# Patient Record
Sex: FEMALE | Race: BLACK OR AFRICAN AMERICAN | NOT HISPANIC OR LATINO | Employment: OTHER | URBAN - METROPOLITAN AREA
[De-identification: names, ages, dates, MRNs, and addresses within clinical notes are randomized per-mention and may not be internally consistent; named-entity substitution may affect disease eponyms.]

---

## 2017-01-03 ENCOUNTER — TELEPHONE (OUTPATIENT)
Dept: ORTHOPEDICS | Facility: CLINIC | Age: 52
End: 2017-01-03

## 2017-01-03 NOTE — TELEPHONE ENCOUNTER
"----- Message from Sylvie Monterroso sent at 1/3/2017  9:17 AM CST -----  Contact: Patient herself  X  1st Request  _  2nd Request  _  3rd Request    Who:  Amna Chawla (mrn# [id number])    Why:  Patient called and said, "surgery area has opened more since last office visit."  Please give a call at your earliest convenience.  THANKS!    What Number to Call Back:  [phone]    When to Expect a call back: (Before the end of the day)   -- if call after 3:00 call back will be tomorrow.                          "

## 2017-01-04 ENCOUNTER — OFFICE VISIT (OUTPATIENT)
Dept: NEUROLOGY | Facility: CLINIC | Age: 52
End: 2017-01-04
Attending: PSYCHIATRY & NEUROLOGY
Payer: MEDICARE

## 2017-01-04 ENCOUNTER — OFFICE VISIT (OUTPATIENT)
Dept: ORTHOPEDICS | Facility: CLINIC | Age: 52
End: 2017-01-04
Payer: MEDICARE

## 2017-01-04 VITALS
HEART RATE: 88 BPM | WEIGHT: 266.81 LBS | DIASTOLIC BLOOD PRESSURE: 100 MMHG | HEIGHT: 64 IN | SYSTOLIC BLOOD PRESSURE: 136 MMHG | BODY MASS INDEX: 45.55 KG/M2

## 2017-01-04 VITALS
HEART RATE: 73 BPM | WEIGHT: 266 LBS | DIASTOLIC BLOOD PRESSURE: 83 MMHG | BODY MASS INDEX: 45.41 KG/M2 | SYSTOLIC BLOOD PRESSURE: 121 MMHG | HEIGHT: 64 IN

## 2017-01-04 DIAGNOSIS — Z98.890 STATUS POST SURGERY: Primary | ICD-10-CM

## 2017-01-04 DIAGNOSIS — E66.01 OBESITY, CLASS III, BMI 40-49.9 (MORBID OBESITY): ICD-10-CM

## 2017-01-04 DIAGNOSIS — E78.5 HYPERLIPIDEMIA, UNSPECIFIED HYPERLIPIDEMIA TYPE: ICD-10-CM

## 2017-01-04 DIAGNOSIS — E78.00 PURE HYPERCHOLESTEROLEMIA: ICD-10-CM

## 2017-01-04 DIAGNOSIS — F41.9 ANXIETY: ICD-10-CM

## 2017-01-04 DIAGNOSIS — G50.0 SUPRAORBITAL NEURALGIA: ICD-10-CM

## 2017-01-04 DIAGNOSIS — G44.86 CERVICOGENIC HEADACHE: ICD-10-CM

## 2017-01-04 DIAGNOSIS — R41.89 COGNITIVE DEFICITS: ICD-10-CM

## 2017-01-04 DIAGNOSIS — M54.81 BILATERAL OCCIPITAL NEURALGIA: ICD-10-CM

## 2017-01-04 DIAGNOSIS — G93.1 ANOXIC BRAIN INJURY: ICD-10-CM

## 2017-01-04 DIAGNOSIS — G43.711 CHRONIC MIGRAINE WITHOUT AURA, WITH INTRACTABLE MIGRAINE, SO STATED, WITH STATUS MIGRAINOSUS: Primary | ICD-10-CM

## 2017-01-04 PROCEDURE — 99999 PR PBB SHADOW E&M-EST. PATIENT-LVL IV: CPT | Mod: PBBFAC,,, | Performed by: PSYCHIATRY & NEUROLOGY

## 2017-01-04 PROCEDURE — 96118 PR NEUROPSYCH TESTING BY PSYCH/PHYS: CPT | Mod: 59,S$PBB,, | Performed by: PSYCHIATRY & NEUROLOGY

## 2017-01-04 PROCEDURE — 99213 OFFICE O/P EST LOW 20 MIN: CPT | Mod: PBBFAC,27 | Performed by: PLASTIC SURGERY

## 2017-01-04 PROCEDURE — 99215 OFFICE O/P EST HI 40 MIN: CPT | Mod: 25,S$PBB,, | Performed by: PSYCHIATRY & NEUROLOGY

## 2017-01-04 PROCEDURE — 99999 PR PBB SHADOW E&M-EST. PATIENT-LVL III: CPT | Mod: PBBFAC,,, | Performed by: PLASTIC SURGERY

## 2017-01-04 PROCEDURE — 99024 POSTOP FOLLOW-UP VISIT: CPT | Mod: ,,, | Performed by: PLASTIC SURGERY

## 2017-01-04 NOTE — LETTER
January 4, 2017      David Cortez MD  [address]  [facility] [address]           [facility]  [address]  [facility] [address]  Phone: [phone]  Fax: [phone]          Patient: Amna Chawla   MR Number: [id number]   YOB: 1965   Date of Visit: 1/4/2017       Dear Dr. David Cortez:    Thank you for referring Amna Chawla to me for evaluation. Attached you will find relevant portions of my assessment and plan of care.    If you have questions, please do not hesitate to call me. I look forward to following Amna Chawla along with you.    Sincerely,    Missy Rock MD    Enclosure  CC:  No Recipients    If you would like to receive this communication electronically, please contact [email or web address] or [phone] to request more information on [employer] access.    For providers and/or their staff who would like to refer a patient to [facility], please contact us through our one-stop-shop provider referral line, [facility], at [phone].    If you feel you have received this communication in error or would no longer like to receive these types of communications, please e-mail [email or web address]

## 2017-01-04 NOTE — PATIENT INSTRUCTIONS
Thank you for visiting us at [facility].  You were seen by Dr. Missy Rock.  You were seen for brain injury.  We will be recommending the following:    -conservative measures: cognitive rest, avoid further head injuries  -sleep/wake cycle:   -sleep hygiene (see below)   -melatonin 0.1-0.3mg at night as needed  We have recommended speech(memory), occupational (activities of daily life), and physical(neck) therapy for you.  Please contact [phone] to schedule.  You will be seen by Dr. Rivas or King for your neuropsychological testing.  Please contact his assistant Aurelia Morales at [phone] to schedule.  -headaches: per Dr. Cortez  -Check out the BIALA website: http://www.biala.org/ and ABIS support group www.abisnola.org      You may receive a survey about your experience at [facility].  We appreciate you taking the time to complete the survey to help us improve our service and care.    Sleep Hygiene:    1. Avoid watching TV, eating, and discussing emotional issues in bed. The bed should be used for sleep and sex only. If not, we can associate the bed with other activities and it often becomes difficult to fall asleep.  2. Minimize noise, light, and temperature extremes during sleep with ear plugs, window blinds, or an electric blanket or air conditioner. Even the slightest nighttime noises or luminescent lights can disrupt the quality of your sleep. Try to keep your bedroom at a comfortable temperature -- not too hot (above 75 degrees) or too cold (below 54 degrees).  3. Try not to drink fluids after 8 p.m. This may reduce awakenings due to urination.  4. Avoid naps, but if you do nap, make it no more than about 25 minutes about eight hours after you awake. But if you have problems falling asleep, then no naps for you.  5. Do not expose your self to bright light if you need to get up at night. Use a small night-light instead.  6. Nicotine is a stimulant and should be avoided  particularly near bedtime and upon night awakenings. Having a smoke before bed, although it may feel relaxing, is actually putting a stimulant into your bloodstream.  7. Caffeine is also a stimulant and is present in coffee (100-200 mg), soda (50-75 mg), tea (50-75 mg), and various over-the-counter medications. Caffeine should be discontinued at least four to six hours before bedtime. If you consume large amounts of caffeine and you cut your self off too quickly, beware; you may get headaches that could keep you awake.  8. Although alcohol is a depressant and may help you fall asleep, the subsequent metabolism that clears it from your body when you are sleeping causes a withdrawal syndrome. This withdrawal causes awakenings and is often associated with nightmares and sweats.  9. A light snack may be sleep-inducing, but a heavy meal too close to bedtime interferes with sleep. Stay away from protein and stick to carbohydrates or dairy products. Milk contains the amino acid L-tryptophan, which has been shown in research to help people go to sleep. So milk and cookies or crackers (without chocolate) may be useful and taste good as well.    -Brain Health lifestyle modifications:    -sleep hygiene   - diet: Mediterranean Diet    -exercise: 20-30 minutes of  Moderate exercise 3-5 times a week   -stress management reviewed   -smoking cessation, alcohol moderation    Check out my blog post for further information:  [email or web address]-[email or web address]

## 2017-01-04 NOTE — PROGRESS NOTES
"Ms. Chawla is here today for a post-operative visit.she is 4 weeks status post right open carpal tunnel release on 12/9 . she reports that she is doing well, she states that still has some soreness in the palm and wrist. She denies any redness or drainage.  she denies fever, chills, and sweats since the time of the surgery.     Physical exam:    Vitals:    01/04/17 1021   BP: 121/83   Pulse: 73   Weight: 120.7 kg (266 lb)   Height: 5' 4" (1.626 m)   PainSc:   8   PainLoc: Hand      Incision is clean, dry and intact. Mild peeling skin around wound,  There is no erythema or exudate.  There is no sign of any infection. she is NVI. Sutures in place, good opposition    Assessment: status post right open carpal tunnel release    Plan:  Amna was seen today for pain and post-op evaluation.    Diagnoses and all orders for this visit:    Status post surgery    - sutures removed today,   -  may get it wet in the shower and use regular soap  - avoid heavy lifting  - Continue Range of motion exercises   - Tylenol 500 mg and/or Ibuprofen 400mg every 4-6 hours with food for pain as tolerated  - - Follow up: three weeks  "

## 2017-01-04 NOTE — MR AVS SNAPSHOT
[facility]  [address]  [place] [address]  Phone: [phone]  Fax: [phone]                  Amna Chawla   2017 9:00 AM   Office Visit    Description:  Female : 1965   Provider:  Missy Rock MD   Department:  [facility]           Reason for Visit     Concussion           Diagnoses this Visit        Comments    Chronic migraine without aura, with intractable migraine, so stated, with status migrainosus    -  Primary     Supraorbital neuralgia         Bilateral occipital neuralgia         Anoxic brain injury         Hyperlipidemia, unspecified hyperlipidemia type         Cognitive deficits         Pure hypercholesterolemia         Anxiety         Obesity, Class III, BMI 40-49.9 (morbid obesity)         Cervicogenic headache                To Do List           Future Appointments        Provider Department Dept Phone    2017 10:30 AM Matt Pugh Jr., MD [facility] [phone]    2017 10:15 AM Matt Pguh Jr., MD [facility] [phone]    2017 8:00 AM Missy Rock MD [facility] [phone]    3/28/2017 9:40 AM PACEMAKER, [facility] [address] - Arrhythmia [phone]      Goals (5 Years of Data)     None      Follow-Up and Disposition     Return in about 2 months (around 3/4/2017).    Follow-up and Disposition History      [facility] On [facility]     [facility] On Call Nurse Care Line -  Assistance  Registered nurses in the [facility] On Call Center provide clinical advisement, health education, appointment booking, and other advisory services.  Call for this free service at [phone].             Medications           Message regarding Medications     Verify the changes and/or additions to your medication regime listed below are the same as discussed with your clinician today.  If any of these changes or additions are incorrect, please notify your healthcare provider.             Verify that the below list of  medications is an accurate representation of the medications you are currently taking.  If none reported, the list may be blank. If incorrect, please contact your healthcare provider. Carry this list with you in case of emergency.           Current Medications     aripiprazole (ABILIFY) 5 MG Tab TAKE 1 TABLET(5 MG) BY MOUTH EVERY DAY    atorvastatin (LIPITOR) 40 MG tablet TAKE ONE TABLET BY MOUTH EVERY MORNING    butalbital-acetaminophen-caffeine -40 mg (FIORICET, ESGIC) -40 mg per tablet     carvedilol (COREG) 25 MG tablet TAKE 1 TABLET(25 MG) BY MOUTH TWICE DAILY WITH MEALS    chlorthalidone (HYGROTEN) 25 MG Tab TAKE 1 TABLET BY MOUTH EVERY DAY    diclofenac sodium (VOLTAREN) 1 % Gel Apply twice daily using enclosed measuring device.    divalproex (DEPAKOTE) 250 MG EC tablet Take 250 mg by mouth.     donepezil (ARICEPT) 10 MG tablet Take 1 tablet (10 mg total) by mouth 2 (two) times daily.    duloxetine (CYMBALTA) 60 MG capsule Take 1 capsule (60 mg total) by mouth 2 (two) times daily.    gabapentin (NEURONTIN) 300 MG capsule Take 3 capsules (900 mg total) by mouth 3 (three) times daily.    hydrocodone-acetaminophen 5-325mg (NORCO) 5-325 mg per tablet Take 1 tablet by mouth every 6 (six) hours as needed for Pain.    hydrocodone-acetaminophen 5-325mg (NORCO) 5-325 mg per tablet Take 1 tablet by mouth every 6 (six) hours as needed for Pain.    lamotrigine (LAMICTAL) 25 MG tablet Take 6 tablets (150 mg total) by mouth once daily.    magnesium 200 mg Tab Take 200 mg by mouth once daily.    mefenamic acid 250 mg Cap TAKE 2 CAPSULES BY MOUTH TWICE DAILY AS NEEDED.    oxycodone-acetaminophen (PERCOCET) 5-325 mg per tablet Take 1 tablet by mouth every 8 (eight) hours as needed for Pain.    pantoprazole (PROTONIX) 40 MG tablet Take 1 tablet (40 mg total) by mouth once daily.    temazepam (RESTORIL) 15 mg Cap Take 1 capsule (15 mg total) by mouth nightly as needed.    tiagabine (GABITRIL) 4 MG tablet Take 1  "tablet (4 mg total) by mouth 2 (two) times daily as needed (headache).    tizanidine (ZANAFLEX) 4 MG tablet Take 4 mg by mouth every 6 (six) hours as needed.    VITAMIN D2 50,000 unit capsule TAKE 1 CAPSULE BY MOUTH EVERY 7 DAYS    desipramine (NORPRAMIN) 25 MG tablet Take 1 tablet (25 mg total) by mouth once daily.    lorazepam (ATIVAN) 0.5 MG tablet Take 1 tablet (0.5 mg total) by mouth every 6 (six) hours as needed for Anxiety.    topiramate (TOPAMAX) 100 MG tablet Take 1.5 tablets (150 mg total) by mouth 2 (two) times daily.           Clinical Reference Information           Vital Signs - Last Recorded  Most recent update: 1/4/2017  9:06 AM by Laure Ruth    BP Pulse Ht Wt BMI    (!) 136/100 88 5' 4" (1.626 m) 121 kg (266 lb 12.8 oz) 45.8 kg/m2      Blood Pressure          Most Recent Value    BP  (!)  136/100      Allergies as of 1/4/2017     Aspirin    Imitrex [Sumatriptan]    Nsaids (Non-steroidal Anti-inflammatory Drug)    Penicillins    Shellfish Containing Products    Percocet [Oxycodone-acetaminophen]    Reglan [Metoclopramide Hcl]      Immunizations Administered on Date of Encounter - 1/4/2017     None      Orders Placed During Today's Visit      Normal Orders This Visit    Ambulatory consult to Occupational Therapy     Ambulatory consult to Physical Therapy     Ambulatory consult to Speech Therapy     Ambulatory Referral to Neuropsychology       Instructions    Thank you for visiting us at [facility].  You were seen by Dr. Missy Rock.  You were seen for brain injury.  We will be recommending the following:    -conservative measures: cognitive rest, avoid further head injuries  -sleep/wake cycle:   -sleep hygiene (see below)   -melatonin 0.1-0.3mg at night as needed  We have recommended speech(memory), occupational (activities of daily life), and physical(neck) therapy for you.  Please contact [phone] to schedule.  You will be seen by Dr. Rivas or King for your " neuropsychological testing.  Please contact his assistant Aurelia Morales at [phone] to schedule.  -headaches: per Dr. Cortez  -Check out the [employer]Social[employer] website: http://www.biala.org/ and ABIS support group www.abisnola.org      You may receive a survey about your experience at [facility].  We appreciate you taking the time to complete the survey to help us improve our service and care.    Sleep Hygiene:    1. Avoid watching TV, eating, and discussing emotional issues in bed. The bed should be used for sleep and sex only. If not, we can associate the bed with other activities and it often becomes difficult to fall asleep.  2. Minimize noise, light, and temperature extremes during sleep with ear plugs, window blinds, or an electric blanket or air conditioner. Even the slightest nighttime noises or luminescent lights can disrupt the quality of your sleep. Try to keep your bedroom at a comfortable temperature -- not too hot (above 75 degrees) or too cold (below 54 degrees).  3. Try not to drink fluids after 8 p.m. This may reduce awakenings due to urination.  4. Avoid naps, but if you do nap, make it no more than about 25 minutes about eight hours after you awake. But if you have problems falling asleep, then no naps for you.  5. Do not expose your self to bright light if you need to get up at night. Use a small night-light instead.  6. Nicotine is a stimulant and should be avoided particularly near bedtime and upon night awakenings. Having a smoke before bed, although it may feel relaxing, is actually putting a stimulant into your bloodstream.  7. Caffeine is also a stimulant and is present in coffee (100-200 mg), soda (50-75 mg), tea (50-75 mg), and various over-the-counter medications. Caffeine should be discontinued at least four to six hours before bedtime. If you consume large amounts of caffeine and you cut your self off too quickly, beware; you may get headaches that could keep you awake.  8.  Although alcohol is a depressant and may help you fall asleep, the subsequent metabolism that clears it from your body when you are sleeping causes a withdrawal syndrome. This withdrawal causes awakenings and is often associated with nightmares and sweats.  9. A light snack may be sleep-inducing, but a heavy meal too close to bedtime interferes with sleep. Stay away from protein and stick to carbohydrates or dairy products. Milk contains the amino acid L-tryptophan, which has been shown in research to help people go to sleep. So milk and cookies or crackers (without chocolate) may be useful and taste good as well.    -Brain Health lifestyle modifications:    -sleep hygiene   - diet: Mediterranean Diet    -exercise: 20-30 minutes of  Moderate exercise 3-5 times a week   -stress management reviewed   -smoking cessation, alcohol moderation    Check out my blog post for further information:  [email or web address]-[email or web address]-questions-[email or web address]-health

## 2017-01-10 ENCOUNTER — TELEPHONE (OUTPATIENT)
Dept: NEUROLOGY | Facility: CLINIC | Age: 52
End: 2017-01-10

## 2017-01-10 DIAGNOSIS — G43.711 CHRONIC MIGRAINE WITHOUT AURA, INTRACTABLE, WITH STATUS MIGRAINOSUS: Primary | ICD-10-CM

## 2017-01-10 NOTE — TELEPHONE ENCOUNTER
Spoke to Pt she verbalized her appt date and time appt letter mailed         - Message from Laina Nair RN sent at 1/9/2017  2:32 PM CST -----  Contact: Self [phone]      ----- Message -----     From: Joshua Morrison     Sent: 1/9/2017   2:23 PM       To: Dana Hernandez Staff    Patient is calling to schedule an appt with , pls call

## 2017-01-16 ENCOUNTER — CLINICAL SUPPORT (OUTPATIENT)
Dept: REHABILITATION | Facility: HOSPITAL | Age: 52
End: 2017-01-16
Attending: PSYCHIATRY & NEUROLOGY
Payer: MEDICARE

## 2017-01-16 DIAGNOSIS — Z78.9 IMPAIRED MOBILITY AND ADLS: ICD-10-CM

## 2017-01-16 DIAGNOSIS — Z74.09 IMPAIRED MOBILITY AND ADLS: ICD-10-CM

## 2017-01-16 DIAGNOSIS — F06.8 COGNITIVE DEFICIT AS LATE EFFECT OF TRAUMATIC BRAIN INJURY: ICD-10-CM

## 2017-01-16 DIAGNOSIS — S06.9X0S COGNITIVE DEFICIT AS LATE EFFECT OF TRAUMATIC BRAIN INJURY: ICD-10-CM

## 2017-01-16 PROBLEM — R41.89 COGNITIVE DEFICIT AS LATE EFFECT OF TRAUMATIC BRAIN INJURY: Status: ACTIVE | Noted: 2017-01-16

## 2017-01-16 PROBLEM — S06.9XAS COGNITIVE DEFICIT AS LATE EFFECT OF TRAUMATIC BRAIN INJURY: Status: ACTIVE | Noted: 2017-01-16

## 2017-01-16 PROCEDURE — G8989 SELF CARE D/C STATUS: HCPCS | Mod: CI,PO

## 2017-01-16 PROCEDURE — G8987 SELF CARE CURRENT STATUS: HCPCS | Mod: CI,PO

## 2017-01-16 PROCEDURE — 97165 OT EVAL LOW COMPLEX 30 MIN: CPT | Mod: PO

## 2017-01-16 PROCEDURE — G8988 SELF CARE GOAL STATUS: HCPCS | Mod: CI,PO

## 2017-01-16 NOTE — PROGRESS NOTES
Name: Amna Chawla  MRN: [id number]   Physician: Missy Rock MD     Diagnosis: Impaired ADL and IADL due to memory deficits from hypoxic episode 4 years ago.   Medical history: Expanded review of medical records and personal interview.   Onset date: 2/7/2013 hypoxic episode, Carpal tunnel sx 12/9/16, Fx  R ankle last year as well from fall in home    Date of service: 1/16/17  Start time: 945  End time: 1030    Orders: Eval and Treat, S/p HIE with issues in iADLs and house hold working    Subjective:  Patient goals: Improve memory  Chief Complaint:   Chief Complaint   Patient presents with    OT Initial Evaluation      Past Medical History   Diagnosis Date    Asthma     Brain anoxic injury     Coronary artery disease     Defibrillator activation 2013    Depression     History of sudden cardiac arrest 2/2013     PEA arrest with subsequent long-QT    Hypertension     Pacemaker 2013    Seizures     Stroke      weakness lt side      Current Outpatient Prescriptions   Medication Sig    aripiprazole (ABILIFY) 5 MG Tab TAKE 1 TABLET(5 MG) BY MOUTH EVERY DAY    atorvastatin (LIPITOR) 40 MG tablet TAKE ONE TABLET BY MOUTH EVERY MORNING    butalbital-acetaminophen-caffeine -40 mg (FIORICET, ESGIC) -40 mg per tablet     carvedilol (COREG) 25 MG tablet TAKE 1 TABLET(25 MG) BY MOUTH TWICE DAILY WITH MEALS    chlorthalidone (HYGROTEN) 25 MG Tab TAKE 1 TABLET BY MOUTH EVERY DAY    desipramine (NORPRAMIN) 25 MG tablet Take 1 tablet (25 mg total) by mouth once daily.    diclofenac sodium (VOLTAREN) 1 % Gel Apply twice daily using enclosed measuring device.    divalproex (DEPAKOTE) 250 MG EC tablet Take 250 mg by mouth.     donepezil (ARICEPT) 10 MG tablet Take 1 tablet (10 mg total) by mouth 2 (two) times daily.    duloxetine (CYMBALTA) 60 MG capsule Take 1 capsule (60 mg total) by mouth 2 (two) times daily.    gabapentin (NEURONTIN) 300 MG capsule Take 3 capsules (900 mg total) by mouth 3 (three)  times daily.    hydrocodone-acetaminophen 5-325mg (NORCO) 5-325 mg per tablet Take 1 tablet by mouth every 6 (six) hours as needed for Pain.    hydrocodone-acetaminophen 5-325mg (NORCO) 5-325 mg per tablet Take 1 tablet by mouth every 6 (six) hours as needed for Pain.    lamotrigine (LAMICTAL) 25 MG tablet Take 6 tablets (150 mg total) by mouth once daily.    lorazepam (ATIVAN) 0.5 MG tablet Take 1 tablet (0.5 mg total) by mouth every 6 (six) hours as needed for Anxiety.    magnesium 200 mg Tab Take 200 mg by mouth once daily.    mefenamic acid 250 mg Cap TAKE 2 CAPSULES BY MOUTH TWICE DAILY AS NEEDED.    oxycodone-acetaminophen (PERCOCET) 5-325 mg per tablet Take 1 tablet by mouth every 8 (eight) hours as needed for Pain.    pantoprazole (PROTONIX) 40 MG tablet Take 1 tablet (40 mg total) by mouth once daily.    temazepam (RESTORIL) 15 mg Cap Take 1 capsule (15 mg total) by mouth nightly as needed.    tiagabine (GABITRIL) 4 MG tablet Take 1 tablet (4 mg total) by mouth 2 (two) times daily as needed (headache).    tizanidine (ZANAFLEX) 4 MG tablet Take 4 mg by mouth every 6 (six) hours as needed.    topiramate (TOPAMAX) 100 MG tablet Take 1.5 tablets (150 mg total) by mouth 2 (two) times daily.    VITAMIN D2 50,000 unit capsule TAKE 1 CAPSULE BY MOUTH EVERY 7 DAYS     Current Facility-Administered Medications   Medication    onabotulinumtoxina injection 200 Units    onabotulinumtoxina injection 200 Units    onabotulinumtoxina injection 200 Units    onabotulinumtoxina injection 200 Units    onabotulinumtoxina injection 200 Units     Precautions: decreased memory for cooking with danger of burning things, falls,  Social Hx: disabled  for a middle/high school , lives with daughters and fiance, 9 year old and 29 year old     DME: tub bench, cane, rollator for community,     Home access: no steps    Review of patient's allergies indicates:   Allergen Reactions    Aspirin Hives     Imitrex [sumatriptan] Palpitations    Nsaids (non-steroidal anti-inflammatory drug) Shortness Of Breath    Penicillins Hives and Swelling    Shellfish containing products Anaphylaxis     seafood    Percocet [oxycodone-acetaminophen] Itching     States can take    Reglan [metoclopramide hcl] Other (See Comments)     Parkinsonism        Special Tests:  MRI: see EPIC      Past treatment includes: OT  acute care, [facility], OP at [facility],     Precautions:  Pain: 5/10 at rest. 8/10 with movement. Pain established using the Numbers pain scale.   Pain location:L side of neck  Pain description: spasming,  Relieved by: massage and pain medication    Dominant hand: right    Occupation/hobbies/homemaking: likes to make wreaths and decorate house, cooks with supervision, helps daughter with school work some. Pt. Is never alone.    Driving status: no longer drives    Objective  Cognitive Exam  Oriented: Person, Place, Time and Situation  Behaviors: informative  Follows Commands/attention: Follows one-step commands  Communication: clear/fluent  Memory: Impaired STM  Safety awareness/insight to disability: good  Coping skills/emotional control: depressed    Visual/perceptual: Pt. Complains of blurred vision but states she has been seen by ophthalmologist and there is nothing that can be done.   Motor Planning Praxis: intact    Physical Exam:    Postural examination/scapula alignment: Rounded shoulders  Joint integrity: WNL  Skin integrity: WNL  Edema: none    Palpation: Pt. Has palpable tenderness in cervical area.     Sensation: Amna reports numbness in L hand due to carpal tunnel. R hand has had carpal tunnel sx.   ROM and strength are functional in BUE.      Strength: 30,42, 40= 37lbs RUE,   20,30,35= 28lbs LUE    Fine motor coordination: 9 hole peg test 19 secs each UE.     Gross motor coordination: WNL  Pt. Reports she gets tremors when over stimulated.   Tone:  Modified Yodit Scale:   0 - No increase in  muscle tone    Balance:   Static Sit WFL  Dynamic sit WFL    Functional Status:                              Functional Mobility:  Bed mobility: Mod I  Roll to left: Mod I  Roll to right: Mod I  Supine to sit: Mod I  Sit to supine: Mod I  Transfers to bed: Mod I  Transfers to toilet: Mod I  Car transfers: Mod I  Wheelchair mobility: n/a    ADL's:  Feeding: Mod I  Grooming: Mod I  Hygiene: Mod I  UB Dressing: Mod I  LB Dressing: Mod I  Toileting: Mod I  Bathing: Mod I    IADL's:  Homecare: Min A  Cooking: Min A  Laundry: Min A  Yard work: n/a  Use of telephone: Mod I  Money management: Mod A  Medication management: Min A  Comments: Pt. Needs to make sure someone is aware of what she is doing because she forgets things cooking or leaves water running. Pt. Folds laundry/sorts clothes and starts washer. She washes dishes, sweeps and dusts. She cleans the bathroom per her report. Family manages her money. She takes her meds with reminders occasionally. Pt. Makes coffee. Pt. Seems to do well with routine but makes errors if routine is disrupted.     TODAY'S TREATMENT  Pt. Does not have any new problems. She has had extensive OT in the past. She is functional in her home with supervision.     ASSESSMENT:  OT diagnosis: impaired IADL due to hypoxic episode 3 years ago.     Assessment  Amna Chawla is a 51 y.o. female with a medical diagnosis of hypoxia referred to occupational therapy for tx of IADL deficits. She presents with history of treatment for this in the past.    Pt. Will see speech therapy to review memory strategies with patient and family. No OT required at this time.     PLAN:  Patient/caregiver understands and agrees with plan of care.    An expanded chart review with minimal comorbidities and limited deficits with treatment options minimal due to already receiving in the past for this condition.

## 2017-01-16 NOTE — PLAN OF CARE
Name: Amna Chawla  MRN: [id number]   Physician: Missy Rock MD     Diagnosis: Impaired ADL and IADL due to memory deficits from hypoxic episode 4 years ago.   Medical history: Expanded review of medical records and personal interview.   Onset date: 2/7/2013 hypoxic episode, Carpal tunnel sx 12/9/16, Fx  R ankle last year as well from fall in home    Date of service: 1/16/17  Start time: 945  End time: 1030    Orders: Eval and Treat, S/p HIE with issues in iADLs and house hold working    Subjective:  Patient goals: Improve memory  Chief Complaint:   Chief Complaint   Patient presents with    OT Initial Evaluation      Past Medical History   Diagnosis Date    Asthma     Brain anoxic injury     Coronary artery disease     Defibrillator activation 2013    Depression     History of sudden cardiac arrest 2/2013     PEA arrest with subsequent long-QT    Hypertension     Pacemaker 2013    Seizures     Stroke      weakness lt side      Current Outpatient Prescriptions   Medication Sig    aripiprazole (ABILIFY) 5 MG Tab TAKE 1 TABLET(5 MG) BY MOUTH EVERY DAY    atorvastatin (LIPITOR) 40 MG tablet TAKE ONE TABLET BY MOUTH EVERY MORNING    butalbital-acetaminophen-caffeine -40 mg (FIORICET, ESGIC) -40 mg per tablet     carvedilol (COREG) 25 MG tablet TAKE 1 TABLET(25 MG) BY MOUTH TWICE DAILY WITH MEALS    chlorthalidone (HYGROTEN) 25 MG Tab TAKE 1 TABLET BY MOUTH EVERY DAY    desipramine (NORPRAMIN) 25 MG tablet Take 1 tablet (25 mg total) by mouth once daily.    diclofenac sodium (VOLTAREN) 1 % Gel Apply twice daily using enclosed measuring device.    divalproex (DEPAKOTE) 250 MG EC tablet Take 250 mg by mouth.     donepezil (ARICEPT) 10 MG tablet Take 1 tablet (10 mg total) by mouth 2 (two) times daily.    duloxetine (CYMBALTA) 60 MG capsule Take 1 capsule (60 mg total) by mouth 2 (two) times daily.    gabapentin (NEURONTIN) 300 MG capsule Take 3 capsules (900 mg total) by mouth 3 (three)  times daily.    hydrocodone-acetaminophen 5-325mg (NORCO) 5-325 mg per tablet Take 1 tablet by mouth every 6 (six) hours as needed for Pain.    hydrocodone-acetaminophen 5-325mg (NORCO) 5-325 mg per tablet Take 1 tablet by mouth every 6 (six) hours as needed for Pain.    lamotrigine (LAMICTAL) 25 MG tablet Take 6 tablets (150 mg total) by mouth once daily.    lorazepam (ATIVAN) 0.5 MG tablet Take 1 tablet (0.5 mg total) by mouth every 6 (six) hours as needed for Anxiety.    magnesium 200 mg Tab Take 200 mg by mouth once daily.    mefenamic acid 250 mg Cap TAKE 2 CAPSULES BY MOUTH TWICE DAILY AS NEEDED.    oxycodone-acetaminophen (PERCOCET) 5-325 mg per tablet Take 1 tablet by mouth every 8 (eight) hours as needed for Pain.    pantoprazole (PROTONIX) 40 MG tablet Take 1 tablet (40 mg total) by mouth once daily.    temazepam (RESTORIL) 15 mg Cap Take 1 capsule (15 mg total) by mouth nightly as needed.    tiagabine (GABITRIL) 4 MG tablet Take 1 tablet (4 mg total) by mouth 2 (two) times daily as needed (headache).    tizanidine (ZANAFLEX) 4 MG tablet Take 4 mg by mouth every 6 (six) hours as needed.    topiramate (TOPAMAX) 100 MG tablet Take 1.5 tablets (150 mg total) by mouth 2 (two) times daily.    VITAMIN D2 50,000 unit capsule TAKE 1 CAPSULE BY MOUTH EVERY 7 DAYS     Current Facility-Administered Medications   Medication    onabotulinumtoxina injection 200 Units    onabotulinumtoxina injection 200 Units    onabotulinumtoxina injection 200 Units    onabotulinumtoxina injection 200 Units    onabotulinumtoxina injection 200 Units     Precautions: decreased memory for cooking with danger of burning things, falls,  Social Hx: disabled  for a middle/high school , lives with daughters and fiance, 9 year old and 29 year old     DME: tub bench, cane, rollator for community,     Home access: no steps    Review of patient's allergies indicates:   Allergen Reactions    Aspirin Hives     Imitrex [sumatriptan] Palpitations    Nsaids (non-steroidal anti-inflammatory drug) Shortness Of Breath    Penicillins Hives and Swelling    Shellfish containing products Anaphylaxis     seafood    Percocet [oxycodone-acetaminophen] Itching     States can take    Reglan [metoclopramide hcl] Other (See Comments)     Parkinsonism        Special Tests:  MRI: see EPIC      Past treatment includes: OT  acute care, [facility], OP at [facility],     Precautions:  Pain: 5/10 at rest. 8/10 with movement. Pain established using the Numbers pain scale.   Pain location:L side of neck  Pain description: spasming,  Relieved by: massage and pain medication    Dominant hand: right    Occupation/hobbies/homemaking: likes to make wreaths and decorate house, cooks with supervision, helps daughter with school work some. Pt. Is never alone.    Driving status: no longer drives    Objective  Cognitive Exam  Oriented: Person, Place, Time and Situation  Behaviors: informative  Follows Commands/attention: Follows one-step commands  Communication: clear/fluent  Memory: Impaired STM  Safety awareness/insight to disability: good  Coping skills/emotional control: depressed    Visual/perceptual: Pt. Complains of blurred vision but states she has been seen by ophthalmologist and there is nothing that can be done.   Motor Planning Praxis: intact    Physical Exam:    Postural examination/scapula alignment: Rounded shoulders  Joint integrity: WNL  Skin integrity: WNL  Edema: none    Palpation: Pt. Has palpable tenderness in cervical area.     Sensation: Amna reports numbness in L hand due to carpal tunnel. R hand has had carpal tunnel sx.   ROM and strength are functional in BUE.      Strength: 30,42, 40= 37lbs RUE,   20,30,35= 28lbs LUE    Fine motor coordination: 9 hole peg test 19 secs each UE.     Gross motor coordination: WNL  Pt. Reports she gets tremors when over stimulated.   Tone:  Modified Yodit Scale:   0 - No increase in  muscle tone    Balance:   Static Sit WFL  Dynamic sit WFL    Functional Status:                              Functional Mobility:  Bed mobility: Mod I  Roll to left: Mod I  Roll to right: Mod I  Supine to sit: Mod I  Sit to supine: Mod I  Transfers to bed: Mod I  Transfers to toilet: Mod I  Car transfers: Mod I  Wheelchair mobility: n/a    ADL's:  Feeding: Mod I  Grooming: Mod I  Hygiene: Mod I  UB Dressing: Mod I  LB Dressing: Mod I  Toileting: Mod I  Bathing: Mod I    IADL's:  Homecare: Min A  Cooking: Min A  Laundry: Min A  Yard work: n/a  Use of telephone: Mod I  Money management: Mod A  Medication management: Min A  Comments: Pt. Needs to make sure someone is aware of what she is doing because she forgets things cooking or leaves water running. Pt. Folds laundry/sorts clothes and starts washer. She washes dishes, sweeps and dusts. She cleans the bathroom per her report. Family manages her money. She takes her meds with reminders occasionally. Pt. Makes coffee. Pt. Seems to do well with routine but makes errors if routine is disrupted.     TODAY'S TREATMENT  Pt. Does not have any new problems. She has had extensive OT in the past. She is functional in her home with supervision.     ASSESSMENT:  OT diagnosis: impaired IADL due to hypoxic episode 3 years ago.     Assessment  Amna Chawla is a 51 y.o. female with a medical diagnosis of hypoxia referred to occupational therapy for tx of IADL deficits. She presents with history of treatment for this in the past.    Pt. Will see speech therapy to review memory strategies with patient and family. No OT required at this time.     PLAN:  Patient/caregiver understands and agrees with plan of care.    An expanded chart review with minimal comorbidities and limited deficits with treatment options minimal due to already receiving in the past for this condition.

## 2017-01-16 NOTE — PROGRESS NOTES
"Date: 01/16/2017    Start Time:  1030  Stop Time:  1115      OUTPATIENT NEUROLOGICAL REHABILITATION  SPEECH THERAPY EVALUATION    Subjective/History  Onset Date:  February 7, 2013  Primary Diagnosis:  Anoxic Brain Injury  Treatment Diagnosis:  Cognitive-Linguistic Impairment  Referring Provider:  Dr. Rock  Orders:  for evaluation and treat  Current Medical History:  Mrs. Chawla presents to the [facility] secondary to the diagnosis of anoxic brain injury. She suffered a cardiac arrest, CVA, and fell and hit her head and suffered a concussion in 2013. She had some complications while hospitalized during that time. She now has a pacemaker and defibrillator and suffers with depression and headaches.  No family was present during this evaluation to provide history. She took the RTA LIFT today.  Past Medical History:   Past Medical History   Diagnosis Date    Asthma     Brain anoxic injury     Coronary artery disease     Defibrillator activation 2013    Depression     History of sudden cardiac arrest 2/2013     PEA arrest with subsequent long-QT    Hypertension     Pacemaker 2013    Seizures     Stroke      weakness lt side   SUMMARY AND RECOMMENDATIONS from neuropsych testing:   "Ms. Chawla was referred for Neuropsychological Evaluation on an outpatient basis due to subjective residual memory impairment following acute cardiac arrest complicated by a stroke and closed head injury (hypoxic brain injury), all of which occurred on 2/7/2013. Her general cognitive abilities as assessed by the RBANS were in the severely impaired range, with moderately impaired language (due to reduced verbal fluency); and severely impaired immediate verbal memory, visuospatial/constructional abilities, attention, and delayed memory. Further assessment of specific cognitive abilities revealed deficits in temporal orientation, naming, verbal fluency, and facial recognition. " "Constructional ability was unimpaired. Additional memory assessment revealed severely impaired immediate and delayed memory. Personality test data suggested the presence of severe depression and moderate anxiety. Neuropsychological testing reveals moderate to severe impairment in memory, attention, temporal orientation, visuospatial/constructional abilities, facial recognition, and verbal fluency due to hypoxic brain injury and stroke. Naming was variable with performances in the average and moderately impaired range. She will follow up with Juan Carlos Childers DNP in Psychiatry for depression and anxiety."    Precautions:  Memory issues, pacemaker, defibrillator   Prior Therapy:  Speech therapy at [facility] in 2013 for a few sessions.  Pain:  8/10 left shoulder pain  Nutrition:  Regular consistency and thin liquids  Environmental Concerns/Cultural/Spiritual/Developmental/Educational Needs: None   Prior Level of Function: Independent  Social History:  Mrs. Chawla lives at home with her  and daughter. 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 She does not drive any longer.   Signs of Abuse: No  Functional Deficits Leading to Referral/Nature of Injury:  Cognitive deficits  Patient Therapy Goals:  "Try to get this memory situation together."    Objective     Scales of Cognitive and Communicative Ability for Neurorehabilitation (SCCAN) was administered to assess cognitive and communicative functioning.        Raw Scores  Percentage Score  Oral Expression (OE)  18/19   100  Orientation (OR)   12/12   100  Memory (ME)    14/19   74  Speech Comprehension (SP)  13/13   100  Reading Comprehension (RD)  11/12   92  Writing (WR)    6/7   86  Attention (AT)    13/16   81  Problem Solving (PS)   17/23   74    Total Raw Score  %ile Rank  SCCAN Index  Degree of Severity  82   8   70   Mild      Auditory Comprehension: Within functional limits.    Reading " Comprehension: Mildly impaired. Mrs. Chawla stated that she has difficulty concentrating and loses interest in what she is reading.     Verbal Expression: Within functional limits.     Written Expression: Mildly impaired mostly with spelling errors. Writing may be at baseline and will be further assessed.     Cognition: Short term memory (immediate and delayed) were impaired. Attention and concentration were impaired for more complex tasks. Her clock drawing was inaccurate for drawn hands and time indicated. Problem solving, sequencing, organization, and reasoning were impaired for moderate to complex tasks. She listed 6 to 13 items in simple categories which was below average. Further assessment of complex cognition will be completed. Her family takes care of the finances for the home.      Motor Speech/Fluency/Voice: Informal oral motor exam revealed no gross weakness. No dysarthria was present. Voice and fluency were within functional limits.     Swallowing: No concerns were reported.    Hearing: Appeared to be within functional limits.     Assessment/Impressions  Mrs. 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 Her deficits were characterized by decreased short term memory, decreased attention/concentration, decreased moderate to complex problem-solving, reasoning, sequencing and organization. Auditory comprehension, verbal expression, voice, and fluency were within functional limits. She will benefit from outpatient neurological rehabilitation speech therapy. A repeat neuropsychological evaluation may be beneficial since the last one was in 2013.     Functional Communication Measure (FCM):   Severity Modifier for Medicare G-Code:  *Memory  Current status: FCM:  Level 5   - CJ  at least 20% < 40% impaired, limited or restricted  Projected status:  FCM:  Level 6   - CI  at least 1% but less than 20% impaired, limited or  restricted    Problem Solving  Current status: FCM:  Level 5   - CJ  at least 20% < 40% impaired, limited or restricted  Projected status:  FCM:  Level 6   - CI  at least 1% but less than 20% impaired, limited or restricted      Rehab Potential: good to fair (based on time of onset)    Short Term Goals (6 weeks):   1. Mrs. Chawla will complete short term memory tasks with 90% accuracy using compensatory strategies to increase memory.   2. Mrs. Chawla will complete moderate to complex problem solving, reasoning, mental manipulation, sequencing and organization tasks with 90% accuracy to increase cognition.   3. Mrs. Chawla will list 15 to 20 items in categories within one minute to improve word fluency and thought organization.     Long Term Goals (8 weeks):   1. Mrs. Chawla will increase her cognitive-linguistic skills using compensatory strategies to improve functional independence.   2.1. Mrs. Chawla will demonstrate understanding and use of compensatory strategies to improve functional independence.     Education:  Mrs. Chawla was educated on the plan of care and recommendations. She verbalized understanding and agreed with the plan of care.     Plan  Certification Period: 01/04/17 to 12/31/17  Plan of Care Certification Period: 01/16/17 to 03/17/17    Recommended Treatment Plan:  Mrs. Chawla will   participate in the [facility] program for speech therapy 2 times per week to address her deficits.    Other Recommendations: Neuropsychological evaluation    ELIZABETH Gramajo, CCC-SLP  Speech-Language Pathologist  Outpatient Neurological Rehabilitation    Date: 01/16/2017    I certify the need for these services furnished under this plan of treatment and while under my care.  ____________________________________ Physician/Referring Practitioner   Date of Signature

## 2017-01-18 ENCOUNTER — CLINICAL SUPPORT (OUTPATIENT)
Dept: REHABILITATION | Facility: HOSPITAL | Age: 52
End: 2017-01-18
Attending: PSYCHIATRY & NEUROLOGY
Payer: MEDICARE

## 2017-01-18 ENCOUNTER — OFFICE VISIT (OUTPATIENT)
Dept: PSYCHIATRY | Facility: CLINIC | Age: 52
End: 2017-01-18
Payer: MEDICARE

## 2017-01-18 VITALS
DIASTOLIC BLOOD PRESSURE: 92 MMHG | WEIGHT: 274 LBS | HEIGHT: 64 IN | SYSTOLIC BLOOD PRESSURE: 182 MMHG | HEART RATE: 70 BPM | BODY MASS INDEX: 46.78 KG/M2

## 2017-01-18 DIAGNOSIS — R29.898 DECREASED ROM OF NECK: ICD-10-CM

## 2017-01-18 DIAGNOSIS — Z78.9 IMPAIRED MOBILITY AND ADLS: Primary | ICD-10-CM

## 2017-01-18 DIAGNOSIS — G44.309 HEADACHES DUE TO OLD HEAD INJURY: ICD-10-CM

## 2017-01-18 DIAGNOSIS — F41.9 ANXIETY: ICD-10-CM

## 2017-01-18 DIAGNOSIS — G47.00 INSOMNIA, UNSPECIFIED TYPE: ICD-10-CM

## 2017-01-18 DIAGNOSIS — F33.1 DEPRESSION, MAJOR, RECURRENT, MODERATE: Primary | ICD-10-CM

## 2017-01-18 DIAGNOSIS — S09.90XS HEADACHES DUE TO OLD HEAD INJURY: ICD-10-CM

## 2017-01-18 DIAGNOSIS — Z74.09 IMPAIRED MOBILITY AND ADLS: Primary | ICD-10-CM

## 2017-01-18 PROCEDURE — 90833 PSYTX W PT W E/M 30 MIN: CPT | Mod: S$PBB,,, | Performed by: NURSE PRACTITIONER

## 2017-01-18 PROCEDURE — G8991 OTHER PT/OT GOAL STATUS: HCPCS | Mod: CJ,PO | Performed by: PHYSICAL THERAPIST

## 2017-01-18 PROCEDURE — 99999 PR PBB SHADOW E&M-EST. PATIENT-LVL III: CPT | Mod: PBBFAC,,, | Performed by: NURSE PRACTITIONER

## 2017-01-18 PROCEDURE — 99214 OFFICE O/P EST MOD 30 MIN: CPT | Mod: S$PBB,,, | Performed by: NURSE PRACTITIONER

## 2017-01-18 PROCEDURE — 90833 PSYTX W PT W E/M 30 MIN: CPT | Mod: PBBFAC | Performed by: NURSE PRACTITIONER

## 2017-01-18 PROCEDURE — 97110 THERAPEUTIC EXERCISES: CPT | Mod: PO | Performed by: PHYSICAL THERAPIST

## 2017-01-18 PROCEDURE — 97163 PT EVAL HIGH COMPLEX 45 MIN: CPT | Mod: PO | Performed by: PHYSICAL THERAPIST

## 2017-01-18 PROCEDURE — 99213 OFFICE O/P EST LOW 20 MIN: CPT | Mod: PBBFAC | Performed by: NURSE PRACTITIONER

## 2017-01-18 PROCEDURE — G8990 OTHER PT/OT CURRENT STATUS: HCPCS | Mod: CK,PO | Performed by: PHYSICAL THERAPIST

## 2017-01-18 RX ORDER — ZOLPIDEM TARTRATE 10 MG/1
10 TABLET ORAL NIGHTLY PRN
Qty: 30 TABLET | Refills: 5 | Status: SHIPPED | OUTPATIENT
Start: 2017-01-18 | End: 2017-08-09 | Stop reason: SDUPTHER

## 2017-01-18 NOTE — MR AVS SNAPSHOT
[facility] - Psychiatry  [address]  [facility] [address]  Phone: [phone]  Fax: [phone]                  Amna Chwala   2017 3:00 PM   Office Visit    Description:  Female : 1965   Provider:  Juan Carlos Childers NP   Department:  [facility] - Psychiatry           Reason for Visit     Depression           Diagnoses this Visit        Comments    Depression, major, recurrent, moderate    -  Primary     Anxiety         Insomnia, unspecified type                To Do List           Future Appointments        Provider Department Dept Phone    2017 9:45 AM Alexander Benjamin PTA [facility] [phone]    2017 9:00 AM Matt Pugh Jr., MD [facility] [phone]    2017 9:00 AM Mariel Miguel, PT [facility] [phone]    2017 9:00 AM Alexander Benjamin PTA [facility] [phone]    2/3/2017 9:00 AM Mariel Miguel, PT [facility] [phone]      Goals (5 Years of Data)     None      Follow-Up and Disposition     Return in about 8 weeks (around 3/15/2017).       These Medications        Disp Refills Start End    zolpidem (AMBIEN) 10 mg Tab 30 tablet 5 2017     Take 1 tablet (10 mg total) by mouth nightly as needed. - Oral    Pharmacy: [id number] - [place], [place] - [address] AT [facility] Ph #: [phone]         [facility]     [facility] -  Assistance  Registered nurses in the [facility] provide clinical advisement, health education, appointment booking, and other advisory services.  Call for this free service at [phone].             Medications           Message regarding Medications     Verify the changes and/or additions to your medication regime listed below are the same as discussed with your clinician today.  If any of these changes or additions are incorrect,  please notify your healthcare provider.        START taking these NEW medications        Refills    zolpidem (AMBIEN) 10 mg Tab 5    Sig: Take 1 tablet (10 mg total) by mouth nightly as needed.    Class: Normal    Route: Oral      STOP taking these medications     temazepam (RESTORIL) 15 mg Cap Take 1 capsule (15 mg total) by mouth nightly as needed.           Verify that the below list of medications is an accurate representation of the medications you are currently taking.  If none reported, the list may be blank. If incorrect, please contact your healthcare provider. Carry this list with you in case of emergency.           Current Medications     aripiprazole (ABILIFY) 5 MG Tab TAKE 1 TABLET(5 MG) BY MOUTH EVERY DAY    atorvastatin (LIPITOR) 40 MG tablet TAKE ONE TABLET BY MOUTH EVERY MORNING    butalbital-acetaminophen-caffeine -40 mg (FIORICET, ESGIC) -40 mg per tablet     carvedilol (COREG) 25 MG tablet TAKE 1 TABLET(25 MG) BY MOUTH TWICE DAILY WITH MEALS    chlorthalidone (HYGROTEN) 25 MG Tab TAKE 1 TABLET BY MOUTH EVERY DAY    desipramine (NORPRAMIN) 25 MG tablet Take 1 tablet (25 mg total) by mouth once daily.    diclofenac sodium (VOLTAREN) 1 % Gel Apply twice daily using enclosed measuring device.    divalproex (DEPAKOTE) 250 MG EC tablet Take 250 mg by mouth.     donepezil (ARICEPT) 10 MG tablet Take 1 tablet (10 mg total) by mouth 2 (two) times daily.    duloxetine (CYMBALTA) 60 MG capsule Take 1 capsule (60 mg total) by mouth 2 (two) times daily.    gabapentin (NEURONTIN) 300 MG capsule Take 3 capsules (900 mg total) by mouth 3 (three) times daily.    hydrocodone-acetaminophen 5-325mg (NORCO) 5-325 mg per tablet Take 1 tablet by mouth every 6 (six) hours as needed for Pain.    hydrocodone-acetaminophen 5-325mg (NORCO) 5-325 mg per tablet Take 1 tablet by mouth every 6 (six) hours as needed for Pain.    lamotrigine (LAMICTAL) 25 MG tablet Take 6 tablets (150 mg total) by mouth once daily.     "lorazepam (ATIVAN) 0.5 MG tablet Take 1 tablet (0.5 mg total) by mouth every 6 (six) hours as needed for Anxiety.    magnesium 200 mg Tab Take 200 mg by mouth once daily.    mefenamic acid 250 mg Cap TAKE 2 CAPSULES BY MOUTH TWICE DAILY AS NEEDED.    oxycodone-acetaminophen (PERCOCET) 5-325 mg per tablet Take 1 tablet by mouth every 8 (eight) hours as needed for Pain.    pantoprazole (PROTONIX) 40 MG tablet Take 1 tablet (40 mg total) by mouth once daily.    tiagabine (GABITRIL) 4 MG tablet Take 1 tablet (4 mg total) by mouth 2 (two) times daily as needed (headache).    tizanidine (ZANAFLEX) 4 MG tablet Take 4 mg by mouth every 6 (six) hours as needed.    topiramate (TOPAMAX) 100 MG tablet Take 1.5 tablets (150 mg total) by mouth 2 (two) times daily.    VITAMIN D2 50,000 unit capsule TAKE 1 CAPSULE BY MOUTH EVERY 7 DAYS    zolpidem (AMBIEN) 10 mg Tab Take 1 tablet (10 mg total) by mouth nightly as needed.           Clinical Reference Information           Vital Signs - Last Recorded  Most recent update: 1/18/2017  2:38 PM by Viry Callahan MA    BP Pulse Ht Wt BMI    (!) 182/92 (BP Location: Left arm, Patient Position: Sitting, BP Method: Automatic) 70 5' 4" (1.626 m) 124.3 kg (274 lb) 47.03 kg/m2      Blood Pressure          Most Recent Value    BP  (!)  182/92 [pt stated she took b/p meds today]      Allergies as of 1/18/2017     Aspirin    Imitrex [Sumatriptan]    Nsaids (Non-steroidal Anti-inflammatory Drug)    Penicillins    Shellfish Containing Products    Percocet [Oxycodone-acetaminophen]    Reglan [Metoclopramide Hcl]      Immunizations Administered on Date of Encounter - 1/18/2017     None      Instructions    [facility] - DEPARTMENT OF PSYCHIATRY   PATIENT INFORMATION    We appreciate the opportunity to participate in your medical care and hope the following protocols will make it easier for you to receive quality treatment in our department.    1. PUNCTUALITY: Your appointment is " scheduled for a fixed amount of time.  To get the benefit of your appointment, please arrive early enough to allow time for traffic, parking and registration.  If you are late for your appointment, you may have to reschedule.  Please make every effort to be on time.      2. PAYMENT FOR SERVICES:   Payments are expected at the time of service.  Please contact [phone] if you need to resolve issues involving your account at [facility] or to set up a payment plan.    3. CANCELLATION / MISSED APPOINTMENTS:   In order to receive quality care, all appointments must be kept.  Appointment may be cancelled at least 24 hours before your appointment time. If you do not give at least 24-hour notice of cancellation a fee may be billed directly to the patient.  Please note that insurance does not cover no-show charges, so you will be billed directly.  If you are consistently late, cancel, or do not show for your appointments, our department reserves the right to terminate treatment     MESSAGES- In general you can reach the department by calling [phone], between 8:00 a.m. and 5:00 p.m., Monday through Friday.  You can also use the [facility] Patient Portal.     AFTER HOURS, WEEKEND OR HOLIDAYS- For urgent questions after hours, weekends and holidays, calling the department number [phone] will connect you with a representative.  EMERGENCY-  In case of a crisis when there is a concern of harm to self or others, call 911 or the office [phone] between 8:00 a.m. and 5:00 p.m., Monday through Friday or go to the [facility] Emergency Room.    4. PRESCRIPTION REFILLS:  Prescription refills must be done at your physician office visit, You will be given a sufficient number of refills to last until your next appointment, you must come to appointments for prescriptions.   No additional refills will be approved beyond the original treatment plan. Again, please note that no additional prescriptions will be approved per patient  request.     5. FOLLOW UP APPOINTMENTS:  Follow-up appointments can be made in person at the Psychiatry Appointment Desk, online through the [facility] Portal, or by calling [phone], from 8am to 5pm, between Monday and Friday.  Patients are responsible for scheduling their own follow-up appointment,  It  Is recommended you schedule your appointment before leaving the clinic.    6. Providers are NOT ABLE to schedule appointments; all appointments must be made through the appointment department or through [facility].           PATIENTS MAY EXPERIENCE SYMPTOMS OF WITHDRAWAL IF THEY RUN OUT OF MEDICATIONS.  THE PATIENT WILL ASSUME ALL RESPONSIBILITIES OF ANY OUTCOMES WHEN THERE IS AN ISSUE WITH NONCOMPLIANCE WITH FOLLOW-UP APPOINTMENTS AND MEDICATIONS.        THERE IS TO BE NO USE OF ALCOHOL AND/OR ILLEGAL SUBSTANCES    PATIENT ARE TO GO TO THE CLOSEST EMERGENCY ROOM IF FEELING A THREAT TO THEMSELVES OR OTHER OR IF GRAVELY DISABLED    TELL US HOW WE ARE DOING.  PLEASE COMPLETE THE PATIENT SATISFACTION SURVERY  Revised December 2016

## 2017-01-18 NOTE — PATIENT INSTRUCTIONS
[facility] - DEPARTMENT OF PSYCHIATRY   PATIENT INFORMATION    We appreciate the opportunity to participate in your medical care and hope the following protocols will make it easier for you to receive quality treatment in our department.    1. PUNCTUALITY: Your appointment is scheduled for a fixed amount of time.  To get the benefit of your appointment, please arrive early enough to allow time for traffic, parking and registration.  If you are late for your appointment, you may have to reschedule.  Please make every effort to be on time.      2. PAYMENT FOR SERVICES:   Payments are expected at the time of service.  Please contact [phone] if you need to resolve issues involving your account at [facility] or to set up a payment plan.    3. CANCELLATION / MISSED APPOINTMENTS:   In order to receive quality care, all appointments must be kept.  Appointment may be cancelled at least 24 hours before your appointment time. If you do not give at least 24-hour notice of cancellation a fee may be billed directly to the patient.  Please note that insurance does not cover no-show charges, so you will be billed directly.  If you are consistently late, cancel, or do not show for your appointments, our department reserves the right to terminate treatment     MESSAGES- In general you can reach the department by calling [phone], between 8:00 a.m. and 5:00 p.m., Monday through Friday.  You can also use the [facility] Patient Portal.     AFTER HOURS, WEEKEND OR HOLIDAYS- For urgent questions after hours, weekends and holidays, calling the department number [phone] will connect you with a representative.  EMERGENCY-  In case of a crisis when there is a concern of harm to self or others, call 911 or the office [phone] between 8:00 a.m. and 5:00 p.m., Monday through Friday or go to the [facility] Emergency Room.    4. PRESCRIPTION REFILLS:  Prescription refills must be done at your physician office visit, You  will be given a sufficient number of refills to last until your next appointment, you must come to appointments for prescriptions.   No additional refills will be approved beyond the original treatment plan. Again, please note that no additional prescriptions will be approved per patient request.     5. FOLLOW UP APPOINTMENTS:  Follow-up appointments can be made in person at the Psychiatry Appointment Desk, online through the [facility] Patient Portal, or by calling [phone], from 8am to 5pm, between Monday and Friday.  Patients are responsible for scheduling their own follow-up appointment,  It  Is recommended you schedule your appointment before leaving the clinic.    6. Providers are NOT ABLE to schedule appointments; all appointments must be made through the appointment department or through [facility].           PATIENTS MAY EXPERIENCE SYMPTOMS OF WITHDRAWAL IF THEY RUN OUT OF MEDICATIONS.  THE PATIENT WILL ASSUME ALL RESPONSIBILITIES OF ANY OUTCOMES WHEN THERE IS AN ISSUE WITH NONCOMPLIANCE WITH FOLLOW-UP APPOINTMENTS AND MEDICATIONS.        THERE IS TO BE NO USE OF ALCOHOL AND/OR ILLEGAL SUBSTANCES    PATIENT ARE TO GO TO THE CLOSEST EMERGENCY ROOM IF FEELING A THREAT TO THEMSELVES OR OTHER OR IF GRAVELY DISABLED    TELL US HOW WE ARE DOING.  PLEASE COMPLETE THE PATIENT SATISFACTION SURVERY  Revised December 2016

## 2017-01-18 NOTE — PROGRESS NOTES
OUTPATIENT NEUROLOGICAL REHABILITATION  PHYSICAL THERAPY EVALUATION    Name: Amna Chawla  Clinic Number: [id number]    Diagnosis:   M54.81 (ICD-10-CM) - Bilateral occipital neuralgia   G93.1 (ICD-10-CM) - Anoxic brain injury   R51 (ICD-10-CM) - Cervicogenic headache       Physician: Missy Rock MD  Treatment Orders: PT Eval and Treat  Past Medical History   Diagnosis Date    Asthma     Brain anoxic injury     Coronary artery disease     Defibrillator activation 2013    Depression     History of sudden cardiac arrest 2/2013     PEA arrest with subsequent long-QT    Hypertension     Pacemaker 2013    Seizures     Stroke      weakness lt side       Evaluation Date: 1/18/2017  Visit #: 1  Plan of care expiration: 4/12/2017  Precautions: universal    Functional Limitations Reports - G Codes  Category: other   Tool: cervical rotation ROM, Oswestry Disability Index, Pain Disability Index  Score: right= 46 degrees, left= 33 degrees, 50%, 55%  Current: CK at least 40% < 60% impaired, limited or restricted  Goal: CJ at least 20% < 40% impaired, limited or restricted    History   Medical Diagnosis: occipital neuralgia, cervicogenic HA, anoxic BI  PT Diagnosis: impaired posture, decreased strength, frequent falls, pain  Chief complaint: HA, imbalance  History of Present Illness: Amna is a 51 y.o. female that presents to [facility] secondary to chronic headaches after a brain injury. Pt reports onset of left shoulder pain since last week. Daily HA- at times pt reports having to go to ER for HA (2x/ month), pt takes 2 prescription meds and received Botox shots for HA.  PMHx: right carpal tunnel surgery 12/16, plan to perform on left in future, fx bone in left foot 6/2016. History of falling ~2x/month    Prior Therapy: last PT 9/2016 for ankle  Social History: pt has a young daughter  Place of Residence (Steps/Adaptations/Levels)/ assistance available: lives with sister, child 9 yrs).  "Lives in senior center, all level surfaces  Previous functional status includes: driving, worked full time   Current functional status:  no driving, intermittently uses SPC for ambulation, frequent falls, frequent HA  DME owned: SPC  Work/Job description:  Disabled       Subjective   Pt stated goals: "do something for my neck and headaches, not have to use my cane"  Family present/states: NA  Pain: 10/10 left shoulder    Objective   - Follows commands:  yes  - Speech: no deficits     Mental status: alert, oriented to person, place, and time  Appearance: Casually dressed  Behavior:  calm and cooperative  Attention Span and Concentration:  Impaired memory    Dominant hand:  right     Posture Alignment :forward head rounded shoulders    Sensation:  Light Touch: Impaired: pt reports tingling in hands           Proprioception:   Intact    Tone: 0 - No increase in muscle tone  Limbs/muscles affected: NA    Visual/Auditory: denies changes   Convergence point: 23 cm    Coordination:   - fine motor: thumb to fingertip WFLs  - UE coordination: supination/ pronation WFLs    - LE coordination:  WFLs for ambualtion    ROM:   UPPER EXTREMITY--AROM/PROM  (R) UE: limited as follows: shoulder AROM in supine: flex= 115, abd= 98 degres  (L) UE: WFLs         Cervical ROM:   Flex= 30 degrees  Ext= 45 degrees  Rotation: right= 46 degrees, left= 33 degrees  SB: right= 17 degrees, left= 23 degrees    RANGE OF MOTION--LOWER EXTREMITIES  (R) LE Hip: equal bilaterally   Knee: equal bilaterally   Ankle: equal bilaterally    (L) LE: Hip: equal bilaterally   Knee: equal bilaterally   Ankle: equal bilaterally    Strength: manual muscle test grades below   Upper Extremity Strength   RENEA ROY   Shoulder Flexion: 4+/5 4/5   Shoulder Abduction: 4+/5 4/5   Shoulder Extension: 4+/5 4/5   Shoulder External  Rotation: 4+/5 4/5   Shoulder Internal  Rotation: 4+/5 4/5   Elbow Flexion: 5/5 5/5   Elbow Extension: 5/5 5/5 "   Wrist Flexion: NT NT   Wrist Extension: NT NT     Lower Extremity Strength   RLE LLE   Hip Flexion: 4+/5 4/5   Hip Extension:  4+/5 4/5   Hip Abduction: 4+/5 4/5   Hip Adduction: NT NT   Knee Extension: 5/5 5/5   Knee Flexion: 5/5 5/5   Ankle Dorsiflexion: NT NT   Ankle Plantarflexion: NT NT   Ankle Inversion: NT NT   Ankle Eversion: NT NT        Evaluation   Single Limb Stance R LE NT  (<10 sec = HIGH FALL RISK)   Single Limb Stance L LE NT  (<10 sec = HIGH FALL RISK)   30 second Chair Rise NT   5 times sit-stand NT     Postural control:  Static standing balance without support= good  Dynamic standing balance without UE support= good    Gait Assessment:   - AD used: none- SPC  - Assistance: mod I  - Distance: community, can vary  - Curb: Mod I with intermittent UE support  - Ramp:  Mod I      GAIT DEVIATIONS:  Amna displays the following deviations with ambulation: decreased speed     Impairments contributing to deviations: impaired motor control, decresaed     Endurance Deficit: good for eval      Evaluation   Timed Up and Go NT   Self Selected Walking Speed NT   Fast Walking Speed NT     Functional Mobility (Bed mobility, transfers)  Bed mobility: I  Supine to sit: I  Sit to supine: I  Rolling: I  Transfers to bed: I  Transfers to toilet: Mod I  Sit to stand:  Mod I  Stand pivot:  Mod I  Car transfers: Mod I  Wheelchair mobility: NA  Floor transfers: NT    FOTO: Oswestry Disability Index= 50%  Pain Disability= 55  Brain Injury Survey= 50%    Written Home Exercises Provided: eval- upper trap stretch, levator scap stretch, chin tucks, pencil push ups  Pt demo good understanding of the education provided. Amna demonstrated good return demonstration of activities.     Education provided re:role of PT, goals for PT, scheduling - pt verbalized understanding.     Amna verbalized good understanding of education provided.   Pt has no cultural, educational or language barriers to learning provided.    Pt participated  in the following treatment today:  therapeutic exercise x 11 minutes:   Upper trap stretch 3x  Levator scap stretch 3x  Chin tucks 10x  Pencil push ups 2x    Assessment   This is a 51 y.o. female referred to outpatient physical therapy and presents with a medical diagnosis of cervicogenic HA, occipital neuralgia, and anoxic brain injury and demonstrates limitations as described in the problem list. Pt experiences unpredictable severe HA causing ER visits. Pt also has reports recent onset of left shoulder pain, history of carpal tunnel surgery, decreased memory, and pt must rely on public transportation. Pt has a history of ~ 2 falls/ month. Due to pt's deficits, pt is not able to work, visits ER frequently due to pain, has frequent falls, and is not able to live independently or care for young child without assistance. PT is warranted to address impairments to decrease frequency and severity of HA to improve consistency with daily functioning. Balance will also be addressed as needed. Pt's condition is unstable due to pt's report of frequent fluctuations in pain and mobility. Pt's average level of impairment due to HA is ~53%, pt is anticipated to improve impairment level to 26% by d/c.    Pt rehab potential is Good. Pt will benefit from continuing skilled outpatient physical therapy to address the deficits listed below in the problem list, provide pt/family education and to maximize pt's level of independence in the home and community environment.       Medical necessity is demonstrated by the following IMPAIRMENTS/PROMBLEM LIST:   1. Fall Risk - impaired balance   2. Weakness   3. Impaired muscle tone  4. Decreased ROM  5. Gait deviations   6. Impaired ambulation   7. Decreased activity tolerance   8. Difficulty participating in daily activities   9. Continued inability to participate in vocational pursuits   10. Requires skilled supervision to complete and progress HEP     Anticipated barriers to physical  therapy: history of HA since injury    Pt's spiritual, cultural and educational needs considered and pt agreeable to plan of care and goals as stated below:     GOALS:   Short term goals: 6 weeks, pt agrees to goals set.  1. Pt will perform HEP for UE/ scapular strengthening and cervical ROM/ stability with supervision to improve carryover of progress and decrease pain.  2. Pt will demonstrate improved cervical flex/ ext ROM by 10 degrees to demonstrate improved ROM to improve posture.  3. Pt will demonstrate improved cervical rotation to 50 degrees to decrease pain.   4. Pt will report decreased impairment per Oswestry by scoring impairment at 40% or less.  5. Pt will demonstrate improved right shoulder AROM for flexion to 150 degrees to be WFLs.  6. Pt will report decreased left shoulder pain to 5/10 with AROM to improve mobility and demonstrate decreased pain.  7. Assess FGA and set goals as needed for balance.    Long term goals: 12 weeks, pt agrees to goals set  8. Pt will report decreased HA to 3x/ week or less with no ER visits due to pain x 4 weeks.  9. Pt will demonstrate improved cervical flex/ ext AROM to 60 degrees to demonstrate normal/ functional ROM and to decrease pain.  10. Pt will demonstrate improved cervical rotation to 65 degrees to demonstrate improved mobility and decrease HA.  11. Pt will report decreased impairment per Oswestry by scoring impairment at 25% or less.  12. Pt will report decreased impact of pain on daily functioning by scoring 25% or less on Pain Disability Index.  13. Pt will demonstrate right shoulder AROM for abd to 130 degrees to be WFLs.       Plan   Outpatient physical therapy 2-3 times weekly for 12 weeks to include: Pt Education, HEP, therapeutic exercises, gait training, neuromuscular re-education, therapeutic activities, manual therapy, joint mobilizations, and modalities PRN to achieve established goals. Pt may be seen by PTA as part of the rehabilitation team.        Mariel Miguel, PT  01/18/2017      I certify the need for these services furnished under this plan of treatment and while under my care.  ____________________________________ Physician/Referring Practitioner   Date of Signature

## 2017-01-18 NOTE — PROGRESS NOTES
"Outpatient Psychiatry Follow-Up Visit (MD/NP)    1/18/2017    Clinical Status of Patient:  Outpatient (Ambulatory)    Chief Complaint:  Amna Chawla is a 51 y.o. female who presents today for follow-up of depression and anxiety.  Met with patient.      Interval History and Content of Current Session:  Interim Events/Subjective Report/Content of Current Session:     No Show appt in Nov 2016    Abilify 5mg po q day  Cymbalta 60mg po bid  Restoril 15mg po q hs    Ativan 0.5mg po q 6 hours from Dr. Fragoso.  Also taking Neurontin, Depakote, Lamictal and Gabatrol PRN for migraines    States she has taken 3 Restoril and still not sleeping. Sleeps 5-6 hours, to bed at 9pm up at 3am. She cant read because she cant turn on a light because it will keep others up in the house. States the reason Ambien wasn't working in the past is because she was "on overload".  Mood described as "fair", family members dont understand her, her BF expects her to remember things but "thats not going to happen".  Easily frustrated and angered, easily irritated.  BF tells patient she has anger issues, she said its only when "you sent me off".      Not seeing therapist.  Negative attitude, takes no responsibility for her choices. Spent most of the appointment complaining about BF and him not understanding how sick she was.     Trazodone, Seroquel, Remeron, Restoril, Doxepin in the past with poor results.    Psychotherapy:  · Target symptoms: depression, anxiety   · Why chosen therapy is appropriate versus another modality: relevant to diagnosis  · Outcome monitoring methods: self-report  · Therapeutic intervention type: insight oriented psychotherapy  · Topics discussed/themes: symptom recognition, financial stressors  · The patient's response to the intervention is reluctant. The patient's progress toward treatment goals is not progressing.   · Duration of intervention: 16 minutes.    Review of Systems   GENERAL: No weight gain or loss   SKIN: No " "rashes or lacerations   HEAD: No headaches   EYES: No exophthalmos, jaundice or blindness   EARS: No dizziness, tinnitus or hearing loss   NOSE: No changes in smell   MOUTH & THROAT: No dyskinetic movements or obvious goiter   CHEST: No shortness of breath, hyperventilation or cough   CARDIOVASCULAR: No tachycardia or chest pain   ABDOMEN: No nausea, vomiting, pain, constipation or diarrhea   URINARY: No frequency, dysuria or sexual dysfunction   ENDOCRINE: No polydipsia, polyuria   MUSCULOSKELETAL: as above, walks with cane  NEUROLOGIC: + weakness, no sensory changes, seizures, confusion, memory loss, tremor or other abnormal movements      Psychiatric Review Of Systems:  sleep: poor  appetite changes: no  weight changes: no  energy/anergy: no  interest/pleasure/anhedonia: no  somatic symptoms: no  libido: no  anxiety/panic: yes      Past Medical, Family and Social History: The patient's past medical, family and social history have been reviewed and updated as appropriate within the electronic medical record - see encounter notes.    Compliance: no    Side effects: None    Risk Parameters:  Patient reports no suicidal ideation  Patient reports no homicidal ideation  Patient reports no self-injurious behavior  Patient reports no violent behavior    Exam (detailed: at least 9 elements; comprehensive: all 15 elements)   Constitutional  Vitals:  Most recent vital signs, dated less than 90 days prior to this appointment, were reviewed.   Vitals:    01/18/17 1436   BP: (!) 182/92   Pulse: 70   Weight: 124.3 kg (274 lb)   Height: 5' 4" (1.626 m)        General:  unremarkable, age appropriate, casually dressed, neatly groomed     Musculoskeletal  Muscle Strength/Tone:  no dyskinesia, no dystonia, no tremor, no tic   Gait & Station:  slow, uses cane     Psychiatric  Speech:  soft   Mood & Affect:  anxious, depressed, irritable, sad  blunted   Thought Process:  normal and logical   Associations:  intact   Thought Content:  " normal, no suicidality, no homicidality, delusions, or paranoia   Insight:  has awareness of illness   Judgement: behavior is adequate to circumstances   Orientation:  grossly intact   Memory: intact for content of interview   Language: grossly intact   Attention Span & Concentration:  able to focus   Fund of Knowledge:  intact and appropriate to age and level of education     Assessment and Diagnosis   Status/Progress: Based on the examination today, the patient's problem(s) is/are failing to respond as expected to treatment.  New problems have not been presented today.   Co-morbidities and Lack of compliance are complicating management of the primary condition.  There are no active rule-out diagnoses for this patient at this time.     General Impression:       ICD-10-CM ICD-9-CM   1. Depression, major, recurrent, moderate F33.1 296.32   2. Anxiety F41.9 300.00   3. Insomnia, unspecified type G47.00 780.52     R/O Personality Disorder  Intervention/Counseling/Treatment Plan   · Medication Management: The risks and benefits of medication were discussed with the patient.   · Cymbalta 60mg 2 caps po q d  · D/C Restoril  · Abilify 5mg po q d  · Ambien 10mg po q hs      Return to Clinic: 2 months

## 2017-01-23 ENCOUNTER — DOCUMENTATION ONLY (OUTPATIENT)
Dept: REHABILITATION | Facility: HOSPITAL | Age: 52
End: 2017-01-23

## 2017-01-23 ENCOUNTER — CLINICAL SUPPORT (OUTPATIENT)
Dept: REHABILITATION | Facility: HOSPITAL | Age: 52
End: 2017-01-23
Attending: PSYCHIATRY & NEUROLOGY
Payer: MEDICARE

## 2017-01-23 DIAGNOSIS — S06.9X0S COGNITIVE DEFICIT AS LATE EFFECT OF TRAUMATIC BRAIN INJURY: ICD-10-CM

## 2017-01-23 DIAGNOSIS — F06.8 COGNITIVE DEFICIT AS LATE EFFECT OF TRAUMATIC BRAIN INJURY: ICD-10-CM

## 2017-01-23 DIAGNOSIS — M54.2 CERVICALGIA: ICD-10-CM

## 2017-01-23 DIAGNOSIS — Z74.09 IMPAIRED MOBILITY AND ADLS: ICD-10-CM

## 2017-01-23 DIAGNOSIS — M25.571 RIGHT ANKLE PAIN, UNSPECIFIED CHRONICITY: ICD-10-CM

## 2017-01-23 DIAGNOSIS — Z78.9 IMPAIRED MOBILITY AND ADLS: ICD-10-CM

## 2017-01-23 PROBLEM — S09.90XS HEADACHES DUE TO OLD HEAD INJURY: Status: ACTIVE | Noted: 2017-01-23

## 2017-01-23 PROBLEM — R29.898 DECREASED ROM OF NECK: Chronic | Status: ACTIVE | Noted: 2017-01-23

## 2017-01-23 PROBLEM — S09.90XS HEADACHES DUE TO OLD HEAD INJURY: Chronic | Status: ACTIVE | Noted: 2017-01-23

## 2017-01-23 PROBLEM — G44.309 HEADACHES DUE TO OLD HEAD INJURY: Chronic | Status: ACTIVE | Noted: 2017-01-23

## 2017-01-23 PROBLEM — R29.898 DECREASED ROM OF NECK: Status: ACTIVE | Noted: 2017-01-23

## 2017-01-23 PROBLEM — G44.309 HEADACHES DUE TO OLD HEAD INJURY: Status: ACTIVE | Noted: 2017-01-23

## 2017-01-23 PROCEDURE — 97140 MANUAL THERAPY 1/> REGIONS: CPT | Mod: PO

## 2017-01-23 PROCEDURE — 97110 THERAPEUTIC EXERCISES: CPT | Mod: PO

## 2017-01-23 NOTE — PROGRESS NOTES
I, Mariel Miguel, ARETHAT, met with Alexander Benjamin PTA to discuss this pt's POC. Pt presents with chronic HA after anoxic BI. Pt has significant forward head posture and recent onset of left shoulder pain. Right shoulder flexion and abduction are also limited. Address cervical ROM in all directions, right shoulder ROM, and cervical and scapular strengthening to improve posture. May perform soft tissue manipulation and provide modalities as needed.    Mariel Miguel DPT  1/23/2017    Alexander Benjamin PTA  1/23/2017

## 2017-01-23 NOTE — PROGRESS NOTES
"                                                      Physical Therapy Progress Note     Name: Amna Chawla  Clinic Number: [id number]  Diagnosis:   Encounter Diagnoses   Name Primary?   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         Subjective   Pt reports: Painful neck  Pain Scale:  7/10 pre; 6/10 following interventions    Objective     Patient received Amna received therapeutic exercises to develop strength, endurance, ROM, flexibility, posture and core stabilization for 15 minutes including:  therapy with activities as follows:     Supine: Shoulder flexion with dowel 2 x 10 to 70 degrees               Chess press with dowel 2 x 10                Chin tucks 2  x10   Seated: UT stretch 3 x 30"               Levator stretch 3  X 30"                  Cervical rotation x 5                Lateral flexion x 5       MH to B cervical spine supine 10 minutes    Manual Therapy 20 minutes MFR/STM B UT, Cervical paraspinals, Scap mobilization, SCM.    Written Home Exercises: Provided   Pt demo good understanding of the education provided. Amna demonstrated good return demonstration of activities.     Education provided re: POC, HEP  No spiritual or educational barriers to learning provided    Pt has no cultural, educational or language barriers to learning provided.    Assessment "   Tolerated treatment fair. Very TTP Left UT and anterior cervical region. Pain reduced following interventions. This is a 51 y.o. female referred to outpatient physical therapy and presents with a medical diagnosis of cervicogenic HA, occipital neuralgia, and anoxic brain injury and demonstrates limitations as described in the problem list. Pt experiences unpredictable severe HA causing ER visits. Pt also has reports recent onset of left shoulder pain, history of carpal tunnel surgery, decreased memory, and pt must rely on public transportation. Pt has a history of ~ 2 falls/ month. Due to pt's deficits, pt is not able to work, visits ER frequently due to pain, has frequent falls, and is not able to live independently or care for young child without assistance. PT is warranted to address impairments to decrease frequency and severity of HA to improve consistency with daily functioning. Balance will also be addressed as needed. Pt's condition is unstable due to pt's report of frequent fluctuations in pain and mobility. Pt's average level of impairment due to HA is ~53%, pt is anticipated to improve impairment level to 26% by d/c.     Pt rehab potential is Good. Pt will benefit from continuing skilled outpatient physical therapy to address the deficits listed below in the problem list, provide pt/family education and to maximize pt's level of independence in the home and community environment.         Medical necessity is demonstrated by the following IMPAIRMENTS/PROMBLEM LIST:   1. Fall Risk - impaired balance   2. Weakness   3. Impaired muscle tone  4. Decreased ROM  5. Gait deviations   6. Impaired ambulation   7. Decreased activity tolerance   8. Difficulty participating in daily activities   9. Continued inability to participate in vocational pursuits   10. Requires skilled supervision to complete and progress HEP      Anticipated barriers to physical therapy: history of HA since injury     Pt's spiritual,  cultural and educational needs considered and pt agreeable to plan of care and goals as stated below:      GOALS:   Short term goals: 6 weeks, pt agrees to goals set.  1. Pt will perform HEP for UE/ scapular strengthening and cervical ROM/ stability with supervision to improve carryover of progress and decrease pain.  2. Pt will demonstrate improved cervical flex/ ext ROM by 10 degrees to demonstrate improved ROM to improve posture.  3. Pt will demonstrate improved cervical rotation to 50 degrees to decrease pain.   4. Pt will report decreased impairment per Oswestry by scoring impairment at 40% or less.  5. Pt will demonstrate improved right shoulder AROM for flexion to 150 degrees to be WFLs.  6. Pt will report decreased left shoulder pain to 5/10 with AROM to improve mobility and demonstrate decreased pain.  7. Assess FGA and set goals as needed for balance.     Long term goals: 12 weeks, pt agrees to goals set  8. Pt will report decreased HA to 3x/ week or less with no ER visits due to pain x 4 weeks.  9. Pt will demonstrate improved cervical flex/ ext AROM to 60 degrees to demonstrate normal/ functional ROM and to decrease pain.  10. Pt will demonstrate improved cervical rotation to 65 degrees to demonstrate improved mobility and decrease HA.  11. Pt will report decreased impairment per Oswestry by scoring impairment at 25% or less.  12. Pt will report decreased impact of pain on daily functioning by scoring 25% or less on Pain Disability Index.  13. Pt will demonstrate right shoulder AROM for abd to 130 degrees to be WFLs.             Plan   Continue PT 2-3 x weekly under established Plan of Care, with treatment to include: pt education, HEP, therapeutic exercises, neuromuscular re-education/balance exercises, therapeutic activities, joint mobilizations, and modalities PRN, to work towards established goals. Pt may be seen by PTA to carry out plan of care.     Alexander Benjamin, PTA   01/23/2017

## 2017-01-23 NOTE — PLAN OF CARE
OUTPATIENT NEUROLOGICAL REHABILITATION  PHYSICAL THERAPY EVALUATION    Name: Amna Chawla  Clinic Number: [id number]    Diagnosis:   M54.81 (ICD-10-CM) - Bilateral occipital neuralgia   G93.1 (ICD-10-CM) - Anoxic brain injury   R51 (ICD-10-CM) - Cervicogenic headache       Physician: Missy Rock MD  Treatment Orders: PT Eval and Treat  Past Medical History   Diagnosis Date    Asthma     Brain anoxic injury     Coronary artery disease     Defibrillator activation 2013    Depression     History of sudden cardiac arrest 2/2013     PEA arrest with subsequent long-QT    Hypertension     Pacemaker 2013    Seizures     Stroke      weakness lt side       Evaluation Date: 1/18/2017  Visit #: 1  Plan of care expiration: 4/12/2017  Precautions: universal    Functional Limitations Reports - G Codes  Category: other   Tool: cervical rotation ROM, Oswestry Disability Index, Pain Disability Index  Score: right= 46 degrees, left= 33 degrees, 50%, 55%  Current: CK at least 40% < 60% impaired, limited or restricted  Goal: CJ at least 20% < 40% impaired, limited or restricted    History   Medical Diagnosis: occipital neuralgia, cervicogenic HA, anoxic BI  PT Diagnosis: impaired posture, decreased strength, frequent falls, pain  Chief complaint: HA, imbalance  History of Present Illness: Amna is a 51 y.o. female that presents to [facility] secondary to chronic headaches after a brain injury. Pt reports onset of left shoulder pain since last week. Daily HA- at times pt reports having to go to ER for HA (2x/ month), pt takes 2 prescription meds and received Botox shots for HA.  PMHx: right carpal tunnel surgery 12/16, plan to perform on left in future, fx bone in left foot 6/2016. History of falling ~2x/month    Prior Therapy: last PT 9/2016 for ankle  Social History: pt has a young daughter  Place of Residence (Steps/Adaptations/Levels)/ assistance available: lives with sister, child 9 yrs).  "[facility]ives in senior center, all level surfaces  Previous functional status includes: driving, worked full time   Current functional status:  no driving, intermittently uses SPC for ambulation, frequent falls, frequent HA  DME owned: SPC  Work/Job description:  Disabled       Subjective   Pt stated goals: "do something for my neck and headaches, not have to use my cane"  Family present/states: NA  Pain: 10/10 left shoulder    Objective   - Follows commands:  yes  - Speech: no deficits     Mental status: alert, oriented to person, place, and time  Appearance: Casually dressed  Behavior:  calm and cooperative  Attention Span and Concentration:  Impaired memory    Dominant hand:  right     Posture Alignment :forward head rounded shoulders    Sensation:  Light Touch: Impaired: pt reports tingling in hands           Proprioception:   Intact    Tone: 0 - No increase in muscle tone  Limbs/muscles affected: NA    Visual/Auditory: denies changes   Convergence point: 23 cm    Coordination:   - fine motor: thumb to fingertip WFLs  - UE coordination: supination/ pronation WFLs    - LE coordination:  WFLs for ambualtion    ROM:   UPPER EXTREMITY--AROM/PROM  (R) UE: limited as follows: shoulder AROM in supine: flex= 115, abd= 98 degres  (L) UE: WFLs         Cervical ROM:   Flex= 30 degrees  Ext= 45 degrees  Rotation: right= 46 degrees, left= 33 degrees  SB: right= 17 degrees, left= 23 degrees    RANGE OF MOTION--LOWER EXTREMITIES  (R) LE Hip: equal bilaterally   Knee: equal bilaterally   Ankle: equal bilaterally    (L) LE: Hip: equal bilaterally   Knee: equal bilaterally   Ankle: equal bilaterally    Strength: manual muscle test grades below   Upper Extremity Strength   RENEA ROY   Shoulder Flexion: 4+/5 4/5   Shoulder Abduction: 4+/5 4/5   Shoulder Extension: 4+/5 4/5   Shoulder External  Rotation: 4+/5 4/5   Shoulder Internal  Rotation: 4+/5 4/5   Elbow Flexion: 5/5 5/5   Elbow Extension: 5/5 5/5 "   Wrist Flexion: NT NT   Wrist Extension: NT NT     Lower Extremity Strength   RLE LLE   Hip Flexion: 4+/5 4/5   Hip Extension:  4+/5 4/5   Hip Abduction: 4+/5 4/5   Hip Adduction: NT NT   Knee Extension: 5/5 5/5   Knee Flexion: 5/5 5/5   Ankle Dorsiflexion: NT NT   Ankle Plantarflexion: NT NT   Ankle Inversion: NT NT   Ankle Eversion: NT NT        Evaluation   Single Limb Stance R LE NT  (<10 sec = HIGH FALL RISK)   Single Limb Stance L LE NT  (<10 sec = HIGH FALL RISK)   30 second Chair Rise NT   5 times sit-stand NT     Postural control:  Static standing balance without support= good  Dynamic standing balance without UE support= good    Gait Assessment:   - AD used: none- SPC  - Assistance: mod I  - Distance: community, can vary  - Curb: Mod I with intermittent UE support  - Ramp:  Mod I      GAIT DEVIATIONS:  Amna displays the following deviations with ambulation: decreased speed     Impairments contributing to deviations: impaired motor control, decresaed     Endurance Deficit: good for eval      Evaluation   Timed Up and Go NT   Self Selected Walking Speed NT   Fast Walking Speed NT     Functional Mobility (Bed mobility, transfers)  Bed mobility: I  Supine to sit: I  Sit to supine: I  Rolling: I  Transfers to bed: I  Transfers to toilet: Mod I  Sit to stand:  Mod I  Stand pivot:  Mod I  Car transfers: Mod I  Wheelchair mobility: NA  Floor transfers: NT    FOTO: Oswestry Disability Index= 50%  Pain Disability= 55  Brain Injury Survey= 50%    Written Home Exercises Provided: eval- upper trap stretch, levator scap stretch, chin tucks, pencil push ups  Pt demo good understanding of the education provided. Amna demonstrated good return demonstration of activities.     Education provided re:role of PT, goals for PT, scheduling - pt verbalized understanding.     Amna verbalized good understanding of education provided.   Pt has no cultural, educational or language barriers to learning provided.    Pt participated  in the following treatment today:  therapeutic exercise x 11 minutes:   Upper trap stretch 3x  Levator scap stretch 3x  Chin tucks 10x  Pencil push ups 2x    Assessment   This is a 51 y.o. female referred to outpatient physical therapy and presents with a medical diagnosis of cervicogenic HA, occipital neuralgia, and anoxic brain injury and demonstrates limitations as described in the problem list. Pt experiences unpredictable severe HA causing ER visits. Pt also has reports recent onset of left shoulder pain, history of carpal tunnel surgery, decreased memory, and pt must rely on public transportation. Pt has a history of ~ 2 falls/ month. Due to pt's deficits, pt is not able to work, visits ER frequently due to pain, has frequent falls, and is not able to live independently or care for young child without assistance. PT is warranted to address impairments to decrease frequency and severity of HA to improve consistency with daily functioning. Balance will also be addressed as needed. Pt's condition is unstable due to pt's report of frequent fluctuations in pain and mobility. Pt's average level of impairment due to HA is ~53%, pt is anticipated to improve impairment level to 26% by d/c.    Pt rehab potential is Good. Pt will benefit from continuing skilled outpatient physical therapy to address the deficits listed below in the problem list, provide pt/family education and to maximize pt's level of independence in the home and community environment.       Medical necessity is demonstrated by the following IMPAIRMENTS/PROMBLEM LIST:   1. Fall Risk - impaired balance   2. Weakness   3. Impaired muscle tone  4. Decreased ROM  5. Gait deviations   6. Impaired ambulation   7. Decreased activity tolerance   8. Difficulty participating in daily activities   9. Continued inability to participate in vocational pursuits   10. Requires skilled supervision to complete and progress HEP     Anticipated barriers to physical  therapy: history of HA since injury    Pt's spiritual, cultural and educational needs considered and pt agreeable to plan of care and goals as stated below:     GOALS:   Short term goals: 6 weeks, pt agrees to goals set.  1. Pt will perform HEP for UE/ scapular strengthening and cervical ROM/ stability with supervision to improve carryover of progress and decrease pain.  2. Pt will demonstrate improved cervical flex/ ext ROM by 10 degrees to demonstrate improved ROM to improve posture.  3. Pt will demonstrate improved cervical rotation to 50 degrees to decrease pain.   4. Pt will report decreased impairment per Oswestry by scoring impairment at 40% or less.  5. Pt will demonstrate improved right shoulder AROM for flexion to 150 degrees to be WFLs.  6. Pt will report decreased left shoulder pain to 5/10 with AROM to improve mobility and demonstrate decreased pain.  7. Assess FGA and set goals as needed for balance.    Long term goals: 12 weeks, pt agrees to goals set  8. Pt will report decreased HA to 3x/ week or less with no ER visits due to pain x 4 weeks.  9. Pt will demonstrate improved cervical flex/ ext AROM to 60 degrees to demonstrate normal/ functional ROM and to decrease pain.  10. Pt will demonstrate improved cervical rotation to 65 degrees to demonstrate improved mobility and decrease HA.  11. Pt will report decreased impairment per Oswestry by scoring impairment at 25% or less.  12. Pt will report decreased impact of pain on daily functioning by scoring 25% or less on Pain Disability Index.  13. Pt will demonstrate right shoulder AROM for abd to 130 degrees to be WFLs.       Plan   Outpatient physical therapy 2-3 times weekly for 12 weeks to include: Pt Education, HEP, therapeutic exercises, gait training, neuromuscular re-education, therapeutic activities, manual therapy, joint mobilizations, and modalities PRN to achieve established goals. Pt may be seen by PTA as part of the rehabilitation team.        Mariel Miguel, PT  01/18/2017      I certify the need for these services furnished under this plan of treatment and while under my care.  ____________________________________ Physician/Referring Practitioner   Date of Signature

## 2017-01-24 ENCOUNTER — TELEPHONE (OUTPATIENT)
Dept: ORTHOPEDICS | Facility: CLINIC | Age: 52
End: 2017-01-24

## 2017-01-25 ENCOUNTER — OFFICE VISIT (OUTPATIENT)
Dept: ORTHOPEDICS | Facility: CLINIC | Age: 52
End: 2017-01-25
Payer: MEDICARE

## 2017-01-25 VITALS
BODY MASS INDEX: 46.78 KG/M2 | WEIGHT: 274 LBS | SYSTOLIC BLOOD PRESSURE: 136 MMHG | HEART RATE: 72 BPM | DIASTOLIC BLOOD PRESSURE: 85 MMHG | HEIGHT: 64 IN

## 2017-01-25 DIAGNOSIS — G56.02 LEFT CARPAL TUNNEL SYNDROME: Primary | ICD-10-CM

## 2017-01-25 PROCEDURE — 99213 OFFICE O/P EST LOW 20 MIN: CPT | Mod: PBBFAC | Performed by: PLASTIC SURGERY

## 2017-01-25 PROCEDURE — 99999 PR PBB SHADOW E&M-EST. PATIENT-LVL III: CPT | Mod: PBBFAC,,, | Performed by: PLASTIC SURGERY

## 2017-01-25 PROCEDURE — 99213 OFFICE O/P EST LOW 20 MIN: CPT | Mod: 24,S$PBB,, | Performed by: PLASTIC SURGERY

## 2017-01-25 NOTE — PROGRESS NOTES
"Ms. Chawla is here today for a post-operative visit.she is 7 weeks status post right open carpal tunnel release on 12/9 . she reports that she is doing well, she states that still has some soreness in the palm and wrist. She denies any redness or drainage.  she denies fever, chills, and sweats since the time of the surgery.  She would like to pursue surgery of the left hand ASAP.  She complains of numbness and tingling at night in left fingers.    Physical exam:    Vitals:    01/25/17 0857 01/25/17 0858   BP: 136/85    Pulse: 72    Weight: 124.3 kg (274 lb)    Height: 5' 4" (1.626 m)    PainSc: 0-No pain 0-No pain   PainLoc: Hand       Incision is well healed, Mild hyperemia,  There is no erythema or exudate.  There is no sign of any infection. she is NVI.  good opposition  Left hand: + tinels and durken's over carpal tunnel, FAROM all digits, NVI in M/R/U distributions, good opposition      Assessment: status post right open carpal tunnel release, left open carpal tunnel release    Plan:  Amna was seen today for pain.    Diagnoses and all orders for this visit:    Left carpal tunnel syndrome  -     Vital signs; Standing  -     Diet NPO; Standing  -     Case Request Operating Room: RELEASE-CARPAL TUNNEL  -     Place in Outpatient; Standing    Other orders  -     ceFAZolin (ANCEF) 2 g in dextrose 5 % 100 mL IVPB; Inject 2 g into the vein On call Procedure (Surgery).      -- Continue Range massage of right scar daily  - Tylenol 500 mg and/or Ibuprofen 400mg every 4-6 hours with food for pain as tolerated  - left OCTR next friday  "

## 2017-01-26 NOTE — PLAN OF CARE
"Date: 01/16/2017    Start Time:  1030  Stop Time:  1115      OUTPATIENT NEUROLOGICAL REHABILITATION  SPEECH THERAPY EVALUATION    Subjective/History  Onset Date:  February 7, 2013  Primary Diagnosis:  Anoxic Brain Injury  Treatment Diagnosis:  Cognitive-Linguistic Impairment  Referring Provider:  Dr. Rock  Orders:  for evaluation and treat  Current Medical History:  Mrs. Chawla presents to the [facility] secondary to the diagnosis of anoxic brain injury. She suffered a cardiac arrest, CVA, and fell and hit her head and suffered a concussion in 2013. She had some complications while hospitalized during that time. She now has a pacemaker and defibrillator and suffers with depression and headaches.  No family was present during this evaluation to provide history. She took the [facility]TA LIFT today.  Past Medical History:   Past Medical History   Diagnosis Date    Asthma     Brain anoxic injury     Coronary artery disease     Defibrillator activation 2013    Depression     History of sudden cardiac arrest 2/2013     PEA arrest with subsequent long-QT    Hypertension     Pacemaker 2013    Seizures     Stroke      weakness lt side   SUMMARY AND RECOMMENDATIONS from neuropsych testing:   "Ms. Chawla was referred for Neuropsychological Evaluation on an outpatient basis due to subjective residual memory impairment following acute cardiac arrest complicated by a stroke and closed head injury (hypoxic brain injury), all of which occurred on 2/7/2013. Her general cognitive abilities as assessed by the RBANS were in the severely impaired range, with moderately impaired language (due to reduced verbal fluency); and severely impaired immediate verbal memory, visuospatial/constructional abilities, attention, and delayed memory. Further assessment of specific cognitive abilities revealed deficits in temporal orientation, naming, verbal fluency, and facial recognition. " "Constructional ability was unimpaired. Additional memory assessment revealed severely impaired immediate and delayed memory. Personality test data suggested the presence of severe depression and moderate anxiety. Neuropsychological testing reveals moderate to severe impairment in memory, attention, temporal orientation, visuospatial/constructional abilities, facial recognition, and verbal fluency due to hypoxic brain injury and stroke. Naming was variable with performances in the average and moderately impaired range. She will follow up with Juan Carlos Childers DNP in Psychiatry for depression and anxiety."    Precautions:  Memory issues, pacemaker, defibrillator   Prior Therapy:  Speech therapy at [facility] in 2013 for a few sessions.  Pain:  8/10 left shoulder pain  Nutrition:  Regular consistency and thin liquids  Environmental Concerns/Cultural/Spiritual/Developmental/Educational Needs: None   Prior Level of Function: Independent  Social History:  Mrs. Chawla lives at home with her  and daughter. 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 She does not drive any longer.   Signs of Abuse: No  Functional Deficits Leading to Referral/Nature of Injury:  Cognitive deficits  Patient Therapy Goals:  "Try to get this memory situation together."    Objective     Scales of Cognitive and Communicative Ability for Neurorehabilitation (SCCAN) was administered to assess cognitive and communicative functioning.        Raw Scores  Percentage Score  Oral Expression (OE)  18/19   100  Orientation (OR)   12/12   100  Memory (ME)    14/19   74  Speech Comprehension (SP)  13/13   100  Reading Comprehension (RD)  11/12   92  Writing (WR)    6/7   86  Attention (AT)    13/16   81  Problem Solving (PS)   17/23   74    Total Raw Score  %ile Rank  SCCAN Index  Degree of Severity  82   8   70   Mild      Auditory Comprehension: Within functional limits.    Reading " Comprehension: Mildly impaired. Mrs. Chawla stated that she has difficulty concentrating and loses interest in what she is reading.     Verbal Expression: Within functional limits.     Written Expression: Mildly impaired mostly with spelling errors. Writing may be at baseline and will be further assessed.     Cognition: Short term memory (immediate and delayed) were impaired. Attention and concentration were impaired for more complex tasks. Her clock drawing was inaccurate for drawn hands and time indicated. Problem solving, sequencing, organization, and reasoning were impaired for moderate to complex tasks. She listed 6 to 13 items in simple categories which was below average. Further assessment of complex cognition will be completed. Her family takes care of the finances for the home.      Motor Speech/Fluency/Voice: Informal oral motor exam revealed no gross weakness. No dysarthria was present. Voice and fluency were within functional limits.     Swallowing: No concerns were reported.    Hearing: Appeared to be within functional limits.     Assessment/Impressions  Mrs. 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 Her deficits were characterized by decreased short term memory, decreased attention/concentration, decreased moderate to complex problem-solving, reasoning, sequencing and organization. Auditory comprehension, verbal expression, voice, and fluency were within functional limits. She will benefit from outpatient neurological rehabilitation speech therapy. A repeat neuropsychological evaluation may be beneficial since the last one was in 2013.     Functional Communication Measure (FCM):   Severity Modifier for Medicare G-Code:  *Memory  Current status: FCM:  Level 5   - CJ  at least 20% < 40% impaired, limited or restricted  Projected status:  FCM:  Level 6   - CI  at least 1% but less than 20% impaired, limited or  restricted    Problem Solving  Current status: FCM:  Level 5   - CJ  at least 20% < 40% impaired, limited or restricted  Projected status:  FCM:  Level 6   - CI  at least 1% but less than 20% impaired, limited or restricted      Rehab Potential: good to fair (based on time of onset)    Short Term Goals (6 weeks):   1. Mrs. Chawla will complete short term memory tasks with 90% accuracy using compensatory strategies to increase memory.   2. Mrs. Chawla will complete moderate to complex problem solving, reasoning, mental manipulation, sequencing and organization tasks with 90% accuracy to increase cognition.   3. Mrs. Chawla will list 15 to 20 items in categories within one minute to improve word fluency and thought organization.     Long Term Goals (8 weeks):   1. Mrs. Chawla will increase her cognitive-linguistic skills using compensatory strategies to improve functional independence.   2.1. Mrs. Chawla will demonstrate understanding and use of compensatory strategies to improve functional independence.     Education:  Mrs. Chawla was educated on the plan of care and recommendations. She verbalized understanding and agreed with the plan of care.     Plan  Certification Period: 01/04/17 to 12/31/17  Plan of Care Certification Period: 01/16/17 to 03/17/17    Recommended Treatment Plan:  Mrs. Chawla will   participate in the [facility] rehabilitation program for speech therapy 2 times per week to address her deficits.    Other Recommendations: Neuropsychological evaluation    ELIZABETH Gramajo, CCC-SLP  Speech-Language Pathologist  Outpatient Neurological Rehabilitation    Date: 01/16/2017    I certify the need for these services furnished under this plan of treatment and while under my care.  ____________________________________ Physician/Referring Practitioner   Date of Signature

## 2017-02-01 ENCOUNTER — TELEPHONE (OUTPATIENT)
Dept: CARDIOLOGY | Facility: CLINIC | Age: 52
End: 2017-02-01

## 2017-02-01 ENCOUNTER — CLINICAL SUPPORT (OUTPATIENT)
Dept: REHABILITATION | Facility: HOSPITAL | Age: 52
End: 2017-02-01
Attending: PSYCHIATRY & NEUROLOGY
Payer: MEDICARE

## 2017-02-01 DIAGNOSIS — S06.9X0S COGNITIVE DEFICIT AS LATE EFFECT OF TRAUMATIC BRAIN INJURY: ICD-10-CM

## 2017-02-01 DIAGNOSIS — Z78.9 IMPAIRED MOBILITY AND ADLS: ICD-10-CM

## 2017-02-01 DIAGNOSIS — Z74.09 IMPAIRED MOBILITY AND ADLS: ICD-10-CM

## 2017-02-01 DIAGNOSIS — R29.898 DECREASED ROM OF NECK: Chronic | ICD-10-CM

## 2017-02-01 DIAGNOSIS — F06.8 COGNITIVE DEFICIT AS LATE EFFECT OF TRAUMATIC BRAIN INJURY: ICD-10-CM

## 2017-02-01 DIAGNOSIS — G43.711 CHRONIC MIGRAINE WITHOUT AURA, WITH INTRACTABLE MIGRAINE, SO STATED, WITH STATUS MIGRAINOSUS: ICD-10-CM

## 2017-02-01 DIAGNOSIS — M25.571 RIGHT ANKLE PAIN, UNSPECIFIED CHRONICITY: ICD-10-CM

## 2017-02-01 PROCEDURE — 97140 MANUAL THERAPY 1/> REGIONS: CPT | Mod: PO

## 2017-02-01 NOTE — TELEPHONE ENCOUNTER
----- Message from Cesilia Garrett sent at 2/1/2017 12:28 PM CST -----  Contact: pt called  Pt called, states she is returning your call and will like to be reached at ph [phone]. Thank you

## 2017-02-01 NOTE — PROGRESS NOTES
"                                                      Physical Therapy Progress Note     Name: Amna Chawla  Clinic Number: [id number]  Diagnosis:   Encounter Diagnoses   Name Primary?   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         Subjective   Pt reports: Painful neck, bad headache   Pain Scale:  Neck pain 8/10 pre; headache 10/10.  Following interventions neck pain 5/10 and headache abolished    Objective     Patient received Amna received therapeutic exercises to develop strength, endurance, ROM, flexibility, posture and core stabilization for 5 minutes including:  therapy with activities as follows:     Recumbent bike: 5 minutes    Supine: Shoulder flexion with dowel 2 x 10 to 70 degrees NP               Chess press with dowel 2 x 10 NP               Chin tucks 2  x10 NP  Seated: UT stretch 3 x 30"               Levator stretch 3  X 30"                  Cervical rotation x 5                Lateral flexion x 5       MH to B cervical spine supine 10 minutes    Manual Therapy 40 minutes MFR/STM B UT, Cervical paraspinals, Scap mobilization, SCM, Occipital release.  Acupressure Points: TW 23, GB14    Written Home Exercises: Provided   Pt demo good understanding of the education provided. Amna demonstrated good return demonstration of activities.     Education provided re: POC, " HEP  No spiritual or educational barriers to learning provided    Pt has no cultural, educational or language barriers to learning provided.    Assessment   Tolerated treatment well. Very TTP Left UT and anterior cervical region. Pain reduced and headache abolished following interventions. This is a 51 y.o. female referred to outpatient physical therapy and presents with a medical diagnosis of cervicogenic HA, occipital neuralgia, and anoxic brain injury and demonstrates limitations as described in the problem list. Pt experiences unpredictable severe HA causing ER visits. Pt also has reports recent onset of left shoulder pain, history of carpal tunnel surgery, decreased memory, and pt must rely on public transportation. Pt has a history of ~ 2 falls/ month. Due to pt's deficits, pt is not able to work, visits ER frequently due to pain, has frequent falls, and is not able to live independently or care for young child without assistance. PT is warranted to address impairments to decrease frequency and severity of HA to improve consistency with daily functioning. Balance will also be addressed as needed. Pt's condition is unstable due to pt's report of frequent fluctuations in pain and mobility. Pt's average level of impairment due to HA is ~53%, pt is anticipated to improve impairment level to 26% by d/c.     Pt rehab potential is Good. Pt will benefit from continuing skilled outpatient physical therapy to address the deficits listed below in the problem list, provide pt/family education and to maximize pt's level of independence in the home and community environment.         Medical necessity is demonstrated by the following IMPAIRMENTS/PROMBLEM LIST:   1. Fall Risk - impaired balance   2. Weakness   3. Impaired muscle tone  4. Decreased ROM  5. Gait deviations   6. Impaired ambulation   7. Decreased activity tolerance   8. Difficulty participating in daily activities   9. Continued inability to participate in  vocational pursuits   10. Requires skilled supervision to complete and progress HEP      Anticipated barriers to physical therapy: history of HA since injury     Pt's spiritual, cultural and educational needs considered and pt agreeable to plan of care and goals as stated below:      GOALS:   Short term goals: 6 weeks, pt agrees to goals set.  1. Pt will perform HEP for UE/ scapular strengthening and cervical ROM/ stability with supervision to improve carryover of progress and decrease pain.  2. Pt will demonstrate improved cervical flex/ ext ROM by 10 degrees to demonstrate improved ROM to improve posture.  3. Pt will demonstrate improved cervical rotation to 50 degrees to decrease pain.   4. Pt will report decreased impairment per Oswestry by scoring impairment at 40% or less.  5. Pt will demonstrate improved right shoulder AROM for flexion to 150 degrees to be WFLs.  6. Pt will report decreased left shoulder pain to 5/10 with AROM to improve mobility and demonstrate decreased pain.  7. Assess FGA and set goals as needed for balance.     Long term goals: 12 weeks, pt agrees to goals set  8. Pt will report decreased HA to 3x/ week or less with no ER visits due to pain x 4 weeks.  9. Pt will demonstrate improved cervical flex/ ext AROM to 60 degrees to demonstrate normal/ functional ROM and to decrease pain.  10. Pt will demonstrate improved cervical rotation to 65 degrees to demonstrate improved mobility and decrease HA.  11. Pt will report decreased impairment per Oswestry by scoring impairment at 25% or less.  12. Pt will report decreased impact of pain on daily functioning by scoring 25% or less on Pain Disability Index.  13. Pt will demonstrate right shoulder AROM for abd to 130 degrees to be WFLs.             Plan   Continue PT 2-3 x weekly under established Plan of Care, with treatment to include: pt education, HEP, therapeutic exercises, neuromuscular re-education/balance exercises, therapeutic activities,  joint mobilizations, and modalities PRN, to work towards established goals. Pt may be seen by PTA to carry out plan of care.     Alexander Benjamin, PTA   02/01/2017

## 2017-02-01 NOTE — TELEPHONE ENCOUNTER
Patient informed that no labs are ordered at this time and she will be assessed at next appointment for labs that maybe needed.

## 2017-02-01 NOTE — TELEPHONE ENCOUNTER
----- Message from Cesilia Garrett sent at 1/31/2017  4:04 PM CST -----  Contact: pt called  Pt called, requesting her cholesterol checked. She states she was informed it would be done. Ph [phone]. Thank you

## 2017-02-02 ENCOUNTER — TELEPHONE (OUTPATIENT)
Dept: ORTHOPEDICS | Facility: CLINIC | Age: 52
End: 2017-02-02

## 2017-02-03 ENCOUNTER — ANESTHESIA (OUTPATIENT)
Dept: SURGERY | Facility: OTHER | Age: 52
End: 2017-02-03
Payer: MEDICARE

## 2017-02-03 ENCOUNTER — HOSPITAL ENCOUNTER (OUTPATIENT)
Facility: OTHER | Age: 52
Discharge: HOME | End: 2017-02-03
Attending: PLASTIC SURGERY | Admitting: PLASTIC SURGERY
Payer: MEDICARE

## 2017-02-03 ENCOUNTER — ANESTHESIA EVENT (OUTPATIENT)
Dept: SURGERY | Facility: OTHER | Age: 52
End: 2017-02-03
Payer: MEDICARE

## 2017-02-03 VITALS
HEART RATE: 61 BPM | HEIGHT: 64 IN | SYSTOLIC BLOOD PRESSURE: 143 MMHG | WEIGHT: 274 LBS | DIASTOLIC BLOOD PRESSURE: 80 MMHG | BODY MASS INDEX: 46.78 KG/M2 | TEMPERATURE: 98 F | RESPIRATION RATE: 16 BRPM | OXYGEN SATURATION: 100 %

## 2017-02-03 DIAGNOSIS — G56.02 LEFT CARPAL TUNNEL SYNDROME: Primary | ICD-10-CM

## 2017-02-03 PROCEDURE — 37000008 HC ANESTHESIA 1ST 15 MINUTES: Performed by: PLASTIC SURGERY

## 2017-02-03 PROCEDURE — 63600175 PHARM REV CODE 636 W HCPCS: Performed by: PLASTIC SURGERY

## 2017-02-03 PROCEDURE — 37000009 HC ANESTHESIA EA ADD 15 MINS: Performed by: PLASTIC SURGERY

## 2017-02-03 PROCEDURE — 71000016 HC POSTOP RECOV ADDL HR: Performed by: PLASTIC SURGERY

## 2017-02-03 PROCEDURE — 36000707: Performed by: PLASTIC SURGERY

## 2017-02-03 PROCEDURE — 25000003 PHARM REV CODE 250: Performed by: NURSE ANESTHETIST, CERTIFIED REGISTERED

## 2017-02-03 PROCEDURE — 90686 IIV4 VACC NO PRSV 0.5 ML IM: CPT | Performed by: PLASTIC SURGERY

## 2017-02-03 PROCEDURE — 63600175 PHARM REV CODE 636 W HCPCS: Performed by: NURSE ANESTHETIST, CERTIFIED REGISTERED

## 2017-02-03 PROCEDURE — 36000706: Performed by: PLASTIC SURGERY

## 2017-02-03 PROCEDURE — 71000015 HC POSTOP RECOV 1ST HR: Performed by: PLASTIC SURGERY

## 2017-02-03 PROCEDURE — 64721 CARPAL TUNNEL SURGERY: CPT | Mod: 79,LT,, | Performed by: PLASTIC SURGERY

## 2017-02-03 PROCEDURE — G0008 ADMIN INFLUENZA VIRUS VAC: HCPCS | Performed by: PLASTIC SURGERY

## 2017-02-03 PROCEDURE — 90471 IMMUNIZATION ADMIN: CPT | Performed by: PLASTIC SURGERY

## 2017-02-03 PROCEDURE — 25000003 PHARM REV CODE 250: Performed by: PLASTIC SURGERY

## 2017-02-03 PROCEDURE — 25000003 PHARM REV CODE 250: Performed by: ANESTHESIOLOGY

## 2017-02-03 PROCEDURE — 63600175 PHARM REV CODE 636 W HCPCS: Performed by: ANESTHESIOLOGY

## 2017-02-03 RX ORDER — MEPERIDINE HYDROCHLORIDE 50 MG/ML
12.5 INJECTION INTRAMUSCULAR; INTRAVENOUS; SUBCUTANEOUS ONCE AS NEEDED
Status: DISCONTINUED | OUTPATIENT
Start: 2017-02-03 | End: 2017-02-03 | Stop reason: HOSPADM

## 2017-02-03 RX ORDER — LIDOCAINE HCL/PF 100 MG/5ML
SYRINGE (ML) INTRAVENOUS
Status: DISCONTINUED | OUTPATIENT
Start: 2017-02-03 | End: 2017-02-03

## 2017-02-03 RX ORDER — FENTANYL CITRATE 50 UG/ML
25 INJECTION, SOLUTION INTRAMUSCULAR; INTRAVENOUS EVERY 5 MIN PRN
Status: DISCONTINUED | OUTPATIENT
Start: 2017-02-03 | End: 2017-02-03 | Stop reason: HOSPADM

## 2017-02-03 RX ORDER — MIDAZOLAM HYDROCHLORIDE 1 MG/ML
INJECTION INTRAMUSCULAR; INTRAVENOUS
Status: DISCONTINUED | OUTPATIENT
Start: 2017-02-03 | End: 2017-02-03

## 2017-02-03 RX ORDER — HYDROCODONE BITARTRATE AND ACETAMINOPHEN 5; 325 MG/1; MG/1
1 TABLET ORAL EVERY 6 HOURS PRN
Qty: 30 TABLET | Refills: 0 | Status: SHIPPED | OUTPATIENT
Start: 2017-02-03 | End: 2017-02-10

## 2017-02-03 RX ORDER — HYDROMORPHONE HYDROCHLORIDE 2 MG/ML
0.4 INJECTION, SOLUTION INTRAMUSCULAR; INTRAVENOUS; SUBCUTANEOUS EVERY 5 MIN PRN
Status: DISCONTINUED | OUTPATIENT
Start: 2017-02-03 | End: 2017-02-03 | Stop reason: HOSPADM

## 2017-02-03 RX ORDER — OXYCODONE HYDROCHLORIDE 5 MG/1
5 TABLET ORAL
Status: DISCONTINUED | OUTPATIENT
Start: 2017-02-03 | End: 2017-02-03 | Stop reason: HOSPADM

## 2017-02-03 RX ORDER — CEFAZOLIN SODIUM 2 G/50ML
2 SOLUTION INTRAVENOUS
Status: DISCONTINUED | OUTPATIENT
Start: 2017-02-03 | End: 2017-02-03 | Stop reason: HOSPADM

## 2017-02-03 RX ORDER — BUPIVACAINE HYDROCHLORIDE 2.5 MG/ML
INJECTION, SOLUTION EPIDURAL; INFILTRATION; INTRACAUDAL
Status: DISCONTINUED | OUTPATIENT
Start: 2017-02-03 | End: 2017-02-03 | Stop reason: HOSPADM

## 2017-02-03 RX ORDER — SODIUM CHLORIDE 0.9 % (FLUSH) 0.9 %
3 SYRINGE (ML) INJECTION
Status: DISCONTINUED | OUTPATIENT
Start: 2017-02-03 | End: 2017-02-03 | Stop reason: HOSPADM

## 2017-02-03 RX ORDER — SODIUM CHLORIDE, SODIUM LACTATE, POTASSIUM CHLORIDE, CALCIUM CHLORIDE 600; 310; 30; 20 MG/100ML; MG/100ML; MG/100ML; MG/100ML
INJECTION, SOLUTION INTRAVENOUS CONTINUOUS PRN
Status: DISCONTINUED | OUTPATIENT
Start: 2017-02-03 | End: 2017-02-03

## 2017-02-03 RX ORDER — SODIUM CHLORIDE 9 MG/ML
INJECTION, SOLUTION INTRAVENOUS CONTINUOUS
Status: DISCONTINUED | OUTPATIENT
Start: 2017-02-03 | End: 2017-02-03 | Stop reason: HOSPADM

## 2017-02-03 RX ORDER — LIDOCAINE HYDROCHLORIDE AND EPINEPHRINE 10; 10 MG/ML; UG/ML
INJECTION, SOLUTION INFILTRATION; PERINEURAL
Status: DISCONTINUED | OUTPATIENT
Start: 2017-02-03 | End: 2017-02-03 | Stop reason: HOSPADM

## 2017-02-03 RX ORDER — HYDROCODONE BITARTRATE AND ACETAMINOPHEN 5; 325 MG/1; MG/1
1 TABLET ORAL EVERY 4 HOURS PRN
Status: DISCONTINUED | OUTPATIENT
Start: 2017-02-03 | End: 2017-02-03 | Stop reason: HOSPADM

## 2017-02-03 RX ORDER — PROPOFOL 10 MG/ML
VIAL (ML) INTRAVENOUS CONTINUOUS PRN
Status: DISCONTINUED | OUTPATIENT
Start: 2017-02-03 | End: 2017-02-03

## 2017-02-03 RX ORDER — ONDANSETRON 2 MG/ML
4 INJECTION INTRAMUSCULAR; INTRAVENOUS ONCE AS NEEDED
Status: DISCONTINUED | OUTPATIENT
Start: 2017-02-03 | End: 2017-02-03 | Stop reason: HOSPADM

## 2017-02-03 RX ORDER — FENTANYL CITRATE 50 UG/ML
INJECTION, SOLUTION INTRAMUSCULAR; INTRAVENOUS
Status: DISCONTINUED | OUTPATIENT
Start: 2017-02-03 | End: 2017-02-03

## 2017-02-03 RX ADMIN — MIDAZOLAM HYDROCHLORIDE 2 MG: 1 INJECTION, SOLUTION INTRAMUSCULAR; INTRAVENOUS at 08:02

## 2017-02-03 RX ADMIN — SODIUM CHLORIDE, SODIUM LACTATE, POTASSIUM CHLORIDE, AND CALCIUM CHLORIDE: 600; 310; 30; 20 INJECTION, SOLUTION INTRAVENOUS at 08:02

## 2017-02-03 RX ADMIN — INFLUENZA A VIRUS A/CALIFORNIA/7/2009 X-179A (H1N1) ANTIGEN (FORMALDEHYDE INACTIVATED), INFLUENZA A VIRUS A/HONG KONG/4801/2014 X-263B (H3N2) ANTIGEN (FORMALDEHYDE INACTIVATED), INFLUENZA B VIRUS B/PHUKET/3073/2013 ANTIGEN (FORMALDEHYDE INACTIVATED), AND INFLUENZA B VIRUS B/BRISBANE/60/2008 ANTIGEN (FORMALDEHYDE INACTIVATED) 0.5 ML: 15; 15; 15; 15 INJECTION, SUSPENSION INTRAMUSCULAR at 09:02

## 2017-02-03 RX ADMIN — FENTANYL CITRATE 100 MCG: 50 INJECTION, SOLUTION INTRAMUSCULAR; INTRAVENOUS at 08:02

## 2017-02-03 RX ADMIN — PROPOFOL 50 MCG/KG/MIN: 10 INJECTION, EMULSION INTRAVENOUS at 08:02

## 2017-02-03 RX ADMIN — LIDOCAINE HYDROCHLORIDE 50 MG: 20 INJECTION, SOLUTION INTRAVENOUS at 08:02

## 2017-02-03 NOTE — IP AVS SNAPSHOT
[facility] Location ([facility])  [address] [place] [id number]  Phone: [phone]           Patient Discharge Instructions     Our goal is to set you up for success. This packet includes information on your condition, medications, and your home care. It will help you to care for yourself so you don't get sicker and need to go back to the hospital.     Please ask your nurse if you have any questions.        There are many details to remember when preparing to leave the hospital. Here is what you will need to do:    1. Take your medicine. If you are prescribed medications, review your Medication List in the following pages. You may have new medications to  at the pharmacy and others that you'll need to stop taking. Review the instructions for how and when to take your medications. Talk with your doctor or nurses if you are unsure of what to do.     2. Go to your follow-up appointments. Specific follow-up information is listed in the following pages. Your may be contacted by a transition nurse or clinical provider about future appointments. Be sure we have all of the phone numbers to reach you, if needed. Please contact your provider's office if you are unable to make an appointment.     3. Watch for warning signs. Your doctor or nurse will give you detailed warning signs to watch for and when to call for assistance. These instructions may also include educational information about your condition. If you experience any of warning signs to your health, call your doctor.               ** Verify the list of medication(s) below is accurate and up to date. Carry this with you in case of emergency. If your medications have changed, please notify your healthcare provider.             Medication List      CHANGE how you take these medications        Additional Info                      * hydrocodone-acetaminophen 5-325mg 5-325 mg per tablet   Commonly known as:  NORCO   Quantity:  30 tablet    Refills:  0   Dose:  1 tablet   What changed:  Another medication with the same name was added. Make sure you understand how and when to take each.    Instructions:  Take 1 tablet by mouth every 6 (six) hours as needed for Pain.     Begin Date    AM    Noon    PM    Bedtime       * hydrocodone-acetaminophen 5-325mg 5-325 mg per tablet   Commonly known as:  NORCO   Quantity:  30 tablet   Refills:  0   Dose:  1 tablet   What changed:  You were already taking a medication with the same name, and this prescription was added. Make sure you understand how and when to take each.    Instructions:  Take 1 tablet by mouth every 6 (six) hours as needed for Pain.     Begin Date    AM    Noon    PM    Bedtime       * Notice:  This list has 2 medication(s) that are the same as other medications prescribed for you. Read the directions carefully, and ask your doctor or other care provider to review them with you.      CONTINUE taking these medications        Additional Info                      aripiprazole 5 MG Tab   Commonly known as:  ABILIFY   Quantity:  90 tablet   Refills:  5   Comments:  **Patient requests 90 days supply**    Instructions:  TAKE 1 TABLET(5 MG) BY MOUTH EVERY DAY     Begin Date    AM    Noon    PM    Bedtime       atorvastatin 40 MG tablet   Commonly known as:  LIPITOR   Quantity:  30 tablet   Refills:  0    Instructions:  TAKE ONE TABLET BY MOUTH EVERY MORNING     Begin Date    AM    Noon    PM    Bedtime       butalbital-acetaminophen-caffeine -40 mg -40 mg per tablet   Commonly known as:  FIORICET, ESGIC   Refills:  0      Begin Date    AM    Noon    PM    Bedtime       carvedilol 25 MG tablet   Commonly known as:  COREG   Quantity:  180 tablet   Refills:  11   Comments:  **Patient requests 90 days supply**    Instructions:  TAKE 1 TABLET(25 MG) BY MOUTH TWICE DAILY WITH MEALS     Begin Date    AM    Noon    PM    Bedtime       chlorthalidone 25 MG Tab   Commonly known as:  HYGROTEN    Quantity:  90 tablet   Refills:  11   Comments:  **Patient requests 90 days supply**    Instructions:  TAKE 1 TABLET BY MOUTH EVERY DAY     Begin Date    AM    Noon    PM    Bedtime       desipramine 25 MG tablet   Commonly known as:  NORPRAMIN   Quantity:  30 tablet   Refills:  11   Dose:  25 mg    Instructions:  Take 1 tablet (25 mg total) by mouth once daily.     Begin Date    AM    Noon    PM    Bedtime       divalproex 250 MG EC tablet   Commonly known as:  DEPAKOTE   Refills:  11   Dose:  250 mg    Instructions:  Take 250 mg by mouth once daily.     Begin Date    AM    Noon    PM    Bedtime       donepezil 10 MG tablet   Commonly known as:  ARICEPT   Quantity:  180 tablet   Refills:  3   Dose:  10 mg    Instructions:  Take 1 tablet (10 mg total) by mouth 2 (two) times daily.     Begin Date    AM    Noon    PM    Bedtime       duloxetine 60 MG capsule   Commonly known as:  CYMBALTA   Quantity:  180 capsule   Refills:  1   Dose:  60 mg   Comments:  **Patient requests 90 days supply**    Instructions:  Take 1 capsule (60 mg total) by mouth 2 (two) times daily.     Begin Date    AM    Noon    PM    Bedtime       gabapentin 300 MG capsule   Commonly known as:  NEURONTIN   Quantity:  810 capsule   Refills:  12   Dose:  900 mg   Comments:  **Patient requests 90 days supply**    Instructions:  Take 3 capsules (900 mg total) by mouth 3 (three) times daily.     Begin Date    AM    Noon    PM    Bedtime       lamotrigine 25 MG tablet   Commonly known as:  LAMICTAL   Quantity:  180 tablet   Refills:  11   Dose:  150 mg    Instructions:  Take 6 tablets (150 mg total) by mouth once daily.     Begin Date    AM    Noon    PM    Bedtime       lorazepam 0.5 MG tablet   Commonly known as:  ATIVAN   Quantity:  30 tablet   Refills:  1   Dose:  0.5 mg    Instructions:  Take 1 tablet (0.5 mg total) by mouth every 6 (six) hours as needed for Anxiety.     Begin Date    AM    Noon    PM    Bedtime       magnesium 200 mg Tab    Refills:  0   Dose:  200 mg    Instructions:  Take 200 mg by mouth once daily.     Begin Date    AM    Noon    PM    Bedtime       mefenamic acid 250 mg Cap   Quantity:  60 each   Refills:  8   Comments:  **Patient requests 90 days supply**    Instructions:  TAKE 2 CAPSULES BY MOUTH TWICE DAILY AS NEEDED.     Begin Date    AM    Noon    PM    Bedtime       pantoprazole 40 MG tablet   Commonly known as:  PROTONIX   Quantity:  30 tablet   Refills:  11   Dose:  40 mg    Instructions:  Take 1 tablet (40 mg total) by mouth once daily.     Begin Date    AM    Noon    PM    Bedtime       tiagabine 4 MG tablet   Commonly known as:  GABITRIL   Quantity:  40 tablet   Refills:  11   Dose:  4 mg    Instructions:  Take 1 tablet (4 mg total) by mouth 2 (two) times daily as needed (headache).     Begin Date    AM    Noon    PM    Bedtime       tizanidine 4 MG tablet   Commonly known as:  ZANAFLEX   Refills:  0   Dose:  4 mg    Instructions:  Take 4 mg by mouth every 6 (six) hours as needed.     Begin Date    AM    Noon    PM    Bedtime       topiramate 100 MG tablet   Commonly known as:  TOPAMAX   Quantity:  90 tablet   Refills:  12   Dose:  150 mg    Instructions:  Take 1.5 tablets (150 mg total) by mouth 2 (two) times daily.     Begin Date    AM    Noon    PM    Bedtime       VITAMIN D2 50,000 unit Cap   Quantity:  6 capsule   Refills:  0   Generic drug:  ergocalciferol    Instructions:  TAKE 1 CAPSULE BY MOUTH EVERY 7 DAYS     Begin Date    AM    Noon    PM    Bedtime       zolpidem 10 mg Tab   Commonly known as:  AMBIEN   Quantity:  30 tablet   Refills:  5   Dose:  10 mg    Instructions:  Take 1 tablet (10 mg total) by mouth nightly as needed.     Begin Date    AM    Noon    PM    Bedtime            Where to Get Your Medications      You can get these medications from any pharmacy     Bring a paper prescription for each of these medications     hydrocodone-acetaminophen 5-325mg 5-325 mg per tablet                  Please  bring to all follow up appointments:    1. A copy of your discharge instructions.  2. All medicines you are currently taking in their original bottles.  3. Identification and insurance card.    Please arrive 15 minutes ahead of scheduled appointment time.    Please call 24 hours in advance if you must reschedule your appointment and/or time.        Your Scheduled Appointments     Feb 07, 2017  9:00 AM CST   Established Physical Therapy with Mariel Miguel, PT   [facility]    [address] [facility] [id number] [phone]            Feb 09, 2017  9:00 AM CST   Established Physical Therapy with Mariel Miguel, PT   [facility]    [address] [facility] [id number] [phone]            Feb 09, 2017 11:15 AM CST   Speech Therapy with ELIZABETH Vilchis, CCC-SLP   [facility]    [address] [facility] [id number] [phone]            Feb 10, 2017 10:45 AM CST   Post OP with Matt Pugh Jr., MD   [facility])    [address] [place] [address] [phone]            Feb 13, 2017  9:00 AM CST   Established Physical Therapy with Mariel Miguel, PT   [facility]    [address] [facility] [id number] [phone]              Follow-up Information     Follow up with Matt Pugh Jr, MD In 1 week.    Specialties:  Plastic Surgery, Hand Surgery    Why:  For wound re-check    Contact information:    [facility] [address] [phone]          Discharge Instructions     Future Orders    Call MD for:  difficulty breathing, headache or visual disturbances     Call MD for:  extreme fatigue     Call MD for:  hives     Call MD for:  persistent dizziness or light-headedness     Call MD for:  persistent nausea and vomiting     Call MD for:   "redness, tenderness, or signs of infection (pain, swelling, redness, odor or green/yellow discharge around incision site)     Call MD for:  severe uncontrolled pain     Call MD for:  temperature >100.4     Diet general     Questions:    Total calories:      Fat restriction, if any:      Protein restriction, if any:      Na restriction, if any:      Fluid restriction:      Additional restrictions:      Leave dressing on - Keep it clean, dry, and intact until clinic visit     Lifting restrictions     No driving, operating heavy equipment or signing legal documents while taking pain medication.         Discharge Instructions       Keep sling on   Elevate extremity  Keep dressing clean, dry and intact    Anesthesia: Monitored Anesthesia Care (MAC)    Anesthesia Safety  · Have an adult family member or friend drive you home after the procedure.  · For the first 24 hours after your surgery:  ¨ Do not drive or use heavy equipment.  ¨ Do not make important decisions or sign documents.  ¨ Avoid alcohol.  ¨ Have someone stay with you, if possible. They can watch for problems and help keep you safe.          Admission Information     Date & Time Provider Department CSN    2/3/2017  6:53 AM Matt Pugh Jr., MD [facility] [id number]      Care Providers     Provider Role Specialty Primary office phone    Matt Pugh Jr., MD Attending Provider Plastic Surgery [phone]    Matt Pugh Jr., MD Surgeon  Plastic Surgery [phone]      Your Vitals Were     BP Pulse Temp Resp Height Weight    163/95 (BP Location: Right arm, Patient Position: Lying, BP Method: Automatic) 59 98.2 °F (36.8 °C) (Oral) 16 5' 4" (1.626 m) 124.3 kg (274 lb)    Last Period SpO2 BMI          01/03/2016 93% 47.03 kg/m2        Recent Lab Values        2/8/2013 2/2/2014                        2:57 AM  9:26 PM          A1C 5.9 6.0                     Allergies as of 2/3/2017        Reactions    Aspirin Hives    Imitrex " [Sumatriptan] Palpitations    Nsaids (Non-steroidal Anti-inflammatory Drug) Shortness Of Breath    Penicillins Hives, Swelling    Shellfish Containing Products Anaphylaxis    seafood    Percocet [Oxycodone-acetaminophen] Itching    States can take    Reglan [Metoclopramide Hcl] Other (See Comments)    Parkinsonism       [facility]     [facility] Nurse Care Line - 24/7 Assistance  Unless otherwise directed by your provider, please contact [facility], our nurse care line that is available for 24/7 assistance.     Registered nurses in the [facility] provide clinical advisement, health education, appointment booking, and other advisory services.  Call for this free service at [phone].        Advance Directives     An advance directive is a document which, in the event you are no longer able to make decisions for yourself, tells your healthcare team what kind of treatment you do or do not want to receive, or who you would like to make those decisions for you.  If you do not currently have an advance directive, [facility] encourages you to create one.  For more information call:  [phone]), [phone] ([phone]),  or log on to [email or web address].        Language Assistance Services     ATTENTION: Language assistance services are available, free of charge. Please call [phone].      ATENCIÓN: Si habla español, tiene a turcios disposición servicios gratuitos de asistencia lingüística. Llame al [phone].     CHÚ Ý: N?u b?n nói Ti?ng Vi?t, có các d?ch v? h? tr? ngôn ng? mi?n phí dành cho b?n. G?i s? [phone].        Pneumonmia Discharge Instructions                 [facility] complies with applicable Federal civil rights laws and does not discriminate on the basis of race, color, national origin, age, disability, or sex.

## 2017-02-03 NOTE — ANESTHESIA POSTPROCEDURE EVALUATION
"Anesthesia Post Evaluation    Patient: Amna Chawla    Procedure(s) Performed: Procedure(s) (LRB):  RELEASE-CARPAL TUNNEL (Left)    Final Anesthesia Type: general  Patient location during evaluation: [facility]  Patient participation: Yes- Able to Participate  Level of consciousness: awake and alert  Post-procedure vital signs: reviewed and stable  Pain management: adequate  PONV status at discharge: No PONV  Anesthetic complications: no      Cardiovascular status: blood pressure returned to baseline  Respiratory status: unassisted, spontaneous ventilation and room air  Hydration status: euvolemic  Follow-up not needed.        Visit Vitals    BP (!) 140/77 (BP Location: Left arm, Patient Position: Sitting, BP Method: Automatic)    Pulse 60    Temp 36.8 °C (98.2 °F) (Oral)    Resp 18    Ht 5' 4" (1.626 m)    Wt 124.3 kg (274 lb)    LMP 01/03/2016    SpO2 99%    Breastfeeding No    BMI 47.03 kg/m2       Pain/Kofi Score: Presence of Pain: denies (2/3/2017  7:52 AM)      "

## 2017-02-03 NOTE — ANESTHESIA PREPROCEDURE EVALUATION
02/03/2017  Amna Chawla is a 51 y.o., female.    [facility] Anesthesia Evaluation    I have reviewed the Patient Summary Reports.    I have reviewed the Nursing Notes.   I have reviewed the Medications.     Review of Systems  Anesthesia Hx:  No problems with previous Anesthesia  History of prior surgery of interest to airway management or planning: Previous anesthesia: General 12/16 [facility], propofol qqt with general anesthesia.  Denies Family Hx of Anesthesia complications.   Denies Personal Hx of Anesthesia complications.   Social:  Non-Smoker    Hematology/Oncology:  Hematology Normal   Oncology Normal     Cardiovascular:   Pacemaker ([employer] AICD) Hypertension, well controlled Denies CAD.   Dysrhythmias  Says no blockages or valvular heart conditions.  Only rhythmn  Disturbance.  MERCHANT at two blocks   Pulmonary:   Asthma asymptomatic Past hx of asthma but no attacks in years   Renal/:  Renal/ Normal     Hepatic/GI:   GERD, well controlled    Musculoskeletal:   botox injections for cervical spasms and occipital headache Spine Disorders: cervical and lumbar    Neurological:   CVA, residual symptoms Seizures Left sided weakness post CVA 2013/anoxic brain injury    Last seizure was in 2014, then only partial   Endocrine:  Endocrine Normal        Physical Exam  General:  Morbid Obesity    Airway/Jaw/Neck:  Airway Findings: Mouth Opening: Normal Tongue: Large  General Airway Assessment: Adult  Mallampati: II  TM Distance: Normal, at least 6 cm         Dental:  Dental Findings: In tact             Anesthesia Plan  Type of Anesthesia, risks & benefits discussed:  Anesthesia Type:  general  Patient's Preference:   Intra-op Monitoring Plan: standard ASA monitors  Intra-op Monitoring Plan Comments:   Post Op Pain Control Plan:   Post Op Pain Control Plan Comments:   Induction:    Beta Blocker:         Informed  Consent: Patient understands risks and agrees with Anesthesia plan.  Questions answered. Anesthesia consent signed with patient.  ASA Score: 3     Day of Surgery Review of History & Physical:    H&P update referred to the surgeon.     Anesthesia Plan Notes: IV propofol for local injection by surgeon  Surgeon to use bipolar cautery        Ready For Surgery From Anesthesia Perspective.

## 2017-02-03 NOTE — DISCHARGE INSTRUCTIONS
Keep sling on   Elevate extremity  Keep dressing clean, dry and intact    Anesthesia: Monitored Anesthesia Care (MAC)    Anesthesia Safety  · Have an adult family member or friend drive you home after the procedure.  · For the first 24 hours after your surgery:  ¨ Do not drive or use heavy equipment.  ¨ Do not make important decisions or sign documents.  ¨ Avoid alcohol.  ¨ Have someone stay with you, if possible. They can watch for problems and help keep you safe.

## 2017-02-03 NOTE — BRIEF OP NOTE
Operative Note       Surgery Date: 2/3/2017     Surgeon(s) and Role:     * Matt Pugh Jr., MD - Primary    Pre-op Diagnosis:  Left carpal tunnel syndrome [G56.02]    Post-op Diagnosis:  Carpal tunnel syndrome left      Procedure(s) (LRB):  RELEASE-CARPAL TUNNEL (Left)    Anesthesia: Local MAC    Findings/Key Components:  Tight transverse carpal ligament    Core Measure Documentation:  Were antibiotics extended? No  Was the patient administered a VTE Prophylaxis? No. Short procedure; low risk  Estimated Blood Loss: minimal           Specimens     None        Implants: * No implants in log *    Complications: none           Disposition: PACU - hemodynamically stable.           Condition: Stable    Attestation:  I was present for the entire procedure.

## 2017-02-03 NOTE — OR NURSING
"Patients states she had a cardiac arrest on 2/7/13. Patient states she was in the caf[employer]ia of [employer] when she dropped her phone and fell over as explained to her by her daughter. Patient states she was "down" for 10 minutes which caused an anoxic brain injury which she now has seizures because of. Anoxia also caused residual left upper and lower extremity weakness. Patient ambulates with a cane, states she may need a sling for left arm after the surgery. Patient has a [employer] defibrillator.  "

## 2017-02-03 NOTE — DISCHARGE SUMMARY
.Discharge Note  Short Stay      SUMMARY     Admit Date: 2/3/2017    Attending Physician: Matt Pugh Jr.,*     Discharge Physician: Matt Pugh Jr.,*    Final Diagnosis: left carpal tunnel release    Disposition: Home or Self Care    Patient Instructions:   Current Discharge Medication List      START taking these medications    Details   !! hydrocodone-acetaminophen 5-325mg (NORCO) 5-325 mg per tablet Take 1 tablet by mouth every 6 (six) hours as needed for Pain.  Qty: 30 tablet, Refills: 0       !! - Potential duplicate medications found. Please discuss with provider.      CONTINUE these medications which have NOT CHANGED    Details   aripiprazole (ABILIFY) 5 MG Tab TAKE 1 TABLET(5 MG) BY MOUTH EVERY DAY  Qty: 90 tablet, Refills: 5    Comments: **Patient requests 90 days supply**  Associated Diagnoses: Depression, major, recurrent, moderate      atorvastatin (LIPITOR) 40 MG tablet TAKE ONE TABLET BY MOUTH EVERY MORNING  Qty: 30 tablet, Refills: 0      butalbital-acetaminophen-caffeine -40 mg (FIORICET, ESGIC) -40 mg per tablet Refills: 0      carvedilol (COREG) 25 MG tablet TAKE 1 TABLET(25 MG) BY MOUTH TWICE DAILY WITH MEALS  Qty: 180 tablet, Refills: 11    Comments: **Patient requests 90 days supply**      chlorthalidone (HYGROTEN) 25 MG Tab TAKE 1 TABLET BY MOUTH EVERY DAY  Qty: 90 tablet, Refills: 11    Comments: **Patient requests 90 days supply**      desipramine (NORPRAMIN) 25 MG tablet Take 1 tablet (25 mg total) by mouth once daily.  Qty: 30 tablet, Refills: 11    Associated Diagnoses: Chronic migraine without aura, with intractable migraine, so stated, with status migrainosus      divalproex (DEPAKOTE) 250 MG EC tablet Take 250 mg by mouth once daily.   Refills: 11      donepezil (ARICEPT) 10 MG tablet Take 1 tablet (10 mg total) by mouth 2 (two) times daily.  Qty: 180 tablet, Refills: 3    Associated Diagnoses: Status migrainosus; Vertigo; Leg pain, left; Cognitive  impairment      duloxetine (CYMBALTA) 60 MG capsule Take 1 capsule (60 mg total) by mouth 2 (two) times daily.  Qty: 180 capsule, Refills: 1    Comments: **Patient requests 90 days supply**  Associated Diagnoses: Depression, major, recurrent, moderate      gabapentin (NEURONTIN) 300 MG capsule Take 3 capsules (900 mg total) by mouth 3 (three) times daily.  Qty: 810 capsule, Refills: 12    Comments: **Patient requests 90 days supply**  Associated Diagnoses: Chronic migraine without aura, with intractable migraine, so stated, with status migrainosus      lamotrigine (LAMICTAL) 25 MG tablet Take 6 tablets (150 mg total) by mouth once daily.  Qty: 180 tablet, Refills: 11    Associated Diagnoses: Chronic migraine without aura, with intractable migraine, so stated, with status migrainosus      magnesium 200 mg Tab Take 200 mg by mouth once daily.      mefenamic acid 250 mg Cap TAKE 2 CAPSULES BY MOUTH TWICE DAILY AS NEEDED.  Qty: 60 each, Refills: 8    Comments: **Patient requests 90 days supply**  Associated Diagnoses: TMJ (dislocation of temporomandibular joint), initial encounter      pantoprazole (PROTONIX) 40 MG tablet Take 1 tablet (40 mg total) by mouth once daily.  Qty: 30 tablet, Refills: 11    Associated Diagnoses: Gastroesophageal reflux disease without esophagitis      tiagabine (GABITRIL) 4 MG tablet Take 1 tablet (4 mg total) by mouth 2 (two) times daily as needed (headache).  Qty: 40 tablet, Refills: 11    Associated Diagnoses: Chronic migraine without aura, with intractable migraine, so stated, with status migrainosus      tizanidine (ZANAFLEX) 4 MG tablet Take 4 mg by mouth every 6 (six) hours as needed.      topiramate (TOPAMAX) 100 MG tablet Take 1.5 tablets (150 mg total) by mouth 2 (two) times daily.  Qty: 90 tablet, Refills: 12    Associated Diagnoses: Chronic migraine without aura, with intractable migraine, so stated, with status migrainosus      zolpidem (AMBIEN) 10 mg Tab Take 1 tablet (10 mg  total) by mouth nightly as needed.  Qty: 30 tablet, Refills: 5    Associated Diagnoses: Insomnia, unspecified type      !! hydrocodone-acetaminophen 5-325mg (NORCO) 5-325 mg per tablet Take 1 tablet by mouth every 6 (six) hours as needed for Pain.  Qty: 30 tablet, Refills: 0      lorazepam (ATIVAN) 0.5 MG tablet Take 1 tablet (0.5 mg total) by mouth every 6 (six) hours as needed for Anxiety.  Qty: 30 tablet, Refills: 1    Associated Diagnoses: Functional tremor; Functional gait disorder with tremor      VITAMIN D2 50,000 unit capsule TAKE 1 CAPSULE BY MOUTH EVERY 7 DAYS  Qty: 6 capsule, Refills: 0       !! - Potential duplicate medications found. Please discuss with provider.            Discharge Procedure Orders (must include Diet, Follow-up, Activity)  Diet general     Call MD for:  temperature >100.4     Call MD for:  persistent nausea and vomiting     Call MD for:  severe uncontrolled pain     Call MD for:  difficulty breathing, headache or visual disturbances     Call MD for:  redness, tenderness, or signs of infection (pain, swelling, redness, odor or green/yellow discharge around incision site)     Call MD for:  hives     Call MD for:  persistent dizziness or light-headedness     Call MD for:  extreme fatigue     Keep surgical extremity elevated     Lifting restrictions     No driving, operating heavy equipment or signing legal documents while taking pain medication.     Leave dressing on - Keep it clean, dry, and intact until clinic visit

## 2017-02-03 NOTE — OP NOTE
DATE OF PROCEDURE:  02/03/2017    PREOPERATIVE DIAGNOSIS:  Left carpal tunnel syndrome.    POSTOPERATIVE DIAGNOSIS:  Left carpal tunnel syndrome.    PROCEDURE:  Left open carpal tunnel release.    SURGEON:  Matt Pugh Jr., M.D.    ANESTHESIA:  Local with MAC.    FLUIDS:  150 mL of crystalloid.    ESTIMATED BLOOD LOSS:  Minimal.    FINDINGS:  Tight transverse retinacular ligament of the left volar wrist.    IMPLANTS AND GRAFTS:  None.    SPECIMENS:  None.    COMPLICATIONS:  None.    DISPOSITION:  To PACU, then home.    INDICATIONS:  Mrs. Chawla is a 51-year-old right-hand dominant female who   underwent a right open carpal tunnel release approximately 2 months ago with   bilateral carpal tunnel.  The patient was looking forward to having her left   carpal tunnel released.  I discussed the risks, benefits and alternatives with   the patient in detail.  After discussing this with the patient, she wished to   proceed with the procedure.  Informed consent was obtained and the patient was   then scheduled.    PROCEDURE IN DETAIL:  After proper identification of Mrs. Chawla in the   preoperative area, the patient was wheeled to the Operating Room on a hospital   stretcher.  Pressure points were padded and checked this time.  A timeout was   called when surgeons, nurses, Anesthesia agreed upon the patient and procedure.    She was then induced under MAC sedation and 10 mL of 1% lidocaine with   epinephrine were injected into the left carpal tunnel and palmar subcutaneous   tissue.  A padded 18-inch tourniquet was placed to her left forearm and the   upper extremity was then prepped and draped in usual sterile fashion.  Next,   using an Esmarch tourniquet, the tourniquet was then used to exsanguinate the   hand and the tourniquet was inflated to 250 mmHg.  A longitudinal incision was   created using a #15 blade scalpel from distal to proximal direction, exposing   the palmaris fascia.  Fascia was then divided in a  distal to proximal direction   using a #15 blade scalpel exposing the transverse retinacular ligament.  Next,   the ligament was then divided.  It was noted there was insertion of the thenar   muscles across the midline of the palm.  I attempted to preserve as much of the   muscle fibers as possible, scraping them from their origin.  I continued through   the transverse retinacular ligament, releasing it without injuring the   underlying structures.  Next, an antebrachial fasciotomy was created from the   distal, proximal and ulnar direction for approximately 2 to 3 cm under direct   visualization.  Next, the nerve was then inspected.  It was in continuity and   uninjured.  I inspected the carpal tunnel for complete release and any masses or   dislocations and none were found.  Next, the tourniquet was then deflated.    Hemostasis was achieved using bipolar cautery.  Wound was irrigated with copious   amounts of normal saline and then closed using 4-0 nylon sutures in an   interrupted fashion.  The wound was cleaned and dried.  Xeroform was applied   followed by a volar sterile splint with the fingers free and the wrist neutral.    This concluded the procedure.  The patient tolerated the procedure well without   any complications.  At the end of the case, needle and sponge counts correct   x2, and I was present and scrubbed during all aspects of the procedure.  She was   then taken to PACU for further recovery.      RACHELLE  dd: 02/03/2017 09:05:00 (CST)  td: 02/03/2017 11:48:26 (CST)  Doc ID   #[id number]  Job ID #[id number]    CC:

## 2017-02-03 NOTE — H&P
Chief Complaint: No chief complaint on file.      HPI:    Amna Chawla is a right hand dominant 51 y.o. female presenting today for left carpal tunnel release, she denies any changes in medical history.    Past Medical History   Diagnosis Date    Asthma     Brain anoxic injury     Coronary artery disease     Defibrillator activation 2013    Depression     History of sudden cardiac arrest 2/2013     PEA arrest with subsequent long-QT    Hypertension     Pacemaker 2013    Seizures     Stroke      weakness lt side       Past Surgical History   Procedure Laterality Date    Insert / replace / remove pacemaker      Cardiac defibrillator placement      Breast surgery      Hiatal hernia repair      Breast reduction      Tubal ligation      Cardiac defibrillator placement      Hernia repair      Cosmetic surgery      Carpal tunnel release Right         Family History   Problem Relation Age of Onset    Hypertension Mother     COPD      Heart failure         Social History     Social History    Marital status: Single     Spouse name: N/A    Number of children: N/A    Years of education: N/A     Occupational History     [employer]     Social History Main Topics    Smoking status: Never Smoker    Smokeless tobacco: Never Used    Alcohol use No    Drug use: No    Sexual activity: No     Other Topics Concern    None     Social History Narrative       Review of patient's allergies indicates:   Allergen Reactions    Aspirin Hives    Imitrex [sumatriptan] Palpitations    Nsaids (non-steroidal anti-inflammatory drug) Shortness Of Breath    Penicillins Hives and Swelling    Shellfish containing products Anaphylaxis     seafood    Percocet [oxycodone-acetaminophen] Itching     States can take    Reglan [metoclopramide hcl] Other (See Comments)     Parkinsonism          Current Facility-Administered Medications:     cefazolin (ANCEF) 2 gram in dextrose 5% 50 mL IVPB (premix), 2 g,  "Intravenous, On Call Procedure, Matt Pugh Jr., MD        Review of Systems:  Constitutional: no fever or chills  ENT: no nasal congestion or sore throat  Respiratory: no cough or shortness of breath  Cardiovascular: no chest pain or palpitations  Gastrointestinal: no nausea or vomiting, PUD, GERD, NSAID intolerance  Genitourinary: no hematuria or dysuria  Integument/Breast: no rash or pruritis  Hematologic/Lymphatic: no easy bruising or lymphadenopathy  Musculoskeletal: see HPI  Neurological: no seizures or tremors  Behavioral/Psych: no auditory or visual hallucinations      Objective:      PHYSICAL EXAM:  Vitals:    02/02/17 1100   Weight: 124.3 kg (274 lb)   Height: 5' 4" (1.626 m)     General Appearance: WDWN, NAD  Neuro/Psych: Mood & affect appropriate  Lungs: Respirations equal and unlabored.   CV: No apparent CV dysfunction, distal pulses intact, RRR  Skin: Intact throughout  Extremities:   Left Hand Exam     Tenderness   The patient is experiencing no tenderness.         Range of Motion   The patient has normal left wrist ROM.    Muscle Strength   :  5/5     Tests   Phalens Sign: positive  Tinels Sign (Medial Nerve): positive    Other   Erythema: absent  Scars: absent  Sensation: normal  Pulse: present              Assessment:   Left carpal tunnel syndrome     Plan/Discussion:   To OR for left carpal tunnel release  Ancef 2g OCTOR           "

## 2017-02-06 ENCOUNTER — TELEPHONE (OUTPATIENT)
Dept: ORTHOPEDICS | Facility: CLINIC | Age: 52
End: 2017-02-06

## 2017-02-06 NOTE — TELEPHONE ENCOUNTER
----- Message from Silvia Kennedy sent at 2/6/2017  2:56 PM CST -----  Contact: pt   X_  1st Request  _  2nd Request  _  3rd Request    Who:PUSHPA KEY [[id number]]    Why: Patient states the pain medication prescribed is cause her to itch patient would like to be prescribed an alternate medication    What Number to Call Back: [phone]    When to Expect a call back: (Before the end of the day)   -- if call after 3:00 call back will be tomorrow.

## 2017-02-07 ENCOUNTER — CLINICAL SUPPORT (OUTPATIENT)
Dept: REHABILITATION | Facility: HOSPITAL | Age: 52
End: 2017-02-07
Attending: PSYCHIATRY & NEUROLOGY
Payer: MEDICARE

## 2017-02-07 DIAGNOSIS — R29.898 DECREASED ROM OF NECK: Primary | Chronic | ICD-10-CM

## 2017-02-07 DIAGNOSIS — Z74.09 IMPAIRED MOBILITY AND ADLS: ICD-10-CM

## 2017-02-07 DIAGNOSIS — G44.309 HEADACHES DUE TO OLD HEAD INJURY: Chronic | ICD-10-CM

## 2017-02-07 DIAGNOSIS — S09.90XS HEADACHES DUE TO OLD HEAD INJURY: Chronic | ICD-10-CM

## 2017-02-07 DIAGNOSIS — Z78.9 IMPAIRED MOBILITY AND ADLS: ICD-10-CM

## 2017-02-07 DIAGNOSIS — G43.711 CHRONIC MIGRAINE WITHOUT AURA, WITH INTRACTABLE MIGRAINE, SO STATED, WITH STATUS MIGRAINOSUS: ICD-10-CM

## 2017-02-07 PROCEDURE — 97140 MANUAL THERAPY 1/> REGIONS: CPT | Mod: PO | Performed by: PHYSICAL THERAPIST

## 2017-02-07 PROCEDURE — 97110 THERAPEUTIC EXERCISES: CPT | Mod: PO | Performed by: PHYSICAL THERAPIST

## 2017-02-07 NOTE — PROGRESS NOTES
"                                                      Physical Therapy Progress Note     Name: Amna Chawla  Clinic Number: [id number]  Diagnosis:   M54.81 (ICD-10-CM) - Bilateral occipital neuralgia   G93.1 (ICD-10-CM) - Anoxic brain injury   R51 (ICD-10-CM) - Cervicogenic headache     Physician: Missy Rock MD  Treatment Orders: PT Eval and Treat  Past Medical History   Diagnosis Date    Asthma     Brain anoxic injury     Coronary artery disease     Defibrillator activation 2013    Depression     History of sudden cardiac arrest 2/2013     PEA arrest with subsequent long-QT    Hypertension     Pacemaker 2013    Seizures     Stroke      weakness lt side       Precautions: standard  Visit #: 4  Date of Eval: 1/18/17  Plan of Care Expiration: 4/12/17    Functional Limitations Reports - G Codes  Category: other   Tool: cervical rotation ROM, Oswestry Disability Index, Pain Disability Index  Score: right= 46 degrees, left= 33 degrees, 50%, 55%    G codes 4/10    G code Mobility   eval CK-CJ                 Subjective   Pt reports: carpal tunnel surgery on LUE on Friday   Pain Scale:  Tightness left upper traps    Objective     Patient received Amna received therapeutic exercises x 24 to develop strength, endurance, ROM, flexibility, posture and core stabilization for 24 minutes including:  therapy with activities as follows:     Cervical rotation: right= 60 degrees-->75 degrees, left= 55 degrees-->63 degrees  Right shoulder flex and abd= 180 degrees  PROM: cervical rotation   Upper traps   pecs   scap depression    Chin tucks 2  x10 NP  Seated: UT stretch 3 x 30"               Levator stretch 3  X 30"                  Cervical rotation x 5   Standing lat pulls with blue tband 15x  Standing rows with blue tband 15x      MH to B cervical spine in sitting 10 minutes after session    Pt participated in Manual Therapy 21 minutes to address pain, ROM, and tightness  TTP left upper trap, levator scap, erector " spinae to mid traps  Occipital release.    Graston Technique in sitting: GT 2 sweeping to upper traps, cervical region, trigger point release  multiple locations of left upper trap and levator scap; sweeping/ fanning with GT 4 over left  upper traps and levator scap; GT3- filet and jstroke of erector spinae on left. Pin and glide/  pin and stretch left upper trap and levator scap with AROM  Manual muscle play of left upper traps with Biofreeze    Written Home Exercises: Provided   Pt demo good understanding of the education provided. Amna demonstrated good return demonstration of activities.     Education provided re: POC, HEP  No spiritual or educational barriers to learning provided    Pt has no cultural, educational or language barriers to learning provided.    Assessment   Amna tolerated treatment well. Pt denied headache upon arrival to PT, but reported left shoulder pain. Right shoulder is not bothering pt today. Pt demonstrated normal AROM of right shoulder in supine. Pt demonstrated decreased left shoulder flexion and abd PROM with muscle guarding noted. Pt grossly denies pain, reports left shoulder tightness that increases with cervical left rotation. Pt reported some relief with manual therapy and Biofreeze. Pt noted to have multiple significant trigger points in left upper trap and levator scap. Improved cervical rotation noted at conclusion of session. PT began addressing improving scapular stability to decrease forward head/ rounded shoulders posture to decrease headaches. Pt can benefit from continued skilled PT ot address impairments to decrease headache occurrence and intensity to improve QOL.     Pt rehab potential is Good. Pt will benefit from continuing skilled outpatient physical therapy to address the deficits listed below in the problem list, provide pt/family education and to maximize pt's level of independence in the home and community environment.         Medical necessity is demonstrated  by the following IMPAIRMENTS/PROMBLEM LIST:   1. Fall Risk - impaired balance   2. Weakness   3. Impaired muscle tone  4. Decreased ROM  5. Gait deviations   6. Impaired ambulation   7. Decreased activity tolerance   8. Difficulty participating in daily activities   9. Continued inability to participate in vocational pursuits   10. Requires skilled supervision to complete and progress HEP      Anticipated barriers to physical therapy: history of HA since injury     Pt's spiritual, cultural and educational needs considered and pt agreeable to plan of care and goals as stated below:      GOALS:   Short term goals: 6 weeks, pt agrees to goals set.  1. Pt will perform HEP for UE/ scapular strengthening and cervical ROM/ stability with supervision to improve carryover of progress and decrease pain.  2. Pt will demonstrate improved cervical flex/ ext ROM by 10 degrees to demonstrate improved ROM to improve posture.  3. Pt will demonstrate improved cervical rotation to 50 degrees to decrease pain.   4. Pt will report decreased impairment per Oswestry by scoring impairment at 40% or less.  5. Pt will demonstrate improved right shoulder AROM for flexion to 150 degrees to be WFLs.  6. Pt will report decreased left shoulder pain to 5/10 with AROM to improve mobility and demonstrate decreased pain.  7. Assess FGA and set goals as needed for balance.     Long term goals: 12 weeks, pt agrees to goals set  8. Pt will report decreased HA to 3x/ week or less with no ER visits due to pain x 4 weeks.  9. Pt will demonstrate improved cervical flex/ ext AROM to 60 degrees to demonstrate normal/ functional ROM and to decrease pain.  10. Pt will demonstrate improved cervical rotation to 65 degrees to demonstrate improved mobility and decrease HA.  11. Pt will report decreased impairment per Oswestry by scoring impairment at 25% or less.  12. Pt will report decreased impact of pain on daily functioning by scoring 25% or less on Pain  Disability Index.  13. Pt will demonstrate right shoulder AROM for abd to 130 degrees to be WFLs.             Plan   Continue PT 2-3 x weekly under established Plan of Care, with treatment to include: pt education, HEP, therapeutic exercises, neuromuscular re-education/balance exercises, therapeutic activities, joint mobilizations, and modalities PRN, to work towards established goals. Pt may be seen by PTA to carry out plan of care.     Mariel Miguel, PT   02/07/2017

## 2017-02-09 ENCOUNTER — TELEPHONE (OUTPATIENT)
Dept: ORTHOPEDICS | Facility: CLINIC | Age: 52
End: 2017-02-09

## 2017-02-10 ENCOUNTER — OFFICE VISIT (OUTPATIENT)
Dept: ORTHOPEDICS | Facility: CLINIC | Age: 52
End: 2017-02-10
Payer: MEDICARE

## 2017-02-10 VITALS
DIASTOLIC BLOOD PRESSURE: 85 MMHG | HEIGHT: 64 IN | WEIGHT: 274 LBS | HEART RATE: 79 BPM | BODY MASS INDEX: 46.78 KG/M2 | SYSTOLIC BLOOD PRESSURE: 140 MMHG

## 2017-02-10 DIAGNOSIS — Z98.890 STATUS POST SURGERY: Primary | ICD-10-CM

## 2017-02-10 PROCEDURE — 99213 OFFICE O/P EST LOW 20 MIN: CPT | Mod: PBBFAC | Performed by: PLASTIC SURGERY

## 2017-02-10 PROCEDURE — 99999 PR PBB SHADOW E&M-EST. PATIENT-LVL III: CPT | Mod: PBBFAC,,, | Performed by: PLASTIC SURGERY

## 2017-02-10 PROCEDURE — 99024 POSTOP FOLLOW-UP VISIT: CPT | Mod: ,,, | Performed by: PLASTIC SURGERY

## 2017-02-10 NOTE — PROGRESS NOTES
"Ms. Chawla is here today for a post-operative visit.she is 7 days status post left OCTR on 2/3/17. she reports that she is doing well.  Pain is minimal.  she is not taking pain medication . she denies fever, chills, and sweats since the time of the surgery.     Physical exam:    Vitals:    02/10/17 1040 02/10/17 1041   BP: (!) 153/99 (!) 140/85   Pulse: 79    Weight: 124.3 kg (274 lb)    Height: 5' 4" (1.626 m)    PainSc:   7   7   PainLoc: Wrist      Post op dressing taken down.  Incision is clean, dry and intact.  There is no erythema or exudate.  There is no sign of any infection. she is NVI.     Assessment: 7 days status post left open CTR     Plan:  Amna was seen today for post-op evaluation, post-op evaluation and pain.    Diagnoses and all orders for this visit:    Status post surgery      - no soaking the incision for at least 2 days, may get it wet in the shower and use regular soap  - Continue Range of motion exercises   - Tylenol 500 mg and/or Ibuprofen 400mg every 4-6 hours with food for pain as tolerated  - - Follow up: one week    "

## 2017-02-13 ENCOUNTER — DOCUMENTATION ONLY (OUTPATIENT)
Dept: REHABILITATION | Facility: HOSPITAL | Age: 52
End: 2017-02-13

## 2017-02-13 ENCOUNTER — CLINICAL SUPPORT (OUTPATIENT)
Dept: REHABILITATION | Facility: HOSPITAL | Age: 52
End: 2017-02-13
Attending: PSYCHIATRY & NEUROLOGY
Payer: MEDICARE

## 2017-02-13 DIAGNOSIS — F06.8 COGNITIVE DEFICIT AS LATE EFFECT OF TRAUMATIC BRAIN INJURY: Primary | ICD-10-CM

## 2017-02-13 DIAGNOSIS — R29.898 DECREASED ROM OF NECK: Chronic | ICD-10-CM

## 2017-02-13 DIAGNOSIS — Z74.09 IMPAIRED MOBILITY AND ADLS: Primary | Chronic | ICD-10-CM

## 2017-02-13 DIAGNOSIS — S09.90XS HEADACHES DUE TO OLD HEAD INJURY: Chronic | ICD-10-CM

## 2017-02-13 DIAGNOSIS — Z78.9 IMPAIRED MOBILITY AND ADLS: Primary | Chronic | ICD-10-CM

## 2017-02-13 DIAGNOSIS — S06.9X0S COGNITIVE DEFICIT AS LATE EFFECT OF TRAUMATIC BRAIN INJURY: Primary | ICD-10-CM

## 2017-02-13 DIAGNOSIS — G44.309 HEADACHES DUE TO OLD HEAD INJURY: Chronic | ICD-10-CM

## 2017-02-13 PROCEDURE — 97532 *HC SP COG SKL DEV EA 15 MIN: CPT | Mod: PO

## 2017-02-13 PROCEDURE — 97110 THERAPEUTIC EXERCISES: CPT | Mod: PO

## 2017-02-13 PROCEDURE — 97140 MANUAL THERAPY 1/> REGIONS: CPT | Mod: PO

## 2017-02-13 NOTE — PROGRESS NOTES
"                                                      Physical Therapy Progress Note     Name: Amna Chawla  Clinic Number: [id number]  Diagnosis:   M54.81 (ICD-10-CM) - Bilateral occipital neuralgia   G93.1 (ICD-10-CM) - Anoxic brain injury   R51 (ICD-10-CM) - Cervicogenic headache     Physician: Missy Rock MD  Treatment Orders: PT Eval and Treat  Past Medical History   Diagnosis Date    Asthma     Brain anoxic injury     Coronary artery disease     Defibrillator activation 2013    Depression     History of sudden cardiac arrest 2/2013     PEA arrest with subsequent long-QT    Hypertension     Pacemaker 2013    Seizures     Stroke      weakness lt side       Precautions: standard  Visit #:5  Date of Eval: 1/18/17  Plan of Care Expiration: 4/12/17    Functional Limitations Reports - G Codes  Category: other   Tool: cervical rotation ROM, Oswestry Disability Index, Pain Disability Index  Score: right= 46 degrees, left= 33 degrees, 50%, 55%    G codes 5/10    G code Mobility   eval CK-CJ                 Subjective   Pt reports: " I have a really bad head ache today."   Pain Scale:  10/10 headache pain    Objective   Blood pressure 145/92, HR 60    Patient received Amna received therapeutic exercises x 30 to develop strength, endurance, ROM, flexibility, posture and core stabilization for 24 minutes including:  therapy with activities as follows:   MH to B cervical spine in supine x 10 minutes prior to exercises 2 * increased pain    PROM: cervical rotation x 5  reps     Seated:    Chin tu cks 2  x10     UT stretch 3 x 30"               Levator stretch 3  X 30"                  Cervical rotation x 5       Pt participated in Manual Therapy 15  minutes to address pain, ROM, and tightness   left upper trap, levator scap, erector spinae to mid traps  Occipital release.  5 x 30 sec     Manual muscle play of left upper traps with Biofreeze    Written Home Exercises: Provided   Pt demo good understanding of the " education provided. Amna demonstrated good return demonstration of activities.     Education provided re: POC, HEP  No spiritual or educational barriers to learning provided    Pt has no cultural, educational or language barriers to learning provided.    Assessment   Amna tolerated treatment well. Pt arrived with a 10/10 headache and began tx session with moist heat to cervical region.  Pt tight on the entire left side of her cervical region and reported slight relief following tx session.  Pt reported Biofreeze helped out last time and it was applied again after tx session.  Cont to report headache at end of tx session.       Pt rehab potential is Good. Pt will benefit from continuing skilled outpatient physical therapy to address the deficits listed below in the problem list, provide pt/family education and to maximize pt's level of independence in the home and community environment.         Medical necessity is demonstrated by the following IMPAIRMENTS/PROMBLEM LIST:   1. Fall Risk - impaired balance   2. Weakness   3. Impaired muscle tone  4. Decreased ROM  5. Gait deviations   6. Impaired ambulation   7. Decreased activity tolerance   8. Difficulty participating in daily activities   9. Continued inability to participate in vocational pursuits   10. Requires skilled supervision to complete and progress HEP      Anticipated barriers to physical therapy: history of HA since injury     Pt's spiritual, cultural and educational needs considered and pt agreeable to plan of care and goals as stated below:      GOALS:   Short term goals: 6 weeks, pt agrees to goals set.  1. Pt will perform HEP for UE/ scapular strengthening and cervical ROM/ stability with supervision to improve carryover of progress and decrease pain.  2. Pt will demonstrate improved cervical flex/ ext ROM by 10 degrees to demonstrate improved ROM to improve posture.  3. Pt will demonstrate improved cervical rotation to 50 degrees to decrease pain.    4. Pt will report decreased impairment per Oswestry by scoring impairment at 40% or less.  5. Pt will demonstrate improved right shoulder AROM for flexion to 150 degrees to be WFLs.  6. Pt will report decreased left shoulder pain to 5/10 with AROM to improve mobility and demonstrate decreased pain.  7. Assess FGA and set goals as needed for balance.     Long term goals: 12 weeks, pt agrees to goals set  8. Pt will report decreased HA to 3x/ week or less with no ER visits due to pain x 4 weeks.  9. Pt will demonstrate improved cervical flex/ ext AROM to 60 degrees to demonstrate normal/ functional ROM and to decrease pain.  10. Pt will demonstrate improved cervical rotation to 65 degrees to demonstrate improved mobility and decrease HA.  11. Pt will report decreased impairment per Oswestry by scoring impairment at 25% or less.  12. Pt will report decreased impact of pain on daily functioning by scoring 25% or less on Pain Disability Index.  13. Pt will demonstrate right shoulder AROM for abd to 130 degrees to be WFLs.             Plan   Continue PT 2-3 x weekly under established Plan of Care, with treatment to include: pt education, HEP, therapeutic exercises, neuromuscular re-education/balance exercises, therapeutic activities, joint mobilizations, and modalities PRN, to work towards established goals. Pt may be seen by PTA to carry out plan of care.     Julissa Fine, PTA   02/13/2017

## 2017-02-13 NOTE — PROGRESS NOTES
I, Mariel Miguel DPT, met with Julissa Fine PTA to discuss this pt's POC. Pt presents with a significant forward head posture and HA. decreased cervical rotation is noted. Pt responds well to soft tissue manipulation, may use Biofreeze. Pt is participating in stretching, AROM, chin tucks and scapular strengthening. Stretch pecs. Perform PROM to shoulder if pt has complaints of tightness or pain. May use heat as needed.    Mariel Miguel DPT  2/13/2017    Face to face consultation with supervision PT held on 02/13/2017    Julissa Fine PTA

## 2017-02-13 NOTE — PROGRESS NOTES
OUTPATIENT NEUROLOGICAL REHABILITATION  SPEECH THERAPY PROGRESS NOTE    Date:  02/13/2017    Start Time:  0815  Stop Time:  0900  Subjective/History  Onset Date:  February 7, 2013  Primary Diagnosis:  Anoxic Brain Injury  Treatment Diagnosis:  Cognitive-Linguistic Impairment  Referring Provider:  Dr. Rock  Orders: ST for evaluation and treat  Current Medical History:  Mrs. Chawla presents to the [facility] secondary to the diagnosis of anoxic brain injury. She suffered a cardiac arrest, CVA, and fell and hit her head and suffered a concussion in 2013. She had some complications while hospitalized during that time. She now has a pacemaker and defibrillator and suffers with depression and headaches.  No family was present during this evaluation to provide history. She took the RTA LIFT today.    Past Medical History:   Past Medical History   Diagnosis Date    Asthma     Brain anoxic injury     Coronary artery disease     Defibrillator activation 2013    Depression     History of sudden cardiac arrest 2/2013     PEA arrest with subsequent long-QT    Hypertension     Pacemaker 2013    Seizures     Stroke      weakness lt side     Precautions: Memory issues, pacemaker, defibrillator          TIMED  Procedure Time Min.   Cognitive Start: 0815  Stop: 0900  45    Start:  Stop:     Start:  Stop:     Start:  Stop:          UNTIMED  Procedure Time Min.    Start:    Stop:       Start:  Stop:      Total Minutes: 45  Total Timed Units: 3  Total Untimed Units: 0  Charges Billed/# of units: 3    Visit #: 2  Date of Evaluation: 1/6/17  Plan of Care Expiration:  3/17/17  Extended POC:   G-CODE  2/10    eval  CJ/CI   update              Progress/Current Status    Subjective:     Pain: 0/10    Mrs. Chawla was motivated throughout the session.     Objective:     Short Term Goals (6 weeks):   1. Mrs. Chawla will complete short term memory tasks with 90% accuracy using  compensatory strategies to increase memory.  - Harsh: Pt was given a harsh to study for 2 minutes and asked questions: 80% acc ind'ly with self corrections  - Mental Manipulation: Reverse Order: 90% acc ind'ly  - Word List Retention: recall by attribute: 80% acc ind'ly, 90% acc given one repetition  - Name/Face association: 40% acc ind'ly    2. Mrs. Chawla will complete moderate to complex problem solving, reasoning, mental manipulation, sequencing and organization tasks with 90% accuracy to increase cognition.   - Pt followed written directions: 100% acc ind'ly  - Pt sequenced 6 cards: 100% acc ind'ly with one self correction    3. Mrs. Chawla will list 15 to 20 items in categories within one minute to improve word fluency and thought organization.   - Things in kitchen: 16  - Food: 20  - Things that are red: 6    Long Term Goals (8 weeks):   1. Mrs. Chawla will increase her cognitive-linguistic skills using compensatory strategies to improve functional independence.   2.1. Mrs. Chawla will demonstrate understanding and use of compensatory strategies to improve functional independence.     Assessment:     Mrs. Chawla is making progress towards her goals. She reported that she loses her train of thought when she is interrupted.    Mrs. 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 Her deficits were characterized by decreased short term memory, decreased attention/concentration, decreased moderate to complex problem-solving, reasoning, sequencing and organization. Auditory comprehension, verbal expression, voice, and fluency were within functional limits. She will benefit from outpatient neurological rehabilitation speech therapy. A repeat neuropsychological evaluation may be beneficial since the last one was in 2013.     Functional Communication Measure (FCM):   Severity Modifier for Medicare G-Code:  *Memory  Current status: FCM: Level 5  - CJ   at least 20% < 40% impaired, limited or restricted  Projected status: FCM: Level 6  - CI  at least 1% but less than 20% impaired, limited or restricted     Problem Solving  Current status: FCM: Level 5  - CJ  at least 20% < 40% impaired, limited or restricted  Projected status: FCM: Level 6  - CI  at least 1% but less than 20% impaired, limited or restricted    Rehab Potential: good to fair (based on time of onset)    Patient Education/Response:      Mrs. Chawla was educated on the plan of care and recommendations. She verbalized understanding and agreed with the plan of care.     Plans and Goals:     Certification Period: 01/04/17 to 12/31/17  Plan of Care Certification Period: 01/16/17 to 03/17/17    Continue POC    Recommended Treatment Plan:  Mrs. Chawla will   participate in the [facility] rehabilitation program for speech therapy 2 times per week to address her deficits.    Other Recommendations: Neuropsychological evaluation      KENNEDY Nunez.   Clinician

## 2017-02-15 ENCOUNTER — TELEPHONE (OUTPATIENT)
Dept: ORTHOPEDICS | Facility: CLINIC | Age: 52
End: 2017-02-15

## 2017-02-16 ENCOUNTER — CLINICAL SUPPORT (OUTPATIENT)
Dept: REHABILITATION | Facility: HOSPITAL | Age: 52
End: 2017-02-16
Attending: PSYCHIATRY & NEUROLOGY
Payer: MEDICARE

## 2017-02-16 DIAGNOSIS — F06.8 COGNITIVE DEFICIT AS LATE EFFECT OF TRAUMATIC BRAIN INJURY: Primary | ICD-10-CM

## 2017-02-16 DIAGNOSIS — S06.9X0S COGNITIVE DEFICIT AS LATE EFFECT OF TRAUMATIC BRAIN INJURY: Primary | ICD-10-CM

## 2017-02-16 PROCEDURE — 97532 *HC SP COG SKL DEV EA 15 MIN: CPT | Mod: PO,59

## 2017-02-16 PROCEDURE — 92507 TX SP LANG VOICE COMM INDIV: CPT | Mod: PO

## 2017-02-16 NOTE — PROGRESS NOTES
OUTPATIENT NEUROLOGICAL REHABILITATION  SPEECH THERAPY PROGRESS NOTE    Date:  02/16/2017    Start Time:  1125  Stop Time:  1200  Subjective/History  Onset Date:  February 7, 2013  Primary Diagnosis:  Anoxic Brain Injury  Treatment Diagnosis:  Cognitive-Linguistic Impairment  Referring Provider:  Dr. Rock  Orders: ST for evaluation and treat  Current Medical History:  Mrs. Chawla presents to the [facility] secondary to the diagnosis of anoxic brain injury. She suffered a cardiac arrest, CVA, and fell and hit her head and suffered a concussion in 2013. She had some complications while hospitalized during that time. She now has a pacemaker and defibrillator and suffers with depression and headaches.  No family was present during this evaluation to provide history. She took the RTA LIFT today.    Past Medical History:   Past Medical History   Diagnosis Date    Asthma     Brain anoxic injury     Coronary artery disease     Defibrillator activation 2013    Depression     History of sudden cardiac arrest 2/2013     PEA arrest with subsequent long-QT    Hypertension     Pacemaker 2013    Seizures     Stroke      weakness lt side     Precautions: Memory issues, pacemaker, defibrillator          TIMED  Procedure Time Min.   Cognitive Start: 1125  Stop: 1155  30    Start:  Stop:     Start:  Stop:     Start:  Stop:          UNTIMED  Procedure Time Min.   Speech Language Therapy Start:  1155  Stop: 1200  5    Start:  Stop:      Total Minutes: 35  Total Timed Units: 2  Total Untimed Units: 1  Charges Billed/# of units: 1    Visit #: 3  Date of Evaluation: 1/6/17  Plan of Care Expiration:  3/17/17  Extended POC:   G-CODE  3/10    eval  CJ/CI   update              Progress/Current Status    Subjective:     Pain: 0/10    Mrs. Chawla was motivated throughout the session.     Objective:     Short Term Goals (6 weeks):   1. Mrs. Chawla will complete short term memory tasks  with 90% accuracy using compensatory strategies to increase memory.  - Name/Face association: 100% acc ind'ly  - Mental Manipulation: Reverse Order: 90% acc ind'ly, 100% acc given repetitions  - Functional Memory: Pt was read a short paragraph and answered questions containing 3 elements: 60% acc ind'ly, 100% acc given repetitions.     2. Mrs. Chawla will complete moderate to complex problem solving, reasoning, mental manipulation, sequencing and organization tasks with 90% accuracy to increase cognition.   - Pt followed written directions: 88% acc ind'ly, 100% acc given cues.  - Pt compared words with 100% acc by stating one similarity and one difference  - Pt was given 3 items and asked to state the category: 80% acc ind'ly, 100% acc given cues.   - Word deduction: 90% acc ind'ly, 100% acc ind'ly given cues.   - Pt lawrence hands on a clock with 100% acc ind'ly  - Pt completed math word problems: 100% acc ind'ly  - Pt sequenced 4 step activities with 100% acc ind'ly    3. Mrs. Chawla will list 15 to 20 items in categories within one minute to improve word fluency and thought organization.   - Things that are expensive: 9    Long Term Goals (8 weeks):   1. Mrs. Chawla will increase her cognitive-linguistic skills using compensatory strategies to improve functional independence.   2.1. Mrs. Chawla will demonstrate understanding and use of compensatory strategies to improve functional independence.     Assessment:     Mrs. Chawla is making progress towards her goals. She was educated on compensatory strategies (writing things to remember down on paper) to improve her memory.     Mrs. 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 Her deficits were characterized by decreased short term memory, decreased attention/concentration, decreased moderate to complex problem-solving, reasoning, sequencing and organization. Auditory comprehension, verbal  expression, voice, and fluency were within functional limits. She will benefit from outpatient neurological rehabilitation speech therapy. A repeat neuropsychological evaluation may be beneficial since the last one was in 2013.     Functional Communication Measure (FCM):   Severity Modifier for Medicare G-Code:  *Memory  Current status: FCM: Level 5  - CJ  at least 20% < 40% impaired, limited or restricted  Projected status: FCM: Level 6  - CI  at least 1% but less than 20% impaired, limited or restricted     Problem Solving  Current status: FCM: Level 5  - CJ  at least 20% < 40% impaired, limited or restricted  Projected status: FCM: Level 6  - CI  at least 1% but less than 20% impaired, limited or restricted    Rehab Potential: good to fair (based on time of onset)    Patient Education/Response:      Mrs. Chawla was educated on the plan of care and recommendations. She verbalized understanding and agreed with the plan of care.     Plans and Goals:     Certification Period: 01/04/17 to 12/31/17  Plan of Care Certification Period: 01/16/17 to 03/17/17    Continue POC    Recommended Treatment Plan:  Mrs. Chawla will   participate in the [facility] rehabilitation program for speech therapy 2 times per week to address her deficits.    Other Recommendations: Neuropsychological evaluation      KENNEDY Nunez.   Clinician

## 2017-02-17 ENCOUNTER — OFFICE VISIT (OUTPATIENT)
Dept: ORTHOPEDICS | Facility: CLINIC | Age: 52
End: 2017-02-17
Payer: MEDICARE

## 2017-02-17 VITALS
HEART RATE: 67 BPM | WEIGHT: 274 LBS | BODY MASS INDEX: 46.78 KG/M2 | SYSTOLIC BLOOD PRESSURE: 123 MMHG | HEIGHT: 64 IN | DIASTOLIC BLOOD PRESSURE: 83 MMHG

## 2017-02-17 DIAGNOSIS — Z98.890 STATUS POST SURGERY: Primary | ICD-10-CM

## 2017-02-17 PROCEDURE — 99024 POSTOP FOLLOW-UP VISIT: CPT | Mod: ,,, | Performed by: PLASTIC SURGERY

## 2017-02-17 PROCEDURE — 99212 OFFICE O/P EST SF 10 MIN: CPT | Mod: PBBFAC | Performed by: PLASTIC SURGERY

## 2017-02-17 PROCEDURE — 99999 PR PBB SHADOW E&M-EST. PATIENT-LVL II: CPT | Mod: PBBFAC,,, | Performed by: PLASTIC SURGERY

## 2017-02-17 NOTE — PROGRESS NOTES
"Ms. Chawla is here today for a post-operative visit.she is 14 days status post left OCTR on 2/3/17. she reports that she is doing well.  Pain is minimal complains of mild pain at proximal wound and has been noticing drainage on bandaids..  she is not taking pain medication . she denies fever, chills, and sweats since the time of the surgery.     Physical exam:    Vitals:    02/17/17 1137 02/17/17 1138   BP: 123/83    Pulse: 67    Weight: 124.3 kg (274 lb)    Height: 5' 4" (1.626 m)    PainSc:   6   6   PainLoc: Wrist      Incision is clean and intact, mildly moist, no purulence.  There is no erythema or exudate.  There is no sign of any infection. she is NVI.  Mild tenderness to palpation    Assessment: 14 days status post left open CTR     Plan:  Amna was seen today for pain and post-op evaluation.    Diagnoses and all orders for this visit:    Status post surgery      - no soaking the incision for at least 2 days, may get it wet in the shower and use regular soap  - half sutures removed today, no occulsive dressings or bandaids, pt instructed to remove dressings at home and while sleeping to allow the wound to dry.  - Continue Range of motion exercises   - Tylenol 500 mg and/or Ibuprofen 400mg every 4-6 hours with food for pain as tolerated  - - Follow up: next wednesday  "

## 2017-02-20 ENCOUNTER — TELEPHONE (OUTPATIENT)
Dept: ORTHOPEDICS | Facility: CLINIC | Age: 52
End: 2017-02-20

## 2017-02-20 ENCOUNTER — PATIENT MESSAGE (OUTPATIENT)
Dept: NEUROLOGY | Facility: CLINIC | Age: 52
End: 2017-02-20

## 2017-02-21 ENCOUNTER — OFFICE VISIT (OUTPATIENT)
Dept: NEUROLOGY | Facility: CLINIC | Age: 52
End: 2017-02-21
Attending: PSYCHIATRY & NEUROLOGY
Payer: MEDICARE

## 2017-02-21 ENCOUNTER — PATIENT MESSAGE (OUTPATIENT)
Dept: NEUROLOGY | Facility: CLINIC | Age: 52
End: 2017-02-21

## 2017-02-21 ENCOUNTER — TELEPHONE (OUTPATIENT)
Dept: NEUROLOGY | Facility: CLINIC | Age: 52
End: 2017-02-21

## 2017-02-21 VITALS
BODY MASS INDEX: 45.54 KG/M2 | HEIGHT: 64 IN | HEART RATE: 68 BPM | SYSTOLIC BLOOD PRESSURE: 130 MMHG | DIASTOLIC BLOOD PRESSURE: 88 MMHG | WEIGHT: 266.75 LBS

## 2017-02-21 DIAGNOSIS — R09.02 ANOXIA: ICD-10-CM

## 2017-02-21 DIAGNOSIS — R41.89 COGNITIVE DEFICITS: ICD-10-CM

## 2017-02-21 DIAGNOSIS — G43.711 CHRONIC MIGRAINE WITHOUT AURA, WITH INTRACTABLE MIGRAINE, SO STATED, WITH STATUS MIGRAINOSUS: Primary | ICD-10-CM

## 2017-02-21 DIAGNOSIS — G47.8 UPPER AIRWAY RESISTANCE SYNDROME: ICD-10-CM

## 2017-02-21 DIAGNOSIS — G93.1 ANOXIC BRAIN INJURY: ICD-10-CM

## 2017-02-21 PROCEDURE — 99214 OFFICE O/P EST MOD 30 MIN: CPT | Mod: S$PBB,,, | Performed by: PSYCHIATRY & NEUROLOGY

## 2017-02-21 PROCEDURE — 99214 OFFICE O/P EST MOD 30 MIN: CPT | Mod: PBBFAC | Performed by: PSYCHIATRY & NEUROLOGY

## 2017-02-21 PROCEDURE — 99999 PR PBB SHADOW E&M-EST. PATIENT-LVL IV: CPT | Mod: PBBFAC,,, | Performed by: PSYCHIATRY & NEUROLOGY

## 2017-02-21 RX ORDER — ONDANSETRON 4 MG/1
4 TABLET, ORALLY DISINTEGRATING ORAL
Qty: 8 TABLET | Refills: 3 | Status: SHIPPED | OUTPATIENT
Start: 2017-02-21 | End: 2018-01-04 | Stop reason: SDUPTHER

## 2017-02-21 RX ORDER — TRAZODONE HYDROCHLORIDE 100 MG/1
100 TABLET ORAL NIGHTLY
Qty: 30 TABLET | Refills: 11 | Status: SHIPPED | OUTPATIENT
Start: 2017-02-21 | End: 2017-03-23 | Stop reason: SDUPTHER

## 2017-02-21 NOTE — TELEPHONE ENCOUNTER
Called and left message for Patient Relations per order of Dr Cortez.Requested they contact Patient regarding expectations of the infusion unit-as on her last visit,she stated again and again that her ride was on the way-as the infusion unit standard is for Patients to have ride home and Nursing staff to visualize the ride home due to the administration of sedating meds.Patient kept Nursing staff waiting until 5:45 for ride-and since she has been to unit before,she was well aware that unit closes at 5 PM.Left message also to inform them that Lisa Hayes RN is out of unit today,and could they mediate with Patient to enable expectations and rules be adhered to.There are no availabilities in infusion today for any further migraine Patients,as one is already scheduled.

## 2017-02-21 NOTE — PATIENT INSTRUCTIONS
-conservative measures: cognitive rest, avoid further TBIs, abstain from EtOH  -sleep/wake cycle:   -sleep hygiene   -trazodone 100mg at night as needed    -if you have odd symptoms like sweating, stiffness, temperature or confusion stop taking and call 911   -sleep clinic referral (please schedule appointment across the [place])  -cognitive(memory losses):  Continue Speech therapy and neuropsych testing  -start occupational therapy  -continue physical therapy  -headaches: per Dr. Cortez    Sleep Hygiene:    1. Avoid watching TV, eating, and discussing emotional issues in bed. The bed should be used for sleep and sex only. If not, we can associate the bed with other activities and it often becomes difficult to fall asleep.  2. Minimize noise, light, and temperature extremes during sleep with ear plugs, window blinds, or an electric blanket or air conditioner. Even the slightest nighttime noises or luminescent lights can disrupt the quality of your sleep. Try to keep your bedroom at a comfortable temperature -- not too hot (above 75 degrees) or too cold (below 54 degrees).  3. Try not to drink fluids after 8 p.m. This may reduce awakenings due to urination.  4. Avoid naps, but if you do nap, make it no more than about 25 minutes about eight hours after you awake. But if you have problems falling asleep, then no naps for you.  5. Do not expose your self to bright light if you need to get up at night. Use a small night-light instead.  6. Nicotine is a stimulant and should be avoided particularly near bedtime and upon night awakenings. Having a smoke before bed, although it may feel relaxing, is actually putting a stimulant into your bloodstream.  7. Caffeine is also a stimulant and is present in coffee (100-200 mg), soda (50-75 mg), tea (50-75 mg), and various over-the-counter medications. Caffeine should be discontinued at least four to six hours before bedtime. If you consume large amounts of caffeine and you cut your  self off too quickly, beware; you may get headaches that could keep you awake.  8. Although alcohol is a depressant and may help you fall asleep, the subsequent metabolism that clears it from your body when you are sleeping causes a withdrawal syndrome. This withdrawal causes awakenings and is often associated with nightmares and sweats.  9. A light snack may be sleep-inducing, but a heavy meal too close to bedtime interferes with sleep. Stay away from protein and stick to carbohydrates or dairy products. Milk contains the amino acid L-tryptophan, which has been shown in research to help people go to sleep. So milk and cookies or crackers (without chocolate) may be useful and taste good as well.

## 2017-02-21 NOTE — PROGRESS NOTES
"Subjective:       Patient ID: Amna Chawla is a 51 y.o. female.    Chief Complaint:  No chief complaint on file.      History of Present Illness    50 yo AAF with HIE w/ CVA (2013), Asthma, CAD, depression, HTN, PM, seizures, CVA who presents for concussion evaluation.  Pt was last seen on 1/4/17 and at that time we recommended:    -conservative measures: cognitive rest, avoid further TBIs, abstain from EtOH  -MRI Brain (contra-indicated due to PM)  -sleep/wake cycle:   -sleep hygiene   -melatonin 0.1-0.3mg at night  -cognitive(memory losses):  SLP, repeat neuropsych test  -ADLs: OT  -headaches: per Dr. Cortez   -PT cervical ROM  -BIALA and ABIS referral    In the interim, pt continues to have headaches.  She has started SLP and PT.  NP and OT testing is pending.  Pt is not sleeping well.  She tried melatonin in the past and developed 'shakes'.  She has tried trazodone with no effect either positive or adverse.  [facility] and found to have UARS and insurance did not cover CPAP.       Initial HPI(1/4/17)  Per previous records, patient was in the hospital early 2013 after "passed out" and became unresponsive. CPR in the field for 8 minutes until EMS arrived. Per ED note, on arrival she was found to be in PEA, given epinephrine. Vfib/Vtach was achieved which was shocked x1. Patient converted to sinus tachycardia after shock. I do not know the time course for the above treatment. On arrival to facility patient was in sinus tachycardia with strong pulses, intubated and unresponsive. ET tube placement was confirmed with direct laryngoscopy and good bilateral breath sounds were heard after the tube was pulled back 2 cm. Reported to have "withdrawal" movements in ER during stabilization, then sedated and cooling protocol initiated. Had remarkable recovery and first seen in clinic in April 2013.  During this episode patient fell and hit her head.  NO bleed was noted at that time.    She has " extensive headaches.  She is on numerous PPX medications and also receives botox and infusion therapy.    Her headaches are associated with neck pain.  She has received trigger point injections which have helped.  She has associated nausea, dizziness.    She endorses poor sleep.  She is currently using tizanadine.  She endorses cognitive issues and is currently donepezil, which helps somewhat.  Her mood is labile, and currently is good.  She denies SI/HI.  She has episodes that she calls seizures.  She says she is aware of shaking episodes (2-3 since the cardiac arrest).    She denies any changes in vision, previous head/neck trauma, appetite/smell changes.  She is no longer having her cycle.  SHe is not involved in litigation.   She lives with her daughters.   SHe is independent in ADLs, but requires assistance iADLs.  She acknowledges difficulties with house-hold chores, e.g. Cooking, dish washing, etc.            Past Medical History   Diagnosis Date    Asthma     Brain anoxic injury     Coronary artery disease     Defibrillator activation 2013    Depression     History of sudden cardiac arrest 2/2013     PEA arrest with subsequent long-QT    Hypertension     Pacemaker 2013    Seizures     Stroke      weakness lt side       Past Surgical History   Procedure Laterality Date    Insert / replace / remove pacemaker      Cardiac defibrillator placement      Breast surgery      Hiatal hernia repair      Breast reduction      Tubal ligation      Cardiac defibrillator placement      Hernia repair      Cosmetic surgery      Carpal tunnel release Right        Family History   Problem Relation Age of Onset    Hypertension Mother     COPD      Heart failure         Social History     Social History    Marital status: Single     Spouse name: N/A    Number of children: N/A    Years of education: N/A     Occupational History     [facility] [employer] [facility] [employer]     Social History Main Topics    Smoking status:  Never Smoker    Smokeless tobacco: Never Used    Alcohol use No    Drug use: No    Sexual activity: No     Other Topics Concern    Not on file     Social History Narrative     2/21/17            Current Outpatient Prescriptions:     aripiprazole (ABILIFY) 5 MG Tab, TAKE 1 TABLET(5 MG) BY MOUTH EVERY DAY, Disp: 90 tablet, Rfl: 5    atorvastatin (LIPITOR) 40 MG tablet, TAKE ONE TABLET BY MOUTH EVERY MORNING, Disp: 30 tablet, Rfl: 0    carvedilol (COREG) 25 MG tablet, TAKE 1 TABLET(25 MG) BY MOUTH TWICE DAILY WITH MEALS, Disp: 180 tablet, Rfl: 11    chlorthalidone (HYGROTEN) 25 MG Tab, TAKE 1 TABLET BY MOUTH EVERY DAY, Disp: 90 tablet, Rfl: 11    divalproex (DEPAKOTE) 250 MG EC tablet, Take 250 mg by mouth once daily. , Disp: , Rfl: 11    donepezil (ARICEPT) 10 MG tablet, Take 1 tablet (10 mg total) by mouth 2 (two) times daily., Disp: 180 tablet, Rfl: 3    duloxetine (CYMBALTA) 60 MG capsule, Take 1 capsule (60 mg total) by mouth 2 (two) times daily., Disp: 180 capsule, Rfl: 1    gabapentin (NEURONTIN) 300 MG capsule, Take 3 capsules (900 mg total) by mouth 3 (three) times daily., Disp: 810 capsule, Rfl: 12    lamotrigine (LAMICTAL) 25 MG tablet, Take 6 tablets (150 mg total) by mouth once daily., Disp: 180 tablet, Rfl: 11    lorazepam (ATIVAN) 0.5 MG tablet, Take 1 tablet (0.5 mg total) by mouth every 6 (six) hours as needed for Anxiety., Disp: 30 tablet, Rfl: 1    magnesium 200 mg Tab, Take 200 mg by mouth once daily., Disp: , Rfl:     mefenamic acid 250 mg Cap, TAKE 2 CAPSULES BY MOUTH TWICE DAILY AS NEEDED., Disp: 60 each, Rfl: 8    pantoprazole (PROTONIX) 40 MG tablet, Take 1 tablet (40 mg total) by mouth once daily., Disp: 30 tablet, Rfl: 11    tiagabine (GABITRIL) 4 MG tablet, Take 1 tablet (4 mg total) by mouth 2 (two) times daily as needed (headache)., Disp: 40 tablet, Rfl: 11    tizanidine (ZANAFLEX) 4 MG tablet, Take 4 mg by mouth every 6 (six) hours as needed., Disp: , Rfl:     VITAMIN  D2 50,000 unit capsule, TAKE 1 CAPSULE BY MOUTH EVERY 7 DAYS, Disp: 6 capsule, Rfl: 0    zolpidem (AMBIEN) 10 mg Tab, Take 1 tablet (10 mg total) by mouth nightly as needed., Disp: 30 tablet, Rfl: 5    desipramine (NORPRAMIN) 25 MG tablet, Take 1 tablet (25 mg total) by mouth once daily., Disp: 30 tablet, Rfl: 11    topiramate (TOPAMAX) 100 MG tablet, Take 1.5 tablets (150 mg total) by mouth 2 (two) times daily., Disp: 90 tablet, Rfl: 12    Current Facility-Administered Medications:     onabotulinumtoxina injection 200 Units, 2 vial, Intramuscular, Q90 Days, David Cortez MD, 200 Units at 09/15/16 1225    onabotulinumtoxina injection 200 Units, 2 vial, Intramuscular, Q90 Days, David Cortez MD    onabotulinumtoxina injection 200 Units, 2 vial, Intramuscular, Q90 Days, David Cortez MD, 200 Units at 11/30/16 1136    onabotulinumtoxina injection 200 Units, 200 Units, Intramuscular, Q10 weeks, David Cortez MD    onabotulinumtoxina injection 200 Units, 200 Units, Intramuscular, Q90 Days, David Cortez MD    Review of Systems  Review of Systems   Constitutional: Negative for chills and fever.   Gastrointestinal: Positive for nausea and vomiting.   Neurological: Positive for dizziness, focal weakness, seizures, loss of consciousness and headaches. Negative for tingling, tremors, sensory change and speech change.   Psychiatric/Behavioral: Positive for depression and memory loss. Negative for hallucinations, substance abuse and suicidal ideas. The patient has insomnia. The patient is not nervous/anxious.        1/4/16          Objective:     Vitals:    02/21/17 0812   BP: 130/88   Pulse: 68      Physical Exam      Constitutional: obese AA female  HENT:   Head: Normocephalic and atraumatic.   Neck and spine: Normal range of motion. Neck supple. No TTP in cervical, thoracic, and lumbar spine  Cardiovascular: Normal rate, regular rhythm and normal heart sounds.    Pulmonary/Chest: Effort normal and breath sounds  normal.   Abdominal: Soft. Bowel sounds are normal.   Skin: Skin is warm.   Ext: No c/c/e noted    The patient is awake, attentive, Alert, oriented to person, place and time.  Language is intact to comprehension, repetition, and production  Able to follow multi-step commands  No findings to suggest executive dysfuntion    Patient has adequate insight    Mood is stable      Pursuits were smooth, normal saccades, no nystagmus bilaterally  PERRL, VFFC, EOMI, sensation is diminished to LT L    Motor - facial movement was symmetrical and normal, no facial droop seen.   hearing grossly intact  Palate moved well and was symmetrical with normal palatal and oral sensation  Tongue protrudes midline and movements were full      Normal tone.  No spasticity, cogwheel rigidity  Normal bulk. No pronator drift                        Right      Left  Deltoid            5          5  Biceps            5          5  Triceps           5          5  BR                  5          5                   5          5    Hip Flex            5          5  Hip Ext             5          5  Leg ABduc       5          5  Leg ADduc       5          5  Knee Flex         5          5  Knee Ext          5          5  Plantar Flexion  5          5  Dorsiflexion       5          5      No tremor noted    Reflexes normal and symmteric in bl upper and lower extremities, including biceps, triceps, and patellar    Sensory:  Light touch: diminished on L  Pinprick sensation:  diminished on L    Coordination:  F-to-N:  intact    H-to-S:  intact   Rapid-alt movements:  Normal amplitude and frequency     Romberg Sign:  negative  General gait:   Normal arm swing and turns. Normal postural reflexes.   Tandem gait:  Intact    Visuospatial/Executive  Alternating Trail Makin - correct  Cube: 0 - incorrect  Clock Contour: 1 - correct  Clock Numbers: 1 - correct  Clock Hands: 1 - correct  Naming  Lion: 1 - correct  Rhino: 1 - correct  Camel: 1 -  correct  Memory - First Trial  Face: Yes  Velvet: Yes  Spiritism: Yes  Laurie: Yes  Red: Yes  Memory - Second Trial  Face: Yes  Velvet: Yes  Spiritism: Yes  Laurie: Yes  Red: Yes  Attention  Forward Order - 2 1 8 5 4: 1 - correct  Attention - Backward Order: 1 - correct  Letters: 1 - correct  Numbers: 2 - 2 or 3 correct  Language  Repeat - Aric: 1 - correct  Repeat - Cat: 1 - correct  Fluency : 1 - correct  Abstraction  Train - Bicycle: 1 - correct  Watch-Ruler: 1 - correct  Delayed Recall  Face: 0 - incorrect  Velvet: 0 - incorrect  Spiritism: 0 - incorrect  Laurie: 0 - incorrect  Red: 0 - incorrect  Orientation  Date: 1 - correct  Month: 1 - correct  Year: 1 - correct  Day: 1 - correct  Place: 1 - correct  City: 1 - correct  Other  Add 1 point if less than or equal to 12 yr edu: 0 - incorrect  Total Score: 23 (1/4/16)                          TSH   Date/Time Value Ref Range Status   05/29/2015 12:30 PM 1.819 0.400 - 4.000 uIU/mL Final   05/07/2014 12:18 PM 1.368 0.400 - 4.000 uIU/mL Final   02/02/2014 09:26 PM 1.112 0.400 - 4.000 uIU/mL Final   02/07/2013 06:04 PM 13.210 (H) 0.4 - 4.0 uIU/ml Final   Results for PUSHPA KEY (MRN [id number]) as of 1/4/2017 09:19   Ref. Range 7/20/2015 11:06 8/19/2015 14:15   Vitamin B2 Latest Ref Range: 1 - 19 mcg/L  2   Vitamin B6 Latest Ref Range: 5 - 50 ug/L  4 (L)   Vit D, 25-Hydroxy Latest Ref Range: 30 - 96 ng/mL 13 (L)      Neuro-imaging personally reviewed    Results for orders placed or performed during the hospital encounter of 03/14/16   CT Head Without Contrast    Narrative    Procedure comment: CT examination of the head was performed from the skull base through the vertex without the use of intravenous contrast using 5-mm axial images.    Comparison: CT head 03/24/2015    Findings: There is no evidence of acute or recent major vascular territory cerebral infarction, parenchymal hemorrhage, or intra-axial mass.  There are no extra-axial masses or abnormal fluid collections.  The  ventricular system is within normal limits of size for age and shows no distortion by mass-effect or evidence of hydrocephalus.  There is no fracture or destructive osseous lesion.  The extracranial soft tissues are unremarkable.  The visualized paranasal sinuses and mastoid air cells are clear.    Impression     No acute intracranial abnormality.  ______________________________________     Electronically signed by resident: KRISSY MAYERS MD  Date:     03/14/16  Time:    17:09     ______________________________________     Electronically signed by: RAYMOND MATHEWS MD  Date:     03/14/16  Time:    17:31    Results for PUSHPA KEY (MRN [id number]) as of 1/4/2017 09:19   Ref. Range 7/20/2015 11:06   Vitamin B-12 Latest Ref Range: 210 - 950 pg/mL 383     SUMMARY AND RECOMMENDATIONS from neuropsych testing:   Ms. Key was referred for Neuropsychological Evaluation on an outpatient basis due to subjective residual memory impairment following acute cardiac arrest complicated by a stroke and closed head injury (hypoxic brain injury), all of which occurred on 2/7/2013. Her general cognitive abilities as assessed by the RBANS were in the severely impaired range, with moderately impaired language (due to reduced verbal fluency); and severely impaired immediate verbal memory, visuospatial/constructional abilities, attention, and delayed memory. Further assessment of specific cognitive abilities revealed deficits in temporal orientation, naming, verbal fluency, and facial recognition. Constructional ability was unimpaired. Additional memory assessment revealed severely impaired immediate and delayed memory. Personality test data suggested the presence of severe depression and moderate anxiety. Neuropsychological testing reveals moderate to severe impairment in memory, attention, temporal orientation, visuospatial/constructional abilities, facial recognition, and verbal fluency due to hypoxic brain injury and stroke. Naming  was variable with performances in the average and moderately impaired range. She will follow up with Juan Carlos Childers DNP in Psychiatry for depression and anxiety.    Assessment:        1. Chronic migraine without aura, with intractable migraine, so stated, with status migrainosus     2. Anoxic brain injury     3. Anoxia     4. Cognitive deficits         52 yo AAF with HIE w/ CVA (2013), Asthma, CAD, depression, HTN, PM, seizures, CVA who presents for concussion evaluation..  History is notable for PEA s/p cooling and suspected HIE and CVA in 2013 and subsequent chronic and intractable headaches.  Exam is notable for obese AAF with normal cervical ROM, CTAB, and RRR.  Neuro exam is notable for pleasant female, with diminished facial sensation on L, o/w intact CN including VFFC, normal tone and, no pronator drift, decreased sensation LUE and LLE, normal gait, negative Romberg, negative cortical signs, and MOCA 23/30 (immediate recall, visuospatial)  Workup is notable unremarkable CTH, low Vit D, low normal B12, low B6.  Neuropsych eval revealed anxiety/depression and cognitive impairments in memory, attention, temporal orientation, visuospatial/constructional abilities, facial recognition, and verbal fluency.  Pt's presentation is c/w HIE.   It is unlikely her concussion is contributing to her headaches, but other events like CVA and HIE also contributing.        Plan:       -conservative measures: cognitive rest, avoid further TBIs, abstain from EtOH  -sleep/wake cycle:   -sleep hygiene   -trazodone 100mg QHS PRN, educated about serotonin syndrome   -sleep clinic referral UARS and CPAP  -cognitive(memory losses):  SLP, repeat neuropsych test  -ADLs: OT  -headaches: per Dr. Cortez   -PT cervical ROM  -BIALA and ABIS referral        Missy Rock MD  Neurologist  [facility]     Focused examination was undertaken today.  I spent 25 minutes with the patient.  >50% of that time was spent on  counseling regarding her symptoms, review of diagnostics, and building and coordinating a treatment plan based on the combination of results and symptoms.   Questions were sought and answered to her stated verbal satisfaction.

## 2017-02-21 NOTE — MR AVS SNAPSHOT
[facility]  [address]  [facility] [address]  Phone: [phone]  Fax: [phone]                  Amna Chawla   2017 8:00 AM   Office Visit    Description:  Female : 1965   Provider:  Missy Rock MD   Department:  [facility]           Diagnoses this Visit        Comments    Chronic migraine without aura, with intractable migraine, so stated, with status migrainosus    -  Primary     Anoxic brain injury         Anoxia         Cognitive deficits         Upper airway resistance syndrome                To Do List           Future Appointments        Provider Department Dept Phone    2017 9:00 AM Mariel Miguel, PT [facility] [phone]    2017 9:45 AM ELIZABETH Vilchis, [facility] [phone]    2017 9:45 AM Matt Pugh Jr., MD [facility] [phone]    3/9/2017 1:00 PM David Cortez MD [facility] Neurology [phone]    3/16/2017 1:40 PM Richmond Toney MD [facility] Cardiology [phone]      Goals (5 Years of Data)     None      Follow-Up and Disposition     Return in about 3 months (around 2017).    Follow-up and Disposition History       These Medications        Disp Refills Start End    trazodone (DESYREL) 100 MG tablet 30 tablet 11 2017    Take 1 tablet (100 mg total) by mouth every evening. - Oral    Pharmacy: [id number] - [place], [place] - [address] AT [facility] Ph #: [phone]       ondansetron (ZOFRAN-ODT) 4 MG TbDL 8 tablet 3 2017     Take 1 tablet (4 mg total) by mouth every 24 hours as needed (nausea). - Oral    Pharmacy: [id number] - [place], [place] - [address] AT [facility] Ph #: [phone]         [facility]     [facility] Care [facility] -  Assistance  Registered nurses in the [facility] provide  clinical advisement, health education, appointment booking, and other advisory services.  Call for this free service at [phone].             Medications           Message regarding Medications     Verify the changes and/or additions to your medication regime listed below are the same as discussed with your clinician today.  If any of these changes or additions are incorrect, please notify your healthcare provider.        START taking these NEW medications        Refills    trazodone (DESYREL) 100 MG tablet 11    Sig: Take 1 tablet (100 mg total) by mouth every evening.    Class: Normal    Route: Oral    ondansetron (ZOFRAN-ODT) 4 MG TbDL 3    Sig: Take 1 tablet (4 mg total) by mouth every 24 hours as needed (nausea).    Class: Normal    Route: Oral      STOP taking these medications     butalbital-acetaminophen-caffeine -40 mg (FIORICET, ESGIC) -40 mg per tablet            Verify that the below list of medications is an accurate representation of the medications you are currently taking.  If none reported, the list may be blank. If incorrect, please contact your healthcare provider. Carry this list with you in case of emergency.           Current Medications     aripiprazole (ABILIFY) 5 MG Tab TAKE 1 TABLET(5 MG) BY MOUTH EVERY DAY    atorvastatin (LIPITOR) 40 MG tablet TAKE ONE TABLET BY MOUTH EVERY MORNING    carvedilol (COREG) 25 MG tablet TAKE 1 TABLET(25 MG) BY MOUTH TWICE DAILY WITH MEALS    chlorthalidone (HYGROTEN) 25 MG Tab TAKE 1 TABLET BY MOUTH EVERY DAY    divalproex (DEPAKOTE) 250 MG EC tablet Take 250 mg by mouth once daily.     donepezil (ARICEPT) 10 MG tablet Take 1 tablet (10 mg total) by mouth 2 (two) times daily.    duloxetine (CYMBALTA) 60 MG capsule Take 1 capsule (60 mg total) by mouth 2 (two) times daily.    gabapentin (NEURONTIN) 300 MG capsule Take 3 capsules (900 mg total) by mouth 3 (three) times daily.    lamotrigine (LAMICTAL) 25 MG tablet Take 6 tablets (150 mg total)  "by mouth once daily.    lorazepam (ATIVAN) 0.5 MG tablet Take 1 tablet (0.5 mg total) by mouth every 6 (six) hours as needed for Anxiety.    magnesium 200 mg Tab Take 200 mg by mouth once daily.    mefenamic acid 250 mg Cap TAKE 2 CAPSULES BY MOUTH TWICE DAILY AS NEEDED.    pantoprazole (PROTONIX) 40 MG tablet Take 1 tablet (40 mg total) by mouth once daily.    tiagabine (GABITRIL) 4 MG tablet Take 1 tablet (4 mg total) by mouth 2 (two) times daily as needed (headache).    tizanidine (ZANAFLEX) 4 MG tablet Take 4 mg by mouth every 6 (six) hours as needed.    VITAMIN D2 50,000 unit capsule TAKE 1 CAPSULE BY MOUTH EVERY 7 DAYS    zolpidem (AMBIEN) 10 mg Tab Take 1 tablet (10 mg total) by mouth nightly as needed.    desipramine (NORPRAMIN) 25 MG tablet Take 1 tablet (25 mg total) by mouth once daily.    ondansetron (ZOFRAN-ODT) 4 MG TbDL Take 1 tablet (4 mg total) by mouth every 24 hours as needed (nausea).    topiramate (TOPAMAX) 100 MG tablet Take 1.5 tablets (150 mg total) by mouth 2 (two) times daily.    trazodone (DESYREL) 100 MG tablet Take 1 tablet (100 mg total) by mouth every evening.           Clinical Reference Information           Your Vitals Were     BP Pulse Height Weight Last Period BMI    130/88 68 5' 4" (1.626 m) 121 kg (266 lb 12.1 oz) 01/03/2016 45.79 kg/m2      Blood Pressure          Most Recent Value    BP  130/88      Allergies as of 2/21/2017     Aspirin    Imitrex [Sumatriptan]    Nsaids (Non-steroidal Anti-inflammatory Drug)    Penicillins    Shellfish Containing Products    Percocet [Oxycodone-acetaminophen]    Reglan [Metoclopramide Hcl]      Immunizations Administered on Date of Encounter - 2/21/2017     None      Orders Placed During Today's Visit      Normal Orders This Visit    Ambulatory consult to Sleep Disorders       Instructions    -conservative measures: cognitive rest, avoid further TBIs, abstain from EtOH  -sleep/wake cycle:   -sleep hygiene   -trazodone 100mg at night as " needed    -if you have odd symptoms like sweating, stiffness, temperature or confusion stop taking and call 911   -sleep clinic referral (please schedule appointment across the [place])  -cognitive(memory losses):  Continue Speech therapy and neuropsych testing  -start occupational therapy  -continue physical therapy  -headaches: per Dr. Cortez    Sleep Hygiene:    1. Avoid watching TV, eating, and discussing emotional issues in bed. The bed should be used for sleep and sex only. If not, we can associate the bed with other activities and it often becomes difficult to fall asleep.  2. Minimize noise, light, and temperature extremes during sleep with ear plugs, window blinds, or an electric blanket or air conditioner. Even the slightest nighttime noises or luminescent lights can disrupt the quality of your sleep. Try to keep your bedroom at a comfortable temperature -- not too hot (above 75 degrees) or too cold (below 54 degrees).  3. Try not to drink fluids after 8 p.m. This may reduce awakenings due to urination.  4. Avoid naps, but if you do nap, make it no more than about 25 minutes about eight hours after you awake. But if you have problems falling asleep, then no naps for you.  5. Do not expose your self to bright light if you need to get up at night. Use a small night-light instead.  6. Nicotine is a stimulant and should be avoided particularly near bedtime and upon night awakenings. Having a smoke before bed, although it may feel relaxing, is actually putting a stimulant into your bloodstream.  7. Caffeine is also a stimulant and is present in coffee (100-200 mg), soda (50-75 mg), tea (50-75 mg), and various over-the-counter medications. Caffeine should be discontinued at least four to six hours before bedtime. If you consume large amounts of caffeine and you cut your self off too quickly, beware; you may get headaches that could keep you awake.  8. Although alcohol is a depressant and may help you fall asleep, the  subsequent metabolism that clears it from your body when you are sleeping causes a withdrawal syndrome. This withdrawal causes awakenings and is often associated with nightmares and sweats.  9. A light snack may be sleep-inducing, but a heavy meal too close to bedtime interferes with sleep. Stay away from protein and stick to carbohydrates or dairy products. Milk contains the amino acid L-tryptophan, which has been shown in research to help people go to sleep. So milk and cookies or crackers (without chocolate) may be useful and taste good as well.           Language Assistance Services     ATTENTION: Language assistance services are available, free of charge. Please call [phone].      ATENCIÓN: Si habla adebayo, tiene a turcios disposición servicios gratuitos de asistencia lingüística. Llame al [phone].     CHÚ Ý: N?u b?n nói Ti?ng Vi?t, có các d?ch v? h? tr? ngôn ng? mi?n phí dành cho b?n. G?i s? [phone].         [facility] complies with applicable Federal civil rights laws and does not discriminate on the basis of race, color, national origin, age, disability, or sex.

## 2017-02-22 ENCOUNTER — OFFICE VISIT (OUTPATIENT)
Dept: ORTHOPEDICS | Facility: CLINIC | Age: 52
End: 2017-02-22
Payer: MEDICARE

## 2017-02-22 VITALS
WEIGHT: 266.75 LBS | HEIGHT: 64 IN | HEART RATE: 68 BPM | DIASTOLIC BLOOD PRESSURE: 80 MMHG | SYSTOLIC BLOOD PRESSURE: 118 MMHG | BODY MASS INDEX: 45.54 KG/M2

## 2017-02-22 DIAGNOSIS — Z98.890 STATUS POST SURGERY: Primary | ICD-10-CM

## 2017-02-22 PROCEDURE — 99999 PR PBB SHADOW E&M-EST. PATIENT-LVL II: CPT | Mod: PBBFAC,,, | Performed by: PLASTIC SURGERY

## 2017-02-22 PROCEDURE — 99212 OFFICE O/P EST SF 10 MIN: CPT | Mod: PBBFAC | Performed by: PLASTIC SURGERY

## 2017-02-22 PROCEDURE — 99024 POSTOP FOLLOW-UP VISIT: CPT | Mod: ,,, | Performed by: PLASTIC SURGERY

## 2017-02-22 NOTE — PROGRESS NOTES
"Ms. Chawla is here today for a post-operative visit.she is 19 days status post left OCTR on 2/3/17. she reports that she is doing well.  Pain is minimal complains of mild pain at proximal wound and still noticing drainage on bandaids..  she is not taking pain medication . she denies fever, chills, and sweats since the time of the surgery.     Physical exam:    Vitals:    02/22/17 0909   BP: 118/80   Pulse: 68   Weight: 121 kg (266 lb 12.1 oz)   Height: 5' 4" (1.626 m)   PainSc:   7   PainLoc: Hand     Incision is clean and intact, mildly moist from proximal suture line, no purulence.  There is no erythema or exudate.  There is no sign of any infection. she is NVI.  Mild tenderness to palpation    Assessment: 19 days status post left open CTR     Plan:  There are no diagnoses linked to this encounter.    - remaining sutures removed today, no occulsive dressings or bandaids, moist area of proximal wound treated with silver nitrate and dead skin removed from edge of wound  - Continue Range of motion exercises, avoid heavy activities  - Tylenol 500 mg and/or Ibuprofen 400mg every 4-6 hours with food for pain as tolerated  - - Follow up: 3 weeks  "

## 2017-03-07 ENCOUNTER — TELEPHONE (OUTPATIENT)
Dept: ELECTROPHYSIOLOGY | Facility: CLINIC | Age: 52
End: 2017-03-07

## 2017-03-07 ENCOUNTER — INITIAL CONSULT (OUTPATIENT)
Dept: NEUROLOGY | Facility: CLINIC | Age: 52
End: 2017-03-07
Payer: MEDICARE

## 2017-03-07 DIAGNOSIS — G93.1 ANOXIC BRAIN INJURY: ICD-10-CM

## 2017-03-07 DIAGNOSIS — F41.9 ANXIETY: ICD-10-CM

## 2017-03-07 DIAGNOSIS — F33.9 RECURRENT MAJOR DEPRESSIVE DISORDER, REMISSION STATUS UNSPECIFIED: ICD-10-CM

## 2017-03-07 DIAGNOSIS — R41.3 MEMORY CHANGE: ICD-10-CM

## 2017-03-07 PROCEDURE — 96118 PR NEUROPSYCH TESTING BY PSYCH/PHYS: CPT | Mod: S$PBB,,, | Performed by: PSYCHIATRY & NEUROLOGY

## 2017-03-07 PROCEDURE — 96118 PR NEUROPSYCH TESTING BY PSYCH/PHYS: CPT | Mod: PBBFAC | Performed by: PSYCHIATRY & NEUROLOGY

## 2017-03-07 PROCEDURE — 90791 PSYCH DIAGNOSTIC EVALUATION: CPT | Mod: PBBFAC | Performed by: PSYCHIATRY & NEUROLOGY

## 2017-03-07 PROCEDURE — 99499 UNLISTED E&M SERVICE: CPT | Mod: S$PBB,,, | Performed by: PSYCHIATRY & NEUROLOGY

## 2017-03-07 PROCEDURE — 96119 PR NEUROPSYCH TESTING BY TECHNICIAN: CPT | Mod: PBBFAC | Performed by: PSYCHIATRY & NEUROLOGY

## 2017-03-07 PROCEDURE — 90791 PSYCH DIAGNOSTIC EVALUATION: CPT | Mod: S$PBB,,, | Performed by: PSYCHIATRY & NEUROLOGY

## 2017-03-07 PROCEDURE — 96119 PR NEUROPSYCH TESTING BY TECHNICIAN: CPT | Mod: 59,S$PBB,, | Performed by: PSYCHIATRY & NEUROLOGY

## 2017-03-07 NOTE — TELEPHONE ENCOUNTER
Patient contacted the [facility] on this afternoon with c/o of palpitations and feeling her heart beating fast. Patient is without any shortness of breath, states is compliant with her medications and has not had any changes with her medications.  Patient instructed to send a manual transmission from her Remote ICD home monitor as to assess for any recorded arrhythmias.  Patient stated she was currently on her way home and she will send the transmission as soon as she gets home.  Patient instructed to contact the clinic upon completion of the transmission for further instructions if needed.  Patient verbalized understanding to all of the above.

## 2017-03-08 ENCOUNTER — CLINICAL SUPPORT (OUTPATIENT)
Dept: ELECTROPHYSIOLOGY | Facility: CLINIC | Age: 52
End: 2017-03-08
Payer: MEDICARE

## 2017-03-08 DIAGNOSIS — I44.30 ATRIOVENTRICULAR BLOCK: ICD-10-CM

## 2017-03-08 DIAGNOSIS — Z95.810 AUTOMATIC IMPLANTABLE CARDIOVERTER-DEFIBRILLATOR IN SITU: ICD-10-CM

## 2017-03-08 DIAGNOSIS — Z86.74 PERSONAL HISTORY OF SUDDEN CARDIAC ARREST: ICD-10-CM

## 2017-03-08 PROCEDURE — 93295 DEV INTERROG REMOTE 1/2/MLT: CPT | Mod: ,,, | Performed by: INTERNAL MEDICINE

## 2017-03-08 PROCEDURE — 93296 REM INTERROG EVL PM/IDS: CPT | Mod: PBBFAC | Performed by: INTERNAL MEDICINE

## 2017-03-08 NOTE — PROGRESS NOTES
NEUROPSYCHOLOGICAL EVALUATION - CONFIDENTIAL    REFERRAL SOURCE: Missy Rock MD  MEDICAL NECESSITY:  Re-evaluate cognitive functioning to rule out impairment and for the purposes of differential diagnosis.   DATE CONDUCTED: 3/7/2017    SOURCES OF INFORMATION:  The following was gathered from a clinical interview with Ms. Amna Chawla and review of the available medical records. Ms. Chawla expressed an understanding of the purpose of the evaluation and consented to all procedures. Total licensed billing psychologists professional time including clinical interview, test administration and interpretation of tests administered by the billing psychologist, integration of test results and other clinical data, preparing the final report, and personally reporting results to the patient  [id number] - 1 hours  [id number] - 2 hours  [id number] - 3 hours    INTERVAL HISTORY: Ms. Chawla was previously evaluated in December, 2015. Her detailed history is included below. Her functioning has gradually improved since the previous evaluation. She is more proactive about writing down important information and planning her day. For instance, she can forget to do a task or lose her train of thought if she becomes distracted by something else. As a result, she tries to focus on completing 1 task at a time or she will write a reminder note for what she wants to tell her sister later. She acknowledged that she doesnt religiously employ these compensatory strategies, but there is at least marked improvement over the past year. Ms. Chawla continues to burn food if she leaves the kitchen to see what is on television or if distracted by another task. She has not caused any significant house fires. She admits that she doesnt set alarms/timers while cooking. She receives a great deal of support from family members. They fill her pillbox and will remind her to take the medications if she misses a dose. She is very compliant with the morning and  afternoon dose, but can forget the evening dose. Fortunately, her family members will even wake her up to take them if she forgets. She is still not driving. Her daughter puts Ms. Meyer medical appointments on the calendar function on her phone.     Ms. Meyer mood has also improved over the past year, but she continues to struggle with depression and emotional lability. She was very depressed after her daughter moved about 6 months ago, but this has gradually improved as she has adapted to the change. She is still prone to episodes of depression lasting 1-2 days, highlighted by dysphoria, anhedonia, and amotivation. In fact, she does not leave her bed on these days. While these days are on the extreme end, Ms. Chawla generally struggles with reduced motivation and anhedonia. She used to enjoy reading, but finds that she just cant get into books. She was also loved decorating her house and making wreathes, but cant get herself to do it without constant motivation from family members. For instance, her daughter was able to encourage her to make a few wreathes during the holidays, but she cant find the motivation on her own. She denied suicidal ideation, plan, or intent.     In addition to depression, Ms. Chawla described herself as highly emotionally labile. She has a shorter temper since the medical event in 2013. She especially struggles in larger crowds, which she attributes to excessive external stimuli. Similarly, she has a difficult time tracking different conversations between multiple people. She would rather remove herself than be in these situations. While she is interested in potentially returning to work on a part time basis or volunteering, she worries that depression and emotional lability would cause considerable problems. For instance, Ms. Chawla would like to work with children, but she doesnt want to lash out a child if she loses her temper. Similarly, she would hate to a miss a  volunteer event because of her depression, which would make her feel even worse for letting children down. As a result, she feels it is still not the best time to volunteer. She continues to be followed by Juan Carlos Childers NP in psychiatry, which has been beneficial. She especially feels that Cymbalta has improved her mood. She very briefly attended group therapy, but did not find it beneficial. Individual therapy has also been recommended, but it appears that she did not make it to a scheduled visit.     Ms. Meyer medical status has not changed since the previous evaluation. She continues to struggle with frequent migraine headaches, but they have improved with quarterly BOTOX injections. The headaches tend to be worse the closer she gets to the quarterly injection. She has occasional problems with sleep initiation, with benefit from Ambien 2-3 days per week. She denied problems with sleep maintenance. She is not sleepy during the day and does not nap, even when she doesnt leave her bed due to depression.     Current medications include:   aripiprazole (ABILIFY) 5 MG Tab     atorvastatin (LIPITOR) 40 MG tablet     carvedilol (COREG) 25 MG tablet     chlorthalidone (HYGROTEN) 25 MG Tab     desipramine (NORPRAMIN) 25 MG tablet ()      divalproex (DEPAKOTE) 250 MG EC tablet      donepezil (ARICEPT) 10 MG tablet      duloxetine (CYMBALTA) 60 MG capsule     gabapentin (NEURONTIN) 300 MG capsule     lamotrigine (LAMICTAL) 25 MG tablet     lorazepam (ATIVAN) 0.5 MG tablet      magnesium 200 mg Tab      mefenamic acid 250 mg Cap      ondansetron (ZOFRAN-ODT) 4 MG TbDL      pantoprazole (PROTONIX) 40 MG tablet     tiagabine (GABITRIL) 4 MG tablet      tizanidine (ZANAFLEX) 4 MG tablet     topiramate (TOPAMAX) 100 MG tablet ()     trazodone (DESYREL) 100 MG tablet      VITAMIN D2 50,000 unit capsule     zolpidem (AMBIEN) 10 mg Tab      From 2015:  HISTORY OF PRESENT ILLNESS : Ms. Amna Chawla is a  50-year-old, right-handed,  female with 13 years of education who was referred for an evaluation of her cognitive functioning due to a self-reported decline in memory and daily functioning since a serious medical event in February, 2013. Her medical history was fairly unremarkable (hypertension) prior to suddenly passing out and becoming unresponsive while at work. She was given CPR in the field for 8 minutes until EMS arrived. She was found to be in cardiac arrest, was given epinephrine, shocked 1 time, and converted to sinus tachycardia. She subsequently had a pacemaker/AICD placed. Ms. Chawla was believed to have suffered an anoxic injury as her early neurological exams revealed no objective cortical or brainstem responses. A head CT was unremarkable. Her condition gradually improved as she had brainstem reflexes the following day with suggestion of seizure activity on EEG. Her mental status continued to gradually improve while she was in the hospital. She was discharged after several weeks and followed up with Dr. Paredes in April, 2013. Her exam was notable for persistent mild left hemiparesis. She was fully oriented, but complained of memory loss at that time. She underwent a neuropsychological evaluation with Dr. Ray in October, 2013. Impressions offered from that evaluation included: Neuropsychological testing reveals moderate to severe impairment in memory, attention, temporal orientation, visuospatial/constructional abilities, facial recognition, and verbal fluency due to hypoxic brain injury and stroke. Naming was variable with performances in the average and moderately impaired range. She will follow up with Juan Carlos Childers DNP in Psychiatry for depression and anxiety.    Ms. Chawla reported multiple changes in her physical, cognitive, emotional, and daily functioning since the aforementioned event (which she refers to as when I dropped dead). She complains of daily headaches  which she continues to feel are poorly controlled and consistently rates the pain as 10/10. She endorsed persistent mild left side weakness. She does not believe her memory has improved since the hospitalization, but she also does not think it has progressively worsened. She continues to report instances of burning food on the stove and forgetting to turn off a faucet. She also easily loses her train of thought in conversation or when reading. She endorsed word finding problems, which she labeled as disgusting. Ms. Chawla described her life as perfect prior to February, 2013. She was working, happy, and denied any prior psychiatric history. However, she admits to feeling depressed since that time. She is no longer working, relies on her daughter and other family members for care, and generally feels like a burden. She now performs very few activities of daily living and has also been hesitant to employ simple compensatory strategies for the few responsibilities she does have. For instance, she has a calendar, but does not use it for track appointments. She also does not write down important information, even though she knows she will forget it at a later time. Ms. Meyer daughter manages her medications and finances and also gives her medication reminders. She has not driven since 2013.    MEDICAL HISTORY: In addition to above, Ms. Meyer history is remarkable for medication controlled hypertension and hyperlipidemia. Her most recent head CT in March, 2015 was unremarkable. She reported an intermittent tremor, which Dr. Paredes noted may be secondary to medications. The tremor has also been described as functional in her medical record. She reported several seizure-like episodes. She was evaluated by Dr. Momin in June, 2015, who noted that the description was atypical for seizure and EEG showed no evidence for cortical dysfunction or an epileptic process. Ms. Chawla reported difficulties with sleep  maintenance over the past 2 years. She estimated sleeping about 4 hours per night, but does not feel sleepy during the daytime. She also does not nap. Ms. Chawla denied a history of tobacco use. She does not drink alcohol and she denied a history of substance abuse.     PSYCHIATRIC HISTORY: As noted, Ms. Chawla strongly denied a pre-morbid psychiatric history, indicating that her life was perfect. However, she reported persistent symptoms of depression since , including increased tearfulness, with pronounced anhedonia and amotivation. She is far less interested in socializing than in the past and spends her time either at home or at doctors appointments. Otherwise, she would prefer to be left alone as she is quick to snap at people. Ms. Chawla denied a history of suicidal ideation. She also denied a history of physical/sexual abuse.     FAMILY HISTORY: Ms. Meyer mother  with COPD in her late 50s. Her father  from natural causes in his late 60s. She believes one relative may have had dementia. She has 1 brother, who has MS. She has 2 daughters, ages 28 and 8.      PSYCHOSOCIAL HISTORY: Ms. Chawla is a high school graduate who denied problems with learning throughout her education. She completed several years at different colleges, without obtaining a degree. She did obtain a license in [employer]. 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 She has not returned to work since that time and is currently receiving Social Security Disability benefits. Ms. Chawla has never been .     End of  History    3/2017 Results/Impressions:  BEHAVIORAL OBSERVATIONS: Ms. Chawla arrived on time to the evaluation and was unaccompanied. She was appropriately dressed and groomed. She wore eyeglasses to correct for vision and ambulated without an assistive device. Expressive and receptive  language were grossly intact. Speech was of normal rate, volume, and prosody. No word finding problems were noted. She demonstrated intact episodic memory for recent events. She also demonstrated intact prospective memory for future appointments. Mood was generally euthymic, although she became tearful when discussing some of her current stressors.     Ms. Chawla indicated that her family members reminded her of the 2015 evaluation before this appointment in an attempt to motivate her. She was unable to provide consistent effort on the previous evaluation as she was very prone to self-criticism. She would become frustrated with herself very quickly and would not persist on the tasks. As a result, she was very mindful about managing her emotions during the present evaluation and seemed to do so very admirably.    TEST RESULTS: Scores are included in an appendix to this report.      Ms. Chawla was fully oriented to time and place. Her total score of 28/30 on the MoCA was within normal limits. She encoded 5/5 words after 2 trials, freely recalling 3/5 following a brief delay. She correctly identified 1 of the remaining 2 words with multiple choice cueing. She made 1 error on a serial 7 subtraction task (80, 73, 66, 59, 52, 46). She accurately lawrence the face of a clock and correctly set the clock hands at the designated time.     Baseline abilities were previously estimated to be in the low average range. She demonstrated average auditory attention/initial encoding, working memory, processing speed, semantic verbal fluency, confrontation naming, complex/divided attention, and visuospatial abilities. Letter verbal fluency was below average to average. Ms. Chawlas encoding of short stories below average to average. She demonstrated intact retention following long delays and her overall recall was average. Mild difficulties with cognitive organization and source memory were noted. Learning/encoding of supraspan word  lists was consistently average. Recall was below average for the list that she was unable to group into semantic categories. Still, her performance was within normal limits on the recognition portion of the test.     Ms. Meyer responses on a self-report inventory were suggestive of a moderate degree of depression. She reported significant anhedonia, guilt, agitation/restlessness, and concentration difficulties. She denied thoughts of suicide.     SUMMARY AND IMPRESSIONS: Ms. Amna Chawla is a 51-year-old female who was referred for a re-evaluation of her cognitive functioning. She was previously evaluated in 2013 (by Dr. House) and 2015 (myself). The 2015 evaluation was compromised by difficulties fully engaging in the evaluation, which was believed secondary to depression.    Mr. Meyer neuropsychological test scores represent a SIGNIFICANT IMPROVEMENT from the previous evaluations. In fact, she performed in the average range across nearly all domains of cognitive functioning. Mild executive dysfunction was noted, highlighted by difficulties with cognitive organization/retrieval and letter verbal fluency. Otherwise, her neuropsychological profile was entirely within normal limits and not even at the level to warrant a cognitive diagnosis, which is a considerable departure from her impaired performance in 2013 and even 2015. There are multiple likely contributing factors to her improved performance, including further recovery/adjustment from the 2013 medical event, improved sleep, improved headache management, and practice effects. It is opined that improved mood/frustration tolerance also significantly contributed to the marked discrepancy between the evaluation.        Consistent with these findings, Ms. Meyer functioning also appears to have at least mildly improved over the past year. She has been more proactive about employing compensatory strategies to improve her level of independence, such as  writing down important information, planning ahead of time, and trying to stay as organized as possible. She still experiences emotional lability and depressive episodes, but she is more aware of these instances and proactive about trying to manage them. It is also encouraging that she has considerable family support/encouragement when she struggling to manage these overwhelming feelings. Overall, the present findings and improvement in her functioning is very encouraging. Several recommendations are offered below to continue to support her improvements in daily functioning.    RECOMMENDATIONS:    1. Psychotherapy - Ms. Chawla continues to meet criteria for clinically significant depression, highlighted by dysphoria, anhedonia, and amotivation. She seems to benefit when provided consistent structure and encouragement from family members. Therapy could also provide her a consistent, structured environment for goal setting and continued development/implementation of stress management strategies. The goal would be to continue to promote her improvements in daily function. Perhaps the next goal would be to re-engage in previously enjoyed activities, such as reading or wreath making. Continue to follow Juan Carlos Childers NP in psychiatry is also strongly encouraged.     2. Compensatory Mechanisms - Continue to write down important information, write lists before going to the grocery, and begin setting alarms/timers when cooking regardless of length of cooking time. She has been very proactive with implementing these strategies over the past year and it will be important to keep them as part of her daily routine.    3. Optimize Brain Health/Manage Vascular Risk Factors - Remain physically active, socially active, and maintain a heart healthy diet (such as the Mediterranean diet).     4. Follow-up - 1-2 years to assess for interval change.     I will provide Ms. Chawla and her family the results of the evaluation.       Thank you for allowing me to participate in Ms. Meyer care.  If you have any questions, please contact me at [phone].    Gigi Rivas Psy.D., ABPP  Board Certified in Clinical Neuropsychology  [facility] - Department of Neurology    APPENDIX  TESTS ADMINISTERED:  Clinical Interview and Review of Records, Jose Cognitive Assessment (MoCA), Repeatable Battery for the Assessment of Neuropsychological Status (RBANS, form A), select subtests from the Wechsler Adult Intelligence Scale - 4th Edition (WAIS-IV), Logical Memory subtest form the Wechsler Memory Scale - 4th Edition (WMS-IV), Manzanares Verbal Learning Test - Revised (HVLT-R, form 1), Renan Complex Figure (copy trial only), Trailmaking Test Part A and B, Controlled Oral Word Association Test (COWAT), Semantic Verbal Fluency (Animals), Naming Test from the NAB (form 2), Symbol Digit Modalities Test (SDMT), and the Phillips Depression Inventory - second edition (BDI-II).         2017 2015 2013    MoCA    28/30    TOPF       Standard Score  + simple demographics     74 (pred = 89)    RBANS   Index  Immediate Memory  85      57    Visuospatial/Construction 116      60  Language   102      72  Attention   103      56  Delayed Memory  97      44  Total    10      51    Subtests   Scaled Scores  List learning   9  Story Memory   6  Figure Copy   13  Line Orientation  51-75%  Naming   51-75%  Fluency   11  Digit Span   11  Coding    10  List Recall   17-25%  List Recognition  26-50%  Story Recall   9  Figure Recall   9    WAIS-IV      Individual subtests  Scaled Score   Digit Span   11   8   LDF   8   7 (raw)   LDB   6   6 (raw)   LDS   5   4 (raw)  RDS   11   8      WMS-IV  LMI    10  LMII    10  Recognition   17-25%    CVLT-II      Z/T scores  T1-5       34  T1       -1.0  T2       -1.5  T3       -1.5  T4       -2.0  T5       -1.5  List B       -1.5  SDFR       -1.0  SDCR       -1.5  LDFR       -2.0  LDCR       -2.0  Total Recog. Disc.     -2.5  (9 hits, 6 fps)  FC       15/16    HVLT-R   T-Score  T1    5 (raw)  T2    12 (raw)  T3    11 (raw)  Total    50  DR    55 (raw = 11)  Retention   49  Recognition   45 (11 hits, 1 fp)    Renan Complex Figure  Percentile  Copy    >16% (33/36)  <1% (27/36)   Time    >16% (133 seconds) 2-5% (370 seconds)                             [facility] Norms   T-Score  Trails A   34   46   Trails B   47   43 (1 error)  FAS    42   38  Animal fluency   51   43    NAB    T-Score  Naming   44 (29/31)    SDMT    Z-Score  Written    .20  Oral    .08    WMT  IR       45  DR       42.5  CNS       57.5  MC       25  PA       25  FR       17.5           BDI-II    24      34

## 2017-03-09 ENCOUNTER — TELEPHONE (OUTPATIENT)
Dept: ELECTROPHYSIOLOGY | Facility: CLINIC | Age: 52
End: 2017-03-09

## 2017-03-09 ENCOUNTER — PROCEDURE VISIT (OUTPATIENT)
Dept: NEUROLOGY | Facility: CLINIC | Age: 52
End: 2017-03-09
Payer: MEDICARE

## 2017-03-09 VITALS
DIASTOLIC BLOOD PRESSURE: 83 MMHG | SYSTOLIC BLOOD PRESSURE: 143 MMHG | WEIGHT: 269.19 LBS | HEART RATE: 77 BPM | HEIGHT: 64 IN | BODY MASS INDEX: 45.96 KG/M2

## 2017-03-09 DIAGNOSIS — G50.0 SUPRAORBITAL NEURALGIA: ICD-10-CM

## 2017-03-09 DIAGNOSIS — R29.898 DECREASED ROM OF NECK: Chronic | ICD-10-CM

## 2017-03-09 DIAGNOSIS — I48.0 PAROXYSMAL ATRIAL FIBRILLATION: ICD-10-CM

## 2017-03-09 DIAGNOSIS — G43.711 CHRONIC MIGRAINE WITHOUT AURA, WITH INTRACTABLE MIGRAINE, SO STATED, WITH STATUS MIGRAINOSUS: Primary | ICD-10-CM

## 2017-03-09 DIAGNOSIS — D51.8 OTHER VITAMIN B12 DEFICIENCY ANEMIA: ICD-10-CM

## 2017-03-09 DIAGNOSIS — E55.9 VITAMIN D DEFICIENCY: ICD-10-CM

## 2017-03-09 PROCEDURE — 64615 CHEMODENERV MUSC MIGRAINE: CPT | Mod: S$PBB,,, | Performed by: PSYCHIATRY & NEUROLOGY

## 2017-03-09 PROCEDURE — 64615 CHEMODENERV MUSC MIGRAINE: CPT | Mod: PBBFAC | Performed by: PSYCHIATRY & NEUROLOGY

## 2017-03-09 RX ORDER — LAMOTRIGINE 200 MG/1
200 TABLET ORAL DAILY
Qty: 30 TABLET | Refills: 11 | Status: SHIPPED | OUTPATIENT
Start: 2017-03-09 | End: 2017-06-01

## 2017-03-09 RX ORDER — WARFARIN SODIUM 5 MG/1
5 TABLET ORAL DAILY
Qty: 30 TABLET | Refills: 11 | Status: SHIPPED | OUTPATIENT
Start: 2017-03-09 | End: 2017-04-25 | Stop reason: SDUPTHER

## 2017-03-09 RX ADMIN — ONABOTULINUMTOXINA 200 UNITS: 100 INJECTION, POWDER, LYOPHILIZED, FOR SOLUTION INTRADERMAL; INTRAMUSCULAR at 01:03

## 2017-03-09 NOTE — PROCEDURES
Follow up:   4 weeks ago BOTOX effect wore off   She reports severe daily HA    During BOTOX periods she feels she  her HA. Much less intense     Prior note:   The patient is a 51-year-old female who [facility]. Since her last visit, she reports growing frustration   about the fact that nothing has helped her with regards to her headaches. She   continues to experience daily headaches. She takes mefenamic acid and   tizanidine every night, which only provides her two hours of relief. She is   unable to sleep because of the refractory headaches. She is incapacitated   because of severe headaches. She reports minimal relief with infusions that she  gets on a periodic basis. She does report an improvement overall with the   Botox. However, this effect has worn off in the last two weeks. She also   reports an episode wherein she fell with loss of consciousness, which was not   provoked. As a result, she injured her ankle and is currently wearing a boot.      Prior note:   since last week - Reports vertigo for 6 - 7 minutes upto 3 times daily   Nearly daily severe HA - no response to ponstel , compazine   She is late for her BOTOX - last 2 weeks were painful       Follow up:   R sided Headache is constant max at TMJ on R   Prior note:   She reports new episodes of R face tingling. Sh also reports 2 episodes of blurred vision lasting 15 minutes. These hapended while watching TV. Her pain remains unchanged   Follow up:   2 days of severe HA during Thanksgiving   Pounding bifrontal region   Pain worse over L eye brow   Follow up:   Reports bifrontal severe HA (worse on L) with no nausea with sudden onset . Occurs daily   NO note:  I found no papilledema or other changes to suggest elevated intracranial pressure or other alternative etiology for her headaches. Repeat exam in two years.  HA are less frequent and less intense.   (R levator scapula spasm)   symptoms  "better with lateral flexion to L   Prior note:   She still has daily HA with severe sharp stabbing pain behind L eye lasting for 15 sec   Prior note:   Daily pain. 2/week - nausea.  Shu Lares RN at 9/17/2014 4:53 PM  Pt presented to clinic for medical advice. Pt is seen by Dr. Paredes and Dr. Cortez.  1) She went ER on 9/13/14 for a migraine. She was given Reglan, Benadryl, MSO4 and IVF. A couple days later she developed an all over body "tremor". She states "I think I have Parkinson's". - Per Dr. Paredes, medication related tremor (Reglan & Benadryl). Informed her it should wear off in a few days. If not, she is to call and let us know.  2) Headaches - triggered by insomnia.   Current meds:  Ketoprofen - she took it once and said her "throat closed up" and she's not supposed to have anything with aspirin in it.  Nortriptyline - she was taking it at night, but it didn't help her sleep, so she stopped it.  Per Dr. Cortez, discontinue Ketoprofen and Nortriptyline. Start Effexor XR 37.5mg QD, tizanidine 4mg BID PRN headache and Klonopin 0.25 - 0.5mg QHS PRN. Will schedule pt for sphenocath procedure within the next week.   Prior note:   48 yo woman with history of HTN and asthma, both reportedly controlled, in hospital early 2013 after "passed out" and became unresponsive. CPR in the field for 8 minutes until EMS arrived. Per ED note, on arrival she was found to be in PEA, given epinephrine. Vfib/Vtach was achieved which was shocked x1. Patient converted to sinus tachycardia after shock. I do not know the time course for the above treatment. On arrival to facility patient was in sinus tachycardia with strong pulses, intubated and unresponsive. ET tube placement was confirmed with direct laryngoscopy and good bilateral breath sounds were heard after the tube was pulled back 2 cm. Reported to have "withdrawal" movements in ER during stabilization, then sedated and cooling protocol initiated. Had remarkable " "  recovery and first seen in clinic in 2013.   Headache history: cluster   Age of onset -    Location - behind L eye   Nature of pain - sharp   Prodrome - no   Aura - No   Duration of headache - 6-7 min   Time to peak intensity -   Pain scale - range of intensity - 9/10   Frequency - daily   Status Migrainosus history - 1-3 days   Time of day of most headaches- anytime   Associated symptoms with the headache:   Red eyes   swelling of L face   Headache history: Migraine   Age of onset -    Location - bifrontal   Nature of pain - Pounding   Prodrome - no   Aura - No   Duration of headache - > 4 hrs   Time to peak intensity -   Pain scale - range of intensity - 9/10   Frequency - 5/week   Status Migrainosus history - 1-3 days   Time of day of most headaches- anytime   Associated symptoms with the headache:   Meningeal symptoms - photophobia, phonophobia, exercise intolerance +   Nausea/vomitting +   Nasal drainage   Visual blurriness +   Pallor/flushing   Dizziness   Vertigo   Confusion   Impaired concentration +   Pain worsened with physical activity +   Neck pain +   Symptoms of increased intracranial pressure:   Whooshing sounds - no   Visual spots/blotches - no   Headache Triggers: unknown   Other comorbid conditions:   Anxiety - no   Motion sickness symptom - no       Treatment history:   Resolution of headache with sleep - yes       Meds tried:   Trigger points involvin/9/15 helped for 1 day   Site: Right   Levator scapula   Pharmacological agent:   0.5% Sensorcaine solution   No complications were noted.  Supraorbital block - helped for 2 days   Tylenol - not help   imitrex - chest tightness   alleve - help   topamax - not helping   Neurontin - not helping   Paxil, Elavil - not help   seroquel - nightmare   Ketoprofen - she took it once and said her "throat closed up" and she's not supposed to have anything with aspirin in it.  Nortriptyline - she was taking it at night, but it didn't help her " sleep, so she stopped it.  reglan - parkinsonism   zanaflex - help   Toradol 30 mg IM inj at home - too expensive   BOTOX round #1 9/3/15 - helped (R levator scapula spasm)   Round #2 12/3/15 - helped   Round#3 3/316 - helped   Round #4 6/1/16 - helped   BOTOX#5 9/15/16 - helped    BOTOX#6 - 11/30/16 - helped     Can not do RFA - pt has pacemaker   Procedure: IOVERA peripheral nerve focus cold therapy (non ablative cryotherapy) 12/30/15 - not help   Indication: Cryotherapy of select peripheral nerves of the head was performed to treat chronic headaches that failed to respond to several preventive pharmacological therapies.   Sedation: None   Informed consent: The procedure, risks, benefits and options were discussed with the patient. There are no contraindications to the procedure. The patient expressed understanding and agreed to the procedure. I verify that I personally obtained the patient's consent prior to the start of the procedure.  Location:  Bilateral Supra orbital nerve (3 cycles on R and 1 cycle on L)   Bilateral Occipital nerve   3 cycles   Pain relief: Pain score reduced 10/10->0/10   9/26 Bilateral Sphenopalatine Ganglion and bilateral Maxillary Branch (Trigeminal Nerve) Block - no help   ONB - not help                                            Shannon Pagan RN at 12/31/2014 3:54 PM       Status: Signed                   Expand All Collapse All   HEADACHE FASTTRACK  PAIN 7/10 NAUSEA 0/10  ROUND 1: TORADOL, IMITREX, MORPHINE, BENADRYL, AND MAGNESIUM  PAIN 7/10 NAUSEA 0/10  PT STATED SHE WAS READY TO GO HOME AND GO TO SLEEP. PT D/C'ED IN NAD            Shannon Pagan RN at 10/5/2015 12:49 PM       Status: Signed                   Expand All Collapse All   HEADACHE FASTTRACK  PAIN 8/10 NAUSEA 10/10  FIRST ROUND: tORADOL, BENADRYL, COMPAZINE, IMITREX, MAGNESIUM, AND VALPROATE.  PAIN 1/10 NAUSEA 0/10  PT READY TO GO HOME AND FEELING BETTER. PT LEFT IN NAD WITH FAMILY MEMBER  TOTAL TIME: 5265-1433        Shannon Pagan RN at 10/20/2015 3:05 PM       Status: Signed                   Expand All Collapse All   HEADACHE FASTTRACK  PAIN 10/10 NAUSEA 0/10  FIRST ROUND: tORADOL, BENADRYL, COMPAZINE, IMITREX, MAGNESIUM, AND VALPROATE.  BP BEING MONITORED EVERY 15 MIN AND REMAINED 170'S/80-90'S. DR CHOPRA NOTIFIED AND STATED TO MAKE SURE BP DID NOT EXCEED 180 SYSTOLIC OR PT SHOULD REPORT TO ED. BP AT 1500 183/92. DR CHOPRA NOTIFIED AND GAVE ORDER TO STOP INFUSION AND SEND PATIENT TO ED. PT BROUGHT TO ED BY INFUSION NURSE VIA WHEELCHAIR.   PAIN 8/10 NAUSEA 0/10  TOTAL TIME: 5148-0839               Progress Notes                           Shannon Pagan RN at 2/24/2016 1:36 PM       Status: Signed                  Expand All Collapse All   HEADACHE FASTTRACK  PAIN 10/10 NAUSEA 7/10  FIRST ROUND: tORADOL, BENADRYL, AND MORPHINE-BP RANGING FROM 177-203/84-92, HR 60-82  MD NOTIFIED AND GAVE ORDERS TO SEND TO ED. PT LEFT VIA W/C WITH INFUSION NURSE ON WAY TO ED.            Headache risk factors:   H/o TBI - [facility] (2013) + neck sprain   H/o Meningitis - no   F/h Aneurysms - no       Review of Systems   Constitutional: Negative.   HENT: Negative.   Eyes: Negative.   Respiratory: Negative.   Cardiovascular: Negative.   Gastrointestinal: Negative.   Endocrine: Negative.   Genitourinary: Negative.   Musculoskeletal: Negative.   Skin: Negative.   Allergic/Immunologic: Negative.   Hematological: Negative.   Psychiatric/Behavioral: insomnia      Objective:     Physical Exam   Constitutional: She is oriented to person, place, and time. She appears well-developed.   obesity   HENT:   Head: Normocephalic and atraumatic.   Right Ear: External ear normal.   Left Ear: External ear normal.   Nose: Nose normal.   Mouth/Throat: Oropharynx is clear and moist.   Eyes: Conjunctivae and EOM are normal. Pupils are equal, round, and reactive to light.   Neck: Normal range of motion.   Cardiovascular: Regular rhythm.   Pulmonary/Chest: Effort normal and breath  sounds normal.   Musculoskeletal: Normal range of motion.   Neurological: She is alert and oriented to person, place, and time. She has normal reflexes. She displays normal reflexes. No cranial nerve deficit. She exhibits normal muscle tone. Coordination normal. Uses cane.   Ataxic gait - wide based   Skin: Skin is warm and dry.   Psychiatric: She has a normal mood and flat affect. Her behavior is normal. Judgment and thought content normal.   Headache Exam:  Optic disc is flat OU   Temples appear symmetric  No V1,V2,V3 distribution allodynia/hyperalgesia catalina   No facial swelling  No gross TMJ abnormalities   No ptosis   No conj injection   No meningismus   No neck stiffness  No C spine tenderness  Cervical facet tenderness on palpation catalina   No tender points over trapezium   catalina supraorbital nerve exit point tenderness  catalina occipital nerve exit point tenderness  No auriculotemporal nerve tenderness   Tenderness of levator scapula on R          Assessment:     1.  Occipital neuralgia     2.  Cervicalgia     3.  4  5  6 Chronic migraine without aura, with intractable migraine, so stated, with status migrainosus (post traumatic) post concussive?  Depression   TMJ - R sided   Insomnia       Head CT  Findings: There is no evidence of acute or recent major vascular territory cerebral infarction, parenchymal hemorrhage, or intra-axial mass.  There are no extra-axial masses or abnormal fluid collections.  The ventricular system is within normal limits of size for age and shows no distortion by mass-effect or evidence of hydrocephalus.  There is no fracture or destructive osseous lesion.  The extracranial soft tissues are unremarkable.  The visualized paranasal sinuses and mastoid air cells are clear.   Impression    No acute intracranial abnormality.  ______________________________________     Electronically signed by resident: KRISSY MAYERS MD  Date: 03/14/16  Time: 17:09            Results for PUSHPA KEY (MRN  [id number]) as of 9/3/2015 14:26     Ref. Range  7/20/2015 11:06  8/19/2015 14:15    Vitamin B-12  Latest Range: 210-950 pg/mL  383       Retinol, serum  Latest Range:  ug/dL     52    Vitamin B2   Latest Range: 1-19 mcg/L      2     Vitamin B6   Latest Range: 5-50 ug/L      4 (L)     Vit D, 25-Hydroxy   Latest Range: 30-96 ng/mL   13 (L)           Plan:     1. Prophylactic medication -   Despiramine 25 mg AM   Cymbalta 120 mg daily   Aricept 20 mg daily   Neurontin 900 mg TID   Magnesium 200 mg daily  Zanaflex 8 mg PRN   Topamax 150 mg BID   Cont B2, B6 supplements   Lamictal  200 mg daily   2, Breakthrough headache - tylenol, zanaflex 8 mg, Gabitril 8 mg, ponstel   PRN percocet   Multiple alternative treatment options were offered to the patient   3. Refrain from over counter pain medications. Discussed medication overuse headache.cont Magnesium 400 mg PO BID   4. Occipital neuralgia - If medical management is ineffective may consider occipital nerve blocks.   5. I again urged the patient to keep a headache calender.    f/up Dr. Rock for TBI    check vitamins again     BOTOX was performed as an indicated therapy for intractable chronic migraine headaches given that the patient failed > 5 prophylactic medications  Botulinum Toxin Injection Procedure   Pre-operative diagnosis: Chronic migraine   Post-operative diagnosis: Same   Procedure: Chemical neurolysis   After risks and benefits were explained including bleeding, infection, worsening of pain, damage to the areas being injected, weakness of muscles, loss of muscle control, dysphagia if injecting the head or neck, facial droop if injecting the facial area, painful injection, allergic or other reaction to the medications being injected, and the failure of the procedure to help the problem, a signed consent was obtained.   The patient was placed in a comfortable area and the sites to be treated were identified.The area to be treated was prepped three times  with alcohol and the alcohol allowed to dry. Next, a 30 gauge needle was used to inject the medication in the area to be treated.   Area(s) injected:   Total Botox used: 175 Units   Botox wastage: 25 Units   Injection sites:    muscle bilaterally ( a total of 10 units divided into 2 sites)   Procerus muscle (5 units)   Frontalis muscle bilaterally (a total of 20 units divided into 4 sites)   Temporalis muscle bilaterally (a total of 40 units divided into 8 sites)   Occipitalis muscle bilaterally (a total of 30 units divided into 6 sites)   Cervical paraspinal muscles (a total of 20 units divided into 4 sites)   Trapezius muscle bilaterally (a total of 30 units divided into 6 sites)   Masseter 10 units x 2   Splenius 5 units 2   Complications: none       RTC for the next Botox injection: 10 weeks

## 2017-03-09 NOTE — TELEPHONE ENCOUNTER
I left a voice mail for patient to return my call. Patient had Afib on her ICD transmission. Patient was symptomatic. Dr. Mejia would like to see her in clinic. I am waiting for her to return my call.

## 2017-03-09 NOTE — MR AVS SNAPSHOT
[facility] - Neurology  [address]  [facility] [address]  Phone: [phone]  Fax: [phone]                  Amna Chawla   3/9/2017 1:00 PM   Procedure visit    Description:  Female : 1965   Provider:  David Cortez MD   Department:  [facility] - Neurology           Reason for Visit     Botulinum Toxin Injection           Diagnoses this Visit        Comments    Chronic migraine without aura, with intractable migraine, so stated, with status migrainosus    -  Primary     Supraorbital neuralgia         Decreased ROM of neck         Vitamin D deficiency         Other vitamin B12 deficiency anemia                To Do List           Future Appointments        Provider Department Dept Phone    3/15/2017 8:00 AM Avelino Mejia MD [facility] Arrhythmia [phone]    3/16/2017 1:40 PM Richmond Toney MD [facility] Cardiology [phone]    3/17/2017 10:15 AM Matt Pugh Jr., MD [facility] Hand [facility] [phone]    3/28/2017 9:40 AM PACEMAKER, ICD [facility] Arrhythmia [phone]    3/29/2017 8:00 AM Lorena Kaufman NP [facility] Sleep [facility] [phone]      Goals (5 Years of Data)     None      Follow-Up and Disposition     Return in about 2 months (around 5/15/2017).       These Medications        Disp Refills Start End    lamotrigine (LAMICTAL) 200 MG tablet 30 tablet 11 3/9/2017 3/9/2018    Take 1 tablet (200 mg total) by mouth once daily. - Oral    Pharmacy: [id number] - [place], [place] - [address] AT [facility] Ph #: [phone]         [facility] On Call     [facility] On Call Nurse Care Line -  Assistance  Registered nurses in the [facility] On Call Center provide clinical advisement, health education, appointment booking, and other advisory services.  Call for this free service at [phone].             Medications           Message regarding Medications     Verify the changes and/or additions to your medication  regime listed below are the same as discussed with your clinician today.  If any of these changes or additions are incorrect, please notify your healthcare provider.        START taking these NEW medications        Refills    lamotrigine (LAMICTAL) 200 MG tablet 11    Sig: Take 1 tablet (200 mg total) by mouth once daily.    Class: Normal    Route: Oral           Verify that the below list of medications is an accurate representation of the medications you are currently taking.  If none reported, the list may be blank. If incorrect, please contact your healthcare provider. Carry this list with you in case of emergency.           Current Medications     aripiprazole (ABILIFY) 5 MG Tab TAKE 1 TABLET(5 MG) BY MOUTH EVERY DAY    atorvastatin (LIPITOR) 40 MG tablet TAKE ONE TABLET BY MOUTH EVERY MORNING    carvedilol (COREG) 25 MG tablet TAKE 1 TABLET(25 MG) BY MOUTH TWICE DAILY WITH MEALS    chlorthalidone (HYGROTEN) 25 MG Tab TAKE 1 TABLET BY MOUTH EVERY DAY    divalproex (DEPAKOTE) 250 MG EC tablet Take 250 mg by mouth once daily.     donepezil (ARICEPT) 10 MG tablet Take 1 tablet (10 mg total) by mouth 2 (two) times daily.    duloxetine (CYMBALTA) 60 MG capsule Take 1 capsule (60 mg total) by mouth 2 (two) times daily.    gabapentin (NEURONTIN) 300 MG capsule Take 3 capsules (900 mg total) by mouth 3 (three) times daily.    lamotrigine (LAMICTAL) 25 MG tablet Take 6 tablets (150 mg total) by mouth once daily.    lorazepam (ATIVAN) 0.5 MG tablet Take 1 tablet (0.5 mg total) by mouth every 6 (six) hours as needed for Anxiety.    magnesium 200 mg Tab Take 200 mg by mouth once daily.    mefenamic acid 250 mg Cap TAKE 2 CAPSULES BY MOUTH TWICE DAILY AS NEEDED.    ondansetron (ZOFRAN-ODT) 4 MG TbDL Take 1 tablet (4 mg total) by mouth every 24 hours as needed (nausea).    pantoprazole (PROTONIX) 40 MG tablet Take 1 tablet (40 mg total) by mouth once daily.    tiagabine (GABITRIL) 4 MG tablet Take 1 tablet (4 mg total) by  "mouth 2 (two) times daily as needed (headache).    tizanidine (ZANAFLEX) 4 MG tablet Take 4 mg by mouth every 6 (six) hours as needed.    trazodone (DESYREL) 100 MG tablet Take 1 tablet (100 mg total) by mouth every evening.    VITAMIN D2 50,000 unit capsule TAKE 1 CAPSULE BY MOUTH EVERY 7 DAYS    zolpidem (AMBIEN) 10 mg Tab Take 1 tablet (10 mg total) by mouth nightly as needed.    desipramine (NORPRAMIN) 25 MG tablet Take 1 tablet (25 mg total) by mouth once daily.    lamotrigine (LAMICTAL) 200 MG tablet Take 1 tablet (200 mg total) by mouth once daily.    topiramate (TOPAMAX) 100 MG tablet Take 1.5 tablets (150 mg total) by mouth 2 (two) times daily.           Clinical Reference Information           Your Vitals Were     BP Pulse Height Weight BMI    143/83 77 5' 4" (1.626 m) 122.1 kg (269 lb 2.9 oz) 46.2 kg/m2      Blood Pressure          Most Recent Value    BP  (!)  143/83      Allergies as of 3/9/2017     Aspirin    Imitrex [Sumatriptan]    Nsaids (Non-steroidal Anti-inflammatory Drug)    Penicillins    Shellfish Containing Products    Percocet [Oxycodone-acetaminophen]    Reglan [Metoclopramide Hcl]      Immunizations Administered on Date of Encounter - 3/9/2017     None      Orders Placed During Today's Visit     Future Labs/Procedures Expected by Expires    Vitamin B12  3/9/2017 5/8/2018    Vitamin B1  3/9/2017 5/8/2018    Vitamin B2  3/9/2017 5/8/2018    Vitamin B6  3/9/2017 5/8/2018    Vitamin D  3/9/2017 5/8/2018      Administrations This Visit     onabotulinumtoxina injection 200 Units     Admin Date Action Dose Route Administered By             03/09/2017 Given 200 Units Intramuscular David Cortez MD                      Language Assistance Services     ATTENTION: Language assistance services are available, free of charge. Please call [phone].      ATENCIÓN: Si habla kymañol, tiene a turcios disposición servicios gratuitos de asistencia lingüística. Llame al [phone].     CHÚ Ý: N?u b?n nói " Ti?ng Vi?t, có các d?ch v? h? tr? ngôn ng? mi?n phí dành cho b?n. G?i s? [phone].         [facility] complies with applicable Federal civil rights laws and does not discriminate on the basis of race, color, national origin, age, disability, or sex.

## 2017-03-10 ENCOUNTER — ANTI-COAG VISIT (OUTPATIENT)
Dept: CARDIOLOGY | Facility: CLINIC | Age: 52
End: 2017-03-10

## 2017-03-10 ENCOUNTER — TELEPHONE (OUTPATIENT)
Dept: ELECTROPHYSIOLOGY | Facility: CLINIC | Age: 52
End: 2017-03-10

## 2017-03-10 DIAGNOSIS — Z79.01 LONG TERM (CURRENT) USE OF ANTICOAGULANTS: ICD-10-CM

## 2017-03-10 DIAGNOSIS — I48.0 PAROXYSMAL ATRIAL FIBRILLATION: ICD-10-CM

## 2017-03-10 NOTE — PROGRESS NOTES
50yo F with PMHx: CVA (2013), Asthma, CAD, depression, HTN, PM, seizures, hx of brain anoxic injury, cardiac arrest (2/2013), pacemaker and chronic migraine headache.  Per telephone note on 3/9: symptomatic atrial fibrillation noted on her ICD recording.  I was able to reach the pt today.  Per the pt and her med card, she was given Warfarin 5mg tablets.  She will start warfarin today, taking her pills in the evenings.  She has our contact info and will come in for an INR and new pt education session on 3/13.

## 2017-03-10 NOTE — TELEPHONE ENCOUNTER
Called patient to discuss her symptomatic atrial fibrillation noted on her ICD recording (had ~ 50minutes).  I had a long discussion with the patient and her sister over the phone about the pathophysiology and risks of atrial fibrillation and its basic pathophysiology, including its health implications and treatment options with the patient. Specifically, I addressed the need for CVA (stroke) prophylaxis with aspirin versus oral anticoagulation (warfarin vs NOACs, discussed bleeding risks, irreversibility of NOACs vs Vitamin K/plasma reversal of warfarin).    The patient opted for coumadin.  Discussed stopping mefenamic acid due to bleeding risk.  Will enroll in coumadin clinic and get blood draw on Monday.  Will also check TSH, CBC at the same time.  She has an appointment with me on Wednesday.    Avelino Mejia MD, PhD  Cardiac Electrophysiology

## 2017-03-10 NOTE — TELEPHONE ENCOUNTER
----- Message from Blanca Wallis sent at 3/9/2017  1:24 PM CST -----   Dr. Mejia came to speak with me this morning to let me know that this is his patient. She was his patient when he was a resident and she was suppose to start seeing him but missed her appointment.   ----- Message -----     From: Ifeanyi Hicks RN     Sent: 3/9/2017  12:16 PM       To: Blanca Wallis    June Y did you book appt with Avelino Mejia on 3/15/17 when this is patient of Humberto Martins?  ----- Message -----     From: Blanca Wallis     Sent: 3/8/2017   1:36 PM       To: Ifeanyi Hicks RN, Humberto Martins MD    Patient had and AF episode on 03/07/2017, patient send a manual transmission as she was symptomatic. Patient stated she was having palpitations. Afib episode lasted 51 minutes and 21 seconds. Patient is not currently on any OAC's.

## 2017-03-11 DIAGNOSIS — S03.00XA TMJ (DISLOCATION OF TEMPOROMANDIBULAR JOINT), INITIAL ENCOUNTER: ICD-10-CM

## 2017-03-11 RX ORDER — MEFENAMIC ACID 250 MG/1
CAPSULE ORAL
Refills: 0 | OUTPATIENT
Start: 2017-03-11

## 2017-03-13 ENCOUNTER — TELEPHONE (OUTPATIENT)
Dept: NEUROLOGY | Facility: CLINIC | Age: 52
End: 2017-03-13

## 2017-03-13 ENCOUNTER — ANTI-COAG VISIT (OUTPATIENT)
Dept: CARDIOLOGY | Facility: CLINIC | Age: 52
End: 2017-03-13
Payer: MEDICARE

## 2017-03-13 ENCOUNTER — LAB VISIT (OUTPATIENT)
Dept: LAB | Facility: HOSPITAL | Age: 52
End: 2017-03-13
Attending: INTERNAL MEDICINE
Payer: MEDICARE

## 2017-03-13 ENCOUNTER — TELEPHONE (OUTPATIENT)
Dept: ELECTROPHYSIOLOGY | Facility: CLINIC | Age: 52
End: 2017-03-13

## 2017-03-13 DIAGNOSIS — Z79.01 LONG TERM (CURRENT) USE OF ANTICOAGULANTS: Primary | ICD-10-CM

## 2017-03-13 DIAGNOSIS — I48.0 PAROXYSMAL ATRIAL FIBRILLATION: ICD-10-CM

## 2017-03-13 LAB
BASOPHILS # BLD AUTO: 0.01 K/UL
BASOPHILS NFR BLD: 0.2 %
DIFFERENTIAL METHOD: ABNORMAL
EOSINOPHIL # BLD AUTO: 0 K/UL
EOSINOPHIL NFR BLD: 0.7 %
ERYTHROCYTE [DISTWIDTH] IN BLOOD BY AUTOMATED COUNT: 15.1 %
HCT VFR BLD AUTO: 37.4 %
HGB BLD-MCNC: 12.7 G/DL
INR PPP: 1.1
INR PPP: 1.1 (ref 2–3)
LYMPHOCYTES # BLD AUTO: 1.6 K/UL
LYMPHOCYTES NFR BLD: 37.5 %
MCH RBC QN AUTO: 29.7 PG
MCHC RBC AUTO-ENTMCNC: 34 %
MCV RBC AUTO: 88 FL
MONOCYTES # BLD AUTO: 0.4 K/UL
MONOCYTES NFR BLD: 10.2 %
NEUTROPHILS # BLD AUTO: 2.2 K/UL
NEUTROPHILS NFR BLD: 51.2 %
PLATELET # BLD AUTO: 204 K/UL
PMV BLD AUTO: 12.5 FL
PROTHROMBIN TIME: 11.5 SEC
RBC # BLD AUTO: 4.27 M/UL
TSH SERPL DL<=0.005 MIU/L-ACNC: 1.41 UIU/ML
WBC # BLD AUTO: 4.32 K/UL

## 2017-03-13 PROCEDURE — 99211 OFF/OP EST MAY X REQ PHY/QHP: CPT | Mod: PBBFAC

## 2017-03-13 PROCEDURE — 85610 PROTHROMBIN TIME: CPT | Mod: PBBFAC

## 2017-03-13 PROCEDURE — 99999 PR PBB SHADOW E&M-EST. PATIENT-LVL I: CPT | Mod: PBBFAC,,,

## 2017-03-13 NOTE — TELEPHONE ENCOUNTER
NEUROPSYCHOLOGICAL EVALUATION - CONFIDENTIAL  FEEDBACK NOTE    On 3/13/17, I provided Ms. Amna Chawla and her sister the results of her neuropsychological evaluation. Please see her full report for a comprehensive overview of the findings. She was provided a copy of the report and invited to call with additional questions.      Gigi Rivas Psy.D., YOVANAP  Board Certified in Clinical Neuropsychology  [facility] - Department of Neurology

## 2017-03-13 NOTE — PROGRESS NOTES
Patient presents for new patient education.  Written and verbal instructions given to the patient on the importance of follow-up monitoring, compliance issues, dietary restrictions, and potential for adverse drug reactions and interactions. All questions were answered to her satisfaction and understanding.  She reports eating asparagus/brussels sprouts 3-4 times this past week.  She intends to proceed with 2 cups of greens/week.  She does not drink EtOH, cranberry, or grapefruit.  INR not responding to warfarin 5mg dose.  Begin to increase dose and repeat INR 3/16.  Patient advised to contact clinic with any changes, questions, or concerns.  I have reviewed the student's initial findings and agree with their assessment.  I have personally spoken with and assessed the patient in clinic to devise care plan.

## 2017-03-14 ENCOUNTER — TELEPHONE (OUTPATIENT)
Dept: NEUROLOGY | Facility: CLINIC | Age: 52
End: 2017-03-14

## 2017-03-14 DIAGNOSIS — I48.91 ATRIAL FIBRILLATION, UNSPECIFIED TYPE: Primary | ICD-10-CM

## 2017-03-14 NOTE — TELEPHONE ENCOUNTER
Returned Patient's call.She has experienced a headache since yesterday.Pain is rated at a 7.She can't take Mefenamic acid due to the fact she is now on Coumadin-she also can't also take her muscle relaxer due to this.Will inform Dr Cortez.

## 2017-03-14 NOTE — TELEPHONE ENCOUNTER
----- Message from Maria Alejandra Flaherty sent at 3/14/2017  8:26 AM CDT -----  Contact: Patient [phone]  Patient is calling to speak with nurse, regarding her headache medication. Please call

## 2017-03-15 ENCOUNTER — OFFICE VISIT (OUTPATIENT)
Dept: ELECTROPHYSIOLOGY | Facility: CLINIC | Age: 52
End: 2017-03-15
Payer: MEDICARE

## 2017-03-15 ENCOUNTER — TELEPHONE (OUTPATIENT)
Dept: ORTHOPEDICS | Facility: CLINIC | Age: 52
End: 2017-03-15

## 2017-03-15 VITALS
SYSTOLIC BLOOD PRESSURE: 124 MMHG | HEART RATE: 60 BPM | DIASTOLIC BLOOD PRESSURE: 90 MMHG | HEIGHT: 64 IN | BODY MASS INDEX: 45.66 KG/M2 | WEIGHT: 267.44 LBS

## 2017-03-15 DIAGNOSIS — I10 ESSENTIAL HYPERTENSION: ICD-10-CM

## 2017-03-15 DIAGNOSIS — I44.30 HEART BLOCK, AV: ICD-10-CM

## 2017-03-15 DIAGNOSIS — I48.91 ATRIAL FIBRILLATION, UNSPECIFIED TYPE: ICD-10-CM

## 2017-03-15 DIAGNOSIS — I48.0 PAROXYSMAL ATRIAL FIBRILLATION: ICD-10-CM

## 2017-03-15 DIAGNOSIS — E66.01 OBESITY, CLASS III, BMI 40-49.9 (MORBID OBESITY): ICD-10-CM

## 2017-03-15 DIAGNOSIS — Z86.74 HISTORY OF SUDDEN CARDIAC ARREST: ICD-10-CM

## 2017-03-15 DIAGNOSIS — Z95.810 AUTOMATIC IMPLANTABLE CARDIOVERTER-DEFIBRILLATOR IN SITU: Primary | ICD-10-CM

## 2017-03-15 PROCEDURE — 93005 ELECTROCARDIOGRAM TRACING: CPT | Mod: PBBFAC | Performed by: INTERNAL MEDICINE

## 2017-03-15 PROCEDURE — 93010 ELECTROCARDIOGRAM REPORT: CPT | Mod: ,,, | Performed by: INTERNAL MEDICINE

## 2017-03-15 PROCEDURE — 99213 OFFICE O/P EST LOW 20 MIN: CPT | Mod: S$PBB,,, | Performed by: INTERNAL MEDICINE

## 2017-03-15 PROCEDURE — 99213 OFFICE O/P EST LOW 20 MIN: CPT | Mod: PBBFAC | Performed by: INTERNAL MEDICINE

## 2017-03-15 PROCEDURE — 99999 PR PBB SHADOW E&M-EST. PATIENT-LVL III: CPT | Mod: PBBFAC,,, | Performed by: INTERNAL MEDICINE

## 2017-03-15 NOTE — MR AVS SNAPSHOT
[facility]  [address]  [facility] [address]  Phone: [phone]  Fax: [phone]                  Amna Chawla   3/15/2017 8:00 AM   Office Visit    Description:  Female : 1965   Provider:  Avelino Mejia MD   Department:  [facility]           Reason for Visit     Pacemaker Check     Atrial Fibrillation           Diagnoses this Visit        Comments    Automatic implantable cardioverter-defibrillator in situ    -  Primary     Atrial fibrillation, unspecified type         Obesity, Class III, BMI 40-49.9 (morbid obesity)         Paroxysmal atrial fibrillation         History of sudden cardiac arrest         Heart block, AV         Essential hypertension                To Do List           Future Appointments        Provider Department Dept Phone    3/16/2017 1:15 PM Leeanne Aden PharmD [facility] Coumadin [phone]    3/16/2017 1:40 PM Richmond Toney MD [facility] Cardiology [phone]    3/17/2017 10:15 AM Matt Pugh Jr., MD [facility] [phone]    3/29/2017 8:00 AM Lorena Kaufman NP [facility] Sleep [facility] [phone]    2017 1:00 PM David Cortez MD [facility] Neurology [phone]      Goals (5 Years of Data)     None      Follow-Up and Disposition     Return in about 6 months (around 9/15/2017).      [facility] On [facility]     [facility] On Call Nurse Care Line - 24/7 Assistance  Registered nurses in the [facility] On Call Center provide clinical advisement, health education, appointment booking, and other advisory services.  Call for this free service at [phone].             Medications           Message regarding Medications     Verify the changes and/or additions to your medication regime listed below are the same as discussed with your clinician today.  If any of these changes or additions are incorrect, please notify your healthcare provider.        STOP taking these medications     desipramine (NORPRAMIN)  25 MG tablet Take 1 tablet (25 mg total) by mouth once daily.    duloxetine (CYMBALTA) 60 MG capsule Take 1 capsule (60 mg total) by mouth 2 (two) times daily.    mefenamic acid 250 mg Cap TAKE 2 CAPSULES BY MOUTH TWICE DAILY AS NEEDED.           Verify that the below list of medications is an accurate representation of the medications you are currently taking.  If none reported, the list may be blank. If incorrect, please contact your healthcare provider. Carry this list with you in case of emergency.           Current Medications     aripiprazole (ABILIFY) 5 MG Tab TAKE 1 TABLET(5 MG) BY MOUTH EVERY DAY    atorvastatin (LIPITOR) 40 MG tablet TAKE ONE TABLET BY MOUTH EVERY MORNING    carvedilol (COREG) 25 MG tablet TAKE 1 TABLET(25 MG) BY MOUTH TWICE DAILY WITH MEALS    chlorthalidone (HYGROTEN) 25 MG Tab TAKE 1 TABLET BY MOUTH EVERY DAY    divalproex (DEPAKOTE) 250 MG EC tablet Take 250 mg by mouth once daily.     donepezil (ARICEPT) 10 MG tablet Take 1 tablet (10 mg total) by mouth 2 (two) times daily.    gabapentin (NEURONTIN) 300 MG capsule Take 3 capsules (900 mg total) by mouth 3 (three) times daily.    lamotrigine (LAMICTAL) 200 MG tablet Take 1 tablet (200 mg total) by mouth once daily.    lorazepam (ATIVAN) 0.5 MG tablet Take 1 tablet (0.5 mg total) by mouth every 6 (six) hours as needed for Anxiety.    magnesium 200 mg Tab Take 200 mg by mouth once daily.    ondansetron (ZOFRAN-ODT) 4 MG TbDL Take 1 tablet (4 mg total) by mouth every 24 hours as needed (nausea).    pantoprazole (PROTONIX) 40 MG tablet Take 1 tablet (40 mg total) by mouth once daily.    tiagabine (GABITRIL) 4 MG tablet Take 1 tablet (4 mg total) by mouth 2 (two) times daily as needed (headache).    tizanidine (ZANAFLEX) 4 MG tablet Take 4 mg by mouth every 6 (six) hours as needed.    topiramate (TOPAMAX) 100 MG tablet Take 1.5 tablets (150 mg total) by mouth 2 (two) times daily.    trazodone (DESYREL) 100 MG tablet Take 1 tablet (100 mg  "total) by mouth every evening.    VITAMIN D2 50,000 unit capsule TAKE 1 CAPSULE BY MOUTH EVERY 7 DAYS    warfarin (COUMADIN) 5 MG tablet Take 1 tablet (5 mg total) by mouth Daily.    zolpidem (AMBIEN) 10 mg Tab Take 1 tablet (10 mg total) by mouth nightly as needed.           Clinical Reference Information           Your Vitals Were     BP Pulse Height Weight BMI    124/90 60 5' 4" (1.626 m) 121.3 kg (267 lb 6.7 oz) 45.9 kg/m2      Blood Pressure          Most Recent Value    BP  (!)  124/90      Allergies as of 3/15/2017     Aspirin    Imitrex [Sumatriptan]    Nsaids (Non-steroidal Anti-inflammatory Drug)    Penicillins    Shellfish Containing Products    Percocet [Oxycodone-acetaminophen]    Reglan [Metoclopramide Hcl]      Immunizations Administered on Date of Encounter - 3/15/2017     None      Orders Placed During Today's Visit      Normal Orders This Visit    Rhythm strip       Language Assistance Services     ATTENTION: Language assistance services are available, free of charge. Please call [phone].      ATENCIÓN: Si kailashla adebayo, tiene a turcios disposición servicios gratuitos de asistencia lingüística. Llame al [phone].     REUBEN Ý: N?u b?n nói Ti?ng Vi?t, có các d?ch v? h? tr? ngôn ng? mi?n phí dành cho b?n. G?i s? [phone].         [facility] complies with applicable Federal civil rights laws and does not discriminate on the basis of race, color, national origin, age, disability, or sex.        "

## 2017-03-15 NOTE — PROGRESS NOTES
Subjective:    Patient ID:  Amna Chawla is a 51 y.o. female who presents for follow-up of Pacemaker Check and Atrial Fibrillation      HPI   Prior Hx:  I had the pleasure of seeing Amna Chawla in follow-up today for her history of cardiac arrest, heart block, and ICD implantation. As you are aware, she is a 51-year old female, former patient of Dr. Humberto Martins and patient of mine I cared for when she initially presented in cardiac arrest as well as followed in my general cardiology fellow clinic, who was admitted to [facility] in 2/2013 after having a cardiac arrest.  She was working as a school  and collapsed at work.  On EMS arrival it was described that she was pulseless and given epinephrine (? Initially PEA) however after epinephrine noted to be in VF and was resuscitated with defibrillation/ACLS.  She underwent hypothermia protocol. Her post-arrest course was notable or intermittent 3rd-degree AV block. On 2/15/2013, a dual chamber ICD was implanted. Her EF prior to discharge was 60%. She had a negative coronary CTA during that admission.  In subsequent follow-up she underwent a pharmacologic stress ECHO in 2014 which showed a preserved LVEF and no ischemia.     Interim Hx:  Subsequent ICD interrogations show no VT, 100% RV pacing.  She recently noted an episode of palpitations.  She called our device clinic.  TTM disclosed a 50 minute episode of paroxysmal afib/flutter with RVR.  Review of egms shows it initiates with likely a short burst of afib and then turns into a regular rhythm with TCL ~210msec (at least in the right atrium as sensed by RA lead).  We discussed this over the phone including need for anticoagulation.  After given options she elected for coumadin.  Her main issue is with refractory headaches.  TSH was checked and is normal.     I reviewed today's ECG tracing, which shows AV paced rhythm at 60 bpm and a QRS duration of 170 ms.    Review of Systems   Constitution: Negative.  Negative for weakness and malaise/fatigue.   HENT: Positive for headaches. Negative for congestion.    Eyes: Negative.    Cardiovascular: Positive for palpitations (per HPI). Negative for chest pain and dyspnea on exertion.   Respiratory: Negative.  Negative for cough.    Endocrine: Negative.    Hematologic/Lymphatic: Negative for bleeding problem. Does not bruise/bleed easily.   Skin: Negative.    Musculoskeletal: Negative.    Gastrointestinal: Negative.  Negative for abdominal pain, hematochezia and melena.   Genitourinary: Negative.    Psychiatric/Behavioral: Negative.         Objective:    Physical Exam   Constitutional: She is oriented to person, place, and time. She appears well-developed and well-nourished. No distress.   HENT:   Head: Normocephalic and atraumatic.   Eyes: Conjunctivae are normal. Right eye exhibits no discharge. Left eye exhibits no discharge.   Neck: Neck supple. No JVD present.   Cardiovascular: Normal rate, regular rhythm and normal heart sounds.  Exam reveals no gallop and no friction rub.    No murmur heard.  Pulmonary/Chest: Effort normal and breath sounds normal. No respiratory distress. She has no wheezes. She has no rales.   Abdominal: Soft. Bowel sounds are normal. She exhibits no distension. There is no tenderness.   Musculoskeletal: She exhibits no edema or tenderness.   Neurological: She is alert and oriented to person, place, and time.   Skin: Skin is warm and dry. She is not diaphoretic.   Psychiatric: She has a normal mood and affect. Her behavior is normal. Thought content normal.   Vitals reviewed.        Assessment:       1. Automatic implantable cardioverter-defibrillator in situ    2. Atrial fibrillation, unspecified type    3. Obesity, Class III, BMI 40-49.9 (morbid obesity)    4. Paroxysmal atrial fibrillation    5. History of sudden cardiac arrest    6. Heart block, AV    7. Essential hypertension         Plan:       In summary, Mrs. Chawla is a pleasant 50 yo  cardiac arrest survivor with VF noted during arrest, no ischemic heart disease with preserved LVEF and now 3rd degree AV block who presents for ICD follow-up as well as having an episode of symptomatic sustained atrial fibrillation/atrial flutter (initiated irregularly then settled into a regular atrial tachycardia as seen on the RA lead with TCL ~210ms).  I had a long discussion with the patient about the pathophysiology and risks of atrial fibrillation and flutter and its basic pathophysiology, including its health implications and treatment options with the patient. Specifically, I addressed the need for CVA (stroke) prophylaxis with aspirin versus oral anticoagulation (warfarin vs NOACs, discussed bleeding risks, irreversibility of NOACs vs Vitamin K/plasma reversal of warfarin, and need to come to the ER for any head trauma for CT scanning even if asymptomatic).  Her [id number] score is 2 and she decided on warfarin for cardioembolic stroke prophylaxis.  For a single event of atrial fibrillation that reverted spontaneously would not start an anti-arrhythmic.  She will notify us if she has any further symptomatic episodes.    RTC in 6 months.    Thank you for allowing me to participate in the care of this patient. Please do not hesitate to call me with any questions or concerns.    Avelino Mejia MD, PhD  Cardiac Electrophysiology

## 2017-03-16 ENCOUNTER — OFFICE VISIT (OUTPATIENT)
Dept: CARDIOLOGY | Facility: CLINIC | Age: 52
End: 2017-03-16
Payer: MEDICARE

## 2017-03-16 ENCOUNTER — ANTI-COAG VISIT (OUTPATIENT)
Dept: CARDIOLOGY | Facility: CLINIC | Age: 52
End: 2017-03-16
Payer: MEDICARE

## 2017-03-16 VITALS
HEIGHT: 64 IN | SYSTOLIC BLOOD PRESSURE: 138 MMHG | HEART RATE: 66 BPM | BODY MASS INDEX: 46.03 KG/M2 | WEIGHT: 269.63 LBS | DIASTOLIC BLOOD PRESSURE: 76 MMHG

## 2017-03-16 DIAGNOSIS — I48.0 PAROXYSMAL ATRIAL FIBRILLATION: ICD-10-CM

## 2017-03-16 DIAGNOSIS — R07.9 CHEST PAIN, UNSPECIFIED TYPE: ICD-10-CM

## 2017-03-16 DIAGNOSIS — Z79.01 LONG TERM (CURRENT) USE OF ANTICOAGULANTS: Primary | ICD-10-CM

## 2017-03-16 DIAGNOSIS — I10 ESSENTIAL HYPERTENSION: ICD-10-CM

## 2017-03-16 DIAGNOSIS — E78.5 HYPERLIPIDEMIA, UNSPECIFIED HYPERLIPIDEMIA TYPE: Primary | ICD-10-CM

## 2017-03-16 LAB — INR PPP: 1.5 (ref 2–3)

## 2017-03-16 PROCEDURE — 99214 OFFICE O/P EST MOD 30 MIN: CPT | Mod: PBBFAC,27 | Performed by: INTERNAL MEDICINE

## 2017-03-16 PROCEDURE — 99213 OFFICE O/P EST LOW 20 MIN: CPT | Mod: S$PBB,GC,, | Performed by: INTERNAL MEDICINE

## 2017-03-16 PROCEDURE — 99999 PR PBB SHADOW E&M-EST. PATIENT-LVL I: CPT | Mod: PBBFAC,,, | Performed by: PHARMACIST

## 2017-03-16 PROCEDURE — 99999 PR PBB SHADOW E&M-EST. PATIENT-LVL IV: CPT | Mod: PBBFAC,GC,, | Performed by: INTERNAL MEDICINE

## 2017-03-16 RX ORDER — CHLORTHALIDONE 25 MG/1
25 TABLET ORAL DAILY
Qty: 90 TABLET | Refills: 11 | Status: SHIPPED | OUTPATIENT
Start: 2017-03-16 | End: 2017-11-14 | Stop reason: SDUPTHER

## 2017-03-16 RX ORDER — CARVEDILOL 25 MG/1
TABLET ORAL
Qty: 180 TABLET | Refills: 3 | Status: SHIPPED | OUTPATIENT
Start: 2017-03-16 | End: 2018-04-02 | Stop reason: SDUPTHER

## 2017-03-16 RX ORDER — ATORVASTATIN CALCIUM 40 MG/1
40 TABLET, FILM COATED ORAL EVERY MORNING
Qty: 90 TABLET | Refills: 3 | Status: SHIPPED | OUTPATIENT
Start: 2017-03-16 | End: 2017-11-14 | Stop reason: SDUPTHER

## 2017-03-16 NOTE — MR AVS SNAPSHOT
[facility]  [address]  [facility] [address]  Phone: [phone]                  Amna Chawla   3/16/2017 1:40 PM   Office Visit    Description:  Female : 1965   Provider:  Richmond Toney MD   Department:  [facility]           Reason for Visit     Palpitations           Diagnoses this Visit        Comments    Hyperlipidemia, unspecified hyperlipidemia type    -  Primary     Chest pain, unspecified type         Chest pain, unspecified type                To Do List           Future Appointments        Provider Department Dept Phone    3/17/2017 10:15 AM Matt Pugh Jr., MD [facility] [phone]    3/20/2017 10:45 AM Yvonne CanD [facility] Coumadin [phone]    3/29/2017 8:00 AM Lorena Kaufman NP [facility] Sleep [facility] [phone]    2017 1:00 PM David Cortez MD [facility] Neurology [phone]    2017 2:30 PM Missy Rock MD [facility] Neurology [phone]      Goals (5 Years of Data)     None       These Medications        Disp Refills Start End    atorvastatin (LIPITOR) 40 MG tablet 90 tablet 3 3/16/2017     Take 1 tablet (40 mg total) by mouth every morning. - Oral    Pharmacy: [id number] - [place], [place] - [address] Ph #: [phone]       carvedilol (COREG) 25 MG tablet 180 tablet 3 3/16/2017     TAKE 1 TABLET(25 MG) BY MOUTH TWICE DAILY WITH MEALS    Pharmacy: [id number] - [place], [place] - [address] Ph #: [phone]       Notes to Pharmacy: **Patient requests 90 days supply**    chlorthalidone (HYGROTEN) 25 MG Tab 90 tablet 11 3/16/2017     Take 1 tablet (25 mg total) by mouth once daily. - Oral    Pharmacy: [id number] - [place], [place] - [address] Ph #: [phone]       Notes to Pharmacy:  **Patient requests 90 days supply**      [facility] On Call     [facility] On Call Nurse Care Line - 24/7 Assistance  Registered nurses in the [facility] On Call Center provide clinical advisement, health education, appointment booking, and other advisory services.  Call for this free service at [phone].             Medications           Message regarding Medications     Verify the changes and/or additions to your medication regime listed below are the same as discussed with your clinician today.  If any of these changes or additions are incorrect, please notify your healthcare provider.        CHANGE how you are taking these medications     Start Taking Instead of    atorvastatin (LIPITOR) 40 MG tablet atorvastatin (LIPITOR) 40 MG tablet    Dosage:  Take 1 tablet (40 mg total) by mouth every morning. Dosage:  TAKE ONE TABLET BY MOUTH EVERY MORNING    Reason for Change:  Reorder     chlorthalidone (HYGROTEN) 25 MG Tab chlorthalidone (HYGROTEN) 25 MG Tab    Dosage:  Take 1 tablet (25 mg total) by mouth once daily. Dosage:  TAKE 1 TABLET BY MOUTH EVERY DAY    Reason for Change:  Reorder       STOP taking these medications     tiagabine (GABITRIL) 4 MG tablet Take 1 tablet (4 mg total) by mouth 2 (two) times daily as needed (headache).           Verify that the below list of medications is an accurate representation of the medications you are currently taking.  If none reported, the list may be blank. If incorrect, please contact your healthcare provider. Carry this list with you in case of emergency.           Current Medications     aripiprazole (ABILIFY) 5 MG Tab TAKE 1 TABLET(5 MG) BY MOUTH EVERY DAY    atorvastatin (LIPITOR) 40 MG tablet Take 1 tablet (40 mg total) by mouth every morning.    carvedilol (COREG) 25 MG tablet TAKE 1 TABLET(25 MG) BY MOUTH TWICE DAILY WITH MEALS    chlorthalidone (HYGROTEN) 25 MG Tab Take 1 tablet (25 mg total) by mouth once daily.    divalproex (DEPAKOTE) 250 MG EC tablet Take 250 mg  "by mouth once daily.     donepezil (ARICEPT) 10 MG tablet Take 1 tablet (10 mg total) by mouth 2 (two) times daily.    gabapentin (NEURONTIN) 300 MG capsule Take 3 capsules (900 mg total) by mouth 3 (three) times daily.    lamotrigine (LAMICTAL) 200 MG tablet Take 1 tablet (200 mg total) by mouth once daily.    lorazepam (ATIVAN) 0.5 MG tablet Take 1 tablet (0.5 mg total) by mouth every 6 (six) hours as needed for Anxiety.    magnesium 200 mg Tab Take 200 mg by mouth once daily.    ondansetron (ZOFRAN-ODT) 4 MG TbDL Take 1 tablet (4 mg total) by mouth every 24 hours as needed (nausea).    pantoprazole (PROTONIX) 40 MG tablet Take 1 tablet (40 mg total) by mouth once daily.    tizanidine (ZANAFLEX) 4 MG tablet Take 4 mg by mouth every 6 (six) hours as needed.    topiramate (TOPAMAX) 100 MG tablet Take 1.5 tablets (150 mg total) by mouth 2 (two) times daily.    trazodone (DESYREL) 100 MG tablet Take 1 tablet (100 mg total) by mouth every evening.    VITAMIN D2 50,000 unit capsule TAKE 1 CAPSULE BY MOUTH EVERY 7 DAYS    warfarin (COUMADIN) 5 MG tablet Take 1 tablet (5 mg total) by mouth Daily.    zolpidem (AMBIEN) 10 mg Tab Take 1 tablet (10 mg total) by mouth nightly as needed.           Clinical Reference Information           Your Vitals Were     BP Pulse Height Weight BMI    138/76 (BP Location: Left arm, Patient Position: Sitting, BP Method: Automatic) 66 5' 4" (1.626 m) 122.3 kg (269 lb 10 oz) 46.28 kg/m2      Blood Pressure          Most Recent Value    Right Arm BP - Sitting  141/77    Left Arm BP - Sitting  138/76    BP  138/76      Allergies as of 3/16/2017     Aspirin    Imitrex [Sumatriptan]    Nsaids (Non-steroidal Anti-inflammatory Drug)    Penicillins    Shellfish Containing Products    Percocet [Oxycodone-acetaminophen]    Reglan [Metoclopramide Hcl]      Immunizations Administered on Date of Encounter - 3/16/2017     None      Orders Placed During Today's Visit     Future Labs/Procedures Expected by " Expires    Lipid panel  3/16/2017 5/15/2018    Magnesium  3/16/2017 5/15/2018    Cardiac PET Scan Stress  As directed 3/16/2018      Language Assistance Services     ATTENTION: Language assistance services are available, free of charge. Please call [phone].      ATENCIÓN: Si habla español, tiene a turcios disposición servicios gratuitos de asistencia lingüística. Llame al [phone].     CHÚ Ý: N?u b?n nói Ti?ng Vi?t, có các d?ch v? h? tr? ngôn ng? mi?n phí dành cho b?n. G?i s? [phone].         [facility] complies with applicable Federal civil rights laws and does not discriminate on the basis of race, color, national origin, age, disability, or sex.

## 2017-03-16 NOTE — PROGRESS NOTES
"     Cardiology Clinic Note  Reason for Visit: follow up    HPI:     Ms Chawla is a 52yo F with cardiac arrest 2/2013 due to electrolyte derangement s/p [employer] dcICD for prevention of sudden cardiac death, hypertension, hyperlipidemia, anoxic brain injury, and newly diagnosed atrial fibrillation, who presents for follow up.    She has several complaints today. She reports frequent hot flashes, which occur mostly at night. She experiences profuse sweating with no other associated symptoms. She has been evaluated by her OB/GYN and PCP, who she reports recommended seeing her cardiologist for management of hot flashes.    She also reports having a "splinter" sensation in the center of her chest that only occurs with exertion and is relieved with rest. There are no associated symptoms or radiation of discomfort. The discomfort started in late February 2017 (around Mardi [place]) and is distinctly different from atypical chest pain that prompted prior stress test. She reports that she discontinue her atorvastatin at the instruction of a provider. She wishes to have lipids checked. She is also interested in participating in cardiac rehab.    Labs 3/13/17 with INR 1.1. She started on warfarin therapy on 3/10/17 for anticoagulation in the setting of atrial fibrillation. She denies blood in her stool or urine.     Prior issues:  Clinic 3/3/16 - BP elevated; metoprolol changed to coreg; chlorthalidone added  Clinic 9/15/16 - atypical chest pain in to appointment, had prior negative DSE for the same in 2014; reported palpitations, no arrhythmia but PMT x5 noted on interrogation; PVARP increased from 280 to 300ms  Device interrogation 3/7/17 - symptomatic with palpitations; found atrial fibrillation that lasted 51 minutes and 21 seconds; started on warfarin    ROS:    Constitution: Negative for fever or chills. Negative for weight loss or gain.   HENT: Negative for sore throat or headaches. Negative for " rhinorrhea.  Eyes: Negative for blurred or double vision.   Cardiovascular: See above  Pulmonary: Negative for SOB. Negative for cough.   Gastrointestinal: Negative for abdominal pain. Negative for nausea/vomiting. Negative for diarrhea.   : Negative for dysuria.    Skin: Negative for rashes.  Neurological: Negative for focal weakness or sensory changes.  Psychological: Negative for depression or anxiety.  PMH:     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    PEA arrest with subsequent long-QT    Hypertension     Pacemaker 2013    Seizures     Stroke     weakness lt side     Past Surgical History:   Procedure Laterality Date    breast reduction      BREAST SURGERY      CARDIAC DEFIBRILLATOR PLACEMENT      CARDIAC DEFIBRILLATOR PLACEMENT      CARPAL TUNNEL RELEASE Right     COSMETIC SURGERY      HERNIA REPAIR      HIATAL HERNIA REPAIR      INSERT / REPLACE / REMOVE PACEMAKER      TUBAL LIGATION       Allergies:     Review of patient's allergies indicates:   Allergen Reactions    Aspirin Hives    Imitrex [sumatriptan] Palpitations    Nsaids (non-steroidal anti-inflammatory drug) Shortness Of Breath    Penicillins Hives and Swelling    Shellfish containing products Anaphylaxis     seafood    Percocet [oxycodone-acetaminophen] Itching     States can take    Reglan [metoclopramide hcl] Other (See Comments)     Parkinsonism      Medications:     Current Outpatient Prescriptions on File Prior to Visit   Medication Sig Dispense Refill    aripiprazole (ABILIFY) 5 MG Tab TAKE 1 TABLET(5 MG) BY MOUTH EVERY DAY 90 tablet 5    atorvastatin (LIPITOR) 40 MG tablet TAKE ONE TABLET BY MOUTH EVERY MORNING 30 tablet 0    carvedilol (COREG) 25 MG tablet TAKE 1 TABLET(25 MG) BY MOUTH TWICE DAILY WITH MEALS 180 tablet 11    chlorthalidone (HYGROTEN) 25 MG  Tab TAKE 1 TABLET BY MOUTH EVERY DAY 90 tablet 11    divalproex (DEPAKOTE) 250 MG EC tablet Take 250 mg by mouth once daily.   11    donepezil (ARICEPT) 10 MG tablet Take 1 tablet (10 mg total) by mouth 2 (two) times daily. 180 tablet 3    gabapentin (NEURONTIN) 300 MG capsule Take 3 capsules (900 mg total) by mouth 3 (three) times daily. 810 capsule 12    lamotrigine (LAMICTAL) 200 MG tablet Take 1 tablet (200 mg total) by mouth once daily. 30 tablet 11    lorazepam (ATIVAN) 0.5 MG tablet Take 1 tablet (0.5 mg total) by mouth every 6 (six) hours as needed for Anxiety. 30 tablet 1    magnesium 200 mg Tab Take 200 mg by mouth once daily.      ondansetron (ZOFRAN-ODT) 4 MG TbDL Take 1 tablet (4 mg total) by mouth every 24 hours as needed (nausea). 8 tablet 3    pantoprazole (PROTONIX) 40 MG tablet Take 1 tablet (40 mg total) by mouth once daily. 30 tablet 11    tizanidine (ZANAFLEX) 4 MG tablet Take 4 mg by mouth every 6 (six) hours as needed.      topiramate (TOPAMAX) 100 MG tablet Take 1.5 tablets (150 mg total) by mouth 2 (two) times daily. 90 tablet 12    trazodone (DESYREL) 100 MG tablet Take 1 tablet (100 mg total) by mouth every evening. 30 tablet 11    VITAMIN D2 50,000 unit capsule TAKE 1 CAPSULE BY MOUTH EVERY 7 DAYS 6 capsule 0    warfarin (COUMADIN) 5 MG tablet Take 1 tablet (5 mg total) by mouth Daily. 30 tablet 11    zolpidem (AMBIEN) 10 mg Tab Take 1 tablet (10 mg total) by mouth nightly as needed. 30 tablet 5    [DISCONTINUED] tiagabine (GABITRIL) 4 MG tablet Take 1 tablet (4 mg total) by mouth 2 (two) times daily as needed (headache). 40 tablet 11     Current Facility-Administered Medications on File Prior to Visit   Medication Dose Route Frequency Provider Last Rate Last Dose    onabotulinumtoxina injection 200 Units  2 vial Intramuscular Q90 Days David Cortez MD   200 Units at 09/15/16 1225    onabotulinumtoxina injection 200 Units  2 vial Intramuscular Q90 Days David Cortez MD   "      onabotulinumtoxina injection 200 Units  2 vial Intramuscular Q90 Days David Cortez MD   200 Units at 11/30/16 1136    onabotulinumtoxina injection 200 Units  200 Units Intramuscular Q10 weeks David Cortez MD   200 Units at 03/09/17 1343    onabotulinumtoxina injection 200 Units  200 Units Intramuscular Q90 Days David Cortez MD         Social History:     Social History   Substance Use Topics    Smoking status: Never Smoker    Smokeless tobacco: Never Used    Alcohol use No     Family History:     Family History   Problem Relation Age of Onset    Hypertension Mother     COPD      Heart failure       Physical Exam:   /76 (BP Location: Left arm, Patient Position: Sitting, BP Method: Automatic)  Pulse 66  Ht 5' 4" (1.626 m)  Wt 122.3 kg (269 lb 10 oz)  BMI 46.28 kg/m2     Constitutional: No apparent distress, conversant  HEENT: Sclera anicteric, extraocular movements intact  Neck: No jugular venous distension, no carotid bruits  CV: Regular rate and rhythm, no murmurs rubs or gallops, normal S1/S2  Pulm: Clear to auscultation bilaterally, no wheezes, rales, or ronchi  GI: Abdomen soft, obese, nontender, nondistended, normoactive bowel sounds  Extremities: No lower extremity edema, warm with palpable pulses  Skin: No ecchymosis, erythema, or ulcers  Psych: Alert and oriented to person place location, appropriate affect  Neuro: No focal deficits    Labs:     Lab Results   Component Value Date     04/14/2016    K 4.0 04/14/2016     04/14/2016    CO2 23 04/14/2016    BUN 12 04/14/2016    CREATININE 0.8 04/14/2016    ANIONGAP 6 (L) 04/14/2016     Lab Results   Component Value Date    AST 14 03/03/2016    ALT 19 03/03/2016    ALKPHOS 55 03/03/2016    BILITOT 0.2 03/03/2016    ALBUMIN 3.3 (L) 03/03/2016     Lab Results   Component Value Date    CALCIUM 8.7 04/14/2016    MG 2.2 05/30/2015    PHOS 4.3 05/30/2015     Lab Results   Component Value Date    BNP 11 02/02/2014    BNP 90 " 02/11/2013     (H) 02/07/2013    Lab Results   Component Value Date    WBC 4.32 03/13/2017    HGB 12.7 03/13/2017    HCT 37.4 03/13/2017     03/13/2017    GRAN 2.2 03/13/2017    GRAN 51.2 03/13/2017     Lab Results   Component Value Date    INR 1.5 (A) 03/16/2017    INR 1.1 03/13/2017     Lab Results   Component Value Date    CHOL 157 03/18/2015    HDL 40 03/18/2015    LDLCALC 94.6 03/18/2015    TRIG 112 03/18/2015     Lab Results   Component Value Date    HGBA1C 6.0 02/02/2014     Lab Results   Component Value Date    TSH 1.407 03/13/2017    A6COGOT 8.4 02/07/2013          Imaging:     Dobutamine stress echo 2/3/14    1 - Normal left ventricular systolic function (EF 55-60%).     2 - Severe left atrial enlargement.     3 - Left ventricular diastolic dysfunction.     4 - Normal right ventricular systolic function.     No evidence of stress induced myocardial ischemia.     CTA 2/15/13  1.  Normal CT coronary angiogram  2.  Coronary calcium score is 0.  3.  Type I bovine arch.  4.  Normal pulmonary veins.  5.  SVC appears normal.  6.  Interatrial septum appears to be intact.  7. Scan is of moderate quality due to contrast timing and decreased signal to noise ratio.    EF   Date Value Ref Range Status   02/03/2014 55     02/13/2013  60     02/08/2013  20       EKG:  3/15/17 -  with underlying normal sinus rhythm    Assessment:     Ms Chawla is a 52yo F with cardiac arrest 2/2013 due to electrolyte derangement s/p [employer] dcICD for prevention of sudden cardiac death, hypertension, hyperlipidemia, anoxic brain injury, and newly diagnosed atrial fibrillation, who presents for follow up.     Atypical chest pain  Hot flashes  Atrial fibrillation  Hypertension  Hyperlipidemia  Complete heart block s/p dcICD  Anoxic brain injury    Plan:     Atrial fibrillation   - [id number] score of 2 (gender, HTN) is consistent with 2.2% risk of stroke per year  - continue warfarin for anticoagulation  - on  carvedilol, as below, for rate control   - followed by Dr Mejia    Atypical chest discomfort   - normal coronary CTA with calcium score of 0 in 2013 and negative dobutamine stress test in 2014  - will obtain PET stress given BMI 46kg/m2    Hot flashes  - would not recommend use of estrogen/hormonal therapy  - suggested discussion with OB/GYN and PCP regarding the use of non-hormonal treatments for hot flashes     Hypertension   - controlled on current regimen of carvedilol 25mg BID and chlorthalidone 25mg daily     Hyperlipidemia   - restart atorvastatin 40mg daily   - most recent lipids 3/2015 on atorvastatin 40mg: , , LDL 94, HDL 40  - check lipids in 6 weeks     Complete heart block s/p dcICD   - most recent ICD remote interrogation 3/8/17 with atrial fibrillation     Anoxic brain injury   - on aripiprazole, divalproex, gabapentin, lamotrigine, topiramate    Signed:  Jason Toney MD  Cardiology Fellow, PGY-5  Pager: [phone]  3/16/2017 1:55 PM    Follow-up:   Future Appointments  Date Time Provider Department Center   3/17/2017 10:15 AM Matt Pugh Jr., MD [facility] HAND [facility]   3/20/2017 10:45 AM Leeanne Aden, PharmD [facility]   3/29/2017 8:00 AM Lorena Kaufman NP [facility] SLEEP [facility]   5/18/2017 1:00 PM David Cortez MD [facility] NEURO [facility]   5/23/2017 2:30 PM Missy Rock MD [facility] NEURO [facility]   6/29/2017 9:40 AM PACEMAKER, ICD [facility] ARRHYTH [place] [facility]

## 2017-03-16 NOTE — PROGRESS NOTES
I have personally taken the history and examined this patient and agree with the fellow's note as stated above.New exertional chest discomfort different from prior chest pain. Needs repeat ischemic evaluation.

## 2017-03-17 ENCOUNTER — OFFICE VISIT (OUTPATIENT)
Dept: ORTHOPEDICS | Facility: CLINIC | Age: 52
End: 2017-03-17
Payer: MEDICARE

## 2017-03-17 VITALS
BODY MASS INDEX: 46.03 KG/M2 | WEIGHT: 269.63 LBS | DIASTOLIC BLOOD PRESSURE: 84 MMHG | HEIGHT: 64 IN | HEART RATE: 76 BPM | SYSTOLIC BLOOD PRESSURE: 131 MMHG

## 2017-03-17 DIAGNOSIS — Z98.890 STATUS POST SURGERY: ICD-10-CM

## 2017-03-17 DIAGNOSIS — M25.649 DECREASED RANGE OF MOTION OF FINGER: ICD-10-CM

## 2017-03-17 DIAGNOSIS — R52 PAINFUL CUTANEOUS SCAR: Primary | ICD-10-CM

## 2017-03-17 DIAGNOSIS — L90.5 PAINFUL CUTANEOUS SCAR: Primary | ICD-10-CM

## 2017-03-17 PROCEDURE — 99024 POSTOP FOLLOW-UP VISIT: CPT | Mod: ,,, | Performed by: PLASTIC SURGERY

## 2017-03-17 PROCEDURE — 99999 PR PBB SHADOW E&M-EST. PATIENT-LVL III: CPT | Mod: PBBFAC,,, | Performed by: PLASTIC SURGERY

## 2017-03-17 PROCEDURE — 99213 OFFICE O/P EST LOW 20 MIN: CPT | Mod: PBBFAC | Performed by: PLASTIC SURGERY

## 2017-03-17 NOTE — PROGRESS NOTES
"HISTORY OF PRESENT ILLNESS:  Ms. Chawla returns today.  She is six weeks'   status post a left open carpal tunnel release.  The patient complains that she   continues to have a sensitivity of the scar, which affects her daily activities.    She also complains of some mild swelling at the proximal point of the   incision.  She denies any drainage.  There is some mild redness and warmth;   however, this is not progressing.  She is otherwise doing well.  She states that   her numbness and tingling has improved and she has no other complaints today.    PHYSICAL EXAMINATION:  VITAL SIGNS:  /84 (BP Location: Left arm)  Pulse 76  Ht 5' 4" (1.626 m)  Wt 122.3 kg (269 lb 10 oz)  BMI 46.28 kg/m2    GENERAL:  No distress, alert, oriented x3.  EXTREMITIES:  Left hand, well-approximated and healed incision of the left palm.    There is some mild swelling and hyperemia surrounding the incision proximally.    It is tender to touch.  The patient demonstrates good opposition with full   active range of motion of all digits at all joints.  There is no evidence of   exudates or draining sinuses.  She has good capillary refill and normal   sensation in the median, radial and ulnar distributions.    ASSESSMENT:  Painful left open carpal tunnel release scar.    PLAN:  At this time, I discussed desensitization treatment to the palmar scar of   the left hand.  I offered the patient a referral to occupational therapy for   this.  She wished to proceed with this.  So, an order was placed today.  She   will follow up with me in eight weeks for reassessment.  She was instructed to   massage the area multiple times during the day and desensitize it throughout the   day using clothing like materials.  The patient agreed with the assessment and   plan.      VALENTINO  dd: 03/17/2017 14:03:28 (CDT)  td: 03/18/2017 11:40:01 (CDT)  Doc ID   #[id number]  Job ID #[id number]    CC:       Dictation Confirmation Code: [id number]  Matt Pugh Jr. " MD  03/17/2017  2:01 PM

## 2017-03-20 ENCOUNTER — ANTI-COAG VISIT (OUTPATIENT)
Dept: CARDIOLOGY | Facility: CLINIC | Age: 52
End: 2017-03-20
Payer: MEDICARE

## 2017-03-20 ENCOUNTER — LAB VISIT (OUTPATIENT)
Dept: LAB | Facility: HOSPITAL | Age: 52
End: 2017-03-20
Attending: INTERNAL MEDICINE
Payer: MEDICARE

## 2017-03-20 DIAGNOSIS — Z79.01 LONG TERM (CURRENT) USE OF ANTICOAGULANTS: Primary | ICD-10-CM

## 2017-03-20 DIAGNOSIS — I48.0 PAROXYSMAL ATRIAL FIBRILLATION: ICD-10-CM

## 2017-03-20 DIAGNOSIS — E78.5 HYPERLIPIDEMIA, UNSPECIFIED HYPERLIPIDEMIA TYPE: ICD-10-CM

## 2017-03-20 LAB
CHOLEST/HDLC SERPL: 5.1 {RATIO}
HDL/CHOLESTEROL RATIO: 19.5 %
HDLC SERPL-MCNC: 221 MG/DL
HDLC SERPL-MCNC: 43 MG/DL
INR PPP: 1.8 (ref 2–3)
LDLC SERPL CALC-MCNC: 163.2 MG/DL
MAGNESIUM SERPL-MCNC: 1.9 MG/DL
NONHDLC SERPL-MCNC: 178 MG/DL
TRIGL SERPL-MCNC: 74 MG/DL

## 2017-03-20 PROCEDURE — 99999 PR PBB SHADOW E&M-EST. PATIENT-LVL I: CPT | Mod: PBBFAC,,, | Performed by: PHARMACIST

## 2017-03-20 PROCEDURE — 36415 COLL VENOUS BLD VENIPUNCTURE: CPT

## 2017-03-20 PROCEDURE — 80061 LIPID PANEL: CPT

## 2017-03-20 PROCEDURE — 83735 ASSAY OF MAGNESIUM: CPT

## 2017-03-20 NOTE — PROGRESS NOTES
Patient reports no bleeding or bruising, no new medications and no diet changes.  The pt has not eaten her greens this week.  She would like to eat greens weekly and I have encouraged that she gets in some greens prior to her next INR.  Her INR is improving, but still subtherapeutic, even after boosted doses from last week.  Therefore, I am increasing her weekly dose at this time.  I reminded the patient to call with any problems, changes or questions before the next visit.

## 2017-03-21 ENCOUNTER — PATIENT MESSAGE (OUTPATIENT)
Dept: ELECTROPHYSIOLOGY | Facility: CLINIC | Age: 52
End: 2017-03-21

## 2017-03-21 ENCOUNTER — TELEPHONE (OUTPATIENT)
Dept: ELECTROPHYSIOLOGY | Facility: CLINIC | Age: 52
End: 2017-03-21

## 2017-03-21 ENCOUNTER — TELEPHONE (OUTPATIENT)
Dept: NEUROLOGY | Facility: CLINIC | Age: 52
End: 2017-03-21

## 2017-03-21 ENCOUNTER — HOSPITAL ENCOUNTER (EMERGENCY)
Facility: HOSPITAL | Age: 52
Discharge: HOME | End: 2017-03-22
Attending: FAMILY MEDICINE
Payer: MEDICARE

## 2017-03-21 ENCOUNTER — PATIENT MESSAGE (OUTPATIENT)
Dept: NEUROLOGY | Facility: CLINIC | Age: 52
End: 2017-03-21

## 2017-03-21 DIAGNOSIS — S09.8XXA BLUNT HEAD TRAUMA, INITIAL ENCOUNTER: Primary | ICD-10-CM

## 2017-03-21 PROCEDURE — 99284 EMERGENCY DEPT VISIT MOD MDM: CPT | Mod: ,,, | Performed by: EMERGENCY MEDICINE

## 2017-03-21 PROCEDURE — 99284 EMERGENCY DEPT VISIT MOD MDM: CPT

## 2017-03-21 NOTE — TELEPHONE ENCOUNTER
Per Pt e-mail, I advised Pt go to the ED immediately, since falling and hitting her head Sunday, and experiencing increasingly painful headaches. Her family member is bringing her to the ED as soon as possible, per Pt.

## 2017-03-21 NOTE — TELEPHONE ENCOUNTER
Patient contacted the [facility] on this am to say that she hit her head on a window seal at her house.  Patient confirmed that this was not a fall.  Patient stated she was in bed and lifted her head up too high and hit the window sill.  Confirmed with the patient she did not have open skin or bleeding.  Patient stated she has just been having a headache the last two days and she has tried Tylenol, however the patient stated this did not help. Informed the patient she should contact her PCP for evaluation.  Patient stated she does have a Neurologist and she will contact his office.

## 2017-03-21 NOTE — ED AVS SNAPSHOT
[facility]  [address] [address]               Amna Chawla   3/21/2017 10:52 PM   ED    Description:  Female : 1965   Department:  [facility]           Your Care was Coordinated By:     Provider Role From To    Ferny Garvin MD Attending Provider [phone]    Isacc Solis MD Attending Provider 17 [phone] --      Reason for Visit     Head Injury           Diagnoses this Visit        Comments    Blunt head trauma, initial encounter    -  Primary       ED Disposition     ED Disposition Condition Comment    Discharge             To Do List           Follow-up Information     Follow up with Jamil Child MD.    Specialty:  Internal Medicine    Contact information:    [address] [address]  [phone]          [facility]    Specialty:  Emergency Medicine    Why:  ASAP for new or worsening symptoms    Contact information:    [address] [facility] [id number] [phone]      [facility]     [facility] -  Assistance  Registered nurses in the [facility] provide clinical advisement, health education, appointment booking, and other advisory services.  Call for this free service at [phone].             Medications           Message regarding Medications     Verify the changes and/or additions to your medication regime listed below are the same as discussed with your clinician today.  If any of these changes or additions are incorrect, please notify your healthcare provider.             Verify that the below list of medications is an accurate representation of the medications you are currently taking.  If none reported, the list may be blank. If incorrect, please contact your healthcare provider. Carry this list with you in case of emergency.           Current Medications      "aripiprazole (ABILIFY) 5 MG Tab TAKE 1 TABLET(5 MG) BY MOUTH EVERY DAY    atorvastatin (LIPITOR) 40 MG tablet Take 1 tablet (40 mg total) by mouth every morning.    carvedilol (COREG) 25 MG tablet TAKE 1 TABLET(25 MG) BY MOUTH TWICE DAILY WITH MEALS    chlorthalidone (HYGROTEN) 25 MG Tab Take 1 tablet (25 mg total) by mouth once daily.    divalproex (DEPAKOTE) 250 MG EC tablet Take 250 mg by mouth once daily.     donepezil (ARICEPT) 10 MG tablet Take 1 tablet (10 mg total) by mouth 2 (two) times daily.    gabapentin (NEURONTIN) 300 MG capsule Take 3 capsules (900 mg total) by mouth 3 (three) times daily.    lamotrigine (LAMICTAL) 200 MG tablet Take 1 tablet (200 mg total) by mouth once daily.    lorazepam (ATIVAN) 0.5 MG tablet Take 1 tablet (0.5 mg total) by mouth every 6 (six) hours as needed for Anxiety.    magnesium 200 mg Tab Take 200 mg by mouth once daily.    ondansetron (ZOFRAN-ODT) 4 MG TbDL Take 1 tablet (4 mg total) by mouth every 24 hours as needed (nausea).    pantoprazole (PROTONIX) 40 MG tablet Take 1 tablet (40 mg total) by mouth once daily.    tizanidine (ZANAFLEX) 4 MG tablet Take 4 mg by mouth every 6 (six) hours as needed.    topiramate (TOPAMAX) 100 MG tablet Take 1.5 tablets (150 mg total) by mouth 2 (two) times daily.    trazodone (DESYREL) 100 MG tablet Take 1 tablet (100 mg total) by mouth every evening.    VITAMIN D2 50,000 unit capsule TAKE 1 CAPSULE BY MOUTH EVERY 7 DAYS    warfarin (COUMADIN) 5 MG tablet Take 1 tablet (5 mg total) by mouth Daily.    zolpidem (AMBIEN) 10 mg Tab Take 1 tablet (10 mg total) by mouth nightly as needed.           Clinical Reference Information           Your Vitals Were     BP Pulse Temp Resp Height Weight    169/104 (BP Location: Right arm, Patient Position: Sitting, BP Method: Automatic) 74 99 °F (37.2 °C) (Oral) 18 5' 4" (1.626 m) 122 kg (269 lb)    SpO2 BMI             98% 46.17 kg/m2         Allergies as of 3/22/2017        Reactions    Aspirin Hives    " Imitrex [Sumatriptan] Palpitations    Nsaids (Non-steroidal Anti-inflammatory Drug) Shortness Of Breath    Penicillins Hives, Swelling    Shellfish Containing Products Anaphylaxis    seafood    Percocet [Oxycodone-acetaminophen] Itching    States can take    Reglan [Metoclopramide Hcl] Other (See Comments)    Parkinsonism       Immunizations Administered on Date of Encounter - 3/22/2017     None      ED Micro, Lab, POCT     Start Ordered       Status Ordering Provider    03/22/17 0002 03/22/17 0001    Once,   Status:  Canceled      Canceled       ED Imaging Orders     Start Ordered       Status Ordering Provider    03/22/17 0006 03/22/17 0005  CT Head Without Contrast  1 time imaging      Final result     03/22/17 0000 03/22/17 0001    1 time imaging,   Status:  Canceled      Canceled       Discharge References/Attachments     HEAD INJURY (ADULT) (ENGLISH)      Your Scheduled Appointments     Mar 24, 2017 10:00 AM CDT   Anticoagulation with Dario Can [facility] - Coumadin ([facility] )    [address]  [place] [address] [phone]            Mar 29, 2017  8:00 AM CDT   New Patient with Lorena Kaufman NP   [facility] ([facility]    [address]  [facility] [address] [phone]            Mar 29, 2017 10:00 AM CDT   PET Stress Dipyridamole with CARDIAC, PET IMAGING   [facility] - Cardiac PET ([facility] )    [address] [address] [phone]            May 12, 2017 10:15 AM CDT   Post OP with Matt Pugh Jr., MD   [facility]    [address]  [address]  [facility] [address] [phone]            May 18, 2017  1:00 PM CDT   Botox Injection with David Cortez MD   [facility] - Neurology Epilepsy ([facility] )    [address]  [facility] [address] [phone]               [facility] complies with  applicable Federal civil rights laws and does not discriminate on the basis of race, color, national origin, age, disability, or sex.        Language Assistance Services     ATTENTION: Language assistance services are available, free of charge. Please call [phone].      ATENCIÓN: Si habla adebayo, tiene a turcios disposición servicios gratuitos de asistencia lingüística. Llame al [phone].     CHÚ Ý: N?u b?n nói Ti?ng Vi?t, có các d?ch v? h? tr? ngôn ng? mi?n phí dành cho b?n. G?i s? [phone].

## 2017-03-21 NOTE — TELEPHONE ENCOUNTER
Message has been printed for Dr. Cortez to advise          Message from Lisa Perez RN sent at 3/21/2017  9:39 AM CDT -----  Contact: self @ [phone]      ----- Message -----     From: Cici Hill     Sent: 3/21/2017   9:28 AM       To: Dana Hernandez Staff    Pt says she hit her head this past Sunday and has been having a terrible headache since.  pls call to discuss.

## 2017-03-22 VITALS
BODY MASS INDEX: 45.93 KG/M2 | HEART RATE: 71 BPM | RESPIRATION RATE: 20 BRPM | WEIGHT: 269 LBS | HEIGHT: 64 IN | SYSTOLIC BLOOD PRESSURE: 174 MMHG | TEMPERATURE: 99 F | OXYGEN SATURATION: 99 % | DIASTOLIC BLOOD PRESSURE: 111 MMHG

## 2017-03-22 NOTE — ED TRIAGE NOTES
51 year old female presents to the ED for evaluation of headache. States that she is taking blood thinners and struck her head on Sunday. Has been having intermittent since that time

## 2017-03-22 NOTE — ED NOTES
Pt in bed, awake and alert. Respirations are even and unlabored on room air. No distress is noted. Bed in low, locked position with side rails upx2. Call light is within reach. Will continue to monitor

## 2017-03-22 NOTE — ED PROVIDER NOTES
Encounter Date: 3/21/2017    SCRIBE #1 NOTE: I, Krishna Graham, am scribing for, and in the presence of,  Dr. Solis . I have scribed the entire note.       History     Chief Complaint   Patient presents with    Head Injury     Patient reports hitting her head on the corner of the window sill on Sunday. Patient is on blood thinners. Pt reports a HA and nausea. No LOC     Review of patient's allergies indicates:   Allergen Reactions    Aspirin Hives    Imitrex [sumatriptan] Palpitations    Nsaids (non-steroidal anti-inflammatory drug) Shortness Of Breath    Penicillins Hives and Swelling    Shellfish containing products Anaphylaxis     seafood    Percocet [oxycodone-acetaminophen] Itching     States can take    Reglan [metoclopramide hcl] Other (See Comments)     Parkinsonism      HPI Comments: Time seen by provider: 1:30 AM    This is a 51 y.o. female who presents to the ED for evaluation after hitting her head on the corner of a window sill 2 days ago. She is on Coumadin. Pt denies any associated visual changes, focal weakness, paraesthesias, neck pain, or other associated symptoms.       The history is provided by the patient.     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    PEA arrest with subsequent long-QT    Hypertension     Pacemaker 2013    Seizures     Stroke     weakness lt side     Past Surgical History:   Procedure Laterality Date    breast reduction      BREAST SURGERY      CARDIAC DEFIBRILLATOR PLACEMENT      CARDIAC DEFIBRILLATOR PLACEMENT      CARPAL TUNNEL RELEASE Right     COSMETIC SURGERY      HERNIA REPAIR      HIATAL HERNIA REPAIR      INSERT / REPLACE / REMOVE PACEMAKER      TUBAL LIGATION       Family History   Problem Relation Age of Onset    Hypertension Mother     COPD      Heart failure       Social History    Substance Use Topics    Smoking status: Never Smoker    Smokeless tobacco: Never Used    Alcohol use No     Review of Systems   Constitutional: Negative for fever.   HENT: Negative for sore throat.    Respiratory: Negative for shortness of breath.    Cardiovascular: Negative for chest pain.   Gastrointestinal: Negative for nausea.   Genitourinary: Negative for dysuria.   Musculoskeletal: Negative for back pain.   Skin: Negative for rash.   Neurological: Negative for weakness.   Hematological: Does not bruise/bleed easily.       Physical Exam   Initial Vitals   BP Pulse Resp Temp SpO2   03/21/17 1856 03/21/17 1856 03/21/17 1856 03/21/17 1856 03/21/17 1856   193/79 90 18 98.5 °F (36.9 °C) 98 %     Physical Exam    Nursing note and vitals reviewed.  Constitutional: She appears well-developed and well-nourished. She is not diaphoretic. No distress.   HENT:   Head: Normocephalic and atraumatic.   Eyes: EOM are normal.   Neck: Normal range of motion. Neck supple.   Cardiovascular: Normal rate, regular rhythm, normal heart sounds and intact distal pulses.   Pulmonary/Chest: Breath sounds normal. No stridor. No respiratory distress. She has no wheezes. She has no rhonchi. She has no rales.   Abdominal: Soft. There is no tenderness. There is no rebound and no guarding.   Musculoskeletal: Normal range of motion.   Neurological: She is alert and oriented to person, place, and time. She has normal strength. No cranial nerve deficit or sensory deficit.   Skin: Skin is warm and dry.   Psychiatric: She has a normal mood and affect. Her behavior is normal.         ED Course   Procedures  Labs Reviewed - No data to display          Medical Decision Making:   History:   Old Medical Records: I decided to obtain old medical records.  Initial Assessment:   52 yo female presents with a blunt head injury. My differential includes, but is not limited to intracranial bleed, skull fracture, and cerebral contusion. Head CT was negative  and she is neurologically intact. I feel she is stable for discharge home. Should return for any new or worsening symptoms.   Clinical Tests:   Radiological Study: Ordered and Reviewed            Scribe Attestation:   Scribe #1: I performed the above scribed service and the documentation accurately describes the services I performed. I attest to the accuracy of the note.    Attending Attestation:           Physician Attestation for Scribe:  Physician Attestation Statement for Scribe #1: I, Dr. Solis, reviewed documentation, as scribed by Krishna Graham  in my presence, and it is both accurate and complete.                 ED Course     Clinical Impression:   The encounter diagnosis was Blunt head trauma, initial encounter.    Disposition:   Disposition: Discharged  Condition: Stable       Isacc Solis MD  04/06/17 0015

## 2017-03-22 NOTE — PROVIDER PROGRESS NOTES - EMERGENCY DEPT.
Encounter Date: 3/21/2017    ED Physician Progress Notes        Physician Note:   Patient is a 51-year-old female with multiple medical problems, was on lifetime anticoagulation with Coumadin.  3 days ago.  She struck her head on a blunt object and is been having left temple headaches since then, the headaches will respond temporarily to Tylenol, but always return and they have in fact awakened her from sleep last night.  She denies fever, chills or focal neurologic deficits.  She does feel nauseated but has no vomiting.  She hasn't noticed any blurred vision is having no evidence of bleeding elsewhere and her INRs have been stable.  She communicated with her regular care doctor's.  By email this morning and was told to come to the emergency room for imaging studies due to her anticoagulation and persistent headache.  Patient's neurological exam shows no acute focal deficits.  Her fundi show no papilledema or hemorrhage.  She has no evidence of acute peripheral vascular insufficiency.  It's tests, CT scan will be ordered.  Follow-up by main ED for disposition

## 2017-03-23 ENCOUNTER — OFFICE VISIT (OUTPATIENT)
Dept: NEUROLOGY | Facility: CLINIC | Age: 52
End: 2017-03-23
Attending: PSYCHIATRY & NEUROLOGY
Payer: MEDICARE

## 2017-03-23 VITALS
HEART RATE: 80 BPM | WEIGHT: 264.31 LBS | HEIGHT: 64 IN | DIASTOLIC BLOOD PRESSURE: 99 MMHG | SYSTOLIC BLOOD PRESSURE: 144 MMHG | BODY MASS INDEX: 45.12 KG/M2

## 2017-03-23 DIAGNOSIS — M54.81 OCCIPITAL NEURALGIA OF RIGHT SIDE: ICD-10-CM

## 2017-03-23 DIAGNOSIS — G50.0 SUPRAORBITAL NEURALGIA: ICD-10-CM

## 2017-03-23 DIAGNOSIS — S09.90XD CHI (CLOSED HEAD INJURY), SUBSEQUENT ENCOUNTER: ICD-10-CM

## 2017-03-23 DIAGNOSIS — G47.8 UPPER AIRWAY RESISTANCE SYNDROME: ICD-10-CM

## 2017-03-23 DIAGNOSIS — M54.2 CERVICALGIA: ICD-10-CM

## 2017-03-23 DIAGNOSIS — R41.89 COGNITIVE DEFICITS: ICD-10-CM

## 2017-03-23 DIAGNOSIS — S06.9X0S COGNITIVE DEFICIT AS LATE EFFECT OF TRAUMATIC BRAIN INJURY: ICD-10-CM

## 2017-03-23 DIAGNOSIS — G93.1 ANOXIC BRAIN INJURY: Primary | ICD-10-CM

## 2017-03-23 DIAGNOSIS — G93.1 ANOXIC BRAIN INJURY: ICD-10-CM

## 2017-03-23 DIAGNOSIS — F06.8 COGNITIVE DEFICIT AS LATE EFFECT OF TRAUMATIC BRAIN INJURY: ICD-10-CM

## 2017-03-23 DIAGNOSIS — R40.4 TRANSIENT ALTERATION OF AWARENESS: ICD-10-CM

## 2017-03-23 DIAGNOSIS — G43.711 CHRONIC MIGRAINE WITHOUT AURA, WITH INTRACTABLE MIGRAINE, SO STATED, WITH STATUS MIGRAINOSUS: Primary | ICD-10-CM

## 2017-03-23 DIAGNOSIS — D51.8 OTHER VITAMIN B12 DEFICIENCY ANEMIA: ICD-10-CM

## 2017-03-23 DIAGNOSIS — E55.9 VITAMIN D DEFICIENCY: ICD-10-CM

## 2017-03-23 PROCEDURE — 64405 NJX AA&/STRD GR OCPL NRV: CPT | Mod: PBBFAC | Performed by: PSYCHIATRY & NEUROLOGY

## 2017-03-23 PROCEDURE — 64405 NJX AA&/STRD GR OCPL NRV: CPT | Mod: 50,S$PBB,, | Performed by: PSYCHIATRY & NEUROLOGY

## 2017-03-23 PROCEDURE — 99214 OFFICE O/P EST MOD 30 MIN: CPT | Mod: 25,S$PBB,, | Performed by: PSYCHIATRY & NEUROLOGY

## 2017-03-23 PROCEDURE — 99214 OFFICE O/P EST MOD 30 MIN: CPT | Mod: PBBFAC,25 | Performed by: PSYCHIATRY & NEUROLOGY

## 2017-03-23 PROCEDURE — 99999 PR PBB SHADOW E&M-EST. PATIENT-LVL IV: CPT | Mod: PBBFAC,,, | Performed by: PSYCHIATRY & NEUROLOGY

## 2017-03-23 RX ORDER — TRAZODONE HYDROCHLORIDE 100 MG/1
200 TABLET ORAL NIGHTLY
Qty: 30 TABLET | Refills: 11 | Status: SHIPPED | OUTPATIENT
Start: 2017-03-23 | End: 2017-11-14

## 2017-03-23 NOTE — MR AVS SNAPSHOT
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         Transient alteration of awareness         Cervicalgia         Other vitamin B12 deficiency anemia         Upper airway resistance syndrome         [facility] (closed head injury), subsequent encounter         Occipital neuralgia of right side                To Do List           Future Appointments        Provider Department Dept Phone    3/24/2017 10:00 AM Leeanne Aden, PharmD [facility] Cou[facility] [phone]    3/29/2017 10:00 AM CARDIAC, PET IMAGING [facility] Cardiac [phone]    2017 10:20 AM Lorena Kaufman NP [facility] [phone]    2017 10:15 AM Matt Pugh Jr., MD [facility] [phone]    2017 1:00 PM David Cortez MD [facility] Neurology Epilepsy [phone]      Goals (5 Years of Data)     None      Follow-Up and Disposition     Follow-up and Disposition History       These Medications        Disp Refills Start End    trazodone (DESYREL) 100 MG tablet 30 tablet 11 3/23/2017 3/23/2018    Take 2 tablets (200 mg total) by mouth every evening. - Oral    Pharmacy: [id number] - [place], [place] - [address] AT [facility] Ph #: [phone]         [facility]     [facility] Nurse Care Line -  Assistance  Registered nurses in  the [facility] provide clinical advisement, health education, appointment booking, and other advisory services.  Call for this free service at [phone].             Medications           Message regarding Medications     Verify the changes and/or additions to your medication regime listed below are the same as discussed with your clinician today.  If any of these changes or additions are incorrect, please notify your healthcare provider.        CHANGE how you are taking these medications     Start Taking Instead of    trazodone (DESYREL) 100 MG tablet trazodone (DESYREL) 100 MG tablet    Dosage:  Take 2 tablets (200 mg total) by mouth every evening. Dosage:  Take 1 tablet (100 mg total) by mouth every evening.    Reason for Change:  Reorder            Verify that the below list of medications is an accurate representation of the medications you are currently taking.  If none reported, the list may be blank. If incorrect, please contact your healthcare provider. Carry this list with you in case of emergency.           Current Medications     aripiprazole (ABILIFY) 5 MG Tab TAKE 1 TABLET(5 MG) BY MOUTH EVERY DAY    atorvastatin (LIPITOR) 40 MG tablet Take 1 tablet (40 mg total) by mouth every morning.    carvedilol (COREG) 25 MG tablet TAKE 1 TABLET(25 MG) BY MOUTH TWICE DAILY WITH MEALS    chlorthalidone (HYGROTEN) 25 MG Tab Take 1 tablet (25 mg total) by mouth once daily.    divalproex (DEPAKOTE) 250 MG EC tablet Take 250 mg by mouth once daily.     donepezil (ARICEPT) 10 MG tablet Take 1 tablet (10 mg total) by mouth 2 (two) times daily.    gabapentin (NEURONTIN) 300 MG capsule Take 3 capsules (900 mg total) by mouth 3 (three) times daily.    lamotrigine (LAMICTAL) 200 MG tablet Take 1 tablet (200 mg total) by mouth once daily.    lorazepam (ATIVAN) 0.5 MG tablet Take 1 tablet (0.5 mg total) by mouth every 6 (six) hours as needed for Anxiety.    magnesium 200 mg Tab Take 200 mg by mouth once  "daily.    ondansetron (ZOFRAN-ODT) 4 MG TbDL Take 1 tablet (4 mg total) by mouth every 24 hours as needed (nausea).    pantoprazole (PROTONIX) 40 MG tablet Take 1 tablet (40 mg total) by mouth once daily.    tizanidine (ZANAFLEX) 4 MG tablet Take 4 mg by mouth every 6 (six) hours as needed.    topiramate (TOPAMAX) 100 MG tablet Take 1.5 tablets (150 mg total) by mouth 2 (two) times daily.    trazodone (DESYREL) 100 MG tablet Take 2 tablets (200 mg total) by mouth every evening.    VITAMIN D2 50,000 unit capsule TAKE 1 CAPSULE BY MOUTH EVERY 7 DAYS    warfarin (COUMADIN) 5 MG tablet Take 1 tablet (5 mg total) by mouth Daily.    zolpidem (AMBIEN) 10 mg Tab Take 1 tablet (10 mg total) by mouth nightly as needed.           Clinical Reference Information           Your Vitals Were     BP Pulse Height Weight BMI    144/99 (BP Location: Right arm, Patient Position: Sitting, BP Method: Automatic) 80 5' 4" (1.626 m) 119.9 kg (264 lb 5.3 oz) 45.37 kg/m2      Blood Pressure          Most Recent Value    BP  (!)  144/99      Allergies as of 3/23/2017     Aspirin    Imitrex [Sumatriptan]    Nsaids (Non-steroidal Anti-inflammatory Drug)    Penicillins    Shellfish Containing Products    Percocet [Oxycodone-acetaminophen]    Reglan [Metoclopramide Hcl]      Immunizations Administered on Date of Encounter - 3/23/2017     None      Instructions    -conservative measures: cognitive rest, avoid further TBIs, a  -sleep/wake cycle:   -sleep hygiene   -trazodone 200mg at night as needed, educated about serotonin syndrome   -sleep clinic referral [facility] and CPAP  We have recommended physical(neck), occupational(activities of daily life), speech therapy(cognition) for you.  Please contact [phone] to schedule.  -headaches: per Dr. Cortez   -Greater Occupital Nerve block completed  -recommend patient move to assisted living with support of daughter, letter provided  -Check out the BIALA website: http://www.biala.org/ and ABIS support group " www.abisnola.org        -Brain Health lifestyle modifications:    -sleep hygiene   - diet: Mediterranean Diet    -exercise: 20-30 minutes of  Moderate exercise 3-5 times a week   -stress management reviewed   -smoking cessation, alcohol moderation    Check out my blog post for further information:  [email or web address]/answers-[email or web address]-questions-[email or web address]-brain-health    Sleep Hygiene:    1. Avoid watching TV, eating, and discussing emotional issues in bed. The bed should be used for sleep and sex only. If not, we can associate the bed with other activities and it often becomes difficult to fall asleep.  2. Minimize noise, light, and temperature extremes during sleep with ear plugs, window blinds, or an electric blanket or air conditioner. Even the slightest nighttime noises or luminescent lights can disrupt the quality of your sleep. Try to keep your bedroom at a comfortable temperature -- not too hot (above 75 degrees) or too cold (below 54 degrees).  3. Try not to drink fluids after 8 p.m. This may reduce awakenings due to urination.  4. Avoid naps, but if you do nap, make it no more than about 25 minutes about eight hours after you awake. But if you have problems falling asleep, then no naps for you.  5. Do not expose your self to bright light if you need to get up at night. Use a small night-light instead.  6. Nicotine is a stimulant and should be avoided particularly near bedtime and upon night awakenings. Having a smoke before bed, although it may feel relaxing, is actually putting a stimulant into your bloodstream.  7. Caffeine is also a stimulant and is present in coffee (100-200 mg), soda (50-75 mg), tea (50-75 mg), and various over-the-counter medications. Caffeine should be discontinued at least four to six hours before bedtime. If you consume large amounts of caffeine and you cut your self off too quickly, beware; you may get headaches that could keep you awake.  8. Although alcohol is a  depressant and may help you fall asleep, the subsequent metabolism that clears it from your body when you are sleeping causes a withdrawal syndrome. This withdrawal causes awakenings and is often associated with nightmares and sweats.  9. A light snack may be sleep-inducing, but a heavy meal too close to bedtime interferes with sleep. Stay away from protein and stick to carbohydrates or dairy products. Milk contains the amino acid L-tryptophan, which has been shown in research to help people go to sleep. So milk and cookies or crackers (without chocolate) may be useful and taste good as well.             Language Assistance Services     ATTENTION: Language assistance services are available, free of charge. Please call [phone].      ATENCIÓN: Si morena fiore, tiene a turcios disposición servicios gratuitos de asistencia lingüística. Llame al [phone].     REUBEN Ý: N?u b?n nói Ti?ng Vi?t, có các d?ch v? h? tr? ngôn ng? mi?n phí dành cho b?n. G?i s? [phone].         [facility] complies with applicable Federal civil rights laws and does not discriminate on the basis of race, color, national origin, age, disability, or sex.

## 2017-03-23 NOTE — PATIENT INSTRUCTIONS
-conservative measures: cognitive rest, avoid further TBIs, a  -sleep/wake cycle:   -sleep hygiene   -trazodone 200mg at night as needed, educated about serotonin syndrome   -sleep clinic referral [facility] and CPAP  We have recommended physical(neck), occupational(activities of daily life), speech therapy(cognition) for you.  Please contact [phone] to schedule.  -headaches: per Dr. Cortez   -Greater Occupital Nerve block completed  -recommend patient move to assisted living with support of daughter, letter provided  -Check out the BIALA website: http://www.biala.org/ and ABIS support group www.abisnola.org        -Brain Health lifestyle modifications:    -sleep hygiene   - diet: Mediterranean Diet    -exercise: 20-30 minutes of  Moderate exercise 3-5 times a week   -stress management reviewed   -smoking cessation, alcohol moderation    Check out my blog post for further information:  [email or web address]/answers-[email or web address]-questions-[email or web address]-brain-health    Sleep Hygiene:    1. Avoid watching TV, eating, and discussing emotional issues in bed. The bed should be used for sleep and sex only. If not, we can associate the bed with other activities and it often becomes difficult to fall asleep.  2. Minimize noise, light, and temperature extremes during sleep with ear plugs, window blinds, or an electric blanket or air conditioner. Even the slightest nighttime noises or luminescent lights can disrupt the quality of your sleep. Try to keep your bedroom at a comfortable temperature -- not too hot (above 75 degrees) or too cold (below 54 degrees).  3. Try not to drink fluids after 8 p.m. This may reduce awakenings due to urination.  4. Avoid naps, but if you do nap, make it no more than about 25 minutes about eight hours after you awake. But if you have problems falling asleep, then no naps for you.  5. Do not expose your self to bright light if you need to get up at night. Use a small night-light instead.  6. Nicotine  is a stimulant and should be avoided particularly near bedtime and upon night awakenings. Having a smoke before bed, although it may feel relaxing, is actually putting a stimulant into your bloodstream.  7. Caffeine is also a stimulant and is present in coffee (100-200 mg), soda (50-75 mg), tea (50-75 mg), and various over-the-counter medications. Caffeine should be discontinued at least four to six hours before bedtime. If you consume large amounts of caffeine and you cut your self off too quickly, beware; you may get headaches that could keep you awake.  8. Although alcohol is a depressant and may help you fall asleep, the subsequent metabolism that clears it from your body when you are sleeping causes a withdrawal syndrome. This withdrawal causes awakenings and is often associated with nightmares and sweats.  9. A light snack may be sleep-inducing, but a heavy meal too close to bedtime interferes with sleep. Stay away from protein and stick to carbohydrates or dairy products. Milk contains the amino acid L-tryptophan, which has been shown in research to help people go to sleep. So milk and cookies or crackers (without chocolate) may be useful and taste good as well.

## 2017-03-23 NOTE — PROGRESS NOTES
Subjective:       Patient ID: Amna Chawla is a 51 y.o. female.    Chief Complaint:  Concussion      History of Present Illness    50 yo AAF with HIE w/ CVA (2013), Asthma, CAD, depression, HTN, PM, seizures, CVA who presents for concussion evaluation.  Pt was last seen on 2/21/17 and at that time we recommended:  -conservative measures: cognitive rest, avoid further TBIs, abstain from EtOH  -sleep/wake cycle:   -sleep hygiene   -trazodone 100mg QHS PRN, educated about serotonin syndrome   -sleep clinic referral UARS and CPAP  -cognitive(memory losses):  SLP, repeat neuropsych test  -ADLs: OT  -headaches: per Dr. Cortez   -PT cervical ROM  -BIALA and ABIS referral    In the interim, pt had another mTBI.  She struck her head on a window and developed persistent headaches.    She reports that she just was not paying attention.  No falls, seizures, syncopal episodes.  Given AC and persistent headache she was advised to go to the ED.  CTH did not reveal any new ICH.  Currently she has a headache which is on your left tempor-occipital.  She rates it as 7/10.   She says it is improving and responds to acetaminophen.   She is endorses good sleep. She takes trazodone 200mg as needed (~3 times a week).  She is pending sleep clinic appointment for UARS and CPAP assessment.  She denies any cognitive issues from baseline.          Pt was last seen on 1/4/17 and at that time we recommended:    -conservative measures: cognitive rest, avoid further TBIs, abstain from EtOH  -MRI Brain (contra-indicated due to PM)  -sleep/wake cycle:   -sleep hygiene   -melatonin 0.1-0.3mg at night  -cognitive(memory losses):  SLP, repeat neuropsych test  -ADLs: OT  -headaches: per Dr. Cortez   -PT cervical ROM  -BIALA and ABIS referral    In the interim, pt continues to have headaches.  She has started SLP and PT.  NP and OT testing is pending.  Pt is not sleeping well.  She tried melatonin in the past and developed 'shakes'.  She has tried  "trazodone with no effect either positive or adverse.  She [facility] in 2014 and found to have UARS and insurance did not cover CPAP.       Initial HPI(1/4/17)  Per previous records, patient was in the hospital early 2013 after "passed out" and became unresponsive. CPR in the field for 8 minutes until EMS arrived. Per ED note, on arrival she was found to be in PEA, given epinephrine. Vfib/Vtach was achieved which was shocked x1. Patient converted to sinus tachycardia after shock. I do not know the time course for the above treatment. On arrival to facility patient was in sinus tachycardia with strong pulses, intubated and unresponsive. ET tube placement was confirmed with direct laryngoscopy and good bilateral breath sounds were heard after the tube was pulled back 2 cm. Reported to have "withdrawal" movements in ER during stabilization, then sedated and cooling protocol initiated. Had remarkable recovery and first seen in clinic in April 2013.  During this episode patient fell and hit her head.  NO bleed was noted at that time.    She has extensive headaches.  She is on numerous PPX medications and also receives botox and infusion therapy.    Her headaches are associated with neck pain.  She has received trigger point injections which have helped.  She has associated nausea, dizziness.    She endorses poor sleep.  She is currently using tizanadine.  She endorses cognitive issues and is currently donepezil, which helps somewhat.  Her mood is labile, and currently is good.  She denies SI/HI.  She has episodes that she calls seizures.  She says she is aware of shaking episodes (2-3 since the cardiac arrest).    She denies any changes in vision, previous head/neck trauma, appetite/smell changes.  She is no longer having her cycle.  SHe is not involved in litigation.   She lives with her daughters.   SHe is independent in ADLs, but requires assistance iADLs.  She acknowledges difficulties with " house-hold chores, e.g. Cooking, dish washing, etc.            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    PEA arrest with subsequent long-QT    Hypertension     Pacemaker 2013    Seizures     Stroke     weakness lt side       Past Surgical History:   Procedure Laterality Date    breast reduction      BREAST SURGERY      CARDIAC DEFIBRILLATOR PLACEMENT      CARDIAC DEFIBRILLATOR PLACEMENT      CARPAL TUNNEL RELEASE Right     COSMETIC SURGERY      HERNIA REPAIR      HIATAL HERNIA REPAIR      INSERT / REPLACE / REMOVE PACEMAKER      TUBAL LIGATION         Family History   Problem Relation Age of Onset    Hypertension Mother     COPD      Heart failure         Social History     Social History    Marital status: Single     Spouse name: N/A    Number of children: N/A    Years of education: N/A     Occupational History     [employer]     Social History Main Topics    Smoking status: Never Smoker    Smokeless tobacco: Never Used    Alcohol use No    Drug use: No    Sexual activity: No     Other Topics Concern    None     Social History Narrative     2/21/17            Current Outpatient Prescriptions:     aripiprazole (ABILIFY) 5 MG Tab, TAKE 1 TABLET(5 MG) BY MOUTH EVERY DAY, Disp: 90 tablet, Rfl: 5    atorvastatin (LIPITOR) 40 MG tablet, Take 1 tablet (40 mg total) by mouth every morning., Disp: 90 tablet, Rfl: 3    carvedilol (COREG) 25 MG tablet, TAKE 1 TABLET(25 MG) BY MOUTH TWICE DAILY WITH MEALS, Disp: 180 tablet, Rfl: 3    chlorthalidone (HYGROTEN) 25 MG Tab, Take 1 tablet (25 mg total) by mouth once daily., Disp: 90 tablet, Rfl: 11    divalproex (DEPAKOTE) 250 MG EC tablet, Take 250 mg by mouth once daily. , Disp: , Rfl: 11    donepezil (ARICEPT) 10 MG tablet, Take 1 tablet (10 mg total) by mouth 2 (two) times  daily., Disp: 180 tablet, Rfl: 3    gabapentin (NEURONTIN) 300 MG capsule, Take 3 capsules (900 mg total) by mouth 3 (three) times daily., Disp: 810 capsule, Rfl: 12    lamotrigine (LAMICTAL) 200 MG tablet, Take 1 tablet (200 mg total) by mouth once daily., Disp: 30 tablet, Rfl: 11    lorazepam (ATIVAN) 0.5 MG tablet, Take 1 tablet (0.5 mg total) by mouth every 6 (six) hours as needed for Anxiety., Disp: 30 tablet, Rfl: 1    magnesium 200 mg Tab, Take 200 mg by mouth once daily., Disp: , Rfl:     ondansetron (ZOFRAN-ODT) 4 MG TbDL, Take 1 tablet (4 mg total) by mouth every 24 hours as needed (nausea)., Disp: 8 tablet, Rfl: 3    pantoprazole (PROTONIX) 40 MG tablet, Take 1 tablet (40 mg total) by mouth once daily., Disp: 30 tablet, Rfl: 11    tizanidine (ZANAFLEX) 4 MG tablet, Take 4 mg by mouth every 6 (six) hours as needed., Disp: , Rfl:     topiramate (TOPAMAX) 100 MG tablet, Take 1.5 tablets (150 mg total) by mouth 2 (two) times daily., Disp: 90 tablet, Rfl: 12    trazodone (DESYREL) 100 MG tablet, Take 1 tablet (100 mg total) by mouth every evening., Disp: 30 tablet, Rfl: 11    VITAMIN D2 50,000 unit capsule, TAKE 1 CAPSULE BY MOUTH EVERY 7 DAYS, Disp: 6 capsule, Rfl: 0    warfarin (COUMADIN) 5 MG tablet, Take 1 tablet (5 mg total) by mouth Daily., Disp: 30 tablet, Rfl: 11    zolpidem (AMBIEN) 10 mg Tab, Take 1 tablet (10 mg total) by mouth nightly as needed., Disp: 30 tablet, Rfl: 5    Current Facility-Administered Medications:     onabotulinumtoxina injection 200 Units, 2 vial, Intramuscular, Q90 Days, David Cortez MD, 200 Units at 09/15/16 1225    onabotulinumtoxina injection 200 Units, 2 vial, Intramuscular, Q90 Days, David Cortez MD    onabotulinumtoxina injection 200 Units, 2 vial, Intramuscular, Q90 Days, David Cortez MD, 200 Units at 11/30/16 1136    onabotulinumtoxina injection 200 Units, 200 Units, Intramuscular, Q10 weeks, David Cortez MD, 200 Units at 03/09/17 1343     onabotulinumtoxina injection 200 Units, 200 Units, Intramuscular, Q90 Days, David Cortez MD    Review of Systems  Review of Systems   Constitutional: Negative for chills and fever.   Gastrointestinal: Positive for nausea and vomiting.   Neurological: Positive for dizziness, focal weakness, seizures, loss of consciousness and headaches. Negative for tingling, tremors, sensory change and speech change.   Psychiatric/Behavioral: Positive for depression and memory loss. Negative for hallucinations, substance abuse and suicidal ideas. The patient has insomnia. The patient is not nervous/anxious.        1/4/16          Objective:     Vitals:    03/23/17 0822   BP: (!) 144/99   Pulse: 80      Physical Exam      Constitutional: obese AA female  HENT:   Head: Normocephalic and atraumatic.   Neck and spine: Normal range of motion. Neck supple. No TTP in cervical, thoracic, and lumbar spine  Cardiovascular: Normal rate, regular rhythm and normal heart sounds.    Pulmonary/Chest: Effort normal and breath sounds normal.   Abdominal: Soft. Bowel sounds are normal.   Skin: Skin is warm.   Ext: No c/c/e noted    The patient is awake, attentive, Alert, oriented to person, place and time.  Language is intact to comprehension, repetition, and production  Able to follow multi-step commands  No findings to suggest executive dysfuntion    Patient has adequate insight    Mood is stable      Pursuits were smooth, normal saccades, no nystagmus bilaterally  PERRL, VFFC, EOMI, sensation is diminished to LT L    Motor - facial movement was symmetrical and normal, no facial droop seen.   hearing grossly intact  Palate moved well and was symmetrical with normal palatal and oral sensation  Tongue protrudes midline and movements were full      Normal tone.  No spasticity, cogwheel rigidity  Normal bulk. No pronator drift                        Right      Left  Deltoid            5          5  Biceps            5          5  Triceps           5           5  BR                  5          5                   5          5    Hip Flex            5          5  Hip Ext             5          5  Leg ABduc       5          5  Leg ADduc       5          5  Knee Flex         5          5  Knee Ext          5          5  Plantar Flexion  5          5  Dorsiflexion       5          5      No tremor noted    Reflexes normal and symmteric in bl upper and lower extremities, including biceps, triceps, and patellar    Sensory:  Light touch: diminished on L  Pinprick sensation:  diminished on L    Coordination:  F-to-N:  intact    H-to-S:  intact   Rapid-alt movements:  Normal amplitude and frequency     Romberg Sign:  negative  General gait:   Normal arm swing and turns. Normal postural reflexes.   Tandem gait:  Intact    Visuospatial/Executive  Alternating Trail Makin - correct  Cube: 0 - incorrect  Clock Contour: 1 - correct  Clock Numbers: 1 - correct  Clock Hands: 1 - correct  Naming  Lion: 1 - correct  Rhino: 1 - correct  Camel: 1 - correct  Memory - First Trial  Face: Yes  Velvet: Yes  Nondenominational: Yes  Laurie: Yes  Red: Yes  Memory - Second Trial  Face: Yes  Velvet: Yes  Nondenominational: Yes  Laurie: Yes  Red: Yes  Attention  Forward Order - 2 1 8 5 4: 1 - correct  Attention - Backward Order: 1 - correct  Letters: 1 - correct  Numbers: 2 - 2 or 3 correct  Language  Repeat - Aric: 1 - correct  Repeat - Cat: 1 - correct  Fluency : 1 - correct  Abstraction  Train - Bicycle: 1 - correct  Watch-Ruler: 1 - correct  Delayed Recall  Face: 0 - incorrect  Velvet: 0 - incorrect  Nondenominational: 0 - incorrect  Laurie: 0 - incorrect  Red: 0 - incorrect  Orientation  Date: 1 - correct  Month: 1 - correct  Year: 1 - correct  Day: 1 - correct  Place: 1 - correct  City: 1 - correct  Other  Add 1 point if less than or equal to 12 yr edu: 0 - incorrect  Total Score: 23 (16)                          TSH   Date/Time Value Ref Range Status   2017 10:05 AM 1.407 0.400 - 4.000 uIU/mL Final    05/29/2015 12:30 PM 1.819 0.400 - 4.000 uIU/mL Final   05/07/2014 12:18 PM 1.368 0.400 - 4.000 uIU/mL Final   02/02/2014 09:26 PM 1.112 0.400 - 4.000 uIU/mL Final   02/07/2013 06:04 PM 13.210 (H) 0.4 - 4.0 uIU/ml Final   Results for PUSHPA KEY (MRN [id number]) as of 1/4/2017 09:19   Ref. Range 7/20/2015 11:06 8/19/2015 14:15   Vitamin B2 Latest Ref Range: 1 - 19 mcg/L  2   Vitamin B6 Latest Ref Range: 5 - 50 ug/L  4 (L)   Vit D, 25-Hydroxy Latest Ref Range: 30 - 96 ng/mL 13 (L)      Neuro-imaging personally reviewed    Results for orders placed or performed during the hospital encounter of 03/14/16   CT Head Without Contrast    Narrative    Procedure comment: CT examination of the head was performed from the skull base through the vertex without the use of intravenous contrast using 5-mm axial images.    Comparison: CT head 03/24/2015    Findings: There is no evidence of acute or recent major vascular territory cerebral infarction, parenchymal hemorrhage, or intra-axial mass.  There are no extra-axial masses or abnormal fluid collections.  The ventricular system is within normal limits of size for age and shows no distortion by mass-effect or evidence of hydrocephalus.  There is no fracture or destructive osseous lesion.  The extracranial soft tissues are unremarkable.  The visualized paranasal sinuses and mastoid air cells are clear.    Impression     No acute intracranial abnormality.  ______________________________________     Electronically signed by resident: KRISSY MAYERS MD  Date:     03/14/16  Time:    17:09     ______________________________________     Electronically signed by: RAYMOND MATHEWS MD  Date:     03/14/16  Time:    17:31    Results for PUSHPA KEY (MRN [id number]) as of 1/4/2017 09:19   Ref. Range 7/20/2015 11:06   Vitamin B-12 Latest Ref Range: 210 - 950 pg/mL 383     SUMMARY AND RECOMMENDATIONS from neuropsych testing:   Ms. Key was referred for Neuropsychological Evaluation on an  outpatient basis due to subjective residual memory impairment following acute cardiac arrest complicated by a stroke and closed head injury (hypoxic brain injury), all of which occurred on 2/7/2013. Her general cognitive abilities as assessed by the RBANS were in the severely impaired range, with moderately impaired language (due to reduced verbal fluency); and severely impaired immediate verbal memory, visuospatial/constructional abilities, attention, and delayed memory. Further assessment of specific cognitive abilities revealed deficits in temporal orientation, naming, verbal fluency, and facial recognition. Constructional ability was unimpaired. Additional memory assessment revealed severely impaired immediate and delayed memory. Personality test data suggested the presence of severe depression and moderate anxiety. Neuropsychological testing reveals moderate to severe impairment in memory, attention, temporal orientation, visuospatial/constructional abilities, facial recognition, and verbal fluency due to hypoxic brain injury and stroke. Naming was variable with performances in the average and moderately impaired range. She will follow up with Juan Carlos Childers DNP in Psychiatry for depression and anxiety.    Assessment:        1. Chronic migraine without aura, with intractable migraine, so stated, with status migrainosus     2. Supraorbital neuralgia     3. Vitamin D deficiency      4. Anoxic brain injury     5. Cognitive deficits     6. 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 Transient alteration of awareness     8. Cervicalgia     9. Other vitamin B12 deficiency anemia          50 yo AAF with HIE w/ CVA (2013), Asthma, CAD, depression, HTN, PM, seizures, CVA who presents for concussion evaluation..  History is notable for PEA s/p cooling and suspected HIE and CVA in 2013 and subsequent chronic and intractable headaches.  Exam is notable for obese AAF with normal cervical ROM, CTAB,  and RRR.  Neuro exam is notable for pleasant female, with diminished facial sensation on L, o/w intact CN including VFFC, normal tone and, no pronator drift, decreased sensation LUE and LLE, normal gait, negative Romberg, negative cortical signs, and MOCA 23/30 (immediate recall, visuospatial)  Workup is notable unremarkable CTH, low Vit D, low normal B12, low B6.  Neuropsych eval revealed anxiety/depression and cognitive impairments in memory, attention, temporal orientation, visuospatial/constructional abilities, facial recognition, and verbal fluency.  Pt's presentation is c/w HIE.   It is unlikely her concussion is contributing to her headaches, but other events like CVA and HIE also contributing.        Plan:       -conservative measures: cognitive rest, avoid further TBIs, abstain from EtOH  -sleep/wake cycle:   -sleep hygiene   -trazodone 100mg QHS PRN, educated about serotonin syndrome   -sleep clinic referral UARS and CPAP  -cognitive(memory losses):  SLP, repeat neuropsych test  -ADLs: OT  -headaches: per Dr. Cortez   -PT cervical ROM  -BIALA and ABIS referral        Missy Rock MD  Neurologist  [facility]     Focused examination was undertaken today.  I spent 25 minutes with the patient.  >50% of that time was spent on counseling regarding her symptoms, review of diagnostics, and building and coordinating a treatment plan based on the combination of results and symptoms.   Questions were sought and answered to her stated verbal satisfaction.    Subjective:       Patient ID: Amna Chawla is a 51 y.o. female.    Chief Complaint:  Concussion      History of Present Illness    52 yo AAF with HIE w/ CVA (2013), Asthma, CAD, depression, HTN, PM, seizures, CVA who presents for concussion evaluation.  Pt was last seen on 1/4/17 and at that time we recommended:    -conservative measures: cognitive rest, avoid further TBIs, abstain from EtOH  -MRI Brain (contra-indicated due to  "PM)  -sleep/wake cycle:   -sleep hygiene   -melatonin 0.1-0.3mg at night  -cognitive(memory losses):  SLP, repeat neuropsych test  -ADLs: OT  -headaches: per Dr. Cortez   -PT cervical ROM  -BIALA and ABIS referral    In the interim, pt continues to have headaches.  She has started SLP and PT.  NP and OT testing is pending.  Pt is not sleeping well.  She tried melatonin in the past and developed 'shakes'.  She has tried trazodone with no effect either positive or adverse.  She [facility] in 2014 and found to have UARS and insurance did not cover CPAP.       Initial HPI(1/4/17)  Per previous records, patient was in the hospital early 2013 after "passed out" and became unresponsive. CPR in the field for 8 minutes until EMS arrived. Per ED note, on arrival she was found to be in PEA, given epinephrine. Vfib/Vtach was achieved which was shocked x1. Patient converted to sinus tachycardia after shock. I do not know the time course for the above treatment. On arrival to facility patient was in sinus tachycardia with strong pulses, intubated and unresponsive. ET tube placement was confirmed with direct laryngoscopy and good bilateral breath sounds were heard after the tube was pulled back 2 cm. Reported to have "withdrawal" movements in ER during stabilization, then sedated and cooling protocol initiated. Had remarkable recovery and first seen in clinic in April 2013.  During this episode patient fell and hit her head.  NO bleed was noted at that time.    She has extensive headaches.  She is on numerous PPX medications and also receives botox and infusion therapy.    Her headaches are associated with neck pain.  She has received trigger point injections which have helped.  She has associated nausea, dizziness.    She endorses poor sleep.  She is currently using tizanadine.  She endorses cognitive issues and is currently donepezil, which helps somewhat.  Her mood is labile, and currently is good.  She denies " SI/HI.  She has episodes that she calls seizures.  She says she is aware of shaking episodes (2-3 since the cardiac arrest).    She denies any changes in vision, previous head/neck trauma, appetite/smell changes.  She is no longer having her cycle.  SHe is not involved in litigation.   She lives with her daughters.   SHe is independent in ADLs, but requires assistance iADLs.  She acknowledges difficulties with house-hold chores, e.g. Cooking, dish washing, etc.            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    PEA arrest with subsequent long-QT    Hypertension     Pacemaker 2013    Seizures     Stroke     weakness lt side       Past Surgical History:   Procedure Laterality Date    breast reduction      BREAST SURGERY      CARDIAC DEFIBRILLATOR PLACEMENT      CARDIAC DEFIBRILLATOR PLACEMENT      CARPAL TUNNEL RELEASE Right     COSMETIC SURGERY      HERNIA REPAIR      HIATAL HERNIA REPAIR      INSERT / REPLACE / REMOVE PACEMAKER      TUBAL LIGATION         Family History   Problem Relation Age of Onset    Hypertension Mother     COPD      Heart failure         Social History     Social History    Marital status: Single     Spouse name: N/A    Number of children: N/A    Years of education: N/A     Occupational History     [employer]     Social History Main Topics    Smoking status: Never Smoker    Smokeless tobacco: Never Used    Alcohol use No    Drug use: No    Sexual activity: No     Other Topics Concern    None     Social History Narrative     2/21/17            Current Outpatient Prescriptions:     aripiprazole (ABILIFY) 5 MG Tab, TAKE 1 TABLET(5 MG) BY MOUTH EVERY DAY, Disp: 90 tablet, Rfl: 5    atorvastatin (LIPITOR) 40 MG tablet, Take 1 tablet (40 mg total) by mouth every morning., Disp: 90 tablet, Rfl: 3     carvedilol (COREG) 25 MG tablet, TAKE 1 TABLET(25 MG) BY MOUTH TWICE DAILY WITH MEALS, Disp: 180 tablet, Rfl: 3    chlorthalidone (HYGROTEN) 25 MG Tab, Take 1 tablet (25 mg total) by mouth once daily., Disp: 90 tablet, Rfl: 11    divalproex (DEPAKOTE) 250 MG EC tablet, Take 250 mg by mouth once daily. , Disp: , Rfl: 11    donepezil (ARICEPT) 10 MG tablet, Take 1 tablet (10 mg total) by mouth 2 (two) times daily., Disp: 180 tablet, Rfl: 3    gabapentin (NEURONTIN) 300 MG capsule, Take 3 capsules (900 mg total) by mouth 3 (three) times daily., Disp: 810 capsule, Rfl: 12    lamotrigine (LAMICTAL) 200 MG tablet, Take 1 tablet (200 mg total) by mouth once daily., Disp: 30 tablet, Rfl: 11    lorazepam (ATIVAN) 0.5 MG tablet, Take 1 tablet (0.5 mg total) by mouth every 6 (six) hours as needed for Anxiety., Disp: 30 tablet, Rfl: 1    magnesium 200 mg Tab, Take 200 mg by mouth once daily., Disp: , Rfl:     ondansetron (ZOFRAN-ODT) 4 MG TbDL, Take 1 tablet (4 mg total) by mouth every 24 hours as needed (nausea)., Disp: 8 tablet, Rfl: 3    pantoprazole (PROTONIX) 40 MG tablet, Take 1 tablet (40 mg total) by mouth once daily., Disp: 30 tablet, Rfl: 11    tizanidine (ZANAFLEX) 4 MG tablet, Take 4 mg by mouth every 6 (six) hours as needed., Disp: , Rfl:     topiramate (TOPAMAX) 100 MG tablet, Take 1.5 tablets (150 mg total) by mouth 2 (two) times daily., Disp: 90 tablet, Rfl: 12    trazodone (DESYREL) 100 MG tablet, Take 1 tablet (100 mg total) by mouth every evening., Disp: 30 tablet, Rfl: 11    VITAMIN D2 50,000 unit capsule, TAKE 1 CAPSULE BY MOUTH EVERY 7 DAYS, Disp: 6 capsule, Rfl: 0    warfarin (COUMADIN) 5 MG tablet, Take 1 tablet (5 mg total) by mouth Daily., Disp: 30 tablet, Rfl: 11    zolpidem (AMBIEN) 10 mg Tab, Take 1 tablet (10 mg total) by mouth nightly as needed., Disp: 30 tablet, Rfl: 5    Current Facility-Administered Medications:     onabotulinumtoxina injection 200 Units, 2 vial, Intramuscular,  Q90 Days, David Cortez MD, 200 Units at 09/15/16 1225    onabotulinumtoxina injection 200 Units, 2 vial, Intramuscular, Q90 Days, David Cortez MD    onabotulinumtoxina injection 200 Units, 2 vial, Intramuscular, Q90 Days, David Cortez MD, 200 Units at 11/30/16 1136    onabotulinumtoxina injection 200 Units, 200 Units, Intramuscular, Q10 weeks, David Cortez MD, 200 Units at 03/09/17 1343    onabotulinumtoxina injection 200 Units, 200 Units, Intramuscular, Q90 Days, David Cortez MD    Review of Systems  Review of Systems   Constitutional: Negative for chills and fever.   Gastrointestinal: Positive for nausea and vomiting.   Neurological: Positive for dizziness, focal weakness, seizures, loss of consciousness and headaches. Negative for tingling, tremors, sensory change and speech change.   Psychiatric/Behavioral: Positive for depression and memory loss. Negative for hallucinations, substance abuse and suicidal ideas. The patient has insomnia. The patient is not nervous/anxious.        1/4/16          Objective:     Vitals:    03/23/17 0822   BP: (!) 144/99   Pulse: 80      Physical Exam      Constitutional: obese AA female  HENT:   Head: Normocephalic and atraumatic.   Neck and spine: Normal range of motion. Neck supple. No TTP in cervical, thoracic, and lumbar spine  Cardiovascular: Normal rate, regular rhythm and normal heart sounds.    Pulmonary/Chest: Effort normal and breath sounds normal.   Abdominal: Soft. Bowel sounds are normal.   Skin: Skin is warm.   Ext: No c/c/e noted    The patient is awake, attentive, Alert, oriented to person, place and time.  Language is intact to comprehension, repetition, and production  Able to follow multi-step commands  No findings to suggest executive dysfuntion    Patient has adequate insight    Mood is stable      Pursuits were smooth, normal saccades, no nystagmus bilaterally  PERRL, VFFC, EOMI, sensation is diminished to LT L    Motor - facial movement was  symmetrical and normal, no facial droop seen.   hearing grossly intact  Palate moved well and was symmetrical with normal palatal and oral sensation  Tongue protrudes midline and movements were full      Normal tone.  No spasticity, cogwheel rigidity  Normal bulk. No pronator drift                        Right      Left  Deltoid            5          5  Biceps            5          5  Triceps           5          5  BR                  5          5                   5          5    Hip Flex            5          5  Hip Ext             5          5  Leg ABduc       5          5  Leg ADduc       5          5  Knee Flex         5          5  Knee Ext          5          5  Plantar Flexion  5          5  Dorsiflexion       5          5      No tremor noted    Reflexes normal and symmteric in bl upper and lower extremities, including biceps, triceps, and patellar    Sensory:  Light touch: diminished on L  Pinprick sensation:  diminished on L    Coordination:  F-to-N:  intact    H-to-S:  intact   Rapid-alt movements:  Normal amplitude and frequency     Romberg Sign:  negative  General gait:   Normal arm swing and turns. Normal postural reflexes.   Tandem gait:  Intact    Visuospatial/Executive  Alternating Trail Makin - correct  Cube: 0 - incorrect  Clock Contour: 1 - correct  Clock Numbers: 1 - correct  Clock Hands: 1 - correct  Naming  Lion: 1 - correct  Rhino: 1 - correct  Camel: 1 - correct  Memory - First Trial  Face: Yes  Velvet: Yes  Congregation: Yes  Laurie: Yes  Red: Yes  Memory - Second Trial  Face: Yes  Velvet: Yes  Congregation: Yes  Laurie: Yes  Red: Yes  Attention  Forward Order - 2 1 8 5 4: 1 - correct  Attention - Backward Order: 1 - correct  Letters: 1 - correct  Numbers: 2 - 2 or 3 correct  Language  Repeat - Aric: 1 - correct  Repeat - Cat: 1 - correct  Fluency : 1 - correct  Abstraction  Train - Bicycle: 1 - correct  Watch-Ruler: 1 - correct  Delayed Recall  Face: 0 - incorrect  Velvet: 0 -  incorrect  Mandaeism: 0 - incorrect  Laurie: 0 - incorrect  Red: 0 - incorrect  Orientation  Date: 1 - correct  Month: 1 - correct  Year: 1 - correct  Day: 1 - correct  Place: 1 - correct  City: 1 - correct  Other  Add 1 point if less than or equal to 12 yr edu: 0 - incorrect  Total Score: 23 (1/4/16)                          TSH   Date/Time Value Ref Range Status   03/13/2017 10:05 AM 1.407 0.400 - 4.000 uIU/mL Final   05/29/2015 12:30 PM 1.819 0.400 - 4.000 uIU/mL Final   05/07/2014 12:18 PM 1.368 0.400 - 4.000 uIU/mL Final   02/02/2014 09:26 PM 1.112 0.400 - 4.000 uIU/mL Final   02/07/2013 06:04 PM 13.210 (H) 0.4 - 4.0 uIU/ml Final   Results for PUSHPA KEY (MRN [id number]) as of 1/4/2017 09:19   Ref. Range 7/20/2015 11:06 8/19/2015 14:15   Vitamin B2 Latest Ref Range: 1 - 19 mcg/L  2   Vitamin B6 Latest Ref Range: 5 - 50 ug/L  4 (L)   Vit D, 25-Hydroxy Latest Ref Range: 30 - 96 ng/mL 13 (L)      Neuro-imaging personally reviewed    Results for orders placed or performed during the hospital encounter of 03/14/16   CT Head Without Contrast    Narrative    Procedure comment: CT examination of the head was performed from the skull base through the vertex without the use of intravenous contrast using 5-mm axial images.    Comparison: CT head 03/24/2015    Findings: There is no evidence of acute or recent major vascular territory cerebral infarction, parenchymal hemorrhage, or intra-axial mass.  There are no extra-axial masses or abnormal fluid collections.  The ventricular system is within normal limits of size for age and shows no distortion by mass-effect or evidence of hydrocephalus.  There is no fracture or destructive osseous lesion.  The extracranial soft tissues are unremarkable.  The visualized paranasal sinuses and mastoid air cells are clear.    Impression     No acute intracranial abnormality.  ______________________________________     Electronically signed by resident: KRISSY MAYERS MD  Date:      03/14/16  Time:    17:09     ______________________________________     Electronically signed by: RAYMOND MATHEWS MD  Date:     03/14/16  Time:    17:31    Results for PUSHPA KEY (MRN [id number]) as of 1/4/2017 09:19   Ref. Range 7/20/2015 11:06   Vitamin B-12 Latest Ref Range: 210 - 950 pg/mL 383     SUMMARY AND RECOMMENDATIONS from neuropsych testing:   Ms. Key was referred for Neuropsychological Evaluation on an outpatient basis due to subjective residual memory impairment following acute cardiac arrest complicated by a stroke and closed head injury (hypoxic brain injury), all of which occurred on 2/7/2013. Her general cognitive abilities as assessed by the RBANS were in the severely impaired range, with moderately impaired language (due to reduced verbal fluency); and severely impaired immediate verbal memory, visuospatial/constructional abilities, attention, and delayed memory. Further assessment of specific cognitive abilities revealed deficits in temporal orientation, naming, verbal fluency, and facial recognition. Constructional ability was unimpaired. Additional memory assessment revealed severely impaired immediate and delayed memory. Personality test data suggested the presence of severe depression and moderate anxiety. Neuropsychological testing reveals moderate to severe impairment in memory, attention, temporal orientation, visuospatial/constructional abilities, facial recognition, and verbal fluency due to hypoxic brain injury and stroke. Naming was variable with performances in the average and moderately impaired range. She will follow up with Juan Carlos Childers DNP in Psychiatry for depression and anxiety.      [facility](3/2017) Neuro-imaging personally reviewed  No acute intracranial abnormality identified.     INR 1.8(3/20/17)  Assessment:        1. Chronic migraine without aura, with intractable migraine, so stated, with status migrainosus     2. Supraorbital neuralgia     3. Vitamin D deficiency       4. Anoxic brain injury     5. Cognitive deficits     6. 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 Transient alteration of awareness     8. Cervicalgia     9. Other vitamin B12 deficiency anemia          52 yo AAF with HIE w/ CVA (2013), Asthma, CAD, depression, HTN, PM, seizures, CVA who presents for concussion evaluation..  History is notable for PEA s/p cooling and suspected HIE and CVA in 2013 and subsequent chronic and intractable headaches.  Interval hx is notable for another mTBI.  Exam is notable for obese AAF with normal cervical ROM, CTAB, and RRR.  Neuro exam is notable for pleasant female, with diminished facial sensation on L, o/w intact CN including VFFC, normal tone and, no pronator drift, decreased sensation LUE and LLE, normal gait, negative Romberg, negative cortical signs, and MOCA 23/30 (immediate recall, visuospatial)  Workup is notable unremarkable INR 1.8(3/20/17), CTH, low Vit D, low normal B12, low B6.  Neuropsych eval revealed anxiety/depression and cognitive impairments in memory, attention, temporal orientation, visuospatial/constructional abilities, facial recognition, and verbal fluency.  Pt's presentation is c/w HIE.   It is unlikely her concussion is contributing to her headaches, but other events like CVA and HIE also contributing.        Plan:       -conservative measures: cognitive rest, avoid further TBIs, abstain from EtOH  -sleep/wake cycle:   -sleep hygiene   -trazodone 200mg QHS PRN, educated about serotonin syndrome   -sleep clinic referral U[facility] and CPAP  -cognitive(memory losses):  SLP,   -ADLs: OT  -headaches: per Dr. Dana epstein completed   -PT cervical ROM  -recommend patient move to assisted living with support of daughter, letter provided  -[facility] and [facility] referral        Missy Rock MD  Neurologist  [facility]     Focused examination was undertaken today.  I spent 25 minutes with the patient.  >50% of  that time was spent on counseling regarding her symptoms, review of diagnostics, and building and coordinating a treatment plan based on the combination of results and symptoms.   Questions were sought and answered to her stated verbal satisfaction.    Greater Occipital Nerve Block Injection Procedure  (3/22/17)  Pre-operative diagnosis: Cervigogenic headache   Post-operative diagnosis: Same   Procedure: Chemical neurolysis     Procedure note:   GONB (Greater Occipital Nerve Block): After risks and benefits were explained including bleeding, infection, worsening of pain, damage to the areas being injected, weakness of muscles, or loss of muscle control.  After informed consent was obtained (a copy was given to office staff to scan into the EMR), the patient was asked to remain in a sitting position with their head resting prone ontheir chest. The area was prepped using alcohol swabs. 2% lidocaine (2 mL x2 sides of neck=4 mL total) and 40 mg (1 bottle per sitex2 for total of 80 mg)depomedrol was drawn up utilizing a 21 gauge needle. The occipital trigger points were palpated utilizing latex gloves, a 27 gauge needle was utilized and aspiration to ensure no blood was used during the technique. The medication was delivered bilateral (all of the above was duplicated for the opposite side) in sites 1) midway between the inion and mastoid along the occipital ridge, 2) 2 finger breaths superior lateral to the first injection and 3) 2 finger breaths superior medial to the first injection. The patient tolerated the procedure well with no active bleeding, erythema, or discharge.  The patient was assessed and allowed to leave after ten minutes.  Findings from repeat exam (10 mins after procedure) showed:    Gen: NAD  Derm: no drainage  Neuro: AOx3, EOMI, tongue in midline, FROM of all extremities, OOC/gait WNL         Missy Rock MD  Neurologist  [facility]

## 2017-03-24 ENCOUNTER — ANTI-COAG VISIT (OUTPATIENT)
Dept: CARDIOLOGY | Facility: CLINIC | Age: 52
End: 2017-03-24
Payer: MEDICARE

## 2017-03-24 DIAGNOSIS — Z79.01 LONG TERM (CURRENT) USE OF ANTICOAGULANTS: Primary | ICD-10-CM

## 2017-03-24 DIAGNOSIS — I48.0 PAROXYSMAL ATRIAL FIBRILLATION: ICD-10-CM

## 2017-03-24 LAB — INR PPP: 1.9 (ref 2–3)

## 2017-03-24 PROCEDURE — 85610 PROTHROMBIN TIME: CPT | Mod: PBBFAC | Performed by: PHARMACIST

## 2017-03-24 NOTE — PROGRESS NOTES
Patient denies any changes in diet, medications, or health that would effect warfarin therapy.  INR improving but not quite in range. i will increase dose slightly at this time.

## 2017-03-29 ENCOUNTER — ANTI-COAG VISIT (OUTPATIENT)
Dept: CARDIOLOGY | Facility: CLINIC | Age: 52
End: 2017-03-29
Payer: MEDICARE

## 2017-03-29 ENCOUNTER — CLINICAL SUPPORT (OUTPATIENT)
Dept: CARDIOLOGY | Facility: CLINIC | Age: 52
End: 2017-03-29
Payer: MEDICARE

## 2017-03-29 DIAGNOSIS — R07.9 CHEST PAIN, UNSPECIFIED TYPE: ICD-10-CM

## 2017-03-29 DIAGNOSIS — Z79.01 LONG TERM (CURRENT) USE OF ANTICOAGULANTS: Primary | ICD-10-CM

## 2017-03-29 DIAGNOSIS — E78.5 HYPERLIPIDEMIA, UNSPECIFIED HYPERLIPIDEMIA TYPE: ICD-10-CM

## 2017-03-29 DIAGNOSIS — I48.0 PAROXYSMAL ATRIAL FIBRILLATION: ICD-10-CM

## 2017-03-29 LAB — INR PPP: 1.7 (ref 2–3)

## 2017-03-29 PROCEDURE — 99999 PR PBB SHADOW E&M-EST. PATIENT-LVL I: CPT | Mod: PBBFAC,,, | Performed by: PHARMACIST

## 2017-03-29 PROCEDURE — 93016 CV STRESS TEST SUPVJ ONLY: CPT | Mod: S$PBB,,, | Performed by: INTERNAL MEDICINE

## 2017-03-29 PROCEDURE — 78492 MYOCRD IMG PET MLT RST&STRS: CPT | Mod: 26,S$PBB,, | Performed by: INTERNAL MEDICINE

## 2017-03-29 PROCEDURE — 93017 CV STRESS TEST TRACING ONLY: CPT | Mod: PBBFAC | Performed by: INTERNAL MEDICINE

## 2017-03-29 PROCEDURE — 93018 CV STRESS TEST I&R ONLY: CPT | Mod: S$PBB,,, | Performed by: INTERNAL MEDICINE

## 2017-03-29 NOTE — PROGRESS NOTES
Patient denies any changes in diet, medications, or health that would effect warfarin therapy.  INR hovering low so I will increase dose more.

## 2017-04-03 ENCOUNTER — PATIENT MESSAGE (OUTPATIENT)
Dept: NEUROLOGY | Facility: CLINIC | Age: 52
End: 2017-04-03

## 2017-04-04 ENCOUNTER — PATIENT MESSAGE (OUTPATIENT)
Dept: NEUROLOGY | Facility: CLINIC | Age: 52
End: 2017-04-04

## 2017-04-04 ENCOUNTER — TELEPHONE (OUTPATIENT)
Dept: CARDIOLOGY | Facility: CLINIC | Age: 52
End: 2017-04-04

## 2017-04-04 NOTE — TELEPHONE ENCOUNTER
PATIENT STATES SHE CAN NOT TAKE ASA, SHE IS ALLERGIC TO THEM. ALSO QUESTION REHAB THAT YOU WANTED TO ORDER AND LABS. PLEASE INFORM.

## 2017-04-04 NOTE — TELEPHONE ENCOUNTER
----- Message from Richmond Toney MD sent at 4/4/2017  8:06 AM CDT -----  Contact: patient    Hello,  Can you please notify Ms Chawla of the results of her stress test. It shows that there are no blockages that need to be addressed. She does have some mild plaque build up in the coronary arteries, but this is not the reason for her chest pain. She should stay on the atorvastatin and start a baby aspirin every day. She should continue to see her PCP and neurologist.    Thank you,  Jason Toney    ----- Message -----     From: Tonia Morales LPN     Sent: 4/3/2017  11:48 AM       To: Richmond Toney MD    Please inform of any new order.   ----- Message -----     From: Sosa Edwards     Sent: 4/3/2017  11:33 AM       To: Feng Fragoso Staff    Please call pt at [phone]. Results needed for Pet Stress done on 03/29/17    Thank you

## 2017-04-05 ENCOUNTER — CLINICAL SUPPORT (OUTPATIENT)
Dept: REHABILITATION | Facility: HOSPITAL | Age: 52
End: 2017-04-05
Attending: PSYCHIATRY & NEUROLOGY
Payer: MEDICARE

## 2017-04-05 ENCOUNTER — ANTI-COAG VISIT (OUTPATIENT)
Dept: CARDIOLOGY | Facility: CLINIC | Age: 52
End: 2017-04-05
Payer: MEDICARE

## 2017-04-05 DIAGNOSIS — F06.8 COGNITIVE DEFICIT AS LATE EFFECT OF TRAUMATIC BRAIN INJURY: ICD-10-CM

## 2017-04-05 DIAGNOSIS — I48.0 PAROXYSMAL ATRIAL FIBRILLATION: ICD-10-CM

## 2017-04-05 DIAGNOSIS — Z79.01 LONG TERM (CURRENT) USE OF ANTICOAGULANTS: Primary | ICD-10-CM

## 2017-04-05 DIAGNOSIS — S06.9X0S COGNITIVE DEFICIT AS LATE EFFECT OF TRAUMATIC BRAIN INJURY: ICD-10-CM

## 2017-04-05 LAB — INR PPP: 1.9 (ref 2–3)

## 2017-04-05 PROCEDURE — 97532 *HC SP COG SKL DEV EA 15 MIN: CPT | Mod: PO

## 2017-04-05 PROCEDURE — 99999 PR PBB SHADOW E&M-EST. PATIENT-LVL I: CPT | Mod: PBBFAC,,, | Performed by: PHARMACIST

## 2017-04-05 NOTE — PROGRESS NOTES
OUTPATIENT NEUROLOGICAL REHABILITATION  SPEECH THERAPY PROGRESS NOTE    Date:  04/05/2017    Start Time:  1045  Stop Time:  1130    Subjective/History  Onset Date:  February 7, 2013  Primary Diagnosis:  Anoxic Brain Injury  Treatment Diagnosis:  Cognitive-Linguistic Impairment  Referring Provider:  Dr. Rock  Orders:  for evaluation and treat  Current Medical History:  Mrs. Chawla presents to the [facility] secondary to the diagnosis of anoxic brain injury. She suffered a cardiac arrest, CVA, and fell and hit her head and suffered a concussion in 2013. She had some complications while hospitalized during that time. She now has a pacemaker and defibrillator and suffers with depression and headaches.  No family was present during this evaluation to provide history. She took the [facility] LIFT today. She attended  from 1/16/17 to 2/16/17. She reports that she had a TIA right after [place] and then subsequently hit her head on the window sill. Since the head injury, she reports getting nerve block shots. She struggles with memory and problem solving.   Past Medical History:   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    PEA arrest with subsequent long-QT    Hypertension     Pacemaker 2013    Seizures     Stroke     weakness lt side     Precautions:  memory        TIMED  Procedure Time Min.   CogntiveTherapy Start: 1045  Stop: 1130  45    Start:  Stop:     Start:  Stop:     Start:  Stop:          UNTIMED  Procedure Time Min.    Start:    Stop:       Start:  Stop:      Total Minutes: 45  Total Timed Units: 3  Total Untimed Units: 0  Charges Billed/# of units: 3    Visit #: 4  Date of Evaluation: 1/6/17  Plan of Care Expiration:  3/17/17  Extended POC:   G-CODE  4/10    karmen  CJ/CI   update                 Progress/Current Status    Subjective:     Pain: 9 /10  Pt reports that she has had a horrible headache off and on for a few days. She reports that she reported it to her MD and he recommended she take tylenol.     Objective:   Scales of Cognitive and Communicative Ability for Neurorehabilitation (SCCAN) was re-administered to assess cognitive and communicative functioning s/p TIA and 2nd head injury.         Raw Scores  Percentage Score  Oral Expression (OE)  18/19   94%     Orientation (OR)   12/12   100%   Memory (ME)    13/19   68  Speech Comprehension (SP)  12/13   92%  Reading Comprehension (RD)  10/12   83%  Writing (WR)    7/7   100%  Attention (AT)    15/16   93%  Problem Solving (PS)   20/23   86%    Total Raw Score 84  %ile Rank 12% SCCAN Index  78 Degree of Severity mild impairment      Short Term Goals (6 weeks):   1. Mrs. Chawla will complete short term memory tasks with 90% accuracy using compensatory strategies to increase memory.  2. Mrs. Chawla will complete moderate to complex problem solving, reasoning, mental manipulation, sequencing and organization tasks with 90% accuracy to increase cognition.   3. Mrs. Chawla will list 15 to 20 items in categories within one minute to improve word fluency and thought organization.       Long Term Goals (8 weeks):   1. Mrs. Chawla will increase her cognitive-linguistic skills using compensatory strategies to improve functional independence.   2.1. Mrs. Chawla will demonstrate understanding and use of compensatory strategies to improve functional independence.     Assessment:     Mrs. Chawla exhibited a cognitive-linguistic impairment due to late effects from a stroke and anoxic brain injury, recent TIA, and second brain injury. Her deficits were characterized by decreased short term memory, decreased attention/concentration, decreased moderate to complex problem-solving, reasoning, sequencing and organization. Auditory comprehension, verbal  expression, voice, and fluency were within functional limits. She will benefit from outpatient neurological rehabilitation speech therapy. A repeat neuropsychological evaluation may be beneficial since the last one was in 2013.     Patient Education/Response:     Compensatory strategies for short term memory were discussed. Pt verbalized understanding.     Plans and Goals:     Certification Period: 01/04/17 to 12/31/17  Plan of Care Certification Period: 01/16/17 to 05/18/17    Continue Updated POC.    Recommended Treatment Plan:  Mrs. Chawla will   participate in the [facility] rehabilitation program for speech therapy 2 times per week to address her deficits.    Other Recommendations: Neuropsychological evaluation    ELIZABETH Saunders, CCC-SLP   4/5/17

## 2017-04-05 NOTE — PROGRESS NOTES
INR moving in the right direction but not quite in range. I will increase her dose slightly and keep close watch

## 2017-04-06 NOTE — PLAN OF CARE
OUTPATIENT NEUROLOGICAL REHABILITATION  SPEECH THERAPY PROGRESS NOTE / Updated POC    Date:  04/05/2017    Start Time:  1045  Stop Time:  1130    Subjective/History  Onset Date:  February 7, 2013  Primary Diagnosis:  Anoxic Brain Injury  Treatment Diagnosis:  Cognitive-Linguistic Impairment  Referring Provider:  Dr. Rock  Orders:  for evaluation and treat  Current Medical History:  Mrs. Chawla presents to the [facility] secondary to the diagnosis of anoxic brain injury. She suffered a cardiac arrest, CVA, and fell and hit her head and suffered a concussion in 2013. She had some complications while hospitalized during that time. She now has a pacemaker and defibrillator and suffers with depression and headaches.  No family was present during this evaluation to provide history. She took the RTA LIFT today. She attended  from 1/16/17 to 2/16/17. She reports that she had a TIA right after [place] and then subsequently hit her head on the window sill. Since the head injury, she reports getting nerve block shots. She struggles with memory and problem solving.   Past Medical History:   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    PEA arrest with subsequent long-QT    Hypertension     Pacemaker 2013    Seizures     Stroke     weakness lt side     Precautions:  memory        TIMED  Procedure Time Min.   CogntiveTherapy Start: 1045  Stop: 1130  45    Start:  Stop:     Start:  Stop:     Start:  Stop:          UNTIMED  Procedure Time Min.    Start:    Stop:       Start:  Stop:      Total Minutes: 45  Total Timed Units: 3  Total Untimed Units: 0  Charges Billed/# of units: 3    Visit #: 4  Date of Evaluation: 1/6/17  Plan of Care Expiration:  3/17/17  Extended POC:   G-CODE  4/10    eval  CJ/CI   update                 Progress/Current Status    Subjective:     Pain: 9 /10  Pt reports that she has had a horrible headache off and on for a few days. She reports that she reported it to her MD and he recommended she take tylenol.     Objective:   Scales of Cognitive and Communicative Ability for Neurorehabilitation (SCCAN) was re-administered to assess cognitive and communicative functioning s/p TIA and 2nd head injury.         Raw Scores  Percentage Score  Oral Expression (OE)  18/19   94%     Orientation (OR)   12/12   100%   Memory (ME)    13/19   68  Speech Comprehension (SP)  12/13   92%  Reading Comprehension (RD)  10/12   83%  Writing (WR)    7/7   100%  Attention (AT)    15/16   93%  Problem Solving (PS)   20/23   86%    Total Raw Score 84  %ile Rank 12% SCCAN Index  78 Degree of Severity mild impairment      Short Term Goals (6 weeks):   1. Mrs. Chawla will complete short term memory tasks with 90% accuracy using compensatory strategies to increase memory.  2. Mrs. Chawla will complete moderate to complex problem solving, reasoning, mental manipulation, sequencing and organization tasks with 90% accuracy to increase cognition.   3. Mrs. Chawla will list 15 to 20 items in categories within one minute to improve word fluency and thought organization.       Long Term Goals (8 weeks):   1. Mrs. Chalwa will increase her cognitive-linguistic skills using compensatory strategies to improve functional independence.   2.1. Mrs. Chawla will demonstrate understanding and use of compensatory strategies to improve functional independence.     Assessment:     Mrs. Chawla exhibited a cognitive-linguistic impairment due to late effects from a stroke and anoxic brain injury, recent TIA, and second brain injury. Her deficits were characterized by decreased short term memory, decreased attention/concentration, decreased moderate to complex problem-solving, reasoning, sequencing and organization. Auditory comprehension, verbal  expression, voice, and fluency were within functional limits. She will benefit from outpatient neurological rehabilitation speech therapy. A repeat neuropsychological evaluation may be beneficial since the last one was in 2013.     Patient Education/Response:     Compensatory strategies for short term memory were discussed. Pt verbalized understanding.     Plans and Goals:     Certification Period: 01/04/17 to 12/31/17  Plan of Care Certification Period: 01/16/17 to 05/18/17    Continue Updated POC.    Recommended Treatment Plan:  Mrs. Chawla will   participate in the [facility] rehabilitation program for speech therapy 2 times per week to address her deficits.    Other Recommendations: Neuropsychological evaluation    ELIZABETH Saunders, CCC-SLP   4/5/17

## 2017-04-12 ENCOUNTER — CLINICAL SUPPORT (OUTPATIENT)
Dept: REHABILITATION | Facility: HOSPITAL | Age: 52
End: 2017-04-12
Attending: PSYCHIATRY & NEUROLOGY
Payer: MEDICARE

## 2017-04-12 DIAGNOSIS — G44.309 HEADACHES DUE TO OLD HEAD INJURY: ICD-10-CM

## 2017-04-12 DIAGNOSIS — M25.612 DECREASED ROM OF LEFT SHOULDER: ICD-10-CM

## 2017-04-12 DIAGNOSIS — M25.512 BILATERAL SHOULDER PAIN, UNSPECIFIED CHRONICITY: ICD-10-CM

## 2017-04-12 DIAGNOSIS — S09.90XS HEADACHES DUE TO OLD HEAD INJURY: ICD-10-CM

## 2017-04-12 DIAGNOSIS — F06.8 COGNITIVE DEFICIT AS LATE EFFECT OF TRAUMATIC BRAIN INJURY: ICD-10-CM

## 2017-04-12 DIAGNOSIS — R29.898 DECREASED ROM OF NECK: Primary | ICD-10-CM

## 2017-04-12 DIAGNOSIS — Z74.09 IMPAIRED FUNCTIONAL MOBILITY, BALANCE, GAIT, AND ENDURANCE: ICD-10-CM

## 2017-04-12 DIAGNOSIS — M25.511 BILATERAL SHOULDER PAIN, UNSPECIFIED CHRONICITY: ICD-10-CM

## 2017-04-12 DIAGNOSIS — M54.2 CERVICALGIA: ICD-10-CM

## 2017-04-12 DIAGNOSIS — S06.9X0S COGNITIVE DEFICIT AS LATE EFFECT OF TRAUMATIC BRAIN INJURY: ICD-10-CM

## 2017-04-12 PROCEDURE — 97116 GAIT TRAINING THERAPY: CPT | Mod: PO | Performed by: PHYSICAL THERAPIST

## 2017-04-12 PROCEDURE — G8990 OTHER PT/OT CURRENT STATUS: HCPCS | Mod: CK,PO | Performed by: PHYSICAL THERAPIST

## 2017-04-12 PROCEDURE — 97532 *HC SP COG SKL DEV EA 15 MIN: CPT | Mod: PO

## 2017-04-12 PROCEDURE — G8991 OTHER PT/OT GOAL STATUS: HCPCS | Mod: CJ,PO | Performed by: PHYSICAL THERAPIST

## 2017-04-12 PROCEDURE — 97164 PT RE-EVAL EST PLAN CARE: CPT | Mod: PO | Performed by: PHYSICAL THERAPIST

## 2017-04-12 NOTE — PROGRESS NOTES
"   PHYSICAL THERAPY RE-EVALUATION                                                      Physical Therapy Progress Note     Name: Amna Chawla  [facility] Number: [id number]  Diagnosis:   M54.81 (ICD-10-CM) - Bilateral occipital neuralgia   G93.1 (ICD-10-CM) - Anoxic brain injury   R51 (ICD-10-CM) - Cervicogenic headache     Physician: Missy Rock MD  Treatment Orders: PT Eval and Treat  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    PEA arrest with subsequent long-QT    Hypertension     Pacemaker 2013    Seizures     Stroke     weakness lt side       Precautions: standard  Visit #: 6  Date of Eval: 1/18/17  Plan of Care Expiration: 4/12/17  Extended POC: 7/5/17    Functional Limitations Reports - G Codes  Category: other   Tool: cervical rotation ROM, FGA  Score: right= 46 degrees, left= 32 degrees, 16/30    G codes 6/10    G code Mobility   eval CK-CJ   4/12/17 CK-CJ             Subjective   Pt reports: "I had a light stroke". Pt reports high heart rate- per MD this was a sign of the stroke 4-6 weeks ago. Pt is now on Coumadin to address blood clots.  Pain Scale:  TMJ- 10/10. Left shoulder pain is inconsistent, today TMJ is overpowering shoulder pain.    Objective   Blood pressure 143/100, HR 58    ROM:   UPPER EXTREMITY--AROM/PROM  (R) UE: limited as follows: shoulder AROM in supine: flex= 126, abd= 120 degrees  (L) UE: flex= 123 degrees, abd= 93 degrees      Cervical ROM:   Flex= 30 degrees  Ext= 20 degrees  Rotation: right= 46 degrees, left= 32 degrees  SB: right= 27 degrees, left= 23 degrees    Upper Extremity Strength    RUE LUE   Shoulder Flexion: 4+/5 4/5   Shoulder Abduction: 4+/5 4/5   Shoulder Extension: 4+/5 4/5   Shoulder External  Rotation: 4+/5 3+/5   Shoulder Internal  Rotation: 4+/5 4/5   Elbow Flexion: 5/5 5/5   Elbow Extension: 5/5 5/5   lats " 4-/5 3-/5   romboids 3+/5 3/5   Mid traps 3+/5 3/5   Low traps 4/5 2+/5     * Serratus weakness noted    Lower Extremity Strength    RLE LLE   Hip Flexion: 4+/5 4/5   Hip Extension:  4/5 3+/5   Hip Abduction: 4/5 3-/5   Hip Adduction: NT NT   Knee Extension: 5/5 5/5   Knee Flexion: 5/5 5/5   Ankle Dorsiflexion: NT NT   Ankle Plantarflexion: NT NT   Ankle Inversion: NT NT   Ankle Eversion: NT NT          FSWS= 6m in 5.59 sec= 1.07 m/s  SSWS= 6m in 6.79 sec= 0.88 m/s    Functional Gait Assessment:   1. Gait on level surface =  2, 6.79   (3) Normal: less than 5.5 sec, no A.D., no imbalance, normal gait pattern, deviates< 6in   (2) Mild impairment: 7-5.6 sec, uses A.D., mild gait deviations, or deviates 6-10 in   (1) Moderate impairment: > 7 sec, slow speed, imbalance, deviates 10-15 in.   (0) Severe impairment: needs assist, deviates >15 in, reach/touch wall  2. Change in Gait Speed = 2   (3) Normal: smooth change w/o loss of balance or gait deviation, deviates < 6 in, significant difference between speeds   (2) Mild impairment: changes speed, but demonstrates mild gait deviations, deviates 6-10 in, OR no deviations but unable to significantly speed, OR uses A.D.   (1) Moderate impairment: minor changes to speed, OR changes speed w/ significant deviations, deviates 10-15 in, OR  Changes speed , but loses balance & recovers   (0) Severe impairment: cannot change speed, deviates >15 in, or loses balance & needs assist  3. Gait with horizontal head turns  = 1   (3) Normal: no change in gait, deviates <6 in   (2) Mild impairment: slight change in speed, deviates 6-10 in, OR uses A.D.   (1) Moderate impairment: moderate change in speed, deviates 10-15 in   (0) Severe impairment: severe disruption of gait, deviates >15in  4. Gait with vertical head turns = 1   (3) Normal: no change in gait, deviates <6 in   (2) Mild impairment: slight change in speed, deviates 6-10 in OR uses A.D.   (1) Moderate impairment: moderate change  in speed, deviates 10-15 in   (0) Severe impairment: severe disruption of gait, deviates >15 in  5. Gait with pivot turns = 2   (3) Normal: performs safely in 3 sec, no LOB   (2) Mild impairment: performs in >3 sec & no LOB, OR turns safely & requires several steps to regain LOB   (1) Moderate impairment: turns slow, OR requires several small steps for balance following turn & stop   (0) Severe impairment: cannot turn safely, needs assist  6. Step over obstacle = 1   (3) Normal: steps over 2 stacked boxes w/o change in speed or LOB   (2) Mild impairment: able to step over 1 box w/o change in speed or LOB   (1) Moderate impairment: steps over 1 box but must slow down, may require VC   (0) Severe impairment: cannot perform w/o assist  7. Gait with Narrow GAGE = 3   (3) Normal: 10 steps no staggering   (2) Mild impairment: 7-9 steps   (1) Moderate impairment: 4-7 steps   (0) Severe impairment: < 4 steps or cannot perform w/o assist  8. Gait with eyes closed = 1   (3) Normal: < 7 sec, no A.D., no LOB, normal gait pattern, deviates <6 in   (2) Mild impairment: 7.1-9 sec, mild gait deviations, deviates 6-10 in   (1) Moderate impairment: > 9 sec, abnormal pattern, LOB, deviates 10-15 in   (0) Severe impairment: cannot perform w/o assist, LOB, deviates >15in  9. Ambulating Backwards = 1   (3) Normal: no A.D., no LOB, normal gait pattern, deviates <6in   (2) Mild impairment: uses A.D., slower speed, mild gait deviations, deviates 6-10 in   (1) Moderate impairment: slow speed, abnormal gait pattern, LOB, deviates 10-15 in   (0) Severe impairment: severe gait deviations or LOB, deviates >15in  10. Steps = 2   (3) Normal: alternating feet, no rail   (2) Mild Impairment: alternating feet, uses rail   (1) Moderate impairment: step-to, uses rail   (0) Severe impairment: cannot perform safely  Score 16/30= fall risk    Written Home Exercises: upper trap stretch, levator scap stretch, chin tucks, pencil push ups  Pt demo good  "understanding of the education provided. Amna demonstrated good return demonstration of activities.     Education provided re: POC, HEP  No spiritual or educational barriers to learning provided    Pt has no cultural, educational or language barriers to learning provided.    Pt participated in the following treatment today:   Re-evaluation x 30 min  Gait x 15 minutes (FGA)    Assessment   51 yr old female with diagnosis of anoxic BI and CHI was re-evaluated by PT after not attending sessions for a few months. Over this time, pt reports a new onset of CVA and "hitting head resulting in a severe headache. Pt also reports she is now on Coumadin. Pt presents with decreased cervical and left shoulder AROM. Pt also demonstrates decreased strength of left extremities. Per formal assessment, pt is at risk for falls. Goals revised as needed to address current functional limitations. Pt's progress may be affected by history of chronic headaches since anoxic BI. Pt can benefit from skilled PT to address impairments to decrease pain, decrease fall risk, and improve functional independence. Pt was instructed to continue previous stretches that were provided for cervical ROM. Pt presents with an 53% average impairment for mobility, pt is anticipated to improve impairment to 28%. Pt did not complete subjective assessments, will complete with pt next visit.     Pt rehab potential is Good. Pt will benefit from continuing skilled outpatient physical therapy to address the deficits listed below in the problem list, provide pt/family education and to maximize pt's level of independence in the home and community environment.         Medical necessity is demonstrated by the following IMPAIRMENTS/PROMBLEM LIST:   1. Fall Risk - impaired balance   2. Weakness   3. Impaired muscle tone  4. Decreased ROM  5. Gait deviations   6. Impaired ambulation   7. Decreased activity tolerance   8. Difficulty participating in daily activities "   9. Continued inability to participate in vocational pursuits   10. Requires skilled supervision to complete and progress HEP      Anticipated barriers to physical therapy: history of CHI and CVA since initial evaluation.      Pt's spiritual, cultural and educational needs considered and pt agreeable to plan of care and goals as stated below:      GOALS:   Goals appropriate as of 4/12/17:   Short term goals: 6 weeks, pt agrees to goals set.  1. Pt will perform HEP for UE/ scapular strengthening and cervical ROM/ stability with supervision to improve carryover of progress and decrease pain.  2. Pt will demonstrate improved cervical flex/ ext ROM by 10 degrees to demonstrate improved ROM to improve posture.  3. Pt will demonstrate improved cervical rotation to 50 degrees to decrease pain.   4. Pt will demonstrate improved bilateral shoulder AROM for flexion to 140 degrees to be WFLs.  5. Pt will report decreased left shoulder pain to 5/10 with AROM to improve mobility and demonstrate decreased pain.  6. Pt will demonstrate improved balance by scoring 19/30 on Functional Gait Assessment.     Long term goals: 12 weeks, pt agrees to goals set  8. Pt will report decreased HA to 3x/ week or less with no ER visits due to pain x 4 weeks.  9. Pt will demonstrate improved cervical flex/ ext AROM to 50 degrees to demonstrate functional ROM and to decrease pain.  10. Pt will demonstrate improved cervical rotation to 65 degrees to demonstrate improved mobility and decrease HA.  11. Pt will demonstrate bilateral shoulder AROM for abd to 130 degrees to be WFLs.   12. Pt will score 23/30 on Functional Gait Assessment to demonstrate improved balance and decreased fall risk.   13. Pt will demonstrate gait velocity WFLs for diagnosis by ambulating 1.24 m/s.  14. Pt will demonstrate improved balance and decreased fall risk by scoring 22/30 on FGA.             Plan   Continue PT 2-3 x 12 weeks under established Plan of Care, with  treatment to include: pt education, HEP, therapeutic exercises, neuromuscular re-education/balance exercises, therapeutic activities, joint mobilizations, and modalities PRN, to work towards established goals. Pt may be seen by PTA to carry out plan of care.     Mariel Miguel, PT   04/12/2017

## 2017-04-12 NOTE — PROGRESS NOTES
OUTPATIENT NEUROLOGICAL REHABILITATION  SPEECH THERAPY PROGRESS NOTE    Date:  04/12/2017    Start Time:  950  Stop Time:  1030    Subjective/History  Onset Date:  February 7, 2013  Primary Diagnosis:  Anoxic Brain Injury  Treatment Diagnosis:  Cognitive-Linguistic Impairment  Referring Provider:  Dr. Rock  Orders:  for evaluation and treat  Current Medical History:  Mrs. Chawla presents to the [facility] secondary to the diagnosis of anoxic brain injury. She suffered a cardiac arrest, CVA, and fell and hit her head and suffered a concussion in 2013. She had some complications while hospitalized during that time. She now has a pacemaker and defibrillator and suffers with depression and headaches.  No family was present during this evaluation to provide history. She took the [facility] LIFT today. She attended  from 1/16/17 to 2/16/17. She reports that she had a TIA right after [place] and then subsequently hit her head on the window sill. Since the head injury, she reports getting nerve block shots. She struggles with memory and problem solving.   Past Medical History:   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    PEA arrest with subsequent long-QT    Hypertension     Pacemaker 2013    Seizures     Stroke     weakness lt side     Precautions:  memory        TIMED  Procedure Time Min.   Cognitive Therapy Start: 950  Stop: 1030  40    Start:  Stop:     Start:  Stop:     Start:  Stop:          UNTIMED  Procedure Time Min.    Start:    Stop:       Start:  Stop:      Total Minutes: 40  Total Timed Units: 3  Total Untimed Units: 0  Charges Billed/# of units: 3    Visit #: 5  Date of Evaluation: 1/6/17  Plan of Care Expiration:  3/17/17  Extended POC:   G-CODE  5/10    karmen SAVAGE/CI   update       "      Progress/Current Status    Subjective:     Pain: 1/10  Pt reports "TMJ" is acting up. She reports sleeping in her nighttime mouth guard "only when my jaw locks up." Advised pt to follow the reported MD recommendations of wearing guard every night regardless of whether TMJ is acting up.     Objective:   Short Term Goals (6 weeks):   1. Mrs. Chawla will complete short term memory tasks with 90% accuracy using compensatory strategies to increase memory.  2. Mrs. Chawla will complete moderate to complex problem solving, reasoning, mental manipulation, sequencing and organization tasks with 90% accuracy to increase cognition.   3. Mrs. Chawla will list 15 to 20 items in categories within one minute to improve word fluency and thought organization.     Current Progress toward Short Term Goals:  1. Pt completed STM tasks with 92% accuracy IND'ly (i.e. Constant Therapy Picture N Back Level 1- 100%, Constant Therapy Auditory Command Level 3- 84%). Pt reports that she burned her dinner the previous day as she forgot she was cooking. Compensatory strategies (i.e. Setting alarm on phone with label) were discussed. Pt set alarm as reminder for task she needed to complete this afternoon.   2. N/a  3. N/a     Long Term Goals (8 weeks):   1. Mrs. Chawla will increase her cognitive-linguistic skills using compensatory strategies to improve functional independence.   2.1. Mrs. Chawla will demonstrate understanding and use of compensatory strategies to improve functional independence.     Assessment:   STM continues to be a barrier to safety and functionality of daily life (i.e. Forgetting food on the stove). Compensatory strategies were discussed.     Mrs. Chawla exhibited a cognitive-linguistic impairment due to late effects from a stroke and anoxic brain injury, recent TIA, and second brain injury. Her deficits were characterized by decreased short term memory, decreased attention/concentration, decreased moderate to " complex problem-solving, reasoning, sequencing and organization. Auditory comprehension, verbal expression, voice, and fluency were within functional limits. She will benefit from outpatient neurological rehabilitation speech therapy. A repeat neuropsychological evaluation may be beneficial since the last one was in 2013.     Patient Education/Response:     Compensatory strategies for short term memory were discussed. Pt verbalized understanding.     Plans and Goals:     Certification Period: 01/04/17 to 12/31/17  Plan of Care Certification Period: 01/16/17 to 05/18/17    Continue Updated POC.    Recommended Treatment Plan:  Mrs. Chawla will   participate in the [facility] rehabilitation program for speech therapy 2 times per week to address her deficits.    Other Recommendations: Neuropsychological evaluation    ELIZABETH Saunders, CCC-SLP  4/12/2017

## 2017-04-13 ENCOUNTER — ANTI-COAG VISIT (OUTPATIENT)
Dept: CARDIOLOGY | Facility: CLINIC | Age: 52
End: 2017-04-13
Payer: MEDICARE

## 2017-04-13 DIAGNOSIS — Z79.01 LONG TERM (CURRENT) USE OF ANTICOAGULANTS: Primary | ICD-10-CM

## 2017-04-13 DIAGNOSIS — I48.0 PAROXYSMAL ATRIAL FIBRILLATION: ICD-10-CM

## 2017-04-13 LAB — INR PPP: 2.8 (ref 2–3)

## 2017-04-13 PROCEDURE — 99211 OFF/OP EST MAY X REQ PHY/QHP: CPT | Mod: PBBFAC

## 2017-04-13 PROCEDURE — 99999 PR PBB SHADOW E&M-EST. PATIENT-LVL I: CPT | Mod: PBBFAC,,,

## 2017-04-13 PROCEDURE — 85610 PROTHROMBIN TIME: CPT | Mod: PBBFAC

## 2017-04-13 NOTE — PROGRESS NOTES
Patient did not incorporate greens this past week and her INR increased almost a point. This is a fast moment causing either a change in diet or dose. She would like to resume greens once weekly and will begin today. She will incorporate one cup of greens once weekly beginning today. I stressed having a cup this week and next prior to her follow-up appointment. She denies any changes and is feeling fatigued. She will try and get rest and keep us posted on the fatigue next week. She denies GI bleed symptoms. I advised her to contact us with any changes or problems.

## 2017-04-13 NOTE — PLAN OF CARE
"PHYSICAL THERAPY RE-EVALUATION                                                      Physical Therapy Progress Note     Name: Amna Chawla  [facility] Number: [id number]  Diagnosis:   M54.81 (ICD-10-CM) - Bilateral occipital neuralgia   G93.1 (ICD-10-CM) - Anoxic brain injury   R51 (ICD-10-CM) - Cervicogenic headache     Physician: Missy Rock MD  Treatment Orders: PT Eval and Treat  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    PEA arrest with subsequent long-QT    Hypertension     Pacemaker 2013    Seizures     Stroke     weakness lt side       Precautions: standard  Visit #: 6  Date of Eval: 1/18/17  Plan of Care Expiration: 4/12/17  Extended POC: 7/5/17    Functional Limitations Reports - G Codes  Category: other   Tool: cervical rotation ROM, FGA  Score: right= 46 degrees, left= 32 degrees, 16/30    G codes 6/10    G code Mobility   eval CK-CJ   4/12/17 CK-CJ             Subjective   Pt reports: "I had a light stroke". Pt reports high heart rate- per MD this was a sign of the stroke 4-6 weeks ago. Pt is now on Coumadin to address blood clots.  Pain Scale:  TMJ- 10/10. Left shoulder pain is inconsistent, today TMJ is overpowering shoulder pain.    Objective   Blood pressure 143/100, HR 58    ROM:   UPPER EXTREMITY--AROM/PROM  (R) UE: limited as follows: shoulder AROM in supine: flex= 126, abd= 120 degrees  (L) UE: flex= 123 degrees, abd= 93 degrees      Cervical ROM:   Flex= 30 degrees  Ext= 20 degrees  Rotation: right= 46 degrees, left= 32 degrees  SB: right= 27 degrees, left= 23 degrees    Upper Extremity Strength    RUE LUE   Shoulder Flexion: 4+/5 4/5   Shoulder Abduction: 4+/5 4/5   Shoulder Extension: 4+/5 4/5   Shoulder External  Rotation: 4+/5 3+/5   Shoulder Internal  Rotation: 4+/5 4/5   Elbow Flexion: 5/5 5/5   Elbow Extension: 5/5 5/5   lats " 4-/5 3-/5   romboids 3+/5 3/5   Mid traps 3+/5 3/5   Low traps 4/5 2+/5     * Serratus weakness noted    Lower Extremity Strength    RLE LLE   Hip Flexion: 4+/5 4/5   Hip Extension:  4/5 3+/5   Hip Abduction: 4/5 3-/5   Hip Adduction: NT NT   Knee Extension: 5/5 5/5   Knee Flexion: 5/5 5/5   Ankle Dorsiflexion: NT NT   Ankle Plantarflexion: NT NT   Ankle Inversion: NT NT   Ankle Eversion: NT NT          FSWS= 6m in 5.59 sec= 1.07 m/s  SSWS= 6m in 6.79 sec= 0.88 m/s    Functional Gait Assessment:   1. Gait on level surface =  2, 6.79   (3) Normal: less than 5.5 sec, no A.D., no imbalance, normal gait pattern, deviates< 6in   (2) Mild impairment: 7-5.6 sec, uses A.D., mild gait deviations, or deviates 6-10 in   (1) Moderate impairment: > 7 sec, slow speed, imbalance, deviates 10-15 in.   (0) Severe impairment: needs assist, deviates >15 in, reach/touch wall  2. Change in Gait Speed = 2   (3) Normal: smooth change w/o loss of balance or gait deviation, deviates < 6 in, significant difference between speeds   (2) Mild impairment: changes speed, but demonstrates mild gait deviations, deviates 6-10 in, OR no deviations but unable to significantly speed, OR uses A.D.   (1) Moderate impairment: minor changes to speed, OR changes speed w/ significant deviations, deviates 10-15 in, OR  Changes speed , but loses balance & recovers   (0) Severe impairment: cannot change speed, deviates >15 in, or loses balance & needs assist  3. Gait with horizontal head turns  = 1   (3) Normal: no change in gait, deviates <6 in   (2) Mild impairment: slight change in speed, deviates 6-10 in, OR uses A.D.   (1) Moderate impairment: moderate change in speed, deviates 10-15 in   (0) Severe impairment: severe disruption of gait, deviates >15in  4. Gait with vertical head turns = 1   (3) Normal: no change in gait, deviates <6 in   (2) Mild impairment: slight change in speed, deviates 6-10 in OR uses A.D.   (1) Moderate impairment: moderate change  in speed, deviates 10-15 in   (0) Severe impairment: severe disruption of gait, deviates >15 in  5. Gait with pivot turns = 2   (3) Normal: performs safely in 3 sec, no LOB   (2) Mild impairment: performs in >3 sec & no LOB, OR turns safely & requires several steps to regain LOB   (1) Moderate impairment: turns slow, OR requires several small steps for balance following turn & stop   (0) Severe impairment: cannot turn safely, needs assist  6. Step over obstacle = 1   (3) Normal: steps over 2 stacked boxes w/o change in speed or LOB   (2) Mild impairment: able to step over 1 box w/o change in speed or LOB   (1) Moderate impairment: steps over 1 box but must slow down, may require VC   (0) Severe impairment: cannot perform w/o assist  7. Gait with Narrow GAGE = 3   (3) Normal: 10 steps no staggering   (2) Mild impairment: 7-9 steps   (1) Moderate impairment: 4-7 steps   (0) Severe impairment: < 4 steps or cannot perform w/o assist  8. Gait with eyes closed = 1   (3) Normal: < 7 sec, no A.D., no LOB, normal gait pattern, deviates <6 in   (2) Mild impairment: 7.1-9 sec, mild gait deviations, deviates 6-10 in   (1) Moderate impairment: > 9 sec, abnormal pattern, LOB, deviates 10-15 in   (0) Severe impairment: cannot perform w/o assist, LOB, deviates >15in  9. Ambulating Backwards = 1   (3) Normal: no A.D., no LOB, normal gait pattern, deviates <6in   (2) Mild impairment: uses A.D., slower speed, mild gait deviations, deviates 6-10 in   (1) Moderate impairment: slow speed, abnormal gait pattern, LOB, deviates 10-15 in   (0) Severe impairment: severe gait deviations or LOB, deviates >15in  10. Steps = 2   (3) Normal: alternating feet, no rail   (2) Mild Impairment: alternating feet, uses rail   (1) Moderate impairment: step-to, uses rail   (0) Severe impairment: cannot perform safely  Score 16/30= fall risk    Written Home Exercises: upper trap stretch, levator scap stretch, chin tucks, pencil push ups  Pt demo good  "understanding of the education provided. Amna demonstrated good return demonstration of activities.     Education provided re: POC, HEP  No spiritual or educational barriers to learning provided    Pt has no cultural, educational or language barriers to learning provided.    Pt participated in the following treatment today:   Re-evaluation x 30 min  Gait x 15 minutes (FGA)    Assessment   51 yr old female with diagnosis of anoxic BI and CHI was re-evaluated by PT after not attending sessions for a few months. Over this time, pt reports a new onset of CVA and "hitting head resulting in a severe headache. Pt also reports she is now on Coumadin. Pt presents with decreased cervical and left shoulder AROM. Pt also demonstrates decreased strength of left extremities. Per formal assessment, pt is at risk for falls. Goals revised as needed to address current functional limitations. Pt's progress may be affected by history of chronic headaches since anoxic BI. Pt can benefit from skilled PT to address impairments to decrease pain, decrease fall risk, and improve functional independence. Pt was instructed to continue previous stretches that were provided for cervical ROM. Pt presents with an 53% average impairment for mobility, pt is anticipated to improve impairment to 28%. Pt did not complete subjective assessments, will complete with pt next visit.     Pt rehab potential is Good. Pt will benefit from continuing skilled outpatient physical therapy to address the deficits listed below in the problem list, provide pt/family education and to maximize pt's level of independence in the home and community environment.         Medical necessity is demonstrated by the following IMPAIRMENTS/PROMBLEM LIST:   1. Fall Risk - impaired balance   2. Weakness   3. Impaired muscle tone  4. Decreased ROM  5. Gait deviations   6. Impaired ambulation   7. Decreased activity tolerance   8. Difficulty participating in daily activities "   9. Continued inability to participate in vocational pursuits   10. Requires skilled supervision to complete and progress HEP      Anticipated barriers to physical therapy: history of CHI and CVA since initial evaluation.      Pt's spiritual, cultural and educational needs considered and pt agreeable to plan of care and goals as stated below:      GOALS:   Goals appropriate as of 4/12/17:   Short term goals: 6 weeks, pt agrees to goals set.  1. Pt will perform HEP for UE/ scapular strengthening and cervical ROM/ stability with supervision to improve carryover of progress and decrease pain.  2. Pt will demonstrate improved cervical flex/ ext ROM by 10 degrees to demonstrate improved ROM to improve posture.  3. Pt will demonstrate improved cervical rotation to 50 degrees to decrease pain.   4. Pt will demonstrate improved bilateral shoulder AROM for flexion to 140 degrees to be WFLs.  5. Pt will report decreased left shoulder pain to 5/10 with AROM to improve mobility and demonstrate decreased pain.  6. Pt will demonstrate improved balance by scoring 19/30 on Functional Gait Assessment.     Long term goals: 12 weeks, pt agrees to goals set  8. Pt will report decreased HA to 3x/ week or less with no ER visits due to pain x 4 weeks.  9. Pt will demonstrate improved cervical flex/ ext AROM to 50 degrees to demonstrate functional ROM and to decrease pain.  10. Pt will demonstrate improved cervical rotation to 65 degrees to demonstrate improved mobility and decrease HA.  11. Pt will demonstrate bilateral shoulder AROM for abd to 130 degrees to be WFLs.   12. Pt will score 23/30 on Functional Gait Assessment to demonstrate improved balance and decreased fall risk.   13. Pt will demonstrate gait velocity WFLs for diagnosis by ambulating 1.24 m/s.  14. Pt will demonstrate improved balance and decreased fall risk by scoring 22/30 on FGA.             Plan   Continue PT 2-3 x 12 weeks under established Plan of Care, with  treatment to include: pt education, HEP, therapeutic exercises, neuromuscular re-education/balance exercises, therapeutic activities, joint mobilizations, and modalities PRN, to work towards established goals. Pt may be seen by PTA to carry out plan of care.     Mariel Miguel, PT   04/12/2017    I certify the need for these services furnished under this plan of treatment and while under my care.  ____________________________________ Physician/Referring Practitioner   Date of Signature

## 2017-04-17 ENCOUNTER — CLINICAL SUPPORT (OUTPATIENT)
Dept: NEUROLOGY | Facility: CLINIC | Age: 52
End: 2017-04-17
Payer: MEDICARE

## 2017-04-17 DIAGNOSIS — G43.001 MIGRAINE WITHOUT AURA AND WITH STATUS MIGRAINOSUS, NOT INTRACTABLE: Primary | ICD-10-CM

## 2017-04-17 PROCEDURE — 96372 THER/PROPH/DIAG INJ SC/IM: CPT | Mod: PBBFAC

## 2017-04-17 RX ORDER — CYANOCOBALAMIN 1000 UG/ML
1000 INJECTION, SOLUTION INTRAMUSCULAR; SUBCUTANEOUS
Status: DISCONTINUED | OUTPATIENT
Start: 2017-04-17 | End: 2018-04-16

## 2017-04-17 RX ADMIN — CYANOCOBALAMIN 1000 MCG: 1000 INJECTION, SOLUTION INTRAMUSCULAR at 01:04

## 2017-04-18 ENCOUNTER — CLINICAL SUPPORT (OUTPATIENT)
Dept: REHABILITATION | Facility: HOSPITAL | Age: 52
End: 2017-04-18
Attending: PSYCHIATRY & NEUROLOGY
Payer: MEDICARE

## 2017-04-18 DIAGNOSIS — G44.309 HEADACHES DUE TO OLD HEAD INJURY: ICD-10-CM

## 2017-04-18 DIAGNOSIS — Z78.9 IMPAIRED MOBILITY AND ADLS: ICD-10-CM

## 2017-04-18 DIAGNOSIS — M25.571 CHRONIC PAIN OF RIGHT ANKLE: ICD-10-CM

## 2017-04-18 DIAGNOSIS — F06.8 COGNITIVE DEFICIT AS LATE EFFECT OF TRAUMATIC BRAIN INJURY: Primary | ICD-10-CM

## 2017-04-18 DIAGNOSIS — M25.612 DECREASED ROM OF LEFT SHOULDER: ICD-10-CM

## 2017-04-18 DIAGNOSIS — G89.29 CHRONIC PAIN OF RIGHT ANKLE: ICD-10-CM

## 2017-04-18 DIAGNOSIS — G93.1 ANOXIC BRAIN INJURY: Primary | ICD-10-CM

## 2017-04-18 DIAGNOSIS — S09.90XS HEADACHES DUE TO OLD HEAD INJURY: ICD-10-CM

## 2017-04-18 DIAGNOSIS — S09.90XD CHI (CLOSED HEAD INJURY), SUBSEQUENT ENCOUNTER: ICD-10-CM

## 2017-04-18 DIAGNOSIS — S06.9X0S COGNITIVE DEFICIT AS LATE EFFECT OF TRAUMATIC BRAIN INJURY: Primary | ICD-10-CM

## 2017-04-18 DIAGNOSIS — M54.2 CERVICALGIA: ICD-10-CM

## 2017-04-18 DIAGNOSIS — R29.898 DECREASED ROM OF NECK: Primary | ICD-10-CM

## 2017-04-18 DIAGNOSIS — Z74.09 IMPAIRED MOBILITY AND ADLS: ICD-10-CM

## 2017-04-18 DIAGNOSIS — Z74.09 IMPAIRED FUNCTIONAL MOBILITY, BALANCE, GAIT, AND ENDURANCE: ICD-10-CM

## 2017-04-18 DIAGNOSIS — R41.89 COGNITIVE DEFICITS: ICD-10-CM

## 2017-04-18 PROCEDURE — 97532 *HC SP COG SKL DEV EA 15 MIN: CPT | Mod: PO

## 2017-04-18 PROCEDURE — 97140 MANUAL THERAPY 1/> REGIONS: CPT | Mod: PO | Performed by: PHYSICAL THERAPIST

## 2017-04-18 PROCEDURE — 97110 THERAPEUTIC EXERCISES: CPT | Mod: PO | Performed by: PHYSICAL THERAPIST

## 2017-04-18 NOTE — PROGRESS NOTES
"                                                    Physical Therapy Progress Note     Name: Amna Chawla  Clinic Number: [id number]  Diagnosis:  M54.81 (ICD-10-CM) - Bilateral occipital neuralgia   G93.1 (ICD-10-CM) - Anoxic brain injury   R51 (ICD-10-CM) - Cervicogenic headache         Physician: Missy Rock MD  Treatment Orders: PT Eval and Treat  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    PEA arrest with subsequent long-QT    Hypertension     Pacemaker 2013    Seizures     Stroke     weakness lt side       Precautions: standard  Visit #:  2  (visit total= 7)  Date of Eval: 1/18/17  Re eval: 4/12/2017  Plan of Care Expiration: 7/5/2017    Functional Limitations Reports - G Codes  Category: other   Tool: cervical rotation ROM, FGA  Score: right= 46 degrees, left= 32 degrees, 16/30     G codes 1/10    G code Mobility   eval CK-CJ   4/12/17 CK-CJ               Subjective   Pt reports: I lost my cane  Pain Scale:  10/10 HA, began yesterday with "brain" homework    Objective     Patient received individual therapy to increase ROM, flexibility, posture and decrease pain with activities as follows:     Pt participated in therapeutic exercises x 30 minutes:   Lat pulls GTB 2 x 10  Rows GTB 2 x 10  PROM cervical rotation, upper trap stretch, left shoulder ER, left shoulder flexion, left shoulder abd  Chin tucks 20x, min A for technique  side lying:  UE horizontal abd 1# 2 x 10    UE vertical abd 1# 2 x 10    Serratus stars YTB 10x    Pt received manual therapy treatment x 10 minutes to address pain and posture:   inhibitive distraction  Cervical upslides    Written Home Exercises: 4/18/17- issued OTB for serratus stars, GTB for lat pulls and rows, chin tucks  Pt demo good understanding of the education provided. Amna demonstrated good return demonstration of " activities.     Education provided re: POC, HEP    Pt has no cultural, educational or language barriers to learning provided.    Assessment   Amna tolerated treatment well, reporting a slight decrease in headache intensity at conclusion of PT. Pt reports headache began with cognitive tasks/ stimulation yesterday. Pt reported a decrease in headache with deep pressure to suboccipitals. Pt was re educated on forward head posture and possibility of posture increasing headaches. PT initiated scapular strengthening to improve forward head posture. Pt issued HEP to address. Pt can benefit from continued skilled PT to address impairments to improve posture, increase functional use of LUE, and decrease headaches to improve independence and quality of life.     Pt prognosis is Good. Pt will continue to benefit from skilled outpatient physical therapy to address the deficits listed in the problem list, provide pt/family education and to maximize pt's level of independence in the home and community environment.     Anticipated barriers to physical therapy: history of CHI and CVA since initial evaluation.     Medical necessity is demonstrated by the following IMPAIRMENTS/PROMBLEM LIST:   1. Fall Risk - impaired balance   2. Weakness   3. Impaired muscle tone  4. Decreased ROM  5. Gait deviations   6. Impaired ambulation   7. Decreased activity tolerance   8. Difficulty participating in daily activities   9. Continued inability to participate in vocational pursuits   10. Requires skilled supervision to complete and progress HEP       Pt's spiritual, cultural and educational needs considered and pt agreeable to plan of care and GOALS as stated below:   GOALS:   Goals appropriate as of 4/12/17:   Short term goals: 6 weeks, pt agrees to goals set.  11. Pt will perform HEP for UE/ scapular strengthening and cervical ROM/ stability with supervision to improve carryover of progress and decrease pain.  12. Pt will demonstrate improved  cervical flex/ ext ROM by 10 degrees to demonstrate improved ROM to improve posture.  13. Pt will demonstrate improved cervical rotation to 50 degrees to decrease pain.   14. Pt will demonstrate improved bilateral shoulder AROM for flexion to 140 degrees to be WFLs.  15. Pt will report decreased left shoulder pain to 5/10 with AROM to improve mobility and demonstrate decreased pain.  16. Pt will demonstrate improved balance by scoring 19/30 on Functional Gait Assessment.      Long term goals: 12 weeks, pt agrees to goals set  8. Pt will report decreased HA to 3x/ week or less with no ER visits due to pain x 4 weeks.  9. Pt will demonstrate improved cervical flex/ ext AROM to 50 degrees to demonstrate functional ROM and to decrease pain.  10. Pt will demonstrate improved cervical rotation to 65 degrees to demonstrate improved mobility and decrease HA.  11. Pt will demonstrate bilateral shoulder AROM for abd to 130 degrees to be WFLs.   12. Pt will score 23/30 on Functional Gait Assessment to demonstrate improved balance and decreased fall risk.   13. Pt will demonstrate gait velocity WFLs for diagnosis by ambulating 1.24 m/s.  14. Pt will demonstrate improved balance and decreased fall risk by scoring 22/30 on FGA.      Plan   Continue PT 1-2x weekly under established Plan of Care, with treatment to include: pt education, HEP, therapeutic exercises, neuromuscular re-education/balance exercises, therapeutic activities, joint mobilizations, manual therapy, and modalities PRN, to work towards established goals. Pt may be seen by PTA to carry out plan of care.     Mariel Miguel, PT   04/18/2017

## 2017-04-18 NOTE — PROGRESS NOTES
OUTPATIENT NEUROLOGICAL REHABILITATION  SPEECH THERAPY PROGRESS NOTE    Date:  04/18/2017    Start Time:  1020  Stop Time:  1105    Subjective/History  Onset Date:  February 7, 2013  Primary Diagnosis:  Anoxic Brain Injury  Treatment Diagnosis:  Cognitive-Linguistic Impairment  Referring Provider:  Dr. Rock  Orders:  for evaluation and treat  Current Medical History:  Mrs. Chawla presents to the [facility] secondary to the diagnosis of anoxic brain injury. She suffered a cardiac arrest, CVA, and fell and hit her head and suffered a concussion in 2013. She had some complications while hospitalized during that time. She now has a pacemaker and defibrillator and suffers with depression and headaches.  No family was present during this evaluation to provide history. She took the [facility] LIFT today. She attended  from 1/16/17 to 2/16/17. She reports that she had a TIA right after [place] and then subsequently hit her head on the window sill. Since the head injury, she reports getting nerve block shots. She struggles with memory and problem solving.   Past Medical History:   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    PEA arrest with subsequent long-QT    Hypertension     Pacemaker 2013    Seizures     Stroke     weakness lt side     Precautions:  memory        TIMED  Procedure Time Min.   Cognitive Therapy Start:  Stop:   45    Start:  Stop:     Start:  Stop:     Start:  Stop:          UNTIMED  Procedure Time Min.    Start:    Stop:       Start:  Stop:      Total Minutes: 40  Total Timed Units: 3  Total Untimed Units: 0  Charges Billed/# of units: 3    Visit #: 6  Date of Evaluation: 1/6/17  Plan of Care Expiration:  3/17/17  Extended POC: 5/18/17  G-CODE  6/10    eval  CJ/CI   update             Progress/Current Status    Subjective:     Pain: 10/10  Pt was alert and communicative. She reported that she had not yet taken her pain medication because it makes her drowsy. Lumosity fit test completed    Objective:   Short Term Goals (6 weeks): Current progress as of 4/18/17  1. Mrs. Chawla will complete short term memory tasks with 90% accuracy using compensatory strategies to increase memory.  - mental manipulation: field of 4: scrambled sentences: 90% acc ind'ly  -Functional memory, 3-4 element inclusion: 60% acc ind'ly. Pt was read sentences and asked to recall the main idea.   - Lumosity fit test revealed: Pt 2% better than age matched peers    2. Mrs. Chawla will complete moderate to complex problem solving, reasoning, mental manipulation, sequencing and organization tasks with 90% accuracy to increase cognition.   - Scrambled sentences: 100% acc ind'ly  - Scrambled words: 100% acc ind'ly  - Deducing compound words: 100% acc ind'ly  - Lumosity fit test revealed: 98% better than peers in speed, 31% better than peers in attention  - Pt stated similarities and differences between picture objects with 80% acc ind'ly, 100% acc given cues.     3. Mrs. Chawla will list 15 to 20 items in categories within one minute to improve word fluency and thought organization.   - Cities: 14  - Animals: 26  Long Term Goals (8 weeks):   1. Mrs. Chawla will increase her cognitive-linguistic skills using compensatory strategies to improve functional independence.   2. Mrs. Chawla will demonstrate understanding and use of compensatory strategies to improve functional independence.     Assessment:   Pt to complete lumosity at home. Pt is progressing towards short term goals.     Mrs. Chawla exhibited a cognitive-linguistic impairment due to late effects from a stroke and anoxic brain injury, recent TIA, and second brain injury. Her deficits were characterized by decreased short term memory, decreased  attention/concentration, decreased moderate to complex problem-solving, reasoning, sequencing and organization. Auditory comprehension, verbal expression, voice, and fluency were within functional limits. She will benefit from outpatient neurological rehabilitation speech therapy. A repeat neuropsychological evaluation may be beneficial since the last one was in 2013.     Patient Education/Response:     Compensatory strategies for short term memory were discussed. Pt verbalized understanding.     Plans and Goals:     Certification Period: 01/04/17 to 12/31/17  Plan of Care Certification Period: 01/16/17 to 05/18/17    Continue Updated POC.    Recommended Treatment Plan:  Mrs. Chawla will   participate in the [facility] neurological rehabilitation program for speech therapy 2 times per week to address her deficits.    Other Recommendations: Neuropsychological evaluation    KINA Nunez   Clinician   4/18/2017

## 2017-04-20 ENCOUNTER — CLINICAL SUPPORT (OUTPATIENT)
Dept: REHABILITATION | Facility: HOSPITAL | Age: 52
End: 2017-04-20
Attending: PSYCHIATRY & NEUROLOGY
Payer: MEDICARE

## 2017-04-20 DIAGNOSIS — Z78.9 IMPAIRED MOBILITY AND ADLS: ICD-10-CM

## 2017-04-20 DIAGNOSIS — M54.2 CERVICALGIA: ICD-10-CM

## 2017-04-20 DIAGNOSIS — S09.90XS HEADACHES DUE TO OLD HEAD INJURY: ICD-10-CM

## 2017-04-20 DIAGNOSIS — R29.898 DECREASED ROM OF NECK: Primary | ICD-10-CM

## 2017-04-20 DIAGNOSIS — M25.612 DECREASED ROM OF LEFT SHOULDER: ICD-10-CM

## 2017-04-20 DIAGNOSIS — S06.9X0S COGNITIVE DEFICIT AS LATE EFFECT OF TRAUMATIC BRAIN INJURY: Primary | ICD-10-CM

## 2017-04-20 DIAGNOSIS — F06.8 COGNITIVE DEFICIT AS LATE EFFECT OF TRAUMATIC BRAIN INJURY: Primary | ICD-10-CM

## 2017-04-20 DIAGNOSIS — Z74.09 IMPAIRED MOBILITY AND ADLS: ICD-10-CM

## 2017-04-20 DIAGNOSIS — G44.309 HEADACHES DUE TO OLD HEAD INJURY: ICD-10-CM

## 2017-04-20 DIAGNOSIS — Z74.09 IMPAIRED FUNCTIONAL MOBILITY, BALANCE, GAIT, AND ENDURANCE: ICD-10-CM

## 2017-04-20 PROCEDURE — 97110 THERAPEUTIC EXERCISES: CPT | Mod: PO | Performed by: PHYSICAL THERAPIST

## 2017-04-20 PROCEDURE — 97140 MANUAL THERAPY 1/> REGIONS: CPT | Mod: PO | Performed by: PHYSICAL THERAPIST

## 2017-04-20 PROCEDURE — 97532 *HC SP COG SKL DEV EA 15 MIN: CPT | Mod: PO

## 2017-04-20 NOTE — PROGRESS NOTES
OUTPATIENT NEUROLOGICAL REHABILITATION  SPEECH THERAPY PROGRESS NOTE    Date:  04/20/2017    Start Time: 1115  Stop Time:  1200    Subjective/History  Onset Date:  February 7, 2013  Primary Diagnosis:  Anoxic Brain Injury  Treatment Diagnosis:  Cognitive-Linguistic Impairment  Referring Provider:  Dr. Rock  Orders:  for evaluation and treat  Current Medical History:  Mrs. Chawla presents to the [facility] secondary to the diagnosis of anoxic brain injury. She suffered a cardiac arrest, CVA, and fell and hit her head and suffered a concussion in 2013. She had some complications while hospitalized during that time. She now has a pacemaker and defibrillator and suffers with depression and headaches.  No family was present during this evaluation to provide history. She took the RTA LIFT today. She attended  from 1/16/17 to 2/16/17. She reports that she had a TIA right after [place] and then subsequently hit her head on the window sill. Since the head injury, she reports getting nerve block shots. She struggles with memory and problem solving.   Past Medical History:   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    PEA arrest with subsequent long-QT    Hypertension     Pacemaker 2013    Seizures     Stroke     weakness lt side     Precautions:  memory        TIMED  Procedure Time Min.   Cognitive Therapy Start:  Stop:   45    Start:  Stop:     Start:  Stop:     Start:  Stop:          UNTIMED  Procedure Time Min.    Start:    Stop:       Start:  Stop:      Total Minutes: 40  Total Timed Units: 3  Total Untimed Units: 0  Charges Billed/# of units: 3    Visit #: 7  Date of Evaluation: 1/6/17  Plan of Care Expiration:  3/17/17  Extended POC: 5/18/17  G-CODE  7/10    karmen  CJ/CI   update             Progress/Current Status    Subjective:     Pain: 0/10  Pt was alert and communicative.     Objective:   Short Term Goals (6 weeks): Current progress as of 4/20/17  1. Mrs. Chawla will complete short term memory tasks with 90% accuracy using compensatory strategies to increase memory.  - Lumosity memory task: 100% acc ind'ly  - Picture and back lumosity task: 100% acc ind'ly    2. Mrs. Chawla will complete moderate to complex problem solving, reasoning, mental manipulation, sequencing and organization tasks with 90% accuracy to increase cognition.   - Lumosity word ordering by intensity: 50% acc ind'ly  - Lumosity ordering pictures: 100% acc ind'ly    3. Mrs. Chawla will list 15 to 20 items in categories within one minute to improve word fluency and thought organization.   - goal not addressed today    Long Term Goals (8 weeks):   1. Mrs. Chawla will increase her cognitive-linguistic skills using compensatory strategies to improve functional independence.   2. Mrs. Chawla will demonstrate understanding and use of compensatory strategies to improve functional independence.     Assessment:   Pt to complete lumosity at home. Pt is progressing towards short term goals.     Mrs. Chawla exhibited a cognitive-linguistic impairment due to late effects from a stroke and anoxic brain injury, recent TIA, and second brain injury. Her deficits were characterized by decreased short term memory, decreased attention/concentration, decreased moderate to complex problem-solving, reasoning, sequencing and organization. Auditory comprehension, verbal expression, voice, and fluency were within functional limits. She will benefit from outpatient neurological rehabilitation speech therapy. A repeat neuropsychological evaluation may be beneficial since the last one was in 2013.     Patient Education/Response:     Compensatory strategies for short term memory were discussed. Pt verbalized understanding.     Plans and Goals:      Certification Period: 01/04/17 to 12/31/17  Plan of Care Certification Period: 01/16/17 to 05/18/17    Continue Updated POC.    Recommended Treatment Plan:  Mrs. Chawla will   participate in the [facility] program for speech therapy 2 times per week to address her deficits.    Other Recommendations: Neuropsychological evaluation    KINA Nunez   Clinician   4/20/2017

## 2017-04-20 NOTE — PROGRESS NOTES
Physical Therapy Progress Note     Name: Amna Chawla  [facility] Number: [id number]  Diagnosis:  M54.81 (ICD-10-CM) - Bilateral occipital neuralgia   G93.1 (ICD-10-CM) - Anoxic brain injury   R51 (ICD-10-CM) - Cervicogenic headache         Physician: Missy Rock MD  Treatment Orders: PT Eval and Treat  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    PEA arrest with subsequent long-QT    Hypertension     Pacemaker 2013    Seizures     Stroke     weakness lt side       Precautions: standard  Visit #:  8  Date of Eval: 1/18/17  Re eval: 4/12/2017  Plan of Care Expiration: 7/5/2017    Functional Limitations Reports - G Codes  Category: other   Tool: cervical rotation ROM, FGA  Score: right= 46 degrees, left= 32 degrees, 16/30     G codes 3/10    G code Mobility   eval CK-CJ   4/12/17 CK-CJ               Subjective   Pt reports: I couldn't get my cane yet. Pt reports compliance with HEP.   Pain Scale:  denies    Objective     Patient received individual therapy to increase ROM, flexibility, posture and decrease pain with activities as follows:     Pt participated in therapeutic exercises x 30 minutes:     PROM cervical rotation, upper trap stretch, levator scap, pecs, left shoulder ER (normal), left shoulder flexion (normal), left shoulder abd (normal)    side lying:  UE horizontal abd 1# 2 x 10    UE vertical abd 1# 2 x 10    Sitting: BUE ER with OTB 2 x 10    Standing: shoulder extension in stance GTB 2 x 10   Serratus stars YTB 10x    Pt received manual therapy treatment x 10 minutes to address pain and posture:   inhibitive distraction      Written Home Exercises: 4/18/17- issued OTB for serratus stars, GTB for lat pulls and rows, chin tucks  Pt demo good understanding of the education provided. Amna  demonstrated good return demonstration of activities.     Education provided re: POC, HEP    Pt has no cultural, educational or language barriers to learning provided.    Assessment   Amna tolerated treatment well. Pt reports a bad headache yesterday, denies headache or pain today. Pt presents with decreased forward head posture. Amna reports good compliance with HEP. PT had pt practice UE strengthening in stance to address postural stability for balance. Pt can benefit from continued skilled PT to address impairments to improve posture, increase functional use of LUE, and decrease headaches to improve independence and quality of life.     Pt prognosis is Good. Pt will continue to benefit from skilled outpatient physical therapy to address the deficits listed in the problem list, provide pt/family education and to maximize pt's level of independence in the home and community environment.     Anticipated barriers to physical therapy: history of CHI and CVA since initial evaluation.     Medical necessity is demonstrated by the following IMPAIRMENTS/PROMBLEM LIST:   1. Fall Risk - impaired balance   2. Weakness   3. Impaired muscle tone  4. Decreased ROM  5. Gait deviations   6. Impaired ambulation   7. Decreased activity tolerance   8. Difficulty participating in daily activities   9. Continued inability to participate in vocational pursuits   10. Requires skilled supervision to complete and progress HEP       Pt's spiritual, cultural and educational needs considered and pt agreeable to plan of care and GOALS as stated below:   GOALS:   Goals appropriate as of 4/12/17:   Short term goals: 6 weeks, pt agrees to goals set.  11. Pt will perform HEP for UE/ scapular strengthening and cervical ROM/ stability with supervision to improve carryover of progress and decrease pain.  12. Pt will demonstrate improved cervical flex/ ext ROM by 10 degrees to demonstrate improved ROM to improve posture.  13. Pt will demonstrate  improved cervical rotation to 50 degrees to decrease pain.   14. Pt will demonstrate improved bilateral shoulder AROM for flexion to 140 degrees to be WFLs.  15. Pt will report decreased left shoulder pain to 5/10 with AROM to improve mobility and demonstrate decreased pain.  16. Pt will demonstrate improved balance by scoring 19/30 on Functional Gait Assessment.      Long term goals: 12 weeks, pt agrees to goals set  8. Pt will report decreased HA to 3x/ week or less with no ER visits due to pain x 4 weeks.  9. Pt will demonstrate improved cervical flex/ ext AROM to 50 degrees to demonstrate functional ROM and to decrease pain.  10. Pt will demonstrate improved cervical rotation to 65 degrees to demonstrate improved mobility and decrease HA.  11. Pt will demonstrate bilateral shoulder AROM for abd to 130 degrees to be WFLs.   12. Pt will score 23/30 on Functional Gait Assessment to demonstrate improved balance and decreased fall risk.   13. Pt will demonstrate gait velocity WFLs for diagnosis by ambulating 1.24 m/s.  14. Pt will demonstrate improved balance and decreased fall risk by scoring 22/30 on FGA.      Plan   Continue PT 1-2x weekly under established Plan of Care, with treatment to include: pt education, HEP, therapeutic exercises, neuromuscular re-education/balance exercises, therapeutic activities, joint mobilizations, manual therapy, and modalities PRN, to work towards established goals. Pt may be seen by PTA to carry out plan of care.     Mariel Miguel, PT   04/20/2017

## 2017-04-21 ENCOUNTER — TELEPHONE (OUTPATIENT)
Dept: ORTHOPEDICS | Facility: CLINIC | Age: 52
End: 2017-04-21

## 2017-04-21 ENCOUNTER — ANTI-COAG VISIT (OUTPATIENT)
Dept: CARDIOLOGY | Facility: CLINIC | Age: 52
End: 2017-04-21
Payer: MEDICARE

## 2017-04-21 ENCOUNTER — DOCUMENTATION ONLY (OUTPATIENT)
Dept: REHABILITATION | Facility: HOSPITAL | Age: 52
End: 2017-04-21

## 2017-04-21 DIAGNOSIS — I48.0 PAROXYSMAL ATRIAL FIBRILLATION: ICD-10-CM

## 2017-04-21 DIAGNOSIS — Z79.01 LONG TERM (CURRENT) USE OF ANTICOAGULANTS: Primary | ICD-10-CM

## 2017-04-21 LAB — INR PPP: 1.5 (ref 2–3)

## 2017-04-21 PROCEDURE — 99211 OFF/OP EST MAY X REQ PHY/QHP: CPT | Mod: PBBFAC | Performed by: PHARMACIST

## 2017-04-21 PROCEDURE — 99999 PR PBB SHADOW E&M-EST. PATIENT-LVL I: CPT | Mod: PBBFAC,,, | Performed by: PHARMACIST

## 2017-04-21 PROCEDURE — 85610 PROTHROMBIN TIME: CPT | Mod: PBBFAC | Performed by: PHARMACIST

## 2017-04-21 NOTE — PROGRESS NOTES
I, Mariel Miguel, DPT, met with Alexander Benjamin PTA to discuss this pt's POC. PT is addressing improving forward head and rounded shoulders posture with stretching and strengthening. Perform STM only if painful. Also address left shoulder ROM as needed and balance.     Mariel Miguel, DPT  4/21/2017    Alexander Benjamin PTA

## 2017-04-21 NOTE — PROGRESS NOTES
Patient reports the only difference she can think of in the last week is she incorporated 1 cup of greens per week. She would like to continue this. I will increase her dose with follow up in 1 week.

## 2017-04-25 ENCOUNTER — PATIENT MESSAGE (OUTPATIENT)
Dept: ELECTROPHYSIOLOGY | Facility: CLINIC | Age: 52
End: 2017-04-25

## 2017-04-25 DIAGNOSIS — I48.0 PAROXYSMAL ATRIAL FIBRILLATION: ICD-10-CM

## 2017-04-25 RX ORDER — WARFARIN SODIUM 5 MG/1
TABLET ORAL
Qty: 1060 TABLET | Refills: 3 | Status: SHIPPED | OUTPATIENT
Start: 2017-04-25 | End: 2017-08-21 | Stop reason: SDUPTHER

## 2017-04-25 RX ORDER — WARFARIN SODIUM 5 MG/1
7.5-1 TABLET ORAL DAILY
Qty: 60 TABLET | Refills: 3 | Status: SHIPPED | OUTPATIENT
Start: 2017-04-25 | End: 2017-06-28 | Stop reason: SDUPTHER

## 2017-04-27 ENCOUNTER — ANTI-COAG VISIT (OUTPATIENT)
Dept: CARDIOLOGY | Facility: CLINIC | Age: 52
End: 2017-04-27
Payer: MEDICARE

## 2017-04-27 DIAGNOSIS — Z79.01 LONG TERM (CURRENT) USE OF ANTICOAGULANTS: Primary | ICD-10-CM

## 2017-04-27 DIAGNOSIS — I48.0 PAROXYSMAL ATRIAL FIBRILLATION: ICD-10-CM

## 2017-04-27 LAB — INR PPP: 2.3 (ref 2–3)

## 2017-04-27 PROCEDURE — 99211 OFF/OP EST MAY X REQ PHY/QHP: CPT | Mod: PBBFAC | Performed by: PHARMACIST

## 2017-04-27 PROCEDURE — 99999 PR PBB SHADOW E&M-EST. PATIENT-LVL I: CPT | Mod: PBBFAC,,, | Performed by: PHARMACIST

## 2017-04-27 PROCEDURE — 85610 PROTHROMBIN TIME: CPT | Mod: PBBFAC | Performed by: PHARMACIST

## 2017-04-27 NOTE — PROGRESS NOTES
Patient here today for a quick follow up from last week on 4/21 when her INR was low. She confirms dose and compliance. Patient denies any changes in diet, medications, or health that would effect warfarin therapy.

## 2017-05-02 ENCOUNTER — OFFICE VISIT (OUTPATIENT)
Dept: NEUROLOGY | Facility: CLINIC | Age: 52
End: 2017-05-02
Attending: PSYCHIATRY & NEUROLOGY
Payer: MEDICARE

## 2017-05-02 VITALS
WEIGHT: 265.19 LBS | BODY MASS INDEX: 45.27 KG/M2 | HEIGHT: 64 IN | SYSTOLIC BLOOD PRESSURE: 120 MMHG | HEART RATE: 60 BPM | DIASTOLIC BLOOD PRESSURE: 90 MMHG

## 2017-05-02 DIAGNOSIS — G47.8 UARS (UPPER AIRWAY RESISTANCE SYNDROME): ICD-10-CM

## 2017-05-02 DIAGNOSIS — G47.30 UNSPECIFIED SLEEP APNEA: Primary | ICD-10-CM

## 2017-05-02 PROCEDURE — 99214 OFFICE O/P EST MOD 30 MIN: CPT | Mod: PBBFAC | Performed by: NURSE PRACTITIONER

## 2017-05-02 PROCEDURE — 99999 PR PBB SHADOW E&M-EST. PATIENT-LVL IV: CPT | Mod: PBBFAC,,, | Performed by: NURSE PRACTITIONER

## 2017-05-02 PROCEDURE — 99213 OFFICE O/P EST LOW 20 MIN: CPT | Mod: S$PBB,,, | Performed by: NURSE PRACTITIONER

## 2017-05-02 NOTE — PROGRESS NOTES
"Ms. Chawla returns today regarding the management of UARS. She was last seen 5/15/14.  She continues to report daily headache s/p concussion, mild snoring and persistent disrupted sleep. Sleep is more refreshing. Mild daytime sleepiness. She has nocturia 3-4x/night. She sleeps alone so unsure if air gasping . Has lost 33# since study 2014.     BASELINE PSG: AHI was 2.1 with an oxygen rodo of 89.0%. The supine AHI was 12.1 and the REM AHI was 5.6. The RDI (Respiratory Disturbance Index) was much greater than the AHI due to the presence of frequent RERAs (respiratory effort related arousals), primarily while sleeping supine. The overall RDI was 8.9. The total respiratory arousal index was 8.2.    Alternates trazadone 200mg with ambien 10mg   Beginning walking program    3/29/17 PET stress:  EF 51%  CONCLUSIONS: NO CLINICALLY SIGNIFICANT STRESS INDUCED PERFUSION DEFECTS as assessed with PET perfusion imaging.  1. There is no evidence of a discrete hemodynamically significant coronary stenosis.   2. Although no clinically significant stress defect is seen, there is resting flow heterogeneity that improves after Dipyridamole. These results suggest mild endothelial dysfunction due to high blood pressure and mild, diffuse, non-obstructive coronary atherosclerosis without clinically significant, localized perfusion defects.    [place] Sleepiness Scale score=1    REVIEW OF SYSTEMS: Sleep related symptoms as per HPI. Denies sinus congestion except when lying flat, or dyspnea. Occasional palpitations, muscle pain. +mood disturbance.  Otherwise a balance review of 10-systems is negative.     PHYSICAL EXAM:   BP (!) 120/90  Pulse 60  Ht 5' 4" (1.626 m)  Wt 120.3 kg (265 lb 3.4 oz)  BMI 45.52 kg/m2  GENERAL: morbid obese body habitus, well groomed     ASSESSMENT:   1. Upper airway resistance syndrome (low AHI, but significant RDI due to RERAs) There was a significantly elevated arousal index due to RERAs.  Medical " comorbidities include: morbid obesity, HTN (sub-optimal today), CVA, CAD    PLAN:   1. PSG, supine sleep. (trial apap is study + to improve sleep/HA)  2. Advised to abstain from driving should she feel sleepy or drowsy.   3. Encouraged continued weight loss efforts for potential improvement of SHIRA and overall health benefits.

## 2017-05-02 NOTE — PATIENT INSTRUCTIONS
Julissa or Lino will contact you to schedule your sleep study. Their number is [phone] (ext 1). The [facility] [place] [facility] is located on 7th floor of the [facility].    We will call you when the sleep study results are ready - if you have not heard from us by 2 weeks from the date of the study, please call [phone] (ext 2).    You are advised to abstain from driving should you feel sleepy or drowsy.

## 2017-05-02 NOTE — MR AVS SNAPSHOT
[facility]  [address]  [place] [address]  Phone: [phone]  Fax: [phone]                  Amna Chawla   2017 9:40 AM   Office Visit    Description:  Female : 1965   Provider:  Lorena Kaufman NP   Department:  [facility]rology           Diagnoses this Visit        Comments    Unspecified sleep apnea    -  Primary     UARS (upper airway resistance syndrome)                To Do List           Future Appointments        Provider Department Dept Phone    2017 8:45 AM Leeanne Aden, PharmD [facility] - Coumadin [phone]    2017 10:30 AM GORDON Villalba - [facility] [phone]    2017 10:30 AM Alexander Benjamin PTA [facility] [phone]    2017 10:15 AM Matt Pugh Jr., MD [facility] [phone]    5/15/2017 10:30 AM Alexander Benjamin PTA [facility] [phone]      Goals (5 Years of Data)     None      [facility]     [facility] Nurse Care Line -  Assistance  Unless otherwise directed by your provider, please contact [facility], our nurse care line that is available for  assistance.     Registered nurses in the [facility] provide: appointment scheduling, clinical advisement, health education, and other advisory services.  Call: [phone] (toll free)               Medications           Message regarding Medications     Verify the changes and/or additions to your medication regime listed below are the same as discussed with your clinician today.  If any of these changes or additions are incorrect, please notify your healthcare provider.             Verify that the below list of medications is an accurate representation of the medications you are currently taking.  If none reported, the list may be blank. If incorrect, please contact your healthcare provider. Carry this list with you in case of emergency.           Current  Medications     aripiprazole (ABILIFY) 5 MG Tab TAKE 1 TABLET(5 MG) BY MOUTH EVERY DAY    atorvastatin (LIPITOR) 40 MG tablet Take 1 tablet (40 mg total) by mouth every morning.    carvedilol (COREG) 25 MG tablet TAKE 1 TABLET(25 MG) BY MOUTH TWICE DAILY WITH MEALS    chlorthalidone (HYGROTEN) 25 MG Tab Take 1 tablet (25 mg total) by mouth once daily.    divalproex (DEPAKOTE) 250 MG EC tablet Take 250 mg by mouth once daily.     donepezil (ARICEPT) 10 MG tablet Take 1 tablet (10 mg total) by mouth 2 (two) times daily.    gabapentin (NEURONTIN) 300 MG capsule Take 3 capsules (900 mg total) by mouth 3 (three) times daily.    lamotrigine (LAMICTAL) 200 MG tablet Take 1 tablet (200 mg total) by mouth once daily.    lorazepam (ATIVAN) 0.5 MG tablet Take 1 tablet (0.5 mg total) by mouth every 6 (six) hours as needed for Anxiety.    magnesium 200 mg Tab Take 200 mg by mouth once daily.    ondansetron (ZOFRAN-ODT) 4 MG TbDL Take 1 tablet (4 mg total) by mouth every 24 hours as needed (nausea).    pantoprazole (PROTONIX) 40 MG tablet Take 1 tablet (40 mg total) by mouth once daily.    tizanidine (ZANAFLEX) 4 MG tablet Take 4 mg by mouth every 6 (six) hours as needed.    trazodone (DESYREL) 100 MG tablet Take 2 tablets (200 mg total) by mouth every evening.    VITAMIN D2 50,000 unit capsule TAKE 1 CAPSULE BY MOUTH EVERY 7 DAYS    warfarin (COUMADIN) 5 MG tablet Take 1.5-2 tablets (7.5-10 mg total) by mouth Daily. Current Dose ( 7.5 mg on Tue, Thu, Sat; 10 mg all other days) or as directed by coumadin clinic.    warfarin (COUMADIN) 5 MG tablet TAKE 1 1/2 TABLET BY MOUTH ON TUESDAYS, THURSDAYS AND SATURDAYS. TAKE 2 TABLETS BY MOUTH ON ALL OTHER DAYS    zolpidem (AMBIEN) 10 mg Tab Take 1 tablet (10 mg total) by mouth nightly as needed.    topiramate (TOPAMAX) 100 MG tablet Take 1.5 tablets (150 mg total) by mouth 2 (two) times daily.           Clinical Reference Information           Your Vitals Were     BP Pulse Height Weight  "BMI    120/90 60 5' 4" (1.626 m) 120.3 kg (265 lb 3.4 oz) 45.52 kg/m2      Blood Pressure          Most Recent Value    BP  (!)  120/90      Allergies as of 5/2/2017     Aspirin    Imitrex [Sumatriptan]    Nsaids (Non-steroidal Anti-inflammatory Drug)    Penicillins    Shellfish Containing Products    Percocet [Oxycodone-acetaminophen]    Reglan [Metoclopramide Hcl]      Immunizations Administered on Date of Encounter - 5/2/2017     None      Orders Placed During Today's Visit     Future Labs/Procedures Expected by Expires    Polysomnogram (CPAP will be added if patient meets diagnostic criteria.)  As directed 5/2/2018      Instructions    Julissa or Lino will contact you to schedule your sleep study. Their number is [phone] (ext 1). The [facility] is located on 7th floor of the [facility].    We will call you when the sleep study results are ready - if you have not heard from us by 2 weeks from the date of the study, please call [phone] (ext 2).    You are advised to abstain from driving should you feel sleepy or drowsy.         Language Assistance Services     ATTENTION: Language assistance services are available, free of charge. Please call [phone].      ATENCIÓN: Si kailashla adebayo, tiene a turcios disposición servicios gratuitos de asistencia lingüística. Llame al [phone].     CHÚ Ý: N?u b?n nói Ti?ng Vi?t, có các d?ch v? h? tr? ngôn ng? mi?n phí dành cho b?n. G?i s? [phone].         [facility] [place], operated by [facility] complies with applicable Federal civil rights laws and does not discriminate on the basis of race, color, national origin, age, disability, or sex.        "

## 2017-05-02 NOTE — LETTER
May 2, 2017      Missy Rock MD  [address]  [address]  [place] [address]           [facility] - [facility]  [address]  [place] [address]  Phone: [phone]  Fax: [phone]          Patient: Amna Chawla   MR Number: [id number]   YOB: 1965   Date of Visit: 5/2/2017       Dear Dr. Missy Rock:    Thank you for referring Amna Chawla to me for evaluation. Attached you will find relevant portions of my assessment and plan of care.    If you have questions, please do not hesitate to call me. I look forward to following Amna Chawla along with you.    Sincerely,    Lorena Kaufman, NP    Enclosure  CC:  No Recipients    If you would like to receive this communication electronically, please contact [email or web address] or [phone] to request more information on [employer] Link access.    For providers and/or their staff who would like to refer a patient to [facility], please contact us through our one-stop-shop provider referral line, [facility] , at [phone].    If you feel you have received this communication in error or would no longer like to receive these types of communications, please e-mail [email or web address]

## 2017-05-03 ENCOUNTER — OFFICE VISIT (OUTPATIENT)
Dept: PSYCHIATRY | Facility: CLINIC | Age: 52
End: 2017-05-03
Payer: MEDICARE

## 2017-05-03 ENCOUNTER — TELEPHONE (OUTPATIENT)
Dept: PSYCHIATRY | Facility: CLINIC | Age: 52
End: 2017-05-03

## 2017-05-03 VITALS
WEIGHT: 268 LBS | DIASTOLIC BLOOD PRESSURE: 71 MMHG | BODY MASS INDEX: 45.75 KG/M2 | HEART RATE: 79 BPM | HEIGHT: 64 IN | SYSTOLIC BLOOD PRESSURE: 139 MMHG

## 2017-05-03 DIAGNOSIS — F33.1 DEPRESSION, MAJOR, RECURRENT, MODERATE: Primary | ICD-10-CM

## 2017-05-03 DIAGNOSIS — F33.1 MAJOR DEPRESSIVE DISORDER, RECURRENT EPISODE, MODERATE: Primary | ICD-10-CM

## 2017-05-03 PROCEDURE — 90833 PSYTX W PT W E/M 30 MIN: CPT | Mod: PBBFAC | Performed by: NURSE PRACTITIONER

## 2017-05-03 PROCEDURE — 99214 OFFICE O/P EST MOD 30 MIN: CPT | Mod: S$PBB,,, | Performed by: NURSE PRACTITIONER

## 2017-05-03 PROCEDURE — 99999 PR PBB SHADOW E&M-EST. PATIENT-LVL IV: CPT | Mod: PBBFAC,,, | Performed by: NURSE PRACTITIONER

## 2017-05-03 PROCEDURE — 90833 PSYTX W PT W E/M 30 MIN: CPT | Mod: S$PBB,,, | Performed by: NURSE PRACTITIONER

## 2017-05-03 PROCEDURE — 99214 OFFICE O/P EST MOD 30 MIN: CPT | Mod: PBBFAC | Performed by: NURSE PRACTITIONER

## 2017-05-03 RX ORDER — ARIPIPRAZOLE 10 MG/1
10 TABLET ORAL DAILY
Qty: 90 TABLET | Refills: 3 | Status: SHIPPED | OUTPATIENT
Start: 2017-05-03 | End: 2017-06-01

## 2017-05-03 RX ORDER — ARIPIPRAZOLE 5 MG/1
TABLET ORAL
Qty: 90 TABLET | Refills: 3 | Status: SHIPPED | OUTPATIENT
Start: 2017-05-03 | End: 2017-05-03 | Stop reason: DRUGHIGH

## 2017-05-03 NOTE — PROGRESS NOTES
"Outpatient Psychiatry Follow-Up Visit (MD/NP)    5/3/2017    Clinical Status of Patient:  Outpatient (Ambulatory)    Chief Complaint:  Amna Chawla is a 51 y.o. female who presents today for follow-up of depression and anxiety.  Met with patient.      Interval History and Content of Current Session:  Interim Events/Subjective Report/Content of Current Session:     Last seen Jan 2016, chart reviewed, mult medical issues, no communication between appts    Abilify 5mg po q day  Cymbalta 60mg po bid  Ambien 10mg po q hs    Ativan 0.5mg po q 6 hours from Dr. Fragoso.  Also taking Neurontin, Depakote, Lamictal.   Gabatrol PRN for migraines    Noncompliant with meds, patient state she had some "real down days" so she took more Cymbalta.  Stressed compliance.  Made an appt with individual therapist 5/23/2017, states she had another light stroke the beginning of March, please see notes from Dr. Avelino Mjeia,  Was re-evaluated by Dr. Gigi Rivas for Neuro Psych Testing.  Currently going to speech therapy, PT, will be going to OT in the near future.  Patient wants to move into low income housing for elderly and the disabled.  Can bring youngest daughter, but unable to bring older daughter.     Ambien with good results, sleeping steady, wakes refreshed, not groggy.  Denies any amnesia or memory changes with Ambien.  Patient reports dysthymic mood, good and bad days usually equal, but having more bad days.  Reports crying spells but unable to identify trigger.  Negative attitude    Psychotherapy:  · Target symptoms: depression, anxiety   · Why chosen therapy is appropriate versus another modality: relevant to diagnosis  · Outcome monitoring methods: self-report  · Therapeutic intervention type: insight oriented psychotherapy  · Topics discussed/themes: symptom recognition, financial stressors  · The patient's response to the intervention is reluctant. The patient's progress toward treatment goals is not progressing.   · Duration " "of intervention: 16 minutes.    Review of Systems   GENERAL: No weight gain or loss   SKIN: No rashes or lacerations   HEAD: No headaches   EYES: No exophthalmos, jaundice or blindness   EARS: No dizziness, tinnitus or hearing loss   NOSE: No changes in smell   MOUTH & THROAT: No dyskinetic movements or obvious goiter   CHEST: No shortness of breath, hyperventilation or cough   CARDIOVASCULAR: No tachycardia or chest pain   ABDOMEN: No nausea, vomiting, pain, constipation or diarrhea   URINARY: No frequency, dysuria or sexual dysfunction   ENDOCRINE: No polydipsia, polyuria   MUSCULOSKELETAL: as above, walks with cane  NEUROLOGIC: + weakness, no sensory changes, seizures, confusion, memory loss, tremor or other abnormal movements      Psychiatric Review Of Systems:  sleep: yes  appetite changes: no  weight changes: no  energy/anergy: no  interest/pleasure/anhedonia: no  somatic symptoms: yes  libido: no  anxiety/panic: yes      Past Medical, Family and Social History: The patient's past medical, family and social history have been reviewed and updated as appropriate within the electronic medical record - see encounter notes.    Compliance: no    Side effects: None    Risk Parameters:  Patient reports no suicidal ideation  Patient reports no homicidal ideation  Patient reports no self-injurious behavior  Patient reports no violent behavior    Exam (detailed: at least 9 elements; comprehensive: all 15 elements)   Constitutional  Vitals:  Most recent vital signs, dated less than 90 days prior to this appointment, were reviewed.   Vitals:    05/03/17 0915   BP: 139/71   Pulse: 79   Weight: 121.6 kg (268 lb)   Height: 5' 4" (1.626 m)        General:  unremarkable, age appropriate, casually dressed, neatly groomed     Musculoskeletal  Muscle Strength/Tone:  no dyskinesia, no dystonia, no tremor, no tic   Gait & Station:  slow, uses cane     Psychiatric  Speech:  soft   Mood & Affect:  anxious, depressed, irritable, " sad  blunted   Thought Process:  normal and logical   Associations:  intact   Thought Content:  normal, no suicidality, no homicidality, delusions, or paranoia   Insight:  has awareness of illness   Judgement: behavior is adequate to circumstances   Orientation:  grossly intact   Memory: intact for content of interview   Language: grossly intact   Attention Span & Concentration:  able to focus   Fund of Knowledge:  intact and appropriate to age and level of education     Assessment and Diagnosis   Status/Progress: Based on the examination today, the patient's problem(s) is/are failing to respond as expected to treatment.  New problems have not been presented today.   Co-morbidities and Lack of compliance are complicating management of the primary condition.  There are no active rule-out diagnoses for this patient at this time.     General Impression:       ICD-10-CM ICD-9-CM   1. Depression, major, recurrent, moderate F33.1 296.32     R/O Personality Disorder    Intervention/Counseling/Treatment Plan   · Medication Management: The risks and benefits of medication were discussed with the patient.   · Cymbalta 60mg 2 caps po q day  · Abilify 5mg po q d  · Ambien 10mg po q hs  · Will email providers concerning possible increase in Abilify      Return to Clinic: 2 months

## 2017-05-03 NOTE — TELEPHONE ENCOUNTER
Discussed increase in Abilify with patient since other providers agree, please see medical record.    Patient agreed.  Increase Abilify 10mg po q day, can take 2 of the 5mg Abilify until gone.    Patient very pleasant, agrees with treatment

## 2017-05-03 NOTE — PATIENT INSTRUCTIONS
[facility] - DEPARTMENT OF PSYCHIATRY   PATIENT INFORMATION    We appreciate the opportunity to participate in your medical care and hope the following protocols will make it easier for you to receive quality treatment in our department.    1. PUNCTUALITY: Your appointment is scheduled for a fixed amount of time.  To get the benefit of your appointment, please arrive at least 15 minutes early enough to allow time for traffic, parking and registration.  If you are late for your appointment, you may have to reschedule.  Please make every effort to be on time.      2. PAYMENT FOR SERVICES:   Payments are expected at the time of service.  Please contact [phone] if you need to resolve issues involving your account at [facility] or to set up a payment plan.    3. CANCELLATION / MISSED APPOINTMENTS:   In order to receive quality care, all appointments must be kept.  Appointment may be cancelled at least 24 hours before your appointment time. If you do not give at least 24-hour notice of cancellation a fee may be billed directly to the patient.  Please note that insurance does not cover no-show charges, so you will be billed directly.  If you are consistently late, cancel, or do not show for your appointments, our department reserves the right to terminate treatment     MESSAGES- In general you can reach the department by calling [phone], between 8:00 a.m. and 5:00 p.m., Monday through Friday.  You can also use the [facility] Patient Portal.     AFTER HOURS, WEEKEND OR HOLIDAYS- For urgent questions after hours, weekends and holidays, calling the department number [phone] will connect you with a representative.  EMERGENCY-  In case of a crisis when there is a concern of harm to self or others, call 911 or the office [phone] between 8:00 a.m. and 5:00 p.m., Monday through Friday or go to the [facility] Emergency Room.    4. PRESCRIPTION REFILLS:  Prescription refills must be done at your  physician office visit, You will be given a sufficient number of refills to last until your next appointment, you must come to appointments for prescriptions.       5. FOLLOW UP APPOINTMENTS:  Follow-up appointments can be made in person at the Psychiatry Appointment Desk, online through the [facility] Patient Portal, or by calling [phone], from 8am to 5pm, between Monday and Friday.  Patients are responsible for scheduling their own follow-up appointment,  It  Is recommended you schedule your appointment before leaving the clinic.    6. Providers are NOT ABLE to schedule appointments; all appointments must be made through the appointment department or through [facility] Patient Portal.     Cancellation Policy:  Please provide greater than 24 hour notice of any cancellations as a fee will be charged for failure to do so. This policy is in effect to allow for other individuals on a long waiting list to be seen as soon as possible. Unlike other branches of medicine where several individuals can be scheduled in a 15 minute time slot, only one individual can be scheduled in any time slot in Psychiatry.    Walk-in appointments:      Walk in appointments are not available. For emergencies, please go to the Emergency Room.    [facility]: Please enroll as this is the preferred method of contacting your doctor for non-emergent calls. It will also allow you to book your own follow up appointments.    No Shows: Repeated no shows may result in a warning letter or you may be asked to seek care elsewhere.    Phone Calls: Please discuss concerns with your doctor within your scheduled appointment time. In most cases Psychotherapy and Medication management are not appropriate by telephone. If you have a simple question/concern, please provide all of the following information to the :   Detailed description of concern   Pharmacy name and phone number   Date of last visit and next scheduled visit   Phone number  where you can be reached throughout the day   Indication as to whether leaving a voice mail or message on an answering machine is appropriate (if applicable)  Calls will be returned by the Medical Assistant or Office Staff after your doctor has reviewed the message.  Please allow 24 hours for a returned phone call before calling back to leave another message. (If numerous calls are made between appointments, or if calls end up being lengthy, more frequent appointments are required for proper management.)    Disability: We do not do disability evaluations.  Please contact Social Security Administration for evaluations and determinations. You will then sign releases allowing for records from this office to be forwarded to Social Security Administration to use in their evaluation.    Gun Permit: We do not offer Sound Judgment Evaluations or assessments leading to gun ownership, nor do we fill out or file paperwork relevant to owning, concealing or purchasing a firearm.        Emotional Support     Animals: ESAs are not trained to perform tasks or recognize particular signs or symptoms but are distinguished by the close, emotional, and supportive bond between the animal and the owner; therefore we do not provide documentation, including letters, to aid in the acclamation that an Emotional Support Animal is required.      Samples: We do not provide samples of any medications. If you have financial difficulties and are on a limited income, you may qualify for Patient Assistance Programs from various pharmaceutical companies. This will require that you complete paperwork with your financial information, but this does not guarantee that the company will approve the application. Alternative medication options can be discussed.    Controlled Medications: Some medications are tightly controlled and regulated by the FDA and YE.  Controlled medications will not be refilled before 30 days. Some of these medications will not  be refilled without a face to face appointment. For some individuals, monthly appointments may have to be scheduled.    Forms: If you have any forms or letters that need to be completed by your doctor, please present these at the beginning of the appointment. Forms, letters, etc. will not be completed outside of appointment times. Please provide a full name and address of the recipient. Anonymous letters will not be dictated. We do not have disability forms, FMLA forms, etc. on file.  You will need to obtain the necessary paperwork from your employer/school.    Limitations: You will be referred to other providers if we feel unable to adequately diagnose or treat your particular condition, or if collaboration with another provider would allow for better management of your condition.    Revised 4/12/2017    PATIENTS MAY EXPERIENCE SYMPTOMS OF WITHDRAWAL IF THEY RUN OUT OF MEDICATIONS.  THE PATIENT WILL ASSUME ALL RESPONSIBILITIES OF ANY OUTCOMES WHEN THERE IS AN ISSUE WITH NONCOMPLIANCE WITH FOLLOW-UP APPOINTMENTS AND MEDICATIONS.        THERE IS TO BE NO USE OF ALCOHOL AND/OR ILLEGAL SUBSTANCES      PATIENT ARE TO GO TO THE CLOSEST EMERGENCY ROOM IF FEELING A THREAT TO THEMSELVES OR OTHER OR IF GRAVELY DISABLED    TELL US HOW WE ARE DOING.  PLEASE COMPLETE THE PATIENT SATISFACTION SURVERY

## 2017-05-03 NOTE — TELEPHONE ENCOUNTER
----- Message from Avelino Mejia MD sent at 5/3/2017  1:05 PM CDT -----  From a heart rhythm standpoint I have no problem with the medication change.    Hope she feels better.    Regards    Avelino  ----- Message -----     From: Juan Carlos Childers NP     Sent: 5/3/2017  12:52 PM       To: Avelino Mejia MD, Richmond Toney MD, #    Good Afternoon,    I saw this patient in my office this morning.  She reports an increase in her depression.  Patient was crying during the appt.  I was considering increasing her Abilify from 5mg to 10mg po q day, but would like some input from her other providers.    Thank you in advance.    HANK Sands

## 2017-05-03 NOTE — TELEPHONE ENCOUNTER
----- Message from Missy Rock MD sent at 5/3/2017  1:11 PM CDT -----  That's fine from my perspective as well.      ----- Message -----     From: Juan Carlos Childers NP     Sent: 5/3/2017  12:52 PM       To: Avelino Mejia MD, Richmond Toney MD, #    Good Afternoon,    I saw this patient in my office this morning.  She reports an increase in her depression.  Patient was crying during the appt.  I was considering increasing her Abilify from 5mg to 10mg po q day, but would like some input from her other providers.    Thank you in advance.    HANK Sands

## 2017-05-03 NOTE — MR AVS SNAPSHOT
[facility] - Psychiatry  [address]  [facility] [address]  Phone: [phone]  Fax: [phone]                  Amna Chawla   5/3/2017 9:00 AM   Office Visit    Description:  Female : 1965   Provider:  Juan Carlos Childers NP   Department:  [facility] - Psychiatry           Reason for Visit     Mood     Depression     Anxiety           Diagnoses this Visit        Comments    Depression, major, recurrent, moderate    -  Primary            To Do List           Future Appointments        Provider Department Dept Phone    2017 8:45 AM Leeanne Aden, PharmD [facility] - Coumadin [phone]    2017 10:30 AM Charlene Rodriguez PA-C [facility] - [facility] [phone]    2017 10:30 AM Alexander Benjamin PTA [facility] [phone]    2017 10:15 AM Matt Pugh Jr., MD [facility] [phone]    5/15/2017 10:30 AM Alexander Benjamin PTA [facility] [phone]      Goals (5 Years of Data)     None      Follow-Up and Disposition     Return in about 8 weeks (around 2017).       These Medications        Disp Refills Start End    aripiprazole (ABILIFY) 5 MG Tab 90 tablet 3 5/3/2017     TAKE 1 TABLET(5 MG) BY MOUTH EVERY DAY    Pharmacy: [id number] - [place], [place] - [address] AT [address] Ph #: [phone]       Notes to Pharmacy: **Patient requests 90 days supply**      [facility]     [facility] Nurse Care Line -  Assistance  Unless otherwise directed by your provider, please contact [facility], our nurse care line that is available for  assistance.     Registered nurses in the [facility] Center provide: appointment scheduling, clinical advisement, health education, and other advisory services.  Call: [phone] (toll free)               Medications           Message regarding Medications     Verify the changes and/or additions to  your medication regime listed below are the same as discussed with your clinician today.  If any of these changes or additions are incorrect, please notify your healthcare provider.             Verify that the below list of medications is an accurate representation of the medications you are currently taking.  If none reported, the list may be blank. If incorrect, please contact your healthcare provider. Carry this list with you in case of emergency.           Current Medications     aripiprazole (ABILIFY) 5 MG Tab TAKE 1 TABLET(5 MG) BY MOUTH EVERY DAY    atorvastatin (LIPITOR) 40 MG tablet Take 1 tablet (40 mg total) by mouth every morning.    carvedilol (COREG) 25 MG tablet TAKE 1 TABLET(25 MG) BY MOUTH TWICE DAILY WITH MEALS    chlorthalidone (HYGROTEN) 25 MG Tab Take 1 tablet (25 mg total) by mouth once daily.    divalproex (DEPAKOTE) 250 MG EC tablet Take 250 mg by mouth once daily.     donepezil (ARICEPT) 10 MG tablet Take 1 tablet (10 mg total) by mouth 2 (two) times daily.    gabapentin (NEURONTIN) 300 MG capsule Take 3 capsules (900 mg total) by mouth 3 (three) times daily.    lamotrigine (LAMICTAL) 200 MG tablet Take 1 tablet (200 mg total) by mouth once daily.    lorazepam (ATIVAN) 0.5 MG tablet Take 1 tablet (0.5 mg total) by mouth every 6 (six) hours as needed for Anxiety.    magnesium 200 mg Tab Take 200 mg by mouth once daily.    ondansetron (ZOFRAN-ODT) 4 MG TbDL Take 1 tablet (4 mg total) by mouth every 24 hours as needed (nausea).    pantoprazole (PROTONIX) 40 MG tablet Take 1 tablet (40 mg total) by mouth once daily.    tizanidine (ZANAFLEX) 4 MG tablet Take 4 mg by mouth every 6 (six) hours as needed.    topiramate (TOPAMAX) 100 MG tablet Take 1.5 tablets (150 mg total) by mouth 2 (two) times daily.    trazodone (DESYREL) 100 MG tablet Take 2 tablets (200 mg total) by mouth every evening.    VITAMIN D2 50,000 unit capsule TAKE 1 CAPSULE BY MOUTH EVERY 7 DAYS    warfarin (COUMADIN) 5 MG tablet  "Take 1.5-2 tablets (7.5-10 mg total) by mouth Daily. Current Dose ( 7.5 mg on Tue, Thu, Sat; 10 mg all other days) or as directed by coumadin clinic.    warfarin (COUMADIN) 5 MG tablet TAKE 1 1/2 TABLET BY MOUTH ON TUESDAYS, THURSDAYS AND SATURDAYS. TAKE 2 TABLETS BY MOUTH ON ALL OTHER DAYS    zolpidem (AMBIEN) 10 mg Tab Take 1 tablet (10 mg total) by mouth nightly as needed.           Clinical Reference Information           Your Vitals Were     BP Pulse Height Weight BMI    139/71 79 5' 4" (1.626 m) 121.6 kg (268 lb) 46 kg/m2      Blood Pressure          Most Recent Value    BP  139/71      Allergies as of 5/3/2017     Aspirin    Imitrex [Sumatriptan]    Nsaids (Non-steroidal Anti-inflammatory Drug)    Penicillins    Shellfish Containing Products    Percocet [Oxycodone-acetaminophen]    Reglan [Metoclopramide Hcl]      Immunizations Administered on Date of Encounter - 5/3/2017     None      Instructions    [facility] - DEPARTMENT OF PSYCHIATRY   PATIENT INFORMATION    We appreciate the opportunity to participate in your medical care and hope the following protocols will make it easier for you to receive quality treatment in our department.    1. PUNCTUALITY: Your appointment is scheduled for a fixed amount of time.  To get the benefit of your appointment, please arrive at least 15 minutes early enough to allow time for traffic, parking and registration.  If you are late for your appointment, you may have to reschedule.  Please make every effort to be on time.      2. PAYMENT FOR SERVICES:   Payments are expected at the time of service.  Please contact [phone] if you need to resolve issues involving your account at [facility] or to set up a payment plan.    3. CANCELLATION / MISSED APPOINTMENTS:   In order to receive quality care, all appointments must be kept.  Appointment may be cancelled at least 24 hours before your appointment time. If you do not give at least 24-hour notice of cancellation a " fee may be billed directly to the patient.  Please note that insurance does not cover no-show charges, so you will be billed directly.  If you are consistently late, cancel, or do not show for your appointments, our department reserves the right to terminate treatment     MESSAGES- In general you can reach the department by calling [phone], between 8:00 a.m. and 5:00 p.m., Monday through Friday.  You can also use the [facility] Patient Portal.     AFTER HOURS, WEEKEND OR HOLIDAYS- For urgent questions after hours, weekends and holidays, calling the department number [phone] will connect you with a representative.  EMERGENCY-  In case of a crisis when there is a concern of harm to self or others, call 911 or the office [phone] between 8:00 a.m. and 5:00 p.m., Monday through Friday or go to the [facility] Emergency Room.    4. PRESCRIPTION REFILLS:  Prescription refills must be done at your physician office visit, You will be given a sufficient number of refills to last until your next appointment, you must come to appointments for prescriptions.       5. FOLLOW UP APPOINTMENTS:  Follow-up appointments can be made in person at the Psychiatry Appointment Desk, online through the [facility] Patient Portal, or by calling [phone], from 8am to 5pm, between Monday and Friday.  Patients are responsible for scheduling their own follow-up appointment,  It  Is recommended you schedule your appointment before leaving the clinic.    6. Providers are NOT ABLE to schedule appointments; all appointments must be made through the appointment department or through [facility] Portal.     Cancellation Policy:  Please provide greater than 24 hour notice of any cancellations as a fee will be charged for failure to do so. This policy is in effect to allow for other individuals on a long waiting list to be seen as soon as possible. Unlike other branches of medicine where several individuals can be scheduled in a 15  minute time slot, only one individual can be scheduled in any time slot in Psychiatry.    Walk-in appointments:      Walk in appointments are not available. For emergencies, please go to the Emergency Room.    [facility]: Please enroll as this is the preferred method of contacting your doctor for non-emergent calls. It will also allow you to book your own follow up appointments.    No Shows: Repeated no shows may result in a warning letter or you may be asked to seek care elsewhere.    Phone Calls: Please discuss concerns with your doctor within your scheduled appointment time. In most cases Psychotherapy and Medication management are not appropriate by telephone. If you have a simple question/concern, please provide all of the following information to the :   Detailed description of concern   Pharmacy name and phone number   Date of last visit and next scheduled visit   Phone number where you can be reached throughout the day   Indication as to whether leaving a voice mail or message on an answering machine is appropriate (if applicable)  Calls will be returned by the Medical Assistant or Office Staff after your doctor has reviewed the message.  Please allow 24 hours for a returned phone call before calling back to leave another message. (If numerous calls are made between appointments, or if calls end up being lengthy, more frequent appointments are required for proper management.)    Disability: We do not do disability evaluations.  Please contact Social Security Administration for evaluations and determinations. You will then sign releases allowing for records from this office to be forwarded to Social Security Administration to use in their evaluation.    Gun Permit: We do not offer Sound Judgment Evaluations or assessments leading to gun ownership, nor do we fill out or file paperwork relevant to owning, concealing or purchasing a firearm.        Emotional Support     Animals: ESAs are not  trained to perform tasks or recognize particular signs or symptoms but are distinguished by the close, emotional, and supportive bond between the animal and the owner; therefore we do not provide documentation, including letters, to aid in the acclamation that an Emotional Support Animal is required.      Samples: We do not provide samples of any medications. If you have financial difficulties and are on a limited income, you may qualify for Patient Assistance Programs from various pharmaceutical companies. This will require that you complete paperwork with your financial information, but this does not guarantee that the company will approve the application. Alternative medication options can be discussed.    Controlled Medications: Some medications are tightly controlled and regulated by the FDA and YE.  Controlled medications will not be refilled before 30 days. Some of these medications will not be refilled without a face to face appointment. For some individuals, monthly appointments may have to be scheduled.    Forms: If you have any forms or letters that need to be completed by your doctor, please present these at the beginning of the appointment. Forms, letters, etc. will not be completed outside of appointment times. Please provide a full name and address of the recipient. Anonymous letters will not be dictated. We do not have disability forms, FMLA forms, etc. on file.  You will need to obtain the necessary paperwork from your employer/school.    Limitations: You will be referred to other providers if we feel unable to adequately diagnose or treat your particular condition, or if collaboration with another provider would allow for better management of your condition.    Revised 4/12/2017    PATIENTS MAY EXPERIENCE SYMPTOMS OF WITHDRAWAL IF THEY RUN OUT OF MEDICATIONS.  THE PATIENT WILL ASSUME ALL RESPONSIBILITIES OF ANY OUTCOMES WHEN THERE IS AN ISSUE WITH NONCOMPLIANCE WITH FOLLOW-UP APPOINTMENTS AND  MEDICATIONS.        THERE IS TO BE NO USE OF ALCOHOL AND/OR ILLEGAL SUBSTANCES      PATIENT ARE TO GO TO THE CLOSEST EMERGENCY ROOM IF FEELING A THREAT TO THEMSELVES OR OTHER OR IF GRAVELY DISABLED    TELL US HOW WE ARE DOING.  PLEASE COMPLETE THE PATIENT SATISFACTION SURVERY           Language Assistance Services     ATTENTION: Language assistance services are available, free of charge. Please call [phone].      ATENCIÓN: Si habla español, tiene a turcios disposición servicios gratuitos de asistencia lingüística. Llame al [phone].     REUBEN Ý: N?u b?n nói Ti?ng Vi?t, có các d?ch v? h? tr? ngôn ng? mi?n phí dành cho b?n. G?i s? [phone].         [facility] complies with applicable Federal civil rights laws and does not discriminate on the basis of race, color, national origin, age, disability, or sex.

## 2017-05-03 NOTE — TELEPHONE ENCOUNTER
----- Message from Richmond Toney MD sent at 5/3/2017  2:19 PM CDT -----    No problem from the general cardiology perspective.    ----- Message -----     From: Juan Carlos Childers NP     Sent: 5/3/2017  12:52 PM       To: Avelino Mejia MD, Richmond Toney MD, #    Good Afternoon,    I saw this patient in my office this morning.  She reports an increase in her depression.  Patient was crying during the appt.  I was considering increasing her Abilify from 5mg to 10mg po q day, but would like some input from her other providers.    Thank you in advance.    HANK Sands

## 2017-05-04 ENCOUNTER — ANTI-COAG VISIT (OUTPATIENT)
Dept: CARDIOLOGY | Facility: CLINIC | Age: 52
End: 2017-05-04
Payer: MEDICARE

## 2017-05-04 DIAGNOSIS — Z79.01 LONG TERM (CURRENT) USE OF ANTICOAGULANTS: Primary | ICD-10-CM

## 2017-05-04 DIAGNOSIS — I48.0 PAROXYSMAL ATRIAL FIBRILLATION: ICD-10-CM

## 2017-05-04 LAB
CTP QC/QA: YES
INR PPP: 2.9 (ref 2–3)
PROTHROMBIN TIME, POC: NORMAL (ref 2–3)

## 2017-05-04 PROCEDURE — 99999 PR PBB SHADOW E&M-EST. PATIENT-LVL I: CPT | Mod: PBBFAC,,, | Performed by: PHARMACIST

## 2017-05-04 PROCEDURE — 85610 PROTHROMBIN TIME: CPT | Mod: PBBFAC | Performed by: PHARMACIST

## 2017-05-04 PROCEDURE — 99211 OFF/OP EST MAY X REQ PHY/QHP: CPT | Mod: PBBFAC | Performed by: PHARMACIST

## 2017-05-04 NOTE — PROGRESS NOTES
INR is therapeutic today. At higher end of range but has not yet had her serving of greens this week. Patient denies changes to diet, medications, or health that would affect INR.  She have her 1 serving of greens for the week and will continue weekly warfarin regimen at this time.  Patient advised to contact clinic with any changes, questions, or concerns.

## 2017-05-05 ENCOUNTER — TELEPHONE (OUTPATIENT)
Dept: ORTHOPEDICS | Facility: CLINIC | Age: 52
End: 2017-05-05

## 2017-05-05 NOTE — TELEPHONE ENCOUNTER
----- Message from Mai Gutierrez sent at 5/5/2017  9:20 AM CDT -----  Contact: self@ home   Pt would like to speak with katelyn about getting some pain medication for her back pain until Monday.

## 2017-05-05 NOTE — TELEPHONE ENCOUNTER
Spoke with pt to inform that she can not have medication unless she is seen in office. Pt verbally confirmed understanding.

## 2017-05-08 ENCOUNTER — CLINICAL SUPPORT (OUTPATIENT)
Dept: REHABILITATION | Facility: HOSPITAL | Age: 52
End: 2017-05-08
Attending: PSYCHIATRY & NEUROLOGY
Payer: MEDICARE

## 2017-05-08 DIAGNOSIS — F06.8 COGNITIVE DEFICIT AS LATE EFFECT OF TRAUMATIC BRAIN INJURY: ICD-10-CM

## 2017-05-08 DIAGNOSIS — S06.9X0S COGNITIVE DEFICIT AS LATE EFFECT OF TRAUMATIC BRAIN INJURY: ICD-10-CM

## 2017-05-08 DIAGNOSIS — Z79.01 LONG TERM (CURRENT) USE OF ANTICOAGULANTS: ICD-10-CM

## 2017-05-08 DIAGNOSIS — Z74.09 IMPAIRED MOBILITY AND ADLS: ICD-10-CM

## 2017-05-08 DIAGNOSIS — Z78.9 IMPAIRED MOBILITY AND ADLS: ICD-10-CM

## 2017-05-08 DIAGNOSIS — I48.0 PAROXYSMAL ATRIAL FIBRILLATION: ICD-10-CM

## 2017-05-08 PROCEDURE — 97110 THERAPEUTIC EXERCISES: CPT | Mod: PO

## 2017-05-08 NOTE — PROGRESS NOTES
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 Pt reports compliance with HEP. Reports neck pain abolished.  Pain Scale: 10/10 LBP. ; 6/10 following interventions.    Objective     Patient received individual therapy to increase ROM, flexibility, posture and decrease pain with activities as follows:     Pt participated in therapeutic exercises x 45 minutes:     PROM cervical rotation, upper trap stretch, levator scap, pecs, left shoulder ER (normal), left shoulder flexion (normal), left shoulder abd (normal)    Supine: Serratus: 2 x 15 2#               Bruegger's: 2 x 15 OTB               Horiz Abd: 2 x 15 YTB    side lying:  UE horizontal abd 1# 2 x 10    UE vertical abd 1# 2 x 10    Sitting: BUE ER with OTB 2 x 10    Standing: shoulder extension in stance GTB 2 x 10   Serratus stars YTB 2 x 5 B    Pt received manual therapy treatment x 10 minutes to address  pain and posture:   inhibitive distraction NP      Written Home Exercises: 4/18/17- issued OTB for serratus stars, GTB for lat pulls and rows, chin tucks. Reviewed SKC, Nerve glides, HSS, Piriformis stretch to address LBP and radicular c/o.  Pt demo good understanding of the education provided. Amna demonstrated good return demonstration of activities.     Education provided re: POC, HEP    Pt has no cultural, educational or language barriers to learning provided.    Assessment   Amna tolerated treatment well. Chief c/o  Left LBP and Left radicular symptoms so reviewed HEP to address. Decreased pain and symptoms following interventions.  Progressed with postural exercises and justin scapular strengthening.   Pt can benefit from continued skilled PT to address impairments to improve posture, increase functional use of LUE, and decrease headaches to improve independence and quality of life.     Pt prognosis is Good. Pt will continue to benefit from skilled outpatient physical therapy to address the deficits listed in the problem list, provide pt/family education and to maximize pt's level of independence in the home and community environment.     Anticipated barriers to physical therapy: history of CHI and CVA since initial evaluation.     Medical necessity is demonstrated by the following IMPAIRMENTS/PROMBLEM LIST:   1. Fall Risk - impaired balance   2. Weakness   3. Impaired muscle tone  4. Decreased ROM  5. Gait deviations   6. Impaired ambulation   7. Decreased activity tolerance   8. Difficulty participating in daily activities   9. Continued inability to participate in vocational pursuits   10. Requires skilled supervision to complete and progress HEP       Pt's spiritual, cultural and educational needs considered and pt agreeable to plan of care and GOALS as stated below:   GOALS:   Goals appropriate as of 4/12/17:   Short term goals: 6 weeks, pt agrees to goals set.  11. Pt will perform HEP for UE/ scapular  strengthening and cervical ROM/ stability with supervision to improve carryover of progress and decrease pain.  12. Pt will demonstrate improved cervical flex/ ext ROM by 10 degrees to demonstrate improved ROM to improve posture.  13. Pt will demonstrate improved cervical rotation to 50 degrees to decrease pain.   14. Pt will demonstrate improved bilateral shoulder AROM for flexion to 140 degrees to be WFLs.  15. Pt will report decreased left shoulder pain to 5/10 with AROM to improve mobility and demonstrate decreased pain.  16. Pt will demonstrate improved balance by scoring 19/30 on Functional Gait Assessment.      Long term goals: 12 weeks, pt agrees to goals set  8. Pt will report decreased HA to 3x/ week or less with no ER visits due to pain x 4 weeks.  9. Pt will demonstrate improved cervical flex/ ext AROM to 50 degrees to demonstrate functional ROM and to decrease pain.  10. Pt will demonstrate improved cervical rotation to 65 degrees to demonstrate improved mobility and decrease HA.  11. Pt will demonstrate bilateral shoulder AROM for abd to 130 degrees to be WFLs.   12. Pt will score 23/30 on Functional Gait Assessment to demonstrate improved balance and decreased fall risk.   13. Pt will demonstrate gait velocity WFLs for diagnosis by ambulating 1.24 m/s.  14. Pt will demonstrate improved balance and decreased fall risk by scoring 22/30 on FGA.      Plan   Continue PT 1-2x weekly under established Plan of Care, with treatment to include: pt education, HEP, therapeutic exercises, neuromuscular re-education/balance exercises, therapeutic activities, joint mobilizations, manual therapy, and modalities PRN, to work towards established goals. Pt may be seen by PTA to carry out plan of care.     Alexander Benjamin, PTA   05/08/2017

## 2017-05-09 ENCOUNTER — OFFICE VISIT (OUTPATIENT)
Dept: ORTHOPEDICS | Facility: CLINIC | Age: 52
End: 2017-05-09
Payer: MEDICARE

## 2017-05-09 ENCOUNTER — TELEPHONE (OUTPATIENT)
Dept: SLEEP MEDICINE | Facility: OTHER | Age: 52
End: 2017-05-09

## 2017-05-09 VITALS — BODY MASS INDEX: 45.64 KG/M2 | WEIGHT: 267.31 LBS | HEIGHT: 64 IN

## 2017-05-09 DIAGNOSIS — M43.10 SPONDYLOLISTHESIS, UNSPECIFIED SPINAL REGION: Primary | ICD-10-CM

## 2017-05-09 PROCEDURE — 99999 PR PBB SHADOW E&M-EST. PATIENT-LVL III: CPT | Mod: PBBFAC,,, | Performed by: PHYSICIAN ASSISTANT

## 2017-05-09 PROCEDURE — 99213 OFFICE O/P EST LOW 20 MIN: CPT | Mod: PBBFAC | Performed by: PHYSICIAN ASSISTANT

## 2017-05-09 PROCEDURE — 99213 OFFICE O/P EST LOW 20 MIN: CPT | Mod: S$PBB,,, | Performed by: PHYSICIAN ASSISTANT

## 2017-05-09 RX ORDER — METHYLPREDNISOLONE 4 MG/1
TABLET ORAL
COMMUNITY
Start: 2017-03-07 | End: 2017-05-09

## 2017-05-09 RX ORDER — METHYLPREDNISOLONE 4 MG/1
TABLET ORAL
Qty: 1 PACKAGE | Refills: 0 | Status: SHIPPED | OUTPATIENT
Start: 2017-05-09 | End: 2017-06-01 | Stop reason: ALTCHOICE

## 2017-05-09 RX ORDER — AZITHROMYCIN 250 MG/1
TABLET, FILM COATED ORAL
COMMUNITY
Start: 2017-03-07 | End: 2017-06-01 | Stop reason: ALTCHOICE

## 2017-05-09 NOTE — PROGRESS NOTES
Patient called to report that today-5/09 she will be starting a tapering dose of Prednisone-10mg (6 x 1 d, 5 x 1d, 4 x 1d, 3 x 1d, 2 x 1d, 1 x 1d), next INR is due 5/18, Patient can be reached at # [phone] with any coumadin changes

## 2017-05-09 NOTE — PROGRESS NOTES
"DATE: 5/9/2017  PATIENT: Amna Chawla    Attending Physician: Avelino Lea M.D.    HISTORY:  Amna Chawla is a 51 y.o. female who returns to me today for follow up of low back pain.  She was last seen by me 11/21/2016.  Today she is doing well but notes she has pain in her left posterolateral leg which began on Thursday.  She denies numbness or tingling.  Denies weakness.    The Patient denies myelopathic symptoms such as handwriting changes or difficulty with buttons/coins/keys. Denies perineal paresthesias, bowel/bladder dysfunction.    PMH/PSH/FamHx/SocHx:  Unchanged from prior visit    ROS:  REVIEW OF SYSTEMS:  Constitution: Negative. Negative for chills, fever and night sweats.   HENT: Negative for congestion and headaches.    Eyes: Negative for blurred vision, left vision loss and right vision loss.   Cardiovascular: Negative for chest pain and syncope.   Respiratory: Negative for cough and shortness of breath.    Endocrine: Negative for polydipsia, polyphagia and polyuria.   Hematologic/Lymphatic: Negative for bleeding problem. Does not bruise/bleed easily.   Skin: Negative for dry skin, itching and rash.   Musculoskeletal: Negative for falls and muscle weakness.   Gastrointestinal: Negative for abdominal pain and bowel incontinence.   Allergic/Immunologic: Negative for hives and persistent infections.  Genitourinary: Negative for urinary retention/incontinence and nocturia.   Neurological: Negative for disturbances in coordination, no myelopathic symptoms such as handwriting changes or difficulty with buttons, coins, keys or small objects. No loss of balance and seizures.   Psychiatric/Behavioral: Negative for depression. The patient does not have insomnia.   Denies perineal paresthesias, bowel or bladder incontinence    EXAM:  Ht 5' 4" (1.626 m)  Wt 121.2 kg (267 lb 4.9 oz)  BMI 45.88 kg/m2    My physical examination was notable for the following findings:     Normal station and gait, no " difficulty with toe or heel walk.   Dorsal lumbar skin negative for rashes, lesions, hairy patches and surgical scars. There is mild lumbar tenderness to palpation.  Lumbar range of motion is acceptable.  Global saggital and coronal spinal balance acceptable, not significant for scoliosis and kyphosis.  No pain with the range of motion of the bilateral hips. No trochanteric tenderness to palpation.  Bilateral lower extremities warm and well perfused, dorsalis pedis pulses 2+ bilaterally.  Normal strength and tone in all major motor groups in the bilateral lower extremities. Normal sensation to light touch in the L2-S1 dermatomes bilaterally.  Deep tendon reflexes symmetric 2+ in the bilateral lower extremities.  Negative Babinski bilaterally. Straight leg raise negative bilaterally.      IMAGING:  No new imaging today.    Today I personally re- reviewed AP, Lat and Flex/Ex  upright L-spine that demonstrate grade I anterolisthesis of L4/5.       ASSESSMENT/PLAN:    Diagnoses and all orders for this visit:    Spondylolisthesis, unspecified spinal region  -     Ambulatory Referral to Physical/Occupational Therapy  -     methylPREDNISolone (MEDROL DOSEPACK) 4 mg tablet; use as directed    Steroid pack sent to the pharmacy.  Prescription for PT placed today.     Return if symptoms worsen or fail to improve.

## 2017-05-16 ENCOUNTER — ANTI-COAG VISIT (OUTPATIENT)
Dept: CARDIOLOGY | Facility: CLINIC | Age: 52
End: 2017-05-16
Payer: MEDICARE

## 2017-05-16 ENCOUNTER — TELEPHONE (OUTPATIENT)
Dept: SLEEP MEDICINE | Facility: OTHER | Age: 52
End: 2017-05-16

## 2017-05-16 DIAGNOSIS — Z79.01 LONG TERM (CURRENT) USE OF ANTICOAGULANTS: Primary | ICD-10-CM

## 2017-05-16 DIAGNOSIS — I48.0 PAROXYSMAL ATRIAL FIBRILLATION: ICD-10-CM

## 2017-05-16 LAB — INR PPP: 2.3 (ref 2–3)

## 2017-05-16 PROCEDURE — 99999 PR PBB SHADOW E&M-EST. PATIENT-LVL I: CPT | Mod: PBBFAC,,, | Performed by: PHARMACIST

## 2017-05-16 PROCEDURE — 85610 PROTHROMBIN TIME: CPT | Mod: PBBFAC | Performed by: PHARMACIST

## 2017-05-16 PROCEDURE — 99211 OFF/OP EST MAY X REQ PHY/QHP: CPT | Mod: PBBFAC | Performed by: PHARMACIST

## 2017-05-16 NOTE — PROGRESS NOTES
Patient reports new medication: started a Medrol Dose mark last week and reports she has 1 day remaining. She reports taking dose of warfarin as prescribed with previous adjustment. I will place her back on her previous scheduled dose of warfarin.

## 2017-05-17 ENCOUNTER — TELEPHONE (OUTPATIENT)
Dept: SLEEP MEDICINE | Facility: OTHER | Age: 52
End: 2017-05-17

## 2017-05-18 ENCOUNTER — TELEPHONE (OUTPATIENT)
Dept: NEUROLOGY | Facility: CLINIC | Age: 52
End: 2017-05-18

## 2017-05-18 NOTE — TELEPHONE ENCOUNTER
Spoke to Pt she verbalized her appt date and time         Message from Laina Nair RN sent at 5/18/2017  9:47 AM CDT -----  Contact: Self [phone]      ----- Message -----     From: Joshua Morrison     Sent: 5/18/2017   9:45 AM       To: Dana Hernandez Staff    Patient is calling to r/s the 5-18 appt, she missed her [employer] bus, pls call

## 2017-05-22 ENCOUNTER — CLINICAL SUPPORT (OUTPATIENT)
Dept: REHABILITATION | Facility: HOSPITAL | Age: 52
End: 2017-05-22
Attending: PSYCHIATRY & NEUROLOGY
Payer: MEDICARE

## 2017-05-22 DIAGNOSIS — Z79.01 LONG TERM (CURRENT) USE OF ANTICOAGULANTS: ICD-10-CM

## 2017-05-22 DIAGNOSIS — Z74.09 IMPAIRED MOBILITY AND ADLS: ICD-10-CM

## 2017-05-22 DIAGNOSIS — S06.9X0S COGNITIVE DEFICIT AS LATE EFFECT OF TRAUMATIC BRAIN INJURY: ICD-10-CM

## 2017-05-22 DIAGNOSIS — F06.8 COGNITIVE DEFICIT AS LATE EFFECT OF TRAUMATIC BRAIN INJURY: ICD-10-CM

## 2017-05-22 DIAGNOSIS — I48.0 PAROXYSMAL ATRIAL FIBRILLATION: ICD-10-CM

## 2017-05-22 DIAGNOSIS — Z78.9 IMPAIRED MOBILITY AND ADLS: ICD-10-CM

## 2017-05-22 PROCEDURE — G9170 MEMORY D/C STATUS: HCPCS | Mod: CI,PO

## 2017-05-22 PROCEDURE — 97110 THERAPEUTIC EXERCISES: CPT | Mod: PO

## 2017-05-22 PROCEDURE — G9169 MEMORY GOAL STATUS: HCPCS | Mod: CI,PO

## 2017-05-22 PROCEDURE — 97532 *HC SP COG SKL DEV EA 15 MIN: CPT | Mod: PO

## 2017-05-22 NOTE — PROGRESS NOTES
"                                                    Physical Therapy Progress Note     Name: Amna Chawla  [facility] Number: [id number]  Diagnosis:  M54.81 (ICD-10-CM) - Bilateral occipital neuralgia   G93.1 (ICD-10-CM) - Anoxic brain injury   R51 (ICD-10-CM) - Cervicogenic headache         Physician: Missy Rock MD  Treatment Orders: PT Eval and Treat  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    PEA arrest with subsequent long-QT    Hypertension     Pacemaker 2013    Seizures     Stroke     weakness lt side       Precautions: standard  Visit #:  10  Date of Eval: 1/18/17  Re eval: 4/12/2017  Plan of Care Expiration: 7/5/2017    Functional Limitations Reports - G Codes  Category: other   Tool: cervical rotation ROM, FGA  Score: right= 46 degrees, left= 32 degrees, 16/30     G codes 9/10    G code Mobility   eval CK-CJ   4/12/17 CK-CJ               Subjective   Pt reports:c/o Left lower lumbar pain  Pain Scale: 8/10 LBP. ; 2/10 following interventions.    Objective     Patient received individual therapy to increase ROM, flexibility, posture and decrease pain with activities as follows:     Pt participated in therapeutic exercises x 45 minutes:         Supine: Serratus: 2 x 15 2#               Bruegger's: 2 x 15 OTB               Horiz Abd: 2 x 15 YTB               DKC with Swiss Ball 1' x 2                HSS B with strap 3 x 30"    side lying:            B Hip Abd 2 x 10     Sitting: BUE ER with OTB 2 x 10             Fwd trunk LB stretch with Lg. Blue Swiss ball 1' x 2     Standing: Calf stretch 3 x 30" on EOS             Written Home Exercises: 4/18/17- issued OTB for serratus stars, GTB for lat pulls and rows, chin tucks. Reviewed SKC, Nerve glides, HSS, Piriformis stretch to address LBP and radicular c/o.  Pt demo good understanding of the education " provided. Amna demonstrated good return demonstration of activities.     Education provided re: POC, HEP    Pt has no cultural, educational or language barriers to learning provided.    Assessment   Amna tolerated treatment well. Good pain reduction following interventions. Pt can benefit from continued skilled PT to address impairments to improve posture, increase functional use of LUE, and decrease headaches to improve independence and quality of life.     Pt prognosis is Good. Pt will continue to benefit from skilled outpatient physical therapy to address the deficits listed in the problem list, provide pt/family education and to maximize pt's level of independence in the home and community environment.     Anticipated barriers to physical therapy: history of CHI and CVA since initial evaluation.     Medical necessity is demonstrated by the following IMPAIRMENTS/PROMBLEM LIST:   1. Fall Risk - impaired balance   2. Weakness   3. Impaired muscle tone  4. Decreased ROM  5. Gait deviations   6. Impaired ambulation   7. Decreased activity tolerance   8. Difficulty participating in daily activities   9. Continued inability to participate in vocational pursuits   10. Requires skilled supervision to complete and progress HEP       Pt's spiritual, cultural and educational needs considered and pt agreeable to plan of care and GOALS as stated below:   GOALS:   Goals appropriate as of 4/12/17:   Short term goals: 6 weeks, pt agrees to goals set.  11. Pt will perform HEP for UE/ scapular strengthening and cervical ROM/ stability with supervision to improve carryover of progress and decrease pain.  12. Pt will demonstrate improved cervical flex/ ext ROM by 10 degrees to demonstrate improved ROM to improve posture.  13. Pt will demonstrate improved cervical rotation to 50 degrees to decrease pain.   14. Pt will demonstrate improved bilateral shoulder AROM for flexion to 140 degrees to be WFLs.  15. Pt will report decreased  left shoulder pain to 5/10 with AROM to improve mobility and demonstrate decreased pain.  16. Pt will demonstrate improved balance by scoring 19/30 on Functional Gait Assessment.      Long term goals: 12 weeks, pt agrees to goals set  8. Pt will report decreased HA to 3x/ week or less with no ER visits due to pain x 4 weeks.  9. Pt will demonstrate improved cervical flex/ ext AROM to 50 degrees to demonstrate functional ROM and to decrease pain.  10. Pt will demonstrate improved cervical rotation to 65 degrees to demonstrate improved mobility and decrease HA.  11. Pt will demonstrate bilateral shoulder AROM for abd to 130 degrees to be WFLs.   12. Pt will score 23/30 on Functional Gait Assessment to demonstrate improved balance and decreased fall risk.   13. Pt will demonstrate gait velocity WFLs for diagnosis by ambulating 1.24 m/s.  14. Pt will demonstrate improved balance and decreased fall risk by scoring 22/30 on FGA.      Plan   Continue PT 1-2x weekly under established Plan of Care, with treatment to include: pt education, HEP, therapeutic exercises, neuromuscular re-education/balance exercises, therapeutic activities, joint mobilizations, manual therapy, and modalities PRN, to work towards established goals. Pt may be seen by PTA to carry out plan of care.     Alexander Benjamin, PTA   05/22/2017

## 2017-05-22 NOTE — PROGRESS NOTES
OUTPATIENT NEUROLOGICAL REHABILITATION  SPEECH THERAPY DISCHARGE SUMMARY    Date:  05/22/2017    Start Time:  1030  Stop Time:  1110    Subjective/History  Onset Date:  February 7, 2013  Primary Diagnosis:  Anoxic Brain Injury  Treatment Diagnosis:  Cognitive-Linguistic Impairment  Referring Provider:  Dr. Rock  Orders:  for evaluation and treat  Current Medical History:  Mrs. Chawla presents to the [facility] secondary to the diagnosis of anoxic brain injury. She suffered a cardiac arrest, CVA, and fell and hit her head and suffered a concussion in 2013. She had some complications while hospitalized during that time. She now has a pacemaker and defibrillator and suffers with depression and headaches.  No family was present during this evaluation to provide history. She took the [employer] today. She attended  from 1/16/17 to 2/16/17. She reports that she had a TIA right after [place] and then subsequently hit her head on the window sill. Since the head injury, she reports getting nerve block shots. She struggles with memory and problem solving.   Past Medical History:   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    PEA arrest with subsequent long-QT    Hypertension     Pacemaker 2013    Seizures     Stroke     weakness lt side     Precautions:  fall        TIMED  Procedure Time Min.   Cognitive Therapy Start:1030   Stop: 1110  40    Start:  Stop:     Start:  Stop:     Start:  Stop:          UNTIMED  Procedure Time Min.    Start:    Stop:       Start:  Stop:      Total Minutes: 40  Total Timed Units: 3  Total Untimed Units: 0  Charges Billed/# of units: 3    Visit #: 8  Total # Of Visits:  13  Cancelled:  0  No Shows:  8  Date of Evaluation: 1/6/17  Plan of Care Expiration:  3/17/17  Extended POC:  "5/18/17  G-CODE  8/10    kallial  CJ/CI   update                Progress/Current Status    Subjective:     Pain: 2 /10  Pt reports that she has been feeling depressed the last few weeks. She reports that when she is depressed she has a hard time leaving the house. She is unable to pinpoint what the trigger was for the latest period of depression. She reports that she spoke with her neurologist and her psychiatrist who recommended she increase her dosage of abilify. She reports that her neurologist reported that after a TBI, it is common and expected to have "bouts of depression". She reports that she understands this but both she and her daughter are very disturbed when they occur. She reports that she is going to try to do something each day to challenge her brain when these things happen as she always ends up watching tv. Her daughter recommended she get out and walk around the block and that seemed to help. Pt will continue to monitor and will notify MD of any changes. Pt reports no instances of suicidal thoughts.     Objective:   Physical/Functional Status:  At time of discharge, Ms. Chawla was able to utilize memory strategies with cues, complete organization tasks IND'ly, and complete word fluency tasks.     Short Term Goals (6 weeks): Current progress as of 4/20/17  1. Mrs. Chawla will complete short term memory tasks with 90% accuracy using compensatory strategies to increase memory.Goal Met / Discontinue   - Pt completed short term memory tasks with an average of 80% accuracy IND'ly, 100% accuracy with min A.     2. Mrs. Chawla will complete moderate to complex problem solving, reasoning, mental manipulation, sequencing and organization tasks with 90% accuracy to increase cognition. Goal Met / Discontinue   - Pt completed moderate to complex problem solving, reasoning, mental manipulation, sequencing, and organization tasks with 90%  Accuracy IND'ly.    3. Mrs. Chawla will list 15 to 20 items in categories " within one minute to improve word fluency and thought organization. Goal Met / Discontinue   - average: 20 items in a category.    Long Term Goals (8 weeks):   1. Mrs. Chawla will increase her cognitive-linguistic skills using compensatory strategies to improve functional independence. Goal Met / Discontinue   2. Mrs. Chawla will demonstrate understanding and use of compensatory strategies to improve functional independence. Goal Met / Discontinue     Assessment:     Mrs. Chawla exhibited a cognitive-linguistic impairment due to late effects from a stroke and anoxic brain injury, recent TIA, and second brain injury. Her deficits were characterized by decreased short term memory, decreased attention/concentration, decreased moderate to complex problem-solving, reasoning, sequencing and organization. Auditory comprehension, verbal expression, voice, and fluency were within functional limits. She will benefit from outpatient neurological rehabilitation speech therapy. A repeat neuropsychological evaluation may be beneficial since the last one was in 2013.     Functional Communication Measure (FCM):   Severity Modifier for Medicare G-Code:  Memory  Current status: FCM:  Level 6  - CI   Projected status:  FCM:Level [id number] - CI   Discharge status:  FCM:Level 6  - CI     Other SLP Functional Limitation (Problem Solving)  Current status: FCM:  Level 6    - CI   Projected status:  FCM:  Level 6   - CI   Discharge status:  FCM:  Level 6   - CI       Patient Education/Response:     Discharge plan and ways to continue improving via home program (i.e. Memory strategies, completing crossword puzzles and floor puzzles) were discussed. Pt verbalized unerstanding.    Plans and Goals:     D/c ST.     Discharge Plan:  Ms. Chawla is being discharged from outpatient neuro rehab speech therapy for the following reason(s):  Patient has met all of his/her goals and Patient has reached the maximum rehab  potential for the present time    ELIZABETH Saunders, CCC-SLP  5/23/2017

## 2017-05-23 ENCOUNTER — TELEPHONE (OUTPATIENT)
Dept: PSYCHIATRY | Facility: CLINIC | Age: 52
End: 2017-05-23

## 2017-05-23 NOTE — TELEPHONE ENCOUNTER
----- Message from Pankaj De La Rosa MA sent at 5/23/2017  9:07 AM CDT -----  Contact: pt  Good morning your 9:00am cancelled. Pt stated her transportation service never came to pick her up.

## 2017-05-25 ENCOUNTER — PROCEDURE VISIT (OUTPATIENT)
Dept: NEUROLOGY | Facility: CLINIC | Age: 52
End: 2017-05-25
Payer: MEDICARE

## 2017-05-25 ENCOUNTER — ANTI-COAG VISIT (OUTPATIENT)
Dept: CARDIOLOGY | Facility: CLINIC | Age: 52
End: 2017-05-25
Payer: MEDICARE

## 2017-05-25 VITALS
BODY MASS INDEX: 46.24 KG/M2 | SYSTOLIC BLOOD PRESSURE: 127 MMHG | DIASTOLIC BLOOD PRESSURE: 78 MMHG | WEIGHT: 269.38 LBS | HEART RATE: 75 BPM

## 2017-05-25 DIAGNOSIS — I48.0 PAROXYSMAL ATRIAL FIBRILLATION: ICD-10-CM

## 2017-05-25 DIAGNOSIS — G43.719 INTRACTABLE CHRONIC MIGRAINE WITHOUT AURA AND WITHOUT STATUS MIGRAINOSUS: Primary | ICD-10-CM

## 2017-05-25 DIAGNOSIS — Z79.01 LONG TERM (CURRENT) USE OF ANTICOAGULANTS: Primary | ICD-10-CM

## 2017-05-25 LAB — INR PPP: 2.8 (ref 2–3)

## 2017-05-25 PROCEDURE — 85610 PROTHROMBIN TIME: CPT | Mod: PBBFAC | Performed by: PHARMACIST

## 2017-05-25 RX ORDER — CYANOCOBALAMIN 1000 UG/ML
1000 INJECTION, SOLUTION INTRAMUSCULAR; SUBCUTANEOUS ONCE
Status: COMPLETED | OUTPATIENT
Start: 2017-05-25 | End: 2017-05-25

## 2017-05-25 RX ORDER — HYDROCODONE BITARTRATE AND ACETAMINOPHEN 5; 325 MG/1; MG/1
1 TABLET ORAL EVERY 8 HOURS PRN
Qty: 30 TABLET | Refills: 0 | Status: SHIPPED | OUTPATIENT
Start: 2017-05-25 | End: 2017-06-04

## 2017-05-25 RX ADMIN — CYANOCOBALAMIN 1000 MCG: 1000 INJECTION, SOLUTION INTRAMUSCULAR at 03:05

## 2017-05-25 NOTE — PROCEDURES
Procedures   Follow up:   3 weeks ago BOTOX effect wore off   She reports severe daily HA    During BOTOX periods she feels she  her HA. Much less intense     Prior note:   The patient is a 51-year-old female who [facility]. Since her last visit, she reports growing frustration   about the fact that nothing has helped her with regards to her headaches. She   continues to experience daily headaches. She takes mefenamic acid and   tizanidine every night, which only provides her two hours of relief. She is   unable to sleep because of the refractory headaches. She is incapacitated   because of severe headaches. She reports minimal relief with infusions that she  gets on a periodic basis. She does report an improvement overall with the   Botox. However, this effect has worn off in the last two weeks. She also   reports an episode wherein she fell with loss of consciousness, which was not   provoked. As a result, she injured her ankle and is currently wearing a boot.      Prior note:   since last week - Reports vertigo for 6 - 7 minutes upto 3 times daily   Nearly daily severe HA - no response to ponstel , compazine   She is late for her BOTOX - last 2 weeks were painful       Follow up:   R sided Headache is constant max at TMJ on R   Prior note:   She reports new episodes of R face tingling. Sh also reports 2 episodes of blurred vision lasting 15 minutes. These hapended while watching TV. Her pain remains unchanged   Follow up:   2 days of severe HA during Thanksgiving   Pounding bifrontal region   Pain worse over L eye brow   Follow up:   Reports bifrontal severe HA (worse on L) with no nausea with sudden onset . Occurs daily   NO note:  I found no papilledema or other changes to suggest elevated intracranial pressure or other alternative etiology for her headaches. Repeat exam in two years.  HA are less frequent and less intense.   (R levator scapula spasm)  "  symptoms better with lateral flexion to L   Prior note:   She still has daily HA with severe sharp stabbing pain behind L eye lasting for 15 sec   Prior note:   Daily pain. 2/week - nausea.  Shu Lares RN at 9/17/2014 4:53 PM  Pt presented to clinic for medical advice. Pt is seen by Dr. Paredes and Dr. Cortez.  1) She went ER on 9/13/14 for a migraine. She was given Reglan, Benadryl, MSO4 and IVF. A couple days later she developed an all over body "tremor". She states "I think I have Parkinson's". - Per Dr. aPredes, medication related tremor (Reglan & Benadryl). Informed her it should wear off in a few days. If not, she is to call and let us know.  2) Headaches - triggered by insomnia.   Current meds:  Ketoprofen - she took it once and said her "throat closed up" and she's not supposed to have anything with aspirin in it.  Nortriptyline - she was taking it at night, but it didn't help her sleep, so she stopped it.  Per Dr. Cortez, discontinue Ketoprofen and Nortriptyline. Start Effexor XR 37.5mg QD, tizanidine 4mg BID PRN headache and Klonopin 0.25 - 0.5mg QHS PRN. Will schedule pt for sphenocath procedure within the next week.   Prior note:   48 yo woman with history of HTN and asthma, both reportedly controlled, in hospital early 2013 after "passed out" and became unresponsive. CPR in the field for 8 minutes until EMS arrived. Per ED note, on arrival she was found to be in PEA, given epinephrine. Vfib/Vtach was achieved which was shocked x1. Patient converted to sinus tachycardia after shock. I do not know the time course for the above treatment. On arrival to facility patient was in sinus tachycardia with strong pulses, intubated and unresponsive. ET tube placement was confirmed with direct laryngoscopy and good bilateral breath sounds were heard after the tube was pulled back 2 cm. Reported to have "withdrawal" movements in ER during stabilization, then sedated and cooling protocol initiated. Had " "remarkable   recovery and first seen in clinic in 2013.   Headache history: cluster   Age of onset -    Location - behind L eye   Nature of pain - sharp   Prodrome - no   Aura - No   Duration of headache - 6-7 min   Time to peak intensity -   Pain scale - range of intensity - 9/10   Frequency - daily   Status Migrainosus history - 1-3 days   Time of day of most headaches- anytime   Associated symptoms with the headache:   Red eyes   swelling of L face   Headache history: Migraine   Age of onset -    Location - bifrontal   Nature of pain - Pounding   Prodrome - no   Aura - No   Duration of headache - > 4 hrs   Time to peak intensity -   Pain scale - range of intensity - 9/10   Frequency - 5/week   Status Migrainosus history - 1-3 days   Time of day of most headaches- anytime   Associated symptoms with the headache:   Meningeal symptoms - photophobia, phonophobia, exercise intolerance +   Nausea/vomitting +   Nasal drainage   Visual blurriness +   Pallor/flushing   Dizziness   Vertigo   Confusion   Impaired concentration +   Pain worsened with physical activity +   Neck pain +   Symptoms of increased intracranial pressure:   Whooshing sounds - no   Visual spots/blotches - no   Headache Triggers: unknown   Other comorbid conditions:   Anxiety - no   Motion sickness symptom - no       Treatment history:   Resolution of headache with sleep - yes       Meds tried:   Trigger points involvin/9/15 helped for 1 day   Site: Right   Levator scapula   Pharmacological agent:   0.5% Sensorcaine solution   No complications were noted.  Supraorbital block - helped for 2 days   Tylenol - not help   imitrex - chest tightness   alleve - help   topamax - not helping   Neurontin - not helping   Paxil, Elavil - not help   seroquel - nightmare   Ketoprofen - she took it once and said her "throat closed up" and she's not supposed to have anything with aspirin in it.  Nortriptyline - she was taking it at night, but it " didn't help her sleep, so she stopped it.  reglan - parkinsonism   zanaflex - help   Toradol 30 mg IM inj at home - too expensive   BOTOX round #1 9/3/15 - helped (R levator scapula spasm)   Round #2 12/3/15 - helped   Round#3 3/316 - helped   Round #4 6/1/16 - helped   BOTOX#5 9/15/16 - helped    BOTOX#6 - 11/30/16 - helped   BOTOX#7 - 3/9/17 - helped     Can not do RFA - pt has pacemaker   Procedure: IOVERA peripheral nerve focus cold therapy (non ablative cryotherapy) 12/30/15 - not help   Indication: Cryotherapy of select peripheral nerves of the head was performed to treat chronic headaches that failed to respond to several preventive pharmacological therapies.   Sedation: None   Informed consent: The procedure, risks, benefits and options were discussed with the patient. There are no contraindications to the procedure. The patient expressed understanding and agreed to the procedure. I verify that I personally obtained the patient's consent prior to the start of the procedure.  Location:  Bilateral Supra orbital nerve (3 cycles on R and 1 cycle on L)   Bilateral Occipital nerve   3 cycles   Pain relief: Pain score reduced 10/10->0/10   9/26 Bilateral Sphenopalatine Ganglion and bilateral Maxillary Branch (Trigeminal Nerve) Block - no help   ONB - not help                                            Shannon Pagan RN at 12/31/2014 3:54 PM       Status: Signed                   Expand All Collapse All   HEADACHE FASTTRACK  PAIN 7/10 NAUSEA 0/10  ROUND 1: TORADOL, IMITREX, MORPHINE, BENADRYL, AND MAGNESIUM  PAIN 7/10 NAUSEA 0/10  PT STATED SHE WAS READY TO GO HOME AND GO TO SLEEP. PT D/C'ED IN NAD            Shannon Pagan RN at 10/5/2015 12:49 PM       Status: Signed                   Expand All Collapse All   HEADACHE FASTTRACK  PAIN 8/10 NAUSEA 10/10  FIRST ROUND: tORADOL, BENADRYL, COMPAZINE, IMITREX, MAGNESIUM, AND VALPROATE.  PAIN 1/10 NAUSEA 0/10  PT READY TO GO HOME AND FEELING BETTER. PT LEFT IN NAD WITH  FAMILY MEMBER  TOTAL TIME: 7425-8743       Shannon Pagan RN at 10/20/2015 3:05 PM       Status: Signed                   Expand All Collapse All   HEADACHE FASTTRACK  PAIN 10/10 NAUSEA 0/10  FIRST ROUND: tORADOL, BENADRYL, COMPAZINE, IMITREX, MAGNESIUM, AND VALPROATE.  BP BEING MONITORED EVERY 15 MIN AND REMAINED 170'S/80-90'S. DR CHOPRA NOTIFIED AND STATED TO MAKE SURE BP DID NOT EXCEED 180 SYSTOLIC OR PT SHOULD REPORT TO ED. BP AT 1500 183/92. DR CHOPRA NOTIFIED AND GAVE ORDER TO STOP INFUSION AND SEND PATIENT TO ED. PT BROUGHT TO ED BY INFUSION NURSE VIA WHEELCHAIR.   PAIN 8/10 NAUSEA 0/10  TOTAL TIME: 9547-6683               Progress Notes                           Shannon Pagan RN at 2/24/2016 1:36 PM       Status: Signed                  Expand All Collapse All   HEADACHE FASTTRACK  PAIN 10/10 NAUSEA 7/10  FIRST ROUND: tORADOL, BENADRYL, AND MORPHINE-BP RANGING FROM 177-203/84-92, HR 60-82  MD NOTIFIED AND GAVE ORDERS TO SEND TO ED. PT LEFT VIA W/C WITH INFUSION NURSE ON WAY TO ED.            Headache risk factors:   H/o TBI - [facility] (2013) + neck sprain   H/o Meningitis - no   F/h Aneurysms - no       Review of Systems   Constitutional: Negative.   HENT: Negative.   Eyes: Negative.   Respiratory: Negative.   Cardiovascular: Negative.   Gastrointestinal: Negative.   Endocrine: Negative.   Genitourinary: Negative.   Musculoskeletal: Negative.   Skin: Negative.   Allergic/Immunologic: Negative.   Hematological: Negative.   Psychiatric/Behavioral: insomnia      Objective:     Physical Exam   Constitutional: She is oriented to person, place, and time. She appears well-developed.   obesity   HENT:   Head: Normocephalic and atraumatic.   Right Ear: External ear normal.   Left Ear: External ear normal.   Nose: Nose normal.   Mouth/Throat: Oropharynx is clear and moist.   Eyes: Conjunctivae and EOM are normal. Pupils are equal, round, and reactive to light.   Neck: Normal range of motion.   Cardiovascular: Regular rhythm.    Pulmonary/Chest: Effort normal and breath sounds normal.   Musculoskeletal: Normal range of motion.   Neurological: She is alert and oriented to person, place, and time. She has normal reflexes. She displays normal reflexes. No cranial nerve deficit. She exhibits normal muscle tone. Coordination normal. Uses cane.   Ataxic gait - wide based   Skin: Skin is warm and dry.   Psychiatric: She has a normal mood and flat affect. Her behavior is normal. Judgment and thought content normal.   Headache Exam:  Optic disc is flat OU   Temples appear symmetric  No V1,V2,V3 distribution allodynia/hyperalgesia catalina   No facial swelling  No gross TMJ abnormalities   No ptosis   No conj injection   No meningismus   No neck stiffness  No C spine tenderness  Cervical facet tenderness on palpation catalina   No tender points over trapezium   catalina supraorbital nerve exit point tenderness  catalina occipital nerve exit point tenderness  No auriculotemporal nerve tenderness   Tenderness of levator scapula on R          Assessment:     1.  Occipital neuralgia     2.  Cervicalgia     3.  4  5  6 Chronic migraine without aura, with intractable migraine, so stated, with status migrainosus (post traumatic) post concussive?  Depression   TMJ - R sided   Insomnia       Head CT  Findings: There is no evidence of acute or recent major vascular territory cerebral infarction, parenchymal hemorrhage, or intra-axial mass.  There are no extra-axial masses or abnormal fluid collections.  The ventricular system is within normal limits of size for age and shows no distortion by mass-effect or evidence of hydrocephalus.  There is no fracture or destructive osseous lesion.  The extracranial soft tissues are unremarkable.  The visualized paranasal sinuses and mastoid air cells are clear.   Impression    No acute intracranial abnormality.  ______________________________________     Electronically signed by resident: KRISSY MAYERS MD  Date: 03/14/16  Time: 17:09             Results for PUSHPA KEY (MRN [id number]) as of 9/3/2015 14:26     Ref. Range  7/20/2015 11:06  8/19/2015 14:15    Vitamin B-12  Latest Range: 210-950 pg/mL  383       Retinol, serum  Latest Range:  ug/dL     52    Vitamin B2   Latest Range: 1-19 mcg/L      2     Vitamin B6   Latest Range: 5-50 ug/L      4 (L)     Vit D, 25-Hydroxy   Latest Range: 30-96 ng/mL   13 (L)           Plan:     1. Prophylactic medication -   Despiramine 25 mg AM   Cymbalta 120 mg daily   Aricept 20 mg daily   Neurontin 900 mg TID   Magnesium 200 mg daily  Zanaflex 8 mg PRN   Topamax 150 mg BID   Cont B2, B6 supplements   Lamictal  200 mg daily   2, Breakthrough headache - tylenol, zanaflex 8 mg, Gabitril 8 mg  PRN percocet   Multiple alternative treatment options were offered to the patient   3. Refrain from over counter pain medications. Discussed medication overuse headache.cont Magnesium 400 mg PO BID   4. Occipital neuralgia - If medical management is ineffective may consider occipital nerve blocks.   5. I again urged the patient to keep a headache calender.    f/up Dr. Rock for TBI    check vitamins again     B12 injection today- 5/25/17    BOTOX was performed as an indicated therapy for intractable chronic migraine headaches given that the patient failed > 5 prophylactic medications  Botulinum Toxin Injection Procedure   Pre-operative diagnosis: Chronic migraine   Post-operative diagnosis: Same   Procedure: Chemical neurolysis   After risks and benefits were explained including bleeding, infection, worsening of pain, damage to the areas being injected, weakness of muscles, loss of muscle control, dysphagia if injecting the head or neck, facial droop if injecting the facial area, painful injection, allergic or other reaction to the medications being injected, and the failure of the procedure to help the problem, a signed consent was obtained.   The patient was placed in a comfortable area and the sites to be treated  were identified.The area to be treated was prepped three times with alcohol and the alcohol allowed to dry. Next, a 30 gauge needle was used to inject the medication in the area to be treated.   Area(s) injected:   Total Botox used: 175 Units   Botox wastage: 25 Units   Injection sites:    muscle bilaterally ( a total of 10 units divided into 2 sites)   Procerus muscle (5 units)   Frontalis muscle bilaterally (a total of 20 units divided into 4 sites)   Temporalis muscle bilaterally (a total of 40 units divided into 8 sites)   Occipitalis muscle bilaterally (a total of 30 units divided into 6 sites)   Cervical paraspinal muscles (a total of 20 units divided into 4 sites)   Trapezius muscle bilaterally (a total of 30 units divided into 6 sites)   Masseter 10 units x 2   Splenius 5 units 2   Complications: none       RTC for the next Botox injection: 10 weeks

## 2017-05-25 NOTE — NURSING
Patient given Cyanocobalamin per order.Patient given in left deltoid.No bleeding noted.Band aid applied to site.Family present.

## 2017-05-31 ENCOUNTER — HOSPITAL ENCOUNTER (OUTPATIENT)
Dept: SLEEP MEDICINE | Facility: OTHER | Age: 52
Discharge: HOME | End: 2017-05-31
Attending: NURSE PRACTITIONER
Payer: MEDICARE

## 2017-05-31 DIAGNOSIS — G47.30 UNSPECIFIED SLEEP APNEA: ICD-10-CM

## 2017-05-31 PROCEDURE — 95810 POLYSOM 6/> YRS 4/> PARAM: CPT

## 2017-06-01 ENCOUNTER — OFFICE VISIT (OUTPATIENT)
Dept: CARDIOLOGY | Facility: CLINIC | Age: 52
End: 2017-06-01
Payer: MEDICARE

## 2017-06-01 ENCOUNTER — HOSPITAL ENCOUNTER (OUTPATIENT)
Dept: CARDIOLOGY | Facility: CLINIC | Age: 52
Discharge: HOME | End: 2017-06-01
Payer: MEDICARE

## 2017-06-01 VITALS
WEIGHT: 268.94 LBS | BODY MASS INDEX: 45.91 KG/M2 | DIASTOLIC BLOOD PRESSURE: 60 MMHG | HEART RATE: 72 BPM | SYSTOLIC BLOOD PRESSURE: 123 MMHG | HEIGHT: 64 IN

## 2017-06-01 DIAGNOSIS — M25.512 BILATERAL SHOULDER PAIN, UNSPECIFIED CHRONICITY: ICD-10-CM

## 2017-06-01 DIAGNOSIS — I10 ESSENTIAL HYPERTENSION: ICD-10-CM

## 2017-06-01 DIAGNOSIS — I48.91 ATRIAL FIBRILLATION, UNSPECIFIED TYPE: ICD-10-CM

## 2017-06-01 DIAGNOSIS — M25.511 BILATERAL SHOULDER PAIN, UNSPECIFIED CHRONICITY: ICD-10-CM

## 2017-06-01 DIAGNOSIS — R07.89 ATYPICAL CHEST PAIN: ICD-10-CM

## 2017-06-01 DIAGNOSIS — I48.0 PAROXYSMAL ATRIAL FIBRILLATION: ICD-10-CM

## 2017-06-01 DIAGNOSIS — Z86.74 HISTORY OF SUDDEN CARDIAC ARREST: ICD-10-CM

## 2017-06-01 DIAGNOSIS — F06.8 COGNITIVE DEFICIT AS LATE EFFECT OF TRAUMATIC BRAIN INJURY: ICD-10-CM

## 2017-06-01 DIAGNOSIS — R07.9 CHEST PAIN, UNSPECIFIED TYPE: Primary | ICD-10-CM

## 2017-06-01 DIAGNOSIS — S06.9X0S COGNITIVE DEFICIT AS LATE EFFECT OF TRAUMATIC BRAIN INJURY: ICD-10-CM

## 2017-06-01 DIAGNOSIS — K21.9 GASTROESOPHAGEAL REFLUX DISEASE WITHOUT ESOPHAGITIS: ICD-10-CM

## 2017-06-01 PROCEDURE — 99999 PR PBB SHADOW E&M-EST. PATIENT-LVL V: CPT | Mod: PBBFAC,GC,, | Performed by: INTERNAL MEDICINE

## 2017-06-01 PROCEDURE — 99215 OFFICE O/P EST HI 40 MIN: CPT | Mod: PBBFAC,25 | Performed by: INTERNAL MEDICINE

## 2017-06-01 PROCEDURE — 99213 OFFICE O/P EST LOW 20 MIN: CPT | Mod: S$PBB,GC,, | Performed by: INTERNAL MEDICINE

## 2017-06-01 PROCEDURE — 93005 ELECTROCARDIOGRAM TRACING: CPT | Mod: PBBFAC | Performed by: INTERNAL MEDICINE

## 2017-06-01 PROCEDURE — 93010 ELECTROCARDIOGRAM REPORT: CPT | Mod: S$PBB,,, | Performed by: INTERNAL MEDICINE

## 2017-06-01 NOTE — PROGRESS NOTES
"     Cardiology Clinic Note  Reason for Visit: follow up    HPI:     Ms Chawla is a 52yo F (well managed by Dr. Toney) with cardiac arrest 2/2013 due to electrolyte derangement s/p [employer] dcICD for prevention of sudden cardiac death, hypertension, hyperlipidemia, anoxic brain injury, and newly diagnosed atrial fibrillation, who presents for follow up.    She was recently seen for atypical chest pain and had a PET stress test - wanted to discus results. She have functional status of > 4 METS.    She also reports having a "splinter" sensation in the center of her chest that only occurs with exertion and is relieved with rest. There are no associated symptoms or radiation of discomfort. The discomfort started in late February 2017 (around [place]) and is distinctly different from atypical chest pain that prompted prior stress test. She reports that she discontinue her atorvastatin at the instruction of a provider. She wishes to have lipids checked. She is also interested in participating in cardiac rehab.        ROS:    Constitution: Negative for fever or chills. Negative for weight loss or gain.   HENT: Negative for sore throat or headaches. Negative for rhinorrhea.  Eyes: Negative for blurred or double vision.   Cardiovascular: See above  Pulmonary: Negative for SOB. Negative for cough.   Gastrointestinal: Negative for abdominal pain. Negative for nausea/vomiting. Negative for diarrhea.   : Negative for dysuria.    Skin: Negative for rashes.  Neurological: Negative for focal weakness or sensory changes.  Psychological: Negative for depression or anxiety.  PMH:     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    PEA arrest with subsequent long-QT    Hypertension     Pacemaker 2013    Seizures     Stroke     weakness lt side     Past Surgical " History:   Procedure Laterality Date    breast reduction      BREAST SURGERY      CARDIAC DEFIBRILLATOR PLACEMENT      CARDIAC DEFIBRILLATOR PLACEMENT      CARPAL TUNNEL RELEASE Right     COSMETIC SURGERY      HERNIA REPAIR      HIATAL HERNIA REPAIR      INSERT / REPLACE / REMOVE PACEMAKER      TUBAL LIGATION       Allergies:     Review of patient's allergies indicates:   Allergen Reactions    Aspirin Hives    Imitrex [sumatriptan] Palpitations    Nsaids (non-steroidal anti-inflammatory drug) Shortness Of Breath    Penicillins Hives and Swelling    Shellfish containing products Anaphylaxis     seafood    Percocet [oxycodone-acetaminophen] Itching     States can take    Reglan [metoclopramide hcl] Other (See Comments)     Parkinsonism      Medications:     Current Outpatient Prescriptions on File Prior to Visit   Medication Sig Dispense Refill    atorvastatin (LIPITOR) 40 MG tablet Take 1 tablet (40 mg total) by mouth every morning. 90 tablet 3    carvedilol (COREG) 25 MG tablet TAKE 1 TABLET(25 MG) BY MOUTH TWICE DAILY WITH MEALS 180 tablet 3    chlorthalidone (HYGROTEN) 25 MG Tab Take 1 tablet (25 mg total) by mouth once daily. 90 tablet 11    divalproex (DEPAKOTE) 250 MG EC tablet Take 250 mg by mouth once daily.   11    donepezil (ARICEPT) 10 MG tablet Take 1 tablet (10 mg total) by mouth 2 (two) times daily. 180 tablet 3    gabapentin (NEURONTIN) 300 MG capsule Take 3 capsules (900 mg total) by mouth 3 (three) times daily. 810 capsule 12    hydrocodone-acetaminophen 5-325mg (NORCO) 5-325 mg per tablet Take 1 tablet by mouth every 8 (eight) hours as needed for Pain (Headache). 30 tablet 0    lorazepam (ATIVAN) 0.5 MG tablet Take 1 tablet (0.5 mg total) by mouth every 6 (six) hours as needed for Anxiety. 30 tablet 1    magnesium 200 mg Tab Take 200 mg by mouth once daily.      ondansetron (ZOFRAN-ODT) 4 MG TbDL Take 1 tablet (4 mg total) by mouth every 24 hours as needed (nausea). 8  tablet 3    pantoprazole (PROTONIX) 40 MG tablet Take 1 tablet (40 mg total) by mouth once daily. 30 tablet 11    tizanidine (ZANAFLEX) 4 MG tablet Take 4 mg by mouth every 6 (six) hours as needed.      topiramate (TOPAMAX) 100 MG tablet Take 1.5 tablets (150 mg total) by mouth 2 (two) times daily. 90 tablet 12    trazodone (DESYREL) 100 MG tablet Take 2 tablets (200 mg total) by mouth every evening. 30 tablet 11    VITAMIN D2 50,000 unit capsule TAKE 1 CAPSULE BY MOUTH EVERY 7 DAYS 6 capsule 0    warfarin (COUMADIN) 5 MG tablet Take 1.5-2 tablets (7.5-10 mg total) by mouth Daily. Current Dose ( 7.5 mg on Tue, Thu, Sat; 10 mg all other days) or as directed by coumadin clinic. 60 tablet 3    warfarin (COUMADIN) 5 MG tablet TAKE 1 1/2 TABLET BY MOUTH ON TUESDAYS, THURSDAYS AND SATURDAYS. TAKE 2 TABLETS BY MOUTH ON ALL OTHER DAYS 1060 tablet 3    zolpidem (AMBIEN) 10 mg Tab Take 1 tablet (10 mg total) by mouth nightly as needed. 30 tablet 5    [DISCONTINUED] aripiprazole (ABILIFY) 10 MG Tab Take 1 tablet (10 mg total) by mouth once daily. 90 tablet 3    [DISCONTINUED] azithromycin (Z-ROMY) 250 MG tablet       [DISCONTINUED] lamotrigine (LAMICTAL) 200 MG tablet Take 1 tablet (200 mg total) by mouth once daily. 30 tablet 11    [DISCONTINUED] methylPREDNISolone (MEDROL DOSEPACK) 4 mg tablet use as directed 1 Package 0     Current Facility-Administered Medications on File Prior to Visit   Medication Dose Route Frequency Provider Last Rate Last Dose    cyanocobalamin injection 1,000 mcg  1,000 mcg Intramuscular Q30 Days David Cortez MD   1,000 mcg at 04/17/17 1322    onabotulinumtoxina injection 200 Units  2 vial Intramuscular Q90 Days David Cortez MD   200 Units at 09/15/16 1225    onabotulinumtoxina injection 200 Units  2 vial Intramuscular Q90 Days David Cortez MD        onabotulinumtoxina injection 200 Units  2 vial Intramuscular Q90 Days David Cortez MD   200 Units at 11/30/16 1136     "onabotulinumtoxina injection 200 Units  200 Units Intramuscular Q10 weeks David Cortez MD   200 Units at 03/09/17 1343    onabotulinumtoxina injection 200 Units  200 Units Intramuscular Q90 Days David Cortez MD        onabotulinumtoxina injection 200 Units  200 Units Intramuscular Q90 Days David Cortez MD         Social History:     Social History   Substance Use Topics    Smoking status: Never Smoker    Smokeless tobacco: Never Used    Alcohol use No     Family History:     Family History   Problem Relation Age of Onset    Hypertension Mother     COPD      Heart failure       Physical Exam:   /60 (BP Location: Left arm, Patient Position: Sitting, BP Method: Automatic)   Pulse 72   Ht 5' 4" (1.626 m)   Wt 122 kg (268 lb 15.4 oz)   BMI 46.17 kg/m²      Constitutional: No apparent distress, conversant  HEENT: Sclera anicteric, extraocular movements intact  Neck: No jugular venous distension, no carotid bruits  CV: Regular rate and rhythm, no murmurs rubs or gallops, normal S1/S2  Pulm: Clear to auscultation bilaterally, no wheezes, rales, or ronchi  GI: Abdomen soft, obese, nontender, nondistended, normoactive bowel sounds  Extremities: No lower extremity edema, warm with palpable pulses  Skin: No ecchymosis, erythema, or ulcers  Psych: Alert and oriented to person place location, appropriate affect  Neuro: No focal deficits    Labs:     Lab Results   Component Value Date     04/14/2016    K 4.0 04/14/2016     04/14/2016    CO2 23 04/14/2016    BUN 12 04/14/2016    CREATININE 0.8 04/14/2016    ANIONGAP 6 (L) 04/14/2016     Lab Results   Component Value Date    AST 14 03/03/2016    ALT 19 03/03/2016    ALKPHOS 55 03/03/2016    BILITOT 0.2 03/03/2016    ALBUMIN 3.3 (L) 03/03/2016     Lab Results   Component Value Date    CALCIUM 8.7 04/14/2016    MG 1.9 03/20/2017    PHOS 4.3 05/30/2015     Lab Results   Component Value Date    BNP 11 02/02/2014    BNP 90 02/11/2013     (H) " 02/07/2013    Lab Results   Component Value Date    WBC 4.32 03/13/2017    HGB 12.7 03/13/2017    HCT 37.4 03/13/2017     03/13/2017    GRAN 2.2 03/13/2017    GRAN 51.2 03/13/2017     Lab Results   Component Value Date    INR 2.8 05/25/2017    INR 1.1 03/13/2017     Lab Results   Component Value Date    CHOL 221 (H) 03/20/2017    HDL 43 03/20/2017    LDLCALC 163.2 (H) 03/20/2017    TRIG 74 03/20/2017     Lab Results   Component Value Date    HGBA1C 6.0 02/02/2014     Lab Results   Component Value Date    TSH 1.407 03/13/2017    M4BUNUP 8.4 02/07/2013          Imaging:     Dobutamine stress echo 2/3/14    1 - Normal left ventricular systolic function (EF 55-60%).     2 - Severe left atrial enlargement.     3 - Left ventricular diastolic dysfunction.     4 - Normal right ventricular systolic function.     No evidence of stress induced myocardial ischemia.     CTA 2/15/13  1.  Normal CT coronary angiogram  2.  Coronary calcium score is 0.  3.  Type I bovine arch.  4.  Normal pulmonary veins.  5.  SVC appears normal.  6.  Interatrial septum appears to be intact.  7. Scan is of moderate quality due to contrast timing and decreased signal to noise ratio.    EF   Date Value Ref Range Status   02/03/2014 55     02/13/2013  60     02/08/2013  20       EKG:  3/15/17 -  with underlying normal sinus rhythm    PET stress 3/2017:  1. There is no evidence of a discrete hemodynamically significant coronary stenosis.   2. Although no clinically significant stress defect is seen, there is resting flow heterogeneity that improves after Dipyridamole. These results suggest mild endothelial dysfunction due to high blood pressure and mild, diffuse, non-obstructive coronary atherosclerosis without clinically significant, localized perfusion defects.   3. Resting wall motion is physiologic. Stress wall motion is physiologic.   4. LV function is normal at rest and stress.  (normal is >= 51%)  5. The ventricular volumes are normal at  rest and stress.   6. The extracardiac distribution of radioactivity is normal.   7. There was no previous study available to compare.    Assessment/Plan:     Ms Chawla is a 50yo F with cardiac arrest 2/2013 due to electrolyte derangement s/p [employer] dcICD for prevention of sudden cardiac death, hypertension, hyperlipidemia, anoxic brain injury, and newly diagnosed atrial fibrillation, who presents for follow up. Her symptoms of aytpical chest pain are non exertional and recent PET stress showed no ischemia. She can walk for > 1 mile without any chest pain or [employer].     1- Atrial fibrillation   - [id number] score of 2 (gender, HTN) is consistent with 2.2% risk of stroke per year  - continue warfarin for anticoagulation  - on carvedilol, as below, for rate control     2-Atypical chest discomfort  - non exertional   - normal coronary CTA with calcium score of 0 in 2013 and negative dobutamine stress test in 2014 and PET stress in 3/2017 with no ischemia. Based on history and physical - this atypical pain is most likely MSK vs anxiety related.   - Her PET stress from 3/2017 was WNL    3-Hypertension   - controlled on current regimen of carvedilol 25mg BID and chlorthalidone 25mg daily    4- Hyperlipidemia   - continue atorvastatin 40mg daily    5- Complete heart block s/p dcICD   - most recent ICD remote interrogation 3/8/17 with atrial fibrillation    6-  Anoxic brain injury   - on aripiprazole, divalproex, gabapentin, lamotrigine, topiramate    Follow up with Dr. Toney as scheduled. No change is management strategy and continue treatment per Dr. Toney`s plan.         Signed:  Elmer Ruggiero MD  Cardiology Fellow, PGY-6

## 2017-06-08 ENCOUNTER — ANTI-COAG VISIT (OUTPATIENT)
Dept: CARDIOLOGY | Facility: CLINIC | Age: 52
End: 2017-06-08
Payer: MEDICARE

## 2017-06-08 DIAGNOSIS — I48.0 PAROXYSMAL ATRIAL FIBRILLATION: ICD-10-CM

## 2017-06-08 DIAGNOSIS — Z79.01 LONG TERM (CURRENT) USE OF ANTICOAGULANTS: Primary | ICD-10-CM

## 2017-06-08 LAB — INR PPP: 2.4 (ref 2–3)

## 2017-06-08 PROCEDURE — 99211 OFF/OP EST MAY X REQ PHY/QHP: CPT | Mod: PBBFAC | Performed by: PHARMACIST

## 2017-06-08 PROCEDURE — 99999 PR PBB SHADOW E&M-EST. PATIENT-LVL I: CPT | Mod: PBBFAC,,, | Performed by: PHARMACIST

## 2017-06-08 PROCEDURE — 85610 PROTHROMBIN TIME: CPT | Mod: PBBFAC | Performed by: PHARMACIST

## 2017-06-20 ENCOUNTER — TELEPHONE (OUTPATIENT)
Dept: SLEEP MEDICINE | Facility: CLINIC | Age: 52
End: 2017-06-20

## 2017-06-20 DIAGNOSIS — G47.33 OBSTRUCTIVE SLEEP APNEA: Primary | ICD-10-CM

## 2017-06-22 ENCOUNTER — ANTI-COAG VISIT (OUTPATIENT)
Dept: CARDIOLOGY | Facility: CLINIC | Age: 52
End: 2017-06-22
Payer: MEDICARE

## 2017-06-22 DIAGNOSIS — I48.0 PAROXYSMAL ATRIAL FIBRILLATION: ICD-10-CM

## 2017-06-22 DIAGNOSIS — Z79.01 LONG TERM (CURRENT) USE OF ANTICOAGULANTS: ICD-10-CM

## 2017-06-22 LAB — INR PPP: 2.5 (ref 2–3)

## 2017-06-22 PROCEDURE — 85610 PROTHROMBIN TIME: CPT | Mod: PBBFAC

## 2017-06-22 NOTE — PROGRESS NOTES
Patient reports no changes to alter INR. Will maintain dose.  Advised to call Coumadin Clinic with any issues.  I have reviewed the student's initial findings and agree with their assessment. I have personally spoken with and assessed the patient in clinic to devise care plan.

## 2017-06-27 ENCOUNTER — TELEPHONE (OUTPATIENT)
Dept: PSYCHIATRY | Facility: CLINIC | Age: 52
End: 2017-06-27

## 2017-06-27 NOTE — TELEPHONE ENCOUNTER
----- Message from Bee Gonzalez sent at 6/27/2017  8:26 AM CDT -----  Contact: Self  Pt call to cancel her appt for this morning at 9:00 am due to Transportation.

## 2017-06-28 ENCOUNTER — OFFICE VISIT (OUTPATIENT)
Dept: NEUROLOGY | Facility: CLINIC | Age: 52
End: 2017-06-28
Attending: PSYCHIATRY & NEUROLOGY
Payer: MEDICARE

## 2017-06-28 VITALS
SYSTOLIC BLOOD PRESSURE: 132 MMHG | BODY MASS INDEX: 45.27 KG/M2 | HEIGHT: 64 IN | DIASTOLIC BLOOD PRESSURE: 80 MMHG | WEIGHT: 265.19 LBS | HEART RATE: 74 BPM

## 2017-06-28 DIAGNOSIS — G43.719 INTRACTABLE CHRONIC MIGRAINE WITHOUT AURA AND WITHOUT STATUS MIGRAINOSUS: Primary | ICD-10-CM

## 2017-06-28 DIAGNOSIS — F06.8 COGNITIVE DEFICIT AS LATE EFFECT OF TRAUMATIC BRAIN INJURY: ICD-10-CM

## 2017-06-28 DIAGNOSIS — G47.8 UARS (UPPER AIRWAY RESISTANCE SYNDROME): ICD-10-CM

## 2017-06-28 DIAGNOSIS — G93.1 ANOXIC BRAIN INJURY: ICD-10-CM

## 2017-06-28 DIAGNOSIS — E55.9 VITAMIN D DEFICIENCY: ICD-10-CM

## 2017-06-28 DIAGNOSIS — S09.90XS HEADACHES DUE TO OLD HEAD INJURY: ICD-10-CM

## 2017-06-28 DIAGNOSIS — S09.90XD CHI (CLOSED HEAD INJURY), SUBSEQUENT ENCOUNTER: ICD-10-CM

## 2017-06-28 DIAGNOSIS — S06.9X0S COGNITIVE DEFICIT AS LATE EFFECT OF TRAUMATIC BRAIN INJURY: ICD-10-CM

## 2017-06-28 DIAGNOSIS — G50.0 SUPRAORBITAL NEURALGIA: ICD-10-CM

## 2017-06-28 DIAGNOSIS — G44.309 HEADACHES DUE TO OLD HEAD INJURY: ICD-10-CM

## 2017-06-28 DIAGNOSIS — R41.89 COGNITIVE DEFICITS: ICD-10-CM

## 2017-06-28 PROCEDURE — 99213 OFFICE O/P EST LOW 20 MIN: CPT | Mod: S$PBB,,, | Performed by: PSYCHIATRY & NEUROLOGY

## 2017-06-28 PROCEDURE — 99999 PR PBB SHADOW E&M-EST. PATIENT-LVL III: CPT | Mod: PBBFAC,,, | Performed by: PSYCHIATRY & NEUROLOGY

## 2017-06-28 PROCEDURE — 99213 OFFICE O/P EST LOW 20 MIN: CPT | Mod: PBBFAC | Performed by: PSYCHIATRY & NEUROLOGY

## 2017-06-28 RX ORDER — LAMOTRIGINE 200 MG/1
200 TABLET ORAL DAILY
COMMUNITY
Start: 2017-06-05 | End: 2017-08-10

## 2017-06-28 NOTE — PROGRESS NOTES
Subjective:       Patient ID: Amna Chawla is a 51 y.o. female.    Chief Complaint:  Concussion      History of Present Illness    52 yo AAF with HIE w/ CVA (2013), Asthma, CAD, depression, HTN, PM, seizures, CVA who presents for concussion evaluation.  Pt was last seen on 3/22/17 and at that time we recommended:  -conservative measures: cognitive rest, avoid further TBIs, abstain from EtOH  -sleep/wake cycle:   -sleep hygiene   -trazodone 200mg QHS PRN, educated about serotonin syndrome   -sleep clinic referral UARS and CPAP  -cognitive(memory losses):  SLP,   -ADLs: OT  -headaches: per Dr. Cortez   -LEON epstein completed   -PT cervical ROM  -recommend patient move to assisted living with support of daughter, letter provided  -BIALA and ABIS referral      In the interim, she has seen Dr. Cortez for her headaches and underwent botox. Botox has been helpful.   She was seen by sleep clinic and is pending tx PSG with CPAP.  Not using trazodone.    She is pending placement to an apartment with supportive.   She has completed PT/OT.       Pt was last seen on 2/21/17 and at that time we recommended:  -conservative measures: cognitive rest, avoid further TBIs, abstain from EtOH  -sleep/wake cycle:   -sleep hygiene   -trazodone 100mg QHS PRN, educated about serotonin syndrome   -sleep clinic referral UARS and CPAP  -cognitive(memory losses):  SLP, repeat neuropsych test  -ADLs: OT  -headaches: per Dr. Cortez   -PT cervical ROM  -BIALA and ABIS referral    In the interim, pt had another mTBI.  She struck her head on a window and developed persistent headaches.    She reports that she just was not paying attention.  No falls, seizures, syncopal episodes.  Given AC and persistent headache she was advised to go to the ED.  CTH did not reveal any new ICH.  Currently she has a headache which is on your left tempor-occipital.  She rates it as 7/10.   She says it is improving and responds to acetaminophen.   She is endorses good sleep.  "She takes trazodone 200mg as needed (~3 times a week).  She is pending sleep clinic appointment for UARS and CPAP assessment.  She denies any cognitive issues from baseline.          Pt was last seen on 1/4/17 and at that time we recommended:    -conservative measures: cognitive rest, avoid further TBIs, abstain from EtOH  -MRI Brain (contra-indicated due to PM)  -sleep/wake cycle:   -sleep hygiene   -melatonin 0.1-0.3mg at night  -cognitive(memory losses):  SLP, repeat neuropsych test  -ADLs: OT  -headaches: per Dr. Cortez   -PT cervical ROM  -BIALA and ABIS referral    In the interim, pt continues to have headaches.  She has started SLP and PT.  NP and OT testing is pending.  Pt is not sleeping well.  She tried melatonin in the past and developed 'shakes'.  She has tried trazodone with no effect either positive or adverse.  She was evaluated by [facility] in 2014 and found to have UARS and insurance did not cover CPAP.       Initial HPI(1/4/17)  Per previous records, patient was in the hospital early 2013 after "passed out" and became unresponsive. CPR in the field for 8 minutes until EMS arrived. Per ED note, on arrival she was found to be in PEA, given epinephrine. Vfib/Vtach was achieved which was shocked x1. Patient converted to sinus tachycardia after shock. I do not know the time course for the above treatment. On arrival to facility patient was in sinus tachycardia with strong pulses, intubated and unresponsive. ET tube placement was confirmed with direct laryngoscopy and good bilateral breath sounds were heard after the tube was pulled back 2 cm. Reported to have "withdrawal" movements in ER during stabilization, then sedated and cooling protocol initiated. Had remarkable recovery and first seen in clinic in April 2013.  During this episode patient fell and hit her head.  NO bleed was noted at that time.    She has extensive headaches.  She is on numerous PPX medications and also receives botox and " infusion therapy.    Her headaches are associated with neck pain.  She has received trigger point injections which have helped.  She has associated nausea, dizziness.    She endorses poor sleep.  She is currently using tizanadine.  She endorses cognitive issues and is currently donepezil, which helps somewhat.  Her mood is labile, and currently is good.  She denies SI/HI.  She has episodes that she calls seizures.  She says she is aware of shaking episodes (2-3 since the cardiac arrest).    She denies any changes in vision, previous head/neck trauma, appetite/smell changes.  She is no longer having her cycle.  SHe is not involved in litigation.   She lives with her daughters.   SHe is independent in ADLs, but requires assistance iADLs.  She acknowledges difficulties with house-hold chores, e.g. Cooking, dish washing, etc.            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    PEA arrest with subsequent long-QT    Hypertension     Pacemaker 2013    Seizures     Stroke     weakness lt side       Past Surgical History:   Procedure Laterality Date    breast reduction      BREAST SURGERY      CARDIAC DEFIBRILLATOR PLACEMENT      CARDIAC DEFIBRILLATOR PLACEMENT      CARPAL TUNNEL RELEASE Right     COSMETIC SURGERY      HERNIA REPAIR      HIATAL HERNIA REPAIR      INSERT / REPLACE / REMOVE PACEMAKER      TUBAL LIGATION         Family History   Problem Relation Age of Onset    Hypertension Mother     COPD      Heart failure         Social History     Social History    Marital status: Single     Spouse name: N/A    Number of children: N/A    Years of education: N/A     Occupational History     [employer]     Social History Main Topics    Smoking status: Never Smoker    Smokeless tobacco: Never Used    Alcohol use No    Drug use: No     Sexual activity: No     Other Topics Concern    Not on file     Social History Narrative    No narrative on file     2/21/17            Current Outpatient Prescriptions:     atorvastatin (LIPITOR) 40 MG tablet, Take 1 tablet (40 mg total) by mouth every morning., Disp: 90 tablet, Rfl: 3    carvedilol (COREG) 25 MG tablet, TAKE 1 TABLET(25 MG) BY MOUTH TWICE DAILY WITH MEALS, Disp: 180 tablet, Rfl: 3    divalproex (DEPAKOTE) 250 MG EC tablet, Take 250 mg by mouth once daily. , Disp: , Rfl: 11    gabapentin (NEURONTIN) 300 MG capsule, Take 3 capsules (900 mg total) by mouth 3 (three) times daily., Disp: 810 capsule, Rfl: 12    lorazepam (ATIVAN) 0.5 MG tablet, Take 1 tablet (0.5 mg total) by mouth every 6 (six) hours as needed for Anxiety., Disp: 30 tablet, Rfl: 1    magnesium 200 mg Tab, Take 200 mg by mouth once daily., Disp: , Rfl:     ondansetron (ZOFRAN-ODT) 4 MG TbDL, Take 1 tablet (4 mg total) by mouth every 24 hours as needed (nausea)., Disp: 8 tablet, Rfl: 3    pantoprazole (PROTONIX) 40 MG tablet, Take 1 tablet (40 mg total) by mouth once daily., Disp: 30 tablet, Rfl: 11    VITAMIN D2 50,000 unit capsule, TAKE 1 CAPSULE BY MOUTH EVERY 7 DAYS, Disp: 6 capsule, Rfl: 0    warfarin (COUMADIN) 5 MG tablet, TAKE 1 1/2 TABLET BY MOUTH ON TUESDAYS, THURSDAYS AND SATURDAYS. TAKE 2 TABLETS BY MOUTH ON ALL OTHER DAYS, Disp: 1060 tablet, Rfl: 3    zolpidem (AMBIEN) 10 mg Tab, Take 1 tablet (10 mg total) by mouth nightly as needed., Disp: 30 tablet, Rfl: 5    chlorthalidone (HYGROTEN) 25 MG Tab, Take 1 tablet (25 mg total) by mouth once daily., Disp: 90 tablet, Rfl: 11    donepezil (ARICEPT) 10 MG tablet, Take 1 tablet (10 mg total) by mouth 2 (two) times daily., Disp: 180 tablet, Rfl: 3    lamotrigine (LAMICTAL) 200 MG tablet, Take 200 mg by mouth once daily at 6am., Disp: , Rfl:     tizanidine (ZANAFLEX) 4 MG tablet, Take 4 mg by mouth every 6 (six) hours as needed., Disp: , Rfl:     topiramate  (TOPAMAX) 100 MG tablet, Take 1.5 tablets (150 mg total) by mouth 2 (two) times daily., Disp: 90 tablet, Rfl: 12    trazodone (DESYREL) 100 MG tablet, Take 2 tablets (200 mg total) by mouth every evening., Disp: 30 tablet, Rfl: 11    Current Facility-Administered Medications:     cyanocobalamin injection 1,000 mcg, 1,000 mcg, Intramuscular, Q30 Days, David Cortez MD, 1,000 mcg at 04/17/17 1322    onabotulinumtoxina injection 200 Units, 2 vial, Intramuscular, Q90 Days, David Cortez MD, 200 Units at 09/15/16 1225    onabotulinumtoxina injection 200 Units, 2 vial, Intramuscular, Q90 Days, David Cortez MD    onabotulinumtoxina injection 200 Units, 2 vial, Intramuscular, Q90 Days, David Cortez MD, 200 Units at 11/30/16 1136    onabotulinumtoxina injection 200 Units, 200 Units, Intramuscular, Q10 weeks, David Cortez MD, 200 Units at 03/09/17 1343    onabotulinumtoxina injection 200 Units, 200 Units, Intramuscular, Q90 Days, David Cortez MD    onabotulinumtoxina injection 200 Units, 200 Units, Intramuscular, Q90 Days, David Cortez MD    Review of Systems  Review of Systems   Constitutional: Negative for chills and fever.   Gastrointestinal: Positive for nausea and vomiting.   Neurological: Positive for dizziness, focal weakness, seizures, loss of consciousness and headaches. Negative for tingling, tremors, sensory change and speech change.   Psychiatric/Behavioral: Positive for depression and memory loss. Negative for hallucinations, substance abuse and suicidal ideas. The patient has insomnia. The patient is not nervous/anxious.        1/4/16          Objective:     Vitals:    06/28/17 1016   BP: 132/80   Pulse: 74      Physical Exam      Constitutional: obese AA female  HENT:   Head: Normocephalic and atraumatic.   Neck and spine: Normal range of motion. Neck supple. No TTP in cervical, thoracic, and lumbar spine  Cardiovascular: Normal rate, regular rhythm and normal heart sounds.     Pulmonary/Chest: Effort normal and breath sounds normal.   Abdominal: Soft. Bowel sounds are normal.   Skin: Skin is warm.   Ext: No c/c/e noted    The patient is awake, attentive, Alert, oriented to person, place and time.  Language is intact to comprehension, repetition, and production  Able to follow multi-step commands  No findings to suggest executive dysfuntion    Patient has adequate insight    Mood is stable      Pursuits were smooth, normal saccades, no nystagmus bilaterally  PERRL, VFFC, EOMI, sensation is diminished to LT L    Motor - facial movement was symmetrical and normal, no facial droop seen.   hearing grossly intact  Palate moved well and was symmetrical with normal palatal and oral sensation  Tongue protrudes midline and movements were full      Normal tone.  No spasticity, cogwheel rigidity  Normal bulk. No pronator drift                        Right      Left  Deltoid            5          5  Biceps            5          5  Triceps           5          5  BR                  5          5                   5          5    Hip Flex            5          5  Hip Ext             5          5  Leg ABduc       5          5  Leg ADduc       5          5  Knee Flex         5          5  Knee Ext          5          5  Plantar Flexion  5          5  Dorsiflexion       5          5      No tremor noted    Reflexes normal and symmteric in bl upper and lower extremities, including biceps, triceps, and patellar    Sensory:  Light touch: diminished on L  Pinprick sensation:  diminished on L    Coordination:  F-to-N:  intact    H-to-S:  intact   Rapid-alt movements:  Normal amplitude and frequency     Romberg Sign:  negative  General gait:   Normal arm swing and turns. Normal postural reflexes.   Tandem gait:  Intact    Visuospatial/Executive  Alternating Trail Makin - correct  Cube: 0 - incorrect  Clock Contour: 1 - correct  Clock Numbers: 1 - correct  Clock Hands: 1 - correct  Naming  Lion: 1 -  correct  Rhino: 1 - correct  Camel: 1 - correct  Memory - First Trial  Face: Yes  Velvet: Yes  Mormonism: Yes  Laurie: Yes  Red: Yes  Memory - Second Trial  Face: Yes  Velvet: Yes  Mormonism: Yes  Laurie: Yes  Red: Yes  Attention  Forward Order - 2 1 8 5 4: 1 - correct  Attention - Backward Order: 1 - correct  Letters: 1 - correct  Numbers: 2 - 2 or 3 correct  Language  Repeat - Aric: 1 - correct  Repeat - Cat: 1 - correct  Fluency : 1 - correct  Abstraction  Train - Bicycle: 1 - correct  Watch-Ruler: 1 - correct  Delayed Recall  Face: 0 - incorrect  Velvet: 0 - incorrect  Mormonism: 0 - incorrect  Laurie: 0 - incorrect  Red: 0 - incorrect  Orientation  Date: 1 - correct  Month: 1 - correct  Year: 1 - correct  Day: 1 - correct  Place: 1 - correct  City: 1 - correct  Other  Add 1 point if less than or equal to 12 yr edu: 0 - incorrect  Total Score: 23 (1/4/16)                          TSH   Date/Time Value Ref Range Status   03/13/2017 10:05 AM 1.407 0.400 - 4.000 uIU/mL Final   05/29/2015 12:30 PM 1.819 0.400 - 4.000 uIU/mL Final   05/07/2014 12:18 PM 1.368 0.400 - 4.000 uIU/mL Final   02/02/2014 09:26 PM 1.112 0.400 - 4.000 uIU/mL Final   02/07/2013 06:04 PM 13.210 (H) 0.4 - 4.0 uIU/ml Final   Results for PUSHPA KEY (MRN [id number]) as of 1/4/2017 09:19   Ref. Range 7/20/2015 11:06 8/19/2015 14:15   Vitamin B2 Latest Ref Range: 1 - 19 mcg/L  2   Vitamin B6 Latest Ref Range: 5 - 50 ug/L  4 (L)   Vit D, 25-Hydroxy Latest Ref Range: 30 - 96 ng/mL 13 (L)      Neuro-imaging personally reviewed    Results for orders placed or performed during the hospital encounter of 03/14/16   CT Head Without Contrast    Narrative    Procedure comment: CT examination of the head was performed from the skull base through the vertex without the use of intravenous contrast using 5-mm axial images.    Comparison: CT head 03/24/2015    Findings: There is no evidence of acute or recent major vascular territory cerebral infarction, parenchymal  hemorrhage, or intra-axial mass.  There are no extra-axial masses or abnormal fluid collections.  The ventricular system is within normal limits of size for age and shows no distortion by mass-effect or evidence of hydrocephalus.  There is no fracture or destructive osseous lesion.  The extracranial soft tissues are unremarkable.  The visualized paranasal sinuses and mastoid air cells are clear.    Impression     No acute intracranial abnormality.  ______________________________________     Electronically signed by resident: KRISSY MAYERS MD  Date:     03/14/16  Time:    17:09     ______________________________________     Electronically signed by: RAYMOND MATHEWS MD  Date:     03/14/16  Time:    17:31    Results for PUSHPA KEY (MRN [id number]) as of 1/4/2017 09:19   Ref. Range 7/20/2015 11:06   Vitamin B-12 Latest Ref Range: 210 - 950 pg/mL 383     SUMMARY AND RECOMMENDATIONS from neuropsych testing:   Ms. Key was referred for Neuropsychological Evaluation on an outpatient basis due to subjective residual memory impairment following acute cardiac arrest complicated by a stroke and closed head injury (hypoxic brain injury), all of which occurred on 2/7/2013. Her general cognitive abilities as assessed by the RBANS were in the severely impaired range, with moderately impaired language (due to reduced verbal fluency); and severely impaired immediate verbal memory, visuospatial/constructional abilities, attention, and delayed memory. Further assessment of specific cognitive abilities revealed deficits in temporal orientation, naming, verbal fluency, and facial recognition. Constructional ability was unimpaired. Additional memory assessment revealed severely impaired immediate and delayed memory. Personality test data suggested the presence of severe depression and moderate anxiety. Neuropsychological testing reveals moderate to severe impairment in memory, attention, temporal orientation,  visuospatial/constructional abilities, facial recognition, and verbal fluency due to hypoxic brain injury and stroke. Naming was variable with performances in the average and moderately impaired range. She will follow up with Juan Carlos Childers DNP in Psychiatry for depression and anxiety.    Assessment:        1. Intractable chronic migraine without aura and without status migrainosus     2. UARS (upper airway resistance syndrome)     3. Anoxic brain injury     4. CHI (closed head injury), subsequent encounter     5. Supraorbital neuralgia     6. Headaches due to old head injury     7. Vitamin D deficiency      8. Cognitive deficits     9. 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  History is notable for PEA s/p cooling and suspected HIE and CVA in 2013 and subsequent chronic and intractable headaches.  Exam is notable for obese AAF with normal cervical ROM, CTAB, and RRR.  Neuro exam is notable for pleasant female, with diminished facial sensation on L, o/w intact CN including VFFC, normal tone and, no pronator drift, decreased sensation LUE and LLE, normal gait, negative Romberg, negative cortical signs, and MOCA 23/30 (immediate recall, visuospatial)  Workup is notable unremarkable CTH, low Vit D, low normal B12, low B6.  Neuropsych eval revealed anxiety/depression and cognitive impairments in memory, attention, temporal orientation, visuospatial/constructional abilities, facial recognition, and verbal fluency.  Pt's presentation is c/w HIE.   It is unlikely her concussion is contributing to her headaches, but other events like CVA and HIE also contributing.        Plan:       -conservative measures: cognitive rest, avoid further TBIs, abstain from EtOH  -sleep/wake cycle:   -sleep hygiene   -trazodone 100mg QHS PRN, educated about serotonin syndrome   -sleep clinic  "referral UARS and CPAP  -cognitive(memory losses):  SLP, repeat neuropsych test  -ADLs: OT  -headaches: per Dr. Cortez   -PT cervical ROM  -BIALA and ABIS referral        Missy Rock MD  Neurologist  [facility]     Focused examination was undertaken today.  I spent 25 minutes with the patient.  >50% of that time was spent on counseling regarding her symptoms, review of diagnostics, and building and coordinating a treatment plan based on the combination of results and symptoms.   Questions were sought and answered to her stated verbal satisfaction.    Subjective:       Patient ID: Amna Chawla is a 51 y.o. female.    Chief Complaint:  Concussion      History of Present Illness    50 yo AAF with HIE w/ CVA (2013), Asthma, CAD, depression, HTN, PM, seizures, CVA who presents for concussion evaluation.  Pt was last seen on 1/4/17 and at that time we recommended:    -conservative measures: cognitive rest, avoid further TBIs, abstain from EtOH  -MRI Brain (contra-indicated due to PM)  -sleep/wake cycle:   -sleep hygiene   -melatonin 0.1-0.3mg at night  -cognitive(memory losses):  SLP, repeat neuropsych test  -ADLs: OT  -headaches: per Dr. Cortez   -PT cervical ROM  -BIALA and ABIS referral    In the interim, pt continues to have headaches.  She has started SLP and PT.  NP and OT testing is pending.  Pt is not sleeping well.  She tried melatonin in the past and developed 'shakes'.  She has tried trazodone with no effect either positive or adverse.  She was evaluated by [facility] in 2014 and found to have UARS and insurance did not cover CPAP.       Initial HPI(1/4/17)  Per previous records, patient was in the hospital early 2013 after "passed out" and became unresponsive. CPR in the field for 8 minutes until EMS arrived. Per ED note, on arrival she was found to be in PEA, given epinephrine. Vfib/Vtach was achieved which was shocked x1. Patient converted to sinus tachycardia after shock. I " "do not know the time course for the above treatment. On arrival to facility patient was in sinus tachycardia with strong pulses, intubated and unresponsive. ET tube placement was confirmed with direct laryngoscopy and good bilateral breath sounds were heard after the tube was pulled back 2 cm. Reported to have "withdrawal" movements in ER during stabilization, then sedated and cooling protocol initiated. Had remarkable recovery and first seen in clinic in April 2013.  During this episode patient fell and hit her head.  NO bleed was noted at that time.    She has extensive headaches.  She is on numerous PPX medications and also receives botox and infusion therapy.    Her headaches are associated with neck pain.  She has received trigger point injections which have helped.  She has associated nausea, dizziness.    She endorses poor sleep.  She is currently using tizanadine.  She endorses cognitive issues and is currently donepezil, which helps somewhat.  Her mood is labile, and currently is good.  She denies SI/HI.  She has episodes that she calls seizures.  She says she is aware of shaking episodes (2-3 since the cardiac arrest).    She denies any changes in vision, previous head/neck trauma, appetite/smell changes.  She is no longer having her cycle.  SHe is not involved in litigation.   She lives with her daughters.   SHe is independent in ADLs, but requires assistance iADLs.  She acknowledges difficulties with house-hold chores, e.g. Cooking, dish washing, etc.            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    PEA arrest with subsequent long-QT    Hypertension     Pacemaker 2013    Seizures     Stroke     weakness lt side       Past Surgical History:   Procedure Laterality Date    breast reduction      BREAST SURGERY      CARDIAC DEFIBRILLATOR " PLACEMENT      CARDIAC DEFIBRILLATOR PLACEMENT      CARPAL TUNNEL RELEASE Right     COSMETIC SURGERY      HERNIA REPAIR      HIATAL HERNIA REPAIR      INSERT / REPLACE / REMOVE PACEMAKER      TUBAL LIGATION         Family History   Problem Relation Age of Onset    Hypertension Mother     COPD      Heart failure         Social History     Social History    Marital status: Single     Spouse name: N/A    Number of children: N/A    Years of education: N/A     Occupational History     [employer]     Social History Main Topics    Smoking status: Never Smoker    Smokeless tobacco: Never Used    Alcohol use No    Drug use: No    Sexual activity: No     Other Topics Concern    Not on file     Social History Narrative    No narrative on file     2/21/17            Current Outpatient Prescriptions:     atorvastatin (LIPITOR) 40 MG tablet, Take 1 tablet (40 mg total) by mouth every morning., Disp: 90 tablet, Rfl: 3    carvedilol (COREG) 25 MG tablet, TAKE 1 TABLET(25 MG) BY MOUTH TWICE DAILY WITH MEALS, Disp: 180 tablet, Rfl: 3    divalproex (DEPAKOTE) 250 MG EC tablet, Take 250 mg by mouth once daily. , Disp: , Rfl: 11    gabapentin (NEURONTIN) 300 MG capsule, Take 3 capsules (900 mg total) by mouth 3 (three) times daily., Disp: 810 capsule, Rfl: 12    lorazepam (ATIVAN) 0.5 MG tablet, Take 1 tablet (0.5 mg total) by mouth every 6 (six) hours as needed for Anxiety., Disp: 30 tablet, Rfl: 1    magnesium 200 mg Tab, Take 200 mg by mouth once daily., Disp: , Rfl:     ondansetron (ZOFRAN-ODT) 4 MG TbDL, Take 1 tablet (4 mg total) by mouth every 24 hours as needed (nausea)., Disp: 8 tablet, Rfl: 3    pantoprazole (PROTONIX) 40 MG tablet, Take 1 tablet (40 mg total) by mouth once daily., Disp: 30 tablet, Rfl: 11    VITAMIN D2 50,000 unit capsule, TAKE 1 CAPSULE BY MOUTH EVERY 7 DAYS, Disp: 6 capsule, Rfl: 0    warfarin (COUMADIN) 5 MG tablet, TAKE 1 1/2 TABLET BY MOUTH ON TUESDAYS,  THURSDAYS AND SATURDAYS. TAKE 2 TABLETS BY MOUTH ON ALL OTHER DAYS, Disp: 1060 tablet, Rfl: 3    zolpidem (AMBIEN) 10 mg Tab, Take 1 tablet (10 mg total) by mouth nightly as needed., Disp: 30 tablet, Rfl: 5    chlorthalidone (HYGROTEN) 25 MG Tab, Take 1 tablet (25 mg total) by mouth once daily., Disp: 90 tablet, Rfl: 11    donepezil (ARICEPT) 10 MG tablet, Take 1 tablet (10 mg total) by mouth 2 (two) times daily., Disp: 180 tablet, Rfl: 3    lamotrigine (LAMICTAL) 200 MG tablet, Take 200 mg by mouth once daily at 6am., Disp: , Rfl:     tizanidine (ZANAFLEX) 4 MG tablet, Take 4 mg by mouth every 6 (six) hours as needed., Disp: , Rfl:     topiramate (TOPAMAX) 100 MG tablet, Take 1.5 tablets (150 mg total) by mouth 2 (two) times daily., Disp: 90 tablet, Rfl: 12    trazodone (DESYREL) 100 MG tablet, Take 2 tablets (200 mg total) by mouth every evening., Disp: 30 tablet, Rfl: 11    Current Facility-Administered Medications:     cyanocobalamin injection 1,000 mcg, 1,000 mcg, Intramuscular, Q30 Days, David Cortez MD, 1,000 mcg at 04/17/17 1322    onabotulinumtoxina injection 200 Units, 2 vial, Intramuscular, Q90 Days, David Cortez MD, 200 Units at 09/15/16 1225    onabotulinumtoxina injection 200 Units, 2 vial, Intramuscular, Q90 Days, David Cortez MD    onabotulinumtoxina injection 200 Units, 2 vial, Intramuscular, Q90 Days, David Cortez MD, 200 Units at 11/30/16 1136    onabotulinumtoxina injection 200 Units, 200 Units, Intramuscular, Q10 weeks, David Cortez MD, 200 Units at 03/09/17 1343    onabotulinumtoxina injection 200 Units, 200 Units, Intramuscular, Q90 Days, David Cortez MD    onabotulinumtoxina injection 200 Units, 200 Units, Intramuscular, Q90 Days, David Cortez MD    Review of Systems  Review of Systems   Constitutional: Negative for chills and fever.   Gastrointestinal: Positive for nausea and vomiting.   Neurological: Positive for dizziness, focal weakness, seizures, loss of  consciousness and headaches. Negative for tingling, tremors, sensory change and speech change.   Psychiatric/Behavioral: Positive for depression and memory loss. Negative for hallucinations, substance abuse and suicidal ideas. The patient has insomnia. The patient is not nervous/anxious.        1/4/16          Objective:     Vitals:    06/28/17 1016   BP: 132/80   Pulse: 74      Physical Exam      Constitutional: obese AA female  HENT:   Head: Normocephalic and atraumatic.   Neck and spine: Normal range of motion. Neck supple. No TTP in cervical, thoracic, and lumbar spine  Cardiovascular: Normal rate, regular rhythm and normal heart sounds.    Pulmonary/Chest: Effort normal and breath sounds normal.   Abdominal: Soft. Bowel sounds are normal.   Skin: Skin is warm.   Ext: No c/c/e noted    The patient is awake, attentive, Alert, oriented to person, place and time.  Language is intact to comprehension, repetition, and production  Able to follow multi-step commands  No findings to suggest executive dysfuntion    Patient has adequate insight    Mood is stable      Pursuits were smooth, normal saccades, no nystagmus bilaterally  PERRL, VFFC, EOMI, sensation is diminished to LT L    Motor - facial movement was symmetrical and normal, no facial droop seen.   hearing grossly intact  Palate moved well and was symmetrical with normal palatal and oral sensation  Tongue protrudes midline and movements were full      Normal tone.  No spasticity, cogwheel rigidity  Normal bulk. No pronator drift                        Right      Left  Deltoid            5          5  Biceps            5          5  Triceps           5          5  BR                  5          5                   5          5    Hip Flex            5          5  Hip Ext             5          5  Leg ABduc       5          5  Leg ADduc       5          5  Knee Flex         5          5  Knee Ext          5          5  Plantar Flexion  5           5  Dorsiflexion       5          5      No tremor noted    Reflexes normal and symmteric in bl upper and lower extremities, including biceps, triceps, and patellar    Sensory:  Light touch: diminished on L  Pinprick sensation:  diminished on L    Coordination:  F-to-N:  intact    H-to-S:  intact   Rapid-alt movements:  Normal amplitude and frequency     Romberg Sign:  negative  General gait:   Normal arm swing and turns. Normal postural reflexes.   Tandem gait:  Intact    Visuospatial/Executive  Alternating Trail Makin - correct  Cube: 0 - incorrect  Clock Contour: 1 - correct  Clock Numbers: 1 - correct  Clock Hands: 1 - correct  Naming  Lion: 1 - correct  Rhino: 1 - correct  Camel: 1 - correct  Memory - First Trial  Face: Yes  Velvet: Yes  Synagogue: Yes  Laurie: Yes  Red: Yes  Memory - Second Trial  Face: Yes  Velvet: Yes  Synagogue: Yes  Laurie: Yes  Red: Yes  Attention  Forward Order - 2 1 8 5 4: 1 - correct  Attention - Backward Order: 1 - correct  Letters: 1 - correct  Numbers: 2 - 2 or 3 correct  Language  Repeat - Aric: 1 - correct  Repeat - Cat: 1 - correct  Fluency : 1 - correct  Abstraction  Train - Bicycle: 1 - correct  Watch-Ruler: 1 - correct  Delayed Recall  Face: 0 - incorrect  Velvet: 0 - incorrect  Synagogue: 0 - incorrect  Laurie: 0 - incorrect  Red: 0 - incorrect  Orientation  Date: 1 - correct  Month: 1 - correct  Year: 1 - correct  Day: 1 - correct  Place: 1 - correct  City: 1 - correct  Other  Add 1 point if less than or equal to 12 yr edu: 0 - incorrect  Total Score: 23 (16)                          TSH   Date/Time Value Ref Range Status   2017 10:05 AM 1.407 0.400 - 4.000 uIU/mL Final   2015 12:30 PM 1.819 0.400 - 4.000 uIU/mL Final   2014 12:18 PM 1.368 0.400 - 4.000 uIU/mL Final   2014 09:26 PM 1.112 0.400 - 4.000 uIU/mL Final   2013 06:04 PM 13.210 (H) 0.4 - 4.0 uIU/ml Final   Results for PUSHPA KEY (MRN [id number]) as of 2017 09:19   Ref. Range 2015  11:06 8/19/2015 14:15   Vitamin B2 Latest Ref Range: 1 - 19 mcg/L  2   Vitamin B6 Latest Ref Range: 5 - 50 ug/L  4 (L)   Vit D, 25-Hydroxy Latest Ref Range: 30 - 96 ng/mL 13 (L)      Neuro-imaging personally reviewed    Results for orders placed or performed during the hospital encounter of 03/14/16   CT Head Without Contrast    Narrative    Procedure comment: CT examination of the head was performed from the skull base through the vertex without the use of intravenous contrast using 5-mm axial images.    Comparison: CT head 03/24/2015    Findings: There is no evidence of acute or recent major vascular territory cerebral infarction, parenchymal hemorrhage, or intra-axial mass.  There are no extra-axial masses or abnormal fluid collections.  The ventricular system is within normal limits of size for age and shows no distortion by mass-effect or evidence of hydrocephalus.  There is no fracture or destructive osseous lesion.  The extracranial soft tissues are unremarkable.  The visualized paranasal sinuses and mastoid air cells are clear.    Impression     No acute intracranial abnormality.  ______________________________________     Electronically signed by resident: KRISSY MAYERS MD  Date:     03/14/16  Time:    17:09     ______________________________________     Electronically signed by: RAYMOND MATHEWS MD  Date:     03/14/16  Time:    17:31    Results for PUSHPA KEY (MRN [id number]) as of 1/4/2017 09:19   Ref. Range 7/20/2015 11:06   Vitamin B-12 Latest Ref Range: 210 - 950 pg/mL 383     SUMMARY AND RECOMMENDATIONS from neuropsych testing:   Ms. Key was referred for Neuropsychological Evaluation on an outpatient basis due to subjective residual memory impairment following acute cardiac arrest complicated by a stroke and closed head injury (hypoxic brain injury), all of which occurred on 2/7/2013. Her general cognitive abilities as assessed by the RBANS were in the severely impaired range, with moderately  impaired language (due to reduced verbal fluency); and severely impaired immediate verbal memory, visuospatial/constructional abilities, attention, and delayed memory. Further assessment of specific cognitive abilities revealed deficits in temporal orientation, naming, verbal fluency, and facial recognition. Constructional ability was unimpaired. Additional memory assessment revealed severely impaired immediate and delayed memory. Personality test data suggested the presence of severe depression and moderate anxiety. Neuropsychological testing reveals moderate to severe impairment in memory, attention, temporal orientation, visuospatial/constructional abilities, facial recognition, and verbal fluency due to hypoxic brain injury and stroke. Naming was variable with performances in the average and moderately impaired range. She will follow up with Juan Carlos Childers DNP in Psychiatry for depression and anxiety.      CT(3/2017) Neuro-imaging personally reviewed  No acute intracranial abnormality identified.     INR 1.8(3/20/17)  Assessment:        1. Intractable chronic migraine without aura and without status migrainosus     2. UARS (upper airway resistance syndrome)     3. Anoxic brain injury     4. CHI (closed head injury), subsequent encounter     5. Supraorbital neuralgia     6. Headaches due to old head injury     7. Vitamin D deficiency      8. Cognitive deficits     9. 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  History is notable for PEA s/p cooling and suspected HIE and CVA in 2013 and subsequent chronic and intractable headaches.  Interval hx is notable for another mTBI.  Exam is notable for obese AAF with normal cervical ROM, CTAB, and RRR.  Neuro exam is notable for pleasant female, with diminished facial sensation on L, o/w intact CN including VFFC, normal tone  and, no pronator drift, decreased sensation LUE and LLE, normal gait, negative Romberg, negative cortical signs, and MOCA 23/30 (immediate recall, visuospatial)  Workup is notable unremarkable INR 1.8(3/20/17), CTH, low Vit D, low normal B12, low B6.  Neuropsych eval revealed anxiety/depression and cognitive impairments in memory, attention, temporal orientation, visuospatial/constructional abilities, facial recognition, and verbal fluency.  Pt's presentation is c/w HIE.   It is unlikely her concussion is contributing to her headaches, but other events like CVA and HIE also contributing.        Plan:       -conservative measures: cognitive rest, avoid further TBIs, abstain from EtOH  -sleep/wake cycle:   -sleep hygiene   -stop trazodone    -f/u sleep clinic referral for CPAP  -cognitive(memory losses):  SLP,   -ADLs: OT  -cervical ROM: PT  -headaches: per Dr. Cortez  -recommend patient move to assisted living with support of daughter, letter provided  -[facility] and [facility] referral    RTC LEENA Rock MD  Neurologist  [facility]     Focused examination was undertaken today.  I spent 25 minutes with the patient.  >50% of that time was spent on counseling regarding her symptoms, review of diagnostics, and building and coordinating a treatment plan based on the combination of results and symptoms.   Questions were sought and answered to her stated verbal satisfaction.    Greater Occipital Nerve Block Injection Procedure  (3/22/17)  Pre-operative diagnosis: Cervigogenic headache   Post-operative diagnosis: Same   Procedure: Chemical neurolysis     Procedure note:   GONB (Greater Occipital Nerve Block): After risks and benefits were explained including bleeding, infection, worsening of pain, damage to the areas being injected, weakness of muscles, or loss of muscle control.  After informed consent was obtained (a copy was given to office staff to scan into the EMR), the patient was asked  to remain in a sitting position with their head resting prone ontheir chest. The area was prepped using alcohol swabs. 2% lidocaine (2 mL x2 sides of neck=4 mL total) and 40 mg (1 bottle per sitex2 for total of 80 mg)depomedrol was drawn up utilizing a 21 gauge needle. The occipital trigger points were palpated utilizing latex gloves, a 27 gauge needle was utilized and aspiration to ensure no blood was used during the technique. The medication was delivered bilateral (all of the above was duplicated for the opposite side) in sites 1) midway between the inion and mastoid along the occipital ridge, 2) 2 finger breaths superior lateral to the first injection and 3) 2 finger breaths superior medial to the first injection. The patient tolerated the procedure well with no active bleeding, erythema, or discharge.  The patient was assessed and allowed to leave after ten minutes.  Findings from repeat exam (10 mins after procedure) showed:    Gen: NAD  Derm: no drainage  Neuro: AOx3, EOMI, tongue in midline, FROM of all extremities, OOC/gait WNL         Missy Rock MD  Neurologist  [facility]

## 2017-06-28 NOTE — PATIENT INSTRUCTIONS
-sleep/wake cycle:   -sleep hygiene   -sleep clinic referral UARS and CPAP  We have recommended speech, occupational, and physical therapy for you.  Please contact [phone] to schedule.  -headaches: per Dr. Cortez  -recommend patient move to assisted living with support of daughter, letter provided  -MAEGAN and DEANDRA referral    -Brain Health lifestyle modifications:    -sleep hygiene   - diet: Mediterranean Diet    -exercise: 20-30 minutes of  Moderate exercise 3-5 times a week   -stress management reviewed   -smoking cessation, alcohol moderation    Check out my blog post for further information:  [email or web address]    Sleep Hygiene:    1. Avoid watching TV, eating, and discussing emotional issues in bed. The bed should be used for sleep and sex only. If not, we can associate the bed with other activities and it often becomes difficult to fall asleep.  2. Minimize noise, light, and temperature extremes during sleep with ear plugs, window blinds, or an electric blanket or air conditioner. Even the slightest nighttime noises or luminescent lights can disrupt the quality of your sleep. Try to keep your bedroom at a comfortable temperature -- not too hot (above 75 degrees) or too cold (below 54 degrees).  3. Try not to drink fluids after 8 p.m. This may reduce awakenings due to urination.  4. Avoid naps, but if you do nap, make it no more than about 25 minutes about eight hours after you awake. But if you have problems falling asleep, then no naps for you.  5. Do not expose your self to bright light if you need to get up at night. Use a small night-light instead.  6. Nicotine is a stimulant and should be avoided particularly near bedtime and upon night awakenings. Having a smoke before bed, although it may feel relaxing, is actually putting a stimulant into your bloodstream.  7. Caffeine is also a stimulant and is present in coffee (100-200 mg), soda (50-75  mg), tea (50-75 mg), and various over-the-counter medications. Caffeine should be discontinued at least four to six hours before bedtime. If you consume large amounts of caffeine and you cut your self off too quickly, beware; you may get headaches that could keep you awake.  8. Although alcohol is a depressant and may help you fall asleep, the subsequent metabolism that clears it from your body when you are sleeping causes a withdrawal syndrome. This withdrawal causes awakenings and is often associated with nightmares and sweats.  9. A light snack may be sleep-inducing, but a heavy meal too close to bedtime interferes with sleep. Stay away from protein and stick to carbohydrates or dairy products. Milk contains the amino acid L-tryptophan, which has been shown in research to help people go to sleep. So milk and cookies or crackers (without chocolate) may be useful and taste good as well.

## 2017-06-29 ENCOUNTER — CLINICAL SUPPORT (OUTPATIENT)
Dept: ELECTROPHYSIOLOGY | Facility: CLINIC | Age: 52
End: 2017-06-29
Payer: MEDICARE

## 2017-06-29 ENCOUNTER — HOSPITAL ENCOUNTER (OUTPATIENT)
Dept: RADIOLOGY | Facility: HOSPITAL | Age: 52
Discharge: HOME | End: 2017-06-29
Attending: ORTHOPAEDIC SURGERY
Payer: MEDICARE

## 2017-06-29 ENCOUNTER — OFFICE VISIT (OUTPATIENT)
Dept: ORTHOPEDICS | Facility: CLINIC | Age: 52
End: 2017-06-29
Payer: MEDICARE

## 2017-06-29 VITALS — BODY MASS INDEX: 45.88 KG/M2 | WEIGHT: 267.31 LBS

## 2017-06-29 DIAGNOSIS — R00.2 PALPITATIONS: ICD-10-CM

## 2017-06-29 DIAGNOSIS — Z95.810 AUTOMATIC IMPLANTABLE CARDIAC DEFIBRILLATOR IN SITU: Primary | ICD-10-CM

## 2017-06-29 DIAGNOSIS — Z95.0 CARDIAC PACEMAKER IN SITU: Primary | ICD-10-CM

## 2017-06-29 DIAGNOSIS — M19.079 1ST MTP ARTHRITIS: Primary | ICD-10-CM

## 2017-06-29 DIAGNOSIS — I44.2 CHB (COMPLETE HEART BLOCK): ICD-10-CM

## 2017-06-29 DIAGNOSIS — M79.671 RIGHT FOOT PAIN: ICD-10-CM

## 2017-06-29 DIAGNOSIS — I45.81 PROLONGED QT SYNDROME: ICD-10-CM

## 2017-06-29 DIAGNOSIS — Z95.810 AUTOMATIC IMPLANTABLE CARDIAC DEFIBRILLATOR IN SITU: ICD-10-CM

## 2017-06-29 PROCEDURE — 93283 PRGRMG EVAL IMPLANTABLE DFB: CPT | Mod: PBBFAC | Performed by: INTERNAL MEDICINE

## 2017-06-29 PROCEDURE — 99999 PR PBB SHADOW E&M-EST. PATIENT-LVL IV: CPT | Mod: PBBFAC,,, | Performed by: PHYSICIAN ASSISTANT

## 2017-06-29 PROCEDURE — 73630 X-RAY EXAM OF FOOT: CPT | Mod: 26,RT,, | Performed by: RADIOLOGY

## 2017-06-29 PROCEDURE — 99213 OFFICE O/P EST LOW 20 MIN: CPT | Mod: S$PBB,,, | Performed by: PHYSICIAN ASSISTANT

## 2017-06-29 PROCEDURE — 73630 X-RAY EXAM OF FOOT: CPT | Mod: TC,RT

## 2017-06-29 NOTE — PROGRESS NOTES
Subjective:      Patient ID: Amna Chawla is a 51 y.o. female.    Chief Complaint: No chief complaint on file.    HPI  51 year old female presents with chief complaint of right foot pain x 2-3 weeks. She says her grandson stepped on her foot. Pain is at the 1st MTP. Pain is sharp and it comes and goes. Tylenol does not help. She denies swelling. She wears tennis shoes with inserts.   Review of Systems   Constitution: Negative for chills, fever and night sweats.   Cardiovascular: Negative for chest pain.   Respiratory: Negative for cough and shortness of breath.    Skin: Negative for color change.   Gastrointestinal: Negative for heartburn.   Genitourinary: Negative for dysuria.   Neurological: Negative for numbness and paresthesias.   Psychiatric/Behavioral: Negative for altered mental status.   Allergic/Immunologic: Negative for persistent infections.         Objective:            General    Vitals reviewed.  Constitutional: She is oriented to person, place, and time. She appears well-developed and well-nourished.   Cardiovascular: Normal rate.    Neurological: She is alert and oriented to person, place, and time.         Right Ankle/Foot Exam     Inspection   Erythema: absent    Tenderness   The patient is tender to palpation of the great toe metatarsophalangeal joint.    Pain   The patient exhibits pain of the great toe metatarsophalangeal joint.    Range of Motion   The patient has normal right ankle ROM.  First MTP Joint: painful    Other   Sensation: normal        Vascular Exam     Right Pulses  Dorsalis Pedis:      2+              X-ray: ordered and reviewed by myself. No fracture or dislocation.  Mild hallux valgus with mild degenerative changes throughout the forefoot.  Lisfranc articulation is congruent.  Achilles enthesopathy and plantar calcaneal spur noted.        Assessment:       Encounter Diagnosis   Name Primary?   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 Continue good supportive shoes with inserts. Ice and elevate foot. RTC prn.

## 2017-06-30 ENCOUNTER — OUTPATIENT CASE MANAGEMENT (OUTPATIENT)
Dept: ADMINISTRATIVE | Facility: OTHER | Age: 52
End: 2017-06-30

## 2017-07-01 NOTE — PROGRESS NOTES
For your information:    The following patient has been assigned to Vianey Barnard RN  with [facility] for high risk screening.    Reason: High Risk    Please contact \[facility] [place] [facility]\"" at [phone] with any questions.    Thank you,    Annemarie Wang, SSC

## 2017-07-07 ENCOUNTER — OUTPATIENT CASE MANAGEMENT (OUTPATIENT)
Dept: ADMINISTRATIVE | Facility: OTHER | Age: 52
End: 2017-07-07

## 2017-07-07 ENCOUNTER — TELEPHONE (OUTPATIENT)
Dept: CARDIOLOGY | Facility: HOSPITAL | Age: 52
End: 2017-07-07

## 2017-07-07 NOTE — PROGRESS NOTES
Talked to patient to perform initial assessment for OPCM.  Patient performed initial assessment in a hurried fashion and was talking to another person while assessment was being performed.  Patient is a 53 y/o female with dx of cognitive deficits, HA, DVT, HTN, HLD, GERD, Pacemaker, hx of CVA (2013) and impaired functional mobility.  Patient stated that currently she lives with her two daughters in an apartment.  Patient stated that she doesn't drive but one of her daughters will bring her to all of her medical appointments.  PHQ-2 performed and score of 2 obtained.  Medication reconciliation performed and is taking all medications as prescribed.  Patient stated that within the past year she has fallen once without injury.  Patient stated that she does use a cane to assist with ambulation.  Patient stated that currently she receives from disability 780.00/month.  Patient stated that she could use assistance with medication.  I will in-basket message pharmacy assistance to possibly assist patient in acquiring her medications.  Patient stated that she does need assistance in obtaining food stamps and stated that her food stamp application is pending at this point.  Encouraged patient to call this RN if having any questions/concerns.   I will mail my contact information should any new problems/concerns arise in the future.  I will mail literature about [facility].  I will mail patient educational materials regarding fall prevention, traumatic brain injury, migraine headache.  Will consult JADE Mueller-CAMILO for coordination of care and any possible community resources that would be available for patient.  Will admit to OPCM.  Will continue to follow.  JADE Alvarenga-RN

## 2017-07-07 NOTE — TELEPHONE ENCOUNTER
Patient contacted the [facility] on this afternoon with c/o feeling fast HR off and on for the last couple of days.  Patient is without any other symptoms.  Instructed the patient to send a manual transmission from her Remote ICD home monitor for review.  Patient verbalized understanding and stated she will send the transmission.

## 2017-07-07 NOTE — PATIENT INSTRUCTIONS
Understanding Atrial Fibrillation    An arrhythmia is any problem with the speed or pattern of the heartbeat. Atrial fibrillation (AFib) is a common type of arrhythmia. It causes fast, chaotic electrical signals in the atria. This leads to poor functioning of the heart. It also affects how much blood your heart can pump out to the body.  Afib may occur once in a while and go away on its own. Or it may continue for longer periods and need treatment.  AFib can lead to serious problems, such as stroke. Your healthcare provider will need to monitor and manage it.  What happens during atrial fibrillation?   The heart has an electrical system that sends signals to control the heartbeat. As signals move through the heart, they tell the hearts upper chambers (atria) and lower chambers (ventricles) when to squeeze (contract) and relax. This lets blood move through the heart and out to the body and lungs.  With AFib, the atria receive abnormal signals. This causes them to contract in a fast and irregular way, and out of sync with the ventricles. When this happens, the atria also have a harder time moving blood into the ventricles. Blood may then pool in the atria, which increases the risk for blood clots and stroke. The ventricles also may contract too quickly and irregularly. As a result, they may not pump blood to the body and lungs as well as they should. This can weaken the heart muscle over time and cause heart failure.  What causes atrial fibrillation?  AFib is more common in older adults. It has many possible causes including:  · Coronary artery disease  · Heart valve disease  · Heart attack  · Heart surgery  · High blood pressure  · Thyroid disease  · Diabetes  · Lung disease  · Sleep apnea  · Heavy alcohol use  In some cases of AFib, doctors do not know the cause.  What are the symptoms of atrial fibrillation?  AFib may or may not cause symptoms. If symptoms do occur, they may include:  · A fast, pounding,  irregular heartbeat  · Shortness of breath  · Tiredness  · Dizziness or fainting  · Chest pain  How is atrial fibrillation treated?  Treatments for AFib can include any of the options below.  · Medicines. You may be prescribed:  ¨ Heart rate medicines to help slow down the heartbeat  ¨ Heart rhythm medicines to help the heart beat more regularly  ¨ Anti-clotting medicines to help reduce the risk for blood clots and stroke  · Electrical cardioversion. Your healthcare provider uses special pads or paddles to send one or more brief electrical shocks to the heart. This can help reset the heartbeat to normal.  · Ablation. Long, thin tubes called catheters are threaded through a blood vessel to the heart. There, the catheters send out hot or cold energy to the areas causing the abnormal signals. This energy destroys the problem tissue or cells. This improves the chances that your heart will stay in normal rhythm without using medicines. If your heart rate and rhythm cant be controlled, you may need ablation and a pacemaker. These will help control the heart rate and regularity of the heartbeat.  · Surgery. During surgery, your healthcare provider may use different methods to create scar tissue in the areas of the heart causing the abnormal signals. The scar tissue disrupts the abnormal signals and may stop AFib from occurring.  What are the complications of atrial fibrillation?  These can include:  · Blood clots  · Stroke  · Heart failure. This problem occurs when the heart muscle weakens so much that it can no longer pump blood well.  When should I call my healthcare provider?  Call your healthcare provider right away if you have any of these:  · Symptoms that dont get better with treatment, or get worse  · New symptoms   Date Last Reviewed: 5/1/2016  © 8597-5966 [employer]. [address] [place] [address], [place] [address]. All rights reserved. This information is not intended as a substitute for professional  medical care. Always follow your healthcare professional's instructions.        Understanding Traumatic Brain Injury (TBI)  Breathing, blood flow, and movement are all controlled by the brain. The brain also allows you to think, handle emotions, and make judgments. TBI can be due to a closed head injury or a penetrating injury. The most common causes are falls, blunt accidental trauma, motor vehicle accidents and assault. After an injury, certain parts of the brain (or the links between these parts) may not be working optimally. Some mental or physical skills may be altered. The altered function may be short- or long-term. The full effects of a brain injury may not appear for months or even years and typically depends on the severity and the number of brain injuries as well as the time in between these injuries. Long-term effects, initially not present, include post-traumatic stress disorder and cognitive decline (early dementia). TBI can range from mild concussions to fatal injuries.  How injury happens     The brain rebounds  from the impact. As a result, the brain may hit the opposite side of the skull or twist on the brain stem.        The brain strikes the skull. This may happen if the head hits a hard surface or if a person is severely shaken or jerked.   The skull does not have to be harmed for the brain to be injured (this is called a closed head injury). Injury can occur when the brain strikes the skull. In many cases, the brain rebounds from the first impact and hits the opposite side of the skull. Sometimes the brain twists on the brain stem.  Types of damage  When the brain strikes the skull or twists on the brain stem, brain tissue tears. This injury may then cause a second type of damage, such as bleeding or swelling in the brain. Health care providers try to control the second type of damage to help limit long-term problems.       Tearing  If nerve fibers in the brain tissue tear, signals cant pass  between the brain and body. Lost signals mean altered skills or body functions. Bleeding  A torn blood vessel may leak into nearby tissue. This kills brain cells and can lead to a buildup of blood (hematoma). If this blood presses on the brain, it can cut off blood to other cells. These cells also die. Swelling  The brain has almost no room to expand inside the skull. If the brain swells, it may press against the skull. As the pressure increases, the brain functions may be altered.   Date Last Reviewed: 10/19/2015  © 6061-2799 [employer]. [address] [place] [id number]. All rights reserved. This information is not intended as a substitute for professional medical care. Always follow your healthcare professional's instructions.        Preventing Deep Vein Thrombosis  Healthcare providers use the term venous thromboembolism (VTE) to describe two conditions, deep vein thrombosis (DVT) and pulmonary embolism (PE). They use the term VTE because the two conditions are very closely related. And, because their prevention and treatment are closely related.   DVT is a blood clot or thrombus in a deep vein. Most of these clots develop in the leg or thigh. But, they may form in a vein in the arm, or other part of the body.   Part of the blood clot may separate from the vein. This is called an embolus. It may travel to the lungs and form a pulmonary embolus. This can cut off the flow of blood to a portion of or to the entire lung. A blood clot in the lungs is a medical emergency and may cause death.  Over time, blood clots can also permanently damage veins. They must be treated right away to prevent problems.   Risk factors  Anyone can develop a blood clot. But the following risk factors make a blood clot more likely to happen:  · Being inactive for a long period, such as when youre in the hospital, or traveling by plane or car  · Injury to a vein from an accident, a broken bone, or  surgery  · Having blood clots in the past or a family history of blood clots  · Blood clotting disorder  · Recent surgery  · Cancer and certain cancer treatments  · Smoking  Other factors can also put you at higher risk for a blood clot. They include:  · Age over 60 years  · Pregnancy  · Taking birth control pills or hormone replacement  · Having other vein problems, such as varicose veins   · Being overweight  · Having a pacemaker or a central venous catheter. They increase the chance of a blood clot forming in an arm.  · Injection drug use. This also increases the chance of a blood clot forming in an arm.  How to prevent DVT  Preventing a blood clot means improving blood flow back to your heart. To help prevent a blood clot:  · Talk with your healthcare provider about a program of regular exercise.  · If your legs feel swollen or heavy, take a break and sit comfortably or lie down with your feet up.  · Keep a healthy weight.  · Quit smoking, if you smoke.  · Avoid sitting, standing, or lying down for long periods without moving your legs and feet:  ¨ When traveling by car, make frequent stops to get out and move around.  ¨ On long airplane, train, or bus rides, get up and move around when possible.  ¨ If you cant get up, wiggle your toes and tighten your calves to keep your blood moving, as pictured below.  If you need to have surgery, talk with your healthcare provider about a plan to prevent blood clots.   If you are in the hospital, your risk for blood clots increases. Your healthcare provider may prescribe an anticoagulant medicine or a blood thinner to help prevent blood clots. Or your healthcare provider may prescribe a sequential compression device (SCD) or intermittent pneumatic compression (IPC). The device has sleeves that fit around your legs. It applies gentle pressure to help with blood flow and prevent blood clots. Remove the sleeves so that you do not trip or fall when you are walking, like when  you use the bathroom or shower. If you need help removing the sleeves, ask the nurse or aid. You may also want to try the following:    When to seek immediate medical attention  If you have symptoms of a blood clot in your lungs, call 911 or get emergency help. The symptoms are:  · Chest pain  · Trouble breathing  · Fast heartbeat  · Coughing (may cough up blood)  · Sweating  · Fainting  When to call your healthcare provider  If you have symptoms of a blood clot, call your healthcare provider. The symptoms are:  · Pain  · Swelling  · Redness or discoloration in a leg, arm, or other area   Date Last Reviewed: 5/1/2016 © 2000-2016 [employer]. [address] [place] [address], [place] [address]. All rights reserved. This information is not intended as a substitute for professional medical care. Always follow your healthcare professional's instructions.        Epilepsy: Safety During a Seizure  Safety during a seizure  Let family and friends know what to expect and how to react when you have a seizure. This helps keep them calm and you safe. All seizures should be treated with care, but tonic-clonic seizures (seizures during which you lose consciousness) require more attention. Here are some pointers for loved ones.  What to know  Seizures typically last less than 3 minutes, but it will feel like it is longer. People recover safely from most seizures. During a tonic-clonic seizure, the person may appear to stop breathing or turn slightly blue. This may be scary for you, but try to stay calm. Afterward, the person may be tired, confused, and achy. He or she may need to sleep for several hours to fully recover.  What to do  During any seizure, stay with the person until it is over. Note the time when the seizure starts and ends. Dont try to stop the seizure. During a tonic-clonic seizure, also do the following:  · Move hard or sharp objects out of the way.  · Lay the person on a flat surface and turn them on their  side.  · Place a flat, soft object under their head.  · Dont try to restrain the person. Both of you could get hurt.  · Dont put anything in the persons mouth. He or she cannot swallow his or her tongue, and you risk breaking his or her teeth or being bitten.  · Dont give the person medicines during a seizure, unless youve been trained by a healthcare provider.  · Speak quietly to the person as he or she recovers.  There is no need to call 911 if the patient has a well-established cause of the seizures (like epilepsy) and the seizure is very typical. If in doubt or the person's condition is unknown, call 911.  Call 911 if the seizure lasts longer than 5 minutes, there is no conscious interval between 2 seizures, or several seizures happen in a row. These events could represent status epilepticus, a medical emergency. Here are some other causes of seizures and situations that need immediate medical attention:   · If the person has diabetes  · If the person has any brain infections  · If the person has heat exhaustion  · If the person is pregnant   · Poisoning is known or suspected  · The person has low blood sugar  · A seizure happens after or during a high fever  · A head injury immediately after or within a few days after the injury happened  · Multiple seizures happen in a short period of time  · The person stops breathing  · A seizure that happens in water  · The person hit his or her head during a seizure and becomes difficult to arouse, is vomiting or complains of blurry vision  · It is the first time a seizure happens  · It is different than the typical seizures for that person  · The person is difficult to arouse after the seizure  · Alcohol or drug abuse   · Alcohol or drug withdrawal      Date Last Reviewed: 9/8/2015 © 2000-2016 [employer]. [address] [place] [address], [place] [address]. All rights reserved. This information is not intended as a substitute for professional medical care.  "Always follow your healthcare professional's instructions.        Migraine Headache: Stages and Treatment  A migraine headache tends to progress in stages. Learning these stages can help you better understand what is happening. Then you can learn ways to reduce pain and relieve other symptoms. Methods for relieving your symptoms include self-care and medicines.  Migraine stages  Migraines tend to progress through 4 stages. Many people don't have all stages, and stages may differ with each headache:  · Prodrome. A few hours to a day or so before the headache, you may feel tired, (yawning many times), uneasy, or mahmood. You may also feel bloated or crave certain foods.  · Aura. Up to an hour before the headache starts, some migraine sufferers experience aura--flashing lights, blind spots, other vision problems, confusion, difficulty speaking, or other neurologic symptoms.  · Headache. Moderate to severe pain affects one side of the head and then can spread to both sides, often along with nausea. You may be highly sensitive to light, sound, and odors. Vomiting or diarrhea may also happen. This stage lasts 4 to 72 hours.  · Postdrome. After your headache ends, you may feel tired, achy, and "washed out." This may last for a day or so.    Self-care during a migraine  Here is what you can do:  · Use a cold compress. Wrap a thin cloth around a cold pack, a cold can of soda, or a bag of frozen vegetables. Apply this to your temple or other pain site.  · Drink fluids. If nausea makes it hard to drink, try sucking on ice.  · Rest. If possible, lie down. Try not to bend over, as this may increase your pain. Sometimes laying in a dark quiet room can help the migraine from being aggravated.    · Try caffeine. Some people find that drinking fluids with caffeine, such as coffee or tea, helps to lessen migraine pain.  Using medicines  Work with your healthcare provider to find the right medicines for you. Medicines for migraine may " relieve pain (analgesics), relieve nausea, or attack the migraine's root causes (migraine-specific medicines).  Rebound headache  Taking analgesics each day, or even several times a week, may lead to more frequent and severe headaches. These are called rebound headaches. If you think you're having rebound headaches, tell your healthcare provider. He or she can help you safely decrease your medicine. Rebound caffeine withdrawal headaches can also happen.    Date Last Reviewed: 10/9/2015  © 5889-5473 [employer]. [address] [place] [address]. All rights reserved. This information is not intended as a substitute for professional medical care. Always follow your healthcare professional's instructions.        What Is Traumatic Brain Injury?  A traumatic brain injury (TBI) is a sudden jolt to your head that changes the way your brain works. The jolt could be caused by a blow to your head, a blast, or an object like a bullet or fragment entering your brain. Falls, fights, combat, sports, and motor vehicle accidents are other common causes. TBI happens more often in men than women and more often under the age of 25.    Types of TBI  A TBI can be mild, moderate, or severe. Most TBIs are mild. Some medical groups refer to a mild TBI as a concussion. Other groups view concussion as a milder subset of TBI. If you have a mild TBI, you might be knocked out for a short time, or you might just feel stunned for a while. With a moderate or severe TBI, you will be unconscious for longer. Your healthcare team will decide how serious your TBI is based on the symptoms at the time of the trauma, as well as afterward. A tool called the Brian Coma Scale (GCS) is often used to rate the severity of TBI. Symptoms of TBI are unpredictable. This means you could have a mild TBI and actually have symptoms that last longer than someone with a more severe TBI.   Types of damage  When the brain strikes the skull or twists  on the brain stem, brain tissue tears. This injury may then cause a second type of damage, such as bleeding or swelling in the brain. Healthcare providers try to control the second type of damage to help limit long-term problems.       Tearing  If nerve fibers in the brain tissue tear, signals cant pass between the brain and body. Lost signals mean changed skills or body functions. Bleeding  A torn blood vessel may leak into nearby tissue. This kills brain cells and can lead to a buildup of blood (hematoma). If this blood presses on the brain, it can cut off blood to other cells. These cells also die. Swelling  The brain has almost no room to expand inside the skull. If the brain swells, it may press against the skull. As the pressure increases, the brain functions may be changed.   Symptoms of TBI  Having a TBI can change your brain in many ways. A TBI can change the way you think, feel, act, and move. The symptoms depend on the part of your brain that is injured. Common symptoms of mild TBI can include:  · Headache  · Confusion  · Loss of consciousness   · Dizziness  · Blurry vision  · Ringing in your ears  · Feeling tired  · Loss of memory   · Mood changes  · Trouble sleeping  · Being off balance  · Being bothered by bright light  · Feeling sick to your stomach  Symptoms of moderate or severe TBI may include all the symptoms of a mild TBI, plus any or all of these symptoms:  · Extended loss of consciousness   · Severe headache that does not go away  · Repeated vomiting  · Seizures  · Extreme sleepiness  · Slurred speech  · Weakness and numbness in your arms and legs  · Being very clumsy  · Being very irritable, restless, or confused  Recovering from TBI  Most people recover completely from a mild TBI. It may take days or weeks. If you have had more than one TBI, your recovery may take longer. Everyones brain is different, so your recovery time and treatments will depend on how your brain is healing. Here are  some tips to help your recovery:  · Be honest with your healthcare team and let them know about all your symptoms.  · Let your healthcare provider know right away if your symptoms are getting worse or new symptoms appear.   · Make sure to keep all your appointments and follow your healthcare provider's instructions carefully.  · Give your brain time to heal. Be patient and get plenty of rest.  · Dont smoke, take drugs, or drink alcohol.  · Dont take any medicines without checking with your healthcare provider first. This includes over-the-counter medicines.  · Your healthcare provider might suggest that you take a new medicine if you have problems managing your emotions, such as laughing and crying uncontrollably. The medicine is a combination of dextromethorphan and quinidine.  Recovery from a TBI is unpredictable and is different from person to person. Most people do recover completely from most TBIs. Remember that a TBI can change the way you think, feel, move, and act. The best way to recover is to let your family and healthcare team know about all your symptoms. Work closely with your healthcare team and give your brain time to heal.     Date Last Reviewed: 9/16/2015  © 1475-5340 [employer]. [address] [place] [address], [place] [id number]. All rights reserved. This information is not intended as a substitute for professional medical care. Always follow your healthcare professional's instructions.        Fall Prevention  Falls often occur due to slipping, tripping or losing your balance. Millions of people fall every year and injure themselves. Here are ways to reduce your risk of falling again.  · Think about your fall, was there anything that caused your fall that can be fixed, removed, or replaced?  · Make your home safe by keeping walkways clear of objects you may trip over.  · Use non-slip pads under rugs. Do not use area rugs or small throw rugs.  · Use non-slip mats in bathtubs and  showers.  · Install handrails and lights on staircases.  · Do not walk in poorly lit areas.  · Do not stand on chairs or wobbly ladders.  · Use caution when reaching overhead or looking upward. This position can cause a loss of balance.  · Be sure your shoes fit properly, have non-slip bottoms and are in good condition.   · Wear shoes both inside and out. Avoid going barefoot or wearing slippers.  · Be cautious when going up and down stairs, curbs, and when walking on uneven sidewalks.  · If your balance is poor, consider using a cane or walker.  · If your fall was related to alcohol use, stop or limit alcohol intake.   · If your fall was related to use of sleeping medicines, talk to your doctor about this. You may need to reduce your dosage at bedtime if you awaken during the night to go to the bathroom.    · To reduce the need for nighttime bathroom trips:  ¨ Avoid drinking fluids for several hours before going to bed  ¨ Empty your bladder before going to bed  ¨ Men can keep a urinal at the bedside  · Stay as active as you can. Balance, flexibility, strength, and endurance all come from exercise. They all play a role in preventing falls. Ask your healthcare provider which types of activity are right for you.  · Get your vision checked on a regular basis.  · If you have pets, know where they are before you stand up or walk so you don't trip over them.  · Use night lights.  Date Last Reviewed: 11/5/2015  © 3993-3649 [employer]. [address] [place] [address], [place] [address]. All rights reserved. This information is not intended as a substitute for professional medical care. Always follow your healthcare professional's instructions.        Preventing Falls in the Home  As you get older, falls are more likely for many reasons. One reason is because your reaction time slows. Your muscles and joints may also get stiffer, making them less flexible and therefore more prone to injury. Illness, medicines, and  vision changes can also affect your balance. This increases your risk of falling. A fall could leave you unable to live on your own. To make your home safer, follow these tips:   Floors  Make floors safer by doing the following:   · Put nonskid pads under area rugs.  · Remove throw rugs.  · Replace worn floor coverings.  · Tack carpets firmly to each step on carpeted stairs. Put nonskid strips on the edges of uncarpeted stairs.  · Keep floors and stairs free of clutter and cords.  · Arrange furniture so there are clear pathways.  · Clean up any spills right away.  Bathrooms  Make bathrooms safer by doing the following:   ·   Install grab bars in the tub or shower.  · Apply nonskid strips or put a nonskid rubber mat in the tub or shower.  · Sit on a bath chair to bathe.  · Use bathmats with nonskid backing.  Lighting  Tips to light your way:   · Keep a flashlight in each room.  · Put a nightlight along the pathway between the bedroom and the bathroom.  Date Last Reviewed: 8/1/2016  © 9321-0757 [employer]. [address] [place], [place] [id number]. All rights reserved. This information is not intended as a substitute for professional medical care. Always follow your healthcare professional's instructions.        Preventing Falls: Are You At Risk of Falling?  As you get older, you're not as steady on your feet as you once were. And you may have health problems you didn't have when you were younger. So, it's not surprising that older people are more likely to trip and fall. Falling can be very serious. It can change your overall health and quality of life. That's why it's important to be aware of your own risk of falling.    The dangers of falling  Falls are one of the main causes of injury in people over age 65. An older person who falls may take longer to get better than a younger person. And, after a fall, an older person is more likely to have problems that don't go away. So, preventing falls can help  "you avoid serious health problems.  Are you at risk of falling?  Answer these questions to rate your level of risk.  · Are you a woman?  · Have you fallen or stumbled in the last year?  · Are you over age 65?  · Are you ever dizzy or lightheaded with standing?  · Do you have a hard time getting in and out of the bathtub or on and off the toilet?  · Do you lean on objects to help you get around? Or do you use a cane or walker?  · Do you have vision or hearing problems? For example, do you need new glasses or hearing aids?  · Do you have 2 or more long-lasting (chronic) medical conditions?  · Do you take 3 or more medicines?  · Have you felt depressed recently?  · Have you had more trouble with your memory in recent months?  · Are there hazards in your home that might cause you to fall, such as loose rugs or poor lighting?  · Do you have a pet that jumps on you or might trip you?  · Have you stopped getting regular exercise?  · Do you have diabetes?   · Do you have a neurologic disease, such as Parkinson or Alzheimer disease?   · Do you drink alcohol?  · Do you wear athletic shoes or slippers, or go barefoot at home?  You can help prevent falls  If you answered "yes" to any of the above questions, you should take steps to reduce your risk of a fall. Monitoring health conditions and keeping walkways in your home free of clutter are just 2 ways. Changing is sometimes easier said than done. But keep in mind that even small changes can make you less likely to fall.  The fear of falling  It's normal to be scared of falling, especially if you've fallen before. But being afraid can actually make you more likely to fall. This is because:  · Fear might cause you to become less active. Being less active can lead to a loss of strength and balance.  · Fear can lead to isolation from others, depression, or the use of more medicines or alcohol. And all these things make falling even more likely.  To break the cycle, learn more " about ways to avoid falling. As you take control, you may find yourself feeling less afraid.   Date Last Reviewed: 6/12/2015  © 6342-2638 [employer]. [address] [place] [address], [place] [address]. All rights reserved. This information is not intended as a substitute for professional medical care. Always follow your healthcare professional's instructions.

## 2017-07-14 ENCOUNTER — OUTPATIENT CASE MANAGEMENT (OUTPATIENT)
Dept: ADMINISTRATIVE | Facility: OTHER | Age: 52
End: 2017-07-14

## 2017-07-14 NOTE — PROGRESS NOTES
Talked to patient to update plan of care for OPCM.  Patient stated that she did receive all educational materials yesterday in the mail but has not had the chance to review at this time.  Patient stated that she has not had any falls, SOB or cough since last conversation.  Patient stated that she is taking all medications as prescribed.  Patient stated that she has not heard from pharmacy assistance to assist patient with her medications.  I will in-basket message pharmacy assistance in regards to pharmacy assistance for patient.  Patient stated that she is continuing to use a cane to assist with ambulation.  Reminded patient of upcoming appointment:  7/20 at 0900 with Coumadin clinic and verbalized understanding.  Encouraged patient to call this RN if having any questions/concerns.  Will continue to coordinate care with JADE Mueller-CAMILO for coordination of care and any community resources that would be available for patient.  Will continue to follow.  BRENT AlvarengaM-RN

## 2017-07-17 ENCOUNTER — PATIENT MESSAGE (OUTPATIENT)
Dept: NEUROLOGY | Facility: CLINIC | Age: 52
End: 2017-07-17

## 2017-07-17 DIAGNOSIS — M79.605 LEG PAIN, LEFT: ICD-10-CM

## 2017-07-17 DIAGNOSIS — R41.89 COGNITIVE IMPAIRMENT: ICD-10-CM

## 2017-07-17 DIAGNOSIS — R42 VERTIGO: ICD-10-CM

## 2017-07-17 DIAGNOSIS — G43.901 STATUS MIGRAINOSUS: ICD-10-CM

## 2017-07-17 NOTE — TELEPHONE ENCOUNTER
----- Message from Callie Raymond sent at 7/17/2017 11:59 AM CDT -----  Contact: self  bettina Fernandez would like you to call in a refill on her dementia med to [facility] at [phone].  Please call her at [phone] when done.

## 2017-07-18 RX ORDER — DONEPEZIL HYDROCHLORIDE 10 MG/1
10 TABLET, FILM COATED ORAL 2 TIMES DAILY
Qty: 180 TABLET | Refills: 3 | Status: SHIPPED | OUTPATIENT
Start: 2017-07-18

## 2017-07-20 ENCOUNTER — DOCUMENTATION ONLY (OUTPATIENT)
Dept: REHABILITATION | Facility: HOSPITAL | Age: 52
End: 2017-07-20

## 2017-07-20 ENCOUNTER — PATIENT MESSAGE (OUTPATIENT)
Dept: NEUROLOGY | Facility: CLINIC | Age: 52
End: 2017-07-20

## 2017-07-20 ENCOUNTER — PATIENT MESSAGE (OUTPATIENT)
Dept: CARDIOLOGY | Facility: CLINIC | Age: 52
End: 2017-07-20

## 2017-07-20 DIAGNOSIS — D51.8 OTHER VITAMIN B12 DEFICIENCY ANEMIA: Primary | ICD-10-CM

## 2017-07-20 RX ORDER — CYANOCOBALAMIN 1000 UG/ML
1000 INJECTION, SOLUTION INTRAMUSCULAR; SUBCUTANEOUS ONCE
Status: COMPLETED | OUTPATIENT
Start: 2017-07-20 | End: 2017-07-28

## 2017-07-20 NOTE — PROGRESS NOTES
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 Reviewed SKC, Nerve glides, HSS, Piriformis stretch to address LBP and radicular c/o.  Pain: upon last visit pt reported LBP that improved by conclusion of sessions, intermittent, varying complaints of HA were reported    OBJECTIVE     ROM:   Cervical ROM:   Not tested due to unexpected d/c    UPPER EXTREMITY--AROM/PROM  Not tested due to unexpected d/c         RANGE OF MOTION--LOWER EXTREMITIES  Not tested due to unexpected d/c    Strength: manual muscle test grades below   Upper Extremity Strength  Not tested due to unexpected d/c    Lower Extremity Strength  Not tested due to unexpected d/c       Evaluation   Single Limb Stance R LE Not tested due to unexpected d/c  (<10 sec = HIGH FALL RISK)   Single Limb Stance L LE Not tested due to unexpected d/c  (<10 sec = HIGH FALL RISK)   30 second Chair Rise NT   5 times sit-stand NT     Gait Assessment:   - AD used: SPC  - Assistance: mod I  - Distance: community  - Curb: Mod I with SPC  - Ramp:  Mod I with SPC     Evaluation   Timed Up and Go NT   Self Selected Walking Speed NT   Fast Walking Speed NT     Functional Mobility (Bed mobility,  transfers)  Bed mobility: I  Supine to sit: I  Sit to supine: I  Rolling: I  Transfers to bed: Mod I  Transfers to toilet: Mod I  Sit to stand:  Mod I  Stand pivot:  Mod I  Car transfers: Mod I  Wheelchair mobility: D  Floor transfers: NT      ASSESSMENT   52 year old female  with diagnosis of cervicogenic HA, bilateral occipital neuralgia, and anoxic BI referred to [facility] received skilled outpatient PT 1/18/2017 to 5/22/2017. Pt participated in a total of 10 sessions. Pt did not meet any goals/ goals not reassessed due to poor pt attendance with PT. Pt d/c due to poor attendance.      Status Towards Goals Met:     Short term goals:   1. Pt will perform HEP for UE/ scapular strengthening and cervical ROM/ stability with supervision to improve carryover of progress and decrease pain.  2. Pt will demonstrate improved cervical flex/ ext ROM by 10 degrees to demonstrate improved ROM to improve posture.  3. Pt will demonstrate improved cervical rotation to 50 degrees to decrease pain.   4. Pt will demonstrate improved bilateral shoulder AROM for flexion to 140 degrees to be WFLs.  5. Pt will report decreased left shoulder pain to 5/10 with AROM to improve mobility and demonstrate decreased pain.  6. Pt will demonstrate improved balance by scoring 19/30 on Functional Gait Assessment.     Long term goals:   8. Pt will report decreased HA to 3x/ week or less with no ER visits due to pain x 4 weeks.  9. Pt will demonstrate improved cervical flex/ ext AROM to 50 degrees to demonstrate functional ROM and to decrease pain.  10. Pt will demonstrate improved cervical rotation to 65 degrees to demonstrate improved mobility and decrease HA.  11. Pt will demonstrate bilateral shoulder AROM for abd to 130 degrees to be WFLs.   12. Pt will score 23/30 on Functional Gait Assessment to demonstrate improved balance and decreased fall risk.   13. Pt will demonstrate gait velocity WFLs for diagnosis by ambulating 1.24  m/s.  14. Pt will demonstrate improved balance and decreased fall risk by scoring 22/      Goals Not achieved and why: no goals met due to poor attendance with PT    Discharge reason : Non-Compliance with attendance    PLAN   This patient is discharged from [facility] Therapy Services.         Mariel Miguel, PT  07/20/2017

## 2017-07-21 ENCOUNTER — OUTPATIENT CASE MANAGEMENT (OUTPATIENT)
Dept: ADMINISTRATIVE | Facility: OTHER | Age: 52
End: 2017-07-21

## 2017-07-21 NOTE — PROGRESS NOTES
Patient referred by OPCM RODRIGO Barnard for psychosocial support.  Chart reviewed and [facility] SW Assessment completed telephonically with patient.  Patient is a 52 year-old female who lives in her cousin's apartment with her 30 year-old daughter and her child, and patient's10 year-old daughter.  Patient's problem list includes: cognitive deficits, HA, DVT, HTN, HLD, GERD, Pacemaker, hx of CVA (2013) and impaired functional mobility.  Patient said she also has been dx with Schizophrenia and Bipolar D/O.  She is seen in psychiatry by Dr. Barr, and she has an upcoming appointment with Austin Guillaume.  Patient reports that she is compliant with medication.  She denies S/I/H/I.  PHQ-2 was performed by OPCM RODRIGO Barnard and the score was 2 per Epic note.  Patient reports that she has been awarded $280 in food stamps which will begin next month.  She also receives commodity food.  Patient reports that she would like her own apartment and has her name on several waiting lists for housing.  Patient states that she has financial needs at this time because her income is only $781/monthly.  She said her 10 year-old will be starting parochial school son and has some supply needs for school.  Patient said she will apply with [employer] for assistance.  Patient said her daughter is enrolled in the [employer] and school food programs.  Patient said her older daughter works, and her minor child receives $32 in SSI.  Patient is enrolled in the [employer] transportation program.  She said she does not like to use the Medicaid transit program because they are unreliable.    Resources discussed and mailed to patient:  [facility] Housing List for families and disabled persons; Community Choices Waiver; [employer] and Support Groups; [facility] Resource Guide; Community Financial Assistance Programs; Basic Needs Programs; [facility] Crisis Intervention Hotline.    Interventions:  See POC    Follow-up plan:  Ensure that resource  information has been received by patient in the mail  Review/provide guidance and support in applying to appropriate resource programs as needed.  Enocourage medical/mental health appointment compliance  Encourage medication compliance.

## 2017-07-21 NOTE — PROGRESS NOTES
"Talked to patient to update plan of care for OPCM.  Patient stated that she has read all educational materials at this time and has no questions at this time.  Patient stated that she has no falls, SOB or cough since last conversation.  Patient stated that "I am very tired right now."  Patient stated that she is getting a Vit B-12 injection with Dr. Cortez's nurse on 7/24/2017.  Patient stated that she was newly rewarded 280.00/month for food stamps effective 8/2017.  Patient stated that she is following with Mental Health with [facility] and her next appointment is scheduled for 8/9/2017 at 0900.  Patient stated that she has no one from pharmacy assistance has contacted patient.  I will in- basket message pharmacy assistance in regards to pharmacy assistance.  Encouraged patient to call this RN if having any questions/concerns.  Will continue to coordinate care with JADE Mueller-CAMILO for coordination of care.  Will continue to follow.  BRENT AlvarengaM-RN  "

## 2017-07-22 ENCOUNTER — OUTPATIENT CASE MANAGEMENT (OUTPATIENT)
Dept: ADMINISTRATIVE | Facility: OTHER | Age: 52
End: 2017-07-22

## 2017-07-22 NOTE — PROGRESS NOTES
Please note an Outpatient Complex Care Management welcome packet and consent form was created and mailed to the patient on 7/21/17.    Please contact [facility] at [phone] with any questions.    Thank you,    Annemarie Wang, SSC

## 2017-07-22 NOTE — LETTER
July 22, 2017    Amna Chawla  [address]  [facility] [address]             Outpatient Case Management  [address]  [facility] [address] Dear Amna Chawla:    We understand that receiving many services from different doctors and healthcare providers is overwhelming. There are appointments to make, transportation to arrange, dietary instructions to understand, and new medications to obtain.    This is where [facility] can help.     You are eligible to receive Outpatient Case Management services when you have healthcare needs that require the coordination of many providers, treatments, and services. You also qualify if you need assistance with a new treatment plan.     There is no charge for this support. You may have been referred to this program from your doctor(s), hospital staff member(s), or insurance company but you always have a choice to participate or not participate. To participate, you must give us your permission to be enrolled.     When you are enrolled in the [facility] program, the  who is assigned to you is    Vianey Barnard RN  [phone]    Depending on your needs and wishes, your  may speak with you by phone, visit you at your place of living (for example your home, skilled nursing facility, or rehabilitation facility), or meet you at your doctors office.     Your  will tell you why you have been selected to participate in the program and will complete an assessment of your needs. Then a personalized plan of care will be developed with you and or your caregiver.             Here are examples of the services your [facility]  provides.     Coordinate communication among multiple providers.   Arrange for transportation, doctors visits, durable medical equipment, home care services, and special clinics.    Provide coaching on how to manage your health condition.     Answer questions about your health condition.   Help you understand your doctors treatment plan.    Provide additional instruction about your health condition, treatments, and medications.    Help you obtain information about your insurance coverage.    Advocate for your individual needs.    Request a Licensed Clinical  (LCSW) to visit you if you need their services. LCSWs help with long term planning (discussing placement options, advanced planning directives), financial planning, and assistance (for example rent, utilities, medication funding).     Your  will coordinate their activities with other outpatient services you are receiving. All services provided by [facility]  are coordinated with and communicated to your primary care physician.    Our goal is to help you manage your health condition(s) safely within your living environment, whether that is your home or a medical facility. We want to help you function at the healthiest and highest level possible.     Sincerely,      Raul Paulson MD  Medical Director    Enclosures:    Frequently Asked Questions  Patient Rights and Responsibilities   Reporting a Grievance or Complaint  Consent/Release of Information  Stamped Addressed Envelope                  Frequently Asked Questions about [facility]    What is [facility]?  Outpatient Case management is not Home healthcare services. [facility]  do not provide hands-on care. [facility]  will work with your doctor to arrange for home health services, if needed. Home health services have a limited duration and there are some restrictions as to who can get these services. There is no prescribed limit to the amount of time you receive [facility] services. [facility]  are not agents of your insurance company. However, [facility]  can help you obtain information from your insurance company.     Who are the [facility] ?  [facility]  are Registered Nurses and Social Workers. It is important to remember that you and your  are a team that works together with your primary care physician to create your individualized plan of care. The ultimate goal of your care plan is to help you implement your doctors treatment plan and to help you function at the highest level of health possible.     What are my rights as a patient?  It is important for you to know and understand your rights and responsibilities while receiving services from the [facility] program. We have enclosed a complete description of your rights and responsibilities. You can help to make your care more effective when you understand your right and responsibilities.     What is needed to be enrolled in the program?  You are only enrolled in the [facility] Program when you give us your consent to participate. You will find enclosed a consent form. You are receiving this letter because you or your caregivers have given us a verbal consent to enroll you in [facility] Program. We ask that you sign and return the enclosed written consent in the stamped self-addressed envelope.                           Patient Rights and Responsibilities    We consider you a partner in your care. When you are well informed, participate in treatment decisions and communicate openly with your doctor and other healthcare professionals, you help make your care more effective.     While you are in the Outpatient Case Management Program, your rights include the following:     You have a right to be provided services in a non-discriminatory manner in accordance with the provisions of Title VI of the Civil Rights Act of 1964, Section 504 of the Rehabilitation Act of  1973, the Age Discrimination Act of 1975, the Americans with Disabilities Act as well as any other applicable Federal and State laws and regulations.     You have the right to a reasonable, timely response to your request or need for care, as well as the right to considerate and respectful care including an environment that preserves dignity and contributes to a positive self-image. You are responsible for being considerate and respectful of our staff.     You have a right to information regarding patient rights, advocacy services and complaint mechanisms, and the right to prompt resolution of any complaint. You or a designee has the right to participate in the resolution of ethical issues surrounding your care. You have a right to file a complaint if you feel that your rights have been infringed, without fear or penalty from [facility] or the federal government. You may file a complaint with the Director of Outpatient Case Management by calling [phone]. At any time, you may lodge a grievance with the [facility] and [facility] by calling [phone], or the Joint Commission on Accreditation of Healthcare Organizations at [phone].     You, or someone acting on your behalf, have the right to understandable information on your health status, treatment and progress in order to make decisions. You have the right to know the nature, risks and alternatives to treatment. You have the right to be informed, when appropriate, regarding the outcome of the care that has been provided. You have the right to refuse treatment to the extent permitted by law, and the right to be informed of the alternatives and consequences of refusing treatment.     You, in collaboration with your physician, have the right to make decisions regarding care and the right to participate in the development and implementation of the plan of care and effective pain management. You have the right to know the name and  professional status of those responsible for the delivery of your care and treatment.       You have a right, within legal guidelines, to have a guardian, next-of-kin or legal designee exercises your patient rights when you are unable to do so. You have the right for your wishes regarding end-of-life decisions to be addressed by the healthcare team through advance directives. You have the right to personal privacy and confidentiality and to expect confidentiality of all records and communications pertaining to your care.      You have the right to receive communications about your health information confidentially. You have the right to request restrictions on the uses and disclosures of your health information. You have the right to inspect, copy, request amendments and receive an accounting of to whom we have disclosed your health information.     You have the right to be provided with interpretation services if you do not speak English; to alternative communication techniques if you are hearing or vision impaired; and to have any other resources needed on your behalf to ensure effective communication. These services are provided free of charge.     You have a right to personal safety (free from mental, physical, sexual and verbal abuse, neglect and exploitation). You have the right to access protective and advocacy services.     Advance Directives  A Patient Advocate is available to meet with patients to answer questions regarding advance directives.    Living Will  A document that outlines what medical treatment the patient does or does not want in the event the patient becomes unable to make those decisions at the appropriate time.    Durable Medical Power of   A document by which the patient designates an individual to be responsible for making medical decisions in the event the patient becomes unable to do so.    HIPAA Notice of Privacy Practices  Your medical information is governed by federal  privacy laws. HIPAA protects private medical information and how that information is disclosed. If you have a question regarding the HIPAA Notice of Privacy Practices, or if you believe your privacy rights have been violated, you may call our designated hotline at ([phone].            Quality Improvement  Because we consistently strive to improve the care and service provided to our patients, we welcome your feedback. Your comments are an important part of our quality improvement process, as we like to know what we are doing right and which areas are in need of improvement. Our policy is to listen, be responsive and provide you with an appropriate and timely follow-up to your questions or concerns. Our goal is active patient and family involvement in all aspects of the care process.          Reporting a Grievance or Complaint    During your time with the [facility] team you may have a grievance or complaint with our services. Your Patients Bill of Rights gives patients, families, and caregivers the right to express concerns and grievances and the right to expect a reasonable and timely response.     Your presentation of your concerns is not viewed negatively. It is an opportunity for us to improve the quality of our care and services we provide to you.     You may report your concerns directly to your , or you can phone in a complaint to:     Director of Outpatient Case Management  [phone]    You may also send a complaint letter to:    Director of Outpatient Case Management Services  [address] [place] [id number]    Tell us the details of your complaint and provide us with a contact phone number so we can contact you to obtain additional information. We will return a call to you within two business days of our receipt of your complaint, and to request additional information as needed. If you choose to mail a letter, your complaint may take a few days  longer to reach us.     All grievances will be addressed as quickly as possible. A grievance or complaint that involves situations or practices which place patients in immediate danger will be addressed as an urgent matter. We will work to resolve all other complaints within seven days of receipt. By that time, you will receive a phone call with either the resolution of your complaint, or a plan for corrective action. A formal written response will be sent to you within 30 days of receipt of your grievance.     If a resolution cannot be completed within 30 days, a letter will be sent to you or your family member with an estimated time for the final response.    Additionally, all patients have the right to file complaints with external agencies, without exception. Complaints/grievances can be addressed to the following agencies:            Patient Safety or Quality of Care Concerns  Office of Quality Monitoring   [facility] [place] [address]  [phone] Toll Free    HIPPA Privacy/Security Concerns  Office for Civil Rights Region IV  [place] Department of Health & Human Services  [address] [place] [address], [address]  [place] [address] [phone] Phone  [phone] TDD  [phone] Toll Free    Medicare/Medicaid Billing Concerns  [place] for Medicare & Medicaid Services  Region [address]  [address] [place] [address], [address]  [place] [address] [phone] Phone  [phone] Toll Free    General Concerns  [facility] and [facility]"" ([facility])  ([phone] Toll Free Complaint Hotline                                        Consent Form/Release of Information    By signing--     (1) I agree I have read the Outpatient Case Management information provided to me;     (2) I agree to voluntarily participate in the Outpatient Case Management program;     (3) I understand I must consent to participation in the Outpatient Case Management program during my first interview  with my ;    (4) I consent to the discussion and release of my personal health information to my healthcare team (including my personal physician, my medical home care team, any specialty physician(s), and my [facility] team);     (5) I agree my consent is valid for the length of time I am receiving Outpatient Case Management;    (6) I agree to referrals to community resources which my Case Management team recommends for me. I agree to the release of my personal information and personal health information as necessary to referral sources.    ___________________________________________________________________  Patients Printed Name     ___________________________________________________________________  Patients Signature       Date    If patient is in being cared for, please complete this section:     ___________________________________________________________________  Printed Name of Person Caring For Patient   Relationship To Patient    ___________________________________________________________________   Signature of Person Caring For Patient     Date    PLEASE SIGN AND RETURN IN THE ENCLOSED PRE-ADDRESSED ENVELOPE.

## 2017-07-24 ENCOUNTER — PATIENT MESSAGE (OUTPATIENT)
Dept: NEUROLOGY | Facility: CLINIC | Age: 52
End: 2017-07-24

## 2017-07-24 ENCOUNTER — OUTPATIENT CASE MANAGEMENT (OUTPATIENT)
Dept: ADMINISTRATIVE | Facility: OTHER | Age: 52
End: 2017-07-24

## 2017-07-24 ENCOUNTER — TELEPHONE (OUTPATIENT)
Dept: NEUROLOGY | Facility: CLINIC | Age: 52
End: 2017-07-24

## 2017-07-27 ENCOUNTER — PATIENT MESSAGE (OUTPATIENT)
Dept: NEUROLOGY | Facility: CLINIC | Age: 52
End: 2017-07-27

## 2017-07-28 ENCOUNTER — OUTPATIENT CASE MANAGEMENT (OUTPATIENT)
Dept: ADMINISTRATIVE | Facility: OTHER | Age: 52
End: 2017-07-28

## 2017-07-28 ENCOUNTER — ANTI-COAG VISIT (OUTPATIENT)
Dept: CARDIOLOGY | Facility: CLINIC | Age: 52
End: 2017-07-28
Payer: MEDICARE

## 2017-07-28 ENCOUNTER — CLINICAL SUPPORT (OUTPATIENT)
Dept: NEUROLOGY | Facility: CLINIC | Age: 52
End: 2017-07-28
Payer: MEDICARE

## 2017-07-28 DIAGNOSIS — Z79.01 LONG TERM (CURRENT) USE OF ANTICOAGULANTS: Primary | ICD-10-CM

## 2017-07-28 DIAGNOSIS — I48.0 PAROXYSMAL ATRIAL FIBRILLATION: ICD-10-CM

## 2017-07-28 LAB — INR PPP: 2.5 (ref 2–3)

## 2017-07-28 PROCEDURE — 96372 THER/PROPH/DIAG INJ SC/IM: CPT | Mod: PBBFAC | Performed by: PSYCHIATRY & NEUROLOGY

## 2017-07-28 RX ADMIN — CYANOCOBALAMIN 1000 MCG: 1000 INJECTION, SOLUTION INTRAMUSCULAR at 01:07

## 2017-07-28 NOTE — PROGRESS NOTES
INR good. No changes in medications, health, or diet. No signs or symptoms of bleeding. INR stable. Maintain current dose and repeat INR in 4 weeks.

## 2017-07-28 NOTE — NURSING
Pt seen in clinic. Per Dr. Cortez orders, vitamin b 1000mcg in Right Deltoid. Patient identifiers x 2 noted. Allergies reviewed. Site is clear without bleeding or redness. bandaid applied. Pt remained in clinic 15 minutes without any untoward reactions noted

## 2017-07-28 NOTE — PROGRESS NOTES
Attempted to update plan of care for OPCM; unable to reach and leave voice message.  SHIRLEY Barnard, OPCM-RN

## 2017-07-30 ENCOUNTER — NURSE TRIAGE (OUTPATIENT)
Dept: ADMINISTRATIVE | Facility: CLINIC | Age: 52
End: 2017-07-30

## 2017-07-30 NOTE — TELEPHONE ENCOUNTER
"  Reason for Disposition   Mild weakness or fatigue with acute minor illness (e.g., colds) (all triage questions negative)    Answer Assessment - Initial Assessment Questions  1. DESCRIPTION: "Describe how you are feeling."   generalized weakness, nausea  2. SEVERITY: "How bad is it?"  "Can you stand and walk?"    - MILD - Feels weak or tired, but does not interfere with work, school or normal activities    - MODERATE - Able to stand and walk; weakness interferes with work, school, or normal activities    - SEVERE - Unable to stand or walk      mild  3. ONSET:  "When did the weakness begin?"      yesterday  4. CAUSE: "What do you think is causing the weakness?"    b 12 shot  5. OTHER SYMPTOMS: "Do you have any other symptoms?" (e.g., chest pain, fever, cough, SOB, vomiting, diarrhea, bleeding)     nausea  6. PREGNANCY: "Is there any chance you are pregnant?" "When was your last menstrual period?"  n/a    Protocols used: ST WEAKNESS (GENERALIZED) AND FATIGUE-A-AH    "

## 2017-08-01 ENCOUNTER — TELEPHONE (OUTPATIENT)
Dept: ELECTROPHYSIOLOGY | Facility: CLINIC | Age: 52
End: 2017-08-01

## 2017-08-01 NOTE — TELEPHONE ENCOUNTER
"Spoke to pt who states it was just gas. She denies any current symptoms. Encouraged to call back with any questions or concerns.    ----- Message from Cher Clark RN sent at 8/1/2017  8:24 AM CDT -----  Regarding: FW: a Dr. Mejia pt      ----- Message -----  From: Eliezer Gonzalez  Sent: 7/31/2017   3:32 PM  To: Cher Clark RN  Subject: a Dr. Mejia pt                                  Patient contacted the clinic with c/o having a feeling "like when you eat you food and it sits on your chest and/or when you feel like you have to take a good belch".  Patient stated her PCP is @ [facility] and he is not in today and she was instructed to contact either her, "Pacemaker doctor or her Cardiologist".    Please advise.     "

## 2017-08-02 ENCOUNTER — TELEPHONE (OUTPATIENT)
Dept: NEUROLOGY | Facility: CLINIC | Age: 52
End: 2017-08-02

## 2017-08-02 NOTE — TELEPHONE ENCOUNTER
Called and left message for Patient as she was not at home to answer phone.Informed her that infusion closes at 5 and if her pain is severe to go to walk in clinic for injection or to the ED for evaluation.

## 2017-08-02 NOTE — TELEPHONE ENCOUNTER
----- Message from Cici Hill sent at 8/2/2017  2:39 PM CDT -----  Contact: self @ [phone]  Pt is calling to see if you can get her into the infusion center.  pls call asap.

## 2017-08-04 ENCOUNTER — OUTPATIENT CASE MANAGEMENT (OUTPATIENT)
Dept: ADMINISTRATIVE | Facility: OTHER | Age: 52
End: 2017-08-04

## 2017-08-04 NOTE — PROGRESS NOTES
Attempted to update plan of care for OPCM; patient stated that she will call this RN back at a later date and time.  SHIRLEY Barnard, OPCM-RN

## 2017-08-07 ENCOUNTER — OUTPATIENT CASE MANAGEMENT (OUTPATIENT)
Dept: ADMINISTRATIVE | Facility: OTHER | Age: 52
End: 2017-08-07

## 2017-08-07 NOTE — PROGRESS NOTES
Chart reviewed.  Telephonic follow-up with patient who said she has received the information and has contacted some of the agencies.  Patient said she was having a difficulty time focusing presently because she has been getting migraine headaches.  She said she had written down questions to ask about the resource information but could not look for the table because she was no feeling well.  Patient requested a call back next week to discuss the resources.  LCSW will follow-up.    Resources previously discussed and mailed to patient:  [facility] Housing List for families and disabled persons; Community Choices Waiver; Head-Spinal Cord Injury Trust Fund and Support Groups; [facility] Resource Guide; Community Financial Assistance Programs; Basic Needs Programs; [facility] Crisis Intervention Hotline.     Interventions:  See POC     Follow-up plan:  Review/provide guidance and support in applying to appropriate resource programs as needed.  Enocourage medical/mental health appointment compliance  Encourage medication compliance.

## 2017-08-08 ENCOUNTER — TELEPHONE (OUTPATIENT)
Dept: SLEEP MEDICINE | Facility: CLINIC | Age: 52
End: 2017-08-08

## 2017-08-08 NOTE — TELEPHONE ENCOUNTER
----- Message from Chito Castillo sent at 8/8/2017 11:04 AM CDT -----  Contact: Patient  x_  1st Request  _  2nd Request  _  3rd Request        Who:  PUSHPA KEY [[id number]]    Why: Requesting a call back in regards to inquiring where to obtain CPAP.  Please return the call at earliest convenience.   Thanks.     What Number to Call Back:[phone]    When to Expect a call back: (With in 24 hours)

## 2017-08-08 NOTE — TELEPHONE ENCOUNTER
Pt doesn't want to use [facility] for CPAP. She cannot get to [facility] to  machine. She also notes that whomever was calling her to set up CPAP had a poor attitude towards her and she prefers to have her CPAP order sent elsewhere.

## 2017-08-09 ENCOUNTER — OFFICE VISIT (OUTPATIENT)
Dept: PSYCHIATRY | Facility: CLINIC | Age: 52
End: 2017-08-09
Payer: MEDICARE

## 2017-08-09 VITALS
DIASTOLIC BLOOD PRESSURE: 90 MMHG | BODY MASS INDEX: 45.24 KG/M2 | HEIGHT: 64 IN | SYSTOLIC BLOOD PRESSURE: 171 MMHG | WEIGHT: 265 LBS | HEART RATE: 77 BPM

## 2017-08-09 DIAGNOSIS — F41.9 ANXIETY: Primary | ICD-10-CM

## 2017-08-09 DIAGNOSIS — F33.1 DEPRESSION, MAJOR, RECURRENT, MODERATE: ICD-10-CM

## 2017-08-09 DIAGNOSIS — F33.1 MAJOR DEPRESSIVE DISORDER, RECURRENT EPISODE, MODERATE: Primary | ICD-10-CM

## 2017-08-09 DIAGNOSIS — G47.00 INSOMNIA, UNSPECIFIED TYPE: ICD-10-CM

## 2017-08-09 PROCEDURE — 90834 PSYTX W PT 45 MINUTES: CPT | Mod: S$PBB,,, | Performed by: SOCIAL WORKER

## 2017-08-09 PROCEDURE — 3008F BODY MASS INDEX DOCD: CPT | Mod: ,,, | Performed by: NURSE PRACTITIONER

## 2017-08-09 PROCEDURE — 99999 PR PBB SHADOW E&M-EST. PATIENT-LVL III: CPT | Mod: PBBFAC,,, | Performed by: NURSE PRACTITIONER

## 2017-08-09 PROCEDURE — 3077F SYST BP >= 140 MM HG: CPT | Mod: ,,, | Performed by: NURSE PRACTITIONER

## 2017-08-09 PROCEDURE — 99214 OFFICE O/P EST MOD 30 MIN: CPT | Mod: S$PBB,,, | Performed by: NURSE PRACTITIONER

## 2017-08-09 PROCEDURE — 99999 PR PBB SHADOW E&M-EST. PATIENT-LVL I: CPT | Mod: PBBFAC,,, | Performed by: SOCIAL WORKER

## 2017-08-09 PROCEDURE — 3078F DIAST BP <80 MM HG: CPT | Mod: ,,, | Performed by: NURSE PRACTITIONER

## 2017-08-09 PROCEDURE — 99213 OFFICE O/P EST LOW 20 MIN: CPT | Mod: PBBFAC,27 | Performed by: NURSE PRACTITIONER

## 2017-08-09 RX ORDER — CLONAZEPAM 0.5 MG/1
0.5 TABLET ORAL DAILY PRN
Qty: 30 TABLET | Refills: 3 | Status: SHIPPED | OUTPATIENT
Start: 2017-08-09 | End: 2017-10-04 | Stop reason: SDUPTHER

## 2017-08-09 RX ORDER — ZOLPIDEM TARTRATE 10 MG/1
10 TABLET ORAL NIGHTLY PRN
Qty: 30 TABLET | Refills: 5 | Status: SHIPPED | OUTPATIENT
Start: 2017-08-09 | End: 2017-10-04 | Stop reason: SDUPTHER

## 2017-08-09 RX ORDER — ARIPIPRAZOLE 5 MG/1
5 TABLET ORAL NIGHTLY
Qty: 30 TABLET | Refills: 5 | Status: SHIPPED | OUTPATIENT
Start: 2017-08-09 | End: 2017-10-04 | Stop reason: SDUPTHER

## 2017-08-09 RX ORDER — DULOXETIN HYDROCHLORIDE 60 MG/1
60 CAPSULE, DELAYED RELEASE ORAL 2 TIMES DAILY
Qty: 60 CAPSULE | Refills: 5 | Status: SHIPPED | OUTPATIENT
Start: 2017-08-09 | End: 2017-09-07

## 2017-08-09 NOTE — PROGRESS NOTES
"Outpatient Psychiatry Follow-Up Visit (MD/NP)    8/9/2017    Clinical Status of Patient:  Outpatient (Ambulatory)    Chief Complaint:  Amna Chawla is a 52 y.o. female who presents today for follow-up of depression and anxiety.  Met with patient for the first time to transition care. Pt's last visit was with Dr. Childers on 5/03/17.  Chart reviewed.     Interval History and Content of Current Session:  Interim Events/Subjective Report/Content of Current Session:     Pt presented after psychotherapy visit with Dr. Rosales.  Chart reviewed.     Current Medication Profile for Psych  · Abilify 5mg po q day (pt failed on 10 mg due to oversedation)  · Cymbalta 60mg po bid  · Ambien 10mg po q hs    Ativan 0.5mg po q 6 hours from Dr. Fragoso.  Also taking Neurontin, Depakote, Lamictal.   Gabatrol PRN for migraines    Pt reports that the Abilify was too sedating at 10 mg.  Discussed current psychosocial stressors related to her son-in-law.  Pt discussed family dynamic conflicts.  Pt discussed passive homicidal thoughts directed at him on 2 occasions which she described as a "voice telling me to stab him".  Pt stated that she removed herself from the situation to avoid acting on her urges.  Description of the events appear to be consistent with an intrusive thought pattern and not associated with auditory hallucinations. Pt denies any current homicidal/suicidal ideations.  Pt was able to process her thoughts related to the event during our interview. Endorses anxiety symptoms that are situational.  Explored adaptive ways to cope.  Counseled on setting limits in her family relationships and safety planning.  Discussed cognitive therapy principles to alter and empower her perception of family relationships.  Pt agreed that she should focus on her 1 year old grandson, Nadir, and not dwell on her negative attitude towards his father.          Review of Systems   GENERAL: No weight gain or loss   SKIN: No rashes or lacerations " "  HEAD: No headaches   EYES: No exophthalmos, jaundice or blindness   EARS: No dizziness, tinnitus or hearing loss   NOSE: No changes in smell   MOUTH & THROAT: No dyskinetic movements or obvious goiter   CHEST: No shortness of breath, hyperventilation or cough   CARDIOVASCULAR: No tachycardia or chest pain   ABDOMEN: No nausea, vomiting, pain, constipation or diarrhea   URINARY: No frequency, dysuria or sexual dysfunction   ENDOCRINE: No polydipsia, polyuria   MUSCULOSKELETAL: as above, walks with cane  NEUROLOGIC: + weakness, no sensory changes, seizures, confusion, memory loss, tremor or other abnormal movements      Psychiatric Review Of Systems:  sleep: yes  appetite changes: no  weight changes: no  energy/anergy: no  interest/pleasure/anhedonia: no  somatic symptoms: yes  libido: no  anxiety/panic: yes      Past Medical, Family and Social History: The patient's past medical, family and social history have been reviewed and updated as appropriate within the electronic medical record - see encounter notes.    Compliance: yes    Side effects: somnolence    Risk Parameters:  Patient reports no suicidal ideation  Patient reports homicidal ideation: see note  Patient reports no self-injurious behavior  Patient reports no violent behavior    Exam (detailed: at least 9 elements; comprehensive: all 15 elements)   Constitutional  Vitals:  Most recent vital signs, dated greater than 90 days prior to this appointment, were reviewed.   Vitals:    08/09/17 0855 08/09/17 1008   BP: (!) 176/62 (!) 171/90   Pulse: 72 77   Weight: 120.2 kg (265 lb)    Height: 5' 4" (1.626 m)         General:  unremarkable, age appropriate, casually dressed, neatly groomed     Musculoskeletal  Muscle Strength/Tone:  no dyskinesia, no dystonia, no tremor, no tic   Gait & Station:  slow, uses cane     Psychiatric  Speech:  soft   Mood & Affect:  anxious, depressed, irritable, sad  blunted   Thought Process:  normal and logical   Associations:  " intact   Thought Content:  normal, no suicidality, no homicidality, delusions, or paranoia   Insight:  has awareness of illness   Judgement: behavior is adequate to circumstances   Orientation:  grossly intact   Memory: intact for content of interview, able to remember recent events- yes, able to remember remote events- yes   Language: grossly intact   Attention Span & Concentration:  able to focus   Fund of Knowledge:  intact and appropriate to age and level of education, familiar with aspects of current personal life     Assessment and Diagnosis   Status/Progress: Based on the examination today, the patient's problem(s) is/are failing to respond as expected to treatment.  New problems have not been presented today.   Co-morbidities and Lack of compliance are complicating management of the primary condition.  There are no active rule-out diagnoses for this patient at this time.     General Impression:       ICD-10-CM ICD-9-CM   1. Anxiety F41.9 300.00   2. Depression, major, recurrent, moderate F33.1 296.32   3. Insomnia, unspecified type G47.00 780.52     R/O Personality Disorder    Intervention/Counseling/Treatment Plan   · Medication Management: The risks and benefits of medication were discussed with the patient.   · Cymbalta 60mg BID  · Abilify 5mg po every evening  · Ambien 10mg po q hs/prn insomnia  · Start Klonopin 0.5 mg po daily/prn anxiety/agitation  · Continue therapy with Dr. Rosales  · Counseled on principles of CBT to alter negative thought patterns and temper anxiety symptoms.      Return to Clinic: 3 months     Total time: 50 minutes with over 50% of time spent counseling and/or coordinating care.

## 2017-08-09 NOTE — PROGRESS NOTES
Individual Psychotherapy (PhD/LCSW)    8/9/2017    Site:  [facility]         Therapeutic Intervention: Met with patient.  Outpatient - Insight oriented psychotherapy 45 min - CPT code 85110    Chief complaint/reason for encounter: depression     Interval history and content of current session: Pt has not been seen by me since Feb of 2016.  This is her third visit with me.  She has missed a number of visits in the past year due to transportation problems.  She states she and her 10 year old daughter are currently living with sister and her .  Pt is applying for housing for the disabled but there are long waiting lists.  She wants her own space to live in.  She has a 30 year old daughter with a 1 year old son who she visits often.  The daughter is engaged to  a man in his 50s who has 3 teen ager children.  They don't respect pt or her daughter and pt does not like this man and gets upset with him every time she is at their house.   She states she has heard voices in her head telling her to stab him or choke him.  She recognizes that this is not appropriate and isolates herself from him when she has these thoughts.   Still it is disturbing for her to know her daughter is going to  this man she dislikes so much.  Pt also reports depressive episodes for 1-2 days at a time where she does not feel like getting out of bed and cries for no reason she is aware of.  She complains of not sleeping but a few hours a night and this is difficult for her.  She has difficulty concentrating and tries to read but feels she loses interest quickly.  She will be seeing new NP after me to get her medications straightened out.    Treatment plan:  · Target symptoms: depression  · Why chosen therapy is appropriate versus another modality: relevant to diagnosis  · Outcome monitoring methods: self-report, observation  · Therapeutic intervention type: insight oriented psychotherapy, supportive psychotherapy    Risk  parameters:  Patient reports no suicidal ideation  Patient reports no homicidal ideation  Patient reports no self-injurious behavior  Patient reports no violent behavior    Verbal deficits: None    Patient's response to intervention:  The patient's response to intervention is motivated.    Progress toward goals and other mental status changes:  The patient's progress toward goals is fair .    Diagnosis:     ICD-10-CM ICD-9-CM   1. Major depressive disorder, recurrent episode, moderate F33.1 296.32       Plan:  individual psychotherapy, group psychotherapy and medication management by physician    Return to clinic: 1 month

## 2017-08-10 ENCOUNTER — PROCEDURE VISIT (OUTPATIENT)
Dept: NEUROLOGY | Facility: CLINIC | Age: 52
End: 2017-08-10
Payer: MEDICARE

## 2017-08-10 ENCOUNTER — TELEPHONE (OUTPATIENT)
Dept: NEUROLOGY | Facility: CLINIC | Age: 52
End: 2017-08-10

## 2017-08-10 VITALS
HEIGHT: 64 IN | HEART RATE: 81 BPM | WEIGHT: 268.31 LBS | BODY MASS INDEX: 45.81 KG/M2 | DIASTOLIC BLOOD PRESSURE: 73 MMHG | SYSTOLIC BLOOD PRESSURE: 134 MMHG

## 2017-08-10 DIAGNOSIS — M54.2 CERVICALGIA: ICD-10-CM

## 2017-08-10 DIAGNOSIS — S09.90XS CHI (CLOSED HEAD INJURY), SEQUELA: ICD-10-CM

## 2017-08-10 DIAGNOSIS — G43.711 CHRONIC MIGRAINE WITHOUT AURA, WITH INTRACTABLE MIGRAINE, SO STATED, WITH STATUS MIGRAINOSUS: Primary | ICD-10-CM

## 2017-08-10 PROCEDURE — 64615 CHEMODENERV MUSC MIGRAINE: CPT | Mod: S$PBB,,, | Performed by: PSYCHIATRY & NEUROLOGY

## 2017-08-10 PROCEDURE — 64615 CHEMODENERV MUSC MIGRAINE: CPT | Mod: PBBFAC | Performed by: PSYCHIATRY & NEUROLOGY

## 2017-08-10 RX ORDER — TOPIRAMATE 100 MG/1
200 TABLET, FILM COATED ORAL 2 TIMES DAILY
Qty: 120 TABLET | Refills: 12 | Status: ON HOLD | OUTPATIENT
Start: 2017-08-10 | End: 2018-09-30 | Stop reason: HOSPADM

## 2017-08-10 RX ORDER — OXYCODONE AND ACETAMINOPHEN 10; 325 MG/1; MG/1
1 TABLET ORAL EVERY 8 HOURS PRN
Qty: 30 TABLET | Refills: 0 | Status: SHIPPED | OUTPATIENT
Start: 2017-08-10 | End: 2017-09-07

## 2017-08-10 RX ADMIN — ONABOTULINUMTOXINA 200 UNITS: 100 INJECTION, POWDER, LYOPHILIZED, FOR SOLUTION INTRADERMAL; INTRAMUSCULAR at 02:08

## 2017-08-10 NOTE — PROCEDURES
Follow up:   2 weeks ago BOTOX effect wore off   Has severe spasm and pain in the traps catalina   Weather change has provoked severe migraine + nausea   She started coumadin        Prior note:   3 weeks ago BOTOX effect wore off   She reports severe daily HA    During BOTOX periods she feels she  her HA. Much less intense      Prior note:   The patient is a 51-year-old female who [facility]. Since her last visit, she reports growing frustration   about the fact that nothing has helped her with regards to her headaches. She   continues to experience daily headaches. She takes mefenamic acid and   tizanidine every night, which only provides her two hours of relief. She is   unable to sleep because of the refractory headaches. She is incapacitated   because of severe headaches. She reports minimal relief with infusions that she  gets on a periodic basis. She does report an improvement overall with the   Botox. However, this effect has worn off in the last two weeks. She also   reports an episode wherein she fell with loss of consciousness, which was not   provoked. As a result, she injured her ankle and is currently wearing a boot.      Prior note:   since last week - Reports vertigo for 6 - 7 minutes upto 3 times daily   Nearly daily severe HA - no response to ponstel , compazine   She is late for her BOTOX - last 2 weeks were painful       Follow up:   R sided Headache is constant max at TMJ on R   Prior note:   She reports new episodes of R face tingling. Sh also reports 2 episodes of blurred vision lasting 15 minutes. These hapended while watching TV. Her pain remains unchanged   Follow up:   2 days of severe HA during Thanksgiving   Pounding bifrontal region   Pain worse over L eye brow   Follow up:   Reports bifrontal severe HA (worse on L) with no nausea with sudden onset . Occurs daily   NO note:  I found no papilledema or other changes to suggest  "elevated intracranial pressure or other alternative etiology for her headaches. Repeat exam in two years.  HA are less frequent and less intense.   (R levator scapula spasm)   symptoms better with lateral flexion to L   Prior note:   She still has daily HA with severe sharp stabbing pain behind L eye lasting for 15 sec   Prior note:   Daily pain. 2/week - nausea.  Shu Lares RN at 9/17/2014 4:53 PM  Pt presented to clinic for medical advice. Pt is seen by Dr. Paredes and Dr. Cortez.  1) She went ER on 9/13/14 for a migraine. She was given Reglan, Benadryl, MSO4 and IVF. A couple days later she developed an all over body "tremor". She states "I think I have Parkinson's". - Per Dr. Paredes, medication related tremor (Reglan & Benadryl). Informed her it should wear off in a few days. If not, she is to call and let us know.  2) Headaches - triggered by insomnia.   Current meds:  Ketoprofen - she took it once and said her "throat closed up" and she's not supposed to have anything with aspirin in it.  Nortriptyline - she was taking it at night, but it didn't help her sleep, so she stopped it.  Per Dr. Cortez, discontinue Ketoprofen and Nortriptyline. Start Effexor XR 37.5mg QD, tizanidine 4mg BID PRN headache and Klonopin 0.25 - 0.5mg QHS PRN. Will schedule pt for sphenocath procedure within the next week.   Prior note:   46 yo woman with history of HTN and asthma, both reportedly controlled, in hospital early 2013 after "passed out" and became unresponsive. CPR in the field for 8 minutes until EMS arrived. Per ED note, on arrival she was found to be in PEA, given epinephrine. Vfib/Vtach was achieved which was shocked x1. Patient converted to sinus tachycardia after shock. I do not know the time course for the above treatment. On arrival to facility patient was in sinus tachycardia with strong pulses, intubated and unresponsive. ET tube placement was confirmed with direct laryngoscopy and good bilateral breath sounds " "were heard after the tube was pulled back 2 cm. Reported to have "withdrawal" movements in ER during stabilization, then sedated and cooling protocol initiated. Had remarkable   recovery and first seen in clinic in 2013.   Headache history: cluster   Age of onset -    Location - behind L eye   Nature of pain - sharp   Prodrome - no   Aura - No   Duration of headache - 6-7 min   Time to peak intensity -   Pain scale - range of intensity - 9/10   Frequency - daily   Status Migrainosus history - 1-3 days   Time of day of most headaches- anytime   Associated symptoms with the headache:   Red eyes   swelling of L face   Headache history: Migraine   Age of onset -    Location - bifrontal   Nature of pain - Pounding   Prodrome - no   Aura - No   Duration of headache - > 4 hrs   Time to peak intensity -   Pain scale - range of intensity - 9/10   Frequency - 5/week   Status Migrainosus history - 1-3 days   Time of day of most headaches- anytime   Associated symptoms with the headache:   Meningeal symptoms - photophobia, phonophobia, exercise intolerance +   Nausea/vomitting +   Nasal drainage   Visual blurriness +   Pallor/flushing   Dizziness   Vertigo   Confusion   Impaired concentration +   Pain worsened with physical activity +   Neck pain +   Symptoms of increased intracranial pressure:   Whooshing sounds - no   Visual spots/blotches - no   Headache Triggers: unknown   Other comorbid conditions:   Anxiety - no   Motion sickness symptom - no       Treatment history:   Resolution of headache with sleep - yes       Meds tried:   Trigger points involvin/9/15 helped for 1 day   Site: Right   Levator scapula   Pharmacological agent:   0.5% Sensorcaine solution   No complications were noted.  Supraorbital block - helped for 2 days   Tylenol - not help   imitrex - chest tightness   alleve - help   topamax - not helping   Neurontin - not helping   Paxil, Elavil - not help   seroquel - nightmare   Ketoprofen - " "she took it once and said her "throat closed up" and she's not supposed to have anything with aspirin in it.  Nortriptyline - she was taking it at night, but it didn't help her sleep, so she stopped it.  reglan - parkinsonism   zanaflex - help   Toradol 30 mg IM inj at home - too expensive   BOTOX round #1 9/3/15 - helped (R levator scapula spasm)   Round #2 12/3/15 - helped   Round#3 3/316 - helped   Round #4 6/1/16 - helped   BOTOX#5 9/15/16 - helped     BOTOX#6 - 11/30/16 - helped   BOTOX#7 - 3/9/17 - helped    BOTOX#8 - 5/25/17 - helped     Can not do RFA - pt has pacemaker   Procedure: IOVERA peripheral nerve focus cold therapy (non ablative cryotherapy) 12/30/15 - not help   Indication: Cryotherapy of select peripheral nerves of the head was performed to treat chronic headaches that failed to respond to several preventive pharmacological therapies.   Sedation: None   Informed consent: The procedure, risks, benefits and options were discussed with the patient. There are no contraindications to the procedure. The patient expressed understanding and agreed to the procedure. I verify that I personally obtained the patient's consent prior to the start of the procedure.  Location:  Bilateral Supra orbital nerve (3 cycles on R and 1 cycle on L)   Bilateral Occipital nerve   3 cycles   Pain relief: Pain score reduced 10/10->0/10   9/26 Bilateral Sphenopalatine Ganglion and bilateral Maxillary Branch (Trigeminal Nerve) Block - no help   ONB - not help                                            Shannon Pagan RN at 12/31/2014 3:54 PM       Status: Signed                   Expand All Collapse All   HEADACHE FASTTRACK  PAIN 7/10 NAUSEA 0/10  ROUND 1: TORADOL, IMITREX, MORPHINE, BENADRYL, AND MAGNESIUM  PAIN 7/10 NAUSEA 0/10  PT STATED SHE WAS READY TO GO HOME AND GO TO SLEEP. PT D/C'ED IN [facility]            Shannon Pagan RN at 10/5/2015 12:49 PM       Status: Signed                   Expand All Collapse All   HEADACHE " FASTTRACK  PAIN 8/10 NAUSEA 10/10  FIRST ROUND: tORADOL, BENADRYL, COMPAZINE, IMITREX, MAGNESIUM, AND VALPROATE.  PAIN 1/10 NAUSEA 0/10  PT READY TO GO HOME AND FEELING BETTER. PT LEFT IN NAD WITH FAMILY MEMBER  TOTAL TIME: [id number]       Shannon Pagan RN at 10/20/2015 3:05 PM       Status: Signed                   Expand All Collapse All   HEADACHE FASTTRACK  PAIN 10/10 NAUSEA 0/10  FIRST ROUND: tORADOL, BENADRYL, COMPAZINE, IMITREX, MAGNESIUM, AND VALPROATE.  BP BEING MONITORED EVERY 15 MIN AND REMAINED 170'S/80-90'S. DR CHOPRA NOTIFIED AND STATED TO MAKE SURE BP DID NOT EXCEED 180 SYSTOLIC OR PT SHOULD REPORT TO ED. BP AT 1500 183/92. DR CHOPRA NOTIFIED AND GAVE ORDER TO STOP INFUSION AND SEND PATIENT TO ED. PT BROUGHT TO ED BY INFUSION NURSE VIA WHEELCHAIR.   PAIN 8/10 NAUSEA 0/10  TOTAL TIME: [id number]               Progress Notes                           Shannon Pagan RN at 2/24/2016 1:36 PM       Status: Signed                  Expand All Collapse All   HEADACHE FASTTRACK  PAIN 10/10 NAUSEA 7/10  FIRST ROUND: tORADOL, BENADRYL, AND MORPHINE-BP RANGING FROM 177-203/84-92, HR 60-82  MD NOTIFIED AND GAVE ORDERS TO SEND TO ED. PT LEFT VIA W/C WITH INFUSION NURSE ON WAY TO ED.            Headache risk factors:   H/o TBI - [facility] (2013) + neck sprain   H/o Meningitis - no   F/h Aneurysms - no       Review of Systems   Constitutional: Negative.   HENT: Negative.   Eyes: Negative.   Respiratory: Negative.   Cardiovascular: Negative.   Gastrointestinal: Negative.   Endocrine: Negative.   Genitourinary: Negative.   Musculoskeletal: Negative.   Skin: Negative.   Allergic/Immunologic: Negative.   Hematological: Negative.   Psychiatric/Behavioral: insomnia      Objective:     Physical Exam   Constitutional: She is oriented to person, place, and time. She appears well-developed.   obesity   HENT:   Head: Normocephalic and atraumatic.   Right Ear: External ear normal.   Left Ear: External ear normal.   Nose: Nose normal.    Mouth/Throat: Oropharynx is clear and moist.   Eyes: Conjunctivae and EOM are normal. Pupils are equal, round, and reactive to light.   Neck: Normal range of motion.   Cardiovascular: Regular rhythm.   Pulmonary/Chest: Effort normal and breath sounds normal.   Musculoskeletal: Normal range of motion.   Neurological: She is alert and oriented to person, place, and time. She has normal reflexes. She displays normal reflexes. No cranial nerve deficit. She exhibits normal muscle tone. Coordination normal. Uses cane.   Ataxic gait - wide based   Skin: Skin is warm and dry.   Psychiatric: She has a normal mood and flat affect. Her behavior is normal. Judgment and thought content normal.   Headache Exam:  Optic disc is flat OU   Temples appear symmetric  No V1,V2,V3 distribution allodynia/hyperalgesia catalina   No facial swelling  No gross TMJ abnormalities   No ptosis   No conj injection   No meningismus   No neck stiffness  No C spine tenderness  Cervical facet tenderness on palpation catalina   No tender points over trapezium   catalina supraorbital nerve exit point tenderness  catalina occipital nerve exit point tenderness  No auriculotemporal nerve tenderness   Tenderness of levator scapula catalina           Assessment:     1.  Occipital neuralgia     2.  Cervicalgia     3.  4  5  6 Chronic migraine without aura, with intractable migraine, so stated, with status migrainosus (post traumatic) post concussive?  Depression   TMJ - R sided   Insomnia       Head CT  Findings: There is no evidence of acute or recent major vascular territory cerebral infarction, parenchymal hemorrhage, or intra-axial mass.  There are no extra-axial masses or abnormal fluid collections.  The ventricular system is within normal limits of size for age and shows no distortion by mass-effect or evidence of hydrocephalus.  There is no fracture or destructive osseous lesion.  The extracranial soft tissues are unremarkable.  The visualized paranasal sinuses and mastoid  air cells are clear.   Impression    No acute intracranial abnormality.  ______________________________________     Electronically signed by resident: KRISSY MAYERS MD  Date: 03/14/16  Time: 17:09            Results for PUSHPA KEY (MRN [id number]) as of 9/3/2015 14:26     Ref. Range  7/20/2015 11:06  8/19/2015 14:15    Vitamin B-12  Latest Range: 210-950 pg/mL  383       Retinol, serum  Latest Range:  ug/dL     52    Vitamin B2   Latest Range: 1-19 mcg/L      2     Vitamin B6   Latest Range: 5-50 ug/L      4 (L)     Vit D, 25-Hydroxy   Latest Range: 30-96 ng/mL   13 (L)           Plan:     1. Prophylactic medication -   Despiramine 25 mg AM (for dizziness)   Cymbalta 120 mg daily   Aricept 20 mg daily   Neurontin 900 mg TID   Magnesium 200 mg daily  Zanaflex 8 mg PRN   Topamax 150 mg -> 200 mg BID   Cont B2, B6 supplements   2, Breakthrough headache - zanaflex 8 mg, Gabitril 8 mg  PRN percocet Q=30  Multiple alternative treatment options were offered to the patient   3. Refrain from over counter pain medications. Discussed medication overuse headache.cont Magnesium 400 mg PO BID   4. Occipital neuralgia - If medical management is ineffective may consider occipital nerve blocks.   5. I again urged the patient to keep a headache calender.    f/up Dr. Rock for TBI    B12 injection - 5/25/17     BOTOX was performed as an indicated therapy for intractable chronic migraine headaches given that the patient failed > 5 prophylactic medications  Botulinum Toxin Injection Procedure   Pre-operative diagnosis: Chronic migraine   Post-operative diagnosis: Same   Procedure: Chemical neurolysis   After risks and benefits were explained including bleeding, infection, worsening of pain, damage to the areas being injected, weakness of muscles, loss of muscle control, dysphagia if injecting the head or neck, facial droop if injecting the facial area, painful injection, allergic or other reaction to the medications being  injected, and the failure of the procedure to help the problem, a signed consent was obtained.   The patient was placed in a comfortable area and the sites to be treated were identified.The area to be treated was prepped three times with alcohol and the alcohol allowed to dry. Next, a 30 gauge needle was used to inject the medication in the area to be treated.   Area(s) injected:   Total Botox used: 175 Units   Botox wastage: 25 Units   Injection sites:    muscle bilaterally ( a total of 10 units divided into 2 sites)   Procerus muscle (5 units)   Frontalis muscle bilaterally (a total of 20 units divided into 4 sites)   Temporalis muscle bilaterally (a total of 40 units divided into 8 sites)   Occipitalis muscle bilaterally (a total of 30 units divided into 6 sites)   Cervical paraspinal muscles (a total of 20 units divided into 4 sites)   Trapezius muscle bilaterally (a total of 30 units divided into 6 sites) + 5 units catalina   Masseter 5 units x 2   Splenius 5 units 2   Complications: none       RTC for the next Botox injection: 10 weeks

## 2017-08-10 NOTE — TELEPHONE ENCOUNTER
I returned pts call. her questions is: if she were to cancel her botox appt today, when is the next available. per chandler, not until late september. she will keep her appt today

## 2017-08-11 ENCOUNTER — OUTPATIENT CASE MANAGEMENT (OUTPATIENT)
Dept: ADMINISTRATIVE | Facility: OTHER | Age: 52
End: 2017-08-11

## 2017-08-11 NOTE — PROGRESS NOTES
Talked to patient to update plan of care for OPCM; patient stated that she will call back this RN because she couldn't talk at this time.  SHIRLEY Barnard, OPCM-RN

## 2017-08-16 ENCOUNTER — OUTPATIENT CASE MANAGEMENT (OUTPATIENT)
Dept: ADMINISTRATIVE | Facility: OTHER | Age: 52
End: 2017-08-16

## 2017-08-16 NOTE — PROGRESS NOTES
Chart reviewed.  Telephonic follow-up with patient today.  Patient said her daughter works for [employer] and is looking into programs for her housing.  Patient said she and her daughter have reviewed other support resources on the list.  Patient will ask her daughter to call the [employer] and [facility] Patient Financial Assistance programs. Patient reports that her headache is better today.  She said she is going to speak with Dr. Cortez about the headaches.  LCSW will continue to follow.  Resources previously discussed and mailed to patient:  [facility] Housing List for families and disabled persons; Community Choices Waiver; [employer]inal [employer] and Support Groups; [facility] Resource Guide; Community Financial Assistance Programs; Basic Needs Programs; [facility].     Interventions:  See POC     Follow-up plan:  Enccourage medical/mental health appointment compliance  Encourage medication compliance.

## 2017-08-17 ENCOUNTER — TELEPHONE (OUTPATIENT)
Dept: NEUROLOGY | Facility: CLINIC | Age: 52
End: 2017-08-17

## 2017-08-17 ENCOUNTER — OUTPATIENT CASE MANAGEMENT (OUTPATIENT)
Dept: ADMINISTRATIVE | Facility: OTHER | Age: 52
End: 2017-08-17

## 2017-08-17 NOTE — TELEPHONE ENCOUNTER
Called and spoke to Patient.She states she is sleeping on her stomach and is not using her CPAP.Instructed Patient that she must use the CPAP as tolerated as that could trigger migraines-sleep apnea-as well as other medical problems.Informed her to call [facility] company and request another mask.She was also asking about her pain medications-she does not want to take it-informed her to use Zanaflex-which she states she used this am-she can use every 6  Hours as needed for headache-she does state that she does have relief with the use of these medication.Informed her as always,to notify clinic for any further questions,needs,or concerns.

## 2017-08-17 NOTE — PROGRESS NOTES
"Talked to patient to update plan of care for OPCM.  Patient stated that she is taking all medications as prescribed.  Patient stated that she is still having some headaches since 8/15/2017.  Patient stated that the medicine that Dr. Cortez prescribed to patient for her HA "is a narcotic and that I am scared to take them when caring for my children.:"  I will in-basket message Dr. Cortez in regards to patient's c/o HA x 2 days.  Patient stated that she did receive her CPAP machine and is using q Hs.  Patient stated that she did receive the CPAP on 8/11/2017 and that it is a big adjustment in regards to using CPAP at night.  Instructed patient that if she feels that the mask for the CPAP is uncomfortable to call the [employer] company where she received the CPAP and they can change the mask on it.  Patient verbalized understanding.  Patient stated that she is following up with her mental health provider.  Reminded patient of upcoming appointments:  8/24 at 0900 Coumadin Clinic, 9/12 at 0830 for EKG and 9/12 at 0900 with Dr. Coker and verbalized understanding.  Encouraged patient to call this RN if having any questions/concerns.  Will continue to coordinate care with JADE Mueller-Gordo.  Will continue to follow.  Will change care to sohail Barnard OPCM-RN  "

## 2017-08-21 DIAGNOSIS — I48.0 PAROXYSMAL ATRIAL FIBRILLATION: ICD-10-CM

## 2017-08-21 RX ORDER — WARFARIN SODIUM 5 MG/1
TABLET ORAL
Qty: 1060 TABLET | Refills: 3 | Status: SHIPPED | OUTPATIENT
Start: 2017-08-21 | End: 2018-04-03 | Stop reason: ALTCHOICE

## 2017-08-23 ENCOUNTER — TELEPHONE (OUTPATIENT)
Dept: NEUROLOGY | Facility: CLINIC | Age: 52
End: 2017-08-23

## 2017-08-23 DIAGNOSIS — M54.30 SCIATICA, UNSPECIFIED LATERALITY: Primary | ICD-10-CM

## 2017-08-23 NOTE — TELEPHONE ENCOUNTER
----- Message from Cici Hill sent at 8/23/2017  1:48 PM CDT -----  Contact: self @ [phone]  Pt is calling to see if dr dillon can prescribe something for her sciatica pain, like a muscle relaxer or something.  pls call.

## 2017-08-23 NOTE — TELEPHONE ENCOUNTER
"The pain is always located on the Right side "right behind" and goes down her right leg, the pain gets so bad that she have to walk with a cane. Pt vidhi like to know if you can prescribe something to help with the pain a muscle relaxer.   "

## 2017-08-24 ENCOUNTER — ANTI-COAG VISIT (OUTPATIENT)
Dept: CARDIOLOGY | Facility: CLINIC | Age: 52
End: 2017-08-24
Payer: MEDICARE

## 2017-08-24 DIAGNOSIS — Z79.01 LONG TERM (CURRENT) USE OF ANTICOAGULANTS: Primary | ICD-10-CM

## 2017-08-24 DIAGNOSIS — I48.0 PAROXYSMAL ATRIAL FIBRILLATION: ICD-10-CM

## 2017-08-24 LAB — INR PPP: 2.4 (ref 2–3)

## 2017-08-24 PROCEDURE — 85610 PROTHROMBIN TIME: CPT | Mod: PBBFAC | Performed by: PHARMACIST

## 2017-08-24 NOTE — PROGRESS NOTES
Patient denies any changes in diet, medications, or health that would effect warfarin therapy.   Patient was re-educated on situations that would require placing a call to the coumadin clinic, including bleeding or unusual bruising issues, changes in health, diet or medications, upcoming procedures that require warfarin interruption, and missed coumadin dose(s). Patient expressed understanding that avoidance of consistency with these parameters could cause fluctuations in INR, leading to more frequent visits and increase risk of adverse events.

## 2017-08-30 ENCOUNTER — OUTPATIENT CASE MANAGEMENT (OUTPATIENT)
Dept: ADMINISTRATIVE | Facility: OTHER | Age: 52
End: 2017-08-30

## 2017-08-31 ENCOUNTER — OUTPATIENT CASE MANAGEMENT (OUTPATIENT)
Dept: ADMINISTRATIVE | Facility: OTHER | Age: 52
End: 2017-08-31

## 2017-08-31 NOTE — PROGRESS NOTES
Attempted to update plan of care for OPCM; unable to reach and unable to leave voice message.  SHIRLEY Barnard, OPCM-RN

## 2017-09-01 NOTE — TELEPHONE ENCOUNTER
Please let he know that I would prefer to start with PT and over the counter medications first.  She is already taking many brain-acting medications.

## 2017-09-05 ENCOUNTER — TELEPHONE (OUTPATIENT)
Dept: ELECTROPHYSIOLOGY | Facility: CLINIC | Age: 52
End: 2017-09-05

## 2017-09-05 NOTE — TELEPHONE ENCOUNTER
----- Message from Pacheco Jorge MA sent at 9/5/2017  3:01 PM CDT -----  Contact: patient  Please see below  ----- Message -----  From: Sosa Edwards  Sent: 9/5/2017   2:53 PM  To: Roberto ABREU Staff    Please call pt at [phone]. Questions regarding pacemaker recall    Thank you  Patient called to see if her [place] device was one on recall. I informed her that it is not.

## 2017-09-07 ENCOUNTER — HOSPITAL ENCOUNTER (EMERGENCY)
Facility: OTHER | Age: 52
Discharge: HOME | End: 2017-09-07
Attending: EMERGENCY MEDICINE
Payer: MEDICARE

## 2017-09-07 ENCOUNTER — TELEPHONE (OUTPATIENT)
Dept: NEUROLOGY | Facility: CLINIC | Age: 52
End: 2017-09-07

## 2017-09-07 ENCOUNTER — OUTPATIENT CASE MANAGEMENT (OUTPATIENT)
Dept: ADMINISTRATIVE | Facility: OTHER | Age: 52
End: 2017-09-07

## 2017-09-07 VITALS
HEART RATE: 60 BPM | SYSTOLIC BLOOD PRESSURE: 147 MMHG | DIASTOLIC BLOOD PRESSURE: 78 MMHG | HEIGHT: 64 IN | OXYGEN SATURATION: 99 % | WEIGHT: 264 LBS | RESPIRATION RATE: 14 BRPM | BODY MASS INDEX: 45.07 KG/M2 | TEMPERATURE: 99 F

## 2017-09-07 DIAGNOSIS — G89.29 CHRONIC NONINTRACTABLE HEADACHE, UNSPECIFIED HEADACHE TYPE: Primary | ICD-10-CM

## 2017-09-07 DIAGNOSIS — R51.9 CHRONIC NONINTRACTABLE HEADACHE, UNSPECIFIED HEADACHE TYPE: Primary | ICD-10-CM

## 2017-09-07 PROCEDURE — 96374 THER/PROPH/DIAG INJ IV PUSH: CPT

## 2017-09-07 PROCEDURE — 96375 TX/PRO/DX INJ NEW DRUG ADDON: CPT

## 2017-09-07 PROCEDURE — 25000003 PHARM REV CODE 250: Performed by: EMERGENCY MEDICINE

## 2017-09-07 PROCEDURE — 99284 EMERGENCY DEPT VISIT MOD MDM: CPT | Mod: 25

## 2017-09-07 PROCEDURE — 63600175 PHARM REV CODE 636 W HCPCS: Performed by: EMERGENCY MEDICINE

## 2017-09-07 RX ORDER — MORPHINE SULFATE 2 MG/ML
6 INJECTION, SOLUTION INTRAMUSCULAR; INTRAVENOUS
Status: COMPLETED | OUTPATIENT
Start: 2017-09-07 | End: 2017-09-07

## 2017-09-07 RX ORDER — TIAGABINE HYDROCHLORIDE 4 MG/1
4 TABLET, FILM COATED ORAL NIGHTLY
COMMUNITY
End: 2018-03-07 | Stop reason: SDUPTHER

## 2017-09-07 RX ORDER — KETOROLAC TROMETHAMINE 30 MG/ML
30 INJECTION, SOLUTION INTRAMUSCULAR; INTRAVENOUS
Status: COMPLETED | OUTPATIENT
Start: 2017-09-07 | End: 2017-09-07

## 2017-09-07 RX ORDER — LANOLIN ALCOHOL/MO/W.PET/CERES
400 CREAM (GRAM) TOPICAL
Status: COMPLETED | OUTPATIENT
Start: 2017-09-07 | End: 2017-09-07

## 2017-09-07 RX ADMIN — MORPHINE SULFATE 6 MG: 2 INJECTION, SOLUTION INTRAMUSCULAR; INTRAVENOUS at 08:09

## 2017-09-07 RX ADMIN — Medication 400 MG: at 08:09

## 2017-09-07 RX ADMIN — KETOROLAC TROMETHAMINE 30 MG: 30 INJECTION, SOLUTION INTRAMUSCULAR at 08:09

## 2017-09-07 NOTE — TELEPHONE ENCOUNTER
"Spoke to Patient.There are no availabilities in infusion.She states pain is a "30" on pain scale-instructed her that usual values are a 1-10 on scale.She states she has taken all of her medications.Strongly instructed her to be seen at urgent care or ED if her headache is this severe.She states she will go to an urgent care near her home.Informed her that all of her clinic notes will be available for provider to review.  "

## 2017-09-07 NOTE — TELEPHONE ENCOUNTER
----- Message from London Lee sent at 9/7/2017  2:14 PM CDT -----  Contact: Self @ [phone]  Pt is requesting to speak with Laina in regards to getting an infusion due to headaches she's experiencing for a few days.

## 2017-09-08 NOTE — ED NOTES
Pt resting on stretcher, denies needs at this time. Reports continuing relief from pain. Bed locked in lowest position, side rails up x2, call light within reach. Will continue to monitor.

## 2017-09-08 NOTE — ED NOTES
Pt reports some relief after medication administration, rates pain 8/10. Pt resting comfortably, respirations even and unlabored. Bed locked in lowest position,  ails up x2, call light within reach. Will continue to monitor.

## 2017-09-08 NOTE — ED PROVIDER NOTES
"Encounter Date: 9/7/2017    SCRIBE #1 NOTE: I, Neelam Birch , am scribing for, and in the presence of, Dr. Roberson.       History     Chief Complaint   Patient presents with    Headache     severe intermittent HA x2 days pta, relieved by RX meds but comes back, spoke with neurologist was told to come to ED or urgent care, seen at urgent care was told to come here secondary to pts hx, denies N/V/D, blurred vision, weakness or any other symptoms      Time seen by provider: 8:05 PM    This is a 52 y.o. female who presents with complaint of headache that began two days ago. Headache mainly affects the "top of the head and behind eyes," and is described as sharp and intermittent, currently rating 10/10. She has no other complaints, and denies fever, chills, congestion, sinus pressure, phonophobia, nausea, vomiting, abdominal pain, neck pain, neck stiffness, vision disturbance, photophobia, speech difficulty, dizziness, or light-headedness. She experiences little relief after receiving BOTOX every three months (last received one month ago), and is also seen in infusion clinics for Toradol, magnesium, and morphine. Pt was instructed to go to the ED by neurologist, Dr. Cortez. Symptoms are consistent with previous headaches she has experienced since anoxic brain injury and cardiac arrest.       The history is provided by the patient.     Review of patient's allergies indicates:   Allergen Reactions    Aspirin Hives    Imitrex [sumatriptan] Palpitations    Nsaids (non-steroidal anti-inflammatory drug) Shortness Of Breath    Penicillins Hives and Swelling    Shellfish containing products Anaphylaxis     seafood    Percocet [oxycodone-acetaminophen] Itching     States can take    Reglan [metoclopramide hcl] Other (See Comments)     Parkinsonism      Past Medical History:   Diagnosis Date    Asthma     Brain anoxic injury     Coronary artery disease     Defibrillator activation 2013    Depression     History of sudden " cardiac arrest 2/2013    PEA arrest with subsequent long-QT    Hypertension     Pacemaker 2013    Seizures     Stroke     weakness lt side     Past Surgical History:   Procedure Laterality Date    breast reduction      BREAST SURGERY      CARDIAC DEFIBRILLATOR PLACEMENT      CARDIAC DEFIBRILLATOR PLACEMENT      CARPAL TUNNEL RELEASE Right     COSMETIC SURGERY      HERNIA REPAIR      HIATAL HERNIA REPAIR      INSERT / REPLACE / REMOVE PACEMAKER      TUBAL LIGATION       Family History   Problem Relation Age of Onset    Hypertension Mother     COPD      Heart failure       Social History   Substance Use Topics    Smoking status: Never Smoker    Smokeless tobacco: Never Used    Alcohol use No     Review of Systems   Constitutional: Negative for chills and fever.   HENT: Negative for congestion and sinus pressure.         Negative for phonophobia.   Eyes: Negative for photophobia and visual disturbance.   Respiratory: Negative for cough and shortness of breath.    Cardiovascular: Negative for chest pain and palpitations.   Gastrointestinal: Negative for abdominal pain, diarrhea, nausea and vomiting.   Genitourinary: Negative for difficulty urinating, dysuria and vaginal discharge.   Musculoskeletal: Negative for joint swelling, neck pain and neck stiffness.   Skin: Negative for rash.   Neurological: Positive for headaches. Negative for dizziness, speech difficulty, weakness, light-headedness and numbness.   Psychiatric/Behavioral: Negative for confusion.       Physical Exam     Initial Vitals [09/07/17 1903]   BP Pulse Resp Temp SpO2   (!) 164/76 68 14 98.5 °F (36.9 °C) 98 %      MAP       105.33         Physical Exam    Nursing note and vitals reviewed.  Constitutional: She appears well-developed and well-nourished. She is not diaphoretic. No distress.   HENT:   Head: Normocephalic and atraumatic.   Right Ear: External ear normal.   Left Ear: External ear normal.   Eyes: EOM are normal. Pupils are  equal, round, and reactive to light. Right eye exhibits no discharge. Left eye exhibits no discharge.   Neck: Normal range of motion. Neck supple.   Cardiovascular: Normal rate, regular rhythm and normal heart sounds. Exam reveals no gallop and no friction rub.    No murmur heard.  Pulmonary/Chest: Breath sounds normal. No respiratory distress. She has no wheezes. She has no rhonchi. She has no rales.   Abdominal: Soft. There is no tenderness. There is no rebound and no guarding.   Musculoskeletal: Normal range of motion. She exhibits no edema or tenderness.   Neurological: She is alert and oriented to person, place, and time. No cranial nerve deficit.   Speech is normal.    Skin: Skin is warm and dry. No rash and no abscess noted. No erythema. No pallor.   Psychiatric: She has a normal mood and affect. Her behavior is normal. Judgment and thought content normal.         ED Course   Procedures  Labs Reviewed - No data to display             Additional MDM:   Comments: 52-year-old female with history of chronic headaches presents complaining of bilateral headache consistent with her previous headaches.  She denies any new symptoms.  She states that when her pain become so severe she goes to the infusion clinic to get a combination of Toradol, morphine, and magnesium.  Her neuro exam was nonfocal.  Vital signs are significant only for mildly elevated blood pressure.  She received a combination of morphine, Toradol, and by mouth magnesium.  Upon reassessment she reported symptomatic improvement and stated she was ready to go home.  She was discharged in stable condition to follow-up with Dr. Coughlin, her neurologist..          Scribe Attestation:   Scribe #1: I performed the above scribed service and the documentation accurately describes the services I performed. I attest to the accuracy of the note.    Attending Attestation:           Physician Attestation for Scribe:  Physician Attestation Statement for Scribe #1: I,  Dr. Roberson, reviewed documentation, as scribed by Neelam Birch  in my presence, and it is both accurate and complete.                 ED Course      Clinical Impression:   No diagnosis found.                             Violeta Roberson MD  09/08/17 1833

## 2017-09-08 NOTE — ED TRIAGE NOTES
Pt presents to ED with c/o intermittent migraine x 2 days, reports sharp pain behind right and left eye and top of head. Pt reports Hx of migraines, states she took Gabitril with no relief, reports she was unable to get appointment at the tranfusion center. Pt denies n/v, denies vision changes, reports intermittent dizziness. Pt AAO x4.

## 2017-09-11 ENCOUNTER — CLINICAL SUPPORT (OUTPATIENT)
Dept: REHABILITATION | Facility: HOSPITAL | Age: 52
End: 2017-09-11
Attending: PSYCHIATRY & NEUROLOGY
Payer: MEDICARE

## 2017-09-11 DIAGNOSIS — M79.10 MUSCLE TENDERNESS: ICD-10-CM

## 2017-09-11 DIAGNOSIS — M53.86 DECREASED ROM OF LUMBAR SPINE: ICD-10-CM

## 2017-09-11 DIAGNOSIS — M62.81 MUSCLE WEAKNESS OF LOWER EXTREMITY: ICD-10-CM

## 2017-09-11 PROCEDURE — G8978 MOBILITY CURRENT STATUS: HCPCS | Mod: CL,PO

## 2017-09-11 PROCEDURE — 97161 PT EVAL LOW COMPLEX 20 MIN: CPT | Mod: PO

## 2017-09-11 PROCEDURE — G8979 MOBILITY GOAL STATUS: HCPCS | Mod: CK,PO

## 2017-09-11 PROCEDURE — 97110 THERAPEUTIC EXERCISES: CPT | Mod: PO

## 2017-09-11 NOTE — PATIENT INSTRUCTIONS
Extension in Standing    Place hands on back of hips and lean back, pushing hips gently forward. Repeat slowly and continuously.  Repeat 10 times per set. Do 2 sets per session. Do 2 sessions per day.        Outer Hip Stretch: Figure Four (Chair)        Adjust bend of supporting leg for less deep stretch.  Hold for __30 secs. Repeat __2__ times each leg.     Hamstring Stretch (Standing)        Standing, place one heel on chair or bench. Use one or both hands on thigh for support. Keeping torso straight, lean forward slowly until a stretch is felt in back of same thigh.  Hold _30___ seconds. Repeat 2 times with both legs.    Copyright © [employer]. All rights reserved.

## 2017-09-11 NOTE — PLAN OF CARE
OUTPATIENT PHYSICAL THERAPY  PHYSICAL THERAPY EVALUATION    Name: Amna Chawla  [facility] Number: [id number]    Diagnosis:   Encounter Diagnoses   Name Primary?    Decreased ROM of lumbar spine     Muscle weakness of lower extremity     Muscle tenderness       Physician: Toby Paredes MD  Treatment Orders: PT Eval and Treat  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    PEA arrest with subsequent long-QT    Hypertension     Pacemaker 2013    Seizures     Stroke     weakness lt side     Current Outpatient Prescriptions   Medication Sig    aripiprazole (ABILIFY) 5 MG Tab Take 1 tablet (5 mg total) by mouth every evening.    atorvastatin (LIPITOR) 40 MG tablet Take 1 tablet (40 mg total) by mouth every morning.    carvedilol (COREG) 25 MG tablet TAKE 1 TABLET(25 MG) BY MOUTH TWICE DAILY WITH MEALS    chlorthalidone (HYGROTEN) 25 MG Tab Take 1 tablet (25 mg total) by mouth once daily.    clonazePAM (KLONOPIN) 0.5 MG tablet Take 1 tablet (0.5 mg total) by mouth daily as needed for Anxiety.    donepezil (ARICEPT) 10 MG tablet Take 1 tablet (10 mg total) by mouth 2 (two) times daily.    gabapentin (NEURONTIN) 300 MG capsule Take 3 capsules (900 mg total) by mouth 3 (three) times daily.    lorazepam (ATIVAN) 0.5 MG tablet Take 1 tablet (0.5 mg total) by mouth every 6 (six) hours as needed for Anxiety.    magnesium 200 mg Tab Take 200 mg by mouth once daily.    ondansetron (ZOFRAN-ODT) 4 MG TbDL Take 1 tablet (4 mg total) by mouth every 24 hours as needed (nausea).    pantoprazole (PROTONIX) 40 MG tablet Take 1 tablet (40 mg total) by mouth once daily.    tiagabine (GABITRIL) 4 MG tablet Take 4 mg by mouth every evening.    tizanidine (ZANAFLEX) 4 MG tablet Take 4 mg by mouth every 6 (six) hours as needed.    topiramate (TOPAMAX) 100 MG tablet Take  "2 tablets (200 mg total) by mouth 2 (two) times daily.    trazodone (DESYREL) 100 MG tablet Take 2 tablets (200 mg total) by mouth every evening.    VITAMIN D2 50,000 unit capsule TAKE 1 CAPSULE BY MOUTH EVERY 7 DAYS    warfarin (COUMADIN) 5 MG tablet TAKE 1 1/2 TABLET BY MOUTH ON TUESDAYS, THURSDAYS AND SATURDAYS. TAKE 2 TABLETS BY MOUTH ON ALL OTHER DAYS    zolpidem (AMBIEN) 10 mg Tab Take 1 tablet (10 mg total) by mouth nightly as needed.     Current Facility-Administered Medications   Medication    cyanocobalamin injection 1,000 mcg    onabotulinumtoxina injection 200 Units    onabotulinumtoxina injection 200 Units    onabotulinumtoxina injection 200 Units    onabotulinumtoxina injection 200 Units    onabotulinumtoxina injection 200 Units    onabotulinumtoxina injection 200 Units     Review of patient's allergies indicates:   Allergen Reactions    Aspirin Hives    Imitrex [sumatriptan] Palpitations    Nsaids (non-steroidal anti-inflammatory drug) Shortness Of Breath    Penicillins Hives and Swelling    Shellfish containing products Anaphylaxis     seafood    Percocet [oxycodone-acetaminophen] Itching     States can take    Reglan [metoclopramide hcl] Other (See Comments)     Parkinsonism        Time in: 10:05 AM  Time Out: 11:00 AM   Total Treatment Time: 55 minutes    Date of eval: 9/11/2017  Visit #: 1/2  Auth expiration: 12/31/7  POC expiration: 12/1/17     Precautions: standard    Subjective   History of Present Illness: pt reports having a stroke Feb 2013, a year after that the pt climbed a ladder and reports "blacking out and hit the floor." Pt reports L LE weaker than R LE following stroke. Pt reports descending 3 flights on the Pt reports pain in R lumbar region that begins as a"dull ache" in R lumbar and extends into R calf as a"cramping feeling."   DOI: most recent exacerbation began 1 month ago   Onset:: insidious   Patient c/o: continuous and intermittent exacerbation of " symptoms  Pain Scale: Amna rates pain on a scale of 0-10 to be 10 at worst; 5 currently; 1 at best .  Aggravating factors: sitting too long   Relieving factors: none noted    Previous treatment: none  Imaging: x-ray  Past surgical history: pacemaker and defibrillator implant, Feb 2015    Prior level of function: ongoing deficits for > 3 yrs  Functional deficits: unable to walk long distances, unable to sit for long periods, difficulty being a passenger  Occupation: disabled,  Environment: 3rd floor apt story home with an elevator, reports using SPC lately, as needed    No cultural or spiritual barriers identified to treatment or learning.  Patient's goals: The patients goal is to return to PLOF without pain or risk of re-injury.        Objective   Mental status: oriented x3  Posture/ Alignment: Poor, Protruded Head, Protracted Scapula    GAIT DEVIATIONS: Amna amb with trendelenberg gait with R hip drop, yielding decreased RLE clearance.    FUNCTION:   - SLS R: 3 sec  - SLS L: 8 sec  - Bed Mobility: demonstrates mod I bed mobility  - Stairs: B UE support with alternating gait and L lateral lean    ROM:    AROM ROM (% of normal) ROM Comment Normal   Flexion: 100%  * pain at end range 60 deg   Extension: 100%   25 deg   Lateral Flex R: 50%  * 25 deg   Lateral Flex L: 50%   25 deg   Rotation R: 50%  * 18 deg   Rotation L: 50%   18 deg   *denotes pain    Strength:      Right Left Comment   Hip Flexion: 4-/5 4-/5    Hip ABD: 4-/5 4-/5 *    Hip ADD: 4-/5 3+/5    Hip Extension: 4-/5 3+/5    Knee Ext: 4/5 4/5    Knee Flex: 4+/5 4+/5          Special Tests:     -NOBLE: Right: neg Left: neg  -Scour: Right: neg Left: pos  -Piriformis Test: Right: neg Left: pos  -Sacral thrust: produced pain at SIJ    Flexibility:  Pt demonstrates increased tightness in B hamstrings and hip flexors (unable to perform special tests due to difficulty with positioning and soft tissue approximation)    Neural Tension:   - Slump test: pos, pain on  "L lumbar side      Palpation: tenderness and pain present at L gluteal region: piriformis m, glute med, central lumbo-sacral region    Joint Play: hypomibility of lumbar spine    Treatment: pt participated in therapeutic exercise including hamstring stretch 3 x 30 sec, figure 4 stretch 3 x 30 sec and lumbar extensions x 10    Pt/family was provided educational information, including: role of PT, goals for PT, scheduling - pt verbalized understanding. Discussed insurance plan with pt.     Assessment   Amna is a 52 y.o. female referred to outpatient physical therapy with a medical diagnosis of sciatica due to inflamed L piriformis and gluteus medius mm. Pt reports of R LE "cramping" likely due to R side preference and L side weakness following stroke. Demonstrates impairments including limitations as described in the problem list. Pt prognosis is Good. Positive prognostic factors include prior success with PT. Negative prognostic factors include PLOF and chronicity secondary to stroke. Pt will benefit from skilled outpatient physical therapy to address the above stated deficits, provide pt/family education, and to maximize pt's level of independence.     History  Co-morbidities and personal factors that may impact the plan of care Examination  Body Structures and Functions, activity limitations and participation restrictions that may impact the plan of care    Clinical Presentation   Co-morbidities:   pacemaker implant, prior CVA affecting L LE        Personal Factors:   lifestyle Body Regions:   back  lower extremities    Body Systems:   gross symmetry  ROM  strength  gross coordinated movement  balance  gait  motor control        Participation Restrictions:   Unable to sit for prolonged periods, difficulty walking long distances or using stairs     Activity limitations:   Mobility  walking  using transportation (bus, train, plane, car)    Self care  no deficits    Domestic Life  shopping  doing house work (cleaning " house, washing dishes, laundry)    Community and Social Life  community life  recreation and leisure         stable and uncomplicated                      low   low  low Decision Making/ Complexity Score:  low     Pt's spiritual, cultural and educational needs considered and pt agreeable to plan of care and goals as stated below:     Anticipated Barriers for physical therapy: potential scheduling difficulties due to pt transportation    GOALS:    Short Term GOALS:  In 4 weeks, pt. will:  Pt will report decrease in pain </= 5 /10 at worst in order to tolerate sitting while a car passenger.  Pt will demonstrate > 10 % increase in lumbar ROM to improve tolerance to household activities.  Pt will demonstrate improved posture during exercises with minimal cueing in order to improve body mechanics with activity.  Pt will demonstrate increased strength in B LE by 1 MMT grade to increase tolerance to walking.  Pt will demonstrate increase motor control of L gluteus medius during stance phase of ambulation to improve R LE clearance during gait.    Long Term GOALS:  In 8 weeks, pt. Will:  Pt will report no pain while sitting > 30 minutes for better tolerance to commuting.  Pt will demonstrate return of lumbar ROM to WNL to ability to reach and lift within the home.  Pt will demonstrate improved posture during exercises with no verbal cueing in order to improve body mechanics with activity.  Pt will demonstrate increased strength in B LE to 4+/5 MMT grade to increase endurance with walking.  Pt will demonstrate increase motor control of L gluteus medius during stance phase of ambulation to independence with stair navigation.  Pt will be independent with HEP and SX management       G-Codes:  Intake 36% 64% Current Status CL - At least 60 percent but less than 80 percent  Predicted 59% 41% Goal Status+ CK - At least 40 percent but less than 60 percent    Plan   Outpatient physical therapy 1- 2 times weekly to include: pt ed,  HEP, therapeutic exercises, therapeutic activities, neuromuscular re-education/ balance exercises, manual therapy, and modalities prn. Cont PT for 3 months. Pt may be seen by PTA as part of the rehabilitation team.     I certify the need for these services furnished under this plan of treatment and while under my care.    Viktor Manuel, PT

## 2017-09-11 NOTE — PROGRESS NOTES
OUTPATIENT PHYSICAL THERAPY  PHYSICAL THERAPY EVALUATION    Name: Amna Chawla  [facility] Number: [id number]    Diagnosis:   Encounter Diagnoses   Name Primary?    Decreased ROM of lumbar spine     Muscle weakness of lower extremity     Muscle tenderness       Physician: Toby Paredes MD  Treatment Orders: PT Eval and Treat  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    PEA arrest with subsequent long-QT    Hypertension     Pacemaker 2013    Seizures     Stroke     weakness lt side     Current Outpatient Prescriptions   Medication Sig    aripiprazole (ABILIFY) 5 MG Tab Take 1 tablet (5 mg total) by mouth every evening.    atorvastatin (LIPITOR) 40 MG tablet Take 1 tablet (40 mg total) by mouth every morning.    carvedilol (COREG) 25 MG tablet TAKE 1 TABLET(25 MG) BY MOUTH TWICE DAILY WITH MEALS    chlorthalidone (HYGROTEN) 25 MG Tab Take 1 tablet (25 mg total) by mouth once daily.    clonazePAM (KLONOPIN) 0.5 MG tablet Take 1 tablet (0.5 mg total) by mouth daily as needed for Anxiety.    donepezil (ARICEPT) 10 MG tablet Take 1 tablet (10 mg total) by mouth 2 (two) times daily.    gabapentin (NEURONTIN) 300 MG capsule Take 3 capsules (900 mg total) by mouth 3 (three) times daily.    lorazepam (ATIVAN) 0.5 MG tablet Take 1 tablet (0.5 mg total) by mouth every 6 (six) hours as needed for Anxiety.    magnesium 200 mg Tab Take 200 mg by mouth once daily.    ondansetron (ZOFRAN-ODT) 4 MG TbDL Take 1 tablet (4 mg total) by mouth every 24 hours as needed (nausea).    pantoprazole (PROTONIX) 40 MG tablet Take 1 tablet (40 mg total) by mouth once daily.    tiagabine (GABITRIL) 4 MG tablet Take 4 mg by mouth every evening.    tizanidine (ZANAFLEX) 4 MG tablet Take 4 mg by mouth every 6 (six) hours as needed.    topiramate (TOPAMAX) 100 MG tablet Take  "2 tablets (200 mg total) by mouth 2 (two) times daily.    trazodone (DESYREL) 100 MG tablet Take 2 tablets (200 mg total) by mouth every evening.    VITAMIN D2 50,000 unit capsule TAKE 1 CAPSULE BY MOUTH EVERY 7 DAYS    warfarin (COUMADIN) 5 MG tablet TAKE 1 1/2 TABLET BY MOUTH ON TUESDAYS, THURSDAYS AND SATURDAYS. TAKE 2 TABLETS BY MOUTH ON ALL OTHER DAYS    zolpidem (AMBIEN) 10 mg Tab Take 1 tablet (10 mg total) by mouth nightly as needed.     Current Facility-Administered Medications   Medication    cyanocobalamin injection 1,000 mcg    onabotulinumtoxina injection 200 Units    onabotulinumtoxina injection 200 Units    onabotulinumtoxina injection 200 Units    onabotulinumtoxina injection 200 Units    onabotulinumtoxina injection 200 Units    onabotulinumtoxina injection 200 Units     Review of patient's allergies indicates:   Allergen Reactions    Aspirin Hives    Imitrex [sumatriptan] Palpitations    Nsaids (non-steroidal anti-inflammatory drug) Shortness Of Breath    Penicillins Hives and Swelling    Shellfish containing products Anaphylaxis     seafood    Percocet [oxycodone-acetaminophen] Itching     States can take    Reglan [metoclopramide hcl] Other (See Comments)     Parkinsonism        Time in: 10:05 AM  Time Out: 11:00 AM   Total Treatment Time: 55 minutes    Date of eval: 9/11/2017  Visit #: 1/2  Auth expiration: 12/31/7  POC expiration: 12/1/17     Precautions: standard    Subjective   History of Present Illness: pt reports having a stroke Feb 2013, a year after that the pt climbed a ladder and reports "blacking out and hit the floor." Pt reports L LE weaker than R LE following stroke. Pt reports descending 3 flights on the Pt reports pain in R lumbar region that begins as a"dull ache" in R lumbar and extends into R calf as a"cramping feeling."   DOI: most recent exacerbation began 1 month ago   Onset:: insidious   Patient c/o: continuous and intermittent exacerbation of " symptoms  Pain Scale: Amna rates pain on a scale of 0-10 to be 10 at worst; 5 currently; 1 at best .  Aggravating factors: sitting too long   Relieving factors: none noted    Previous treatment: none  Imaging: x-ray  Past surgical history: pacemaker and defibrillator implant, Feb 2015    Prior level of function: ongoing deficits for > 3 yrs  Functional deficits: unable to walk long distances, unable to sit for long periods, difficulty being a passenger  Occupation: disabled,  Environment: 3rd floor apt story home with an elevator, reports using SPC lately, as needed    No cultural or spiritual barriers identified to treatment or learning.  Patient's goals: The patients goal is to return to PLOF without pain or risk of re-injury.        Objective   Mental status: oriented x3  Posture/ Alignment: Poor, Protruded Head, Protracted Scapula    GAIT DEVIATIONS: Amna amb with trendelenberg gait with R hip drop, yielding decreased RLE clearance.    FUNCTION:   - SLS R: 3 sec  - SLS L: 8 sec  - Bed Mobility: demonstrates mod I bed mobility  - Stairs: B UE support with alternating gait and L lateral lean    ROM:    AROM ROM (% of normal) ROM Comment Normal   Flexion: 100%  * pain at end range 60 deg   Extension: 100%   25 deg   Lateral Flex R: 50%  * 25 deg   Lateral Flex L: 50%   25 deg   Rotation R: 50%  * 18 deg   Rotation L: 50%   18 deg   *denotes pain    Strength:      Right Left Comment   Hip Flexion: 4-/5 4-/5    Hip ABD: 4-/5 4-/5 *    Hip ADD: 4-/5 3+/5    Hip Extension: 4-/5 3+/5    Knee Ext: 4/5 4/5    Knee Flex: 4+/5 4+/5          Special Tests:     -NOBLE: Right: neg Left: neg  -Scour: Right: neg Left: pos  -Piriformis Test: Right: neg Left: pos  -Sacral thrust: produced pain at SIJ    Flexibility:  Pt demonstrates increased tightness in B hamstrings and hip flexors (unable to perform special tests due to difficulty with positioning and soft tissue approximation)    Neural Tension:   - Slump test: pos, pain on  "L lumbar side      Palpation: tenderness and pain present at L gluteal region: piriformis m, glute med, central lumbo-sacral region    Joint Play: hypomibility of lumbar spine    Treatment: pt participated in therapeutic exercise including hamstring stretch 3 x 30 sec, figure 4 stretch 3 x 30 sec and lumbar extensions x 10    Pt/family was provided educational information, including: role of PT, goals for PT, scheduling - pt verbalized understanding. Discussed insurance plan with pt.     Assessment   Amna is a 52 y.o. female referred to outpatient physical therapy with a medical diagnosis of sciatica due to inflamed L piriformis and gluteus medius mm. Pt reports of R LE "cramping" likely due to R side preference and L side weakness following stroke. Demonstrates impairments including limitations as described in the problem list. Pt prognosis is Good. Positive prognostic factors include prior success with PT. Negative prognostic factors include PLOF and chronicity secondary to stroke. Pt will benefit from skilled outpatient physical therapy to address the above stated deficits, provide pt/family education, and to maximize pt's level of independence.     History  Co-morbidities and personal factors that may impact the plan of care Examination  Body Structures and Functions, activity limitations and participation restrictions that may impact the plan of care    Clinical Presentation   Co-morbidities:   pacemaker implant, prior CVA affecting L LE        Personal Factors:   lifestyle Body Regions:   back  lower extremities    Body Systems:   gross symmetry  ROM  strength  gross coordinated movement  balance  gait  motor control        Participation Restrictions:   Unable to sit for prolonged periods, difficulty walking long distances or using stairs     Activity limitations:   Mobility  walking  using transportation (bus, train, plane, car)    Self care  no deficits    Domestic Life  shopping  doing house work (cleaning " house, washing dishes, laundry)    Community and Social Life  community life  recreation and leisure         stable and uncomplicated                      low   low  low Decision Making/ Complexity Score:  low     Pt's spiritual, cultural and educational needs considered and pt agreeable to plan of care and goals as stated below:     Anticipated Barriers for physical therapy: potential scheduling difficulties due to pt transportation    GOALS:    Short Term GOALS:  In 4 weeks, pt. will:  Pt will report decrease in pain </= 5 /10 at worst in order to tolerate sitting while a car passenger.  Pt will demonstrate > 10 % increase in lumbar ROM to improve tolerance to household activities.  Pt will demonstrate improved posture during exercises with minimal cueing in order to improve body mechanics with activity.  Pt will demonstrate increased strength in B LE by 1 MMT grade to increase tolerance to walking.  Pt will demonstrate increase motor control of L gluteus medius during stance phase of ambulation to improve R LE clearance during gait.    Long Term GOALS:  In 8 weeks, pt. Will:  Pt will report no pain while sitting > 30 minutes for better tolerance to commuting.  Pt will demonstrate return of lumbar ROM to WNL to ability to reach and lift within the home.  Pt will demonstrate improved posture during exercises with no verbal cueing in order to improve body mechanics with activity.  Pt will demonstrate increased strength in B LE to 4+/5 MMT grade to increase endurance with walking.  Pt will demonstrate increase motor control of L gluteus medius during stance phase of ambulation to independence with stair navigation.  Pt will be independent with HEP and SX management       G-Codes:  Intake 36% 64% Current Status CL - At least 60 percent but less than 80 percent  Predicted 59% 41% Goal Status+ CK - At least 40 percent but less than 60 percent    Plan   Outpatient physical therapy 1- 2 times weekly to include: pt ed,  HEP, therapeutic exercises, therapeutic activities, neuromuscular re-education/ balance exercises, manual therapy, and modalities prn. Cont PT for 3 months. Pt may be seen by PTA as part of the rehabilitation team.     I certify the need for these services furnished under this plan of treatment and while under my care.    Viktor Manuel, PT

## 2017-09-12 ENCOUNTER — LAB VISIT (OUTPATIENT)
Dept: LAB | Facility: HOSPITAL | Age: 52
End: 2017-09-12
Attending: INTERNAL MEDICINE
Payer: MEDICARE

## 2017-09-12 ENCOUNTER — TELEPHONE (OUTPATIENT)
Dept: ELECTROPHYSIOLOGY | Facility: CLINIC | Age: 52
End: 2017-09-12

## 2017-09-12 ENCOUNTER — OFFICE VISIT (OUTPATIENT)
Dept: ELECTROPHYSIOLOGY | Facility: CLINIC | Age: 52
End: 2017-09-12
Payer: MEDICARE

## 2017-09-12 ENCOUNTER — HOSPITAL ENCOUNTER (OUTPATIENT)
Dept: CARDIOLOGY | Facility: CLINIC | Age: 52
Discharge: HOME | End: 2017-09-12
Payer: MEDICARE

## 2017-09-12 VITALS
HEART RATE: 70 BPM | WEIGHT: 268.06 LBS | SYSTOLIC BLOOD PRESSURE: 126 MMHG | HEIGHT: 64 IN | BODY MASS INDEX: 45.76 KG/M2 | DIASTOLIC BLOOD PRESSURE: 78 MMHG

## 2017-09-12 DIAGNOSIS — I44.30 HEART BLOCK, AV: ICD-10-CM

## 2017-09-12 DIAGNOSIS — I10 ESSENTIAL HYPERTENSION: ICD-10-CM

## 2017-09-12 DIAGNOSIS — I48.0 PAROXYSMAL ATRIAL FIBRILLATION: Primary | ICD-10-CM

## 2017-09-12 DIAGNOSIS — I48.91 ATRIAL FIBRILLATION, UNSPECIFIED TYPE: ICD-10-CM

## 2017-09-12 DIAGNOSIS — Z95.810 AUTOMATIC IMPLANTABLE CARDIOVERTER-DEFIBRILLATOR IN SITU: ICD-10-CM

## 2017-09-12 DIAGNOSIS — I48.0 PAROXYSMAL ATRIAL FIBRILLATION: ICD-10-CM

## 2017-09-12 DIAGNOSIS — R06.09 DYSPNEA ON EXERTION: ICD-10-CM

## 2017-09-12 LAB
ALBUMIN SERPL BCP-MCNC: 2.7 G/DL
ALP SERPL-CCNC: 64 U/L
ALT SERPL W/O P-5'-P-CCNC: 17 U/L
ANION GAP SERPL CALC-SCNC: 4 MMOL/L
AST SERPL-CCNC: 12 U/L
BASOPHILS # BLD AUTO: 0 K/UL
BASOPHILS NFR BLD: 0 %
BILIRUB SERPL-MCNC: 0.4 MG/DL
BUN SERPL-MCNC: 9 MG/DL
CALCIUM SERPL-MCNC: 8.8 MG/DL
CHLORIDE SERPL-SCNC: 107 MMOL/L
CO2 SERPL-SCNC: 27 MMOL/L
CREAT SERPL-MCNC: 0.8 MG/DL
DIFFERENTIAL METHOD: ABNORMAL
EOSINOPHIL # BLD AUTO: 0 K/UL
EOSINOPHIL NFR BLD: 0.3 %
ERYTHROCYTE [DISTWIDTH] IN BLOOD BY AUTOMATED COUNT: 15.3 %
EST. GFR  (AFRICAN AMERICAN): >60 ML/MIN/1.73 M^2
EST. GFR  (NON AFRICAN AMERICAN): >60 ML/MIN/1.73 M^2
GLUCOSE SERPL-MCNC: 92 MG/DL
HCT VFR BLD AUTO: 35.6 %
HGB BLD-MCNC: 11.8 G/DL
LYMPHOCYTES # BLD AUTO: 1.4 K/UL
LYMPHOCYTES NFR BLD: 42.8 %
MAGNESIUM SERPL-MCNC: 1.9 MG/DL
MCH RBC QN AUTO: 28.6 PG
MCHC RBC AUTO-ENTMCNC: 33.1 G/DL
MCV RBC AUTO: 86 FL
MONOCYTES # BLD AUTO: 0.4 K/UL
MONOCYTES NFR BLD: 12 %
NEUTROPHILS # BLD AUTO: 1.5 K/UL
NEUTROPHILS NFR BLD: 44.9 %
PLATELET # BLD AUTO: 193 K/UL
PMV BLD AUTO: 11.5 FL
POTASSIUM SERPL-SCNC: 3.9 MMOL/L
PROT SERPL-MCNC: 8.2 G/DL
RBC # BLD AUTO: 4.13 M/UL
SODIUM SERPL-SCNC: 138 MMOL/L
WBC # BLD AUTO: 3.34 K/UL

## 2017-09-12 PROCEDURE — 99215 OFFICE O/P EST HI 40 MIN: CPT | Mod: PBBFAC,25 | Performed by: INTERNAL MEDICINE

## 2017-09-12 PROCEDURE — 99999 PR PBB SHADOW E&M-EST. PATIENT-LVL V: CPT | Mod: PBBFAC,,, | Performed by: INTERNAL MEDICINE

## 2017-09-12 PROCEDURE — 3074F SYST BP LT 130 MM HG: CPT | Mod: ,,, | Performed by: INTERNAL MEDICINE

## 2017-09-12 PROCEDURE — 99214 OFFICE O/P EST MOD 30 MIN: CPT | Mod: S$PBB,,, | Performed by: INTERNAL MEDICINE

## 2017-09-12 PROCEDURE — 80053 COMPREHEN METABOLIC PANEL: CPT

## 2017-09-12 PROCEDURE — 93005 ELECTROCARDIOGRAM TRACING: CPT | Mod: PBBFAC | Performed by: INTERNAL MEDICINE

## 2017-09-12 PROCEDURE — 83735 ASSAY OF MAGNESIUM: CPT

## 2017-09-12 PROCEDURE — 85025 COMPLETE CBC W/AUTO DIFF WBC: CPT

## 2017-09-12 PROCEDURE — 93010 ELECTROCARDIOGRAM REPORT: CPT | Mod: S$PBB,,, | Performed by: INTERNAL MEDICINE

## 2017-09-12 PROCEDURE — 36415 COLL VENOUS BLD VENIPUNCTURE: CPT

## 2017-09-12 PROCEDURE — 3078F DIAST BP <80 MM HG: CPT | Mod: ,,, | Performed by: INTERNAL MEDICINE

## 2017-09-12 NOTE — PROGRESS NOTES
Subjective:    Patient ID:  Amna Chawla is a 52 y.o. female who presents for follow-up of Automatic implantable cardioverter-defibrillator in situ      HPI  Prior Hx:  I had the pleasure of seeing Amna Chawla in follow-up today for her history of cardiac arrest, heart block, and ICD implantation. As you are aware, she is a 51-year old female, former patient of Dr. Humberto Martins and patient of mine I cared for when she initially presented in cardiac arrest as well as followed in my general cardiology fellow clinic, who was [place] [facility] [place] after having a cardiac arrest.  She was working as a school  and collapsed at work.  On EMS arrival it was described that she was pulseless and given epinephrine (? Initially PEA) however after epinephrine noted to be in VF and was resuscitated with defibrillation/ACLS.  She underwent hypothermia protocol. Her post-arrest course was notable or intermittent 3rd-degree AV block. On 2/15/2013, a dual chamber ICD was implanted. Her EF prior to discharge was 60%. She had a negative coronary CTA during that admission.  In subsequent follow-up she underwent a pharmacologic stress ECHO in 2014 which showed a preserved LVEF and no ischemia.     Subsequent ICD interrogations show no VT, 100% RV pacing.  She recently noted an episode of palpitations.  She called our device clinic.  Remote check disclosed a 50 minute episode of paroxysmal afib/flutter with RVR (3/2017).  Review of egms shows it initiated with likely a short burst of afib and then turns into a regular rhythm with TCL ~210msec (at least in the right atrium as sensed by RA lead).  We discussed this over the phone including need for anticoagulation.  After given options she elected for coumadin.  TSH was checked and is normal.     Interim Hx:  Ms. Chawla presents for 6 month visit after initiating coumadin. She is tolerating it well. She continues to see Neurology for recurrent migraines. She was  having atypical chest pain and saw general cardiology. A PET stress was ordered and performed 3/2017 which showed no ischemia. She has been noting progressive dyspnea on exertion.     I reviewed today's ECG tracing, which shows A-sensed V-paced rhythm at 70 bpm and a QRS duration of 180 ms.    Review of Systems   Constitution: Negative for fever, weakness and malaise/fatigue.   HENT: Negative for congestion.    Eyes: Negative for blurred vision and visual disturbance.   Cardiovascular: Positive for dyspnea on exertion. Negative for chest pain, irregular heartbeat, leg swelling, near-syncope, orthopnea, palpitations, paroxysmal nocturnal dyspnea and syncope.   Respiratory: Negative for cough and wheezing.    Hematologic/Lymphatic: Negative for bleeding problem. Does not bruise/bleed easily.   Skin: Negative.    Musculoskeletal: Negative.    Gastrointestinal: Negative for bloating, abdominal pain, hematochezia and melena.   Genitourinary: Negative.    Neurological: Positive for headaches. Negative for dizziness and focal weakness.        Objective:    Physical Exam   Constitutional: She is oriented to person, place, and time. She appears well-developed and well-nourished. No distress.   HENT:   Head: Normocephalic and atraumatic.   Eyes: Conjunctivae are normal. Right eye exhibits no discharge. Left eye exhibits no discharge.   Neck: Neck supple. No JVD present.   Cardiovascular: Normal rate, regular rhythm and normal heart sounds.  Exam reveals no gallop and no friction rub.    No murmur heard.  Pulmonary/Chest: Effort normal and breath sounds normal. No respiratory distress. She has no wheezes. She has no rales.   Abdominal: Soft. Bowel sounds are normal. She exhibits no distension. There is no tenderness.   Musculoskeletal: She exhibits no edema or tenderness.   Neurological: She is alert and oriented to person, place, and time.   Skin: Skin is warm and dry. She is not diaphoretic.   Psychiatric: She has a normal  mood and affect. Her behavior is normal. Judgment and thought content normal.   Vitals reviewed.        Assessment:       1. Paroxysmal atrial fibrillation    2. Dyspnea on exertion    3. Heart block, AV    4. Essential hypertension    5. Automatic implantable cardioverter-defibrillator in situ         Plan:       In summary, Mrs. Chawla is a pleasant 50 yo cardiac arrest survivor with VF noted during arrest, no ischemic heart disease with preserved LVEF and now 3rd degree AV block who presents for ICD follow-up as well as having an episode of symptomatic sustained atrial fibrillation/atrial flutter (initiated irregularly then settled into a regular atrial tachycardia as seen on the RA lead with TCL ~210ms).  She notes progressive dyspnea on exertion with walking up stairs. Will check ECHO to evaluate EF in setting of chronic RV pacing. Continue warfarin, she understands bleeding risks. Check CMP, CBC. If testing is normal then continue follow-up every 6 months    Thank you for allowing me to participate in the care of this patient. Please do not hesitate to call me with any questions or concerns.    Avelino Mejia MD, PhD  Cardiac Electrophysiology

## 2017-09-12 NOTE — TELEPHONE ENCOUNTER
Spoke with patient and advised that, per Dr Mejia, her electrolytes and CBC were normal. He wanted to add Magnesium to current labs. Spoke with James in the lab and they still have the sample. They will run the magnesium level today.

## 2017-09-12 NOTE — TELEPHONE ENCOUNTER
Spoke with pt to advise that her magnesium level was normal, per Dr Mejia. She verbalizes understanding.

## 2017-09-12 NOTE — PROGRESS NOTES
Please notify the patient that her lab results including electrolytes and blood count are normal. A magnesium level was not measured, which she was inquiring about. Can you call the lab and add this on?

## 2017-09-13 ENCOUNTER — TELEPHONE (OUTPATIENT)
Dept: ELECTROPHYSIOLOGY | Facility: CLINIC | Age: 52
End: 2017-09-13

## 2017-09-13 ENCOUNTER — HOSPITAL ENCOUNTER (OUTPATIENT)
Dept: CARDIOLOGY | Facility: CLINIC | Age: 52
Discharge: HOME | End: 2017-09-13
Payer: MEDICARE

## 2017-09-13 DIAGNOSIS — R06.09 DYSPNEA ON EXERTION: ICD-10-CM

## 2017-09-13 DIAGNOSIS — I44.30 HEART BLOCK, AV: ICD-10-CM

## 2017-09-13 LAB
DIASTOLIC DYSFUNCTION: YES
ESTIMATED PA SYSTOLIC PRESSURE: 25.81
MITRAL VALVE REGURGITATION: ABNORMAL
RETIRED EF AND QEF - SEE NOTES: 40 (ref 55–65)
TRICUSPID VALVE REGURGITATION: ABNORMAL

## 2017-09-13 PROCEDURE — 93306 TTE W/DOPPLER COMPLETE: CPT | Mod: PBBFAC | Performed by: INTERNAL MEDICINE

## 2017-09-14 ENCOUNTER — TELEPHONE (OUTPATIENT)
Dept: ELECTROPHYSIOLOGY | Facility: CLINIC | Age: 52
End: 2017-09-14

## 2017-09-14 ENCOUNTER — OUTPATIENT CASE MANAGEMENT (OUTPATIENT)
Dept: ADMINISTRATIVE | Facility: OTHER | Age: 52
End: 2017-09-14

## 2017-09-14 NOTE — TELEPHONE ENCOUNTER
----- Message from Sarai Pedraza MA sent at 9/14/2017 10:57 AM CDT -----  Contact: Pt Amna Torres [phone]  Jesus Fernandez,    Please see below. Pt returning call on Echo results.    Thanks,  Sarai  ----- Message -----  From: Elvira Cali  Sent: 9/13/2017   5:06 PM  To: Roberto ABREU Staff    Pt is requesting a call back from the nurse regarding test results. Pt did return call but it was after 5pm    Pt may be reached at [phone].    Thank you.  AVEL

## 2017-09-14 NOTE — LETTER
September 14, 2017    Amna Chawla  [address]  [place] [id number]             [facility]  [address]  [place] [address] Dear Amna,    I work with [facility]'s Outpatient Case Management Department. I have been unsuccessful at reaching you to follow-up to see how you have been doing. If you require any future assistance or if any new concerns or problems arise, please do not hesitate to call.     The Outpatient Case Management Department can be reached at [phone] from 8:00AM to 4:30 PM on Monday thru Friday. [facility] also has a program where a nurse is available 24/7 to answer questions or provide medical advice, their number is [phone].    Thanks,      Vianey Barnard, RN  Outpatient Case Management

## 2017-09-14 NOTE — PROGRESS NOTES
Attempted to update plan of care for OPCM; left voice message to return call.  Letter sent.  SHIRLEY Barnard OPCM-RN

## 2017-09-14 NOTE — TELEPHONE ENCOUNTER
Called patient to discuss ECHO results. Her EF is now 40-45%. Was normal for the past few years. She had a recent normal PET stress. This is likely from chronic RV pacing. We discussed this possibility in clinic and she remembers the conversion. I discussed the need for upgrade to CRT-D. I offered to set up the procedure. She would like to come back in clinic for a formal discussion with her sister present. Will have clinic staff arrange for a return visit in the next 2 weeks.

## 2017-09-20 ENCOUNTER — TELEPHONE (OUTPATIENT)
Dept: ELECTROPHYSIOLOGY | Facility: CLINIC | Age: 52
End: 2017-09-20

## 2017-09-20 ENCOUNTER — OUTPATIENT CASE MANAGEMENT (OUTPATIENT)
Dept: ADMINISTRATIVE | Facility: OTHER | Age: 52
End: 2017-09-20

## 2017-09-20 ENCOUNTER — PATIENT MESSAGE (OUTPATIENT)
Dept: ELECTROPHYSIOLOGY | Facility: CLINIC | Age: 52
End: 2017-09-20

## 2017-09-20 NOTE — TELEPHONE ENCOUNTER
Spoke with patient. She still wants to see Dr Mejia on 9/27/2017, but wanted to pencil in the date for her device upgrade because her sister will be in town on 10/9/2017. Advised that I have her down for 10/9/2017.

## 2017-09-20 NOTE — PROGRESS NOTES
Chart reviewed.  Telephonic follow-up with patient today. Patient reports that she and her daughter have begun applying for community resource programs and will continue to do so. She said her daughter does not work for [employer], but rather is a CM for the State.  Patient said that she is doing better with depression and denies S/I/H/I.  Patient reports that she has been compliant with medical/mental health appointments and medication.  She reports that all social support needs are being addressed.  Discharge discussed.  Patient encouraged to call if additional support is needed in the future. Patient gave verbal permission for her case to be discussed with her daughter, Silverio Dey, and her sister, Maribel Avendaño, should they call.  Case closed.  OPCM RN notified of case closure.

## 2017-09-21 ENCOUNTER — OUTPATIENT CASE MANAGEMENT (OUTPATIENT)
Dept: ADMINISTRATIVE | Facility: OTHER | Age: 52
End: 2017-09-21

## 2017-09-21 NOTE — PROGRESS NOTES
Attempted to update plan of care for OPCM; left voice message to return call.  SHIRLEY Barnard, OPCM-RN

## 2017-09-27 ENCOUNTER — HOSPITAL ENCOUNTER (OUTPATIENT)
Dept: CARDIOLOGY | Facility: CLINIC | Age: 52
Discharge: HOME | End: 2017-09-27
Payer: MEDICARE

## 2017-09-27 ENCOUNTER — OFFICE VISIT (OUTPATIENT)
Dept: ELECTROPHYSIOLOGY | Facility: CLINIC | Age: 52
End: 2017-09-27
Payer: MEDICARE

## 2017-09-27 ENCOUNTER — ANTI-COAG VISIT (OUTPATIENT)
Dept: CARDIOLOGY | Facility: CLINIC | Age: 52
End: 2017-09-27
Payer: MEDICARE

## 2017-09-27 VITALS
WEIGHT: 266.13 LBS | SYSTOLIC BLOOD PRESSURE: 126 MMHG | BODY MASS INDEX: 45.43 KG/M2 | HEART RATE: 63 BPM | HEIGHT: 64 IN | DIASTOLIC BLOOD PRESSURE: 82 MMHG

## 2017-09-27 DIAGNOSIS — Z95.810 AUTOMATIC IMPLANTABLE CARDIOVERTER-DEFIBRILLATOR IN SITU: ICD-10-CM

## 2017-09-27 DIAGNOSIS — I48.0 PAROXYSMAL ATRIAL FIBRILLATION: ICD-10-CM

## 2017-09-27 DIAGNOSIS — I48.0 PAROXYSMAL ATRIAL FIBRILLATION: Primary | ICD-10-CM

## 2017-09-27 DIAGNOSIS — Z95.0 PACEMAKER: ICD-10-CM

## 2017-09-27 DIAGNOSIS — Z79.01 LONG TERM (CURRENT) USE OF ANTICOAGULANTS: Primary | ICD-10-CM

## 2017-09-27 DIAGNOSIS — I10 ESSENTIAL HYPERTENSION: ICD-10-CM

## 2017-09-27 DIAGNOSIS — I42.8 NONISCHEMIC CARDIOMYOPATHY: ICD-10-CM

## 2017-09-27 DIAGNOSIS — E66.01 OBESITY, CLASS III, BMI 40-49.9 (MORBID OBESITY): ICD-10-CM

## 2017-09-27 LAB — INR PPP: 1.9 (ref 2–3)

## 2017-09-27 PROCEDURE — 99211 OFF/OP EST MAY X REQ PHY/QHP: CPT | Mod: PBBFAC | Performed by: PHARMACIST

## 2017-09-27 PROCEDURE — 93010 ELECTROCARDIOGRAM REPORT: CPT | Mod: S$PBB,,, | Performed by: INTERNAL MEDICINE

## 2017-09-27 PROCEDURE — 99999 PR PBB SHADOW E&M-EST. PATIENT-LVL IV: CPT | Mod: PBBFAC,,, | Performed by: INTERNAL MEDICINE

## 2017-09-27 PROCEDURE — 85610 PROTHROMBIN TIME: CPT | Mod: PBBFAC | Performed by: PHARMACIST

## 2017-09-27 PROCEDURE — 99999 PR PBB SHADOW E&M-EST. PATIENT-LVL I: CPT | Mod: PBBFAC,,, | Performed by: PHARMACIST

## 2017-09-27 PROCEDURE — 93005 ELECTROCARDIOGRAM TRACING: CPT | Mod: PBBFAC | Performed by: INTERNAL MEDICINE

## 2017-09-27 PROCEDURE — 99215 OFFICE O/P EST HI 40 MIN: CPT | Mod: S$PBB,,, | Performed by: INTERNAL MEDICINE

## 2017-09-27 PROCEDURE — 99214 OFFICE O/P EST MOD 30 MIN: CPT | Mod: PBBFAC,27 | Performed by: INTERNAL MEDICINE

## 2017-09-27 PROCEDURE — 3079F DIAST BP 80-89 MM HG: CPT | Mod: ,,, | Performed by: INTERNAL MEDICINE

## 2017-09-27 PROCEDURE — 3074F SYST BP LT 130 MM HG: CPT | Mod: ,,, | Performed by: INTERNAL MEDICINE

## 2017-09-27 NOTE — PATIENT INSTRUCTIONS
Understanding Cardiac Resynchronization Therapy (CRT)    Cardiac resynchronization therapy (CRT) is a treatment that may help you when your heart isnt pumping as well as it should. This problem can be caused by heart failure, a condition in which the heart muscle has become weak. It can also be caused by an electrical problem that keeps the bottom chambers of the heart (ventricles) from beating in sync. This can make heart failure worse.  When you have heart failure, fluid can build up in your lungs and your legs (edema). You may have less energy and be short of breath. These symptoms interfere with daily life.  CRT uses a small device to help improve the timing of the hearts contractions. CRT can ease symptoms, improve your quality of life, and help you live longer.  How CRT Works  With CRT, a small electrical device (pacemaker or defibrillator) is put under the skin in the upper chest. This is a minor surgical procedure done at a hospital. It is not heart surgery. Wires from the device lead to the ventricles. The device sends electrical pulses to each ventricle at the same time. This keeps the ventricles beating in sync . This procedure is also called biventricular pacing or resynchronization pacing.  CRT can be done with 1 of 2 devices. The type of device used depends on the persons needs. The devices are:  · A biventricular pacemaker. This device helps the heart beat at a normal rate.  · A biventricular ICD (implantable cardioverter defibrillator). This device is a pacemaker but also can treat fast, life-threatening heart rhythms.  Reasons for CRT  Your healthcare provider may advise CRT if:  · You have heart failure symptoms and your heart doesnt pump well  · The ventricles are not working together  · Tests, like an echocardiogram, show that your heart is weak and enlarged  · Medicine and lifestyle changes are not working well enough to control your heart failure  Benefits of CRT  CRT wont replace your  other treatments. Its part of a complete heart failure treatment plan. CRT helps a weakened heart do a better job of pumping blood out of the heart with each beat. So, more blood and oxygen go to the rest of your body. This can decrease heart failure symptoms and improve survival. The device is put into your body during a low-risk procedure.  Not everyone with heart failure benefits from CRT. CRT improves symptoms in about 2 out of 3 people who get it. For those who have mild symptoms, it can also prevent worsening heart failure. With CRT, you may be more able to:  · Return to daily activities such as walking, carrying grocery bags, and climbing stairs  · Have more energy to be active and do the things you enjoy  · Breathe more easily when you lie flat, so you sleep better at night  · Have less swelling in your ankles, feet, and abdomen  · Make fewer visits to the hospital because of heart failure symptoms  · Have fewer side effects from your heart failure medicines  Risks and complications  Like all medical procedures, having a CRT device implanted has some risks. These include:  · Anesthesia reactions  · Swelling or bruising in the upper chest area where the CRT device is placed  · Bleeding  · Infection  · Heart rhythm problems  · Collapsed lung  · Nerve or blood vessel damage  · Movement of the device or the device wires, which may require a second procedure  · Mechanical problems with the CRT device  · Kidney damage  · Sudden worsening of heart failure  · Other risks related to your specific medical condition  Life with CRT  If you have a CRT device, you may need to stay away from certain electronic devices and devices that have strong magnetic fields. These devices can interfere with how the CRT device works. You will need to avoid anti-theft systems, security metal detectors, CB radios, and very strong magnets, such as those used in MRI. However, depending on the type of device you have implanted, you may be  able to undergo an MRI with certain precautions in place. Talk to your cardiologist and the radiologist to make sure it is safe.  Your provider may also give you specific instructions for using cell phones and headphones with mp3 players. Your provider may give you a list of other devices and procedures to avoid. Most people with CRT devices can still enjoy physical activity, including sports and exercise.  You should not drive until your doctor says it's OK. The driving restriction is done to be sure that your health condition does not cause a safety issue for yourself or others on the road. Ask your provider when it may be safe for you to continue to drive.  You will have regular appointments to see how the device is working and to check the battery life of your device. Some of the device monitoring can be done using a home device that transmits the information about your device to your provider's office over a telephone or internet connection. The battery, or generator, will have to be changed at some point and your provider will let you know as that time approaches.  Date Last Reviewed: 6/1/2016 © 2000-2017 [employer]. [address] [place] [address], [place] [address]. All rights reserved. This information is not intended as a substitute for professional medical care. Always follow your healthcare professional's instructions.

## 2017-09-27 NOTE — PROGRESS NOTES
"Patient's INR slightly low today at 1.9.  Patient denies any changes in diet, medications, or health that would effect warfarin therapy.  She reports she will have pacemaker procedure on 10/13. Per notes in EPIC from Dr Mejia, "Plan: Upgrade to CRT-D, Biotronik system  Venogram on table prior to prep (needs contrast prep).  Would like INR ~2, ok if it is slightly under as she is predominantly in sinus rhythm"  "

## 2017-09-27 NOTE — PROGRESS NOTES
Subjective:    Patient ID:  Amna Chawla is a 52 y.o. female who presents for follow-up of Congestive Heart Failure      HPI  I had the pleasure of seeing Amna Chawla in follow-up today for her history of cardiac arrest, heart block, and ICD implantation. As you are aware, she is a 52-year old female, former patient of Dr. Humberto Martins and patient of mine I cared for when she initially presented in cardiac arrest as well as followed in my general cardiology fellow clinic, who was [place] [facility] [place] after having a cardiac arrest.  She was working as a school  and collapsed at work.  On EMS arrival it was described that she was pulseless and given epinephrine (? Initially PEA) however after epinephrine noted to be in VF and was resuscitated with defibrillation/ACLS.  She underwent hypothermia protocol. Her post-arrest course was notable or intermittent 3rd-degree AV block. On 2/15/2013, a dual chamber ICD was implanted. Her EF prior to discharge was 60%. She had a negative coronary CTA during that admission.  In subsequent follow-up she underwent a pharmacologic stress ECHO in 2014 which showed a preserved LVEF and no ischemia.     Subsequent ICD interrogations show no VT, 100% RV pacing.  She recently noted an episode of palpitations.  She called our device clinic.  Remote check disclosed a 50 minute episode of paroxysmal afib/flutter with RVR (3/2017).  Review of egms shows it initiated with likely a short burst of afib and then turns into a regular rhythm with TCL ~210msec (at least in the right atrium as sensed by RA lead).  We discussed this over the phone including need for anticoagulation.  After given options she elected for coumadin.  TSH was checked and is normal. She is tolerating coumadin well. She notes recent progressive dyspnea on exertion     We performed an echocardiogram on Ms. Chawla and her EF is now 40-45% in setting of chronic RV pacing. Recent PET stress showed no  ischemia/infarct. She presents for discussion on device upgrade to CRT device.     CONCLUSIONS     1 - Mildly to moderately depressed left ventricular systolic function (EF 40-45%).     2 - Impaired LV relaxation, normal LAP (grade 1 diastolic dysfunction).     3 - Normal right ventricular systolic function .     4 - The estimated PA systolic pressure is greater than 26 mmHg.     5 - Trivial to mild mitral regurgitation.     6 - Trivial to mild tricuspid regurgitation.     7 - Severe left atrial enlargement.     8 - No wall motion abnormalities.     I reviewed today's ECG tracing, which shows A-sensed V-paced rhythm at 70 bpm and a QRS duration of 184 ms.    Review of Systems   Constitution: Negative for weakness and malaise/fatigue.   HENT: Negative.    Eyes: Negative for blurred vision and visual disturbance.   Cardiovascular: Positive for dyspnea on exertion. Negative for chest pain, irregular heartbeat, near-syncope, orthopnea, palpitations, paroxysmal nocturnal dyspnea and syncope.   Respiratory: Negative for cough and shortness of breath.    Hematologic/Lymphatic: Negative for bleeding problem. Does not bruise/bleed easily.   Skin: Negative.    Musculoskeletal: Negative.    Gastrointestinal: Negative for hematochezia and melena.   Neurological: Positive for headaches. Negative for dizziness and focal weakness.        Objective:    Physical Exam   Constitutional: She is oriented to person, place, and time. She appears well-developed and well-nourished. No distress.   HENT:   Head: Normocephalic and atraumatic.   Eyes: Conjunctivae are normal. Right eye exhibits no discharge. Left eye exhibits no discharge.   Neck: Neck supple. No JVD present.   Cardiovascular: Normal rate, regular rhythm and normal heart sounds.  Exam reveals no gallop and no friction rub.    No murmur heard.  Pulmonary/Chest: Effort normal and breath sounds normal. No respiratory distress. She has no wheezes. She has no rales.   Abdominal:  Soft. Bowel sounds are normal. She exhibits no distension. There is no tenderness. There is no rebound.   Musculoskeletal: She exhibits no edema.   Neurological: She is alert and oriented to person, place, and time.   Skin: Skin is warm and dry. She is not diaphoretic.   Psychiatric: She has a normal mood and affect. Her behavior is normal. Judgment and thought content normal.   Vitals reviewed.        Assessment:       1. Paroxysmal atrial fibrillation    2. Nonischemic cardiomyopathy, likely from RV pacing    3. Essential hypertension    4. Automatic implantable cardioverter-defibrillator in situ    5. Obesity, Class III, BMI 40-49.9 (morbid obesity)         Plan:       In summary, Mrs. Chawla is a pleasant 51 yo cardiac arrest survivor with VF noted during arrest, no ischemic heart disease with preserved LVEF and now 3rd degree AV block who presents for ICD follow-up as well as having an episode of symptomatic sustained atrial fibrillation/atrial flutter (initiated irregularly then settled into a regular atrial tachycardia as seen on the RA lead with TCL ~210ms).  She notes progressive dyspnea on exertion with walking up stairs with newly discovered LVEF of 40% and normal PET stress. This is likely new onset cardiomyopathy, NYHA class II sx in setting of chronic RV pacing. Recommend upgrade to CRT-D (per BLOCK HF trial, EF <50% and heart block benefited from CRT, FDA approved indication, although HRS guidelines have not been updated and still recommend it for EF <35%). We discussed the alternatives, benefits and risks of the procedure including pain, infection, bleeding, injury to lung causing pneumothorax requiring tube placement, injury to heart valves, puncture of the heart leading to pericardial effusion or tamponade requiring tube drainage, heart attack, stroke and death. Her and her families' questions answered. She desires to proceed. Consent signed    Plan  Upgrade to CRT-D, Biotronik system  Venogram on  table prior to prep (needs contrast prep)  Would like INR ~2, ok if it is slightly under as she is predominantly in sinus rhythm  Anesthesia for sedation    Thank you for allowing me to participate in the care of this patient. Please do not hesitate to call me with any questions or concerns.     Avelino Mejia MD, PhD  Cardiac Electrophysiology

## 2017-09-28 DIAGNOSIS — Z95.0 PACEMAKER: Primary | ICD-10-CM

## 2017-09-29 ENCOUNTER — OUTPATIENT CASE MANAGEMENT (OUTPATIENT)
Dept: ADMINISTRATIVE | Facility: OTHER | Age: 52
End: 2017-09-29

## 2017-09-29 ENCOUNTER — PATIENT MESSAGE (OUTPATIENT)
Dept: ELECTROPHYSIOLOGY | Facility: CLINIC | Age: 52
End: 2017-09-29

## 2017-09-29 NOTE — PROGRESS NOTES
9-29-17-- Chart reviewed. Noted that Dirk has made 5 unsuccessful attempts to follow-up with patient for OPCM.-6th Attempt to complete follow-upfor Outpatient Care Management,As this is my 6th unsuccessful attempt to complete initial assessment for OPCM, I will mail a letter with my contact information. Madison WALLACE CCM

## 2017-09-29 NOTE — LETTER
September 29, 2017    Amna Chawla  [address]  [place] [address]             [facility]  [address]  [place] [address] [place] ,    I work with [facility]'s Outpatient Case Management Department.We have been unsuccessful at reaching you to follow-up to see how you have been doing. Please call Vianey back at your earliest convenience to discuss your health care needs.      Vianey can be reached at [phone] from 8:00AM to 4:30 PM on Monday thru Friday. [facility] also has a program where a nurse is available 24/7 to answer questions or provide medical advice, their number is [phone].    Thanks,      Adriana CRAMER RN  Outpatient Case Management

## 2017-10-02 ENCOUNTER — TELEPHONE (OUTPATIENT)
Dept: ELECTROPHYSIOLOGY | Facility: CLINIC | Age: 52
End: 2017-10-02

## 2017-10-02 DIAGNOSIS — I49.9 CARDIAC ARRHYTHMIA, UNSPECIFIED CARDIAC ARRHYTHMIA TYPE: ICD-10-CM

## 2017-10-02 DIAGNOSIS — G43.711 CHRONIC MIGRAINE WITHOUT AURA, WITH INTRACTABLE MIGRAINE, SO STATED, WITH STATUS MIGRAINOSUS: Primary | ICD-10-CM

## 2017-10-02 DIAGNOSIS — I42.8 NONISCHEMIC CARDIOMYOPATHY: Primary | ICD-10-CM

## 2017-10-02 NOTE — TELEPHONE ENCOUNTER
----- Message from Sosa Edwards sent at 10/2/2017  9:51 AM CDT -----  Contact: patient  Please call pt at [phone]. Patient is requesting to have labs scheduled so she can schedule her coumadin appt around that. Dr Mejia pt     Thank you

## 2017-10-02 NOTE — TELEPHONE ENCOUNTER
----- Message from Sosa Edwards sent at 10/2/2017  9:51 AM CDT -----  Contact: patient  Please call pt at [phone]. Patient is requesting to have labs scheduled so she can schedule her coumadin appt around that. Dr Mejia pt     Thank you

## 2017-10-03 ENCOUNTER — PATIENT MESSAGE (OUTPATIENT)
Dept: ELECTROPHYSIOLOGY | Facility: CLINIC | Age: 52
End: 2017-10-03

## 2017-10-03 DIAGNOSIS — I42.8 NONISCHEMIC CARDIOMYOPATHY: ICD-10-CM

## 2017-10-03 DIAGNOSIS — I42.9 CARDIOMYOPATHY, UNSPECIFIED TYPE: Primary | ICD-10-CM

## 2017-10-03 DIAGNOSIS — Z95.810 AUTOMATIC IMPLANTABLE CARDIOVERTER-DEFIBRILLATOR IN SITU: Primary | ICD-10-CM

## 2017-10-03 RX ORDER — DIPHENHYDRAMINE HCL 50 MG
CAPSULE ORAL
Qty: 3 CAPSULE | Refills: 1 | Status: ON HOLD | OUTPATIENT
Start: 2017-10-03 | End: 2017-10-13

## 2017-10-03 RX ORDER — CIMETIDINE 300 MG/1
TABLET, FILM COATED ORAL
Qty: 3 TABLET | Refills: 1 | Status: ON HOLD | OUTPATIENT
Start: 2017-10-03 | End: 2017-10-13

## 2017-10-03 RX ORDER — PREDNISONE 50 MG/1
TABLET ORAL
Qty: 3 TABLET | Refills: 1 | Status: ON HOLD | OUTPATIENT
Start: 2017-10-03 | End: 2017-10-13

## 2017-10-03 NOTE — PROGRESS NOTES
Post-Procedure Patient Discharge Instructions  Pacemaker/Defibrillator  Wound Care   If you are discharged with a standard dressing over the incision, you may remove the dressing after 24 hours.    If you are discharged with an AQUACEL dressing, you should keep it on until your follow-up appointment in 1-2 weeks.   If there are Steri-strips (strips of tape) over your incision, leave them on until your follow-up appointment. They may begin to fall off on their own, which is normal. If there is Dermabond (clear glue) over your incision, do not scrub it off. It acts as a barrier and will eventually disappear.   You will be discharged with 5 days of oral antibiotics. Please take the full prescription until it is gone.   Do not get the incision wet for 48 hours following the procedure. You may sponge bath during this period, working around the incision. After 48 hours, you may shower, but you should still try to keep this area as dry as possible, and avoid direct water contact to the incision (allow the water to hit back of your shoulder rather than directly on the incision). Gently pat the incision dry if it does get wet.   You may take regular showers after 2 weeks, unless otherwise indicated at your follow-up visit.   Do not submerge the incision in a tub, pool, or body of water for 6 weeks.   Avoid using deodorants, powders, creams, lotions, etc. on your incision for 6 weeks.   If you notice unusual swelling, redness, drainage, have more device site pain, chest pain, shortness of breath, or have a fever greater than 100 degrees, call our device clinic immediately: [phone] or [phone] during normal office hours. You may call [phone] after-hours or on weekends and ask for the electrophysiologist on call.  Activity    If you only had a battery/generator change performed, there are no postoperative activity restrictions.    If this is your first device or if you had new wires added to your  existing device, then you will be discharged in a sling which you should wear continuously for 48 hours. After that, the sling should remain off during the day but should be worn at night for another 2-4 weeks (depending on how active a sleeper you are).   Do not raise your device-side arm above your shoulder for 6 weeks. Do not lift more than 5-10 lbs with your device-side arm for 6 weeks.    If you were driving prior to the procedure, you may resume driving after your first follow-up appointment (1-2 weeks). If you have a history of passing out or a history of certain arrhythmias, there may be driving restrictions unrelated to the procedure. Please clarify with your physician if this is the case.   No heavy activity with the affected arm for 6 weeks (eg. tennis, golf, bowling, aerobics, mowing the lawn, etc.).   Avoid rough contact at the device site for 6 weeks.   You may participate in sexual activity unless otherwise instructed.   You may return to work within 3-5 days unless told otherwise, provided you adhere to the above activity restrictions.  Long-Term Instructions   Keep your pacemaker or defibrillator identification card with you at all times.   If you have a defibrillator and you get shocked by the device: If you receive one shock and you feel ok, you may call [phone] or [phone] during normal office hours. You may call [phone] after-hours. If you receive one shock and you do not feel well, call Emergency Medical Services. They will take you to the nearest emergency room.   If you have a defibrillator and you get more than one shock from the device or multiple shocks in a short period of time: Call Emergency Medical Services. They will take you to the nearest emergency room.   Appliances: You may operate any electrical device in your home, including microwaves.   Security Systems: Electromagnetic security systems can be located in the workplace, courthouses, or other  high-security areas. Exposure to this type of security system has been shown to cause interference in some cases. Interference may be related to the duration of exposure and/or the distance between the device and the security device. You should be aware of the location of security systems, move through them at a normal pace, and avoid leaning or standing too close.   Metal Detectors at Airports: Metal detectors at airports can potentially interfere with pacemakers or defibrillators, although this is unlikely. Metal detectors will likely be triggered by your device and therefore at places such as airports  it will be important for you to carry your identification card for the pacemaker/defibrillator. Airport personnel will likely prefer to do a manual search.   Cellular Phones: It is unlikely that using a cellular phone will interfere with your device. It should be used with the hand opposite to the side where your device was implanted. The phone should not be carried in the shirt pocket on the same side as the device.   Specific Work Conditions: Patients who work near high-voltage lines, transmitting towers, large motors, welding equipment, or powerful magnets should discuss their specific situation with their physician. In general, remain at least two feet from external electrical equipment, verify that the equipment is properly grounded, and wear insulated gloves when using electrical devices. Leave the immediate location if lightheadedness or other symptoms develop.   MRI: Some pacemakers and defibrillators are safe in MRI scanners, while others are not. Please consult with your physician to see if you have an MRI-compatible device.   Surgery: Should you require surgery in the future, some electrosurgical devices can interfere with your device function. You should discuss this with your surgeon before any operation.   Radiation Therapy: If you ever require radiation therapy in the future, care must be taken  to avoid irradiating the device.  Long-Term Follow-Up   Your device has the ability to transmit device information from home to the doctors office using a home monitoring system.   This remote system takes the place of a doctors visit. Your device will be checked from home every 3-6 months. Every 6-12 months, you will be asked to come into the office for an in-office check.   Your device should last in the range of 6-12 years. This depends on many factors including how often it paces the heart.   When the battery is low, a generator change will be performed. This is a same-day procedure with no post-op activity restrictions, unless one of the pacemaker or defibrillator leads needs to be replaced at that time, or a new lead is added to your existing system.

## 2017-10-03 NOTE — PROGRESS NOTES
EXTRACTION/ IMPLANTABLE DEVICE EDUCATION CHECKLIST    10-10-17 -Labs  PRE - PROCEDURE LABS HAVE BEEN ORDERED FOR YOU @ [facility]  (YOU DO NOT HAVE TO FAST FOR THIS LABWORK!!!!)    10-12-17 **Contrast Prep**  You will need to take medications for your allergy to iodine before your procedure .  A prescription has been sent to [facility] for Prednisone 50 mg, Tagamet 300 mg, and Benadryl 50 mg.  Take 1 tab of each medication at 6 pm & 11 pm on night before your procedure then 1 tab at 6 am on the day of your procedure.    10-13-17 @ 8 AM  REPORT TO CARDIOLOGY WAITING ROOM ON 3RD FLOOR OF HOSPITAL (DO NOT REPORT TO CLINIC)  Directions for Reporting to Cardiology Waiting Area in the Hospital  If you park in the Parking Garage:  Take elevators to the 2nd floor  Walk up ramp and turn right by Gold Elevators  Take elevator to the 3rd floor  Upon exiting the elevator, turn away from the clinic areas  Walk long bahena around to front of hospital to area with windows overlooking [facility]  Check in at Reception Desk  OR  If family is dropping you off:  Have them drop you off at the front of the Hospital  (Near the ER, where all the flags are hung).  Take the E elevators to the 3rd floor.  Check in at the Reception Desk in the waiting room.    WASH YOUR CHEST WITH HIBICLENS OR AN ANTIBACTERIAL SOAP (SUCH AS DIAL) ON THE NIGHT BEFORE AND THE MORNING OF YOUR PROCEDURE.    DO NOT EAT OR DRINK ANYTHING AFTER: 12 Midnight ON THE NIGHT BEFORE YOUR PROCEDURE    MEDICATIONS:  HOLD your chlorthalidone (Hygroten) on the morning of your procedure.    YOU MAY TAKE YOUR OTHER USUAL MORNING MEDICATIONS WITH A SIP OF WATER (including your Coumadin).    YOU WILL BE GOING HOME AFTER YOUR PROCEDURE  YOU WILL NEED SOMEONE TO DRIVE YOU HOME AFTER YOUR PROCEDURE    YOUR PAIN DURING YOUR PROCEDURE WILL BE MANAGED BY THE ANESTHESIA TEAM    THE ABOVE INSTRUCTIONS WERE GIVEN TO THE PATIENT VERBALLY AND THEY VERBALIZED UNDERSTANDING.  THEY DO NOT REQUIRE ANY SPECIAL NEEDS AND DO NOT HAVE ANY LEARNING BARRIERS.     Any need to reschedule or cancel procedures, or any questions regarding your procedures should be addressed directly with the Arrhythmia Department Nurses at the following phone number: [phone]

## 2017-10-03 NOTE — PROGRESS NOTES
Message sent to [facility]:  From: Ashlyn Lang RN  Sent: 10/3/2017   9:48 AM  To: [facility] Coumad Staff    Pt scheduled for ICD Bi-V upgrade with Dr. Mejia on 10-13-17, please keep INR 2-2.4.

## 2017-10-04 ENCOUNTER — OFFICE VISIT (OUTPATIENT)
Dept: PSYCHIATRY | Facility: CLINIC | Age: 52
End: 2017-10-04
Payer: MEDICARE

## 2017-10-04 ENCOUNTER — CLINICAL SUPPORT (OUTPATIENT)
Dept: ELECTROPHYSIOLOGY | Facility: CLINIC | Age: 52
End: 2017-10-04
Payer: MEDICARE

## 2017-10-04 VITALS
HEIGHT: 64 IN | DIASTOLIC BLOOD PRESSURE: 85 MMHG | BODY MASS INDEX: 45.41 KG/M2 | WEIGHT: 266 LBS | HEART RATE: 65 BPM | SYSTOLIC BLOOD PRESSURE: 161 MMHG

## 2017-10-04 DIAGNOSIS — G47.00 INSOMNIA, UNSPECIFIED TYPE: ICD-10-CM

## 2017-10-04 DIAGNOSIS — F33.1 DEPRESSION, MAJOR, RECURRENT, MODERATE: Primary | ICD-10-CM

## 2017-10-04 DIAGNOSIS — Z95.810 IMPLANTABLE CARDIOVERTER-DEFIBRILLATOR (ICD) IN SITU: ICD-10-CM

## 2017-10-04 DIAGNOSIS — I44.2 CHB (COMPLETE HEART BLOCK): ICD-10-CM

## 2017-10-04 DIAGNOSIS — F41.9 ANXIETY: ICD-10-CM

## 2017-10-04 PROCEDURE — 93295 DEV INTERROG REMOTE 1/2/MLT: CPT | Mod: ,,, | Performed by: INTERNAL MEDICINE

## 2017-10-04 PROCEDURE — 93296 REM INTERROG EVL PM/IDS: CPT | Mod: PBBFAC | Performed by: INTERNAL MEDICINE

## 2017-10-04 PROCEDURE — 90833 PSYTX W PT W E/M 30 MIN: CPT | Mod: PBBFAC | Performed by: NURSE PRACTITIONER

## 2017-10-04 PROCEDURE — 99213 OFFICE O/P EST LOW 20 MIN: CPT | Mod: S$PBB,,, | Performed by: NURSE PRACTITIONER

## 2017-10-04 PROCEDURE — 99999 PR PBB SHADOW E&M-EST. PATIENT-LVL II: CPT | Mod: PBBFAC,,, | Performed by: NURSE PRACTITIONER

## 2017-10-04 PROCEDURE — 99212 OFFICE O/P EST SF 10 MIN: CPT | Mod: PBBFAC,25 | Performed by: NURSE PRACTITIONER

## 2017-10-04 PROCEDURE — 90833 PSYTX W PT W E/M 30 MIN: CPT | Mod: S$PBB,,, | Performed by: NURSE PRACTITIONER

## 2017-10-04 RX ORDER — ARIPIPRAZOLE 5 MG/1
5 TABLET ORAL NIGHTLY
Qty: 30 TABLET | Refills: 5 | Status: SHIPPED | OUTPATIENT
Start: 2017-10-04 | End: 2018-01-03 | Stop reason: SDUPTHER

## 2017-10-04 RX ORDER — CLONAZEPAM 1 MG/1
1 TABLET ORAL DAILY PRN
Qty: 30 TABLET | Refills: 5 | Status: SHIPPED | OUTPATIENT
Start: 2017-10-04 | End: 2018-01-03 | Stop reason: SDUPTHER

## 2017-10-04 RX ORDER — ZOLPIDEM TARTRATE 10 MG/1
10 TABLET ORAL NIGHTLY PRN
Qty: 30 TABLET | Refills: 5 | Status: SHIPPED | OUTPATIENT
Start: 2017-10-04 | End: 2018-01-03

## 2017-10-04 RX ORDER — DULOXETIN HYDROCHLORIDE 60 MG/1
60 CAPSULE, DELAYED RELEASE ORAL 2 TIMES DAILY
Qty: 60 CAPSULE | Refills: 5 | Status: SHIPPED | OUTPATIENT
Start: 2017-10-04 | End: 2018-01-03 | Stop reason: SDUPTHER

## 2017-10-04 NOTE — PROGRESS NOTES
"Outpatient Psychiatry Follow-Up Visit (MD/NP)    10/4/2017    Clinical Status of Patient:  Outpatient (Ambulatory)    Chief Complaint:  Amna Chawla is a 52 y.o. female who presents today for follow-up of depression and anxiety.  Met with patient.      Interval History and Content of Current Session:  Interim Events/Subjective Report/Content of Current Session:     Current Medication Profile for Psych  · Abilify 5mg po q day (pt failed on 10 mg due to oversedation)  · Cymbalta 60mg po bid  · Ambien 10mg po q hs  · Klonopin 0.5 mg po daily PRN severe anxiety  · Also taking Neurontin and Topamax from Neurology    Pt reports having a verbal argument with family last Friday. Pt reports that she walked away and took herself out of the situation before her anger escalated.  Pt reports low frustration tolerance for other people.  Stated,"My socialization with people is not good".  Pt has history of conflict with her oldest daughter's fimaliae.  His daughter was laughing during the argument which triggered her anger further.  Pt reports that their wedding is this Sunday and she plans to avoid conflict.  Pt compliant with Klonopin but feels little efficacy.  Denies side effects.  Will increase to Klonopin 1 mg as needed.  Pt also inquired about Rexaulti.  Discussed expectations for medication.  Pt also has surgery scheduled on 10/18 for cardiac ICD device.     Pt stated that she may start attending a support group for adults with brain injuries that meet at [facility].    Psychotherapy:  · Target symptoms: depression, anxiety   · Why chosen therapy is appropriate versus another modality: relevant to diagnosis  · Outcome monitoring methods: self-report, observation  · Therapeutic intervention type: insight oriented psychotherapy  · Topics discussed/themes: relationships difficulties, parenting issues, stress related to medical comorbidities, difficulty managing affect in interpersonal relationships, building " "skills sets for symptom management, symptom recognition  · The patient's response to the intervention is accepting. The patient's progress toward treatment goals is fair.   · Duration of intervention: 20 minutes.    Review of Systems   · PSYCHIATRIC: Pertinant items are noted in the narrative.  · CONSTITUTIONAL: No weight gain or loss.   · ENDOCRINE: No polydipsia or polyuria.  · INTEGUMENTARY: No rashes or lacerations.  · EYES: No exophthalmos, jaundice or blindness.  · ENT: No dizziness, tinnitus or hearing loss.  · RESPIRATORY: No shortness of breath.  · GASTROINTESTINAL: No nausea, vomiting, pain, constipation or diarrhea.  · GENITOURINARY: No frequency, dysuria or sexual dysfunction.  · HEMATOLOGIC/LYMPHATIC: No excessive bleeding, prolonged or excessive bleeding after dental extraction/injury.  · ALLERGIC/IMMUNOLOGIC: No allergic response to materials, foods or animals at this time.    Past Medical, Family and Social History: The patient's past medical, family and social history have been reviewed and updated as appropriate within the electronic medical record - see encounter notes.    Compliance: yes    Side effects: None    Risk Parameters:  Patient reports no suicidal ideation  Patient reports no homicidal ideation  Patient reports no self-injurious behavior  Patient reports no violent behavior    Exam (detailed: at least 9 elements; comprehensive: all 15 elements)   Constitutional  Vitals:  Most recent vital signs, dated less than 90 days prior to this appointment, were reviewed.   Vitals:    10/04/17 1007   BP: (!) 161/85   Pulse: 65   Weight: 120.7 kg (266 lb)   Height: 5' 4" (1.626 m)        General:  unremarkable, age appropriate     Musculoskeletal  Muscle Strength/Tone:  no dystonia, no tremor, no tic   Gait & Station:  non-ataxic     Psychiatric  Speech:  no latency; no press   Mood & Affect:  dysthymic, irritable  irritable   Thought Process:  normal and logical   Associations:  intact   Thought " Content:  normal, no suicidality, no homicidality, delusions, or paranoia   Insight:  has awareness of illness   Judgement: behavior is adequate to circumstances, age appropriate   Orientation:  grossly intact, person, place, situation, time/date   Memory: intact for content of interview, able to remember recent events- yes, able to remember remote events- yes   Language: grossly intact   Attention Span & Concentration:  able to focus   Fund of Knowledge:  intact and appropriate to age and level of education, familiar with aspects of current personal life     Assessment and Diagnosis   Status/Progress: Based on the examination today, the patient's problem(s) is/are adequately but not ideally controlled.  New problems have not been presented today.   Co-morbidities and Lack of compliance are not complicating management of the primary condition.  There are no active rule-out diagnoses for this patient at this time.     General Impression:       ICD-10-CM ICD-9-CM   1. Depression, major, recurrent, moderate F33.1 296.32   2. Insomnia, unspecified type G47.00 780.52   3. Anxiety F41.9 300.00       Intervention/Counseling/Treatment Plan   · Medication Management: The risks and benefits of medication were discussed with the patient.   · Continue Cymbalta 60 mg po BID  · Continue Abilify 5 mg po daily  · Continue Ambien 10 mg po q hs PRN insomnia  · Increase Klonopin 1 mg po daily PRN severe anxiety  · Continue individual therapy with Dr. Rosales      Return to Clinic: 3 months

## 2017-10-06 ENCOUNTER — TELEPHONE (OUTPATIENT)
Dept: ELECTROPHYSIOLOGY | Facility: CLINIC | Age: 52
End: 2017-10-06

## 2017-10-06 NOTE — TELEPHONE ENCOUNTER
----- Message from Sosa Edwards sent at 10/5/2017  1:34 PM CDT -----  Contact: patient  Please call pt at [phone]. Patient has a device question    Thank you

## 2017-10-07 ENCOUNTER — NURSE TRIAGE (OUTPATIENT)
Dept: ADMINISTRATIVE | Facility: CLINIC | Age: 52
End: 2017-10-07

## 2017-10-07 NOTE — TELEPHONE ENCOUNTER
"  Reason for Disposition   [1] SEVERE pain (e.g., excruciating) AND [2] not improved after 2 hours of pain medicine    Answer Assessment - Initial Assessment Questions  1. ONSET: "When did the pain start?" (e.g., minutes, hours, days)      yesterday  2. ONSET: "Does the pain come and go, or has it been constant since it started?" (e.g., constant, intermittent, fleeting)      Comes and goes  3. SEVERITY: "How bad is the pain?"   (Scale 1-10; mild, moderate or severe)    - MILD (1-3): doesn't interfere with normal activities     - MODERATE (4-7): interferes with normal activities or awakens from sleep     - SEVERE (8-10): excruciating pain, unable to do any normal activities       10+  4. LOCATION: "Where does it hurt?"       tmj on the right side  5. RASH: "Is there any redness, rash, or swelling of the face?"      no  6. FEVER: "Do you have a fever?" If so, ask: "What is it, how was it measured, and when did it start?"       no  7. OTHER SYMPTOMS: "Do you have any other symptoms?" (e.g., fever, toothache, nasal discharge, nasal congestion, clicking sensation in jaw joint)      Jaw and headache patient with TMJ  8. PREGNANCY: "Is there any chance you are pregnant?" "When was your last menstrual period?"      menopause    Protocols used:  FACE PAIN-A-    "

## 2017-10-10 ENCOUNTER — ANTI-COAG VISIT (OUTPATIENT)
Dept: CARDIOLOGY | Facility: CLINIC | Age: 52
End: 2017-10-10
Payer: MEDICARE

## 2017-10-10 ENCOUNTER — LAB VISIT (OUTPATIENT)
Dept: LAB | Facility: HOSPITAL | Age: 52
End: 2017-10-10
Attending: INTERNAL MEDICINE
Payer: MEDICARE

## 2017-10-10 ENCOUNTER — OUTPATIENT CASE MANAGEMENT (OUTPATIENT)
Dept: ADMINISTRATIVE | Facility: OTHER | Age: 52
End: 2017-10-10

## 2017-10-10 DIAGNOSIS — I42.8 NONISCHEMIC CARDIOMYOPATHY: ICD-10-CM

## 2017-10-10 DIAGNOSIS — I49.9 CARDIAC ARRHYTHMIA, UNSPECIFIED CARDIAC ARRHYTHMIA TYPE: ICD-10-CM

## 2017-10-10 DIAGNOSIS — I48.0 PAROXYSMAL ATRIAL FIBRILLATION: ICD-10-CM

## 2017-10-10 DIAGNOSIS — Z79.01 LONG-TERM (CURRENT) USE OF ANTICOAGULANTS: Primary | ICD-10-CM

## 2017-10-10 LAB
ANION GAP SERPL CALC-SCNC: 4 MMOL/L
APTT BLDCRRT: 29.5 SEC
BASOPHILS # BLD AUTO: 0 K/UL
BASOPHILS NFR BLD: 0 %
BUN SERPL-MCNC: 13 MG/DL
CALCIUM SERPL-MCNC: 9.3 MG/DL
CHLORIDE SERPL-SCNC: 108 MMOL/L
CO2 SERPL-SCNC: 27 MMOL/L
CREAT SERPL-MCNC: 0.9 MG/DL
DIFFERENTIAL METHOD: ABNORMAL
EOSINOPHIL # BLD AUTO: 0 K/UL
EOSINOPHIL NFR BLD: 1.2 %
ERYTHROCYTE [DISTWIDTH] IN BLOOD BY AUTOMATED COUNT: 16.4 %
EST. GFR  (AFRICAN AMERICAN): >60 ML/MIN/1.73 M^2
EST. GFR  (NON AFRICAN AMERICAN): >60 ML/MIN/1.73 M^2
GLUCOSE SERPL-MCNC: 95 MG/DL
HCT VFR BLD AUTO: 36.6 %
HGB BLD-MCNC: 12.4 G/DL
INR PPP: 2.4
INR PPP: 2.6 (ref 2–3)
LYMPHOCYTES # BLD AUTO: 1 K/UL
LYMPHOCYTES NFR BLD: 41.5 %
MCH RBC QN AUTO: 29.2 PG
MCHC RBC AUTO-ENTMCNC: 33.9 G/DL
MCV RBC AUTO: 86 FL
MONOCYTES # BLD AUTO: 0.2 K/UL
MONOCYTES NFR BLD: 8.5 %
NEUTROPHILS # BLD AUTO: 1.2 K/UL
NEUTROPHILS NFR BLD: 48.8 %
PLATELET # BLD AUTO: 189 K/UL
PMV BLD AUTO: 11.9 FL
POTASSIUM SERPL-SCNC: 4.1 MMOL/L
PROTHROMBIN TIME: 23.6 SEC
RBC # BLD AUTO: 4.25 M/UL
SODIUM SERPL-SCNC: 139 MMOL/L
WBC # BLD AUTO: 2.46 K/UL

## 2017-10-10 PROCEDURE — 80048 BASIC METABOLIC PNL TOTAL CA: CPT

## 2017-10-10 PROCEDURE — 85610 PROTHROMBIN TIME: CPT

## 2017-10-10 PROCEDURE — 99211 OFF/OP EST MAY X REQ PHY/QHP: CPT | Mod: PBBFAC | Performed by: PHARMACIST

## 2017-10-10 PROCEDURE — 85025 COMPLETE CBC W/AUTO DIFF WBC: CPT

## 2017-10-10 PROCEDURE — 85730 THROMBOPLASTIN TIME PARTIAL: CPT

## 2017-10-10 PROCEDURE — 36415 COLL VENOUS BLD VENIPUNCTURE: CPT

## 2017-10-10 PROCEDURE — 99999 PR PBB SHADOW E&M-EST. PATIENT-LVL I: CPT | Mod: PBBFAC,,, | Performed by: PHARMACIST

## 2017-10-10 PROCEDURE — 85610 PROTHROMBIN TIME: CPT | Mod: PBBFAC | Performed by: PHARMACIST

## 2017-10-10 NOTE — PROGRESS NOTES
Ms. Johnson called and asked to speak to Vianey Barnard RN in [employer]. Patient stated she having her pacemaker replaced on Friday and will not be able to use her left arm after the procedure. Patient wanted to know if [employer] could help her get a shower/tub bench and to see if she qualified for someone to come out and bathe her since she will not be able to do it herself. This [facility] has forwarded this message to Adriana Sloan RN Supervisor in [employer] for further review.     Mena  [facility] [id number]

## 2017-10-10 NOTE — PROGRESS NOTES
Patient will undergo ICD Bi-V upgrade - 10/13/17. We were asked to please keep INR 2-2.4.*  INR slightly elevated from this target range; therefore we will make a slight reduction in her dose for tomorrow. Per patient, she was advised that she will remain inpatient 2-3 days.

## 2017-10-11 ENCOUNTER — TELEPHONE (OUTPATIENT)
Dept: ELECTROPHYSIOLOGY | Facility: CLINIC | Age: 52
End: 2017-10-11

## 2017-10-11 ENCOUNTER — PATIENT MESSAGE (OUTPATIENT)
Dept: MEDSURG UNIT | Facility: HOSPITAL | Age: 52
End: 2017-10-11

## 2017-10-11 NOTE — TELEPHONE ENCOUNTER
----- Message from Sosa Edwards sent at 10/11/2017 12:25 PM CDT -----  Contact: patient  Please call pt at [phone]. She received several notices with different check in times for the ICD Insertion procedure scheduled 10/13/17 with Dr Mejia. Please clarify the time with pt    Thank you

## 2017-10-11 NOTE — TELEPHONE ENCOUNTER
Spoke with patient and advised her to be here for 8am on 10/13/2017. She verbalizes understanding.

## 2017-10-12 ENCOUNTER — TELEPHONE (OUTPATIENT)
Dept: ELECTROPHYSIOLOGY | Facility: CLINIC | Age: 52
End: 2017-10-12

## 2017-10-12 ENCOUNTER — OUTPATIENT CASE MANAGEMENT (OUTPATIENT)
Dept: ADMINISTRATIVE | Facility: OTHER | Age: 52
End: 2017-10-12

## 2017-10-12 NOTE — TELEPHONE ENCOUNTER
Spoke with patient to review pre-op instructions for Venogram and ICD upgrade scheduled for tomorrow. She is allergic to Iodine and will need the contrast prep. She states she has already picked up rx and we reviewed how to take beginning at 6pm tonight, 11pm tonight, and at 6am tomorrow. She is to fast after 12mn and will be here at 8am. She will hold her Chlorthalidone in am. She was also reminded to use the Hibiclens this evening and in the am. She verbalizes understanding of all instructions and verbalizes no other questions or concerns.

## 2017-10-12 NOTE — PROGRESS NOTES
10-12-17--Patient called she expressed that she needs help. Patient states she received my letter from when we tried to enroll her with [employer]. Patient sates she is having a pacemaker implanted on tomorrow 10-13-17.  Patients is requesting a shower chair and home health for nursing aid to assist with bathing. I explain that insurance companies do not pay for shower chairs and this is something that she would have to purchase out of pocket. Patient verbalizes understanding. I will send a message to Dr.Paul Mejia to alert that patient is requesting  for nursing aid to assist with bathing. Madison WALLACE CCM

## 2017-10-13 ENCOUNTER — ANESTHESIA EVENT (OUTPATIENT)
Dept: MEDSURG UNIT | Facility: HOSPITAL | Age: 52
End: 2017-10-13
Payer: MEDICARE

## 2017-10-13 ENCOUNTER — ANESTHESIA (OUTPATIENT)
Dept: MEDSURG UNIT | Facility: HOSPITAL | Age: 52
End: 2017-10-13
Payer: MEDICARE

## 2017-10-13 ENCOUNTER — HOSPITAL ENCOUNTER (OUTPATIENT)
Facility: HOSPITAL | Age: 52
Discharge: HOME | End: 2017-10-14
Attending: INTERNAL MEDICINE | Admitting: INTERNAL MEDICINE
Payer: MEDICARE

## 2017-10-13 DIAGNOSIS — I49.9 ARRHYTHMIA: ICD-10-CM

## 2017-10-13 DIAGNOSIS — I42.9 CARDIOMYOPATHY: Primary | ICD-10-CM

## 2017-10-13 DIAGNOSIS — Z95.810 PRESENCE OF AUTOMATIC IMPLANTABLE CARDIOVERTER-DEFIBRILLATOR: ICD-10-CM

## 2017-10-13 DIAGNOSIS — I42.8 NONISCHEMIC CARDIOMYOPATHY: ICD-10-CM

## 2017-10-13 LAB
INR PPP: 2
INR PPP: 2
PROTHROMBIN TIME: 19.6 SEC
PROTHROMBIN TIME: 19.6 SEC

## 2017-10-13 PROCEDURE — 63600175 PHARM REV CODE 636 W HCPCS: Performed by: ANESTHESIOLOGY

## 2017-10-13 PROCEDURE — 63600175 PHARM REV CODE 636 W HCPCS: Performed by: NURSE PRACTITIONER

## 2017-10-13 PROCEDURE — 37000008 HC ANESTHESIA 1ST 15 MINUTES: Performed by: INTERNAL MEDICINE

## 2017-10-13 PROCEDURE — 93005 ELECTROCARDIOGRAM TRACING: CPT | Mod: 59

## 2017-10-13 PROCEDURE — 25000003 PHARM REV CODE 250: Performed by: STUDENT IN AN ORGANIZED HEALTH CARE EDUCATION/TRAINING PROGRAM

## 2017-10-13 PROCEDURE — 93010 ELECTROCARDIOGRAM REPORT: CPT | Mod: 76,,, | Performed by: INTERNAL MEDICINE

## 2017-10-13 PROCEDURE — 33264 RMVL & RPLCMT DFB GEN MLT LD: CPT | Mod: ,,, | Performed by: INTERNAL MEDICINE

## 2017-10-13 PROCEDURE — 25500020 PHARM REV CODE 255: Performed by: NURSE ANESTHETIST, CERTIFIED REGISTERED

## 2017-10-13 PROCEDURE — 25000003 PHARM REV CODE 250: Performed by: INTERNAL MEDICINE

## 2017-10-13 PROCEDURE — 63600175 PHARM REV CODE 636 W HCPCS

## 2017-10-13 PROCEDURE — 63600175 PHARM REV CODE 636 W HCPCS: Performed by: NURSE ANESTHETIST, CERTIFIED REGISTERED

## 2017-10-13 PROCEDURE — 25000003 PHARM REV CODE 250: Performed by: NURSE PRACTITIONER

## 2017-10-13 PROCEDURE — C1892 INTRO/SHEATH,FIXED,PEEL-AWAY: HCPCS

## 2017-10-13 PROCEDURE — 27100025 HC TUBING, SET FLUID WARMER: Performed by: NURSE ANESTHETIST, CERTIFIED REGISTERED

## 2017-10-13 PROCEDURE — D9220A PRA ANESTHESIA: Mod: CRNA,,, | Performed by: NURSE ANESTHETIST, CERTIFIED REGISTERED

## 2017-10-13 PROCEDURE — 93010 ELECTROCARDIOGRAM REPORT: CPT | Mod: ,,, | Performed by: INTERNAL MEDICINE

## 2017-10-13 PROCEDURE — 85610 PROTHROMBIN TIME: CPT

## 2017-10-13 PROCEDURE — C1730 CATH, EP, 19 OR FEW ELECT: HCPCS

## 2017-10-13 PROCEDURE — 37000009 HC ANESTHESIA EA ADD 15 MINS: Performed by: INTERNAL MEDICINE

## 2017-10-13 PROCEDURE — D9220A PRA ANESTHESIA: Mod: ANES,,, | Performed by: ANESTHESIOLOGY

## 2017-10-13 PROCEDURE — 27000221 HC OXYGEN, UP TO 24 HOURS

## 2017-10-13 PROCEDURE — 33225 L VENTRIC PACING LEAD ADD-ON: CPT | Mod: ,,, | Performed by: INTERNAL MEDICINE

## 2017-10-13 RX ORDER — CARVEDILOL 25 MG/1
25 TABLET ORAL 2 TIMES DAILY WITH MEALS
Status: DISCONTINUED | OUTPATIENT
Start: 2017-10-13 | End: 2017-10-14 | Stop reason: HOSPADM

## 2017-10-13 RX ORDER — ATORVASTATIN CALCIUM 20 MG/1
40 TABLET, FILM COATED ORAL EVERY MORNING
Status: DISCONTINUED | OUTPATIENT
Start: 2017-10-13 | End: 2017-10-14 | Stop reason: HOSPADM

## 2017-10-13 RX ORDER — MEPERIDINE HYDROCHLORIDE 50 MG/ML
12.5 INJECTION INTRAMUSCULAR; INTRAVENOUS; SUBCUTANEOUS ONCE AS NEEDED
Status: ACTIVE | OUTPATIENT
Start: 2017-10-13 | End: 2017-10-13

## 2017-10-13 RX ORDER — PROPOFOL 10 MG/ML
VIAL (ML) INTRAVENOUS
Status: DISCONTINUED | OUTPATIENT
Start: 2017-10-13 | End: 2017-10-13

## 2017-10-13 RX ORDER — TOPIRAMATE 200 MG/1
200 TABLET ORAL 2 TIMES DAILY
Status: DISCONTINUED | OUTPATIENT
Start: 2017-10-13 | End: 2017-10-14 | Stop reason: HOSPADM

## 2017-10-13 RX ORDER — SODIUM CHLORIDE 0.9 % (FLUSH) 0.9 %
3 SYRINGE (ML) INJECTION
Status: DISCONTINUED | OUTPATIENT
Start: 2017-10-13 | End: 2017-10-14 | Stop reason: HOSPADM

## 2017-10-13 RX ORDER — ARIPIPRAZOLE 5 MG/1
5 TABLET ORAL NIGHTLY
Status: DISCONTINUED | OUTPATIENT
Start: 2017-10-13 | End: 2017-10-14 | Stop reason: HOSPADM

## 2017-10-13 RX ORDER — LIDOCAINE HCL/PF 100 MG/5ML
SYRINGE (ML) INTRAVENOUS
Status: DISCONTINUED | OUTPATIENT
Start: 2017-10-13 | End: 2017-10-13

## 2017-10-13 RX ORDER — FENTANYL CITRATE 50 UG/ML
25 INJECTION, SOLUTION INTRAMUSCULAR; INTRAVENOUS EVERY 5 MIN PRN
Status: DISCONTINUED | OUTPATIENT
Start: 2017-10-13 | End: 2017-10-14 | Stop reason: HOSPADM

## 2017-10-13 RX ORDER — SODIUM CHLORIDE 9 MG/ML
INJECTION, SOLUTION INTRAVENOUS CONTINUOUS
Status: DISCONTINUED | OUTPATIENT
Start: 2017-10-13 | End: 2017-10-14 | Stop reason: HOSPADM

## 2017-10-13 RX ORDER — WARFARIN 10 MG/1
10 TABLET ORAL DAILY
Status: DISCONTINUED | OUTPATIENT
Start: 2017-10-13 | End: 2017-10-14 | Stop reason: HOSPADM

## 2017-10-13 RX ORDER — FENTANYL CITRATE 50 UG/ML
INJECTION, SOLUTION INTRAMUSCULAR; INTRAVENOUS
Status: DISCONTINUED | OUTPATIENT
Start: 2017-10-13 | End: 2017-10-13

## 2017-10-13 RX ORDER — DOXYCYCLINE HYCLATE 100 MG
100 TABLET ORAL EVERY 12 HOURS
Status: DISCONTINUED | OUTPATIENT
Start: 2017-10-14 | End: 2017-10-14 | Stop reason: HOSPADM

## 2017-10-13 RX ORDER — ONDANSETRON 4 MG/1
4 TABLET, ORALLY DISINTEGRATING ORAL EVERY 6 HOURS PRN
Status: DISCONTINUED | OUTPATIENT
Start: 2017-10-13 | End: 2017-10-14 | Stop reason: HOSPADM

## 2017-10-13 RX ORDER — GABAPENTIN 300 MG/1
900 CAPSULE ORAL 3 TIMES DAILY
Status: DISCONTINUED | OUTPATIENT
Start: 2017-10-13 | End: 2017-10-14 | Stop reason: HOSPADM

## 2017-10-13 RX ORDER — DONEPEZIL HYDROCHLORIDE 5 MG/1
10 TABLET, FILM COATED ORAL 2 TIMES DAILY
Status: DISCONTINUED | OUTPATIENT
Start: 2017-10-13 | End: 2017-10-14 | Stop reason: HOSPADM

## 2017-10-13 RX ORDER — DULOXETIN HYDROCHLORIDE 60 MG/1
60 CAPSULE, DELAYED RELEASE ORAL 2 TIMES DAILY
Status: DISCONTINUED | OUTPATIENT
Start: 2017-10-13 | End: 2017-10-14 | Stop reason: HOSPADM

## 2017-10-13 RX ORDER — TRAZODONE HYDROCHLORIDE 100 MG/1
200 TABLET ORAL NIGHTLY
Status: DISCONTINUED | OUTPATIENT
Start: 2017-10-13 | End: 2017-10-14 | Stop reason: HOSPADM

## 2017-10-13 RX ORDER — HYDROCODONE BITARTRATE AND ACETAMINOPHEN 5; 325 MG/1; MG/1
1 TABLET ORAL EVERY 6 HOURS PRN
Status: DISCONTINUED | OUTPATIENT
Start: 2017-10-13 | End: 2017-10-14 | Stop reason: HOSPADM

## 2017-10-13 RX ORDER — MIDAZOLAM HYDROCHLORIDE 1 MG/ML
INJECTION, SOLUTION INTRAMUSCULAR; INTRAVENOUS
Status: DISCONTINUED | OUTPATIENT
Start: 2017-10-13 | End: 2017-10-13

## 2017-10-13 RX ORDER — PANTOPRAZOLE SODIUM 40 MG/1
40 TABLET, DELAYED RELEASE ORAL DAILY
Status: DISCONTINUED | OUTPATIENT
Start: 2017-10-13 | End: 2017-10-14 | Stop reason: HOSPADM

## 2017-10-13 RX ORDER — TIAGABINE HYDROCHLORIDE 4 MG/1
4 TABLET, FILM COATED ORAL NIGHTLY
Status: DISCONTINUED | OUTPATIENT
Start: 2017-10-13 | End: 2017-10-14 | Stop reason: HOSPADM

## 2017-10-13 RX ORDER — ACETAMINOPHEN 10 MG/ML
1000 INJECTION, SOLUTION INTRAVENOUS EVERY 8 HOURS
Status: DISCONTINUED | OUTPATIENT
Start: 2017-10-13 | End: 2017-10-14 | Stop reason: HOSPADM

## 2017-10-13 RX ORDER — IODIXANOL 320 MG/ML
INJECTION, SOLUTION INTRAVASCULAR
Status: DISCONTINUED | OUTPATIENT
Start: 2017-10-13 | End: 2017-10-13

## 2017-10-13 RX ORDER — PROPOFOL 10 MG/ML
VIAL (ML) INTRAVENOUS CONTINUOUS PRN
Status: DISCONTINUED | OUTPATIENT
Start: 2017-10-13 | End: 2017-10-13

## 2017-10-13 RX ORDER — VALSARTAN 80 MG/1
80 TABLET ORAL DAILY
Qty: 90 TABLET | Refills: 3 | Status: SHIPPED | OUTPATIENT
Start: 2017-10-13 | End: 2018-07-20 | Stop reason: RX

## 2017-10-13 RX ORDER — VALSARTAN 80 MG/1
80 TABLET ORAL DAILY
Status: DISCONTINUED | OUTPATIENT
Start: 2017-10-13 | End: 2017-10-14 | Stop reason: HOSPADM

## 2017-10-13 RX ORDER — CHLORTHALIDONE 25 MG/1
25 TABLET ORAL DAILY
Status: DISCONTINUED | OUTPATIENT
Start: 2017-10-14 | End: 2017-10-14 | Stop reason: HOSPADM

## 2017-10-13 RX ORDER — ZOLPIDEM TARTRATE 10 MG/1
10 TABLET ORAL NIGHTLY PRN
Status: DISCONTINUED | OUTPATIENT
Start: 2017-10-13 | End: 2017-10-14 | Stop reason: HOSPADM

## 2017-10-13 RX ORDER — CLONAZEPAM 1 MG/1
1 TABLET ORAL DAILY PRN
Status: DISCONTINUED | OUTPATIENT
Start: 2017-10-13 | End: 2017-10-14 | Stop reason: HOSPADM

## 2017-10-13 RX ORDER — DOXYCYCLINE HYCLATE 100 MG
100 TABLET ORAL EVERY 12 HOURS
Qty: 10 TABLET | Refills: 0 | Status: SHIPPED | OUTPATIENT
Start: 2017-10-14 | End: 2017-10-19

## 2017-10-13 RX ORDER — SODIUM CHLORIDE 9 MG/ML
INJECTION, SOLUTION INTRAVENOUS CONTINUOUS
Status: DISCONTINUED | OUTPATIENT
Start: 2017-10-13 | End: 2017-10-13

## 2017-10-13 RX ORDER — ACETAMINOPHEN 325 MG/1
650 TABLET ORAL EVERY 6 HOURS PRN
Status: DISCONTINUED | OUTPATIENT
Start: 2017-10-13 | End: 2017-10-14 | Stop reason: HOSPADM

## 2017-10-13 RX ADMIN — PANTOPRAZOLE SODIUM 40 MG: 40 TABLET, DELAYED RELEASE ORAL at 04:10

## 2017-10-13 RX ADMIN — TOPIRAMATE 200 MG: 200 TABLET, FILM COATED ORAL at 09:10

## 2017-10-13 RX ADMIN — PROPOFOL 30 MG: 10 INJECTION, EMULSION INTRAVENOUS at 08:10

## 2017-10-13 RX ADMIN — IODIXANOL 10 ML: 320 INJECTION, SOLUTION INTRAVASCULAR at 07:10

## 2017-10-13 RX ADMIN — ONDANSETRON 4 MG: 4 TABLET, ORALLY DISINTEGRATING ORAL at 08:10

## 2017-10-13 RX ADMIN — ACETAMINOPHEN 650 MG: 325 TABLET ORAL at 12:10

## 2017-10-13 RX ADMIN — PROPOFOL 30 MG: 10 INJECTION, EMULSION INTRAVENOUS at 09:10

## 2017-10-13 RX ADMIN — Medication 1000 MG: at 10:10

## 2017-10-13 RX ADMIN — PROPOFOL 30 MCG/KG/MIN: 10 INJECTION, EMULSION INTRAVENOUS at 08:10

## 2017-10-13 RX ADMIN — HYDROCODONE BITARTRATE AND ACETAMINOPHEN 1 TABLET: 5; 325 TABLET ORAL at 04:10

## 2017-10-13 RX ADMIN — ARIPIPRAZOLE 5 MG: 5 TABLET ORAL at 09:10

## 2017-10-13 RX ADMIN — PROPOFOL 20 MG: 10 INJECTION, EMULSION INTRAVENOUS at 09:10

## 2017-10-13 RX ADMIN — CARVEDILOL 25 MG: 25 TABLET, FILM COATED ORAL at 04:10

## 2017-10-13 RX ADMIN — DONEPEZIL HYDROCHLORIDE 10 MG: 5 TABLET, FILM COATED ORAL at 06:10

## 2017-10-13 RX ADMIN — GABAPENTIN 900 MG: 300 CAPSULE ORAL at 09:10

## 2017-10-13 RX ADMIN — GABAPENTIN 900 MG: 300 CAPSULE ORAL at 04:10

## 2017-10-13 RX ADMIN — FENTANYL CITRATE 25 MCG: 50 INJECTION, SOLUTION INTRAMUSCULAR; INTRAVENOUS at 11:10

## 2017-10-13 RX ADMIN — LIDOCAINE HYDROCHLORIDE 100 MG: 20 INJECTION, SOLUTION INTRAVENOUS at 08:10

## 2017-10-13 RX ADMIN — FENTANYL CITRATE 25 MCG: 50 INJECTION INTRAMUSCULAR; INTRAVENOUS at 01:10

## 2017-10-13 RX ADMIN — FENTANYL CITRATE 100 MCG: 50 INJECTION, SOLUTION INTRAMUSCULAR; INTRAVENOUS at 10:10

## 2017-10-13 RX ADMIN — FENTANYL CITRATE 50 MCG: 50 INJECTION, SOLUTION INTRAMUSCULAR; INTRAVENOUS at 08:10

## 2017-10-13 RX ADMIN — VALSARTAN 80 MG: 80 TABLET ORAL at 04:10

## 2017-10-13 RX ADMIN — SODIUM CHLORIDE 1000 ML: 0.9 INJECTION, SOLUTION INTRAVENOUS at 06:10

## 2017-10-13 RX ADMIN — MIDAZOLAM 2 MG: 1 INJECTION INTRAMUSCULAR; INTRAVENOUS at 08:10

## 2017-10-13 RX ADMIN — Medication 1000 MG: at 08:10

## 2017-10-13 RX ADMIN — DULOXETINE 60 MG: 60 CAPSULE, DELAYED RELEASE ORAL at 09:10

## 2017-10-13 RX ADMIN — PROPOFOL 30 MG: 10 INJECTION, EMULSION INTRAVENOUS at 11:10

## 2017-10-13 RX ADMIN — WARFARIN SODIUM 10 MG: 10 TABLET ORAL at 04:10

## 2017-10-13 RX ADMIN — FENTANYL CITRATE 50 MCG: 50 INJECTION, SOLUTION INTRAMUSCULAR; INTRAVENOUS at 11:10

## 2017-10-13 NOTE — ANESTHESIA PREPROCEDURE EVALUATION
10/13/2017  Amna Chawla is a 52 y.o., female.    Pre-op Assessment    I have reviewed the Patient Summary Reports.     I have reviewed the Nursing Notes.   I have reviewed the Medications.     Review of Systems  Anesthesia Hx:  No problems with previous Anesthesia  History of prior surgery of interest to airway management or planning: Previous anesthesia: General 12/16 CTR, propofol qqt with general anesthesia.  Denies Family Hx of Anesthesia complications.   Denies Personal Hx of Anesthesia complications.   Social:  Non-Smoker    Hematology/Oncology:  Hematology Normal   Oncology Normal     Cardiovascular:   Pacemaker (Qnekt AICD) Hypertension, well controlled Denies CAD.   Dysrhythmias  CONCLUSIONS     1 - Mildly to moderately depressed left ventricular systolic function (EF 40-45%).     2 - Impaired LV relaxation, normal LAP (grade 1 diastolic dysfunction).     3 - Normal right ventricular systolic function .     4 - The estimated PA systolic pressure is greater than 26 mmHg.     5 - Trivial to mild mitral regurgitation.     6 - Trivial to mild tricuspid regurgitation.     7 - Severe left atrial enlargement.     8 - No wall motion abnormalities.    Pulmonary:   Asthma asymptomatic Past hx of asthma but no attacks in years   Renal/:  Renal/ Normal     Hepatic/GI:   GERD, well controlled    Musculoskeletal:   botox injections for cervical spasms and occipital headache Spine Disorders: cervical and lumbar    Neurological:   CVA, residual symptoms Seizures Left sided weakness post CVA 2013/anoxic brain injury    Last seizure was in 2014, then only partial   Endocrine:  Endocrine Normal        Physical Exam  General:  Morbid Obesity    Airway/Jaw/Neck:  Airway Findings: Mouth Opening: Normal Tongue: Large  General Airway Assessment: Adult  Mallampati: II  TM Distance: Normal, at least 6 cm          Dental:  Dental Findings: In tact             Anesthesia Plan  Type of Anesthesia, risks & benefits discussed:  Anesthesia Type:  general, MAC  Patient's Preference: either  Intra-op Monitoring Plan: standard ASA monitors  Intra-op Monitoring Plan Comments:   Post Op Pain Control Plan:   Post Op Pain Control Plan Comments:   Induction:    Beta Blocker:  Patient is not currently on a Beta-Blocker (No further documentation required).       Informed Consent: Patient understands risks and agrees with Anesthesia plan.  Questions answered. Anesthesia consent signed with patient.  ASA Score: 3     Day of Surgery Review of History & Physical:    H&P update referred to the surgeon.         Ready For Surgery From Anesthesia Perspective.

## 2017-10-13 NOTE — PLAN OF CARE
Received report from RODRIGO Mcdaniel. Patient s/p ppm, AAOx3. VSS, no c/o pain or discomfort at this time, resp even and unlabored. Gauze/tegaderm dressing to r groin is CDI. No active bleeding. No hematoma noted. Post procedure protocol reviewed with patient and patient's family. Understanding verbalized. Family members at bedside. Nurse call bell within reach. Will continue to monitor per post procedure protocol.

## 2017-10-13 NOTE — NURSING TRANSFER
Nursing Transfer Note      10/13/2017     Transfer To: [facility]     Transfer via stretcher    Transfer with cardiac monitoring    Transported by rn    Medicines sent: none    Chart send with patient: Yes    Notified: nurse    Patient reassessed at: see epic

## 2017-10-13 NOTE — PROGRESS NOTES
Report received from blanca shepherd. Care assumed see [facility] for complete assessment pacu bcg's maintained safety measures verified patient instructed on pain scale and patient verbalized understanding. pacu bcg's maintained safety measures verified.

## 2017-10-13 NOTE — TRANSFER OF CARE
"Anesthesia Transfer of Care Note    Patient: Amna Chawla    Procedure(s) Performed: Procedure(s) (LRB):  VENOGRAM (N/A)    Patient location: PACU    Anesthesia Type: general    Transport from OR: Transported from OR on room air with adequate spontaneous ventilation    Post pain: adequate analgesia    Post assessment: no apparent anesthetic complications and tolerated procedure well    Post vital signs: stable    Level of consciousness: awake, alert and oriented    Nausea/Vomiting: no nausea/vomiting    Complications: none    Transfer of care protocol was followed      Last vitals:   Visit Vitals  BP (!) 172/83 (BP Location: Left arm, Patient Position: Lying)   Pulse 95   Temp 36.3 °C (97.3 °F) (Oral)   Resp 18   Ht 5' 4" (1.626 m)   Wt 121.6 kg (268 lb)   SpO2 (!) 94%   Breastfeeding? No   BMI 46.00 kg/m²     "

## 2017-10-13 NOTE — PLAN OF CARE
Problem: Cardiac Rhythm Management Device (Adult)  Goal: Signs and Symptoms of Listed Potential Problems Will be Absent, Minimized or Managed (Cardiac Rhythm Management Device)  Signs and symptoms of listed potential problems will be absent, minimized or managed by discharge/transition of care (reference Cardiac Rhythm Management Device (Adult) CPG).  Outcome: Ongoing (interventions implemented as appropriate)  Admit assessment done. Labs sent. IV placed x 2. Plan of care and safety prec initiated. Will continue to monitor.

## 2017-10-13 NOTE — INTERVAL H&P NOTE
The patient has been examined and the H&P has been reviewed:    I concur with the findings and no changes have occurred since H&P was written. pt denies any acute symptoms at present. Pt states she completed contrast prep. Plans as above.       CARMELINA Cooper-BC  Cardiology Electrophysiology  NP   [facility]    Attending: MD Roberto You               Active Hospital Problems    Diagnosis  POA    Cardiomyopathy [I42.9]  Yes      Resolved Hospital Problems    Diagnosis Date Resolved POA   No resolved problems to display.

## 2017-10-13 NOTE — H&P (VIEW-ONLY)
Subjective:    Patient ID:  Amna Chawla is a 52 y.o. female who presents for follow-up of Congestive Heart Failure      HPI  I had the pleasure of seeing Amna Chawla in follow-up today for her history of cardiac arrest, heart block, and ICD implantation. As you are aware, she is a 52-year old female, former patient of Dr. Humberto Martins and patient of mine I cared for when she initially presented in cardiac arrest as well as followed in my general cardiology fellow clinic, who was admitted to [facility] in 2/2013 after having a cardiac arrest.  She was working as a school  and collapsed at work.  On EMS arrival it was described that she was pulseless and given epinephrine (? Initially PEA) however after epinephrine noted to be in VF and was resuscitated with defibrillation/ACLS.  She underwent hypothermia protocol. Her post-arrest course was notable or intermittent 3rd-degree AV block. On 2/15/2013, a dual chamber ICD was implanted. Her EF prior to discharge was 60%. She had a negative coronary CTA during that admission.  In subsequent follow-up she underwent a pharmacologic stress ECHO in 2014 which showed a preserved LVEF and no ischemia.     Subsequent ICD interrogations show no VT, 100% RV pacing.  She recently noted an episode of palpitations.  She called our device clinic.  Remote check disclosed a 50 minute episode of paroxysmal afib/flutter with RVR (3/2017).  Review of egms shows it initiated with likely a short burst of afib and then turns into a regular rhythm with TCL ~210msec (at least in the right atrium as sensed by RA lead).  We discussed this over the phone including need for anticoagulation.  After given options she elected for coumadin.  TSH was checked and is normal. She is tolerating coumadin well. She notes recent progressive dyspnea on exertion     We performed an echocardiogram on Ms. Chawla and her EF is now 40-45% in setting of chronic RV pacing. Recent PET stress showed no  ischemia/infarct. She presents for discussion on device upgrade to CRT device.     CONCLUSIONS     1 - Mildly to moderately depressed left ventricular systolic function (EF 40-45%).     2 - Impaired LV relaxation, normal LAP (grade 1 diastolic dysfunction).     3 - Normal right ventricular systolic function .     4 - The estimated PA systolic pressure is greater than 26 mmHg.     5 - Trivial to mild mitral regurgitation.     6 - Trivial to mild tricuspid regurgitation.     7 - Severe left atrial enlargement.     8 - No wall motion abnormalities.     I reviewed today's ECG tracing, which shows A-sensed V-paced rhythm at 70 bpm and a QRS duration of 184 ms.    Review of Systems   Constitution: Negative for weakness and malaise/fatigue.   HENT: Negative.    Eyes: Negative for blurred vision and visual disturbance.   Cardiovascular: Positive for dyspnea on exertion. Negative for chest pain, irregular heartbeat, near-syncope, orthopnea, palpitations, paroxysmal nocturnal dyspnea and syncope.   Respiratory: Negative for cough and shortness of breath.    Hematologic/Lymphatic: Negative for bleeding problem. Does not bruise/bleed easily.   Skin: Negative.    Musculoskeletal: Negative.    Gastrointestinal: Negative for hematochezia and melena.   Neurological: Positive for headaches. Negative for dizziness and focal weakness.        Objective:    Physical Exam   Constitutional: She is oriented to person, place, and time. She appears well-developed and well-nourished. No distress.   HENT:   Head: Normocephalic and atraumatic.   Eyes: Conjunctivae are normal. Right eye exhibits no discharge. Left eye exhibits no discharge.   Neck: Neck supple. No JVD present.   Cardiovascular: Normal rate, regular rhythm and normal heart sounds.  Exam reveals no gallop and no friction rub.    No murmur heard.  Pulmonary/Chest: Effort normal and breath sounds normal. No respiratory distress. She has no wheezes. She has no rales.   Abdominal:  Soft. Bowel sounds are normal. She exhibits no distension. There is no tenderness. There is no rebound.   Musculoskeletal: She exhibits no edema.   Neurological: She is alert and oriented to person, place, and time.   Skin: Skin is warm and dry. She is not diaphoretic.   Psychiatric: She has a normal mood and affect. Her behavior is normal. Judgment and thought content normal.   Vitals reviewed.        Assessment:       1. Paroxysmal atrial fibrillation    2. Nonischemic cardiomyopathy, likely from RV pacing    3. Essential hypertension    4. Automatic implantable cardioverter-defibrillator in situ    5. Obesity, Class III, BMI 40-49.9 (morbid obesity)         Plan:       In summary, Mrs. Chawla is a pleasant 51 yo cardiac arrest survivor with VF noted during arrest, no ischemic heart disease with preserved LVEF and now 3rd degree AV block who presents for ICD follow-up as well as having an episode of symptomatic sustained atrial fibrillation/atrial flutter (initiated irregularly then settled into a regular atrial tachycardia as seen on the RA lead with TCL ~210ms).  She notes progressive dyspnea on exertion with walking up stairs with newly discovered LVEF of 40% and normal PET stress. This is likely new onset cardiomyopathy, NYHA class II sx in setting of chronic RV pacing. Recommend upgrade to CRT-D (per BLOCK HF trial, EF <50% and heart block benefited from CRT, FDA approved indication, although HRS guidelines have not been updated and still recommend it for EF <35%). We discussed the alternatives, benefits and risks of the procedure including pain, infection, bleeding, injury to lung causing pneumothorax requiring tube placement, injury to heart valves, puncture of the heart leading to pericardial effusion or tamponade requiring tube drainage, heart attack, stroke and death. Her and her families' questions answered. She desires to proceed. Consent signed    Plan  Upgrade to CRT-D, Biotronik system  Venogram on  table prior to prep (needs contrast prep)  Would like INR ~2, ok if it is slightly under as she is predominantly in sinus rhythm  Anesthesia for sedation    Thank you for allowing me to participate in the care of this patient. Please do not hesitate to call me with any questions or concerns.     Avelino Mejia MD, PhD  Cardiac Electrophysiology

## 2017-10-14 VITALS
BODY MASS INDEX: 45.75 KG/M2 | TEMPERATURE: 98 F | OXYGEN SATURATION: 94 % | SYSTOLIC BLOOD PRESSURE: 153 MMHG | WEIGHT: 268 LBS | DIASTOLIC BLOOD PRESSURE: 92 MMHG | HEART RATE: 88 BPM | HEIGHT: 64 IN | RESPIRATION RATE: 18 BRPM

## 2017-10-14 PROCEDURE — 25000003 PHARM REV CODE 250: Performed by: NURSE PRACTITIONER

## 2017-10-14 RX ORDER — HYDROCODONE BITARTRATE AND ACETAMINOPHEN 5; 325 MG/1; MG/1
1 TABLET ORAL EVERY 6 HOURS PRN
Qty: 15 TABLET | Refills: 0 | Status: SHIPPED | OUTPATIENT
Start: 2017-10-14 | End: 2017-12-04

## 2017-10-14 RX ADMIN — PANTOPRAZOLE SODIUM 40 MG: 40 TABLET, DELAYED RELEASE ORAL at 09:10

## 2017-10-14 RX ADMIN — DONEPEZIL HYDROCHLORIDE 10 MG: 5 TABLET, FILM COATED ORAL at 09:10

## 2017-10-14 RX ADMIN — TOPIRAMATE 200 MG: 200 TABLET, FILM COATED ORAL at 09:10

## 2017-10-14 RX ADMIN — MAGNESIUM OXIDE TAB 400 MG (241.3 MG ELEMENTAL MG) 200 MG: 400 (241.3 MG) TAB at 09:10

## 2017-10-14 RX ADMIN — CHLORTHALIDONE 25 MG: 25 TABLET ORAL at 09:10

## 2017-10-14 RX ADMIN — DULOXETINE 60 MG: 60 CAPSULE, DELAYED RELEASE ORAL at 09:10

## 2017-10-14 RX ADMIN — ATORVASTATIN CALCIUM 40 MG: 20 TABLET, FILM COATED ORAL at 09:10

## 2017-10-14 RX ADMIN — VALSARTAN 80 MG: 80 TABLET ORAL at 09:10

## 2017-10-14 RX ADMIN — CARVEDILOL 25 MG: 25 TABLET, FILM COATED ORAL at 09:10

## 2017-10-14 RX ADMIN — DOXYCYCLINE HYCLATE 100 MG: 100 TABLET, COATED ORAL at 09:10

## 2017-10-14 RX ADMIN — GABAPENTIN 900 MG: 300 CAPSULE ORAL at 05:10

## 2017-10-14 NOTE — PROGRESS NOTES
Electrophysiology Progress Note  Attending Physician: Avelino Mejia MD  Hospital Day: 2    Subjective:   Interval History: patient stated that she had some pain, but felt well altogether.     Medications:   Continuous Infusions:   sodium chloride 0.9%         Scheduled Meds:   acetaminophen  1,000 mg Intravenous Q8H    aripiprazole  5 mg Oral QHS    atorvastatin  40 mg Oral QAM    carvedilol  25 mg Oral BID WM    chlorthalidone  25 mg Oral Daily    donepezil  10 mg Oral BID    doxycycline  100 mg Oral Q12H    duloxetine  60 mg Oral BID    gabapentin  900 mg Oral TID    magnesium oxide  200 mg Oral Daily    pantoprazole  40 mg Oral Daily    tiagabine  4 mg Oral QHS    topiramate  200 mg Oral BID    trazodone  200 mg Oral QHS    valsartan  80 mg Oral Daily    warfarin  10 mg Oral Daily     PRN Meds:acetaminophen, clonazePAM, fentaNYL, hydrocodone-acetaminophen 5-325mg, ondansetron, promethazine (PHENERGAN) IVPB, sodium chloride 0.9%, zolpidem  Objective:     Vitals:  Temp:  [97.4 °F (36.3 °C)-98.5 °F (36.9 °C)]   Pulse:  [59-87]   Resp:  [14-24]   BP: (109-181)/()   SpO2:  [94 %-100 %]  on RA I/O's:    Intake/Output Summary (Last 24 hours) at 10/14/17 0805  Last data filed at 10/14/17 0500   Gross per 24 hour   Intake             1810 ml   Output              500 ml   Net             1310 ml        General: well developed, well nourished  Eyes: conjunctivae/corneas clear. PERRL.  HENT: Head:normocephalic, atraumatic.   Throat: lips, mucosa, and tongue normal; teeth and gums normal and no throat erythema.  Neck: supple, symmetrical, trachea midline, no JVD and thyroid not enlarged, symmetric, no tenderness/mass/nodules  Lungs:  clear to auscultation bilaterally and normal respiratory effort  Cardiovascular: regular rate and rhythm, S1, S2 normal, no murmur, click, rub or gallop.   Extremities: no cyanosis or edema, or clubbing. Pulses: 2+ and symmetric.  Abdomen: soft, non-tender  non-distented; bowel sounds normal; no masses,  no organomegaly.  Skin: Skin color, texture, turgor normal. No rashes or lesions  Psych/Behavioral:  Alert and oriented, appropriate affect.  Chest Wall: insertion site is c/d/i. No hematoma or ecchymosis or swelling noted.    Labs:     CBC: CMP:      Recent Labs  Lab 10/10/17  0818   WBC 2.46*   HGB 12.4   HCT 36.6*      MCV 86   RDW 16.4*      Recent Labs  Lab 10/10/17  0818      K 4.1      CO2 27   BUN 13   CREATININE 0.9   CALCIUM 9.3        Cholesterol: Coagulation   Lab Results   Component Value Date    CHOL 221 (H) 03/20/2017    HDL 43 03/20/2017    LDLCALC 163.2 (H) 03/20/2017    TRIG 74 03/20/2017      Recent Labs  Lab 10/13/17  0606   INR 2.0*  2.0*        Misc:   No results for input(s): CPK, CPKMB, TROPONINI, MB in the last 168 hours.   Lab Results   Component Value Date    HGBA1C 6.0 02/02/2014        Microbiology   Microbiology Results (last 7 days)     ** No results found for the last 168 hours. **           Imaging:     CXR (10/13/2017):   A different pacemaker is present on the left with electrodes to right atrium, right ventricle, and coronary sinus. The ventricular lead is higher in position than typically seen, near the pulmonic valve. No pneumothorax post placement. Heart size appears mildly enlarged. Lung volumes are low. Allowing for   expiration, lungs are clear without acute consolidation. No pleural fluid.     EKG:   Pre-procedure EKG: A sense, V paced rhythm with QRS of 170ms.  Post-procedure EKG: A sense, Bi-V paced rhythm with QRS of 140ms      Assessment:      Ms. Chawla is a 52 year old female with a past medical history of cardiac arrest, heart block and ICD implantation, paroxysmal atrial fibrillation/flutter, CAD, HTN, Seizures and CVA with a new onset cardiomyopathy who presents for CRT_D upgrade.    Plan:     #New Onset Cardiomyopathy with 100% RV pacing   --s/p successful upgrade to CRT-D   --patient will be  discharged on doxycycline surgical prophylaxis   --work note provided for her sister   --discharge today    Staff addendum to follow    Signed:  Vandana Lyon MD  Cardiology Fellow, PGY-5  10/14/2017 8:05 AM

## 2017-10-14 NOTE — DISCHARGE INSTRUCTIONS
Instructions after Pacemaker/ICD Implantation  - Wear your sling for the first 24 hours after your discharge and avoid getting your wound wet.  - You may shower in 48 hours, but do not let beam of shower hit site directly and no scrubbing in area  - For the first 6 weeks, while your implanted leads become permanently fixed, we ask that you avoid raising your left elbow above your shoulder and lifting greater than 5-10 lbs, and strongly urge that you wear your sling at night time during that time while you sleep to avoid excessive movement.  - We will schedule a visit to our wound clinic 1-2 weeks after your procedure for close follow up, in the interim period please keep your wound as clean & dry as possible, If you notice any discharge,worsening tenderness or discomfort from the area please call our clinic ASAP.  - Please avoid lifting more then 5 lbs with the involved side.  - No driving for 1 week and for 4 weeks if patient uses Left arm to make turns  - You have received antibiotics during your hospital stay and will continue these on discharge for 5 days.   - You may use over the counter medications for pain relief, if that is not sufficient your physician may have prescribed you additional pain medication-take as instructed  - Please resume your home medication.

## 2017-10-14 NOTE — PROGRESS NOTES
Pt is AAOx3 and in no apparent distress.  R groin site and L upper chest wall site c/d/i without swelling. Provided a copy of discharge instructions.  Teaching performed and sling and swaddle in place.  Pt verbalized understanding and denied any questions.  Provided pt with prescriptions. PIV d/c catheter tip intact.  2x2 applied and no active bleeding noted.  Pt waiting for wheelchair to escort to garage.  Family at the bedside.

## 2017-10-14 NOTE — DISCHARGE SUMMARY
Discharge Summary  Electrophysiology      Admit Date: 10/13/2017    Discharge Date and Time: 10/14/2017 8:05 AM    Discharge Attending Physician: Avelino Mejia MD     Diagnoses:  Active Hospital Problems    Diagnosis  POA    *Cardiomyopathy [I42.9]  Yes    Nonischemic cardiomyopathy from RV pacing [I42.8]  Yes    Complete AV block [I44.2]  Yes      Resolved Hospital Problems    Diagnosis Date Resolved POA   No resolved problems to display.       Discharged Condition: stable    Hospital Course:   Ms. Chawla is a 52 year old female with a past medical history of cardiac arrest, heart block and ICD implantation, paroxysmal atrial fibrillation/flutter, CAD, HTN, Seizures and CVA with a new onset cardiomyopathy who presents for CRT_D upgrade. She underwent successful CRT-D upgrade without any immediate complications. She was prescribed doxycycline as surgical prophylaxis. She was discharged without incident and will follow with pacemaker clinic in 2 weeks for wound check and with Dr. Mejia in 3 months.    Consults: None    Significant Diagnostic Studies:  CBC: CMP:      Recent Labs  Lab 10/10/17  0818   WBC 2.46*   HGB 12.4   HCT 36.6*      MCV 86   RDW 16.4*      Recent Labs  Lab 10/10/17  0818      K 4.1      CO2 27   BUN 13   CREATININE 0.9   CALCIUM 9.3        Disposition: Home or Self Care    Patient Instructions:      Medication List      START taking these medications    doxycycline 100 MG tablet  Commonly known as:  VIBRA-TABS  Take 1 tablet (100 mg total) by mouth every 12 (twelve) hours.     valsartan 80 MG tablet  Commonly known as:  DIOVAN  Take 1 tablet (80 mg total) by mouth once daily.        CONTINUE taking these medications    aripiprazole 5 MG Tab  Commonly known as:  ABILIFY  Take 1 tablet (5 mg total) by mouth every evening.     atorvastatin 40 MG tablet  Commonly known as:  LIPITOR  Take 1 tablet (40 mg total) by mouth every morning.     carvedilol 25 MG tablet  Commonly  known as:  COREG  TAKE 1 TABLET(25 MG) BY MOUTH TWICE DAILY WITH MEALS     chlorthalidone 25 MG Tab  Commonly known as:  HYGROTEN  Take 1 tablet (25 mg total) by mouth once daily.     clonazePAM 1 MG tablet  Commonly known as:  KLONOPIN  Take 1 tablet (1 mg total) by mouth daily as needed for Anxiety.     donepezil 10 MG tablet  Commonly known as:  ARICEPT  Take 1 tablet (10 mg total) by mouth 2 (two) times daily.     duloxetine 60 MG capsule  Commonly known as:  CYMBALTA  Take 1 capsule (60 mg total) by mouth 2 (two) times daily.     gabapentin 300 MG capsule  Commonly known as:  NEURONTIN  Take 3 capsules (900 mg total) by mouth 3 (three) times daily.     lorazepam 0.5 MG tablet  Commonly known as:  ATIVAN  Take 1 tablet (0.5 mg total) by mouth every 6 (six) hours as needed for Anxiety.     magnesium 200 mg Tab  Take 200 mg by mouth once daily.     ondansetron 4 MG Tbdl  Commonly known as:  ZOFRAN-ODT  Take 1 tablet (4 mg total) by mouth every 24 hours as needed (nausea).     pantoprazole 40 MG tablet  Commonly known as:  PROTONIX  Take 1 tablet (40 mg total) by mouth once daily.     tiagabine 4 MG tablet  Commonly known as:  GABITRIL     tizanidine 4 MG tablet  Commonly known as:  ZANAFLEX     topiramate 100 MG tablet  Commonly known as:  TOPAMAX  Take 2 tablets (200 mg total) by mouth 2 (two) times daily.     trazodone 100 MG tablet  Commonly known as:  DESYREL  Take 2 tablets (200 mg total) by mouth every evening.     VITAMIN D2 50,000 unit Cap  Generic drug:  ergocalciferol  TAKE 1 CAPSULE BY MOUTH EVERY 7 DAYS     warfarin 5 MG tablet  Commonly known as:  COUMADIN  TAKE 1 1/2 TABLET BY MOUTH ON TUESDAYS, THURSDAYS AND SATURDAYS. TAKE 2 TABLETS BY MOUTH ON ALL OTHER DAYS     zolpidem 10 mg Tab  Commonly known as:  AMBIEN  Take 1 tablet (10 mg total) by mouth nightly as needed.        ASK your doctor about these medications    cimetidine 300 MG tablet  Commonly known as:  TAGAMET  Take 1 tab at 6 pm & 11 pm on  night before your proc, then 1 tab at 6 am on day of proc     diphenhydrAMINE 50 MG capsule  Commonly known as:  BENADRYL  Take 1 tab at 6 pm & 11 pm on night before your proc, then 1 tab at 6 am on day of proc     predniSONE 50 MG Tab  Commonly known as:  DELTASONE  Take 1 tab at 6 pm & 11 pm on night before your proc, then 1 tab at 6 am on day of proc           Where to Get Your Medications      These medications were sent to [id number] - [place], [place] - [address] AT [facility] & [address]  [address], [place] [address]    Hours:  24-hours Phone:  [phone]   · doxycycline 100 MG tablet  · valsartan 80 MG tablet           Discharge Procedure Orders  Other restrictions (specify):   Scheduling Instructions: Instructions to patient:  - We have implanted a device upgrade  on this admission, please take the following precautions in the days/weeks following your procedure  - Please refer to the written post procedure hand out that was given to you.   - For the first 6 weeks, while your implanted leads become permanently fixed, we ask that you avoid raising your left elbow above your shoulder,  and lifting greater than 5-10 lbs.   - We will schedule a visit to our wound clinic 2 week after your procedure for close follow up, in the interim period please keep your wound as clean & dry as possible, If you notice any discharge,worsening tenderness or discomfort from the area please call our clinic as soon as possible>  Telephone [phone] > or main hospital number  to be directed to Device Clinic [phone].   - You may continue the antibiotics as  prescribed    - Follow with Dr Avelino Mejia  in 3  months post procedure     Call MD for:  temperature >100.4     Call MD for:  persistent nausea and vomiting     Call MD for:  severe uncontrolled pain     Call MD for:  difficulty breathing, headache or visual disturbances     Call MD for:  redness, tenderness,  or signs of infection (pain, swelling, redness, odor or green/yellow discharge around incision site)     Call MD for:  hives     Call MD for:  persistent dizziness or light-headedness         Future Appointments  Date Time Provider Department Center   10/19/2017 9:00 AM David Cortez MD [facility] MARGAUX EPI [facility]   10/19/2017 9:00 AM Belen Serrato PharmD [facility] COUMAD [facility]   11/6/2017 9:00 AM La Monroe, PhD [facility] SOCL WK [facility]   1/3/2018 9:00 AM Kade Huffman III, IDA [facility] PSYCH [facility]   1/4/2018 8:00 AM HOME MONITOR DEVICE CHECK, [facility] ARRHYTH [facility]

## 2017-10-16 NOTE — ANESTHESIA POSTPROCEDURE EVALUATION
"Anesthesia Post Evaluation    Patient: Amna Chawla    Procedure(s) Performed: Procedure(s) (LRB):  VENOGRAM (N/A)    Final Anesthesia Type: general  Patient location during evaluation: PACU  Patient participation: Yes- Able to Participate  Level of consciousness: awake and alert  Post-procedure vital signs: reviewed and stable  Pain management: adequate  Airway patency: patent  PONV status at discharge: No PONV  Anesthetic complications: no      Cardiovascular status: blood pressure returned to baseline and hemodynamically stable  Respiratory status: unassisted, spontaneous ventilation and room air  Hydration status: euvolemic  Follow-up not needed.        Visit Vitals  BP (!) 153/92 (BP Location: Right arm, Patient Position: Lying)   Pulse 88   Temp 36.6 °C (97.9 °F) (Oral)   Resp 18   Ht 5' 4" (1.626 m)   Wt 121.6 kg (268 lb)   SpO2 (!) 94%   Breastfeeding? No   BMI 46.00 kg/m²       Pain/Kofi Score: No Data Recorded      "

## 2017-10-19 ENCOUNTER — DOCUMENTATION ONLY (OUTPATIENT)
Dept: ELECTROPHYSIOLOGY | Facility: CLINIC | Age: 52
End: 2017-10-19

## 2017-10-19 ENCOUNTER — PROCEDURE VISIT (OUTPATIENT)
Dept: NEUROLOGY | Facility: CLINIC | Age: 52
End: 2017-10-19
Payer: MEDICARE

## 2017-10-19 ENCOUNTER — ANTI-COAG VISIT (OUTPATIENT)
Dept: CARDIOLOGY | Facility: CLINIC | Age: 52
End: 2017-10-19
Payer: MEDICARE

## 2017-10-19 VITALS
SYSTOLIC BLOOD PRESSURE: 104 MMHG | HEIGHT: 64 IN | HEART RATE: 71 BPM | WEIGHT: 260.81 LBS | BODY MASS INDEX: 44.53 KG/M2 | DIASTOLIC BLOOD PRESSURE: 63 MMHG

## 2017-10-19 DIAGNOSIS — M62.838 NECK MUSCLE SPASM: ICD-10-CM

## 2017-10-19 DIAGNOSIS — G43.711 CHRONIC MIGRAINE WITHOUT AURA, WITH INTRACTABLE MIGRAINE, SO STATED, WITH STATUS MIGRAINOSUS: Primary | ICD-10-CM

## 2017-10-19 DIAGNOSIS — I48.0 PAROXYSMAL ATRIAL FIBRILLATION: ICD-10-CM

## 2017-10-19 DIAGNOSIS — Z79.01 LONG-TERM (CURRENT) USE OF ANTICOAGULANTS: Primary | ICD-10-CM

## 2017-10-19 LAB — INR PPP: 1.4 (ref 2–3)

## 2017-10-19 PROCEDURE — 64615 CHEMODENERV MUSC MIGRAINE: CPT | Mod: PBBFAC | Performed by: PSYCHIATRY & NEUROLOGY

## 2017-10-19 PROCEDURE — 99211 OFF/OP EST MAY X REQ PHY/QHP: CPT | Mod: PBBFAC

## 2017-10-19 PROCEDURE — 85610 PROTHROMBIN TIME: CPT | Mod: PBBFAC

## 2017-10-19 PROCEDURE — 99999 PR PBB SHADOW E&M-EST. PATIENT-LVL I: CPT | Mod: PBBFAC,,,

## 2017-10-19 PROCEDURE — 64615 CHEMODENERV MUSC MIGRAINE: CPT | Mod: S$PBB,,, | Performed by: PSYCHIATRY & NEUROLOGY

## 2017-10-19 RX ADMIN — ONABOTULINUMTOXINA 200 UNITS: 100 INJECTION, POWDER, LYOPHILIZED, FOR SOLUTION INTRADERMAL; INTRAMUSCULAR at 10:10

## 2017-10-19 NOTE — PROCEDURES
Follow up:   Flare up of TMJ and HA during daughter's wedding   NSAIDS helped   2 weeks ago BOTOX effect wore off     Prior note:   2 weeks ago BOTOX effect wore off   Has severe spasm and pain in the traps catalina   Weather change has provoked severe migraine + nausea   She started coumadin       Prior note:   3 weeks ago BOTOX effect wore off   She reports severe daily HA    During BOTOX periods she feels she  her HA. Much less intense      Prior note:   The patient is a 51-year-old female who [facility]. Since her last visit, she reports growing frustration   about the fact that nothing has helped her with regards to her headaches. She   continues to experience daily headaches. She takes mefenamic acid and   tizanidine every night, which only provides her two hours of relief. She is   unable to sleep because of the refractory headaches. She is incapacitated   because of severe headaches. She reports minimal relief with infusions that she  gets on a periodic basis. She does report an improvement overall with the   Botox. However, this effect has worn off in the last two weeks. She also   reports an episode wherein she fell with loss of consciousness, which was not   provoked. As a result, she injured her ankle and is currently wearing a boot.      Prior note:   since last week - Reports vertigo for 6 - 7 minutes upto 3 times daily   Nearly daily severe HA - no response to ponstel , compazine   She is late for her BOTOX - last 2 weeks were painful       Follow up:   R sided Headache is constant max at TMJ on R   Prior note:   She reports new episodes of R face tingling. Sh also reports 2 episodes of blurred vision lasting 15 minutes. These hapended while watching TV. Her pain remains unchanged   Follow up:   2 days of severe HA during Thanksgiving   Pounding bifrontal region   Pain worse over L eye brow   Follow up:   Reports bifrontal severe HA (worse on L)  "with no nausea with sudden onset . Occurs daily   NO note:  I found no papilledema or other changes to suggest elevated intracranial pressure or other alternative etiology for her headaches. Repeat exam in two years.  HA are less frequent and less intense.   (R levator scapula spasm)   symptoms better with lateral flexion to L   Prior note:   She still has daily HA with severe sharp stabbing pain behind L eye lasting for 15 sec   Prior note:   Daily pain. 2/week - nausea.  Shu Lares RN at 9/17/2014 4:53 PM  Pt presented to clinic for medical advice. Pt is seen by Dr. Paredes and Dr. Cortez.  1) She went ER on 9/13/14 for a migraine. She was given Reglan, Benadryl, MSO4 and IVF. A couple days later she developed an all over body "tremor". She states "I think I have Parkinson's". - Per Dr. Paredes, medication related tremor (Reglan & Benadryl). Informed her it should wear off in a few days. If not, she is to call and let us know.  2) Headaches - triggered by insomnia.   Current meds:  Ketoprofen - she took it once and said her "throat closed up" and she's not supposed to have anything with aspirin in it.  Nortriptyline - she was taking it at night, but it didn't help her sleep, so she stopped it.  Per Dr. Cortez, discontinue Ketoprofen and Nortriptyline. Start Effexor XR 37.5mg QD, tizanidine 4mg BID PRN headache and Klonopin 0.25 - 0.5mg QHS PRN. Will schedule pt for sphenocath procedure within the next week.   Prior note:   48 yo woman with history of HTN and asthma, both reportedly controlled, in hospital early 2013 after "passed out" and became unresponsive. CPR in the field for 8 minutes until EMS arrived. Per ED note, on arrival she was found to be in PEA, given epinephrine. Vfib/Vtach was achieved which was shocked x1. Patient converted to sinus tachycardia after shock. I do not know the time course for the above treatment. On arrival to facility patient was in sinus tachycardia with strong pulses, " "intubated and unresponsive. ET tube placement was confirmed with direct laryngoscopy and good bilateral breath sounds were heard after the tube was pulled back 2 cm. Reported to have "withdrawal" movements in ER during stabilization, then sedated and cooling protocol initiated. Had remarkable   recovery and first seen in clinic in 2013.   Headache history: cluster   Age of onset -    Location - behind L eye   Nature of pain - sharp   Prodrome - no   Aura - No   Duration of headache - 6-7 min   Time to peak intensity -   Pain scale - range of intensity - 9/10   Frequency - daily   Status Migrainosus history - 1-3 days   Time of day of most headaches- anytime   Associated symptoms with the headache:   Red eyes   swelling of L face   Headache history: Migraine   Age of onset -    Location - bifrontal   Nature of pain - Pounding   Prodrome - no   Aura - No   Duration of headache - > 4 hrs   Time to peak intensity -   Pain scale - range of intensity - 9/10   Frequency - 5/week   Status Migrainosus history - 1-3 days   Time of day of most headaches- anytime   Associated symptoms with the headache:   Meningeal symptoms - photophobia, phonophobia, exercise intolerance +   Nausea/vomitting +   Nasal drainage   Visual blurriness +   Pallor/flushing   Dizziness   Vertigo   Confusion   Impaired concentration +   Pain worsened with physical activity +   Neck pain +   Symptoms of increased intracranial pressure:   Whooshing sounds - no   Visual spots/blotches - no   Headache Triggers: unknown   Other comorbid conditions:   Anxiety - no   Motion sickness symptom - no       Treatment history:   Resolution of headache with sleep - yes       Meds tried:   Trigger points involvin/9/15 helped for 1 day   Site: Right   Levator scapula   Pharmacological agent:   0.5% Sensorcaine solution   No complications were noted.  Supraorbital block - helped for 2 days   Tylenol - not help   imitrex - chest tightness   alleve - " "help   topamax - not helping   Neurontin - not helping   Paxil, Elavil - not help   seroquel - nightmare   Ketoprofen - she took it once and said her "throat closed up" and she's not supposed to have anything with aspirin in it.  Nortriptyline - she was taking it at night, but it didn't help her sleep, so she stopped it.  reglan - parkinsonism   zanaflex - help   Toradol 30 mg IM inj at home - too expensive   BOTOX round #1 9/3/15 - helped (R levator scapula spasm)   Round #2 12/3/15 - helped   Round#3 3/316 - helped   Round #4 6/1/16 - helped   BOTOX#5 9/15/16 - helped     BOTOX#6 - 11/30/16 - helped   BOTOX#7 - 3/9/17 - helped    BOTOX#8 - 5/25/17 - helped    BOTOX#9 8/10/17 - helped     Can not do RFA - pt has pacemaker   Procedure: IOVERA peripheral nerve focus cold therapy (non ablative cryotherapy) 12/30/15 - not help   Indication: Cryotherapy of select peripheral nerves of the head was performed to treat chronic headaches that failed to respond to several preventive pharmacological therapies.   Sedation: None   Informed consent: The procedure, risks, benefits and options were discussed with the patient. There are no contraindications to the procedure. The patient expressed understanding and agreed to the procedure. I verify that I personally obtained the patient's consent prior to the start of the procedure.  Location:  Bilateral Supra orbital nerve (3 cycles on R and 1 cycle on L)   Bilateral Occipital nerve   3 cycles   Pain relief: Pain score reduced 10/10->0/10   9/26 Bilateral Sphenopalatine Ganglion and bilateral Maxillary Branch (Trigeminal Nerve) Block - no help   ONB - not help                                            Shannon Pagan RN at 12/31/2014 3:54 PM       Status: Signed                   Expand All Collapse All   HEADACHE FASTTRACK  PAIN 7/10 NAUSEA 0/10  ROUND 1: TORADOL, IMITREX, MORPHINE, BENADRYL, AND MAGNESIUM  PAIN 7/10 NAUSEA 0/10  PT STATED SHE WAS READY TO GO HOME AND GO TO SLEEP. " PT D/C'ED IN NAD            Shannon Pagan RN at 10/5/2015 12:49 PM       Status: Signed                   Expand All Collapse All   HEADACHE FASTTRACK  PAIN 8/10 NAUSEA 10/10  FIRST ROUND: tORADOL, BENADRYL, COMPAZINE, IMITREX, MAGNESIUM, AND VALPROATE.  PAIN 1/10 NAUSEA 0/10  PT READY TO GO HOME AND FEELING BETTER. PT LEFT IN NAD WITH FAMILY MEMBER  TOTAL TIME: 9897-1819       Shannon Pagan RN at 10/20/2015 3:05 PM       Status: Signed                   Expand All Collapse All   HEADACHE FASTTRACK  PAIN 10/10 NAUSEA 0/10  FIRST ROUND: tORADOL, BENADRYL, COMPAZINE, IMITREX, MAGNESIUM, AND VALPROATE.  BP BEING MONITORED EVERY 15 MIN AND REMAINED 170'S/80-90'S. DR CHOPRA NOTIFIED AND STATED TO MAKE SURE BP DID NOT EXCEED 180 SYSTOLIC OR PT SHOULD REPORT TO ED. BP AT 1500 183/92. DR CHOPRA NOTIFIED AND GAVE ORDER TO STOP INFUSION AND SEND PATIENT TO ED. PT BROUGHT TO ED BY INFUSION NURSE VIA WHEELCHAIR.   PAIN 8/10 NAUSEA 0/10  TOTAL TIME: 9741-8492               Progress Notes                           Shannon Pagan RN at 2/24/2016 1:36 PM       Status: Signed                  Expand All Collapse All   HEADACHE FASTTRACK  PAIN 10/10 NAUSEA 7/10  FIRST ROUND: tORADOL, BENADRYL, AND MORPHINE-BP RANGING FROM 177-203/84-92, HR 60-82  MD NOTIFIED AND GAVE ORDERS TO SEND TO ED. PT LEFT VIA W/C WITH INFUSION NURSE ON WAY TO ED.            Headache risk factors:   H/o TBI - CHI (2013) + neck sprain   H/o Meningitis - no   F/h Aneurysms - no       Review of Systems   Constitutional: Negative.   HENT: Negative.   Eyes: Negative.   Respiratory: Negative.   Cardiovascular: Negative.   Gastrointestinal: Negative.   Endocrine: Negative.   Genitourinary: Negative.   Musculoskeletal: Negative.   Skin: Negative.   Allergic/Immunologic: Negative.   Hematological: Negative.   Psychiatric/Behavioral: insomnia      Objective:     Physical Exam   Constitutional: She is oriented to person, place, and time. She appears well-developed.    obesity   HENT:   Head: Normocephalic and atraumatic.   Right Ear: External ear normal.   Left Ear: External ear normal.   Nose: Nose normal.   Mouth/Throat: Oropharynx is clear and moist.   Eyes: Conjunctivae and EOM are normal. Pupils are equal, round, and reactive to light.   Neck: Normal range of motion.   Cardiovascular: Regular rhythm.   Pulmonary/Chest: Effort normal and breath sounds normal.   Musculoskeletal: Normal range of motion.   Neurological: She is alert and oriented to person, place, and time. She has normal reflexes. She displays normal reflexes. No cranial nerve deficit. She exhibits normal muscle tone. Coordination normal. Uses cane.   Ataxic gait - wide based   Skin: Skin is warm and dry.   Psychiatric: She has a normal mood and flat affect. Her behavior is normal. Judgment and thought content normal.   Headache Exam:  Optic disc is flat OU   Temples appear symmetric  No V1,V2,V3 distribution allodynia/hyperalgesia catalina   No facial swelling  No gross TMJ abnormalities   No ptosis   No conj injection   No meningismus   No neck stiffness  No C spine tenderness  Cervical facet tenderness on palpation catalina   No tender points over trapezium   catalina supraorbital nerve exit point tenderness  catalina occipital nerve exit point tenderness  No auriculotemporal nerve tenderness   Tenderness of levator scapula catalina           Assessment:     1.  Occipital neuralgia     2.  Cervicalgia     3.  4  5  6 Chronic migraine without aura, with intractable migraine, so stated, with status migrainosus (post traumatic) post concussive?  Depression   TMJ - R sided   Insomnia       Head CT  Findings: There is no evidence of acute or recent major vascular territory cerebral infarction, parenchymal hemorrhage, or intra-axial mass.  There are no extra-axial masses or abnormal fluid collections.  The ventricular system is within normal limits of size for age and shows no distortion by mass-effect or evidence of hydrocephalus.   There is no fracture or destructive osseous lesion.  The extracranial soft tissues are unremarkable.  The visualized paranasal sinuses and mastoid air cells are clear.   Impression    No acute intracranial abnormality.  ______________________________________     Electronically signed by resident: KRISSY MAYERS MD  Date: 03/14/16  Time: 17:09            Results for PUSHPA KEY (MRN [id number]) as of 9/3/2015 14:26     Ref. Range  7/20/2015 11:06  8/19/2015 14:15    Vitamin B-12  Latest Range: 210-950 pg/mL  383       Retinol, serum  Latest Range:  ug/dL     52    Vitamin B2   Latest Range: 1-19 mcg/L      2     Vitamin B6   Latest Range: 5-50 ug/L      4 (L)     Vit D, 25-Hydroxy   Latest Range: 30-96 ng/mL   13 (L)           Plan:     1. Prophylactic medication -   Despiramine 25 mg AM (for dizziness)   Cymbalta 120 mg daily   Aricept 20 mg daily   Neurontin 900 mg TID   Magnesium 200 mg daily  Zanaflex 8 mg PRN   Topamax 200 mg BID   Cont B2, B6 supplements   2, Breakthrough headache - zanaflex 8 mg, Gabitril 8 mg  PRN percocet Q=30  Multiple alternative treatment options were offered to the patient   3. Refrain from over counter pain medications. Discussed medication overuse headache.cont Magnesium 400 mg PO BID   4. Occipital neuralgia - If medical management is ineffective may consider occipital nerve blocks.   5. I again urged the patient to keep a headache calender.    f/up Dr. Rock for TBI    B12 injection - 5/25/17     BOTOX was performed as an indicated therapy for intractable chronic migraine headaches given that the patient failed > 5 prophylactic medications  Botulinum Toxin Injection Procedure   Pre-operative diagnosis: Chronic migraine   Post-operative diagnosis: Same   Procedure: Chemical neurolysis   After risks and benefits were explained including bleeding, infection, worsening of pain, damage to the areas being injected, weakness of muscles, loss of muscle control, dysphagia if  injecting the head or neck, facial droop if injecting the facial area, painful injection, allergic or other reaction to the medications being injected, and the failure of the procedure to help the problem, a signed consent was obtained.   The patient was placed in a comfortable area and the sites to be treated were identified.The area to be treated was prepped three times with alcohol and the alcohol allowed to dry. Next, a 30 gauge needle was used to inject the medication in the area to be treated.   Area(s) injected:   Total Botox used: 175 Units   Botox wastage: 25 Units   Injection sites:    muscle bilaterally ( a total of 10 units divided into 2 sites)   Procerus muscle (5 units)   Frontalis muscle bilaterally (a total of 20 units divided into 4 sites)   Temporalis muscle bilaterally (a total of 40 units divided into 8 sites)   Occipitalis muscle bilaterally (a total of 30 units divided into 6 sites)   Cervical paraspinal muscles (a total of 20 units divided into 4 sites)   Trapezius muscle bilaterally (a total of 30 units divided into 6 sites)  Masseter 5 units x 2   Extensors 5 units x 2   Complications: none       RTC for the next Botox injection: 10 weeks

## 2017-10-19 NOTE — PROGRESS NOTES
I have reviewed the nurse's initial findings and agree with their assessment.  I have personally spoken with and assessed the patient in clinic to devise care plan.

## 2017-10-19 NOTE — PROGRESS NOTES
INR low today. CRT-D upgrade procedure on 10/13. Patient missed dose of coumadin 10/14. Patient on doxy 10/14-10/19. Patient states that her appetite has been decreased since 10/14 but is getting better. Patient denies any pain, bruising or bleeding. Patient will continue greens once a week. Patient previously stable on coumadin dose. Will boost dose today and tomorrow and then re challenge previous weekly dose. Will monitor patient closely with follow up in 1 week. Patient advised to call with any changes or concerns.

## 2017-10-19 NOTE — PROGRESS NOTES
Pt walked up to the  today requesting a new cover dressing for her AICD incision.  Pt was escorted to an exam room to check the site.  The aquacel dressing was loosely attached at the left side and beginning to allow water intrusion.  The aquacel dressing was completely removed.  The incision is intact, dermabond in place.  There was no separation of the incision, no drainage or redness noted.  I requested the patient leave the site uncovered until her wound appt next week.  She will notify the clinic for any redness, drainage or fever.

## 2017-10-23 ENCOUNTER — PATIENT MESSAGE (OUTPATIENT)
Dept: NEUROLOGY | Facility: CLINIC | Age: 52
End: 2017-10-23

## 2017-10-24 ENCOUNTER — OUTPATIENT CASE MANAGEMENT (OUTPATIENT)
Dept: ADMINISTRATIVE | Facility: OTHER | Age: 52
End: 2017-10-24

## 2017-10-24 NOTE — PROGRESS NOTES
Patient/case has been transferred to Mone Hawthorne RN in [facility]    Patient was previously followed by Vianey Barnard RN and Rand Kaur LCSW. Patient was closed after several unsuccessful attempts. Patient called [facility] on 10/10 inquiring about a shower chair and a home health nurse to bathe her after her pacemaker replacement on 10/13. [facility] [place] message to Adriana Sloan Rn. RN reached on 10/12 explaining to the patient that insurance does not cover shower chairs, RN sent message to Dr. Avelino Mejia in cardiology requesting home health for patient. Patient called again today stating that no one has called her regarding home health nurse.     Please contact [facility]patient [facility] Care Management at extension [phone] with any questions.    Thank you,  Bethany Auguste

## 2017-10-24 NOTE — PROGRESS NOTES
Patient is a 51 y/o female with dx of cognitive deficits, new Pacemaker implanted , hx of CVA (2013) and impaired functional mobility.  Patient stated that currently she lives with her two daughters in an apartment.  Patient stated that she doesn't drive but one of her daughters will bring her to all of her medical appointments.  PHQ-2 performed and score of 0.  Medication reconciliation performed and no discrepancies noted.  Patient stated that within the past year she has fallen once without injury.  Patient stated that she does use a cane to assist with ambulation.  Patient stated that currently she receives from disability 780.00/month.  Patient stated that she could use assistance with bathing since her ICD implantation on 10/13/17. I will mail educational literature about risk of infection to patient/family, will review literature with patient/family at next phone call.  Pt reports it is hard for her to bathe herself and needs assistance. Pt reports she spoke to Adriana Sloan RN on 10/12/17 and RN sent notification to Dr. Mejia. Pt reports she has not received a call HH about an aide. This nurse will in basket Dr. Mejia in regards to orders for HH- Aide. Pt reports she has not other needs at this time.     -Collaborate with PCP- HH- Aide  Continue to educate about risk of infection. Review signs and symptoms of infection. Encourage patient to follow medication and treatment regimen. Encourage patient to maintain follow up with doctors.

## 2017-10-24 NOTE — LETTER
October 25, 2017    Amna Chawla  [address]  [place] [address]             [facility]  [address]  [place] [address] Dear Ms. Chawla :    I have chose some educational and resource material that I believe you may find useful. Please take your time to review them. Do not hesitate to call me for any questions or concerns. Remember, you may also call [facility] 24/7 Nurse Triage line at [phone] for assistance anytime.     My contact list:    General Appointment Scheduling [phone]  [facility] 24/7 (nurse line) [phone]    Sincerely,     Mone Hawthorne, RN  Outpatient Case Management  [facility]  [phone]

## 2017-10-25 ENCOUNTER — OUTPATIENT CASE MANAGEMENT (OUTPATIENT)
Dept: ADMINISTRATIVE | Facility: OTHER | Age: 52
End: 2017-10-25

## 2017-10-25 NOTE — PROGRESS NOTES
Chart reviewed. Contacted CHRISTINE Moralez RN with Dr. Mejia office who advised I contact pt's PCP for Home Health-Aide. This nurse contacted Diego at Dr. Child's office in regards to HH-Aide to assist pt with bathing. Diego reports she will speak with PCP and call me back. This nurse called pt and updated her. Pt reports she will speak with her older daughter about assisting her. Will follow up. JERRI Hawthorne RN-[facility]     -Collaborate with PCP- HH- Aide  Continue to educate about risk of infection. Review signs and symptoms of infection. Encourage patient to follow medication and treatment regimen. Encourage patient to maintain follow up with doctors.

## 2017-10-27 ENCOUNTER — CLINICAL SUPPORT (OUTPATIENT)
Dept: ELECTROPHYSIOLOGY | Facility: CLINIC | Age: 52
End: 2017-10-27
Payer: MEDICARE

## 2017-10-27 ENCOUNTER — ANTI-COAG VISIT (OUTPATIENT)
Dept: CARDIOLOGY | Facility: CLINIC | Age: 52
End: 2017-10-27
Payer: MEDICARE

## 2017-10-27 DIAGNOSIS — Z79.01 LONG-TERM (CURRENT) USE OF ANTICOAGULANTS: Primary | ICD-10-CM

## 2017-10-27 DIAGNOSIS — Z95.810 IMPLANTABLE CARDIOVERTER-DEFIBRILLATOR (ICD) IN SITU: ICD-10-CM

## 2017-10-27 DIAGNOSIS — R00.2 PALPITATIONS: ICD-10-CM

## 2017-10-27 DIAGNOSIS — I45.81 PROLONGED QT SYNDROME: ICD-10-CM

## 2017-10-27 DIAGNOSIS — I48.0 PAROXYSMAL ATRIAL FIBRILLATION: ICD-10-CM

## 2017-10-27 LAB — INR PPP: 1.6 (ref 2–3)

## 2017-10-27 PROCEDURE — 99211 OFF/OP EST MAY X REQ PHY/QHP: CPT | Mod: PBBFAC

## 2017-10-27 PROCEDURE — 99999 PR PBB SHADOW E&M-EST. PATIENT-LVL I: CPT | Mod: PBBFAC,,,

## 2017-10-27 PROCEDURE — 85610 PROTHROMBIN TIME: CPT | Mod: PBBFAC

## 2017-10-27 PROCEDURE — 93284 PRGRMG EVAL IMPLANTABLE DFB: CPT | Mod: PBBFAC | Performed by: INTERNAL MEDICINE

## 2017-10-27 NOTE — PROGRESS NOTES
Pt initially seen by Katya MARTELL. I have reviewed her initial findings and agree with her assessment.  I have personally spoken with and assessed the patient in clinic to devise care plan.

## 2017-11-03 ENCOUNTER — ANTI-COAG VISIT (OUTPATIENT)
Dept: CARDIOLOGY | Facility: CLINIC | Age: 52
End: 2017-11-03
Payer: MEDICARE

## 2017-11-03 DIAGNOSIS — Z79.01 LONG-TERM (CURRENT) USE OF ANTICOAGULANTS: Primary | ICD-10-CM

## 2017-11-03 DIAGNOSIS — I48.0 PAROXYSMAL ATRIAL FIBRILLATION: ICD-10-CM

## 2017-11-03 LAB — INR PPP: 2.3 (ref 2–3)

## 2017-11-03 PROCEDURE — 85610 PROTHROMBIN TIME: CPT | Mod: PBBFAC | Performed by: PHARMACIST

## 2017-11-03 PROCEDURE — 99999 PR PBB SHADOW E&M-EST. PATIENT-LVL I: CPT | Mod: PBBFAC,,, | Performed by: PHARMACIST

## 2017-11-03 PROCEDURE — 99211 OFF/OP EST MAY X REQ PHY/QHP: CPT | Mod: PBBFAC | Performed by: PHARMACIST

## 2017-11-06 ENCOUNTER — DOCUMENTATION ONLY (OUTPATIENT)
Dept: PSYCHIATRY | Facility: CLINIC | Age: 52
End: 2017-11-06

## 2017-11-07 ENCOUNTER — CLINICAL SUPPORT (OUTPATIENT)
Dept: ELECTROPHYSIOLOGY | Facility: CLINIC | Age: 52
End: 2017-11-07
Attending: INTERNAL MEDICINE
Payer: MEDICARE

## 2017-11-07 ENCOUNTER — OUTPATIENT CASE MANAGEMENT (OUTPATIENT)
Dept: ADMINISTRATIVE | Facility: OTHER | Age: 52
End: 2017-11-07

## 2017-11-07 ENCOUNTER — TELEPHONE (OUTPATIENT)
Dept: CARDIOLOGY | Facility: CLINIC | Age: 52
End: 2017-11-07

## 2017-11-07 DIAGNOSIS — R00.2 PALPITATIONS: ICD-10-CM

## 2017-11-07 DIAGNOSIS — I45.81 PROLONGED QT SYNDROME: ICD-10-CM

## 2017-11-07 DIAGNOSIS — Z95.810 IMPLANTABLE CARDIOVERTER-DEFIBRILLATOR (ICD) IN SITU: ICD-10-CM

## 2017-11-07 PROCEDURE — 93284 PRGRMG EVAL IMPLANTABLE DFB: CPT | Mod: PBBFAC | Performed by: INTERNAL MEDICINE

## 2017-11-07 NOTE — PROGRESS NOTES
Spoke to pt who reports she is on the way to an appointment. Pt asked this nurse to call her back another day. Will follow up. JERRI Hawthorne RN-[facility]     -Collaborate with PCP- HH- Aide  Continue to educate about risk of infection. Review signs and symptoms of infection. Encourage patient to follow medication and treatment regimen. Encourage patient to maintain follow up with doctors.

## 2017-11-07 NOTE — TELEPHONE ENCOUNTER
The pt said that she saw the Pacemaker Dept for defib tune up and they took her b/p and it was 187/93.She said that they told her to check her b/p at home and that she should make an appt to see general Cardiology.Pt said that she purchased a new b/p machine and she needed parameters,gave her parameters <90/50 or >140/90.She did agree to schedule an appt with Hansel with  also being there on 11/14/17.Asked her to start checking b/p daily and record,she will bring her readings to her cardiology appt next Tuesday.She reports she is taking her medication as Rx'd,denies eating a lot of salt.Pt said that she has never been this high in the past.Instructed pt to call the office back if they would have any further questions or concerns.Pt reports understanding of these instructions.

## 2017-11-07 NOTE — TELEPHONE ENCOUNTER
----- Message from Alysia Garces sent at 11/7/2017  2:52 PM CST -----  Contact: pt   Pt called because she said she needs someone to help her set up her blood pressure machine.  She can be reached @ [phone].  She is a pt of Dr. Toney/Bahman and LOV 6/1/17.  Thanks!!

## 2017-11-09 ENCOUNTER — TELEPHONE (OUTPATIENT)
Dept: ELECTROPHYSIOLOGY | Facility: CLINIC | Age: 52
End: 2017-11-09

## 2017-11-09 NOTE — TELEPHONE ENCOUNTER
----- Message from Pacheco Jorge MA sent at 11/9/2017 11:49 AM CST -----  Contact: Moni      ----- Message -----  From: Susan Wilson  Sent: 11/9/2017  11:46 AM  To: Roberto ABREU Staff    Pls call Moni at [facility] [phone] or [phone].  She is wanting to attend the Cardiac Rehab and needs to know the diagnosis that will be used.    Thank you

## 2017-11-09 NOTE — TELEPHONE ENCOUNTER
Spoke with Moni Roth. Unfortunately, pt does not have any diagnosis that will cover cardiac rehab. She had sudden cardiac death, but has a nonischemic cardiomyopathy. No MI, no Angina, no CHF.

## 2017-11-14 ENCOUNTER — ANTI-COAG VISIT (OUTPATIENT)
Dept: CARDIOLOGY | Facility: CLINIC | Age: 52
End: 2017-11-14
Payer: MEDICARE

## 2017-11-14 ENCOUNTER — OFFICE VISIT (OUTPATIENT)
Dept: CARDIOLOGY | Facility: CLINIC | Age: 52
End: 2017-11-14
Payer: MEDICARE

## 2017-11-14 VITALS
DIASTOLIC BLOOD PRESSURE: 92 MMHG | HEIGHT: 64 IN | SYSTOLIC BLOOD PRESSURE: 167 MMHG | OXYGEN SATURATION: 100 % | HEART RATE: 60 BPM | BODY MASS INDEX: 45.02 KG/M2 | WEIGHT: 263.69 LBS

## 2017-11-14 DIAGNOSIS — I48.0 PAROXYSMAL ATRIAL FIBRILLATION: ICD-10-CM

## 2017-11-14 DIAGNOSIS — I42.9 CARDIOMYOPATHY, UNSPECIFIED TYPE: ICD-10-CM

## 2017-11-14 DIAGNOSIS — I10 ESSENTIAL HYPERTENSION: Primary | ICD-10-CM

## 2017-11-14 DIAGNOSIS — G43.901 STATUS MIGRAINOSUS: ICD-10-CM

## 2017-11-14 DIAGNOSIS — R42 VERTIGO: ICD-10-CM

## 2017-11-14 DIAGNOSIS — E78.00 PURE HYPERCHOLESTEROLEMIA: ICD-10-CM

## 2017-11-14 DIAGNOSIS — R41.89 COGNITIVE IMPAIRMENT: ICD-10-CM

## 2017-11-14 DIAGNOSIS — Z79.01 LONG-TERM (CURRENT) USE OF ANTICOAGULANTS: Primary | ICD-10-CM

## 2017-11-14 DIAGNOSIS — Z95.810 AUTOMATIC IMPLANTABLE CARDIOVERTER-DEFIBRILLATOR IN SITU: ICD-10-CM

## 2017-11-14 DIAGNOSIS — Z86.74 HISTORY OF SUDDEN CARDIAC ARREST: ICD-10-CM

## 2017-11-14 DIAGNOSIS — Z86.73 HISTORY OF CVA (CEREBROVASCULAR ACCIDENT): ICD-10-CM

## 2017-11-14 DIAGNOSIS — E66.01 OBESITY, CLASS III, BMI 40-49.9 (MORBID OBESITY): ICD-10-CM

## 2017-11-14 DIAGNOSIS — I42.8 NONISCHEMIC CARDIOMYOPATHY: ICD-10-CM

## 2017-11-14 DIAGNOSIS — M79.605 LEG PAIN, LEFT: ICD-10-CM

## 2017-11-14 DIAGNOSIS — I44.2 COMPLETE AV BLOCK: ICD-10-CM

## 2017-11-14 LAB — INR PPP: 2.4 (ref 2–3)

## 2017-11-14 PROCEDURE — 85610 PROTHROMBIN TIME: CPT | Mod: PBBFAC

## 2017-11-14 PROCEDURE — 99999 PR PBB SHADOW E&M-EST. PATIENT-LVL IV: CPT | Mod: PBBFAC,,, | Performed by: NURSE PRACTITIONER

## 2017-11-14 PROCEDURE — 99211 OFF/OP EST MAY X REQ PHY/QHP: CPT | Mod: PBBFAC

## 2017-11-14 PROCEDURE — 99999 PR PBB SHADOW E&M-EST. PATIENT-LVL I: CPT | Mod: PBBFAC,,,

## 2017-11-14 PROCEDURE — 99214 OFFICE O/P EST MOD 30 MIN: CPT | Mod: PBBFAC | Performed by: NURSE PRACTITIONER

## 2017-11-14 PROCEDURE — 99214 OFFICE O/P EST MOD 30 MIN: CPT | Mod: S$PBB,,, | Performed by: NURSE PRACTITIONER

## 2017-11-14 RX ORDER — ATORVASTATIN CALCIUM 40 MG/1
40 TABLET, FILM COATED ORAL EVERY MORNING
Qty: 90 TABLET | Refills: 3 | Status: SHIPPED | OUTPATIENT
Start: 2017-11-14 | End: 2018-08-21

## 2017-11-14 RX ORDER — CHLORTHALIDONE 25 MG/1
25 TABLET ORAL DAILY
Qty: 90 TABLET | Refills: 11 | Status: SHIPPED | OUTPATIENT
Start: 2017-11-14 | End: 2018-02-22 | Stop reason: SDUPTHER

## 2017-11-14 NOTE — PROGRESS NOTES
INR within range. Patient denies any bruising or bleeding. Patient states that she is not eating greens anymore. Patient states she just left cardiology visit and will start new medications, Lipitor and [place]gro[place], no DDI expected. Will maintain weekly dose until follow-up in 3 weeks. Advised patient to call if any changes or concerns.

## 2017-11-14 NOTE — PATIENT INSTRUCTIONS
Increase chlorthalidone to 25mg daily.  Restart atorvastatin 40mg daily.  Obtain a blood pressure monitor. Take daily blood pressure measurements for one week. Send measurements to cardiology clinic using [facility] or by telephone.  Blood test in one week.   Continue other medications.  Medical fitness referral provided.  Return to clinic in 3 months after fasting blood test.   Primary care suggestions: Joshua Serrano Ryan Cruz

## 2017-11-14 NOTE — PROGRESS NOTES
Ms. Chawla is a former patient of Dr. Toney (now Dr. Martins) and was last seen in [facility] 6/1/2017.      Subjective:   Patient ID:  Amna Chawla is a 52 y.o. female who presents for follow-up of Hypertension  .   HPI:    Ms. Chawla is a 51yo female with HTN, HLD, cardiomyopathy (EF 40-45%), history of cardiac arrest (2/7/2013 due to electrolyte derangement), Bi-V ICD (upgrade 10/13/2017), atrial fibrillation (diagnosed 6/2017), history of stroke (concurrent with cardiac arrest on 2/7/2013) here for follow up. She reports increasing blood pressures over the past week in clinic appts. She does not take her BP at home, athough she says she feels headache when her pressures are high, and has felt this feeling more often in recent weeks. Otherwise, Ms. Chawla denies chest pain with exertion or at rest, palpitations, SOB, MERCHANT, dizziness, syncope, leg edema, claudication, PND, or orthopnea.  Last ICD programming on 10/4/2017 showed 5% RA pacing, 100% RV pacing, Nonsustained VT: Yes x1 for 5 seconds  She is currently anticoagulated on coumadin. She denies bleeding episodes. She is also taking atorvastatin 40mg (although she stopped taking this recently). LDL now 163.2. She takes carvedilol 25mg BID, chlorthalidone 12.5mg, and valsartan 80mg. Creatinine is 0.9. Hepatic transaminases are within normal limits. She does not regularly exercise but is interested in an exercise program.  Weight is down 5lbs since last clinic visit in June 2017.    Recent Cardiac Tests:    2D Echo (9/13/2017):  CONCLUSIONS     1 - Mildly to moderately depressed left ventricular systolic function (EF 40-45%).     2 - Impaired LV relaxation, normal LAP (grade 1 diastolic dysfunction).     3 - Normal right ventricular systolic function .     4 - The estimated PA systolic pressure is greater than 26 mmHg.     5 - Trivial to mild mitral regurgitation.     6 - Trivial to mild tricuspid regurgitation.     7 - Severe left atrial enlargement.      8 - No wall motion abnormalities.     Current Outpatient Prescriptions   Medication Sig    aripiprazole (ABILIFY) 5 MG Tab Take 1 tablet (5 mg total) by mouth every evening.    atorvastatin (LIPITOR) 40 MG tablet Take 1 tablet (40 mg total) by mouth every morning.    carvedilol (COREG) 25 MG tablet TAKE 1 TABLET(25 MG) BY MOUTH TWICE DAILY WITH MEALS    chlorthalidone (HYGROTEN) 25 MG Tab Take 1 tablet (25 mg total) by mouth once daily.    clonazePAM (KLONOPIN) 1 MG tablet Take 1 tablet (1 mg total) by mouth daily as needed for Anxiety.    donepezil (ARICEPT) 10 MG tablet Take 1 tablet (10 mg total) by mouth 2 (two) times daily.    duloxetine (CYMBALTA) 60 MG capsule Take 1 capsule (60 mg total) by mouth 2 (two) times daily.    gabapentin (NEURONTIN) 300 MG capsule Take 3 capsules (900 mg total) by mouth 3 (three) times daily.    hydrocodone-acetaminophen 5-325mg (NORCO) 5-325 mg per tablet Take 1 tablet by mouth every 6 (six) hours as needed.    lorazepam (ATIVAN) 0.5 MG tablet Take 1 tablet (0.5 mg total) by mouth every 6 (six) hours as needed for Anxiety.    magnesium 200 mg Tab Take 200 mg by mouth once daily.    ondansetron (ZOFRAN-ODT) 4 MG TbDL Take 1 tablet (4 mg total) by mouth every 24 hours as needed (nausea).    pantoprazole (PROTONIX) 40 MG tablet Take 1 tablet (40 mg total) by mouth once daily.    tiagabine (GABITRIL) 4 MG tablet Take 4 mg by mouth every evening.    tizanidine (ZANAFLEX) 4 MG tablet Take 4 mg by mouth every 6 (six) hours as needed.    topiramate (TOPAMAX) 100 MG tablet Take 2 tablets (200 mg total) by mouth 2 (two) times daily.    trazodone (DESYREL) 100 MG tablet Take 2 tablets (200 mg total) by mouth every evening.    valsartan (DIOVAN) 80 MG tablet Take 1 tablet (80 mg total) by mouth once daily.    VITAMIN D2 50,000 unit capsule TAKE 1 CAPSULE BY MOUTH EVERY 7 DAYS    warfarin (COUMADIN) 5 MG tablet TAKE 1 1/2 TABLET BY MOUTH ON TUESDAYS, THURSDAYS AND  ". TAKE 2 TABLETS BY MOUTH ON ALL OTHER DAYS    zolpidem (AMBIEN) 10 mg Tab Take 1 tablet (10 mg total) by mouth nightly as needed.     Review of Systems   Constitution: Negative for malaise/fatigue.   Eyes: Negative for blurred vision.   Cardiovascular: Negative for chest pain, claudication, dyspnea on exertion, irregular heartbeat, leg swelling, orthopnea, palpitations, paroxysmal nocturnal dyspnea and syncope.   Respiratory: Negative for shortness of breath.    Hematologic/Lymphatic: Negative for bleeding problem.   Skin: Negative for rash.   Musculoskeletal: Negative for myalgias.   Gastrointestinal: Negative for abdominal pain, constipation, diarrhea and nausea.   Genitourinary: Negative for hematuria.   Neurological: Positive for headaches. Negative for loss of balance and numbness.   Psychiatric/Behavioral: Negative for altered mental status.   Allergic/Immunologic: Negative for persistent infections.     Objective:     Right Arm BP - Sittin/86 (17 09)  Left Arm BP - Sittin/92 (17)    BP (!) 167/92 (BP Location: Left arm, Patient Position: Sitting, BP Method: X-Large (Automatic))   Pulse 60   Ht 5' 4" (1.626 m)   Wt 119.6 kg (263 lb 10.7 oz)   SpO2 100%   BMI 45.26 kg/m²     Physical Exam   Constitutional: She is oriented to person, place, and time. She appears well-developed and well-nourished.   HENT:   Head: Normocephalic.   Nose: Nose normal.   Eyes: Pupils are equal, round, and reactive to light.   Neck: No JVD present. Carotid bruit is not present.   Cardiovascular: Normal rate, regular rhythm, S1 normal and S2 normal.  Exam reveals no gallop.    No murmur heard.  Pulses:       Carotid pulses are 2+ on the right side, and 2+ on the left side.       Radial pulses are 2+ on the right side, and 2+ on the left side.        Dorsalis pedis pulses are 2+ on the right side, and 2+ on the left side.   Pulmonary/Chest: Breath sounds normal. No respiratory distress. "   Abdominal: Soft. Bowel sounds are normal. She exhibits no distension. There is no tenderness.   Musculoskeletal: Normal range of motion. She exhibits no edema.   Neurological: She is alert and oriented to person, place, and time.   Skin: Skin is warm and dry. No erythema.   Psychiatric: She has a normal mood and affect. Her speech is normal and behavior is normal.   Nursing note and vitals reviewed.    Lab Results   Component Value Date     10/10/2017    K 4.1 10/10/2017    MG 1.9 09/12/2017     10/10/2017    CO2 27 10/10/2017    BUN 13 10/10/2017    CREATININE 0.9 10/10/2017    GLU 95 10/10/2017    HGBA1C 6.0 02/02/2014    AST 12 09/12/2017    ALT 17 09/12/2017    ALBUMIN 2.7 (L) 09/12/2017    PROT 8.2 09/12/2017    BILITOT 0.4 09/12/2017    WBC 2.46 (L) 10/10/2017    HGB 12.4 10/10/2017    HCT 36.6 (L) 10/10/2017    MCV 86 10/10/2017     10/10/2017    TSH 1.407 03/13/2017    CHOL 221 (H) 03/20/2017    HDL 43 03/20/2017    LDLCALC 163.2 (H) 03/20/2017    TRIG 74 03/20/2017         Recent Labs  Lab 10/19/17  1105 10/27/17  1116 11/03/17  1008 11/14/17  1115   INR 1.4 1.6 2.3 2.4        Test(s) Reviewed  I have reviewed the following in detail:  [] Stress test   [] Angiography   [] Echocardiogram   [x] Labs   [] Other:         Assessment:         1. Essential hypertension. Patient with elevated BPs. Will increase chlorthalidone to 25mg daily. Patient advised to take home BPs and report to clinic. If elevation persists, will add amlodipine 5mg to regimen.      2. Cardiomyopathy, unspecified type. EF 40-45%. Patient on BB, ARB.       3. Complete AV block. Patient has Bi-V ICD, placed 10/13/2017. Followed by Dr. Mejia.      4. Pure hypercholesterolemia. . ASCVD risk 15.3%. Will ensure patient restarts atorvastatin 40mg.      5. Nonischemic cardiomyopathy from RV pacing. EF 40%. Bi-V ICD recently place. There are no signs of significant fluid overload on clinical exam.     6. Paroxysmal  atrial fibrillation. On coumadin and carvedilol.      7. Automatic implantable cardioverter-defibrillator in situ.  Patient has Bi-V ICD, placed 10/13/2017. Followed by Dr. Mejia.     8. History of CVA (cerebrovascular accident). In context of cardiac arrest.     9. Obesity, Class III, BMI 40-49.9 (morbid obesity). BMI 45.26. Will refer to [facility].      10. History of sudden cardiac arrest. ICD in place.      Plan:     Amna was seen today for hypertension.    Diagnoses and all orders for this visit:    Essential hypertension  -     Basic metabolic panel; Future; Expected date: 11/21/2017  -     chlorthalidone (HYGROTEN) 25 MG Tab; Take 1 tablet (25 mg total) by mouth once daily.  -     Lipid panel; Future; Expected date: 02/12/2018  -     Comprehensive metabolic panel; Future; Expected date: 02/12/2018    Cardiomyopathy, unspecified type  -     Ambulatory Referral to [facility] ([place])  -     [facility] ([facility]  -     [facility] Patient Entered [facility] ([place])    Complete AV block    Pure hypercholesterolemia  -     atorvastatin (LIPITOR) 40 MG tablet; Take 1 tablet (40 mg total) by mouth every morning.  -     Lipid panel; Future; Expected date: 02/12/2018    Nonischemic cardiomyopathy from RV pacing    Paroxysmal atrial fibrillation    Automatic implantable cardioverter-defibrillator in situ    History of CVA (cerebrovascular accident)    Obesity, Class III, BMI 40-49.9 (morbid obesity)  -     Ambulatory Referral to [facility] ([place])  -     [facility] ([place])  -     [facility] Patient Entered [facility] ([facility])    History of sudden cardiac arrest    Status migrainosus    Vertigo    Leg pain, left    Cognitive impairment      Increase chlorthalidone to 25mg daily.  Restart atorvastatin 40mg daily.  Obtain a blood pressure monitor. Take daily blood pressure measurements for one week. Send measurements to cardiology clinic  using [facility] or by telephone.  Blood test in one week.   Continue other medications.  Medical fitness referral provided.  Return to clinic in 3 months after fasting blood test.     Return in about 3 months (around 2/14/2018).    ------------------------------------------------------------------    SAE Roe, NP-C  Consult Cardiology

## 2017-11-21 ENCOUNTER — PATIENT MESSAGE (OUTPATIENT)
Dept: ELECTROPHYSIOLOGY | Facility: CLINIC | Age: 52
End: 2017-11-21

## 2017-11-21 ENCOUNTER — TELEPHONE (OUTPATIENT)
Dept: ELECTROPHYSIOLOGY | Facility: CLINIC | Age: 52
End: 2017-11-21

## 2017-11-21 NOTE — TELEPHONE ENCOUNTER
Spoke with patient and advised that if she can get here by 4pm, we get have someone in the device clinic take a look at her. She is calling her daughter now.

## 2017-11-21 NOTE — TELEPHONE ENCOUNTER
Called pt to re-visit our earlier conversation regarding her device site.  Pt's daughter, Miladys, looked at her site, agrees it does not look infected.  Pt will report to clinic in the am just to have this seen by someone in the office.  Appt provided for 8:30am and daughter and pt accepted.

## 2017-11-21 NOTE — TELEPHONE ENCOUNTER
Spoke to patient by phone re: ICD site.  Pt is post op ICD gen change 10/13/17.  Reported to clinic for routine post op appt 10/27/17 and had no reported problems.  She states now she has some soreness at the area.  Ms. Chawla was asked specifically regarding the following:  denies fever, warmth or drainage.  She stated the site is not red.  There are no visible sutures.  She denied any trauma to the site/chest.  She notes there is an area that is tender to touch, almost can pinpoint a spot rather than the entire pocket; this area is just below the incision.  She was offered an appointment to come now, however, her daughter is her transportation and will not be available until after 4:30pm today.  She will ask her daughter to look at the site and call us this afternoon for further recommendations (ED vs. Clinic appt).  Will await her return call.

## 2017-11-22 ENCOUNTER — CLINICAL SUPPORT (OUTPATIENT)
Dept: ELECTROPHYSIOLOGY | Facility: CLINIC | Age: 52
End: 2017-11-22
Payer: MEDICARE

## 2017-11-22 NOTE — PROGRESS NOTES
Pt reported to clinic today with c/o tenderness in her AICD pocket.  S/P pulse generator replacement 10/13/17.  Reported to clinic for routine post op appt 10/27/17 and had no reported problems.  The pocket is intact, there is some hypertrophy of the scar.  No redness or warmth noted.  Mildly tender to touch.  No swelling.  Dr. Hallman in to see patient.  She was advised that the site is likely still healing and the device has not been encapsulated yet.  Recommended OTC tylenol for pain.  Continue to monitor, report any ongoing issues at her 3 mos post op EP clinic appt or sooner if needed.   Report redness or fever immediately.

## 2017-11-24 ENCOUNTER — DOCUMENTATION ONLY (OUTPATIENT)
Dept: REHABILITATION | Facility: HOSPITAL | Age: 52
End: 2017-11-24

## 2017-11-24 ENCOUNTER — OUTPATIENT CASE MANAGEMENT (OUTPATIENT)
Dept: ADMINISTRATIVE | Facility: OTHER | Age: 52
End: 2017-11-24

## 2017-11-24 PROBLEM — M79.10 MUSCLE TENDERNESS: Status: RESOLVED | Noted: 2017-09-11 | Resolved: 2017-11-24

## 2017-11-24 PROBLEM — M62.81 MUSCLE WEAKNESS OF LOWER EXTREMITY: Status: RESOLVED | Noted: 2017-09-11 | Resolved: 2017-11-24

## 2017-11-24 PROBLEM — M53.86 DECREASED ROM OF LUMBAR SPINE: Status: RESOLVED | Noted: 2017-09-11 | Resolved: 2017-11-24

## 2017-11-24 NOTE — PROGRESS NOTES
2nd attempt to follow up for OPCM, left message requesting a return call. As this is my second unsuccessful follow up for OPCM, I will mail a letter with my contact information. Gala WALLACE-OPCM

## 2017-11-24 NOTE — PROGRESS NOTES
Pt did not present for scheduled appointments. Pt was seen for 1 visit on 9/11. Goal achievement unable to be assessed secondary to patient not returning. Pt discharged from plan of care at this time.

## 2017-11-24 NOTE — LETTER
November 24, 2017    Amnasa BELLE Chawla  [address]  [place] [address]             [facility]  [address]  [place] [address] Dear Ms. Chawla,    I work with [facility] Outpatient [facility] Management Department. I have been unsuccessful at reaching you to follow-up to see how you have been doing. Please call me back at your earliest convenience to discuss your health care needs.      I can be reached at [phone] from 8:00AM to 4:30 PM on Monday thru Friday. [facility] also has a program where a nurse is available 24/7 to answer questions or provide medical advice, their number is [phone].    Thanks,      Mone Hawthorne RN  Outpatient Case Management

## 2017-11-27 ENCOUNTER — LAB VISIT (OUTPATIENT)
Dept: LAB | Facility: HOSPITAL | Age: 52
End: 2017-11-27
Payer: MEDICARE

## 2017-11-27 DIAGNOSIS — I10 ESSENTIAL HYPERTENSION: ICD-10-CM

## 2017-11-27 LAB
ANION GAP SERPL CALC-SCNC: 6 MMOL/L
BUN SERPL-MCNC: 10 MG/DL
CALCIUM SERPL-MCNC: 9 MG/DL
CHLORIDE SERPL-SCNC: 107 MMOL/L
CO2 SERPL-SCNC: 25 MMOL/L
CREAT SERPL-MCNC: 0.8 MG/DL
EST. GFR  (AFRICAN AMERICAN): >60 ML/MIN/1.73 M^2
EST. GFR  (NON AFRICAN AMERICAN): >60 ML/MIN/1.73 M^2
GLUCOSE SERPL-MCNC: 85 MG/DL
POTASSIUM SERPL-SCNC: 3.7 MMOL/L
SODIUM SERPL-SCNC: 138 MMOL/L

## 2017-11-27 PROCEDURE — 80048 BASIC METABOLIC PNL TOTAL CA: CPT

## 2017-11-27 PROCEDURE — 36415 COLL VENOUS BLD VENIPUNCTURE: CPT

## 2017-12-04 ENCOUNTER — HOSPITAL ENCOUNTER (EMERGENCY)
Facility: OTHER | Age: 52
Discharge: HOME | End: 2017-12-04
Attending: EMERGENCY MEDICINE
Payer: MEDICARE

## 2017-12-04 VITALS
RESPIRATION RATE: 18 BRPM | SYSTOLIC BLOOD PRESSURE: 179 MMHG | OXYGEN SATURATION: 97 % | DIASTOLIC BLOOD PRESSURE: 94 MMHG | TEMPERATURE: 99 F | WEIGHT: 267 LBS | HEART RATE: 98 BPM | HEIGHT: 64 IN | BODY MASS INDEX: 45.58 KG/M2

## 2017-12-04 DIAGNOSIS — B34.9 ACUTE VIRAL SYNDROME: Primary | ICD-10-CM

## 2017-12-04 DIAGNOSIS — R05.9 COUGH: ICD-10-CM

## 2017-12-04 LAB
FLUAV AG SPEC QL IA: NEGATIVE
FLUBV AG SPEC QL IA: NEGATIVE
SPECIMEN SOURCE: NORMAL

## 2017-12-04 PROCEDURE — 87400 INFLUENZA A/B EACH AG IA: CPT

## 2017-12-04 PROCEDURE — 99283 EMERGENCY DEPT VISIT LOW MDM: CPT

## 2017-12-04 PROCEDURE — 99284 EMERGENCY DEPT VISIT MOD MDM: CPT

## 2017-12-05 ENCOUNTER — TELEPHONE (OUTPATIENT)
Dept: NEUROLOGY | Facility: CLINIC | Age: 52
End: 2017-12-05

## 2017-12-05 ENCOUNTER — PATIENT MESSAGE (OUTPATIENT)
Dept: NEUROLOGY | Facility: CLINIC | Age: 52
End: 2017-12-05

## 2017-12-05 NOTE — ED NOTES
Pt asked to take home medication Tiagaban 4mg that is prescribed for headaches and MD stated it was ok. Pt given d/c instructions to include home care and follow up care and verbalized understanding. Pt d/c in stable condition.

## 2017-12-05 NOTE — ED TRIAGE NOTES
"Pt presents with c/o flu like symptoms, onset yesterday. Pt reports generalized body aches, + chest congestion, cough, HA. Pt reports cough sputum is "clear mucus". Pt reports fever of 102 at home. Pt took two tylenols with some improvement. + nausea, emesis X 1 yesterday and X 1 today. Pt denies diarrhea but reports stools are soft. Pt in NAD.   "

## 2017-12-05 NOTE — TELEPHONE ENCOUNTER
----- Message from Joshua Morrison sent at 12/5/2017 11:30 AM CST -----  Contact: Patient @  [phone]  Patient is calling to get an appt in the infusion center this week, pls call

## 2017-12-05 NOTE — PROVIDER PROGRESS NOTES - EMERGENCY DEPT.
Encounter Date: 12/4/2017    ED Physician Progress Notes         The patient was seen in our private triage room by the myself for Medical Screening Exam. The patient is stable. Awaiting room in the ED.

## 2017-12-14 ENCOUNTER — OUTPATIENT CASE MANAGEMENT (OUTPATIENT)
Dept: ADMINISTRATIVE | Facility: OTHER | Age: 52
End: 2017-12-14

## 2017-12-14 NOTE — PROGRESS NOTES
"Chart reviewed. Spoke with pt who reports she received educational literature and denies any questions at this time. Pt reports she is getting over a "cold" and is currently taking a z-mark prescribed by her PCP-Dr Child, an [facility] provider. Pt reports she has not been taking her BP due to being sick. Discussed with pt the importance of monitoring and logging her BP daily. Discussed signs and symptoms of hypertension and hypotension. Pt reports her defibrillator site is healing and denies any issues currently. Discussed signs and symptoms of infection at surgical site. Will follow up. JERRI Hawthorne RN-OPCM     Follow up plan:     Continue to educate about risk of infection. Review signs and symptoms of infection. Encourage patient to follow medication and treatment regimen. Encourage patient to maintain follow up with doctors.  "

## 2017-12-15 ENCOUNTER — ANTI-COAG VISIT (OUTPATIENT)
Dept: CARDIOLOGY | Facility: CLINIC | Age: 52
End: 2017-12-15
Payer: MEDICARE

## 2017-12-15 ENCOUNTER — TELEPHONE (OUTPATIENT)
Dept: NEUROLOGY | Facility: CLINIC | Age: 52
End: 2017-12-15

## 2017-12-15 DIAGNOSIS — G43.711 CHRONIC MIGRAINE WITHOUT AURA, WITH INTRACTABLE MIGRAINE, SO STATED, WITH STATUS MIGRAINOSUS: Primary | ICD-10-CM

## 2017-12-15 DIAGNOSIS — Z79.01 LONG-TERM (CURRENT) USE OF ANTICOAGULANTS: Primary | ICD-10-CM

## 2017-12-15 DIAGNOSIS — I48.0 PAROXYSMAL ATRIAL FIBRILLATION: ICD-10-CM

## 2017-12-15 LAB — INR PPP: 2.8 (ref 2–3)

## 2017-12-15 PROCEDURE — 85610 PROTHROMBIN TIME: CPT | Mod: PBBFAC | Performed by: PHARMACIST

## 2017-12-15 NOTE — TELEPHONE ENCOUNTER
----- Message from Collin Zaldivar sent at 12/15/2017  8:10 AM CST -----  Contact: Pt  Pt would like to know when the next apt for her botox shot.        Call back number: [phone]

## 2017-12-27 ENCOUNTER — ANTI-COAG VISIT (OUTPATIENT)
Dept: CARDIOLOGY | Facility: CLINIC | Age: 52
End: 2017-12-27
Payer: MEDICARE

## 2017-12-27 ENCOUNTER — PROCEDURE VISIT (OUTPATIENT)
Dept: NEUROLOGY | Facility: CLINIC | Age: 52
End: 2017-12-27
Payer: MEDICARE

## 2017-12-27 VITALS
SYSTOLIC BLOOD PRESSURE: 132 MMHG | DIASTOLIC BLOOD PRESSURE: 81 MMHG | HEIGHT: 64 IN | WEIGHT: 266.75 LBS | BODY MASS INDEX: 45.54 KG/M2 | HEART RATE: 63 BPM

## 2017-12-27 DIAGNOSIS — G43.711 CHRONIC MIGRAINE WITHOUT AURA, WITH INTRACTABLE MIGRAINE, SO STATED, WITH STATUS MIGRAINOSUS: Primary | ICD-10-CM

## 2017-12-27 DIAGNOSIS — I48.0 PAROXYSMAL ATRIAL FIBRILLATION: ICD-10-CM

## 2017-12-27 DIAGNOSIS — Z79.01 LONG-TERM (CURRENT) USE OF ANTICOAGULANTS: Primary | ICD-10-CM

## 2017-12-27 DIAGNOSIS — Z86.73 HISTORY OF CVA (CEREBROVASCULAR ACCIDENT): ICD-10-CM

## 2017-12-27 DIAGNOSIS — G50.0 SUPRAORBITAL NEURALGIA: ICD-10-CM

## 2017-12-27 LAB — INR PPP: 1.8 (ref 2–3)

## 2017-12-27 PROCEDURE — 85610 PROTHROMBIN TIME: CPT | Mod: PBBFAC

## 2017-12-27 PROCEDURE — 64615 CHEMODENERV MUSC MIGRAINE: CPT | Mod: S$PBB,,, | Performed by: PSYCHIATRY & NEUROLOGY

## 2017-12-27 PROCEDURE — 64615 CHEMODENERV MUSC MIGRAINE: CPT | Mod: PBBFAC | Performed by: PSYCHIATRY & NEUROLOGY

## 2017-12-27 PROCEDURE — 99211 OFF/OP EST MAY X REQ PHY/QHP: CPT | Mod: S$PBB,,,

## 2017-12-27 RX ORDER — OXYCODONE AND ACETAMINOPHEN 10; 325 MG/1; MG/1
1 TABLET ORAL EVERY 8 HOURS PRN
Qty: 30 TABLET | Refills: 0 | Status: SHIPPED | OUTPATIENT
Start: 2017-12-27 | End: 2018-03-07 | Stop reason: SDUPTHER

## 2017-12-27 RX ADMIN — ONABOTULINUMTOXINA 200 UNITS: 100 INJECTION, POWDER, LYOPHILIZED, FOR SOLUTION INTRADERMAL; INTRAMUSCULAR at 11:12

## 2017-12-27 NOTE — PROGRESS NOTES
INR low today for no apparent reason. Patient denies any bleeding, bruising or changes. Will boost dose today and then re challenge weekly dose until follow-up in 2 weeks. Advised her to call with any changes or concerns.

## 2017-12-27 NOTE — PROCEDURES
Follow up:   Flare up of TMJ and HA during daughter's wedding   NSAIDS helped   2 weeks ago BOTOX effect wore off      Prior note:   2 weeks ago BOTOX effect wore off   Has severe spasm and pain in the traps catalina   Weather change has provoked severe migraine + nausea   She started coumadin       Prior note:   3 weeks ago BOTOX effect wore off   She reports severe daily HA    During BOTOX periods she feels she  her HA. Much less intense      Prior note:   The patient is a 51-year-old female who [facility]. Since her last visit, she reports growing frustration   about the fact that nothing has helped her with regards to her headaches. She   continues to experience daily headaches. She takes mefenamic acid and   tizanidine every night, which only provides her two hours of relief. She is   unable to sleep because of the refractory headaches. She is incapacitated   because of severe headaches. She reports minimal relief with infusions that she  gets on a periodic basis. She does report an improvement overall with the   Botox. However, this effect has worn off in the last two weeks. She also   reports an episode wherein she fell with loss of consciousness, which was not   provoked. As a result, she injured her ankle and is currently wearing a boot.      Prior note:   since last week - Reports vertigo for 6 - 7 minutes upto 3 times daily   Nearly daily severe HA - no response to ponstel , compazine   She is late for her BOTOX - last 2 weeks were painful       Follow up:   R sided Headache is constant max at TMJ on R   Prior note:   She reports new episodes of R face tingling. Sh also reports 2 episodes of blurred vision lasting 15 minutes. These hapended while watching TV. Her pain remains unchanged   Follow up:   2 days of severe HA during Thanksgiving   Pounding bifrontal region   Pain worse over L eye brow   Follow up:   Reports bifrontal severe HA (worse on L)  "with no nausea with sudden onset . Occurs daily   NO note:  I found no papilledema or other changes to suggest elevated intracranial pressure or other alternative etiology for her headaches. Repeat exam in two years.  HA are less frequent and less intense.   (R levator scapula spasm)   symptoms better with lateral flexion to L   Prior note:   She still has daily HA with severe sharp stabbing pain behind L eye lasting for 15 sec   Prior note:   Daily pain. 2/week - nausea.  Shu Lares RN at 9/17/2014 4:53 PM  Pt presented to clinic for medical advice. Pt is seen by Dr. Paredes and Dr. Cortez.  1) She went ER on 9/13/14 for a migraine. She was given Reglan, Benadryl, MSO4 and IVF. A couple days later she developed an all over body "tremor". She states "I think I have Parkinson's". - Per Dr. Paredes, medication related tremor (Reglan & Benadryl). Informed her it should wear off in a few days. If not, she is to call and let us know.  2) Headaches - triggered by insomnia.   Current meds:  Ketoprofen - she took it once and said her "throat closed up" and she's not supposed to have anything with aspirin in it.  Nortriptyline - she was taking it at night, but it didn't help her sleep, so she stopped it.  Per Dr. Cortez, discontinue Ketoprofen and Nortriptyline. Start Effexor XR 37.5mg QD, tizanidine 4mg BID PRN headache and Klonopin 0.25 - 0.5mg QHS PRN. Will schedule pt for sphenocath procedure within the next week.   Prior note:   48 yo woman with history of HTN and asthma, both reportedly controlled, in hospital early 2013 after "passed out" and became unresponsive. CPR in the field for 8 minutes until EMS arrived. Per ED note, on arrival she was found to be in PEA, given epinephrine. Vfib/Vtach was achieved which was shocked x1. Patient converted to sinus tachycardia after shock. I do not know the time course for the above treatment. On arrival to facility patient was in sinus tachycardia with strong pulses, " "intubated and unresponsive. ET tube placement was confirmed with direct laryngoscopy and good bilateral breath sounds were heard after the tube was pulled back 2 cm. Reported to have "withdrawal" movements in ER during stabilization, then sedated and cooling protocol initiated. Had remarkable   recovery and first seen in clinic in 2013.   Headache history: cluster   Age of onset -    Location - behind L eye   Nature of pain - sharp   Prodrome - no   Aura - No   Duration of headache - 6-7 min   Time to peak intensity -   Pain scale - range of intensity - 9/10   Frequency - daily   Status Migrainosus history - 1-3 days   Time of day of most headaches- anytime   Associated symptoms with the headache:   Red eyes   swelling of L face   Headache history: Migraine   Age of onset -    Location - bifrontal   Nature of pain - Pounding   Prodrome - no   Aura - No   Duration of headache - > 4 hrs   Time to peak intensity -   Pain scale - range of intensity - 9/10   Frequency - 5/week   Status Migrainosus history - 1-3 days   Time of day of most headaches- anytime   Associated symptoms with the headache:   Meningeal symptoms - photophobia, phonophobia, exercise intolerance +   Nausea/vomitting +   Nasal drainage   Visual blurriness +   Pallor/flushing   Dizziness   Vertigo   Confusion   Impaired concentration +   Pain worsened with physical activity +   Neck pain +   Symptoms of increased intracranial pressure:   Whooshing sounds - no   Visual spots/blotches - no   Headache Triggers: unknown   Other comorbid conditions:   Anxiety - no   Motion sickness symptom - no       Treatment history:   Resolution of headache with sleep - yes       Meds tried:   Trigger points involvin/9/15 helped for 1 day   Site: Right   Levator scapula   Pharmacological agent:   0.5% Sensorcaine solution   No complications were noted.  Supraorbital block - helped for 2 days   Tylenol - not help   imitrex - chest tightness   alleve - " "help   topamax - not helping   Neurontin - not helping   Paxil, Elavil - not help   seroquel - nightmare   Ketoprofen - she took it once and said her "throat closed up" and she's not supposed to have anything with aspirin in it.  Nortriptyline - she was taking it at night, but it didn't help her sleep, so she stopped it.  reglan - parkinsonism   zanaflex - help   Toradol 30 mg IM inj at home - too expensive   BOTOX round #1 9/3/15 - helped (R levator scapula spasm)   Round #2 12/3/15 - helped   Round#3 3/316 - helped   Round #4 6/1/16 - helped   BOTOX#5 9/15/16 - helped     BOTOX#6 - 11/30/16 - helped   BOTOX#7 - 3/9/17 - helped    BOTOX#8 - 5/25/17 - helped    BOTOX#9 8/10/17 - helped   BOTOX#10 10/19/17 - helped      Can not do RFA - pt has pacemaker   Procedure: IOVERA peripheral nerve focus cold therapy (non ablative cryotherapy) 12/30/15 - not help   Indication: Cryotherapy of select peripheral nerves of the head was performed to treat chronic headaches that failed to respond to several preventive pharmacological therapies.   Sedation: None   Informed consent: The procedure, risks, benefits and options were discussed with the patient. There are no contraindications to the procedure. The patient expressed understanding and agreed to the procedure. I verify that I personally obtained the patient's consent prior to the start of the procedure.  Location:  Bilateral Supra orbital nerve (3 cycles on R and 1 cycle on L)   Bilateral Occipital nerve   3 cycles   Pain relief: Pain score reduced 10/10->0/10   9/26 Bilateral Sphenopalatine Ganglion and bilateral Maxillary Branch (Trigeminal Nerve) Block - no help   ONB - not help                                            Shannon Pagan RN at 12/31/2014 3:54 PM       Status: Signed                   Expand All Collapse All   HEADACHE FASTTRACK  PAIN 7/10 NAUSEA 0/10  ROUND 1: TORADOL, IMITREX, MORPHINE, BENADRYL, AND MAGNESIUM  PAIN 7/10 NAUSEA 0/10  PT STATED SHE WAS READY " TO GO HOME AND GO TO SLEEP. PT D/C'ED IN NAD            Shannon Pagan RN at 10/5/2015 12:49 PM       Status: Signed                   Expand All Collapse All   HEADACHE FASTTRACK  PAIN 8/10 NAUSEA 10/10  FIRST ROUND: tORADOL, BENADRYL, COMPAZINE, IMITREX, MAGNESIUM, AND VALPROATE.  PAIN 1/10 NAUSEA 0/10  PT READY TO GO HOME AND FEELING BETTER. PT LEFT IN NAD WITH FAMILY MEMBER  TOTAL TIME: 0219-3132       Shannon Pagan RN at 10/20/2015 3:05 PM       Status: Signed                   Expand All Collapse All   HEADACHE FASTTRACK  PAIN 10/10 NAUSEA 0/10  FIRST ROUND: tORADOL, BENADRYL, COMPAZINE, IMITREX, MAGNESIUM, AND VALPROATE.  BP BEING MONITORED EVERY 15 MIN AND REMAINED 170'S/80-90'S. DR CHOPRA NOTIFIED AND STATED TO MAKE SURE BP DID NOT EXCEED 180 SYSTOLIC OR PT SHOULD REPORT TO ED. BP AT 1500 183/92. DR CHOPRA NOTIFIED AND GAVE ORDER TO STOP INFUSION AND SEND PATIENT TO ED. PT BROUGHT TO ED BY INFUSION NURSE VIA WHEELCHAIR.   PAIN 8/10 NAUSEA 0/10  TOTAL TIME: 5844-3983               Progress Notes                           Shannon Pagan RN at 2/24/2016 1:36 PM       Status: Signed                  Expand All Collapse All   HEADACHE FASTTRACK  PAIN 10/10 NAUSEA 7/10  FIRST ROUND: tORADOL, BENADRYL, AND MORPHINE-BP RANGING FROM 177-203/84-92, HR 60-82  MD NOTIFIED AND GAVE ORDERS TO SEND TO ED. PT LEFT VIA W/C WITH INFUSION NURSE ON WAY TO ED.            Headache risk factors:   H/o TBI - [facility] (2013) + neck sprain   H/o Meningitis - no   F/h Aneurysms - no       Review of Systems   Constitutional: Negative.   HENT: Negative.   Eyes: Negative.   Respiratory: Negative.   Cardiovascular: Negative.   Gastrointestinal: Negative.   Endocrine: Negative.   Genitourinary: Negative.   Musculoskeletal: Negative.   Skin: Negative.   Allergic/Immunologic: Negative.   Hematological: Negative.   Psychiatric/Behavioral: insomnia      Objective:     Physical Exam   Constitutional: She is oriented to person, place, and time. She  appears well-developed.   obesity   HENT:   Head: Normocephalic and atraumatic.   Right Ear: External ear normal.   Left Ear: External ear normal.   Nose: Nose normal.   Mouth/Throat: Oropharynx is clear and moist.   Eyes: Conjunctivae and EOM are normal. Pupils are equal, round, and reactive to light.   Neck: Normal range of motion.   Cardiovascular: Regular rhythm.   Pulmonary/Chest: Effort normal and breath sounds normal.   Musculoskeletal: Normal range of motion.   Neurological: She is alert and oriented to person, place, and time. She has normal reflexes. She displays normal reflexes. No cranial nerve deficit. She exhibits normal muscle tone. Coordination normal. Uses cane.   Ataxic gait - wide based   Skin: Skin is warm and dry.   Psychiatric: She has a normal mood and flat affect. Her behavior is normal. Judgment and thought content normal.   Headache Exam:  Optic disc is flat OU   Temples appear symmetric  No V1,V2,V3 distribution allodynia/hyperalgesia catalina   No facial swelling  No gross TMJ abnormalities   No ptosis   No conj injection   No meningismus   No neck stiffness  No C spine tenderness  Cervical facet tenderness on palpation catalina   No tender points over trapezium   catalina supraorbital nerve exit point tenderness  catalina occipital nerve exit point tenderness  No auriculotemporal nerve tenderness   Tenderness of levator scapula catalina           Assessment:     1.  Occipital neuralgia     2.  Cervicalgia     3.  4  5  6 Chronic migraine without aura, with intractable migraine, so stated, with status migrainosus (post traumatic) post concussive?  Depression   TMJ - R sided   Insomnia       Head CT  Findings: There is no evidence of acute or recent major vascular territory cerebral infarction, parenchymal hemorrhage, or intra-axial mass.  There are no extra-axial masses or abnormal fluid collections.  The ventricular system is within normal limits of size for age and shows no distortion by mass-effect or evidence  of hydrocephalus.  There is no fracture or destructive osseous lesion.  The extracranial soft tissues are unremarkable.  The visualized paranasal sinuses and mastoid air cells are clear.   Impression    No acute intracranial abnormality.  ______________________________________     Electronically signed by resident: KRISSY MAYERS MD  Date: 03/14/16  Time: 17:09            Results for PUSHPA KEY (MRN [id number]) as of 9/3/2015 14:26     Ref. Range  7/20/2015 11:06  8/19/2015 14:15    Vitamin B-12  Latest Range: 210-950 pg/mL  383       Retinol, serum  Latest Range:  ug/dL     52    Vitamin B2   Latest Range: 1-19 mcg/L      2     Vitamin B6   Latest Range: 5-50 ug/L      4 (L)     Vit D, 25-Hydroxy   Latest Range: 30-96 ng/mL   13 (L)           Plan:     1. Prophylactic medication -   Despiramine 25 mg AM (for dizziness)   Cymbalta 120 mg daily   Aricept 20 mg daily   Neurontin 900 mg TID   Magnesium 200 mg daily  Zanaflex 8 mg PRN   Topamax 200 mg BID   Cont B2, B6 supplements   2, Breakthrough headache - zanaflex 8 mg, Gabitril 8 mg  PRN percocet Q=30  Multiple alternative treatment options were offered to the patient   3. Refrain from over counter pain medications. Discussed medication overuse headache.cont Magnesium 400 mg PO BID   4. Occipital neuralgia - If medical management is ineffective may consider occipital nerve blocks.   5. I again urged the patient to keep a headache calender.    f/up Dr. Rock for TBI    B12 injection - 5/25/17     BOTOX was performed as an indicated therapy for intractable chronic migraine headaches given that the patient failed > 5 prophylactic medications  Botulinum Toxin Injection Procedure   Pre-operative diagnosis: Chronic migraine   Post-operative diagnosis: Same   Procedure: Chemical neurolysis   After risks and benefits were explained including bleeding, infection, worsening of pain, damage to the areas being injected, weakness of muscles, loss of muscle control,  dysphagia if injecting the head or neck, facial droop if injecting the facial area, painful injection, allergic or other reaction to the medications being injected, and the failure of the procedure to help the problem, a signed consent was obtained.   The patient was placed in a comfortable area and the sites to be treated were identified.The area to be treated was prepped three times with alcohol and the alcohol allowed to dry. Next, a 30 gauge needle was used to inject the medication in the area to be treated.   Area(s) injected:   Total Botox used: 175 Units   Botox wastage: 25 Units   Injection sites:    muscle bilaterally ( a total of 10 units divided into 2 sites)   Procerus muscle (5 units)   Frontalis muscle bilaterally (a total of 20 units divided into 4 sites)   Temporalis muscle bilaterally (a total of 40 units divided into 8 sites)   Occipitalis muscle bilaterally (a total of 30 units divided into 6 sites)   Cervical paraspinal muscles (a total of 20 units divided into 4 sites)   Trapezius muscle bilaterally (a total of 30 units divided into 6 sites)  Masseter 5 units x 2   Complications: none       RTC for the next Botox injection: 10 weeks

## 2018-01-02 ENCOUNTER — OUTPATIENT CASE MANAGEMENT (OUTPATIENT)
Dept: ADMINISTRATIVE | Facility: OTHER | Age: 53
End: 2018-01-02

## 2018-01-02 DIAGNOSIS — G43.711 CHRONIC MIGRAINE WITHOUT AURA, WITH INTRACTABLE MIGRAINE, SO STATED, WITH STATUS MIGRAINOSUS: Primary | ICD-10-CM

## 2018-01-02 NOTE — PROGRESS NOTES
Attempt to follow up for Outpatient Care Management; left message requesting return call. Gala RN-OPCM

## 2018-01-03 ENCOUNTER — OFFICE VISIT (OUTPATIENT)
Dept: PSYCHIATRY | Facility: CLINIC | Age: 53
End: 2018-01-03
Payer: MEDICARE

## 2018-01-03 VITALS
HEART RATE: 83 BPM | WEIGHT: 263 LBS | SYSTOLIC BLOOD PRESSURE: 162 MMHG | DIASTOLIC BLOOD PRESSURE: 78 MMHG | HEIGHT: 64 IN | BODY MASS INDEX: 44.9 KG/M2

## 2018-01-03 DIAGNOSIS — F41.9 ANXIETY: ICD-10-CM

## 2018-01-03 DIAGNOSIS — F33.1 DEPRESSION, MAJOR, RECURRENT, MODERATE: ICD-10-CM

## 2018-01-03 DIAGNOSIS — G47.00 INSOMNIA, UNSPECIFIED TYPE: ICD-10-CM

## 2018-01-03 PROCEDURE — 90833 PSYTX W PT W E/M 30 MIN: CPT | Mod: S$PBB,,, | Performed by: NURSE PRACTITIONER

## 2018-01-03 PROCEDURE — 99212 OFFICE O/P EST SF 10 MIN: CPT | Mod: PBBFAC | Performed by: NURSE PRACTITIONER

## 2018-01-03 PROCEDURE — 99213 OFFICE O/P EST LOW 20 MIN: CPT | Mod: S$PBB,,, | Performed by: NURSE PRACTITIONER

## 2018-01-03 PROCEDURE — 99999 PR PBB SHADOW E&M-EST. PATIENT-LVL II: CPT | Mod: PBBFAC,,, | Performed by: NURSE PRACTITIONER

## 2018-01-03 PROCEDURE — 90833 PSYTX W PT W E/M 30 MIN: CPT | Mod: PBBFAC | Performed by: NURSE PRACTITIONER

## 2018-01-03 RX ORDER — ARIPIPRAZOLE 5 MG/1
5 TABLET ORAL NIGHTLY
Qty: 30 TABLET | Refills: 5 | Status: SHIPPED | OUTPATIENT
Start: 2018-01-03 | End: 2018-04-03 | Stop reason: SDUPTHER

## 2018-01-03 RX ORDER — CLONAZEPAM 1 MG/1
1 TABLET ORAL DAILY PRN
Qty: 30 TABLET | Refills: 5 | Status: SHIPPED | OUTPATIENT
Start: 2018-01-03 | End: 2018-04-03 | Stop reason: SDUPTHER

## 2018-01-03 RX ORDER — ZOLPIDEM TARTRATE 12.5 MG/1
12.5 TABLET, FILM COATED, EXTENDED RELEASE ORAL NIGHTLY PRN
Qty: 30 TABLET | Refills: 5 | Status: SHIPPED | OUTPATIENT
Start: 2018-01-03 | End: 2018-02-22

## 2018-01-03 RX ORDER — DULOXETIN HYDROCHLORIDE 60 MG/1
60 CAPSULE, DELAYED RELEASE ORAL 2 TIMES DAILY
Qty: 60 CAPSULE | Refills: 5 | Status: SHIPPED | OUTPATIENT
Start: 2018-01-03 | End: 2018-04-03 | Stop reason: SDUPTHER

## 2018-01-03 NOTE — PROGRESS NOTES
Outpatient Psychiatry Follow-Up Visit (MD/NP)    1/3/2018    Clinical Status of Patient:  Outpatient (Ambulatory)    Chief Complaint:  Amna Chawla is a 52 y.o. female who presents today for follow-up of depression and anxiety.  Met with patient.  Last visit was on 10/04/17. Chart reviewed.     Interval History and Content of Current Session:  Current Medication Profile for Psych  · Abilify 5mg po q day (pt failed on 10 mg due to oversedation)  · Cymbalta 60mg po bid  · Ambien 10mg po q hs  · Klonopin 1 mg po daily PRN severe anxiety (increased last visit)  · Also taking Neurontin and Topamax from Neurology    Pt reports that Klonopin was effective in helping her relax before attending wedding and she was able to enjoy the event without emotional outbursts.  Pt was conflicted abut attending wedding due to her dislike of daughter's fiancee and knowing that outside family members were aware of her history of conflict with him.  We increased Klonopin during last visit and challenged cognitive distortions of Personalizing and Fortune Telling with some success.  We also planned for her to take Klonopin before event and tested 0.5 mg and then 1 mg to evaluate her response to medication.  Discussed need to continue individual psyhcotherapy with Dr. Monroe to build adaptive coping skills and CBT skills.  Offered therapeutic praise for pt complying with treatment.    Pt continues to endorse symptoms of insomnia.  Ambien helping to induce sleep but was up 2-3 hrs after falling asleep and is unable to resume sleep.  Pt inquired about longer-acting Ambien Cr.  Educated pt on interactions of Klonopin and Ambien and that these are short-term medications.  Pt acknowledged understanding. Educated pt on proper sleep hygiene.       Psychotherapy:  · Target symptoms: depression, anxiety   · Why chosen therapy is appropriate versus another modality: relevant to diagnosis  · Outcome monitoring methods: self-report,  "observation  · Therapeutic intervention type: insight oriented psychotherapy, CBT  · Topics discussed/themes: relationships difficulties, parenting issues, stress related to medical comorbidities, difficulty managing affect in interpersonal relationships, building skills sets for symptom management, symptom recognition  · The patient's response to the intervention is accepting. The patient's progress toward treatment goals is fair.   · Duration of intervention: 25 minutes.    Review of Systems   · PSYCHIATRIC: Pertinant items are noted in the narrative.  · CONSTITUTIONAL: No weight gain or loss.   · ENDOCRINE: No polydipsia or polyuria.  · INTEGUMENTARY: No rashes or lacerations.  · EYES: No exophthalmos, jaundice or blindness.  · ENT: No dizziness, tinnitus or hearing loss.  · RESPIRATORY: No shortness of breath.  · GASTROINTESTINAL: No nausea, vomiting, pain, constipation or diarrhea.  · GENITOURINARY: No frequency, dysuria or sexual dysfunction.  · HEMATOLOGIC/LYMPHATIC: No excessive bleeding, prolonged or excessive bleeding after dental extraction/injury.  · ALLERGIC/IMMUNOLOGIC: No allergic response to materials, foods or animals at this time.    Past Medical, Family and Social History: The patient's past medical, family and social history have been reviewed and updated as appropriate within the electronic medical record - see encounter notes.    Compliance: yes    Side effects: None    Risk Parameters:  Patient reports no suicidal ideation  Patient reports no homicidal ideation  Patient reports no self-injurious behavior  Patient reports no violent behavior    Exam (detailed: at least 9 elements; comprehensive: all 15 elements)   Constitutional  Vitals:  Most recent vital signs, dated less than 90 days prior to this appointment, were reviewed.   Vitals:    01/03/18 1005   BP: (!) 162/78   Pulse: 83   Weight: 119.3 kg (263 lb)   Height: 5' 4" (1.626 m)        General:  unremarkable, age appropriate "     Musculoskeletal  Muscle Strength/Tone:  no dystonia, no tremor, no tic   Gait & Station:  non-ataxic     Psychiatric  Speech:  no latency; no press   Mood & Affect:  dysthymic, irritable  irritable   Thought Process:  normal and logical   Associations:  intact   Thought Content:  normal, no suicidality, no homicidality, delusions, or paranoia   Insight:  has awareness of illness   Judgement: behavior is adequate to circumstances, age appropriate   Orientation:  grossly intact, person, place, situation, time/date   Memory: intact for content of interview, able to remember recent events- yes, able to remember remote events- yes   Language: grossly intact   Attention Span & Concentration:  able to focus   Fund of Knowledge:  intact and appropriate to age and level of education, familiar with aspects of current personal life     Assessment and Diagnosis   Status/Progress: Based on the examination today, the patient's problem(s) is/are adequately but not ideally controlled.  New problems have not been presented today.   Co-morbidities and Lack of compliance are not complicating management of the primary condition.  There are no active rule-out diagnoses for this patient at this time.     General Impression:       ICD-10-CM ICD-9-CM   1. Depression, major, recurrent, moderate F33.1 296.32   2. Insomnia, unspecified type G47.00 780.52   3. Anxiety F41.9 300.00       Intervention/Counseling/Treatment Plan   · Medication Management: The risks and benefits of medication were discussed with the patient.   · Continue Cymbalta 60 mg po BID  · Continue Abilify 5 mg po daily  · Switch to  Ambien CR 12.5 mg po q hs PRN insomnia  · continue Klonopin 1 mg po daily PRN severe anxiety  · Continue individual therapy with Dr. Rosales      Return to Clinic: 3 months

## 2018-01-04 DIAGNOSIS — G47.8 UPPER AIRWAY RESISTANCE SYNDROME: ICD-10-CM

## 2018-01-04 DIAGNOSIS — G43.711 CHRONIC MIGRAINE WITHOUT AURA, WITH INTRACTABLE MIGRAINE, SO STATED, WITH STATUS MIGRAINOSUS: ICD-10-CM

## 2018-01-04 RX ORDER — ONDANSETRON 4 MG/1
4 TABLET, ORALLY DISINTEGRATING ORAL
Qty: 8 TABLET | Refills: 0 | Status: SHIPPED | OUTPATIENT
Start: 2018-01-04 | End: 2018-04-16 | Stop reason: SDUPTHER

## 2018-01-04 NOTE — TELEPHONE ENCOUNTER
Spoke to patient and asked if she was completely out of her medication and she said yes. Informed her that Dr. Rock is out of the office until Tuesday and that I will forward her request over to the provider that can assist. Patient also mentioned that she will drop off some paper work for him to fill out .

## 2018-01-06 DIAGNOSIS — G43.711 CHRONIC MIGRAINE WITHOUT AURA, WITH INTRACTABLE MIGRAINE, SO STATED, WITH STATUS MIGRAINOSUS: ICD-10-CM

## 2018-01-06 RX ORDER — TIAGABINE HYDROCHLORIDE 4 MG/1
TABLET, FILM COATED ORAL
Qty: 40 TABLET | Refills: 0 | OUTPATIENT
Start: 2018-01-06

## 2018-01-10 ENCOUNTER — TELEPHONE (OUTPATIENT)
Dept: ELECTROPHYSIOLOGY | Facility: CLINIC | Age: 53
End: 2018-01-10

## 2018-01-10 ENCOUNTER — DOCUMENTATION ONLY (OUTPATIENT)
Dept: ELECTROPHYSIOLOGY | Facility: CLINIC | Age: 53
End: 2018-01-10

## 2018-01-10 ENCOUNTER — ANTI-COAG VISIT (OUTPATIENT)
Dept: CARDIOLOGY | Facility: CLINIC | Age: 53
End: 2018-01-10
Payer: MEDICARE

## 2018-01-10 DIAGNOSIS — I48.0 PAROXYSMAL ATRIAL FIBRILLATION: ICD-10-CM

## 2018-01-10 DIAGNOSIS — Z79.01 LONG TERM (CURRENT) USE OF ANTICOAGULANTS: Primary | ICD-10-CM

## 2018-01-10 LAB — INR PPP: 2.4 (ref 2–3)

## 2018-01-10 PROCEDURE — 85610 PROTHROMBIN TIME: CPT | Mod: PBBFAC

## 2018-01-10 NOTE — PROGRESS NOTES
"Arrhythmia clinic  Alert received through home monitoring. Biv pacing 100% however there is a few episodes of interrupted "CRT pacing" Discussed with Nichelle with [facility] [employer].   Occurs during AT episodes, pt has PVC then there is RVpacing with LV sensing noted.   Nichelle will be present for upcoming appt which is 3mos follow up to help reprogram to optimize LV pacing.  "

## 2018-01-10 NOTE — PROGRESS NOTES
Quick follow-up from low INR 12/27. INR within normal range today. Patient denies any bruising, bleeding or changes. Will maintain weekly dose until follow-up in 3 weeks. Advised her to call with any changes or concerns.

## 2018-01-10 NOTE — PROGRESS NOTES
Pt seen by Katya MARTELL. I have reviewed her documentation and agree with her assessment and plan.

## 2018-01-10 NOTE — TELEPHONE ENCOUNTER
"RE: Alert for home monitoring, interruption in BiV pacing   Received: Today   Message Contents   MD Moni Cook, RN             thanks    Previous Messages      ----- Message -----   From: Moni Rollins RN   Sent: 1/10/2018   8:45 AM   To: Avelino Mejia MD, Blanca CHRISTINE Wallis   Subject: Alert for home monitoring, interruption in B*     Dr. Mejia,   Alert received through home monitoring. Biv pacing 100% however there is a few episodes of interrupted "CRT pacing" Occurs during AT episodes, pt has PVC then there is RVpacing with LV sensing noted.   Discussed with Nichelle with [employer].   Nichelle will be present for upcoming appt which is 3mos follow up to help reprogram to optimize LV pacing.     Thanks,   Moni"

## 2018-01-11 RX ORDER — TIAGABINE HYDROCHLORIDE 4 MG/1
4 TABLET, FILM COATED ORAL NIGHTLY
Status: CANCELLED | OUTPATIENT
Start: 2018-01-11

## 2018-01-17 DIAGNOSIS — G43.711 CHRONIC MIGRAINE WITHOUT AURA, WITH INTRACTABLE MIGRAINE, SO STATED, WITH STATUS MIGRAINOSUS: ICD-10-CM

## 2018-01-17 RX ORDER — TIAGABINE HYDROCHLORIDE 4 MG/1
TABLET, FILM COATED ORAL
Qty: 40 TABLET | Refills: 0 | OUTPATIENT
Start: 2018-01-17

## 2018-01-18 ENCOUNTER — OUTPATIENT CASE MANAGEMENT (OUTPATIENT)
Dept: ADMINISTRATIVE | Facility: OTHER | Age: 53
End: 2018-01-18

## 2018-01-18 NOTE — PROGRESS NOTES
Good morning. My name is Mone, I work for [facility]s Outpatient case management department. I wanted to call and review your disaster plan with you. I also wanted to go over some crucial information and provide you with emergency phone numbers for your [place].   [] Called patient, no answer, I left a message with the phone number for the [place] Office of Emergency Preparedness.   [x] Please be sure to have a supply of water, non-perishable food items, flashlights and batteries with you in your house.   [] Please be sure you bring all of your medications with you. Bring at least a five day supply. It is best to bring your medication bottles, in case you are displaced for a longer period of time, therefore you can get your medication refilled from your temporary location.   [] Please bring sure to bring any DME that you may need. This includes walkers, wheelchairs, shower chairs, nebulizer machine, etc.   [] If you are a diabetic- be sure to bring your glucometer and all glucometer monitoring supplies.   [] If you have high blood pressure- be sure to bring your blood pressure cuff so you can continue to monitor.   [] If you have CHF-be sure to bring your scale so you can continue to monitor.  [] If on PEG feeding- be sure to bring tube feedings and feeding supplies.  [] If on oxygen- be sure to bring all of your oxygen supplies: Cannula, portable tanks, concentrator, etc. Also contact you Oxygen Supply Company to find out the nearest location of an oxygen supply company to where you will be located. ([employer]: [phone], [facility]: [phone], [facility]:[phone], [employer]: [phone]).   [] If you receive hemodialysis- you should already have a plan in place with your dialysis center. If you do not know where you need to evacuate to in order to be close to a dialysis center- reach out to your dialysis center for further direction. ([facility] service line: [phone], [facility]  loree service line [phone])  [] If receiving treatment at an infusion center- please contact the infusion center to find out which infusion center they have a contract with. Also ask your infusion center for location of the infusion center they are in contract with, so if need be you can evacuate to that area.   Office of Emergency Preparedness Phone number:  [] [facility]: [phone]  [x] [facility]: [phone]  [] [place]: [phone]  [] [place]: [phone]  [] [place]: [phone]  [] [facility]: [phone]  [] [facility]: [phone]  [] [facility]: [phone]  [] [facility]: [phone]  [] [facility]: [phone]  [] [facility]: [phone]  [] [place]: [phone]  [] [facility]: [phone]    [] [facility]:  [phone]   [] [facility]:  [phone]   [] [facility]:  [phone]   [] [facility]:  [phone]   [] [facility]:  [phone]   [] [facility]: [phone]   [] [facility]:  [phone]   [] [facility]:  [phone]   [] [facility]:  [phone]   [] [facility]:  [phone]   [] [facility]:  [phone]   [] [facility]:  [phone]   [] [facility]:  [phone]   [] [facility]:  [phone]   [] [facility]:  [phone]   [] [facility]: [phone]   [] [facility]:  [phone]   [] [facility] [place]: [phone]   [] [place]: [phone]

## 2018-01-22 ENCOUNTER — TELEPHONE (OUTPATIENT)
Dept: NEUROLOGY | Facility: CLINIC | Age: 53
End: 2018-01-22

## 2018-01-22 ENCOUNTER — PATIENT MESSAGE (OUTPATIENT)
Dept: NEUROLOGY | Facility: CLINIC | Age: 53
End: 2018-01-22

## 2018-01-22 NOTE — TELEPHONE ENCOUNTER
----- Message from Mariama Mehta sent at 1/22/2018  9:54 AM CST -----  Contact: pt [phone]  Pt states a blood vessel has popped in her left eye and she would like to know if she should schedule an appointment with Dr Cortez or Dr Paredes for this condition. Please call pt to assist.

## 2018-01-23 ENCOUNTER — OFFICE VISIT (OUTPATIENT)
Dept: PSYCHIATRY | Facility: CLINIC | Age: 53
End: 2018-01-23
Payer: MEDICARE

## 2018-01-23 DIAGNOSIS — F33.1 DEPRESSION, MAJOR, RECURRENT, MODERATE: Primary | ICD-10-CM

## 2018-01-23 PROCEDURE — 90834 PSYTX W PT 45 MINUTES: CPT | Mod: PBBFAC | Performed by: SOCIAL WORKER

## 2018-01-23 PROCEDURE — 90834 PSYTX W PT 45 MINUTES: CPT | Mod: S$PBB,,, | Performed by: SOCIAL WORKER

## 2018-01-24 NOTE — PROGRESS NOTES
Individual Psychotherapy (PhD/LCSW)    1/23/2018    Site:  [facility]         Therapeutic Intervention: Met with patient.  Outpatient - Insight oriented psychotherapy 45 min - CPT code 50912    Chief complaint/reason for encounter: depression     Interval history and content of current session: Pt was last seen by me 6 months ago.  She returns at the urging of her NP who she recently saw.  She remains very depressed about herself and her difficulty remembering things and the limitations she has cognitively due to her stroke.  She and her daughter are now living with an ex-boyfriend of hers.  She feels OK about that but would like to have her own place and be able to work again.  She has difficulty accepting the effects the stroke has had on her.  She has a good support system with her sister and older daughter which she recognizes.    Treatment plan:  · Target symptoms: depression  · Why chosen therapy is appropriate versus another modality: relevant to diagnosis  · Outcome monitoring methods: self-report, observation  · Therapeutic intervention type: insight oriented psychotherapy, supportive psychotherapy    Risk parameters:  Patient reports no suicidal ideation  Patient reports no homicidal ideation  Patient reports no self-injurious behavior  Patient reports no violent behavior    Verbal deficits: None    Patient's response to intervention:  The patient's response to intervention is motivated.    Progress toward goals and other mental status changes:  The patient's progress toward goals is fair .    Diagnosis:     ICD-10-CM ICD-9-CM   1. Depression, major, recurrent, moderate F33.1 296.32       Plan:  individual psychotherapy, group psychotherapy and medication management by physician    Return to clinic: 1 month

## 2018-01-26 ENCOUNTER — OUTPATIENT CASE MANAGEMENT (OUTPATIENT)
Dept: ADMINISTRATIVE | Facility: OTHER | Age: 53
End: 2018-01-26

## 2018-01-26 NOTE — PROGRESS NOTES
-- 2nd attempt to follow up for OPCM, left message requesting a return call. As this is my second unsuccessful attempt to follow up for OPCM, I will mail a letter with my contact information. Gala WALLACE-OPCM        Follow up plan:     Continue to educate about risk of infection. Review signs and symptoms of infection. Encourage patient to follow medication and treatment regimen. Encourage patient to maintain follow up with doctors.

## 2018-01-26 NOTE — LETTER
January 26, 2018    Amnasa BELLE Chawla  [address]  [place] [address]             [facility]  [address]  [place] [address] Dear Ms. Chawla,    I work with [facility] Case Management Department. I have been unsuccessful at reaching you to follow-up to see how you have been doing. Please call me back at your earliest convenience to discuss your health care needs.      I can be reached at [phone] from 8:00AM to 4:30 PM on Monday thru Friday. [facility] also has a program where a nurse is available 24/7 to answer questions or provide medical advice, their number is [phone].    Thanks,      Mone Hawthorne RN  Outpatient Case Management

## 2018-01-30 ENCOUNTER — CLINICAL SUPPORT (OUTPATIENT)
Dept: ELECTROPHYSIOLOGY | Facility: CLINIC | Age: 53
End: 2018-01-30
Attending: INTERNAL MEDICINE
Payer: MEDICARE

## 2018-01-30 ENCOUNTER — ANTI-COAG VISIT (OUTPATIENT)
Dept: CARDIOLOGY | Facility: CLINIC | Age: 53
End: 2018-01-30
Payer: MEDICARE

## 2018-01-30 ENCOUNTER — HOSPITAL ENCOUNTER (OUTPATIENT)
Dept: CARDIOLOGY | Facility: CLINIC | Age: 53
Discharge: HOME | End: 2018-01-30
Payer: MEDICARE

## 2018-01-30 ENCOUNTER — OFFICE VISIT (OUTPATIENT)
Dept: ELECTROPHYSIOLOGY | Facility: CLINIC | Age: 53
End: 2018-01-30
Payer: MEDICARE

## 2018-01-30 VITALS
DIASTOLIC BLOOD PRESSURE: 78 MMHG | SYSTOLIC BLOOD PRESSURE: 122 MMHG | HEART RATE: 63 BPM | WEIGHT: 268.94 LBS | BODY MASS INDEX: 45.91 KG/M2 | HEIGHT: 64 IN

## 2018-01-30 DIAGNOSIS — I44.2 COMPLETE AV BLOCK: Primary | ICD-10-CM

## 2018-01-30 DIAGNOSIS — I48.0 PAROXYSMAL ATRIAL FIBRILLATION: ICD-10-CM

## 2018-01-30 DIAGNOSIS — Z95.810 IMPLANTABLE CARDIOVERTER-DEFIBRILLATOR (ICD) IN SITU: ICD-10-CM

## 2018-01-30 DIAGNOSIS — Z95.810 BIVENTRICULAR AUTOMATIC IMPLANTABLE CARDIOVERTER DEFIBRILLATOR IN SITU: ICD-10-CM

## 2018-01-30 DIAGNOSIS — Z79.01 LONG TERM (CURRENT) USE OF ANTICOAGULANTS: ICD-10-CM

## 2018-01-30 DIAGNOSIS — Z86.74 HISTORY OF SUDDEN CARDIAC ARREST: ICD-10-CM

## 2018-01-30 DIAGNOSIS — R00.2 PALPITATIONS: ICD-10-CM

## 2018-01-30 DIAGNOSIS — Z95.0 PACEMAKER: ICD-10-CM

## 2018-01-30 DIAGNOSIS — I42.8 NONISCHEMIC CARDIOMYOPATHY: ICD-10-CM

## 2018-01-30 DIAGNOSIS — I45.81 PROLONGED QT SYNDROME: ICD-10-CM

## 2018-01-30 DIAGNOSIS — Z79.01 LONG TERM (CURRENT) USE OF ANTICOAGULANTS: Primary | ICD-10-CM

## 2018-01-30 DIAGNOSIS — E66.01 OBESITY, CLASS III, BMI 40-49.9 (MORBID OBESITY): ICD-10-CM

## 2018-01-30 LAB — INR PPP: 2.3 (ref 2–3)

## 2018-01-30 PROCEDURE — 93005 ELECTROCARDIOGRAM TRACING: CPT | Mod: PBBFAC | Performed by: INTERNAL MEDICINE

## 2018-01-30 PROCEDURE — 99214 OFFICE O/P EST MOD 30 MIN: CPT | Mod: S$PBB,,, | Performed by: INTERNAL MEDICINE

## 2018-01-30 PROCEDURE — 93284 PRGRMG EVAL IMPLANTABLE DFB: CPT | Mod: PBBFAC | Performed by: INTERNAL MEDICINE

## 2018-01-30 PROCEDURE — 99999 PR PBB SHADOW E&M-EST. PATIENT-LVL IV: CPT | Mod: PBBFAC,,, | Performed by: INTERNAL MEDICINE

## 2018-01-30 PROCEDURE — 85610 PROTHROMBIN TIME: CPT | Mod: PBBFAC

## 2018-01-30 PROCEDURE — 93010 ELECTROCARDIOGRAM REPORT: CPT | Mod: S$PBB,,, | Performed by: INTERNAL MEDICINE

## 2018-01-30 PROCEDURE — 99211 OFF/OP EST MAY X REQ PHY/QHP: CPT | Mod: S$PBB,,,

## 2018-01-30 PROCEDURE — 99214 OFFICE O/P EST MOD 30 MIN: CPT | Mod: PBBFAC,25 | Performed by: INTERNAL MEDICINE

## 2018-01-30 RX ORDER — DESIPRAMINE HYDROCHLORIDE 25 MG/1
25 TABLET ORAL DAILY
COMMUNITY
End: 2018-04-16 | Stop reason: ALTCHOICE

## 2018-01-30 NOTE — PROGRESS NOTES
INR within normal range today. Patient with bruise on arm from use. Denies any bleeding or other changes. Patient is stable on this dose. She will continue this dose until follow-up in 4 weeks. I advised her to contact us with any changes or problems.

## 2018-01-30 NOTE — PROGRESS NOTES
Subjective:    Patient ID:  Amna Chawla is a 52 y.o. female who presents for follow-up of Cardiomyopathy and Pacemaker Check      HPI   Prior Hx:  I had the pleasure of seeing Amna Chawla in follow-up today for her history of cardiac arrest, heart block, and ICD implantation. As you are aware, she is a 52-year old female, former patient of Dr. Humberto Martins and patient of mine I cared for when she initially presented in cardiac arrest as well as followed in my general cardiology fellow clinic, who was admitted to [facility] in 2/2013 after having a cardiac arrest.  She was working as a school  and collapsed at work.  On EMS arrival it was described that she was pulseless and given epinephrine (? Initially PEA) however after epinephrine noted to be in VF and was resuscitated with defibrillation/ACLS.  She underwent hypothermia protocol. Her post-arrest course was notable or intermittent 3rd-degree AV block. On 2/15/2013, a dual chamber ICD was implanted. Her EF prior to discharge was 60%. She had a negative coronary CTA during that admission.  In subsequent follow-up she underwent a pharmacologic stress ECHO in 2014 which showed a preserved LVEF and no ischemia.     Subsequent ICD interrogations show no VT, 100% RV pacing.  She was also diagnosed with symptomatic paroxysmal AF and was started on anticoagulation. She preferred coumadin.  She noted new onset dyspnea on exertion 9/2017. We performed an echocardiogram and her EF was 40-45% in setting of chronic RV pacing. Recent PET stress showed no ischemia/infarct. We proceeded with upgrade to CRT-D which was performed on 9/27/2017.     Interim Hx:  Ms. Chawla presents for 3 month post CRT-D upgrade visit. She notes more energy and improvement in the shortness of breath. Lead parameters are normal. She has 100% BiV pacing. AF/AT burden was 6%. Longest 11 hours.  She notes very rare diaphragm stimulation.    I reviewed today's ECG tracing, which shows  A-sensed BiV-paced rhythm at 63 bpm and a QRS duration of 144 ms.    Review of Systems   Constitution: Negative for fever, weakness and malaise/fatigue.   HENT: Negative for congestion and sore throat.    Eyes: Negative for blurred vision and visual disturbance.   Cardiovascular: Negative for chest pain, dyspnea on exertion, irregular heartbeat, leg swelling, near-syncope, orthopnea, palpitations, paroxysmal nocturnal dyspnea and syncope.   Respiratory: Negative for cough and wheezing.    Hematologic/Lymphatic: Negative for bleeding problem. Does not bruise/bleed easily.   Skin: Negative.  Negative for rash.   Musculoskeletal: Negative.    Gastrointestinal: Negative for hematochezia and melena.   Neurological: Positive for headaches. Negative for dizziness and focal weakness.        Objective:    Physical Exam   Constitutional: She is oriented to person, place, and time. She appears well-developed and well-nourished. No distress.   HENT:   Head: Normocephalic and atraumatic.   Eyes: Conjunctivae are normal. Right eye exhibits no discharge.   Neck: Neck supple. No JVD present.   Cardiovascular: Normal rate, regular rhythm and normal heart sounds.  Exam reveals no gallop and no friction rub.    No murmur heard.  Pulmonary/Chest: Effort normal and breath sounds normal. No respiratory distress. She has no wheezes. She has no rales.   ICD incision well healed   Abdominal: Soft. Bowel sounds are normal. She exhibits no distension. There is no tenderness.   Musculoskeletal: She exhibits no edema.   Neurological: She is alert and oriented to person, place, and time.   Skin: Skin is warm and dry. She is not diaphoretic.   Psychiatric: She has a normal mood and affect. Her behavior is normal. Judgment and thought content normal.   Vitals reviewed.        Assessment:       1. Complete AV block    2. Nonischemic cardiomyopathy from RV pacing    3. History of sudden cardiac arrest    4. Biventricular automatic implantable  cardioverter defibrillator in situ    5. Paroxysmal atrial fibrillation    6. Long term (current) use of anticoagulants    7. Obesity, Class III, BMI 40-49.9 (morbid obesity)         Plan:       In summary, Mrs. Chawla is a pleasant 53 yo cardiac arrest survivor with VF noted during arrest, no ischemic heart disease with preserved LVEF, 3rd degree AV block, newly discovered symptomatic LVEF of 40% in setting of normal PET stress and chronic RV pacing who presents for CRT-D follow-up. Doing well. Continue remote checks every 3 months. Continue anticoagulation. If patient begins to note symptomatic issues with AF then will consider anti-arrhythmic therapy. Repeat ECHO in 3 months. Discussed patient's weight and exercise. She notes she is starting to go to [facility] with a .    RTC in 6 months. Will call with ECHO results in 3 months.    Thank you for allowing me to participate in the care of this patient. Please do not hesitate to call me with any questions or concerns.    Avelino Mejia MD, PhD  Cardiac Electrophysiology

## 2018-02-14 ENCOUNTER — LAB VISIT (OUTPATIENT)
Dept: LAB | Facility: HOSPITAL | Age: 53
End: 2018-02-14
Attending: NURSE PRACTITIONER
Payer: MEDICARE

## 2018-02-14 ENCOUNTER — OUTPATIENT CASE MANAGEMENT (OUTPATIENT)
Dept: ADMINISTRATIVE | Facility: OTHER | Age: 53
End: 2018-02-14

## 2018-02-14 DIAGNOSIS — I10 ESSENTIAL HYPERTENSION: ICD-10-CM

## 2018-02-14 DIAGNOSIS — E78.00 PURE HYPERCHOLESTEROLEMIA: ICD-10-CM

## 2018-02-14 LAB
ALBUMIN SERPL BCP-MCNC: 2.7 G/DL
ALP SERPL-CCNC: 60 U/L
ALT SERPL W/O P-5'-P-CCNC: 33 U/L
ANION GAP SERPL CALC-SCNC: 5 MMOL/L
AST SERPL-CCNC: 21 U/L
BILIRUB SERPL-MCNC: 0.3 MG/DL
BUN SERPL-MCNC: 13 MG/DL
CALCIUM SERPL-MCNC: 8.6 MG/DL
CHLORIDE SERPL-SCNC: 109 MMOL/L
CHOLEST SERPL-MCNC: 145 MG/DL
CHOLEST/HDLC SERPL: 3.9 {RATIO}
CO2 SERPL-SCNC: 24 MMOL/L
CREAT SERPL-MCNC: 0.8 MG/DL
EST. GFR  (AFRICAN AMERICAN): >60 ML/MIN/1.73 M^2
EST. GFR  (NON AFRICAN AMERICAN): >60 ML/MIN/1.73 M^2
GLUCOSE SERPL-MCNC: 99 MG/DL
HDLC SERPL-MCNC: 37 MG/DL
HDLC SERPL: 25.5 %
LDLC SERPL CALC-MCNC: 93 MG/DL
NONHDLC SERPL-MCNC: 108 MG/DL
POTASSIUM SERPL-SCNC: 4 MMOL/L
PROT SERPL-MCNC: 8.4 G/DL
SODIUM SERPL-SCNC: 138 MMOL/L
TRIGL SERPL-MCNC: 75 MG/DL

## 2018-02-14 PROCEDURE — 36415 COLL VENOUS BLD VENIPUNCTURE: CPT

## 2018-02-14 PROCEDURE — 80061 LIPID PANEL: CPT

## 2018-02-14 PROCEDURE — 80053 COMPREHEN METABOLIC PANEL: CPT

## 2018-02-14 NOTE — PROGRESS NOTES
Chart reviewed. Spoke with pt who reports she has been taking her BP and it has been within normal range. Discussed with pt the importance of monitoring and logging her BP daily. Discussed signs and symptoms of hypertension and hypotension. Pt reports her defibrillator site has completely healed. Discussed pts upcoming appointments including her ECHO. Pt reports her shortness of breath has gotten better and she has more energy. Will follow up. JERRI Hawthorne RN-OPCM     Follow up plan:     Continue to educate about risk of infection. Review signs and symptoms of infection. Encourage patient to follow medication and treatment regimen. Encourage patient to maintain follow up with doctors.

## 2018-02-21 PROBLEM — I42.9 CARDIOMYOPATHY: Status: RESOLVED | Noted: 2017-10-13 | Resolved: 2018-02-21

## 2018-02-21 PROBLEM — Z86.718 HISTORY OF DVT (DEEP VEIN THROMBOSIS): Status: ACTIVE | Noted: 2018-02-21

## 2018-02-21 NOTE — PROGRESS NOTES
"Ms. Chawla is a patient of Dr. Martins and was last seen in [facility] 11/14/2017.      Subjective:   Patient ID:  Amna Chawla is a 52 y.o. female who presents for follow-up of Hypertension (3 month f/u ) and Chest Pain (heaviness)  .   HPI:    Ms. Chawla is a 51yo female with HTN, HLD, cardiomyopathy (EF was 20% in 2013, up to 40-45%, repeat echo today), history of cardiac arrest (2/7/2013 due to electrolyte derangement), Bi-V ICD (upgrade 10/13/2017), atrial fibrillation (diagnosed 6/2017, on coumadin), history of stroke (concurrent with cardiac arrest on 2/7/2013) here for follow up. Yesterday she says she felt a chest "fullness" that made her feel like she needed to belch. Episode lasted about 1 hour and resolved with rest. She says she has experienced this once before while at rest. She is seeing a nutritionist and planning on an exercise program at [facility]. She has chronic headache and is treated with botox.She has noticed that she is tripping and falling more often and plans to bring this up with her neurologist. She denies LOC or light-headedness. Otherwise, Ms. Chawla denies chest pain with exertion, palpitations, SOB, MERCHANT, dizziness, syncope, leg edema, claudication, PND, or orthopnea.     Last ICD programming on 1/30/2018 showed 27% RA pacing, 100% RV pacing, mode switch: yes 6% max 11 hrs on Coumadin, Nonsustained VT: No. She is followed by Dr. Mejia in EP.    She is currently anticoagulated on coumadin. She denies bleeding episodes. She is also taking atorvastatin 40mg (She has an allergy to ASA). LDL 93 on 2/14/2018. She is also taking carvedilol 25mg BID, chlorthalidone 25mg, and valsartan 80mg. BPs are controlled. Creatinine is 0.8. K is 4. Hepatic transaminases are within normal limits. HA1C is 6.  Weight is up 4lbs since last clinic visit. She has an echocardiogram scheduled for today.     Recent Cardiac Tests:    2D Echo (9/13/2017):  CONCLUSIONS     1 - Mildly to moderately " depressed left ventricular systolic function (EF 40-45%).     2 - Impaired LV relaxation, normal LAP (grade 1 diastolic dysfunction).     3 - Normal right ventricular systolic function .     4 - The estimated PA systolic pressure is greater than 26 mmHg.     5 - Trivial to mild mitral regurgitation.     6 - Trivial to mild tricuspid regurgitation.     7 - Severe left atrial enlargement.     8 - No wall motion abnormalities.       Current Outpatient Prescriptions   Medication Sig    ARIPiprazole (ABILIFY) 5 MG Tab Take 1 tablet (5 mg total) by mouth every evening.    atorvastatin (LIPITOR) 40 MG tablet Take 1 tablet (40 mg total) by mouth every morning.    carvedilol (COREG) 25 MG tablet TAKE 1 TABLET(25 MG) BY MOUTH TWICE DAILY WITH MEALS    chlorthalidone (HYGROTEN) 25 MG Tab Take 1 tablet (25 mg total) by mouth once daily.    clonazePAM (KLONOPIN) 1 MG tablet Take 1 tablet (1 mg total) by mouth daily as needed for Anxiety.    desipramine (NORPRAMIN) 25 MG tablet Take 25 mg by mouth once daily.    donepezil (ARICEPT) 10 MG tablet Take 1 tablet (10 mg total) by mouth 2 (two) times daily.    DULoxetine (CYMBALTA) 60 MG capsule Take 1 capsule (60 mg total) by mouth 2 (two) times daily.    gabapentin (NEURONTIN) 300 MG capsule Take 3 capsules (900 mg total) by mouth 3 (three) times daily.    lorazepam (ATIVAN) 0.5 MG tablet Take 1 tablet (0.5 mg total) by mouth every 6 (six) hours as needed for Anxiety.    magnesium 200 mg Tab Take 200 mg by mouth once daily.    ondansetron (ZOFRAN-ODT) 4 MG TbDL Take 1 tablet (4 mg total) by mouth every 24 hours as needed (nausea).    oxyCODONE-acetaminophen (PERCOCET)  mg per tablet Take 1 tablet by mouth every 8 (eight) hours as needed for Pain.    pantoprazole (PROTONIX) 40 MG tablet Take 1 tablet (40 mg total) by mouth once daily.    tiagabine (GABITRIL) 4 MG tablet Take 4 mg by mouth every evening.    topiramate (TOPAMAX) 100 MG tablet Take 2 tablets (200 mg  "total) by mouth 2 (two) times daily.    valsartan (DIOVAN) 80 MG tablet Take 1 tablet (80 mg total) by mouth once daily.    VITAMIN D2 50,000 unit capsule TAKE 1 CAPSULE BY MOUTH EVERY 7 DAYS    warfarin (COUMADIN) 5 MG tablet TAKE 1 1/2 TABLET BY MOUTH ON ,  AND . TAKE 2 TABLETS BY MOUTH ON ALL OTHER DAYS    zolpidem (AMBIEN) 10 mg Tab      Review of Systems   Constitution: Negative for malaise/fatigue.   Eyes: Negative for blurred vision.   Cardiovascular: Negative for chest pain, claudication, dyspnea on exertion, irregular heartbeat, leg swelling, orthopnea, palpitations, paroxysmal nocturnal dyspnea and syncope.   Respiratory: Negative for shortness of breath.    Hematologic/Lymphatic: Negative for bleeding problem.   Skin: Negative for rash.   Musculoskeletal: Negative for myalgias.   Gastrointestinal: Negative for abdominal pain, constipation, diarrhea and nausea.   Genitourinary: Negative for hematuria.   Neurological: Positive for headaches and loss of balance. Negative for numbness.   Psychiatric/Behavioral: Negative for altered mental status.   Allergic/Immunologic: Negative for persistent infections.     Objective:     Right Arm BP - Sittin/64 (18 0837)  Left Arm BP - Sittin/55 (18 0837)    BP (!) 102/55 (BP Location: Left arm, Patient Position: Sitting, BP Method: Large (Automatic))   Pulse 77   Ht 5' 4" (1.626 m)   Wt 121.2 kg (267 lb 3.2 oz)   BMI 45.86 kg/m²     Physical Exam   Constitutional: She is oriented to person, place, and time. She appears well-developed and well-nourished.   HENT:   Head: Normocephalic.   Nose: Nose normal.   Eyes: Pupils are equal, round, and reactive to light.   Neck: No JVD present. Carotid bruit is not present.   Cardiovascular: Normal rate, regular rhythm, S1 normal and S2 normal.  Exam reveals no gallop.    No murmur heard.  Pulses:       Carotid pulses are 2+ on the right side, and 2+ on the left side.       " Radial pulses are 2+ on the right side, and 2+ on the left side.        Dorsalis pedis pulses are 2+ on the right side, and 2+ on the left side.   Pulmonary/Chest: Breath sounds normal. No respiratory distress.   Abdominal: Soft. Bowel sounds are normal. She exhibits no distension. There is no tenderness.   Musculoskeletal: Normal range of motion. She exhibits no edema.   Neurological: She is alert and oriented to person, place, and time.   Skin: Skin is warm and dry. No erythema.   Psychiatric: She has a normal mood and affect. Her speech is normal and behavior is normal.   Nursing note and vitals reviewed.    Lab Results   Component Value Date     02/14/2018    K 4.0 02/14/2018    MG 1.9 09/12/2017     02/14/2018    CO2 24 02/14/2018    BUN 13 02/14/2018    CREATININE 0.8 02/14/2018    GLU 99 02/14/2018    HGBA1C 6.0 02/02/2014    AST 21 02/14/2018    ALT 33 02/14/2018    ALBUMIN 2.7 (L) 02/14/2018    PROT 8.4 02/14/2018    BILITOT 0.3 02/14/2018    WBC 2.46 (L) 10/10/2017    HGB 12.4 10/10/2017    HCT 36.6 (L) 10/10/2017    MCV 86 10/10/2017     10/10/2017    TSH 1.407 03/13/2017    CHOL 145 02/14/2018    HDL 37 (L) 02/14/2018    LDLCALC 93.0 02/14/2018    TRIG 75 02/14/2018         Recent Labs  Lab 12/15/17  0949 12/27/17  0946 01/10/18  0951 01/30/18  0810   INR 2.8 1.8 2.4 2.3        Test(s) Reviewed  I have reviewed the following in detail:  [] Stress test   [] Angiography   [] Echocardiogram   [x] Labs   [] Other:         Assessment:         1. Paroxysmal atrial fibrillation. On coumadin and rate controlled on carvedilol. 6% AF burden on ICD. Managed by Dr. Mejia. Possible that chest fullness was AF or GI related. No chest pain with exertion. Negative PET stress 3/2017.      2. Nonischemic cardiomyopathy. EF 40-45%. On BB and ARB. No signs of heart failure. Repeat echo today.     3. Pure hypercholesterolemia. LDL 93 on atorvastatin 40mg.      4. Essential hypertension. BPs low. Will  decrease chlorthalidone to 12.5. Also calibrated home BP monitor.     5. History of DVT (deep vein thrombosis). On coumadin.     6. Obesity, Class III, BMI 40-49.9 (morbid obesity). She is seeing nutritionist and fitness center.     7. History of sudden cardiac arrest. ICD in place.     8. History of CVA (cerebrovascular accident). On coumadin. ASA allergic.      9. Pacemaker. Bi-V in place. Last ICD programming on 1/30/2018 showed 27% RA pacing, 100% RV pacing, mode switch: yes 6% max 11 hrs on Coumadin, Nonsustained VT: No. She is followed by Dr. Mejia in EP.     10. Complete AV block. S/P PPM.      Plan:     Amna was seen today for hypertension and chest pain.    Diagnoses and all orders for this visit:    Paroxysmal atrial fibrillation    Nonischemic cardiomyopathy from RV pacing  -     Basic metabolic panel; Future; Expected date: 08/24/2018    Pure hypercholesterolemia    Essential hypertension  -     chlorthalidone (HYGROTEN) 25 MG Tab; Take 12.5 (1/2 tablet) once daily.    History of DVT (deep vein thrombosis)    Obesity, Class III, BMI 40-49.9 (morbid obesity)    History of sudden cardiac arrest    History of CVA (cerebrovascular accident)    Pacemaker    Complete AV block    Echocardiogram today.  Setting for home blood pressure monitor -> 150mmHg  Can take chlorthalidone 12.5mg (1/2 tablet) daily.  Continue other medications.  Follow up in 6 months.       Follow-up in about 6 months (around 8/22/2018).    ------------------------------------------------------------------    SAE Roe, NP-C  Consult Cardiology

## 2018-02-22 ENCOUNTER — HOSPITAL ENCOUNTER (OUTPATIENT)
Dept: CARDIOLOGY | Facility: CLINIC | Age: 53
Discharge: HOME | End: 2018-02-22
Attending: INTERNAL MEDICINE
Payer: MEDICARE

## 2018-02-22 ENCOUNTER — TELEPHONE (OUTPATIENT)
Dept: ELECTROPHYSIOLOGY | Facility: CLINIC | Age: 53
End: 2018-02-22

## 2018-02-22 ENCOUNTER — OFFICE VISIT (OUTPATIENT)
Dept: CARDIOLOGY | Facility: CLINIC | Age: 53
End: 2018-02-22
Payer: MEDICARE

## 2018-02-22 VITALS
HEIGHT: 64 IN | HEART RATE: 77 BPM | DIASTOLIC BLOOD PRESSURE: 55 MMHG | WEIGHT: 267.19 LBS | SYSTOLIC BLOOD PRESSURE: 102 MMHG | BODY MASS INDEX: 45.62 KG/M2

## 2018-02-22 DIAGNOSIS — Z86.74 HISTORY OF SUDDEN CARDIAC ARREST: ICD-10-CM

## 2018-02-22 DIAGNOSIS — I10 ESSENTIAL HYPERTENSION: ICD-10-CM

## 2018-02-22 DIAGNOSIS — Z86.718 HISTORY OF DVT (DEEP VEIN THROMBOSIS): ICD-10-CM

## 2018-02-22 DIAGNOSIS — Z95.0 PACEMAKER: ICD-10-CM

## 2018-02-22 DIAGNOSIS — I48.0 PAROXYSMAL ATRIAL FIBRILLATION: Primary | ICD-10-CM

## 2018-02-22 DIAGNOSIS — E66.01 OBESITY, CLASS III, BMI 40-49.9 (MORBID OBESITY): ICD-10-CM

## 2018-02-22 DIAGNOSIS — I42.8 NONISCHEMIC CARDIOMYOPATHY: ICD-10-CM

## 2018-02-22 DIAGNOSIS — E78.00 PURE HYPERCHOLESTEROLEMIA: ICD-10-CM

## 2018-02-22 DIAGNOSIS — I44.2 COMPLETE AV BLOCK: ICD-10-CM

## 2018-02-22 DIAGNOSIS — Z86.73 HISTORY OF CVA (CEREBROVASCULAR ACCIDENT): ICD-10-CM

## 2018-02-22 LAB — RETIRED EF AND QEF - SEE NOTES: 45 (ref 55–65)

## 2018-02-22 PROCEDURE — 99213 OFFICE O/P EST LOW 20 MIN: CPT | Mod: PBBFAC,25 | Performed by: NURSE PRACTITIONER

## 2018-02-22 PROCEDURE — 99214 OFFICE O/P EST MOD 30 MIN: CPT | Mod: S$PBB,,, | Performed by: NURSE PRACTITIONER

## 2018-02-22 PROCEDURE — 99999 PR PBB SHADOW E&M-EST. PATIENT-LVL III: CPT | Mod: PBBFAC,,, | Performed by: NURSE PRACTITIONER

## 2018-02-22 PROCEDURE — 93307 TTE W/O DOPPLER COMPLETE: CPT | Mod: PBBFAC | Performed by: INTERNAL MEDICINE

## 2018-02-22 RX ORDER — CHLORTHALIDONE 25 MG/1
TABLET ORAL
Qty: 90 TABLET | Refills: 11 | Status: ON HOLD | OUTPATIENT
Start: 2018-02-22 | End: 2018-04-12 | Stop reason: HOSPADM

## 2018-02-22 RX ORDER — ZOLPIDEM TARTRATE 10 MG/1
TABLET ORAL
COMMUNITY
Start: 2018-02-05 | End: 2018-03-07 | Stop reason: SDUPTHER

## 2018-02-22 NOTE — TELEPHONE ENCOUNTER
----- Message from Avelino Mejia MD sent at 2/22/2018 12:30 PM CST -----  Please notify the patient that her ECHO showed some improvement in her heart function with the upgrade of her device. It is not completely normal but better. The important thing is it is improving and she feels better.

## 2018-02-22 NOTE — PATIENT INSTRUCTIONS
Echocardiogram today.  Setting for home blood pressure monitor -> 150mmHg  Can take chlorthalidone 12.5mg (1/2 tablet) daily.  Continue other medications.  Follow up in 6 months.

## 2018-02-22 NOTE — PROGRESS NOTES
Please notify the patient that her ECHO showed some improvement in her heart function with the upgrade of her device. It is not completely normal but better. The important thing is it is improving and she feels better.

## 2018-02-27 ENCOUNTER — ANTI-COAG VISIT (OUTPATIENT)
Dept: CARDIOLOGY | Facility: CLINIC | Age: 53
End: 2018-02-27
Payer: MEDICARE

## 2018-02-27 DIAGNOSIS — Z79.01 LONG TERM (CURRENT) USE OF ANTICOAGULANTS: Primary | ICD-10-CM

## 2018-02-27 DIAGNOSIS — I48.0 PAROXYSMAL ATRIAL FIBRILLATION: ICD-10-CM

## 2018-02-27 LAB — INR PPP: 1.3 (ref 2–3)

## 2018-02-27 PROCEDURE — 85610 PROTHROMBIN TIME: CPT | Mod: PBBFAC

## 2018-02-27 PROCEDURE — 99211 OFF/OP EST MAY X REQ PHY/QHP: CPT | Mod: S$PBB,25,,

## 2018-02-27 NOTE — PROGRESS NOTES
INR low today. Patient states that she is exercising with a  and managing her diet with a nutritionist, so she is eating better and more active, she plans on continuing these changes. She states she has bruises from use, denies any bleeding or other changes. Will boost dose today and then resume weekly dose until follow-up next week. Advised her to call with any changes or concerns.

## 2018-03-06 ENCOUNTER — PATIENT MESSAGE (OUTPATIENT)
Dept: NEUROLOGY | Facility: CLINIC | Age: 53
End: 2018-03-06

## 2018-03-06 ENCOUNTER — PATIENT MESSAGE (OUTPATIENT)
Dept: PSYCHIATRY | Facility: CLINIC | Age: 53
End: 2018-03-06

## 2018-03-07 ENCOUNTER — PATIENT MESSAGE (OUTPATIENT)
Dept: PSYCHIATRY | Facility: CLINIC | Age: 53
End: 2018-03-07

## 2018-03-07 ENCOUNTER — PROCEDURE VISIT (OUTPATIENT)
Dept: NEUROLOGY | Facility: CLINIC | Age: 53
End: 2018-03-07
Payer: MEDICARE

## 2018-03-07 ENCOUNTER — OFFICE VISIT (OUTPATIENT)
Dept: OPTOMETRY | Facility: CLINIC | Age: 53
End: 2018-03-07
Payer: MEDICARE

## 2018-03-07 ENCOUNTER — ANTI-COAG VISIT (OUTPATIENT)
Dept: CARDIOLOGY | Facility: CLINIC | Age: 53
End: 2018-03-07
Payer: MEDICARE

## 2018-03-07 VITALS
BODY MASS INDEX: 47.42 KG/M2 | WEIGHT: 277.75 LBS | SYSTOLIC BLOOD PRESSURE: 147 MMHG | HEIGHT: 64 IN | DIASTOLIC BLOOD PRESSURE: 81 MMHG | HEART RATE: 79 BPM

## 2018-03-07 DIAGNOSIS — G50.0 SUPRAORBITAL NEURALGIA: ICD-10-CM

## 2018-03-07 DIAGNOSIS — Z86.73 HISTORY OF CVA (CEREBROVASCULAR ACCIDENT): ICD-10-CM

## 2018-03-07 DIAGNOSIS — H16.223 KERATOCONJUNCTIVITIS SICCA OF BOTH EYES NOT SPECIFIED AS SJOGREN'S: Primary | ICD-10-CM

## 2018-03-07 DIAGNOSIS — H52.13 MYOPIA OF BOTH EYES WITH REGULAR ASTIGMATISM AND PRESBYOPIA: ICD-10-CM

## 2018-03-07 DIAGNOSIS — R29.818 NEUROLOGICAL DEFICIT PRESENT: ICD-10-CM

## 2018-03-07 DIAGNOSIS — H52.4 MYOPIA OF BOTH EYES WITH REGULAR ASTIGMATISM AND PRESBYOPIA: ICD-10-CM

## 2018-03-07 DIAGNOSIS — M62.838 NECK MUSCLE SPASM: ICD-10-CM

## 2018-03-07 DIAGNOSIS — H52.223 MYOPIA OF BOTH EYES WITH REGULAR ASTIGMATISM AND PRESBYOPIA: ICD-10-CM

## 2018-03-07 DIAGNOSIS — I48.0 PAROXYSMAL ATRIAL FIBRILLATION: ICD-10-CM

## 2018-03-07 DIAGNOSIS — Z79.01 LONG TERM (CURRENT) USE OF ANTICOAGULANTS: Primary | ICD-10-CM

## 2018-03-07 DIAGNOSIS — M54.2 CERVICALGIA: ICD-10-CM

## 2018-03-07 DIAGNOSIS — R29.6 FREQUENT FALLS: ICD-10-CM

## 2018-03-07 DIAGNOSIS — G47.00 INSOMNIA, UNSPECIFIED TYPE: Primary | ICD-10-CM

## 2018-03-07 DIAGNOSIS — G43.711 CHRONIC MIGRAINE WITHOUT AURA, WITH INTRACTABLE MIGRAINE, SO STATED, WITH STATUS MIGRAINOSUS: Primary | ICD-10-CM

## 2018-03-07 LAB — INR PPP: 1.2 (ref 2–3)

## 2018-03-07 PROCEDURE — 99211 OFF/OP EST MAY X REQ PHY/QHP: CPT | Mod: S$PBB,25,, | Performed by: PHARMACIST

## 2018-03-07 PROCEDURE — 64615 CHEMODENERV MUSC MIGRAINE: CPT | Mod: PBBFAC | Performed by: PSYCHIATRY & NEUROLOGY

## 2018-03-07 PROCEDURE — 92014 COMPRE OPH EXAM EST PT 1/>: CPT | Mod: S$PBB,,, | Performed by: OPTOMETRIST

## 2018-03-07 PROCEDURE — 92015 DETERMINE REFRACTIVE STATE: CPT | Mod: ,,, | Performed by: OPTOMETRIST

## 2018-03-07 PROCEDURE — 99214 OFFICE O/P EST MOD 30 MIN: CPT | Mod: PBBFAC,25 | Performed by: OPTOMETRIST

## 2018-03-07 PROCEDURE — 85610 PROTHROMBIN TIME: CPT | Mod: PBBFAC

## 2018-03-07 PROCEDURE — 99999 PR PBB SHADOW E&M-EST. PATIENT-LVL IV: CPT | Mod: PBBFAC,,, | Performed by: OPTOMETRIST

## 2018-03-07 PROCEDURE — 64615 CHEMODENERV MUSC MIGRAINE: CPT | Mod: S$PBB,,, | Performed by: PSYCHIATRY & NEUROLOGY

## 2018-03-07 RX ORDER — ZOLPIDEM TARTRATE 10 MG/1
10 TABLET ORAL NIGHTLY PRN
Qty: 30 TABLET | Refills: 5 | Status: SHIPPED | OUTPATIENT
Start: 2018-03-07 | End: 2018-04-03 | Stop reason: SDUPTHER

## 2018-03-07 RX ORDER — MAGNESIUM 200 MG
200 TABLET ORAL DAILY
Qty: 30 EACH | Refills: 12 | COMMUNITY
Start: 2018-03-07

## 2018-03-07 RX ORDER — TIAGABINE HYDROCHLORIDE 4 MG/1
4 TABLET, FILM COATED ORAL NIGHTLY
Qty: 30 TABLET | Refills: 12 | Status: SHIPPED | OUTPATIENT
Start: 2018-03-07 | End: 2019-07-17 | Stop reason: SDUPTHER

## 2018-03-07 RX ORDER — OXYCODONE AND ACETAMINOPHEN 10; 325 MG/1; MG/1
1 TABLET ORAL EVERY 8 HOURS PRN
Qty: 30 TABLET | Refills: 0 | Status: SHIPPED | OUTPATIENT
Start: 2018-03-07 | End: 2018-04-16 | Stop reason: ALTCHOICE

## 2018-03-07 RX ADMIN — ONABOTULINUMTOXINA 200 UNITS: 100 INJECTION, POWDER, LYOPHILIZED, FOR SOLUTION INTRADERMAL; INTRAMUSCULAR at 10:03

## 2018-03-07 NOTE — PROGRESS NOTES
HPI     Ms. Amna Chawla is here today for annual comprehensive eye exam   Patient states blur that fluctuates, VA worse at distance   Dry eyes Dr. Whitfield. Not using drops recommended    Patient request refraction     (-)drops  (-)flashes  (-)floaters  (-)diplopia    Diabetic no  Hemoglobin A1C       Date                     Value               Ref Range             Status                02/02/2014               6.0                 4.5 - 6.2 %           Final                 02/08/2013               5.9                 4.0 - 6.2 %           Final            ----------    OCULAR HISTORY  Last Eye Exam 06/16/16 Dr. Whitfield   (-)eye surgery   (-)diagnosed or treated for any eye conditions or diseases none     FAMILY HISTORY  (+)Glaucoma paternal and maternal grandmothers       Last edited by Jamil Ross, OD on 3/7/2018  2:13 PM. (History)            Assessment /Plan     For exam results, see Encounter Report.    Keratoconjunctivitis sicca of both eyes not specified as Sjogren's  -reviewed tears impact on VA  -Systane Balance PRN  -Consider Restasis/Xiidra at future    Myopia of both eyes with regular astigmatism and presbyopia  Eyeglass Final Rx     Eyeglass Final Rx       Sphere Cylinder Axis Dist VA Add    Right -2.25 +1.50 010 20/20 +2.00    Left -1.25 +0.50 175 20/20 +2.00    Type:  Bifocal    Expiration Date:  3/8/2019                  RTC 1 yr

## 2018-03-07 NOTE — PROCEDURES
Follow up:   2 weeks ago BOTOX effect wore off   Used up all her percocet   3 wks h/o:   Reports frequent falls - falling forward, no LOC, no injuries, able to protect falls by hands   triggers - unknown   Also occ stumbling   Dragging L foot   No Pain in back or legs   No numbness or weakness in legs   No B/B incontinence   No lightheadedness     Prior note:    Flare up of TMJ and HA during daughter's wedding   NSAIDS helped   2 weeks ago BOTOX effect wore off      Prior note:   2 weeks ago BOTOX effect wore off   Has severe spasm and pain in the traps catalina   Weather change has provoked severe migraine + nausea   She started coumadin       Prior note:   3 weeks ago BOTOX effect wore off   She reports severe daily HA    During BOTOX periods she feels she  her HA. Much less intense      Prior note:   The patient is a 51-year-old female who [facility] for followup. Since her last visit, she reports growing frustration   about the fact that nothing has helped her with regards to her headaches. She   continues to experience daily headaches. She takes mefenamic acid and   tizanidine every night, which only provides her two hours of relief. She is   unable to sleep because of the refractory headaches. She is incapacitated   because of severe headaches. She reports minimal relief with infusions that she  gets on a periodic basis. She does report an improvement overall with the   Botox. However, this effect has worn off in the last two weeks. She also   reports an episode wherein she fell with loss of consciousness, which was not   provoked. As a result, she injured her ankle and is currently wearing a boot.      Prior note:   since last week - Reports vertigo for 6 - 7 minutes upto 3 times daily   Nearly daily severe HA - no response to ponstel , compazine   She is late for her BOTOX - last 2 weeks were painful       Follow up:   R sided Headache is constant max at TMJ on R  "  Prior note:   She reports new episodes of R face tingling. Sh also reports 2 episodes of blurred vision lasting 15 minutes. These hapended while watching TV. Her pain remains unchanged   Follow up:   2 days of severe HA during Thanksgiving   Pounding bifrontal region   Pain worse over L eye brow   Follow up:   Reports bifrontal severe HA (worse on L) with no nausea with sudden onset . Occurs daily   NO note:  I found no papilledema or other changes to suggest elevated intracranial pressure or other alternative etiology for her headaches. Repeat exam in two years.  HA are less frequent and less intense.   (R levator scapula spasm)   symptoms better with lateral flexion to L   Prior note:   She still has daily HA with severe sharp stabbing pain behind L eye lasting for 15 sec   Prior note:   Daily pain. 2/week - nausea.  Shu Lares RN at 9/17/2014 4:53 PM  Pt presented to clinic for medical advice. Pt is seen by Dr. Paredes and Dr. Cortez.  1) She went ER on 9/13/14 for a migraine. She was given Reglan, Benadryl, MSO4 and IVF. A couple days later she developed an all over body "tremor". She states "I think I have Parkinson's". - Per Dr. Paredes, medication related tremor (Reglan & Benadryl). Informed her it should wear off in a few days. If not, she is to call and let us know.  2) Headaches - triggered by insomnia.   Current meds:  Ketoprofen - she took it once and said her "throat closed up" and she's not supposed to have anything with aspirin in it.  Nortriptyline - she was taking it at night, but it didn't help her sleep, so she stopped it.  Per Dr. Cortez, discontinue Ketoprofen and Nortriptyline. Start Effexor XR 37.5mg QD, tizanidine 4mg BID PRN headache and Klonopin 0.25 - 0.5mg QHS PRN. Will schedule pt for sphenocath procedure within the next week.   Prior note:   48 yo woman with history of HTN and asthma, both reportedly controlled, in hospital early 2013 after "passed out" and became unresponsive. " "CPR in the field for 8 minutes until EMS arrived. Per ED note, on arrival she was found to be in PEA, given epinephrine. Vfib/Vtach was achieved which was shocked x1. Patient converted to sinus tachycardia after shock. I do not know the time course for the above treatment. On arrival to facility patient was in sinus tachycardia with strong pulses, intubated and unresponsive. ET tube placement was confirmed with direct laryngoscopy and good bilateral breath sounds were heard after the tube was pulled back 2 cm. Reported to have "withdrawal" movements in ER during stabilization, then sedated and cooling protocol initiated. Had remarkable   recovery and first seen in clinic in April 2013.   Headache history: cluster   Age of onset - 2012   Location - behind L eye   Nature of pain - sharp   Prodrome - no   Aura - No   Duration of headache - 6-7 min   Time to peak intensity -   Pain scale - range of intensity - 9/10   Frequency - daily   Status Migrainosus history - 1-3 days   Time of day of most headaches- anytime   Associated symptoms with the headache:   Red eyes   swelling of L face   Headache history: Migraine   Age of onset - 2012   Location - bifrontal   Nature of pain - Pounding   Prodrome - no   Aura - No   Duration of headache - > 4 hrs   Time to peak intensity -   Pain scale - range of intensity - 9/10   Frequency - 5/week   Status Migrainosus history - 1-3 days   Time of day of most headaches- anytime   Associated symptoms with the headache:   Meningeal symptoms - photophobia, phonophobia, exercise intolerance +   Nausea/vomitting +   Nasal drainage   Visual blurriness +   Pallor/flushing   Dizziness   Vertigo   Confusion   Impaired concentration +   Pain worsened with physical activity +   Neck pain +   Symptoms of increased intracranial pressure:   Whooshing sounds - no   Visual spots/blotches - no   Headache Triggers: unknown   Other comorbid conditions:   Anxiety - no   Motion sickness symptom - no " "      Treatment history:   Resolution of headache with sleep - yes       Meds tried:   Trigger points involvin/9/15 helped for 1 day   Site: Right   Levator scapula   Pharmacological agent:   0.5% Sensorcaine solution   No complications were noted.  Supraorbital block - helped for 2 days   Tylenol - not help   imitrex - chest tightness   alleve - help   topamax - not helping   Neurontin - not helping   Paxil, Elavil - not help   seroquel - nightmare   Ketoprofen - she took it once and said her "throat closed up" and she's not supposed to have anything with aspirin in it.  Nortriptyline - she was taking it at night, but it didn't help her sleep, so she stopped it.  reglan - parkinsonism   zanaflex - help   Toradol 30 mg IM inj at home - too expensive   BOTOX round #1 9/3/15 - helped (R levator scapula spasm)   Round #2 12/3/15 - helped   Round#3 3/316 - helped   Round #4 16 - helped   BOTOX#5 9/15/16 - helped     BOTOX#6 - 16 - helped   BOTOX#7 - 3/9/17 - helped    BOTOX#8 - 17 - helped    BOTOX#9 8/10/17 - helped   BOTOX#10 10/19/17 - helped   BOTOX#11 17 - helped      Can not do RFA - pt has pacemaker   Procedure: IOVERA peripheral nerve focus cold therapy (non ablative cryotherapy) 12/30/15 - not help   Indication: Cryotherapy of select peripheral nerves of the head was performed to treat chronic headaches that failed to respond to several preventive pharmacological therapies.   Sedation: None   Informed consent: The procedure, risks, benefits and options were discussed with the patient. There are no contraindications to the procedure. The patient expressed understanding and agreed to the procedure. I verify that I personally obtained the patient's consent prior to the start of the procedure.  Location:  Bilateral Supra orbital nerve (3 cycles on R and 1 cycle on L)   Bilateral Occipital nerve   3 cycles   Pain relief: Pain score reduced 10/10->0/10    Bilateral Sphenopalatine Ganglion " and bilateral Maxillary Branch (Trigeminal Nerve) Block - no help   ONB - not help                                            Shannon Pagan RN at 12/31/2014 3:54 PM       Status: Signed                   Expand All Collapse All   HEADACHE FASTTRACK  PAIN 7/10 NAUSEA 0/10  ROUND 1: TORADOL, IMITREX, MORPHINE, BENADRYL, AND MAGNESIUM  PAIN 7/10 NAUSEA 0/10  PT STATED SHE WAS READY TO GO HOME AND GO TO SLEEP. PT D/C'ED IN [facility]            Shannon Pagan RN at 10/5/2015 12:49 PM       Status: Signed                   Expand All Collapse All   HEADACHE FASTTRACK  PAIN 8/10 NAUSEA 10/10  FIRST ROUND: tORADOL, BENADRYL, COMPAZINE, IMITREX, MAGNESIUM, AND VALPROATE.  PAIN 1/10 NAUSEA 0/10  PT READY TO GO HOME AND FEELING BETTER. PT LEFT IN NAD WITH FAMILY MEMBER  TOTAL TIME: 3287-7207       Shannon Pagan RN at 10/20/2015 3:05 PM       Status: Signed                   Expand All Collapse All   HEADACHE FASTTRACK  PAIN 10/10 NAUSEA 0/10  FIRST ROUND: tORADOL, BENADRYL, COMPAZINE, IMITREX, MAGNESIUM, AND VALPROATE.  BP BEING MONITORED EVERY 15 MIN AND REMAINED 170'S/80-90'S. DR CHOPRA NOTIFIED AND STATED TO MAKE SURE BP DID NOT EXCEED 180 SYSTOLIC OR PT SHOULD REPORT TO ED. BP AT 1500 183/92. DR CHOPRA NOTIFIED AND GAVE ORDER TO STOP INFUSION AND SEND PATIENT TO ED. PT BROUGHT TO ED BY INFUSION NURSE VIA WHEELCHAIR.   PAIN 8/10 NAUSEA 0/10  TOTAL TIME: 7444-9919               Progress Notes                           Shannon Pagan RN at 2/24/2016 1:36 PM       Status: Signed                  Expand All Collapse All   HEADACHE FASTTRACK  PAIN 10/10 NAUSEA 7/10  FIRST ROUND: tORADOL, BENADRYL, AND MORPHINE-BP RANGING FROM 177-203/84-92, HR 60-82  MD NOTIFIED AND GAVE ORDERS TO SEND TO ED. PT LEFT VIA W/C WITH INFUSION NURSE ON WAY TO ED.            Headache risk factors:   H/o TBI - [facility] (2013) + neck sprain   H/o Meningitis - no   F/h Aneurysms - no       Review of Systems   Constitutional: Negative.   HENT: Negative.   Eyes: Negative.    Respiratory: Negative.   Cardiovascular: Negative.   Gastrointestinal: Negative.   Endocrine: Negative.   Genitourinary: Negative.   Musculoskeletal: Negative.   Skin: Negative.   Allergic/Immunologic: Negative.   Hematological: Negative.   Psychiatric/Behavioral: insomnia      Objective:     Physical Exam   Constitutional: She is oriented to person, place, and time. She appears well-developed.   obesity   HENT:   Head: Normocephalic and atraumatic.   Right Ear: External ear normal.   Left Ear: External ear normal.   Nose: Nose normal.   Mouth/Throat: Oropharynx is clear and moist.   Eyes: Conjunctivae and EOM are normal. Pupils are equal, round, and reactive to light.   Neck: Normal range of motion.   Cardiovascular: Regular rhythm.   Pulmonary/Chest: Effort normal and breath sounds normal.   Musculoskeletal: Normal range of motion.   Neurological: She is alert and oriented to person, place, and time. She has normal reflexes. She displays normal reflexes. No cranial nerve deficit. She exhibits normal muscle tone. Coordination normal. Uses cane.   Ataxic gait - wide based   Skin: Skin is warm and dry.   Psychiatric: She has a normal mood and flat affect. Her behavior is normal. Judgment and thought content normal.   Headache Exam:  Optic disc is flat OU   Temples appear symmetric  No V1,V2,V3 distribution allodynia/hyperalgesia catalina   No facial swelling  No gross TMJ abnormalities   No ptosis   No conj injection   No meningismus   No neck stiffness  No C spine tenderness  Cervical facet tenderness on palpation catalina   No tender points over trapezium   catalina supraorbital nerve exit point tenderness  catalina occipital nerve exit point tenderness  No auriculotemporal nerve tenderness   Tenderness of levator scapula catalina     Gait - slight reduced wt bearing on LLE, no rotation. One brief episode of foot drag on L             Assessment:     1.  Occipital neuralgia     2.  Cervicalgia     3.  4  5  6 Chronic migraine without  aura, with intractable migraine, so stated, with status migrainosus (post traumatic) post concussive?  Depression   TMJ - R sided   Insomnia       Head CT  Findings: There is no evidence of acute or recent major vascular territory cerebral infarction, parenchymal hemorrhage, or intra-axial mass.  There are no extra-axial masses or abnormal fluid collections.  The ventricular system is within normal limits of size for age and shows no distortion by mass-effect or evidence of hydrocephalus.  There is no fracture or destructive osseous lesion.  The extracranial soft tissues are unremarkable.  The visualized paranasal sinuses and mastoid air cells are clear.   Impression    No acute intracranial abnormality.  ______________________________________     Electronically signed by resident: KRISSY MAYERS MD  Date: 03/14/16  Time: 17:09            Results for PUSHPA KEY (MRN [id number]) as of 9/3/2015 14:26     Ref. Range  7/20/2015 11:06  8/19/2015 14:15    Vitamin B-12  Latest Range: 210-950 pg/mL  383       Retinol, serum  Latest Range:  ug/dL     52    Vitamin B2   Latest Range: 1-19 mcg/L      2     Vitamin B6   Latest Range: 5-50 ug/L      4 (L)     Vit D, 25-Hydroxy   Latest Range: 30-96 ng/mL   13 (L)           Plan:     1. Prophylactic medication -   Despiramine 25 mg AM (for dizziness)   Cymbalta 120 mg daily   Aricept 20 mg daily   Neurontin 900 mg TID   Magnesium 200 mg daily  Zanaflex 8 mg PRN   Topamax 200 mg BID   Cont B2, B6 supplements   2, Breakthrough headache - zanaflex 8 mg, Gabitril 8 mg  PRN percocet Q=30  Multiple alternative treatment options were offered to the patient   3. Refrain from over counter pain medications. Discussed medication overuse headache.cont Magnesium 400 mg PO BID   4. Occipital neuralgia - If medical management is ineffective may consider occipital nerve blocks.   5. I again urged the patient to keep a headache calender.    f/up Dr. Rock for TBI    B12 injection -  5/25/17  6, MRI brain, see Dr. Paredes for falls      BOTOX was performed as an indicated therapy for intractable chronic migraine headaches given that the patient failed > 5 prophylactic medications  Botulinum Toxin Injection Procedure   Pre-operative diagnosis: Chronic migraine   Post-operative diagnosis: Same   Procedure: Chemical neurolysis   After risks and benefits were explained including bleeding, infection, worsening of pain, damage to the areas being injected, weakness of muscles, loss of muscle control, dysphagia if injecting the head or neck, facial droop if injecting the facial area, painful injection, allergic or other reaction to the medications being injected, and the failure of the procedure to help the problem, a signed consent was obtained.   The patient was placed in a comfortable area and the sites to be treated were identified.The area to be treated was prepped three times with alcohol and the alcohol allowed to dry. Next, a 30 gauge needle was used to inject the medication in the area to be treated.   Area(s) injected:   Total Botox used: 175 Units   Botox wastage: 25 Units   Injection sites:    muscle bilaterally ( a total of 10 units divided into 2 sites)   Procerus muscle (5 units)   Frontalis muscle bilaterally (a total of 20 units divided into 4 sites)   Temporalis muscle bilaterally (a total of 40 units divided into 8 sites)   Occipitalis muscle bilaterally (a total of 30 units divided into 6 sites)   Cervical paraspinal muscles (a total of 20 units divided into 4 sites)   Trapezius muscle bilaterally (a total of 30 units divided into 6 sites)  Masseter 5 units x 2   Complications: none       RTC for the next Botox injection: 10 weeks

## 2018-03-07 NOTE — PROGRESS NOTES
Patient reports no bleeding or bruising and no new medications.  She has been eating better (with the assistance of a nutritionist) and has been exercising.  She would like to increase her greens intake to 1x/week and we provided her with a food list.  I am boosting her warfarin dose tonight and increasing her current weekly dose.  I reminded the patient to call with any problems, changes or questions before the next visit.  I have reviewed the student's initial findings and agree with their assessment.  I have personally spoken with and assessed the patient in clinic to devise care plan.

## 2018-03-08 ENCOUNTER — PATIENT MESSAGE (OUTPATIENT)
Dept: PSYCHIATRY | Facility: CLINIC | Age: 53
End: 2018-03-08

## 2018-03-13 ENCOUNTER — OFFICE VISIT (OUTPATIENT)
Dept: DERMATOLOGY | Facility: CLINIC | Age: 53
End: 2018-03-13
Payer: MEDICARE

## 2018-03-13 ENCOUNTER — ANTI-COAG VISIT (OUTPATIENT)
Dept: CARDIOLOGY | Facility: CLINIC | Age: 53
End: 2018-03-13
Payer: MEDICARE

## 2018-03-13 ENCOUNTER — HOSPITAL ENCOUNTER (OUTPATIENT)
Dept: RADIOLOGY | Facility: HOSPITAL | Age: 53
Discharge: HOME | End: 2018-03-13
Attending: PSYCHIATRY & NEUROLOGY
Payer: MEDICARE

## 2018-03-13 ENCOUNTER — DOCUMENTATION ONLY (OUTPATIENT)
Dept: ELECTROPHYSIOLOGY | Facility: CLINIC | Age: 53
End: 2018-03-13

## 2018-03-13 DIAGNOSIS — Z79.01 LONG TERM (CURRENT) USE OF ANTICOAGULANTS: Primary | ICD-10-CM

## 2018-03-13 DIAGNOSIS — I48.0 PAROXYSMAL ATRIAL FIBRILLATION: ICD-10-CM

## 2018-03-13 DIAGNOSIS — L30.9 ECZEMA, UNSPECIFIED TYPE: Primary | ICD-10-CM

## 2018-03-13 DIAGNOSIS — R29.818 NEUROLOGICAL DEFICIT PRESENT: ICD-10-CM

## 2018-03-13 DIAGNOSIS — L81.0 POST-INFLAMMATORY HYPERPIGMENTATION: ICD-10-CM

## 2018-03-13 DIAGNOSIS — R29.6 FREQUENT FALLS: ICD-10-CM

## 2018-03-13 DIAGNOSIS — Z86.73 HISTORY OF CVA (CEREBROVASCULAR ACCIDENT): ICD-10-CM

## 2018-03-13 LAB — INR PPP: 2.2 (ref 2–3)

## 2018-03-13 PROCEDURE — 99999 PR PBB SHADOW E&M-EST. PATIENT-LVL II: CPT | Mod: PBBFAC,,, | Performed by: DERMATOLOGY

## 2018-03-13 PROCEDURE — 99212 OFFICE O/P EST SF 10 MIN: CPT | Mod: PBBFAC | Performed by: DERMATOLOGY

## 2018-03-13 PROCEDURE — 99202 OFFICE O/P NEW SF 15 MIN: CPT | Mod: S$PBB,,, | Performed by: DERMATOLOGY

## 2018-03-13 PROCEDURE — 85610 PROTHROMBIN TIME: CPT | Mod: PBBFAC

## 2018-03-13 RX ORDER — TRIAMCINOLONE ACETONIDE 1 MG/G
CREAM TOPICAL
Qty: 60 G | Refills: 1 | Status: SHIPPED | OUTPATIENT
Start: 2018-03-13 | End: 2018-10-09

## 2018-03-13 NOTE — PROGRESS NOTES
Subjective:       Patient ID:  Amna Chawla is a 52 y.o. female who presents for   Chief Complaint   Patient presents with    Rash     Neck and Back     Rash  - Initial  Affected locations: neck and back  Duration: 2 weeks  Signs / symptoms: itching  Severity: mild  Aggravated by: nothing  Relieving factors/Treatments tried: nothing  Improvement on treatment: no relief    Having night sweats/perimenopause.  Has spoken w Pcp but unable to take any hormone therapy due to underlying heart issues.    Rash is around neck and back; sometimes keeps her up at night.  Uses Dove soap sensitive skin and Tide detergent (normal).    Review of Systems   Constitutional: Negative for fever, chills and fatigue.   Skin: Positive for rash.   Hematologic/Lymphatic: Bruises/bleeds easily.        Objective:    Physical Exam   Constitutional: She appears well-developed and well-nourished. She is obese.    Skin:   Areas Examined (abnormalities noted in diagram):   Scalp / Hair Palpated and Inspected  Head / Face Inspection Performed  Neck Inspection Performed  Chest / Axilla Inspection Performed  Abdomen Inspection Performed  RUE Inspected  LUE Inspection Performed              Diagram Legend     Erythematous scaling macule/papule c/w actinic keratosis       Vascular papule c/w angioma      Pigmented verrucoid papule/plaque c/w seborrheic keratosis      Yellow umbilicated papule c/w sebaceous hyperplasia      Irregularly shaped tan macule c/w lentigo     1-2 mm smooth white papules consistent with Milia      Movable subcutaneous cyst with punctum c/w epidermal inclusion cyst      Subcutaneous movable cyst c/w pilar cyst      Firm pink to brown papule c/w dermatofibroma      Pedunculated fleshy papule(s) c/w skin tag(s)      Evenly pigmented macule c/w junctional nevus     Mildly variegated pigmented, slightly irregular-bordered macule c/w mildly atypical nevus      Flesh colored to evenly pigmented papule c/w intradermal nevus        Pink pearly papule/plaque c/w basal cell carcinoma      Erythematous hyperkeratotic cursted plaque c/w SCC      Surgical scar with no sign of skin cancer recurrence      Open and closed comedones      Inflammatory papules and pustules      Verrucoid papule consistent consistent with wart     Erythematous eczematous patches and plaques     Dystrophic onycholytic nail with subungual debris c/w onychomycosis     Umbilicated papule    Erythematous-base heme-crusted tan verrucoid plaque consistent with inflamed seborrheic keratosis     Erythematous Silvery Scaling Plaque c/w Psoriasis     See annotation      Assessment / Plan:        Eczema, unspecified type - likely due to night sweating from menopause as she has discussed w her primary doctor - advise keeping cool bedside rag, bedside fan at night  -     triamcinolone acetonide 0.1% (KENALOG) 0.1 % cream; AAA bid to rash up to 2 weeks  Dispense: 60 g; Refill: 1  -I advised changing to a fragrance free bar soap or body wash such as Dove, and fragrance free detergent such as Tide Free & Clear, for sensitive skin.    Good skin care regimen discussed including limiting to one bath or shower/day, using lukewarm water with mild soap and moisturizing cream to skin 1 - 2x/day. Brochure was provided and reviewed with patient.    Post-inflammatory hyperpigmentation  -will fade over time           Follow-up if symptoms worsen or fail to improve.

## 2018-03-13 NOTE — PROGRESS NOTES
Received call from MRI regarding compatiblity.  Verified with Jeremias rice/ Maryanne Shah that patient does not have a complete MRI safe system.  The pamela RA lead model 4136 is not labelled for MRI.

## 2018-03-13 NOTE — PROGRESS NOTES
Patient urged to return sooner for follow up but again reports she cannot. Pt seen by Nilam MARTELL. I have reviewed her initial findings and agree with her assessment and plan

## 2018-03-13 NOTE — PATIENT INSTRUCTIONS
XEROSIS (DRY SKIN)        1. Definition    Xerosis is the term for dry skin.  We all have a natural oil coating over our skin produced by the skin oil glands.  If this oil is removed, the skin becomes dry which can lead to cracking, which can lead to inflammation.  Xerosis is usually a long-term problem that recurs often, especially in the winter.    2. Cause     Long hot baths or showers can remove our natural oil and lead to xerosis.  One should never take more than one bath or shower a day and for no longer than ten minutes.   Use of harsh soaps such as Zest, Dial, and Ivory can worsen and cause xerosis.   Cold winter weather worsens xerosis because the amount of moisture contained in cold air is much less than the amount of moisture in warm air.    3. Treatment     Treatment is intended to restore the natural oil to your skin.  Keep the skin lubricated.     Do not take more than one bath or shower a day.  Use lukewarm water, not hot.  Hot water dries out the skin.     Use a gentle moisturizing soap such as Cetaphil soap, Oil of Olay, Dove, Basis, Ivory moisture care, Restoraderm cleanser.     When toweling dry, dont rub.  Blot the skin so there is still some water left on the skin.  You should apply a moisturizing cream to all of the skin such as Cerave cream, Cetaphil cream, Restoraderm or Eucerin Original Formula cream.   Alpha hydroxyacid lotions, i.e., AmLactin, also work very well for preventing dry skin, but may burn when used on inflamed or reddened skin.     If you like to swim during the winter months, you should not use soap when getting out of the pool.  When you have finished swimming, rinse off the chlorine with cool to warm water.  If this will be the only shower of the day, then you may use Cetaphil or another mild soap to cleanse your skin.  After the shower, apply a moisturizing cream to all of the skin as above.        [address] [facility] [address]/ [phone]  [phone] FAX/ [email or web address]

## 2018-03-13 NOTE — LETTER
March 13, 2018      Jamil Child MD  [address]  [address]  [facility] [address]           [facility] - Dermatology  [address]  [place] [address]  Phone: [phone]  Fax: [phone]          Patient: Amna Chawla   MR Number: [id number]   YOB: 1965   Date of Visit: 3/13/2018       Dear Dr. Jamil Child:    Thank you for referring Amna Chawla to me for evaluation. Attached you will find relevant portions of my assessment and plan of care.    If you have questions, please do not hesitate to call me. I look forward to following Amna Chawla along with you.    Sincerely,    Shu Dover MD    Enclosure  CC:  No Recipients    If you would like to receive this communication electronically, please contact [email or web address] or [phone] to request more information on [employer] access.    For providers and/or their staff who would like to refer a patient to [facility], please contact us through our one-stop-shop provider referral line, [facility], at [phone].    If you feel you have received this communication in error or would no longer like to receive these types of communications, please e-mail [email or web address]

## 2018-03-13 NOTE — PROGRESS NOTES
Quick follow up for low INR on 3/7. INR within therapeutic range today. Patient denies any bleeding, bruising or other changes that would affect warfarin therapy. Will maintain current dose. I would like to follow up with this patient in 2 weeks but due to patient's schedule and her request I will follow up in 3 weeks. Advised patient to notify us of any changes or concerns.

## 2018-03-14 ENCOUNTER — TELEPHONE (OUTPATIENT)
Dept: NEUROLOGY | Facility: CLINIC | Age: 53
End: 2018-03-14

## 2018-03-19 ENCOUNTER — OUTPATIENT CASE MANAGEMENT (OUTPATIENT)
Dept: ADMINISTRATIVE | Facility: OTHER | Age: 53
End: 2018-03-19

## 2018-03-19 NOTE — PROGRESS NOTES
Spoke with pt who reports she has been taking her BP and it has been within normal range. Discussed with pt the importance of monitoring and logging her BP daily. Discussed signs and symptoms of hypertension and hypotension. Pt reports she had her ECHO and got good results. Pt reports she cannot have the MRI her Neurologist wanted her to have due to her defibrillator.. Discussed pts upcoming appointments. Pt reports she received a letter about CCM and is very interested. Discussed CCM with pt and notified her they would be contacting her within the next 2 weeks. Caregiver/patient has contact information for O[facility] in the event that needs develop as well as [facility] contact information. Will close case at this time. JERRI Hawthorne RN-OPCM     Follow up plan:     Continue to educate about risk of infection. Review signs and symptoms of infection. Encourage patient to follow medication and treatment regimen. Encourage patient to maintain follow up with doctors.

## 2018-04-01 ENCOUNTER — EXTERNAL CHRONIC CARE MANAGEMENT (OUTPATIENT)
Dept: PRIMARY CARE CLINIC | Facility: CLINIC | Age: 53
End: 2018-04-01
Payer: MEDICARE

## 2018-04-02 RX ORDER — CARVEDILOL 25 MG/1
TABLET ORAL
Qty: 180 TABLET | Refills: 1 | Status: SHIPPED | OUTPATIENT
Start: 2018-04-02 | End: 2018-08-21 | Stop reason: SDUPTHER

## 2018-04-03 ENCOUNTER — OFFICE VISIT (OUTPATIENT)
Dept: PSYCHIATRY | Facility: CLINIC | Age: 53
End: 2018-04-03
Payer: MEDICARE

## 2018-04-03 ENCOUNTER — ANTI-COAG VISIT (OUTPATIENT)
Dept: CARDIOLOGY | Facility: CLINIC | Age: 53
End: 2018-04-03
Payer: MEDICARE

## 2018-04-03 ENCOUNTER — HOSPITAL ENCOUNTER (OUTPATIENT)
Dept: CARDIOLOGY | Facility: CLINIC | Age: 53
Discharge: HOME | End: 2018-04-03
Payer: MEDICARE

## 2018-04-03 ENCOUNTER — OFFICE VISIT (OUTPATIENT)
Dept: ELECTROPHYSIOLOGY | Facility: CLINIC | Age: 53
End: 2018-04-03
Payer: MEDICARE

## 2018-04-03 VITALS
SYSTOLIC BLOOD PRESSURE: 110 MMHG | DIASTOLIC BLOOD PRESSURE: 62 MMHG | HEIGHT: 64 IN | BODY MASS INDEX: 47.24 KG/M2 | WEIGHT: 276.69 LBS | HEART RATE: 62 BPM

## 2018-04-03 VITALS
HEART RATE: 60 BPM | SYSTOLIC BLOOD PRESSURE: 158 MMHG | WEIGHT: 277.13 LBS | DIASTOLIC BLOOD PRESSURE: 79 MMHG | BODY MASS INDEX: 47.57 KG/M2

## 2018-04-03 DIAGNOSIS — F33.1 DEPRESSION, MAJOR, RECURRENT, MODERATE: ICD-10-CM

## 2018-04-03 DIAGNOSIS — I44.2 COMPLETE AV BLOCK: ICD-10-CM

## 2018-04-03 DIAGNOSIS — I42.8 NONISCHEMIC CARDIOMYOPATHY: Primary | ICD-10-CM

## 2018-04-03 DIAGNOSIS — Z86.74 HISTORY OF SUDDEN CARDIAC ARREST: ICD-10-CM

## 2018-04-03 DIAGNOSIS — I10 ESSENTIAL HYPERTENSION: ICD-10-CM

## 2018-04-03 DIAGNOSIS — Z95.0 PACEMAKER: ICD-10-CM

## 2018-04-03 DIAGNOSIS — I48.0 PAROXYSMAL ATRIAL FIBRILLATION: ICD-10-CM

## 2018-04-03 DIAGNOSIS — F41.9 ANXIETY: Primary | ICD-10-CM

## 2018-04-03 DIAGNOSIS — Z95.810 BIVENTRICULAR AUTOMATIC IMPLANTABLE CARDIOVERTER DEFIBRILLATOR IN SITU: ICD-10-CM

## 2018-04-03 DIAGNOSIS — G47.00 INSOMNIA, UNSPECIFIED TYPE: ICD-10-CM

## 2018-04-03 DIAGNOSIS — E66.01 OBESITY, CLASS III, BMI 40-49.9 (MORBID OBESITY): ICD-10-CM

## 2018-04-03 DIAGNOSIS — Z79.01 LONG TERM (CURRENT) USE OF ANTICOAGULANTS: Primary | ICD-10-CM

## 2018-04-03 DIAGNOSIS — Z79.01 LONG TERM (CURRENT) USE OF ANTICOAGULANTS: ICD-10-CM

## 2018-04-03 LAB — INR PPP: 1.4 (ref 2–3)

## 2018-04-03 PROCEDURE — 99999 PR PBB SHADOW E&M-EST. PATIENT-LVL IV: CPT | Mod: PBBFAC,,, | Performed by: INTERNAL MEDICINE

## 2018-04-03 PROCEDURE — 90833 PSYTX W PT W E/M 30 MIN: CPT | Mod: S$PBB,,, | Performed by: NURSE PRACTITIONER

## 2018-04-03 PROCEDURE — 99214 OFFICE O/P EST MOD 30 MIN: CPT | Mod: PBBFAC,25 | Performed by: INTERNAL MEDICINE

## 2018-04-03 PROCEDURE — 99212 OFFICE O/P EST SF 10 MIN: CPT | Mod: PBBFAC,27 | Performed by: NURSE PRACTITIONER

## 2018-04-03 PROCEDURE — 93005 ELECTROCARDIOGRAM TRACING: CPT | Mod: PBBFAC | Performed by: INTERNAL MEDICINE

## 2018-04-03 PROCEDURE — 99214 OFFICE O/P EST MOD 30 MIN: CPT | Mod: S$PBB,,, | Performed by: INTERNAL MEDICINE

## 2018-04-03 PROCEDURE — 99211 OFF/OP EST MAY X REQ PHY/QHP: CPT | Mod: S$PBB,25,, | Performed by: PHARMACIST

## 2018-04-03 PROCEDURE — 99999 PR PBB SHADOW E&M-EST. PATIENT-LVL II: CPT | Mod: PBBFAC,,, | Performed by: NURSE PRACTITIONER

## 2018-04-03 PROCEDURE — 90833 PSYTX W PT W E/M 30 MIN: CPT | Mod: PBBFAC | Performed by: NURSE PRACTITIONER

## 2018-04-03 PROCEDURE — 85610 PROTHROMBIN TIME: CPT | Mod: PBBFAC

## 2018-04-03 PROCEDURE — 99213 OFFICE O/P EST LOW 20 MIN: CPT | Mod: S$PBB,,, | Performed by: NURSE PRACTITIONER

## 2018-04-03 PROCEDURE — 93010 ELECTROCARDIOGRAM REPORT: CPT | Mod: S$PBB,,, | Performed by: INTERNAL MEDICINE

## 2018-04-03 RX ORDER — ZOLPIDEM TARTRATE 10 MG/1
10 TABLET ORAL NIGHTLY PRN
Qty: 30 TABLET | Refills: 5 | Status: SHIPPED | OUTPATIENT
Start: 2018-04-03 | End: 2018-07-23 | Stop reason: DRUGHIGH

## 2018-04-03 RX ORDER — CLONAZEPAM 1 MG/1
1 TABLET ORAL DAILY PRN
Qty: 30 TABLET | Refills: 5 | Status: SHIPPED | OUTPATIENT
Start: 2018-04-03 | End: 2019-01-07 | Stop reason: SDUPTHER

## 2018-04-03 RX ORDER — HYDROXYZINE HYDROCHLORIDE 50 MG/1
50 TABLET, FILM COATED ORAL NIGHTLY PRN
Qty: 30 TABLET | Refills: 5 | Status: SHIPPED | OUTPATIENT
Start: 2018-04-03 | End: 2019-01-07 | Stop reason: SDUPTHER

## 2018-04-03 RX ORDER — ARIPIPRAZOLE 5 MG/1
5 TABLET ORAL NIGHTLY
Qty: 30 TABLET | Refills: 5 | Status: SHIPPED | OUTPATIENT
Start: 2018-04-03 | End: 2019-01-07

## 2018-04-03 RX ORDER — DULOXETIN HYDROCHLORIDE 60 MG/1
60 CAPSULE, DELAYED RELEASE ORAL 2 TIMES DAILY
Qty: 60 CAPSULE | Refills: 5 | Status: SHIPPED | OUTPATIENT
Start: 2018-04-03 | End: 2019-01-07 | Stop reason: SDUPTHER

## 2018-04-03 NOTE — PROGRESS NOTES
Subjective:    Patient ID:  Amna Chawla is a 52 y.o. female who presents for follow-up of Complete AV block      HPI  Prior Hx:  I had the pleasure of seeing Amna Chawla in follow-up today for her history of cardiac arrest, heart block, and ICD implantation. As you are aware, she is a 52-year old female, former patient of Dr. Humberto Martins and patient of mine I cared for when she initially presented in cardiac arrest as well as followed in my general cardiology fellow clinic, who was admitted to [facility] in 2/2013 after having a cardiac arrest.  She was working as a school  and collapsed at work.  On EMS arrival it was described that she was pulseless and given epinephrine (? Initially PEA) however after epinephrine noted to be in VF and was resuscitated with defibrillation/ACLS.  She underwent hypothermia protocol. Her post-arrest course was notable or intermittent 3rd-degree AV block. On 2/15/2013, a dual chamber ICD was implanted. Her EF prior to discharge was 60%. She had a negative coronary CTA during that admission.  In subsequent follow-up she underwent a pharmacologic stress ECHO in 2014 which showed a preserved LVEF and no ischemia.     Subsequent ICD interrogations show no VT, 100% RV pacing.  She was also diagnosed with symptomatic paroxysmal AF and was started on anticoagulation. She preferred coumadin.  She noted new onset dyspnea on exertion 9/2017. We performed an echocardiogram and her EF was 40-45% in setting of chronic RV pacing. Recent PET stress showed no ischemia/infarct. We proceeded with upgrade to CRT-D which was performed on 9/27/2017.     At Ms. Chawla's 3 month post CRT-D upgrade visit she noted more energy and improvement in the shortness of breath. She noted very rare diaphragm stimulation.    Interim Hx:  Ms. Chawla presents for 6 month post-CRT upgrade follow-up. We planned on getting an ECHO at the 6 month chu however it was done early at the 4 month chu. Her EF  improved to 45-50% on that study (see below). Her main issues are complex headaches for which she is seeing neurology. She notes continued occasional episodes of chest heaviness not related with exertion as well as brief episodes of light-headedness. She had an episode in the waiting room this morning. She has had issues maintaining therapeutic anticoagulation with warfarin and is now interested in alternative agents.    Device interrogated by myself in office. Last episode of AF was 2/27-3/3/2018. Average ventricular rate was 77 bpm. None since and no arrhythmia today.    CONCLUSIONS     1 - Mildly depressed left ventricular systolic function (EF 45-50%).     2 - Normal right ventricular systolic function .     3 - Indeterminate LV diastolic function.     4 - Moderate left atrial enlargement.        I reviewed today's ECG tracing, which shows sinus rhythm with BiV paced complexes with QRS duration of 136msec.    Review of Systems   Constitution: Negative for fever, weakness and malaise/fatigue.   HENT: Negative for congestion and sore throat.    Eyes: Negative for blurred vision and visual disturbance.   Cardiovascular: Negative for chest pain, dyspnea on exertion, near-syncope, orthopnea, palpitations, paroxysmal nocturnal dyspnea and syncope.   Respiratory: Negative for cough and shortness of breath.    Hematologic/Lymphatic: Negative for bleeding problem. Does not bruise/bleed easily.   Skin: Negative.    Musculoskeletal: Negative.    Gastrointestinal: Negative for hematochezia and melena.   Neurological: Positive for headaches. Negative for focal weakness.        Objective:    Physical Exam   Constitutional: She is oriented to person, place, and time. She appears well-developed and well-nourished. No distress.   HENT:   Head: Normocephalic and atraumatic.   Eyes: Conjunctivae are normal. Right eye exhibits no discharge. Left eye exhibits no discharge.   Neck: Neck supple. No JVD present.   Cardiovascular:  Normal rate, regular rhythm and normal heart sounds.  Exam reveals no gallop and no friction rub.    No murmur heard.  Pulmonary/Chest: Effort normal and breath sounds normal. No respiratory distress. She has no wheezes. She has no rales.   ICD site well healed   Abdominal: Soft. Bowel sounds are normal. She exhibits no distension. There is no tenderness. There is no rebound.   Musculoskeletal: She exhibits no edema.   Neurological: She is alert and oriented to person, place, and time.   Skin: Skin is warm and dry. She is not diaphoretic.   Psychiatric: She has a normal mood and affect. Her behavior is normal. Judgment and thought content normal.   Vitals reviewed.        Assessment:       1. Nonischemic cardiomyopathy from RV pacing    2. Complete AV block    3. Essential hypertension    4. Biventricular automatic implantable cardioverter defibrillator in situ    5. Paroxysmal atrial fibrillation    6. History of sudden cardiac arrest    7. Obesity, Class III, BMI 40-49.9 (morbid obesity)    8. Long term (current) use of anticoagulants         Plan:       In summary, Mrs. Chawla is a pleasant 51 yo cardiac arrest survivor with VF noted during arrest, no ischemic heart disease with preserved LVEF, 3rd degree AV block, newly discovered symptomatic LVEF of 40% in setting of normal PET stress and chronic RV pacing who presents for CRT-D follow-up. Doing well. Continue remote checks every 3 months. Discussed again issues related to DOACs vs warfarin (potential cost issues, reversibility). She desires to change to DOAC. After discussing options she desires to change to eliquis. Stop warfarin and start eliquis in 3 days. She will notify me if it is too expensive.  If patient begins to note symptomatic issues with AF then will consider anti-arrhythmic therapy. Her symptoms do not appear to correlate with times of documented AF on her device.     RTC in 6 months with ECHO. CBC in 6 weeks after starting eliquis.    Patient  getting INR checked today and is meeting with coumadin clinic pharmacist. Anticipate stopping warfarin and then starting eliquis in 3 days unless INR today is significantly supratherapeutic. If so recommend repeating INR in 3 days prior to starting eliquis.     Thank you for allowing me to participate in the care of this patient. Please do not hesitate to call me with any questions or concerns.     Avelino Mejia MD, PhD  Cardiac Electrophysiology

## 2018-04-03 NOTE — PROGRESS NOTES
Pt seen by Nilam MARTELL. I have reviewed her initial findings and agree with her assessment and plan

## 2018-04-03 NOTE — PROGRESS NOTES
Outpatient Psychiatry Follow-Up Visit (MD/NP)    4/3/2018    Clinical Status of Patient:  Outpatient (Ambulatory)    Chief Complaint:  Amna Chawla is a 52 y.o. female who presents today for follow-up of depression and anxiety.  Met with patient.      Last Visit:  was on 1/03/18. Chart reviewed.     Interval History and Content of Current Session:  Current Medication Profile for Psych  · Abilify 5mg po q day (pt failed on 10 mg due to oversedation)  · Cymbalta 60mg po bid  · Ambien 10mg po q hs  · Klonopin 1 mg po daily PRN severe anxiety (increased last visit)  · Also taking Neurontin and Topamax from Neurology    Pt presents bright affect and euthymic mood.  Ambien CR not approved by insurance so switched to standard Ambien 10 mg since last visit. Pt reports medication helps induce sleep but pt often wakes during the night and cannot return to sleep. Educated pt on proper sleep hygiene and will augment with Atarax (hydroxyzine) 50 mg at bedtime.  Pt reports that she has taken benadryl at bedtime with good results.      Denies SI/HI/AVH.  Pt reports no family dynamic issues at this time but wants to be calmer in social situations.  Discussed behavior planning for family events and barbecues.  Suggested pt allow herself a time limit (45 min - 1 hour) so that she doesn't feel discouraged about leaving when she gets overwhelmed. Also pt can take PRN Klonopin before event which yielded successful results at family wedding.  She continues to go to individual psychotherapy with Dr. Monroe.      Compliant with other medications.  Denies side-effects.        Psychotherapy:  · Target symptoms: depression, anxiety   · Why chosen therapy is appropriate versus another modality: relevant to diagnosis  · Outcome monitoring methods: self-report, observation  · Therapeutic intervention type: insight oriented psychotherapy, CBT  · Topics discussed/themes: relationships difficulties, parenting issues, stress related to medical  comorbidities, difficulty managing affect in interpersonal relationships, building skills sets for symptom management, symptom recognition  · The patient's response to the intervention is accepting. The patient's progress toward treatment goals is fair.   · Duration of intervention: 20 minutes.    Review of Systems   · PSYCHIATRIC: Pertinant items are noted in the narrative.  · CONSTITUTIONAL: No weight gain or loss.   · ENDOCRINE: No polydipsia or polyuria.  · INTEGUMENTARY: No rashes or lacerations.  · EYES: No exophthalmos, jaundice or blindness.  · ENT: No dizziness, tinnitus or hearing loss.  · RESPIRATORY: No shortness of breath.  · GASTROINTESTINAL: No nausea, vomiting, pain, constipation or diarrhea.  · GENITOURINARY: No frequency, dysuria or sexual dysfunction.  · HEMATOLOGIC/LYMPHATIC: No excessive bleeding, prolonged or excessive bleeding after dental extraction/injury.  · ALLERGIC/IMMUNOLOGIC: No allergic response to materials, foods or animals at this time.    Past Medical, Family and Social History: The patient's past medical, family and social history have been reviewed and updated as appropriate within the electronic medical record - see encounter notes.    Compliance: yes    Side effects: None    Risk Parameters:  Patient reports no suicidal ideation  Patient reports no homicidal ideation  Patient reports no self-injurious behavior  Patient reports no violent behavior    Exam (detailed: at least 9 elements; comprehensive: all 15 elements)   Constitutional  Vitals:  Most recent vital signs, dated less than 90 days prior to this appointment, were reviewed.   Vitals:    04/03/18 0943   BP: (!) 158/79   Pulse: 60   Weight: 125.7 kg (277 lb 1.9 oz)        General:  unremarkable, age appropriate     Musculoskeletal  Muscle Strength/Tone:  no dystonia, no tremor, no tic   Gait & Station:  non-ataxic     Psychiatric  Speech:  no latency; no press   Mood & Affect:  dysthymic, irritable  irritable   Thought  Process:  normal and logical   Associations:  intact   Thought Content:  normal, no suicidality, no homicidality, delusions, or paranoia   Insight:  has awareness of illness   Judgement: behavior is adequate to circumstances, age appropriate   Orientation:  grossly intact, person, place, situation, time/date   Memory: intact for content of interview, able to remember recent events- yes, able to remember remote events- yes   Language: grossly intact   Attention Span & Concentration:  able to focus   Fund of Knowledge:  intact and appropriate to age and level of education, familiar with aspects of current personal life     Assessment and Diagnosis   Status/Progress: Based on the examination today, the patient's problem(s) is/are adequately but not ideally controlled.  New problems have not been presented today.   Co-morbidities and Lack of compliance are not complicating management of the primary condition.  There are no active rule-out diagnoses for this patient at this time.     General Impression:       ICD-10-CM ICD-9-CM   1. Anxiety F41.9 300.00   2. Depression, major, recurrent, moderate F33.1 296.32   3. Insomnia, unspecified type G47.00 780.52       Intervention/Counseling/Treatment Plan   · Medication Management: The risks and benefits of medication were discussed with the patient.   · Continue Cymbalta 60 mg po BID  · Continue Abilify 5 mg po daily  · Continue Ambien 10 mg po q hs PRN insomnia  · Trial of Atarx (hydroxyzine) 50 mg po q hs PRN insomnia  · Continue Klonopin 1 mg po daily PRN severe anxiety  · Continue individual therapy with Dr. Rosales    Return to Clinic: 3 months

## 2018-04-03 NOTE — PROGRESS NOTES
"INR subtherapeutic today. Patient states that she was instructed by Dr. Avelino Mejia to stop coumadin and start eliquis. As per MD note: "Patient getting INR checked today and is meeting with coumadin clinic pharmacist. Anticipate stopping warfarin and then starting eliquis in 3 days unless INR today is significantly supratherapeutic. If so recommend repeating INR in 3 days prior to starting eliquis". In order for the patient to convert from coumadin to eliquis, patient's INR must be <2.0. Since patient's INR is 1.4 today she is able to stop coumadin today and start Eliquis. Advised patient to call her doctor and notify him of her current INR and be advised when to start Eliquis. Patient states that she understands. She denies any bleeding, bruising or other changes that affect warfarin therapy.   "

## 2018-04-09 ENCOUNTER — TELEPHONE (OUTPATIENT)
Dept: CARDIOLOGY | Facility: CLINIC | Age: 53
End: 2018-04-09

## 2018-04-09 ENCOUNTER — TELEPHONE (OUTPATIENT)
Dept: ELECTROPHYSIOLOGY | Facility: CLINIC | Age: 53
End: 2018-04-09

## 2018-04-09 NOTE — TELEPHONE ENCOUNTER
,       LOV:2/22/18 with you and Hansel NP.The pt said that on Sat. She had an episode of C/P that occurred while sitting in a chair.She said that the pain was really bad and rated the pain at a 10 ( being the worst, on the pain scale).She said it lasted 5-7 min,denies nausea but said she did have some dizziness and light-headedness .States she is not sure what her b/p has been b/c she does not have a means of checking it.She denies SOB or swelling.The pain has not recurred and she feels fine since the episode on Sat.She said that she did call the Arrhythmia doctor On-Call b/c she thought her AICD may have shocked her .She called Arrhythmia this morning and they assured her that her ICD did not show any shocks to her and she was told to call her general Cardiologist.Pt said she feels fine Please advise.Ildefonso,Leora

## 2018-04-09 NOTE — TELEPHONE ENCOUNTER
----- Message from Sosa Edwards sent at 4/9/2018  8:22 AM CDT -----  Contact: patient  Please call pt at [phone]. Patient had a sharp chest pain over the weekend with SOB. Patient is ok at present. Last seen RUFUS Wang on 02/22/18    Thank you

## 2018-04-09 NOTE — TELEPHONE ENCOUNTER
"Patient called stating she thinks she was shocked by her AICD over the weekend. She has a [employer] ICD. I follow her through remote home monitoring. I checked the web site and it did not show any shocks, ATP or Arrhythmias over the weekend. I reviewed the site once more with the device RN who agreed.  I informed her that she did not get shocked. She told me that "It felt more like a deep strong fast chest pain." I reassured her that it was not her ICD shocking her. I asked her to call her Cardiologist, FOREST Vang and inform her of her chest pain over the weekend. Patient stated understanding.   "

## 2018-04-10 ENCOUNTER — PATIENT MESSAGE (OUTPATIENT)
Dept: ADMINISTRATIVE | Facility: OTHER | Age: 53
End: 2018-04-10

## 2018-04-11 ENCOUNTER — HOSPITAL ENCOUNTER (OUTPATIENT)
Facility: HOSPITAL | Age: 53
Discharge: HOME | End: 2018-04-12
Attending: EMERGENCY MEDICINE | Admitting: PSYCHIATRY & NEUROLOGY
Payer: MEDICARE

## 2018-04-11 ENCOUNTER — TELEPHONE (OUTPATIENT)
Dept: ELECTROPHYSIOLOGY | Facility: CLINIC | Age: 53
End: 2018-04-11

## 2018-04-11 DIAGNOSIS — Z95.810 PRESENCE OF AUTOMATIC IMPLANTABLE CARDIOVERTER-DEFIBRILLATOR: ICD-10-CM

## 2018-04-11 DIAGNOSIS — I10 ESSENTIAL HYPERTENSION: ICD-10-CM

## 2018-04-11 DIAGNOSIS — I63.412 EMBOLIC STROKE INVOLVING LEFT MIDDLE CEREBRAL ARTERY: Primary | ICD-10-CM

## 2018-04-11 DIAGNOSIS — I63.9 CEREBROVASCULAR ACCIDENT (CVA), UNSPECIFIED MECHANISM: ICD-10-CM

## 2018-04-11 DIAGNOSIS — I63.9 STROKE: ICD-10-CM

## 2018-04-11 DIAGNOSIS — E87.1 HYPONATREMIA: ICD-10-CM

## 2018-04-11 PROBLEM — I51.7 LEFT ATRIAL ENLARGEMENT: Status: ACTIVE | Noted: 2018-04-11

## 2018-04-11 LAB
ALBUMIN SERPL BCP-MCNC: 3.2 G/DL
ALP SERPL-CCNC: 69 U/L
ALT SERPL W/O P-5'-P-CCNC: 28 U/L
ANION GAP SERPL CALC-SCNC: 4 MMOL/L
AST SERPL-CCNC: 20 U/L
BASOPHILS # BLD AUTO: 0.01 K/UL
BASOPHILS NFR BLD: 0.3 %
BILIRUB SERPL-MCNC: 0.4 MG/DL
BILIRUB UR QL STRIP: NEGATIVE
BUN SERPL-MCNC: 11 MG/DL
CALCIUM SERPL-MCNC: 9.5 MG/DL
CHLORIDE SERPL-SCNC: 105 MMOL/L
CHOLEST SERPL-MCNC: 159 MG/DL
CHOLEST/HDLC SERPL: 4.2 {RATIO}
CK MB SERPL-MCNC: 1.8 NG/ML
CK MB SERPL-RTO: 1.4 %
CK SERPL-CCNC: 127 U/L
CLARITY UR REFRACT.AUTO: CLEAR
CO2 SERPL-SCNC: 26 MMOL/L
COLOR UR AUTO: YELLOW
CREAT SERPL-MCNC: 0.8 MG/DL (ref 0.5–1.4)
CREAT SERPL-MCNC: 1 MG/DL
DIFFERENTIAL METHOD: ABNORMAL
EOSINOPHIL # BLD AUTO: 0 K/UL
EOSINOPHIL NFR BLD: 0.5 %
ERYTHROCYTE [DISTWIDTH] IN BLOOD BY AUTOMATED COUNT: 15.7 %
EST. GFR  (AFRICAN AMERICAN): >60 ML/MIN/1.73 M^2
EST. GFR  (NON AFRICAN AMERICAN): >60 ML/MIN/1.73 M^2
ESTIMATED AVG GLUCOSE: 114 MG/DL
GLUCOSE SERPL-MCNC: 120 MG/DL
GLUCOSE SERPL-MCNC: 136 MG/DL (ref 70–110)
GLUCOSE UR QL STRIP: NEGATIVE
HBA1C MFR BLD HPLC: 5.6 %
HCT VFR BLD AUTO: 35.9 %
HDLC SERPL-MCNC: 38 MG/DL
HDLC SERPL: 23.9 %
HGB BLD-MCNC: 12.2 G/DL
HGB UR QL STRIP: NEGATIVE
IMM GRANULOCYTES # BLD AUTO: 0 K/UL
IMM GRANULOCYTES NFR BLD AUTO: 0 %
INR PPP: 1
KETONES UR QL STRIP: NEGATIVE
LDLC SERPL CALC-MCNC: 95.2 MG/DL
LEUKOCYTE ESTERASE UR QL STRIP: NEGATIVE
LYMPHOCYTES # BLD AUTO: 1.6 K/UL
LYMPHOCYTES NFR BLD: 42.7 %
MCH RBC QN AUTO: 30.4 PG
MCHC RBC AUTO-ENTMCNC: 34 G/DL
MCV RBC AUTO: 90 FL
MONOCYTES # BLD AUTO: 0.4 K/UL
MONOCYTES NFR BLD: 10.3 %
NEUTROPHILS # BLD AUTO: 1.7 K/UL
NEUTROPHILS NFR BLD: 46.2 %
NITRITE UR QL STRIP: NEGATIVE
NONHDLC SERPL-MCNC: 121 MG/DL
NRBC BLD-RTO: 0 /100 WBC
PH UR STRIP: 5 [PH] (ref 5–8)
PLATELET # BLD AUTO: 210 K/UL
PMV BLD AUTO: 11.7 FL
POC PTINR: 1 (ref 0.9–1.2)
POC PTWBT: 12.3 SEC (ref 9.7–14.3)
POTASSIUM SERPL-SCNC: 3.6 MMOL/L
PROT SERPL-MCNC: 9.3 G/DL
PROT UR QL STRIP: NEGATIVE
PROTHROMBIN TIME: 10.5 SEC
RBC # BLD AUTO: 4.01 M/UL
SAMPLE: NORMAL
SAMPLE: NORMAL
SODIUM SERPL-SCNC: 135 MMOL/L
SP GR UR STRIP: 1.01 (ref 1–1.03)
TRIGL SERPL-MCNC: 129 MG/DL
TROPONIN I SERPL DL<=0.01 NG/ML-MCNC: 0.01 NG/ML
TSH SERPL DL<=0.005 MIU/L-ACNC: 0.74 UIU/ML
URN SPEC COLLECT METH UR: NORMAL
UROBILINOGEN UR STRIP-ACNC: NEGATIVE EU/DL
WBC # BLD AUTO: 3.7 K/UL

## 2018-04-11 PROCEDURE — 63600175 PHARM REV CODE 636 W HCPCS: Performed by: EMERGENCY MEDICINE

## 2018-04-11 PROCEDURE — 99285 EMERGENCY DEPT VISIT HI MDM: CPT | Mod: ,,, | Performed by: EMERGENCY MEDICINE

## 2018-04-11 PROCEDURE — 84443 ASSAY THYROID STIM HORMONE: CPT

## 2018-04-11 PROCEDURE — 82553 CREATINE MB FRACTION: CPT

## 2018-04-11 PROCEDURE — G0378 HOSPITAL OBSERVATION PER HR: HCPCS

## 2018-04-11 PROCEDURE — 96374 THER/PROPH/DIAG INJ IV PUSH: CPT

## 2018-04-11 PROCEDURE — 93010 ELECTROCARDIOGRAM REPORT: CPT | Mod: ,,, | Performed by: INTERNAL MEDICINE

## 2018-04-11 PROCEDURE — 99285 EMERGENCY DEPT VISIT HI MDM: CPT | Mod: 25

## 2018-04-11 PROCEDURE — S0028 INJECTION, FAMOTIDINE, 20 MG: HCPCS | Performed by: EMERGENCY MEDICINE

## 2018-04-11 PROCEDURE — 25000003 PHARM REV CODE 250: Performed by: STUDENT IN AN ORGANIZED HEALTH CARE EDUCATION/TRAINING PROGRAM

## 2018-04-11 PROCEDURE — 83036 HEMOGLOBIN GLYCOSYLATED A1C: CPT

## 2018-04-11 PROCEDURE — 85025 COMPLETE CBC W/AUTO DIFF WBC: CPT

## 2018-04-11 PROCEDURE — 81003 URINALYSIS AUTO W/O SCOPE: CPT

## 2018-04-11 PROCEDURE — 99218 PR INITIAL OBSERVATION CARE,LEVL I: CPT | Mod: GC,,, | Performed by: PSYCHIATRY & NEUROLOGY

## 2018-04-11 PROCEDURE — 82565 ASSAY OF CREATININE: CPT

## 2018-04-11 PROCEDURE — 93284 PRGRMG EVAL IMPLANTABLE DFB: CPT | Mod: 26,,, | Performed by: INTERNAL MEDICINE

## 2018-04-11 PROCEDURE — 93284 PRGRMG EVAL IMPLANTABLE DFB: CPT

## 2018-04-11 PROCEDURE — 80053 COMPREHEN METABOLIC PANEL: CPT

## 2018-04-11 PROCEDURE — 96375 TX/PRO/DX INJ NEW DRUG ADDON: CPT | Mod: 59

## 2018-04-11 PROCEDURE — 85610 PROTHROMBIN TIME: CPT

## 2018-04-11 PROCEDURE — 25500020 PHARM REV CODE 255: Performed by: EMERGENCY MEDICINE

## 2018-04-11 PROCEDURE — 84484 ASSAY OF TROPONIN QUANT: CPT

## 2018-04-11 PROCEDURE — 82550 ASSAY OF CK (CPK): CPT

## 2018-04-11 PROCEDURE — A4216 STERILE WATER/SALINE, 10 ML: HCPCS | Performed by: STUDENT IN AN ORGANIZED HEALTH CARE EDUCATION/TRAINING PROGRAM

## 2018-04-11 PROCEDURE — 82962 GLUCOSE BLOOD TEST: CPT

## 2018-04-11 PROCEDURE — 25000003 PHARM REV CODE 250: Performed by: PHYSICIAN ASSISTANT

## 2018-04-11 PROCEDURE — 25000003 PHARM REV CODE 250: Performed by: EMERGENCY MEDICINE

## 2018-04-11 PROCEDURE — 80061 LIPID PANEL: CPT

## 2018-04-11 RX ORDER — ARIPIPRAZOLE 5 MG/1
5 TABLET ORAL NIGHTLY
Status: DISCONTINUED | OUTPATIENT
Start: 2018-04-11 | End: 2018-04-12 | Stop reason: HOSPADM

## 2018-04-11 RX ORDER — FAMOTIDINE 10 MG/ML
20 INJECTION INTRAVENOUS
Status: COMPLETED | OUTPATIENT
Start: 2018-04-11 | End: 2018-04-11

## 2018-04-11 RX ORDER — ATORVASTATIN CALCIUM 20 MG/1
40 TABLET, FILM COATED ORAL EVERY MORNING
Status: DISCONTINUED | OUTPATIENT
Start: 2018-04-12 | End: 2018-04-12 | Stop reason: HOSPADM

## 2018-04-11 RX ORDER — SODIUM CHLORIDE 0.9 % (FLUSH) 0.9 %
3 SYRINGE (ML) INJECTION EVERY 8 HOURS
Status: DISCONTINUED | OUTPATIENT
Start: 2018-04-11 | End: 2018-04-12 | Stop reason: HOSPADM

## 2018-04-11 RX ORDER — ONDANSETRON 8 MG/1
8 TABLET, ORALLY DISINTEGRATING ORAL EVERY 8 HOURS PRN
Status: DISCONTINUED | OUTPATIENT
Start: 2018-04-11 | End: 2018-04-12 | Stop reason: HOSPADM

## 2018-04-11 RX ORDER — DULOXETIN HYDROCHLORIDE 60 MG/1
60 CAPSULE, DELAYED RELEASE ORAL 2 TIMES DAILY
Status: DISCONTINUED | OUTPATIENT
Start: 2018-04-11 | End: 2018-04-12 | Stop reason: HOSPADM

## 2018-04-11 RX ORDER — NICARDIPINE HYDROCHLORIDE 0.2 MG/ML
5 INJECTION INTRAVENOUS CONTINUOUS
Status: DISCONTINUED | OUTPATIENT
Start: 2018-04-11 | End: 2018-04-11

## 2018-04-11 RX ORDER — TIZANIDINE 2 MG/1
8 TABLET ORAL DAILY PRN
Status: DISCONTINUED | OUTPATIENT
Start: 2018-04-11 | End: 2018-04-12 | Stop reason: HOSPADM

## 2018-04-11 RX ORDER — DIPHENHYDRAMINE HYDROCHLORIDE 50 MG/ML
25 INJECTION INTRAMUSCULAR; INTRAVENOUS
Status: COMPLETED | OUTPATIENT
Start: 2018-04-11 | End: 2018-04-11

## 2018-04-11 RX ORDER — DESIPRAMINE HYDROCHLORIDE 25 MG/1
25 TABLET ORAL DAILY
Status: DISCONTINUED | OUTPATIENT
Start: 2018-04-12 | End: 2018-04-12 | Stop reason: HOSPADM

## 2018-04-11 RX ORDER — GABAPENTIN 300 MG/1
900 CAPSULE ORAL 3 TIMES DAILY
Status: DISCONTINUED | OUTPATIENT
Start: 2018-04-11 | End: 2018-04-12 | Stop reason: HOSPADM

## 2018-04-11 RX ORDER — OXYCODONE HYDROCHLORIDE 5 MG/1
5 TABLET ORAL EVERY 8 HOURS PRN
Status: DISCONTINUED | OUTPATIENT
Start: 2018-04-11 | End: 2018-04-12 | Stop reason: HOSPADM

## 2018-04-11 RX ORDER — PANTOPRAZOLE SODIUM 40 MG/1
40 TABLET, DELAYED RELEASE ORAL DAILY
Status: DISCONTINUED | OUTPATIENT
Start: 2018-04-12 | End: 2018-04-12

## 2018-04-11 RX ORDER — DIPHENHYDRAMINE HYDROCHLORIDE 50 MG/ML
25 INJECTION INTRAMUSCULAR; INTRAVENOUS
Status: DISCONTINUED | OUTPATIENT
Start: 2018-04-11 | End: 2018-04-11

## 2018-04-11 RX ORDER — METHYLPREDNISOLONE SOD SUCC 125 MG
125 VIAL (EA) INJECTION EVERY 6 HOURS
Status: DISCONTINUED | OUTPATIENT
Start: 2018-04-11 | End: 2018-04-11

## 2018-04-11 RX ORDER — METHYLPREDNISOLONE SOD SUCC 125 MG
125 VIAL (EA) INJECTION ONCE
Status: COMPLETED | OUTPATIENT
Start: 2018-04-11 | End: 2018-04-11

## 2018-04-11 RX ORDER — ACETAMINOPHEN 325 MG/1
650 TABLET ORAL EVERY 6 HOURS PRN
Status: DISCONTINUED | OUTPATIENT
Start: 2018-04-11 | End: 2018-04-12 | Stop reason: HOSPADM

## 2018-04-11 RX ORDER — DONEPEZIL HYDROCHLORIDE 10 MG/1
10 TABLET, FILM COATED ORAL 2 TIMES DAILY
Status: DISCONTINUED | OUTPATIENT
Start: 2018-04-11 | End: 2018-04-12 | Stop reason: HOSPADM

## 2018-04-11 RX ORDER — LABETALOL HYDROCHLORIDE 5 MG/ML
10 INJECTION, SOLUTION INTRAVENOUS
Status: DISCONTINUED | OUTPATIENT
Start: 2018-04-11 | End: 2018-04-12 | Stop reason: HOSPADM

## 2018-04-11 RX ADMIN — ARIPIPRAZOLE 5 MG: 5 TABLET ORAL at 08:04

## 2018-04-11 RX ADMIN — METHYLPREDNISOLONE SODIUM SUCCINATE 125 MG: 125 INJECTION, POWDER, FOR SOLUTION INTRAMUSCULAR; INTRAVENOUS at 04:04

## 2018-04-11 RX ADMIN — OXYCODONE HYDROCHLORIDE 5 MG: 5 TABLET ORAL at 08:04

## 2018-04-11 RX ADMIN — TIZANIDINE 8 MG: 2 TABLET ORAL at 08:04

## 2018-04-11 RX ADMIN — GABAPENTIN 900 MG: 300 CAPSULE ORAL at 08:04

## 2018-04-11 RX ADMIN — ACETAMINOPHEN 650 MG: 325 TABLET, FILM COATED ORAL at 05:04

## 2018-04-11 RX ADMIN — Medication 3 ML: at 10:04

## 2018-04-11 RX ADMIN — DIPHENHYDRAMINE HYDROCHLORIDE 25 MG: 50 INJECTION, SOLUTION INTRAMUSCULAR; INTRAVENOUS at 04:04

## 2018-04-11 RX ADMIN — APIXABAN 5 MG: 5 TABLET, FILM COATED ORAL at 08:04

## 2018-04-11 RX ADMIN — DONEPEZIL HYDROCHLORIDE 10 MG: 10 TABLET, FILM COATED ORAL at 08:04

## 2018-04-11 RX ADMIN — FAMOTIDINE 20 MG: 10 INJECTION INTRAVENOUS at 04:04

## 2018-04-11 RX ADMIN — IOHEXOL 75 ML: 350 INJECTION, SOLUTION INTRAVENOUS at 04:04

## 2018-04-11 RX ADMIN — DULOXETINE 60 MG: 60 CAPSULE, DELAYED RELEASE ORAL at 08:04

## 2018-04-11 NOTE — ASSESSMENT & PLAN NOTE
Will need to taper desipramine slowly (given new AIS) so as to avoid withdrawal symptoms    Ok to continue Cymbalta

## 2018-04-11 NOTE — TELEPHONE ENCOUNTER
"Patient contacted the Device Clinic on this afternoon with c/o "went into A-fib, was really dizzy and felt nauseated" and this lasted "six minutes".  Asked the patient how was she currently feeling.  Patient stated, " My heart is not in A-fib anymore, but I still feel really bad".  Asked the patient if she could elaborate/describe what/how she was feeling.  Patient again stated she just feels bad, really bad.  Informed her I would have the nurse call her, patient then stated, " I'm just going to have my daughter bring me to the emergency room".  I was then unable to ask or say anything else as to she again stated she was just going to go to the ED.  A FYI message was sent to Dr. Mejia' nurse.   "

## 2018-04-11 NOTE — ASSESSMENT & PLAN NOTE
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     Will obtain CTA H/N for look for atherosclerosis for alternative etiology of AIS.  However, if AIS is thought to be cardioembolic 2/2 known paroxysmal afib, consider continuing Eliquis versus switching to alternative NOAC.

## 2018-04-11 NOTE — H&P
[facility]  Vascular Neurology  Comprehensive Stroke Center  History & Physical    Inpatient consult to Vascular (Stroke) Neurology  Consult performed by: GLORIA GARCIA  Consult ordered by: KEN OWENS        Assessment/Plan:     Patient is a 52 y.o. year old female with:    Embolic stroke involving left middle cerebral artery    Suspected.  Likely cardioembolic, given known afib (on Eliquis).  However, CTA H/N to r/o stenosis/occlusion of carotids/intracranial vessels as alternative etiology of AIS.      Antithrombotics for secondary stroke prevention: N/A Anticoagulants: Apixaban 5 mg BID     Statins for secondary stroke prevention and hyperlipidemia, if present:   Statins: Atorvastatin- 40 mg daily at home.  Consider increasing to atorvastatin 80mg daily for LDL goal<70 if significant atherosclerosis found on CTA.    Aggressive risk factor modification: HTN, HLD, Diet, Exercise, Obesity, A-Fib, CAD     Rehab efforts: PT/OT/SLP to evaluate and treat    Diagnostics ordered/pending: CTA Head to assess vasculature , CTA Neck/Arch to assess vasculature, TTE to assess cardiac function/status     VTE prophylaxis: None: Reason for No Pharmacological VTE Prophylaxis: Currently on anticoagulation    BP parameters: Infarct: No intervention, SBP <220        Essential hypertension    Stroke RF  SBP goal <220 today        HLD (hyperlipidemia)    Stroke RF  LDL 95.2 today; on atorvastatin 40mg daily.  See embolic stroke, above.        Left atrial enlargement    As seen on TTE 2/22/2018  Known afib.  On Eliquis.  No need for repeat TTE at this time.        Pacemaker    / ICD in situ   Leads are not MRI compatible        Chronic migraine without aura, with intractable migraine, so stated, with status migrainosus    Please avoid triptans in the future, given AIS.        Depression, major, recurrent, moderate    Consider outpatient taper of desipramine, given AIS  Ok to continue Cymbalta         Paroxysmal atrial fibrillation    Likely cardioembolic etiology of AIS  On Eliquis 5mg bid.  Ok to continue tonight, given that AIS is suspected to be small territory.   Will obtain CTA H/N for look for atherosclerosis for alternative etiology of AIS.  However, if AIS is thought to be cardioembolic 2/2 known paroxysmal afib, consider continuing Eliquis versus switching to alternative NOAC.            STROKE DOCUMENTATION          NIH Scale:  Interval: baseline (upon arrival/admit)  1a. Level Of Consciousness: 0-->Alert: keenly responsive  1b. LOC Questions: 0-->Answers both questions correctly  1c. LOC Commands: 0-->Performs both tasks correctly  2. Best Gaze: 0-->Normal  3. Visual: 0-->No visual loss  4. Facial Palsy: 0-->Normal symmetrical movements  5a. Motor Arm, Left: 0-->No drift: limb holds 90 (or 45) degrees for full 10 secs  5b. Motor Arm, Right: 1-->Drift: limb holds 90 (or 45) degrees, but drifts down before full 10 secs: does not hit bed or other support  6a. Motor Leg, Left: 0-->No drift: leg holds 30 degree position for full 5 secs  6b. Motor Leg, Right: 1-->Drift: leg falls by the end of the 5-sec period but does not hit bed  7. Limb Ataxia: 0-->Absent  8. Sensory: 1-->Mild-to-moderate sensory loss: patient feels pinprick is less sharp or is dull on the affected side: or there is a loss of superficial pain with pinprick, but patient is aware of being touched  9. Best Language: 0-->No aphasia: normal  10. Dysarthria: 0-->Normal  11. Extinction and Inattention (formerly Neglect): 0-->No abnormality  Total (NIH Stroke Scale): 3     Modified [place] Score: 4 (uses a wheelchair or walker at baseline)  Brian Coma Scale:15   ABCD2 Score:    [id number] Score:6  HAS -BLED Score:   ICH Score:   [employer] Classification:      Thrombolysis Candidate? No, Current use of direct thrombin inhibitors (dabigatran) or direct factor Xa inhibitors (rivaroxaban, apixaban, edoxaban) with elevated sensitive laboratory  tests       Interventional Revascularization Candidate?   Is the patient eligible for mechanical endovascular reperfusion (BRYAN)?  No; No large vessel occlusion    Hemorrhagic change of an Ischemic Stroke: Does this patient have an ischemic stroke with hemorrhagic changes? No         Subjective:     History of Present Illness:  Ms. Chawla is a 51 yo F with afib on Eliquis (last dose this am), prior cardiac arrest with associated AIS with residual mild L sided weakness, CAD with ICD in situ, HTN, and HLD who presented with acute R sided weakness and sensation changes.  She originally called EMS for palpitations, but developed acute right sided facial droop and RUE weakness at 1:40pm today, after EMS had arrived.  She denies changes to speech or vision.  SBP en route 200/100.  She is ineligible for tPA 2/2 Eliquis use.  She is not suspected to have an LVO.  She will be admitted to  for observation overnight.          Past Medical History:   Diagnosis Date    Anxiety     Asthma     Brain anoxic injury     Coronary artery disease     Defibrillator activation 2013    Depression     History of sudden cardiac arrest 2/2013    PEA arrest with subsequent long-QT    Hx of psychiatric care     Hypertension     Pacemaker 2013    Psychiatric problem     Seizures     Sleep difficulties     Stroke     weakness lt side    Therapy      Past Surgical History:   Procedure Laterality Date    breast reduction      BREAST SURGERY      CARDIAC DEFIBRILLATOR PLACEMENT      CARDIAC DEFIBRILLATOR PLACEMENT      CARPAL TUNNEL RELEASE Right     COSMETIC SURGERY      HERNIA REPAIR      HIATAL HERNIA REPAIR      INSERT / REPLACE / REMOVE PACEMAKER      TUBAL LIGATION       Family History   Problem Relation Age of Onset    Hypertension Mother     COPD      Heart failure      Glaucoma Maternal Grandmother     Glaucoma Paternal Grandmother     Melanoma Neg Hx      Social History   Substance Use Topics    Smoking  status: Never Smoker    Smokeless tobacco: Never Used    Alcohol use No     Review of patient's allergies indicates:   Allergen Reactions    Aspirin Hives    Imitrex [sumatriptan] Palpitations    Nsaids (non-steroidal anti-inflammatory drug) Shortness Of Breath    Penicillins Hives and Swelling    Shellfish containing products Anaphylaxis     seafood    Percocet [oxycodone-acetaminophen] Itching     States can take    Reglan [metoclopramide hcl] Other (See Comments)     Parkinsonism        Medications: I have reviewed the current medication administration record.      (Not in a hospital admission)    Review of Systems  Objective:     Vital Signs (Most Recent):  Temp: 98.8 °F (37.1 °C) (04/11/18 1403)  Pulse: 82 (04/11/18 1517)  Resp: (!) 24 (04/11/18 1505)  BP: (!) 168/82 (04/11/18 1517)  SpO2: 99 % (04/11/18 1517)    Vital Signs Range (Last 24H):  Temp:  [98.8 °F (37.1 °C)]   Pulse:  [81-83]   Resp:  [16-24]   BP: (168-221)/()   SpO2:  [99 %-100 %]     Physical Exam    Neurological Exam:   LOC: alert  Attention Span: Good   Language: No aphasia  Articulation: No dysarthria  Orientation: Person, Place, Time   Visual Fields: Full  EOM (CN III, IV, VI): Full/intact  Facial Sensation (CN V): Facial sensory loss  Motor: RUE 4/5 elbow flexion and extension.  LUE 4+/5 flexion and extension.  RLE 4+/5 knee flexion.  LLE 5/5 knee flexion  Tone: Normal tone throughout      Laboratory:  CMP:   Recent Labs  Lab 04/11/18  1436   CALCIUM 9.5   ALBUMIN 3.2*   PROT 9.3*   *   K 3.6   CO2 26      BUN 11   CREATININE 1.0   ALKPHOS 69   ALT 28   AST 20   BILITOT 0.4     CBC:   Recent Labs  Lab 04/11/18  1436   WBC 3.70*   RBC 4.01   HGB 12.2   HCT 35.9*      MCV 90   MCH 30.4   MCHC 34.0     Lipid Panel:   Recent Labs  Lab 04/11/18  1436   CHOL 159   LDLCALC 95.2   HDL 38*   TRIG 129     Coagulation:   Recent Labs  Lab 04/11/18  1436   INR 1.0     Hgb A1C: No results for input(s): HGBA1C in the last  168 hours.  TSH: No results for input(s): TSH in the last 168 hours.    Diagnostic Results:      Brain imaging:  CTH 4/11/18 negative for bleed or early ischemic changes.      Vessel Imaging:  pending    Cardiac Evaluation:   TTE 2/22/18  1 - Mildly depressed left ventricular systolic function (EF 45-50%).     2 - Normal right ventricular systolic function .     3 - Indeterminate LV diastolic function.     4 - Moderate left atrial enlargement.         Vanita Freeman MD  Comprehensive Stroke Center  Department of Vascular Neurology   [facility]

## 2018-04-11 NOTE — HPI
Ms. Chawla is a 53 yo F with afib on Eliquis (last dose this am), prior cardiac arrest with associated acute ischemic stroke with residual mild L sided weakness, CAD with ICD in situ, HTN, and HLD who presented with acute R sided weakness and sensation changes.  She originally called EMS for palpitations, but developed acute right sided facial droop and RUE weakness at 1:40pm today, after EMS had arrived.  She denies changes to speech or vision.  SBP en route 200/100.  She is ineligible for tPA 2/2 Eliquis use.  She is not suspected to have an LVO.  She will be admitted to  for observation overnight.

## 2018-04-11 NOTE — SUBJECTIVE & OBJECTIVE
Past Medical History:   Diagnosis Date    Anxiety     Asthma     Brain anoxic injury     Coronary artery disease     Defibrillator activation 2013    Depression     History of sudden cardiac arrest 2/2013    PEA arrest with subsequent long-QT    Hx of psychiatric care     Hypertension     Pacemaker 2013    Psychiatric problem     Seizures     Sleep difficulties     Stroke     weakness lt side    Therapy      Past Surgical History:   Procedure Laterality Date    breast reduction      BREAST SURGERY      CARDIAC DEFIBRILLATOR PLACEMENT      CARDIAC DEFIBRILLATOR PLACEMENT      CARPAL TUNNEL RELEASE Right     COSMETIC SURGERY      HERNIA REPAIR      HIATAL HERNIA REPAIR      INSERT / REPLACE / REMOVE PACEMAKER      TUBAL LIGATION       Family History   Problem Relation Age of Onset    Hypertension Mother     COPD      Heart failure      Glaucoma Maternal Grandmother     Glaucoma Paternal Grandmother     Melanoma Neg Hx      Social History   Substance Use Topics    Smoking status: Never Smoker    Smokeless tobacco: Never Used    Alcohol use No     Review of patient's allergies indicates:   Allergen Reactions    Aspirin Hives    Imitrex [sumatriptan] Palpitations    Nsaids (non-steroidal anti-inflammatory drug) Shortness Of Breath    Penicillins Hives and Swelling    Shellfish containing products Anaphylaxis     seafood    Percocet [oxycodone-acetaminophen] Itching     States can take    Reglan [metoclopramide hcl] Other (See Comments)     Parkinsonism        Medications: I have reviewed the current medication administration record.      (Not in a hospital admission)    Review of Systems  Objective:     Vital Signs (Most Recent):  Temp: 98.8 °F (37.1 °C) (04/11/18 1403)  Pulse: 82 (04/11/18 1517)  Resp: (!) 24 (04/11/18 1505)  BP: (!) 168/82 (04/11/18 1517)  SpO2: 99 % (04/11/18 1517)    Vital Signs Range (Last 24H):  Temp:  [98.8 °F (37.1 °C)]   Pulse:  [81-83]   Resp:   [16-24]   BP: (168-221)/()   SpO2:  [99 %-100 %]     Physical Exam    Neurological Exam:   LOC: alert  Attention Span: Good   Language: No aphasia  Articulation: No dysarthria  Orientation: Person, Place, Time   Visual Fields: Full  EOM (CN III, IV, VI): Full/intact  Facial Sensation (CN V): Facial sensory loss  Motor: RUE 4/5 elbow flexion and extension.  LUE 4+/5 flexion and extension.  RLE 4+/5 knee flexion.  LLE 5/5 knee flexion  Tone: Normal tone throughout      Laboratory:  CMP:   Recent Labs  Lab 04/11/18  1436   CALCIUM 9.5   ALBUMIN 3.2*   PROT 9.3*   *   K 3.6   CO2 26      BUN 11   CREATININE 1.0   ALKPHOS 69   ALT 28   AST 20   BILITOT 0.4     CBC:   Recent Labs  Lab 04/11/18  1436   WBC 3.70*   RBC 4.01   HGB 12.2   HCT 35.9*      MCV 90   MCH 30.4   MCHC 34.0     Lipid Panel:   Recent Labs  Lab 04/11/18  1436   CHOL 159   LDLCALC 95.2   HDL 38*   TRIG 129     Coagulation:   Recent Labs  Lab 04/11/18  1436   INR 1.0     Hgb A1C: No results for input(s): HGBA1C in the last 168 hours.  TSH: No results for input(s): TSH in the last 168 hours.    Diagnostic Results:      Brain imaging:  CT 4/11/18 negative for bleed or early ischemic changes.      Vessel Imaging:  pending    Cardiac Evaluation:   TTE 2/22/18  1 - Mildly depressed left ventricular systolic function (EF 45-50%).     2 - Normal right ventricular systolic function .     3 - Indeterminate LV diastolic function.     4 - Moderate left atrial enlargement.

## 2018-04-11 NOTE — ED PROVIDER NOTES
"Encounter Date: 4/11/2018    SCRIBE #1 NOTE: I, Lillie Bowen, am scribing for, and in the presence of,  Dr. Mayorga. I have scribed the following portions of the note - Other sections scribed: TABATHA MADDEN.       History     Chief Complaint   Patient presents with    Weakness     right sided with headache that began at 1340 after EMS arrival to house. Patient originally called EMS for "feeling like my heart is racing."  Hx: left sided deficits from previous CVA     Time patient was seen by the provider: 2:17 PM      The patient is a 52 y.o. female with co-morbidities including: asthma, HTN, CAD, pacemaker, seizures, A-fib and a PMHx of stroke who presents to the ED with a complaint of right upper and lower extremity weakness, chest pain and intermittent headache. Pt has a hx of a left-sided stroke in 2013 with residual left sided weakness. Pt woke up this morning with dizziness and light headedness. Pt then reports an episode of chest pain at 1:00 PM today followed by a headache located behind her right eye that she rates at a 10/10.  She additionally endorses nausea. Pt denies speech difficulty, visual disturbance, fever, recent trauma, sick contact, tylenol or motrin use. Pt regularly takes gabapentin, Topamax, carvedilol and valsartan.      The history is provided by the patient and medical records.     Review of patient's allergies indicates:   Allergen Reactions    Aspirin Hives    Imitrex [sumatriptan] Palpitations    Nsaids (non-steroidal anti-inflammatory drug) Shortness Of Breath    Penicillins Hives and Swelling    Shellfish containing products Anaphylaxis     seafood    Percocet [oxycodone-acetaminophen] Itching     States can take    Reglan [metoclopramide hcl] Other (See Comments)     Parkinsonism      Past Medical History:   Diagnosis Date    Anxiety     Asthma     Brain anoxic injury     Coronary artery disease     Defibrillator activation 2013    Depression     History of sudden cardiac " arrest 2/2013    PEA arrest with subsequent long-QT    Hx of psychiatric care     Hypertension     Left atrial enlargement 4/11/2018    Pacemaker 2013    Psychiatric problem     Seizures     Sleep difficulties     Stroke     weakness lt side    Therapy      Past Surgical History:   Procedure Laterality Date    breast reduction      BREAST SURGERY      CARDIAC DEFIBRILLATOR PLACEMENT      CARDIAC DEFIBRILLATOR PLACEMENT      CARPAL TUNNEL RELEASE Right     COSMETIC SURGERY      HERNIA REPAIR      HIATAL HERNIA REPAIR      INSERT / REPLACE / REMOVE PACEMAKER      TUBAL LIGATION       Family History   Problem Relation Age of Onset    Hypertension Mother     COPD      Heart failure      Glaucoma Maternal Grandmother     Glaucoma Paternal Grandmother     Melanoma Neg Hx      Social History   Substance Use Topics    Smoking status: Never Smoker    Smokeless tobacco: Never Used    Alcohol use No     Review of Systems   Constitutional: Negative for chills and fever.   HENT: Negative.  Negative for congestion.    Eyes: Negative.  Negative for visual disturbance.   Respiratory: Negative.  Negative for shortness of breath.    Cardiovascular: Positive for chest pain.   Gastrointestinal: Positive for nausea. Negative for vomiting.   Endocrine: Negative.    Genitourinary: Negative.  Negative for difficulty urinating.   Musculoskeletal: Negative.  Negative for myalgias.   Skin: Negative.  Negative for rash.   Neurological: Positive for dizziness, weakness (right sided, chronic left sided), light-headedness and headaches. Negative for speech difficulty.   Hematological: Negative.    Psychiatric/Behavioral: Negative.    All other systems reviewed and are negative.      Physical Exam     Initial Vitals [04/11/18 1403]   BP Pulse Resp Temp SpO2   (!) 202/111 83 16 98.8 °F (37.1 °C) 99 %      MAP       141.33         Physical Exam    Nursing note and vitals reviewed.  Constitutional: She appears  well-developed and well-nourished.   Well-appearing   HENT:   Head: Normocephalic and atraumatic.   Eyes: EOM are normal. Pupils are equal, round, and reactive to light.   Neck: Normal range of motion. Neck supple.   Cardiovascular: Normal rate, regular rhythm and normal heart sounds.   Pulmonary/Chest: Breath sounds normal.   Musculoskeletal: Normal range of motion.   Neurological: She is alert and oriented to person, place, and time. She has normal strength and normal reflexes.   Cranial nerves II-12 intact, decrease sensation right face /right upper extremity, right lower extremity   Skin: Skin is warm. Capillary refill takes less than 2 seconds.   Psychiatric: She has a normal mood and affect. Her behavior is normal. Judgment and thought content normal.         ED Course   Procedures  Labs Reviewed   CBC W/ AUTO DIFFERENTIAL - Abnormal; Notable for the following:        Result Value    WBC 3.70 (*)     Hematocrit 35.9 (*)     RDW 15.7 (*)     Gran # (ANC) 1.7 (*)     All other components within normal limits   COMPREHENSIVE METABOLIC PANEL - Abnormal; Notable for the following:     Sodium 135 (*)     Glucose 120 (*)     Total Protein 9.3 (*)     Albumin 3.2 (*)     Anion Gap 4 (*)     All other components within normal limits   LIPID PANEL - Abnormal; Notable for the following:     HDL 38 (*)     All other components within normal limits   POCT GLUCOSE - Abnormal; Notable for the following:     POC Glucose 136 (*)     All other components within normal limits   PROTIME-INR   TSH    Narrative:     ADD ON TROPONIN ORDER #[id number] AND CKMB ORDER #[id number] PER DR. KEN OWENS  04/11/2018  15:29   ADD ON GHGB PER DR. KEN OWENS AT  04/11/2018  15:36 (CBC)   CK-MB   CK   TROPONIN I   URINALYSIS    Narrative:     If patient is unable to provide a clean catch specimen due to  impairments such as mobility or cognition, obtain straight  cath specimen.   HEMOGLOBIN A1C   TROPONIN I    Narrative:     ADD ON  TROPONIN ORDER #[id number] AND CKMB ORDER #[id number] PER DR. KEN OWENS  04/11/2018  15:29   ADD ON GHGB PER DR. KEN OWENS AT  04/11/2018  15:36 (CBC)   CK-MB    Narrative:     ADD ON TROPONIN ORDER #[id number] AND CKMB ORDER #[id number] PER DR. KEN OWENS  04/11/2018  15:29   ADD ON GHGB PER DR. KEN OWENS AT  04/11/2018  15:36 (CBC)   HEMOGLOBIN A1C    Narrative:     ADD ON TROPONIN ORDER #[id number] AND CKMB ORDER #[id number] PER DR. KEN OWENS  04/11/2018  15:29   ADD ON GHGB PER DR. KEN OWENS AT  04/11/2018  15:36 (CBC)   ISTAT PROCEDURE   ISTAT CREATININE     EKG Readings: (Independently Interpreted)   Initial Reading: No STEMI. Rhythm: Normal Sinus Rhythm. Heart Rate: 80. ST Segments: Normal ST Segments. T Waves: Normal. Axis: Normal. Other Impression: Nonspecific intraventricular block          Medical Decision Making:   History:   Old Medical Records: I decided to obtain old medical records.  Initial Assessment:     The patient is a 52 y.o. female with co-morbidities including: asthma, HTN, CAD, pacemaker, seizures, A-fib and a PMHx of stroke who presents to the ED with a complaint of right upper and lower extremity weakness, chest pain and intermittent headache. Pt has a hx of a left-sided stroke in 2013 with residual left sided weakness. Pt woke up this morning with dizziness and light headedness. Pt then reports an episode of chest pain at 1:00 PM today followed by a headache located behind her right eye that she rates at a 10/10.  She additionally endorses nausea. Pt denies speech difficulty, visual disturbance, fever, recent trauma, sick contact, tylenol or motrin use. Pt regularly takes gabapentin, Topamax, carvedilol and valsartan.    Differential Diagnosis:   Rule out CVA  Clinical Tests:   Radiological Study: Ordered and Reviewed  ED Management:  Stroke team at bedside on my arrival to pt room.  Patient immediately placed on a monitor, cardene drip started for  elevated Bp.  Also, will be admitted to the Dept. Of Neurology.  Other:   I have discussed this case with another health care provider.            Scribe Attestation:   Scribe #1: I performed the above scribed service and the documentation accurately describes the services I performed. I attest to the accuracy of the note.               Clinical Impression:   The primary encounter diagnosis was Embolic stroke involving left middle cerebral artery. Diagnoses of Stroke, Cerebrovascular accident (CVA), unspecified mechanism, and Presence of automatic implantable cardioverter-defibrillator were also pertinent to this visit.    Disposition:   Disposition: Admitted  Condition: Stable                        Anil Mayorga MD  04/11/18 1930       Anil Mayorga MD  05/01/18 1402

## 2018-04-11 NOTE — TELEPHONE ENCOUNTER
"----- Message from Eliezer Gonzalez sent at 4/11/2018  1:43 PM CDT -----  Just a FYI:      [facility] contacted [facility] on this afternoon with c/o "went into A-fib, was really dizzy and felt nauseated" and this lasted "six minutes".  Asked the patient how was she currently feeling.  Patient stated, " My heart is not in A-fib anymore, but I still feel really bad".  Asked the patient if she could elaborate/describe what/how she was feeling.  Patient again stated she just feels bad, really bad.  Informed her I would have the nurse call her, patient then stated, " I'm just going to have my daughter bring me to the emergency room".  I was then unable to ask or say anything else as to she again stated she was just going to go to the ED.  A FYI message was sent to Dr. Mejia' nurse.   "

## 2018-04-11 NOTE — TELEPHONE ENCOUNTER
----- Message from Pacheco Jorge MA sent at 4/11/2018  2:07 PM CDT -----  Contact: Ms Sandro Dey/  daughter [phone]      ----- Message -----  From: Talisha Jansen  Sent: 4/11/2018   1:43 PM  To: Roberto ABREU Staff    Ms Hook states calling to inform doctor  patient in route to ER.   Patient in Afib blood pressure 200/91  Please callMs Sandro Dey/  daughter [phone]

## 2018-04-11 NOTE — ASSESSMENT & PLAN NOTE
Suspected.  Likely cardioembolic, given known afib (on Eliquis).  However, CTA H/N to r/o stenosis/occlusion of carotids/intracranial vessels as alternative etiology of AIS.      Antithrombotics for secondary stroke prevention: N/A Anticoagulants: Apixaban 5 mg BID     Statins for secondary stroke prevention and hyperlipidemia, if present:   Statins: Atorvastatin- 40 mg daily at home.  Consider increasing to atorvastatin 80mg daily for LDL goal<70 if significant atherosclerosis found on CTA.    Aggressive risk factor modification: HTN, HLD, Diet, Exercise, Obesity, A-Fib, CAD     Rehab efforts: PT/OT/SLP to evaluate and treat    Diagnostics ordered/pending: CTA Head to assess vasculature , CTA Neck/Arch to assess vasculature, TTE to assess cardiac function/status     VTE prophylaxis: None: Reason for No Pharmacological VTE Prophylaxis: Currently on anticoagulation    BP parameters: Infarct: No intervention, SBP <220

## 2018-04-11 NOTE — TELEPHONE ENCOUNTER
Spoke with daughter. Patient complained of heart racing, chest pain, diaphoresis. She called EMS. Pt is in ER now and they are being told that she had CVA. She is on Eliquis 5mg bid. Spoke with Dr Mejia and advised him of events. We checked with Blanca who states her ICD showed no arrhythmias. Dr Mejia is going to order an interrogation in the Er.

## 2018-04-11 NOTE — ED NOTES
Patient states she was sitting watching TV and felt her heart begin to race.  EMS states when they were en route to the ED she started with right sided weakness and a severe headache.  Hx stroke with left sided deficits.  LSN [id number]

## 2018-04-12 ENCOUNTER — TELEPHONE (OUTPATIENT)
Dept: NEUROLOGY | Facility: CLINIC | Age: 53
End: 2018-04-12

## 2018-04-12 VITALS
OXYGEN SATURATION: 94 % | HEART RATE: 104 BPM | RESPIRATION RATE: 16 BRPM | TEMPERATURE: 96 F | DIASTOLIC BLOOD PRESSURE: 87 MMHG | BODY MASS INDEX: 42.07 KG/M2 | SYSTOLIC BLOOD PRESSURE: 158 MMHG | HEIGHT: 67 IN | WEIGHT: 268.06 LBS

## 2018-04-12 PROBLEM — G45.9 TIA (TRANSIENT ISCHEMIC ATTACK): Status: ACTIVE | Noted: 2018-04-12

## 2018-04-12 PROBLEM — E87.1 HYPONATREMIA: Status: ACTIVE | Noted: 2018-04-12

## 2018-04-12 LAB
ALBUMIN SERPL BCP-MCNC: 3.2 G/DL
ALP SERPL-CCNC: 66 U/L
ALT SERPL W/O P-5'-P-CCNC: 29 U/L
ANION GAP SERPL CALC-SCNC: 7 MMOL/L
APTT BLDCRRT: 21.5 SEC
AST SERPL-CCNC: 18 U/L
BASOPHILS # BLD AUTO: 0.01 K/UL
BASOPHILS NFR BLD: 0.2 %
BILIRUB SERPL-MCNC: 0.3 MG/DL
BUN SERPL-MCNC: 15 MG/DL
CALCIUM SERPL-MCNC: 9.7 MG/DL
CHLORIDE SERPL-SCNC: 104 MMOL/L
CK MB SERPL-MCNC: 1.5 NG/ML
CK MB SERPL-RTO: 0.7 %
CK SERPL-CCNC: 202 U/L
CO2 SERPL-SCNC: 22 MMOL/L
CREAT SERPL-MCNC: 0.9 MG/DL
DIFFERENTIAL METHOD: ABNORMAL
EOSINOPHIL # BLD AUTO: 0 K/UL
EOSINOPHIL NFR BLD: 0 %
ERYTHROCYTE [DISTWIDTH] IN BLOOD BY AUTOMATED COUNT: 15.9 %
EST. GFR  (AFRICAN AMERICAN): >60 ML/MIN/1.73 M^2
EST. GFR  (NON AFRICAN AMERICAN): >60 ML/MIN/1.73 M^2
GLUCOSE SERPL-MCNC: 148 MG/DL
HCT VFR BLD AUTO: 36.1 %
HGB BLD-MCNC: 12.1 G/DL
IMM GRANULOCYTES # BLD AUTO: 0.03 K/UL
IMM GRANULOCYTES NFR BLD AUTO: 0.5 %
INR PPP: 1
LYMPHOCYTES # BLD AUTO: 0.9 K/UL
LYMPHOCYTES NFR BLD: 14.1 %
MAGNESIUM SERPL-MCNC: 1.9 MG/DL
MCH RBC QN AUTO: 29.9 PG
MCHC RBC AUTO-ENTMCNC: 33.5 G/DL
MCV RBC AUTO: 89 FL
MONOCYTES # BLD AUTO: 0.1 K/UL
MONOCYTES NFR BLD: 1.8 %
NEUTROPHILS # BLD AUTO: 5.1 K/UL
NEUTROPHILS NFR BLD: 83.4 %
NRBC BLD-RTO: 0 /100 WBC
PHOSPHATE SERPL-MCNC: 3.5 MG/DL
PLATELET # BLD AUTO: 228 K/UL
PMV BLD AUTO: 11.6 FL
POCT GLUCOSE: 136 MG/DL (ref 70–110)
POTASSIUM SERPL-SCNC: 3.9 MMOL/L
PROT SERPL-MCNC: 9.7 G/DL
PROTHROMBIN TIME: 10.1 SEC
RBC # BLD AUTO: 4.05 M/UL
SODIUM SERPL-SCNC: 133 MMOL/L
TROPONIN I SERPL DL<=0.01 NG/ML-MCNC: <0.006 NG/ML
WBC # BLD AUTO: 6.08 K/UL

## 2018-04-12 PROCEDURE — 92610 EVALUATE SWALLOWING FUNCTION: CPT

## 2018-04-12 PROCEDURE — G8998 SWALLOW D/C STATUS: HCPCS | Mod: CH

## 2018-04-12 PROCEDURE — 85025 COMPLETE CBC W/AUTO DIFF WBC: CPT

## 2018-04-12 PROCEDURE — G8996 SWALLOW CURRENT STATUS: HCPCS | Mod: CH

## 2018-04-12 PROCEDURE — 82553 CREATINE MB FRACTION: CPT

## 2018-04-12 PROCEDURE — G8987 SELF CARE CURRENT STATUS: HCPCS | Mod: CJ

## 2018-04-12 PROCEDURE — G8997 SWALLOW GOAL STATUS: HCPCS | Mod: CH

## 2018-04-12 PROCEDURE — 83735 ASSAY OF MAGNESIUM: CPT

## 2018-04-12 PROCEDURE — 97165 OT EVAL LOW COMPLEX 30 MIN: CPT | Mod: 59

## 2018-04-12 PROCEDURE — 25500020 PHARM REV CODE 255: Performed by: PSYCHIATRY & NEUROLOGY

## 2018-04-12 PROCEDURE — 85730 THROMBOPLASTIN TIME PARTIAL: CPT

## 2018-04-12 PROCEDURE — 82550 ASSAY OF CK (CPK): CPT

## 2018-04-12 PROCEDURE — G8988 SELF CARE GOAL STATUS: HCPCS | Mod: CI

## 2018-04-12 PROCEDURE — 92523 SPEECH SOUND LANG COMPREHEN: CPT

## 2018-04-12 PROCEDURE — S0028 INJECTION, FAMOTIDINE, 20 MG: HCPCS | Performed by: PHYSICIAN ASSISTANT

## 2018-04-12 PROCEDURE — G0378 HOSPITAL OBSERVATION PER HR: HCPCS

## 2018-04-12 PROCEDURE — 25000003 PHARM REV CODE 250: Performed by: PHYSICIAN ASSISTANT

## 2018-04-12 PROCEDURE — 63600175 PHARM REV CODE 636 W HCPCS: Performed by: PHYSICIAN ASSISTANT

## 2018-04-12 PROCEDURE — 85610 PROTHROMBIN TIME: CPT

## 2018-04-12 PROCEDURE — 25000003 PHARM REV CODE 250: Performed by: STUDENT IN AN ORGANIZED HEALTH CARE EDUCATION/TRAINING PROGRAM

## 2018-04-12 PROCEDURE — 36415 COLL VENOUS BLD VENIPUNCTURE: CPT

## 2018-04-12 PROCEDURE — 99217 PR OBSERVATION CARE DISCHARGE: CPT | Mod: ,,, | Performed by: PSYCHIATRY & NEUROLOGY

## 2018-04-12 PROCEDURE — 99222 1ST HOSP IP/OBS MODERATE 55: CPT | Mod: ,,, | Performed by: NURSE PRACTITIONER

## 2018-04-12 PROCEDURE — 97802 MEDICAL NUTRITION INDIV IN: CPT

## 2018-04-12 PROCEDURE — 80053 COMPREHEN METABOLIC PANEL: CPT

## 2018-04-12 PROCEDURE — 84484 ASSAY OF TROPONIN QUANT: CPT

## 2018-04-12 PROCEDURE — 84100 ASSAY OF PHOSPHORUS: CPT

## 2018-04-12 RX ORDER — METHYLPREDNISOLONE SOD SUCC 125 MG
125 VIAL (EA) INJECTION ONCE
Status: COMPLETED | OUTPATIENT
Start: 2018-04-12 | End: 2018-04-12

## 2018-04-12 RX ORDER — DABIGATRAN ETEXILATE 150 MG/1
150 CAPSULE ORAL 2 TIMES DAILY
Status: DISCONTINUED | OUTPATIENT
Start: 2018-04-12 | End: 2018-04-12

## 2018-04-12 RX ORDER — MAGNESIUM SULFATE HEPTAHYDRATE 40 MG/ML
2 INJECTION, SOLUTION INTRAVENOUS ONCE
Status: COMPLETED | OUTPATIENT
Start: 2018-04-12 | End: 2018-04-12

## 2018-04-12 RX ORDER — CLOPIDOGREL BISULFATE 75 MG/1
75 TABLET ORAL DAILY
Qty: 30 TABLET | Refills: 1 | Status: SHIPPED | OUTPATIENT
Start: 2018-04-12 | End: 2018-04-12 | Stop reason: SDUPTHER

## 2018-04-12 RX ORDER — KETOROLAC TROMETHAMINE 30 MG/ML
60 INJECTION, SOLUTION INTRAMUSCULAR; INTRAVENOUS ONCE
Status: DISCONTINUED | OUTPATIENT
Start: 2018-04-12 | End: 2018-04-12

## 2018-04-12 RX ORDER — FAMOTIDINE 10 MG/ML
20 INJECTION INTRAVENOUS ONCE
Status: COMPLETED | OUTPATIENT
Start: 2018-04-12 | End: 2018-04-12

## 2018-04-12 RX ORDER — VALSARTAN 80 MG/1
80 TABLET ORAL NIGHTLY
Status: DISCONTINUED | OUTPATIENT
Start: 2018-04-12 | End: 2018-04-12 | Stop reason: HOSPADM

## 2018-04-12 RX ORDER — CARVEDILOL 25 MG/1
25 TABLET ORAL 2 TIMES DAILY WITH MEALS
Status: DISCONTINUED | OUTPATIENT
Start: 2018-04-12 | End: 2018-04-12 | Stop reason: HOSPADM

## 2018-04-12 RX ORDER — KETOROLAC TROMETHAMINE 30 MG/ML
60 INJECTION, SOLUTION INTRAMUSCULAR; INTRAVENOUS ONCE
Status: COMPLETED | OUTPATIENT
Start: 2018-04-12 | End: 2018-04-12

## 2018-04-12 RX ORDER — DABIGATRAN ETEXILATE 150 MG/1
150 CAPSULE ORAL 2 TIMES DAILY
Qty: 60 CAPSULE | Refills: 0 | Status: SHIPPED | OUTPATIENT
Start: 2018-04-12 | End: 2018-04-12 | Stop reason: HOSPADM

## 2018-04-12 RX ORDER — DIPHENHYDRAMINE HYDROCHLORIDE 50 MG/ML
25 INJECTION INTRAMUSCULAR; INTRAVENOUS ONCE
Status: COMPLETED | OUTPATIENT
Start: 2018-04-12 | End: 2018-04-12

## 2018-04-12 RX ORDER — MORPHINE SULFATE 2 MG/ML
2 INJECTION, SOLUTION INTRAMUSCULAR; INTRAVENOUS ONCE
Status: COMPLETED | OUTPATIENT
Start: 2018-04-12 | End: 2018-04-12

## 2018-04-12 RX ADMIN — DIPHENHYDRAMINE HYDROCHLORIDE 25 MG: 50 INJECTION, SOLUTION INTRAMUSCULAR; INTRAVENOUS at 09:04

## 2018-04-12 RX ADMIN — GABAPENTIN 900 MG: 300 CAPSULE ORAL at 03:04

## 2018-04-12 RX ADMIN — FAMOTIDINE 20 MG: 10 INJECTION INTRAVENOUS at 10:04

## 2018-04-12 RX ADMIN — APIXABAN 5 MG: 5 TABLET, FILM COATED ORAL at 09:04

## 2018-04-12 RX ADMIN — METHYLPREDNISOLONE SODIUM SUCCINATE 125 MG: 125 INJECTION, POWDER, FOR SOLUTION INTRAMUSCULAR; INTRAVENOUS at 09:04

## 2018-04-12 RX ADMIN — DESIPRAMINE HYDROCHLORIDE 25 MG: 25 TABLET, FILM COATED ORAL at 09:04

## 2018-04-12 RX ADMIN — DONEPEZIL HYDROCHLORIDE 10 MG: 10 TABLET, FILM COATED ORAL at 09:04

## 2018-04-12 RX ADMIN — ACETAMINOPHEN 650 MG: 325 TABLET, FILM COATED ORAL at 09:04

## 2018-04-12 RX ADMIN — Medication 2 MG: at 03:04

## 2018-04-12 RX ADMIN — IOHEXOL 100 ML: 350 INJECTION, SOLUTION INTRAVENOUS at 11:04

## 2018-04-12 RX ADMIN — ATORVASTATIN CALCIUM 40 MG: 20 TABLET, FILM COATED ORAL at 09:04

## 2018-04-12 RX ADMIN — MAGNESIUM SULFATE IN WATER 2 G: 40 INJECTION, SOLUTION INTRAVENOUS at 05:04

## 2018-04-12 RX ADMIN — KETOROLAC TROMETHAMINE 60 MG: 30 INJECTION, SOLUTION INTRAMUSCULAR; INTRAVENOUS at 05:04

## 2018-04-12 RX ADMIN — DULOXETINE 60 MG: 60 CAPSULE, DELAYED RELEASE ORAL at 09:04

## 2018-04-12 RX ADMIN — CARVEDILOL 25 MG: 25 TABLET, FILM COATED ORAL at 05:04

## 2018-04-12 RX ADMIN — GABAPENTIN 900 MG: 300 CAPSULE ORAL at 09:04

## 2018-04-12 RX ADMIN — VALSARTAN 80 MG: 80 TABLET ORAL at 05:04

## 2018-04-12 NOTE — PLAN OF CARE
Problem: Patient Care Overview  Goal: Plan of Care Review  Outcome: Ongoing (interventions implemented as appropriate)  Patient AAOx4. Safety maintained. Bed locked in low with 2 side rails up. Call light within reach. No complaints of pain.

## 2018-04-12 NOTE — PLAN OF CARE
PCP:Kell Nix MD    Extended Emergency Contact Information  Primary Emergency Contact: Silverio Dey  Address: [address] [place] [address] [facility]  Home Phone: [phone]  Mobile Phone: [phone]  Relation: Daughter  Preferred language: English  Secondary Emergency Contact: Maribel Avendaño   [facility]  Home Phone: [phone]  Mobile Phone: [phone]  Relation: Sister  Preferred language: English    [facility] - [place], [place] - [address] AT [facility]  [address]  [facility] [address]  Phone: [phone] Fax: [phone]    Payor: MEDICARE / Plan: MEDICARE PART A & B / Product Type: Government /     Patient was independnet living with her adult children, 10 yr old daughter and grandchildren. PT/OT/SLP are recommending out patient therapy. Cm will continue to follow.     04/12/18 0840   Discharge Assessment   Assessment Type Discharge Planning Assessment   Confirmed/corrected address and phone number on facesheet? Yes   Assessment information obtained from? Patient   Expected Length of Stay (days) 1   Communicated expected length of stay with patient/caregiver yes   Prior to hospitilization cognitive status: Alert/Oriented   Prior to hospitalization functional status: Assistive Equipment   Current cognitive status: Alert/Oriented   Current Functional Status: Assistive Equipment   Facility Arrived From: Home   Lives With child(glen), adult;grandchild(glen)   Who are your caregiver(s) and their phone number(s)? Silverio Dey (daughter) [phone], Maribel Jarocho (sister) [phone]   Patient's perception of discharge disposition home or selfcare   Readmission Within The Last 30 Days no previous admission in last 30 days   Patient currently being followed by outpatient case management? No   Patient currently receives any other outside agency services? No   Equipment Currently Used at Home  none   Do you have any problems affording any of your prescribed medications? No   Is the patient taking medications as prescribed? yes   Does the patient have transportation home? Yes   Transportation Available car;family or friend will provide   Does the patient receive services at the [facility]? No   Discharge Plan A Home with family  (out patient)   Discharge Plan B Home with family   Patient/Family In Agreement With Plan yes

## 2018-04-12 NOTE — PLAN OF CARE
Problem: Patient Care Overview  Goal: Plan of Care Review  Outcome: Ongoing (interventions implemented as appropriate)  Patient has remained free of falls this shift. She is AAOx4 and VS's have been stable. Although she has an excess of calories, she has continued to eat all shift. She still shows a very slight right facial droop and her right hand was a little weaker that the left hand.  She does get up to the bedside commode and has been urinating appropriately during the shift.

## 2018-04-12 NOTE — PT/OT/SLP EVAL
Physical Therapy Evaluation    Patient Name:  Amna Chawla   MRN:  [id number]  Admitting Diagnosis:  stroke   Recent Surgery: * No surgery found *      Recommendations:     Discharge Recommendations:  outpatient PT, outpatient OT   Discharge Equipment Recommendations: none   Barriers to discharge: None    Plan:     During this hospitalization, patient to be seen 3 x/week to address the above listed problems via gait training, therapeutic activities, therapeutic exercises, neuromuscular re-education  · Plan of Care Expires:  05/11/18   Plan of Care Reviewed with: patient    This Plan of care has been discussed with the patient who was involved in its development and understands and is in agreement with the identified goals and treatment plan    History:     Patient lives with their spouse & family in 3rd floor apartment in a senior living center with elevator access.  Patient reports being independent with ADLs.  Patient uses DME as follows: none.    DME owned (not currently used): none.  Hand Dominance: right  Wears Glasses: yes    Roles/Repsonsibilities:   Work: no.    Drive: no.    Managing Medicines/Managing Home: yes (uses an alarm on her phone).   Upon discharge, patient will have assistance from  and family.    Past Medical History:   Diagnosis Date    Anxiety     Asthma     Brain anoxic injury     Coronary artery disease     Defibrillator activation 2013    Depression     History of sudden cardiac arrest 2/2013    PEA arrest with subsequent long-QT    Hx of psychiatric care     Hypertension     Left atrial enlargement 4/11/2018    Pacemaker 2013    Psychiatric problem     Seizures     Sleep difficulties     Stroke     weakness lt side    Therapy        Past Surgical History:   Procedure Laterality Date    breast reduction      BREAST SURGERY      CARDIAC DEFIBRILLATOR PLACEMENT      CARDIAC DEFIBRILLATOR PLACEMENT      CARPAL TUNNEL RELEASE Right     COSMETIC SURGERY      HERNIA  "REPAIR      HIATAL HERNIA REPAIR      INSERT / REPLACE / REMOVE PACEMAKER      TUBAL LIGATION         Subjective     Communicated with RN prior to session.  Patient found supine upon PT entry to room, agreeable to evaluation.    "My leg still feels like its dragging"  Chief Complaint: right sided weakness  Patient comments/goals: to get better  Pain/Comfort:  · Pain Rating 1: 10/10  · Location - Side 1: Bilateral  · Location - Orientation 1: generalized  · Location 1:  (headache)  · Pain Addressed 1: Cessation of Activity  · Pain Rating Post-Intervention 1: 10/10    Objective:     Patient found with: telemetry     General Precautions: Standard, Cardiac fall   Orthopedic Precautions:N/A   Braces: N/A     Exam:    · Mental Status: Patient is oriented to Person, Place, Time and Situation and follows 100% of verbal commands. Pt is Alert and Cooperative during session.  · Skin: Visible skin intact  · Edema: none noted  · Sensation: Intact (pt reports increased light touch on left vs. Right)  · Hearing: Intact  · Vision:  Intact  · Coordination:  Intact  Left hand, finger to nose and Right hand, finger to nose  · Range of Motion:  · RUE: WFL  · LUE: WFL  · RLE: WFL  · LLE: WNL  · Strength Exam:  Lower Extremity Strength  (R) LE  (L) LE    Hip Flexion: 4/5 Hip flexion: 5/5   Knee flexion: 4/5 Knee flexion: 5/5   Knee extension: 4/5 Knee extension 5/5   Ankle dorsiflexion: 4/5 Ankle dorsiflexion: 5/5   Ankle plantarflexion: 4/5 Ankle plantarflexion: 5/5     Functional Mobility:  Bed Mobility:     · Scooting to EOB to have both feet planted on floor: stand by assistance  · Supine to Sit: stand by assistance; from left side of bed  · Sit to Supine: stand by assistance; to left side of bed    Transfers:   · Sit <> Stand Transfer: contact guard assistance with no assistive device   · Stand <> Sit Transfer: contact guard assistance with no assistive device       Gait:  · Patient ambulated: 120ft   · Patient required: standy " by assistance and contact guard  · Patient used:  No Assistive Device  · Gait Pattern observed: reciprocal gait  · Gait Deviation(s): decreased jessie and decreased weight-shifting ability  · Impairments due to: decreased strength, impaired balance  · Comments: decreased step length of RLE, increased toe out of RLE    Therapeutic Activities & Exercises:     Education:  Patient provided with daily orientation and goals of this PT session. Patient agreed to participate in session. Patient aware of patient's deficits and therapy progression. They were educated to transfer with RN/PCT only; CGA of 1 person. Encouraged patient to perform following daily exercises & mobility to increase endurance and decrease effects of bedrest: sit UIC majority of day. Time provided for therapeutic counseling and discussion of health disposition. All questions answered to patient's satisfaction, within scope of PT practice; voiced no other concerns. White board updated in patient's room, RN notified of session.    Patient left supine, with head in midline, neutral pelvis & heels floated for skin protection with all lines intact, call button in reach and RN notified    AM-PAC 6 CLICK MOBILITY  Total Score:18     Assessment:     Amna Chawla is a 52 y.o. female admitted with a medical diagnosis of stroke.  She presents with the following impairments/functional limitations:  weakness, gait instability, impaired functional mobilty, impaired balance, impaired self care skills, decreased lower extremity function, decreased upper extremity function, pain. Pt presents with decreased strength of RLE which affects her functional mobility and safe ambulation. Amna Chawla's deficits effect their ability to safely and independently participate in self-care tasks and functional mobility which increases their caregiver burden. Due to her physical therapy diagnosis of debility and deconditioning, they continue to benefit from acute PT services  to address the following limitations: high fall risk, weakness, instability, the need for supervised instruction of exercise. Amna Chawla's deficits effect their roles and responsibilities in which they were able to complete prior to admit. Education was provided to patient regarding importance of continued participation in therapy, patient's progress and discharge disposition. Pt would benefit from an intensive and collaborative PT/OT/SLP therapy program to improve quality of life and focus on recovery of impairments.     Rehab Prognosis: good; patient would benefit from acute skilled PT services to address these deficits and reach maximum level of function.      GOALS:    Physical Therapy Goals        Problem: Physical Therapy Goal    Goal Priority Disciplines Outcome Goal Variances Interventions   Physical Therapy Goal     PT/OT, PT Ongoing (interventions implemented as appropriate)     Description:  Goals to be met by: 4/20/2018    Patient will increase functional independence with mobility by performing:    Supine to sit with Modified [place].  Sit to supine with Modified [place].   Sit to stand transfer with Modified [place] using Straight Cane.  Bed to chair transfer via Stand Pivot with Modified [place] using Straight Cane.  Gait  x 150 feet with Supervision using Straight Cane.    Able to tolerate exercise for 15-20 reps with independence.  Patient and family able to teachback stroke & positioning education independently.                       Clinical Decision Making:   HISTORY: Co-morbidities and personal factors that may impact the plan of care  Co-morbidities:   [] Time since onset of injury / illness / exacerbation [] Status of current condition []Patient's cognitive status and safety concerns  [x] Multiple Medical Problems  Personal Factors:  [] Patient's age [x] Prior Level of function [] Patient's home situation (environment and family support) [] Patient's level of  motivation [] Expected progression of patient  EXAMINATION: Body Structures and Functions, activity limitations and participation restrictions that may impact the plan of care  Body Regions:  [] Head [] Neck  [] Trunk [x] Upper Extremity [x] Lower Extremity  Body Systems:  [] For communication ability, affect, cognition, language, and learning style: the assessment of the ability to make needs known, consciousness, orientation, expected emotional /behavioral responses, and learning preferences  [x] For the neuromuscular system: a general assessment of gross coordinated movement (eg, balance, gait, locomotion, transfers, and transitions) and motor function (motor control and motor learning)  [] For the musculoskeletal system: the assessment of gross symmetry, gross range of motion, gross strength, height, and weight  [] For the integumentary system: the assessment of pliability(texture), presence of scar formation, skin color, and skin integrity  [] For cardiovascular/pulmonary system: the assessment of heart rate, respiratory rate, blood pressure, and edema   Activity limitations:   [] Patient's cognitive status and safety concerns [] Status of current condition      [] Weight bearing restriction [] Cardiopulmunary Restriction  Participation Restrictions:   [] Goals and goal agreement with the patient  [] Rehab potential (prognosis) and probable outcome    CLINICAL PRESENTATION:  [x] stable [] evolving [] unstable    On examination of body system using standardized tests and measures patient presents with 1-2 elements from any of the following: body structures and functions, activity limitations, and/or participation restrictions.  Leading to a clinical presentation that is considered stable and/or uncomplicated  Evaluation Complexity:  High - 36188      Time Tracking:     PT Received On: 04/12/18  PT Start Time: 0824     PT Stop Time: 0837  PT Total Time (min): 13 min     Billable Minutes: Evaluation 13    Tiara SOLIS  Yogi HAYNES, DPT  04/12/2018  Pager:[phone]

## 2018-04-12 NOTE — PLAN OF CARE
Problem: SLP Goal  Goal: SLP Goal  Outcome: Outcome(s) achieved Date Met: 04/12/18  Clinical evaluation of swallow and speech/ language/ cognitive evaluation complete. Recommend regular consistency diet and thin liquids. Pt appears at cognitive-linguistic baseline. No further SLP needs at this time. Please re-consult should changes occur.     ELIZABETH Caldera, CCC-SLP  [phone]  4/12/2018

## 2018-04-12 NOTE — ASSESSMENT & PLAN NOTE
-  Presented with acute onset right sided weakness and facial droop  -  CTH without acute pathology--> not tPA candidate 2/2 Eliquis  CTA head and neck limited; however, without definite evidence of major vascular occlusion  -  Unable to obtain MRI 2/2 pacemaker  See hospital course for functional, cognitive/speech/language, and nutrition/swallow status.      Recommendations  -  Encourage mobility, OOB in chair, and early ambulation as appropriate   -  PT/OT evaluate and treat  -  SLP speech and cognitive evaluate and treat  -  Monitor mood and sleep disturbances  -  Establish consistent sleep-wake cycle  -  Monitor for bowel and bladder dysfunction  -  Monitor for shoulder pain and subluxation  -  Monitor for spasticity  -  Monitor for and prevent skin breakdown and pressure ulcers  · Early mobility, repositioning/weight shifting every 20-30 minutes when sitting, turn patient every 2 hours, proper mattress/overlay and chair cushioning, pressure relief/heel protector boots  -  DVT prophylaxis:  On Eliquis

## 2018-04-12 NOTE — ASSESSMENT & PLAN NOTE
53 yo woman with PMHx AFib on Eliquis (recently switch from Coumadin 4/3/18), prior cardiac arrest with associated stroke (residual mild L sided weakness), CAD with ICD in situ, HTN, and HLD presenting with acute R-sided weakness (including face) and sensation changes. SBP en route 200/100. No tPA 2/2 Eliquis use. Low suspicion for LVO. Admitted to  for observation overnight.      CTA H/N with no evidence of acute infarct. Concern for TIA, though migraine also possible cause of pt's presentation. Noncalcified plaquing of carotid bifurcations and proximal ICAs with < 50% stenosis; adding Plavix.   Staff concerned that pt had an event while on Eliquis; wanted to try Pradaxa but switched to Xarelto per pt's insurance coverage.     D/c'd home Clorthalidone due to hyponatremia; pt to [facility] in [facility] with [facility] on Monday.     Pt discharged home 4/12/18 with outpatient PT, OT.      Antithrombotics for secondary stroke prevention: Anticoagulants: Rivaroxaban 20 mg daily  Statins for secondary stroke prevention and hyperlipidemia, if present:   Statins: Atorvastatin- 40 mg daily  Aggressive risk factor modification: HTN, HLD, Diet, Exercise, Obesity, A-Fib, CAD  Rehab efforts: PT/OT/SLP to evaluate and treat  Diagnostics ordered/pending: None   VTE prophylaxis: None: Reason for No Pharmacological VTE Prophylaxis: Currently on anticoagulation  BP parameters: TIA: SBP < 160

## 2018-04-12 NOTE — CONSULTS
[facility]  Physical Medicine & Rehab  Consult Note    Patient Name: Amna Chawla  MRN: [id number]  Admission Date: 4/11/2018  Hospital Length of Stay: 0 days  Attending Physician: Perlita Ruth MD     Inpatient consult to Physical Medicine & Rehabilitation  Consult performed by: Neelam Escobedo NP  Consult requested by:  Perlita Ruth MD    Collaborating Physician: Bertha Merchant MD  Reason for Consult:  assess rehabilitation needs    Consults  Subjective:     Principal Problem: Stroke    HPI: Amna Chawla is a 62-year-old female with PMHx of HTN, HLD, CAD, PEA arrest in 2013 s/p pacemaker and ICD with subsequent anoxic brain injury, a-fib (on Eliquis), seizures, stroke with residual L sided weakness, asthma, depression, and anxiety.  Patient presented to [facility] on 4/11/18 with acute onset right sided weakness and facial droop.  On arrival, found to be hypertensive.  CTH without acute pathology; however, not tPA candidate 2/2 Eliquis.  CTA head and neck limited; however, without definite evidence of major vascular occlusion.  Unable to obtain MRI 2/2 pacemaker.     Functional History: Patient lives in [place] with her , daughter, and 2-year-old grandson in a 3rd floor apartment with elevator access to enter.  Prior to admission, she was (I) with ADLs and mod(I) with mobility.  Ambulated with cane.  She is right handed.  She does not drive; not currently working.  Participated in IRF program after previous stroke.  DME: cane.    Hospital Course: 4/12/18:  Therapy evaluations pending.     Past Medical History:   Diagnosis Date    Anxiety     Asthma     Brain anoxic injury     Coronary artery disease     Defibrillator activation 2013    Depression     History of sudden cardiac arrest 2/2013    PEA arrest with subsequent long-QT    Hx of psychiatric care     Hypertension     Left atrial enlargement 4/11/2018    Pacemaker 2013    Psychiatric problem     Seizures      Sleep difficulties     Stroke     weakness lt side    Therapy      Past Surgical History:   Procedure Laterality Date    breast reduction      BREAST SURGERY      CARDIAC DEFIBRILLATOR PLACEMENT      CARDIAC DEFIBRILLATOR PLACEMENT      CARPAL TUNNEL RELEASE Right     COSMETIC SURGERY      HERNIA REPAIR      HIATAL HERNIA REPAIR      INSERT / REPLACE / REMOVE PACEMAKER      TUBAL LIGATION       Review of patient's allergies indicates:   Allergen Reactions    Aspirin Hives    Imitrex [sumatriptan] Palpitations    Nsaids (non-steroidal anti-inflammatory drug) Shortness Of Breath    Penicillins Hives and Swelling    Shellfish containing products Anaphylaxis     seafood    Percocet [oxycodone-acetaminophen] Itching     States can take    Reglan [metoclopramide hcl] Other (See Comments)     Parkinsonism        Scheduled Medications:    apixaban  5 mg Oral BID    ARIPiprazole  5 mg Oral QHS    atorvastatin  40 mg Oral QAM    chlorthalidone  12.5 mg Oral Daily    desipramine  25 mg Oral Daily    donepezil  10 mg Oral BID    DULoxetine  60 mg Oral BID    famotidine (PF)  20 mg Intravenous Once    gabapentin  900 mg Oral TID    magnesium oxide  200 mg Oral Daily    sodium chloride 0.9%  3 mL Intravenous Q8H       PRN Medications: acetaminophen, labetalol, ondansetron, oxyCODONE, sodium chloride 0.9%, tiZANidine    Family History     Problem Relation (Age of Onset)    COPD     Glaucoma Maternal Grandmother, Paternal Grandmother    Heart failure     Hypertension Mother        Social History Main Topics    Smoking status: Never Smoker    Smokeless tobacco: Never Used    Alcohol use No    Drug use: No    Sexual activity: Not Currently     Review of Systems   Constitutional: Negative for chills, fatigue and fever.   HENT: Negative for drooling, hearing loss, trouble swallowing and voice change.    Eyes: Negative for pain and visual disturbance.   Respiratory: Negative for cough, shortness of  breath and wheezing.    Cardiovascular: Negative for chest pain and palpitations.   Gastrointestinal: Negative for abdominal distention, nausea and vomiting.   Genitourinary: Negative for difficulty urinating and flank pain.   Musculoskeletal: Positive for gait problem. Negative for arthralgias, back pain, myalgias and neck pain.   Skin: Negative for rash and wound.   Neurological: Positive for weakness (residual L < new R), numbness and headaches. Negative for dizziness.   Psychiatric/Behavioral: Negative for agitation and hallucinations. The patient is not nervous/anxious.         Memory difficulty since anoxic brain injury     Objective:     Vital Signs (Most Recent):  Temp: 97.1 °F (36.2 °C) (18)  Pulse: 100 (18)  Resp: 16 (18)  BP: (!) 171/85 (18)  SpO2: 97 % (18)    Vital Signs (24h Range):  Temp:  [97.1 °F (36.2 °C)-98.8 °F (37.1 °C)] 97.1 °F (36.2 °C)  Pulse:  [] 100  Resp:  [15-24] 16  SpO2:  [92 %-100 %] 97 %  BP: (132-221)/() 171/85     Body mass index is 41.99 kg/m².    Physical Exam   Constitutional: She is oriented to person, place, and time. She appears well-developed and well-nourished. No distress.   HENT:   Head: Normocephalic and atraumatic.   Right Ear: External ear normal.   Left Ear: External ear normal.   Nose: Nose normal.   Eyes: Right eye exhibits no discharge. Left eye exhibits no discharge. No scleral icterus.   Neck: Normal range of motion.   Cardiovascular: Normal rate, regular rhythm and intact distal pulses.    Pulmonary/Chest: Effort normal. No respiratory distress. She has no wheezes.   Abdominal: Soft. She exhibits no distension. There is no tenderness.   Musculoskeletal: Normal range of motion. She exhibits no edema or tenderness.   Neurological: She is alert and oriented to person, place, and time. She exhibits normal muscle tone.   -  Mental Status:  AAOx3.  Follows commands.  Answers correct age and .  -   Speech and language:  no aphasia or dysarthria.     -  Facial movement (CN VII): symmetrical, decreased facial sensation on right,   -  Motor:  RUE: 4/5.  LUE: 4+/5.  RLE: 4/5.  LLE: 4+/5.  -  Tone:  Normal.   -  Sensory:  Intact to light touch and pin prick on left, decreased on right.    Skin: Skin is warm and dry. No rash noted.   Psychiatric: She has a normal mood and affect. Her behavior is normal. Thought content normal.   Vitals reviewed.    Diagnostic Results:   Labs: Reviewed  X-Ray: Reviewed  CT: Reviewed  CTA: Reviewed    Assessment/Plan:     Embolic stroke involving left middle cerebral artery    -  Presented with acute onset right sided weakness and facial droop  -  CTH without acute pathology--> not tPA candidate 2/2 Eliquis  CTA head and neck limited; however, without definite evidence of major vascular occlusion  -  Unable to obtain MRI 2/2 pacemaker  See hospital course for functional, cognitive/speech/language, and nutrition/swallow status.      Recommendations  -  Encourage mobility, OOB in chair, and early ambulation as appropriate   -  PT/OT evaluate and treat  -  SLP speech and cognitive evaluate and treat  -  Monitor mood and sleep disturbances  -  Establish consistent sleep-wake cycle  -  Monitor for bowel and bladder dysfunction  -  Monitor for shoulder pain and subluxation  -  Monitor for spasticity  -  Monitor for and prevent skin breakdown and pressure ulcers  · Early mobility, repositioning/weight shifting every 20-30 minutes when sitting, turn patient every 2 hours, proper mattress/overlay and chair cushioning, pressure relief/heel protector boots  -  DVT prophylaxis:  On Eliquis         Paroxysmal atrial fibrillation    -  On home Eliquis        Pacemaker    -  Unable to obtain MRI        Therapy evaluations pending.  Not at baseline.  Will follow progress and discuss with rehab team for final rehab recommendation.    Thank you for your consult.     INOCENCIA Haney  Department of  Physical Medicine & Rehab  [facility]

## 2018-04-12 NOTE — CONSULTS
Food & Nutrition  Education      Diet Education: Stroke Nutrition Therapy  Time Spent: 15 min  Learners: Patient      Consult received for stroke pathway s/p stroke with risk factors of HTN, HLD, & A-fib. Pt seen this am after finishing breakfast. Educated on low sodium/ fat diet discussing foods to eat in moderation, label reading and meal planning tips . Nutrition education provided with handouts. All questions and concerns answered. Dietitian's contact information provided.       Follow-Up: 1x week

## 2018-04-12 NOTE — SUBJECTIVE & OBJECTIVE
Past Medical History:   Diagnosis Date    Anxiety     Asthma     Brain anoxic injury     Coronary artery disease     Defibrillator activation 2013    Depression     History of sudden cardiac arrest 2/2013    PEA arrest with subsequent long-QT    Hx of psychiatric care     Hypertension     Left atrial enlargement 4/11/2018    Pacemaker 2013    Psychiatric problem     Seizures     Sleep difficulties     Stroke     weakness lt side    Therapy      Past Surgical History:   Procedure Laterality Date    breast reduction      BREAST SURGERY      CARDIAC DEFIBRILLATOR PLACEMENT      CARDIAC DEFIBRILLATOR PLACEMENT      CARPAL TUNNEL RELEASE Right     COSMETIC SURGERY      HERNIA REPAIR      HIATAL HERNIA REPAIR      INSERT / REPLACE / REMOVE PACEMAKER      TUBAL LIGATION       Review of patient's allergies indicates:   Allergen Reactions    Aspirin Hives    Imitrex [sumatriptan] Palpitations    Nsaids (non-steroidal anti-inflammatory drug) Shortness Of Breath    Penicillins Hives and Swelling    Shellfish containing products Anaphylaxis     seafood    Percocet [oxycodone-acetaminophen] Itching     States can take    Reglan [metoclopramide hcl] Other (See Comments)     Parkinsonism        Scheduled Medications:    apixaban  5 mg Oral BID    ARIPiprazole  5 mg Oral QHS    atorvastatin  40 mg Oral QAM    chlorthalidone  12.5 mg Oral Daily    desipramine  25 mg Oral Daily    donepezil  10 mg Oral BID    DULoxetine  60 mg Oral BID    famotidine (PF)  20 mg Intravenous Once    gabapentin  900 mg Oral TID    magnesium oxide  200 mg Oral Daily    sodium chloride 0.9%  3 mL Intravenous Q8H       PRN Medications: acetaminophen, labetalol, ondansetron, oxyCODONE, sodium chloride 0.9%, tiZANidine    Family History     Problem Relation (Age of Onset)    COPD     Glaucoma Maternal Grandmother, Paternal Grandmother    Heart failure     Hypertension Mother        Social History Main Topics     Smoking status: Never Smoker    Smokeless tobacco: Never Used    Alcohol use No    Drug use: No    Sexual activity: Not Currently     Review of Systems   Constitutional: Negative for chills, fatigue and fever.   HENT: Negative for drooling, hearing loss, trouble swallowing and voice change.    Eyes: Negative for pain and visual disturbance.   Respiratory: Negative for cough, shortness of breath and wheezing.    Cardiovascular: Negative for chest pain and palpitations.   Gastrointestinal: Negative for abdominal distention, nausea and vomiting.   Genitourinary: Negative for difficulty urinating and flank pain.   Musculoskeletal: Positive for gait problem. Negative for arthralgias, back pain, myalgias and neck pain.   Skin: Negative for rash and wound.   Neurological: Positive for weakness (residual L < new R), numbness and headaches. Negative for dizziness.   Psychiatric/Behavioral: Negative for agitation and hallucinations. The patient is not nervous/anxious.         Memory difficulty since anoxic brain injury     Objective:     Vital Signs (Most Recent):  Temp: 97.1 °F (36.2 °C) (04/12/18 0901)  Pulse: 100 (04/12/18 0901)  Resp: 16 (04/12/18 0901)  BP: (!) 171/85 (04/12/18 0901)  SpO2: 97 % (04/12/18 0901)    Vital Signs (24h Range):  Temp:  [97.1 °F (36.2 °C)-98.8 °F (37.1 °C)] 97.1 °F (36.2 °C)  Pulse:  [] 100  Resp:  [15-24] 16  SpO2:  [92 %-100 %] 97 %  BP: (132-221)/() 171/85     Body mass index is 41.99 kg/m².    Physical Exam   Constitutional: She is oriented to person, place, and time. She appears well-developed and well-nourished. No distress.   HENT:   Head: Normocephalic and atraumatic.   Right Ear: External ear normal.   Left Ear: External ear normal.   Nose: Nose normal.   Eyes: Right eye exhibits no discharge. Left eye exhibits no discharge. No scleral icterus.   Neck: Normal range of motion.   Cardiovascular: Normal rate, regular rhythm and intact distal pulses.    Pulmonary/Chest:  Effort normal. No respiratory distress. She has no wheezes.   Abdominal: Soft. She exhibits no distension. There is no tenderness.   Musculoskeletal: Normal range of motion. She exhibits no edema or tenderness.   Neurological: She is alert and oriented to person, place, and time. She exhibits normal muscle tone.   -  Mental Status:  AAOx3.  Follows commands.  Answers correct age and .  -  Speech and language:  no aphasia or dysarthria.     -  Facial movement (CN VII): symmetrical, decreased facial sensation on right,   -  Motor:  RUE: 4/5.  LUE: 4+/5.  RLE: 4/5.  LLE: 4+/5.  -  Tone:  Normal.   -  Sensory:  Intact to light touch and pin prick on left, decreased on right.    Skin: Skin is warm and dry. No rash noted.   Psychiatric: She has a normal mood and affect. Her behavior is normal. Thought content normal.   Vitals reviewed.    NEUROLOGICAL EXAMINATION:     MENTAL STATUS   Oriented to person, place, and time.       Diagnostic Results:   Labs: Reviewed  X-Ray: Reviewed  CT: Reviewed  CTA: Reviewed

## 2018-04-12 NOTE — TELEPHONE ENCOUNTER
----- Message from Laura Real RN sent at 4/12/2018  3:54 PM CDT -----  Contact: Laura Kc  Please schedule hospital follow up in 4-6 weeks. Dr. Ruth saw the patient while in the hospital. Please call the patient with date and time of appt.

## 2018-04-12 NOTE — PLAN OF CARE
Problem: Occupational Therapy Goal  Goal: Occupational Therapy Goal  Goals to be met by: 4/19/2018     Patient will increase functional independence with ADLs by performing:    UE Dressing with [place].  LE Dressing with [place].  Grooming while standing with Supervision.  Toileting from toilet with Supervision for hygiene and clothing management.   Toilet transfer to toilet with Supervision.  Pt will perform functional task in standing with no instances of LOB noted.      OT evaluation complete and POC established.  OP OT/PT is recommended upon d/c from hospital.    YUDI Lisa  4/12/2018

## 2018-04-12 NOTE — CONSULTS
Inpatient consult to Physical Medicine Rehab  Consult performed by: FOUZIA LUCAS  Consult ordered by: GLORIA GARCIA  Reason for consult: assess rehab needs      Reviewed patient history and current admission.  Rehab team following.  Full consult to follow.    SAE Haney, FNP-C  Physical Medicine & Rehabilitation   04/12/2018  Spectralink: [id number]

## 2018-04-12 NOTE — PROGRESS NOTES
Pt arrived to OBS 12 from ED via stretcher accompanied by family.  Pt aao. Telemetry monitor in place. Safety precautions maintained. Bed in low position wheels locked side rails up x 2. Call light within reach.

## 2018-04-12 NOTE — PLAN OF CARE
04/12/18 1555   Final Note   Assessment Type Final Discharge Note   Discharge Disposition Home  (out patient OT)     Patient is discharged to home today with out patient OT. Patient's family will provide transportation home today. Darrell established a new PCP with [facility] per patient request. Darrell made PCP appt with Dr. Nix on 4/16/18 at 2:45 pm. In basket message was sent to Vascular Neurology for hospital follow up in 4-6 weeks. DARRELL price checked Xerralto prescription for the patient and it will be $ 8.10. [facility] team notified.    1640  Cm spoke to the patient to discuss the new medication Xerralto and it's price. Cm informed the patient that Xerralto will cost $ 8.10. The patient stated that that will be fine. Darrell also explained that an out patient prescription will be sent through the tube system to the nurses station and she needs to make sure she gets that paper prescription for out patient therapy before she discharges.

## 2018-04-12 NOTE — PLAN OF CARE
Problem: Physical Therapy Goal  Goal: Physical Therapy Goal  Goals to be met by: 4/20/2018    Patient will increase functional independence with mobility by performing:    Supine to sit with Modified [place].  Sit to supine with Modified [place].   Sit to stand transfer with Modified [place] using Straight Cane.  Bed to chair transfer via Stand Pivot with Modified [place] using Straight Cane.  Gait  x 150 feet with Supervision using Straight Cane.    Able to tolerate exercise for 15-20 reps with independence.  Patient and family able to teachback stroke & positioning education independently.    Outcome: Ongoing (interventions implemented as appropriate)    Pt would benefit from OPPT upon discharge. Goals remain appropriate.  Appropriate to transfer with: RN/PCT CGA of 1 person  Tiara Calix PT, DPT  4/12/2018  Pager: [phone]

## 2018-04-12 NOTE — HPI
Amna Chawla is a 62-year-old female with PMHx of HTN, HLD, CAD, PEA arrest in 2013 s/p pacemaker and ICD with subsequent anoxic brain injury, a-fib (on Eliquis), seizures, stroke with residual L sided weakness, asthma, depression, and anxiety.  Patient presented to [facility] on 4/11/18 with acute onset right sided weakness and facial droop.  On arrival, found to be hypertensive.  CTH without acute pathology; however, not tPA candidate 2/2 Eliquis.  CTA head and neck limited; however, without definite evidence of major vascular occlusion.  Unable to obtain MRI 2/2 pacemaker.     Functional History: Patient lives in [place] with her , daughter, and 2-year-old grandson in a 3rd floor apartment with elevator access to enter.  Prior to admission, she was (I) with ADLs and mod(I) with mobility.  Ambulated with cane.  She is right handed.  She does not drive; not currently working.  Participated in IRF program after previous stroke.  DME: cane.

## 2018-04-12 NOTE — PT/OT/SLP EVAL
"Occupational Therapy   Evaluation    Name: Amna Chawla  MRN: [id number]  Admitting Diagnosis:  RUE and RLE weakness      Recommendations:     Discharge Recommendations: outpatient OT, outpatient PT  Discharge Equipment Recommendations:   (TBD pending progress)  Barriers to discharge:  None    History:     Occupational Profile:  Living Environment: Pt lives with , older daughter, ten year old daughter, and grandson in 3rd floor apartment of a senior [facility] establishment; elevator access available.  Pt reports she usually tries to do one flight of stairs.  Bathroom contains tub/shower.  Previous level of function: Pt reports being (I) with all ADLs and was utilizing a SPC for mobility.    Roles and Routines: Wife, mother.  Pt enjoys crafts and making wreaths.  Equipment Owned:  none  Assistance upon Discharge:  able to provide assist on days he isn't working.  She reports someone is always there with her.    Past Medical History:   Diagnosis Date    Anxiety     Asthma     Brain anoxic injury     Coronary artery disease     Defibrillator activation 2013    Depression     History of sudden cardiac arrest 2/2013    PEA arrest with subsequent long-QT    Hx of psychiatric care     Hypertension     Left atrial enlargement 4/11/2018    Pacemaker 2013    Psychiatric problem     Seizures     Sleep difficulties     Stroke     weakness lt side    Therapy        Past Surgical History:   Procedure Laterality Date    breast reduction      BREAST SURGERY      CARDIAC DEFIBRILLATOR PLACEMENT      CARDIAC DEFIBRILLATOR PLACEMENT      CARPAL TUNNEL RELEASE Right     COSMETIC SURGERY      HERNIA REPAIR      HIATAL HERNIA REPAIR      INSERT / REPLACE / REMOVE PACEMAKER      TUBAL LIGATION         Subjective     "My right leg is dragging a little bit."    Chief Complaint: Headache, impaired balance  Patient/Family stated goals: Resume PLOF  Communicated with: RN prior to " "session.  Pain/Comfort:  · Pain Rating 1:  ("20" for headache)  · Pain Rating Post-Intervention 1:  (20 for headaches)    Patients cultural, spiritual, Advent conflicts given the current situation: none stated    Objective:     Patient found with:  (resting comfortably in bed)    General Precautions: Standard, fall   Orthopedic Precautions:N/A   Braces: N/A     Occupational Performance:    Bed Mobility:    · Patient completed Rolling/Turning to Left with  supervision  · Patient completed Scooting/Bridging with supervision  · Patient completed Supine to Sit with supervision  · Patient completed Sit to Supine with supervision    Functional Mobility/Transfers:  · Patient completed Sit <> Stand Transfer with contact guard assistance  with  no assistive device x 1 trial from EOB.  · Functional Mobility: Pt ambulated 15 ft within room with SBA-CGA.  Mild gait instability noted with pt reporting difficulty fully advancing RLE.    Activities of Daily Living:  · Grooming: SBA for simulation of washing face while standing next to bed.  · LB Dressing: supervision for doffing/donning sock while seated EOB.    Cognitive/Visual Perceptual:  Cognitive/Psychosocial Skills:    -       Oriented to: Person, Place, Time and Situation   -       Follows Commands/attention:Follows multistep  commands  -       Communication: clear/fluent  -       Memory: No Deficits noted  -       Safety awareness/insight to disability: intact   -       Mood/Affect/Coping skills/emotional control: Appropriate to situation    Physical Exam:  Postural examination/scapula alignment:    -       No postural abnormalities identified  Skin integrity: Visible skin intact  Edema:  None noted  Sensation: -       Intact  Motor Planning: -       WfL  Dominant hand: -       R  Upper Extremity Range of Motion:    -       Right Upper Extremity: WFL  -       Left Upper Extremity: WFL  Upper Extremity Strength:   -       Right Upper Extremity: WFL; grossly 4/5 for all " "muscle groups  -       Left Upper Extremity: WFL; grossly 4/5 for all muscle groups   Strength: 4/5 both hands  Fine Motor Coordination: -       Intact  Gross motor coordination:   WFL    Balance: Seated- supervision; Standing- SBA/CGA    Patient left HOB elevated with call button in reach    [facility] 6 Click:  [facility] Total Score: 22    Treatment & Education:  *Pt educated on role of OT and POC discussed  *Pt safe to t/f and ambulate with 1 person assist; CGA  Education:    Assessment:     Amna Chawla is a 52 y.o. female with a medical diagnosis of RUE and RLE weakness.  She presents with the following performance deficits affecting function: impaired endurance, gait instability, impaired balance, impaired self care skills.  Pt demonstrates strength and ROM in (B) UE needed for ADLs that is WFL with RUE just slightly weaker than LUE.  Pt able to perform grooming task in standing with SBA and ambulate with CGA-SBA.  PTA pt reports being (I) with all ADLs and mobility.  Pt is near baseline, but demonstrates deficits with balance impacting her ability to safely perform daily routine and mobilize around home and community.  Pt would benefit from skilled OT services to address problems listed below and increase independence with ADLs.  OP OT/PT is recommended upon d/c from hospital.        Rehab Prognosis:  Good; patient would benefit from acute skilled OT services to address these deficits and reach maximum level of function.         Clinical Decision Makin.  OT Low:  "Pt evaluation falls under low complexity for evaluation coding due to performance deficits noted in 1-3 areas as stated above and 0 co-morbities affecting current functional status. Data obtained from problem focused assessments. No modifications or assistance was required for completion of evaluation. Only brief occupational profile and history review completed."     Plan:     Patient to be seen 3 x/week to address the above listed problems " via self-care/home management, therapeutic activities, therapeutic exercises  · Plan of Care Expires: 05/12/18  · Plan of Care Reviewed with: patient    This Plan of care has been discussed with the patient who was involved in its development and understands and is in agreement with the identified goals and treatment plan    GOALS:    Occupational Therapy Goals        Problem: Occupational Therapy Goal    Goal Priority Disciplines Outcome Interventions   Occupational Therapy Goal     OT, PT/OT     Description:  Goals to be met by: 4/19/2018     Patient will increase functional independence with ADLs by performing:    UE Dressing with [place].  LE Dressing with [place].  Grooming while standing with Supervision.  Toileting from toilet with Supervision for hygiene and clothing management.   Toilet transfer to toilet with Supervision.  Pt will perform functional task in standing with no instances of LOB noted.                      Time Tracking:     OT Date of Treatment: 04/12/18  OT Start Time: 0852  OT Stop Time: 0900  OT Total Time (min): 8 min    Billable Minutes:Evaluation 8    YUDI Lisa  4/12/2018

## 2018-04-12 NOTE — PLAN OF CARE
04/12/2018      Amna Chawla  [address]  [place] [address]          Vascular Neurology Dept.  [facility] [address] [facility] [address] [phone] after hours  [phone] fax Diagnosis:  TIA (transient ischemic attack)                         Debility        Please evaluate and treat for all PT and OT needs. Thank you,                    __________________________  Ella Adams, IDA  04/12/2018

## 2018-04-13 NOTE — ASSESSMENT & PLAN NOTE
Pt with hx BENJAMIN, follows with Dr. Cortez in clinic; Receives botox injections  States in past, has gone to infusion clinic for cocktail when HA particularly bad  Discussed with Dr. Cortez's nurse- pt usually receives IV Magnesium, IM Toradol, IV Benadryl, NS bolus, +/- 2mg IV Morphine based on pt's BP  Given above cocktail here minus Benadryl as she had already received today  HA improved; Encouraged pt to follow up with Dr. Cortez for continued management     Please avoid triptans in the future, given hx acute ischemic stroke

## 2018-04-13 NOTE — ASSESSMENT & PLAN NOTE
Stroke risk factor  Was on Coumadin, switched to Eliquis on 4/3/18  Staff concerned that pt had an event while on Eliquis  Attempted switch to Pradaxa but elected for Xarelto 20mg qNightly instead per pt's insurance coverage  Telemetry

## 2018-04-13 NOTE — HOSPITAL COURSE
Ms. Chawla was admitted for acute stroke workup. Pt with pacemaker so unable to obtain MRI Brain; CTA H/N demonstrated no evidence acute infarction, though did exhibit noncalcified plaquing of carotid bifurcations and proximal ICAs with < 50% stenosis, so Plavix was added to pt's daily home regimen. Concerned for TIA at this time though Migraine very high on differential due to pt's hx headaches, including presently this admission. Hypertensive urgency/emergency also considered due to pt's very elevated levels with EMS. Per chart review, Eliquis was a new medication since 4/3/18 (had switched from Coumadin), however staff Faraz concerned that pt had a potential event while on Eliquis. She wished to switch to Pradaxa as an alternative DOAC (as it has an option for reversal), however changed to Xarelto per pt's insurance coverage.   While admitted, pt given cocktail for HA which she has received previously at the infusion clinic - IV Magnesium, IM Toradol, 2mg IV Morphine, 500cc NS bolus (IV Benadryl not included this time as pt had received a dose earlier that day prior to contrast imaging.) HA improved somewhat and pt was encouraged to follow up with Dr. Cortez for continued management of botox injections, etc. At that time, pt also found with hyponatremia; d/c'd Clorthalidone and plan was made for pt to follow [facility] in [facility] on Monday 4/16/18 for repeat CMP to evaluate Na level and BP check since discontinuing this medication.  Ms. Chawla was evaluated and treated by PT, OT, and SLP who recommend d/c with outpatient PT and OT. She was discharged home 4/12/18 with [facility] follow-up Monday and Vascular Neurology follow-up in 4-6 weeks.

## 2018-04-13 NOTE — ASSESSMENT & PLAN NOTE
Na 133  Discussed with Hospital Med - D/c home clorthalidone  Pt to follow up in [facility] early next week for BMP and reassessment of BP

## 2018-04-13 NOTE — PROGRESS NOTES
[facility]  Vascular Neurology  Comprehensive Stroke Center  Progress Note    Assessment/Plan:     * TIA (transient ischemic attack)    53 yo woman with PMHx AFib on Eliquis (recently switch from Coumadin 4/3/18), prior cardiac arrest with associated stroke (residual mild L sided weakness), CAD with ICD in situ, HTN, and HLD presenting with acute R-sided weakness (including face) and sensation changes. SBP en route 200/100.  No tPA 2/2 Eliquis use. Low suspicion for LVO. Admitted to  for observation overnight.      CTA H/N with no evidence of acute infarct. Concern for TIA, though migraine also possible cause of pt's presentation. Noncalcified plaquing of carotid bifurcations and proximal ICAs with < 50% stenosis; adding Plavix.   Staff concerned that pt had an event while on Eliquis; wanted to try Pradaxa but switched to Xarelto per pt's insurance coverage.     D/c'd home Clorthalidone due to hyponatremia; [facility] with [facility] on Monday.     Pt discharged home 4/12/18 with outpatient PT, OT.      Antithrombotics for secondary stroke prevention: Anticoagulants: Rivaroxaban 20 mg daily  Statins for secondary stroke prevention and hyperlipidemia, if present:   Statins: Atorvastatin- 40 mg daily  Aggressive risk factor modification: HTN, HLD, Diet, Exercise, Obesity, A-Fib, CAD  Rehab efforts: PT/OT/SLP to evaluate and treat  Diagnostics ordered/pending: None   VTE prophylaxis: None: Reason for No Pharmacological VTE Prophylaxis: Currently on anticoagulation  BP parameters: TIA: SBP < 160        Hyponatremia    Na 133  Discussed with [facility] - D/c home clorthalidone  Pt to follow [facility] in [facility] early next week for BMP and reassessment of BP        Chronic migraine without aura, with intractable migraine, so stated, with status migrainosus    Pt with hx BENJAMIN, follows with Dr. Cortez in clinic; Receives botox injections  States in past, has gone to infusion clinic for  cocktail when HA particularly bad  Discussed with Dr. Cortez's nurse- pt usually receives IV Magnesium, IM Toradol, IV Benadryl, NS bolus, +/- 2mg IV Morphine based on pt's BP  Given above cocktail here minus Benadryl as she had already received today  HA improved; Encouraged pt to follow up with Dr. Cortez for continued management     Please avoid triptans in the future, given hx acute ischemic stroke        Left atrial enlargement    As seen on TTE 2/22/2018; No repeat ordered due to recent result  Known afib. Switching Eliquis to Xarelto        Paroxysmal atrial fibrillation    Stroke risk factor  Was on Coumadin, switched to Eliquis on 4/3/18  Staff concerned that pt had an event while on Eliquis  Attempted switch to Pradaxa but elected for Xarelto 20mg qNightly instead per pt's insurance coverage  Telemetry        Essential hypertension    Stroke RF  SBP goal < 220 acutely, BP meds held  As no acute infarct on imaging, BP meds restarted with good response  Clorthalidone d/c'd in setting of hyponatremia  Plan for [facility] to re-evaluate BP regimen at that time        HLD (hyperlipidemia)    Stroke RF  LDL 95.2 today  Continue atorvastatin 40mg daily  Consider dose increase at outpatient follow-up        Depression, major, recurrent, moderate    Consider tapering Desipramine if pt has another stroke in future  Continue Cymbalta        Pacemaker    / ICD in situ   Not MRI compatible             4/12/18: Adding Plavix due to CTA result. Staff concerned that pt had an event while on Eliquis; switching to Xarelto. Pt given cocktail for BENJAMIN she has gotten in infusion clinic in past. D/c'd Clorthalidone due to hyponatremia; pt [facility]. Pt discharged home with outpatient PT, OT.    STROKE DOCUMENTATION        NIH Scale:  1a. Level Of Consciousness: 0-->Alert: keenly responsive  1b. LOC Questions: 0-->Answers both questions correctly  1c. LOC Commands: 0-->Performs both tasks  correctly  2. Best Gaze: 0-->Normal  3. Visual: 0-->No visual loss  4. Facial Palsy: 1-->Minor paralysis (flattened nasolabial fold, asymmetry on smiling)  5a. Motor Arm, Left: 0-->No drift: limb holds 90 (or 45) degrees for full 10 secs  5b. Motor Arm, Right: 0-->No drift: limb holds 90 (or 45) degrees for full 10 secs  6a. Motor Leg, Left: 0-->No drift: leg holds 30 degree position for full 5 secs  6b. Motor Leg, Right: 0-->No drift: leg holds 30 degree position for full 5 secs  7. Limb Ataxia: 0-->Absent  8. Sensory: 1-->Mild-to-moderate sensory loss: patient feels pinprick is less sharp or is dull on the affected side: or there is a loss of superficial pain with pinprick, but patient is aware of being touched  9. Best Language: 0-->No aphasia: normal  10. Dysarthria: 0-->Normal  11. Extinction and Inattention (formerly Neglect): 0-->No abnormality  Total (NIH Stroke Scale): 2       Modified [place] Score: 4 (uses a wheelchair or walker at baseline)  [place] Coma Scale:    ABCD2 Score:    [id number] Score:6  HAS -BLED Score:   ICH Score:   [employer] Classification:      Hemorrhagic change of an Ischemic Stroke: Does this patient have an ischemic stroke with hemorrhagic changes? No     Neurologic Chief Complaint: Right-sided tingling    Subjective:     Interval History: Patient is seen for follow-up neurological assessment and treatment recommendations: Adding Plavix due to CTA result. Staff concerned that pt had an event while on Eliquis; switching to Xarelto. Pt given cocktail for BENJAMIN she has gotten in infusion clinic in past. D/c'd Clorthalidone due to hyponatremia; [facility]. Pt discharged home with outpatient PT, OT.    HPI, Past Medical, Family, and Social History remains the same as documented in the initial encounter.     Review of Systems   Constitutional: Negative for chills and fever.   Eyes: Negative for discharge and visual disturbance.   Neurological: Positive for facial  asymmetry and headaches. Negative for speech difficulty.   Psychiatric/Behavioral: Negative for agitation and confusion.     Scheduled Meds:   ARIPiprazole  5 mg Oral QHS    atorvastatin  40 mg Oral QAM    carvedilol  25 mg Oral BID WM    desipramine  25 mg Oral Daily    donepezil  10 mg Oral BID    DULoxetine  60 mg Oral BID    gabapentin  900 mg Oral TID    rivaroxaban  20 mg Oral Daily with dinner    sodium chloride 0.9%  500 mL Intravenous Once    sodium chloride 0.9%  3 mL Intravenous Q8H    valsartan  80 mg Oral QHS     Continuous Infusions:   sodium chloride 0.9%       PRN Meds:acetaminophen, labetalol, ondansetron, oxyCODONE, sodium chloride 0.9%, tiZANidine    Objective:     Vital Signs (Most Recent):  Temp: 96.1 °F (35.6 °C) (04/12/18 1241)  Pulse: 104 (04/12/18 1907)  Resp: 16 (04/12/18 1241)  BP: (!) 158/87 (04/12/18 1907)  SpO2: (!) 94 % (04/12/18 1241)  BP Location: Left arm    Vital Signs Range (Last 24H):  Temp:  [96.1 °F (35.6 °C)-97.8 °F (36.6 °C)]   Pulse:  []   Resp:  [16-18]   BP: (146-188)/()   SpO2:  [94 %-97 %]   BP Location: Left arm    Physical Exam   Constitutional: She is oriented to person, place, and time. She appears well-developed and well-nourished. No distress.   HENT:   Head: Normocephalic and atraumatic.   Eyes: Conjunctivae and EOM are normal.   Cardiovascular: Normal rate.    Pulmonary/Chest: Effort normal. No respiratory distress.   Musculoskeletal: Normal range of motion. She exhibits no edema.   Neurological: She is alert and oriented to person, place, and time. A cranial nerve deficit (CN 7) is present. No sensory deficit.   Skin: Skin is warm and dry.   Psychiatric: She has a normal mood and affect. Her behavior is normal.       Neurological Exam:   LOC: alert  Attention Span: Good   Language: No aphasia  Articulation: No dysarthria  Orientation: Person, Place, Time   Visual Fields: Full  EOM (CN III, IV, VI): Full/intact  Facial Movement (CN VII):  Lower facial weakness on the Right  Motor: Arm left  Paresis: 4/5  Leg left  Normal 5/5  Arm right  Paresis: 4/5  Leg right Paresis: 4/5  Cebellar: No evidence of appendicular or axial ataxia  Sensation: Intact to light touch, temperature  Tone: Normal tone throughout    Laboratory:  CMP:   Recent Labs  Lab 04/12/18  0510   CALCIUM 9.7   ALBUMIN 3.2*   PROT 9.7*   *   K 3.9   CO2 22*      BUN 15   CREATININE 0.9   ALKPHOS 66   ALT 29   AST 18   BILITOT 0.3     CBC:   Recent Labs  Lab 04/12/18  0510   WBC 6.08   RBC 4.05   HGB 12.1   HCT 36.1*      MCV 89   MCH 29.9   MCHC 33.5     Lipid Panel:   Recent Labs  Lab 04/11/18  1436   CHOL 159   LDLCALC 95.2   HDL 38*   TRIG 129     Coagulation:   Recent Labs  Lab 04/12/18  0510   INR 1.0   APTT 21.5     Platelet Aggregation Study: No results for input(s): PLTAGG, PLTAGINTERP, PLTAGREGLACO, ADPPLTAGGREG in the last 168 hours.  Hgb A1C:   Recent Labs  Lab 04/11/18  1436   HGBA1C 5.6     TSH:   Recent Labs  Lab 04/11/18  1436   TSH 0.745         Diagnostic Results       Brain/Vessel Imaging     CTA Head/Neck 4/12/18  CTA head: Unremarkable CTA of the head specifically without evidence for proximal significant stenosis or occlusion.  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  CTA neck: Noncalcified plaquing of the carotid bifurcations and proximal ICAs with less than 50% proximal ICA stenosis by NASCET criteria.  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  CT head: Stable small right cerebellar remote infarct.  No evidence for acute intracranial hemorrhage or definite abnormal parenchymal enhancement.      Rabia Farrell PA-C  [facility]  Department of Vascular Neurology   [facility]

## 2018-04-13 NOTE — ASSESSMENT & PLAN NOTE
Stroke RF  LDL 95.2 today  Continue atorvastatin 40mg daily  Consider dose increase at outpatient follow-up

## 2018-04-13 NOTE — ASSESSMENT & PLAN NOTE
Stroke RF  SBP goal < 220 acutely, BP meds held  As no acute infarct on imaging, BP meds restarted with good response  Clorthalidone d/c'd in setting of hyponatremia  Plan for [facility] to re-evaluate BP regimen at that time

## 2018-04-13 NOTE — SUBJECTIVE & OBJECTIVE
Neurologic Chief Complaint: Right-sided tingling    Subjective:     Interval History: Patient is seen for follow-up neurological assessment and treatment recommendations: Adding Plavix due to CTA result. Staff concerned that pt had an event while on Eliquis; switching to Xarelto. Pt given cocktail for BENJAMIN she has gotten in infusion clinic in past. D/c'd Clorthalidone due to hyponatremia; [facility]. Pt discharged home with outpatient PT, OT.    HPI, Past Medical, Family, and Social History remains the same as documented in the initial encounter.     Review of Systems   Constitutional: Negative for chills and fever.   Eyes: Negative for discharge and visual disturbance.   Neurological: Positive for facial asymmetry and headaches. Negative for speech difficulty.   Psychiatric/Behavioral: Negative for agitation and confusion.     Scheduled Meds:   ARIPiprazole  5 mg Oral QHS    atorvastatin  40 mg Oral QAM    carvedilol  25 mg Oral BID WM    desipramine  25 mg Oral Daily    donepezil  10 mg Oral BID    DULoxetine  60 mg Oral BID    gabapentin  900 mg Oral TID    rivaroxaban  20 mg Oral Daily with dinner    sodium chloride 0.9%  500 mL Intravenous Once    sodium chloride 0.9%  3 mL Intravenous Q8H    valsartan  80 mg Oral QHS     Continuous Infusions:   sodium chloride 0.9%       PRN Meds:acetaminophen, labetalol, ondansetron, oxyCODONE, sodium chloride 0.9%, tiZANidine    Objective:     Vital Signs (Most Recent):  Temp: 96.1 °F (35.6 °C) (04/12/18 1241)  Pulse: 104 (04/12/18 1907)  Resp: 16 (04/12/18 1241)  BP: (!) 158/87 (04/12/18 1907)  SpO2: (!) 94 % (04/12/18 1241)  BP Location: Left arm    Vital Signs Range (Last 24H):  Temp:  [96.1 °F (35.6 °C)-97.8 °F (36.6 °C)]   Pulse:  []   Resp:  [16-18]   BP: (146-188)/()   SpO2:  [94 %-97 %]   BP Location: Left arm    Physical Exam   Constitutional: She is oriented to person, place, and time. She appears well-developed and  well-nourished. No distress.   HENT:   Head: Normocephalic and atraumatic.   Eyes: Conjunctivae and EOM are normal.   Cardiovascular: Normal rate.    Pulmonary/Chest: Effort normal. No respiratory distress.   Musculoskeletal: Normal range of motion. She exhibits no edema.   Neurological: She is alert and oriented to person, place, and time. A cranial nerve deficit (CN 7) is present. No sensory deficit.   Skin: Skin is warm and dry.   Psychiatric: She has a normal mood and affect. Her behavior is normal.       Neurological Exam:   LOC: alert  Attention Span: Good   Language: No aphasia  Articulation: No dysarthria  Orientation: Person, Place, Time   Visual Fields: Full  EOM (CN III, IV, VI): Full/intact  Facial Movement (CN VII): Lower facial weakness on the Right  Motor: Arm left  Paresis: 4/5  Leg left  Normal 5/5  Arm right  Paresis: 4/5  Leg right Paresis: 4/5  Cebellar: No evidence of appendicular or axial ataxia  Sensation: Intact to light touch, temperature  Tone: Normal tone throughout    Laboratory:  CMP:   Recent Labs  Lab 04/12/18  0510   CALCIUM 9.7   ALBUMIN 3.2*   PROT 9.7*   *   K 3.9   CO2 22*      BUN 15   CREATININE 0.9   ALKPHOS 66   ALT 29   AST 18   BILITOT 0.3     CBC:   Recent Labs  Lab 04/12/18  0510   WBC 6.08   RBC 4.05   HGB 12.1   HCT 36.1*      MCV 89   MCH 29.9   MCHC 33.5     Lipid Panel:   Recent Labs  Lab 04/11/18  1436   CHOL 159   LDLCALC 95.2   HDL 38*   TRIG 129     Coagulation:   Recent Labs  Lab 04/12/18  0510   INR 1.0   APTT 21.5     Platelet Aggregation Study: No results for input(s): PLTAGG, PLTAGINTERP, PLTAGREGLACO, ADPPLTAGGREG in the last 168 hours.  Hgb A1C:   Recent Labs  Lab 04/11/18  1436   HGBA1C 5.6     TSH:   Recent Labs  Lab 04/11/18  1436   TSH 0.745         Diagnostic Results       Brain/Vessel Imaging     CTA Head/Neck 4/12/18  CTA head: Unremarkable CTA of the head specifically without evidence for proximal significant stenosis or  occlusion.  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  CTA neck: Noncalcified plaquing of the carotid bifurcations and proximal ICAs with less than 50% proximal ICA stenosis by NASCET criteria.  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  CT head: Stable small right cerebellar remote infarct.  No evidence for acute intracranial hemorrhage or definite abnormal parenchymal enhancement.

## 2018-04-13 NOTE — PT/OT/SLP DISCHARGE
Physical Therapy Discharge Summary    Name: Amna Chawla  MRN: [id number]   Principal Problem: TIA (transient ischemic attack)     Patient Discharged from acute Physical Therapy on 4/13/18.  Please refer to prior PT noted date on 4/12/18 for functional status.     Assessment:     Patient appropriate for care in another setting. Patient has not met goals.    Objective:     GOALS:    Physical Therapy Goals        Problem: Physical Therapy Goal    Goal Priority Disciplines Outcome Goal Variances Interventions   Physical Therapy Goal     PT/OT, PT Ongoing (interventions implemented as appropriate)     Description:  Goals to be met by: 4/20/2018    Patient will increase functional independence with mobility by performing:    Supine to sit with Modified Young.  Sit to supine with Modified Young.   Sit to stand transfer with Modified Young using Straight Cane.  Bed to chair transfer via Stand Pivot with Modified Young using Straight Cane.  Gait  x 150 feet with Supervision using Straight Cane.    Able to tolerate exercise for 15-20 reps with independence.  Patient and family able to teachback stroke & positioning education independently.                      Reasons for Discontinuation of Therapy Services  Transfer to alternate level of care.      Plan:     Patient Discharged to: Home with Home Health Service.    Tiara Calix, PT  4/13/2018

## 2018-04-13 NOTE — ASSESSMENT & PLAN NOTE
As seen on TTE 2/22/2018; No repeat ordered due to recent result  Known afib. Switching Eliquis to Xarelto

## 2018-04-16 ENCOUNTER — OFFICE VISIT (OUTPATIENT)
Dept: INTERNAL MEDICINE | Facility: CLINIC | Age: 53
End: 2018-04-16
Payer: MEDICARE

## 2018-04-16 VITALS
BODY MASS INDEX: 46.9 KG/M2 | WEIGHT: 274.69 LBS | SYSTOLIC BLOOD PRESSURE: 131 MMHG | HEIGHT: 64 IN | DIASTOLIC BLOOD PRESSURE: 70 MMHG | HEART RATE: 82 BPM

## 2018-04-16 DIAGNOSIS — E66.01 OBESITY, CLASS III, BMI 40-49.9 (MORBID OBESITY): ICD-10-CM

## 2018-04-16 DIAGNOSIS — K21.9 GASTROESOPHAGEAL REFLUX DISEASE WITHOUT ESOPHAGITIS: ICD-10-CM

## 2018-04-16 DIAGNOSIS — Z79.01 LONG TERM (CURRENT) USE OF ANTICOAGULANTS: ICD-10-CM

## 2018-04-16 DIAGNOSIS — E04.1 THYROID CYST: Chronic | ICD-10-CM

## 2018-04-16 DIAGNOSIS — G45.8 OTHER SPECIFIED TRANSIENT CEREBRAL ISCHEMIAS: ICD-10-CM

## 2018-04-16 DIAGNOSIS — G47.8 UPPER AIRWAY RESISTANCE SYNDROME: ICD-10-CM

## 2018-04-16 DIAGNOSIS — E87.1 HYPONATREMIA: Primary | ICD-10-CM

## 2018-04-16 DIAGNOSIS — I48.0 PAROXYSMAL ATRIAL FIBRILLATION: ICD-10-CM

## 2018-04-16 DIAGNOSIS — Z09 HOSPITAL DISCHARGE FOLLOW-UP: ICD-10-CM

## 2018-04-16 DIAGNOSIS — G43.711 CHRONIC MIGRAINE WITHOUT AURA, WITH INTRACTABLE MIGRAINE, SO STATED, WITH STATUS MIGRAINOSUS: ICD-10-CM

## 2018-04-16 PROCEDURE — 99999 PR PBB SHADOW E&M-EST. PATIENT-LVL V: CPT | Mod: PBBFAC,GC,, | Performed by: INTERNAL MEDICINE

## 2018-04-16 PROCEDURE — 99215 OFFICE O/P EST HI 40 MIN: CPT | Mod: PBBFAC | Performed by: INTERNAL MEDICINE

## 2018-04-16 PROCEDURE — 99215 OFFICE O/P EST HI 40 MIN: CPT | Mod: S$PBB,GC,, | Performed by: INTERNAL MEDICINE

## 2018-04-16 RX ORDER — ONDANSETRON 4 MG/1
4 TABLET, ORALLY DISINTEGRATING ORAL
Qty: 8 TABLET | Refills: 0 | Status: SHIPPED | OUTPATIENT
Start: 2018-04-16 | End: 2018-07-24 | Stop reason: SDUPTHER

## 2018-04-16 NOTE — DISCHARGE SUMMARY
[facility]  Vascular Neurology  Comprehensive Stroke Center  Discharge Summary     Summary:     Admit Date: 4/11/2018  2:03 PM    Discharge Date and Time: 4/12/2018  8:30 PM    Attending Physician: Perlita Ruth MD    Discharge Provider: Rabia Farrell PA-C    History of Present Illness: Ms. Chawla is a 51 yo F with afib on Eliquis (last dose this am), prior cardiac arrest with associated acute ischemic stroke with residual mild L sided weakness, CAD with ICD in situ, HTN, and HLD who presented with acute R sided weakness and sensation changes.  She originally called EMS for palpitations, but developed acute right sided facial droop and RUE weakness at 1:40pm today, after EMS had arrived.  She denies changes to speech or vision.  SBP en route 200/100.  She is ineligible for tPA 2/2 Eliquis use.  She is not suspected to have an LVO.  She will be admitted to VN for observation overnight.      Hospital Course (synopsis of major diagnoses, care, treatment, and services provided during the course of the hospital stay): Ms. Chawla was admitted for acute stroke workup. Pt with pacemaker so unable to obtain MRI Brain; CTA H/N demonstrated no evidence acute infarction, though did exhibit noncalcified plaquing of carotid bifurcations and proximal ICAs with < 50% stenosis, so Plavix was added to pt's daily home regimen. Concerned for TIA at this time though Migraine very high on differential due to pt's hx headaches, including presently this admission. Hypertensive urgency/emergency also considered due to pt's very elevated levels with EMS. Per chart review, Eliquis was a new medication since 4/3/18 (had switched from Coumadin), however staff Faraz concerned that pt had a potential event while on Eliquis. She wished to switch to Pradaxa as an alternative DOAC (as it has an option for reversal), however changed to Xarelto per pt's insurance coverage.   While admitted, pt given cocktail for HA  which she has received previously at the infusion clinic - IV Magnesium, IM Toradol, 2mg IV Morphine, 500cc NS bolus (IV Benadryl not included this time as pt had received a dose earlier that day prior to contrast imaging.) HA improved somewhat and pt was encouraged to follow up with Dr. Cortez for continued management of botox injections, etc. At that time, pt also found with hyponatremia; d/c'd Clorthalidone and plan was made for pt to [facility] on Monday 4/16/18 for repeat CMP to evaluate Na level and BP check since discontinuing this medication.  Ms. Chawla was evaluated and treated by PT, OT, and SLP who recommend d/c with outpatient PT and OT. She was discharged home 4/12/18 with [facility] follow-up Monday and Vascular Neurology follow-up in 4-6 weeks.    STROKE DOCUMENTATION         NIH Scale:  1a. Level Of Consciousness: 0-->Alert: keenly responsive  1b. LOC Questions: 0-->Answers both questions correctly  1c. LOC Commands: 0-->Performs both tasks correctly  2. Best Gaze: 0-->Normal  3. Visual: 0-->No visual loss  4. Facial Palsy: 1-->Minor paralysis (flattened nasolabial fold, asymmetry on smiling)  5a. Motor Arm, Left: 0-->No drift: limb holds 90 (or 45) degrees for full 10 secs  5b. Motor Arm, Right: 0-->No drift: limb holds 90 (or 45) degrees for full 10 secs  6a. Motor Leg, Left: 0-->No drift: leg holds 30 degree position for full 5 secs  6b. Motor Leg, Right: 0-->No drift: leg holds 30 degree position for full 5 secs  7. Limb Ataxia: 0-->Absent  8. Sensory: 1-->Mild-to-moderate sensory loss: patient feels pinprick is less sharp or is dull on the affected side: or there is a loss of superficial pain with pinprick, but patient is aware of being touched  9. Best Language: 0-->No aphasia: normal  10. Dysarthria: 0-->Normal  11. Extinction and Inattention (formerly Neglect): 0-->No abnormality  Total (NIH Stroke Scale): 2        Modified McIntosh Score: 4 (uses a wheelchair or walker  at baseline)  Brian Coma Scale:    ABCD2 Score:    [id number] Score:6  HAS -BLED Score:   ICH Score:   Hunt & Hearn Classification:       Assessment/Plan:       Diagnostic Results         Brain/Vessel Imaging      CTA Head/Neck 4/12/18  CTA head: Unremarkable CTA of the head specifically without evidence for proximal significant stenosis or occlusion.  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  CTA neck: Noncalcified plaquing of the carotid bifurcations and proximal ICAs with less than 50% proximal ICA stenosis by NASCET criteria.  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  CT head: Stable small right cerebellar remote infarct.  No evidence for acute intracranial hemorrhage or definite abnormal parenchymal enhancement.        Interventions: None    Complications: None    Disposition: Home or Self Care    Final Active Diagnoses:    Diagnosis Date Noted POA    PRINCIPAL PROBLEM:  TIA (transient ischemic attack) [G45.9] 04/12/2018 Unknown    Hyponatremia [E87.1] 04/12/2018 Unknown    Chronic migraine without aura, with intractable migraine, so stated, with status migrainosus [G43.711] 08/28/2014 Yes    Left atrial enlargement [I51.7] 04/11/2018 Unknown    Paroxysmal atrial fibrillation [I48.0] 03/09/2017 Yes    Essential hypertension [I10] 05/30/2015 Yes    HLD (hyperlipidemia) [E78.5] 09/05/2013 Yes    Depression, major, recurrent, moderate [F33.1] 04/03/2018 Yes    Pacemaker [Z95.0] 09/05/2013 Yes      Problems Resolved During this Admission:    Diagnosis Date Noted Date Resolved POA     * TIA (transient ischemic attack)    53 yo woman with PMHx AFib on Eliquis (recently switch from Coumadin 4/3/18), prior cardiac arrest with associated stroke (residual mild L sided weakness), CAD with ICD in situ, HTN, and HLD presenting with  acute R-sided weakness (including face) and sensation changes. SBP en route 200/100. No tPA 2/2 Eliquis use. Low suspicion for LVO. Admitted to VN for observation overnight.      CTA H/N with no evidence of acute infarct. Concern for TIA, though migraine also possible cause of pt's presentation. Noncalcified plaquing of carotid bifurcations and proximal ICAs with < 50% stenosis; adding Plavix.   Staff concerned that pt had an event while on Eliquis; wanted to try Pradaxa but switched to Xarelto per pt's insurance coverage.     D/c'd home Clorthalidone due to hyponatremia; pt to [facility] in [facility] with CMP on Monday.     Pt discharged home 4/12/18 with outpatient PT, OT.      Antithrombotics for secondary stroke prevention: Antiplatelets: Clopidogrel: 75 mg daily  Anticoagulants: Rivaroxaban 20 mg daily  Statins for secondary stroke prevention and hyperlipidemia, if present:   Statins: Atorvastatin- 40 mg daily  Aggressive risk factor modification: HTN, HLD, Diet, Exercise, Obesity, A-Fib, CAD  Rehab efforts: Outpatient PT, OT  BP parameters: TIA: SBP < 160; Parameters to be tightened on an outpatient basis since discontinuation of Clorthalidone        Hyponatremia    Na 133  Discussed with Hospital Med - D/c home clorthalidone  Pt to follow up in [facility] early next week for BMP and reassessment of BP        Chronic migraine without aura, with intractable migraine, so stated, with status migrainosus    Pt with hx BENJAMIN, follows with Dr. Cortez in clinic; Receives botox injections  States in past, has gone to infusion clinic for cocktail when HA particularly bad  Discussed with Dr. Cortez's nurse- pt usually receives IV Magnesium, IM Toradol, IV Benadryl, NS bolus, +/- 2mg IV Morphine based on pt's BP  Given above cocktail here minus Benadryl as she had already received today  HA improved; Encouraged pt to follow up with Dr. Cortez for continued management     Please avoid triptans in the future, given hx  acute ischemic stroke        Left atrial enlargement    As seen on TTE 2/22/2018; No repeat ordered due to recent result  Known afib. Switching Eliquis to Xarelto        Paroxysmal atrial fibrillation    Stroke risk factor  Was on Coumadin, switched to Eliquis on 4/3/18  Staff concerned that pt had an event while on Eliquis  Attempted switch to Pradaxa but elected for Xarelto 20mg qNightly instead per pt's insurance coverage        Essential hypertension    Stroke RF  BP meds held acutely  As no acute infarct on imaging, BP meds restarted with good response  Clorthalidone d/c'd in setting of hyponatremia  Plan for near follow-up with [facility] to re-evaluate BP regimen at that time        HLD (hyperlipidemia)    Stroke RF  LDL 95.2 today  Continue atorvastatin 40mg daily  Consider dose increase at outpatient VN follow-up        Depression, major, recurrent, moderate    Consider tapering Desipramine if pt has another stroke in future  Continue Cymbalta        Pacemaker    / ICD in situ   Not MRI compatible            Recommendations:     Post-discharge complication risks: None    Stroke Education given to: patient and family (over the phone)    Follow-up in Stroke Clinic in 4-6 weeks.     Discharge Plan:  Antithrombotics: Clopidogrel 75mg  Statin: Atorvastatin 40mg  Anticoagulant: Rivaroxaban  Aggresive risk factor modification:  Hypertension  High Cholesterol  Diet  Exercise  Obesity  Atrial Fibrillation  Carotid Artery disease    Follow Up:  Follow-up Information     Kell Nix MD On 4/16/2018.    Specialty:  Internal Medicine  Why:  appt at 2:45 pm  Contact information:  [address]  [facility] [address]  [phone]             [facility].    Specialty:  Neurology  Why:  The office will call you to schedule an appt in 4-6 weeks. If the office does not call you by 4/19/18 please call to schedule an appt.  Contact information:  [address]  [facility]  [address]  [phone]  Additional information:  7th Floor                 Patient Instructions:     COMPREHENSIVE METABOLIC PANEL   Standing Status: Future  Standing Exp. Date: 06/11/19     Ambulatory referral to Outpatient Case Management   Referral Priority: Routine Referral Type: Consultation   Referral Reason: Specialty Services Required    Number of Visits Requested: 1      Ambulatory Referral to  [facility]   Referral Priority: Routine Referral Type: Consultation   Referral Reason: Specialty Services Required    Number of Visits Requested: 1          Medications:  Reconciled Home Medications:      Medication List      START taking these medications    clopidogrel 75 mg tablet  Commonly known as:  PLAVIX  Take 1 tablet (75 mg total) by mouth once daily.     rivaroxaban 20 mg Tab  Commonly known as:  XARELTO  Take 1 tablet (20 mg total) by mouth daily with dinner or evening meal.        CONTINUE taking these medications    ARIPiprazole 5 MG Tab  Commonly known as:  ABILIFY  Take 1 tablet (5 mg total) by mouth every evening.     atorvastatin 40 MG tablet  Commonly known as:  LIPITOR  Take 1 tablet (40 mg total) by mouth every morning.     carvedilol 25 MG tablet  Commonly known as:  COREG  TAKE 1 TABLET(25 MG) BY MOUTH TWICE DAILY WITH MEALS     clonazePAM 1 MG tablet  Commonly known as:  KLONOPIN  Take 1 tablet (1 mg total) by mouth daily as needed for Anxiety.     desipramine 25 MG tablet  Commonly known as:  NORPRAMIN  Take 25 mg by mouth once daily.     donepezil 10 MG tablet  Commonly known as:  ARICEPT  Take 1 tablet (10 mg total) by mouth 2 (two) times daily.     DULoxetine 60 MG capsule  Commonly known as:  CYMBALTA  Take 1 capsule (60 mg total) by mouth 2 (two) times daily.     gabapentin 300 MG capsule  Commonly known as:  NEURONTIN  Take 3 capsules (900 mg total) by mouth 3 (three) times daily.     hydrOXYzine 50 MG tablet  Commonly known as:  ATARAX  Take 1 tablet (50 mg total) by mouth  nightly as needed.     LORazepam 0.5 MG tablet  Commonly known as:  ATIVAN  Take 1 tablet (0.5 mg total) by mouth every 6 (six) hours as needed for Anxiety.     magnesium 200 mg Tab  Take 200 mg by mouth once daily.     ondansetron 4 MG Tbdl  Commonly known as:  ZOFRAN-ODT  Take 1 tablet (4 mg total) by mouth every 24 hours as needed (nausea).     oxyCODONE-acetaminophen  mg per tablet  Commonly known as:  PERCOCET  Take 1 tablet by mouth every 8 (eight) hours as needed for Pain.     pantoprazole 40 MG tablet  Commonly known as:  PROTONIX  Take 1 tablet (40 mg total) by mouth once daily.     tiaGABine 4 MG tablet  Commonly known as:  GABITRIL  Take 1 tablet (4 mg total) by mouth every evening.     topiramate 100 MG tablet  Commonly known as:  TOPAMAX  Take 2 tablets (200 mg total) by mouth 2 (two) times daily.     triamcinolone acetonide 0.1% 0.1 % cream  Commonly known as:  KENALOG  AAA bid to rash up to 2 weeks     valsartan 80 MG tablet  Commonly known as:  DIOVAN  Take 1 tablet (80 mg total) by mouth once daily.     VITAMIN D2 50,000 unit Cap  Generic drug:  ergocalciferol  TAKE 1 CAPSULE BY MOUTH EVERY 7 DAYS     zolpidem 10 mg Tab  Commonly known as:  AMBIEN  Take 1 tablet (10 mg total) by mouth nightly as needed.        STOP taking these medications    apixaban 5 mg Tab     chlorthalidone 25 MG Tab  Commonly known as:  HYGROTEN            Rabia Farrell PA-C  [facility]  Department of Vascular Neurology   [facility]

## 2018-04-16 NOTE — ASSESSMENT & PLAN NOTE
51 yo woman with PMHx AFib on Eliquis (recently switch from Coumadin 4/3/18), prior cardiac arrest with associated stroke (residual mild L sided weakness), CAD with ICD in situ, HTN, and HLD presenting with acute R-sided weakness (including face) and sensation changes. SBP en route 200/100. No tPA 2/2 Eliquis use. Low suspicion for LVO. Admitted to  for observation overnight.      CTA H/N with no evidence of acute infarct. Concern for TIA, though migraine also possible cause of pt's presentation. Noncalcified plaquing of carotid bifurcations and proximal ICAs with < 50% stenosis; adding Plavix.   Staff concerned that pt had an event while on Eliquis; wanted to try Pradaxa but switched to Xarelto per pt's insurance coverage.     D/c'd home Clorthalidone due to hyponatremia; pt to [facility] in [facility] with CMP on Monday.     Pt discharged home 4/12/18 with outpatient PT, OT.      Antithrombotics for secondary stroke prevention: Antiplatelets: Clopidogrel: 75 mg daily  Anticoagulants: Rivaroxaban 20 mg daily  Statins for secondary stroke prevention and hyperlipidemia, if present:   Statins: Atorvastatin- 40 mg daily  Aggressive risk factor modification: HTN, HLD, Diet, Exercise, Obesity, A-Fib, CAD  Rehab efforts: Outpatient PT, OT  BP parameters: TIA: SBP < 160; Parameters to be tightened on an outpatient basis since discontinuation of Clorthalidone

## 2018-04-16 NOTE — ASSESSMENT & PLAN NOTE
Stroke risk factor  Was on Coumadin, switched to Eliquis on 4/3/18  Staff concerned that pt had an event while on Eliquis  Attempted switch to Pradaxa but elected for Xarelto 20mg qNightly instead per pt's insurance coverage

## 2018-04-16 NOTE — ASSESSMENT & PLAN NOTE
Stroke RF  LDL 95.2 today  Continue atorvastatin 40mg daily  Consider dose increase at outpatient VN follow-up

## 2018-04-16 NOTE — PROGRESS NOTES
Subjective:       Patient ID: Amna Chawla is a 52 y.o. female.    Chief Complaint: Follow-up    HPI  Ms. Chawla is a 51 yo F with afib on Eliquis (last dose this am), prior cardiac arrest with associated acute cerebral ischemic stroke with residual mild L sided weakness, CAD with ICD in situ, HTN, and HLD who presented with acute R sided weakness and sensation changes.  She originally called EMS for palpitations, but developed acute right sided facial droop and RUE weakness at 1:40pm 4/12, after EMS had arrived.  She denied changes to speech or vision.  SBP en route 200/100.  She was ineligible for tPA 2/2 Eliquis use.      Ms. Chawla was admitted for acute stroke workup. Pt with pacemaker so unable to obtain MRI Brain; CTA H/N demonstrated no evidence acute infarction, though did exhibit noncalcified plaquing of carotid bifurcations and proximal ICAs with < 50% stenosis, so Plavix was added to pt's daily home regimen. Concerned for TIA at this time though Migraine very high on differential due to pt's hx headaches, including presently this admission. Hypertensive urgency/emergency also considered due to pt's very elevated levels with EMS. Per chart review, Eliquis was a new medication since 4/3/18 (had switched from Coumadin), however staff Faraz concerned that pt had a potential event while on Eliquis. She wished to switch to Pradaxa as an alternative DOAC (as it has an option for reversal), however changed to Xarelto per pt's insurance coverage.     While admitted, pt given cocktail for HA which she has received previously at the infusion clinic - IV Magnesium, IM Toradol, 2mg IV Morphine, 500cc NS bolus (IV Benadryl not included this time as pt had received a dose earlier that day prior to contrast imaging.) HA improved somewhat and pt was encouraged to follow up with Dr. Cortez for continued management of botox injections, etc. At that time, pt also found with hyponatremia; d/c'd Clorthalidone and plan was  made for pt to follow [facility] in [facility] on Monday 4/16/18 for repeat CMP to evaluate Na level and BP check since discontinuing this medication.  Ms. Chawla was evaluated and treated by PT, OT, and SLP who recommend d/c with outpatient PT and OT. She was discharged home 4/12/18 with [facility] follow-up Monday and Vascular Neurology follow-up in 4-6 weeks.    Today: Complaining of headache similar to episodes prior consistent with migraines. Blood pressure well controlled today though noted to be really high on admission. Patient reports that she doesn't have a blood pressure cuff at home secondary to insurance not paying for this.     Has been ambulating well since discharge. Lives with 10 year old daughter and her dad. She is accompanied by additional grown daughter who visits daily. Overall had been feeling well since discharge except for headache which developed today.     Unable to drive due to tremors and cognitive impairment s/p ischemic stroke. Tremors managed by Dr. Paredes. Sees Dr. Cortez for chronic migraines. Sees Dr. Rock for post concussive syndrome. Follows with Dr. Mejia in EP for ICD and rhythm management. Does not manage own finances- Silverio Dey (daughter) manages them for her. Still cooks and cleans at home. Wishes she could go to work but unable due to post stroke debilities.     Previously cared for by Dr. Jamil Child at [facility] but wishes to establish care here.     Review of Systems   Constitutional: Negative for chills and fever.   HENT: Negative for congestion.    Eyes: Negative for visual disturbance.   Respiratory: Negative for shortness of breath.    Cardiovascular: Negative for chest pain.   Gastrointestinal: Negative for constipation and diarrhea.   Genitourinary: Negative for dysuria.   Musculoskeletal: Negative for arthralgias.   Skin: Negative for rash.   Neurological: Positive for headaches. Negative for dizziness, tremors, seizures, syncope, facial asymmetry,  speech difficulty, light-headedness and numbness.   Psychiatric/Behavioral: Negative for confusion.       Objective:       Vitals:    04/16/18 1419   BP: 131/70   Pulse: 82     BMI 47.15  Physical Exam   Constitutional: She is oriented to person, place, and time. She appears well-developed and well-nourished. No distress.   HENT:   Head: Normocephalic and atraumatic.   Right Ear: External ear normal.   Left Ear: External ear normal.   Mouth/Throat: Oropharynx is clear and moist.   Eyes: Conjunctivae and EOM are normal. Pupils are equal, round, and reactive to light.   Neck: Normal range of motion. Neck supple. No JVD present.   Cardiovascular: Normal rate, regular rhythm and intact distal pulses.    No murmur heard.  Pulmonary/Chest: Effort normal and breath sounds normal. No respiratory distress. She has no wheezes. She has no rales.   Abdominal: Soft. Bowel sounds are normal. There is no tenderness.   Musculoskeletal: She exhibits no edema or tenderness.   Neurological: She is alert and oriented to person, place, and time.   Skin: Skin is warm and dry. She is not diaphoretic.   Psychiatric: She has a normal mood and affect. Her behavior is normal. Judgment and thought content normal.   Nursing note and vitals reviewed.      Assessment:       1. Hyponatremia    2. Thyroid cyst    3. Chronic migraine without aura, with intractable migraine, so stated, with status migrainosus    4. Upper airway resistance syndrome    5. Long term (current) use of anticoagulants    6. Obesity, Class III, BMI 40-49.9 (morbid obesity)    7. Gastroesophageal reflux disease without esophagitis    8. Hospital discharge follow-up        Plan:   Hyponatremia  -     Basic metabolic panel; Future; Expected date: 04/16/2018  -     Discontinued chlorthalidone in clinic. BP and edema well controlled here    History of Ischemic Stroke  - 2/2 Vfib arrest and hypoplastic posterior circulation  - continue plavix started in hospital  - lipid panel wnl  with exception of low HDL on atorvastatin 40    Thyroid cyst  -     US Soft Tissue Head Neck Thyroid; Future; Expected date: 04/16/2018    Chronic migraine without aura, with intractable migraine, so stated, with status migrainosus  -     ondansetron (ZOFRAN-ODT) 4 MG TbDL; Take 1 tablet (4 mg total) by mouth every 24 hours as needed (nausea).  Dispense: 8 tablet; Refill: 0  -     F/u with Dr. Cortez as needed     Long term (current) use of anticoagulants  - recently switched from eliquis to xarelto given concerns of ischemic event while on eliquis    Obesity, Class III, BMI 40-49.9 (morbid obesity)  -     Ambulatory Referral to [facility] ([facility])    Gastroesophageal reflux disease without esophagitis  -     Case request GI: COLONOSCOPY    Hospital discharge follow-up  - started on plavix  - appt with Dr. Banda to address chronic medical problems and general screening      Kell Nix MD, PGY3  Pager [phone]  Staff recommendations to follow.

## 2018-04-16 NOTE — ASSESSMENT & PLAN NOTE
Stroke RF  BP meds held acutely  As no acute infarct on imaging, BP meds restarted with good response  Clorthalidone d/c'd in setting of hyponatremia  Plan for near follow-up with [facility] to re-evaluate BP regimen at that time

## 2018-04-18 ENCOUNTER — TELEPHONE (OUTPATIENT)
Dept: NEUROLOGY | Facility: CLINIC | Age: 53
End: 2018-04-18

## 2018-04-18 RX ORDER — CLOPIDOGREL BISULFATE 75 MG/1
TABLET ORAL
Qty: 90 TABLET | Refills: 1 | Status: SHIPPED | OUTPATIENT
Start: 2018-04-18 | End: 2018-06-04 | Stop reason: SDUPTHER

## 2018-04-18 NOTE — TELEPHONE ENCOUNTER
----- Message from Acacia Dukes sent at 4/18/2018  1:17 PM CDT -----  Contact: pt @ [phone]  Calling to ask that Dr. Ruth please send over the order for her to have Occupational and Physical therapy. Please call.

## 2018-04-19 ENCOUNTER — TELEPHONE (OUTPATIENT)
Dept: ENDOSCOPY | Facility: HOSPITAL | Age: 53
End: 2018-04-19

## 2018-04-19 NOTE — TELEPHONE ENCOUNTER
Pt called to schedule screening colonoscopy, Pt stated that she recently had a stroke. Per Pt, Pt with f/u nuerology visits and  to start rehab due to stroke. Pt to call back after cleared by neurology to schedule colonoscopy, number provided [phone].

## 2018-04-20 ENCOUNTER — HOSPITAL ENCOUNTER (OUTPATIENT)
Dept: RADIOLOGY | Facility: HOSPITAL | Age: 53
Discharge: HOME | End: 2018-04-20
Attending: INTERNAL MEDICINE
Payer: MEDICARE

## 2018-04-20 DIAGNOSIS — E04.1 THYROID CYST: Chronic | ICD-10-CM

## 2018-04-20 PROCEDURE — 76536 US EXAM OF HEAD AND NECK: CPT | Mod: TC

## 2018-04-20 PROCEDURE — 76536 US EXAM OF HEAD AND NECK: CPT | Mod: 26,,, | Performed by: RADIOLOGY

## 2018-04-23 ENCOUNTER — PATIENT MESSAGE (OUTPATIENT)
Dept: INTERNAL MEDICINE | Facility: CLINIC | Age: 53
End: 2018-04-23

## 2018-04-23 DIAGNOSIS — E04.1 THYROID CYST: Primary | Chronic | ICD-10-CM

## 2018-04-23 NOTE — TELEPHONE ENCOUNTER
Called and informed patient and daughter regarding results of thyroid US. Discussed that patient would need FNA biopsy to rule out malignant etiology. We discussed that she would likely need to hold plavix for 5 days and xarelto for 1-2 days prior to procedure. Patient's daughter expressed concern given her mother's recent neurological issues and would like to discuss with her vascular neurologist, Dr. Ruth, and EP doctor, first Kirby. Discussed that we would place the order for FNA biopsy and she could schedule when they had discussed with all of their physicians.

## 2018-04-25 ENCOUNTER — TELEPHONE (OUTPATIENT)
Dept: NEUROLOGY | Facility: CLINIC | Age: 53
End: 2018-04-25

## 2018-04-25 NOTE — TELEPHONE ENCOUNTER
----- Message from Acacia Dukes sent at 4/25/2018  8:10 AM CDT -----  Contact: pt @ [phone]  Calling to speak with someone regarding the session that Dr. Paredes is speaking at 7pm today, needing to know the location. Please call.

## 2018-04-25 NOTE — TELEPHONE ENCOUNTER
Pt provided with [facility] location info provided.

## 2018-04-27 ENCOUNTER — TELEPHONE (OUTPATIENT)
Dept: INTERNAL MEDICINE | Facility: CLINIC | Age: 53
End: 2018-04-27

## 2018-04-27 ENCOUNTER — TELEPHONE (OUTPATIENT)
Dept: ENDOCRINOLOGY | Facility: CLINIC | Age: 53
End: 2018-04-27

## 2018-04-27 NOTE — TELEPHONE ENCOUNTER
Received request for a new patient appointment with an Endocrine provider from Eleonora Cross with Dr. Kraus's office.  Appointment scheduled for 5/3/18 at 3 pm.

## 2018-04-27 NOTE — TELEPHONE ENCOUNTER
Patient needs an appointment with an Endocrinologist for Thyroid problems.  Scdeduled for 5/3/18 at 3 pm.  Eleonora Cross made aware.

## 2018-04-27 NOTE — TELEPHONE ENCOUNTER
----- Message from José Valladares sent at 4/25/2018  3:08 PM CDT -----  Contact: self/[phone]  Pt is calling to speak with Dr.Amy Nix about the surgery that is supposed to be done. She would like it to be done before May 20. Please advise.      Thanks

## 2018-04-30 ENCOUNTER — CLINICAL SUPPORT (OUTPATIENT)
Dept: ELECTROPHYSIOLOGY | Facility: CLINIC | Age: 53
End: 2018-04-30
Attending: INTERNAL MEDICINE
Payer: MEDICARE

## 2018-04-30 DIAGNOSIS — I44.2 COMPLETE AV BLOCK: ICD-10-CM

## 2018-04-30 DIAGNOSIS — I45.81 PROLONGED QT SYNDROME: ICD-10-CM

## 2018-04-30 DIAGNOSIS — G45.9 TIA (TRANSIENT ISCHEMIC ATTACK): ICD-10-CM

## 2018-04-30 DIAGNOSIS — Z86.74 HISTORY OF SUDDEN CARDIAC ARREST: ICD-10-CM

## 2018-04-30 DIAGNOSIS — Z95.810 AICD (AUTOMATIC CARDIOVERTER/DEFIBRILLATOR) PRESENT: Primary | ICD-10-CM

## 2018-04-30 DIAGNOSIS — Z95.810 AICD (AUTOMATIC CARDIOVERTER/DEFIBRILLATOR) PRESENT: ICD-10-CM

## 2018-04-30 PROCEDURE — 93296 REM INTERROG EVL PM/IDS: CPT | Mod: PBBFAC | Performed by: INTERNAL MEDICINE

## 2018-04-30 PROCEDURE — 99490 CHRNC CARE MGMT STAFF 1ST 20: CPT | Mod: PBBFAC | Performed by: PSYCHIATRY & NEUROLOGY

## 2018-04-30 PROCEDURE — 93295 DEV INTERROG REMOTE 1/2/MLT: CPT | Mod: ,,, | Performed by: INTERNAL MEDICINE

## 2018-04-30 PROCEDURE — 99490 CHRNC CARE MGMT STAFF 1ST 20: CPT | Mod: S$PBB,,, | Performed by: PSYCHIATRY & NEUROLOGY

## 2018-05-01 ENCOUNTER — EXTERNAL CHRONIC CARE MANAGEMENT (OUTPATIENT)
Dept: PRIMARY CARE CLINIC | Facility: CLINIC | Age: 53
End: 2018-05-01
Payer: MEDICARE

## 2018-05-01 DIAGNOSIS — G43.711 CHRONIC MIGRAINE WITHOUT AURA, WITH INTRACTABLE MIGRAINE, SO STATED, WITH STATUS MIGRAINOSUS: Primary | ICD-10-CM

## 2018-05-03 ENCOUNTER — OFFICE VISIT (OUTPATIENT)
Dept: ENDOCRINOLOGY | Facility: CLINIC | Age: 53
End: 2018-05-03
Payer: MEDICARE

## 2018-05-03 VITALS
BODY MASS INDEX: 46.9 KG/M2 | HEART RATE: 72 BPM | WEIGHT: 274.69 LBS | SYSTOLIC BLOOD PRESSURE: 110 MMHG | HEIGHT: 64 IN | DIASTOLIC BLOOD PRESSURE: 64 MMHG

## 2018-05-03 DIAGNOSIS — E04.2 MULTIPLE THYROID NODULES: Primary | ICD-10-CM

## 2018-05-03 PROCEDURE — 99214 OFFICE O/P EST MOD 30 MIN: CPT | Mod: PBBFAC | Performed by: INTERNAL MEDICINE

## 2018-05-03 PROCEDURE — 99999 PR PBB SHADOW E&M-EST. PATIENT-LVL IV: CPT | Mod: PBBFAC,,, | Performed by: INTERNAL MEDICINE

## 2018-05-03 PROCEDURE — 99203 OFFICE O/P NEW LOW 30 MIN: CPT | Mod: S$PBB,,, | Performed by: INTERNAL MEDICINE

## 2018-05-03 NOTE — LETTER
May 3, 2018      Kell Nix MD  [address]  [facility] [address]           [facility]  [address]  [facility] [address]  Phone: [phone]  Fax: [phone]          Patient: Amna Chawla   MR Number: [id number]   YOB: 1965   Date of Visit: 5/3/2018       Dear Dr. Kell Nix:    Thank you for referring Amna Chawla to me for evaluation. Attached you will find relevant portions of my assessment and plan of care.    If you have questions, please do not hesitate to call me. I look forward to following Amna Chawla along with you.    Sincerely,    Martin Quijano MD    Enclosure  CC:  No Recipients    If you would like to receive this communication electronically, please contact [email or web address] or [phone] to request more information on [employer] Link access.    For providers and/or their staff who would like to refer a patient to [facility], please contact us through our one-stop-shop provider referral line, [facility], at [phone].    If you feel you have received this communication in error or would no longer like to receive these types of communications, please e-mail [email or web address]

## 2018-05-03 NOTE — PROGRESS NOTES
"Ms. Chawla is a 52 y.o. F with history of cardiac arrest with associated acute cerebral ischemic stroke, CAD, w ICD, HTN, seizures, pacemaker, stroke, here for followup.      April 2018 CT neck for stroke rule out showed "Heterogeneous thyroid gland with punctate calcifications on the left and hypodense lesion on the right measuring 1.2 cm overall indeterminate."  Pt may have had TIA.     Subsequently had April 2018 US neck which showed (personally reviewed):   Right lobe:  -solid hypoechoic nodule measures 1.4 x 1.2 x 0.9 cm. TI-RADS level 5.  Nodule meets criteria for FNA biopsy.  Remaining right lobe nodules do not meet criteria for routine follow-up or FNA biopsy.  -solid hypoechoic nodule measures 0.6 x 0.9 x 0.4 cm.  TI-RADS level 4.  -anechoic cystic nodule measures 0.3 x 0.2 x 0.2 cm. TI-RADS level 1.  -solid isoechoic nodule measures 0.6 x 0.4 x 0.6 cm. TI-RADS level 3.  Left lobe:  -solid hypoechoic irregular nodule with microcalcification measures 2.0 x 1.8 x 1.2 cm. TI-RADS level 5.  Nodule meets criteria for FNA biopsy.  -solid hypoechoic nodule measures 1.4 x 1.0 x 0.8 cm.  Nodule meets criteria for routine follow-up in 1 year.  -cystic anechoic nodule measures 0.6 x 0.6 x 0.4 cm.  Nodule does not meet criteria for routine follow-up or FNA biopsy.      Component      Latest Ref Rng & Units 4/11/2018   TSH      0.400 - 4.000 uIU/mL 0.745     No family hx of thyroid ca, but niece has hyperthyroidism.  No hx radiation to the neck. No family hx of colon ca, niece and grandmother with breast cancer.     Pt is on plavix and xarelto just started after possible recent TIA.    Occasional feeling of inability to swallow all of saliva. No SOB or dysphagia.        Assessment and Plan:  Ms. Chawla with history of cardiac arrest with associated acute cerebral ischemic stroke, CAD, w ICD, HTN, seizures, pacemaker, stroke, here for followup.    Thyroid nodules: R and L meeting criteria  As pt on blood thinner, will " opt for 1 FNA at a time - would preferentially biopsy L first  However pt has yet to see vascular neurology (1st visit on 5/28) and they are the ones who started the current anticoagulation regimen for stroke, thus will have to get their opinion on if one or both AC can be held for FNA  Pt to call me after vascular neurology visit to schedule FNA    Martin Quijano      Past Medical History:   Diagnosis Date    Anxiety     Asthma     Brain anoxic injury     Coronary artery disease     Defibrillator activation 2013    Depression     History of sudden cardiac arrest 2/2013    PEA arrest with subsequent long-QT    Hx of psychiatric care     Hypertension     Left atrial enlargement 4/11/2018    Pacemaker 2013    Psychiatric problem     Seizures     Sleep difficulties     Stroke     weakness lt side    Therapy         reports that she has never smoked. She has never used smokeless tobacco. She reports that she does not drink alcohol or use drugs.    Family History   Problem Relation Age of Onset    Hypertension Mother     COPD      Heart failure      Glaucoma Maternal Grandmother     Glaucoma Paternal Grandmother        Past Surgical History:   Procedure Laterality Date    breast reduction      BREAST SURGERY      CARDIAC DEFIBRILLATOR PLACEMENT      CARDIAC DEFIBRILLATOR PLACEMENT      CARPAL TUNNEL RELEASE Right     COSMETIC SURGERY      HERNIA REPAIR      HIATAL HERNIA REPAIR      INSERT / REPLACE / REMOVE PACEMAKER      TUBAL LIGATION         Patient's Medications   New Prescriptions    No medications on file   Previous Medications    ARIPIPRAZOLE (ABILIFY) 5 MG TAB    Take 1 tablet (5 mg total) by mouth every evening.    ATORVASTATIN (LIPITOR) 40 MG TABLET    Take 1 tablet (40 mg total) by mouth every morning.    CARVEDILOL (COREG) 25 MG TABLET    TAKE 1 TABLET(25 MG) BY MOUTH TWICE DAILY WITH MEALS    CLONAZEPAM (KLONOPIN) 1 MG TABLET    Take 1 tablet (1 mg total) by mouth daily as needed  "for Anxiety.    CLOPIDOGREL (PLAVIX) 75 MG TABLET    TAKE 1 TABLET BY MOUTH ONCE DAILY    DONEPEZIL (ARICEPT) 10 MG TABLET    Take 1 tablet (10 mg total) by mouth 2 (two) times daily.    DULOXETINE (CYMBALTA) 60 MG CAPSULE    Take 1 capsule (60 mg total) by mouth 2 (two) times daily.    GABAPENTIN (NEURONTIN) 300 MG CAPSULE    Take 3 capsules (900 mg total) by mouth 3 (three) times daily.    HYDROXYZINE (ATARAX) 50 MG TABLET    Take 1 tablet (50 mg total) by mouth nightly as needed.    LORAZEPAM (ATIVAN) 0.5 MG TABLET    Take 1 tablet (0.5 mg total) by mouth every 6 (six) hours as needed for Anxiety.    MAGNESIUM 200 MG TAB    Take 200 mg by mouth once daily.    ONDANSETRON (ZOFRAN-ODT) 4 MG TBDL    Take 1 tablet (4 mg total) by mouth every 24 hours as needed (nausea).    PANTOPRAZOLE (PROTONIX) 40 MG TABLET    Take 1 tablet (40 mg total) by mouth once daily.    RIVAROXABAN (XARELTO) 20 MG TAB    Take 1 tablet (20 mg total) by mouth daily with dinner or evening meal.    TIAGABINE (GABITRIL) 4 MG TABLET    Take 1 tablet (4 mg total) by mouth every evening.    TOPIRAMATE (TOPAMAX) 100 MG TABLET    Take 2 tablets (200 mg total) by mouth 2 (two) times daily.    TRIAMCINOLONE ACETONIDE 0.1% (KENALOG) 0.1 % CREAM    AAA bid to rash up to 2 weeks    VALSARTAN (DIOVAN) 80 MG TABLET    Take 1 tablet (80 mg total) by mouth once daily.    ZOLPIDEM (AMBIEN) 10 MG TAB    Take 1 tablet (10 mg total) by mouth nightly as needed.   Modified Medications    No medications on file   Discontinued Medications    No medications on file         Review of Systems:  General: no fevers  Eyes: no vision changes  ENT: no sore throat  Lung: no sob  CV: no palpitations  GI: no nausea   : no dysuria   Endo: no heat intolerance  Heme: no easy bruising   MSK: no joint swelling      /64   Pulse 72   Ht 5' 4" (1.626 m)   Wt 124.6 kg (274 lb 11.1 oz)   BMI 47.15 kg/m²     Physical Exam:  General: obese body habitus, not in acute distress "   Eyes: anicteric, no proptosis   ENT: no facial lesions, no oral lesions   Neck: thyroid 18g, difficult to palpate discrete nodules, no LAD   Lung; ctabl, no wheezing or stridor   Psych: normal affect, appropriate in conversation  CV: RRR, no rubs or murmurs   Skin: exposed skin without bruising or bleeding   Ext: no peripheral edema or erythema  Neuro: AOx3, moving all extremities, normal gait     Labs:    Chemistry        Component Value Date/Time     04/20/2018 1327    K 3.9 04/20/2018 1327     04/20/2018 1327    CO2 24 04/20/2018 1327    BUN 9 04/20/2018 1327    CREATININE 0.7 04/20/2018 1327     (H) 04/20/2018 1327        Component Value Date/Time    CALCIUM 9.1 04/20/2018 1327    ALKPHOS 66 04/12/2018 0510    AST 18 04/12/2018 0510    ALT 29 04/12/2018 0510    BILITOT 0.3 04/12/2018 0510    ESTGFRAFRICA >60.0 04/20/2018 1327    EGFRNONAA >60.0 04/20/2018 1327          Lab Results   Component Value Date    WBC 6.08 04/12/2018    HGB 12.1 04/12/2018    HCT 36.1 (L) 04/12/2018    MCV 89 04/12/2018     04/12/2018        Lab Results   Component Value Date    HDL 38 (L) 04/11/2018    HDL 37 (L) 02/14/2018    HDL 43 03/20/2017     Lab Results   Component Value Date    LDLCALC 95.2 04/11/2018    LDLCALC 93.0 02/14/2018    LDLCALC 163.2 (H) 03/20/2017     Lab Results   Component Value Date    TRIG 129 04/11/2018    TRIG 75 02/14/2018    TRIG 74 03/20/2017     Lab Results   Component Value Date    CHOLHDL 23.9 04/11/2018    CHOLHDL 25.5 02/14/2018    CHOLHDL 19.5 (L) 03/20/2017       Hemoglobin A1C   Date Value Ref Range Status   04/11/2018 5.6 4.0 - 5.6 % Final     Comment:     According to ADA guidelines, hemoglobin A1c <7.0% represents  optimal control in non-pregnant diabetic patients. Different  metrics may apply to specific patient populations.   Standards of Medical Care in Diabetes-2016.  For the purpose of screening for the presence of diabetes:  <5.7%     Consistent with the absence  of diabetes  5.7-6.4%  Consistent with increasing risk for diabetes   (prediabetes)  >or=6.5%  Consistent with diabetes  Currently, no consensus exists for use of hemoglobin A1c  for diagnosis of diabetes for children.  This Hemoglobin A1c assay has significant interference with fetal   hemoglobin   (HbF). The results are invalid for patients with abnormal amounts of   HbF,   including those with known Hereditary Persistence   of Fetal Hemoglobin. Heterozygous hemoglobin variants (HbAS, HbAC,   HbAD, HbAE, HbA2) do not significantly interfere with this assay;   however, presence of multiple variants in a sample may impact the %   interference.     02/02/2014 6.0 4.5 - 6.2 % Final   02/08/2013 5.9 4.0 - 6.2 % Final       Lab Results   Component Value Date    TSH 0.745 04/11/2018    E4WCITL 141.42 02/07/2013    R8XJXHT 8.4 02/07/2013

## 2018-05-08 ENCOUNTER — OFFICE VISIT (OUTPATIENT)
Dept: INTERNAL MEDICINE | Facility: CLINIC | Age: 53
End: 2018-05-08
Payer: MEDICARE

## 2018-05-08 VITALS
BODY MASS INDEX: 47.31 KG/M2 | OXYGEN SATURATION: 98 % | SYSTOLIC BLOOD PRESSURE: 138 MMHG | DIASTOLIC BLOOD PRESSURE: 81 MMHG | WEIGHT: 277.13 LBS | HEIGHT: 64 IN | HEART RATE: 80 BPM

## 2018-05-08 DIAGNOSIS — J06.9 UPPER RESPIRATORY TRACT INFECTION, UNSPECIFIED TYPE: Primary | ICD-10-CM

## 2018-05-08 PROCEDURE — 99214 OFFICE O/P EST MOD 30 MIN: CPT | Mod: S$PBB,GC,,

## 2018-05-08 PROCEDURE — 99999 PR PBB SHADOW E&M-EST. PATIENT-LVL V: CPT | Mod: PBBFAC,GC,,

## 2018-05-08 PROCEDURE — 99215 OFFICE O/P EST HI 40 MIN: CPT | Mod: PBBFAC

## 2018-05-08 RX ORDER — ZOLPIDEM TARTRATE 12.5 MG/1
TABLET, FILM COATED, EXTENDED RELEASE ORAL
Refills: 5 | COMMUNITY
Start: 2018-05-03 | End: 2018-05-08 | Stop reason: SDUPTHER

## 2018-05-08 NOTE — PATIENT INSTRUCTIONS
Take half of your Atarax during the day for the next few days to help with your symptoms.    Continue taking the Tylenol.    Try plain Mucinex (generic name is guaifenesin) for your cough.

## 2018-05-08 NOTE — PROGRESS NOTES
"Clinic Note  05/08/2018       Subjective:       Patient ID: Amna Chawla is a 52 y.o. female being seen for an established visit.    Chief Complaint: URI    HPI    Mrs. Chawla is a 53 yo W with extensive PMHx (more detail below) here in clinic for the CC of URI symptoms x 5 days. Patient states that she has had cough, productive of small amount of greenish sputum, as well as subjective fevers, nasal congestion, rhinorrhea and sore throat. Also reports significant fatigue. Patient states her roommate was also recently sick with similar symptoms. Taking Tylenol PRN with improvement of symptoms.      History of V fib arrest, complete heart block, s/p ICD  History of v fib arrest in February 2013. Underwent hypothermia protocol. Post-arrest was found to have intermittent 3rd-degree AV block. On 2/15/2013, a dual chamber ICD was implanted. EF prior to discharge was 60%, negative coronary CTA during that admission. Pharmacologic stress ECHO in 2014 with preserved LVEF and no ischemia. Follows with Dr. Mejia.    Anoxic brain injury/ seizure disorder  Occurred as a result of V fib arrest and hypoplastic posterior circulation. Has mild-moderate persistent cognitive deficits - on donepezil. Follows with Dr. Rock in Neurology. On Topiramate for resulting seizure disorder, lorazepam PRN for "tremor."    History of CVA  Patient with multiple CVA/TIA in history. Most recent 3 weeks ago. No focal neuro deficits today. On plavix, statin, xarelto. Allergic to ASA.    Headaches due to previous head injury  Patient with headaches associated with neck pain, photophobia, phonophobia. Requires botox injections with Dr. Cortez. Has required infusion therapy in the past. On magnesium supplementation, Gabitril. No headaches today.    Depression/anxiety  Follows with psychiatry. On Cymbalta, clonazepam. Reports that she takes the clonazepam to calm down for social situations.    HTN  On valsartan, coreg. /81 in clinic " today.    History of CVA  Has upcoming appointment with Vascular Neurology.    Insomnia  Managed per Psych, ambien and atarax.    GERD  Pantoprazole daily. No current symptoms.     Thyroid nodule  During work up for recent TIA, CT head and neck performed which was remarkable for concerning thyroid nodule. Following with Dr. Quijano. Planning for FNA after Vascular Neurology follow up with anticoagulation can be addresssed.    Past Medical History:   Diagnosis Date    Anxiety     Asthma     Brain anoxic injury     Coronary artery disease     Defibrillator activation 2013    Depression     History of sudden cardiac arrest 2/2013    PEA arrest with subsequent long-QT    Hx of psychiatric care     Hypertension     Left atrial enlargement 4/11/2018    Pacemaker 2013    Psychiatric problem     Seizures     Sleep difficulties     Stroke     weakness lt side    Therapy        Past Surgical History:   Procedure Laterality Date    breast reduction      BREAST SURGERY      CARDIAC DEFIBRILLATOR PLACEMENT      CARDIAC DEFIBRILLATOR PLACEMENT      CARPAL TUNNEL RELEASE Right     COSMETIC SURGERY      HERNIA REPAIR      HIATAL HERNIA REPAIR      INSERT / REPLACE / REMOVE PACEMAKER      TUBAL LIGATION         Family History   Problem Relation Age of Onset    Hypertension Mother     COPD Unknown     Heart failure Unknown     Glaucoma Maternal Grandmother     Glaucoma Paternal Grandmother        Social History     Social History    Marital status: Single     Spouse name: N/A    Number of children: N/A    Years of education: N/A     Occupational History     [employer] [employer]     Social History Main Topics    Smoking status: Never Smoker    Smokeless tobacco: Never Used    Alcohol use No    Drug use: No    Sexual activity: Not Currently     Other Topics Concern    None     Social History Narrative    None       Review of Systems   Constitutional: Positive for chills, fatigue and fever.   HENT:  Positive for congestion, rhinorrhea, sinus pain, sinus pressure, sneezing and sore throat.    Eyes: Negative for visual disturbance.   Respiratory: Positive for cough. Negative for shortness of breath.    Cardiovascular: Negative for chest pain.   Gastrointestinal: Negative for constipation and diarrhea.   Genitourinary: Negative for dysuria.   Musculoskeletal: Negative for arthralgias.   Skin: Negative for rash.   Neurological: Positive for headaches. Negative for dizziness, tremors, seizures, syncope, facial asymmetry, speech difficulty, light-headedness and numbness.   Psychiatric/Behavioral: Negative for confusion.       Patient's Medications   New Prescriptions    No medications on file   Previous Medications    ARIPIPRAZOLE (ABILIFY) 5 MG TAB    Take 1 tablet (5 mg total) by mouth every evening.    ATORVASTATIN (LIPITOR) 40 MG TABLET    Take 1 tablet (40 mg total) by mouth every morning.    CARVEDILOL (COREG) 25 MG TABLET    TAKE 1 TABLET(25 MG) BY MOUTH TWICE DAILY WITH MEALS    CLONAZEPAM (KLONOPIN) 1 MG TABLET    Take 1 tablet (1 mg total) by mouth daily as needed for Anxiety.    CLOPIDOGREL (PLAVIX) 75 MG TABLET    TAKE 1 TABLET BY MOUTH ONCE DAILY    DONEPEZIL (ARICEPT) 10 MG TABLET    Take 1 tablet (10 mg total) by mouth 2 (two) times daily.    DULOXETINE (CYMBALTA) 60 MG CAPSULE    Take 1 capsule (60 mg total) by mouth 2 (two) times daily.    GABAPENTIN (NEURONTIN) 300 MG CAPSULE    Take 3 capsules (900 mg total) by mouth 3 (three) times daily.    HYDROXYZINE (ATARAX) 50 MG TABLET    Take 1 tablet (50 mg total) by mouth nightly as needed.    LORAZEPAM (ATIVAN) 0.5 MG TABLET    Take 1 tablet (0.5 mg total) by mouth every 6 (six) hours as needed for Anxiety.    MAGNESIUM 200 MG TAB    Take 200 mg by mouth once daily.    ONDANSETRON (ZOFRAN-ODT) 4 MG TBDL    Take 1 tablet (4 mg total) by mouth every 24 hours as needed (nausea).    PANTOPRAZOLE (PROTONIX) 40 MG TABLET    Take 1 tablet (40 mg total) by mouth  once daily.    RIVAROXABAN (XARELTO) 20 MG TAB    Take 1 tablet (20 mg total) by mouth daily with dinner or evening meal.    TIAGABINE (GABITRIL) 4 MG TABLET    Take 1 tablet (4 mg total) by mouth every evening.    TOPIRAMATE (TOPAMAX) 100 MG TABLET    Take 2 tablets (200 mg total) by mouth 2 (two) times daily.    TRIAMCINOLONE ACETONIDE 0.1% (KENALOG) 0.1 % CREAM    AAA bid to rash up to 2 weeks    VALSARTAN (DIOVAN) 80 MG TABLET    Take 1 tablet (80 mg total) by mouth once daily.    ZOLPIDEM (AMBIEN) 10 MG TAB    Take 1 tablet (10 mg total) by mouth nightly as needed.   Modified Medications    No medications on file   Discontinued Medications    ZOLPIDEM (AMBIEN CR) 12.5 MG CR TABLET    TK 1 T PO NIGHTLY PRF INSOMNIA       Patient Active Problem List    Diagnosis Date Noted    Upper respiratory tract infection 05/08/2018    Thyroid cyst 04/16/2018    TIA (transient ischemic attack) 04/12/2018    Hyponatremia 04/12/2018    Left atrial enlargement 04/11/2018    Depression, major, recurrent, moderate 04/03/2018    Insomnia 04/03/2018    Frequent falls 03/07/2018    History of DVT (deep vein thrombosis) 02/21/2018    Nonischemic cardiomyopathy from RV pacing 09/27/2017    Dyspnea on exertion 09/12/2017    Unspecified sleep apnea     Bilateral shoulder pain 04/12/2017    Decreased ROM of left shoulder 04/12/2017    Impaired functional mobility, balance, gait, and endurance 04/12/2017    Long term (current) use of anticoagulants 03/10/2017    Paroxysmal atrial fibrillation 03/09/2017    Upper airway resistance syndrome 02/21/2017    Left carpal tunnel syndrome 02/03/2017    Headaches due to old head injury 01/23/2017    Decreased ROM of neck 01/23/2017    Impaired mobility and ADLs 01/16/2017    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 01/16/2017    Right carpal tunnel syndrome 12/09/2016    Right ankle pain 09/16/2016    Palpitations 09/15/2016    Keratoconjunctivitis sicca  "of both eyes not specified as Sjogren's 06/16/2016    Anxiety 03/21/2016    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    Neck muscle spasm 09/09/2015    Convulsion 05/30/2015    Essential hypertension 05/30/2015    Left-sided headache 05/29/2015    Transient alteration of awareness     History of sudden cardiac arrest 12/10/2014    Biventricular automatic implantable cardioverter defibrillator in situ 12/10/2014    Obesity, Class III, BMI 40-49.9 (morbid obesity) 09/17/2014    Cervicalgia 08/28/2014    Occipital neuralgia 08/28/2014    Chronic migraine without aura, with intractable migraine, so stated, with status migrainosus 08/28/2014    Special screening for malignant neoplasms, colon 06/24/2014    Atypical chest pain 02/13/2014    Hiatal hernia 02/13/2014    GERD (gastroesophageal reflux disease) 02/13/2014    Knee pain 01/15/2014    History of CVA (cerebrovascular accident) 12/03/2013    Paresthesia 11/01/2013    Pacemaker 09/05/2013    HLD (hyperlipidemia) 09/05/2013    CHI (closed head injury) 02/19/2013    Complete AV block 02/19/2013    Cognitive deficits 02/19/2013    Anoxia 02/08/2013    Pneumonia 02/08/2013    Prolonged Q-T interval on ECG 02/08/2013           Objective:      /81 (BP Location: Right arm, Patient Position: Sitting)   Pulse 80   Ht 5' 4" (1.626 m)   Wt 125.7 kg (277 lb 1.9 oz)   SpO2 98%   BMI 47.57 kg/m²   Estimated body mass index is 47.57 kg/m² as calculated from the following:    Height as of this encounter: 5' 4" (1.626 m).    Weight as of this encounter: 125.7 kg (277 lb 1.9 oz).    Physical Exam   Constitutional: She is oriented to person, place, and time. She appears well-developed and well-nourished. No distress.   HENT:   Head: Normocephalic and atraumatic.   Eyes: Conjunctivae are normal. No scleral icterus.   Neck: Normal range of motion.   Cardiovascular: Normal rate, regular rhythm, normal heart sounds and intact distal pulses.  "   Pulmonary/Chest: Effort normal and breath sounds normal.   Abdominal: Soft. She exhibits no distension. There is no tenderness.   Musculoskeletal: Normal range of motion. She exhibits no edema.   Neurological: She is alert and oriented to person, place, and time.   Skin: Skin is warm and dry.   Psychiatric: She has a normal mood and affect. Her behavior is normal.       Assessment:         1. Upper respiratory tract infection, unspecified type           Plan:         1. Upper respiratory tract infection, unspecified type  - Advised patient to continue Tylenol PRN, and to take half of her atarax dose during the day for symptom improvement  - Hesitant for further medications given patient's extensive regimen and concern for drug interactions  - Advised to call clinic if no improvement by next week      No orders of the defined types were placed in this encounter.            Patient seen and plan of care discussed with Dr. Garcia.    Sam Allred MD  Internal Medicine, PGY-1

## 2018-05-11 NOTE — PROGRESS NOTES
Preceptor note    Patient's complex medical history and physical discussed, please refer to resident physician's note for specific details. Pt seen with resident physician. Medical record reviewed. I agree with resident's assessment and plan.

## 2018-05-15 ENCOUNTER — LAB VISIT (OUTPATIENT)
Dept: LAB | Facility: HOSPITAL | Age: 53
End: 2018-05-15
Attending: INTERNAL MEDICINE
Payer: MEDICARE

## 2018-05-15 ENCOUNTER — TELEPHONE (OUTPATIENT)
Dept: INTERNAL MEDICINE | Facility: CLINIC | Age: 53
End: 2018-05-15

## 2018-05-15 DIAGNOSIS — J06.9 UPPER RESPIRATORY TRACT INFECTION, UNSPECIFIED TYPE: Primary | ICD-10-CM

## 2018-05-15 DIAGNOSIS — Z79.01 LONG TERM (CURRENT) USE OF ANTICOAGULANTS: ICD-10-CM

## 2018-05-15 LAB
ERYTHROCYTE [DISTWIDTH] IN BLOOD BY AUTOMATED COUNT: 15.3 %
HCT VFR BLD AUTO: 32.2 %
HGB BLD-MCNC: 10.9 G/DL
MCH RBC QN AUTO: 30.3 PG
MCHC RBC AUTO-ENTMCNC: 33.9 G/DL
MCV RBC AUTO: 89 FL
PLATELET # BLD AUTO: 224 K/UL
PMV BLD AUTO: 11.2 FL
RBC # BLD AUTO: 3.6 M/UL
WBC # BLD AUTO: 3.27 K/UL

## 2018-05-15 PROCEDURE — 36415 COLL VENOUS BLD VENIPUNCTURE: CPT

## 2018-05-15 PROCEDURE — 85027 COMPLETE CBC AUTOMATED: CPT

## 2018-05-15 RX ORDER — GUAIFENESIN/DEXTROMETHORPHAN 100-10MG/5
5 SYRUP ORAL
Qty: 236 ML | Refills: 0 | Status: SHIPPED | OUTPATIENT
Start: 2018-05-15 | End: 2018-05-25

## 2018-05-15 RX ORDER — DOXYCYCLINE 100 MG/1
100 CAPSULE ORAL 2 TIMES DAILY
Qty: 14 CAPSULE | Refills: 0 | Status: SHIPPED | OUTPATIENT
Start: 2018-05-15 | End: 2018-07-24

## 2018-05-15 NOTE — PROGRESS NOTES
Please notify the patient that her blood count is slightly lower than prior. Please ask if she has noted any blood in her stool when going to the bathroom. We should recheck it in 1 week if she hasn't. If she has then she needs a GI consultation.

## 2018-05-15 NOTE — TELEPHONE ENCOUNTER
Recieved call from patient that she is still experiencing sinus pain, pressure, cough and subjective fevers. Symptoms have been ongoing for approximately 10 days. As patient is pen allergic, sent prescription for doxycycline 100 mg PO BID x 7 days. Explained this to patient over the phone, and she expressed understanding.

## 2018-05-16 ENCOUNTER — PROCEDURE VISIT (OUTPATIENT)
Dept: NEUROLOGY | Facility: CLINIC | Age: 53
End: 2018-05-16
Payer: MEDICARE

## 2018-05-16 ENCOUNTER — TELEPHONE (OUTPATIENT)
Dept: ELECTROPHYSIOLOGY | Facility: CLINIC | Age: 53
End: 2018-05-16

## 2018-05-16 ENCOUNTER — TELEPHONE (OUTPATIENT)
Dept: NEUROLOGY | Facility: CLINIC | Age: 53
End: 2018-05-16

## 2018-05-16 VITALS
HEIGHT: 64 IN | SYSTOLIC BLOOD PRESSURE: 158 MMHG | BODY MASS INDEX: 47.68 KG/M2 | HEART RATE: 65 BPM | DIASTOLIC BLOOD PRESSURE: 86 MMHG | WEIGHT: 279.31 LBS

## 2018-05-16 DIAGNOSIS — D64.9 ANEMIA, UNSPECIFIED TYPE: Primary | ICD-10-CM

## 2018-05-16 DIAGNOSIS — G43.711 CHRONIC MIGRAINE WITHOUT AURA, WITH INTRACTABLE MIGRAINE, SO STATED, WITH STATUS MIGRAINOSUS: Primary | ICD-10-CM

## 2018-05-16 DIAGNOSIS — M62.838 NECK MUSCLE SPASM: ICD-10-CM

## 2018-05-16 PROCEDURE — 64615 CHEMODENERV MUSC MIGRAINE: CPT | Mod: PBBFAC | Performed by: PSYCHIATRY & NEUROLOGY

## 2018-05-16 PROCEDURE — 64615 CHEMODENERV MUSC MIGRAINE: CPT | Mod: S$PBB,,, | Performed by: PSYCHIATRY & NEUROLOGY

## 2018-05-16 RX ORDER — AZITHROMYCIN 250 MG/1
TABLET, FILM COATED ORAL
Refills: 0 | COMMUNITY
Start: 2018-05-10 | End: 2018-05-28

## 2018-05-16 RX ADMIN — ONABOTULINUMTOXINA 200 UNITS: 100 INJECTION, POWDER, LYOPHILIZED, FOR SOLUTION INTRADERMAL; INTRAMUSCULAR at 11:05

## 2018-05-16 NOTE — TELEPHONE ENCOUNTER
Spoke with patient and relayed results of CBC per Dr Mejia' note. She states she has no signs of obvious bleeding. Per Dr Mejia' recommendations, will repeat CBC on 5/23/2018.

## 2018-05-16 NOTE — TELEPHONE ENCOUNTER
----- Message from Avelino Mejia MD sent at 5/15/2018 10:52 AM CDT -----  Please notify the patient that her blood count is slightly lower than prior. Please ask if she has noted any blood in her stool when going to the bathroom. We should recheck it in 1 week if she hasn't. If she has then she needs a GI consultation.

## 2018-05-16 NOTE — TELEPHONE ENCOUNTER
Called pt and she mentioned that her tremors are worse and she need and apt. Pt is scheduled for July 9 at 4:20p.m

## 2018-05-16 NOTE — TELEPHONE ENCOUNTER
----- Message from Acacia Dukes sent at 5/16/2018  3:30 PM CDT -----  Contact: pt @ [phone]  Calling to speak with someone in Dr. Paredes's office regarding her having tremors and says that the medication is not working. Please call.

## 2018-05-17 ENCOUNTER — PATIENT MESSAGE (OUTPATIENT)
Dept: NEUROLOGY | Facility: CLINIC | Age: 53
End: 2018-05-17

## 2018-05-18 DIAGNOSIS — G43.711 CHRONIC MIGRAINE WITHOUT AURA, WITH INTRACTABLE MIGRAINE, SO STATED, WITH STATUS MIGRAINOSUS: Primary | ICD-10-CM

## 2018-05-18 RX ORDER — KETOROLAC TROMETHAMINE 30 MG/ML
60 INJECTION, SOLUTION INTRAMUSCULAR; INTRAVENOUS
Status: DISCONTINUED | OUTPATIENT
Start: 2018-05-18 | End: 2018-09-30 | Stop reason: HOSPADM

## 2018-05-23 ENCOUNTER — TELEPHONE (OUTPATIENT)
Dept: ELECTROPHYSIOLOGY | Facility: CLINIC | Age: 53
End: 2018-05-23

## 2018-05-23 ENCOUNTER — LAB VISIT (OUTPATIENT)
Dept: LAB | Facility: HOSPITAL | Age: 53
End: 2018-05-23
Attending: INTERNAL MEDICINE
Payer: MEDICARE

## 2018-05-23 DIAGNOSIS — D64.9 ANEMIA, UNSPECIFIED TYPE: ICD-10-CM

## 2018-05-23 LAB
BASOPHILS # BLD AUTO: 0 K/UL
BASOPHILS NFR BLD: 0 %
DIFFERENTIAL METHOD: ABNORMAL
EOSINOPHIL # BLD AUTO: 0 K/UL
EOSINOPHIL NFR BLD: 0 %
ERYTHROCYTE [DISTWIDTH] IN BLOOD BY AUTOMATED COUNT: 15.8 %
HCT VFR BLD AUTO: 34.2 %
HGB BLD-MCNC: 11.3 G/DL
IMM GRANULOCYTES # BLD AUTO: 0 K/UL
IMM GRANULOCYTES NFR BLD AUTO: 0 %
LYMPHOCYTES # BLD AUTO: 1.4 K/UL
LYMPHOCYTES NFR BLD: 47.6 %
MCH RBC QN AUTO: 29.6 PG
MCHC RBC AUTO-ENTMCNC: 33 G/DL
MCV RBC AUTO: 90 FL
MONOCYTES # BLD AUTO: 0.5 K/UL
MONOCYTES NFR BLD: 16.2 %
NEUTROPHILS # BLD AUTO: 1.1 K/UL
NEUTROPHILS NFR BLD: 36.2 %
NRBC BLD-RTO: 0 /100 WBC
PLATELET # BLD AUTO: 217 K/UL
PMV BLD AUTO: 11 FL
RBC # BLD AUTO: 3.82 M/UL
WBC # BLD AUTO: 2.96 K/UL

## 2018-05-23 PROCEDURE — 85025 COMPLETE CBC W/AUTO DIFF WBC: CPT

## 2018-05-23 PROCEDURE — 36415 COLL VENOUS BLD VENIPUNCTURE: CPT

## 2018-05-23 NOTE — TELEPHONE ENCOUNTER
----- Message from Avelino Mejia MD sent at 5/23/2018  1:58 PM CDT -----  Please notify the patient that her blood count is stable and slightly higher than last week.

## 2018-05-23 NOTE — TELEPHONE ENCOUNTER
Left message for patient advising of CBC results and comments, per Dr Mejia' note. Call back # left.

## 2018-05-28 ENCOUNTER — OFFICE VISIT (OUTPATIENT)
Dept: NEUROLOGY | Facility: CLINIC | Age: 53
End: 2018-05-28
Payer: MEDICARE

## 2018-05-28 DIAGNOSIS — G45.1 HEMISPHERIC CAROTID ARTERY SYNDROME: ICD-10-CM

## 2018-05-28 DIAGNOSIS — G43.711 CHRONIC MIGRAINE WITHOUT AURA, WITH INTRACTABLE MIGRAINE, SO STATED, WITH STATUS MIGRAINOSUS: ICD-10-CM

## 2018-05-28 PROCEDURE — 99999 PR PBB SHADOW E&M-EST. PATIENT-LVL I: CPT | Mod: PBBFAC,,, | Performed by: PSYCHIATRY & NEUROLOGY

## 2018-05-28 PROCEDURE — 99211 OFF/OP EST MAY X REQ PHY/QHP: CPT | Mod: PBBFAC,25 | Performed by: PSYCHIATRY & NEUROLOGY

## 2018-05-28 PROCEDURE — 96372 THER/PROPH/DIAG INJ SC/IM: CPT | Mod: PBBFAC

## 2018-05-28 PROCEDURE — 99214 OFFICE O/P EST MOD 30 MIN: CPT | Mod: S$PBB,,, | Performed by: PSYCHIATRY & NEUROLOGY

## 2018-05-28 RX ORDER — OXYCODONE AND ACETAMINOPHEN 5; 325 MG/1; MG/1
1 TABLET ORAL EVERY 4 HOURS PRN
Qty: 4 TABLET | Refills: 0 | Status: SHIPPED | OUTPATIENT
Start: 2018-05-28 | End: 2018-07-24

## 2018-05-28 RX ORDER — KETOROLAC TROMETHAMINE 30 MG/ML
60 INJECTION, SOLUTION INTRAMUSCULAR; INTRAVENOUS
Status: COMPLETED | OUTPATIENT
Start: 2018-05-28 | End: 2018-05-28

## 2018-05-28 RX ADMIN — KETOROLAC TROMETHAMINE 60 MG: 60 INJECTION, SOLUTION INTRAMUSCULAR at 11:05

## 2018-05-28 NOTE — PROGRESS NOTES
Subjective:       Patient ID: Amna Chawla is a 52 y.o. female.    Chief Complaint:  Transient Ischemic Attack      History of Present Illness  HPI  Stroke Follow-up  She presents for follow-up of TIA. Event occurred 2 months ago. She has residual symptoms of weakness of right face, right arm(s), right hand(s), right upper leg(s), right lower leg(s) or right foot(feet), right facial droop. She denies seizures, gait disturbance, balance disturbance, inability to speak, slurred speech, cognitive impairment, swallowing difficulty, loss of vision bilateral. Overall she feels the condition is improved. Stroke risk factors include atrial fibrillation, hyperlipidemia and hypertension. She also complains of none. She denies chest pain, palpitations, seizures and shortness of breath.       Review of Systems  Review of Systems   Neurological: Positive for facial asymmetry, weakness and headaches.        Episodic stumbling   All other systems reviewed and are negative.      Objective:      Neurologic Exam     Mental Status   Oriented to person, place, and time.   Registration: recalls 3 of 3 objects. Recall at 5 minutes: recalls 3 of 3 objects. Follows 3 step commands.   Attention: normal. Concentration: normal.   Speech: speech is normal   Level of consciousness: alert  Knowledge: good.   Able to name object. Able to read. Able to repeat. Able to write. Normal comprehension.     Cranial Nerves     CN II   Visual fields full to confrontation.     CN III, IV, VI   Pupils are equal, round, and reactive to light.  Extraocular motions are normal.     CN V   Facial sensation intact.     CN VII   Right facial weakness: central  Left facial weakness: none    CN VIII   CN VIII normal.     CN IX, X   CN IX normal.   CN X normal.     CN XI   CN XI normal.     CN XII   CN XII normal.     Motor Exam     Strength   Strength 5/5 throughout.     Sensory Exam   Placido-hypesthesia right side     Gait, Coordination, and Reflexes      Gait  Gait: normal    Coordination   Romberg: negative  Finger to nose coordination: normal  Heel to shin coordination: normal    Tremor   Resting tremor: absent  Intention tremor: absent  Action tremor: absent    Reflexes   Reflexes 2+ except as noted.       Physical Exam   Constitutional: She is oriented to person, place, and time. She appears well-developed and well-nourished.   HENT:   Head: Normocephalic and atraumatic.   Eyes: EOM are normal. Pupils are equal, round, and reactive to light.   Cardiovascular: Normal rate and regular rhythm.    Pulmonary/Chest: Effort normal.   Neurological: She is alert and oriented to person, place, and time. She has normal strength. A cranial nerve deficit is present. She has a normal Finger-Nose-Finger Test, a normal Heel to Acevedo Test and a normal Romberg Test. Gait normal.   Psychiatric: Her speech is normal.   Vitals reviewed.        Assessment:         Problem List Items Addressed This Visit     None          Plan:

## 2018-05-28 NOTE — ASSESSMENT & PLAN NOTE
Hemispheric carotid artery syndrome  Patient is doing better. Still with some residual  Motor deficits per patient. (+) sensory deficit.  Continue medications  RTC 3 months.

## 2018-05-29 ENCOUNTER — PATIENT MESSAGE (OUTPATIENT)
Dept: ENDOCRINOLOGY | Facility: CLINIC | Age: 53
End: 2018-05-29

## 2018-05-31 PROCEDURE — 99490 CHRNC CARE MGMT STAFF 1ST 20: CPT | Mod: S$PBB,,, | Performed by: PSYCHIATRY & NEUROLOGY

## 2018-05-31 PROCEDURE — 99490 CHRNC CARE MGMT STAFF 1ST 20: CPT | Mod: PBBFAC | Performed by: PSYCHIATRY & NEUROLOGY

## 2018-06-03 ENCOUNTER — PATIENT MESSAGE (OUTPATIENT)
Dept: NEUROLOGY | Facility: CLINIC | Age: 53
End: 2018-06-03

## 2018-06-04 ENCOUNTER — TELEPHONE (OUTPATIENT)
Dept: ENDOCRINOLOGY | Facility: CLINIC | Age: 53
End: 2018-06-04

## 2018-06-04 DIAGNOSIS — E04.2 MULTIPLE THYROID NODULES: Primary | ICD-10-CM

## 2018-06-04 NOTE — TELEPHONE ENCOUNTER
----- Message from Vane Campos sent at 6/4/2018  8:58 AM CDT -----  Contact: Pt  Pt says she seen the neurologist and was told to give doctor a call when the visit completed to get a procedure scheduled    Says she sent in email to doctors office last week and haven't heard anything yet    Pt can be reached at [phone]

## 2018-06-04 NOTE — TELEPHONE ENCOUNTER
Good afternoon I went to the stroke doctor on yesterday so maybe we can get the ball rolling to schedule the test .  Thank you

## 2018-06-05 ENCOUNTER — TELEPHONE (OUTPATIENT)
Dept: ENDOCRINOLOGY | Facility: CLINIC | Age: 53
End: 2018-06-05

## 2018-06-05 NOTE — TELEPHONE ENCOUNTER
Talk to pt and scheduled her biopsy. Also she is aware to hold her blood thinners that morning of biopsy.

## 2018-06-06 RX ORDER — CLOPIDOGREL BISULFATE 75 MG/1
TABLET ORAL
Qty: 90 TABLET | Refills: 1 | Status: SHIPPED | OUTPATIENT
Start: 2018-06-06 | End: 2018-06-07 | Stop reason: SDUPTHER

## 2018-06-07 DIAGNOSIS — M79.642 LEFT HAND PAIN: Primary | ICD-10-CM

## 2018-06-07 RX ORDER — CLOPIDOGREL BISULFATE 75 MG/1
75 TABLET ORAL DAILY
Qty: 90 TABLET | Refills: 2 | Status: SHIPPED | OUTPATIENT
Start: 2018-06-07 | End: 2019-03-15 | Stop reason: SDUPTHER

## 2018-06-08 ENCOUNTER — HOSPITAL ENCOUNTER (OUTPATIENT)
Dept: RADIOLOGY | Facility: OTHER | Age: 53
Discharge: HOME | End: 2018-06-08
Attending: PHYSICIAN ASSISTANT
Payer: MEDICARE

## 2018-06-08 ENCOUNTER — OFFICE VISIT (OUTPATIENT)
Dept: ORTHOPEDICS | Facility: CLINIC | Age: 53
End: 2018-06-08
Payer: MEDICARE

## 2018-06-08 VITALS
DIASTOLIC BLOOD PRESSURE: 93 MMHG | SYSTOLIC BLOOD PRESSURE: 140 MMHG | BODY MASS INDEX: 47.63 KG/M2 | HEART RATE: 70 BPM | HEIGHT: 64 IN | WEIGHT: 279 LBS

## 2018-06-08 DIAGNOSIS — M79.642 LEFT HAND PAIN: ICD-10-CM

## 2018-06-08 DIAGNOSIS — M65.332 TRIGGER MIDDLE FINGER OF LEFT HAND: Primary | ICD-10-CM

## 2018-06-08 PROCEDURE — 99213 OFFICE O/P EST LOW 20 MIN: CPT | Mod: S$PBB,,, | Performed by: PHYSICIAN ASSISTANT

## 2018-06-08 PROCEDURE — 73130 X-RAY EXAM OF HAND: CPT | Mod: 26,LT,, | Performed by: RADIOLOGY

## 2018-06-08 PROCEDURE — 99214 OFFICE O/P EST MOD 30 MIN: CPT | Mod: PBBFAC,25 | Performed by: PHYSICIAN ASSISTANT

## 2018-06-08 PROCEDURE — 99999 PR PBB SHADOW E&M-EST. PATIENT-LVL IV: CPT | Mod: PBBFAC,,, | Performed by: PHYSICIAN ASSISTANT

## 2018-06-08 PROCEDURE — 73130 X-RAY EXAM OF HAND: CPT | Mod: TC,FY,LT

## 2018-06-08 RX ORDER — MELOXICAM 7.5 MG/1
7.5 TABLET ORAL DAILY
Qty: 30 TABLET | Refills: 0 | Status: SHIPPED | OUTPATIENT
Start: 2018-06-08 | End: 2018-07-24 | Stop reason: DRUGHIGH

## 2018-06-08 RX ORDER — MELOXICAM 7.5 MG/1
TABLET ORAL
Qty: 90 TABLET | Refills: 0 | OUTPATIENT
Start: 2018-06-08

## 2018-06-08 NOTE — PROGRESS NOTES
Subjective:      Patient ID: Amna Chawla is a 52 y.o. female.    Chief Complaint: Pain, Swelling, and Numbness of the Left Hand      HPI  Amna Chawla is a right hand dominant 52 y.o. female presenting today for left long finger swelling and locking.  There was not a history of trauma.  Onset of symptoms began 2 weeks ago.  She says that it locks multiple times per day. She passively releases the finger when it locks.  She does admit to intermittent tingling in the left hand and fingers, few times per week. 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 She reports a prior CVA affecting the left side, also a recent CVA in 4/2018 affecting the right side.  She denies history of Diabetes  She has a history of left carpal tunnel release in 2/2017 with Dr. Pugh.       Review of patient's allergies indicates:   Allergen Reactions    Aspirin Hives    Imitrex [sumatriptan] Palpitations    Nsaids (non-steroidal anti-inflammatory drug) Shortness Of Breath    Penicillins Hives and Swelling    Shellfish containing products Anaphylaxis     seafood    Percocet [oxycodone-acetaminophen] Itching     States can take    Reglan [metoclopramide hcl] Other (See Comments)     Parkinsonism          Current Outpatient Prescriptions   Medication Sig Dispense Refill    ARIPiprazole (ABILIFY) 5 MG Tab Take 1 tablet (5 mg total) by mouth every evening. 30 tablet 5    atorvastatin (LIPITOR) 40 MG tablet Take 1 tablet (40 mg total) by mouth every morning. 90 tablet 3    carvedilol (COREG) 25 MG tablet TAKE 1 TABLET(25 MG) BY MOUTH TWICE DAILY WITH MEALS 180 tablet 1    clonazePAM (KLONOPIN) 1 MG tablet Take 1 tablet (1 mg total) by mouth daily as needed for Anxiety. 30 tablet 5    clopidogrel (PLAVIX) 75 mg tablet Take 1 tablet (75 mg total) by mouth once daily. 90 tablet 2    donepezil (ARICEPT) 10 MG tablet Take 1 tablet (10 mg total) by mouth 2 (two) times daily. 180 tablet 3    doxycycline (MONODOX) 100 MG capsule  Take 1 capsule (100 mg total) by mouth 2 (two) times daily. 14 capsule 0    DULoxetine (CYMBALTA) 60 MG capsule Take 1 capsule (60 mg total) by mouth 2 (two) times daily. 60 capsule 5    gabapentin (NEURONTIN) 300 MG capsule Take 3 capsules (900 mg total) by mouth 3 (three) times daily. 810 capsule 12    hydrOXYzine (ATARAX) 50 MG tablet Take 1 tablet (50 mg total) by mouth nightly as needed. 30 tablet 5    lorazepam (ATIVAN) 0.5 MG tablet Take 1 tablet (0.5 mg total) by mouth every 6 (six) hours as needed for Anxiety. 30 tablet 1    magnesium 200 mg Tab Take 200 mg by mouth once daily. 30 each 12    ondansetron (ZOFRAN-ODT) 4 MG TbDL Take 1 tablet (4 mg total) by mouth every 24 hours as needed (nausea). 8 tablet 0    oxyCODONE-acetaminophen (PERCOCET) 5-325 mg per tablet Take 1 tablet by mouth every 4 (four) hours as needed for Pain. 4 tablet 0    pantoprazole (PROTONIX) 40 MG tablet Take 1 tablet (40 mg total) by mouth once daily. 30 tablet 11    rivaroxaban (XARELTO) 20 mg Tab Take 1 tablet (20 mg total) by mouth daily with dinner or evening meal. 30 tablet 1    tiaGABine (GABITRIL) 4 MG tablet Take 1 tablet (4 mg total) by mouth every evening. 30 tablet 12    triamcinolone acetonide 0.1% (KENALOG) 0.1 % cream AAA bid to rash up to 2 weeks 60 g 1    valsartan (DIOVAN) 80 MG tablet Take 1 tablet (80 mg total) by mouth once daily. 90 tablet 3    zolpidem (AMBIEN) 10 mg Tab Take 1 tablet (10 mg total) by mouth nightly as needed. 30 tablet 5    meloxicam (MOBIC) 7.5 MG tablet Take 1 tablet (7.5 mg total) by mouth once daily. 30 tablet 0    topiramate (TOPAMAX) 100 MG tablet Take 2 tablets (200 mg total) by mouth 2 (two) times daily. 120 tablet 12     Current Facility-Administered Medications   Medication Dose Route Frequency Provider Last Rate Last Dose    ketorolac injection 60 mg  60 mg Intramuscular 1 time in Clinic/HOD David Cortez MD        onabotulinumtoxina injection 200 Units  200 Units  "Intramuscular Q90 Days David Cortez MD   200 Units at 05/16/18 1107       Past Medical History:   Diagnosis Date    Anxiety     Asthma     Brain anoxic injury     Coronary artery disease     Defibrillator activation 2013    Depression     History of sudden cardiac arrest 2/2013    PEA arrest with subsequent long-QT    Hx of psychiatric care     Hypertension     Left atrial enlargement 4/11/2018    Pacemaker 2013    Psychiatric problem     Seizures     Sleep difficulties     Stroke     weakness lt side    Therapy        Past Surgical History:   Procedure Laterality Date    breast reduction      BREAST SURGERY      CARDIAC DEFIBRILLATOR PLACEMENT      CARDIAC DEFIBRILLATOR PLACEMENT      CARPAL TUNNEL RELEASE Right     COSMETIC SURGERY      HERNIA REPAIR      HIATAL HERNIA REPAIR      INSERT / REPLACE / REMOVE PACEMAKER      TUBAL LIGATION           Review of Systems:  Review of Systems   Constitution: Negative for chills and fever.   Skin: Negative for rash and suspicious lesions.   Musculoskeletal:        See HPI   Neurological: Positive for headaches (frequent, improved with botox). Negative for dizziness and light-headedness.   Psychiatric/Behavioral: Positive for depression. The patient is not nervous/anxious.          OBJECTIVE:     PHYSICAL EXAM:  Height: 5' 4" (162.6 cm) Weight: 126.6 kg (279 lb)  Vitals:    06/08/18 0822   BP: (!) 140/93   Pulse: 70   Weight: 126.6 kg (279 lb)   Height: 5' 4" (1.626 m)   PainSc: 10-Worst pain ever   PainLoc: Hand     General    Vitals reviewed.  Constitutional: She is oriented to person, place, and time. She appears well-developed and well-nourished.   HENT:   Head: Normocephalic and atraumatic.   Neck: Normal range of motion.   Cardiovascular: Normal rate.    Pulmonary/Chest: Effort normal. No respiratory distress.   Neurological: She is alert and oriented to person, place, and time.   Psychiatric: She has a normal mood and affect. Her behavior is " normal. Judgment and thought content normal.             Musculoskeletal:  Well-healed surgical scar left palm from carpal tunnel release.  Mild edema of the left hand compared to right, including the left long finger.  Tender to palpation over the A1 pulley of the left long finger, nontender dorsally. Decreased range of motion of the left fingers due to fear and pain. Passive range of motion better than active range of motion. Sagittal band over the left long finger does appear to move laterally, however sagittal band disruption not definitive.  Neurovascularly intact-good sensation but ulnar and median nerve sensation slightly decreased on the right, good motor function, good capillary refill, 2+ radial pulses. Positive Tinel's left wrist, negative Durkan's bilaterally.    RADIOGRAPHS:  Left Hand X-Ray, 6/8/18  FINDINGS:  No acute fractures or dislocations or significant arthritic changes.  There is no erosive processes.  Soft tissues are unremarkable.   Impression   As above.     Comments: I have personally reviewed the imaging and I agree with the above radiologist's report.    ASSESSMENT/PLAN:   Amna was seen today for pain, swelling and numbness.    Diagnoses and all orders for this visit:    Trigger middle finger of left hand    Other orders  -     meloxicam (MOBIC) 7.5 MG tablet; Take 1 tablet (7.5 mg total) by mouth once daily.           - We talked at length about the anatomy and pathophysiology of   Encounter Diagnosis   Name Primary?    Trigger middle finger of left hand Yes       - discussed conservative and surgical treatment options for trigger finger, discussed trigger finger splint, NSAIDs, steroid injection, and surgery. She has no interest in steroid injections. She stated that she would like to try the splint and nonsteroidals  - Patient reports that she is not allergic to NSAIDs, she just doesn't take them often. Denies shortness of breath with NSAIDs.   - Mobic 7.5 mg daily, discontinue if  any side effects  - follow-up in 4-6 weeks with Dr. Pugh, as she is a prior patient of his.  - call with questions or concerns    Disclaimer: This note has been generated using voice-recognition software. There may be typographical errors that have been missed during proof-reading.

## 2018-06-11 RX ORDER — RIVAROXABAN 20 MG/1
TABLET, FILM COATED ORAL
Qty: 30 TABLET | Refills: 0 | OUTPATIENT
Start: 2018-06-11

## 2018-06-12 ENCOUNTER — TELEPHONE (OUTPATIENT)
Dept: ORTHOPEDICS | Facility: CLINIC | Age: 53
End: 2018-06-12

## 2018-06-12 NOTE — TELEPHONE ENCOUNTER
Returned call to patient - explained that there was no available appt - will work in 6/20 @ 8:30am. Patient agreed with time. Will place on wait list

## 2018-06-12 NOTE — TELEPHONE ENCOUNTER
----- Message from Silvia Kennedy sent at 6/12/2018  2:23 PM CDT -----  Contact: Pt  Name of Who is Calling: PUSHPA KEY [[id number]]      What is the request in detail: patient states she is experiencing throbbing pain in her middle finger on the left hand patient is requesting to be seen sooner than the next available...... Please contact to further discuss and advise       Can the clinic reply by [facility]: Yes      What Number to Call Back if not in [facility]: [phone]

## 2018-06-14 ENCOUNTER — PATIENT MESSAGE (OUTPATIENT)
Dept: ELECTROPHYSIOLOGY | Facility: CLINIC | Age: 53
End: 2018-06-14

## 2018-06-15 RX ORDER — RIVAROXABAN 20 MG/1
TABLET, FILM COATED ORAL
Qty: 30 TABLET | Refills: 0 | OUTPATIENT
Start: 2018-06-15

## 2018-06-15 NOTE — TELEPHONE ENCOUNTER
----- Message from Sosa Edwards sent at 6/15/2018  9:26 AM CDT -----  Contact: patient  Please call pt at [phone] if any questions. Refill needed for Plavix 75 mg and Xarelto 20 mg called into [facility] at [phone]. Out of meds. Dr Mejia pt    Thank you

## 2018-06-15 NOTE — TELEPHONE ENCOUNTER
Spoke with patient and advised that Dr Mejia will refill the Xarelto, but Dr Ruth needs to refill the Plavix. She verbalizes understanding. Rx sent to Dr Mejia for signature.

## 2018-06-20 ENCOUNTER — OFFICE VISIT (OUTPATIENT)
Dept: ORTHOPEDICS | Facility: CLINIC | Age: 53
End: 2018-06-20
Payer: MEDICARE

## 2018-06-20 VITALS
WEIGHT: 279 LBS | DIASTOLIC BLOOD PRESSURE: 87 MMHG | HEIGHT: 64 IN | RESPIRATION RATE: 18 BRPM | BODY MASS INDEX: 47.63 KG/M2 | HEART RATE: 80 BPM | SYSTOLIC BLOOD PRESSURE: 134 MMHG

## 2018-06-20 DIAGNOSIS — M65.332 TRIGGER MIDDLE FINGER OF LEFT HAND: Primary | ICD-10-CM

## 2018-06-20 PROCEDURE — 99999 PR PBB SHADOW E&M-EST. PATIENT-LVL IV: CPT | Mod: PBBFAC,,, | Performed by: PLASTIC SURGERY

## 2018-06-20 PROCEDURE — 99213 OFFICE O/P EST LOW 20 MIN: CPT | Mod: 25,S$PBB,, | Performed by: PLASTIC SURGERY

## 2018-06-20 PROCEDURE — 99214 OFFICE O/P EST MOD 30 MIN: CPT | Mod: PBBFAC | Performed by: PLASTIC SURGERY

## 2018-06-20 PROCEDURE — 20550 NJX 1 TENDON SHEATH/LIGAMENT: CPT | Mod: F2,S$PBB,, | Performed by: PLASTIC SURGERY

## 2018-06-20 PROCEDURE — 20550 NJX 1 TENDON SHEATH/LIGAMENT: CPT | Mod: PBBFAC | Performed by: PLASTIC SURGERY

## 2018-06-20 RX ORDER — LIDOCAINE HYDROCHLORIDE 10 MG/ML
1 INJECTION, SOLUTION EPIDURAL; INFILTRATION; INTRACAUDAL; PERINEURAL
Status: COMPLETED | OUTPATIENT
Start: 2018-06-20 | End: 2018-06-20

## 2018-06-20 RX ORDER — TRIAMCINOLONE ACETONIDE 40 MG/ML
40 INJECTION, SUSPENSION INTRA-ARTICULAR; INTRAMUSCULAR
Status: COMPLETED | OUTPATIENT
Start: 2018-06-20 | End: 2018-06-20

## 2018-06-20 RX ADMIN — LIDOCAINE HYDROCHLORIDE 10 MG: 10 INJECTION, SOLUTION EPIDURAL; INFILTRATION; INTRACAUDAL; PERINEURAL at 09:06

## 2018-06-20 RX ADMIN — TRIAMCINOLONE ACETONIDE 40 MG: 40 INJECTION, SUSPENSION INTRA-ARTICULAR; INTRAMUSCULAR at 09:06

## 2018-06-20 NOTE — PROGRESS NOTES
"HISTORY OF PRESENT ILLNESS:  Ms. Chawla returns today with continued complaint   of left middle finger pain, swelling and triggering.  She was last seen in the   clinic by one of the PAs and was diagnosed with a left middle finger trigger   digit.  She was placed on meloxicam.  She declined an injection at that time.    She continues to complain of stiffness, pain and triggering.  She has no other   complaints.    PHYSICAL EXAMINATION:  VITAL SIGNS:  /87 (BP Location: Left arm, Patient Position: Sitting, BP Method: Large (Automatic))   Pulse 80   Resp 18   Ht 5' 4" (1.626 m)   Wt 126.6 kg (279 lb)   BMI 47.89 kg/m²     GENERAL:  No acute distress, alert and oriented x3, cooperative, well nourished.  LEFT HAND:  Well-healed carpal tunnel incision.  There is tenderness over the A1   pulley of the middle finger with some mild swelling distally.  She has active   triggering with active flexion and extension.  She has full range of motion of   the thumb, index, ring and small fingers.    PROCEDURE:  PROCEDURE:  I have explained the risks, benefits, and alternatives of the procedure in detail.  The patient voices understanding and all questions have been answered. So after I performed a sterile prep of the skin, the level of there A-1 pulley of the left long finger is injected using a 25 gauge needle from the volar approach with a  combination of 1cc 1% plain Lidocaine and 40 mg of Kenalog.  The patient is cautioned and immediate relief of pain is secondary to the local anesthetic and will be temporary.  After the anesthetic wears off there may be a increase in pain that may last for a few hours or a few days and they should use ice to help alleviate this flair up of pain.         ASSESSMENT:  Left middle finger trigger digit.    PLAN:  I discussed the pathophysiology, progression and treatment of trigger   digits with the patient in detail.  I suggested that she consider the   corticosteroid injection today " in clinic.  I discussed the possibility of   recurrence after the injection.  The patient wished to receive the injection   today.  Please see the procedure note above.  She will follow up me in eight   weeks if her symptoms fail improve or worsen.  All questions and concerns were   addressed prior to discharge.      GUERO/RICHA  dd: 06/20/2018 09:17:33 (CDT)  td: 06/21/2018 03:11:52 (CDT)  Doc ID   #[id number]  Job ID #[id number]    CC:       Dictation Confirmation Code: [id number]  Matt Pugh Jr. MD  06/20/2018  9:14 AM

## 2018-06-25 ENCOUNTER — TELEPHONE (OUTPATIENT)
Dept: ELECTROPHYSIOLOGY | Facility: CLINIC | Age: 53
End: 2018-06-25

## 2018-06-25 NOTE — TELEPHONE ENCOUNTER
----- Message from Traci Martinez sent at 6/25/2018  1:58 PM CDT -----  Contact: Patient  The Pt thinks she is due for a device tune up and she would like to come in soon if she needs to, and she also thinks that her home monitor is not working. Please call her back @ [phone]. Thanks, Traci

## 2018-06-25 NOTE — TELEPHONE ENCOUNTER
"Returned the patient's call on this afternoon.  As it turns out Mrs. Chawla had previously called the Device Clinic an hour or so before she called the [facility].  Informed the patient her Bi[employer] remote ICD home monitor is working and read her ICD on 6/25/18 with no anomalies recorded.  Patient expressed how " she does not like this home monitor because it doesn't have a button she can press to send information whenever she feel something in her chest the way her other monitor did."  Informed the patient the monitor does a full reading every night she sleeps in front of the monitor.  Patient had an ICD generator change on 10/13/17 in which her previous ICD was a [employer] and this one is a [employer].  Patient requested to have her next routine ICD check in the clinic.  Appointment for ICD check in the clinic was scheduled with the patient to 8/9/18 @ 1:00 pm with the remote check for August being cancelled.  Patient verbalized understanding to all of the above.  "

## 2018-06-27 ENCOUNTER — TELEPHONE (OUTPATIENT)
Dept: NEUROLOGY | Facility: CLINIC | Age: 53
End: 2018-06-27

## 2018-06-27 ENCOUNTER — PATIENT MESSAGE (OUTPATIENT)
Dept: NEUROLOGY | Facility: CLINIC | Age: 53
End: 2018-06-27

## 2018-06-27 NOTE — TELEPHONE ENCOUNTER
Called and spoke to Patient.informed her that she should be evaluated at the ED if her headache is severe and also to be seen due to her co-morbilities.She states she is going to the [facility] facility on [facility].

## 2018-06-30 ENCOUNTER — EXTERNAL CHRONIC CARE MANAGEMENT (OUTPATIENT)
Dept: PRIMARY CARE CLINIC | Facility: CLINIC | Age: 53
End: 2018-06-30
Payer: MEDICARE

## 2018-06-30 PROCEDURE — 99490 CHRNC CARE MGMT STAFF 1ST 20: CPT | Mod: PBBFAC | Performed by: PSYCHIATRY & NEUROLOGY

## 2018-06-30 PROCEDURE — 99490 CHRNC CARE MGMT STAFF 1ST 20: CPT | Mod: S$PBB,,, | Performed by: PSYCHIATRY & NEUROLOGY

## 2018-07-05 DIAGNOSIS — G47.00 INSOMNIA, UNSPECIFIED TYPE: ICD-10-CM

## 2018-07-09 ENCOUNTER — OFFICE VISIT (OUTPATIENT)
Dept: NEUROLOGY | Facility: CLINIC | Age: 53
End: 2018-07-09
Payer: MEDICARE

## 2018-07-09 VITALS
HEART RATE: 66 BPM | BODY MASS INDEX: 47.5 KG/M2 | DIASTOLIC BLOOD PRESSURE: 83 MMHG | WEIGHT: 278.25 LBS | SYSTOLIC BLOOD PRESSURE: 138 MMHG | HEIGHT: 64 IN

## 2018-07-09 DIAGNOSIS — I63.9 CEREBROVASCULAR ACCIDENT (CVA), UNSPECIFIED MECHANISM: Primary | ICD-10-CM

## 2018-07-09 DIAGNOSIS — Z91.81: ICD-10-CM

## 2018-07-09 PROCEDURE — 99213 OFFICE O/P EST LOW 20 MIN: CPT | Mod: PBBFAC | Performed by: PSYCHIATRY & NEUROLOGY

## 2018-07-09 PROCEDURE — 99213 OFFICE O/P EST LOW 20 MIN: CPT | Mod: S$PBB,,, | Performed by: PSYCHIATRY & NEUROLOGY

## 2018-07-09 PROCEDURE — 99999 PR PBB SHADOW E&M-EST. PATIENT-LVL III: CPT | Mod: PBBFAC,,, | Performed by: PSYCHIATRY & NEUROLOGY

## 2018-07-09 RX ORDER — ZOLPIDEM TARTRATE 12.5 MG/1
TABLET, FILM COATED, EXTENDED RELEASE ORAL
Qty: 30 TABLET | Refills: 0 | OUTPATIENT
Start: 2018-07-09

## 2018-07-09 NOTE — LETTER
July 17, 2018      Sam Allred MD  [address]  [place] [address]           [facility]  Neurology  [address]  [facility] [address]  Phone: [phone]  Fax: [phone]          Patient: Amna Chawla   MR Number: [id number]   YOB: 1965   Date of Visit: 7/9/2018       Dear Dr. Sam Allred:    Thank you for referring Amna Chawla to me for evaluation. Attached you will find relevant portions of my assessment and plan of care.    If you have questions, please do not hesitate to call me. I look forward to following Amna Chawla along with you.    Sincerely,    Toby Paredes MD    Enclosure  CC:  No Recipients    If you would like to receive this communication electronically, please contact [email or web address] or [phone] to request more information on [facility] [employer] [facility] access.    For providers and/or their staff who would like to refer a patient to [facility], please contact us through our one-stop-shop provider referral line, [facility], at [phone].    If you feel you have received this communication in error or would no longer like to receive these types of communications, please e-mail [email or web address]

## 2018-07-10 ENCOUNTER — TELEPHONE (OUTPATIENT)
Dept: ENDOCRINOLOGY | Facility: CLINIC | Age: 53
End: 2018-07-10

## 2018-07-10 NOTE — TELEPHONE ENCOUNTER
**Hi - please ask pt to hold Xarelto the day before, and the day OF the thyroid biopsy.  She can continue plavix.    Thank you.

## 2018-07-10 NOTE — TELEPHONE ENCOUNTER
----- Message from Violeta Zarate sent at 7/10/2018  9:25 AM CDT -----  Contact: pt  Pt calling for advise on  Biopsy scheduled for Friday   -   Received no instructions  As was told    Also pt takes 2 blood thinners    Was suppose to be instructed when to discontinue     Please call pt [phone]   thanks

## 2018-07-10 NOTE — TELEPHONE ENCOUNTER
Talk to pt she understand that's she will stop taking her blood thinners the day before and day of the biopsy

## 2018-07-11 RX ORDER — MELOXICAM 7.5 MG/1
TABLET ORAL
Qty: 30 TABLET | Refills: 0 | OUTPATIENT
Start: 2018-07-11

## 2018-07-13 ENCOUNTER — HOSPITAL ENCOUNTER (OUTPATIENT)
Dept: ENDOCRINOLOGY | Facility: CLINIC | Age: 53
Discharge: HOME | End: 2018-07-13
Attending: INTERNAL MEDICINE
Payer: MEDICARE

## 2018-07-13 DIAGNOSIS — E04.2 MULTIPLE THYROID NODULES: ICD-10-CM

## 2018-07-13 DIAGNOSIS — E04.1 THYROID NODULE: Primary | ICD-10-CM

## 2018-07-13 PROCEDURE — 10022 US FINE NEEDLE ASPIRATION WITH IMAGING: CPT | Mod: ,,, | Performed by: INTERNAL MEDICINE

## 2018-07-13 PROCEDURE — 76942 ECHO GUIDE FOR BIOPSY: CPT | Mod: ,,, | Performed by: INTERNAL MEDICINE

## 2018-07-13 PROCEDURE — 88173 CYTOPATH EVAL FNA REPORT: CPT | Performed by: PATHOLOGY

## 2018-07-13 PROCEDURE — 88173 CYTOPATH EVAL FNA REPORT: CPT | Mod: 26,,, | Performed by: PATHOLOGY

## 2018-07-16 ENCOUNTER — TELEPHONE (OUTPATIENT)
Dept: NEUROLOGY | Facility: CLINIC | Age: 53
End: 2018-07-16

## 2018-07-16 NOTE — TELEPHONE ENCOUNTER
----- Message from Cici Hill sent at 7/16/2018  9:53 AM CDT -----  Contact: self @ [phone]  Calling to speak with Dr Ruth to see what she is suppose to do next.

## 2018-07-16 NOTE — TELEPHONE ENCOUNTER
Patient called this morning and said she told the phone staff that her nose had been bleeding since 8:00 am and it was still bleeding, she also informed them about her stopping her blood thinners because of biopsy and recently them back. She was calling Dr. Ruth to see what should be done and that her nose had not stopped bleeding when I called patient back. I informed to go to ED and that I would also forward the message over to Dr. Ruth.

## 2018-07-19 ENCOUNTER — TELEPHONE (OUTPATIENT)
Dept: ELECTROPHYSIOLOGY | Facility: CLINIC | Age: 53
End: 2018-07-19

## 2018-07-19 ENCOUNTER — TELEPHONE (OUTPATIENT)
Dept: ENDOCRINOLOGY | Facility: CLINIC | Age: 53
End: 2018-07-19

## 2018-07-19 ENCOUNTER — PATIENT MESSAGE (OUTPATIENT)
Dept: ENDOCRINOLOGY | Facility: CLINIC | Age: 53
End: 2018-07-19

## 2018-07-19 DIAGNOSIS — G47.00 INSOMNIA, UNSPECIFIED TYPE: ICD-10-CM

## 2018-07-19 DIAGNOSIS — I10 ESSENTIAL HYPERTENSION: Primary | ICD-10-CM

## 2018-07-19 NOTE — TELEPHONE ENCOUNTER
----- Message from Avelino Mejia MD sent at 7/19/2018  7:00 AM CDT -----  Contact: self  She needs to call her pharmacy first and see if her current medication is from the listed , not all are. If her current prescription is then she needs to ask her pharmacy if they have valsartan that is not. She should let us know if her pharmacy states all of their valsartan is affected and if so we can find an alternate drug.    Thanks    Avelino  ----- Message -----  From: Yuridia Moralez RN  Sent: 7/18/2018   4:54 PM  To: Avelino Mejia MD    Do you want to switch her rx due to the recall?  ----- Message -----  From: Deena Sheth MA  Sent: 7/18/2018   3:39 PM  To: Yuridia Moralez RN    Pt. is calling because of recall on Valsartan.Please call. Last visit  4/3/18. .

## 2018-07-19 NOTE — TELEPHONE ENCOUNTER
Attempting to reach pt to discuss below, no answer.  Message left for pt to return call to office.

## 2018-07-19 NOTE — TELEPHONE ENCOUNTER
Spoke to [facility] - They carry the [employer] brand Valsartan that has been recalled.  They do not have the option of transferring rx to another location, as all locations have same brand.    Called [facility] pharmacy to see if we could have the rx transferred with a brand that has not been recalled, however they do not have enough quantity to fill rx of 80 mg daily.     Message to be sent to Dr. Mejia to advise on possible new rx.

## 2018-07-19 NOTE — TELEPHONE ENCOUNTER
----- Message from Hansa Pedraza sent at 7/19/2018  3:07 PM CDT -----  Contact: Pt    [phone]  Test Results    Type of Test:  Biopsy   Date of Test:  7/13  Communication Preference:  Phone   Additional Information:

## 2018-07-19 NOTE — TELEPHONE ENCOUNTER
----- Message from Julia Moralez MA sent at 7/19/2018 12:04 PM CDT -----  Contact: Patient  PATIENT RETURNING YOUR CALL>  :)  ----- Message -----  From: Traci Martinez  Sent: 7/19/2018   8:54 AM  To: Roberto ABREU Staff    The Pt is returning a call. Please call her back @ [phone]. Thanks, Traci

## 2018-07-20 ENCOUNTER — PATIENT MESSAGE (OUTPATIENT)
Dept: ENDOCRINOLOGY | Facility: CLINIC | Age: 53
End: 2018-07-20

## 2018-07-20 DIAGNOSIS — E04.2 MULTIPLE THYROID NODULES: Primary | ICD-10-CM

## 2018-07-20 RX ORDER — LOSARTAN POTASSIUM 50 MG/1
50 TABLET ORAL DAILY
Qty: 90 TABLET | Refills: 3 | Status: SHIPPED | OUTPATIENT
Start: 2018-07-20 | End: 2019-03-20 | Stop reason: SDUPTHER

## 2018-07-20 RX ORDER — LORAZEPAM 1 MG/1
1 TABLET ORAL
Qty: 1 TABLET | Refills: 0 | Status: ON HOLD | OUTPATIENT
Start: 2018-07-20 | End: 2018-09-30 | Stop reason: HOSPADM

## 2018-07-20 NOTE — TELEPHONE ENCOUNTER
Spoke to Ms. Chawla, let her know Dr. Mejia sent in rx for new BP medication to her pharmacy.  Went over medication, dosage, and frequency with her.    She verbalizes understanding and appreciates call.

## 2018-07-21 PROCEDURE — 99283 EMERGENCY DEPT VISIT LOW MDM: CPT

## 2018-07-21 PROCEDURE — 99282 EMERGENCY DEPT VISIT SF MDM: CPT | Mod: ,,, | Performed by: EMERGENCY MEDICINE

## 2018-07-22 ENCOUNTER — HOSPITAL ENCOUNTER (EMERGENCY)
Facility: HOSPITAL | Age: 53
Discharge: HOME | End: 2018-07-22
Attending: EMERGENCY MEDICINE
Payer: MEDICARE

## 2018-07-22 VITALS
HEART RATE: 62 BPM | DIASTOLIC BLOOD PRESSURE: 88 MMHG | OXYGEN SATURATION: 100 % | TEMPERATURE: 98 F | SYSTOLIC BLOOD PRESSURE: 178 MMHG | RESPIRATION RATE: 17 BRPM

## 2018-07-22 DIAGNOSIS — R04.0 EPISTAXIS: Primary | ICD-10-CM

## 2018-07-22 RX ORDER — OXYMETAZOLINE HCL 0.05 %
SPRAY, NON-AEROSOL (ML) NASAL
Status: COMPLETED
Start: 2018-07-22 | End: 2018-07-22

## 2018-07-22 NOTE — ED NOTES
Afrin was pulled from [employer] via override by another RN and administered by Dr. Rodriguez.

## 2018-07-22 NOTE — ED NOTES
"Chief Complaint   Patient presents with    Epistaxis     Pt presents to ED c/o nosebleed  with clots to R nostril since 2100. Pt takes blood thinners.      Pt presents to ED with c/o epistaxis that began around 2100 last night. Pt states nose bleeding has been constant and has had "clots" coming out. Pt has multiple tissues at bedside that are wet with bright red blood. Pt has gauze in place to right nostril with bleeding controlled at this time. Pt endorses HX of controlled HTN with multiple medications at home. Pt denies taking BP any time recently. +Blood thinner use. Denies falling/trauma.    Active Ambulatory Problems     Diagnosis Date Noted    Anoxia 02/08/2013    Pneumonia 02/08/2013    Prolonged Q-T interval on ECG 02/08/2013    CHI (closed head injury) 02/19/2013    Complete AV block 02/19/2013    Cognitive deficits 02/19/2013    Pacemaker 09/05/2013    HLD (hyperlipidemia) 09/05/2013    Paresthesia 11/01/2013    History of CVA (cerebrovascular accident) 12/03/2013    Knee pain 01/15/2014    Atypical chest pain 02/13/2014    Hiatal hernia 02/13/2014    GERD (gastroesophageal reflux disease) 02/13/2014    Special screening for malignant neoplasms, colon 06/24/2014    Cervicalgia 08/28/2014    Occipital neuralgia 08/28/2014    Chronic migraine without aura, with intractable migraine, so stated, with status migrainosus 08/28/2014    Obesity, Class III, BMI 40-49.9 (morbid obesity) 09/17/2014    History of sudden cardiac arrest 12/10/2014    Biventricular automatic implantable cardioverter defibrillator in situ 12/10/2014    Left-sided headache 05/29/2015    Transient alteration of awareness     Convulsion 05/30/2015    Essential hypertension 05/30/2015    Neck muscle spasm 09/09/2015    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    Anxiety 03/21/2016    Keratoconjunctivitis sicca of both eyes not specified as Sjogren's 06/16/2016    Palpitations 09/15/2016    Right ankle pain 09/16/2016 "    Right carpal tunnel syndrome 12/09/2016    Impaired mobility and ADLs 01/16/2017    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 01/16/2017    Headaches due to old head injury 01/23/2017    Decreased ROM of neck 01/23/2017    Left carpal tunnel syndrome 02/03/2017    Upper airway resistance syndrome 02/21/2017    Paroxysmal atrial fibrillation 03/09/2017    Long term (current) use of anticoagulants 03/10/2017    Bilateral shoulder pain 04/12/2017    Decreased ROM of left shoulder 04/12/2017    Impaired functional mobility, balance, gait, and endurance 04/12/2017    Unspecified sleep apnea     Dyspnea on exertion 09/12/2017    Nonischemic cardiomyopathy from RV pacing 09/27/2017    History of DVT (deep vein thrombosis) 02/21/2018    Frequent falls 03/07/2018    Depression, major, recurrent, moderate 04/03/2018    Insomnia 04/03/2018    Left atrial enlargement 04/11/2018    TIA (transient ischemic attack) 04/12/2018    Hyponatremia 04/12/2018    Thyroid cyst 04/16/2018    Upper respiratory tract infection 05/08/2018    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     Resolved Ambulatory Problems     Diagnosis Date Noted    Cardiac arrest 02/07/2013    HTN (hypertension) 02/07/2013    Acute respiratory failure 02/07/2013    Hypokalemia 02/07/2013    Encephalopathy 02/08/2013    Heart block AV second degree 02/11/2013    Obesity, Class I, BMI 30-34.9 02/17/2013    DVT (deep venous thrombosis) 01/10/2014    Chest pain at rest 02/02/2014    Convulsions 05/29/2015    Right shoulder pain 02/29/2016    Chest pain 03/16/2017    Decreased ROM of lumbar spine 09/11/2017    Muscle weakness of lower extremity 09/11/2017    Muscle tenderness 09/11/2017    Cardiomyopathy 10/13/2017     Past Medical History:   Diagnosis Date    Anxiety     Asthma     Brain anoxic injury     Coronary artery disease     Defibrillator activation 2013    Depression     History of sudden  cardiac arrest 2/2013    Hx of psychiatric care     Hypertension     Left atrial enlargement 4/11/2018    Pacemaker 2013    Psychiatric problem     Seizures     Sleep difficulties     Stroke     Therapy      Review of patient's allergies indicates:   Allergen Reactions    Aspirin Hives    Imitrex [sumatriptan] Palpitations    Nsaids (non-steroidal anti-inflammatory drug) Shortness Of Breath    Penicillins Hives and Swelling    Shellfish containing products Anaphylaxis     seafood    Percocet [oxycodone-acetaminophen] Itching     States can take    Reglan [metoclopramide hcl] Other (See Comments)     Parkinsonism      Adult physical assessment:    Neuro: Patient AAOx4.   HEENT: Gauze present to right nostril with slight blood present, but bleeding controlled at this time.  Cardiac:  No C/O CP at this time.  Respiratory: Airway is open and patent. No C/O SOB at this time.  Musculoskeletal: Patient moving all extremities spontaneously. No obvious swelling or deformity visible.

## 2018-07-22 NOTE — ED PROVIDER NOTES
Encounter Date: 7/21/2018    SCRIBE #1 NOTE: I, Lucian Bennett, am scribing for, and in the presence of,  Dr. Rodriguez. I have scribed the entire note.       History     Chief Complaint   Patient presents with    Epistaxis     Pt presents to ED c/o nosebleed  with clots to R nostril since 2100. Pt takes blood thinners.      Time patient was seen by the provider: 3:11 AM      The patient is a 53 y.o. female with co-morbidities including: HTN, CAD, seizures, pacemaker in place, and left atrial enlargement, who presents to the ED with a complaint of epistaxis x6 hours. She is on Eloquis and Plavix. She has been coughing up blood. She denies cold symptoms and other complaints.        The history is provided by the patient.     Review of patient's allergies indicates:   Allergen Reactions    Aspirin Hives    Imitrex [sumatriptan] Palpitations    Nsaids (non-steroidal anti-inflammatory drug) Shortness Of Breath    Penicillins Hives and Swelling    Shellfish containing products Anaphylaxis     seafood    Percocet [oxycodone-acetaminophen] Itching     States can take    Reglan [metoclopramide hcl] Other (See Comments)     Parkinsonism      Past Medical History:   Diagnosis Date    Anxiety     Asthma     Brain anoxic injury     Coronary artery disease     Defibrillator activation 2013    Depression     History of sudden cardiac arrest 2/2013    PEA arrest with subsequent long-QT    Hx of psychiatric care     Hypertension     Left atrial enlargement 4/11/2018    Pacemaker 2013    Psychiatric problem     Seizures     Sleep difficulties     Stroke     weakness lt side    Therapy      Past Surgical History:   Procedure Laterality Date    breast reduction      BREAST SURGERY      CARDIAC DEFIBRILLATOR PLACEMENT      CARDIAC DEFIBRILLATOR PLACEMENT      CARPAL TUNNEL RELEASE Right     COSMETIC SURGERY      HERNIA REPAIR      HIATAL HERNIA REPAIR      INSERT / REPLACE / REMOVE PACEMAKER      TUBAL  LIGATION       Family History   Problem Relation Age of Onset    Hypertension Mother     COPD Unknown     Heart failure Unknown     Glaucoma Maternal Grandmother     Glaucoma Paternal Grandmother      Social History   Substance Use Topics    Smoking status: Never Smoker    Smokeless tobacco: Never Used    Alcohol use No     Review of Systems   HENT: Positive for nosebleeds. Negative for congestion and sore throat.    Respiratory: Positive for cough (blood, after nose bleed).    All other systems reviewed and are negative.      Physical Exam     Initial Vitals [07/22/18 0005]   BP Pulse Resp Temp SpO2   (!) 156/75 69 18 98.4 °F (36.9 °C) 99 %      MAP       --         Physical Exam    Nursing note and vitals reviewed.  Constitutional: She appears well-developed and well-nourished. She is not diaphoretic. No distress.   HENT:   Head: Atraumatic.   Nose:       Neck: Neck supple.   Cardiovascular: Normal rate.   Pulmonary/Chest: Breath sounds normal.   Abdominal: Soft.   Neurological: She is alert and oriented to person, place, and time.   Awake.   Skin: Skin is warm and dry. Capillary refill takes less than 2 seconds.         ED Course   Procedures  Labs Reviewed - No data to display       Imaging Results    None          Medical Decision Making:   History:   Old Medical Records: I decided to obtain old medical records.  ED Management:  Pt cleared nose, inhaled afrin and I placed a nasal tampon for 30 minutes. No bleeding after removal. No evidence of posterior bleed. Dc home return if worse. Hold for 30 minutes first without stopping first and come in if this doesn't work.             Scribe Attestation:   Scribe #1: I performed the above scribed service and the documentation accurately describes the services I performed. I attest to the accuracy of the note.    Attending Attestation:             Attending ED Notes:   4:09 AM  Removed nasal tampon and there is no more bleeding.      I, Dr. Lulu Rodriguez,  personally performed the services described in this documentation. All medical record entries made by the scribe were at my direction and in my presence.  I have reviewed the chart and agree that the record reflects my personal performance and is accurate and complete. Lulu Rodriguez MD.  9:34 AM 07/22/2018           Clinical Impression:   The encounter diagnosis was Epistaxis.      Disposition:   Disposition: Discharged  Condition: Stable                        Lulu Rodriguez MD  07/22/18 0937

## 2018-07-23 ENCOUNTER — TELEPHONE (OUTPATIENT)
Dept: ENDOCRINOLOGY | Facility: CLINIC | Age: 53
End: 2018-07-23

## 2018-07-23 DIAGNOSIS — G47.00 INSOMNIA, UNSPECIFIED TYPE: ICD-10-CM

## 2018-07-23 RX ORDER — ZOLPIDEM TARTRATE 12.5 MG/1
TABLET, FILM COATED, EXTENDED RELEASE ORAL
Qty: 30 TABLET | Refills: 0 | Status: SHIPPED | OUTPATIENT
Start: 2018-07-23 | End: 2018-08-27 | Stop reason: SDUPTHER

## 2018-07-23 RX ORDER — ZOLPIDEM TARTRATE 12.5 MG/1
TABLET, FILM COATED, EXTENDED RELEASE ORAL
Qty: 30 TABLET | Refills: 0 | OUTPATIENT
Start: 2018-07-23

## 2018-07-24 ENCOUNTER — OFFICE VISIT (OUTPATIENT)
Dept: INTERNAL MEDICINE | Facility: CLINIC | Age: 53
End: 2018-07-24
Payer: MEDICARE

## 2018-07-24 VITALS
DIASTOLIC BLOOD PRESSURE: 80 MMHG | HEART RATE: 70 BPM | SYSTOLIC BLOOD PRESSURE: 134 MMHG | WEIGHT: 275.56 LBS | BODY MASS INDEX: 47.04 KG/M2 | HEIGHT: 64 IN | OXYGEN SATURATION: 99 %

## 2018-07-24 DIAGNOSIS — S06.9X0S COGNITIVE DEFICIT AS LATE EFFECT OF TRAUMATIC BRAIN INJURY: ICD-10-CM

## 2018-07-24 DIAGNOSIS — I10 ESSENTIAL HYPERTENSION: ICD-10-CM

## 2018-07-24 DIAGNOSIS — I48.0 PAROXYSMAL ATRIAL FIBRILLATION: ICD-10-CM

## 2018-07-24 DIAGNOSIS — E04.1 THYROID NODULE: ICD-10-CM

## 2018-07-24 DIAGNOSIS — G47.8 UPPER AIRWAY RESISTANCE SYNDROME: ICD-10-CM

## 2018-07-24 DIAGNOSIS — Z95.810 BIVENTRICULAR AUTOMATIC IMPLANTABLE CARDIOVERTER DEFIBRILLATOR IN SITU: ICD-10-CM

## 2018-07-24 DIAGNOSIS — K21.9 GASTROESOPHAGEAL REFLUX DISEASE, ESOPHAGITIS PRESENCE NOT SPECIFIED: ICD-10-CM

## 2018-07-24 DIAGNOSIS — R56.9 CONVULSIONS, UNSPECIFIED CONVULSION TYPE: ICD-10-CM

## 2018-07-24 DIAGNOSIS — R04.0 EPISTAXIS: Primary | ICD-10-CM

## 2018-07-24 DIAGNOSIS — G43.711 CHRONIC MIGRAINE WITHOUT AURA, WITH INTRACTABLE MIGRAINE, SO STATED, WITH STATUS MIGRAINOSUS: ICD-10-CM

## 2018-07-24 DIAGNOSIS — F06.8 COGNITIVE DEFICIT AS LATE EFFECT OF TRAUMATIC BRAIN INJURY: ICD-10-CM

## 2018-07-24 PROBLEM — J06.9 UPPER RESPIRATORY TRACT INFECTION: Status: RESOLVED | Noted: 2018-05-08 | Resolved: 2018-07-24

## 2018-07-24 PROCEDURE — 99999 PR PBB SHADOW E&M-EST. PATIENT-LVL V: CPT | Mod: PBBFAC,GC,,

## 2018-07-24 PROCEDURE — 99215 OFFICE O/P EST HI 40 MIN: CPT | Mod: PBBFAC

## 2018-07-24 PROCEDURE — 99213 OFFICE O/P EST LOW 20 MIN: CPT | Mod: S$PBB,GC,,

## 2018-07-24 RX ORDER — MELOXICAM 15 MG/1
15 TABLET ORAL DAILY
Qty: 30 TABLET | Refills: 3 | Status: SHIPPED | OUTPATIENT
Start: 2018-07-24 | End: 2018-10-09

## 2018-07-24 RX ORDER — CETIRIZINE HYDROCHLORIDE 10 MG/1
10 TABLET ORAL DAILY
Qty: 90 TABLET | Refills: 11 | COMMUNITY
Start: 2018-07-24 | End: 2018-10-09

## 2018-07-24 RX ORDER — ONDANSETRON 4 MG/1
4 TABLET, ORALLY DISINTEGRATING ORAL EVERY 12 HOURS PRN
Qty: 10 TABLET | Refills: 1 | Status: SHIPPED | OUTPATIENT
Start: 2018-07-24 | End: 2018-11-06 | Stop reason: SDUPTHER

## 2018-07-24 NOTE — PROGRESS NOTES
"Clinic Note  07/24/2018     Subjective:       Patient ID: Amna Chawla is a 53 y.o. female being seen for an established visit.    Chief Complaint: Hospital Follow Up (nose bleed); Headache; and Back Pain      HPI    Mrs. Chawla is a 54 yo woman with extensive PMHx detailed below who presents to clinic today for follow up after an ED visit on 7/22 for epistaxis. Patient states she was doing her laundry when she felt her nose running. Denies trauma or sneezing. When she realized it was blood, she attempted to pack her nose with a tissue. After 6 hours of attempting to apply pressure, ice packs to her neck, etc. patient presented to the ED. Bleeding resolved with Afrin and Rapid Rhino. Patient reports mild nasal congestion and only small amounts of blood when blowing her nose since going to the ED.       History of V fib arrest, complete heart block, s/p ICD  History of v fib arrest in February 2013. Underwent hypothermia protocol. Post-arrest was found to have intermittent 3rd-degree AV block. On 2/15/2013, a dual chamber ICD was implanted. EF prior to discharge was 60%, negative coronary CTA during that admission. Pharmacologic stress ECHO in 2014 with preserved LVEF and no ischemia. Follows with Dr. Mejia.     Anoxic brain injury/ seizure disorder  Occurred as a result of V fib arrest and hypoplastic posterior circulation. Has mild-moderate persistent cognitive deficits - on donepezil. Follows with Dr. Rock in Neurology. On Topiramate for resulting seizure disorder, lorazepam PRN for "tremor."     History of CVA  Patient with multiple CVA/TIA in history. Most recent 3 weeks ago. No focal neuro deficits today. On plavix, statin, xarelto. Allergic to ASA.     Headaches due to previous head injury  Patient with headaches associated with neck pain, photophobia, phonophobia. Requires botox injections with Dr. Cortez. Has required infusion therapy in the past. On magnesium supplementation, Gabitril, topiramate. " Reports having a headache today. Has Botox injections scheduled with Dr. Cortez in 2 weeks.      A fib  Rate control with Coreg, anticoagulation with Xarelto.    Depression/anxiety  Follows with psychiatry. On Cymbalta, clonazepam. Reports that she takes the clonazepam to calm down for social situations.     HTN  On coreg. Previously on valsartan, changed to losartan due to recall. BP controlled.      Insomnia  Managed per Psych, ambien and hydroxyzine.     GERD  Pantoprazole daily. No current symptoms.      Thyroid nodule  During work up for recent TIA, CT head and neck performed which was remarkable for concerning thyroid nodule. Following with Dr. Quijano. FNA performed 7/13 with benign tissue. Has biopsy of second nodule with IR planned for August.       Past Medical History:   Diagnosis Date    Anxiety     Asthma     Brain anoxic injury     Coronary artery disease     Defibrillator activation 2013    Depression     History of sudden cardiac arrest 2/2013    PEA arrest with subsequent long-QT    Hx of psychiatric care     Hypertension     Left atrial enlargement 4/11/2018    Pacemaker 2013    Psychiatric problem     Seizures     Sleep difficulties     Stroke     weakness lt side    Therapy        Past Surgical History:   Procedure Laterality Date    breast reduction      BREAST SURGERY      CARDIAC DEFIBRILLATOR PLACEMENT      CARDIAC DEFIBRILLATOR PLACEMENT      CARPAL TUNNEL RELEASE Right     COSMETIC SURGERY      HERNIA REPAIR      HIATAL HERNIA REPAIR      INSERT / REPLACE / REMOVE PACEMAKER      TUBAL LIGATION         Family History   Problem Relation Age of Onset    Hypertension Mother     COPD Unknown     Heart failure Unknown     Glaucoma Maternal Grandmother     Glaucoma Paternal Grandmother        Social History     Social History    Marital status: Single     Spouse name: N/A    Number of children: N/A    Years of education: N/A     Occupational History     [employer]  [employer]     Social History Main Topics    Smoking status: Never Smoker    Smokeless tobacco: Never Used    Alcohol use No    Drug use: No    Sexual activity: Not Currently     Other Topics Concern    None     Social History Narrative    None       Review of Systems   Constitutional: Negative for fever.   HENT: Positive for congestion and nosebleeds. Negative for sore throat.    Respiratory: Negative for cough and shortness of breath.    Cardiovascular: Negative for chest pain.   Gastrointestinal: Negative for nausea.   Genitourinary: Negative for dysuria.   Musculoskeletal: Negative for back pain.   Skin: Negative for wound.   Neurological: Positive for headaches.   Psychiatric/Behavioral: Negative for dysphoric mood.       Patient's Medications   New Prescriptions    CETIRIZINE (ZYRTEC) 10 MG TABLET    Take 1 tablet (10 mg total) by mouth once daily.    MELOXICAM (MOBIC) 15 MG TABLET    Take 1 tablet (15 mg total) by mouth once daily.   Previous Medications    ARIPIPRAZOLE (ABILIFY) 5 MG TAB    Take 1 tablet (5 mg total) by mouth every evening.    ATORVASTATIN (LIPITOR) 40 MG TABLET    Take 1 tablet (40 mg total) by mouth every morning.    CARVEDILOL (COREG) 25 MG TABLET    TAKE 1 TABLET(25 MG) BY MOUTH TWICE DAILY WITH MEALS    CLONAZEPAM (KLONOPIN) 1 MG TABLET    Take 1 tablet (1 mg total) by mouth daily as needed for Anxiety.    CLOPIDOGREL (PLAVIX) 75 MG TABLET    Take 1 tablet (75 mg total) by mouth once daily.    DONEPEZIL (ARICEPT) 10 MG TABLET    Take 1 tablet (10 mg total) by mouth 2 (two) times daily.    DULOXETINE (CYMBALTA) 60 MG CAPSULE    Take 1 capsule (60 mg total) by mouth 2 (two) times daily.    GABAPENTIN (NEURONTIN) 300 MG CAPSULE    Take 3 capsules (900 mg total) by mouth 3 (three) times daily.    HYDROXYZINE (ATARAX) 50 MG TABLET    Take 1 tablet (50 mg total) by mouth nightly as needed.    LORAZEPAM (ATIVAN) 1 MG TABLET    Take 1 tablet (1 mg total) by mouth as needed for Anxiety.     LOSARTAN (COZAAR) 50 MG TABLET    Take 1 tablet (50 mg total) by mouth once daily.    MAGNESIUM 200 MG TAB    Take 200 mg by mouth once daily.    PANTOPRAZOLE (PROTONIX) 40 MG TABLET    Take 1 tablet (40 mg total) by mouth once daily.    RIVAROXABAN (XARELTO) 20 MG TAB    Take 1 tablet (20 mg total) by mouth daily with dinner or evening meal.    TIAGABINE (GABITRIL) 4 MG TABLET    Take 1 tablet (4 mg total) by mouth every evening.    TOPIRAMATE (TOPAMAX) 100 MG TABLET    Take 2 tablets (200 mg total) by mouth 2 (two) times daily.    TRIAMCINOLONE ACETONIDE 0.1% (KENALOG) 0.1 % CREAM    AAA bid to rash up to 2 weeks    ZOLPIDEM (AMBIEN CR) 12.5 MG CR TABLET    TAKE 1 TABLET BY MOUTH NIGHTLY AS NEEDED FOR INSOMNIA   Modified Medications    Modified Medication Previous Medication    ONDANSETRON (ZOFRAN-ODT) 4 MG TBDL ondansetron (ZOFRAN-ODT) 4 MG TbDL       Take 1 tablet (4 mg total) by mouth every 12 (twelve) hours as needed (nausea).    Take 1 tablet (4 mg total) by mouth every 24 hours as needed (nausea).   Discontinued Medications    DOXYCYCLINE (MONODOX) 100 MG CAPSULE    Take 1 capsule (100 mg total) by mouth 2 (two) times daily.    LORAZEPAM (ATIVAN) 0.5 MG TABLET    Take 1 tablet (0.5 mg total) by mouth every 6 (six) hours as needed for Anxiety.    MELOXICAM (MOBIC) 7.5 MG TABLET    Take 1 tablet (7.5 mg total) by mouth once daily.    OXYCODONE-ACETAMINOPHEN (PERCOCET) 5-325 MG PER TABLET    Take 1 tablet by mouth every 4 (four) hours as needed for Pain.       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    Thyroid cyst 04/16/2018    TIA (transient ischemic attack) 04/12/2018    Hyponatremia 04/12/2018    Left atrial enlargement 04/11/2018    Depression, major, recurrent, moderate 04/03/2018    Insomnia 04/03/2018    Frequent falls 03/07/2018    History of DVT (deep vein thrombosis) 02/21/2018    Nonischemic  cardiomyopathy from RV pacing 09/27/2017    Dyspnea on exertion 09/12/2017    Unspecified sleep apnea     Bilateral shoulder pain 04/12/2017    Decreased ROM of left shoulder 04/12/2017    Impaired functional mobility, balance, gait, and endurance 04/12/2017    Long term (current) use of anticoagulants 03/10/2017    Paroxysmal atrial fibrillation 03/09/2017    Upper airway resistance syndrome 02/21/2017    Left carpal tunnel syndrome 02/03/2017    Headaches due to old head injury 01/23/2017    Decreased ROM of neck 01/23/2017    Impaired mobility and ADLs 01/16/2017    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 01/16/2017    Right carpal tunnel syndrome 12/09/2016    Right ankle pain 09/16/2016    Palpitations 09/15/2016    Keratoconjunctivitis sicca of both eyes not specified as Sjogren's 06/16/2016    Anxiety 03/21/2016    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    Neck muscle spasm 09/09/2015    Convulsion 05/30/2015    Essential hypertension 05/30/2015    Left-sided headache 05/29/2015    Transient alteration of awareness     History of sudden cardiac arrest 12/10/2014    Biventricular automatic implantable cardioverter defibrillator in situ 12/10/2014    Obesity, Class III, BMI 40-49.9 (morbid obesity) 09/17/2014    Cervicalgia 08/28/2014    Occipital neuralgia 08/28/2014    Chronic migraine without aura, with intractable migraine, so stated, with status migrainosus 08/28/2014    Special screening for malignant neoplasms, colon 06/24/2014    Atypical chest pain 02/13/2014    Hiatal hernia 02/13/2014    GERD (gastroesophageal reflux disease) 02/13/2014    Knee pain 01/15/2014    History of CVA (cerebrovascular accident) 12/03/2013    Paresthesia 11/01/2013    Pacemaker 09/05/2013    HLD (hyperlipidemia) 09/05/2013    CHI (closed head injury) 02/19/2013    Complete AV block 02/19/2013    Cognitive deficits 02/19/2013    Anoxia 02/08/2013    Prolonged Q-T  "interval on ECG 02/08/2013           Objective:      /80   Pulse 70   Ht 5' 4" (1.626 m)   Wt 125 kg (275 lb 9.2 oz)   SpO2 99%   BMI 47.30 kg/m²   Estimated body mass index is 47.3 kg/m² as calculated from the following:    Height as of this encounter: 5' 4" (1.626 m).    Weight as of this encounter: 125 kg (275 lb 9.2 oz).    Physical Exam   Constitutional: She is oriented to person, place, and time. She appears well-developed and well-nourished. No distress.   HENT:   Head: Normocephalic and atraumatic.   Small amount of fresh blood in right nares   Eyes: Conjunctivae are normal. No scleral icterus.   Neck: Normal range of motion.   Cardiovascular: Normal rate, regular rhythm, normal heart sounds and intact distal pulses.    Pulmonary/Chest: Effort normal and breath sounds normal.   Abdominal: Soft. She exhibits no distension. There is no tenderness.   Musculoskeletal: Normal range of motion. She exhibits no edema.   Neurological: She is alert and oriented to person, place, and time.   Skin: Skin is warm and dry.   Psychiatric: She has a normal mood and affect. Her behavior is normal.       Assessment:         1. Epistaxis  cetirizine (ZYRTEC) 10 MG tablet    Ambulatory consult to ENT   2. Chronic migraine without aura, with intractable migraine, so stated, with status migrainosus  ondansetron (ZOFRAN-ODT) 4 MG TbDL   3. Upper airway resistance syndrome  ondansetron (ZOFRAN-ODT) 4 MG TbDL         Plan:         1. Epistaxis  - Given multiple CVA/TIAs, a fib, will not make changes to anticoagulation regimen  - To prevent congestion and minimize nose blowing: cetirizine (ZYRTEC) 10 MG tablet; Take 1 tablet (10 mg total) by mouth once daily.  Dispense: 90 tablet; Refill: 11  - Ambulatory consult to ENT to evaluate for possible cauterization     2. Chronic migraine without aura, with intractable migraine, so stated, with status migrainosus  - ondansetron (ZOFRAN-ODT) 4 MG TbDL; Take 1 tablet (4 mg total) by " mouth every 12 (twelve) hours as needed (nausea).  Dispense: 10 tablet; Refill: 1      Orders Placed This Encounter   Procedures    Ambulatory consult to ENT     Referral Priority:   Routine     Referral Type:   Consultation     Referral Reason:   Specialty Services Required     Requested Specialty:   Otolaryngology     Number of Visits Requested:   1           Patient seen and plan of care discussed with Dr. Junior.      Sam Allred MD  Internal Medicine, PGY-2

## 2018-07-24 NOTE — PATIENT INSTRUCTIONS
Apply Vasoline gently to nose with cotton swab to help the vessel heal.    If your nose starts bleeding again, use Afrin once on bleeding side. Apply pressure for 30 minutes. If it doesn't stop, go to the ER.

## 2018-07-26 ENCOUNTER — HOME CARE VISIT (OUTPATIENT)
Dept: NEUROLOGY | Facility: HOSPITAL | Age: 53
End: 2018-07-26

## 2018-07-26 DIAGNOSIS — G43.711 CHRONIC MIGRAINE WITHOUT AURA, WITH INTRACTABLE MIGRAINE, SO STATED, WITH STATUS MIGRAINOSUS: Primary | ICD-10-CM

## 2018-07-31 ENCOUNTER — EXTERNAL CHRONIC CARE MANAGEMENT (OUTPATIENT)
Dept: PRIMARY CARE CLINIC | Facility: CLINIC | Age: 53
End: 2018-07-31
Payer: MEDICARE

## 2018-07-31 PROCEDURE — 99490 CHRNC CARE MGMT STAFF 1ST 20: CPT | Mod: PBBFAC | Performed by: PSYCHIATRY & NEUROLOGY

## 2018-07-31 PROCEDURE — 99490 CHRNC CARE MGMT STAFF 1ST 20: CPT | Mod: S$PBB,,, | Performed by: PSYCHIATRY & NEUROLOGY

## 2018-08-01 ENCOUNTER — NURSE TRIAGE (OUTPATIENT)
Dept: ADMINISTRATIVE | Facility: CLINIC | Age: 53
End: 2018-08-01

## 2018-08-02 ENCOUNTER — PROCEDURE VISIT (OUTPATIENT)
Dept: NEUROLOGY | Facility: CLINIC | Age: 53
End: 2018-08-02
Payer: MEDICARE

## 2018-08-02 ENCOUNTER — TELEPHONE (OUTPATIENT)
Dept: PHARMACY | Facility: CLINIC | Age: 53
End: 2018-08-02

## 2018-08-02 VITALS
WEIGHT: 276.88 LBS | HEART RATE: 88 BPM | SYSTOLIC BLOOD PRESSURE: 143 MMHG | BODY MASS INDEX: 47.27 KG/M2 | DIASTOLIC BLOOD PRESSURE: 84 MMHG | HEIGHT: 64 IN

## 2018-08-02 DIAGNOSIS — R29.898 DECREASED ROM OF NECK: Chronic | ICD-10-CM

## 2018-08-02 DIAGNOSIS — G43.711 CHRONIC MIGRAINE WITHOUT AURA, WITH INTRACTABLE MIGRAINE, SO STATED, WITH STATUS MIGRAINOSUS: Primary | ICD-10-CM

## 2018-08-02 PROCEDURE — 64615 CHEMODENERV MUSC MIGRAINE: CPT | Mod: S$PBB,,, | Performed by: PSYCHIATRY & NEUROLOGY

## 2018-08-02 PROCEDURE — 64615 CHEMODENERV MUSC MIGRAINE: CPT | Mod: PBBFAC | Performed by: PSYCHIATRY & NEUROLOGY

## 2018-08-02 RX ADMIN — ONABOTULINUMTOXINA 200 UNITS: 100 INJECTION, POWDER, LYOPHILIZED, FOR SOLUTION INTRADERMAL; INTRAMUSCULAR at 11:08

## 2018-08-02 NOTE — PROCEDURES
Follow up:   She gets acceptable relief for 2.5 months after BOTOX     Prior note:   No recurrent stroke symptoms   starting having whole body tremors since this AM. Took 1 tablet of lorazepam   Last night had a HA, took trazodone       Admit Date: 4/11/2018  2:03 PM   Discharge Date and Time: 4/12/2018  8:30 PM   History of Present Illness: Ms. Chawla is a 51 yo F with afib on Eliquis (last dose this am), prior cardiac arrest with associated acute ischemic stroke with residual mild L sided weakness, CAD with ICD in situ, HTN, and HLD who presented with acute R sided weakness and sensation changes.  She originally called EMS for palpitations, but developed acute right sided facial droop and RUE weakness at 1:40pm today, after EMS had arrived.  She denies changes to speech or vision.  SBP en route 200/100.  She is ineligible for tPA 2/2 Eliquis use.  She is not suspected to have an LVO.  She will be admitted to [facility] for observation overnight.     Hospital Course (synopsis of major diagnoses, care, treatment, and services provided during the course of the hospital stay): Ms. Chawla was admitted for acute stroke workup. Pt with pacemaker so unable to obtain MRI Brain; CTA H/N demonstrated no evidence acute infarction, though did exhibit noncalcified plaquing of carotid bifurcations and proximal ICAs with < 50% stenosis, so Plavix was added to pt's daily home regimen. Concerned for TIA at this time though Migraine very high on differential due to pt's hx headaches, including presently this admission. Hypertensive urgency/emergency also considered due to pt's very elevated levels with EMS. Per chart review, Eliquis was a new medication since 4/3/18 (had switched from Coumadin), however staff Faraz concerned that pt had a potential event while on Eliquis. She wished to switch to Pradaxa as an alternative DOAC (as it has an option for reversal), however changed to Xarelto per pt's insurance coverage.   While admitted, pt  given cocktail for HA which she has received previously at the infusion clinic - IV Magnesium, IM Toradol, 2mg IV Morphine, 500cc NS bolus (IV Benadryl not included this time as pt had received a dose earlier that day prior to contrast imaging.) HA improved somewhat and pt was encouraged to follow up with Dr. Cortez for continued management of botox injections, etc. At that time, pt also found with hyponatremia; d/c'd Clorthalidone and plan was made for pt to [facility] 4/16/18 for repeat CMP to evaluate Na level and BP check since discontinuing this medication.  Ms. Chawla was evaluated and treated by PT, OT, and SLP who recommend d/c with outpatient PT and OT. She was discharged home 4/12/18 with [facility] follow-up Monday      Prior note:   2 weeks ago BOTOX effect wore off   Used up all her percocet   3 wks h/o:   Reports frequent falls - falling forward, no LOC, no injuries, able to protect falls by hands   triggers - unknown   Also occ stumbling   Dragging L foot   No Pain in back or legs   No numbness or weakness in legs   No B/B incontinence   No lightheadedness      Prior note:    Flare up of TMJ and HA during daughter's wedding   NSAIDS helped   2 weeks ago BOTOX effect wore off      Prior note:   2 weeks ago BOTOX effect wore off   Has severe spasm and pain in the traps catalina   Weather change has provoked severe migraine + nausea   She started coumadin       Prior note:   3 weeks ago BOTOX effect wore off   She reports severe daily HA    During BOTOX periods she feels she  her HA. Much less intense      Prior note:   The patient is a 51-year-old female who [facility]. Since her last visit, she reports growing frustration   about the fact that nothing has helped her with regards to her headaches. She   continues to experience daily headaches. She takes mefenamic acid and   tizanidine every night, which only provides  "her two hours of relief. She is   unable to sleep because of the refractory headaches. She is incapacitated   because of severe headaches. She reports minimal relief with infusions that she  gets on a periodic basis. She does report an improvement overall with the   Botox. However, this effect has worn off in the last two weeks. She also   reports an episode wherein she fell with loss of consciousness, which was not   provoked. As a result, she injured her ankle and is currently wearing a boot.      Prior note:   since last week - Reports vertigo for 6 - 7 minutes upto 3 times daily   Nearly daily severe HA - no response to ponstel , compazine   She is late for her BOTOX - last 2 weeks were painful       Follow up:   R sided Headache is constant max at TMJ on R   Prior note:   She reports new episodes of R face tingling. Sh also reports 2 episodes of blurred vision lasting 15 minutes. These hapended while watching TV. Her pain remains unchanged   Follow up:   2 days of severe HA during Thanksgiving   Pounding bifrontal region   Pain worse over L eye brow   Follow up:   Reports bifrontal severe HA (worse on L) with no nausea with sudden onset . Occurs daily   NO note:  I found no papilledema or other changes to suggest elevated intracranial pressure or other alternative etiology for her headaches. Repeat exam in two years.  HA are less frequent and less intense.   (R levator scapula spasm)   symptoms better with lateral flexion to L   Prior note:   She still has daily HA with severe sharp stabbing pain behind L eye lasting for 15 sec   Prior note:   Daily pain. 2/week - nausea.  Shu Lares RN at 9/17/2014 4:53 PM  Pt presented to clinic for medical advice. Pt is seen by Dr. Paredes and Dr. Cortez.  1) She went ER on 9/13/14 for a migraine. She was given Reglan, Benadryl, MSO4 and IVF. A couple days later she developed an all over body "tremor". She states "I think I have Parkinson's". - Per Dr. Paredes, " "medication related tremor (Reglan & Benadryl). Informed her it should wear off in a few days. If not, she is to call and let us know.  2) Headaches - triggered by insomnia.   Current meds:  Ketoprofen - she took it once and said her "throat closed up" and she's not supposed to have anything with aspirin in it.  Nortriptyline - she was taking it at night, but it didn't help her sleep, so she stopped it.  Per Dr. Cortez, discontinue Ketoprofen and Nortriptyline. Start Effexor XR 37.5mg QD, tizanidine 4mg BID PRN headache and Klonopin 0.25 - 0.5mg QHS PRN. Will schedule pt for sphenocath procedure within the next week.   Prior note:   46 yo woman with history of HTN and asthma, both reportedly controlled, in hospital early 2013 after "passed out" and became unresponsive. CPR in the field for 8 minutes until EMS arrived. Per ED note, on arrival she was found to be in PEA, given epinephrine. Vfib/Vtach was achieved which was shocked x1. Patient converted to sinus tachycardia after shock. I do not know the time course for the above treatment. On arrival to facility patient was in sinus tachycardia with strong pulses, intubated and unresponsive. ET tube placement was confirmed with direct laryngoscopy and good bilateral breath sounds were heard after the tube was pulled back 2 cm. Reported to have "withdrawal" movements in ER during stabilization, then sedated and cooling protocol initiated. Had remarkable   recovery and first seen in clinic in April 2013.   Headache history: cluster   Age of onset - 2012   Location - behind L eye   Nature of pain - sharp   Prodrome - no   Aura - No   Duration of headache - 6-7 min   Time to peak intensity -   Pain scale - range of intensity - 9/10   Frequency - daily   Status Migrainosus history - 1-3 days   Time of day of most headaches- anytime   Associated symptoms with the headache:   Red eyes   swelling of L face   Headache history: Migraine   Age of onset - 2012   Location - bifrontal " "  Nature of pain - Pounding   Prodrome - no   Aura - No   Duration of headache - > 4 hrs   Time to peak intensity -   Pain scale - range of intensity - 9/10   Frequency - 5/week   Status Migrainosus history - 1-3 days   Time of day of most headaches- anytime   Associated symptoms with the headache:   Meningeal symptoms - photophobia, phonophobia, exercise intolerance +   Nausea/vomitting +   Nasal drainage   Visual blurriness +   Pallor/flushing   Dizziness   Vertigo   Confusion   Impaired concentration +   Pain worsened with physical activity +   Neck pain +   Symptoms of increased intracranial pressure:   Whooshing sounds - no   Visual spots/blotches - no   Headache Triggers: unknown   Other comorbid conditions:   Anxiety - no   Motion sickness symptom - no       Treatment history:   Resolution of headache with sleep - yes       Meds tried:   Trigger points involvin/9/15 helped for 1 day   Site: Right   Levator scapula   Pharmacological agent:   0.5% Sensorcaine solution   No complications were noted.  Supraorbital block - helped for 2 days   Tylenol - not help   imitrex - chest tightness   alleve - help   topamax - not helping   Neurontin - not helping   Paxil, Elavil - not help   seroquel - nightmare   Ketoprofen - she took it once and said her "throat closed up" and she's not supposed to have anything with aspirin in it.  Nortriptyline - she was taking it at night, but it didn't help her sleep, so she stopped it.  reglan - parkinsonism   zanaflex - help   Toradol 30 mg IM inj at home - too expensive   BOTOX round #1 9/3/15 - helped (R levator scapula spasm)   Round #2 12/3/15 - helped   Round#3 3/316 - helped   Round #4 16 - helped   BOTOX#5 9/15/16 - helped     BOTOX#6 - 16 - helped   BOTOX#7 - 3/9/17 - helped    BOTOX#8 - 17 - helped    BOTOX#9 8/10/17 - helped   BOTOX#10 10/19/17 - helped   BOTOX#11 17 - helped   BOTOX#12 3/7/18 - helped   BOTOX#13 18 - helped      Can not do " RFA - pt has pacemaker   Procedure: IOVERA peripheral nerve focus cold therapy (non ablative cryotherapy) 12/30/15 - not help   Indication: Cryotherapy of select peripheral nerves of the head was performed to treat chronic headaches that failed to respond to several preventive pharmacological therapies.   Sedation: None   Informed consent: The procedure, risks, benefits and options were discussed with the patient. There are no contraindications to the procedure. The patient expressed understanding and agreed to the procedure. I verify that I personally obtained the patient's consent prior to the start of the procedure.  Location:  Bilateral Supra orbital nerve (3 cycles on R and 1 cycle on L)   Bilateral Occipital nerve   3 cycles   Pain relief: Pain score reduced 10/10->0/10   9/26 Bilateral Sphenopalatine Ganglion and bilateral Maxillary Branch (Trigeminal Nerve) Block - no help   ONB - not help                                            Shannon Pagan RN at 12/31/2014 3:54 PM       Status: Signed                   Expand All Collapse All   HEADACHE FASTTRACK  PAIN 7/10 NAUSEA 0/10  ROUND 1: TORADOL, IMITREX, MORPHINE, BENADRYL, AND MAGNESIUM  PAIN 7/10 NAUSEA 0/10  PT STATED SHE WAS READY TO GO HOME AND GO TO SLEEP. PT D/C'ED IN NAD            Shannon Pagan RN at 10/5/2015 12:49 PM       Status: Signed                   Expand All Collapse All   HEADACHE FASTTRACK  PAIN 8/10 NAUSEA 10/10  FIRST ROUND: tORADOL, BENADRYL, COMPAZINE, IMITREX, MAGNESIUM, AND VALPROATE.  PAIN 1/10 NAUSEA 0/10  PT READY TO GO HOME AND FEELING BETTER. PT LEFT IN NAD WITH FAMILY MEMBER  TOTAL TIME: 7574-9963       Shannon Pagan RN at 10/20/2015 3:05 PM       Status: Signed                   Expand All Collapse All   HEADACHE FASTTRACK  PAIN 10/10 NAUSEA 0/10  FIRST ROUND: tORADOL, BENADRYL, COMPAZINE, IMITREX, MAGNESIUM, AND VALPROATE.  BP BEING MONITORED EVERY 15 MIN AND REMAINED 170'S/80-90'S. DR CHOPRA NOTIFIED AND STATED TO MAKE SURE BP  DID NOT EXCEED 180 SYSTOLIC OR PT SHOULD REPORT TO ED. BP AT 1500 183/92. DR CHOPRA NOTIFIED AND GAVE ORDER TO STOP INFUSION AND SEND PATIENT TO ED. PT BROUGHT TO ED BY INFUSION NURSE VIA WHEELCHAIR.   PAIN 8/10 NAUSEA 0/10  TOTAL TIME: 9233-2158               Progress Notes                           Shannon Pagan RN at 2/24/2016 1:36 PM       Status: Signed                  Expand All Collapse All   HEADACHE FASTTRACK  PAIN 10/10 NAUSEA 7/10  FIRST ROUND: tORADOL, BENADRYL, AND MORPHINE-BP RANGING FROM 177-203/84-92, HR 60-82  MD NOTIFIED AND GAVE ORDERS TO SEND TO ED. PT LEFT VIA W/C WITH INFUSION NURSE ON WAY TO ED.            Headache risk factors:   H/o TBI - [facility] (2013) + neck sprain   H/o Meningitis - no   F/h Aneurysms - no       Review of Systems   Constitutional: Negative.   HENT: Negative.   Eyes: Negative.   Respiratory: Negative.   Cardiovascular: Negative.   Gastrointestinal: Negative.   Endocrine: Negative.   Genitourinary: Negative.   Musculoskeletal: Negative.   Skin: Negative.   Allergic/Immunologic: Negative.   Hematological: Negative.   Psychiatric/Behavioral: insomnia      Objective:     Physical Exam   Constitutional: She is oriented to person, place, and time. She appears well-developed.   obesity   HENT:   Head: Normocephalic and atraumatic.   Right Ear: External ear normal.   Left Ear: External ear normal.   Nose: Nose normal.   Mouth/Throat: Oropharynx is clear and moist.   Eyes: Conjunctivae and EOM are normal. Pupils are equal, round, and reactive to light.   Neck: Normal range of motion.   Cardiovascular: Regular rhythm.   Pulmonary/Chest: Effort normal and breath sounds normal.   Musculoskeletal: Normal range of motion.   Neurological: She is alert and oriented to person, place, and time. She has normal reflexes. She displays normal reflexes. No cranial nerve deficit. She exhibits normal muscle tone. Coordination normal. Uses cane.   Ataxic gait - wide based   Skin: Skin is warm and dry.    Psychiatric: She has a normal mood and flat affect. Her behavior is normal. Judgment and thought content normal.   Headache Exam:  Optic disc is flat OU   Temples appear symmetric  No V1,V2,V3 distribution allodynia/hyperalgesia catalina   No facial swelling  No gross TMJ abnormalities   No ptosis   No conj injection   No meningismus   No neck stiffness  No C spine tenderness  Cervical facet tenderness on palpation catalina   No tender points over trapezium   catalina supraorbital nerve exit point tenderness  catalina occipital nerve exit point tenderness  No auriculotemporal nerve tenderness   Tenderness of levator scapula catalina      Gait - wide based gait   Tremor in hands, legs, voice and neck              Assessment:     1.  Occipital neuralgia     2.  Cervicalgia     3.  4  5  6 Chronic migraine without aura, with intractable migraine, so stated, with status migrainosus (post traumatic) post concussive?  Depression   TMJ - R sided   Insomnia - SHIRA      Head CT  Findings: There is no evidence of acute or recent major vascular territory cerebral infarction, parenchymal hemorrhage, or intra-axial mass.  There are no extra-axial masses or abnormal fluid collections.  The ventricular system is within normal limits of size for age and shows no distortion by mass-effect or evidence of hydrocephalus.  There is no fracture or destructive osseous lesion.  The extracranial soft tissues are unremarkable.  The visualized paranasal sinuses and mastoid air cells are clear.   Impression    No acute intracranial abnormality.  ______________________________________     Electronically signed by resident: KRISSY MAYERS MD  Date: 03/14/16  Time: 17:09            Results for PUSHPA KEY (MRN [id number]) as of 9/3/2015 14:26     Ref. Range  7/20/2015 11:06  8/19/2015 14:15    Vitamin B-12  Latest Range: 210-950 pg/mL  383       Retinol, serum  Latest Range:  ug/dL     52    Vitamin B2   Latest Range: 1-19 mcg/L      2     Vitamin B6   Latest  Range: 5-50 ug/L      4 (L)     Vit D, 25-Hydroxy   Latest Range: 30-96 ng/mL   13 (L)           Plan:     1. Prophylactic medication -   Despiramine 25 mg AM (for dizziness)   Cymbalta 120 mg daily   Aricept 20 mg daily   Neurontin 900 mg TID    Magnesium 200 mg daily  Zanaflex 8 mg PRN   Topamax 200 mg BID   Cont B2, B6 supplements   2, Breakthrough headache - zanaflex 8 mg, Gabitril 8 mg  PRN percocet Q=30  Multiple alternative treatment options were offered to the patient   3. Refrain from over counter pain medications. Discussed medication overuse headache.cont Magnesium 400 mg PO BID   4. Occipital neuralgia - If medical management is ineffective may consider occipital nerve blocks.   5. I again urged the patient to keep a headache calender.    f/up Dr. Rock for TBI    B12 injection - 5/25/17   f/up sleep clinic     Start AIMOVIG     BOTOX was performed as an indicated therapy for intractable chronic migraine headaches given that the patient failed > 5 prophylactic medications  Botulinum Toxin Injection Procedure   Pre-operative diagnosis: Chronic migraine   Post-operative diagnosis: Same   Procedure: Chemical neurolysis   After risks and benefits were explained including bleeding, infection, worsening of pain, damage to the areas being injected, weakness of muscles, loss of muscle control, dysphagia if injecting the head or neck, facial droop if injecting the facial area, painful injection, allergic or other reaction to the medications being injected, and the failure of the procedure to help the problem, a signed consent was obtained.   The patient was placed in a comfortable area and the sites to be treated were identified.The area to be treated was prepped three times with alcohol and the alcohol allowed to dry. Next, a 30 gauge needle was used to inject the medication in the area to be treated.   Area(s) injected:   Total Botox used: 155 Units   Botox wastage: 45 Units   Injection sites:     muscle bilaterally ( a total of 10 units divided into 2 sites)   Procerus muscle (5 units)   Frontalis muscle bilaterally (a total of 20 units divided into 4 sites)   Temporalis muscle bilaterally (a total of 40 units divided into 8 sites)   Occipitalis muscle bilaterally (a total of 30 units divided into 6 sites)   Cervical paraspinal muscles (a total of 20 units divided into 4 sites)   Trapezius muscle bilaterally (a total of 30 units divided into 6 sites)    Complications: none       RTC for the next Botox injection: 10 weeks

## 2018-08-02 NOTE — TELEPHONE ENCOUNTER
Reason for Disposition   Caller has already spoken with the PCP and has no further questions.    Protocols used: ST NO CONTACT OR DUPLICATE CONTACT CALL-A-RONALDO Woo states she has already spoken to on call physician regarding her nose bleed, which did last about an hour tonight.  He gave instructions for pressure, and gauze packing, and told her to go to the ED if it did not stop bleeding within 30 minutes, as she is on Xarelto and Plavix.  She states it is stopped, but states understanding that if it resumes, she will go to ED for evaluation.  She says she did that about a week ago.  Encouraged follow up with her pcp, Sam Allred , as soon as possible.  Message to Dr Allred. Please contact caller directly with any additional care advice.

## 2018-08-03 ENCOUNTER — TELEPHONE (OUTPATIENT)
Dept: NEUROLOGY | Facility: CLINIC | Age: 53
End: 2018-08-03

## 2018-08-03 NOTE — TELEPHONE ENCOUNTER
----- Message from Laina Nair RN sent at 8/3/2018  9:25 AM CDT -----  Please schedule Patient to see Dr Rock when he has any availability.She is followed by Dr Cortez and he states she has a TBI.  Thank you!  Laina Nair RN

## 2018-08-03 NOTE — TELEPHONE ENCOUNTER
DOCUMENTATION ONLY:  Prior Authorization for Aimovig approved from 07/04/18 to 08/03/19    Case Id: [id number]    Co-pay: $8.35    Patient Assistance IS NOT required.    Forwarded to the clinical pharmacist for consult and shipment.    -ARR

## 2018-08-06 ENCOUNTER — TELEPHONE (OUTPATIENT)
Dept: PHARMACY | Facility: CLINIC | Age: 53
End: 2018-08-06

## 2018-08-06 NOTE — TELEPHONE ENCOUNTER
Initial Aimovig consult completed on 18. Aimovig 70mg will be picked up on 18 by Patient's daughter, Silverio Dey. $8.35 copay. Patient intends to start Aimovig on 18. Address confirmed. Confirmed 2 patient identifiers - name and . Therapy Appropriate.    Counseled patient on administration directions:  - Inject 70mg into the skin every 30 days.   - Take out of the refrigerator 30-60 minutes prior to injection.    Injection technique will be reviewed with daughter at pickup and will include:  - Patient may inject in either the tops of the thighs, abdomen- but at least 2 inches away from belly button, or the outer part of her upper arm (with assistance). If injecting 2 pens, use 2 different injection sites. Patient was instructed to rotate injections sites.  - Patient is to wipe down the injection site with the alcohol pad, wait to dry.    - Remove white cap from pen  - Gently squeeze OR stretch the area of the cleaned skin and hold it firmly.  Place the pen flat against the skin then push down on the purple button and release - there will be an initial click; in 10-15 seconds you will hear a second click and the window will go from from clear to yellow, indicating injection is complete.  - Patient should rotate injection sites.   - Patient will use sharps container; once full, per TAHIR jung, she/ he may lock the sharps container and place in her trash. She/ he can then contact the Pharmacy and we will replace the sharps at no additional charge.    Patient was counseled on possible side effects:  - Injection site reaction: redness, soreness, itching, bruising, which should resolve within 3-5 days.  - Constipation    Patient did not have any further questions. Advised to keep a calendar to stay compliant. Consultation included: indication; goals of treatment; administration; storage and handling; side effects; how to handle side effects; the importance of compliance; how to handle missed doses; the  importance of laboratory monitoring; the importance of keeping all follow up appointments.  Patient understands to report any medication changes to [facility] and provider. All questions answered and addressed to patients satisfaction. I will f/u with patient in 7-10 days from start, [facility] to contact patient in 3 weeks for refills.     Tonia Bacon, PharmD  Specialty Pharmacy Clinical Pharmacist  [facility]  P: [phone]

## 2018-08-07 ENCOUNTER — OFFICE VISIT (OUTPATIENT)
Dept: NEUROLOGY | Facility: CLINIC | Age: 53
End: 2018-08-07
Attending: PSYCHIATRY & NEUROLOGY
Payer: MEDICARE

## 2018-08-07 ENCOUNTER — PATIENT MESSAGE (OUTPATIENT)
Dept: SLEEP MEDICINE | Facility: CLINIC | Age: 53
End: 2018-08-07

## 2018-08-07 VITALS
DIASTOLIC BLOOD PRESSURE: 79 MMHG | SYSTOLIC BLOOD PRESSURE: 163 MMHG | HEART RATE: 65 BPM | HEIGHT: 64 IN | WEIGHT: 280.44 LBS | BODY MASS INDEX: 47.88 KG/M2

## 2018-08-07 DIAGNOSIS — Z74.09 IMPAIRED MOBILITY AND ADLS: Chronic | ICD-10-CM

## 2018-08-07 DIAGNOSIS — G47.30 SLEEP APNEA, UNSPECIFIED TYPE: Primary | ICD-10-CM

## 2018-08-07 DIAGNOSIS — Z86.718 HISTORY OF DVT (DEEP VEIN THROMBOSIS): ICD-10-CM

## 2018-08-07 DIAGNOSIS — S09.90XS HEADACHES DUE TO OLD HEAD INJURY: Chronic | ICD-10-CM

## 2018-08-07 DIAGNOSIS — Z78.9 IMPAIRED MOBILITY AND ADLS: Chronic | ICD-10-CM

## 2018-08-07 DIAGNOSIS — E78.2 MIXED HYPERLIPIDEMIA: ICD-10-CM

## 2018-08-07 DIAGNOSIS — Z86.73 HISTORY OF CVA (CEREBROVASCULAR ACCIDENT): ICD-10-CM

## 2018-08-07 DIAGNOSIS — G44.309 HEADACHES DUE TO OLD HEAD INJURY: Chronic | ICD-10-CM

## 2018-08-07 DIAGNOSIS — Z00.00 PREVENTATIVE HEALTH CARE: ICD-10-CM

## 2018-08-07 DIAGNOSIS — Z12.39 SCREENING FOR BREAST CANCER: Primary | ICD-10-CM

## 2018-08-07 DIAGNOSIS — I10 ESSENTIAL HYPERTENSION: ICD-10-CM

## 2018-08-07 PROCEDURE — 99999 PR PBB SHADOW E&M-EST. PATIENT-LVL IV: CPT | Mod: PBBFAC,,, | Performed by: PSYCHIATRY & NEUROLOGY

## 2018-08-07 PROCEDURE — 99215 OFFICE O/P EST HI 40 MIN: CPT | Mod: S$PBB,,, | Performed by: PSYCHIATRY & NEUROLOGY

## 2018-08-07 PROCEDURE — 99214 OFFICE O/P EST MOD 30 MIN: CPT | Mod: PBBFAC | Performed by: PSYCHIATRY & NEUROLOGY

## 2018-08-07 NOTE — PATIENT INSTRUCTIONS
-Brain Health lifestyle modifications:    -sleep hygiene   - diet: Mediterranean Diet    -exercise: 20-30 minutes of  Moderate exercise 3-5 times a week   -stress management reviewed   -smoking cessation, alcohol moderation    Check out my blog post for further information:  [email or web address]      Sleep Hygiene:    1. Avoid watching TV, eating, and discussing emotional issues in bed. The bed should be used for sleep and sex only. If not, we can associate the bed with other activities and it often becomes difficult to fall asleep.  2. Minimize noise, light, and temperature extremes during sleep with ear plugs, window blinds, or an electric blanket or air conditioner. Even the slightest nighttime noises or luminescent lights can disrupt the quality of your sleep. Try to keep your bedroom at a comfortable temperature -- not too hot (above 75 degrees) or too cold (below 54 degrees).  3. Try not to drink fluids after 8 p.m. This may reduce awakenings due to urination.  4. Avoid naps, but if you do nap, make it no more than about 25 minutes about eight hours after you awake. But if you have problems falling asleep, then no naps for you.  5. Do not expose your self to bright light if you need to get up at night. Use a small night-light instead.  6. Nicotine is a stimulant and should be avoided particularly near bedtime and upon night awakenings. Having a smoke before bed, although it may feel relaxing, is actually putting a stimulant into your bloodstream.  7. Caffeine is also a stimulant and is present in coffee (100-200 mg), soda (50-75 mg), tea (50-75 mg), and various over-the-counter medications. Caffeine should be discontinued at least four to six hours before bedtime. If you consume large amounts of caffeine and you cut your self off too quickly, beware; you may get headaches that could keep you awake.  8. Although alcohol is a depressant and may help you  fall asleep, the subsequent metabolism that clears it from your body when you are sleeping causes a withdrawal syndrome. This withdrawal causes awakenings and is often associated with nightmares and sweats.  9. A light snack may be sleep-inducing, but a heavy meal too close to bedtime interferes with sleep. Stay away from protein and stick to carbohydrates or dairy products. Milk contains the amino acid L-tryptophan, which has been shown in research to help people go to sleep. So milk and cookies or crackers (without chocolate) may be useful and taste good as well.

## 2018-08-07 NOTE — PROGRESS NOTES
Subjective:       Patient ID: Amna Chawla is a 53 y.o. female.    Chief Complaint:  No chief complaint on file.      History of Present Illness    52 yo AAF with HIE w/ CVA (2013), Asthma, CAD, depression, HTN, PM, seizures, CVA who presents for concussion evaluation.  Pt was last seen on 6/2017 and at that time we recommended:  -conservative measures: cognitive rest, avoid further TBIs, abstain from EtOH  -sleep/wake cycle:   -sleep hygiene   -stop trazodone    -f/u sleep clinic referral for CPAP  -cognitive(memory losses):  SLP,   -ADLs: OT  -cervical ROM: PT  -headaches: per Dr. Cortez  -recommend patient move to assisted living with support of daughter, letter provided  -BIALA and ABIS referral    In the interim, pt has been stable  Seen by Dr. Cortez for botox for migraines which is helpful.  She is about to start CGRP antagonist  Evaluated by Dr. Paredes in memory disorders.  Started on aricept  Sleep study pending.  She currently lives with her daughter and grandson.  She is independent in ADLs.    She endorses stumbling, but no falls.  She does not drive.  She has had recent epitaxis and currently         Pt was last seen on 3/22/17 and at that time we recommended:  -conservative measures: cognitive rest, avoid further TBIs, abstain from EtOH  -sleep/wake cycle:   -sleep hygiene   -trazodone 200mg QHS PRN, educated about serotonin syndrome   -sleep clinic referral UARS and CPAP  -cognitive(memory losses):  SLP,   -ADLs: OT  -headaches: per Dr. Cortez   -LEON epstein completed   -PT cervical ROM  -recommend patient move to assisted living with support of daughter, letter provided  -BIALA and ABIS referral      In the interim, she has seen Dr. Cortez for her headaches and underwent botox. Botox has been helpful.   She was seen by sleep clinic and is pending tx PSG with CPAP.  Not using trazodone.    She is pending placement to an apartment with supportive.   She has completed PT/OT.       Pt was last seen on 2/21/17  "and at that time we recommended:  -conservative measures: cognitive rest, avoid further TBIs, abstain from EtOH  -sleep/wake cycle:   -sleep hygiene   -trazodone 100mg QHS PRN, educated about serotonin syndrome   -sleep clinic referral UARS and CPAP  -cognitive(memory losses):  SLP, repeat neuropsych test  -ADLs: OT  -headaches: per Dr. Cortez   -PT cervical ROM  -BIALA and ABIS referral    In the interim, pt had another mTBI.  She struck her head on a window and developed persistent headaches.    She reports that she just was not paying attention.  No falls, seizures, syncopal episodes.  Given AC and persistent headache she was advised to go to the ED.  CTH did not reveal any new ICH.  Currently she has a headache which is on your left tempor-occipital.  She rates it as 7/10.   She says it is improving and responds to acetaminophen.   She is endorses good sleep. She takes trazodone 200mg as needed (~3 times a week).  She is pending sleep clinic appointment for UARS and CPAP assessment.  She denies any cognitive issues from baseline.          Pt was last seen on 1/4/17 and at that time we recommended:    -conservative measures: cognitive rest, avoid further TBIs, abstain from EtOH  -MRI Brain (contra-indicated due to PM)  -sleep/wake cycle:   -sleep hygiene   -melatonin 0.1-0.3mg at night  -cognitive(memory losses):  SLP, repeat neuropsych test  -ADLs: OT  -headaches: per Dr. Cortez   -PT cervical ROM  -BIALA and ABIS referral    In the interim, pt continues to have headaches.  She has started SLP and PT.  NP and OT testing is pending.  Pt is not sleeping well.  She tried melatonin in the past and developed 'shakes'.  She has tried trazodone with no effect either positive or adverse.  She was evaluated by Sleep CLinic in 2014 and found to have UARS and insurance did not cover CPAP.       Initial HPI(1/4/17)  Per previous records, patient was in the hospital early 2013 after "passed out" and became unresponsive. CPR in " "the field for 8 minutes until EMS arrived. Per ED note, on arrival she was found to be in PEA, given epinephrine. Vfib/Vtach was achieved which was shocked x1. Patient converted to sinus tachycardia after shock. I do not know the time course for the above treatment. On arrival to facility patient was in sinus tachycardia with strong pulses, intubated and unresponsive. ET tube placement was confirmed with direct laryngoscopy and good bilateral breath sounds were heard after the tube was pulled back 2 cm. Reported to have "withdrawal" movements in ER during stabilization, then sedated and cooling protocol initiated. Had remarkable recovery and first seen in clinic in April 2013.  During this episode patient fell and hit her head.  NO bleed was noted at that time.    She has extensive headaches.  She is on numerous PPX medications and also receives botox and infusion therapy.    Her headaches are associated with neck pain.  She has received trigger point injections which have helped.  She has associated nausea, dizziness.    She endorses poor sleep.  She is currently using tizanadine.  She endorses cognitive issues and is currently donepezil, which helps somewhat.  Her mood is labile, and currently is good.  She denies SI/HI.  She has episodes that she calls seizures.  She says she is aware of shaking episodes (2-3 since the cardiac arrest).    She denies any changes in vision, previous head/neck trauma, appetite/smell changes.  She is no longer having her cycle.  SHe is not involved in litigation.   She lives with her daughters.   SHe is independent in ADLs, but requires assistance iADLs.  She acknowledges difficulties with house-hold chores, e.g. Cooking, dish washing, etc.            Past Medical History:   Diagnosis Date    Anxiety     Asthma     Brain anoxic injury     Coronary artery disease     Defibrillator activation 2013    Depression     History of sudden cardiac arrest 2/2013    PEA arrest with " subsequent long-QT    Hx of psychiatric care     Hypertension     Left atrial enlargement 4/11/2018    Pacemaker 2013    Psychiatric problem     Seizures     Sleep difficulties     Stroke     weakness lt side    Therapy        Past Surgical History:   Procedure Laterality Date    breast reduction      BREAST SURGERY      CARDIAC DEFIBRILLATOR PLACEMENT      CARDIAC DEFIBRILLATOR PLACEMENT      CARPAL TUNNEL RELEASE Right     COSMETIC SURGERY      HERNIA REPAIR      HIATAL HERNIA REPAIR      INSERT / REPLACE / REMOVE PACEMAKER      TUBAL LIGATION         Family History   Problem Relation Age of Onset    Hypertension Mother     COPD Unknown     Heart failure Unknown     Glaucoma Maternal Grandmother     Glaucoma Paternal Grandmother        Social History     Social History    Marital status: Single     Spouse name: N/A    Number of children: N/A    Years of education: N/A     Occupational History     [email or web address]     Social History Main Topics    Smoking status: Never Smoker    Smokeless tobacco: Never Used    Alcohol use No    Drug use: No    Sexual activity: Not Currently     Other Topics Concern    Not on file     Social History Narrative    No narrative on file     2/21/17            Current Outpatient Prescriptions:     ARIPiprazole (ABILIFY) 5 MG Tab, Take 1 tablet (5 mg total) by mouth every evening., Disp: 30 tablet, Rfl: 5    atorvastatin (LIPITOR) 40 MG tablet, Take 1 tablet (40 mg total) by mouth every morning., Disp: 90 tablet, Rfl: 3    carvedilol (COREG) 25 MG tablet, TAKE 1 TABLET(25 MG) BY MOUTH TWICE DAILY WITH MEALS, Disp: 180 tablet, Rfl: 1    cetirizine (ZYRTEC) 10 MG tablet, Take 1 tablet (10 mg total) by mouth once daily., Disp: 90 tablet, Rfl: 11    clonazePAM (KLONOPIN) 1 MG tablet, Take 1 tablet (1 mg total) by mouth daily as needed for Anxiety., Disp: 30 tablet, Rfl: 5    clopidogrel (PLAVIX) 75 mg tablet, Take 1 tablet (75 mg total) by mouth  once daily., Disp: 90 tablet, Rfl: 2    donepezil (ARICEPT) 10 MG tablet, Take 1 tablet (10 mg total) by mouth 2 (two) times daily., Disp: 180 tablet, Rfl: 3    DULoxetine (CYMBALTA) 60 MG capsule, Take 1 capsule (60 mg total) by mouth 2 (two) times daily., Disp: 60 capsule, Rfl: 5    erenumab-aooe 70 mg/mL AtIn, Inject 70 mg into the skin every 30 days., Disp: 1 mL, Rfl: 6    gabapentin (NEURONTIN) 300 MG capsule, Take 3 capsules (900 mg total) by mouth 3 (three) times daily., Disp: 810 capsule, Rfl: 12    hydrOXYzine (ATARAX) 50 MG tablet, Take 1 tablet (50 mg total) by mouth nightly as needed., Disp: 30 tablet, Rfl: 5    LORazepam (ATIVAN) 1 MG tablet, Take 1 tablet (1 mg total) by mouth as needed for Anxiety., Disp: 1 tablet, Rfl: 0    losartan (COZAAR) 50 MG tablet, Take 1 tablet (50 mg total) by mouth once daily., Disp: 90 tablet, Rfl: 3    magnesium 200 mg Tab, Take 200 mg by mouth once daily. (Patient taking differently: Take 200 mg by mouth every other day. ), Disp: 30 each, Rfl: 12    meloxicam (MOBIC) 15 MG tablet, Take 1 tablet (15 mg total) by mouth once daily., Disp: 30 tablet, Rfl: 3    ondansetron (ZOFRAN-ODT) 4 MG TbDL, Take 1 tablet (4 mg total) by mouth every 12 (twelve) hours as needed (nausea)., Disp: 10 tablet, Rfl: 1    pantoprazole (PROTONIX) 40 MG tablet, Take 1 tablet (40 mg total) by mouth once daily., Disp: 30 tablet, Rfl: 11    rivaroxaban (XARELTO) 20 mg Tab, Take 1 tablet (20 mg total) by mouth daily with dinner or evening meal., Disp: 30 tablet, Rfl: 11    tiaGABine (GABITRIL) 4 MG tablet, Take 1 tablet (4 mg total) by mouth every evening., Disp: 30 tablet, Rfl: 12    triamcinolone acetonide 0.1% (KENALOG) 0.1 % cream, AAA bid to rash up to 2 weeks, Disp: 60 g, Rfl: 1    zolpidem (AMBIEN CR) 12.5 MG CR tablet, TAKE 1 TABLET BY MOUTH NIGHTLY AS NEEDED FOR INSOMNIA, Disp: 30 tablet, Rfl: 0    topiramate (TOPAMAX) 100 MG tablet, Take 2 tablets (200 mg total) by mouth 2  (two) times daily., Disp: 120 tablet, Rfl: 12    Current Facility-Administered Medications:     ketorolac injection 60 mg, 60 mg, Intramuscular, 1 time in Clinic/HOD, David Cortez MD    onabotulinumtoxina injection 200 Units, 200 Units, Intramuscular, Q90 Days, David Cortez MD, 200 Units at 05/16/18 1107    onabotulinumtoxina injection 200 Units, 200 Units, Intramuscular, Q90 Days, David Cortez MD, 200 Units at 08/02/18 1112    Review of Systems  Review of Systems   Constitutional: Negative for chills and fever.   Gastrointestinal: Positive for nausea and vomiting.   Neurological: Positive for dizziness, focal weakness, seizures, loss of consciousness and headaches. Negative for tingling, tremors, sensory change and speech change.   Psychiatric/Behavioral: Positive for depression and memory loss. Negative for hallucinations, substance abuse and suicidal ideas. The patient has insomnia. The patient is not nervous/anxious.        1/4/16          Objective:     Vitals:    08/07/18 0833   BP: (!) 163/79   Pulse: 65      Physical Exam      Constitutional: obese AA female  HENT:   Head: Normocephalic and atraumatic.   Neck and spine: Normal range of motion. Neck supple. No TTP in cervical, thoracic, and lumbar spine  Cardiovascular: Normal rate, regular rhythm and normal heart sounds.    Pulmonary/Chest: Effort normal and breath sounds normal.   Abdominal: Soft. Bowel sounds are normal.   Skin: Skin is warm.   Ext: No c/c/e noted    The patient is awake, attentive, Alert, oriented to person, place and time.  Language is intact to comprehension, repetition, and production  Able to follow multi-step commands  No findings to suggest executive dysfuntion    Patient has adequate insight    Mood is stable      Pursuits were smooth, normal saccades, no nystagmus bilaterally  PERRL, VFFC, EOMI, sensation is diminished to LT L    Motor - facial movement was symmetrical and normal, no facial droop seen.   hearing grossly  intact  Palate moved well and was symmetrical with normal palatal and oral sensation  Tongue protrudes midline and movements were full      Normal tone.  No spasticity, cogwheel rigidity  Normal bulk. No pronator drift                        Right      Left  Deltoid            5          5  Biceps            5          5  Triceps           5          5  BR                  5          5                   5          5    Hip Flex            5          5  Hip Ext             5          5  Leg ABduc       5          5  Leg ADduc       5          5  Knee Flex         5          5  Knee Ext          5          5  Plantar Flexion  5          5  Dorsiflexion       5          5      No tremor noted    Reflexes normal and symmteric in bl upper and lower extremities, including biceps, triceps, and patellar    Sensory:  Light touch: diminished on L  Pinprick sensation:  diminished on L    Coordination:  F-to-N:  intact    H-to-S:  intact   Rapid-alt movements:  Normal amplitude and frequency     Romberg Sign:  negative  General gait:   Normal arm swing and turns. Normal postural reflexes.   Tandem gait:  Intact    Visuospatial/Executive  Alternating Trail Makin - correct  Cube: 0 - incorrect  Clock Contour: 1 - correct  Clock Numbers: 1 - correct  Clock Hands: 1 - correct  Naming  Lion: 1 - correct  Rhino: 1 - correct  Camel: 1 - correct  Memory - First Trial  Face: Yes  Velvet: Yes  Latter day: Yes  Laurie: Yes  Red: Yes  Memory - Second Trial  Face: Yes  Velvet: Yes  Latter day: Yes  Laurie: Yes  Red: Yes  Attention  Forward Order - 2 1 8 5 4: 1 - correct  Attention - Backward Order: 1 - correct  Letters: 1 - correct  Numbers: 2 - 2 or 3 correct  Language  Repeat - Aric: 1 - correct  Repeat - Cat: 1 - correct  Fluency : 1 - correct  Abstraction  Train - Bicycle: 1 - correct  Watch-Ruler: 1 - correct  Delayed Recall  Face: 0 - incorrect  Velvet: 0 - incorrect  Latter day: 0 - incorrect  Laurie: 0 - incorrect  Red: 0 -  incorrect  Orientation  Date: 1 - correct  Month: 1 - correct  Year: 1 - correct  Day: 1 - correct  Place: 1 - correct  City: 1 - correct  Other  Add 1 point if less than or equal to 12 yr edu: 0 - incorrect  Total Score: 23 (1/4/16)                          TSH   Date/Time Value Ref Range Status   04/11/2018 02:36 PM 0.745 0.400 - 4.000 uIU/mL Final   03/13/2017 10:05 AM 1.407 0.400 - 4.000 uIU/mL Final   05/29/2015 12:30 PM 1.819 0.400 - 4.000 uIU/mL Final   05/07/2014 12:18 PM 1.368 0.400 - 4.000 uIU/mL Final   02/02/2014 09:26 PM 1.112 0.400 - 4.000 uIU/mL Final   02/07/2013 06:04 PM 13.210 (H) 0.4 - 4.0 uIU/ml Final   Results for PUSHPA KEY (MRN [id number]) as of 1/4/2017 09:19   Ref. Range 7/20/2015 11:06 8/19/2015 14:15   Vitamin B2 Latest Ref Range: 1 - 19 mcg/L  2   Vitamin B6 Latest Ref Range: 5 - 50 ug/L  4 (L)   Vit D, 25-Hydroxy Latest Ref Range: 30 - 96 ng/mL 13 (L)      Neuro-imaging personally reviewed    Results for orders placed or performed during the hospital encounter of 03/14/16   CT Head Without Contrast    Narrative    Procedure comment: CT examination of the head was performed from the skull base through the vertex without the use of intravenous contrast using 5-mm axial images.    Comparison: CT head 03/24/2015    Findings: There is no evidence of acute or recent major vascular territory cerebral infarction, parenchymal hemorrhage, or intra-axial mass.  There are no extra-axial masses or abnormal fluid collections.  The ventricular system is within normal limits of size for age and shows no distortion by mass-effect or evidence of hydrocephalus.  There is no fracture or destructive osseous lesion.  The extracranial soft tissues are unremarkable.  The visualized paranasal sinuses and mastoid air cells are clear.    Impression     No acute intracranial abnormality.  ______________________________________     Electronically signed by resident: KRISSY MAYERS MD  Date:      03/14/16  Time:    17:09     ______________________________________     Electronically signed by: RAYMOND MATHEWS MD  Date:     03/14/16  Time:    17:31    Results for PUSHPA KEY (MRN [id number]) as of 1/4/2017 09:19   Ref. Range 7/20/2015 11:06   Vitamin B-12 Latest Ref Range: 210 - 950 pg/mL 383     SUMMARY AND RECOMMENDATIONS from neuropsych testing:   Ms. Key was referred for Neuropsychological Evaluation on an outpatient basis due to subjective residual memory impairment following acute cardiac arrest complicated by a stroke and closed head injury (hypoxic brain injury), all of which occurred on 2/7/2013. Her general cognitive abilities as assessed by the RBANS were in the severely impaired range, with moderately impaired language (due to reduced verbal fluency); and severely impaired immediate verbal memory, visuospatial/constructional abilities, attention, and delayed memory. Further assessment of specific cognitive abilities revealed deficits in temporal orientation, naming, verbal fluency, and facial recognition. Constructional ability was unimpaired. Additional memory assessment revealed severely impaired immediate and delayed memory. Personality test data suggested the presence of severe depression and moderate anxiety. Neuropsychological testing reveals moderate to severe impairment in memory, attention, temporal orientation, visuospatial/constructional abilities, facial recognition, and verbal fluency due to hypoxic brain injury and stroke. Naming was variable with performances in the average and moderately impaired range. She will follow up with Juan Carlos Childers DNP in Psychiatry for depression and anxiety.    Assessment:        1. Impaired mobility and ADLs     2. Headaches due to old head injury     3. Mixed hyperlipidemia     4. History of CVA (cerebrovascular accident)     5. History of DVT (deep vein thrombosis)     6. Essential hypertension         50 yo AAF with HIE w/ CVA (2013),  Asthma, CAD, depression, HTN, PM, seizures, CVA who presents for concussion evaluation..  History is notable for PEA s/p cooling and suspected HIE and CVA in 2013 and subsequent chronic and intractable headaches.  Exam is notable for obese AAF with normal cervical ROM, CTAB, and RRR.  Neuro exam is notable for pleasant female, with diminished facial sensation on L, o/w intact CN including VFFC, normal tone and, no pronator drift, decreased sensation LUE and LLE, normal gait, negative Romberg, negative cortical signs, and MOCA 23/30 (immediate recall, visuospatial)  Workup is notable unremarkable CTH, low Vit D, low normal B12, low B6.  Neuropsych eval revealed anxiety/depression and cognitive impairments in memory, attention, temporal orientation, visuospatial/constructional abilities, facial recognition, and verbal fluency.  Pt's presentation is c/w HIE.   It is unlikely her concussion is contributing to her headaches, but other events like CVA and HIE also contributing.        Plan:       -conservative measures: cognitive rest, avoid further TBIs, abstain from EtOH  -sleep/wake cycle:   -sleep hygiene   -trazodone 100mg QHS PRN, educated about serotonin syndrome   -sleep clinic referral UARS and CPAP  -cognitive(memory losses):  SLP, repeat neuropsych test  -ADLs: OT  -headaches: per Dr. Cortez   -PT cervical ROM  -BIALA and ABIS referral        Missy Rock MD  Neurologist  [facility]     Focused examination was undertaken today.  I spent 25 minutes with the patient.  >50% of that time was spent on counseling regarding her symptoms, review of diagnostics, and building and coordinating a treatment plan based on the combination of results and symptoms.   Questions were sought and answered to her stated verbal satisfaction.    Subjective:       Patient ID: Amna Chawla is a 53 y.o. female.    Chief Complaint:  No chief complaint on file.      History of Present Illness    52 yo AAF  "with HIE w/ CVA (2013), Asthma, CAD, depression, HTN, PM, seizures, CVA who presents for concussion evaluation.  Pt was last seen on 1/4/17 and at that time we recommended:    -conservative measures: cognitive rest, avoid further TBIs, abstain from EtOH  -MRI Brain (contra-indicated due to PM)  -sleep/wake cycle:   -sleep hygiene   -melatonin 0.1-0.3mg at night  -cognitive(memory losses):  SLP, repeat neuropsych test  -ADLs: OT  -headaches: per Dr. Cortez   -PT cervical ROM  -BIALA and ABIS referral    In the interim, pt continues to have headaches.  She has started SLP and PT.  NP and OT testing is pending.  Pt is not sleeping well.  She tried melatonin in the past and developed 'shakes'.  She has tried trazodone with no effect either positive or adverse.  She was evaluated by Sleep [facility]inic in 2014 and found to have UARS and insurance did not cover CPAP.       Initial HPI(1/4/17)  Per previous records, patient was in the hospital early 2013 after "passed out" and became unresponsive. CPR in the field for 8 minutes until EMS arrived. Per ED note, on arrival she was found to be in PEA, given epinephrine. Vfib/Vtach was achieved which was shocked x1. Patient converted to sinus tachycardia after shock. I do not know the time course for the above treatment. On arrival to facility patient was in sinus tachycardia with strong pulses, intubated and unresponsive. ET tube placement was confirmed with direct laryngoscopy and good bilateral breath sounds were heard after the tube was pulled back 2 cm. Reported to have "withdrawal" movements in ER during stabilization, then sedated and cooling protocol initiated. Had remarkable recovery and first seen in clinic in April 2013.  During this episode patient fell and hit her head.  NO bleed was noted at that time.    She has extensive headaches.  She is on numerous PPX medications and also receives botox and infusion therapy.    Her headaches are associated with neck pain.  She has " received trigger point injections which have helped.  She has associated nausea, dizziness.    She endorses poor sleep.  She is currently using tizanadine.  She endorses cognitive issues and is currently donepezil, which helps somewhat.  Her mood is labile, and currently is good.  She denies SI/HI.  She has episodes that she calls seizures.  She says she is aware of shaking episodes (2-3 since the cardiac arrest).    She denies any changes in vision, previous head/neck trauma, appetite/smell changes.  She is no longer having her cycle.  SHe is not involved in litigation.   She lives with her daughters.   SHe is independent in ADLs, but requires assistance iADLs.  She acknowledges difficulties with house-hold chores, e.g. Cooking, dish washing, etc.            Past Medical History:   Diagnosis Date    Anxiety     Asthma     Brain anoxic injury     Coronary artery disease     Defibrillator activation 2013    Depression     History of sudden cardiac arrest 2/2013    PEA arrest with subsequent long-QT    Hx of psychiatric care     Hypertension     Left atrial enlargement 4/11/2018    Pacemaker 2013    Psychiatric problem     Seizures     Sleep difficulties     Stroke     weakness lt side    Therapy        Past Surgical History:   Procedure Laterality Date    breast reduction      BREAST SURGERY      CARDIAC DEFIBRILLATOR PLACEMENT      CARDIAC DEFIBRILLATOR PLACEMENT      CARPAL TUNNEL RELEASE Right     COSMETIC SURGERY      HERNIA REPAIR      HIATAL HERNIA REPAIR      INSERT / REPLACE / REMOVE PACEMAKER      TUBAL LIGATION         Family History   Problem Relation Age of Onset    Hypertension Mother     COPD Unknown     Heart failure Unknown     Glaucoma Maternal Grandmother     Glaucoma Paternal Grandmother        Social History     Social History    Marital status: Single     Spouse name: N/A    Number of children: N/A    Years of education: N/A     Occupational History     [employer]     Social History Main Topics    Smoking status: Never Smoker    Smokeless tobacco: Never Used    Alcohol use No    Drug use: No    Sexual activity: Not Currently     Other Topics Concern    Not on file     Social History Narrative    No narrative on file     2/21/17            Current Outpatient Prescriptions:     ARIPiprazole (ABILIFY) 5 MG Tab, Take 1 tablet (5 mg total) by mouth every evening., Disp: 30 tablet, Rfl: 5    atorvastatin (LIPITOR) 40 MG tablet, Take 1 tablet (40 mg total) by mouth every morning., Disp: 90 tablet, Rfl: 3    carvedilol (COREG) 25 MG tablet, TAKE 1 TABLET(25 MG) BY MOUTH TWICE DAILY WITH MEALS, Disp: 180 tablet, Rfl: 1    cetirizine (ZYRTEC) 10 MG tablet, Take 1 tablet (10 mg total) by mouth once daily., Disp: 90 tablet, Rfl: 11    clonazePAM (KLONOPIN) 1 MG tablet, Take 1 tablet (1 mg total) by mouth daily as needed for Anxiety., Disp: 30 tablet, Rfl: 5    clopidogrel (PLAVIX) 75 mg tablet, Take 1 tablet (75 mg total) by mouth once daily., Disp: 90 tablet, Rfl: 2    donepezil (ARICEPT) 10 MG tablet, Take 1 tablet (10 mg total) by mouth 2 (two) times daily., Disp: 180 tablet, Rfl: 3    DULoxetine (CYMBALTA) 60 MG capsule, Take 1 capsule (60 mg total) by mouth 2 (two) times daily., Disp: 60 capsule, Rfl: 5    erenumab-aooe 70 mg/mL AtIn, Inject 70 mg into the skin every 30 days., Disp: 1 mL, Rfl: 6    gabapentin (NEURONTIN) 300 MG capsule, Take 3 capsules (900 mg total) by mouth 3 (three) times daily., Disp: 810 capsule, Rfl: 12    hydrOXYzine (ATARAX) 50 MG tablet, Take 1 tablet (50 mg total) by mouth nightly as needed., Disp: 30 tablet, Rfl: 5    LORazepam (ATIVAN) 1 MG tablet, Take 1 tablet (1 mg total) by mouth as needed for Anxiety., Disp: 1 tablet, Rfl: 0    losartan (COZAAR) 50 MG tablet, Take 1 tablet (50 mg total) by mouth once daily., Disp: 90 tablet, Rfl: 3    magnesium 200 mg Tab, Take 200 mg by mouth once daily. (Patient taking  differently: Take 200 mg by mouth every other day. ), Disp: 30 each, Rfl: 12    meloxicam (MOBIC) 15 MG tablet, Take 1 tablet (15 mg total) by mouth once daily., Disp: 30 tablet, Rfl: 3    ondansetron (ZOFRAN-ODT) 4 MG TbDL, Take 1 tablet (4 mg total) by mouth every 12 (twelve) hours as needed (nausea)., Disp: 10 tablet, Rfl: 1    pantoprazole (PROTONIX) 40 MG tablet, Take 1 tablet (40 mg total) by mouth once daily., Disp: 30 tablet, Rfl: 11    rivaroxaban (XARELTO) 20 mg Tab, Take 1 tablet (20 mg total) by mouth daily with dinner or evening meal., Disp: 30 tablet, Rfl: 11    tiaGABine (GABITRIL) 4 MG tablet, Take 1 tablet (4 mg total) by mouth every evening., Disp: 30 tablet, Rfl: 12    triamcinolone acetonide 0.1% (KENALOG) 0.1 % cream, AAA bid to rash up to 2 weeks, Disp: 60 g, Rfl: 1    zolpidem (AMBIEN CR) 12.5 MG CR tablet, TAKE 1 TABLET BY MOUTH NIGHTLY AS NEEDED FOR INSOMNIA, Disp: 30 tablet, Rfl: 0    topiramate (TOPAMAX) 100 MG tablet, Take 2 tablets (200 mg total) by mouth 2 (two) times daily., Disp: 120 tablet, Rfl: 12    Current Facility-Administered Medications:     ketorolac injection 60 mg, 60 mg, Intramuscular, 1 time in Clinic/HOD, David Cortez MD    onabotulinumtoxina injection 200 Units, 200 Units, Intramuscular, Q90 Days, David Cortez MD, 200 Units at 05/16/18 1107    onabotulinumtoxina injection 200 Units, 200 Units, Intramuscular, Q90 Days, David Cortez MD, 200 Units at 08/02/18 1112    Review of Systems  Review of Systems   Constitutional: Negative for chills and fever.   Gastrointestinal: Positive for nausea and vomiting.   Neurological: Positive for dizziness, focal weakness, seizures, loss of consciousness and headaches. Negative for tingling, tremors, sensory change and speech change.   Psychiatric/Behavioral: Positive for depression and memory loss. Negative for hallucinations, substance abuse and suicidal ideas. The patient has insomnia. The patient is not  nervous/anxious.        1/4/16          Objective:     Vitals:    08/07/18 0833   BP: (!) 163/79   Pulse: 65      Physical Exam      Constitutional: obese AA female  HENT:   Head: Normocephalic and atraumatic.   Neck and spine: Normal range of motion. Neck supple. No TTP in cervical, thoracic, and lumbar spine  Cardiovascular: Normal rate, regular rhythm and normal heart sounds.    Pulmonary/Chest: Effort normal and breath sounds normal.   Abdominal: Soft. Bowel sounds are normal.   Skin: Skin is warm.   Ext: No c/c/e noted    The patient is awake, attentive, Alert, oriented to person, place and time.  Language is intact to comprehension, repetition, and production  Able to follow multi-step commands  No findings to suggest executive dysfuntion    Patient has adequate insight    Mood is stable      Pursuits were smooth, normal saccades, no nystagmus bilaterally  PERRL, VFFC, EOMI, sensation is diminished to LT L    Motor - facial movement was symmetrical and normal, no facial droop seen.   hearing grossly intact  Palate moved well and was symmetrical with normal palatal and oral sensation  Tongue protrudes midline and movements were full      Normal tone.  No spasticity, cogwheel rigidity  Normal bulk. No pronator drift                        Right      Left  Deltoid            5          5  Biceps            5          5  Triceps           5          5  BR                  5          5                   5          5    Hip Flex            5          5  Hip Ext             5          5  Leg ABduc       5          5  Leg ADduc       5          5  Knee Flex         5          5  Knee Ext          5          5  Plantar Flexion  5          5  Dorsiflexion       5          5      No tremor noted    Reflexes normal and symmteric in bl upper and lower extremities, including biceps, triceps, and patellar    Sensory:  Light touch: diminished on L  Pinprick sensation:  diminished on L    Coordination:  F-to-N:  intact     H-to-S:  intact   Rapid-alt movements:  Normal amplitude and frequency     Romberg Sign:  negative  General gait:   Normal arm swing and turns. Normal postural reflexes.   Tandem gait:  Intact    Visuospatial/Executive  Alternating Trail Makin - correct  Cube: 0 - incorrect  Clock Contour: 1 - correct  Clock Numbers: 1 - correct  Clock Hands: 1 - correct  Naming  Lion: 1 - correct  Rhino: 1 - correct  Camel: 1 - correct  Memory - First Trial  Face: Yes  Velvet: Yes  Episcopal: Yes  Laurie: Yes  Red: Yes  Memory - Second Trial  Face: Yes  Velvet: Yes  Episcopal: Yes  Laurie: Yes  Red: Yes  Attention  Forward Order - 2 1 8 5 4: 1 - correct  Attention - Backward Order: 1 - correct  Letters: 1 - correct  Numbers: 2 - 2 or 3 correct  Language  Repeat - Aric: 1 - correct  Repeat - Cat: 1 - correct  Fluency : 1 - correct  Abstraction  Train - Bicycle: 1 - correct  Watch-Ruler: 1 - correct  Delayed Recall  Face: 0 - incorrect  Velvet: 0 - incorrect  Episcopal: 0 - incorrect  Laurie: 0 - incorrect  Red: 0 - incorrect  Orientation  Date: 1 - correct  Month: 1 - correct  Year: 1 - correct  Day: 1 - correct  Place: 1 - correct  City: 1 - correct  Other  Add 1 point if less than or equal to 12 yr edu: 0 - incorrect  Total Score: 23 (16)                          TSH   Date/Time Value Ref Range Status   2018 02:36 PM 0.745 0.400 - 4.000 uIU/mL Final   2017 10:05 AM 1.407 0.400 - 4.000 uIU/mL Final   2015 12:30 PM 1.819 0.400 - 4.000 uIU/mL Final   2014 12:18 PM 1.368 0.400 - 4.000 uIU/mL Final   2014 09:26 PM 1.112 0.400 - 4.000 uIU/mL Final   2013 06:04 PM 13.210 (H) 0.4 - 4.0 uIU/ml Final   Results for PUSHPA KEY (MRN [id number]) as of 2017 09:19   Ref. Range 2015 11:06 2015 14:15   Vitamin B2 Latest Ref Range: 1 - 19 mcg/L  2   Vitamin B6 Latest Ref Range: 5 - 50 ug/L  4 (L)   Vit D, 25-Hydroxy Latest Ref Range: 30 - 96 ng/mL 13 (L)      Neuro-imaging personally  reviewed    Results for orders placed or performed during the hospital encounter of 03/14/16   CT Head Without Contrast    Narrative    Procedure comment: CT examination of the head was performed from the skull base through the vertex without the use of intravenous contrast using 5-mm axial images.    Comparison: CT head 03/24/2015    Findings: There is no evidence of acute or recent major vascular territory cerebral infarction, parenchymal hemorrhage, or intra-axial mass.  There are no extra-axial masses or abnormal fluid collections.  The ventricular system is within normal limits of size for age and shows no distortion by mass-effect or evidence of hydrocephalus.  There is no fracture or destructive osseous lesion.  The extracranial soft tissues are unremarkable.  The visualized paranasal sinuses and mastoid air cells are clear.    Impression     No acute intracranial abnormality.  ______________________________________     Electronically signed by resident: KRISSY MAYERS MD  Date:     03/14/16  Time:    17:09     ______________________________________     Electronically signed by: RAYMOND MATHEWS MD  Date:     03/14/16  Time:    17:31    Results for PUSHPA KEY (MRN [id number]) as of 1/4/2017 09:19   Ref. Range 7/20/2015 11:06   Vitamin B-12 Latest Ref Range: 210 - 950 pg/mL 383     SUMMARY AND RECOMMENDATIONS from neuropsych testing:   Ms. Key was referred for Neuropsychological Evaluation on an outpatient basis due to subjective residual memory impairment following acute cardiac arrest complicated by a stroke and closed head injury (hypoxic brain injury), all of which occurred on 2/7/2013. Her general cognitive abilities as assessed by the RB[facility] were in the severely impaired range, with moderately impaired language (due to reduced verbal fluency); and severely impaired immediate verbal memory, visuospatial/constructional abilities, attention, and delayed memory. Further assessment of specific cognitive  abilities revealed deficits in temporal orientation, naming, verbal fluency, and facial recognition. Constructional ability was unimpaired. Additional memory assessment revealed severely impaired immediate and delayed memory. Personality test data suggested the presence of severe depression and moderate anxiety. Neuropsychological testing reveals moderate to severe impairment in memory, attention, temporal orientation, visuospatial/constructional abilities, facial recognition, and verbal fluency due to hypoxic brain injury and stroke. Naming was variable with performances in the average and moderately impaired range. She will follow up with Juan Carlos Childers DNP in Psychiatry for depression and anxiety.      [facility](3/2017) Neuro-imaging personally reviewed  No acute intracranial abnormality identified.     INR 1.8(3/20/17)  Assessment:        1. Impaired mobility and ADLs     2. Headaches due to old head injury     3. Mixed hyperlipidemia     4. History of CVA (cerebrovascular accident)     5. History of DVT (deep vein thrombosis)     6. Essential hypertension         52 yo AAF with HIE w/ CVA (2013), Asthma, CAD, depression, HTN, PM, seizures, CVA who presents for concussion evaluation..  History is notable for PEA s/p cooling and suspected HIE and CVA in 2013 and subsequent chronic and intractable headaches.  Interval hx is notable for another mTBI.  Exam is notable for obese AAF with normal cervical ROM, CTAB, and RRR.  Neuro exam is notable for pleasant female, with diminished facial sensation on L, o/w intact CN including VFFC, normal tone and, no pronator drift, decreased sensation LUE and LLE, normal gait, negative Romberg, negative cortical signs, and MOCA 23/30 (immediate recall, visuospatial)  Workup is notable unremarkable INR 1.8(3/20/17), CTH, low Vit D, low normal B12, low B6.  Neuropsych eval revealed anxiety/depression and cognitive impairments in memory, attention, temporal orientation,  visuospatial/constructional abilities, facial recognition, and verbal fluency.  Pt's presentation is c/w HIE.   It is unlikely her concussion is contributing to her headaches, but other events like CVA and HIE also contributing.  Also concerning for polypharmacy.(BDZ, abilify, duloxetine)        Plan:     Functional s/p HIE  -conservative measures: cognitive rest, avoid further TBIs, abstain from EtOH  -sleep/wake cycle:   -sleep hygiene   -stop trazodone    -f/u sleep clinic referral, Lorena Kaufman  -cognitive(memory losses):  SLP, cont donepezil  -ADLs: OT  -cervical ROM: PT  -headaches: per Dr. Cortez  -continue living in supportive setting, no driving  -[facility] and [facility] referral    Secondary CVA prevention  - Plan for secondary prevention of stroke:  (a) Antiplatelet medication: plavix, DTI . (b)Cholesterol medication: atorvastatin. (c) anti-hypertensive medications cozaar    -goal LDL-C between 70 mg/dL (1.81 mmol/L) and 189 mg/dL (4.90 mmol/L)    -goal blood pressure is usually <140 systolic as well as <90 mmHg   -Follow up with cardiology  -educated about healthy diet: low fat, avoid saturated fats, high in vegetables and fruits  -other life-style modifications:  weight reduction, especially in overweight/obese patients, salt restriction, and avoidance of excess alcohol intake      RTC 6 months    I and/or my team have spent 15 minutes face to face with the patient on preventative measures, at least of half of which was spent on counseling on selected preventative measures that may include diet and exercise, substance abuse, and diagnostic/laboratory items that are due for this patient. Magali Lee MD  Neurologist  [facility]     Focused examination was undertaken today.  I spent 25 minutes with the patient.  >50% of that time was spent on counseling regarding her symptoms, review of diagnostics, and building and coordinating a treatment plan based on the  combination of results and symptoms.   Questions were sought and answered to her stated verbal satisfaction.    Greater Occipital Nerve Block Injection Procedure  (3/22/17)  Pre-operative diagnosis: Cervigogenic headache   Post-operative diagnosis: Same   Procedure: Chemical neurolysis     Procedure note:   GONB (Greater Occipital Nerve Block): After risks and benefits were explained including bleeding, infection, worsening of pain, damage to the areas being injected, weakness of muscles, or loss of muscle control.  After informed consent was obtained (a copy was given to office staff to scan into the EMR), the patient was asked to remain in a sitting position with their head resting prone ontheir chest. The area was prepped using alcohol swabs. 2% lidocaine (2 mL x2 sides of neck=4 mL total) and 40 mg (1 bottle per sitex2 for total of 80 mg)depomedrol was drawn up utilizing a 21 gauge needle. The occipital trigger points were palpated utilizing latex gloves, a 27 gauge needle was utilized and aspiration to ensure no blood was used during the technique. The medication was delivered bilateral (all of the above was duplicated for the opposite side) in sites 1) midway between the inion and mastoid along the occipital ridge, 2) 2 finger breaths superior lateral to the first injection and 3) 2 finger breaths superior medial to the first injection. The patient tolerated the procedure well with no active bleeding, erythema, or discharge.  The patient was assessed and allowed to leave after ten minutes.  Findings from repeat exam (10 mins after procedure) showed:    Gen: NAD  Derm: no drainage  Neuro: AOx3, EOMI, tongue in midline, FROM of all extremities, OOC/gait WNL         Missy Rock MD  Neurologist  [facility]

## 2018-08-09 ENCOUNTER — CLINICAL SUPPORT (OUTPATIENT)
Dept: ELECTROPHYSIOLOGY | Facility: CLINIC | Age: 53
End: 2018-08-09
Attending: INTERNAL MEDICINE
Payer: MEDICARE

## 2018-08-09 DIAGNOSIS — Z86.74 HISTORY OF SUDDEN CARDIAC ARREST: ICD-10-CM

## 2018-08-09 DIAGNOSIS — I45.81 PROLONGED QT SYNDROME: ICD-10-CM

## 2018-08-09 DIAGNOSIS — G45.9 TIA (TRANSIENT ISCHEMIC ATTACK): ICD-10-CM

## 2018-08-09 DIAGNOSIS — I44.2 COMPLETE AV BLOCK: ICD-10-CM

## 2018-08-09 DIAGNOSIS — Z95.810 AICD (AUTOMATIC CARDIOVERTER/DEFIBRILLATOR) PRESENT: ICD-10-CM

## 2018-08-09 PROCEDURE — 93284 PRGRMG EVAL IMPLANTABLE DFB: CPT | Mod: PBBFAC | Performed by: INTERNAL MEDICINE

## 2018-08-10 ENCOUNTER — TELEPHONE (OUTPATIENT)
Dept: SLEEP MEDICINE | Facility: CLINIC | Age: 53
End: 2018-08-10

## 2018-08-16 ENCOUNTER — TELEPHONE (OUTPATIENT)
Dept: ELECTROPHYSIOLOGY | Facility: CLINIC | Age: 53
End: 2018-08-16

## 2018-08-16 NOTE — TELEPHONE ENCOUNTER
She has a consult in October.  I'm going to send message to their staff to see if they can move her up.

## 2018-08-16 NOTE — TELEPHONE ENCOUNTER
Spoke to Ms. Chawla, recommendations to stop Xarelto until bleeding stops.  If bleeding does not stop advised to go to ED.    Ms. Chawla verbalizes understanding of above and appreciates call.

## 2018-08-16 NOTE — TELEPHONE ENCOUNTER
----- Message from Pacheco Jorge MA sent at 8/16/2018  4:17 PM CDT -----  Contact: Patient      ----- Message -----  From: Traci Martinez  Sent: 8/16/2018   4:08 PM  To: [facility] Staff    The is calling to report that she has been having nose bleeds that last for hours. Please call her back @ [phone]. Thanks, Traci

## 2018-08-16 NOTE — TELEPHONE ENCOUNTER
Spoke to Ms. Chawla who reports nose bleed that continues to bleed.  She states she was instructed by an ER nurse last visit to insert a tampon in her nostril to stop the bleeding, so pt has done so and bleeding has slowed tremendously.      Pt also reports dizziness this morning, but none currently.  Advised pt with the dizziness I believe she should be evaluated by an ED/urgent care, however pt declines at this time stating she will monitor how she is feeling and go if symptoms worsen.

## 2018-08-17 ENCOUNTER — TELEPHONE (OUTPATIENT)
Dept: OTOLARYNGOLOGY | Facility: CLINIC | Age: 53
End: 2018-08-17

## 2018-08-21 ENCOUNTER — OFFICE VISIT (OUTPATIENT)
Dept: CARDIOLOGY | Facility: CLINIC | Age: 53
End: 2018-08-21
Payer: MEDICARE

## 2018-08-21 ENCOUNTER — OFFICE VISIT (OUTPATIENT)
Dept: OTOLARYNGOLOGY | Facility: CLINIC | Age: 53
End: 2018-08-21
Payer: MEDICARE

## 2018-08-21 VITALS
BODY MASS INDEX: 48.25 KG/M2 | SYSTOLIC BLOOD PRESSURE: 116 MMHG | DIASTOLIC BLOOD PRESSURE: 59 MMHG | HEIGHT: 64 IN | HEART RATE: 87 BPM | WEIGHT: 282.63 LBS

## 2018-08-21 VITALS
DIASTOLIC BLOOD PRESSURE: 70 MMHG | SYSTOLIC BLOOD PRESSURE: 112 MMHG | HEART RATE: 63 BPM | BODY MASS INDEX: 48.25 KG/M2 | HEIGHT: 64 IN | WEIGHT: 282.63 LBS

## 2018-08-21 DIAGNOSIS — Z79.01 LONG TERM (CURRENT) USE OF ANTICOAGULANTS: ICD-10-CM

## 2018-08-21 DIAGNOSIS — E66.01 OBESITY, CLASS III, BMI 40-49.9 (MORBID OBESITY): ICD-10-CM

## 2018-08-21 DIAGNOSIS — R04.0 RIGHT-SIDED EPISTAXIS: Primary | ICD-10-CM

## 2018-08-21 DIAGNOSIS — I48.0 PAROXYSMAL ATRIAL FIBRILLATION: ICD-10-CM

## 2018-08-21 DIAGNOSIS — I10 ESSENTIAL HYPERTENSION: ICD-10-CM

## 2018-08-21 DIAGNOSIS — Z86.73 HISTORY OF CVA (CEREBROVASCULAR ACCIDENT): Primary | ICD-10-CM

## 2018-08-21 DIAGNOSIS — Z95.810 BIVENTRICULAR AUTOMATIC IMPLANTABLE CARDIOVERTER DEFIBRILLATOR IN SITU: ICD-10-CM

## 2018-08-21 DIAGNOSIS — R04.0 EPISTAXIS: ICD-10-CM

## 2018-08-21 DIAGNOSIS — I42.8 NONISCHEMIC CARDIOMYOPATHY: ICD-10-CM

## 2018-08-21 DIAGNOSIS — I46.9 CARDIAC ARREST: ICD-10-CM

## 2018-08-21 DIAGNOSIS — E78.2 MIXED HYPERLIPIDEMIA: ICD-10-CM

## 2018-08-21 DIAGNOSIS — G47.33 OBSTRUCTIVE SLEEP APNEA SYNDROME: ICD-10-CM

## 2018-08-21 DIAGNOSIS — I44.2 COMPLETE AV BLOCK: ICD-10-CM

## 2018-08-21 DIAGNOSIS — R42 LIGHTHEADEDNESS: ICD-10-CM

## 2018-08-21 PROCEDURE — 93005 ELECTROCARDIOGRAM TRACING: CPT | Mod: PBBFAC | Performed by: INTERNAL MEDICINE

## 2018-08-21 PROCEDURE — 99213 OFFICE O/P EST LOW 20 MIN: CPT | Mod: PBBFAC,25 | Performed by: INTERNAL MEDICINE

## 2018-08-21 PROCEDURE — 93010 ELECTROCARDIOGRAM REPORT: CPT | Mod: S$PBB,,, | Performed by: INTERNAL MEDICINE

## 2018-08-21 PROCEDURE — 99999 PR PBB SHADOW E&M-EST. PATIENT-LVL III: CPT | Mod: PBBFAC,,, | Performed by: OTOLARYNGOLOGY

## 2018-08-21 PROCEDURE — 99214 OFFICE O/P EST MOD 30 MIN: CPT | Mod: S$PBB,,, | Performed by: INTERNAL MEDICINE

## 2018-08-21 PROCEDURE — 99999 PR PBB SHADOW E&M-EST. PATIENT-LVL III: CPT | Mod: PBBFAC,,, | Performed by: INTERNAL MEDICINE

## 2018-08-21 PROCEDURE — 99203 OFFICE O/P NEW LOW 30 MIN: CPT | Mod: S$PBB,,, | Performed by: OTOLARYNGOLOGY

## 2018-08-21 PROCEDURE — 99213 OFFICE O/P EST LOW 20 MIN: CPT | Mod: PBBFAC,27 | Performed by: OTOLARYNGOLOGY

## 2018-08-21 RX ORDER — CARVEDILOL 25 MG/1
TABLET ORAL
Qty: 180 TABLET | Refills: 3 | Status: SHIPPED | OUTPATIENT
Start: 2018-08-21 | End: 2020-01-03

## 2018-08-21 RX ORDER — ROSUVASTATIN CALCIUM 40 MG/1
40 TABLET, COATED ORAL NIGHTLY
Qty: 90 TABLET | Refills: 3 | Status: SHIPPED | OUTPATIENT
Start: 2018-08-21 | End: 2019-08-01 | Stop reason: SDUPTHER

## 2018-08-21 NOTE — PATIENT INSTRUCTIONS
Pt. reassured; no obvious cause of bleeding identified   Written nosebleed instructions provided  To EBETSY for significant bleeding  Piece of Surgicel provided for minor anterior naswal bleeding  Monitor blood pressure/blood work  Consider electrocautery for specific bleeding site/lesion per Dr. CHRISTINE Martinez pending course  Rx for 2% mupirocin ointment provided( may us Bacitracin) ; apply to nasal vestibules nightly x 2-3 weeks  TMJ otalgia literatrure provied

## 2018-08-21 NOTE — PROGRESS NOTES
Subjective:   Patient ID:  Amna Chawla is a 53 y.o. female who presents for follow-up of Atrial Fibrillation      HPI: She's been feeling lightheaded since Friday. She has been having significant epistaxis about twice a week, associated with clots and lasting a few hours at a time. She was seen in the ED on 7/22 and had nasal packing placed to tamponade the bleeding. She is on Xarelto for her afib but stopped it on Friday after speaking with Dr Mejia. She is scheduled to see ENT later today. Amna Chawla denies any chest pain, shortness of breath, PND, orthopnea, palpitations, leg edema, or syncope. Her blood pressure is lower than usual. She has not had any changes in her medications other than changing valsartan to losartan due to the recall. Her last echo was 2/18 and showed improvement in her LVEF from 20% to 45-50% after beginning CRT-D therapy. Other than the lightheadedness, she has been felling great. Her dyspnea and energy levels have improved since her upgrade. Her last ICD interrogation was 8/18 and showed LV pacing 100%, no VT and no MS for afib. PET stress 3/17 was negative and previous cardiac CTA in 2013 showed no CAD and coronary calcium score of 0.    ECG: Atrial sensed biV paced rhythm 68 bpm      Past Medical History:   Diagnosis Date    Anxiety     Asthma     Atrial fibrillation     Brain anoxic injury     Defibrillator activation 2013    Depression     Heart block     History of sudden cardiac arrest 2/2013    PEA arrest with subsequent long-QT    Hx of psychiatric care     Hypertension     Left atrial enlargement 4/11/2018    Pacemaker 2013    Psychiatric problem     Seizures     Sleep difficulties     Stroke     weakness lt side    Therapy        Past Surgical History:   Procedure Laterality Date    breast reduction      BREAST SURGERY      CARDIAC DEFIBRILLATOR PLACEMENT      CARDIAC DEFIBRILLATOR PLACEMENT      CARPAL TUNNEL RELEASE Right     COSMETIC SURGERY       HERNIA REPAIR      HIATAL HERNIA REPAIR      INSERT / REPLACE / REMOVE PACEMAKER  10/2017    CRT-D upgrade    TUBAL LIGATION         Social History     Socioeconomic History    Marital status: Single     Spouse name: None    Number of children: None    Years of education: None    Highest education level: None   Social Needs    Financial resource strain: None    Food insecurity - worry: None    Food insecurity - inability: None    Transportation needs - medical: None    Transportation needs - non-medical: None   Occupational History     Employer: [employer]   Tobacco Use    Smoking status: Never Smoker    Smokeless tobacco: Never Used   Substance and Sexual Activity    Alcohol use: No    Drug use: No    Sexual activity: Not Currently   Other Topics Concern    Patient feels they ought to cut down on drinking/drug use Not Asked    Patient annoyed by others criticizing their drinking/drug use Not Asked    Patient has felt bad or guilty about drinking/drug use Not Asked    Patient has had a drink/used drugs as an eye opener in the AM Not Asked    Are you pregnant or think you may be? Not Asked    Breast-feeding Not Asked   Social History Narrative    None       Family History   Problem Relation Age of Onset    Hypertension Mother     COPD Unknown     Heart failure Unknown     Glaucoma Maternal Grandmother     Glaucoma Paternal Grandmother        Patient's Medications   New Prescriptions    ROSUVASTATIN (CRESTOR) 40 MG TAB    Take 1 tablet (40 mg total) by mouth every evening.   Previous Medications    ARIPIPRAZOLE (ABILIFY) 5 MG TAB    Take 1 tablet (5 mg total) by mouth every evening.    CETIRIZINE (ZYRTEC) 10 MG TABLET    Take 1 tablet (10 mg total) by mouth once daily.    CLONAZEPAM (KLONOPIN) 1 MG TABLET    Take 1 tablet (1 mg total) by mouth daily as needed for Anxiety.    CLOPIDOGREL (PLAVIX) 75 MG TABLET    Take 1 tablet (75 mg total) by mouth once daily.     DIPHTH,PERTUS,ACELL,,TETANUS (BOOSTRIX) 2.5-8-5 LF-MCG-LF/0.5ML SUSP    Inject 0.5 ml into the muscle once.    DONEPEZIL (ARICEPT) 10 MG TABLET    Take 1 tablet (10 mg total) by mouth 2 (two) times daily.    DULOXETINE (CYMBALTA) 60 MG CAPSULE    Take 1 capsule (60 mg total) by mouth 2 (two) times daily.    ERENUMAB-AOOE 70 MG/ML ATIN    Inject 70 mg into the skin every 30 days.    GABAPENTIN (NEURONTIN) 300 MG CAPSULE    Take 3 capsules (900 mg total) by mouth 3 (three) times daily.    HYDROXYZINE (ATARAX) 50 MG TABLET    Take 1 tablet (50 mg total) by mouth nightly as needed.    LORAZEPAM (ATIVAN) 1 MG TABLET    Take 1 tablet (1 mg total) by mouth as needed for Anxiety.    LOSARTAN (COZAAR) 50 MG TABLET    Take 1 tablet (50 mg total) by mouth once daily.    MAGNESIUM 200 MG TAB    Take 200 mg by mouth once daily.    MELOXICAM (MOBIC) 15 MG TABLET    Take 1 tablet (15 mg total) by mouth once daily.    ONDANSETRON (ZOFRAN-ODT) 4 MG TBDL    Take 1 tablet (4 mg total) by mouth every 12 (twelve) hours as needed (nausea).    PANTOPRAZOLE (PROTONIX) 40 MG TABLET    Take 1 tablet (40 mg total) by mouth once daily.    RIVAROXABAN (XARELTO) 20 MG TAB    Take 1 tablet (20 mg total) by mouth daily with dinner or evening meal.    TIAGABINE (GABITRIL) 4 MG TABLET    Take 1 tablet (4 mg total) by mouth every evening.    TOPIRAMATE (TOPAMAX) 100 MG TABLET    Take 2 tablets (200 mg total) by mouth 2 (two) times daily.    TRIAMCINOLONE ACETONIDE 0.1% (KENALOG) 0.1 % CREAM    AAA bid to rash up to 2 weeks    ZOLPIDEM (AMBIEN CR) 12.5 MG CR TABLET    TAKE 1 TABLET BY MOUTH NIGHTLY AS NEEDED FOR INSOMNIA   Modified Medications    Modified Medication Previous Medication    CARVEDILOL (COREG) 25 MG TABLET carvedilol (COREG) 25 MG tablet       TAKE 1 TABLET(25 MG) BY MOUTH TWICE DAILY WITH MEALS    TAKE 1 TABLET(25 MG) BY MOUTH TWICE DAILY WITH MEALS   Discontinued Medications    ATORVASTATIN (LIPITOR) 40 MG TABLET    Take 1 tablet (40  "mg total) by mouth every morning.       Review of Systems   Constitution: Negative for weakness, malaise/fatigue and weight gain.   HENT: Positive for nosebleeds. Negative for hearing loss.    Eyes: Negative for visual disturbance.   Cardiovascular: Negative for chest pain, claudication, dyspnea on exertion, leg swelling, near-syncope, orthopnea, palpitations, paroxysmal nocturnal dyspnea and syncope.   Respiratory: Negative for cough, shortness of breath, sleep disturbances due to breathing, snoring and wheezing.    Endocrine: Negative for cold intolerance, heat intolerance, polydipsia, polyphagia and polyuria.   Hematologic/Lymphatic: Negative for bleeding problem. Does not bruise/bleed easily.   Skin: Negative for rash and suspicious lesions.   Musculoskeletal: Negative for arthritis, falls, joint pain, muscle weakness and myalgias.   Gastrointestinal: Negative for abdominal pain, change in bowel habit, constipation, diarrhea, heartburn, hematochezia, melena and nausea.   Genitourinary: Negative for hematuria and nocturia.   Neurological: Positive for light-headedness. Negative for excessive daytime sleepiness, dizziness, headaches and loss of balance.   Psychiatric/Behavioral: Negative for depression. The patient is not nervous/anxious.    Allergic/Immunologic: Negative for environmental allergies.       BP (!) 116/59 (BP Location: Left arm, Patient Position: Sitting, BP Method: X-Large (Automatic))   Pulse 87   Ht 5' 4" (1.626 m)   Wt 128.2 kg (282 lb 10.1 oz)   BMI 48.51 kg/m²     Objective:   Physical Exam   Constitutional: She is oriented to person, place, and time. She appears well-developed and well-nourished.        HENT:   Head: Normocephalic and atraumatic.   Mouth/Throat: Oropharynx is clear and moist.   Eyes: Conjunctivae and EOM are normal. Pupils are equal, round, and reactive to light. No scleral icterus.   Neck: Normal range of motion. Neck supple. No hepatojugular reflux and no JVD present. " No tracheal deviation present. No thyromegaly present.   Cardiovascular: Normal rate, regular rhythm, normal heart sounds and intact distal pulses. PMI is not displaced.   Pulses:       Carotid pulses are 2+ on the right side, and 2+ on the left side.       Radial pulses are 2+ on the right side, and 2+ on the left side.        Dorsalis pedis pulses are 2+ on the right side, and 2+ on the left side.        Posterior tibial pulses are 2+ on the right side, and 2+ on the left side.   Pulmonary/Chest: Effort normal and breath sounds normal.   Abdominal: Soft. Bowel sounds are normal. She exhibits no distension and no mass. There is no hepatosplenomegaly. There is no tenderness.   Musculoskeletal: She exhibits no edema or tenderness.   Lymphadenopathy:     She has no cervical adenopathy.   Neurological: She is alert and oriented to person, place, and time.   Skin: Skin is warm and dry. No rash noted. No cyanosis or erythema. Nails show no clubbing.   Psychiatric: She has a normal mood and affect. Her speech is normal and behavior is normal.       Lab Results   Component Value Date     04/20/2018    K 3.9 04/20/2018     04/20/2018    CO2 24 04/20/2018    BUN 9 04/20/2018    CREATININE 0.7 04/20/2018     (H) 04/20/2018    HGBA1C 5.6 04/11/2018    MG 1.9 04/12/2018    AST 18 04/12/2018    ALT 29 04/12/2018    ALBUMIN 3.2 (L) 04/12/2018    PROT 9.7 (H) 04/12/2018    BILITOT 0.3 04/12/2018    WBC 2.96 (L) 05/23/2018    HGB 11.3 (L) 05/23/2018    HCT 34.2 (L) 05/23/2018    MCV 90 05/23/2018     05/23/2018    INR 1.0 04/12/2018    TSH 0.745 04/11/2018    CHOL 159 04/11/2018    HDL 38 (L) 04/11/2018    LDLCALC 95.2 04/11/2018    TRIG 129 04/11/2018    BNP 11 02/02/2014       Assessment:     1. History of CVA (cerebrovascular accident) : Continue plavix and statin therapy.    2. Biventricular automatic implantable cardioverter defibrillator in situ    3. Cardiac arrest    4. Complete AV block    5.  Essential hypertension : Blood pressure is at goal. I have made no changes. Continue current regimen.   6. Mixed hyperlipidemia : Change atorvastatin to rosuvastatin 40 mg daily for goal LDL < 70. FLP in 6 months   7. Paroxysmal atrial fibrillation : Xarelto is on hold due to epistaxis. [id number]- Vasc is 5.   8. Nonischemic cardiomyopathy from RV pacing : LV function has improved with CRT-D. Continue losartan and carvedilol.   9. Long term (current) use of anticoagulants    10. Obstructive sleep apnea syndrome : She is scheduled to sleep medicine to be fitted for a new mask.   11. Obesity, Class III, BMI 40-49.9 (morbid obesity)    12. Lightheadedness : I am checking a CBC and BMP given her recent epistaxis. Her blood pressure was on the low side today. She was not orthostatic. I have asked her to hold her carvedilol this evening.    13. Epistaxis : She is scheduled to see ENT today. Her Xarelto is on hold.       Plan:     Amna was seen today for atrial fibrillation.    Diagnoses and all orders for this visit:    History of CVA (cerebrovascular accident)    Biventricular automatic implantable cardioverter defibrillator in situ    Cardiac arrest  -     Cardiac Rehab Phase II; Future    Complete AV block    Essential hypertension  -     rosuvastatin (CRESTOR) 40 MG Tab; Take 1 tablet (40 mg total) by mouth every evening.  -     carvedilol (COREG) 25 MG tablet; TAKE 1 TABLET(25 MG) BY MOUTH TWICE DAILY WITH MEALS    Mixed hyperlipidemia  -     rosuvastatin (CRESTOR) 40 MG Tab; Take 1 tablet (40 mg total) by mouth every evening.  -     carvedilol (COREG) 25 MG tablet; TAKE 1 TABLET(25 MG) BY MOUTH TWICE DAILY WITH MEALS  -     Lipid panel; Future  -     Comprehensive metabolic panel; Future    Paroxysmal atrial fibrillation  -     rosuvastatin (CRESTOR) 40 MG Tab; Take 1 tablet (40 mg total) by mouth every evening.  -     carvedilol (COREG) 25 MG tablet; TAKE 1 TABLET(25 MG) BY MOUTH TWICE DAILY WITH  MEALS    Nonischemic cardiomyopathy from RV pacing  -     rosuvastatin (CRESTOR) 40 MG Tab; Take 1 tablet (40 mg total) by mouth every evening.  -     carvedilol (COREG) 25 MG tablet; TAKE 1 TABLET(25 MG) BY MOUTH TWICE DAILY WITH MEALS  -     Cardiac Rehab Phase II; Future    Long term (current) use of anticoagulants    Obstructive sleep apnea syndrome    Obesity, Class III, BMI 40-49.9 (morbid obesity)    Lightheadedness  -     EKG 12-lead; Future  -     Basic metabolic panel; Future  -     CBC auto differential; Future    Epistaxis  -     EKG 12-lead; Future  -     Basic metabolic panel; Future  -     CBC auto differential; Future        Thank you for allowing me to participate in this patient's care. Please do not hesitate to contact me with any questions or concerns.

## 2018-08-21 NOTE — PROGRESS NOTES
Subjective:       Patient ID: Amna Chawla is a 53 y.o. female.    Chief Complaint: Epistaxis    HPI: Ms. Chawla is a 53-year-old  female with a history of recurrent nose bleeds.    She was most recently bleeding from her right nasal passage.  When she starts tob leed, it  is hard for her to stop the blood flow  which can go on hours.   She takes multiple anticoagulants.  Her Xarelto was stopped 4 days ago.  She is followed by Dr. CORONA Rock of the neurology service for impaired mobility and ADLs. His notes indicate the patient's cognitive memory losses, ADLs treated with occupational therapy, cervical range of motion treated with physical therapy,   headaches evaluated by Dr. Cortez and secondary CVA prevention medication including Plavix, cholesterol medication and antihypertensive medication.  She has a history of CVA in 2013 affecting her right side, asthma, coronary artery disease, depression, hypertension, seizures.  She was evaluated by her internist Dr. KWABENA Allred 07/24/2018 for hospital follow-up in the wake of her nose bleeding episode.  She indicated back pain and headaches at that time.   She visited the ED 07/22/2018 epistaxis which occurred while she was doing her laundry; she felt her nose running relies she was bleeding.  She attempted to pack the nose with tissue.  After 6 hr applying pressure and using ice packs to her neck she presented to the ED.  The bleeding resolved with Afrin and rapid rhino placement which she says was removed prior to her discharge..  The EMR notes indicate her prior history of V fib arrest and  complete heart block status post ICD placement, anoxic brain injury with seizure disorder, history of CVA and TIA most recently 3 weeks prior and use of Plavix, statins and Xarelto.  She had headaches due to a previous head injury managed with Botox injections per Dr. Cortez.    She takes Cymbalta and  clonazepam for depression and anxiety.  She has a history of  insomnia and GERD and history of a thyroid nodule.    Blood were performed 3 months ago indicated a hemoglobin of 11.3, hematocrit of 34.2, RBC of 3.82 and WBC of 2.96 with normal platelet count blood work repeated today indicated a hemoglobin of 11.6 hematocrit of 34.5 WBC of 3.66 an RBC of 3.84.  Her platelet count remains normal.    PMH:  Heart disease, high blood pressure, high cholesterol, seizures, stroke, migraine  Past Surgical History:   Procedure Laterality Date    breast reduction      BREAST SURGERY      CARDIAC DEFIBRILLATOR PLACEMENT      CARDIAC DEFIBRILLATOR PLACEMENT      CARPAL TUNNEL RELEASE Right     COSMETIC SURGERY      HERNIA REPAIR      HIATAL HERNIA REPAIR      INSERT / REPLACE / REMOVE PACEMAKER  10/2017    CRT-D upgrade    TUBAL LIGATION      Family history:  Heart disease, high blood pressure, high cholesterol, asthma, seizures, stroke, diabetes, thyroid disease, arthritis, anemia  Allergies:  Aspirin, Imitrex, NSAIDs, penicillin, shellfish, Percocet, Reglan  Habits:  2 caffeinated drinks per day  Review of Systems   Ears: Positive for dizziness, head trauma and family history of hearing loss.    Nose:  Positive for nosebleeds and postnasal drip.    Constitutional: Positive for fever, chills and night sweats.    Cardiovascular:  Positive for chest pain and history of high blood pressure.   Gastrointestinal:  Positive for acid reflux.   Other:  Positive for weakness, confusion, depression and anxiety. Negative for rash.     Current Outpatient Medications on File Prior to Visit   Medication Sig Dispense Refill    ARIPiprazole (ABILIFY) 5 MG Tab Take 1 tablet (5 mg total) by mouth every evening. 30 tablet 5    carvedilol (COREG) 25 MG tablet TAKE 1 TABLET(25 MG) BY MOUTH TWICE DAILY WITH MEALS 180 tablet 3    cetirizine (ZYRTEC) 10 MG tablet Take 1 tablet (10 mg total) by mouth once daily. 90 tablet 11    clonazePAM (KLONOPIN) 1 MG tablet Take 1 tablet (1 mg total) by mouth  daily as needed for Anxiety. 30 tablet 5    clopidogrel (PLAVIX) 75 mg tablet Take 1 tablet (75 mg total) by mouth once daily. 90 tablet 2    diphth,pertus,acell,,tetanus (BOOSTRIX) 2.5-8-5 Lf-mcg-Lf/0.5mL Susp Inject 0.5 ml into the muscle once. 0.5 mL 0    donepezil (ARICEPT) 10 MG tablet Take 1 tablet (10 mg total) by mouth 2 (two) times daily. 180 tablet 3    DULoxetine (CYMBALTA) 60 MG capsule Take 1 capsule (60 mg total) by mouth 2 (two) times daily. 60 capsule 5    erenumab-aooe 70 mg/mL AtIn Inject 70 mg into the skin every 30 days. 1 mL 6    gabapentin (NEURONTIN) 300 MG capsule Take 3 capsules (900 mg total) by mouth 3 (three) times daily. 810 capsule 12    hydrOXYzine (ATARAX) 50 MG tablet Take 1 tablet (50 mg total) by mouth nightly as needed. 30 tablet 5    LORazepam (ATIVAN) 1 MG tablet Take 1 tablet (1 mg total) by mouth as needed for Anxiety. 1 tablet 0    losartan (COZAAR) 50 MG tablet Take 1 tablet (50 mg total) by mouth once daily. 90 tablet 3    magnesium 200 mg Tab Take 200 mg by mouth once daily. (Patient taking differently: Take 200 mg by mouth every other day. ) 30 each 12    meloxicam (MOBIC) 15 MG tablet Take 1 tablet (15 mg total) by mouth once daily. 30 tablet 3    ondansetron (ZOFRAN-ODT) 4 MG TbDL Take 1 tablet (4 mg total) by mouth every 12 (twelve) hours as needed (nausea). 10 tablet 1    pantoprazole (PROTONIX) 40 MG tablet Take 1 tablet (40 mg total) by mouth once daily. 30 tablet 11    rivaroxaban (XARELTO) 20 mg Tab Take 1 tablet (20 mg total) by mouth daily with dinner or evening meal. 30 tablet 11    rosuvastatin (CRESTOR) 40 MG Tab Take 1 tablet (40 mg total) by mouth every evening. 90 tablet 3    tiaGABine (GABITRIL) 4 MG tablet Take 1 tablet (4 mg total) by mouth every evening. 30 tablet 12    triamcinolone acetonide 0.1% (KENALOG) 0.1 % cream AAA bid to rash up to 2 weeks 60 g 1    zolpidem (AMBIEN CR) 12.5 MG CR tablet TAKE 1 TABLET BY MOUTH NIGHTLY AS  NEEDED FOR INSOMNIA 30 tablet 0    topiramate (TOPAMAX) 100 MG tablet Take 2 tablets (200 mg total) by mouth 2 (two) times daily. 120 tablet 12    [DISCONTINUED] atorvastatin (LIPITOR) 40 MG tablet Take 1 tablet (40 mg total) by mouth every morning. 90 tablet 3    [DISCONTINUED] carvedilol (COREG) 25 MG tablet TAKE 1 TABLET(25 MG) BY MOUTH TWICE DAILY WITH MEALS 180 tablet 1     Current Facility-Administered Medications on File Prior to Visit   Medication Dose Route Frequency Provider Last Rate Last Dose    ketorolac injection 60 mg  60 mg Intramuscular 1 time in Clinic/HOD David Cortez MD        onabotulinumtoxina injection 200 Units  200 Units Intramuscular Q90 Days David Cortez MD   200 Units at 05/16/18 1107    onabotulinumtoxina injection 200 Units  200 Units Intramuscular Q90 Days David Cortez MD   200 Units at 08/02/18 1112     Medical problem list is quite extensive and includes epistaxis, hemispheric carotid artery syndrome, TIA, left atrial enlargement, depression, history of CVA, hiatal hernia, GERD, cervicalgia, occipital neuralgia, chronic migraine, obesity T, history of cardiac arrest, biventricular automatic implantable cardioverter-defibrillator in situ, convulsion, supra orbital neuralgia, anxiety, 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        Objective:     Blood pressure 112/70 pulse 63 height 5 ft 4 in weight 282 lb  General:  Alert and oriented lady in no acute distress  Physical Exam   Constitutional: She is oriented to person, place, and time. She appears well-developed and well-nourished.   HENT:   Head: Normocephalic.   Right Ear: Tympanic membrane and external ear normal. No drainage. No foreign bodies. No mastoid tenderness. Tympanic membrane is not perforated.  No decreased hearing is noted.   Left Ear: Tympanic membrane and external ear normal. No drainage. No foreign bodies. No mastoid tenderness. Tympanic membrane is not perforated. No decreased hearing is noted.   Ears:    Nose: Nose normal. No nasal deformity, septal deviation or nasal septal hematoma. No epistaxis. Right sinus exhibits no maxillary sinus tenderness and no frontal sinus tenderness. Left sinus exhibits no maxillary sinus tenderness and no frontal sinus tenderness.       Mouth/Throat: Uvula is midline, oropharynx is clear and moist and mucous membranes are normal. No oral lesions. No trismus in the jaw. No uvula swelling. No oropharyngeal exudate or tonsillar abscesses.       Neck: Neck supple. No tracheal deviation present. No thyromegaly present.   Pulmonary/Chest: Effort normal. No stridor.   Lymphadenopathy:     She has no cervical adenopathy.   Neurological: She is alert and oriented to person, place, and time. She displays weakness.   Skin: No rash noted.       Assessment:       1. Right-sided epistaxis        Plan:     Pt. reassured; no obvious cause of bleeding identified   Written nosebleed instructions provided  To MATHEW for significant bleeding  Piece of Surgicel provided for minor anterior naswal bleeding  Monitor blood pressure/blood work  Consider electrocautery for specific bleeding site/lesion per Dr. CHRISTINE Martinez pending course  Rx for 2% mupirocin ointment provided( may us Bacitracin) ; apply to nasal vestibules nightly x 2-3 weeks  TMJ otalgia literatrure provied

## 2018-08-22 ENCOUNTER — PATIENT MESSAGE (OUTPATIENT)
Dept: CARDIOLOGY | Facility: CLINIC | Age: 53
End: 2018-08-22

## 2018-08-22 ENCOUNTER — TELEPHONE (OUTPATIENT)
Dept: ELECTROPHYSIOLOGY | Facility: CLINIC | Age: 53
End: 2018-08-22

## 2018-08-22 NOTE — TELEPHONE ENCOUNTER
Spoke to Ms. Chawla to check on her.  She was seen by ENT and no obvious cause for bleed identified.  She states she resumed her Xarelto on Sunday this past week.    Let pt know to call us with any additional conerns.  Pt verbalizes understanding and appreciates call.

## 2018-08-22 NOTE — TELEPHONE ENCOUNTER
----- Message from Kell Woo RN sent at 8/17/2018 10:04 AM CDT -----  Check on pt - had stopped Xarelto due to Epistaxis     Seeing ENT on 8/21

## 2018-08-27 ENCOUNTER — TELEPHONE (OUTPATIENT)
Dept: CARDIAC REHAB | Facility: CLINIC | Age: 53
End: 2018-08-27

## 2018-08-27 DIAGNOSIS — G47.00 INSOMNIA, UNSPECIFIED TYPE: ICD-10-CM

## 2018-08-27 RX ORDER — ZOLPIDEM TARTRATE 12.5 MG/1
TABLET, FILM COATED, EXTENDED RELEASE ORAL
Qty: 30 TABLET | Refills: 0 | Status: SHIPPED | OUTPATIENT
Start: 2018-08-27 | End: 2018-11-06 | Stop reason: SDUPTHER

## 2018-08-29 ENCOUNTER — HOME CARE VISIT (OUTPATIENT)
Dept: NEUROLOGY | Facility: HOSPITAL | Age: 53
End: 2018-08-29

## 2018-08-29 VITALS
OXYGEN SATURATION: 97 % | SYSTOLIC BLOOD PRESSURE: 136 MMHG | HEART RATE: 66 BPM | RESPIRATION RATE: 20 BRPM | DIASTOLIC BLOOD PRESSURE: 78 MMHG

## 2018-08-29 NOTE — PROGRESS NOTES
AAOx3. Denies discomfort.  Lives w/ spouse who was not present for visit.  NIHSS-0 does not smoke; compliant w/ all meds; walks for exercise; does not smoke; refrains from adding salt, salty and fast food.  Education provided on goal of stroke mobile, s/s of stroke, diet, exercise, medications.  Caregiver verbalized understanding.  Encouraged to utilize stroke team for any concern.

## 2018-08-31 ENCOUNTER — EXTERNAL CHRONIC CARE MANAGEMENT (OUTPATIENT)
Dept: PRIMARY CARE CLINIC | Facility: CLINIC | Age: 53
End: 2018-08-31
Payer: MEDICARE

## 2018-08-31 ENCOUNTER — HOSPITAL ENCOUNTER (OUTPATIENT)
Dept: SLEEP MEDICINE | Facility: OTHER | Age: 53
Discharge: HOME | End: 2018-08-31
Attending: NURSE PRACTITIONER
Payer: MEDICARE

## 2018-08-31 DIAGNOSIS — G47.30 SLEEP APNEA, UNSPECIFIED TYPE: ICD-10-CM

## 2018-08-31 DIAGNOSIS — G47.33 OSA (OBSTRUCTIVE SLEEP APNEA): ICD-10-CM

## 2018-08-31 PROCEDURE — 95810 POLYSOM 6/> YRS 4/> PARAM: CPT | Mod: 26,,, | Performed by: INTERNAL MEDICINE

## 2018-08-31 PROCEDURE — 99490 CHRNC CARE MGMT STAFF 1ST 20: CPT | Mod: S$PBB,,, | Performed by: PSYCHIATRY & NEUROLOGY

## 2018-08-31 PROCEDURE — 95810 POLYSOM 6/> YRS 4/> PARAM: CPT

## 2018-08-31 PROCEDURE — 99490 CHRNC CARE MGMT STAFF 1ST 20: CPT | Mod: PBBFAC | Performed by: PSYCHIATRY & NEUROLOGY

## 2018-09-01 NOTE — PROGRESS NOTES
A PSG ws preformed on [facility] on the night of 8/31/18. The procedure was explained to the patient in great detail, which included the function of all the wires, when and why the tech would need to enter the room and the possibility of being placed on cpap during the night if criteria were meet, which was all discussed prior to the start of the study. All questions were asked and answered prior to the setup. The patient did not meet split night criteria.    A small number of sleep disordered breathing events were noted during the night. Snoring was noted to be mild to moderate causing a few arousals. PLM's appeared with arousals, position changes. The EKG appeared to have shown NSR with frequent PVC's and a run of what appeared to be V tach with no increase in heart rate. The patient has a 3 lead cardiac defibrillator implanted. The lowest spo2 seen was 89% All sleep stages were reached during the night. . There was a problem with the thorax belt during the night, especially when the patient moved or turn to her left side. The belt was changed, connections checked and repositioned multiple times during the night. The patient had to be encouraged to sleep supine. An end of the night instruction sheet was giving to the patient upon leaving the lab.

## 2018-09-03 PROBLEM — E04.1 THYROID NODULE: Status: ACTIVE | Noted: 2018-09-03

## 2018-09-13 NOTE — PROCEDURES
"See imported Sleep Study result in "Chart Review" under the "Media tab".     (This Sleep Study was interpreted by a Board Eligible / Certified Sleep Specialist who conducted an epoch-by-epoch review of the entire raw data recording.)    (The indication for this sleep study was reviewed and deemed appropriate by [facility] Practice Parameters or other reasons by a Board Certified Sleep Specialist.)    "

## 2018-09-14 ENCOUNTER — TELEPHONE (OUTPATIENT)
Dept: SLEEP MEDICINE | Facility: CLINIC | Age: 53
End: 2018-09-14

## 2018-09-14 NOTE — TELEPHONE ENCOUNTER
Lorena Kaufman NP pt    08/31/2018 PSG results available. Schedule for follow-up with VISHNU Kaufman NP to review results.

## 2018-09-25 ENCOUNTER — OFFICE VISIT (OUTPATIENT)
Dept: INTERNAL MEDICINE | Facility: CLINIC | Age: 53
DRG: 057 | End: 2018-09-25
Payer: MEDICARE

## 2018-09-25 ENCOUNTER — OFFICE VISIT (OUTPATIENT)
Dept: SLEEP MEDICINE | Facility: CLINIC | Age: 53
DRG: 057 | End: 2018-09-25
Payer: MEDICARE

## 2018-09-25 ENCOUNTER — TELEPHONE (OUTPATIENT)
Dept: PHARMACY | Facility: CLINIC | Age: 53
End: 2018-09-25

## 2018-09-25 VITALS
DIASTOLIC BLOOD PRESSURE: 70 MMHG | WEIGHT: 283.75 LBS | HEART RATE: 68 BPM | BODY MASS INDEX: 48.44 KG/M2 | HEIGHT: 64 IN | SYSTOLIC BLOOD PRESSURE: 110 MMHG

## 2018-09-25 VITALS
DIASTOLIC BLOOD PRESSURE: 76 MMHG | OXYGEN SATURATION: 99 % | SYSTOLIC BLOOD PRESSURE: 134 MMHG | BODY MASS INDEX: 48.93 KG/M2 | TEMPERATURE: 98 F | WEIGHT: 285.06 LBS | HEART RATE: 66 BPM

## 2018-09-25 DIAGNOSIS — R10.9 LEFT FLANK PAIN: Primary | ICD-10-CM

## 2018-09-25 DIAGNOSIS — I10 ESSENTIAL HYPERTENSION: ICD-10-CM

## 2018-09-25 DIAGNOSIS — E66.01 OBESITY, CLASS III, BMI 40-49.9 (MORBID OBESITY): ICD-10-CM

## 2018-09-25 DIAGNOSIS — G47.33 OBSTRUCTIVE SLEEP APNEA: Primary | ICD-10-CM

## 2018-09-25 DIAGNOSIS — M62.830 MUSCLE SPASM OF BACK: ICD-10-CM

## 2018-09-25 DIAGNOSIS — Z86.73 HISTORY OF CVA (CEREBROVASCULAR ACCIDENT): ICD-10-CM

## 2018-09-25 LAB
BILIRUB SERPL-MCNC: ABNORMAL MG/DL
BLOOD URINE, POC: ABNORMAL
COLOR, POC UA: YELLOW
GLUCOSE UR QL STRIP: NORMAL
KETONES UR QL STRIP: ABNORMAL
LEUKOCYTE ESTERASE URINE, POC: ABNORMAL
NITRITE, POC UA: ABNORMAL
PH, POC UA: 5
PROTEIN, POC: ABNORMAL
SPECIFIC GRAVITY, POC UA: 1.02
UROBILINOGEN, POC UA: NORMAL

## 2018-09-25 PROCEDURE — 99999 PR PBB SHADOW E&M-EST. PATIENT-LVL V: CPT | Mod: PBBFAC,,, | Performed by: NURSE PRACTITIONER

## 2018-09-25 PROCEDURE — 99215 OFFICE O/P EST HI 40 MIN: CPT | Mod: PBBFAC | Performed by: NURSE PRACTITIONER

## 2018-09-25 PROCEDURE — 99214 OFFICE O/P EST MOD 30 MIN: CPT | Mod: S$PBB,,, | Performed by: NURSE PRACTITIONER

## 2018-09-25 PROCEDURE — 99215 OFFICE O/P EST HI 40 MIN: CPT | Mod: PBBFAC,27 | Performed by: NURSE PRACTITIONER

## 2018-09-25 PROCEDURE — 81002 URINALYSIS NONAUTO W/O SCOPE: CPT | Mod: PBBFAC | Performed by: NURSE PRACTITIONER

## 2018-09-25 PROCEDURE — 99213 OFFICE O/P EST LOW 20 MIN: CPT | Mod: S$PBB,,, | Performed by: NURSE PRACTITIONER

## 2018-09-25 RX ORDER — BACLOFEN 20 MG/1
20 TABLET ORAL 3 TIMES DAILY
Qty: 30 TABLET | Refills: 0 | Status: SHIPPED | OUTPATIENT
Start: 2018-09-25 | End: 2018-10-09

## 2018-09-25 NOTE — PATIENT INSTRUCTIONS
- Apply warm, moist heat to affected area.  You may lay a damp washcloth against the affected area and place a heating pad on LOW over the washcloth for 15-20 minutes 3 times daily or as needed.

## 2018-09-25 NOTE — PROGRESS NOTES
Subjective:       Patient ID: Amna Chawla is a 53 y.o. female.    Chief Complaint: Back Pain    Ms. Chawla presents today for severe left sided mid-low back pain that she noticed when she awoke this morning and which has gotten worse throughout the day. She has an extensive medical history including s/p CVA and MI, chronic migraines, cognitive problems, and morbid obesity.       Back Pain   This is a new problem. The current episode started today. The problem occurs constantly. The problem has been rapidly worsening since onset. The pain is present in the lumbar spine. The quality of the pain is described as aching and stabbing. The pain does not radiate. The pain is at a severity of 10/10. The pain is severe. The symptoms are aggravated by bending, twisting and position. Stiffness is present all day. Associated symptoms include headaches. Pertinent negatives include no abdominal pain, bladder incontinence, bowel incontinence, chest pain, dysuria, fever, numbness, paresis, paresthesias, pelvic pain, tingling or weakness. She has tried NSAIDs (took Mobic, no relief. ) for the symptoms. The treatment provided no relief.     Review of Systems   Constitutional: Negative for fever.   HENT: Negative for facial swelling.    Eyes: Negative for visual disturbance.   Respiratory: Negative for shortness of breath.    Cardiovascular: Negative for chest pain.   Gastrointestinal: Negative for abdominal pain and bowel incontinence.   Genitourinary: Negative for bladder incontinence, dysuria and pelvic pain.   Musculoskeletal: Positive for back pain.   Skin: Negative for rash.   Neurological: Positive for headaches. Negative for tingling, weakness, numbness and paresthesias.   Psychiatric/Behavioral: Negative for confusion.       Objective:      Physical Exam   Constitutional: She is oriented to person, place, and time. She appears well-developed. No distress.   morbidly obese   Cardiovascular: Normal rate, regular rhythm and  normal heart sounds.   Pulmonary/Chest: Effort normal and breath sounds normal. No stridor. No respiratory distress. She has no wheezes.   Musculoskeletal:        Arms:  Neurological: She is alert and oriented to person, place, and time.   Skin: She is not diaphoretic.   Nursing note and vitals reviewed.      Assessment:       1. Left flank pain    2. Muscle spasm of back        Plan:   1. Left flank pain  - POCT urine dipstick without microscope    2. Muscle spasm of back  - baclofen (LIORESAL) 20 MG tablet; Take 1 tablet (20 mg total) by mouth 3 (three) times daily. for 10 days  Dispense: 30 tablet; Refill: 0      Pt has been given instructions populated from [employer] database and has verbalized understanding of the after visit summary and information contained wherein.    Follow up with a primary care provider. May go to ER for acute shortness of breath, lightheadedness, fever, or any other emergent complaints or changes in condition.

## 2018-09-25 NOTE — PROGRESS NOTES
"Ms. Chawla returns today regarding the management of SHIRA. She underwent recent PSG which was reviewed with her today. She continues to report mild snoring and persistent disrupted sleep. + daytime sleepiness. She has nocturia 3-4x/night. She sleeps alone so unsure if air gasping .     REVIEW OF SYSTEMS: Sleep related symptoms as per HPI.  Otherwise a balance review of 10-systems is negative.     PHYSICAL EXAM:   /70   Pulse 68   Ht 5' 4" (1.626 m)   Wt 128.7 kg (283 lb 11.7 oz)   BMI 48.70 kg/m²   GENERAL: morbid obese body habitus, well groomed     PSG AHI 6.6/low sat 88%, 30s Vpaced    ASSESSMENT:   1. SHIRA, mild. Ready to resume PAP  Medical comorbidities include: morbid obesity, HTN (optimal today), CVA, CAD    PLAN:   1. Begin APAP 6-20cm. Gregor DALLAS. RTC 4-5 wks AFTER setup adherence  2. Advised to abstain from driving should she feel sleepy or drowsy.   3. See cards regarding V-paced during sleep 30s,has upcoming appt and Echo  4. Encouraged weight loss efforts for potential improvement of SHIRA and overall health benefits    "

## 2018-09-26 ENCOUNTER — PATIENT MESSAGE (OUTPATIENT)
Dept: NEUROLOGY | Facility: CLINIC | Age: 53
End: 2018-09-26

## 2018-09-26 VITALS — SYSTOLIC BLOOD PRESSURE: 130 MMHG | DIASTOLIC BLOOD PRESSURE: 74 MMHG | HEART RATE: 68 BPM | OXYGEN SATURATION: 96 %

## 2018-09-26 NOTE — PROGRESS NOTES
AAOx3.  Lives w/ spouse who was not present for visit.  NIHSS-0; does not smoke; refrains from adding salt, salty and fast food; compliant w/ all meds; walks for exercise.  Education provided on goal of Stroke Mobile s/s of Stroke, diet, exercise, medications.  Verbalized understanding of all instructions provided.  Encouraged to utilize stroke team for any concern.

## 2018-09-28 ENCOUNTER — HOSPITAL ENCOUNTER (INPATIENT)
Facility: HOSPITAL | Age: 53
LOS: 2 days | Discharge: HOME | DRG: 057 | End: 2018-09-30
Attending: EMERGENCY MEDICINE | Admitting: PSYCHIATRY & NEUROLOGY
Payer: MEDICARE

## 2018-09-28 DIAGNOSIS — R07.9 CHEST PAIN: ICD-10-CM

## 2018-09-28 DIAGNOSIS — I10 ESSENTIAL HYPERTENSION: Primary | Chronic | ICD-10-CM

## 2018-09-28 DIAGNOSIS — I63.9 ACUTE ISCHEMIC STROKE: ICD-10-CM

## 2018-09-28 DIAGNOSIS — I63.9 STROKE: ICD-10-CM

## 2018-09-28 DIAGNOSIS — R53.1 LEFT-SIDED WEAKNESS: ICD-10-CM

## 2018-09-28 LAB
ALBUMIN SERPL BCP-MCNC: 2.5 G/DL
ALP SERPL-CCNC: 51 U/L
ALT SERPL W/O P-5'-P-CCNC: 16 U/L
ANION GAP SERPL CALC-SCNC: 5 MMOL/L
AST SERPL-CCNC: 18 U/L
BASOPHILS # BLD AUTO: 0 K/UL
BASOPHILS NFR BLD: 0 %
BILIRUB SERPL-MCNC: 0.3 MG/DL
BUN SERPL-MCNC: 9 MG/DL
CALCIUM SERPL-MCNC: 7.5 MG/DL
CHLORIDE SERPL-SCNC: 113 MMOL/L
CHOLEST SERPL-MCNC: 101 MG/DL
CHOLEST/HDLC SERPL: 2.9 {RATIO}
CO2 SERPL-SCNC: 18 MMOL/L
CREAT SERPL-MCNC: 0.7 MG/DL
CREAT SERPL-MCNC: 0.9 MG/DL (ref 0.5–1.4)
DIFFERENTIAL METHOD: ABNORMAL
EOSINOPHIL # BLD AUTO: 0 K/UL
EOSINOPHIL NFR BLD: 0 %
ERYTHROCYTE [DISTWIDTH] IN BLOOD BY AUTOMATED COUNT: 15.9 %
EST. GFR  (AFRICAN AMERICAN): >60 ML/MIN/1.73 M^2
EST. GFR  (NON AFRICAN AMERICAN): >60 ML/MIN/1.73 M^2
ESTIMATED AVG GLUCOSE: 123 MG/DL
ESTIMATED PA SYSTOLIC PRESSURE: 25.28
GLUCOSE SERPL-MCNC: 105 MG/DL
HBA1C MFR BLD HPLC: 5.9 %
HCT VFR BLD AUTO: 38.1 %
HDLC SERPL-MCNC: 35 MG/DL
HDLC SERPL: 34.7 %
HGB BLD-MCNC: 12.7 G/DL
IMM GRANULOCYTES # BLD AUTO: 0.01 K/UL
IMM GRANULOCYTES NFR BLD AUTO: 0.2 %
INR PPP: 1.2
LDLC SERPL CALC-MCNC: 56.6 MG/DL
LYMPHOCYTES # BLD AUTO: 1.2 K/UL
LYMPHOCYTES NFR BLD: 25.7 %
MCH RBC QN AUTO: 29.7 PG
MCHC RBC AUTO-ENTMCNC: 33.3 G/DL
MCV RBC AUTO: 89 FL
MONOCYTES # BLD AUTO: 0.6 K/UL
MONOCYTES NFR BLD: 12.2 %
NEUTROPHILS # BLD AUTO: 2.8 K/UL
NEUTROPHILS NFR BLD: 61.9 %
NONHDLC SERPL-MCNC: 66 MG/DL
NRBC BLD-RTO: 0 /100 WBC
PLATELET # BLD AUTO: 231 K/UL
PMV BLD AUTO: 12.5 FL
POC PTINR: 1.4 (ref 0.9–1.2)
POC PTWBT: 17 SEC (ref 9.7–14.3)
POCT GLUCOSE: 97 MG/DL (ref 70–110)
POTASSIUM SERPL-SCNC: 3.6 MMOL/L
PROT SERPL-MCNC: 7.6 G/DL
PROTHROMBIN TIME: 12 SEC
RBC # BLD AUTO: 4.27 M/UL
RETIRED EF AND QEF - SEE NOTES: 55 (ref 55–65)
SAMPLE: ABNORMAL
SAMPLE: NORMAL
SODIUM SERPL-SCNC: 136 MMOL/L
TRICUSPID VALVE REGURGITATION: NORMAL
TRIGL SERPL-MCNC: 47 MG/DL
TSH SERPL DL<=0.005 MIU/L-ACNC: 0.76 UIU/ML
WBC # BLD AUTO: 4.59 K/UL

## 2018-09-28 PROCEDURE — 25000003 PHARM REV CODE 250: Performed by: PHYSICIAN ASSISTANT

## 2018-09-28 PROCEDURE — 94761 N-INVAS EAR/PLS OXIMETRY MLT: CPT

## 2018-09-28 PROCEDURE — 93306 TTE W/DOPPLER COMPLETE: CPT | Mod: 26,,, | Performed by: INTERNAL MEDICINE

## 2018-09-28 PROCEDURE — G8997 SWALLOW GOAL STATUS: HCPCS | Mod: CH

## 2018-09-28 PROCEDURE — 25000003 PHARM REV CODE 250: Performed by: STUDENT IN AN ORGANIZED HEALTH CARE EDUCATION/TRAINING PROGRAM

## 2018-09-28 PROCEDURE — 92610 EVALUATE SWALLOWING FUNCTION: CPT

## 2018-09-28 PROCEDURE — G8996 SWALLOW CURRENT STATUS: HCPCS | Mod: CH

## 2018-09-28 PROCEDURE — 84443 ASSAY THYROID STIM HORMONE: CPT

## 2018-09-28 PROCEDURE — 82962 GLUCOSE BLOOD TEST: CPT

## 2018-09-28 PROCEDURE — A4216 STERILE WATER/SALINE, 10 ML: HCPCS | Performed by: STUDENT IN AN ORGANIZED HEALTH CARE EDUCATION/TRAINING PROGRAM

## 2018-09-28 PROCEDURE — 20600001 HC STEP DOWN PRIVATE ROOM

## 2018-09-28 PROCEDURE — 63600175 PHARM REV CODE 636 W HCPCS: Performed by: NURSE PRACTITIONER

## 2018-09-28 PROCEDURE — 80053 COMPREHEN METABOLIC PANEL: CPT

## 2018-09-28 PROCEDURE — 93005 ELECTROCARDIOGRAM TRACING: CPT

## 2018-09-28 PROCEDURE — 85610 PROTHROMBIN TIME: CPT

## 2018-09-28 PROCEDURE — 99223 1ST HOSP IP/OBS HIGH 75: CPT | Mod: AI,GC,, | Performed by: PSYCHIATRY & NEUROLOGY

## 2018-09-28 PROCEDURE — 93010 ELECTROCARDIOGRAM REPORT: CPT | Mod: ,,, | Performed by: INTERNAL MEDICINE

## 2018-09-28 PROCEDURE — 25000003 PHARM REV CODE 250: Performed by: NURSE PRACTITIONER

## 2018-09-28 PROCEDURE — 99285 EMERGENCY DEPT VISIT HI MDM: CPT | Mod: 25

## 2018-09-28 PROCEDURE — 85025 COMPLETE CBC W/AUTO DIFF WBC: CPT

## 2018-09-28 PROCEDURE — 83036 HEMOGLOBIN GLYCOSYLATED A1C: CPT

## 2018-09-28 PROCEDURE — 82565 ASSAY OF CREATININE: CPT

## 2018-09-28 PROCEDURE — 99285 EMERGENCY DEPT VISIT HI MDM: CPT | Mod: ,,, | Performed by: PHYSICIAN ASSISTANT

## 2018-09-28 PROCEDURE — 11000001 HC ACUTE MED/SURG PRIVATE ROOM

## 2018-09-28 PROCEDURE — 80061 LIPID PANEL: CPT

## 2018-09-28 PROCEDURE — 93306 TTE W/DOPPLER COMPLETE: CPT

## 2018-09-28 RX ORDER — LOSARTAN POTASSIUM 50 MG/1
50 TABLET ORAL DAILY
Status: DISCONTINUED | OUTPATIENT
Start: 2018-09-28 | End: 2018-09-30 | Stop reason: HOSPADM

## 2018-09-28 RX ORDER — TIZANIDINE 2 MG/1
4 TABLET ORAL ONCE
Status: COMPLETED | OUTPATIENT
Start: 2018-09-28 | End: 2018-09-28

## 2018-09-28 RX ORDER — BUTALBITAL, ACETAMINOPHEN AND CAFFEINE 50; 325; 40 MG/1; MG/1; MG/1
1 TABLET ORAL ONCE
Status: COMPLETED | OUTPATIENT
Start: 2018-09-28 | End: 2018-09-28

## 2018-09-28 RX ORDER — SODIUM CHLORIDE 0.9 % (FLUSH) 0.9 %
3 SYRINGE (ML) INJECTION EVERY 8 HOURS
Status: DISCONTINUED | OUTPATIENT
Start: 2018-09-28 | End: 2018-09-30 | Stop reason: HOSPADM

## 2018-09-28 RX ORDER — GABAPENTIN 300 MG/1
900 CAPSULE ORAL 3 TIMES DAILY
Status: DISCONTINUED | OUTPATIENT
Start: 2018-09-29 | End: 2018-09-30 | Stop reason: HOSPADM

## 2018-09-28 RX ORDER — METHYLPREDNISOLONE SOD SUCC 125 MG
125 VIAL (EA) INJECTION ONCE
Status: COMPLETED | OUTPATIENT
Start: 2018-09-28 | End: 2018-09-28

## 2018-09-28 RX ORDER — LANOLIN ALCOHOL/MO/W.PET/CERES
400 CREAM (GRAM) TOPICAL ONCE
Status: COMPLETED | OUTPATIENT
Start: 2018-09-28 | End: 2018-09-28

## 2018-09-28 RX ORDER — ROSUVASTATIN CALCIUM 20 MG/1
40 TABLET, COATED ORAL NIGHTLY
Status: DISCONTINUED | OUTPATIENT
Start: 2018-09-28 | End: 2018-09-30 | Stop reason: HOSPADM

## 2018-09-28 RX ORDER — ACETAMINOPHEN 325 MG/1
650 TABLET ORAL EVERY 6 HOURS PRN
Status: DISCONTINUED | OUTPATIENT
Start: 2018-09-28 | End: 2018-09-29

## 2018-09-28 RX ORDER — CLOPIDOGREL BISULFATE 75 MG/1
75 TABLET ORAL DAILY
Status: DISCONTINUED | OUTPATIENT
Start: 2018-09-28 | End: 2018-09-30 | Stop reason: HOSPADM

## 2018-09-28 RX ORDER — PANTOPRAZOLE SODIUM 40 MG/1
40 TABLET, DELAYED RELEASE ORAL DAILY
Status: DISCONTINUED | OUTPATIENT
Start: 2018-09-28 | End: 2018-09-30 | Stop reason: HOSPADM

## 2018-09-28 RX ADMIN — Medication 3 ML: at 02:09

## 2018-09-28 RX ADMIN — ACETAMINOPHEN 650 MG: 325 TABLET ORAL at 05:09

## 2018-09-28 RX ADMIN — BUTALBITAL, ACETAMINOPHEN AND CAFFEINE 1 TABLET: 50; 325; 40 TABLET ORAL at 11:09

## 2018-09-28 RX ADMIN — METHYLPREDNISOLONE SODIUM SUCCINATE 125 MG: 125 INJECTION, POWDER, FOR SOLUTION INTRAMUSCULAR; INTRAVENOUS at 11:09

## 2018-09-28 RX ADMIN — CLOPIDOGREL 75 MG: 75 TABLET, FILM COATED ORAL at 10:09

## 2018-09-28 RX ADMIN — ACETAMINOPHEN 650 MG: 325 TABLET ORAL at 11:09

## 2018-09-28 RX ADMIN — RIVAROXABAN 20 MG: 20 TABLET, FILM COATED ORAL at 05:09

## 2018-09-28 RX ADMIN — PANTOPRAZOLE SODIUM 40 MG: 40 TABLET, DELAYED RELEASE ORAL at 10:09

## 2018-09-28 RX ADMIN — TIZANIDINE 4 MG: 2 TABLET ORAL at 11:09

## 2018-09-28 RX ADMIN — MAGNESIUM OXIDE TAB 400 MG (241.3 MG ELEMENTAL MG) 400 MG: 400 (241.3 MG) TAB at 11:09

## 2018-09-28 RX ADMIN — Medication 3 ML: at 09:09

## 2018-09-28 RX ADMIN — ROSUVASTATIN CALCIUM 40 MG: 20 TABLET, FILM COATED ORAL at 09:09

## 2018-09-28 RX ADMIN — LOSARTAN POTASSIUM 50 MG: 50 TABLET ORAL at 10:09

## 2018-09-28 NOTE — ED PROVIDER NOTES
"Encounter Date: 9/28/2018       History     Chief Complaint   Patient presents with    Extremity Weakness     woke with terrible headache at 0230, took migraine med, L arm feels weak since 0630 keep dropping things, L side of face weak     8:17 AM  Patient is a 53 year old female with a hx of CVA, paroxysmal a fib, anxiety, asthma, has a pacemaker and defibrillator who presents to the ED with L upper and lower extremity weakness, facial heavihness. She states she woke up at 230 this AM with a typical headache. She took her headache medication and went back to sleep. She did not get out of the bed. When she woke up around 630, she reports noticing her symptoms because she was "stumbling". She endorses L upper and lower extremity weakness and L facial heaviness. She reports having a TIA in May 2018, but had her symptoms on the R. Her headache is typically for her.           Review of patient's allergies indicates:   Allergen Reactions    Aspirin Hives    Imitrex [sumatriptan] Palpitations    Penicillins Hives and Swelling    Shellfish containing products Anaphylaxis     seafood    Percocet [oxycodone-acetaminophen] Itching     States can take    Reglan [metoclopramide hcl] Other (See Comments)     Parkinsonism      Past Medical History:   Diagnosis Date    Anticoagulant long-term use     Anxiety     Asthma     Atrial fibrillation     Brain anoxic injury     Defibrillator activation 2013    Depression     Heart block     History of sudden cardiac arrest 2/2013    PEA arrest with subsequent long-QT    Hx of psychiatric care     Hypertension     Left atrial enlargement 4/11/2018    Pacemaker 2013    Psychiatric problem     Seizures     Sleep difficulties     Stroke     weakness lt side    Therapy     Thyroid disease      Past Surgical History:   Procedure Laterality Date    APPENDECTOMY      breast reduction      BREAST SURGERY      CARDIAC DEFIBRILLATOR PLACEMENT      CARDIAC DEFIBRILLATOR " PLACEMENT      CARPAL TUNNEL RELEASE Right     colon  N/A 6/24/2014    Performed by [facility]ante [facility] at [facility] ENDO (2ND FLR)    COSMETIC SURGERY      egd N/A 6/24/2014    Performed by [facility] at [facility] (2ND FLR)    HERNIA REPAIR      HIATAL HERNIA REPAIR      INSERT / REPLACE / REMOVE PACEMAKER  10/2017    CRT-D upgrade    INSERTION, CARDIAC PACEMAKER, DUAL CHAMBER N/A 2/15/2013    Performed by Humberto Martins MD at [facility] CATH LAB    INSERTION-ICD-BIVENTRICULAR N/A 10/13/2017    Performed by Avelino Mejia [facility] at [facility] CATH LAB    RELEASE-CARPAL TUNNEL Left 2/3/2017    Performed by Matt Pugh Jr., [facility] at [facility] [place], operated by [facility] OR    RELEASE-CARPAL TUNNEL Right 12/9/2016    Performed by Matt Pugh Jr., MD at [facility] [place], [facility] OR    TUBAL LIGATION      VENOGRAM N/A 10/13/2017    Performed by Avelino Mejia [facility] at [facility] CATH LAB     Family History   Problem Relation Age of Onset    Hypertension Mother     COPD Unknown     Heart failure Unknown     Glaucoma Maternal Grandmother     Glaucoma Paternal Grandmother      Social History     Tobacco Use    Smoking status: Never Smoker    Smokeless tobacco: Never Used   Substance Use Topics    Alcohol use: No    Drug use: No     Review of Systems   Constitutional: Negative for fever.   HENT: Negative for sore throat.    Respiratory: Negative for shortness of breath.    Cardiovascular: Negative for chest pain.   Gastrointestinal: Negative for nausea.   Genitourinary: Negative for dysuria.   Musculoskeletal: Negative for back pain.   Skin: Negative for rash.   Neurological: Positive for weakness, numbness and headaches.   Hematological: Does not bruise/bleed easily.       Physical Exam     Initial Vitals [09/28/18 0803]   BP Pulse Resp Temp SpO2   (!) 200/122 65 18 98.4 °F (36.9 °C) 99 %      MAP       --         Physical Exam    Nursing note and vitals reviewed.  Constitutional: She appears well-developed and well-nourished. She is not diaphoretic. No distress.   HENT:    Head: Normocephalic and atraumatic.   Nose: Nose normal.   Eyes: Conjunctivae and EOM are normal.   Neck: Normal range of motion.   Cardiovascular: Normal rate, regular rhythm and normal heart sounds. Exam reveals no friction rub.    No murmur heard.  Pulmonary/Chest: Breath sounds normal. No respiratory distress. She has no wheezes. She has no rales.   Abdominal: Soft. Bowel sounds are normal. She exhibits no distension. There is no tenderness. There is no rebound.   Musculoskeletal: Normal range of motion.   Neurological: She is alert and oriented to person, place, and time. She has normal strength. No sensory deficit.   Decrease sensations to L side of face and L upper and lower extremities.  L extremities with decrease ROM and strength when compared to the right. Poor finger  and finger to nose.  Answering questions in clear and full sentences.    Skin: Skin is warm and dry. No erythema. No pallor.   Psychiatric: She has a normal mood and affect. Her behavior is normal. Judgment and thought content normal.         ED Course   Procedures  Labs Reviewed   CBC W/ AUTO DIFFERENTIAL - Abnormal; Notable for the following components:       Result Value    RDW 15.9 (*)     All other components within normal limits   COMPREHENSIVE METABOLIC PANEL - Abnormal; Notable for the following components:    Chloride 113 (*)     CO2 18 (*)     Calcium 7.5 (*)     Albumin 2.5 (*)     Alkaline Phosphatase 51 (*)     Anion Gap 5 (*)     All other components within normal limits   LIPID PANEL - Abnormal; Notable for the following components:    Cholesterol 101 (*)     HDL 35 (*)     LDL Cholesterol 56.6 (*)     All other components within normal limits   HEMOGLOBIN A1C - Abnormal; Notable for the following components:    Hemoglobin A1C 5.9 (*)     All other components within normal limits    Narrative:     ADD ON A1C ORDER #[id number] PER DR VIKY LYNCH 09/28/2018    14:51    ISTAT PROCEDURE - Abnormal; Notable for the  following components:    POC PTWBT 17.0 (*)     POC PTINR 1.4 (*)     All other components within normal limits   PROTIME-INR   TSH   HEMOGLOBIN A1C   URINALYSIS   POCT GLUCOSE   ISTAT CREATININE   POCT GLUCOSE        ECG Results          EKG 12-lead (Final result)  Result time 09/28/18 16:37:38    Final result by Interface, Lab In [facility] (09/28/18 16:37:38)                 Narrative:    Test Reason : I63.9  Blood Pressure : 178/087 mmHG  Vent. Rate : 060 BPM     Atrial Rate : 060 BPM     P-R Int : 164 ms          QRS Dur : 102 ms      QT Int : 478 ms       P-R-T Axes : 080 039 082 degrees     QTc Int : 478 ms    Dual-chamber pacemaker (DDD), normally functioning  Abnormal ECG  When compared with ECG of 28-SEP-2018 08:26,  No significant change was found  Confirmed by ABBEY MCDONALD MD ([id number]) on 9/28/2018 4:37:29 PM    Referred By: [email or web address]   SELF           Confirmed By:ABBEY MCDONALD MD                             ECG 12 lead (Final result)  Result time 09/28/18 16:35:05    Final result by Interface, Lab In [facility] (09/28/18 16:35:05)                 Narrative:    Test Reason : I63.9,  Blood Pressure : 172/117 mmHG  Vent. Rate : 062 BPM     Atrial Rate : 062 BPM     P-R Int : 182 ms          QRS Dur : 094 ms      QT Int : 460 ms       P-R-T Axes : 067 -03 105 degrees     QTc Int : 466 ms    Dual-chamber pacemaker (DDD), normally functioning  When compared with ECG of 21-AUG-2018 09:19,  No significant change was found  Confirmed by ABBEY MCDONALD MD ([id number]) on 9/28/2018 4:34:51 PM    Referred By: [email or web address]   SELF           Confirmed By:ABBEY MCDONALD MD                            Imaging Results          X-Ray Chest AP Portable (Final result)  Result time 09/28/18 12:02:20    Final result by Gigi Carroll MD (09/28/18 12:02:20)                 Impression:      No detrimental change or radiographic acute intrathoracic process seen.      Electronically signed by: Gigi Carroll MD  Date:    09/28/2018  Time:    12:02              Narrative:    EXAMINATION:  XR CHEST AP PORTABLE    CLINICAL HISTORY:  Stroke;    TECHNIQUE:  Single frontal view of the chest was performed.    COMPARISON:  Chest radiograph 04/11/2018    FINDINGS:  Monitoring leads overlie the chest.  Large body habitus.  Left upper chest multi lead cardiac device is unchanged.  Cardiomediastinal silhouette is stable without evidence of failure.  The lungs are well expanded without focal consolidation, pleural effusion or pneumothorax.  No acute osseous process seen.  PA and lateral views can be obtained.                               CT Head Without Contrast (Final result)  Result time 09/28/18 09:05:09    Final result by Neil Shine MD (09/28/18 09:05:09)                 Impression:      No evidence of acute hemorrhage or major vascular distribution infarct.    Small remote right cerebellar infarct.    Empty sella configuration.    Electronically signed by resident: Cami Key MD  Date:    09/28/2018  Time:    08:37    Electronically signed by: Neil Shine MD  Date:    09/28/2018  Time:    09:05             Narrative:    EXAMINATION:  CT HEAD WITHOUT CONTRAST    CLINICAL HISTORY:  Focal neuro deficit, new, fixed or worsening, <6 hours;    TECHNIQUE:  Low dose axial CT images obtained throughout the head without intravenous contrast. Sagittal and coronal reconstructions were performed.    COMPARISON:  CT head 04/11/2018, CTA head and neck 04/12/2018.    FINDINGS:  Intracranial compartment:    Ventricles and sulci are normal in size for age without evidence of hydrocephalus.    No extra-axial blood or fluid collections.    Small remote right cerebellar infarct.  No new parenchymal mass, hemorrhage, edema or major vascular distribution infarct.    Empty sella configuration.    Skull/extracranial contents (limited evaluation):    No fracture. Mastoid air cells and paranasal sinuses are essentially clear.                                 Medical Decision Making:   History:    Old Medical Records: I decided to obtain old medical records.  Clinical Tests:   Lab Tests: Reviewed and Ordered  Radiological Study: Ordered and Reviewed  Medical Tests: Ordered and Reviewed       APC / Resident Notes:   8:17 AM  Stroke notified.     CT head without contrast shows no evidence of acute hemorrhage or major vascular distribution infarct. Patient's symptoms still concerning for CVA. Will defer advance imaging decisions to the Stroke team who are at bedside evaluating patient and will admit for further care.           Katelynn Allen PA-C  Emergent Department  [facility]ralink #[id number] or #[id number]           Attending Attestation:     Physician Attestation Statement for NP/PA:   I discussed this assessment and plan of this patient with the NP/PA, but I did not personally examine the patient. The face to face encounter was performed by the NP/PA.                     Clinical Impression:   The primary encounter diagnosis was Left-sided weakness. Diagnoses of Stroke and Acute ischemic stroke were also pertinent to this visit.      Disposition:   Disposition: Admitted  Condition: Serious                        Katelynn Allen PA-C  09/29/18 1244       Radhames Lazcano MD  10/01/18 1140

## 2018-09-28 NOTE — HPI
"Mrs. Chawla is 53 y.o. female with co-morbidities including: HTN (on lisinopril and losartan), HLD (on rosuvastatin), CAD, CVA, seizures, a fib (on Xarelto and Plavix), pacemaker in place presented to the ED with L upper and lower extremity weakness and facial heavihness. She states she woke up at 0230 this AM with a severe R sided HA. She took her headache medication and went back to sleep. She did not get out of the bed till 0630, she reports noticing her symptoms because she was "stumbling". She endorses L upper and lower extremity weakness and L facial heaviness. She reports having a TIA in May 2018, but had her symptoms on the R. She had hx of cardiac arrest and CVA on 2013 with L sided weakness but no residual deficits. CTH at ED showed no evidence of acute hemorrhage or major vascular distribution infarct, Small remote right cerebellar infarct was noticed. She reports that she is complaint with her medications and never missed a dose. Patient admitted to stroke service for physical therapy evaluation and close observation.      "

## 2018-09-28 NOTE — PLAN OF CARE
Problem: SLP Goal  Goal: SLP Goal  Speech Language Pathology Goals  Goals expected to be met by 10/5  1. Patient will tolerate regular consistency diet and thin liquids with no overt signs of airway compromise.   2. Patient will participate in full speech, language, and cognitive evaluation to determine possible further therapeutic interventions.       Bedside Swallow Assessment completed with implemented plan of care. ST recommending regular consistency diet and thin liquids.   Bethany Ray M.S., [facility]-SLP  Speech Language Pathologist  [phone]  09/28/2018

## 2018-09-28 NOTE — ASSESSMENT & PLAN NOTE
-she is on Xarelto and Plavix at home  -she is complaint with med  -resume for now as there is no evidence of ICH

## 2018-09-28 NOTE — ASSESSMENT & PLAN NOTE
-she had a hx of CVA with L sided weakness on 2013.  -no residual deficits since.  -she reports hx of TIA and R sided weakness on May 2018.  -she is compliant with anticoagulation meds at home.

## 2018-09-28 NOTE — ASSESSMENT & PLAN NOTE
"She presented to the ED with L upper and lower extremity weakness and facial heavihness. She states she woke up at 0230 this AM with a severe R sided HA. She took her headache medication and went back to sleep. She did not get out of the bed till 0630, she reports noticing her symptoms because she was "stumbling". She endorses L upper and lower extremity weakness and L facial heaviness. CTH at ED showed no evidence of acute hemorrhage or major vascular distribution infarct. Patient admitted to stroke service for physical therapy evaluation and close observation.  PLAN:  -tight control of BP.  -f/u PT/OT recs.  "

## 2018-09-28 NOTE — ED NOTES
The patient came to the ER today with c/o a headache that began at 0230 this am. States she noticed left sided weakness at 0630. States she 'stumbled'. At 0230, the pt never got out of bed, so is unsure if weakness was present at 0230. Reports hx of htn and migraines, reports TIA in may. Pt reports left sided weakness and also states her face was droopy on the left. LKN at 0230. Pt has a pacemaker/defibrillator so cannot have MRI. Pt had prior cardiac arrest in 2013. Had had mca stroke in April. Pt is on Xarelto and Plavix

## 2018-09-28 NOTE — H&P
"[facility]  Vascular Neurology  Comprehensive Stroke Center  History & Physical    Consults  Assessment/Plan:     Patient is a 53 y.o. year old female with:    Left-sided weakness    She presented to the ED with L upper and lower extremity weakness and facial heavihness. She states she woke up at 0230 this AM with a severe R sided HA. She took her headache medication and went back to sleep. She did not get out of the bed till 0630, she reports noticing her symptoms because she was "stumbling". She endorses L upper and lower extremity weakness and L facial heaviness. CTH at ED showed no evidence of acute hemorrhage or major vascular distribution infarct. Patient admitted to stroke service for physical therapy evaluation and close observation.  PLAN:  -tight control of BP.  -f/u PT/OT recs.        Long term (current) use of anticoagulants    -she is on Xarelto and Plavix at home  -she is complaint with med  -resume for now as there is no evidence of ICH        Essential hypertension    -resume home dose lisinopril  -continue monitoring BP          History of CVA (cerebrovascular accident)    -she had a hx of CVA with L sided weakness on 2013.  -no residual deficits since.  -she reports hx of TIA and R sided weakness on May 2018.  -she is compliant with anticoagulation meds at home.        HLD (hyperlipidemia)    -resume rosuvastatin tablet 40 mg.            STROKE DOCUMENTATION     Acute Stroke Times   Last Known Normal Time: 0230  Symptom Onset Date: 09/28/18  Symptom Onset Time: (unknown)  Stroke Team Called Date: 09/28/18  Stroke Team Called Time: 0813  Stroke Team Arrival Date: 09/28/18  Stroke Team Arrival Time: 0820  CT Interpretation Time: 0821    NIH Scale:  1a. Level Of Consciousness: 0-->Alert: keenly responsive  1b. LOC Questions: 0-->Answers both questions correctly  1c. LOC Commands: 0-->Performs both tasks correctly  2. Best Gaze: 0-->Normal  3. Visual: 0-->No visual loss  4. Facial " "Palsy: 0-->Normal symmetrical movements  5a. Motor Arm, Left: 1-->Drift: limb holds 90 (or 45) degrees, but drifts down before full 10 seconds: does not hit bed or other support  5b. Motor Arm, Right: 0-->No drift: limb holds 90 (or 45) degrees for full 10 secs  6a. Motor Leg, Left: 1-->Drift: leg falls by the end of the 5-sec period but does not hit bed  6b. Motor Leg, Right: 0-->No drift: leg holds 30 degree position for full 5 secs  7. Limb Ataxia: 0-->Absent  8. Sensory: 0-->Normal: no sensory loss  9. Best Language: 0-->No aphasia: normal  10. Dysarthria: 0-->Normal  11. Extinction and Inattention (formerly Neglect): 0-->No abnormality  Total (NIH Stroke Scale): 2     Modified [place]    Ciales Coma Scale:    ABCD2 Score:    [id number] Score:   HAS -BLED Score:   ICH Score:   Hunt & Hearn Classification:      Thrombolysis Candidate? No.      Interventional Revascularization Candidate?   Is the patient eligible for mechanical endovascular reperfusion (BRYAN)?  No; No large vessel occlusion    Hemorrhagic change of an Ischemic Stroke: Does this patient have an ischemic stroke with hemorrhagic changes? No         Subjective:     History of Present Illness:  Mrs. Chawla is 53 y.o. female with co-morbidities including: HTN (on lisinopril and losartan), HLD (on rosuvastatin), CAD, CVA, seizures, a fib (on Xarelto and Plavix), pacemaker in place presented to the ED with L upper and lower extremity weakness and facial heavihness. She states she woke up at 0230 this AM with a severe R sided HA. She took her headache medication and went back to sleep. She did not get out of the bed till 0630, she reports noticing her symptoms because she was "stumbling". She endorses L upper and lower extremity weakness and L facial heaviness. She reports having a TIA in May 2018, but had her symptoms on the R. She had hx of cardiac arrest and CVA on 2013 with L sided weakness but no residual deficits. CTH at ED showed no evidence of " acute hemorrhage or major vascular distribution infarct, Small remote right cerebellar infarct was noticed. She reports that she is complaint with her medications and never missed a dose. Patient admitted to stroke service for physical therapy evaluation and close observation.            Past Medical History:   Diagnosis Date    Anxiety     Asthma     Atrial fibrillation     Brain anoxic injury     Defibrillator activation 2013    Depression     Heart block     History of sudden cardiac arrest 2/2013    PEA arrest with subsequent long-QT    Hx of psychiatric care     Hypertension     Left atrial enlargement 4/11/2018    Pacemaker 2013    Psychiatric problem     Seizures     Sleep difficulties     Stroke     weakness lt side    Therapy      Past Surgical History:   Procedure Laterality Date    breast reduction      BREAST SURGERY      CARDIAC DEFIBRILLATOR PLACEMENT      CARDIAC DEFIBRILLATOR PLACEMENT      CARPAL TUNNEL RELEASE Right     colon  N/A 6/24/2014    Performed [facility] ENDO (2ND FLR)    COSMETIC SURGERY      egd N/A 6/24/2014    Performed by [facility] at [facility] ENDO (2ND FLR)    HERNIA REPAIR      HIATAL HERNIA REPAIR      INSERT / REPLACE / REMOVE PACEMAKER  10/2017    CRT-D upgrade    INSERTION, CARDIAC PACEMAKER, DUAL CHAMBER N/A 2/15/2013    Performed by Humberto Martins MD at [facility] CATH LAB    INSERTION-ICD-BIVENTRICULAR N/A 10/13/2017    Performed by Avelino Mejia MD at [facility] CATH LAB    RELEASE-CARPAL TUNNEL Left 2/3/2017    [facility]    RELEASE-CARPAL TUNNEL Right 12/9/2016    [facility] at [facility] OR    TUBAL LIGATION      VENOGRAM N/A 10/13/2017    Performed by [facility]ia [facility] at [facility] CATH LAB     Family History   Problem Relation Age of Onset    Hypertension Mother     COPD Unknown     Heart failure Unknown     Glaucoma Maternal Grandmother     Glaucoma Paternal  Grandmother      Social History     Tobacco Use    Smoking status: Never Smoker    Smokeless tobacco: Never Used   Substance Use Topics    Alcohol use: No    Drug use: No     Review of patient's allergies indicates:   Allergen Reactions    Aspirin Hives    Imitrex [sumatriptan] Palpitations    Penicillins Hives and Swelling    Shellfish containing products Anaphylaxis     seafood    Percocet [oxycodone-acetaminophen] Itching     States can take    Reglan [metoclopramide hcl] Other (See Comments)     Parkinsonism        Medications: I have reviewed the current medication administration record.      (Not in a hospital admission)    Review of Systems   Constitutional: Negative for appetite change, chills and unexpected weight change.   HENT: Negative for congestion, hearing loss, nosebleeds and rhinorrhea.    Eyes: Negative for photophobia, discharge and visual disturbance.   Respiratory: Negative for cough and shortness of breath.    Cardiovascular: Negative for chest pain, palpitations and leg swelling.   Gastrointestinal: Negative for abdominal pain, nausea and vomiting.   Endocrine: Negative for polydipsia and polyphagia.   Genitourinary: Negative for dysuria.   Musculoskeletal: Negative for arthralgias and neck stiffness.   Skin: Negative for pallor.   Allergic/Immunologic: Negative for immunocompromised state.   Neurological: Negative for weakness.        LUE and LLE weakness and L fascial heaviness   Psychiatric/Behavioral: Negative for behavioral problems and confusion.     Objective:     Vital Signs (Most Recent):  Temp: 98.4 °F (36.9 °C) (09/28/18 0803)  Pulse: 62 (09/28/18 0857)  Resp: 20 (09/28/18 0857)  BP: (!) 178/87 (09/28/18 0834)  SpO2: 98 % (09/28/18 0857)    Vital Signs Range (Last 24H):  Temp:  [98.4 °F (36.9 °C)]   Pulse:  [62-77]   Resp:  [18-20]   BP: (172-200)/()   SpO2:  [97 %-99 %]     Physical Exam   Constitutional: She is oriented to person, place, and time. She appears  well-developed and well-nourished. No distress.   HENT:   Head: Normocephalic and atraumatic.   Eyes: EOM are normal. Pupils are equal, round, and reactive to light. Right eye exhibits no discharge. Left eye exhibits no discharge. No scleral icterus.   Neck: Normal range of motion.   Cardiovascular: Normal rate and regular rhythm.   Pulmonary/Chest: Effort normal and breath sounds normal. No respiratory distress.   Abdominal: Soft. Bowel sounds are normal. There is no tenderness.   Musculoskeletal: Normal range of motion. She exhibits no deformity.   Neurological: She is alert and oriented to person, place, and time. No cranial nerve deficit.   LUE and LLE weakness and decreased in sensation when compare it to the R side.   Skin: Skin is warm and dry. Capillary refill takes less than 2 seconds. She is not diaphoretic.   Psychiatric: She has a normal mood and affect.       Neurological Exam:   LOC: alert and oriented  Attention Span: Good   Language: No aphasia  Articulation: No dysarthria  Orientation: Person, Place, Time   Visual Fields: Full  EOM (CN III, IV, VI): Full/intact  Pupils (CN II, III): PERRL  Facial Sensation (CN V): Normal on the R side and diminished on the L.  Facial Movement (CN VII): Normal bilaterally  Motor:   Arm left 4/5  Leg left  4/5  Arm right  Normal 5/5  Leg right Normal 5/5  Sensation: Intact to light touch, temperature and vibration  Tone: Normal tone throughout          Laboratory:  Reviwed    Diagnostic Results:      Brain imaging:  CTH on 9/28 showed No evidence of acute hemorrhage or major vascular distribution infarct. Small remote right cerebellar infarct.    Vessel Imaging:  None    Cardiac Evaluation:   None        Bry Taylor MD  Comprehensive Stroke Center  Department of Vascular Neurology   [facility]

## 2018-09-28 NOTE — SUBJECTIVE & OBJECTIVE
Past Medical History:   Diagnosis Date    Anxiety     Asthma     Atrial fibrillation     Brain anoxic injury     Defibrillator activation 2013    Depression     Heart block     History of sudden cardiac arrest 2/2013    PEA arrest with subsequent long-QT    Hx of psychiatric care     Hypertension     Left atrial enlargement 4/11/2018    Pacemaker 2013    Psychiatric problem     Seizures     Sleep difficulties     Stroke     weakness lt side    Therapy      Past Surgical History:   Procedure Laterality Date    breast reduction      BREAST SURGERY      CARDIAC DEFIBRILLATOR PLACEMENT      CARDIAC DEFIBRILLATOR PLACEMENT      CARPAL TUNNEL RELEASE Right     colon  N/A 6/24/2014    [facility]formed [facility] ENDO (2ND FLR)    COSMETIC SURGERY      egd N/A 6/24/2014    Performed by [facility] at [facility] ENDO (2ND FLR)    HERNIA REPAIR      HIATAL HERNIA REPAIR      INSERT / REPLACE / REMOVE PACEMAKER  10/2017    CRT-D upgrade    INSERTION, CARDIAC PACEMAKER, DUAL CHAMBER N/A 2/15/2013    Performed by Humberto Martins MD at [facility] CATH LAB    INSERTION-ICD-BIVENTRICULAR N/A 10/13/2017    Performed by Avelino Mejia MD at [facility] CATH LAB    RELEASE-CARPAL TUNNEL Left 2/3/2017    Per[facility] by Matt Pugh Jr[facility] [place] [place]    RELEASE-CARPAL TUNNEL Right 12/9/2016    Performed by Matt Pugh Jr[facility] [place] [place]    TUBAL LIGATION      VENOGRAM N/A 10/13/2017    Performed by Avelino Mejia MD at [facility] CATH LAB     Family History   Problem Relation Age of Onset    Hypertension Mother     COPD Unknown     Heart failure Unknown     Glaucoma Maternal Grandmother     Glaucoma Paternal Grandmother      Social History     Tobacco Use    Smoking status: Never Smoker    Smokeless tobacco: Never Used   Substance Use Topics    Alcohol use: No    Drug use: No     Review of patient's allergies indicates:   Allergen Reactions    Aspirin Hives    Imitrex [sumatriptan]  Palpitations    Penicillins Hives and Swelling    Shellfish containing products Anaphylaxis     seafood    Percocet [oxycodone-acetaminophen] Itching     States can take    Reglan [metoclopramide hcl] Other (See Comments)     Parkinsonism        Medications: I have reviewed the current medication administration record.      (Not in a hospital admission)    Review of Systems   Constitutional: Negative for appetite change, chills and unexpected weight change.   HENT: Negative for congestion, hearing loss, nosebleeds and rhinorrhea.    Eyes: Negative for photophobia, discharge and visual disturbance.   Respiratory: Negative for cough and shortness of breath.    Cardiovascular: Negative for chest pain, palpitations and leg swelling.   Gastrointestinal: Negative for abdominal pain, nausea and vomiting.   Endocrine: Negative for polydipsia and polyphagia.   Genitourinary: Negative for dysuria.   Musculoskeletal: Negative for arthralgias and neck stiffness.   Skin: Negative for pallor.   Allergic/Immunologic: Negative for immunocompromised state.   Neurological: Negative for weakness.        LUE and LLE weakness and L fascial heaviness   Psychiatric/Behavioral: Negative for behavioral problems and confusion.     Objective:     Vital Signs (Most Recent):  Temp: 98.4 °F (36.9 °C) (09/28/18 0803)  Pulse: 62 (09/28/18 0857)  Resp: 20 (09/28/18 0857)  BP: (!) 178/87 (09/28/18 0834)  SpO2: 98 % (09/28/18 0857)    Vital Signs Range (Last 24H):  Temp:  [98.4 °F (36.9 °C)]   Pulse:  [62-77]   Resp:  [18-20]   BP: (172-200)/()   SpO2:  [97 %-99 %]     Physical Exam   Constitutional: She is oriented to person, place, and time. She appears well-developed and well-nourished. No distress.   HENT:   Head: Normocephalic and atraumatic.   Eyes: EOM are normal. Pupils are equal, round, and reactive to light. Right eye exhibits no discharge. Left eye exhibits no discharge. No scleral icterus.   Neck: Normal range of motion.    Cardiovascular: Normal rate and regular rhythm.   Pulmonary/Chest: Effort normal and breath sounds normal. No respiratory distress.   Abdominal: Soft. Bowel sounds are normal. There is no tenderness.   Musculoskeletal: Normal range of motion. She exhibits no deformity.   Neurological: She is alert and oriented to person, place, and time. No cranial nerve deficit.   LUE and LLE weakness and decreased in sensation when compare it to the R side.   Skin: Skin is warm and dry. Capillary refill takes less than 2 seconds. She is not diaphoretic.   Psychiatric: She has a normal mood and affect.       Neurological Exam:   LOC: alert and oriented  Attention Span: Good   Language: No aphasia  Articulation: No dysarthria  Orientation: Person, Place, Time   Visual Fields: Full  EOM (CN III, IV, VI): Full/intact  Pupils (CN II, III): PERRL  Facial Sensation (CN V): Normal on the R side and diminished on the L.  Facial Movement (CN VII): Normal bilaterally  Motor:   Arm left 4/5  Leg left  4/5  Arm right  Normal 5/5  Leg right Normal 5/5  Sensation: Intact to light touch, temperature and vibration  Tone: Normal tone throughout          Laboratory:  Reviwed    Diagnostic Results:      Brain imaging:  CTH on 9/28 showed No evidence of acute hemorrhage or major vascular distribution infarct. Small remote right cerebellar infarct.    Vessel Imaging:  None    Cardiac Evaluation:   None

## 2018-09-28 NOTE — PT/OT/SLP EVAL
Speech Language Pathology Evaluation  Bedside Swallow    Patient Name:  Amna Chawla   MRN:  [id number]  Admitting Diagnosis: <principal problem not specified>    Recommendations:                 General Recommendations:  Cognitive-linguistic evaluation and follow up diet tolerance  Diet recommendations:  Regular, Thin   Aspiration Precautions: Standard aspiration precautions   General Precautions: Standard,    Communication strategies:  none    History:     Past Medical History:   Diagnosis Date    Anxiety     Asthma     Atrial fibrillation     Brain anoxic injury     Defibrillator activation 2013    Depression     Heart block     History of sudden cardiac arrest 2/2013    PEA arrest with subsequent long-QT    Hx of psychiatric care     Hypertension     Left atrial enlargement 4/11/2018    Pacemaker 2013    Psychiatric problem     Seizures     Sleep difficulties     Stroke     weakness lt side    Therapy        Past Surgical History:   Procedure Laterality Date    breast reduction      BREAST SURGERY      CARDIAC DEFIBRILLATOR PLACEMENT      CARDIAC DEFIBRILLATOR PLACEMENT      CARPAL TUNNEL RELEASE Right     colon  N/A 6/24/2014    Performed by Chemo Bustamante MD at [facility] ENDO (2ND FLR)    COSMETIC SURGERY      egd N/A 6/24/2014    Performed by Chemo Bustamante MD at [facility] ENDO (2ND FLR)    HERNIA REPAIR      HIATAL HERNIA REPAIR      INSERT / REPLACE / REMOVE PACEMAKER  10/2017    CRT-D upgrade    INSERTION, CARDIAC PACEMAKER, DUAL CHAMBER N/A 2/15/2013    Performed by Humberto Martins MD at [facility] CATH LAB    INSERTION-ICD-BIVENTRICULAR N/A 10/13/2017    Performed by Avelino Mejia MD at [facility] CATH LAB    RELEASE-CARPAL TUNNEL Left 2/3/2017    Performed by Matt Pugh Jr., MD at [facility] OR    RELEASE-CARPAL TUNNEL Right 12/9/2016    Performed by Matt Pugh Jr., MD at [facility] OR    TUBAL LIGATION      VENOGRAM N/A 10/13/2017    Performed by Aevlino Mejia MD at [facility] CATH LAB  "      Prior diet: Regular / thin liquids.       Subjective     Awake; alert.   "I can tell my speech is a little different, my tongue and this side of my face (left side) feels heavy".     Pain/Comfort:  · Pain Rating 1: 0/10    Objective:     Oral Musculature Evaluation  · Oral Musculature: WFL  · Dentition: present and adequate  · Secretion Management: adequate  · Mucosal Quality: good  · Mandibular Strength and Mobility: WFL  · Oral Labial Strength and Mobility: WFL  · Lingual Strength and Mobility: WFL(though patient reported a sensation of "heaviness")  · Velar Elevation: WFL  · Buccal Strength and Mobility: WFL  · Volitional Cough: Present  · Volitional Swallow: Present  · Voice Prior to PO Intake: Clear    Bedside Swallow Eval:   Consistencies Assessed:  · Thin liquids via straw sipx 5  · Solids via 1 whole cracker     Oral Phase:   · WFL    Pharyngeal Phase:   · no overt clinical signs/symptoms of aspiration  · no overt clinical signs/symptoms of pharyngeal dysphagia    Compensatory Strategies  · None    Treatment: Skilled education provided to patient regarding diet recommendations, aspiration precautions, and full speech, language, and cognitive assessment to be completed next visit. Patient verbalized understanding.   Whiteboard updated.     Assessment:     Amna Chawla is a 53 y.o. female with an SLP diagnosis of No orpharyngeal dysphagia. .     Goals:   Multidisciplinary Problems     SLP Goals        Problem: SLP Goal    Goal Priority Disciplines Outcome   SLP Goal     SLP    Description:  Speech Language Pathology Goals  Goals expected to be met by 10/5  1. Patient will tolerate regular consistency diet and thin liquids with no overt signs of airway compromise.   2. Patient will participate in full speech, language, and cognitive evaluation to determine possible further therapeutic interventions.                         Plan:     · Patient to be seen:  4 x/week   · Plan of Care expires:  " 10/28/18  · Plan of Care reviewed with:      · SLP Follow-Up:  Yes       Discharge recommendations:  other (see comments)   Barriers to Discharge:  None    Time Tracking:     SLP Treatment Date:   09/28/18  Speech Start Time:  1310  Speech Stop Time:  1320     Speech Total Time (min):  10 min    Billable Minutes: Eval Swallow and Oral Function 10    Bethany Ray M.S., CCC-SLP  Speech Language Pathologist  [phone]  09/28/2018

## 2018-09-29 LAB
ALBUMIN SERPL BCP-MCNC: 3.1 G/DL
ALP SERPL-CCNC: 64 U/L
ALT SERPL W/O P-5'-P-CCNC: 21 U/L
ANION GAP SERPL CALC-SCNC: 8 MMOL/L
APTT BLDCRRT: 26.7 SEC
AST SERPL-CCNC: 15 U/L
BASOPHILS # BLD AUTO: 0.01 K/UL
BASOPHILS NFR BLD: 0.2 %
BILIRUB SERPL-MCNC: 0.3 MG/DL
BUN SERPL-MCNC: 15 MG/DL
CALCIUM SERPL-MCNC: 9.4 MG/DL
CHLORIDE SERPL-SCNC: 106 MMOL/L
CK MB SERPL-MCNC: 1.5 NG/ML
CK MB SERPL-RTO: 0.8 %
CK SERPL-CCNC: 193 U/L
CO2 SERPL-SCNC: 21 MMOL/L
CREAT SERPL-MCNC: 0.9 MG/DL
DIFFERENTIAL METHOD: ABNORMAL
EOSINOPHIL # BLD AUTO: 0 K/UL
EOSINOPHIL NFR BLD: 0 %
ERYTHROCYTE [DISTWIDTH] IN BLOOD BY AUTOMATED COUNT: 16.1 %
EST. GFR  (AFRICAN AMERICAN): >60 ML/MIN/1.73 M^2
EST. GFR  (NON AFRICAN AMERICAN): >60 ML/MIN/1.73 M^2
GLUCOSE SERPL-MCNC: 126 MG/DL
HCT VFR BLD AUTO: 37.1 %
HGB BLD-MCNC: 11.9 G/DL
IMM GRANULOCYTES # BLD AUTO: 0.01 K/UL
IMM GRANULOCYTES NFR BLD AUTO: 0.2 %
INR PPP: 1.1
LYMPHOCYTES # BLD AUTO: 0.7 K/UL
LYMPHOCYTES NFR BLD: 12.9 %
MAGNESIUM SERPL-MCNC: 1.9 MG/DL
MCH RBC QN AUTO: 29.2 PG
MCHC RBC AUTO-ENTMCNC: 32.1 G/DL
MCV RBC AUTO: 91 FL
MONOCYTES # BLD AUTO: 0.2 K/UL
MONOCYTES NFR BLD: 3.1 %
NEUTROPHILS # BLD AUTO: 4.5 K/UL
NEUTROPHILS NFR BLD: 83.6 %
NRBC BLD-RTO: 0 /100 WBC
PHOSPHATE SERPL-MCNC: 3 MG/DL
PLATELET # BLD AUTO: 205 K/UL
PMV BLD AUTO: 12.5 FL
POTASSIUM SERPL-SCNC: 3.9 MMOL/L
PROT SERPL-MCNC: 9.3 G/DL
PROTHROMBIN TIME: 11.7 SEC
RBC # BLD AUTO: 4.07 M/UL
SODIUM SERPL-SCNC: 135 MMOL/L
TROPONIN I SERPL DL<=0.01 NG/ML-MCNC: <0.006 NG/ML
WBC # BLD AUTO: 5.43 K/UL

## 2018-09-29 PROCEDURE — 82553 CREATINE MB FRACTION: CPT

## 2018-09-29 PROCEDURE — 63600175 PHARM REV CODE 636 W HCPCS: Performed by: STUDENT IN AN ORGANIZED HEALTH CARE EDUCATION/TRAINING PROGRAM

## 2018-09-29 PROCEDURE — 97165 OT EVAL LOW COMPLEX 30 MIN: CPT

## 2018-09-29 PROCEDURE — G8978 MOBILITY CURRENT STATUS: HCPCS | Mod: CJ

## 2018-09-29 PROCEDURE — 85730 THROMBOPLASTIN TIME PARTIAL: CPT

## 2018-09-29 PROCEDURE — 25000003 PHARM REV CODE 250: Performed by: NURSE PRACTITIONER

## 2018-09-29 PROCEDURE — 36415 COLL VENOUS BLD VENIPUNCTURE: CPT

## 2018-09-29 PROCEDURE — G8979 MOBILITY GOAL STATUS: HCPCS | Mod: CI

## 2018-09-29 PROCEDURE — 99233 SBSQ HOSP IP/OBS HIGH 50: CPT | Mod: ,,, | Performed by: PSYCHIATRY & NEUROLOGY

## 2018-09-29 PROCEDURE — 80053 COMPREHEN METABOLIC PANEL: CPT

## 2018-09-29 PROCEDURE — 84484 ASSAY OF TROPONIN QUANT: CPT

## 2018-09-29 PROCEDURE — G8980 MOBILITY D/C STATUS: HCPCS | Mod: CJ

## 2018-09-29 PROCEDURE — 25000003 PHARM REV CODE 250: Performed by: STUDENT IN AN ORGANIZED HEALTH CARE EDUCATION/TRAINING PROGRAM

## 2018-09-29 PROCEDURE — 94761 N-INVAS EAR/PLS OXIMETRY MLT: CPT

## 2018-09-29 PROCEDURE — 97161 PT EVAL LOW COMPLEX 20 MIN: CPT

## 2018-09-29 PROCEDURE — 82550 ASSAY OF CK (CPK): CPT

## 2018-09-29 PROCEDURE — 25000003 PHARM REV CODE 250: Performed by: PHYSICIAN ASSISTANT

## 2018-09-29 PROCEDURE — 97802 MEDICAL NUTRITION INDIV IN: CPT

## 2018-09-29 PROCEDURE — 85025 COMPLETE CBC W/AUTO DIFF WBC: CPT

## 2018-09-29 PROCEDURE — 85610 PROTHROMBIN TIME: CPT

## 2018-09-29 PROCEDURE — 84100 ASSAY OF PHOSPHORUS: CPT

## 2018-09-29 PROCEDURE — 83735 ASSAY OF MAGNESIUM: CPT

## 2018-09-29 PROCEDURE — 20600001 HC STEP DOWN PRIVATE ROOM

## 2018-09-29 PROCEDURE — A4216 STERILE WATER/SALINE, 10 ML: HCPCS | Performed by: STUDENT IN AN ORGANIZED HEALTH CARE EDUCATION/TRAINING PROGRAM

## 2018-09-29 PROCEDURE — 97116 GAIT TRAINING THERAPY: CPT

## 2018-09-29 RX ORDER — ARIPIPRAZOLE 5 MG/1
5 TABLET ORAL NIGHTLY
Status: DISCONTINUED | OUTPATIENT
Start: 2018-09-29 | End: 2018-09-30 | Stop reason: HOSPADM

## 2018-09-29 RX ORDER — KETOROLAC TROMETHAMINE 30 MG/ML
60 INJECTION, SOLUTION INTRAMUSCULAR; INTRAVENOUS ONCE
Status: COMPLETED | OUTPATIENT
Start: 2018-09-29 | End: 2018-09-29

## 2018-09-29 RX ORDER — MORPHINE SULFATE ORAL SOLUTION 10 MG/5ML
7.5 SOLUTION ORAL ONCE
Status: COMPLETED | OUTPATIENT
Start: 2018-09-29 | End: 2018-09-29

## 2018-09-29 RX ORDER — MORPHINE SULFATE 20 MG/ML
7.5 SOLUTION ORAL ONCE
Status: DISCONTINUED | OUTPATIENT
Start: 2018-09-29 | End: 2018-09-29

## 2018-09-29 RX ORDER — ACETAMINOPHEN 10 MG/ML
1000 INJECTION, SOLUTION INTRAVENOUS ONCE
Status: DISCONTINUED | OUTPATIENT
Start: 2018-09-29 | End: 2018-09-29

## 2018-09-29 RX ORDER — ONDANSETRON 4 MG/1
4 TABLET, ORALLY DISINTEGRATING ORAL EVERY 6 HOURS PRN
Status: DISCONTINUED | OUTPATIENT
Start: 2018-09-29 | End: 2018-09-30 | Stop reason: HOSPADM

## 2018-09-29 RX ORDER — HYDRALAZINE HYDROCHLORIDE 20 MG/ML
10 INJECTION INTRAMUSCULAR; INTRAVENOUS EVERY 8 HOURS PRN
Status: DISCONTINUED | OUTPATIENT
Start: 2018-09-29 | End: 2018-09-30 | Stop reason: HOSPADM

## 2018-09-29 RX ORDER — MAGNESIUM SULFATE HEPTAHYDRATE 40 MG/ML
2 INJECTION, SOLUTION INTRAVENOUS ONCE
Status: DISCONTINUED | OUTPATIENT
Start: 2018-09-29 | End: 2018-09-29

## 2018-09-29 RX ORDER — CARVEDILOL 12.5 MG/1
12.5 TABLET ORAL 2 TIMES DAILY
Status: DISCONTINUED | OUTPATIENT
Start: 2018-09-29 | End: 2018-09-30 | Stop reason: HOSPADM

## 2018-09-29 RX ADMIN — PANTOPRAZOLE SODIUM 40 MG: 40 TABLET, DELAYED RELEASE ORAL at 09:09

## 2018-09-29 RX ADMIN — KETOROLAC TROMETHAMINE 60 MG: 30 INJECTION, SOLUTION INTRAMUSCULAR at 01:09

## 2018-09-29 RX ADMIN — ACETAMINOPHEN 650 MG: 325 TABLET ORAL at 05:09

## 2018-09-29 RX ADMIN — CLOPIDOGREL 75 MG: 75 TABLET, FILM COATED ORAL at 09:09

## 2018-09-29 RX ADMIN — MAGNESIUM SULFATE HEPTAHYDRATE: 500 INJECTION, SOLUTION INTRAMUSCULAR; INTRAVENOUS at 01:09

## 2018-09-29 RX ADMIN — ARIPIPRAZOLE 5 MG: 5 TABLET ORAL at 08:09

## 2018-09-29 RX ADMIN — RIVAROXABAN 20 MG: 20 TABLET, FILM COATED ORAL at 04:09

## 2018-09-29 RX ADMIN — GABAPENTIN 900 MG: 300 CAPSULE ORAL at 09:09

## 2018-09-29 RX ADMIN — LOSARTAN POTASSIUM 50 MG: 50 TABLET ORAL at 09:09

## 2018-09-29 RX ADMIN — Medication 3 ML: at 02:09

## 2018-09-29 RX ADMIN — GABAPENTIN 900 MG: 300 CAPSULE ORAL at 12:09

## 2018-09-29 RX ADMIN — MORPHINE SULFATE 7.5 MG: 10 SOLUTION ORAL at 08:09

## 2018-09-29 RX ADMIN — ROSUVASTATIN CALCIUM 40 MG: 20 TABLET, FILM COATED ORAL at 08:09

## 2018-09-29 RX ADMIN — GABAPENTIN 900 MG: 300 CAPSULE ORAL at 08:09

## 2018-09-29 RX ADMIN — ARIPIPRAZOLE 5 MG: 5 TABLET ORAL at 02:09

## 2018-09-29 RX ADMIN — CARVEDILOL 12.5 MG: 12.5 TABLET, FILM COATED ORAL at 08:09

## 2018-09-29 RX ADMIN — GABAPENTIN 900 MG: 300 CAPSULE ORAL at 04:09

## 2018-09-29 NOTE — PROGRESS NOTES
"Patient paged on-call neurology this morning.  Reports severe migraine and says primary team not giving her her typical "infusion" as prescribed by Dr. Cortez.    I reviewed chart, have not seen patient, but this is what was given earlier this year:    While admitted, pt given cocktail for HA which she has received previously at the infusion clinic - IV Magnesium, IM Toradol, 2mg IV Morphine, 500cc NS bolus (IV Benadryl not included this time as pt had received a dose earlier that day prior to contrast imaging.)         "

## 2018-09-29 NOTE — PLAN OF CARE
Problem: Patient Care Overview  Goal: Plan of Care Review  Recommendations     Recommendation/Intervention:   1.Adv. diet as medically able to regular w/ thin liq (per SLP).   2.Encourage po intake > 75%.   3.If po intake< 50% Provide Boost Plus TID.   4.RD to follow  Goals: nutrient intake >/= 85% EEN/EPN   Nutrition Goal Status: new  Communication of RD Recs: (POC)     Reason for Assessment     Reason for Assessment: consult  Diagnosis: (Acute ischemic stroke )  Relevant Medical History: HTN, s/p pacemaker 2013, stroke, Thyroid disease  General Information Comments: pt presented w/ left U&LE weakness w/ facial drooping. weakness improved today pt able to work w/ PT. Speech cleared pt for Reg thin diet, currently remains NPO.  Pt denies NVD.  Pt wt is stable x 1 yr and pt show no signs of fat/muscle wasting.  Nutrition Discharge Planning: Cardiac diet w/ adequate po intake

## 2018-09-29 NOTE — ASSESSMENT & PLAN NOTE
Ms Chawla is a 54 yo woman with Afib (recently off Xarelto per Dr. 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     DDx: migraine, cardioembolic stroke  Other ddx considered: HTN emergency, carotid stenosis, TIA    CTH at ED showed no evidence of acute hemorrhage or major vascular distribution infarct.   MRI unable to perform due to CRT-D device.  ECHO: shows normal EF, biatrial enlargement   CTA (4/2018): bl carotid stenosis <50%, hypoplastic posterior circulation.     -Sx onset was >4.5 hrs, did not receive TPA or mech thrombectomy  -Medical management with Plavix 75mg and Xarelto   -BP is currently labile, on losartan only. Will add coreg. Takes 25 BID at home, will start lower dose and titrate as needed. Also having 10/10 headaches/migraines, may be contributing to lability.   -On review of prior headache management, will give cocktail as follows: Mag 2g infusion, 500 cc NS bolus over 4 hrs, Toradol 60 mg IM. Also gets benadryl typically, but will hold due to concern for BP lability.   -Like discharge tomorrow w/ HH PT/OT  -Diet started.

## 2018-09-29 NOTE — NURSING
Patient complains of consistent chest. Awaiting special cocktail for relief. Pharmacy contacted. Currently contacting doctor for medication reconciliation. Will continue to monitor.

## 2018-09-29 NOTE — PROGRESS NOTES
[facility]  Vascular Neurology  Comprehensive Stroke Center  Progress Note    Assessment/Plan:     * Acute ischemic stroke    Ms Chawla is a 52 yo woman with Afib (recently off Xarelto per Dr. 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     DDx: migraine, cardioembolic stroke  Other ddx considered: HTN emergency, carotid stenosis, TIA    CTH at ED showed no evidence of acute hemorrhage or major vascular distribution infarct.   MRI unable to perform due to CRT-D device.  ECHO: shows normal EF, biatrial enlargement   CTA (4/2018): bl carotid stenosis <50%, hypoplastic posterior circulation.     -Sx onset was >4.5 hrs, [facility] not [facility]  -Medical management with Plavix 75mg and Xarelto   -BP is currently labile, on losartan only. Will add coreg. Takes 25 BID at home, will start lower dose and titrate as needed. Also having 10/10 headaches/migraines, may be contributing to lability.   -On review of prior headache management, will give cocktail as follows: Mag 2g infusion, 500 cc NS bolus over 4 hrs, Toradol 60 mg IM. Also gets benadryl typically, but will hold due to concern for BP lability.   -Like discharge tomorrow w/ HH PT/OT  -Diet started.           Long term (current) use of anticoagulants    -she is on Xarelto and Plavix at home  -she is complaint with med. Was briefly off of Xarelto for nose bleed. If recurrence of nose bleed prefer embolization rather than removing Anticoagulation.           Paroxysmal atrial fibrillation    AC with Xarelto  Also on plavix for stroke prevention        Headaches due to old head injury    -Patient c/o HA that began at ~0230 and progressed to extremity weakness prompting her ED visit.  Took her migraine medication at the time w/o  relief.  Remains w/a HA unrelieved by prn meds.  -Review of the patient's medical record reveals she saw Dr. Cortez in the [facility] on 8/02/18.  At that time HA control recommendations were made, see note.  -Solumedrol, Magnesium, Fioricet, and Zanaflex given x1 in ED  -Will give Mg infusion w/ 500 cc bolus, and toradol. No benedryl due to c/w BP.         Essential hypertension    -resume losartan. Restart coreg.   -see above regarding BP control          History of CVA (cerebrovascular accident)    -she had a hx of CVA with L sided weakness on 2013.  -no residual deficits since.  -she reports hx of TIA and R sided weakness on May 2018.  -she is compliant with anticoagulation meds at home.        HLD (hyperlipidemia)    -resume rosuvastatin tablet 40 mg.             Ms Chawla is a 54 yo woman with Afib (briefly off Xarelto for nose bleed), on plavix for stroke prevention, HFrEF w/ CRT-D (non-ischemic?), HTN, here for left hemiparesis and headache. On arrival to ED found to have HTN to 200/122. CTH showed small remote cerebellar infarct, but no acute ischemia or bleed. MRI was unable to be performed due to CRT-D device. She was treated medically with Plavix 75mg, Xarelto, Rosuvastatin 40mg. She has had labile BP since admission, but no other evidence of end-organ damage. BP currently treated with losartan but remains labile. She continues to have a intractable headache (which preceeded her stroke like symptoms by about 4 hours).     STROKE DOCUMENTATION   Acute Stroke Times   Last Known Normal Time: 0230  Symptom Onset Date: 09/28/18  Symptom Onset Time: (unknown)  Stroke Team Called Date: 09/28/18  Stroke Team Called Time: 0813  Stroke Team Arrival Date: 09/28/18  Stroke Team Arrival Time: 0820  CT Interpretation Time: 0821    NIH Scale:  Interval: 24 hrs post onset of symptoms +/- 20 mins  1a. Level Of Consciousness: 0-->Alert: keenly responsive  1b. LOC Questions: 0-->Answers both questions correctly  1c. LOC  Commands: 0-->Performs both tasks correctly  2. Best Gaze: 0-->Normal  3. Visual: 0-->No visual loss  4. Facial Palsy: 1-->Minor paralysis (flattened nasolabial fold, asymmetry on smiling)  5a. Motor Arm, Left: 0-->No drift: limb holds 90 (or 45) degrees for full 10 secs  5b. Motor Arm, Right: 0-->No drift: limb holds 90 (or 45) degrees for full 10 secs  6a. Motor Leg, Left: 0-->No drift: leg holds 30 degree position for full 5 secs  6b. Motor Leg, Right: 0-->No drift: leg holds 30 degree position for full 5 secs  7. Limb Ataxia: 0-->Absent  8. Sensory: 0-->Normal: no sensory loss  9. Best Language: 0-->No aphasia: normal  10. Dysarthria: 0-->Normal  11. Extinction and Inattention (formerly Neglect): 0-->No abnormality  Total (NIH Stroke Scale): 1       Modified [place]    Brian Coma Scale:15   ABCD2 Score:    [id number] Score:5  HAS -BLED Score:4  ICH Score:   [employer] Classification:      Hemorrhagic change of an Ischemic Stroke: Does this patient have an ischemic stroke with hemorrhagic changes? No         Past Medical History:   Diagnosis Date    Anticoagulant long-term use     Anxiety     Asthma     Atrial fibrillation     Brain anoxic injury     Defibrillator activation 2013    Depression     Heart block     History of sudden cardiac arrest 2/2013    PEA arrest with subsequent long-QT    Hx of psychiatric care     Hypertension     Left atrial enlargement 4/11/2018    Pacemaker 2013    Psychiatric problem     Seizures     Sleep difficulties     Stroke     weakness lt side    Therapy     Thyroid disease      Past Surgical History:   Procedure Laterality Date    APPENDECTOMY      breast reduction      BREAST SURGERY      CARDIAC DEFIBRILLATOR PLACEMENT      CARDIAC DEFIBRILLATOR PLACEMENT      CARPAL TUNNEL RELEASE Right     colon  N/A 6/24/2014    Performed [facility] at [facility] ENDO (2ND FLR)    COSMETIC SURGERY      egd N/A 6/24/2014    Performed by [facility] at [facility]  ENDO (2ND FLR)    HERNIA REPAIR      HIATAL HERNIA REPAIR      INSERT / REPLACE / REMOVE PACEMAKER  10/2017    CRT-D upgrade    INSERTION, CARDIAC PACEMAKER, DUAL CHAMBER N/A 2/15/2013    Performed by [facility]ins [facility] at [facility] CATH LAB    INSERTION-ICD-BIVENTRICULAR N/A 10/13/2017    Performed by Avelino Mejia MD at [facility] CATH LAB    RELEASE-CARPAL TUNNEL Left 2/3/2017    Performed by Matt Pugh Jr[facility] at [facility] OR    RELEASE-CARPAL TUNNEL Right 12/9/2016    Per[facility] by [facility] at [facility] OR    TUBAL LIGATION      VENOGRAM N/A 10/13/2017    Performed by [facility]jai [facility] at [facility] CATH LAB     Family History   Problem Relation Age of Onset    Hypertension Mother     COPD Unknown     Heart failure Unknown     Glaucoma Maternal Grandmother     Glaucoma Paternal Grandmother      Social History     Tobacco Use    Smoking status: Never Smoker    Smokeless tobacco: Never Used   Substance Use Topics    Alcohol use: No    Drug use: No     Review of patient's allergies indicates:   Allergen Reactions    Aspirin Hives    Imitrex [sumatriptan] Palpitations    Penicillins Hives and Swelling    Shellfish containing products Anaphylaxis     seafood    Percocet [oxycodone-acetaminophen] Itching     States can take    Reglan [metoclopramide hcl] Other (See Comments)     Parkinsonism        Medications: I have reviewed the current medication administration record.    Facility-Administered Medications Prior to Admission   Medication Dose Route Frequency Provider Last Rate Last Dose    ketorolac injection 60 mg  60 mg Intramuscular 1 time in Clinic/HOD David Cortez MD        onabotulinumtoxina injection 200 Units  200 Units Intramuscular Q90 Days David Cortez MD   200 Units at 05/16/18 1107    onabotulinumtoxina injection 200 Units  200 Units Intramuscular Q90 Days David Cortez MD   200 Units at 08/02/18 1112     Medications Prior to Admission   Medication Sig Dispense Refill  Last Dose    ARIPiprazole (ABILIFY) 5 MG Tab Take 1 tablet (5 mg total) by mouth every evening. 30 tablet 5 9/27/2018 at 0800    baclofen (LIORESAL) 20 MG tablet Take 1 tablet (20 mg total) by mouth 3 (three) times daily. for 10 days 30 tablet 0 Past Week at Unknown time    carvedilol (COREG) 25 MG tablet TAKE 1 TABLET(25 MG) BY MOUTH TWICE DAILY WITH MEALS 180 tablet 3 9/27/2018 at 0800    DULoxetine (CYMBALTA) 60 MG capsule Take 1 capsule (60 mg total) by mouth 2 (two) times daily. 60 capsule 5 9/28/2018 at 0800    erenumab-aooe (AIMOVIG AUTOINJECTOR) 70 mg/mL AtIn Inject 70 mg into the skin every 30 days. 1 mL 6 Past Month at Unknown time    gabapentin (NEURONTIN) 300 MG capsule Take 3 capsules (900 mg total) by mouth 3 (three) times daily. 810 capsule 12 9/27/2018 at 2300    hydrOXYzine (ATARAX) 50 MG tablet Take 1 tablet (50 mg total) by mouth nightly as needed. 30 tablet 5 Past Week at Unknown time    losartan (COZAAR) 50 MG tablet Take 1 tablet (50 mg total) by mouth once daily. 90 tablet 3 9/28/2018 at 1700    magnesium 200 mg Tab Take 200 mg by mouth once daily. (Patient taking differently: Take 200 mg by mouth every other day. ) 30 each 12 9/28/2018 at 2100    meloxicam (MOBIC) 15 MG tablet Take 1 tablet (15 mg total) by mouth once daily. 30 tablet 3 Past Week at Unknown time    ondansetron (ZOFRAN-ODT) 4 MG TbDL Take 1 tablet (4 mg total) by mouth every 12 (twelve) hours as needed (nausea). 10 tablet 1 9/27/2018 at 2000    pantoprazole (PROTONIX) 40 MG tablet Take 1 tablet (40 mg total) by mouth once daily. 30 tablet 11 9/28/2018 at 1700    rivaroxaban (XARELTO) 20 mg Tab Take 1 tablet (20 mg total) by mouth daily with dinner or evening meal. 30 tablet 11 9/28/2018 at 1700    rosuvastatin (CRESTOR) 40 MG Tab Take 1 tablet (40 mg total) by mouth every evening. 90 tablet 3 9/28/2018 at 1700    tiaGABine (GABITRIL) 4 MG tablet Take 1 tablet (4 mg total) by mouth every evening. 30 tablet 12  9/27/2018 at 1900    zolpidem (AMBIEN CR) 12.5 MG CR tablet TAKE 1 TABLET BY MOUTH NIGHTLY AS NEEDED FOR INSOMNIA 30 tablet 0 Past Week at Unknown time    cetirizine (ZYRTEC) 10 MG tablet Take 1 tablet (10 mg total) by mouth once daily. 90 tablet 11 Unknown at Unknown time    clonazePAM (KLONOPIN) 1 MG tablet Take 1 tablet (1 mg total) by mouth daily as needed for Anxiety. 30 tablet 5 Unknown at Unknown time    clopidogrel (PLAVIX) 75 mg tablet Take 1 tablet (75 mg total) by mouth once daily. 90 tablet 2 Unknown at Unknown time    diphth,pertus,acell,,tetanus (BOOSTRIX) 2.5-8-5 Lf-mcg-Lf/0.5mL Susp Inject 0.5 ml into the muscle once. 0.5 mL 0 Taking    donepezil (ARICEPT) 10 MG tablet Take 1 tablet (10 mg total) by mouth 2 (two) times daily. 180 tablet 3 Unknown at Unknown time    LORazepam (ATIVAN) 1 MG tablet Take 1 tablet (1 mg total) by mouth as needed for Anxiety. 1 tablet 0 Taking    topiramate (TOPAMAX) 100 MG tablet Take 2 tablets (200 mg total) by mouth 2 (two) times daily. 120 tablet 12 Taking    triamcinolone acetonide 0.1% (KENALOG) 0.1 % cream AAA bid to rash up to 2 weeks 60 g 1 Unknown at Unknown time       Review of Systems   Constitutional: Negative for appetite change, chills and unexpected weight change.   HENT: Negative for congestion, hearing loss, nosebleeds and rhinorrhea.    Eyes: Negative for photophobia, discharge and visual disturbance.   Respiratory: Negative for cough and shortness of breath.    Cardiovascular: Negative for chest pain, palpitations and leg swelling.   Gastrointestinal: Negative for abdominal pain, nausea and vomiting.   Endocrine: Negative for polydipsia and polyphagia.   Genitourinary: Negative for dysuria.   Musculoskeletal: Negative for arthralgias and neck stiffness.   Skin: Negative for pallor.   Allergic/Immunologic: Negative for immunocompromised state.   Neurological: Negative for weakness.        LUE and LLE weakness and L fascial heaviness    Psychiatric/Behavioral: Negative for behavioral problems and confusion.     Objective:     Vital Signs (Most Recent):  Temp: 98.4 °F (36.9 °C) (09/29/18 0802)  Pulse: 80 (09/29/18 0802)  Resp: 17 (09/29/18 0802)  BP: (!) 196/106 (09/29/18 0802)  SpO2: 95 % (09/29/18 0619)    Vital Signs Range (Last 24H):  Temp:  [97.8 °F (36.6 °C)-98.4 °F (36.9 °C)]   Pulse:  [56-80]   Resp:  [13-20]   BP: (121-201)/()   SpO2:  [94 %-99 %]     Physical Exam   Constitutional: She is oriented to person, place, and time. She appears well-developed and well-nourished. No distress.   HENT:   Head: Normocephalic and atraumatic.   Eyes: EOM are normal. Pupils are equal, round, and reactive to light. Right eye exhibits no discharge. Left eye exhibits no discharge. No scleral icterus.   Neck: Normal range of motion.   Cardiovascular: Normal rate and regular rhythm.   Pulmonary/Chest: Effort normal and breath sounds normal. No respiratory distress.   Abdominal: Soft. Bowel sounds are normal. There is no tenderness.   Musculoskeletal: Normal range of motion. She exhibits no deformity.   Neurological: She is alert and oriented to person, place, and time. No cranial nerve deficit.   LUE and LLE weakness and decreased in sensation when compare it to the R side.   Skin: Skin is warm and dry. Capillary refill takes less than 2 seconds. She is not diaphoretic.   Psychiatric: She has a normal mood and affect.       Neurological Exam:   LOC: alert and oriented  Attention Span: Good   Language: No aphasia  Articulation: No dysarthria  Orientation: Person, Place, Time   Visual Fields: Full  EOM (CN III, IV, VI): Full/intact  Pupils (CN II, III): PERRL  Facial Sensation (CN V): Normal on the R side and diminished on the L.   Facial Movement (CN VII): Normal bilaterally  Motor:   Arm left 4/5  Leg left  4/5  Arm right  Normal 5/5  Leg right Normal 5/5  Sensation: Intact to light touch, temperature and vibration  Tone: Normal tone  throughout          Laboratory:  [facility]    Diagnostic Results:      Brain imaging:  CTH on 9/28 showed No evidence of acute hemorrhage or major vascular distribution infarct. Small remote right cerebellar infarct.    Vessel Imaging:  None    Cardiac Evaluation:   None  ECHO 9/28    1 - Normal left ventricular systolic function (EF 55-60%).     2 - Biatrial enlargement.     3 - No wall motion abnormalities.     4 - Indeterminate LV diastolic function.     5 - Normal right ventricular systolic function .     6 - The estimated PA systolic pressure is 25 mmHg.     7 - Mild tricuspid regurgitation.     Jordan Martinez MD  Comprehensive Stroke Center  Department of Vascular Neurology   [facility]

## 2018-09-29 NOTE — SUBJECTIVE & OBJECTIVE
Past Medical History:   Diagnosis Date    Anticoagulant long-term use     Anxiety     Asthma     Atrial fibrillation     Brain anoxic injury     Defibrillator activation 2013    Depression     Heart block     History of sudden cardiac arrest 2/2013    PEA arrest with subsequent long-QT    Hx of psychiatric care     Hypertension     Left atrial enlargement 4/11/2018    Pacemaker 2013    Psychiatric problem     Seizures     Sleep difficulties     Stroke     weakness lt side    Therapy     Thyroid disease      Past Surgical History:   Procedure Laterality Date    APPENDECTOMY      breast reduction      BREAST SURGERY      CARDIAC DEFIBRILLATOR PLACEMENT      CARDIAC DEFIBRILLATOR PLACEMENT      CARPAL TUNNEL RELEASE Right     colon  N/A 6/24/2014    Performed [facility] at [facility] ENDO (2ND FLR)    COSMETIC SURGERY      egd N/A 6/24/2014    Performed by Chemo [facility] at [facility] ENDO (2ND FLR)    HERNIA REPAIR      HIATAL HERNIA REPAIR      INSERT / REPLACE / REMOVE PACEMAKER  10/2017    CRT-D upgrade    INSERTION, CARDIAC PACEMAKER, DUAL CHAMBER N/A 2/15/2013    Performed by [facility]to [facility]ins MD at [facility] CATH LAB    INSERTION-ICD-BIVENTRICULAR N/A 10/13/2017    Performed by [facility]ino [facility]jia MD at [facility] CATH LAB    RELEASE-CARPAL TUNNEL Left 2/3/2017    Per[facility] by Matt [facility] [place]    RELEASE-CARPAL TUNNEL Right 12/9/2016    Performed by Matt Pugh Jr[facility] at [facility]    TUBAL LIGATION      VENOGRAM N/A 10/13/2017    Performed by [facility]jia MD at [facility] CATH LAB     Family History   Problem Relation Age of Onset    Hypertension Mother     COPD Unknown     Heart failure Unknown     Glaucoma Maternal Grandmother     Glaucoma Paternal Grandmother      Social History     Tobacco Use    Smoking status: Never Smoker    Smokeless tobacco: Never Used   Substance Use Topics    Alcohol use: No    Drug use: No     Review of patient's allergies indicates:    Allergen Reactions    Aspirin Hives    Imitrex [sumatriptan] Palpitations    Penicillins Hives and Swelling    Shellfish containing products Anaphylaxis     seafood    Percocet [oxycodone-acetaminophen] Itching     States can take    Reglan [metoclopramide hcl] Other (See Comments)     Parkinsonism        Medications: I have reviewed the current medication administration record.    Facility-Administered Medications Prior to Admission   Medication Dose Route Frequency Provider Last Rate Last Dose    ketorolac injection 60 mg  60 mg Intramuscular 1 time in Clinic/HOD David Cortez MD        onabotulinumtoxina injection 200 Units  200 Units Intramuscular Q90 Days David Cortez MD   200 Units at 05/16/18 1107    onabotulinumtoxina injection 200 Units  200 Units Intramuscular Q90 Days David Cortez MD   200 Units at 08/02/18 1112     Medications Prior to Admission   Medication Sig Dispense Refill Last Dose    ARIPiprazole (ABILIFY) 5 MG Tab Take 1 tablet (5 mg total) by mouth every evening. 30 tablet 5 9/27/2018 at 0800    baclofen (LIORESAL) 20 MG tablet Take 1 tablet (20 mg total) by mouth 3 (three) times daily. for 10 days 30 tablet 0 Past Week at Unknown time    carvedilol (COREG) 25 MG tablet TAKE 1 TABLET(25 MG) BY MOUTH TWICE DAILY WITH MEALS 180 tablet 3 9/27/2018 at 0800    DULoxetine (CYMBALTA) 60 MG capsule Take 1 capsule (60 mg total) by mouth 2 (two) times daily. 60 capsule 5 9/28/2018 at 0800    erenumab-aooe (AIMOVIG AUTOINJECTOR) 70 mg/mL AtIn Inject 70 mg into the skin every 30 days. 1 mL 6 Past Month at Unknown time    gabapentin (NEURONTIN) 300 MG capsule Take 3 capsules (900 mg total) by mouth 3 (three) times daily. 810 capsule 12 9/27/2018 at 2300    hydrOXYzine (ATARAX) 50 MG tablet Take 1 tablet (50 mg total) by mouth nightly as needed. 30 tablet 5 Past Week at Unknown time    losartan (COZAAR) 50 MG tablet Take 1 tablet (50 mg total) by mouth once daily. 90 tablet 3 9/28/2018  at 1700    magnesium 200 mg Tab Take 200 mg by mouth once daily. (Patient taking differently: Take 200 mg by mouth every other day. ) 30 each 12 9/28/2018 at 2100    meloxicam (MOBIC) 15 MG tablet Take 1 tablet (15 mg total) by mouth once daily. 30 tablet 3 Past Week at Unknown time    ondansetron (ZOFRAN-ODT) 4 MG TbDL Take 1 tablet (4 mg total) by mouth every 12 (twelve) hours as needed (nausea). 10 tablet 1 9/27/2018 at 2000    pantoprazole (PROTONIX) 40 MG tablet Take 1 tablet (40 mg total) by mouth once daily. 30 tablet 11 9/28/2018 at 1700    rivaroxaban (XARELTO) 20 mg Tab Take 1 tablet (20 mg total) by mouth daily with dinner or evening meal. 30 tablet 11 9/28/2018 at 1700    rosuvastatin (CRESTOR) 40 MG Tab Take 1 tablet (40 mg total) by mouth every evening. 90 tablet 3 9/28/2018 at 1700    tiaGABine (GABITRIL) 4 MG tablet Take 1 tablet (4 mg total) by mouth every evening. 30 tablet 12 9/27/2018 at 1900    zolpidem (AMBIEN CR) 12.5 MG CR tablet TAKE 1 TABLET BY MOUTH NIGHTLY AS NEEDED FOR INSOMNIA 30 tablet 0 Past Week at Unknown time    cetirizine (ZYRTEC) 10 MG tablet Take 1 tablet (10 mg total) by mouth once daily. 90 tablet 11 Unknown at Unknown time    clonazePAM (KLONOPIN) 1 MG tablet Take 1 tablet (1 mg total) by mouth daily as needed for Anxiety. 30 tablet 5 Unknown at Unknown time    clopidogrel (PLAVIX) 75 mg tablet Take 1 tablet (75 mg total) by mouth once daily. 90 tablet 2 Unknown at Unknown time    diphth,pertus,acell,,tetanus (BOOSTRIX) 2.5-8-5 Lf-mcg-Lf/0.5mL Susp Inject 0.5 ml into the muscle once. 0.5 mL 0 Taking    donepezil (ARICEPT) 10 MG tablet Take 1 tablet (10 mg total) by mouth 2 (two) times daily. 180 tablet 3 Unknown at Unknown time    LORazepam (ATIVAN) 1 MG tablet Take 1 tablet (1 mg total) by mouth as needed for Anxiety. 1 tablet 0 Taking    topiramate (TOPAMAX) 100 MG tablet Take 2 tablets (200 mg total) by mouth 2 (two) times daily. 120 tablet 12 Taking     triamcinolone acetonide 0.1% (KENALOG) 0.1 % cream AAA bid to rash up to 2 weeks 60 g 1 Unknown at Unknown time       Review of Systems   Constitutional: Negative for appetite change, chills and unexpected weight change.   HENT: Negative for congestion, hearing loss, nosebleeds and rhinorrhea.    Eyes: Negative for photophobia, discharge and visual disturbance.   Respiratory: Negative for cough and shortness of breath.    Cardiovascular: Negative for chest pain, palpitations and leg swelling.   Gastrointestinal: Negative for abdominal pain, nausea and vomiting.   Endocrine: Negative for polydipsia and polyphagia.   Genitourinary: Negative for dysuria.   Musculoskeletal: Negative for arthralgias and neck stiffness.   Skin: Negative for pallor.   Allergic/Immunologic: Negative for immunocompromised state.   Neurological: Negative for weakness.        LUE and LLE weakness and L fascial heaviness   Psychiatric/Behavioral: Negative for behavioral problems and confusion.     Objective:     Vital Signs (Most Recent):  Temp: 98.4 °F (36.9 °C) (09/29/18 0802)  Pulse: 80 (09/29/18 0802)  Resp: 17 (09/29/18 0802)  BP: (!) 196/106 (09/29/18 0802)  SpO2: 95 % (09/29/18 0619)    Vital Signs Range (Last 24H):  Temp:  [97.8 °F (36.6 °C)-98.4 °F (36.9 °C)]   Pulse:  [56-80]   Resp:  [13-20]   BP: (121-201)/()   SpO2:  [94 %-99 %]     Physical Exam   Constitutional: She is oriented to person, place, and time. She appears well-developed and well-nourished. No distress.   HENT:   Head: Normocephalic and atraumatic.   Eyes: EOM are normal. Pupils are equal, round, and reactive to light. Right eye exhibits no discharge. Left eye exhibits no discharge. No scleral icterus.   Neck: Normal range of motion.   Cardiovascular: Normal rate and regular rhythm.   Pulmonary/Chest: Effort normal and breath sounds normal. No respiratory distress.   Abdominal: Soft. Bowel sounds are normal. There is no tenderness.   Musculoskeletal: Normal range  of motion. She exhibits no deformity.   Neurological: She is alert and oriented to person, place, and time. No cranial nerve deficit.   LUE and LLE weakness and decreased in sensation when compare it to the R side.   Skin: Skin is warm and dry. Capillary refill takes less than 2 seconds. She is not diaphoretic.   Psychiatric: She has a normal mood and affect.       Neurological Exam:   LOC: alert and oriented  Attention Span: Good   Language: No aphasia  Articulation: No dysarthria  Orientation: Person, Place, Time   Visual Fields: Full  EOM (CN III, IV, VI): Full/intact  Pupils (CN II, III): PERRL  Facial Sensation (CN V): Normal on the R side and diminished on the L.   Facial Movement (CN VII): Normal bilaterally  Motor:   Arm left 4/5  Leg left  4/5  Arm right  Normal 5/5  Leg right Normal 5/5  Sensation: Intact to light touch, temperature and vibration  Tone: Normal tone throughout          Laboratory:  [facility]    Diagnostic Results:      Brain imaging:  CTH on 9/28 showed No evidence of acute hemorrhage or major vascular distribution infarct. Small remote right cerebellar infarct.    Vessel Imaging:  None    Cardiac Evaluation:   None

## 2018-09-29 NOTE — HOSPITAL COURSE
Ms Chawla is a 52 yo woman with Afib (briefly off Xarelto for nose bleed), on plavix for stroke prevention, HFrEF w/ CRT-D (non-ischemic?), HTN, here for left hemiparesis and headache. On arrival to ED found to have HTN to 200/122. CTH showed small remote cerebellar infarct, but no acute ischemia or bleed. MRI was unable to be performed due to CRT-D device. She was treated medically with Plavix 75mg, Xarelto, Rosuvastatin 40mg. She has had labile BP since admission, but no other evidence of end-organ damage. BP currently treated with losartan but remains labile. She continues to have a intractable headache (which preceeded her stroke like symptoms by about 4 hours).     9/30- Patient seen today and she is vitally stable. IV Mg, IM Ketorolac and IV morphine were given for HA which resolved. She is for home discharge with home health. F/u with HA clinic for evaluation and assessment.

## 2018-09-29 NOTE — PLAN OF CARE
Problem: Patient Care Overview  Goal: Plan of Care Review  Patient remains free of falls, injuries and traumas. BP elevated. Noted decrease after morning medication. BIN completed. Neuro checks q4 performed. No significant changes noted. Mg 2g in 500cc bolus along w/ Toradol 60 mg IM for migraine relief. CTH in ED showed no evidence of acute hemorrhage or major vascular distribution infarct. Possible discharge on tomorrow. Plan of care reviewed. All questions and concerns addressed. Will continue to monitor.

## 2018-09-29 NOTE — PT/OT/SLP EVAL
Physical Therapy Evaluation    Patient Name:  Amna Chawla   MRN:  [id number]    Recommendations:     Discharge Recommendations:  home health PT   Discharge Equipment Recommendations: (Rolling Walker)   Barriers to discharge: None    Plan:     During this hospitalization, patient to be seen 4 x/week to address the above listed problems via gait training, therapeutic activities, therapeutic exercises, neuromuscular re-education  · Plan of Care Expires:  10/29/18   Plan of Care Reviewed with: patient    History:     Past Medical History:   Diagnosis Date    Anticoagulant long-term use     Anxiety     Asthma     Atrial fibrillation     Brain anoxic injury     Defibrillator activation 2013    Depression     Heart block     History of sudden cardiac arrest 2/2013    PEA arrest with subsequent long-QT    Hx of psychiatric care     Hypertension     Left atrial enlargement 4/11/2018    Pacemaker 2013    Psychiatric problem     Seizures     Sleep difficulties     Stroke     weakness lt side    Therapy     Thyroid disease        Past Surgical History:   Procedure Laterality Date    APPENDECTOMY      breast reduction      BREAST SURGERY      CARDIAC DEFIBRILLATOR PLACEMENT      CARDIAC DEFIBRILLATOR PLACEMENT      CARPAL TUNNEL RELEASE Right     colon  N/A 6/24/2014    Performed by Chemo Bustamante MD at [facility] ENDO (2ND FLR)    COSMETIC SURGERY      egd N/A 6/24/2014    Performed by Chemo Bustamante MD at [facility] ENDO (2ND FLR)    HERNIA REPAIR      HIATAL HERNIA REPAIR      INSERT / REPLACE / REMOVE PACEMAKER  10/2017    CRT-D upgrade    INSERTION, CARDIAC PACEMAKER, DUAL CHAMBER N/A 2/15/2013    Performed by Humberto Martins MD at [facility] CATH LAB    INSERTION-ICD-BIVENTRICULAR N/A 10/13/2017    Performed by Avelino Mejia MD at [facility] CATH LAB    RELEASE-CARPAL TUNNEL Left 2/3/2017    Performed by Matt Pugh Jr., [facility] at [facility] [place]    RELEASE-CARPAL TUNNEL Right 12/9/2016    Performed by Matt ELLER  "Keaton Mcintyre MD at [facility] [place]    TUBAL LIGATION      VENOGRAM N/A 10/13/2017    Performed by Avelino Mejia MD at [facility] CATH LAB       Subjective     Communicated with RN prior to session.    Patient comments/goals: "I have a headache. I told them I have a headache.  This is how it all started, with a headache."  Pain/Comfort:  · Pain Rating 1: 6/10(HA)  · Pain Addressed 1: Nurse notified  · Pain Rating Post-Intervention 1: 6/10(HA)    Living Environment:  Pt lives in [place] with her dtr in an apartment with ramp entrance and elevator access.  Her daughter works, but several family members are with her in and out during the day. Pt states she is seldom alone.  She was independent prior to admit with mobility and ADLs, but does not drive.  She used to own cane, RW and w/c from a previous injury, but she no longer owns these items.    Upon discharge, patient will have assistance from family members.    Objective:     Patient found with: telemetry     General Precautions: Standard, aspiration, fall   Patient was found supine in bed in NAD. She agreed to therapy.     PHYSICAL EXAMINATION  Cognitive Function:  - Oriented to: person, place, time and situation   - Level of Alertness: awake and alert  - Follows Commands/attention: Follows multistep  commands  - Communication: clear/fluent  - Safety awareness/insight to disability: intact  Musculoskeletal System  Upper Extremities:   ROM: WFL  Strength: WFL  Lower Extremities:  ROM: WFL  Strength:  Muscle Group R LE L LE Comments   Hip ext 5/5 4/5    Hip flexion  5/5 3/5       Knee flexion  5/5 4/5       Knee ext. 5/5 4/5    Ankle DF 5/5 4/5    Ankle PF 5/5 4/5    Integumentary System: Visible skin intact  Cardiopulmonary System:   Recent vitals:   Vitals:    09/29/18 0802   BP: (!) 196/106   Pulse: 80   Resp: 17   Temp: 98.4 °F (36.9 °C)   Neuromuscular System:  · Sensation: impaired LT on LUE and LLE  · Coordination:    Finger to thumb opposition: grossly intact "    Finger to nose: grossly intact    Heel to shin: NT due to body habitus  Posture and gross symmetry: rounded shoulders, obesity  Vision:  Tracking in all visual fields, no extinction     BALANCE:  Sitting: independent  Standing: SBA with no AD; modified independent with RW    FUNCTIONAL MOBILITY ASSESSMENT:  Bed Mobility: performed with HOB flat  · Rolling/Turning R: independent  · Rolling/Turning L: independent  · Supine > sit: independent  · Sit > supine: independent   · Scooting EOB: independent    Transfers:  · Sit <> stand transfer: SBA   · Bed <> chair transfer: SBA    Gait:   Gait x 100 feet CGA with gait deviations and 2 episodes of L sided instability/LOB  · Patient demonstrated Trendelenburg gait with instability in L stance and excessive lateral weight shifting (d/t body habitus), and L knee hyperextension moment in terminal stance without any knee buckling.    · Due to the excessive lateral weight shifting and LLE weakness, she is unsteady in L stance phase of gait.   · Comments:   · She nearly missed door frame and laundry cart (both on R side) during gait. On further assessment, vision intact but the patient was unable to move out of the way (to the L) and maintain balance at the same time.       Gait training with RW  Gait x 75 feet SBA using Rw  · She continued to demonstrate Trendelenburg gait pattern in the L, but she had no instability or LOB.   · Good spacing and clearance with object negotiation in the bahena using RW.     Therapeutic Activities, Education, or Exercises:  Therapist educated patient on the role of PT, POC, and therapy recommendations.  Recommend walk only with supervision/nursing assistance. RW ordered for patient room. Recommend RW for home use.   Therapist discussed the patients current mobility status, deficits, and level of assistance with patient and RN.  Time was provided for active listening, discussion of health disposition, and discussion of safe discharge  recommendations. Therapist answered questions to patient/familys satisfaction within scope of practice.  Patient and family are aware of patient's deficits and therapy progression. White board updated to reflect current level of assistance.    FUNCTIONAL OUTCOME MEASURES:   AM-PAC 6 CLICK MOBILITY  Total Score:22       Patient left sitting up in chair with all lines intact, call button in reach and RN notified.    GOALS:   Multidisciplinary Problems     Physical Therapy Goals        Problem: Physical Therapy Goal    Goal Priority Disciplines Outcome Goal Variances Interventions   Physical Therapy Goal     PT, PT/OT Ongoing (interventions implemented as appropriate)     Description:    Goals to be met by 10/9    1. Pt will perform sit to stand transfers with modified independence using RW  2. Pt will perform gait x 200 feet with modified independence using RW.  3. Pt will stand  x 5 minutes with SBA and no LOB while performing dynamic UE tasks to prepare for functional tasks in standing.                       Assessment:     Amna Chawla is a 53 y.o. female admitted with a medical diagnosis of Acute ischemic stroke.  She was independent without an assistive device prior to admit. She now presents with the following impairments/functional limitations:  weakness, gait instability, impaired balance, impaired endurance, impaired self care skills, impaired functional mobilty, decreased lower extremity function.  Due to her impairments, she now requires assistance and the use of a walker for mobility.  Her LLE weakness is causing gait deviations and instability during gait.  She would benefit from  physical therapy to address these deficits and return her to her PLOF.  She will require a rolling walker for home use.     Rehab Prognosis:  good; patient would benefit from acute skilled PT services to address these deficits and reach maximum level of function.      Recent Surgery: * No surgery found *      Clinical  Decision Making:   On examination of body system using standardized tests and measures patient presents with 3 or more elements from any of the following: body structures and functions, activity limitations, and/or participation restrictions.  Leading to a clinical presentation that is considered stable and/or uncomplicated  Evaluation Complexity:  Low- 81556       Time Tracking:     PT Received On: 09/29/18  PT Start Time: 0820     PT Stop Time: 0853  PT Total Time (min): 33 min     Billable Minutes: Evaluation 20 and Gait Training 13      Elvira Trejo, PT  09/29/2018

## 2018-09-29 NOTE — ASSESSMENT & PLAN NOTE
Ms Chawla is a 54 yo woman with Afib (recently off Xarelto per Dr. 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     DDx: migraine, TIA, cardioembolic stroke  Other ddx considered: HTN emergency, carotid stenosis    CTH at ED showed no evidence of acute hemorrhage or major vascular distribution infarct.   MRI unable to perform due to CRT-D device.  ECHO: shows normal EF, biatrial enlargement   CTA (4/2018): bl carotid stenosis <50%, hypoplastic posterior circulation.     -Sx onset was >4.5 hrs, did not receive TPA or mech thrombectomy  -Medical management with Plavix 75mg and Xarelto   -BP is currently labile, on losartan only. Plan for addition of coreg when appropriate for tighter BP control, will trend BP this morning. Also having 10/10 headaches/migraines, may be causing lability.   -On review of prior headache management, will give cocktail as follows: Mag 2g infusion, 500 cc NS bolus over 4 hrs, Toradol 60 mg IM. Also gets benadryl typically, but will hold due to concern for BP lability.

## 2018-09-29 NOTE — CONSULTS
"  [facility]  Adult Nutrition  Consult Note    SUMMARY     Recommendations    Recommendation/Intervention:   1.Adv. diet as medically able to regular w/ thin liq (per SLP).   2.Encourage po intake > 75%.   3.If po intake< 50% Provide Boost Plus TID.   4.RD to follow  Goals: nutrient intake >/= 85% EEN/EPN   Nutrition Goal Status: new  Communication of RD Recs: (POC)    Reason for Assessment    Reason for Assessment: consult  Diagnosis: (Acute ischemic stroke )  Relevant Medical History: HTN, s/p pacemaker 2013, stroke, Thyroid disease  General Information Comments: pt presented w/ left U&LE weakness w/ facial drooping. weakness improved today pt able to work w/ PT. Speech cleared pt for Reg thin diet, currently remains NPO.  Pt denies NVD.  Pt wt is stable x 1 yr and pt show no signs of fat/muscle wasting.  Nutrition Discharge Planning: Cardiac diet w/ adequate po intake    Nutrition Risk Screen    Nutrition Risk Screen: no indicators present    Nutrition/Diet History    Patient Reported Diet/Restrictions/Preferences: general  Do you have any cultural, spiritual, Synagogue conflicts, given your current situation?: none  Food Allergies: NKFA  Factors Affecting Nutritional Intake: NPO    Anthropometrics    Temp: 98.4 °F (36.9 °C)  Height Method: Stated  Height: 5' 4" (162.6 cm)  Height (inches): 64 in  Weight Method: Standard Scale  Weight: 127.5 kg (281 lb 1.4 oz)  Weight (lb): 281.09 lb  Ideal Body Weight (IBW), Female: 120 lb  % Ideal Body Weight, Female (lb): 234.24 lb  BMI (Calculated): 48.3  BMI Grade: greater than 40 - morbid obesity       Lab/Procedures/Meds    Pertinent Labs Reviewed: reviewed  Pertinent Labs Comments: CPK-193, Na-135, Glu-126  Pertinent Medications Reviewed: reviewed  Pertinent Medications Comments: methyl Pred. pantoprazole, statin    Physical Findings/Assessment    Overall Physical Appearance: obese  Oral/Mouth Cavity: all teeth present (age appropriate)  Skin: " intact    Estimated/Assessed Needs    Weight Used For Calorie Calculations: 127.5 kg (281 lb 1.4 oz)  Energy Calorie Requirements (kcal): 2244 kcal/d  Energy Need Method: Estill-St Jeor( x 1.2)  Protein Requirements: 128-179g/d   (1.0-1.2g/kg)  Weight Used For Protein Calculations: 127.5 kg (281 lb 1.4 oz)     Fluid Need Method: RDA Method(or Per MD)  RDA Method (mL): 2244  CHO Requirement: NA      Nutrition Prescription Ordered    Current Diet Order: NPO    Evaluation of Received Nutrient/Fluid Intake    I/O: +I/O Good UOP  Energy Calories Required: not meeting needs  Protein Required: not meeting needs  Fluid Required: not meeting needs  Comments: LBM: 9/26/18  % Intake of Estimated Energy Needs: 0 - 25 %  % Meal Intake: NPO    Nutrition Risk    Level of Risk/Frequency of Follow-up: high     Assessment and Plan    Nutrition Problem  Inadequate oral intake    Related to (etiology):   NPO    Signs and Symptoms (as evidenced by):   Pt NPO w/ no other means of nutritional intake at this time    Interventions/Recommendations (treatment strategy):  1.Adv. diet as medically able to regular w/ thin liq (per SLP).   2.Encourage po intake > 75%.   3.If po intake< 50% Provide Boost Plus TID.   4.RD to follow    Nutrition Diagnosis Status:   New       Monitor and Evaluation    Food and Nutrient Intake: energy intake, food and beverage intake  Food and Nutrient Adminstration: diet order  Anthropometric Measurements: weight, weight change  Biochemical Data, Medical Tests and Procedures: (all labs)  Nutrition-Focused Physical Findings: overall appearance     Nutrition Follow-Up    RD Follow-up?: Yes

## 2018-09-29 NOTE — NURSING
Patient admitted to  from ED.  Arrived to floor with nurse via wheelchair.  VSS except BP elevated. Patient has 10/10 headache.  Emeli Silva, NP aware.  Patient oriented to room, bed in lowest position, belongings at bedside, call bell within reach.  Plan of care discussed with patient.  Will continue to monitor.

## 2018-09-29 NOTE — NURSING
Spoke with MD in regards to patient report of unrelieved headache 10/10, patients concern about her nightly Ambien, and patients NPO status. MD instructed she will review patients chart and place further instructions. Will continue to monitor patient.

## 2018-09-29 NOTE — PLAN OF CARE
Problem: Patient Care Overview  Goal: Plan of Care Review  Outcome: Ongoing (interventions implemented as appropriate)  Patient free from falls and injuries throughout shift.  AAO and VSS except for elevated BP.   BP goal is 130/80; notify MD if BP exceeds systolic 220 and/or diastolic 110.   Patient denies chest pain and SOB. Patient reports a headache 10/10; medication given before transfer to unit.  No acute events overnight. Patient resting well at this time.  Plan of care discussed with patient.  Patient verbalizes understanding.  Will continue to monitor.

## 2018-09-29 NOTE — PLAN OF CARE
Problem: Occupational Therapy Goal  Goal: Occupational Therapy Goal  Goals to be met by: 10/6/18    Patient will increase functional independence with ADLs by performing:    UE Dressing with Supervision.  LE Dressing with Supervision.  Grooming while standing at sink with Set-up Assistance.  Toileting from toilet with Supervision for hygiene and clothing management.   Supine to sit with Levels.  Step transfer with Supervision  Toilet transfer to toilet with Supervision.    Outcome: Ongoing (interventions implemented as appropriate)  Evaluation completed and POC established.    YUDI Yeboah

## 2018-09-29 NOTE — ASSESSMENT & PLAN NOTE
-she is on Xarelto and Plavix at home  -she is complaint with med. Was briefly off of Xarelto for nose bleed. If recurrence of nose bleed prefer embolization rather than removing Anticoagulation.

## 2018-09-29 NOTE — ASSESSMENT & PLAN NOTE
-Patient c/o HA that began at ~0230 and progressed to extremity weakness prompting her ED visit.  Took her migraine medication at the time w/o relief.  Remains w/a HA unrelieved by prn meds.  -Review of the patient's medical record reveals she saw Dr. Cortez in the [facility] on 8/02/18.  At that time HA control recommendations were made, see note.  -Solumedrol, Magnesium, Fioricet, and Zanaflex given x1 in ED  -Will give Mg infusion w/ 500 cc bolus, and toradol. No benedryl due to c/w BP.

## 2018-09-29 NOTE — ASSESSMENT & PLAN NOTE
-Patient c/o HA that began at ~0230 and progressed to extremity weakness prompting her ED visit.  Took her migraine medication at the time w/o relief.  Remains w/a HA unrelieved by prn meds.  -Review of the patient's medical record reveals she saw Dr. Cortez in the [facility] on 8/02/18.  At that time HA control recommendations were made, see note.  -Solumedrol, Magnesium, Fioricet, and Zanaflex given x1

## 2018-09-29 NOTE — PLAN OF CARE
Problem: Physical Therapy Goal  Goal: Physical Therapy Goal  Outcome: Ongoing (interventions implemented as appropriate)  Initial eval completed.  Results, POC, and therapy recommendations discussed with patient.   Complete evaluation documentation to follow.    Mobility Recommendations: safe to walk with 1 person assist (L side), RW if available  D/c recommendations: HH PT  DME needs: VIKY Trejo, PT  9/29/2018  [phone] (pager)

## 2018-09-29 NOTE — PT/OT/SLP EVAL
Occupational Therapy   Evaluation    Name: Amna Chawla  MRN: [id number]  Admitting Diagnosis:  Acute ischemic stroke      Recommendations:     Discharge Recommendations: home health OT  Discharge Equipment Recommendations:  walker, rolling  Barriers to discharge:  Decreased caregiver support    History:     Occupational Profile:  Living Environment: Pt lives with her daughters (11 & 31) in a ground floor apt with 3 steps as well as a ramp. Pt has both a walk-in and tub setup.  Previous level of function: (I) with ADLs/mobility. (-) working/driving.  Equipment Used at Home:  none  Assistance upon Discharge: daughter    Past Medical History:   Diagnosis Date    Anticoagulant long-term use     Anxiety     Asthma     Atrial fibrillation     Brain anoxic injury     Defibrillator activation 2013    Depression     Heart block     History of sudden cardiac arrest 2/2013    PEA arrest with subsequent long-QT    Hx of psychiatric care     Hypertension     Left atrial enlargement 4/11/2018    Pacemaker 2013    Psychiatric problem     Seizures     Sleep difficulties     Stroke     weakness lt side    Therapy     Thyroid disease        Past Surgical History:   Procedure Laterality Date    APPENDECTOMY      breast reduction      BREAST SURGERY      CARDIAC DEFIBRILLATOR PLACEMENT      CARDIAC DEFIBRILLATOR PLACEMENT      CARPAL TUNNEL RELEASE Right     colon  N/A 6/24/2014    Performed by [facility]ante [facility] at [facility] ENDO (2ND FLR)    COSMETIC SURGERY      egd N/A 6/24/2014    Per[facility] ENDO (2ND FLR)    HERNIA REPAIR      HIATAL HERNIA REPAIR      INSERT / REPLACE / REMOVE PACEMAKER  10/2017    CRT-D upgrade    INSERTION, CARDIAC PACEMAKER, DUAL CHAMBER N/A 2/15/2013    Performed by Humberto Martins [facility] at [facility] CATH LAB    INSERTION-ICD-BIVENTRICULAR N/A 10/13/2017    Performed by Avelino Mejia MD at [facility] CATH LAB    RELEASE-CARPAL TUNNEL Left 2/3/2017    Performed by Matt  RAFITA Pugh Jr., [facility] at [facility] OR    RELEASE-CARPAL TUNNEL Right 12/9/2016    Performed by Matt Pugh Jr., [facility] at [facility] OR    TUBAL LIGATION      VENOGRAM N/A 10/13/2017    Per[facility] by [facility] at [facility] CATH LAB       Subjective     Pain/Comfort:  · Pain Rating 1: 10/10  · Location 1: head    Patients cultural, spiritual, Hinduism conflicts given the current situation:      Objective:     Communicated with: RN prior to session.  Patient found all lines intact and call button in reach and   upon OT entry to room.    General Precautions: Standard, fall   Orthopedic Precautions:    Braces:       Occupational Performance:    Bed Mobility:    · Patient completed Rolling/Turning to Right with supervision  · Patient completed Scooting/Bridging with supervision  · Patient completed Supine to Sit with supervision  · Patient completed Sit to Supine with supervision    Functional Mobility/Transfers:  · Patient completed Sit <> Stand Transfer with stand by assistance  with  no assistive device   · Patient completed Toilet Transfer Step Transfer technique with stand by assistance with  grab bars  · Functional Mobility: Pt walked within room SBA with no AD.    Activities of Daily Living:  · Grooming: supervision seated  · Upper Body Dressing: stand by assistance   · Lower Body Dressing: stand by assistance     Cognitive/Visual Perceptual:  Cognitive/Psychosocial Skills:     -       Oriented to: Person, Place, Time and Situation   -       Safety awareness/insight to disability: intact     Physical Exam:  BUE AROM: WNL / LUE MMT: 4/5 grossly    AMPAC 6 Click ADL:  AMPAC Total Score: 22    Treatment & Education:  UE ROM/MMT  Bed mobility training / assessment  Functional mobility assessment  Sit/standing balance assessment  Educated on importance of sitting OOB in bedside chair to promote increased strength, endurance & breathing.  Discussed OT POC / Post-acute plan  Education:    Patient left supine with all lines  "intact and call button in reach    Assessment:     Amna Chawla is a 53 y.o. female with a medical diagnosis of Acute ischemic stroke.  She presents with the following performance deficits affecting function: weakness, impaired endurance, impaired self care skills, impaired balance, gait instability, decreased upper extremity function.  Pt. currently demonstrates decreased (I) with ADLs, functional mobility & t/fs as well as decreased overall strength, ROM, endurance and balance. Pt would benefit from skilled OT services as well as HHOT to address these deficits and to facilitate improving (I) with daily tasks.    Rehab Prognosis: Good; patient would benefit from acute skilled OT services to address these deficits and reach maximum level of function.         Clinical Decision Makin.  OT Low:  "Pt evaluation falls under low complexity for evaluation coding due to performance deficits noted in 1-3 areas as stated above and 0 co-morbities affecting current functional status. Data obtained from problem focused assessments. No modifications or assistance was required for completion of evaluation. Only brief occupational profile and history review completed."     Plan:     Patient to be seen 4 x/week to address the above listed problems via self-care/home management, therapeutic activities, therapeutic exercises  · Plan of Care Expires: 10/29/18  · Plan of Care Reviewed with: patient    This Plan of care has been discussed with the patient who was involved in its development and understands and is in agreement with the identified goals and treatment plan    GOALS:   Multidisciplinary Problems     Occupational Therapy Goals        Problem: Occupational Therapy Goal    Goal Priority Disciplines Outcome Interventions   Occupational Therapy Goal     OT, PT/OT Ongoing (interventions implemented as appropriate)    Description:  Goals to be met by: 10/6/18    Patient will increase functional independence with ADLs by " performing:    UE Dressing with Supervision.  LE Dressing with Supervision.  Grooming while standing at sink with Set-up Assistance.  Toileting from toilet with Supervision for hygiene and clothing management.   Supine to sit with [place].  Step transfer with Supervision  Toilet transfer to toilet with Supervision.                      Time Tracking:     OT Date of Treatment: 09/29/18  OT Start Time: 1054  OT Stop Time: 1105  OT Total Time (min): 11 min    Billable Minutes:Evaluation 11    YUDI Reyes  9/29/2018

## 2018-09-30 ENCOUNTER — EXTERNAL CHRONIC CARE MANAGEMENT (OUTPATIENT)
Dept: PRIMARY CARE CLINIC | Facility: CLINIC | Age: 53
End: 2018-09-30
Payer: MEDICARE

## 2018-09-30 VITALS
RESPIRATION RATE: 16 BRPM | SYSTOLIC BLOOD PRESSURE: 177 MMHG | HEART RATE: 79 BPM | DIASTOLIC BLOOD PRESSURE: 81 MMHG | TEMPERATURE: 98 F | HEIGHT: 64 IN | BODY MASS INDEX: 47.98 KG/M2 | WEIGHT: 281.06 LBS | OXYGEN SATURATION: 95 %

## 2018-09-30 LAB
ALBUMIN SERPL BCP-MCNC: 2.8 G/DL
ALP SERPL-CCNC: 66 U/L
ALT SERPL W/O P-5'-P-CCNC: 17 U/L
ANION GAP SERPL CALC-SCNC: 8 MMOL/L
AST SERPL-CCNC: 11 U/L
BASOPHILS # BLD AUTO: 0.01 K/UL
BASOPHILS NFR BLD: 0.1 %
BILIRUB SERPL-MCNC: 0.1 MG/DL
BUN SERPL-MCNC: 21 MG/DL
CALCIUM SERPL-MCNC: 8.9 MG/DL
CHLORIDE SERPL-SCNC: 109 MMOL/L
CO2 SERPL-SCNC: 23 MMOL/L
CREAT SERPL-MCNC: 0.9 MG/DL
DIFFERENTIAL METHOD: ABNORMAL
EOSINOPHIL # BLD AUTO: 0 K/UL
EOSINOPHIL NFR BLD: 0 %
ERYTHROCYTE [DISTWIDTH] IN BLOOD BY AUTOMATED COUNT: 15.8 %
EST. GFR  (AFRICAN AMERICAN): >60 ML/MIN/1.73 M^2
EST. GFR  (NON AFRICAN AMERICAN): >60 ML/MIN/1.73 M^2
GLUCOSE SERPL-MCNC: 119 MG/DL
HCT VFR BLD AUTO: 35.3 %
HGB BLD-MCNC: 11.8 G/DL
IMM GRANULOCYTES # BLD AUTO: 0.02 K/UL
IMM GRANULOCYTES NFR BLD AUTO: 0.2 %
LYMPHOCYTES # BLD AUTO: 1.7 K/UL
LYMPHOCYTES NFR BLD: 19.2 %
MCH RBC QN AUTO: 30 PG
MCHC RBC AUTO-ENTMCNC: 33.4 G/DL
MCV RBC AUTO: 90 FL
MONOCYTES # BLD AUTO: 0.8 K/UL
MONOCYTES NFR BLD: 9.5 %
NEUTROPHILS # BLD AUTO: 6.1 K/UL
NEUTROPHILS NFR BLD: 71 %
NRBC BLD-RTO: 0 /100 WBC
PLATELET # BLD AUTO: 202 K/UL
PMV BLD AUTO: 12.3 FL
POTASSIUM SERPL-SCNC: 3.8 MMOL/L
PROT SERPL-MCNC: 8.6 G/DL
RBC # BLD AUTO: 3.93 M/UL
SODIUM SERPL-SCNC: 140 MMOL/L
WBC # BLD AUTO: 8.59 K/UL

## 2018-09-30 PROCEDURE — 85025 COMPLETE CBC W/AUTO DIFF WBC: CPT

## 2018-09-30 PROCEDURE — 36415 COLL VENOUS BLD VENIPUNCTURE: CPT

## 2018-09-30 PROCEDURE — A4216 STERILE WATER/SALINE, 10 ML: HCPCS | Performed by: STUDENT IN AN ORGANIZED HEALTH CARE EDUCATION/TRAINING PROGRAM

## 2018-09-30 PROCEDURE — 63600175 PHARM REV CODE 636 W HCPCS: Performed by: PSYCHIATRY & NEUROLOGY

## 2018-09-30 PROCEDURE — 90471 IMMUNIZATION ADMIN: CPT | Performed by: PSYCHIATRY & NEUROLOGY

## 2018-09-30 PROCEDURE — 99490 CHRNC CARE MGMT STAFF 1ST 20: CPT | Mod: PBBFAC | Performed by: PSYCHIATRY & NEUROLOGY

## 2018-09-30 PROCEDURE — 3E0234Z INTRODUCTION OF SERUM, TOXOID AND VACCINE INTO MUSCLE, PERCUTANEOUS APPROACH: ICD-10-PCS | Performed by: PSYCHIATRY & NEUROLOGY

## 2018-09-30 PROCEDURE — 93005 ELECTROCARDIOGRAM TRACING: CPT

## 2018-09-30 PROCEDURE — 25000003 PHARM REV CODE 250: Performed by: STUDENT IN AN ORGANIZED HEALTH CARE EDUCATION/TRAINING PROGRAM

## 2018-09-30 PROCEDURE — G0008 ADMIN INFLUENZA VIRUS VAC: HCPCS | Performed by: PSYCHIATRY & NEUROLOGY

## 2018-09-30 PROCEDURE — 90686 IIV4 VACC NO PRSV 0.5 ML IM: CPT | Performed by: PSYCHIATRY & NEUROLOGY

## 2018-09-30 PROCEDURE — 93010 ELECTROCARDIOGRAM REPORT: CPT | Mod: ,,, | Performed by: INTERNAL MEDICINE

## 2018-09-30 PROCEDURE — 99233 SBSQ HOSP IP/OBS HIGH 50: CPT | Mod: ,,, | Performed by: PSYCHIATRY & NEUROLOGY

## 2018-09-30 PROCEDURE — 80053 COMPREHEN METABOLIC PANEL: CPT

## 2018-09-30 PROCEDURE — 25000003 PHARM REV CODE 250: Performed by: NURSE PRACTITIONER

## 2018-09-30 PROCEDURE — 99490 CHRNC CARE MGMT STAFF 1ST 20: CPT | Mod: S$PBB,,, | Performed by: PSYCHIATRY & NEUROLOGY

## 2018-09-30 RX ORDER — AMOXICILLIN 250 MG
1 CAPSULE ORAL ONCE
Status: COMPLETED | OUTPATIENT
Start: 2018-09-30 | End: 2018-09-30

## 2018-09-30 RX ADMIN — STANDARDIZED SENNA CONCENTRATE AND DOCUSATE SODIUM 1 TABLET: 8.6; 5 TABLET, FILM COATED ORAL at 02:09

## 2018-09-30 RX ADMIN — CLOPIDOGREL 75 MG: 75 TABLET, FILM COATED ORAL at 09:09

## 2018-09-30 RX ADMIN — LOSARTAN POTASSIUM 50 MG: 50 TABLET ORAL at 09:09

## 2018-09-30 RX ADMIN — PANTOPRAZOLE SODIUM 40 MG: 40 TABLET, DELAYED RELEASE ORAL at 09:09

## 2018-09-30 RX ADMIN — GABAPENTIN 900 MG: 300 CAPSULE ORAL at 09:09

## 2018-09-30 RX ADMIN — GABAPENTIN 900 MG: 300 CAPSULE ORAL at 02:09

## 2018-09-30 RX ADMIN — Medication 3 ML: at 02:09

## 2018-09-30 RX ADMIN — INFLUENZA A VIRUS A/MICHIGAN/45/2015 X-275 (H1N1) ANTIGEN (FORMALDEHYDE INACTIVATED), INFLUENZA A VIRUS A/SINGAPORE/INFIMH-16-0019/2016 IVR-186 (H3N2) ANTIGEN (FORMALDEHYDE INACTIVATED), INFLUENZA B VIRUS B/PHUKET/3073/2013 ANTIGEN (FORMALDEHYDE INACTIVATED), AND INFLUENZA B VIRUS B/MARYLAND/15/2016 BX-69A ANTIGEN (FORMALDEHYDE INACTIVATED) 0.5 ML: 15; 15; 15; 15 INJECTION, SUSPENSION INTRAMUSCULAR at 05:09

## 2018-09-30 RX ADMIN — CARVEDILOL 12.5 MG: 12.5 TABLET, FILM COATED ORAL at 09:09

## 2018-09-30 RX ADMIN — RIVAROXABAN 20 MG: 20 TABLET, FILM COATED ORAL at 05:09

## 2018-09-30 NOTE — PLAN OF CARE
[facility]    HOME HEALTH ORDERS  FACE TO FACE ENCOUNTER    Patient Name: Amna Chawla  YOB: 1965    PCP: Sam Allred MD   PCP Address: [address] / [place] [address]  PCP Phone Number: [phone]  PCP Fax: [phone]    Encounter Date: 09/30/2018    Admit to Home Health    Diagnoses:  Active Hospital Problems    Diagnosis  POA    *Acute ischemic stroke [I63.9]  Yes    Long term (current) use of anticoagulants [Z79.01]  Not Applicable    Paroxysmal atrial fibrillation [I48.0]  Yes    Headaches due to old head injury [G44.309, S09.90XS]  Not Applicable     Chronic    Essential hypertension [I10]  Yes     Chronic    History of CVA (cerebrovascular accident) [Z86.73]  Not Applicable    HLD (hyperlipidemia) [E78.5]  Yes     Chronic      Resolved Hospital Problems   No resolved problems to display.       Future Appointments   Date Time Provider Department Center   10/3/2018  1:15 PM [facility] OIC-MAMMO2 [facility]   10/3/2018  2:00 PM EKG, APPT [facility] EKG [facility]   10/3/2018  3:20 PM Avelino Mejia MD [facility] [address]   10/15/2018  2:00 PM Sam Allred MD [facility] IM [address] PCW   10/17/2018 11:00 AM David Cortez MD [facility] EPI [address]   10/25/2018  8:30 AM Tiffany Mina MD [facility] IM [address] PCW   11/12/2018  8:00 AM HOME MONITOR DEVICE CHECK, [facility] [address]   2/5/2019  9:00 AM Missy Rock MD [facility]   2/11/2019  8:00 AM HOME MONITOR DEVICE CHECK, [facility] [address]   5/13/2019  8:00 AM HOME MONITOR DEVICE CHECK, [facility] NOM [facility]RHY[facility] [address]     Follow-up Information     Schedule an appointment as soon as possible for a visit with David Cortez MD.    Specialty:  Neurology  Why:  Headache evaluation  Contact information:  [address]  [facility] [address]  [phone]                     I have seen and examined this patient face to face today. My clinical findings that support the need for the home  health skilled services and home bound status are the following:  Weakness/numbness causing balance and gait disturbance due to Weakness/Debility making it taxing to leave home.    Allergies:  Review of patient's allergies indicates:   Allergen Reactions    Aspirin Hives    Imitrex [sumatriptan] Palpitations    Penicillins Hives and Swelling    Shellfish containing products Anaphylaxis     seafood    Percocet [oxycodone-acetaminophen] Itching     States can take    Reglan [metoclopramide hcl] Other (See Comments)     Parkinsonism        Diet: cardiac diet    Activities: activity as tolerated    Nursing:   SN to complete comprehensive assessment including routine vital signs. Instruct on disease process and s/s of complications to report to MD. Review/verify medication list sent home with the patient at time of discharge  and instruct patient/caregiver as needed. Frequency may be adjusted depending on start of care date.    Notify MD if SBP > 160 or < 90; DBP > 90 or < 50; HR > 120 or < 50; Temp > 101      CONSULTS:    Physical Therapy to evaluate and treat. Evaluate for home safety and equipment needs; Establish/upgrade home exercise program. Perform / instruct on therapeutic exercises, gait training, transfer training, and Range of Motion.  Occupational Therapy to evaluate and treat. Evaluate home environment for safety and equipment needs. Perform/Instruct on transfers, ADL training, ROM, and therapeutic exercises.  Speech Therapy  to evaluate and treat for  Swallowing.   to evaluate for community resources/long-range planning.    MISCELLANEOUS CARE:  N/A    WOUND CARE ORDERS  n/a      Medications: Review discharge medications with patient and family and provide education.      Current Discharge Medication List      CONTINUE these medications which have NOT CHANGED    Details   ARIPiprazole (ABILIFY) 5 MG Tab Take 1 tablet (5 mg total) by mouth every evening.  Qty: 30 tablet, Refills: 5     Associated Diagnoses: Depression, major, recurrent, moderate      baclofen (LIORESAL) 20 MG tablet Take 1 tablet (20 mg total) by mouth 3 (three) times daily. for 10 days  Qty: 30 tablet, Refills: 0    Associated Diagnoses: Muscle spasm of back      carvedilol (COREG) 25 MG tablet TAKE 1 TABLET(25 MG) BY MOUTH TWICE DAILY WITH MEALS  Qty: 180 tablet, Refills: 3    Comments: **Patient requests 90 days supply**  Associated Diagnoses: Essential hypertension; Mixed hyperlipidemia; Paroxysmal atrial fibrillation; Nonischemic cardiomyopathy      DULoxetine (CYMBALTA) 60 MG capsule Take 1 capsule (60 mg total) by mouth 2 (two) times daily.  Qty: 60 capsule, Refills: 5    Associated Diagnoses: Depression, major, recurrent, moderate      erenumab-aooe (AIMOVIG AUTOINJECTOR) 70 mg/mL AtIn Inject 70 mg into the skin every 30 days.  Qty: 1 mL, Refills: 6    Associated Diagnoses: Chronic migraine without aura, with intractable migraine, so stated, with status migrainosus      gabapentin (NEURONTIN) 300 MG capsule Take 3 capsules (900 mg total) by mouth 3 (three) times daily.  Qty: 810 capsule, Refills: 12    Comments: **Patient requests 90 days supply**  Associated Diagnoses: Chronic migraine without aura, with intractable migraine, so stated, with status migrainosus      hydrOXYzine (ATARAX) 50 MG tablet Take 1 tablet (50 mg total) by mouth nightly as needed.  Qty: 30 tablet, Refills: 5    Associated Diagnoses: Insomnia, unspecified type      losartan (COZAAR) 50 MG tablet Take 1 tablet (50 mg total) by mouth once daily.  Qty: 90 tablet, Refills: 3    Associated Diagnoses: Essential hypertension      magnesium 200 mg Tab Take 200 mg by mouth once daily.  Qty: 30 each, Refills: 12      meloxicam (MOBIC) 15 MG tablet Take 1 tablet (15 mg total) by mouth once daily.  Qty: 30 tablet, Refills: 3      ondansetron (ZOFRAN-ODT) 4 MG TbDL Take 1 tablet (4 mg total) by mouth every 12 (twelve) hours as needed (nausea).  Qty: 10 tablet,  Refills: 1    Associated Diagnoses: Chronic migraine without aura, with intractable migraine, so stated, with status migrainosus; Upper airway resistance syndrome      pantoprazole (PROTONIX) 40 MG tablet Take 1 tablet (40 mg total) by mouth once daily.  Qty: 30 tablet, Refills: 11    Associated Diagnoses: Gastroesophageal reflux disease without esophagitis      rivaroxaban (XARELTO) 20 mg Tab Take 1 tablet (20 mg total) by mouth daily with dinner or evening meal.  Qty: 30 tablet, Refills: 11      rosuvastatin (CRESTOR) 40 MG Tab Take 1 tablet (40 mg total) by mouth every evening.  Qty: 90 tablet, Refills: 3    Associated Diagnoses: Essential hypertension; Mixed hyperlipidemia; Paroxysmal atrial fibrillation; Nonischemic cardiomyopathy      tiaGABine (GABITRIL) 4 MG tablet Take 1 tablet (4 mg total) by mouth every evening.  Qty: 30 tablet, Refills: 12      zolpidem (AMBIEN CR) 12.5 MG CR tablet TAKE 1 TABLET BY MOUTH NIGHTLY AS NEEDED FOR INSOMNIA  Qty: 30 tablet, Refills: 0    Associated Diagnoses: Insomnia, unspecified type      cetirizine (ZYRTEC) 10 MG tablet Take 1 tablet (10 mg total) by mouth once daily.  Qty: 90 tablet, Refills: 11    Associated Diagnoses: Epistaxis      clonazePAM (KLONOPIN) 1 MG tablet Take 1 tablet (1 mg total) by mouth daily as needed for Anxiety.  Qty: 30 tablet, Refills: 5    Associated Diagnoses: Anxiety      clopidogrel (PLAVIX) 75 mg tablet Take 1 tablet (75 mg total) by mouth once daily.  Qty: 90 tablet, Refills: 2    Comments: **Patient requests 90 days supply**      donepezil (ARICEPT) 10 MG tablet Take 1 tablet (10 mg total) by mouth 2 (two) times daily.  Qty: 180 tablet, Refills: 3    Associated Diagnoses: Status migrainosus; Vertigo; Leg pain, left; Cognitive impairment      triamcinolone acetonide 0.1% (KENALOG) 0.1 % cream AAA bid to rash up to 2 weeks  Qty: 60 g, Refills: 1    Associated Diagnoses: Eczema, unspecified type         STOP taking these medications        diphth,pertus,acell,,tetanus (BOOSTRIX) 2.5-8-5 Lf-mcg-Lf/0.5mL Susp Comments:   Reason for Stopping:         LORazepam (ATIVAN) 1 MG tablet Comments:   Reason for Stopping:         topiramate (TOPAMAX) 100 MG tablet Comments:   Reason for Stopping:               I certify that this patient is confined to her home and needs physical therapy, speech therapy and occupational therapy.

## 2018-09-30 NOTE — NURSING
Patient alarming asystole. Checked in again. Patient reports the same sharp pain in left chest. /88 O2 98% on room air and HR 77. Brandon SHIELDS aware.  EKG ordered. Will continue to monitor.

## 2018-09-30 NOTE — NURSING
Patient reports minimal relief from Migraine cocktail. Morphine not included in initial dose. Ella SHIELDS notified, Lino WALLACE aware.

## 2018-09-30 NOTE — PROGRESS NOTES
[facility]  Vascular Neurology  Comprehensive Stroke Center  Progress Note    Assessment/Plan:     * Acute ischemic stroke    Ms Chawla is a 52 yo woman with Afib (recently off Xarelto per Dr. 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     DDx: migraine, cardioembolic stroke  Other ddx considered: HTN emergency, carotid stenosis, TIA    CTH at ED showed no evidence of acute hemorrhage or major vascular distribution infarct.   MRI unable to perform due to CRT-D device.  ECHO: shows normal EF, biatrial enlargement   CTA (4/2018): bl carotid stenosis <50%, hypoplastic posterior circulation.     -Sx onset was >4.5 hrs, [facility]  -Medical management with Plavix 75mg and Xarelto  -BP is labile despite she is on Carvedilol 12.5 BID and Losartan 50. F/u with HA clinic at time of discharge.  -Diet started.   -Patient has no more HA after she received HA cocktail (IV Mg, IM Ketorolac and IV morphine).  -Ready for discharge with home health.            Long term (current) use of anticoagulants    -she is on Xarelto and Plavix at home  -she is complaint with med. Was briefly off of Xarelto for nose bleed. If recurrence of nose bleed prefer embolization rather than removing Anticoagulation.           Paroxysmal atrial fibrillation    AC with Xarelto  Also on plavix for stroke prevention        Headaches due to old head injury    -Patient c/o HA that began at ~0230 and progressed to extremity weakness prompting her ED visit.  Took her migraine medication at the time w/o relief.  Remains w/a HA unrelieved by prn meds.  -Review of the patient's medical record reveals she saw Dr. Cortez in the [facility] on 8/02/18.  At that time HA control recommendations were made, see  note.  -Solumedrol, Magnesium, Fioricet, and Zanaflex given x1 in ED  -Mg infusion w/ 500 cc bolus, and toradol was given.        Essential hypertension    -resume losartan. Restart coreg.   -see above regarding BP control          History of CVA (cerebrovascular accident)    -she had a hx of CVA with L sided weakness on 2013.  -no residual deficits since.  -she reports hx of TIA and R sided weakness on May 2018.  -she is compliant with anticoagulation meds at home.        HLD (hyperlipidemia)    -resume rosuvastatin tablet 40 mg.             Ms Chawla is a 52 yo woman with Afib (briefly off Xarelto for nose bleed), on plavix for stroke prevention, HFrEF w/ CRT-D (non-ischemic?), HTN, here for left hemiparesis and headache. On arrival to ED found to have HTN to 200/122. CTH showed small remote cerebellar infarct, but no acute ischemia or bleed. MRI was unable to be performed due to CRT-D device. She was treated medically with Plavix 75mg, Xarelto, Rosuvastatin 40mg. She has had labile BP since admission, but no other evidence of end-organ damage. BP currently treated with losartan but remains labile. She continues to have a intractable headache (which preceeded her stroke like symptoms by about 4 hours).     9/30- Patient seen today and she is vitally stable. IV Mg, IM Ketorolac and IV morphine were given for HA which resolved. She is for home discharge with home health. F/u with HA clinic for evaluation and assessment.    STROKE DOCUMENTATION   Acute Stroke Times   Last Known Normal Time: 0230  Symptom Onset Date: 09/28/18  Symptom Onset Time: (unknown)  Stroke Team Called Date: 09/28/18  Stroke Team Called Time: 0813  Stroke Team Arrival Date: 09/28/18  Stroke Team Arrival Time: 0820  CT Interpretation Time: 0821    NIH Scale:  1a. Level Of Consciousness: 0-->Alert: keenly responsive  1b. LOC Questions: 0-->Answers both questions correctly  1c. LOC Commands: 0-->Performs both tasks correctly  2. Best Gaze:  0-->Normal  3. Visual: 0-->No visual loss  4. Facial Palsy: 0-->Normal symmetrical movements  5a. Motor Arm, Left: 0-->No drift: limb holds 90 (or 45) degrees for full 10 secs  5b. Motor Arm, Right: 0-->No drift: limb holds 90 (or 45) degrees for full 10 secs  6a. Motor Leg, Left: 0-->No drift: leg holds 30 degree position for full 5 secs  6b. Motor Leg, Right: 0-->No drift: leg holds 30 degree position for full 5 secs  7. Limb Ataxia: 0-->Absent  8. Sensory: 0-->Normal: no sensory loss  9. Best Language: 0-->No aphasia: normal  10. Dysarthria: 0-->Normal  11. Extinction and Inattention (formerly Neglect): 0-->No abnormality  Total (NIH Stroke Scale): 0       Modified [place]    [place] Coma Scale:    ABCD2 Score:    [id number] Score:5  HAS -BLED Score:4  ICH Score:   [employer] Classification:      Hemorrhagic change of an Ischemic Stroke: Does this patient have an ischemic stroke with hemorrhagic changes? No         Past Medical History:   Diagnosis Date    Anticoagulant long-term use     Anxiety     Asthma     Atrial fibrillation     Brain anoxic injury     Defibrillator activation 2013    Depression     Heart block     History of sudden cardiac arrest 2/2013    PEA arrest with subsequent long-QT    Hx of psychiatric care     Hypertension     Left atrial enlargement 4/11/2018    Pacemaker 2013    Psychiatric problem     Seizures     Sleep difficulties     Stroke     weakness lt side    Therapy     Thyroid disease      Past Surgical History:   Procedure Laterality Date    APPENDECTOMY      breast reduction      BREAST SURGERY      CARDIAC DEFIBRILLATOR PLACEMENT      CARDIAC DEFIBRILLATOR PLACEMENT      CARPAL TUNNEL RELEASE Right     colon  N/A 6/24/2014    [facility]O (2ND F[facility])    COSMETIC SURGERY      egd N/A 6/24/2014    [facility] ENDO (2ND FLR)    HERNIA REPAIR      HIATAL HERNIA REPAIR      INSERT / REPLACE / REMOVE  PACEMAKER  10/2017    CRT-D upgrade    INSERTION, CARDIAC PACEMAKER, DUAL CHAMBER N/A 2/15/2013    Per[facility] at [facility] CATH LAB    INSERTION-ICD-BIVENTRICULAR N/A 10/13/2017    Performed by [facility]velino Mejia MD at [facility] CATH LAB    RELEASE-CARPAL TUNNEL Left 2/3/2017    [facility]    RELEASE-CARPAL TUNNEL Right 12/9/2016    [facility] at [facility]    TUBAL LIGATION      VENOGRAM N/A 10/13/2017    Performed by [facility]ia [facility] at [facility] CATH LAB     Family History   Problem Relation Age of Onset    Hypertension Mother     COPD Unknown     Heart failure Unknown     Glaucoma Maternal Grandmother     Glaucoma Paternal Grandmother      Social History     Tobacco Use    Smoking status: Never Smoker    Smokeless tobacco: Never Used   Substance Use Topics    Alcohol use: No    Drug use: No     Review of patient's allergies indicates:   Allergen Reactions    Aspirin Hives    Imitrex [sumatriptan] Palpitations    Penicillins Hives and Swelling    Shellfish containing products Anaphylaxis     seafood    Percocet [oxycodone-acetaminophen] Itching     States can take    Reglan [metoclopramide hcl] Other (See Comments)     Parkinsonism        Medications: I have reviewed the current medication administration record.    Facility-Administered Medications Prior to Admission   Medication Dose Route Frequency Provider Last Rate Last Dose    onabotulinumtoxina injection 200 Units  200 Units Intramuscular Q90 Days David Cortez MD   200 Units at 08/02/18 1112    [DISCONTINUED] ketorolac injection 60 mg  60 mg Intramuscular 1 time in Clinic/HOD David Cortez MD        [DISCONTINUED] onabotulinumtoxina injection 200 Units  200 Units Intramuscular Q90 Days David Cortez MD   200 Units at 05/16/18 1107     Medications Prior to Admission   Medication Sig Dispense Refill Last Dose    ARIPiprazole (ABILIFY) 5 MG Tab Take 1 tablet (5  mg total) by mouth every evening. 30 tablet 5 9/27/2018 at 0800    baclofen (LIORESAL) 20 MG tablet Take 1 tablet (20 mg total) by mouth 3 (three) times daily. for 10 days 30 tablet 0 Past Week at Unknown time    carvedilol (COREG) 25 MG tablet TAKE 1 TABLET(25 MG) BY MOUTH TWICE DAILY WITH MEALS 180 tablet 3 9/27/2018 at 0800    DULoxetine (CYMBALTA) 60 MG capsule Take 1 capsule (60 mg total) by mouth 2 (two) times daily. 60 capsule 5 9/28/2018 at 0800    erenumab-aooe (AIMOVIG AUTOINJECTOR) 70 mg/mL AtIn Inject 70 mg into the skin every 30 days. 1 mL 6 Past Month at Unknown time    gabapentin (NEURONTIN) 300 MG capsule Take 3 capsules (900 mg total) by mouth 3 (three) times daily. 810 capsule 12 9/27/2018 at 2300    hydrOXYzine (ATARAX) 50 MG tablet Take 1 tablet (50 mg total) by mouth nightly as needed. 30 tablet 5 Past Week at Unknown time    losartan (COZAAR) 50 MG tablet Take 1 tablet (50 mg total) by mouth once daily. 90 tablet 3 9/28/2018 at 1700    magnesium 200 mg Tab Take 200 mg by mouth once daily. (Patient taking differently: Take 200 mg by mouth every other day. ) 30 each 12 9/28/2018 at 2100    meloxicam (MOBIC) 15 MG tablet Take 1 tablet (15 mg total) by mouth once daily. 30 tablet 3 Past Week at Unknown time    ondansetron (ZOFRAN-ODT) 4 MG TbDL Take 1 tablet (4 mg total) by mouth every 12 (twelve) hours as needed (nausea). 10 tablet 1 9/27/2018 at 2000    pantoprazole (PROTONIX) 40 MG tablet Take 1 tablet (40 mg total) by mouth once daily. 30 tablet 11 9/28/2018 at 1700    rivaroxaban (XARELTO) 20 mg Tab Take 1 tablet (20 mg total) by mouth daily with dinner or evening meal. 30 tablet 11 9/28/2018 at 1700    rosuvastatin (CRESTOR) 40 MG Tab Take 1 tablet (40 mg total) by mouth every evening. 90 tablet 3 9/28/2018 at 1700    tiaGABine (GABITRIL) 4 MG tablet Take 1 tablet (4 mg total) by mouth every evening. 30 tablet 12 9/27/2018 at 1900    zolpidem (AMBIEN CR) 12.5 MG CR tablet TAKE  1 TABLET BY MOUTH NIGHTLY AS NEEDED FOR INSOMNIA 30 tablet 0 Past Week at Unknown time    cetirizine (ZYRTEC) 10 MG tablet Take 1 tablet (10 mg total) by mouth once daily. 90 tablet 11 Unknown at Unknown time    clonazePAM (KLONOPIN) 1 MG tablet Take 1 tablet (1 mg total) by mouth daily as needed for Anxiety. 30 tablet 5 Unknown at Unknown time    clopidogrel (PLAVIX) 75 mg tablet Take 1 tablet (75 mg total) by mouth once daily. 90 tablet 2 Unknown at Unknown time    donepezil (ARICEPT) 10 MG tablet Take 1 tablet (10 mg total) by mouth 2 (two) times daily. 180 tablet 3 Unknown at Unknown time    triamcinolone acetonide 0.1% (KENALOG) 0.1 % cream AAA bid to rash up to 2 weeks 60 g 1 Unknown at Unknown time    [DISCONTINUED] diphth,pertus,acell,,tetanus (BOOSTRIX) 2.5-8-5 Lf-mcg-Lf/0.5mL Susp Inject 0.5 ml into the muscle once. 0.5 mL 0 Taking    [DISCONTINUED] LORazepam (ATIVAN) 1 MG tablet Take 1 tablet (1 mg total) by mouth as needed for Anxiety. 1 tablet 0 Taking    [DISCONTINUED] topiramate (TOPAMAX) 100 MG tablet Take 2 tablets (200 mg total) by mouth 2 (two) times daily. 120 tablet 12 Taking       Review of Systems   Constitutional: Negative for appetite change, chills and unexpected weight change.   HENT: Negative for congestion, hearing loss, nosebleeds and rhinorrhea.    Eyes: Negative for photophobia, discharge and visual disturbance.   Respiratory: Negative for cough and shortness of breath.    Cardiovascular: Negative for chest pain, palpitations and leg swelling.   Gastrointestinal: Negative for abdominal pain, nausea and vomiting.   Endocrine: Negative for polydipsia and polyphagia.   Genitourinary: Negative for dysuria.   Musculoskeletal: Negative for arthralgias and neck stiffness.   Skin: Negative for pallor.   Allergic/Immunologic: Negative for immunocompromised state.   Neurological: Negative for weakness.        LUE and LLE weakness and L fascial heaviness (improved)    Psychiatric/Behavioral: Negative for behavioral problems and confusion.     Objective:     Vital Signs (Most Recent):  Temp: 97.8 °F (36.6 °C) (09/30/18 1227)  Pulse: 70 (09/30/18 1227)  Resp: 18 (09/30/18 1227)  BP: (!) 177/93 (09/30/18 1227)  SpO2: 98 % (09/30/18 1227)    Vital Signs Range (Last 24H):  Temp:  [97.7 °F (36.5 °C)-98.1 °F (36.7 °C)]   Pulse:  []   Resp:  [16-20]   BP: (134-181)/()   SpO2:  [95 %-98 %]     Physical Exam   Constitutional: She is oriented to person, place, and time. She appears well-developed and well-nourished. No distress.   HENT:   Head: Normocephalic and atraumatic.   Eyes: EOM are normal. Pupils are equal, round, and reactive to light. Right eye exhibits no discharge. Left eye exhibits no discharge. No scleral icterus.   Neck: Normal range of motion.   Cardiovascular: Normal rate and regular rhythm.   Pulmonary/Chest: Effort normal and breath sounds normal. No respiratory distress.   Abdominal: Soft. Bowel sounds are normal. There is no tenderness.   Musculoskeletal: Normal range of motion. She exhibits no deformity.   Neurological: She is alert and oriented to person, place, and time. No cranial nerve deficit.   LUE and LLE weakness and decreased in sensation when compare it to the R side (improved)   Skin: Skin is warm and dry. Capillary refill takes less than 2 seconds. She is not diaphoretic.   Psychiatric: She has a normal mood and affect.       Neurological Exam:   LOC: alert and oriented  Attention Span: Good   Language: No aphasia  Articulation: No dysarthria  Orientation: Person, Place, Time   Visual Fields: Full  EOM (CN III, IV, VI): Full/intact  Pupils (CN II, III): PERRL  Facial Sensation (CN V): Normal bilaterally  Facial Movement (CN VII): Normal bilaterally  Motor:   Arm left 5/5  Leg left  5/5  Arm right  Normal 5/5  Leg right Normal 5/5  Sensation: Intact to light touch, temperature and vibration  Tone: Normal tone  throughout          Laboratory:  [facility]    Diagnostic Results:      Brain imaging:  CTH on 9/28 showed No evidence of acute hemorrhage or major vascular distribution infarct. Small remote right cerebellar infarct.    Vessel Imaging:  None    Cardiac Evaluation:   None      Bry Taylor MD  Comprehensive Stroke Center  Department of Vascular Neurology   [facility]

## 2018-09-30 NOTE — NURSING
Patient discharged home. Patient remains free from falls and injuries through out shift. Patient telemetry removed. IV removed with catheter tip in place. Patient VSS. Discharge instructions, medication list reviewed, and patient verbalizes understanding. Patient awaiting family to arrive shortly. Will continue to monitor.

## 2018-09-30 NOTE — SUBJECTIVE & OBJECTIVE
Past Medical History:   Diagnosis Date    Anticoagulant long-term use     Anxiety     Asthma     Atrial fibrillation     Brain anoxic injury     Defibrillator activation 2013    Depression     Heart block     History of sudden cardiac arrest 2/2013    PEA arrest with subsequent long-QT    Hx of psychiatric care     Hypertension     Left atrial enlargement 4/11/2018    Pacemaker 2013    Psychiatric problem     Seizures     Sleep difficulties     Stroke     weakness lt side    Therapy     Thyroid disease      Past Surgical History:   Procedure Laterality Date    APPENDECTOMY      breast reduction      BREAST SURGERY      CARDIAC DEFIBRILLATOR PLACEMENT      CARDIAC DEFIBRILLATOR PLACEMENT      CARPAL TUNNEL RELEASE Right     colon  N/A 6/24/2014    [facility]formed [facility] ENDO (2ND FLR)    COSMETIC SURGERY      egd N/A 6/24/2014    Performed by [facility] at [facility] ENDO (2ND FLR)    HERNIA REPAIR      HIATAL HERNIA REPAIR      INSERT / REPLACE / REMOVE PACEMAKER  10/2017    CRT-D upgrade    INSERTION, CARDIAC PACEMAKER, DUAL CHAMBER N/A 2/15/2013    Performed by [facility] Martins MD at [facility] CATH LAB    INSERTION-ICD-BIVENTRICULAR N/A 10/13/2017    Performed by A[facility]ino Mejia MD at [facility] CATH LAB    RELEASE-CARPAL TUNNEL Left 2/3/2017    [facility] at [facility]    RELEASE-CARPAL TUNNEL Right 12/9/2016    [facility] at [facility]    TUBAL LIGATION      VENOGRAM N/A 10/13/2017    Performed by [facility]ino [facility]jia MD at [facility] CATH LAB     Family History   Problem Relation Age of Onset    Hypertension Mother     COPD Unknown     Heart failure Unknown     Glaucoma Maternal Grandmother     Glaucoma Paternal Grandmother      Social History     Tobacco Use    Smoking status: Never Smoker    Smokeless tobacco: Never Used   Substance Use Topics    Alcohol use: No    Drug use: No     Review of patient's allergies indicates:    Allergen Reactions    Aspirin Hives    Imitrex [sumatriptan] Palpitations    Penicillins Hives and Swelling    Shellfish containing products Anaphylaxis     seafood    Percocet [oxycodone-acetaminophen] Itching     States can take    Reglan [metoclopramide hcl] Other (See Comments)     Parkinsonism        Medications: I have reviewed the current medication administration record.    Facility-Administered Medications Prior to Admission   Medication Dose Route Frequency Provider Last Rate Last Dose    onabotulinumtoxina injection 200 Units  200 Units Intramuscular Q90 Days David Cortez MD   200 Units at 08/02/18 1112    [DISCONTINUED] ketorolac injection 60 mg  60 mg Intramuscular 1 time in Clinic/HOD David Cortez MD        [DISCONTINUED] onabotulinumtoxina injection 200 Units  200 Units Intramuscular Q90 Days David Cortez MD   200 Units at 05/16/18 1107     Medications Prior to Admission   Medication Sig Dispense Refill Last Dose    ARIPiprazole (ABILIFY) 5 MG Tab Take 1 tablet (5 mg total) by mouth every evening. 30 tablet 5 9/27/2018 at 0800    baclofen (LIORESAL) 20 MG tablet Take 1 tablet (20 mg total) by mouth 3 (three) times daily. for 10 days 30 tablet 0 Past Week at Unknown time    carvedilol (COREG) 25 MG tablet TAKE 1 TABLET(25 MG) BY MOUTH TWICE DAILY WITH MEALS 180 tablet 3 9/27/2018 at 0800    DULoxetine (CYMBALTA) 60 MG capsule Take 1 capsule (60 mg total) by mouth 2 (two) times daily. 60 capsule 5 9/28/2018 at 0800    erenumab-aooe (AIMOVIG AUTOINJECTOR) 70 mg/mL AtIn Inject 70 mg into the skin every 30 days. 1 mL 6 Past Month at Unknown time    gabapentin (NEURONTIN) 300 MG capsule Take 3 capsules (900 mg total) by mouth 3 (three) times daily. 810 capsule 12 9/27/2018 at 2300    hydrOXYzine (ATARAX) 50 MG tablet Take 1 tablet (50 mg total) by mouth nightly as needed. 30 tablet 5 Past Week at Unknown time    losartan (COZAAR) 50 MG tablet Take 1 tablet (50 mg total) by mouth  once daily. 90 tablet 3 9/28/2018 at 1700    magnesium 200 mg Tab Take 200 mg by mouth once daily. (Patient taking differently: Take 200 mg by mouth every other day. ) 30 each 12 9/28/2018 at 2100    meloxicam (MOBIC) 15 MG tablet Take 1 tablet (15 mg total) by mouth once daily. 30 tablet 3 Past Week at Unknown time    ondansetron (ZOFRAN-ODT) 4 MG TbDL Take 1 tablet (4 mg total) by mouth every 12 (twelve) hours as needed (nausea). 10 tablet 1 9/27/2018 at 2000    pantoprazole (PROTONIX) 40 MG tablet Take 1 tablet (40 mg total) by mouth once daily. 30 tablet 11 9/28/2018 at 1700    rivaroxaban (XARELTO) 20 mg Tab Take 1 tablet (20 mg total) by mouth daily with dinner or evening meal. 30 tablet 11 9/28/2018 at 1700    rosuvastatin (CRESTOR) 40 MG Tab Take 1 tablet (40 mg total) by mouth every evening. 90 tablet 3 9/28/2018 at 1700    tiaGABine (GABITRIL) 4 MG tablet Take 1 tablet (4 mg total) by mouth every evening. 30 tablet 12 9/27/2018 at 1900    zolpidem (AMBIEN CR) 12.5 MG CR tablet TAKE 1 TABLET BY MOUTH NIGHTLY AS NEEDED FOR INSOMNIA 30 tablet 0 Past Week at Unknown time    cetirizine (ZYRTEC) 10 MG tablet Take 1 tablet (10 mg total) by mouth once daily. 90 tablet 11 Unknown at Unknown time    clonazePAM (KLONOPIN) 1 MG tablet Take 1 tablet (1 mg total) by mouth daily as needed for Anxiety. 30 tablet 5 Unknown at Unknown time    clopidogrel (PLAVIX) 75 mg tablet Take 1 tablet (75 mg total) by mouth once daily. 90 tablet 2 Unknown at Unknown time    donepezil (ARICEPT) 10 MG tablet Take 1 tablet (10 mg total) by mouth 2 (two) times daily. 180 tablet 3 Unknown at Unknown time    triamcinolone acetonide 0.1% (KENALOG) 0.1 % cream AAA bid to rash up to 2 weeks 60 g 1 Unknown at Unknown time    [DISCONTINUED] diphth,pertus,acell,,tetanus (BOOSTRIX) 2.5-8-5 Lf-mcg-Lf/0.5mL Susp Inject 0.5 ml into the muscle once. 0.5 mL 0 Taking    [DISCONTINUED] LORazepam (ATIVAN) 1 MG tablet Take 1 tablet (1 mg  total) by mouth as needed for Anxiety. 1 tablet 0 Taking    [DISCONTINUED] topiramate (TOPAMAX) 100 MG tablet Take 2 tablets (200 mg total) by mouth 2 (two) times daily. 120 tablet 12 Taking       Review of Systems   Constitutional: Negative for appetite change, chills and unexpected weight change.   HENT: Negative for congestion, hearing loss, nosebleeds and rhinorrhea.    Eyes: Negative for photophobia, discharge and visual disturbance.   Respiratory: Negative for cough and shortness of breath.    Cardiovascular: Negative for chest pain, palpitations and leg swelling.   Gastrointestinal: Negative for abdominal pain, nausea and vomiting.   Endocrine: Negative for polydipsia and polyphagia.   Genitourinary: Negative for dysuria.   Musculoskeletal: Negative for arthralgias and neck stiffness.   Skin: Negative for pallor.   Allergic/Immunologic: Negative for immunocompromised state.   Neurological: Negative for weakness.        LUE and LLE weakness and L fascial heaviness (improved)   Psychiatric/Behavioral: Negative for behavioral problems and confusion.     Objective:     Vital Signs (Most Recent):  Temp: 97.8 °F (36.6 °C) (09/30/18 1227)  Pulse: 70 (09/30/18 1227)  Resp: 18 (09/30/18 1227)  BP: (!) 177/93 (09/30/18 1227)  SpO2: 98 % (09/30/18 1227)    Vital Signs Range (Last 24H):  Temp:  [97.7 °F (36.5 °C)-98.1 °F (36.7 °C)]   Pulse:  []   Resp:  [16-20]   BP: (134-181)/()   SpO2:  [95 %-98 %]     Physical Exam   Constitutional: She is oriented to person, place, and time. She appears well-developed and well-nourished. No distress.   HENT:   Head: Normocephalic and atraumatic.   Eyes: EOM are normal. Pupils are equal, round, and reactive to light. Right eye exhibits no discharge. Left eye exhibits no discharge. No scleral icterus.   Neck: Normal range of motion.   Cardiovascular: Normal rate and regular rhythm.   Pulmonary/Chest: Effort normal and breath sounds normal. No respiratory distress.    Abdominal: Soft. Bowel sounds are normal. There is no tenderness.   Musculoskeletal: Normal range of motion. She exhibits no deformity.   Neurological: She is alert and oriented to person, place, and time. No cranial nerve deficit.   LUE and LLE weakness and decreased in sensation when compare it to the R side (improved)   Skin: Skin is warm and dry. Capillary refill takes less than 2 seconds. She is not diaphoretic.   Psychiatric: She has a normal mood and affect.       Neurological Exam:   LOC: alert and oriented  Attention Span: Good   Language: No aphasia  Articulation: No dysarthria  Orientation: Person, Place, Time   Visual Fields: Full  EOM (CN III, IV, VI): Full/intact  Pupils (CN II, III): PERRL  Facial Sensation (CN V): Normal bilaterally  Facial Movement (CN VII): Normal bilaterally  Motor:   Arm left 5/5  Leg left  5/5  Arm right  Normal 5/5  Leg right Normal 5/5  Sensation: Intact to light touch, temperature and vibration  Tone: Normal tone throughout          Laboratory:  [facility]    Diagnostic Results:      Brain imaging:  CTH on 9/28 showed No evidence of acute hemorrhage or major vascular distribution infarct. Small remote right cerebellar infarct.    Vessel Imaging:  None    Cardiac Evaluation:   None

## 2018-09-30 NOTE — NURSING
Patient cleared for discharge per Brandon SHIELDS. EKG showed no significant changes. Will continue to monitor.

## 2018-09-30 NOTE — PLAN OF CARE
Problem: Patient Care Overview  Goal: Plan of Care Review  Patient remains free of falls, injuries and traumas. BP elevated. Oral HTN meds for control.  Neuro checks q4 performed. Migraine pain managed with PRN morphine. SBP maintained <220 and DBP <110 per order. Pt continues on Plavix and Xarelto PO for stroke prevention. Patient is cleared for discharge.  Plan of care reviewed. All questions and concerns addressed. Will continue to monitor.

## 2018-09-30 NOTE — NURSING
RN spoke with IDA Bar regarding order for continuous infusion of Magnesium 2 gm. Per NP order was one time dose and she will adjust in Epic. Also notified NP pt currently has migraine with no PRN's in system, NP to look through chart and place further orders.

## 2018-09-30 NOTE — ASSESSMENT & PLAN NOTE
Ms Chawla is a 54 yo woman with Afib (recently off Xarelto per Dr. 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     DDx: migraine, cardioembolic stroke  Other ddx considered: HTN emergency, carotid stenosis, TIA    CTH at ED showed no evidence of acute hemorrhage or major vascular distribution infarct.   MRI unable to perform due to CRT-D device.  ECHO: shows normal EF, biatrial enlargement   CTA (4/2018): bl carotid stenosis <50%, hypoplastic posterior circulation.     -Sx onset was >4.5 hrs, did not receive TPA or mech thrombectomy  -Medical management with Plavix 75mg and Xarelto  -BP is labile despite she is on Carvedilol 12.5 BID and Losartan 50. F/u with HA clinic at time of discharge.  -Diet started.   -Patient has no more HA after she received HA cocktail (IV Mg, IM Ketorolac and IV morphine).  -Ready for discharge with home health.

## 2018-09-30 NOTE — DISCHARGE SUMMARY
"[facility]  Vascular Neurology  Comprehensive Stroke Center  Discharge Summary     Summary:     Admit Date: 9/28/2018  8:08 AM    Discharge Date and Time: No discharge date for patient encounter.    Attending Physician: Richmond Ferrell MD     Discharge Provider: Bry Taylor MD    History of Present Illness: Mrs. Chawla is 53 y.o. female with co-morbidities including: HTN (on lisinopril and losartan), HLD (on rosuvastatin), CAD, CVA, seizures, a fib (on Xarelto and Plavix), pacemaker in place presented to the ED with L upper and lower extremity weakness and facial heavihness. She states she woke up at 0230 this AM with a severe R sided HA. She took her headache medication and went back to sleep. She did not get out of the bed till 0630, she reports noticing her symptoms because she was "stumbling". She endorses L upper and lower extremity weakness and L facial heaviness. She reports having a TIA in May 2018, but had her symptoms on the R. She had hx of cardiac arrest and CVA on 2013 with L sided weakness but no residual deficits. CTH at ED showed no evidence of acute hemorrhage or major vascular distribution infarct, Small remote right cerebellar infarct was noticed. She reports that she is complaint with her medications and never missed a dose. Patient admitted to stroke service for physical therapy evaluation and close observation.        Hospital Course (synopsis of major diagnoses, care, treatment, and services provided during the course of the hospital stay): Ms Chawla is a 54 yo woman with Afib (briefly off Xarelto for nose bleed), on plavix for stroke prevention, HFrEF w/ CRT-D (non-ischemic?), HTN, here for left hemiparesis and headache. On arrival to ED found to have HTN to 200/122. CTH showed small remote cerebellar infarct, but no acute ischemia or bleed. MRI was unable to be performed due to CRT-D device. She was treated medically with Plavix 75mg, Xarelto, Rosuvastatin 40mg. " She has had labile BP since admission, but no other evidence of end-organ damage. BP currently treated with losartan but remains labile. She continues to have a intractable headache (which preceeded her stroke like symptoms by about 4 hours).     9/30- Patient seen today and she is vitally stable. IV Mg, IM Ketorolac and IV morphine were given for HA which resolved. She is for home discharge with home health. F/u with HA clinic for evaluation and assessment.    STROKE DOCUMENTATION   Acute Stroke Times   Last Known Normal Time: 0230  Symptom Onset Date: 09/28/18  Symptom Onset Time: (unknown)  Stroke Team Called Date: 09/28/18  Stroke Team Called Time: 0813  Stroke Team Arrival Date: 09/28/18  Stroke Team Arrival Time: 0820  CT Interpretation Time: 0821     NIH Scale:  1a. Level Of Consciousness: 0-->Alert: keenly responsive  1b. LOC Questions: 0-->Answers both questions correctly  1c. LOC Commands: 0-->Performs both tasks correctly  2. Best Gaze: 0-->Normal  3. Visual: 0-->No visual loss  4. Facial Palsy: 0-->Normal symmetrical movements  5a. Motor Arm, Left: 0-->No drift: limb holds 90 (or 45) degrees for full 10 secs  5b. Motor Arm, Right: 0-->No drift: limb holds 90 (or 45) degrees for full 10 secs  6a. Motor Leg, Left: 0-->No drift: leg holds 30 degree position for full 5 secs  6b. Motor Leg, Right: 0-->No drift: leg holds 30 degree position for full 5 secs  7. Limb Ataxia: 0-->Absent  8. Sensory: 0-->Normal: no sensory loss  9. Best Language: 0-->No aphasia: normal  10. Dysarthria: 0-->Normal  11. Extinction and Inattention (formerly Neglect): 0-->No abnormality  Total (NIH Stroke Scale): 0        Modified [place] Coma Scale:    ABCD2 Score:    [id number] Score:5  HAS -BLED Score:4  ICH Score:   [employer] Classification:      Physical Exam   Constitutional: She is oriented to person, place, and time.   She appears well-developed and well-nourished. No distress.   HENT:   Head: Normocephalic and  atraumatic.   Eyes: EOM are normal. Pupils are equal, round, and reactive to light. Right eye exhibits no discharge. Left eye exhibits no discharge. No scleral icterus.   Neck: Normal range of motion.   Cardiovascular: Normal rate and regular rhythm.   Pulmonary/Chest: Effort normal and breath sounds normal. No respiratory distress.   Abdominal: Soft. Bowel sounds are normal. There is no tenderness.   Musculoskeletal: Normal range of motion. She exhibits no deformity.   Neurological: She is alert and oriented to person, place, and time. No cranial nerve deficit.   At admission, she had LUE and LLE weakness and decreased in sensation when compare it to the R side. Symptoms has improved.  Skin: Skin is warm and dry. Capillary refill takes less than 2 seconds. She is not diaphoretic.   Psychiatric: She has a normal mood and affect.         Neurological Exam:   LOC: alert and oriented  Attention Span: Good   Language: No aphasia  Articulation: No dysarthria  Orientation: Person, Place, Time   Visual Fields: Full  EOM (CN III, IV, VI): Full/intact  Pupils (CN II, III): PERRL  Facial Sensation (CN V): Normal on the R side and diminished on the L.  Facial Movement (CN VII): Normal bilaterally  Motor:   Arm left 5/5  Leg left  5/5  Arm right  Normal 5/5  Leg right Normal 5/5  Sensation: Intact to light touch, temperature and vibration  Tone: Normal tone throughout          Assessment/Plan:     Diagnostic Results:      Brain Imaging:   CTH on 9/28 showed No evidence of acute hemorrhage or major vascular distribution infarct. Small remote right cerebellar infarct.    Vessel Imaging:  None    Cardiac Evaluation:   ECHO 9/28    1 - Normal left ventricular systolic function (EF 55-60%).     2 - Biatrial enlargement.     3 - No wall motion abnormalities.     4 - Indeterminate LV diastolic function.     5 - Normal right ventricular systolic function .     6 - The estimated PA systolic pressure is 25 mmHg.     7 - Mild tricuspid  regurgitation.         Interventions: None    Complications: N/A    Disposition:     Final Active Diagnoses:    Diagnosis Date Noted POA    PRINCIPAL PROBLEM:  Acute ischemic stroke [I63.9] 09/28/2018 Yes    Long term (current) use of anticoagulants [Z79.01] 03/10/2017 Not Applicable    Paroxysmal atrial fibrillation [I48.0] 03/09/2017 Yes    Headaches due to old head injury [G44.309, S09.90XS] 01/23/2017 Not Applicable     Chronic    Essential hypertension [I10] 05/30/2015 Yes     Chronic    History of CVA (cerebrovascular accident) [Z86.73] 12/03/2013 Not Applicable    HLD (hyperlipidemia) [E78.5] 09/05/2013 Yes     Chronic      Problems Resolved During this Admission:     * Acute ischemic stroke    Ms Chawla is a 52 yo woman with Afib (recently off Xarelto per Dr. 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     DDx: migraine, cardioembolic stroke  Other ddx considered: HTN emergency, carotid stenosis, TIA    CTH at ED showed no evidence of acute hemorrhage or major vascular distribution infarct.   MRI unable to perform due to CRT-D device.  ECHO: shows normal EF, biatrial enlargement   CTA (4/2018): bl carotid stenosis <50%, hypoplastic posterior circulation.     -Sx onset was >4.5 hrs, did not receive TPA or mech thrombectomy  -Medical management with Plavix 75mg and Xarelto  -BP is labile despite she is on Carvedilol 12.5 BID and Losartan 50. F/u with HA clinic at time of discharge.  -Diet started.   -Patient has no more HA after she received HA cocktail (IV Mg, IM Ketorolac and IV morphine).  -Ready for discharge with home health.            Long term (current) use of anticoagulants    -she is on Xarelto and Plavix at home  -she is complaint with med. Was briefly off of Xarelto for nose bleed. If  recurrence of nose bleed prefer embolization rather than removing Anticoagulation.           Paroxysmal atrial fibrillation    AC with Xarelto  Also on plavix for stroke prevention        Essential hypertension    -resume losartan. Restart coreg.   -see above regarding BP control          History of CVA (cerebrovascular accident)    -she had a hx of CVA with L sided weakness on 2013.  -no residual deficits since.  -she reports hx of TIA and R sided weakness on May 2018.  -she is compliant with anticoagulation meds at home.        HLD (hyperlipidemia)    -resume rosuvastatin tablet 40 mg.            Recommendations:     Post-discharge complication risks: None. Patient is stable and can resume her regular daily activities. F/u with HA clinic for evaluation and assessment, control BP. Patient advised to come to the ED if she develops any symptoms of stroke or seizures.    Stroke Education given to: patient    Follow-up in Stroke Clinic in: no need.    Discharge Plan:  Antithrombotics: Clopidogrel 75mg  Statin: Rosuvastatin 40 mg  Anticoagulant: Rivaroxaban  Aggresive risk factor modification:  Hypertension  High Cholesterol  Obesity  Atrial Fibrillation    Follow Up:  Follow-up Information     Schedule an appointment as soon as possible for a visit with David Cortez MD.    Specialty:  Neurology  Why:  Headache evaluation  Contact information:  [address] [place] [id number]  [phone]                   Patient Instructions:   No discharge procedures on file.    Medications:  Reconciled Home Medications:      Medication List      CHANGE how you take these medications    magnesium 200 mg Tab  Take 200 mg by mouth once daily.  What changed:  when to take this        CONTINUE taking these medications    AIMOVIG AUTOINJECTOR 70 mg/mL Atin  Generic drug:  erenumab-aooe  Inject 70 mg into the skin every 30 days.     ARIPiprazole 5 MG Tab  Commonly known as:  ABILIFY  Take 1 tablet (5 mg total) by mouth every  evening.     baclofen 20 MG tablet  Commonly known as:  LIORESAL  Take 1 tablet (20 mg total) by mouth 3 (three) times daily. for 10 days     carvedilol 25 MG tablet  Commonly known as:  COREG  TAKE 1 TABLET(25 MG) BY MOUTH TWICE DAILY WITH MEALS     cetirizine 10 MG tablet  Commonly known as:  ZYRTEC  Take 1 tablet (10 mg total) by mouth once daily.     clonazePAM 1 MG tablet  Commonly known as:  KLONOPIN  Take 1 tablet (1 mg total) by mouth daily as needed for Anxiety.     clopidogrel 75 mg tablet  Commonly known as:  PLAVIX  Take 1 tablet (75 mg total) by mouth once daily.     donepezil 10 MG tablet  Commonly known as:  ARICEPT  Take 1 tablet (10 mg total) by mouth 2 (two) times daily.     DULoxetine 60 MG capsule  Commonly known as:  CYMBALTA  Take 1 capsule (60 mg total) by mouth 2 (two) times daily.     gabapentin 300 MG capsule  Commonly known as:  NEURONTIN  Take 3 capsules (900 mg total) by mouth 3 (three) times daily.     hydrOXYzine 50 MG tablet  Commonly known as:  ATARAX  Take 1 tablet (50 mg total) by mouth nightly as needed.     losartan 50 MG tablet  Commonly known as:  COZAAR  Take 1 tablet (50 mg total) by mouth once daily.     meloxicam 15 MG tablet  Commonly known as:  MOBIC  Take 1 tablet (15 mg total) by mouth once daily.     ondansetron 4 MG Tbdl  Commonly known as:  ZOFRAN-ODT  Take 1 tablet (4 mg total) by mouth every 12 (twelve) hours as needed (nausea).     pantoprazole 40 MG tablet  Commonly known as:  PROTONIX  Take 1 tablet (40 mg total) by mouth once daily.     rivaroxaban 20 mg Tab  Commonly known as:  XARELTO  Take 1 tablet (20 mg total) by mouth daily with dinner or evening meal.     rosuvastatin 40 MG Tab  Commonly known as:  CRESTOR  Take 1 tablet (40 mg total) by mouth every evening.     tiaGABine 4 MG tablet  Commonly known as:  GABITRIL  Take 1 tablet (4 mg total) by mouth every evening.     triamcinolone acetonide 0.1% 0.1 % cream  Commonly known as:  KENALOG  AAA bid to rash  up to 2 weeks     zolpidem 12.5 MG CR tablet  Commonly known as:  AMBIEN CR  TAKE 1 TABLET BY MOUTH NIGHTLY AS NEEDED FOR INSOMNIA        STOP taking these medications    diphth,pertus(acell),tetanus 2.5-8-5 Lf-mcg-Lf/0.5mL Susp  Commonly known as:  BOOSTRIX     LORazepam 1 MG tablet  Commonly known as:  ATIVAN     topiramate 100 MG tablet  Commonly known as:  TOPAMAX            Bry Taylor MD  Comprehensive Stroke Center  Department of Vascular Neurology   [facility]

## 2018-09-30 NOTE — NURSING
Patient alarming asystole on telemetry. Checked on patient, patient reports sharp pain in left chest. MD notified. States he will go to the bedside. Will continue to monitor.

## 2018-09-30 NOTE — ASSESSMENT & PLAN NOTE
-Patient c/o HA that began at ~0230 and progressed to extremity weakness prompting her ED visit.  Took her migraine medication at the time w/o relief.  Remains w/a HA unrelieved by prn meds.  -Review of the patient's medical record reveals she saw Dr. Cortez in the [facility] on 8/02/18.  At that time HA control recommendations were made, see note.  -Solumedrol, Magnesium, Fioricet, and Zanaflex given x1 in ED  -Mg infusion w/ 500 cc bolus, and toradol was given.

## 2018-09-30 NOTE — PLAN OF CARE
Problem: Patient Care Overview  Goal: Plan of Care Review  Outcome: Ongoing (interventions implemented as appropriate)  POC reviewed with pt and family with all questions answered. VSS and migraine pain managed with PRN morphine. SBP maintained <220 and DBP <110 per order. Pt continues on Plavix and Xarelto PO for stroke prevention. Neuro checks completed q4 with no deterioration noted. Fall bundle implemented and pt has remained free of falls and injuries. Pt in no apparent sign of distress, will continue to monitor.

## 2018-09-30 NOTE — SUBJECTIVE & OBJECTIVE
Past Medical History:   Diagnosis Date    Anticoagulant long-term use     Anxiety     Asthma     Atrial fibrillation     Brain anoxic injury     Defibrillator activation 2013    Depression     Heart block     History of sudden cardiac arrest 2/2013    PEA arrest with subsequent long-QT    Hx of psychiatric care     Hypertension     Left atrial enlargement 4/11/2018    Pacemaker 2013    Psychiatric problem     Seizures     Sleep difficulties     Stroke     weakness lt side    Therapy     Thyroid disease      Past Surgical History:   Procedure Laterality Date    APPENDECTOMY      breast reduction      BREAST SURGERY      CARDIAC DEFIBRILLATOR PLACEMENT      CARDIAC DEFIBRILLATOR PLACEMENT      CARPAL TUNNEL RELEASE Right     colon  N/A 6/24/2014    Performed by [facility]o [facility]ustamante [facility] at [facility] (2ND FLR)    COSMETIC SURGERY      egd N/A 6/24/2014    Performed by Chemo [facility]ante [facility] at [facility] (2ND FLR)    HERNIA REPAIR      HIATAL HERNIA REPAIR      INSERT / REPLACE / REMOVE PACEMAKER  10/2017    CRT-D upgrade    INSERTION, CARDIAC PACEMAKER, DUAL CHAMBER N/A 2/15/2013    Performed by Humberto Martins MD at [facility]    INSERTION-ICD-BIVENTRICULAR N/A 10/13/2017    Performed by Avelino Mejia MD at [facility]    RELEASE-CARPAL TUNNEL Left 2/3/2017    [facility] at [facility]    RELEASE-CARPAL TUNNEL Right 12/9/2016    [facility]ugh [facility] at [facility]    TUBAL LIGATION      VENOGRAM N/A 10/13/2017    Performed by [facility]ino Mejia MD at [facility]     Family History   Problem Relation Age of Onset    Hypertension Mother     COPD Unknown     Heart failure Unknown     Glaucoma Maternal Grandmother     Glaucoma Paternal Grandmother      Social History     Tobacco Use    Smoking status: Never Smoker    Smokeless tobacco: Never Used   Substance Use Topics    Alcohol use: No    Drug use: No     Review of patient's allergies indicates:    Allergen Reactions    Aspirin Hives    Imitrex [sumatriptan] Palpitations    Penicillins Hives and Swelling    Shellfish containing products Anaphylaxis     seafood    Percocet [oxycodone-acetaminophen] Itching     States can take    Reglan [metoclopramide hcl] Other (See Comments)     Parkinsonism        Medications: I have reviewed the current medication administration record.    Facility-Administered Medications Prior to Admission   Medication Dose Route Frequency Provider Last Rate Last Dose    onabotulinumtoxina injection 200 Units  200 Units Intramuscular Q90 Days David Cortez MD   200 Units at 08/02/18 1112    [DISCONTINUED] ketorolac injection 60 mg  60 mg Intramuscular 1 time in Clinic/HOD David Cortez MD        [DISCONTINUED] onabotulinumtoxina injection 200 Units  200 Units Intramuscular Q90 Days David Cortez MD   200 Units at 05/16/18 1107     Medications Prior to Admission   Medication Sig Dispense Refill Last Dose    ARIPiprazole (ABILIFY) 5 MG Tab Take 1 tablet (5 mg total) by mouth every evening. 30 tablet 5 9/27/2018 at 0800    baclofen (LIORESAL) 20 MG tablet Take 1 tablet (20 mg total) by mouth 3 (three) times daily. for 10 days 30 tablet 0 Past Week at Unknown time    carvedilol (COREG) 25 MG tablet TAKE 1 TABLET(25 MG) BY MOUTH TWICE DAILY WITH MEALS 180 tablet 3 9/27/2018 at 0800    DULoxetine (CYMBALTA) 60 MG capsule Take 1 capsule (60 mg total) by mouth 2 (two) times daily. 60 capsule 5 9/28/2018 at 0800    erenumab-aooe (AIMOVIG AUTOINJECTOR) 70 mg/mL AtIn Inject 70 mg into the skin every 30 days. 1 mL 6 Past Month at Unknown time    gabapentin (NEURONTIN) 300 MG capsule Take 3 capsules (900 mg total) by mouth 3 (three) times daily. 810 capsule 12 9/27/2018 at 2300    hydrOXYzine (ATARAX) 50 MG tablet Take 1 tablet (50 mg total) by mouth nightly as needed. 30 tablet 5 Past Week at Unknown time    losartan (COZAAR) 50 MG tablet Take 1 tablet (50 mg total) by mouth  once daily. 90 tablet 3 9/28/2018 at 1700    magnesium 200 mg Tab Take 200 mg by mouth once daily. (Patient taking differently: Take 200 mg by mouth every other day. ) 30 each 12 9/28/2018 at 2100    meloxicam (MOBIC) 15 MG tablet Take 1 tablet (15 mg total) by mouth once daily. 30 tablet 3 Past Week at Unknown time    ondansetron (ZOFRAN-ODT) 4 MG TbDL Take 1 tablet (4 mg total) by mouth every 12 (twelve) hours as needed (nausea). 10 tablet 1 9/27/2018 at 2000    pantoprazole (PROTONIX) 40 MG tablet Take 1 tablet (40 mg total) by mouth once daily. 30 tablet 11 9/28/2018 at 1700    rivaroxaban (XARELTO) 20 mg Tab Take 1 tablet (20 mg total) by mouth daily with dinner or evening meal. 30 tablet 11 9/28/2018 at 1700    rosuvastatin (CRESTOR) 40 MG Tab Take 1 tablet (40 mg total) by mouth every evening. 90 tablet 3 9/28/2018 at 1700    tiaGABine (GABITRIL) 4 MG tablet Take 1 tablet (4 mg total) by mouth every evening. 30 tablet 12 9/27/2018 at 1900    zolpidem (AMBIEN CR) 12.5 MG CR tablet TAKE 1 TABLET BY MOUTH NIGHTLY AS NEEDED FOR INSOMNIA 30 tablet 0 Past Week at Unknown time    cetirizine (ZYRTEC) 10 MG tablet Take 1 tablet (10 mg total) by mouth once daily. 90 tablet 11 Unknown at Unknown time    clonazePAM (KLONOPIN) 1 MG tablet Take 1 tablet (1 mg total) by mouth daily as needed for Anxiety. 30 tablet 5 Unknown at Unknown time    clopidogrel (PLAVIX) 75 mg tablet Take 1 tablet (75 mg total) by mouth once daily. 90 tablet 2 Unknown at Unknown time    donepezil (ARICEPT) 10 MG tablet Take 1 tablet (10 mg total) by mouth 2 (two) times daily. 180 tablet 3 Unknown at Unknown time    triamcinolone acetonide 0.1% (KENALOG) 0.1 % cream AAA bid to rash up to 2 weeks 60 g 1 Unknown at Unknown time    [DISCONTINUED] diphth,pertus,acell,,tetanus (BOOSTRIX) 2.5-8-5 Lf-mcg-Lf/0.5mL Susp Inject 0.5 ml into the muscle once. 0.5 mL 0 Taking    [DISCONTINUED] LORazepam (ATIVAN) 1 MG tablet Take 1 tablet (1 mg  total) by mouth as needed for Anxiety. 1 tablet 0 Taking    [DISCONTINUED] topiramate (TOPAMAX) 100 MG tablet Take 2 tablets (200 mg total) by mouth 2 (two) times daily. 120 tablet 12 Taking       Review of Systems   Constitutional: Negative for appetite change, chills and unexpected weight change.   HENT: Negative for congestion, hearing loss, nosebleeds and rhinorrhea.    Eyes: Negative for photophobia, discharge and visual disturbance.   Respiratory: Negative for cough and shortness of breath.    Cardiovascular: Negative for chest pain, palpitations and leg swelling.   Gastrointestinal: Negative for abdominal pain, nausea and vomiting.   Endocrine: Negative for polydipsia and polyphagia.   Genitourinary: Negative for dysuria.   Musculoskeletal: Negative for arthralgias and neck stiffness.   Skin: Negative for pallor.   Allergic/Immunologic: Negative for immunocompromised state.   Neurological: Negative for weakness.        LUE and LLE weakness and L fascial heaviness (improved)   Psychiatric/Behavioral: Negative for behavioral problems and confusion.     Objective:     Vital Signs (Most Recent):  Temp: 97.8 °F (36.6 °C) (09/30/18 1227)  Pulse: 70 (09/30/18 1227)  Resp: 18 (09/30/18 1227)  BP: (!) 177/93 (09/30/18 1227)  SpO2: 98 % (09/30/18 1227)    Vital Signs Range (Last 24H):  Temp:  [97.7 °F (36.5 °C)-98.1 °F (36.7 °C)]   Pulse:  []   Resp:  [16-20]   BP: (134-181)/()   SpO2:  [95 %-98 %]     Physical Exam   Constitutional: She is oriented to person, place, and time. She appears well-developed and well-nourished. No distress.   HENT:   Head: Normocephalic and atraumatic.   Eyes: EOM are normal. Pupils are equal, round, and reactive to light. Right eye exhibits no discharge. Left eye exhibits no discharge. No scleral icterus.   Neck: Normal range of motion.   Cardiovascular: Normal rate and regular rhythm.   Pulmonary/Chest: Effort normal and breath sounds normal. No respiratory distress.    Abdominal: Soft. Bowel sounds are normal. There is no tenderness.   Musculoskeletal: Normal range of motion. She exhibits no deformity.   Neurological: She is alert and oriented to person, place, and time. No cranial nerve deficit.   LUE and LLE weakness and decreased in sensation when compare it to the R side (improved)   Skin: Skin is warm and dry. Capillary refill takes less than 2 seconds. She is not diaphoretic.   Psychiatric: She has a normal mood and affect.       Neurological Exam:   LOC: alert and oriented  Attention Span: Good   Language: No aphasia  Articulation: No dysarthria  Orientation: Person, Place, Time   Visual Fields: Full  EOM (CN III, IV, VI): Full/intact  Pupils (CN II, III): PERRL  Facial Sensation (CN V): Normal bilaterally  Facial Movement (CN VII): Normal bilaterally  Motor:   Arm left 5/5  Leg left  5/5  Arm right  Normal 5/5  Leg right Normal 5/5  Sensation: Intact to light touch, temperature and vibration  Tone: Normal tone throughout          Laboratory:  Reviwed    Diagnostic Results:      Brain imaging:  CTH on 9/28 showed No evidence of acute hemorrhage or major vascular distribution infarct. Small remote right cerebellar infarct.    Vessel Imaging:  None    Cardiac Evaluation:   None

## 2018-10-02 ENCOUNTER — OFFICE VISIT (OUTPATIENT)
Dept: INTERNAL MEDICINE | Facility: CLINIC | Age: 53
End: 2018-10-02
Payer: MEDICARE

## 2018-10-02 ENCOUNTER — NURSE TRIAGE (OUTPATIENT)
Dept: ADMINISTRATIVE | Facility: CLINIC | Age: 53
End: 2018-10-02

## 2018-10-02 ENCOUNTER — CLINICAL SUPPORT (OUTPATIENT)
Dept: INTERNAL MEDICINE | Facility: CLINIC | Age: 53
End: 2018-10-02
Payer: MEDICARE

## 2018-10-02 ENCOUNTER — PATIENT OUTREACH (OUTPATIENT)
Dept: ADMINISTRATIVE | Facility: CLINIC | Age: 53
End: 2018-10-02

## 2018-10-02 ENCOUNTER — TELEPHONE (OUTPATIENT)
Dept: INTERNAL MEDICINE | Facility: CLINIC | Age: 53
End: 2018-10-02

## 2018-10-02 VITALS
BODY MASS INDEX: 49.35 KG/M2 | HEART RATE: 77 BPM | WEIGHT: 287.5 LBS | SYSTOLIC BLOOD PRESSURE: 138 MMHG | OXYGEN SATURATION: 98 % | DIASTOLIC BLOOD PRESSURE: 78 MMHG | TEMPERATURE: 98 F

## 2018-10-02 DIAGNOSIS — K59.00 CONSTIPATION, UNSPECIFIED CONSTIPATION TYPE: Primary | ICD-10-CM

## 2018-10-02 DIAGNOSIS — Z23 NEED FOR TETANUS BOOSTER: ICD-10-CM

## 2018-10-02 PROBLEM — M25.511 BILATERAL SHOULDER PAIN: Status: RESOLVED | Noted: 2017-04-12 | Resolved: 2018-10-02

## 2018-10-02 PROBLEM — G47.8 UPPER AIRWAY RESISTANCE SYNDROME: Status: RESOLVED | Noted: 2017-02-21 | Resolved: 2018-10-02

## 2018-10-02 PROBLEM — M25.512 BILATERAL SHOULDER PAIN: Status: RESOLVED | Noted: 2017-04-12 | Resolved: 2018-10-02

## 2018-10-02 PROBLEM — M25.612 DECREASED ROM OF LEFT SHOULDER: Status: RESOLVED | Noted: 2017-04-12 | Resolved: 2018-10-02

## 2018-10-02 PROBLEM — E87.1 HYPONATREMIA: Status: RESOLVED | Noted: 2018-04-12 | Resolved: 2018-10-02

## 2018-10-02 PROBLEM — R06.09 DYSPNEA ON EXERTION: Status: RESOLVED | Noted: 2017-09-12 | Resolved: 2018-10-02

## 2018-10-02 PROCEDURE — 99213 OFFICE O/P EST LOW 20 MIN: CPT | Mod: S$PBB,,, | Performed by: NURSE PRACTITIONER

## 2018-10-02 PROCEDURE — 99999 PR PBB SHADOW E&M-EST. PATIENT-LVL V: CPT | Mod: PBBFAC,,, | Performed by: NURSE PRACTITIONER

## 2018-10-02 PROCEDURE — 99215 OFFICE O/P EST HI 40 MIN: CPT | Mod: PBBFAC | Performed by: NURSE PRACTITIONER

## 2018-10-02 PROCEDURE — 90714 TD VACC NO PRESV 7 YRS+ IM: CPT | Mod: PBBFAC

## 2018-10-02 NOTE — PHYSICIAN QUERY
"PT Name: Amna Chawla  MR #: [id number]    Physician Query Form - Heart  Condition Clarification     CDS/: Alicia Stiles RN, CDS               Contact information: [email or web address]  This form is a permanent document in the medical record.     Query Date: October 2, 2018    By submitting this query, we are merely seeking further clarification of documentation. Please utilize your independent clinical judgment when addressing the question(s) below.    The medical record contains the following   Indicators     Supporting Clinical Findings Location in Medical Record    BNP     x EF EF: 55%   Echo 9/28   x Radiology findings --Cardiomediastinal silhouette is stable without evidence of failure.    --The lungs are well expanded without focal consolidation, pleural effusion or pneumothorax   CXR 9/28   x Echo Results CONCLUSIONS     1 - Normal left ventricular systolic function (EF 55-60%).     2 - Biatrial enlargement.     3 - No wall motion abnormalities.     4 - Indeterminate LV diastolic function.     5 - Normal right ventricular systolic function .     6 - The estimated PA systolic pressure is 25 mmHg.     7 - Mild tricuspid regurgitation.    Echo 9/28    "Ascites" documented      "SOB" or "MERCHANT" documented      "Hypoxia" documented     x Heart Failure documented --52 yo woman with Afib, HFrEF on CRT-D (ischemic vs nonischemic?), Vfib arrest 2013   Vas neuro Note 9/29 and DS    "Edema" documented     x Diuretics/Meds Coreg 25mg PO BID  Cozaar 50mg PO QD   Home Meds per Vas Neuro Note 9/29    Treatment:     x Other:  --Paroxysmal atrial fibrillation   --Essential hypertension   --CRT-D device   Vas neuro Note 9/29   Heart failure (HF) can be acute, chronic or both. It is generally further specificed as systolic, diastolic, or combined. Lastly, it is important to identify an underlying etiology if known or suspected.   Common clues to acute exacerbation:  Rapidly progressive symptoms (w/in 2 weeks " "of presentation), using IV diuretics to treat, using supplemental O2, pulmonary edema on Xray, MI w/in 4 weeks, and/or BNP >500  Systolic Heart Failure: is defined as chart documentation of a left ventricular ejection fraction (LVEF) less than 40%   Diastolic Heart Failure: is defined as a left ventricular ejection fraction (LVEF) greater than 40%   +      Evidence of diastolic dysfunction on echocardiography OR    Right heart catheterization wedge pressure above 12 mm Hg OR    Left heart catheterization left ventricular end diastolic pressure 18 mm Hg or above.    References: *American Heart Association    The clinical guidelines noted below are only system guidelines, and do not replace the providers clinical judgment.      Please clarify the "HFrEF" diagnosis:     Provider, please specify the diagnosis associated with above clinical findings    [   ] Chronic Systolic Heart Failure - Pre-existing systolic HF diagnosis.  EF < 40%  without  acute HF symptoms documented    [   ] Other Type of Heart Failure (please specify type): _________________________    [   ] Heart Failure Ruled Out for this admission; Past Medical History only    [   ] Other (please specify): ___________________________________  [  xxx ] Clinically Undetermined                          Please document in your progress notes daily for the duration of treatment until resolved and include in your discharge summary.  "

## 2018-10-02 NOTE — PATIENT INSTRUCTIONS
Discharge Instructions for Stroke  You have been diagnosed with a stroke, or with a TIA (transient ischemic attack). Or you have been identified as having a high risk for stroke. During a stroke, blood stops flowing to part of your brain. This can damage areas in the brain that control other parts of the body. Symptoms after a stroke depend on which part of the brain has been affected.  Stroke risk factors  Once youve had a stroke, youre at greater risk for another one. Listed below are some other factors that can increase your risk for a stroke:  · High blood pressure  · High cholesterol  · Cigarette or cigar smoking  · Diabetes  · Carotid or other artery disease  · Atrial fibrillation, atrial flutter, or other heart disease  · Not being physically active  · Obesity  · Certain blood disorders (such as sickle cell anemia)  · Excessive alcohol use  · Abuse of street drugs  · Race  · Gender  · Family history of stroke  · Diet high in salty, fried, or greasy foods  Changes in daily living  Doing your regular tasks may be difficult after youve had a stroke, but you can learn new ways to manage your daily activities. In fact, doing daily activities may help you to regain muscle strength and bring back function to affected limbs. Be patient, give yourself time to adjust, and appreciate the progress you make.  Daily activities  You may be at risk of falling. Make changes to your home to help you walk more easily. A therapist will decide if you need an assistive device to walk safely.  You may need to see an occupational therapist or physical therapist to learn new ways of doing things. For example, you may need to make adjustments when bathing or dressing:  Tips for showering or bathing  · Test the water temperature with a hand or foot that was not affected by the stroke.  · Use grab bars, a shower seat, a hand-held showerhead, and a long-handled brush.  Tips for getting dressed  · Dress while sitting, starting with  the affected side or limb.  · Wear shirts that pull easily over your head. Wear pants or skirts with elastic waistbands.  · Use zippers with loops attached to the pull tabs.  Lifestyle changes  · Take your medicines exactly as directed. Dont skip doses.  · Begin an exercise program. Ask your provider how to get started. Also ask how much activity you should try to get on a daily or weekly basis. You can benefit from simple activities such as walking or gardening.  · Limit alcohol intake. Men should have no more than 2 alcoholic drinks a day. Women should limit themselves to 1 alcoholic drink per day.  · Know your cholesterol level. Follow your providers recommendations about how to keep cholesterol under control.  · If you are a smoker, quit now. Join a stop-smoking program to improve your chances of success. Ask your provider about medicines or other methods to help you quit.  · Learn stress management techniques to help you deal with stress in your home and work life.  Diet  Your healthcare provider will give you information on dietary changes that you may need to make, based on your situation. Your provider may recommend that you see a registered dietitian for help with diet changes. Changes may include:  · Reducing fat and cholesterol intake  · Reducing salt (sodium) intake, especially if you have high blood pressure  · Eating more fresh vegetables and fruits  · Eating more lean proteins, such as fish, poultry, and beans and peas (legumes)  · Eating less red meat and processed meats  · Using low-fat dairy products  · Limiting vegetable oils and nut oils  · Limiting sweets and processed foods such as chips, cookies, and baked goods  Follow-up care  · Keep your medical appointments. Close follow-up is important to stroke rehabilitation and recovery.  · Some medicines require blood tests to check for progress or problems. Keep follow-up appointments for any blood tests ordered by your providers.  When to call  911  Call 911 right away if you have any of the following symptoms of stroke:  · Weakness, tingling, or loss of feeling on one side of your face or body  · Sudden double vision or trouble seeing in one or both eyes  · Sudden trouble talking or slurred speech  · Trouble understanding others  · Sudden, severe headache  · Dizziness, loss of balance, or a sense of falling  · Blackouts or seizures      F.A.S.T. is an easy way to remember the signs of stroke. When you see these signs, you know that you need to call 911 fast.  F.A.S.T. stands for:  · F is for face drooping. One side of the face is drooping or numb. When the person smiles, the smile is uneven.  · A is for arm weakness. One arm is weak or numb. When the person lifts both arms at the same time, one arm may drift downward.  · S is for speech difficulty. You may notice slurred speech or trouble speaking. The person can't repeat a simple sentence correctly when asked.  · T is for time to call 911. If someone shows any of these symptoms, even if they go away, call 911 immediately. Make note of the time the symptoms first appeared.  Date Last Reviewed: 8/26/2015 © 2000-2018 [employer]. [address] [place] [address], [place] [address]. All rights reserved. This information is not intended as a substitute for professional medical care. Always follow your healthcare professional's instructions.

## 2018-10-02 NOTE — PATIENT INSTRUCTIONS
1 scoop of miralax daily and 50mg over the counter colace twice a day until bowel movements become more regular.  Then you can stop miralax and drop down to one colace for a couple days then stop.    Increase water and fiber intake in diet.

## 2018-10-02 NOTE — PHYSICIAN QUERY
PT Name: Amna Chawla  MR #: [id number]     Physician Query Form - Diagnosis Clarification      CDS/: Alicia Stiles RN, CDS               Contact information: [email or web address]    This form is a permanent document in the medical record.     Query Date: October 2, 2018    By submitting this query, we are merely seeking further clarification of documentation.  Please utilize your independent clinical judgment when addressing the question(s) below.     The medical record contains the following:      Findings Supporting Clinical Information Location in Medical Record     HTN emergency   --Other ddx considered: HTN emergency, carotid stenosis, TIA     --Essential Hypertension       -PLAN:          -tight control of BP.          -resume home dose lisinopril          -continue monitoring BP     --She has had labile BP since admission, but no other evidence of end-organ  damage.          -BP currently treated with losartan but remains labile.     BP: 200/122->172/117->195/105->206/96->196/106->177/88     Vas Neuro Note 9/29-> DS      H&P 9/28            Vas Neuro Note 9/29          VS flowsheet 9/28->9/30     Please clarify if the __HTN emergency_____ diagnosis has been:    [  ] Ruled In  [  ] Ruled In, Now Resolved  [  ] Ruled Out  [  ] Clinically insignificant  [xxx  ] Clinically undetermined  [  ] Other/Clarification of findings (please specify)_______________________________    Please document in your progress notes daily for the duration of treatment, until resolved, and include in your discharge summary.

## 2018-10-02 NOTE — PHYSICIAN QUERY
"PT Name: Amna Chawla  MR #: [id number]     Physician Query Form - Etiology of Condition Clarification      CDS/: Alicia Stiles RN, CDS               Contact information: [email or web address]  This form is a permanent document in the medical record.     Query Date: October 2, 2018    By submitting this query, we are merely seeking further clarification of documentation.  Please utilize your independent clinical judgment when addressing the question(s) below.     The medical record contains the following:    Findings Supporting Clinical Information Location in Medical Record     Left-sided weakness    --She states she woke up at 0230 this AM with a severe R sided HA  --She endorses L upper and lower extremity weakness and L facial heaviness  --NIHSS: 2  --PLAN:          -tight control of BP.    --Reports severe migraine and says primary team not giving her her typical "infusion" as prescribed by Dr. Cortez.    ---Patient c/o HA that began at ~0230 and progressed to extremity weakness prompting her ED visit  ---BP is currently labile, on losartan only. Will add coreg.        -Also having 10/10 headaches/migraines, may be contributing to lability.          -will give cocktail   --Acute ischemic stroke        -DDx: migraine, cardioembolic stroke        -Other ddx considered: HTN emergency, carotid stenosis, TIA     --Problem List        -Acute ischemic stroke              -CTH at ED showed no evidence of acute hemorrhage or major vascular distribution infarct.   --F/u with HA clinic for evaluation and assessment, control BP  --Follow-up in Stroke Clinic: no need         H&P              Neuro Note 9/29      Vas Neuro Note 9/29                    Discharge Summary     Please document your best medical opinion regarding the etiology of __Left-sided weakness ____ for which the primary focus of treatment is/was directed.         [  ] Acute ischemic stroke, unspecified    [  ] TIA, unspecified     [  ] " HTN    [  ] Intractable migraine    [  ] Other (please specify): __________________        [   xxx ]  Clinically Undetermined    Please document in your progress notes daily for the duration of treatment, until resolved, and include in your discharge summary.

## 2018-10-02 NOTE — TELEPHONE ENCOUNTER
Pt states no BM since last Tuesday. Took ex lax with only small BM today. Able to pass gas. Denies any other symptoms. Called and got appointment today with Jamil Maharaj at 1600 as pt states needs appt after 1530. Called pt back. No answer. Left message.     Reason for Disposition   Last bowel movement (BM) > 4 days ago    Protocols used: ST CONSTIPATION-A-OH

## 2018-10-02 NOTE — PROGRESS NOTES
"Subjective:       Patient ID: Amna Chawla is a 53 y.o. female.    Chief Complaint: Constipation    HPI:  54 yo female that presents to clinic today with complaint of constipation and need for tetanus booster.    States that she has been having problems with constipation since she got out of the hospital last week.  Was admitted for ED on 09/28/18 for HTN and acute ischemic stroke.  States that she was getting pain medicine in hospital which was "backing her up."  States that she took a stool softener and x-lax pill earlier today and had small BM.  States that she is passing gas.  Denies any abdominal pain    Denies any fever, SOB, chest pain, n/v or dizziness.    Patient is also requesting tetanus booster as she was told she "needed one before being discharged from hospital."    Review of Systems   Constitutional: Negative for activity change, appetite change, fatigue and fever.   Respiratory: Negative for apnea, cough, shortness of breath and wheezing.    Cardiovascular: Negative for chest pain, palpitations and leg swelling.   Gastrointestinal: Positive for constipation. Negative for abdominal distention, abdominal pain, blood in stool, diarrhea, nausea and vomiting.   Musculoskeletal: Negative for arthralgias, myalgias, neck pain and neck stiffness.   Skin: Negative for color change and rash.   Neurological: Negative for dizziness, light-headedness, numbness and headaches.   Psychiatric/Behavioral: Negative for behavioral problems.       Objective:      Physical Exam   Constitutional: She is oriented to person, place, and time. She appears well-developed and well-nourished. No distress.   Cardiovascular: Normal rate, regular rhythm, normal heart sounds and intact distal pulses.   No murmur heard.  Pulmonary/Chest: Effort normal and breath sounds normal. No stridor. No respiratory distress. She has no wheezes.   Abdominal: Soft. Bowel sounds are normal. She exhibits no distension and no mass. There is no " tenderness. There is no rebound.   Neurological: She is alert and oriented to person, place, and time.   Skin: Skin is warm and dry. No erythema.   Psychiatric: Her behavior is normal.       Assessment:       1. Constipation, unspecified constipation type    2. Need for tetanus booster        Plan:     1.  Constipation  -Bowel sounds are normal with no abdominal tenderness.  -Small BM this morning and continues to pass gas.  -1 scoop of miralax daily and 50mg over the counter colace twice a day until bowel movements become more regular.  Then can stop miralax and drop down to one colace for a couple days then stop.  -Encouraged to increase fiber and water in diet.    2.  Need for tetanus booster  -No record of tetanus shot on file and states that it has been over 10 years since she had a shot.  -Tetanus shot given in clinic.

## 2018-10-02 NOTE — PHYSICIAN QUERY
PT Name: Amna Chawla  MR #: [id number]     Physician Query Form - Documentation Clarification      CDS/: Alicia Stiles RN, CDS               Contact information: [email or web address]    This form is a permanent document in the medical record.     Query Date: October 2, 2018    By submitting this query, we are merely seeking further clarification of documentation. Please utilize your independent clinical judgment when addressing the question(s) below.    The Medical record reflects the following:    Supporting Clinical Findings Location in Medical Record     --Aggresive risk factor modification:   Hypertension   High Cholesterol   Obesity   Atrial Fibrillation     --BMI (Calculated): 48.3    Discharge Summary            RD c/s 9/29                                                                                  Doctor, Please specify diagnosis or diagnoses associated with above clinical findings.    Provider Use Only      [  xxx] Morbid Obesity    [  ] Other: _____________                                                                                                             [  ] Clinically undetermined

## 2018-10-03 ENCOUNTER — HOSPITAL ENCOUNTER (OUTPATIENT)
Dept: RADIOLOGY | Facility: HOSPITAL | Age: 53
Discharge: HOME | End: 2018-10-03
Payer: MEDICARE

## 2018-10-03 ENCOUNTER — OFFICE VISIT (OUTPATIENT)
Dept: ELECTROPHYSIOLOGY | Facility: CLINIC | Age: 53
End: 2018-10-03
Payer: MEDICARE

## 2018-10-03 ENCOUNTER — HOSPITAL ENCOUNTER (OUTPATIENT)
Dept: CARDIOLOGY | Facility: CLINIC | Age: 53
Discharge: HOME | End: 2018-10-03
Payer: MEDICARE

## 2018-10-03 VITALS
DIASTOLIC BLOOD PRESSURE: 78 MMHG | BODY MASS INDEX: 48.93 KG/M2 | WEIGHT: 286.63 LBS | SYSTOLIC BLOOD PRESSURE: 118 MMHG | HEART RATE: 89 BPM | HEIGHT: 64 IN

## 2018-10-03 DIAGNOSIS — I48.0 PAROXYSMAL ATRIAL FIBRILLATION: ICD-10-CM

## 2018-10-03 DIAGNOSIS — G43.711 CHRONIC MIGRAINE WITHOUT AURA, WITH INTRACTABLE MIGRAINE, SO STATED, WITH STATUS MIGRAINOSUS: Primary | ICD-10-CM

## 2018-10-03 DIAGNOSIS — E66.01 OBESITY, CLASS III, BMI 40-49.9 (MORBID OBESITY): ICD-10-CM

## 2018-10-03 DIAGNOSIS — I44.2 COMPLETE AV BLOCK: ICD-10-CM

## 2018-10-03 DIAGNOSIS — Z95.810 BIVENTRICULAR AUTOMATIC IMPLANTABLE CARDIOVERTER DEFIBRILLATOR IN SITU: ICD-10-CM

## 2018-10-03 DIAGNOSIS — Z12.39 SCREENING FOR BREAST CANCER: ICD-10-CM

## 2018-10-03 DIAGNOSIS — Z86.74 HISTORY OF SUDDEN CARDIAC ARREST: ICD-10-CM

## 2018-10-03 DIAGNOSIS — Z79.01 LONG TERM (CURRENT) USE OF ANTICOAGULANTS: ICD-10-CM

## 2018-10-03 DIAGNOSIS — I42.8 NONISCHEMIC CARDIOMYOPATHY: ICD-10-CM

## 2018-10-03 DIAGNOSIS — G45.9 TIA (TRANSIENT ISCHEMIC ATTACK): Primary | ICD-10-CM

## 2018-10-03 PROCEDURE — 77067 SCR MAMMO BI INCL CAD: CPT | Mod: TC

## 2018-10-03 PROCEDURE — 99213 OFFICE O/P EST LOW 20 MIN: CPT | Mod: S$PBB,,, | Performed by: INTERNAL MEDICINE

## 2018-10-03 PROCEDURE — 99999 PR PBB SHADOW E&M-EST. PATIENT-LVL IV: CPT | Mod: PBBFAC,,, | Performed by: INTERNAL MEDICINE

## 2018-10-03 PROCEDURE — 99214 OFFICE O/P EST MOD 30 MIN: CPT | Mod: PBBFAC,25 | Performed by: INTERNAL MEDICINE

## 2018-10-03 PROCEDURE — 77063 BREAST TOMOSYNTHESIS BI: CPT | Mod: TC

## 2018-10-03 PROCEDURE — 93005 ELECTROCARDIOGRAM TRACING: CPT | Mod: PBBFAC | Performed by: INTERNAL MEDICINE

## 2018-10-03 PROCEDURE — 77067 SCR MAMMO BI INCL CAD: CPT | Mod: 26,,, | Performed by: RADIOLOGY

## 2018-10-03 PROCEDURE — 93010 ELECTROCARDIOGRAM REPORT: CPT | Mod: S$PBB,,, | Performed by: INTERNAL MEDICINE

## 2018-10-03 PROCEDURE — 77063 BREAST TOMOSYNTHESIS BI: CPT | Mod: 26,,, | Performed by: RADIOLOGY

## 2018-10-03 NOTE — PROGRESS NOTES
Subjective:    Patient ID:  Amna Chawla is a 53 y.o. female who presents for follow-up of nonischemic cardiomyopathy      HPI  Prior Hx:  I had the pleasure of seeing Amna Chawla in follow-up today for her history of cardiac arrest, heart block, and ICD implantation. As you are aware, she is a 52-year old female, former patient of Dr. Humberto Martins and patient of mine I cared for when she initially presented in cardiac arrest as well as followed in my general cardiology fellow clinic, who was admitted to [facility] in 2/2013 after having a cardiac arrest.  She was working as a school  and collapsed at work.  On EMS arrival it was described that she was pulseless and given epinephrine (? Initially PEA) however after epinephrine noted to be in VF and was resuscitated with defibrillation/ACLS.  She underwent hypothermia protocol. Her post-arrest course was notable or intermittent 3rd-degree AV block. On 2/15/2013, a dual chamber ICD was implanted. Her EF prior to discharge was 60%. She had a negative coronary CTA during that admission.  In subsequent follow-up she underwent a pharmacologic stress ECHO in 2014 which showed a preserved LVEF and no ischemia.     Subsequent ICD interrogations show no VT, 100% RV pacing.  She was also diagnosed with symptomatic paroxysmal AF and was started on anticoagulation. She preferred coumadin.  She noted new onset dyspnea on exertion 9/2017. We performed an echocardiogram and her EF was 40-45% in setting of chronic RV pacing. Recent PET stress showed no ischemia/infarct. We proceeded with upgrade to CRT-D which was performed on 9/27/2017.     At Ms. Chawla's 3 month post CRT-D upgrade visit she noted more energy and improvement in the shortness of breath. She noted very rare diaphragm stimulation.    Ms. Chawla presents for 6 month post-CRT upgrade follow-up in 4/2018. We planned on getting an ECHO at the 6 month chu however it was done early at the 4 month chu. Her  EF improved to 45-50% on that study (see below). Her main issues are complex headaches for which she is seeing neurology.     Interim Hx:  Ms. Chawla presents for 6 month follow-up. She was recently hospitalized for left sided weakness in setting of severe hypertension. CT head was negative for any acute ischemia/bleed. She briefly held xarelto in August for severe epistaxis and then resumed. Device interrogation notes no recent AF. BiV paces nearly 100%. Stable lead parameters. Repeat ECHO recently noted EF normalized at 55-60%. Today has an underlying rhythm.       I reviewed today's ECG tracing, which shows sinus rhythm with BiV paced complexes with QRS duration of 122msec.    Review of Systems   Constitution: Negative for fever, weakness and malaise/fatigue.   HENT: Negative for congestion and sore throat.    Eyes: Negative for blurred vision and visual disturbance.   Cardiovascular: Negative for chest pain, dyspnea on exertion, near-syncope, orthopnea, palpitations, paroxysmal nocturnal dyspnea and syncope.   Respiratory: Negative for cough and shortness of breath.    Hematologic/Lymphatic: Negative for bleeding problem. Does not bruise/bleed easily.   Skin: Negative.    Musculoskeletal: Negative.    Gastrointestinal: Negative for hematochezia and melena.   Neurological: Positive for headaches. Negative for focal weakness.        Objective:    Physical Exam   Constitutional: She is oriented to person, place, and time. She appears well-developed and well-nourished. No distress.   HENT:   Head: Normocephalic and atraumatic.   Eyes: Conjunctivae are normal. Right eye exhibits no discharge. Left eye exhibits no discharge.   Neck: Neck supple. No JVD present.   Cardiovascular: Normal rate, regular rhythm and normal heart sounds. Exam reveals no gallop and no friction rub.   No murmur heard.  Pulmonary/Chest: Effort normal and breath sounds normal. No respiratory distress. She has no wheezes. She has no rales.   ICD  site well healed   Abdominal: Soft. Bowel sounds are normal. She exhibits no distension. There is no tenderness. There is no rebound.   Musculoskeletal: She exhibits no edema.   Neurological: She is alert and oriented to person, place, and time.   Skin: Skin is warm and dry. She is not diaphoretic.   Psychiatric: She has a normal mood and affect. Her behavior is normal. Judgment and thought content normal.   Vitals reviewed.        Assessment:       1. TIA (transient ischemic attack)    2. History of sudden cardiac arrest    3. Complete AV block    4. Biventricular automatic implantable cardioverter defibrillator in situ    5. Nonischemic cardiomyopathy from RV pacing, resolved with upgrade cardiac resynchronization therapy    6. Paroxysmal atrial fibrillation    7. Obesity, Class III, BMI 40-49.9 (morbid obesity)    8. Long term (current) use of anticoagulants         Plan:       In summary, Mrs. Chawla is a pleasant 52 yo cardiac arrest survivor with VF noted during arrest, no ischemic heart disease with preserved LVEF, intermittent 3rd degree AV block, and symptomatic LVEF of 40% in setting of normal PET stress and chronic RV pacing who presents for CRT-D follow-up. Doing well. EF has normalized. Continue remote checks every 3 months. BP well controlled today. Follow-up in 6 months.     Thank you for allowing me to participate in the care of this patient. Please do not hesitate to call me with any questions or concerns.     Avelino Mejia MD, PhD  Cardiac Electrophysiology

## 2018-10-04 DIAGNOSIS — I44.2 COMPLETE AV BLOCK: Primary | ICD-10-CM

## 2018-10-04 NOTE — PT/OT/SLP DISCHARGE
Physical Therapy Discharge Summary    Name: Amna Chawla  MRN: [id number]   Principal Problem: Acute ischemic stroke     Patient Discharged from acute Physical Therapy on 9/30/2018.  Please refer to prior PT noted date on 9/29/2018 for functional status.     Assessment:     Patient appropriate for care in another setting.    Objective:     GOALS:   Multidisciplinary Problems     Physical Therapy Goals        Problem: Physical Therapy Goal    Goal Priority Disciplines Outcome Goal Variances Interventions   Physical Therapy Goal     PT, PT/OT Ongoing (interventions implemented as appropriate)     Description:    Goals to be met by 10/9    1. Pt will perform sit to stand transfers with modified independence using RW  2. Pt will perform gait x 200 feet with modified independence using RW.  3. Pt will stand  x 5 minutes with SBA and no LOB while performing dynamic UE tasks to prepare for functional tasks in standing.                       Reasons for Discontinuation of Therapy Services  Transfer to alternate level of care.      Plan:     Patient Discharged to: Home with Home Health Service.    Elvira Trejo, PT  10/4/2018

## 2018-10-05 ENCOUNTER — TELEPHONE (OUTPATIENT)
Dept: PHARMACY | Facility: CLINIC | Age: 53
End: 2018-10-05

## 2018-10-08 NOTE — PROGRESS NOTES
"[facility]  New Visit Progress Note   Recent Hospital Discharge     PRESENTING HISTORY     Chief Complaint/Reason for Visit:  Follow up Hospital Discharge   No chief complaint on file.    PCP: Sam Allred MD    History of Present Illness: Ms. Amna Chawla is a 53 y.o. female who was recently admitted to the hospital.    [facility]  Vascular Neurology  Comprehensive Stroke Center  Discharge Summary      Summary:      Admit Date: 9/28/2018  8:08 AM     Discharge Date and Time: No discharge date for patient encounter.     Attending Physician: Richmond Ferrell MD      Discharge Provider: Bry Taylor MD     History of Present Illness: Mrs. Chawla is 53 y.o. female with co-morbidities including: HTN (on lisinopril and losartan), HLD (on rosuvastatin), CAD, CVA, seizures, a fib (on Xarelto and Plavix), pacemaker in place presented to the ED with L upper and lower extremity weakness and facial heavihness. She states she woke up at 0230 this AM with a severe R sided HA. She took her headache medication and went back to sleep. She did not get out of the bed till 0630, she reports noticing her symptoms because she was "stumbling". She endorses L upper and lower extremity weakness and L facial heaviness. She reports having a TIA in May 2018, but had her symptoms on the R. She had hx of cardiac arrest and CVA on 2013 with L sided weakness but no residual deficits. CTH at ED showed no evidence of acute hemorrhage or major vascular distribution infarct, Small remote right cerebellar infarct was noticed. She reports that she is complaint with her medications and never missed a dose. Patient admitted to stroke service for physical therapy evaluation and close observation.           Hospital Course (synopsis of major diagnoses, care, treatment, and services provided during the course of the hospital stay): Ms Chawla is a 52 yo woman with Afib (briefly off Xarelto for nose bleed), on plavix for " stroke prevention, HFrEF w/ CRT-D (non-ischemic?), HTN, here for left hemiparesis and headache. On arrival to ED found to have HTN to 200/122. CTH showed small remote cerebellar infarct, but no acute ischemia or bleed. MRI was unable to be performed due to CRT-D device. She was treated medically with Plavix 75mg, Xarelto, Rosuvastatin 40mg. She has had labile BP since admission, but no other evidence of end-organ damage. BP currently treated with losartan but remains labile. She continues to have a intractable headache (which preceeded her stroke like symptoms by about 4 hours).      9/30- Patient seen today and she is vitally stable. IV Mg, IM Ketorolac and IV morphine were given for HA which resolved. She is for home discharge with home health. F/u with HA clinic for evaluation and assessment.     STROKE DOCUMENTATION   Acute Stroke Times   Last Known Normal Time: 0230  Symptom Onset Date: 09/28/18  Symptom Onset Time: (unknown)  Stroke Team Called Date: 09/28/18  Stroke Team Called Time: 0813  Stroke Team Arrival Date: 09/28/18  Stroke Team Arrival Time: 0820  CT Interpretation Time: 0821      NIH Scale:  1a. Level Of Consciousness: 0-->Alert: keenly responsive  1b. LOC Questions: 0-->Answers both questions correctly  1c. LOC Commands: 0-->Performs both tasks correctly  2. Best Gaze: 0-->Normal  3. Visual: 0-->No visual loss  4. Facial Palsy: 0-->Normal symmetrical movements  5a. Motor Arm, Left: 0-->No drift: limb holds 90 (or 45) degrees for full 10 secs  5b. Motor Arm, Right: 0-->No drift: limb holds 90 (or 45) degrees for full 10 secs  6a. Motor Leg, Left: 0-->No drift: leg holds 30 degree position for full 5 secs  6b. Motor Leg, Right: 0-->No drift: leg holds 30 degree position for full 5 secs  7. Limb Ataxia: 0-->Absent  8. Sensory: 0-->Normal: no sensory loss  9. Best Language: 0-->No aphasia: normal  10. Dysarthria: 0-->Normal  11. Extinction and Inattention (formerly Neglect): 0-->No abnormality  Total  (NIH Stroke Scale): 0           Modified Kittson    Murdock Coma Scale:    ABCD2 Score:    [id number] Score:5  HAS -BLED Score:4  ICH Score:   Hunt & Hearn Classification:       Physical Exam   Constitutional: She is oriented to person, place, and time.   She appears well-developed and well-nourished. No distress.   HENT:   Head: Normocephalic and atraumatic.   Eyes: EOM are normal. Pupils are equal, round, and reactive to light. Right eye exhibits no discharge. Left eye exhibits no discharge. No scleral icterus.   Neck: Normal range of motion.   Cardiovascular: Normal rate and regular rhythm.   Pulmonary/Chest: Effort normal and breath sounds normal. No respiratory distress.   Abdominal: Soft. Bowel sounds are normal. There is no tenderness.   Musculoskeletal: Normal range of motion. She exhibits no deformity.   Neurological: She is alert and oriented to person, place, and time. No cranial nerve deficit.   At admission, she had LUE and LLE weakness and decreased in sensation when compare it to the R side. Symptoms has improved.  Skin: Skin is warm and dry. Capillary refill takes less than 2 seconds. She is not diaphoretic.   Psychiatric: She has a normal mood and affect.         Neurological Exam:   LOC: alert and oriented  Attention Span: Good   Language: No aphasia  Articulation: No dysarthria  Orientation: Person, Place, Time   Visual Fields: Full  EOM (CN III, IV, VI): Full/intact  Pupils (CN II, III): PERRL  Facial Sensation (CN V): Normal on the R side and diminished on the L.  Facial Movement (CN VII): Normal bilaterally  Motor:   Arm left 5/5  Leg left  5/5  Arm right  Normal 5/5  Leg right Normal 5/5  Sensation: Intact to light touch, temperature and vibration  Tone: Normal tone throughout              Assessment/Plan:      Diagnostic Results:        Brain Imaging:   CTH on 9/28 showed No evidence of acute hemorrhage or major vascular distribution infarct. Small remote right cerebellar infarct.     Vessel  Imaging:  None     Cardiac Evaluation:   ECHO 9/28    1 - Normal left ventricular systolic function (EF 55-60%).     2 - Biatrial enlargement.     3 - No wall motion abnormalities.     4 - Indeterminate LV diastolic function.     5 - Normal right ventricular systolic function .     6 - The estimated PA systolic pressure is 25 mmHg.     7 - Mild tricuspid regurgitation.            Interventions: None     Complications: N/A     Disposition:             Final Active Diagnoses:     Diagnosis Date Noted POA    PRINCIPAL PROBLEM:  Acute ischemic stroke [I63.9] 09/28/2018 Yes    Long term (current) use of anticoagulants [Z79.01] 03/10/2017 Not Applicable    Paroxysmal atrial fibrillation [I48.0] 03/09/2017 Yes    Headaches due to old head injury [G44.309, S09.90XS] 01/23/2017 Not Applicable       Chronic    Essential hypertension [I10] 05/30/2015 Yes       Chronic    History of CVA (cerebrovascular accident) [Z86.73] 12/03/2013 Not Applicable    HLD (hyperlipidemia) [E78.5] 09/05/2013 Yes       Chronic       Problems Resolved During this Admission:          * Acute ischemic stroke     Ms Chawla is a 54 yo woman with Afib (recently off Xarelto per Dr. 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      DDx: migraine, cardioembolic stroke  Other ddx considered: HTN emergency, carotid stenosis, TIA     CTH at ED showed no evidence of acute hemorrhage or major vascular distribution infarct.   MRI unable to perform due to CRT-D device.  ECHO: shows normal EF, biatrial enlargement   CTA (4/2018): bl carotid stenosis <50%, hypoplastic posterior circulation.      -Sx onset was >4.5 hrs, did not receive TPA or mech thrombectomy  -Medical management with Plavix 75mg and Xarelto  -BP is labile despite she is on Carvedilol  12.5 BID and Losartan 50. F/u with HA clinic at time of discharge.  -Diet started.   -Patient has no more HA after she received HA cocktail (IV Mg, IM Ketorolac and IV morphine).  -Ready for discharge with home health.                Long term (current) use of anticoagulants     -she is on Xarelto and Plavix at home  -she is complaint with med. Was briefly off of Xarelto for nose bleed. If recurrence of nose bleed prefer embolization rather than removing Anticoagulation.              Paroxysmal atrial fibrillation     AC with Xarelto  Also on plavix for stroke prevention          Essential hypertension     -resume losartan. Restart coreg.   -see above regarding BP control             History of CVA (cerebrovascular accident)     -she had a hx of CVA with L sided weakness on 2013.  -no residual deficits since.  -she reports hx of TIA and R sided weakness on May 2018.  -she is compliant with anticoagulation meds at home.          HLD (hyperlipidemia)     -resume rosuvastatin tablet 40 mg.                Recommendations:      Post-discharge complication risks: None. Patient is stable and can resume her regular daily activities. F/u with HA clinic for evaluation and assessment, control BP. Patient advised to come to the ED if she develops any symptoms of stroke or seizures.     Stroke Education given to: patient     Follow-up in Stroke Clinic in: no need.     Discharge Plan:  Antithrombotics: Clopidogrel 75mg  Statin: Rosuvastatin 40 mg  Anticoagulant: Rivaroxaban  Aggresive risk factor modification:  Hypertension  High Cholesterol  Obesity  Atrial Fibrillation     Follow Up:      Follow-up Information      Schedule an appointment as soon as possible for a visit with David Cortez MD.    Specialty:  Neurology  Why:  Headache evaluation  Contact information:  [address] [place] [id number] [phone]                         Patient Instructions:   No discharge procedures on file.     Medications:  Reconciled  Home Medications:            Medication List        CHANGE how you take these medications    magnesium 200 mg Tab  Take 200 mg by mouth once daily.  What changed:  when to take this          CONTINUE taking these medications    AIMOVIG AUTOINJECTOR 70 mg/mL Atin  Generic drug:  erenumab-aooe  Inject 70 mg into the skin every 30 days.      ARIPiprazole 5 MG Tab  Commonly known as:  ABILIFY  Take 1 tablet (5 mg total) by mouth every evening.      baclofen 20 MG tablet  Commonly known as:  LIORESAL  Take 1 tablet (20 mg total) by mouth 3 (three) times daily. for 10 days      carvedilol 25 MG tablet  Commonly known as:  COREG  TAKE 1 TABLET(25 MG) BY MOUTH TWICE DAILY WITH MEALS      cetirizine 10 MG tablet  Commonly known as:  ZYRTEC  Take 1 tablet (10 mg total) by mouth once daily.      clonazePAM 1 MG tablet  Commonly known as:  KLONOPIN  Take 1 tablet (1 mg total) by mouth daily as needed for Anxiety.      clopidogrel 75 mg tablet  Commonly known as:  PLAVIX  Take 1 tablet (75 mg total) by mouth once daily.      donepezil 10 MG tablet  Commonly known as:  ARICEPT  Take 1 tablet (10 mg total) by mouth 2 (two) times daily.      DULoxetine 60 MG capsule  Commonly known as:  CYMBALTA  Take 1 capsule (60 mg total) by mouth 2 (two) times daily.      gabapentin 300 MG capsule  Commonly known as:  NEURONTIN  Take 3 capsules (900 mg total) by mouth 3 (three) times daily.      hydrOXYzine 50 MG tablet  Commonly known as:  ATARAX  Take 1 tablet (50 mg total) by mouth nightly as needed.      losartan 50 MG tablet  Commonly known as:  COZAAR  Take 1 tablet (50 mg total) by mouth once daily.      meloxicam 15 MG tablet  Commonly known as:  MOBIC  Take 1 tablet (15 mg total) by mouth once daily.      ondansetron 4 MG Tbdl  Commonly known as:  ZOFRAN-ODT  Take 1 tablet (4 mg total) by mouth every 12 (twelve) hours as needed (nausea).      pantoprazole 40 MG tablet  Commonly known as:  PROTONIX  Take 1 tablet (40 mg total) by mouth  once daily.      rivaroxaban 20 mg Tab  Commonly known as:  XARELTO  Take 1 tablet (20 mg total) by mouth daily with dinner or evening meal.      rosuvastatin 40 MG Tab  Commonly known as:  CRESTOR  Take 1 tablet (40 mg total) by mouth every evening.      tiaGABine 4 MG tablet  Commonly known as:  GABITRIL  Take 1 tablet (4 mg total) by mouth every evening.      triamcinolone acetonide 0.1% 0.1 % cream  Commonly known as:  KENALOG  AAA bid to rash up to 2 weeks      zolpidem 12.5 MG CR tablet  Commonly known as:  AMBIEN CR  TAKE 1 TABLET BY MOUTH NIGHTLY AS NEEDED FOR INSOMNIA          STOP taking these medications    diphth,pertus(acell),tetanus 2.5-8-5 Lf-mcg-Lf/0.5mL Susp  Commonly known as:  BOOSTRIX      LORazepam 1 MG tablet  Commonly known as:  ATIVAN      topiramate 100 MG tablet  Commonly known as:  TOPAMAX                Bry Taylor MD  [facility]  Department of Vascular Neurology   [facility]           Cosigned by: Jose Benavides MD at 9/30/2018  3:29 PM   Electronically signed by Bry Taylor MD at 9/30/2018  1:05 PM  Electronically signed by Jose Benavides MD at 9/30/2018  3:29 PM       ED to Hosp-Admission (Discharged) on 9/28/2018            Revision History            Detailed Report           Note shared with patient     __________________________________________    Today:  Ms Woo presents to  today for hospital follow up.  Since most recent discharge, does not endorse fever, chills, coughing, sob (resting or with mobility), chest discomforts, headaches, dizziness, recent change in vision or falls.       Review of Systems:  Eyes: denies visual changes at this time denies floaters   ENT: no nasal congestion or sore throat  Respiratory: no cough or shortness of breath  Cardiovascular: no chest pain or palpitations  Gastrointestinal: no nausea or vomiting, no abdominal pain or change in bowel habits  Genitourinary: no hematuria or dysuria;  denies frequency  Hematologic/Lymphatic: no easy bruising or lymphadenopathy  Musculoskeletal: no arthralgias or myalgias  Neurological: no seizures or tremors  Endocrine: no heat or cold intolerance      PAST HISTORY:     Past Medical History:   Diagnosis Date    Anticoagulant long-term use     Anxiety     Asthma     Atrial fibrillation     Brain anoxic injury     Cervicalgia 8/28/2014    Decreased ROM of left shoulder 4/12/2017    Defibrillator activation 2013    Depression     Heart block     History of sudden cardiac arrest 2/2013    PEA arrest with subsequent long-QT    Hx of psychiatric care     Hypertension     Left atrial enlargement 4/11/2018    Pacemaker 2013    Paresthesia 11/1/2013    Prolonged Q-T interval on ECG 2/8/2013    Psychiatric problem     Seizures     Sleep difficulties     Stroke     weakness lt side    Therapy     Thyroid disease     Upper airway resistance syndrome 2/21/2017       Past Surgical History:   Procedure Laterality Date    APPENDECTOMY      breast reduction      CARDIAC DEFIBRILLATOR PLACEMENT      CARDIAC DEFIBRILLATOR PLACEMENT      CARPAL TUNNEL RELEASE Right     colon  N/A 6/24/2014    Performed [facility]ante [facility] at [facility] ENDO (2ND FLR)    COSMETIC SURGERY      egd N/A 6/24/2014    Performed by [facility] at [facility] ENDO (2ND FLR)    HERNIA REPAIR      HIATAL HERNIA REPAIR      INSERT / REPLACE / REMOVE PACEMAKER  10/2017    CRT-D upgrade    INSERTION, CARDIAC PACEMAKER, DUAL CHAMBER N/A 2/15/2013    Performed by [facility]to Martins [facility] at [facility] CATH LAB    INSERTION-ICD-BIVENTRICULAR N/A 10/13/2017    Performed by A[facility]ino Mejia MD at [facility] CATH LAB    RELEASE-CARPAL TUNNEL Left 2/3/2017    Performed by Matt Pugh Jr., [facility] [place] OR    RELEASE-CARPAL TUNNEL Right 12/9/2016    Performed by Matt Pugh Jr., MD at [facility] [place] OR    TOTAL REDUCTION MAMMOPLASTY      TUBAL LIGATION      VENOGRAM N/A 10/13/2017    Performed by Avelino ABREU  MD [facility] LAB       Family History   Problem Relation Age of Onset    Hypertension Mother     COPD Unknown     Heart failure Unknown     Glaucoma Maternal Grandmother     Glaucoma Paternal Grandmother        Social History     Socioeconomic History    Marital status: Single     Spouse name: Not on file    Number of children: Not on file    Years of education: Not on file    Highest education level: Not on file   Social Needs    Financial resource strain: Not on file    Food insecurity - worry: Not on file    Food insecurity - inability: Not on file    Transportation needs - medical: Not on file    Transportation needs - non-medical: Not on file   Occupational History     Employer: [employer]   Tobacco Use    Smoking status: Never Smoker    Smokeless tobacco: Never Used   Substance and Sexual Activity    Alcohol use: No    Drug use: No    Sexual activity: Not Currently   Other Topics Concern    Patient feels they ought to cut down on drinking/drug use Not Asked    Patient annoyed by others criticizing their drinking/drug use Not Asked    Patient has felt bad or guilty about drinking/drug use Not Asked    Patient has had a drink/used drugs as an eye opener in the AM Not Asked    Are you pregnant or think you may be? Not Asked    Breast-feeding Not Asked   Social History Narrative    Not on file       MEDICATIONS & ALLERGIES:     Current Outpatient Medications on File Prior to Visit   Medication Sig Dispense Refill    ARIPiprazole (ABILIFY) 5 MG Tab Take 1 tablet (5 mg total) by mouth every evening. 30 tablet 5    baclofen (LIORESAL) 20 MG tablet Take 1 tablet (20 mg total) by mouth 3 (three) times daily. for 10 days 30 tablet 0    carvedilol (COREG) 25 MG tablet TAKE 1 TABLET(25 MG) BY MOUTH TWICE DAILY WITH MEALS 180 tablet 3    cetirizine (ZYRTEC) 10 MG tablet Take 1 tablet (10 mg total) by mouth once daily. 90 tablet 11    clonazePAM (KLONOPIN) 1 MG tablet  Take 1 tablet (1 mg total) by mouth daily as needed for Anxiety. 30 tablet 5    clopidogrel (PLAVIX) 75 mg tablet Take 1 tablet (75 mg total) by mouth once daily. 90 tablet 2    donepezil (ARICEPT) 10 MG tablet Take 1 tablet (10 mg total) by mouth 2 (two) times daily. 180 tablet 3    DULoxetine (CYMBALTA) 60 MG capsule Take 1 capsule (60 mg total) by mouth 2 (two) times daily. 60 capsule 5    erenumab-aooe (AIMOVIG AUTOINJECTOR) 70 mg/mL AtIn Inject 70 mg into the skin every 30 days. 1 mL 6    gabapentin (NEURONTIN) 300 MG capsule Take 3 capsules (900 mg total) by mouth 3 (three) times daily. 810 capsule 12    hydrOXYzine (ATARAX) 50 MG tablet Take 1 tablet (50 mg total) by mouth nightly as needed. 30 tablet 5    losartan (COZAAR) 50 MG tablet Take 1 tablet (50 mg total) by mouth once daily. 90 tablet 3    magnesium 200 mg Tab Take 200 mg by mouth once daily. (Patient taking differently: Take 200 mg by mouth every other day. ) 30 each 12    meloxicam (MOBIC) 15 MG tablet Take 1 tablet (15 mg total) by mouth once daily. 30 tablet 3    ondansetron (ZOFRAN-ODT) 4 MG TbDL Take 1 tablet (4 mg total) by mouth every 12 (twelve) hours as needed (nausea). 10 tablet 1    pantoprazole (PROTONIX) 40 MG tablet Take 1 tablet (40 mg total) by mouth once daily. 30 tablet 11    rivaroxaban (XARELTO) 20 mg Tab Take 1 tablet (20 mg total) by mouth daily with dinner or evening meal. 30 tablet 11    rosuvastatin (CRESTOR) 40 MG Tab Take 1 tablet (40 mg total) by mouth every evening. 90 tablet 3    sennosides (EX-LAX, SENNOSIDES,) 15 mg Tab Take 30 mg by mouth daily as needed.      tiaGABine (GABITRIL) 4 MG tablet Take 1 tablet (4 mg total) by mouth every evening. 30 tablet 12    triamcinolone acetonide 0.1% (KENALOG) 0.1 % cream AAA bid to rash up to 2 weeks 60 g 1    zolpidem (AMBIEN CR) 12.5 MG CR tablet TAKE 1 TABLET BY MOUTH NIGHTLY AS NEEDED FOR INSOMNIA 30 tablet 0     Current Facility-Administered Medications on  File Prior to Visit   Medication Dose Route Frequency Provider Last Rate Last Dose    onabotulinumtoxina injection 200 Units  200 Units Intramuscular Q90 Days David Cortez MD   200 Units at 08/02/18 1112    onabotulinumtoxina injection 200 Units  200 Units Intramuscular Q90 Days Davdi Cortez MD            Review of patient's allergies indicates:   Allergen Reactions    Aspirin Hives    Imitrex [sumatriptan] Palpitations    Penicillins Hives and Swelling    Shellfish containing products Anaphylaxis     seafood    Percocet [oxycodone-acetaminophen] Itching     States can take    Reglan [metoclopramide hcl] Other (See Comments)     Parkinsonism        OBJECTIVE:     Vital Signs:  There were no vitals filed for this visit.  Wt Readings from Last 1 Encounters:   10/03/18 1448 130 kg (286 lb 9.6 oz)     There is no height or weight on file to calculate BMI.     Wt Readings from Last 3 Encounters:   10/09/18 123.4 kg (272 lb 0.8 oz)   10/03/18 130 kg (286 lb 9.6 oz)   10/02/18 130.4 kg (287 lb 7.7 oz)     Temp Readings from Last 3 Encounters:   10/02/18 98.1 °F (36.7 °C)   09/30/18 98.1 °F (36.7 °C) (Oral)   09/25/18 98 °F (36.7 °C)     BP Readings from Last 3 Encounters:   10/09/18 110/70   10/03/18 118/78   10/02/18 138/78     Pulse Readings from Last 3 Encounters:   10/09/18 84   10/03/18 89   10/02/18 77         Physical Exam:  General: Well developed, well nourished. No distress.  HEENT: Head is normocephalic, atraumatic; ears are normal.   Eyes: Clear conjunctiva.  Neck: Supple, symmetrical neck; trachea midline.  Lungs: Clear to auscultation bilaterally and normal respiratory effort.  Cardiovascular: Heart with regular rate and rhythm. No murmurs, gallops or rubs with Pacemaker  Extremities: No LE edema. Pulses 2+ and symmetric.   Abdomen: Abdomen is soft, non-tender, distended with normal bowel sounds.  Skin: Skin color, texture, turgor normal. No rashes.  Musculoskeletal: Normal gait.   Lymph Nodes: No  cervical or supraclavicular adenopathy.  Neurologic: No focal numbness or weakness.   Psychiatric: Not depressed.        Laboratory  Lab Results   Component Value Date    WBC 8.59 09/30/2018    HGB 11.8 (L) 09/30/2018    HCT 35.3 (L) 09/30/2018    MCV 90 09/30/2018     09/30/2018     BMP  Lab Results   Component Value Date     09/30/2018    K 3.8 09/30/2018     09/30/2018    CO2 23 09/30/2018    BUN 21 (H) 09/30/2018    CREATININE 0.9 09/30/2018    CALCIUM 8.9 09/30/2018    ANIONGAP 8 09/30/2018    ESTGFRAFRICA >60.0 09/30/2018    EGFRNONAA >60.0 09/30/2018     Lab Results   Component Value Date    ALT 17 09/30/2018    AST 11 09/30/2018    ALKPHOS 66 09/30/2018    BILITOT 0.1 09/30/2018     Lab Results   Component Value Date    INR 1.1 09/29/2018    INR 1.2 09/28/2018    INR 1.0 04/12/2018     Lab Results   Component Value Date    HGBA1C 5.9 (H) 09/28/2018     No results for input(s): POCTGLUCOSE in the last 72 hours.      TRANSITION OF CARE:     [facility] Contact Note:  10/2/2018    Family and/or Caretaker present at visit?  Yes.  Diagnostic tests reviewed/disposition: I have reviewed all completed as well as pending diagnostic tests at the time of discharge.  Disease/illness education:  CVA, A Fib, HTN, Seizures, SHIRA, NOAC, Meds  Home health/community services discussion/referrals: Patient does not have home health established from hospital visit.  They do not need home health.  If needed, we will set up home health for the patient.   Establishment or re-establishment of referral orders for community resources: No other necessary community resources.   Discussion with other health care providers: No discussion with other health care providers necessary.     Transition of Care Visit:     I have reviewed and updated the history and problem list.  I have reconciled the medication list.  I have discussed the hospitalization and current medical issues, prognosis and plans with the  "patient/family.  I  spent more than 50% of time discussing the care with the patient/family.  Total Encounter in the [facility]: 60 minutes.    Medications Reconciliation:   I have reconciled the patient's home medications and discharge medications with the patient/family. I have updated all changes.  Refer to After-Visit Medication List.    ASSESSMENT & PLAN:     HIGH RISK CONDITION(S):        Recent admission for Acute Embolic CVA in the setting of chronic Atrial Fibrillation:   *9/28: CT head: - bleed, + Small right infarct  9/28: 2 D echo: 55%, + DD, PAP: 25  9/28: CXR: -  *Of note, missed window of timeframe for TPA administration   - continue Plavix (Xarelto)  - continue Crestor   - follow up with Vascular Neuro (apt: 10/30)  Lab Results   Component Value Date    CHOL 101 (L) 09/28/2018    CHOL 159 04/11/2018    CHOL 145 02/14/2018     Lab Results   Component Value Date    HDL 35 (L) 09/28/2018    HDL 38 (L) 04/11/2018    HDL 37 (L) 02/14/2018     Lab Results   Component Value Date    LDLCALC 56.6 (L) 09/28/2018    LDLCALC 95.2 04/11/2018    LDLCALC 93.0 02/14/2018     Lab Results   Component Value Date    TRIG 47 09/28/2018    TRIG 129 04/11/2018    TRIG 75 02/14/2018     Lab Results   Component Value Date    CHOLHDL 34.7 09/28/2018    CHOLHDL 23.9 04/11/2018    CHOLHDL 25.5 02/14/2018   - amb referral to Outpatient PT and OT for Evaluation and Treatment (noted C orders in system, but patient reports, "no one called or came to eval").         Paroxysmal Atrial Fibrillation, on Chronic NOAC:   *HR today: 84 (controlled)   - continue Coreg 25/25  - continue Xarelto   - follow up with EP Cards  *Seen by Dr. Mejia on 10/3 for follow up  No changes; suggested follow up for in 6 months.        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   *Seen by [facility] Clinic on 9/25        Chronic Headaches:   - continue Gabitril   Follow up with Dr. Cortez (established with this physician)      Chronic Anxiety and Depression:   (stable)  - continue Klonopin   - continue Cymbalta   *Being followed by Psychiatry (defer to the prescriber)        Chronic Insomnia:   (controlled)  - Ambien and Atarax   *Defer to the prescriber        History of Non-ICM, ICD Implanted:   - continue Coreg, Losartan  *Seen by Dr. Mejia on 10/3 for follow up  No changes; suggested follow up for in 6 months.   *9/28: 2 D echo: 55%, + DD, PAP: 25      Constipation:   (Resolved)  Seen in  on 10/2 to address         *Of note, on Multiple medications for various different medical issues, High risk for Drug - Drug Interactions:   (Defer to her PCP / Primary Prescribers for mgt; not following up with this provider for mgt in regards):   Ambien   Neurontin  Atarax   Cymbalta  Aricept   Abilify  Klonopin (Psych: for chronic Anxiety)        Immunizations:   TD on 10/2 when seen in  (up to date)    Flu Vaccine: 9/30/2018 (up to date)     Per ACIP guidelines, she is considered HIGH risk and needs to begin the Pneumonia Vaccine Series:   Prevnar 13: today  Will need the PPSV 23 in 6-12 months.       Priority Clinic Visit (Post Discharge Follow-up) Today:   - Our clinic physicians and nurses plan to follow the patient up for any medical issues in the Priority Clinic for 30 days post discharge.    Future Appointments   Date Time Provider Department Center   10/17/2018 11:00 AM David Cortez MD [facility] EPI [address]   10/25/2018  8:30 AM Tiffany Mina MD [facility] IM [address] PCW   10/30/2018  2:00 PM Missy Rock MD [facility]   11/12/2018  8:00 AM HOME MONITOR DEVICE CHECK, [facility] ARRHYTH [address]   2/5/2019  9:00 AM Missy Rock MD [facility]   2/11/2019   8:00 AM HOME MONITOR DEVICE CHECK, [facility] [place]   5/13/2019  8:00 AM HOME MONITOR DEVICE CHECK, [facility] [address]        Medication List           Accurate as of 10/9/18  2:48 PM. If you have any questions, ask your nurse or doctor.               CHANGE how you take these medications    magnesium 200 mg Tab  Take 200 mg by mouth once daily.  What changed:  when to take this        CONTINUE taking these medications    AIMOVIG AUTOINJECTOR 70 mg/mL [place]  Generic drug:  erenumab-aooe  Inject 70 mg into the skin every 30 days.     ARIPiprazole 5 MG Tab  Commonly known as:  ABILIFY  Take 1 tablet (5 mg total) by mouth every evening.     carvedilol 25 MG tablet  Commonly known as:  COREG  TAKE 1 TABLET(25 MG) BY MOUTH TWICE DAILY WITH MEALS     clonazePAM 1 MG tablet  Commonly known as:  KLONOPIN  Take 1 tablet (1 mg total) by mouth daily as needed for Anxiety.     clopidogrel 75 mg tablet  Commonly known as:  PLAVIX  Take 1 tablet (75 mg total) by mouth once daily.     donepezil 10 MG tablet  Commonly known as:  ARICEPT  Take 1 tablet (10 mg total) by mouth 2 (two) times daily.     DULoxetine 60 MG capsule  Commonly known as:  CYMBALTA  Take 1 capsule (60 mg total) by mouth 2 (two) times daily.     gabapentin 300 MG capsule  Commonly known as:  NEURONTIN  Take 3 capsules (900 mg total) by mouth 3 (three) times daily.     hydrOXYzine 50 MG tablet  Commonly known as:  ATARAX  Take 1 tablet (50 mg total) by mouth nightly as needed.     losartan 50 MG tablet  Commonly known as:  COZAAR  Take 1 tablet (50 mg total) by mouth once daily.     ondansetron 4 MG Tbdl  Commonly known as:  ZOFRAN-ODT  Take 1 tablet (4 mg total) by mouth every 12 (twelve) hours as needed (nausea).     pantoprazole 40 MG tablet  Commonly known as:  PROTONIX  Take 1 tablet (40 mg total) by mouth once daily.     rivaroxaban 20 mg Tab  Commonly known as:  XARELTO  Take 1 tablet (20 mg total) by mouth daily with dinner or evening  meal.     rosuvastatin 40 MG Tab  Commonly known as:  CRESTOR  Take 1 tablet (40 mg total) by mouth every evening.     tiaGABine 4 MG tablet  Commonly known as:  GABITRIL  Take 1 tablet (4 mg total) by mouth every evening.     zolpidem 12.5 MG CR tablet  Commonly known as:  AMBIEN CR  TAKE 1 TABLET BY MOUTH NIGHTLY AS NEEDED FOR INSOMNIA        STOP taking these medications    baclofen 20 MG tablet  Commonly known as:  LIORESAL  Stopped by:  INOCENCIA Leonard     cetirizine 10 MG tablet  Commonly known as:  ZYRTEC  Stopped by:  INOCENCIA Leonard     EX-LAX (SENNOSIDES) 15 mg Tab  Generic drug:  sennosides  Stopped by:  INOCENCIA Leonard     meloxicam 15 MG tablet  Commonly known as:  MOBIC  Stopped by:  INOCENCIA Leonard     triamcinolone acetonide 0.1% 0.1 % cream  Commonly known as:  KENALOG  Stopped by:  INOCENCIA Leonard          Signing Physician:  INOCENCIA Leonard

## 2018-10-09 ENCOUNTER — OFFICE VISIT (OUTPATIENT)
Dept: PRIMARY CARE CLINIC | Facility: CLINIC | Age: 53
End: 2018-10-09
Payer: MEDICARE

## 2018-10-09 ENCOUNTER — CLINICAL SUPPORT (OUTPATIENT)
Dept: INTERNAL MEDICINE | Facility: CLINIC | Age: 53
End: 2018-10-09
Payer: MEDICARE

## 2018-10-09 VITALS
HEART RATE: 84 BPM | HEIGHT: 64 IN | SYSTOLIC BLOOD PRESSURE: 110 MMHG | DIASTOLIC BLOOD PRESSURE: 70 MMHG | BODY MASS INDEX: 46.45 KG/M2 | OXYGEN SATURATION: 98 % | WEIGHT: 272.06 LBS

## 2018-10-09 DIAGNOSIS — E78.49 OTHER HYPERLIPIDEMIA: Chronic | ICD-10-CM

## 2018-10-09 DIAGNOSIS — Z23 VACCINE FOR DIPHTHERIA-TETANUS-PERTUSSIS, COMBINED: Primary | ICD-10-CM

## 2018-10-09 DIAGNOSIS — G47.33 OSA (OBSTRUCTIVE SLEEP APNEA): ICD-10-CM

## 2018-10-09 DIAGNOSIS — I42.8 NONISCHEMIC CARDIOMYOPATHY: ICD-10-CM

## 2018-10-09 DIAGNOSIS — Z86.73 HISTORY OF CVA (CEREBROVASCULAR ACCIDENT): ICD-10-CM

## 2018-10-09 DIAGNOSIS — I63.9 ACUTE ISCHEMIC STROKE: Primary | ICD-10-CM

## 2018-10-09 DIAGNOSIS — G47.00 INSOMNIA, UNSPECIFIED TYPE: ICD-10-CM

## 2018-10-09 DIAGNOSIS — F33.1 DEPRESSION, MAJOR, RECURRENT, MODERATE: ICD-10-CM

## 2018-10-09 DIAGNOSIS — Z79.01 LONG TERM (CURRENT) USE OF ANTICOAGULANTS: ICD-10-CM

## 2018-10-09 DIAGNOSIS — I10 ESSENTIAL HYPERTENSION: Chronic | ICD-10-CM

## 2018-10-09 DIAGNOSIS — I48.0 PAROXYSMAL ATRIAL FIBRILLATION: ICD-10-CM

## 2018-10-09 DIAGNOSIS — F41.9 ANXIETY: ICD-10-CM

## 2018-10-09 PROCEDURE — 90670 PCV13 VACCINE IM: CPT | Mod: PBBFAC

## 2018-10-09 PROCEDURE — 99999 PR PBB SHADOW E&M-EST. PATIENT-LVL V: CPT | Mod: PBBFAC,,, | Performed by: NURSE PRACTITIONER

## 2018-10-09 PROCEDURE — 99495 TRANSJ CARE MGMT MOD F2F 14D: CPT | Mod: PBBFAC | Performed by: NURSE PRACTITIONER

## 2018-10-09 PROCEDURE — 99495 TRANSJ CARE MGMT MOD F2F 14D: CPT | Mod: S$PBB,,, | Performed by: NURSE PRACTITIONER

## 2018-10-09 PROCEDURE — 99215 OFFICE O/P EST HI 40 MIN: CPT | Mod: PBBFAC | Performed by: NURSE PRACTITIONER

## 2018-10-09 NOTE — PATIENT INSTRUCTIONS
1) Referral has been Place for Outpatient PT and OT; if you have not heard from their office within the next 1-2 weeks, call our clinic.     2) Call with any questions.    Priority Clinic Visit (Post Discharge Follow-up) Today:   - Our clinic physicians and nurses plan to follow the patient up for any medical issues in the [facility] Clinic for 30 days post discharge.      Future Appointments   Date Time Provider Department Center   10/17/2018 11:00 AM David Cortez MD [facility] EPI [address]   10/25/2018  8:30 AM MD [facility] IM [address] PCW   10/30/2018  2:00 PM Missy Rock MD [facility]O [facility]   11/12/2018  8:00 AM HOME MONITOR DEVICE CHECK, [facility] [address]   2/5/2019  9:00 AM Missy Rock MD [facility]   2/11/2019  8:00 AM HOME MONITOR DEVICE CHECK, [facility] [address]   5/13/2019  8:00 [facility] [address]

## 2018-10-15 ENCOUNTER — HOME CARE VISIT (OUTPATIENT)
Dept: NEUROLOGY | Facility: HOSPITAL | Age: 53
End: 2018-10-15

## 2018-10-16 ENCOUNTER — TELEPHONE (OUTPATIENT)
Dept: NEUROLOGY | Facility: CLINIC | Age: 53
End: 2018-10-16

## 2018-10-16 NOTE — TELEPHONE ENCOUNTER
----- Message from Laina Nair RN sent at 10/16/2018  2:20 PM CDT -----  Contact: pt @ [phone]      ----- Message -----  From: Acacia Dukes  Sent: 10/16/2018   2:15 PM  To: [facility] Staff    Asking to reschedule her Botox appt on tomorrow 10/17 @ 11am to a later time during the day (after 2 noon). Please call before 4pm.

## 2018-10-19 ENCOUNTER — PROCEDURE VISIT (OUTPATIENT)
Dept: NEUROLOGY | Facility: CLINIC | Age: 53
End: 2018-10-19
Payer: MEDICARE

## 2018-10-19 VITALS
DIASTOLIC BLOOD PRESSURE: 87 MMHG | WEIGHT: 282.19 LBS | HEART RATE: 76 BPM | BODY MASS INDEX: 48.18 KG/M2 | HEIGHT: 64 IN | SYSTOLIC BLOOD PRESSURE: 137 MMHG

## 2018-10-19 DIAGNOSIS — M54.81 BILATERAL OCCIPITAL NEURALGIA: ICD-10-CM

## 2018-10-19 DIAGNOSIS — E66.01 OBESITY, CLASS III, BMI 40-49.9 (MORBID OBESITY): ICD-10-CM

## 2018-10-19 DIAGNOSIS — G43.711 CHRONIC MIGRAINE WITHOUT AURA, WITH INTRACTABLE MIGRAINE, SO STATED, WITH STATUS MIGRAINOSUS: Primary | ICD-10-CM

## 2018-10-19 PROCEDURE — 64615 CHEMODENERV MUSC MIGRAINE: CPT | Mod: PBBFAC | Performed by: PSYCHIATRY & NEUROLOGY

## 2018-10-19 PROCEDURE — 64615 CHEMODENERV MUSC MIGRAINE: CPT | Mod: S$PBB,,, | Performed by: PSYCHIATRY & NEUROLOGY

## 2018-10-19 RX ADMIN — ONABOTULINUMTOXINA 200 UNITS: 100 INJECTION, POWDER, LYOPHILIZED, FOR SOLUTION INTRADERMAL; INTRAMUSCULAR at 05:10

## 2018-10-19 NOTE — PROCEDURES
Follow up:   4/week HA - L vertex   Jabs - vertex either sides     She reports migraine that woke her up in the morning - associated with L side facial droop     Prior note:   She gets acceptable relief for 2.5 months after BOTOX      Prior note:   No recurrent stroke symptoms   starting having whole body tremors since this AM. Took 1 tablet of lorazepam   Last night had a HA, took trazodone       Admit Date: 4/11/2018  2:03 PM   Discharge Date and Time: 4/12/2018  8:30 PM   History of Present Illness: Ms. Chawla is a 53 yo F with afib on Eliquis (last dose this am), prior cardiac arrest with associated acute ischemic stroke with residual mild L sided weakness, CAD with ICD in situ, HTN, and HLD who presented with acute R sided weakness and sensation changes.  She originally called EMS for palpitations, but developed acute right sided facial droop and RUE weakness at 1:40pm today, after EMS had arrived.  She denies changes to speech or vision.  SBP en route 200/100.  She is ineligible for tPA 2/2 Eliquis use.  She is not suspected to have an LVO.  She will be admitted to  for observation overnight.     Hospital Course (synopsis of major diagnoses, care, treatment, and services provided during the course of the hospital stay): Ms. Chawla was admitted for acute stroke workup. Pt with pacemaker so unable to obtain MRI Brain; CTA H/N demonstrated no evidence acute infarction, though did exhibit noncalcified plaquing of carotid bifurcations and proximal ICAs with < 50% stenosis, so Plavix was added to pt's daily home regimen. Concerned for TIA at this time though Migraine very high on differential due to pt's hx headaches, including presently this admission. Hypertensive urgency/emergency also considered due to pt's very elevated levels with EMS. Per chart review, Eliquis was a new medication since 4/3/18 (had switched from Coumadin), however staff Fraaz concerned that pt had a potential event while on Eliquis. She  wished to switch to Pradaxa as an alternative DOAC (as it has an option for reversal), however changed to Xarelto per pt's insurance coverage.   While admitted, pt given cocktail for HA which she has received previously at the infusion clinic - IV Magnesium, IM Toradol, 2mg IV Morphine, 500cc NS bolus (IV Benadryl not included this time as pt had received a dose earlier that day prior to contrast imaging.) HA improved somewhat and pt was encouraged to follow up with Dr. Cortez for continued management of botox injections, etc. At that time, pt also found with hyponatremia; d/c'd Clorthalidone and plan was made for pt to [facility] 4[facility]16/18 for repeat CMP to evaluate Na level and BP check since discontinuing this medication.  Ms. Chawla was evaluated and treated by PT, OT, and SLP who recommend d/c with outpatient PT and OT. She was discharged home 4/12/18 with [facility] follow-up Monday      Prior note:   2 weeks ago BOTOX effect wore off   Used up all her percocet   3 wks h/o:   Reports frequent falls - falling forward, no LOC, no injuries, able to protect falls by hands   triggers - unknown   Also occ stumbling   Dragging L foot   No Pain in back or legs   No numbness or weakness in legs   No B/B incontinence   No lightheadedness      Prior note:    Flare up of TMJ and HA during daughter's wedding   NSAIDS helped   2 weeks ago BOTOX effect wore off      Prior note:   2 weeks ago BOTOX effect wore off   Has severe spasm and pain in the traps catalina   Weather change has provoked severe migraine + nausea   She started coumadin       Prior note:   3 weeks ago BOTOX effect wore off   She reports severe daily HA    During BOTOX periods she feels she  her HA. Much less intense      Prior note:   The patient is a 51-year-old female who [facility]. Since her last visit, she reports growing frustration   about the fact that nothing has  helped her with regards to her headaches. She   continues to experience daily headaches. She takes mefenamic acid and   tizanidine every night, which only provides her two hours of relief. She is   unable to sleep because of the refractory headaches. She is incapacitated   because of severe headaches. She reports minimal relief with infusions that she  gets on a periodic basis. She does report an improvement overall with the   Botox. However, this effect has worn off in the last two weeks. She also   reports an episode wherein she fell with loss of consciousness, which was not   provoked. As a result, she injured her ankle and is currently wearing a boot.      Prior note:   since last week - Reports vertigo for 6 - 7 minutes upto 3 times daily   Nearly daily severe HA - no response to ponstel , compazine   She is late for her BOTOX - last 2 weeks were painful       Follow up:   R sided Headache is constant max at TMJ on R   Prior note:   She reports new episodes of R face tingling. Sh also reports 2 episodes of blurred vision lasting 15 minutes. These hapended while watching TV. Her pain remains unchanged   Follow up:   2 days of severe HA during Thanksgiving   Pounding bifrontal region   Pain worse over L eye brow   Follow up:   Reports bifrontal severe HA (worse on L) with no nausea with sudden onset . Occurs daily   NO note:  I found no papilledema or other changes to suggest elevated intracranial pressure or other alternative etiology for her headaches. Repeat exam in two years.  HA are less frequent and less intense.   (R levator scapula spasm)   symptoms better with lateral flexion to L   Prior note:   She still has daily HA with severe sharp stabbing pain behind L eye lasting for 15 sec   Prior note:   Daily pain. 2/week - nausea.  Shu Lares RN at 9/17/2014 4:53 PM  Pt presented to clinic for medical advice. Pt is seen by Dr. Paredes and Dr. Cortez.  1) She went ER on 9/13/14 for a migraine. She was  "given Reglan, Benadryl, MSO4 and IVF. A couple days later she developed an all over body "tremor". She states "I think I have Parkinson's". - Per Dr. Paredes, medication related tremor (Reglan & Benadryl). Informed her it should wear off in a few days. If not, she is to call and let us know.  2) Headaches - triggered by insomnia.   Current meds:  Ketoprofen - she took it once and said her "throat closed up" and she's not supposed to have anything with aspirin in it.  Nortriptyline - she was taking it at night, but it didn't help her sleep, so she stopped it.  Per Dr. Cortez, discontinue Ketoprofen and Nortriptyline. Start Effexor XR 37.5mg QD, tizanidine 4mg BID PRN headache and Klonopin 0.25 - 0.5mg QHS PRN. Will schedule pt for sphenocath procedure within the next week.   Prior note:   48 yo woman with history of HTN and asthma, both reportedly controlled, in hospital early 2013 after "passed out" and became unresponsive. CPR in the field for 8 minutes until EMS arrived. Per ED note, on arrival she was found to be in PEA, given epinephrine. Vfib/Vtach was achieved which was shocked x1. Patient converted to sinus tachycardia after shock. I do not know the time course for the above treatment. On arrival to facility patient was in sinus tachycardia with strong pulses, intubated and unresponsive. ET tube placement was confirmed with direct laryngoscopy and good bilateral breath sounds were heard after the tube was pulled back 2 cm. Reported to have "withdrawal" movements in ER during stabilization, then sedated and cooling protocol initiated. Had remarkable   recovery and first seen in clinic in April 2013.   Headache history: cluster   Age of onset - [age]   Location - behind L eye   Nature of pain - sharp   Prodrome - no   Aura - No   Duration of headache - 6-7 min   Time to peak intensity -   Pain scale - range of intensity - 9/10   Frequency - daily   Status Migrainosus history - 1-3 days   Time of day of most " "headaches- anytime   Associated symptoms with the headache:   Red eyes   swelling of L face   Headache history: Migraine   Age of onset -    Location - bifrontal   Nature of pain - Pounding   Prodrome - no   Aura - No   Duration of headache - > 4 hrs   Time to peak intensity -   Pain scale - range of intensity - 9/10   Frequency - 5/week   Status Migrainosus history - 1-3 days   Time of day of most headaches- anytime   Associated symptoms with the headache:   Meningeal symptoms - photophobia, phonophobia, exercise intolerance +   Nausea/vomitting +   Nasal drainage   Visual blurriness +   Pallor/flushing   Dizziness   Vertigo   Confusion   Impaired concentration +   Pain worsened with physical activity +   Neck pain +   Symptoms of increased intracranial pressure:   Whooshing sounds - no   Visual spots/blotches - no   Headache Triggers: unknown   Other comorbid conditions:   Anxiety - no   Motion sickness symptom - no       Treatment history:   Resolution of headache with sleep - yes       Meds tried:   Trigger points involvin/9/15 helped for 1 day   Site: Right   Levator scapula   Pharmacological agent:   0.5% Sensorcaine solution   No complications were noted.  Supraorbital block - helped for 2 days   Tylenol - not help   imitrex - chest tightness   alleve - help   topamax - not helping   Neurontin - not helping   Paxil, Elavil - not help   seroquel - nightmare   Ketoprofen - she took it once and said her "throat closed up" and she's not supposed to have anything with aspirin in it.  Nortriptyline - she was taking it at night, but it didn't help her sleep, so she stopped it.  reglan - parkinsonism   zanaflex - help   Toradol 30 mg IM inj at home - too expensive   BOTOX round #1 9/3/15 - helped (R levator scapula spasm)   Round #2 12/3/15 - helped   Round#3 3/316 - helped   Round #4 16 - helped   BOTOX#5 9/15/16 - helped     BOTOX#6 - 16 - helped   BOTOX#7 - 3/9/17 - helped    BOTOX#8 - 17 - " helped    BOTOX#9 8/10/17 - helped   BOTOX#10 10/19/17 - helped   BOTOX#11 12/27/17 - helped   BOTOX#12 3/7/18 - helped   BOTOX#13 5/16/18 - helped    BOTOX#14 8/1/18 - helped     Can not do RFA - pt has pacemaker   Procedure: IOVERA peripheral nerve focus cold therapy (non ablative cryotherapy) 12/30/15 - not help   Indication: Cryotherapy of select peripheral nerves of the head was performed to treat chronic headaches that failed to respond to several preventive pharmacological therapies.   Sedation: None   Informed consent: The procedure, risks, benefits and options were discussed with the patient. There are no contraindications to the procedure. The patient expressed understanding and agreed to the procedure. I verify that I personally obtained the patient's consent prior to the start of the procedure.  Location:  Bilateral Supra orbital nerve (3 cycles on R and 1 cycle on L)   Bilateral Occipital nerve   3 cycles   Pain relief: Pain score reduced 10/10->0/10   9/26 Bilateral Sphenopalatine Ganglion and bilateral Maxillary Branch (Trigeminal Nerve) Block - no help   ONB - not help                                            Shannon Pagan RN at 12/31/2014 3:54 PM       Status: Signed                   Expand All Collapse All   HEADACHE FASTTRACK  PAIN 7/10 NAUSEA 0/10  ROUND 1: TORADOL, IMITREX, MORPHINE, BENADRYL, AND MAGNESIUM  PAIN 7/10 NAUSEA 0/10  PT STATED SHE WAS READY TO GO HOME AND GO TO SLEEP. PT D/C'ED IN NAD            Shannon Pagan RN at 10/5/2015 12:49 PM       Status: Signed                   Expand All Collapse All   HEADACHE FASTTRACK  PAIN 8/10 NAUSEA 10/10  FIRST ROUND: tORADOL, BENADRYL, COMPAZINE, IMITREX, MAGNESIUM, AND VALPROATE.  PAIN 1/10 NAUSEA 0/10  PT READY TO GO HOME AND FEELING BETTER. PT LEFT IN NAD WITH FAMILY MEMBER  TOTAL TIME: 2022-6074       Shannon Pagan RN at 10/20/2015 3:05 PM       Status: Signed                   Expand All Collapse All   HEADACHE FASTTRACK  PAIN 10/10  NAUSEA 0/10  FIRST ROUND: tORADOL, BENADRYL, COMPAZINE, IMITREX, MAGNESIUM, AND VALPROATE.  BP BEING MONITORED EVERY 15 MIN AND REMAINED 170'S/80-90'S. DR CHOPRA NOTIFIED AND STATED TO MAKE SURE BP DID NOT EXCEED 180 SYSTOLIC OR PT SHOULD REPORT TO ED. BP AT 1500 183/92. DR CHOPRA NOTIFIED AND GAVE ORDER TO STOP INFUSION AND SEND PATIENT TO ED. PT BROUGHT TO ED BY INFUSION NURSE VIA WHEELCHAIR.   PAIN 8/10 NAUSEA 0/10  TOTAL TIME: 7565-8766               Progress Notes                           Shannon Pagan, RN at 2/24/2016 1:36 PM       Status: Signed                  Expand All Collapse All   HEADACHE FASTTRACK  PAIN 10/10 NAUSEA 7/10  FIRST ROUND: tORADOL, BENADRYL, AND MORPHINE-BP RANGING FROM 177-203/84-92, HR 60-82  MD NOTIFIED AND GAVE ORDERS TO SEND TO ED. PT LEFT VIA W/C WITH INFUSION NURSE ON WAY TO ED.            Headache risk factors:   H/o TBI - [facility] (2013) + neck sprain   H/o Meningitis - no   F/h Aneurysms - no       Review of Systems   Constitutional: Negative.   HENT: Negative.   Eyes: Negative.   Respiratory: Negative.   Cardiovascular: Negative.   Gastrointestinal: Negative.   Endocrine: Negative.   Genitourinary: Negative.   Musculoskeletal: Negative.   Skin: Negative.   Allergic/Immunologic: Negative.   Hematological: Negative.   Psychiatric/Behavioral: insomnia      Objective:     Physical Exam   Constitutional: She is oriented to person, place, and time. She appears well-developed.   obesity   HENT:   Head: Normocephalic and atraumatic.   Right Ear: External ear normal.   Left Ear: External ear normal.   Nose: Nose normal.   Mouth/Throat: Oropharynx is clear and moist.   Eyes: Conjunctivae and EOM are normal. Pupils are equal, round, and reactive to light.   Neck: Normal range of motion.   Cardiovascular: Regular rhythm.   Pulmonary/Chest: Effort normal and breath sounds normal.   Musculoskeletal: Normal range of motion.   Neurological: She is alert and oriented to person, place, and time. She has  normal reflexes. She displays normal reflexes. No cranial nerve deficit. She exhibits normal muscle tone. Coordination normal. Uses cane.   Ataxic gait - wide based   Skin: Skin is warm and dry.   Psychiatric: She has a normal mood and flat affect. Her behavior is normal. Judgment and thought content normal.   Headache Exam:  Optic disc is flat OU   Temples appear symmetric  No V1,V2,V3 distribution allodynia/hyperalgesia catalina   No facial swelling  No gross TMJ abnormalities   No ptosis   No conj injection   No meningismus   No neck stiffness  No C spine tenderness  Cervical facet tenderness on palpation catalina   No tender points over trapezium   catalina supraorbital nerve exit point tenderness  catalina occipital nerve exit point tenderness  No auriculotemporal nerve tenderness   Tenderness of levator scapula catalina      Gait - wide based gait   Tremor in hands, legs, voice and neck              Assessment:     1.  Occipital neuralgia     2.  Cervicalgia     3.  4  5  6 Chronic migraine without aura, with intractable migraine, so stated, with status migrainosus (post traumatic) post concussive?  Depression   TMJ - R sided   Insomnia - SHIRA      Head CT  Findings: There is no evidence of acute or recent major vascular territory cerebral infarction, parenchymal hemorrhage, or intra-axial mass.  There are no extra-axial masses or abnormal fluid collections.  The ventricular system is within normal limits of size for age and shows no distortion by mass-effect or evidence of hydrocephalus.  There is no fracture or destructive osseous lesion.  The extracranial soft tissues are unremarkable.  The visualized paranasal sinuses and mastoid air cells are clear.   Impression    No acute intracranial abnormality.  ______________________________________     Electronically signed by resident: KRISSY MAYERS MD  Date: 03/14/16  Time: 17:09            Results for PUSHPA KEY (MRN [id number]) as of 9/3/2015 14:26     Ref. Range  7/20/2015 11:06   8/19/2015 14:15    Vitamin B-12  Latest Range: 210-950 pg/mL  383       Retinol, serum  Latest Range:  ug/dL     52    Vitamin B2   Latest Range: 1-19 mcg/L      2     Vitamin B6   Latest Range: 5-50 ug/L      4 (L)     Vit D, 25-Hydroxy   Latest Range: 30-96 ng/mL   13 (L)           Plan:     1. Prophylactic medication -   Despiramine 25 mg AM (for dizziness)   Cymbalta 120 mg daily   Aricept 20 mg daily   Neurontin 900 mg TID    Magnesium 200 mg daily  Zanaflex 8 mg PRN   Topamax 200 mg BID   Cont B2, B6 supplements   2, Breakthrough headache - zanaflex 8 mg, Gabitril 8 mg  PRN percocet Q=30  Multiple alternative treatment options were offered to the patient   3. Refrain from over counter pain medications. Discussed medication overuse headache.cont Magnesium 400 mg PO BID   4. Occipital neuralgia - If medical management is ineffective may consider occipital nerve blocks.   5. I again urged the patient to keep a headache calender.    f/up Dr. Rock for TBI    B12 injection - 5/25/17   f/up sleep clinic - CPAP pending      Cont AIMOVIG (sept 1rst week)   Consider Emgality      BOTOX was performed as an indicated therapy for intractable chronic migraine headaches given that the patient failed > 5 prophylactic medications  Botulinum Toxin Injection Procedure   Pre-operative diagnosis: Chronic migraine   Post-operative diagnosis: Same   Procedure: Chemical neurolysis   After risks and benefits were explained including bleeding, infection, worsening of pain, damage to the areas being injected, weakness of muscles, loss of muscle control, dysphagia if injecting the head or neck, facial droop if injecting the facial area, painful injection, allergic or other reaction to the medications being injected, and the failure of the procedure to help the problem, a signed consent was obtained.   The patient was placed in a comfortable area and the sites to be treated were identified.The area to be treated was prepped three  times with alcohol and the alcohol allowed to dry. Next, a 30 gauge needle was used to inject the medication in the area to be treated.   Area(s) injected:   Total Botox used: 165 Units   Botox wastage: 35 Units   Injection sites:    muscle bilaterally ( a total of 10 units divided into 2 sites)   Procerus muscle (5 units)   Frontalis muscle bilaterally (a total of 20 units divided into 4 sites)   Temporalis muscle bilaterally (a total of 40 units divided into 8 sites)   Occipitalis muscle bilaterally (a total of 30 units divided into 6 sites)   Cervical paraspinal muscles (a total of 20 units divided into 4 sites)   Trapezius muscle bilaterally (a total of 30 units divided into 6 sites)   Masseter 5 units [place]     Complications: none       RTC for the next Botox injection: 10 weeks

## 2018-10-20 RX ORDER — MELOXICAM 15 MG/1
TABLET ORAL
Qty: 90 TABLET | Refills: 3 | Status: SHIPPED | OUTPATIENT
Start: 2018-10-20 | End: 2018-10-25

## 2018-10-25 ENCOUNTER — OFFICE VISIT (OUTPATIENT)
Dept: INTERNAL MEDICINE | Facility: CLINIC | Age: 53
End: 2018-10-25
Payer: MEDICARE

## 2018-10-25 ENCOUNTER — LAB VISIT (OUTPATIENT)
Dept: LAB | Facility: HOSPITAL | Age: 53
End: 2018-10-25
Attending: INTERNAL MEDICINE
Payer: MEDICARE

## 2018-10-25 VITALS
BODY MASS INDEX: 48.96 KG/M2 | HEIGHT: 64 IN | HEART RATE: 77 BPM | OXYGEN SATURATION: 98 % | DIASTOLIC BLOOD PRESSURE: 80 MMHG | WEIGHT: 286.81 LBS | SYSTOLIC BLOOD PRESSURE: 132 MMHG

## 2018-10-25 DIAGNOSIS — D64.9 ANEMIA, UNSPECIFIED TYPE: ICD-10-CM

## 2018-10-25 DIAGNOSIS — J00 COMMON COLD: ICD-10-CM

## 2018-10-25 DIAGNOSIS — I10 ESSENTIAL HYPERTENSION: Primary | Chronic | ICD-10-CM

## 2018-10-25 DIAGNOSIS — I42.8 NONISCHEMIC CARDIOMYOPATHY: ICD-10-CM

## 2018-10-25 DIAGNOSIS — S06.9X0S COGNITIVE DEFICIT AS LATE EFFECT OF TRAUMATIC BRAIN INJURY: ICD-10-CM

## 2018-10-25 DIAGNOSIS — E04.1 THYROID NODULE: ICD-10-CM

## 2018-10-25 DIAGNOSIS — Z12.11 SCREEN FOR COLON CANCER: ICD-10-CM

## 2018-10-25 DIAGNOSIS — Z86.73 HISTORY OF TIA (TRANSIENT ISCHEMIC ATTACK): ICD-10-CM

## 2018-10-25 DIAGNOSIS — Z86.74 HISTORY OF SUDDEN CARDIAC ARREST: ICD-10-CM

## 2018-10-25 DIAGNOSIS — Z78.9 IMPAIRED MOBILITY AND ADLS: Chronic | ICD-10-CM

## 2018-10-25 DIAGNOSIS — Z79.01 LONG TERM (CURRENT) USE OF ANTICOAGULANTS: ICD-10-CM

## 2018-10-25 DIAGNOSIS — F41.9 ANXIETY: ICD-10-CM

## 2018-10-25 DIAGNOSIS — F33.1 DEPRESSION, MAJOR, RECURRENT, MODERATE: ICD-10-CM

## 2018-10-25 DIAGNOSIS — I48.0 PAROXYSMAL ATRIAL FIBRILLATION: ICD-10-CM

## 2018-10-25 DIAGNOSIS — Z86.73 HISTORY OF CVA (CEREBROVASCULAR ACCIDENT): ICD-10-CM

## 2018-10-25 DIAGNOSIS — F06.8 COGNITIVE DEFICIT AS LATE EFFECT OF TRAUMATIC BRAIN INJURY: ICD-10-CM

## 2018-10-25 DIAGNOSIS — Z74.09 IMPAIRED MOBILITY AND ADLS: Chronic | ICD-10-CM

## 2018-10-25 DIAGNOSIS — K21.9 GASTROESOPHAGEAL REFLUX DISEASE WITHOUT ESOPHAGITIS: ICD-10-CM

## 2018-10-25 DIAGNOSIS — E78.2 MIXED HYPERLIPIDEMIA: Chronic | ICD-10-CM

## 2018-10-25 DIAGNOSIS — G47.33 OSA (OBSTRUCTIVE SLEEP APNEA): ICD-10-CM

## 2018-10-25 DIAGNOSIS — G43.711 CHRONIC MIGRAINE WITHOUT AURA, WITH INTRACTABLE MIGRAINE, SO STATED, WITH STATUS MIGRAINOSUS: ICD-10-CM

## 2018-10-25 DIAGNOSIS — M54.81 BILATERAL OCCIPITAL NEURALGIA: ICD-10-CM

## 2018-10-25 PROBLEM — I63.9 ACUTE ISCHEMIC STROKE: Status: RESOLVED | Noted: 2018-09-28 | Resolved: 2018-10-25

## 2018-10-25 PROBLEM — G56.03 BILATERAL CARPAL TUNNEL SYNDROME: Status: ACTIVE | Noted: 2017-02-03

## 2018-10-25 LAB
FERRITIN SERPL-MCNC: 97 NG/ML
IRON SERPL-MCNC: 73 UG/DL
SATURATED IRON: 23 %
TOTAL IRON BINDING CAPACITY: 317 UG/DL
TRANSFERRIN SERPL-MCNC: 214 MG/DL

## 2018-10-25 PROCEDURE — 36415 COLL VENOUS BLD VENIPUNCTURE: CPT

## 2018-10-25 PROCEDURE — 82728 ASSAY OF FERRITIN: CPT

## 2018-10-25 PROCEDURE — 83540 ASSAY OF IRON: CPT

## 2018-10-25 PROCEDURE — 99213 OFFICE O/P EST LOW 20 MIN: CPT | Mod: PBBFAC | Performed by: INTERNAL MEDICINE

## 2018-10-25 PROCEDURE — 99999 PR PBB SHADOW E&M-EST. PATIENT-LVL III: CPT | Mod: PBBFAC,,, | Performed by: INTERNAL MEDICINE

## 2018-10-25 PROCEDURE — 99214 OFFICE O/P EST MOD 30 MIN: CPT | Mod: S$PBB,,, | Performed by: INTERNAL MEDICINE

## 2018-10-25 RX ORDER — BENZONATATE 100 MG/1
100 CAPSULE ORAL 3 TIMES DAILY PRN
Qty: 30 CAPSULE | Refills: 0 | Status: SHIPPED | OUTPATIENT
Start: 2018-10-25 | End: 2018-11-04

## 2018-10-25 NOTE — PROGRESS NOTES
INTERNAL MEDICINE INITIAL VISIT NOTE      CHIEF COMPLAINT     Chief Complaint   Patient presents with    Establish Care    Sore Throat       HPI     Amna Chawla is a 53 y.o. AA female who presents with hx CVA and TIA c residual cognitive deficits (most recently c TIA 9/2018 per pt, has mild B weakness from several strokes in the past), carotid a disease, HTN, paroxysmal A fib c LAE, hx sudden cardiac arrest c VF and no ischemic heart disease and preserved EF, intermittent 3rd deg AV block and symptomatic LVEF of 40% in setting of normal PET stress and chronic RV pacing s/p CRT-D, HLD, thyroid nodule s/p FNA 7/2018 c path c/w benign follicular nodule, depression with anxiety, chronic complex h/a c occipital neuralgia followed by Neuro and on mult meds and has also had tx c botox, GERD c hiatal hernia, B carpal tunnel s/p release B, SHIRA on APAP 6-20cm followed in sleep clinic, insomnia, previously followed in resident clinic, here today to establish care.    Regarding cardiac hx, had apparent hx cardiac arrest in 2013, was resuscitated followed by hypothermia protocol and subsequently had dual chamber ICD implanted.  Neg coronary CTA at that time.  Had normal EF at that time but subsequent Echo c EF 40-45% in setting of chronic RV pacing.  Was upgraded to CRT-D 9/2017.  Has been followed by Dr. Avelino Mejia in EP clinic and seen earlier this month.    Today reports cough and L sore throat since Sat.  Thinks she got a cold from her grandson and req something for cough.  Denies fever or sob.    Past Medical History:  Past Medical History:   Diagnosis Date    Anticoagulant long-term use     Anxiety     Asthma     Atrial fibrillation     Brain anoxic injury     Cervicalgia 8/28/2014    CHI (closed head injury) 2/19/2013    Convulsion 5/30/2015    Decreased ROM of left shoulder 4/12/2017    Defibrillator activation 2013    Depression     Heart block     History of sudden cardiac arrest 2/2013    PEA  arrest with subsequent long-QT    Hx of psychiatric care     Hypertension     Left atrial enlargement 4/11/2018    Pacemaker 2013    Paresthesia 11/1/2013    Prolonged Q-T interval on ECG 2/8/2013    Psychiatric problem     Seizures     Sleep difficulties     Stroke     weakness lt side    Therapy     Thyroid disease     Upper airway resistance syndrome 2/21/2017       Past Surgical History:  Past Surgical History:   Procedure Laterality Date    APPENDECTOMY      breast reduction      CARDIAC DEFIBRILLATOR PLACEMENT      CARDIAC DEFIBRILLATOR PLACEMENT      CARPAL TUNNEL RELEASE Right     colon  N/A 6/24/2014    Performed by [facility] at [facility] ENDO (2ND FLR)    egd N/A 6/24/2014    Performed by [facility]o [facility]ante [facility] at [facility] ENDO (2ND FLR)    HERNIA REPAIR      HIATAL HERNIA REPAIR      INSERT / REPLACE / REMOVE PACEMAKER  10/2017    CRT-D upgrade    INSERTION, CARDIAC PACEMAKER, DUAL CHAMBER N/A 2/15/2013    Performed by Humberto Martins MD at [facility] CATH LAB    INSERTION-ICD-BIVENTRICULAR N/A 10/13/2017    Per[facility] by [facility]ia [facility] at [facility] CATH LAB    RELEASE-CARPAL TUNNEL Left 2/3/2017    [facility] OR    RELEASE-CARPAL TUNNEL Right 12/9/2016    Performed by [facility] at [facility] OR    TOTAL REDUCTION MAMMOPLASTY      TUBAL LIGATION      VENOGRAM N/A 10/13/2017    Performed by [facility]ia MD at [facility] CATH LAB       Home Medications:  Prior to Admission medications    Medication Sig Start Date End Date Taking? Authorizing Provider   ARIPiprazole (ABILIFY) 5 MG Tab Take 1 tablet (5 mg total) by mouth every evening. 4/3/18   Kade Huffman III, NP   carvedilol (COREG) 25 MG tablet TAKE 1 TABLET(25 MG) BY MOUTH TWICE DAILY WITH MEALS 8/21/18   Rin Martins MD   clonazePAM (KLONOPIN) 1 MG tablet Take 1 tablet (1 mg total) by mouth daily as needed for Anxiety. 4/3/18   Kade Huffman III, NP   clopidogrel (PLAVIX) 75 mg tablet Take  1 tablet (75 mg total) by mouth once daily. 6/7/18   Perlita Ruth MD   donepezil (ARICEPT) 10 MG tablet Take 1 tablet (10 mg total) by mouth 2 (two) times daily. 7/18/17   Toby Paredes MD   DULoxetine (CYMBALTA) 60 MG capsule Take 1 capsule (60 mg total) by mouth 2 (two) times daily. 4/3/18   Kade Huffman III, NP   erenumab-aooe (AIMOVIG AUTOINJECTOR) 70 mg/mL AtIn Inject 70 mg into the skin every 30 days. 8/2/18   David Cortez MD   gabapentin (NEURONTIN) 300 MG capsule Take 3 capsules (900 mg total) by mouth 3 (three) times daily. 11/30/16   David Cortez MD   hydrOXYzine (ATARAX) 50 MG tablet Take 1 tablet (50 mg total) by mouth nightly as needed. 4/3/18   Kade Huffman III, NP   losartan (COZAAR) 50 MG tablet Take 1 tablet (50 mg total) by mouth once daily. 7/20/18 7/20/19  Avelino Mejia MD   magnesium 200 mg Tab Take 200 mg by mouth once daily.  Patient taking differently: Take 200 mg by mouth every other day.  3/7/18   David Cortez MD   meloxicam (MOBIC) 15 MG tablet TAKE 1 TABLET(15 MG) BY MOUTH EVERY DAY 10/20/18   Sam Allred MD   ondansetron (ZOFRAN-ODT) 4 MG TbDL Take 1 tablet (4 mg total) by mouth every 12 (twelve) hours as needed (nausea). 7/24/18   Sam Allred MD   pantoprazole (PROTONIX) 40 MG tablet Take 1 tablet (40 mg total) by mouth once daily. 7/20/15   MARIO Wood   rivaroxaban (XARELTO) 20 mg Tab Take 1 tablet (20 mg total) by mouth daily with dinner or evening meal. 6/15/18   Avelino Mejia MD   rosuvastatin (CRESTOR) 40 MG Tab Take 1 tablet (40 mg total) by mouth every evening. 8/21/18 8/21/19  Rin Martins MD   tiaGABine (GABITRIL) 4 MG tablet Take 1 tablet (4 mg total) by mouth every evening. 3/7/18   David Cortez MD   zolpidem (AMBIEN CR) 12.5 MG CR tablet TAKE 1 TABLET BY MOUTH NIGHTLY AS NEEDED FOR INSOMNIA 8/27/18   Kade Huffman III, NP       Allergies:  Review of patient's allergies indicates:   Allergen Reactions    Aspirin Hives     Imitrex [sumatriptan] Palpitations    Penicillins Hives and Swelling    Shellfish containing products Anaphylaxis     seafood    Percocet [oxycodone-acetaminophen] Itching     States can take    Reglan [metoclopramide hcl] Other (See Comments)     Parkinsonism        Family History:  Family History   Problem Relation Age of Onset    Hypertension Mother     COPD Unknown     Heart failure Unknown     Glaucoma Maternal Grandmother     Glaucoma Paternal Grandmother        Social History:  Social History     Tobacco Use    Smoking status: Never Smoker    Smokeless tobacco: Never Used   Substance Use Topics    Alcohol use: No    Drug use: No       Review of Systems:  Review of Systems   Constitutional: Negative for appetite change, chills, fatigue, fever and unexpected weight change.   HENT: Negative for congestion, hearing loss and rhinorrhea.    Eyes: Negative for pain and visual disturbance.   Respiratory: Negative for cough, chest tightness, shortness of breath and wheezing.    Cardiovascular: Negative for chest pain, palpitations and leg swelling.   Gastrointestinal: Negative for abdominal distention and abdominal pain.   Endocrine: Negative for polydipsia and polyuria.   Genitourinary: Negative for decreased urine volume, difficulty urinating, dysuria, hematuria and vaginal discharge.   Neurological: Positive for headaches. Negative for weakness and numbness.   Psychiatric/Behavioral: Positive for dysphoric mood and sleep disturbance. Negative for behavioral problems and confusion. The patient is nervous/anxious.        Health Maintenance:   Immunizations:   Influenza 9/2018  TDap 10/2/2018  Pneumovax 9/2018 here per pt  Shingrix rec once back in stock    Cancer Screening:  PAP: 11/2017, has f/u c Dr. Marroquin in Jan.  Mammogram:  10/3/2018 benign  Colonoscopy:  6/2014 unable to assess ascending colon due to excessive looping of the colon and body habitus, rec repeat csc in 3 years, will  "schedule      PHYSICAL EXAM     /80 (BP Location: Left arm, Patient Position: Sitting, BP Method: Large (Manual))   Pulse 77   Ht 5' 4" (1.626 m)   Wt 130.1 kg (286 lb 13.1 oz)   SpO2 98%   BMI 49.23 kg/m²     GEN - A+OX4, NAD   HEENT - PERRL, EOMI, OP clear  Neck - No thyromegaly or cervical LAD. No thyroid masses felt.  CV - RRR, no m/r/g  Chest - CTAB, no wheezing, crackles, or rhonchi  Abd - S/NT/ND/+BS.   Ext - 2+BDP and radial pulses. No C/C/E.  Neuro - 5/5 BUE and BLE strength.  LN - No LAD appreciated.  Skin - Normal color and texture, no rash, no skin lesions.      LABS     Lab Results   Component Value Date    LDLCALC 56.6 (L) 09/28/2018     Lab Results   Component Value Date    WBC 8.59 09/30/2018    HGB 11.8 (L) 09/30/2018    HCT 35.3 (L) 09/30/2018    MCV 90 09/30/2018     09/30/2018     No results found for: IRON, TIBC, FERRITIN, SATURATEDIRO  CMP  Sodium   Date Value Ref Range Status   09/30/2018 140 136 - 145 mmol/L Final     Potassium   Date Value Ref Range Status   09/30/2018 3.8 3.5 - 5.1 mmol/L Final     Chloride   Date Value Ref Range Status   09/30/2018 109 95 - 110 mmol/L Final     CO2   Date Value Ref Range Status   09/30/2018 23 23 - 29 mmol/L Final     Glucose   Date Value Ref Range Status   09/30/2018 119 (H) 70 - 110 mg/dL Final     BUN, Bld   Date Value Ref Range Status   09/30/2018 21 (H) 6 - 20 mg/dL Final     Creatinine   Date Value Ref Range Status   09/30/2018 0.9 0.5 - 1.4 mg/dL Final     Calcium   Date Value Ref Range Status   09/30/2018 8.9 8.7 - 10.5 mg/dL Final     Total Protein   Date Value Ref Range Status   09/30/2018 8.6 (H) 6.0 - 8.4 g/dL Final     Albumin   Date Value Ref Range Status   09/30/2018 2.8 (L) 3.5 - 5.2 g/dL Final     Total Bilirubin   Date Value Ref Range Status   09/30/2018 0.1 0.1 - 1.0 mg/dL Final     Comment:     For infants and newborns, interpretation of results should be based  on gestational age, weight and in agreement with " clinical  observations.  Premature Infant recommended reference ranges:  Up to 24 hours.............<8.0 mg/dL  Up to 48 hours............<12.0 mg/dL  3-5 days..................<15.0 mg/dL  6-29 days.................<15.0 mg/dL       Alkaline Phosphatase   Date Value Ref Range Status   09/30/2018 66 55 - 135 U/L Final     AST   Date Value Ref Range Status   09/30/2018 11 10 - 40 U/L Final     ALT   Date Value Ref Range Status   09/30/2018 17 10 - 44 U/L Final     Anion Gap   Date Value Ref Range Status   09/30/2018 8 8 - 16 mmol/L Final     eGFR if    Date Value Ref Range Status   09/30/2018 >60.0 >60 mL/min/1.73 m^2 Final     eGFR if non    Date Value Ref Range Status   09/30/2018 >60.0 >60 mL/min/1.73 m^2 Final     Comment:     Calculation used to obtain the estimated glomerular filtration  rate (eGFR) is the CKD-EPI equation.        Lab Results   Component Value Date    HGBA1C 5.9 (H) 09/28/2018         ASSESSMENT/PLAN     Amna Chawla is a 53 y.o. female with  Amna was seen today for establish care and sore throat.    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 Mejia, now c CRT-D, cont routine f/u  euvolemic    Mixed hyperlipidemia  LDL 56 on Crestor, cont meds    Bilateral occipital neuralgia  Chronic migraine without aura, with intractable migraine, so stated, with status migrainosus  As per HPI  Has tried several meds s  relief, now awaiting 2nd dose of Aimoving  Cont f/u c Neuro    SHIRA (obstructive sleep apnea)  As per HPI, also c insomnia  Awaiting new machine per pt    Thyroid nodule  S/p bx on L benign, awaiting FNA on R, advised pt to schedule    Gastroesophageal reflux disease without esophagitis  As per HPI, s/p hiatal hernia repair in the past, cont PPI    Depression, major, recurrent, moderate  Anxiety  As above, sees Dr. Huffman/Etta in addition to several other providers in their clinic  Currently on Abilify, Klonopin, Cymbalta, Atarax and ambien  Mgmt as per psych    Anemia, unspecified type  Mild, no fe studies on file  Will check and arrange for repeat csc since overdue  -     Iron and TIBC; Future; Expected date: 10/25/2018  -     Ferritin; Future; Expected date: 10/25/2018    Screen for colon cancer  -     Case request GI: COLONOSCOPY    Common cold c cough  -     benzonatate (TESSALON) 100 MG capsule; Take 1 capsule (100 mg total) by mouth 3 (three) times daily as needed for Cough.      HM as above    RTC in 3 months, sooner if needed and depending on labs.    Tiffany Mina MD  Department of Internal Medicine - [facility]  10/25/2018

## 2018-10-30 ENCOUNTER — TELEPHONE (OUTPATIENT)
Dept: NEUROLOGY | Facility: CLINIC | Age: 53
End: 2018-10-30

## 2018-10-30 DIAGNOSIS — G43.711 CHRONIC MIGRAINE WITHOUT AURA, WITH INTRACTABLE MIGRAINE, SO STATED, WITH STATUS MIGRAINOSUS: ICD-10-CM

## 2018-10-30 NOTE — TELEPHONE ENCOUNTER
----- Message from Julissa Barlow PharmD sent at 10/30/2018  1:32 PM CDT -----  Regarding: AImovig Increase  Good Afternoon,    Upon a refill call today, patient states that Dr. Cortez was going to increase her Aimovig to 140 mg. She stated they went over this at her last appointment.     Please advise.      Thank You,    Julissa Barlow, PharmD  Specialty Pharmacy Clinical Pharmacist  [facility]  P: [phone]

## 2018-10-31 ENCOUNTER — EXTERNAL CHRONIC CARE MANAGEMENT (OUTPATIENT)
Dept: PRIMARY CARE CLINIC | Facility: CLINIC | Age: 53
End: 2018-10-31
Payer: MEDICARE

## 2018-10-31 ENCOUNTER — TELEPHONE (OUTPATIENT)
Dept: ADMINISTRATIVE | Facility: HOSPITAL | Age: 53
End: 2018-10-31

## 2018-10-31 ENCOUNTER — TELEPHONE (OUTPATIENT)
Dept: ENDOSCOPY | Facility: HOSPITAL | Age: 53
End: 2018-10-31

## 2018-10-31 DIAGNOSIS — Z86.73 HISTORY OF CVA (CEREBROVASCULAR ACCIDENT): ICD-10-CM

## 2018-10-31 DIAGNOSIS — Z86.73 HISTORY OF CVA (CEREBROVASCULAR ACCIDENT): Primary | ICD-10-CM

## 2018-10-31 PROCEDURE — 99490 CHRNC CARE MGMT STAFF 1ST 20: CPT | Mod: S$PBB,,, | Performed by: PSYCHIATRY & NEUROLOGY

## 2018-10-31 PROCEDURE — 99490 CHRNC CARE MGMT STAFF 1ST 20: CPT | Mod: PBBFAC | Performed by: PSYCHIATRY & NEUROLOGY

## 2018-10-31 NOTE — TELEPHONE ENCOUNTER
DOCUMENTATION ONLY:  Prior Authorization for Aimovig 140mg approved from 07/04/18 to 08/03/19    Case Id: [id number]    Co-pay: $8.35    Patient Assistance IS NOT required  DEIDRE

## 2018-10-31 NOTE — TELEPHONE ENCOUNTER
"Dr Mina- please see the message below from the [facility] nurse. I am unable to find a rollator order also. I contacted the pt and she confirmed it was discussed at her appt with you last week. Can you please place order & sign? I will fax the forms to the DME company. Kerwin Flores      Pt. stated pt. was to receive a rollater and has not received a call. I am not finding any orders. Pt. PCP is Tiffany Stratton.     Thank you,       Jamil Mckay" Steven [facility]   [phone] Ext [phone]   "

## 2018-10-31 NOTE — TELEPHONE ENCOUNTER
Can Pt hold Plavix x 5 days and Xarelto x 2 days prior to colonoscopy  per endoscopy protocol?   Please advise.     Thank you

## 2018-11-01 ENCOUNTER — TELEPHONE (OUTPATIENT)
Dept: ENDOSCOPY | Facility: HOSPITAL | Age: 53
End: 2018-11-01

## 2018-11-01 DIAGNOSIS — G47.00 INSOMNIA, UNSPECIFIED TYPE: ICD-10-CM

## 2018-11-01 NOTE — TELEPHONE ENCOUNTER
Message   Received: Today   Message Contents   MD Jeanette Palmer RN   Cc: Perlita Ruth MD   Caller: Unspecified (Yesterday, 10:53 AM)             Jesus Reid,   I am following Ms. Chawla for her HIE and cognitive impairments.   She is followed by Dr. Ruth of vascular neurology for stroke prevention.  I have CC'ed her to this email.   kS    Previous Messages            Patient Calls     Missy Rock MD   You; Perlita Ruth MD 7 hours ago (6:56 AM)      Jesus Reid,   I am following Ms. Chawla for her HIE and cognitive impairments.   She is followed by Dr. Ruth of vascular neurology for stroke prevention.  I have CC'ed her to this email.   kS (Routing comment)          You routed conversation to Missy Rock MD 23 hours ago (3:44 PM)      Avelino Mejia MD   You Yesterday (12:25 PM)      I would defer to neurology. I believe she recently had a stroke. Not sure if they would agree with holding them right now. (Routing comment)          You routed conversation to Avelino Mejia MD Yesterday (11:03 AM)      You Yesterday (10:53 AM)         Can Pt hold Plavix x 5 days and Xarelto x 2 days prior to colonoscopy  per endoscopy protocol?   Please advise.     Thank you            Documentation

## 2018-11-02 RX ORDER — ZOLPIDEM TARTRATE 10 MG/1
TABLET ORAL
Qty: 30 TABLET | Refills: 0 | OUTPATIENT
Start: 2018-11-02

## 2018-11-02 NOTE — TELEPHONE ENCOUNTER
This particular order has to be placed as DME, not a script. I believe that's why we can't see it. I pended it for you but please review to make sure it is correct. Thanks.

## 2018-11-05 ENCOUNTER — TELEPHONE (OUTPATIENT)
Dept: PHARMACY | Facility: CLINIC | Age: 53
End: 2018-11-05

## 2018-11-05 ENCOUNTER — TELEPHONE (OUTPATIENT)
Dept: INTERNAL MEDICINE | Facility: CLINIC | Age: 53
End: 2018-11-05

## 2018-11-05 NOTE — TELEPHONE ENCOUNTER
Jarrell rollator rx was sent to [facility]  Faxed to [phone]

## 2018-11-06 DIAGNOSIS — G47.00 INSOMNIA, UNSPECIFIED TYPE: ICD-10-CM

## 2018-11-06 DIAGNOSIS — G43.711 CHRONIC MIGRAINE WITHOUT AURA, WITH INTRACTABLE MIGRAINE, SO STATED, WITH STATUS MIGRAINOSUS: ICD-10-CM

## 2018-11-06 DIAGNOSIS — G47.8 UPPER AIRWAY RESISTANCE SYNDROME: ICD-10-CM

## 2018-11-06 RX ORDER — ZOLPIDEM TARTRATE 12.5 MG/1
TABLET, FILM COATED, EXTENDED RELEASE ORAL
Qty: 30 TABLET | Refills: 0 | Status: SHIPPED | OUTPATIENT
Start: 2018-11-06 | End: 2019-01-02 | Stop reason: SDUPTHER

## 2018-11-06 RX ORDER — ONDANSETRON 4 MG/1
4 TABLET, ORALLY DISINTEGRATING ORAL EVERY 12 HOURS PRN
Qty: 10 TABLET | Refills: 1 | Status: SHIPPED | OUTPATIENT
Start: 2018-11-06 | End: 2019-05-21 | Stop reason: SDUPTHER

## 2018-11-06 NOTE — TELEPHONE ENCOUNTER
----- Message from Raquel Andrade sent at 11/6/2018 12:15 PM CST -----  Contact: [phone]  Type: Rx    Name of medication(s): ondansetron (ZOFRAN-ODT) 4 MG TbDL      Is this a refill? New rx?    Who prescribed medication? Keeley    Pharmacy Name, Phone, & Location:  [id number] - [place], [place] - [address] AT [facility] [address]    Comments:  Please advise, thank you

## 2018-11-07 ENCOUNTER — DOCUMENTATION ONLY (OUTPATIENT)
Dept: CARDIOLOGY | Facility: HOSPITAL | Age: 53
End: 2018-11-07

## 2018-11-07 ENCOUNTER — TELEPHONE (OUTPATIENT)
Dept: INTERNAL MEDICINE | Facility: CLINIC | Age: 53
End: 2018-11-07

## 2018-11-07 DIAGNOSIS — Z95.810 ICD (IMPLANTABLE CARDIOVERTER-DEFIBRILLATOR) IN PLACE: Primary | ICD-10-CM

## 2018-11-07 DIAGNOSIS — I46.9 CARDIAC ARREST: ICD-10-CM

## 2018-11-07 DIAGNOSIS — Z86.73 HISTORY OF CVA (CEREBROVASCULAR ACCIDENT): Primary | ICD-10-CM

## 2018-11-07 NOTE — TELEPHONE ENCOUNTER
----- Message from José Valladares sent at 11/7/2018  9:18 AM CST -----  Contact: self/[phone]  Pt is calling to speak with someone in the office in regards to getting a call back about the walker that was called in for her. Pt states that the walker is too small for her and she needs a bigger one. Pt states that the orders have to say that she's over 300 lbs so that she can get a bigger one. Please advise.            Thanks

## 2018-11-07 NOTE — TELEPHONE ENCOUNTER
Spoke with pt and she needs a new order for a rollator sent to [employer]( [phone]) for a 300lb plus walker because the one they gave her is to small and her thighs rub. Pt weight 286 at last appointment. Please advise

## 2018-11-07 NOTE — PROGRESS NOTES
[employer] ICD alert (interim) transmission received noting RV sensing amplitude below margin (<2.0mV).  Pt has hx of CHB, R waves measured in the clinic in August were 7-8mV avg.  Discussed with Dr. Mejia, repeat R wave amplitude in clinic when patient at main campus next.  Low sensing measurement may represent PVC or some other sensed event that is not a true R wave.

## 2018-11-08 NOTE — TELEPHONE ENCOUNTER
Dr. Ruth,     If Pt is to be on Lovenox while holding Eliquis x 2 days, this will need to be ordered and Pt to be instructed by neurology, your office.     If Pt is to be bridged on lovenox, if ordered once daily, Pt to inject half the daily dose 24 hrs prior to procedure, if ordered twice daily morning dose only to be injected the day before the procedure per endoscopy protocol.     Please advise    Thank you

## 2018-11-12 ENCOUNTER — CLINICAL SUPPORT (OUTPATIENT)
Dept: CARDIOLOGY | Facility: HOSPITAL | Age: 53
End: 2018-11-12
Attending: INTERNAL MEDICINE
Payer: MEDICARE

## 2018-11-12 DIAGNOSIS — G45.9 TIA (TRANSIENT ISCHEMIC ATTACK): ICD-10-CM

## 2018-11-12 DIAGNOSIS — G43.711 CHRONIC MIGRAINE WITHOUT AURA, WITH INTRACTABLE MIGRAINE, SO STATED, WITH STATUS MIGRAINOSUS: ICD-10-CM

## 2018-11-12 DIAGNOSIS — F33.1 DEPRESSION, MAJOR, RECURRENT, MODERATE: ICD-10-CM

## 2018-11-12 DIAGNOSIS — Z86.74 HISTORY OF SUDDEN CARDIAC ARREST: ICD-10-CM

## 2018-11-12 DIAGNOSIS — I44.2 COMPLETE AV BLOCK: ICD-10-CM

## 2018-11-12 DIAGNOSIS — I45.81 PROLONGED QT SYNDROME: ICD-10-CM

## 2018-11-12 DIAGNOSIS — Z95.810 AICD (AUTOMATIC CARDIOVERTER/DEFIBRILLATOR) PRESENT: ICD-10-CM

## 2018-11-12 PROCEDURE — 93296 REM INTERROG EVL PM/IDS: CPT

## 2018-11-12 RX ORDER — ARIPIPRAZOLE 5 MG/1
TABLET ORAL
Qty: 30 TABLET | Refills: 0 | Status: SHIPPED | OUTPATIENT
Start: 2018-11-12 | End: 2019-01-07

## 2018-11-14 NOTE — TELEPHONE ENCOUNTER
Thank you Dr. Ruth for responding. Per endoscopy protocol, if Pt is to be bridged with Lovenox, Lovenox is to be ordered by physician who is managing Pt blood thinning medication and responsible for Lovenox teaching/instructions. Lovenox is not ordered by Endosocpy nurse or scoping MD per endoscopy protocol.     Please advise so Pt can be scheduled for colonoscopy.     Thank you

## 2018-11-15 ENCOUNTER — TELEPHONE (OUTPATIENT)
Dept: INTERNAL MEDICINE | Facility: CLINIC | Age: 53
End: 2018-11-15

## 2018-11-15 VITALS
OXYGEN SATURATION: 96 % | HEART RATE: 76 BPM | SYSTOLIC BLOOD PRESSURE: 142 MMHG | RESPIRATION RATE: 20 BRPM | DIASTOLIC BLOOD PRESSURE: 92 MMHG

## 2018-11-15 NOTE — TELEPHONE ENCOUNTER
----- Message from José Valladares sent at 11/15/2018  1:59 PM CST -----  Contact: self/ [phone]  Pt is calling to speak with someone in the office in regards to the Rolator walker. Pt states that she spoke with [facility] and they sent her to [facility] and the DME told her that they did not have the updated order. Pt is confused and needs guidance. Please advise.        Thanks

## 2018-11-15 NOTE — TELEPHONE ENCOUNTER
----- Message from Raquel Andrade sent at 11/15/2018 12:16 PM CST -----  Contact: [phone]  Patient is requesting a call from the nurse in regards to the Rollator walker she has.     Patient stated the seat is to small and she need a bigger one.  Patient stated she called last week about this matter.    Patient no one has come to pick it up.    Please advise, thank you

## 2018-11-15 NOTE — PROGRESS NOTES
AAOx3.  Lives adult children who were not present for visit.  NIHSS-2; does not smoke; refrains from adding salt, salty and fast food; walks for exercise; compliant w/ all meds.  Reports stroke event 10/5/18 for which she was hospitalized. Slight lt facial droop and LLE ataxia noted.  Education provided on goal of stroke mobile, s/s of stroke, diet, exercise, medications.  Verbalized understanding of all instructions provided.  Encouraged to utilize stroke team for any concern.

## 2018-11-15 NOTE — TELEPHONE ENCOUNTER
Spoke with pt and told her I refaxed the new orders to the dme company. Pt will check with them tomorrow

## 2018-11-19 ENCOUNTER — TELEPHONE (OUTPATIENT)
Dept: INTERNAL MEDICINE | Facility: CLINIC | Age: 53
End: 2018-11-19

## 2018-11-19 NOTE — TELEPHONE ENCOUNTER
----- Message from Avani Marquis sent at 11/19/2018  8:09 AM CST -----  Contact: self/[phone]  .1 Patient would like to get medical advice.  Symptoms (please be specific): running nose, weak body, soar throat  How long has patient had these symptoms: 11/17/18  Pharmacy name and phone#: [facility] [id number] - [place], [place] - [address] AT [facility] [phone] (Phone)  [phone] (Fax)  Any drug allergies: onfile  Comments: Patient would like to get medical advice.

## 2018-11-19 NOTE — TELEPHONE ENCOUNTER
Pt has a cold . Pt informed its viral and may last for a few days but if sx get worse she needs to be seen. Pt informed to try otc mucinex .

## 2018-11-20 ENCOUNTER — TELEPHONE (OUTPATIENT)
Dept: NEUROLOGY | Facility: CLINIC | Age: 53
End: 2018-11-20

## 2018-11-29 ENCOUNTER — TELEPHONE (OUTPATIENT)
Dept: PHARMACY | Facility: CLINIC | Age: 53
End: 2018-11-29

## 2018-11-30 ENCOUNTER — EXTERNAL CHRONIC CARE MANAGEMENT (OUTPATIENT)
Dept: PRIMARY CARE CLINIC | Facility: CLINIC | Age: 53
End: 2018-11-30
Payer: MEDICARE

## 2018-11-30 PROCEDURE — 99490 CHRNC CARE MGMT STAFF 1ST 20: CPT | Mod: S$PBB,,, | Performed by: PSYCHIATRY & NEUROLOGY

## 2018-11-30 PROCEDURE — 99490 CHRNC CARE MGMT STAFF 1ST 20: CPT | Mod: PBBFAC | Performed by: PSYCHIATRY & NEUROLOGY

## 2018-12-17 DIAGNOSIS — G43.711 CHRONIC MIGRAINE WITHOUT AURA, WITH INTRACTABLE MIGRAINE, SO STATED, WITH STATUS MIGRAINOSUS: Primary | ICD-10-CM

## 2018-12-28 ENCOUNTER — PROCEDURE VISIT (OUTPATIENT)
Dept: NEUROLOGY | Facility: CLINIC | Age: 53
End: 2018-12-28
Payer: MEDICARE

## 2018-12-28 ENCOUNTER — TELEPHONE (OUTPATIENT)
Dept: NEUROLOGY | Facility: CLINIC | Age: 53
End: 2018-12-28

## 2018-12-28 VITALS
WEIGHT: 288.56 LBS | HEART RATE: 60 BPM | HEIGHT: 64 IN | BODY MASS INDEX: 49.26 KG/M2 | DIASTOLIC BLOOD PRESSURE: 75 MMHG | SYSTOLIC BLOOD PRESSURE: 154 MMHG

## 2018-12-28 DIAGNOSIS — G43.711 CHRONIC MIGRAINE WITHOUT AURA, WITH INTRACTABLE MIGRAINE, SO STATED, WITH STATUS MIGRAINOSUS: Primary | ICD-10-CM

## 2018-12-28 DIAGNOSIS — M54.81 BILATERAL OCCIPITAL NEURALGIA: ICD-10-CM

## 2018-12-28 PROCEDURE — 64615 CHEMODENERV MUSC MIGRAINE: CPT | Mod: S$PBB,,, | Performed by: PSYCHIATRY & NEUROLOGY

## 2018-12-28 PROCEDURE — 64615 CHEMODENERV MUSC MIGRAINE: CPT | Mod: PBBFAC | Performed by: PSYCHIATRY & NEUROLOGY

## 2018-12-28 RX ORDER — CYANOCOBALAMIN 1000 UG/ML
1000 INJECTION, SOLUTION INTRAMUSCULAR; SUBCUTANEOUS ONCE
Status: DISCONTINUED | OUTPATIENT
Start: 2018-12-28 | End: 2020-09-14 | Stop reason: CLARIF

## 2018-12-28 RX ADMIN — ONABOTULINUMTOXINA 200 UNITS: 100 INJECTION, POWDER, LYOPHILIZED, FOR SOLUTION INTRADERMAL; INTRAMUSCULAR at 11:12

## 2018-12-28 NOTE — TELEPHONE ENCOUNTER
Patient seen in clinic.Vitamin B 12 1,000 mcg given in right deltoid per order of Dr Cortez.2 Patient identifiers and allergies reviewed.Site clear and band aid applied.Patient waited with Nursing present for 15 minutes.No untoward reaction noted.

## 2018-12-28 NOTE — PROCEDURES
Follow up:   HA started 12/24   She feels BOTOX wore off last week   Reports GI distress from taking to many motrin     Prior note:   4/week HA - L vertex   Jabs - vertex either sides      She reports migraine that woke her up in the morning - associated with L side facial droop      Prior note:   She gets acceptable relief for 2.5 months after BOTOX      Prior note:   No recurrent stroke symptoms   starting having whole body tremors since this AM. Took 1 tablet of lorazepam   Last night had a HA, took trazodone       Admit Date: 4/11/2018  2:03 PM   Discharge Date and Time: 4/12/2018  8:30 PM   History of Present Illness: Ms. Chawla is a 53 yo F with afib on Eliquis (last dose this am), prior cardiac arrest with associated acute ischemic stroke with residual mild L sided weakness, CAD with ICD in situ, HTN, and HLD who presented with acute R sided weakness and sensation changes.  She originally called EMS for palpitations, but developed acute right sided facial droop and RUE weakness at 1:40pm today, after EMS had arrived.  She denies changes to speech or vision.  SBP en route 200/100.  She is ineligible for tPA 2/2 Eliquis use.  She is not suspected to have an LVO.  She will be admitted to  for observation overnight.     Hospital Course (synopsis of major diagnoses, care, treatment, and services provided during the course of the hospital stay): Ms. Chawla was admitted for acute stroke workup. Pt with pacemaker so unable to obtain MRI Brain; CTA H/N demonstrated no evidence acute infarction, though did exhibit noncalcified plaquing of carotid bifurcations and proximal ICAs with < 50% stenosis, so Plavix was added to pt's daily home regimen. Concerned for TIA at this time though Migraine very high on differential due to pt's hx headaches, including presently this admission. Hypertensive urgency/emergency also considered due to pt's very elevated levels with EMS. Per chart review, Eliquis was a new medication  since 4/3/18 (had switched from Coumadin), however staff Faraz concerned that pt had a potential event while on Eliquis. She wished to switch to Pradaxa as an alternative DOAC (as it has an option for reversal), however changed to Xarelto per pt's insurance coverage.   While admitted, pt given cocktail for HA which she has received previously at the infusion clinic - IV Magnesium, IM Toradol, 2mg IV Morphine, 500cc NS bolus (IV Benadryl not included this time as pt had received a dose earlier that day prior to contrast imaging.) HA improved somewhat and pt was encouraged to follow up with Dr. Cortez for continued management of botox injections, etc. At that time, pt also found with hyponatremia; d/c'd Clorthalidone and plan was made for pt to [facility] 4[facility]16[facility]18 for repeat CMP to evaluate Na level and BP check since discontinuing this medication.  Ms. Chawla was evaluated and treated by PT, OT, and SLP who recommend d/c with outpatient PT and OT. She was discharged home 4/12/18 with [facility] follow-up Monday      Prior note:   2 weeks ago BOTOX effect wore off   Used up all her percocet   3 wks h/o:   Reports frequent falls - falling forward, no LOC, no injuries, able to protect falls by hands   triggers - unknown   Also occ stumbling   Dragging L foot   No Pain in back or legs   No numbness or weakness in legs   No B/B incontinence   No lightheadedness      Prior note:    Flare up of TMJ and HA during daughter's wedding   NSAIDS helped   2 weeks ago BOTOX effect wore off      Prior note:   2 weeks ago BOTOX effect wore off   Has severe spasm and pain in the traps catalina   Weather change has provoked severe migraine + nausea   She started coumadin       Prior note:   3 weeks ago BOTOX effect wore off   She reports severe daily HA    During BOTOX periods she feels she  her HA. Much less intense      Prior note:   The patient is a 51-year-old female who [facility]. Since her last visit, she reports growing frustration   about the fact that nothing has helped her with regards to her headaches. She   continues to experience daily headaches. She takes mefenamic acid and   tizanidine every night, which only provides her two hours of relief. She is   unable to sleep because of the refractory headaches. She is incapacitated   because of severe headaches. She reports minimal relief with infusions that she  gets on a periodic basis. She does report an improvement overall with the   Botox. However, this effect has worn off in the last two weeks. She also   reports an episode wherein she fell with loss of consciousness, which was not   provoked. As a result, she injured her ankle and is currently wearing a boot.      Prior note:   since last week - Reports vertigo for 6 - 7 minutes upto 3 times daily   Nearly daily severe HA - no response to ponstel , compazine   She is late for her BOTOX - last 2 weeks were painful       Follow up:   R sided Headache is constant max at TMJ on R   Prior note:   She reports new episodes of R face tingling. Sh also reports 2 episodes of blurred vision lasting 15 minutes. These hapended while watching TV. Her pain remains unchanged   Follow up:   2 days of severe HA during Thanksgiving   Pounding bifrontal region   Pain worse over L eye brow   Follow up:   Reports bifrontal severe HA (worse on L) with no nausea with sudden onset . Occurs daily   NO note:  I found no papilledema or other changes to suggest elevated intracranial pressure or other alternative etiology for her headaches. Repeat exam in two years.  HA are less frequent and less intense.   (R levator scapula spasm)   symptoms better with lateral flexion to L   Prior note:   She still has daily HA with severe sharp stabbing pain behind L eye lasting for 15 sec   Prior note:   Daily pain. 2/week - nausea.  Shu Lares RN at 9/17/2014 4:53 PM  Pt  "presented to clinic for medical advice. Pt is seen by Dr. Paredes and Dr. Cortez.  1) She went ER on 9/13/14 for a migraine. She was given Reglan, Benadryl, MSO4 and IVF. A couple days later she developed an all over body "tremor". She states "I think I have Parkinson's". - Per Dr. Paredes, medication related tremor (Reglan & Benadryl). Informed her it should wear off in a few days. If not, she is to call and let us know.  2) Headaches - triggered by insomnia.   Current meds:  Ketoprofen - she took it once and said her "throat closed up" and she's not supposed to have anything with aspirin in it.  Nortriptyline - she was taking it at night, but it didn't help her sleep, so she stopped it.  Per Dr. Cortez, discontinue Ketoprofen and Nortriptyline. Start Effexor XR 37.5mg QD, tizanidine 4mg BID PRN headache and Klonopin 0.25 - 0.5mg QHS PRN. Will schedule pt for sphenocath procedure within the next week.   Prior note:   48 yo woman with history of HTN and asthma, both reportedly controlled, in hospital early 2013 after "passed out" and became unresponsive. CPR in the field for 8 minutes until EMS arrived. Per ED note, on arrival she was found to be in PEA, given epinephrine. Vfib/Vtach was achieved which was shocked x1. Patient converted to sinus tachycardia after shock. I do not know the time course for the above treatment. On arrival to facility patient was in sinus tachycardia with strong pulses, intubated and unresponsive. ET tube placement was confirmed with direct laryngoscopy and good bilateral breath sounds were heard after the tube was pulled back 2 cm. Reported to have "withdrawal" movements in ER during stabilization, then sedated and cooling protocol initiated. Had remarkable   recovery and first seen in clinic in April 2013.   Headache history: cluster   Age of onset - 2012   Location - behind L eye   Nature of pain - sharp   Prodrome - no   Aura - No   Duration of headache - 6-7 min   Time to peak " "intensity -   Pain scale - range of intensity - 9/10   Frequency - daily   Status Migrainosus history - 1-3 days   Time of day of most headaches- anytime   Associated symptoms with the headache:   Red eyes   swelling of L face   Headache history: Migraine   Age of onset -    Location - bifrontal   Nature of pain - Pounding   Prodrome - no   Aura - No   Duration of headache - > 4 hrs   Time to peak intensity -   Pain scale - range of intensity - 9/10   Frequency - 5/week   Status Migrainosus history - 1-3 days   Time of day of most headaches- anytime   Associated symptoms with the headache:   Meningeal symptoms - photophobia, phonophobia, exercise intolerance +   Nausea/vomitting +   Nasal drainage   Visual blurriness +   Pallor/flushing   Dizziness   Vertigo   Confusion   Impaired concentration +   Pain worsened with physical activity +   Neck pain +   Symptoms of increased intracranial pressure:   Whooshing sounds - no   Visual spots/blotches - no   Headache Triggers: unknown   Other comorbid conditions:   Anxiety - no   Motion sickness symptom - no       Treatment history:   Resolution of headache with sleep - yes       Meds tried:   Trigger points involvin/9/15 helped for 1 day   Site: Right   Levator scapula   Pharmacological agent:   0.5% Sensorcaine solution   No complications were noted.  Supraorbital block - helped for 2 days   Tylenol - not help   imitrex - chest tightness   alleve - help   topamax - not helping   Neurontin - not helping   Paxil, Elavil - not help   seroquel - nightmare   Ketoprofen - she took it once and said her "throat closed up" and she's not supposed to have anything with aspirin in it.  Nortriptyline - she was taking it at night, but it didn't help her sleep, so she stopped it.  reglan - parkinsonism   zanaflex - help   Toradol 30 mg IM inj at home - too expensive   BOTOX round #1 9/3/15 - helped (R levator scapula spasm)   Round #2 12/3/15 - helped   Round#3 3/316 - helped "   Round #4 6/1/16 - helped   BOTOX#5 9/15/16 - helped     BOTOX#6 - 11/30/16 - helped   BOTOX#7 - 3/9/17 - helped    BOTOX#8 - 5/25/17 - helped    BOTOX#9 8/10/17 - helped   BOTOX#10 10/19/17 - helped   BOTOX#11 12/27/17 - helped   BOTOX#12 3/7/18 - helped   BOTOX#13 5/16/18 - helped    BOTOX#14 8/1/18 - helped     BOTOX#15 10/19/18 - helped     Can not do RFA - pt has pacemaker   Procedure: IOVERA peripheral nerve focus cold therapy (non ablative cryotherapy) 12/30/15 - not help   Indication: Cryotherapy of select peripheral nerves of the head was performed to treat chronic headaches that failed to respond to several preventive pharmacological therapies.   Sedation: None   Informed consent: The procedure, risks, benefits and options were discussed with the patient. There are no contraindications to the procedure. The patient expressed understanding and agreed to the procedure. I verify that I personally obtained the patient's consent prior to the start of the procedure.  Location:  Bilateral Supra orbital nerve (3 cycles on R and 1 cycle on L)   Bilateral Occipital nerve   3 cycles   Pain relief: Pain score reduced 10/10->0/10   9/26 Bilateral Sphenopalatine Ganglion and bilateral Maxillary Branch (Trigeminal Nerve) Block - no help   ONB - not help                                                               Shannon Pagan RN at 12/31/2014 3:54 PM         Status: Signed                     Expand All Collapse All   HEADACHE FASTTRACK  PAIN 7/10 NAUSEA 0/10  ROUND 1: TORADOL, IMITREX, MORPHINE, BENADRYL, AND MAGNESIUM  PAIN 7/10 NAUSEA 0/10  PT STATED SHE WAS READY TO GO HOME AND GO TO SLEEP. PT D/C'ED IN [facility]             Shannon Pagan RN at 10/5/2015 12:49 PM         Status: Signed                     Expand All Collapse All   HEADACHE FASTTRACK  PAIN 8/10 NAUSEA 10/10  FIRST ROUND: tORADOL, BENADRYL, COMPAZINE, IMITREX, MAGNESIUM, AND VALPROATE.  PAIN 1/10 NAUSEA 0/10  PT READY TO GO HOME AND FEELING BETTER. PT  LEFT IN NAD WITH FAMILY MEMBER  TOTAL TIME: 0275-7978        Shannon Pagan RN at 10/20/2015 3:05 PM         Status: Signed                     Expand All Collapse All   HEADACHE FASTTRACK  PAIN 10/10 NAUSEA 0/10  FIRST ROUND: tORADOL, BENADRYL, COMPAZINE, IMITREX, MAGNESIUM, AND VALPROATE.  BP BEING MONITORED EVERY 15 MIN AND REMAINED 170'S/80-90'S. DR CHOPRA NOTIFIED AND STATED TO MAKE SURE BP DID NOT EXCEED 180 SYSTOLIC OR PT SHOULD REPORT TO ED. BP AT 1500 183/92. DR CHOPRA NOTIFIED AND GAVE ORDER TO STOP INFUSION AND SEND PATIENT TO ED. PT BROUGHT TO ED BY INFUSION NURSE VIA WHEELCHAIR.   PAIN 8/10 NAUSEA 0/10  TOTAL TIME: 2616-4427                  Progress Notes                             Shannon Pagan RN at 2/24/2016 1:36 PM       Status: Signed                  Expand All Collapse All   HEADACHE FASTTRACK  PAIN 10/10 NAUSEA 7/10  FIRST ROUND: tORADOL, BENADRYL, AND MORPHINE-BP RANGING FROM 177-203/84-92, HR 60-82  MD NOTIFIED AND GAVE ORDERS TO SEND TO ED. PT LEFT VIA W/C WITH INFUSION NURSE ON WAY TO ED.            Headache risk factors:   H/o TBI - [facility] (2013) + neck sprain   H/o Meningitis - no   F/h Aneurysms - no       Review of Systems   Constitutional: Negative.   HENT: Negative.   Eyes: Negative.   Respiratory: Negative.   Cardiovascular: Negative.   Gastrointestinal: Negative.   Endocrine: Negative.   Genitourinary: Negative.   Musculoskeletal: Negative.   Skin: Negative.   Allergic/Immunologic: Negative.   Hematological: Negative.   Psychiatric/Behavioral: insomnia      Objective:     Physical Exam   Constitutional: She is oriented to person, place, and time. She appears well-developed.   obesity   HENT:   Head: Normocephalic and atraumatic.   Right Ear: External ear normal.   Left Ear: External ear normal.   Nose: Nose normal.   Mouth/Throat: Oropharynx is clear and moist.   Eyes: Conjunctivae and EOM are normal. Pupils are equal, round, and reactive to light.   Neck: Normal range of motion.    Cardiovascular: Regular rhythm.   Pulmonary/Chest: Effort normal and breath sounds normal.   Musculoskeletal: Normal range of motion.   Neurological: She is alert and oriented to person, place, and time. She has normal reflexes. She displays normal reflexes. No cranial nerve deficit. She exhibits normal muscle tone. Coordination normal. Uses cane.   Ataxic gait - wide based   Skin: Skin is warm and dry.   Psychiatric: She has a normal mood and flat affect. Her behavior is normal. Judgment and thought content normal.   Headache Exam:  Optic disc is flat OU   Temples appear symmetric  No V1,V2,V3 distribution allodynia/hyperalgesia catalina   No facial swelling  No gross TMJ abnormalities   No ptosis   No conj injection   No meningismus   No neck stiffness  No C spine tenderness  Cervical facet tenderness on palpation catalina   No tender points over trapezium   catalina supraorbital nerve exit point tenderness  catalina occipital nerve exit point tenderness  No auriculotemporal nerve tenderness   Tenderness of levator scapula catalina      Gait - wide based gait   Tremor in hands, legs, voice and neck              Assessment:     1.  Occipital neuralgia     2.  Cervicalgia     3.  4  5  6 Chronic migraine without aura, with intractable migraine, so stated, with status migrainosus (post traumatic) post concussive?  Depression   TMJ - R sided   Insomnia - SHIRA      Head CT  Findings: There is no evidence of acute or recent major vascular territory cerebral infarction, parenchymal hemorrhage, or intra-axial mass.  There are no extra-axial masses or abnormal fluid collections.  The ventricular system is within normal limits of size for age and shows no distortion by mass-effect or evidence of hydrocephalus.  There is no fracture or destructive osseous lesion.  The extracranial soft tissues are unremarkable.  The visualized paranasal sinuses and mastoid air cells are clear.   Impression    No acute intracranial  abnormality.  ______________________________________     Electronically signed by resident: KRISSY MAYERS MD  Date: 03/14/16  Time: 17:09            Results for PUSHPA KEY (MRN [id number]) as of 9/3/2015 14:26     Ref. Range  7/20/2015 11:06  8/19/2015 14:15    Vitamin B-12  Latest Range: 210-950 pg/mL  383       Retinol, serum  Latest Range:  ug/dL     52    Vitamin B2   Latest Range: 1-19 mcg/L      2     Vitamin B6   Latest Range: 5-50 ug/L      4 (L)     Vit D, 25-Hydroxy   Latest Range: 30-96 ng/mL   13 (L)           Plan:     1. Prophylactic medication -   Despiramine 25 mg AM (for dizziness)   Cymbalta 120 mg daily   Aricept 20 mg daily   Neurontin 900 mg TID    Magnesium 200 mg daily  Zanaflex 8 mg PRN   Topamax 200 mg BID   Cont B2, B6 supplements   2, Breakthrough headache - zanaflex 8 mg, Gabitril 8 mg  PRN percocet Q=30  Multiple alternative treatment options were offered to the patient   3. Refrain from over counter pain medications. Discussed medication overuse headache.cont Magnesium 400 mg PO BID   4. Occipital neuralgia - If medical management is ineffective may consider occipital nerve blocks.   5. I again urged the patient to keep a headache calender.    f/up Dr. Rock for TBI    B12 injection - 5/25/17, 12/28/18   f/up sleep clinic - CPAP pending      Cont AIMOVIG (sept 1rst week, Oct first week, Nov first wk 140 mg, Dec first wk 140 mg) no benefit   Consider Emgality      BOTOX was performed as an indicated therapy for intractable chronic migraine headaches given that the patient failed > 5 prophylactic medications  Botulinum Toxin Injection Procedure   Pre-operative diagnosis: Chronic migraine   Post-operative diagnosis: Same   Procedure: Chemical neurolysis   After risks and benefits were explained including bleeding, infection, worsening of pain, damage to the areas being injected, weakness of muscles, loss of muscle control, dysphagia if injecting the head or neck, facial droop if  injecting the facial area, painful injection, allergic or other reaction to the medications being injected, and the failure of the procedure to help the problem, a signed consent was obtained.   The patient was placed in a comfortable area and the sites to be treated were identified.The area to be treated was prepped three times with alcohol and the alcohol allowed to dry. Next, a 30 gauge needle was used to inject the medication in the area to be treated.   Area(s) injected:   Total Botox used: 165 Units   Botox wastage: 35 Units   Injection sites:    muscle bilaterally ( a total of 10 units divided into 2 sites)   Procerus muscle (5 units)   Frontalis muscle bilaterally (a total of 20 units divided into 4 sites)   Temporalis muscle bilaterally (a total of 40 units divided into 8 sites)   Occipitalis muscle bilaterally (a total of 30 units divided into 6 sites)   Cervical paraspinal muscles (a total of 20 units divided into 4 sites)   Trapezius muscle bilaterally (a total of 30 units divided into 6 sites)   Masseter 5 units catalina      Complications: none       RTC for the next Botox injection: 10 weeks

## 2018-12-31 ENCOUNTER — EXTERNAL CHRONIC CARE MANAGEMENT (OUTPATIENT)
Dept: PRIMARY CARE CLINIC | Facility: CLINIC | Age: 53
End: 2018-12-31
Payer: MEDICARE

## 2018-12-31 PROCEDURE — 99490 CHRNC CARE MGMT STAFF 1ST 20: CPT | Mod: S$PBB,,, | Performed by: PSYCHIATRY & NEUROLOGY

## 2018-12-31 PROCEDURE — 99490 CHRNC CARE MGMT STAFF 1ST 20: CPT | Mod: PBBFAC | Performed by: PSYCHIATRY & NEUROLOGY

## 2018-12-31 PROCEDURE — 99490 PR CHRONIC CARE MGMT, 1ST 20 MIN: ICD-10-PCS | Mod: S$PBB,,, | Performed by: PSYCHIATRY & NEUROLOGY

## 2019-01-02 DIAGNOSIS — G47.00 INSOMNIA, UNSPECIFIED TYPE: ICD-10-CM

## 2019-01-02 RX ORDER — ZOLPIDEM TARTRATE 12.5 MG/1
TABLET, FILM COATED, EXTENDED RELEASE ORAL
Qty: 30 TABLET | Refills: 0 | Status: SHIPPED | OUTPATIENT
Start: 2019-01-02 | End: 2019-01-07 | Stop reason: SDUPTHER

## 2019-01-03 ENCOUNTER — TELEPHONE (OUTPATIENT)
Dept: NEUROLOGY | Facility: CLINIC | Age: 54
End: 2019-01-03

## 2019-01-03 NOTE — TELEPHONE ENCOUNTER
Called Patient and she states she has had a headache for 3 days.Scale a 10-informed her to please go to an urgent care or ED for evaluation.

## 2019-01-04 ENCOUNTER — PATIENT MESSAGE (OUTPATIENT)
Dept: SLEEP MEDICINE | Facility: CLINIC | Age: 54
End: 2019-01-04

## 2019-01-05 PROCEDURE — 93295 DEV INTERROG REMOTE 1/2/MLT: CPT | Mod: ,,, | Performed by: INTERNAL MEDICINE

## 2019-01-06 NOTE — PROGRESS NOTES
Outpatient Psychiatry Follow-Up Visit (MD/NP)    1/7/2019    Clinical Status of Patient:  Outpatient (Ambulatory)    Chief Complaint:  Amna Chawla is a 53 y.o. female who presents today for follow-up of depression and anxiety.  Met with patient.      Last Visit:  was on 4/03/18. Chart and  reviewed.     Interval History and Content of Current Session:  Current Medication Profile for Psych  · Continue Cymbalta 60 mg po BID  · Continue Abilify 5 mg po daily  · Continue Ambien 10 mg po q hs PRN insomnia  · Trial of Atarx (hydroxyzine) 50 mg po q hs PRN insomnia  · Continue Klonopin 1 mg po daily PRN severe anxiety  · Also taking Neurontin and Topamax from Neurology    Affect bright and congruent with mood.  Discussed anxiety during the holidays. Talked about [employer] party which tested her patience due to stress of family. Discussed assertiveness in setting boundaries and limits with family.  Compliant with medications and reports effectiveness and denies side effects. Reports tolerance to Klonopin.  Will start Atarax 10 mg PRN anxiety.  Sleep and appetite intact. Increased 11 lbs since last visit. Denies SI/HI/AVH. Encouraged pt to continue therapy with Dr. Monroe.     Psychotherapy:  · Target symptoms: depression, anxiety   · Why chosen therapy is appropriate versus another modality: relevant to diagnosis  · Outcome monitoring methods: self-report, observation  · Therapeutic intervention type: insight oriented psychotherapy, CBT  · Topics discussed/themes: relationships difficulties, parenting issues, stress related to medical comorbidities, difficulty managing affect in interpersonal relationships, building skills sets for symptom management, symptom recognition  · The patient's response to the intervention is accepting. The patient's progress toward treatment goals is fair.   · Duration of intervention: 19 minutes.    Review of Systems   · PSYCHIATRIC: Pertinant items are noted in the  narrative.  · CONSTITUTIONAL: No weight gain or loss.   · ENDOCRINE: No polydipsia or polyuria.  · INTEGUMENTARY: No rashes or lacerations.  · EYES: No exophthalmos, jaundice or blindness.  · ENT: No dizziness, tinnitus or hearing loss.  · RESPIRATORY: No shortness of breath.  · GASTROINTESTINAL: No nausea, vomiting, pain, constipation or diarrhea.  · GENITOURINARY: No frequency, dysuria or sexual dysfunction.  · HEMATOLOGIC/LYMPHATIC: No excessive bleeding, prolonged or excessive bleeding after dental extraction/injury.  · ALLERGIC/IMMUNOLOGIC: No allergic response to materials, foods or animals at this time.    Past Medical, Family and Social History: The patient's past medical, family and social history have been reviewed and updated as appropriate within the electronic medical record - see encounter notes.    Compliance: yes    Side effects: None    Risk Parameters:  Patient reports no suicidal ideation  Patient reports no homicidal ideation  Patient reports no self-injurious behavior  Patient reports no violent behavior    Exam (detailed: at least 9 elements; comprehensive: all 15 elements)   Constitutional  Vitals:  Most recent vital signs, dated less than 90 days prior to this appointment, were reviewed.   Vitals:    01/07/19 1052   BP: 130/77   Pulse: 70   Weight: 131.2 kg (289 lb 3.9 oz)        General:  unremarkable, age appropriate     Musculoskeletal  Muscle Strength/Tone:  no dystonia, no tremor, no tic   Gait & Station:  non-ataxic     Psychiatric  Speech:  no latency; no press   Mood & Affect:  dysthymic, irritable  irritable   Thought Process:  normal and logical   Associations:  intact   Thought Content:  normal, no suicidality, no homicidality, delusions, or paranoia   Insight:  has awareness of illness   Judgement: behavior is adequate to circumstances, age appropriate   Orientation:  grossly intact, person, place, situation, time/date   Memory: intact for content of interview, able to remember  recent events- yes, able to remember remote events- yes   Language: grossly intact   Attention Span & Concentration:  able to focus   Fund of Knowledge:  intact and appropriate to age and level of education, familiar with aspects of current personal life     Assessment and Diagnosis   Status/Progress: Based on the examination today, the patient's problem(s) is/are adequately but not ideally controlled.  New problems have not been presented today.   Co-morbidities and Lack of compliance are not complicating management of the primary condition.  There are no active rule-out diagnoses for this patient at this time.     General Impression:       ICD-10-CM ICD-9-CM   1. Depression, major, recurrent, moderate F33.1 296.32   2. Anxiety F41.9 300.00   3. Insomnia, unspecified type G47.00 780.52       Intervention/Counseling/Treatment Plan   · Medication Management: The risks and benefits of medication were discussed with the patient.   · Continue Cymbalta 60 mg po BID  · Continue Abilify 5 mg po daily  · Continue Ambien 10 mg po q hs PRN insomnia  · Continue  Atarx (hydroxyzine) 50 mg po q hs PRN insomnia and start Atarax 10 mg po TID PRN anxiety  · Continue Klonopin 1 mg po daily PRN severe anxiety  · Continue individual therapy with Dr. Rosales    Return to Clinic: 6 months

## 2019-01-07 ENCOUNTER — OFFICE VISIT (OUTPATIENT)
Dept: PSYCHIATRY | Facility: CLINIC | Age: 54
End: 2019-01-07
Payer: MEDICARE

## 2019-01-07 VITALS
WEIGHT: 289.25 LBS | DIASTOLIC BLOOD PRESSURE: 77 MMHG | SYSTOLIC BLOOD PRESSURE: 130 MMHG | BODY MASS INDEX: 49.65 KG/M2 | HEART RATE: 70 BPM

## 2019-01-07 DIAGNOSIS — F33.1 DEPRESSION, MAJOR, RECURRENT, MODERATE: Primary | ICD-10-CM

## 2019-01-07 DIAGNOSIS — F41.9 ANXIETY: ICD-10-CM

## 2019-01-07 DIAGNOSIS — G47.00 INSOMNIA, UNSPECIFIED TYPE: ICD-10-CM

## 2019-01-07 PROCEDURE — 99213 OFFICE O/P EST LOW 20 MIN: CPT | Mod: PBBFAC | Performed by: NURSE PRACTITIONER

## 2019-01-07 PROCEDURE — 99213 PR OFFICE/OUTPT VISIT, EST, LEVL III, 20-29 MIN: ICD-10-PCS | Mod: S$PBB,,, | Performed by: NURSE PRACTITIONER

## 2019-01-07 PROCEDURE — 90833 PR PSYCHOTHERAPY W/PATIENT W/E&M, 30 MIN (ADD ON): ICD-10-PCS | Mod: S$PBB,,, | Performed by: NURSE PRACTITIONER

## 2019-01-07 PROCEDURE — 99999 PR PBB SHADOW E&M-EST. PATIENT-LVL III: CPT | Mod: PBBFAC,,, | Performed by: NURSE PRACTITIONER

## 2019-01-07 PROCEDURE — 90833 PSYTX W PT W E/M 30 MIN: CPT | Mod: S$PBB,,, | Performed by: NURSE PRACTITIONER

## 2019-01-07 PROCEDURE — 90833 PSYTX W PT W E/M 30 MIN: CPT | Mod: PBBFAC | Performed by: NURSE PRACTITIONER

## 2019-01-07 PROCEDURE — 99999 PR PBB SHADOW E&M-EST. PATIENT-LVL III: ICD-10-PCS | Mod: PBBFAC,,, | Performed by: NURSE PRACTITIONER

## 2019-01-07 PROCEDURE — 99213 OFFICE O/P EST LOW 20 MIN: CPT | Mod: S$PBB,,, | Performed by: NURSE PRACTITIONER

## 2019-01-07 RX ORDER — HYDROXYZINE HYDROCHLORIDE 50 MG/1
50 TABLET, FILM COATED ORAL NIGHTLY PRN
Qty: 30 TABLET | Refills: 5 | Status: SHIPPED | OUTPATIENT
Start: 2019-01-07 | End: 2019-04-04 | Stop reason: SDUPTHER

## 2019-01-07 RX ORDER — CLONAZEPAM 1 MG/1
1 TABLET ORAL DAILY PRN
Qty: 30 TABLET | Refills: 3 | Status: SHIPPED | OUTPATIENT
Start: 2019-01-07 | End: 2019-04-04 | Stop reason: SDUPTHER

## 2019-01-07 RX ORDER — ARIPIPRAZOLE 5 MG/1
TABLET ORAL
Qty: 30 TABLET | Refills: 5 | Status: SHIPPED | OUTPATIENT
Start: 2019-01-07 | End: 2019-04-04

## 2019-01-07 RX ORDER — HYDROXYZINE HYDROCHLORIDE 10 MG/1
10 TABLET, FILM COATED ORAL 3 TIMES DAILY PRN
Qty: 90 TABLET | Refills: 5 | Status: SHIPPED | OUTPATIENT
Start: 2019-01-07 | End: 2019-04-04 | Stop reason: SDUPTHER

## 2019-01-07 RX ORDER — DULOXETIN HYDROCHLORIDE 60 MG/1
60 CAPSULE, DELAYED RELEASE ORAL 2 TIMES DAILY
Qty: 60 CAPSULE | Refills: 5 | Status: SHIPPED | OUTPATIENT
Start: 2019-01-07 | End: 2019-04-04 | Stop reason: SDUPTHER

## 2019-01-07 RX ORDER — ZOLPIDEM TARTRATE 12.5 MG/1
TABLET, FILM COATED, EXTENDED RELEASE ORAL
Qty: 30 TABLET | Refills: 3 | Status: SHIPPED | OUTPATIENT
Start: 2019-01-07 | End: 2019-04-04 | Stop reason: SDUPTHER

## 2019-01-11 ENCOUNTER — TELEPHONE (OUTPATIENT)
Dept: PHARMACY | Facility: CLINIC | Age: 54
End: 2019-01-11

## 2019-01-14 ENCOUNTER — PATIENT MESSAGE (OUTPATIENT)
Dept: INTERNAL MEDICINE | Facility: CLINIC | Age: 54
End: 2019-01-14

## 2019-01-16 ENCOUNTER — PATIENT MESSAGE (OUTPATIENT)
Dept: INTERNAL MEDICINE | Facility: CLINIC | Age: 54
End: 2019-01-16

## 2019-01-17 ENCOUNTER — PATIENT MESSAGE (OUTPATIENT)
Dept: INTERNAL MEDICINE | Facility: CLINIC | Age: 54
End: 2019-01-17

## 2019-01-18 ENCOUNTER — HOME CARE VISIT (OUTPATIENT)
Dept: NEUROLOGY | Facility: HOSPITAL | Age: 54
End: 2019-01-18

## 2019-01-18 ENCOUNTER — LAB VISIT (OUTPATIENT)
Dept: LAB | Facility: HOSPITAL | Age: 54
End: 2019-01-18
Attending: PHYSICIAN ASSISTANT
Payer: MEDICARE

## 2019-01-18 ENCOUNTER — OFFICE VISIT (OUTPATIENT)
Dept: INTERNAL MEDICINE | Facility: CLINIC | Age: 54
End: 2019-01-18
Payer: MEDICARE

## 2019-01-18 VITALS
SYSTOLIC BLOOD PRESSURE: 130 MMHG | HEIGHT: 64 IN | DIASTOLIC BLOOD PRESSURE: 98 MMHG | WEIGHT: 292.13 LBS | HEART RATE: 72 BPM | OXYGEN SATURATION: 99 % | BODY MASS INDEX: 49.87 KG/M2

## 2019-01-18 DIAGNOSIS — D64.9 ANEMIA, UNSPECIFIED TYPE: ICD-10-CM

## 2019-01-18 DIAGNOSIS — Z20.828 EXPOSURE TO THE FLU: ICD-10-CM

## 2019-01-18 DIAGNOSIS — J02.9 SORE THROAT: ICD-10-CM

## 2019-01-18 DIAGNOSIS — R53.83 FATIGUE, UNSPECIFIED TYPE: Primary | ICD-10-CM

## 2019-01-18 DIAGNOSIS — R53.83 FATIGUE, UNSPECIFIED TYPE: ICD-10-CM

## 2019-01-18 LAB
ANION GAP SERPL CALC-SCNC: 4 MMOL/L
BASOPHILS # BLD AUTO: 0 K/UL
BASOPHILS NFR BLD: 0 %
BUN SERPL-MCNC: 11 MG/DL
CALCIUM SERPL-MCNC: 9 MG/DL
CHLORIDE SERPL-SCNC: 108 MMOL/L
CO2 SERPL-SCNC: 26 MMOL/L
CREAT SERPL-MCNC: 0.8 MG/DL
DIFFERENTIAL METHOD: ABNORMAL
EOSINOPHIL # BLD AUTO: 0 K/UL
EOSINOPHIL NFR BLD: 0.6 %
ERYTHROCYTE [DISTWIDTH] IN BLOOD BY AUTOMATED COUNT: 16 %
EST. GFR  (AFRICAN AMERICAN): >60 ML/MIN/1.73 M^2
EST. GFR  (NON AFRICAN AMERICAN): >60 ML/MIN/1.73 M^2
FERRITIN SERPL-MCNC: 59 NG/ML
GLUCOSE SERPL-MCNC: 98 MG/DL
HCT VFR BLD AUTO: 33.7 %
HGB BLD-MCNC: 10.7 G/DL
INFLUENZA A, MOLECULAR: NEGATIVE
INFLUENZA B, MOLECULAR: NEGATIVE
IRON SERPL-MCNC: 80 UG/DL
LYMPHOCYTES # BLD AUTO: 1.4 K/UL
LYMPHOCYTES NFR BLD: 40.9 %
MCH RBC QN AUTO: 28.9 PG
MCHC RBC AUTO-ENTMCNC: 31.8 G/DL
MCV RBC AUTO: 91 FL
MONOCYTES # BLD AUTO: 0.5 K/UL
MONOCYTES NFR BLD: 14.1 %
NEUTROPHILS # BLD AUTO: 1.5 K/UL
NEUTROPHILS NFR BLD: 44.4 %
PLATELET # BLD AUTO: 203 K/UL
PMV BLD AUTO: 11.6 FL
POTASSIUM SERPL-SCNC: 4 MMOL/L
RBC # BLD AUTO: 3.7 M/UL
SATURATED IRON: 24 %
SODIUM SERPL-SCNC: 138 MMOL/L
SPECIMEN SOURCE: NORMAL
TOTAL IRON BINDING CAPACITY: 337 UG/DL
TRANSFERRIN SERPL-MCNC: 228 MG/DL
TSH SERPL DL<=0.005 MIU/L-ACNC: 0.55 UIU/ML
WBC # BLD AUTO: 3.4 K/UL

## 2019-01-18 PROCEDURE — 85025 COMPLETE CBC W/AUTO DIFF WBC: CPT

## 2019-01-18 PROCEDURE — 82728 ASSAY OF FERRITIN: CPT

## 2019-01-18 PROCEDURE — 87502 INFLUENZA DNA AMP PROBE: CPT

## 2019-01-18 PROCEDURE — 99213 OFFICE O/P EST LOW 20 MIN: CPT | Mod: S$PBB,,, | Performed by: PHYSICIAN ASSISTANT

## 2019-01-18 PROCEDURE — 83540 ASSAY OF IRON: CPT

## 2019-01-18 PROCEDURE — 99999 PR PBB SHADOW E&M-EST. PATIENT-LVL V: CPT | Mod: PBBFAC,,, | Performed by: PHYSICIAN ASSISTANT

## 2019-01-18 PROCEDURE — 84443 ASSAY THYROID STIM HORMONE: CPT

## 2019-01-18 PROCEDURE — 99999 PR PBB SHADOW E&M-EST. PATIENT-LVL V: ICD-10-PCS | Mod: PBBFAC,,, | Performed by: PHYSICIAN ASSISTANT

## 2019-01-18 PROCEDURE — 80048 BASIC METABOLIC PNL TOTAL CA: CPT

## 2019-01-18 PROCEDURE — 36415 COLL VENOUS BLD VENIPUNCTURE: CPT

## 2019-01-18 PROCEDURE — 99215 OFFICE O/P EST HI 40 MIN: CPT | Mod: PBBFAC | Performed by: PHYSICIAN ASSISTANT

## 2019-01-18 PROCEDURE — 99213 PR OFFICE/OUTPT VISIT, EST, LEVL III, 20-29 MIN: ICD-10-PCS | Mod: S$PBB,,, | Performed by: PHYSICIAN ASSISTANT

## 2019-01-18 NOTE — TELEPHONE ENCOUNTER
Patient called from the primary care building to see if [facility] can deliver her medication, Amovig. Advised her that we canNOT deliver to the pharmacy but we can meet her. CC information is needed to proceed - $8.35 copay. She would rather pay cash so she will just come by before we close today, 1/18/19. TTN

## 2019-01-18 NOTE — PROGRESS NOTES
Subjective:       Patient ID: Amna Chawla is a 53 y.o. female.    Chief Complaint: Fatigue    HPI     Established pt of Tiffany Mina MD    C/o fatigue and having no energy for the past week. Notes mild sore throat that started this AM. States her grandson and  have both had the flu recently.  She denies cp, sob, ha, cough, or fevers. She states she is sleeping well, although without her CPAP (insurance issues). She describes her mood as good. Follows closely with Psychiatry.        Review of patient's allergies indicates:   Allergen Reactions    Aspirin Hives    Imitrex [sumatriptan] Palpitations    Penicillins Hives and Swelling    Shellfish containing products Anaphylaxis     seafood    Percocet [oxycodone-acetaminophen] Itching     States can take    Reglan [metoclopramide hcl] Other (See Comments)     Parkinsonism      Social History     Tobacco Use    Smoking status: Never Smoker    Smokeless tobacco: Never Used   Substance Use Topics    Alcohol use: No    Drug use: No     Past Medical History:   Diagnosis Date    Anticoagulant long-term use     Anxiety     Asthma     Atrial fibrillation     Brain anoxic injury     Cervicalgia 8/28/2014    CHI (closed head injury) 2/19/2013    Convulsion 5/30/2015    Decreased ROM of left shoulder 4/12/2017    Defibrillator activation 2013    Depression     Heart block     History of sudden cardiac arrest 2/2013    PEA arrest with subsequent long-QT    Hx of psychiatric care     Hypertension     Left atrial enlargement 4/11/2018    Pacemaker 2013    Paresthesia 11/1/2013    Prolonged Q-T interval on ECG 2/8/2013    Psychiatric problem     Seizures     Sleep difficulties     Stroke     weakness lt side    Therapy     Thyroid disease     Upper airway resistance syndrome 2/21/2017         Review of Systems   Constitutional: Positive for fatigue. Negative for chills, fever and unexpected weight change.   HENT: Positive for sore  "throat. Negative for congestion, ear pain, postnasal drip and rhinorrhea.    Eyes: Negative for visual disturbance.   Respiratory: Negative for cough, shortness of breath and wheezing.    Cardiovascular: Negative for chest pain and leg swelling.   Gastrointestinal: Negative for abdominal pain, nausea and vomiting.   Skin: Negative for rash.   Neurological: Negative for weakness and headaches.       Objective: BP (!) 130/98   Pulse 72   Ht 5' 4" (1.626 m)   Wt 132.5 kg (292 lb 1.8 oz)   SpO2 99%   BMI 50.14 kg/m²         Physical Exam   Constitutional: She is oriented to person, place, and time. She appears well-developed and well-nourished. No distress.   HENT:   Head: Normocephalic and atraumatic.   Mouth/Throat: Oropharynx is clear and moist.   Eyes: Pupils are equal, round, and reactive to light.   Cardiovascular: Normal rate and regular rhythm. Exam reveals no friction rub.   No murmur heard.  Pulmonary/Chest: Effort normal and breath sounds normal. She has no wheezes. She has no rales.   Abdominal: Soft. Bowel sounds are normal. There is no tenderness.   Musculoskeletal: Normal range of motion.   Neurological: She is alert and oriented to person, place, and time.   Skin: Skin is warm and dry. Capillary refill takes less than 2 seconds. No rash noted.   Psychiatric: She has a normal mood and affect.   Vitals reviewed.      Assessment:       1. Fatigue, unspecified type    2. Sore throat    3. Anemia, unspecified type    4. Exposure to the flu        Plan:         Amna was seen today for fatigue.    Diagnoses and all orders for this visit:    Fatigue, unspecified type  Labs as below for eval   -     Influenza A & B by Molecular  -     CBC auto differential; Future  -     Basic metabolic panel; Future  -     TSH; Future  -     Iron and TIBC; Future  -     Ferritin; Future    Sore throat  Conservative measures discussed  Warm salt gargles, warm teas, throat spray or lozenges  -     Influenza A & B by " Molecular    Anemia, unspecified type  Noted on prev lab  Will recheck h/h and iron levels  She denies any overt bleeding  -     CBC auto differential; Future  -     Iron and TIBC; Future  -     Ferritin; Future    Exposure to the flu  -     Influenza A & B by Molecular      Dasha Osorio PA-C

## 2019-01-21 ENCOUNTER — TELEPHONE (OUTPATIENT)
Dept: INTERNAL MEDICINE | Facility: CLINIC | Age: 54
End: 2019-01-21

## 2019-01-21 NOTE — TELEPHONE ENCOUNTER
----- Message from Quita Menchaca sent at 1/21/2019  9:44 AM CST -----  Contact: [phone]  Patient would like to get test results    Name of test (lab, mammo, etc.):   Labs    Date of test:  1/18    Ordering provider: NP    Where was the test performed:  [facility]    Would you prefer a response via [employer]?:      Comments:  Please advise, thanks

## 2019-01-21 NOTE — TELEPHONE ENCOUNTER
Seen pt with c/o generalized fatigue.  Lab significant for anemia.   Pt denies any overt bleeding.   MCV normal  Iron studies Normal.     Component      Latest Ref Rng & Units 1/18/2019 9/30/2018   WBC      3.90 - 12.70 K/uL 3.40 (L) 8.59   RBC      4.00 - 5.40 M/uL 3.70 (L) 3.93 (L)   Hemoglobin      12.0 - 16.0 g/dL 10.7 (L) 11.8 (L)   Hematocrit      37.0 - 48.5 % 33.7 (L) 35.3 (L)   MCV      82 - 98 fL 91 90   MCH      27.0 - 31.0 pg 28.9 30.0   MCHC      32.0 - 36.0 g/dL 31.8 (L) 33.4   RDW      11.5 - 14.5 % 16.0 (H) 15.8 (H)     Component      Latest Ref Rng & Units 1/18/2019   Ferritin      20.0 - 300.0 ng/mL 59     Component      Latest Ref Rng & Units 1/18/2019   Iron      30 - 160 ug/dL 80   Transferrin      200 - 375 mg/dL 228   TIBC      250 - 450 ug/dL 337   Saturated Iron      20 - 50 % 24       Discussed with patient.   Advised PCP f/u to continue anemia workup/eval, has appt scheduled for next week.    Future Appointments   Date Time Provider Department Center   1/28/2019  9:00 AM Tiffany Mina MD [facility]   1/28/2019 10:20 AM PACEMAKER, [facility]   2/11/2019  8:00 AM HOME MONITOR DEVICE CHECK, [facility]   5/13/2019  8:00 AM HOME MONITOR DEVICE CHECK, [facility]

## 2019-01-28 ENCOUNTER — PATIENT MESSAGE (OUTPATIENT)
Dept: CARDIOLOGY | Facility: CLINIC | Age: 54
End: 2019-01-28

## 2019-01-28 ENCOUNTER — OFFICE VISIT (OUTPATIENT)
Dept: INTERNAL MEDICINE | Facility: CLINIC | Age: 54
End: 2019-01-28
Payer: MEDICARE

## 2019-01-28 ENCOUNTER — LAB VISIT (OUTPATIENT)
Dept: LAB | Facility: HOSPITAL | Age: 54
End: 2019-01-28
Attending: INTERNAL MEDICINE
Payer: MEDICARE

## 2019-01-28 ENCOUNTER — CLINICAL SUPPORT (OUTPATIENT)
Dept: CARDIOLOGY | Facility: HOSPITAL | Age: 54
End: 2019-01-28
Attending: INTERNAL MEDICINE
Payer: MEDICARE

## 2019-01-28 VITALS
WEIGHT: 290.38 LBS | DIASTOLIC BLOOD PRESSURE: 74 MMHG | BODY MASS INDEX: 49.57 KG/M2 | SYSTOLIC BLOOD PRESSURE: 118 MMHG | HEART RATE: 78 BPM | OXYGEN SATURATION: 99 % | HEIGHT: 64 IN

## 2019-01-28 DIAGNOSIS — E78.2 MIXED HYPERLIPIDEMIA: ICD-10-CM

## 2019-01-28 DIAGNOSIS — R53.83 FATIGUE, UNSPECIFIED TYPE: ICD-10-CM

## 2019-01-28 DIAGNOSIS — Z86.73 HISTORY OF TIA (TRANSIENT ISCHEMIC ATTACK): ICD-10-CM

## 2019-01-28 DIAGNOSIS — D53.9 NUTRITIONAL ANEMIA: ICD-10-CM

## 2019-01-28 DIAGNOSIS — Z86.74 HISTORY OF SUDDEN CARDIAC ARREST: ICD-10-CM

## 2019-01-28 DIAGNOSIS — Z95.810 BIVENTRICULAR AUTOMATIC IMPLANTABLE CARDIOVERTER DEFIBRILLATOR IN SITU: ICD-10-CM

## 2019-01-28 DIAGNOSIS — I42.8 NONISCHEMIC CARDIOMYOPATHY: ICD-10-CM

## 2019-01-28 DIAGNOSIS — R53.83 FATIGUE, UNSPECIFIED TYPE: Primary | ICD-10-CM

## 2019-01-28 DIAGNOSIS — R73.03 PREDIABETES: ICD-10-CM

## 2019-01-28 DIAGNOSIS — I44.2 COMPLETE AV BLOCK: ICD-10-CM

## 2019-01-28 DIAGNOSIS — I10 ESSENTIAL HYPERTENSION: Chronic | ICD-10-CM

## 2019-01-28 DIAGNOSIS — S06.9X0S COGNITIVE DEFICIT AS LATE EFFECT OF TRAUMATIC BRAIN INJURY: ICD-10-CM

## 2019-01-28 DIAGNOSIS — E78.2 MIXED HYPERLIPIDEMIA: Chronic | ICD-10-CM

## 2019-01-28 DIAGNOSIS — K21.9 GASTROESOPHAGEAL REFLUX DISEASE WITHOUT ESOPHAGITIS: ICD-10-CM

## 2019-01-28 DIAGNOSIS — I46.9 CARDIAC ARREST: ICD-10-CM

## 2019-01-28 DIAGNOSIS — F33.1 DEPRESSION, MAJOR, RECURRENT, MODERATE: ICD-10-CM

## 2019-01-28 DIAGNOSIS — Z95.810 ICD (IMPLANTABLE CARDIOVERTER-DEFIBRILLATOR) IN PLACE: ICD-10-CM

## 2019-01-28 DIAGNOSIS — Z12.11 SCREEN FOR COLON CANCER: ICD-10-CM

## 2019-01-28 DIAGNOSIS — E66.01 OBESITY, CLASS III, BMI 40-49.9 (MORBID OBESITY): ICD-10-CM

## 2019-01-28 DIAGNOSIS — I48.0 PAROXYSMAL ATRIAL FIBRILLATION: ICD-10-CM

## 2019-01-28 DIAGNOSIS — F06.8 COGNITIVE DEFICIT AS LATE EFFECT OF TRAUMATIC BRAIN INJURY: ICD-10-CM

## 2019-01-28 LAB
25(OH)D3+25(OH)D2 SERPL-MCNC: 9 NG/ML
ALBUMIN SERPL BCP-MCNC: 3.1 G/DL
ALP SERPL-CCNC: 62 U/L
ALT SERPL W/O P-5'-P-CCNC: 32 U/L
ANION GAP SERPL CALC-SCNC: 5 MMOL/L
AST SERPL-CCNC: 21 U/L
BILIRUB SERPL-MCNC: 0.2 MG/DL
BUN SERPL-MCNC: 13 MG/DL
CALCIUM SERPL-MCNC: 9.3 MG/DL
CHLORIDE SERPL-SCNC: 107 MMOL/L
CHOLEST SERPL-MCNC: 104 MG/DL
CHOLEST/HDLC SERPL: 3.4 {RATIO}
CO2 SERPL-SCNC: 25 MMOL/L
CREAT SERPL-MCNC: 0.8 MG/DL
EST. GFR  (AFRICAN AMERICAN): >60 ML/MIN/1.73 M^2
EST. GFR  (NON AFRICAN AMERICAN): >60 ML/MIN/1.73 M^2
ESTIMATED AVG GLUCOSE: 128 MG/DL
FOLATE SERPL-MCNC: 12.3 NG/ML
GLUCOSE SERPL-MCNC: 101 MG/DL
HBA1C MFR BLD HPLC: 6.1 %
HDLC SERPL-MCNC: 31 MG/DL
HDLC SERPL: 29.8 %
LDLC SERPL CALC-MCNC: 51.8 MG/DL
NONHDLC SERPL-MCNC: 73 MG/DL
POTASSIUM SERPL-SCNC: 4 MMOL/L
PROT SERPL-MCNC: 9.7 G/DL
SODIUM SERPL-SCNC: 137 MMOL/L
TRIGL SERPL-MCNC: 106 MG/DL
VIT B12 SERPL-MCNC: 308 PG/ML

## 2019-01-28 PROCEDURE — 99999 PR PBB SHADOW E&M-EST. PATIENT-LVL III: CPT | Mod: PBBFAC,,, | Performed by: INTERNAL MEDICINE

## 2019-01-28 PROCEDURE — 99214 OFFICE O/P EST MOD 30 MIN: CPT | Mod: S$PBB,,, | Performed by: INTERNAL MEDICINE

## 2019-01-28 PROCEDURE — 36415 COLL VENOUS BLD VENIPUNCTURE: CPT

## 2019-01-28 PROCEDURE — 82607 VITAMIN B-12: CPT

## 2019-01-28 PROCEDURE — 80053 COMPREHEN METABOLIC PANEL: CPT

## 2019-01-28 PROCEDURE — 99999 PR PBB SHADOW E&M-EST. PATIENT-LVL III: ICD-10-PCS | Mod: PBBFAC,,, | Performed by: INTERNAL MEDICINE

## 2019-01-28 PROCEDURE — 99214 PR OFFICE/OUTPT VISIT, EST, LEVL IV, 30-39 MIN: ICD-10-PCS | Mod: S$PBB,,, | Performed by: INTERNAL MEDICINE

## 2019-01-28 PROCEDURE — 83036 HEMOGLOBIN GLYCOSYLATED A1C: CPT

## 2019-01-28 PROCEDURE — 82746 ASSAY OF FOLIC ACID SERUM: CPT

## 2019-01-28 PROCEDURE — 99213 OFFICE O/P EST LOW 20 MIN: CPT | Mod: PBBFAC,25 | Performed by: INTERNAL MEDICINE

## 2019-01-28 PROCEDURE — 93284 PRGRMG EVAL IMPLANTABLE DFB: CPT

## 2019-01-28 PROCEDURE — 82306 VITAMIN D 25 HYDROXY: CPT

## 2019-01-28 PROCEDURE — 80061 LIPID PANEL: CPT

## 2019-01-28 NOTE — PROGRESS NOTES
INTERNAL MEDICINE ESTABLISHED PATIENT VISIT NOTE    Subjective:     Chief Complaint: Follow-up and Fatigue       Patient ID: Amna Chawla is a 53 y.o. female with hx CVA and TIA c residual cognitive deficits (most recently c TIA 9/2018 per pt, has mild B weakness from several strokes in the past), carotid a disease, HTN, paroxysmal A fib c LAE, hx sudden cardiac arrest c VF and no ischemic heart disease and preserved EF (2013), intermittent 3rd deg AV block and symptomatic LVEF of 40% in setting of normal PET stress and chronic RV pacing s/p CRT-D, HLD, thyroid nodule s/p FNA 7/2018 c path c/w benign follicular nodule, depression with anxiety, chronic complex h/a c occipital neuralgia followed by Neuro and on mult meds and has also had tx c botox, GERD c hiatal hernia, B carpal tunnel s/p release B, SHIRA on APAP 6-20cm followed in sleep clinic, insomnia, prediabetes, last seen by me in Oct, here today for f/u.    Was seen by MARIO Osorio 10 days ago for c/o fatigue and decreased energy, flu swab neg.    Sx of fatigue present x2 weeks.  No blood in stools.  Post menopausal, no vaginal bleeding.  Labs done last week c marginal drop in anemia but overall normal fe studies.  Labs otherwise unremarkable.  Says b12 shot given by Neuro last week who she sees for migraines.  No recent change to meds but does not Klonopin prn and ambien and atarax.    Has SHIRA but has been off CPAP since last year.  Was supposed to be on APAP but says not using it since her defibrillator was replaced and had discomfort from the hose running across her scar and says she is supposed to get a new machine this Wed.    Past Medical History:  Past Medical History:   Diagnosis Date    Anticoagulant long-term use     Anxiety     Asthma     Atrial fibrillation     Brain anoxic injury     Cervicalgia 8/28/2014    CHI (closed head injury) 2/19/2013    Convulsion 5/30/2015    Decreased ROM of left shoulder 4/12/2017    Defibrillator activation  2013    Depression     Heart block     History of sudden cardiac arrest 2/2013    PEA arrest with subsequent long-QT    Hx of psychiatric care     Hypertension     Left atrial enlargement 4/11/2018    Pacemaker 2013    Paresthesia 11/1/2013    Prolonged Q-T interval on ECG 2/8/2013    Psychiatric problem     Seizures     Sleep difficulties     Stroke     weakness lt side    Therapy     Thyroid disease     Upper airway resistance syndrome 2/21/2017       Home Medications:  Prior to Admission medications    Medication Sig Start Date End Date Taking? Authorizing Provider   ARIPiprazole (ABILIFY) 5 MG Tab TAKE 1 TABLET(5 MG) BY MOUTH EVERY EVENING 1/7/19  Yes Kade Huffman III, NP   carvedilol (COREG) 25 MG tablet TAKE 1 TABLET(25 MG) BY MOUTH TWICE DAILY WITH MEALS 8/21/18  Yes Rin Martins MD   clonazePAM (KLONOPIN) 1 MG tablet Take 1 tablet (1 mg total) by mouth daily as needed for Anxiety. 1/7/19  Yes Kade Huffman III, NP   clopidogrel (PLAVIX) 75 mg tablet Take 1 tablet (75 mg total) by mouth once daily. 6/7/18  Yes Perlita Ruth MD   donepezil (ARICEPT) 10 MG tablet Take 1 tablet (10 mg total) by mouth 2 (two) times daily. 7/18/17  Yes Toby Paredes MD   DULoxetine (CYMBALTA) 60 MG capsule Take 1 capsule (60 mg total) by mouth 2 (two) times daily. 1/7/19  Yes Kade Huffman III, NP   erenumab-aooe (AIMOVIG AUTOINJECTOR) 70 mg/mL AtIn Inject 2 mLs (140 mg total) into the skin every 30 days. 10/30/18  Yes David Cortez MD   gabapentin (NEURONTIN) 300 MG capsule Take 3 capsules (900 mg total) by mouth 3 (three) times daily. 11/30/16  Yes David Cortez MD   hydrOXYzine (ATARAX) 50 MG tablet Take 1 tablet (50 mg total) by mouth nightly as needed. 1/7/19  Yes Kade Huffman III, NP   hydrOXYzine HCl (ATARAX) 10 MG Tab Take 1 tablet (10 mg total) by mouth 3 (three) times daily as needed (anxiety). 1/7/19  Yes Kade Huffman III, NP   losartan (COZAAR) 50 MG tablet Take 1  tablet (50 mg total) by mouth once daily. 7/20/18 7/20/19 Yes Avelino Mejia MD   magnesium 200 mg Tab Take 200 mg by mouth once daily.  Patient taking differently: Take 200 mg by mouth every other day.  3/7/18  Yes David Cortez MD   ondansetron (ZOFRAN-ODT) 4 MG TbDL Take 1 tablet (4 mg total) by mouth every 12 (twelve) hours as needed (nausea). 11/6/18  Yes Mariel Tomlinson NP   pantoprazole (PROTONIX) 40 MG tablet Take 1 tablet (40 mg total) by mouth once daily. 7/20/15  Yes MARIO Wood   rivaroxaban (XARELTO) 20 mg Tab Take 1 tablet (20 mg total) by mouth daily with dinner or evening meal. 1/10/19  Yes Avelino Mejia MD   rosuvastatin (CRESTOR) 40 MG Tab Take 1 tablet (40 mg total) by mouth every evening. 8/21/18 8/21/19 Yes Rin Martins MD   tiaGABine (GABITRIL) 4 MG tablet Take 1 tablet (4 mg total) by mouth every evening. 3/7/18  Yes David Cortez MD   zolpidem (AMBIEN CR) 12.5 MG CR tablet TAKE 1 TABLET BY MOUTH NIGHTLY AS NEEDED FOR INSOMNIA 1/7/19  Yes Kade Huffman III, NP       Allergies:  Review of patient's allergies indicates:   Allergen Reactions    Aspirin Hives    Imitrex [sumatriptan] Palpitations    Penicillins Hives and Swelling    Shellfish containing products Anaphylaxis     seafood    Percocet [oxycodone-acetaminophen] Itching     States can take    Reglan [metoclopramide hcl] Other (See Comments)     Parkinsonism        Social History:  Social History     Tobacco Use    Smoking status: Never Smoker    Smokeless tobacco: Never Used   Substance Use Topics    Alcohol use: No    Drug use: No        Review of Systems   Constitutional: Positive for fatigue. Negative for chills and fever.   Respiratory: Negative for cough, chest tightness and shortness of breath.    Cardiovascular: Negative for chest pain.   Gastrointestinal: Negative for abdominal pain and blood in stool.   Genitourinary: Negative for dysuria and frequency.         Health Maintenance:  "  Immunizations:   Influenza 9/2018  TDap 10/2/2018  Pneumovax 9/2018 here per pt  Shingrix rec once back in stock     Cancer Screening:  PAP: 11/2017, has f/u c Dr. Marroquin tomorrow.  Mammogram:  10/3/2018 benign  Colonoscopy:  6/2014 unable to assess ascending colon due to excessive looping of the colon and body habitus, rec repeat csc in 3 years but was told she would have to hold blood thinners.  After discussing risks and benefits, have opted to check FIT for now.       Objective:   /74 (BP Location: Left arm, Patient Position: Sitting, BP Method: Large (Manual))   Pulse 78   Ht 5' 4" (1.626 m)   Wt 131.7 kg (290 lb 5.5 oz)   SpO2 99%   BMI 49.84 kg/m²        General: AAO x3, no apparent distress  CV: RRR, no m/r/g  Pulm: Lungs CTAB, no crackles, no wheezes  Abd: s/NT/ND +BS  Extremities: no c/c/e    Labs:     Lab Results   Component Value Date    WBC 3.40 (L) 01/18/2019    HGB 10.7 (L) 01/18/2019    HCT 33.7 (L) 01/18/2019    MCV 91 01/18/2019     01/18/2019     Lab Results   Component Value Date    IRON 80 01/18/2019    TIBC 337 01/18/2019    FERRITIN 59 01/18/2019     CMP  Sodium   Date Value Ref Range Status   01/18/2019 138 136 - 145 mmol/L Final     Potassium   Date Value Ref Range Status   01/18/2019 4.0 3.5 - 5.1 mmol/L Final     Chloride   Date Value Ref Range Status   01/18/2019 108 95 - 110 mmol/L Final     CO2   Date Value Ref Range Status   01/18/2019 26 23 - 29 mmol/L Final     Glucose   Date Value Ref Range Status   01/18/2019 98 70 - 110 mg/dL Final     BUN, Bld   Date Value Ref Range Status   01/18/2019 11 6 - 20 mg/dL Final     Creatinine   Date Value Ref Range Status   01/18/2019 0.8 0.5 - 1.4 mg/dL Final     Calcium   Date Value Ref Range Status   01/18/2019 9.0 8.7 - 10.5 mg/dL Final     Total Protein   Date Value Ref Range Status   09/30/2018 8.6 (H) 6.0 - 8.4 g/dL Final     Albumin   Date Value Ref Range Status   09/30/2018 2.8 (L) 3.5 - 5.2 g/dL Final     Total " Bilirubin   Date Value Ref Range Status   09/30/2018 0.1 0.1 - 1.0 mg/dL Final     Comment:     For infants and newborns, interpretation of results should be based  on gestational age, weight and in agreement with clinical  observations.  Premature Infant recommended reference ranges:  Up to 24 hours.............<8.0 mg/dL  Up to 48 hours............<12.0 mg/dL  3-5 days..................<15.0 mg/dL  6-29 days.................<15.0 mg/dL       Alkaline Phosphatase   Date Value Ref Range Status   09/30/2018 66 55 - 135 U/L Final     AST   Date Value Ref Range Status   09/30/2018 11 10 - 40 U/L Final     ALT   Date Value Ref Range Status   09/30/2018 17 10 - 44 U/L Final     Anion Gap   Date Value Ref Range Status   01/18/2019 4 (L) 8 - 16 mmol/L Final     eGFR if    Date Value Ref Range Status   01/18/2019 >60 >60 mL/min/1.73 m^2 Final     eGFR if non    Date Value Ref Range Status   01/18/2019 >60 >60 mL/min/1.73 m^2 Final     Comment:     Calculation used to obtain the estimated glomerular filtration  rate (eGFR) is the CKD-EPI equation.        Lab Results   Component Value Date    LDLCALC 56.6 (L) 09/28/2018     Lab Results   Component Value Date    HGBA1C 5.9 (H) 09/28/2018     Lab Results   Component Value Date    TSH 0.553 01/18/2019       Assessment/Plan     Amna was seen today for follow-up and fatigue.    Diagnoses and all orders for this visit:    Fatigue, unspecified type  Suspect due to being off APAP, pt to get new machine on Wed, cont f/u in sleep clinic.  Anemia normocytic, will add b12 and folate levels.  Iron normal.  TFTs normal.  Med s/e could also be contributing as pt on several meds c s/e fatigue including ambien, atarax, gabapentin, klonopin (of note, none of these are new meds)  -     Vitamin B12; Future  -     Folate; Future  -     Vitamin D; Future    History of sudden cardiac arrest  Complete AV block  Paroxysmal atrial fibrillation  Biventricular automatic  implantable cardioverter defibrillator in situ  Nonischemic cardiomyopathy from RV pacing, resolved with upgrade cardiac resynchronization therapy  As per HPI  Cont f/u c Dr. Mejia, no acute issues    Obesity, Class III, BMI 40-49.9 (morbid obesity)  Counseled extensively on need for diet changes and daily exercise.  Recommend at least 30 minutes of exercise at least 5 days a week.      Mixed hyperlipidemia  Lab Results   Component Value Date    LDLCALC 56.6 (L) 09/28/2018     At goal, cont crestor    History of TIA (transient ischemic attack)  On xarelton and plavix based on cardiac hx and hx stroke, cont meds as per Cards    Essential hypertension  at goal.  Cont current medications.    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 Huffman/Etta in psych clinic  Cont f/u and meds as per psych (Abilify, Klonopin, Cymbalta, Atarax and ambien)   -     Vitamin B12; Future  -     Folate; Future  -     Vitamin D; Future    Screen for colon cancer  -     Fecal Immunochemical Test (iFOBT); Future    Prediabetes  Lab Results   Component Value Date    HGBA1C 5.9 (H) 09/28/2018     Pt was counseled on the need to avoid intake of simple sugars and minimize carbohydrates.  Also recommended weight loss and daily exercise.  -     Hemoglobin A1c; Future    Nutritional anemia   -     Vitamin B12; Future  -     Folate; Future    Body mass index (BMI) of 45.0-49.9 in adult   -     Vitamin D; Future    HM as above  RTC in 4 mos for f/u, labs today.    Tiffany Mina MD  Department of Internal Medicine - [facility]  01/28/2019

## 2019-01-29 PROCEDURE — 82274 ASSAY TEST FOR BLOOD FECAL: CPT

## 2019-01-30 ENCOUNTER — TELEPHONE (OUTPATIENT)
Dept: INTERNAL MEDICINE | Facility: CLINIC | Age: 54
End: 2019-01-30

## 2019-01-30 NOTE — TELEPHONE ENCOUNTER
Spoke with pt and she states she have a bad cold that just started, no fever. Pt have congestion and a sore throat. Pt informed that colds are viral and I can add her on tomorrow but she can wait a few days to see if she feels better after taking some otc meds. Pt will wait a few days and pt informed md will call her with lab results tomorrow and if sx are worse to let md know at that time.

## 2019-01-30 NOTE — TELEPHONE ENCOUNTER
----- Message from Raffaele Suarez sent at 1/30/2019  3:11 PM CST -----  Contact: Patient [phone]  Patient calling requesting to speak with office, stating is getting the flu and wants to know if something can be called in or if needs to schedule an appt.?    Would like a call back to inform.    [facility] [id number] - [place], [place] - [address] AT [facility] [address] [phone] (Phone) [phone] (Fax)    Please call an advise  Thank you

## 2019-01-31 ENCOUNTER — TELEPHONE (OUTPATIENT)
Dept: INTERNAL MEDICINE | Facility: CLINIC | Age: 54
End: 2019-01-31

## 2019-01-31 ENCOUNTER — EXTERNAL CHRONIC CARE MANAGEMENT (OUTPATIENT)
Dept: PRIMARY CARE CLINIC | Facility: CLINIC | Age: 54
End: 2019-01-31
Payer: MEDICARE

## 2019-01-31 ENCOUNTER — LAB VISIT (OUTPATIENT)
Dept: LAB | Facility: HOSPITAL | Age: 54
End: 2019-01-31
Attending: INTERNAL MEDICINE
Payer: MEDICARE

## 2019-01-31 DIAGNOSIS — Z12.11 SCREEN FOR COLON CANCER: ICD-10-CM

## 2019-01-31 DIAGNOSIS — R19.5 POSITIVE FIT (FECAL IMMUNOCHEMICAL TEST): Primary | ICD-10-CM

## 2019-01-31 DIAGNOSIS — E55.9 VITAMIN D DEFICIENCY: Primary | ICD-10-CM

## 2019-01-31 LAB — HEMOCCULT STL QL IA: POSITIVE

## 2019-01-31 PROCEDURE — 99490 PR CHRONIC CARE MGMT, 1ST 20 MIN: ICD-10-PCS | Mod: S$PBB,,, | Performed by: PSYCHIATRY & NEUROLOGY

## 2019-01-31 PROCEDURE — 99490 CHRNC CARE MGMT STAFF 1ST 20: CPT | Mod: S$PBB,,, | Performed by: PSYCHIATRY & NEUROLOGY

## 2019-01-31 PROCEDURE — 99490 CHRNC CARE MGMT STAFF 1ST 20: CPT | Mod: PBBFAC | Performed by: PSYCHIATRY & NEUROLOGY

## 2019-01-31 RX ORDER — ERGOCALCIFEROL 1.25 MG/1
50000 CAPSULE ORAL
Qty: 8 CAPSULE | Refills: 0 | Status: SHIPPED | OUTPATIENT
Start: 2019-01-31 | End: 2019-03-22

## 2019-01-31 NOTE — TELEPHONE ENCOUNTER
Pt notified of lab results.  Vit D low and B12 lower limit of normal    Recommend Ergocalciferol for 8 weeks followed by OTC Vit D3 2000 daily.  Also rec otc MVI.  preDM a little worse, Pt was counseled on the need to avoid intake of simple sugars and minimize carbohydrates.  Also recommended weight loss and daily exercise.  Also c hypergammaglobulinemia, will pursue csc first and consider additional testing after that. LDL at goal.    See separate note regarding +FIT.

## 2019-01-31 NOTE — TELEPHONE ENCOUNTER
----- Message from Poppy Blackburn sent at 1/31/2019  3:24 PM CST -----  Contact: self  Pt is returning call from this office.  She can be reached at [phone]

## 2019-01-31 NOTE — TELEPHONE ENCOUNTER
Pt notified of +FIT  Will refer to [facility]  Will likely need Cards to approve procedure and give recs regarding Xarelto and plavix.

## 2019-02-01 ENCOUNTER — TELEPHONE (OUTPATIENT)
Dept: ELECTROPHYSIOLOGY | Facility: CLINIC | Age: 54
End: 2019-02-01

## 2019-02-01 DIAGNOSIS — Z95.810 AICD (AUTOMATIC CARDIOVERTER/DEFIBRILLATOR) PRESENT: Primary | ICD-10-CM

## 2019-02-01 NOTE — TELEPHONE ENCOUNTER
----- Message from Ines Wilder RN sent at 2/1/2019  8:04 AM CST -----  Contact: pt      ----- Message -----  From: Avelino Mejia MD  Sent: 2/1/2019   6:57 AM  To: Ines Wilder RN    Can stop 2 days prior and resume after. Her TIAs were related most likely to hypertension and no afib.    Thanks    Avelino  ----- Message -----  From: Ines Wilder RN  Sent: 1/31/2019   4:49 PM  To: Avelino Mejia MD    Pt needs clearance for colonoscopy and needs to stop Xarelto. Pt has history of  Tia's.  Please advise   ----- Message -----  From: Baylee Hoffmann MA  Sent: 1/31/2019   4:37 PM  To: Kell Woo RN        ----- Message -----  From: Susan Wilson  Sent: 1/31/2019   4:32 PM  To: Roberto ABREU Staff    Pt needs to get a clr for a colonoscopy and to stop her blood thinners.  She can be reached at [phone].    Thank you

## 2019-02-06 ENCOUNTER — TELEPHONE (OUTPATIENT)
Dept: ENDOSCOPY | Facility: HOSPITAL | Age: 54
End: 2019-02-06

## 2019-02-06 ENCOUNTER — OFFICE VISIT (OUTPATIENT)
Dept: SURGERY | Facility: CLINIC | Age: 54
End: 2019-02-06
Payer: MEDICARE

## 2019-02-06 VITALS
SYSTOLIC BLOOD PRESSURE: 169 MMHG | WEIGHT: 287.69 LBS | DIASTOLIC BLOOD PRESSURE: 90 MMHG | HEIGHT: 64 IN | BODY MASS INDEX: 49.11 KG/M2 | HEART RATE: 80 BPM

## 2019-02-06 DIAGNOSIS — R19.5 POSITIVE FIT (FECAL IMMUNOCHEMICAL TEST): Primary | ICD-10-CM

## 2019-02-06 PROCEDURE — 99214 OFFICE O/P EST MOD 30 MIN: CPT | Mod: PBBFAC | Performed by: NURSE PRACTITIONER

## 2019-02-06 PROCEDURE — 99214 PR OFFICE/OUTPT VISIT, EST, LEVL IV, 30-39 MIN: ICD-10-PCS | Mod: S$PBB,,, | Performed by: NURSE PRACTITIONER

## 2019-02-06 PROCEDURE — 99999 PR PBB SHADOW E&M-EST. PATIENT-LVL IV: CPT | Mod: PBBFAC,,, | Performed by: NURSE PRACTITIONER

## 2019-02-06 PROCEDURE — 99999 PR PBB SHADOW E&M-EST. PATIENT-LVL IV: ICD-10-PCS | Mod: PBBFAC,,, | Performed by: NURSE PRACTITIONER

## 2019-02-06 PROCEDURE — 99214 OFFICE O/P EST MOD 30 MIN: CPT | Mod: S$PBB,,, | Performed by: NURSE PRACTITIONER

## 2019-02-06 NOTE — PROGRESS NOTES
Subjective:       Patient ID: Amna Chawla is a 53 y.o. female.    Chief Complaint: No chief complaint on file.    HPI   53 F who presents to clinic for a positive FIT test. She is on plavix and xerolto. She denies blood in stool, abdominal pain, rectal pain, change in bowel habits or unexplained weight loss.     No family history of colon or rectal cancer  2014 Colonoscopy   The perianal and digital rectal examinations were normal.       The transverse colon revealed significantly excessive looping.        Unable to make it to ascending colon, cecum due to body habitus,        looping, and poor prep.       The exam was otherwise without abnormality on direct and        retroflexion views.     Review of Systems   Constitutional: Negative for fatigue, fever and unexpected weight change.   Respiratory: Negative for shortness of breath.    Cardiovascular: Negative for chest pain.   Gastrointestinal: Negative for abdominal distention, abdominal pain, anal bleeding, blood in stool, constipation, diarrhea, nausea, rectal pain and vomiting.       Objective:      Physical Exam   Constitutional: She is oriented to person, place, and time. She appears well-developed and well-nourished. No distress.   Eyes: Conjunctivae and EOM are normal.   Pulmonary/Chest: Effort normal. No respiratory distress.   Abdominal: Soft. She exhibits no distension. There is no tenderness.   Musculoskeletal: Normal range of motion.   Neurological: She is alert and oriented to person, place, and time.   Skin: Skin is warm and dry.   Psychiatric: She has a normal mood and affect. Her behavior is normal.       Assessment:       1. Positive FIT (fecal immunochemical test)        Plan:       Schedule cscope  Needs clearance to stop blood thinners

## 2019-02-06 NOTE — TELEPHONE ENCOUNTER
Patient needs to be scheduled for a colonoscopy. Is it okay for patient to hold Plavix 5 days prior to procedure? Please advise.    Thank you,  Mariel

## 2019-02-06 NOTE — LETTER
February 6, 2019      Tiffany Mina MD  [address]  [place] [address]           [facility]  [address]  [place] [address]  Phone: [phone]          Patient: Amna Chawla   MR Number: [id number]   YOB: 1965   Date of Visit: 2/6/2019       Dear Dr. Sterling:    Thank you for referring Amna Chawla to me for evaluation. Attached you will find relevant portions of my assessment and plan of care.    If you have questions, please do not hesitate to call me. I look forward to following Amna Chawla along with you.    Sincerely,    Lorena Andrews, IDA    Enclosure  CC:  No Recipients    If you would like to receive this communication electronically, please contact [email or web address] or [phone] to request more information on [facility] access.    For providers and/or their staff who would like to refer a patient to [facility], please contact us through our one-stop-shop provider referral line, [facility] , at [phone].    If you feel you have received this communication in error or would no longer like to receive these types of communications, please e-mail [email or web address]

## 2019-02-08 ENCOUNTER — HOME CARE VISIT (OUTPATIENT)
Dept: NEUROLOGY | Facility: HOSPITAL | Age: 54
End: 2019-02-08

## 2019-02-11 ENCOUNTER — CLINICAL SUPPORT (OUTPATIENT)
Dept: CARDIOLOGY | Facility: HOSPITAL | Age: 54
End: 2019-02-11
Attending: INTERNAL MEDICINE
Payer: MEDICARE

## 2019-02-11 DIAGNOSIS — Z95.810 AICD (AUTOMATIC CARDIOVERTER/DEFIBRILLATOR) PRESENT: ICD-10-CM

## 2019-02-11 DIAGNOSIS — Z86.74 HISTORY OF SUDDEN CARDIAC ARREST: ICD-10-CM

## 2019-02-11 DIAGNOSIS — I44.2 COMPLETE AV BLOCK: ICD-10-CM

## 2019-02-11 DIAGNOSIS — I45.81 PROLONGED QT SYNDROME: ICD-10-CM

## 2019-02-11 DIAGNOSIS — G45.9 TIA (TRANSIENT ISCHEMIC ATTACK): ICD-10-CM

## 2019-02-11 PROCEDURE — 93296 REM INTERROG EVL PM/IDS: CPT

## 2019-02-14 ENCOUNTER — TELEPHONE (OUTPATIENT)
Dept: PHARMACY | Facility: CLINIC | Age: 54
End: 2019-02-14

## 2019-02-21 DIAGNOSIS — M79.641 BILATERAL HAND PAIN: Primary | ICD-10-CM

## 2019-02-21 DIAGNOSIS — M79.642 BILATERAL HAND PAIN: Primary | ICD-10-CM

## 2019-02-22 ENCOUNTER — HOSPITAL ENCOUNTER (OUTPATIENT)
Dept: RADIOLOGY | Facility: OTHER | Age: 54
Discharge: HOME | End: 2019-02-22
Attending: PHYSICIAN ASSISTANT
Payer: MEDICARE

## 2019-02-22 ENCOUNTER — OFFICE VISIT (OUTPATIENT)
Dept: ORTHOPEDICS | Facility: CLINIC | Age: 54
End: 2019-02-22
Payer: MEDICARE

## 2019-02-22 VITALS
HEART RATE: 67 BPM | SYSTOLIC BLOOD PRESSURE: 138 MMHG | WEIGHT: 287 LBS | DIASTOLIC BLOOD PRESSURE: 80 MMHG | HEIGHT: 64 IN | BODY MASS INDEX: 49 KG/M2

## 2019-02-22 DIAGNOSIS — M79.642 BILATERAL HAND PAIN: ICD-10-CM

## 2019-02-22 DIAGNOSIS — M79.641 BILATERAL HAND PAIN: ICD-10-CM

## 2019-02-22 DIAGNOSIS — M65.331 ACQUIRED TRIGGER FINGER OF RIGHT MIDDLE FINGER: ICD-10-CM

## 2019-02-22 DIAGNOSIS — M65.332 ACQUIRED TRIGGER FINGER OF LEFT MIDDLE FINGER: ICD-10-CM

## 2019-02-22 PROCEDURE — 73130 X-RAY EXAM OF HAND: CPT | Mod: 26,50,, | Performed by: RADIOLOGY

## 2019-02-22 PROCEDURE — 99213 OFFICE O/P EST LOW 20 MIN: CPT | Mod: 25,S$PBB,, | Performed by: PLASTIC SURGERY

## 2019-02-22 PROCEDURE — 99999 PR PBB SHADOW E&M-EST. PATIENT-LVL III: ICD-10-PCS | Mod: PBBFAC,,, | Performed by: PLASTIC SURGERY

## 2019-02-22 PROCEDURE — 99999 PR PBB SHADOW E&M-EST. PATIENT-LVL III: CPT | Mod: PBBFAC,,, | Performed by: PLASTIC SURGERY

## 2019-02-22 PROCEDURE — 20550 NJX 1 TENDON SHEATH/LIGAMENT: CPT | Mod: F2,S$PBB,, | Performed by: PLASTIC SURGERY

## 2019-02-22 PROCEDURE — 20550 PR INJECT TENDON SHEATH/LIGAMENT: ICD-10-PCS | Mod: 51,F7,S$PBB, | Performed by: PLASTIC SURGERY

## 2019-02-22 PROCEDURE — 73130 XR HAND COMPLETE 3 VIEWS BILATERAL: ICD-10-PCS | Mod: 26,50,, | Performed by: RADIOLOGY

## 2019-02-22 PROCEDURE — 20550 NJX 1 TENDON SHEATH/LIGAMENT: CPT | Mod: PBBFAC,F7

## 2019-02-22 PROCEDURE — 99213 PR OFFICE/OUTPT VISIT, EST, LEVL III, 20-29 MIN: ICD-10-PCS | Mod: 25,S$PBB,, | Performed by: PLASTIC SURGERY

## 2019-02-22 PROCEDURE — 20550 NJX 1 TENDON SHEATH/LIGAMENT: CPT | Mod: PBBFAC | Performed by: PLASTIC SURGERY

## 2019-02-22 PROCEDURE — 73130 X-RAY EXAM OF HAND: CPT | Mod: 50,TC,FY

## 2019-02-22 PROCEDURE — 99213 OFFICE O/P EST LOW 20 MIN: CPT | Mod: PBBFAC,25 | Performed by: PLASTIC SURGERY

## 2019-02-22 RX ORDER — TRIAMCINOLONE ACETONIDE 40 MG/ML
80 INJECTION, SUSPENSION INTRA-ARTICULAR; INTRAMUSCULAR
Status: COMPLETED | OUTPATIENT
Start: 2019-02-22 | End: 2019-02-22

## 2019-02-22 RX ADMIN — TRIAMCINOLONE ACETONIDE 80 MG: 40 INJECTION, SUSPENSION INTRA-ARTICULAR; INTRAMUSCULAR at 02:02

## 2019-02-22 NOTE — PROGRESS NOTES
"HISTORY OF PRESENT ILLNESS:  Mrs. Chawla returns to clinic today complaining of   bilateral middle finger triggering and pain.  She also complains of swelling in   both hands.  She states that this has been going on for several weeks.  She   denies any numbness or tingling.  She has no other complaints.  No recent   history of trauma.    PHYSICAL EXAMINATION:  VITAL SIGNS:  /80   Pulse 67   Ht 5' 4" (1.626 m)   Wt 130.2 kg (287 lb)   BMI 49.26 kg/m²     GENERAL:  No acute distress, alert and oriented x3, cooperative, well nourished.  LUNGS:  Unlabored on room air.  CARDIOVASCULAR:  Distal pulses intact.  Good capillary refill.  No clubbing,   cyanosis or edema.  RIGHT UPPER EXTREMITY:  Well-healed carpal tunnel incision.  There is tenderness   over the A1 pulley at the base of the middle finger with active triggering.    She has normal sensation in the median, radial and ulnar nerve distributions.    She is able to make a composite fist.  LEFT UPPER EXTREMITY:  Well-healed carpal tunnel incision, tenderness over the   A1 pulley at the base of the middle finger.  There is active triggering.  She is   able to make a composite fist.    RADIOLOGY IMPRESSION:  .  EXAMINATION:  XR HAND COMPLETE 3 VIEWS BILATERAL    CLINICAL HISTORY:  . Pain in right hand    TECHNIQUE:  PA, lateral, and oblique views of both hands were performed.    COMPARISON:  06/08/2018    FINDINGS:  Right hand: Mild loss of proximal and distal interphalangeal joint space.  Normal mineralization.  No osseous erosions.  No fracture are osseous destructive process.  Ulnar positive variance present.  Soft tissues normal.    Left hand: Mild loss of joint spaces in the proximal and distal interphalangeal joint.  Ulnar positive variance.  No osseous erosions, fracture or osseous destructive process.  Normal soft tissues.      Impression       1. 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  2. " Bilateral ulnar positive variance.  3. Otherwise normal exam.         PROCEDURE:    I have explained the risks, benefits, and alternatives of the procedure in detail.  The patient voices understanding and all questions have been answered. So after I performed a sterile prep of the skin, the level of there A-1 pulley of the bilateral long finger is injected using a 25 gauge needle from the volar approach with a  combination of 1cc 1% plain Lidocaine and 40 mg of Kenalog.  The patient is cautioned and immediate relief of pain is secondary to the local anesthetic and will be temporary.  After the anesthetic wears off there may be a increase in pain that may last for a few hours or a few days and they should use ice to help alleviate this flair up of pain.         ASSESSMENT:  Bilateral middle finger trigger digit.    PLAN:  I discussed the pathophysiology, progression, and treatment of trigger   digits with the patient in detail.  She was offered a corticosteroid injection   to the carpal tunnel to help alleviate her symptoms.  I discussed that if her   symptoms fail to improve or worsen or she has any other questions or concerns,   she may follow up as needed.      GUERO/RICHA  dd: 02/22/2019 14:32:41 (CST)  td: 02/23/2019 06:01:43 (CST)  Doc ID   #[id number]  Job ID #[id number]    CC:       Dictation Confirmation Code: [id number]  Matt Pugh Jr. MD  02/22/2019  2:30 PM

## 2019-02-25 NOTE — TELEPHONE ENCOUNTER
Please see message below and advise regarding patient holding Plavix as soon as possible. Endoscopy order was placed in October.    Thank you,  Mariel

## 2019-02-26 VITALS
SYSTOLIC BLOOD PRESSURE: 132 MMHG | DIASTOLIC BLOOD PRESSURE: 80 MMHG | HEART RATE: 78 BPM | RESPIRATION RATE: 20 BRPM | OXYGEN SATURATION: 97 %

## 2019-02-26 NOTE — PROGRESS NOTES
"AAOx3.  Lives w/ family but visit occurred alone.  NIHSS-0; does not smoke; refrains from adding salt but does have salty and fast food occasionally; walks for exercise; compliant w/ all meds. Complains of feeling "tired" and has appt this afternoon re: her anemnia.  Education provided on goal of stroke mobile, s/s of stroke; diet, exercise, medications. Verbalized understanding of all instructions provided.  Encouraged to utilize stroke team for any concern.    "

## 2019-02-28 ENCOUNTER — EXTERNAL CHRONIC CARE MANAGEMENT (OUTPATIENT)
Dept: PRIMARY CARE CLINIC | Facility: CLINIC | Age: 54
End: 2019-02-28
Payer: MEDICARE

## 2019-02-28 PROCEDURE — 99490 PR CHRONIC CARE MGMT, 1ST 20 MIN: ICD-10-PCS | Mod: S$PBB,,, | Performed by: PSYCHIATRY & NEUROLOGY

## 2019-02-28 PROCEDURE — 99490 CHRNC CARE MGMT STAFF 1ST 20: CPT | Mod: PBBFAC | Performed by: PSYCHIATRY & NEUROLOGY

## 2019-02-28 PROCEDURE — 99490 CHRNC CARE MGMT STAFF 1ST 20: CPT | Mod: S$PBB,,, | Performed by: PSYCHIATRY & NEUROLOGY

## 2019-03-01 DIAGNOSIS — E78.5 HYPERLIPIDEMIA, UNSPECIFIED HYPERLIPIDEMIA TYPE: Primary | ICD-10-CM

## 2019-03-04 ENCOUNTER — TELEPHONE (OUTPATIENT)
Dept: NEUROLOGY | Facility: CLINIC | Age: 54
End: 2019-03-04

## 2019-03-04 ENCOUNTER — TELEPHONE (OUTPATIENT)
Dept: CARDIOLOGY | Facility: HOSPITAL | Age: 54
End: 2019-03-04

## 2019-03-04 NOTE — TELEPHONE ENCOUNTER
Patient contacted the [facility] on this afternoon with c/o feeling like her heart is beating fast periodically on Thursday (2/28/19) and yesterday (3/3/19).  Patient did not measure her HR manually or with a digital monitor.  Informed the patient the Device RN reviewed the remote transmission report and it reveals that her HR has not gone above 100 bpm and that there were no recorded arrhythmias.  Patient is asymptomatic otherwise.  Patient was instructed to contact the clinic with any additional questions and/or concerns.  Understanding verbalized.

## 2019-03-05 NOTE — TELEPHONE ENCOUNTER
----- Message from Laina Nair RN sent at 3/1/2019  8:44 AM CST -----  Contact: self @ [phone]      ----- Message -----  From: Cici Hill  Sent: 3/1/2019   8:42 AM  To: Dana Hernandez Staff    Pt is calling to schedule a botox appt.  Pt says her last paper work says 3-8-19 but there isn't an actual appt scheduled.  Pls call.

## 2019-03-06 DIAGNOSIS — G43.711 CHRONIC MIGRAINE WITHOUT AURA, WITH INTRACTABLE MIGRAINE, SO STATED, WITH STATUS MIGRAINOSUS: Primary | ICD-10-CM

## 2019-03-08 ENCOUNTER — TELEPHONE (OUTPATIENT)
Dept: ENDOSCOPY | Facility: HOSPITAL | Age: 54
End: 2019-03-08

## 2019-03-08 RX ORDER — TIAGABINE HYDROCHLORIDE 4 MG/1
TABLET, FILM COATED ORAL
Qty: 30 TABLET | Refills: 0 | Status: CANCELLED | OUTPATIENT
Start: 2019-03-08

## 2019-03-08 NOTE — TELEPHONE ENCOUNTER
Spoke with Dr. Bustamante regarding patient's follow up colonoscopy, as he last scoped her in 2014. After reading report, he suggested patient have a balloon colonoscopy with Dr. Coffey. Contacted patient to update her on the status of the procedure. Explained we are still waiting for clearance for Xayenito from Dr. Ruth. Patient stated she would contact office to try and expedite response. I informed patient once we have a response, we will contact her to schedule. Patient verbalized understanding and has no questions at this time.

## 2019-03-13 ENCOUNTER — TELEPHONE (OUTPATIENT)
Dept: ENDOSCOPY | Facility: HOSPITAL | Age: 54
End: 2019-03-13

## 2019-03-13 ENCOUNTER — PROCEDURE VISIT (OUTPATIENT)
Dept: NEUROLOGY | Facility: CLINIC | Age: 54
End: 2019-03-13
Payer: MEDICARE

## 2019-03-13 VITALS
BODY MASS INDEX: 48.93 KG/M2 | HEART RATE: 71 BPM | SYSTOLIC BLOOD PRESSURE: 118 MMHG | HEIGHT: 64 IN | WEIGHT: 286.63 LBS | DIASTOLIC BLOOD PRESSURE: 76 MMHG

## 2019-03-13 DIAGNOSIS — G43.711 CHRONIC MIGRAINE WITHOUT AURA, WITH INTRACTABLE MIGRAINE, SO STATED, WITH STATUS MIGRAINOSUS: ICD-10-CM

## 2019-03-13 DIAGNOSIS — G47.33 OSA (OBSTRUCTIVE SLEEP APNEA): Primary | ICD-10-CM

## 2019-03-13 DIAGNOSIS — M54.81 BILATERAL OCCIPITAL NEURALGIA: ICD-10-CM

## 2019-03-13 PROCEDURE — 64615 CHEMODENERV MUSC MIGRAINE: CPT | Mod: S$PBB,,, | Performed by: PSYCHIATRY & NEUROLOGY

## 2019-03-13 PROCEDURE — 64615 PR CHEMODENERVATION OF MUSCLE FOR CHRONIC MIGRAINE: ICD-10-PCS | Mod: S$PBB,,, | Performed by: PSYCHIATRY & NEUROLOGY

## 2019-03-13 PROCEDURE — 64615 CHEMODENERV MUSC MIGRAINE: CPT | Mod: PBBFAC | Performed by: PSYCHIATRY & NEUROLOGY

## 2019-03-13 RX ORDER — TIZANIDINE 4 MG/1
4 TABLET ORAL 2 TIMES DAILY PRN
Qty: 30 TABLET | Refills: 12 | Status: SHIPPED | OUTPATIENT
Start: 2019-03-13 | End: 2019-04-12

## 2019-03-13 RX ADMIN — ONABOTULINUMTOXINA 200 UNITS: 100 INJECTION, POWDER, LYOPHILIZED, FOR SOLUTION INTRADERMAL; INTRAMUSCULAR at 03:03

## 2019-03-13 NOTE — TELEPHONE ENCOUNTER
Dr. Ruth,     Please see below. Patient needs to be scheduled for a colonoscopy. Is it okay for patient to hold Plavix 5 days prior to procedure? Please advise.    Thank you            Toby Paredes MD   to Sandi Fowler MA           12:47 PM    Need Dr. Ruth to make this determination - she is her prescribing stroke doc.  Can you help me get an answer from her?   February 25, 2019              12:56 PM   Sofia Corey MA routed this conversation to Perlita Ruth MD               11:26 AM   Mariel Jasso RN routed this conversation to MD Chin Richards Staff  MD Chin Jay MD Melissa Plauche, RN          11:23 AM   Note      Please see message below and advise regarding patient holding Plavix as soon as possible. Endoscopy order was placed in October.     Thank you,  Mariel      February 6, 2019              2:25 PM   Mariel Jasso, RN routed this conversation to MD Mariel Richards, RODRIGO          2:24 PM   Note      Patient needs to be scheduled for a colonoscopy. Is it okay for patient to hold Plavix 5 days prior to procedure? Please advise.     Thank you,  Mariel

## 2019-03-13 NOTE — PROCEDURES
Follow up:   HA overall better in Hz and intensity     Prior note:   HA started 12/24   She feels BOTOX wore off last week   Reports GI distress from taking to many motrin      Prior note:   4/week HA - L vertex   Jabs - vertex either sides      She reports migraine that woke her up in the morning - associated with L side facial droop      Prior note:   She gets acceptable relief for 2.5 months after BOTOX      Prior note:   No recurrent stroke symptoms   starting having whole body tremors since this AM. Took 1 tablet of lorazepam   Last night had a HA, took trazodone       Admit Date: 4/11/2018  2:03 PM   Discharge Date and Time: 4/12/2018  8:30 PM   History of Present Illness: Ms. Chawla is a 51 yo F with afib on Eliquis (last dose this am), prior cardiac arrest with associated acute ischemic stroke with residual mild L sided weakness, CAD with ICD in situ, HTN, and HLD who presented with acute R sided weakness and sensation changes.  She originally called EMS for palpitations, but developed acute right sided facial droop and RUE weakness at 1:40pm today, after EMS had arrived.  She denies changes to speech or vision.  SBP en route 200/100.  She is ineligible for tPA 2/2 Eliquis use.  She is not suspected to have an LVO.  She will be admitted to [facility] for observation overnight.     Hospital Course (synopsis of major diagnoses, care, treatment, and services provided during the course of the hospital stay): Ms. Chawla was admitted for acute stroke workup. Pt with pacemaker so unable to obtain MRI Brain; CTA H/N demonstrated no evidence acute infarction, though did exhibit noncalcified plaquing of carotid bifurcations and proximal ICAs with < 50% stenosis, so Plavix was added to pt's daily home regimen. Concerned for TIA at this time though Migraine very high on differential due to pt's hx headaches, including presently this admission. Hypertensive urgency/emergency also considered due to pt's very elevated levels  with EMS. Per chart review, Eliquis was a new medication since 4/3/18 (had switched from Coumadin), however staff Faraz concerned that pt had a potential event while on Eliquis. She wished to switch to Pradaxa as an alternative DOAC (as it has an option for reversal), however changed to Xarelto per pt's insurance coverage.   While admitted, pt given cocktail for HA which she has received previously at the infusion clinic - IV Magnesium, IM Toradol, 2mg IV Morphine, 500cc NS bolus (IV Benadryl not included this time as pt had received a dose earlier that day prior to contrast imaging.) HA improved somewhat and pt was encouraged to follow up with Dr. Cortez for continued management of botox injections, etc. At that time, pt also found with hyponatremia; d/c'd Clorthalidone and plan was made for pt to [facility] 4[facility]16[facility]18 for repeat CMP to evaluate Na level and BP check since discontinuing this medication.  Ms. Chawla was evaluated and treated by PT, OT, and SLP who recommend d/c with outpatient PT and OT. She was discharged home 4/12/18 with [facility] follow-up Monday      Prior note:   2 weeks ago BOTOX effect wore off   Used up all her percocet   3 wks h/o:   Reports frequent falls - falling forward, no LOC, no injuries, able to protect falls by hands   triggers - unknown   Also occ stumbling   Dragging L foot   No Pain in back or legs   No numbness or weakness in legs   No B/B incontinence   No lightheadedness      Prior note:    Flare up of TMJ and HA during daughter's wedding   NSAIDS helped   2 weeks ago BOTOX effect wore off      Prior note:   2 weeks ago BOTOX effect wore off   Has severe spasm and pain in the traps catalina   Weather change has provoked severe migraine + nausea   She started coumadin       Prior note:   3 weeks ago BOTOX effect wore off   She reports severe daily HA    During BOTOX periods she feels she  her HA. Much less intense      Prior note:   The  patient is a 51-year-old female who [facility]. Since her last visit, she reports growing frustration   about the fact that nothing has helped her with regards to her headaches. She   continues to experience daily headaches. She takes mefenamic acid and   tizanidine every night, which only provides her two hours of relief. She is   unable to sleep because of the refractory headaches. She is incapacitated   because of severe headaches. She reports minimal relief with infusions that she  gets on a periodic basis. She does report an improvement overall with the   Botox. However, this effect has worn off in the last two weeks. She also   reports an episode wherein she fell with loss of consciousness, which was not   provoked. As a result, she injured her ankle and is currently wearing a boot.      Prior note:   since last week - Reports vertigo for 6 - 7 minutes upto 3 times daily   Nearly daily severe HA - no response to ponstel , compazine   She is late for her BOTOX - last 2 weeks were painful       Follow up:   R sided Headache is constant max at TMJ on R   Prior note:   She reports new episodes of R face tingling. Sh also reports 2 episodes of blurred vision lasting 15 minutes. These hapended while watching TV. Her pain remains unchanged   Follow up:   2 days of severe HA during Thanksgiving   Pounding bifrontal region   Pain worse over L eye brow   Follow up:   Reports bifrontal severe HA (worse on L) with no nausea with sudden onset . Occurs daily   NO note:  I found no papilledema or other changes to suggest elevated intracranial pressure or other alternative etiology for her headaches. Repeat exam in two years.  HA are less frequent and less intense.   (R levator scapula spasm)   symptoms better with lateral flexion to L   Prior note:   She still has daily HA with severe sharp stabbing pain behind L eye lasting for 15 sec   Prior note:   Daily pain. 2/week -  "nausea.  Shu Lares, RN at 9/17/2014 4:53 PM  Pt presented to clinic for medical advice. Pt is seen by Dr. Paredes and Dr. Cortez.  1) She went ER on 9/13/14 for a migraine. She was given Reglan, Benadryl, MSO4 and IVF. A couple days later she developed an all over body "tremor". She states "I think I have Parkinson's". - Per Dr. Paredes, medication related tremor (Reglan & Benadryl). Informed her it should wear off in a few days. If not, she is to call and let us know.  2) Headaches - triggered by insomnia.   Current meds:  Ketoprofen - she took it once and said her "throat closed up" and she's not supposed to have anything with aspirin in it.  Nortriptyline - she was taking it at night, but it didn't help her sleep, so she stopped it.  Per Dr. Cortez, discontinue Ketoprofen and Nortriptyline. Start Effexor XR 37.5mg QD, tizanidine 4mg BID PRN headache and Klonopin 0.25 - 0.5mg QHS PRN. Will schedule pt for sphenocath procedure within the next week.   Prior note:   46 yo woman with history of HTN and asthma, both reportedly controlled, in hospital early 2013 after "passed out" and became unresponsive. CPR in the field for 8 minutes until EMS arrived. Per ED note, on arrival she was found to be in PEA, given epinephrine. Vfib/Vtach was achieved which was shocked x1. Patient converted to sinus tachycardia after shock. I do not know the time course for the above treatment. On arrival to facility patient was in sinus tachycardia with strong pulses, intubated and unresponsive. ET tube placement was confirmed with direct laryngoscopy and good bilateral breath sounds were heard after the tube was pulled back 2 cm. Reported to have "withdrawal" movements in ER during stabilization, then sedated and cooling protocol initiated. Had remarkable   recovery and first seen in clinic in April 2013.   Headache history: cluster   Age of onset - [age]   Location - behind L eye   Nature of pain - sharp   Prodrome - no   Aura - No " "  Duration of headache - 6-7 min   Time to peak intensity -   Pain scale - range of intensity - 9/10   Frequency - daily   Status Migrainosus history - 1-3 days   Time of day of most headaches- anytime   Associated symptoms with the headache:   Red eyes   swelling of L face   Headache history: Migraine   Age of onset -    Location - bifrontal   Nature of pain - Pounding   Prodrome - no   Aura - No   Duration of headache - > 4 hrs   Time to peak intensity -   Pain scale - range of intensity - 9/10   Frequency - 5/week   Status Migrainosus history - 1-3 days   Time of day of most headaches- anytime   Associated symptoms with the headache:   Meningeal symptoms - photophobia, phonophobia, exercise intolerance +   Nausea/vomitting +   Nasal drainage   Visual blurriness +   Pallor/flushing   Dizziness   Vertigo   Confusion   Impaired concentration +   Pain worsened with physical activity +   Neck pain +   Symptoms of increased intracranial pressure:   Whooshing sounds - no   Visual spots/blotches - no   Headache Triggers: unknown   Other comorbid conditions:   Anxiety - no   Motion sickness symptom - no       Treatment history:   Resolution of headache with sleep - yes       Meds tried:   Trigger points involvin/9/15 helped for 1 day   Site: Right   Levator scapula   Pharmacological agent:   0.5% Sensorcaine solution   No complications were noted.  Supraorbital block - helped for 2 days   Tylenol - not help   imitrex - chest tightness   alleve - help   topamax - not helping   Neurontin - not helping   Paxil, Elavil - not help   seroquel - nightmare   Ketoprofen - she took it once and said her "throat closed up" and she's not supposed to have anything with aspirin in it.  Nortriptyline - she was taking it at night, but it didn't help her sleep, so she stopped it.  reglan - parkinsonism   zanaflex - help   Toradol 30 mg IM inj at home - too expensive   BOTOX round #1 9/3/15 - helped (R levator scapula spasm)   Round " #2 12/3/15 - helped   Round#3 3/316 - helped   Round #4 6/1/16 - helped   BOTOX#5 9/15/16 - helped     BOTOX#6 - 11/30/16 - helped   BOTOX#7 - 3/9/17 - helped    BOTOX#8 - 5/25/17 - helped    BOTOX#9 8/10/17 - helped   BOTOX#10 10/19/17 - helped   BOTOX#11 12/27/17 - helped   BOTOX#12 3/7/18 - helped   BOTOX#13 5/16/18 - helped    BOTOX#14 8/1/18 - helped     BOTOX#15 10/19/18 - helped      BOTOX#16 12/28/18 - helped     AIMOVIG (sept 1rst week, Oct first week, Nov first wk 140 mg, Dec first wk 140 mg, Jan, Feb) no benefit   Constipation +     Can not do RFA - pt has pacemaker   Procedure: IOVERA peripheral nerve focus cold therapy (non ablative cryotherapy) 12/30/15 - not help   Indication: Cryotherapy of select peripheral nerves of the head was performed to treat chronic headaches that failed to respond to several preventive pharmacological therapies.   Sedation: None   Informed consent: The procedure, risks, benefits and options were discussed with the patient. There are no contraindications to the procedure. The patient expressed understanding and agreed to the procedure. I verify that I personally obtained the patient's consent prior to the start of the procedure.  Location:  Bilateral Supra orbital nerve (3 cycles on R and 1 cycle on L)   Bilateral Occipital nerve   3 cycles   Pain relief: Pain score reduced 10/10->0/10   9/26 Bilateral Sphenopalatine Ganglion and bilateral Maxillary Branch (Trigeminal Nerve) Block - no help   ONB - not help                                                                                                     Shannon Pagan, RN at 12/31/2014 3:54 PM            Status: Signed                        Expand All Collapse All   HEADACHE FASTTRACK  PAIN 7/10 NAUSEA 0/10  ROUND 1: TORADOL, IMITREX, MORPHINE, BENADRYL, AND MAGNESIUM  PAIN 7/10 NAUSEA 0/10  PT STATED SHE WAS READY TO GO HOME AND GO TO SLEEP. PT D/C'ED IN [facility]               Shannon Pagan RN at 10/5/2015 12:49 PM             Status: Signed                        Expand All Collapse All   HEADACHE FASTTRACK  PAIN 8/10 NAUSEA 10/10  FIRST ROUND: tORADOL, BENADRYL, COMPAZINE, IMITREX, MAGNESIUM, AND VALPROATE.  PAIN 1/10 NAUSEA 0/10  PT READY TO GO HOME AND FEELING BETTER. PT LEFT IN NAD WITH FAMILY MEMBER  TOTAL TIME: 7626-8701          Shannon Pagan RN at 10/20/2015 3:05 PM            Status: Signed                        Expand All Collapse All   HEADACHE FASTTRACK  PAIN 10/10 NAUSEA 0/10  FIRST ROUND: tORADOL, BENADRYL, COMPAZINE, IMITREX, MAGNESIUM, AND VALPROATE.  BP BEING MONITORED EVERY 15 MIN AND REMAINED 170'S/80-90'S. DR CHOPRA NOTIFIED AND STATED TO MAKE SURE BP DID NOT EXCEED 180 SYSTOLIC OR PT SHOULD REPORT TO ED. BP AT 1500 183/92. DR CHOPRA NOTIFIED AND GAVE ORDER TO STOP INFUSION AND SEND PATIENT TO ED. PT BROUGHT TO ED BY INFUSION NURSE VIA WHEELCHAIR.   PAIN 8/10 NAUSEA 0/10  TOTAL TIME: 7454-5466                       Progress Notes                                Shannon Pagan RN at 2/24/2016 1:36 PM       Status: Signed                  Expand All Collapse All   HEADACHE FASTTRACK  PAIN 10/10 NAUSEA 7/10  FIRST ROUND: tORADOL, BENADRYL, AND MORPHINE-BP RANGING FROM 177-203/84-92, HR 60-82  MD NOTIFIED AND GAVE ORDERS TO SEND TO ED. PT LEFT VIA W/C WITH INFUSION NURSE ON WAY TO ED.            Headache risk factors:   H/o TBI - [facility] (2013) + neck sprain   H/o Meningitis - no   F/h Aneurysms - no       Review of Systems   Constitutional: Negative.   HENT: Negative.   Eyes: Negative.   Respiratory: Negative.   Cardiovascular: Negative.   Gastrointestinal: Negative.   Endocrine: Negative.   Genitourinary: Negative.   Musculoskeletal: Negative.   Skin: Negative.   Allergic/Immunologic: Negative.   Hematological: Negative.   Psychiatric/Behavioral: insomnia      Objective:     Physical Exam   Constitutional: She is oriented to person, place, and time. She appears well-developed.   obesity   Psychiatric: She has a normal mood and  flat affect. Her behavior is normal. Judgment and thought content normal.   Headache Exam:  Optic disc is flat OU   Temples appear symmetric  No V1,V2,V3 distribution allodynia/hyperalgesia catalina   No facial swelling  No gross TMJ abnormalities   No ptosis   No conj injection   No meningismus   No neck stiffness  No C spine tenderness  Cervical facet tenderness on palpation catalina   No tender points over trapezium   catalina supraorbital nerve exit point tenderness  catalina occipital nerve exit point tenderness  No auriculotemporal nerve tenderness   Tenderness of levator scapula catalina      Gait - wide based gait   Tremor in hands, legs, voice and neck                    Assessment:      1.  Occipital neuralgia      2.  Cervicalgia      3.  4  5  6 Chronic migraine without aura, with intractable migraine, so stated, with status migrainosus (post traumatic) post concussive?  Depression   TMJ - R sided   Insomnia - SIHRA      Head CT  Findings: There is no evidence of acute or recent major vascular territory cerebral infarction, parenchymal hemorrhage, or intra-axial mass.  There are no extra-axial masses or abnormal fluid collections.  The ventricular system is within normal limits of size for age and shows no distortion by mass-effect or evidence of hydrocephalus.  There is no fracture or destructive osseous lesion.  The extracranial soft tissues are unremarkable.  The visualized paranasal sinuses and mastoid air cells are clear.   Impression    No acute intracranial abnormality.  ______________________________________     Electronically signed by resident: KRISSY MAYERS MD  Date: 03/14/16  Time: 17:09            Results for PUSHPA KEY (MRN [id number]) as of 9/3/2015 14:26     Ref. Range  7/20/2015 11:06  8/19/2015 14:15    Vitamin B-12  Latest Range: 210-950 pg/mL  383       Retinol, serum  Latest Range:  ug/dL     52    Vitamin B2   Latest Range: 1-19 mcg/L      2     Vitamin B6   Latest Range: 5-50 ug/L      4 (L)      Vit D, 25-Hydroxy   Latest Range: 30-96 ng/mL   13 (L)           Plan:      1. Prophylactic medication -   Despiramine 25 mg AM (for dizziness)   Cymbalta 120 mg daily   Aricept 20 mg daily   Neurontin 900 mg TID    Magnesium 200 mg daily  Topamax 200 mg BID   Cont B2, B6 supplements   2, Breakthrough headache - zanaflex 8 mg  PRN percocet Q=30  Multiple alternative treatment options were offered to the patient   3. Refrain from over counter pain medications. Discussed medication overuse headache.cont Magnesium 400 mg PO BID   4. Occipital neuralgia - If medical management is ineffective may consider occipital nerve blocks.   5. I again urged the patient to keep a headache calender.    f/up Dr. Rock for TBI    B12 injection - 5/25/17, 12/28/18   f/up sleep clinic - CPAP pending     Start AJOVY, discussed side effects      BOTOX was performed as an indicated therapy for intractable chronic migraine headaches given that the patient failed > 5 prophylactic medications  Botulinum Toxin Injection Procedure   Pre-operative diagnosis: Chronic migraine   Post-operative diagnosis: Same   Procedure: Chemical neurolysis   After risks and benefits were explained including bleeding, infection, worsening of pain, damage to the areas being injected, weakness of muscles, loss of muscle control, dysphagia if injecting the head or neck, facial droop if injecting the facial area, painful injection, allergic or other reaction to the medications being injected, and the failure of the procedure to help the problem, a signed consent was obtained.   The patient was placed in a comfortable area and the sites to be treated were identified.The area to be treated was prepped three times with alcohol and the alcohol allowed to dry. Next, a 30 gauge needle was used to inject the medication in the area to be treated.   Area(s) injected:   Total Botox used: 165 Units   Botox wastage: 35 Units   Injection sites:    muscle bilaterally  ( a total of 10 units divided into 2 sites)   Procerus muscle (5 units)   Frontalis muscle bilaterally (a total of 20 units divided into 4 sites)   Temporalis muscle bilaterally (a total of 40 units divided into 8 sites)   Occipitalis muscle bilaterally (a total of 30 units divided into 6 sites)   Cervical paraspinal muscles (a total of 20 units divided into 4 sites)   Trapezius muscle bilaterally (a total of 30 units divided into 6 sites)   Masseter 5 units catalina      Complications: none       RTC for the next Botox injection: 10 weeks

## 2019-03-14 ENCOUNTER — TELEPHONE (OUTPATIENT)
Dept: ENDOSCOPY | Facility: HOSPITAL | Age: 54
End: 2019-03-14

## 2019-03-14 NOTE — TELEPHONE ENCOUNTER
MD Jeanette Richards, RN   Caller: Unspecified (Yesterday,  1:24 PM)             Yes. SHa can take ASA 81 mg for 5 days instead    Previous Messages            Patient Calls      Perlita Ruth MD   You 14 minutes ago (8:14 AM)      Yes. SHa can take ASA 81 mg for 5 days instead    Routing Comment        You routed conversation to Perlita Ruth MD; Faraz Perlita Staff 19 hours ago (1:27 PM)      You 19 hours ago (1:24 PM)         Dr. Ruth,      Please see below. Patient needs to be scheduled for a colonoscopy. Is it okay for patient to hold Plavix 5 days prior to procedure? Please advise.     Thank you                 Toby Paredes MD   to Sandi Fowler MA           12:47 PM    Need Dr. Ruth to make this determination - she is her prescribing stroke doc.  Can you help me get an answer from her?   February 25, 2019               12:56 PM   Sofia Corey MA routed this conversation to Perlita Ruth MD                11:26 AM   Mariel Jasso RN routed this conversation to MD Chin Richards [facility] Staff  MD Chin Jay MD Melissa Plauche, RN           11:23 AM   Note      Please see message below and advise regarding patient holding Plavix as soon as possible. Endoscopy order was placed in October.     Thank you,  Mariel      February 6, 2019               2:25 PM   Mariel Jasso, RODRIGO routed this conversation to MD Mariel Richards RN           2:24 PM   Note      Patient needs to be scheduled for a colonoscopy. Is it okay for patient to hold Plavix 5 days prior to procedure? Please advise.     Thank you,  Mariel Bradshaw

## 2019-03-15 ENCOUNTER — TELEPHONE (OUTPATIENT)
Dept: PHARMACY | Facility: CLINIC | Age: 54
End: 2019-03-15

## 2019-03-18 ENCOUNTER — TELEPHONE (OUTPATIENT)
Dept: ENDOSCOPY | Facility: HOSPITAL | Age: 54
End: 2019-03-18

## 2019-03-18 DIAGNOSIS — Z12.11 SPECIAL SCREENING FOR MALIGNANT NEOPLASMS, COLON: Primary | ICD-10-CM

## 2019-03-18 RX ORDER — CLOPIDOGREL BISULFATE 75 MG/1
TABLET ORAL
Qty: 90 TABLET | Refills: 0 | Status: SHIPPED | OUTPATIENT
Start: 2019-03-18 | End: 2019-03-26 | Stop reason: SDUPTHER

## 2019-03-18 RX ORDER — POLYETHYLENE GLYCOL 3350, SODIUM SULFATE ANHYDROUS, SODIUM BICARBONATE, SODIUM CHLORIDE, POTASSIUM CHLORIDE 236; 22.74; 6.74; 5.86; 2.97 G/4L; G/4L; G/4L; G/4L; G/4L
4 POWDER, FOR SOLUTION ORAL ONCE
Qty: 4000 ML | Refills: 0 | Status: SHIPPED | OUTPATIENT
Start: 2019-03-18 | End: 2019-03-18

## 2019-03-18 NOTE — TELEPHONE ENCOUNTER
Perlita Ruth MD   to Sofia Corey MA           7:15 AM    See previous   Perlita Ruth MD   to Sofia Corey MA           7:14 AM    Stay off Plavix for the 5days prior to proceedure   March 14, 2019              11:47 AM   Sofia Corey MA routed this conversation to Perlita Ruth MD               11:26 AM   You routed this conversation to MD Chin Richardsqueline Staff    Me          11:19 AM   Note      Dr. Ruth,      Pt has a severe allergy to ASA. Pt stated that she breaks out into hives and can not tolerate even a low dose.   Please advise so Pt can be scheduled. (see below for details)          3/14/19 8:29 AM   Note      MD Jeanette Richards, RN   Caller: Unspecified (Yesterday,  1:24 PM)              Yes. SHa can take ASA 81 mg for 5 days instead    Previous Messages              Patient Calls       Perlita Ruth MD   You 14 minutes ago (8:14 AM)            Yes. SHa can take ASA 81 mg for 5 days instead    Routing Comment           You routed conversation to Perlita Ruth MD; Faraz Winstonline Staff 19 hours ago (1:27 PM)           You 19 hours ago (1:24 PM)                 Dr. Ruth,      Please see below. Patient needs to be scheduled for a colonoscopy. Is it okay for patient to hold Plavix 5 days prior to procedure? Please advise.     Thank you                 Toby Paredes MD   to Sandi Fowler MA           12:47 PM    Need Dr. Ruth to make this determination - she is her prescribing stroke doc.  Can you help me get an answer from her?   February 25, 2019               12:56 PM   Sofia Corey MA routed this conversation to Perlita Ruth MD                11:26 AM   Mariel Jasso, RODRIGO routed this conversation to MD Chin Richardsqueline Staff  MD Chin Jay MD Melissa Plauche, RN           11:23 AM   Note      Please see message below and advise regarding  patient holding Plavix as soon as possible. Endoscopy order was placed in October.     Thank you,  Mariel      February 6, 2019               2:25 PM   Mariel Jasso RN routed this conversation to MD Mariel Richards, RODRIGO           2:24 PM   Note      Patient needs to be scheduled for a colonoscopy. Is it okay for patient to hold Plavix 5 days prior to procedure? Please advise.     Thank you,  Mariel Bradshaw

## 2019-03-18 NOTE — TELEPHONE ENCOUNTER
DOCUMENTATION ONLY:  Prior Authorization for Ajovy approved from 02/16/19 to 03/17/20    Case Id: [id number]    Co-pay: $3.80    Patient Assistance IS NOT required.    Forwarded to the clinical pharmacist for consult and shipment.    -ARR

## 2019-03-19 ENCOUNTER — HOME CARE VISIT (OUTPATIENT)
Dept: NEUROLOGY | Facility: HOSPITAL | Age: 54
End: 2019-03-19

## 2019-03-19 ENCOUNTER — TELEPHONE (OUTPATIENT)
Dept: PHARMACY | Facility: CLINIC | Age: 54
End: 2019-03-19

## 2019-03-19 NOTE — TELEPHONE ENCOUNTER
Called and spoke to Pharmacy tech regarding this matter. She stated they never received her refill for PLAVIX 75 MG. I stated that  E-Script it on yesterday and she stated back to me that the pharmacy never received it. Please re send it over. Aleida Carballo is calling regarding this matter.

## 2019-03-19 NOTE — TELEPHONE ENCOUNTER
Initial Ajovy consult and injection training completed on 3/18. Patient is switching from Aimovig. Last dose of Aimovig was given on , so patient will begin Ajovy therapy on 3/28. $3.80 copay. Patient requested a call back next week to get payment and set up shipment.   Confirmed 2 patient identifiers - name and . Therapy Appropriate.    --Injection experience: Aimovig    Counseled patient on administration directions:  - Inject 225 mg (1 syringe) into the skin every 28 days.   - Take out of the refrigerator 30-60 minutes prior to injection.  - Wash hands before and after injection.  - Monthly RX will come with gauze, bandaids, and alcohol swabs.  - Patient may inject in either the tops of the thighs (2 inches above knee and 2 inches below groin), abdomen (at least 2 inches away from belly button), or the outer part of her upper arm with assistance.  - Patient is to wipe down the injection site with the alcohol pad, wait to dry.    - Hold the prefilled syringe with one hand.   - Using the other hand, pull the needle cap straight off. Do not twist the cap.  - Throw the needle cap away immediately.   - Gently pinch up at least 1 inch of the cleaned skin. Insert the needle into the pinched skin at a 45 to 90 degree angle. When the needle is all the way into the skin, slowly press down on the plunger until it is as far as it will go.  - After all of the medication has been injected, pull the needle straight out.  - Do not recap the needle when the injection is complete.   - Patient should rotate injection sites.   - Patient will use sharps container; once full, per [place] law, she/ he may lock the sharps container and place in trash. Pharmacy will replace the sharps at no additional charge.    Patient was counseled on possible side effects:  - Injection site reaction including: redness, soreness, itching, bruising, which should resolve within 3-5 days.  - Allergic reactions including: itching, rash, hives, that can  occur within hours and up to 1 month after taking Ajovy  - Signs and symptoms of a severe allergic reaction: swelling of face, mouth, tongue or throat, and trouble breathing. Informed patient to get medical help immediately if these occur.     Consultation included: indication; goals of treatment; administration; storage and handling; side effects; how to handle side effects. Patient understands to report any medication changes to [facility] and provider. All questions answered and addressed to patients satisfaction. [facility] to contact patient in 3 weeks for refills.     Vishal Lozada, PharmD  Clinical Pharmacist   [facility]   P: [phone]

## 2019-03-20 ENCOUNTER — OFFICE VISIT (OUTPATIENT)
Dept: ELECTROPHYSIOLOGY | Facility: CLINIC | Age: 54
End: 2019-03-20
Payer: MEDICARE

## 2019-03-20 ENCOUNTER — HOSPITAL ENCOUNTER (OUTPATIENT)
Dept: CARDIOLOGY | Facility: CLINIC | Age: 54
Discharge: HOME | End: 2019-03-20
Payer: MEDICARE

## 2019-03-20 VITALS
HEIGHT: 64 IN | DIASTOLIC BLOOD PRESSURE: 83 MMHG | SYSTOLIC BLOOD PRESSURE: 151 MMHG | BODY MASS INDEX: 49.34 KG/M2 | HEART RATE: 60 BPM | WEIGHT: 289 LBS

## 2019-03-20 DIAGNOSIS — I44.2 COMPLETE AV BLOCK: ICD-10-CM

## 2019-03-20 DIAGNOSIS — I48.0 PAROXYSMAL ATRIAL FIBRILLATION: ICD-10-CM

## 2019-03-20 DIAGNOSIS — I44.2 COMPLETE AV BLOCK: Primary | ICD-10-CM

## 2019-03-20 DIAGNOSIS — Z95.810 BIVENTRICULAR AUTOMATIC IMPLANTABLE CARDIOVERTER DEFIBRILLATOR IN SITU: ICD-10-CM

## 2019-03-20 DIAGNOSIS — Z86.74 HISTORY OF SUDDEN CARDIAC ARREST: ICD-10-CM

## 2019-03-20 DIAGNOSIS — E66.01 OBESITY, CLASS III, BMI 40-49.9 (MORBID OBESITY): ICD-10-CM

## 2019-03-20 DIAGNOSIS — G47.33 OSA (OBSTRUCTIVE SLEEP APNEA): ICD-10-CM

## 2019-03-20 DIAGNOSIS — Z86.73 HISTORY OF CVA (CEREBROVASCULAR ACCIDENT): ICD-10-CM

## 2019-03-20 DIAGNOSIS — I10 ESSENTIAL HYPERTENSION: ICD-10-CM

## 2019-03-20 DIAGNOSIS — I42.8 NONISCHEMIC CARDIOMYOPATHY: ICD-10-CM

## 2019-03-20 PROCEDURE — 93010 RHYTHM STRIP: ICD-10-PCS | Mod: S$PBB,,, | Performed by: INTERNAL MEDICINE

## 2019-03-20 PROCEDURE — 99214 PR OFFICE/OUTPT VISIT, EST, LEVL IV, 30-39 MIN: ICD-10-PCS | Mod: S$PBB,,, | Performed by: INTERNAL MEDICINE

## 2019-03-20 PROCEDURE — 99999 PR PBB SHADOW E&M-EST. PATIENT-LVL III: ICD-10-PCS | Mod: PBBFAC,,, | Performed by: INTERNAL MEDICINE

## 2019-03-20 PROCEDURE — 93005 ELECTROCARDIOGRAM TRACING: CPT | Mod: PBBFAC | Performed by: INTERNAL MEDICINE

## 2019-03-20 PROCEDURE — 99214 OFFICE O/P EST MOD 30 MIN: CPT | Mod: S$PBB,,, | Performed by: INTERNAL MEDICINE

## 2019-03-20 PROCEDURE — 99999 PR PBB SHADOW E&M-EST. PATIENT-LVL III: CPT | Mod: PBBFAC,,, | Performed by: INTERNAL MEDICINE

## 2019-03-20 PROCEDURE — 99213 OFFICE O/P EST LOW 20 MIN: CPT | Mod: PBBFAC,25 | Performed by: INTERNAL MEDICINE

## 2019-03-20 PROCEDURE — 93010 ELECTROCARDIOGRAM REPORT: CPT | Mod: S$PBB,,, | Performed by: INTERNAL MEDICINE

## 2019-03-20 RX ORDER — LOSARTAN POTASSIUM 50 MG/1
50 TABLET ORAL DAILY
Qty: 90 TABLET | Refills: 3 | Status: SHIPPED | OUTPATIENT
Start: 2019-03-20 | End: 2019-07-23 | Stop reason: SDUPTHER

## 2019-03-20 RX ORDER — CLOPIDOGREL BISULFATE 75 MG/1
75 TABLET ORAL DAILY
Qty: 90 TABLET | Refills: 3 | Status: CANCELLED | OUTPATIENT
Start: 2019-03-20

## 2019-03-20 NOTE — PROGRESS NOTES
Subjective:    Patient ID:  Amna Chawla is a 53 y.o. female who presents for follow-up of Congestive Heart Failure      HPI    Prior Hx:  I had the pleasure of seeing Amna Chawla in follow-up today for her history of cardiac arrest, heart block, and ICD implantation. As you are aware, she is a 53-year old female, former patient of Dr. Humberto Martins and patient of mine I cared for when she initially presented in cardiac arrest as well as followed in my general cardiology fellow clinic, who was admitted to [facility] in 2/2013 after having a cardiac arrest.  She was working as a school  and collapsed at work.  On EMS arrival it was described that she was pulseless and given epinephrine (? Initially PEA) however after epinephrine noted to be in VF and was resuscitated with defibrillation/ACLS.  She underwent hypothermia protocol. Her post-arrest course was notable or intermittent 3rd-degree AV block. On 2/15/2013, a dual chamber ICD was implanted. Her EF prior to discharge was 60%. She had a negative coronary CTA during that admission.  In subsequent follow-up she underwent a pharmacologic stress ECHO in 2014 which showed a preserved LVEF and no ischemia.     Subsequent ICD interrogations show no VT, 100% RV pacing.  She was also diagnosed with symptomatic paroxysmal AF and was started on anticoagulation. She preferred coumadin.  She noted new onset dyspnea on exertion 9/2017. We performed an echocardiogram and her EF was 40-45% in setting of chronic RV pacing. Recent PET stress showed no ischemia/infarct. We proceeded with upgrade to CRT-D which was performed on 9/27/2017.     At Ms. Chawla's 3 month post CRT-D upgrade visit she noted more energy and improvement in the shortness of breath. She noted very rare diaphragm stimulation.    Ms. Chawla presents for 6 month post-CRT upgrade follow-up in 4/2018. We planned on getting an ECHO at the 6 month chu however it was done early at the 4 month chu. Her  EF improved to 45-50% on that study (see below). Her main issues are complex headaches for which she is seeing neurology.     Ms. Chawla presented for 6 month follow-up 10/2018. She was recently hospitalized for left sided weakness in setting of severe hypertension. CT head was negative for any acute ischemia/bleed. She briefly held xarelto in August 2018 for severe epistaxis and then resumed. Device interrogation noted no recent AF. Repeat ECHO 9/2018 noted EF normalized at 55-60%.     Interim Hx:  Ms. Chawla returns for follow-up. She called the office 3/4/2019 complaining of feeling like heart was out of rhythm. Remote monitoring noted no arrhythmias. Recent remote report notes 11% RA and 99% BiV pacing. No VT noted. Thresholds stable. No AF. ICD sensing in clinic today is 5.9-8mV. Needs colonoscopy.     I reviewed today's ECG tracing, which shows sinus rhythm with BiV paced complexes with QRS duration of 116msec.    Review of Systems   Constitution: Negative for fever, weakness and malaise/fatigue.   HENT: Negative for congestion and sore throat.    Eyes: Negative for blurred vision and visual disturbance.   Cardiovascular: Negative for chest pain, dyspnea on exertion, near-syncope, orthopnea, palpitations, paroxysmal nocturnal dyspnea and syncope.   Respiratory: Negative for cough and shortness of breath.    Hematologic/Lymphatic: Negative for bleeding problem. Does not bruise/bleed easily.   Skin: Negative.    Musculoskeletal: Negative.    Gastrointestinal: Negative for hematochezia and melena.   Neurological: Positive for headaches. Negative for focal weakness.        Objective:    Physical Exam   Constitutional: She is oriented to person, place, and time. She appears well-developed and well-nourished. No distress.   HENT:   Head: Normocephalic and atraumatic.   Eyes: Conjunctivae are normal. Right eye exhibits no discharge. Left eye exhibits no discharge.   Neck: Neck supple. No JVD present.    Cardiovascular: Normal rate, regular rhythm and normal heart sounds. Exam reveals no gallop and no friction rub.   No murmur heard.  Pulmonary/Chest: Effort normal and breath sounds normal. No respiratory distress. She has no wheezes. She has no rales.   ICD site well healed   Abdominal: Soft. Bowel sounds are normal. She exhibits no distension. There is no tenderness. There is no rebound.   Musculoskeletal: She exhibits no edema.   Neurological: She is alert and oriented to person, place, and time.   Skin: Skin is warm and dry. She is not diaphoretic.   Psychiatric: She has a normal mood and affect. Her behavior is normal. Judgment and thought content normal.   Vitals reviewed.        Assessment:       1. Complete AV block    2. Biventricular automatic implantable cardioverter defibrillator in situ    3. History of sudden cardiac arrest    4. Nonischemic cardiomyopathy from RV pacing, resolved with upgrade cardiac resynchronization therapy    5. Paroxysmal atrial fibrillation    6. History of CVA (cerebrovascular accident)    7. SHIRA (obstructive sleep apnea)    8. Obesity, Class III, BMI 40-49.9 (morbid obesity)         Plan:       In summary, Mrs. Chawla is a pleasant 54 yo cardiac arrest survivor with VF noted during arrest, no ischemic heart disease with preserved LVEF, intermittent 3rd degree AV block, and symptomatic LVEF of 40% in setting of normal PET stress and chronic RV pacing who presents for CRT-D follow-up. Doing well. EF has normalized. Continue remote checks every 3 months. No AF. Can hold xarelto 72 hours prior to colonoscopy. Follow-up in 6 months.     Thank you for allowing me to participate in the care of this patient. Please do not hesitate to call me with any questions or concerns.     Avelino Mejia MD, PhD  Cardiac Electrophysiology

## 2019-03-21 ENCOUNTER — TELEPHONE (OUTPATIENT)
Dept: ENDOSCOPY | Facility: HOSPITAL | Age: 54
End: 2019-03-21

## 2019-03-21 ENCOUNTER — PATIENT MESSAGE (OUTPATIENT)
Dept: NEUROLOGY | Facility: HOSPITAL | Age: 54
End: 2019-03-21

## 2019-03-21 NOTE — TELEPHONE ENCOUNTER
Pt to call endoscopy schedulers to schedule colonoscopy after Pt spoke to Pt daughter about availability to accompany Pt day of procedure. Pt did not call endoscopy schedulers as of 3/21/19, Pt contacted to schedule colonoscopy. Pt stated that she has not spoken to her daughter at this time, but will call endoscopy schedulers after Pt speaks to daughter today 3/21/19. Will await Pt call back prior to scheduling Pt for colonoscopy.

## 2019-03-25 ENCOUNTER — TELEPHONE (OUTPATIENT)
Dept: NEUROLOGY | Facility: CLINIC | Age: 54
End: 2019-03-25

## 2019-03-25 NOTE — TELEPHONE ENCOUNTER
Called and spoke to Patient and she has had a migraine for several days.She is due for [facility] and will call specialty pharmacy.Instructed her to take muscle relaxer and try to rest.

## 2019-03-25 NOTE — TELEPHONE ENCOUNTER
Call attempt 1 to set up shipment of Ajovy - LVM and MyChart message sent. Dose due on 3/28. Copay $3.80.    Vishal Lozada, PharmD  Clinical Pharmacist   [facility]   P: [phone]

## 2019-03-26 RX ORDER — CLOPIDOGREL BISULFATE 75 MG/1
TABLET ORAL
Qty: 90 TABLET | Refills: 4 | Status: SHIPPED | OUTPATIENT
Start: 2019-03-26 | End: 2020-03-31 | Stop reason: SDUPTHER

## 2019-03-27 VITALS
DIASTOLIC BLOOD PRESSURE: 78 MMHG | RESPIRATION RATE: 20 BRPM | OXYGEN SATURATION: 98 % | HEART RATE: 72 BPM | SYSTOLIC BLOOD PRESSURE: 126 MMHG

## 2019-03-27 NOTE — PROGRESS NOTES
AAOx3.  Lives w/ family but visit occurred alone.  NIHSS-0; does not smoke; refrains from added salt but does have salty and fast food occasionally; no consistent form of exercise noted; compliant w/ all meds.  Education provided on goal of stroke mobile, s/s of stroke, diet, exercise, medications.  Verbalized understanding.  Encouraged to utilize stroke team for any concern.

## 2019-03-27 NOTE — TELEPHONE ENCOUNTER
Call attempt 2 to set up shipment of Ajovy - LVM. Previous "[employer] message sent was read on 3/26. Patient switching from Aimovig. Dose due on 3/28. Copay $3.80.    Vishal Lozada, PharmD  Clinical Pharmacist   [facility]   P: [phone]

## 2019-03-29 DIAGNOSIS — E55.9 VITAMIN D DEFICIENCY: ICD-10-CM

## 2019-03-29 RX ORDER — ERGOCALCIFEROL 1.25 MG/1
CAPSULE ORAL
Qty: 8 CAPSULE | Refills: 0 | OUTPATIENT
Start: 2019-03-29

## 2019-03-31 ENCOUNTER — EXTERNAL CHRONIC CARE MANAGEMENT (OUTPATIENT)
Dept: PRIMARY CARE CLINIC | Facility: CLINIC | Age: 54
End: 2019-03-31
Payer: MEDICARE

## 2019-03-31 PROCEDURE — 99490 CHRNC CARE MGMT STAFF 1ST 20: CPT | Mod: PBBFAC | Performed by: PSYCHIATRY & NEUROLOGY

## 2019-03-31 PROCEDURE — 99490 CHRNC CARE MGMT STAFF 1ST 20: CPT | Mod: S$PBB,,, | Performed by: PSYCHIATRY & NEUROLOGY

## 2019-03-31 PROCEDURE — 99490 PR CHRONIC CARE MGMT, 1ST 20 MIN: ICD-10-PCS | Mod: S$PBB,,, | Performed by: PSYCHIATRY & NEUROLOGY

## 2019-04-01 DIAGNOSIS — R25.1 FUNCTIONAL TREMOR: Primary | ICD-10-CM

## 2019-04-01 NOTE — TELEPHONE ENCOUNTER
Call attempt 3 to set up shipment of Ajovy - LVM. Previous [employer] message sent was read on 3/26. Patient switching from Aimovig. Dose was due on 3/28. Copay $3.80.    Vishal Lozada, PharmD  Clinical Pharmacist   [facility]   P: [phone]

## 2019-04-01 NOTE — TELEPHONE ENCOUNTER
----- Message from Cici Hill sent at 4/1/2019 11:04 AM CDT -----  Contact: self @ [phone]  Pt is req a new prescription for lorazapa 0.5 mg.    [id number] - [place], [place] - [address] AT [facility] [phone] (Phone)    [phone] (Fax)

## 2019-04-01 NOTE — TELEPHONE ENCOUNTER
----- Message from Cici Hill sent at 4/1/2019 11:04 AM CDT -----  Contact: self @ [phone]  Pt is req a new prescription for lorazapa 0.5 mg.    [id number] - [place], [place] - [address] AT [facility] [phone] (Phone)    [phone] (Fax)

## 2019-04-02 RX ORDER — LORAZEPAM 0.5 MG/1
0.5 TABLET ORAL EVERY 6 HOURS PRN
Qty: 30 TABLET | Refills: 0 | Status: SHIPPED | OUTPATIENT
Start: 2019-04-02 | End: 2019-12-24

## 2019-04-04 ENCOUNTER — OFFICE VISIT (OUTPATIENT)
Dept: PSYCHIATRY | Facility: CLINIC | Age: 54
End: 2019-04-04
Payer: MEDICARE

## 2019-04-04 VITALS
WEIGHT: 289.69 LBS | DIASTOLIC BLOOD PRESSURE: 67 MMHG | HEART RATE: 77 BPM | HEIGHT: 64 IN | BODY MASS INDEX: 49.46 KG/M2 | SYSTOLIC BLOOD PRESSURE: 116 MMHG

## 2019-04-04 DIAGNOSIS — F33.1 DEPRESSION, MAJOR, RECURRENT, MODERATE: ICD-10-CM

## 2019-04-04 DIAGNOSIS — F41.9 ANXIETY: ICD-10-CM

## 2019-04-04 DIAGNOSIS — G47.00 INSOMNIA, UNSPECIFIED TYPE: ICD-10-CM

## 2019-04-04 DIAGNOSIS — R25.1 FUNCTIONAL TREMOR: ICD-10-CM

## 2019-04-04 PROCEDURE — 99213 OFFICE O/P EST LOW 20 MIN: CPT | Mod: PBBFAC | Performed by: NURSE PRACTITIONER

## 2019-04-04 PROCEDURE — 99999 PR PBB SHADOW E&M-EST. PATIENT-LVL III: CPT | Mod: PBBFAC,,, | Performed by: NURSE PRACTITIONER

## 2019-04-04 PROCEDURE — 90833 PR PSYCHOTHERAPY W/PATIENT W/E&M, 30 MIN (ADD ON): ICD-10-PCS | Mod: S$PBB,,, | Performed by: NURSE PRACTITIONER

## 2019-04-04 PROCEDURE — 99213 PR OFFICE/OUTPT VISIT, EST, LEVL III, 20-29 MIN: ICD-10-PCS | Mod: S$PBB,,, | Performed by: NURSE PRACTITIONER

## 2019-04-04 PROCEDURE — 90833 PSYTX W PT W E/M 30 MIN: CPT | Mod: S$PBB,,, | Performed by: NURSE PRACTITIONER

## 2019-04-04 PROCEDURE — 99999 PR PBB SHADOW E&M-EST. PATIENT-LVL III: ICD-10-PCS | Mod: PBBFAC,,, | Performed by: NURSE PRACTITIONER

## 2019-04-04 PROCEDURE — 99213 OFFICE O/P EST LOW 20 MIN: CPT | Mod: S$PBB,,, | Performed by: NURSE PRACTITIONER

## 2019-04-04 PROCEDURE — 90833 PSYTX W PT W E/M 30 MIN: CPT | Mod: PBBFAC | Performed by: NURSE PRACTITIONER

## 2019-04-04 RX ORDER — ARIPIPRAZOLE 10 MG/1
TABLET ORAL
Qty: 30 TABLET | Refills: 5 | Status: SHIPPED | OUTPATIENT
Start: 2019-04-04 | End: 2019-08-20

## 2019-04-04 RX ORDER — HYDROXYZINE HYDROCHLORIDE 50 MG/1
50 TABLET, FILM COATED ORAL NIGHTLY PRN
Qty: 30 TABLET | Refills: 5 | Status: SHIPPED | OUTPATIENT
Start: 2019-04-04 | End: 2019-08-20 | Stop reason: SDUPTHER

## 2019-04-04 RX ORDER — CLONAZEPAM 1 MG/1
1 TABLET ORAL DAILY PRN
Qty: 30 TABLET | Refills: 3 | Status: SHIPPED | OUTPATIENT
Start: 2019-04-04 | End: 2019-08-20 | Stop reason: SDUPTHER

## 2019-04-04 RX ORDER — ZOLPIDEM TARTRATE 12.5 MG/1
TABLET, FILM COATED, EXTENDED RELEASE ORAL
Qty: 30 TABLET | Refills: 3 | Status: SHIPPED | OUTPATIENT
Start: 2019-04-04 | End: 2019-04-23

## 2019-04-04 RX ORDER — LORAZEPAM 0.5 MG/1
0.5 TABLET ORAL EVERY 6 HOURS PRN
Qty: 30 TABLET | Refills: 0 | OUTPATIENT
Start: 2019-04-04 | End: 2019-05-04

## 2019-04-04 RX ORDER — HYDROXYZINE HYDROCHLORIDE 10 MG/1
10 TABLET, FILM COATED ORAL 3 TIMES DAILY PRN
Qty: 90 TABLET | Refills: 5 | Status: SHIPPED | OUTPATIENT
Start: 2019-04-04 | End: 2019-08-20 | Stop reason: SDUPTHER

## 2019-04-04 RX ORDER — DULOXETIN HYDROCHLORIDE 60 MG/1
60 CAPSULE, DELAYED RELEASE ORAL 2 TIMES DAILY
Qty: 60 CAPSULE | Refills: 5 | Status: SHIPPED | OUTPATIENT
Start: 2019-04-04 | End: 2019-08-20 | Stop reason: SDUPTHER

## 2019-04-04 NOTE — TELEPHONE ENCOUNTER
Patient requesting refill of lorazepam .5mg 1 tab Q6H PRN for anxiety be sent to [facility] on [address].

## 2019-04-04 NOTE — PROGRESS NOTES
"Outpatient Psychiatry Follow-Up Visit (MD/NP)    4/4/2019    Clinical Status of Patient:  Outpatient (Ambulatory)    Chief Complaint:  Amna Chawla is a 53 y.o. female who presents today for follow-up of depression and anxiety.  Met with patient.      Last Visit:  was on 1/07/19. Chart and  reviewed.     Interval History and Content of Current Session:  Current Medication Profile for Psych  · Continue Cymbalta 60 mg po BID  · Continue Abilify 5 mg po daily  · Continue Ambien 10 mg po q hs PRN insomnia  · Continue  Atarx (hydroxyzine) 50 mg po q hs PRN insomnia and start Atarax 10 mg po TID PRN anxiety  · Continue Klonopin 1 mg po daily PRN severe anxiety  · Also taking Neurontin and Topamax from Neurology    Pt stated, "I've been having some depressed days and crying. My family notices that I go from feeling cheerful to snapping at them".   Denies any situational stressors.  Thought processes are clear and organized.  Compliant with medications and reports effectiveness and denies side effects; no EPS/TD; completed AIMS scale.  Denies SI/HI/AVH.   Will increase Abilify.     Psychotherapy:  · Target symptoms: depression, anxiety   · Why chosen therapy is appropriate versus another modality: relevant to diagnosis  · Outcome monitoring methods: self-report, observation  · Therapeutic intervention type: insight oriented psychotherapy, CBT  · Topics discussed/themes: relationships difficulties, parenting issues, stress related to medical comorbidities, difficulty managing affect in interpersonal relationships, building skills sets for symptom management, symptom recognition  · The patient's response to the intervention is accepting. The patient's progress toward treatment goals is fair.   · Duration of intervention: 19 minutes.    Review of Systems   · PSYCHIATRIC: Pertinant items are noted in the narrative.  · CONSTITUTIONAL: No weight gain or loss.   · ENDOCRINE: No polydipsia or polyuria.  · INTEGUMENTARY: No " "rashes or lacerations.  · EYES: No exophthalmos, jaundice or blindness.  · ENT: No dizziness, tinnitus or hearing loss.  · RESPIRATORY: No shortness of breath.  · GASTROINTESTINAL: No nausea, vomiting, pain, constipation or diarrhea.  · GENITOURINARY: No frequency, dysuria or sexual dysfunction.  · HEMATOLOGIC/LYMPHATIC: No excessive bleeding, prolonged or excessive bleeding after dental extraction/injury.  · ALLERGIC/IMMUNOLOGIC: No allergic response to materials, foods or animals at this time.    Past Medical, Family and Social History: The patient's past medical, family and social history have been reviewed and updated as appropriate within the electronic medical record - see encounter notes.    Compliance: yes    Side effects: None    Risk Parameters:  Patient reports no suicidal ideation  Patient reports no homicidal ideation  Patient reports no self-injurious behavior  Patient reports no violent behavior    Exam (detailed: at least 9 elements; comprehensive: all 15 elements)   Constitutional  Vitals:  Most recent vital signs, dated less than 90 days prior to this appointment, were reviewed.   Vitals:    04/04/19 0754   BP: 116/67   Pulse: 77   Weight: 131.4 kg (289 lb 11 oz)   Height: 5' 4" (1.626 m)        General:  unremarkable, age appropriate     Musculoskeletal  Muscle Strength/Tone:  no dystonia, no tremor, no tic   Gait & Station:  non-ataxic     Psychiatric  Speech:  no latency; no press   Mood & Affect:  dysthymic, irritable  irritable   Thought Process:  normal and logical   Associations:  intact   Thought Content:  normal, no suicidality, no homicidality, delusions, or paranoia   Insight:  has awareness of illness   Judgement: behavior is adequate to circumstances, age appropriate   Orientation:  grossly intact, person, place, situation, time/date   Memory: intact for content of interview, able to remember recent events- yes, able to remember remote events- yes   Language: grossly intact   Attention " Span & Concentration:  able to focus   Fund of Knowledge:  intact and appropriate to age and level of education, familiar with aspects of current personal life     Assessment and Diagnosis   Status/Progress: Based on the examination today, the patient's problem(s) is/are adequately but not ideally controlled.  New problems have not been presented today.   Co-morbidities and Lack of compliance are not complicating management of the primary condition.  There are no active rule-out diagnoses for this patient at this time.     General Impression:       ICD-10-CM ICD-9-CM   1. Depression, major, recurrent, moderate F33.1 296.32   2. Insomnia, unspecified type G47.00 780.52   3. Anxiety F41.9 300.00       Intervention/Counseling/Treatment Plan   · Medication Management: The risks and benefits of medication were discussed with the patient.   · Continue Cymbalta 60 mg po BID  · Increase to Abilify 10 mg po daily  · Continue Ambien 10 mg po q hs PRN insomnia  · Continue  Atarx (hydroxyzine) 50 mg po q hs PRN insomnia and start Atarax 10 mg po TID PRN anxiety  · Continue Klonopin 1 mg po daily PRN severe anxiety  · Continue individual therapy with Dr. Rosales    Return to Clinic: 6 months

## 2019-04-04 NOTE — TELEPHONE ENCOUNTER
----- Message from London Lee sent at 4/4/2019  8:48 AM CDT -----  Rx Refill/Request     Is this a Refill or New Rx: Refill  Rx Name and Strength: LORazepam (ATIVAN) 0.5 MG tablet  Preferred Pharmacy with phone number: see below  Communication Preference: [phone]  Additional Information:     [id number] [address] - [place], [place] - [address] AT [address] [place] [address]  Phone: [phone] Fax: [phone]

## 2019-04-09 ENCOUNTER — TELEPHONE (OUTPATIENT)
Dept: NEUROLOGY | Facility: CLINIC | Age: 54
End: 2019-04-09

## 2019-04-16 DIAGNOSIS — Z12.11 SPECIAL SCREENING FOR MALIGNANT NEOPLASMS, COLON: Primary | ICD-10-CM

## 2019-04-16 RX ORDER — POLYETHYLENE GLYCOL 3350, SODIUM SULFATE ANHYDROUS, SODIUM BICARBONATE, SODIUM CHLORIDE, POTASSIUM CHLORIDE 236; 22.74; 6.74; 5.86; 2.97 G/4L; G/4L; G/4L; G/4L; G/4L
4 POWDER, FOR SOLUTION ORAL ONCE
Qty: 4000 ML | Refills: 0 | Status: SHIPPED | OUTPATIENT
Start: 2019-04-16 | End: 2019-04-16

## 2019-04-23 DIAGNOSIS — G47.00 INSOMNIA, UNSPECIFIED TYPE: Primary | ICD-10-CM

## 2019-04-23 RX ORDER — ZALEPLON 10 MG/1
10 CAPSULE ORAL NIGHTLY
Qty: 30 CAPSULE | Refills: 0 | Status: SHIPPED | OUTPATIENT
Start: 2019-04-23 | End: 2019-05-23

## 2019-04-24 VITALS
HEART RATE: 70 BPM | RESPIRATION RATE: 20 BRPM | OXYGEN SATURATION: 97 % | DIASTOLIC BLOOD PRESSURE: 82 MMHG | SYSTOLIC BLOOD PRESSURE: 128 MMHG

## 2019-04-24 NOTE — PROGRESS NOTES
AAOx3.  Lives w/ family but visit occurred alone.  NIHSS-0; does not smoke; compliant w/ all meds; walks hallways at apartment building for exercise; refrains from adding salt, salty and fast food.   Education provided on goal of stroke mobile, s/s of stroke, diet , medications, exercise.  Verbalized understanding. Encouraged to utilize stroke team for any concern.

## 2019-04-25 ENCOUNTER — TELEPHONE (OUTPATIENT)
Dept: PHARMACY | Facility: CLINIC | Age: 54
End: 2019-04-25

## 2019-04-30 ENCOUNTER — EXTERNAL CHRONIC CARE MANAGEMENT (OUTPATIENT)
Dept: PRIMARY CARE CLINIC | Facility: CLINIC | Age: 54
End: 2019-04-30
Payer: MEDICARE

## 2019-04-30 PROCEDURE — 99490 PR CHRONIC CARE MGMT, 1ST 20 MIN: ICD-10-PCS | Mod: S$PBB,,, | Performed by: PSYCHIATRY & NEUROLOGY

## 2019-04-30 PROCEDURE — 99490 CHRNC CARE MGMT STAFF 1ST 20: CPT | Mod: PBBFAC | Performed by: PSYCHIATRY & NEUROLOGY

## 2019-04-30 PROCEDURE — 99490 CHRNC CARE MGMT STAFF 1ST 20: CPT | Mod: S$PBB,,, | Performed by: PSYCHIATRY & NEUROLOGY

## 2019-05-01 ENCOUNTER — HOME CARE VISIT (OUTPATIENT)
Dept: NEUROLOGY | Facility: HOSPITAL | Age: 54
End: 2019-05-01

## 2019-05-07 ENCOUNTER — HOSPITAL ENCOUNTER (OUTPATIENT)
Facility: HOSPITAL | Age: 54
Discharge: HOME | End: 2019-05-07
Attending: INTERNAL MEDICINE | Admitting: INTERNAL MEDICINE
Payer: MEDICARE

## 2019-05-07 ENCOUNTER — ANESTHESIA EVENT (OUTPATIENT)
Dept: ENDOSCOPY | Facility: HOSPITAL | Age: 54
End: 2019-05-07
Payer: MEDICARE

## 2019-05-07 ENCOUNTER — ANESTHESIA (OUTPATIENT)
Dept: ENDOSCOPY | Facility: HOSPITAL | Age: 54
End: 2019-05-07
Payer: MEDICARE

## 2019-05-07 VITALS
DIASTOLIC BLOOD PRESSURE: 57 MMHG | BODY MASS INDEX: 47.8 KG/M2 | WEIGHT: 280 LBS | OXYGEN SATURATION: 100 % | HEART RATE: 62 BPM | HEIGHT: 64 IN | TEMPERATURE: 99 F | SYSTOLIC BLOOD PRESSURE: 115 MMHG | RESPIRATION RATE: 18 BRPM

## 2019-05-07 DIAGNOSIS — Z12.11 COLON CANCER SCREENING: Primary | ICD-10-CM

## 2019-05-07 PROCEDURE — 63600175 PHARM REV CODE 636 W HCPCS: Performed by: NURSE ANESTHETIST, CERTIFIED REGISTERED

## 2019-05-07 PROCEDURE — 88305 TISSUE EXAM BY PATHOLOGIST: CPT | Performed by: PATHOLOGY

## 2019-05-07 PROCEDURE — 45385 COLONOSCOPY W/LESION REMOVAL: CPT | Performed by: INTERNAL MEDICINE

## 2019-05-07 PROCEDURE — 25000003 PHARM REV CODE 250: Performed by: INTERNAL MEDICINE

## 2019-05-07 PROCEDURE — 37000008 HC ANESTHESIA 1ST 15 MINUTES: Performed by: INTERNAL MEDICINE

## 2019-05-07 PROCEDURE — 37000009 HC ANESTHESIA EA ADD 15 MINS: Performed by: INTERNAL MEDICINE

## 2019-05-07 PROCEDURE — 88305 TISSUE EXAM BY PATHOLOGIST: CPT | Mod: 26,,, | Performed by: PATHOLOGY

## 2019-05-07 PROCEDURE — 45385 COLONOSCOPY W/LESION REMOVAL: CPT | Mod: PT,,, | Performed by: INTERNAL MEDICINE

## 2019-05-07 PROCEDURE — D9220A PRA ANESTHESIA: ICD-10-PCS | Mod: PT,ANES,, | Performed by: ANESTHESIOLOGY

## 2019-05-07 PROCEDURE — D9220A PRA ANESTHESIA: ICD-10-PCS | Mod: PT,CRNA,, | Performed by: NURSE ANESTHETIST, CERTIFIED REGISTERED

## 2019-05-07 PROCEDURE — 88305 TISSUE SPECIMEN TO PATHOLOGY - SURGERY: ICD-10-PCS | Mod: 26,,, | Performed by: PATHOLOGY

## 2019-05-07 PROCEDURE — 45385 PR COLONOSCOPY,REMV LESN,SNARE: ICD-10-PCS | Mod: PT,,, | Performed by: INTERNAL MEDICINE

## 2019-05-07 PROCEDURE — 27201089 HC SNARE, DISP (ANY): Performed by: INTERNAL MEDICINE

## 2019-05-07 PROCEDURE — D9220A PRA ANESTHESIA: Mod: PT,ANES,, | Performed by: ANESTHESIOLOGY

## 2019-05-07 PROCEDURE — D9220A PRA ANESTHESIA: Mod: PT,CRNA,, | Performed by: NURSE ANESTHETIST, CERTIFIED REGISTERED

## 2019-05-07 RX ORDER — SODIUM CHLORIDE 0.9 % (FLUSH) 0.9 %
3 SYRINGE (ML) INJECTION
Status: DISCONTINUED | OUTPATIENT
Start: 2019-05-07 | End: 2019-05-07 | Stop reason: HOSPADM

## 2019-05-07 RX ORDER — LIDOCAINE HCL/PF 100 MG/5ML
SYRINGE (ML) INTRAVENOUS
Status: DISCONTINUED | OUTPATIENT
Start: 2019-05-07 | End: 2019-05-07

## 2019-05-07 RX ORDER — ONDANSETRON 2 MG/ML
4 INJECTION INTRAMUSCULAR; INTRAVENOUS ONCE AS NEEDED
Status: DISCONTINUED | OUTPATIENT
Start: 2019-05-07 | End: 2019-05-07 | Stop reason: HOSPADM

## 2019-05-07 RX ORDER — SODIUM CHLORIDE 9 MG/ML
INJECTION, SOLUTION INTRAVENOUS CONTINUOUS
Status: DISCONTINUED | OUTPATIENT
Start: 2019-05-07 | End: 2019-05-07 | Stop reason: HOSPADM

## 2019-05-07 RX ORDER — PROPOFOL 10 MG/ML
VIAL (ML) INTRAVENOUS CONTINUOUS PRN
Status: DISCONTINUED | OUTPATIENT
Start: 2019-05-07 | End: 2019-05-07

## 2019-05-07 RX ORDER — PROPOFOL 10 MG/ML
VIAL (ML) INTRAVENOUS
Status: DISCONTINUED | OUTPATIENT
Start: 2019-05-07 | End: 2019-05-07

## 2019-05-07 RX ADMIN — LIDOCAINE HYDROCHLORIDE 50 MG: 20 INJECTION, SOLUTION INTRAVENOUS at 11:05

## 2019-05-07 RX ADMIN — PROPOFOL 50 MG: 10 INJECTION, EMULSION INTRAVENOUS at 11:05

## 2019-05-07 RX ADMIN — SODIUM CHLORIDE: 0.9 INJECTION, SOLUTION INTRAVENOUS at 11:05

## 2019-05-07 RX ADMIN — PROPOFOL 100 MCG/KG/MIN: 10 INJECTION, EMULSION INTRAVENOUS at 11:05

## 2019-05-07 NOTE — [employer] PATIENT INSTRUCTIONS
Discharge Summary/Instructions after an Endoscopic Procedure  Patient Name: Amna Chawla  Patient MRN: [id number]  Patient YOB: 1965  Tuesday, May 07, 2019  Juan Coffey MD  RESTRICTIONS:  During your procedure today, you received medications for sedation.  These   medications may affect your judgment, balance and coordination.  Therefore,   for 24 hours, you have the following restrictions:   - DO NOT drive a car, operate machinery, make legal/financial decisions,   sign important papers or drink alcohol.    ACTIVITY:  Today: no heavy lifting, straining or running due to procedural   sedation/anesthesia.  The following day: return to full activity including work.  DIET:  Eat and drink normally unless instructed otherwise.     TREATMENT FOR COMMON SIDE EFFECTS:  - Mild abdominal pain, nausea, belching, bloating or excessive gas:  rest,   eat lightly and use a heating pad.  - Sore Throat: treat with throat lozenges and/or gargle with warm salt   water.  - Because air was used during the procedure, expelling large amounts of air   from your rectum or belching is normal.  - If a bowel prep was taken, you may not have a bowel movement for 1-3 days.    This is normal.  SYMPTOMS TO WATCH FOR AND REPORT TO YOUR PHYSICIAN:  1. Abdominal pain or bloating, other than gas cramps.  2. Chest pain.  3. Back pain.  4. Signs of infection such as: chills or fever occurring within 24 hours   after the procedure.  5. Rectal bleeding, which would show as bright red, maroon, or black stools.   (A tablespoon of blood from the rectum is not serious, especially if   hemorrhoids are present.)  6. Vomiting.  7. Weakness or dizziness.  GO DIRECTLY TO THE NEAREST EMERGENCY ROOM IF YOU HAVE ANY OF THE FOLLOWING:      Difficulty breathing              Chills and/or fever over 101 F   Persistent vomiting and/or vomiting blood   Severe abdominal pain   Severe chest pain   Black, tarry stools   Bleeding- more than one tablespoon   Any  other symptom or condition that you feel may need urgent attention  Your doctor recommends these additional instructions:  If any biopsies were taken, your doctors clinic will contact you in 1 to 2   weeks with any results.  - Discharge patient to home.   - Patient has a contact number available for emergencies.  The signs and   symptoms of potential delayed complications were discussed with the   patient.  Return to normal activities tomorrow.  Written discharge   instructions were provided to the patient.   - Resume previous diet.   - Continue present medications.   - Await pathology results.   - Telephone GI clinic for pathology results in 1 week.   - Repeat colonoscopy in 3 years for surveillance.   - Resume Plavix (clopidogrel) tomorrow and Xarelto (rivaroxaban) tomorrow at   prior doses.  For questions, problems or results please call your physician - Juan Coffey MD at Work:  [phone].  [facility] [place], EMERGENCY ROOM PHONE NUMBER: [phone]  IF A COMPLICATION OR EMERGENCY SITUATION ARISES AND YOU ARE UNABLE TO REACH   YOUR PHYSICIAN - GO DIRECTLY TO THE EMERGENCY ROOM.  Juan Coffey MD  5/7/2019 12:22:35 PM  This report has been verified and signed electronically.  [employer]

## 2019-05-07 NOTE — ANESTHESIA PREPROCEDURE EVALUATION
05/07/2019    Pre-operative evaluation for Procedure(s) (LRB):  COLONOSCOPY (N/A)    Amna Chawla is a 53 y.o. female      Patient Active Problem List   Diagnosis    Complete AV block    Pacemaker    HLD (hyperlipidemia)    History of CVA (cerebrovascular accident)    Knee pain    Hiatal hernia    GERD (gastroesophageal reflux disease)    Occipital neuralgia    Chronic migraine without aura, with intractable migraine, so stated, with status migrainosus    Obesity, Class III, BMI 40-49.9 (morbid obesity)    History of sudden cardiac arrest    Biventricular automatic implantable cardioverter defibrillator in situ    Left-sided headache    Essential hypertension    Supraorbital neuralgia    Anxiety    Keratoconjunctivitis sicca of both eyes not specified as Sjogren's    Right carpal tunnel syndrome    Impaired mobility and ADLs    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    Headaches due to old head injury    Decreased ROM of neck    Bilateral carpal tunnel syndrome    Paroxysmal atrial fibrillation    Long term (current) use of anticoagulants    Impaired functional mobility, balance, gait, and endurance    SHIRA (obstructive sleep apnea)    Nonischemic cardiomyopathy from RV pacing, resolved with upgrade cardiac resynchronization therapy    History of DVT (deep vein thrombosis)    Frequent falls    Depression, major, recurrent, moderate    Insomnia    Left atrial enlargement    History of TIA (transient ischemic attack)    Thyroid cyst    Hemispheric carotid artery syndrome    Lightheadedness    Thyroid nodule       Review of patient's allergies indicates:   Allergen Reactions    Aspirin Hives    Imitrex [sumatriptan] Palpitations    Penicillins Hives and Swelling    Shellfish containing products Anaphylaxis     seafood    Percocet  [oxycodone-acetaminophen] Itching     States can take    Reglan [metoclopramide hcl] Other (See Comments)     Parkinsonism        Current Facility-Administered Medications on File Prior to Encounter   Medication Dose Route Frequency Provider Last Rate Last Dose    onabotulinumtoxina injection 200 Units  200 Units Intramuscular Q90 Days David Cortez MD   200 Units at 10/19/18 1713     Current Outpatient Medications on File Prior to Encounter   Medication Sig Dispense Refill    carvedilol (COREG) 25 MG tablet TAKE 1 TABLET(25 MG) BY MOUTH TWICE DAILY WITH MEALS 180 tablet 3    donepezil (ARICEPT) 10 MG tablet Take 1 tablet (10 mg total) by mouth 2 (two) times daily. 180 tablet 3    gabapentin (NEURONTIN) 300 MG capsule Take 3 capsules (900 mg total) by mouth 3 (three) times daily. 810 capsule 12    magnesium 200 mg Tab Take 200 mg by mouth once daily. (Patient taking differently: Take 200 mg by mouth every other day. ) 30 each 12    pantoprazole (PROTONIX) 40 MG tablet Take 1 tablet (40 mg total) by mouth once daily. 30 tablet 11    rosuvastatin (CRESTOR) 40 MG Tab Take 1 tablet (40 mg total) by mouth every evening. 90 tablet 3    tiaGABine (GABITRIL) 4 MG tablet Take 1 tablet (4 mg total) by mouth every evening. 30 tablet 12       Past Surgical History:   Procedure Laterality Date    APPENDECTOMY      breast reduction      CARDIAC DEFIBRILLATOR PLACEMENT      CARDIAC DEFIBRILLATOR PLACEMENT      CARPAL TUNNEL RELEASE Right     colon  N/A 6/24/2014    [facility] ([facility]    egd N/A 6/24/2014    Performed [facility] ([facility])    HERNIA REPAIR      HIATAL HERNIA REPAIR      INSERT / REPLACE / REMOVE PACEMAKER  10/2017    CRT-D upgrade    INSERTION, CARDIAC PACEMAKER, DUAL CHAMBER N/A 2/15/2013    Per[facility]ins [facility]    INSERTION-ICD-BIVENTRICULAR N/A 10/13/2017    Performed by [facility]jia [facility]     RELEASE-CARPAL TUNNEL Left 2/3/2017    [facility]ugh [facility]    RELEASE-CARPAL TUNNEL Right 12/9/2016    Performed by Matt Puhg Jr., [facility]    TOTAL REDUCTION MAMMOPLASTY      TUBAL LIGATION      VENOGRAM N/A 10/13/2017    Performed by [facility]jia MD at [facility] CATH [facility]       Social History     Socioeconomic History    Marital status: Single     Spouse name: Not on file    Number of children: Not on file    Years of education: Not on file    Highest education level: Not on file   Occupational History     Employer: [employer]   Social Needs    Financial resource strain: Not on file    Food insecurity:     Worry: Not on file     Inability: Not on file    Transportation needs:     Medical: Not on file     Non-medical: Not on file   Tobacco Use    Smoking status: Never Smoker    Smokeless tobacco: Never Used   Substance and Sexual Activity    Alcohol use: No    Drug use: No    Sexual activity: Not Currently   Lifestyle    Physical activity:     Days per week: Not on file     Minutes per session: Not on file    Stress: Not on file   Relationships    Social connections:     Talks on phone: Not on file     Gets together: Not on file     Attends Jewish service: Not on file     Active member of club or organization: Not on file     Attends meetings of clubs or organizations: Not on file     Relationship status: Not on file   Other Topics Concern    Patient feels they ought to cut down on drinking/drug use Not Asked    Patient annoyed by others criticizing their drinking/drug use Not Asked    Patient has felt bad or guilty about drinking/drug use Not Asked    Patient has had a drink/used drugs as an eye opener in the AM Not Asked    Are you pregnant or think you may be? Not Asked    Breast-feeding Not Asked   Social History Narrative    Now on disability         EKG:  AV dual-paced rhythm  Biventricular pacemaker detected  Abnormal  ECG  When compared with ECG of 03-OCT-2018 14:38,  No significant change was found    2D Echo:  Results for orders placed or performed during the hospital encounter of 09/28/18   2D echo with color flow doppler   Result Value Ref Range    QEF 55 55 - 65    Est. PA Systolic Pressure 25.28     Tricuspid Valve Regurgitation MILD          Anesthesia Evaluation    I have reviewed the Patient Summary Reports.    I have reviewed the Nursing Notes.   I have reviewed the Medications.     Review of Systems  Anesthesia Hx:  No problems with previous Anesthesia  History of prior surgery of interest to airway management or planning: Previous anesthesia: General 12/16 CTR, propofol qqt with general anesthesia.  Denies Family Hx of Anesthesia complications.   Denies Personal Hx of Anesthesia complications.   Social:  Non-Smoker    Hematology/Oncology:  Hematology Normal   Oncology Normal     Cardiovascular:   Pacemaker ([employer]ap[employer]i AICD) Hypertension, well controlled Denies CAD.   Dysrhythmias  hyperlipidemia CONCLUSIONS     11 - Normal left ventricular systolic function (EF 55-60%).     2 - Biatrial enlargement.     3 - No wall motion abnormalities.     4 - Indeterminate LV diastolic function.     5 - Normal right ventricular systolic function .     6 - The estimated PA systolic pressure is 25 mmHg.     7 - Mild tricuspid regurgitation.     Pulmonary:   Asthma asymptomatic Sleep Apnea Past hx of asthma but no attacks in years   Renal/:  Renal/ Normal     Hepatic/GI:   GERD, well controlled    Musculoskeletal:   botox injections for cervical spasms and occipital headache Spine Disorders: cervical and lumbar    Neurological:   CVA, residual symptoms Seizures Left sided weakness post CVA 2013/anoxic brain injury    Last seizure was in 2014, then only partial   Endocrine:  Endocrine Normal        Physical Exam  General:  Morbid Obesity    Airway/Jaw/Neck:  Airway Findings: Mouth Opening: Normal Tongue: Large  General Airway  Assessment: Adult  Mallampati: II  TM Distance: Normal, at least 6 cm         Dental:  Dental Findings: In tact             Anesthesia Plan  Type of Anesthesia, risks & benefits discussed:  Anesthesia Type:  general  Patient's Preference:   Intra-op Monitoring Plan: standard ASA monitors  Intra-op Monitoring Plan Comments:   Post Op Pain Control Plan: multimodal analgesia and per primary service following discharge from PACU  Post Op Pain Control Plan Comments:   Induction:   IV  Beta Blocker:  Patient is on a Beta-Blocker and has received one dose within the past 24 hours (No further documentation required).       Informed Consent: Patient understands risks and agrees with Anesthesia plan.  Questions answered. Anesthesia consent signed with patient.  ASA Score: 3     Day of Surgery Review of History & Physical:    H&P update referred to the surgeon.         Ready For Surgery From Anesthesia Perspective.

## 2019-05-07 NOTE — TRANSFER OF CARE
"Anesthesia Transfer of Care Note    Patient: Amna Chawla    Procedure(s) Performed: Procedure(s) (LRB):  COLONOSCOPY (N/A)    Patient location: [facility]    Anesthesia Type: general    Transport from OR: Transported from OR on room air with adequate spontaneous ventilation    Post pain: adequate analgesia    Post assessment: no apparent anesthetic complications and tolerated procedure well    Post vital signs: stable    Level of consciousness: awake, alert and oriented    Nausea/Vomiting: no nausea/vomiting    Complications: none    Transfer of care protocol was followed      Last vitals:   Visit Vitals  BP (!) 115/57 (BP Location: Left arm, Patient Position: Lying)   Pulse 72   Temp 36.7 °C (98.1 °F) (Skin)   Resp 15   Ht 5' 4" (1.626 m)   Wt 127 kg (280 lb)   SpO2 100%   Breastfeeding? No   BMI 48.06 kg/m²     "

## 2019-05-07 NOTE — ANESTHESIA POSTPROCEDURE EVALUATION
Anesthesia Post Evaluation    Patient: Amna Chawla    Procedure(s) Performed: Procedure(s) (LRB):  COLONOSCOPY (N/A)    Final Anesthesia Type: general  Patient location during evaluation: PACU  Patient participation: Yes- Able to Participate  Level of consciousness: awake and alert and oriented  Post-procedure vital signs: reviewed and stable  Pain management: adequate  Airway patency: patent  PONV status at discharge: No PONV  Anesthetic complications: no      Cardiovascular status: blood pressure returned to baseline and hemodynamically stable  Respiratory status: unassisted, room air and spontaneous ventilation  Hydration status: euvolemic  Follow-up not needed.          Vitals Value Taken Time   /52 5/7/2019 12:20 PM   Temp 36.7 °C (98.1 °F) 5/7/2019 12:15 PM   Pulse 62 5/7/2019  1:00 PM   Resp 15 5/7/2019 12:15 PM   SpO2 100 % 5/7/2019  1:00 PM   Vitals shown include unvalidated device data.      No case tracking events are documented in the log.      Pain/Kofi Score: Kofi Score: 10 (5/7/2019 12:30 PM)

## 2019-05-07 NOTE — PROGRESS NOTES
05/07/19 1230   Vital Signs   Pulse 69   SpO2 100 %   O2 Device (Oxygen Therapy) room air       Patient took blood pressure cuff off
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
## 2019-05-07 NOTE — PLAN OF CARE
Patient states they are ready to be discharged. Instructions given to patient and family. Both verbalize understanding. Patient tolerating po liquids with no difficulty. Patient states pain is at a tolerable level for them. Anesthesia consent and surgical consent in chart upon patient's discharge from [facility].

## 2019-05-07 NOTE — DISCHARGE INSTRUCTIONS
Colonoscopy     A camera attached to a flexible tube with a viewing lens is used to take video pictures.     Colonoscopy is a test to view the inside of your lower digestive tract (colon and rectum). Sometimes it can show the last part of the small intestine (ileum). During the test, small pieces of tissue may be removed for testing. This is called a biopsy. Small growths, such as polyps, may also be removed.   Why is colonoscopy done?  The test is done to help look for colon cancer. And it can help find the source of abdominal pain, bleeding, and changes in bowel habits. It may be needed once a year, depending on factors such as your:  · Age  · Health history  · Family health history  · Symptoms  · Results from any prior colonoscopy  Risks and possible complications  These include:  · Bleeding               · A puncture or tear in the colon   · Risks of anesthesia  · A cancer lesion not being seen  Getting ready   To prepare for the test:  · Talk with your healthcare provider about the risks of the test (see below). Also ask your healthcare provider about alternatives to the test.  · Tell your healthcare provider about any medicines you take. Also tell him or her about any health conditions you may have.  · Make sure your rectum and colon are empty for the test. Follow the diet and bowel prep instructions exactly. If you dont, the test may need to be rescheduled.  · Plan for a friend or family member to drive you home after the test.     Colonoscopy provides an inside view of the entire colon.     You may discuss the results with your doctor right away or at a future visit.  During the test   The test is usually done in the hospital on an outpatient basis. This means you go home the same day. The procedure takes about 30 minutes. During that time:  · You are given relaxing (sedating) medicine through an IV line. You may be drowsy, or fully asleep.  · The healthcare provider will first give you a physical exam to  check for anal and rectal problems.  · Then the anus is lubricated and the scope inserted.  · If you are awake, you may have a feeling similar to needing to have a bowel movement. You may also feel pressure as air is pumped into the colon. Its OK to pass gas during the procedure.  · Biopsy, polyp removal, or other treatments may be done during the test.  After the test   You may have gas right after the test. It can help to try to pass it to help prevent later bloating. Your healthcare provider may discuss the results with you right away. Or you may need to schedule a follow-up visit to talk about the results. After the test, you can go back to your normal eating and other activities. You may be tired from the sedation and need to rest for a few hours.  Date Last Reviewed: 11/1/2016 © 2000-2017 [employer]. [address] [place] [address]. All rights reserved. This information is not intended as a substitute for professional medical care. Always follow your healthcare professional's instructions.    PATIENT INSTRUCTIONS  POST-ANESTHESIA    IMMEDIATELY FOLLOWING SURGERY:  Do not drive or operate machinery for the first twenty four hours after surgery.  Do not make any important decisions for twenty four hours after surgery or while taking narcotic pain medications or sedatives.  If you develop intractable nausea and vomiting or a severe headache please notify your doctor immediately.    FOLLOW-UP:  Please make an appointment with your surgeon as instructed. You do not need to follow up with anesthesia unless specifically instructed to do so.    WOUND CARE INSTRUCTIONS (if applicable):  Keep a dry clean dressing on the anesthesia/puncture wound site if there is drainage.  Once the wound has quit draining you may leave it open to air.  Generally you should leave the bandage intact for twenty four hours unless there is drainage.  If the epidural site drains for more than 36-48 hours please call  the anesthesia department.    QUESTIONS?:  Please feel free to call your physician or the hospital  if you have any questions, and they will be happy to assist you.       [facility] Anesthesia Department  [address] [facility]  [phone]

## 2019-05-13 ENCOUNTER — CLINICAL SUPPORT (OUTPATIENT)
Dept: CARDIOLOGY | Facility: HOSPITAL | Age: 54
End: 2019-05-13
Attending: INTERNAL MEDICINE
Payer: MEDICARE

## 2019-05-13 DIAGNOSIS — Z95.810 AICD (AUTOMATIC CARDIOVERTER/DEFIBRILLATOR) PRESENT: ICD-10-CM

## 2019-05-14 DIAGNOSIS — G43.711 CHRONIC MIGRAINE WITHOUT AURA, WITH INTRACTABLE MIGRAINE, SO STATED, WITH STATUS MIGRAINOSUS: Primary | ICD-10-CM

## 2019-05-17 ENCOUNTER — HOSPITAL ENCOUNTER (OUTPATIENT)
Dept: DIABETES | Facility: OTHER | Age: 54
Discharge: HOME | End: 2019-05-17
Attending: INTERNAL MEDICINE
Payer: MEDICARE

## 2019-05-17 VITALS — HEIGHT: 64 IN | BODY MASS INDEX: 49.57 KG/M2 | WEIGHT: 290.38 LBS

## 2019-05-17 DIAGNOSIS — E11.8 TYPE 2 DIABETES MELLITUS WITH COMPLICATION, UNSPECIFIED WHETHER LONG TERM INSULIN USE: Primary | ICD-10-CM

## 2019-05-17 DIAGNOSIS — R73.03 PREDIABETES: ICD-10-CM

## 2019-05-17 DIAGNOSIS — E66.01 OBESITY, CLASS III, BMI 40-49.9 (MORBID OBESITY): Primary | ICD-10-CM

## 2019-05-17 NOTE — PROGRESS NOTES
Diabetes Education  Author: Sonali Hansen RN  Date: 5/17/2019    Diabetes Care Management Summary  Diabetes Education Record Assessment/Progress: Initial  Current Diabetes Risk Level: Low         Diabetes Type  Diabetes Type : Pre-Diabetes    Diabetes History  Diabetes Diagnosis: 0-1 year(was on insulin in 2013, but able to wean off and keep normal A1C)  Current Treatment: Diet  Reviewed Problem List with Patient: Yes    Health Maintenance was reviewed today with patient. Discussed with patient importance of routine eye exams, foot exams/foot care, blood work (i.e.: A1c, microalbumin, and lipid), dental visits, yearly flu vaccine, and pneumonia vaccine as indicated by PCP. Patient verbalized understanding.     Health Maintenance Topics with due status: Not Due       Topic Last Completion Date    Pap Smear with HPV Cotest 11/04/2016    Influenza Vaccine 09/30/2018    TETANUS VACCINE 10/02/2018    Mammogram 10/03/2018    Lipid Panel 01/28/2019    High Dose Statin 05/07/2019    Colonoscopy 05/07/2019     There are no preventive care reminders to display for this patient.    Nutrition  Meal Plan 24 Hour Recall - Breakfast: grits, victoria, blueberry muffin - coffee w/ equal + milk (usually just has coffee)  Meal Plan 24 Hour Recall - Lunch: chicken tenders from [employer] w/ apple pie - ginger ale   Meal Plan 24 Hour Recall - Dinner: fried chicken livers, pineapple cold drink   Meal Plan 24 Hour Recall - Snack: mr. altman     Monitoring   Self Monitoring : not checking now  Blood Glucose Logs: No  Do you use a personal continuous glucose monitor?: No    Exercise   Frequency: Never    Current Diabetes Treatment   Current Treatment: Diet    Social History  Preferred Learning Method: Reading Materials  Primary Support: Family  Educational Level: Some College  Occupation: disability   Smoking Status: Never a Smoker  Alcohol Use: Never                                Barriers to Change  Barriers to Change: None  Learning Challenges  : None    Readiness to Learn   Readiness to Learn : Eager    Cultural Influences  Cultural Influences: No    Diabetes Education Assessment/Progress  Diabetes Disease Process (diabetes disease process and treatment options): Discussion, Requires Assistance Family/SO, Individual Session, Written Materials Provided(ed on insulin resistance and role of lifestyle changes and medications as needed to help keep BG well controlled)  Nutrition (Incorporating nutritional management into one's lifestyle): Discussion, Requires Assistance Family/SO, Individual Session, Written Materials Provided(instructed to eliminate sweet drinks from diet - label reading exercise about added sugar - ed on plate method and appropriate portion sizes, pt working on reducing skipped meals to help keep portions smaller)  Physical Activity (incorporating physical activity into one's lifestyle): Discussion, Requires Assistance Family/SO, Individual Session, Written Materials Provided(ed on ADA recs for physical activity and safety considerations)  Medications (states correct name, dose, onset, peak, duration, side effects & timing of meds): Discussion, Requires Assistance Family/SO, Individual Session, No Knowledge(ed on how metformin works to reduce insulin resistance if needed)  Monitoring (monitoring blood glucose/other parameters & using results): Discussion, Requires Assistance Family/SO, Individual Session, Written Materials Provided(pt would like to start monitoring BG - reviewed BG goals, SMBG schedule and importance of using BG to trend progress w/ control)  Acute Complications (preventing, detecting, and treating acute complications): Not Covered/Deferred  Chronic Complications (preventing, detecting, and treating chronic complications): Discussion, Written Materials Provided, Individual Session, Requires Assistance Family/SO(ed on current A1C, goal A1C and importance of keeping BG well controlled to prevent complications r/t DM)  Clinical  (diabetes, other pertinent medical history, and relevant comorbidities reviewed during visit): Discussion, Requires Assistance Family/SO, Individual Session(ed on importance of weight loss to reduce insulin resistance)  Cognitive (knowledge of self-management skills, functional health literacy): Discussion, Requires Assistance Family/SO, Individual Session  Psychosocial (emotional response to diabetes): Discussion, Requires Assistance Family/SO, Individual Session  Diabetes Distress and Support Systems: Discussion, Requires Assistance Family/SO, Individual Session  Behavioral (readiness for change, lifestyle practices, self-care behaviors): Discussion, Requires Assistance Family/SO, Individual Session(pt very motivated to change habits to help get health back on track )    Goals  Patient has selected/evaluated goals during today's session: Yes, selected  Healthy Eating: Set(pt will elminate sweet drinks from diet)  Start Date: 05/17/19  Physical Activity: Set(pt will work up to 30 minutes of physical activity per day)  Start Date: 05/17/19  Monitoring: Set(pt will smbg BID and bring logs to all future appointments)  Start Date: 05/17/19         Diabetes Care Plan/Intervention  Education Plan/Intervention: Individual Follow-Up DSMT    Diabetes Meal Plan  Restrictions: Restricted Carbohydrate  Carbohydrate Per Meal: 30-45g    Today's Self-Management Care Plan was developed with the patient's input and is based on barriers identified during today's assessment.    The long and short-term goals in the care plan were written with the patient/caregiver's input. The patient has agreed to work toward these goals to improve her overall diabetes control.      The patient received a copy of today's self-management plan and verbalized understanding of the care plan, goals, and all of today's instructions.      The patient was encouraged to communicate with her physician and care team regarding her condition(s) and treatment.  I  provided the patient with my contact information today and encouraged her to contact me via phone or patient portal as needed.     Education Units of Time   Time Spent: 60 min

## 2019-05-20 RX ORDER — LANCETS
1 EACH MISCELLANEOUS 2 TIMES DAILY
Qty: 200 EACH | Refills: 3 | Status: SHIPPED | OUTPATIENT
Start: 2019-05-20

## 2019-05-20 RX ORDER — INSULIN PUMP SYRINGE, 3 ML
EACH MISCELLANEOUS
Qty: 1 EACH | Refills: 0 | Status: SHIPPED | OUTPATIENT
Start: 2019-05-20

## 2019-05-21 ENCOUNTER — TELEPHONE (OUTPATIENT)
Dept: INTERNAL MEDICINE | Facility: CLINIC | Age: 54
End: 2019-05-21

## 2019-05-21 ENCOUNTER — OFFICE VISIT (OUTPATIENT)
Dept: INTERNAL MEDICINE | Facility: CLINIC | Age: 54
End: 2019-05-21
Payer: MEDICARE

## 2019-05-21 VITALS
OXYGEN SATURATION: 99 % | SYSTOLIC BLOOD PRESSURE: 136 MMHG | DIASTOLIC BLOOD PRESSURE: 88 MMHG | HEIGHT: 64 IN | BODY MASS INDEX: 49.57 KG/M2 | HEART RATE: 63 BPM | WEIGHT: 290.38 LBS

## 2019-05-21 DIAGNOSIS — Z79.01 LONG TERM (CURRENT) USE OF ANTICOAGULANTS: ICD-10-CM

## 2019-05-21 DIAGNOSIS — G47.33 OSA (OBSTRUCTIVE SLEEP APNEA): ICD-10-CM

## 2019-05-21 DIAGNOSIS — E53.8 B12 DEFICIENCY: ICD-10-CM

## 2019-05-21 DIAGNOSIS — M54.5 CHRONIC BILATERAL LOW BACK PAIN, WITH SCIATICA PRESENCE UNSPECIFIED: ICD-10-CM

## 2019-05-21 DIAGNOSIS — Z95.0 PACEMAKER: ICD-10-CM

## 2019-05-21 DIAGNOSIS — Z86.74 HISTORY OF SUDDEN CARDIAC ARREST: ICD-10-CM

## 2019-05-21 DIAGNOSIS — I10 ESSENTIAL HYPERTENSION: Primary | Chronic | ICD-10-CM

## 2019-05-21 DIAGNOSIS — D64.9 ANEMIA, UNSPECIFIED TYPE: ICD-10-CM

## 2019-05-21 DIAGNOSIS — M54.81 BILATERAL OCCIPITAL NEURALGIA: ICD-10-CM

## 2019-05-21 DIAGNOSIS — I48.0 PAROXYSMAL ATRIAL FIBRILLATION: ICD-10-CM

## 2019-05-21 DIAGNOSIS — G47.8 UPPER AIRWAY RESISTANCE SYNDROME: ICD-10-CM

## 2019-05-21 DIAGNOSIS — I44.2 COMPLETE AV BLOCK: ICD-10-CM

## 2019-05-21 DIAGNOSIS — G43.711 CHRONIC MIGRAINE WITHOUT AURA, WITH INTRACTABLE MIGRAINE, SO STATED, WITH STATUS MIGRAINOSUS: ICD-10-CM

## 2019-05-21 DIAGNOSIS — Z95.810 BIVENTRICULAR AUTOMATIC IMPLANTABLE CARDIOVERTER DEFIBRILLATOR IN SITU: ICD-10-CM

## 2019-05-21 DIAGNOSIS — R73.03 PREDIABETES: ICD-10-CM

## 2019-05-21 DIAGNOSIS — T23.129A: Primary | ICD-10-CM

## 2019-05-21 DIAGNOSIS — E55.9 VITAMIN D DEFICIENCY: ICD-10-CM

## 2019-05-21 DIAGNOSIS — G89.29 CHRONIC BILATERAL LOW BACK PAIN, WITH SCIATICA PRESENCE UNSPECIFIED: ICD-10-CM

## 2019-05-21 DIAGNOSIS — Z86.73 HISTORY OF CVA (CEREBROVASCULAR ACCIDENT): ICD-10-CM

## 2019-05-21 DIAGNOSIS — E66.01 OBESITY, CLASS III, BMI 40-49.9 (MORBID OBESITY): ICD-10-CM

## 2019-05-21 DIAGNOSIS — F33.1 DEPRESSION, MAJOR, RECURRENT, MODERATE: ICD-10-CM

## 2019-05-21 DIAGNOSIS — E78.2 MIXED HYPERLIPIDEMIA: ICD-10-CM

## 2019-05-21 PROCEDURE — 99214 PR OFFICE/OUTPT VISIT, EST, LEVL IV, 30-39 MIN: ICD-10-PCS | Mod: S$PBB,,, | Performed by: INTERNAL MEDICINE

## 2019-05-21 PROCEDURE — 99999 PR PBB SHADOW E&M-EST. PATIENT-LVL IV: ICD-10-PCS | Mod: PBBFAC,,, | Performed by: INTERNAL MEDICINE

## 2019-05-21 PROCEDURE — 99214 OFFICE O/P EST MOD 30 MIN: CPT | Mod: S$PBB,,, | Performed by: INTERNAL MEDICINE

## 2019-05-21 PROCEDURE — 99214 OFFICE O/P EST MOD 30 MIN: CPT | Mod: PBBFAC | Performed by: INTERNAL MEDICINE

## 2019-05-21 PROCEDURE — 99999 PR PBB SHADOW E&M-EST. PATIENT-LVL IV: CPT | Mod: PBBFAC,,, | Performed by: INTERNAL MEDICINE

## 2019-05-21 RX ORDER — ONDANSETRON 4 MG/1
4 TABLET, ORALLY DISINTEGRATING ORAL EVERY 12 HOURS PRN
Qty: 10 TABLET | Refills: 1 | Status: SHIPPED | OUTPATIENT
Start: 2019-05-21 | End: 2019-08-01 | Stop reason: SDUPTHER

## 2019-05-21 RX ORDER — BACLOFEN 20 MG/1
20 TABLET ORAL 3 TIMES DAILY
Qty: 90 TABLET | Refills: 11 | Status: ON HOLD | OUTPATIENT
Start: 2019-05-21 | End: 2020-07-22 | Stop reason: HOSPADM

## 2019-05-21 NOTE — PROGRESS NOTES
INTERNAL MEDICINE ESTABLISHED PATIENT VISIT NOTE    Subjective:     Chief Complaint: Follow-up  HTN, fatigue     Patient ID: Amna Chawla is a 53 y.o. female with hx CVA and TIA c residual cognitive deficits (most recently c TIA 9/2018 per pt, has mild B weakness from several strokes in the past), carotid a disease, HTN, paroxysmal A fib c LAE, hx sudden cardiac arrest c VF and no ischemic heart disease and preserved EF (2013), intermittent 3rd deg AV block and symptomatic LVEF of 40% in setting of normal PET stress and chronic RV pacing s/p CRT-D, HLD, thyroid nodule s/p FNA 7/2018 c path c/w benign follicular nodule, depression with anxiety, chronic complex h/a c occipital neuralgia followed by Neuro and on mult meds and has also had tx c botox, GERD c hiatal hernia, B carpal tunnel s/p release B, SHIRA on APAP 6-20cm followed in sleep clinic, insomnia, prediabetes, last seen by me in Jan, here today for f/u.    At last appt, c/o fatigue.  Was advised to resume APAP but says she has had issues getting her machine.    Since last appt, also had +FIT kit and had csc done with polyps x2 removed but path still pending.    Past Medical History:  Past Medical History:   Diagnosis Date    Anticoagulant long-term use     Anxiety     Asthma     Atrial fibrillation     Brain anoxic injury     Cervicalgia 8/28/2014    CHI (closed head injury) 2/19/2013    Convulsion 5/30/2015    Decreased ROM of left shoulder 4/12/2017    Defibrillator activation 2013    Depression     Heart block     History of sudden cardiac arrest 2/2013    PEA arrest with subsequent long-QT    Hx of psychiatric care     Hypertension     Left atrial enlargement 4/11/2018    Pacemaker 2013    Paresthesia 11/1/2013    Prolonged Q-T interval on ECG 2/8/2013    Psychiatric problem     Seizures     Sleep difficulties     Stroke     weakness lt side    Therapy     Thyroid disease     Upper airway resistance syndrome 2/21/2017        Home Medications:  Prior to Admission medications    Medication Sig Start Date End Date Taking? Authorizing Provider   ARIPiprazole (ABILIFY) 10 MG Tab TAKE 1 TABLET(5 MG) BY MOUTH EVERY EVENING 4/4/19  Yes Kade Huffman III, NP   blood sugar diagnostic Strp 1 strip by Misc.(Non-Drug; Combo Route) route 2 (two) times daily. 5/20/19  Yes Tiffany Mina MD   blood-glucose meter kit Please provide with insurance covered meter 5/20/19  Yes Tiffany Mina MD   carvedilol (COREG) 25 MG tablet TAKE 1 TABLET(25 MG) BY MOUTH TWICE DAILY WITH MEALS 8/21/18  Yes Rin Martins MD   clonazePAM (KLONOPIN) 1 MG tablet Take 1 tablet (1 mg total) by mouth daily as needed for Anxiety. 4/4/19  Yes Kade Huffman III, NP   clopidogrel (PLAVIX) 75 mg tablet TAKE 1 TABLET BY MOUTH ONCE DAILY 3/26/19  Yes Perlita Ruth MD   donepezil (ARICEPT) 10 MG tablet Take 1 tablet (10 mg total) by mouth 2 (two) times daily. 7/18/17  Yes Toby Paredes MD   DULoxetine (CYMBALTA) 60 MG capsule Take 1 capsule (60 mg total) by mouth 2 (two) times daily. 4/4/19  Yes Kade Huffman III, NP   fremanezumab-vfrm (AJOVY) 225 mg/1.5 mL Syrg Inject 225 mg into the skin every 28 days. 3/13/19  Yes Daivd Cortez MD   gabapentin (NEURONTIN) 300 MG capsule Take 3 capsules (900 mg total) by mouth 3 (three) times daily. 11/30/16  Yes David Cortez MD   hydrOXYzine (ATARAX) 50 MG tablet Take 1 tablet (50 mg total) by mouth nightly as needed. 4/4/19  Yes Kade Huffman III, NP   hydrOXYzine HCl (ATARAX) 10 MG Tab Take 1 tablet (10 mg total) by mouth 3 (three) times daily as needed (anxiety). 4/4/19  Yes Kade Huffman III, NP   lancets Misc 1 Device by Misc.(Non-Drug; Combo Route) route 2 (two) times daily. 5/20/19  Yes Tiffany Mina MD   losartan (COZAAR) 50 MG tablet Take 1 tablet (50 mg total) by mouth once daily. 3/20/19 3/19/20 Yes Avelino Mejia MD   magnesium 200 mg Tab Take 200 mg by mouth once daily.  Patient taking differently: Take 200  mg by mouth every other day.  3/7/18  Yes David Cortez MD   ondansetron (ZOFRAN-ODT) 4 MG TbDL Take 1 tablet (4 mg total) by mouth every 12 (twelve) hours as needed (nausea). 11/6/18  Yes Mariel Tomlinson NP   pantoprazole (PROTONIX) 40 MG tablet Take 1 tablet (40 mg total) by mouth once daily. 7/20/15  Yes MARIO Wood   rivaroxaban (XARELTO) 20 mg Tab Take 1 tablet (20 mg total) by mouth daily with dinner or evening meal. 1/10/19  Yes Avelino Mejia MD   rosuvastatin (CRESTOR) 40 MG Tab Take 1 tablet (40 mg total) by mouth every evening. 8/21/18 8/21/19 Yes Rin Martins MD   tiaGABine (GABITRIL) 4 MG tablet Take 1 tablet (4 mg total) by mouth every evening. 3/7/18  Yes David Cortez MD   zaleplon (SONATA) 10 MG capsule Take 1 capsule (10 mg total) by mouth every evening. 4/23/19 5/23/19 Yes Kade Huffman III, NP   LORazepam (ATIVAN) 0.5 MG tablet Take 1 tablet (0.5 mg total) by mouth every 6 (six) hours as needed for Anxiety. 4/2/19 5/2/19  Jewell Hankins MD   zolpidem (AMBIEN CR) 12.5 MG CR tablet TAKE 1 TABLET BY MOUTH NIGHTLY AS NEEDED FOR INSOMNIA 4/4/19 4/23/19  Kade Huffman III, NP       Allergies:  Review of patient's allergies indicates:   Allergen Reactions    Aspirin Hives    Imitrex [sumatriptan] Palpitations    Penicillins Hives and Swelling    Shellfish containing products Anaphylaxis     seafood    Percocet [oxycodone-acetaminophen] Itching     States can take    Reglan [metoclopramide hcl] Other (See Comments)     Parkinsonism        Social History:  Social History     Tobacco Use    Smoking status: Never Smoker    Smokeless tobacco: Never Used   Substance Use Topics    Alcohol use: No    Drug use: No        Review of Systems   Constitutional: Negative for chills, fatigue and fever.   Respiratory: Negative for cough, chest tightness and shortness of breath.    Cardiovascular: Negative for chest pain.   Gastrointestinal: Negative for abdominal pain and blood in  "stool.   Genitourinary: Negative for dysuria and frequency.         Health Maintenance:   Immunizations:   Influenza 9/2018  TDap 10/2/2018  Pneumovax 9/2018 here per pt  Shingrix rec once back in stock     Cancer Screening:  PAP: 11/2017, has f/u c Dr. Vasquez this Fall  Mammogram:  10/3/2018 benign  Colonoscopy:  5/2019, polyps x2 removed, awaiting path.    Objective:   BP (!) 142/90 (BP Location: Left arm, Patient Position: Sitting, BP Method: Large (Manual))   Pulse 63   Ht 5' 4" (1.626 m)   Wt 131.7 kg (290 lb 5.5 oz)   SpO2 99%   BMI 49.84 kg/m²        General: AAO x3, no apparent distress  CV: RRR, no m/r/g  Pulm: Lungs CTAB, no crackles, no wheezes  Abd: s/NT/ND +BS  Extremities: no c/c/e    Labs:         Assessment/Plan     Amna was seen today for follow-up.    Diagnoses and all orders for this visit:    Essential hypertension  Elevated initially but repeat at goal, usually well controlled.  Cont losartan and coreg.  -     Comprehensive metabolic panel; Future    Mixed hyperlipidemia  Lab Results   Component Value Date    LDLCALC 65.4 05/17/2019   well controlled on recent labs from cards.  Cont Crestor.    Complete AV block  Paroxysmal atrial fibrillation  History of sudden cardiac arrest  Pacemaker  Biventricular automatic implantable cardioverter defibrillator in situ  Long term (current) use of anticoagulants  Mgmt as per Dr. Mejia, on xarelto based on cardiac and stroke hx    History of CVA (cerebrovascular accident)  Cont secondary prevention as above    Bilateral occipital neuralgia  Chronic migraine without aura, with intractable migraine, so stated, with status migrainosus  Complex hx followed by Neuro, has been getting botox injections, cont f/u  -     ondansetron (ZOFRAN-ODT) 4 MG TbDL; Take 1 tablet (4 mg total) by mouth every 12 (twelve) hours as needed (nausea).    Obesity, Class III, BMI 40-49.9 (morbid obesity)  Counseled extensively on need for diet changes and daily exercise.  " Discussed examples of healthy eating for breakfast, lunch, and dinner.  Recommend at least 30 minutes of exercise at least 5 days a week.      SHIRA (obstructive sleep apnea)  Needs apap machine, has had issues c fatigue  -     Ambulatory referral to Sleep Disorders    Depression, major, recurrent, moderate  Mgmt as per psych    Anemia, unspecified type  Mild, ferritin low normal  Csc now utd  Will monitor.  -     CBC auto differential; Future    Vitamin D deficiency  Completed ergo, now rec otc D3 2000 daily  -     Vitamin D; Future    Prediabetes  Lab Results   Component Value Date    HGBA1C 6.1 (H) 01/28/2019     Worse on last check, will repeat labs now  Seen by DM education.  Pt was counseled on the need to avoid intake of simple sugars and minimize carbohydrates.  Also recommended weight loss and daily exercise.  -     Hemoglobin A1c; Future    B12 deficiency  Mild, rec otc MVI  -     Vitamin B12; Future    Chronic bilateral low back pain, with sciatica presence unspecified  As per HPI  Reports baclofen rx'ed by another MD has helped c back issues in the past, ok to fill if pt takes sparingly  -     baclofen (LIORESAL) 20 MG tablet; Take 1 tablet (20 mg total) by mouth 3 (three) times daily.    HM as above  RTC in 4 mos for f/u, labs today      Tiffany Mina MD  Department [facility] Medicine [facility] [place]  05/21/2019

## 2019-05-21 NOTE — TELEPHONE ENCOUNTER
----- Message from Tiffany Mina MD sent at 5/21/2019  1:16 PM CDT -----  Please call pathology for results of colonoscopy pathology.  Thanks.

## 2019-05-22 RX ORDER — SILVER SULFADIAZINE 10 G/1000G
CREAM TOPICAL 2 TIMES DAILY
Qty: 50 G | Refills: 0 | Status: SHIPPED | OUTPATIENT
Start: 2019-05-22 | End: 2021-06-03 | Stop reason: ALTCHOICE

## 2019-05-22 NOTE — TELEPHONE ENCOUNTER
Pt notified of lab results.  rec D3 2000 daily and B12 500 mcg daily.  Also c/o burning herself c a hot glue gun.  Small piece of skin came off her finger.  Will send rx for silvadene.

## 2019-05-23 ENCOUNTER — PROCEDURE VISIT (OUTPATIENT)
Dept: NEUROLOGY | Facility: CLINIC | Age: 54
End: 2019-05-23
Payer: MEDICARE

## 2019-05-23 ENCOUNTER — OFFICE VISIT (OUTPATIENT)
Dept: CARDIOLOGY | Facility: CLINIC | Age: 54
End: 2019-05-23
Payer: MEDICARE

## 2019-05-23 VITALS
BODY MASS INDEX: 49.38 KG/M2 | HEART RATE: 81 BPM | DIASTOLIC BLOOD PRESSURE: 78 MMHG | WEIGHT: 289.25 LBS | SYSTOLIC BLOOD PRESSURE: 161 MMHG | HEIGHT: 64 IN

## 2019-05-23 VITALS
WEIGHT: 289.69 LBS | HEIGHT: 64 IN | BODY MASS INDEX: 49.46 KG/M2 | DIASTOLIC BLOOD PRESSURE: 89 MMHG | SYSTOLIC BLOOD PRESSURE: 158 MMHG | HEART RATE: 64 BPM

## 2019-05-23 DIAGNOSIS — K21.9 GASTROESOPHAGEAL REFLUX DISEASE WITHOUT ESOPHAGITIS: ICD-10-CM

## 2019-05-23 DIAGNOSIS — I44.2 COMPLETE AV BLOCK: ICD-10-CM

## 2019-05-23 DIAGNOSIS — D50.8 OTHER IRON DEFICIENCY ANEMIA: Primary | ICD-10-CM

## 2019-05-23 DIAGNOSIS — I48.0 PAROXYSMAL ATRIAL FIBRILLATION: ICD-10-CM

## 2019-05-23 DIAGNOSIS — Z95.0 PACEMAKER: ICD-10-CM

## 2019-05-23 DIAGNOSIS — Z86.74 HISTORY OF SUDDEN CARDIAC ARREST: ICD-10-CM

## 2019-05-23 DIAGNOSIS — Z79.01 LONG TERM (CURRENT) USE OF ANTICOAGULANTS: ICD-10-CM

## 2019-05-23 DIAGNOSIS — E66.01 OBESITY, CLASS III, BMI 40-49.9 (MORBID OBESITY): ICD-10-CM

## 2019-05-23 DIAGNOSIS — M54.81 BILATERAL OCCIPITAL NEURALGIA: ICD-10-CM

## 2019-05-23 DIAGNOSIS — G43.711 CHRONIC MIGRAINE WITHOUT AURA, WITH INTRACTABLE MIGRAINE, SO STATED, WITH STATUS MIGRAINOSUS: Primary | ICD-10-CM

## 2019-05-23 DIAGNOSIS — G47.33 OSA (OBSTRUCTIVE SLEEP APNEA): ICD-10-CM

## 2019-05-23 DIAGNOSIS — R29.898 DECREASED ROM OF NECK: Chronic | ICD-10-CM

## 2019-05-23 DIAGNOSIS — I42.8 NONISCHEMIC CARDIOMYOPATHY: ICD-10-CM

## 2019-05-23 DIAGNOSIS — Z86.73 HISTORY OF CVA (CEREBROVASCULAR ACCIDENT): Primary | ICD-10-CM

## 2019-05-23 DIAGNOSIS — I10 ESSENTIAL HYPERTENSION: Chronic | ICD-10-CM

## 2019-05-23 PROCEDURE — 64615 CHEMODENERV MUSC MIGRAINE: CPT | Mod: PBBFAC | Performed by: PSYCHIATRY & NEUROLOGY

## 2019-05-23 PROCEDURE — 99213 OFFICE O/P EST LOW 20 MIN: CPT | Mod: PBBFAC | Performed by: INTERNAL MEDICINE

## 2019-05-23 PROCEDURE — 64615 CHEMODENERV MUSC MIGRAINE: CPT | Mod: S$PBB,,, | Performed by: PSYCHIATRY & NEUROLOGY

## 2019-05-23 PROCEDURE — 96372 THER/PROPH/DIAG INJ SC/IM: CPT | Mod: PBBFAC

## 2019-05-23 PROCEDURE — 64615 PR CHEMODENERVATION OF MUSCLE FOR CHRONIC MIGRAINE: ICD-10-PCS | Mod: S$PBB,,, | Performed by: PSYCHIATRY & NEUROLOGY

## 2019-05-23 PROCEDURE — 99999 PR PBB SHADOW E&M-EST. PATIENT-LVL III: ICD-10-PCS | Mod: PBBFAC,,, | Performed by: INTERNAL MEDICINE

## 2019-05-23 PROCEDURE — 99214 PR OFFICE/OUTPT VISIT, EST, LEVL IV, 30-39 MIN: ICD-10-PCS | Mod: S$PBB,,, | Performed by: INTERNAL MEDICINE

## 2019-05-23 PROCEDURE — 99214 OFFICE O/P EST MOD 30 MIN: CPT | Mod: S$PBB,,, | Performed by: INTERNAL MEDICINE

## 2019-05-23 PROCEDURE — 99999 PR PBB SHADOW E&M-EST. PATIENT-LVL III: CPT | Mod: PBBFAC,,, | Performed by: INTERNAL MEDICINE

## 2019-05-23 RX ORDER — PANTOPRAZOLE SODIUM 40 MG/1
40 TABLET, DELAYED RELEASE ORAL DAILY
Qty: 30 TABLET | Refills: 0 | Status: SHIPPED | OUTPATIENT
Start: 2019-05-23 | End: 2019-10-30 | Stop reason: SDUPTHER

## 2019-05-23 RX ORDER — CYANOCOBALAMIN 1000 UG/ML
1000 INJECTION, SOLUTION INTRAMUSCULAR; SUBCUTANEOUS
Status: COMPLETED | OUTPATIENT
Start: 2019-05-23 | End: 2019-05-23

## 2019-05-23 RX ORDER — PANTOPRAZOLE SODIUM 40 MG/1
40 TABLET, DELAYED RELEASE ORAL DAILY
Qty: 30 TABLET | Refills: 11 | Status: CANCELLED | OUTPATIENT
Start: 2019-05-23

## 2019-05-23 RX ORDER — ERGOCALCIFEROL 1.25 MG/1
50000 CAPSULE ORAL
Qty: 6 CAPSULE | Refills: 0 | Status: SHIPPED | OUTPATIENT
Start: 2019-05-23 | End: 2019-06-28

## 2019-05-23 RX ADMIN — ONABOTULINUMTOXINA 200 UNITS: 100 INJECTION, POWDER, LYOPHILIZED, FOR SOLUTION INTRADERMAL; INTRAMUSCULAR at 11:05

## 2019-05-23 RX ADMIN — CYANOCOBALAMIN 1000 MCG: 1000 INJECTION, SOLUTION INTRAMUSCULAR at 12:05

## 2019-05-23 NOTE — PROCEDURES
Follow up:   HA overall stable in Hz and intensity      Prior note:   HA started 12/24   She feels BOTOX wore off last week   Reports GI distress from taking to many motrin      Prior note:   4/week HA - L vertex   Jabs - vertex either sides      She reports migraine that woke her up in the morning - associated with L side facial droop      Prior note:   She gets acceptable relief for 2.5 months after BOTOX      Prior note:   No recurrent stroke symptoms   starting having whole body tremors since this AM. Took 1 tablet of lorazepam   Last night had a HA, took trazodone       Admit Date: 4/11/2018  2:03 PM   Discharge Date and Time: 4/12/2018  8:30 PM   History of Present Illness: Ms. Chawla is a 53 yo F with afib on Eliquis (last dose this am), prior cardiac arrest with associated acute ischemic stroke with residual mild L sided weakness, CAD with ICD in situ, HTN, and HLD who presented with acute R sided weakness and sensation changes.  She originally called EMS for palpitations, but developed acute right sided facial droop and RUE weakness at 1:40pm today, after EMS had arrived.  She denies changes to speech or vision.  SBP en route 200/100.  She is ineligible for tPA 2/2 Eliquis use.  She is not suspected to have an LVO.  She will be admitted to [facility] for observation overnight.     Hospital Course (synopsis of major diagnoses, care, treatment, and services provided during the course of the hospital stay): Ms. Chawla was admitted for acute stroke workup. Pt with pacemaker so unable to obtain MRI Brain; CTA H/N demonstrated no evidence acute infarction, though did exhibit noncalcified plaquing of carotid bifurcations and proximal ICAs with < 50% stenosis, so Plavix was added to pt's daily home regimen. Concerned for TIA at this time though Migraine very high on differential due to pt's hx headaches, including presently this admission. Hypertensive urgency/emergency also considered due to pt's very elevated levels  with EMS. Per chart review, Eliquis was a new medication since 4/3/18 (had switched from Coumadin), however staff Faraz concerned that pt had a potential event while on Eliquis. She wished to switch to Pradaxa as an alternative DOAC (as it has an option for reversal), however changed to Xarelto per pt's insurance coverage.   While admitted, pt given cocktail for HA which she has received previously at the infusion clinic - IV Magnesium, IM Toradol, 2mg IV Morphine, 500cc NS bolus (IV Benadryl not included this time as pt had received a dose earlier that day prior to contrast imaging.) HA improved somewhat and pt was encouraged to follow up with Dr. Cortez for continued management of botox injections, etc. At that time, pt also found with hyponatremia; d/c'd Clorthalidone and plan was made for pt to [facility] 4[facility]16/18 for repeat CMP to evaluate Na level and BP check since discontinuing this medication.  Ms. Chawla was evaluated and treated by PT, OT, and SLP who recommend d/c with outpatient PT and OT. She was discharged home 4/12/18 with [facility] follow-up Monday      Prior note:   2 weeks ago BOTOX effect wore off   Used up all her percocet   3 wks h/o:   Reports frequent falls - falling forward, no LOC, no injuries, able to protect falls by hands   triggers - unknown   Also occ stumbling   Dragging L foot   No Pain in back or legs   No numbness or weakness in legs   No B/B incontinence   No lightheadedness      Prior note:    Flare up of TMJ and HA during daughter's wedding   NSAIDS helped   2 weeks ago BOTOX effect wore off      Prior note:   2 weeks ago BOTOX effect wore off   Has severe spasm and pain in the traps catalina   Weather change has provoked severe migraine + nausea   She started coumadin       Prior note:   3 weeks ago BOTOX effect wore off   She reports severe daily HA    During BOTOX periods she feels she  her HA. Much less intense      Prior note:   The  patient is a 51-year-old female who [facility]. Since her last visit, she reports growing frustration   about the fact that nothing has helped her with regards to her headaches. She   continues to experience daily headaches. She takes mefenamic acid and   tizanidine every night, which only provides her two hours of relief. She is   unable to sleep because of the refractory headaches. She is incapacitated   because of severe headaches. She reports minimal relief with infusions that she  gets on a periodic basis. She does report an improvement overall with the   Botox. However, this effect has worn off in the last two weeks. She also   reports an episode wherein she fell with loss of consciousness, which was not   provoked. As a result, she injured her ankle and is currently wearing a boot.      Prior note:   since last week - Reports vertigo for 6 - 7 minutes upto 3 times daily   Nearly daily severe HA - no response to ponstel , compazine   She is late for her BOTOX - last 2 weeks were painful       Follow up:   R sided Headache is constant max at TMJ on R   Prior note:   She reports new episodes of R face tingling. Sh also reports 2 episodes of blurred vision lasting 15 minutes. These hapended while watching TV. Her pain remains unchanged   Follow up:   2 days of severe HA during Thanksgiving   Pounding bifrontal region   Pain worse over L eye brow   Follow up:   Reports bifrontal severe HA (worse on L) with no nausea with sudden onset . Occurs daily   NO note:  I found no papilledema or other changes to suggest elevated intracranial pressure or other alternative etiology for her headaches. Repeat exam in two years.  HA are less frequent and less intense.   (R levator scapula spasm)   symptoms better with lateral flexion to L   Prior note:   She still has daily HA with severe sharp stabbing pain behind L eye lasting for 15 sec   Prior note:   Daily pain. 2/week -  "nausea.  Shu Lares, RN at 9/17/2014 4:53 PM  Pt presented to clinic for medical advice. Pt is seen by Dr. Paredes and Dr. Cortez.  1) She went ER on 9/13/14 for a migraine. She was given Reglan, Benadryl, MSO4 and IVF. A couple days later she developed an all over body "tremor". She states "I think I have Parkinson's". - Per Dr. Paredes, medication related tremor (Reglan & Benadryl). Informed her it should wear off in a few days. If not, she is to call and let us know.  2) Headaches - triggered by insomnia.   Current meds:  Ketoprofen - she took it once and said her "throat closed up" and she's not supposed to have anything with aspirin in it.  Nortriptyline - she was taking it at night, but it didn't help her sleep, so she stopped it.  Per Dr. Cortez, discontinue Ketoprofen and Nortriptyline. Start Effexor XR 37.5mg QD, tizanidine 4mg BID PRN headache and Klonopin 0.25 - 0.5mg QHS PRN. Will schedule pt for sphenocath procedure within the next week.   Prior note:   48 yo woman with history of HTN and asthma, both reportedly controlled, in hospital early 2013 after "passed out" and became unresponsive. CPR in the field for 8 minutes until EMS arrived. Per ED note, on arrival she was found to be in PEA, given epinephrine. Vfib/Vtach was achieved which was shocked x1. Patient converted to sinus tachycardia after shock. I do not know the time course for the above treatment. On arrival to facility patient was in sinus tachycardia with strong pulses, intubated and unresponsive. ET tube placement was confirmed with direct laryngoscopy and good bilateral breath sounds were heard after the tube was pulled back 2 cm. Reported to have "withdrawal" movements in ER during stabilization, then sedated and cooling protocol initiated. Had remarkable   recovery and first seen in clinic in April 2013.   Headache history: cluster   Age of onset - [age]   Location - behind L eye   Nature of pain - sharp   Prodrome - no   Aura - No " "  Duration of headache - 6-7 min   Time to peak intensity -   Pain scale - range of intensity - 9/10   Frequency - daily   Status Migrainosus history - 1-3 days   Time of day of most headaches- anytime   Associated symptoms with the headache:   Red eyes   swelling of L face   Headache history: Migraine   Age of onset -    Location - bifrontal   Nature of pain - Pounding   Prodrome - no   Aura - No   Duration of headache - > 4 hrs   Time to peak intensity -   Pain scale - range of intensity - 9/10   Frequency - 5/week   Status Migrainosus history - 1-3 days   Time of day of most headaches- anytime   Associated symptoms with the headache:   Meningeal symptoms - photophobia, phonophobia, exercise intolerance +   Nausea/vomitting +   Nasal drainage   Visual blurriness +   Pallor/flushing   Dizziness   Vertigo   Confusion   Impaired concentration +   Pain worsened with physical activity +   Neck pain +   Symptoms of increased intracranial pressure:   Whooshing sounds - no   Visual spots/blotches - no   Headache Triggers: unknown   Other comorbid conditions:   Anxiety - no   Motion sickness symptom - no       Treatment history:   Resolution of headache with sleep - yes       Meds tried:   Trigger points involvin/9/15 helped for 1 day   Site: Right   Levator scapula   Pharmacological agent:   0.5% Sensorcaine solution   No complications were noted.  Supraorbital block - helped for 2 days   Tylenol - not help   imitrex - chest tightness   alleve - help   topamax - not helping   Neurontin - not helping   Paxil, Elavil - not help   seroquel - nightmare   Ketoprofen - she took it once and said her "throat closed up" and she's not supposed to have anything with aspirin in it.  Nortriptyline - she was taking it at night, but it didn't help her sleep, so she stopped it.  reglan - parkinsonism   zanaflex - help   Toradol 30 mg IM inj at home - too expensive   BOTOX round #1 9/3/15 - helped (R levator scapula spasm)   Round " #2 12/3/15 - helped   Round#3 3/316 - helped   Round #4 6/1/16 - helped   BOTOX#5 9/15/16 - helped     BOTOX#6 - 11/30/16 - helped   BOTOX#7 - 3/9/17 - helped    BOTOX#8 - 5/25/17 - helped    BOTOX#9 8/10/17 - helped   BOTOX#10 10/19/17 - helped   BOTOX#11 12/27/17 - helped   BOTOX#12 3/7/18 - helped   BOTOX#13 5/16/18 - helped    BOTOX#14 8/1/18 - helped     BOTOX#15 10/19/18 - helped      BOTOX#16 12/28/18 - helped   BOTOX#17 3/13/19 - helped     AIMOVIG (sept 1rst week, Oct first week, Nov first wk 140 mg, Dec first wk 140 mg, Jan, Feb) no benefit   Constipation +      Can not do RFA - pt has pacemaker   Procedure: IOVERA peripheral nerve focus cold therapy (non ablative cryotherapy) 12/30/15 - not help   Indication: Cryotherapy of select peripheral nerves of the head was performed to treat chronic headaches that failed to respond to several preventive pharmacological therapies.   Sedation: None   Informed consent: The procedure, risks, benefits and options were discussed with the patient. There are no contraindications to the procedure. The patient expressed understanding and agreed to the procedure. I verify that I personally obtained the patient's consent prior to the start of the procedure.  Location:  Bilateral Supra orbital nerve (3 cycles on R and 1 cycle on L)   Bilateral Occipital nerve   3 cycles   Pain relief: Pain score reduced 10/10->0/10   9/26 Bilateral Sphenopalatine Ganglion and bilateral Maxillary Branch (Trigeminal Nerve) Block - no help   ONB - not help                                                                                                                                                 Shannon Pagan, RN at 12/31/2014 3:54 PM              Status: Signed                          Expand All Collapse All   HEADACHE FASTTRACK  PAIN 7/10 NAUSEA 0/10  ROUND 1: TORADOL, IMITREX, MORPHINE, BENADRYL, AND MAGNESIUM  PAIN 7/10 NAUSEA 0/10  PT STATED SHE WAS READY TO GO HOME AND GO TO SLEEP. PT  D/C'ED IN NAD                 Shannon Pagan RN at 10/5/2015 12:49 PM              Status: Signed                          Expand All Collapse All   HEADACHE FASTTRACK  PAIN 8/10 NAUSEA 10/10  FIRST ROUND: tORADOL, BENADRYL, COMPAZINE, IMITREX, MAGNESIUM, AND VALPROATE.  PAIN 1/10 NAUSEA 0/10  PT READY TO GO HOME AND FEELING BETTER. PT LEFT IN NAD WITH FAMILY MEMBER  TOTAL TIME: [id number]            Shannon Pagan RN at 10/20/2015 3:05 PM              Status: Signed                          Expand All Collapse All   HEADACHE FASTTRACK  PAIN 10/10 NAUSEA 0/10  FIRST ROUND: tORADOL, BENADRYL, COMPAZINE, IMITREX, MAGNESIUM, AND VALPROATE.  BP BEING MONITORED EVERY 15 MIN AND REMAINED 170'S/80-90'S. DR CHOPRA NOTIFIED AND STATED TO MAKE SURE BP DID NOT EXCEED 180 SYSTOLIC OR PT SHOULD REPORT TO ED. BP AT 1500 183/92. DR CHOPRA NOTIFIED AND GAVE ORDER TO STOP INFUSION AND SEND PATIENT TO ED. PT BROUGHT TO ED BY INFUSION NURSE VIA WHEELCHAIR.   PAIN 8/10 NAUSEA 0/10  TOTAL TIME: 1258-8718                           Progress Notes                                  Shannon Pagan RN at 2/24/2016 1:36 PM       Status: Signed                  Expand All Collapse All   HEADACHE FASTTRACK  PAIN 10/10 NAUSEA 7/10  FIRST ROUND: tORADOL, BENADRYL, AND MORPHINE-BP RANGING FROM 177-203/84-92, HR 60-82  MD NOTIFIED AND GAVE ORDERS TO SEND TO ED. PT LEFT VIA W/C WITH INFUSION NURSE ON WAY TO ED.            Headache risk factors:   H/o TBI - [facility] (2013) + neck sprain   H/o Meningitis - no   F/h Aneurysms - no       Review of Systems   Constitutional: Negative.   HENT: Negative.   Eyes: Negative.   Respiratory: Negative.   Cardiovascular: Negative.   Gastrointestinal: Negative.   Endocrine: Negative.   Genitourinary: Negative.   Musculoskeletal: Negative.   Skin: Negative.   Allergic/Immunologic: Negative.   Hematological: Negative.   Psychiatric/Behavioral: insomnia      Objective:     Physical Exam   Constitutional: She is oriented to person,  place, and time. She appears well-developed.   obesity   Psychiatric: She has a normal mood and flat affect. Her behavior is normal. Judgment and thought content normal.   Headache Exam:  Optic disc is flat OU   Temples appear symmetric  No V1,V2,V3 distribution allodynia/hyperalgesia catalina   No facial swelling  No gross TMJ abnormalities   No ptosis   No conj injection   No meningismus   No neck stiffness  No C spine tenderness  Cervical facet tenderness on palpation catalina   No tender points over trapezium   catalina supraorbital nerve exit point tenderness  catalina occipital nerve exit point tenderness  No auriculotemporal nerve tenderness   Tenderness of levator scapula catalina      Gait - wide based gait   Tremor in hands, legs, voice and neck                        Assessment:       1.  Occipital neuralgia       2.  Cervicalgia       3.  4  5  6 Chronic migraine without aura, with intractable migraine, so stated, with status migrainosus (post traumatic) post concussive?  Depression   TMJ - R sided   Insomnia - SHIRA      Head CT  Findings: There is no evidence of acute or recent major vascular territory cerebral infarction, parenchymal hemorrhage, or intra-axial mass.  There are no extra-axial masses or abnormal fluid collections.  The ventricular system is within normal limits of size for age and shows no distortion by mass-effect or evidence of hydrocephalus.  There is no fracture or destructive osseous lesion.  The extracranial soft tissues are unremarkable.  The visualized paranasal sinuses and mastoid air cells are clear.   Impression    No acute intracranial abnormality.  ______________________________________     Electronically signed by resident: KRISSY MAYERS MD  Date: 03/14/16  Time: 17:09                                               Plan:       1. Prophylactic medication -   Despiramine 25 mg AM (for dizziness)   Cymbalta 120 mg daily   Aricept 20 mg daily   Neurontin 900 mg TID    Magnesium 200 mg daily  Topamax 200  mg BID   Cont B2, B6 supplements   2, Breakthrough headache - zanaflex 8 mg  PRN percocet Q=30  Multiple alternative treatment options were offered to the patient   3. Refrain from over counter pain medications. Discussed medication overuse headache.cont Magnesium 400 mg PO BID   4. Occipital neuralgia - If medical management is ineffective may consider occipital nerve blocks.   5. I again urged the patient to keep a headache calender.    f/up Dr. Rock for TBI    B12 injection - 5/25/17, 12/28/18, 5/23/19  Start Vit D supplements    f/up sleep clinic - CPAP pending      Reviewed labs today     Cont AJOVY (April 2019- May) No side effects        BOTOX was performed as an indicated therapy for intractable chronic migraine headaches given that the patient failed > 5 prophylactic medications  Botulinum Toxin Injection Procedure   Pre-operative diagnosis: Chronic migraine   Post-operative diagnosis: Same   Procedure: Chemical neurolysis   After risks and benefits were explained including bleeding, infection, worsening of pain, damage to the areas being injected, weakness of muscles, loss of muscle control, dysphagia if injecting the head or neck, facial droop if injecting the facial area, painful injection, allergic or other reaction to the medications being injected, and the failure of the procedure to help the problem, a signed consent was obtained.   The patient was placed in a comfortable area and the sites to be treated were identified.The area to be treated was prepped three times with alcohol and the alcohol allowed to dry. Next, a 30 gauge needle was used to inject the medication in the area to be treated.   Area(s) injected:   Total Botox used: 165 Units   Botox wastage: 35 Units   Injection sites:    muscle bilaterally ( a total of 10 units divided into 2 sites)   Procerus muscle (5 units)   Frontalis muscle bilaterally (a total of 20 units divided into 4 sites)   Temporalis muscle bilaterally (a  total of 40 units divided into 8 sites)   Occipitalis muscle bilaterally (a total of 30 units divided into 6 sites)   Cervical paraspinal muscles (a total of 20 units divided into 4 sites)   Trapezius muscle bilaterally (a total of 30 units divided into 6 sites)   Masseter 5 units catalina      Complications: none       RTC for the next Botox injection: 10 weeks

## 2019-05-23 NOTE — PROGRESS NOTES
Patient seen in clinic by Dr Cortez.Vitamin B12 1,000 mcg given in left deltoid per order of Dr Cortez.2 Patient identifiers noted and allergies reviewed.Patient on Xarelto and was monitored for bleeding after injection.Site clear,band aid applied to site.Patient remained in clinic with Nursing present.No untoward reaction noted.

## 2019-05-23 NOTE — PROGRESS NOTES
Subjective:   Patient ID:  Amna Chawla is a 53 y.o. female who presents for follow-up of History of CVA (cerebrovascular accident) (6 months fu)      HPI: She has been feeling well. She was recently diagnosed with pre-diabetes and is planning on starting a diet and exercise program next month. She reports an occasional flutter in her chest but otherwise feels well. Her home blood pressure readings were reviewed and run 106-124/70-81. Her last echo was 9/18 and showed her LVEF has normalized to 55-60%.     ECG (3/20/19): bi-V paced rhythm    Past Medical History:   Diagnosis Date    Anticoagulant long-term use     Anxiety     Asthma     Atrial fibrillation     Brain anoxic injury     Cervicalgia 8/28/2014    CHI (closed head injury) 2/19/2013    Convulsion 5/30/2015    Decreased ROM of left shoulder 4/12/2017    Defibrillator activation 2013    Depression     Heart block     History of sudden cardiac arrest 2/2013    PEA arrest with subsequent long-QT    Hx of psychiatric care     Hypertension     Left atrial enlargement 4/11/2018    Pacemaker 2013    Paresthesia 11/1/2013    Prolonged Q-T interval on ECG 2/8/2013    Psychiatric problem     Seizures     Sleep difficulties     Stroke     weakness lt side    Therapy     Thyroid disease     Upper airway resistance syndrome 2/21/2017       Past Surgical History:   Procedure Laterality Date    breast reduction      CARDIAC DEFIBRILLATOR PLACEMENT      CARDIAC DEFIBRILLATOR PLACEMENT      CARPAL TUNNEL RELEASE Right     colon  N/A 6/24/2014    Performed by Chemo [facility]ust[facility]ante MD at [facility] (2ND FLR)    COLONOSCOPY N/A 5/7/2019    Performed by Juan Coffey MD at [facility] (2ND FLR)    egd N/A 6/24/2014    Performed by Chemo [facility] at [facility] (2ND FLR)    HERNIA REPAIR      HIATAL HERNIA REPAIR      INSERT / REPLACE / REMOVE PACEMAKER  10/2017    CRT-D upgrade    INSERTION, CARDIAC PACEMAKER, DUAL CHAMBER N/A 2/15/2013    Performed by  Humberto Martins MD at [facility] CATH [facility]    INSERTION-ICD-BIVENTRICULAR N/A 10/13/2017    Performed by Avelino Mejia MD at [facility] CATH [facility]    RELEASE-CARPAL TUNNEL Left 2/3/2017    Per[facility] by Matt [facility]ugh Jr[facility] at [facility]    RELEASE-CARPAL TUNNEL Right 12/9/2016    Performed by Matt [facility]ugh Jr[facility] MD at [facility]    TOTAL REDUCTION MAMMOPLASTY      TUBAL LIGATION      VENOGRAM N/A 10/13/2017    Performed by [facility]ino Mejia MD at [facility] CATH LAB       Social History     Socioeconomic History    Marital status: Single     Spouse name: Not on file    Number of children: Not on file    Years of education: Not on file    Highest education level: Not on file   Occupational History     Employer: [employer]   Social Needs    Financial resource strain: Not on file    Food insecurity:     Worry: Not on file     Inability: Not on file    Transportation needs:     Medical: Not on file     Non-medical: Not on file   Tobacco Use    Smoking status: Never Smoker    Smokeless tobacco: Never Used   Substance and Sexual Activity    Alcohol use: No    Drug use: No    Sexual activity: Not Currently   Lifestyle    Physical activity:     Days per week: Not on file     Minutes per session: Not on file    Stress: Not on file   Relationships    Social connections:     Talks on phone: Not on file     Gets together: Not on file     Attends [employer] service: Not on file     Active member of club or organization: Not on file     Attends meetings of clubs or organizations: Not on file     Relationship status: Not on file   Other Topics Concern    Patient feels they ought to cut down on drinking/drug use Not Asked    Patient annoyed by others criticizing their drinking/drug use Not Asked    Patient has felt bad or guilty about drinking/drug use Not Asked    Patient has had a drink/used drugs as an eye opener in the AM Not Asked    Are you pregnant or think you may be? Not Asked    Breast-feeding Not  Asked   Social History Narrative    Now on disability       Family History   Problem Relation Age of Onset    Hypertension Mother     COPD Unknown     Heart failure Unknown     Glaucoma Maternal Grandmother     Glaucoma Paternal Grandmother        Patient's Medications   New Prescriptions    No medications on file   Previous Medications    ARIPIPRAZOLE (ABILIFY) 10 MG TAB    TAKE 1 TABLET(5 MG) BY MOUTH EVERY EVENING    BACLOFEN (LIORESAL) 20 MG TABLET    Take 1 tablet (20 mg total) by mouth 3 (three) times daily.    BLOOD SUGAR DIAGNOSTIC STRP    1 strip by Misc.(Non-Drug; Combo Route) route 2 (two) times daily.    BLOOD-GLUCOSE METER KIT    Please provide with insurance covered meter    CARVEDILOL (COREG) 25 MG TABLET    TAKE 1 TABLET(25 MG) BY MOUTH TWICE DAILY WITH MEALS    CLONAZEPAM (KLONOPIN) 1 MG TABLET    Take 1 tablet (1 mg total) by mouth daily as needed for Anxiety.    CLOPIDOGREL (PLAVIX) 75 MG TABLET    TAKE 1 TABLET BY MOUTH ONCE DAILY    DONEPEZIL (ARICEPT) 10 MG TABLET    Take 1 tablet (10 mg total) by mouth 2 (two) times daily.    DULOXETINE (CYMBALTA) 60 MG CAPSULE    Take 1 capsule (60 mg total) by mouth 2 (two) times daily.    FREMANEZUMAB-VFRM (AJOVY) 225 MG/1.5 ML SYRG    Inject 225 mg into the skin every 28 days.    GABAPENTIN (NEURONTIN) 300 MG CAPSULE    Take 3 capsules (900 mg total) by mouth 3 (three) times daily.    HYDROXYZINE (ATARAX) 50 MG TABLET    Take 1 tablet (50 mg total) by mouth nightly as needed.    HYDROXYZINE HCL (ATARAX) 10 MG TAB    Take 1 tablet (10 mg total) by mouth 3 (three) times daily as needed (anxiety).    LANCETS MISC    1 Device by Misc.(Non-Drug; Combo Route) route 2 (two) times daily.    LORAZEPAM (ATIVAN) 0.5 MG TABLET    Take 1 tablet (0.5 mg total) by mouth every 6 (six) hours as needed for Anxiety.    LOSARTAN (COZAAR) 50 MG TABLET    Take 1 tablet (50 mg total) by mouth once daily.    MAGNESIUM 200 MG TAB    Take 200 mg by mouth once daily.     ONDANSETRON (ZOFRAN-ODT) 4 MG TBDL    Take 1 tablet (4 mg total) by mouth every 12 (twelve) hours as needed (nausea).    PANTOPRAZOLE (PROTONIX) 40 MG TABLET    Take 1 tablet (40 mg total) by mouth once daily.    RIVAROXABAN (XARELTO) 20 MG TAB    Take 1 tablet (20 mg total) by mouth daily with dinner or evening meal.    ROSUVASTATIN (CRESTOR) 40 MG TAB    Take 1 tablet (40 mg total) by mouth every evening.    SILVER SULFADIAZINE 1% (SILVADENE) 1 % CREAM    Apply topically 2 (two) times daily.    TIAGABINE (GABITRIL) 4 MG TABLET    Take 1 tablet (4 mg total) by mouth every evening.    ZALEPLON (SONATA) 10 MG CAPSULE    Take 1 capsule (10 mg total) by mouth every evening.   Modified Medications    No medications on file   Discontinued Medications    No medications on file       Review of Systems   Constitution: Negative for malaise/fatigue and weight gain.   HENT: Negative for hearing loss.    Eyes: Negative for visual disturbance.   Cardiovascular: Negative for chest pain, claudication, dyspnea on exertion, leg swelling, near-syncope, orthopnea, palpitations, paroxysmal nocturnal dyspnea and syncope.   Respiratory: Negative for cough, shortness of breath, sleep disturbances due to breathing, snoring and wheezing.    Endocrine: Negative for cold intolerance, heat intolerance, polydipsia, polyphagia and polyuria.   Hematologic/Lymphatic: Negative for bleeding problem. Does not bruise/bleed easily.   Skin: Negative for rash and suspicious lesions.   Musculoskeletal: Negative for arthritis, falls, joint pain, muscle weakness and myalgias.   Gastrointestinal: Negative for abdominal pain, change in bowel habit, constipation, diarrhea, heartburn, hematochezia, melena and nausea.   Genitourinary: Negative for hematuria and nocturia.   Neurological: Negative for excessive daytime sleepiness, dizziness, headaches, light-headedness, loss of balance and weakness.   Psychiatric/Behavioral: Negative for depression. The patient is  "not nervous/anxious.    Allergic/Immunologic: Negative for environmental allergies.       BP (!) 161/78 (BP Location: Left arm, Patient Position: Sitting, BP Method: X-Large (Automatic))   Pulse 81   Ht 5' 4" (1.626 m)   Wt 131.2 kg (289 lb 3.9 oz)   BMI 49.65 kg/m²     Objective:   Physical Exam   Constitutional: She is oriented to person, place, and time. She appears well-developed and well-nourished.        HENT:   Head: Normocephalic and atraumatic.   Mouth/Throat: Oropharynx is clear and moist.   Eyes: Pupils are equal, round, and reactive to light. Conjunctivae and EOM are normal. No scleral icterus.   Neck: Normal range of motion. Neck supple. No hepatojugular reflux and no JVD present. No tracheal deviation present. No thyromegaly present.   Cardiovascular: Normal rate, regular rhythm, normal heart sounds and intact distal pulses. PMI is not displaced.   Pulses:       Carotid pulses are 2+ on the right side, and 2+ on the left side.       Radial pulses are 2+ on the right side, and 2+ on the left side.        Dorsalis pedis pulses are 2+ on the right side, and 2+ on the left side.        Posterior tibial pulses are 2+ on the right side, and 2+ on the left side.   Pulmonary/Chest: Effort normal and breath sounds normal.   Abdominal: Soft. Bowel sounds are normal. She exhibits no distension and no mass. There is no hepatosplenomegaly. There is no tenderness.   Musculoskeletal: She exhibits no edema or tenderness.   Lymphadenopathy:     She has no cervical adenopathy.   Neurological: She is alert and oriented to person, place, and time.   Skin: Skin is warm and dry. No rash noted. No cyanosis or erythema. Nails show no clubbing.   Psychiatric: She has a normal mood and affect. Her speech is normal and behavior is normal.       Lab Results   Component Value Date     05/21/2019    K 4.1 05/21/2019     05/21/2019    CO2 24 05/21/2019    BUN 9 05/21/2019    CREATININE 0.8 05/21/2019    GLU 89 " 05/21/2019    HGBA1C 6.1 (H) 05/21/2019    MG 1.9 09/29/2018    AST 16 05/21/2019    ALT 24 05/21/2019    ALBUMIN 3.2 (L) 05/21/2019    PROT 9.3 (H) 05/21/2019    BILITOT 0.3 05/21/2019    WBC 3.12 (L) 05/21/2019    HGB 11.1 (L) 05/21/2019    HCT 34.2 (L) 05/21/2019    MCV 92 05/21/2019     05/21/2019    INR 1.1 09/29/2018    TSH 0.553 01/18/2019    CHOL 119 (L) 05/17/2019    HDL 40 05/17/2019    LDLCALC 65.4 05/17/2019    TRIG 68 05/17/2019    BNP 11 02/02/2014       Assessment:     1. History of CVA (cerebrovascular accident) : Continue statin and plavix.   2. Complete AV block    3. Essential hypertension : Home readings at goal. She just took her medications prior to her appointment today. No changes made.   4. History of sudden cardiac arrest : CRT-D   5. Nonischemic cardiomyopathy from RV pacing, resolved with upgrade cardiac resynchronization therapy :  Continue losartan and carvedilol.   6. Paroxysmal atrial fibrillation :[id number]- Vasc is 5. Continue xarelto.   7. Pacemaker    8. Long term (current) use of anticoagulants    9. SHIRA (obstructive sleep apnea) : In the process of getting a cpap machine.       Plan:     Amna was seen today for history of cva (cerebrovascular accident).    Diagnoses and all orders for this visit:    History of CVA (cerebrovascular accident)    Complete AV block    Essential hypertension    History of sudden cardiac arrest    Nonischemic cardiomyopathy from RV pacing, resolved with upgrade cardiac resynchronization therapy    Paroxysmal atrial fibrillation  -     Comprehensive metabolic panel; Future  -     CBC auto differential; Future    Pacemaker    Long term (current) use of anticoagulants    SHIRA (obstructive sleep apnea)        Thank you for allowing me to participate in this patient's care. Please do not hesitate to contact me with any questions or concerns.

## 2019-05-27 ENCOUNTER — TELEPHONE (OUTPATIENT)
Dept: PHARMACY | Facility: CLINIC | Age: 54
End: 2019-05-27

## 2019-05-28 ENCOUNTER — OFFICE VISIT (OUTPATIENT)
Dept: SLEEP MEDICINE | Facility: CLINIC | Age: 54
End: 2019-05-28
Payer: MEDICARE

## 2019-05-28 VITALS
BODY MASS INDEX: 49.26 KG/M2 | HEART RATE: 66 BPM | WEIGHT: 288.56 LBS | DIASTOLIC BLOOD PRESSURE: 77 MMHG | SYSTOLIC BLOOD PRESSURE: 132 MMHG | HEIGHT: 64 IN

## 2019-05-28 DIAGNOSIS — G47.33 OBSTRUCTIVE SLEEP APNEA: Primary | ICD-10-CM

## 2019-05-28 PROCEDURE — 99999 PR PBB SHADOW E&M-EST. PATIENT-LVL V: CPT | Mod: PBBFAC,,, | Performed by: NURSE PRACTITIONER

## 2019-05-28 PROCEDURE — 99499 UNLISTED E&M SERVICE: CPT | Mod: S$PBB,,, | Performed by: NURSE PRACTITIONER

## 2019-05-28 PROCEDURE — 99999 PR PBB SHADOW E&M-EST. PATIENT-LVL V: ICD-10-PCS | Mod: PBBFAC,,, | Performed by: NURSE PRACTITIONER

## 2019-05-28 PROCEDURE — 99215 OFFICE O/P EST HI 40 MIN: CPT | Mod: PBBFAC | Performed by: NURSE PRACTITIONER

## 2019-05-28 PROCEDURE — 99499 NO LOS: ICD-10-PCS | Mod: S$PBB,,, | Performed by: NURSE PRACTITIONER

## 2019-05-28 NOTE — LETTER
May 28, 2019      Tiffany Mina MD  [address]  [facility] [address]           [facility] [place] [facility] [address]  [address]  [facility] [address]  Phone: [phone]          Patient: Amna Chawla   MR Number: [id number]   YOB: 1965   Date of Visit: 5/28/2019       Dear Dr. Sterling:    Thank you for referring Amna Chawla to me for evaluation. Attached you will find relevant portions of my assessment and plan of care.    If you have questions, please do not hesitate to call me. I look forward to following Amna Chawla along with you.    Sincerely,    Lorena Kaufman, NP    Enclosure  CC:  No Recipients    If you would like to receive this communication electronically, please contact [email or web address] or [phone] to request more information on [email or web address] access.    For providers and/or their staff who would like to refer a patient to [facility], please contact us through our one-stop-shop provider referral line, [facility], at [phone].    If you feel you have received this communication in error or would no longer like to receive these types of communications, please e-mail [email or web address]

## 2019-05-31 ENCOUNTER — EXTERNAL CHRONIC CARE MANAGEMENT (OUTPATIENT)
Dept: PRIMARY CARE CLINIC | Facility: CLINIC | Age: 54
End: 2019-05-31
Payer: MEDICARE

## 2019-05-31 PROCEDURE — 99490 CHRNC CARE MGMT STAFF 1ST 20: CPT | Mod: S$PBB,,, | Performed by: PSYCHIATRY & NEUROLOGY

## 2019-05-31 PROCEDURE — 99490 CHRNC CARE MGMT STAFF 1ST 20: CPT | Mod: PBBFAC | Performed by: PSYCHIATRY & NEUROLOGY

## 2019-05-31 PROCEDURE — 99490 PR CHRONIC CARE MGMT, 1ST 20 MIN: ICD-10-PCS | Mod: S$PBB,,, | Performed by: PSYCHIATRY & NEUROLOGY

## 2019-06-04 ENCOUNTER — HOME CARE VISIT (OUTPATIENT)
Dept: NEUROLOGY | Facility: HOSPITAL | Age: 54
End: 2019-06-04

## 2019-06-09 ENCOUNTER — PATIENT MESSAGE (OUTPATIENT)
Dept: INTERNAL MEDICINE | Facility: CLINIC | Age: 54
End: 2019-06-09

## 2019-06-10 ENCOUNTER — TELEPHONE (OUTPATIENT)
Dept: INTERNAL MEDICINE | Facility: CLINIC | Age: 54
End: 2019-06-10

## 2019-06-10 ENCOUNTER — OFFICE VISIT (OUTPATIENT)
Dept: INTERNAL MEDICINE | Facility: CLINIC | Age: 54
End: 2019-06-10
Payer: MEDICARE

## 2019-06-10 ENCOUNTER — PATIENT MESSAGE (OUTPATIENT)
Dept: INTERNAL MEDICINE | Facility: CLINIC | Age: 54
End: 2019-06-10

## 2019-06-10 ENCOUNTER — PATIENT MESSAGE (OUTPATIENT)
Dept: PSYCHIATRY | Facility: CLINIC | Age: 54
End: 2019-06-10

## 2019-06-10 VITALS
OXYGEN SATURATION: 98 % | HEIGHT: 64 IN | WEIGHT: 289.25 LBS | DIASTOLIC BLOOD PRESSURE: 82 MMHG | SYSTOLIC BLOOD PRESSURE: 138 MMHG | HEART RATE: 102 BPM | BODY MASS INDEX: 49.38 KG/M2

## 2019-06-10 DIAGNOSIS — L08.9 FINGER INFECTION: Primary | ICD-10-CM

## 2019-06-10 PROCEDURE — 99999 PR PBB SHADOW E&M-EST. PATIENT-LVL III: ICD-10-PCS | Mod: PBBFAC,,, | Performed by: INTERNAL MEDICINE

## 2019-06-10 PROCEDURE — 99999 PR PBB SHADOW E&M-EST. PATIENT-LVL III: CPT | Mod: PBBFAC,,, | Performed by: INTERNAL MEDICINE

## 2019-06-10 PROCEDURE — 99214 PR OFFICE/OUTPT VISIT, EST, LEVL IV, 30-39 MIN: ICD-10-PCS | Mod: S$PBB,,, | Performed by: INTERNAL MEDICINE

## 2019-06-10 PROCEDURE — 99213 OFFICE O/P EST LOW 20 MIN: CPT | Mod: PBBFAC | Performed by: INTERNAL MEDICINE

## 2019-06-10 PROCEDURE — 99214 OFFICE O/P EST MOD 30 MIN: CPT | Mod: S$PBB,,, | Performed by: INTERNAL MEDICINE

## 2019-06-10 RX ORDER — SULFAMETHOXAZOLE AND TRIMETHOPRIM 800; 160 MG/1; MG/1
1 TABLET ORAL 2 TIMES DAILY
Qty: 14 TABLET | Refills: 0 | Status: SHIPPED | OUTPATIENT
Start: 2019-06-10 | End: 2019-06-17

## 2019-06-10 NOTE — PROGRESS NOTES
INTERNAL MEDICINE ESTABLISHED PATIENT VISIT NOTE    Subjective:     Chief Complaint: Hand Pain (left middle finger pain for 1 week)       Patient ID: Amna Chawla is a 53 y.o. female with hx CVA and TIA c residual cognitive deficits (most recently c TIA 9/2018 per pt, has mild B weakness from several strokes in the past), carotid a disease, HTN, paroxysmal A fib c LAE, hx sudden cardiac arrest c VF and no ischemic heart disease and preserved EF (2013), intermittent 3rd deg AV block and symptomatic LVEF of 40% in setting of normal PET stress and chronic RV pacing s/p CRT-D, HLD, thyroid nodule s/p FNA 7/2018 c path c/w benign follicular nodule, depression with anxiety, chronic complex h/a c occipital neuralgia followed by Neuro and on mult meds and has also had tx c botox, GERD c hiatal hernia, B carpal tunnel s/p release B, SHIRA on APAP 6-20cm followed in sleep clinic, insomnia, prediabetes, last seen by me last month here today for focused same day appt for recent burn to her L middle finger.    Reports she burned herself on 5/22 c a hot glue gun.  Says a small piece of skin came off her finger.  Was rx'ed silvadene and says the skin healed but still has pain over her finger.  Also notes mild swelling.  No drainage.    Past Medical History:  Past Medical History:   Diagnosis Date    Anticoagulant long-term use     Anxiety     Asthma     Atrial fibrillation     Brain anoxic injury     Cervicalgia 8/28/2014    CHI (closed head injury) 2/19/2013    Convulsion 5/30/2015    Decreased ROM of left shoulder 4/12/2017    Defibrillator activation 2013    Depression     Heart block     History of sudden cardiac arrest 2/2013    PEA arrest with subsequent long-QT    Hx of psychiatric care     Hypertension     Left atrial enlargement 4/11/2018    Pacemaker 2013    Paresthesia 11/1/2013    Prolonged Q-T interval on ECG 2/8/2013    Psychiatric problem     Seizures     Sleep difficulties     Stroke      weakness lt side    Therapy     Thyroid disease     Upper airway resistance syndrome 2/21/2017       Home Medications:  Prior to Admission medications    Medication Sig Start Date End Date Taking? Authorizing Provider   ARIPiprazole (ABILIFY) 10 MG Tab TAKE 1 TABLET(5 MG) BY MOUTH EVERY EVENING 4/4/19  Yes Kade Huffman III, NP   baclofen (LIORESAL) 20 MG tablet Take 1 tablet (20 mg total) by mouth 3 (three) times daily.  Patient taking differently: Take 20 mg by mouth 3 (three) times daily as needed.  5/21/19 5/20/20 Yes Tiffany Mina MD   blood sugar diagnostic Strp 1 strip by Misc.(Non-Drug; Combo Route) route 2 (two) times daily. 5/20/19  Yes Tiffany Mina MD   blood-glucose meter kit Please provide with insurance covered meter 5/20/19  Yes Tiffany Mina MD   carvedilol (COREG) 25 MG tablet TAKE 1 TABLET(25 MG) BY MOUTH TWICE DAILY WITH MEALS 8/21/18  Yes Rin Martins MD   clonazePAM (KLONOPIN) 1 MG tablet Take 1 tablet (1 mg total) by mouth daily as needed for Anxiety. 4/4/19  Yes Kade Huffman III, NP   clopidogrel (PLAVIX) 75 mg tablet TAKE 1 TABLET BY MOUTH ONCE DAILY 3/26/19  Yes Perlita Ruth MD   donepezil (ARICEPT) 10 MG tablet Take 1 tablet (10 mg total) by mouth 2 (two) times daily. 7/18/17  Yes Toby Paredes MD   DULoxetine (CYMBALTA) 60 MG capsule Take 1 capsule (60 mg total) by mouth 2 (two) times daily.  Patient taking differently: Take 60 mg by mouth once daily.  4/4/19  Yes Kade Huffman III, NP   ergocalciferol (ERGOCALCIFEROL) 50,000 unit Cap Take 1 capsule (50,000 Units total) by mouth every 7 days. for 6 doses 5/23/19 6/28/19 Yes Davdi Cortez MD   fremanezumab-vfrm (AJOVY) 225 mg/1.5 mL injection Inject 225 mg into the skin every 28 days. 3/13/19  Yes David Cortez MD   gabapentin (NEURONTIN) 300 MG capsule Take 3 capsules (900 mg total) by mouth 3 (three) times daily. 11/30/16  Yes David Cortez MD   hydrOXYzine (ATARAX) 50 MG tablet Take 1 tablet (50 mg total) by mouth  nightly as needed. 4/4/19  Yes Kade Huffman III, NP   hydrOXYzine HCl (ATARAX) 10 MG Tab Take 1 tablet (10 mg total) by mouth 3 (three) times daily as needed (anxiety). 4/4/19  Yes Kade Huffman III, NP   lancets Misc 1 Device by Misc.(Non-Drug; Combo Route) route 2 (two) times daily. 5/20/19  Yes Tiffany Mina MD   losartan (COZAAR) 50 MG tablet Take 1 tablet (50 mg total) by mouth once daily. 3/20/19 3/19/20 Yes Avelino Mejia MD   magnesium 200 mg Tab Take 200 mg by mouth once daily.  Patient taking differently: Take 200 mg by mouth every other day.  3/7/18  Yes David Cortez MD   ondansetron (ZOFRAN-ODT) 4 MG TbDL Take 1 tablet (4 mg total) by mouth every 12 (twelve) hours as needed (nausea). 5/21/19  Yes Tiffany Mina MD   pantoprazole (PROTONIX) 40 MG tablet Take 1 tablet (40 mg total) by mouth once daily. 5/23/19  Yes Silvia Ahmadi MD   rivaroxaban (XARELTO) 20 mg Tab Take 1 tablet (20 mg total) by mouth daily with dinner or evening meal. 1/10/19  Yes Avelino Mejia MD   rosuvastatin (CRESTOR) 40 MG Tab Take 1 tablet (40 mg total) by mouth every evening. 8/21/18 8/21/19 Yes Rin Martins MD   silver sulfADIAZINE 1% (SILVADENE) 1 % cream Apply topically 2 (two) times daily. 5/22/19  Yes Tiffany Mina MD   tiaGABine (GABITRIL) 4 MG tablet Take 1 tablet (4 mg total) by mouth every evening. 3/7/18  Yes David Cortez MD   LORazepam (ATIVAN) 0.5 MG tablet Take 1 tablet (0.5 mg total) by mouth every 6 (six) hours as needed for Anxiety. 4/2/19 5/28/19  Jewell Hankins MD   zolpidem (AMBIEN CR) 12.5 MG CR tablet TAKE 1 TABLET BY MOUTH NIGHTLY AS NEEDED FOR INSOMNIA 4/4/19 4/23/19  Kade Huffman III, NP       Allergies:  Review of patient's allergies indicates:   Allergen Reactions    Aspirin Hives    Imitrex [sumatriptan] Palpitations    Penicillins Hives and Swelling    Shellfish containing products Anaphylaxis     seafood    Percocet [oxycodone-acetaminophen] Itching     States can take     "Reglan [metoclopramide hcl] Other (See Comments)     Parkinsonism        Social History:  Social History     Tobacco Use    Smoking status: Never Smoker    Smokeless tobacco: Never Used   Substance Use Topics    Alcohol use: No    Drug use: No        Review of Systems   Constitutional: Negative for chills, fatigue and fever.   Respiratory: Negative for cough, chest tightness and shortness of breath.    Cardiovascular: Negative for chest pain.   Gastrointestinal: Negative for abdominal pain and blood in stool.   Genitourinary: Negative for dysuria and frequency.         Health Maintenance:   Immunizations:   Influenza 9/2018  TDap 10/2/2018  Pneumovax 9/2018 here per pt  Shingrix rec once back in stock     Cancer Screening:  PAP: 11/2017, has f/u c Dr. Vasquez this Fall  Mammogram:  10/3/2018 benign  Colonoscopy:  5/2019, polyps x2 removed, tubular adenoma dn tubulovillous adenoma on path c rec repeat in 3 yrs per report.    Objective:   BP (!) 144/82 (BP Location: Left arm, Patient Position: Sitting, BP Method: Large (Manual))   Pulse 102   Ht 5' 4" (1.626 m)   Wt 131.2 kg (289 lb 3.9 oz)   SpO2 98%   BMI 49.65 kg/m²        General: AAO x3, no apparent distress  CV: RRR, no m/r/g  Pulm: Lungs CTAB, no crackles, no wheezes  Abd: s/NT/ND +BS  Extremities: no c/c/e on LE, L middle finger c healed scar over knuckle of L middle finger but slight swelling just proximal to the knuckle and marginal erythema.  No underlying fluctuance.  No drainage on manipulation.      Labs:         Assessment/Plan     Aman was seen today for hand pain.    Diagnoses and all orders for this visit:    Finger infection  Appears to have some early cellulitis.  Advised to avoid picking at the healed area.  Will rx bactrim for 5-7 days.  Advised to call me if sx fail to improve over the next week c tx.  -     sulfamethoxazole-trimethoprim 800-160mg (BACTRIM DS) 800-160 mg Tab; Take 1 tablet by mouth 2 (two) times daily. for 7 days    HM " as above    RTC in Sept for routine f/u    Tiffany Mina MD  Department of Internal Medicine - [facility]  06/10/2019

## 2019-06-20 ENCOUNTER — OFFICE VISIT (OUTPATIENT)
Dept: PSYCHIATRY | Facility: CLINIC | Age: 54
End: 2019-06-20
Payer: MEDICARE

## 2019-06-20 DIAGNOSIS — G47.00 INSOMNIA, UNSPECIFIED TYPE: ICD-10-CM

## 2019-06-20 DIAGNOSIS — F33.1 DEPRESSION, MAJOR, RECURRENT, MODERATE: Primary | ICD-10-CM

## 2019-06-20 PROCEDURE — 90834 PSYTX W PT 45 MINUTES: CPT | Mod: ,,, | Performed by: SOCIAL WORKER

## 2019-06-20 PROCEDURE — 90834 PR PSYCHOTHERAPY W/PATIENT, 45 MIN: ICD-10-PCS | Mod: ,,, | Performed by: SOCIAL WORKER

## 2019-06-21 ENCOUNTER — TELEPHONE (OUTPATIENT)
Dept: ELECTROPHYSIOLOGY | Facility: CLINIC | Age: 54
End: 2019-06-21

## 2019-06-21 NOTE — TELEPHONE ENCOUNTER
----- Message from Daphney Suggs MA sent at 6/21/2019  4:55 PM CDT -----  Contact: [phone]/[place]ars      ----- Message -----  From: Candelaria Richter  Sent: 6/21/2019   4:50 PM  To: Roberto ABREU Staff    Type:  Pharmacy Calling to Clarify an RX    Name of Caller:  Pharmacy Name:[id number] - [place], [place] - [address] AT [facility]  Prescription Name: Already taking clopidogrel (PLAVIX) 75 mg tablet and new rx for XARELTO 20 is also a blood thinner  What do they need to clarify?:Should they fill the Xarelto 20mg  Best Call Back Number:  Additional Information:

## 2019-06-21 NOTE — TELEPHONE ENCOUNTER
Spoke to Lenny Pharm D.  Let him know per Dr Martins's note on 5/23 pt should be on both Plavix and Xarelto.  He verbalizes understanding and appreciates call.

## 2019-06-24 VITALS
DIASTOLIC BLOOD PRESSURE: 92 MMHG | SYSTOLIC BLOOD PRESSURE: 138 MMHG | OXYGEN SATURATION: 96 % | RESPIRATION RATE: 20 BRPM | HEART RATE: 68 BPM

## 2019-06-24 NOTE — PROGRESS NOTES
AAOx3.  Lives w/ family but visit occurred alone.  NIHSS-0; does not smoke; refrains from adding salt, salty and fast food; compliant w/ all meds; no consistent form of exercise noted.  Education provided on goal of stroke mobile, s/s of stroke, diet, exercise, medications. Understanding verbalized.  Encourage to utilize stroke team for any concerns.

## 2019-06-29 ENCOUNTER — NURSE TRIAGE (OUTPATIENT)
Dept: ADMINISTRATIVE | Facility: CLINIC | Age: 54
End: 2019-06-29

## 2019-06-29 NOTE — TELEPHONE ENCOUNTER
Reason for Disposition   [1] Follow-up call from patient regarding patient's clinical status AND [2] information urgent    Protocols used: ST PCP CALL - NO TRIAGE-A-    Patient called to report that her AICD fired about 1125 this morning and from 1013-3483 she had chest pain and shortness of breath, but it subsided. Called on call provider to assist.     Called on call provider again and  states she was told the call needs to go to staff. Dr. Coker answered. Explained to Dr. Coker what patient's concerns are at this time, and he states that if she only gets shocked once and feels fine, she can come into the office on Monday. If 2 shocks in 24 hour period, patient needs to go to the ED regardless of if she feels fine.     Called Mrs Chawla back to inform her that a high priority message  was sent to arrhythmia staff and Dr. Coker wants her to come in on Monday. Ms. Chawla verbalized understanding.

## 2019-06-30 ENCOUNTER — EXTERNAL CHRONIC CARE MANAGEMENT (OUTPATIENT)
Dept: PRIMARY CARE CLINIC | Facility: CLINIC | Age: 54
End: 2019-06-30
Payer: MEDICARE

## 2019-06-30 PROCEDURE — 99490 CHRNC CARE MGMT STAFF 1ST 20: CPT | Mod: PBBFAC | Performed by: PSYCHIATRY & NEUROLOGY

## 2019-06-30 PROCEDURE — 99490 CHRNC CARE MGMT STAFF 1ST 20: CPT | Mod: S$PBB,,, | Performed by: PSYCHIATRY & NEUROLOGY

## 2019-06-30 PROCEDURE — 99490 PR CHRONIC CARE MGMT, 1ST 20 MIN: ICD-10-PCS | Mod: S$PBB,,, | Performed by: PSYCHIATRY & NEUROLOGY

## 2019-07-01 ENCOUNTER — TELEPHONE (OUTPATIENT)
Dept: CARDIOLOGY | Facility: HOSPITAL | Age: 54
End: 2019-07-01

## 2019-07-01 ENCOUNTER — HOSPITAL ENCOUNTER (OUTPATIENT)
Dept: CARDIOLOGY | Facility: CLINIC | Age: 54
Discharge: HOME | End: 2019-07-01
Payer: MEDICARE

## 2019-07-01 ENCOUNTER — OFFICE VISIT (OUTPATIENT)
Dept: ELECTROPHYSIOLOGY | Facility: CLINIC | Age: 54
End: 2019-07-01
Payer: MEDICARE

## 2019-07-01 ENCOUNTER — TELEPHONE (OUTPATIENT)
Dept: ELECTROPHYSIOLOGY | Facility: CLINIC | Age: 54
End: 2019-07-01

## 2019-07-01 VITALS
BODY MASS INDEX: 49.11 KG/M2 | DIASTOLIC BLOOD PRESSURE: 86 MMHG | HEART RATE: 81 BPM | HEIGHT: 64 IN | WEIGHT: 287.69 LBS | SYSTOLIC BLOOD PRESSURE: 142 MMHG

## 2019-07-01 DIAGNOSIS — Z86.73 HISTORY OF CVA (CEREBROVASCULAR ACCIDENT): ICD-10-CM

## 2019-07-01 DIAGNOSIS — I48.0 PAROXYSMAL ATRIAL FIBRILLATION: ICD-10-CM

## 2019-07-01 DIAGNOSIS — Z79.01 LONG TERM (CURRENT) USE OF ANTICOAGULANTS: ICD-10-CM

## 2019-07-01 DIAGNOSIS — I44.2 COMPLETE AV BLOCK: ICD-10-CM

## 2019-07-01 DIAGNOSIS — Z86.74 HISTORY OF SUDDEN CARDIAC ARREST: ICD-10-CM

## 2019-07-01 DIAGNOSIS — I10 ESSENTIAL HYPERTENSION: Primary | Chronic | ICD-10-CM

## 2019-07-01 DIAGNOSIS — I42.8 NONISCHEMIC CARDIOMYOPATHY: ICD-10-CM

## 2019-07-01 DIAGNOSIS — G47.33 OSA (OBSTRUCTIVE SLEEP APNEA): ICD-10-CM

## 2019-07-01 PROCEDURE — 99999 PR PBB SHADOW E&M-EST. PATIENT-LVL III: CPT | Mod: PBBFAC,,, | Performed by: NURSE PRACTITIONER

## 2019-07-01 PROCEDURE — 93005 ELECTROCARDIOGRAM TRACING: CPT | Mod: PBBFAC | Performed by: INTERNAL MEDICINE

## 2019-07-01 PROCEDURE — 93010 ELECTROCARDIOGRAM REPORT: CPT | Mod: S$PBB,,, | Performed by: INTERNAL MEDICINE

## 2019-07-01 PROCEDURE — 99214 OFFICE O/P EST MOD 30 MIN: CPT | Mod: S$PBB,,, | Performed by: NURSE PRACTITIONER

## 2019-07-01 PROCEDURE — 99214 PR OFFICE/OUTPT VISIT, EST, LEVL IV, 30-39 MIN: ICD-10-PCS | Mod: S$PBB,,, | Performed by: NURSE PRACTITIONER

## 2019-07-01 PROCEDURE — 99213 OFFICE O/P EST LOW 20 MIN: CPT | Mod: PBBFAC,25 | Performed by: NURSE PRACTITIONER

## 2019-07-01 PROCEDURE — 93010 RHYTHM STRIP: ICD-10-PCS | Mod: S$PBB,,, | Performed by: INTERNAL MEDICINE

## 2019-07-01 PROCEDURE — 99999 PR PBB SHADOW E&M-EST. PATIENT-LVL III: ICD-10-PCS | Mod: PBBFAC,,, | Performed by: NURSE PRACTITIONER

## 2019-07-01 RX ORDER — BLOOD-GLUCOSE METER
EACH MISCELLANEOUS
Refills: 0 | COMMUNITY
Start: 2019-05-30

## 2019-07-01 NOTE — PROGRESS NOTES
Ms. Chawla is a patient of Dr. Mejia and was last seen in clinic 3/2019.      Subjective:   Patient ID:  Amna Chawla is a 53 y.o. female who presents for follow-up of Chest Pain  .     HPI:    Ms. Chawla is a 53 y.o. female with SCD, SVB, pAF, and ICD here for follow up.    Background:    Prior Hx:  I had the pleasure of seeing Amna Chawla in follow-up today for her history of cardiac arrest, heart block, and ICD implantation. As you are aware, she is a 53-year old female, former patient of Dr. Humberto Martins and patient of mine I cared for when she initially presented in cardiac arrest as well as followed in my general cardiology fellow clinic, who was admitted to [facility] [place] in 2/2013 after having a cardiac arrest.  She was working as a school  and collapsed at work.  On EMS arrival it was described that she was pulseless and given epinephrine (? Initially PEA) however after epinephrine noted to be in VF and was resuscitated with defibrillation/ACLS.  She underwent hypothermia protocol. Her post-arrest course was notable or intermittent 3rd-degree AV block. On 2/15/2013, a dual chamber ICD was implanted. Her EF prior to discharge was 60%. She had a negative coronary CTA during that admission.  In subsequent follow-up she underwent a pharmacologic stress ECHO in 2014 which showed a preserved LVEF and no ischemia.     Subsequent ICD interrogations show no VT, 100% RV pacing.  She was also diagnosed with symptomatic paroxysmal AF and was started on anticoagulation. She preferred coumadin.  She noted new onset dyspnea on exertion 9/2017. We performed an echocardiogram and her EF was 40-45% in setting of chronic RV pacing. Recent PET stress showed no ischemia/infarct. We proceeded with upgrade to CRT-D which was performed on 9/27/2017.     At Ms. Chawla's 3 month post CRT-D upgrade visit she noted more energy and improvement in the shortness of breath. She noted very rare diaphragm stimulation.    Ms.  Denton presents for 6 month post-CRT upgrade follow-up in 4/2018. We planned on getting an ECHO at the 6 month chu however it was done early at the 4 month chu. Her EF improved to 45-50% on that study (see below). Her main issues are complex headaches for which she is seeing neurology.     Ms. Chawla presented for 6 month follow-up 10/2018. She was recently hospitalized for left sided weakness in setting of severe hypertension. CT head was negative for any acute ischemia/bleed. She briefly held xarelto in August 2018 for severe epistaxis and then resumed. Device interrogation noted no recent AF. Repeat ECHO 9/2018 noted EF normalized at 55-60%.     She called the office 3/4/2019 complaining of feeling like heart was out of rhythm. Remote monitoring noted no arrhythmias. Recent remote report notes 11% RA and 99% BiV pacing. No VT noted. Thresholds stable. No AF. ICD sensing in clinic today is 5.9-8mV. Needs colonoscopy.     On 6/29/2019 she called the clinic saying she was about to do crafting on Saturday when she was shocked by ICD.  She reports she was feeling fine leading up to shock and felt fine afterward.  The on-call nurse spoke to on-call provider, Dr. Hallman, who recommended appt today for evaluation.     Update (7/1/2019):    Today she described her experience last Saturday. Says she was leaning over the laundry machine and felt a spasm over her chest that ached for about 5 minutes and then was sore for several minutes after that. She believed this to be an ICD discharge. Denies any subsequent chest pain, exertional or otherwise. Ms. Chawla denies palpitations, SOB, MERCHANT, dizziness, or syncope. She is on xarelto for pAF.    Today's sevice Interrogation (7/1/2019) reveals stable lead and device function. No arrhythmias or treated episodes were noted. She paces 11% in the RA and 99% in the BiV. Battery 2.99V.     I have personally reviewed the patient's EKG today, which shows ASVP at 81bpm. OR interval is  190. QRS is 134. QTc is 504.    Recent Cardiac Tests:    2D Echo (9/28/2019):  CONCLUSIONS     1 - Normal left ventricular systolic function (EF 55-60%).     2 - Biatrial enlargement.     3 - No wall motion abnormalities.     4 - Indeterminate LV diastolic function.     5 - Normal right ventricular systolic function .     6 - The estimated PA systolic pressure is 25 mmHg.     7 - Mild tricuspid regurgitation.     Current Outpatient Medications   Medication Sig    ARIPiprazole (ABILIFY) 10 MG Tab TAKE 1 TABLET(5 MG) BY MOUTH EVERY EVENING    baclofen (LIORESAL) 20 MG tablet Take 1 tablet (20 mg total) by mouth 3 (three) times daily. (Patient taking differently: Take 20 mg by mouth 3 (three) times daily as needed. )    blood sugar diagnostic Strp 1 strip by Misc.(Non-Drug; Combo Route) route 2 (two) times daily.    blood-glucose meter kit Please provide with insurance covered meter    carvedilol (COREG) 25 MG tablet TAKE 1 TABLET(25 MG) BY MOUTH TWICE DAILY WITH MEALS    clonazePAM (KLONOPIN) 1 MG tablet Take 1 tablet (1 mg total) by mouth daily as needed for Anxiety.    clopidogrel (PLAVIX) 75 mg tablet TAKE 1 TABLET BY MOUTH ONCE DAILY    donepezil (ARICEPT) 10 MG tablet Take 1 tablet (10 mg total) by mouth 2 (two) times daily.    DULoxetine (CYMBALTA) 60 MG capsule Take 1 capsule (60 mg total) by mouth 2 (two) times daily. (Patient taking differently: Take 60 mg by mouth once daily. )    fremanezumab-vfrm (AJOVY) 225 mg/1.5 mL injection Inject 225 mg into the skin every 28 days.    gabapentin (NEURONTIN) 300 MG capsule Take 3 capsules (900 mg total) by mouth 3 (three) times daily.    hydrOXYzine (ATARAX) 50 MG tablet Take 1 tablet (50 mg total) by mouth nightly as needed.    hydrOXYzine HCl (ATARAX) 10 MG Tab Take 1 tablet (10 mg total) by mouth 3 (three) times daily as needed (anxiety).    lancets Misc 1 Device by Misc.(Non-Drug; Combo Route) route 2 (two) times daily.    LORazepam (ATIVAN) 0.5 MG  "tablet Take 1 tablet (0.5 mg total) by mouth every 6 (six) hours as needed for Anxiety.    losartan (COZAAR) 50 MG tablet Take 1 tablet (50 mg total) by mouth once daily.    magnesium 200 mg Tab Take 200 mg by mouth once daily. (Patient taking differently: Take 200 mg by mouth every other day. )    ondansetron (ZOFRAN-ODT) 4 MG TbDL Take 1 tablet (4 mg total) by mouth every 12 (twelve) hours as needed (nausea).    pantoprazole (PROTONIX) 40 MG tablet Take 1 tablet (40 mg total) by mouth once daily.    rivaroxaban (XARELTO) 20 mg Tab Take 1 tablet (20 mg total) by mouth daily with dinner or evening meal.    rosuvastatin (CRESTOR) 40 MG Tab Take 1 tablet (40 mg total) by mouth every evening.    silver sulfADIAZINE 1% (SILVADENE) 1 % cream Apply topically 2 (two) times daily.    tiaGABine (GABITRIL) 4 MG tablet Take 1 tablet (4 mg total) by mouth every evening.    TRUE METRIX GLUCOSE METER Misc TEST BG BID     Current Facility-Administered Medications   Medication    cyanocobalamin injection 1,000 mcg    onabotulinumtoxina injection 200 Units    onabotulinumtoxina injection 200 Units    onabotulinumtoxina injection 200 Units    onabotulinumtoxina injection 200 Units       Review of Systems   Constitution: Negative for malaise/fatigue.   Cardiovascular: Positive for chest pain (single episode at rest). Negative for dyspnea on exertion, irregular heartbeat, leg swelling and palpitations.   Respiratory: Negative for shortness of breath.    Hematologic/Lymphatic: Negative for bleeding problem.   Skin: Negative for rash.   Musculoskeletal: Negative for myalgias.   Gastrointestinal: Negative for hematemesis, hematochezia and nausea.   Genitourinary: Negative for hematuria.   Neurological: Negative for light-headedness.   Psychiatric/Behavioral: Negative for altered mental status.   Allergic/Immunologic: Negative for persistent infections.         Objective:          BP (!) 142/86   Pulse 81   Ht 5' 4" (1.626 " m)   Wt 130.5 kg (287 lb 11.2 oz)   BMI 49.38 kg/m²     Physical Exam   Constitutional: She is oriented to person, place, and time. She appears well-developed and well-nourished.   HENT:   Head: Normocephalic.   Nose: Nose normal.   Eyes: Pupils are equal, round, and reactive to light.   Cardiovascular: Normal rate, regular rhythm, S1 normal and S2 normal.   No murmur heard.  Pulses:       Radial pulses are 2+ on the right side, and 2+ on the left side.   Pulmonary/Chest: Breath sounds normal. No respiratory distress.   Device to LUCW.   Abdominal: Normal appearance.   Musculoskeletal: Normal range of motion. She exhibits no edema.   Neurological: She is alert and oriented to person, place, and time.   Skin: Skin is warm and dry. No erythema.   Psychiatric: She has a normal mood and affect. Her speech is normal and behavior is normal.   Nursing note and vitals reviewed.        Lab Results   Component Value Date     05/21/2019    K 4.1 05/21/2019    MG 1.9 09/29/2018    BUN 9 05/21/2019    CREATININE 0.8 05/21/2019    ALT 24 05/21/2019    AST 16 05/21/2019    HGB 11.1 (L) 05/21/2019    HCT 34.2 (L) 05/21/2019    TSH 0.553 01/18/2019    LDLCALC 65.4 05/17/2019       Recent Labs   Lab 04/11/18  1436 04/12/18  0510 09/28/18  0821 09/29/18  0333   INR 1.0 1.0 1.2 1.1       Assessment:     1. Essential hypertension    2. Complete AV block    3. History of sudden cardiac arrest    4. Paroxysmal atrial fibrillation    5. Nonischemic cardiomyopathy from RV pacing, resolved with upgrade cardiac resynchronization therapy    6. History of CVA (cerebrovascular accident)    7. Long term (current) use of anticoagulants    8. SHIRA (obstructive sleep apnea)      Plan:     In summary, Ms. Chawla is a 53 y.o. female with SCD, SVB, pAF, and ICD here for follow up.  She reports an episode of CP at rest that she thought was an ICD discharge. Fortunately, today's device report shows no arrhythmias or treated episodes. She denies  exertional chest pain. Her daughter says she had been experiencing a lot of gas that weekend - her symptoms are highly suggestive of GI etiology. I instructed her to let us know if she experiences any additional episodes. Device is stable: 99% biventricular pacing with narrow QRS and no CHF symptoms. She will follow up in clinic in September.    Continue current regimen.  Continue device checks.  RTC in September as previously scheduled.    *A copy of this note has been sent to Dr. Mejia*    Follow up as previously scheduled..    ------------------------------------------------------------------    SAE Roe, NP-C  Cardiac Electrophysiology

## 2019-07-01 NOTE — TELEPHONE ENCOUNTER
----- Message from Julia Moralez MA sent at 7/1/2019  8:04 AM CDT -----  Pacemaker Problem      Nini Vargas RN routed conversation to Roberto ABREU Staff; Sheela Isaacs Staff 2 days ago    RODRIGO Ramierz Alisa Y 2 days ago    RODRIGO Ramirez Dr. 2 days ago    RODRIGO RamirezAmna [phone]  2 days ago      page   Outgoing call     RODRIGO Ramirez Alisa Y 2 days ago    Nini Vargas RN 2 days ago                Reason for Disposition   [1] Follow-up call from patient regarding patient's clinical status AND [2] information urgent    Protocols used: ST PCP CALL - NO TRIAGE-A-     Patient called to report that her AICD fired about 1125 this morning and from [id number] she had chest pain and shortness of breath, but it subsided. Called on call provider to assist.      Called on call provider again and  states she was told the call needs to go to staff. Dr. Coker answered. Explained to Dr. Coker what patient's concerns are at this time, and he states that if she only gets shocked once and feels fine, she can come into the office on Monday. If 2 shocks in 24 hour period, patient needs to go to the ED regardless of if she feels fine.      Called Mrs Chawla back to inform her that a high priority message  was sent to arrhythmia staff and Dr. Coker wants her to come in on Monday. Ms. Chawla verbalized understanding.       Documentation     Amna Chawla [phone]  Bijan Bazan 2 days ago    Disposition       Call PCP Now     What would patient/caregiver have done without intervention? Would have attempted to contact the Provider  Patient/Caregiver understands and will follow disposition? Yes

## 2019-07-01 NOTE — TELEPHONE ENCOUNTER
"Patient contacted the Device Clinic on this morning and stated. " I think my ICD shocked me on Saturday."  (6/29/19) Patient stated she was bending over looking for some 4th of July decorations and she felt "something" and thought it was that her ICD shocked her. Patient has a Biotronik ICD and a remote reading was received on this am and there were no arrhythmias recorded, no therapies delivered (ATP or shocks) and all is WNL.  Patient was informed of the remote report findings and verbalized understanding. This information was also sent via in basket message to Dr. Mccollum's RN.   "

## 2019-07-01 NOTE — LETTER
July 1, 2019      Avelino Mejia MD  [address]  [facility] [address]           [facility]  [address]  [facility] [address]  Phone: [phone]  Fax: [phone]          Patient: Amna Chawla   MR Number: [id number]   YOB: 1965   Date of Visit: 7/1/2019       Dear Dr. Avelino Mejia:    Thank you for referring Amna Chawla to me for evaluation. Attached you will find relevant portions of my assessment and plan of care.    If you have questions, please do not hesitate to call me. I look forward to following Amna Chawla along with you.    Sincerely,    Silvia Wang, NP    Enclosure  CC:  No Recipients    If you would like to receive this communication electronically, please contact [email or web address] or [phone] to request more information on [employer] access.    For providers and/or their staff who would like to refer a patient to [facility], please contact us through our one-stop-shop provider referral line, [facility], at [phone].    If you feel you have received this communication in error or would no longer like to receive these types of communications, please e-mail [email or web address]

## 2019-07-01 NOTE — TELEPHONE ENCOUNTER
Eliezer Gonzalez sent to RODRIGO Kelly,     This patient called on this morning.  I did speak with her and let her know that her Biotronik home monitor did read her ICD on this morning and there were no recorded arrhythmias, no report of an ICD shock or any other therapies.  All is WNL.  Patient verbalized understanding.  I believe that she called the Device Clinic after sending a message.     Thanks,   Eliezer

## 2019-07-08 ENCOUNTER — HOME CARE VISIT (OUTPATIENT)
Dept: NEUROLOGY | Facility: HOSPITAL | Age: 54
End: 2019-07-08

## 2019-07-09 ENCOUNTER — TELEPHONE (OUTPATIENT)
Dept: PHARMACY | Facility: CLINIC | Age: 54
End: 2019-07-09

## 2019-07-15 ENCOUNTER — TELEPHONE (OUTPATIENT)
Dept: ORTHOPEDICS | Facility: CLINIC | Age: 54
End: 2019-07-15
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
## 2019-07-17 ENCOUNTER — TELEPHONE (OUTPATIENT)
Dept: NEUROLOGY | Facility: CLINIC | Age: 54
End: 2019-07-17

## 2019-07-17 ENCOUNTER — PATIENT MESSAGE (OUTPATIENT)
Dept: INTERNAL MEDICINE | Facility: CLINIC | Age: 54
End: 2019-07-17

## 2019-07-17 DIAGNOSIS — R05.9 COUGH: Primary | ICD-10-CM

## 2019-07-17 RX ORDER — BENZONATATE 100 MG/1
100 CAPSULE ORAL 3 TIMES DAILY PRN
Qty: 30 CAPSULE | Refills: 0 | Status: SHIPPED | OUTPATIENT
Start: 2019-07-17 | End: 2019-07-27

## 2019-07-17 RX ORDER — TIAGABINE HYDROCHLORIDE 4 MG/1
4 TABLET, FILM COATED ORAL NIGHTLY
Qty: 30 TABLET | Refills: 12 | Status: SHIPPED | OUTPATIENT
Start: 2019-07-17 | End: 2020-08-03 | Stop reason: SDUPTHER

## 2019-07-22 ENCOUNTER — PATIENT MESSAGE (OUTPATIENT)
Dept: INTERNAL MEDICINE | Facility: CLINIC | Age: 54
End: 2019-07-22

## 2019-07-23 ENCOUNTER — OFFICE VISIT (OUTPATIENT)
Dept: INTERNAL MEDICINE | Facility: CLINIC | Age: 54
End: 2019-07-23
Payer: MEDICARE

## 2019-07-23 VITALS
HEIGHT: 64 IN | TEMPERATURE: 98 F | WEIGHT: 290.13 LBS | OXYGEN SATURATION: 98 % | DIASTOLIC BLOOD PRESSURE: 90 MMHG | SYSTOLIC BLOOD PRESSURE: 130 MMHG | BODY MASS INDEX: 49.53 KG/M2 | HEART RATE: 89 BPM

## 2019-07-23 DIAGNOSIS — J18.9 ATYPICAL PNEUMONIA: Primary | ICD-10-CM

## 2019-07-23 DIAGNOSIS — I10 ESSENTIAL HYPERTENSION: ICD-10-CM

## 2019-07-23 DIAGNOSIS — L08.9 FINGER INFECTION: ICD-10-CM

## 2019-07-23 PROCEDURE — 99999 PR PBB SHADOW E&M-EST. PATIENT-LVL III: CPT | Mod: PBBFAC,,, | Performed by: INTERNAL MEDICINE

## 2019-07-23 PROCEDURE — 99999 PR PBB SHADOW E&M-EST. PATIENT-LVL III: ICD-10-PCS | Mod: PBBFAC,,, | Performed by: INTERNAL MEDICINE

## 2019-07-23 PROCEDURE — 99213 OFFICE O/P EST LOW 20 MIN: CPT | Mod: PBBFAC | Performed by: INTERNAL MEDICINE

## 2019-07-23 PROCEDURE — 99214 PR OFFICE/OUTPT VISIT, EST, LEVL IV, 30-39 MIN: ICD-10-PCS | Mod: S$PBB,,, | Performed by: INTERNAL MEDICINE

## 2019-07-23 PROCEDURE — 99214 OFFICE O/P EST MOD 30 MIN: CPT | Mod: S$PBB,,, | Performed by: INTERNAL MEDICINE

## 2019-07-23 RX ORDER — LOSARTAN POTASSIUM 100 MG/1
100 TABLET ORAL DAILY
Qty: 90 TABLET | Refills: 3 | Status: SHIPPED | OUTPATIENT
Start: 2019-07-23 | End: 2020-05-25 | Stop reason: SDUPTHER

## 2019-07-23 RX ORDER — DOXYCYCLINE 100 MG/1
100 CAPSULE ORAL 2 TIMES DAILY
Qty: 14 CAPSULE | Refills: 0 | Status: SHIPPED | OUTPATIENT
Start: 2019-07-23 | End: 2019-07-30
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
## 2019-07-24 DIAGNOSIS — G43.711 CHRONIC MIGRAINE WITHOUT AURA, WITH INTRACTABLE MIGRAINE, SO STATED, WITH STATUS MIGRAINOSUS: Primary | ICD-10-CM

## 2019-07-25 ENCOUNTER — OFFICE VISIT (OUTPATIENT)
Dept: ORTHOPEDICS | Facility: CLINIC | Age: 54
End: 2019-07-25
Payer: MEDICARE

## 2019-07-25 ENCOUNTER — TELEPHONE (OUTPATIENT)
Dept: CARDIOLOGY | Facility: CLINIC | Age: 54
End: 2019-07-25

## 2019-07-25 VITALS
HEART RATE: 71 BPM | DIASTOLIC BLOOD PRESSURE: 84 MMHG | SYSTOLIC BLOOD PRESSURE: 132 MMHG | HEIGHT: 64 IN | WEIGHT: 290 LBS | BODY MASS INDEX: 49.51 KG/M2

## 2019-07-25 VITALS
RESPIRATION RATE: 20 BRPM | SYSTOLIC BLOOD PRESSURE: 126 MMHG | DIASTOLIC BLOOD PRESSURE: 78 MMHG | HEART RATE: 74 BPM | OXYGEN SATURATION: 98 %

## 2019-07-25 DIAGNOSIS — M65.332 TRIGGER MIDDLE FINGER OF LEFT HAND: Primary | ICD-10-CM

## 2019-07-25 PROCEDURE — 99999 PR PBB SHADOW E&M-EST. PATIENT-LVL V: ICD-10-PCS | Mod: PBBFAC,,, | Performed by: PLASTIC SURGERY

## 2019-07-25 PROCEDURE — 99213 PR OFFICE/OUTPT VISIT, EST, LEVL III, 20-29 MIN: ICD-10-PCS | Mod: S$PBB,,, | Performed by: PLASTIC SURGERY

## 2019-07-25 PROCEDURE — 99999 PR PBB SHADOW E&M-EST. PATIENT-LVL V: CPT | Mod: PBBFAC,,, | Performed by: PLASTIC SURGERY

## 2019-07-25 PROCEDURE — 99213 OFFICE O/P EST LOW 20 MIN: CPT | Mod: S$PBB,,, | Performed by: PLASTIC SURGERY

## 2019-07-25 PROCEDURE — 99215 OFFICE O/P EST HI 40 MIN: CPT | Mod: PBBFAC | Performed by: PLASTIC SURGERY

## 2019-07-25 RX ORDER — SODIUM CHLORIDE 9 MG/ML
INJECTION, SOLUTION INTRAVENOUS CONTINUOUS
Status: CANCELLED | OUTPATIENT
Start: 2019-07-25

## 2019-07-25 NOTE — PROGRESS NOTES
Chief Complaint: Pain of the Left Hand      HPI:  Mrs. Chawla returns to clinic today complaining of left hand pain.  The patient states that approximately 2 weeks ago she sustained a burn to the dorsal aspect of her left middle finger PIP joint.  After which she has noted increased swelling and pain decreased range of motion of the digit. She was seen by primary care physician who placed her on antibiotics however the swelling remained.  She denies any active triggering she does report decreased range of motion of the middle digit as well as pain on the volar aspect.  No numbness or tingling no recent history of trauma other than the recent burn.  No other complaints today    Past Medical History:   Diagnosis Date    Anticoagulant long-term use     Anxiety     Asthma     Atrial fibrillation     Brain anoxic injury     Cervicalgia 8/28/2014    CHI (closed head injury) 2/19/2013    Convulsion 5/30/2015    Decreased ROM of left shoulder 4/12/2017    Defibrillator activation 2013    Depression     Heart block     History of sudden cardiac arrest 2/2013    PEA arrest with subsequent long-QT    Hx of psychiatric care     Hypertension     Left atrial enlargement 4/11/2018    Pacemaker 2013    Paresthesia 11/1/2013    Prolonged Q-T interval on ECG 2/8/2013    Psychiatric problem     Seizures     Sleep difficulties     Stroke     weakness lt side    Therapy     Thyroid disease     Upper airway resistance syndrome 2/21/2017       Past Surgical History:   Procedure Laterality Date    breast reduction      CARDIAC DEFIBRILLATOR PLACEMENT      CARDIAC DEFIBRILLATOR PLACEMENT      CARPAL TUNNEL RELEASE Right     colon  N/A 6/24/2014    Performed [facility] at [facility] (2ND FLR)    COLONOSCOPY N/A 5/7/2019    Performed by Juan Coffey [facility] at [facility] END[facility] (2ND FLR)    egd N/A 6/24/2014    Per[facility] by [facility] (2ND FLR)    HERNIA REPAIR      HIATAL HERNIA REPAIR       INSERT / REPLACE / REMOVE PACEMAKER  10/2017    CRT-D upgrade    INSERTION, CARDIAC PACEMAKER, DUAL CHAMBER N/A 2/15/2013    [facility]    INSERTION-ICD-BIVENTRICULAR N/A 10/13/2017    [facility]ia [facility] C[facility]    RELEASE-CARPAL TUNNEL Left 2/3/2017    [facility]ugh Jr[facility] OR    RELEASE-CARPAL TUNNEL Right 12/9/2016    [facility] OR    TOTAL REDUCTION MAMMOPLASTY      TUBAL LIGATION      VENOGRAM N/A 10/13/2017    [facility] CATH [facility]        Family History   Problem Relation Age of Onset    Hypertension Mother     COPD Unknown     Heart failure Unknown     Glaucoma Maternal Grandmother     Glaucoma Paternal Grandmother        Social History     Socioeconomic History    Marital status: Single     Spouse name: Not on file    Number of children: Not on file    Years of education: Not on file    Highest education level: Not on file   Occupational History     Employer: [employer]   Social Needs    Financial resource strain: Not on file    Food insecurity:     Worry: Not on file     Inability: Not on file    Transportation needs:     Medical: Not on file     Non-medical: Not on file   Tobacco Use    Smoking status: Never Smoker    Smokeless tobacco: Never Used   Substance and Sexual Activity    Alcohol use: No    Drug use: No    Sexual activity: Not Currently   Lifestyle    Physical activity:     Days per week: Not on file     Minutes per session: Not on file    Stress: Not on file   Relationships    Social connections:     Talks on phone: Not on file     Gets together: Not on file     Attends Buddhism service: Not on file     Active member of club or organization: Not on file     Attends meetings of clubs or organizations: Not on file     Relationship status: Not on file   Other Topics Concern    Patient feels they ought to cut  down on drinking/drug use Not Asked    Patient annoyed by others criticizing their drinking/drug use Not Asked    Patient has felt bad or guilty about drinking/drug use Not Asked    Patient has had a drink/used drugs as an eye opener in the AM Not Asked    Are you pregnant or think you may be? Not Asked    Breast-feeding Not Asked   Social History Narrative    Now on disability       Review of patient's allergies indicates:   Allergen Reactions    Aspirin Hives    Imitrex [sumatriptan] Palpitations    Penicillins Hives and Swelling    Shellfish containing products Anaphylaxis     seafood    Percocet [oxycodone-acetaminophen] Itching     States can take    Reglan [metoclopramide hcl] Other (See Comments)     Parkinsonism          Current Outpatient Medications:     ARIPiprazole (ABILIFY) 10 MG Tab, TAKE 1 TABLET(5 MG) BY MOUTH EVERY EVENING, Disp: 30 tablet, Rfl: 5    baclofen (LIORESAL) 20 MG tablet, Take 1 tablet (20 mg total) by mouth 3 (three) times daily. (Patient taking differently: Take 20 mg by mouth 3 (three) times daily as needed. ), Disp: 90 tablet, Rfl: 11    benzonatate (TESSALON) 100 MG capsule, Take 1 capsule (100 mg total) by mouth 3 (three) times daily as needed., Disp: 30 capsule, Rfl: 0    blood sugar diagnostic Strp, 1 strip by Misc.(Non-Drug; Combo Route) route 2 (two) times daily., Disp: 200 strip, Rfl: 3    blood-glucose meter kit, Please provide with insurance covered meter, Disp: 1 each, Rfl: 0    carvedilol (COREG) 25 MG tablet, TAKE 1 TABLET(25 MG) BY MOUTH TWICE DAILY WITH MEALS, Disp: 180 tablet, Rfl: 3    clonazePAM (KLONOPIN) 1 MG tablet, Take 1 tablet (1 mg total) by mouth daily as needed for Anxiety., Disp: 30 tablet, Rfl: 3    clopidogrel (PLAVIX) 75 mg tablet, TAKE 1 TABLET BY MOUTH ONCE DAILY, Disp: 90 tablet, Rfl: 4    donepezil (ARICEPT) 10 MG tablet, Take 1 tablet (10 mg total) by mouth 2 (two) times daily., Disp: 180 tablet, Rfl: 3    doxycycline  (VIBRAMYCIN) 100 MG Cap, Take 1 capsule (100 mg total) by mouth 2 (two) times daily. for 7 days, Disp: 14 capsule, Rfl: 0    DULoxetine (CYMBALTA) 60 MG capsule, Take 1 capsule (60 mg total) by mouth 2 (two) times daily. (Patient taking differently: Take 60 mg by mouth once daily. ), Disp: 60 capsule, Rfl: 5    fremanezumab-vfrm (AJOVY) 225 mg/1.5 mL injection, Inject 225 mg into the skin every 28 days., Disp: 1.5 mL, Rfl: 12    gabapentin (NEURONTIN) 300 MG capsule, Take 3 capsules (900 mg total) by mouth 3 (three) times daily., Disp: 810 capsule, Rfl: 12    hydrOXYzine (ATARAX) 50 MG tablet, Take 1 tablet (50 mg total) by mouth nightly as needed., Disp: 30 tablet, Rfl: 5    hydrOXYzine HCl (ATARAX) 10 MG Tab, Take 1 tablet (10 mg total) by mouth 3 (three) times daily as needed (anxiety)., Disp: 90 tablet, Rfl: 5    lancets Misc, 1 Device by Misc.(Non-Drug; Combo Route) route 2 (two) times daily., Disp: 200 each, Rfl: 3    losartan (COZAAR) 100 MG tablet, Take 1 tablet (100 mg total) by mouth once daily., Disp: 90 tablet, Rfl: 3    magnesium 200 mg Tab, Take 200 mg by mouth once daily. (Patient taking differently: Take 200 mg by mouth every other day. ), Disp: 30 each, Rfl: 12    ondansetron (ZOFRAN-ODT) 4 MG TbDL, Take 1 tablet (4 mg total) by mouth every 12 (twelve) hours as needed (nausea)., Disp: 10 tablet, Rfl: 1    pantoprazole (PROTONIX) 40 MG tablet, Take 1 tablet (40 mg total) by mouth once daily., Disp: 30 tablet, Rfl: 0    rivaroxaban (XARELTO) 20 mg Tab, Take 1 tablet (20 mg total) by mouth daily with dinner or evening meal., Disp: 30 tablet, Rfl: 11    rosuvastatin (CRESTOR) 40 MG Tab, Take 1 tablet (40 mg total) by mouth every evening., Disp: 90 tablet, Rfl: 3    silver sulfADIAZINE 1% (SILVADENE) 1 % cream, Apply topically 2 (two) times daily., Disp: 50 g, Rfl: 0    tiaGABine (GABITRIL) 4 MG tablet, Take 1 tablet (4 mg total) by mouth every evening., Disp: 30 tablet, Rfl: 12    TRUE  "METRIX GLUCOSE METER Misc, TEST BG BID, Disp: , Rfl: 0    LORazepam (ATIVAN) 0.5 MG tablet, Take 1 tablet (0.5 mg total) by mouth every 6 (six) hours as needed for Anxiety., Disp: 30 tablet, Rfl: 0    Current Facility-Administered Medications:     cyanocobalamin injection 1,000 mcg, 1,000 mcg, Subcutaneous, Once, David Cortez MD    onabotulinumtoxina injection 200 Units, 200 Units, Intramuscular, Q90 Days, David Cortez MD, 200 Units at 10/19/18 1713    onabotulinumtoxina injection 200 Units, 200 Units, Intramuscular, Q90 Days, David Cortez MD, 200 Units at 12/28/18 1106    onabotulinumtoxina injection 200 Units, 200 Units, Intramuscular, Q90 Days, David Cortez MD, 200 Units at 03/13/19 1504    onabotulinumtoxina injection 200 Units, 200 Units, Intramuscular, Q90 Days, David Cortez MD, 200 Units at 05/23/19 1123    onabotulinumtoxina injection 200 Units, 200 Units, Intramuscular, 1 time in Clinic/HOD, David Cortez MD        Review of Systems:  Constitutional: no fever or chills  ENT: no nasal congestion or sore throat  Respiratory: no cough or shortness of breath  Cardiovascular: no chest pain or palpitations  Gastrointestinal: no nausea or vomiting, PUD, GERD, NSAID intolerance  Genitourinary: no hematuria or dysuria  Integument/Breast: no rash or pruritis  Hematologic/Lymphatic: no easy bruising or lymphadenopathy  Musculoskeletal: see HPI  Neurological: no seizures or tremors  Behavioral/Psych: no auditory or visual hallucinations      Objective:      PHYSICAL EXAM:  Vitals:    07/25/19 1530   BP: 132/84   Pulse: 71   Weight: 131.5 kg (290 lb)   Height: 5' 4" (1.626 m)   PainSc: 10-Worst pain ever     General Appearance: WDWN, NAD  Neuro/Psych: Mood & affect appropriate  Lungs: Respirations equal and unlabored.   CV: No apparent CV dysfunction, distal pulses intact, RRR  Skin: Intact throughout  Extremities:   Left Hand Exam     Tenderness   Left hand tenderness location: Tenderness over the A1 " pulley at the base of the middle finger.     Range of Motion   Hand   MP Middle: normal   PIP Middle: abnormal   DIP Middle: abnormal     Tests   Phalens Sign: negative  Tinel's sign (median nerve): negative    Other   Erythema: absent  Sensation: normal  Pulse: present    Comments:  Mild swelling of the middle digit decreased range of motion secondary to swelling and pain. Passive range of motion greater than active.  No active triggering with flexion extension of the digit              Assessment:     Encounter Diagnosis   Name Primary?    Trigger middle finger of left hand Yes        Plan/Discussion:   Amna was seen today for pain.    Diagnoses and all orders for this visit:    Trigger middle finger of left hand  -     Vital signs; Standing  -     Cleanse with Chlorhexidine (CHG); Standing  -     Diet NPO; Standing  -     Chlorohexidine Gluconate Bath; Standing  -     Case Request Operating Room: RELEASE, TRIGGER FINGER left middle  -     Full code; Standing  -     Place in Outpatient; Standing  -     Reason for no Mechanical VTE Prophylaxis; Standing    Other orders  -     0.9%  NaCl infusion  -     IP VTE HIGH RISK PATIENT; Standing     She demonstrates a recurrent left middle finger trigger digit.  She has had several failed injections previously.  I discussed performing a trigger release in the operating room to help permanently relieve her discomfort.  We discussed risks benefits alternatives in detail and she wished to proceed.  Informed consent was obtained at that time and all questions and concerns were addressed.  She is scheduled for August 2, 2019

## 2019-07-25 NOTE — TELEPHONE ENCOUNTER
She can hold the plavix for one week and xarelto for 48 hours prior to the surgery, and restart them asap.

## 2019-07-25 NOTE — PROGRESS NOTES
AAOx3.  Lives w/ daughter but visit occurred alone.  NIHSS-0; does not smoke; refrains from adding salt and salty foods but does have fast food occasionally; compliant w/ meds and assessing daily blood pressure; walks hallways for exercise daily.  Concern re: migraine headaches for which she sees Dr Coughlin.  Education provided on goal of stroke mobile, s/s of stroke, diet, exercise, medications.  Understanding verbalized  Encouraged to utilize stroke team for any concern.

## 2019-07-25 NOTE — TELEPHONE ENCOUNTER
----- Message from Alice Mayberry MA sent at 7/25/2019  4:05 PM CDT -----      ----- Message -----  From: Judy Hernandez LPN  Sent: 7/25/2019   3:57 PM  To: Judy Hernandez LPN, Roberto ABREU Staff, #    Good afternoon,     The patient is having trigger finger release surgery on 8/2/2019 with Dr. Pugh. Is it okay to hold her Xarelto and Plavix 5-7 days before surgery?    Thanks!

## 2019-07-25 NOTE — H&P (VIEW-ONLY)
Chief Complaint: Pain of the Left Hand      HPI:  Mrs. Chawla returns to clinic today complaining of left hand pain.  The patient states that approximately 2 weeks ago she sustained a burn to the dorsal aspect of her left middle finger PIP joint.  After which she has noted increased swelling and pain decreased range of motion of the digit. She was seen by primary care physician who placed her on antibiotics however the swelling remained.  She denies any active triggering she does report decreased range of motion of the middle digit as well as pain on the volar aspect.  No numbness or tingling no recent history of trauma other than the recent burn.  No other complaints today    Past Medical History:   Diagnosis Date    Anticoagulant long-term use     Anxiety     Asthma     Atrial fibrillation     Brain anoxic injury     Cervicalgia 8/28/2014    CHI (closed head injury) 2/19/2013    Convulsion 5/30/2015    Decreased ROM of left shoulder 4/12/2017    Defibrillator activation 2013    Depression     Heart block     History of sudden cardiac arrest 2/2013    PEA arrest with subsequent long-QT    Hx of psychiatric care     Hypertension     Left atrial enlargement 4/11/2018    Pacemaker 2013    Paresthesia 11/1/2013    Prolonged Q-T interval on ECG 2/8/2013    Psychiatric problem     Seizures     Sleep difficulties     Stroke     weakness lt side    Therapy     Thyroid disease     Upper airway resistance syndrome 2/21/2017       Past Surgical History:   Procedure Laterality Date    breast reduction      CARDIAC DEFIBRILLATOR PLACEMENT      CARDIAC DEFIBRILLATOR PLACEMENT      CARPAL TUNNEL RELEASE Right     colon  N/A 6/24/2014    Performed by hCemo Bustamante [facility] at [facility] (2ND FLR)    COLONOSCOPY N/A 5/7/2019    Performed by Juan Coffey MD at [facility] (2ND FLR)    egd N/A 6/24/2014    Performed by Chemo Bustamante MD at [facility] (2ND FLR)    HERNIA REPAIR      HIATAL HERNIA REPAIR       INSERT / REPLACE / REMOVE PACEMAKER  10/2017    CRT-D upgrade    INSERTION, CARDIAC PACEMAKER, DUAL CHAMBER N/A 2/15/2013    Performed by Humberto Martins [facility] at [facility] C[facility] LAB    INSERTION-ICD-BIVENTRICULAR N/A 10/13/2017    Performed by Avelino Mejia MD at [facility] CATH LAB    RELEASE-CARPAL TUNNEL Left 2/3/2017    Performed by Matt Pugh Jr., MD at [facility] [place], operated by [facility] OR    RELEASE-CARPAL TUNNEL Right 12/9/2016    Performed by Matt Pugh Jr., MD at [facility] [place] OR    TOTAL REDUCTION MAMMOPLASTY      TUBAL LIGATION      VENOGRAM N/A 10/13/2017    Performed by Avelino Mejia MD at [facility] CATH LAB        Family History   Problem Relation Age of Onset    Hypertension Mother     COPD Unknown     Heart failure Unknown     Glaucoma Maternal Grandmother     Glaucoma Paternal Grandmother        Social History     Socioeconomic History    Marital status: Single     Spouse name: Not on file    Number of children: Not on file    Years of education: Not on file    Highest education level: Not on file   Occupational History     Employer: [employer]   Social Needs    Financial resource strain: Not on file    Food insecurity:     Worry: Not on file     Inability: Not on file    Transportation needs:     Medical: Not on file     Non-medical: Not on file   Tobacco Use    Smoking status: Never Smoker    Smokeless tobacco: Never Used   Substance and Sexual Activity    Alcohol use: No    Drug use: No    Sexual activity: Not Currently   Lifestyle    Physical activity:     Days per week: Not on file     Minutes per session: Not on file    Stress: Not on file   Relationships    Social connections:     Talks on phone: Not on file     Gets together: Not on file     Attends Yarsani service: Not on file     Active member of club or organization: Not on file     Attends meetings of clubs or organizations: Not on file     Relationship status: Not on file   Other Topics Concern    Patient feels they ought to cut  down on drinking/drug use Not Asked    Patient annoyed by others criticizing their drinking/drug use Not Asked    Patient has felt bad or guilty about drinking/drug use Not Asked    Patient has had a drink/used drugs as an eye opener in the AM Not Asked    Are you pregnant or think you may be? Not Asked    Breast-feeding Not Asked   Social History Narrative    Now on disability       Review of patient's allergies indicates:   Allergen Reactions    Aspirin Hives    Imitrex [sumatriptan] Palpitations    Penicillins Hives and Swelling    Shellfish containing products Anaphylaxis     seafood    Percocet [oxycodone-acetaminophen] Itching     States can take    Reglan [metoclopramide hcl] Other (See Comments)     Parkinsonism          Current Outpatient Medications:     ARIPiprazole (ABILIFY) 10 MG Tab, TAKE 1 TABLET(5 MG) BY MOUTH EVERY EVENING, Disp: 30 tablet, Rfl: 5    baclofen (LIORESAL) 20 MG tablet, Take 1 tablet (20 mg total) by mouth 3 (three) times daily. (Patient taking differently: Take 20 mg by mouth 3 (three) times daily as needed. ), Disp: 90 tablet, Rfl: 11    benzonatate (TESSALON) 100 MG capsule, Take 1 capsule (100 mg total) by mouth 3 (three) times daily as needed., Disp: 30 capsule, Rfl: 0    blood sugar diagnostic Strp, 1 strip by Misc.(Non-Drug; Combo Route) route 2 (two) times daily., Disp: 200 strip, Rfl: 3    blood-glucose meter kit, Please provide with insurance covered meter, Disp: 1 each, Rfl: 0    carvedilol (COREG) 25 MG tablet, TAKE 1 TABLET(25 MG) BY MOUTH TWICE DAILY WITH MEALS, Disp: 180 tablet, Rfl: 3    clonazePAM (KLONOPIN) 1 MG tablet, Take 1 tablet (1 mg total) by mouth daily as needed for Anxiety., Disp: 30 tablet, Rfl: 3    clopidogrel (PLAVIX) 75 mg tablet, TAKE 1 TABLET BY MOUTH ONCE DAILY, Disp: 90 tablet, Rfl: 4    donepezil (ARICEPT) 10 MG tablet, Take 1 tablet (10 mg total) by mouth 2 (two) times daily., Disp: 180 tablet, Rfl: 3    doxycycline  (VIBRAMYCIN) 100 MG Cap, Take 1 capsule (100 mg total) by mouth 2 (two) times daily. for 7 days, Disp: 14 capsule, Rfl: 0    DULoxetine (CYMBALTA) 60 MG capsule, Take 1 capsule (60 mg total) by mouth 2 (two) times daily. (Patient taking differently: Take 60 mg by mouth once daily. ), Disp: 60 capsule, Rfl: 5    fremanezumab-vfrm (AJOVY) 225 mg/1.5 mL injection, Inject 225 mg into the skin every 28 days., Disp: 1.5 mL, Rfl: 12    gabapentin (NEURONTIN) 300 MG capsule, Take 3 capsules (900 mg total) by mouth 3 (three) times daily., Disp: 810 capsule, Rfl: 12    hydrOXYzine (ATARAX) 50 MG tablet, Take 1 tablet (50 mg total) by mouth nightly as needed., Disp: 30 tablet, Rfl: 5    hydrOXYzine HCl (ATARAX) 10 MG Tab, Take 1 tablet (10 mg total) by mouth 3 (three) times daily as needed (anxiety)., Disp: 90 tablet, Rfl: 5    lancets Misc, 1 Device by Misc.(Non-Drug; Combo Route) route 2 (two) times daily., Disp: 200 each, Rfl: 3    losartan (COZAAR) 100 MG tablet, Take 1 tablet (100 mg total) by mouth once daily., Disp: 90 tablet, Rfl: 3    magnesium 200 mg Tab, Take 200 mg by mouth once daily. (Patient taking differently: Take 200 mg by mouth every other day. ), Disp: 30 each, Rfl: 12    ondansetron (ZOFRAN-ODT) 4 MG TbDL, Take 1 tablet (4 mg total) by mouth every 12 (twelve) hours as needed (nausea)., Disp: 10 tablet, Rfl: 1    pantoprazole (PROTONIX) 40 MG tablet, Take 1 tablet (40 mg total) by mouth once daily., Disp: 30 tablet, Rfl: 0    rivaroxaban (XARELTO) 20 mg Tab, Take 1 tablet (20 mg total) by mouth daily with dinner or evening meal., Disp: 30 tablet, Rfl: 11    rosuvastatin (CRESTOR) 40 MG Tab, Take 1 tablet (40 mg total) by mouth every evening., Disp: 90 tablet, Rfl: 3    silver sulfADIAZINE 1% (SILVADENE) 1 % cream, Apply topically 2 (two) times daily., Disp: 50 g, Rfl: 0    tiaGABine (GABITRIL) 4 MG tablet, Take 1 tablet (4 mg total) by mouth every evening., Disp: 30 tablet, Rfl: 12    TRUE  "METRIX GLUCOSE METER Misc, TEST BG BID, Disp: , Rfl: 0    LORazepam (ATIVAN) 0.5 MG tablet, Take 1 tablet (0.5 mg total) by mouth every 6 (six) hours as needed for Anxiety., Disp: 30 tablet, Rfl: 0    Current Facility-Administered Medications:     cyanocobalamin injection 1,000 mcg, 1,000 mcg, Subcutaneous, Once, David Cortez MD    onabotulinumtoxina injection 200 Units, 200 Units, Intramuscular, Q90 Days, David Cortez MD, 200 Units at 10/19/18 1713    onabotulinumtoxina injection 200 Units, 200 Units, Intramuscular, Q90 Days, David Cortez MD, 200 Units at 12/28/18 1106    onabotulinumtoxina injection 200 Units, 200 Units, Intramuscular, Q90 Days, David Cortez MD, 200 Units at 03/13/19 1504    onabotulinumtoxina injection 200 Units, 200 Units, Intramuscular, Q90 Days, David Cortez MD, 200 Units at 05/23/19 1123    onabotulinumtoxina injection 200 Units, 200 Units, Intramuscular, 1 time in Clinic/HOD, David Cortez MD        Review of Systems:  Constitutional: no fever or chills  ENT: no nasal congestion or sore throat  Respiratory: no cough or shortness of breath  Cardiovascular: no chest pain or palpitations  Gastrointestinal: no nausea or vomiting, PUD, GERD, NSAID intolerance  Genitourinary: no hematuria or dysuria  Integument/Breast: no rash or pruritis  Hematologic/Lymphatic: no easy bruising or lymphadenopathy  Musculoskeletal: see HPI  Neurological: no seizures or tremors  Behavioral/Psych: no auditory or visual hallucinations      Objective:      PHYSICAL EXAM:  Vitals:    07/25/19 1530   BP: 132/84   Pulse: 71   Weight: 131.5 kg (290 lb)   Height: 5' 4" (1.626 m)   PainSc: 10-Worst pain ever     General Appearance: WDWN, NAD  Neuro/Psych: Mood & affect appropriate  Lungs: Respirations equal and unlabored.   CV: No apparent CV dysfunction, distal pulses intact, RRR  Skin: Intact throughout  Extremities:   Left Hand Exam     Tenderness   Left hand tenderness location: Tenderness over the A1 " pulley at the base of the middle finger.     Range of Motion   Hand   MP Middle: normal   PIP Middle: abnormal   DIP Middle: abnormal     Tests   Phalens Sign: negative  Tinel's sign (median nerve): negative    Other   Erythema: absent  Sensation: normal  Pulse: present    Comments:  Mild swelling of the middle digit decreased range of motion secondary to swelling and pain. Passive range of motion greater than active.  No active triggering with flexion extension of the digit              Assessment:     Encounter Diagnosis   Name Primary?    Trigger middle finger of left hand Yes        Plan/Discussion:   Amna was seen today for pain.    Diagnoses and all orders for this visit:    Trigger middle finger of left hand  -     Vital signs; Standing  -     Cleanse with Chlorhexidine (CHG); Standing  -     Diet NPO; Standing  -     Chlorohexidine Gluconate Bath; Standing  -     Case Request Operating Room: RELEASE, TRIGGER FINGER left middle  -     Full code; Standing  -     Place in Outpatient; Standing  -     Reason for no Mechanical VTE Prophylaxis; Standing    Other orders  -     0.9%  NaCl infusion  -     IP VTE HIGH RISK PATIENT; Standing     She demonstrates a recurrent left middle finger trigger digit.  She has had several failed injections previously.  I discussed performing a trigger release in the operating room to help permanently relieve her discomfort.  We discussed risks benefits alternatives in detail and she wished to proceed.  Informed consent was obtained at that time and all questions and concerns were addressed.  She is scheduled for August 2, 2019

## 2019-07-26 NOTE — TELEPHONE ENCOUNTER
Called to inform patient of the following information per Dr. Martins:     She can hold the plavix for one week and xarelto for 48 hours prior to the surgery, and restart them asap.    Patient verbalized understanding.

## 2019-07-31 ENCOUNTER — EXTERNAL CHRONIC CARE MANAGEMENT (OUTPATIENT)
Dept: PRIMARY CARE CLINIC | Facility: CLINIC | Age: 54
End: 2019-07-31
Payer: MEDICARE

## 2019-07-31 PROCEDURE — 99490 PR CHRONIC CARE MGMT, 1ST 20 MIN: ICD-10-PCS | Mod: S$PBB,,, | Performed by: PSYCHIATRY & NEUROLOGY

## 2019-07-31 PROCEDURE — 99490 CHRNC CARE MGMT STAFF 1ST 20: CPT | Mod: S$PBB,,, | Performed by: PSYCHIATRY & NEUROLOGY

## 2019-07-31 PROCEDURE — 99490 CHRNC CARE MGMT STAFF 1ST 20: CPT | Mod: PBBFAC | Performed by: PSYCHIATRY & NEUROLOGY

## 2019-08-01 ENCOUNTER — HOSPITAL ENCOUNTER (OUTPATIENT)
Dept: PREADMISSION TESTING | Facility: OTHER | Age: 54
Discharge: HOME | End: 2019-08-01
Attending: PLASTIC SURGERY
Payer: MEDICARE

## 2019-08-01 ENCOUNTER — TELEPHONE (OUTPATIENT)
Dept: ORTHOPEDICS | Facility: CLINIC | Age: 54
End: 2019-08-01

## 2019-08-01 ENCOUNTER — ANESTHESIA EVENT (OUTPATIENT)
Dept: SURGERY | Facility: OTHER | Age: 54
End: 2019-08-01
Payer: MEDICARE

## 2019-08-01 ENCOUNTER — PROCEDURE VISIT (OUTPATIENT)
Dept: NEUROLOGY | Facility: CLINIC | Age: 54
End: 2019-08-01
Payer: MEDICARE

## 2019-08-01 VITALS
HEART RATE: 60 BPM | SYSTOLIC BLOOD PRESSURE: 150 MMHG | WEIGHT: 288.81 LBS | DIASTOLIC BLOOD PRESSURE: 80 MMHG | BODY MASS INDEX: 49.31 KG/M2 | HEIGHT: 64 IN

## 2019-08-01 VITALS
SYSTOLIC BLOOD PRESSURE: 145 MMHG | HEART RATE: 88 BPM | OXYGEN SATURATION: 96 % | BODY MASS INDEX: 49.51 KG/M2 | HEIGHT: 64 IN | WEIGHT: 290 LBS | TEMPERATURE: 98 F | DIASTOLIC BLOOD PRESSURE: 90 MMHG

## 2019-08-01 DIAGNOSIS — E78.2 MIXED HYPERLIPIDEMIA: ICD-10-CM

## 2019-08-01 DIAGNOSIS — I10 ESSENTIAL HYPERTENSION: ICD-10-CM

## 2019-08-01 DIAGNOSIS — G50.0 SUPRAORBITAL NEURALGIA: ICD-10-CM

## 2019-08-01 DIAGNOSIS — I48.0 PAROXYSMAL ATRIAL FIBRILLATION: ICD-10-CM

## 2019-08-01 DIAGNOSIS — G47.8 UPPER AIRWAY RESISTANCE SYNDROME: ICD-10-CM

## 2019-08-01 DIAGNOSIS — G43.711 CHRONIC MIGRAINE WITHOUT AURA, WITH INTRACTABLE MIGRAINE, SO STATED, WITH STATUS MIGRAINOSUS: ICD-10-CM

## 2019-08-01 DIAGNOSIS — I42.8 NONISCHEMIC CARDIOMYOPATHY: ICD-10-CM

## 2019-08-01 DIAGNOSIS — E66.01 OBESITY, CLASS III, BMI 40-49.9 (MORBID OBESITY): Primary | ICD-10-CM

## 2019-08-01 PROCEDURE — 64615 PR CHEMODENERVATION OF MUSCLE FOR CHRONIC MIGRAINE: ICD-10-PCS | Mod: S$PBB,,, | Performed by: PSYCHIATRY & NEUROLOGY

## 2019-08-01 PROCEDURE — 64615 CHEMODENERV MUSC MIGRAINE: CPT | Mod: PBBFAC | Performed by: PSYCHIATRY & NEUROLOGY

## 2019-08-01 PROCEDURE — 64615 CHEMODENERV MUSC MIGRAINE: CPT | Mod: S$PBB,,, | Performed by: PSYCHIATRY & NEUROLOGY

## 2019-08-01 RX ORDER — ONDANSETRON 4 MG/1
4 TABLET, ORALLY DISINTEGRATING ORAL EVERY 12 HOURS PRN
Qty: 10 TABLET | Refills: 1 | Status: SHIPPED | OUTPATIENT
Start: 2019-08-01 | End: 2020-03-16 | Stop reason: SDUPTHER

## 2019-08-01 RX ORDER — SODIUM CHLORIDE, SODIUM LACTATE, POTASSIUM CHLORIDE, CALCIUM CHLORIDE 600; 310; 30; 20 MG/100ML; MG/100ML; MG/100ML; MG/100ML
INJECTION, SOLUTION INTRAVENOUS CONTINUOUS
Status: CANCELLED | OUTPATIENT
Start: 2019-08-01

## 2019-08-01 RX ORDER — TIZANIDINE 4 MG/1
4 TABLET ORAL EVERY 6 HOURS PRN
Qty: 90 TABLET | Refills: 12 | Status: SHIPPED | OUTPATIENT
Start: 2019-08-01 | End: 2020-11-05

## 2019-08-01 RX ORDER — ROSUVASTATIN CALCIUM 40 MG/1
TABLET, COATED ORAL
Qty: 90 TABLET | Refills: 0 | Status: SHIPPED | OUTPATIENT
Start: 2019-08-01 | End: 2019-12-04 | Stop reason: SDUPTHER

## 2019-08-01 RX ADMIN — ONABOTULINUMTOXINA 200 UNITS: 100 INJECTION, POWDER, LYOPHILIZED, FOR SOLUTION INTRADERMAL; INTRAMUSCULAR at 11:08

## 2019-08-01 NOTE — TELEPHONE ENCOUNTER
Spoke with pt informing her of arrive time is 7:30am for surgery with Dr Pugh. Pt understood and po appt was made.

## 2019-08-01 NOTE — ANESTHESIA PREPROCEDURE EVALUATION
08/01/2019  Amna Chawla is a 54 y.o., female.    Anesthesia Evaluation    I have reviewed the Patient Summary Reports.    I have reviewed the Nursing Notes.   I have reviewed the Medications.     Review of Systems  Anesthesia Hx:  No problems with previous Anesthesia  History of prior surgery of interest to airway management or planning: Previous anesthesia: General 12/16 [facility], propofol qqt with general anesthesia.  Denies Family Hx of Anesthesia complications.   Denies Personal Hx of Anesthesia complications.   Social:  Non-Smoker    Hematology/Oncology:  Hematology Normal   Oncology Normal     Cardiovascular:   Pacemaker ([employer] AICD) Hypertension, well controlled Denies CAD.   Dysrhythmias  Says no blockages or valvular heart conditions.  Only rhythmn  Disturbance.  MERCHANT at two blocks   Pulmonary:   Asthma asymptomatic Past hx of asthma but no attacks in years   Renal/:  Renal/ Normal     Hepatic/GI:   GERD, well controlled    Musculoskeletal:   botox injections for cervical spasms and occipital headache Spine Disorders: cervical and lumbar    Neurological:   CVA, residual symptoms Seizures Left sided weakness post CVA 2013/anoxic brain injury    Last seizure was in 2014, then only partial   Endocrine:  Endocrine Normal        Physical Exam  General:  Morbid Obesity    Airway/Jaw/Neck:  Airway Findings: Mouth Opening: Normal Tongue: Large  General Airway Assessment: Adult  Mallampati: II  TM Distance: Normal, at least 6 cm         Dental:  Dental Findings: In tact             Anesthesia Plan  Type of Anesthesia, risks & benefits discussed:  Anesthesia Type:  general  Patient's Preference:   Intra-op Monitoring Plan: standard ASA monitors  Intra-op Monitoring Plan Comments:   Post Op Pain Control Plan:   Post Op Pain Control Plan Comments:   Induction:    Beta Blocker:         Informed  Consent: Patient understands risks and agrees with Anesthesia plan.  Questions answered. Anesthesia consent signed with patient.  ASA Score: 3     Day of Surgery Review of History & Physical:    H&P update referred to the surgeon.     Anesthesia Plan Notes: IV propofol for local injection by surgeon          Ready For Surgery From Anesthesia Perspective.

## 2019-08-01 NOTE — DISCHARGE INSTRUCTIONS
PRE-ADMIT TESTING -  [phone]    [address]  [place] [facility]          Your surgery has been scheduled at [facility]. We are pleased to have the opportunity to serve you. For Further Information please call [phone].    On the day of surgery please report to the Information Desk on the 1st floor.    · CONTACT YOUR PHYSICIAN'S OFFICE THE DAY PRIOR TO YOUR SURGERY TO OBTAIN YOUR ARRIVAL TIME.     · The evening before surgery do not eat anything after 9 p.m. ( this includes hard candy, chewing gum and mints).  You may only have GATORADE, POWERADE AND WATER  from 9 p.m. until you leave your home.   DO NOT DRINK ANY LIQUIDS ON THE WAY TO THE HOSPITAL.      SPECIAL MEDICATION INSTRUCTIONS: TAKE medications checked off by the Anesthesiologist on your Medication List.    Angiogram Patients: Take medications as instructed by your physician, including aspirin.     Surgery Patients:    If you take ASPIRIN - Your PHYSICIAN/SURGEON will need to inform you IF/OR when you need to stop taking aspirin prior to your surgery.     Do Not take any medications containing IBUPROFEN.  Do Not Wear any make-up or dark nail polish   (especially eye make-up) to surgery. If you come to surgery with makeup on you will be required to remove the makeup or nail polish.    Do not shave your surgical area at least 5 days prior to your surgery. The surgical prep will be performed at the hospital according to Infection Control regulations.    Leave all valuables at home.   Do Not wear any jewelry or watches, including any metal in body piercings. Jewelry must be removed prior to coming to the hospital.  There is a possibility that rings that are unable to be removed may be cut off if they are on the surgical extremity.    Contact Lens must be removed before surgery. Either do not wear the contact lens or bring a case and solution for storage.  Please bring a container for eyeglasses or dentures as required.  Bring  any paperwork your physician has provided, such as consent forms,  history and physicals, doctor's orders, etc.   Bring comfortable clothes that are loose fitting to wear upon discharge. Take into consideration the type of surgery being performed.  Maintain your diet as advised per your physician the day prior to surgery.      Adequate rest the night before surgery is advised.   Park in the Parking lot behind the hospital or in the [facility] across the street from the parking lot. Parking is complimentary.  If you will be discharged the same day as your procedure, please arrange for a responsible adult to drive you home or to accompany you if traveling by taxi.   YOU WILL NOT BE PERMITTED TO DRIVE OR TO LEAVE THE HOSPITAL ALONE AFTER SURGERY.   It is strongly recommended that you arrange for someone to remain with you for the first 24 hrs following your surgery.    The Surgeon will speak to your family/visitor after your surgery regarding the outcome of your surgery and post op care.  The Surgeon may speak to you after your surgery, but there is a possibility you may not remember the details.  Please check with your family members regarding the conversation with the Surgeon.      Thank you for your cooperation.  The Staff of [facility].                Bathing Instructions with Hibiclens     Shower the evening before and morning of your procedure with Hibiclens:   Wash your face with water and your regular face wash/soap   Apply Hibiclens directly on your skin or on a wet washcloth and wash gently. When showering: Move away from the shower stream when applying Hibiclens to avoid rinsing off too soon.   Rinse thoroughly with warm water   Do not dilute Hibiclens         Dry off as usual, do not use any deodorant, powder, body lotions, perfume, after shave or cologne.

## 2019-08-01 NOTE — PROCEDURES
Follow up:   HA overall stable in Hz and intensity   Reports a wearing off effect of BOTOX since last week   Taking zanaflex 8 mg TID       Prior note:   HA started 12/24   She feels BOTOX wore off last week   Reports GI distress from taking to many motrin      Prior note:   4/week HA - L vertex   Jabs - vertex either sides      She reports migraine that woke her up in the morning - associated with L side facial droop      Prior note:   She gets acceptable relief for 2.5 months after BOTOX      Prior note:   No recurrent stroke symptoms   starting having whole body tremors since this AM. Took 1 tablet of lorazepam   Last night had a HA, took trazodone       Admit Date: 4/11/2018  2:03 PM   Discharge Date and Time: 4/12/2018  8:30 PM   History of Present Illness: Ms. Chawla is a 53 yo F with afib on Eliquis (last dose this am), prior cardiac arrest with associated acute ischemic stroke with residual mild L sided weakness, CAD with ICD in situ, HTN, and HLD who presented with acute R sided weakness and sensation changes.  She originally called EMS for palpitations, but developed acute right sided facial droop and RUE weakness at 1:40pm today, after EMS had arrived.  She denies changes to speech or vision.  SBP en route 200/100.  She is ineligible for tPA 2/2 Eliquis use.  She is not suspected to have an LVO.  She will be admitted to  for observation overnight.     Hospital Course (synopsis of major diagnoses, care, treatment, and services provided during the course of the hospital stay): Ms. Chawla was admitted for acute stroke workup. Pt with pacemaker so unable to obtain MRI Brain; CTA H/N demonstrated no evidence acute infarction, though did exhibit noncalcified plaquing of carotid bifurcations and proximal ICAs with < 50% stenosis, so Plavix was added to pt's daily home regimen. Concerned for TIA at this time though Migraine very high on differential due to pt's hx headaches, including presently this  admission. Hypertensive urgency/emergency also considered due to pt's very elevated levels with EMS. Per chart review, Eliquis was a new medication since 4/3/18 (had switched from Coumadin), however staff Faraz concerned that pt had a potential event while on Eliquis. She wished to switch to Pradaxa as an alternative DOAC (as it has an option for reversal), however changed to Xarelto per pt's insurance coverage.   While admitted, pt given cocktail for HA which she has received previously at the infusion clinic - IV Magnesium, IM Toradol, 2mg IV Morphine, 500cc NS bolus (IV Benadryl not included this time as pt had received a dose earlier that day prior to contrast imaging.) HA improved somewhat and pt was encouraged to follow up with Dr. Cortez for continued management of botox injections, etc. At that time, pt also found with hyponatremia; d/c'd Clorthalidone and plan was made for pt to [facility]16[facility]18 for repeat CMP to evaluate Na level and BP check since discontinuing this medication.  Ms. Chawla was evaluated and treated by PT, OT, and SLP who recommend d/c with outpatient PT and OT. She was discharged home 4/12/18 with [facility] follow-up Monday      Prior note:   2 weeks ago BOTOX effect wore off   Used up all her percocet   3 wks h/o:   Reports frequent falls - falling forward, no LOC, no injuries, able to protect falls by hands   triggers - unknown   Also occ stumbling   Dragging L foot   No Pain in back or legs   No numbness or weakness in legs   No B/B incontinence   No lightheadedness      Prior note:    Flare up of TMJ and HA during daughter's wedding   NSAIDS helped   2 weeks ago BOTOX effect wore off      Prior note:   2 weeks ago BOTOX effect wore off   Has severe spasm and pain in the traps catalina   Weather change has provoked severe migraine + nausea   She started coumadin       Prior note:   3 weeks ago BOTOX effect wore off   She reports severe daily HA    During  BOTOX periods she feels she  her HA. Much less intense      Prior note:   The patient is a 51-year-old female who [facility]. Since her last visit, she reports growing frustration   about the fact that nothing has helped her with regards to her headaches. She   continues to experience daily headaches. She takes mefenamic acid and   tizanidine every night, which only provides her two hours of relief. She is   unable to sleep because of the refractory headaches. She is incapacitated   because of severe headaches. She reports minimal relief with infusions that she  gets on a periodic basis. She does report an improvement overall with the   Botox. However, this effect has worn off in the last two weeks. She also   reports an episode wherein she fell with loss of consciousness, which was not   provoked. As a result, she injured her ankle and is currently wearing a boot.      Prior note:   since last week - Reports vertigo for 6 - 7 minutes upto 3 times daily   Nearly daily severe HA - no response to ponstel , compazine   She is late for her BOTOX - last 2 weeks were painful       Follow up:   R sided Headache is constant max at TMJ on R   Prior note:   She reports new episodes of R face tingling. Sh also reports 2 episodes of blurred vision lasting 15 minutes. These hapended while watching TV. Her pain remains unchanged   Follow up:   2 days of severe HA during Thanksgiving   Pounding bifrontal region   Pain worse over L eye brow   Follow up:   Reports bifrontal severe HA (worse on L) with no nausea with sudden onset . Occurs daily   NO note:  I found no papilledema or other changes to suggest elevated intracranial pressure or other alternative etiology for her headaches. Repeat exam in two years.  HA are less frequent and less intense.   (R levator scapula spasm)   symptoms better with lateral flexion to L   Prior note:   She still has daily HA with severe  "sharp stabbing pain behind L eye lasting for 15 sec   Prior note:   Daily pain. 2/week - nausea.  Shu Lares RN at 9/17/2014 4:53 PM  Pt presented to clinic for medical advice. Pt is seen by Dr. Paredes and Dr. Cortez.  1) She went ER on 9/13/14 for a migraine. She was given Reglan, Benadryl, MSO4 and IVF. A couple days later she developed an all over body "tremor". She states "I think I have Parkinson's". - Per Dr. Paredes, medication related tremor (Reglan & Benadryl). Informed her it should wear off in a few days. If not, she is to call and let us know.  2) Headaches - triggered by insomnia.   Current meds:  Ketoprofen - she took it once and said her "throat closed up" and she's not supposed to have anything with aspirin in it.  Nortriptyline - she was taking it at night, but it didn't help her sleep, so she stopped it.  Per Dr. Cortez, discontinue Ketoprofen and Nortriptyline. Start Effexor XR 37.5mg QD, tizanidine 4mg BID PRN headache and Klonopin 0.25 - 0.5mg QHS PRN. Will schedule pt for sphenocath procedure within the next week.   Prior note:   46 yo woman with history of HTN and asthma, both reportedly controlled, in hospital early 2013 after "passed out" and became unresponsive. CPR in the field for 8 minutes until EMS arrived. Per ED note, on arrival she was found to be in PEA, given epinephrine. Vfib/Vtach was achieved which was shocked x1. Patient converted to sinus tachycardia after shock. I do not know the time course for the above treatment. On arrival to facility patient was in sinus tachycardia with strong pulses, intubated and unresponsive. ET tube placement was confirmed with direct laryngoscopy and good bilateral breath sounds were heard after the tube was pulled back 2 cm. Reported to have "withdrawal" movements in ER during stabilization, then sedated and cooling protocol initiated. Had remarkable   recovery and first seen in clinic in April 2013.   Headache history: cluster   Age of onset " "-    Location - behind L eye   Nature of pain - sharp   Prodrome - no   Aura - No   Duration of headache - 6-7 min   Time to peak intensity -   Pain scale - range of intensity - 9/10   Frequency - daily   Status Migrainosus history - 1-3 days   Time of day of most headaches- anytime   Associated symptoms with the headache:   Red eyes   swelling of L face   Headache history: Migraine   Age of onset -    Location - bifrontal   Nature of pain - Pounding   Prodrome - no   Aura - No   Duration of headache - > 4 hrs   Time to peak intensity -   Pain scale - range of intensity - 9/10   Frequency - 5/week   Status Migrainosus history - 1-3 days   Time of day of most headaches- anytime   Associated symptoms with the headache:   Meningeal symptoms - photophobia, phonophobia, exercise intolerance +   Nausea/vomitting +   Nasal drainage   Visual blurriness +   Pallor/flushing   Dizziness   Vertigo   Confusion   Impaired concentration +   Pain worsened with physical activity +   Neck pain +   Symptoms of increased intracranial pressure:   Whooshing sounds - no   Visual spots/blotches - no   Headache Triggers: unknown   Other comorbid conditions:   Anxiety - no   Motion sickness symptom - no       Treatment history:   Resolution of headache with sleep - yes       Meds tried:   Trigger points involvin/9/15 helped for 1 day   Site: Right   Levator scapula   Pharmacological agent:   0.5% Sensorcaine solution   No complications were noted.  Supraorbital block - helped for 2 days   Tylenol - not help   imitrex - chest tightness   alleve - help   topamax - not helping   Neurontin - not helping   Paxil, Elavil - not help   seroquel - nightmare   Ketoprofen - she took it once and said her "throat closed up" and she's not supposed to have anything with aspirin in it.  Nortriptyline - she was taking it at night, but it didn't help her sleep, so she stopped it.  reglan - parkinsonism   zanaflex - help   Toradol 30 mg IM inj at " home - too expensive   BOTOX round #1 9/3/15 - helped (R levator scapula spasm)   Round #2 12/3/15 - helped   Round#3 3/316 - helped   Round #4 6/1/16 - helped   BOTOX#5 9/15/16 - helped     BOTOX#6 - 11/30/16 - helped   BOTOX#7 - 3/9/17 - helped    BOTOX#8 - 5/25/17 - helped    BOTOX#9 8/10/17 - helped   BOTOX#10 10/19/17 - helped   BOTOX#11 12/27/17 - helped   BOTOX#12 3/7/18 - helped   BOTOX#13 5/16/18 - helped    BOTOX#14 8/1/18 - helped     BOTOX#15 10/19/18 - helped      BOTOX#16 12/28/18 - helped   BOTOX#17 3/13/19 - helped    BOTOX#18 5/23/19 - helped     AIMOVIG (sept 1rst week, Oct first week, Nov first wk 140 mg, Dec first wk 140 mg, Jan, Feb) no benefit   Constipation +      Can not do RFA - pt has pacemaker   Procedure: IOVERA peripheral nerve focus cold therapy (non ablative cryotherapy) 12/30/15 - not help   Indication: Cryotherapy of select peripheral nerves of the head was performed to treat chronic headaches that failed to respond to several preventive pharmacological therapies.   Sedation: None   Informed consent: The procedure, risks, benefits and options were discussed with the patient. There are no contraindications to the procedure. The patient expressed understanding and agreed to the procedure. I verify that I personally obtained the patient's consent prior to the start of the procedure.  Location:  Bilateral Supra orbital nerve (3 cycles on R and 1 cycle on L)   Bilateral Occipital nerve   3 cycles   Pain relief: Pain score reduced 10/10->0/10   9/26 Bilateral Sphenopalatine Ganglion and bilateral Maxillary Branch (Trigeminal Nerve) Block - no help   ONB - not help                                                                                                                                                                                                      Shannon Pagan RN at 12/31/2014 3:54 PM                Status: Signed                            Expand All Collapse All   HEAD[facility]HE  FASTTRACK  PAIN 7/10 NAUSEA 0/10  ROUND 1: TORADOL, IMITREX, MORPHINE, BENADRYL, AND MAGNESIUM  PAIN 7/10 NAUSEA 0/10  PT STATED SHE WAS READY TO GO HOME AND GO TO SLEEP. PT D/C'ED IN NAD                   Shannon Pagan RN at 10/5/2015 12:49 PM                Status: Signed                            Expand All Collapse All   HEADACHE FASTTRACK  PAIN 8/10 NAUSEA 10/10  FIRST ROUND: tORADOL, BENADRYL, COMPAZINE, IMITREX, MAGNESIUM, AND VALPROATE.  PAIN 1/10 NAUSEA 0/10  PT READY TO GO HOME AND FEELING BETTER. PT LEFT IN NAD WITH FAMILY MEMBER  TOTAL TIME: 0972-6600              Shannon Pagan RN at 10/20/2015 3:05 PM                Status: Signed                            Expand All Collapse All   HEADACHE FASTTRACK  PAIN 10/10 NAUSEA 0/10  FIRST ROUND: tORADOL, BENADRYL, COMPAZINE, IMITREX, MAGNESIUM, AND VALPROATE.  BP BEING MONITORED EVERY 15 MIN AND REMAINED 170'S/80-90'S. DR CHOPRA NOTIFIED AND STATED TO MAKE SURE BP DID NOT EXCEED 180 SYSTOLIC OR PT SHOULD REPORT TO ED. BP AT 1500 183/92. DR CHOPRA NOTIFIED AND GAVE ORDER TO STOP INFUSION AND SEND PATIENT TO ED. PT BROUGHT TO ED BY INFUSION NURSE VIA WHEELCHAIR.   PAIN 8/10 NAUSEA 0/10  TOTAL TIME: 6961-0097                               Progress Notes                                    Shannon Pagan RN at 2/24/2016 1:36 PM       Status: Signed                  Expand All Collapse All   HEADACHE FASTTRACK  PAIN 10/10 NAUSEA 7/10  FIRST ROUND: tORADOL, BENADRYL, AND MORPHINE-BP RANGING FROM 177-203/84-92, HR 60-82  MD NOTIFIED AND GAVE ORDERS TO SEND TO ED. PT LEFT VIA W/C WITH INFUSION NURSE ON WAY TO ED.            Headache risk factors:   H/o TBI - [facility] (2013) + neck sprain   H/o Meningitis - no   F/h Aneurysms - no       Review of Systems   Constitutional: Negative.   HENT: Negative.   Eyes: Negative.   Respiratory: Negative.   Cardiovascular: Negative.   Gastrointestinal: Negative.   Endocrine: Negative.   Genitourinary: Negative.   Musculoskeletal: Negative.    Skin: Negative.   Allergic/Immunologic: Negative.   Hematological: Negative.   Psychiatric/Behavioral: insomnia      Objective:     Physical Exam   Constitutional: She is oriented to person, place, and time. She appears well-developed.   obesity   Psychiatric: She has a normal mood and flat affect. Her behavior is normal. Judgment and thought content normal.   Headache Exam:  Optic disc is flat OU   Temples appear symmetric  No V1,V2,V3 distribution allodynia/hyperalgesia catalina   No facial swelling  No gross TMJ abnormalities   No ptosis   No conj injection   No meningismus   No neck stiffness  No C spine tenderness  Cervical facet tenderness on palpation catalina   No tender points over trapezium   catalina supraorbital nerve exit point tenderness  catalina occipital nerve exit point tenderness  No auriculotemporal nerve tenderness   Tenderness of levator scapula catalina      Gait - wide based gait   Tremor in hands, legs, voice and neck                                Assessment:        1.  Occipital neuralgia        2.  Cervicalgia        3.  4  5  6 Chronic migraine without aura, with intractable migraine, so stated, with status migrainosus (post traumatic) post concussive?  Depression   TMJ - R sided   Insomnia - SHIRA      Head CT  Findings: There is no evidence of acute or recent major vascular territory cerebral infarction, parenchymal hemorrhage, or intra-axial mass.  There are no extra-axial masses or abnormal fluid collections.  The ventricular system is within normal limits of size for age and shows no distortion by mass-effect or evidence of hydrocephalus.  There is no fracture or destructive osseous lesion.  The extracranial soft tissues are unremarkable.  The visualized paranasal sinuses and mastoid air cells are clear.   Impression    No acute intracranial abnormality.  ______________________________________     Electronically signed by resident: KRISSY MAYERS MD  Date: 03/14/16  Time: 17:09                                                                        Plan:        1. Prophylactic medication -   Despiramine 25 mg AM (for dizziness)   Cymbalta 120 mg daily   Aricept 20 mg daily   Neurontin 900 mg TID    Magnesium 200 mg daily  Topamax 200 mg BID   Cont B2, B6 supplements   2, Breakthrough headache - zanaflex 8 mg Q8  PRN percocet Q=30  Multiple alternative treatment options were offered to the patient   3. Refrain from over counter pain medications. Discussed medication overuse headache.cont Magnesium 400 mg PO BID   4. Occipital neuralgia - If medical management is ineffective may consider occipital nerve blocks.   5. I again urged the patient to keep a headache calender.    f/up Dr. Rock for TBI    B12 injection - 5/25/17, 12/28/18, 5/23/19  Vit D supplements    f/up sleep clinic - CPAP pending      Reviewed labs today      Cont AJOVY (April 2019- ) No side effects        ONB 2 weeks prior to next BOTOX     BOTOX was performed as an indicated therapy for intractable chronic migraine headaches given that the patient failed > 5 prophylactic medications  Botulinum Toxin Injection Procedure   Pre-operative diagnosis: Chronic migraine   Post-operative diagnosis: Same   Procedure: Chemical neurolysis   After risks and benefits were explained including bleeding, infection, worsening of pain, damage to the areas being injected, weakness of muscles, loss of muscle control, dysphagia if injecting the head or neck, facial droop if injecting the facial area, painful injection, allergic or other reaction to the medications being injected, and the failure of the procedure to help the problem, a signed consent was obtained.   The patient was placed in a comfortable area and the sites to be treated were identified.The area to be treated was prepped three times with alcohol and the alcohol allowed to dry. Next, a 30 gauge needle was used to inject the medication in the area to be treated.   Area(s) injected:   Total Botox used: 175  Units   Botox wastage: 25 Units   Injection sites:    muscle bilaterally ( a total of 10 units divided into 2 sites)   Procerus muscle (5 units)   Frontalis muscle bilaterally (a total of 20 units divided into 4 sites)   Temporalis muscle bilaterally (a total of 50 units divided into 8 sites) + 2 sites   Occipitalis muscle bilaterally (a total of 30 units divided into 6 sites)   Cervical paraspinal muscles (a total of 20 units divided into 4 sites)   Trapezius muscle bilaterally (a total of 30 units divided into 6 sites)   Masseter 5 units catalina      Complications: none       RTC for the next Botox injection: 10 weeks

## 2019-08-02 ENCOUNTER — HOSPITAL ENCOUNTER (OUTPATIENT)
Facility: OTHER | Age: 54
Discharge: HOME | End: 2019-08-02
Attending: PLASTIC SURGERY | Admitting: PLASTIC SURGERY
Payer: MEDICARE

## 2019-08-02 ENCOUNTER — ANESTHESIA (OUTPATIENT)
Dept: SURGERY | Facility: OTHER | Age: 54
End: 2019-08-02
Payer: MEDICARE

## 2019-08-02 VITALS
OXYGEN SATURATION: 98 % | DIASTOLIC BLOOD PRESSURE: 71 MMHG | TEMPERATURE: 98 F | SYSTOLIC BLOOD PRESSURE: 141 MMHG | HEIGHT: 64 IN | RESPIRATION RATE: 16 BRPM | BODY MASS INDEX: 49.31 KG/M2 | WEIGHT: 288.81 LBS | HEART RATE: 61 BPM

## 2019-08-02 DIAGNOSIS — M65.332 TRIGGER MIDDLE FINGER OF LEFT HAND: Primary | ICD-10-CM

## 2019-08-02 PROCEDURE — 36000707: Performed by: PLASTIC SURGERY

## 2019-08-02 PROCEDURE — 71000016 HC POSTOP RECOV ADDL HR: Performed by: PLASTIC SURGERY

## 2019-08-02 PROCEDURE — 71000015 HC POSTOP RECOV 1ST HR: Performed by: PLASTIC SURGERY

## 2019-08-02 PROCEDURE — 25000003 PHARM REV CODE 250: Performed by: PLASTIC SURGERY

## 2019-08-02 PROCEDURE — 63600175 PHARM REV CODE 636 W HCPCS: Performed by: ANESTHESIOLOGY

## 2019-08-02 PROCEDURE — 36000706: Performed by: PLASTIC SURGERY

## 2019-08-02 PROCEDURE — 37000008 HC ANESTHESIA 1ST 15 MINUTES: Performed by: PLASTIC SURGERY

## 2019-08-02 PROCEDURE — 63600175 PHARM REV CODE 636 W HCPCS: Performed by: NURSE ANESTHETIST, CERTIFIED REGISTERED

## 2019-08-02 PROCEDURE — 26055 PR INCISE FINGER TENDON SHEATH: ICD-10-PCS | Mod: F2,,, | Performed by: PLASTIC SURGERY

## 2019-08-02 PROCEDURE — 26055 INCISE FINGER TENDON SHEATH: CPT | Mod: F2,,, | Performed by: PLASTIC SURGERY

## 2019-08-02 PROCEDURE — 37000009 HC ANESTHESIA EA ADD 15 MINS: Performed by: PLASTIC SURGERY

## 2019-08-02 RX ORDER — SODIUM CHLORIDE, SODIUM LACTATE, POTASSIUM CHLORIDE, CALCIUM CHLORIDE 600; 310; 30; 20 MG/100ML; MG/100ML; MG/100ML; MG/100ML
INJECTION, SOLUTION INTRAVENOUS CONTINUOUS
Status: DISCONTINUED | OUTPATIENT
Start: 2019-08-02 | End: 2019-08-02 | Stop reason: HOSPADM

## 2019-08-02 RX ORDER — SODIUM CHLORIDE 0.9 % (FLUSH) 0.9 %
10 SYRINGE (ML) INJECTION
Status: DISCONTINUED | OUTPATIENT
Start: 2019-08-02 | End: 2019-08-02 | Stop reason: HOSPADM

## 2019-08-02 RX ORDER — MUPIROCIN 20 MG/G
1 OINTMENT TOPICAL 2 TIMES DAILY
Status: DISCONTINUED | OUTPATIENT
Start: 2019-08-02 | End: 2019-08-02 | Stop reason: HOSPADM

## 2019-08-02 RX ORDER — PROPOFOL 10 MG/ML
VIAL (ML) INTRAVENOUS CONTINUOUS PRN
Status: DISCONTINUED | OUTPATIENT
Start: 2019-08-02 | End: 2019-08-02

## 2019-08-02 RX ORDER — HYDROCODONE BITARTRATE AND ACETAMINOPHEN 5; 325 MG/1; MG/1
1 TABLET ORAL EVERY 4 HOURS PRN
Status: DISCONTINUED | OUTPATIENT
Start: 2019-08-02 | End: 2019-08-02 | Stop reason: HOSPADM

## 2019-08-02 RX ORDER — LIDOCAINE HYDROCHLORIDE AND EPINEPHRINE 10; 10 MG/ML; UG/ML
INJECTION, SOLUTION INFILTRATION; PERINEURAL
Status: DISCONTINUED | OUTPATIENT
Start: 2019-08-02 | End: 2019-08-02 | Stop reason: HOSPADM

## 2019-08-02 RX ORDER — SODIUM CHLORIDE 9 MG/ML
INJECTION, SOLUTION INTRAVENOUS CONTINUOUS
Status: DISCONTINUED | OUTPATIENT
Start: 2019-08-02 | End: 2019-08-02 | Stop reason: HOSPADM

## 2019-08-02 RX ORDER — FENTANYL CITRATE 50 UG/ML
INJECTION, SOLUTION INTRAMUSCULAR; INTRAVENOUS
Status: DISCONTINUED | OUTPATIENT
Start: 2019-08-02 | End: 2019-08-02

## 2019-08-02 RX ORDER — BUPIVACAINE HYDROCHLORIDE 2.5 MG/ML
INJECTION, SOLUTION EPIDURAL; INFILTRATION; INTRACAUDAL
Status: DISCONTINUED | OUTPATIENT
Start: 2019-08-02 | End: 2019-08-02 | Stop reason: HOSPADM

## 2019-08-02 RX ORDER — HYDROCODONE BITARTRATE AND ACETAMINOPHEN 5; 325 MG/1; MG/1
1 TABLET ORAL EVERY 6 HOURS PRN
Qty: 20 TABLET | Refills: 0 | Status: SHIPPED | OUTPATIENT
Start: 2019-08-02 | End: 2019-12-24

## 2019-08-02 RX ORDER — LIDOCAINE HCL/PF 100 MG/5ML
SYRINGE (ML) INTRAVENOUS
Status: DISCONTINUED | OUTPATIENT
Start: 2019-08-02 | End: 2019-08-02

## 2019-08-02 RX ADMIN — LIDOCAINE HYDROCHLORIDE 50 MG: 20 INJECTION, SOLUTION INTRAVENOUS at 09:08

## 2019-08-02 RX ADMIN — SODIUM CHLORIDE, SODIUM LACTATE, POTASSIUM CHLORIDE, AND CALCIUM CHLORIDE: 600; 310; 30; 20 INJECTION, SOLUTION INTRAVENOUS at 09:08

## 2019-08-02 RX ADMIN — PROPOFOL 75 MCG/KG/MIN: 10 INJECTION, EMULSION INTRAVENOUS at 09:08

## 2019-08-02 RX ADMIN — FENTANYL CITRATE 50 MCG: 50 INJECTION, SOLUTION INTRAMUSCULAR; INTRAVENOUS at 09:08

## 2019-08-02 NOTE — ANESTHESIA POSTPROCEDURE EVALUATION
Anesthesia Post Evaluation    Patient: Amna Chawla    Procedure(s) Performed: Procedure(s) (LRB):  RELEASE, TRIGGER FINGER left middle (Left)    Final Anesthesia Type: general  Patient location during evaluation: OPS  Patient participation: Yes- Able to Participate  Level of consciousness: awake and alert and oriented  Post-procedure vital signs: reviewed and stable  Pain management: adequate  Airway patency: patent  PONV status at discharge: No PONV  Anesthetic complications: no      Cardiovascular status: stable  Respiratory status: unassisted, spontaneous ventilation and room air  Hydration status: euvolemic  Follow-up not needed.          Vitals Value Taken Time   /61 8/2/2019  8:23 AM   Temp 36.8 °C (98.3 °F) 8/2/2019  8:23 AM   Pulse 61 8/2/2019  8:23 AM   Resp 16 8/2/2019  8:23 AM   SpO2 98 % 8/2/2019  8:23 AM         No case tracking events are documented in the log.      Pain/Kofi Score: No data recorded

## 2019-08-02 NOTE — BRIEF OP NOTE
[facility]  Brief Operative Note     SUMMARY     Surgery Date: 8/2/2019     Surgeon(s) and Role:     * Matt Pugh Jr., MD - Primary    Assisting Surgeon: None    Pre-op Diagnosis:  Trigger middle finger of left hand [M65.332]    Post-op Diagnosis:  Post-Op Diagnosis Codes:     * Trigger middle finger of left hand [M65.332]    Procedure(s) (LRB):  RELEASE, TRIGGER FINGER left middle (Left)    Anesthesia: Local MAC    Description of the findings of the procedure: left middle finger trigger digit    Findings/Key Components: same    Estimated Blood Loss: * No values recorded between 8/2/2019  9:24 AM and 8/2/2019  9:41 AM *         Specimens:   Specimen (12h ago, onward)    None          Discharge Note    SUMMARY     Admit Date: 8/2/2019    Discharge Date and Time:  08/02/2019 9:42 AM    Hospital Course (synopsis of major diagnoses, care, treatment, and services provided during the course of the hospital stay): pt admitted for outpatient surgery     Final Diagnosis: Post-Op Diagnosis Codes:     * Trigger middle finger of left hand [M65.332]    Disposition: Home or Self Care    Follow Up/Patient Instructions: Follow up in hand clinic in 2 weeks    Medications:  Reconciled Home Medications:      Medication List      START taking these medications    HYDROcodone-acetaminophen 5-325 mg per tablet  Commonly known as:  NORCO  Take 1 tablet by mouth every 6 (six) hours as needed for Pain.        CHANGE how you take these medications    baclofen 20 MG tablet  Commonly known as:  LIORESAL  Take 1 tablet (20 mg total) by mouth 3 (three) times daily.  What changed:    · when to take this  · reasons to take this     DULoxetine 60 MG capsule  Commonly known as:  CYMBALTA  Take 1 capsule (60 mg total) by mouth 2 (two) times daily.  What changed:    · when to take this  · additional instructions     magnesium 200 mg Tab  Take 200 mg by mouth once daily.  What changed:  when to take this        CONTINUE  taking these medications    AJOVY 225 mg/1.5 mL injection  Generic drug:  fremanezumab-vfrm  Inject 225 mg into the skin every 28 days.  (Inject 225 mg into the skin every 28 days.)     ARIPiprazole 10 MG Tab  Commonly known as:  ABILIFY  TAKE 1 TABLET(5 MG) BY MOUTH EVERY EVENING     blood sugar diagnostic Strp  1 strip by Misc.(Non-Drug; Combo Route) route 2 (two) times daily.     * blood-glucose meter kit  Please provide with insurance covered meter     * TRUE METRIX GLUCOSE METER Misc  Generic drug:  blood-glucose meter  TEST BG BID     carvedilol 25 MG tablet  Commonly known as:  COREG  TAKE 1 TABLET(25 MG) BY MOUTH TWICE DAILY WITH MEALS     clonazePAM 1 MG tablet  Commonly known as:  KLONOPIN  Take 1 tablet (1 mg total) by mouth daily as needed for Anxiety.     clopidogrel 75 mg tablet  Commonly known as:  PLAVIX  TAKE 1 TABLET BY MOUTH ONCE DAILY     donepezil 10 MG tablet  Commonly known as:  ARICEPT  Take 1 tablet (10 mg total) by mouth 2 (two) times daily.     gabapentin 300 MG capsule  Commonly known as:  NEURONTIN  Take 3 capsules (900 mg total) by mouth 3 (three) times daily.     * hydrOXYzine HCl 10 MG Tab  Commonly known as:  ATARAX  Take 1 tablet (10 mg total) by mouth 3 (three) times daily as needed (anxiety).     * hydrOXYzine 50 MG tablet  Commonly known as:  ATARAX  Take 1 tablet (50 mg total) by mouth nightly as needed.     lancets Misc  1 Device by Misc.(Non-Drug; Combo Route) route 2 (two) times daily.     LORazepam 0.5 MG tablet  Commonly known as:  ATIVAN  Take 1 tablet (0.5 mg total) by mouth every 6 (six) hours as needed for Anxiety.     losartan 100 MG tablet  Commonly known as:  COZAAR  Take 1 tablet (100 mg total) by mouth once daily.     ondansetron 4 MG Tbdl  Commonly known as:  ZOFRAN-ODT  Take 1 tablet (4 mg total) by mouth every 12 (twelve) hours as needed (nausea).     pantoprazole 40 MG tablet  Commonly known as:  PROTONIX  Take 1 tablet (40 mg total) by mouth once daily.      rivaroxaban 20 mg Tab  Commonly known as:  XARELTO  Take 1 tablet (20 mg total) by mouth daily with dinner or evening meal.     rosuvastatin 40 MG Tab  Commonly known as:  CRESTOR  TAKE 1 TABLET(40 MG) BY MOUTH EVERY EVENING     silver sulfADIAZINE 1% 1 % cream  Commonly known as:  SILVADENE  Apply topically 2 (two) times daily.     tiaGABine 4 MG tablet  Commonly known as:  GABITRIL  Take 1 tablet (4 mg total) by mouth every evening.     tiZANidine 4 MG tablet  Commonly known as:  ZANAFLEX  Take 1 tablet (4 mg total) by mouth every 6 (six) hours as needed.         * This list has 4 medication(s) that are the same as other medications prescribed for you. Read the directions carefully, and ask your doctor or other care provider to review them with you.              Discharge Procedure Orders   Diet general     Call MD for:  temperature >100.4     Call MD for:  persistent nausea and vomiting     Call MD for:  severe uncontrolled pain     Call MD for:  difficulty breathing, headache or visual disturbances     Call MD for:  redness, tenderness, or signs of infection (pain, swelling, redness, odor or green/yellow discharge around incision site)     Call MD for:  hives     Call MD for:  persistent dizziness or light-headedness     Call MD for:  extreme fatigue     Remove dressing in 72 hours     Follow-up Information     Matt Pugh Jr, MD In 2 weeks.    Specialties:  Plastic Surgery, Hand Surgery  Why:  For suture removal  Contact information:  [id number]  [facility] [address]  [phone]

## 2019-08-02 NOTE — OP NOTE
[facility]  Plastic Surgery  Operative Note    SUMMARY     Date of Procedure: 8/2/2019     Procedure: Procedure(s) (LRB):  RELEASE, TRIGGER FINGER left middle (Left)     Surgeon(s) and Role:     * Matt Pugh Jr., MD - Primary    Assisting Surgeon: None    Pre-Operative Diagnosis: Trigger middle finger of left hand [M65.332]    Post-Operative Diagnosis: same    Anesthesia: Local MAC    Technical Procedures Used: open release of left middle finger A1 pulley  Description of the Findings of the Procedure: left middle finger trigger digit    Significant Surgical Tasks Conducted by the Assistant(s), if Applicable: none    Complications: No    Estimated Blood Loss (EBL): * No values recorded between 8/2/2019  9:24 AM and 8/2/2019  9:41 AM *           Implants: * No implants in log *    Specimens:   Specimen (12h ago, onward)    None                  Condition: Good    Disposition: PACU - hemodynamically stable.    Attestation: I was present and scrubbed for the entire procedure.     Indications:  Amna Chawla is a 54 y.o. female presented to clinic complaining of left middle finger trigger digit she failed multiple corticosteroid injections into the tendon sheath.  Due to this have recommended an open release of the A1 pulley at the base the middle finger.  We discussed risks benefits alternatives in detail and she wished to proceed informed consent was obtained the patient was then scheduled for the procedure.    Procedure in detail:  After proper identification of Amna Chawla in the preoperative area the patient wheeled to operative room on a hospital stretcher.  Pressure points padded checked this time.  Time-out was called with surgeons nurses and anesthesia agreed upon the patient procedure. A padded 18 in tourniquet was then placed to her left forearm.  She was then induced under MAC sedation.  Once the patient was well sedated 5 mL 1% lidocaine with epinephrine was injected into  the subcutaneous tissue overlying the A1 pulley of the left middle finger.  Next the hand was then prepped draped in usual sterile fashion.  An Esmarch tourniquet was used to exsanguinate the left hand and the tourniquet was inflated to 250 mm mercury.  Longitudinal incision was made through skin dermis overlying the A1 pulley at the base of the middle finger.  This carried down through subcutaneous tissue to the volar aspect of the A1 pulley.  The neurovascular bundles were protected on the radial and ulnar aspects.  The A1 pulley was identified and divided longitudinally using a 15 blade scalpel Littler scissors.  The FDS and FDP tendons were then retracted out of the tendon sheath to assess for any adhesions there were none found.  The tendons were allowed to retract back into their native positions.  The patient was then asked to flex the finger in full flexion extension there was no evidence of active triggering with multiple movements of active flexion and extension. The tourniquet was then released this time and hemostasis was achieved using bipolar cautery.  Wound was irrigated copious amounts normal saline and closed with a 4 O nylon suture in a horizontal interrupted fashion.  Wound was then cleaned and dried 0.25% Marcaine was injected for postoperative anesthesia. Bacitracin with Xeroform was applied followed by dry sterile dressing. This concluded the procedure the patient tolerated the procedure well without any complications at the end the case needle and sponge counts were correct x2 I was present scrubbed during all aspects of procedure. She was then awoke from MAC anesthesia and taken to PACU for recovery.

## 2019-08-02 NOTE — PLAN OF CARE
Amna Chawla has met all discharge criteria from Phase II. Vital Signs are stable, ambulating  without difficulty. Discharge instructions given, patient verbalized understanding. Discharged from facility via wheelchair in stable condition.

## 2019-08-02 NOTE — INTERVAL H&P NOTE
The patient has been examined and the H&P has been reviewed:    I concur with the findings and no changes have occurred since H&P was written.    Anesthesia/Surgery risks, benefits and alternative options discussed and understood by patient/family.          Active Hospital Problems    Diagnosis  POA    Trigger middle finger of left hand [M65.332]  Yes      Resolved Hospital Problems   No resolved problems to display.

## 2019-08-05 ENCOUNTER — TELEPHONE (OUTPATIENT)
Dept: ORTHOPEDICS | Facility: CLINIC | Age: 54
End: 2019-08-05

## 2019-08-05 NOTE — TELEPHONE ENCOUNTER
----- Message from Silvia Arrieta sent at 8/5/2019  8:42 AM CDT -----  Contact: PUSHPA KEY   Name of Who is Calling: PUSHPA KEY       What is the request in detail: Patient is requesting a call back. She would like to know if she is able take her bandage off today and get her hand wet.      Can the clinic reply by [facility]: no      What Number to Call Back if not in [facility]:  [phone]

## 2019-08-05 NOTE — TELEPHONE ENCOUNTER
Spoke with pt informing her to keep dressing on for 1 week and then she may remove dressing.  Pt was advised to keep incision clean and dry until post op appt.

## 2019-08-07 ENCOUNTER — TELEPHONE (OUTPATIENT)
Dept: PHARMACY | Facility: CLINIC | Age: 54
End: 2019-08-07

## 2019-08-08 ENCOUNTER — HOME CARE VISIT (OUTPATIENT)
Dept: NEUROLOGY | Facility: HOSPITAL | Age: 54
End: 2019-08-08

## 2019-08-14 VITALS
SYSTOLIC BLOOD PRESSURE: 134 MMHG | HEART RATE: 76 BPM | RESPIRATION RATE: 20 BRPM | OXYGEN SATURATION: 96 % | DIASTOLIC BLOOD PRESSURE: 88 MMHG

## 2019-08-14 NOTE — PROGRESS NOTES
AAOx3.  Denies discomfort.  Reports intermittent migraines for which she sees Dr. Coughlin and receives botox.  NIHSS-0; does not smoke; refrains from adding salt and salty foods but does have occasional fast food; compliant w/ meds; walks hallways of apartment building for exercise.Education provided on goal of stroke mobile, s/s of stroke, diet, exercise, medications.  Understanding verbalized.  Encouraged to utilize stroke team for any concern.  Max potential reached for stroke recovery.  Discharged from program.

## 2019-08-20 ENCOUNTER — OFFICE VISIT (OUTPATIENT)
Dept: PSYCHIATRY | Facility: CLINIC | Age: 54
End: 2019-08-20
Payer: MEDICARE

## 2019-08-20 VITALS
WEIGHT: 290.88 LBS | HEART RATE: 79 BPM | DIASTOLIC BLOOD PRESSURE: 81 MMHG | SYSTOLIC BLOOD PRESSURE: 134 MMHG | BODY MASS INDEX: 49.93 KG/M2

## 2019-08-20 DIAGNOSIS — F41.9 ANXIETY: ICD-10-CM

## 2019-08-20 DIAGNOSIS — G47.00 INSOMNIA, UNSPECIFIED TYPE: ICD-10-CM

## 2019-08-20 DIAGNOSIS — F33.1 DEPRESSION, MAJOR, RECURRENT, MODERATE: Primary | ICD-10-CM

## 2019-08-20 PROCEDURE — 99213 OFFICE O/P EST LOW 20 MIN: CPT | Mod: PBBFAC | Performed by: NURSE PRACTITIONER

## 2019-08-20 PROCEDURE — 99999 PR PBB SHADOW E&M-EST. PATIENT-LVL III: ICD-10-PCS | Mod: PBBFAC,,, | Performed by: NURSE PRACTITIONER

## 2019-08-20 PROCEDURE — 99999 PR PBB SHADOW E&M-EST. PATIENT-LVL III: CPT | Mod: PBBFAC,,, | Performed by: NURSE PRACTITIONER

## 2019-08-20 PROCEDURE — 99213 PR OFFICE/OUTPT VISIT, EST, LEVL III, 20-29 MIN: ICD-10-PCS | Mod: S$PBB,,, | Performed by: NURSE PRACTITIONER

## 2019-08-20 PROCEDURE — 90833 PSYTX W PT W E/M 30 MIN: CPT | Mod: ,,, | Performed by: NURSE PRACTITIONER

## 2019-08-20 PROCEDURE — 90833 PR PSYCHOTHERAPY W/PATIENT W/E&M, 30 MIN (ADD ON): ICD-10-PCS | Mod: ,,, | Performed by: NURSE PRACTITIONER

## 2019-08-20 PROCEDURE — 99213 OFFICE O/P EST LOW 20 MIN: CPT | Mod: S$PBB,,, | Performed by: NURSE PRACTITIONER

## 2019-08-20 RX ORDER — HYDROXYZINE HYDROCHLORIDE 50 MG/1
50 TABLET, FILM COATED ORAL NIGHTLY PRN
Qty: 30 TABLET | Refills: 11 | Status: SHIPPED | OUTPATIENT
Start: 2019-08-20 | End: 2019-12-24

## 2019-08-20 RX ORDER — DULOXETIN HYDROCHLORIDE 60 MG/1
60 CAPSULE, DELAYED RELEASE ORAL 2 TIMES DAILY
Qty: 60 CAPSULE | Refills: 11 | Status: SHIPPED | OUTPATIENT
Start: 2019-08-20 | End: 2020-04-30 | Stop reason: SDUPTHER

## 2019-08-20 RX ORDER — HYDROXYZINE HYDROCHLORIDE 10 MG/1
10 TABLET, FILM COATED ORAL 3 TIMES DAILY PRN
Qty: 90 TABLET | Refills: 11 | Status: SHIPPED | OUTPATIENT
Start: 2019-08-20 | End: 2019-12-24

## 2019-08-20 RX ORDER — ARIPIPRAZOLE 15 MG/1
15 TABLET ORAL DAILY
Qty: 90 TABLET | Refills: 1 | Status: SHIPPED | OUTPATIENT
Start: 2019-08-20 | End: 2020-04-30 | Stop reason: SDUPTHER

## 2019-08-20 RX ORDER — TRAZODONE HYDROCHLORIDE 100 MG/1
TABLET ORAL
Qty: 45 TABLET | Refills: 11 | Status: SHIPPED | OUTPATIENT
Start: 2019-08-20 | End: 2020-10-16

## 2019-08-20 RX ORDER — CLONAZEPAM 1 MG/1
1 TABLET ORAL DAILY PRN
Qty: 30 TABLET | Refills: 5 | Status: SHIPPED | OUTPATIENT
Start: 2019-08-20 | End: 2020-03-26 | Stop reason: SDUPTHER

## 2019-08-20 NOTE — PROGRESS NOTES
"Outpatient Psychiatry Follow-Up Visit (MD/NP)    8/20/2019    Clinical Status of Patient:  Outpatient (Ambulatory)    Chief Complaint:  Amna Chawla is a 54 y.o. female who presents today for follow-up of depression and anxiety.  Met with patient.      Last Visit:  was on 4/04/19. Chart and  reviewed.     Interval History and Content of Current Session:  Current Medication Profile for Psych  · Continue Cymbalta 60 mg po BID  · Increase to Abilify 10 mg po daily  · Continue Ambien 10 mg po q hs PRN insomnia  · Continue  Atarx (hydroxyzine) 50 mg po q hs PRN insomnia and start Atarax 10 mg po TID PRN anxiety  · Continue Klonopin 1 mg po daily PRN severe anxiety  · Also taking Neurontin and Topamax from Neurology    Pt states she is not sleeping well.  Appears that her pharmacy gave her Zaleplon instead of Ambien due to insurance coverage. Will try Trazadone.  Encouraged pt to restart therapy with . Pt appears that she only attended initial session. Continues to report mood swings. Reports that Abilify is effective but continues to struggle with "going off on people". Compliant with medications and reports effectiveness and denies side effects; no EPS/TD; completed AIMS scale.   Denies SI/HI/AVH.   Will increase to Abilify 15 mg po daily.      Psychotherapy:  · Target symptoms: depression, anxiety   · Why chosen therapy is appropriate versus another modality: relevant to diagnosis  · Outcome monitoring methods: self-report, observation  · Therapeutic intervention type: insight oriented psychotherapy, CBT  · Topics discussed/themes: relationships difficulties, parenting issues, stress related to medical comorbidities, difficulty managing affect in interpersonal relationships, building skills sets for symptom management, symptom recognition  · The patient's response to the intervention is accepting. The patient's progress toward treatment goals is fair.   · Duration of intervention: 19 " minutes.    Review of Systems   · PSYCHIATRIC: Pertinant items are noted in the narrative.  · CONSTITUTIONAL: No weight gain or loss.   · ENDOCRINE: No polydipsia or polyuria.  · INTEGUMENTARY: No rashes or lacerations.  · EYES: No exophthalmos, jaundice or blindness.  · ENT: No dizziness, tinnitus or hearing loss.  · RESPIRATORY: No shortness of breath.  · GASTROINTESTINAL: No nausea, vomiting, pain, constipation or diarrhea.  · GENITOURINARY: No frequency, dysuria or sexual dysfunction.  · HEMATOLOGIC/LYMPHATIC: No excessive bleeding, prolonged or excessive bleeding after dental extraction/injury.  · ALLERGIC/IMMUNOLOGIC: No allergic response to materials, foods or animals at this time.    Past Medical, Family and Social History: The patient's past medical, family and social history have been reviewed and updated as appropriate within the electronic medical record - see encounter notes.    Compliance: yes    Side effects: None    Risk Parameters:  Patient reports no suicidal ideation  Patient reports no homicidal ideation  Patient reports no self-injurious behavior  Patient reports no violent behavior    Exam (detailed: at least 9 elements; comprehensive: all 15 elements)   Constitutional  Vitals:  Most recent vital signs, dated less than 90 days prior to this appointment, were reviewed.   Vitals:    08/20/19 0752   BP: 134/81   Pulse: 79   Weight: 131.9 kg (290 lb 14.4 oz)        General:  unremarkable, age appropriate     Musculoskeletal  Muscle Strength/Tone:  no dystonia, no tremor, no tic   Gait & Station:  non-ataxic     Psychiatric  Speech:  no latency; no press   Mood & Affect:  dysthymic, irritable  irritable   Thought Process:  normal and logical   Associations:  intact   Thought Content:  normal, no suicidality, no homicidality, delusions, or paranoia   Insight:  has awareness of illness   Judgement: behavior is adequate to circumstances, age appropriate   Orientation:  grossly intact, person, place,  situation, time/date   Memory: intact for content of interview, able to remember recent events- yes, able to remember remote events- yes   Language: grossly intact   Attention Span & Concentration:  able to focus   Fund of Knowledge:  intact and appropriate to age and level of education, familiar with aspects of current personal life     Assessment and Diagnosis   Status/Progress: Based on the examination today, the patient's problem(s) is/are treatment resistant.  New problems have not been presented today.   Co-morbidities and Lack of compliance are not complicating management of the primary condition.  There are no active rule-out diagnoses for this patient at this time.     General Impression:       ICD-10-CM ICD-9-CM   1. Depression, major, recurrent, moderate F33.1 296.32   2. Anxiety F41.9 300.00   3. Insomnia, unspecified type G47.00 780.52       Intervention/Counseling/Treatment Plan   · Medication Management: The risks and benefits of medication were discussed with the patient.   · Continue Cymbalta 60 mg po BID  · Increase to Abilify 15 mg po daily  · DC Ambien (not covered by insurance and Zaleplon not effective)  · Start Trazadone 100-150 mg po q hs PRN insomnia  · Continue  Atarx (hydroxyzine) 50 mg po q hs PRN insomnia and start Atarax 10 mg po TID PRN anxiety  · Continue Klonopin 1 mg po daily PRN severe anxiety  · Continue individual therapy with Dr. Rosales    Return to Clinic: 6 months     Risks, benefits, side effects and alternative treatments discussed with patient. Patient agrees with the current plan as documented.  Encouraged Patient to keep future appointments.  Take medications as prescribed and abstain from substance abuse.  Pt to present to ED for thoughts to harm herself or others

## 2019-08-21 ENCOUNTER — OFFICE VISIT (OUTPATIENT)
Dept: ORTHOPEDICS | Facility: CLINIC | Age: 54
End: 2019-08-21
Payer: MEDICARE

## 2019-08-21 VITALS
HEART RATE: 87 BPM | BODY MASS INDEX: 49.51 KG/M2 | HEIGHT: 64 IN | DIASTOLIC BLOOD PRESSURE: 78 MMHG | WEIGHT: 290 LBS | SYSTOLIC BLOOD PRESSURE: 134 MMHG

## 2019-08-21 DIAGNOSIS — M25.642 DECREASED RANGE OF MOTION OF FINGER OF LEFT HAND: ICD-10-CM

## 2019-08-21 DIAGNOSIS — Z98.890 STATUS POST SURGERY: Primary | ICD-10-CM

## 2019-08-21 PROCEDURE — 99215 OFFICE O/P EST HI 40 MIN: CPT | Mod: PBBFAC | Performed by: PLASTIC SURGERY

## 2019-08-21 PROCEDURE — 99024 POSTOP FOLLOW-UP VISIT: CPT | Mod: POP,,, | Performed by: PLASTIC SURGERY

## 2019-08-21 PROCEDURE — 99024 PR POST-OP FOLLOW-UP VISIT: ICD-10-PCS | Mod: POP,,, | Performed by: PLASTIC SURGERY

## 2019-08-21 PROCEDURE — 99999 PR PBB SHADOW E&M-EST. PATIENT-LVL V: ICD-10-PCS | Mod: PBBFAC,,, | Performed by: PLASTIC SURGERY

## 2019-08-21 PROCEDURE — 99999 PR PBB SHADOW E&M-EST. PATIENT-LVL V: CPT | Mod: PBBFAC,,, | Performed by: PLASTIC SURGERY

## 2019-08-21 NOTE — PROGRESS NOTES
"Ms. Chawla is here today for a post-operative visit.she is 13 days status post left middle finger trigger release. she reports that she is doing well, however she still has swelling and decreased range of motion of the middle finger.  Pain is minimal.  she is not taking pain medication  she denies fever, chills, and sweats since the time of the surgery.     Physical exam:    Vitals:    08/21/19 0821   BP: 134/78   Pulse: 87   Weight: 131.5 kg (290 lb)   Height: 5' 4" (1.626 m)   PainSc: 0-No pain     Sutures in place  Incision is clean, dry and intact.  There is no erythema or exudate.  There is no sign of any infection. she is NVI.  Moderate swelling of the digit decreased active range of motion with flexion and extension.    Assessment:  Status post surgry  Plan:  Amna was seen today for follow-up.    Diagnoses and all orders for this visit:    Status post surgery  -     Ambulatory referral to Occupational Therapy    Decreased range of motion of finger of left hand  -     Ambulatory referral to Occupational Therapy        -sutures removed today in clinic, may get it wet in the shower and use regular soap  - recommended that she continue with active and passive range of motion exercises   - Tylenol 500 mg and/or Ibuprofen 400mg every 4-6 hours with food for pain as tolerated  - Therapy recommended:  Occupational therapy  - Immobilization:  None  - Follow up:  P.r.n.    "

## 2019-08-31 ENCOUNTER — EXTERNAL CHRONIC CARE MANAGEMENT (OUTPATIENT)
Dept: PRIMARY CARE CLINIC | Facility: CLINIC | Age: 54
End: 2019-08-31
Payer: MEDICARE

## 2019-08-31 PROCEDURE — 99490 PR CHRONIC CARE MGMT, 1ST 20 MIN: ICD-10-PCS | Mod: S$PBB,,, | Performed by: PSYCHIATRY & NEUROLOGY

## 2019-08-31 PROCEDURE — 99490 CHRNC CARE MGMT STAFF 1ST 20: CPT | Mod: PBBFAC | Performed by: PSYCHIATRY & NEUROLOGY

## 2019-08-31 PROCEDURE — 99490 CHRNC CARE MGMT STAFF 1ST 20: CPT | Mod: S$PBB,,, | Performed by: PSYCHIATRY & NEUROLOGY

## 2019-09-05 ENCOUNTER — TELEPHONE (OUTPATIENT)
Dept: ORTHOPEDICS | Facility: CLINIC | Age: 54
End: 2019-09-05

## 2019-09-05 NOTE — TELEPHONE ENCOUNTER
----- Message from Vicenta Stringer sent at 9/5/2019  1:25 PM CDT -----  Contact: self   Name of Who is Calling: PUSHPA KEY [[id number]]      What is the request in detail: Pt states that she had surgery 3 weeks ago for trigger finger on her left hand. Pt states that the trigger and middle finger are now swollen. Additional, pt states that the middle finger on her right hand is swollen also. Please contact to further discuss and advise.    Can the clinic reply by [facility]: n      What Number to Call Back if not in [facility]: [phone]

## 2019-09-05 NOTE — TELEPHONE ENCOUNTER
Patient scheduled for an appointment on 9/11/19. Patient also stated that she was never contacted by therapy. Provided the patient with therapy's phone number and advised her to try OTC pain medication to alleviate any discomfort she may be experiencing.     Patient verbalized understanding.

## 2019-09-10 LAB — HPV MRNA E6/E7: NOT DETECTED

## 2019-09-11 ENCOUNTER — OFFICE VISIT (OUTPATIENT)
Dept: ORTHOPEDICS | Facility: CLINIC | Age: 54
End: 2019-09-11
Payer: MEDICARE

## 2019-09-11 VITALS
HEART RATE: 79 BPM | HEIGHT: 64 IN | BODY MASS INDEX: 49.51 KG/M2 | SYSTOLIC BLOOD PRESSURE: 159 MMHG | DIASTOLIC BLOOD PRESSURE: 91 MMHG | WEIGHT: 290 LBS

## 2019-09-11 DIAGNOSIS — M65.331 ACQUIRED TRIGGER FINGER OF RIGHT MIDDLE FINGER: ICD-10-CM

## 2019-09-11 DIAGNOSIS — M25.642 DECREASED RANGE OF MOTION OF FINGER OF LEFT HAND: Primary | ICD-10-CM

## 2019-09-11 PROCEDURE — 99999 PR PBB SHADOW E&M-EST. PATIENT-LVL IV: CPT | Mod: PBBFAC,,, | Performed by: PLASTIC SURGERY

## 2019-09-11 PROCEDURE — 99213 OFFICE O/P EST LOW 20 MIN: CPT | Mod: 24,25,S$PBB, | Performed by: PLASTIC SURGERY

## 2019-09-11 PROCEDURE — 20550 NJX 1 TENDON SHEATH/LIGAMENT: CPT | Mod: 79,F7,S$PBB, | Performed by: PLASTIC SURGERY

## 2019-09-11 PROCEDURE — 99214 OFFICE O/P EST MOD 30 MIN: CPT | Mod: PBBFAC,25 | Performed by: PLASTIC SURGERY

## 2019-09-11 PROCEDURE — 99213 PR OFFICE/OUTPT VISIT, EST, LEVL III, 20-29 MIN: ICD-10-PCS | Mod: 24,25,S$PBB, | Performed by: PLASTIC SURGERY

## 2019-09-11 PROCEDURE — 99999 PR PBB SHADOW E&M-EST. PATIENT-LVL IV: ICD-10-PCS | Mod: PBBFAC,,, | Performed by: PLASTIC SURGERY

## 2019-09-11 PROCEDURE — 20550 NJX 1 TENDON SHEATH/LIGAMENT: CPT | Mod: PBBFAC | Performed by: PLASTIC SURGERY

## 2019-09-11 PROCEDURE — 20550 PR INJECT TENDON SHEATH/LIGAMENT: ICD-10-PCS | Mod: 79,F7,S$PBB, | Performed by: PLASTIC SURGERY

## 2019-09-11 RX ORDER — TRIAMCINOLONE ACETONIDE 40 MG/ML
40 INJECTION, SUSPENSION INTRA-ARTICULAR; INTRAMUSCULAR
Status: COMPLETED | OUTPATIENT
Start: 2019-09-11 | End: 2019-09-11

## 2019-09-11 RX ADMIN — TRIAMCINOLONE ACETONIDE 40 MG: 40 INJECTION, SUSPENSION INTRA-ARTICULAR; INTRAMUSCULAR at 09:09

## 2019-09-11 NOTE — PROGRESS NOTES
Subjective:      Patient ID: Amna Chawla is a 54 y.o. female.    Chief Complaint: Pain of the Left Hand    Amna Chawla returns to clinic today complaining of a painful left middle finger with occasional triggering.  She also complains of stiffness and swelling of both the fingers.  She states that the incision at the base of the left middle finger is quite sore also she denies any active triggering within left hand.  She has not been occupational therapy due to lack of an appointment.      Review of Systems   Musculoskeletal: Positive for stiffness.   Neurological: Negative for numbness.   All other systems reviewed and are negative.        Objective:            General    Constitutional: She is oriented to person, place, and time. She appears well-nourished. No distress.   Pulmonary/Chest: Effort normal.   Neurological: She is alert and oriented to person, place, and time.   Psychiatric: She has a normal mood and affect. Her behavior is normal.             Right Hand/Wrist Exam     Tenderness   The patient is tender to palpation of the benson area.    Tests   Flexor Digitorum Superficialis Test: normal  Flexor Digitorum Profundus Test: normal      Other     Neuorologic Exam    Median Distribution: normal  Ulnar Distribution: normal  Radial Distribution: normal    Comments:  Tenderness to compression over the A1 pulley at the base of the middle finger      Left Hand/Wrist Exam     Other     Sensory Exam  Median Distribution: normal  Ulnar Distribution: normal  Radial Distribution: normal    Comments:  Well-healed incision at the base of the middle finger          Vascular Exam       Capillary Refill  Left Hand: normal capillary refill      PROCEDURE:  I have explained the risks, benefits, and alternatives of the procedure in detail.  The patient voices understanding and all questions have been answered. So after I performed a sterile prep of the skin, the level of there A-1 pulley of the right long finger is  injected using a 25 gauge needle from the volar approach with a  combination of 1cc 1% plain Lidocaine and 40 mg of Kenalog.  The patient is cautioned and immediate relief of pain is secondary to the local anesthetic and will be temporary.  After the anesthetic wears off there may be a increase in pain that may last for a few hours or a few days and they should use ice to help alleviate this flair up of pain.             Assessment:       Encounter Diagnoses   Name Primary?    Decreased range of motion of finger of left hand Yes    Acquired trigger finger of right middle finger           Plan:       Amna was seen today for pain.    Diagnoses and all orders for this visit:    Decreased range of motion of finger of left hand    Acquired trigger finger of right middle finger      She was found to have recurrent trigger digit within the right hand at the middle finger.  She has had 1 previous injection therefore she was offered a repeat injection into the tendon sheath the right middle finger.  She was advised to call occupational therapy office to schedule her 1st appointment for her left hand.  We discussed scar massage and desensitization as well as range of motion exercises.  Discussed that if her symptoms fail improve worsen over the next 8 weeks he may return to clinic otherwise she may follow up p.r.n.

## 2019-09-12 ENCOUNTER — PATIENT MESSAGE (OUTPATIENT)
Dept: NEUROLOGY | Facility: CLINIC | Age: 54
End: 2019-09-12

## 2019-09-17 ENCOUNTER — OFFICE VISIT (OUTPATIENT)
Dept: INTERNAL MEDICINE | Facility: CLINIC | Age: 54
End: 2019-09-17
Payer: MEDICARE

## 2019-09-17 ENCOUNTER — LAB VISIT (OUTPATIENT)
Dept: LAB | Facility: HOSPITAL | Age: 54
End: 2019-09-17
Attending: INTERNAL MEDICINE
Payer: MEDICARE

## 2019-09-17 ENCOUNTER — PATIENT MESSAGE (OUTPATIENT)
Dept: INTERNAL MEDICINE | Facility: CLINIC | Age: 54
End: 2019-09-17

## 2019-09-17 VITALS
BODY MASS INDEX: 50.02 KG/M2 | HEART RATE: 72 BPM | HEIGHT: 64 IN | SYSTOLIC BLOOD PRESSURE: 118 MMHG | OXYGEN SATURATION: 98 % | WEIGHT: 293 LBS | DIASTOLIC BLOOD PRESSURE: 76 MMHG

## 2019-09-17 DIAGNOSIS — I42.8 NONISCHEMIC CARDIOMYOPATHY: ICD-10-CM

## 2019-09-17 DIAGNOSIS — Z95.0 PACEMAKER: ICD-10-CM

## 2019-09-17 DIAGNOSIS — D51.9 ANEMIA DUE TO VITAMIN B12 DEFICIENCY, UNSPECIFIED B12 DEFICIENCY TYPE: ICD-10-CM

## 2019-09-17 DIAGNOSIS — Z79.01 LONG TERM (CURRENT) USE OF ANTICOAGULANTS: ICD-10-CM

## 2019-09-17 DIAGNOSIS — Z86.74 HISTORY OF SUDDEN CARDIAC ARREST: ICD-10-CM

## 2019-09-17 DIAGNOSIS — I10 ESSENTIAL HYPERTENSION: Chronic | ICD-10-CM

## 2019-09-17 DIAGNOSIS — R73.03 PREDIABETES: ICD-10-CM

## 2019-09-17 DIAGNOSIS — R07.89 CHEST TIGHTNESS: ICD-10-CM

## 2019-09-17 DIAGNOSIS — E55.9 VITAMIN D DEFICIENCY: ICD-10-CM

## 2019-09-17 DIAGNOSIS — Z86.73 HISTORY OF TIA (TRANSIENT ISCHEMIC ATTACK): ICD-10-CM

## 2019-09-17 DIAGNOSIS — F33.1 DEPRESSION, MAJOR, RECURRENT, MODERATE: ICD-10-CM

## 2019-09-17 DIAGNOSIS — E04.1 THYROID NODULE: ICD-10-CM

## 2019-09-17 DIAGNOSIS — Z86.73 HISTORY OF CVA (CEREBROVASCULAR ACCIDENT): ICD-10-CM

## 2019-09-17 DIAGNOSIS — E78.2 MIXED HYPERLIPIDEMIA: ICD-10-CM

## 2019-09-17 DIAGNOSIS — K21.9 GASTROESOPHAGEAL REFLUX DISEASE WITHOUT ESOPHAGITIS: ICD-10-CM

## 2019-09-17 DIAGNOSIS — Z12.31 SCREENING MAMMOGRAM, ENCOUNTER FOR: ICD-10-CM

## 2019-09-17 DIAGNOSIS — F41.9 ANXIETY: ICD-10-CM

## 2019-09-17 DIAGNOSIS — G43.711 CHRONIC MIGRAINE WITHOUT AURA, WITH INTRACTABLE MIGRAINE, SO STATED, WITH STATUS MIGRAINOSUS: ICD-10-CM

## 2019-09-17 DIAGNOSIS — I48.0 PAROXYSMAL ATRIAL FIBRILLATION: ICD-10-CM

## 2019-09-17 DIAGNOSIS — I10 ESSENTIAL HYPERTENSION: Primary | Chronic | ICD-10-CM

## 2019-09-17 DIAGNOSIS — Z95.810 BIVENTRICULAR AUTOMATIC IMPLANTABLE CARDIOVERTER DEFIBRILLATOR IN SITU: ICD-10-CM

## 2019-09-17 PROBLEM — R42 LIGHTHEADEDNESS: Status: RESOLVED | Noted: 2018-08-21 | Resolved: 2019-09-17

## 2019-09-17 LAB
25(OH)D3+25(OH)D2 SERPL-MCNC: 24 NG/ML (ref 30–96)
ALBUMIN SERPL BCP-MCNC: 3.1 G/DL (ref 3.5–5.2)
ALP SERPL-CCNC: 60 U/L (ref 55–135)
ALT SERPL W/O P-5'-P-CCNC: 23 U/L (ref 10–44)
ANION GAP SERPL CALC-SCNC: 5 MMOL/L (ref 8–16)
AST SERPL-CCNC: 10 U/L (ref 10–40)
BASOPHILS # BLD AUTO: 0.01 K/UL (ref 0–0.2)
BASOPHILS NFR BLD: 0.2 % (ref 0–1.9)
BILIRUB SERPL-MCNC: 0.3 MG/DL (ref 0.1–1)
BUN SERPL-MCNC: 17 MG/DL (ref 6–20)
CALCIUM SERPL-MCNC: 9.1 MG/DL (ref 8.7–10.5)
CHLORIDE SERPL-SCNC: 108 MMOL/L (ref 95–110)
CO2 SERPL-SCNC: 24 MMOL/L (ref 23–29)
CREAT SERPL-MCNC: 0.9 MG/DL (ref 0.5–1.4)
DIFFERENTIAL METHOD: ABNORMAL
EOSINOPHIL # BLD AUTO: 0 K/UL (ref 0–0.5)
EOSINOPHIL NFR BLD: 0.2 % (ref 0–8)
ERYTHROCYTE [DISTWIDTH] IN BLOOD BY AUTOMATED COUNT: 16 % (ref 11.5–14.5)
EST. GFR  (AFRICAN AMERICAN): >60 ML/MIN/1.73 M^2
EST. GFR  (NON AFRICAN AMERICAN): >60 ML/MIN/1.73 M^2
ESTIMATED AVG GLUCOSE: 137 MG/DL (ref 68–131)
GLUCOSE SERPL-MCNC: 77 MG/DL (ref 70–110)
HBA1C MFR BLD HPLC: 6.4 % (ref 4–5.6)
HCT VFR BLD AUTO: 33.6 % (ref 37–48.5)
HGB BLD-MCNC: 11.2 G/DL (ref 12–16)
LYMPHOCYTES # BLD AUTO: 1.4 K/UL (ref 1–4.8)
LYMPHOCYTES NFR BLD: 22.8 % (ref 18–48)
MCH RBC QN AUTO: 29.6 PG (ref 27–31)
MCHC RBC AUTO-ENTMCNC: 33.3 G/DL (ref 32–36)
MCV RBC AUTO: 89 FL (ref 82–98)
MONOCYTES # BLD AUTO: 0.8 K/UL (ref 0.3–1)
MONOCYTES NFR BLD: 13.2 % (ref 4–15)
NEUTROPHILS # BLD AUTO: 3.8 K/UL (ref 1.8–7.7)
NEUTROPHILS NFR BLD: 63.6 % (ref 38–73)
PLATELET # BLD AUTO: 221 K/UL (ref 150–350)
PMV BLD AUTO: 10.8 FL (ref 9.2–12.9)
POTASSIUM SERPL-SCNC: 4.1 MMOL/L (ref 3.5–5.1)
PROT SERPL-MCNC: 9.2 G/DL (ref 6–8.4)
RBC # BLD AUTO: 3.78 M/UL (ref 4–5.4)
SODIUM SERPL-SCNC: 137 MMOL/L (ref 136–145)
VIT B12 SERPL-MCNC: 270 PG/ML (ref 210–950)
WBC # BLD AUTO: 5.97 K/UL (ref 3.9–12.7)

## 2019-09-17 PROCEDURE — 83921 ORGANIC ACID SINGLE QUANT: CPT

## 2019-09-17 PROCEDURE — 93005 ELECTROCARDIOGRAM TRACING: CPT | Mod: PBBFAC | Performed by: INTERNAL MEDICINE

## 2019-09-17 PROCEDURE — 93010 EKG 12-LEAD: ICD-10-PCS | Mod: S$PBB,,, | Performed by: INTERNAL MEDICINE

## 2019-09-17 PROCEDURE — 99214 OFFICE O/P EST MOD 30 MIN: CPT | Mod: S$PBB,,, | Performed by: INTERNAL MEDICINE

## 2019-09-17 PROCEDURE — 99999 PR PBB SHADOW E&M-EST. PATIENT-LVL IV: CPT | Mod: PBBFAC,,, | Performed by: INTERNAL MEDICINE

## 2019-09-17 PROCEDURE — 80053 COMPREHEN METABOLIC PANEL: CPT

## 2019-09-17 PROCEDURE — 93010 ELECTROCARDIOGRAM REPORT: CPT | Mod: S$PBB,,, | Performed by: INTERNAL MEDICINE

## 2019-09-17 PROCEDURE — 83036 HEMOGLOBIN GLYCOSYLATED A1C: CPT

## 2019-09-17 PROCEDURE — 85025 COMPLETE CBC W/AUTO DIFF WBC: CPT

## 2019-09-17 PROCEDURE — 99999 PR PBB SHADOW E&M-EST. PATIENT-LVL IV: ICD-10-PCS | Mod: PBBFAC,,, | Performed by: INTERNAL MEDICINE

## 2019-09-17 PROCEDURE — 82607 VITAMIN B-12: CPT

## 2019-09-17 PROCEDURE — 99214 PR OFFICE/OUTPT VISIT, EST, LEVL IV, 30-39 MIN: ICD-10-PCS | Mod: S$PBB,,, | Performed by: INTERNAL MEDICINE

## 2019-09-17 PROCEDURE — 82306 VITAMIN D 25 HYDROXY: CPT

## 2019-09-17 PROCEDURE — 99214 OFFICE O/P EST MOD 30 MIN: CPT | Mod: PBBFAC | Performed by: INTERNAL MEDICINE

## 2019-09-17 NOTE — PROGRESS NOTES
INTERNAL MEDICINE ESTABLISHED PATIENT VISIT NOTE    Subjective:     Chief Complaint: Follow-up  HTN     Patient ID: Amna Chawla is a 54 y.o. female with hx CVA and TIA c residual cognitive deficits (most recently c TIA 9/2018 per pt, has mild B weakness from several strokes in the past), carotid a disease, HTN, paroxysmal A fib c LAE, hx sudden cardiac arrest c VF and no ischemic heart disease and preserved EF (2013), intermittent 3rd deg AV block and symptomatic LVEF of 40% in setting of normal PET stress and chronic RV pacing s/p CRT-D, HLD, thyroid nodule s/p FNA 7/2018 c path c/w benign follicular nodule, depression with anxiety, chronic complex h/a c occipital neuralgia followed by Neuro and on mult meds and has also had tx c botox, GERD c hiatal hernia, B carpal tunnel s/p release B, SHIRA on APAP 6-20cm followed in sleep clinic, insomnia, prediabetes, last seen by me in July for a focused visit for a cough and f/u of burn to her finger, here today for f/u.    States previously noted cough now resolved.    Since last appt, was seen by Hand surgery for L middle finger trigger digit and s/p release done on 8/2/19.  Had f/u on 8/21 and was referred to OT at that time which she has not yet scheduled.    Still seeing Dr. Cortez for botox and seeing psych for depression and anxiety.    Today c/o chest tightness for the past few weeks.  Occurs at rest and c exertion, lasting about 5-7 min.  No alleviating or aggravating factors.  Last episode this past Thursday.  Denies palpitations or ICD shocks.  Has been taking all meds as rx'ed.    Past Medical History:  Past Medical History:   Diagnosis Date    Anticoagulant long-term use     Anxiety     Asthma     Atrial fibrillation     Brain anoxic injury     Cervicalgia 8/28/2014    CHI (closed head injury) 2/19/2013    Convulsion 5/30/2015    Decreased ROM of left shoulder 4/12/2017    Defibrillator activation 2013    Depression     Heart block     History of  sudden cardiac arrest 2/2013    PEA arrest with subsequent long-QT    Hx of psychiatric care     Hypertension     Left atrial enlargement 4/11/2018    Pacemaker 2013    Paresthesia 11/1/2013    Prolonged Q-T interval on ECG 2/8/2013    Psychiatric problem     Seizures     Sleep difficulties     Stroke     weakness lt side    Therapy     Thyroid disease     Upper airway resistance syndrome 2/21/2017       Home Medications:  Prior to Admission medications    Medication Sig Start Date End Date Taking? Authorizing Provider   ARIPiprazole (ABILIFY) 15 MG Tab Take 1 tablet (15 mg total) by mouth once daily. 8/20/19  Yes Kade Huffman III, NP   baclofen (LIORESAL) 20 MG tablet Take 1 tablet (20 mg total) by mouth 3 (three) times daily.  Patient taking differently: Take 20 mg by mouth 3 (three) times daily as needed.  5/21/19 5/20/20 Yes Tiffany Mina MD   blood sugar diagnostic Strp 1 strip by Misc.(Non-Drug; Combo Route) route 2 (two) times daily. 5/20/19  Yes Tiffany Mina MD   blood-glucose meter kit Please provide with insurance covered meter 5/20/19  Yes Tiffany Mina MD   carvedilol (COREG) 25 MG tablet TAKE 1 TABLET(25 MG) BY MOUTH TWICE DAILY WITH MEALS 8/21/18  Yes Rin Martins MD   clonazePAM (KLONOPIN) 1 MG tablet Take 1 tablet (1 mg total) by mouth daily as needed for Anxiety. 8/20/19  Yes Kade Huffman III, NP   clopidogrel (PLAVIX) 75 mg tablet TAKE 1 TABLET BY MOUTH ONCE DAILY 3/26/19  Yes Perlita Ruth MD   donepezil (ARICEPT) 10 MG tablet Take 1 tablet (10 mg total) by mouth 2 (two) times daily. 7/18/17  Yes Toby Paredes MD   DULoxetine (CYMBALTA) 60 MG capsule Take 1 capsule (60 mg total) by mouth 2 (two) times daily. 8/20/19  Yes Kade Huffman III, NP   fremanezumab-vfrm (AJOVY) 225 mg/1.5 mL injection Inject 225 mg into the skin every 28 days. 3/13/19  Yes David Cortez MD   gabapentin (NEURONTIN) 300 MG capsule Take 3 capsules (900 mg total) by mouth 3 (three) times daily.  11/30/16  Yes aDvid Cortez MD   HYDROcodone-acetaminophen (NORCO) 5-325 mg per tablet Take 1 tablet by mouth every 6 (six) hours as needed for Pain. 8/2/19  Yes Matt Pugh Jr., MD   hydrOXYzine (ATARAX) 50 MG tablet Take 1 tablet (50 mg total) by mouth nightly as needed. 8/20/19  Yes Kade Huffman III, NP   hydrOXYzine HCl (ATARAX) 10 MG Tab Take 1 tablet (10 mg total) by mouth 3 (three) times daily as needed (anxiety). 8/20/19  Yes Kade Huffman III, NP   lancets Misc 1 Device by Misc.(Non-Drug; Combo Route) route 2 (two) times daily. 5/20/19  Yes Tiffany Mina MD   losartan (COZAAR) 100 MG tablet Take 1 tablet (100 mg total) by mouth once daily. 7/23/19 7/22/20 Yes Tiffany Mina MD   magnesium 200 mg Tab Take 200 mg by mouth once daily.  Patient taking differently: Take 200 mg by mouth every other day.  3/7/18  Yes David Cortez MD   ondansetron (ZOFRAN-ODT) 4 MG TbDL Take 1 tablet (4 mg total) by mouth every 12 (twelve) hours as needed (nausea). 8/1/19  Yes David Cortez MD   pantoprazole (PROTONIX) 40 MG tablet Take 1 tablet (40 mg total) by mouth once daily. 5/23/19  Yes Silvia Ahmadi MD   rivaroxaban (XARELTO) 20 mg Tab Take 1 tablet (20 mg total) by mouth daily with dinner or evening meal. 1/10/19  Yes Avelino Mejia MD   rosuvastatin (CRESTOR) 40 MG Tab TAKE 1 TABLET(40 MG) BY MOUTH EVERY EVENING 8/1/19  Yes Rin Martins MD   silver sulfADIAZINE 1% (SILVADENE) 1 % cream Apply topically 2 (two) times daily. 5/22/19  Yes Tiffany Mina MD   tiaGABine (GABITRIL) 4 MG tablet Take 1 tablet (4 mg total) by mouth every evening. 7/17/19  Yes David Cortez MD   traZODone (DESYREL) 100 MG tablet Take 1 or 1.5 tablets by mouth before bed as needed for insomnia 8/20/19  Yes Kade Huffman III, NP   TRUE METRIX GLUCOSE METER Misc TEST BG BID 5/30/19  Yes Historical Provider, MD   LORazepam (ATIVAN) 0.5 MG tablet Take 1 tablet (0.5 mg total) by mouth every 6 (six) hours as needed for Anxiety. 4/2/19  "8/1/19  Jewell Hankins MD   zolpidem (AMBIEN CR) 12.5 MG CR tablet TAKE 1 TABLET BY MOUTH NIGHTLY AS NEEDED FOR INSOMNIA 4/4/19 4/23/19  Kade Huffman III, NP       Allergies:  Review of patient's allergies indicates:   Allergen Reactions    Aspirin Hives    Imitrex [sumatriptan] Palpitations    Penicillins Hives and Swelling    Shellfish containing products Anaphylaxis     seafood    Percocet [oxycodone-acetaminophen] Itching     States can take    Reglan [metoclopramide hcl] Other (See Comments)     Parkinsonism        Social History:  Social History     Tobacco Use    Smoking status: Never Smoker    Smokeless tobacco: Never Used   Substance Use Topics    Alcohol use: No    Drug use: No        Review of Systems   Constitutional: Negative for chills, fatigue and fever.   Respiratory: Positive for chest tightness. Negative for cough and shortness of breath.    Cardiovascular: Positive for chest pain.   Gastrointestinal: Negative for abdominal pain and blood in stool.   Genitourinary: Negative for dysuria and frequency.   Musculoskeletal: Positive for arthralgias.   Neurological: Positive for headaches.   Psychiatric/Behavioral: Positive for dysphoric mood. Negative for suicidal ideas.         Health Maintenance:     Immunizations:   Influenza 9/2018, give today  TDap 10/2/2018  Pneumovax 9/2018 here per pt  Shingrix today     Cancer Screening:  PAP: 9/2019 c Dr. Marroquin wnl  Mammogram:  10/3/2018 benign, will schedule repeat  Colonoscopy:  5/2019, polyps x2 removed, tubular adenoma dn tubulovillous adenoma on path c rec repeat in 3 yrs per report.    Objective:   /76 (BP Location: Right arm, Patient Position: Sitting, BP Method: Large (Manual))   Pulse 72   Ht 5' 4" (1.626 m)   Wt 133.7 kg (294 lb 12.1 oz)   LMP 02/04/2016 (Approximate)   SpO2 98%   BMI 50.59 kg/m²        General: AAO x3, no apparent distress  HEENT: PERRL, OP clear  CV: RRR, no m/r/g  Pulm: Lungs CTAB, no crackles, no " wheezes  Abd: s/NT/ND +BS  Extremities: no c/c/e    Labs:     Lab Results   Component Value Date    WBC 3.12 (L) 05/21/2019    HGB 11.1 (L) 05/21/2019    HCT 34.2 (L) 05/21/2019    MCV 92 05/21/2019     05/21/2019     Sodium   Date Value Ref Range Status   05/21/2019 136 136 - 145 mmol/L Final     Potassium   Date Value Ref Range Status   05/21/2019 4.1 3.5 - 5.1 mmol/L Final     Chloride   Date Value Ref Range Status   05/21/2019 108 95 - 110 mmol/L Final     CO2   Date Value Ref Range Status   05/21/2019 24 23 - 29 mmol/L Final     Glucose   Date Value Ref Range Status   05/21/2019 89 70 - 110 mg/dL Final     BUN, Bld   Date Value Ref Range Status   05/21/2019 9 6 - 20 mg/dL Final     Creatinine   Date Value Ref Range Status   05/21/2019 0.8 0.5 - 1.4 mg/dL Final     Calcium   Date Value Ref Range Status   05/21/2019 9.2 8.7 - 10.5 mg/dL Final     Total Protein   Date Value Ref Range Status   05/21/2019 9.3 (H) 6.0 - 8.4 g/dL Final     Albumin   Date Value Ref Range Status   05/21/2019 3.2 (L) 3.5 - 5.2 g/dL Final     Total Bilirubin   Date Value Ref Range Status   05/21/2019 0.3 0.1 - 1.0 mg/dL Final     Comment:     For infants and newborns, interpretation of results should be based  on gestational age, weight and in agreement with clinical  observations.  Premature Infant recommended reference ranges:  Up to 24 hours.............<8.0 mg/dL  Up to 48 hours............<12.0 mg/dL  3-5 days..................<15.0 mg/dL  6-29 days.................<15.0 mg/dL       Alkaline Phosphatase   Date Value Ref Range Status   05/21/2019 50 (L) 55 - 135 U/L Final     AST   Date Value Ref Range Status   05/21/2019 16 10 - 40 U/L Final     ALT   Date Value Ref Range Status   05/21/2019 24 10 - 44 U/L Final     Anion Gap   Date Value Ref Range Status   05/21/2019 4 (L) 8 - 16 mmol/L Final     eGFR if    Date Value Ref Range Status   05/21/2019 >60 >60 mL/min/1.73 m^2 Final     eGFR if non African American    Date Value Ref Range Status   05/21/2019 >60 >60 mL/min/1.73 m^2 Final     Comment:     Calculation used to obtain the estimated glomerular filtration  rate (eGFR) is the CKD-EPI equation.        Lab Results   Component Value Date    HGBA1C 6.1 (H) 05/21/2019     Lab Results   Component Value Date    LDLCALC 65.4 05/17/2019     Lab Results   Component Value Date    TSH 0.553 01/18/2019     Lab Results   Component Value Date    IRON 80 01/18/2019    TIBC 337 01/18/2019    FERRITIN 59 01/18/2019         Assessment/Plan     Amna was seen today for follow-up.    Diagnoses and all orders for this visit:    Essential hypertension  Now at goal since Losartan increased  Home checks mostly in 110s to 120s sys  Cont current regimen  -     Comprehensive metabolic panel; Future    Chest tightness  As per HPI  Given cardiac hx will refer back to Cards for eval  EKG today c paced rhythm  -     IN OFFICE EKG 12-LEAD (to [place])  -     Ambulatory referral to Cardiology    Mixed hyperlipidemia  Lab Results   Component Value Date    LDLCALC 65.4 05/17/2019     Well controlled on Crestor  Already ate today so can check labs at f/u appt    Paroxysmal atrial fibrillation  Long term (current) use of anticoagulants  History of sudden cardiac arrest  Biventricular automatic implantable cardioverter defibrillator in situ  Pacemaker  Nonischemic cardiomyopathy from RV pacing, resolved with upgrade cardiac resynchronization therapy  Mgmt as per Dr. Mejia  Paced rhythm, no acute issues    Thyroid nodule  Hx FNA  Previously followed by Dr. Quijano  Last u/s done 4/2018 c rec 1 year f/u  Had FNA of L sided nodule 7/2018 c/w benign follicular nodule  Will order f/u u/s now.  Lab Results   Component Value Date    TSH 0.553 01/18/2019     -     US Soft Tissue Head Neck Thyroid; Future    History of TIA (transient ischemic attack)  History of CVA (cerebrovascular accident)  No acute issues  Cont secondary prevention c BP and lipid  control    Depression, major, recurrent, moderate  Anxiety  Managed by psych, no acute issues    Chronic migraine without aura, with intractable migraine, so stated, with status migrainosus  Followed by neuro, gets botox injections, mgmt as per Dr. Cortez    Gastroesophageal reflux disease without esophagitis  Stable, no issues    Vitamin D deficiency  States she takes a weekly supplement, need to verify dose, will repeat labs  -     Vitamin D; Future    Prediabetes  Lab Results   Component Value Date    HGBA1C 6.1 (H) 05/21/2019     Pt was counseled on the need to avoid intake of simple sugars and minimize carbohydrates.  Also recommended weight loss and daily exercise.  -     Hemoglobin A1c; Future    Anemia due to vitamin B12 deficiency, unspecified B12 deficiency type  Not taking B12  Advised to take daily  Repeat labs now  -     CBC auto differential; Future  -     Vitamin B12; Future  -     Methylmalonic acid, serum; Future    Screening mammogram, encounter for  -     Mammo Digital Screening Bilat w/ Surendra; Future    HM as above  RTC in 4mos, sooner if needed.  Labs today.    Tiffany Mina MD  Department of Internal Medicine - [facility]  09/17/2019

## 2019-09-18 ENCOUNTER — TELEPHONE (OUTPATIENT)
Dept: INTERNAL MEDICINE | Facility: CLINIC | Age: 54
End: 2019-09-18

## 2019-09-18 DIAGNOSIS — R73.03 PREDIABETES: ICD-10-CM

## 2019-09-18 DIAGNOSIS — E55.9 VITAMIN D DEFICIENCY: Primary | ICD-10-CM

## 2019-09-18 DIAGNOSIS — D51.9 ANEMIA DUE TO VITAMIN B12 DEFICIENCY, UNSPECIFIED B12 DEFICIENCY TYPE: ICD-10-CM

## 2019-09-18 RX ORDER — MAGNESIUM 200 MG
1000 TABLET ORAL DAILY
Qty: 90 TABLET | Refills: 3 | Status: SHIPPED | OUTPATIENT
Start: 2019-09-18 | End: 2020-03-03 | Stop reason: SDUPTHER

## 2019-09-18 RX ORDER — ERGOCALCIFEROL 1.25 MG/1
50000 CAPSULE ORAL
Qty: 24 CAPSULE | Refills: 3 | Status: SHIPPED | OUTPATIENT
Start: 2019-09-19 | End: 2020-03-03 | Stop reason: SDUPTHER

## 2019-09-18 RX ORDER — METFORMIN HYDROCHLORIDE 500 MG/1
TABLET ORAL
Qty: 187 TABLET | Refills: 3 | Status: SHIPPED | OUTPATIENT
Start: 2019-09-18 | End: 2020-03-03 | Stop reason: SDUPTHER

## 2019-09-18 NOTE — TELEPHONE ENCOUNTER
Pt notified of all lab results.  A1c worse, rec starting metformin to prevent diabetes.  Advised to take nightly for a week and then bid after that if GI s/e tolerable.  B12 low but has not been taking meds, will restart b12 1000 daily.  Vit D better but suboptimal, has been on ergocalciferol weekly, will increase to twice weekly.

## 2019-09-20 LAB — METHYLMALONATE SERPL-SCNC: 0.16 UMOL/L

## 2019-09-25 VITALS
DIASTOLIC BLOOD PRESSURE: 78 MMHG | OXYGEN SATURATION: 97 % | RESPIRATION RATE: 20 BRPM | HEART RATE: 74 BPM | SYSTOLIC BLOOD PRESSURE: 128 MMHG

## 2019-09-25 NOTE — PROGRESS NOTES
AAOx3.  Recovering from ortho procedure.  Lives w/ family but visit occurred alone.  NIHSS-0; does not smoke; refrains from adding salt, but dos have salty and fast food occasionally; compliant w/ all meds; walks for exercise.  Education provide on goal of stroke mobile, s/s of stroke, importance of diet, exercise, medications.  Understanding verbalized.  Encouraged to utilize stroke team for any concern.

## 2019-09-27 ENCOUNTER — PATIENT OUTREACH (OUTPATIENT)
Dept: ADMINISTRATIVE | Facility: OTHER | Age: 54
End: 2019-09-27

## 2019-09-30 ENCOUNTER — EXTERNAL CHRONIC CARE MANAGEMENT (OUTPATIENT)
Dept: PRIMARY CARE CLINIC | Facility: CLINIC | Age: 54
End: 2019-09-30
Payer: MEDICARE

## 2019-09-30 DIAGNOSIS — G43.711 CHRONIC MIGRAINE WITHOUT AURA, WITH INTRACTABLE MIGRAINE, SO STATED, WITH STATUS MIGRAINOSUS: Primary | ICD-10-CM

## 2019-09-30 PROCEDURE — 99490 PR CHRONIC CARE MGMT, 1ST 20 MIN: ICD-10-PCS | Mod: S$PBB,,, | Performed by: PSYCHIATRY & NEUROLOGY

## 2019-09-30 PROCEDURE — 99490 CHRNC CARE MGMT STAFF 1ST 20: CPT | Mod: S$PBB,,, | Performed by: PSYCHIATRY & NEUROLOGY

## 2019-09-30 PROCEDURE — 99490 CHRNC CARE MGMT STAFF 1ST 20: CPT | Mod: PBBFAC | Performed by: PSYCHIATRY & NEUROLOGY

## 2019-10-01 ENCOUNTER — OFFICE VISIT (OUTPATIENT)
Dept: NEUROLOGY | Facility: CLINIC | Age: 54
End: 2019-10-01
Payer: MEDICARE

## 2019-10-01 VITALS
SYSTOLIC BLOOD PRESSURE: 155 MMHG | WEIGHT: 293 LBS | BODY MASS INDEX: 50.02 KG/M2 | DIASTOLIC BLOOD PRESSURE: 74 MMHG | HEART RATE: 71 BPM | HEIGHT: 64 IN

## 2019-10-01 DIAGNOSIS — G43.711 CHRONIC MIGRAINE WITHOUT AURA, WITH INTRACTABLE MIGRAINE, SO STATED, WITH STATUS MIGRAINOSUS: Primary | ICD-10-CM

## 2019-10-01 DIAGNOSIS — G43.711 CHRONIC MIGRAINE WITHOUT AURA, WITH INTRACTABLE MIGRAINE, SO STATED, WITH STATUS MIGRAINOSUS: ICD-10-CM

## 2019-10-01 PROCEDURE — 64405 PR NERVE BLOCK INJ, ANES/STEROID, OCCIPITAL: ICD-10-PCS | Mod: S$PBB,50,, | Performed by: PSYCHIATRY & NEUROLOGY

## 2019-10-01 PROCEDURE — 99213 PR OFFICE/OUTPT VISIT, EST, LEVL III, 20-29 MIN: ICD-10-PCS | Mod: 25,S$PBB,, | Performed by: PSYCHIATRY & NEUROLOGY

## 2019-10-01 PROCEDURE — 99213 OFFICE O/P EST LOW 20 MIN: CPT | Mod: 25,S$PBB,, | Performed by: PSYCHIATRY & NEUROLOGY

## 2019-10-01 PROCEDURE — 64405 NJX AA&/STRD GR OCPL NRV: CPT | Mod: PBBFAC,50 | Performed by: PSYCHIATRY & NEUROLOGY

## 2019-10-01 PROCEDURE — 99213 OFFICE O/P EST LOW 20 MIN: CPT | Mod: PBBFAC,25 | Performed by: PSYCHIATRY & NEUROLOGY

## 2019-10-01 PROCEDURE — 99999 PR PBB SHADOW E&M-EST. PATIENT-LVL III: ICD-10-PCS | Mod: PBBFAC,,, | Performed by: PSYCHIATRY & NEUROLOGY

## 2019-10-01 PROCEDURE — 99999 PR PBB SHADOW E&M-EST. PATIENT-LVL III: CPT | Mod: PBBFAC,,, | Performed by: PSYCHIATRY & NEUROLOGY

## 2019-10-01 PROCEDURE — 64405 NJX AA&/STRD GR OCPL NRV: CPT | Mod: S$PBB,50,, | Performed by: PSYCHIATRY & NEUROLOGY

## 2019-10-01 RX ORDER — METHYLPREDNISOLONE ACETATE 40 MG/ML
40 INJECTION, SUSPENSION INTRA-ARTICULAR; INTRALESIONAL; INTRAMUSCULAR; SOFT TISSUE
Status: COMPLETED | OUTPATIENT
Start: 2019-10-01 | End: 2019-10-01

## 2019-10-01 RX ORDER — FLURBIPROFEN 100 MG/1
100 TABLET, FILM COATED ORAL 2 TIMES DAILY PRN
Qty: 12 TABLET | Refills: 12 | Status: SHIPPED | OUTPATIENT
Start: 2019-10-01 | End: 2022-09-06 | Stop reason: SDUPTHER

## 2019-10-01 RX ORDER — FLURBIPROFEN 100 MG/1
TABLET, FILM COATED ORAL
Qty: 180 TABLET | Refills: 12 | OUTPATIENT
Start: 2019-10-01

## 2019-10-01 RX ADMIN — METHYLPREDNISOLONE ACETATE 40 MG: 40 INJECTION, SUSPENSION INTRALESIONAL; INTRAMUSCULAR; INTRASYNOVIAL; SOFT TISSUE at 01:10

## 2019-10-01 NOTE — PROGRESS NOTES
Follow up:   HA are more frequent   Taking 1600 mg motrin + zanaflex   Went to ER recently     Prior note:   HA overall stable in Hz and intensity   Reports a wearing off effect of BOTOX since last week   Taking zanaflex 8 mg TID        Prior note:   HA started 12/24   She feels BOTOX wore off last week   Reports GI distress from taking to many motrin      Prior note:   4/week HA - L vertex   Jabs - vertex either sides      She reports migraine that woke her up in the morning - associated with L side facial droop      Prior note:   She gets acceptable relief for 2.5 months after BOTOX      Prior note:   No recurrent stroke symptoms   starting having whole body tremors since this AM. Took 1 tablet of lorazepam   Last night had a HA, took trazodone       Admit Date: 4/11/2018  2:03 PM   Discharge Date and Time: 4/12/2018  8:30 PM   History of Present Illness: Ms. Chawla is a 53 yo F with afib on Eliquis (last dose this am), prior cardiac arrest with associated acute ischemic stroke with residual mild L sided weakness, CAD with ICD in situ, HTN, and HLD who presented with acute R sided weakness and sensation changes.  She originally called EMS for palpitations, but developed acute right sided facial droop and RUE weakness at 1:40pm today, after EMS had arrived.  She denies changes to speech or vision.  SBP en route 200/100.  She is ineligible for tPA 2/2 Eliquis use.  She is not suspected to have an LVO.  She will be admitted to [facility] for observation overnight.     Hospital Course (synopsis of major diagnoses, care, treatment, and services provided during the course of the hospital stay): Ms. Chawla was admitted for acute stroke workup. Pt with pacemaker so unable to obtain MRI Brain; CTA H/N demonstrated no evidence acute infarction, though did exhibit noncalcified plaquing of carotid bifurcations and proximal ICAs with < 50% stenosis, so Plavix was added to pt's daily home regimen. Concerned for TIA at this time  though Migraine very high on differential due to pt's hx headaches, including presently this admission. Hypertensive urgency/emergency also considered due to pt's very elevated levels with EMS. Per chart review, Eliquis was a new medication since 4/3/18 (had switched from Coumadin), however staff Faraz concerned that pt had a potential event while on Eliquis. She wished to switch to Pradaxa as an alternative DOAC (as it has an option for reversal), however changed to Xarelto per pt's insurance coverage.   While admitted, pt given cocktail for HA which she has received previously at the infusion clinic - IV Magnesium, IM Toradol, 2mg IV Morphine, 500cc NS bolus (IV Benadryl not included this time as pt had received a dose earlier that day prior to contrast imaging.) HA improved somewhat and pt was encouraged to follow up with Dr. Cortez for continued management of botox injections, etc. At that time, pt also found with hyponatremia; d/c'd Clorthalidone and plan was made for pt to [facility] 4/16/18 for repeat CMP to evaluate Na level and BP check since discontinuing this medication.  Ms. Chawla was evaluated and treated by PT, OT, and SLP who recommend d/c with outpatient PT and OT. She was discharged home 4/12/18 with [facility] follow-up Monday      Prior note:   2 weeks ago BOTOX effect wore off   Used up all her percocet   3 wks h/o:   Reports frequent falls - falling forward, no LOC, no injuries, able to protect falls by hands   triggers - unknown   Also occ stumbling   Dragging L foot   No Pain in back or legs   No numbness or weakness in legs   No B/B incontinence   No lightheadedness      Prior note:    Flare up of TMJ and HA during daughter's wedding   NSAIDS helped   2 weeks ago BOTOX effect wore off      Prior note:   2 weeks ago BOTOX effect wore off   Has severe spasm and pain in the traps catalina   Weather change has provoked severe migraine + nausea   She started coumadin        Prior note:   3 weeks ago BOTOX effect wore off   She reports severe daily HA    During BOTOX periods she feels she  her HA. Much less intense      Prior note:   The patient is a 51-year-old female who [facility]. Since her last visit, she reports growing frustration   about the fact that nothing has helped her with regards to her headaches. She   continues to experience daily headaches. She takes mefenamic acid and   tizanidine every night, which only provides her two hours of relief. She is   unable to sleep because of the refractory headaches. She is incapacitated   because of severe headaches. She reports minimal relief with infusions that she  gets on a periodic basis. She does report an improvement overall with the   Botox. However, this effect has worn off in the last two weeks. She also   reports an episode wherein she fell with loss of consciousness, which was not   provoked. As a result, she injured her ankle and is currently wearing a boot.      Prior note:   since last week - Reports vertigo for 6 - 7 minutes upto 3 times daily   Nearly daily severe HA - no response to ponstel , compazine   She is late for her BOTOX - last 2 weeks were painful       Follow up:   R sided Headache is constant max at TMJ on R   Prior note:   She reports new episodes of R face tingling. Sh also reports 2 episodes of blurred vision lasting 15 minutes. These hapended while watching TV. Her pain remains unchanged   Follow up:   2 days of severe HA during Thanksgiving   Pounding bifrontal region   Pain worse over L eye brow   Follow up:   Reports bifrontal severe HA (worse on L) with no nausea with sudden onset . Occurs daily   NO note:  I found no papilledema or other changes to suggest elevated intracranial pressure or other alternative etiology for her headaches. Repeat exam in two years.  HA are less frequent and less intense.   (R levator scapula spasm)  "  symptoms better with lateral flexion to L   Prior note:   She still has daily HA with severe sharp stabbing pain behind L eye lasting for 15 sec   Prior note:   Daily pain. 2/week - nausea.  Shu Lares RN at 9/17/2014 4:53 PM  Pt presented to clinic for medical advice. Pt is seen by Dr. Paredes and Dr. Cortez.  1) She went ER on 9/13/14 for a migraine. She was given Reglan, Benadryl, MSO4 and IVF. A couple days later she developed an all over body "tremor". She states "I think I have Parkinson's". - Per Dr. Paredes, medication related tremor (Reglan & Benadryl). Informed her it should wear off in a few days. If not, she is to call and let us know.  2) Headaches - triggered by insomnia.   Current meds:  Ketoprofen - she took it once and said her "throat closed up" and she's not supposed to have anything with aspirin in it.  Nortriptyline - she was taking it at night, but it didn't help her sleep, so she stopped it.  Per Dr. Cortez, discontinue Ketoprofen and Nortriptyline. Start Effexor XR 37.5mg QD, tizanidine 4mg BID PRN headache and Klonopin 0.25 - 0.5mg QHS PRN. Will schedule pt for sphenocath procedure within the next week.   Prior note:   48 yo woman with history of HTN and asthma, both reportedly controlled, in hospital early 2013 after "passed out" and became unresponsive. CPR in the field for 8 minutes until EMS arrived. Per ED note, on arrival she was found to be in PEA, given epinephrine. Vfib/Vtach was achieved which was shocked x1. Patient converted to sinus tachycardia after shock. I do not know the time course for the above treatment. On arrival to facility patient was in sinus tachycardia with strong pulses, intubated and unresponsive. ET tube placement was confirmed with direct laryngoscopy and good bilateral breath sounds were heard after the tube was pulled back 2 cm. Reported to have "withdrawal" movements in ER during stabilization, then sedated and cooling protocol initiated. Had " "remarkable   recovery and first seen in clinic in 2013.   Headache history: cluster   Age of onset -    Location - behind L eye   Nature of pain - sharp   Prodrome - no   Aura - No   Duration of headache - 6-7 min   Time to peak intensity -   Pain scale - range of intensity - 9/10   Frequency - daily   Status Migrainosus history - 1-3 days   Time of day of most headaches- anytime   Associated symptoms with the headache:   Red eyes   swelling of L face   Headache history: Migraine   Age of onset -    Location - bifrontal   Nature of pain - Pounding   Prodrome - no   Aura - No   Duration of headache - > 4 hrs   Time to peak intensity -   Pain scale - range of intensity - 9/10   Frequency - 5/week   Status Migrainosus history - 1-3 days   Time of day of most headaches- anytime   Associated symptoms with the headache:   Meningeal symptoms - photophobia, phonophobia, exercise intolerance +   Nausea/vomitting +   Nasal drainage   Visual blurriness +   Pallor/flushing   Dizziness   Vertigo   Confusion   Impaired concentration +   Pain worsened with physical activity +   Neck pain +   Symptoms of increased intracranial pressure:   Whooshing sounds - no   Visual spots/blotches - no   Headache Triggers: unknown   Other comorbid conditions:   Anxiety - no   Motion sickness symptom - no       Treatment history:   Resolution of headache with sleep - yes       Meds tried:   Trigger points involvin/9/15 helped for 1 day   Site: Right   Levator scapula   Pharmacological agent:   0.5% Sensorcaine solution   No complications were noted.  Supraorbital block - helped for 2 days   Tylenol - not help   imitrex - chest tightness   alleve - help   topamax - not helping   Neurontin - not helping   Paxil, Elavil - not help   seroquel - nightmare   Ketoprofen - she took it once and said her "throat closed up" and she's not supposed to have anything with aspirin in it.  Nortriptyline - she was taking it at night, but it " didn't help her sleep, so she stopped it.  reglan - parkinsonism   zanaflex - help   Toradol 30 mg IM inj at home - too expensive   BOTOX round #1 9/3/15 - helped (R levator scapula spasm)   Round #2 12/3/15 - helped   Round#3 3/316 - helped   Round #4 6/1/16 - helped   BOTOX#5 9/15/16 - helped     BOTOX#6 - 11/30/16 - helped   BOTOX#7 - 3/9/17 - helped    BOTOX#8 - 5/25/17 - helped    BOTOX#9 8/10/17 - helped   BOTOX#10 10/19/17 - helped   BOTOX#11 12/27/17 - helped   BOTOX#12 3/7/18 - helped   BOTOX#13 5/16/18 - helped    BOTOX#14 8/1/18 - helped     BOTOX#15 10/19/18 - helped      BOTOX#16 12/28/18 - helped   BOTOX#17 3/13/19 - helped    BOTOX#18 5/23/19 - helped     BOTOX#19 8/1/19 - helped     AIMOVIG (sept 1rst week, Oct first week, Nov first wk 140 mg, Dec first wk 140 mg, Jan, Feb) no benefit   Constipation +      Can not do RFA - pt has pacemaker   Procedure: IOVERA peripheral nerve focus cold therapy (non ablative cryotherapy) 12/30/15 - not help   Indication: Cryotherapy of select peripheral nerves of the head was performed to treat chronic headaches that failed to respond to several preventive pharmacological therapies.   Sedation: None   Informed consent: The procedure, risks, benefits and options were discussed with the patient. There are no contraindications to the procedure. The patient expressed understanding and agreed to the procedure. I verify that I personally obtained the patient's consent prior to the start of the procedure.  Location:  Bilateral Supra orbital nerve (3 cycles on R and 1 cycle on L)   Bilateral Occipital nerve   3 cycles   Pain relief: Pain score reduced 10/10->0/10   9/26 Bilateral Sphenopalatine Ganglion and bilateral Maxillary Branch (Trigeminal Nerve) Block - no help   ONB - not help   lilly Pagan RN at 12/31/2014 3:54 PM                  Status: Signed                              Expand All Collapse All   HEADACHE FASTTRACK  PAIN 7/10 NAUSEA 0/10  ROUND 1: TORADOL,  IMITREX, MORPHINE, BENADRYL, AND MAGNESIUM  PAIN 7/10 NAUSEA 0/10  PT STATED SHE WAS READY TO GO HOME AND GO TO SLEEP. PT D/C'ED IN NAD                     Shannon Pagan RN at 10/5/2015 12:49 PM                  Status: Signed                              Expand All Collapse All   HEADACHE FASTTRACK  PAIN 8/10 NAUSEA 10/10  FIRST ROUND: tORADOL, BENADRYL, COMPAZINE, IMITREX, MAGNESIUM, AND VALPROATE.  PAIN 1/10 NAUSEA 0/10  PT READY TO GO HOME AND FEELING BETTER. PT LEFT IN NAD WITH FAMILY MEMBER  TOTAL TIME: [id number]                Shannon Pagan RN at 10/20/2015 3:05 PM                  Status: Signed                              Expand All Collapse All   HEADACHE FASTTRACK  PAIN 10/10 NAUSEA 0/10  FIRST ROUND: tORADOL, BENADRYL, COMPAZINE, IMITREX, MAGNESIUM, AND VALPROATE.  BP BEING MONITORED EVERY 15 MIN AND REMAINED 170'S/80-90'S. DR CHOPRA NOTIFIED AND STATED TO MAKE SURE BP DID NOT EXCEED 180 SYSTOLIC OR PT SHOULD REPORT TO ED. BP AT 1500 183/92. DR CHOPRA NOTIFIED AND GAVE ORDER TO STOP INFUSION AND SEND PATIENT TO ED. PT BROUGHT TO ED BY INFUSION NURSE VIA WHEELCHAIR.   PAIN 8/10 NAUSEA 0/10  TOTAL TIME: [id number]                                   Progress Notes                                      Shannon Pagan RN at 2/24/2016 1:36 PM       Status: Signed                  Expand All Collapse All   HEADACHE FASTTRACK  PAIN 10/10 NAUSEA 7/10  FIRST ROUND: tORADOL, BENADRYL, AND MORPHINE-BP RANGING FROM 177-203/84-92, HR 60-82  MD NOTIFIED AND GAVE ORDERS TO SEND TO ED. PT LEFT VIA W/C WITH INFUSION NURSE ON WAY TO ED.            Headache risk factors:   H/o TBI - [facility] (2013) + neck sprain   H/o Meningitis - no   F/h Aneurysms - no       Review of Systems   Constitutional: Negative.   HENT: Negative.   Eyes: Negative.   Respiratory: Negative.   Cardiovascular: Negative.   Gastrointestinal: Negative.   Endocrine: Negative.   Genitourinary: Negative.   Musculoskeletal: Negative.   Skin: Negative.    Allergic/Immunologic: Negative.   Hematological: Negative.   Psychiatric/Behavioral: insomnia      Objective:     Physical Exam   Constitutional: She is oriented to person, place, and time. She appears well-developed.   obesity   Psychiatric: She has a normal mood and flat affect. Her behavior is normal. Judgment and thought content normal.   Headache Exam:  Optic disc is flat OU   Temples appear symmetric  No V1,V2,V3 distribution allodynia/hyperalgesia catalina   No facial swelling  No gross TMJ abnormalities   No ptosis   No conj injection   No meningismus   No neck stiffness  No C spine tenderness  Cervical facet tenderness on palpation catalina   No tender points over trapezium   catalina supraorbital nerve exit point tenderness  catalina occipital nerve exit point tenderness  No auriculotemporal nerve tenderness   Tenderness of levator scapula catalina      Gait - wide based gait   Tremor in hands, legs, voice and neck      ssessment:          1.  Occipital neuralgia          2.  Cervicalgia          3.  4  5  6 Chronic migraine without aura, with intractable migraine, so stated, with status migrainosus (post traumatic) post concussive?  Depression   TMJ - R sided   Insomnia - SHIRA      Head CT  Findings: There is no evidence of acute or recent major vascular territory cerebral infarction, parenchymal hemorrhage, or intra-axial mass.  There are no extra-axial masses or abnormal fluid collections.  The ventricular system is within normal limits of size for age and shows no distortion by mass-effect or evidence of hydrocephalus.  There is no fracture or destructive osseous lesion.  The extracranial soft tissues are unremarkable.  The visualized paranasal sinuses and mastoid air cells are clear.   Impression    No acute intracranial abnormality.  ______________________________________     Electronically signed by resident: KRISSY MAYERS MD  Date: 03/14/16  Time: 17:09                                               Plan:          1.  Prophylactic medication -   Despiramine 25 mg AM (for dizziness)   Cymbalta 120 mg daily   Aricept 20 mg daily   Neurontin 900 mg TID    Magnesium 200 mg daily  Topamax 200 mg BID   Cont B2, B6 supplements   2, Breakthrough headache - zanaflex 8 mg Q8, flurbiprofen   PRN percocet Q=30  Multiple alternative treatment options were offered to the patient   3. Refrain from over counter pain medications. Discussed medication overuse headache.cont Magnesium 400 mg PO BID   4. Occipital neuralgia - If medical management is ineffective may consider occipital nerve blocks.   5. I again urged the patient to keep a headache calender.    f/up Dr. Rock for TBI    B12 injection - 5/25/17, 12/28/18, 5/23/19  Vit D supplements    f/up sleep clinic - CPAP pending      Pain cream      Cont AJOVY (April 2019- ) No side effects      ONB 2 weeks prior to next BOTOX       Occipital Nerve block:  After obtaining informed consent, the patient was positioned in seated position with neck flexed.     Site: Bilateral     Pharmacological agent:   40 mg Methylprednisolone + 2% Lidocaine     The patients occipital region and upper neck was prepped in the usual sterile fashion. Area of maximal tenderness was identified by palpation. A 22 gauge, 1.5 inch needle was introduced under the skin down to the periosteum just <1cm lateral to the occipital artery. After aspiration was negative or blood approximately 4 cc of pharmacological agent was injected. The patient tolerated the procedure well with no adverse events. The patient was able to get up and walk  under their own power.  No complications were noted.    After obtaining informed consent, the patient was positioned in seated position with neck slightly  flexed.     Regional block involving nerves:  Site:  Bilateral    Supraorbital nerve     Pharmacological agent:   2% Lidocaine solution     The above areas were prepped in the usual sterile fashion. Areas of maximal tenderness were   identified by palpation. A 30 gauge, 1 inch needle was introduced under the skin for regional nerve block injections and into the muscle belly for trigger point injections. After aspiration was negative or blood approximately 2 cc of pharmacological agent was injected at each site. The patient tolerated the procedure well with no adverse events. The patient was able to get up and walk  under their own power.  No complications were noted.    I recommend the patient take Tylenol and/or try ice pack tonight to help with the post procedure soreness.   If the patient notices any new neurological symptoms I urged the patient to seek immediate medical care at local ER. The patient verbalized understanding.

## 2019-10-04 ENCOUNTER — IMMUNIZATION (OUTPATIENT)
Dept: INTERNAL MEDICINE | Facility: CLINIC | Age: 54
End: 2019-10-04
Payer: MEDICARE

## 2019-10-04 ENCOUNTER — HOSPITAL ENCOUNTER (OUTPATIENT)
Dept: RADIOLOGY | Facility: HOSPITAL | Age: 54
Discharge: HOME | End: 2019-10-04
Attending: INTERNAL MEDICINE
Payer: MEDICARE

## 2019-10-04 ENCOUNTER — IMMUNIZATION (OUTPATIENT)
Dept: PHARMACY | Facility: CLINIC | Age: 54
End: 2019-10-04
Payer: MEDICARE

## 2019-10-04 DIAGNOSIS — E04.1 THYROID NODULE: ICD-10-CM

## 2019-10-04 DIAGNOSIS — Z12.31 SCREENING MAMMOGRAM, ENCOUNTER FOR: ICD-10-CM

## 2019-10-04 PROCEDURE — 77067 SCR MAMMO BI INCL CAD: CPT | Mod: TC

## 2019-10-04 PROCEDURE — 76536 US EXAM OF HEAD AND NECK: CPT | Mod: TC

## 2019-10-04 PROCEDURE — 76536 US SOFT TISSUE HEAD NECK THYROID: ICD-10-PCS | Mod: 26,GC,, | Performed by: RADIOLOGY

## 2019-10-04 PROCEDURE — 77063 MAMMO DIGITAL SCREENING BILAT WITH TOMOSYNTHESIS_CAD: ICD-10-PCS | Mod: 26,,, | Performed by: RADIOLOGY

## 2019-10-04 PROCEDURE — 90686 IIV4 VACC NO PRSV 0.5 ML IM: CPT | Mod: PBBFAC

## 2019-10-04 PROCEDURE — 77067 MAMMO DIGITAL SCREENING BILAT WITH TOMOSYNTHESIS_CAD: ICD-10-PCS | Mod: 26,,, | Performed by: RADIOLOGY

## 2019-10-04 PROCEDURE — 77063 BREAST TOMOSYNTHESIS BI: CPT | Mod: 26,,, | Performed by: RADIOLOGY

## 2019-10-04 PROCEDURE — 76536 US EXAM OF HEAD AND NECK: CPT | Mod: 26,GC,, | Performed by: RADIOLOGY

## 2019-10-04 PROCEDURE — 77067 SCR MAMMO BI INCL CAD: CPT | Mod: 26,,, | Performed by: RADIOLOGY

## 2019-10-08 ENCOUNTER — OFFICE VISIT (OUTPATIENT)
Dept: ELECTROPHYSIOLOGY | Facility: CLINIC | Age: 54
End: 2019-10-08
Payer: MEDICARE

## 2019-10-08 VITALS
DIASTOLIC BLOOD PRESSURE: 60 MMHG | HEART RATE: 72 BPM | WEIGHT: 290 LBS | HEIGHT: 64 IN | SYSTOLIC BLOOD PRESSURE: 111 MMHG | BODY MASS INDEX: 49.51 KG/M2

## 2019-10-08 DIAGNOSIS — I48.0 PAROXYSMAL ATRIAL FIBRILLATION: ICD-10-CM

## 2019-10-08 DIAGNOSIS — I44.2 COMPLETE AV BLOCK: Primary | ICD-10-CM

## 2019-10-08 DIAGNOSIS — I44.2 COMPLETE AV BLOCK: ICD-10-CM

## 2019-10-08 DIAGNOSIS — I42.8 NONISCHEMIC CARDIOMYOPATHY: ICD-10-CM

## 2019-10-08 DIAGNOSIS — G47.33 OSA (OBSTRUCTIVE SLEEP APNEA): ICD-10-CM

## 2019-10-08 DIAGNOSIS — Z86.74 HISTORY OF SUDDEN CARDIAC ARREST: ICD-10-CM

## 2019-10-08 DIAGNOSIS — Z95.810 BIVENTRICULAR AUTOMATIC IMPLANTABLE CARDIOVERTER DEFIBRILLATOR IN SITU: ICD-10-CM

## 2019-10-08 DIAGNOSIS — E66.01 OBESITY, CLASS III, BMI 40-49.9 (MORBID OBESITY): ICD-10-CM

## 2019-10-08 PROCEDURE — 93010 RHYTHM STRIP: ICD-10-PCS | Mod: S$PBB,,, | Performed by: INTERNAL MEDICINE

## 2019-10-08 PROCEDURE — 93005 ELECTROCARDIOGRAM TRACING: CPT | Mod: PBBFAC | Performed by: INTERNAL MEDICINE

## 2019-10-08 PROCEDURE — 99214 OFFICE O/P EST MOD 30 MIN: CPT | Mod: S$PBB,,, | Performed by: INTERNAL MEDICINE

## 2019-10-08 PROCEDURE — 99999 PR PBB SHADOW E&M-EST. PATIENT-LVL V: CPT | Mod: PBBFAC,,, | Performed by: INTERNAL MEDICINE

## 2019-10-08 PROCEDURE — 99215 OFFICE O/P EST HI 40 MIN: CPT | Mod: PBBFAC | Performed by: INTERNAL MEDICINE

## 2019-10-08 PROCEDURE — 99999 PR PBB SHADOW E&M-EST. PATIENT-LVL V: ICD-10-PCS | Mod: PBBFAC,,, | Performed by: INTERNAL MEDICINE

## 2019-10-08 PROCEDURE — 93010 ELECTROCARDIOGRAM REPORT: CPT | Mod: S$PBB,,, | Performed by: INTERNAL MEDICINE

## 2019-10-08 PROCEDURE — 99214 PR OFFICE/OUTPT VISIT, EST, LEVL IV, 30-39 MIN: ICD-10-PCS | Mod: S$PBB,,, | Performed by: INTERNAL MEDICINE

## 2019-10-08 NOTE — PROGRESS NOTES
Subjective:    Patient ID:  Amna Chawla is a 54 y.o. female who presents for follow-up of CHB      HPI    Prior Hx:  I had the pleasure of seeing Amna Chawla in follow-up today for her history of cardiac arrest, heart block, and ICD implantation. As you are aware, she is a 53-year old female, former patient of Dr. Humberto Martins and patient of mine I cared for when she initially presented in cardiac arrest as well as followed in my general cardiology fellow clinic, who was admitted to [facility] [place] [facility] in 2/2013 after having a cardiac arrest.  She was working as a school  and collapsed at work.  On EMS arrival it was described that she was pulseless and given epinephrine (? Initially PEA) however after epinephrine noted to be in VF and was resuscitated with defibrillation/ACLS.  She underwent hypothermia protocol. Her post-arrest course was notable or intermittent 3rd-degree AV block. On 2/15/2013, a dual chamber ICD was implanted. Her EF prior to discharge was 60%. She had a negative coronary CTA during that admission.  In subsequent follow-up she underwent a pharmacologic stress ECHO in 2014 which showed a preserved LVEF and no ischemia.     Subsequent ICD interrogations show no VT, 100% RV pacing.  She was also diagnosed with symptomatic paroxysmal AF and was started on anticoagulation. She preferred coumadin.  She noted new onset dyspnea on exertion 9/2017. We performed an echocardiogram and her EF was 40-45% in setting of chronic RV pacing. Recent PET stress showed no ischemia/infarct. We proceeded with upgrade to CRT-D which was performed on 9/27/2017.     At Ms. Chawla's 3 month post CRT-D upgrade visit she noted more energy and improvement in the shortness of breath. She noted very rare diaphragm stimulation.    Ms. Chawla presents for 6 month post-CRT upgrade follow-up in 4/2018. We planned on getting an ECHO at the 6 month chu however it was done early at the 4 month chu. Her EF improved to 45-50%  on that study (see below). Her main issues are complex headaches for which she is seeing neurology.     Ms. Chawla presented for 6 month follow-up 10/2018. She was recently hospitalized for left sided weakness in setting of severe hypertension. CT head was negative for any acute ischemia/bleed. She briefly held xarelto in August 2018 for severe epistaxis and then resumed. Device interrogation noted no recent AF. Repeat ECHO 9/2018 noted EF normalized at 55-60%.     Interim Hx:  Ms. Chawla returns for follow-up. Her CRT-D is operating normally. No arrhythmias. She is 12%A and 99%BiV paced.      I reviewed today's ECG tracing, which shows sinus rhythm with BiV paced complexes with QRS duration of 130msec.    Review of Systems   Constitution: Negative for fever and malaise/fatigue.   HENT: Negative for congestion and sore throat.    Eyes: Negative for blurred vision and visual disturbance.   Cardiovascular: Negative for chest pain, dyspnea on exertion, near-syncope, orthopnea, palpitations, paroxysmal nocturnal dyspnea and syncope.   Respiratory: Negative for cough and shortness of breath.    Hematologic/Lymphatic: Negative for bleeding problem. Does not bruise/bleed easily.   Skin: Negative.    Musculoskeletal: Negative.    Gastrointestinal: Negative for hematochezia and melena.   Neurological: Positive for headaches. Negative for focal weakness and weakness.        Objective:    Physical Exam   Constitutional: She is oriented to person, place, and time. She appears well-developed and well-nourished. No distress.   HENT:   Head: Normocephalic and atraumatic.   Eyes: Conjunctivae are normal. Right eye exhibits no discharge. Left eye exhibits no discharge.   Neck: Neck supple. No JVD present.   Cardiovascular: Normal rate, regular rhythm and normal heart sounds. Exam reveals no gallop and no friction rub.   No murmur heard.  Pulmonary/Chest: Effort normal and breath sounds normal. No respiratory distress. She has no  wheezes. She has no rales.   ICD site well healed   Abdominal: Soft. Bowel sounds are normal. She exhibits no distension. There is no tenderness. There is no rebound.   Musculoskeletal: She exhibits no edema.   Neurological: She is alert and oriented to person, place, and time.   Skin: Skin is warm and dry. She is not diaphoretic.   Psychiatric: She has a normal mood and affect. Her behavior is normal. Judgment and thought content normal.   Vitals reviewed.        Assessment:       1. Complete AV block    2. Biventricular automatic implantable cardioverter defibrillator in situ    3. History of sudden cardiac arrest    4. Nonischemic cardiomyopathy from RV pacing, resolved with upgrade cardiac resynchronization therapy    5. Paroxysmal atrial fibrillation    6. Obesity, Class III, BMI 40-49.9 (morbid obesity)    7. SHIRA (obstructive sleep apnea)         Plan:       In summary, Mrs. Chawla is a pleasant 55 yo cardiac arrest survivor with VF noted during arrest, no ischemic heart disease with preserved LVEF, intermittent 3rd degree AV block, and symptomatic LVEF of 40% in setting of normal PET stress and chronic RV pacing who presents for CRT-D follow-up. Doing well. EF has normalized. Continue remote checks every 3 months. No AF. Follow-up in 6 months.     Thank you for allowing me to participate in the care of this patient. Please do not hesitate to call me with any questions or concerns.     Avelino Mejia MD, PhD  Cardiac Electrophysiology

## 2019-10-09 ENCOUNTER — CLINICAL SUPPORT (OUTPATIENT)
Dept: CARDIOLOGY | Facility: HOSPITAL | Age: 54
End: 2019-10-09
Payer: MEDICARE

## 2019-10-09 PROCEDURE — 93295 CARDIAC DEVICE CHECK - REMOTE: ICD-10-PCS | Mod: ,,, | Performed by: INTERNAL MEDICINE

## 2019-10-09 PROCEDURE — 93295 DEV INTERROG REMOTE 1/2/MLT: CPT | Mod: ,,, | Performed by: INTERNAL MEDICINE

## 2019-10-11 ENCOUNTER — PROCEDURE VISIT (OUTPATIENT)
Dept: NEUROLOGY | Facility: CLINIC | Age: 54
End: 2019-10-11
Payer: MEDICARE

## 2019-10-11 VITALS
BODY MASS INDEX: 50.02 KG/M2 | DIASTOLIC BLOOD PRESSURE: 83 MMHG | HEART RATE: 68 BPM | WEIGHT: 293 LBS | HEIGHT: 64 IN | SYSTOLIC BLOOD PRESSURE: 149 MMHG

## 2019-10-11 DIAGNOSIS — E66.01 OBESITY, CLASS III, BMI 40-49.9 (MORBID OBESITY): ICD-10-CM

## 2019-10-11 DIAGNOSIS — G43.711 CHRONIC MIGRAINE WITHOUT AURA, WITH INTRACTABLE MIGRAINE, SO STATED, WITH STATUS MIGRAINOSUS: Primary | ICD-10-CM

## 2019-10-11 DIAGNOSIS — E53.8 B12 DEFICIENCY: ICD-10-CM

## 2019-10-11 DIAGNOSIS — E55.9 VITAMIN D DEFICIENCY: ICD-10-CM

## 2019-10-11 PROCEDURE — 96372 THER/PROPH/DIAG INJ SC/IM: CPT | Mod: PBBFAC,59

## 2019-10-11 PROCEDURE — 64615 CHEMODENERV MUSC MIGRAINE: CPT | Mod: PBBFAC | Performed by: PSYCHIATRY & NEUROLOGY

## 2019-10-11 PROCEDURE — 64615 CHEMODENERV MUSC MIGRAINE: CPT | Mod: S$PBB,,, | Performed by: PSYCHIATRY & NEUROLOGY

## 2019-10-11 PROCEDURE — 64615 PR CHEMODENERVATION OF MUSCLE FOR CHRONIC MIGRAINE: ICD-10-PCS | Mod: S$PBB,,, | Performed by: PSYCHIATRY & NEUROLOGY

## 2019-10-11 RX ORDER — CYANOCOBALAMIN 1000 UG/ML
100 INJECTION, SOLUTION INTRAMUSCULAR; SUBCUTANEOUS
Status: COMPLETED | OUTPATIENT
Start: 2019-10-11 | End: 2019-10-11

## 2019-10-11 RX ADMIN — ONABOTULINUMTOXINA 200 UNITS: 100 INJECTION, POWDER, LYOPHILIZED, FOR SOLUTION INTRADERMAL; INTRAMUSCULAR at 11:10

## 2019-10-11 RX ADMIN — CYANOCOBALAMIN 100 MCG: 1000 INJECTION, SOLUTION INTRAMUSCULAR at 11:10

## 2019-10-11 NOTE — PROCEDURES
Follow up:   HA are more frequent   Taking 1600 mg motrin + zanaflex   Went to ER recently      Prior note:   HA overall stable in Hz and intensity   Reports a wearing off effect of BOTOX since last week   Taking zanaflex 8 mg TID        Prior note:   HA started 12/24   She feels BOTOX wore off last week   Reports GI distress from taking to many motrin      Prior note:   4/week HA - L vertex   Jabs - vertex either sides      She reports migraine that woke her up in the morning - associated with L side facial droop      Prior note:   She gets acceptable relief for 2.5 months after BOTOX      Prior note:   No recurrent stroke symptoms   starting having whole body tremors since this AM. Took 1 tablet of lorazepam   Last night had a HA, took trazodone       Admit Date: 4/11/2018  2:03 PM   Discharge Date and Time: 4/12/2018  8:30 PM   History of Present Illness: Ms. Chawla is a 53 yo F with afib on Eliquis (last dose this am), prior cardiac arrest with associated acute ischemic stroke with residual mild L sided weakness, CAD with ICD in situ, HTN, and HLD who presented with acute R sided weakness and sensation changes.  She originally called EMS for palpitations, but developed acute right sided facial droop and RUE weakness at 1:40pm today, after EMS had arrived.  She denies changes to speech or vision.  SBP en route 200/100.  She is ineligible for tPA 2/2 Eliquis use.  She is not suspected to have an LVO.  She will be admitted to [facility] for observation overnight.     Hospital Course (synopsis of major diagnoses, care, treatment, and services provided during the course of the hospital stay): Ms. Chawla was admitted for acute stroke workup. Pt with pacemaker so unable to obtain MRI Brain; CTA H/N demonstrated no evidence acute infarction, though did exhibit noncalcified plaquing of carotid bifurcations and proximal ICAs with < 50% stenosis, so Plavix was added to pt's daily home regimen. Concerned for TIA at this time  though Migraine very high on differential due to pt's hx headaches, including presently this admission. Hypertensive urgency/emergency also considered due to pt's very elevated levels with EMS. Per chart review, Eliquis was a new medication since 4/3/18 (had switched from Coumadin), however staff Faraz concerned that pt had a potential event while on Eliquis. She wished to switch to Pradaxa as an alternative DOAC (as it has an option for reversal), however changed to Xarelto per pt's insurance coverage.   While admitted, pt given cocktail for HA which she has received previously at the infusion clinic - IV Magnesium, IM Toradol, 2mg IV Morphine, 500cc NS bolus (IV Benadryl not included this time as pt had received a dose earlier that day prior to contrast imaging.) HA improved somewhat and pt was encouraged to follow up with Dr. Cortez for continued management of botox injections, etc. At that time, pt also found with hyponatremia; d/c'd Clorthalidone and plan was made for pt to [facility] 4[facility]16/18 for repeat CMP to evaluate Na level and BP check since discontinuing this medication.  Ms. Chawla was evaluated and treated by PT, OT, and SLP who recommend d/c with outpatient PT and OT. She was discharged home 4/12/18 with [facility] follow-up Monday      Prior note:   2 weeks ago BOTOX effect wore off   Used up all her percocet   3 wks h/o:   Reports frequent falls - falling forward, no LOC, no injuries, able to protect falls by hands   triggers - unknown   Also occ stumbling   Dragging L foot   No Pain in back or legs   No numbness or weakness in legs   No B/B incontinence   No lightheadedness      Prior note:    Flare up of TMJ and HA during daughter's wedding   NSAIDS helped   2 weeks ago BOTOX effect wore off      Prior note:   2 weeks ago BOTOX effect wore off   Has severe spasm and pain in the traps catalina   Weather change has provoked severe migraine + nausea   She started coumadin        Prior note:   3 weeks ago BOTOX effect wore off   She reports severe daily HA    During BOTOX periods she feels she  her HA. Much less intense      Prior note:   The patient is a 51-year-old female who [facility]. Since her last visit, she reports growing frustration   about the fact that nothing has helped her with regards to her headaches. She   continues to experience daily headaches. She takes mefenamic acid and   tizanidine every night, which only provides her two hours of relief. She is   unable to sleep because of the refractory headaches. She is incapacitated   because of severe headaches. She reports minimal relief with infusions that she  gets on a periodic basis. She does report an improvement overall with the   Botox. However, this effect has worn off in the last two weeks. She also   reports an episode wherein she fell with loss of consciousness, which was not   provoked. As a result, she injured her ankle and is currently wearing a boot.      Prior note:   since last week - Reports vertigo for 6 - 7 minutes upto 3 times daily   Nearly daily severe HA - no response to ponstel , compazine   She is late for her BOTOX - last 2 weeks were painful       Follow up:   R sided Headache is constant max at TMJ on R   Prior note:   She reports new episodes of R face tingling. Sh also reports 2 episodes of blurred vision lasting 15 minutes. These hapended while watching TV. Her pain remains unchanged   Follow up:   2 days of severe HA during Thanksgiving   Pounding bifrontal region   Pain worse over L eye brow   Follow up:   Reports bifrontal severe HA (worse on L) with no nausea with sudden onset . Occurs daily   NO note:  I found no papilledema or other changes to suggest elevated intracranial pressure or other alternative etiology for her headaches. Repeat exam in two years.  HA are less frequent and less intense.   (R levator scapula spasm)  "  symptoms better with lateral flexion to L   Prior note:   She still has daily HA with severe sharp stabbing pain behind L eye lasting for 15 sec   Prior note:   Daily pain. 2/week - nausea.  Shu Lares RN at 9/17/2014 4:53 PM  Pt presented to clinic for medical advice. Pt is seen by Dr. Paredes and Dr. Cortez.  1) She went ER on 9/13/14 for a migraine. She was given Reglan, Benadryl, MSO4 and IVF. A couple days later she developed an all over body "tremor". She states "I think I have Parkinson's". - Per Dr. Paredes, medication related tremor (Reglan & Benadryl). Informed her it should wear off in a few days. If not, she is to call and let us know.  2) Headaches - triggered by insomnia.   Current meds:  Ketoprofen - she took it once and said her "throat closed up" and she's not supposed to have anything with aspirin in it.  Nortriptyline - she was taking it at night, but it didn't help her sleep, so she stopped it.  Per Dr. Cortez, discontinue Ketoprofen and Nortriptyline. Start Effexor XR 37.5mg QD, tizanidine 4mg BID PRN headache and Klonopin 0.25 - 0.5mg QHS PRN. Will schedule pt for sphenocath procedure within the next week.   Prior note:   46 yo woman with history of HTN and asthma, both reportedly controlled, in hospital early 2013 after "passed out" and became unresponsive. CPR in the field for 8 minutes until EMS arrived. Per ED note, on arrival she was found to be in PEA, given epinephrine. Vfib/Vtach was achieved which was shocked x1. Patient converted to sinus tachycardia after shock. I do not know the time course for the above treatment. On arrival to facility patient was in sinus tachycardia with strong pulses, intubated and unresponsive. ET tube placement was confirmed with direct laryngoscopy and good bilateral breath sounds were heard after the tube was pulled back 2 cm. Reported to have "withdrawal" movements in ER during stabilization, then sedated and cooling protocol initiated. Had " "remarkable   recovery and first seen in clinic in 2013.   Headache history: cluster   Age of onset -    Location - behind L eye   Nature of pain - sharp   Prodrome - no   Aura - No   Duration of headache - 6-7 min   Time to peak intensity -   Pain scale - range of intensity - 9/10   Frequency - daily   Status Migrainosus history - 1-3 days   Time of day of most headaches- anytime   Associated symptoms with the headache:   Red eyes   swelling of L face   Headache history: Migraine   Age of onset -    Location - bifrontal   Nature of pain - Pounding   Prodrome - no   Aura - No   Duration of headache - > 4 hrs   Time to peak intensity -   Pain scale - range of intensity - 9/10   Frequency - 5/week   Status Migrainosus history - 1-3 days   Time of day of most headaches- anytime   Associated symptoms with the headache:   Meningeal symptoms - photophobia, phonophobia, exercise intolerance +   Nausea/vomitting +   Nasal drainage   Visual blurriness +   Pallor/flushing   Dizziness   Vertigo   Confusion   Impaired concentration +   Pain worsened with physical activity +   Neck pain +   Symptoms of increased intracranial pressure:   Whooshing sounds - no   Visual spots/blotches - no   Headache Triggers: unknown   Other comorbid conditions:   Anxiety - no   Motion sickness symptom - no       Treatment history:   Resolution of headache with sleep - yes       Meds tried:   Trigger points involvin/9/15 helped for 1 day   Site: Right   Levator scapula   Pharmacological agent:   0.5% Sensorcaine solution   No complications were noted.  Supraorbital block - helped for 2 days   Tylenol - not help   imitrex - chest tightness   alleve - help   topamax - not helping   Neurontin - not helping   Paxil, Elavil - not help   seroquel - nightmare   Ketoprofen - she took it once and said her "throat closed up" and she's not supposed to have anything with aspirin in it.  Nortriptyline - she was taking it at night, but it " didn't help her sleep, so she stopped it.  reglan - parkinsonism   zanaflex - help   Toradol 30 mg IM inj at home - too expensive   BOTOX round #1 9/3/15 - helped (R levator scapula spasm)   Round #2 12/3/15 - helped   Round#3 3/316 - helped   Round #4 6/1/16 - helped   BOTOX#5 9/15/16 - helped     BOTOX#6 - 11/30/16 - helped   BOTOX#7 - 3/9/17 - helped    BOTOX#8 - 5/25/17 - helped    BOTOX#9 8/10/17 - helped   BOTOX#10 10/19/17 - helped   BOTOX#11 12/27/17 - helped   BOTOX#12 3/7/18 - helped   BOTOX#13 5/16/18 - helped    BOTOX#14 8/1/18 - helped     BOTOX#15 10/19/18 - helped      BOTOX#16 12/28/18 - helped   BOTOX#17 3/13/19 - helped    BOTOX#18 5/23/19 - helped     BOTOX#19 8/1/19 - helped      AIMOVIG (sept 1rst week, Oct first week, Nov first wk 140 mg, Dec first wk 140 mg, Jan, Feb) no benefit   Constipation +      Can not do RFA - pt has pacemaker   Procedure: IOVERA peripheral nerve focus cold therapy (non ablative cryotherapy) 12/30/15 - not help   Indication: Cryotherapy of select peripheral nerves of the head was performed to treat chronic headaches that failed to respond to several preventive pharmacological therapies.   Sedation: None   Informed consent: The procedure, risks, benefits and options were discussed with the patient. There are no contraindications to the procedure. The patient expressed understanding and agreed to the procedure. I verify that I personally obtained the patient's consent prior to the start of the procedure.  Location:  Bilateral Supra orbital nerve (3 cycles on R and 1 cycle on L)   Bilateral Occipital nerve   3 cycles   Pain relief: Pain score reduced 10/10->0/10   9/26 Bilateral Sphenopalatine Ganglion and bilateral Maxillary Branch (Trigeminal Nerve) Block - no help   ONB - not help                              llily Pagan RN at 12/31/2014 3:54 PM                    Status: Signed                                Expand All Collapse All   HEADACHE FASTTRACK  PAIN 7/10  NAUSEA 0/10  ROUND 1: TORADOL, IMITREX, MORPHINE, BENADRYL, AND MAGNESIUM  PAIN 7/10 NAUSEA 0/10  PT STATED SHE WAS READY TO GO HOME AND GO TO SLEEP. PT D/C'ED IN [facility]                       Shannon Pagan RN at 10/5/2015 12:49 PM                    Status: Signed                                Expand All Collapse All   HEADACHE FASTTRACK  PAIN 8/10 NAUSEA 10/10  FIRST ROUND: tORADOL, BENADRYL, COMPAZINE, IMITREX, MAGNESIUM, AND VALPROATE.  PAIN 1/10 NAUSEA 0/10  PT READY TO GO HOME AND FEELING BETTER. PT LEFT IN NAD WITH FAMILY MEMBER  TOTAL TIME: 7716-1113                  Shannon Pagan RN at 10/20/2015 3:05 PM                    Status: Signed                                Expand All Collapse All   HEADACHE FASTTRACK  PAIN 10/10 NAUSEA 0/10  FIRST ROUND: tORADOL, BENADRYL, COMPAZINE, IMITREX, MAGNESIUM, AND VALPROATE.  BP BEING MONITORED EVERY 15 MIN AND REMAINED 170'S/80-90'S. DR CHOPRA NOTIFIED AND STATED TO MAKE SURE BP DID NOT EXCEED 180 SYSTOLIC OR PT SHOULD REPORT TO ED. BP AT 1500 183/92. DR CHOPRA NOTIFIED AND GAVE ORDER TO STOP INFUSION AND SEND PATIENT TO ED. PT BROUGHT TO ED BY INFUSION NURSE VIA WHEELCHAIR.   PAIN 8/10 NAUSEA 0/10  TOTAL TIME: 8093-8356                                       Progress Notes                                        Shannon Pagan RN at 2/24/2016 1:36 PM       Status: Signed                  Expand All Collapse All   HEADACHE FASTTRACK  PAIN 10/10 NAUSEA 7/10  FIRST ROUND: tORADOL, BENADRYL, AND MORPHINE-BP RANGING FROM 177-203/84-92, HR 60-82  MD NOTIFIED AND GAVE ORDERS TO SEND TO ED. PT LEFT VIA W/C WITH INFUSION NURSE ON WAY TO ED.            Headache risk factors:   H/o TBI - [facility] (2013) + neck sprain   H/o Meningitis - no   F/h Aneurysms - no       Review of Systems   Constitutional: Negative.   HENT: Negative.   Eyes: Negative.   Respiratory: Negative.   Cardiovascular: Negative.   Gastrointestinal: Negative.   Endocrine: Negative.   Genitourinary: Negative.    Musculoskeletal: Negative.   Skin: Negative.   Allergic/Immunologic: Negative.   Hematological: Negative.   Psychiatric/Behavioral: insomnia      Objective:     Physical Exam   Constitutional: She is oriented to person, place, and time. She appears well-developed.   obesity   Psychiatric: She has a normal mood and flat affect. Her behavior is normal. Judgment and thought content normal.   Headache Exam:  Optic disc is flat OU   Temples appear symmetric  No V1,V2,V3 distribution allodynia/hyperalgesia catalina   No facial swelling  No gross TMJ abnormalities   No ptosis   No conj injection   No meningismus   No neck stiffness  No C spine tenderness  Cervical facet tenderness on palpation catalina   No tender points over trapezium   catalina supraorbital nerve exit point tenderness  catalina occipital nerve exit point tenderness  No auriculotemporal nerve tenderness   Tenderness of levator scapula catalina      Gait - wide based gait   Tremor in hands, legs, voice and neck              ssessment:            1.  Occipital neuralgia            2.  Cervicalgia            3.  4  5  6 Chronic migraine without aura, with intractable migraine, so stated, with status migrainosus (post traumatic) post concussive?  Depression   TMJ - R sided   Insomnia - SHIRA      Head CT  Findings: There is no evidence of acute or recent major vascular territory cerebral infarction, parenchymal hemorrhage, or intra-axial mass.  There are no extra-axial masses or abnormal fluid collections.  The ventricular system is within normal limits of size for age and shows no distortion by mass-effect or evidence of hydrocephalus.  There is no fracture or destructive osseous lesion.  The extracranial soft tissues are unremarkable.  The visualized paranasal sinuses and mastoid air cells are clear.   Impression    No acute intracranial abnormality.  ______________________________________     Electronically signed by resident: KRISSY MAYERS MD  Date: 03/14/16  Time: 17:09                                                                        Plan:            1. Prophylactic medication -   Despiramine 25 mg AM (for dizziness)   Cymbalta 120 mg daily   Aricept 20 mg daily   Neurontin 900 mg TID    Magnesium 200 mg daily  Topamax 200 mg BID   Cont B2, B6 supplements   2, Breakthrough headache - zanaflex 8 mg Q8, flurbiprofen   PRN percocet Q=30  Multiple alternative treatment options were offered to the patient   3. Refrain from over counter pain medications. Discussed medication overuse headache.cont Magnesium 400 mg PO BID   4. Occipital neuralgia - If medical management is ineffective may consider occipital nerve blocks.   5. I again urged the patient to keep a headache calender.    f/up Dr. Rock for TBI    B12 injection - 5/25/17, 12/28/18, 5/23/19  Vit D supplements    f/up sleep clinic - CPAP pending      Pain cream      Cont AJOVY (April 2019- ) No side effects      ONB 2 weeks prior to next BOTOX     BOTOX was performed as an indicated therapy for intractable chronic migraine headaches given that the patient failed > 5 prophylactic medications  Botulinum Toxin Injection Procedure   Pre-operative diagnosis: Chronic migraine   Post-operative diagnosis: Same   Procedure: Chemical neurolysis   After risks and benefits were explained including bleeding, infection, worsening of pain, damage to the areas being injected, weakness of muscles, loss of muscle control, dysphagia if injecting the head or neck, facial droop if injecting the facial area, painful injection, allergic or other reaction to the medications being injected, and the failure of the procedure to help the problem, a signed consent was obtained.   The patient was placed in a comfortable area and the sites to be treated were identified.The area to be treated was prepped three times with alcohol and the alcohol allowed to dry. Next, a 30 gauge needle was used to inject the medication in the area to be treated.   Area(s)  injected:   Total Botox used: 175 Units   Botox wastage: 25 Units   Injection sites:    muscle bilaterally ( a total of 10 units divided into 2 sites)   Procerus muscle (5 units)   Frontalis muscle bilaterally (a total of 20 units divided into 4 sites)   Temporalis muscle bilaterally (a total of 50 units divided into 8 sites) + 2 sites   Occipitalis muscle bilaterally (a total of 30 units divided into 6 sites)   Cervical paraspinal muscles (a total of 20 units divided into 4 sites)   Trapezius muscle bilaterally (a total of 30 units divided into 6 sites)   Masseter 5 units catalina      Complications: none       RTC for the next Botox injection: 10 weeks

## 2019-10-11 NOTE — NURSING
Patient given vitamin B 12 injection per order of Dr Cortez-1,000 mcg in left deltoid.2 Patient identifiers and allergies reviewed.Site clear.Band aid applied.Patient waited with Nursing present for 5 minutes-she refused to wait longer,as her lyft ride was here.No untoward reaction noted.

## 2019-10-27 ENCOUNTER — OFFICE VISIT (OUTPATIENT)
Dept: URGENT CARE | Facility: CLINIC | Age: 54
End: 2019-10-27
Payer: MEDICARE

## 2019-10-27 VITALS
SYSTOLIC BLOOD PRESSURE: 174 MMHG | DIASTOLIC BLOOD PRESSURE: 81 MMHG | WEIGHT: 293 LBS | RESPIRATION RATE: 20 BRPM | OXYGEN SATURATION: 100 % | HEART RATE: 71 BPM | TEMPERATURE: 98 F | BODY MASS INDEX: 50.02 KG/M2 | HEIGHT: 64 IN

## 2019-10-27 DIAGNOSIS — S39.011A STRAIN OF ABDOMINAL MUSCLE, INITIAL ENCOUNTER: Primary | ICD-10-CM

## 2019-10-27 PROCEDURE — 99214 OFFICE O/P EST MOD 30 MIN: CPT | Mod: S$GLB,,, | Performed by: PHYSICIAN ASSISTANT

## 2019-10-27 PROCEDURE — 99214 PR OFFICE/OUTPT VISIT, EST, LEVL IV, 30-39 MIN: ICD-10-PCS | Mod: S$GLB,,, | Performed by: PHYSICIAN ASSISTANT

## 2019-10-27 RX ORDER — TIZANIDINE 2 MG/1
2 TABLET ORAL EVERY 8 HOURS PRN
Qty: 15 TABLET | Refills: 0 | Status: SHIPPED | OUTPATIENT
Start: 2019-10-27 | End: 2019-11-01

## 2019-10-27 NOTE — PATIENT INSTRUCTIONS
- Rest.    - Drink plenty of fluids.    - Acetaminophen (tylenol) as directed as needed for fever/pain.  Do not exceed more than 4000 mg of Tylenol daily.  Avoid tylenol if you have a history of liver disease. Do not take ibuprofen if you have a history of GI bleeding, kidney disease, or if you take blood thinners.     -you can take tizanidine as prescribed for muscle pain.  This is a muscle relaxer that can help with tension of the muscles to help reduce pain. This can also help with muscle spasm. This medicine may cause drowsiness.  Do not drive for operate heavy machinery while on this medication.     - Apply warm compresses/heating pad on low heat to the affected area for 20 minutes at a time.    - Follow up with your PCP or specialty clinic as directed in the next 1-2 weeks if not improved or as needed.  You can call [phone] to schedule an appointment with the appropriate provider.    - Go to the ER or seek medical attention immediately if you develop new or worsening symptoms.    - You must understand that you have received an Urgent Care treatment only and that you may be released before all of your medical problems are known or treated.   - You, the patient, will arrange for follow up care as instructed.   - If your condition worsens or fails to improve we recommend that you receive another evaluation at the ER immediately or contact your PCP to discuss your concerns or return here.     Elevated Blood Pressure  Your blood pressure was elevated during your visit to the urgent care.  It was not so high that immediate care was needed but it is recommended that you monitor your blood pressure over the next week or two to make sure that it is not staying elevated.  Please have your blood pressure taken 2-3 times daily at different times of the day.  Write all of those blood pressures down and record the time that they were taken.  Keep all that information and take it with you to see your Primary Care  Physician.  If your blood pressure is consistently above 140/90 you will need to follow up with your PCP more quickly      Muscle Strain in the Abdomen  A muscle strain is a stretching or tearing of the muscle fibers. It is also called a pulled muscle. The abdomen is protected by a thick wall of muscle in the front and sides. These muscles help with twisting and bending forward. Too much coughing, lifting heavy objects, or sudden jerking movements can sometimes cause a muscle strain in the abdomen. This causes pain that is worse when you move. The area may also feel tender or look swollen and bruised.  Home care  · Apply an ice pack over the injured area for 15 to 20 minutes every 3 to 6 hours. You should do this for the first 24 to 48 hours. You can make an ice pack by filling a plastic bag that seals at the top with ice cubes and then wrapping it with a thin towel. Be careful not to injure your skin with the ice treatments. Ice should never be applied directly to skin. Continue the use of ice packs for relief of pain and swelling as needed. After 48 hours, apply heat (warm shower or warm bath) for 15 to 20 minutes several times a day, or alternate ice and heat.  · You may use over-the-counter pain medicine to control pain, unless another pain medicine was prescribed. If you have liver or kidney disease, a stomach ulcer or GI bleeding, talk with your healthcare provider before using these medicines.  Follow-up care  Follow up with your healthcare provider, or as advised.  Call 911  Call 911 if you have:  · Weakness, lightheaded, or faint  · Chest pain  When to seek medical advice  Call your healthcare provider right away if any of these occur:  · Pain gets worse or moves to the right lower abdomen, just below the waistline  · Fever of 100.4°F (38°C) or above lasting for 24 to 48 hours  · Vomiting  · Severe abdominal pain that spreads to the back or toward the groin  · Blood in the urine  · Unexpected vaginal  bleeding in women  Date Last Reviewed: 11/19/2015  © 5236-9511 [employer]. [address] [place] [address], [place] [address]. All rights reserved. This information is not intended as a substitute for professional medical care. Always follow your healthcare professional's instructions.

## 2019-10-27 NOTE — PROGRESS NOTES
"Subjective:       Patient ID: Amna Chawla is a 54 y.o. female.    Vitals:  height is 5' 4" (1.626 m) and weight is 134.5 kg (296 lb 9.6 oz). Her temperature is 97.9 °F (36.6 °C). Her blood pressure is 174/81 (abnormal) and her pulse is 71. Her respiration is 20 and oxygen saturation is 100%.     Chief Complaint: Muscle Pain    Patient presents with left-sided abdominal pain that began after she tried to  her 2-year-old niece which caused left-sided abdominal pain.  No abdominal trauma.  No nausea, vomiting, changes in bowel movements, or fever.  Abdominal surgeries include hernia repair.  New previous hernia at this spot.  She states that pain is elicited with palpation, twisting, and certain movements.  Last bowel movement was today and was normal.    Muscle Pain   This is a new problem. The current episode started in the past 7 days. The problem occurs constantly. The problem has been unchanged. Associated symptoms include abdominal pain. Pertinent negatives include no arthralgias, chest pain, chills, congestion, coughing, diaphoresis, fatigue, fever, headaches, joint swelling, myalgias, nausea, rash, sore throat, vertigo, vomiting or weakness. Nothing aggravates the symptoms. She has tried nothing for the symptoms. The treatment provided no relief.       Constitution: Negative for chills, sweating, fatigue, fever and unexpected weight change.   HENT: Negative for congestion and sore throat.    Neck: Negative for painful lymph nodes.   Cardiovascular: Negative for chest pain and leg swelling.   Eyes: Negative for double vision and blurred vision.   Respiratory: Negative for chest tightness, cough, sputum production, bloody sputum, COPD, shortness of breath and stridor.    Gastrointestinal: Positive for abdominal pain. Negative for abdominal trauma, abdominal bloating, history of abdominal surgery, nausea, vomiting, constipation, diarrhea, bright red blood in stool, dark colored stools, rectal bleeding, " rectal pain, hemorrhoids, heartburn and bowel incontinence.   Genitourinary: Negative for dysuria, frequency, urgency and history of kidney stones.   Musculoskeletal: Negative for joint pain, joint swelling, muscle cramps and muscle ache.   Skin: Negative for color change, pale, rash and bruising.   Allergic/Immunologic: Negative for seasonal allergies.   Neurological: Negative for dizziness, history of vertigo, light-headedness, passing out and headaches.   Hematologic/Lymphatic: Negative for swollen lymph nodes.   Psychiatric/Behavioral: Negative for nervous/anxious, sleep disturbance and depression. The patient is not nervous/anxious.        Objective:      Physical Exam   Constitutional: She is oriented to person, place, and time. She appears well-developed and well-nourished.   HENT:   Head: Normocephalic and atraumatic.   Right Ear: External ear normal.   Left Ear: External ear normal.   Nose: Nose normal.   Mouth/Throat: Mucous membranes are normal.   Eyes: Conjunctivae and lids are normal.   Neck: Trachea normal and full passive range of motion without pain. Neck supple.   Cardiovascular: Normal rate, regular rhythm and normal heart sounds.   Pulmonary/Chest: Effort normal and breath sounds normal. No accessory muscle usage. No tachypnea and no bradypnea. No respiratory distress.   Abdominal: Soft. Normal appearance and bowel sounds are normal. She exhibits no shifting dullness, no distension, no abdominal bruit, no pulsatile midline mass and no mass. There is no hepatosplenomegaly. There is tenderness in the left upper quadrant. There is no rigidity, no rebound, no guarding, no CVA tenderness, no tenderness at McBurney's point and negative Chamorro's sign. No hernia.       Musculoskeletal: Normal range of motion. She exhibits no edema.   Neurological: She is alert and oriented to person, place, and time. She has normal strength.   Skin: Skin is warm, dry, intact, not diaphoretic and not pale.   Psychiatric:  She has a normal mood and affect. Her speech is normal and behavior is normal. Judgment and thought content normal. Cognition and memory are normal.   Nursing note and vitals reviewed.        Assessment:       1. Strain of abdominal muscle, initial encounter        Plan:         Strain of abdominal muscle, initial encounter  -     tiZANidine (ZANAFLEX) 2 MG tablet; Take 1 tablet (2 mg total) by mouth every 8 (eight) hours as needed.  Dispense: 15 tablet; Refill: 0      Patient Instructions   - Rest.    - Drink plenty of fluids.    - Acetaminophen (tylenol) as directed as needed for fever/pain.  Do not exceed more than 4000 mg of Tylenol daily.  Avoid tylenol if you have a history of liver disease. Do not take ibuprofen if you have a history of GI bleeding, kidney disease, or if you take blood thinners.     -you can take tizanidine as prescribed for muscle pain.  This is a muscle relaxer that can help with tension of the muscles to help reduce pain. This can also help with muscle spasm. This medicine may cause drowsiness.  Do not drive for operate heavy machinery while on this medication.     - Apply warm compresses/heating pad on low heat to the affected area for 20 minutes at a time.    - Follow up with your PCP or specialty clinic as directed in the next 1-2 weeks if not improved or as needed.  You can call [phone] to schedule an appointment with the appropriate provider.    - Go to the ER or seek medical attention immediately if you develop new or worsening symptoms.    - You must understand that you have received an Urgent Care treatment only and that you may be released before all of your medical problems are known or treated.   - You, the patient, will arrange for follow up care as instructed.   - If your condition worsens or fails to improve we recommend that you receive another evaluation at the ER immediately or contact your PCP to discuss your concerns or return here.     Elevated Blood  Pressure  Your blood pressure was elevated during your visit to the urgent care.  It was not so high that immediate care was needed but it is recommended that you monitor your blood pressure over the next week or two to make sure that it is not staying elevated.  Please have your blood pressure taken 2-3 times daily at different times of the day.  Write all of those blood pressures down and record the time that they were taken.  Keep all that information and take it with you to see your Primary Care Physician.  If your blood pressure is consistently above 140/90 you will need to follow up with your PCP more quickly      Muscle Strain in the Abdomen  A muscle strain is a stretching or tearing of the muscle fibers. It is also called a pulled muscle. The abdomen is protected by a thick wall of muscle in the front and sides. These muscles help with twisting and bending forward. Too much coughing, lifting heavy objects, or sudden jerking movements can sometimes cause a muscle strain in the abdomen. This causes pain that is worse when you move. The area may also feel tender or look swollen and bruised.  Home care  · Apply an ice pack over the injured area for 15 to 20 minutes every 3 to 6 hours. You should do this for the first 24 to 48 hours. You can make an ice pack by filling a plastic bag that seals at the top with ice cubes and then wrapping it with a thin towel. Be careful not to injure your skin with the ice treatments. Ice should never be applied directly to skin. Continue the use of ice packs for relief of pain and swelling as needed. After 48 hours, apply heat (warm shower or warm bath) for 15 to 20 minutes several times a day, or alternate ice and heat.  · You may use over-the-counter pain medicine to control pain, unless another pain medicine was prescribed. If you have liver or kidney disease, a stomach ulcer or GI bleeding, talk with your healthcare provider before using these medicines.  Follow-up  care  Follow up with your healthcare provider, or as advised.  Call 911  Call 911 if you have:  · Weakness, lightheaded, or faint  · Chest pain  When to seek medical advice  Call your healthcare provider right away if any of these occur:  · Pain gets worse or moves to the right lower abdomen, just below the waistline  · Fever of 100.4°F (38°C) or above lasting for 24 to 48 hours  · Vomiting  · Severe abdominal pain that spreads to the back or toward the groin  · Blood in the urine  · Unexpected vaginal bleeding in women  Date Last Reviewed: 11/19/2015  © 7959-5783 [employer]. [address] [place] [address], [place] [address]. All rights reserved. This information is not intended as a substitute for professional medical care. Always follow your healthcare professional's instructions.

## 2019-10-30 ENCOUNTER — OFFICE VISIT (OUTPATIENT)
Dept: CARDIOLOGY | Facility: CLINIC | Age: 54
End: 2019-10-30
Payer: MEDICARE

## 2019-10-30 VITALS
SYSTOLIC BLOOD PRESSURE: 121 MMHG | OXYGEN SATURATION: 97 % | HEIGHT: 64 IN | BODY MASS INDEX: 50.02 KG/M2 | HEART RATE: 67 BPM | WEIGHT: 293 LBS | DIASTOLIC BLOOD PRESSURE: 64 MMHG

## 2019-10-30 DIAGNOSIS — I44.2 COMPLETE AV BLOCK: ICD-10-CM

## 2019-10-30 DIAGNOSIS — I10 ESSENTIAL HYPERTENSION: Chronic | ICD-10-CM

## 2019-10-30 DIAGNOSIS — Z95.810 BIVENTRICULAR AUTOMATIC IMPLANTABLE CARDIOVERTER DEFIBRILLATOR IN SITU: ICD-10-CM

## 2019-10-30 DIAGNOSIS — Z86.73 HISTORY OF CVA (CEREBROVASCULAR ACCIDENT): Primary | ICD-10-CM

## 2019-10-30 DIAGNOSIS — K21.9 GASTROESOPHAGEAL REFLUX DISEASE WITHOUT ESOPHAGITIS: ICD-10-CM

## 2019-10-30 DIAGNOSIS — E78.2 MIXED HYPERLIPIDEMIA: ICD-10-CM

## 2019-10-30 DIAGNOSIS — I42.8 NONISCHEMIC CARDIOMYOPATHY: ICD-10-CM

## 2019-10-30 DIAGNOSIS — G47.33 OSA (OBSTRUCTIVE SLEEP APNEA): ICD-10-CM

## 2019-10-30 DIAGNOSIS — I48.0 PAROXYSMAL ATRIAL FIBRILLATION: ICD-10-CM

## 2019-10-30 DIAGNOSIS — Z86.74 HISTORY OF SUDDEN CARDIAC ARREST: ICD-10-CM

## 2019-10-30 PROCEDURE — 99214 OFFICE O/P EST MOD 30 MIN: CPT | Mod: S$PBB,,, | Performed by: INTERNAL MEDICINE

## 2019-10-30 PROCEDURE — 99999 PR PBB SHADOW E&M-EST. PATIENT-LVL III: CPT | Mod: PBBFAC,,, | Performed by: INTERNAL MEDICINE

## 2019-10-30 PROCEDURE — 99999 PR PBB SHADOW E&M-EST. PATIENT-LVL III: ICD-10-PCS | Mod: PBBFAC,,, | Performed by: INTERNAL MEDICINE

## 2019-10-30 PROCEDURE — 99213 OFFICE O/P EST LOW 20 MIN: CPT | Mod: PBBFAC | Performed by: INTERNAL MEDICINE

## 2019-10-30 PROCEDURE — 99214 PR OFFICE/OUTPT VISIT, EST, LEVL IV, 30-39 MIN: ICD-10-PCS | Mod: S$PBB,,, | Performed by: INTERNAL MEDICINE

## 2019-10-30 RX ORDER — PANTOPRAZOLE SODIUM 40 MG/1
40 TABLET, DELAYED RELEASE ORAL DAILY
Qty: 90 TABLET | Refills: 3 | Status: SHIPPED | OUTPATIENT
Start: 2019-10-30 | End: 2020-12-08 | Stop reason: SDUPTHER

## 2019-10-30 NOTE — LETTER
October 30, 2019      Tiffany Mina MD  [address]  [place] [address]           [facility] - Cardiology  [address]  [place] [address]  Phone: [phone]          Patient: Amna Chawla   MR Number: [id number]   YOB: 1965   Date of Visit: 10/30/2019       Dear Dr. Sterling:    Thank you for referring Amna Chawla to me for evaluation. Attached you will find relevant portions of my assessment and plan of care.    If you have questions, please do not hesitate to call me. I look forward to following Amna Chawla along with you.    Sincerely,    Rin Martins MD    Enclosure  CC:  No Recipients    If you would like to receive this communication electronically, please contact [email or web address] or [phone] to request more information on [employer] access.    For providers and/or their staff who would like to refer a patient to [facility], please contact us through our one-stop-shop provider referral line, [facility] , at [phone].    If you feel you have received this communication in error or would no longer like to receive these types of communications, please e-mail [email or web address]

## 2019-10-30 NOTE — PROGRESS NOTES
"Subjective:   Patient ID:  Amna Chawla is a 54 y.o. female who presents for follow-up of Chest Pain      HPI: She has been overall doing well. She did report one episode of a "chest spasm" last month which occurred while doing laundry and lasted a few minutes. She saw her PCP who did an ECG which showed a paced rhythm. It has not recurred. Her last ischemia evaluation was a PET stress 3/17 which had no focal defects. Amna Chawla denies any  shortness of breath, PND, orthopnea, palpitations, leg edema, or syncope. Her losartan was recently increased for elevated blood pressure readings. Her most recent echo was 9/18 and showed her LVEF normalizied to 55-60% following her CRT-D upgrade. She has cut out sodas from her diet and has lost about 13 lbs.    ECG (10/8/19): Atrial sensed bi-V paced.      Past Medical History:   Diagnosis Date    Anticoagulant long-term use     Anxiety     Asthma     Atrial fibrillation     Brain anoxic injury     Cervicalgia 8/28/2014    CHI (closed head injury) 2/19/2013    Convulsion 5/30/2015    Decreased ROM of left shoulder 4/12/2017    Defibrillator activation 2013    Depression     Heart block     History of sudden cardiac arrest 2/2013    PEA arrest with subsequent long-QT    Hx of psychiatric care     Hypertension     Left atrial enlargement 4/11/2018    Pacemaker 2013    Paresthesia 11/1/2013    Prolonged Q-T interval on ECG 2/8/2013    Psychiatric problem     Seizures     Sleep difficulties     Stroke     weakness lt side    Therapy     Thyroid disease     Upper airway resistance syndrome 2/21/2017       Past Surgical History:   Procedure Laterality Date    breast reduction      CARDIAC DEFIBRILLATOR PLACEMENT      CARDIAC DEFIBRILLATOR PLACEMENT      CARPAL TUNNEL RELEASE Right     COLONOSCOPY N/A 5/7/2019    Procedure: COLONOSCOPY;  Surgeon: Juan Coffey MD;  Location: [facility] ([address] [place] [address]);  Service: Endoscopy;  Laterality: N/A;  per  " Robby Pt to have balloon with DR. Coffey due to excesive looping, could not reach cecum - see telephone encounter 3/8/19 and last procedure report dated 6/24/14/ Per Piedad schedule as 90 min case- ERW  pacemaker/AICD Boitronik/   per , ok to hold Xareltox 2 days    HERNIA REPAIR      HIATAL HERNIA REPAIR      INSERT / REPLACE / REMOVE PACEMAKER  10/2017    CRT-D upgrade    TOTAL REDUCTION MAMMOPLASTY      TRIGGER FINGER RELEASE Left 8/2/2019    Procedure: RELEASE, TRIGGER FINGER left middle;  Surgeon: Matt Pugh Jr., MD;  Location: [facility];  Service: Plastics;  Laterality: Left;    TUBAL LIGATION         Social History     Socioeconomic History    Marital status: Single     Spouse name: Not on file    Number of children: Not on file    Years of education: Not on file    Highest education level: Not on file   Occupational History     Employer: [employer]   Social Needs    Financial resource strain: Not on file    Food insecurity:     Worry: Not on file     Inability: Not on file    Transportation needs:     Medical: Not on file     Non-medical: Not on file   Tobacco Use    Smoking status: Never Smoker    Smokeless tobacco: Never Used   Substance and Sexual Activity    Alcohol use: No    Drug use: No    Sexual activity: Not Currently   Lifestyle    Physical activity:     Days per week: Not on file     Minutes per session: Not on file    Stress: Not on file   Relationships    Social connections:     Talks on phone: Not on file     Gets together: Not on file     Attends Yazdanism service: Not on file     Active member of club or organization: Not on file     Attends meetings of clubs or organizations: Not on file     Relationship status: Not on file   Other Topics Concern    Patient feels they ought to cut down on drinking/drug use Not Asked    Patient annoyed by others criticizing their drinking/drug use Not Asked    Patient has felt bad or guilty about drinking/drug use  Not Asked    Patient has had a drink/used drugs as an eye opener in the AM Not Asked    Are you pregnant or think you may be? Not Asked    Breast-feeding Not Asked   Social History Narrative    Now on disability       Family History   Problem Relation Age of Onset    Hypertension Mother     COPD Unknown     Heart failure Unknown     Glaucoma Maternal Grandmother     Glaucoma Paternal Grandmother        Patient's Medications   New Prescriptions    No medications on file   Previous Medications    ARIPIPRAZOLE (ABILIFY) 15 MG TAB    Take 1 tablet (15 mg total) by mouth once daily.    BACLOFEN (LIORESAL) 20 MG TABLET    Take 1 tablet (20 mg total) by mouth 3 (three) times daily.    BLOOD SUGAR DIAGNOSTIC STRP    1 strip by Misc.(Non-Drug; Combo Route) route 2 (two) times daily.    BLOOD-GLUCOSE METER KIT    Please provide with insurance covered meter    CARVEDILOL (COREG) 25 MG TABLET    TAKE 1 TABLET(25 MG) BY MOUTH TWICE DAILY WITH MEALS    CLONAZEPAM (KLONOPIN) 1 MG TABLET    Take 1 tablet (1 mg total) by mouth daily as needed for Anxiety.    CLOPIDOGREL (PLAVIX) 75 MG TABLET    TAKE 1 TABLET BY MOUTH ONCE DAILY    CYANOCOBALAMIN, VITAMIN B-12, 1,000 MCG SUBL    Place 1,000 mcg under the tongue once daily.    DONEPEZIL (ARICEPT) 10 MG TABLET    Take 1 tablet (10 mg total) by mouth 2 (two) times daily.    DULOXETINE (CYMBALTA) 60 MG CAPSULE    Take 1 capsule (60 mg total) by mouth 2 (two) times daily.    ERGOCALCIFEROL (ERGOCALCIFEROL) 50,000 UNIT CAP    Take 1 capsule (50,000 Units total) by mouth twice a week.    FLURBIPROFEN (ANSAID) 100 MG TABLET    Take 1 tablet (100 mg total) by mouth 2 (two) times daily as needed (migraine).    FREMANEZUMAB-VFRM (AJOVY) 225 MG/1.5 ML INJECTION    Inject 225 mg into the skin every 28 days.    GABAPENTIN (NEURONTIN) 300 MG CAPSULE    Take 3 capsules (900 mg total) by mouth 3 (three) times daily.    HYDROCODONE-ACETAMINOPHEN (NORCO) 5-325 MG PER TABLET    Take 1 tablet by  mouth every 6 (six) hours as needed for Pain.    HYDROXYZINE (ATARAX) 50 MG TABLET    Take 1 tablet (50 mg total) by mouth nightly as needed.    HYDROXYZINE HCL (ATARAX) 10 MG TAB    Take 1 tablet (10 mg total) by mouth 3 (three) times daily as needed (anxiety).    LANCETS MISC    1 Device by Misc.(Non-Drug; Combo Route) route 2 (two) times daily.    LORAZEPAM (ATIVAN) 0.5 MG TABLET    Take 1 tablet (0.5 mg total) by mouth every 6 (six) hours as needed for Anxiety.    LOSARTAN (COZAAR) 100 MG TABLET    Take 1 tablet (100 mg total) by mouth once daily.    MAGNESIUM 200 MG TAB    Take 200 mg by mouth once daily.    METFORMIN (GLUCOPHAGE) 500 MG TABLET    Take 1 tablet (500 mg total) by mouth every evening for 7 days, THEN 1 tablet (500 mg total) 2 (two) times daily with meals.    ONDANSETRON (ZOFRAN-ODT) 4 MG TBDL    Take 1 tablet (4 mg total) by mouth every 12 (twelve) hours as needed (nausea).    RIVAROXABAN (XARELTO) 20 MG TAB    Take 1 tablet (20 mg total) by mouth daily with dinner or evening meal.    ROSUVASTATIN (CRESTOR) 40 MG TAB    TAKE 1 TABLET(40 MG) BY MOUTH EVERY EVENING    SILVER SULFADIAZINE 1% (SILVADENE) 1 % CREAM    Apply topically 2 (two) times daily.    TIAGABINE (GABITRIL) 4 MG TABLET    Take 1 tablet (4 mg total) by mouth every evening.    TIZANIDINE (ZANAFLEX) 2 MG TABLET    Take 1 tablet (2 mg total) by mouth every 8 (eight) hours as needed.    TRAZODONE (DESYREL) 100 MG TABLET    Take 1 or 1.5 tablets by mouth before bed as needed for insomnia    TRUE METRIX GLUCOSE METER MISC    TEST BG BID    VARICELLA-ZOSTER GE-AS01B, PF, (SHINGRIX, PF,) 50 MCG/0.5 ML INJECTION    Inject into the muscle.   Modified Medications    Modified Medication Previous Medication    PANTOPRAZOLE (PROTONIX) 40 MG TABLET pantoprazole (PROTONIX) 40 MG tablet       Take 1 tablet (40 mg total) by mouth once daily.    Take 1 tablet (40 mg total) by mouth once daily.   Discontinued Medications    No medications on file  "      Review of Systems   Constitution: Positive for weight loss. Negative for malaise/fatigue and weight gain.   HENT: Negative for hearing loss.    Eyes: Negative for visual disturbance.   Cardiovascular: Negative for chest pain, claudication, dyspnea on exertion, leg swelling, near-syncope, orthopnea, palpitations, paroxysmal nocturnal dyspnea and syncope.   Respiratory: Negative for cough, shortness of breath, sleep disturbances due to breathing, snoring and wheezing.    Endocrine: Negative for cold intolerance, heat intolerance, polydipsia, polyphagia and polyuria.   Hematologic/Lymphatic: Negative for bleeding problem. Does not bruise/bleed easily.   Skin: Negative for rash and suspicious lesions.   Musculoskeletal: Negative for arthritis, falls, joint pain, muscle weakness and myalgias.   Gastrointestinal: Negative for abdominal pain, change in bowel habit, constipation, diarrhea, heartburn, hematochezia, melena and nausea.   Genitourinary: Negative for hematuria and nocturia.   Neurological: Negative for excessive daytime sleepiness, dizziness, headaches, light-headedness, loss of balance and weakness.   Psychiatric/Behavioral: Negative for depression. The patient is not nervous/anxious.    Allergic/Immunologic: Negative for environmental allergies.       /64 (BP Location: Left arm, Patient Position: Sitting, BP Method: Large (Automatic))   Pulse 67   Ht 5' 4" (1.626 m)   Wt 135.2 kg (298 lb 1 oz)   LMP 02/04/2016 (Approximate)   SpO2 97%   BMI 51.16 kg/m²     Objective:   Physical Exam   Constitutional: She is oriented to person, place, and time. She appears well-developed and well-nourished.        HENT:   Head: Normocephalic and atraumatic.   Mouth/Throat: Oropharynx is clear and moist.   Eyes: Pupils are equal, round, and reactive to light. Conjunctivae and EOM are normal. No scleral icterus.   Neck: Normal range of motion. Neck supple. No hepatojugular reflux and no JVD present. No tracheal " deviation present. No thyromegaly present.   Cardiovascular: Normal rate, regular rhythm, normal heart sounds and intact distal pulses. PMI is not displaced.   Pulses:       Carotid pulses are 2+ on the right side, and 2+ on the left side.       Radial pulses are 2+ on the right side, and 2+ on the left side.        Dorsalis pedis pulses are 2+ on the right side, and 2+ on the left side.        Posterior tibial pulses are 2+ on the right side, and 2+ on the left side.   Pulmonary/Chest: Effort normal and breath sounds normal.   Abdominal: Soft. Bowel sounds are normal. She exhibits no distension and no mass. There is no hepatosplenomegaly. There is no tenderness.   Musculoskeletal: She exhibits no edema or tenderness.   Lymphadenopathy:     She has no cervical adenopathy.   Neurological: She is alert and oriented to person, place, and time.   Skin: Skin is warm and dry. No rash noted. No cyanosis or erythema. Nails show no clubbing.   Psychiatric: She has a normal mood and affect. Her speech is normal and behavior is normal.       Lab Results   Component Value Date     09/17/2019    K 4.1 09/17/2019     09/17/2019    CO2 24 09/17/2019    BUN 17 09/17/2019    CREATININE 0.9 09/17/2019    GLU 77 09/17/2019    HGBA1C 6.4 (H) 09/17/2019    MG 1.9 09/29/2018    AST 10 09/17/2019    ALT 23 09/17/2019    ALBUMIN 3.1 (L) 09/17/2019    PROT 9.2 (H) 09/17/2019    BILITOT 0.3 09/17/2019    WBC 5.97 09/17/2019    HGB 11.2 (L) 09/17/2019    HCT 33.6 (L) 09/17/2019    MCV 89 09/17/2019     09/17/2019    INR 1.1 09/29/2018    TSH 0.553 01/18/2019    CHOL 119 (L) 05/17/2019    HDL 40 05/17/2019    LDLCALC 65.4 05/17/2019    TRIG 68 05/17/2019    BNP 11 02/02/2014       Assessment:     1. History of CVA (cerebrovascular accident) : Continue plavix and rosuvastatin.   2. Biventricular automatic implantable cardioverter defibrillator in situ    3. Complete AV block    4. Essential hypertension : Blood pressure is at  goal. I have made no changes. Continue current regimen.   5. History of sudden cardiac arrest    6. Mixed hyperlipidemia : Lipids are at goal. Continue statin therapy.   7. Paroxysmal atrial fibrillation : On xarelto. Follows with Dr. Mejia.   8. Nonischemic cardiomyopathy from RV pacing, resolved with upgrade cardiac resynchronization therapy : LVEF has recovered. Continue carvedilol and losartan.   9. SHIRA (obstructive sleep apnea) : No longer on cpap.           Plan:     Amna was seen today for chest pain.    Diagnoses and all orders for this visit:    History of CVA (cerebrovascular accident)    Biventricular automatic implantable cardioverter defibrillator in situ    Complete AV block    Essential hypertension    History of sudden cardiac arrest    Mixed hyperlipidemia  -     Basic metabolic panel; Future  -     CBC auto differential; Future    Paroxysmal atrial fibrillation  -     Basic metabolic panel; Future  -     CBC auto differential; Future    Nonischemic cardiomyopathy from RV pacing, resolved with upgrade cardiac resynchronization therapy    SHIRA (obstructive sleep apnea)    Gastroesophageal reflux disease without esophagitis  -     pantoprazole (PROTONIX) 40 MG tablet; Take 1 tablet (40 mg total) by mouth once daily.        Thank you for allowing me to participate in this patient's care. Please do not hesitate to contact me with any questions or concerns.

## 2019-10-31 ENCOUNTER — EXTERNAL CHRONIC CARE MANAGEMENT (OUTPATIENT)
Dept: PRIMARY CARE CLINIC | Facility: CLINIC | Age: 54
End: 2019-10-31
Payer: MEDICARE

## 2019-10-31 PROCEDURE — 99487 CPLX CHRNC CARE 1ST 60 MIN: CPT | Mod: S$PBB,,, | Performed by: PSYCHIATRY & NEUROLOGY

## 2019-10-31 PROCEDURE — 99487 PR COMPLX CHRON CARE MGMT, 1ST HR, PER MONTH: ICD-10-PCS | Mod: S$PBB,,, | Performed by: PSYCHIATRY & NEUROLOGY

## 2019-10-31 PROCEDURE — 99487 CPLX CHRNC CARE 1ST 60 MIN: CPT | Mod: PBBFAC | Performed by: PSYCHIATRY & NEUROLOGY

## 2019-11-04 ENCOUNTER — PATIENT MESSAGE (OUTPATIENT)
Dept: INTERNAL MEDICINE | Facility: CLINIC | Age: 54
End: 2019-11-04

## 2019-11-07 ENCOUNTER — PATIENT MESSAGE (OUTPATIENT)
Dept: INTERNAL MEDICINE | Facility: CLINIC | Age: 54
End: 2019-11-07

## 2019-11-09 ENCOUNTER — OFFICE VISIT (OUTPATIENT)
Dept: URGENT CARE | Facility: CLINIC | Age: 54
End: 2019-11-09
Payer: MEDICARE

## 2019-11-09 VITALS
SYSTOLIC BLOOD PRESSURE: 178 MMHG | HEIGHT: 64 IN | DIASTOLIC BLOOD PRESSURE: 101 MMHG | HEART RATE: 64 BPM | TEMPERATURE: 98 F | OXYGEN SATURATION: 99 % | WEIGHT: 293 LBS | BODY MASS INDEX: 50.02 KG/M2 | RESPIRATION RATE: 20 BRPM

## 2019-11-09 DIAGNOSIS — L03.90 WOUND CELLULITIS: Primary | ICD-10-CM

## 2019-11-09 PROCEDURE — 99214 PR OFFICE/OUTPT VISIT, EST, LEVL IV, 30-39 MIN: ICD-10-PCS | Mod: S$GLB,,, | Performed by: FAMILY MEDICINE

## 2019-11-09 PROCEDURE — 99214 OFFICE O/P EST MOD 30 MIN: CPT | Mod: S$GLB,,, | Performed by: FAMILY MEDICINE

## 2019-11-09 RX ORDER — MUPIROCIN 20 MG/G
OINTMENT TOPICAL
Qty: 22 G | Refills: 1 | Status: SHIPPED | OUTPATIENT
Start: 2019-11-09 | End: 2021-09-16 | Stop reason: SDUPTHER

## 2019-11-09 RX ORDER — SULFAMETHOXAZOLE AND TRIMETHOPRIM 800; 160 MG/1; MG/1
1 TABLET ORAL 2 TIMES DAILY
Qty: 14 TABLET | Refills: 0 | Status: SHIPPED | OUTPATIENT
Start: 2019-11-09 | End: 2019-11-16

## 2019-11-09 NOTE — PATIENT INSTRUCTIONS
Discharge Instructions for Cellulitis  You have been diagnosed with cellulitis. This is an infection in the deepest layer of the skin. In some cases, the infection also affects the muscle. Cellulitis is caused by bacteria. The bacteria can enter the body through broken skin. This can happen with a cut, scratch, animal bite, or an insect bite that has been scratched. You may have been treated in the hospital with antibiotics and fluids. You will likely be given a prescription for antibiotics to take at home. This sheet will help you take care of yourself at home.  Home care  When you are home:  · Take the prescribed antibiotic medicine you are given as directed until it is gone. Take it even if you feel better. It treats the infection and stops it from returning. Not taking all the medicine can make future infections hard to treat.  · Keep the infected area clean.  · When possible, raise the infected area above the level of your heart. This helps keep swelling down.  · Talk with your healthcare provider if you are in pain. Ask what kind of over-the-counter medicine you can take for pain.  · Apply clean bandages as advised.  · Take your temperature once a day for a week.  · Wash your hands often to prevent spreading the infection.  In the future, wash your hands before and after you touch cuts, scratches, or bandages. This will help prevent infection.   When to call your healthcare provider  Call your healthcare provider immediately if you have any of the following:  · Difficulty or pain when moving the joints above or below the infected area  · Discharge or pus draining from the area  · Fever of 100.4°F (38°C) or higher, or as directed by your healthcare provider  · Pain that gets worse in or around the infected   · Redness that gets worse in or around the infected area, particularly if the area of redness expands to a wider area  · Shaking chills  · Swelling of the infected area  · Vomiting   Date Last Reviewed:  8/1/2016  © 1422-5686 [employer] [place]. [address] [place] [address], [place] [address]. All rights reserved. This information is not intended as a substitute for professional medical care. Always follow your healthcare professional's instructions.
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
## 2019-11-15 ENCOUNTER — PATIENT MESSAGE (OUTPATIENT)
Dept: INTERNAL MEDICINE | Facility: CLINIC | Age: 54
End: 2019-11-15

## 2019-11-17 ENCOUNTER — PATIENT MESSAGE (OUTPATIENT)
Dept: INTERNAL MEDICINE | Facility: CLINIC | Age: 54
End: 2019-11-17

## 2019-11-18 ENCOUNTER — PATIENT MESSAGE (OUTPATIENT)
Dept: INTERNAL MEDICINE | Facility: CLINIC | Age: 54
End: 2019-11-18

## 2019-11-20 ENCOUNTER — OFFICE VISIT (OUTPATIENT)
Dept: OPTOMETRY | Facility: CLINIC | Age: 54
End: 2019-11-20
Payer: MEDICARE

## 2019-11-20 ENCOUNTER — CLINICAL SUPPORT (OUTPATIENT)
Dept: AUDIOLOGY | Facility: CLINIC | Age: 54
End: 2019-11-20
Payer: MEDICARE

## 2019-11-20 ENCOUNTER — OFFICE VISIT (OUTPATIENT)
Dept: OTOLARYNGOLOGY | Facility: CLINIC | Age: 54
End: 2019-11-20
Payer: MEDICARE

## 2019-11-20 DIAGNOSIS — H52.223 MYOPIA OF BOTH EYES WITH REGULAR ASTIGMATISM AND PRESBYOPIA: ICD-10-CM

## 2019-11-20 DIAGNOSIS — H52.13 MYOPIA OF BOTH EYES WITH REGULAR ASTIGMATISM AND PRESBYOPIA: ICD-10-CM

## 2019-11-20 DIAGNOSIS — H91.90 PERCEIVED HEARING LOSS: Primary | ICD-10-CM

## 2019-11-20 DIAGNOSIS — E11.9 DIABETES MELLITUS TYPE 2 WITHOUT RETINOPATHY: Primary | ICD-10-CM

## 2019-11-20 DIAGNOSIS — H61.21 IMPACTED CERUMEN OF RIGHT EAR: ICD-10-CM

## 2019-11-20 DIAGNOSIS — H16.223 KERATOCONJUNCTIVITIS SICCA OF BOTH EYES NOT SPECIFIED AS SJOGREN'S: ICD-10-CM

## 2019-11-20 DIAGNOSIS — H52.4 MYOPIA OF BOTH EYES WITH REGULAR ASTIGMATISM AND PRESBYOPIA: ICD-10-CM

## 2019-11-20 DIAGNOSIS — H93.293 ABNORMAL AUDITORY PERCEPTION OF BOTH EARS: Primary | ICD-10-CM

## 2019-11-20 PROCEDURE — 99999 PR PBB SHADOW E&M-EST. PATIENT-LVL III: CPT | Mod: PBBFAC,,, | Performed by: NURSE PRACTITIONER

## 2019-11-20 PROCEDURE — 69210 EAR CERUMEN REMOVAL: ICD-10-PCS | Mod: S$PBB,,, | Performed by: NURSE PRACTITIONER

## 2019-11-20 PROCEDURE — 69210 REMOVE IMPACTED EAR WAX UNI: CPT | Mod: S$PBB,,, | Performed by: NURSE PRACTITIONER

## 2019-11-20 PROCEDURE — 92015 DETERMINE REFRACTIVE STATE: CPT | Mod: ,,, | Performed by: OPTOMETRIST

## 2019-11-20 PROCEDURE — 92556 SPEECH AUDIOMETRY COMPLETE: CPT | Mod: PBBFAC | Performed by: AUDIOLOGIST

## 2019-11-20 PROCEDURE — 92014 PR EYE EXAM, EST PATIENT,COMPREHESV: ICD-10-PCS | Mod: S$PBB,,, | Performed by: OPTOMETRIST

## 2019-11-20 PROCEDURE — 99214 OFFICE O/P EST MOD 30 MIN: CPT | Mod: PBBFAC,25 | Performed by: OPTOMETRIST

## 2019-11-20 PROCEDURE — 99999 PR PBB SHADOW E&M-EST. PATIENT-LVL III: ICD-10-PCS | Mod: PBBFAC,,, | Performed by: NURSE PRACTITIONER

## 2019-11-20 PROCEDURE — 99211 OFF/OP EST MAY X REQ PHY/QHP: CPT | Mod: PBBFAC,27 | Performed by: AUDIOLOGIST

## 2019-11-20 PROCEDURE — 92552 PURE TONE AUDIOMETRY AIR: CPT | Mod: PBBFAC | Performed by: AUDIOLOGIST

## 2019-11-20 PROCEDURE — 99213 OFFICE O/P EST LOW 20 MIN: CPT | Mod: PBBFAC,27 | Performed by: NURSE PRACTITIONER

## 2019-11-20 PROCEDURE — 99999 PR PBB SHADOW E&M-EST. PATIENT-LVL I: CPT | Mod: PBBFAC,,, | Performed by: AUDIOLOGIST

## 2019-11-20 PROCEDURE — 92014 COMPRE OPH EXAM EST PT 1/>: CPT | Mod: S$PBB,,, | Performed by: OPTOMETRIST

## 2019-11-20 PROCEDURE — 69210 REMOVE IMPACTED EAR WAX UNI: CPT | Mod: PBBFAC | Performed by: NURSE PRACTITIONER

## 2019-11-20 PROCEDURE — 99213 PR OFFICE/OUTPT VISIT, EST, LEVL III, 20-29 MIN: ICD-10-PCS | Mod: 25,S$PBB,, | Performed by: NURSE PRACTITIONER

## 2019-11-20 PROCEDURE — 99213 OFFICE O/P EST LOW 20 MIN: CPT | Mod: 25,S$PBB,, | Performed by: NURSE PRACTITIONER

## 2019-11-20 PROCEDURE — 92567 TYMPANOMETRY: CPT | Mod: PBBFAC | Performed by: AUDIOLOGIST

## 2019-11-20 PROCEDURE — 99999 PR PBB SHADOW E&M-EST. PATIENT-LVL I: ICD-10-PCS | Mod: PBBFAC,,, | Performed by: AUDIOLOGIST

## 2019-11-20 PROCEDURE — 92015 PR REFRACTION: ICD-10-PCS | Mod: ,,, | Performed by: OPTOMETRIST

## 2019-11-20 PROCEDURE — 99999 PR PBB SHADOW E&M-EST. PATIENT-LVL IV: ICD-10-PCS | Mod: PBBFAC,,, | Performed by: OPTOMETRIST

## 2019-11-20 PROCEDURE — 99999 PR PBB SHADOW E&M-EST. PATIENT-LVL IV: CPT | Mod: PBBFAC,,, | Performed by: OPTOMETRIST
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
## 2019-11-20 NOTE — PROGRESS NOTES
Amna Chawla was seen in the clinic today for an audiological evaluation.    Audiological testing revealed normal hearing sensitivity for the right ear and normal hearing sensitivity for the left ear.  A speech reception threshold was obtained at 15 dBHL for the right ear and at 15 dBHL for the left ear.  Speech discrimination was 96% for the right ear and 100% for the left ear.      Tympanometry testing revealed a Type A tympanogram for the right ear and a Type A tympanogram for the left ear.      Recommendations:  1. Otologic evaluation  2. Annual audiological evaluation  3. Hearing protection when in noise

## 2019-11-20 NOTE — PROGRESS NOTES
HPI     Patient here today for annual eye exam. Patient last seen on 3/7/2018.   Patient states that her VA has been fluctuating OD > OS. No other ocular   complaints at this time.     Hemoglobin A1C       Date                     Value               Ref Range             Status                09/17/2019               6.4 (H)             4.0 - 5.6 %           Final           05/21/2019               6.1 (H)             4.0 - 5.6 %           Final                  01/28/2019               6.1 (H)             4.0 - 5.6 %           Final             Last edited by Jamil Ross, OD on 11/20/2019  9:04 AM. (History)            Assessment /Plan     For exam results, see Encounter Report.    Diabetes mellitus type 2 without retinopathy  -No retinopathy noted today.  Continued control with primary care physician and annual comprehensive eye exam.    Keratoconjunctivitis sicca of both eyes not specified as Sjogren's  -Systane PRN    Myopia of both eyes with regular astigmatism and presbyopia  Eyeglass Final Rx     Eyeglass Final Rx       Sphere Cylinder Axis Dist VA Add    Right -2.00 +1.00 010 20/30++ +2.00    Left -1.25 +0.50 175 20/25++ +2.00    Type:  Bifocal    Expiration Date:  11/20/2020                  RTC 1 yr

## 2019-11-30 ENCOUNTER — EXTERNAL CHRONIC CARE MANAGEMENT (OUTPATIENT)
Dept: PRIMARY CARE CLINIC | Facility: CLINIC | Age: 54
End: 2019-11-30
Payer: MEDICARE

## 2019-11-30 PROCEDURE — 99490 CHRNC CARE MGMT STAFF 1ST 20: CPT | Mod: PBBFAC | Performed by: PSYCHIATRY & NEUROLOGY

## 2019-11-30 PROCEDURE — 99490 PR CHRONIC CARE MGMT, 1ST 20 MIN: ICD-10-PCS | Mod: S$PBB,,, | Performed by: PSYCHIATRY & NEUROLOGY

## 2019-11-30 PROCEDURE — 99490 CHRNC CARE MGMT STAFF 1ST 20: CPT | Mod: S$PBB,,, | Performed by: PSYCHIATRY & NEUROLOGY

## 2019-12-03 ENCOUNTER — CLINICAL SUPPORT (OUTPATIENT)
Dept: CARDIOLOGY | Facility: HOSPITAL | Age: 54
End: 2019-12-03
Payer: MEDICARE

## 2019-12-03 DIAGNOSIS — Z95.810 PRESENCE OF AUTOMATIC (IMPLANTABLE) CARDIAC DEFIBRILLATOR: ICD-10-CM

## 2019-12-03 PROCEDURE — 93295 CARDIAC DEVICE CHECK - REMOTE: ICD-10-PCS | Mod: ,,, | Performed by: INTERNAL MEDICINE

## 2019-12-03 PROCEDURE — 93295 DEV INTERROG REMOTE 1/2/MLT: CPT | Mod: ,,, | Performed by: INTERNAL MEDICINE

## 2019-12-04 DIAGNOSIS — I10 ESSENTIAL HYPERTENSION: ICD-10-CM

## 2019-12-04 DIAGNOSIS — I48.0 PAROXYSMAL ATRIAL FIBRILLATION: ICD-10-CM

## 2019-12-04 DIAGNOSIS — E78.2 MIXED HYPERLIPIDEMIA: ICD-10-CM

## 2019-12-04 DIAGNOSIS — I42.8 NONISCHEMIC CARDIOMYOPATHY: ICD-10-CM

## 2019-12-04 RX ORDER — ROSUVASTATIN CALCIUM 40 MG/1
TABLET, COATED ORAL
Qty: 90 TABLET | Refills: 0 | Status: SHIPPED | OUTPATIENT
Start: 2019-12-04 | End: 2020-02-28 | Stop reason: SDUPTHER

## 2019-12-05 ENCOUNTER — TELEPHONE (OUTPATIENT)
Dept: PHARMACY | Facility: CLINIC | Age: 54
End: 2019-12-05

## 2019-12-09 ENCOUNTER — PATIENT MESSAGE (OUTPATIENT)
Dept: INTERNAL MEDICINE | Facility: CLINIC | Age: 54
End: 2019-12-09

## 2019-12-09 DIAGNOSIS — G43.711 CHRONIC MIGRAINE WITHOUT AURA, WITH INTRACTABLE MIGRAINE, SO STATED, WITH STATUS MIGRAINOSUS: Primary | ICD-10-CM

## 2019-12-12 ENCOUNTER — PATIENT MESSAGE (OUTPATIENT)
Dept: PSYCHIATRY | Facility: CLINIC | Age: 54
End: 2019-12-12

## 2019-12-19 ENCOUNTER — PROCEDURE VISIT (OUTPATIENT)
Dept: NEUROLOGY | Facility: CLINIC | Age: 54
End: 2019-12-19
Payer: MEDICARE

## 2019-12-19 VITALS
DIASTOLIC BLOOD PRESSURE: 74 MMHG | SYSTOLIC BLOOD PRESSURE: 134 MMHG | HEART RATE: 69 BPM | BODY MASS INDEX: 51.15 KG/M2 | HEIGHT: 64 IN

## 2019-12-19 DIAGNOSIS — E66.01 OBESITY, CLASS III, BMI 40-49.9 (MORBID OBESITY): ICD-10-CM

## 2019-12-19 DIAGNOSIS — G43.711 CHRONIC MIGRAINE WITHOUT AURA, WITH INTRACTABLE MIGRAINE, SO STATED, WITH STATUS MIGRAINOSUS: Primary | ICD-10-CM

## 2019-12-19 PROCEDURE — 64615 CHEMODENERV MUSC MIGRAINE: CPT | Mod: PBBFAC | Performed by: PSYCHIATRY & NEUROLOGY

## 2019-12-19 PROCEDURE — 99499 NO LOS: ICD-10-PCS | Mod: S$PBB,,, | Performed by: PSYCHIATRY & NEUROLOGY

## 2019-12-19 PROCEDURE — 64615 PR CHEMODENERVATION OF MUSCLE FOR CHRONIC MIGRAINE: ICD-10-PCS | Mod: S$PBB,,, | Performed by: PSYCHIATRY & NEUROLOGY

## 2019-12-19 PROCEDURE — 64615 CHEMODENERV MUSC MIGRAINE: CPT | Mod: S$PBB,,, | Performed by: PSYCHIATRY & NEUROLOGY

## 2019-12-19 PROCEDURE — 99499 UNLISTED E&M SERVICE: CPT | Mod: S$PBB,,, | Performed by: PSYCHIATRY & NEUROLOGY

## 2019-12-19 RX ADMIN — ONABOTULINUMTOXINA 200 UNITS: 100 INJECTION, POWDER, LYOPHILIZED, FOR SOLUTION INTRADERMAL; INTRAMUSCULAR at 10:12

## 2019-12-19 NOTE — PROCEDURES
Follow up:   HA much improved   Only 2 since last visit     Prior note:   HA are more frequent   Taking 1600 mg motrin + zanaflex   Went to ER recently      Prior note:   HA overall stable in Hz and intensity   Reports a wearing off effect of BOTOX since last week   Taking zanaflex 8 mg TID        Prior note:   HA started 12/24   She feels BOTOX wore off last week   Reports GI distress from taking to many motrin      Prior note:   4/week HA - L vertex   Jabs - vertex either sides      She reports migraine that woke her up in the morning - associated with L side facial droop      Prior note:   She gets acceptable relief for 2.5 months after BOTOX      Prior note:   No recurrent stroke symptoms   starting having whole body tremors since this AM. Took 1 tablet of lorazepam   Last night had a HA, took trazodone       Admit Date: 4/11/2018  2:03 PM   Discharge Date and Time: 4/12/2018  8:30 PM   History of Present Illness: Ms. Chawla is a 53 yo F with afib on Eliquis (last dose this am), prior cardiac arrest with associated acute ischemic stroke with residual mild L sided weakness, CAD with ICD in situ, HTN, and HLD who presented with acute R sided weakness and sensation changes.  She originally called EMS for palpitations, but developed acute right sided facial droop and RUE weakness at 1:40pm today, after EMS had arrived.  She denies changes to speech or vision.  SBP en route 200/100.  She is ineligible for tPA 2/2 Eliquis use.  She is not suspected to have an LVO.  She will be admitted to  for observation overnight.     Hospital Course (synopsis of major diagnoses, care, treatment, and services provided during the course of the hospital stay): Ms. Chawla was admitted for acute stroke workup. Pt with pacemaker so unable to obtain MRI Brain; CTA H/N demonstrated no evidence acute infarction, though did exhibit noncalcified plaquing of carotid bifurcations and proximal ICAs with < 50% stenosis, so Plavix was added to  pt's daily home regimen. Concerned for TIA at this time though Migraine very high on differential due to pt's hx headaches, including presently this admission. Hypertensive urgency/emergency also considered due to pt's very elevated levels with EMS. Per chart review, Eliquis was a new medication since 4/3/18 (had switched from Coumadin), however staff Faraz concerned that pt had a potential event while on Eliquis. She wished to switch to Pradaxa as an alternative DOAC (as it has an option for reversal), however changed to Xarelto per pt's insurance coverage.   While admitted, pt given cocktail for HA which she has received previously at the infusion clinic - IV Magnesium, IM Toradol, 2mg IV Morphine, 500cc NS bolus (IV Benadryl not included this time as pt had received a dose earlier that day prior to contrast imaging.) HA improved somewhat and pt was encouraged to follow up with Dr. Cortez for continued management of botox injections, etc. At that time, pt also found with hyponatremia; d/c'd Clorthalidone and plan was made for pt to [facility] 4[facility]16/18 for repeat CMP to evaluate Na level and BP check since discontinuing this medication.  Ms. Chawla was evaluated and treated by PT, OT, and SLP who recommend d/c with outpatient PT and OT. She was discharged home 4/12/18 with [facility] follow-up Monday      Prior note:   2 weeks ago BOTOX effect wore off   Used up all her percocet   3 wks h/o:   Reports frequent falls - falling forward, no LOC, no injuries, able to protect falls by hands   triggers - unknown   Also occ stumbling   Dragging L foot   No Pain in back or legs   No numbness or weakness in legs   No B/B incontinence   No lightheadedness      Prior note:    Flare up of TMJ and HA during daughter's wedding   NSAIDS helped   2 weeks ago BOTOX effect wore off      Prior note:   2 weeks ago BOTOX effect wore off   Has severe spasm and pain in the traps catalina   Weather change has  provoked severe migraine + nausea   She started coumadin       Prior note:   3 weeks ago BOTOX effect wore off   She reports severe daily HA    During BOTOX periods she feels she  her HA. Much less intense      Prior note:   The patient is a 51-year-old female who [facility]. Since her last visit, she reports growing frustration   about the fact that nothing has helped her with regards to her headaches. She   continues to experience daily headaches. She takes mefenamic acid and   tizanidine every night, which only provides her two hours of relief. She is   unable to sleep because of the refractory headaches. She is incapacitated   because of severe headaches. She reports minimal relief with infusions that she  gets on a periodic basis. She does report an improvement overall with the   Botox. However, this effect has worn off in the last two weeks. She also   reports an episode wherein she fell with loss of consciousness, which was not   provoked. As a result, she injured her ankle and is currently wearing a boot.      Prior note:   since last week - Reports vertigo for 6 - 7 minutes upto 3 times daily   Nearly daily severe HA - no response to ponstel , compazine   She is late for her BOTOX - last 2 weeks were painful       Follow up:   R sided Headache is constant max at TMJ on R   Prior note:   She reports new episodes of R face tingling. Sh also reports 2 episodes of blurred vision lasting 15 minutes. These hapended while watching TV. Her pain remains unchanged   Follow up:   2 days of severe HA during Thanksgiving   Pounding bifrontal region   Pain worse over L eye brow   Follow up:   Reports bifrontal severe HA (worse on L) with no nausea with sudden onset . Occurs daily   NO note:  I found no papilledema or other changes to suggest elevated intracranial pressure or other alternative etiology for her headaches. Repeat exam in two years.  HA are less  "frequent and less intense.   (R levator scapula spasm)   symptoms better with lateral flexion to L   Prior note:   She still has daily HA with severe sharp stabbing pain behind L eye lasting for 15 sec   Prior note:   Daily pain. 2/week - nausea.  Shu Lares RN at 9/17/2014 4:53 PM  Pt presented to clinic for medical advice. Pt is seen by Dr. Paredes and Dr. Cortez.  1) She went ER on 9/13/14 for a migraine. She was given Reglan, Benadryl, MSO4 and IVF. A couple days later she developed an all over body "tremor". She states "I think I have Parkinson's". - Per Dr. Paredes, medication related tremor (Reglan & Benadryl). Informed her it should wear off in a few days. If not, she is to call and let us know.  2) Headaches - triggered by insomnia.   Current meds:  Ketoprofen - she took it once and said her "throat closed up" and she's not supposed to have anything with aspirin in it.  Nortriptyline - she was taking it at night, but it didn't help her sleep, so she stopped it.  Per Dr. Cortez, discontinue Ketoprofen and Nortriptyline. Start Effexor XR 37.5mg QD, tizanidine 4mg BID PRN headache and Klonopin 0.25 - 0.5mg QHS PRN. Will schedule pt for sphenocath procedure within the next week.   Prior note:   48 yo woman with history of HTN and asthma, both reportedly controlled, in hospital early 2013 after "passed out" and became unresponsive. CPR in the field for 8 minutes until EMS arrived. Per ED note, on arrival she was found to be in PEA, given epinephrine. Vfib/Vtach was achieved which was shocked x1. Patient converted to sinus tachycardia after shock. I do not know the time course for the above treatment. On arrival to facility patient was in sinus tachycardia with strong pulses, intubated and unresponsive. ET tube placement was confirmed with direct laryngoscopy and good bilateral breath sounds were heard after the tube was pulled back 2 cm. Reported to have "withdrawal" movements in ER during stabilization, " "then sedated and cooling protocol initiated. Had remarkable   recovery and first seen in clinic in 2013.   Headache history: cluster   Age of onset -    Location - behind L eye   Nature of pain - sharp   Prodrome - no   Aura - No   Duration of headache - 6-7 min   Time to peak intensity -   Pain scale - range of intensity - 9/10   Frequency - daily   Status Migrainosus history - 1-3 days   Time of day of most headaches- anytime   Associated symptoms with the headache:   Red eyes   swelling of L face   Headache history: Migraine   Age of onset -    Location - bifrontal   Nature of pain - Pounding   Prodrome - no   Aura - No   Duration of headache - > 4 hrs   Time to peak intensity -   Pain scale - range of intensity - 9/10   Frequency - 5/week   Status Migrainosus history - 1-3 days   Time of day of most headaches- anytime   Associated symptoms with the headache:   Meningeal symptoms - photophobia, phonophobia, exercise intolerance +   Nausea/vomitting +   Nasal drainage   Visual blurriness +   Pallor/flushing   Dizziness   Vertigo   Confusion   Impaired concentration +   Pain worsened with physical activity +   Neck pain +   Symptoms of increased intracranial pressure:   Whooshing sounds - no   Visual spots/blotches - no   Headache Triggers: unknown   Other comorbid conditions:   Anxiety - no   Motion sickness symptom - no       Treatment history:   Resolution of headache with sleep - yes       Meds tried:   Trigger points involvin/9/15 helped for 1 day   Site: Right   Levator scapula   Pharmacological agent:   0.5% Sensorcaine solution   No complications were noted.  Supraorbital block - helped for 2 days   Tylenol - not help   imitrex - chest tightness   alleve - help   topamax - not helping   Neurontin - not helping   Paxil Elavil - not help   seroquel - nightmare   Ketoprofen - she took it once and said her "throat closed up" and she's not supposed to have anything with aspirin in " it.  Nortriptyline - she was taking it at night, but it didn't help her sleep, so she stopped it.  reglan - parkinsonism   zanaflex - help   Toradol 30 mg IM inj at home - too expensive   BOTOX round #1 9/3/15 - helped (R levator scapula spasm)   Round #2 12/3/15 - helped   Round#3 3/316 - helped   Round #4 6/1/16 - helped   BOTOX#5 9/15/16 - helped     BOTOX#6 - 11/30/16 - helped   BOTOX#7 - 3/9/17 - helped    BOTOX#8 - 5/25/17 - helped    BOTOX#9 8/10/17 - helped   BOTOX#10 10/19/17 - helped   BOTOX#11 12/27/17 - helped   BOTOX#12 3/7/18 - helped   BOTOX#13 5/16/18 - helped    BOTOX#14 8/1/18 - helped     BOTOX#15 10/19/18 - helped      BOTOX#16 12/28/18 - helped   BOTOX#17 3/13/19 - helped    BOTOX#18 5/23/19 - helped     BOTOX#19 8/1/19 - helped      BOTOX#20 10/11/19 - helped    AIMOVIG (sept 1rst week, Oct first week, Nov first wk 140 mg, Dec first wk 140 mg, Jan, Feb) no benefit   Constipation +      Can not do RFA - pt has pacemaker   Procedure: IOVERA peripheral nerve focus cold therapy (non ablative cryotherapy) 12/30/15 - not help   Indication: Cryotherapy of select peripheral nerves of the head was performed to treat chronic headaches that failed to respond to several preventive pharmacological therapies.   Sedation: None   Informed consent: The procedure, risks, benefits and options were discussed with the patient. There are no contraindications to the procedure. The patient expressed understanding and agreed to the procedure. I verify that I personally obtained the patient's consent prior to the start of the procedure.  Location:  Bilateral Supra orbital nerve (3 cycles on R and 1 cycle on L)   Bilateral Occipital nerve   3 cycles   Pain relief: Pain score reduced 10/10->0/10   9/26 Bilateral Sphenopalatine Ganglion and bilateral Maxillary Branch (Trigeminal Nerve) Block - no help   ONB - not help                                                                                          lilly Pagan RN  at 12/31/2014 3:54 PM                      Status: Signed                                  Expand All Collapse All   HEADACHE FASTTRACK  PAIN 7/10 NAUSEA 0/10  ROUND 1: TORADOL, IMITREX, MORPHINE, BENADRYL, AND MAGNESIUM  PAIN 7/10 NAUSEA 0/10  PT STATED SHE WAS READY TO GO HOME AND GO TO SLEEP. PT D/C'ED IN NAD                         Shannon Pagan RN at 10/5/2015 12:49 PM                      Status: Signed                                  Expand All Collapse All   HEADACHE FASTTRACK  PAIN 8/10 NAUSEA 10/10  FIRST ROUND: tORADOL, BENADRYL, COMPAZINE, IMITREX, MAGNESIUM, AND VALPROATE.  PAIN 1/10 NAUSEA 0/10  PT READY TO GO HOME AND FEELING BETTER. PT LEFT IN NAD WITH FAMILY MEMBER  TOTAL TIME: 5548-0077                    Shannon Pagan RN at 10/20/2015 3:05 PM                      Status: Signed                                  Expand All Collapse All   HEADACHE FASTTRACK  PAIN 10/10 NAUSEA 0/10  FIRST ROUND: tORADOL, BENADRYL, COMPAZINE, IMITREX, MAGNESIUM, AND VALPROATE.  BP BEING MONITORED EVERY 15 MIN AND REMAINED 170'S/80-90'S. DR CHOPRA NOTIFIED AND STATED TO MAKE SURE BP DID NOT EXCEED 180 SYSTOLIC OR PT SHOULD REPORT TO ED. BP AT 1500 183/92. DR CHOPRA NOTIFIED AND GAVE ORDER TO STOP INFUSION AND SEND PATIENT TO ED. PT BROUGHT TO ED BY INFUSION NURSE VIA WHEELCHAIR.   PAIN 8/10 NAUSEA 0/10  TOTAL TIME: 3172-0222                                           Progress Notes                                          Shannon Pagan RN at 2/24/2016 1:36 PM       Status: Signed                  Expand All Collapse All   HEADACHE FASTTRACK  PAIN 10/10 NAUSEA 7/10  FIRST ROUND: tORADOL, BENADRYL, AND MORPHINE-BP RANGING FROM 177-203/84-92, HR 60-82  MD NOTIFIED AND GAVE ORDERS TO SEND TO ED. PT LEFT VIA W/C WITH INFUSION NURSE ON WAY TO ED.            Headache risk factors:   H/o TBI - [facility] (2013) + neck sprain   H/o Meningitis - no   F/h Aneurysms - no       Review of Systems   Constitutional: Negative.   HENT: Negative.    Eyes: Negative.   Respiratory: Negative.   Cardiovascular: Negative.   Gastrointestinal: Negative.   Endocrine: Negative.   Genitourinary: Negative.   Musculoskeletal: Negative.   Skin: Negative.   Allergic/Immunologic: Negative.   Hematological: Negative.   Psychiatric/Behavioral: insomnia      Objective:     Physical Exam   Constitutional: She is oriented to person, place, and time. She appears well-developed.   obesity   Psychiatric: She has a normal mood and flat affect. Her behavior is normal. Judgment and thought content normal.   Headache Exam:  Optic disc is flat OU   Temples appear symmetric  No V1,V2,V3 distribution allodynia/hyperalgesia catalina   No facial swelling  No gross TMJ abnormalities   No ptosis   No conj injection   No meningismus   No neck stiffness  No C spine tenderness  Cervical facet tenderness on palpation catalina   No tender points over trapezium   catalina supraorbital nerve exit point tenderness  catalina occipital nerve exit point tenderness  No auriculotemporal nerve tenderness   Tenderness of levator scapula catalina      Gait - wide based gait   Tremor in hands, legs, voice and neck                              ssessment:              1.  Occipital neuralgia              2.  Cervicalgia              3.  4  5  6 Chronic migraine without aura, with intractable migraine, so stated, with status migrainosus (post traumatic) post concussive?  Depression   TMJ - R sided   Insomnia - SHIRA      Head CT  Findings: There is no evidence of acute or recent major vascular territory cerebral infarction, parenchymal hemorrhage, or intra-axial mass.  There are no extra-axial masses or abnormal fluid collections.  The ventricular system is within normal limits of size for age and shows no distortion by mass-effect or evidence of hydrocephalus.  There is no fracture or destructive osseous lesion.  The extracranial soft tissues are unremarkable.  The visualized paranasal sinuses and mastoid air cells are clear.    Impression    No acute intracranial abnormality.  ______________________________________     Electronically signed by resident: KRISSY MAYERS MD  Date: 03/14/16  Time: 17:09                                                                       Plan:              1. Prophylactic medication -   Despiramine 25 mg AM (for dizziness)   Cymbalta 120 mg daily   Aricept 20 mg daily   Neurontin 900 mg TID    Magnesium 200 mg daily  Topamax 200 mg BID   Cont B2, B6 supplements   2, Breakthrough headache - zanaflex 8 mg Q8, flurbiprofen   PRN percocet Q=30  Multiple alternative treatment options were offered to the patient   3. Refrain from over counter pain medications. Discussed medication overuse headache.cont Magnesium 400 mg PO BID   4. Occipital neuralgia - If medical management is ineffective may consider occipital nerve blocks.   5. I again urged the patient to keep a headache calender.    f/up Dr. Rock for TBI    B12 injection - 5/25/17, 12/28/18, 5/23/19  Vit D supplements    f/up sleep clinic - CPAP pending      Pain cream      Cont AJOVY (April 2019- ) No side effects      ONB 2 weeks prior to next BOTOX      BOTOX was performed as an indicated therapy for intractable chronic migraine headaches given that the patient failed > 5 prophylactic medications  Botulinum Toxin Injection Procedure   Pre-operative diagnosis: Chronic migraine   Post-operative diagnosis: Same   Procedure: Chemical neurolysis   After risks and benefits were explained including bleeding, infection, worsening of pain, damage to the areas being injected, weakness of muscles, loss of muscle control, dysphagia if injecting the head or neck, facial droop if injecting the facial area, painful injection, allergic or other reaction to the medications being injected, and the failure of the procedure to help the problem, a signed consent was obtained.   The patient was placed in a comfortable area and the sites to be treated were identified.The area  to be treated was prepped three times with alcohol and the alcohol allowed to dry. Next, a 30 gauge needle was used to inject the medication in the area to be treated.   Area(s) injected:   Total Botox used: 175 Units   Botox wastage: 25 Units   Injection sites:    muscle bilaterally ( a total of 10 units divided into 2 sites)   Procerus muscle (5 units)   Frontalis muscle bilaterally (a total of 20 units divided into 4 sites)   Temporalis muscle bilaterally (a total of 50 units divided into 8 sites) + 2 sites   Occipitalis muscle bilaterally (a total of 30 units divided into 6 sites)   Cervical paraspinal muscles (a total of 20 units divided into 4 sites)   Trapezius muscle bilaterally (a total of 30 units divided into 6 sites)   Masseter 5 units catalina      Complications: none       RTC for the next Botox injection: 10 weeks

## 2019-12-23 ENCOUNTER — TELEPHONE (OUTPATIENT)
Dept: INTERNAL MEDICINE | Facility: CLINIC | Age: 54
End: 2019-12-23

## 2019-12-23 NOTE — TELEPHONE ENCOUNTER
Spoke to pt---she tried Delsym OTC with no relief. Cough and congestion since 12/9. Scheduled  appt for tomorrow with Dr. Davenport.

## 2019-12-23 NOTE — TELEPHONE ENCOUNTER
----- Message from Meme Meneses sent at 12/23/2019 10:43 AM CST -----  Contact: self   Patient would like to get medical advice.  Symptoms (please be specific):  Cold with cough,  thick green mucus and hoarse  How long has patient had these symptoms:  12/9  Pharmacy name and phone # (copy/paste from chart):   [id number] - [place], [place] - [address] AT [facility] [phone] (Phone)  [phone] (Fax)  Any drug allergies (copy/paste from chart):    See chart  Would the patient rather a call back or a response via [facility]?:  Call back  Comments:

## 2019-12-24 ENCOUNTER — OFFICE VISIT (OUTPATIENT)
Dept: INTERNAL MEDICINE | Facility: CLINIC | Age: 54
End: 2019-12-24
Payer: MEDICARE

## 2019-12-24 VITALS
HEART RATE: 71 BPM | TEMPERATURE: 98 F | SYSTOLIC BLOOD PRESSURE: 136 MMHG | HEIGHT: 64 IN | BODY MASS INDEX: 50.02 KG/M2 | DIASTOLIC BLOOD PRESSURE: 84 MMHG | OXYGEN SATURATION: 98 % | WEIGHT: 293 LBS

## 2019-12-24 DIAGNOSIS — J20.9 ACUTE BRONCHITIS, UNSPECIFIED ORGANISM: Primary | ICD-10-CM

## 2019-12-24 DIAGNOSIS — G47.33 OBSTRUCTIVE SLEEP APNEA: ICD-10-CM

## 2019-12-24 PROCEDURE — 99213 PR OFFICE/OUTPT VISIT, EST, LEVL III, 20-29 MIN: ICD-10-PCS | Mod: S$PBB,,, | Performed by: INTERNAL MEDICINE

## 2019-12-24 PROCEDURE — 99999 PR PBB SHADOW E&M-EST. PATIENT-LVL V: CPT | Mod: PBBFAC,,, | Performed by: INTERNAL MEDICINE

## 2019-12-24 PROCEDURE — 99213 OFFICE O/P EST LOW 20 MIN: CPT | Mod: S$PBB,,, | Performed by: INTERNAL MEDICINE

## 2019-12-24 PROCEDURE — 99999 PR PBB SHADOW E&M-EST. PATIENT-LVL V: ICD-10-PCS | Mod: PBBFAC,,, | Performed by: INTERNAL MEDICINE

## 2019-12-24 PROCEDURE — 99215 OFFICE O/P EST HI 40 MIN: CPT | Mod: PBBFAC | Performed by: INTERNAL MEDICINE

## 2019-12-24 RX ORDER — DOXYCYCLINE 100 MG/1
100 CAPSULE ORAL 2 TIMES DAILY
Qty: 20 CAPSULE | Refills: 0 | Status: SHIPPED | OUTPATIENT
Start: 2019-12-24 | End: 2020-01-03

## 2019-12-24 NOTE — PROGRESS NOTES
"She is a 54-year-old lady who comes in today after having a cough productive of sputum for 2 weeks.  She has a extensive list of comorbidities including history of stroke, history of DVT, hyperlipidemia, she has a pacemaker, pre diabetes, morbid obesity, hypertension, insomnia, nonischemic cardiomyopathy, paroxysmal atrial fibrillation, and frequent falls.  She called her PCP and she was given instruction to try some [place] some.  She also complains of severe fatigue he for about a week.  She denies any fevers or body aches, but she has had some chills.  She denies any PND orthopnea.  No shortness of breath.  She has not had any sick contacts.  She does have obstructive sleep apnea and she says she had her CPAP repossessed because she was not using it.  This is about a month ago and does correspond to her fatigue.    She has had no recent weight loss.  No chest pains, no shortness of breath, no palpitations, her defibrillator has not gone off recently, she has generalized fatigue but no focalized body weakness anywhere.      /84 (BP Location: Right arm, Patient Position: Sitting, BP Method: Large (Manual))   Pulse 71   Temp 98 °F (36.7 °C) (Oral)   Ht 5' 4" (1.626 m)   Wt (!) 138.8 kg (306 lb)   LMP 02/04/2016 (Approximate)   SpO2 98%   BMI 52.52 kg/m²       She is a morbidly obese 54-year-old female but she seems older.  Her ear canals are open with only a small bit of wax TMs are clear.  Her chest is clear without wheeze, however asking her take a deep breath causes a violet episode of coughing.  Her cardiovascular exam is unremarkable.  Abdomen is soft and nontender. Lower extremity she does have edema bilateral lower extremities.  Overall she does not appear toxic but does appear chronically ill.      Assessment and Plan:  Bronchitis in a woman with multiple cor morbidities.  She has been taking cough medicine for about 2 weeks her symptoms have continued to worsen.  Under treated for bronchitis with " doxycycline.  The fatigued worries me that this might be related to her sleep apnea.  However she tells me that she is in ago she a shins to get her CPAP machine back.  And I would recommend that with all her comorbidities she does not need untreated sleep apnea.  We discussed continues [place] some.  If she worsens I recommend coming on into the emergency room or following up with her primary care doctor.

## 2019-12-31 ENCOUNTER — EXTERNAL CHRONIC CARE MANAGEMENT (OUTPATIENT)
Dept: PRIMARY CARE CLINIC | Facility: CLINIC | Age: 54
End: 2019-12-31
Payer: MEDICARE

## 2019-12-31 PROCEDURE — 99490 PR CHRONIC CARE MGMT, 1ST 20 MIN: ICD-10-PCS | Mod: S$PBB,,, | Performed by: PSYCHIATRY & NEUROLOGY

## 2019-12-31 PROCEDURE — 99490 CHRNC CARE MGMT STAFF 1ST 20: CPT | Mod: PBBFAC | Performed by: PSYCHIATRY & NEUROLOGY

## 2019-12-31 PROCEDURE — 99490 CHRNC CARE MGMT STAFF 1ST 20: CPT | Mod: S$PBB,,, | Performed by: PSYCHIATRY & NEUROLOGY

## 2020-01-03 DIAGNOSIS — I48.0 PAROXYSMAL ATRIAL FIBRILLATION: ICD-10-CM

## 2020-01-03 DIAGNOSIS — I10 ESSENTIAL HYPERTENSION: ICD-10-CM

## 2020-01-03 DIAGNOSIS — E78.2 MIXED HYPERLIPIDEMIA: ICD-10-CM

## 2020-01-03 DIAGNOSIS — I42.8 NONISCHEMIC CARDIOMYOPATHY: ICD-10-CM

## 2020-01-03 RX ORDER — CARVEDILOL 25 MG/1
TABLET ORAL
Qty: 180 TABLET | Refills: 3 | Status: SHIPPED | OUTPATIENT
Start: 2020-01-03 | End: 2021-02-01 | Stop reason: SDUPTHER

## 2020-01-06 ENCOUNTER — PATIENT OUTREACH (OUTPATIENT)
Dept: ADMINISTRATIVE | Facility: HOSPITAL | Age: 55
End: 2020-01-06

## 2020-01-06 NOTE — PROGRESS NOTES
Chart review completed. Pap & HPV results located in Care Everywhere, entered & linked to health maintenance.

## 2020-01-13 ENCOUNTER — TELEPHONE (OUTPATIENT)
Dept: CARDIOLOGY | Facility: HOSPITAL | Age: 55
End: 2020-01-13

## 2020-01-13 NOTE — TELEPHONE ENCOUNTER
"Pt contacted the [facility] on this afternoon with c/o some soreness in her "upper left breast" that she felt  this on today.  Pt had an ICD implanted in 2017.  Asked the patient if she recalls hitting that area on anything or any other type of contact in the area, was she lifting anything very heavy or doing any strenuous exercise.  Patient replied that she has not.  Confirmed with the pt that there was no swelling, redness, and/or broken skin @ or near the implant site.  Informed the patient she slept directly on that area and possibly aggravated a nerve.  Instructed the pt to contact the Clinic immediately should she develop any swelling and/or redness. Otherwise, should the soreness persists she should contact her PCP for further evaluation.  Pt verbalized understanding to all of the above.  "

## 2020-01-20 ENCOUNTER — OFFICE VISIT (OUTPATIENT)
Dept: INTERNAL MEDICINE | Facility: CLINIC | Age: 55
End: 2020-01-20
Payer: MEDICARE

## 2020-01-20 ENCOUNTER — TELEPHONE (OUTPATIENT)
Dept: PHARMACY | Facility: CLINIC | Age: 55
End: 2020-01-20

## 2020-01-20 VITALS
WEIGHT: 293 LBS | OXYGEN SATURATION: 98 % | DIASTOLIC BLOOD PRESSURE: 86 MMHG | HEART RATE: 77 BPM | HEIGHT: 64 IN | BODY MASS INDEX: 50.02 KG/M2 | SYSTOLIC BLOOD PRESSURE: 138 MMHG

## 2020-01-20 DIAGNOSIS — R73.03 PREDIABETES: ICD-10-CM

## 2020-01-20 DIAGNOSIS — I44.2 COMPLETE AV BLOCK: ICD-10-CM

## 2020-01-20 DIAGNOSIS — E55.9 VITAMIN D DEFICIENCY: ICD-10-CM

## 2020-01-20 DIAGNOSIS — R53.82 CHRONIC FATIGUE: ICD-10-CM

## 2020-01-20 DIAGNOSIS — I42.8 NONISCHEMIC CARDIOMYOPATHY: ICD-10-CM

## 2020-01-20 DIAGNOSIS — G43.711 CHRONIC MIGRAINE WITHOUT AURA, WITH INTRACTABLE MIGRAINE, SO STATED, WITH STATUS MIGRAINOSUS: ICD-10-CM

## 2020-01-20 DIAGNOSIS — G50.0 SUPRAORBITAL NEURALGIA: ICD-10-CM

## 2020-01-20 DIAGNOSIS — Z95.0 PACEMAKER: ICD-10-CM

## 2020-01-20 DIAGNOSIS — F06.8 COGNITIVE DEFICIT AS LATE EFFECT OF TRAUMATIC BRAIN INJURY: ICD-10-CM

## 2020-01-20 DIAGNOSIS — Z95.810 BIVENTRICULAR AUTOMATIC IMPLANTABLE CARDIOVERTER DEFIBRILLATOR IN SITU: ICD-10-CM

## 2020-01-20 DIAGNOSIS — I10 ESSENTIAL HYPERTENSION: Primary | Chronic | ICD-10-CM

## 2020-01-20 DIAGNOSIS — D51.9 ANEMIA DUE TO VITAMIN B12 DEFICIENCY, UNSPECIFIED B12 DEFICIENCY TYPE: ICD-10-CM

## 2020-01-20 DIAGNOSIS — M54.81 BILATERAL OCCIPITAL NEURALGIA: ICD-10-CM

## 2020-01-20 DIAGNOSIS — Z86.73 HISTORY OF CVA (CEREBROVASCULAR ACCIDENT): ICD-10-CM

## 2020-01-20 DIAGNOSIS — S06.9X0S COGNITIVE DEFICIT AS LATE EFFECT OF TRAUMATIC BRAIN INJURY: ICD-10-CM

## 2020-01-20 DIAGNOSIS — E78.2 MIXED HYPERLIPIDEMIA: ICD-10-CM

## 2020-01-20 DIAGNOSIS — F33.1 DEPRESSION, MAJOR, RECURRENT, MODERATE: ICD-10-CM

## 2020-01-20 DIAGNOSIS — E53.8 B12 DEFICIENCY: ICD-10-CM

## 2020-01-20 DIAGNOSIS — G47.33 OSA (OBSTRUCTIVE SLEEP APNEA): ICD-10-CM

## 2020-01-20 DIAGNOSIS — E04.1 THYROID NODULE: ICD-10-CM

## 2020-01-20 DIAGNOSIS — E66.01 OBESITY, CLASS III, BMI 40-49.9 (MORBID OBESITY): ICD-10-CM

## 2020-01-20 DIAGNOSIS — Z86.74 HISTORY OF SUDDEN CARDIAC ARREST: ICD-10-CM

## 2020-01-20 DIAGNOSIS — I48.0 PAROXYSMAL ATRIAL FIBRILLATION: ICD-10-CM

## 2020-01-20 PROCEDURE — 99999 PR PBB SHADOW E&M-EST. PATIENT-LVL IV: ICD-10-PCS | Mod: PBBFAC,,, | Performed by: INTERNAL MEDICINE

## 2020-01-20 PROCEDURE — 99214 PR OFFICE/OUTPT VISIT, EST, LEVL IV, 30-39 MIN: ICD-10-PCS | Mod: S$PBB,,, | Performed by: INTERNAL MEDICINE

## 2020-01-20 PROCEDURE — 99999 PR PBB SHADOW E&M-EST. PATIENT-LVL IV: CPT | Mod: PBBFAC,,, | Performed by: INTERNAL MEDICINE

## 2020-01-20 PROCEDURE — 99214 OFFICE O/P EST MOD 30 MIN: CPT | Mod: S$PBB,,, | Performed by: INTERNAL MEDICINE

## 2020-01-20 PROCEDURE — 99214 OFFICE O/P EST MOD 30 MIN: CPT | Mod: PBBFAC | Performed by: INTERNAL MEDICINE

## 2020-01-20 NOTE — PROGRESS NOTES
INTERNAL MEDICINE ESTABLISHED PATIENT VISIT NOTE    Subjective:     Chief Complaint: Follow-up  HTN, preDM     Patient ID: Amna Chawla is a 54 y.o. female with hx CVA and TIA c residual cognitive deficits (most recently c TIA 9/2018 per pt, has mild B weakness from several strokes in the past), carotid a disease, HTN, paroxysmal A fib c LAE, hx sudden cardiac arrest c VF and no ischemic heart disease and preserved EF (2013), intermittent 3rd deg AV block and symptomatic LVEF of 40% in setting of normal PET stress and chronic RV pacing s/p CRT-D, HLD, thyroid nodule s/p FNA 7/2018 c path c/w benign follicular nodule, depression with anxiety followed by psych, chronic complex h/a c occipital neuralgia followed by Neuro and on mult meds and has also had tx c botox, GERD c hiatal hernia, B carpal tunnel s/p release B, hx L middle ring finger trigger finger s/p release, SHIRA on APAP 6-20cm followed in sleep clinic, insomnia, prediabetes, last seen by me in Sept, here today for f/u.    At last visit in Sept, was referred back to Cards for c/o chest pain (EKG c paced rhythm), seen by Dr. Martins in Oct who started Pantoprazole.    Was also started on Metformin at that time due to worsening preDM.  Reports she has been taking meds s issues.    Was also restarted on B12 since levels low, and Ergocalciferol was increased to twice weekly dose since Vit D levels were suboptimal.  States she has been taking both as rx'ed but still feels tired and sometimes c memory issues.    Still seeing Dr. Cortez for h/a and getting Botox.  Was also seen in  in Nov for an infected burn to her forearm and in Dec by Dr. Davenport for brochitis and was rec to restart cpap for fatigue but states she the machine was previously taken away due to not using it when her arm was in a sling for shoulder issues.    Today c c/o R ring finger swelling for the past month and has appt c Dr. Pugh in [facility] on Friday.    Past Medical History:  Past  Medical History:   Diagnosis Date    Anticoagulant long-term use     Anxiety     Asthma     Atrial fibrillation     Brain anoxic injury     Cervicalgia 8/28/2014    CHI (closed head injury) 2/19/2013    Convulsion 5/30/2015    Decreased ROM of left shoulder 4/12/2017    Defibrillator activation 2013    Depression     Heart block     History of sudden cardiac arrest 2/2013    PEA arrest with subsequent long-QT    Hx of psychiatric care     Hypertension     Left atrial enlargement 4/11/2018    Pacemaker 2013    Paresthesia 11/1/2013    Prolonged Q-T interval on ECG 2/8/2013    Psychiatric problem     Seizures     Sleep difficulties     Stroke     weakness lt side    Therapy     Thyroid disease     Upper airway resistance syndrome 2/21/2017       Home Medications:  Prior to Admission medications    Medication Sig Start Date End Date Taking? Authorizing Provider   ARIPiprazole (ABILIFY) 15 MG Tab Take 1 tablet (15 mg total) by mouth once daily. 8/20/19   Kade Huffman III, NP   baclofen (LIORESAL) 20 MG tablet Take 1 tablet (20 mg total) by mouth 3 (three) times daily.  Patient taking differently: Take 20 mg by mouth 3 (three) times daily as needed.  5/21/19 5/20/20  Tiffany Mina MD   blood sugar diagnostic Strp 1 strip by Misc.(Non-Drug; Combo Route) route 2 (two) times daily. 5/20/19   Tiffany Mina MD   blood-glucose meter kit Please provide with insurance covered meter 5/20/19   Tiffany Mina MD   carvedilol (COREG) 25 MG tablet TAKE 1 TABLET BY MOUTH TWICE DAILY, WITH MEALS 1/3/20   Rin Martins MD   clonazePAM (KLONOPIN) 1 MG tablet Take 1 tablet (1 mg total) by mouth daily as needed for Anxiety. 8/20/19   Kade Huffman III, NP   clopidogrel (PLAVIX) 75 mg tablet TAKE 1 TABLET BY MOUTH ONCE DAILY 3/26/19   Perlita Ruth MD   cyanocobalamin, vitamin B-12, 1,000 mcg Subl Place 1,000 mcg under the tongue once daily. 9/18/19   Tiffany Mina MD   donepezil (ARICEPT) 10 MG tablet Take 1  tablet (10 mg total) by mouth 2 (two) times daily. 7/18/17   Toby Paredes MD   DULoxetine (CYMBALTA) 60 MG capsule Take 1 capsule (60 mg total) by mouth 2 (two) times daily. 8/20/19   Kade Huffman III, NP   ergocalciferol (ERGOCALCIFEROL) 50,000 unit Cap Take 1 capsule (50,000 Units total) by mouth twice a week. 9/19/19   Tiffany Mina MD   flurbiprofen (ANSAID) 100 MG tablet Take 1 tablet (100 mg total) by mouth 2 (two) times daily as needed (migraine). 10/1/19   David Cortez MD   fremanezumab-vfrm (AJOVY) 225 mg/1.5 mL injection Inject 225 mg into the skin every 28 days. 3/13/19   David Cortez MD   gabapentin (NEURONTIN) 300 MG capsule Take 3 capsules (900 mg total) by mouth 3 (three) times daily. 11/30/16   David Cortez MD   lancets Misc 1 Device by Misc.(Non-Drug; Combo Route) route 2 (two) times daily. 5/20/19   Tiffany Mina MD   losartan (COZAAR) 100 MG tablet Take 1 tablet (100 mg total) by mouth once daily. 7/23/19 7/22/20  Tiffany Mina MD   magnesium 200 mg Tab Take 200 mg by mouth once daily.  Patient taking differently: Take 200 mg by mouth every other day.  3/7/18   David Cortez MD   metFORMIN (GLUCOPHAGE) 500 MG tablet Take 1 tablet (500 mg total) by mouth every evening for 7 days, THEN 1 tablet (500 mg total) 2 (two) times daily with meals. 9/18/19 12/24/19  Tiffany Mina MD   mupirocin (BACTROBAN) 2 % ointment Apply to affected area 3 times daily 11/9/19   Uche Phillips MD   ondansetron (ZOFRAN-ODT) 4 MG TbDL Take 1 tablet (4 mg total) by mouth every 12 (twelve) hours as needed (nausea). 8/1/19   David Cortez MD   pantoprazole (PROTONIX) 40 MG tablet Take 1 tablet (40 mg total) by mouth once daily. 10/30/19   Rin Martins MD   rivaroxaban (XARELTO) 20 mg Tab Take 1 tablet (20 mg total) by mouth daily with dinner or evening meal. 1/10/19   Avelino Mejia MD   rosuvastatin (CRESTOR) 40 MG Tab TAKE 1 TABLET(40 MG) BY MOUTH EVERY EVENING 12/4/19   Rin Martins MD   silver  sulfADIAZINE 1% (SILVADENE) 1 % cream Apply topically 2 (two) times daily. 5/22/19   Tiffany Mina MD   tiaGABine (GABITRIL) 4 MG tablet Take 1 tablet (4 mg total) by mouth every evening. 7/17/19   David Cortez MD   traZODone (DESYREL) 100 MG tablet Take 1 or 1.5 tablets by mouth before bed as needed for insomnia 8/20/19   Kade Huffman III, NP   TRUE METRIX GLUCOSE METER Misc TEST BG BID 5/30/19   Historical Provider, MD   zolpidem (AMBIEN CR) 12.5 MG CR tablet TAKE 1 TABLET BY MOUTH NIGHTLY AS NEEDED FOR INSOMNIA 4/4/19 4/23/19  Kade Huffman III, NP       Allergies:  Review of patient's allergies indicates:   Allergen Reactions    Aspirin Hives    Imitrex [sumatriptan] Palpitations    Penicillins Hives and Swelling    Shellfish containing products Anaphylaxis     seafood    Percocet [oxycodone-acetaminophen] Itching     States can take    Reglan [metoclopramide hcl] Other (See Comments)     Parkinsonism        Social History:  Social History     Tobacco Use    Smoking status: Never Smoker    Smokeless tobacco: Never Used   Substance Use Topics    Alcohol use: No     Frequency: 2-4 times a month     Drinks per session: 1 or 2     Binge frequency: Never    Drug use: No        Review of Systems   Constitutional: Positive for fatigue. Negative for chills and fever.   Respiratory: Negative for cough, chest tightness and shortness of breath.    Cardiovascular: Negative for chest pain.   Gastrointestinal: Negative for abdominal pain and blood in stool.   Genitourinary: Negative for dysuria and frequency.         Health Maintenance:     Immunizations:   Influenza 10/2019  TDap 10/2/2018  Pneumovax 9/2018 here per pt  Shingrix 10/2019, repeat today.     Cancer Screening:  PAP: 9/2019 c Dr. Marroquin wnl  Mammogram:  10/2019 benign  Colonoscopy:  5/2019, polyps x2 removed, tubular adenoma dn tubulovillous adenoma on path c rec repeat in 3 yrs per report.    Objective:   /86 (BP Location: Left arm,  "Patient Position: Sitting, BP Method: Large (Manual))   Pulse 77   Ht 5' 4" (1.626 m)   Wt (!) 137.7 kg (303 lb 9.2 oz)   LMP 02/04/2016 (Approximate)   SpO2 98%   BMI 52.11 kg/m²        General: AAO x3, no apparent distress  HEENT: PERRL, OP clear  CV: RRR, no m/r/g  Pulm: Lungs CTAB, no crackles, no wheezes  Abd: s/NT/ND +BS  Extremities: no c/c/e    Labs:     Lab Results   Component Value Date    WBC 5.97 09/17/2019    HGB 11.2 (L) 09/17/2019    HCT 33.6 (L) 09/17/2019    MCV 89 09/17/2019     09/17/2019     Sodium   Date Value Ref Range Status   09/17/2019 137 136 - 145 mmol/L Final     Potassium   Date Value Ref Range Status   09/17/2019 4.1 3.5 - 5.1 mmol/L Final     Chloride   Date Value Ref Range Status   09/17/2019 108 95 - 110 mmol/L Final     CO2   Date Value Ref Range Status   09/17/2019 24 23 - 29 mmol/L Final     Glucose   Date Value Ref Range Status   09/17/2019 77 70 - 110 mg/dL Final     BUN, Bld   Date Value Ref Range Status   09/17/2019 17 6 - 20 mg/dL Final     Creatinine   Date Value Ref Range Status   09/17/2019 0.9 0.5 - 1.4 mg/dL Final     Calcium   Date Value Ref Range Status   09/17/2019 9.1 8.7 - 10.5 mg/dL Final     Total Protein   Date Value Ref Range Status   09/17/2019 9.2 (H) 6.0 - 8.4 g/dL Final     Albumin   Date Value Ref Range Status   09/17/2019 3.1 (L) 3.5 - 5.2 g/dL Final     Total Bilirubin   Date Value Ref Range Status   09/17/2019 0.3 0.1 - 1.0 mg/dL Final     Comment:     For infants and newborns, interpretation of results should be based  on gestational age, weight and in agreement with clinical  observations.  Premature Infant recommended reference ranges:  Up to 24 hours.............<8.0 mg/dL  Up to 48 hours............<12.0 mg/dL  3-5 days..................<15.0 mg/dL  6-29 days.................<15.0 mg/dL       Alkaline Phosphatase   Date Value Ref Range Status   09/17/2019 60 55 - 135 U/L Final     AST   Date Value Ref Range Status   09/17/2019 10 10 - " 40 U/L Final     ALT   Date Value Ref Range Status   09/17/2019 23 10 - 44 U/L Final     Anion Gap   Date Value Ref Range Status   09/17/2019 5 (L) 8 - 16 mmol/L Final     eGFR if    Date Value Ref Range Status   09/17/2019 >60 >60 mL/min/1.73 m^2 Final     eGFR if non    Date Value Ref Range Status   09/17/2019 >60 >60 mL/min/1.73 m^2 Final     Comment:     Calculation used to obtain the estimated glomerular filtration  rate (eGFR) is the CKD-EPI equation.        Lab Results   Component Value Date    HGBA1C 6.4 (H) 09/17/2019     Lab Results   Component Value Date    LDLCALC 65.4 05/17/2019     Lab Results   Component Value Date    TSH 0.553 01/18/2019         Assessment/Plan     Amna was seen today for follow-up.    Diagnoses and all orders for this visit:    Essential hypertension  at goal.  Cont current medications.  -     Comprehensive metabolic panel; Future    Prediabetes  Worse on last check  Lab Results   Component Value Date    HGBA1C 6.4 (H) 09/17/2019     Was since started on metformin which she has been taking s issues.  Again, advised to stop drinking soft drinks.  Pt was counseled on the need to avoid intake of simple sugars and minimize carbohydrates.  Also recommended weight loss and daily exercise.  -     Hemoglobin A1c; Future    Anemia due to vitamin B12 deficiency, unspecified B12 deficiency type  Stable on last check but Vit B12 still low so supplement resumed at last appt and states Neuro also gave her an injection.  Will repeat levels now.  -     Vitamin B12; Future  -     CBC auto differential; Future    Mixed hyperlipidemia  Lab Results   Component Value Date    LDLCALC 65.4 05/17/2019     Well controlled on crestor, cont meds    Vitamin D deficiency  Better but still low on last check so Vit D increased to twice weekly  Repeat labs now.  -     Vitamin D; Future    Thyroid nodule  Hx FNA previously followed by Dr. Quijano (benign) c last u/s in Oct stable c plan  to repeat annually for 5 yrs.    History of CVA (cerebrovascular accident)  Cont bp and lipid control.  On xarelto for stroke prevention.  Unsure if plavix still needed, has been filled by Dr. Ruth in the past c last note from Dr. Martins stating to cont meds in her plan, pt to clarify c Dr. Ruth.    Complete AV block  Paroxysmal atrial fibrillation  Pacemaker  History of sudden cardiac arrest  Biventricular automatic implantable cardioverter defibrillator in situ  Nonischemic cardiomyopathy from RV pacing, resolved with upgrade cardiac resynchronization therapy  Followed by Dr. Mejia, no acute issues.    Obesity, Class III, BMI 40-49.9 (morbid obesity)  Counseled extensively on need for diet changes and daily exercise.  Discussed examples of healthy eating.  Recommend at least 30 minutes of exercise at least 5 days a week.      Supraorbital neuralgia  Bilateral occipital neuralgia  Chronic migraine without aura, with intractable migraine, so stated, with status migrainosus  Injections as per Dr. 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 Paredes who she has seen in the past.    SHIRA (obstructive sleep apnea)  Needs cpap machine back  -     Ambulatory referral to Sleep Disorders    Depression, major, recurrent, moderate  Followed by psych, meds as per psych    B12 deficiency  As above    Chronic fatigue  W/u as above, resume cpap  -     TSH; Future    HM as above  RTC in 4 mos, sooner if needed.  Labs today.    Tiffany Mina MD  Department of Internal Medicine - [facility]  01/20/2020

## 2020-01-21 ENCOUNTER — TELEPHONE (OUTPATIENT)
Dept: INTERNAL MEDICINE | Facility: CLINIC | Age: 55
End: 2020-01-21

## 2020-01-21 DIAGNOSIS — I10 ESSENTIAL HYPERTENSION: Primary | Chronic | ICD-10-CM

## 2020-01-21 DIAGNOSIS — E04.1 THYROID NODULE: ICD-10-CM

## 2020-01-21 DIAGNOSIS — R73.03 PREDIABETES: ICD-10-CM

## 2020-01-21 DIAGNOSIS — D51.9 ANEMIA DUE TO VITAMIN B12 DEFICIENCY, UNSPECIFIED B12 DEFICIENCY TYPE: ICD-10-CM

## 2020-01-21 NOTE — TELEPHONE ENCOUNTER
----- Message from Julissa Stoddard sent at 1/20/2020  7:05 PM CST -----  Hello All    Your patient wanted to come later in the day to do her blood work after she checked in but never showed up.  Labs were cancelled and will have to be re-ordered in Epic.    Thanks have a great night

## 2020-01-23 RX ORDER — RIVAROXABAN 20 MG/1
TABLET, FILM COATED ORAL
Qty: 30 TABLET | Refills: 11 | Status: SHIPPED | OUTPATIENT
Start: 2020-01-23 | End: 2021-02-11 | Stop reason: SDUPTHER

## 2020-01-24 ENCOUNTER — OFFICE VISIT (OUTPATIENT)
Dept: ORTHOPEDICS | Facility: CLINIC | Age: 55
End: 2020-01-24
Payer: MEDICARE

## 2020-01-24 VITALS
BODY MASS INDEX: 50.02 KG/M2 | DIASTOLIC BLOOD PRESSURE: 83 MMHG | HEART RATE: 80 BPM | HEIGHT: 64 IN | SYSTOLIC BLOOD PRESSURE: 137 MMHG | WEIGHT: 293 LBS

## 2020-01-24 DIAGNOSIS — M65.331 ACQUIRED TRIGGER FINGER OF RIGHT MIDDLE FINGER: Primary | ICD-10-CM

## 2020-01-24 PROCEDURE — 99213 OFFICE O/P EST LOW 20 MIN: CPT | Mod: S$PBB,,, | Performed by: PLASTIC SURGERY

## 2020-01-24 PROCEDURE — 99999 PR PBB SHADOW E&M-EST. PATIENT-LVL V: CPT | Mod: PBBFAC,,, | Performed by: PLASTIC SURGERY

## 2020-01-24 PROCEDURE — 99999 PR PBB SHADOW E&M-EST. PATIENT-LVL V: ICD-10-PCS | Mod: PBBFAC,,, | Performed by: PLASTIC SURGERY

## 2020-01-24 PROCEDURE — 99215 OFFICE O/P EST HI 40 MIN: CPT | Mod: PBBFAC | Performed by: PLASTIC SURGERY

## 2020-01-24 PROCEDURE — 99213 PR OFFICE/OUTPT VISIT, EST, LEVL III, 20-29 MIN: ICD-10-PCS | Mod: S$PBB,,, | Performed by: PLASTIC SURGERY

## 2020-01-24 RX ORDER — SODIUM CHLORIDE 9 MG/ML
INJECTION, SOLUTION INTRAVENOUS CONTINUOUS
Status: CANCELLED | OUTPATIENT
Start: 2020-01-24

## 2020-01-24 NOTE — H&P (VIEW-ONLY)
Chief Complaint: Pain and Numbness of the Right Hand      HPI:    Amna Chawla is a right hand dominant 54 y.o. female presenting today for a right recurrent middle finger trigger digit. She has failed 2 previous injections into the tendon sheath the right middle finger.  She states that her symptoms have slowly returned since her injection in September of 2019.  She complains of swelling and pain with motion of the finger.  She also has occasional triggering.  She discussed that she would like to move forward with surgical release due to the failing steroid injections. She denies any numbness or tingling no other complaints today    Past Medical History:   Diagnosis Date    Anticoagulant long-term use     Anxiety     Asthma     Atrial fibrillation     Brain anoxic injury     Cervicalgia 8/28/2014    CHI (closed head injury) 2/19/2013    Convulsion 5/30/2015    Decreased ROM of left shoulder 4/12/2017    Defibrillator activation 2013    Depression     Heart block     History of sudden cardiac arrest 2/2013    PEA arrest with subsequent long-QT    Hx of psychiatric care     Hypertension     Left atrial enlargement 4/11/2018    Pacemaker 2013    Paresthesia 11/1/2013    Prolonged Q-T interval on ECG 2/8/2013    Psychiatric problem     Seizures     Sleep difficulties     Stroke     weakness lt side    Therapy     Thyroid disease     Upper airway resistance syndrome 2/21/2017       Past Surgical History:   Procedure Laterality Date    breast reduction      CARDIAC DEFIBRILLATOR PLACEMENT      CARDIAC DEFIBRILLATOR PLACEMENT      CARPAL TUNNEL RELEASE Right     COLONOSCOPY N/A 5/7/2019    Procedure: COLONOSCOPY;  Surgeon: Juan Coffey MD;  Location: [facility] ([address] [place] [address]);  Service: Endoscopy;  Laterality: N/A;  per DR. Bustamante Pt to have balloon with DR. Coffey due to excesive looping, could not reach cecum - see telephone encounter 3/8/19 and last procedure report dated 6/24/14/ Blaise Herbert  schedule as 90 min case- ERW  pacemaker/AICD Boitronik/   per pipo Contreras to hold Xareltox 2 days    HERNIA REPAIR      HIATAL HERNIA REPAIR      INSERT / REPLACE / REMOVE PACEMAKER  10/2017    CRT-D upgrade    TOTAL REDUCTION MAMMOPLASTY      TRIGGER FINGER RELEASE Left 8/2/2019    Procedure: RELEASE, TRIGGER FINGER left middle;  Surgeon: Matt Pugh Jr., MD;  Location: [facility];  Service: Plastics;  Laterality: Left;    TUBAL LIGATION          Family History   Problem Relation Age of Onset    Hypertension Mother     COPD Unknown     Heart failure Unknown     Glaucoma Maternal Grandmother     Glaucoma Paternal Grandmother        Social History     Socioeconomic History    Marital status: Single     Spouse name: Not on file    Number of children: Not on file    Years of education: Not on file    Highest education level: Not on file   Occupational History     Employer: [employer]   Social Needs    Financial resource strain: Somewhat hard    Food insecurity:     Worry: Often true     Inability: Often true    Transportation needs:     Medical: No     Non-medical: No   Tobacco Use    Smoking status: Never Smoker    Smokeless tobacco: Never Used   Substance and Sexual Activity    Alcohol use: No     Frequency: 2-4 times a month     Drinks per session: 1 or 2     Binge frequency: Never    Drug use: No    Sexual activity: Not Currently   Lifestyle    Physical activity:     Days per week: 5 days     Minutes per session: 30 min    Stress: Only a little   Relationships    Social connections:     Talks on phone: More than three times a week     Gets together: More than three times a week     Attends [employer] service: Not on file     Active member of club or organization: No     Attends meetings of clubs or organizations: More than 4 times per year     Relationship status: Living with partner   Other Topics Concern    Patient feels they ought to cut down on drinking/drug use Not  Asked    Patient annoyed by others criticizing their drinking/drug use Not Asked    Patient has felt bad or guilty about drinking/drug use Not Asked    Patient has had a drink/used drugs as an eye opener in the AM Not Asked    Are you pregnant or think you may be? Not Asked    Breast-feeding Not Asked   Social History Narrative    Now on disability       Review of patient's allergies indicates:   Allergen Reactions    Aspirin Hives    Imitrex [sumatriptan] Palpitations    Penicillins Hives and Swelling    Shellfish containing products Anaphylaxis     seafood    Percocet [oxycodone-acetaminophen] Itching     States can take    Reglan [metoclopramide hcl] Other (See Comments)     Parkinsonism          Current Outpatient Medications:     ARIPiprazole (ABILIFY) 15 MG Tab, Take 1 tablet (15 mg total) by mouth once daily., Disp: 90 tablet, Rfl: 1    baclofen (LIORESAL) 20 MG tablet, Take 1 tablet (20 mg total) by mouth 3 (three) times daily. (Patient not taking: Reported on 1/20/2020), Disp: 90 tablet, Rfl: 11    blood sugar diagnostic Strp, 1 strip by Misc.(Non-Drug; Combo Route) route 2 (two) times daily., Disp: 200 strip, Rfl: 3    blood-glucose meter kit, Please provide with insurance covered meter, Disp: 1 each, Rfl: 0    carvedilol (COREG) 25 MG tablet, TAKE 1 TABLET BY MOUTH TWICE DAILY, WITH MEALS, Disp: 180 tablet, Rfl: 3    clonazePAM (KLONOPIN) 1 MG tablet, Take 1 tablet (1 mg total) by mouth daily as needed for Anxiety., Disp: 30 tablet, Rfl: 5    clopidogrel (PLAVIX) 75 mg tablet, TAKE 1 TABLET BY MOUTH ONCE DAILY, Disp: 90 tablet, Rfl: 4    cyanocobalamin, vitamin B-12, 1,000 mcg Subl, Place 1,000 mcg under the tongue once daily., Disp: 90 tablet, Rfl: 3    donepezil (ARICEPT) 10 MG tablet, Take 1 tablet (10 mg total) by mouth 2 (two) times daily., Disp: 180 tablet, Rfl: 3    DULoxetine (CYMBALTA) 60 MG capsule, Take 1 capsule (60 mg total) by mouth 2 (two) times daily., Disp: 60  capsule, Rfl: 11    ergocalciferol (ERGOCALCIFEROL) 50,000 unit Cap, Take 1 capsule (50,000 Units total) by mouth twice a week., Disp: 24 capsule, Rfl: 3    flurbiprofen (ANSAID) 100 MG tablet, Take 1 tablet (100 mg total) by mouth 2 (two) times daily as needed (migraine)., Disp: 12 tablet, Rfl: 12    fremanezumab-vfrm (AJOVY) 225 mg/1.5 mL injection, Inject 225 mg into the skin every 28 days., Disp: 1.5 mL, Rfl: 12    gabapentin (NEURONTIN) 300 MG capsule, Take 3 capsules (900 mg total) by mouth 3 (three) times daily., Disp: 810 capsule, Rfl: 12    lancets Misc, 1 Device by Misc.(Non-Drug; Combo Route) route 2 (two) times daily., Disp: 200 each, Rfl: 3    losartan (COZAAR) 100 MG tablet, Take 1 tablet (100 mg total) by mouth once daily., Disp: 90 tablet, Rfl: 3    magnesium 200 mg Tab, Take 200 mg by mouth once daily. (Patient taking differently: Take 200 mg by mouth every other day. ), Disp: 30 each, Rfl: 12    metFORMIN (GLUCOPHAGE) 500 MG tablet, Take 1 tablet (500 mg total) by mouth every evening for 7 days, THEN 1 tablet (500 mg total) 2 (two) times daily with meals., Disp: 187 tablet, Rfl: 3    mupirocin (BACTROBAN) 2 % ointment, Apply to affected area 3 times daily, Disp: 22 g, Rfl: 1    ondansetron (ZOFRAN-ODT) 4 MG TbDL, Take 1 tablet (4 mg total) by mouth every 12 (twelve) hours as needed (nausea)., Disp: 10 tablet, Rfl: 1    pantoprazole (PROTONIX) 40 MG tablet, Take 1 tablet (40 mg total) by mouth once daily., Disp: 90 tablet, Rfl: 3    rosuvastatin (CRESTOR) 40 MG Tab, TAKE 1 TABLET(40 MG) BY MOUTH EVERY EVENING, Disp: 90 tablet, Rfl: 0    silver sulfADIAZINE 1% (SILVADENE) 1 % cream, Apply topically 2 (two) times daily., Disp: 50 g, Rfl: 0    tiaGABine (GABITRIL) 4 MG tablet, Take 1 tablet (4 mg total) by mouth every evening., Disp: 30 tablet, Rfl: 12    traZODone (DESYREL) 100 MG tablet, Take 1 or 1.5 tablets by mouth before bed as needed for insomnia, Disp: 45 tablet, Rfl: 11    TRUE  "METRIX GLUCOSE METER Misc, TEST BG BID, Disp: , Rfl: 0    XARELTO 20 mg Tab, TAKE 1 TABLET BY MOUTH DAILY WITH DINNER OR EVENING MEAL, Disp: 30 tablet, Rfl: 11    Current Facility-Administered Medications:     cyanocobalamin injection 1,000 mcg, 1,000 mcg, Subcutaneous, Once, David Cortez MD    onabotulinumtoxina injection 200 Units, 200 Units, Intramuscular, Q90 Days, David Cortez MD, 200 Units at 10/19/18 1713    onabotulinumtoxina injection 200 Units, 200 Units, Intramuscular, Q90 Days, David Cortez MD, 200 Units at 12/28/18 1106    onabotulinumtoxina injection 200 Units, 200 Units, Intramuscular, Q90 Days, David Cortez MD, 200 Units at 03/13/19 1504    onabotulinumtoxina injection 200 Units, 200 Units, Intramuscular, Q90 Days, David Cortez MD, 200 Units at 05/23/19 1123    onabotulinumtoxina injection 200 Units, 200 Units, Intramuscular, Q90 Days, David Cortez MD, 200 Units at 10/11/19 1112    onabotulinumtoxina injection 200 Units, 200 Units, Intramuscular, Q90 Days, David Cortez MD, 200 Units at 12/19/19 1036        Review of Systems:  Constitutional: no fever or chills  ENT: no nasal congestion or sore throat  Respiratory: no cough or shortness of breath  Cardiovascular: no chest pain or palpitations  Gastrointestinal: no nausea or vomiting, PUD, GERD, NSAID intolerance  Genitourinary: no hematuria or dysuria  Integument/Breast: no rash or pruritis  Hematologic/Lymphatic: no easy bruising or lymphadenopathy  Musculoskeletal: see HPI  Neurological: no seizures or tremors  Behavioral/Psych: no auditory or visual hallucinations      Objective:      PHYSICAL EXAM:  Vitals:    01/24/20 0900   BP: 137/83   Pulse: 80   Weight: (!) 137.4 kg (303 lb)   Height: 5' 4" (1.626 m)   PainSc:   8     General Appearance: WDWN, NAD  Neuro/Psych: Mood & affect appropriate  Lungs: Respirations equal and unlabored.   CV: No apparent CV dysfunction, distal pulses intact, RRR  Skin: Intact " throughout  Extremities:   Right Hand Exam     Tenderness   Right hand tenderness location: Exquisite tenderness over the A1 pulley at the base of the middle finger.    Range of Motion   Hand   MP Middle: abnormal   PIP Middle: abnormal   DIP Middle: normal     Tests   Tinel's sign (median nerve): negative    Other   Scars: present  Sensation: normal  Pulse: present    Comments:  Active triggering of the middle finger with flexion and extension of the digit                Assessment:     Encounter Diagnosis   Name Primary?    Acquired trigger finger of right middle finger Yes          Plan/Discussion:   Amna was seen today for pain and numbness.    Diagnoses and all orders for this visit:    Acquired trigger finger of right middle finger  -     Vital signs; Standing  -     Cleanse with Chlorhexidine (CHG); Standing  -     Diet NPO; Standing  -     Case Request Operating Room: RELEASE, TRIGGER FINGER middle  -     Full code; Standing  -     Place in Outpatient; Standing  -     Reason for No Pharmacological VTE Prophylaxis; Standing    Other orders  -     0.9%  NaCl infusion  -     IP VTE HIGH RISK PATIENT; Standing         She was found to have a recurrent right middle finger trigger digit.  She has failed conservative treatment with corticosteroid injections. She would like to move forward with a surgical release of the trigger digit to improve her symptoms. We discussed performing an open trigger release in the operating room on February 11, 2020.  We discussed the risks benefits and alternatives in detail and she wished to proceed informed consent was obtained the patient was then scheduled for the procedure

## 2020-01-24 NOTE — PROGRESS NOTES
Chief Complaint: Pain and Numbness of the Right Hand      HPI:    Amna Chawla is a right hand dominant 54 y.o. female presenting today for a right recurrent middle finger trigger digit. She has failed 2 previous injections into the tendon sheath the right middle finger.  She states that her symptoms have slowly returned since her injection in September of 2019.  She complains of swelling and pain with motion of the finger.  She also has occasional triggering.  She discussed that she would like to move forward with surgical release due to the failing steroid injections. She denies any numbness or tingling no other complaints today    Past Medical History:   Diagnosis Date    Anticoagulant long-term use     Anxiety     Asthma     Atrial fibrillation     Brain anoxic injury     Cervicalgia 8/28/2014    CHI (closed head injury) 2/19/2013    Convulsion 5/30/2015    Decreased ROM of left shoulder 4/12/2017    Defibrillator activation 2013    Depression     Heart block     History of sudden cardiac arrest 2/2013    PEA arrest with subsequent long-QT    Hx of psychiatric care     Hypertension     Left atrial enlargement 4/11/2018    Pacemaker 2013    Paresthesia 11/1/2013    Prolonged Q-T interval on ECG 2/8/2013    Psychiatric problem     Seizures     Sleep difficulties     Stroke     weakness lt side    Therapy     Thyroid disease     Upper airway resistance syndrome 2/21/2017       Past Surgical History:   Procedure Laterality Date    breast reduction      CARDIAC DEFIBRILLATOR PLACEMENT      CARDIAC DEFIBRILLATOR PLACEMENT      CARPAL TUNNEL RELEASE Right     COLONOSCOPY N/A 5/7/2019    Procedure: COLONOSCOPY;  Surgeon: Juan Coffey MD;  Location: [facility] ([address] [place] [address]);  Service: Endoscopy;  Laterality: N/A;  per DR. Bustamante Pt to have balloon with DR. Coffey due to excesive looping, could not reach cecum - see telephone encounter 3/8/19 and last procedure report dated 6/24/14/ Blaise Herbert  schedule as 90 min case- ERW  pacemaker/AICD Boitronik/   per pipo Contreras to hold Xareltox 2 days    HERNIA REPAIR      HIATAL HERNIA REPAIR      INSERT / REPLACE / REMOVE PACEMAKER  10/2017    CRT-D upgrade    TOTAL REDUCTION MAMMOPLASTY      TRIGGER FINGER RELEASE Left 8/2/2019    Procedure: RELEASE, TRIGGER FINGER left middle;  Surgeon: Matt Pugh Jr., MD;  Location: [facility];  Service: Plastics;  Laterality: Left;    TUBAL LIGATION          Family History   Problem Relation Age of Onset    Hypertension Mother     COPD Unknown     Heart failure Unknown     Glaucoma Maternal Grandmother     Glaucoma Paternal Grandmother        Social History     Socioeconomic History    Marital status: Single     Spouse name: Not on file    Number of children: Not on file    Years of education: Not on file    Highest education level: Not on file   Occupational History     Employer: [employer]   Social Needs    Financial resource strain: Somewhat hard    Food insecurity:     Worry: Often true     Inability: Often true    Transportation needs:     Medical: No     Non-medical: No   Tobacco Use    Smoking status: Never Smoker    Smokeless tobacco: Never Used   Substance and Sexual Activity    Alcohol use: No     Frequency: 2-4 times a month     Drinks per session: 1 or 2     Binge frequency: Never    Drug use: No    Sexual activity: Not Currently   Lifestyle    Physical activity:     Days per week: 5 days     Minutes per session: 30 min    Stress: Only a little   Relationships    Social connections:     Talks on phone: More than three times a week     Gets together: More than three times a week     Attends Anabaptism service: Not on file     Active member of club or organization: No     Attends meetings of clubs or organizations: More than 4 times per year     Relationship status: Living with partner   Other Topics Concern    Patient feels they ought to cut down on drinking/drug use Not  Asked    Patient annoyed by others criticizing their drinking/drug use Not Asked    Patient has felt bad or guilty about drinking/drug use Not Asked    Patient has had a drink/used drugs as an eye opener in the AM Not Asked    Are you pregnant or think you may be? Not Asked    Breast-feeding Not Asked   Social History Narrative    Now on disability       Review of patient's allergies indicates:   Allergen Reactions    Aspirin Hives    Imitrex [sumatriptan] Palpitations    Penicillins Hives and Swelling    Shellfish containing products Anaphylaxis     seafood    Percocet [oxycodone-acetaminophen] Itching     States can take    Reglan [metoclopramide hcl] Other (See Comments)     Parkinsonism          Current Outpatient Medications:     ARIPiprazole (ABILIFY) 15 MG Tab, Take 1 tablet (15 mg total) by mouth once daily., Disp: 90 tablet, Rfl: 1    baclofen (LIORESAL) 20 MG tablet, Take 1 tablet (20 mg total) by mouth 3 (three) times daily. (Patient not taking: Reported on 1/20/2020), Disp: 90 tablet, Rfl: 11    blood sugar diagnostic Strp, 1 strip by Misc.(Non-Drug; Combo Route) route 2 (two) times daily., Disp: 200 strip, Rfl: 3    blood-glucose meter kit, Please provide with insurance covered meter, Disp: 1 each, Rfl: 0    carvedilol (COREG) 25 MG tablet, TAKE 1 TABLET BY MOUTH TWICE DAILY, WITH MEALS, Disp: 180 tablet, Rfl: 3    clonazePAM (KLONOPIN) 1 MG tablet, Take 1 tablet (1 mg total) by mouth daily as needed for Anxiety., Disp: 30 tablet, Rfl: 5    clopidogrel (PLAVIX) 75 mg tablet, TAKE 1 TABLET BY MOUTH ONCE DAILY, Disp: 90 tablet, Rfl: 4    cyanocobalamin, vitamin B-12, 1,000 mcg Subl, Place 1,000 mcg under the tongue once daily., Disp: 90 tablet, Rfl: 3    donepezil (ARICEPT) 10 MG tablet, Take 1 tablet (10 mg total) by mouth 2 (two) times daily., Disp: 180 tablet, Rfl: 3    DULoxetine (CYMBALTA) 60 MG capsule, Take 1 capsule (60 mg total) by mouth 2 (two) times daily., Disp: 60  capsule, Rfl: 11    ergocalciferol (ERGOCALCIFEROL) 50,000 unit Cap, Take 1 capsule (50,000 Units total) by mouth twice a week., Disp: 24 capsule, Rfl: 3    flurbiprofen (ANSAID) 100 MG tablet, Take 1 tablet (100 mg total) by mouth 2 (two) times daily as needed (migraine)., Disp: 12 tablet, Rfl: 12    fremanezumab-vfrm (AJOVY) 225 mg/1.5 mL injection, Inject 225 mg into the skin every 28 days., Disp: 1.5 mL, Rfl: 12    gabapentin (NEURONTIN) 300 MG capsule, Take 3 capsules (900 mg total) by mouth 3 (three) times daily., Disp: 810 capsule, Rfl: 12    lancets Misc, 1 Device by Misc.(Non-Drug; Combo Route) route 2 (two) times daily., Disp: 200 each, Rfl: 3    losartan (COZAAR) 100 MG tablet, Take 1 tablet (100 mg total) by mouth once daily., Disp: 90 tablet, Rfl: 3    magnesium 200 mg Tab, Take 200 mg by mouth once daily. (Patient taking differently: Take 200 mg by mouth every other day. ), Disp: 30 each, Rfl: 12    metFORMIN (GLUCOPHAGE) 500 MG tablet, Take 1 tablet (500 mg total) by mouth every evening for 7 days, THEN 1 tablet (500 mg total) 2 (two) times daily with meals., Disp: 187 tablet, Rfl: 3    mupirocin (BACTROBAN) 2 % ointment, Apply to affected area 3 times daily, Disp: 22 g, Rfl: 1    ondansetron (ZOFRAN-ODT) 4 MG TbDL, Take 1 tablet (4 mg total) by mouth every 12 (twelve) hours as needed (nausea)., Disp: 10 tablet, Rfl: 1    pantoprazole (PROTONIX) 40 MG tablet, Take 1 tablet (40 mg total) by mouth once daily., Disp: 90 tablet, Rfl: 3    rosuvastatin (CRESTOR) 40 MG Tab, TAKE 1 TABLET(40 MG) BY MOUTH EVERY EVENING, Disp: 90 tablet, Rfl: 0    silver sulfADIAZINE 1% (SILVADENE) 1 % cream, Apply topically 2 (two) times daily., Disp: 50 g, Rfl: 0    tiaGABine (GABITRIL) 4 MG tablet, Take 1 tablet (4 mg total) by mouth every evening., Disp: 30 tablet, Rfl: 12    traZODone (DESYREL) 100 MG tablet, Take 1 or 1.5 tablets by mouth before bed as needed for insomnia, Disp: 45 tablet, Rfl: 11    TRUE  "METRIX GLUCOSE METER Misc, TEST BG BID, Disp: , Rfl: 0    XARELTO 20 mg Tab, TAKE 1 TABLET BY MOUTH DAILY WITH DINNER OR EVENING MEAL, Disp: 30 tablet, Rfl: 11    Current Facility-Administered Medications:     cyanocobalamin injection 1,000 mcg, 1,000 mcg, Subcutaneous, Once, David Cortez MD    onabotulinumtoxina injection 200 Units, 200 Units, Intramuscular, Q90 Days, David Cortez MD, 200 Units at 10/19/18 1713    onabotulinumtoxina injection 200 Units, 200 Units, Intramuscular, Q90 Days, David Cortez MD, 200 Units at 12/28/18 1106    onabotulinumtoxina injection 200 Units, 200 Units, Intramuscular, Q90 Days, David Cortez MD, 200 Units at 03/13/19 1504    onabotulinumtoxina injection 200 Units, 200 Units, Intramuscular, Q90 Days, David Cortez MD, 200 Units at 05/23/19 1123    onabotulinumtoxina injection 200 Units, 200 Units, Intramuscular, Q90 Days, David Cortez MD, 200 Units at 10/11/19 1112    onabotulinumtoxina injection 200 Units, 200 Units, Intramuscular, Q90 Days, David Cortez MD, 200 Units at 12/19/19 1036        Review of Systems:  Constitutional: no fever or chills  ENT: no nasal congestion or sore throat  Respiratory: no cough or shortness of breath  Cardiovascular: no chest pain or palpitations  Gastrointestinal: no nausea or vomiting, PUD, GERD, NSAID intolerance  Genitourinary: no hematuria or dysuria  Integument/Breast: no rash or pruritis  Hematologic/Lymphatic: no easy bruising or lymphadenopathy  Musculoskeletal: see HPI  Neurological: no seizures or tremors  Behavioral/Psych: no auditory or visual hallucinations      Objective:      PHYSICAL EXAM:  Vitals:    01/24/20 0900   BP: 137/83   Pulse: 80   Weight: (!) 137.4 kg (303 lb)   Height: 5' 4" (1.626 m)   PainSc:   8     General Appearance: WDWN, NAD  Neuro/Psych: Mood & affect appropriate  Lungs: Respirations equal and unlabored.   CV: No apparent CV dysfunction, distal pulses intact, RRR  Skin: Intact " throughout  Extremities:   Right Hand Exam     Tenderness   Right hand tenderness location: Exquisite tenderness over the A1 pulley at the base of the middle finger.    Range of Motion   Hand   MP Middle: abnormal   PIP Middle: abnormal   DIP Middle: normal     Tests   Tinel's sign (median nerve): negative    Other   Scars: present  Sensation: normal  Pulse: present    Comments:  Active triggering of the middle finger with flexion and extension of the digit                Assessment:     Encounter Diagnosis   Name Primary?    Acquired trigger finger of right middle finger Yes          Plan/Discussion:   Amna was seen today for pain and numbness.    Diagnoses and all orders for this visit:    Acquired trigger finger of right middle finger  -     Vital signs; Standing  -     Cleanse with Chlorhexidine (CHG); Standing  -     Diet NPO; Standing  -     Case Request Operating Room: RELEASE, TRIGGER FINGER middle  -     Full code; Standing  -     Place in Outpatient; Standing  -     Reason for No Pharmacological VTE Prophylaxis; Standing    Other orders  -     0.9%  NaCl infusion  -     IP VTE HIGH RISK PATIENT; Standing         She was found to have a recurrent right middle finger trigger digit.  She has failed conservative treatment with corticosteroid injections. She would like to move forward with a surgical release of the trigger digit to improve her symptoms. We discussed performing an open trigger release in the operating room on February 11, 2020.  We discussed the risks benefits and alternatives in detail and she wished to proceed informed consent was obtained the patient was then scheduled for the procedure

## 2020-01-24 NOTE — TELEPHONE ENCOUNTER
----- Message from Kell Woo RN sent at 1/24/2020  3:04 PM CST -----  Contact: Lenny rice/ Lars [phone]  Jesus Rodgers,  Please call and see what is needed.  It looks like a rx was sent in yesterday.  Thanks,  Kell   ----- Message -----  From: Vishal Moy MA  Sent: 1/24/2020  12:15 PM CST  To: Kell Woo RN        ----- Message -----  From: Autumn Rae  Sent: 1/24/2020  12:04 PM CST  To: Roberto ABREU Staff    Lenny is requesting to speak with re: Xarelto 20 mg. Please advise

## 2020-01-27 ENCOUNTER — TELEPHONE (OUTPATIENT)
Dept: ELECTROPHYSIOLOGY | Facility: CLINIC | Age: 55
End: 2020-01-27

## 2020-01-28 NOTE — TELEPHONE ENCOUNTER
----- Message from Avelino Mejia MD sent at 1/27/2020  5:58 PM CST -----  Yes    ----- Message -----  From: Kell Woo, RODRIGO  Sent: 1/27/2020   5:48 PM CST  To: MD Dr. Roberto Cook,  CrCl is 157 mL/min.  Ok per protocol to hold Xarelto 24 hours prior to trigger finger release surgery?  Thanks,  Kell    ----- Message -----  From: Vishal Moy MA  Sent: 1/24/2020   9:52 AM CST  To: Kell Woo RN        ----- Message -----  From: Zara Saxena LPN  Sent: 1/24/2020   9:38 AM CST  To: Roberto ABREU [facility] Dr Mejia,    Ms Chawla is having a Trigger Finger release done on 2/11/2020 by Dr Nicanor Pugh.  We would like to know how many days before surgery that the patient can safely hold her Plavix & Xeralto.  We appreciate your time and input on this patient's care.     Zara Lua LPN

## 2020-01-31 ENCOUNTER — EXTERNAL CHRONIC CARE MANAGEMENT (OUTPATIENT)
Dept: PRIMARY CARE CLINIC | Facility: CLINIC | Age: 55
End: 2020-01-31
Payer: MEDICARE

## 2020-01-31 PROCEDURE — 99490 CHRNC CARE MGMT STAFF 1ST 20: CPT | Mod: PBBFAC | Performed by: INTERNAL MEDICINE

## 2020-01-31 PROCEDURE — 99490 PR CHRONIC CARE MGMT, 1ST 20 MIN: ICD-10-PCS | Mod: S$PBB,,, | Performed by: INTERNAL MEDICINE

## 2020-01-31 PROCEDURE — 99490 CHRNC CARE MGMT STAFF 1ST 20: CPT | Mod: S$PBB,,, | Performed by: INTERNAL MEDICINE

## 2020-02-07 ENCOUNTER — TELEPHONE (OUTPATIENT)
Dept: ORTHOPEDICS | Facility: CLINIC | Age: 55
End: 2020-02-07

## 2020-02-07 ENCOUNTER — ANESTHESIA EVENT (OUTPATIENT)
Dept: SURGERY | Facility: OTHER | Age: 55
End: 2020-02-07
Payer: MEDICARE

## 2020-02-07 NOTE — TELEPHONE ENCOUNTER
Patient was called with the stop time for her Plavix and Xeralto before surgery. Patient verbalized understanding.     Zara Lua LPN       ----- Message from Milagros Aggarwal RN sent at 2/7/2020  1:04 PM CST -----  Regarding: patient needs direction for Plavis and Xarelto  Phone interview done for sx 2/11.  Patient states she has not received direction for stopping Plavix and Xarelto.    Can someone please call patient today to advise?    Thanks,  Milagros Phillips

## 2020-02-07 NOTE — PRE ADMISSION SCREENING
Pre admit phone call completed.    Instructions given to patient about NPO status as follows:     The evening before surgery do not eat anything after 9 p.m. ( this includes hard candy, chewing gum and mints).  You may only have GATORADE, POWERADE AND WATER from 9 p.m. until you leave your home. DO NOT  DRINK ANY LIQUIDS ON THE WAY TO THE HOSPITAL.      Patient was also instructed on the below information:    Park in the Parking lot behind the hospital or in the [employer] [facility] [facility] across the street from the parking lot.  Parking is complimentary.  If you will be discharged the same day as your procedure, please arrange for a responsible adult to drive you home or  to accompany you if traveling by taxi.  YOU WILL NOT BE PERMITTED TO DRIVE OR TO LEAVE THE HOSPITAL ALONE AFTER SURGERY.  It is strongly recommended that you arrange for someone to remain with you for the first 24 hrs following your surgery.    Patient verbalized understanding of above instructions.    Patient states she has not received direction for stopping Plavix and Xarelto.  Message sent to Dr. Pugh's office requesting phonecall to patient to advise.

## 2020-02-10 ENCOUNTER — TELEPHONE (OUTPATIENT)
Dept: ORTHOPEDICS | Facility: CLINIC | Age: 55
End: 2020-02-10

## 2020-02-10 NOTE — TELEPHONE ENCOUNTER
VM left for patient with surgery arrival time,  A reminder about her post op appointment and a reminder to remain NPO after midnight.  Will try again later.     Zara Lua LPN

## 2020-02-10 NOTE — TELEPHONE ENCOUNTER
Spoke with the patient on the phone and gave her the arrival time for surgery.  Also confirmed her remaining NPO, taking her am cardiac meds and her post op visit.  Patient verbalized understanding of all of the above.

## 2020-02-11 ENCOUNTER — ANESTHESIA (OUTPATIENT)
Dept: SURGERY | Facility: OTHER | Age: 55
End: 2020-02-11
Payer: MEDICARE

## 2020-02-11 ENCOUNTER — HOSPITAL ENCOUNTER (OUTPATIENT)
Facility: OTHER | Age: 55
Discharge: HOME | End: 2020-02-11
Attending: PLASTIC SURGERY | Admitting: PLASTIC SURGERY
Payer: MEDICARE

## 2020-02-11 VITALS
TEMPERATURE: 98 F | DIASTOLIC BLOOD PRESSURE: 68 MMHG | HEIGHT: 64 IN | BODY MASS INDEX: 50.02 KG/M2 | OXYGEN SATURATION: 97 % | SYSTOLIC BLOOD PRESSURE: 129 MMHG | RESPIRATION RATE: 18 BRPM | WEIGHT: 293 LBS | HEART RATE: 71 BPM

## 2020-02-11 DIAGNOSIS — M65.331 ACQUIRED TRIGGER FINGER OF RIGHT MIDDLE FINGER: Primary | ICD-10-CM

## 2020-02-11 LAB — POCT GLUCOSE: 85 MG/DL (ref 70–110)

## 2020-02-11 PROCEDURE — 82962 GLUCOSE BLOOD TEST: CPT | Performed by: PLASTIC SURGERY

## 2020-02-11 PROCEDURE — 26055 PR INCISE FINGER TENDON SHEATH: ICD-10-PCS | Mod: F7,,, | Performed by: PLASTIC SURGERY

## 2020-02-11 PROCEDURE — 26055 INCISE FINGER TENDON SHEATH: CPT | Mod: F7,,, | Performed by: PLASTIC SURGERY

## 2020-02-11 PROCEDURE — 71000015 HC POSTOP RECOV 1ST HR: Performed by: PLASTIC SURGERY

## 2020-02-11 PROCEDURE — 25000003 PHARM REV CODE 250: Performed by: PLASTIC SURGERY

## 2020-02-11 PROCEDURE — 63600175 PHARM REV CODE 636 W HCPCS: Performed by: NURSE ANESTHETIST, CERTIFIED REGISTERED

## 2020-02-11 PROCEDURE — 36000707: Performed by: PLASTIC SURGERY

## 2020-02-11 PROCEDURE — 37000008 HC ANESTHESIA 1ST 15 MINUTES: Performed by: PLASTIC SURGERY

## 2020-02-11 PROCEDURE — 37000009 HC ANESTHESIA EA ADD 15 MINS: Performed by: PLASTIC SURGERY

## 2020-02-11 PROCEDURE — 63600175 PHARM REV CODE 636 W HCPCS: Performed by: ANESTHESIOLOGY

## 2020-02-11 PROCEDURE — 71000016 HC POSTOP RECOV ADDL HR: Performed by: PLASTIC SURGERY

## 2020-02-11 PROCEDURE — 36000706: Performed by: PLASTIC SURGERY

## 2020-02-11 RX ORDER — LIDOCAINE HYDROCHLORIDE AND EPINEPHRINE 10; 10 MG/ML; UG/ML
INJECTION, SOLUTION INFILTRATION; PERINEURAL
Status: DISCONTINUED | OUTPATIENT
Start: 2020-02-11 | End: 2020-02-11 | Stop reason: HOSPADM

## 2020-02-11 RX ORDER — SODIUM CHLORIDE 0.9 % (FLUSH) 0.9 %
3 SYRINGE (ML) INJECTION
Status: DISCONTINUED | OUTPATIENT
Start: 2020-02-11 | End: 2020-02-11 | Stop reason: HOSPADM

## 2020-02-11 RX ORDER — BUPIVACAINE HYDROCHLORIDE 2.5 MG/ML
INJECTION, SOLUTION EPIDURAL; INFILTRATION; INTRACAUDAL
Status: DISCONTINUED | OUTPATIENT
Start: 2020-02-11 | End: 2020-02-11 | Stop reason: HOSPADM

## 2020-02-11 RX ORDER — LIDOCAINE HYDROCHLORIDE 10 MG/ML
0.5 INJECTION, SOLUTION EPIDURAL; INFILTRATION; INTRACAUDAL; PERINEURAL ONCE
Status: ACTIVE | OUTPATIENT
Start: 2020-02-11

## 2020-02-11 RX ORDER — LIDOCAINE HYDROCHLORIDE 20 MG/ML
INJECTION INTRAVENOUS
Status: DISCONTINUED | OUTPATIENT
Start: 2020-02-11 | End: 2020-02-11

## 2020-02-11 RX ORDER — HYDROCODONE BITARTRATE AND ACETAMINOPHEN 5; 325 MG/1; MG/1
1 TABLET ORAL EVERY 4 HOURS PRN
Status: DISCONTINUED | OUTPATIENT
Start: 2020-02-11 | End: 2020-02-11 | Stop reason: HOSPADM

## 2020-02-11 RX ORDER — HYDROMORPHONE HYDROCHLORIDE 2 MG/ML
0.4 INJECTION, SOLUTION INTRAMUSCULAR; INTRAVENOUS; SUBCUTANEOUS EVERY 5 MIN PRN
Status: DISCONTINUED | OUTPATIENT
Start: 2020-02-11 | End: 2020-02-11 | Stop reason: HOSPADM

## 2020-02-11 RX ORDER — FENTANYL CITRATE 50 UG/ML
INJECTION, SOLUTION INTRAMUSCULAR; INTRAVENOUS
Status: DISCONTINUED | OUTPATIENT
Start: 2020-02-11 | End: 2020-02-11

## 2020-02-11 RX ORDER — HYDROCODONE BITARTRATE AND ACETAMINOPHEN 5; 325 MG/1; MG/1
1 TABLET ORAL EVERY 6 HOURS PRN
Qty: 15 TABLET | Refills: 0 | Status: SHIPPED | OUTPATIENT
Start: 2020-02-11 | End: 2020-08-03

## 2020-02-11 RX ORDER — SODIUM CHLORIDE, SODIUM LACTATE, POTASSIUM CHLORIDE, CALCIUM CHLORIDE 600; 310; 30; 20 MG/100ML; MG/100ML; MG/100ML; MG/100ML
INJECTION, SOLUTION INTRAVENOUS CONTINUOUS
Status: ACTIVE | OUTPATIENT
Start: 2020-02-11

## 2020-02-11 RX ORDER — MUPIROCIN 20 MG/G
OINTMENT TOPICAL 2 TIMES DAILY
Status: DISCONTINUED | OUTPATIENT
Start: 2020-02-11 | End: 2020-02-11 | Stop reason: HOSPADM

## 2020-02-11 RX ORDER — ONDANSETRON 2 MG/ML
4 INJECTION INTRAMUSCULAR; INTRAVENOUS DAILY PRN
Status: DISCONTINUED | OUTPATIENT
Start: 2020-02-11 | End: 2020-02-11 | Stop reason: HOSPADM

## 2020-02-11 RX ORDER — SODIUM CHLORIDE 0.9 % (FLUSH) 0.9 %
10 SYRINGE (ML) INJECTION
Status: DISCONTINUED | OUTPATIENT
Start: 2020-02-11 | End: 2020-02-11 | Stop reason: HOSPADM

## 2020-02-11 RX ORDER — PROPOFOL 10 MG/ML
VIAL (ML) INTRAVENOUS
Status: DISCONTINUED | OUTPATIENT
Start: 2020-02-11 | End: 2020-02-11

## 2020-02-11 RX ORDER — OXYCODONE HYDROCHLORIDE 5 MG/1
5 TABLET ORAL
Status: DISCONTINUED | OUTPATIENT
Start: 2020-02-11 | End: 2020-02-11 | Stop reason: HOSPADM

## 2020-02-11 RX ORDER — MEPERIDINE HYDROCHLORIDE 25 MG/ML
12.5 INJECTION INTRAMUSCULAR; INTRAVENOUS; SUBCUTANEOUS ONCE AS NEEDED
Status: DISCONTINUED | OUTPATIENT
Start: 2020-02-11 | End: 2020-02-11 | Stop reason: HOSPADM

## 2020-02-11 RX ORDER — SODIUM CHLORIDE 9 MG/ML
INJECTION, SOLUTION INTRAVENOUS CONTINUOUS
Status: DISCONTINUED | OUTPATIENT
Start: 2020-02-11 | End: 2020-02-11 | Stop reason: HOSPADM

## 2020-02-11 RX ADMIN — PROPOFOL 20 MG: 10 INJECTION, EMULSION INTRAVENOUS at 07:02

## 2020-02-11 RX ADMIN — LIDOCAINE HYDROCHLORIDE 25 MG: 20 INJECTION, SOLUTION INTRAVENOUS at 07:02

## 2020-02-11 RX ADMIN — SODIUM CHLORIDE, SODIUM LACTATE, POTASSIUM CHLORIDE, AND CALCIUM CHLORIDE: 600; 310; 30; 20 INJECTION, SOLUTION INTRAVENOUS at 06:02

## 2020-02-11 RX ADMIN — FENTANYL CITRATE 50 MCG: 50 INJECTION, SOLUTION INTRAMUSCULAR; INTRAVENOUS at 07:02

## 2020-02-11 RX ADMIN — PROPOFOL 60 MG: 10 INJECTION, EMULSION INTRAVENOUS at 07:02

## 2020-02-11 RX ADMIN — HYDROCODONE BITARTRATE AND ACETAMINOPHEN 1 TABLET: 5; 325 TABLET ORAL at 08:02

## 2020-02-11 NOTE — ANESTHESIA PREPROCEDURE EVALUATION
02/11/2020  Amna Chawla is a 54 y.o., female.    Pre-op Assessment    I have reviewed the Patient Summary Reports.     I have reviewed the Nursing Notes.   I have reviewed the Medications.     Review of Systems  Anesthesia Hx:  No problems with previous Anesthesia  History of prior surgery of interest to airway management or planning: Previous anesthesia: General 12/16 [facility], propofol qqt with general anesthesia.  Denies Family Hx of Anesthesia complications.   Denies Personal Hx of Anesthesia complications.   Social:  Non-Smoker    Hematology/Oncology:  Hematology Normal   Oncology Normal     Cardiovascular:   Pacemaker (Emotify AICD) Hypertension, well controlled Denies CAD.   Dysrhythmias  Says no blockages or valvular heart conditions.  Only rhythmn  Disturbance.  MERCHANT at two blocks   Pulmonary:   Asthma asymptomatic Past hx of asthma but no attacks in years   Renal/:  Renal/ Normal     Hepatic/GI:   GERD, well controlled    Musculoskeletal:   botox injections for cervical spasms and occipital headache Spine Disorders: cervical and lumbar    Neurological:   CVA, residual symptoms Seizures Left sided weakness post CVA 2013/anoxic brain injury    Last seizure was in 2014, then only partial   Endocrine:  Endocrine Normal        Physical Exam  General:  Morbid Obesity    Airway/Jaw/Neck:  Airway Findings: Mouth Opening: Normal Tongue: Large  General Airway Assessment: Adult  Mallampati: II  TM Distance: Normal, at least 6 cm         Dental:  Dental Findings: In tact             Anesthesia Plan  Type of Anesthesia, risks & benefits discussed:  Anesthesia Type:  MAC  Patient's Preference:   Intra-op Monitoring Plan: standard ASA monitors  Intra-op Monitoring Plan Comments:   Post Op Pain Control Plan:   Post Op Pain Control Plan Comments:   Induction:    Beta Blocker:         Informed Consent:  Patient understands risks and agrees with Anesthesia plan.  Questions answered. Anesthesia consent signed with patient.  ASA Score: 3     Day of Surgery Review of History & Physical:    H&P update referred to the surgeon.     Anesthesia Plan Notes: IV propofol for local injection by surgeon          Ready For Surgery From Anesthesia Perspective.

## 2020-02-11 NOTE — BRIEF OP NOTE
[facility]  Brief Operative Note    Surgery Date: 2/11/2020     Surgeon(s) and Role:     * Matt Pugh Jr., MD - Primary    Assisting Surgeon: None    Pre-op Diagnosis:  Acquired trigger finger of right middle finger [M65.331]    Post-op Diagnosis:  Post-Op Diagnosis Codes:     * Acquired trigger finger of right middle finger [M65.331]    Procedure(s) (LRB):  RELEASE, TRIGGER FINGER middle (Right)    Anesthesia: Local MAC    Description of the findings of the procedure(s): right middle finger trigger digit    Estimated Blood Loss: * No values recorded between 2/11/2020  7:54 AM and 2/11/2020  8:16 AM *         Specimens:   Specimen (12h ago, onward)    None            Discharge Note    OUTCOME: Patient tolerated treatment/procedure well without complication and is now ready for discharge.    DISPOSITION: Home or Self Care    FINAL DIAGNOSIS:  Acquired trigger finger of right middle finger    FOLLOWUP: In clinic    DISCHARGE INSTRUCTIONS:    Discharge Procedure Orders   Diet general     Call MD for:  temperature >100.4     Call MD for:  persistent nausea and vomiting     Call MD for:  severe uncontrolled pain     Call MD for:  difficulty breathing, headache or visual disturbances     Call MD for:  redness, tenderness, or signs of infection (pain, swelling, redness, odor or green/yellow discharge around incision site)     Call MD for:  hives     Call MD for:  persistent dizziness or light-headedness     Call MD for:  extreme fatigue     No driving, operating heavy equipment or signing legal documents while taking pain medication.     Lifting restrictions     Keep surgical extremity elevated     Other restrictions (specify):     Remove dressing in 72 hours

## 2020-02-11 NOTE — OP NOTE
[facility]  Plastic Surgery  Operative Note    SUMMARY     Date of Procedure: 2/11/2020     Procedure: Procedure(s) (LRB):  RELEASE, TRIGGER FINGER middle (Right)     Surgeon(s) and Role:     * Matt Pugh Jr., MD - Primary    Assisting Surgeon: None    Pre-Operative Diagnosis: Acquired trigger finger of right middle finger [M65.331]    Post-Operative Diagnosis:  Same  Anesthesia: Local MAC    Technical Procedures Used:  Open release of the A1 pulley of the right middle finger    Description of the Findings of the Procedure:  Right middle finger trigger digit    Significant Surgical Tasks Conducted by the Assistant(s), if Applicable:  None    Complications: No    Estimated Blood Loss (EBL): * No values recorded between 2/11/2020  7:54 AM and 2/11/2020  8:10 AM *           Implants: * No implants in log *    Specimens:   Specimen (12h ago, onward)    None                  Condition: Good    Disposition: PACU - hemodynamically stable.    Attestation: I was present and scrubbed for the entire procedure.     Indications:  Amna Chawla is a 54 y.o. female who presented with a recurrent right middle finger trigger digit and failed conservative treatment options using corticosteroid injections. The patient a history of a left hand trigger digit that responded well to surgical release.  Discussed performing an open release of the A1 pulley of the right middle finger.  We discussed risks benefits and alternatives in detail and she wished proceed informed consent was obtained patient was then scheduled for the procedure.    Procedure in detail  After proper identification of Amna Chawla in the preoperative area the patient was wheeled to operative room on a hospital stretcher pressure points padded and checked this time. A time-out was called with surgeons nurses and anesthesia agreed upon the patient and procedure. A padded 18 in tourniquet was then placed to her right upper extremity.  She  was then induced under MAC sedation.  Once the patient was well sedated 5 cc of 1% lidocaine with epinephrine was injected over the right middle finger A1 pulley.  The right upper extremity was then prepped and draped in usual sterile fashion. Next an Esmarch tourniquet was used exsanguinate the hand and the tourniquet was inflated to 250 mL mercury.  A 1 cm incision was made over the A1 pulley in line with the metacarpal.  This was carried down through skin and dermis.  The subcutaneous tissue was then carefully dissected down to the tendon sheath.  The neurovascular bundles were retracted in the radial and ulnar directions and protected.  Then identified the A1 pulley was then incised longitudinally under direct visualization using a 15 blade scalpel.  After release the A1 pulley the FDS and FDP tendons were then retracted out of the tendon sheath and any adhesions between the 2 were then broken down.  The tendons were then allowed to retract back into the tendon sheath.  The patient was then awoke from MAC anesthesia in asked to fully flex and extend the digit. She was able to make a composite fist without any active triggering noted.  Was then satisfied with release of the a 1 pulley.  Next the wound was irrigated copious amounts normal saline and the tourniquet was then released. Hemostasis was achieved using bipolar cautery.  The wound was then closed using a 4 O nylon suture in horizontal interrupted fashion. The wound was then cleaned and dried 0.25% Marcaine was injected around the incision postoperative anesthesia.  Xeroform was then applied followed by dry sterile dressing. This concluded Procedure the patient tolerated procedure well without any complications at the end the case needle and sponge counts were correct x2 and I was present scrubbed during all aspects of procedure. She was then taken to PACU for the recovery

## 2020-02-11 NOTE — ANESTHESIA POSTPROCEDURE EVALUATION
Anesthesia Post Evaluation    Patient: Amna Chawla    Procedure(s) Performed: Procedure(s) (LRB):  RELEASE, TRIGGER FINGER middle (Right)    Final Anesthesia Type: general    Patient location during evaluation: OPS  Patient participation: Yes- Able to Participate  Level of consciousness: awake and alert  Post-procedure vital signs: reviewed and stable  Pain management: adequate  Airway patency: patent    PONV status at discharge: No PONV  Anesthetic complications: no      Cardiovascular status: blood pressure returned to baseline and hemodynamically stable  Respiratory status: unassisted, spontaneous ventilation and room air  Hydration status: euvolemic  Follow-up not needed.          Vitals Value Taken Time   /77 2/11/2020  6:06 AM   Temp 36.7 °C (98 °F) 2/11/2020  6:06 AM   Pulse 84 2/11/2020  6:06 AM   Resp 18 2/11/2020  6:06 AM   SpO2 97 % 2/11/2020  6:06 AM         No case tracking events are documented in the log.      Pain/Kofi Score: No data recorded

## 2020-02-13 ENCOUNTER — TELEPHONE (OUTPATIENT)
Dept: ORTHOPEDICS | Facility: CLINIC | Age: 55
End: 2020-02-13

## 2020-02-13 DIAGNOSIS — I10 ESSENTIAL HYPERTENSION: Primary | Chronic | ICD-10-CM

## 2020-02-17 ENCOUNTER — TELEPHONE (OUTPATIENT)
Dept: PHARMACY | Facility: CLINIC | Age: 55
End: 2020-02-17

## 2020-02-21 ENCOUNTER — PATIENT MESSAGE (OUTPATIENT)
Dept: ADMINISTRATIVE | Facility: OTHER | Age: 55
End: 2020-02-21

## 2020-02-26 ENCOUNTER — OFFICE VISIT (OUTPATIENT)
Dept: ORTHOPEDICS | Facility: CLINIC | Age: 55
End: 2020-02-26
Payer: MEDICARE

## 2020-02-26 VITALS
HEIGHT: 64 IN | DIASTOLIC BLOOD PRESSURE: 81 MMHG | HEART RATE: 76 BPM | BODY MASS INDEX: 50.02 KG/M2 | WEIGHT: 293 LBS | SYSTOLIC BLOOD PRESSURE: 142 MMHG

## 2020-02-26 DIAGNOSIS — Z98.890 STATUS POST SURGERY: Primary | ICD-10-CM

## 2020-02-26 PROCEDURE — 99214 OFFICE O/P EST MOD 30 MIN: CPT | Mod: PBBFAC | Performed by: PLASTIC SURGERY

## 2020-02-26 PROCEDURE — 99024 POSTOP FOLLOW-UP VISIT: CPT | Mod: POP,,, | Performed by: PLASTIC SURGERY

## 2020-02-26 PROCEDURE — 99999 PR PBB SHADOW E&M-EST. PATIENT-LVL IV: ICD-10-PCS | Mod: PBBFAC,,, | Performed by: PLASTIC SURGERY

## 2020-02-26 PROCEDURE — 99024 PR POST-OP FOLLOW-UP VISIT: ICD-10-PCS | Mod: POP,,, | Performed by: PLASTIC SURGERY

## 2020-02-26 PROCEDURE — 99999 PR PBB SHADOW E&M-EST. PATIENT-LVL IV: CPT | Mod: PBBFAC,,, | Performed by: PLASTIC SURGERY

## 2020-02-26 NOTE — PROGRESS NOTES
"Ms. Chawla is here today for a post-operative visit.she is 15 days status post right MF trigger release. she reports that she is doing well.  Pain is  minimal.  she is not taking pain medication . she denies fever, chills, and sweats since the time of the surgery. She has noticed some drainage around the suture line over the last 2 days.  She denies any severe pain redness or swelling.    Physical exam:    Vitals:    02/26/20 0843   BP: (!) 142/81   Pulse: 76   Weight: (!) 137.4 kg (303 lb)   Height: 5' 4" (1.626 m)   PainSc: 0-No pain     Sutures in place  Incision is clean, dry and intact.  There is no erythema or exudate.  There is no sign of any infection. she is NVI.  She is able to make composite fist with no active triggering there is limited range of motion secondary to stiffness and pain.  There is no tenderness over the flexor tendon sheath.  There was some mild purulent drainage from the suture tracts consistent with a suture abscess.    Assessment:  Status post surgery    Plan:  Amna was seen today for post-op evaluation.    Diagnoses and all orders for this visit:    Status post surgery        - sutures removed today in clinic and the wound was cleaned with peroxide.  A small amount of purulent material was relieved from the suture tract and the patient experienced improvement her symptoms. The area was dressed with bacitracin ointment and a Band-Aid.  She was advised to continue with bacitracin ointment twice a day and cleaned the area thoroughly with soap and water.  - Continue Range of motion exercises   - Tylenol 500 mg and/or Ibuprofen 400mg every 4-6 hours with food for pain as tolerated  - - Follow up:  As needed    "

## 2020-02-28 ENCOUNTER — PATIENT MESSAGE (OUTPATIENT)
Dept: INTERNAL MEDICINE | Facility: CLINIC | Age: 55
End: 2020-02-28

## 2020-02-28 DIAGNOSIS — E78.2 MIXED HYPERLIPIDEMIA: ICD-10-CM

## 2020-02-28 DIAGNOSIS — I48.0 PAROXYSMAL ATRIAL FIBRILLATION: ICD-10-CM

## 2020-02-28 DIAGNOSIS — E66.01 OBESITY, CLASS III, BMI 40-49.9 (MORBID OBESITY): Primary | ICD-10-CM

## 2020-02-28 DIAGNOSIS — I10 ESSENTIAL HYPERTENSION: ICD-10-CM

## 2020-02-28 DIAGNOSIS — I42.8 NONISCHEMIC CARDIOMYOPATHY: ICD-10-CM

## 2020-02-28 RX ORDER — ROSUVASTATIN CALCIUM 40 MG/1
40 TABLET, COATED ORAL NIGHTLY
Qty: 90 TABLET | Refills: 3 | Status: SHIPPED | OUTPATIENT
Start: 2020-02-28 | End: 2021-06-09

## 2020-02-29 ENCOUNTER — EXTERNAL CHRONIC CARE MANAGEMENT (OUTPATIENT)
Dept: PRIMARY CARE CLINIC | Facility: CLINIC | Age: 55
End: 2020-02-29
Payer: MEDICARE

## 2020-02-29 PROCEDURE — 99490 CHRNC CARE MGMT STAFF 1ST 20: CPT | Mod: S$PBB,,, | Performed by: INTERNAL MEDICINE

## 2020-02-29 PROCEDURE — G2058 CCM ADD 20MIN: HCPCS | Mod: PBBFAC,25,27 | Performed by: INTERNAL MEDICINE

## 2020-02-29 PROCEDURE — 99490 CHRNC CARE MGMT STAFF 1ST 20: CPT | Mod: PBBFAC,25,27 | Performed by: INTERNAL MEDICINE

## 2020-02-29 PROCEDURE — G2058 PR CHRON CARE MGMT, EA ADDTL 20 MINS: ICD-10-PCS | Mod: S$PBB,,, | Performed by: INTERNAL MEDICINE

## 2020-02-29 PROCEDURE — 99490 PR CHRONIC CARE MGMT, 1ST 20 MIN: ICD-10-PCS | Mod: S$PBB,,, | Performed by: INTERNAL MEDICINE

## 2020-02-29 PROCEDURE — G2058 CCM ADD 20MIN: HCPCS | Mod: S$PBB,,, | Performed by: INTERNAL MEDICINE

## 2020-03-02 ENCOUNTER — CLINICAL SUPPORT (OUTPATIENT)
Dept: CARDIOLOGY | Facility: HOSPITAL | Age: 55
End: 2020-03-02
Attending: INTERNAL MEDICINE
Payer: MEDICARE

## 2020-03-02 DIAGNOSIS — Z95.810 AICD (AUTOMATIC CARDIOVERTER/DEFIBRILLATOR) PRESENT: ICD-10-CM

## 2020-03-02 PROCEDURE — 93296 REM INTERROG EVL PM/IDS: CPT | Performed by: INTERNAL MEDICINE

## 2020-03-02 PROCEDURE — 93295 DEV INTERROG REMOTE 1/2/MLT: CPT | Mod: ,,, | Performed by: INTERNAL MEDICINE

## 2020-03-02 PROCEDURE — 93295 CARDIAC DEVICE CHECK - REMOTE: ICD-10-PCS | Mod: ,,, | Performed by: INTERNAL MEDICINE

## 2020-03-03 ENCOUNTER — TELEPHONE (OUTPATIENT)
Dept: INTERNAL MEDICINE | Facility: CLINIC | Age: 55
End: 2020-03-03

## 2020-03-03 ENCOUNTER — LAB VISIT (OUTPATIENT)
Dept: LAB | Facility: OTHER | Age: 55
End: 2020-03-03
Attending: INTERNAL MEDICINE
Payer: MEDICARE

## 2020-03-03 DIAGNOSIS — I10 ESSENTIAL HYPERTENSION: Chronic | ICD-10-CM

## 2020-03-03 DIAGNOSIS — R73.03 PREDIABETES: ICD-10-CM

## 2020-03-03 DIAGNOSIS — E04.1 THYROID NODULE: ICD-10-CM

## 2020-03-03 DIAGNOSIS — D51.9 ANEMIA DUE TO VITAMIN B12 DEFICIENCY, UNSPECIFIED B12 DEFICIENCY TYPE: ICD-10-CM

## 2020-03-03 DIAGNOSIS — E55.9 VITAMIN D DEFICIENCY: ICD-10-CM

## 2020-03-03 LAB
ALBUMIN SERPL BCP-MCNC: 3.3 G/DL (ref 3.5–5.2)
ALP SERPL-CCNC: 62 U/L (ref 55–135)
ALT SERPL W/O P-5'-P-CCNC: 24 U/L (ref 10–44)
ANION GAP SERPL CALC-SCNC: 6 MMOL/L (ref 8–16)
ANISOCYTOSIS BLD QL SMEAR: SLIGHT
AST SERPL-CCNC: 16 U/L (ref 10–40)
BASOPHILS # BLD AUTO: ABNORMAL K/UL (ref 0–0.2)
BASOPHILS NFR BLD: 1 % (ref 0–1.9)
BILIRUB SERPL-MCNC: 0.2 MG/DL (ref 0.1–1)
BUN SERPL-MCNC: 16 MG/DL (ref 6–20)
CALCIUM SERPL-MCNC: 9.1 MG/DL (ref 8.7–10.5)
CHLORIDE SERPL-SCNC: 106 MMOL/L (ref 95–110)
CO2 SERPL-SCNC: 25 MMOL/L (ref 23–29)
CREAT SERPL-MCNC: 0.9 MG/DL (ref 0.5–1.4)
DIFFERENTIAL METHOD: ABNORMAL
EOSINOPHIL # BLD AUTO: ABNORMAL K/UL (ref 0–0.5)
EOSINOPHIL NFR BLD: 0 % (ref 0–8)
ERYTHROCYTE [DISTWIDTH] IN BLOOD BY AUTOMATED COUNT: 15.9 % (ref 11.5–14.5)
EST. GFR  (AFRICAN AMERICAN): >60 ML/MIN/1.73 M^2
EST. GFR  (NON AFRICAN AMERICAN): >60 ML/MIN/1.73 M^2
ESTIMATED AVG GLUCOSE: 137 MG/DL (ref 68–131)
GLUCOSE SERPL-MCNC: 98 MG/DL (ref 70–110)
HBA1C MFR BLD HPLC: 6.4 % (ref 4–5.6)
HCT VFR BLD AUTO: 35.9 % (ref 37–48.5)
HGB BLD-MCNC: 11.5 G/DL (ref 12–16)
IMM GRANULOCYTES # BLD AUTO: ABNORMAL K/UL (ref 0–0.04)
IMM GRANULOCYTES NFR BLD AUTO: ABNORMAL % (ref 0–0.5)
LYMPHOCYTES # BLD AUTO: ABNORMAL K/UL (ref 1–4.8)
LYMPHOCYTES NFR BLD: 41 % (ref 18–48)
MCH RBC QN AUTO: 29.6 PG (ref 27–31)
MCHC RBC AUTO-ENTMCNC: 32 G/DL (ref 32–36)
MCV RBC AUTO: 93 FL (ref 82–98)
MONOCYTES # BLD AUTO: ABNORMAL K/UL (ref 0.3–1)
MONOCYTES NFR BLD: 8 % (ref 4–15)
NEUTROPHILS NFR BLD: 48 % (ref 38–73)
NEUTS BAND NFR BLD MANUAL: 2 %
NRBC BLD-RTO: 0 /100 WBC
PLATELET # BLD AUTO: 227 K/UL (ref 150–350)
PLATELET BLD QL SMEAR: ABNORMAL
PMV BLD AUTO: 11.8 FL (ref 9.2–12.9)
POTASSIUM SERPL-SCNC: 4.4 MMOL/L (ref 3.5–5.1)
PROT SERPL-MCNC: 9.9 G/DL (ref 6–8.4)
RBC # BLD AUTO: 3.88 M/UL (ref 4–5.4)
SODIUM SERPL-SCNC: 137 MMOL/L (ref 136–145)
TSH SERPL DL<=0.005 MIU/L-ACNC: 3.2 UIU/ML (ref 0.4–4)
VIT B12 SERPL-MCNC: 292 PG/ML (ref 210–950)
WBC # BLD AUTO: 3.28 K/UL (ref 3.9–12.7)

## 2020-03-03 PROCEDURE — 85027 COMPLETE CBC AUTOMATED: CPT

## 2020-03-03 PROCEDURE — 82607 VITAMIN B-12: CPT

## 2020-03-03 PROCEDURE — 85007 BL SMEAR W/DIFF WBC COUNT: CPT

## 2020-03-03 PROCEDURE — 80053 COMPREHEN METABOLIC PANEL: CPT

## 2020-03-03 PROCEDURE — 83036 HEMOGLOBIN GLYCOSYLATED A1C: CPT

## 2020-03-03 PROCEDURE — 36415 COLL VENOUS BLD VENIPUNCTURE: CPT

## 2020-03-03 PROCEDURE — 84443 ASSAY THYROID STIM HORMONE: CPT

## 2020-03-03 RX ORDER — ERGOCALCIFEROL 1.25 MG/1
50000 CAPSULE ORAL
Qty: 24 CAPSULE | Refills: 3 | Status: SHIPPED | OUTPATIENT
Start: 2020-03-05 | End: 2021-04-12 | Stop reason: SDUPTHER

## 2020-03-03 RX ORDER — MAGNESIUM 200 MG
2000 TABLET ORAL DAILY
Qty: 180 TABLET | Refills: 3 | Status: SHIPPED | OUTPATIENT
Start: 2020-03-03 | End: 2021-04-27 | Stop reason: SDUPTHER

## 2020-03-03 RX ORDER — METFORMIN HYDROCHLORIDE 1000 MG/1
1000 TABLET ORAL 2 TIMES DAILY WITH MEALS
Qty: 180 TABLET | Refills: 3 | Status: SHIPPED | OUTPATIENT
Start: 2020-03-03 | End: 2021-01-21

## 2020-03-03 NOTE — TELEPHONE ENCOUNTER
Pt notified of lab results.  b12 still suboptimal on 1000 SL daily, advised to double dose and she will see if she can get another injection from Dr. Cortez next week.  Cont ergocalciferol twice weekly since suboptimal on weekly dose.  A1c unchanged despite Metformin.  Reports she is compliant c all meds.  Ok to increase to 1000 bid.

## 2020-03-09 ENCOUNTER — PATIENT MESSAGE (OUTPATIENT)
Dept: NEUROLOGY | Facility: CLINIC | Age: 55
End: 2020-03-09

## 2020-03-09 DIAGNOSIS — G43.711 CHRONIC MIGRAINE WITHOUT AURA, WITH INTRACTABLE MIGRAINE, SO STATED, WITH STATUS MIGRAINOSUS: Primary | ICD-10-CM

## 2020-03-10 ENCOUNTER — PATIENT MESSAGE (OUTPATIENT)
Dept: INTERNAL MEDICINE | Facility: CLINIC | Age: 55
End: 2020-03-10

## 2020-03-10 ENCOUNTER — PROCEDURE VISIT (OUTPATIENT)
Dept: NEUROLOGY | Facility: CLINIC | Age: 55
End: 2020-03-10
Payer: MEDICARE

## 2020-03-10 ENCOUNTER — PATIENT MESSAGE (OUTPATIENT)
Dept: NEUROLOGY | Facility: CLINIC | Age: 55
End: 2020-03-10

## 2020-03-10 DIAGNOSIS — E66.01 OBESITY, CLASS III, BMI 40-49.9 (MORBID OBESITY): ICD-10-CM

## 2020-03-10 DIAGNOSIS — G43.711 CHRONIC MIGRAINE WITHOUT AURA, WITH INTRACTABLE MIGRAINE, SO STATED, WITH STATUS MIGRAINOSUS: Primary | ICD-10-CM

## 2020-03-10 DIAGNOSIS — D50.8 OTHER IRON DEFICIENCY ANEMIA: Primary | ICD-10-CM

## 2020-03-10 PROCEDURE — 64615 CHEMODENERV MUSC MIGRAINE: CPT | Mod: PBBFAC | Performed by: PSYCHIATRY & NEUROLOGY

## 2020-03-10 PROCEDURE — 64615 PR CHEMODENERVATION OF MUSCLE FOR CHRONIC MIGRAINE: ICD-10-PCS | Mod: S$PBB,,, | Performed by: PSYCHIATRY & NEUROLOGY

## 2020-03-10 PROCEDURE — 99499 UNLISTED E&M SERVICE: CPT | Mod: S$PBB,,, | Performed by: PSYCHIATRY & NEUROLOGY

## 2020-03-10 PROCEDURE — 96372 THER/PROPH/DIAG INJ SC/IM: CPT | Mod: PBBFAC

## 2020-03-10 PROCEDURE — 99499 NO LOS: ICD-10-PCS | Mod: S$PBB,,, | Performed by: PSYCHIATRY & NEUROLOGY

## 2020-03-10 PROCEDURE — 64615 CHEMODENERV MUSC MIGRAINE: CPT | Mod: S$PBB,,, | Performed by: PSYCHIATRY & NEUROLOGY

## 2020-03-10 RX ORDER — CYANOCOBALAMIN 1000 UG/ML
1000 INJECTION, SOLUTION INTRAMUSCULAR; SUBCUTANEOUS
Status: COMPLETED | OUTPATIENT
Start: 2020-03-10 | End: 2020-03-10

## 2020-03-10 RX ADMIN — CYANOCOBALAMIN 1000 MCG: 1000 INJECTION, SOLUTION INTRAMUSCULAR at 10:03

## 2020-03-10 RX ADMIN — ONABOTULINUMTOXINA 200 UNITS: 100 INJECTION, POWDER, LYOPHILIZED, FOR SOLUTION INTRADERMAL; INTRAMUSCULAR at 10:03

## 2020-03-10 NOTE — PROCEDURES
Follow up:   independent left and right parietal ice prick HA     Prior note:   HA much improved   Only 2 since last visit      Prior note:   HA are more frequent   Taking 1600 mg motrin + zanaflex   Went to ER recently      Prior note:   HA overall stable in Hz and intensity   Reports a wearing off effect of BOTOX since last week   Taking zanaflex 8 mg TID        Prior note:   HA started 12/24   She feels BOTOX wore off last week   Reports GI distress from taking to many motrin      Prior note:   4/week HA - L vertex   Jabs - vertex either sides      She reports migraine that woke her up in the morning - associated with L side facial droop      Prior note:   She gets acceptable relief for 2.5 months after BOTOX      Prior note:   No recurrent stroke symptoms   starting having whole body tremors since this AM. Took 1 tablet of lorazepam   Last night had a HA, took trazodone       Admit Date: 4/11/2018  2:03 PM   Discharge Date and Time: 4/12/2018  8:30 PM   History of Present Illness: Ms. Chawla is a 51 yo F with afib on Eliquis (last dose this am), prior cardiac arrest with associated acute ischemic stroke with residual mild L sided weakness, CAD with ICD in situ, HTN, and HLD who presented with acute R sided weakness and sensation changes.  She originally called EMS for palpitations, but developed acute right sided facial droop and RUE weakness at 1:40pm today, after EMS had arrived.  She denies changes to speech or vision.  SBP en route 200/100.  She is ineligible for tPA 2/2 Eliquis use.  She is not suspected to have an LVO.  She will be admitted to [facility] for observation overnight.     Hospital Course (synopsis of major diagnoses, care, treatment, and services provided during the course of the hospital stay): Ms. Chawla was admitted for acute stroke workup. Pt with pacemaker so unable to obtain MRI Brain; CTA H/N demonstrated no evidence acute infarction, though did exhibit noncalcified plaquing of carotid  bifurcations and proximal ICAs with < 50% stenosis, so Plavix was added to pt's daily home regimen. Concerned for TIA at this time though Migraine very high on differential due to pt's hx headaches, including presently this admission. Hypertensive urgency/emergency also considered due to pt's very elevated levels with EMS. Per chart review, Eliquis was a new medication since 4/3/18 (had switched from Coumadin), however staff Faraz concerned that pt had a potential event while on Eliquis. She wished to switch to Pradaxa as an alternative DOAC (as it has an option for reversal), however changed to Xarelto per pt's insurance coverage.   While admitted, pt given cocktail for HA which she has received previously at the infusion clinic - IV Magnesium, IM Toradol, 2mg IV Morphine, 500cc NS bolus (IV Benadryl not included this time as pt had received a dose earlier that day prior to contrast imaging.) HA improved somewhat and pt was encouraged to follow up with Dr. Cortez for continued management of botox injections, etc. At that time, pt also found with hyponatremia; d/c'd Clorthalidone and plan was made for pt to follow up in [facility] on Monday 4/16/18 for repeat CMP to evaluate Na level and BP check since discontinuing this medication.  Ms. Chawla was evaluated and treated by PT, OT, and SLP who recommend d/c with outpatient PT and OT. She was discharged home 4/12/18 with [facility] follow-up Monday      Prior note:   2 weeks ago BOTOX effect wore off   Used up all her percocet   3 wks h/o:   Reports frequent falls - falling forward, no LOC, no injuries, able to protect falls by hands   triggers - unknown   Also occ stumbling   Dragging L foot   No Pain in back or legs   No numbness or weakness in legs   No B/B incontinence   No lightheadedness      Prior note:    Flare up of TMJ and HA during daughter's wedding   NSAIDS helped   2 weeks ago BOTOX effect wore off      Prior note:   2 weeks ago BOTOX effect  wore off   Has severe spasm and pain in the traps catalina   Weather change has provoked severe migraine + nausea   She started coumadin       Prior note:   3 weeks ago BOTOX effect wore off   She reports severe daily HA    During BOTOX periods she feels she  her HA. Much less intense      Prior note:   The patient is a 51-year-old female who [facility]. Since her last visit, she reports growing frustration   about the fact that nothing has helped her with regards to her headaches. She   continues to experience daily headaches. She takes mefenamic acid and   tizanidine every night, which only provides her two hours of relief. She is   unable to sleep because of the refractory headaches. She is incapacitated   because of severe headaches. She reports minimal relief with infusions that she  gets on a periodic basis. She does report an improvement overall with the   Botox. However, this effect has worn off in the last two weeks. She also   reports an episode wherein she fell with loss of consciousness, which was not   provoked. As a result, she injured her ankle and is currently wearing a boot.      Prior note:   since last week - Reports vertigo for 6 - 7 minutes upto 3 times daily   Nearly daily severe HA - no response to ponstel , compazine   She is late for her BOTOX - last 2 weeks were painful       Follow up:   R sided Headache is constant max at TMJ on R   Prior note:   She reports new episodes of R face tingling. Sh also reports 2 episodes of blurred vision lasting 15 minutes. These hapended while watching TV. Her pain remains unchanged   Follow up:   2 days of severe HA during Thanksgiving   Pounding bifrontal region   Pain worse over L eye brow   Follow up:   Reports bifrontal severe HA (worse on L) with no nausea with sudden onset . Occurs daily   NO note:  I found no papilledema or other changes to suggest elevated intracranial pressure or other  "alternative etiology for her headaches. Repeat exam in two years.  HA are less frequent and less intense.   (R levator scapula spasm)   symptoms better with lateral flexion to L   Prior note:   She still has daily HA with severe sharp stabbing pain behind L eye lasting for 15 sec   Prior note:   Daily pain. 2/week - nausea.  Shu Lares RN at 9/17/2014 4:53 PM  Pt presented to clinic for medical advice. Pt is seen by Dr. Paredes and Dr. Cortez.  1) She went ER on 9/13/14 for a migraine. She was given Reglan, Benadryl, MSO4 and IVF. A couple days later she developed an all over body "tremor". She states "I think I have Parkinson's". - Per Dr. Paredes, medication related tremor (Reglan & Benadryl). Informed her it should wear off in a few days. If not, she is to call and let us know.  2) Headaches - triggered by insomnia.   Current meds:  Ketoprofen - she took it once and said her "throat closed up" and she's not supposed to have anything with aspirin in it.  Nortriptyline - she was taking it at night, but it didn't help her sleep, so she stopped it.  Per Dr. Cortez, discontinue Ketoprofen and Nortriptyline. Start Effexor XR 37.5mg QD, tizanidine 4mg BID PRN headache and Klonopin 0.25 - 0.5mg QHS PRN. Will schedule pt for sphenocath procedure within the next week.   Prior note:   48 yo woman with history of HTN and asthma, both reportedly controlled, in hospital early 2013 after "passed out" and became unresponsive. CPR in the field for 8 minutes until EMS arrived. Per ED note, on arrival she was found to be in PEA, given epinephrine. Vfib/Vtach was achieved which was shocked x1. Patient converted to sinus tachycardia after shock. I do not know the time course for the above treatment. On arrival to facility patient was in sinus tachycardia with strong pulses, intubated and unresponsive. ET tube placement was confirmed with direct laryngoscopy and good bilateral breath sounds were heard after the tube was pulled " "back 2 cm. Reported to have "withdrawal" movements in ER during stabilization, then sedated and cooling protocol initiated. Had remarkable   recovery and first seen in clinic in 2013.   Headache history: cluster   Age of onset -    Location - behind L eye   Nature of pain - sharp   Prodrome - no   Aura - No   Duration of headache - 6-7 min   Time to peak intensity -   Pain scale - range of intensity - 9/10   Frequency - daily   Status Migrainosus history - 1-3 days   Time of day of most headaches- anytime   Associated symptoms with the headache:   Red eyes   swelling of L face   Headache history: Migraine   Age of onset -    Location - bifrontal   Nature of pain - Pounding   Prodrome - no   Aura - No   Duration of headache - > 4 hrs   Time to peak intensity -   Pain scale - range of intensity - 9/10   Frequency - 5/week   Status Migrainosus history - 1-3 days   Time of day of most headaches- anytime   Associated symptoms with the headache:   Meningeal symptoms - photophobia, phonophobia, exercise intolerance +   Nausea/vomitting +   Nasal drainage   Visual blurriness +   Pallor/flushing   Dizziness   Vertigo   Confusion   Impaired concentration +   Pain worsened with physical activity +   Neck pain +   Symptoms of increased intracranial pressure:   Whooshing sounds - no   Visual spots/blotches - no   Headache Triggers: unknown   Other comorbid conditions:   Anxiety - no   Motion sickness symptom - no       Treatment history:   Resolution of headache with sleep - yes       Meds tried:   Trigger points involvin/9/15 helped for 1 day   Site: Right   Levator scapula   Pharmacological agent:   0.5% Sensorcaine solution   No complications were noted.  Supraorbital block - helped for 2 days   Tylenol - not help   imitrex - chest tightness   alleve - help   topamax - not helping   Neurontin - not helping   Paxil, Elavil - not help   seroquel - nightmare   Ketoprofen - she took it once and said her "throat " "closed up" and she's not supposed to have anything with aspirin in it.  Nortriptyline - she was taking it at night, but it didn't help her sleep, so she stopped it.  reglan - parkinsonism   zanaflex - help   Toradol 30 mg IM inj at home - too expensive   BOTOX round #1 9/3/15 - helped (R levator scapula spasm)   Round #2 12/3/15 - helped   Round#3 3/316 - helped   Round #4 6/1/16 - helped   BOTOX#5 9/15/16 - helped     BOTOX#6 - 11/30/16 - helped   BOTOX#7 - 3/9/17 - helped    BOTOX#8 - 5/25/17 - helped    BOTOX#9 8/10/17 - helped   BOTOX#10 10/19/17 - helped   BOTOX#11 12/27/17 - helped   BOTOX#12 3/7/18 - helped   BOTOX#13 5/16/18 - helped    BOTOX#14 8/1/18 - helped     BOTOX#15 10/19/18 - helped      BOTOX#16 12/28/18 - helped   BOTOX#17 3/13/19 - helped    BOTOX#18 5/23/19 - helped     BOTOX#19 8/1/19 - helped      BOTOX#20 10/11/19 - helped     BOTOX#21 12/19/19 - helped       AIMOVIG (sept 1rst week, Oct first week, Nov first wk 140 mg, Dec first wk 140 mg, Jan, Feb) no benefit   Constipation +      Can not do RFA - pt has pacemaker   Procedure: IOVERA peripheral nerve focus cold therapy (non ablative cryotherapy) 12/30/15 - not help   Indication: Cryotherapy of select peripheral nerves of the head was performed to treat chronic headaches that failed to respond to several preventive pharmacological therapies.   Sedation: None   Informed consent: The procedure, risks, benefits and options were discussed with the patient. There are no contraindications to the procedure. The patient expressed understanding and agreed to the procedure. I verify that I personally obtained the patient's consent prior to the start of the procedure.  Location:  Bilateral Supra orbital nerve (3 cycles on R and 1 cycle on L)   Bilateral Occipital nerve   3 cycles   Pain relief: Pain score reduced 10/10->0/10   9/26 Bilateral Sphenopalatine Ganglion and bilateral Maxillary Branch (Trigeminal Nerve) Block - no help   ONB - not help       "                                                                                                                                                                 lilly Pagan RN at 12/31/2014 3:54 PM                        Status: Signed                                    Expand All Collapse All   HEADACHE FASTTRACK  PAIN 7/10 NAUSEA 0/10  ROUND 1: TORADOL, IMITREX, MORPHINE, BENADRYL, AND MAGNESIUM  PAIN 7/10 NAUSEA 0/10  PT STATED SHE WAS READY TO GO HOME AND GO TO SLEEP. PT D/C'ED IN [facility]                           Shannon Pagan RN at 10/5/2015 12:49 PM                        Status: Signed                                    Expand All Collapse All   HEADACHE FASTTRACK  PAIN 8/10 NAUSEA 10/10  FIRST ROUND: tORADOL, BENADRYL, COMPAZINE, IMITREX, MAGNESIUM, AND VALPROATE.  PAIN 1/10 NAUSEA 0/10  PT READY TO GO HOME AND FEELING BETTER. PT LEFT IN NAD WITH FAMILY MEMBER  TOTAL TIME: 1431-2546                      Shannon Pagan RN at 10/20/2015 3:05 PM                        Status: Signed                                    Expand All Collapse All   HEADACHE FASTTRACK  PAIN 10/10 NAUSEA 0/10  FIRST ROUND: tORADOL, BENADRYL, COMPAZINE, IMITREX, MAGNESIUM, AND VALPROATE.  BP BEING MONITORED EVERY 15 MIN AND REMAINED 170'S/80-90'S. DR CHOPRA NOTIFIED AND STATED TO MAKE SURE BP DID NOT EXCEED 180 SYSTOLIC OR PT SHOULD REPORT TO ED. BP AT 1500 183/92. DR CHOPRA NOTIFIED AND GAVE ORDER TO STOP INFUSION AND SEND PATIENT TO ED. PT BROUGHT TO ED BY INFUSION NURSE VIA WHEELCHAIR.   PAIN 8/10 NAUSEA 0/10  TOTAL TIME: 3109-5676                                               Progress Notes                                            Shannon Pagan RN at 2/24/2016 1:36 PM       Status: Signed                  Expand All Collapse All   HEADACHE FASTTRACK  PAIN 10/10 NAUSEA 7/10  FIRST ROUND: tORADOL, BENADRYL, AND MORPHINE-BP RANGING FROM 177-203/84-92, HR 60-82  MD NOTIFIED AND GAVE ORDERS TO SEND TO ED. PT LEFT VIA W/C WITH INFUSION NURSE ON  WAY TO ED.            Headache risk factors:   H/o TBI - [facility] (2013) + neck sprain   H/o Meningitis - no   F/h Aneurysms - no       Review of Systems   Constitutional: Negative.   HENT: Negative.   Eyes: Negative.   Respiratory: Negative.   Cardiovascular: Negative.   Gastrointestinal: Negative.   Endocrine: Negative.   Genitourinary: Negative.   Musculoskeletal: Negative.   Skin: Negative.   Allergic/Immunologic: Negative.   Hematological: Negative.   Psychiatric/Behavioral: insomnia      Objective:     Physical Exam   Constitutional: She is oriented to person, place, and time. She appears well-developed.   obesity   Psychiatric: She has a normal mood and flat affect. Her behavior is normal. Judgment and thought content normal.   Headache Exam:  Optic disc is flat OU   Temples appear symmetric  No V1,V2,V3 distribution allodynia/hyperalgesia catalina   No facial swelling  No gross TMJ abnormalities   No ptosis   No conj injection   No meningismus   No neck stiffness  No C spine tenderness  Cervical facet tenderness on palpation catalina   No tender points over trapezium   catalina supraorbital nerve exit point tenderness  catalina occipital nerve exit point tenderness  No auriculotemporal nerve tenderness   Tenderness of levator scapula catalina      Gait - wide based gait   Tremor in hands, legs, voice and neck                                      ssessment:               1.  Occipital neuralgia               2.  Cervicalgia               3.  4  5  6 Chronic migraine without aura, with intractable migraine, so stated, with status migrainosus (post traumatic) post concussive?  Depression   TMJ - R sided   Insomnia - SHIRA      Head CT  Findings: There is no evidence of acute or recent major vascular territory cerebral infarction, parenchymal hemorrhage, or intra-axial mass.  There are no extra-axial masses or abnormal fluid collections.  The ventricular system is within normal limits of size for age and shows no distortion by mass-effect or  evidence of hydrocephalus.  There is no fracture or destructive osseous lesion.  The extracranial soft tissues are unremarkable.  The visualized paranasal sinuses and mastoid air cells are clear.   Impression    No acute intracranial abnormality.  ______________________________________     Electronically signed by resident: KRISSY MAYERS MD  Date: 03/14/16  Time: 17:09                                                                       Plan:               1. Prophylactic medication -   Despiramine 25 mg AM (for dizziness)   Cymbalta 120 mg daily   Aricept 20 mg daily   Neurontin 900 mg TID    Magnesium 200 mg daily  Topamax 200 mg BID   Cont B2, B6 supplements   2, Breakthrough headache - zanaflex 8 mg Q8, flurbiprofen   PRN percocet Q=30  Multiple alternative treatment options were offered to the patient   3. Refrain from over counter pain medications. Discussed medication overuse headache.cont Magnesium 400 mg PO BID   4. Occipital neuralgia - If medical management is ineffective may consider occipital nerve blocks.   5. I again urged the patient to keep a headache calender.    f/up Dr. Rock for TBI    B12 injection - 5/25/17, 12/28/18, 5/23/19  Vit D supplements    f/up sleep clinic - CPAP pending      Pain cream   B12 shot today      Cont AJOVY (April 2019- ) No side effects      ONB 2 weeks prior to next BOTOX      BOTOX was performed as an indicated therapy for intractable chronic migraine headaches given that the patient failed > 5 prophylactic medications  Botulinum Toxin Injection Procedure   Pre-operative diagnosis: Chronic migraine   Post-operative diagnosis: Same   Procedure: Chemical neurolysis   After risks and benefits were explained including bleeding, infection, worsening of pain, damage to the areas being injected, weakness of muscles, loss of muscle control, dysphagia if injecting the head or neck, facial droop if injecting the facial area, painful injection, allergic or other reaction to the  medications being injected, and the failure of the procedure to help the problem, a signed consent was obtained.   The patient was placed in a comfortable area and the sites to be treated were identified.The area to be treated was prepped three times with alcohol and the alcohol allowed to dry. Next, a 30 gauge needle was used to inject the medication in the area to be treated.   Area(s) injected:   Total Botox used: 175 Units   Botox wastage: 25 Units   Injection sites:    muscle bilaterally ( a total of 10 units divided into 2 sites)   Procerus muscle (5 units)   Frontalis muscle bilaterally (a total of 20 units divided into 4 sites)   Temporalis muscle bilaterally (a total of 50 units divided into 8 sites) + 2 sites   Occipitalis muscle bilaterally (a total of 30 units divided into 6 sites)   Cervical paraspinal muscles (a total of 20 units divided into 4 sites)   Trapezius muscle bilaterally (a total of 30 units divided into 6 sites)   Masseter 5 units catalina      Complications: none       RTC for the next Botox injection: 10 weeks

## 2020-03-10 NOTE — NURSING
Patient seen in clinic.Vitamin B 12 ordered by Dr Cortez.1,000 mcg given in right deltoid.2 Patient identifiers and allergies reviewed.Slight bleeding noted.Pressure held.Patient is on Plavix and Xarelto.Band aid applied to site.Patient waited in clinic with Nursing present.No untoward reaction noted.

## 2020-03-11 ENCOUNTER — IMMUNIZATION (OUTPATIENT)
Dept: PHARMACY | Facility: CLINIC | Age: 55
End: 2020-03-11
Payer: MEDICARE

## 2020-03-11 ENCOUNTER — PATIENT MESSAGE (OUTPATIENT)
Dept: INTERNAL MEDICINE | Facility: CLINIC | Age: 55
End: 2020-03-11

## 2020-03-12 DIAGNOSIS — G43.711 CHRONIC MIGRAINE WITHOUT AURA, WITH INTRACTABLE MIGRAINE, SO STATED, WITH STATUS MIGRAINOSUS: ICD-10-CM

## 2020-03-12 DIAGNOSIS — G47.8 UPPER AIRWAY RESISTANCE SYNDROME: ICD-10-CM

## 2020-03-16 ENCOUNTER — PATIENT MESSAGE (OUTPATIENT)
Dept: NEUROLOGY | Facility: CLINIC | Age: 55
End: 2020-03-16

## 2020-03-16 DIAGNOSIS — G47.8 UPPER AIRWAY RESISTANCE SYNDROME: ICD-10-CM

## 2020-03-16 DIAGNOSIS — G43.711 CHRONIC MIGRAINE WITHOUT AURA, WITH INTRACTABLE MIGRAINE, SO STATED, WITH STATUS MIGRAINOSUS: ICD-10-CM

## 2020-03-16 RX ORDER — ONDANSETRON 4 MG/1
4 TABLET, ORALLY DISINTEGRATING ORAL EVERY 12 HOURS PRN
Qty: 40 TABLET | Refills: 12 | Status: SHIPPED | OUTPATIENT
Start: 2020-03-16 | End: 2021-06-22 | Stop reason: SDUPTHER

## 2020-03-16 RX ORDER — ONDANSETRON 4 MG/1
TABLET, ORALLY DISINTEGRATING ORAL
Qty: 10 TABLET | Refills: 1 | OUTPATIENT
Start: 2020-03-16

## 2020-03-16 NOTE — TELEPHONE ENCOUNTER
----- Message from Charlie Lazar sent at 3/16/2020  1:23 PM CDT -----  Contact: [facility] ([place])  Rep called requesting that a pharmacy authorization be sent to the patient's pharmacy for Rx ondansetron (ZOFRAN-ODT) 4 MG TbDL    [id number] - [place], [place] - [address] AT [address] [place] [address]  Phone: [phone] Fax: [phone]

## 2020-03-17 ENCOUNTER — TELEPHONE (OUTPATIENT)
Dept: PHARMACY | Facility: CLINIC | Age: 55
End: 2020-03-17

## 2020-03-17 DIAGNOSIS — G43.711 CHRONIC MIGRAINE WITHOUT AURA, WITH INTRACTABLE MIGRAINE, SO STATED, WITH STATUS MIGRAINOSUS: ICD-10-CM

## 2020-03-18 ENCOUNTER — TELEPHONE (OUTPATIENT)
Dept: PHARMACY | Facility: CLINIC | Age: 55
End: 2020-03-18

## 2020-03-19 ENCOUNTER — TELEPHONE (OUTPATIENT)
Dept: NEUROLOGY | Facility: CLINIC | Age: 55
End: 2020-03-19

## 2020-03-23 ENCOUNTER — PATIENT MESSAGE (OUTPATIENT)
Dept: NEUROLOGY | Facility: CLINIC | Age: 55
End: 2020-03-23

## 2020-03-24 ENCOUNTER — PATIENT MESSAGE (OUTPATIENT)
Dept: INTERNAL MEDICINE | Facility: CLINIC | Age: 55
End: 2020-03-24

## 2020-03-24 ENCOUNTER — OFFICE VISIT (OUTPATIENT)
Dept: SLEEP MEDICINE | Facility: CLINIC | Age: 55
End: 2020-03-24
Payer: MEDICARE

## 2020-03-24 DIAGNOSIS — G47.33 OBSTRUCTIVE SLEEP APNEA: Primary | ICD-10-CM

## 2020-03-24 PROCEDURE — 99212 OFFICE O/P EST SF 10 MIN: CPT | Mod: 95,,, | Performed by: NURSE PRACTITIONER

## 2020-03-24 PROCEDURE — 99212 PR OFFICE/OUTPT VISIT, EST, LEVL II, 10-19 MIN: ICD-10-PCS | Mod: 95,,, | Performed by: NURSE PRACTITIONER

## 2020-03-24 NOTE — PROGRESS NOTES
The patient location is: home  The chief complaint leading to consultation is: SHIRA mgt  Visit type: Virtual visit with synchronous audio and video  Total time spent with patient: 15  Each patient to whom he or she provides medical services by telemedicine is:  (1) informed of the relationship between the physician and patient and the respective role of any other health care provider with respect to management of the patient; and (2) notified that he or she may decline to receive medical services by telemedicine and may withdraw from such care at any time.    Notes: Since cpap machine ord'd 2018 she got it from [facility] but then was hospitalized and couldn't use it. They took it back. She got new machine from [facility] but this one also taken back b/c looked like medicare was being billed for 2 machines.There was dispute filed but never heard anything back. She needs new PAP machine. Ongoing snoring, disrupted sleep then hard to return to sleep. Trazadoneup to 200mg ineffective. Sees psyche.   PPM/AICD     REVIEW OF SYSTEMS: Sleep related symptoms as per HPI.  Otherwise a balance review of 10-systems is negative.       PSG AHI 6.6/low sat 88%, 30s Vpaced    ASSESSMENT:   1. SHIRA, mild. Ready to resume PAP, machine was taken back by DME for non-compliance.Has ongoing symptoms  Medical comorbidities include: morbid obesity, HTN, CVA, CAD,chronic migraine    PLAN:   1. PSG requal (if /when + plan setup APAP 6-20cm, [facility] with adherence monitoring)

## 2020-03-24 NOTE — LETTER
March 24, 2020      Tiffany Mina MD  [address]  [facility] [address]           [id number]  [address]  [facility] [address]  Phone: [phone]          Patient: Amna Chawla   MR Number: [id number]   YOB: 1965   Date of Visit: 3/24/2020       Dear Dr. Sterling:    Thank you for referring Amna Chawla to me for evaluation. Attached you will find relevant portions of my assessment and plan of care.    If you have questions, please do not hesitate to call me. I look forward to following Amna Chawla along with you.    Sincerely,    Lorena Kaufman, NP    Enclosure  CC:  No Recipients    If you would like to receive this communication electronically, please contact [email or web address] or [phone] to request more information on [employer] access.    For providers and/or their staff who would like to refer a patient to [facility], please contact us through our one-stop-shop provider referral line, [facility] , at [phone].    If you feel you have received this communication in error or would no longer like to receive these types of communications, please e-mail [email or web address]

## 2020-03-24 NOTE — TELEPHONE ENCOUNTER
Spoke with pt - lives in apartment building which is not allowing visitors. Her daughter Silverio Dey comes to the apartment to fill pill box and make sure that pt takes medication (as she has memory issues)   I will write a letter allowing Ms Dey to enter the building 1-2 times per week to check on pt

## 2020-03-24 NOTE — LETTER
March 24, 2020      [facility]  [address]  [place] [address]  Phone: [phone]  Fax: [phone]       Patient: Amna Chawla   YOB: 1965  Date of Visit: 03/24/2020    To Whom It May Concern:    Ruben Chawla is a patient of [facility] . She has memory changes that require her to have pill boxes filled and someone to make sure that she is taking the medication. Please allow her daughter, Silverio Dey, to enter the building 1-2 times per week to fill pill box and check medication compliance. If you have any questions or concerns, or if I can be of further assistance, please do not hesitate to contact me.    Sincerely,    Mariel Tomlinson NP

## 2020-03-25 ENCOUNTER — TELEPHONE (OUTPATIENT)
Dept: SLEEP MEDICINE | Facility: OTHER | Age: 55
End: 2020-03-25

## 2020-03-26 DIAGNOSIS — F41.9 ANXIETY: ICD-10-CM

## 2020-03-26 RX ORDER — CLONAZEPAM 1 MG/1
1 TABLET ORAL DAILY PRN
Qty: 30 TABLET | Refills: 2 | Status: SHIPPED | OUTPATIENT
Start: 2020-03-26 | End: 2020-04-30 | Stop reason: SDUPTHER

## 2020-03-27 ENCOUNTER — TELEPHONE (OUTPATIENT)
Dept: ELECTROPHYSIOLOGY | Facility: CLINIC | Age: 55
End: 2020-03-27

## 2020-03-27 NOTE — TELEPHONE ENCOUNTER
Spoke with patient to cancel appointments on 4/6/2020 with SHIRLEY Wang. Patient would like a virtual video visit with Dr. Mejia. Has Zonder downloaded. States is not having cardiac/arrhythmia issues at this time.

## 2020-03-27 NOTE — TELEPHONE ENCOUNTER
Returned pt call about setting up a Virtual visit . Pt has been scheduled for this Tuesday 03/31 for 10am . Informed pt that I will reached back out on Monday to make sure everything is okay prior to appt . Pt verbalized understanding

## 2020-03-29 ENCOUNTER — PATIENT MESSAGE (OUTPATIENT)
Dept: SLEEP MEDICINE | Facility: CLINIC | Age: 55
End: 2020-03-29

## 2020-03-30 ENCOUNTER — PATIENT MESSAGE (OUTPATIENT)
Dept: NEUROLOGY | Facility: CLINIC | Age: 55
End: 2020-03-30

## 2020-03-30 ENCOUNTER — PATIENT MESSAGE (OUTPATIENT)
Dept: INTERNAL MEDICINE | Facility: CLINIC | Age: 55
End: 2020-03-30

## 2020-03-30 ENCOUNTER — PATIENT MESSAGE (OUTPATIENT)
Dept: ORTHOPEDICS | Facility: CLINIC | Age: 55
End: 2020-03-30

## 2020-03-30 ENCOUNTER — PATIENT MESSAGE (OUTPATIENT)
Dept: CARDIOLOGY | Facility: CLINIC | Age: 55
End: 2020-03-30

## 2020-03-30 ENCOUNTER — PATIENT MESSAGE (OUTPATIENT)
Dept: ELECTROPHYSIOLOGY | Facility: CLINIC | Age: 55
End: 2020-03-30

## 2020-03-31 ENCOUNTER — EXTERNAL CHRONIC CARE MANAGEMENT (OUTPATIENT)
Dept: PRIMARY CARE CLINIC | Facility: CLINIC | Age: 55
End: 2020-03-31
Payer: MEDICARE

## 2020-03-31 ENCOUNTER — PATIENT MESSAGE (OUTPATIENT)
Dept: CARDIOLOGY | Facility: CLINIC | Age: 55
End: 2020-03-31

## 2020-03-31 ENCOUNTER — OFFICE VISIT (OUTPATIENT)
Dept: ELECTROPHYSIOLOGY | Facility: CLINIC | Age: 55
End: 2020-03-31
Payer: MEDICARE

## 2020-03-31 ENCOUNTER — TELEPHONE (OUTPATIENT)
Dept: CARDIOLOGY | Facility: CLINIC | Age: 55
End: 2020-03-31

## 2020-03-31 ENCOUNTER — PATIENT MESSAGE (OUTPATIENT)
Dept: NEUROLOGY | Facility: CLINIC | Age: 55
End: 2020-03-31

## 2020-03-31 ENCOUNTER — PATIENT MESSAGE (OUTPATIENT)
Dept: ELECTROPHYSIOLOGY | Facility: CLINIC | Age: 55
End: 2020-03-31

## 2020-03-31 DIAGNOSIS — Z86.73 HISTORY OF CVA (CEREBROVASCULAR ACCIDENT): ICD-10-CM

## 2020-03-31 DIAGNOSIS — G47.33 OSA (OBSTRUCTIVE SLEEP APNEA): ICD-10-CM

## 2020-03-31 DIAGNOSIS — R60.0 LEG EDEMA: ICD-10-CM

## 2020-03-31 DIAGNOSIS — Z86.74 HISTORY OF SUDDEN CARDIAC ARREST: ICD-10-CM

## 2020-03-31 DIAGNOSIS — I48.0 PAROXYSMAL ATRIAL FIBRILLATION: ICD-10-CM

## 2020-03-31 DIAGNOSIS — I42.8 NONISCHEMIC CARDIOMYOPATHY: ICD-10-CM

## 2020-03-31 DIAGNOSIS — I10 ESSENTIAL HYPERTENSION: Chronic | ICD-10-CM

## 2020-03-31 DIAGNOSIS — Z95.810 BIVENTRICULAR AUTOMATIC IMPLANTABLE CARDIOVERTER DEFIBRILLATOR IN SITU: ICD-10-CM

## 2020-03-31 DIAGNOSIS — E66.01 OBESITY, CLASS III, BMI 40-49.9 (MORBID OBESITY): ICD-10-CM

## 2020-03-31 DIAGNOSIS — I44.2 COMPLETE AV BLOCK: Primary | ICD-10-CM

## 2020-03-31 PROCEDURE — G2058 PR CHRON CARE MGMT, EA ADDTL 20 MINS: ICD-10-PCS | Mod: S$PBB,,, | Performed by: INTERNAL MEDICINE

## 2020-03-31 PROCEDURE — G2058 CCM ADD 20MIN: HCPCS | Mod: PBBFAC,25,27 | Performed by: INTERNAL MEDICINE

## 2020-03-31 PROCEDURE — 99490 PR CHRONIC CARE MGMT, 1ST 20 MIN: ICD-10-PCS | Mod: S$PBB,,, | Performed by: INTERNAL MEDICINE

## 2020-03-31 PROCEDURE — 99490 CHRNC CARE MGMT STAFF 1ST 20: CPT | Mod: S$PBB,,, | Performed by: INTERNAL MEDICINE

## 2020-03-31 PROCEDURE — 99490 CHRNC CARE MGMT STAFF 1ST 20: CPT | Mod: PBBFAC,25,27 | Performed by: INTERNAL MEDICINE

## 2020-03-31 PROCEDURE — G2058 CCM ADD 20MIN: HCPCS | Mod: S$PBB,,, | Performed by: INTERNAL MEDICINE

## 2020-03-31 PROCEDURE — 99214 OFFICE O/P EST MOD 30 MIN: CPT | Mod: 95,,, | Performed by: INTERNAL MEDICINE

## 2020-03-31 PROCEDURE — 99214 PR OFFICE/OUTPT VISIT, EST, LEVL IV, 30-39 MIN: ICD-10-PCS | Mod: 95,,, | Performed by: INTERNAL MEDICINE

## 2020-03-31 RX ORDER — FUROSEMIDE 20 MG/1
20 TABLET ORAL DAILY
Qty: 30 TABLET | Refills: 6 | Status: SHIPPED | OUTPATIENT
Start: 2020-03-31 | End: 2021-06-22 | Stop reason: SDUPTHER

## 2020-03-31 NOTE — TELEPHONE ENCOUNTER
Notified by patient of leg edema. Prescription for lasix 20 mg daily sent in to use until the swelling is gone. BMP next  Week.

## 2020-03-31 NOTE — PROGRESS NOTES
Subjective:    Patient ID:  Amna Chawla is a 54 y.o. female who presents for follow-up of ICD follow-up    The patient location is: home  The chief complaint leading to consultation is: CRT-D follow-up, paroxysmal atrial fibrillation  Visit type: Virtual visit with synchronous audio and video  Total time spent with patient: 20 minutes  Each patient to whom he or she provides medical services by telemedicine is:  (1) informed of the relationship between the physician and patient and the respective role of any other health care provider with respect to management of the patient; and (2) notified that he or she may decline to receive medical services by telemedicine and may withdraw from such care at any time.    HPI    Prior Hx:  I had the pleasure of seeing Amna Chawla in follow-up today for her history of cardiac arrest, heart block, and ICD implantation. As you are aware, she is a 53-year old female, former patient of Dr. Humberto Martins and patient of [facility] I cared for when she initially presented in cardiac arrest as well as followed in my general cardiology fellow clinic, who was admitted to [facility] in 2/2013 after having a cardiac arrest.  She was working as a school  and collapsed at work.  On EMS arrival it was described that she was pulseless and given epinephrine (? Initially PEA) however after epinephrine noted to be in VF and was resuscitated with defibrillation/ACLS.  She underwent hypothermia protocol. Her post-arrest course was notable or intermittent 3rd-degree AV block. On 2/15/2013, a dual chamber ICD was implanted. Her EF prior to discharge was 60%. She had a negative coronary CTA during that admission.  In subsequent follow-up she underwent a pharmacologic stress ECHO in 2014 which showed a preserved LVEF and no ischemia.     Subsequent ICD interrogations show no VT, 100% RV pacing.  She was also diagnosed with symptomatic paroxysmal AF and was started on anticoagulation. She  preferred coumadin.  She noted new onset dyspnea on exertion 9/2017. We performed an echocardiogram and her EF was 40-45% in setting of chronic RV pacing. Recent PET stress showed no ischemia/infarct. We proceeded with upgrade to CRT-D which was performed on 9/27/2017.     At Ms. Chawla's 3 month post CRT-D upgrade visit she noted more energy and improvement in the shortness of breath. She noted very rare diaphragm stimulation.    Ms. Chawla presents for 6 month post-CRT upgrade follow-up in 4/2018. We planned on getting an ECHO at the 6 month chu however it was done early at the 4 month chu. Her EF improved to 45-50% on that study (see below). Her main issues are complex headaches for which she is seeing neurology.     Ms. Chawla presented for 6 month follow-up 10/2018. She was recently hospitalized for left sided weakness in setting of severe hypertension. CT head was negative for any acute ischemia/bleed. She briefly held xarelto in August 2018 for severe epistaxis and then resumed. Device interrogation noted no recent AF. Repeat ECHO 9/2018 noted EF normalized at 55-60%.     Interim Hx:  Ms. Chawla returns for follow-up. Her CRT-D is operating normally. No arrhythmias with 99% BiV pacing. She only complains of mild leg edema. She remains on xarelto for stroke prophylaxis.       Review of Systems   Constitution: Negative for fever and malaise/fatigue.   HENT: Negative for congestion and sore throat.    Eyes: Negative for blurred vision and visual disturbance.   Cardiovascular: Positive for leg swelling. Negative for chest pain, dyspnea on exertion, near-syncope, orthopnea, palpitations, paroxysmal nocturnal dyspnea and syncope.   Respiratory: Negative for cough and shortness of breath.    Hematologic/Lymphatic: Negative for bleeding problem. Does not bruise/bleed easily.   Skin: Negative.    Musculoskeletal: Negative.    Gastrointestinal: Negative for hematochezia and melena.   Neurological: Positive for  headaches. Negative for focal weakness and weakness.        Objective:      Physical exam note performed, telehealth visit  Assessment:       1. Complete AV block    2. Biventricular automatic implantable cardioverter defibrillator in situ    3. Essential hypertension    4. History of sudden cardiac arrest    5. Nonischemic cardiomyopathy from RV pacing, resolved with upgrade cardiac resynchronization therapy    6. Paroxysmal atrial fibrillation    7. History of CVA (cerebrovascular accident)    8. Obesity, Class III, BMI 40-49.9 (morbid obesity)    9. SHIRA (obstructive sleep apnea)         Plan:       In summary, Mrs. Chawla is a pleasant 53 yo cardiac arrest survivor with VF noted during arrest, no ischemic heart disease with preserved LVEF, intermittent 3rd degree AV block, and symptomatic LVEF of 40% in setting of normal PET stress and chronic RV pacing who presents for CRT-D follow-up. Doing well. EF has normalized. Continue remote checks every 3 months. No AF. Follow-up in 6 months.     Thank you for allowing me to participate in the care of this patient. Please do not hesitate to call me with any questions or concerns.     Avelino Mejia MD, PhD  Cardiac Electrophysiology

## 2020-04-01 RX ORDER — CLOPIDOGREL BISULFATE 75 MG/1
75 TABLET ORAL DAILY
Qty: 90 TABLET | Refills: 4 | Status: SHIPPED | OUTPATIENT
Start: 2020-04-01 | End: 2020-06-02

## 2020-04-02 ENCOUNTER — OFFICE VISIT (OUTPATIENT)
Dept: NEUROLOGY | Facility: CLINIC | Age: 55
End: 2020-04-02
Payer: MEDICARE

## 2020-04-02 DIAGNOSIS — D51.9 ANEMIA DUE TO VITAMIN B12 DEFICIENCY, UNSPECIFIED B12 DEFICIENCY TYPE: ICD-10-CM

## 2020-04-02 DIAGNOSIS — Z91.199 NON-COMPLIANCE: ICD-10-CM

## 2020-04-02 DIAGNOSIS — F33.1 DEPRESSION, MAJOR, RECURRENT, MODERATE: Primary | ICD-10-CM

## 2020-04-02 PROCEDURE — 99214 OFFICE O/P EST MOD 30 MIN: CPT | Mod: 95,,, | Performed by: PSYCHIATRY & NEUROLOGY

## 2020-04-02 PROCEDURE — 99214 PR OFFICE/OUTPT VISIT, EST, LEVL IV, 30-39 MIN: ICD-10-PCS | Mod: 95,,, | Performed by: PSYCHIATRY & NEUROLOGY

## 2020-04-02 NOTE — ED NOTES
on call provider, Dr. Alves, TONY Sandy ordered to requested pharmacy.   updated, to take only PRN and not scheduled.        Emilee Peter RN  [facility]          Bed: 17  Expected date:   Expected time:   Means of arrival:   Comments:

## 2020-04-02 NOTE — Clinical Note
Jesus Mina,Mrs. Chawla was on my schedule today to talk about her memory, I don't think there's anything new going on.  From what I understand talking to our nursing staff, she is a difficult patient and there have been efforts to get her set up with IM B12 injections but I'm not sure whether the plan was with you or not.  She should have level >300 and hovers below that despite reporting compliance with oral supplement, it can certainly be symptomatic below that.CV

## 2020-04-02 NOTE — PROGRESS NOTES
[facility] - NEUROLOGY  [facility], [place]    Date: April 2, 2020   Patient Name: Amna Chawla   MRN: [id number]   PCP: Tiffany Mina  Referring Provider: Toby Paredes MD    Assessment:      The patient location is: home  The chief complaint leading to consultation is: memory  Visit type: Virtual visit with synchronous audio and video  Total time spent with patient: 20 minutes  Each patient to whom he or she provides medical services by telemedicine is:  (1) informed of the relationship between the physician and patient and the respective role of any other health care provider with respect to management of the patient; and (2) notified that he or she may decline to receive medical services by telemedicine and may withdraw from such care at any time.    This is Amna Chawla, 54 y.o. female with cardiac arrest 2013 now s/p pacer with depression and anxiety resulting in variable cognitive difficulty as per neuropsychology testing 2017, no indication for repeat at this time, also with related sleep issues and B12 deficiency which are likely contributing.     Plan:      -  Referrals to psychiatry and psychology  -  IM B12    Will continue follow up with Dr. Cortez for headache management       I discussed side effects of the medications. I asked the patient to  stop the medication if She notices serious adverse effects as we discussed and to seek immediate medical attention at an ER.     Lazaro Tian MD  [facility]   Department of Neurology    Subjective:      HPI:   Ms. Amna Chawla is a 54 y.o. female who presents with a chief complaint of memory difficulty    Over the past several months she has noted difficulty recalling recent conversations and sometimes cannot recall why she has asked a person to some speak with her when they come from another room after only a few seconds.  She notes difficulty keeping up with housework citing things such as forgetting  to move clothes from the washing machine to the dryer.  She lives with her  and daughter and expresses good relationship with them.  She has been on disability since sudden cardiac arrest 2013 and has often spent time with crafts and reading but feels she is no longer able to do these things due to difficulty with concentration - notes hard time following more than a few paragraphs.  She has not driven since cardiac arrest.  She has significant dysphoric mood and often has days where she will withdraw and cry, she has sleep disturbance due to early morning awakenings.  Estimates 5 hours sleep per night with prior benefit of sleep to ambien but insurance changed her to trazodone 100mg qhs which she feels is not of benefit.  She lost follow up with psychiatry and psychology for unclear reasons.  She reports compliance with oral B12 supplements.    PAST MEDICAL HISTORY:  Past Medical History:   Diagnosis Date    Anticoagulant long-term use     Anxiety     Asthma     Atrial fibrillation     Brain anoxic injury     Cervicalgia 8/28/2014    CHI (closed head injury) 2/19/2013    Convulsion 5/30/2015    Decreased ROM of left shoulder 4/12/2017    Defibrillator activation 2013    Depression     Heart block     History of sudden cardiac arrest 2/2013    PEA arrest with subsequent long-QT    Hx of psychiatric care     Hypertension     Left atrial enlargement 4/11/2018    Pacemaker 2013    Paresthesia 11/1/2013    Prolonged Q-T interval on ECG 2/8/2013    Psychiatric problem     Seizures     Sleep difficulties     Stroke     weakness lt side    Therapy     Thyroid disease     Upper airway resistance syndrome 2/21/2017       PAST SURGICAL HISTORY:  Past Surgical History:   Procedure Laterality Date    breast reduction      CARDIAC DEFIBRILLATOR PLACEMENT      CARDIAC DEFIBRILLATOR PLACEMENT      CARPAL TUNNEL RELEASE Right     COLONOSCOPY N/A 5/7/2019    Procedure: COLONOSCOPY;  Surgeon:  Jaun Coffey MD;  [facility] ENDO (2ND FLR);  Service: Endoscopy;  Laterality: N/A;  per DR. Bustamante Pt to have balloon with DR. Coffey due to excesive looping, could not reach cecum - see telephone encounter 3/8/19 and last procedure report dated 6/24/14/ Per Piedad schedule as 90 min case- ERW  pacemaker/AICD Boitronik/   per , ok to hold Xareltox 2 days    HERNIA REPAIR      HIATAL HERNIA REPAIR      INSERT / REPLACE / REMOVE PACEMAKER  10/2017    CRT-D upgrade    TOTAL REDUCTION MAMMOPLASTY      TRIGGER FINGER RELEASE Left 8/2/2019    Procedure: RELEASE, TRIGGER FINGER left middle;  Surgeon: Matt Pugh Jr., MD;  Location: [facility] OR;  Service: Plastics;  Laterality: Left;    TRIGGER FINGER RELEASE Right 2/11/2020    Procedure: RELEASE, TRIGGER FINGER middle;  Surgeon: Matt Pugh Jr., MD;  Location: [facility] OR;  Service: Plastics;  Laterality: Right;    TUBAL LIGATION         CURRENT MEDS:  Current Outpatient Medications   Medication Sig Dispense Refill    ARIPiprazole (ABILIFY) 15 MG Tab Take 1 tablet (15 mg total) by mouth once daily. 90 tablet 1    baclofen (LIORESAL) 20 MG tablet Take 1 tablet (20 mg total) by mouth 3 (three) times daily. 90 tablet 11    blood sugar diagnostic Strp 1 strip by Misc.(Non-Drug; Combo Route) route 2 (two) times daily. 200 strip 3    blood-glucose meter kit Please provide with insurance covered meter 1 each 0    carvedilol (COREG) 25 MG tablet TAKE 1 TABLET BY MOUTH TWICE DAILY, WITH MEALS 180 tablet 3    clonazePAM (KLONOPIN) 1 MG tablet Take 1 tablet (1 mg total) by mouth daily as needed for Anxiety. 30 tablet 2    clopidogreL (PLAVIX) 75 mg tablet Take 1 tablet (75 mg total) by mouth once daily. 90 tablet 4    cyanocobalamin, vitamin B-12, 1,000 mcg Subl Place 2,000 mcg under the tongue once daily. 180 tablet 3    donepezil (ARICEPT) 10 MG tablet Take 1 tablet (10 mg total) by mouth 2 (two) times daily. 180 tablet 3    DULoxetine (CYMBALTA)  60 MG capsule Take 1 capsule (60 mg total) by mouth 2 (two) times daily. 60 capsule 11    ergocalciferol (ERGOCALCIFEROL) 50,000 unit Cap Take 1 capsule (50,000 Units total) by mouth twice a week. 24 capsule 3    flurbiprofen (ANSAID) 100 MG tablet Take 1 tablet (100 mg total) by mouth 2 (two) times daily as needed (migraine). 12 tablet 12    fremanezumab-vfrm (AJOVY) 225 mg/1.5 mL injection Inject 225 mg into the skin every 28 days. 1.5 mL 12    furosemide (LASIX) 20 MG tablet Take 1 tablet (20 mg total) by mouth once daily. 30 tablet 6    gabapentin (NEURONTIN) 300 MG capsule Take 3 capsules (900 mg total) by mouth 3 (three) times daily. 810 capsule 12    HYDROcodone-acetaminophen (NORCO) 5-325 mg per tablet Take 1 tablet by mouth every 6 (six) hours as needed for Pain. 15 tablet 0    lancets Misc 1 Device by Misc.(Non-Drug; Combo Route) route 2 (two) times daily. 200 each 3    losartan (COZAAR) 100 MG tablet Take 1 tablet (100 mg total) by mouth once daily. 90 tablet 3    magnesium 200 mg Tab Take 200 mg by mouth once daily. (Patient taking differently: Take 200 mg by mouth every other day. ) 30 each 12    metFORMIN (GLUCOPHAGE) 1000 MG tablet Take 1 tablet (1,000 mg total) by mouth 2 (two) times daily with meals. for 7 days 180 tablet 3    mupirocin (BACTROBAN) 2 % ointment Apply to affected area 3 times daily 22 g 1    ondansetron (ZOFRAN-ODT) 4 MG TbDL Take 1 tablet (4 mg total) by mouth every 12 (twelve) hours as needed (nausea). 40 tablet 12    pantoprazole (PROTONIX) 40 MG tablet Take 1 tablet (40 mg total) by mouth once daily. 90 tablet 3    rosuvastatin (CRESTOR) 40 MG Tab Take 1 tablet (40 mg total) by mouth every evening. 90 tablet 3    silver sulfADIAZINE 1% (SILVADENE) 1 % cream Apply topically 2 (two) times daily. 50 g 0    tiaGABine (GABITRIL) 4 MG tablet Take 1 tablet (4 mg total) by mouth every evening. 30 tablet 12    traZODone (DESYREL) 100 MG tablet Take 1 or 1.5 tablets by  mouth before bed as needed for insomnia 45 tablet 11    TRUE METRIX GLUCOSE METER Misc TEST BG BID  0    XARELTO 20 mg Tab TAKE 1 TABLET BY MOUTH DAILY WITH DINNER OR EVENING MEAL 30 tablet 11     Current Facility-Administered Medications   Medication Dose Route Frequency Provider Last Rate Last Dose    cyanocobalamin injection 1,000 mcg  1,000 mcg Subcutaneous Once David Cortez MD        onabotulinumtoxina injection 200 Units  200 Units Intramuscular Q90 Days David Cortez MD   200 Units at 10/19/18 1713    onabotulinumtoxina injection 200 Units  200 Units Intramuscular Q90 Days David Cortez MD   200 Units at 12/28/18 1106    onabotulinumtoxina injection 200 Units  200 Units Intramuscular Q90 Days David Cortez MD   200 Units at 03/13/19 1504    onabotulinumtoxina injection 200 Units  200 Units Intramuscular Q90 Days David Cortez MD   200 Units at 05/23/19 1123    onabotulinumtoxina injection 200 Units  200 Units Intramuscular Q90 Days David Cortez MD   200 Units at 10/11/19 1112    onabotulinumtoxina injection 200 Units  200 Units Intramuscular Q90 Days David Cortez MD   200 Units at 12/19/19 1036    onabotulinumtoxina injection 200 Units  200 Units Intramuscular Q10 weeks David Cortez MD   200 Units at 03/10/20 1036     Facility-Administered Medications Ordered in Other Visits   Medication Dose Route Frequency Provider Last Rate Last Dose    lactated ringers infusion   Intravenous Continuous Humberto Angel MD   Stopped at 02/11/20 0801    lidocaine (PF) 10 mg/ml (1%) injection 5 mg  0.5 mL Intradermal Once Humberto Angel MD           ALLERGIES:  Review of patient's allergies indicates:   Allergen Reactions    Aspirin Hives    Imitrex [sumatriptan] Palpitations    Penicillins Hives and Swelling    Shellfish containing products Anaphylaxis     seafood    Percocet [oxycodone-acetaminophen] Itching     States can take Hydrocodone    Reglan [metoclopramide hcl] Other (See Comments)      Parkinsonism        FAMILY HISTORY:  Family History   Problem Relation Age of Onset    Hypertension Mother     COPD Unknown     Heart failure Unknown     Glaucoma Maternal Grandmother     Glaucoma Paternal Grandmother        SOCIAL HISTORY:  Social History     Tobacco Use    Smoking status: Never Smoker    Smokeless tobacco: Never Used   Substance Use Topics    Alcohol use: No     Frequency: 2-4 times a month     Drinks per session: 1 or 2     Binge frequency: Never    Drug use: No       Review of Systems:  12 review of systems is negative except for the symptoms mentioned in HPI.        Objective:   There were no vitals filed for this visit.    General: NAD, well nourished   Eyes: no tearing, discharge, no erythema   ENT: moist mucous membranes of the oral cavity, nares patent    Neck: Supple, full range of motion  Cardiovascular: Warm and well perfused, pulses equal and symmetrical  Lungs: Normal work of breathing, normal chest wall excursions  Skin: No rash, lesions, or breakdown on exposed skin  Psychiatry: Mood and affect are appropriate   Abdomen: soft, non tender, non distended  Extremeties: No cyanosis, clubbing or edema.    Neurological   MENTAL STATUS: Alert and oriented to person, place, and time. Attention and concentration within normal limits. Speech without dysarthria, able to name and repeat without difficulty. Recent and remote memory within normal limits   CRANIAL NERVES: Visual fields intact. PERRL. EOMI. Facial sensation intact. Face symmetrical. Hearing grossly intact. Full shoulder shrug bilaterally. Tongue protrudes midline   SENSORY: Negative Romberg.   MOTOR: Normal bulk and tone. No pronator drift.   REFLEXES: MAIK  CEREBELLAR/COORDINATION/GAIT: Finger to nose intact.

## 2020-04-02 NOTE — LETTER
April 2, 2020      Toby Paredes MD  [address]  [facility] [address]           [facility]  Neurology  [address]  [facility] [address]  Phone: [phone]  Fax: [phone]          Patient: Amna Chawla   MR Number: [id number]   YOB: 1965   Date of Visit: 4/2/2020       Dear Dr. Toby Paredes:    Thank you for referring Amna Chawla to me for evaluation. Attached you will find relevant portions of my assessment and plan of care.    If you have questions, please do not hesitate to call me. I look forward to following Amna Chawla along with you.    Sincerely,    Lazaro Tian MD    Enclosure  CC:  No Recipients    If you would like to receive this communication electronically, please contact [email or web address] or [phone] to request more information on [employer] access.    For providers and/or their staff who would like to refer a patient to [facility], please contact us through our one-stop-shop provider referral line, [facility] , at [phone].    If you feel you have received this communication in error or would no longer like to receive these types of communications, please e-mail [email or web address]

## 2020-04-03 ENCOUNTER — PATIENT MESSAGE (OUTPATIENT)
Dept: INTERNAL MEDICINE | Facility: CLINIC | Age: 55
End: 2020-04-03

## 2020-04-07 ENCOUNTER — PATIENT MESSAGE (OUTPATIENT)
Dept: CARDIOLOGY | Facility: CLINIC | Age: 55
End: 2020-04-07

## 2020-04-07 ENCOUNTER — LAB VISIT (OUTPATIENT)
Dept: LAB | Facility: HOSPITAL | Age: 55
End: 2020-04-07
Attending: INTERNAL MEDICINE
Payer: MEDICARE

## 2020-04-07 DIAGNOSIS — R60.0 LEG EDEMA: ICD-10-CM

## 2020-04-07 LAB
ANION GAP SERPL CALC-SCNC: 6 MMOL/L (ref 8–16)
BUN SERPL-MCNC: 17 MG/DL (ref 6–20)
CALCIUM SERPL-MCNC: 8.9 MG/DL (ref 8.7–10.5)
CHLORIDE SERPL-SCNC: 104 MMOL/L (ref 95–110)
CO2 SERPL-SCNC: 26 MMOL/L (ref 23–29)
CREAT SERPL-MCNC: 1 MG/DL (ref 0.5–1.4)
EST. GFR  (AFRICAN AMERICAN): >60 ML/MIN/1.73 M^2
EST. GFR  (NON AFRICAN AMERICAN): >60 ML/MIN/1.73 M^2
GLUCOSE SERPL-MCNC: 130 MG/DL (ref 70–110)
POTASSIUM SERPL-SCNC: 3.8 MMOL/L (ref 3.5–5.1)
SODIUM SERPL-SCNC: 136 MMOL/L (ref 136–145)

## 2020-04-07 PROCEDURE — 80048 BASIC METABOLIC PNL TOTAL CA: CPT

## 2020-04-07 PROCEDURE — 36415 COLL VENOUS BLD VENIPUNCTURE: CPT

## 2020-04-15 ENCOUNTER — TELEPHONE (OUTPATIENT)
Dept: CARDIOLOGY | Facility: HOSPITAL | Age: 55
End: 2020-04-15

## 2020-04-15 NOTE — TELEPHONE ENCOUNTER
Remote device interrogation today indicating an abrupt rise in LV lead impedance over the past 4 days, now >3000 Ohms. Pt last had a virtual EP clinic visit 3/31/20 and her only complaint at the time was mild leg edema. No EKG for that visit, however, device report indicates LV lead capture threshold is WNL.  Pt notes some fatigue.  Discussed with Dr. Hallman (covering for Dr. Mejia) who advised that we monitor the impedance to be sure this isn't an anomaly. Daily impedance measurements were reviewed over the past week:  4/10/20 - 492 Ohms, 4/11/20 - 492 Ohms, 4/12/20 - 434 Ohms, 4/13/20 - 1027 Ohms, 4/14/20 - 405 Ohms, 4/15/20 - >3000 Ohms.  Will plan to review daily transmissions for the next few days and report back to MD.

## 2020-04-15 NOTE — TELEPHONE ENCOUNTER
Notified patient by phone that the physician recommendation is to monitor her LV lead impedances for the next few days to see if it will normalize.

## 2020-04-15 NOTE — TELEPHONE ENCOUNTER
----- Message from Kell Woo RN sent at 4/15/2020  4:35 PM CDT -----  Contact: Patient  Jesus Vargas,    Did you call her?    Kell     ----- Message -----  From: Traci Martinez  Sent: 4/15/2020   4:24 PM CDT  To: Roberto ABREU Staff    The Pt is returning a call. Please call her back @ [phone]. Thanks, Traci

## 2020-04-21 ENCOUNTER — PATIENT MESSAGE (OUTPATIENT)
Dept: INTERNAL MEDICINE | Facility: CLINIC | Age: 55
End: 2020-04-21

## 2020-04-22 ENCOUNTER — OFFICE VISIT (OUTPATIENT)
Dept: INTERNAL MEDICINE | Facility: CLINIC | Age: 55
End: 2020-04-22
Payer: MEDICARE

## 2020-04-22 ENCOUNTER — TELEPHONE (OUTPATIENT)
Dept: INTERNAL MEDICINE | Facility: CLINIC | Age: 55
End: 2020-04-22

## 2020-04-22 DIAGNOSIS — R53.83 FATIGUE, UNSPECIFIED TYPE: ICD-10-CM

## 2020-04-22 DIAGNOSIS — R73.03 PREDIABETES: ICD-10-CM

## 2020-04-22 DIAGNOSIS — E53.8 B12 DEFICIENCY: Primary | ICD-10-CM

## 2020-04-22 PROCEDURE — 99213 PR OFFICE/OUTPT VISIT, EST, LEVL III, 20-29 MIN: ICD-10-PCS | Mod: 95,,, | Performed by: PHYSICIAN ASSISTANT

## 2020-04-22 PROCEDURE — 99213 OFFICE O/P EST LOW 20 MIN: CPT | Mod: 95,,, | Performed by: PHYSICIAN ASSISTANT

## 2020-04-22 RX ORDER — CYANOCOBALAMIN 1000 UG/ML
1000 INJECTION, SOLUTION INTRAMUSCULAR; SUBCUTANEOUS
Status: COMPLETED | OUTPATIENT
Start: 2020-04-22 | End: 2020-05-04

## 2020-04-22 NOTE — PATIENT INSTRUCTIONS
Vitamin B-12  Does this test have other names?  VB12, serum cobalamin  What is this test?  This test measures the level of vitamin B-12 in your blood. You need this vitamin to make red blood cells and for your nervous system to function as it should.  You get vitamin B-12 from eating foods that come from animals, such as meat, eggs, and dairy products. Vitamin B-12 is also added to some cereals. You can also take this vitamin as a supplement in pill form.  Why do I need this test?  You may need this test if your healthcare provider suspects that your vitamin B-12 level is low. A low level of vitamin B-12 is called vitamin B-12 deficiency. You are more likely to have vitamin deficiency if you are an older adult, have a digestive disorder called malabsorption, have had gastrointestinal surgery, or eat a vegan diet. Women who are pregnant or breastfeeding and eat a vegetarian-type diet are at high risk for this deficiency.  You may also need this test if you've been diagnosed with or your healthcare provider suspects a disease called pernicious anemia. Pernicious anemia affects the lining of your stomach and makes it hard to absorb vitamin B-12.  These are common symptoms of vitamin B-12 deficiency:  · Fatigue  · Weakness  · Weight loss  · Tingling or numbness of the hands and feet  · Constipation  · Poor balance  · Confusion  · Depression  · Memory loss  · Soreness of the mouth or tongue  What other tests might I have along with this test?  Your healthcare provider may order other tests to help find out the cause of your vitamin B-12 deficiency. These tests may include:  · Complete blood count  · Peripheral blood smear, which involves looking at your blood cells under a microscope  · Folic acid level. This vitamin is also important for red blood cell production.  What do my test results mean?  Many things may affect your lab test results. These include the method each lab uses to do the test. Even if your test  results are different from the normal value, you may not have a problem. To learn what the results mean for you, talk with your healthcare provider.  Vitamin B-12 is measured in picograms per milliliter (pg/mL). Normal results are:  · 200 to 835 pg/mL for adults  · 160 to 1,300 pg/mL for newborns  If your results are low, you may have:  · Pernicious anemia  · Malabsorption from inflammatory bowel disease or other causes  · Poor absorption because of surgery  · Tapeworm infection  · Too little intake of animal protein  · Folic acid deficiency  · Iron deficiency  If your levels are high, you may have:  · Liver or kidney disease  · Diabetes  · Obesity  · White blood cell cancer  High levels may also mean that you have chronic obstructive pulmonary disease , congestive heart failure, or a thickening of the blood called polycythemia vera.  How is this test done?  The test requires a blood sample, which is drawn through a needle from a vein in your arm.  Does this test pose any risks?  Taking a blood sample with a needle carries risks that include bleeding, infection, bruising, or feeling dizzy. When the needle pricks your arm, you may feel a slight stinging sensation or pain. Afterward, the site may be slightly sore.  What might affect my test results?  Certain conditions may affect your test results. These include:  · Pregnancy  · Recent blood transfusions  · Smoking  Medicines in general may also affect your results. Specific medicines include supplements of vitamin A or C and birth control pills.  How do I get ready for this test?  You must fast before this test. You may be able to drink water, but you should not eat or drink anything else after midnight on the night before the test. If you are not having the test in the morning, ask your healthcare provider how long you need to fast before the test. You should not have a vitamin B-12 injection before the test. Also be sure your provider knows about all medicines,  herbs, vitamins, and supplements you are taking. This includes medicines that don't need a prescription and any illicit drugs you may use.  © 5988-2471 [employer] [address] [place] [address], [place] [address]. All rights reserved. This information is not intended as a substitute for professional medical care. Always follow your healthcare professional's instructions.

## 2020-04-22 NOTE — PROGRESS NOTES
"Subjective:       Patient ID: Amna Chawla is a 54 y.o. female.        Chief Complaint: Fatigue    Amna Chawla is an established patient of Tiffany Mina MD here today for telemedicine visit.    No energy, feeling like she just wants to stay in bed.  Started about 2 weeks ago.  Usually does a lot of crafting but feels no interest to do it for the past few weeks.  No crying or down feeling.  Feels like her anxiety is doing okay but needing her klonopin daily.  Sits on her balcony but otherwise not leaving the house.  No chest pain, shortness of breath, cough, fever, nausea/vomiting, or diarrhea.  No urinary sx.  Feels like "every now and then I get like this," in past has been because her vitamin B12 was low.  Not taking trazodone at night because it's not helping.  She had lab work last month showing low B12 despite taking supplement, unchanged hemoglobin A1C, stable H/H, normal TSH, stable CMP.      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 Cortez until 6/2020, would " like to get it sooner    Vitamin d def - taking vitamin d 2/week    The patient location is: [place]  The chief complaint leading to consultation is: no energy  Visit type: audiovisual  Total time spent with patient: 25 minutes  Each patient to whom he or she provides medical services by telemedicine is:  (1) informed of the relationship between the physician and patient and the respective role of any other health care provider with respect to management of the patient; and (2) notified that he or she may decline to receive medical services by telemedicine and may withdraw from such care at any time.           Review of Systems   Constitutional: Negative for chills, diaphoresis, fatigue and fever.   HENT: Negative for congestion and sore throat.    Eyes: Negative for visual disturbance.   Respiratory: Negative for cough, chest tightness and shortness of breath.    Cardiovascular: Negative for chest pain, palpitations and leg swelling.   Gastrointestinal: Negative for abdominal pain, blood in stool, constipation, diarrhea, nausea and vomiting.   Genitourinary: Negative for dysuria, frequency, hematuria and urgency.   Musculoskeletal: Negative for arthralgias and back pain.   Skin: Negative for rash.   Neurological: Negative for dizziness, syncope, weakness and headaches.   Psychiatric/Behavioral: Negative for dysphoric mood and sleep disturbance. The patient is not nervous/anxious.        Objective:      Physical Exam   Constitutional: She appears well-developed and well-nourished. No distress.   HENT:   Head: Normocephalic and atraumatic.   Right Ear: External ear normal.   Left Ear: External ear normal.   Neck: Neck supple.   Pulmonary/Chest: Effort normal.   Neurological: She is alert.   Skin: She is not diaphoretic.   Psychiatric: She has a normal mood and affect.       Assessment:       1. B12 deficiency    2. Fatigue, unspecified type    3. Prediabetes        Plan:       Amna was seen today for  "fatigue.    Diagnoses and all orders for this visit:    B12 deficiency - schedule B12 injection  -     cyanocobalamin injection 1,000 mcg    Fatigue, unspecified type - lab work from last month reviewed and acceptable, in past has been associated with low B12 level, will schedule injection as above, if not improving consider further work up, to let me know if not improving or if other sx develop    Prediabetes - home blood sugar showing good control, continue metformin    She has f/u next month with Dr. Mina.    Pt has been given instructions populated from [employer] database and has verbalized understanding of the after visit summary and information contained wherein.    Follow up with a primary care provider. May go to ER for acute shortness of breath, lightheadedness, fever, or any other emergent complaints or changes in condition.    "This note will be shared with the patient"    Future Appointments   Date Time Provider Department Center   4/23/2020  9:00 AM NURSE, [facility] INTERNAL MEDICINE [facility]   5/25/2020  8:30 AM Tiffany Mina MD [facility]   5/31/2020 10:00 AM HOME MONITOR DEVICE CHECK, [facility] BR[facility]ON [facility]               "

## 2020-04-24 ENCOUNTER — TELEPHONE (OUTPATIENT)
Dept: CARDIOLOGY | Facility: HOSPITAL | Age: 55
End: 2020-04-24

## 2020-04-24 ENCOUNTER — PATIENT MESSAGE (OUTPATIENT)
Dept: INTERNAL MEDICINE | Facility: CLINIC | Age: 55
End: 2020-04-24

## 2020-04-24 NOTE — TELEPHONE ENCOUNTER
LV lead impedance >3000. Prev orders for device staff was to monitor LV lead. Periodic egrams noted with CRT interrupted and possible noise.Message sent to     @ 1100 Verbal order received from  to recheck LV lead in all configurations hx of AVB.    Called and spoke with pt. Reports infrequent Phrenic nerve stimulation. Pt will come to clinic 4/28/2020 at 1pm for testing.  has afternoon clinic and is avail for device questions. She is aware of visitor policy but asks someone call her daughter with the results/plan.

## 2020-04-28 ENCOUNTER — CLINICAL SUPPORT (OUTPATIENT)
Dept: CARDIOLOGY | Facility: HOSPITAL | Age: 55
End: 2020-04-28
Attending: INTERNAL MEDICINE
Payer: MEDICARE

## 2020-04-28 DIAGNOSIS — Z95.810 AICD (AUTOMATIC CARDIOVERTER/DEFIBRILLATOR) PRESENT: ICD-10-CM

## 2020-04-28 DIAGNOSIS — T82.110A PACEMAKER LEAD MALFUNCTION, INITIAL ENCOUNTER: Primary | ICD-10-CM

## 2020-04-28 PROCEDURE — 93284 PRGRMG EVAL IMPLANTABLE DFB: CPT | Mod: 26,,, | Performed by: INTERNAL MEDICINE

## 2020-04-28 PROCEDURE — 93284 CARDIAC DEVICE CHECK - IN CLINIC & HOSPITAL: ICD-10-PCS | Mod: 26,,, | Performed by: INTERNAL MEDICINE

## 2020-04-28 PROCEDURE — 93284 PRGRMG EVAL IMPLANTABLE DFB: CPT

## 2020-04-30 ENCOUNTER — OFFICE VISIT (OUTPATIENT)
Dept: PSYCHIATRY | Facility: CLINIC | Age: 55
End: 2020-04-30
Payer: MEDICARE

## 2020-04-30 ENCOUNTER — EXTERNAL CHRONIC CARE MANAGEMENT (OUTPATIENT)
Dept: PRIMARY CARE CLINIC | Facility: CLINIC | Age: 55
End: 2020-04-30
Payer: MEDICARE

## 2020-04-30 DIAGNOSIS — G47.00 INSOMNIA, UNSPECIFIED TYPE: Primary | ICD-10-CM

## 2020-04-30 DIAGNOSIS — F41.9 ANXIETY: ICD-10-CM

## 2020-04-30 DIAGNOSIS — F33.1 DEPRESSION, MAJOR, RECURRENT, MODERATE: Primary | ICD-10-CM

## 2020-04-30 DIAGNOSIS — G47.00 INSOMNIA, UNSPECIFIED TYPE: ICD-10-CM

## 2020-04-30 DIAGNOSIS — F33.1 DEPRESSION, MAJOR, RECURRENT, MODERATE: ICD-10-CM

## 2020-04-30 PROCEDURE — 99490 CHRNC CARE MGMT STAFF 1ST 20: CPT | Mod: PBBFAC | Performed by: INTERNAL MEDICINE

## 2020-04-30 PROCEDURE — 99490 CHRNC CARE MGMT STAFF 1ST 20: CPT | Mod: S$PBB,,, | Performed by: INTERNAL MEDICINE

## 2020-04-30 PROCEDURE — 90834 PR PSYCHOTHERAPY W/PATIENT, 45 MIN: ICD-10-PCS | Mod: 95,,, | Performed by: SOCIAL WORKER

## 2020-04-30 PROCEDURE — 99443 PR PHYSICIAN TELEPHONE EVALUATION 21-30 MIN: ICD-10-PCS | Mod: 95,,, | Performed by: NURSE PRACTITIONER

## 2020-04-30 PROCEDURE — 99443 PR PHYSICIAN TELEPHONE EVALUATION 21-30 MIN: CPT | Mod: 95,,, | Performed by: NURSE PRACTITIONER

## 2020-04-30 PROCEDURE — 99490 PR CHRONIC CARE MGMT, 1ST 20 MIN: ICD-10-PCS | Mod: S$PBB,,, | Performed by: INTERNAL MEDICINE

## 2020-04-30 PROCEDURE — 90834 PSYTX W PT 45 MINUTES: CPT | Mod: 95,,, | Performed by: SOCIAL WORKER

## 2020-04-30 RX ORDER — CLONAZEPAM 1 MG/1
1 TABLET ORAL DAILY PRN
Qty: 30 TABLET | Refills: 3 | Status: SHIPPED | OUTPATIENT
Start: 2020-04-30 | End: 2020-09-28 | Stop reason: SDUPTHER

## 2020-04-30 RX ORDER — ZOLPIDEM TARTRATE 10 MG/1
10 TABLET ORAL NIGHTLY PRN
Qty: 30 TABLET | Refills: 3 | Status: SHIPPED | OUTPATIENT
Start: 2020-04-30 | End: 2020-09-28 | Stop reason: SDUPTHER

## 2020-04-30 RX ORDER — ARIPIPRAZOLE 15 MG/1
15 TABLET ORAL DAILY
Qty: 90 TABLET | Refills: 1 | Status: SHIPPED | OUTPATIENT
Start: 2020-04-30 | End: 2020-10-16 | Stop reason: SDUPTHER

## 2020-04-30 RX ORDER — DULOXETIN HYDROCHLORIDE 60 MG/1
60 CAPSULE, DELAYED RELEASE ORAL 2 TIMES DAILY
Qty: 180 CAPSULE | Refills: 1 | Status: SHIPPED | OUTPATIENT
Start: 2020-04-30 | End: 2020-10-12

## 2020-04-30 NOTE — PROGRESS NOTES
Individual Psychotherapy (PhD/LCSW)    4/30/2020    Site:  [facility]         Therapeutic Intervention: Met with patient.  Outpatient - Insight oriented psychotherapy 45 min - CPT code 08645    Chief complaint/reason for encounter: depression     Interval history and content of current session: The patient location is: home  The chief complaint leading to consultation is: depression  Visit type: audiovisual  Total time spent with patient: 40 minutes  Each patient to whom he or she provides medical services by telemedicine is:  (1) informed of the relationship between the physician and patient and the respective role of any other health care provider with respect to management of the patient; and (2) notified that he or she may decline to receive medical services by telemedicine and may withdraw from such care at any time.    Notes: Pt has not been seen since last June.  She states she is depressed and frustrated since the quarantine began since she is used to going places and staing busy and doing crafts.  It is difficult for her to find enough to do at home all the time.  Her 31 year old daughter has moved in with her with her 4 year son.  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  Pt's 12 year old daughter is there also.  Pt still is complaining of insomnia and waking up a 2 am or so and being unable to fall back asleep.  She either watches TV or listens to music.  She states that she is having bad moods come over her with no obvious stimulus.  She has yelled at her daughters and said really mean things to them that she regrets later.  She wants to get her medication straight so that she can sleep.  She needs to have one of the wires in her pacemaker and defibrillator replaced but will have to wait until June or July to get that done.  She complains of feeling tired all the time and having a poor memory.  Discussed ways to cope with  "her frustration and feeling "cooped up".   Treatment plan:  · Target symptoms: depression  · Why chosen therapy is appropriate versus another modality: relevant to diagnosis  · Outcome monitoring methods: self-report, observation  · Therapeutic intervention type: insight oriented psychotherapy, supportive psychotherapy    Risk parameters:  Patient reports no suicidal ideation  Patient reports no homicidal ideation  Patient reports no self-injurious behavior  Patient reports no violent behavior    Verbal deficits: None    Patient's response to intervention:  The patient's response to intervention is motivated.    Progress toward goals and other mental status changes:  The patient's progress toward goals is fair .    Diagnosis:     ICD-10-CM ICD-9-CM   1. Insomnia, unspecified type G47.00 780.52   2. Depression, major, recurrent, moderate F33.1 296.32       Plan:  individual psychotherapy, group psychotherapy and medication management by physician    Return to clinic: 1 month  "

## 2020-04-30 NOTE — PROGRESS NOTES
"Outpatient Psychiatry Follow-Up Visit (MD/NP)    4/30/2020    Clinical Status of Patient:  Outpatient (Ambulatory)    Chief Complaint:  Amna Chawla is a 54 y.o. female who presents today for follow-up of depression and anxiety.  Met with patient.      Last Visit:  was on 8/20/19. Chart and  reviewed.   The patient location is: home  The chief complaint leading to consultation is: anxiety and  mood  Visit type: audio only  Total time spent with patient: 25 minutes  Each patient to whom he or she provides medical services by telemedicine is:  (1) informed of the relationship between the physician and patient and the respective role of any other health care provider with respect to management of the patient; and (2) notified that he or she may decline to receive medical services by telemedicine and may withdraw from such care at any time.    Interval History and Content of Current Session:  Current Medication Profile for Psych  · Continue Cymbalta 60 mg po BID  · Increase to Abilify 15 mg po daily  · DC Ambien (not covered by insurance and Zaleplon not effective)  · Start Trazadone 100-150 mg po q hs PRN insomnia  · Continue  Atarx (hydroxyzine) 50 mg po q hs PRN insomnia and start Atarax 10 mg po TID PRN anxiety  · Continue Klonopin 1 mg po daily PRN severe anxiety  · Also taking Neurontin and Topamax from Neurology    Phone Conference:  Pt reports mood is "terrible".  Reports anxiety and trouble coping with coronavirus pandemic stressors.  Pt concerned with insomnia.  Stated, "I take 2 Trazodone and I'm still up all night.".  Reports average of 3-4 hours sleep and not napping during the day.  Failed on Sonata and Trazodone. Will reorder Ambien.     Denies SI/HI/AVH. Denies side effects to medications; no EPS.     Psychotherapy:  · Target symptoms: depression, anxiety   · Why chosen therapy is appropriate versus another modality: relevant to diagnosis  · Outcome monitoring methods: self-report, " observation  · Therapeutic intervention type: insight oriented psychotherapy, CBT  · Topics discussed/themes: relationships difficulties, parenting issues, stress related to medical comorbidities, difficulty managing affect in interpersonal relationships, building skills sets for symptom management, symptom recognition  · The patient's response to the intervention is accepting. The patient's progress toward treatment goals is fair.   · Duration of intervention: 10 minutes.    Review of Systems   · PSYCHIATRIC: Pertinant items are noted in the narrative.  · CONSTITUTIONAL: No weight gain or loss.   · ENDOCRINE: No polydipsia or polyuria.  · INTEGUMENTARY: No rashes or lacerations.  · EYES: No exophthalmos, jaundice or blindness.  · ENT: No dizziness, tinnitus or hearing loss.  · RESPIRATORY: No shortness of breath.  · GASTROINTESTINAL: No nausea, vomiting, pain, constipation or diarrhea.  · GENITOURINARY: No frequency, dysuria or sexual dysfunction.  · HEMATOLOGIC/LYMPHATIC: No excessive bleeding, prolonged or excessive bleeding after dental extraction/injury.  · ALLERGIC/IMMUNOLOGIC: No allergic response to materials, foods or animals at this time.    Past Medical, Family and Social History: The patient's past medical, family and social history have been reviewed and updated as appropriate within the electronic medical record - see encounter notes.    Compliance: yes    Side effects: None    Risk Parameters:  Patient reports no suicidal ideation  Patient reports no homicidal ideation  Patient reports no self-injurious behavior  Patient reports no violent behavior    Exam (detailed: at least 9 elements; comprehensive: all 15 elements)   Constitutional  Vitals:  Most recent vital signs, dated less than 90 days prior to this appointment, were reviewed.   There were no vitals filed for this visit.     General:  NA     Musculoskeletal  Muscle Strength/Tone:  not examined   Gait & Station:  NA     Psychiatric  Speech:  no  latency; no press   Mood & Affect:  dysthymic, irritable  irritable   Thought Process:  normal and logical   Associations:  intact   Thought Content:  normal, no suicidality, no homicidality, delusions, or paranoia   Insight:  has awareness of illness   Judgement: behavior is adequate to circumstances, age appropriate   Orientation:  grossly intact, person, place, situation, time/date   Memory: intact for content of interview, able to remember recent events- yes, able to remember remote events- yes   Language: grossly intact   Attention Span & Concentration:  able to focus   Fund of Knowledge:  intact and appropriate to age and level of education, familiar with aspects of current personal life     Assessment and Diagnosis   Status/Progress: Based on the examination today, the patient's problem(s) is/are treatment resistant.  New problems have not been presented today.   Co-morbidities and Lack of compliance are not complicating management of the primary condition.  There are no active rule-out diagnoses for this patient at this time.     General Impression:       ICD-10-CM ICD-9-CM   1. Depression, major, recurrent, moderate F33.1 296.32   2. Insomnia, unspecified type G47.00 780.52   3. Anxiety F41.9 300.00       Intervention/Counseling/Treatment Plan   · Medication Management: The risks and benefits of medication were discussed with the patient.   · Continue Cymbalta 60 mg po BID  · Continue Abilify 15 mg po daily  · Reorder Ambien 10 mg po q sh PRN insomnia (Trazodone and  Zaleplon not effective)  · Hold Trazadone 100-150 mg po q hs PRN insomnia  · Continue  Atarx (hydroxyzine) 50 mg po q hs PRN insomnia and start Atarax 10 mg po TID PRN anxiety  · Continue Klonopin 1 mg po daily PRN severe anxiety    Return to Clinic: 3 months     Risks, benefits, side effects and alternative treatments discussed with patient. Patient agrees with the current plan as documented.  Encouraged Patient to keep future  appointments.  Take medications as prescribed and abstain from substance abuse.  Pt to present to ED for thoughts to harm herself or others

## 2020-05-04 ENCOUNTER — PATIENT MESSAGE (OUTPATIENT)
Dept: INTERNAL MEDICINE | Facility: CLINIC | Age: 55
End: 2020-05-04

## 2020-05-04 ENCOUNTER — CLINICAL SUPPORT (OUTPATIENT)
Dept: INTERNAL MEDICINE | Facility: CLINIC | Age: 55
End: 2020-05-04
Payer: MEDICARE

## 2020-05-04 ENCOUNTER — TELEPHONE (OUTPATIENT)
Dept: INTERNAL MEDICINE | Facility: CLINIC | Age: 55
End: 2020-05-04

## 2020-05-04 PROCEDURE — 96372 THER/PROPH/DIAG INJ SC/IM: CPT | Mod: PBBFAC

## 2020-05-04 RX ORDER — CYANOCOBALAMIN 1000 UG/ML
100 INJECTION, SOLUTION INTRAMUSCULAR; SUBCUTANEOUS
Status: DISCONTINUED | OUTPATIENT
Start: 2020-05-04 | End: 2020-09-14 | Stop reason: CLARIF

## 2020-05-04 RX ADMIN — CYANOCOBALAMIN 1000 MCG: 1000 INJECTION, SOLUTION INTRAMUSCULAR at 04:05

## 2020-05-05 ENCOUNTER — TELEPHONE (OUTPATIENT)
Dept: INTERNAL MEDICINE | Facility: CLINIC | Age: 55
End: 2020-05-05

## 2020-05-05 NOTE — TELEPHONE ENCOUNTER
----- Message from Raquel Andrade sent at 5/5/2020  4:08 PM CDT -----  Contact: [phone]  Patient is requesting a call from the nurse regarding her bel  for B12 injection on 05-06-20.  Patient stated she just had one on 05-04-20.    Please advise, thank you.

## 2020-05-11 ENCOUNTER — TELEPHONE (OUTPATIENT)
Dept: ELECTROPHYSIOLOGY | Facility: CLINIC | Age: 55
End: 2020-05-11

## 2020-05-11 NOTE — TELEPHONE ENCOUNTER
----- Message from Carol Martinez MA sent at 5/8/2020  4:38 PM CDT -----  Contact: Pt.  Jesus Castorena     While on the phone with  she stated that a procedure was mention but isn't sure of the details. I do not see anything noted in chart concerning this matter . Can you reach out to ? Pt stated we can call on Monday .      ----- Message -----  From: Julissa Altman  Sent: 5/8/2020   1:27 PM CDT  To: Carol Martinez MA        ----- Message -----  From: Sunil Lee MA  Sent: 5/8/2020  12:31 PM CDT  To: Roberto ABREU Staff    Please call pt. She would like to talk to you about x-ray appt sched on may 18,2020 Thank you [phone]

## 2020-05-11 NOTE — TELEPHONE ENCOUNTER
Spoke to Ms. Chawla.  Let her know the appt she has on 5/18/2020 is for just an X-ray.  Advised of current visitor policy (no visitors allowed).  Also let her know I have her on my procedure list to schedule venogram once we are able to schedule elective procedures.  She verbalizes understanding and ask for Venogram to be in July.  Let her know we will touch base regarding this once we are able to start scheduling.    Ms. Chawla verbalizes understanding and will call our office with any further questions or concerns.

## 2020-05-12 ENCOUNTER — PATIENT MESSAGE (OUTPATIENT)
Dept: NEUROLOGY | Facility: CLINIC | Age: 55
End: 2020-05-12

## 2020-05-12 ENCOUNTER — TELEPHONE (OUTPATIENT)
Dept: PHARMACY | Facility: CLINIC | Age: 55
End: 2020-05-12

## 2020-05-18 ENCOUNTER — HOSPITAL ENCOUNTER (OUTPATIENT)
Dept: RADIOLOGY | Facility: HOSPITAL | Age: 55
Discharge: HOME | End: 2020-05-18
Attending: INTERNAL MEDICINE
Payer: MEDICARE

## 2020-05-18 DIAGNOSIS — T82.110A PACEMAKER LEAD MALFUNCTION, INITIAL ENCOUNTER: ICD-10-CM

## 2020-05-18 DIAGNOSIS — Z95.810 AICD (AUTOMATIC CARDIOVERTER/DEFIBRILLATOR) PRESENT: ICD-10-CM

## 2020-05-18 PROCEDURE — 71046 XR CHEST PA AND LATERAL: ICD-10-PCS | Mod: 26,,, | Performed by: RADIOLOGY

## 2020-05-18 PROCEDURE — 71046 X-RAY EXAM CHEST 2 VIEWS: CPT | Mod: 26,,, | Performed by: RADIOLOGY

## 2020-05-18 PROCEDURE — 71046 X-RAY EXAM CHEST 2 VIEWS: CPT | Mod: TC

## 2020-05-19 ENCOUNTER — PATIENT MESSAGE (OUTPATIENT)
Dept: PHYSICAL MEDICINE AND REHAB | Facility: CLINIC | Age: 55
End: 2020-05-19

## 2020-05-22 ENCOUNTER — PATIENT MESSAGE (OUTPATIENT)
Dept: ELECTROPHYSIOLOGY | Facility: CLINIC | Age: 55
End: 2020-05-22

## 2020-05-25 ENCOUNTER — OFFICE VISIT (OUTPATIENT)
Dept: INTERNAL MEDICINE | Facility: CLINIC | Age: 55
End: 2020-05-25
Payer: MEDICARE

## 2020-05-25 ENCOUNTER — PATIENT MESSAGE (OUTPATIENT)
Dept: ELECTROPHYSIOLOGY | Facility: CLINIC | Age: 55
End: 2020-05-25

## 2020-05-25 VITALS
DIASTOLIC BLOOD PRESSURE: 78 MMHG | SYSTOLIC BLOOD PRESSURE: 136 MMHG | WEIGHT: 293 LBS | HEART RATE: 88 BPM | HEIGHT: 64 IN | OXYGEN SATURATION: 97 % | BODY MASS INDEX: 50.02 KG/M2

## 2020-05-25 DIAGNOSIS — Z95.810 BIVENTRICULAR AUTOMATIC IMPLANTABLE CARDIOVERTER DEFIBRILLATOR IN SITU: ICD-10-CM

## 2020-05-25 DIAGNOSIS — E66.01 OBESITY, CLASS III, BMI 40-49.9 (MORBID OBESITY): ICD-10-CM

## 2020-05-25 DIAGNOSIS — F33.1 DEPRESSION, MAJOR, RECURRENT, MODERATE: ICD-10-CM

## 2020-05-25 DIAGNOSIS — I48.0 PAROXYSMAL ATRIAL FIBRILLATION: ICD-10-CM

## 2020-05-25 DIAGNOSIS — R53.82 CHRONIC FATIGUE: ICD-10-CM

## 2020-05-25 DIAGNOSIS — Z86.74 HISTORY OF SUDDEN CARDIAC ARREST: ICD-10-CM

## 2020-05-25 DIAGNOSIS — F41.9 ANXIETY: ICD-10-CM

## 2020-05-25 DIAGNOSIS — G43.711 CHRONIC MIGRAINE WITHOUT AURA, WITH INTRACTABLE MIGRAINE, SO STATED, WITH STATUS MIGRAINOSUS: ICD-10-CM

## 2020-05-25 DIAGNOSIS — R73.03 PREDIABETES: ICD-10-CM

## 2020-05-25 DIAGNOSIS — E04.1 THYROID NODULE: ICD-10-CM

## 2020-05-25 DIAGNOSIS — Z20.822 CLOSE EXPOSURE TO COVID-19 VIRUS: ICD-10-CM

## 2020-05-25 DIAGNOSIS — I10 ESSENTIAL HYPERTENSION: ICD-10-CM

## 2020-05-25 DIAGNOSIS — Z86.73 HISTORY OF CVA (CEREBROVASCULAR ACCIDENT): ICD-10-CM

## 2020-05-25 DIAGNOSIS — G47.33 OSA (OBSTRUCTIVE SLEEP APNEA): ICD-10-CM

## 2020-05-25 DIAGNOSIS — I42.8 NONISCHEMIC CARDIOMYOPATHY: ICD-10-CM

## 2020-05-25 DIAGNOSIS — D51.9 ANEMIA DUE TO VITAMIN B12 DEFICIENCY, UNSPECIFIED B12 DEFICIENCY TYPE: Primary | ICD-10-CM

## 2020-05-25 DIAGNOSIS — Z12.31 SCREENING MAMMOGRAM, ENCOUNTER FOR: ICD-10-CM

## 2020-05-25 DIAGNOSIS — E78.2 MIXED HYPERLIPIDEMIA: ICD-10-CM

## 2020-05-25 DIAGNOSIS — E55.9 VITAMIN D DEFICIENCY: ICD-10-CM

## 2020-05-25 DIAGNOSIS — E53.8 B12 DEFICIENCY: ICD-10-CM

## 2020-05-25 PROBLEM — R29.6 FREQUENT FALLS: Status: RESOLVED | Noted: 2018-03-07 | Resolved: 2020-05-25

## 2020-05-25 PROCEDURE — 99214 OFFICE O/P EST MOD 30 MIN: CPT | Mod: S$PBB,,, | Performed by: INTERNAL MEDICINE

## 2020-05-25 PROCEDURE — 99214 OFFICE O/P EST MOD 30 MIN: CPT | Mod: PBBFAC | Performed by: INTERNAL MEDICINE

## 2020-05-25 PROCEDURE — 99999 PR PBB SHADOW E&M-EST. PATIENT-LVL IV: ICD-10-PCS | Mod: PBBFAC,,, | Performed by: INTERNAL MEDICINE

## 2020-05-25 PROCEDURE — 99214 PR OFFICE/OUTPT VISIT, EST, LEVL IV, 30-39 MIN: ICD-10-PCS | Mod: S$PBB,,, | Performed by: INTERNAL MEDICINE

## 2020-05-25 PROCEDURE — 99999 PR PBB SHADOW E&M-EST. PATIENT-LVL IV: CPT | Mod: PBBFAC,,, | Performed by: INTERNAL MEDICINE

## 2020-05-25 RX ORDER — LOSARTAN POTASSIUM 100 MG/1
100 TABLET ORAL DAILY
Qty: 90 TABLET | Refills: 3 | Status: SHIPPED | OUTPATIENT
Start: 2020-05-25 | End: 2021-04-13 | Stop reason: SDUPTHER

## 2020-05-25 NOTE — Clinical Note
Lisa Gar,Ms. Woo is on both plavix and Xarelto.  She has had a stroke in the past and was put on Plavix by Dr. Ruth but then is on Xarelto for hx DVT and A fib.  Do you think she still needs the plavix?  Patient states she has been unable to get in touch with Dr. Ruth and has not seen her in years.

## 2020-05-25 NOTE — PROGRESS NOTES
INTERNAL MEDICINE ESTABLISHED PATIENT VISIT NOTE    Subjective:     Chief Complaint: Follow-up  preDM, HTN     Patient ID: Amna Chawla is a 54 y.o. female with hx CVA and TIA c residual cognitive deficits (most recently c TIA 9/2018 per pt, has mild B weakness from several strokes in the past), carotid a disease, HTN, paroxysmal A fib c LAE, hx sudden cardiac arrest c VF and no ischemic heart disease and preserved EF (2013), intermittent 3rd deg AV block and symptomatic LVEF of 40% in setting of normal PET stress and chronic RV pacing s/p CRT-D, HLD, thyroid nodule s/p FNA 7/2018 c path c/w benign follicular nodule, depression with anxiety followed by psych, chronic complex h/a c occipital neuralgia followed by Neuro and on mult meds and has also had tx c botox, GERD c hiatal hernia, B carpal tunnel s/p release B, hx L middle ring finger trigger finger s/p release, SHIRA on APAP 6-20cm followed in sleep clinic, insomnia, prediabetes, last seen by me in Jan, here today for f/u.    Continuing to follow c Neuro for injections for chronic h/a issues.  Was also seen by Dr. eMjia and was rx'ed Lasix in March for LE edema.  States she is now taking it once a week and LE edema has improved.    Denies cp or sob.    Past Medical History:  Past Medical History:   Diagnosis Date    Anticoagulant long-term use     Anxiety     Asthma     Atrial fibrillation     Brain anoxic injury     Cervicalgia 8/28/2014    CHI (closed head injury) 2/19/2013    Convulsion 5/30/2015    Decreased ROM of left shoulder 4/12/2017    Defibrillator activation 2013    Depression     Heart block     History of sudden cardiac arrest 2/2013    PEA arrest with subsequent long-QT    Hx of psychiatric care     Hypertension     Left atrial enlargement 4/11/2018    Pacemaker 2013    Paresthesia 11/1/2013    Prolonged Q-T interval on ECG 2/8/2013    Psychiatric problem     Seizures     Sleep difficulties     Stroke     weakness lt side     Therapy     Thyroid disease     Upper airway resistance syndrome 2/21/2017       Home Medications:  Prior to Admission medications    Medication Sig Start Date End Date Taking? Authorizing Provider   blood sugar diagnostic Strp 1 strip by Misc.(Non-Drug; Combo Route) route 2 (two) times daily. 5/20/19  Yes Tiffany Mina MD   blood-glucose meter kit Please provide with insurance covered meter 5/20/19  Yes Tiffany Mina MD   carvedilol (COREG) 25 MG tablet TAKE 1 TABLET BY MOUTH TWICE DAILY, WITH MEALS 1/3/20  Yes Rin Martins MD   clonazePAM (KLONOPIN) 1 MG tablet Take 1 tablet (1 mg total) by mouth daily as needed for Anxiety. 4/30/20  Yes Kade Huffman III, NP   clopidogreL (PLAVIX) 75 mg tablet Take 1 tablet (75 mg total) by mouth once daily. 4/1/20 4/1/21 Yes Perlita Ruth MD   cyanocobalamin, vitamin B-12, 1,000 mcg Subl Place 2,000 mcg under the tongue once daily. 3/3/20  Yes Tiffany Mina MD   donepezil (ARICEPT) 10 MG tablet Take 1 tablet (10 mg total) by mouth 2 (two) times daily. 7/18/17  Yes Toby Paredes MD   DULoxetine (CYMBALTA) 60 MG capsule Take 1 capsule (60 mg total) by mouth 2 (two) times daily. 4/30/20  Yes Kade Huffman III, NP   ergocalciferol (ERGOCALCIFEROL) 50,000 unit Cap Take 1 capsule (50,000 Units total) by mouth twice a week. 3/5/20  Yes Tiffany Mina MD   flurbiprofen (ANSAID) 100 MG tablet Take 1 tablet (100 mg total) by mouth 2 (two) times daily as needed (migraine). 10/1/19  Yes David Cortez MD   fremanezumab-vfrm (AJOVY) 225 mg/1.5 mL injection Inject 225 mg into the skin every 28 days. 3/18/20  Yes David Cortez MD   furosemide (LASIX) 20 MG tablet Take 1 tablet (20 mg total) by mouth once daily. 3/31/20 3/31/21 Yes Rin Martins MD   gabapentin (NEURONTIN) 300 MG capsule Take 3 capsules (900 mg total) by mouth 3 (three) times daily. 11/30/16  Yes David Cortez MD   HYDROcodone-acetaminophen (NORCO) 5-325 mg per tablet Take 1 tablet by mouth every 6 (six)  hours as needed for Pain. 2/11/20  Yes Matt Pugh Jr., MD   lancets Misc 1 Device by Misc.(Non-Drug; Combo Route) route 2 (two) times daily. 5/20/19  Yes Tiffany Mina MD   losartan (COZAAR) 100 MG tablet Take 1 tablet (100 mg total) by mouth once daily. 7/23/19 7/22/20 Yes Tiffany Mina MD   mupirocin (BACTROBAN) 2 % ointment Apply to affected area 3 times daily 11/9/19  Yes Uche Phillips MD   ondansetron (ZOFRAN-ODT) 4 MG TbDL Take 1 tablet (4 mg total) by mouth every 12 (twelve) hours as needed (nausea). 3/16/20  Yes David Cortez MD   pantoprazole (PROTONIX) 40 MG tablet Take 1 tablet (40 mg total) by mouth once daily. 10/30/19  Yes Rin Martins MD   rosuvastatin (CRESTOR) 40 MG Tab Take 1 tablet (40 mg total) by mouth every evening. 2/28/20  Yes Alexander Milton MD   tiaGABine (GABITRIL) 4 MG tablet Take 1 tablet (4 mg total) by mouth every evening. 7/17/19  Yes David Cortez MD   TRUE METRIX GLUCOSE METER Misc TEST BG BID 5/30/19  Yes Obdulia Swift MD   ubrogepant (UBRELVY)tablet 100 mg Take 1 tablet (100 mg total) by mouth 2 (two) times daily as needed for Migraine. 5/15/20  Yes David Cortez MD   XARELTO 20 mg Tab TAKE 1 TABLET BY MOUTH DAILY WITH DINNER OR EVENING MEAL 1/23/20  Yes Avelino Mejia MD   zolpidem (AMBIEN) 10 mg Tab Take 1 tablet (10 mg total) by mouth nightly as needed. 4/30/20  Yes Kade Huffman III, NP   ARIPiprazole (ABILIFY) 15 MG Tab Take 1 tablet (15 mg total) by mouth once daily. 4/30/20   Kade Huffman III, NP   baclofen (LIORESAL) 20 MG tablet Take 1 tablet (20 mg total) by mouth 3 (three) times daily. 5/21/19 5/20/20  Tiffany Mina MD   magnesium 200 mg Tab Take 200 mg by mouth once daily.  Patient taking differently: Take 200 mg by mouth every other day.  3/7/18   David Cortez MD   metFORMIN (GLUCOPHAGE) 1000 MG tablet Take 1 tablet (1,000 mg total) by mouth 2 (two) times daily with meals. for 7 days 3/3/20 4/22/20  Tiffany Mina MD   silver sulfADIAZINE 1%  (SILVADENE) 1 % cream Apply topically 2 (two) times daily.  Patient not taking: Reported on 5/25/2020 5/22/19   Tiffany Mina MD   traZODone (DESYREL) 100 MG tablet Take 1 or 1.5 tablets by mouth before bed as needed for insomnia  Patient not taking: Reported on 5/25/2020 8/20/19   Kade Huffman III, NP       Allergies:  Review of patient's allergies indicates:   Allergen Reactions    Aspirin Hives    Imitrex [sumatriptan] Palpitations    Penicillins Hives and Swelling    Shellfish containing products Anaphylaxis     seafood    Percocet [oxycodone-acetaminophen] Itching     States can take Hydrocodone    Reglan [metoclopramide hcl] Other (See Comments)     Parkinsonism        Social History:  Social History     Tobacco Use    Smoking status: Never Smoker    Smokeless tobacco: Never Used   Substance Use Topics    Alcohol use: No     Frequency: 2-4 times a month     Drinks per session: 1 or 2     Binge frequency: Never    Drug use: No        Review of Systems   Constitutional: Positive for unexpected weight change. Negative for activity change.   HENT: Negative for hearing loss, rhinorrhea and trouble swallowing.    Eyes: Negative for discharge and visual disturbance.   Respiratory: Negative for chest tightness and wheezing.    Cardiovascular: Negative for chest pain and palpitations.   Gastrointestinal: Positive for constipation. Negative for blood in stool, diarrhea and vomiting.   Endocrine: Positive for polydipsia and polyuria.   Genitourinary: Negative for difficulty urinating, dysuria, hematuria and menstrual problem.   Musculoskeletal: Negative for arthralgias, joint swelling and neck pain.   Neurological: Positive for headaches. Negative for weakness.   Psychiatric/Behavioral: Positive for dysphoric mood. Negative for confusion.         Health Maintenance:     Immunizations:   Influenza 10/2019  TDap 10/2/2018  Pneumovax 9/2018 here per pt  Shingrix 10/2019, 3/2020 completed.     Cancer  "Screening:  PAP: 9/2019 c Dr. Cara momin  Mammogram:  10/2019 benign, will schedule for Oct.  Colonoscopy:  5/2019, polyps x2 removed, tubular adenoma dn tubulovillous adenoma on path c rec repeat in 3 yrs per report.    Objective:   /78 (BP Location: Right arm, Patient Position: Sitting, BP Method: Large (Manual))   Pulse 88   Ht 5' 4" (1.626 m)   Wt (!) 139.6 kg (307 lb 12.2 oz)   LMP 02/04/2016 (Approximate)   SpO2 97%   BMI 52.83 kg/m²        General: AAO x3, no apparent distress  HEENT: PERRL, OP clear  CV: RRR, no m/r/g  Pulm: Lungs CTAB, no crackles, no wheezes  Abd: s/NT/ND +BS  Extremities: no c/c/e    Labs:     Lab Results   Component Value Date    WBC 3.28 (L) 03/03/2020    HGB 11.5 (L) 03/03/2020    HCT 35.9 (L) 03/03/2020    MCV 93 03/03/2020     03/03/2020     Sodium   Date Value Ref Range Status   04/07/2020 136 136 - 145 mmol/L Final     Potassium   Date Value Ref Range Status   04/07/2020 3.8 3.5 - 5.1 mmol/L Final     Chloride   Date Value Ref Range Status   04/07/2020 104 95 - 110 mmol/L Final     CO2   Date Value Ref Range Status   04/07/2020 26 23 - 29 mmol/L Final     Glucose   Date Value Ref Range Status   04/07/2020 130 (H) 70 - 110 mg/dL Final     BUN, Bld   Date Value Ref Range Status   04/07/2020 17 6 - 20 mg/dL Final     Creatinine   Date Value Ref Range Status   04/07/2020 1.0 0.5 - 1.4 mg/dL Final     Calcium   Date Value Ref Range Status   04/07/2020 8.9 8.7 - 10.5 mg/dL Final     Total Protein   Date Value Ref Range Status   03/03/2020 9.9 (H) 6.0 - 8.4 g/dL Final     Albumin   Date Value Ref Range Status   03/03/2020 3.3 (L) 3.5 - 5.2 g/dL Final     Total Bilirubin   Date Value Ref Range Status   03/03/2020 0.2 0.1 - 1.0 mg/dL Final     Comment:     For infants and newborns, interpretation of results should be based  on gestational age, weight and in agreement with clinical  observations.  Premature Infant recommended reference ranges:  Up to 24 " hours.............<8.0 mg/dL  Up to 48 hours............<12.0 mg/dL  3-5 days..................<15.0 mg/dL  6-29 days.................<15.0 mg/dL       Alkaline Phosphatase   Date Value Ref Range Status   03/03/2020 62 55 - 135 U/L Final     AST   Date Value Ref Range Status   03/03/2020 16 10 - 40 U/L Final     ALT   Date Value Ref Range Status   03/03/2020 24 10 - 44 U/L Final     Anion Gap   Date Value Ref Range Status   04/07/2020 6 (L) 8 - 16 mmol/L Final     eGFR if    Date Value Ref Range Status   04/07/2020 >60.0 >60 mL/min/1.73 m^2 Final     eGFR if non    Date Value Ref Range Status   04/07/2020 >60.0 >60 mL/min/1.73 m^2 Final     Comment:     Calculation used to obtain the estimated glomerular filtration  rate (eGFR) is the CKD-EPI equation.        Lab Results   Component Value Date    HGBA1C 6.4 (H) 03/03/2020     Lab Results   Component Value Date    LDLCALC 65.4 05/17/2019     Lab Results   Component Value Date    TSH 3.196 03/03/2020         Assessment/Plan     Amna was seen today for follow-up.    Diagnoses and all orders for this visit:    Anemia due to vitamin B12 deficiency, unspecified B12 deficiency type  B12 deficiency  -     CBC auto differential; Future  -     Vitamin B12; Future  Lab Results   Component Value Date    WBC 3.28 (L) 03/03/2020    HGB 11.5 (L) 03/03/2020    HCT 35.9 (L) 03/03/2020    MCV 93 03/03/2020     03/03/2020       Stable on last check, will repeat now.  If b12 under 300 will rec monthly injections.  -     CBC auto differential; Future    Essential hypertension  at goal.  Cont current medications.   -     losartan (COZAAR) 100 MG tablet; Take 1 tablet (100 mg total) by mouth once daily.    Anxiety  Depression, major, recurrent, moderate  Seeing psych, cont mgmt.    Biventricular automatic implantable cardioverter defibrillator in situ  History of sudden cardiac arrest  Nonischemic cardiomyopathy from RV pacing, resolved with upgrade  cardiac resynchronization therapy  Paroxysmal atrial fibrillation  Followed by Dr. Martins and Dr. Mejia  Will message Dr. Martins to see if pt still needs to be on Plavix  Has pacer, ?issues c leads c recent unremarkable cxr but is waiting to hear back from Cards.  Has device check pending.    Chronic migraine without aura, with intractable migraine, so stated, with status migrainosus  Sees Neuro, getting injections, no acute issues.    History of CVA (cerebrovascular accident)  As above, cont bp and lipid control, will see if plavix can be stopped since on Xarelto    Mixed hyperlipidemia  Lab Results   Component Value Date    LDLCALC 65.4 05/17/2019     Controlled, no issues  -     Lipid Panel; Future    Obesity, Class III, BMI 40-49.9 (morbid obesity)  Counseled extensively on need for diet changes and daily exercise.  Discussed examples of healthy eating.  Recommend at least 30 minutes of exercise at least 5 days a week.      Prediabetes  Lab Results   Component Value Date    HGBA1C 6.4 (H) 03/03/2020     Now on higher dose of metformin, will repeat labs  -     Comprehensive metabolic panel; Future  -     Hemoglobin A1C; Future    Thyroid nodule  Hx FNA benign and due for f/u u/s in Oct, prev seen by Dr. Quijano, will order u/s  -     US Soft Tissue Head Neck Thyroid; Future    Vitamin D deficiency  suboptimal on last check and now on twice weekly ergo, will repeat labs.       Vitamin D; Future    SHIRA (obstructive sleep apnea)  Likely contributing to fatigue, awaiting additional testing via sleep clinic    Chronic fatigue  As above, likely due to b12 def and sleep apnea, mgmt as above    Close Exposure to Covid-19 Virus  -     COVID-19 (SARS CoV-2) IgG Antibody; Future    Screening mammogram, encounter for  -     Mammo Digital Screening Bilat w/ Surendra; Future    HM as above  RTC in 4 mos, sooner if needed.  Fasting labs next week.    Tiffany Mina MD  Department of Internal Medicine - [facility]  05/25/2020

## 2020-05-26 ENCOUNTER — PATIENT OUTREACH (OUTPATIENT)
Dept: OTHER | Facility: OTHER | Age: 55
End: 2020-05-26

## 2020-05-26 ENCOUNTER — DOCUMENTATION ONLY (OUTPATIENT)
Dept: ELECTROPHYSIOLOGY | Facility: CLINIC | Age: 55
End: 2020-05-26

## 2020-05-26 ENCOUNTER — TELEPHONE (OUTPATIENT)
Dept: SLEEP MEDICINE | Facility: OTHER | Age: 55
End: 2020-05-26

## 2020-05-26 ENCOUNTER — TELEPHONE (OUTPATIENT)
Dept: CARDIOLOGY | Facility: HOSPITAL | Age: 55
End: 2020-05-26

## 2020-05-26 NOTE — LETTER
May 27, 2020     Amna Chawla  [address]  [address]  [place] [address]       Dear Amna,    Welcome to [facility]! Our goal is to make care effective, proactive and convenient by using data you send us from home to better treat your chronic conditions.              My name is Jacklyn Garcia, and I am your dedicated Digital Medicine clinician. As an expert in medication management, I will help ensure that the medications you are taking continue to provide the intended benefits and help you reach your goals. You can reach me directly at [phone] or by sending me a message directly through your [facility] account.      I am Alexandra Duckworth and I will be your health . My job is to help you identify lifestyle changes to improve your disease control. We will talk about nutrition, exercise, and other ways you may be able to adjust your current habits to better your health. Additionally, we will help ensure you are completing the tests and screenings that are necessary to help manage your conditions. You can reach me directly at [phone] or by sending me a message directly through your [facility] account.    Most importantly, YOU are at the center of this team. Together, we will work to improve your overall health and encourage you to meet your goals for a healthier lifestyle.     What we expect from YOU:  · Please take frequent home blood pressure measurements. We ask that you take at least 1 blood pressure reading per week, but more information will better help us get you know you. Be sure you rest for a few minutes before taking the reading in a quiet, comfortable place.     Be available to receive phone calls or [facility] [employer] messages, when appropriate, from your care team. Please let us know if there are any specific days or times that work best for us to reach you via phone.     Complete routine tests and screenings. Dont worry, we will help keep you on track!           What you  should expect from your Digital Medicine Care Team:   We will work with you to create a personalized plan of care and provide you with encouragement and education, including regarding lifestyle changes, that could help you manage your disease states.     We will adjust your current medications, if needed, and continue to monitor your long-term progress.     We will provide you and your physician with monthly progress reports after you have been in the program for more than 30 days.     We will send you reminders through [employer] and text messages to help ensure you do not miss any testing deadlines to help manage your disease states.    You will be able to reach us by phone or through your [employer] account by clicking our names under Care Team on the right side of the home screen.    I look forward to working with you to achieve your blood pressure goals!    We look forward to working with you to help manage your health,    Sincerely,    Your Digital Medicine Team    Please visit our websites to learn more:   · Hypertension: [email or web address]      Remember, we are not available for emergencies. If you have an emergency, please contact your doctors office directly or call [facility] on[facility] ([phone] or [phone]) or [phone].

## 2020-05-26 NOTE — TELEPHONE ENCOUNTER
Ms. Chawla contacted the clinic on today in relation to her device.Pt called stating that last night she felt real fast heart palpitations and wanted to know if anything was noted remotely from her [employer] home monitor. Message sent to Davon to assist.

## 2020-05-26 NOTE — PROGRESS NOTES
Digital Medicine: Health  Introduction    Introduced Amna Chawla to Digital Medicine. Discussed health  role and recommended lifestyle modifications.    She had some problems with the cuff but was able to visit the [facility] yesterday to get help and correction on technique. Readings are coming in now.    Her daughter helps her take readings. She will take another reading tonight.    Provided clinician Imi's contact information. Pt understands roles of care team and will call if any questions arise.      The history is provided by the patient.     HYPERTENSION  Our goal is to get BP to consistently below 130/80mmHg and make the process convenient so patient can avoid extra trips to the office. Getting your blood pressure below 130/80mmHg (definition of control) will reduce your risk for heart attack, kidney failure, stroke and death (as well as kidney failure, eye disease, & dementia)      Reviewed that the Digital Medicine care team - consisting of a clinician and a health  - will follow the most current evidence-based national guidelines for treating your condition.  The health  will focus on lifestyle modifications and motivation while the clinician will focus on medication therapy.  The care team will review all data on a regular basis and reach out as needed.      Explained that one of the key parts of the program is communication with the care team.  Asked patient to respond to outreach attempts and complete questionnaires.  Stressed importance of medication adherence.    Reviewed non-pharmacologic therapies and impact on BP.      Explained that we expect patient to obtain several blood pressures per week at random times of day.  Instructed patient not to allow anyone else to use phone and monitoring device.  Confirmed appropriate BP monitoring technique.      Explained to patient that the digital medicine team is not available for emergencies.  Patient will call [facility] on-call  ([phone] or [phone]) or [phone] if needed.      Patient's BP goal is 130/80.Patient's BP average is 138/89 mmHg, which is above goal, per 2017 ACC/AHA Hypertension Guidelines.          Last 5 Patient Entered Readings                                      Current 30 Day Average: 138/89     Recent Readings 5/25/2020 5/25/2020 5/1/2020 5/1/2020    SBP (mmHg) 112 112 164 164    DBP (mmHg) 74 74 103 103    Pulse 89 89 89 89            INTERVENTION(S)  reviewed monitoring technique and encouragement/support    PLAN  patient verbalizes understanding, patient amenable to changes and additional monitoring needed          Topic    Lipid (Cholesterol) Test        Reviewed the importance of self-monitoring, medication adherence, and that the health  can be used as a resource for lifestyle modifications to help reduce or maintain a healthy lifestyle.    Sent link to [facility] [employer] webpages and my contact information via [email or web address] for future questions. Follow up scheduled.         Screenings    SDOH

## 2020-05-26 NOTE — PROGRESS NOTES
Patient called into clinic with complaint of feeling palpitations from 8pm-midnight last night and was inquiring if any arrhythmia was recorded.  Advised patient that no arrhythmia was noted on remote monitoring, but PVC's were noted yesterday.  Advised patient will cont to monitor for persistence in PVCs.

## 2020-05-31 ENCOUNTER — CLINICAL SUPPORT (OUTPATIENT)
Dept: CARDIOLOGY | Facility: HOSPITAL | Age: 55
End: 2020-05-31
Attending: INTERNAL MEDICINE
Payer: MEDICARE

## 2020-05-31 ENCOUNTER — EXTERNAL CHRONIC CARE MANAGEMENT (OUTPATIENT)
Dept: PRIMARY CARE CLINIC | Facility: CLINIC | Age: 55
End: 2020-05-31
Payer: MEDICARE

## 2020-05-31 DIAGNOSIS — Z95.810 AICD (AUTOMATIC CARDIOVERTER/DEFIBRILLATOR) PRESENT: ICD-10-CM

## 2020-05-31 PROCEDURE — 93295 DEV INTERROG REMOTE 1/2/MLT: CPT | Mod: ,,, | Performed by: INTERNAL MEDICINE

## 2020-05-31 PROCEDURE — 93295 CARDIAC DEVICE CHECK - REMOTE: ICD-10-PCS | Mod: ,,, | Performed by: INTERNAL MEDICINE

## 2020-05-31 PROCEDURE — 99490 CHRNC CARE MGMT STAFF 1ST 20: CPT | Mod: S$PBB,,, | Performed by: INTERNAL MEDICINE

## 2020-05-31 PROCEDURE — 99490 PR CHRONIC CARE MGMT, 1ST 20 MIN: ICD-10-PCS | Mod: S$PBB,,, | Performed by: INTERNAL MEDICINE

## 2020-05-31 PROCEDURE — 99490 CHRNC CARE MGMT STAFF 1ST 20: CPT | Mod: PBBFAC | Performed by: INTERNAL MEDICINE

## 2020-06-02 ENCOUNTER — TELEPHONE (OUTPATIENT)
Dept: INTERNAL MEDICINE | Facility: CLINIC | Age: 55
End: 2020-06-02

## 2020-06-02 ENCOUNTER — TELEPHONE (OUTPATIENT)
Dept: NEUROLOGY | Facility: CLINIC | Age: 55
End: 2020-06-02

## 2020-06-02 NOTE — TELEPHONE ENCOUNTER
----- Message from Rin Martins MD sent at 6/2/2020 11:23 AM CDT -----  Jesus Allen,    I think we can stop the plavix and just continue with the Xarelto.    Thanks,  Rin  ----- Message -----  From: Tiffany Mina MD  Sent: 5/25/2020   9:17 AM CDT  To: MD Lisa Morales,  MsAmandeep Woo is on both plavix and Xarelto.  She has had a stroke in the past and was put on Plavix by Dr. Ruth but then is on Xarelto for hx DVT and A fib.  Do you think she still needs the plavix?  Patient states she has been unable to get in touch with Dr. Ruth and has not seen her in years.

## 2020-06-02 NOTE — TELEPHONE ENCOUNTER
Left VM for pt stating it is fine for her to stop the Plavix but continue to Xarelto based on response from Dr. Martins.  Pt to call me back if questions.

## 2020-06-03 ENCOUNTER — TELEPHONE (OUTPATIENT)
Dept: NEUROLOGY | Facility: CLINIC | Age: 55
End: 2020-06-03

## 2020-06-08 ENCOUNTER — TELEPHONE (OUTPATIENT)
Dept: PHARMACY | Facility: CLINIC | Age: 55
End: 2020-06-08

## 2020-06-10 ENCOUNTER — TELEPHONE (OUTPATIENT)
Dept: ELECTROPHYSIOLOGY | Facility: CLINIC | Age: 55
End: 2020-06-10

## 2020-06-10 ENCOUNTER — PATIENT OUTREACH (OUTPATIENT)
Dept: OTHER | Facility: OTHER | Age: 55
End: 2020-06-10

## 2020-06-10 DIAGNOSIS — G43.711 CHRONIC MIGRAINE WITHOUT AURA, WITH INTRACTABLE MIGRAINE, SO STATED, WITH STATUS MIGRAINOSUS: Primary | ICD-10-CM

## 2020-06-10 DIAGNOSIS — T82.198A MALFUNCTION OF ELECTRODE LEAD OF IMPLANTABLE CARDIOVERTER-DEFIBRILLATOR (ICD): Primary | ICD-10-CM

## 2020-06-10 DIAGNOSIS — Z91.013 ALLERGY TO SHELLFISH: Primary | ICD-10-CM

## 2020-06-10 DIAGNOSIS — T82.110A PACEMAKER LEAD MALFUNCTION, INITIAL ENCOUNTER: ICD-10-CM

## 2020-06-10 NOTE — TELEPHONE ENCOUNTER
Spoke to Ms. Chawla.  Venogram scheduled for 7/22/2020 with 2:00 pm arrival time.  We reviewed the following together via the phone:    -Elective procedure COVID 19 Testing scheduled for 7/20/2020 11 am 2nd Floor clinic site  -Advised of current visitor policy which allows for each patient to have one adult visitor pre and post procedure.  -Pre-procedure labs will be drawn 7/15/2020 11 am.   -High importance of HOLDING Metformin morning of procedure.   -Contrast Prep Instructions of taking Benadryl 50 mg, Tagamet 300 mg, and Prednisone 50 mg 13 hrs, 7 hrs, and 1 hr prior to procedure.  Will confirm definite times day prior to procedure when definite arrival time will be known.    -Written instructions will be sent upon completion.     Ms. Chawla verbalizes understanding of above and will call our office with any questions or concerns.

## 2020-06-10 NOTE — TELEPHONE ENCOUNTER
----- Message from Julissa Altman sent at 6/10/2020  3:08 PM CDT -----  Contact: Patient  Patient is waiting for a call to schedule her procedure.    ----- Message -----  From: Traci Martinez  Sent: 6/10/2020   2:42 PM CDT  To: Roberto [facility]RE[facility] Staff    The Pt is calling because someone was suppose to schedule a test for her. Please call her back @ [phone]. Thanks, Traci

## 2020-06-11 RX ORDER — CIMETIDINE 300 MG/1
TABLET, FILM COATED ORAL
Qty: 3 TABLET | Refills: 0 | Status: SHIPPED | OUTPATIENT
Start: 2020-06-11 | End: 2020-08-03 | Stop reason: ALTCHOICE

## 2020-06-11 RX ORDER — PREDNISONE 50 MG/1
TABLET ORAL
Qty: 3 TABLET | Refills: 0 | Status: SHIPPED | OUTPATIENT
Start: 2020-06-11 | End: 2020-08-03 | Stop reason: ALTCHOICE

## 2020-06-11 RX ORDER — DIPHENHYDRAMINE HCL 25 MG
50 CAPSULE ORAL
Qty: 6 CAPSULE | Refills: 0 | COMMUNITY
Start: 2020-06-11 | End: 2020-08-03 | Stop reason: ALTCHOICE

## 2020-06-12 ENCOUNTER — LAB VISIT (OUTPATIENT)
Dept: LAB | Facility: HOSPITAL | Age: 55
End: 2020-06-12
Attending: INTERNAL MEDICINE
Payer: MEDICARE

## 2020-06-12 DIAGNOSIS — E53.8 B12 DEFICIENCY: ICD-10-CM

## 2020-06-12 DIAGNOSIS — E55.9 VITAMIN D DEFICIENCY: ICD-10-CM

## 2020-06-12 DIAGNOSIS — Z20.822 CLOSE EXPOSURE TO COVID-19 VIRUS: ICD-10-CM

## 2020-06-12 DIAGNOSIS — R73.03 PREDIABETES: ICD-10-CM

## 2020-06-12 DIAGNOSIS — E78.2 MIXED HYPERLIPIDEMIA: ICD-10-CM

## 2020-06-12 DIAGNOSIS — D51.9 ANEMIA DUE TO VITAMIN B12 DEFICIENCY, UNSPECIFIED B12 DEFICIENCY TYPE: ICD-10-CM

## 2020-06-12 LAB
25(OH)D3+25(OH)D2 SERPL-MCNC: 29 NG/ML (ref 30–96)
ALBUMIN SERPL BCP-MCNC: 3.1 G/DL (ref 3.5–5.2)
ALP SERPL-CCNC: 60 U/L (ref 55–135)
ALT SERPL W/O P-5'-P-CCNC: 23 U/L (ref 10–44)
ANION GAP SERPL CALC-SCNC: 4 MMOL/L (ref 8–16)
AST SERPL-CCNC: 16 U/L (ref 10–40)
BASOPHILS # BLD AUTO: 0 K/UL (ref 0–0.2)
BASOPHILS NFR BLD: 0 % (ref 0–1.9)
BILIRUB SERPL-MCNC: 0.3 MG/DL (ref 0.1–1)
BUN SERPL-MCNC: 11 MG/DL (ref 6–20)
CALCIUM SERPL-MCNC: 9.1 MG/DL (ref 8.7–10.5)
CHLORIDE SERPL-SCNC: 105 MMOL/L (ref 95–110)
CHOLEST SERPL-MCNC: 95 MG/DL (ref 120–199)
CHOLEST/HDLC SERPL: 2.7 {RATIO} (ref 2–5)
CO2 SERPL-SCNC: 25 MMOL/L (ref 23–29)
CREAT SERPL-MCNC: 0.9 MG/DL (ref 0.5–1.4)
DIFFERENTIAL METHOD: ABNORMAL
EOSINOPHIL # BLD AUTO: 0 K/UL (ref 0–0.5)
EOSINOPHIL NFR BLD: 0.3 % (ref 0–8)
ERYTHROCYTE [DISTWIDTH] IN BLOOD BY AUTOMATED COUNT: 16.6 % (ref 11.5–14.5)
EST. GFR  (AFRICAN AMERICAN): >60 ML/MIN/1.73 M^2
EST. GFR  (NON AFRICAN AMERICAN): >60 ML/MIN/1.73 M^2
ESTIMATED AVG GLUCOSE: 131 MG/DL (ref 68–131)
GLUCOSE SERPL-MCNC: 107 MG/DL (ref 70–110)
HBA1C MFR BLD HPLC: 6.2 % (ref 4–5.6)
HCT VFR BLD AUTO: 33 % (ref 37–48.5)
HDLC SERPL-MCNC: 35 MG/DL (ref 40–75)
HDLC SERPL: 36.8 % (ref 20–50)
HGB BLD-MCNC: 10.7 G/DL (ref 12–16)
IMM GRANULOCYTES # BLD AUTO: 0.01 K/UL (ref 0–0.04)
IMM GRANULOCYTES NFR BLD AUTO: 0.3 % (ref 0–0.5)
LDLC SERPL CALC-MCNC: 47.8 MG/DL (ref 63–159)
LYMPHOCYTES # BLD AUTO: 1.1 K/UL (ref 1–4.8)
LYMPHOCYTES NFR BLD: 30 % (ref 18–48)
MCH RBC QN AUTO: 29.7 PG (ref 27–31)
MCHC RBC AUTO-ENTMCNC: 32.4 G/DL (ref 32–36)
MCV RBC AUTO: 92 FL (ref 82–98)
MONOCYTES # BLD AUTO: 0.4 K/UL (ref 0.3–1)
MONOCYTES NFR BLD: 12 % (ref 4–15)
NEUTROPHILS # BLD AUTO: 2 K/UL (ref 1.8–7.7)
NEUTROPHILS NFR BLD: 57.4 % (ref 38–73)
NONHDLC SERPL-MCNC: 60 MG/DL
NRBC BLD-RTO: 0 /100 WBC
PLATELET # BLD AUTO: 223 K/UL (ref 150–350)
PMV BLD AUTO: 10.7 FL (ref 9.2–12.9)
POTASSIUM SERPL-SCNC: 3.7 MMOL/L (ref 3.5–5.1)
PROT SERPL-MCNC: 9.8 G/DL (ref 6–8.4)
RBC # BLD AUTO: 3.6 M/UL (ref 4–5.4)
SARS-COV-2 IGG SERPLBLD QL IA.RAPID: NEGATIVE
SODIUM SERPL-SCNC: 134 MMOL/L (ref 136–145)
TRIGL SERPL-MCNC: 61 MG/DL (ref 30–150)
VIT B12 SERPL-MCNC: 1883 PG/ML (ref 210–950)
WBC # BLD AUTO: 3.5 K/UL (ref 3.9–12.7)

## 2020-06-12 PROCEDURE — 82607 VITAMIN B-12: CPT

## 2020-06-12 PROCEDURE — 80053 COMPREHEN METABOLIC PANEL: CPT

## 2020-06-12 PROCEDURE — 86769 SARS-COV-2 COVID-19 ANTIBODY: CPT

## 2020-06-12 PROCEDURE — 83036 HEMOGLOBIN GLYCOSYLATED A1C: CPT

## 2020-06-12 PROCEDURE — 85025 COMPLETE CBC W/AUTO DIFF WBC: CPT

## 2020-06-12 PROCEDURE — 80061 LIPID PANEL: CPT

## 2020-06-12 PROCEDURE — 36415 COLL VENOUS BLD VENIPUNCTURE: CPT

## 2020-06-12 PROCEDURE — 82306 VITAMIN D 25 HYDROXY: CPT

## 2020-06-15 ENCOUNTER — TELEPHONE (OUTPATIENT)
Dept: INTERNAL MEDICINE | Facility: CLINIC | Age: 55
End: 2020-06-15

## 2020-06-15 NOTE — TELEPHONE ENCOUNTER
----- Message from Maribel Austin sent at 6/15/2020  8:27 AM CDT -----  Regarding: Results Message  Contact: self  Calling to get test results.  Name of test (lab, xray, etc.):   Labs  Date of test:  6/12  Ordering provider: Dr Mina  Where was the test performed:  [facility]  Would the patient rather a call back or a response via [facility]?: Phone     Comments:

## 2020-06-26 ENCOUNTER — OFFICE VISIT (OUTPATIENT)
Dept: PHYSICAL MEDICINE AND REHAB | Facility: CLINIC | Age: 55
End: 2020-06-26
Payer: MEDICARE

## 2020-06-26 VITALS
HEIGHT: 64 IN | DIASTOLIC BLOOD PRESSURE: 73 MMHG | HEART RATE: 64 BPM | BODY MASS INDEX: 52.83 KG/M2 | SYSTOLIC BLOOD PRESSURE: 123 MMHG

## 2020-06-26 DIAGNOSIS — G43.711 CHRONIC MIGRAINE WITHOUT AURA, WITH INTRACTABLE MIGRAINE, SO STATED, WITH STATUS MIGRAINOSUS: Primary | ICD-10-CM

## 2020-06-26 DIAGNOSIS — R29.898 DECREASED ROM OF NECK: Chronic | ICD-10-CM

## 2020-06-26 PROCEDURE — 64615 PR CHEMODENERVATION OF MUSCLE FOR CHRONIC MIGRAINE: ICD-10-PCS | Mod: S$PBB,,, | Performed by: PSYCHIATRY & NEUROLOGY

## 2020-06-26 PROCEDURE — 99214 OFFICE O/P EST MOD 30 MIN: CPT | Mod: PBBFAC,25 | Performed by: PSYCHIATRY & NEUROLOGY

## 2020-06-26 PROCEDURE — 64615 CHEMODENERV MUSC MIGRAINE: CPT | Mod: S$PBB,,, | Performed by: PSYCHIATRY & NEUROLOGY

## 2020-06-26 PROCEDURE — 99999 PR PBB SHADOW E&M-EST. PATIENT-LVL IV: ICD-10-PCS | Mod: PBBFAC,,, | Performed by: PSYCHIATRY & NEUROLOGY

## 2020-06-26 PROCEDURE — 99213 OFFICE O/P EST LOW 20 MIN: CPT | Mod: 25,S$PBB,, | Performed by: PSYCHIATRY & NEUROLOGY

## 2020-06-26 PROCEDURE — 64615 CHEMODENERV MUSC MIGRAINE: CPT | Mod: PBBFAC | Performed by: PSYCHIATRY & NEUROLOGY

## 2020-06-26 PROCEDURE — 99213 PR OFFICE/OUTPT VISIT, EST, LEVL III, 20-29 MIN: ICD-10-PCS | Mod: 25,S$PBB,, | Performed by: PSYCHIATRY & NEUROLOGY

## 2020-06-26 PROCEDURE — 99999 PR PBB SHADOW E&M-EST. PATIENT-LVL IV: CPT | Mod: PBBFAC,,, | Performed by: PSYCHIATRY & NEUROLOGY

## 2020-06-26 RX ORDER — FREMANEZUMAB-VFRM 225 MG/1.5ML
225 INJECTION SUBCUTANEOUS
Qty: 1 ML | Refills: 12 | Status: SHIPPED | OUTPATIENT
Start: 2020-06-26 | End: 2020-09-21

## 2020-06-26 RX ADMIN — ONABOTULINUMTOXINA 200 UNITS: 100 INJECTION, POWDER, LYOPHILIZED, FOR SOLUTION INTRADERMAL; INTRAMUSCULAR at 03:06

## 2020-06-26 NOTE — PROGRESS NOTES
Follow up:   HA stable   Try Ubrelvy again     Prior note:   independent left and right parietal ice prick HA      Prior note:   HA much improved   Only 2 since last visit      Prior note:   HA are more frequent   Taking 1600 mg motrin + zanaflex   Went to ER recently      Prior note:   HA overall stable in Hz and intensity   Reports a wearing off effect of BOTOX since last week   Taking zanaflex 8 mg TID        Prior note:   HA started 12/24   She feels BOTOX wore off last week   Reports GI distress from taking to many motrin      Prior note:   4/week HA - L vertex   Jabs - vertex either sides      She reports migraine that woke her up in the morning - associated with L side facial droop      Prior note:   She gets acceptable relief for 2.5 months after BOTOX      Prior note:   No recurrent stroke symptoms   starting having whole body tremors since this AM. Took 1 tablet of lorazepam   Last night had a HA, took trazodone       Admit Date: 4/11/2018  2:03 PM   Discharge Date and Time: 4/12/2018  8:30 PM   History of Present Illness: Ms. Chawla is a 51 yo F with afib on Eliquis (last dose this am), prior cardiac arrest with associated acute ischemic stroke with residual mild L sided weakness, CAD with ICD in situ, HTN, and HLD who presented with acute R sided weakness and sensation changes.  She originally called EMS for palpitations, but developed acute right sided facial droop and RUE weakness at 1:40pm today, after EMS had arrived.  She denies changes to speech or vision.  SBP en route 200/100.  She is ineligible for tPA 2/2 Eliquis use.  She is not suspected to have an LVO.  She will be admitted to  for observation overnight.     Hospital Course (synopsis of major diagnoses, care, treatment, and services provided during the course of the hospital stay): Ms. Chawla was admitted for acute stroke workup. Pt with pacemaker so unable to obtain MRI Brain; CTA H/N demonstrated no evidence acute infarction, though  did exhibit noncalcified plaquing of carotid bifurcations and proximal ICAs with < 50% stenosis, so Plavix was added to pt's daily home regimen. Concerned for TIA at this time though Migraine very high on differential due to pt's hx headaches, including presently this admission. Hypertensive urgency/emergency also considered due to pt's very elevated levels with EMS. Per chart review, Eliquis was a new medication since 4/3/18 (had switched from Coumadin), however staff Faraz concerned that pt had a potential event while on Eliquis. She wished to switch to Pradaxa as an alternative DOAC (as it has an option for reversal), however changed to Xarelto per pt's insurance coverage.   While admitted, pt given cocktail for HA which she has received previously at the infusion clinic - IV Magnesium, IM Toradol, 2mg IV Morphine, 500cc NS bolus (IV Benadryl not included this time as pt had received a dose earlier that day prior to contrast imaging.) HA improved somewhat and pt was encouraged to follow up with Dr. Cortez for continued management of botox injections, etc. At that time, pt also found with hyponatremia; d/c'd Clorthalidone and plan was made for pt to [facility] 4/16/18 for repeat CMP to evaluate Na level and BP check since discontinuing this medication.  Ms. Chawla was evaluated and treated by PT, OT, and SLP who recommend d/c with outpatient PT and OT. She was discharged home 4/12/18 with [facility] follow-up Monday      Prior note:   2 weeks ago BOTOX effect wore off   Used up all her percocet   3 wks h/o:   Reports frequent falls - falling forward, no LOC, no injuries, able to protect falls by hands   triggers - unknown   Also occ stumbling   Dragging L foot   No Pain in back or legs   No numbness or weakness in legs   No B/B incontinence   No lightheadedness      Prior note:    Flare up of TMJ and HA during daughter's wedding   NSAIDS helped   2 weeks ago BOTOX effect wore off       Prior note:   2 weeks ago BOTOX effect wore off   Has severe spasm and pain in the traps catalina   Weather change has provoked severe migraine + nausea   She started coumadin       Prior note:   3 weeks ago BOTOX effect wore off   She reports severe daily HA    During BOTOX periods she feels she  her HA. Much less intense      Prior note:   The patient is a 51-year-old female who [facility]. Since her last visit, she reports growing frustration   about the fact that nothing has helped her with regards to her headaches. She   continues to experience daily headaches. She takes mefenamic acid and   tizanidine every night, which only provides her two hours of relief. She is   unable to sleep because of the refractory headaches. She is incapacitated   because of severe headaches. She reports minimal relief with infusions that she  gets on a periodic basis. She does report an improvement overall with the   Botox. However, this effect has worn off in the last two weeks. She also   reports an episode wherein she fell with loss of consciousness, which was not   provoked. As a result, she injured her ankle and is currently wearing a boot.      Prior note:   since last week - Reports vertigo for 6 - 7 minutes upto 3 times daily   Nearly daily severe HA - no response to ponstel , compazine   She is late for her BOTOX - last 2 weeks were painful       Follow up:   R sided Headache is constant max at TMJ on R   Prior note:   She reports new episodes of R face tingling. Sh also reports 2 episodes of blurred vision lasting 15 minutes. These hapended while watching TV. Her pain remains unchanged   Follow up:   2 days of severe HA during Thanksgiving   Pounding bifrontal region   Pain worse over L eye brow   Follow up:   Reports bifrontal severe HA (worse on L) with no nausea with sudden onset . Occurs daily   NO note:  I found no papilledema or other changes to suggest  "elevated intracranial pressure or other alternative etiology for her headaches. Repeat exam in two years.  HA are less frequent and less intense.   (R levator scapula spasm)   symptoms better with lateral flexion to L   Prior note:   She still has daily HA with severe sharp stabbing pain behind L eye lasting for 15 sec   Prior note:   Daily pain. 2/week - nausea.  Shu Lares RN at 9/17/2014 4:53 PM  Pt presented to clinic for medical advice. Pt is seen by Dr. Paredes and Dr. Cortez.  1) She went ER on 9/13/14 for a migraine. She was given Reglan, Benadryl, MSO4 and IVF. A couple days later she developed an all over body "tremor". She states "I think I have Parkinson's". - Per Dr. Paredes, medication related tremor (Reglan & Benadryl). Informed her it should wear off in a few days. If not, she is to call and let us know.  2) Headaches - triggered by insomnia.   Current meds:  Ketoprofen - she took it once and said her "throat closed up" and she's not supposed to have anything with aspirin in it.  Nortriptyline - she was taking it at night, but it didn't help her sleep, so she stopped it.  Per Dr. Cortez, discontinue Ketoprofen and Nortriptyline. Start Effexor XR 37.5mg QD, tizanidine 4mg BID PRN headache and Klonopin 0.25 - 0.5mg QHS PRN. Will schedule pt for sphenocath procedure within the next week.   Prior note:   48 yo woman with history of HTN and asthma, both reportedly controlled, in hospital early 2013 after "passed out" and became unresponsive. CPR in the field for 8 minutes until EMS arrived. Per ED note, on arrival she was found to be in PEA, given epinephrine. Vfib/Vtach was achieved which was shocked x1. Patient converted to sinus tachycardia after shock. I do not know the time course for the above treatment. On arrival to facility patient was in sinus tachycardia with strong pulses, intubated and unresponsive. ET tube placement was confirmed with direct laryngoscopy and good bilateral breath sounds " "were heard after the tube was pulled back 2 cm. Reported to have "withdrawal" movements in ER during stabilization, then sedated and cooling protocol initiated. Had remarkable   recovery and first seen in clinic in 2013.   Headache history: cluster   Age of onset -    Location - behind L eye   Nature of pain - sharp   Prodrome - no   Aura - No   Duration of headache - 6-7 min   Time to peak intensity -   Pain scale - range of intensity - 9/10   Frequency - daily   Status Migrainosus history - 1-3 days   Time of day of most headaches- anytime   Associated symptoms with the headache:   Red eyes   swelling of L face   Headache history: Migraine   Age of onset -    Location - bifrontal   Nature of pain - Pounding   Prodrome - no   Aura - No   Duration of headache - > 4 hrs   Time to peak intensity -   Pain scale - range of intensity - 9/10   Frequency - 5/week   Status Migrainosus history - 1-3 days   Time of day of most headaches- anytime   Associated symptoms with the headache:   Meningeal symptoms - photophobia, phonophobia, exercise intolerance +   Nausea/vomitting +   Nasal drainage   Visual blurriness +   Pallor/flushing   Dizziness   Vertigo   Confusion   Impaired concentration +   Pain worsened with physical activity +   Neck pain +   Symptoms of increased intracranial pressure:   Whooshing sounds - no   Visual spots/blotches - no   Headache Triggers: unknown   Other comorbid conditions:   Anxiety - no   Motion sickness symptom - no       Treatment history:   Resolution of headache with sleep - yes       Meds tried:   Trigger points involvin/9/15 helped for 1 day   Site: Right   Levator scapula   Pharmacological agent:   0.5% Sensorcaine solution   No complications were noted.  Supraorbital block - helped for 2 days   Tylenol - not help   imitrex - chest tightness   alleve - help   topamax - not helping   Neurontin - not helping   Paxil, Elavil - not help   seroquel - nightmare   Ketoprofen - " "she took it once and said her "throat closed up" and she's not supposed to have anything with aspirin in it.  Nortriptyline - she was taking it at night, but it didn't help her sleep, so she stopped it.  reglan - parkinsonism   zanaflex - help   Toradol 30 mg IM inj at home - too expensive   BOTOX round #1 9/3/15 - helped (R levator scapula spasm)   Round #2 12/3/15 - helped   Round#3 3/316 - helped   Round #4 6/1/16 - helped   BOTOX#5 9/15/16 - helped     BOTOX#6 - 11/30/16 - helped   BOTOX#7 - 3/9/17 - helped    BOTOX#8 - 5/25/17 - helped    BOTOX#9 8/10/17 - helped   BOTOX#10 10/19/17 - helped   BOTOX#11 12/27/17 - helped   BOTOX#12 3/7/18 - helped   BOTOX#13 5/16/18 - helped    BOTOX#14 8/1/18 - helped     BOTOX#15 10/19/18 - helped      BOTOX#16 12/28/18 - helped   BOTOX#17 3/13/19 - helped    BOTOX#18 5/23/19 - helped     BOTOX#19 8/1/19 - helped      BOTOX#20 10/11/19 - helped     BOTOX#21 12/19/19 - helped   BOTOX#22 3/10/20 - helped     AIMOVIG (sept 1rst week, Oct first week, Nov first wk 140 mg, Dec first wk 140 mg, Jan, Feb) no benefit   Constipation +      Can not do RFA - pt has pacemaker   Procedure: IOVERA peripheral nerve focus cold therapy (non ablative cryotherapy) 12/30/15 - not help   Indication: Cryotherapy of select peripheral nerves of the head was performed to treat chronic headaches that failed to respond to several preventive pharmacological therapies.   Sedation: None   Informed consent: The procedure, risks, benefits and options were discussed with the patient. There are no contraindications to the procedure. The patient expressed understanding and agreed to the procedure. I verify that I personally obtained the patient's consent prior to the start of the procedure.  Location:  Bilateral Supra orbital nerve (3 cycles on R and 1 cycle on L)   Bilateral Occipital nerve   3 cycles   Pain relief: Pain score reduced 10/10->0/10   9/26 Bilateral Sphenopalatine Ganglion and bilateral Maxillary " Branch (Trigeminal Nerve) Block - no help   ONB - not help                                                                                                                                                                                                                                                               lilly Pagan RN at 12/31/2014 3:54 PM                          Status: Signed                                      Expand All Collapse All   HEADACHE FASTTRACK  PAIN 7/10 NAUSEA 0/10  ROUND 1: TORADOL, IMITREX, MORPHINE, BENADRYL, AND MAGNESIUM  PAIN 7/10 NAUSEA 0/10  PT STATED SHE WAS READY TO GO HOME AND GO TO SLEEP. PT D/C'ED IN NAD                             Shannon Pagan RN at 10/5/2015 12:49 PM                          Status: Signed                                      Expand All Collapse All   HEADACHE FASTTRACK  PAIN 8/10 NAUSEA 10/10  FIRST ROUND: tORADOL, BENADRYL, COMPAZINE, IMITREX, MAGNESIUM, AND VALPROATE.  PAIN 1/10 NAUSEA 0/10  PT READY TO GO HOME AND FEELING BETTER. PT LEFT IN NAD WITH FAMILY MEMBER  TOTAL TIME: 8842-8587                        Shannon Pagan RN at 10/20/2015 3:05 PM                          Status: Signed                                      Expand All Collapse All   HEADACHE FASTTRACK  PAIN 10/10 NAUSEA 0/10  FIRST ROUND: tORADOL, BENADRYL, COMPAZINE, IMITREX, MAGNESIUM, AND VALPROATE.  BP BEING MONITORED EVERY 15 MIN AND REMAINED 170'S/80-90'S. DR CHOPRA NOTIFIED AND STATED TO MAKE SURE BP DID NOT EXCEED 180 SYSTOLIC OR PT SHOULD REPORT TO ED. BP AT 1500 183/92. DR CHOPRA NOTIFIED AND GAVE ORDER TO STOP INFUSION AND SEND PATIENT TO ED. PT BROUGHT TO ED BY INFUSION NURSE VIA WHEELCHAIR.   PAIN 8/10 NAUSEA 0/10  TOTAL TIME: 0052-0101                                                   Progress Notes                                              Shannon Pagan RN at 2/24/2016 1:36 PM       Status: Signed                  Expand All Collapse All   HEADACHE FASTTRACK  PAIN 10/10  NAUSEA 7/10  FIRST ROUND: tORADOL, BENADRYL, AND MORPHINE-BP RANGING FROM 177-203/84-92, HR 60-82  MD NOTIFIED AND GAVE ORDERS TO SEND TO ED. PT LEFT VIA W/C WITH INFUSION NURSE ON WAY TO ED.            Headache risk factors:   H/o TBI - [facility] (2013) + neck sprain   H/o Meningitis - no   F/h Aneurysms - no       Review of Systems   Constitutional: Negative.   HENT: Negative.   Eyes: Negative.   Respiratory: Negative.   Cardiovascular: Negative.   Gastrointestinal: Negative.   Endocrine: Negative.   Genitourinary: Negative.   Musculoskeletal: Negative.   Skin: Negative.   Allergic/Immunologic: Negative.   Hematological: Negative.   Psychiatric/Behavioral: insomnia      Objective:     Physical Exam   Constitutional: She is oriented to person, place, and time. She appears well-developed.   obesity   Psychiatric: She has a normal mood and flat affect. Her behavior is normal. Judgment and thought content normal.   Headache Exam:  Optic disc is flat OU   Temples appear symmetric  No V1,V2,V3 distribution allodynia/hyperalgesia catalina   No facial swelling  No gross TMJ abnormalities   No ptosis   No conj injection   No meningismus   No neck stiffness  No C spine tenderness  Cervical facet tenderness on palpation catalina   No tender points over trapezium   catalina supraorbital nerve exit point tenderness  catalina occipital nerve exit point tenderness  No auriculotemporal nerve tenderness   Tenderness of levator scapula catalina      Gait - wide based gait   Tremor in hands, legs, voice and neck                                                  ssessment:                1.  Occipital neuralgia                2.  Cervicalgia                3.  4  5  6 Chronic migraine without aura, with intractable migraine, so stated, with status migrainosus (post traumatic) post concussive?  Depression   TMJ - R sided   Insomnia - SHIRA      Head CT  Findings: There is no evidence of acute or recent major vascular territory cerebral infarction, parenchymal  hemorrhage, or intra-axial mass.  There are no extra-axial masses or abnormal fluid collections.  The ventricular system is within normal limits of size for age and shows no distortion by mass-effect or evidence of hydrocephalus.  There is no fracture or destructive osseous lesion.  The extracranial soft tissues are unremarkable.  The visualized paranasal sinuses and mastoid air cells are clear.   Impression    No acute intracranial abnormality.  ______________________________________     Electronically signed by resident: KRISSY MAYERS MD  Date: 03/14/16  Time: 17:09                                               Plan:                1. Prophylactic medication -   Despiramine 25 mg AM (for dizziness)   Cymbalta 120 mg daily   Aricept 20 mg daily   Neurontin 900 mg TID    Magnesium 200 mg daily  Topamax 200 mg BID   Cont B2, B6 supplements   2, Breakthrough headache - zanaflex 8 mg Q8, flurbiprofen   PRN percocet Q=30  Multiple alternative treatment options were offered to the patient   3. Refrain from over counter pain medications. Discussed medication overuse headache.cont Magnesium 400 mg PO BID   4. Occipital neuralgia - If medical management is ineffective may consider occipital nerve blocks.   5. I again urged the patient to keep a headache calender.    f/up Dr. Rock for TBI   Vit D supplements    f/up sleep clinic - CPAP pending      Pain cream   B12 shot today      Cont AJOVY (April 2019- ) No side effects      ONB 2 weeks prior to next BOTOX       BOTOX was performed as an indicated therapy for intractable chronic migraine headaches given that the patient failed > 5 prophylactic medications  Botulinum Toxin Injection Procedure   Pre-operative diagnosis: Chronic migraine   Post-operative diagnosis: Same   Procedure: Chemical neurolysis   After risks and benefits were explained including bleeding, infection, worsening of pain, damage to the areas being injected, weakness of muscles, loss of muscle control,  dysphagia if injecting the head or neck, facial droop if injecting the facial area, painful injection, allergic or other reaction to the medications being injected, and the failure of the procedure to help the problem, a signed consent was obtained.   The patient was placed in a comfortable area and the sites to be treated were identified.The area to be treated was prepped three times with alcohol and the alcohol allowed to dry. Next, a 30 gauge needle was used to inject the medication in the area to be treated.   Area(s) injected:   Total Botox used: 175 Units   Botox wastage: 25 Units   Injection sites:    muscle bilaterally ( a total of 10 units divided into 2 sites)   Procerus muscle (5 units)   Frontalis muscle bilaterally (a total of 20 units divided into 4 sites)   Temporalis muscle bilaterally (a total of 50 units divided into 8 sites) + 2 sites   Occipitalis muscle bilaterally (a total of 30 units divided into 6 sites)   Cervical paraspinal muscles (a total of 20 units divided into 4 sites)   Trapezius muscle bilaterally (a total of 30 units divided into 6 sites)   Masseter 5 units catalina      Complications: none       RTC for the next Botox injection: 10 weeks

## 2020-06-28 ENCOUNTER — NURSE TRIAGE (OUTPATIENT)
Dept: ADMINISTRATIVE | Facility: CLINIC | Age: 55
End: 2020-06-28

## 2020-06-29 ENCOUNTER — TELEPHONE (OUTPATIENT)
Dept: CARDIOLOGY | Facility: HOSPITAL | Age: 55
End: 2020-06-29

## 2020-06-29 PROCEDURE — 99454 REM MNTR PHYSIOL PARAM 16-30: CPT | Mod: PBBFAC | Performed by: INTERNAL MEDICINE

## 2020-06-29 NOTE — TELEPHONE ENCOUNTER
"Pt contacted the clinic on this am with c/o feeling her heart beating intermittently in the area of her left side.  Pt has had this in the past and was brought into the clinic for reprogramming on 4/28/20.  Pt stated last night is the first time she has felt it since that time.  Pt also stated she heard "a buzzing sound" on yesterday and she was not sure if it was coming from her ICD or if it may have been from something else in her house.  Informed the pt that the Type of ICD she has does Not have any type of buzzing or vibratory ability. Pt with hx of LV lead impedance > 3000 ohms and is currently scheduled for a Venogram on 7/22/20 with possible LV lead revision shortly thereafter.  Reviewed the pt's c/o with the Device RN.  Called the pt back in informed her being that she has only felt the thumping on the left side once, she is to attempt to change the position she may be lying in and see if it goes away.  Pt was also instructed to contact the clinic should she begin to feel this more frequently or in any position to contact the [facility] Clinic.  Pt verbalized understanding to all of the above.  Pt appreciated the call.  "

## 2020-06-29 NOTE — TELEPHONE ENCOUNTER
Spoke with patient she states that her Pacemaker/ICD is making buzzing sounds.  States that she has heard 3 buzzing sounds within the last few hours.  Denies that she has received any shocks.  Patient states that she feels fine and has no symptoms to report at this time.  Spoke with on call provider Dr. Lewis.  Dr. Lewis states that he will have someone follow up with patient on tomorrow.  Also states that if patient receives a shock she is to go to the ER.  Patient instructed to go to ER if she receives a shock or develops any symptoms.  Patient verbalized understanding.  .      Reason for Disposition   [place] or felt device alarm (e.g. beeping or buzzing)    Additional Information   Negative: Received 2 or more ICD SHOCKS within a 4 hour period   Negative: [1] Received an ICD SHOCK AND [2] passed out (i.e., fainted, was unconscious)   Negative: [1] Received an ICD SHOCK AND [2] any of these symptoms: chest pain, extreme fatigue, lightheadedness, palpitations, shortness of breath   Negative: [1] Received an ICD SHOCK AND [2] feels unwell afterwards   Negative: Difficult to awaken or acting confused (e.g., disoriented, slurred speech)   Negative: Sounds like a life-threatening emergency to the triager   Negative: [1] Received an ICD SHOCK AND [2] chest pain before or after   Negative: Heart beating < 60 beats per minute OR > 140 beats per minute   Negative: Incision gaping open   Negative: [1] Received 2 or more ICD SHOCKS in a 24 hour period AND [2] feels well   Negative: Passed out (i.e., lost consciousness, collapsed and was not responding)   Negative: Patient sounds very sick or weak to the triager   Negative: Incision looks infected (spreading redness, pain)    Protocols used: ICD AND PACEMAKER SYMPTOMS AND [id number]

## 2020-06-30 ENCOUNTER — EXTERNAL CHRONIC CARE MANAGEMENT (OUTPATIENT)
Dept: PRIMARY CARE CLINIC | Facility: CLINIC | Age: 55
End: 2020-06-30
Payer: MEDICARE

## 2020-06-30 PROCEDURE — 99490 CHRNC CARE MGMT STAFF 1ST 20: CPT | Mod: PBBFAC | Performed by: INTERNAL MEDICINE

## 2020-06-30 PROCEDURE — 99490 PR CHRONIC CARE MGMT, 1ST 20 MIN: ICD-10-PCS | Mod: S$PBB,,, | Performed by: INTERNAL MEDICINE

## 2020-06-30 PROCEDURE — 99490 CHRNC CARE MGMT STAFF 1ST 20: CPT | Mod: S$PBB,,, | Performed by: INTERNAL MEDICINE

## 2020-07-02 ENCOUNTER — TELEPHONE (OUTPATIENT)
Dept: SLEEP MEDICINE | Facility: OTHER | Age: 55
End: 2020-07-02

## 2020-07-07 ENCOUNTER — TELEPHONE (OUTPATIENT)
Dept: PHARMACY | Facility: CLINIC | Age: 55
End: 2020-07-07

## 2020-07-15 ENCOUNTER — LAB VISIT (OUTPATIENT)
Dept: LAB | Facility: HOSPITAL | Age: 55
End: 2020-07-15
Attending: INTERNAL MEDICINE
Payer: MEDICARE

## 2020-07-15 DIAGNOSIS — T82.198A MALFUNCTION OF ELECTRODE LEAD OF IMPLANTABLE CARDIOVERTER-DEFIBRILLATOR (ICD): ICD-10-CM

## 2020-07-15 LAB
ANION GAP SERPL CALC-SCNC: 5 MMOL/L (ref 8–16)
BUN SERPL-MCNC: 11 MG/DL (ref 6–20)
CALCIUM SERPL-MCNC: 9.1 MG/DL (ref 8.7–10.5)
CHLORIDE SERPL-SCNC: 107 MMOL/L (ref 95–110)
CO2 SERPL-SCNC: 24 MMOL/L (ref 23–29)
CREAT SERPL-MCNC: 0.9 MG/DL (ref 0.5–1.4)
EST. GFR  (AFRICAN AMERICAN): >60 ML/MIN/1.73 M^2
EST. GFR  (NON AFRICAN AMERICAN): >60 ML/MIN/1.73 M^2
GLUCOSE SERPL-MCNC: 95 MG/DL (ref 70–110)
POTASSIUM SERPL-SCNC: 4.4 MMOL/L (ref 3.5–5.1)
SODIUM SERPL-SCNC: 136 MMOL/L (ref 136–145)

## 2020-07-15 PROCEDURE — 80048 BASIC METABOLIC PNL TOTAL CA: CPT

## 2020-07-15 PROCEDURE — 36415 COLL VENOUS BLD VENIPUNCTURE: CPT

## 2020-07-16 ENCOUNTER — PATIENT OUTREACH (OUTPATIENT)
Dept: OTHER | Facility: OTHER | Age: 55
End: 2020-07-16

## 2020-07-16 NOTE — PROGRESS NOTES
[facility]: Health  Follow-Up    The history is provided by the patient.         Reason for review: Blood pressure not at goal  Care Team received high BP alert.          Topics Covered on Call: physical activity and Diet    Additional Follow-up details: She had an alert reading that was 151/123 - pt states the cuff was on incorrectly, she rechecked immediately. Will remove from record.      Pt has been taking her readings prior to medication. Next week she will start taking them in the morning 1-2 hours after her morning medication.          Diet-Change      Dietary Improvements:Drinking less soda. Reports adequate water intake.     Dietary Indiscretions:        Additional diet details:    Physical Activity-Change      She added walking to Her physical activity routine.        She physical activity details: She has been walking around inside her building. Provided encouragement to continue.       Medication Adherence-Medication Adherence not addressed.      Substance, Sleep, Stress-Not assessed    PLAN  Additional monitoring needed:  Continue current diet/physical activity routine:  Provided patient education:  Reviewed Device Techniques  Patient verbalizes understanding. Patient did not express questions or concerns and patient has contact information if needed.    Explained the importance of self-monitoring and medication adherence. Encouraged the patient to communicate with their health  for lifestyle modifications to help improve or maintain a healthy lifestyle.        There are no preventive care reminders to display for this patient.    Last 5 Patient Entered Readings                                      Current 30 Day Average: 155/94     Recent Readings 7/15/2020 7/15/2020 7/14/2020 7/14/2020 7/13/2020    SBP (mmHg) 155 155 169 169 168    DBP (mmHg) 92 92 87 87 95    Pulse 67 67 69 69 79

## 2020-07-17 ENCOUNTER — PATIENT MESSAGE (OUTPATIENT)
Dept: MEDSURG UNIT | Facility: HOSPITAL | Age: 55
End: 2020-07-17

## 2020-07-17 DIAGNOSIS — Z71.89 COMPLEX CARE COORDINATION: ICD-10-CM

## 2020-07-20 ENCOUNTER — PATIENT OUTREACH (OUTPATIENT)
Dept: OTHER | Facility: OTHER | Age: 55
End: 2020-07-20

## 2020-07-20 ENCOUNTER — LAB VISIT (OUTPATIENT)
Dept: SURGERY | Facility: CLINIC | Age: 55
End: 2020-07-20
Payer: MEDICARE

## 2020-07-20 DIAGNOSIS — T82.198A MALFUNCTION OF ELECTRODE LEAD OF IMPLANTABLE CARDIOVERTER-DEFIBRILLATOR (ICD): ICD-10-CM

## 2020-07-20 PROCEDURE — U0003 INFECTIOUS AGENT DETECTION BY NUCLEIC ACID (DNA OR RNA); SEVERE ACUTE RESPIRATORY SYNDROME CORONAVIRUS 2 (SARS-COV-2) (CORONAVIRUS DISEASE [COVID-19]), AMPLIFIED PROBE TECHNIQUE, MAKING USE OF HIGH THROUGHPUT TECHNOLOGIES AS DESCRIBED BY CMS-2020-01-R: HCPCS

## 2020-07-20 NOTE — TELEPHONE ENCOUNTER
Spoke to Ms. Chawla.  Pre-procedure instructions reviewed together in person in clinic exam room (she was here for covid testing and stopped by clinic).  Reviewed for venogram on 7/22/2020 arrival time is 2:00 pm.  Hold Metformin morning of procedure and two days following venogram due to dye.  Also instructed to take Contrast Prep (Prednisone 50 mg, Tagament 300 mg, and Benadryl 50 mg) on 7/22/2020 at 0200, 0800, and 1400.  Advised Ms. hCawla not to drive due to Benadryl use.  Ms. Chawla verbalizes understanding and appreciates time.

## 2020-07-21 ENCOUNTER — TELEPHONE (OUTPATIENT)
Dept: ELECTROPHYSIOLOGY | Facility: CLINIC | Age: 55
End: 2020-07-21

## 2020-07-21 LAB — SARS-COV-2 RNA RESP QL NAA+PROBE: NOT DETECTED

## 2020-07-21 NOTE — TELEPHONE ENCOUNTER
Attempting to reach Ms. Chawla to confirm venogram scheduled for tomorrow.  No answer.  Did not leave message as I met with her in person yesterday to review instructions.  Was calling today to reiterate instructions.

## 2020-07-22 ENCOUNTER — TELEPHONE (OUTPATIENT)
Dept: ELECTROPHYSIOLOGY | Facility: CLINIC | Age: 55
End: 2020-07-22

## 2020-07-22 ENCOUNTER — HOSPITAL ENCOUNTER (OUTPATIENT)
Facility: HOSPITAL | Age: 55
Discharge: HOME | End: 2020-07-22
Attending: INTERNAL MEDICINE | Admitting: INTERNAL MEDICINE
Payer: MEDICARE

## 2020-07-22 VITALS
HEIGHT: 65 IN | HEART RATE: 74 BPM | SYSTOLIC BLOOD PRESSURE: 166 MMHG | TEMPERATURE: 97 F | DIASTOLIC BLOOD PRESSURE: 74 MMHG | RESPIRATION RATE: 18 BRPM | BODY MASS INDEX: 48.32 KG/M2 | WEIGHT: 290 LBS | OXYGEN SATURATION: 94 %

## 2020-07-22 DIAGNOSIS — T82.198A MALFUNCTION OF ELECTRODE LEAD OF IMPLANTABLE CARDIOVERTER-DEFIBRILLATOR (ICD): ICD-10-CM

## 2020-07-22 DIAGNOSIS — I42.9 CARDIOMYOPATHY: ICD-10-CM

## 2020-07-22 DIAGNOSIS — I48.0 PAROXYSMAL ATRIAL FIBRILLATION: Primary | ICD-10-CM

## 2020-07-22 PROCEDURE — 75822 VEIN X-RAY ARMS/LEGS: CPT | Performed by: INTERNAL MEDICINE

## 2020-07-22 PROCEDURE — 75822 PR VENOGRAM EXTREMITY BILAT: ICD-10-PCS | Mod: 26,,, | Performed by: INTERNAL MEDICINE

## 2020-07-22 PROCEDURE — 93010 ELECTROCARDIOGRAM REPORT: CPT | Mod: ,,, | Performed by: INTERNAL MEDICINE

## 2020-07-22 PROCEDURE — 36005 INJECTION EXT VENOGRAPHY: CPT | Mod: 50,,, | Performed by: INTERNAL MEDICINE

## 2020-07-22 PROCEDURE — 63600175 PHARM REV CODE 636 W HCPCS: Performed by: INTERNAL MEDICINE

## 2020-07-22 PROCEDURE — 82962 GLUCOSE BLOOD TEST: CPT | Performed by: INTERNAL MEDICINE

## 2020-07-22 PROCEDURE — 36005 INJECTION EXT VENOGRAPHY: CPT | Mod: 50 | Performed by: INTERNAL MEDICINE

## 2020-07-22 PROCEDURE — 75822 VEIN X-RAY ARMS/LEGS: CPT | Mod: 26,,, | Performed by: INTERNAL MEDICINE

## 2020-07-22 PROCEDURE — 36005 PR INJECTION PROC,EXTREMITY,VENOGRAPHY: ICD-10-PCS | Mod: 50,,, | Performed by: INTERNAL MEDICINE

## 2020-07-22 PROCEDURE — 25500020 PHARM REV CODE 255: Performed by: INTERNAL MEDICINE

## 2020-07-22 PROCEDURE — 93005 ELECTROCARDIOGRAM TRACING: CPT

## 2020-07-22 PROCEDURE — 93010 EKG 12-LEAD: ICD-10-PCS | Mod: ,,, | Performed by: INTERNAL MEDICINE

## 2020-07-22 RX ORDER — ONDANSETRON 2 MG/ML
INJECTION INTRAMUSCULAR; INTRAVENOUS
Status: DISCONTINUED | OUTPATIENT
Start: 2020-07-22 | End: 2020-07-22 | Stop reason: HOSPADM

## 2020-07-22 RX ORDER — IODIXANOL 320 MG/ML
INJECTION, SOLUTION INTRAVASCULAR
Status: DISCONTINUED | OUTPATIENT
Start: 2020-07-22 | End: 2020-07-22 | Stop reason: HOSPADM

## 2020-07-22 NOTE — ASSESSMENT & PLAN NOTE
Malfunction of CRT-D lead with abrupt rise in LV lead impedance, associated palpitations and LH  Completed contrast prep  Prior to procedure, extensive discussion with patient regarding risks and benefits. Risks of procedure include but are not limited to bleeding, kidney injury and or failure, paralysis, stroke, and death.   The patient voices understanding and all questions have been answered. No further questions/concerns voiced at this time.    Proceed with venogram and possible intervention

## 2020-07-22 NOTE — SUBJECTIVE & OBJECTIVE
Past Medical History:   Diagnosis Date    Anticoagulant long-term use     Anxiety     Asthma     Atrial fibrillation     Brain anoxic injury     Cervicalgia 8/28/2014    CHI (closed head injury) 2/19/2013    Convulsion 5/30/2015    Decreased ROM of left shoulder 4/12/2017    Defibrillator activation 2013    Depression     Heart block     History of sudden cardiac arrest 2/2013    PEA arrest with subsequent long-QT    Hx of psychiatric care     Hypertension     Left atrial enlargement 4/11/2018    Pacemaker 2013    Paresthesia 11/1/2013    Prolonged Q-T interval on ECG 2/8/2013    Psychiatric problem     Seizures     Sleep difficulties     Stroke     weakness lt side    Therapy     Thyroid disease     Upper airway resistance syndrome 2/21/2017       Past Surgical History:   Procedure Laterality Date    breast reduction      CARDIAC DEFIBRILLATOR PLACEMENT      CARDIAC DEFIBRILLATOR PLACEMENT      CARPAL TUNNEL RELEASE Right     COLONOSCOPY N/A 5/7/2019    Procedure: COLONOSCOPY;  Surgeon: Juan Coffey MD;  Location: [facility] ENDO ([address] [place] [address]);  Service: Endoscopy;  Laterality: N/A;  per DR. Bustamante Pt to have balloon with DR. Coffey due to excesive looping, could not reach cecum - see telephone encounter 3/8/19 and last procedure report dated 6/24/14/ Per Piedad schedule as 90 min case- ERW  pacemaker/AICD Boitronik/   per , ok to hold Xareltox 2 days    HERNIA REPAIR      HIATAL HERNIA REPAIR      INSERT / REPLACE / REMOVE PACEMAKER  10/2017    CRT-D upgrade    TOTAL REDUCTION MAMMOPLASTY      TRIGGER FINGER RELEASE Left 8/2/2019    Procedure: RELEASE, TRIGGER FINGER left middle;  Surgeon: Matt Pugh Jr., MD;  Location: [facility];  Service: Plastics;  Laterality: Left;    TRIGGER FINGER RELEASE Right 2/11/2020    Procedure: RELEASE, TRIGGER FINGER middle;  Surgeon: Matt Pugh Jr., MD;  Location: [facility];  Service: Plastics;  Laterality: Right;    TUBAL LIGATION          Review of patient's allergies indicates:   Allergen Reactions    Aspirin Hives    Imitrex [sumatriptan] Palpitations    Penicillins Hives and Swelling    Shellfish containing products Anaphylaxis     seafood    Percocet [oxycodone-acetaminophen] Itching     States can take Hydrocodone    Reglan [metoclopramide hcl] Other (See Comments)     Parkinsonism        Current Facility-Administered Medications on File Prior to Encounter   Medication    lactated ringers infusion    lidocaine (PF) 10 mg/ml (1%) injection 5 mg     Current Outpatient Medications on File Prior to Encounter   Medication Sig    ARIPiprazole (ABILIFY) 15 MG Tab Take 1 tablet (15 mg total) by mouth once daily.    carvedilol (COREG) 25 MG tablet TAKE 1 TABLET BY MOUTH TWICE DAILY, WITH MEALS    cyanocobalamin, vitamin B-12, 1,000 mcg Subl Place 2,000 mcg under the tongue once daily.    donepezil (ARICEPT) 10 MG tablet Take 1 tablet (10 mg total) by mouth 2 (two) times daily.    DULoxetine (CYMBALTA) 60 MG capsule Take 1 capsule (60 mg total) by mouth 2 (two) times daily.    ergocalciferol (ERGOCALCIFEROL) 50,000 unit Cap Take 1 capsule (50,000 Units total) by mouth twice a week.    flurbiprofen (ANSAID) 100 MG tablet Take 1 tablet (100 mg total) by mouth 2 (two) times daily as needed (migraine).    furosemide (LASIX) 20 MG tablet Take 1 tablet (20 mg total) by mouth once daily.    gabapentin (NEURONTIN) 300 MG capsule Take 3 capsules (900 mg total) by mouth 3 (three) times daily.    losartan (COZAAR) 100 MG tablet Take 1 tablet (100 mg total) by mouth once daily.    magnesium 200 mg Tab Take 200 mg by mouth once daily. (Patient taking differently: Take 200 mg by mouth every other day. )    ondansetron (ZOFRAN-ODT) 4 MG TbDL Take 1 tablet (4 mg total) by mouth every 12 (twelve) hours as needed (nausea).    pantoprazole (PROTONIX) 40 MG tablet Take 1 tablet (40 mg total) by mouth once daily.    rosuvastatin (CRESTOR) 40 MG Tab  Take 1 tablet (40 mg total) by mouth every evening.    tiaGABine (GABITRIL) 4 MG tablet Take 1 tablet (4 mg total) by mouth every evening.    ubrogepant (UBRELVY)tablet 100 mg Take 1 tablet (100 mg total) by mouth 2 (two) times daily as needed for Migraine.    XARELTO 20 mg Tab TAKE 1 TABLET BY MOUTH DAILY WITH DINNER OR EVENING MEAL    zolpidem (AMBIEN) 10 mg Tab Take 1 tablet (10 mg total) by mouth nightly as needed.    baclofen (LIORESAL) 20 MG tablet Take 1 tablet (20 mg total) by mouth 3 (three) times daily.    blood sugar diagnostic Strp 1 strip by Misc.(Non-Drug; Combo Route) route 2 (two) times daily.    blood-glucose meter kit Please provide with insurance covered meter    clonazePAM (KLONOPIN) 1 MG tablet Take 1 tablet (1 mg total) by mouth daily as needed for Anxiety.    HYDROcodone-acetaminophen (NORCO) 5-325 mg per tablet Take 1 tablet by mouth every 6 (six) hours as needed for Pain.    lancets Misc 1 Device by Misc.(Non-Drug; Combo Route) route 2 (two) times daily.    metFORMIN (GLUCOPHAGE) 1000 MG tablet Take 1 tablet (1,000 mg total) by mouth 2 (two) times daily with meals. for 7 days    mupirocin (BACTROBAN) 2 % ointment Apply to affected area 3 times daily    silver sulfADIAZINE 1% (SILVADENE) 1 % cream Apply topically 2 (two) times daily.    traZODone (DESYREL) 100 MG tablet Take 1 or 1.5 tablets by mouth before bed as needed for insomnia    TRUE METRIX GLUCOSE METER Misc TEST BG BID     Family History     Problem Relation (Age of Onset)    COPD     Glaucoma Maternal Grandmother, Paternal Grandmother    Heart failure     Hypertension Mother        Tobacco Use    Smoking status: Never Smoker    Smokeless tobacco: Never Used   Substance and Sexual Activity    Alcohol use: No     Frequency: 2-4 times a month     Drinks per session: 1 or 2     Binge frequency: Never    Drug use: No    Sexual activity: Not Currently     Review of Systems   Constitution: Positive for malaise/fatigue.  Negative for decreased appetite, fever and weight gain.   HENT: Negative for congestion, hearing loss and nosebleeds.    Eyes: Negative for visual disturbance.   Cardiovascular: Positive for palpitations. Negative for chest pain, dyspnea on exertion, irregular heartbeat, leg swelling and syncope.   Respiratory: Negative for cough, shortness of breath and sleep disturbances due to breathing.    Endocrine: Negative for cold intolerance and heat intolerance.   Hematologic/Lymphatic: Negative for bleeding problem. Does not bruise/bleed easily.   Gastrointestinal: Negative for bloating, abdominal pain, change in bowel habit and heartburn.   Neurological: Positive for light-headedness. Negative for dizziness, loss of balance and numbness.   Psychiatric/Behavioral: Negative for altered mental status. The patient is not nervous/anxious.      Objective:     Vital Signs (Most Recent):  Temp: 98.6 °F (37 °C) (07/22/20 1451)  Pulse: 95 (07/22/20 1451)  Resp: 18 (07/22/20 1451)  BP: (!) 152/84 (07/22/20 1452)  SpO2: 97 % (07/22/20 1451) Vital Signs (24h Range):  Temp:  [98.6 °F (37 °C)] 98.6 °F (37 °C)  Pulse:  [95] 95  Resp:  [18] 18  SpO2:  [97 %] 97 %  BP: (146-152)/(77-84) 152/84       Weight: 131.5 kg (290 lb)  Body mass index is 48.26 kg/m².    SpO2: 97 %       Physical Exam   Constitutional: She is oriented to person, place, and time. Vital signs are normal. She appears well-developed and well-nourished.   HENT:   Head: Normocephalic.   Eyes: Pupils are equal, round, and reactive to light.   Neck: Normal range of motion. Neck supple. No JVD present. Carotid bruit is not present.   Cardiovascular: Normal rate, regular rhythm, S1 normal, S2 normal, normal heart sounds and intact distal pulses. PMI is not displaced. Exam reveals no gallop and no friction rub.   No murmur heard.  Pulses:       Radial pulses are 2+ on the right side and 2+ on the left side.        Dorsalis pedis pulses are 2+ on the right side and 2+ on the  left side.   Pulmonary/Chest: Effort normal and breath sounds normal. No accessory muscle usage. She has no decreased breath sounds. She has no wheezes.   Abdominal: Soft. Normal appearance and bowel sounds are normal. She exhibits no distension, no fluid wave and no ascites. There is no hepatosplenomegaly. There is no abdominal tenderness.   Musculoskeletal: Normal range of motion.         General: No edema.   Neurological: She is alert and oriented to person, place, and time. She has normal strength.   Skin: Skin is warm, dry and intact. No ecchymosis and no rash noted. No erythema.   Psychiatric: She has a normal mood and affect. Her speech is normal and behavior is normal. Judgment and thought content normal. Cognition and memory are normal.   Vitals reviewed.

## 2020-07-22 NOTE — HPI
Ms. Chawla is a 55 y.o. female who presents to [facility] on 7/22/20 for planned venogram with Dr. Mejia.     Ms. Chawla has a history of cardiac arrest, heart block, and ICD implantation.   Her post-arrest course was notable or intermittent 3rd-degree AV block. On 2/15/2013, a dual chamber ICD was implanted. Her EF prior to discharge was 60%. She had a negative coronary CTA during that admission.  In subsequent follow-up she underwent a pharmacologic stress ECHO in 2014 which showed a preserved LVEF and no ischemia.     Subsequent ICD interrogations show no VT, 100% RV pacing.  She was also diagnosed with symptomatic paroxysmal AF and was started on anticoagulation. She preferred coumadin.  She noted new onset dyspnea on exertion 9/2017. We performed an echocardiogram and her EF was 40-45% in setting of chronic RV pacing. Recent PET stress showed no ischemia/infarct. We proceeded with upgrade to CRT-D which was performed on 9/27/2017.  Most recent ECHO 9/2018 noted EF normalized at 55-60%.   In 04/2020 remote device interrogation indicated an abrupt rise in LV lead impedance over the past 4 days, now >3000 Ohms.   Presents today with reports of ongoing fatigue, palpitations, and LH.

## 2020-07-22 NOTE — PLAN OF CARE
Received report from Ginny WALLACE. Patient s/p Venogram, AAOx4. VSS, no c/o pain or discomfort at this time, resp even and unlabored. Post procedure protocol reviewed with patient and patient's family. Understanding verbalized. Family members at bedside. Nurse call bell within reach. Will continue to monitor per post procedure protocol.

## 2020-07-22 NOTE — Clinical Note
A venogram was performed in the left axillary vein. The vessel was injected with hand injection with 10 mL of contrast.

## 2020-07-22 NOTE — PLAN OF CARE
Patient discharged per MD orders. Instructions given on medications, wound care, activity, signs of infection, when to call MD, and follow up appointments. Pt verbalized understanding.  Patient AAOx4, VSS, no c/o pain or discomfort at this time. PIV removed. Patient left unit via wheelchair with transport and family.

## 2020-07-22 NOTE — H&P
[facility] Cardiac Unit  Cardiac Electrophysiology  History and Physical     Admission Date: 7/22/2020  Code Status: Prior   Attending Provider: Avelino Mejia MD   Principal Problem:<principal problem not specified>    Subjective:     Chief Complaint:  Palpitations     HPI:  Ms. Chawla is a 55 y.o. female who presents to [facility] on 7/22/20 for planned venogram with Dr. Mejia.     Ms. Chawla has a history of cardiac arrest, heart block, and ICD implantation.   Her post-arrest course was notable or intermittent 3rd-degree AV block. On 2/15/2013, a dual chamber ICD was implanted. Her EF prior to discharge was 60%. She had a negative coronary CTA during that admission.  In subsequent follow-up she underwent a pharmacologic stress ECHO in 2014 which showed a preserved LVEF and no ischemia.     Subsequent ICD interrogations show no VT, 100% RV pacing.  She was also diagnosed with symptomatic paroxysmal AF and was started on anticoagulation. She preferred coumadin.  She noted new onset dyspnea on exertion 9/2017. We performed an echocardiogram and her EF was 40-45% in setting of chronic RV pacing. Recent PET stress showed no ischemia/infarct. We proceeded with upgrade to CRT-D which was performed on 9/27/2017.  Most recent ECHO 9/2018 noted EF normalized at 55-60%.   In 04/2020 remote device interrogation indicated an abrupt rise in LV lead impedance over the past 4 days, now >3000 Ohms.   Presents today with reports of ongoing fatigue, palpitations, and LH.     EKG ASVP 83 bpm  Kidney function stable, Cr 0.9    Past Medical History:   Diagnosis Date    Anticoagulant long-term use     Anxiety     Asthma     Atrial fibrillation     Brain anoxic injury     Cervicalgia 8/28/2014    CHI (closed head injury) 2/19/2013    Convulsion 5/30/2015    Decreased ROM of left shoulder 4/12/2017    Defibrillator activation 2013    Depression     Heart block     History of sudden cardiac arrest 2/2013     PEA arrest with subsequent long-QT    Hx of psychiatric care     Hypertension     Left atrial enlargement 4/11/2018    Pacemaker 2013    Paresthesia 11/1/2013    Prolonged Q-T interval on ECG 2/8/2013    Psychiatric problem     Seizures     Sleep difficulties     Stroke     weakness lt side    Therapy     Thyroid disease     Upper airway resistance syndrome 2/21/2017       Past Surgical History:   Procedure Laterality Date    breast reduction      CARDIAC DEFIBRILLATOR PLACEMENT      CARDIAC DEFIBRILLATOR PLACEMENT      CARPAL TUNNEL RELEASE Right     COLONOSCOPY N/A 5/7/2019    Procedure: COLONOSCOPY;  Surgeon: Juan Coffey MD;  Location: [facility] ENDO ([address] [place] [address]);  Service: Endoscopy;  Laterality: N/A;  per DR. Bustamante Pt to have balloon with DR. Coffey due to excesive looping, could not reach cecum - see telephone encounter 3/8/19 and last procedure report dated 6/24/14/ Per Piedad schedule as 90 min case- ERW  pacemaker/AICD Boitronik/   per , ok to hold Xareltox 2 days    HERNIA REPAIR      HIATAL HERNIA REPAIR      INSERT / REPLACE / REMOVE PACEMAKER  10/2017    CRT-D upgrade    TOTAL REDUCTION MAMMOPLASTY      TRIGGER FINGER RELEASE Left 8/2/2019    Procedure: RELEASE, TRIGGER FINGER left middle;  Surgeon: Matt Pugh Jr., MD;  Location: [facility];  Service: Plastics;  Laterality: Left;    TRIGGER FINGER RELEASE Right 2/11/2020    Procedure: RELEASE, TRIGGER FINGER middle;  Surgeon: Matt Pugh Jr., MD;  Location: [facility];  Service: Plastics;  Laterality: Right;    TUBAL LIGATION         Review of patient's allergies indicates:   Allergen Reactions    Aspirin Hives    Imitrex [sumatriptan] Palpitations    Penicillins Hives and Swelling    Shellfish containing products Anaphylaxis     seafood    Percocet [oxycodone-acetaminophen] Itching     States can take Hydrocodone    Reglan [metoclopramide hcl] Other (See Comments)     Parkinsonism        Current  Facility-Administered Medications on File Prior to Encounter   Medication    lactated ringers infusion    lidocaine (PF) 10 mg/ml (1%) injection 5 mg     Current Outpatient Medications on File Prior to Encounter   Medication Sig    ARIPiprazole (ABILIFY) 15 MG Tab Take 1 tablet (15 mg total) by mouth once daily.    carvedilol (COREG) 25 MG tablet TAKE 1 TABLET BY MOUTH TWICE DAILY, WITH MEALS    cyanocobalamin, vitamin B-12, 1,000 mcg Subl Place 2,000 mcg under the tongue once daily.    donepezil (ARICEPT) 10 MG tablet Take 1 tablet (10 mg total) by mouth 2 (two) times daily.    DULoxetine (CYMBALTA) 60 MG capsule Take 1 capsule (60 mg total) by mouth 2 (two) times daily.    ergocalciferol (ERGOCALCIFEROL) 50,000 unit Cap Take 1 capsule (50,000 Units total) by mouth twice a week.    flurbiprofen (ANSAID) 100 MG tablet Take 1 tablet (100 mg total) by mouth 2 (two) times daily as needed (migraine).    furosemide (LASIX) 20 MG tablet Take 1 tablet (20 mg total) by mouth once daily.    gabapentin (NEURONTIN) 300 MG capsule Take 3 capsules (900 mg total) by mouth 3 (three) times daily.    losartan (COZAAR) 100 MG tablet Take 1 tablet (100 mg total) by mouth once daily.    magnesium 200 mg Tab Take 200 mg by mouth once daily. (Patient taking differently: Take 200 mg by mouth every other day. )    ondansetron (ZOFRAN-ODT) 4 MG TbDL Take 1 tablet (4 mg total) by mouth every 12 (twelve) hours as needed (nausea).    pantoprazole (PROTONIX) 40 MG tablet Take 1 tablet (40 mg total) by mouth once daily.    rosuvastatin (CRESTOR) 40 MG Tab Take 1 tablet (40 mg total) by mouth every evening.    tiaGABine (GABITRIL) 4 MG tablet Take 1 tablet (4 mg total) by mouth every evening.    ubrogepant (UBRELVY)tablet 100 mg Take 1 tablet (100 mg total) by mouth 2 (two) times daily as needed for Migraine.    XARELTO 20 mg Tab TAKE 1 TABLET BY MOUTH DAILY WITH DINNER OR EVENING MEAL    zolpidem (AMBIEN) 10 mg Tab Take 1  tablet (10 mg total) by mouth nightly as needed.    baclofen (LIORESAL) 20 MG tablet Take 1 tablet (20 mg total) by mouth 3 (three) times daily.    blood sugar diagnostic Strp 1 strip by Misc.(Non-Drug; Combo Route) route 2 (two) times daily.    blood-glucose meter kit Please provide with insurance covered meter    clonazePAM (KLONOPIN) 1 MG tablet Take 1 tablet (1 mg total) by mouth daily as needed for Anxiety.    HYDROcodone-acetaminophen (NORCO) 5-325 mg per tablet Take 1 tablet by mouth every 6 (six) hours as needed for Pain.    lancets Misc 1 Device by Misc.(Non-Drug; Combo Route) route 2 (two) times daily.    metFORMIN (GLUCOPHAGE) 1000 MG tablet Take 1 tablet (1,000 mg total) by mouth 2 (two) times daily with meals. for 7 days    mupirocin (BACTROBAN) 2 % ointment Apply to affected area 3 times daily    silver sulfADIAZINE 1% (SILVADENE) 1 % cream Apply topically 2 (two) times daily.    traZODone (DESYREL) 100 MG tablet Take 1 or 1.5 tablets by mouth before bed as needed for insomnia    TRUE METRIX GLUCOSE METER Misc TEST BG BID     Family History     Problem Relation (Age of Onset)    COPD     Glaucoma Maternal Grandmother, Paternal Grandmother    Heart failure     Hypertension Mother        Tobacco Use    Smoking status: Never Smoker    Smokeless tobacco: Never Used   Substance and Sexual Activity    Alcohol use: No     Frequency: 2-4 times a month     Drinks per session: 1 or 2     Binge frequency: Never    Drug use: No    Sexual activity: Not Currently     Review of Systems   Constitution: Positive for malaise/fatigue. Negative for decreased appetite, fever and weight gain.   HENT: Negative for congestion, hearing loss and nosebleeds.    Eyes: Negative for visual disturbance.   Cardiovascular: Positive for palpitations. Negative for chest pain, dyspnea on exertion, irregular heartbeat, leg swelling and syncope.   Respiratory: Negative for cough, shortness of breath and sleep disturbances  due to breathing.    Endocrine: Negative for cold intolerance and heat intolerance.   Hematologic/Lymphatic: Negative for bleeding problem. Does not bruise/bleed easily.   Gastrointestinal: Negative for bloating, abdominal pain, change in bowel habit and heartburn.   Neurological: Positive for light-headedness. Negative for dizziness, loss of balance and numbness.   Psychiatric/Behavioral: Negative for altered mental status. The patient is not nervous/anxious.      Objective:     Vital Signs (Most Recent):  Temp: 98.6 °F (37 °C) (07/22/20 1451)  Pulse: 95 (07/22/20 1451)  Resp: 18 (07/22/20 1451)  BP: (!) 152/84 (07/22/20 1452)  SpO2: 97 % (07/22/20 1451) Vital Signs (24h Range):  Temp:  [98.6 °F (37 °C)] 98.6 °F (37 °C)  Pulse:  [95] 95  Resp:  [18] 18  SpO2:  [97 %] 97 %  BP: (146-152)/(77-84) 152/84       Weight: 131.5 kg (290 lb)  Body mass index is 48.26 kg/m².    SpO2: 97 %       Physical Exam   Constitutional: She is oriented to person, place, and time. Vital signs are normal. She appears well-developed and well-nourished.   HENT:   Head: Normocephalic.   Eyes: Pupils are equal, round, and reactive to light.   Neck: Normal range of motion. Neck supple. No JVD present. Carotid bruit is not present.   Cardiovascular: Normal rate, regular rhythm, S1 normal, S2 normal, normal heart sounds and intact distal pulses. PMI is not displaced. Exam reveals no gallop and no friction rub.   No murmur heard.  Pulses:       Radial pulses are 2+ on the right side and 2+ on the left side.        Dorsalis pedis pulses are 2+ on the right side and 2+ on the left side.   Pulmonary/Chest: Effort normal and breath sounds normal. No accessory muscle usage. She has no decreased breath sounds. She has no wheezes.   Abdominal: Soft. Normal appearance and bowel sounds are normal. She exhibits no distension, no fluid wave and no ascites. There is no hepatosplenomegaly. There is no abdominal tenderness.   Musculoskeletal: Normal range of  motion.         General: No edema.   Neurological: She is alert and oriented to person, place, and time. She has normal strength.   Skin: Skin is warm, dry and intact. No ecchymosis and no rash noted. No erythema.   Psychiatric: She has a normal mood and affect. Her speech is normal and behavior is normal. Judgment and thought content normal. Cognition and memory are normal.   Vitals reviewed.      Assessment and Plan:     Malfunction of electrode lead of implantable cardioverter-defibrillator (ICD)  Malfunction of CRT-D lead with abrupt rise in LV lead impedance, associated palpitations and LH  Completed contrast prep  Prior to procedure, extensive discussion with patient regarding risks and benefits. Risks of procedure include but are not limited to bleeding, kidney injury and or failure, paralysis, stroke, and death.   The patient voices understanding and all questions have been answered. No further questions/concerns voiced at this time.    Proceed with venogram and possible intervention               Neelam Warner NP  Cardiac Electrophysiology  [facility]

## 2020-07-22 NOTE — HOSPITAL COURSE
Underwent bilateral venogram in the setting of LV lead malfunction.    Tolerated the procedure well, no acute complications.

## 2020-07-22 NOTE — Clinical Note
A venogram was performed in the right axillary vein. The vessel was injected with hand injection with 10 mL of contrast.

## 2020-07-22 NOTE — DISCHARGE SUMMARY
[facility] Short Stay Cardiac Unit  Cardiac Electrophysiology  Discharge Summary      Patient Name: Amna Chawla  MRN: [id number]  Admission Date: 7/22/2020  Hospital Length of Stay: 0 days  Discharge Date and Time:  07/22/2020 4:23 PM  Attending Physician: Avelino Mejia MD    Discharging Provider: Neelam Warner NP  Primary Care Physician: Tiffany Mina MD    HPI:   Ms. Chawla is a 55 y.o. female who presents to [facility] on 7/22/20 for planned venogram with Dr. Mejia.     Ms. Chawla has a history of cardiac arrest, heart block, and ICD implantation.   Her post-arrest course was notable or intermittent 3rd-degree AV block. On 2/15/2013, a dual chamber ICD was implanted. Her EF prior to discharge was 60%. She had a negative coronary CTA during that admission.  In subsequent follow-up she underwent a pharmacologic stress ECHO in 2014 which showed a preserved LVEF and no ischemia.     Subsequent ICD interrogations show no VT, 100% RV pacing.  She was also diagnosed with symptomatic paroxysmal AF and was started on anticoagulation. She preferred coumadin.  She noted new onset dyspnea on exertion 9/2017. We performed an echocardiogram and her EF was 40-45% in setting of chronic RV pacing. Recent PET stress showed no ischemia/infarct. We proceeded with upgrade to CRT-D which was performed on 9/27/2017.  Most recent ECHO 9/2018 noted EF normalized at 55-60%.   In 04/2020 remote device interrogation indicated an abrupt rise in LV lead impedance over the past 4 days, now >3000 Ohms.   Presents today with reports of ongoing fatigue, palpitations, and LH.     Procedure(s) (LRB):  Venogram, EP Lab (Bilateral)     Indwelling Lines/Drains at time of discharge:  Lines/Drains/Airways     Airway                 Airway - Non-Surgical 08/02/19 0915 Nasal Cannula 355 days                Hospital Course:  Underwent bilateral venogram in the setting of LV lead malfunction.    Tolerated the procedure well, no acute  complications.             Pending Diagnostic Studies:     None          Final Active Diagnoses:    Diagnosis Date Noted POA    Malfunction of electrode lead of implantable cardioverter-defibrillator (ICD) [T82.198A] 07/22/2020 Yes      Problems Resolved During this Admission:     Malfunction of electrode lead of implantable cardioverter-defibrillator (ICD)  s/p venogram in the setting of LV lead malfunction  Complex left axillary-subclavian vein occlusion found on venogram. Right system patent.    Discharge home today   Plan to follow up with Dr. Mejia for right system lead revision.                    Discharged Condition: good    Disposition: Home    Follow Up: TBD    Patient Instructions:      Notify your health care provider if you experience any of the following:  temperature >100.4     Notify your health care provider if you experience any of the following:  persistent nausea and vomiting or diarrhea     Notify your health care provider if you experience any of the following:  severe uncontrolled pain     Notify your health care provider if you experience any of the following:  redness, tenderness, or signs of infection (pain, swelling, redness, odor or green/yellow discharge around incision site)     Notify your health care provider if you experience any of the following:  difficulty breathing or increased cough     Notify your health care provider if you experience any of the following:  severe persistent headache     Notify your health care provider if you experience any of the following:  worsening rash     Activity as tolerated     Medications:  Reconciled Home Medications:      Medication List      CHANGE how you take these medications    magnesium 200 mg Tab  Take 200 mg by mouth once daily.  What changed: when to take this        CONTINUE taking these medications    AJOVY AUTOINJECTOR 225 mg/1.5 mL Atin  Generic drug: fremanezumab-vfrm  Inject 225 mg into the skin every 28 days.     ARIPiprazole 15  MG Tab  Commonly known as: ABILIFY  Take 1 tablet (15 mg total) by mouth once daily.     blood sugar diagnostic Strp  1 strip by Misc.(Non-Drug; Combo Route) route 2 (two) times daily.     * blood-glucose meter kit  Please provide with insurance covered meter     * TRUE METRIX GLUCOSE METER Misc  Generic drug: blood-glucose meter  TEST BG BID     carvediloL 25 MG tablet  Commonly known as: COREG  TAKE 1 TABLET BY MOUTH TWICE DAILY, WITH MEALS     cimetidine 300 MG tablet  Commonly known as: TAGAMET  Take 300 mg 13 hours prior, 7 hours prior, and 1 hour prior to test     clonazePAM 1 MG tablet  Commonly known as: KLONOPIN  Take 1 tablet (1 mg total) by mouth daily as needed for Anxiety.     cyanocobalamin (vitamin B-12) 1,000 mcg Subl  Place 2,000 mcg under the tongue once daily.     diphenhydrAMINE 25 mg capsule  Commonly known as: BenadryL  Take 2 capsules (50 mg total) by mouth as needed for Itching (Take 50 mg 13 hours prior, 7 hours prior, and 1 hour prior to test).     donepeziL 10 MG tablet  Commonly known as: ARICEPT  Take 1 tablet (10 mg total) by mouth 2 (two) times daily.     DULoxetine 60 MG capsule  Commonly known as: CYMBALTA  Take 1 capsule (60 mg total) by mouth 2 (two) times daily.     ergocalciferol 50,000 unit Cap  Commonly known as: ERGOCALCIFEROL  Take 1 capsule (50,000 Units total) by mouth twice a week.     flurbiprofen 100 MG tablet  Commonly known as: ANSAID  Take 1 tablet (100 mg total) by mouth 2 (two) times daily as needed (migraine).     furosemide 20 MG tablet  Commonly known as: LASIX  Take 1 tablet (20 mg total) by mouth once daily.     gabapentin 300 MG capsule  Commonly known as: NEURONTIN  Take 3 capsules (900 mg total) by mouth 3 (three) times daily.     HYDROcodone-acetaminophen 5-325 mg per tablet  Commonly known as: NORCO  Take 1 tablet by mouth every 6 (six) hours as needed for Pain.     lancets Misc  1 Device by Misc.(Non-Drug; Combo Route) route 2 (two) times daily.      losartan 100 MG tablet  Commonly known as: COZAAR  Take 1 tablet (100 mg total) by mouth once daily.     metFORMIN 1000 MG tablet  Commonly known as: GLUCOPHAGE  Take 1 tablet (1,000 mg total) by mouth 2 (two) times daily with meals. for 7 days     mupirocin 2 % ointment  Commonly known as: BACTROBAN  Apply to affected area 3 times daily     ondansetron 4 MG Tbdl  Commonly known as: ZOFRAN-ODT  Take 1 tablet (4 mg total) by mouth every 12 (twelve) hours as needed (nausea).     pantoprazole 40 MG tablet  Commonly known as: PROTONIX  Take 1 tablet (40 mg total) by mouth once daily.     predniSONE 50 MG Tab  Commonly known as: DELTASONE  Take 50 mg 13 hours prior, 7 hours prior, and 1 hour prior to test     rosuvastatin 40 MG Tab  Commonly known as: CRESTOR  Take 1 tablet (40 mg total) by mouth every evening.     silver sulfADIAZINE 1% 1 % cream  Commonly known as: SILVADENE  Apply topically 2 (two) times daily.     tiaGABine 4 MG tablet  Commonly known as: GABITRIL  Take 1 tablet (4 mg total) by mouth every evening.     traZODone 100 MG tablet  Commonly known as: DESYREL  Take 1 or 1.5 tablets by mouth before bed as needed for insomnia     ubrogepant 100 mg tablet  Commonly known as: ubrogepant  Take 1 tablet (100 mg total) by mouth 2 (two) times daily as needed for Migraine.     XARELTO 20 mg Tab  Generic drug: rivaroxaban  TAKE 1 TABLET BY MOUTH DAILY WITH DINNER OR EVENING MEAL     zolpidem 10 mg Tab  Commonly known as: AMBIEN  Take 1 tablet (10 mg total) by mouth nightly as needed.         * This list has 2 medication(s) that are the same as other medications prescribed for you. Read the directions carefully, and ask your doctor or other care provider to review them with you.            STOP taking these medications    baclofen 20 MG tablet  Commonly known as: LIORESAL            Time spent on the discharge of patient: 15 minutes    Neelam Warner NP  Cardiac Electrophysiology  [facility]  Stay Cardiac [facility]

## 2020-07-22 NOTE — DISCHARGE INSTRUCTIONS
Understanding Venogram  A venogram is a type of imaging procedure. It uses X-rays and a special dye to look at veins in your body. The dye is called a contrast material. An X-ray is a picture of the inside of your body. Low levels of radiation create the image.  Why a venogram is done  A venogram is often done to find blood clots in the veins. It can help diagnose deep vein thrombosis. Thats when blood clots occur in a deep vein, often in your legs. It is also done to find other problems related to the veins. These include varicose veins, a vein defect, or the narrowing of a vein.  How a venogram is done  This procedure is often done on an outpatient basis. That means you can go home afterward. During the procedure:  · You will lie down on an exam table. The table may tilt to help the dye move through your body.  · You may be given medicine so you dont feel any pain.  · A healthcare provider will put a catheter, or needle, into one of your veins. The location depends on which body part is being X-rayed.  · He or she will inject the dye into your vein.  · X-rays are taken as the dye moves through your body.  · You may be asked to hold your breath during the procedure.  Risks of a venogram  · Pain  · Blood clots  · Allergy to the dye  · Kidney problems  Date Last Reviewed: 6/1/2016  © 2018-8857 [employer]. [address] [place] [id number]. All rights reserved. This information is not intended as a substitute for professional medical care. Always follow your healthcare professional's instructions.

## 2020-07-22 NOTE — TELEPHONE ENCOUNTER
Attempting to reach Ms. Chawla to schedule follow up.  No answer.  Message left letting her know f/u is scheduled for 8/14/2020 and to call with any questions or concerns.     Note- Message also sent via portal with alert to notify me if not read within one week.

## 2020-07-22 NOTE — ASSESSMENT & PLAN NOTE
s/p venogram in the setting of LV lead malfunction  Complex left axillary-subclavian vein occlusion found on venogram. Right system patent.    Discharge home today   Plan to follow up with Dr. Mejia for right system lead revision.

## 2020-07-23 LAB — POCT GLUCOSE: 186 MG/DL (ref 70–110)

## 2020-07-31 ENCOUNTER — EXTERNAL CHRONIC CARE MANAGEMENT (OUTPATIENT)
Dept: PRIMARY CARE CLINIC | Facility: CLINIC | Age: 55
End: 2020-07-31
Payer: MEDICARE

## 2020-07-31 PROCEDURE — 99490 CHRNC CARE MGMT STAFF 1ST 20: CPT | Mod: S$PBB,,, | Performed by: INTERNAL MEDICINE

## 2020-07-31 PROCEDURE — 99490 PR CHRONIC CARE MGMT, 1ST 20 MIN: ICD-10-PCS | Mod: S$PBB,,, | Performed by: INTERNAL MEDICINE

## 2020-07-31 PROCEDURE — 99490 CHRNC CARE MGMT STAFF 1ST 20: CPT | Mod: PBBFAC | Performed by: INTERNAL MEDICINE

## 2020-08-03 ENCOUNTER — OFFICE VISIT (OUTPATIENT)
Dept: INTERNAL MEDICINE | Facility: CLINIC | Age: 55
End: 2020-08-03
Payer: COMMERCIAL

## 2020-08-03 ENCOUNTER — PATIENT MESSAGE (OUTPATIENT)
Dept: INTERNAL MEDICINE | Facility: CLINIC | Age: 55
End: 2020-08-03

## 2020-08-03 VITALS
SYSTOLIC BLOOD PRESSURE: 120 MMHG | OXYGEN SATURATION: 95 % | HEIGHT: 64 IN | HEART RATE: 82 BPM | BODY MASS INDEX: 50.02 KG/M2 | DIASTOLIC BLOOD PRESSURE: 74 MMHG | WEIGHT: 293 LBS
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
## 2020-08-03 PROCEDURE — 99213 OFFICE O/P EST LOW 20 MIN: CPT | Mod: S$GLB,,, | Performed by: INTERNAL MEDICINE

## 2020-08-03 PROCEDURE — 99999 PR PBB SHADOW E&M-EST. PATIENT-LVL V: CPT | Mod: PBBFAC,,, | Performed by: INTERNAL MEDICINE

## 2020-08-03 PROCEDURE — 99999 PR PBB SHADOW E&M-EST. PATIENT-LVL V: ICD-10-PCS | Mod: PBBFAC,,, | Performed by: INTERNAL MEDICINE

## 2020-08-03 PROCEDURE — 99215 OFFICE O/P EST HI 40 MIN: CPT | Mod: PBBFAC | Performed by: INTERNAL MEDICINE

## 2020-08-03 PROCEDURE — 99213 PR OFFICE/OUTPT VISIT, EST, LEVL III, 20-29 MIN: ICD-10-PCS | Mod: S$GLB,,, | Performed by: INTERNAL MEDICINE

## 2020-08-03 RX ORDER — CLOPIDOGREL BISULFATE 75 MG/1
75 TABLET ORAL DAILY
COMMUNITY
End: 2022-02-15 | Stop reason: SDUPTHER

## 2020-08-03 NOTE — PROGRESS NOTES
Digital Medicine: Clinician Introduction    Amna Chawla is a 55 y.o. female who is newly enrolled in the Digital Medicine Clinic.    Called to welcome patient to [facility] and to introduce myself. Reviewed goals of therapy, medication list/allergies, monitoring requirements and technique with patient. Patient stated that she has been struggling to get her BP cuff on by herself and has been getting her daughter to help her      The history is provided by the patient.   Troubleshooting description: Instructed patient to go to [facility] for further support .     Review of patient's allergies indicates:   -- Aspirin -- Hives   -- Imitrex (sumatriptan) -- Palpitations   -- Penicillins -- Hives and Swelling   -- Shellfish containing products -- Anaphylaxis    --  seafood   -- Percocet (oxycodone-acetaminophen) -- Itching    --  States can take Hydrocodone   -- Reglan (metoclopramide hcl) -- Other (See Comments)    --  Parkinsonism  Completed Medication Reconciliation  Verified pharmacy information.    HYPERTENSION    Explained non-pharmacologic therapies like low salt diet and physical activity can reduce blood pressure.       Explained that we expect patient to submit several blood pressure readings per week at random times of the day, but at least 30 minutes after taking blood pressure medications. Instructed patient not to allow anyone else to use their blood pressure monitor and phone as data submitted is directly entered into medical record. Reviewed and confirmed appropriate blood pressure monitoring technique.           Patient's BP goal is less than or equal to 130/80. Patients BP average is 157/93 mmHg, which is above goal, per 2017 ACC/AHA Hypertension Guidelines. Patient believes her elevated BP was due to incorrect monitoring technique      Last 5 Patient Entered Readings                                      Current 30 Day Average: 157/93     Recent Readings 7/21/2020 7/21/2020 7/19/2020 7/19/2020 7/15/2020    SBP  (mmHg) 140 140 152 152 155    DBP (mmHg) 81 81 81 81 92    Pulse 78 78 80 80 67              Depression Screening  Amna Chawla screened positive on the depression screening. She is in treatment for depression and is currently taking medication     Sleep Apnea Screening  Patient previously diagnosed with SHIRA She reports she is not currently using CPAP.                 ASSESSMENT(S)  Patients BP average is 157/93 mmHg, which is above goal. Patient's BP goal is less than or equal to 130/80 per 2017 ACC/AHA Hypertension Guidelines.       PLAN  Additional monitoring needed: every other day or daily for the next 2 weeks  Continue current therapy:  Continue current diet/physical activity routine:  Instructed to charge device: also instructed her to take her BP cuff to be measured against her arm at the [facility]    Patient verbalizes understanding. Patient did not express questions or concerns and patient has contact information if needed.    Explained the importance of self-monitoring and medication adherence. Encouraged the patient to communicate with their health  for lifestyle modifications to help improve or maintain a healthy lifestyle.      Sent link to [facility] [employer] [facility] webpages and my contact information via ALTHIA for future questions.      Explained to the patient that the Digital Medicine team is not available for emergencies. Advised patient call [facility] On Call ([phone] or [phone]) or [phone] if needed.         There are no preventive care reminders to display for this patient.    Current Medication Regimen:  Hypertension Medications             carvedilol (COREG) 25 MG tablet TAKE 1 TABLET BY MOUTH TWICE DAILY, WITH MEALS    furosemide (LASIX) 20 MG tablet Take 1 tablet (20 mg total) by mouth once daily.    losartan (COZAAR) 100 MG tablet Take 1 tablet (100 mg total) by mouth once daily.

## 2020-08-03 NOTE — PATIENT INSTRUCTIONS
Tylenol or acetaminophen 500-1000mg every 8 hours as needed for pain/fevers.          Back Exercises: Leg Reach        Do this exercise on your hands and knees. Keep your knees under your hips and your hands under your shoulders. Keep your spine in a neutral position (not arched or sagging). Be sure to maintain your necks natural curve.  · Extend one leg straight back. Dont arch your back or let your head or body sag.  · Hold for 5 seconds. Return to starting position.  · Repeat 5 times.  · Switch legs.   © 4580-9367 [employer]. [address] [place] [address]. All rights reserved. This information is not intended as a substitute for professional medical care. Always follow your healthcare professional's instructions.        Back Exercises: Lower Back Stretch                        To start, sit in a chair with your feet flat on the floor. Shift your weight slightly forward to avoid rounding your back. Relax, and keep your ears, shoulders, and hips aligned.  · Sit with your feet well apart.  · Bend forward and touch the floor with the backs of your hands. Relax and let your body drop.  · Hold for 20 seconds. Return to starting position.  · Repeat 2 times.   © 7347-8244 [employer]. [address] [place] [address]. All rights reserved. This information is not intended as a substitute for professional medical care. Always follow your healthcare professional's instructions.        Leg and Knee Exercises: Leg Raise    This exercise is designed to stretch and strengthen your knee. Before beginning, read through all the instructions. While exercising, breathe normally and use smooth movements. If you feel any pain, stop the exercise. If pain persists, call your health care provider.  CAUTION  · Dont arch your back.  · Dont hunch your shoulders.   · Sit on the floor with your _________ leg straight, the other bent.  · Tighten the thigh muscles on the top of your straight  leg. You should feel the muscles contract. Raise that leg 6-8 inches. Then lower it slowly and steadily to the floor. Relax.  · Repeat ______ times.  Do ______ sets a day.  © 6782-8068 [employer]. [address] [place] [address]. All rights reserved. This information is not intended as a substitute for professional medical care. Always follow your healthcare professional's instructions.
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
## 2020-08-07 ENCOUNTER — TELEPHONE (OUTPATIENT)
Dept: PHARMACY | Facility: CLINIC | Age: 55
End: 2020-08-07

## 2020-08-07 DIAGNOSIS — I49.8 OTHER SPECIFIED CARDIAC ARRHYTHMIAS: Primary | ICD-10-CM

## 2020-08-14 ENCOUNTER — HOSPITAL ENCOUNTER (OUTPATIENT)
Dept: CARDIOLOGY | Facility: CLINIC | Age: 55
Discharge: HOME | End: 2020-08-14
Payer: MEDICARE

## 2020-08-14 ENCOUNTER — OFFICE VISIT (OUTPATIENT)
Dept: ELECTROPHYSIOLOGY | Facility: CLINIC | Age: 55
End: 2020-08-14
Payer: MEDICARE

## 2020-08-14 VITALS
HEART RATE: 72 BPM | HEIGHT: 64 IN | WEIGHT: 293 LBS | DIASTOLIC BLOOD PRESSURE: 48 MMHG | SYSTOLIC BLOOD PRESSURE: 130 MMHG | BODY MASS INDEX: 50.02 KG/M2

## 2020-08-14 DIAGNOSIS — I44.2 COMPLETE AV BLOCK: ICD-10-CM

## 2020-08-14 DIAGNOSIS — I42.8 NONISCHEMIC CARDIOMYOPATHY: ICD-10-CM

## 2020-08-14 DIAGNOSIS — T82.110D PACEMAKER LEAD MALFUNCTION, SUBSEQUENT ENCOUNTER: Primary | ICD-10-CM

## 2020-08-14 DIAGNOSIS — I10 ESSENTIAL HYPERTENSION: Chronic | ICD-10-CM

## 2020-08-14 DIAGNOSIS — Z86.74 HISTORY OF SUDDEN CARDIAC ARREST: ICD-10-CM

## 2020-08-14 DIAGNOSIS — I48.0 PAROXYSMAL ATRIAL FIBRILLATION: ICD-10-CM

## 2020-08-14 DIAGNOSIS — I49.8 OTHER SPECIFIED CARDIAC ARRHYTHMIAS: ICD-10-CM

## 2020-08-14 DIAGNOSIS — Z95.810 BIVENTRICULAR AUTOMATIC IMPLANTABLE CARDIOVERTER DEFIBRILLATOR IN SITU: ICD-10-CM

## 2020-08-14 PROBLEM — T82.110A PACEMAKER LEAD MALFUNCTION: Status: ACTIVE | Noted: 2020-08-14

## 2020-08-14 PROCEDURE — 93010 RHYTHM STRIP: ICD-10-PCS | Mod: S$PBB,,, | Performed by: INTERNAL MEDICINE

## 2020-08-14 PROCEDURE — 93005 ELECTROCARDIOGRAM TRACING: CPT | Mod: PBBFAC | Performed by: INTERNAL MEDICINE

## 2020-08-14 PROCEDURE — 93010 ELECTROCARDIOGRAM REPORT: CPT | Mod: S$PBB,,, | Performed by: INTERNAL MEDICINE

## 2020-08-14 PROCEDURE — 99999 PR PBB SHADOW E&M-EST. PATIENT-LVL V: ICD-10-PCS | Mod: PBBFAC,,, | Performed by: INTERNAL MEDICINE

## 2020-08-14 PROCEDURE — 99215 OFFICE O/P EST HI 40 MIN: CPT | Mod: PBBFAC,25 | Performed by: INTERNAL MEDICINE

## 2020-08-14 PROCEDURE — 99999 PR PBB SHADOW E&M-EST. PATIENT-LVL V: CPT | Mod: PBBFAC,,, | Performed by: INTERNAL MEDICINE

## 2020-08-14 PROCEDURE — 99215 OFFICE O/P EST HI 40 MIN: CPT | Mod: S$PBB,,, | Performed by: INTERNAL MEDICINE

## 2020-08-14 PROCEDURE — 99215 PR OFFICE/OUTPT VISIT, EST, LEVL V, 40-54 MIN: ICD-10-PCS | Mod: S$PBB,,, | Performed by: INTERNAL MEDICINE

## 2020-08-14 NOTE — PROGRESS NOTES
Patient ID:  Amna Chawla is a 55 y.o. female who presents for follow-up of Pacemaker Problem      HPI  Prior Hx:  I had the pleasure of seeing Amna Chawla in follow-up today for her history of cardiac arrest, heart block, and ICD implantation. As you are aware, she is a 55 year old female, former patient of Dr. Humberto Martins and patient of mine I cared for when she initially presented in cardiac arrest as well as followed in my general cardiology fellow clinic, [place] [facility] [place] having a cardiac [place].  She was working as a school  and collapsed at work.  On EMS arrival it was described that she was pulseless and given epinephrine (? Initially PEA) however after epinephrine noted to be in VF and was resuscitated with defibrillation/ACLS.  She underwent hypothermia protocol. Her post-arrest course was notable or intermittent 3rd-degree AV block. On 2/15/2013, a dual chamber ICD was implanted. Her EF prior to discharge was 60%. She had a negative coronary CTA during that admission.  In subsequent follow-up she underwent a pharmacologic stress ECHO in 2014 which showed a preserved LVEF and no ischemia.     Subsequent ICD interrogations show no VT, 100% RV pacing.  She was also diagnosed with symptomatic paroxysmal AF and was started on anticoagulation. She preferred coumadin.  She noted new onset dyspnea on exertion 9/2017. We performed an echocardiogram and her EF was 40-45% in setting of chronic RV pacing. Recent PET stress showed no ischemia/infarct. We proceeded with upgrade to CRT-D which was performed on 9/27/2017.     At Ms. Chawla's 3 month post CRT-D upgrade visit she noted more energy and improvement in the shortness of breath. She noted very rare diaphragm stimulation.     Ms. Chawla presents for 6 month post-CRT upgrade follow-up in 4/2018. We planned on getting an ECHO at the 6 month chu however it was done early at the 4 month chu. Her EF improved to 45-50%  on that study (see below). Her main issues are complex headaches for which she is seeing neurology.      Ms. Chawla presented for 6 month follow-up 10/2018. She was recently hospitalized for left sided weakness in setting of severe hypertension. CT head was negative for any acute ischemia/bleed. She briefly held xarelto in August 2018 for severe epistaxis and then resumed. Device interrogation noted no recent AF. Repeat ECHO 9/2018 noted EF normalized at 55-60%.      We received an alert for her LV lead regarding high impedance. Interrogation was consistent with LV lead fracture that we were unable to program around. She had evidence of intermittent LV lead non-capture. Outpatient venogram noted left subclavian vein occlusion.    Interim Hx:  Ms. Chawla returns for discussion regarding her fractured LV lead.    My interpretation of today's in clinic electrocardiogram is sinus rhythm with BiV paced QRS.      Review of Systems   Constitution: Negative for fever and malaise/fatigue.   HENT: Negative for congestion and sore throat.    Eyes: Negative for blurred vision and visual disturbance.   Cardiovascular: Negative for chest pain, dyspnea on exertion, irregular heartbeat, near-syncope, palpitations and syncope.   Respiratory: Negative for cough and shortness of breath.    Hematologic/Lymphatic: Negative for bleeding problem. Does not bruise/bleed easily.   Skin: Negative.    Musculoskeletal: Negative.    Gastrointestinal: Negative for bloating, abdominal pain, hematochezia and melena.   Neurological: Negative for focal weakness and weakness.   Psychiatric/Behavioral: Negative.         Objective:    Physical Exam   Constitutional: She is oriented to person, place, and time. She appears well-developed and well-nourished. No distress.   HENT:   Head: Normocephalic and atraumatic.   Eyes: Conjunctivae are normal. Right eye exhibits no discharge. Left eye exhibits no discharge.   Neck: Neck supple.   Cardiovascular:  Normal rate and regular rhythm. Exam reveals no gallop and no friction rub.   No murmur heard.  Pulmonary/Chest: Effort normal and breath sounds normal. No respiratory distress. She has no wheezes. She has no rales.   Abdominal: Soft. Bowel sounds are normal. She exhibits no distension. There is no abdominal tenderness.   Musculoskeletal:         General: No edema.   Neurological: She is alert and oriented to person, place, and time.   Skin: Skin is warm and dry. She is not diaphoretic.   Psychiatric: She has a normal mood and affect. Her behavior is normal. Judgment and thought content normal.   Vitals reviewed.        Assessment:       1. Pacemaker lead malfunction, subsequent encounter    2. Complete AV block    3. Biventricular automatic implantable cardioverter defibrillator in situ    4. Nonischemic cardiomyopathy from RV pacing, resolved with upgrade cardiac resynchronization therapy    5. Paroxysmal atrial fibrillation    6. History of sudden cardiac arrest    7. Essential hypertension         Plan:       In summary, Ms. Chawla is a pleasant 56 yo cardiac arrest survivor with VF noted during arrest, no ischemic heart disease with preserved LVEF, intermittent 3rd degree AV block, and symptomatic LVEF of 40% in setting of normal PET stress and chronic RV pacing who presents for CRT-D follow-up. Doing well. EF has normalized. Her LV lead is now fractured and she has an occluded left subclavian vein. Discussed lead revision. Will attempt to wire the LV lead with a long coronary angiogram wire and retain access with it and back out the LV lead with manual traction then attempt to upgrade access over the retained wire. If unable to do that then would bring back for complete extraction-reimplantation. We discussed the alternatives, benefits and risks of the procedure including pain, infection, bleeding, injury to lung causing pneumothorax requiring tube placement, injury to heart valves, puncture of the heart  leading to pericardial effusion or tamponade requiring tube drainage, heart attack, stroke and death. She understood and desires to proceed.    Plan  LV lead revision  Biotronik  Anesthesia  Hold xarelto x 48 hours    Thank you for allowing me to participate in the care of this patient. Please do not hesitate to call me with any questions or concerns.    Avelino Mejia MD, PhD  Cardiac Electrophysiology

## 2020-08-18 ENCOUNTER — HOSPITAL ENCOUNTER (OUTPATIENT)
Dept: SLEEP MEDICINE | Facility: OTHER | Age: 55
Discharge: HOME | End: 2020-08-18
Attending: INTERNAL MEDICINE
Payer: MEDICARE

## 2020-08-18 DIAGNOSIS — G47.33 OBSTRUCTIVE SLEEP APNEA: ICD-10-CM

## 2020-08-18 PROCEDURE — 95811 POLYSOM 6/>YRS CPAP 4/> PARM: CPT | Mod: 26,,, | Performed by: PSYCHIATRY & NEUROLOGY

## 2020-08-18 PROCEDURE — 95810 POLYSOM 6/> YRS 4/> PARAM: CPT

## 2020-08-18 PROCEDURE — 95811 PR POLYSOMNOGRAPHY W/CPAP: ICD-10-PCS | Mod: 26,,, | Performed by: PSYCHIATRY & NEUROLOGY

## 2020-08-19 NOTE — PROGRESS NOTES
Education was done via explanation of sleep study process and procedure. All questions were answered.    Pt. did not meet criteria for CPAP. Respiratory events were observed mainly during supine REM sleep.     Low lisa of 85% was observed in study. Pt. has a pacemaker. EKG revealed pacer beats and PVC. Soft to moderate snoring was heard. Thank  you letter was given in a.m.

## 2020-08-21 DIAGNOSIS — G47.33 OBSTRUCTIVE SLEEP APNEA: Primary | ICD-10-CM

## 2020-08-21 NOTE — PROCEDURES
"See imported Detailed Sleep Study Reports with raw data in  "Chart Review" under the "Media tab".      (This Sleep Study was interpreted by a Board Certified Sleep Specialist who conducted an epoch-by-epoch review of the entire raw data recording.)     (The indication for this sleep study was reviewed and deemed appropriate by AASM Practice Parameters or other reasons by a Board Certified Sleep Specialist.)  _________________         Sleep architecture: This is a baseline polysomnogram. At lights out, the patient fell asleep in 0.5 minutes and slept for 93.3% of the time. Total sleep time (TST) was 341.0 minutes. 6.3% of TST was in Stage N1 sleep, 17.0% TST in slow wave sleep, and 20.2% TST in REM sleep. The REM latency was 123.5 minutes. Sleep architecture was significantly disrupted due to underlying sleep apnea.   Respiratory: Mild snoring was present. The overall AHI was 9.9 with an oxygen rodo of 85.0%. The supine AHI was 41.3 and the REM AHI was 40.9. The patient did not qualify for a split night study due to an insufficient number of events in the first half of the study. RDI (Respiratory Disturbance Index) was 14.6.   Motor movement / Parasomnia: There were no significant limb movements of sleep noted. The total limb movement index was 34.1 (3.0with arousal).   Cardiac: Cardiac rhythm monitoring revealed paced rhythm with occasional PVC's.   Patient perception: On a post-sleep study questionnaire, the patient indicated that sleep was the same in the lab than compared to home.   IMPRESSION:   1. Obstructive Sleep Apnea (G47.33), mild based on AHI criteria.     RECOMMENDATION:   1. CPAP titration study, if the patient is interested in this treatment modality.   2. Alternative treatment options (oral appliance, EPAP, ENT surgery) may be discussed with the patient.     "

## 2020-08-25 ENCOUNTER — PATIENT OUTREACH (OUTPATIENT)
Dept: OTHER | Facility: OTHER | Age: 55
End: 2020-08-25

## 2020-08-26 ENCOUNTER — TELEPHONE (OUTPATIENT)
Dept: ELECTROPHYSIOLOGY | Facility: CLINIC | Age: 55
End: 2020-08-26

## 2020-08-26 DIAGNOSIS — G43.709 CHRONIC MIGRAINE WITHOUT AURA WITHOUT STATUS MIGRAINOSUS, NOT INTRACTABLE: Primary | ICD-10-CM

## 2020-08-26 NOTE — TELEPHONE ENCOUNTER
----- Message from Qi Lama sent at 8/26/2020  8:52 AM CDT -----  Regarding: Appt  Pt calling to see when will she be schedule for replacement of her defibrillator. Thanks     [phone]

## 2020-08-26 NOTE — TELEPHONE ENCOUNTER
/Spoke to Ms. Chawla.  LV lead revision scheduled for 10/9/2020 with arrival time of 10:00 am.  We reviewed the following together via the phone:    -Elective procedure COVID 19 Testing scheduled for 10/6/2020 0830 on 2nd Floor Clinic.   -Pre-procedure labs will be drawn 10/2/2020 0830 at our 2nd Floor lab.  -NPO after midnight night prior to procedure.  -High importance of HOLDING Xarelto 2 days prior to procedure with last dose being taken on 10/6/2020.  -Advised to not take Metformin on morning of procedure.    -Reviewed Contrast Prep Instructions of taking Benadryl 50 mg, Tagamet 300 mg, and Prednisone 50 mg 13 hrs, 7 hrs, and 1 hr prior to procedure (will taken 10/8/2020 at 11 pm, 10/9/2020 at 5 am and 11 am.   -Full set of written instructions will be sent upon completion via portal and mail.     Ms. Chawla verbalizes understanding of above and will call our office with any questions or concerns.

## 2020-08-29 ENCOUNTER — CLINICAL SUPPORT (OUTPATIENT)
Dept: CARDIOLOGY | Facility: HOSPITAL | Age: 55
End: 2020-08-29
Payer: MEDICARE

## 2020-08-29 DIAGNOSIS — Z95.810 PRESENCE OF AUTOMATIC (IMPLANTABLE) CARDIAC DEFIBRILLATOR: ICD-10-CM

## 2020-08-29 PROCEDURE — 93296 REM INTERROG EVL PM/IDS: CPT | Performed by: INTERNAL MEDICINE

## 2020-08-29 PROCEDURE — 93295 CARDIAC DEVICE CHECK - REMOTE: ICD-10-PCS | Mod: ,,, | Performed by: INTERNAL MEDICINE

## 2020-08-29 PROCEDURE — 93295 DEV INTERROG REMOTE 1/2/MLT: CPT | Mod: ,,, | Performed by: INTERNAL MEDICINE

## 2020-08-31 ENCOUNTER — EXTERNAL CHRONIC CARE MANAGEMENT (OUTPATIENT)
Dept: PRIMARY CARE CLINIC | Facility: CLINIC | Age: 55
End: 2020-08-31
Payer: MEDICARE

## 2020-08-31 PROCEDURE — 99490 CHRNC CARE MGMT STAFF 1ST 20: CPT | Mod: PBBFAC | Performed by: INTERNAL MEDICINE

## 2020-08-31 PROCEDURE — 99457 RPM TX MGMT 1ST 20 MIN: CPT | Mod: S$PBB,,, | Performed by: INTERNAL MEDICINE

## 2020-08-31 PROCEDURE — 99490 CHRNC CARE MGMT STAFF 1ST 20: CPT | Mod: S$PBB,,, | Performed by: INTERNAL MEDICINE

## 2020-08-31 PROCEDURE — 99457 PR MONITORING, PHYSIOL PARAM, REMOTE, 1ST 20 MINS, PER MONTH: ICD-10-PCS | Mod: S$PBB,,, | Performed by: INTERNAL MEDICINE

## 2020-08-31 PROCEDURE — 99490 PR CHRONIC CARE MGMT, 1ST 20 MIN: ICD-10-PCS | Mod: S$PBB,,, | Performed by: INTERNAL MEDICINE

## 2020-09-01 ENCOUNTER — PATIENT OUTREACH (OUTPATIENT)
Dept: ADMINISTRATIVE | Facility: OTHER | Age: 55
End: 2020-09-01

## 2020-09-03 ENCOUNTER — TELEPHONE (OUTPATIENT)
Dept: NEUROLOGY | Facility: CLINIC | Age: 55
End: 2020-09-03

## 2020-09-03 NOTE — TELEPHONE ENCOUNTER
----- Message from Ellen Milligan sent at 9/3/2020 10:04 AM CDT -----  Regarding: self  Pt is asking for a call back. Contact info [phone]

## 2020-09-03 NOTE — TELEPHONE ENCOUNTER
----- Message from Maggie Parra sent at 9/3/2020  9:20 AM CDT -----  Regarding: PT  Contact: PT  PT called to speak to Jacquie or Laina about her appointment today. Please call back     Callback: [phone]

## 2020-09-03 NOTE — TELEPHONE ENCOUNTER
----- Message from London Lee sent at 9/3/2020 11:05 AM CDT -----  Contact: pt @ [phone]  Pt asking to speak w/ Erica, wants to confirm if she cancels botox appt today, when would be the next available

## 2020-09-08 NOTE — PROGRESS NOTES
Digital [facility]: Health  Follow-Up    The history is provided by the patient.                     Topics Covered on Call: Diet and technique    Additional Follow-up details: She reports that the cuff was not inflating properly yesterday because the cuff wasn't fully plugged into the base. She states she was getting frustrated with it not working properly which likely elevated readings. She states the cuff inflation is not an ongoing issue and she plans to take another reading later today.    Her daughters or sister normally help her take her blood pressure readings, as she needs help getting the cuff on her arm. She states that when no one is available, she puts the cuff on her arm below her elbow. Instructed patient not to take readings with cuff below elbow. Encouraged her to wait until a family member can help her put the cuff on correctly. She confirms that the cuff is the correct size.    She reports increased forgetfulness. She states that if she needs to tell someone something and she doesn't write it down, or she calls and they don't answer the phone, she forgets what she wanted to say. She also reports walking into a room and forgetting why she was there. She would like to be seen regarding these issues. She has seen Dr. Paredes and Dr. Tian in the past for memory issues. She states she will message Dr. Paredes about an appointment after we speak today.     She does report taking readings after her blood pressure medications. I encouraged her to rest prior to readings and try to take readings more regularly.         Diet-no change to diet  24 hour dietary recall  No change to diet.  Breakfast is typically between. Coffee. She typically does not eat breakfast.  Lunch is typically between. Something small, today she had a piece of chicken.   Dinner is typically between. Turkey burger, meat and rice or pasta and vegetables.   Patient reports eating or drinking the following: Her daughter or sister  cook for her. She states they do not add salt in cooking.      Physical Activity-Not assessed    Medication Adherence-Medication Adherence not addressed.      Substance, Sleep, Stress-Not assessed      Additional monitoring needed.  Continue current diet/physical activity routine. Will discuss further at next encounter.  Provided patient education.  Reviewed Device Techniques.     Addressed any questions or concerns and patient has my contact information if needed prior to next outreach. Patient verbalizes understanding.      Explained the importance of self-monitoring and medication adherence. Encouraged the patient to communicate with their health  for lifestyle modifications to help improve or maintain a healthy lifestyle.            There are no preventive care reminders to display for this patient.    Last 5 Patient Entered Readings                                      Current 30 Day Average: 161/92     Recent Readings 9/7/2020 9/7/2020 9/7/2020 9/7/2020 9/5/2020    SBP (mmHg) 159 159 168 168 163    DBP (mmHg) 98 98 96 96 83    Pulse 73 73 74 74 75

## 2020-09-09 ENCOUNTER — PATIENT MESSAGE (OUTPATIENT)
Dept: ELECTROPHYSIOLOGY | Facility: CLINIC | Age: 55
End: 2020-09-09

## 2020-09-10 ENCOUNTER — TELEPHONE (OUTPATIENT)
Dept: PHARMACY | Facility: CLINIC | Age: 55
End: 2020-09-10

## 2020-09-10 ENCOUNTER — TELEPHONE (OUTPATIENT)
Dept: NEUROLOGY | Facility: CLINIC | Age: 55
End: 2020-09-10

## 2020-09-10 NOTE — TELEPHONE ENCOUNTER
----- Message from London Lee sent at 9/10/2020  8:56 AM CDT -----  Contact: pt @ [phone]  Pt calling to schedule an appt w/ the doctor for memory. Pt last seen 2018.

## 2020-09-11 ENCOUNTER — TELEPHONE (OUTPATIENT)
Dept: ELECTROPHYSIOLOGY | Facility: CLINIC | Age: 55
End: 2020-09-11

## 2020-09-11 DIAGNOSIS — T82.110D PACEMAKER LEAD MALFUNCTION, SUBSEQUENT ENCOUNTER: Primary | ICD-10-CM

## 2020-09-11 DIAGNOSIS — Z95.810 PRESENCE OF AUTOMATIC (IMPLANTABLE) CARDIAC DEFIBRILLATOR: ICD-10-CM

## 2020-09-11 DIAGNOSIS — Z53.39 OTHER SPECIFIED PROCEDURE CONVERTED TO OPEN PROCEDURE: ICD-10-CM

## 2020-09-11 DIAGNOSIS — Z91.041 CONTRAST MEDIA ALLERGY: Primary | ICD-10-CM

## 2020-09-11 DIAGNOSIS — Z01.812 PRE-PROCEDURE LAB EXAM: ICD-10-CM

## 2020-09-12 NOTE — TELEPHONE ENCOUNTER
Ms. Denton Howard is scheduled for her Bio LV lead revision with Dr. Mejia on 10/9/2020.    Thanks,    Kell

## 2020-09-14 ENCOUNTER — HOSPITAL ENCOUNTER (OUTPATIENT)
Facility: HOSPITAL | Age: 55
Discharge: HOME | End: 2020-09-15
Attending: EMERGENCY MEDICINE | Admitting: HOSPITALIST
Payer: MEDICARE

## 2020-09-14 ENCOUNTER — TELEPHONE (OUTPATIENT)
Dept: ELECTROPHYSIOLOGY | Facility: CLINIC | Age: 55
End: 2020-09-14

## 2020-09-14 ENCOUNTER — TELEPHONE (OUTPATIENT)
Dept: CARDIOLOGY | Facility: CLINIC | Age: 55
End: 2020-09-14

## 2020-09-14 DIAGNOSIS — R07.9 CHEST PAIN, UNSPECIFIED TYPE: ICD-10-CM

## 2020-09-14 DIAGNOSIS — I44.2 COMPLETE AV BLOCK: ICD-10-CM

## 2020-09-14 DIAGNOSIS — R07.9 CHEST PAIN: Primary | ICD-10-CM

## 2020-09-14 PROBLEM — Z86.79 HISTORY OF ATRIAL FIBRILLATION: Status: ACTIVE | Noted: 2020-09-14

## 2020-09-14 LAB
ABO + RH BLD: NORMAL
ALBUMIN SERPL BCP-MCNC: 3.1 G/DL (ref 3.5–5.2)
ALP SERPL-CCNC: 59 U/L (ref 55–135)
ALT SERPL W/O P-5'-P-CCNC: 19 U/L (ref 10–44)
ANION GAP SERPL CALC-SCNC: 5 MMOL/L (ref 8–16)
AST SERPL-CCNC: 16 U/L (ref 10–40)
BASOPHILS # BLD AUTO: 0.01 K/UL (ref 0–0.2)
BASOPHILS NFR BLD: 0.3 % (ref 0–1.9)
BILIRUB SERPL-MCNC: 0.3 MG/DL (ref 0.1–1)
BILIRUB UR QL STRIP: NEGATIVE
BLD GP AB SCN CELLS X3 SERPL QL: NORMAL
BUN SERPL-MCNC: 11 MG/DL (ref 6–20)
CALCIUM SERPL-MCNC: 8.6 MG/DL (ref 8.7–10.5)
CHLORIDE SERPL-SCNC: 106 MMOL/L (ref 95–110)
CLARITY UR REFRACT.AUTO: CLEAR
CO2 SERPL-SCNC: 26 MMOL/L (ref 23–29)
COLOR UR AUTO: YELLOW
CREAT SERPL-MCNC: 0.9 MG/DL (ref 0.5–1.4)
DIFFERENTIAL METHOD: ABNORMAL
EOSINOPHIL # BLD AUTO: 0 K/UL (ref 0–0.5)
EOSINOPHIL NFR BLD: 0.3 % (ref 0–8)
ERYTHROCYTE [DISTWIDTH] IN BLOOD BY AUTOMATED COUNT: 16.7 % (ref 11.5–14.5)
EST. GFR  (AFRICAN AMERICAN): >60 ML/MIN/1.73 M^2
EST. GFR  (NON AFRICAN AMERICAN): >60 ML/MIN/1.73 M^2
GLUCOSE SERPL-MCNC: 95 MG/DL (ref 70–110)
GLUCOSE UR QL STRIP: NEGATIVE
HCT VFR BLD AUTO: 34.1 % (ref 37–48.5)
HGB BLD-MCNC: 10.7 G/DL (ref 12–16)
HGB UR QL STRIP: NEGATIVE
IMM GRANULOCYTES # BLD AUTO: 0.01 K/UL (ref 0–0.04)
IMM GRANULOCYTES NFR BLD AUTO: 0.3 % (ref 0–0.5)
INR PPP: 0.9 (ref 0.8–1.2)
KETONES UR QL STRIP: NEGATIVE
LEUKOCYTE ESTERASE UR QL STRIP: NEGATIVE
LYMPHOCYTES # BLD AUTO: 1.5 K/UL (ref 1–4.8)
LYMPHOCYTES NFR BLD: 42 % (ref 18–48)
MCH RBC QN AUTO: 29.6 PG (ref 27–31)
MCHC RBC AUTO-ENTMCNC: 31.4 G/DL (ref 32–36)
MCV RBC AUTO: 94 FL (ref 82–98)
MONOCYTES # BLD AUTO: 0.5 K/UL (ref 0.3–1)
MONOCYTES NFR BLD: 14.6 % (ref 4–15)
NEUTROPHILS # BLD AUTO: 1.5 K/UL (ref 1.8–7.7)
NEUTROPHILS NFR BLD: 42.5 % (ref 38–73)
NITRITE UR QL STRIP: NEGATIVE
NRBC BLD-RTO: 0 /100 WBC
PH UR STRIP: 6 [PH] (ref 5–8)
PLATELET # BLD AUTO: 210 K/UL (ref 150–350)
PMV BLD AUTO: 11.3 FL (ref 9.2–12.9)
POTASSIUM SERPL-SCNC: 4.4 MMOL/L (ref 3.5–5.1)
PROT SERPL-MCNC: 9.5 G/DL (ref 6–8.4)
PROT UR QL STRIP: NEGATIVE
PROTHROMBIN TIME: 10.5 SEC (ref 9–12.5)
RBC # BLD AUTO: 3.62 M/UL (ref 4–5.4)
SARS-COV-2 RDRP RESP QL NAA+PROBE: NEGATIVE
SODIUM SERPL-SCNC: 137 MMOL/L (ref 136–145)
SP GR UR STRIP: 1.01 (ref 1–1.03)
TROPONIN I SERPL DL<=0.01 NG/ML-MCNC: <0.006 NG/ML (ref 0–0.03)
URN SPEC COLLECT METH UR: NORMAL
WBC # BLD AUTO: 3.62 K/UL (ref 3.9–12.7)

## 2020-09-14 PROCEDURE — 25000003 PHARM REV CODE 250: Performed by: STUDENT IN AN ORGANIZED HEALTH CARE EDUCATION/TRAINING PROGRAM

## 2020-09-14 PROCEDURE — 84484 ASSAY OF TROPONIN QUANT: CPT

## 2020-09-14 PROCEDURE — 80053 COMPREHEN METABOLIC PANEL: CPT

## 2020-09-14 PROCEDURE — 85610 PROTHROMBIN TIME: CPT

## 2020-09-14 PROCEDURE — 99285 EMERGENCY DEPT VISIT HI MDM: CPT | Mod: GC,,, | Performed by: EMERGENCY MEDICINE

## 2020-09-14 PROCEDURE — 63600175 PHARM REV CODE 636 W HCPCS: Performed by: STUDENT IN AN ORGANIZED HEALTH CARE EDUCATION/TRAINING PROGRAM

## 2020-09-14 PROCEDURE — 99285 EMERGENCY DEPT VISIT HI MDM: CPT | Mod: 25

## 2020-09-14 PROCEDURE — 93005 ELECTROCARDIOGRAM TRACING: CPT

## 2020-09-14 PROCEDURE — 99220 PR INITIAL OBSERVATION CARE,LEVL III: ICD-10-PCS | Mod: ,,, | Performed by: HOSPITALIST

## 2020-09-14 PROCEDURE — G0378 HOSPITAL OBSERVATION PER HR: HCPCS

## 2020-09-14 PROCEDURE — U0002 COVID-19 LAB TEST NON-CDC: HCPCS

## 2020-09-14 PROCEDURE — 93010 EKG 12-LEAD: ICD-10-PCS | Mod: ,,, | Performed by: INTERNAL MEDICINE

## 2020-09-14 PROCEDURE — 96374 THER/PROPH/DIAG INJ IV PUSH: CPT

## 2020-09-14 PROCEDURE — 86901 BLOOD TYPING SEROLOGIC RH(D): CPT

## 2020-09-14 PROCEDURE — 99284 EMERGENCY DEPT VISIT MOD MDM: CPT | Mod: GC,,, | Performed by: INTERNAL MEDICINE

## 2020-09-14 PROCEDURE — 25000003 PHARM REV CODE 250: Performed by: HOSPITALIST

## 2020-09-14 PROCEDURE — 99284 PR EMERGENCY DEPT VISIT,LEVEL IV: ICD-10-PCS | Mod: GC,,, | Performed by: INTERNAL MEDICINE

## 2020-09-14 PROCEDURE — 85025 COMPLETE CBC W/AUTO DIFF WBC: CPT

## 2020-09-14 PROCEDURE — 93010 ELECTROCARDIOGRAM REPORT: CPT | Mod: ,,, | Performed by: INTERNAL MEDICINE

## 2020-09-14 PROCEDURE — 99220 PR INITIAL OBSERVATION CARE,LEVL III: CPT | Mod: ,,, | Performed by: HOSPITALIST

## 2020-09-14 PROCEDURE — 81003 URINALYSIS AUTO W/O SCOPE: CPT

## 2020-09-14 PROCEDURE — 25000242 PHARM REV CODE 250 ALT 637 W/ HCPCS: Performed by: STUDENT IN AN ORGANIZED HEALTH CARE EDUCATION/TRAINING PROGRAM

## 2020-09-14 PROCEDURE — 99285 PR EMERGENCY DEPT VISIT,LEVEL V: ICD-10-PCS | Mod: GC,,, | Performed by: EMERGENCY MEDICINE

## 2020-09-14 RX ORDER — LOSARTAN POTASSIUM 50 MG/1
100 TABLET ORAL DAILY
Status: DISCONTINUED | OUTPATIENT
Start: 2020-09-15 | End: 2020-09-15 | Stop reason: HOSPADM

## 2020-09-14 RX ORDER — PROCHLORPERAZINE EDISYLATE 5 MG/ML
5 INJECTION INTRAMUSCULAR; INTRAVENOUS EVERY 6 HOURS PRN
Status: DISCONTINUED | OUTPATIENT
Start: 2020-09-14 | End: 2020-09-15 | Stop reason: HOSPADM

## 2020-09-14 RX ORDER — HYDROXYZINE PAMOATE 25 MG/1
50 CAPSULE ORAL
Status: COMPLETED | OUTPATIENT
Start: 2020-09-14 | End: 2020-09-14

## 2020-09-14 RX ORDER — CLOPIDOGREL BISULFATE 75 MG/1
75 TABLET ORAL DAILY
Status: DISCONTINUED | OUTPATIENT
Start: 2020-09-15 | End: 2020-09-15 | Stop reason: HOSPADM

## 2020-09-14 RX ORDER — ONDANSETRON 2 MG/ML
4 INJECTION INTRAMUSCULAR; INTRAVENOUS EVERY 8 HOURS PRN
Status: DISCONTINUED | OUTPATIENT
Start: 2020-09-14 | End: 2020-09-15 | Stop reason: HOSPADM

## 2020-09-14 RX ORDER — ROSUVASTATIN CALCIUM 20 MG/1
40 TABLET, COATED ORAL NIGHTLY
Status: DISCONTINUED | OUTPATIENT
Start: 2020-09-14 | End: 2020-09-15 | Stop reason: HOSPADM

## 2020-09-14 RX ORDER — TIAGABINE HYDROCHLORIDE 4 MG/1
4 TABLET, FILM COATED ORAL NIGHTLY
Status: DISCONTINUED | OUTPATIENT
Start: 2020-09-14 | End: 2020-09-15 | Stop reason: HOSPADM

## 2020-09-14 RX ORDER — IPRATROPIUM BROMIDE AND ALBUTEROL SULFATE 2.5; .5 MG/3ML; MG/3ML
3 SOLUTION RESPIRATORY (INHALATION) EVERY 6 HOURS PRN
Status: DISCONTINUED | OUTPATIENT
Start: 2020-09-14 | End: 2020-09-15 | Stop reason: HOSPADM

## 2020-09-14 RX ORDER — IBUPROFEN 200 MG
16 TABLET ORAL
Status: DISCONTINUED | OUTPATIENT
Start: 2020-09-14 | End: 2020-09-15 | Stop reason: HOSPADM

## 2020-09-14 RX ORDER — PANTOPRAZOLE SODIUM 40 MG/1
40 TABLET, DELAYED RELEASE ORAL DAILY
Status: DISCONTINUED | OUTPATIENT
Start: 2020-09-15 | End: 2020-09-15 | Stop reason: HOSPADM

## 2020-09-14 RX ORDER — DULOXETIN HYDROCHLORIDE 30 MG/1
60 CAPSULE, DELAYED RELEASE ORAL 2 TIMES DAILY
Status: DISCONTINUED | OUTPATIENT
Start: 2020-09-14 | End: 2020-09-15 | Stop reason: HOSPADM

## 2020-09-14 RX ORDER — PREDNISONE 50 MG/1
TABLET ORAL
Qty: 3 TABLET | Refills: 0 | Status: SHIPPED | OUTPATIENT
Start: 2020-10-08 | End: 2020-09-14 | Stop reason: CLARIF

## 2020-09-14 RX ORDER — IBUPROFEN 200 MG
24 TABLET ORAL
Status: DISCONTINUED | OUTPATIENT
Start: 2020-09-14 | End: 2020-09-15 | Stop reason: HOSPADM

## 2020-09-14 RX ORDER — TALC
6 POWDER (GRAM) TOPICAL NIGHTLY PRN
Status: DISCONTINUED | OUTPATIENT
Start: 2020-09-14 | End: 2020-09-14

## 2020-09-14 RX ORDER — DIPHENHYDRAMINE HCL 25 MG
25 CAPSULE ORAL ONCE AS NEEDED
Status: DISCONTINUED | OUTPATIENT
Start: 2020-09-14 | End: 2020-09-15 | Stop reason: HOSPADM

## 2020-09-14 RX ORDER — NITROGLYCERIN 0.4 MG/1
0.4 TABLET SUBLINGUAL EVERY 5 MIN PRN
Status: DISCONTINUED | OUTPATIENT
Start: 2020-09-14 | End: 2020-09-14

## 2020-09-14 RX ORDER — MORPHINE SULFATE 4 MG/ML
4 INJECTION, SOLUTION INTRAMUSCULAR; INTRAVENOUS
Status: COMPLETED | OUTPATIENT
Start: 2020-09-14 | End: 2020-09-14

## 2020-09-14 RX ORDER — CIMETIDINE 300 MG/1
TABLET, FILM COATED ORAL
Qty: 3 TABLET | Refills: 0 | Status: SHIPPED | OUTPATIENT
Start: 2020-10-08 | End: 2021-01-21

## 2020-09-14 RX ORDER — SODIUM CHLORIDE 0.9 % (FLUSH) 0.9 %
10 SYRINGE (ML) INJECTION
Status: DISCONTINUED | OUTPATIENT
Start: 2020-09-14 | End: 2020-09-15 | Stop reason: HOSPADM

## 2020-09-14 RX ORDER — CLONAZEPAM 0.5 MG/1
1 TABLET ORAL DAILY PRN
Status: DISCONTINUED | OUTPATIENT
Start: 2020-09-14 | End: 2020-09-15 | Stop reason: HOSPADM

## 2020-09-14 RX ORDER — ACETAMINOPHEN 325 MG/1
650 TABLET ORAL EVERY 4 HOURS PRN
Status: DISCONTINUED | OUTPATIENT
Start: 2020-09-14 | End: 2020-09-15 | Stop reason: HOSPADM

## 2020-09-14 RX ORDER — NITROGLYCERIN 0.4 MG/1
0.4 TABLET SUBLINGUAL
Status: ACTIVE | OUTPATIENT
Start: 2020-09-14 | End: 2020-09-14

## 2020-09-14 RX ORDER — ENOXAPARIN SODIUM 100 MG/ML
40 INJECTION SUBCUTANEOUS EVERY 24 HOURS
Status: DISCONTINUED | OUTPATIENT
Start: 2020-09-14 | End: 2020-09-14

## 2020-09-14 RX ORDER — GABAPENTIN 300 MG/1
900 CAPSULE ORAL 3 TIMES DAILY
Status: DISCONTINUED | OUTPATIENT
Start: 2020-09-14 | End: 2020-09-15 | Stop reason: HOSPADM

## 2020-09-14 RX ORDER — ZOLPIDEM TARTRATE 5 MG/1
10 TABLET ORAL NIGHTLY PRN
Status: DISCONTINUED | OUTPATIENT
Start: 2020-09-14 | End: 2020-09-15 | Stop reason: HOSPADM

## 2020-09-14 RX ORDER — DIPHENHYDRAMINE HCL 25 MG
50 CAPSULE ORAL
Qty: 6 CAPSULE | Refills: 0 | COMMUNITY
Start: 2020-10-08 | End: 2021-01-21

## 2020-09-14 RX ORDER — ARIPIPRAZOLE 5 MG/1
15 TABLET ORAL DAILY
Status: DISCONTINUED | OUTPATIENT
Start: 2020-09-15 | End: 2020-09-15 | Stop reason: HOSPADM

## 2020-09-14 RX ORDER — CARVEDILOL 25 MG/1
25 TABLET ORAL 2 TIMES DAILY WITH MEALS
Status: DISCONTINUED | OUTPATIENT
Start: 2020-09-14 | End: 2020-09-15 | Stop reason: HOSPADM

## 2020-09-14 RX ORDER — FUROSEMIDE 20 MG/1
20 TABLET ORAL DAILY
Status: DISCONTINUED | OUTPATIENT
Start: 2020-09-15 | End: 2020-09-15 | Stop reason: HOSPADM

## 2020-09-14 RX ADMIN — NITROGLYCERIN 0.4 MG: 0.4 TABLET, ORALLY DISINTEGRATING SUBLINGUAL at 02:09

## 2020-09-14 RX ADMIN — HYDROXYZINE PAMOATE 50 MG: 25 CAPSULE ORAL at 03:09

## 2020-09-14 RX ADMIN — DULOXETINE HYDROCHLORIDE 60 MG: 30 CAPSULE, DELAYED RELEASE ORAL at 10:09

## 2020-09-14 RX ADMIN — ZOLPIDEM TARTRATE 10 MG: 5 TABLET ORAL at 11:09

## 2020-09-14 RX ADMIN — MORPHINE SULFATE 4 MG: 4 INJECTION INTRAVENOUS at 03:09

## 2020-09-14 RX ADMIN — GABAPENTIN 900 MG: 300 CAPSULE ORAL at 10:09

## 2020-09-14 RX ADMIN — CARVEDILOL 25 MG: 25 TABLET, FILM COATED ORAL at 07:09

## 2020-09-14 RX ADMIN — ROSUVASTATIN CALCIUM 40 MG: 20 TABLET, FILM COATED ORAL at 10:09

## 2020-09-14 RX ADMIN — RIVAROXABAN 20 MG: 20 TABLET, FILM COATED ORAL at 11:09

## 2020-09-14 NOTE — TELEPHONE ENCOUNTER
Spoke to Silverio who reports Ms. Woo arrived in the ER for evaluation about 30 minutes ago.  Advised with 10/10 chest pain, Ms. Woo is exactly where she needs to be for evaluation.  Advised if there is anything needed, from an arrhythmia perspective, the ER will reach out to our team.  Silverio verbalizes understanding and appreciates call.
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
## 2020-09-14 NOTE — TELEPHONE ENCOUNTER
"Pt calling reporting acute chest pain started this morning and has not resolved, reports is midsternal and radiates to her back, does not radiate to jaw or arm, described as "tightness", denies associated symptoms of nausea, diaphoresis, SOB, palpitations, or indigestion, denied worsening with deep respiration. Pt describes as severe 10/10 on pain scale and occurs with any movement. Advised to be evaluated in ER. Patient verbalized understanding. Pt would like arrhythmia to be made aware since is having upcoming procedure. Message sent to Dr. Mejia' staff as FYI.   "

## 2020-09-14 NOTE — TELEPHONE ENCOUNTER
----- Message from Vishal Moy MA sent at 9/14/2020  2:31 PM CDT -----  Regarding: FW: pt in er    ----- Message -----  From: Susan Wilson  Sent: 9/14/2020   2:29 PM CDT  To: [facility] Staff  Subject: pt in er                                         Pls call pt's daughter Silverio [phone].  Pt was brought to the ER via EMS with chest pain and her daughter wants Dr. Mejia to go down and see her.    Thank you
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
## 2020-09-15 VITALS
TEMPERATURE: 97 F | HEART RATE: 67 BPM | BODY MASS INDEX: 50.02 KG/M2 | WEIGHT: 293 LBS | OXYGEN SATURATION: 95 % | RESPIRATION RATE: 16 BRPM | HEIGHT: 64 IN | SYSTOLIC BLOOD PRESSURE: 148 MMHG | DIASTOLIC BLOOD PRESSURE: 77 MMHG

## 2020-09-15 LAB
ANION GAP SERPL CALC-SCNC: 6 MMOL/L (ref 8–16)
AV INDEX (PROSTH): 0.7
AV MEAN GRADIENT: 4 MMHG
AV PEAK GRADIENT: 7 MMHG
AV VALVE AREA: 3.2 CM2
AV VELOCITY RATIO: 0.67
BASOPHILS # BLD AUTO: 0.01 K/UL (ref 0–0.2)
BASOPHILS NFR BLD: 0.3 % (ref 0–1.9)
BSA FOR ECHO PROCEDURE: 2.52 M2
BUN SERPL-MCNC: 13 MG/DL (ref 6–20)
CALCIUM SERPL-MCNC: 8.6 MG/DL (ref 8.7–10.5)
CFR FLOW - ANTERIOR: 1.16
CFR FLOW - INFERIOR: 1.14
CFR FLOW - LATERAL: 1.08
CFR FLOW - MAX: 1.56
CFR FLOW - MIN: 0.7
CFR FLOW - SEPTAL: 1.23
CFR FLOW - WHOLE HEART: 1.15
CHLORIDE SERPL-SCNC: 106 MMOL/L (ref 95–110)
CO2 SERPL-SCNC: 26 MMOL/L (ref 23–29)
CREAT SERPL-MCNC: 1 MG/DL (ref 0.5–1.4)
CV ECHO LV RWT: 0.45 CM
CV PHARM DOSE: 60 MG
CV STRESS BASE HR: 60 BPM
DIASTOLIC BLOOD PRESSURE: 72 MMHG
DIFFERENTIAL METHOD: ABNORMAL
DOP CALC AO PEAK VEL: 1.36 M/S
DOP CALC AO VTI: 28.74 CM
DOP CALC LVOT AREA: 4.6 CM2
DOP CALC LVOT DIAMETER: 2.42 CM
DOP CALC LVOT PEAK VEL: 0.91 M/S
DOP CALC LVOT STROKE VOLUME: 92.08 CM3
DOP CALCLVOT PEAK VEL VTI: 20.03 CM
E WAVE DECELERATION TIME: 220.45 MSEC
E/A RATIO: 0.69
E/E' RATIO: 9.43 M/S
ECHO LV POSTERIOR WALL: 1.12 CM (ref 0.6–1.1)
END DIASTOLIC INDEX-HIGH: 170 ML/M2
END SYSTOLIC INDEX-HIGH: 70 ML/M2
EOSINOPHIL # BLD AUTO: 0 K/UL (ref 0–0.5)
EOSINOPHIL NFR BLD: 0.6 % (ref 0–8)
ERYTHROCYTE [DISTWIDTH] IN BLOOD BY AUTOMATED COUNT: 17.1 % (ref 11.5–14.5)
EST. GFR  (AFRICAN AMERICAN): >60 ML/MIN/1.73 M^2
EST. GFR  (NON AFRICAN AMERICAN): >60 ML/MIN/1.73 M^2
FRACTIONAL SHORTENING: 24 % (ref 28–44)
GLUCOSE SERPL-MCNC: 98 MG/DL (ref 70–110)
HCT VFR BLD AUTO: 32 % (ref 37–48.5)
HGB BLD-MCNC: 10.2 G/DL (ref 12–16)
IMM GRANULOCYTES # BLD AUTO: 0.01 K/UL (ref 0–0.04)
IMM GRANULOCYTES NFR BLD AUTO: 0.3 % (ref 0–0.5)
INTERVENTRICULAR SEPTUM: 0.75 CM (ref 0.6–1.1)
IVRT: 119.89 MSEC
LA MAJOR: 5.42 CM
LA MINOR: 5.99 CM
LA WIDTH: 4.38 CM
LEFT ATRIUM SIZE: 4.07 CM
LEFT ATRIUM VOLUME INDEX: 36.6 ML/M2
LEFT ATRIUM VOLUME: 86.23 CM3
LEFT INTERNAL DIMENSION IN SYSTOLE: 3.79 CM (ref 2.1–4)
LEFT VENTRICLE DIASTOLIC VOLUME INDEX: 49.32 ML/M2
LEFT VENTRICLE DIASTOLIC VOLUME: 116.06 ML
LEFT VENTRICLE MASS INDEX: 70 G/M2
LEFT VENTRICLE SYSTOLIC VOLUME INDEX: 26.1 ML/M2
LEFT VENTRICLE SYSTOLIC VOLUME: 61.48 ML
LEFT VENTRICULAR INTERNAL DIMENSION IN DIASTOLE: 4.96 CM (ref 3.5–6)
LEFT VENTRICULAR MASS: 164.16 G
LV LATERAL E/E' RATIO: 8.25 M/S
LV SEPTAL E/E' RATIO: 11 M/S
LYMPHOCYTES # BLD AUTO: 1.2 K/UL (ref 1–4.8)
LYMPHOCYTES NFR BLD: 33.1 % (ref 18–48)
MCH RBC QN AUTO: 30 PG (ref 27–31)
MCHC RBC AUTO-ENTMCNC: 31.9 G/DL (ref 32–36)
MCV RBC AUTO: 94 FL (ref 82–98)
MONOCYTES # BLD AUTO: 0.5 K/UL (ref 0.3–1)
MONOCYTES NFR BLD: 13.9 % (ref 4–15)
MV PEAK A VEL: 0.96 M/S
MV PEAK E VEL: 0.66 M/S
MV STENOSIS PRESSURE HALF TIME: 63.93 MS
MV VALVE AREA P 1/2 METHOD: 3.44 CM2
NEUTROPHILS # BLD AUTO: 1.8 K/UL (ref 1.8–7.7)
NEUTROPHILS NFR BLD: 51.8 % (ref 38–73)
NRBC BLD-RTO: 0 /100 WBC
OHS CV CPX 85 PERCENT MAX PREDICTED HEART RATE MALE: 135
OHS CV CPX MAX PREDICTED HEART RATE: 158
OHS CV CPX PATIENT IS FEMALE: 1
OHS CV CPX PATIENT IS MALE: 0
OHS CV CPX PEAK DIASTOLIC BLOOD PRESSURE: 84 MMHG
OHS CV CPX PEAK HEAR RATE: 67 BPM
OHS CV CPX PEAK RATE PRESSURE PRODUCT: 9916
OHS CV CPX PEAK SYSTOLIC BLOOD PRESSURE: 148 MMHG
OHS CV CPX PERCENT MAX PREDICTED HEART RATE ACHIEVED: 42
OHS CV CPX RATE PRESSURE PRODUCT PRESENTING: 8640
PISA TR MAX VEL: 2.76 M/S
PLATELET # BLD AUTO: 200 K/UL (ref 150–350)
PMV BLD AUTO: 11.6 FL (ref 9.2–12.9)
POTASSIUM SERPL-SCNC: 4.2 MMOL/L (ref 3.5–5.1)
PULM VEIN S/D RATIO: 1.18
PV PEAK D VEL: 0.28 M/S
PV PEAK S VEL: 0.33 M/S
RA MAJOR: 4.15 CM
RA PRESSURE: 3 MMHG
RA WIDTH: 3.13 CM
RBC # BLD AUTO: 3.4 M/UL (ref 4–5.4)
REST FLOW - ANTERIOR: 0.75 CC/MIN/G
REST FLOW - INFERIOR: 0.84 CC/MIN/G
REST FLOW - LATERAL: 1.03 CC/MIN/G
REST FLOW - MAX: 1.2 CC/MIN/G
REST FLOW - MIN: 0.39 CC/MIN/G
REST FLOW - SEPTAL: 0.65 CC/MIN/G
REST FLOW - WHOLE HEART: 0.82 CC/MIN/G
RETIRED EF AND QEF - SEE NOTES: 51 %
RIGHT VENTRICULAR END-DIASTOLIC DIMENSION: 3.72 CM
RV TISSUE DOPPLER FREE WALL SYSTOLIC VELOCITY 1 (APICAL 4 CHAMBER VIEW): 10.15 CM/S
SINUS: 3.82 CM
SODIUM SERPL-SCNC: 138 MMOL/L (ref 136–145)
STJ: 3.53 CM
STRESS FLOW - ANTERIOR: 0.88 CC/MIN/G
STRESS FLOW - INFERIOR: 0.93 CC/MIN/G
STRESS FLOW - LATERAL: 1.1 CC/MIN/G
STRESS FLOW - MAX: 1.42 CC/MIN/G
STRESS FLOW - MIN: 0.46 CC/MIN/G
STRESS FLOW - SEPTAL: 0.83 CC/MIN/G
STRESS FLOW - WHOLE HEART: 0.94 CC/MIN/G
SYSTOLIC BLOOD PRESSURE: 144 MMHG
TDI LATERAL: 0.08 M/S
TDI SEPTAL: 0.06 M/S
TDI: 0.07 M/S
TR MAX PG: 30 MMHG
TRICUSPID ANNULAR PLANE SYSTOLIC EXCURSION: 2.77 CM
TROPONIN I SERPL DL<=0.01 NG/ML-MCNC: <0.006 NG/ML (ref 0–0.03)
TV REST PULMONARY ARTERY PRESSURE: 33 MMHG
WBC # BLD AUTO: 3.53 K/UL (ref 3.9–12.7)

## 2020-09-15 PROCEDURE — 97166 OT EVAL MOD COMPLEX 45 MIN: CPT

## 2020-09-15 PROCEDURE — 97161 PT EVAL LOW COMPLEX 20 MIN: CPT

## 2020-09-15 PROCEDURE — 85025 COMPLETE CBC W/AUTO DIFF WBC: CPT

## 2020-09-15 PROCEDURE — 99214 PR OFFICE/OUTPT VISIT, EST, LEVL IV, 30-39 MIN: ICD-10-PCS | Mod: GC,,, | Performed by: INTERNAL MEDICINE

## 2020-09-15 PROCEDURE — 80048 BASIC METABOLIC PNL TOTAL CA: CPT

## 2020-09-15 PROCEDURE — 99217 PR OBSERVATION CARE DISCHARGE: CPT | Mod: ,,, | Performed by: PHYSICIAN ASSISTANT

## 2020-09-15 PROCEDURE — 84484 ASSAY OF TROPONIN QUANT: CPT

## 2020-09-15 PROCEDURE — 25000003 PHARM REV CODE 250: Performed by: HOSPITALIST

## 2020-09-15 PROCEDURE — 96375 TX/PRO/DX INJ NEW DRUG ADDON: CPT | Performed by: EMERGENCY MEDICINE

## 2020-09-15 PROCEDURE — 36415 COLL VENOUS BLD VENIPUNCTURE: CPT

## 2020-09-15 PROCEDURE — 99214 OFFICE O/P EST MOD 30 MIN: CPT | Mod: GC,,, | Performed by: INTERNAL MEDICINE

## 2020-09-15 PROCEDURE — 25000003 PHARM REV CODE 250: Performed by: PHYSICIAN ASSISTANT

## 2020-09-15 PROCEDURE — 99217 PR OBSERVATION CARE DISCHARGE: ICD-10-PCS | Mod: ,,, | Performed by: PHYSICIAN ASSISTANT

## 2020-09-15 PROCEDURE — G0378 HOSPITAL OBSERVATION PER HR: HCPCS

## 2020-09-15 RX ORDER — TIZANIDINE 4 MG/1
4 TABLET ORAL EVERY 8 HOURS
Status: DISCONTINUED | OUTPATIENT
Start: 2020-09-15 | End: 2020-09-15 | Stop reason: HOSPADM

## 2020-09-15 RX ORDER — DIPYRIDAMOLE 5 MG/ML
60 INJECTION INTRAVENOUS ONCE
Status: COMPLETED | OUTPATIENT
Start: 2020-09-15 | End: 2020-09-15

## 2020-09-15 RX ORDER — TIZANIDINE 4 MG/1
4 TABLET ORAL EVERY 8 HOURS
Qty: 15 TABLET | Refills: 0 | Status: SHIPPED | OUTPATIENT
Start: 2020-09-15 | End: 2020-09-25

## 2020-09-15 RX ORDER — LIDOCAINE 50 MG/G
1 PATCH TOPICAL
Status: DISCONTINUED | OUTPATIENT
Start: 2020-09-15 | End: 2020-09-15 | Stop reason: HOSPADM

## 2020-09-15 RX ORDER — LIDOCAINE 50 MG/G
1 PATCH TOPICAL DAILY
Qty: 10 PATCH | Refills: 0 | Status: SHIPPED | OUTPATIENT
Start: 2020-09-15 | End: 2021-01-21

## 2020-09-15 RX ADMIN — LIDOCAINE 1 PATCH: 50 PATCH TOPICAL at 01:09

## 2020-09-15 RX ADMIN — CARVEDILOL 25 MG: 25 TABLET, FILM COATED ORAL at 05:09

## 2020-09-15 RX ADMIN — FUROSEMIDE 20 MG: 20 TABLET ORAL at 08:09

## 2020-09-15 RX ADMIN — LOSARTAN POTASSIUM 100 MG: 50 TABLET, FILM COATED ORAL at 08:09

## 2020-09-15 RX ADMIN — RIVAROXABAN 20 MG: 20 TABLET, FILM COATED ORAL at 05:09

## 2020-09-15 RX ADMIN — ARIPIPRAZOLE 15 MG: 5 TABLET ORAL at 08:09

## 2020-09-15 RX ADMIN — DIPYRIDAMOLE 60 MG: 5 INJECTION INTRAVENOUS at 10:09

## 2020-09-15 RX ADMIN — ACETAMINOPHEN 650 MG: 325 TABLET ORAL at 08:09

## 2020-09-15 RX ADMIN — GABAPENTIN 900 MG: 300 CAPSULE ORAL at 08:09

## 2020-09-15 RX ADMIN — CLOPIDOGREL 75 MG: 75 TABLET, FILM COATED ORAL at 08:09

## 2020-09-15 RX ADMIN — CARVEDILOL 25 MG: 25 TABLET, FILM COATED ORAL at 08:09

## 2020-09-15 RX ADMIN — GABAPENTIN 900 MG: 300 CAPSULE ORAL at 03:09

## 2020-09-15 RX ADMIN — DULOXETINE HYDROCHLORIDE 60 MG: 30 CAPSULE, DELAYED RELEASE ORAL at 08:09

## 2020-09-15 RX ADMIN — PANTOPRAZOLE SODIUM 40 MG: 40 TABLET, DELAYED RELEASE ORAL at 08:09

## 2020-09-15 RX ADMIN — TIZANIDINE 4 MG: 4 TABLET ORAL at 01:09

## 2020-09-15 NOTE — SUBJECTIVE & OBJECTIVE
"Interval History: NAEON. Patient seen shortly after PET stress and reports back pain and chest "soreness" but denies any chest pain during the procedure and acute chest pain on admission has now resolved and denies nausea.     Review of Systems   Constitution: Negative for chills, decreased appetite, diaphoresis, fever, malaise/fatigue, night sweats, weight gain and weight loss.        Persistent back pain rated at a 7/10   Eyes: Negative.    Cardiovascular: Negative for chest pain, irregular heartbeat, leg swelling, near-syncope, orthopnea, palpitations, paroxysmal nocturnal dyspnea and syncope.   Respiratory: Negative for cough, shortness of breath, sputum production and wheezing.    Endocrine: Negative.    Hematologic/Lymphatic: Negative for bleeding problem.   Skin: Negative for color change, flushing, rash and suspicious lesions.   Musculoskeletal: Negative.    Gastrointestinal: Negative for bloating, abdominal pain, change in bowel habit, constipation, diarrhea, heartburn, melena, nausea and vomiting.   Genitourinary: Negative for flank pain, frequency, hematuria, incomplete emptying and urgency.   Neurological: Negative for dizziness, focal weakness, headaches, light-headedness, numbness, paresthesias, seizures, sensory change and weakness.   Psychiatric/Behavioral: Negative for altered mental status. The patient is not nervous/anxious.      Objective:     Vital Signs (Most Recent):  Temp: 98.1 °F (36.7 °C) (09/15/20 1138)  Pulse: 62 (09/15/20 1138)  Resp: 16 (09/15/20 1138)  BP: 138/65 (09/15/20 1138)  SpO2: 95 % (09/15/20 1138) Vital Signs (24h Range):  Temp:  [96.7 °F (35.9 °C)-99.1 °F (37.3 °C)] 98.1 °F (36.7 °C)  Pulse:  [60-90] 62  Resp:  [14-19] 16  SpO2:  [95 %-100 %] 95 %  BP: (128-177)/(65-96) 138/65     Weight: (!) 140.2 kg (309 lb)  Body mass index is 53.04 kg/m².     SpO2: 95 %       No intake or output data in the 24 hours ending 09/15/20 1316    Lines/Drains/Airways     Airway                 " Airway - Non-Surgical 08/02/19 0915 Nasal Cannula 410 days          Peripheral Intravenous Line                 Peripheral IV - Single Lumen 09/14/20 18 G Left Antecubital 1 day                Physical Exam   Constitutional: She is oriented to person, place, and time. She appears well-developed and well-nourished. No distress.   HENT:   Head: Normocephalic and atraumatic.   Mouth/Throat: Oropharynx is clear and moist.   Eyes: Pupils are equal, round, and reactive to light. EOM are normal.   Neck: Normal range of motion. Neck supple. No JVD present.   Cardiovascular: Normal rate and regular rhythm. Exam reveals no gallop and no friction rub.   No murmur heard.  Pulmonary/Chest: Effort normal and breath sounds normal. No respiratory distress. She has no wheezes. She has no rales. She exhibits no tenderness.   Abdominal: Soft. Bowel sounds are normal. She exhibits no distension. There is no abdominal tenderness.   Musculoskeletal: Normal range of motion.         General: No edema.   Lymphadenopathy:     She has no cervical adenopathy.   Neurological: She is alert and oriented to person, place, and time.   Skin: Skin is warm and dry. No erythema.   Psychiatric: She has a normal mood and affect. Her behavior is normal. Judgment and thought content normal.       Significant Labs:   CMP   Recent Labs   Lab 09/14/20  1450 09/15/20  0236    138   K 4.4 4.2    106   CO2 26 26   GLU 95 98   BUN 11 13   CREATININE 0.9 1.0   CALCIUM 8.6* 8.6*   PROT 9.5*  --    ALBUMIN 3.1*  --    BILITOT 0.3  --    ALKPHOS 59  --    AST 16  --    ALT 19  --    ANIONGAP 5* 6*   ESTGFRAFRICA >60.0 >60.0   EGFRNONAA >60.0 >60.0   , CBC   Recent Labs   Lab 09/14/20  1450 09/15/20  0237   WBC 3.62* 3.53*   HGB 10.7* 10.2*   HCT 34.1* 32.0*    200    and Troponin   Recent Labs   Lab 09/14/20  1450   TROPONINI <0.006       Significant Imaging: PET stress:  Whole heart absolute myocardial perfusion (cc/min/g) averaged 0.82 cc/min/g  at rest, which is normal, 0.94 cc/min/g at stress, which is moderately reduced, and 1.15  CFR, which is severely reduced.    Visually estimated ejection fraction is normal at rest and normal at stress.    Wall motion was normal at rest and during stress.    LV cavity size is normal at rest and stress.    The EKG portion of this study is uninterpretable.    There were no arrhythmias during stress.    The patient reported no chest pain during the stress test.    When compared to the previous study from 3/29/2017, there are significant changes.

## 2020-09-15 NOTE — PT/OT/SLP EVAL
Physical Therapy Evaluation    Patient Name:  Amna Chawla   MRN:  [id number]    Recommendations:     Discharge Recommendations:  (home no needs)   Discharge Equipment Recommendations: none   Barriers to discharge: None    Assessment:     Amna Chawla is a 55 y.o. female admitted with a medical diagnosis of Chest pain.  She presents with the following impairments/functional limitations:  impaired endurance, gait instability, impaired balance pt ruchi treatment well but was very lethargic during treatment which limited functional mobility. Pt will benefit from 1 or 2 more sessions of PT to address decreased distance ambulated. Pt will be able to discharge home when medically stable and will have no needs.     Rehab Prognosis: Good; patient would benefit from acute skilled PT services to address these deficits and reach maximum level of function.    Recent Surgery: * No surgery found *      Plan:     During this hospitalization, patient to be seen 3 x/week to address the identified rehab impairments via gait training, therapeutic activities and progress toward the following goals:    · Plan of Care Expires:  10/13/20    Subjective     Chief Complaint: pt c/o headache and back ache    Patient/Family Comments/goals: to get better and go home.   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 They live in 1 st  Floor apt with 5 steps and R handrail but ramp entrance.   Prior to admission, patients level of function was Independent .  Equipment used at home: bath bench, rollator.  DME owned (not currently used): none.  Upon discharge, patient will have assistance from sister.    Objective:     Communicated with nurse prior to session.  Patient found supine with telemetry(hep lock IV)  upon PT  entry to room.    General Precautions: Standard, fall   Orthopedic Precautions:    Braces:       Exams:  · Cognitive Exam:  Patient is oriented to Person, Place, Time and Situation  · RLE ROM: WFL  · RLE Strength: WFL  · LLE ROM: WFL  · LLE Strength: WFL    Functional Mobility:  · Bed Mobility:     · Rolling Right: stand by assistance  · Supine to Sit: stand by assistance  ·   · Transfers:     · Sit to Stand:  stand by assistance with hand-held assist  ·   · Gait: pt received gait training ~ 24 ft with CGA. pt was very lethargic and distance gait trained was limited by pt lethargy.     Therapeutic Activities and Exercises:   pt received verbal instructions in role of PT and POC. Pt verbally expressed understanding of such.     AM-PAC 6 CLICK MOBILITY  Total Score:18     Patient left up in chair with all lines intact and call button in reach.    GOALS:   Multidisciplinary Problems     Physical Therapy Goals        Problem: Physical Therapy Goal    Goal Priority Disciplines Outcome Goal Variances Interventions   Physical Therapy Goal     PT, PT/OT Ongoing, Progressing     Description: Goals to be met by: 20    Patient will increase functional independence with mobility by performin. Supine to sit with Modified [place] -not met  2. Sit to stand transfer with Modified [place] -not met  3. Gait  x 250 feet with Supervision -not met  4. Ascend/descend 5 stair with right Handrails Stand-by Assistance -not met                   History:     Past Medical History:   Diagnosis Date    Anticoagulant long-term use     Anxiety     Asthma     Atrial fibrillation     Brain anoxic injury     Cervicalgia 2014    CHI (closed head injury) 2013    Convulsion 2015    Decreased ROM of left shoulder 2017    Defibrillator activation     Depression     Heart block     History of sudden cardiac arrest 2013    PEA arrest with subsequent long-QT    Hx of psychiatric care      Hypertension     Left atrial enlargement 4/11/2018    Pacemaker 2013    Paresthesia 11/1/2013    Prolonged Q-T interval on ECG 2/8/2013    Psychiatric problem     Seizures     Sleep difficulties     Stroke     weakness lt side    Therapy     Thyroid disease     Upper airway resistance syndrome 2/21/2017       Past Surgical History:   Procedure Laterality Date    breast reduction      CARDIAC DEFIBRILLATOR PLACEMENT      CARDIAC DEFIBRILLATOR PLACEMENT      CARPAL TUNNEL RELEASE Right     COLONOSCOPY N/A 5/7/2019    Procedure: COLONOSCOPY;  Surgeon: Juan Coffey MD;  Location: [facility] ([address] [place] [address]);  Service: Endoscopy;  Laterality: N/A;  per DR. Bustamante Pt to have balloon with DR. Coffey due to excesive looping, could not reach cecum - see telephone encounter 3/8/19 and last procedure report dated 6/24/14/ Per Piedad schedule as 90 min case- ERW  pacemaker/AICD Boitronik/   per , ok to hold Xareltox 2 days    HERNIA REPAIR      HIATAL HERNIA REPAIR      INSERT / REPLACE / REMOVE PACEMAKER  10/2017    CRT-D upgrade    TOTAL REDUCTION MAMMOPLASTY      TRIGGER FINGER RELEASE Left 8/2/2019    Procedure: RELEASE, TRIGGER FINGER left middle;  Surgeon: Matt Pugh Jr., MD;  Location: [facility];  Service: Plastics;  Laterality: Left;    TRIGGER FINGER RELEASE Right 2/11/2020    Procedure: RELEASE, TRIGGER FINGER middle;  Surgeon: Matt Pugh Jr., MD;  Location: [facility];  Service: Plastics;  Laterality: Right;    TUBAL LIGATION         Time Tracking:     PT Received On: 09/15/20  PT Start Time: 1246     PT Stop Time: 1257  PT Total Time (min): 11 min     Billable Minutes: Evaluation 11 min      Anna Walton, PT  09/15/2020

## 2020-09-15 NOTE — PT/OT/SLP EVAL
Occupational Therapy   Evaluation and Discharge Note    Name: Amna Chawla  MRN: [id number]  Admitting Diagnosis:  Chest pain      Recommendations:     Discharge Recommendations: home  Discharge Equipment Recommendations:  none  Barriers to discharge:  None    Assessment:     Amna Chawla is a 55 y.o. female with a medical diagnosis of Chest pain. At this time, patient is functioning at their prior level of function and does not require further acute OT services.     Plan:     During this hospitalization, patient does not require further acute OT services.  Please re-consult if situation changes.    · Plan of Care Reviewed with: patient    Subjective     Chief Complaint: Fatigue post procedure.   Patient/Family Comments/goals: Medical management and discharge.     Occupational Profile:  Living Environment: Pt lives with her 12 yo daughter, pt sister and pt niece. They live in 1 st  Floor apt with 5 steps and R handrail but ramp entrance  Previous level of function: Independent w/ ADLs/IADLs  Roles and Routines: Mother,   Equipment Used at home:  bath bench, rollator  Assistance upon Discharge: Yes from family.    Pain/Comfort:  · Pain Rating 1: 8/10(back and head)  · Pain Addressed 1: Reposition, Distraction  · Pain Rating Post-Intervention 1: 8/10    Patients cultural, spiritual, Pentecostalism conflicts given the current situation: no    Objective:     Communicated with: RN prior to session.  Patient found up in chair with telemetry upon OT entry to room.    General Precautions: Standard, fall   Orthopedic Precautions:N/A   Braces: N/A     Occupational Performance:    Bed Mobility:    · Patient completed Rolling/Turning to Right with stand by assistance  · Patient completed Scooting/Bridging with stand by assistance  · Patient completed Supine to Sit with stand by assistance    Functional Mobility/Transfers:  · Patient completed Sit <> Stand Transfer with stand by assistance  with  no assistive device   · Patient  completed Bed <> Chair Transfer using Step Transfer technique with stand by assistance with no assistive device  Functional Mobility:  Pt was able to walk to/from door to room w/ SBA, and no AD      Activities of Daily Living:  · Lower Body Dressing: supervision seated at EOB w/ setup to don hospital socks.     Cognitive/Visual Perceptual:  Cognitive/Psychosocial Skills:     -       Oriented to: Person, Place, Time and Situation   -       Follows Commands/attention:Follows multistep  commands  -       Communication: clear/fluent  -       Memory: No Deficits noted  -       Safety awareness/insight to disability: intact   -       Mood/Affect/Coping skills/emotional control: Appropriate to situation  Visual/Perceptual:     glasses worn during evaluation    Physical Exam:  Balance:    -       seated Fair +, Standing: Fair  Dominant hand:    -       RH  Upper Extremity Range of Motion:     -       Right Upper Extremity: WFL  -       Left Upper Extremity: WFL  Upper Extremity Strength:    -       Right Upper Extremity: WFL  -       Left Upper Extremity: WFL   Strength:    -       Right Upper Extremity: WFL  -       Left Upper Extremity: WFL  Fine Motor Coordination:    -       Intact  Gross motor coordination:   WFL    AMPAC 6 Click ADL:  AMPAC Total Score: 24    Treatment & Education:  -Pt edu on OT role/POC  -Importance of OOB activity with staff assistance  -Safety during functional t/f and mobility  - White board updated  - Multiple self care tasks/functional mobility completed-- assistance level noted above  - All questions/concerns answered within OT scope of practice    Education:    Patient left HOB elevated with all lines intact, call button in reach and RN notified    GOALS:   Multidisciplinary Problems     Occupational Therapy Goals     Not on file          Multidisciplinary Problems (Resolved)        Problem: Occupational Therapy Goal    Goal Priority Disciplines Outcome Interventions   Occupational  Therapy Goal   (Resolved)     OT, PT/OT Met    Description: Eval complete, no OT needs at this time.  Safe to return home when medically clear.  Reorder OT if status deteriorates.                     History:     Past Medical History:   Diagnosis Date    Anticoagulant long-term use     Anxiety     Asthma     Atrial fibrillation     Brain anoxic injury     Cervicalgia 8/28/2014    CHI (closed head injury) 2/19/2013    Convulsion 5/30/2015    Decreased ROM of left shoulder 4/12/2017    Defibrillator activation 2013    Depression     Heart block     History of sudden cardiac arrest 2/2013    PEA arrest with subsequent long-QT    Hx of psychiatric care     Hypertension     Left atrial enlargement 4/11/2018    Pacemaker 2013    Paresthesia 11/1/2013    Prolonged Q-T interval on ECG 2/8/2013    Psychiatric problem     Seizures     Sleep difficulties     Stroke     weakness lt side    Therapy     Thyroid disease     Upper airway resistance syndrome 2/21/2017       Past Surgical History:   Procedure Laterality Date    breast reduction      CARDIAC DEFIBRILLATOR PLACEMENT      CARDIAC DEFIBRILLATOR PLACEMENT      CARPAL TUNNEL RELEASE Right     COLONOSCOPY N/A 5/7/2019    Procedure: COLONOSCOPY;  Surgeon: Juan Coffey MD;  Location: [facility] ([address] [place] [address]);  Service: Endoscopy;  Laterality: N/A;  per DR. Bustamante Pt to have balloon with DR. Coffey due to excesive looping, could not reach cecum - see telephone encounter 3/8/19 and last procedure report dated 6/24/14/ Per Piedad schedule as 90 min case- ERW  pacemaker/AICD Boitronik/   per , ok to hold Xareltox 2 days    HERNIA REPAIR      HIATAL HERNIA REPAIR      INSERT / REPLACE / REMOVE PACEMAKER  10/2017    CRT-D upgrade    TOTAL REDUCTION MAMMOPLASTY      TRIGGER FINGER RELEASE Left 8/2/2019    Procedure: RELEASE, TRIGGER FINGER left middle;  Surgeon: Matt Pugh Jr., MD;  Location: [facility];  Service: Plastics;  Laterality:  Left;    TRIGGER FINGER RELEASE Right 2/11/2020    Procedure: RELEASE, TRIGGER FINGER middle;  Surgeon: Matt Puhg Jr., MD;  Location: [facility];  Service: Plastics;  Laterality: Right;    TUBAL LIGATION         Time Tracking:     OT Date of Treatment: 09/15/20  OT Start Time: 1246  OT Stop Time: 1258  OT Total Time (min): 12 min    Billable Minutes:Evaluation 12 mins    Jhno Vazquez, OT  9/15/2020

## 2020-09-15 NOTE — PLAN OF CARE
Eval complete. Pt tolerated session well. D/C from OT.  Discharge Recommendation: Home  DME rec: None    Problem: Occupational Therapy Goal  Goal: Occupational Therapy Goal  Description: Eval complete, no OT needs at this time.  Safe to return home when medically clear.  Reorder OT if status deteriorates.    Outcome: Met Jhon BAGLEY  9/15/2020

## 2020-09-15 NOTE — PLAN OF CARE
Plan of care and discharge instructions reviewed with patient and daughter. No falls, signs of infection. Discomfort managed with medications. Patient denies any chest pain. VSS

## 2020-09-15 NOTE — ASSESSMENT & PLAN NOTE
56 yo F with complex cardiac hx who presents to ED with severe acute onset chest /back discomfort of unclear etiology. Patient hypertensive on arrival 170s systolic, with symmetrical cuff pressures bilaterally.     CXR negative. EKG unremarkable for acute ischemia but does show . Trop negative x2. Patient has risk factors for ACS including previous arrest, DM, and hyperlipidemia. 2D echo shows low normal EF of 50%. PET stress shows global reduced coronary reserve flow under stress and is normal at rest. This is consistent with stable coronary disease and is likely not amenable to intervention at this time. No other significant findings were noted. Given the stress findings and clinical history, ACS is unlikely and the patient's pain is more consistent with noncardiac chest pain.     Plan:  - BP control < 130/80 mmHg  - Evaluate for noncardiac causes of chest /back pain per Medicine, consider plain films of spine.  - Patient currently on ARB, statin, BB and antiplatelet therapy and is not on ASA due to allergy  - Maintain on telemetry and obtain EKG if any acute changes  - Follow up outpatient in Cardiology clinic       Thank you for your consult. Cardiology will sign off at this point and patient can follow up in clinic. Please contact us if you have any additional questions.

## 2020-09-15 NOTE — PROGRESS NOTES
"[facility]  Cardiology  Progress Note    Patient Name: Amna Chawla  MRN: [id number]  Admission Date: 9/14/2020  Hospital Length of Stay: 0 days  Code Status: Full Code   Attending Physician: Toby Treviño MD   Primary Care Physician: Tiffany Mina MD  Expected Discharge Date: 9/15/2020  Principal Problem:Chest pain    Subjective:     Hospital Course:   No notes on file    Interval History: NAEON. Patient seen shortly after PET stress and reports back pain and chest "soreness" but denies any chest pain during the procedure and acute chest pain on admission has now resolved and denies nausea.     Review of Systems   Constitution: Negative for chills, decreased appetite, diaphoresis, fever, malaise/fatigue, night sweats, weight gain and weight loss.        Persistent back pain rated at a 7/10   Eyes: Negative.    Cardiovascular: Negative for chest pain, irregular heartbeat, leg swelling, near-syncope, orthopnea, palpitations, paroxysmal nocturnal dyspnea and syncope.   Respiratory: Negative for cough, shortness of breath, sputum production and wheezing.    Endocrine: Negative.    Hematologic/Lymphatic: Negative for bleeding problem.   Skin: Negative for color change, flushing, rash and suspicious lesions.   Musculoskeletal: Negative.    Gastrointestinal: Negative for bloating, abdominal pain, change in bowel habit, constipation, diarrhea, heartburn, melena, nausea and vomiting.   Genitourinary: Negative for flank pain, frequency, hematuria, incomplete emptying and urgency.   Neurological: Negative for dizziness, focal weakness, headaches, light-headedness, numbness, paresthesias, seizures, sensory change and weakness.   Psychiatric/Behavioral: Negative for altered mental status. The patient is not nervous/anxious.      Objective:     Vital Signs (Most Recent):  Temp: 98.1 °F (36.7 °C) (09/15/20 1138)  Pulse: 62 (09/15/20 1138)  Resp: 16 (09/15/20 1138)  BP: 138/65 (09/15/20 1138)  SpO2: 95 % " (09/15/20 1138) Vital Signs (24h Range):  Temp:  [96.7 °F (35.9 °C)-99.1 °F (37.3 °C)] 98.1 °F (36.7 °C)  Pulse:  [60-90] 62  Resp:  [14-19] 16  SpO2:  [95 %-100 %] 95 %  BP: (128-177)/(65-96) 138/65     Weight: (!) 140.2 kg (309 lb)  Body mass index is 53.04 kg/m².     SpO2: 95 %       No intake or output data in the 24 hours ending 09/15/20 1316    Lines/Drains/Airways     Airway                 Airway - Non-Surgical 08/02/19 0915 Nasal Cannula 410 days          Peripheral Intravenous Line                 Peripheral IV - Single Lumen 09/14/20 18 G Left Antecubital 1 day                Physical Exam   Constitutional: She is oriented to person, place, and time. She appears well-developed and well-nourished. No distress.   HENT:   Head: Normocephalic and atraumatic.   Mouth/Throat: Oropharynx is clear and moist.   Eyes: Pupils are equal, round, and reactive to light. EOM are normal.   Neck: Normal range of motion. Neck supple. No JVD present.   Cardiovascular: Normal rate and regular rhythm. Exam reveals no gallop and no friction rub.   No murmur heard.  Pulmonary/Chest: Effort normal and breath sounds normal. No respiratory distress. She has no wheezes. She has no rales. She exhibits no tenderness.   Abdominal: Soft. Bowel sounds are normal. She exhibits no distension. There is no abdominal tenderness.   Musculoskeletal: Normal range of motion.         General: No edema.   Lymphadenopathy:     She has no cervical adenopathy.   Neurological: She is alert and oriented to person, place, and time.   Skin: Skin is warm and dry. No erythema.   Psychiatric: She has a normal mood and affect. Her behavior is normal. Judgment and thought content normal.       Significant Labs:   CMP   Recent Labs   Lab 09/14/20  1450 09/15/20  0236    138   K 4.4 4.2    106   CO2 26 26   GLU 95 98   BUN 11 13   CREATININE 0.9 1.0   CALCIUM 8.6* 8.6*   PROT 9.5*  --    ALBUMIN 3.1*  --    BILITOT 0.3  --    ALKPHOS 59  --    AST 16   --    ALT 19  --    ANIONGAP 5* 6*   ESTGFRAFRICA >60.0 >60.0   EGFRNONAA >60.0 >60.0   , CBC   Recent Labs   Lab 09/14/20  1450 09/15/20  0237   WBC 3.62* 3.53*   HGB 10.7* 10.2*   HCT 34.1* 32.0*    200    and Troponin   Recent Labs   Lab 09/14/20  1450   TROPONINI <0.006       Significant Imaging: PET stress:  Whole heart absolute myocardial perfusion (cc/min/g) averaged 0.82 cc/min/g at rest, which is normal, 0.94 cc/min/g at stress, which is moderately reduced, and 1.15  CFR, which is severely reduced.    Visually estimated ejection fraction is normal at rest and normal at stress.    Wall motion was normal at rest and during stress.    LV cavity size is normal at rest and stress.    The EKG portion of this study is uninterpretable.    There were no arrhythmias during stress.    The patient reported no chest pain during the stress test.    When compared to the previous study from 3/29/2017, there are significant changes.    Assessment and Plan:     Brief HPI: Amna Chawla is a 54 yo F with PMHx PEA with VT/VF arrest, prolonged QT and CHB s/p dc ICD (2/15/2013) followed by CRT-D upgrade (9/27/2017) in setting of EF 40-45% and chronic RV pacing, now with LV lead fracture with plans to undergo revision by Dr Mejia in ~2 weeks who presents to ED with acute chest pain. Patient reports noticing chest pain after waking up this morning - centrally located with radiation to her back, described as a severe spasm / aching sensation. Acknowledges positional or pleuritic components. On full ROS, experienced nausea but otherwise denies associated shortness of breath, sweats, near syncope, syncope, etc.Patient received nitro and morphine which relieved pain. EKG shows V-paced rhythm 73 bpm with TWIs in I, II, aVF, V4-V6. Labs notable for trop <0.006. Of note patient had negative PET stress in 3/2019.    * Chest pain  54 yo F with complex cardiac hx who presents to ED with severe acute onset chest /back  discomfort of unclear etiology. Patient hypertensive on arrival 170s systolic, with symmetrical cuff pressures bilaterally.     CXR negative. EKG unremarkable for acute ischemia but does show . Trop negative x2. Patient has risk factors for ACS including previous arrest, DM, and hyperlipidemia. 2D echo shows low normal EF of 50%. PET stress shows global reduced coronary reserve flow under stress and is normal at rest. This is consistent with stable coronary disease and is likely not amenable to intervention at this time. No other significant findings were noted. Given the stress findings and clinical history, ACS is unlikely and the patient's pain is more consistent with noncardiac chest pain.     Plan:  - BP control < 130/80 mmHg  - Evaluate for noncardiac causes of chest /back pain per Medicine, consider plain films of spine.  - Patient currently on ARB, statin, BB and antiplatelet therapy and is not on ASA due to allergy  - Maintain on telemetry and obtain EKG if any acute changes  - Follow up outpatient in Cardiology clinic       Thank you for your consult. Cardiology will sign off at this point and patient can follow up in clinic. Please contact us if you have any additional questions.      VTE Risk Mitigation (From admission, onward)         Ordered     rivaroxaban tablet 20 mg  with dinner      09/14/20 2301     IP VTE HIGH RISK PATIENT  Once      09/14/20 1735     Place sequential compression device  Until discontinued      09/14/20 1703                Konstantin Conn MD   Cardiology  [facility]

## 2020-09-15 NOTE — NURSING
Tele box changed out. Box [id number] coming through to monitor. HR 65, Sinus with IVCD and occasional paced beats.

## 2020-09-15 NOTE — PLAN OF CARE
Problem: Physical Therapy Goal  Goal: Physical Therapy Goal  Description: Goals to be met by: 20    Patient will increase functional independence with mobility by performin. Supine to sit with Modified [place] -not met  2. Sit to stand transfer with Modified [place] -not met  3. Gait  x 250 feet with Supervision -not met  4. Ascend/descend 5 stair with right Handrails Stand-by Assistance -not met  Outcome: Ongoing, Progressing   Evaluation completed and goals appropriate. Anna Walton, PT  9/15/2020

## 2020-09-15 NOTE — PLAN OF CARE
SW spoke to patient. Patient verified information on FS. Patient was calm and cooperative during dc planning assessment. Patient denies use of illicit drugs/ETOH. Patient lives at home with her dependent children. There are no steps to enter the home. Patient has family supports at home at [place]. Patient has transportation at [place]. SW/CM to follow and assist with needs as indicated.     Tiffany Mina MD    [id number] - [place], [place] - [address] AT [facility]  [address] [place] [address]  Phone: [phone] Fax: [phone]       09/15/20 1105   Discharge Assessment   Assessment Type Discharge Planning Assessment   Confirmed/corrected address and phone number on facesheet? Yes   Assessment information obtained from? Patient   Expected Length of Stay (days) 1   Communicated expected length of stay with patient/caregiver yes   Prior to hospitilization cognitive status: Alert/Oriented   Prior to hospitalization functional status: Independent   Current cognitive status: Alert/Oriented   Current Functional Status: Independent   Lives With child(glen), dependent   Able to Return to Prior Arrangements yes   Is patient able to care for self after discharge? Yes   Patient's perception of discharge disposition home or selfcare   Patient currently being followed by outpatient case management? No   Patient currently receives any other outside agency services? No   Equipment Currently Used at Home none   Does the patient have transportation home? Yes   Transportation Anticipated family or friend will provide   Does the patient receive services at the [facility]? No   Discharge Plan A Home;Home with family   Discharge Plan B Home;Home with family   DME Needed Upon Discharge  none   Patient/Family in Agreement with Plan yes       Silvia Luis LMSW  [facility]  [id number]

## 2020-09-16 NOTE — HOSPITAL COURSE
Ms. Chawla was admitted to hospital medicine for evaluation of chest pain to rule out MI. She received both NG and morphine in the ED with resolution in CP. EKG was negative for acute ischemia but showed V-paced rhythm 73 bpm with TWIs in I, II, aVF, V4-V6. Labs notable for trop <0.006 x 2. Cardiology was consulted and recommended PET stress and 2D Echo. Echo showed EF of 50% and no significant changes when compared to previous. PET stress no focal perfusion abnormality, showed global reduced coronary reserve flow under stress and was normal at rest. Findings were consistent with stable coronary disease. Cardiology found it unlikely to be amenable to intervention at this time and recommend following up as oupatient in Cardiology clinic. Suspect etiology secondary to back pain/ muscle spasm and prescribed lidocaine patch as well as zanaflex. Patient was stable for discharge with close follow up in Cardiology clinic. Plan was discussed with the patient, she verbalized understanding, all questions answered.

## 2020-09-16 NOTE — ASSESSMENT & PLAN NOTE
Biventricular automatic implantable cardioverter defibrillator in situ  History of sudden cardiac arrest    -Pt. Reports constant chest pain on morning of admission with radiation to her back. Relieved with NG, but pt. Also received morphine at the same time.   -Pt. With know hx of cardiac arrhythmia and arrest s/p AICD, but previous ischemic work-ups have been negative  -EKG shows V-paced rhythm 73 bpm with TWIs in I, II, aVF, V4-V6, new from prior EKG.   - Troponin negative x 2  - Cardiology consulted, appreciate recommendations.   - PET stress: global reduced coronary reserve flow under stress and is normal at rest. This is consistent with stable coronary disease. No focal perfusion abnormality  - TTE: EF 50%, mild LVDD, normal systolic function  - Per cardiology, likely not amenable to intervention at this time. Continue current medical management  - follow up as outpatient in Cardiology clinic  - pt complaining more of back pain than chest pain> given muscle relaxer and lidocaine patches with some improvement, suspect MSK in nature  - stable for dc

## 2020-09-16 NOTE — PLAN OF CARE
09/16/20 0852   Final Note   Assessment Type Final Discharge Note   Anticipated Discharge Disposition Home   What phone number can be called within the next 1-3 days to see how you are doing after discharge? [phone]   Discharge plans and expectations educations in teach back method with documentation complete? Yes   Right Care Referral Info   Post Acute Recommendation No Care   Post-Acute Status   Post-Acute Authorization Other   Other Status No Post-Acute Service Needs     Future Appointments   Date Time Provider Department Center   10/1/2020 11:30 AM Tiffany Mina MD [facility]   10/2/2020  8:30 AM LAB, APPOINTMENT [facility] LAB VNP [facility]   10/6/2020  8:40 AM COVID TESTING, [facility] GEN SURG [facility] GENSUR [facility]   10/9/2020 10:00 AM 3PREP, LEONILA [facility] SSCU [facility]   10/12/2020  4:00 PM Toby Paredes MD [facility] NEURO [facility]   10/16/2020 10:00 AM PACEMAKER, ICD [facility] BRYON Treviño [facility]   10/26/2020 10:15 AM [facility] OIC-US1 MASTER [facility] ULTR IC Imaging Ctr   10/26/2020 11:00 AM [facility]-MAMMO1 [facility] MAMMOIC Imaging Ctr   11/27/2020 10:00 AM HOME MONITOR DEVICE CHECK, [facility] BRYON Treviño [facility]
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
## 2020-09-16 NOTE — ASSESSMENT & PLAN NOTE
-Pt. Follows with psych as an outpatient, continue prescribed meds, abilify, cymbalta  -Klonopin 1mg PO severe anxiety
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
## 2020-09-21 ENCOUNTER — TELEPHONE (OUTPATIENT)
Dept: NEUROLOGY | Facility: CLINIC | Age: 55
End: 2020-09-21

## 2020-09-21 DIAGNOSIS — G43.711 CHRONIC MIGRAINE WITHOUT AURA, WITH INTRACTABLE MIGRAINE, SO STATED, WITH STATUS MIGRAINOSUS: ICD-10-CM

## 2020-09-23 ENCOUNTER — TELEPHONE (OUTPATIENT)
Dept: PHARMACY | Facility: CLINIC | Age: 55
End: 2020-09-23

## 2020-09-23 NOTE — TELEPHONE ENCOUNTER
Call attempt 1-ajovy PFS refill and intervention-No Answer. Left Voicemail. Sent [facility]. Copay $0 @004.     José Antonio Cheng, PharmD  [facility]

## 2020-09-25 NOTE — TELEPHONE ENCOUNTER
Patient returned refill call for [employer]. Verified to ship on Monday 9/28 for delivery on Tuesday 9/29 via [employer]. Delivery address verified. $0.00 copay at 004.     Harry Curry, PharmD  Clinical Pharmacist  [facility]  P: [phone] d

## 2020-09-26 DIAGNOSIS — G43.711 CHRONIC MIGRAINE WITHOUT AURA, WITH INTRACTABLE MIGRAINE, SO STATED, WITH STATUS MIGRAINOSUS: ICD-10-CM

## 2020-09-28 ENCOUNTER — TELEPHONE (OUTPATIENT)
Dept: ELECTROPHYSIOLOGY | Facility: CLINIC | Age: 55
End: 2020-09-28

## 2020-09-28 NOTE — TELEPHONE ENCOUNTER
Spoke with patient regarding her upcoming LV lead revision. Original plan was to try to back the LV lead out over an inner, long coronary wire and retain access however would like to be prepared for extraction-reimplantation if the lead fracture prevents wire placement.  Discussed this with patient and daughter. Discussed implanted cardiac device lead extraction, including all risks and benefits at length. The risks discussed included but were not limited to death, MI, CVA, pneumothorax, severe bleeding, perforation with need for surgical repair, infection, tamponade. Discussed possible/likely need for laser sheath removal of leads and/or the use of other specialized tools for extraction.  I discussed with patient risks, indications, benefits, and alternatives of the planned procedure. All questions were answered. Patient understands and wishes to proceed.    Discussed that these types of procedures currently are only allowed on a Monday and thus will need to reschedule her. She understood and was in agreement.

## 2020-09-30 ENCOUNTER — PATIENT MESSAGE (OUTPATIENT)
Dept: ELECTROPHYSIOLOGY | Facility: CLINIC | Age: 55
End: 2020-09-30

## 2020-09-30 ENCOUNTER — EXTERNAL CHRONIC CARE MANAGEMENT (OUTPATIENT)
Dept: PRIMARY CARE CLINIC | Facility: CLINIC | Age: 55
End: 2020-09-30
Payer: MEDICARE

## 2020-09-30 PROCEDURE — 99490 PR CHRONIC CARE MGMT, 1ST 20 MIN: ICD-10-PCS | Mod: S$PBB,,, | Performed by: INTERNAL MEDICINE

## 2020-09-30 PROCEDURE — 99490 CHRNC CARE MGMT STAFF 1ST 20: CPT | Mod: S$PBB,,, | Performed by: INTERNAL MEDICINE

## 2020-09-30 PROCEDURE — 99490 CHRNC CARE MGMT STAFF 1ST 20: CPT | Mod: PBBFAC | Performed by: INTERNAL MEDICINE

## 2020-10-01 ENCOUNTER — PATIENT MESSAGE (OUTPATIENT)
Dept: INTERNAL MEDICINE | Facility: CLINIC | Age: 55
End: 2020-10-01

## 2020-10-02 DIAGNOSIS — Z01.818 PRE-OP TESTING: ICD-10-CM

## 2020-10-02 DIAGNOSIS — I49.9 CARDIAC ARRHYTHMIA, UNSPECIFIED CARDIAC ARRHYTHMIA TYPE: ICD-10-CM

## 2020-10-02 DIAGNOSIS — Z01.89 ENCOUNTER FOR OTHER SPECIFIED SPECIAL EXAMINATIONS: ICD-10-CM

## 2020-10-02 DIAGNOSIS — T82.110D PACEMAKER LEAD MALFUNCTION, SUBSEQUENT ENCOUNTER: Primary | ICD-10-CM

## 2020-10-06 ENCOUNTER — PATIENT MESSAGE (OUTPATIENT)
Dept: PSYCHIATRY | Facility: CLINIC | Age: 55
End: 2020-10-06

## 2020-10-07 ENCOUNTER — PATIENT MESSAGE (OUTPATIENT)
Dept: INTERNAL MEDICINE | Facility: CLINIC | Age: 55
End: 2020-10-07

## 2020-10-07 ENCOUNTER — PATIENT OUTREACH (OUTPATIENT)
Dept: ADMINISTRATIVE | Facility: HOSPITAL | Age: 55
End: 2020-10-07

## 2020-10-07 DIAGNOSIS — M25.562 CHRONIC PAIN OF BOTH KNEES: Primary | ICD-10-CM

## 2020-10-07 DIAGNOSIS — M25.569 KNEE PAIN, UNSPECIFIED CHRONICITY, UNSPECIFIED LATERALITY: Primary | ICD-10-CM

## 2020-10-07 DIAGNOSIS — M25.561 CHRONIC PAIN OF BOTH KNEES: Primary | ICD-10-CM

## 2020-10-07 DIAGNOSIS — G89.29 CHRONIC PAIN OF BOTH KNEES: Primary | ICD-10-CM

## 2020-10-07 NOTE — PROGRESS NOTES
Health Maintenance Due   Topic Date Due    Hepatitis C Screening  1965    HIV Screening  07/05/1981    Influenza Vaccine (1) 08/01/2020     Chart review completed.

## 2020-10-08 ENCOUNTER — CLINICAL SUPPORT (OUTPATIENT)
Dept: REHABILITATION | Facility: OTHER | Age: 55
End: 2020-10-08
Payer: MEDICARE

## 2020-10-08 DIAGNOSIS — M25.569 KNEE PAIN, UNSPECIFIED CHRONICITY, UNSPECIFIED LATERALITY: ICD-10-CM

## 2020-10-08 DIAGNOSIS — M25.562 ACUTE PAIN OF LEFT KNEE: ICD-10-CM

## 2020-10-08 DIAGNOSIS — M62.81 MUSCLE WEAKNESS OF LOWER EXTREMITY: ICD-10-CM

## 2020-10-08 DIAGNOSIS — M25.662 DECREASED RANGE OF MOTION OF LEFT KNEE: ICD-10-CM

## 2020-10-08 DIAGNOSIS — Z74.09 IMPAIRED FUNCTIONAL MOBILITY, BALANCE, GAIT, AND ENDURANCE: ICD-10-CM

## 2020-10-08 PROCEDURE — 97162 PT EVAL MOD COMPLEX 30 MIN: CPT | Mod: PN
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
## 2020-10-08 NOTE — PATIENT INSTRUCTIONS
/self-massage/foam roller iliotibial band (ITB)          Apply pressure while you roll up and down along the muscle. This may be uncomfortable but not painful. You should sink in a bit but it shouldnt be agonizing.  A rolling pin can easily be used and works as well as the specialty tool above.     Copyright © 2123-9431 [employer]

## 2020-10-09 ENCOUNTER — PATIENT MESSAGE (OUTPATIENT)
Dept: NEUROLOGY | Facility: CLINIC | Age: 55
End: 2020-10-09

## 2020-10-10 NOTE — PT/OT/SLP EVAL
"Speech Language Pathology Evaluation  Cognitive/Bedside Swallow/  Discharge Summary    Patient Name:  Amna Chawla   MRN:  [id number]  Admitting Diagnosis: Suspected embolic stroke of L MCA    Recommendations:                  General Recommendations:  Follow-up not indicated  Diet recommendations:  Regular, Nectar Thick   Aspiration Precautions:   · Fully awake and alert for PO intake  · Fully upright position for PO intake  · Small bites/ sips  · Slow rate of eating/ drinking  · 1 bite/ sip @ a time  · Refrain from talking prior to swallow completion   · Discontinue PO upon: coughing, throat clearing, choking, wet vocal quality, wet breath sounds, watery eyes, reddened facial features  General Precautions: Standard, fall  Communication strategies:  go to room if call light pushed    History:     Past Medical History:   Diagnosis Date    Anxiety     Asthma     Brain anoxic injury     Coronary artery disease     Defibrillator activation 2013    Depression     History of sudden cardiac arrest 2/2013    [facility]A arrest with subsequent long-QT    Hx of psychiatric care     Hypertension     Left atrial enlargement 4/11/2018    Pacemaker 2013    Psychiatric problem     Seizures     Sleep difficulties     Stroke     weakness lt side    Therapy        Past Surgical History:   Procedure Laterality Date    breast reduction      BREAST SURGERY      CARDIAC DEFIBRILLATOR PLACEMENT      CARDIAC DEFIBRILLATOR PLACEMENT      CARPAL TUNNEL RELEASE Right     COSMETIC SURGERY      HERNIA REPAIR      HIATAL HERNIA REPAIR      INSERT / REPLACE / REMOVE PACEMAKER      TUBAL LIGATION         Social History: Per pt, lives with , two daughters, and grandson. Does not drive. Is not responsible for finances.     Prior diet: Per pt, regular consistency diet and thin liquids.     Occupation/hobbies/homemaking: Per pt, quit working as  in 2013 s/p cardiac arrest.     Subjective     "25...It's (my " "headache) is to the point where I could take an ax and cut it." Pt re: severity of headache pain.     Pain/Comfort:  · Pain Rating 1: 10/10  · Location 1: head  · Pain Addressed 1: Nurse notified  · Pain Rating Post-Intervention 1: 10/10    Objective:     Cognitive Status:    · Orientation WFL  · Answered 3/3 long term memory q's  · During immediate memory tasks, recalled series of 5, 1-digit numbers and series of 5 unrelated words ind'ly   · Answered 4/4 basic problem solving q's ind'ly  · Sequenced series of 4 words according to given attribute  · Generated comparison/ contrast between 2 similar, common objects ind'ly   · Appeared WFL in spontaneous conversation with clinician      Receptive Language:   · Answered 7/7 complex y/n q's ind'ly  · Followed 4/4 complex commands ind'ly   · Appeared WFL in spontaneous conversation with clinician     Pragmatics:    · WFL    Expressive Language:  · Answered 6/6 responsive naming q's ind'ly  · Appeared WFL in spontaneous conversation with clinician       Motor Speech:  · No evidence of apraxia or dysarthria, appeared WFL     Voice:   · WFL    Oral Musculature Evaluation  · Oral Musculature: WFL  · Dentition: present and adequate  · Secretion Management: adequate  · Mucosal Quality: good  · Mandibular Strength and Mobility: WFL  · Oral Labial Strength and Mobility: WFL  · Lingual Strength and Mobility: WFL  · Velar Elevation: WFL  · Buccal Strength and Mobility: WFL  · Volitional Cough: WFL  · Volitional Swallow: WFL  · Voice Prior to PO Intake: Clear, dry     Bedside Swallow Eval:   Consistencies Assessed:  · Thin water- 4-5oz via multiple cup and straw sips in isolation and as liquid wash  · Regular saltine cracker- 1 cracker via bites x3    Oral Phase:   · Appeared WFL    Pharyngeal Phase:   · No overt clinical signs of aspiration observed.     Treatment:   Pt resting with eyes closed upon entry. Observed to ind'ly reposition from L side lying position to back lying " position to prepare for PO trials. HOB raised. Education provided re: role of SLP, definition/ risks/ overt clinical signs of aspiration, consistent implementation of all aspiration precautions listed above, and SLP POC. Encouragement provided to request SLP re-consult should she experience swallowing and/or cognitive-linguistic changes. Pt verbalized understanding of all education provided and agreement with SLP POC. No further questions.     Results discussed with NSG.     Assessment:     Amna Chawla is a 52 y.o. female with an no further SLP needs at this time. Please re-consult should changes occur.     Goals:    SLP Goals     Not on file          Multidisciplinary Problems (Resolved)        Problem: SLP Goal    Goal Priority Disciplines Outcome   SLP Goal   (Resolved)     SLP Outcome(s) achieved                   Plan:     · Plan of Care reviewed with:  patient   · SLP Follow-Up:  No       Discharge recommendations:  Discharge Facility/Level Of Care Needs: home     Time Tracking:     SLP Treatment Date:   04/12/18  Speech Start Time:  1455  Speech Stop Time:  1509     Speech Total Time (min):  14 min    Billable Minutes: Eval 7  and Eval Swallow and Oral Function 7    ELIZABETH Caldrea, CCC-SLP  [phone]  4/12/2018            Essential hypertension

## 2020-10-11 ENCOUNTER — PATIENT OUTREACH (OUTPATIENT)
Dept: ADMINISTRATIVE | Facility: OTHER | Age: 55
End: 2020-10-11

## 2020-10-12 ENCOUNTER — PATIENT MESSAGE (OUTPATIENT)
Dept: ORTHOPEDICS | Facility: CLINIC | Age: 55
End: 2020-10-12

## 2020-10-12 ENCOUNTER — OFFICE VISIT (OUTPATIENT)
Dept: NEUROLOGY | Facility: CLINIC | Age: 55
End: 2020-10-12
Payer: MEDICARE

## 2020-10-12 DIAGNOSIS — F33.1 DEPRESSION, MAJOR, RECURRENT, MODERATE: Primary | ICD-10-CM

## 2020-10-12 PROCEDURE — 99214 OFFICE O/P EST MOD 30 MIN: CPT | Mod: 95,,, | Performed by: PSYCHIATRY & NEUROLOGY

## 2020-10-12 PROCEDURE — 99214 PR OFFICE/OUTPT VISIT, EST, LEVL IV, 30-39 MIN: ICD-10-PCS | Mod: 95,,, | Performed by: PSYCHIATRY & NEUROLOGY

## 2020-10-12 RX ORDER — SERTRALINE HYDROCHLORIDE 25 MG/1
25 TABLET, FILM COATED ORAL DAILY
Qty: 30 TABLET | Refills: 11 | Status: SHIPPED | OUTPATIENT
Start: 2020-10-12 | End: 2021-05-10
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
## 2020-10-13 ENCOUNTER — OFFICE VISIT (OUTPATIENT)
Dept: ORTHOPEDICS | Facility: CLINIC | Age: 55
End: 2020-10-13
Payer: MEDICARE

## 2020-10-13 ENCOUNTER — HOSPITAL ENCOUNTER (OUTPATIENT)
Dept: RADIOLOGY | Facility: HOSPITAL | Age: 55
Discharge: HOME | End: 2020-10-13
Attending: PHYSICIAN ASSISTANT
Payer: MEDICARE

## 2020-10-13 VITALS — WEIGHT: 293 LBS | HEIGHT: 64 IN | BODY MASS INDEX: 50.02 KG/M2

## 2020-10-13 DIAGNOSIS — M17.12 PRIMARY OSTEOARTHRITIS OF LEFT KNEE: ICD-10-CM

## 2020-10-13 DIAGNOSIS — M25.562 LEFT KNEE PAIN, UNSPECIFIED CHRONICITY: Primary | ICD-10-CM

## 2020-10-13 DIAGNOSIS — M25.562 LEFT KNEE PAIN, UNSPECIFIED CHRONICITY: ICD-10-CM

## 2020-10-13 PROCEDURE — 73564 X-RAY EXAM KNEE 4 OR MORE: CPT | Mod: 26,LT,, | Performed by: RADIOLOGY

## 2020-10-13 PROCEDURE — 73562 X-RAY EXAM OF KNEE 3: CPT | Mod: 26,59,RT, | Performed by: RADIOLOGY

## 2020-10-13 PROCEDURE — 99999 PR PBB SHADOW E&M-EST. PATIENT-LVL V: CPT | Mod: PBBFAC,,, | Performed by: PHYSICIAN ASSISTANT

## 2020-10-13 PROCEDURE — 99214 PR OFFICE/OUTPT VISIT, EST, LEVL IV, 30-39 MIN: ICD-10-PCS | Mod: S$PBB,ICN,, | Performed by: PHYSICIAN ASSISTANT

## 2020-10-13 PROCEDURE — 99215 OFFICE O/P EST HI 40 MIN: CPT | Mod: PBBFAC,25 | Performed by: PHYSICIAN ASSISTANT

## 2020-10-13 PROCEDURE — 73562 X-RAY EXAM OF KNEE 3: CPT | Mod: TC,59,RT

## 2020-10-13 PROCEDURE — 99999 PR PBB SHADOW E&M-EST. PATIENT-LVL V: ICD-10-PCS | Mod: PBBFAC,,, | Performed by: PHYSICIAN ASSISTANT

## 2020-10-13 PROCEDURE — 99214 OFFICE O/P EST MOD 30 MIN: CPT | Mod: S$PBB,ICN,, | Performed by: PHYSICIAN ASSISTANT

## 2020-10-13 PROCEDURE — 73564 XR KNEE ORTHO LEFT WITH FLEXION: ICD-10-PCS | Mod: 26,LT,, | Performed by: RADIOLOGY

## 2020-10-13 PROCEDURE — 73562 XR KNEE ORTHO LEFT WITH FLEXION: ICD-10-PCS | Mod: 26,59,RT, | Performed by: RADIOLOGY

## 2020-10-13 NOTE — LETTER
October 13, 2020      Tiffany Mina MD  [address]  [facility] [address]           [facility] 5th Fl  [address]  [facility] [address]  Phone: [phone]          Patient: Amna Chawla   MR Number: [id number]   YOB: 1965   Date of Visit: 10/13/2020       Dear Dr. Sterling:    Thank you for referring Amna Chawla to me for evaluation. Attached you will find relevant portions of my assessment and plan of care.    If you have questions, please do not hesitate to call me. I look forward to following Amna Chawla along with you.    Sincerely,    Lisa Boggs PA-C    Enclosure  CC:  No Recipients    If you would like to receive this communication electronically, please contact [email or web address] or [phone] to request more information on [facility] [employer] access.    For providers and/or their staff who would like to refer a patient to [facility], please contact us through our one-stop-shop provider referral line, [facility] , at [phone].    If you feel you have received this communication in error or would no longer like to receive these types of communications, please e-mail [email or web address]

## 2020-10-13 NOTE — PROGRESS NOTES
Subjective:      Patient ID: Amna Chawla is a 55 y.o. female.    Chief Complaint: Pain of the Left Knee    HPI  55 year old female presents with chief complaint of left knee pain x 3 weeks. She denies trauma. Pain is more lateral. It is worse with prolonged sitting, kneeling on it, bending it, and going from sit to stand. Ibuprofen gives some relief but she isn't supposed to be taking that because she is on xarelto. She has attended 1 PT session and she has more sessions scheduled. She denies swelling. She does not use assistive devices.   Review of Systems   Constitution: Negative for chills, fever and night sweats.   Cardiovascular: Negative for chest pain.   Respiratory: Negative for cough and shortness of breath.    Hematologic/Lymphatic: Does not bruise/bleed easily.   Skin: Negative for color change.   Gastrointestinal: Negative for heartburn.   Genitourinary: Negative for dysuria.   Neurological: Negative for numbness and paresthesias.   Psychiatric/Behavioral: Negative for altered mental status.   Allergic/Immunologic: Negative for persistent infections.         Objective:            General    Vitals reviewed.  Constitutional: She is oriented to person, place, and time. She appears well-developed and well-nourished.   Cardiovascular: Normal rate.    Neurological: She is alert and oriented to person, place, and time.             Left Knee Exam:  ROM 0-125 degrees  No effusion  No warmth or erythema  TTP lateral joint line      X-ray: ordered and reviewed by myself. Mild left medial tibiofemoral cartilage space narrowing, likely degenerative in nature.      Assessment:       Encounter Diagnoses   Name Primary?    Primary osteoarthritis of left knee     Left knee pain, unspecified chronicity Yes          Plan:       Discussed treatment options with patient including activity modification and weight loss. She wants to try a knee brace. She declined injection. She will continue PT. She can take tylenol  arthritis and apply voltaren gel. RTC prn.

## 2020-10-14 ENCOUNTER — PATIENT MESSAGE (OUTPATIENT)
Dept: PSYCHIATRY | Facility: CLINIC | Age: 55
End: 2020-10-14

## 2020-10-16 ENCOUNTER — OFFICE VISIT (OUTPATIENT)
Dept: PSYCHIATRY | Facility: CLINIC | Age: 55
End: 2020-10-16
Payer: MEDICARE

## 2020-10-16 DIAGNOSIS — F41.9 ANXIETY: ICD-10-CM

## 2020-10-16 DIAGNOSIS — F33.1 DEPRESSION, MAJOR, RECURRENT, MODERATE: Primary | ICD-10-CM

## 2020-10-16 DIAGNOSIS — G47.00 INSOMNIA, UNSPECIFIED TYPE: ICD-10-CM

## 2020-10-16 PROCEDURE — 90833 PSYTX W PT W E/M 30 MIN: CPT | Mod: 95,,, | Performed by: NURSE PRACTITIONER

## 2020-10-16 PROCEDURE — 99213 PR OFFICE/OUTPT VISIT, EST, LEVL III, 20-29 MIN: ICD-10-PCS | Mod: 95,,, | Performed by: NURSE PRACTITIONER

## 2020-10-16 PROCEDURE — 99213 OFFICE O/P EST LOW 20 MIN: CPT | Mod: 95,,, | Performed by: NURSE PRACTITIONER

## 2020-10-16 PROCEDURE — 90833 PR PSYCHOTHERAPY W/PATIENT W/E&M, 30 MIN (ADD ON): ICD-10-PCS | Mod: 95,,, | Performed by: NURSE PRACTITIONER

## 2020-10-16 RX ORDER — ARIPIPRAZOLE 15 MG/1
15 TABLET ORAL DAILY
Qty: 90 TABLET | Refills: 1 | Status: SHIPPED | OUTPATIENT
Start: 2020-10-16 | End: 2021-05-10 | Stop reason: SDUPTHER

## 2020-10-16 NOTE — PROGRESS NOTES
"Outpatient Psychiatry Follow-Up Visit (MD/NP)    10/16/2020    Clinical Status of Patient:  Outpatient (Ambulatory)    Chief Complaint:  Amna Chawla is a 55 y.o. female who presents today for follow-up of depression and anxiety.  Met with patient.      Last Visit:  was on 8/20/19. Chart and  reviewed.   The patient location is: home  The chief complaint leading to consultation is: depression and anxiety    Visit type: audiovisual    Face to Face time with patient: 26 minutes  30 minutes of total time spent on the encounter, which includes face to face time and non-face to face time preparing to see the patient (eg, review of tests), Obtaining and/or reviewing separately obtained history, Documenting clinical information in the electronic or other health record, Independently interpreting results (not separately reported) and communicating results to the patient/family/caregiver, or Care coordination (not separately reported).     Each patient to whom he or she provides medical services by telemedicine is:  (1) informed of the relationship between the physician and patient and the respective role of any other health care provider with respect to management of the patient; and (2) notified that he or she may decline to receive medical services by telemedicine and may withdraw from such care at any time.    Interval History and Content of Current Session:  Current Medication Profile for Psych  · Continue Cymbalta 60 mg po BID  · Increase to Abilify 15 mg po daily  · DC Ambien (not covered by insurance and Zaleplon not effective)  · Start Trazadone 100-150 mg po q hs PRN insomnia  · Continue  Atarx (hydroxyzine) 50 mg po q hs PRN insomnia and start Atarax 10 mg po TID PRN anxiety  · Continue Klonopin 1 mg po daily PRN severe anxiety  · Also taking Neurontin and Topamax from Neurology    Virtual Visit: Continues to endorse depression symptoms and "terrible" mood. Pt stopped Cymbalta. Neuro started zoloft. Not " taking Trazadone. Restarted Ambien.  Pt will f/u with me before end of the year.     Denies SI/HI/AVH. Denies side effects to medications; no EPS.     Psychotherapy:  · Target symptoms: depression, anxiety   · Why chosen therapy is appropriate versus another modality: relevant to diagnosis  · Outcome monitoring methods: self-report, observation  · Therapeutic intervention type: insight oriented psychotherapy, CBT  · Topics discussed/themes: relationships difficulties, parenting issues, stress related to medical comorbidities, difficulty managing affect in interpersonal relationships, building skills sets for symptom management, symptom recognition  · The patient's response to the intervention is accepting. The patient's progress toward treatment goals is not progressing.   · Duration of intervention: 18 minutes.    Review of Systems   · PSYCHIATRIC: Pertinant items are noted in the narrative.  · CONSTITUTIONAL: No weight gain or loss.   · ENDOCRINE: No polydipsia or polyuria.  · INTEGUMENTARY: No rashes or lacerations.  · EYES: No exophthalmos, jaundice or blindness.  · ENT: No dizziness, tinnitus or hearing loss.  · RESPIRATORY: No shortness of breath.  · GASTROINTESTINAL: No nausea, vomiting, pain, constipation or diarrhea.  · GENITOURINARY: No frequency, dysuria or sexual dysfunction.  · HEMATOLOGIC/LYMPHATIC: No excessive bleeding, prolonged or excessive bleeding after dental extraction/injury.  · ALLERGIC/IMMUNOLOGIC: No allergic response to materials, foods or animals at this time.    Past Medical, Family and Social History: The patient's past medical, family and social history have been reviewed and updated as appropriate within the electronic medical record - see encounter notes.    Compliance: yes    Side effects: None    Risk Parameters:  Patient reports no suicidal ideation  Patient reports no homicidal ideation  Patient reports no self-injurious behavior  Patient reports no violent behavior    Exam  (detailed: at least 9 elements; comprehensive: all 15 elements)   Constitutional  Vitals:  Most recent vital signs, dated less than 90 days prior to this appointment, were reviewed.   There were no vitals filed for this visit.     General:  NA     Musculoskeletal  Muscle Strength/Tone:  not examined   Gait & Station:  NA     Psychiatric  Speech:  no latency; no press   Mood & Affect:  dysthymic, irritable  irritable   Thought Process:  normal and logical   Associations:  intact   Thought Content:  normal, no suicidality, no homicidality, delusions, or paranoia   Insight:  has awareness of illness   Judgement: behavior is adequate to circumstances, age appropriate   Orientation:  grossly intact, person, place, situation, time/date   Memory: intact for content of interview, able to remember recent events- yes, able to remember remote events- yes   Language: grossly intact   Attention Span & Concentration:  able to focus   Fund of Knowledge:  intact and appropriate to age and level of education, familiar with aspects of current personal life     Assessment and Diagnosis   Status/Progress: Based on the examination today, the patient's problem(s) is/are failing to respond as expected to treatment.  New problems have not been presented today.   Co-morbidities and Lack of compliance are not complicating management of the primary condition.  There are no active rule-out diagnoses for this patient at this time.     General Impression:       ICD-10-CM ICD-9-CM   1. Depression, major, recurrent, moderate  F33.1 296.32   2. Insomnia, unspecified type  G47.00 780.52   3. Anxiety  F41.9 300.00       Intervention/Counseling/Treatment Plan   · Medication Management: The risks and benefits of medication were discussed with the patient.   · Continue Cymbalta 60 mg po BID  · Continue Abilify 15 mg po daily  · Reorder Ambien 10 mg po q sh PRN insomnia (Trazodone and  Zaleplon not effective)  · Hold Trazadone 100-150 mg po q hs PRN  insomnia  · Continue  Atarx (hydroxyzine) 50 mg po q hs PRN insomnia and start Atarax 10 mg po TID PRN anxiety  · Continue Klonopin 1 mg po daily PRN severe anxiety    Return to Clinic: 3 months     Risks, benefits, side effects and alternative treatments discussed with patient. Patient agrees with the current plan as documented.  Encouraged Patient to keep future appointments.  Take medications as prescribed and abstain from substance abuse.  Pt to present to ED for thoughts to harm herself or others

## 2020-10-20 DIAGNOSIS — G43.711 CHRONIC MIGRAINE WITHOUT AURA, WITH INTRACTABLE MIGRAINE, SO STATED, WITH STATUS MIGRAINOSUS: Primary | ICD-10-CM

## 2020-10-21 ENCOUNTER — TELEPHONE (OUTPATIENT)
Dept: INTERNAL MEDICINE | Facility: CLINIC | Age: 55
End: 2020-10-21

## 2020-10-21 NOTE — TELEPHONE ENCOUNTER
----- Message from Shweta Pastrana sent at 10/21/2020  8:44 AM CDT -----  Contact: self/[phone]  When is the first available appointment: 3-1-21  What is the nature of the appointment: f/U  What visit type: ep  Patient preference of timeframe to be scheduled: ASAP.       Comments: pt missed her lift and was not able to make her appointment on today. Pt would like a call back.     Please advise

## 2020-10-22 ENCOUNTER — TELEPHONE (OUTPATIENT)
Dept: NEUROLOGY | Facility: CLINIC | Age: 55
End: 2020-10-22
Payer: MEDICARE

## 2020-10-22 NOTE — TELEPHONE ENCOUNTER
"Katina Hernandez Staff             Dr. Cortez   Medicare requires that "15 or more headache days lasting 4 or more hours" and the list of muscles being inject with the dose of each muscle be documented in clinical notes. Can you please do an Addendum, and notify me when completed?  Medicare authorization process is 12 to 15 business days.     Thanks,     Katina Green     [employer] Precert[employer] Nurse-LPN   [phone]        "

## 2020-10-26 ENCOUNTER — DOCUMENTATION ONLY (OUTPATIENT)
Dept: NEUROLOGY | Facility: CLINIC | Age: 55
End: 2020-10-26

## 2020-10-26 NOTE — PROGRESS NOTES
Regarding Melissa Chawla, BOTOX is an FDA approved indicated therapy for intractable chronic migraine headaches given that she failed > 3 prophylactic medications  Please see below FDA approved protocol:      Please refer to   [email or web address]     David Cortez MD

## 2020-10-31 ENCOUNTER — EXTERNAL CHRONIC CARE MANAGEMENT (OUTPATIENT)
Dept: PRIMARY CARE CLINIC | Facility: CLINIC | Age: 55
End: 2020-10-31
Payer: MEDICARE

## 2020-10-31 PROCEDURE — 99490 CHRNC CARE MGMT STAFF 1ST 20: CPT | Mod: S$PBB,,, | Performed by: INTERNAL MEDICINE

## 2020-10-31 PROCEDURE — 99490 PR CHRONIC CARE MGMT, 1ST 20 MIN: ICD-10-PCS | Mod: S$PBB,,, | Performed by: INTERNAL MEDICINE

## 2020-10-31 PROCEDURE — 99490 CHRNC CARE MGMT STAFF 1ST 20: CPT | Mod: PBBFAC | Performed by: INTERNAL MEDICINE

## 2020-11-01 ENCOUNTER — HOSPITAL ENCOUNTER (OUTPATIENT)
Facility: HOSPITAL | Age: 55
Discharge: HOME | End: 2020-11-02
Attending: EMERGENCY MEDICINE | Admitting: INTERNAL MEDICINE
Payer: MEDICARE

## 2020-11-01 DIAGNOSIS — K21.9 GASTROESOPHAGEAL REFLUX DISEASE WITHOUT ESOPHAGITIS: ICD-10-CM

## 2020-11-01 DIAGNOSIS — R07.9 LEFT-SIDED CHEST PAIN: Primary | ICD-10-CM

## 2020-11-01 DIAGNOSIS — G44.40 DRUG-INDUCED HEADACHE, NOT ELSEWHERE CLASSIFIED, NOT INTRACTABLE: ICD-10-CM

## 2020-11-01 DIAGNOSIS — Z86.73 HISTORY OF TIA (TRANSIENT ISCHEMIC ATTACK): ICD-10-CM

## 2020-11-01 DIAGNOSIS — R07.9 CHEST PAIN, UNSPECIFIED TYPE: ICD-10-CM

## 2020-11-01 DIAGNOSIS — R07.9 CHEST PAIN: ICD-10-CM

## 2020-11-01 DIAGNOSIS — E78.2 MIXED HYPERLIPIDEMIA: ICD-10-CM

## 2020-11-01 DIAGNOSIS — D51.9 ANEMIA DUE TO VITAMIN B12 DEFICIENCY, UNSPECIFIED B12 DEFICIENCY TYPE: ICD-10-CM

## 2020-11-01 DIAGNOSIS — E66.01 OBESITY, CLASS III, BMI 40-49.9 (MORBID OBESITY): ICD-10-CM

## 2020-11-01 DIAGNOSIS — I10 ESSENTIAL HYPERTENSION: ICD-10-CM

## 2020-11-01 DIAGNOSIS — Z79.01 LONG TERM (CURRENT) USE OF ANTICOAGULANTS: ICD-10-CM

## 2020-11-01 DIAGNOSIS — I48.0 PAROXYSMAL ATRIAL FIBRILLATION: ICD-10-CM

## 2020-11-01 PROBLEM — R51.9 HEADACHE: Status: ACTIVE | Noted: 2020-11-01

## 2020-11-01 LAB
ALBUMIN SERPL BCP-MCNC: 3 G/DL (ref 3.5–5.2)
ALP SERPL-CCNC: 51 U/L (ref 55–135)
ALT SERPL W/O P-5'-P-CCNC: 20 U/L (ref 10–44)
ANION GAP SERPL CALC-SCNC: 8 MMOL/L (ref 8–16)
AST SERPL-CCNC: 15 U/L (ref 10–40)
BASOPHILS # BLD AUTO: 0 K/UL (ref 0–0.2)
BASOPHILS NFR BLD: 0 % (ref 0–1.9)
BILIRUB SERPL-MCNC: 0.2 MG/DL (ref 0.1–1)
BNP SERPL-MCNC: 17 PG/ML (ref 0–99)
BUN SERPL-MCNC: 7 MG/DL (ref 6–20)
CALCIUM SERPL-MCNC: 8.3 MG/DL (ref 8.7–10.5)
CHLORIDE SERPL-SCNC: 105 MMOL/L (ref 95–110)
CO2 SERPL-SCNC: 22 MMOL/L (ref 23–29)
CREAT SERPL-MCNC: 0.8 MG/DL (ref 0.5–1.4)
CTP QC/QA: YES
DIFFERENTIAL METHOD: ABNORMAL
EOSINOPHIL # BLD AUTO: 0 K/UL (ref 0–0.5)
EOSINOPHIL NFR BLD: 0.3 % (ref 0–8)
ERYTHROCYTE [DISTWIDTH] IN BLOOD BY AUTOMATED COUNT: 17.1 % (ref 11.5–14.5)
EST. GFR  (AFRICAN AMERICAN): >60 ML/MIN/1.73 M^2
EST. GFR  (NON AFRICAN AMERICAN): >60 ML/MIN/1.73 M^2
GLUCOSE SERPL-MCNC: 92 MG/DL (ref 70–110)
HCT VFR BLD AUTO: 33.7 % (ref 37–48.5)
HGB BLD-MCNC: 11.6 G/DL (ref 12–16)
IMM GRANULOCYTES # BLD AUTO: 0.01 K/UL (ref 0–0.04)
IMM GRANULOCYTES NFR BLD AUTO: 0.3 % (ref 0–0.5)
LYMPHOCYTES # BLD AUTO: 1.7 K/UL (ref 1–4.8)
LYMPHOCYTES NFR BLD: 42.8 % (ref 18–48)
MCH RBC QN AUTO: 30.9 PG (ref 27–31)
MCHC RBC AUTO-ENTMCNC: 34.4 G/DL (ref 32–36)
MCV RBC AUTO: 90 FL (ref 82–98)
MONOCYTES # BLD AUTO: 0.5 K/UL (ref 0.3–1)
MONOCYTES NFR BLD: 13.3 % (ref 4–15)
NEUTROPHILS # BLD AUTO: 1.7 K/UL (ref 1.8–7.7)
NEUTROPHILS NFR BLD: 43.3 % (ref 38–73)
NRBC BLD-RTO: 0 /100 WBC
PLATELET # BLD AUTO: 213 K/UL (ref 150–350)
PMV BLD AUTO: 13.8 FL (ref 9.2–12.9)
POTASSIUM SERPL-SCNC: 3.7 MMOL/L (ref 3.5–5.1)
PROT SERPL-MCNC: 8.8 G/DL (ref 6–8.4)
RBC # BLD AUTO: 3.76 M/UL (ref 4–5.4)
SARS-COV-2 RDRP RESP QL NAA+PROBE: NEGATIVE
SODIUM SERPL-SCNC: 135 MMOL/L (ref 136–145)
TROPONIN I SERPL DL<=0.01 NG/ML-MCNC: 0.01 NG/ML (ref 0–0.03)
TROPONIN I SERPL DL<=0.01 NG/ML-MCNC: 0.01 NG/ML (ref 0–0.03)
WBC # BLD AUTO: 3.9 K/UL (ref 3.9–12.7)

## 2020-11-01 PROCEDURE — 83880 ASSAY OF NATRIURETIC PEPTIDE: CPT

## 2020-11-01 PROCEDURE — G0378 HOSPITAL OBSERVATION PER HR: HCPCS

## 2020-11-01 PROCEDURE — 25000003 PHARM REV CODE 250: Performed by: EMERGENCY MEDICINE

## 2020-11-01 PROCEDURE — U0002 COVID-19 LAB TEST NON-CDC: HCPCS | Performed by: EMERGENCY MEDICINE

## 2020-11-01 PROCEDURE — 93005 ELECTROCARDIOGRAM TRACING: CPT

## 2020-11-01 PROCEDURE — 25000242 PHARM REV CODE 250 ALT 637 W/ HCPCS: Performed by: EMERGENCY MEDICINE

## 2020-11-01 PROCEDURE — 99285 EMERGENCY DEPT VISIT HI MDM: CPT | Mod: CS,,, | Performed by: EMERGENCY MEDICINE

## 2020-11-01 PROCEDURE — 99285 PR EMERGENCY DEPT VISIT,LEVEL V: ICD-10-PCS | Mod: CS,,, | Performed by: EMERGENCY MEDICINE

## 2020-11-01 PROCEDURE — 99220 PR INITIAL OBSERVATION CARE,LEVL III: ICD-10-PCS | Mod: ,,, | Performed by: NURSE PRACTITIONER

## 2020-11-01 PROCEDURE — 99220 PR INITIAL OBSERVATION CARE,LEVL III: CPT | Mod: ,,, | Performed by: NURSE PRACTITIONER

## 2020-11-01 PROCEDURE — 85025 COMPLETE CBC W/AUTO DIFF WBC: CPT

## 2020-11-01 PROCEDURE — 96374 THER/PROPH/DIAG INJ IV PUSH: CPT

## 2020-11-01 PROCEDURE — 80053 COMPREHEN METABOLIC PANEL: CPT

## 2020-11-01 PROCEDURE — 99285 EMERGENCY DEPT VISIT HI MDM: CPT | Mod: 25

## 2020-11-01 PROCEDURE — 63600175 PHARM REV CODE 636 W HCPCS: Performed by: EMERGENCY MEDICINE

## 2020-11-01 PROCEDURE — 84484 ASSAY OF TROPONIN QUANT: CPT | Mod: 91

## 2020-11-01 RX ORDER — MORPHINE SULFATE 4 MG/ML
4 INJECTION, SOLUTION INTRAMUSCULAR; INTRAVENOUS
Status: COMPLETED | OUTPATIENT
Start: 2020-11-01 | End: 2020-11-01

## 2020-11-01 RX ORDER — IPRATROPIUM BROMIDE AND ALBUTEROL SULFATE 2.5; .5 MG/3ML; MG/3ML
3 SOLUTION RESPIRATORY (INHALATION) EVERY 6 HOURS PRN
Status: DISCONTINUED | OUTPATIENT
Start: 2020-11-01 | End: 2020-11-02 | Stop reason: HOSPADM

## 2020-11-01 RX ORDER — HEPARIN SODIUM 5000 [USP'U]/ML
5000 INJECTION, SOLUTION INTRAVENOUS; SUBCUTANEOUS EVERY 8 HOURS
Status: DISCONTINUED | OUTPATIENT
Start: 2020-11-01 | End: 2020-11-01 | Stop reason: ALTCHOICE

## 2020-11-01 RX ORDER — LIDOCAINE HYDROCHLORIDE 20 MG/ML
10 SOLUTION OROPHARYNGEAL
Status: COMPLETED | OUTPATIENT
Start: 2020-11-01 | End: 2020-11-01

## 2020-11-01 RX ORDER — KETOROLAC TROMETHAMINE 30 MG/ML
30 INJECTION, SOLUTION INTRAMUSCULAR; INTRAVENOUS ONCE
Status: COMPLETED | OUTPATIENT
Start: 2020-11-02 | End: 2020-11-02

## 2020-11-01 RX ORDER — CLOPIDOGREL BISULFATE 75 MG/1
75 TABLET ORAL DAILY
Status: DISCONTINUED | OUTPATIENT
Start: 2020-11-02 | End: 2020-11-02 | Stop reason: HOSPADM

## 2020-11-01 RX ORDER — IBUPROFEN 600 MG/1
600 TABLET ORAL EVERY 8 HOURS PRN
Status: DISCONTINUED | OUTPATIENT
Start: 2020-11-02 | End: 2020-11-02 | Stop reason: HOSPADM

## 2020-11-01 RX ORDER — ONDANSETRON 2 MG/ML
4 INJECTION INTRAMUSCULAR; INTRAVENOUS EVERY 8 HOURS PRN
Status: DISCONTINUED | OUTPATIENT
Start: 2020-11-01 | End: 2020-11-02 | Stop reason: HOSPADM

## 2020-11-01 RX ORDER — IBUPROFEN 200 MG
16 TABLET ORAL
Status: DISCONTINUED | OUTPATIENT
Start: 2020-11-01 | End: 2020-11-02 | Stop reason: HOSPADM

## 2020-11-01 RX ORDER — TALC
6 POWDER (GRAM) TOPICAL NIGHTLY PRN
Status: DISCONTINUED | OUTPATIENT
Start: 2020-11-01 | End: 2020-11-02 | Stop reason: HOSPADM

## 2020-11-01 RX ORDER — MAG HYDROX/ALUMINUM HYD/SIMETH 200-200-20
30 SUSPENSION, ORAL (FINAL DOSE FORM) ORAL
Status: COMPLETED | OUTPATIENT
Start: 2020-11-01 | End: 2020-11-01

## 2020-11-01 RX ORDER — CARVEDILOL 25 MG/1
25 TABLET ORAL 2 TIMES DAILY
Status: DISCONTINUED | OUTPATIENT
Start: 2020-11-01 | End: 2020-11-02 | Stop reason: HOSPADM

## 2020-11-01 RX ORDER — SERTRALINE HYDROCHLORIDE 25 MG/1
25 TABLET, FILM COATED ORAL DAILY
Status: DISCONTINUED | OUTPATIENT
Start: 2020-11-02 | End: 2020-11-02 | Stop reason: HOSPADM

## 2020-11-01 RX ORDER — ROSUVASTATIN CALCIUM 20 MG/1
40 TABLET, COATED ORAL NIGHTLY
Status: DISCONTINUED | OUTPATIENT
Start: 2020-11-01 | End: 2020-11-02 | Stop reason: HOSPADM

## 2020-11-01 RX ORDER — PANTOPRAZOLE SODIUM 40 MG/1
40 TABLET, DELAYED RELEASE ORAL DAILY
Status: DISCONTINUED | OUTPATIENT
Start: 2020-11-02 | End: 2020-11-02 | Stop reason: HOSPADM

## 2020-11-01 RX ORDER — FUROSEMIDE 20 MG/1
20 TABLET ORAL DAILY
Status: DISCONTINUED | OUTPATIENT
Start: 2020-11-02 | End: 2020-11-01

## 2020-11-01 RX ORDER — SODIUM CHLORIDE 0.9 % (FLUSH) 0.9 %
10 SYRINGE (ML) INJECTION
Status: DISCONTINUED | OUTPATIENT
Start: 2020-11-01 | End: 2020-11-02 | Stop reason: HOSPADM

## 2020-11-01 RX ORDER — POLYETHYLENE GLYCOL 3350 17 G/17G
17 POWDER, FOR SOLUTION ORAL DAILY
Status: DISCONTINUED | OUTPATIENT
Start: 2020-11-02 | End: 2020-11-02 | Stop reason: HOSPADM

## 2020-11-01 RX ORDER — NITROGLYCERIN 0.4 MG/1
0.4 TABLET SUBLINGUAL
Status: COMPLETED | OUTPATIENT
Start: 2020-11-01 | End: 2020-11-01

## 2020-11-01 RX ORDER — GABAPENTIN 300 MG/1
900 CAPSULE ORAL 3 TIMES DAILY
Status: DISCONTINUED | OUTPATIENT
Start: 2020-11-02 | End: 2020-11-02 | Stop reason: HOSPADM

## 2020-11-01 RX ORDER — TIAGABINE HYDROCHLORIDE 4 MG/1
4 TABLET, FILM COATED ORAL NIGHTLY
Status: DISCONTINUED | OUTPATIENT
Start: 2020-11-01 | End: 2020-11-02 | Stop reason: HOSPADM

## 2020-11-01 RX ORDER — HYDRALAZINE HYDROCHLORIDE 10 MG/1
10 TABLET, FILM COATED ORAL EVERY 8 HOURS PRN
Status: DISCONTINUED | OUTPATIENT
Start: 2020-11-02 | End: 2020-11-02 | Stop reason: HOSPADM

## 2020-11-01 RX ORDER — ACETAMINOPHEN 325 MG/1
650 TABLET ORAL EVERY 8 HOURS PRN
Status: DISCONTINUED | OUTPATIENT
Start: 2020-11-01 | End: 2020-11-02 | Stop reason: HOSPADM

## 2020-11-01 RX ORDER — BISACODYL 10 MG
10 SUPPOSITORY, RECTAL RECTAL DAILY PRN
Status: DISCONTINUED | OUTPATIENT
Start: 2020-11-01 | End: 2020-11-02 | Stop reason: HOSPADM

## 2020-11-01 RX ORDER — LOSARTAN POTASSIUM 50 MG/1
100 TABLET ORAL DAILY
Status: DISCONTINUED | OUTPATIENT
Start: 2020-11-02 | End: 2020-11-02 | Stop reason: HOSPADM

## 2020-11-01 RX ORDER — ONDANSETRON 8 MG/1
8 TABLET, ORALLY DISINTEGRATING ORAL EVERY 8 HOURS PRN
Status: DISCONTINUED | OUTPATIENT
Start: 2020-11-01 | End: 2020-11-02 | Stop reason: HOSPADM

## 2020-11-01 RX ORDER — GLUCAGON 1 MG
1 KIT INJECTION
Status: DISCONTINUED | OUTPATIENT
Start: 2020-11-01 | End: 2020-11-02 | Stop reason: HOSPADM

## 2020-11-01 RX ORDER — CARVEDILOL 25 MG/1
25 TABLET ORAL 2 TIMES DAILY WITH MEALS
Status: DISCONTINUED | OUTPATIENT
Start: 2020-11-02 | End: 2020-11-01

## 2020-11-01 RX ORDER — IBUPROFEN 200 MG
24 TABLET ORAL
Status: DISCONTINUED | OUTPATIENT
Start: 2020-11-01 | End: 2020-11-02 | Stop reason: HOSPADM

## 2020-11-01 RX ORDER — ARIPIPRAZOLE 5 MG/1
15 TABLET ORAL DAILY
Status: DISCONTINUED | OUTPATIENT
Start: 2020-11-02 | End: 2020-11-02 | Stop reason: HOSPADM

## 2020-11-01 RX ORDER — DONEPEZIL HYDROCHLORIDE 5 MG/1
10 TABLET, FILM COATED ORAL 2 TIMES DAILY
Status: DISCONTINUED | OUTPATIENT
Start: 2020-11-01 | End: 2020-11-02 | Stop reason: HOSPADM

## 2020-11-01 RX ADMIN — LIDOCAINE HYDROCHLORIDE 10 ML: 20 SOLUTION ORAL; TOPICAL at 08:11

## 2020-11-01 RX ADMIN — ALUMINUM HYDROXIDE, MAGNESIUM HYDROXIDE, AND SIMETHICONE 30 ML: 200; 200; 20 SUSPENSION ORAL at 08:11

## 2020-11-01 RX ADMIN — NITROGLYCERIN 0.4 MG: 0.4 TABLET SUBLINGUAL at 07:11

## 2020-11-01 RX ADMIN — MORPHINE SULFATE 4 MG: 4 INJECTION INTRAVENOUS at 07:11

## 2020-11-02 ENCOUNTER — TELEPHONE (OUTPATIENT)
Dept: NEUROLOGY | Facility: CLINIC | Age: 55
End: 2020-11-02

## 2020-11-02 VITALS
OXYGEN SATURATION: 97 % | HEART RATE: 60 BPM | RESPIRATION RATE: 20 BRPM | BODY MASS INDEX: 50.02 KG/M2 | HEIGHT: 64 IN | TEMPERATURE: 96 F | WEIGHT: 293 LBS | SYSTOLIC BLOOD PRESSURE: 116 MMHG | DIASTOLIC BLOOD PRESSURE: 63 MMHG

## 2020-11-02 LAB
ALBUMIN SERPL BCP-MCNC: 2.9 G/DL (ref 3.5–5.2)
ALP SERPL-CCNC: 55 U/L (ref 55–135)
ALT SERPL W/O P-5'-P-CCNC: 20 U/L (ref 10–44)
ANION GAP SERPL CALC-SCNC: 6 MMOL/L (ref 8–16)
AST SERPL-CCNC: 18 U/L (ref 10–40)
BASOPHILS # BLD AUTO: 0 K/UL (ref 0–0.2)
BASOPHILS NFR BLD: 0 % (ref 0–1.9)
BILIRUB SERPL-MCNC: 0.3 MG/DL (ref 0.1–1)
BUN SERPL-MCNC: 8 MG/DL (ref 6–20)
CALCIUM SERPL-MCNC: 9.1 MG/DL (ref 8.7–10.5)
CHLORIDE SERPL-SCNC: 104 MMOL/L (ref 95–110)
CO2 SERPL-SCNC: 24 MMOL/L (ref 23–29)
CREAT SERPL-MCNC: 0.9 MG/DL (ref 0.5–1.4)
DIFFERENTIAL METHOD: ABNORMAL
EOSINOPHIL # BLD AUTO: 0 K/UL (ref 0–0.5)
EOSINOPHIL NFR BLD: 0 % (ref 0–8)
ERYTHROCYTE [DISTWIDTH] IN BLOOD BY AUTOMATED COUNT: 15.9 % (ref 11.5–14.5)
EST. GFR  (AFRICAN AMERICAN): >60 ML/MIN/1.73 M^2
EST. GFR  (NON AFRICAN AMERICAN): >60 ML/MIN/1.73 M^2
ESTIMATED AVG GLUCOSE: 128 MG/DL (ref 68–131)
GLUCOSE SERPL-MCNC: 123 MG/DL (ref 70–110)
HBA1C MFR BLD HPLC: 6.1 % (ref 4–5.6)
HCT VFR BLD AUTO: 33.3 % (ref 37–48.5)
HGB BLD-MCNC: 10.8 G/DL (ref 12–16)
IMM GRANULOCYTES # BLD AUTO: 0 K/UL (ref 0–0.04)
IMM GRANULOCYTES NFR BLD AUTO: 0 % (ref 0–0.5)
LYMPHOCYTES # BLD AUTO: 1.2 K/UL (ref 1–4.8)
LYMPHOCYTES NFR BLD: 41.3 % (ref 18–48)
MAGNESIUM SERPL-MCNC: 1.7 MG/DL (ref 1.6–2.6)
MAGNESIUM SERPL-MCNC: 2 MG/DL (ref 1.6–2.6)
MCH RBC QN AUTO: 30.3 PG (ref 27–31)
MCHC RBC AUTO-ENTMCNC: 32.4 G/DL (ref 32–36)
MCV RBC AUTO: 94 FL (ref 82–98)
MONOCYTES # BLD AUTO: 0.3 K/UL (ref 0.3–1)
MONOCYTES NFR BLD: 11.3 % (ref 4–15)
NEUTROPHILS # BLD AUTO: 1.4 K/UL (ref 1.8–7.7)
NEUTROPHILS NFR BLD: 47.4 % (ref 38–73)
NRBC BLD-RTO: 0 /100 WBC
PHOSPHATE SERPL-MCNC: 3.6 MG/DL (ref 2.7–4.5)
PLATELET # BLD AUTO: 172 K/UL (ref 150–350)
PMV BLD AUTO: 11.7 FL (ref 9.2–12.9)
POTASSIUM SERPL-SCNC: 3.4 MMOL/L (ref 3.5–5.1)
PROT SERPL-MCNC: 8.6 G/DL (ref 6–8.4)
RBC # BLD AUTO: 3.56 M/UL (ref 4–5.4)
SODIUM SERPL-SCNC: 134 MMOL/L (ref 136–145)
TROPONIN I SERPL DL<=0.01 NG/ML-MCNC: 0.01 NG/ML (ref 0–0.03)
TROPONIN I SERPL DL<=0.01 NG/ML-MCNC: <0.006 NG/ML (ref 0–0.03)
TROPONIN I SERPL DL<=0.01 NG/ML-MCNC: <0.006 NG/ML (ref 0–0.03)
WBC # BLD AUTO: 3 K/UL (ref 3.9–12.7)

## 2020-11-02 PROCEDURE — G0378 HOSPITAL OBSERVATION PER HR: HCPCS

## 2020-11-02 PROCEDURE — 83036 HEMOGLOBIN GLYCOSYLATED A1C: CPT

## 2020-11-02 PROCEDURE — 96375 TX/PRO/DX INJ NEW DRUG ADDON: CPT

## 2020-11-02 PROCEDURE — 85025 COMPLETE CBC W/AUTO DIFF WBC: CPT

## 2020-11-02 PROCEDURE — 84484 ASSAY OF TROPONIN QUANT: CPT

## 2020-11-02 PROCEDURE — 80053 COMPREHEN METABOLIC PANEL: CPT

## 2020-11-02 PROCEDURE — 25000003 PHARM REV CODE 250: Performed by: PHYSICIAN ASSISTANT

## 2020-11-02 PROCEDURE — 84100 ASSAY OF PHOSPHORUS: CPT

## 2020-11-02 PROCEDURE — 63600175 PHARM REV CODE 636 W HCPCS: Performed by: NURSE PRACTITIONER

## 2020-11-02 PROCEDURE — 83735 ASSAY OF MAGNESIUM: CPT

## 2020-11-02 PROCEDURE — 99217 PR OBSERVATION CARE DISCHARGE: CPT | Mod: ,,, | Performed by: PHYSICIAN ASSISTANT

## 2020-11-02 PROCEDURE — 36415 COLL VENOUS BLD VENIPUNCTURE: CPT

## 2020-11-02 PROCEDURE — 84484 ASSAY OF TROPONIN QUANT: CPT | Mod: 91

## 2020-11-02 PROCEDURE — 83735 ASSAY OF MAGNESIUM: CPT | Mod: 91

## 2020-11-02 PROCEDURE — 25000003 PHARM REV CODE 250: Performed by: NURSE PRACTITIONER

## 2020-11-02 PROCEDURE — 99217 PR OBSERVATION CARE DISCHARGE: ICD-10-PCS | Mod: ,,, | Performed by: PHYSICIAN ASSISTANT

## 2020-11-02 RX ADMIN — KETOROLAC TROMETHAMINE 30 MG: 30 INJECTION, SOLUTION INTRAMUSCULAR; INTRAVENOUS at 01:11

## 2020-11-02 RX ADMIN — POTASSIUM BICARBONATE 50 MEQ: 978 TABLET, EFFERVESCENT ORAL at 08:11

## 2020-11-02 RX ADMIN — IBUPROFEN 600 MG: 600 TABLET, FILM COATED ORAL at 08:11

## 2020-11-02 RX ADMIN — SERTRALINE HYDROCHLORIDE 25 MG: 25 TABLET ORAL at 08:11

## 2020-11-02 RX ADMIN — POLYETHYLENE GLYCOL 3350 17 G: 17 POWDER, FOR SOLUTION ORAL at 08:11

## 2020-11-02 RX ADMIN — DONEPEZIL HYDROCHLORIDE 10 MG: 5 TABLET, FILM COATED ORAL at 08:11

## 2020-11-02 RX ADMIN — ACETAMINOPHEN 650 MG: 325 TABLET ORAL at 12:11

## 2020-11-02 RX ADMIN — ARIPIPRAZOLE 15 MG: 5 TABLET ORAL at 08:11

## 2020-11-02 RX ADMIN — LOSARTAN POTASSIUM 100 MG: 50 TABLET, FILM COATED ORAL at 08:11

## 2020-11-02 RX ADMIN — CARVEDILOL 25 MG: 25 TABLET, FILM COATED ORAL at 08:11

## 2020-11-02 RX ADMIN — GABAPENTIN 900 MG: 300 CAPSULE ORAL at 08:11

## 2020-11-02 RX ADMIN — MELATONIN TAB 3 MG 6 MG: 3 TAB at 12:11

## 2020-11-02 RX ADMIN — MAGNESIUM SULFATE HEPTAHYDRATE 1 G: 500 INJECTION, SOLUTION INTRAMUSCULAR; INTRAVENOUS at 02:11

## 2020-11-02 RX ADMIN — CARVEDILOL 25 MG: 25 TABLET, FILM COATED ORAL at 12:11

## 2020-11-02 RX ADMIN — CLOPIDOGREL 75 MG: 75 TABLET, FILM COATED ORAL at 08:11

## 2020-11-02 RX ADMIN — PANTOPRAZOLE SODIUM 40 MG: 40 TABLET, DELAYED RELEASE ORAL at 08:11

## 2020-11-02 RX ADMIN — GABAPENTIN 900 MG: 300 CAPSULE ORAL at 03:11

## 2020-11-02 NOTE — HOSPITAL COURSE
Ms. Chawla was admitted to observation for left-sided chest pain w/ ACS risk stratification. Intake labs unremarkable. Troponin negative x3. EKG paced without evidence of ischemic changes. BNP wnl. CXR negative. Given NTG & morphine with resolution of symptoms. No further episodes of chest pain throughout admission. Remained HDS. Patient medically stable for discharge home. Plan for outpt [facility] as previously scheduled. Ambulatory referral to cardiology to establish care. Patient verbalized understanding. All questions and concerns addressed.

## 2020-11-02 NOTE — ED PROVIDER NOTES
Encounter Date: 11/1/2020       History     Chief Complaint   Patient presents with    Chest Pain     midsternal that radaites to left arm and shoulder. pacemaker in place. 1 inch of Nitro paste and 3 SL Nitro given by EMS.      HPI   55-year-old female with past medical history of cardiac arrest 2013 status post pacemaker, seizures, stroke, hypertension, coming in with chest pain starting 1.5 hr prior to presentation, it is described as sharp left-sided radiating to her left arm.  She endorses associated nausea.  Pain was initially 10/10, decreased to now 10 with nitro and nitro paste.  No shortness of breath.  No similar symptoms in the past.  Patient reports that she is on Plavix and Xarelto with which she is compliant.  No abdominal pain.  No cough.  No lower extremity swelling or pain.    She is allergic to ASA.    Review of patient's allergies indicates:   Allergen Reactions    Aspirin Hives    Imitrex [sumatriptan] Palpitations    Penicillins Hives and Swelling    Shellfish containing products Anaphylaxis     seafood    Percocet [oxycodone-acetaminophen] Itching     States can take Hydrocodone    Reglan [metoclopramide hcl] Other (See Comments)     Parkinsonism      Past Medical History:   Diagnosis Date    Anticoagulant long-term use     Anxiety     Asthma     Atrial fibrillation     Brain anoxic injury     Cervicalgia 8/28/2014    CHI (closed head injury) 2/19/2013    Convulsion 5/30/2015    Decreased ROM of left shoulder 4/12/2017    Defibrillator activation 2013    Depression     Heart block     History of sudden cardiac arrest 2/2013    PEA arrest with subsequent long-QT    Hx of psychiatric care     Hypertension     Left atrial enlargement 4/11/2018    Pacemaker 2013    Paresthesia 11/1/2013    Prolonged Q-T interval on ECG 2/8/2013    Psychiatric problem     Seizures     Sleep difficulties     Stroke     weakness lt side    Therapy     Thyroid disease     Upper airway  resistance syndrome 2/21/2017     Past Surgical History:   Procedure Laterality Date    breast reduction      CARDIAC DEFIBRILLATOR PLACEMENT      CARDIAC DEFIBRILLATOR PLACEMENT      CARPAL TUNNEL RELEASE Right     COLONOSCOPY N/A 5/7/2019    Procedure: COLONOSCOPY;  Surgeon: Juan Coffey MD;  Location: [facility] ([address] [place] [address]);  Service: Endoscopy;  Laterality: N/A;  per DR. Bustamante Pt to have balloon with DR. Coffey due to excesive looping, could not reach cecum - see telephone encounter 3/8/19 and last procedure report dated 6/24/14/ Per Piedad schedule as 90 min case- [facility]  pacemaker/AICD [employer]ron[employer]/   per , ok to hold Xareltox 2 days    HERNIA REPAIR      HIATAL HERNIA REPAIR      INSERT / REPLACE / REMOVE PACEMAKER  10/2017    CRT-D upgrade    TOTAL REDUCTION MAMMOPLASTY      TRIGGER FINGER RELEASE Left 8/2/2019    Procedure: RELEASE, TRIGGER FINGER left middle;  Surgeon: Matt Pugh Jr., MD;  Location: [facility];  Service: Plastics;  Laterality: Left;    TRIGGER FINGER RELEASE Right 2/11/2020    Procedure: RELEASE, TRIGGER FINGER middle;  Surgeon: Matt Pugh Jr., MD;  Location: [facility];  Service: Plastics;  Laterality: Right;    TUBAL LIGATION       Family History   Problem Relation Age of Onset    Hypertension Mother     COPD Unknown     Heart failure Unknown     Glaucoma Maternal Grandmother     Glaucoma Paternal Grandmother      Social History     Tobacco Use    Smoking status: Never Smoker    Smokeless tobacco: Never Used   Substance Use Topics    Alcohol use: No     Frequency: 2-4 times a month     Drinks per session: 1 or 2     Binge frequency: Never    Drug use: No     Review of Systems  Constitutional:  No Fever, No Chills,   Eyes: No Vision Changes  ENT/Mouth: No sore throat, No rhinorrhea  Cardiovascular:  No Chest Pain, No Palpitations  Respiratory:  No Cough, No SOB  Gastrointestinal:  No Nausea, No Vomiting, No Diarrhea, No abdo pain.  Genitourinary:  No   pain, No dysuria   Musculoskeletal:  No Arthralgias, No Back Pain, No Neck Pain, No recent trauma.  Skin:  No skin Lesions  Neuro:  No Weakness, No Numbness, No Paresthesias, No Dizziness, No Headache        Physical Exam     Initial Vitals [11/01/20 1820]   BP Pulse Resp Temp SpO2   (!) 166/102 95 17 98.6 °F (37 °C) 98 %      MAP       --         Physical Exam  Physical Exam:  CONSTITUTIONAL: Well developed, well nourished, in no acute distress.  HENT: Normocephalic, atraumatic    EYES: Sclerae anicteric   NECK: Supple, no thyroid enlargement  CARDIOVASCULAR: Regular rate and rhythm without any murmurs, gallops, rubs.  No lower extremity swelling or tenderness to palpation.  RESPIRATORY: Speaking in full sentences. Breathing comfortably. Auscultation of the lungs revealed normal breath sounds b/l, no wheezing, no rales, no rhonchi.  ABDOMEN: Soft and nontender, no masses, no rebound or guarding   NEUROLOGIC: Alert, interacting normally. No facial droop.   MSK:  There is some reproducible mid sternum chest wall tenderness to palpation (however patient states is not the pain she is feeling.) Moving all four extremities.  Skin: Warm and dry. No visible rash on exposed areas of skin.    Psych: Mood and affect normal.       ED Course   Procedures  Labs Reviewed   CBC W/ AUTO DIFFERENTIAL - Abnormal; Notable for the following components:       Result Value    RBC 3.76 (*)     Hemoglobin 11.6 (*)     Hematocrit 33.7 (*)     RDW 17.1 (*)     MPV 13.8 (*)     Gran # (ANC) 1.7 (*)     All other components within normal limits   COMPREHENSIVE METABOLIC PANEL - Abnormal; Notable for the following components:    Sodium 135 (*)     CO2 22 (*)     Calcium 8.3 (*)     Total Protein 8.8 (*)     Albumin 3.0 (*)     Alkaline Phosphatase 51 (*)     All other components within normal limits   TROPONIN I   TROPONIN I   B-TYPE NATRIURETIC PEPTIDE   TROPONIN I   SARS-COV-2 RDRP GENE     EKG Readings: (Independently Interpreted)    Ventricularly paced at 87.  No obvious signs of scarbosa criteria.       Imaging Results          X-Ray Chest AP Portable (Final result)  Result time 11/01/20 19:24:47    Final result by James Rosales MD (11/01/20 19:24:47)                 Impression:      1. Interstitial findings are likely related to shallow inspiratory effort and habitus, no large focal consolidation.      Electronically signed by: James Rosales MD  Date:    11/01/2020  Time:    19:24             Narrative:    EXAMINATION:  XR CHEST AP PORTABLE    CLINICAL HISTORY:  Chest Pain;    TECHNIQUE:  Single frontal view of the chest was performed.    COMPARISON:  09/14/2020    FINDINGS:  Left chest wall pacer noted.  The cardiomediastinal silhouette is not enlarged.  There is no pleural effusion.  The trachea is midline.  The lungs are symmetrically expanded bilaterally with mildly coarse interstitial attenuation, accentuated by shallow inspiratory effort..  No large focal consolidation seen.  There is no pneumothorax.  The osseous structures are remarkable for degenerative changes..                                 Medical Decision Making:   History:   Old Medical Records: I decided to obtain old medical records.    55-year-old female with past medical history as noted coming in with left-sided chest pain radiating to her left arm with associated nausea.  She is questionably mildly diaphoretic on exam.  She is comfortable however.  She was given 3 sublingual nitro, pain improving from a 10 to an 8.  Her exam is otherwise unremarkable with no lower extremity findings.    Differential includes ACS. There is some reproducible chest wall tenderness however given her left arm symptoms, the fact that she states that the reproduction is not consistent with her pain not likely to be chest wall related.  No signs or symptoms of pneumonia.   Not consistent with PE, no clinical signs and pt is on blood thinners.    EKG is paced thus making it significantly  difficult to detect ischemia. No obvious scarbosa.    Will give dose of morphine and reassess, if she continues to have significant pain may need to get Cardiology involved emergently.    Will undertake chest x-ray to look for any pulmonary abnormalities.  Cardiac workup including troponins, risk stratify for ACS.  BNP.    Disposition based on ED course and patient's symptomatology.  Anticipate at least opt for chest pain with risk stratification.    Update:  After morphine patient pain is 5/10.  Labs are benign. Initial trop is negative.   Discussed with Cards. Given negative trop, they are not impressed with story. Recommend Obs for ACS risk stratification and attempt GI cocktail.     Update:  Pt pain is now resolved. She states it actually resolved before GI cocktail was given.  Vitals benign. Pending Second trop. Will Obs for ACS, further risk stratification, monitoring.  Pt agrees to stay overnight.     MDM Complexity Points:   Problem Points:  1.New problem, with no additional ED work-up planned (maximum of 1) -    Data Points:  Review or order clinical lab tests, Review or order radiology test, Review or order medicine test (PFTs, EKG, cardiac echo or catheterization), Independent review of image, tracing, or specimen, Decision to obtain old records (in the EHR), Review and summarization of old records and Discuss test with performing physician/consulting physician - discussed with hospital medicine.                                   Clinical Impression:       ICD-10-CM ICD-9-CM   1. Chest pain  R07.9 786.50                          ED Disposition Condition    Observation                             Devyn Luciano MD  11/01/20 [id number]

## 2020-11-02 NOTE — ASSESSMENT & PLAN NOTE
-Patient c/o HA that she associates w/receiving NTG.  -Toradol 30mg and Magnesium 1g IV once  -The patient's home migraine meds are not formulary at this facility.  -Ibuprofen 600 mg Q8h prn HA

## 2020-11-02 NOTE — HPI
Amna Chawla is a 55 y.o. female with a significant medical history of Afib (on Xarelto), HTN, seizures, prior stroke (residual left sided weakness), asthma, thyroid disease, s/p pacemaker placement, anxiety/depression who presents to the hospital complaining of chest pain.  The patient's chest pain began approximately 1.5 hours prior to presenting to the ED.  She described the pain as sharp, left sided, and radiating to her left arm.  The patient endorsed nausea with the pain but denied SOB.  EMS was activated.  Her chest pain was 10/10 at that time.  Nitropaste was placed and the patient was transported to the ED.   ED workup included labs that were grossly normal for the patient.  Troponin 0.007.  Currently the patient is without c/o chest pain.  C/o HA that she associates w/receiving NTG.

## 2020-11-02 NOTE — ASSESSMENT & PLAN NOTE
-Hx of Glucophage prescription, currently not taking.  -A1c 6.1  - further eval & management per PCP

## 2020-11-02 NOTE — ASSESSMENT & PLAN NOTE
Amna Chawla is a 55 y.o. female with a significant medical history of Afib (on Xarelto), HTN, seizures, prior stroke (residual left sided weakness), asthma, thyroid disease, s/p pacemaker placement, anxiety/depression who presents to the hospital complaining of chest pain.  NTG paste was placed prior to her arrival to the ED.  She was given Morphine in the ED which helped her pain.  CP currently 0/10.    -Troponin 0.007, trend  -Cardiac diet  -Continue statin and HTN meds

## 2020-11-02 NOTE — SUBJECTIVE & OBJECTIVE
Past Medical History:   Diagnosis Date    Anticoagulant long-term use     Anxiety     Asthma     Atrial fibrillation     Brain anoxic injury     Cervicalgia 8/28/2014    CHI (closed head injury) 2/19/2013    Convulsion 5/30/2015    Decreased ROM of left shoulder 4/12/2017    Defibrillator activation 2013    Depression     Heart block     History of sudden cardiac arrest 2/2013    PEA arrest with subsequent long-QT    Hx of psychiatric care     Hypertension     Left atrial enlargement 4/11/2018    Pacemaker 2013    Paresthesia 11/1/2013    Prolonged Q-T interval on ECG 2/8/2013    Psychiatric problem     Seizures     Sleep difficulties     Stroke     weakness lt side    Therapy     Thyroid disease     Upper airway resistance syndrome 2/21/2017       Past Surgical History:   Procedure Laterality Date    breast reduction      CARDIAC DEFIBRILLATOR PLACEMENT      CARDIAC DEFIBRILLATOR PLACEMENT      CARPAL TUNNEL RELEASE Right     COLONOSCOPY N/A 5/7/2019    Procedure: COLONOSCOPY;  Surgeon: Juan Coffey MD;  Location: [facility] ([address] [place] [address]);  Service: Endoscopy;  Laterality: N/A;  per DR. Bustamante Pt to have balloon with DR. Coffey due to excesive looping, could not reach cecum - see telephone encounter 3/8/19 and last procedure report dated 6/24/14/ Per Piedad schedule as 90 min case- ERW  pacemaker/AICD Boitronik/   per , ok to hold Xareltox 2 days    HERNIA REPAIR      HIATAL HERNIA REPAIR      INSERT / REPLACE / REMOVE PACEMAKER  10/2017    CRT-D upgrade    TOTAL REDUCTION MAMMOPLASTY      TRIGGER FINGER RELEASE Left 8/2/2019    Procedure: RELEASE, TRIGGER FINGER left middle;  Surgeon: Matt Pugh Jr., MD;  Location: [facility];  Service: Plastics;  Laterality: Left;    TRIGGER FINGER RELEASE Right 2/11/2020    Procedure: RELEASE, TRIGGER FINGER middle;  Surgeon: Matt Pugh Jr., MD;  Location: [facility];  Service: Plastics;  Laterality: Right;    TUBAL LIGATION          Review of patient's allergies indicates:   Allergen Reactions    Aspirin Hives    Imitrex [sumatriptan] Palpitations    Penicillins Hives and Swelling    Shellfish containing products Anaphylaxis     seafood    Percocet [oxycodone-acetaminophen] Itching     States can take Hydrocodone    Reglan [metoclopramide hcl] Other (See Comments)     Parkinsonism        Current Facility-Administered Medications on File Prior to Encounter   Medication    lactated ringers infusion    lidocaine (PF) 10 mg/ml (1%) injection 5 mg     Current Outpatient Medications on File Prior to Encounter   Medication Sig    ARIPiprazole (ABILIFY) 15 MG Tab Take 1 tablet (15 mg total) by mouth once daily.    carvedilol (COREG) 25 MG tablet TAKE 1 TABLET BY MOUTH TWICE DAILY, WITH MEALS    cimetidine (TAGAMET) 300 MG tablet Take 300 mg 13 hours prior, 7 hours prior, and 1 hour prior to procedure    clopidogreL (PLAVIX) 75 mg tablet Take 75 mg by mouth once daily.    cyanocobalamin, vitamin B-12, 1,000 mcg Subl Place 2,000 mcg under the tongue once daily.    donepezil (ARICEPT) 10 MG tablet Take 1 tablet (10 mg total) by mouth 2 (two) times daily.    ergocalciferol (ERGOCALCIFEROL) 50,000 unit Cap Take 1 capsule (50,000 Units total) by mouth twice a week.    furosemide (LASIX) 20 MG tablet Take 1 tablet (20 mg total) by mouth once daily.    gabapentin (NEURONTIN) 300 MG capsule Take 3 capsules (900 mg total) by mouth 3 (three) times daily.    losartan (COZAAR) 100 MG tablet Take 1 tablet (100 mg total) by mouth once daily.    pantoprazole (PROTONIX) 40 MG tablet Take 1 tablet (40 mg total) by mouth once daily.    rosuvastatin (CRESTOR) 40 MG Tab Take 1 tablet (40 mg total) by mouth every evening.    sertraline (ZOLOFT) 25 MG tablet Take 1 tablet (25 mg total) by mouth once daily.    tiaGABine (GABITRIL) 4 MG tablet Take 1 tablet (4 mg total) by mouth every evening.    XARELTO 20 mg Tab TAKE 1 TABLET BY MOUTH DAILY WITH  DINNER OR EVENING MEAL    zolpidem (AMBIEN) 10 mg Tab Take 1 tablet (10 mg total) by mouth nightly as needed.    blood sugar diagnostic Strp 1 strip by Misc.(Non-Drug; Combo Route) route 2 (two) times daily.    blood-glucose meter kit Please provide with insurance covered meter    clonazePAM (KLONOPIN) 1 MG tablet Take 1 tablet (1 mg total) by mouth daily as needed for Anxiety.    diphenhydrAMINE (BENADRYL) 25 mg capsule Take 2 capsules (50 mg total) by mouth as needed for Itching (Take 50 mg 13 hours prior, 7 hours prior, and 1 hour prior to procedure).    flurbiprofen (ANSAID) 100 MG tablet Take 1 tablet (100 mg total) by mouth 2 (two) times daily as needed (migraine).    fremanezumab-vfrm (AJOVY) 225 mg/1.5 mL injection Inject 1.5 mLs (225 mg total) into the skin every 28 days.    lancets Misc 1 Device by Misc.(Non-Drug; Combo Route) route 2 (two) times daily.    lidocaine (LIDODERM) 5 % Place 1 patch onto the skin once daily. Remove & Discard patch within 12 hours or as directed by MD    magnesium 200 mg Tab Take 200 mg by mouth once daily. (Patient taking differently: Take 200 mg by mouth every other day. )    metFORMIN (GLUCOPHAGE) 1000 MG tablet Take 1 tablet (1,000 mg total) by mouth 2 (two) times daily with meals. for 7 days    mupirocin (BACTROBAN) 2 % ointment Apply to affected area 3 times daily    ondansetron (ZOFRAN-ODT) 4 MG TbDL Take 1 tablet (4 mg total) by mouth every 12 (twelve) hours as needed (nausea).    silver sulfADIAZINE 1% (SILVADENE) 1 % cream Apply topically 2 (two) times daily.    TRUE METRIX GLUCOSE METER Misc TEST BG BID    ubrogepant (UBRELVY)tablet 100 mg Take 1 tablet (100 mg total) by mouth 2 (two) times daily as needed for Migraine.     Family History     Problem Relation (Age of Onset)    COPD     Glaucoma Maternal Grandmother, Paternal Grandmother    Heart failure     Hypertension Mother        Tobacco Use    Smoking status: Never Smoker    Smokeless tobacco:  Never Used   Substance and Sexual Activity    Alcohol use: No     Frequency: 2-4 times a month     Drinks per session: 1 or 2     Binge frequency: Never    Drug use: No    Sexual activity: Not Currently     Review of Systems   Constitutional: Negative for chills, fatigue and fever.   HENT: Negative for drooling and trouble swallowing.    Eyes: Negative for photophobia, pain, discharge and visual disturbance.   Respiratory: Negative for cough, chest tightness, shortness of breath and wheezing.    Cardiovascular: Positive for chest pain (currently resolved). Negative for palpitations and leg swelling.   Gastrointestinal: Positive for nausea (currently resolved). Negative for abdominal pain, constipation, diarrhea and vomiting.   Endocrine: Negative for cold intolerance and heat intolerance.   Genitourinary: Negative for dysuria, frequency, hematuria and urgency.   Musculoskeletal: Negative for gait problem, neck pain and neck stiffness.   Skin: Negative for rash and wound.   Allergic/Immunologic: Negative for environmental allergies and food allergies.   Neurological: Positive for headaches. Negative for dizziness, tremors, speech difficulty, weakness, light-headedness and numbness.   Hematological: Negative for adenopathy. Does not bruise/bleed easily.   Psychiatric/Behavioral: Negative for agitation, confusion and hallucinations.     Objective:     Vital Signs (Most Recent):  Temp: 98.1 °F (36.7 °C) (11/01/20 2257)  Pulse: 71 (11/01/20 2300)  Resp: 18 (11/01/20 2257)  BP: (!) 191/96 (11/01/20 2257)  SpO2: 97 % (11/01/20 2257) Vital Signs (24h Range):  Temp:  [98.1 °F (36.7 °C)-98.6 °F (37 °C)] 98.1 °F (36.7 °C)  Pulse:  [62-95] 71  Resp:  [17-20] 18  SpO2:  [96 %-98 %] 97 %  BP: (156-191)/() 191/96     Weight: (!) 140.6 kg (310 lb)  Body mass index is 53.21 kg/m².    Physical Exam  Vitals signs and nursing note reviewed.   Constitutional:       General: She is not in acute distress.     Appearance: She is  well-developed. She is not diaphoretic.   HENT:      Head: Normocephalic and atraumatic.      Right Ear: External ear normal.      Left Ear: External ear normal.      Nose: Nose normal.      Mouth/Throat:      Mouth: Mucous membranes are moist.   Eyes:      General: No scleral icterus.        Right eye: No discharge.         Left eye: No discharge.      Extraocular Movements: Extraocular movements intact.      Conjunctiva/sclera: Conjunctivae normal.      Pupils: Pupils are equal, round, and reactive to light.   Neck:      Musculoskeletal: Normal range of motion and neck supple.   Cardiovascular:      Rate and Rhythm: Normal rate and regular rhythm.      Heart sounds: Normal heart sounds. No murmur.   Pulmonary:      Effort: Pulmonary effort is normal. No respiratory distress.      Breath sounds: Normal breath sounds.   Abdominal:      General: Bowel sounds are normal. There is no distension.      Palpations: Abdomen is soft.      Tenderness: There is no abdominal tenderness.   Skin:     General: Skin is warm and dry.      Capillary Refill: Capillary refill takes less than 2 seconds.   Neurological:      Mental Status: She is alert and oriented to person, place, and time.   Psychiatric:         Mood and Affect: Affect is blunt.         Behavior: Behavior normal. Behavior is cooperative.         Thought Content: Thought content normal.           CRANIAL NERVES     CN III, IV, VI   Pupils are equal, round, and reactive to light.       Significant Labs:   A1C:   Recent Labs   Lab 06/12/20  0720   HGBA1C 6.2*     BMP:   Recent Labs   Lab 11/01/20 2011   GLU 92   *   K 3.7      CO2 22*   BUN 7   CREATININE 0.8   CALCIUM 8.3*     CBC:   Recent Labs   Lab 11/01/20  1856   WBC 3.90   HGB 11.6*   HCT 33.7*        CMP:   Recent Labs   Lab 11/01/20 2011   *   K 3.7      CO2 22*   GLU 92   BUN 7   CREATININE 0.8   CALCIUM 8.3*   PROT 8.8*   ALBUMIN 3.0*   BILITOT 0.2   ALKPHOS 51*   AST 15   ALT  20   ANIONGAP 8   EGFRNONAA >60.0     Cardiac Markers:   Recent Labs   Lab 11/01/20  1856   BNP 17     Coagulation: No results for input(s): PT, INR, APTT in the last 48 hours.  Magnesium: No results for input(s): MG in the last 48 hours.  POCT Glucose: No results for input(s): POCTGLUCOSE in the last 48 hours.  Troponin:   Recent Labs   Lab 11/01/20 1856 11/01/20  2146   TROPONINI 0.007 0.010     Urine Studies: No results for input(s): COLORU, APPEARANCEUA, PHUR, SPECGRAV, PROTEINUA, GLUCUA, KETONESU, BILIRUBINUA, OCCULTUA, NITRITE, UROBILINOGEN, LEUKOCYTESUR, RBCUA, WBCUA, BACTERIA, SQUAMEPITHEL, HYALINECASTS in the last 48 hours.    Invalid input(s): WRIGHTSUR  All pertinent labs within the past 24 hours have been reviewed.    Significant Imaging: I have reviewed all pertinent imaging results/findings within the past 24 hours.     X-Ray Chest AP Portable   Final Result      1. Interstitial findings are likely related to shallow inspiratory effort and habitus, no large focal consolidation.         Electronically signed by: James Rosales MD   Date:    11/01/2020   Time:    19:24

## 2020-11-02 NOTE — PLAN OF CARE
Patient free from falls and injuries. Fall precautions in place. Skin clean, dry, and intact. Reviewed plan of care with patient. Patient AAOX4, vital signs stable.       No events overnight. No c/o chest pain. Pt resting in bed, no questions or concerns at this time. Pt is pretty sure she will go home in AM. Chest pain and headache monitored frequently. 1 x dose of Mag 1 G IVBP given for headache. Tylenol PRN given to help headache. No questions or concern, will continue to monitor.

## 2020-11-02 NOTE — PLAN OF CARE
Discharge instructions reviewed with Pt; no questions at this time. IV and tele removed. Transport called once Pts ride arrives.

## 2020-11-02 NOTE — H&P
[facility] Medicine  History & Physical    Patient Name: Amna Chawla  MRN: [id number]  Admission Date: 11/1/2020  Attending Physician: Sam Del Rio MD   Primary Care Provider: Tiffany Mina MD    [facility] Medicine Team: [facility] E Emeli Silva, HANK, NP     Patient information was obtained from patient, past medical records and ER records.     Subjective:     Principal Problem:Chest pain    Chief Complaint:   Chief Complaint   Patient presents with    Chest Pain     midsternal that radaites to left arm and shoulder. pacemaker in place. 1 inch of Nitro paste and 3 SL Nitro given by EMS.         HPI: Amna Chawla is a 55 y.o. female with a significant medical history of Afib (on Xarelto), HTN, seizures, prior stroke (residual left sided weakness), asthma, thyroid disease, s/p pacemaker placement, anxiety/depression who presents to the hospital complaining of chest pain.  The patient's chest pain began approximately 1.5 hours prior to presenting to the ED.  She described the pain as sharp, left sided, and radiating to her left arm.  The patient endorsed nausea with the pain but denied SOB.  EMS was activated.  Her chest pain was 10/10 at that time.  Nitropaste was placed and the patient was transported to the ED.   ED workup included labs that were grossly normal for the patient.  Troponin 0.007.  Currently the patient is without c/o chest pain.  C/o HA that she associates w/receiving NTG.      Past Medical History:   Diagnosis Date    Anticoagulant long-term use     Anxiety     Asthma     Atrial fibrillation     Brain anoxic injury     Cervicalgia 8/28/2014    CHI (closed head injury) 2/19/2013    Convulsion 5/30/2015    Decreased ROM of left shoulder 4/12/2017    Defibrillator activation 2013    Depression     Heart block     History of sudden cardiac arrest 2/2013    PEA arrest with subsequent long-QT    Hx of psychiatric care     Hypertension     Left  atrial enlargement 4/11/2018    Pacemaker 2013    Paresthesia 11/1/2013    Prolonged Q-T interval on ECG 2/8/2013    Psychiatric problem     Seizures     Sleep difficulties     Stroke     weakness lt side    Therapy     Thyroid disease     Upper airway resistance syndrome 2/21/2017       Past Surgical History:   Procedure Laterality Date    breast reduction      CARDIAC DEFIBRILLATOR PLACEMENT      CARDIAC DEFIBRILLATOR PLACEMENT      CARPAL TUNNEL RELEASE Right     COLONOSCOPY N/A 5/7/2019    Procedure: COLONOSCOPY;  Surgeon: Juan Coffey MD;  [facility]  Service: Endoscopy;  Laterality: N/A;  per DR. Bustamante Pt to have balloon with DR. Coffey due to excesive looping, could not reach cecum - see telephone encounter 3/8/19 and last procedure report dated 6/24/14/ Per Piedad schedule as 90 min case- ERW  pacemaker/AICD [place]ron[place]/   per , ok to hold Xareltox 2 days    HERNIA REPAIR      HIATAL HERNIA REPAIR      INSERT / REPLACE / REMOVE PACEMAKER  10/2017    CRT-D upgrade    TOTAL REDUCTION MAMMOPLASTY      TRIGGER FINGER RELEASE Left 8/2/2019    Procedure: RELEASE, TRIGGER FINGER left middle;  Surgeon: Matt Pugh Jr., MD;  Location: [facility];  Service: Plastics;  Laterality: Left;    TRIGGER FINGER RELEASE Right 2/11/2020    Procedure: RELEASE, TRIGGER FINGER middle;  Surgeon: Matt Pugh Jr., MD;  Location: [facility];  Service: Plastics;  Laterality: Right;    TUBAL LIGATION         Review of patient's allergies indicates:   Allergen Reactions    Aspirin Hives    Imitrex [sumatriptan] Palpitations    Penicillins Hives and Swelling    Shellfish containing products Anaphylaxis     seafood    Percocet [oxycodone-acetaminophen] Itching     States can take Hydrocodone    Reglan [metoclopramide hcl] Other (See Comments)     Parkinsonism        Current Facility-Administered Medications on File Prior to Encounter   Medication    lactated ringers infusion     lidocaine (PF) 10 mg/ml (1%) injection 5 mg     Current Outpatient Medications on File Prior to Encounter   Medication Sig    ARIPiprazole (ABILIFY) 15 MG Tab Take 1 tablet (15 mg total) by mouth once daily.    carvedilol (COREG) 25 MG tablet TAKE 1 TABLET BY MOUTH TWICE DAILY, WITH MEALS    cimetidine (TAGAMET) 300 MG tablet Take 300 mg 13 hours prior, 7 hours prior, and 1 hour prior to procedure    clopidogreL (PLAVIX) 75 mg tablet Take 75 mg by mouth once daily.    cyanocobalamin, vitamin B-12, 1,000 mcg Subl Place 2,000 mcg under the tongue once daily.    donepezil (ARICEPT) 10 MG tablet Take 1 tablet (10 mg total) by mouth 2 (two) times daily.    ergocalciferol (ERGOCALCIFEROL) 50,000 unit Cap Take 1 capsule (50,000 Units total) by mouth twice a week.    furosemide (LASIX) 20 MG tablet Take 1 tablet (20 mg total) by mouth once daily.    gabapentin (NEURONTIN) 300 MG capsule Take 3 capsules (900 mg total) by mouth 3 (three) times daily.    losartan (COZAAR) 100 MG tablet Take 1 tablet (100 mg total) by mouth once daily.    pantoprazole (PROTONIX) 40 MG tablet Take 1 tablet (40 mg total) by mouth once daily.    rosuvastatin (CRESTOR) 40 MG Tab Take 1 tablet (40 mg total) by mouth every evening.    sertraline (ZOLOFT) 25 MG tablet Take 1 tablet (25 mg total) by mouth once daily.    tiaGABine (GABITRIL) 4 MG tablet Take 1 tablet (4 mg total) by mouth every evening.    XARELTO 20 mg Tab TAKE 1 TABLET BY MOUTH DAILY WITH DINNER OR EVENING MEAL    zolpidem (AMBIEN) 10 mg Tab Take 1 tablet (10 mg total) by mouth nightly as needed.    blood sugar diagnostic Strp 1 strip by Misc.(Non-Drug; Combo Route) route 2 (two) times daily.    blood-glucose meter kit Please provide with insurance covered meter    clonazePAM (KLONOPIN) 1 MG tablet Take 1 tablet (1 mg total) by mouth daily as needed for Anxiety.    diphenhydrAMINE (BENADRYL) 25 mg capsule Take 2 capsules (50 mg total) by mouth as needed for  Itching (Take 50 mg 13 hours prior, 7 hours prior, and 1 hour prior to procedure).    flurbiprofen (ANSAID) 100 MG tablet Take 1 tablet (100 mg total) by mouth 2 (two) times daily as needed (migraine).    fremanezumab-vfrm (AJOVY) 225 mg/1.5 mL injection Inject 1.5 mLs (225 mg total) into the skin every 28 days.    lancets Misc 1 Device by Misc.(Non-Drug; Combo Route) route 2 (two) times daily.    lidocaine (LIDODERM) 5 % Place 1 patch onto the skin once daily. Remove & Discard patch within 12 hours or as directed by MD    magnesium 200 mg Tab Take 200 mg by mouth once daily. (Patient taking differently: Take 200 mg by mouth every other day. )    metFORMIN (GLUCOPHAGE) 1000 MG tablet Take 1 tablet (1,000 mg total) by mouth 2 (two) times daily with meals. for 7 days    mupirocin (BACTROBAN) 2 % ointment Apply to affected area 3 times daily    ondansetron (ZOFRAN-ODT) 4 MG TbDL Take 1 tablet (4 mg total) by mouth every 12 (twelve) hours as needed (nausea).    silver sulfADIAZINE 1% (SILVADENE) 1 % cream Apply topically 2 (two) times daily.    TRUE METRIX GLUCOSE METER Misc TEST BG BID    ubrogepant (UBRELVY)tablet 100 mg Take 1 tablet (100 mg total) by mouth 2 (two) times daily as needed for Migraine.     Family History     Problem Relation (Age of Onset)    COPD     Glaucoma Maternal Grandmother, Paternal Grandmother    Heart failure     Hypertension Mother        Tobacco Use    Smoking status: Never Smoker    Smokeless tobacco: Never Used   Substance and Sexual Activity    Alcohol use: No     Frequency: 2-4 times a month     Drinks per session: 1 or 2     Binge frequency: Never    Drug use: No    Sexual activity: Not Currently     Review of Systems   Constitutional: Negative for chills, fatigue and fever.   HENT: Negative for drooling and trouble swallowing.    Eyes: Negative for photophobia, pain, discharge and visual disturbance.   Respiratory: Negative for cough, chest tightness, shortness of  breath and wheezing.    Cardiovascular: Positive for chest pain (currently resolved). Negative for palpitations and leg swelling.   Gastrointestinal: Positive for nausea (currently resolved). Negative for abdominal pain, constipation, diarrhea and vomiting.   Endocrine: Negative for cold intolerance and heat intolerance.   Genitourinary: Negative for dysuria, frequency, hematuria and urgency.   Musculoskeletal: Negative for gait problem, neck pain and neck stiffness.   Skin: Negative for rash and wound.   Allergic/Immunologic: Negative for environmental allergies and food allergies.   Neurological: Positive for headaches. Negative for dizziness, tremors, speech difficulty, weakness, light-headedness and numbness.   Hematological: Negative for adenopathy. Does not bruise/bleed easily.   Psychiatric/Behavioral: Negative for agitation, confusion and hallucinations.     Objective:     Vital Signs (Most Recent):  Temp: 98.1 °F (36.7 °C) (11/01/20 2257)  Pulse: 71 (11/01/20 2300)  Resp: 18 (11/01/20 2257)  BP: (!) 191/96 (11/01/20 2257)  SpO2: 97 % (11/01/20 2257) Vital Signs (24h Range):  Temp:  [98.1 °F (36.7 °C)-98.6 °F (37 °C)] 98.1 °F (36.7 °C)  Pulse:  [62-95] 71  Resp:  [17-20] 18  SpO2:  [96 %-98 %] 97 %  BP: (156-191)/() 191/96     Weight: (!) 140.6 kg (310 lb)  Body mass index is 53.21 kg/m².    Physical Exam  Vitals signs and nursing note reviewed.   Constitutional:       General: She is not in acute distress.     Appearance: She is well-developed. She is not diaphoretic.   HENT:      Head: Normocephalic and atraumatic.      Right Ear: External ear normal.      Left Ear: External ear normal.      Nose: Nose normal.      Mouth/Throat:      Mouth: Mucous membranes are moist.   Eyes:      General: No scleral icterus.        Right eye: No discharge.         Left eye: No discharge.      Extraocular Movements: Extraocular movements intact.      Conjunctiva/sclera: Conjunctivae normal.      Pupils: Pupils are  equal, round, and reactive to light.   Neck:      Musculoskeletal: Normal range of motion and neck supple.   Cardiovascular:      Rate and Rhythm: Normal rate and regular rhythm.      Heart sounds: Normal heart sounds. No murmur.   Pulmonary:      Effort: Pulmonary effort is normal. No respiratory distress.      Breath sounds: Normal breath sounds.   Abdominal:      General: Bowel sounds are normal. There is no distension.      Palpations: Abdomen is soft.      Tenderness: There is no abdominal tenderness.   Skin:     General: Skin is warm and dry.      Capillary Refill: Capillary refill takes less than 2 seconds.   Neurological:      Mental Status: She is alert and oriented to person, place, and time.   Psychiatric:         Mood and Affect: Affect is blunt.         Behavior: Behavior normal. Behavior is cooperative.         Thought Content: Thought content normal.           CRANIAL NERVES     CN III, IV, VI   Pupils are equal, round, and reactive to light.       Significant Labs:   A1C:   Recent Labs   Lab 06/12/20  0720   HGBA1C 6.2*     BMP:   Recent Labs   Lab 11/01/20 2011   GLU 92   *   K 3.7      CO2 22*   BUN 7   CREATININE 0.8   CALCIUM 8.3*     CBC:   Recent Labs   Lab 11/01/20 1856   WBC 3.90   HGB 11.6*   HCT 33.7*        CMP:   Recent Labs   Lab 11/01/20 2011   *   K 3.7      CO2 22*   GLU 92   BUN 7   CREATININE 0.8   CALCIUM 8.3*   PROT 8.8*   ALBUMIN 3.0*   BILITOT 0.2   ALKPHOS 51*   AST 15   ALT 20   ANIONGAP 8   EGFRNONAA >60.0     Cardiac Markers:   Recent Labs   Lab 11/01/20 1856   BNP 17     Coagulation: No results for input(s): PT, INR, APTT in the last 48 hours.  Magnesium: No results for input(s): MG in the last 48 hours.  POCT Glucose: No results for input(s): POCTGLUCOSE in the last 48 hours.  Troponin:   Recent Labs   Lab 11/01/20 1856 11/01/20 2146   TROPONINI 0.007 0.010     Urine Studies: No results for input(s): COLORU, APPEARANCEUA, PHUR,  SPECGRAV, PROTEINUA, GLUCUA, KETONESU, BILIRUBINUA, OCCULTUA, NITRITE, UROBILINOGEN, LEUKOCYTESUR, RBCUA, WBCUA, BACTERIA, SQUAMEPITHEL, HYALINECASTS in the last 48 hours.    Invalid input(s): STEPHANIE  All pertinent labs within the past 24 hours have been reviewed.    Significant Imaging: I have reviewed all pertinent imaging results/findings within the past 24 hours.     X-Ray Chest AP Portable   Final Result      1. Interstitial findings are likely related to shallow inspiratory effort and habitus, no large focal consolidation.         Electronically signed by: James Rosales MD   Date:    11/01/2020   Time:    19:24            Assessment/Plan:     * Chest pain  Amna Chawla is a 55 y.o. female with a significant medical history of Afib (on Xarelto), HTN, seizures, prior stroke (residual left sided weakness), asthma, thyroid disease, s/p pacemaker placement, anxiety/depression who presents to the hospital complaining of chest pain.  NTG paste was placed prior to her arrival to the ED.  She was given Morphine in the ED which helped her pain.  CP currently 0/10.    -Troponin 0.007, trend  -Cardiac diet  -Continue statin and HTN meds      Headache  -Patient c/o HA that she associates w/receiving NTG.  -Toradol 30mg and Magnesium 1g IV once  -The patient's home migraine meds are not formulary at this facility.  -Ibuprofen 600 mg Q8h prn HA        Essential hypertension  -Continue losartan  -Hydralazine prn      Prediabetes  -Hx of Glucophage prescription, currently not taking.  -A1c pending  -Glucose in the ED 92      Paroxysmal atrial fibrillation  Long term use of anticoagulants    -Continue Xarelto and Coreg      Mixed hyperlipidemia  -Continue Crestor      VTE Risk Mitigation (From admission, onward)         Ordered     rivaroxaban tablet 20 mg  with dinner      11/01/20 2152     IP VTE HIGH RISK PATIENT  Once      11/01/20 2142     Place sequential compression device  Until discontinued      11/01/20 2142      Place sequential compression device  Until discontinued      11/01/20 3952                   Emeli Silva DNP, NP  Department of Hospital Medicine   [facility]

## 2020-11-02 NOTE — PLAN OF CARE
[facility]  [address] [address]  Phone: [phone]  Fax: [phone]    Patient Name: Amna Chawla  Patient : [id number]  Admission Date: 2020  Today's Date: 2020    To Whom It May Concern:    Amna Chawla  was admitted to [facility] on 2020 and is expected to remain hospitalized beyond 2020. This patient is under my direct care.      Sam Del Rio MD

## 2020-11-02 NOTE — PLAN OF CARE
11/02/20 1119   Discharge Assessment   Assessment Type Discharge Planning Assessment   Confirmed/corrected address and phone number on facesheet? Yes   Assessment information obtained from? Medical Record   Expected Length of Stay (days) 1   Prior to hospitilization cognitive status: Alert/Oriented   Prior to hospitalization functional status: Independent   Current cognitive status: Alert/Oriented   Current Functional Status: Independent   Lives With child(glen), dependent   Able to Return to Prior Arrangements yes   Is patient able to care for self after discharge? Yes   Patient's perception of discharge disposition home or selfcare   Readmission Within the Last 30 Days no previous admission in last 30 days  (Observation status)   Patient currently being followed by outpatient case management? No   Patient currently receives any other outside agency services? No   Equipment Currently Used at Home bath bench;rollator   Do you have any problems affording any of your prescribed medications? TBD   Is the patient taking medications as prescribed? yes   Does the patient have transportation home? Yes   Transportation Anticipated family or friend will provide   Does the patient receive services at the Coumadin Clinic? No   Discharge Plan A Home with family   DME Needed Upon Discharge  none   Placed in Obs for CP, HA and HTN. Information obtained from EMR. Lives with children. Independent in her ADLs. Plan is to DC home. No DC needs anticipated.

## 2020-11-02 NOTE — ED NOTES
LOC: The patient is awake, alert and aware of environment with an appropriate affect, the patient is oriented x 3 and speaking appropriately.  APPEARANCE: patient is clean and well groomed, patient's clothing is properly fastened.  SKIN: The skin is warm and dry, color consistent with ethnicity, patient has normal skin turgor and moist mucus membranes, skin intact, no breakdown or bruising noted.   MUSCULOSKELETAL: Patient moving all extremities spontaneously, no obvious swelling or deformities noted.  RESPIRATORY: Airway is open and patent, respirations are spontaneous, patient has a normal effort and rate  ABDOMEN: Soft and non tender to palpation, no distention noted  NEUROLOGIC:  facial expression is symmetrical, patient moving all extremities spontaneously, normal sensation in all extremities when touched with a finger.  Follows all commands appropriately.    Patient states she was in the car riding with her daughter when she had a sudden onset of chest pain in the center of her chest, she states it was a stabbing feeling rating pain 10/10 on pain scale, patient states she waited till she arrived home to her daughters but she states the pain did not improve, ems was called, patient was given 1in of nitro paste, and x3 nitro, patient states her pain has improved but is still there as a dull pain, cardiac monitoring in progress, vss, will continue to monitor

## 2020-11-02 NOTE — ASSESSMENT & PLAN NOTE
Amna Chawla is a 55 y.o. female with a significant medical history of Afib (on Xarelto), HTN, seizures, prior stroke (residual left sided weakness), asthma, thyroid disease, s/p pacemaker placement, anxiety/depression who presents to the hospital complaining of chest pain.  NTG paste was placed prior to her arrival to the ED.  She was given Morphine in the ED which helped her pain.  CP currently 0/10.    -Troponin 0.007>> 0.01>> 0.006  - symptoms resolved, no further episodes of CP throughout admission  -Cardiac diet  -Continue statin and HTN meds  - already has FRITZ ordered to be done as outpt  - follow up w/ outpt cardiology (Dr. Mejia) as previously scheduled  - appointment w/ PCP on 11/5/20

## 2020-11-02 NOTE — ASSESSMENT & PLAN NOTE
-Patient c/o HA that she associates w/receiving NTG.  -Toradol 30mg and Magnesium 1g IV once  - HA resolved  - continue home prn migraine meds on discharge

## 2020-11-02 NOTE — TELEPHONE ENCOUNTER
"Katina Hernandez Staff             Dr. Cortez   Medicare requires that "15 or more headache days lasting 4 or more hours" and the list of muscles being inject with the dose of each muscle be documented in clinical notes. Can you please do an Addendum, and notify me when completed?  Medicare authorization process is 12 to 15 business days.       3rd SEND     Thanks,     Katina Green     [employer] Precertification Nurse-LPN   [phone]        "

## 2020-11-02 NOTE — NURSING
Pt states that she is currently not experiencing chest pain. Pt stated that the NTG has caused a slight headache. BP elevated. MD Kalee notified. IDA Silva at bedside also notified. Coreg 25 mg ordered. MD stated to give and recheck BP. Will continue to monitor.

## 2020-11-02 NOTE — NURSING TRANSFER
Nursing Transfer Note      11/1/2020     Transfer To:  from ED.     Transfer via stretcher    Transfer with cardiac monitoring    Transported by transport    Medicines sent: none    Chart send with patient: Yes    Notified: daughter    Patient reassessed at: 11/1, 2245 (date, time)    Upon arrival to floor: cardiac monitor applied, patient oriented to room, call bell in reach and bed in lowest position.

## 2020-11-02 NOTE — DISCHARGE SUMMARY
[facility] Hospital Medicine  Discharge Summary      Patient Name: Amna Chawla  MRN: [id number]  Admission Date: 11/1/2020  Hospital Length of Stay: 0 days  Discharge Date and Time: 11/2/2020  3:36 PM  Attending Physician: Sam Del Rio  Discharging Provider: Taylor Jack PA-C  Primary Care Provider: Tiffany Mina MD  [facility] Medicine Team: [facility] MED E Taylor Jack PA-C    HPI:   Amna Chawla is a 55 y.o. female with a significant medical history of Afib (on Xarelto), HTN, seizures, prior stroke (residual left sided weakness), asthma, thyroid disease, s/p pacemaker placement, anxiety/depression who presents to the hospital complaining of chest pain.  The patient's chest pain began approximately 1.5 hours prior to presenting to the ED.  She described the pain as sharp, left sided, and radiating to her left arm.  The patient endorsed nausea with the pain but denied SOB.  EMS was activated.  Her chest pain was 10/10 at that time.  Nitropaste was placed and the patient was transported to the ED.   ED workup included labs that were grossly normal for the patient.  Troponin 0.007.  Currently the patient is without c/o chest pain.  C/o HA that she associates w/receiving NTG.      * No surgery found *      Hospital Course:   Ms. Chawla was admitted to observation for left-sided chest pain w/ ACS risk stratification. Intake labs unremarkable. Troponin negative x3. EKG paced without evidence of ischemic changes. BNP wnl. CXR negative. Given NTG & morphine with resolution of symptoms. No further episodes of chest pain throughout admission. Remained HDS. Patient medically stable for discharge home. Plan for outpt FRITZ as previously ordered and follow up w/ outpatient cardiology (Dr. Mejia) as scheduled. PCP appointment on 11/5/20. Patient verbalized understanding. All questions and concerns addressed.      Consults:     * Left-sided chest pain  -Troponin 0.007>> 0.01>> 0.006  -  symptoms resolved, no further episodes of CP throughout admission  -Cardiac diet  -Continue statin and HTN meds  - already has FRITZ ordered to be done as outpt  - follow up w/ outpt cardiology (Dr. Mejia) as previously scheduled  - appointment w/ PCP on 11/5/20    Headache  -Patient c/o HA that she associates w/receiving NTG.  -Toradol 30mg and Magnesium 1g IV once  - HA resolved  - continue home prn migraine meds on discharge    Prediabetes  -Hx of Glucophage prescription, currently not taking.  -A1c 6.1  - further eval & management per PCP    Paroxysmal atrial fibrillation  Long term use of anticoagulants  - follows closely w/ Dr. Mejia  -Continue Xarelto and Coreg    Essential hypertension  -Continue losartan 100mg daily & coreg 25mg BID  -Hydralazine prn    Mixed hyperlipidemia  -Continue Crestor 40mg daily    Final Active Diagnoses:    Diagnosis Date Noted POA    PRINCIPAL PROBLEM:  Left-sided chest pain [R07.9] 09/14/2020 Yes    Headache [R51.9] 11/01/2020 Yes    Prediabetes [R73.03] 09/17/2019 Yes    Long term (current) use of anticoagulants [Z79.01] 03/10/2017 Not Applicable    Paroxysmal atrial fibrillation [I48.0] 03/09/2017 Yes    Essential hypertension [I10] 05/30/2015 Yes    Mixed hyperlipidemia [E78.2] 09/05/2013 Yes      Problems Resolved During this Admission:       Discharged Condition: stable    Disposition: Home or Self Care    Follow Up:  Follow-up Information     Tiffany Mina MD. Go on 11/5/2020.    Specialty: Internal Medicine  Why: previously scheduled appointment   Contact information:  [facility] [address]  [facility] [id number] [phone]                 Patient Instructions:      Diet Cardiac     Diet diabetic     Notify your health care provider if you experience any of the following:  temperature >100.4     Notify your health care provider if you experience any of the following:  persistent nausea and vomiting or diarrhea     Notify your health care provider if you experience  any of the following:  severe uncontrolled pain     Notify your health care provider if you experience any of the following:  redness, tenderness, or signs of infection (pain, swelling, redness, odor or green/yellow discharge around incision site)     Notify your health care provider if you experience any of the following:  difficulty breathing or increased cough     Notify your health care provider if you experience any of the following:  severe persistent headache     Notify your health care provider if you experience any of the following:  worsening rash     Notify your health care provider if you experience any of the following:  persistent dizziness, light-headedness, or visual disturbances     Notify your health care provider if you experience any of the following:  increased confusion or weakness     Activity as tolerated       Significant Diagnostic Studies: Labs: All labs within the past 24 hours have been reviewed    Pending Diagnostic Studies:     None         Medications:  Reconciled Home Medications:      Medication List      CHANGE how you take these medications    magnesium 200 mg Tab  Take 200 mg by mouth once daily.  What changed: when to take this        CONTINUE taking these medications    AJOVY SYRINGE 225 mg/1.5 mL injection  Generic drug: fremanezumab-vfrm  Inject 1.5 mLs (225 mg total) into the skin every 28 days.     ARIPiprazole 15 MG Tab  Commonly known as: ABILIFY  Take 1 tablet (15 mg total) by mouth once daily.     blood sugar diagnostic Strp  1 strip by Misc.(Non-Drug; Combo Route) route 2 (two) times daily.     * blood-glucose meter kit  Please provide with insurance covered meter     * TRUE METRIX GLUCOSE METER Misc  Generic drug: blood-glucose meter  TEST BG BID     carvediloL 25 MG tablet  Commonly known as: COREG  TAKE 1 TABLET BY MOUTH TWICE DAILY, WITH MEALS     cimetidine 300 MG tablet  Commonly known as: TAGAMET  Take 300 mg 13 hours prior, 7 hours prior, and 1 hour prior to  procedure     clonazePAM 1 MG tablet  Commonly known as: KLONOPIN  Take 1 tablet (1 mg total) by mouth daily as needed for Anxiety.     clopidogreL 75 mg tablet  Commonly known as: PLAVIX  Take 75 mg by mouth once daily.     cyanocobalamin (vitamin B-12) 1,000 mcg Subl  Place 2,000 mcg under the tongue once daily.     diphenhydrAMINE 25 mg capsule  Commonly known as: BenadryL  Take 2 capsules (50 mg total) by mouth as needed for Itching (Take 50 mg 13 hours prior, 7 hours prior, and 1 hour prior to procedure).     donepeziL 10 MG tablet  Commonly known as: ARICEPT  Take 1 tablet (10 mg total) by mouth 2 (two) times daily.     ergocalciferol 50,000 unit Cap  Commonly known as: ERGOCALCIFEROL  Take 1 capsule (50,000 Units total) by mouth twice a week.     flurbiprofen 100 MG tablet  Commonly known as: ANSAID  Take 1 tablet (100 mg total) by mouth 2 (two) times daily as needed (migraine).     furosemide 20 MG tablet  Commonly known as: LASIX  Take 1 tablet (20 mg total) by mouth once daily.     gabapentin 300 MG capsule  Commonly known as: NEURONTIN  Take 3 capsules (900 mg total) by mouth 3 (three) times daily.     lancets Misc  1 Device by Misc.(Non-Drug; Combo Route) route 2 (two) times daily.     lidocaine 5 %  Commonly known as: LIDODERM  Place 1 patch onto the skin once daily. Remove & Discard patch within 12 hours or as directed by MD     losartan 100 MG tablet  Commonly known as: COZAAR  Take 1 tablet (100 mg total) by mouth once daily.     metFORMIN 1000 MG tablet  Commonly known as: GLUCOPHAGE  Take 1 tablet (1,000 mg total) by mouth 2 (two) times daily with meals. for 7 days     mupirocin 2 % ointment  Commonly known as: BACTROBAN  Apply to affected area 3 times daily     ondansetron 4 MG Tbdl  Commonly known as: ZOFRAN-ODT  Take 1 tablet (4 mg total) by mouth every 12 (twelve) hours as needed (nausea).     pantoprazole 40 MG tablet  Commonly known as: PROTONIX  Take 1 tablet (40 mg total) by mouth once  daily.     rosuvastatin 40 MG Tab  Commonly known as: CRESTOR  Take 1 tablet (40 mg total) by mouth every evening.     sertraline 25 MG tablet  Commonly known as: ZOLOFT  Take 1 tablet (25 mg total) by mouth once daily.     silver sulfADIAZINE 1% 1 % cream  Commonly known as: SILVADENE  Apply topically 2 (two) times daily.     tiaGABine 4 MG tablet  Commonly known as: GABITRIL  Take 1 tablet (4 mg total) by mouth every evening.     ubrogepant 100 mg tablet  Commonly known as: ubrogepant  Take 1 tablet (100 mg total) by mouth 2 (two) times daily as needed for Migraine.     XARELTO 20 mg Tab  Generic drug: rivaroxaban  TAKE 1 TABLET BY MOUTH DAILY WITH DINNER OR EVENING MEAL     zolpidem 10 mg Tab  Commonly known as: AMBIEN  Take 1 tablet (10 mg total) by mouth nightly as needed.         * This list has 2 medication(s) that are the same as other medications prescribed for you. Read the directions carefully, and ask your doctor or other care provider to review them with you.                Indwelling Lines/Drains at time of discharge:   Lines/Drains/Airways     None                 Time spent on the discharge of patient: 32 minutes  Patient was seen and examined on the date of discharge and determined to be suitable for discharge.       Discussed with staff, Eliane Jack PA-C  Department of Hospital Medicine  [facility]

## 2020-11-03 ENCOUNTER — DOCUMENTATION ONLY (OUTPATIENT)
Dept: NEUROLOGY | Facility: HOSPITAL | Age: 55
End: 2020-11-03

## 2020-11-03 ENCOUNTER — SPECIALTY PHARMACY (OUTPATIENT)
Dept: PHARMACY | Facility: CLINIC | Age: 55
End: 2020-11-03

## 2020-11-03 NOTE — PLAN OF CARE
11/03/20 0657   Final Note   Assessment Type Final Discharge Note   Anticipated Discharge Disposition Home   Hospital Follow Up  Appt(s) scheduled? Yes

## 2020-11-03 NOTE — PROGRESS NOTES
This is to state that Ms Chawla has 20 plus headache days lasting 4 or more hours. She has a diagnosis of Chronic Migraine.   BOTOX is an FDA approved indicated therapy for intractable chronic migraine headaches given that she failed > 3 prophylactic medications  Please see below FDA approved protocol:      Please refer to   [email or web address]     During her last BOTOX on 6/26/20 please see below injection sites:   Injection sites:    muscle bilaterally ( a total of 10 units divided into 2 sites)   Procerus muscle (5 units)   Frontalis muscle bilaterally (a total of 20 units divided into 4 sites)   Temporalis muscle bilaterally (a total of 50 units divided into 8 sites) + 2 sites   Occipitalis muscle bilaterally (a total of 30 units divided into 6 sites)   Cervical paraspinal muscles (a total of 20 units divided into 4 sites)   Trapezius muscle bilaterally (a total of 30 units divided into 6 sites)   Masseter 5 units [place]     Please contact the Neurology clinic for further questions   David Cortez MD

## 2020-11-04 ENCOUNTER — PATIENT MESSAGE (OUTPATIENT)
Dept: ELECTROPHYSIOLOGY | Facility: CLINIC | Age: 55
End: 2020-11-04

## 2020-11-05 ENCOUNTER — PATIENT OUTREACH (OUTPATIENT)
Dept: OTHER | Facility: OTHER | Age: 55
End: 2020-11-05

## 2020-11-05 ENCOUNTER — OFFICE VISIT (OUTPATIENT)
Dept: INTERNAL MEDICINE | Facility: CLINIC | Age: 55
End: 2020-11-05
Payer: MEDICARE

## 2020-11-05 ENCOUNTER — IMMUNIZATION (OUTPATIENT)
Dept: INTERNAL MEDICINE | Facility: CLINIC | Age: 55
End: 2020-11-05
Payer: MEDICARE

## 2020-11-05 VITALS
OXYGEN SATURATION: 95 % | DIASTOLIC BLOOD PRESSURE: 82 MMHG | HEART RATE: 91 BPM | SYSTOLIC BLOOD PRESSURE: 132 MMHG | BODY MASS INDEX: 50.02 KG/M2 | WEIGHT: 293 LBS | HEIGHT: 64 IN

## 2020-11-05 DIAGNOSIS — I10 ESSENTIAL HYPERTENSION: Primary | ICD-10-CM

## 2020-11-05 DIAGNOSIS — Z86.74 HISTORY OF SUDDEN CARDIAC ARREST: ICD-10-CM

## 2020-11-05 DIAGNOSIS — Z86.73 HISTORY OF CVA (CEREBROVASCULAR ACCIDENT): ICD-10-CM

## 2020-11-05 DIAGNOSIS — G43.711 CHRONIC MIGRAINE WITHOUT AURA, WITH INTRACTABLE MIGRAINE, SO STATED, WITH STATUS MIGRAINOSUS: ICD-10-CM

## 2020-11-05 DIAGNOSIS — F41.9 ANXIETY: ICD-10-CM

## 2020-11-05 DIAGNOSIS — M25.569 KNEE PAIN, UNSPECIFIED CHRONICITY, UNSPECIFIED LATERALITY: ICD-10-CM

## 2020-11-05 DIAGNOSIS — Z74.09 IMPAIRED FUNCTIONAL MOBILITY, BALANCE, GAIT, AND ENDURANCE: Chronic | ICD-10-CM

## 2020-11-05 DIAGNOSIS — M25.561 CHRONIC PAIN OF BOTH KNEES: Primary | ICD-10-CM

## 2020-11-05 DIAGNOSIS — M25.562 CHRONIC PAIN OF BOTH KNEES: Primary | ICD-10-CM

## 2020-11-05 DIAGNOSIS — T82.110D PACEMAKER LEAD MALFUNCTION, SUBSEQUENT ENCOUNTER: ICD-10-CM

## 2020-11-05 DIAGNOSIS — Z86.73 HISTORY OF TIA (TRANSIENT ISCHEMIC ATTACK): ICD-10-CM

## 2020-11-05 DIAGNOSIS — F33.1 DEPRESSION, MAJOR, RECURRENT, MODERATE: ICD-10-CM

## 2020-11-05 DIAGNOSIS — F06.8 COGNITIVE DEFICIT AS LATE EFFECT OF TRAUMATIC BRAIN INJURY: ICD-10-CM

## 2020-11-05 DIAGNOSIS — D51.9 ANEMIA DUE TO VITAMIN B12 DEFICIENCY, UNSPECIFIED B12 DEFICIENCY TYPE: ICD-10-CM

## 2020-11-05 DIAGNOSIS — G89.29 CHRONIC PAIN OF BOTH KNEES: Primary | ICD-10-CM

## 2020-11-05 DIAGNOSIS — I10 ESSENTIAL HYPERTENSION: ICD-10-CM

## 2020-11-05 DIAGNOSIS — I44.2 COMPLETE AV BLOCK: ICD-10-CM

## 2020-11-05 DIAGNOSIS — I48.0 PAROXYSMAL ATRIAL FIBRILLATION: ICD-10-CM

## 2020-11-05 DIAGNOSIS — S06.9X0S COGNITIVE DEFICIT AS LATE EFFECT OF TRAUMATIC BRAIN INJURY: ICD-10-CM

## 2020-11-05 DIAGNOSIS — Z79.01 LONG TERM (CURRENT) USE OF ANTICOAGULANTS: ICD-10-CM

## 2020-11-05 DIAGNOSIS — Z95.0 PACEMAKER: ICD-10-CM

## 2020-11-05 PROCEDURE — 90686 IIV4 VACC NO PRSV 0.5 ML IM: CPT | Mod: PBBFAC

## 2020-11-05 PROCEDURE — 99999 PR PBB SHADOW E&M-EST. PATIENT-LVL V: ICD-10-PCS | Mod: PBBFAC,,, | Performed by: NURSE PRACTITIONER

## 2020-11-05 PROCEDURE — 99215 OFFICE O/P EST HI 40 MIN: CPT | Mod: PBBFAC,25 | Performed by: NURSE PRACTITIONER

## 2020-11-05 PROCEDURE — 99214 OFFICE O/P EST MOD 30 MIN: CPT | Mod: S$PBB,,, | Performed by: NURSE PRACTITIONER

## 2020-11-05 PROCEDURE — 99999 PR PBB SHADOW E&M-EST. PATIENT-LVL V: CPT | Mod: PBBFAC,,, | Performed by: NURSE PRACTITIONER

## 2020-11-05 PROCEDURE — 99214 PR OFFICE/OUTPT VISIT, EST, LEVL IV, 30-39 MIN: ICD-10-PCS | Mod: S$PBB,,, | Performed by: NURSE PRACTITIONER

## 2020-11-05 RX ORDER — DICLOFENAC SODIUM 10 MG/G
2 GEL TOPICAL 4 TIMES DAILY
Qty: 100 G | Refills: 1 | Status: SHIPPED | OUTPATIENT
Start: 2020-11-05 | End: 2023-09-20 | Stop reason: SDUPTHER
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
## 2020-11-09 ENCOUNTER — PATIENT MESSAGE (OUTPATIENT)
Dept: MEDSURG UNIT | Facility: HOSPITAL | Age: 55
End: 2020-11-09

## 2020-11-10 ENCOUNTER — DOCUMENTATION ONLY (OUTPATIENT)
Dept: REHABILITATION | Facility: OTHER | Age: 55
End: 2020-11-10

## 2020-11-10 NOTE — PROGRESS NOTES
Pt failed to appear for physical therapy appointment without notification today.      Anca Stanley, PT

## 2020-11-11 ENCOUNTER — TELEPHONE (OUTPATIENT)
Dept: NEUROLOGY | Facility: CLINIC | Age: 55
End: 2020-11-11

## 2020-11-12 ENCOUNTER — LAB VISIT (OUTPATIENT)
Dept: LAB | Facility: HOSPITAL | Age: 55
End: 2020-11-12
Attending: INTERNAL MEDICINE
Payer: MEDICARE

## 2020-11-12 ENCOUNTER — PROCEDURE VISIT (OUTPATIENT)
Dept: NEUROLOGY | Facility: CLINIC | Age: 55
End: 2020-11-12
Payer: MEDICARE

## 2020-11-12 VITALS
HEART RATE: 77 BPM | BODY MASS INDEX: 50.02 KG/M2 | DIASTOLIC BLOOD PRESSURE: 84 MMHG | WEIGHT: 293 LBS | HEIGHT: 64 IN | SYSTOLIC BLOOD PRESSURE: 145 MMHG

## 2020-11-12 DIAGNOSIS — G50.0 SUPRAORBITAL NEURALGIA: ICD-10-CM

## 2020-11-12 DIAGNOSIS — I49.9 CARDIAC ARRHYTHMIA, UNSPECIFIED CARDIAC ARRHYTHMIA TYPE: ICD-10-CM

## 2020-11-12 DIAGNOSIS — E66.01 OBESITY, CLASS III, BMI 40-49.9 (MORBID OBESITY): ICD-10-CM

## 2020-11-12 DIAGNOSIS — G43.711 CHRONIC MIGRAINE WITHOUT AURA, WITH INTRACTABLE MIGRAINE, SO STATED, WITH STATUS MIGRAINOSUS: Primary | ICD-10-CM

## 2020-11-12 DIAGNOSIS — T82.110D PACEMAKER LEAD MALFUNCTION, SUBSEQUENT ENCOUNTER: ICD-10-CM

## 2020-11-12 LAB
ABO + RH BLD: NORMAL
ANION GAP SERPL CALC-SCNC: 6 MMOL/L (ref 8–16)
APTT BLDCRRT: 25.1 SEC (ref 21–32)
BASOPHILS # BLD AUTO: 0.01 K/UL (ref 0–0.2)
BASOPHILS NFR BLD: 0.3 % (ref 0–1.9)
BLD GP AB SCN CELLS X3 SERPL QL: NORMAL
BUN SERPL-MCNC: 9 MG/DL (ref 6–20)
CALCIUM SERPL-MCNC: 8.9 MG/DL (ref 8.7–10.5)
CHLORIDE SERPL-SCNC: 108 MMOL/L (ref 95–110)
CO2 SERPL-SCNC: 22 MMOL/L (ref 23–29)
CREAT SERPL-MCNC: 0.8 MG/DL (ref 0.5–1.4)
DIFFERENTIAL METHOD: ABNORMAL
EOSINOPHIL # BLD AUTO: 0 K/UL (ref 0–0.5)
EOSINOPHIL NFR BLD: 0.3 % (ref 0–8)
ERYTHROCYTE [DISTWIDTH] IN BLOOD BY AUTOMATED COUNT: 15.5 % (ref 11.5–14.5)
EST. GFR  (AFRICAN AMERICAN): >60 ML/MIN/1.73 M^2
EST. GFR  (NON AFRICAN AMERICAN): >60 ML/MIN/1.73 M^2
GLUCOSE SERPL-MCNC: 116 MG/DL (ref 70–110)
HCT VFR BLD AUTO: 34.3 % (ref 37–48.5)
HGB BLD-MCNC: 11.2 G/DL (ref 12–16)
IMM GRANULOCYTES # BLD AUTO: 0 K/UL (ref 0–0.04)
IMM GRANULOCYTES NFR BLD AUTO: 0 % (ref 0–0.5)
INR PPP: 1 (ref 0.8–1.2)
LYMPHOCYTES # BLD AUTO: 1.4 K/UL (ref 1–4.8)
LYMPHOCYTES NFR BLD: 42.6 % (ref 18–48)
MCH RBC QN AUTO: 29.9 PG (ref 27–31)
MCHC RBC AUTO-ENTMCNC: 32.7 G/DL (ref 32–36)
MCV RBC AUTO: 92 FL (ref 82–98)
MONOCYTES # BLD AUTO: 0.4 K/UL (ref 0.3–1)
MONOCYTES NFR BLD: 12 % (ref 4–15)
NEUTROPHILS # BLD AUTO: 1.4 K/UL (ref 1.8–7.7)
NEUTROPHILS NFR BLD: 44.8 % (ref 38–73)
NRBC BLD-RTO: 0 /100 WBC
PLATELET # BLD AUTO: 217 K/UL (ref 150–350)
PMV BLD AUTO: 11.1 FL (ref 9.2–12.9)
POTASSIUM SERPL-SCNC: 4.3 MMOL/L (ref 3.5–5.1)
PROTHROMBIN TIME: 10.7 SEC (ref 9–12.5)
RBC # BLD AUTO: 3.74 M/UL (ref 4–5.4)
SODIUM SERPL-SCNC: 136 MMOL/L (ref 136–145)
WBC # BLD AUTO: 3.17 K/UL (ref 3.9–12.7)

## 2020-11-12 PROCEDURE — 64615 CHEMODENERV MUSC MIGRAINE: CPT | Mod: S$PBB,,, | Performed by: PSYCHIATRY & NEUROLOGY

## 2020-11-12 PROCEDURE — 85610 PROTHROMBIN TIME: CPT

## 2020-11-12 PROCEDURE — 85025 COMPLETE CBC W/AUTO DIFF WBC: CPT

## 2020-11-12 PROCEDURE — 64615 PR CHEMODENERVATION OF MUSCLE FOR CHRONIC MIGRAINE: ICD-10-PCS | Mod: S$PBB,,, | Performed by: PSYCHIATRY & NEUROLOGY

## 2020-11-12 PROCEDURE — 80048 BASIC METABOLIC PNL TOTAL CA: CPT

## 2020-11-12 PROCEDURE — 99499 UNLISTED E&M SERVICE: CPT | Mod: S$PBB,,, | Performed by: PSYCHIATRY & NEUROLOGY

## 2020-11-12 PROCEDURE — 96372 THER/PROPH/DIAG INJ SC/IM: CPT | Mod: PBBFAC

## 2020-11-12 PROCEDURE — 86901 BLOOD TYPING SEROLOGIC RH(D): CPT

## 2020-11-12 PROCEDURE — 85730 THROMBOPLASTIN TIME PARTIAL: CPT

## 2020-11-12 PROCEDURE — 36415 COLL VENOUS BLD VENIPUNCTURE: CPT

## 2020-11-12 PROCEDURE — 99499 NO LOS: ICD-10-PCS | Mod: S$PBB,,, | Performed by: PSYCHIATRY & NEUROLOGY

## 2020-11-12 PROCEDURE — 64615 CHEMODENERV MUSC MIGRAINE: CPT | Mod: PBBFAC | Performed by: PSYCHIATRY & NEUROLOGY

## 2020-11-12 RX ORDER — KETOROLAC TROMETHAMINE 30 MG/ML
60 INJECTION, SOLUTION INTRAMUSCULAR; INTRAVENOUS
Status: COMPLETED | OUTPATIENT
Start: 2020-11-12 | End: 2020-11-12

## 2020-11-12 RX ADMIN — KETOROLAC TROMETHAMINE 60 MG: 60 INJECTION, SOLUTION INTRAMUSCULAR at 11:11

## 2020-11-12 RX ADMIN — ONABOTULINUMTOXINA 200 UNITS: 100 INJECTION, POWDER, LYOPHILIZED, FOR SOLUTION INTRADERMAL; INTRAMUSCULAR at 11:11

## 2020-11-12 NOTE — PROCEDURES
Follow up:   3 days of severe L sided HA + nausea   Gradual onset   Ubrelvy, zanaflex, flurbiprofen - not helping   Vision - nml     Prior note:   HA stable   Try Ubrelvy again      Prior note:   independent left and right parietal ice prick HA      Prior note:   HA much improved   Only 2 since last visit      Prior note:   HA are more frequent   Taking 1600 mg motrin + zanaflex   Went to ER recently      Prior note:   HA overall stable in Hz and intensity   Reports a wearing off effect of BOTOX since last week   Taking zanaflex 8 mg TID        Prior note:   HA started 12/24   She feels BOTOX wore off last week   Reports GI distress from taking to many motrin      Prior note:   4/week HA - L vertex   Jabs - vertex either sides      She reports migraine that woke her up in the morning - associated with L side facial droop      Prior note:   She gets acceptable relief for 2.5 months after BOTOX      Prior note:   No recurrent stroke symptoms   starting having whole body tremors since this AM. Took 1 tablet of lorazepam   Last night had a HA, took trazodone       Admit Date: 4/11/2018  2:03 PM   Discharge Date and Time: 4/12/2018  8:30 PM   History of Present Illness: Ms. Chawla is a 53 yo F with afib on Eliquis (last dose this am), prior cardiac arrest with associated acute ischemic stroke with residual mild L sided weakness, CAD with ICD in situ, HTN, and HLD who presented with acute R sided weakness and sensation changes.  She originally called EMS for palpitations, but developed acute right sided facial droop and RUE weakness at 1:40pm today, after EMS had arrived.  She denies changes to speech or vision.  SBP en route 200/100.  She is ineligible for tPA 2/2 Eliquis use.  She is not suspected to have an LVO.  She will be admitted to [facility] for observation overnight.     Hospital Course (synopsis of major diagnoses, care, treatment, and services provided during the course of the hospital stay): Ms. Chawla was admitted  for acute stroke workup. Pt with pacemaker so unable to obtain MRI Brain; CTA H/N demonstrated no evidence acute infarction, though did exhibit noncalcified plaquing of carotid bifurcations and proximal ICAs with < 50% stenosis, so Plavix was added to pt's daily home regimen. Concerned for TIA at this time though Migraine very high on differential due to pt's hx headaches, including presently this admission. Hypertensive urgency/emergency also considered due to pt's very elevated levels with EMS. Per chart review, Eliquis was a new medication since 4/3/18 (had switched from Coumadin), however staff Faraz concerned that pt had a potential event while on Eliquis. She wished to switch to Pradaxa as an alternative DOAC (as it has an option for reversal), however changed to Xarelto per pt's insurance coverage.   While admitted, pt given cocktail for HA which she has received previously at the infusion clinic - IV Magnesium, IM Toradol, 2mg IV Morphine, 500cc NS bolus (IV Benadryl not included this time as pt had received a dose earlier that day prior to contrast imaging.) HA improved somewhat and pt was encouraged to follow up with Dr. Cortez for continued management of botox injections, etc. At that time, pt also found with hyponatremia; d/c'd Clorthalidone and plan was made for pt to [facility] 4[facility]16/18 for repeat CMP to evaluate Na level and BP check since discontinuing this medication.  Ms. Chawla was evaluated and treated by PT, OT, and SLP who recommend d/c with outpatient PT and OT. She was discharged home 4/12/18 with [facility] follow-up Monday      Prior note:   2 weeks ago BOTOX effect wore off   Used up all her percocet   3 wks h/o:   Reports frequent falls - falling forward, no LOC, no injuries, able to protect falls by hands   triggers - unknown   Also occ stumbling   Dragging L foot   No Pain in back or legs   No numbness or weakness in legs   No B/B incontinence   No  lightheadedness      Prior note:    Flare up of TMJ and HA during daughter's wedding   NSAIDS helped   2 weeks ago BOTOX effect wore off      Prior note:   2 weeks ago BOTOX effect wore off   Has severe spasm and pain in the traps catalina   Weather change has provoked severe migraine + nausea   She started coumadin       Prior note:   3 weeks ago BOTOX effect wore off   She reports severe daily HA    During BOTOX periods she feels she  her HA. Much less intense      Prior note:   The patient is a 51-year-old female who [facility]. Since her last visit, she reports growing frustration   about the fact that nothing has helped her with regards to her headaches. She   continues to experience daily headaches. She takes mefenamic acid and   tizanidine every night, which only provides her two hours of relief. She is   unable to sleep because of the refractory headaches. She is incapacitated   because of severe headaches. She reports minimal relief with infusions that she  gets on a periodic basis. She does report an improvement overall with the   Botox. However, this effect has worn off in the last two weeks. She also   reports an episode wherein she fell with loss of consciousness, which was not   provoked. As a result, she injured her ankle and is currently wearing a boot.      Prior note:   since last week - Reports vertigo for 6 - 7 minutes upto 3 times daily   Nearly daily severe HA - no response to ponstel , compazine   She is late for her BOTOX - last 2 weeks were painful       Follow up:   R sided Headache is constant max at TMJ on R   Prior note:   She reports new episodes of R face tingling. Sh also reports 2 episodes of blurred vision lasting 15 minutes. These hapended while watching TV. Her pain remains unchanged   Follow up:   2 days of severe HA during Thanksgiving   Pounding bifrontal region   Pain worse over L eye brow   Follow up:   Reports bifrontal  "severe HA (worse on L) with no nausea with sudden onset . Occurs daily   NO note:  I found no papilledema or other changes to suggest elevated intracranial pressure or other alternative etiology for her headaches. Repeat exam in two years.  HA are less frequent and less intense.   (R levator scapula spasm)   symptoms better with lateral flexion to L   Prior note:   She still has daily HA with severe sharp stabbing pain behind L eye lasting for 15 sec   Prior note:   Daily pain. 2/week - nausea.  Shu Lares RN at 9/17/2014 4:53 PM  Pt presented to clinic for medical advice. Pt is seen by Dr. Paredes and Dr. Cortez.  1) She went ER on 9/13/14 for a migraine. She was given Reglan, Benadryl, MSO4 and IVF. A couple days later she developed an all over body "tremor". She states "I think I have Parkinson's". - Per Dr. Paredes, medication related tremor (Reglan & Benadryl). Informed her it should wear off in a few days. If not, she is to call and let us know.  2) Headaches - triggered by insomnia.   Current meds:  Ketoprofen - she took it once and said her "throat closed up" and she's not supposed to have anything with aspirin in it.  Nortriptyline - she was taking it at night, but it didn't help her sleep, so she stopped it.  Per Dr. Cortez, discontinue Ketoprofen and Nortriptyline. Start Effexor XR 37.5mg QD, tizanidine 4mg BID PRN headache and Klonopin 0.25 - 0.5mg QHS PRN. Will schedule pt for sphenocath procedure within the next week.   Prior note:   46 yo woman with history of HTN and asthma, both reportedly controlled, in hospital early 2013 after "passed out" and became unresponsive. CPR in the field for 8 minutes until EMS arrived. Per ED note, on arrival she was found to be in PEA, given epinephrine. Vfib/Vtach was achieved which was shocked x1. Patient converted to sinus tachycardia after shock. I do not know the time course for the above treatment. On arrival to facility patient was in sinus tachycardia with " "strong pulses, intubated and unresponsive. ET tube placement was confirmed with direct laryngoscopy and good bilateral breath sounds were heard after the tube was pulled back 2 cm. Reported to have "withdrawal" movements in ER during stabilization, then sedated and cooling protocol initiated. Had remarkable   recovery and first seen in clinic in 2013.   Headache history: cluster   Age of onset -    Location - behind L eye   Nature of pain - sharp   Prodrome - no   Aura - No   Duration of headache - 6-7 min   Time to peak intensity -   Pain scale - range of intensity - 9/10   Frequency - daily   Status Migrainosus history - 1-3 days   Time of day of most headaches- anytime   Associated symptoms with the headache:   Red eyes   swelling of L face   Headache history: Migraine   Age of onset -    Location - bifrontal   Nature of pain - Pounding   Prodrome - no   Aura - No   Duration of headache - > 4 hrs   Time to peak intensity -   Pain scale - range of intensity - 9/10   Frequency - 5/week   Status Migrainosus history - 1-3 days   Time of day of most headaches- anytime   Associated symptoms with the headache:   Meningeal symptoms - photophobia, phonophobia, exercise intolerance +   Nausea/vomitting +   Nasal drainage   Visual blurriness +   Pallor/flushing   Dizziness   Vertigo   Confusion   Impaired concentration +   Pain worsened with physical activity +   Neck pain +   Symptoms of increased intracranial pressure:   Whooshing sounds - no   Visual spots/blotches - no   Headache Triggers: unknown   Other comorbid conditions:   Anxiety - no   Motion sickness symptom - no       Treatment history:   Resolution of headache with sleep - yes       Meds tried:   Trigger points involvin/9/15 helped for 1 day   Site: Right   Levator scapula   Pharmacological agent:   0.5% Sensorcaine solution   No complications were noted.  Supraorbital block - helped for 2 days   Tylenol - not help   imitrex - chest tightness " "  alleve - help   topamax - not helping   Neurontin - not helping   Paxil, Elavil - not help   seroquel - nightmare   Ketoprofen - she took it once and said her "throat closed up" and she's not supposed to have anything with aspirin in it.  Nortriptyline - she was taking it at night, but it didn't help her sleep, so she stopped it.  reglan - parkinsonism   zanaflex - help   Toradol 30 mg IM inj at home - too expensive   BOTOX round #1 9/3/15 - helped (R levator scapula spasm)   Round #2 12/3/15 - helped   Round#3 3/316 - helped   Round #4 6/1/16 - helped   BOTOX#5 9/15/16 - helped     BOTOX#6 - 11/30/16 - helped   BOTOX#7 - 3/9/17 - helped    BOTOX#8 - 5/25/17 - helped    BOTOX#9 8/10/17 - helped   BOTOX#10 10/19/17 - helped   BOTOX#11 12/27/17 - helped   BOTOX#12 3/7/18 - helped   BOTOX#13 5/16/18 - helped    BOTOX#14 8/1/18 - helped     BOTOX#15 10/19/18 - helped      BOTOX#16 12/28/18 - helped   BOTOX#17 3/13/19 - helped    BOTOX#18 5/23/19 - helped     BOTOX#19 8/1/19 - helped      BOTOX#20 10/11/19 - helped     BOTOX#21 12/19/19 - helped   BOTOX#22 3/10/20 - helped    BOTOX#23 6/26/20 - helped    AIMOVIG (sept 1rst week, Oct first week, Nov first wk 140 mg, Dec first wk 140 mg, Jan, Feb) no benefit   Constipation +      Can not do RFA - pt has pacemaker   Procedure: IOVERA peripheral nerve focus cold therapy (non ablative cryotherapy) 12/30/15 - not help   Indication: Cryotherapy of select peripheral nerves of the head was performed to treat chronic headaches that failed to respond to several preventive pharmacological therapies.   Sedation: None   Informed consent: The procedure, risks, benefits and options were discussed with the patient. There are no contraindications to the procedure. The patient expressed understanding and agreed to the procedure. I verify that I personally obtained the patient's consent prior to the start of the procedure.  Location:  Bilateral Supra orbital nerve (3 cycles on R and 1 " cycle on L)   Bilateral Occipital nerve   3 cycles   Pain relief: Pain score reduced 10/10->0/10   9/26 Bilateral Sphenopalatine Ganglion and bilateral Maxillary Branch (Trigeminal Nerve) Block - no help   ONB - not help   georges Pagan RN at 12/31/2014 3:54 PM                           Status: Signed                                       Expand All Collapse All   HEADACHE FASTTRACK  PAIN 7/10 NAUSEA 0/10  ROUND 1: TORADOL, IMITREX, MORPHINE, BENADRYL, AND MAGNESIUM  PAIN 7/10 NAUSEA 0/10  PT STATED SHE WAS READY TO GO HOME AND GO TO SLEEP. PT D/C'ED IN NAD                              Shannon Pagan RN at 10/5/2015 12:49 PM                           Status: Signed                                       Expand All Collapse All   HEADACHE FASTTRACK  PAIN 8/10 NAUSEA 10/10  FIRST ROUND: tORADOL, BENADRYL, COMPAZINE, IMITREX, MAGNESIUM, AND VALPROATE.  PAIN 1/10 NAUSEA 0/10  PT READY TO GO HOME AND FEELING BETTER. PT LEFT IN NAD WITH FAMILY MEMBER  TOTAL TIME: 5993-8703                         Shannon Pagan RN at 10/20/2015 3:05 PM                           Status: Signed                                       Expand All Collapse All   HEADACHE FASTTRACK  PAIN 10/10 NAUSEA 0/10  FIRST ROUND: tORADOL, BENADRYL, COMPAZINE, IMITREX, MAGNESIUM, AND VALPROATE.  BP BEING MONITORED EVERY 15 MIN AND REMAINED 170'S/80-90'S. DR CHOPRA NOTIFIED AND STATED TO MAKE SURE BP DID NOT EXCEED 180 SYSTOLIC OR PT SHOULD REPORT TO ED. BP AT 1500 183/92. DR CHOPRA NOTIFIED AND GAVE ORDER TO STOP INFUSION AND SEND PATIENT TO ED. PT BROUGHT TO ED BY INFUSION NURSE VIA WHEELCHAIR.   PAIN 8/10 NAUSEA 0/10  TOTAL TIME: 9889-5529                                                     Progress Notes                                               Shannon Pagan RN at 2/24/2016 1:36 PM       Status: Signed                  Expand All Collapse All   HEADACHE FASTTRACK  PAIN 10/10 NAUSEA 7/10  FIRST ROUND: tORADOL, BENADRYL, AND MORPHINE-BP RANGING FROM  177-203/84-92, HR 60-82  MD NOTIFIED AND GAVE ORDERS TO SEND TO ED. PT LEFT VIA W/C WITH INFUSION NURSE ON WAY TO ED.            Headache risk factors:   H/o TBI - [facility] (2013) + neck sprain   H/o Meningitis - no   F/h Aneurysms - no       Review of Systems   Constitutional: Negative.   HENT: Negative.   Eyes: Negative.   Respiratory: Negative.   Cardiovascular: Negative.   Gastrointestinal: Negative.   Endocrine: Negative.   Genitourinary: Negative.   Musculoskeletal: Negative.   Skin: Negative.   Allergic/Immunologic: Negative.   Hematological: Negative.   Psychiatric/Behavioral: insomnia      Objective:     Physical Exam   Constitutional: She is oriented to person, place, and time. She appears well-developed.   obesity   Psychiatric: She has a normal mood and flat affect. Her behavior is normal. Judgment and thought content normal.   Headache Exam:  Optic disc is flat OU   Temples appear symmetric  No V1,V2,V3 distribution allodynia/hyperalgesia catalina   No facial swelling  No gross TMJ abnormalities   No ptosis   No conj injection   No meningismus   No neck stiffness  No C spine tenderness  Cervical facet tenderness on palpation catalina   No tender points over trapezium   catalina supraorbital nerve exit point tenderness  catalina occipital nerve exit point tenderness  No auriculotemporal nerve tenderness   Tenderness of levator scapula catalina    Gait - wide based gait      sessment:                  1.  Occipital neuralgia                  2.  Cervicalgia                  3.  4  5  6 Chronic migraine without aura, with intractable migraine, so stated, with status migrainosus (post traumatic) post concussive?  Depression   TMJ - R sided   Insomnia - SHIRA      Head CT  Findings: There is no evidence of acute or recent major vascular territory cerebral infarction, parenchymal hemorrhage, or intra-axial mass.  There are no extra-axial masses or abnormal fluid collections.  The ventricular system is within normal limits of size for age  and shows no distortion by mass-effect or evidence of hydrocephalus.  There is no fracture or destructive osseous lesion.  The extracranial soft tissues are unremarkable.  The visualized paranasal sinuses and mastoid air cells are clear.   Impression    No acute intracranial abnormality.  ______________________________________     Electronically signed by resident: KRISSY MAYERS MD  Date: 03/14/16  Time: 17:09           Plan:                  1. Prophylactic medication -   Despiramine 25 mg AM (for dizziness) PRN  zoloft 25 mg daily    Aricept 20 mg daily   Neurontin 900 mg TID    Magnesium 200 mg daily  Topamax 200 mg BID   Cont B2, B6 supplements   2, Breakthrough headache - zanaflex 8 mg Q8, flurbiprofen   Multiple alternative treatment options were offered to the patient   3. Refrain from over counter pain medications. Discussed medication overuse headache.cont Magnesium 400 mg PO BID   4. Occipital neuralgia - If medical management is ineffective may consider occipital nerve blocks.   5. I again urged the patient to keep a headache calender.    f/up Dr. Rock for TBI   Vit D supplements    f/up sleep clinic - CPAP pending   Pain cream      Cont AJOVY (April 2019- ) No side effects      ONB 2 weeks prior to next BOTOX       BOTOX was performed as an indicated therapy for intractable chronic migraine headaches given that the patient failed > 5 prophylactic medications  Botulinum Toxin Injection Procedure   Pre-operative diagnosis: Chronic migraine   Post-operative diagnosis: Same   Procedure: Chemical neurolysis   After risks and benefits were explained including bleeding, infection, worsening of pain, damage to the areas being injected, weakness of muscles, loss of muscle control, dysphagia if injecting the head or neck, facial droop if injecting the facial area, painful injection, allergic or other reaction to the medications being injected, and the failure of the procedure to help the problem, a signed  consent was obtained.   The patient was placed in a comfortable area and the sites to be treated were identified.The area to be treated was prepped three times with alcohol and the alcohol allowed to dry. Next, a 30 gauge needle was used to inject the medication in the area to be treated.   Area(s) injected:   Total Botox used: 175 Units   Botox wastage: 25 Units   Injection sites:    muscle bilaterally ( a total of 10 units divided into 2 sites).   Procerus muscle (5 units)   Frontalis muscle bilaterally (a total of 20 units divided into 4 sites)   Temporalis muscle bilaterally (a total of 50 units divided into 8 sites)   Occipitalis muscle bilaterally (a total of 30 units divided into 6 sites)   Cervical paraspinal muscles (a total of 20 units divided into 4 sites)   Trapezius muscle bilaterally (a total of 30 units divided into 6 sites)   Masseter 5 units catalina      Toradol 60 mg IM x 1     Complications: none       RTC for the next Botox injection: 10 weeks

## 2020-11-13 ENCOUNTER — TELEPHONE (OUTPATIENT)
Dept: ELECTROPHYSIOLOGY | Facility: CLINIC | Age: 55
End: 2020-11-13

## 2020-11-13 ENCOUNTER — LAB VISIT (OUTPATIENT)
Dept: SURGERY | Facility: CLINIC | Age: 55
End: 2020-11-13
Payer: MEDICARE

## 2020-11-13 DIAGNOSIS — Z01.818 PRE-OP TESTING: ICD-10-CM

## 2020-11-13 LAB — SARS-COV-2 RNA RESP QL NAA+PROBE: NOT DETECTED

## 2020-11-13 PROCEDURE — U0003 INFECTIOUS AGENT DETECTION BY NUCLEIC ACID (DNA OR RNA); SEVERE ACUTE RESPIRATORY SYNDROME CORONAVIRUS 2 (SARS-COV-2) (CORONAVIRUS DISEASE [COVID-19]), AMPLIFIED PROBE TECHNIQUE, MAKING USE OF HIGH THROUGHPUT TECHNOLOGIES AS DESCRIBED BY CMS-2020-01-R: HCPCS

## 2020-11-13 NOTE — TELEPHONE ENCOUNTER
Spoke with Ms. Chawla. She said that Dr. Mejia called her and explained that her procedure needs to be rescheduled and she understands. I let her know her new date and that I would call her on Monday with further details. Patient appreciated call.

## 2020-11-16 DIAGNOSIS — T82.110D PACEMAKER LEAD MALFUNCTION, SUBSEQUENT ENCOUNTER: Primary | ICD-10-CM

## 2020-11-16 DIAGNOSIS — Z01.818 PRE-OP TESTING: ICD-10-CM

## 2020-11-23 ENCOUNTER — PATIENT OUTREACH (OUTPATIENT)
Dept: OTHER | Facility: OTHER | Age: 55
End: 2020-11-23

## 2020-11-24 ENCOUNTER — PATIENT MESSAGE (OUTPATIENT)
Dept: ELECTROPHYSIOLOGY | Facility: CLINIC | Age: 55
End: 2020-11-24

## 2020-11-27 ENCOUNTER — CLINICAL SUPPORT (OUTPATIENT)
Dept: CARDIOLOGY | Facility: HOSPITAL | Age: 55
End: 2020-11-27
Payer: MEDICARE

## 2020-11-27 DIAGNOSIS — Z95.810 PRESENCE OF AUTOMATIC (IMPLANTABLE) CARDIAC DEFIBRILLATOR: ICD-10-CM

## 2020-11-27 PROCEDURE — 93295 DEV INTERROG REMOTE 1/2/MLT: CPT | Mod: ,,, | Performed by: INTERNAL MEDICINE

## 2020-11-27 PROCEDURE — 93295 CARDIAC DEVICE CHECK - REMOTE: ICD-10-PCS | Mod: ,,, | Performed by: INTERNAL MEDICINE

## 2020-11-27 PROCEDURE — 93296 REM INTERROG EVL PM/IDS: CPT | Performed by: INTERNAL MEDICINE

## 2020-11-30 ENCOUNTER — EXTERNAL CHRONIC CARE MANAGEMENT (OUTPATIENT)
Dept: PRIMARY CARE CLINIC | Facility: CLINIC | Age: 55
End: 2020-11-30
Payer: MEDICARE

## 2020-11-30 PROCEDURE — 99487 PR COMPLX CHRON CARE MGMT, 1ST HR, PER MONTH: ICD-10-PCS | Mod: S$PBB,,, | Performed by: INTERNAL MEDICINE

## 2020-11-30 PROCEDURE — 99489 CPLX CHRNC CARE EA ADDL 30: CPT | Mod: S$PBB,,, | Performed by: INTERNAL MEDICINE

## 2020-11-30 PROCEDURE — 99487 CPLX CHRNC CARE 1ST 60 MIN: CPT | Mod: PBBFAC,25,27 | Performed by: INTERNAL MEDICINE

## 2020-11-30 PROCEDURE — 99489 CPLX CHRNC CARE EA ADDL 30: CPT | Mod: PBBFAC | Performed by: INTERNAL MEDICINE

## 2020-11-30 PROCEDURE — 99489 PR COMPLX CHRON CARE MGMT, EA ADDTL 30 MIN, PER MONTH: ICD-10-PCS | Mod: S$PBB,,, | Performed by: INTERNAL MEDICINE

## 2020-11-30 PROCEDURE — 99487 CPLX CHRNC CARE 1ST 60 MIN: CPT | Mod: S$PBB,,, | Performed by: INTERNAL MEDICINE

## 2020-12-04 ENCOUNTER — LAB VISIT (OUTPATIENT)
Dept: LAB | Facility: HOSPITAL | Age: 55
End: 2020-12-04
Attending: INTERNAL MEDICINE
Payer: MEDICARE

## 2020-12-04 ENCOUNTER — TELEPHONE (OUTPATIENT)
Dept: ELECTROPHYSIOLOGY | Facility: CLINIC | Age: 55
End: 2020-12-04

## 2020-12-04 ENCOUNTER — SPECIALTY PHARMACY (OUTPATIENT)
Dept: PHARMACY | Facility: CLINIC | Age: 55
End: 2020-12-04

## 2020-12-04 ENCOUNTER — LAB VISIT (OUTPATIENT)
Dept: INTERNAL MEDICINE | Facility: CLINIC | Age: 55
End: 2020-12-04
Payer: MEDICARE

## 2020-12-04 DIAGNOSIS — Z01.812 PRE-PROCEDURE LAB EXAM: ICD-10-CM

## 2020-12-04 DIAGNOSIS — T82.110D PACEMAKER LEAD MALFUNCTION, SUBSEQUENT ENCOUNTER: ICD-10-CM

## 2020-12-04 LAB
ABO + RH BLD: NORMAL
BLD GP AB SCN CELLS X3 SERPL QL: NORMAL

## 2020-12-04 PROCEDURE — 36415 COLL VENOUS BLD VENIPUNCTURE: CPT

## 2020-12-04 PROCEDURE — 86901 BLOOD TYPING SEROLOGIC RH(D): CPT

## 2020-12-04 PROCEDURE — U0003 INFECTIOUS AGENT DETECTION BY NUCLEIC ACID (DNA OR RNA); SEVERE ACUTE RESPIRATORY SYNDROME CORONAVIRUS 2 (SARS-COV-2) (CORONAVIRUS DISEASE [COVID-19]), AMPLIFIED PROBE TECHNIQUE, MAKING USE OF HIGH THROUGHPUT TECHNOLOGIES AS DESCRIBED BY CMS-2020-01-R: HCPCS

## 2020-12-04 NOTE — TELEPHONE ENCOUNTER
Spoke to Ms. Chawla    CONFIRMED procedure arrival time of 5:15AM    Reiterated instructions including:  -Directions to check in desk  -NPO after midnight night prior to procedure  -High importance of HOLDING Xarelto last dose 12/3, Metformin day of procedure   -Pre-procedure LABS today, pending  -COVID test today, pending  -Confirmed no fever, cough, or shortness of breath in the past 30 days  -Confirmed no redness, rash, irritation, or yeast infection to groin area.   -Confirmed Contrast Prep Instructions of taking Benadryl 50 mg, Tagamet 300 mg, and Prednisone 50 mg 13 hrs, 7 hrs, and 1 hr prior to procedure  -Do not wear mascara day of procedure  -Bathe night prior and morning prior to procedure with Hibiclens solution or an antibacterial soap  -Reviewed current visitor policy    Patient verbalizes understanding of above and appreciates call.    CT sx- Claudio  Type and match today, PRBC order in.

## 2020-12-04 NOTE — TELEPHONE ENCOUNTER
Specialty Pharmacy - Refill Coordination    Specialty Medication Orders Linked to Encounter      Most Recent Value   Medication #1  fremanezumab-vfrm (AJOVY) 225 mg/1.5 mL injection (Order#[id number], Rx#[id number])          Refill Questions - Documented Responses      Most Recent Value   Relationship to patient of person spoken to?  Self   HIPAA/medical authority confirmed?  Yes   How will the patient receive the medication?  Mail   When does the patient need to receive the medication?  12/10/20   Shipping Address  Home   Address in [facility] confirmed and updated if neccessary?  Yes   Expected Copay ($)  0   Is the patient able to afford the medication copay?  Yes   Payment Method  zero copay   Days supply of Refill  28   Would patient like to speak to a pharmacist?  No   Do you want to trigger an intervention?  No   Do you want to trigger an additional referral task?  No   Refill activity completed?  Yes   Refill activity plan  Refill scheduled   Shipment/Pickup Date:  12/08/20          Current Outpatient Medications   Medication Sig    ARIPiprazole (ABILIFY) 15 MG Tab Take 1 tablet (15 mg total) by mouth once daily.    blood sugar diagnostic Strp 1 strip by Misc.(Non-Drug; Combo Route) route 2 (two) times daily.    blood-glucose meter kit Please provide with insurance covered meter    carvedilol (COREG) 25 MG tablet TAKE 1 TABLET BY MOUTH TWICE DAILY, WITH MEALS    cimetidine (TAGAMET) 300 MG tablet Take 300 mg 13 hours prior, 7 hours prior, and 1 hour prior to procedure    clonazePAM (KLONOPIN) 1 MG tablet Take 1 tablet (1 mg total) by mouth daily as needed for Anxiety.    clopidogreL (PLAVIX) 75 mg tablet Take 75 mg by mouth once daily.    cyanocobalamin, vitamin B-12, 1,000 mcg Subl Place 2,000 mcg under the tongue once daily.    diclofenac sodium (VOLTAREN) 1 % Gel Apply 2 g topically 4 (four) times daily.    diphenhydrAMINE (BENADRYL) 25 mg capsule Take 2 capsules (50 mg total) by mouth as  needed for Itching (Take 50 mg 13 hours prior, 7 hours prior, and 1 hour prior to procedure).    donepezil (ARICEPT) 10 MG tablet Take 1 tablet (10 mg total) by mouth 2 (two) times daily.    ergocalciferol (ERGOCALCIFEROL) 50,000 unit Cap Take 1 capsule (50,000 Units total) by mouth twice a week.    flurbiprofen (ANSAID) 100 MG tablet Take 1 tablet (100 mg total) by mouth 2 (two) times daily as needed (migraine).    fremanezumab-vfrm (AJOVY) 225 mg/1.5 mL injection Inject 1.5 mLs (225 mg total) into the skin every 28 days.    furosemide (LASIX) 20 MG tablet Take 1 tablet (20 mg total) by mouth once daily.    gabapentin (NEURONTIN) 300 MG capsule Take 3 capsules (900 mg total) by mouth 3 (three) times daily.    lancets Misc 1 Device by Misc.(Non-Drug; Combo Route) route 2 (two) times daily.    lidocaine (LIDODERM) 5 % Place 1 patch onto the skin once daily. Remove & Discard patch within 12 hours or as directed by MD    losartan (COZAAR) 100 MG tablet Take 1 tablet (100 mg total) by mouth once daily.    magnesium 200 mg Tab Take 200 mg by mouth once daily. (Patient taking differently: Take 200 mg by mouth every other day. )    metFORMIN (GLUCOPHAGE) 1000 MG tablet Take 1 tablet (1,000 mg total) by mouth 2 (two) times daily with meals. for 7 days    mupirocin (BACTROBAN) 2 % ointment Apply to affected area 3 times daily    ondansetron (ZOFRAN-ODT) 4 MG TbDL Take 1 tablet (4 mg total) by mouth every 12 (twelve) hours as needed (nausea).    pantoprazole (PROTONIX) 40 MG tablet Take 1 tablet (40 mg total) by mouth once daily.    rosuvastatin (CRESTOR) 40 MG Tab Take 1 tablet (40 mg total) by mouth every evening.    sertraline (ZOLOFT) 25 MG tablet Take 1 tablet (25 mg total) by mouth once daily.    silver sulfADIAZINE 1% (SILVADENE) 1 % cream Apply topically 2 (two) times daily.    tiaGABine (GABITRIL) 4 MG tablet Take 1 tablet (4 mg total) by mouth every evening.    tiZANidine (ZANAFLEX) 4 MG tablet  Take 1 tablet (4 mg total) by mouth every 6 (six) hours as needed.    TRUE METRIX GLUCOSE METER Misc TEST BG BID    ubrogepant (UBRELVY)tablet 100 mg Take 1 tablet (100 mg total) by mouth 2 (two) times daily as needed for Migraine.    XARELTO 20 mg Tab TAKE 1 TABLET BY MOUTH DAILY WITH DINNER OR EVENING MEAL    zolpidem (AMBIEN) 10 mg Tab Take 1 tablet (10 mg total) by mouth nightly as needed.   Last reviewed on 11/12/2020 11:33 AM by David Cortez MD    Review of patient's allergies indicates:   Allergen Reactions    Aspirin Hives    Imitrex [sumatriptan] Palpitations    Penicillins Hives and Swelling    Shellfish containing products Anaphylaxis     seafood    Percocet [oxycodone-acetaminophen] Itching     States can take Hydrocodone    Reglan [metoclopramide hcl] Other (See Comments)     Parkinsonism     Last reviewed on  11/12/2020 11:33 AM by David Cortez      Tasks added this encounter   12/27/2020 - Refill Call (Auto Added)   Tasks due within next 3 months   No tasks due.     Shirley Fung  [facility]  [address] [place] [id number]  Phone: [phone]  Fax: [phone]

## 2020-12-05 LAB — SARS-COV-2 RNA RESP QL NAA+PROBE: NOT DETECTED

## 2020-12-07 ENCOUNTER — HOSPITAL ENCOUNTER (OUTPATIENT)
Dept: CARDIOLOGY | Facility: HOSPITAL | Age: 55
Discharge: HOME | End: 2020-12-07
Attending: INTERNAL MEDICINE | Admitting: INTERNAL MEDICINE
Payer: MEDICARE

## 2020-12-07 ENCOUNTER — HOSPITAL ENCOUNTER (OUTPATIENT)
Facility: HOSPITAL | Age: 55
Discharge: HOME | End: 2020-12-08
Attending: INTERNAL MEDICINE | Admitting: INTERNAL MEDICINE
Payer: MEDICARE

## 2020-12-07 ENCOUNTER — ANESTHESIA (OUTPATIENT)
Dept: MEDSURG UNIT | Facility: HOSPITAL | Age: 55
End: 2020-12-07
Payer: MEDICARE

## 2020-12-07 ENCOUNTER — ANESTHESIA EVENT (OUTPATIENT)
Dept: MEDSURG UNIT | Facility: HOSPITAL | Age: 55
End: 2020-12-07
Payer: MEDICARE

## 2020-12-07 VITALS
DIASTOLIC BLOOD PRESSURE: 74 MMHG | SYSTOLIC BLOOD PRESSURE: 166 MMHG | HEIGHT: 64 IN | WEIGHT: 286 LBS | BODY MASS INDEX: 48.83 KG/M2

## 2020-12-07 DIAGNOSIS — T82.110D PACEMAKER LEAD MALFUNCTION, SUBSEQUENT ENCOUNTER: ICD-10-CM

## 2020-12-07 DIAGNOSIS — Z01.89 ENCOUNTER FOR OTHER SPECIFIED SPECIAL EXAMINATIONS: ICD-10-CM

## 2020-12-07 DIAGNOSIS — Z95.9 CARDIAC DEVICE IN SITU: ICD-10-CM

## 2020-12-07 DIAGNOSIS — I49.9 CARDIAC ARRHYTHMIA, UNSPECIFIED CARDIAC ARRHYTHMIA TYPE: ICD-10-CM

## 2020-12-07 DIAGNOSIS — T82.110A PACEMAKER LEAD MALFUNCTION: ICD-10-CM

## 2020-12-07 LAB
ABO + RH BLD: NORMAL
BASOPHILS # BLD AUTO: 0 K/UL (ref 0–0.2)
BASOPHILS NFR BLD: 0 % (ref 0–1.9)
BLD GP AB SCN CELLS X3 SERPL QL: NORMAL
BSA FOR ECHO PROCEDURE: 2.42 M2
DIFFERENTIAL METHOD: ABNORMAL
EOSINOPHIL # BLD AUTO: 0 K/UL (ref 0–0.5)
EOSINOPHIL NFR BLD: 0 % (ref 0–8)
ERYTHROCYTE [DISTWIDTH] IN BLOOD BY AUTOMATED COUNT: 15.3 % (ref 11.5–14.5)
HCT VFR BLD AUTO: 34.8 % (ref 37–48.5)
HGB BLD-MCNC: 11.4 G/DL (ref 12–16)
IMM GRANULOCYTES # BLD AUTO: 0.01 K/UL (ref 0–0.04)
IMM GRANULOCYTES NFR BLD AUTO: 0.2 % (ref 0–0.5)
LYMPHOCYTES # BLD AUTO: 0.7 K/UL (ref 1–4.8)
LYMPHOCYTES NFR BLD: 15.9 % (ref 18–48)
MCH RBC QN AUTO: 30 PG (ref 27–31)
MCHC RBC AUTO-ENTMCNC: 32.8 G/DL (ref 32–36)
MCV RBC AUTO: 92 FL (ref 82–98)
MONOCYTES # BLD AUTO: 0 K/UL (ref 0.3–1)
MONOCYTES NFR BLD: 0.7 % (ref 4–15)
NEUTROPHILS # BLD AUTO: 3.6 K/UL (ref 1.8–7.7)
NEUTROPHILS NFR BLD: 83.2 % (ref 38–73)
NRBC BLD-RTO: 0 /100 WBC
PLATELET # BLD AUTO: 227 K/UL (ref 150–350)
PMV BLD AUTO: 11.9 FL (ref 9.2–12.9)
POCT GLUCOSE: 132 MG/DL (ref 70–110)
RBC # BLD AUTO: 3.8 M/UL (ref 4–5.4)
WBC # BLD AUTO: 4.33 K/UL (ref 3.9–12.7)

## 2020-12-07 PROCEDURE — 25000003 PHARM REV CODE 250: Performed by: NURSE PRACTITIONER

## 2020-12-07 PROCEDURE — 93005 ELECTROCARDIOGRAM TRACING: CPT | Mod: 59

## 2020-12-07 PROCEDURE — 75820 VEIN X-RAY ARM/LEG: CPT | Performed by: INTERNAL MEDICINE

## 2020-12-07 PROCEDURE — C1751 CATH, INF, PER/CENT/MIDLINE: HCPCS | Performed by: INTERNAL MEDICINE

## 2020-12-07 PROCEDURE — 25000003 PHARM REV CODE 250: Performed by: INTERNAL MEDICINE

## 2020-12-07 PROCEDURE — C1769 GUIDE WIRE: HCPCS | Performed by: INTERNAL MEDICINE

## 2020-12-07 PROCEDURE — 37000008 HC ANESTHESIA 1ST 15 MINUTES: Performed by: INTERNAL MEDICINE

## 2020-12-07 PROCEDURE — 93010 ELECTROCARDIOGRAM REPORT: CPT | Mod: 76,,, | Performed by: INTERNAL MEDICINE

## 2020-12-07 PROCEDURE — D9220A PRA ANESTHESIA: ICD-10-PCS | Mod: ANES,,, | Performed by: ANESTHESIOLOGY

## 2020-12-07 PROCEDURE — 27000239 HC STAND-BY BYPASS PUMP

## 2020-12-07 PROCEDURE — 93312 ECHO TRANSESOPHAGEAL: CPT | Mod: 26,,, | Performed by: INTERNAL MEDICINE

## 2020-12-07 PROCEDURE — 85025 COMPLETE CBC W/AUTO DIFF WBC: CPT

## 2020-12-07 PROCEDURE — 27201423 OPTIME MED/SURG SUP & DEVICES STERILE SUPPLY: Performed by: INTERNAL MEDICINE

## 2020-12-07 PROCEDURE — 82962 GLUCOSE BLOOD TEST: CPT | Performed by: INTERNAL MEDICINE

## 2020-12-07 PROCEDURE — 63600175 PHARM REV CODE 636 W HCPCS: Performed by: ANESTHESIOLOGY

## 2020-12-07 PROCEDURE — 86901 BLOOD TYPING SEROLOGIC RH(D): CPT

## 2020-12-07 PROCEDURE — 33244 PR RMV PACER/ELECTRD,TRANSVEN EXTRCT: ICD-10-PCS | Mod: 22,,, | Performed by: INTERNAL MEDICINE

## 2020-12-07 PROCEDURE — 33264 RMVL & RPLCMT DFB GEN MLT LD: CPT | Mod: 22,51,, | Performed by: INTERNAL MEDICINE

## 2020-12-07 PROCEDURE — 33225 PR INSRT PACING ELECT,AT INSERT NEW DEVICE: ICD-10-PCS | Mod: ,,, | Performed by: INTERNAL MEDICINE

## 2020-12-07 PROCEDURE — 93312 TRANSESOPHAGEAL ECHO (TEE) (CUPID ONLY): ICD-10-PCS | Mod: 26,,, | Performed by: INTERNAL MEDICINE

## 2020-12-07 PROCEDURE — 36620 PR INSERT CATH,ART,PERCUT,SHORTTERM: ICD-10-PCS | Mod: 59,,, | Performed by: ANESTHESIOLOGY

## 2020-12-07 PROCEDURE — 33264 RMVL & RPLCMT DFB GEN MLT LD: CPT | Performed by: INTERNAL MEDICINE

## 2020-12-07 PROCEDURE — 93320 DOPPLER ECHO COMPLETE: CPT | Mod: 26,,, | Performed by: INTERNAL MEDICINE

## 2020-12-07 PROCEDURE — 36620 INSERTION CATHETER ARTERY: CPT | Mod: 59,,, | Performed by: ANESTHESIOLOGY

## 2020-12-07 PROCEDURE — C1894 INTRO/SHEATH, NON-LASER: HCPCS | Performed by: INTERNAL MEDICINE

## 2020-12-07 PROCEDURE — C1882 AICD, OTHER THAN SING/DUAL: HCPCS | Performed by: INTERNAL MEDICINE

## 2020-12-07 PROCEDURE — 33244 REMOVE ELCTRD TRANSVENOUSLY: CPT | Mod: 22,,, | Performed by: INTERNAL MEDICINE

## 2020-12-07 PROCEDURE — C1898 LEAD, PMKR, OTHER THAN TRANS: HCPCS | Performed by: INTERNAL MEDICINE

## 2020-12-07 PROCEDURE — 63600175 PHARM REV CODE 636 W HCPCS: Performed by: INTERNAL MEDICINE

## 2020-12-07 PROCEDURE — D9220A PRA ANESTHESIA: Mod: CRNA,,, | Performed by: NURSE ANESTHETIST, CERTIFIED REGISTERED

## 2020-12-07 PROCEDURE — 63600175 PHARM REV CODE 636 W HCPCS: Performed by: NURSE PRACTITIONER

## 2020-12-07 PROCEDURE — 93010 EKG 12-LEAD: ICD-10-PCS | Mod: ,,, | Performed by: INTERNAL MEDICINE

## 2020-12-07 PROCEDURE — 75820 PR VENOGRAM EXTREMITY UNILAT: ICD-10-PCS | Mod: 26,,, | Performed by: INTERNAL MEDICINE

## 2020-12-07 PROCEDURE — 33264 PR REMV&REPLC CVD GEN MULT LEAD: ICD-10-PCS | Mod: 22,51,, | Performed by: INTERNAL MEDICINE

## 2020-12-07 PROCEDURE — 93320 TRANSESOPHAGEAL ECHO (TEE) (CUPID ONLY): ICD-10-PCS | Mod: 26,,, | Performed by: INTERNAL MEDICINE

## 2020-12-07 PROCEDURE — 93325 TRANSESOPHAGEAL ECHO (TEE) (CUPID ONLY): ICD-10-PCS | Mod: 26,,, | Performed by: INTERNAL MEDICINE

## 2020-12-07 PROCEDURE — 33225 L VENTRIC PACING LEAD ADD-ON: CPT | Performed by: INTERNAL MEDICINE

## 2020-12-07 PROCEDURE — 93325 DOPPLER ECHO COLOR FLOW MAPG: CPT

## 2020-12-07 PROCEDURE — 33244 REMOVE ELCTRD TRANSVENOUSLY: CPT | Performed by: INTERNAL MEDICINE

## 2020-12-07 PROCEDURE — 93325 DOPPLER ECHO COLOR FLOW MAPG: CPT | Mod: 26,,, | Performed by: INTERNAL MEDICINE

## 2020-12-07 PROCEDURE — D9220A PRA ANESTHESIA: ICD-10-PCS | Mod: CRNA,,, | Performed by: NURSE ANESTHETIST, CERTIFIED REGISTERED

## 2020-12-07 PROCEDURE — 63600175 PHARM REV CODE 636 W HCPCS: Performed by: NURSE ANESTHETIST, CERTIFIED REGISTERED

## 2020-12-07 PROCEDURE — 25000003 PHARM REV CODE 250: Performed by: NURSE ANESTHETIST, CERTIFIED REGISTERED

## 2020-12-07 PROCEDURE — 33225 L VENTRIC PACING LEAD ADD-ON: CPT | Mod: ,,, | Performed by: INTERNAL MEDICINE

## 2020-12-07 PROCEDURE — 75820 VEIN X-RAY ARM/LEG: CPT | Mod: 26,,, | Performed by: INTERNAL MEDICINE

## 2020-12-07 PROCEDURE — C1893 INTRO/SHEATH, FIXED,NON-PEEL: HCPCS | Performed by: INTERNAL MEDICINE

## 2020-12-07 PROCEDURE — 86920 COMPATIBILITY TEST SPIN: CPT | Mod: 59

## 2020-12-07 PROCEDURE — 99499 UNLISTED E&M SERVICE: CPT | Mod: ,,, | Performed by: STUDENT IN AN ORGANIZED HEALTH CARE EDUCATION/TRAINING PROGRAM

## 2020-12-07 PROCEDURE — 99499 NO LOS: ICD-10-PCS | Mod: ,,, | Performed by: STUDENT IN AN ORGANIZED HEALTH CARE EDUCATION/TRAINING PROGRAM

## 2020-12-07 PROCEDURE — D9220A PRA ANESTHESIA: Mod: ANES,,, | Performed by: ANESTHESIOLOGY

## 2020-12-07 PROCEDURE — 37000009 HC ANESTHESIA EA ADD 15 MINS: Performed by: INTERNAL MEDICINE

## 2020-12-07 DEVICE — IMPLANTABLE DEVICE
Type: IMPLANTABLE DEVICE | Site: HEART | Status: FUNCTIONAL
Brand: RIVACOR 7 HF-T QP

## 2020-12-07 DEVICE — IMPLANTABLE DEVICE
Type: IMPLANTABLE DEVICE | Site: HEART | Status: FUNCTIONAL
Brand: SENTUS

## 2020-12-07 RX ORDER — DONEPEZIL HYDROCHLORIDE 5 MG/1
10 TABLET, FILM COATED ORAL NIGHTLY
Status: DISCONTINUED | OUTPATIENT
Start: 2020-12-07 | End: 2020-12-08 | Stop reason: HOSPADM

## 2020-12-07 RX ORDER — BUPIVACAINE HYDROCHLORIDE 2.5 MG/ML
INJECTION, SOLUTION EPIDURAL; INFILTRATION; INTRACAUDAL
Status: DISCONTINUED | OUTPATIENT
Start: 2020-12-07 | End: 2020-12-07 | Stop reason: HOSPADM

## 2020-12-07 RX ORDER — CARVEDILOL 25 MG/1
25 TABLET ORAL 2 TIMES DAILY
Status: DISCONTINUED | OUTPATIENT
Start: 2020-12-07 | End: 2020-12-08 | Stop reason: HOSPADM

## 2020-12-07 RX ORDER — TIAGABINE HYDROCHLORIDE 4 MG/1
4 TABLET, FILM COATED ORAL NIGHTLY
Status: DISCONTINUED | OUTPATIENT
Start: 2020-12-07 | End: 2020-12-07

## 2020-12-07 RX ORDER — CARVEDILOL 25 MG/1
25 TABLET ORAL 2 TIMES DAILY
Status: DISCONTINUED | OUTPATIENT
Start: 2020-12-07 | End: 2020-12-07

## 2020-12-07 RX ORDER — PANTOPRAZOLE SODIUM 40 MG/1
40 TABLET, DELAYED RELEASE ORAL DAILY
Status: DISCONTINUED | OUTPATIENT
Start: 2020-12-08 | End: 2020-12-08 | Stop reason: HOSPADM

## 2020-12-07 RX ORDER — PROPOFOL 10 MG/ML
VIAL (ML) INTRAVENOUS
Status: DISCONTINUED | OUTPATIENT
Start: 2020-12-07 | End: 2020-12-07

## 2020-12-07 RX ORDER — ROSUVASTATIN CALCIUM 20 MG/1
40 TABLET, COATED ORAL NIGHTLY
Status: DISCONTINUED | OUTPATIENT
Start: 2020-12-07 | End: 2020-12-08 | Stop reason: HOSPADM

## 2020-12-07 RX ORDER — SODIUM CHLORIDE 0.9 G/100ML
IRRIGANT IRRIGATION
Status: DISCONTINUED | OUTPATIENT
Start: 2020-12-07 | End: 2020-12-07 | Stop reason: HOSPADM

## 2020-12-07 RX ORDER — MIDAZOLAM HYDROCHLORIDE 1 MG/ML
INJECTION, SOLUTION INTRAMUSCULAR; INTRAVENOUS
Status: DISCONTINUED | OUTPATIENT
Start: 2020-12-07 | End: 2020-12-07

## 2020-12-07 RX ORDER — HYDROMORPHONE HYDROCHLORIDE 1 MG/ML
0.2 INJECTION, SOLUTION INTRAMUSCULAR; INTRAVENOUS; SUBCUTANEOUS EVERY 5 MIN PRN
Status: DISCONTINUED | OUTPATIENT
Start: 2020-12-07 | End: 2020-12-07 | Stop reason: HOSPADM

## 2020-12-07 RX ORDER — VANCOMYCIN HYDROCHLORIDE 1 G/20ML
INJECTION, POWDER, LYOPHILIZED, FOR SOLUTION INTRAVENOUS
Status: DISCONTINUED | OUTPATIENT
Start: 2020-12-07 | End: 2020-12-07 | Stop reason: HOSPADM

## 2020-12-07 RX ORDER — ARIPIPRAZOLE 5 MG/1
15 TABLET ORAL DAILY
Status: DISCONTINUED | OUTPATIENT
Start: 2020-12-08 | End: 2020-12-08 | Stop reason: HOSPADM

## 2020-12-07 RX ORDER — ACETAMINOPHEN 325 MG/1
650 TABLET ORAL EVERY 4 HOURS PRN
Status: DISCONTINUED | OUTPATIENT
Start: 2020-12-07 | End: 2020-12-08 | Stop reason: HOSPADM

## 2020-12-07 RX ORDER — TIAGABINE HYDROCHLORIDE 4 MG/1
4 TABLET, FILM COATED ORAL DAILY PRN
Status: DISCONTINUED | OUTPATIENT
Start: 2020-12-07 | End: 2020-12-08 | Stop reason: HOSPADM

## 2020-12-07 RX ORDER — ROCURONIUM BROMIDE 10 MG/ML
INJECTION, SOLUTION INTRAVENOUS
Status: DISCONTINUED | OUTPATIENT
Start: 2020-12-07 | End: 2020-12-07

## 2020-12-07 RX ORDER — PREDNISONE 50 MG/1
TABLET ORAL
COMMUNITY
End: 2021-01-21

## 2020-12-07 RX ORDER — CLOPIDOGREL BISULFATE 75 MG/1
75 TABLET ORAL DAILY
Status: DISCONTINUED | OUTPATIENT
Start: 2020-12-08 | End: 2020-12-08 | Stop reason: HOSPADM

## 2020-12-07 RX ORDER — DIPHENHYDRAMINE HYDROCHLORIDE 50 MG/ML
25 INJECTION INTRAMUSCULAR; INTRAVENOUS EVERY 6 HOURS PRN
Status: DISCONTINUED | OUTPATIENT
Start: 2020-12-07 | End: 2020-12-07 | Stop reason: HOSPADM

## 2020-12-07 RX ORDER — FENTANYL CITRATE 50 UG/ML
INJECTION, SOLUTION INTRAMUSCULAR; INTRAVENOUS
Status: DISCONTINUED | OUTPATIENT
Start: 2020-12-07 | End: 2020-12-07

## 2020-12-07 RX ORDER — DOXYCYCLINE HYCLATE 100 MG
100 TABLET ORAL EVERY 12 HOURS
Status: DISCONTINUED | OUTPATIENT
Start: 2020-12-07 | End: 2020-12-08 | Stop reason: HOSPADM

## 2020-12-07 RX ORDER — LIDOCAINE HYDROCHLORIDE 20 MG/ML
INJECTION, SOLUTION EPIDURAL; INFILTRATION; INTRACAUDAL; PERINEURAL
Status: DISCONTINUED | OUTPATIENT
Start: 2020-12-07 | End: 2020-12-07 | Stop reason: HOSPADM

## 2020-12-07 RX ORDER — HYDROCODONE BITARTRATE AND ACETAMINOPHEN 500; 5 MG/1; MG/1
TABLET ORAL
Status: DISCONTINUED | OUTPATIENT
Start: 2020-12-07 | End: 2020-12-07

## 2020-12-07 RX ORDER — LOSARTAN POTASSIUM 50 MG/1
100 TABLET ORAL DAILY
Status: DISCONTINUED | OUTPATIENT
Start: 2020-12-08 | End: 2020-12-08 | Stop reason: HOSPADM

## 2020-12-07 RX ORDER — SODIUM CHLORIDE 9 MG/ML
INJECTION, SOLUTION INTRAVENOUS CONTINUOUS PRN
Status: DISCONTINUED | OUTPATIENT
Start: 2020-12-07 | End: 2020-12-07

## 2020-12-07 RX ORDER — GABAPENTIN 300 MG/1
900 CAPSULE ORAL 3 TIMES DAILY
Status: DISCONTINUED | OUTPATIENT
Start: 2020-12-07 | End: 2020-12-08 | Stop reason: HOSPADM

## 2020-12-07 RX ORDER — FENTANYL CITRATE 50 UG/ML
25 INJECTION, SOLUTION INTRAMUSCULAR; INTRAVENOUS EVERY 5 MIN PRN
Status: COMPLETED | OUTPATIENT
Start: 2020-12-07 | End: 2020-12-07

## 2020-12-07 RX ORDER — FUROSEMIDE 20 MG/1
20 TABLET ORAL DAILY
Status: DISCONTINUED | OUTPATIENT
Start: 2020-12-08 | End: 2020-12-08 | Stop reason: HOSPADM

## 2020-12-07 RX ORDER — AMLODIPINE BESYLATE 5 MG/1
5 TABLET ORAL DAILY
Status: DISCONTINUED | OUTPATIENT
Start: 2020-12-07 | End: 2020-12-08 | Stop reason: HOSPADM

## 2020-12-07 RX ORDER — ONDANSETRON 2 MG/ML
4 INJECTION INTRAMUSCULAR; INTRAVENOUS ONCE AS NEEDED
Status: DISCONTINUED | OUTPATIENT
Start: 2020-12-07 | End: 2020-12-07 | Stop reason: HOSPADM

## 2020-12-07 RX ORDER — LIDOCAINE HYDROCHLORIDE 20 MG/ML
INJECTION, SOLUTION EPIDURAL; INFILTRATION; INTRACAUDAL; PERINEURAL
Status: DISCONTINUED | OUTPATIENT
Start: 2020-12-07 | End: 2020-12-07

## 2020-12-07 RX ORDER — ONDANSETRON 2 MG/ML
INJECTION INTRAMUSCULAR; INTRAVENOUS
Status: DISCONTINUED | OUTPATIENT
Start: 2020-12-07 | End: 2020-12-07

## 2020-12-07 RX ADMIN — FENTANYL CITRATE 25 MCG: 50 INJECTION, SOLUTION INTRAMUSCULAR; INTRAVENOUS at 01:12

## 2020-12-07 RX ADMIN — SODIUM CHLORIDE: 0.9 INJECTION, SOLUTION INTRAVENOUS at 07:12

## 2020-12-07 RX ADMIN — CARVEDILOL 25 MG: 25 TABLET, FILM COATED ORAL at 06:12

## 2020-12-07 RX ADMIN — ROCURONIUM BROMIDE 20 MG: 10 INJECTION, SOLUTION INTRAVENOUS at 09:12

## 2020-12-07 RX ADMIN — FENTANYL CITRATE 50 MCG: 50 INJECTION INTRAMUSCULAR; INTRAVENOUS at 07:12

## 2020-12-07 RX ADMIN — VANCOMYCIN HYDROCHLORIDE 2000 MG: 10 INJECTION, POWDER, LYOPHILIZED, FOR SOLUTION INTRAVENOUS at 06:12

## 2020-12-07 RX ADMIN — ONDANSETRON 4 MG: 2 INJECTION INTRAMUSCULAR; INTRAVENOUS at 11:12

## 2020-12-07 RX ADMIN — PROPOFOL 20 MG: 10 INJECTION, EMULSION INTRAVENOUS at 10:12

## 2020-12-07 RX ADMIN — ACETAMINOPHEN 650 MG: 325 TABLET ORAL at 06:12

## 2020-12-07 RX ADMIN — GLYCOPYRROLATE 0.2 MG: 0.2 INJECTION INTRAMUSCULAR; INTRAVENOUS at 07:12

## 2020-12-07 RX ADMIN — SODIUM CHLORIDE, SODIUM GLUCONATE, SODIUM ACETATE, POTASSIUM CHLORIDE, MAGNESIUM CHLORIDE, SODIUM PHOSPHATE, DIBASIC, AND POTASSIUM PHOSPHATE: .53; .5; .37; .037; .03; .012; .00082 INJECTION, SOLUTION INTRAVENOUS at 07:12

## 2020-12-07 RX ADMIN — DONEPEZIL HYDROCHLORIDE 10 MG: 5 TABLET, FILM COATED ORAL at 09:12

## 2020-12-07 RX ADMIN — MIDAZOLAM 2 MG: 1 INJECTION INTRAMUSCULAR; INTRAVENOUS at 07:12

## 2020-12-07 RX ADMIN — SODIUM CHLORIDE 1000 ML: 0.9 INJECTION, SOLUTION INTRAVENOUS at 07:12

## 2020-12-07 RX ADMIN — LIDOCAINE HYDROCHLORIDE 50 MG: 20 INJECTION, SOLUTION EPIDURAL; INFILTRATION; INTRACAUDAL at 07:12

## 2020-12-07 RX ADMIN — ROSUVASTATIN CALCIUM 40 MG: 20 TABLET, FILM COATED ORAL at 09:12

## 2020-12-07 RX ADMIN — HYDROMORPHONE HYDROCHLORIDE 0.2 MG: 1 INJECTION, SOLUTION INTRAMUSCULAR; INTRAVENOUS; SUBCUTANEOUS at 03:12

## 2020-12-07 RX ADMIN — AMLODIPINE BESYLATE 5 MG: 5 TABLET ORAL at 05:12

## 2020-12-07 RX ADMIN — PROPOFOL 180 MG: 10 INJECTION, EMULSION INTRAVENOUS at 07:12

## 2020-12-07 RX ADMIN — ACETAMINOPHEN 650 MG: 325 TABLET ORAL at 12:12

## 2020-12-07 RX ADMIN — FENTANYL CITRATE 25 MCG: 50 INJECTION, SOLUTION INTRAMUSCULAR; INTRAVENOUS at 12:12

## 2020-12-07 RX ADMIN — DOXYCYCLINE HYCLATE 100 MG: 100 TABLET, COATED ORAL at 09:12

## 2020-12-07 RX ADMIN — SUGAMMADEX 400 MG: 100 INJECTION, SOLUTION INTRAVENOUS at 11:12

## 2020-12-07 RX ADMIN — ROCURONIUM BROMIDE 100 MG: 10 INJECTION, SOLUTION INTRAVENOUS at 07:12

## 2020-12-07 RX ADMIN — FENTANYL CITRATE 50 MCG: 50 INJECTION INTRAMUSCULAR; INTRAVENOUS at 10:12

## 2020-12-07 RX ADMIN — GABAPENTIN 900 MG: 300 CAPSULE ORAL at 09:12

## 2020-12-07 NOTE — ANESTHESIA PREPROCEDURE EVALUATION
12/07/2020  Amna Chawla is a 55 y.o., female   Patient Active Problem List   Diagnosis    Complete AV block    Pacemaker    Mixed hyperlipidemia    History of CVA (cerebrovascular accident)    Knee pain    Hiatal hernia    GERD (gastroesophageal reflux disease)    Occipital neuralgia    Chronic migraine without aura, with intractable migraine, so stated, with status migrainosus    Obesity, Class III, BMI 40-49.9 (morbid obesity)    History of sudden cardiac arrest    Biventricular automatic implantable cardioverter defibrillator in situ    Left-sided headache    Essential hypertension    Supraorbital neuralgia    Anxiety    Keratoconjunctivitis sicca of both eyes not specified as Sjogren's    Right carpal tunnel syndrome    Impaired mobility and ADLs    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    Headaches due to old head injury    Decreased ROM of neck    Bilateral carpal tunnel syndrome    Paroxysmal atrial fibrillation    Long term (current) use of anticoagulants    Impaired functional mobility, balance, gait, and endurance    Obstructive sleep apnea    Muscle weakness of lower extremity    Nonischemic cardiomyopathy from RV pacing, resolved with upgrade cardiac resynchronization therapy    History of DVT (deep vein thrombosis)    Depression, major, recurrent, moderate    Insomnia    Left atrial enlargement    History of TIA (transient ischemic attack)    Thyroid cyst    Hemispheric carotid artery syndrome    Thyroid nodule    Anemia due to vitamin B12 deficiency    Vitamin D deficiency    Trigger middle finger of left hand    Prediabetes    B12 deficiency    Acquired trigger finger of right middle finger    Leg edema    Non-compliance    Chronic fatigue    Malfunction of electrode lead of implantable cardioverter-defibrillator (ICD)     Pacemaker lead malfunction    Left-sided chest pain    Acute pain of left knee    Decreased range of motion of left knee    Headache     .    Anesthesia Evaluation          Review of Systems      Physical Exam   Airway/Jaw/Neck:  Airway Findings: Mouth Opening: Normal Tongue: Normal  General Airway Assessment: Adult  Mallampati: II  Improves to II with phonation.  TM Distance: Normal, at least 6 cm      Dental:  No active dental problems.    Chest/Lungs:  Chest/Lungs Findings: Clear to auscultation     Heart/Vascular:  Heart Findings: Rate: Normal  Rhythm: Regular Rhythm  Sounds: Normal        Mental Status:  Mental Status Findings:  Cooperative, Alert and Oriented         Anesthesia Plan  Type of Anesthesia, risks & benefits discussed:  Anesthesia Type:  general  Patient's Preference:   Intra-op Monitoring Plan: standard ASA monitors and arterial line  Intra-op Monitoring Plan Comments:   Post Op Pain Control Plan:   Post Op Pain Control Plan Comments:   Induction:   IV  Beta Blocker:         Informed Consent: Patient understands risks and agrees with Anesthesia plan.  Questions answered. Anesthesia consent signed with patient.  ASA Score: 3     Day of Surgery Review of History & Physical:        Anesthesia Plan Notes: Chart reviewed, patient interviewed and examined.  The plan for anesthesia for this case was discussed.  Questions were answered and the consent was signed.  Rob Schroeder M.D.         Ready For Surgery From Anesthesia Perspective.

## 2020-12-07 NOTE — NURSING
Pt took 3rd dose of the contrast PO prep this morning at 0600.  Pt states she took steroid, tagament, and benadryl for 3 doses as per  instructions.

## 2020-12-07 NOTE — ASSESSMENT & PLAN NOTE
Prior to procedure, we discussed the alternatives, benefits and risks of the procedure including pain, infection, bleeding, injury to lung causing pneumothorax requiring tube placement, injury to heart valves, puncture of the heart leading to pericardial effusion or tamponade requiring tube drainage, heart attack, stroke and death.  Ms. Chawla voices understanding and all questions have been answered.   - Last dose of xarelto on 12/03/20   - Proceed with LV lead extraction and reimplantation     - Anesthesia for sedation

## 2020-12-07 NOTE — CONSULTS
[facility] Stay Cardiac [facility]  Cardiology  Consult Note    Patient Name: Amna Chawla  MRN: [id number]  Admission Date: 12/7/2020  Hospital Length of Stay: 0 days  Code Status: Prior   Attending Provider: Avelino Mejia MD   Consulting Provider: Rajan Herrera MD  Primary Care Physician: Tiffany Mina MD  Principal Problem:Pacemaker lead malfunction    Patient information was obtained from patient and past medical records.     Consults  Subjective:     Chief Complaint:  LV lead malfunction    HPI:   54 y/o F with SCA/VF/AV block s/p ICD with subsequent development of CM (EF 40%) in the setting of normal PET stress and chronic RV pacing. She underwent an upgrade to CRT-D. Diagnosed with LV lead fracture in 8/2020 and presents today for LV lead extraction and reimplantation.     Dysphagia or odynophagia:  No  Liver Disease, esophageal disease, or known varices:  No  Upper GI Bleeding: No  Snoring:  No  Sleep Apnea:  Yes  Prior neck surgery or radiation:  No  History of anesthetic difficulties:  No  Family history of anesthetic difficulties:  No  Last oral intake:  12 hours ago  Able to move neck in all directions:  Yes  Stroke History:  Yes  Last dose of anticoagulation:  12/02/20    Anticoagulation: Eliquis      Cardiac meds - carvedilol 25 mg BID, clopidogrel 75 mg, furosemide 20 mg daily, losartan 100 mg daily, rivaroxaban 20 mg daily    TTE 9/15/20  · Low normal left ventricular systolic function. The estimated ejection fraction is 50%.  · Concentric left ventricular remodeling.· Mild left atrial enlargement.  · Grade I (mild) left ventricular diastolic dysfunction consistent with impaired relaxation.  · Normal right ventricular systolic function.  · Normal central venous pressure (3 mmHg).  · The estimated PA systolic pressure is 33 mmHg.    Past Medical History:   Diagnosis Date    Anticoagulant long-term use     Anxiety     Asthma     Atrial fibrillation     Brain anoxic injury      Cervicalgia 8/28/2014    CHI (closed head injury) 2/19/2013    Convulsion 5/30/2015    Decreased ROM of left shoulder 4/12/2017    Defibrillator activation 2013    Depression     Heart block     History of sudden cardiac arrest 2/2013    PEA arrest with subsequent long-QT    Hx of psychiatric care     Hypertension     Left atrial enlargement 4/11/2018    Pacemaker 2013    Paresthesia 11/1/2013    Prolonged Q-T interval on ECG 2/8/2013    Psychiatric problem     Seizures     Sleep difficulties     Stroke     weakness lt side    Therapy     Thyroid disease     Upper airway resistance syndrome 2/21/2017       Past Surgical History:   Procedure Laterality Date    breast reduction      CARDIAC DEFIBRILLATOR PLACEMENT      CARDIAC DEFIBRILLATOR PLACEMENT      CARPAL TUNNEL RELEASE Right     COLONOSCOPY N/A 5/7/2019    Procedure: COLONOSCOPY;  Surgeon: Juan Coffey MD;  Location: [facility] ([address] [place] [address]);  Service: Endoscopy;  Laterality: N/A;  per DR. Bustamante Pt to have balloon with DR. Coffey due to excesive looping, could not reach cecum - see telephone encounter 3/8/19 and last procedure report dated 6/24/14/ Per Piedad schedule as 90 min case- ERW  pacemaker/AICD Boitronik/   per , ok to hold Xareltox 2 days    HERNIA REPAIR      HIATAL HERNIA REPAIR      INSERT / REPLACE / REMOVE PACEMAKER  10/2017    CRT-D upgrade    TOTAL REDUCTION MAMMOPLASTY      TRIGGER FINGER RELEASE Left 8/2/2019    Procedure: RELEASE, TRIGGER FINGER left middle;  Surgeon: Matt Pugh Jr., MD;  Location: [facility];  Service: Plastics;  Laterality: Left;    TRIGGER FINGER RELEASE Right 2/11/2020    Procedure: RELEASE, TRIGGER FINGER middle;  Surgeon: Matt Pugh Jr., MD;  Location: [facility];  Service: Plastics;  Laterality: Right;    TUBAL LIGATION         Review of patient's allergies indicates:   Allergen Reactions    Aspirin Hives    Imitrex [sumatriptan] Palpitations    Penicillins Hives and  Swelling    Shellfish containing products Anaphylaxis     seafood    Percocet [oxycodone-acetaminophen] Itching     States can take Hydrocodone    Reglan [metoclopramide hcl] Other (See Comments)     Parkinsonism        Current Facility-Administered Medications on File Prior to Encounter   Medication    lactated ringers infusion    lidocaine (PF) 10 mg/ml (1%) injection 5 mg     Current Outpatient Medications on File Prior to Encounter   Medication Sig    blood sugar diagnostic Strp 1 strip by Misc.(Non-Drug; Combo Route) route 2 (two) times daily.    blood-glucose meter kit Please provide with insurance covered meter    carvedilol (COREG) 25 MG tablet TAKE 1 TABLET BY MOUTH TWICE DAILY, WITH MEALS    cimetidine (TAGAMET) 300 MG tablet Take 300 mg 13 hours prior, 7 hours prior, and 1 hour prior to procedure    clonazePAM (KLONOPIN) 1 MG tablet Take 1 tablet (1 mg total) by mouth daily as needed for Anxiety.    clopidogreL (PLAVIX) 75 mg tablet Take 75 mg by mouth once daily.    cyanocobalamin, vitamin B-12, 1,000 mcg Subl Place 2,000 mcg under the tongue once daily.    diphenhydrAMINE (BENADRYL) 25 mg capsule Take 2 capsules (50 mg total) by mouth as needed for Itching (Take 50 mg 13 hours prior, 7 hours prior, and 1 hour prior to procedure).    donepezil (ARICEPT) 10 MG tablet Take 1 tablet (10 mg total) by mouth 2 (two) times daily.    fremanezumab-vfrm (AJOVY) 225 mg/1.5 mL injection Inject 1.5 mLs (225 mg total) into the skin every 28 days.    furosemide (LASIX) 20 MG tablet Take 1 tablet (20 mg total) by mouth once daily.    gabapentin (NEURONTIN) 300 MG capsule Take 3 capsules (900 mg total) by mouth 3 (three) times daily.    losartan (COZAAR) 100 MG tablet Take 1 tablet (100 mg total) by mouth once daily.    magnesium 200 mg Tab Take 200 mg by mouth once daily. (Patient taking differently: Take 200 mg by mouth every other day. )    ondansetron (ZOFRAN-ODT) 4 MG TbDL Take 1 tablet (4 mg  total) by mouth every 12 (twelve) hours as needed (nausea).    pantoprazole (PROTONIX) 40 MG tablet Take 1 tablet (40 mg total) by mouth once daily.    rosuvastatin (CRESTOR) 40 MG Tab Take 1 tablet (40 mg total) by mouth every evening.    tiaGABine (GABITRIL) 4 MG tablet Take 1 tablet (4 mg total) by mouth every evening.    TRUE METRIX GLUCOSE METER Misc TEST BG BID    ubrogepant (UBRELVY)tablet 100 mg Take 1 tablet (100 mg total) by mouth 2 (two) times daily as needed for Migraine.    ergocalciferol (ERGOCALCIFEROL) 50,000 unit Cap Take 1 capsule (50,000 Units total) by mouth twice a week.    flurbiprofen (ANSAID) 100 MG tablet Take 1 tablet (100 mg total) by mouth 2 (two) times daily as needed (migraine).    lancets Misc 1 Device by Misc.(Non-Drug; Combo Route) route 2 (two) times daily.    lidocaine (LIDODERM) 5 % Place 1 patch onto the skin once daily. Remove & Discard patch within 12 hours or as directed by MD    metFORMIN (GLUCOPHAGE) 1000 MG tablet Take 1 tablet (1,000 mg total) by mouth 2 (two) times daily with meals. for 7 days    mupirocin (BACTROBAN) 2 % ointment Apply to affected area 3 times daily    silver sulfADIAZINE 1% (SILVADENE) 1 % cream Apply topically 2 (two) times daily.    XARELTO 20 mg Tab TAKE 1 TABLET BY MOUTH DAILY WITH DINNER OR EVENING MEAL     Family History     Problem Relation (Age of Onset)    COPD     Glaucoma Maternal Grandmother, Paternal Grandmother    Heart failure     Hypertension Mother        Tobacco Use    Smoking status: Never Smoker    Smokeless tobacco: Never Used   Substance and Sexual Activity    Alcohol use: No     Frequency: 2-4 times a month     Drinks per session: 1 or 2     Binge frequency: Never    Drug use: No    Sexual activity: Not Currently     Review of Systems   Constitution: Negative for chills and fever.   Cardiovascular: Negative for chest pain, dyspnea on exertion, palpitations and syncope.   Respiratory: Negative for cough and sleep  disturbances due to breathing.    Musculoskeletal: Negative for back pain.   Gastrointestinal: Negative for abdominal pain, nausea and vomiting.   Neurological: Negative for dizziness and focal weakness.   Psychiatric/Behavioral: Negative for altered mental status.     Objective:     Vital Signs (Most Recent):  Temp: 98.5 °F (36.9 °C) (12/07/20 0606)  Pulse: 88 (12/07/20 0606)  Resp: 18 (12/07/20 0606)  BP: (!) 166/79 (12/07/20 0608)  SpO2: 97 % (12/07/20 0606) Vital Signs (24h Range):  Temp:  [98.5 °F (36.9 °C)] 98.5 °F (36.9 °C)  Pulse:  [88] 88  Resp:  [18] 18  SpO2:  [97 %] 97 %  BP: (166-176)/(79-94) 166/79     Weight: 129.7 kg (286 lb)  Body mass index is 49.09 kg/m².    SpO2: 97 %       No intake or output data in the 24 hours ending 12/07/20 0653    Lines/Drains/Airways     Peripheral Intravenous Line                 Peripheral IV - Single Lumen 12/07/20 0628 20 G Right Hand less than 1 day         Peripheral IV - Single Lumen 12/07/20 0644 18 G Left Hand less than 1 day                Physical Exam   Constitutional: She is oriented to person, place, and time. She appears well-developed and well-nourished. No distress.   HENT:   Head: Normocephalic and atraumatic.   Eyes: Conjunctivae and EOM are normal.   Neck: Normal range of motion. No tracheal deviation present.   Cardiovascular: Normal rate, regular rhythm and normal heart sounds.   No murmur heard.  Pulmonary/Chest: Effort normal and breath sounds normal. No respiratory distress. She has no wheezes.   Abdominal: Soft. Bowel sounds are normal. She exhibits no distension. There is no abdominal tenderness.   Musculoskeletal: Normal range of motion.         General: No edema.   Neurological: She is alert and oriented to person, place, and time.   Skin: She is not diaphoretic.       Significant Labs: All pertinent lab results from the last 24 hours have been reviewed.    Significant Imaging: All pertinent images from the last 24h have been  reviewed.      Assessment and Plan:     * Pacemaker lead malfunction  1. FRITZ for assistance during LV lead extraction  -No absolute contraindications of esophageal stricture, tumor, perforation, laceration,or diverticulum and/or active GI bleed  -The risks, benefits & alternatives of the procedure were explained to the patient.   -The risks of transesophageal echo include but are not limited to:  Dental trauma, esophageal trauma/perforation, bleeding, laryngospasm/brochospasm, aspiration, sore throat/hoarseness, & dislodgement of the endotracheal tube/nasogastric tube (where applicable).    -The risks of moderate sedation include hypotension, respiratory depression, arrhythmias, bronchospasm, & death.    -Informed consent was obtained. The patient is agreeable to proceed with the procedure and all questions and concerns addressed.    Case discussed with an attending in echocardiography lab.     Further recommendations per attending addendum        VTE Risk Mitigation (From admission, onward)    None            Rajan Herrera MD  Cardiology   [facility]

## 2020-12-07 NOTE — PLAN OF CARE
"Vss. sats 99% on 2l nc. Pt aaox4 follows commands. Pt arrived with left chest incision walt, well approx.  dermabond dried, aquacell ag placed, guaze/pressure drsg, surgical bra and sling on. Pt complaints of "pain to chest wall incision left" prn po and iv pain meds given per md order. Pt tolerating water in ep pacu. Arrived with conner in place,secured with castañeda. Clear yellow urine noted. Bedrest x3hrs.  Pt has biotronik device. ddd low limit 60.  Pt v paced on cm.  12 lead ekg done and in chart. And chest xray done per md order. At this time "tolerable" on pain scale. Pt's daughter re updated over phone by ep pacu rn. Verbalizes understanding. Awaiting csu bed, none available at this time, remains in ep pacu 4. See flowsheet for full assessment. mile hill np updated pt in ep pacu, verbalizes understanding. Will continue to update as needed.   "

## 2020-12-07 NOTE — ANESTHESIA POSTPROCEDURE EVALUATION
Anesthesia Post Evaluation    Patient: Amna Chawla    Procedure(s) Performed: Procedure(s) (LRB):  EXTRACTION, ELECTRODE LEAD (Left)  REVISION, INSERTION, ELECTRODE LEAD, ICD (Left)  ECHOCARDIOGRAM, TRANSESOPHAGEAL (N/A)  Revision, Skin Pocket, For Cardioverter-Defibrillator    Final Anesthesia Type: general    Patient location during evaluation: PACU  Patient participation: Yes- Able to Participate  Level of consciousness: awake and alert  Post-procedure vital signs: reviewed and stable  Pain management: adequate  Airway patency: patent    PONV status at discharge: No PONV  Anesthetic complications: no      Cardiovascular status: hemodynamically stable  Respiratory status: unassisted  Hydration status: euvolemic  Follow-up not needed.          Vitals Value Taken Time   /102 12/07/20 1516   Temp 37 °C (98.6 °F) 12/07/20 1500   Pulse 67 12/07/20 1516   Resp 85 12/07/20 1516   SpO2 96 % 12/07/20 1516   Vitals shown include unvalidated device data.      No case tracking events are documented in the log.      Pain/Kofi Score: Pain Rating Prior to Med Admin: 7 (12/7/2020  1:39 PM)  Pain Rating Post Med Admin: 5 (12/7/2020  2:00 PM)  Kofi Score: 9 (12/7/2020  2:15 PM)

## 2020-12-07 NOTE — TRANSFER OF CARE
"Anesthesia Transfer of Care Note    Patient: Amna Chawla    Procedure(s) Performed: Procedure(s) (LRB):  EXTRACTION, ELECTRODE LEAD (Left)  REVISION, INSERTION, ELECTRODE LEAD, ICD (Left)  ECHOCARDIOGRAM, TRANSESOPHAGEAL (N/A)  Revision, Skin Pocket, For Cardioverter-Defibrillator    Patient location: PACU    Anesthesia Type: general    Transport from OR: Transported from OR on 6-10 L/min O2 by face mask with adequate spontaneous ventilation    Post pain: adequate analgesia    Post assessment: no apparent anesthetic complications and tolerated procedure well    Post vital signs: stable    Level of consciousness: awake, alert and oriented    Nausea/Vomiting: no nausea/vomiting    Complications: none    Transfer of care protocol was followed      Last vitals:   Visit Vitals  BP (!) 166/79 (BP Location: Left arm, Patient Position: Lying)   Pulse 88   Temp (P) 36.5 °C (97.7 °F) (Temporal)   Resp (P) 16   Ht 5' 4" (1.626 m)   Wt 129.7 kg (286 lb)   LMP 02/04/2016 (Approximate)   SpO2 97%   BMI 49.09 kg/m²     "

## 2020-12-07 NOTE — PROGRESS NOTES
"mile hill np at bedside. Aware of pt's bp 180-205.  Pt did take all bp meds this am.  No new orders given at this time.  Pt given iv pain meds prn per md order. Pt with slight "discomfort" to left chest incision. Will continue to monitor.   "

## 2020-12-07 NOTE — PROGRESS NOTES
Pt arrived with right radial rupali, not transducing.  Removed by ep pacu rn. Manual pressure held.  No hematoma or drainage noted. Sabina/coban drsg in place. Will continue to monitor.

## 2020-12-07 NOTE — Clinical Note
A venogram was performed in the coronary sinus. A balloon was inserted. The vessel was injected with hand injection with 4 mL of contrast. The balloon was removed.

## 2020-12-07 NOTE — Clinical Note
A venogram was performed in the left axillary vein. The vessel was injected with hand injection with 10 mL of contrast. Per CRNA

## 2020-12-07 NOTE — NURSING
Report from Stella Duke.  Awake and alert.  No c/o pain or SOB.  Sling in place to left arm.  Dressing to left upper chest wall clean dry and intact.  oiriented to room and call bell provided.  [place] and drinks provided.  Will continue to monitor.

## 2020-12-07 NOTE — HPI
Amna Chawla presents to short stay cardiac unit on 12/07/2020 for planned LV lead extraction and reimplantation with Dr. Mejia.     Ms. Chawla is a 55 y.o. female cardiac arrest survivor with VF noted during arrest, no ischemic heart disease with preserved LVEF, intermittent 3rd degree AV block, and symptomatic LVEF of 40% in setting of normal PET stress and chronic RV pacing.  EF has since normalized. Her LV lead is now fractured and she has an occluded left subclavian vein.   At follow up with Dr. Mejia, discussed lead revision. Plan to attempt to wire the LV lead with a long coronary angiogram wire and retain access with it and back out the LV lead with manual traction then attempt to upgrade access over the retained wire. If unable to do that, then plan for a complete extraction-reimplantation.     Today, Ms. Chawla reports feeling well. She denies any cardiac symptoms. Labs from today pending. Continues to be treated with xarelto of pAF.  Holding since 12/03/20.     EKG:   My independent visualization of most recent EKG is ASVP rhythm at 85 bpm

## 2020-12-07 NOTE — SUBJECTIVE & OBJECTIVE
Past Medical History:   Diagnosis Date    Anticoagulant long-term use     Anxiety     Asthma     Atrial fibrillation     Brain anoxic injury     Cervicalgia 8/28/2014    CHI (closed head injury) 2/19/2013    Convulsion 5/30/2015    Decreased ROM of left shoulder 4/12/2017    Defibrillator activation 2013    Depression     Heart block     History of sudden cardiac arrest 2/2013    PEA arrest with subsequent long-QT    Hx of psychiatric care     Hypertension     Left atrial enlargement 4/11/2018    Pacemaker 2013    Paresthesia 11/1/2013    Prolonged Q-T interval on ECG 2/8/2013    Psychiatric problem     Seizures     Sleep difficulties     Stroke     weakness lt side    Therapy     Thyroid disease     Upper airway resistance syndrome 2/21/2017       Past Surgical History:   Procedure Laterality Date    breast reduction      CARDIAC DEFIBRILLATOR PLACEMENT      CARDIAC DEFIBRILLATOR PLACEMENT      CARPAL TUNNEL RELEASE Right     COLONOSCOPY N/A 5/7/2019    Procedure: COLONOSCOPY;  Surgeon: Juan Coffey MD;  Location: [facility] ENDO ([address] [place] [address]);  Service: Endoscopy;  Laterality: N/A;  per DR. Bustamante Pt to have balloon with DR. Coffey due to excesive looping, could not reach cecum - see telephone encounter 3/8/19 and last procedure report dated 6/24/14/ Per Piedad schedule as 90 min case- ERW  pacemaker/AICD Boitronik/   per , ok to hold Xareltox 2 days    HERNIA REPAIR      HIATAL HERNIA REPAIR      INSERT / REPLACE / REMOVE PACEMAKER  10/2017    CRT-D upgrade    TOTAL REDUCTION MAMMOPLASTY      TRIGGER FINGER RELEASE Left 8/2/2019    Procedure: RELEASE, TRIGGER FINGER left middle;  Surgeon: Matt Pugh Jr., MD;  Location: [facility];  Service: Plastics;  Laterality: Left;    TRIGGER FINGER RELEASE Right 2/11/2020    Procedure: RELEASE, TRIGGER FINGER middle;  Surgeon: Matt Pugh Jr., MD;  Location: [facility];  Service: Plastics;  Laterality: Right;    TUBAL LIGATION          Review of patient's allergies indicates:   Allergen Reactions    Aspirin Hives    Imitrex [sumatriptan] Palpitations    Penicillins Hives and Swelling    Shellfish containing products Anaphylaxis     seafood    Percocet [oxycodone-acetaminophen] Itching     States can take Hydrocodone    Reglan [metoclopramide hcl] Other (See Comments)     Parkinsonism        Current Facility-Administered Medications on File Prior to Encounter   Medication    lactated ringers infusion    lidocaine (PF) 10 mg/ml (1%) injection 5 mg     Current Outpatient Medications on File Prior to Encounter   Medication Sig    blood sugar diagnostic Strp 1 strip by Misc.(Non-Drug; Combo Route) route 2 (two) times daily.    blood-glucose meter kit Please provide with insurance covered meter    carvedilol (COREG) 25 MG tablet TAKE 1 TABLET BY MOUTH TWICE DAILY, WITH MEALS    cimetidine (TAGAMET) 300 MG tablet Take 300 mg 13 hours prior, 7 hours prior, and 1 hour prior to procedure    clonazePAM (KLONOPIN) 1 MG tablet Take 1 tablet (1 mg total) by mouth daily as needed for Anxiety.    clopidogreL (PLAVIX) 75 mg tablet Take 75 mg by mouth once daily.    cyanocobalamin, vitamin B-12, 1,000 mcg Subl Place 2,000 mcg under the tongue once daily.    diphenhydrAMINE (BENADRYL) 25 mg capsule Take 2 capsules (50 mg total) by mouth as needed for Itching (Take 50 mg 13 hours prior, 7 hours prior, and 1 hour prior to procedure).    donepezil (ARICEPT) 10 MG tablet Take 1 tablet (10 mg total) by mouth 2 (two) times daily.    fremanezumab-vfrm (AJOVY) 225 mg/1.5 mL injection Inject 1.5 mLs (225 mg total) into the skin every 28 days.    furosemide (LASIX) 20 MG tablet Take 1 tablet (20 mg total) by mouth once daily.    gabapentin (NEURONTIN) 300 MG capsule Take 3 capsules (900 mg total) by mouth 3 (three) times daily.    losartan (COZAAR) 100 MG tablet Take 1 tablet (100 mg total) by mouth once daily.    magnesium 200 mg Tab Take 200 mg  by mouth once daily. (Patient taking differently: Take 200 mg by mouth every other day. )    ondansetron (ZOFRAN-ODT) 4 MG TbDL Take 1 tablet (4 mg total) by mouth every 12 (twelve) hours as needed (nausea).    pantoprazole (PROTONIX) 40 MG tablet Take 1 tablet (40 mg total) by mouth once daily.    rosuvastatin (CRESTOR) 40 MG Tab Take 1 tablet (40 mg total) by mouth every evening.    tiaGABine (GABITRIL) 4 MG tablet Take 1 tablet (4 mg total) by mouth every evening.    TRUE METRIX GLUCOSE METER Misc TEST BG BID    ubrogepant (UBRELVY)tablet 100 mg Take 1 tablet (100 mg total) by mouth 2 (two) times daily as needed for Migraine.    ergocalciferol (ERGOCALCIFEROL) 50,000 unit Cap Take 1 capsule (50,000 Units total) by mouth twice a week.    flurbiprofen (ANSAID) 100 MG tablet Take 1 tablet (100 mg total) by mouth 2 (two) times daily as needed (migraine).    lancets Misc 1 Device by Misc.(Non-Drug; Combo Route) route 2 (two) times daily.    lidocaine (LIDODERM) 5 % Place 1 patch onto the skin once daily. Remove & Discard patch within 12 hours or as directed by MD    metFORMIN (GLUCOPHAGE) 1000 MG tablet Take 1 tablet (1,000 mg total) by mouth 2 (two) times daily with meals. for 7 days    mupirocin (BACTROBAN) 2 % ointment Apply to affected area 3 times daily    silver sulfADIAZINE 1% (SILVADENE) 1 % cream Apply topically 2 (two) times daily.    XARELTO 20 mg Tab TAKE 1 TABLET BY MOUTH DAILY WITH DINNER OR EVENING MEAL     Family History     Problem Relation (Age of Onset)    COPD     Glaucoma Maternal Grandmother, Paternal Grandmother    Heart failure     Hypertension Mother        Tobacco Use    Smoking status: Never Smoker    Smokeless tobacco: Never Used   Substance and Sexual Activity    Alcohol use: No     Frequency: 2-4 times a month     Drinks per session: 1 or 2     Binge frequency: Never    Drug use: No    Sexual activity: Not Currently     Review of Systems   Constitution: Negative for  chills and fever.   Cardiovascular: Negative for chest pain, dyspnea on exertion, palpitations and syncope.   Respiratory: Negative for cough and sleep disturbances due to breathing.    Musculoskeletal: Negative for back pain.   Gastrointestinal: Negative for abdominal pain, nausea and vomiting.   Neurological: Negative for dizziness and focal weakness.   Psychiatric/Behavioral: Negative for altered mental status.     Objective:     Vital Signs (Most Recent):  Temp: 98.5 °F (36.9 °C) (12/07/20 0606)  Pulse: 88 (12/07/20 0606)  Resp: 18 (12/07/20 0606)  BP: (!) 166/79 (12/07/20 0608)  SpO2: 97 % (12/07/20 0606) Vital Signs (24h Range):  Temp:  [98.5 °F (36.9 °C)] 98.5 °F (36.9 °C)  Pulse:  [88] 88  Resp:  [18] 18  SpO2:  [97 %] 97 %  BP: (166-176)/(79-94) 166/79     Weight: 129.7 kg (286 lb)  Body mass index is 49.09 kg/m².    SpO2: 97 %       No intake or output data in the 24 hours ending 12/07/20 0653    Lines/Drains/Airways     Peripheral Intravenous Line                 Peripheral IV - Single Lumen 12/07/20 0628 20 G Right Hand less than 1 day         Peripheral IV - Single Lumen 12/07/20 0644 18 G Left Hand less than 1 day                Physical Exam   Constitutional: She is oriented to person, place, and time. She appears well-developed and well-nourished. No distress.   HENT:   Head: Normocephalic and atraumatic.   Eyes: Conjunctivae and EOM are normal.   Neck: Normal range of motion. No tracheal deviation present.   Cardiovascular: Normal rate, regular rhythm and normal heart sounds.   No murmur heard.  Pulmonary/Chest: Effort normal and breath sounds normal. No respiratory distress. She has no wheezes.   Abdominal: Soft. Bowel sounds are normal. She exhibits no distension. There is no abdominal tenderness.   Musculoskeletal: Normal range of motion.         General: No edema.   Neurological: She is alert and oriented to person, place, and time.   Skin: She is not diaphoretic.       Significant Labs: All  pertinent lab results from the last 24 hours have been reviewed.    Significant Imaging: All pertinent images from the last 24h have been reviewed.

## 2020-12-07 NOTE — Clinical Note
Pre-existing MTM [employer]ron[employer] LV lead MN: [address] L 85 SN: [id number] DOI: 10/13/2017 was explanted due to lead fracture and was returned to .

## 2020-12-07 NOTE — Clinical Note
Lead was thresholds tested. A new lead was attached to the following location: coronary sinus.

## 2020-12-07 NOTE — PLAN OF CARE
Pt ambulated on unit this morning with daughter at her side.  Denies pain or SOB.  Verbalized understanding of procedure.  Oriented to unit and call bell provided.  CBG = 138 this morning.  Will continue to monitor.

## 2020-12-07 NOTE — SUBJECTIVE & OBJECTIVE
Past Medical History:   Diagnosis Date    Anticoagulant long-term use     Anxiety     Asthma     Atrial fibrillation     Brain anoxic injury     Cervicalgia 8/28/2014    CHI (closed head injury) 2/19/2013    Convulsion 5/30/2015    Decreased ROM of left shoulder 4/12/2017    Defibrillator activation 2013    Depression     Heart block     History of sudden cardiac arrest 2/2013    PEA arrest with subsequent long-QT    Hx of psychiatric care     Hypertension     Left atrial enlargement 4/11/2018    Pacemaker 2013    Paresthesia 11/1/2013    Prolonged Q-T interval on ECG 2/8/2013    Psychiatric problem     Seizures     Sleep difficulties     Stroke     weakness lt side    Therapy     Thyroid disease     Upper airway resistance syndrome 2/21/2017       Past Surgical History:   Procedure Laterality Date    breast reduction      CARDIAC DEFIBRILLATOR PLACEMENT      CARDIAC DEFIBRILLATOR PLACEMENT      CARPAL TUNNEL RELEASE Right     COLONOSCOPY N/A 5/7/2019    Procedure: COLONOSCOPY;  Surgeon: Juan Coffey MD;  Location: [facility] ENDO ([address] [place] [address]);  Service: Endoscopy;  Laterality: N/A;  per DR. Bustamante Pt to have balloon with DR. Coffey due to excesive looping, could not reach cecum - see telephone encounter 3/8/19 and last procedure report dated 6/24/14/ Per Piedad schedule as 90 min case- ERW  pacemaker/AICD Boitronik/   per , ok to hold Xareltox 2 days    HERNIA REPAIR      HIATAL HERNIA REPAIR      INSERT / REPLACE / REMOVE PACEMAKER  10/2017    CRT-D upgrade    TOTAL REDUCTION MAMMOPLASTY      TRIGGER FINGER RELEASE Left 8/2/2019    Procedure: RELEASE, TRIGGER FINGER left middle;  Surgeon: Matt Pugh Jr., MD;  Location: [facility];  Service: Plastics;  Laterality: Left;    TRIGGER FINGER RELEASE Right 2/11/2020    Procedure: RELEASE, TRIGGER FINGER middle;  Surgeon: Matt Pugh Jr., MD;  Location: [facility];  Service: Plastics;  Laterality: Right;    TUBAL LIGATION          Review of patient's allergies indicates:   Allergen Reactions    Aspirin Hives    Imitrex [sumatriptan] Palpitations    Penicillins Hives and Swelling    Shellfish containing products Anaphylaxis     seafood    Percocet [oxycodone-acetaminophen] Itching     States can take Hydrocodone    Reglan [metoclopramide hcl] Other (See Comments)     Parkinsonism        Current Facility-Administered Medications on File Prior to Encounter   Medication    lactated ringers infusion    lidocaine (PF) 10 mg/ml (1%) injection 5 mg     Current Outpatient Medications on File Prior to Encounter   Medication Sig    blood sugar diagnostic Strp 1 strip by Misc.(Non-Drug; Combo Route) route 2 (two) times daily.    blood-glucose meter kit Please provide with insurance covered meter    carvedilol (COREG) 25 MG tablet TAKE 1 TABLET BY MOUTH TWICE DAILY, WITH MEALS    cimetidine (TAGAMET) 300 MG tablet Take 300 mg 13 hours prior, 7 hours prior, and 1 hour prior to procedure    clonazePAM (KLONOPIN) 1 MG tablet Take 1 tablet (1 mg total) by mouth daily as needed for Anxiety.    clopidogreL (PLAVIX) 75 mg tablet Take 75 mg by mouth once daily.    cyanocobalamin, vitamin B-12, 1,000 mcg Subl Place 2,000 mcg under the tongue once daily.    diphenhydrAMINE (BENADRYL) 25 mg capsule Take 2 capsules (50 mg total) by mouth as needed for Itching (Take 50 mg 13 hours prior, 7 hours prior, and 1 hour prior to procedure).    donepezil (ARICEPT) 10 MG tablet Take 1 tablet (10 mg total) by mouth 2 (two) times daily.    fremanezumab-vfrm (AJOVY) 225 mg/1.5 mL injection Inject 1.5 mLs (225 mg total) into the skin every 28 days.    furosemide (LASIX) 20 MG tablet Take 1 tablet (20 mg total) by mouth once daily.    gabapentin (NEURONTIN) 300 MG capsule Take 3 capsules (900 mg total) by mouth 3 (three) times daily.    losartan (COZAAR) 100 MG tablet Take 1 tablet (100 mg total) by mouth once daily.    magnesium 200 mg Tab Take 200 mg  by mouth once daily. (Patient taking differently: Take 200 mg by mouth every other day. )    ondansetron (ZOFRAN-ODT) 4 MG TbDL Take 1 tablet (4 mg total) by mouth every 12 (twelve) hours as needed (nausea).    pantoprazole (PROTONIX) 40 MG tablet Take 1 tablet (40 mg total) by mouth once daily.    rosuvastatin (CRESTOR) 40 MG Tab Take 1 tablet (40 mg total) by mouth every evening.    tiaGABine (GABITRIL) 4 MG tablet Take 1 tablet (4 mg total) by mouth every evening.    TRUE METRIX GLUCOSE METER Misc TEST BG BID    ubrogepant (UBRELVY)tablet 100 mg Take 1 tablet (100 mg total) by mouth 2 (two) times daily as needed for Migraine.    ergocalciferol (ERGOCALCIFEROL) 50,000 unit Cap Take 1 capsule (50,000 Units total) by mouth twice a week.    flurbiprofen (ANSAID) 100 MG tablet Take 1 tablet (100 mg total) by mouth 2 (two) times daily as needed (migraine).    lancets Misc 1 Device by Misc.(Non-Drug; Combo Route) route 2 (two) times daily.    lidocaine (LIDODERM) 5 % Place 1 patch onto the skin once daily. Remove & Discard patch within 12 hours or as directed by MD    metFORMIN (GLUCOPHAGE) 1000 MG tablet Take 1 tablet (1,000 mg total) by mouth 2 (two) times daily with meals. for 7 days    mupirocin (BACTROBAN) 2 % ointment Apply to affected area 3 times daily    silver sulfADIAZINE 1% (SILVADENE) 1 % cream Apply topically 2 (two) times daily.    XARELTO 20 mg Tab TAKE 1 TABLET BY MOUTH DAILY WITH DINNER OR EVENING MEAL     Family History     Problem Relation (Age of Onset)    COPD     Glaucoma Maternal Grandmother, Paternal Grandmother    Heart failure     Hypertension Mother        Tobacco Use    Smoking status: Never Smoker    Smokeless tobacco: Never Used   Substance and Sexual Activity    Alcohol use: No     Frequency: 2-4 times a month     Drinks per session: 1 or 2     Binge frequency: Never    Drug use: No    Sexual activity: Not Currently     Review of Systems   Constitution: Negative for  fever and malaise/fatigue.   HENT: Negative for hearing loss.    Cardiovascular: Negative for chest pain, dyspnea on exertion, irregular heartbeat, leg swelling, palpitations and syncope.   Hematologic/Lymphatic: Negative for bleeding problem.   Gastrointestinal: Negative for heartburn.   Neurological: Negative for light-headedness.   Psychiatric/Behavioral: Negative for altered mental status. The patient is not nervous/anxious.      Objective:     Vital Signs (Most Recent):  Temp: 98.5 °F (36.9 °C) (12/07/20 0606)  Pulse: 88 (12/07/20 0606)  Resp: 18 (12/07/20 0606)  BP: (!) 166/79 (12/07/20 0608)  SpO2: 97 % (12/07/20 0606) Vital Signs (24h Range):  Temp:  [98.5 °F (36.9 °C)] 98.5 °F (36.9 °C)  Pulse:  [88] 88  Resp:  [18] 18  SpO2:  [97 %] 97 %  BP: (166-176)/(79-94) 166/79       Weight: 129.7 kg (286 lb)  Body mass index is 49.09 kg/m².    SpO2: 97 %       Physical Exam   Constitutional: She is oriented to person, place, and time. Vital signs are normal. She appears well-developed and well-nourished.   Cardiovascular: Normal rate, regular rhythm, S1 normal, S2 normal, normal heart sounds and intact distal pulses.   Pulmonary/Chest: Effort normal and breath sounds normal.   Musculoskeletal: Normal range of motion.         General: No edema.   Neurological: She is alert and oriented to person, place, and time. She has normal strength.   Skin: Skin is warm, dry and intact.   Vitals reviewed.

## 2020-12-07 NOTE — H&P
[facility] - Short Stay Cardiac Unit  Cardiac Electrophysiology  History and Physical     Admission Date: 12/7/2020  Code Status: Prior   Attending Provider: Avelino Mejia MD   Principal Problem:<principal problem not specified>    Subjective:     Chief Complaint:  LV lead malfunction      HPI:  Amna Chawla presents to short stay cardiac unit on 12/07/2020 for planned LV lead extraction and reimplantation with Dr. Mejia.     Ms. Chawla is a 55 y.o. female cardiac arrest survivor with VF noted during arrest, no ischemic heart disease with preserved LVEF, intermittent 3rd degree AV block, and symptomatic LVEF of 40% in setting of normal PET stress and chronic RV pacing.  EF has since normalized. Her LV lead is now fractured and she has an occluded left subclavian vein.   At follow up with Dr. Mejia, discussed lead revision. Plan to attempt to wire the LV lead with a long coronary angiogram wire and retain access with it and back out the LV lead with manual traction then attempt to upgrade access over the retained wire. If unable to do that, then plan for a complete extraction-reimplantation.     Today, Ms. Chawla reports feeling well. She denies any cardiac symptoms. Labs from today pending. Continues to be treated with xarelto of pAF.  Holding since 12/03/20.     EKG:   My independent visualization of most recent EKG is ASVP rhythm at 85 bpm    Past Medical History:   Diagnosis Date    Anticoagulant long-term use     Anxiety     Asthma     Atrial fibrillation     Brain anoxic injury     Cervicalgia 8/28/2014    CHI (closed head injury) 2/19/2013    Convulsion 5/30/2015    Decreased ROM of left shoulder 4/12/2017    Defibrillator activation 2013    Depression     Heart block     History of sudden cardiac arrest 2/2013    PEA arrest with subsequent long-QT    Hx of psychiatric care     Hypertension     Left atrial enlargement 4/11/2018    Pacemaker 2013    Paresthesia 11/1/2013     Prolonged Q-T interval on ECG 2/8/2013    Psychiatric problem     Seizures     Sleep difficulties     Stroke     weakness lt side    Therapy     Thyroid disease     Upper airway resistance syndrome 2/21/2017       Past Surgical History:   Procedure Laterality Date    breast reduction      CARDIAC DEFIBRILLATOR PLACEMENT      CARDIAC DEFIBRILLATOR PLACEMENT      CARPAL TUNNEL RELEASE Right     COLONOSCOPY N/A 5/7/2019    Procedure: COLONOSCOPY;  Surgeon: Juan Coffey MD;  Location: [facility] ([address] [place] [address]);  Service: Endoscopy;  Laterality: N/A;  per DR. Bustamante Pt to have balloon with DR. Coffey due to excesive looping, could not reach cecum - see telephone encounter 3/8/19 and last procedure report dated 6/24/14/ Per Piedad schedule as 90 min case- ERW  pacemaker/AICD Boitronik/   per , ok to hold Xareltox 2 days    HERNIA REPAIR      HIATAL HERNIA REPAIR      INSERT / REPLACE / REMOVE PACEMAKER  10/2017    CRT-D upgrade    TOTAL REDUCTION MAMMOPLASTY      TRIGGER FINGER RELEASE Left 8/2/2019    Procedure: RELEASE, TRIGGER FINGER left middle;  Surgeon: Matt Pugh Jr., MD;  Location: [facility];  Service: Plastics;  Laterality: Left;    TRIGGER FINGER RELEASE Right 2/11/2020    Procedure: RELEASE, TRIGGER FINGER middle;  Surgeon: Matt Pugh Jr., MD;  Location: [facility];  Service: Plastics;  Laterality: Right;    TUBAL LIGATION         Review of patient's allergies indicates:   Allergen Reactions    Aspirin Hives    Imitrex [sumatriptan] Palpitations    Penicillins Hives and Swelling    Shellfish containing products Anaphylaxis     seafood    Percocet [oxycodone-acetaminophen] Itching     States can take Hydrocodone    Reglan [metoclopramide hcl] Other (See Comments)     Parkinsonism        Current Facility-Administered Medications on File Prior to Encounter   Medication    lactated ringers infusion    lidocaine (PF) 10 mg/ml (1%) injection 5 mg     Current Outpatient  Medications on File Prior to Encounter   Medication Sig    blood sugar diagnostic Strp 1 strip by Misc.(Non-Drug; Combo Route) route 2 (two) times daily.    blood-glucose meter kit Please provide with insurance covered meter    carvedilol (COREG) 25 MG tablet TAKE 1 TABLET BY MOUTH TWICE DAILY, WITH MEALS    cimetidine (TAGAMET) 300 MG tablet Take 300 mg 13 hours prior, 7 hours prior, and 1 hour prior to procedure    clonazePAM (KLONOPIN) 1 MG tablet Take 1 tablet (1 mg total) by mouth daily as needed for Anxiety.    clopidogreL (PLAVIX) 75 mg tablet Take 75 mg by mouth once daily.    cyanocobalamin, vitamin B-12, 1,000 mcg Subl Place 2,000 mcg under the tongue once daily.    diphenhydrAMINE (BENADRYL) 25 mg capsule Take 2 capsules (50 mg total) by mouth as needed for Itching (Take 50 mg 13 hours prior, 7 hours prior, and 1 hour prior to procedure).    donepezil (ARICEPT) 10 MG tablet Take 1 tablet (10 mg total) by mouth 2 (two) times daily.    fremanezumab-vfrm (AJOVY) 225 mg/1.5 mL injection Inject 1.5 mLs (225 mg total) into the skin every 28 days.    furosemide (LASIX) 20 MG tablet Take 1 tablet (20 mg total) by mouth once daily.    gabapentin (NEURONTIN) 300 MG capsule Take 3 capsules (900 mg total) by mouth 3 (three) times daily.    losartan (COZAAR) 100 MG tablet Take 1 tablet (100 mg total) by mouth once daily.    magnesium 200 mg Tab Take 200 mg by mouth once daily. (Patient taking differently: Take 200 mg by mouth every other day. )    ondansetron (ZOFRAN-ODT) 4 MG TbDL Take 1 tablet (4 mg total) by mouth every 12 (twelve) hours as needed (nausea).    pantoprazole (PROTONIX) 40 MG tablet Take 1 tablet (40 mg total) by mouth once daily.    rosuvastatin (CRESTOR) 40 MG Tab Take 1 tablet (40 mg total) by mouth every evening.    tiaGABine (GABITRIL) 4 MG tablet Take 1 tablet (4 mg total) by mouth every evening.    TRUE METRIX GLUCOSE METER Misc TEST BG BID    ubrogepant (UBRELVY)tablet 100  mg Take 1 tablet (100 mg total) by mouth 2 (two) times daily as needed for Migraine.    ergocalciferol (ERGOCALCIFEROL) 50,000 unit Cap Take 1 capsule (50,000 Units total) by mouth twice a week.    flurbiprofen (ANSAID) 100 MG tablet Take 1 tablet (100 mg total) by mouth 2 (two) times daily as needed (migraine).    lancets Misc 1 Device by Misc.(Non-Drug; Combo Route) route 2 (two) times daily.    lidocaine (LIDODERM) 5 % Place 1 patch onto the skin once daily. Remove & Discard patch within 12 hours or as directed by MD    metFORMIN (GLUCOPHAGE) 1000 MG tablet Take 1 tablet (1,000 mg total) by mouth 2 (two) times daily with meals. for 7 days    mupirocin (BACTROBAN) 2 % ointment Apply to affected area 3 times daily    silver sulfADIAZINE 1% (SILVADENE) 1 % cream Apply topically 2 (two) times daily.    XARELTO 20 mg Tab TAKE 1 TABLET BY MOUTH DAILY WITH DINNER OR EVENING MEAL     Family History     Problem Relation (Age of Onset)    COPD     Glaucoma Maternal Grandmother, Paternal Grandmother    Heart failure     Hypertension Mother        Tobacco Use    Smoking status: Never Smoker    Smokeless tobacco: Never Used   Substance and Sexual Activity    Alcohol use: No     Frequency: 2-4 times a month     Drinks per session: 1 or 2     Binge frequency: Never    Drug use: No    Sexual activity: Not Currently     Review of Systems   Constitution: Negative for fever and malaise/fatigue.   HENT: Negative for hearing loss.    Cardiovascular: Negative for chest pain, dyspnea on exertion, irregular heartbeat, leg swelling, palpitations and syncope.   Hematologic/Lymphatic: Negative for bleeding problem.   Gastrointestinal: Negative for heartburn.   Neurological: Negative for light-headedness.   Psychiatric/Behavioral: Negative for altered mental status. The patient is not nervous/anxious.      Objective:     Vital Signs (Most Recent):  Temp: 98.5 °F (36.9 °C) (12/07/20 0606)  Pulse: 88 (12/07/20 0606)  Resp: 18  (12/07/20 0606)  BP: (!) 166/79 (12/07/20 0608)  SpO2: 97 % (12/07/20 0606) Vital Signs (24h Range):  Temp:  [98.5 °F (36.9 °C)] 98.5 °F (36.9 °C)  Pulse:  [88] 88  Resp:  [18] 18  SpO2:  [97 %] 97 %  BP: (166-176)/(79-94) 166/79       Weight: 129.7 kg (286 lb)  Body mass index is 49.09 kg/m².    SpO2: 97 %       Physical Exam   Constitutional: She is oriented to person, place, and time. Vital signs are normal. She appears well-developed and well-nourished.   Cardiovascular: Normal rate, regular rhythm, S1 normal, S2 normal, normal heart sounds and intact distal pulses.   Pulmonary/Chest: Effort normal and breath sounds normal.   Musculoskeletal: Normal range of motion.         General: No edema.   Neurological: She is alert and oriented to person, place, and time. She has normal strength.   Skin: Skin is warm, dry and intact.   Vitals reviewed.        Assessment and Plan:     Pacemaker lead malfunction  Prior to procedure, we discussed the alternatives, benefits and risks of the procedure including pain, infection, bleeding, injury to lung causing pneumothorax requiring tube placement, injury to heart valves, puncture of the heart leading to pericardial effusion or tamponade requiring tube drainage, heart attack, stroke and death.  Ms. Chawla voices understanding and all questions have been answered.   - Last dose of xarelto on 12/03/20   - Proceed with LV lead extraction and reimplantation     - Anesthesia for sedation         Neelam Warner NP  Cardiac Electrophysiology  [facility] - Short Stay Cardiac Unit

## 2020-12-07 NOTE — HPI
54 y/o F with SCA/VF/AV block s/p ICD with subsequent development of CM (EF 40%) in the setting of normal PET stress and chronic RV pacing. She underwent an upgrade to CRT-D. Diagnosed with LV lead fracture in 8/2020 and presents today for LV lead extraction and reimplantation.     Dysphagia or odynophagia:  No  Liver Disease, esophageal disease, or known varices:  No  Upper GI Bleeding: No  Snoring:  No  Sleep Apnea:  Yes  Prior neck surgery or radiation:  No  History of anesthetic difficulties:  No  Family history of anesthetic difficulties:  No  Last oral intake:  12 hours ago  Able to move neck in all directions:  Yes  Stroke History:  Yes  Last dose of anticoagulation:  12/02/20    Anticoagulation: Eliquis      Cardiac meds - carvedilol 25 mg BID, clopidogrel 75 mg, furosemide 20 mg daily, losartan 100 mg daily, rivaroxaban 20 mg daily    TTE 9/15/20  · Low normal left ventricular systolic function. The estimated ejection fraction is 50%.  · Concentric left ventricular remodeling.· Mild left atrial enlargement.  · Grade I (mild) left ventricular diastolic dysfunction consistent with impaired relaxation.  · Normal right ventricular systolic function.  · Normal central venous pressure (3 mmHg).  · The estimated PA systolic pressure is 33 mmHg.

## 2020-12-07 NOTE — ASSESSMENT & PLAN NOTE
1. FRITZ for assistance during LV lead extraction  -No absolute contraindications of esophageal stricture, tumor, perforation, laceration,or diverticulum and/or active GI bleed  -The risks, benefits & alternatives of the procedure were explained to the patient.   -The risks of transesophageal echo include but are not limited to:  Dental trauma, esophageal trauma/perforation, bleeding, laryngospasm/brochospasm, aspiration, sore throat/hoarseness, & dislodgement of the endotracheal tube/nasogastric tube (where applicable).    -The risks of moderate sedation include hypotension, respiratory depression, arrhythmias, bronchospasm, & death.    -Informed consent was obtained. The patient is agreeable to proceed with the procedure and all questions and concerns addressed.    Case discussed with an attending in echocardiography lab.     Further recommendations per attending addendum

## 2020-12-07 NOTE — PROGRESS NOTES
mile morris ep np ordered norvasc po per mar.  sscu rn avery aware.  Not in ep pacu pyxis. Message sent.  sscu rn to give once meds arrives from pharmacy.  Text message sent to daughter regarding pt awaiting csu bed, transferred to sscu 02.

## 2020-12-07 NOTE — NURSING TRANSFER
Nursing Transfer Note      12/7/2020     Transfer To: ep pacu 4 to sscu 02    Transfer via bed  Transfer with cardiac monitoring, tele box on sscu 0    Transported by joao acuña and jose sscu transporter/pct    Medicines sent: [facility]    Chart send with patient: Yes    Notified: daughter    Patient reassessed at: 12/7/2020 1700    Upon arrival to floor: cardiac monitor applied, patient oriented to room, call bell in reach and bed in lowest position

## 2020-12-08 VITALS
WEIGHT: 286 LBS | RESPIRATION RATE: 17 BRPM | HEART RATE: 60 BPM | DIASTOLIC BLOOD PRESSURE: 73 MMHG | TEMPERATURE: 98 F | HEIGHT: 64 IN | SYSTOLIC BLOOD PRESSURE: 144 MMHG | BODY MASS INDEX: 48.83 KG/M2 | OXYGEN SATURATION: 91 %

## 2020-12-08 DIAGNOSIS — K21.9 GASTROESOPHAGEAL REFLUX DISEASE WITHOUT ESOPHAGITIS: ICD-10-CM

## 2020-12-08 PROCEDURE — 25000003 PHARM REV CODE 250: Performed by: NURSE PRACTITIONER

## 2020-12-08 RX ORDER — DOXYCYCLINE HYCLATE 100 MG
100 TABLET ORAL EVERY 12 HOURS
Qty: 10 TABLET | Refills: 0 | Status: SHIPPED | OUTPATIENT
Start: 2020-12-08 | End: 2020-12-13

## 2020-12-08 RX ORDER — HYDROCODONE BITARTRATE AND ACETAMINOPHEN 7.5; 325 MG/1; MG/1
1 TABLET ORAL ONCE
Status: COMPLETED | OUTPATIENT
Start: 2020-12-08 | End: 2020-12-08

## 2020-12-08 RX ADMIN — GABAPENTIN 900 MG: 300 CAPSULE ORAL at 09:12

## 2020-12-08 RX ADMIN — CARVEDILOL 25 MG: 25 TABLET, FILM COATED ORAL at 09:12

## 2020-12-08 RX ADMIN — FUROSEMIDE 20 MG: 20 TABLET ORAL at 09:12

## 2020-12-08 RX ADMIN — CLOPIDOGREL 75 MG: 75 TABLET, FILM COATED ORAL at 09:12

## 2020-12-08 RX ADMIN — HYDROCODONE BITARTRATE AND ACETAMINOPHEN 1 TABLET: 7.5; 325 TABLET ORAL at 09:12

## 2020-12-08 RX ADMIN — DOXYCYCLINE HYCLATE 100 MG: 100 TABLET, COATED ORAL at 09:12

## 2020-12-08 RX ADMIN — LOSARTAN POTASSIUM 100 MG: 50 TABLET, FILM COATED ORAL at 09:12

## 2020-12-08 RX ADMIN — AMLODIPINE BESYLATE 5 MG: 5 TABLET ORAL at 09:12

## 2020-12-08 RX ADMIN — GABAPENTIN 900 MG: 300 CAPSULE ORAL at 03:12

## 2020-12-08 RX ADMIN — PANTOPRAZOLE SODIUM 40 MG: 40 TABLET, DELAYED RELEASE ORAL at 09:12

## 2020-12-08 RX ADMIN — ARIPIPRAZOLE 15 MG: 5 TABLET ORAL at 09:12

## 2020-12-08 NOTE — NURSING
Pt discharged to home by wheelchair via [facility], accompanied by sister Alba. Belongings accounted for. IV and Telemetry removed. Medications delivered to preferred pharmacy. Discharge education provided, questions encouraged and answered. Pt verbalizes understanding.

## 2020-12-08 NOTE — HOSPITAL COURSE
Ms. Chawla underwent LV lead extraction and replacement and Biotronik generator change.  She tolerated the procedure well and there were no acute complications.   Left pectoral site CDI.  No bleeding or hematoma noted. Aquacel and pressure dressings in place.  Left arm sling in place.  Post-procedure CXR stable without evidence of pneumothorax.  ECG and tele reviewed without evidence of arrhythmias. Completed intra op abx, plan to discharge home on Doxcycline 100 mg bid x 5 days for surgical prophylaxis.  Instructed to hold Xarelto and to resume following completion of abx.  Plan for 1 week follow up with device clinic and 3 month follow up with Dr. Mejia. Discharge instructions discussed and all questions answered. Ms. Chawla was discharged home in stable condition.

## 2020-12-08 NOTE — NURSING TRANSFER
Nursing Transfer Note      12/7/2020     Transfer To: 304    Transfer via bed    Transfer with cardiac monitoring    Transported by RN x1 & PCT x1    Medicines sent: amlodipine    Chart send with patient: Yes    Notified: daughter    Patient reassessed at: see epic     Upon arrival to floor: cardiac monitor applied, patient oriented to room, call bell in reach and bed in lowest position, no complaints, no sign of distress

## 2020-12-08 NOTE — NURSING TRANSFER
Nursing Transfer Note      12/8/2020     Transfer From: Cardiac Short stay unit    Transfer via bed    Transfer with cardiac monitoring    Transported by Ese WALLACE    Medicines sent: none    Chart send with patient: Yes    Notified: daughter    Patient reassessed at: 12/7/20 @ 2200     Upon arrival to floor: cardiac monitor applied, patient oriented to room, call bell in reach and bed in lowest position.

## 2020-12-08 NOTE — ASSESSMENT & PLAN NOTE
s/p LV lead extraction and replacement and generator change    Plan:  - Doxycycline 100 mg bid x 5 days for surgical prophylaxis  - Resume OAC in 5 days post procedure   - Resume PTA medications   - Follow up in device clinic in 1 week (12/14/20)  - Follow up with Dr. Mejia in 3 months   - Discharge plans/instructions discussed with patient who verbalized understanding and agreement of plans of care.
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
## 2020-12-08 NOTE — PLAN OF CARE
Pt remained free from falls/trauma/injuries. No complaints of CP/SOB/discomfort. Pacer pressure dressing in place. Sling in place with ice. VSS. NSR mid 60s on tele. Fall bundle in place. POC explained, no questions at this time. Pt tolerating care.

## 2020-12-09 RX ORDER — PANTOPRAZOLE SODIUM 40 MG/1
40 TABLET, DELAYED RELEASE ORAL DAILY
Qty: 90 TABLET | Refills: 3 | Status: SHIPPED | OUTPATIENT
Start: 2020-12-09 | End: 2022-01-06

## 2020-12-11 LAB
BLD PROD TYP BPU: NORMAL
BLOOD UNIT EXPIRATION DATE: NORMAL
BLOOD UNIT TYPE CODE: 6200
BLOOD UNIT TYPE: NORMAL
CODING SYSTEM: NORMAL
DISPENSE STATUS: NORMAL
TRANS ERYTHROCYTES VOL PATIENT: NORMAL ML

## 2020-12-14 ENCOUNTER — TELEPHONE (OUTPATIENT)
Dept: ELECTROPHYSIOLOGY | Facility: CLINIC | Age: 55
End: 2020-12-14

## 2020-12-14 ENCOUNTER — CLINICAL SUPPORT (OUTPATIENT)
Dept: CARDIOLOGY | Facility: HOSPITAL | Age: 55
End: 2020-12-14
Attending: INTERNAL MEDICINE
Payer: MEDICARE

## 2020-12-14 DIAGNOSIS — Z95.810 PRESENCE OF AUTOMATIC (IMPLANTABLE) CARDIAC DEFIBRILLATOR: ICD-10-CM

## 2020-12-14 DIAGNOSIS — T82.110D PACEMAKER LEAD MALFUNCTION, SUBSEQUENT ENCOUNTER: ICD-10-CM

## 2020-12-14 PROCEDURE — 93284 PRGRMG EVAL IMPLANTABLE DFB: CPT | Mod: 26,,, | Performed by: INTERNAL MEDICINE

## 2020-12-14 PROCEDURE — 93284 PRGRMG EVAL IMPLANTABLE DFB: CPT

## 2020-12-14 PROCEDURE — 93284 CARDIAC DEVICE CHECK - IN CLINIC & HOSPITAL: ICD-10-PCS | Mod: 26,,, | Performed by: INTERNAL MEDICINE

## 2020-12-14 NOTE — TELEPHONE ENCOUNTER
Spoke with  to schedule a post procedure follow up with  office. Pt is now scheduled on Monday 03/15 with EKG prior. A appointment will be mailed as a reminder. Pt verbalized understanding.

## 2020-12-15 NOTE — ADDENDUM NOTE
Addendum  created 12/15/20 1246 by Rob Schroeder MD    Clinical Note Signed, Intraprocedure Blocks edited

## 2020-12-18 NOTE — PROGRESS NOTES
Digital Medicine: Health  Follow-Up    The history is provided by the patient.                 Patient needs assistance troubleshooting: patient reminder needed.      Topics Covered on Call: device use    Additional Follow-up details: Spoke to patient briefly about her missing readings. She states she will take a reading tomorrow and begin checking at home. Instructed her to charge device prior to using again.             Diet-Not assessed          Physical Activity-Not assessed    Medication Adherence-Medication Adherence not addressed.      Substance, Sleep, Stress-Not assessed      Additional monitoring needed.  Instructed to charge device.       Addressed patient questions and patient has my contact information if needed prior to next outreach. Patient verbalizes understanding.             There are no preventive care reminders to display for this patient.      Last 5 Patient Entered Readings                                      Current 30 Day Average:      Recent Readings 9/21/2020 9/21/2020 9/9/2020 9/9/2020 9/9/2020    SBP (mmHg) 158 158 158 158 145    DBP (mmHg) 91 91 88 88 104    Pulse 87 87 62 62 62

## 2020-12-23 ENCOUNTER — PATIENT MESSAGE (OUTPATIENT)
Dept: NEUROLOGY | Facility: CLINIC | Age: 55
End: 2020-12-23

## 2020-12-31 ENCOUNTER — EXTERNAL CHRONIC CARE MANAGEMENT (OUTPATIENT)
Dept: PRIMARY CARE CLINIC | Facility: CLINIC | Age: 55
End: 2020-12-31
Payer: MEDICARE

## 2020-12-31 PROCEDURE — 99490 CHRNC CARE MGMT STAFF 1ST 20: CPT | Mod: S$PBB,,, | Performed by: INTERNAL MEDICINE

## 2020-12-31 PROCEDURE — 99490 PR CHRONIC CARE MGMT, 1ST 20 MIN: ICD-10-PCS | Mod: S$PBB,,, | Performed by: INTERNAL MEDICINE

## 2020-12-31 PROCEDURE — 99490 CHRNC CARE MGMT STAFF 1ST 20: CPT | Mod: PBBFAC | Performed by: INTERNAL MEDICINE

## 2021-01-06 ENCOUNTER — PATIENT MESSAGE (OUTPATIENT)
Dept: NEUROLOGY | Facility: CLINIC | Age: 56
End: 2021-01-06

## 2021-01-07 ENCOUNTER — TELEPHONE (OUTPATIENT)
Dept: NEUROLOGY | Facility: CLINIC | Age: 56
End: 2021-01-07
Payer: MEDICARE

## 2021-01-07 NOTE — TELEPHONE ENCOUNTER
Patient unable to be seen by home health, patient prefers to be travel to infusion center. Plans to be there for 1 pm at [facility]. Kylah Rangel notified.

## 2021-01-07 NOTE — TELEPHONE ENCOUNTER
Returned call to patient. Infusion protocol began per Dr. Cortez. Orders faxed over to infusion pharmacy.

## 2021-01-07 NOTE — TELEPHONE ENCOUNTER
----- Message from Sandra Coronado sent at 1/7/2021  9:45 AM CST -----  Contact: Pt @ [phone]  PT is requesting to speak to Divine about get her infusion today.

## 2021-01-11 DIAGNOSIS — G43.711 CHRONIC MIGRAINE WITHOUT AURA, WITH INTRACTABLE MIGRAINE, SO STATED, WITH STATUS MIGRAINOSUS: Primary | ICD-10-CM

## 2021-01-19 ENCOUNTER — PATIENT OUTREACH (OUTPATIENT)
Dept: ADMINISTRATIVE | Facility: OTHER | Age: 56
End: 2021-01-19

## 2021-01-21 ENCOUNTER — PROCEDURE VISIT (OUTPATIENT)
Dept: NEUROLOGY | Facility: CLINIC | Age: 56
End: 2021-01-21
Payer: MEDICARE

## 2021-01-21 VITALS
HEIGHT: 64 IN | BODY MASS INDEX: 50.02 KG/M2 | DIASTOLIC BLOOD PRESSURE: 84 MMHG | WEIGHT: 293 LBS | SYSTOLIC BLOOD PRESSURE: 142 MMHG | HEART RATE: 73 BPM

## 2021-01-21 DIAGNOSIS — G43.711 CHRONIC MIGRAINE WITHOUT AURA, WITH INTRACTABLE MIGRAINE, SO STATED, WITH STATUS MIGRAINOSUS: Primary | ICD-10-CM

## 2021-01-21 PROCEDURE — 99499 NO LOS: ICD-10-PCS | Mod: S$PBB,,, | Performed by: PSYCHIATRY & NEUROLOGY

## 2021-01-21 PROCEDURE — 64615 PR CHEMODENERVATION OF MUSCLE FOR CHRONIC MIGRAINE: ICD-10-PCS | Mod: S$PBB,,, | Performed by: PSYCHIATRY & NEUROLOGY

## 2021-01-21 PROCEDURE — 64615 CHEMODENERV MUSC MIGRAINE: CPT | Mod: S$PBB,,, | Performed by: PSYCHIATRY & NEUROLOGY

## 2021-01-21 PROCEDURE — 64615 CHEMODENERV MUSC MIGRAINE: CPT | Mod: PBBFAC | Performed by: PSYCHIATRY & NEUROLOGY

## 2021-01-21 PROCEDURE — 99499 UNLISTED E&M SERVICE: CPT | Mod: S$PBB,,, | Performed by: PSYCHIATRY & NEUROLOGY

## 2021-01-21 RX ORDER — ETODOLAC 500 MG/1
500 TABLET, FILM COATED ORAL 2 TIMES DAILY PRN
Qty: 30 TABLET | Refills: 12 | Status: SHIPPED | OUTPATIENT
Start: 2021-01-21 | End: 2023-11-16 | Stop reason: SDUPTHER

## 2021-01-21 RX ADMIN — ONABOTULINUMTOXINA 200 UNITS: 100 INJECTION, POWDER, LYOPHILIZED, FOR SOLUTION INTRADERMAL; INTRAMUSCULAR at 10:01

## 2021-01-22 ENCOUNTER — TELEPHONE (OUTPATIENT)
Dept: NEUROLOGY | Facility: CLINIC | Age: 56
End: 2021-01-22

## 2021-01-22 ENCOUNTER — OFFICE VISIT (OUTPATIENT)
Dept: NEUROLOGY | Facility: CLINIC | Age: 56
End: 2021-01-22
Payer: MEDICARE

## 2021-01-22 DIAGNOSIS — Z86.73 HISTORY OF CVA (CEREBROVASCULAR ACCIDENT): ICD-10-CM

## 2021-01-22 DIAGNOSIS — F33.1 DEPRESSION, MAJOR, RECURRENT, MODERATE: ICD-10-CM

## 2021-01-22 DIAGNOSIS — F06.70 MILD NEUROCOGNITIVE DISORDER DUE TO ANOTHER MEDICAL CONDITION: Primary | ICD-10-CM

## 2021-01-22 PROCEDURE — 96116 NUBHVL XM PHYS/QHP 1ST HR: CPT | Mod: 95,,, | Performed by: PSYCHIATRY & NEUROLOGY

## 2021-01-22 PROCEDURE — 96116 PR NEUROBEHAVIORAL STATUS EXAM BY PSYCH/PHYS: ICD-10-PCS | Mod: 95,,, | Performed by: PSYCHIATRY & NEUROLOGY

## 2021-01-22 PROCEDURE — 99499 NO LOS: ICD-10-PCS | Mod: 95,,, | Performed by: PSYCHIATRY & NEUROLOGY

## 2021-01-22 PROCEDURE — 99499 UNLISTED E&M SERVICE: CPT | Mod: 95,,, | Performed by: PSYCHIATRY & NEUROLOGY

## 2021-01-23 PROBLEM — F06.70 MILD NEUROCOGNITIVE DISORDER DUE TO ANOTHER MEDICAL CONDITION: Status: ACTIVE | Noted: 2021-01-23

## 2021-01-25 ENCOUNTER — TELEPHONE (OUTPATIENT)
Dept: NEUROLOGY | Facility: CLINIC | Age: 56
End: 2021-01-25

## 2021-01-31 ENCOUNTER — EXTERNAL CHRONIC CARE MANAGEMENT (OUTPATIENT)
Dept: PRIMARY CARE CLINIC | Facility: CLINIC | Age: 56
End: 2021-01-31
Payer: MEDICARE

## 2021-01-31 PROCEDURE — 99490 CHRNC CARE MGMT STAFF 1ST 20: CPT | Mod: S$PBB,,, | Performed by: INTERNAL MEDICINE

## 2021-01-31 PROCEDURE — 99490 PR CHRONIC CARE MGMT, 1ST 20 MIN: ICD-10-PCS | Mod: S$PBB,,, | Performed by: INTERNAL MEDICINE

## 2021-01-31 PROCEDURE — 99490 CHRNC CARE MGMT STAFF 1ST 20: CPT | Mod: PBBFAC | Performed by: INTERNAL MEDICINE

## 2021-02-01 ENCOUNTER — PATIENT MESSAGE (OUTPATIENT)
Dept: INTERNAL MEDICINE | Facility: CLINIC | Age: 56
End: 2021-02-01

## 2021-02-03 ENCOUNTER — OFFICE VISIT (OUTPATIENT)
Dept: NEUROLOGY | Facility: CLINIC | Age: 56
End: 2021-02-03
Payer: MEDICARE

## 2021-02-03 ENCOUNTER — PATIENT MESSAGE (OUTPATIENT)
Dept: NEUROLOGY | Facility: CLINIC | Age: 56
End: 2021-02-03

## 2021-02-03 DIAGNOSIS — Z86.73 HISTORY OF CVA (CEREBROVASCULAR ACCIDENT): ICD-10-CM

## 2021-02-03 DIAGNOSIS — F33.1 DEPRESSION, MAJOR, RECURRENT, MODERATE: ICD-10-CM

## 2021-02-03 DIAGNOSIS — F41.9 ANXIETY: ICD-10-CM

## 2021-02-03 DIAGNOSIS — F06.70 MILD NEUROCOGNITIVE DISORDER DUE TO ANOTHER MEDICAL CONDITION: Primary | ICD-10-CM

## 2021-02-03 DIAGNOSIS — F33.1 MODERATE EPISODE OF RECURRENT MAJOR DEPRESSIVE DISORDER: ICD-10-CM

## 2021-02-03 PROCEDURE — 96133 NRPSYC TST EVAL PHYS/QHP EA: CPT | Mod: ,,, | Performed by: PSYCHIATRY & NEUROLOGY

## 2021-02-03 PROCEDURE — 96132 NRPSYC TST EVAL PHYS/QHP 1ST: CPT | Mod: ,,, | Performed by: PSYCHIATRY & NEUROLOGY

## 2021-02-03 PROCEDURE — 96139 PR PSYCH/NEUROPSYCH TEST ADMIN/SCORING, BY TECH, 2+ TESTS, EA ADDTL 30 MIN: ICD-10-PCS | Mod: ,,, | Performed by: PSYCHIATRY & NEUROLOGY

## 2021-02-03 PROCEDURE — 99999 PR PBB SHADOW E&M-EST. PATIENT-LVL I: ICD-10-PCS | Mod: PBBFAC,,, | Performed by: PSYCHIATRY & NEUROLOGY

## 2021-02-03 PROCEDURE — 99499 UNLISTED E&M SERVICE: CPT | Mod: S$PBB,,, | Performed by: PSYCHIATRY & NEUROLOGY

## 2021-02-03 PROCEDURE — 99999 PR PBB SHADOW E&M-EST. PATIENT-LVL I: CPT | Mod: PBBFAC,,, | Performed by: PSYCHIATRY & NEUROLOGY

## 2021-02-03 PROCEDURE — 96132 PR NEUROPSYCHOLOGIC TEST EVAL SVCS, 1ST HR: ICD-10-PCS | Mod: ,,, | Performed by: PSYCHIATRY & NEUROLOGY

## 2021-02-03 PROCEDURE — 99499 NO LOS: ICD-10-PCS | Mod: S$PBB,,, | Performed by: PSYCHIATRY & NEUROLOGY

## 2021-02-03 PROCEDURE — 96133 PR NEUROPSYCHOLOGIC TEST EVAL SVCS, EA ADDTL HR: ICD-10-PCS | Mod: ,,, | Performed by: PSYCHIATRY & NEUROLOGY

## 2021-02-03 PROCEDURE — 96138 PR PSYCH/NEUROPSYCH TEST ADMIN/SCORING, BY TECH, 2+ TESTS, 1ST 30 MIN: ICD-10-PCS | Mod: ,,, | Performed by: PSYCHIATRY & NEUROLOGY

## 2021-02-03 PROCEDURE — 96138 PSYCL/NRPSYC TECH 1ST: CPT | Mod: ,,, | Performed by: PSYCHIATRY & NEUROLOGY

## 2021-02-03 PROCEDURE — 96139 PSYCL/NRPSYC TST TECH EA: CPT | Mod: ,,, | Performed by: PSYCHIATRY & NEUROLOGY

## 2021-02-03 PROCEDURE — 99211 OFF/OP EST MAY X REQ PHY/QHP: CPT | Mod: PBBFAC | Performed by: PSYCHIATRY & NEUROLOGY

## 2021-02-05 ENCOUNTER — TELEPHONE (OUTPATIENT)
Dept: CARDIOLOGY | Facility: HOSPITAL | Age: 56
End: 2021-02-05

## 2021-02-10 ENCOUNTER — PATIENT MESSAGE (OUTPATIENT)
Dept: ELECTROPHYSIOLOGY | Facility: CLINIC | Age: 56
End: 2021-02-10

## 2021-02-11 ENCOUNTER — PATIENT MESSAGE (OUTPATIENT)
Dept: INTERNAL MEDICINE | Facility: CLINIC | Age: 56
End: 2021-02-11

## 2021-02-11 RX ORDER — RIVAROXABAN 20 MG/1
TABLET, FILM COATED ORAL
Qty: 30 TABLET | Refills: 11 | Status: SHIPPED | OUTPATIENT
Start: 2021-02-11 | End: 2022-02-14

## 2021-02-12 ENCOUNTER — OFFICE VISIT (OUTPATIENT)
Dept: URGENT CARE | Facility: CLINIC | Age: 56
End: 2021-02-12
Payer: MEDICARE

## 2021-02-12 VITALS
RESPIRATION RATE: 20 BRPM | DIASTOLIC BLOOD PRESSURE: 96 MMHG | WEIGHT: 293 LBS | HEART RATE: 83 BPM | OXYGEN SATURATION: 98 % | HEIGHT: 64 IN | TEMPERATURE: 98 F | BODY MASS INDEX: 50.02 KG/M2 | SYSTOLIC BLOOD PRESSURE: 174 MMHG

## 2021-02-12 DIAGNOSIS — G43.711 CHRONIC MIGRAINE WITHOUT AURA, WITH INTRACTABLE MIGRAINE, SO STATED, WITH STATUS MIGRAINOSUS: Primary | ICD-10-CM

## 2021-02-12 DIAGNOSIS — M54.6 ACUTE LEFT-SIDED THORACIC BACK PAIN: Primary | ICD-10-CM

## 2021-02-12 PROCEDURE — 96372 PR INJECTION,THERAP/PROPH/DIAG2ST, IM OR SUBCUT: ICD-10-PCS | Mod: S$GLB,,, | Performed by: NURSE PRACTITIONER

## 2021-02-12 PROCEDURE — 99214 PR OFFICE/OUTPT VISIT, EST, LEVL IV, 30-39 MIN: ICD-10-PCS | Mod: 25,S$GLB,, | Performed by: NURSE PRACTITIONER

## 2021-02-12 PROCEDURE — 99214 OFFICE O/P EST MOD 30 MIN: CPT | Mod: 25,S$GLB,, | Performed by: NURSE PRACTITIONER

## 2021-02-12 PROCEDURE — 96372 THER/PROPH/DIAG INJ SC/IM: CPT | Mod: S$GLB,,, | Performed by: NURSE PRACTITIONER

## 2021-02-12 RX ORDER — KETOROLAC TROMETHAMINE 30 MG/ML
30 INJECTION, SOLUTION INTRAMUSCULAR; INTRAVENOUS
Status: COMPLETED | OUTPATIENT
Start: 2021-02-12 | End: 2021-02-12

## 2021-02-12 RX ORDER — TIZANIDINE 4 MG/1
4 TABLET ORAL EVERY 8 HOURS
Qty: 15 TABLET | Refills: 0 | Status: SHIPPED | OUTPATIENT
Start: 2021-02-12 | End: 2021-02-17

## 2021-02-12 RX ADMIN — KETOROLAC TROMETHAMINE 30 MG: 30 INJECTION, SOLUTION INTRAMUSCULAR; INTRAVENOUS at 06:02

## 2021-02-21 ENCOUNTER — PATIENT MESSAGE (OUTPATIENT)
Dept: ADMINISTRATIVE | Facility: OTHER | Age: 56
End: 2021-02-21

## 2021-02-23 ENCOUNTER — PATIENT MESSAGE (OUTPATIENT)
Dept: NEUROLOGY | Facility: CLINIC | Age: 56
End: 2021-02-23

## 2021-02-23 ENCOUNTER — OFFICE VISIT (OUTPATIENT)
Dept: NEUROLOGY | Facility: CLINIC | Age: 56
End: 2021-02-23
Payer: MEDICARE

## 2021-02-23 DIAGNOSIS — Z86.73 HISTORY OF CVA (CEREBROVASCULAR ACCIDENT): ICD-10-CM

## 2021-02-23 DIAGNOSIS — F33.1 DEPRESSION, MAJOR, RECURRENT, MODERATE: Primary | ICD-10-CM

## 2021-02-23 DIAGNOSIS — F06.70 MILD NEUROCOGNITIVE DISORDER DUE TO ANOTHER MEDICAL CONDITION: ICD-10-CM

## 2021-02-23 PROCEDURE — 99499 NO LOS: ICD-10-PCS | Mod: 95,,, | Performed by: PSYCHIATRY & NEUROLOGY

## 2021-02-23 PROCEDURE — 99499 UNLISTED E&M SERVICE: CPT | Mod: 95,,, | Performed by: PSYCHIATRY & NEUROLOGY

## 2021-02-28 ENCOUNTER — EXTERNAL CHRONIC CARE MANAGEMENT (OUTPATIENT)
Dept: PRIMARY CARE CLINIC | Facility: CLINIC | Age: 56
End: 2021-02-28
Payer: MEDICARE

## 2021-02-28 PROCEDURE — 99490 CHRNC CARE MGMT STAFF 1ST 20: CPT | Mod: S$PBB,,, | Performed by: INTERNAL MEDICINE

## 2021-02-28 PROCEDURE — 99490 CHRNC CARE MGMT STAFF 1ST 20: CPT | Mod: PBBFAC | Performed by: INTERNAL MEDICINE

## 2021-02-28 PROCEDURE — 99490 PR CHRONIC CARE MGMT, 1ST 20 MIN: ICD-10-PCS | Mod: S$PBB,,, | Performed by: INTERNAL MEDICINE

## 2021-03-12 ENCOUNTER — PATIENT OUTREACH (OUTPATIENT)
Dept: ADMINISTRATIVE | Facility: OTHER | Age: 56
End: 2021-03-12

## 2021-03-15 ENCOUNTER — CLINICAL SUPPORT (OUTPATIENT)
Dept: CARDIOLOGY | Facility: HOSPITAL | Age: 56
End: 2021-03-15
Attending: INTERNAL MEDICINE
Payer: MEDICARE

## 2021-03-15 ENCOUNTER — HOSPITAL ENCOUNTER (OUTPATIENT)
Dept: CARDIOLOGY | Facility: CLINIC | Age: 56
Discharge: HOME | End: 2021-03-15
Payer: MEDICARE

## 2021-03-15 ENCOUNTER — OFFICE VISIT (OUTPATIENT)
Dept: ELECTROPHYSIOLOGY | Facility: CLINIC | Age: 56
End: 2021-03-15
Payer: MEDICARE

## 2021-03-15 VITALS
SYSTOLIC BLOOD PRESSURE: 138 MMHG | WEIGHT: 293 LBS | DIASTOLIC BLOOD PRESSURE: 74 MMHG | BODY MASS INDEX: 50.02 KG/M2 | HEART RATE: 62 BPM | HEIGHT: 64 IN

## 2021-03-15 DIAGNOSIS — G47.33 OBSTRUCTIVE SLEEP APNEA: ICD-10-CM

## 2021-03-15 DIAGNOSIS — Z86.718 HISTORY OF DVT (DEEP VEIN THROMBOSIS): ICD-10-CM

## 2021-03-15 DIAGNOSIS — T82.110D PACEMAKER LEAD MALFUNCTION, SUBSEQUENT ENCOUNTER: ICD-10-CM

## 2021-03-15 DIAGNOSIS — I48.0 PAROXYSMAL ATRIAL FIBRILLATION: ICD-10-CM

## 2021-03-15 DIAGNOSIS — I44.2 COMPLETE AV BLOCK: ICD-10-CM

## 2021-03-15 DIAGNOSIS — I49.8 OTHER SPECIFIED CARDIAC ARRHYTHMIAS: ICD-10-CM

## 2021-03-15 DIAGNOSIS — I42.8 NONISCHEMIC CARDIOMYOPATHY: Primary | ICD-10-CM

## 2021-03-15 DIAGNOSIS — Z95.0 PACEMAKER: ICD-10-CM

## 2021-03-15 DIAGNOSIS — Z86.73 HISTORY OF TIA (TRANSIENT ISCHEMIC ATTACK): ICD-10-CM

## 2021-03-15 DIAGNOSIS — Z86.74 HISTORY OF SUDDEN CARDIAC ARREST: ICD-10-CM

## 2021-03-15 PROCEDURE — 99215 OFFICE O/P EST HI 40 MIN: CPT | Mod: PBBFAC,25 | Performed by: NURSE PRACTITIONER

## 2021-03-15 PROCEDURE — 93010 RHYTHM STRIP: ICD-10-PCS | Mod: S$PBB,,, | Performed by: INTERNAL MEDICINE

## 2021-03-15 PROCEDURE — 93005 ELECTROCARDIOGRAM TRACING: CPT | Mod: PBBFAC | Performed by: INTERNAL MEDICINE

## 2021-03-15 PROCEDURE — 99214 PR OFFICE/OUTPT VISIT, EST, LEVL IV, 30-39 MIN: ICD-10-PCS | Mod: S$PBB,,, | Performed by: NURSE PRACTITIONER

## 2021-03-15 PROCEDURE — 93284 PRGRMG EVAL IMPLANTABLE DFB: CPT | Mod: 26,,, | Performed by: INTERNAL MEDICINE

## 2021-03-15 PROCEDURE — 99999 PR PBB SHADOW E&M-EST. PATIENT-LVL V: ICD-10-PCS | Mod: PBBFAC,,, | Performed by: NURSE PRACTITIONER

## 2021-03-15 PROCEDURE — 99999 PR PBB SHADOW E&M-EST. PATIENT-LVL V: CPT | Mod: PBBFAC,,, | Performed by: NURSE PRACTITIONER

## 2021-03-15 PROCEDURE — 93284 CARDIAC DEVICE CHECK - IN CLINIC & HOSPITAL: ICD-10-PCS | Mod: 26,,, | Performed by: INTERNAL MEDICINE

## 2021-03-15 PROCEDURE — 93284 PRGRMG EVAL IMPLANTABLE DFB: CPT

## 2021-03-15 PROCEDURE — 93010 ELECTROCARDIOGRAM REPORT: CPT | Mod: S$PBB,,, | Performed by: INTERNAL MEDICINE

## 2021-03-15 PROCEDURE — 99214 OFFICE O/P EST MOD 30 MIN: CPT | Mod: S$PBB,,, | Performed by: NURSE PRACTITIONER

## 2021-03-18 ENCOUNTER — CLINICAL SUPPORT (OUTPATIENT)
Dept: CARDIOLOGY | Facility: HOSPITAL | Age: 56
End: 2021-03-18
Payer: MEDICARE

## 2021-03-22 ENCOUNTER — OFFICE VISIT (OUTPATIENT)
Dept: PSYCHIATRY | Facility: CLINIC | Age: 56
End: 2021-03-22
Payer: MEDICARE

## 2021-03-22 DIAGNOSIS — F06.70 MILD NEUROCOGNITIVE DISORDER DUE TO ANOTHER MEDICAL CONDITION: ICD-10-CM

## 2021-03-22 DIAGNOSIS — F33.1 DEPRESSION, MAJOR, RECURRENT, MODERATE: Primary | ICD-10-CM

## 2021-03-22 PROCEDURE — 90834 PR PSYCHOTHERAPY W/PATIENT, 45 MIN: ICD-10-PCS | Mod: 95,,, | Performed by: SOCIAL WORKER

## 2021-03-22 PROCEDURE — 90834 PSYTX W PT 45 MINUTES: CPT | Mod: 95,,, | Performed by: SOCIAL WORKER

## 2021-03-29 ENCOUNTER — TELEPHONE (OUTPATIENT)
Dept: NEUROLOGY | Facility: CLINIC | Age: 56
End: 2021-03-29

## 2021-03-31 ENCOUNTER — EXTERNAL CHRONIC CARE MANAGEMENT (OUTPATIENT)
Dept: PRIMARY CARE CLINIC | Facility: CLINIC | Age: 56
End: 2021-03-31
Payer: MEDICARE

## 2021-03-31 PROCEDURE — 99490 PR CHRONIC CARE MGMT, 1ST 20 MIN: ICD-10-PCS | Mod: S$PBB,,, | Performed by: INTERNAL MEDICINE

## 2021-03-31 PROCEDURE — 99490 CHRNC CARE MGMT STAFF 1ST 20: CPT | Mod: PBBFAC | Performed by: INTERNAL MEDICINE

## 2021-03-31 PROCEDURE — 99490 CHRNC CARE MGMT STAFF 1ST 20: CPT | Mod: S$PBB,,, | Performed by: INTERNAL MEDICINE

## 2021-04-01 ENCOUNTER — OFFICE VISIT (OUTPATIENT)
Dept: ORTHOPEDICS | Facility: CLINIC | Age: 56
End: 2021-04-01
Payer: MEDICARE

## 2021-04-01 ENCOUNTER — HOSPITAL ENCOUNTER (OUTPATIENT)
Dept: RADIOLOGY | Facility: HOSPITAL | Age: 56
Discharge: HOME | End: 2021-04-01
Attending: PHYSICIAN ASSISTANT
Payer: MEDICARE

## 2021-04-01 VITALS — BODY MASS INDEX: 50.02 KG/M2 | WEIGHT: 293 LBS | HEIGHT: 64 IN

## 2021-04-01 DIAGNOSIS — M79.672 LEFT FOOT PAIN: ICD-10-CM

## 2021-04-01 DIAGNOSIS — M76.62 ACHILLES TENDINITIS OF LEFT LOWER EXTREMITY: ICD-10-CM

## 2021-04-01 DIAGNOSIS — M79.672 PAIN OF LEFT HEEL: Primary | ICD-10-CM

## 2021-04-01 PROCEDURE — 99213 PR OFFICE/OUTPT VISIT, EST, LEVL III, 20-29 MIN: ICD-10-PCS | Mod: S$PBB,,, | Performed by: PHYSICIAN ASSISTANT

## 2021-04-01 PROCEDURE — 73630 X-RAY EXAM OF FOOT: CPT | Mod: 26,LT,, | Performed by: RADIOLOGY

## 2021-04-01 PROCEDURE — 99213 OFFICE O/P EST LOW 20 MIN: CPT | Mod: S$PBB,,, | Performed by: PHYSICIAN ASSISTANT

## 2021-04-01 PROCEDURE — 99214 OFFICE O/P EST MOD 30 MIN: CPT | Mod: PBBFAC,25 | Performed by: PHYSICIAN ASSISTANT

## 2021-04-01 PROCEDURE — 73630 X-RAY EXAM OF FOOT: CPT | Mod: TC,LT

## 2021-04-01 PROCEDURE — 99999 PR PBB SHADOW E&M-EST. PATIENT-LVL IV: ICD-10-PCS | Mod: PBBFAC,,, | Performed by: PHYSICIAN ASSISTANT

## 2021-04-01 PROCEDURE — 99999 PR PBB SHADOW E&M-EST. PATIENT-LVL IV: CPT | Mod: PBBFAC,,, | Performed by: PHYSICIAN ASSISTANT

## 2021-04-01 PROCEDURE — 73630 XR FOOT COMPLETE 3 VIEW LEFT: ICD-10-PCS | Mod: 26,LT,, | Performed by: RADIOLOGY

## 2021-04-05 ENCOUNTER — DOCUMENTATION ONLY (OUTPATIENT)
Dept: NEUROLOGY | Facility: HOSPITAL | Age: 56
End: 2021-04-05

## 2021-04-06 ENCOUNTER — TELEPHONE (OUTPATIENT)
Dept: NEUROLOGY | Facility: CLINIC | Age: 56
End: 2021-04-06

## 2021-04-06 NOTE — TELEPHONE ENCOUNTER
----- Message from Joshua Morrison sent at 4/6/2021  4:32 PM CDT -----  Contact: @ [phone]  Patient requesting a return call from Jacquie about Botox, pls call

## 2021-04-12 ENCOUNTER — OFFICE VISIT (OUTPATIENT)
Dept: INTERNAL MEDICINE | Facility: CLINIC | Age: 56
End: 2021-04-12
Payer: MEDICARE

## 2021-04-12 ENCOUNTER — PATIENT OUTREACH (OUTPATIENT)
Dept: ADMINISTRATIVE | Facility: OTHER | Age: 56
End: 2021-04-12

## 2021-04-12 ENCOUNTER — TELEPHONE (OUTPATIENT)
Dept: NEUROLOGY | Facility: CLINIC | Age: 56
End: 2021-04-12

## 2021-04-12 VITALS
WEIGHT: 293 LBS | DIASTOLIC BLOOD PRESSURE: 80 MMHG | SYSTOLIC BLOOD PRESSURE: 132 MMHG | HEART RATE: 68 BPM | BODY MASS INDEX: 50.02 KG/M2 | HEIGHT: 64 IN | OXYGEN SATURATION: 99 %

## 2021-04-12 DIAGNOSIS — E55.9 VITAMIN D DEFICIENCY: ICD-10-CM

## 2021-04-12 DIAGNOSIS — I10 ESSENTIAL HYPERTENSION: ICD-10-CM

## 2021-04-12 DIAGNOSIS — F41.9 ANXIETY: ICD-10-CM

## 2021-04-12 DIAGNOSIS — G43.711 CHRONIC MIGRAINE WITHOUT AURA, WITH INTRACTABLE MIGRAINE, SO STATED, WITH STATUS MIGRAINOSUS: ICD-10-CM

## 2021-04-12 DIAGNOSIS — I48.0 PAROXYSMAL ATRIAL FIBRILLATION: ICD-10-CM

## 2021-04-12 DIAGNOSIS — Z86.73 HISTORY OF TIA (TRANSIENT ISCHEMIC ATTACK): ICD-10-CM

## 2021-04-12 DIAGNOSIS — F06.70 MILD NEUROCOGNITIVE DISORDER DUE TO ANOTHER MEDICAL CONDITION: ICD-10-CM

## 2021-04-12 DIAGNOSIS — Z86.73 HISTORY OF CVA (CEREBROVASCULAR ACCIDENT): ICD-10-CM

## 2021-04-12 DIAGNOSIS — E66.01 CLASS 3 SEVERE OBESITY DUE TO EXCESS CALORIES WITH SERIOUS COMORBIDITY AND BODY MASS INDEX (BMI) OF 50.0 TO 59.9 IN ADULT: ICD-10-CM

## 2021-04-12 DIAGNOSIS — R73.03 PREDIABETES: Primary | ICD-10-CM

## 2021-04-12 DIAGNOSIS — D51.9 ANEMIA DUE TO VITAMIN B12 DEFICIENCY, UNSPECIFIED B12 DEFICIENCY TYPE: ICD-10-CM

## 2021-04-12 DIAGNOSIS — E78.2 MIXED HYPERLIPIDEMIA: ICD-10-CM

## 2021-04-12 DIAGNOSIS — F33.1 MODERATE EPISODE OF RECURRENT MAJOR DEPRESSIVE DISORDER: ICD-10-CM

## 2021-04-12 PROCEDURE — 99999 PR PBB SHADOW E&M-EST. PATIENT-LVL V: ICD-10-PCS | Mod: PBBFAC,,, | Performed by: INTERNAL MEDICINE

## 2021-04-12 PROCEDURE — 99214 OFFICE O/P EST MOD 30 MIN: CPT | Mod: S$PBB,,, | Performed by: INTERNAL MEDICINE

## 2021-04-12 PROCEDURE — 99215 OFFICE O/P EST HI 40 MIN: CPT | Mod: PBBFAC | Performed by: INTERNAL MEDICINE

## 2021-04-12 PROCEDURE — 99214 PR OFFICE/OUTPT VISIT, EST, LEVL IV, 30-39 MIN: ICD-10-PCS | Mod: S$PBB,,, | Performed by: INTERNAL MEDICINE

## 2021-04-12 PROCEDURE — 99999 PR PBB SHADOW E&M-EST. PATIENT-LVL V: CPT | Mod: PBBFAC,,, | Performed by: INTERNAL MEDICINE

## 2021-04-12 RX ORDER — ERGOCALCIFEROL 1.25 MG/1
50000 CAPSULE ORAL
Qty: 24 CAPSULE | Refills: 3 | Status: SHIPPED | OUTPATIENT
Start: 2021-04-12 | End: 2021-10-05 | Stop reason: SDUPTHER

## 2021-04-12 RX ORDER — METFORMIN HYDROCHLORIDE 1000 MG/1
1000 TABLET ORAL 2 TIMES DAILY WITH MEALS
Qty: 180 TABLET | Refills: 3 | Status: SHIPPED | OUTPATIENT
Start: 2021-04-12 | End: 2022-04-19 | Stop reason: SDUPTHER

## 2021-04-13 DIAGNOSIS — I10 ESSENTIAL HYPERTENSION: ICD-10-CM

## 2021-04-13 RX ORDER — LOSARTAN POTASSIUM 100 MG/1
100 TABLET ORAL DAILY
Qty: 90 TABLET | Refills: 3 | Status: SHIPPED | OUTPATIENT
Start: 2021-04-13 | End: 2022-04-13 | Stop reason: SDUPTHER

## 2021-04-15 ENCOUNTER — TELEPHONE (OUTPATIENT)
Dept: CARDIOLOGY | Facility: HOSPITAL | Age: 56
End: 2021-04-15

## 2021-04-19 ENCOUNTER — TELEPHONE (OUTPATIENT)
Dept: NEUROLOGY | Facility: CLINIC | Age: 56
End: 2021-04-19

## 2021-04-21 ENCOUNTER — DOCUMENTATION ONLY (OUTPATIENT)
Dept: BARIATRICS | Facility: CLINIC | Age: 56
End: 2021-04-21

## 2021-04-22 ENCOUNTER — PROCEDURE VISIT (OUTPATIENT)
Dept: NEUROLOGY | Facility: CLINIC | Age: 56
End: 2021-04-22
Payer: MEDICARE

## 2021-04-22 VITALS
HEART RATE: 99 BPM | DIASTOLIC BLOOD PRESSURE: 70 MMHG | WEIGHT: 293 LBS | SYSTOLIC BLOOD PRESSURE: 152 MMHG | BODY MASS INDEX: 53.04 KG/M2

## 2021-04-22 DIAGNOSIS — G43.711 CHRONIC MIGRAINE WITHOUT AURA, WITH INTRACTABLE MIGRAINE, SO STATED, WITH STATUS MIGRAINOSUS: Primary | ICD-10-CM

## 2021-04-22 PROCEDURE — 64615 CHEMODENERV MUSC MIGRAINE: CPT | Mod: PBBFAC | Performed by: PSYCHIATRY & NEUROLOGY

## 2021-04-22 PROCEDURE — 99499 NO LOS: ICD-10-PCS | Mod: S$PBB,,, | Performed by: PSYCHIATRY & NEUROLOGY

## 2021-04-22 PROCEDURE — 99499 UNLISTED E&M SERVICE: CPT | Mod: S$PBB,,, | Performed by: PSYCHIATRY & NEUROLOGY

## 2021-04-22 PROCEDURE — 64615 PR CHEMODENERVATION OF MUSCLE FOR CHRONIC MIGRAINE: ICD-10-PCS | Mod: S$PBB,,, | Performed by: PSYCHIATRY & NEUROLOGY

## 2021-04-22 PROCEDURE — 64615 CHEMODENERV MUSC MIGRAINE: CPT | Mod: S$PBB,,, | Performed by: PSYCHIATRY & NEUROLOGY

## 2021-04-22 RX ADMIN — ONABOTULINUMTOXINA 200 UNITS: 100 INJECTION, POWDER, LYOPHILIZED, FOR SOLUTION INTRADERMAL; INTRAMUSCULAR at 11:04

## 2021-04-26 ENCOUNTER — TELEPHONE (OUTPATIENT)
Dept: NEUROLOGY | Facility: CLINIC | Age: 56
End: 2021-04-26

## 2021-04-26 NOTE — TELEPHONE ENCOUNTER
----- Message from Allison Perea sent at 4/26/2021  9:45 AM CDT -----  Regarding: Ref to getting an apopt.  Contact: [phone]  Pt is asking for Jacquie.  Pt needs for a Botox appt.  Thanks so much

## 2021-04-27 ENCOUNTER — TELEPHONE (OUTPATIENT)
Dept: CARDIOLOGY | Facility: HOSPITAL | Age: 56
End: 2021-04-27

## 2021-04-27 ENCOUNTER — TELEPHONE (OUTPATIENT)
Dept: BARIATRICS | Facility: CLINIC | Age: 56
End: 2021-04-27

## 2021-04-27 PROBLEM — R07.9 LEFT-SIDED CHEST PAIN: Status: RESOLVED | Noted: 2020-09-14 | Resolved: 2021-04-27

## 2021-04-27 RX ORDER — MAGNESIUM 200 MG
1000 TABLET ORAL DAILY
Qty: 90 TABLET | Refills: 3 | Status: SHIPPED | OUTPATIENT
Start: 2021-04-27 | End: 2023-09-20 | Stop reason: SDUPTHER

## 2021-04-30 ENCOUNTER — EXTERNAL CHRONIC CARE MANAGEMENT (OUTPATIENT)
Dept: PRIMARY CARE CLINIC | Facility: CLINIC | Age: 56
End: 2021-04-30
Payer: MEDICARE

## 2021-04-30 PROCEDURE — 99487 CPLX CHRNC CARE 1ST 60 MIN: CPT | Mod: S$PBB,,, | Performed by: INTERNAL MEDICINE

## 2021-04-30 PROCEDURE — 99489 PR COMPLX CHRON CARE MGMT, EA ADDTL 30 MIN, PER MONTH: ICD-10-PCS | Mod: S$PBB,,, | Performed by: INTERNAL MEDICINE

## 2021-04-30 PROCEDURE — 99487 CPLX CHRNC CARE 1ST 60 MIN: CPT | Mod: PBBFAC | Performed by: INTERNAL MEDICINE

## 2021-04-30 PROCEDURE — 99489 CPLX CHRNC CARE EA ADDL 30: CPT | Mod: S$PBB,,, | Performed by: INTERNAL MEDICINE

## 2021-04-30 PROCEDURE — 99487 PR COMPLX CHRON CARE MGMT, 1ST HR, PER MONTH: ICD-10-PCS | Mod: S$PBB,,, | Performed by: INTERNAL MEDICINE

## 2021-04-30 PROCEDURE — 99489 CPLX CHRNC CARE EA ADDL 30: CPT | Mod: PBBFAC | Performed by: INTERNAL MEDICINE

## 2021-05-06 ENCOUNTER — HOSPITAL ENCOUNTER (OUTPATIENT)
Dept: CARDIOLOGY | Facility: CLINIC | Age: 56
Discharge: HOME | End: 2021-05-06
Payer: MEDICARE

## 2021-05-06 ENCOUNTER — OFFICE VISIT (OUTPATIENT)
Dept: ELECTROPHYSIOLOGY | Facility: CLINIC | Age: 56
End: 2021-05-06
Payer: MEDICARE

## 2021-05-06 ENCOUNTER — CLINICAL SUPPORT (OUTPATIENT)
Dept: CARDIOLOGY | Facility: HOSPITAL | Age: 56
End: 2021-05-06
Attending: INTERNAL MEDICINE
Payer: MEDICARE

## 2021-05-06 VITALS
BODY MASS INDEX: 50.02 KG/M2 | HEART RATE: 65 BPM | SYSTOLIC BLOOD PRESSURE: 171 MMHG | HEIGHT: 64 IN | WEIGHT: 293 LBS | DIASTOLIC BLOOD PRESSURE: 76 MMHG

## 2021-05-06 DIAGNOSIS — I49.8 OTHER SPECIFIED CARDIAC ARRHYTHMIAS: ICD-10-CM

## 2021-05-06 DIAGNOSIS — I44.2 COMPLETE AV BLOCK: Primary | ICD-10-CM

## 2021-05-06 DIAGNOSIS — I10 ESSENTIAL HYPERTENSION: ICD-10-CM

## 2021-05-06 DIAGNOSIS — T82.110D PACEMAKER LEAD MALFUNCTION, SUBSEQUENT ENCOUNTER: ICD-10-CM

## 2021-05-06 DIAGNOSIS — G47.33 OBSTRUCTIVE SLEEP APNEA: ICD-10-CM

## 2021-05-06 DIAGNOSIS — I42.8 NONISCHEMIC CARDIOMYOPATHY: ICD-10-CM

## 2021-05-06 DIAGNOSIS — Z95.810 BIVENTRICULAR AUTOMATIC IMPLANTABLE CARDIOVERTER DEFIBRILLATOR IN SITU: ICD-10-CM

## 2021-05-06 DIAGNOSIS — I48.0 PAROXYSMAL ATRIAL FIBRILLATION: ICD-10-CM

## 2021-05-06 PROCEDURE — 99214 OFFICE O/P EST MOD 30 MIN: CPT | Mod: S$PBB,,, | Performed by: INTERNAL MEDICINE

## 2021-05-06 PROCEDURE — 93010 ELECTROCARDIOGRAM REPORT: CPT | Mod: S$PBB,,, | Performed by: INTERNAL MEDICINE

## 2021-05-06 PROCEDURE — 99999 PR PBB SHADOW E&M-EST. PATIENT-LVL III: ICD-10-PCS | Mod: PBBFAC,,, | Performed by: INTERNAL MEDICINE

## 2021-05-06 PROCEDURE — 93010 RHYTHM STRIP: ICD-10-PCS | Mod: S$PBB,,, | Performed by: INTERNAL MEDICINE

## 2021-05-06 PROCEDURE — 93284 PRGRMG EVAL IMPLANTABLE DFB: CPT

## 2021-05-06 PROCEDURE — 93005 ELECTROCARDIOGRAM TRACING: CPT | Mod: PBBFAC,59 | Performed by: INTERNAL MEDICINE

## 2021-05-06 PROCEDURE — 93284 CARDIAC DEVICE CHECK - IN CLINIC & HOSPITAL: ICD-10-PCS | Mod: 26,,, | Performed by: INTERNAL MEDICINE

## 2021-05-06 PROCEDURE — 99214 PR OFFICE/OUTPT VISIT, EST, LEVL IV, 30-39 MIN: ICD-10-PCS | Mod: S$PBB,,, | Performed by: INTERNAL MEDICINE

## 2021-05-06 PROCEDURE — 99213 OFFICE O/P EST LOW 20 MIN: CPT | Mod: PBBFAC,25 | Performed by: INTERNAL MEDICINE

## 2021-05-06 PROCEDURE — 99999 PR PBB SHADOW E&M-EST. PATIENT-LVL III: CPT | Mod: PBBFAC,,, | Performed by: INTERNAL MEDICINE

## 2021-05-06 PROCEDURE — 93284 PRGRMG EVAL IMPLANTABLE DFB: CPT | Mod: 26,,, | Performed by: INTERNAL MEDICINE

## 2021-05-10 ENCOUNTER — OFFICE VISIT (OUTPATIENT)
Dept: PSYCHIATRY | Facility: CLINIC | Age: 56
End: 2021-05-10
Payer: MEDICARE

## 2021-05-10 DIAGNOSIS — F33.1 DEPRESSION, MAJOR, RECURRENT, MODERATE: Primary | ICD-10-CM

## 2021-05-10 DIAGNOSIS — F41.9 ANXIETY: ICD-10-CM

## 2021-05-10 DIAGNOSIS — F06.70 MILD NEUROCOGNITIVE DISORDER DUE TO ANOTHER MEDICAL CONDITION: ICD-10-CM

## 2021-05-10 DIAGNOSIS — G47.00 INSOMNIA, UNSPECIFIED TYPE: ICD-10-CM

## 2021-05-10 PROCEDURE — 90833 PR PSYCHOTHERAPY W/PATIENT W/E&M, 30 MIN (ADD ON): ICD-10-PCS | Mod: 95,,, | Performed by: NURSE PRACTITIONER

## 2021-05-10 PROCEDURE — 99213 PR OFFICE/OUTPT VISIT, EST, LEVL III, 20-29 MIN: ICD-10-PCS | Mod: 95,,, | Performed by: NURSE PRACTITIONER

## 2021-05-10 PROCEDURE — 99213 OFFICE O/P EST LOW 20 MIN: CPT | Mod: 95,,, | Performed by: NURSE PRACTITIONER

## 2021-05-10 PROCEDURE — 90834 PSYTX W PT 45 MINUTES: CPT | Mod: 95,,, | Performed by: SOCIAL WORKER

## 2021-05-10 PROCEDURE — 90833 PSYTX W PT W E/M 30 MIN: CPT | Mod: 95,,, | Performed by: NURSE PRACTITIONER

## 2021-05-10 PROCEDURE — 90834 PR PSYCHOTHERAPY W/PATIENT, 45 MIN: ICD-10-PCS | Mod: 95,,, | Performed by: SOCIAL WORKER

## 2021-05-10 RX ORDER — SERTRALINE HYDROCHLORIDE 100 MG/1
TABLET, FILM COATED ORAL
Qty: 90 TABLET | Refills: 3 | Status: SHIPPED | OUTPATIENT
Start: 2021-05-10 | End: 2022-04-28 | Stop reason: SDUPTHER

## 2021-05-10 RX ORDER — ARIPIPRAZOLE 15 MG/1
15 TABLET ORAL DAILY
Qty: 90 TABLET | Refills: 1 | Status: SHIPPED | OUTPATIENT
Start: 2021-05-10 | End: 2022-04-28 | Stop reason: SDUPTHER

## 2021-05-10 RX ORDER — ZOLPIDEM TARTRATE 10 MG/1
10 TABLET ORAL NIGHTLY PRN
Qty: 30 TABLET | Refills: 5 | Status: SHIPPED | OUTPATIENT
Start: 2021-05-10 | End: 2021-11-24 | Stop reason: SDUPTHER

## 2021-05-10 RX ORDER — CLONAZEPAM 1 MG/1
1 TABLET ORAL DAILY PRN
Qty: 20 TABLET | Refills: 3 | Status: SHIPPED | OUTPATIENT
Start: 2021-05-10 | End: 2021-12-14 | Stop reason: SDUPTHER

## 2021-05-13 ENCOUNTER — PATIENT MESSAGE (OUTPATIENT)
Dept: NEUROLOGY | Facility: CLINIC | Age: 56
End: 2021-05-13

## 2021-05-13 DIAGNOSIS — G43.711 CHRONIC MIGRAINE WITHOUT AURA, WITH INTRACTABLE MIGRAINE, SO STATED, WITH STATUS MIGRAINOSUS: Primary | ICD-10-CM

## 2021-05-21 ENCOUNTER — TELEPHONE (OUTPATIENT)
Dept: NEUROLOGY | Facility: CLINIC | Age: 56
End: 2021-05-21

## 2021-05-27 ENCOUNTER — PES CALL (OUTPATIENT)
Dept: ADMINISTRATIVE | Facility: CLINIC | Age: 56
End: 2021-05-27

## 2021-05-28 ENCOUNTER — PATIENT OUTREACH (OUTPATIENT)
Dept: ADMINISTRATIVE | Facility: OTHER | Age: 56
End: 2021-05-28

## 2021-05-28 DIAGNOSIS — Z12.31 BREAST CANCER SCREENING BY MAMMOGRAM: Primary | ICD-10-CM

## 2021-05-31 ENCOUNTER — EXTERNAL CHRONIC CARE MANAGEMENT (OUTPATIENT)
Dept: PRIMARY CARE CLINIC | Facility: CLINIC | Age: 56
End: 2021-05-31
Payer: MEDICARE

## 2021-05-31 PROCEDURE — 99490 CHRNC CARE MGMT STAFF 1ST 20: CPT | Mod: S$PBB,,, | Performed by: INTERNAL MEDICINE

## 2021-05-31 PROCEDURE — 99490 PR CHRONIC CARE MGMT, 1ST 20 MIN: ICD-10-PCS | Mod: S$PBB,,, | Performed by: INTERNAL MEDICINE

## 2021-05-31 PROCEDURE — 99490 CHRNC CARE MGMT STAFF 1ST 20: CPT | Mod: PBBFAC | Performed by: INTERNAL MEDICINE

## 2021-06-02 ENCOUNTER — TELEPHONE (OUTPATIENT)
Dept: ADMINISTRATIVE | Facility: CLINIC | Age: 56
End: 2021-06-02

## 2021-06-03 ENCOUNTER — OFFICE VISIT (OUTPATIENT)
Dept: INTERNAL MEDICINE | Facility: CLINIC | Age: 56
End: 2021-06-03
Payer: MEDICARE

## 2021-06-03 ENCOUNTER — TELEPHONE (OUTPATIENT)
Dept: ADMINISTRATIVE | Facility: CLINIC | Age: 56
End: 2021-06-03

## 2021-06-03 DIAGNOSIS — G43.711 CHRONIC MIGRAINE WITHOUT AURA, WITH INTRACTABLE MIGRAINE, SO STATED, WITH STATUS MIGRAINOSUS: ICD-10-CM

## 2021-06-03 DIAGNOSIS — Z78.9 IMPAIRED MOBILITY AND ADLS: Chronic | ICD-10-CM

## 2021-06-03 DIAGNOSIS — E78.2 MIXED HYPERLIPIDEMIA: ICD-10-CM

## 2021-06-03 DIAGNOSIS — I10 ESSENTIAL HYPERTENSION: ICD-10-CM

## 2021-06-03 DIAGNOSIS — I42.8 NONISCHEMIC CARDIOMYOPATHY: ICD-10-CM

## 2021-06-03 DIAGNOSIS — M24.28 LIGAMENTUM FLAVUM HYPERTROPHY: ICD-10-CM

## 2021-06-03 DIAGNOSIS — S06.9X0S COGNITIVE DEFICIT AS LATE EFFECT OF TRAUMATIC BRAIN INJURY: ICD-10-CM

## 2021-06-03 DIAGNOSIS — Z95.810 BIVENTRICULAR AUTOMATIC IMPLANTABLE CARDIOVERTER DEFIBRILLATOR IN SITU: ICD-10-CM

## 2021-06-03 DIAGNOSIS — I48.0 PAROXYSMAL ATRIAL FIBRILLATION: ICD-10-CM

## 2021-06-03 DIAGNOSIS — F06.8 COGNITIVE DEFICIT AS LATE EFFECT OF TRAUMATIC BRAIN INJURY: ICD-10-CM

## 2021-06-03 DIAGNOSIS — Z86.73 HISTORY OF TIA (TRANSIENT ISCHEMIC ATTACK): ICD-10-CM

## 2021-06-03 DIAGNOSIS — Z00.00 ENCOUNTER FOR PREVENTATIVE ADULT HEALTH CARE EXAMINATION: Primary | ICD-10-CM

## 2021-06-03 DIAGNOSIS — F06.70 MILD NEUROCOGNITIVE DISORDER DUE TO ANOTHER MEDICAL CONDITION: ICD-10-CM

## 2021-06-03 DIAGNOSIS — F33.1 MODERATE EPISODE OF RECURRENT MAJOR DEPRESSIVE DISORDER: ICD-10-CM

## 2021-06-03 DIAGNOSIS — G81.92 HEMIPLEGIA AFFECTING LEFT DOMINANT SIDE, UNSPECIFIED ETIOLOGY, UNSPECIFIED HEMIPLEGIA TYPE: ICD-10-CM

## 2021-06-03 DIAGNOSIS — Z86.73 HISTORY OF CVA (CEREBROVASCULAR ACCIDENT): ICD-10-CM

## 2021-06-03 DIAGNOSIS — Z86.74 HISTORY OF SUDDEN CARDIAC ARREST: ICD-10-CM

## 2021-06-03 DIAGNOSIS — Z79.01 LONG TERM (CURRENT) USE OF ANTICOAGULANTS: ICD-10-CM

## 2021-06-03 DIAGNOSIS — Z74.09 IMPAIRED MOBILITY AND ADLS: Chronic | ICD-10-CM

## 2021-06-03 DIAGNOSIS — G50.0 SUPRAORBITAL NEURALGIA: ICD-10-CM

## 2021-06-03 PROCEDURE — G0439 PPPS, SUBSEQ VISIT: HCPCS | Mod: 95,,, | Performed by: NURSE PRACTITIONER

## 2021-06-03 PROCEDURE — G0439 PR MEDICARE ANNUAL WELLNESS SUBSEQUENT VISIT: ICD-10-PCS | Mod: 95,,, | Performed by: NURSE PRACTITIONER

## 2021-06-22 DIAGNOSIS — E78.2 MIXED HYPERLIPIDEMIA: ICD-10-CM

## 2021-06-22 DIAGNOSIS — G43.711 CHRONIC MIGRAINE WITHOUT AURA, WITH INTRACTABLE MIGRAINE, SO STATED, WITH STATUS MIGRAINOSUS: ICD-10-CM

## 2021-06-22 DIAGNOSIS — I48.0 PAROXYSMAL ATRIAL FIBRILLATION: ICD-10-CM

## 2021-06-22 DIAGNOSIS — I42.8 NONISCHEMIC CARDIOMYOPATHY: ICD-10-CM

## 2021-06-22 DIAGNOSIS — I10 ESSENTIAL HYPERTENSION: ICD-10-CM

## 2021-06-22 DIAGNOSIS — G47.8 UPPER AIRWAY RESISTANCE SYNDROME: ICD-10-CM

## 2021-06-22 RX ORDER — CARVEDILOL 25 MG/1
25 TABLET ORAL 2 TIMES DAILY WITH MEALS
Qty: 60 TABLET | Refills: 3 | Status: CANCELLED | OUTPATIENT
Start: 2021-06-22

## 2021-06-22 RX ORDER — ONDANSETRON 4 MG/1
4 TABLET, ORALLY DISINTEGRATING ORAL EVERY 12 HOURS PRN
Qty: 40 TABLET | Refills: 12 | Status: CANCELLED | OUTPATIENT
Start: 2021-06-22

## 2021-06-23 RX ORDER — ONDANSETRON 4 MG/1
4 TABLET, ORALLY DISINTEGRATING ORAL EVERY 12 HOURS PRN
Qty: 40 TABLET | Refills: 12 | Status: SHIPPED | OUTPATIENT
Start: 2021-06-23 | End: 2022-07-19 | Stop reason: SDUPTHER

## 2021-06-23 RX ORDER — CARVEDILOL 25 MG/1
25 TABLET ORAL 2 TIMES DAILY WITH MEALS
Qty: 60 TABLET | Refills: 3 | Status: SHIPPED | OUTPATIENT
Start: 2021-06-23 | End: 2021-07-12

## 2021-06-24 ENCOUNTER — OFFICE VISIT (OUTPATIENT)
Dept: PSYCHIATRY | Facility: CLINIC | Age: 56
End: 2021-06-24
Payer: MEDICARE

## 2021-06-24 DIAGNOSIS — F33.1 DEPRESSION, MAJOR, RECURRENT, MODERATE: Primary | ICD-10-CM

## 2021-06-24 PROCEDURE — 90834 PR PSYCHOTHERAPY W/PATIENT, 45 MIN: ICD-10-PCS | Mod: 95,,, | Performed by: SOCIAL WORKER

## 2021-06-24 PROCEDURE — 90834 PSYTX W PT 45 MINUTES: CPT | Mod: 95,,, | Performed by: SOCIAL WORKER

## 2021-06-26 ENCOUNTER — CLINICAL SUPPORT (OUTPATIENT)
Dept: CARDIOLOGY | Facility: HOSPITAL | Age: 56
End: 2021-06-26
Payer: MEDICARE

## 2021-06-28 ENCOUNTER — PATIENT OUTREACH (OUTPATIENT)
Dept: ADMINISTRATIVE | Facility: OTHER | Age: 56
End: 2021-06-28

## 2021-06-29 ENCOUNTER — OFFICE VISIT (OUTPATIENT)
Dept: CARDIOLOGY | Facility: CLINIC | Age: 56
End: 2021-06-29
Payer: MEDICARE

## 2021-06-29 VITALS
HEIGHT: 64 IN | SYSTOLIC BLOOD PRESSURE: 147 MMHG | BODY MASS INDEX: 50.02 KG/M2 | HEART RATE: 64 BPM | WEIGHT: 293 LBS | DIASTOLIC BLOOD PRESSURE: 74 MMHG

## 2021-06-29 DIAGNOSIS — Z79.01 LONG TERM (CURRENT) USE OF ANTICOAGULANTS: ICD-10-CM

## 2021-06-29 DIAGNOSIS — Z86.73 HISTORY OF TIA (TRANSIENT ISCHEMIC ATTACK): ICD-10-CM

## 2021-06-29 DIAGNOSIS — I48.0 PAROXYSMAL ATRIAL FIBRILLATION: ICD-10-CM

## 2021-06-29 DIAGNOSIS — G93.1 HYPOXIC BRAIN INJURY: ICD-10-CM

## 2021-06-29 DIAGNOSIS — Z95.0 PACEMAKER: ICD-10-CM

## 2021-06-29 DIAGNOSIS — I10 ESSENTIAL HYPERTENSION: ICD-10-CM

## 2021-06-29 DIAGNOSIS — I46.9 SUDDEN CARDIAC ARREST: Primary | ICD-10-CM

## 2021-06-29 DIAGNOSIS — I44.2 COMPLETE AV BLOCK: ICD-10-CM

## 2021-06-29 DIAGNOSIS — E78.2 MIXED HYPERLIPIDEMIA: ICD-10-CM

## 2021-06-29 DIAGNOSIS — G47.33 OBSTRUCTIVE SLEEP APNEA: ICD-10-CM

## 2021-06-29 PROCEDURE — 99214 PR OFFICE/OUTPT VISIT, EST, LEVL IV, 30-39 MIN: ICD-10-PCS | Mod: S$PBB,,, | Performed by: INTERNAL MEDICINE

## 2021-06-29 PROCEDURE — 99999 PR PBB SHADOW E&M-EST. PATIENT-LVL V: ICD-10-PCS | Mod: PBBFAC,,, | Performed by: INTERNAL MEDICINE

## 2021-06-29 PROCEDURE — 99214 OFFICE O/P EST MOD 30 MIN: CPT | Mod: S$PBB,,, | Performed by: INTERNAL MEDICINE

## 2021-06-29 PROCEDURE — 99215 OFFICE O/P EST HI 40 MIN: CPT | Mod: PBBFAC | Performed by: INTERNAL MEDICINE

## 2021-06-29 PROCEDURE — 99999 PR PBB SHADOW E&M-EST. PATIENT-LVL V: CPT | Mod: PBBFAC,,, | Performed by: INTERNAL MEDICINE

## 2021-06-30 ENCOUNTER — PATIENT MESSAGE (OUTPATIENT)
Dept: NEUROLOGY | Facility: CLINIC | Age: 56
End: 2021-06-30

## 2021-06-30 ENCOUNTER — EXTERNAL CHRONIC CARE MANAGEMENT (OUTPATIENT)
Dept: PRIMARY CARE CLINIC | Facility: CLINIC | Age: 56
End: 2021-06-30
Payer: MEDICARE

## 2021-06-30 PROCEDURE — 99490 CHRNC CARE MGMT STAFF 1ST 20: CPT | Mod: S$PBB,,, | Performed by: INTERNAL MEDICINE

## 2021-06-30 PROCEDURE — 99490 PR CHRONIC CARE MGMT, 1ST 20 MIN: ICD-10-PCS | Mod: S$PBB,,, | Performed by: INTERNAL MEDICINE

## 2021-06-30 PROCEDURE — 99490 CHRNC CARE MGMT STAFF 1ST 20: CPT | Mod: PBBFAC | Performed by: INTERNAL MEDICINE

## 2021-07-06 ENCOUNTER — PATIENT MESSAGE (OUTPATIENT)
Dept: ELECTROPHYSIOLOGY | Facility: CLINIC | Age: 56
End: 2021-07-06

## 2021-07-06 ENCOUNTER — PATIENT MESSAGE (OUTPATIENT)
Dept: INTERNAL MEDICINE | Facility: CLINIC | Age: 56
End: 2021-07-06

## 2021-07-06 ENCOUNTER — PROCEDURE VISIT (OUTPATIENT)
Dept: NEUROLOGY | Facility: CLINIC | Age: 56
End: 2021-07-06
Payer: MEDICARE

## 2021-07-06 ENCOUNTER — TELEPHONE (OUTPATIENT)
Dept: ELECTROPHYSIOLOGY | Facility: CLINIC | Age: 56
End: 2021-07-06

## 2021-07-06 VITALS
WEIGHT: 293 LBS | SYSTOLIC BLOOD PRESSURE: 129 MMHG | DIASTOLIC BLOOD PRESSURE: 76 MMHG | HEART RATE: 79 BPM | BODY MASS INDEX: 50.02 KG/M2 | HEIGHT: 64 IN

## 2021-07-06 DIAGNOSIS — G43.711 CHRONIC MIGRAINE WITHOUT AURA, WITH INTRACTABLE MIGRAINE, SO STATED, WITH STATUS MIGRAINOSUS: Primary | ICD-10-CM

## 2021-07-06 PROCEDURE — 99499 NO LOS: ICD-10-PCS | Mod: S$PBB,,, | Performed by: PSYCHIATRY & NEUROLOGY

## 2021-07-06 PROCEDURE — 64615 PR CHEMODENERVATION OF MUSCLE FOR CHRONIC MIGRAINE: ICD-10-PCS | Mod: S$PBB,,, | Performed by: PSYCHIATRY & NEUROLOGY

## 2021-07-06 PROCEDURE — 64615 CHEMODENERV MUSC MIGRAINE: CPT | Mod: PBBFAC | Performed by: PSYCHIATRY & NEUROLOGY

## 2021-07-06 PROCEDURE — 64615 CHEMODENERV MUSC MIGRAINE: CPT | Mod: S$PBB,,, | Performed by: PSYCHIATRY & NEUROLOGY

## 2021-07-06 PROCEDURE — 99499 UNLISTED E&M SERVICE: CPT | Mod: S$PBB,,, | Performed by: PSYCHIATRY & NEUROLOGY

## 2021-07-06 RX ADMIN — ONABOTULINUMTOXINA 200 UNITS: 100 INJECTION, POWDER, LYOPHILIZED, FOR SOLUTION INTRADERMAL; INTRAMUSCULAR at 10:07

## 2021-07-08 DIAGNOSIS — G43.711 CHRONIC MIGRAINE WITHOUT AURA, WITH INTRACTABLE MIGRAINE, SO STATED, WITH STATUS MIGRAINOSUS: Primary | ICD-10-CM

## 2021-07-13 ENCOUNTER — PATIENT MESSAGE (OUTPATIENT)
Dept: INTERNAL MEDICINE | Facility: CLINIC | Age: 56
End: 2021-07-13

## 2021-07-13 ENCOUNTER — PATIENT MESSAGE (OUTPATIENT)
Dept: ELECTROPHYSIOLOGY | Facility: CLINIC | Age: 56
End: 2021-07-13

## 2021-07-13 ENCOUNTER — PATIENT MESSAGE (OUTPATIENT)
Dept: NEUROLOGY | Facility: CLINIC | Age: 56
End: 2021-07-13

## 2021-07-15 ENCOUNTER — OFFICE VISIT (OUTPATIENT)
Dept: OTOLARYNGOLOGY | Facility: CLINIC | Age: 56
End: 2021-07-15
Payer: MEDICARE

## 2021-07-15 VITALS
DIASTOLIC BLOOD PRESSURE: 89 MMHG | SYSTOLIC BLOOD PRESSURE: 154 MMHG | BODY MASS INDEX: 52.15 KG/M2 | WEIGHT: 293 LBS | HEART RATE: 62 BPM

## 2021-07-15 DIAGNOSIS — R04.0 EPISTAXIS: ICD-10-CM

## 2021-07-15 PROCEDURE — 99214 OFFICE O/P EST MOD 30 MIN: CPT | Mod: S$PBB,,, | Performed by: NURSE PRACTITIONER

## 2021-07-15 PROCEDURE — 99214 PR OFFICE/OUTPT VISIT, EST, LEVL IV, 30-39 MIN: ICD-10-PCS | Mod: S$PBB,,, | Performed by: NURSE PRACTITIONER

## 2021-07-15 PROCEDURE — 99999 PR PBB SHADOW E&M-EST. PATIENT-LVL IV: ICD-10-PCS | Mod: PBBFAC,,, | Performed by: NURSE PRACTITIONER

## 2021-07-15 PROCEDURE — 99999 PR PBB SHADOW E&M-EST. PATIENT-LVL IV: CPT | Mod: PBBFAC,,, | Performed by: NURSE PRACTITIONER

## 2021-07-15 PROCEDURE — 99214 OFFICE O/P EST MOD 30 MIN: CPT | Mod: PBBFAC | Performed by: NURSE PRACTITIONER

## 2021-07-23 ENCOUNTER — TELEPHONE (OUTPATIENT)
Dept: SLEEP MEDICINE | Facility: CLINIC | Age: 56
End: 2021-07-23

## 2021-07-31 ENCOUNTER — EXTERNAL CHRONIC CARE MANAGEMENT (OUTPATIENT)
Dept: PRIMARY CARE CLINIC | Facility: CLINIC | Age: 56
End: 2021-07-31
Payer: MEDICARE

## 2021-07-31 PROCEDURE — 99490 PR CHRONIC CARE MGMT, 1ST 20 MIN: ICD-10-PCS | Mod: S$PBB,,, | Performed by: INTERNAL MEDICINE

## 2021-07-31 PROCEDURE — 99490 CHRNC CARE MGMT STAFF 1ST 20: CPT | Mod: S$PBB,,, | Performed by: INTERNAL MEDICINE

## 2021-07-31 PROCEDURE — 99490 CHRNC CARE MGMT STAFF 1ST 20: CPT | Mod: PBBFAC | Performed by: INTERNAL MEDICINE

## 2021-08-12 ENCOUNTER — PATIENT MESSAGE (OUTPATIENT)
Dept: ELECTROPHYSIOLOGY | Facility: CLINIC | Age: 56
End: 2021-08-12

## 2021-08-12 ENCOUNTER — TELEPHONE (OUTPATIENT)
Dept: ELECTROPHYSIOLOGY | Facility: CLINIC | Age: 56
End: 2021-08-12

## 2021-08-16 ENCOUNTER — PATIENT MESSAGE (OUTPATIENT)
Dept: INTERNAL MEDICINE | Facility: CLINIC | Age: 56
End: 2021-08-16

## 2021-08-24 ENCOUNTER — TELEPHONE (OUTPATIENT)
Dept: INTERNAL MEDICINE | Facility: CLINIC | Age: 56
End: 2021-08-24

## 2021-08-31 ENCOUNTER — EXTERNAL CHRONIC CARE MANAGEMENT (OUTPATIENT)
Dept: PRIMARY CARE CLINIC | Facility: CLINIC | Age: 56
End: 2021-08-31
Payer: MEDICARE

## 2021-08-31 PROCEDURE — 99490 CHRNC CARE MGMT STAFF 1ST 20: CPT | Mod: PBBFAC | Performed by: INTERNAL MEDICINE

## 2021-08-31 PROCEDURE — 99490 CHRNC CARE MGMT STAFF 1ST 20: CPT | Mod: S$PBB,,, | Performed by: INTERNAL MEDICINE

## 2021-08-31 PROCEDURE — 99490 PR CHRONIC CARE MGMT, 1ST 20 MIN: ICD-10-PCS | Mod: S$PBB,,, | Performed by: INTERNAL MEDICINE

## 2021-09-04 ENCOUNTER — PATIENT MESSAGE (OUTPATIENT)
Dept: NEUROLOGY | Facility: CLINIC | Age: 56
End: 2021-09-04

## 2021-09-14 ENCOUNTER — TELEPHONE (OUTPATIENT)
Dept: NEUROLOGY | Facility: CLINIC | Age: 56
End: 2021-09-14

## 2021-09-16 ENCOUNTER — PROCEDURE VISIT (OUTPATIENT)
Dept: NEUROLOGY | Facility: CLINIC | Age: 56
End: 2021-09-16
Payer: MEDICARE

## 2021-09-16 ENCOUNTER — PATIENT MESSAGE (OUTPATIENT)
Dept: INTERNAL MEDICINE | Facility: CLINIC | Age: 56
End: 2021-09-16

## 2021-09-16 VITALS — SYSTOLIC BLOOD PRESSURE: 150 MMHG | DIASTOLIC BLOOD PRESSURE: 69 MMHG | HEART RATE: 82 BPM

## 2021-09-16 DIAGNOSIS — G47.33 OBSTRUCTIVE SLEEP APNEA: ICD-10-CM

## 2021-09-16 DIAGNOSIS — G43.711 CHRONIC MIGRAINE WITHOUT AURA, WITH INTRACTABLE MIGRAINE, SO STATED, WITH STATUS MIGRAINOSUS: Primary | ICD-10-CM

## 2021-09-16 PROCEDURE — 64615 CHEMODENERV MUSC MIGRAINE: CPT | Mod: PBBFAC | Performed by: PSYCHIATRY & NEUROLOGY

## 2021-09-16 RX ORDER — MUPIROCIN 20 MG/G
OINTMENT TOPICAL
Qty: 22 G | Refills: 1 | Status: SHIPPED | OUTPATIENT
Start: 2021-09-16 | End: 2023-04-19

## 2021-09-16 RX ADMIN — ONABOTULINUMTOXINA 200 UNITS: 100 INJECTION, POWDER, LYOPHILIZED, FOR SOLUTION INTRADERMAL; INTRAMUSCULAR at 09:09

## 2021-09-30 ENCOUNTER — EXTERNAL CHRONIC CARE MANAGEMENT (OUTPATIENT)
Dept: PRIMARY CARE CLINIC | Facility: CLINIC | Age: 56
End: 2021-09-30
Payer: MEDICARE

## 2021-09-30 PROCEDURE — 99439 CHRNC CARE MGMT STAF EA ADDL: CPT | Mod: PBBFAC | Performed by: INTERNAL MEDICINE

## 2021-09-30 PROCEDURE — 99439 PR CHRONIC CARE MGMT, EA ADDTL 20 MIN: ICD-10-PCS | Mod: S$PBB,,, | Performed by: INTERNAL MEDICINE

## 2021-09-30 PROCEDURE — 99439 CHRNC CARE MGMT STAF EA ADDL: CPT | Mod: S$PBB,,, | Performed by: INTERNAL MEDICINE

## 2021-09-30 PROCEDURE — 99490 CHRNC CARE MGMT STAFF 1ST 20: CPT | Mod: S$PBB,,, | Performed by: INTERNAL MEDICINE

## 2021-09-30 PROCEDURE — 99490 CHRNC CARE MGMT STAFF 1ST 20: CPT | Mod: PBBFAC,27 | Performed by: INTERNAL MEDICINE

## 2021-09-30 PROCEDURE — 99490 PR CHRONIC CARE MGMT, 1ST 20 MIN: ICD-10-PCS | Mod: S$PBB,,, | Performed by: INTERNAL MEDICINE

## 2021-10-04 ENCOUNTER — PATIENT OUTREACH (OUTPATIENT)
Dept: ADMINISTRATIVE | Facility: OTHER | Age: 56
End: 2021-10-04

## 2021-10-05 ENCOUNTER — OFFICE VISIT (OUTPATIENT)
Dept: ORTHOPEDICS | Facility: CLINIC | Age: 56
End: 2021-10-05
Payer: MEDICARE

## 2021-10-05 ENCOUNTER — LAB VISIT (OUTPATIENT)
Dept: LAB | Facility: HOSPITAL | Age: 56
End: 2021-10-05
Attending: INTERNAL MEDICINE
Payer: MEDICARE

## 2021-10-05 ENCOUNTER — PATIENT MESSAGE (OUTPATIENT)
Dept: CARDIOLOGY | Facility: CLINIC | Age: 56
End: 2021-10-05

## 2021-10-05 DIAGNOSIS — R73.03 PREDIABETES: ICD-10-CM

## 2021-10-05 DIAGNOSIS — E55.9 VITAMIN D DEFICIENCY: ICD-10-CM

## 2021-10-05 DIAGNOSIS — E78.2 MIXED HYPERLIPIDEMIA: ICD-10-CM

## 2021-10-05 DIAGNOSIS — M76.62 ACHILLES TENDINITIS OF LEFT LOWER EXTREMITY: Primary | ICD-10-CM

## 2021-10-05 DIAGNOSIS — D51.9 ANEMIA DUE TO VITAMIN B12 DEFICIENCY, UNSPECIFIED B12 DEFICIENCY TYPE: ICD-10-CM

## 2021-10-05 LAB
25(OH)D3+25(OH)D2 SERPL-MCNC: 23 NG/ML (ref 30–96)
ALBUMIN SERPL BCP-MCNC: 3.1 G/DL (ref 3.5–5.2)
ALP SERPL-CCNC: 56 U/L (ref 55–135)
ALT SERPL W/O P-5'-P-CCNC: 17 U/L (ref 10–44)
ANION GAP SERPL CALC-SCNC: 7 MMOL/L (ref 8–16)
AST SERPL-CCNC: 16 U/L (ref 10–40)
BILIRUB SERPL-MCNC: 0.4 MG/DL (ref 0.1–1)
BUN SERPL-MCNC: 9 MG/DL (ref 6–20)
CALCIUM SERPL-MCNC: 8.9 MG/DL (ref 8.7–10.5)
CHLORIDE SERPL-SCNC: 105 MMOL/L (ref 95–110)
CHOLEST SERPL-MCNC: 119 MG/DL (ref 120–199)
CHOLEST/HDLC SERPL: 3.3 {RATIO} (ref 2–5)
CO2 SERPL-SCNC: 23 MMOL/L (ref 23–29)
CREAT SERPL-MCNC: 0.8 MG/DL (ref 0.5–1.4)
EST. GFR  (AFRICAN AMERICAN): >60 ML/MIN/1.73 M^2
EST. GFR  (NON AFRICAN AMERICAN): >60 ML/MIN/1.73 M^2
ESTIMATED AVG GLUCOSE: 131 MG/DL (ref 68–131)
GLUCOSE SERPL-MCNC: 108 MG/DL (ref 70–110)
HBA1C MFR BLD: 6.2 % (ref 4–5.6)
HDLC SERPL-MCNC: 36 MG/DL (ref 40–75)
HDLC SERPL: 30.3 % (ref 20–50)
LDLC SERPL CALC-MCNC: 70.4 MG/DL (ref 63–159)
NONHDLC SERPL-MCNC: 83 MG/DL
POTASSIUM SERPL-SCNC: 4.1 MMOL/L (ref 3.5–5.1)
PROT SERPL-MCNC: 9.6 G/DL (ref 6–8.4)
SODIUM SERPL-SCNC: 135 MMOL/L (ref 136–145)
TRIGL SERPL-MCNC: 63 MG/DL (ref 30–150)
TSH SERPL DL<=0.005 MIU/L-ACNC: 1.41 UIU/ML (ref 0.4–4)
VIT B12 SERPL-MCNC: 325 PG/ML (ref 210–950)

## 2021-10-05 PROCEDURE — 99999 PR PBB SHADOW E&M-EST. PATIENT-LVL III: CPT | Mod: PBBFAC,,, | Performed by: PHYSICIAN ASSISTANT

## 2021-10-05 PROCEDURE — 99213 PR OFFICE/OUTPT VISIT, EST, LEVL III, 20-29 MIN: ICD-10-PCS | Mod: S$PBB,,, | Performed by: PHYSICIAN ASSISTANT

## 2021-10-05 PROCEDURE — 99213 OFFICE O/P EST LOW 20 MIN: CPT | Mod: PBBFAC | Performed by: PHYSICIAN ASSISTANT

## 2021-10-05 PROCEDURE — 84443 ASSAY THYROID STIM HORMONE: CPT | Performed by: INTERNAL MEDICINE

## 2021-10-05 PROCEDURE — 83036 HEMOGLOBIN GLYCOSYLATED A1C: CPT | Performed by: INTERNAL MEDICINE

## 2021-10-05 PROCEDURE — 99999 PR PBB SHADOW E&M-EST. PATIENT-LVL III: ICD-10-PCS | Mod: PBBFAC,,, | Performed by: PHYSICIAN ASSISTANT

## 2021-10-05 PROCEDURE — 80053 COMPREHEN METABOLIC PANEL: CPT | Performed by: INTERNAL MEDICINE

## 2021-10-05 PROCEDURE — 99213 OFFICE O/P EST LOW 20 MIN: CPT | Mod: S$PBB,,, | Performed by: PHYSICIAN ASSISTANT

## 2021-10-05 PROCEDURE — 80061 LIPID PANEL: CPT | Performed by: INTERNAL MEDICINE

## 2021-10-05 PROCEDURE — 82306 VITAMIN D 25 HYDROXY: CPT | Performed by: INTERNAL MEDICINE

## 2021-10-05 PROCEDURE — 82607 VITAMIN B-12: CPT | Performed by: INTERNAL MEDICINE

## 2021-10-05 RX ORDER — ERGOCALCIFEROL 1.25 MG/1
50000 CAPSULE ORAL
Qty: 24 CAPSULE | Refills: 3 | Status: SHIPPED | OUTPATIENT
Start: 2021-10-07 | End: 2022-04-19

## 2021-10-14 ENCOUNTER — CLINICAL SUPPORT (OUTPATIENT)
Dept: CARDIOLOGY | Facility: HOSPITAL | Age: 56
End: 2021-10-14
Attending: INTERNAL MEDICINE
Payer: MEDICARE

## 2021-10-19 ENCOUNTER — IMMUNIZATION (OUTPATIENT)
Dept: INTERNAL MEDICINE | Facility: CLINIC | Age: 56
End: 2021-10-19
Payer: MEDICARE

## 2021-10-19 ENCOUNTER — OFFICE VISIT (OUTPATIENT)
Dept: INTERNAL MEDICINE | Facility: CLINIC | Age: 56
End: 2021-10-19
Payer: MEDICARE

## 2021-10-19 VITALS
DIASTOLIC BLOOD PRESSURE: 60 MMHG | BODY MASS INDEX: 50.02 KG/M2 | WEIGHT: 293 LBS | OXYGEN SATURATION: 99 % | HEIGHT: 64 IN | HEART RATE: 97 BPM | SYSTOLIC BLOOD PRESSURE: 108 MMHG

## 2021-10-19 DIAGNOSIS — G43.711 CHRONIC MIGRAINE WITHOUT AURA, WITH INTRACTABLE MIGRAINE, SO STATED, WITH STATUS MIGRAINOSUS: ICD-10-CM

## 2021-10-19 DIAGNOSIS — E55.9 VITAMIN D DEFICIENCY: ICD-10-CM

## 2021-10-19 DIAGNOSIS — E78.2 MIXED HYPERLIPIDEMIA: ICD-10-CM

## 2021-10-19 DIAGNOSIS — I10 ESSENTIAL HYPERTENSION: Primary | ICD-10-CM

## 2021-10-19 DIAGNOSIS — E04.1 THYROID NODULE: ICD-10-CM

## 2021-10-19 DIAGNOSIS — Z86.73 HISTORY OF TIA (TRANSIENT ISCHEMIC ATTACK): ICD-10-CM

## 2021-10-19 DIAGNOSIS — J30.89 SEASONAL ALLERGIC RHINITIS DUE TO OTHER ALLERGIC TRIGGER: ICD-10-CM

## 2021-10-19 DIAGNOSIS — D51.9 ANEMIA DUE TO VITAMIN B12 DEFICIENCY, UNSPECIFIED B12 DEFICIENCY TYPE: ICD-10-CM

## 2021-10-19 DIAGNOSIS — I48.0 PAROXYSMAL ATRIAL FIBRILLATION: ICD-10-CM

## 2021-10-19 DIAGNOSIS — R73.03 PREDIABETES: ICD-10-CM

## 2021-10-19 PROCEDURE — 99999 PR PBB SHADOW E&M-EST. PATIENT-LVL V: CPT | Mod: PBBFAC,,, | Performed by: INTERNAL MEDICINE

## 2021-10-19 PROCEDURE — 99214 OFFICE O/P EST MOD 30 MIN: CPT | Mod: S$PBB,,, | Performed by: INTERNAL MEDICINE

## 2021-10-19 PROCEDURE — 90686 IIV4 VACC NO PRSV 0.5 ML IM: CPT | Mod: PBBFAC

## 2021-10-19 PROCEDURE — 99215 OFFICE O/P EST HI 40 MIN: CPT | Mod: PBBFAC | Performed by: INTERNAL MEDICINE

## 2021-10-19 PROCEDURE — 99999 PR PBB SHADOW E&M-EST. PATIENT-LVL V: ICD-10-PCS | Mod: PBBFAC,,, | Performed by: INTERNAL MEDICINE

## 2021-10-19 PROCEDURE — 99214 PR OFFICE/OUTPT VISIT, EST, LEVL IV, 30-39 MIN: ICD-10-PCS | Mod: S$PBB,,, | Performed by: INTERNAL MEDICINE

## 2021-10-19 RX ORDER — FLUTICASONE PROPIONATE 50 MCG
1 SPRAY, SUSPENSION (ML) NASAL DAILY
Qty: 16 G | Refills: 3 | Status: ON HOLD | OUTPATIENT
Start: 2021-10-19 | End: 2022-06-02 | Stop reason: HOSPADM

## 2021-10-31 ENCOUNTER — EXTERNAL CHRONIC CARE MANAGEMENT (OUTPATIENT)
Dept: PRIMARY CARE CLINIC | Facility: CLINIC | Age: 56
End: 2021-10-31
Payer: MEDICARE

## 2021-10-31 PROCEDURE — 99490 CHRNC CARE MGMT STAFF 1ST 20: CPT | Mod: PBBFAC | Performed by: INTERNAL MEDICINE

## 2021-10-31 PROCEDURE — 99490 PR CHRONIC CARE MGMT, 1ST 20 MIN: ICD-10-PCS | Mod: S$PBB,,, | Performed by: INTERNAL MEDICINE

## 2021-10-31 PROCEDURE — 99490 CHRNC CARE MGMT STAFF 1ST 20: CPT | Mod: S$PBB,,, | Performed by: INTERNAL MEDICINE

## 2021-11-04 ENCOUNTER — TELEPHONE (OUTPATIENT)
Dept: NEUROLOGY | Facility: CLINIC | Age: 56
End: 2021-11-04
Payer: MEDICARE

## 2021-11-08 ENCOUNTER — TELEPHONE (OUTPATIENT)
Dept: NEUROLOGY | Facility: CLINIC | Age: 56
End: 2021-11-08
Payer: MEDICARE

## 2021-11-08 DIAGNOSIS — G43.711 CHRONIC MIGRAINE WITHOUT AURA, WITH INTRACTABLE MIGRAINE, SO STATED, WITH STATUS MIGRAINOSUS: Primary | ICD-10-CM

## 2021-11-09 ENCOUNTER — PATIENT MESSAGE (OUTPATIENT)
Dept: PSYCHIATRY | Facility: CLINIC | Age: 56
End: 2021-11-09
Payer: MEDICARE

## 2021-11-11 ENCOUNTER — OFFICE VISIT (OUTPATIENT)
Dept: PSYCHIATRY | Facility: CLINIC | Age: 56
End: 2021-11-11
Payer: MEDICARE

## 2021-11-11 DIAGNOSIS — F33.1 DEPRESSION, MAJOR, RECURRENT, MODERATE: Primary | ICD-10-CM

## 2021-11-11 PROCEDURE — 90834 PR PSYCHOTHERAPY W/PATIENT, 45 MIN: ICD-10-PCS | Mod: 95,,, | Performed by: SOCIAL WORKER

## 2021-11-11 PROCEDURE — 90834 PSYTX W PT 45 MINUTES: CPT | Mod: 95,,, | Performed by: SOCIAL WORKER

## 2021-11-12 ENCOUNTER — PATIENT OUTREACH (OUTPATIENT)
Dept: ADMINISTRATIVE | Facility: HOSPITAL | Age: 56
End: 2021-11-12
Payer: MEDICARE

## 2021-11-18 ENCOUNTER — PATIENT MESSAGE (OUTPATIENT)
Dept: INTERNAL MEDICINE | Facility: CLINIC | Age: 56
End: 2021-11-18
Payer: MEDICARE

## 2021-11-30 ENCOUNTER — EXTERNAL CHRONIC CARE MANAGEMENT (OUTPATIENT)
Dept: PRIMARY CARE CLINIC | Facility: CLINIC | Age: 56
End: 2021-11-30
Payer: MEDICARE

## 2021-11-30 PROCEDURE — 99490 CHRNC CARE MGMT STAFF 1ST 20: CPT | Mod: S$PBB,,, | Performed by: INTERNAL MEDICINE

## 2021-11-30 PROCEDURE — 99490 CHRNC CARE MGMT STAFF 1ST 20: CPT | Mod: PBBFAC | Performed by: INTERNAL MEDICINE

## 2021-11-30 PROCEDURE — 99490 PR CHRONIC CARE MGMT, 1ST 20 MIN: ICD-10-PCS | Mod: S$PBB,,, | Performed by: INTERNAL MEDICINE

## 2021-12-01 ENCOUNTER — PATIENT MESSAGE (OUTPATIENT)
Dept: INTERNAL MEDICINE | Facility: CLINIC | Age: 56
End: 2021-12-01
Payer: MEDICARE

## 2021-12-02 ENCOUNTER — HOSPITAL ENCOUNTER (OUTPATIENT)
Dept: RADIOLOGY | Facility: HOSPITAL | Age: 56
Discharge: HOME | End: 2021-12-02
Attending: INTERNAL MEDICINE
Payer: MEDICARE

## 2021-12-02 ENCOUNTER — OFFICE VISIT (OUTPATIENT)
Dept: INTERNAL MEDICINE | Facility: CLINIC | Age: 56
End: 2021-12-02
Payer: MEDICARE

## 2021-12-02 VITALS
WEIGHT: 293 LBS | BODY MASS INDEX: 50.02 KG/M2 | HEART RATE: 82 BPM | SYSTOLIC BLOOD PRESSURE: 119 MMHG | DIASTOLIC BLOOD PRESSURE: 70 MMHG | HEIGHT: 64 IN | OXYGEN SATURATION: 96 %

## 2021-12-02 VITALS — BODY MASS INDEX: 48.65 KG/M2 | WEIGHT: 285 LBS | HEIGHT: 64 IN

## 2021-12-02 DIAGNOSIS — T61.91XA ALLERGIC REACTION TO SEAFOOD: Primary | ICD-10-CM

## 2021-12-02 DIAGNOSIS — G44.309 HEADACHES DUE TO OLD HEAD INJURY: Chronic | ICD-10-CM

## 2021-12-02 DIAGNOSIS — Z12.31 BREAST CANCER SCREENING BY MAMMOGRAM: ICD-10-CM

## 2021-12-02 DIAGNOSIS — S09.90XS HEADACHES DUE TO OLD HEAD INJURY: Chronic | ICD-10-CM

## 2021-12-02 DIAGNOSIS — E11.9 TYPE 2 DIABETES MELLITUS WITHOUT COMPLICATION, WITHOUT LONG-TERM CURRENT USE OF INSULIN: ICD-10-CM

## 2021-12-02 DIAGNOSIS — F41.9 ANXIETY: ICD-10-CM

## 2021-12-02 PROCEDURE — 77067 SCR MAMMO BI INCL CAD: CPT | Mod: 26,,, | Performed by: RADIOLOGY

## 2021-12-02 PROCEDURE — 77063 MAMMO DIGITAL SCREENING BILAT WITH TOMO: ICD-10-PCS | Mod: 26,,, | Performed by: RADIOLOGY

## 2021-12-02 PROCEDURE — 77063 BREAST TOMOSYNTHESIS BI: CPT | Mod: 26,,, | Performed by: RADIOLOGY

## 2021-12-02 PROCEDURE — 99999 PR PBB SHADOW E&M-EST. PATIENT-LVL V: CPT | Mod: PBBFAC,,, | Performed by: PHYSICIAN ASSISTANT

## 2021-12-02 PROCEDURE — 99215 OFFICE O/P EST HI 40 MIN: CPT | Mod: PBBFAC | Performed by: PHYSICIAN ASSISTANT

## 2021-12-02 PROCEDURE — 99214 PR OFFICE/OUTPT VISIT, EST, LEVL IV, 30-39 MIN: ICD-10-PCS | Mod: S$PBB,,, | Performed by: PHYSICIAN ASSISTANT

## 2021-12-02 PROCEDURE — 99999 PR PBB SHADOW E&M-EST. PATIENT-LVL V: ICD-10-PCS | Mod: PBBFAC,,, | Performed by: PHYSICIAN ASSISTANT

## 2021-12-02 PROCEDURE — 77067 MAMMO DIGITAL SCREENING BILAT WITH TOMO: ICD-10-PCS | Mod: 26,,, | Performed by: RADIOLOGY

## 2021-12-02 PROCEDURE — 99214 OFFICE O/P EST MOD 30 MIN: CPT | Mod: S$PBB,,, | Performed by: PHYSICIAN ASSISTANT

## 2021-12-02 PROCEDURE — 77067 SCR MAMMO BI INCL CAD: CPT | Mod: TC

## 2021-12-02 RX ORDER — EPINEPHRINE 0.3 MG/.3ML
1 INJECTION SUBCUTANEOUS ONCE
Qty: 0.3 ML | Refills: 0 | Status: SHIPPED | OUTPATIENT
Start: 2021-12-02 | End: 2023-10-10

## 2021-12-02 RX ORDER — PREDNISONE 10 MG/1
TABLET ORAL
Qty: 12 TABLET | Refills: 0 | Status: SHIPPED | OUTPATIENT
Start: 2021-12-02 | End: 2022-04-19

## 2021-12-09 ENCOUNTER — TELEPHONE (OUTPATIENT)
Dept: NEUROLOGY | Facility: CLINIC | Age: 56
End: 2021-12-09
Payer: MEDICARE

## 2021-12-09 ENCOUNTER — PATIENT MESSAGE (OUTPATIENT)
Dept: NEUROLOGY | Facility: CLINIC | Age: 56
End: 2021-12-09
Payer: MEDICARE

## 2021-12-10 ENCOUNTER — HOSPITAL ENCOUNTER (EMERGENCY)
Facility: HOSPITAL | Age: 56
Discharge: HOME | End: 2021-12-10
Attending: EMERGENCY MEDICINE
Payer: MEDICARE

## 2021-12-10 ENCOUNTER — PATIENT MESSAGE (OUTPATIENT)
Dept: INTERNAL MEDICINE | Facility: CLINIC | Age: 56
End: 2021-12-10
Payer: MEDICARE

## 2021-12-10 ENCOUNTER — PROCEDURE VISIT (OUTPATIENT)
Dept: NEUROLOGY | Facility: CLINIC | Age: 56
End: 2021-12-10
Payer: MEDICARE

## 2021-12-10 ENCOUNTER — OFFICE VISIT (OUTPATIENT)
Dept: CARDIOLOGY | Facility: CLINIC | Age: 56
End: 2021-12-10
Payer: MEDICARE

## 2021-12-10 VITALS
DIASTOLIC BLOOD PRESSURE: 94 MMHG | SYSTOLIC BLOOD PRESSURE: 166 MMHG | WEIGHT: 293 LBS | HEART RATE: 71 BPM | BODY MASS INDEX: 50.02 KG/M2 | HEIGHT: 64 IN

## 2021-12-10 VITALS
OXYGEN SATURATION: 100 % | TEMPERATURE: 99 F | BODY MASS INDEX: 50.02 KG/M2 | HEIGHT: 64 IN | SYSTOLIC BLOOD PRESSURE: 179 MMHG | RESPIRATION RATE: 19 BRPM | DIASTOLIC BLOOD PRESSURE: 77 MMHG | WEIGHT: 293 LBS | HEART RATE: 66 BPM

## 2021-12-10 VITALS
BODY MASS INDEX: 53.92 KG/M2 | HEART RATE: 69 BPM | DIASTOLIC BLOOD PRESSURE: 76 MMHG | SYSTOLIC BLOOD PRESSURE: 133 MMHG | HEIGHT: 64 IN

## 2021-12-10 DIAGNOSIS — Z86.73 HISTORY OF CVA (CEREBROVASCULAR ACCIDENT): ICD-10-CM

## 2021-12-10 DIAGNOSIS — I10 ESSENTIAL HYPERTENSION: ICD-10-CM

## 2021-12-10 DIAGNOSIS — I48.0 PAROXYSMAL ATRIAL FIBRILLATION: ICD-10-CM

## 2021-12-10 DIAGNOSIS — G47.33 OBSTRUCTIVE SLEEP APNEA: ICD-10-CM

## 2021-12-10 DIAGNOSIS — E78.2 MIXED HYPERLIPIDEMIA: ICD-10-CM

## 2021-12-10 DIAGNOSIS — Z74.09 IMPAIRED FUNCTIONAL MOBILITY, BALANCE, GAIT, AND ENDURANCE: Chronic | ICD-10-CM

## 2021-12-10 DIAGNOSIS — R07.9 CHEST PAIN: Primary | ICD-10-CM

## 2021-12-10 DIAGNOSIS — T61.91XA ALLERGIC REACTION TO SEAFOOD: Primary | ICD-10-CM

## 2021-12-10 DIAGNOSIS — G43.711 CHRONIC MIGRAINE WITHOUT AURA, WITH INTRACTABLE MIGRAINE, SO STATED, WITH STATUS MIGRAINOSUS: Primary | ICD-10-CM

## 2021-12-10 DIAGNOSIS — R07.2 PRECORDIAL PAIN: Primary | ICD-10-CM

## 2021-12-10 DIAGNOSIS — Z86.74 HISTORY OF SUDDEN CARDIAC ARREST: ICD-10-CM

## 2021-12-10 DIAGNOSIS — I44.2 COMPLETE AV BLOCK: ICD-10-CM

## 2021-12-10 DIAGNOSIS — I42.8 NONISCHEMIC CARDIOMYOPATHY: ICD-10-CM

## 2021-12-10 LAB
ALBUMIN SERPL BCP-MCNC: 2.9 G/DL (ref 3.5–5.2)
ALP SERPL-CCNC: 61 U/L (ref 55–135)
ALT SERPL W/O P-5'-P-CCNC: 25 U/L (ref 10–44)
ANION GAP SERPL CALC-SCNC: 12 MMOL/L (ref 8–16)
AST SERPL-CCNC: 19 U/L (ref 10–40)
BASOPHILS # BLD AUTO: 0.01 K/UL (ref 0–0.2)
BASOPHILS NFR BLD: 0.2 % (ref 0–1.9)
BILIRUB SERPL-MCNC: 0.2 MG/DL (ref 0.1–1)
BNP SERPL-MCNC: <10 PG/ML (ref 0–99)
BUN SERPL-MCNC: 10 MG/DL (ref 6–20)
CALCIUM SERPL-MCNC: 8.8 MG/DL (ref 8.7–10.5)
CHLORIDE SERPL-SCNC: 102 MMOL/L (ref 95–110)
CO2 SERPL-SCNC: 23 MMOL/L (ref 23–29)
CREAT SERPL-MCNC: 0.9 MG/DL (ref 0.5–1.4)
CTP QC/QA: YES
DIFFERENTIAL METHOD: ABNORMAL
EOSINOPHIL # BLD AUTO: 0 K/UL (ref 0–0.5)
EOSINOPHIL NFR BLD: 0.2 % (ref 0–8)
ERYTHROCYTE [DISTWIDTH] IN BLOOD BY AUTOMATED COUNT: 16.2 % (ref 11.5–14.5)
EST. GFR  (AFRICAN AMERICAN): >60 ML/MIN/1.73 M^2
EST. GFR  (NON AFRICAN AMERICAN): >60 ML/MIN/1.73 M^2
GLUCOSE SERPL-MCNC: 114 MG/DL (ref 70–110)
HCT VFR BLD AUTO: 34.6 % (ref 37–48.5)
HGB BLD-MCNC: 11.4 G/DL (ref 12–16)
IMM GRANULOCYTES # BLD AUTO: 0.01 K/UL (ref 0–0.04)
IMM GRANULOCYTES NFR BLD AUTO: 0.2 % (ref 0–0.5)
LYMPHOCYTES # BLD AUTO: 1.8 K/UL (ref 1–4.8)
LYMPHOCYTES NFR BLD: 37.1 % (ref 18–48)
MCH RBC QN AUTO: 30.9 PG (ref 27–31)
MCHC RBC AUTO-ENTMCNC: 32.9 G/DL (ref 32–36)
MCV RBC AUTO: 94 FL (ref 82–98)
MONOCYTES # BLD AUTO: 0.5 K/UL (ref 0.3–1)
MONOCYTES NFR BLD: 9.9 % (ref 4–15)
NEUTROPHILS # BLD AUTO: 2.6 K/UL (ref 1.8–7.7)
NEUTROPHILS NFR BLD: 52.4 % (ref 38–73)
NRBC BLD-RTO: 0 /100 WBC
PLATELET # BLD AUTO: 237 K/UL (ref 150–450)
PMV BLD AUTO: 11.5 FL (ref 9.2–12.9)
POTASSIUM SERPL-SCNC: 4.3 MMOL/L (ref 3.5–5.1)
PROT SERPL-MCNC: 9.6 G/DL (ref 6–8.4)
RBC # BLD AUTO: 3.69 M/UL (ref 4–5.4)
SARS-COV-2 RDRP RESP QL NAA+PROBE: NEGATIVE
SODIUM SERPL-SCNC: 137 MMOL/L (ref 136–145)
TROPONIN I SERPL DL<=0.01 NG/ML-MCNC: 0.01 NG/ML (ref 0–0.03)
TROPONIN I SERPL DL<=0.01 NG/ML-MCNC: 0.01 NG/ML (ref 0–0.03)
WBC # BLD AUTO: 4.96 K/UL (ref 3.9–12.7)

## 2021-12-10 PROCEDURE — 96374 THER/PROPH/DIAG INJ IV PUSH: CPT

## 2021-12-10 PROCEDURE — 99499 UNLISTED E&M SERVICE: CPT | Mod: S$PBB,,, | Performed by: PSYCHIATRY & NEUROLOGY

## 2021-12-10 PROCEDURE — 80053 COMPREHEN METABOLIC PANEL: CPT | Performed by: PHYSICIAN ASSISTANT

## 2021-12-10 PROCEDURE — 85025 COMPLETE CBC W/AUTO DIFF WBC: CPT | Performed by: PHYSICIAN ASSISTANT

## 2021-12-10 PROCEDURE — 99214 PR OFFICE/OUTPT VISIT, EST, LEVL IV, 30-39 MIN: ICD-10-PCS | Mod: S$PBB,,, | Performed by: INTERNAL MEDICINE

## 2021-12-10 PROCEDURE — 93005 ELECTROCARDIOGRAM TRACING: CPT | Mod: 59

## 2021-12-10 PROCEDURE — 64615 CHEMODENERV MUSC MIGRAINE: CPT | Mod: S$PBB,,, | Performed by: PSYCHIATRY & NEUROLOGY

## 2021-12-10 PROCEDURE — 99214 OFFICE O/P EST MOD 30 MIN: CPT | Mod: S$PBB,,, | Performed by: INTERNAL MEDICINE

## 2021-12-10 PROCEDURE — 64615 CHEMODENERV MUSC MIGRAINE: CPT | Mod: PBBFAC | Performed by: PSYCHIATRY & NEUROLOGY

## 2021-12-10 PROCEDURE — 99213 OFFICE O/P EST LOW 20 MIN: CPT | Mod: PBBFAC | Performed by: INTERNAL MEDICINE

## 2021-12-10 PROCEDURE — 94761 N-INVAS EAR/PLS OXIMETRY MLT: CPT

## 2021-12-10 PROCEDURE — 63600175 PHARM REV CODE 636 W HCPCS: Performed by: PHYSICIAN ASSISTANT

## 2021-12-10 PROCEDURE — 93010 ELECTROCARDIOGRAM REPORT: CPT | Mod: 76,,, | Performed by: INTERNAL MEDICINE

## 2021-12-10 PROCEDURE — 83880 ASSAY OF NATRIURETIC PEPTIDE: CPT | Performed by: PHYSICIAN ASSISTANT

## 2021-12-10 PROCEDURE — 93010 ELECTROCARDIOGRAM REPORT: CPT | Mod: ,,, | Performed by: INTERNAL MEDICINE

## 2021-12-10 PROCEDURE — 86803 HEPATITIS C AB TEST: CPT | Performed by: EMERGENCY MEDICINE

## 2021-12-10 PROCEDURE — 25000242 PHARM REV CODE 250 ALT 637 W/ HCPCS: Performed by: PHYSICIAN ASSISTANT

## 2021-12-10 PROCEDURE — 93010 EKG 12-LEAD: ICD-10-PCS | Mod: ,,, | Performed by: INTERNAL MEDICINE

## 2021-12-10 PROCEDURE — 99285 EMERGENCY DEPT VISIT HI MDM: CPT | Mod: CS,,, | Performed by: PHYSICIAN ASSISTANT

## 2021-12-10 PROCEDURE — 93010 EKG 12-LEAD: ICD-10-PCS | Mod: 76,,, | Performed by: INTERNAL MEDICINE

## 2021-12-10 PROCEDURE — 99285 PR EMERGENCY DEPT VISIT,LEVEL V: ICD-10-PCS | Mod: CS,,, | Performed by: PHYSICIAN ASSISTANT

## 2021-12-10 PROCEDURE — 99285 EMERGENCY DEPT VISIT HI MDM: CPT | Mod: 25,27

## 2021-12-10 PROCEDURE — 99999 PR PBB SHADOW E&M-EST. PATIENT-LVL III: ICD-10-PCS | Mod: PBBFAC,,, | Performed by: INTERNAL MEDICINE

## 2021-12-10 PROCEDURE — U0002 COVID-19 LAB TEST NON-CDC: HCPCS | Performed by: PHYSICIAN ASSISTANT

## 2021-12-10 PROCEDURE — 99999 PR PBB SHADOW E&M-EST. PATIENT-LVL III: CPT | Mod: PBBFAC,,, | Performed by: INTERNAL MEDICINE

## 2021-12-10 PROCEDURE — 87389 HIV-1 AG W/HIV-1&-2 AB AG IA: CPT | Performed by: EMERGENCY MEDICINE

## 2021-12-10 PROCEDURE — 84484 ASSAY OF TROPONIN QUANT: CPT | Mod: 91 | Performed by: PHYSICIAN ASSISTANT

## 2021-12-10 PROCEDURE — 64615 PR CHEMODENERVATION OF MUSCLE FOR CHRONIC MIGRAINE: ICD-10-PCS | Mod: S$PBB,,, | Performed by: PSYCHIATRY & NEUROLOGY

## 2021-12-10 PROCEDURE — 99499 NO LOS: ICD-10-PCS | Mod: S$PBB,,, | Performed by: PSYCHIATRY & NEUROLOGY

## 2021-12-10 RX ORDER — NITROGLYCERIN 0.4 MG/1
0.4 TABLET SUBLINGUAL
Status: COMPLETED | OUTPATIENT
Start: 2021-12-10 | End: 2021-12-10

## 2021-12-10 RX ORDER — TIZANIDINE 4 MG/1
4 TABLET ORAL EVERY 6 HOURS PRN
Qty: 90 TABLET | Refills: 12 | Status: SHIPPED | OUTPATIENT
Start: 2021-12-10 | End: 2022-10-19

## 2021-12-10 RX ORDER — NITROGLYCERIN 0.4 MG/1
0.4 TABLET SUBLINGUAL
Status: DISCONTINUED | OUTPATIENT
Start: 2021-12-10 | End: 2021-12-10 | Stop reason: HOSPADM

## 2021-12-10 RX ORDER — MORPHINE SULFATE 4 MG/ML
4 INJECTION, SOLUTION INTRAMUSCULAR; INTRAVENOUS
Status: COMPLETED | OUTPATIENT
Start: 2021-12-10 | End: 2021-12-10

## 2021-12-10 RX ADMIN — ONABOTULINUMTOXINA 200 UNITS: 100 INJECTION, POWDER, LYOPHILIZED, FOR SOLUTION INTRADERMAL; INTRAMUSCULAR at 11:12

## 2021-12-10 RX ADMIN — NITROGLYCERIN 0.4 MG: 0.4 TABLET, ORALLY DISINTEGRATING SUBLINGUAL at 04:12

## 2021-12-10 RX ADMIN — MORPHINE SULFATE 4 MG: 4 INJECTION INTRAVENOUS at 06:12

## 2021-12-13 ENCOUNTER — TELEPHONE (OUTPATIENT)
Dept: INTERNAL MEDICINE | Facility: CLINIC | Age: 56
End: 2021-12-13
Payer: MEDICARE

## 2021-12-13 ENCOUNTER — OUTPATIENT CASE MANAGEMENT (OUTPATIENT)
Dept: ADMINISTRATIVE | Facility: OTHER | Age: 56
End: 2021-12-13
Payer: MEDICARE

## 2021-12-13 LAB — HCV AB SERPL QL IA: NEGATIVE

## 2021-12-14 ENCOUNTER — PATIENT MESSAGE (OUTPATIENT)
Dept: ADMINISTRATIVE | Facility: OTHER | Age: 56
End: 2021-12-14
Payer: MEDICARE

## 2021-12-14 ENCOUNTER — PATIENT MESSAGE (OUTPATIENT)
Dept: INTERNAL MEDICINE | Facility: CLINIC | Age: 56
End: 2021-12-14
Payer: MEDICARE

## 2021-12-14 LAB — HIV 1+2 AB+HIV1 P24 AG SERPL QL IA: NEGATIVE

## 2021-12-16 ENCOUNTER — TELEPHONE (OUTPATIENT)
Dept: CARDIOLOGY | Facility: HOSPITAL | Age: 56
End: 2021-12-16
Payer: MEDICARE

## 2021-12-17 ENCOUNTER — TELEPHONE (OUTPATIENT)
Dept: INTERNAL MEDICINE | Facility: CLINIC | Age: 56
End: 2021-12-17
Payer: MEDICARE

## 2021-12-20 ENCOUNTER — OUTPATIENT CASE MANAGEMENT (OUTPATIENT)
Dept: ADMINISTRATIVE | Facility: OTHER | Age: 56
End: 2021-12-20
Payer: MEDICARE

## 2021-12-20 ENCOUNTER — PATIENT OUTREACH (OUTPATIENT)
Dept: ADMINISTRATIVE | Facility: HOSPITAL | Age: 56
End: 2021-12-20
Payer: MEDICARE

## 2021-12-21 RX ORDER — TIZANIDINE 4 MG/1
4 TABLET ORAL EVERY 6 HOURS PRN
Qty: 90 TABLET | Refills: 12 | Status: SHIPPED | OUTPATIENT
Start: 2021-12-21 | End: 2022-10-19

## 2021-12-22 ENCOUNTER — PATIENT OUTREACH (OUTPATIENT)
Dept: ADMINISTRATIVE | Facility: HOSPITAL | Age: 56
End: 2021-12-22
Payer: MEDICARE

## 2021-12-22 ENCOUNTER — PATIENT MESSAGE (OUTPATIENT)
Dept: ADMINISTRATIVE | Facility: OTHER | Age: 56
End: 2021-12-22
Payer: MEDICARE

## 2021-12-27 ENCOUNTER — HOSPITAL ENCOUNTER (OUTPATIENT)
Dept: CARDIOLOGY | Facility: HOSPITAL | Age: 56
Discharge: HOME | End: 2021-12-27
Attending: NURSE PRACTITIONER
Payer: MEDICARE

## 2021-12-27 VITALS
HEART RATE: 60 BPM | HEIGHT: 64 IN | WEIGHT: 293 LBS | SYSTOLIC BLOOD PRESSURE: 169 MMHG | BODY MASS INDEX: 50.02 KG/M2 | DIASTOLIC BLOOD PRESSURE: 73 MMHG

## 2021-12-27 DIAGNOSIS — I42.8 NONISCHEMIC CARDIOMYOPATHY: ICD-10-CM

## 2021-12-27 DIAGNOSIS — Z95.0 PACEMAKER: ICD-10-CM

## 2021-12-27 LAB
ASCENDING AORTA: 3.06 CM
AV INDEX (PROSTH): 0.69
AV MEAN GRADIENT: 5 MMHG
AV PEAK GRADIENT: 9 MMHG
AV VALVE AREA: 2.99 CM2
AV VELOCITY RATIO: 0.6
BSA FOR ECHO PROCEDURE: 2.54 M2
CV ECHO LV RWT: 0.47 CM
DOP CALC AO PEAK VEL: 1.54 M/S
DOP CALC AO VTI: 34.77 CM
DOP CALC LVOT AREA: 4.3 CM2
DOP CALC LVOT DIAMETER: 2.35 CM
DOP CALC LVOT PEAK VEL: 0.93 M/S
DOP CALC LVOT STROKE VOLUME: 104.09 CM3
DOP CALCLVOT PEAK VEL VTI: 24.01 CM
E WAVE DECELERATION TIME: 214.54 MSEC
E/A RATIO: 0.82
E/E' RATIO: 10.46 M/S
ECHO LV POSTERIOR WALL: 1.3 CM (ref 0.6–1.1)
EJECTION FRACTION: 58 %
FRACTIONAL SHORTENING: 31 % (ref 28–44)
INTERVENTRICULAR SEPTUM: 1.26 CM (ref 0.6–1.1)
LA MAJOR: 5.73 CM
LA MINOR: 5.66 CM
LA WIDTH: 4.38 CM
LEFT ATRIUM SIZE: 4.27 CM
LEFT ATRIUM VOLUME INDEX MOD: 36.2 ML/M2
LEFT ATRIUM VOLUME INDEX: 38.2 ML/M2
LEFT ATRIUM VOLUME MOD: 85.77 CM3
LEFT ATRIUM VOLUME: 90.53 CM3
LEFT INTERNAL DIMENSION IN SYSTOLE: 3.82 CM (ref 2.1–4)
LEFT VENTRICLE DIASTOLIC VOLUME INDEX: 34.14 ML/M2
LEFT VENTRICLE DIASTOLIC VOLUME: 80.9 ML
LEFT VENTRICLE MASS INDEX: 126 G/M2
LEFT VENTRICLE SYSTOLIC VOLUME INDEX: 26.4 ML/M2
LEFT VENTRICLE SYSTOLIC VOLUME: 62.64 ML
LEFT VENTRICULAR INTERNAL DIMENSION IN DIASTOLE: 5.5 CM (ref 3.5–6)
LEFT VENTRICULAR MASS: 297.82 G
LV LATERAL E/E' RATIO: 8.5 M/S
LV SEPTAL E/E' RATIO: 13.6 M/S
MV A" WAVE DURATION": 16.75 MSEC
MV PEAK A VEL: 0.83 M/S
MV PEAK E VEL: 0.68 M/S
MV STENOSIS PRESSURE HALF TIME: 62.22 MS
MV VALVE AREA P 1/2 METHOD: 3.54 CM2
PISA TR MAX VEL: 2.6 M/S
PULM VEIN S/D RATIO: 1.77
PV PEAK D VEL: 0.26 M/S
PV PEAK S VEL: 0.46 M/S
RA MAJOR: 5.16 CM
RA PRESSURE: 3 MMHG
RA WIDTH: 3.6 CM
RIGHT VENTRICULAR END-DIASTOLIC DIMENSION: 3.76 CM
RV TISSUE DOPPLER FREE WALL SYSTOLIC VELOCITY 1 (APICAL 4 CHAMBER VIEW): 13.19 CM/S
SINUS: 3.33 CM
STJ: 3.05 CM
TDI LATERAL: 0.08 M/S
TDI SEPTAL: 0.05 M/S
TDI: 0.07 M/S
TR MAX PG: 27 MMHG
TRICUSPID ANNULAR PLANE SYSTOLIC EXCURSION: 2.34 CM
TV REST PULMONARY ARTERY PRESSURE: 30 MMHG

## 2021-12-27 PROCEDURE — 93306 TTE W/DOPPLER COMPLETE: CPT

## 2021-12-27 PROCEDURE — 93306 TTE W/DOPPLER COMPLETE: CPT | Mod: 26,,, | Performed by: INTERNAL MEDICINE

## 2021-12-27 PROCEDURE — 93306 ECHO (CUPID ONLY): ICD-10-PCS | Mod: 26,,, | Performed by: INTERNAL MEDICINE

## 2021-12-28 ENCOUNTER — PATIENT OUTREACH (OUTPATIENT)
Dept: ADMINISTRATIVE | Facility: HOSPITAL | Age: 56
End: 2021-12-28
Payer: MEDICARE

## 2021-12-30 ENCOUNTER — PATIENT MESSAGE (OUTPATIENT)
Dept: INTERNAL MEDICINE | Facility: CLINIC | Age: 56
End: 2021-12-30
Payer: MEDICARE

## 2021-12-31 ENCOUNTER — EXTERNAL CHRONIC CARE MANAGEMENT (OUTPATIENT)
Dept: PRIMARY CARE CLINIC | Facility: CLINIC | Age: 56
End: 2021-12-31
Payer: MEDICARE

## 2021-12-31 PROCEDURE — 99490 CHRNC CARE MGMT STAFF 1ST 20: CPT | Mod: S$PBB,,, | Performed by: INTERNAL MEDICINE

## 2021-12-31 PROCEDURE — 99490 CHRNC CARE MGMT STAFF 1ST 20: CPT | Mod: PBBFAC | Performed by: INTERNAL MEDICINE

## 2021-12-31 PROCEDURE — 99490 PR CHRONIC CARE MGMT, 1ST 20 MIN: ICD-10-PCS | Mod: S$PBB,,, | Performed by: INTERNAL MEDICINE

## 2022-01-03 ENCOUNTER — OUTPATIENT CASE MANAGEMENT (OUTPATIENT)
Dept: ADMINISTRATIVE | Facility: OTHER | Age: 57
End: 2022-01-03
Payer: MEDICARE

## 2022-01-03 NOTE — PROGRESS NOTES
Outpatient Care Management  Plan of Care Follow Up Visit    Patient: Amna Chawla  MRN: [id number]  Date of Service: 01/03/2022  Completed by: Julissa Hand RN  Referral Date: 12/10/2021  Program: Case Management (High Risk)    Reason for Visit   Patient presents with    OPCM Chart Review    Update Plan Of Care    Follow-up       Brief Summary:   Pt answered phone but requested call back as she is on the phone with sleep lab about her cpap.    Advised pt to call the  of the device as the would be responsible for setting up replacement.      Pt is due covid booster  Pt states she is aware and was on steriods so they advised her to wait and primary care will advise on timing        Next steps from previous encounter  Pt requested to mail educational information  noted   Best number to contact pt   [phone]  Did pt do what they agreed on at admit:  Pt agrees to  Get her daughter to place on arm when home in the evening for testing compliance noted not done  If unable to do that encourage pt to get a wrist bp device done     Follow up on receipt of education material sent in mail done and received    Interventions  Chart reviewed  Reviewed upcoming appt schedule        Assess/review short term goals met       Patient agrees to follow up call in 4 weeks      Next steps  Fu on contact from [facility] re resources for eye glasses  Review educational information on hypertension  Best number to contact pt   [phone]  Did pt do what they agreed on :  To purchase a wrist bp device          Patient Summary     Involvement of Care:  Do I have permission to speak with other family members about your care?       Patient Reported Labs & Vitals:  1.  Any Patient Reported Labs & Vitals?     2.  Patient Reported Blood Pressure:     3.  Patient Reported Pulse:     4.  Patient Reported Weight (Kg):     5.  Patient Reported Blood Glucose (mg/dl):       Medical and social history was reviewed with patient and/or caregiver.      Clinical Assessment     Reviewed and provided basic information on available community resources for mental health, transportation, wellness resources, and palliative care programs with patient and/or caregiver.     Complex Care Plan     Care plan was discussed and completed today with input from patient and/or caregiver.    Patient Instructions     Instructions were provided via the [employer] patient resources and are available for the patient to view on the patient portal.      Todays OPCM Self-Management Care Plan was developed with the patients/caregivers input and was based on identified barriers from todays assessment.  Goals were written today with the patient/caregiver and the patient has agreed to work towards these goals to improve his/her overall well-being. Patient verbalized understanding of the care plan, goals, and all of today's instructions. Encouraged patient/caregiver to communicate with his/her physician and health care team about health conditions and the treatment plan.  Provided my contact information today and encouraged patient/caregiver to call me with any questions as needed.

## 2022-01-04 ENCOUNTER — TELEPHONE (OUTPATIENT)
Dept: SLEEP MEDICINE | Facility: CLINIC | Age: 57
End: 2022-01-04
Payer: MEDICARE

## 2022-01-04 ENCOUNTER — PATIENT OUTREACH (OUTPATIENT)
Dept: ADMINISTRATIVE | Facility: HOSPITAL | Age: 57
End: 2022-01-04
Payer: MEDICARE

## 2022-01-04 ENCOUNTER — TELEPHONE (OUTPATIENT)
Dept: INTERNAL MEDICINE | Facility: CLINIC | Age: 57
End: 2022-01-04
Payer: MEDICARE

## 2022-01-04 DIAGNOSIS — T30.0 BURN: Primary | ICD-10-CM

## 2022-01-04 RX ORDER — SILVER SULFADIAZINE 10 G/1000G
CREAM TOPICAL 2 TIMES DAILY
Qty: 50 G | Refills: 0 | Status: SHIPPED | OUTPATIENT
Start: 2022-01-04 | End: 2023-10-18

## 2022-01-04 NOTE — TELEPHONE ENCOUNTER
Staff reached out to the pt and she did not answer, so I left a message on her vm to call back to discuss her CPAP machine

## 2022-01-04 NOTE — PROGRESS NOTES
Health Maintenance Due   Topic Date Due    COVID-19 Vaccine (3 - Booster for Moderna series) 09/07/2021     Triggered LINKS.  Updated Care Everywhere.  Checked pt's Hypertension Digital Medicine flow sheet for an updated BP reading.  Chart review completed.
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
## 2022-01-04 NOTE — TELEPHONE ENCOUNTER
Spoke to the pt and explained to her exactly what Lorena stated and I also gave the pt the dickson number for [facility], so that she can registered her machine
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
## 2022-01-10 ENCOUNTER — IMMUNIZATION (OUTPATIENT)
Dept: PRIMARY CARE CLINIC | Facility: CLINIC | Age: 57
End: 2022-01-10
Payer: MEDICARE

## 2022-01-10 DIAGNOSIS — Z23 NEED FOR VACCINATION: Primary | ICD-10-CM

## 2022-01-10 PROCEDURE — 0064A COVID-19, MRNA, LNP-S, PF, 100 MCG/0.25 ML DOSE VACCINE (MODERNA BOOSTER): CPT | Mod: CV19,PBBFAC | Performed by: INTERNAL MEDICINE

## 2022-01-11 ENCOUNTER — TELEPHONE (OUTPATIENT)
Dept: SLEEP MEDICINE | Facility: CLINIC | Age: 57
End: 2022-01-11
Payer: MEDICARE

## 2022-01-11 ENCOUNTER — OUTPATIENT CASE MANAGEMENT (OUTPATIENT)
Dept: ADMINISTRATIVE | Facility: OTHER | Age: 57
End: 2022-01-11
Payer: MEDICARE

## 2022-01-12 ENCOUNTER — CLINICAL SUPPORT (OUTPATIENT)
Dept: CARDIOLOGY | Facility: HOSPITAL | Age: 57
End: 2022-01-12
Payer: MEDICARE

## 2022-01-12 ENCOUNTER — PATIENT MESSAGE (OUTPATIENT)
Dept: ADMINISTRATIVE | Facility: OTHER | Age: 57
End: 2022-01-12
Payer: MEDICARE

## 2022-01-12 ENCOUNTER — PATIENT MESSAGE (OUTPATIENT)
Dept: INTERNAL MEDICINE | Facility: CLINIC | Age: 57
End: 2022-01-12
Payer: MEDICARE

## 2022-01-12 DIAGNOSIS — Z95.810 PRESENCE OF AUTOMATIC (IMPLANTABLE) CARDIAC DEFIBRILLATOR: ICD-10-CM

## 2022-01-12 DIAGNOSIS — I42.9 CARDIOMYOPATHY, UNSPECIFIED: ICD-10-CM

## 2022-01-12 DIAGNOSIS — I48.91 UNSPECIFIED ATRIAL FIBRILLATION: ICD-10-CM

## 2022-01-12 DIAGNOSIS — I44.2 ATRIOVENTRICULAR BLOCK, COMPLETE: ICD-10-CM

## 2022-01-12 DIAGNOSIS — I47.20 VENTRICULAR TACHYCARDIA: ICD-10-CM

## 2022-01-12 PROCEDURE — 93295 CARDIAC DEVICE CHECK - REMOTE: ICD-10-PCS | Mod: ,,, | Performed by: INTERNAL MEDICINE

## 2022-01-12 PROCEDURE — 93295 DEV INTERROG REMOTE 1/2/MLT: CPT | Mod: ,,, | Performed by: INTERNAL MEDICINE
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
## 2022-01-13 ENCOUNTER — OFFICE VISIT (OUTPATIENT)
Dept: ELECTROPHYSIOLOGY | Facility: CLINIC | Age: 57
End: 2022-01-13
Payer: MEDICARE

## 2022-01-13 ENCOUNTER — CLINICAL SUPPORT (OUTPATIENT)
Dept: CARDIOLOGY | Facility: HOSPITAL | Age: 57
End: 2022-01-13
Attending: INTERNAL MEDICINE
Payer: MEDICARE

## 2022-01-13 ENCOUNTER — HOSPITAL ENCOUNTER (OUTPATIENT)
Dept: CARDIOLOGY | Facility: CLINIC | Age: 57
Discharge: HOME | End: 2022-01-13
Payer: MEDICARE

## 2022-01-13 VITALS
BODY MASS INDEX: 53.92 KG/M2 | WEIGHT: 293 LBS | HEIGHT: 62 IN | SYSTOLIC BLOOD PRESSURE: 122 MMHG | HEART RATE: 77 BPM | DIASTOLIC BLOOD PRESSURE: 78 MMHG

## 2022-01-13 DIAGNOSIS — Z95.810 BIVENTRICULAR AUTOMATIC IMPLANTABLE CARDIOVERTER DEFIBRILLATOR IN SITU: ICD-10-CM

## 2022-01-13 DIAGNOSIS — I44.2 COMPLETE AV BLOCK: Primary | ICD-10-CM

## 2022-01-13 DIAGNOSIS — I49.8 OTHER SPECIFIED CARDIAC ARRHYTHMIAS: ICD-10-CM

## 2022-01-13 DIAGNOSIS — Z79.01 LONG TERM (CURRENT) USE OF ANTICOAGULANTS: ICD-10-CM

## 2022-01-13 DIAGNOSIS — I42.8 NONISCHEMIC CARDIOMYOPATHY: ICD-10-CM

## 2022-01-13 DIAGNOSIS — G47.33 OBSTRUCTIVE SLEEP APNEA: ICD-10-CM

## 2022-01-13 DIAGNOSIS — I48.0 PAROXYSMAL ATRIAL FIBRILLATION: ICD-10-CM

## 2022-01-13 DIAGNOSIS — T82.110D PACEMAKER LEAD MALFUNCTION, SUBSEQUENT ENCOUNTER: ICD-10-CM

## 2022-01-13 PROBLEM — T82.110A PACEMAKER LEAD MALFUNCTION: Status: RESOLVED | Noted: 2020-08-14 | Resolved: 2022-01-13

## 2022-01-13 PROBLEM — T82.198A MALFUNCTION OF ELECTRODE LEAD OF IMPLANTABLE CARDIOVERTER-DEFIBRILLATOR (ICD): Status: RESOLVED | Noted: 2020-07-22 | Resolved: 2022-01-13

## 2022-01-13 PROCEDURE — 99214 OFFICE O/P EST MOD 30 MIN: CPT | Mod: S$PBB,,, | Performed by: INTERNAL MEDICINE

## 2022-01-13 PROCEDURE — 93010 RHYTHM STRIP: ICD-10-PCS | Mod: S$PBB,,, | Performed by: INTERNAL MEDICINE

## 2022-01-13 PROCEDURE — 93010 ELECTROCARDIOGRAM REPORT: CPT | Mod: S$PBB,,, | Performed by: INTERNAL MEDICINE

## 2022-01-13 PROCEDURE — 99212 OFFICE O/P EST SF 10 MIN: CPT | Mod: PBBFAC | Performed by: INTERNAL MEDICINE

## 2022-01-13 PROCEDURE — 99214 PR OFFICE/OUTPT VISIT, EST, LEVL IV, 30-39 MIN: ICD-10-PCS | Mod: S$PBB,,, | Performed by: INTERNAL MEDICINE

## 2022-01-13 PROCEDURE — 93005 ELECTROCARDIOGRAM TRACING: CPT | Mod: PBBFAC,59 | Performed by: INTERNAL MEDICINE

## 2022-01-13 PROCEDURE — 99999 PR PBB SHADOW E&M-EST. PATIENT-LVL II: ICD-10-PCS | Mod: PBBFAC,,, | Performed by: INTERNAL MEDICINE

## 2022-01-13 PROCEDURE — 93284 PRGRMG EVAL IMPLANTABLE DFB: CPT

## 2022-01-13 PROCEDURE — 93284 CARDIAC DEVICE CHECK - IN CLINIC & HOSPITAL: ICD-10-PCS | Mod: 26,,, | Performed by: INTERNAL MEDICINE

## 2022-01-13 PROCEDURE — 99999 PR PBB SHADOW E&M-EST. PATIENT-LVL II: CPT | Mod: PBBFAC,,, | Performed by: INTERNAL MEDICINE

## 2022-01-13 PROCEDURE — 93284 PRGRMG EVAL IMPLANTABLE DFB: CPT | Mod: 26,,, | Performed by: INTERNAL MEDICINE
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
## 2022-01-19 ENCOUNTER — OUTPATIENT CASE MANAGEMENT (OUTPATIENT)
Dept: ADMINISTRATIVE | Facility: OTHER | Age: 57
End: 2022-01-19
Payer: MEDICARE

## 2022-01-19 NOTE — PROGRESS NOTES
1st Attempt to complete SW follow-up for Outpatient Care Management; left message requesting return call.  LCSW will reattempt at a later date.

## 2022-01-21 ENCOUNTER — OFFICE VISIT (OUTPATIENT)
Dept: OTOLARYNGOLOGY | Facility: CLINIC | Age: 57
End: 2022-01-21
Payer: MEDICARE

## 2022-01-21 VITALS
BODY MASS INDEX: 58.06 KG/M2 | DIASTOLIC BLOOD PRESSURE: 84 MMHG | SYSTOLIC BLOOD PRESSURE: 146 MMHG | WEIGHT: 293 LBS | HEART RATE: 78 BPM

## 2022-01-21 DIAGNOSIS — J02.9 PHARYNGITIS, UNSPECIFIED ETIOLOGY: Primary | ICD-10-CM

## 2022-01-21 PROCEDURE — 99999 PR PBB SHADOW E&M-EST. PATIENT-LVL V: ICD-10-PCS | Mod: PBBFAC,,, | Performed by: STUDENT IN AN ORGANIZED HEALTH CARE EDUCATION/TRAINING PROGRAM

## 2022-01-21 PROCEDURE — 31575 DIAGNOSTIC LARYNGOSCOPY: CPT | Mod: PBBFAC | Performed by: STUDENT IN AN ORGANIZED HEALTH CARE EDUCATION/TRAINING PROGRAM

## 2022-01-21 PROCEDURE — 99213 OFFICE O/P EST LOW 20 MIN: CPT | Mod: 25,S$PBB,, | Performed by: STUDENT IN AN ORGANIZED HEALTH CARE EDUCATION/TRAINING PROGRAM

## 2022-01-21 PROCEDURE — 99999 PR PBB SHADOW E&M-EST. PATIENT-LVL V: CPT | Mod: PBBFAC,,, | Performed by: STUDENT IN AN ORGANIZED HEALTH CARE EDUCATION/TRAINING PROGRAM

## 2022-01-21 PROCEDURE — 31575 PR LARYNGOSCOPY, FLEXIBLE; DIAGNOSTIC: ICD-10-PCS | Mod: S$PBB,,, | Performed by: STUDENT IN AN ORGANIZED HEALTH CARE EDUCATION/TRAINING PROGRAM

## 2022-01-21 PROCEDURE — 99213 PR OFFICE/OUTPT VISIT, EST, LEVL III, 20-29 MIN: ICD-10-PCS | Mod: 25,S$PBB,, | Performed by: STUDENT IN AN ORGANIZED HEALTH CARE EDUCATION/TRAINING PROGRAM

## 2022-01-21 PROCEDURE — 31575 DIAGNOSTIC LARYNGOSCOPY: CPT | Mod: S$PBB,,, | Performed by: STUDENT IN AN ORGANIZED HEALTH CARE EDUCATION/TRAINING PROGRAM

## 2022-01-21 PROCEDURE — 99215 OFFICE O/P EST HI 40 MIN: CPT | Mod: PBBFAC | Performed by: STUDENT IN AN ORGANIZED HEALTH CARE EDUCATION/TRAINING PROGRAM

## 2022-01-21 NOTE — PROGRESS NOTES
Otolaryngology - Head and Neck Surgery New Patient Visit    1/21/2022    Referring Provider: No ref. provider found    Chief Complaint   Patient presents with    Sore Throat       History of Present Illness, Otolaryngology Specialty-Specific Exam, and Assessment and Plan:     Amna Chawla is a 56 y.o. female who presents for evaluation of a sore throat, which has been present for over 1 week. She complains of getting her COVID booster and started having symptoms of cough and sore throat since. She previously had the same symptoms after her first 2 shots. She denies any stridor or significant weight loss over this time period. She has been treated with motrin in the past. She has never had allergy testing. She denies previous skullbase surgery. She snores but denies any pauses.     On exam today, the ears are normal. The oral cavity is clear. The neck is clear. The nasopharynx, hypopharynx, and larynx are normal. Flexible endoscopy reveals normal appearing nasal mucosa without any signifiacnt nasal polyposis. Her NP was clear of masses. BOT and larynx did not show any concerning findings. Some erythema to the posterior pharyngeal wall.     Impression today is pharyngitis. I have recommended that she continue symptomatic management of her symptoms is it will likely resolve with time. I provided her with magic mouthwash to assist with her tender throat.     Thank you for allowing us to participate in the care of your patient. We will continue to keep you informed of her progress.    Sincerely yours,    Jersey Mclaughlin MD      Objective     Physical Examination  Vitals -  weight is 144 kg (317 lb 7.4 oz) (abnormal). Her blood pressure is 146/84 (abnormal) and her pulse is 78.   Constitutional - General appearance: Normal. Ability to communicate: Normal.  Head & Face - Overall appearance, scars, masses: Normal. Palpation &/or percussion of face: Normal. Salivary glands: Normal. Facial strength: Normal  ENMT -  Otoscopic exam: Normal. Assessment of hearing: Normal. External inspection: Normal. Nasal mucosa, septum, turbinates: Abnormal see exam details. Lips, teeth, gums: Normal. Oropharynx: Normal. Pharyngeal walls/pyriform sinus: Normal. Larynx: Normal. Nasopharynx: Normal  Neck - Neck: Normal. Thyroid: Normal  Lymphatic - Palpation of lymph nodes: Normal  Eyes - Ocular mobility: Normal  Respiratory - Inspection of Chest: Normal  Cardiovascular - Peripheral vascular system: Normal  Neurological/Psychiatric - Orientation: Normal    Answers for HPI/ROS submitted by the patient on 1/20/2022  Fatigue (Tiredness)?: Yes  fever: Yes  chills: Yes  appetite change : Yes  sinus pressure : Yes  nosebleeds: Yes  sore throat: Yes  trouble swallowing: Yes  Voice Change?: Yes  None of these : Yes  Snoring?: Yes  Sleep Apnea?: Yes  cough: Yes  None of these : Yes  None of these: Yes  None of these: Yes  None of these: Yes  None of these : Yes  Food Allergies?: Yes  Seasonal Allergies?: Yes  Hot all of the time? : Yes  dizziness: Yes  headaches: Yes  seizures: Yes  tremors: Yes  Light-headedness: Yes  Bruises or bleeds easily: Yes  Feeling depressed?: Yes  nervous/ anxious: Yes  sleep disturbance: Yes    Review of Systems  A complete review of systems was obtained 01/21/2022 and reviewed.  The review of systems is negative for symptoms except as described above.    BP (!) 146/84 (Patient Position: Sitting)   Pulse 78   Wt (!) 144 kg (317 lb 7.4 oz)   LMP 02/04/2016 (Approximate)   BMI 58.06 kg/m²        Flexible Fiberoptic Laryngoscopy     Indication:  Globus sensation    Flexible fiberoptic laryngoscopy was performed to further evaluate the larynx, nasopharynx and hypopharynx.  She did not tolerate mirror examination.  Informed consent was obtained prior to proceeding.  The nasal cavity was decongested with topical 1% phenylephrine and anesthetized with 4% lidocaine.  A flexible fiberoptic laryngoscope was then advanced into the  patients nasal cavity.    Findings:      Nasopharynx:  no nasopharyngeal mass   Base of Tongue:  minimal lingual tonsils   Hypopharynx:  no post-cricoid edema   no hypopharyngeal mass   Larynx:  normal epiglottis   normal false vocal folds     normal true vocal folds   normal vocal fold mobility   no arytenoid erythema   no laryngeal mass     The patient tolerated the procedure well.                            Data Reviewed    WBC (K/uL)   Date Value   12/10/2021 4.96     Eosinophil % (%)   Date Value   12/10/2021 0.2     Eos # (K/uL)   Date Value   12/10/2021 0.0     Platelets (K/uL)   Date Value   12/10/2021 237     Glucose (mg/dL)   Date Value   12/10/2021 114 (H)     No results found for: IGE    No sinus imaging available.

## 2022-01-26 ENCOUNTER — TELEPHONE (OUTPATIENT)
Dept: NEUROLOGY | Facility: CLINIC | Age: 57
End: 2022-01-26
Payer: MEDICARE

## 2022-01-26 DIAGNOSIS — G43.711 CHRONIC MIGRAINE WITHOUT AURA, WITH INTRACTABLE MIGRAINE, SO STATED, WITH STATUS MIGRAINOSUS: Primary | ICD-10-CM

## 2022-01-26 NOTE — PROGRESS NOTES
2nd attempt for social work follow up call.  Pt answered the phone and reported that she had a terrible sore throat and asked this  to call back at another date and time.

## 2022-01-26 NOTE — TELEPHONE ENCOUNTER
----- Message from Ulisses Calix MA sent at 1/25/2022  5:06 PM CST -----    ----- Message -----  From: Perla Alfaro  Sent: 1/25/2022  11:30 AM CST  To: [facility] Staff    .Type:  Patient Call Back    Who Called: PT       Does the patient know what this is regarding?: BOTOX INJECTION     Would the patient rather a call back YES     Best Call Back Number: [phone]    Additional Information: Thank You

## 2022-01-26 NOTE — TELEPHONE ENCOUNTER
----- Message from Juana Milligan sent at 1/26/2022  1:21 PM CST -----  Who Called: PUSHPA KEY    What is the reqeust in detail: Pt called in to have botox injection scheduled. Please advise.     Can the clinic reply by [facility]? No     Best Call Back Number: [phone]    Additional Information:

## 2022-01-31 ENCOUNTER — EXTERNAL CHRONIC CARE MANAGEMENT (OUTPATIENT)
Dept: PRIMARY CARE CLINIC | Facility: CLINIC | Age: 57
End: 2022-01-31
Payer: MEDICARE

## 2022-01-31 PROCEDURE — 99490 CHRNC CARE MGMT STAFF 1ST 20: CPT | Mod: S$PBB,,, | Performed by: INTERNAL MEDICINE

## 2022-01-31 PROCEDURE — 99490 CHRNC CARE MGMT STAFF 1ST 20: CPT | Mod: PBBFAC | Performed by: INTERNAL MEDICINE

## 2022-01-31 PROCEDURE — 99490 PR CHRONIC CARE MGMT, 1ST 20 MIN: ICD-10-PCS | Mod: S$PBB,,, | Performed by: INTERNAL MEDICINE

## 2022-02-01 ENCOUNTER — OUTPATIENT CASE MANAGEMENT (OUTPATIENT)
Dept: ADMINISTRATIVE | Facility: OTHER | Age: 57
End: 2022-02-01
Payer: MEDICARE

## 2022-02-01 NOTE — PROGRESS NOTES
Outpatient Care Management  Plan of Care Follow Up Visit    Patient: Amna Chawla  MRN: [id number]  Date of Service: 02/01/2022  Completed by: Julissa Hnad RN  Referral Date: 12/10/2021  Program: Case Management (High Risk)    Reason for Visit   Patient presents with    OPCM Chart Review       Brief Summary:     [facility] at next encounter in no new needs      Next steps from previous encounter  Fu on contact from lcsw re resources for eye glasses  Review educational information on hypertension  Best number to contact pt   [phone]  Did pt do what they agreed on :  To purchase a wrist bp device      Reviewed with pt guidelines to prevent acquiring covid 19  Wash hands often (for 20 seconds singing Happy Birthday), cover your cough or sneeze into your elbow or a tissue, stay away from people who are sick, don't touch your eyes, nose or mouth with unwashed hands. Provided Instruction to stay home as directed by local government officials and only make trips that are of essential needs     Interventions  Chart reviewed  Reviewed upcoming appt schedule        Assess/review short term goals met       Patient agrees to follow up call in ...      Next steps  Case closure        Patient Summary     Involvement of Care:  Do I have permission to speak with other family members about your care?       Patient Reported Labs & Vitals:  1.  Any Patient Reported Labs & Vitals?     2.  Patient Reported Blood Pressure:     3.  Patient Reported Pulse:     4.  Patient Reported Weight (Kg):     5.  Patient Reported Blood Glucose (mg/dl):       Medical and social history was reviewed with patient and/or caregiver.     Clinical Assessment     Reviewed and provided basic information on available community resources for mental health, transportation, wellness resources, and palliative care programs with patient and/or caregiver.     Complex Care Plan     Care plan was discussed and completed today  with input from patient and/or caregiver.    Patient Instructions     Instructions were provided via the [email or web address] patient resources and are available for the patient to view on the patient portal.      Todays OPCM Self-Management Care Plan was developed with the patients/caregivers input and was based on identified barriers from todays assessment.  Goals were written today with the patient/caregiver and the patient has agreed to work towards these goals to improve his/her overall well-being. Patient verbalized understanding of the care plan, goals, and all of today's instructions. Encouraged patient/caregiver to communicate with his/her physician and health care team about health conditions and the treatment plan.  Provided my contact information today and encouraged patient/caregiver to call me with any questions as needed.

## 2022-02-02 NOTE — PROGRESS NOTES
Case closed on this date due to inability to make contact with pt.  Notified OPCM RN re: case closure.

## 2022-02-04 ENCOUNTER — TELEPHONE (OUTPATIENT)
Dept: CARDIOLOGY | Facility: HOSPITAL | Age: 57
End: 2022-02-04
Payer: MEDICARE

## 2022-02-08 ENCOUNTER — HOSPITAL ENCOUNTER (OUTPATIENT)
Dept: CARDIOLOGY | Facility: HOSPITAL | Age: 57
Discharge: HOME | End: 2022-02-08
Attending: INTERNAL MEDICINE
Payer: MEDICARE

## 2022-02-08 VITALS
BODY MASS INDEX: 53.92 KG/M2 | HEART RATE: 77 BPM | SYSTOLIC BLOOD PRESSURE: 150 MMHG | WEIGHT: 293 LBS | HEIGHT: 62 IN | DIASTOLIC BLOOD PRESSURE: 84 MMHG

## 2022-02-08 DIAGNOSIS — R07.9 CHEST PAIN: ICD-10-CM

## 2022-02-08 LAB
CFR FLOW - ANTERIOR: 2.23
CFR FLOW - INFERIOR: 2.02
CFR FLOW - LATERAL: 2.14
CFR FLOW - MAX: 2.98
CFR FLOW - MIN: 1.67
CFR FLOW - SEPTAL: 2.35
CFR FLOW - WHOLE HEART: 2.19
CV STRESS BASE HR: 77 BPM
DIASTOLIC BLOOD PRESSURE: 84 MMHG
EJECTION FRACTION- HIGH: 65 %
END DIASTOLIC INDEX-HIGH: 153 ML/M2
END DIASTOLIC INDEX-LOW: 93 ML/M2
END SYSTOLIC INDEX-HIGH: 71 ML/M2
END SYSTOLIC INDEX-LOW: 31 ML/M2
NUC REST DIASTOLIC VOLUME INDEX: 143
NUC REST EJECTION FRACTION: 53
NUC REST SYSTOLIC VOLUME INDEX: 67
NUC STRESS DIASTOLIC VOLUME INDEX: 139
NUC STRESS EJECTION FRACTION: 55 %
NUC STRESS SYSTOLIC VOLUME INDEX: 62
OHS CV CPX 85 PERCENT MAX PREDICTED HEART RATE MALE: 134
OHS CV CPX MAX PREDICTED HEART RATE: 158
OHS CV CPX PATIENT IS FEMALE: 1
OHS CV CPX PATIENT IS MALE: 0
OHS CV CPX PEAK DIASTOLIC BLOOD PRESSURE: 95 MMHG
OHS CV CPX PEAK HEAR RATE: 80 BPM
OHS CV CPX PEAK RATE PRESSURE PRODUCT: NORMAL
OHS CV CPX PEAK SYSTOLIC BLOOD PRESSURE: 164 MMHG
OHS CV CPX PERCENT MAX PREDICTED HEART RATE ACHIEVED: 51
OHS CV CPX RATE PRESSURE PRODUCT PRESENTING: NORMAL
REST FLOW - ANTERIOR: 0.9 CC/MIN/G
REST FLOW - INFERIOR: 1.21 CC/MIN/G
REST FLOW - LATERAL: 1.09 CC/MIN/G
REST FLOW - MAX: 1.46 CC/MIN/G
REST FLOW - MIN: 0.53 CC/MIN/G
REST FLOW - SEPTAL: 0.89 CC/MIN/G
REST FLOW - WHOLE HEART: 1.02 CC/MIN/G
RETIRED EF AND QEF - SEE NOTES: 53 %
STRESS FLOW - ANTERIOR: 2 CC/MIN/G
STRESS FLOW - INFERIOR: 2.44 CC/MIN/G
STRESS FLOW - LATERAL: 2.33 CC/MIN/G
STRESS FLOW - MAX: 2.82 CC/MIN/G
STRESS FLOW - MIN: 1.27 CC/MIN/G
STRESS FLOW - SEPTAL: 2.07 CC/MIN/G
STRESS FLOW - WHOLE HEART: 2.21 CC/MIN/G
SYSTOLIC BLOOD PRESSURE: 150 MMHG

## 2022-02-08 PROCEDURE — 93016 CV STRESS TEST SUPVJ ONLY: CPT | Mod: ,,, | Performed by: INTERNAL MEDICINE

## 2022-02-08 PROCEDURE — 78434 AQMBF PET REST & RX STRESS: CPT | Mod: 26,,, | Performed by: INTERNAL MEDICINE

## 2022-02-08 PROCEDURE — 78434 CARDIAC PET SCAN STRESS (CUPID ONLY): ICD-10-PCS | Mod: 26,,, | Performed by: INTERNAL MEDICINE

## 2022-02-08 PROCEDURE — 93018 CV STRESS TEST I&R ONLY: CPT | Mod: ,,, | Performed by: INTERNAL MEDICINE

## 2022-02-08 PROCEDURE — 78431 CARDIAC PET SCAN STRESS (CUPID ONLY): ICD-10-PCS | Mod: 26,,, | Performed by: INTERNAL MEDICINE

## 2022-02-08 PROCEDURE — 78434 AQMBF PET REST & RX STRESS: CPT

## 2022-02-08 PROCEDURE — 78431 MYOCRD IMG PET RST&STRS CT: CPT

## 2022-02-08 PROCEDURE — 63600175 PHARM REV CODE 636 W HCPCS: Performed by: INTERNAL MEDICINE

## 2022-02-08 PROCEDURE — 93018 CARDIAC PET SCAN STRESS (CUPID ONLY): ICD-10-PCS | Mod: ,,, | Performed by: INTERNAL MEDICINE

## 2022-02-08 PROCEDURE — 93016 CARDIAC PET SCAN STRESS (CUPID ONLY): ICD-10-PCS | Mod: ,,, | Performed by: INTERNAL MEDICINE

## 2022-02-08 PROCEDURE — 78431 MYOCRD IMG PET RST&STRS CT: CPT | Mod: 26,,, | Performed by: INTERNAL MEDICINE

## 2022-02-08 RX ORDER — AMINOPHYLLINE 25 MG/ML
75 INJECTION, SOLUTION INTRAVENOUS ONCE
Status: COMPLETED | OUTPATIENT
Start: 2022-02-08 | End: 2022-02-08

## 2022-02-08 RX ORDER — DIPYRIDAMOLE 5 MG/ML
60 INJECTION INTRAVENOUS ONCE
Status: COMPLETED | OUTPATIENT
Start: 2022-02-08 | End: 2022-02-08

## 2022-02-08 RX ADMIN — AMINOPHYLLINE 75 MG: 25 INJECTION, SOLUTION INTRAVENOUS at 10:02

## 2022-02-08 RX ADMIN — DIPYRIDAMOLE 60 MG: 5 INJECTION INTRAVENOUS at 10:02

## 2022-02-11 NOTE — TELEPHONE ENCOUNTER
----- Message from Traci Martinez sent at 2/11/2022  4:45 PM CST -----  Regarding: refill  The pt is calling to get a refill on her rivaroxaban (XARELTO) 20 mg Tab and send it to [id number] - [place], [place] - [address] AT [facility]   Phone:  [phone]  Fax:  [phone]. Thanks, Traci

## 2022-02-13 ENCOUNTER — PATIENT MESSAGE (OUTPATIENT)
Dept: ADMINISTRATIVE | Facility: OTHER | Age: 57
End: 2022-02-13
Payer: MEDICARE

## 2022-02-15 ENCOUNTER — OFFICE VISIT (OUTPATIENT)
Dept: CARDIOLOGY | Facility: CLINIC | Age: 57
End: 2022-02-15
Payer: MEDICARE

## 2022-02-15 VITALS
DIASTOLIC BLOOD PRESSURE: 84 MMHG | OXYGEN SATURATION: 99 % | HEART RATE: 79 BPM | HEIGHT: 64 IN | SYSTOLIC BLOOD PRESSURE: 152 MMHG | WEIGHT: 293 LBS | BODY MASS INDEX: 50.02 KG/M2

## 2022-02-15 DIAGNOSIS — Z95.810 BIVENTRICULAR AUTOMATIC IMPLANTABLE CARDIOVERTER DEFIBRILLATOR IN SITU: ICD-10-CM

## 2022-02-15 DIAGNOSIS — I48.0 PAROXYSMAL ATRIAL FIBRILLATION: Primary | ICD-10-CM

## 2022-02-15 DIAGNOSIS — R07.89 NON-CARDIAC CHEST PAIN: ICD-10-CM

## 2022-02-15 DIAGNOSIS — I10 ESSENTIAL HYPERTENSION: ICD-10-CM

## 2022-02-15 DIAGNOSIS — E66.01 MORBID OBESITY: ICD-10-CM

## 2022-02-15 DIAGNOSIS — G47.33 OBSTRUCTIVE SLEEP APNEA: ICD-10-CM

## 2022-02-15 DIAGNOSIS — Z86.73 HISTORY OF CVA (CEREBROVASCULAR ACCIDENT): ICD-10-CM

## 2022-02-15 PROCEDURE — 99215 OFFICE O/P EST HI 40 MIN: CPT | Mod: PBBFAC | Performed by: INTERNAL MEDICINE

## 2022-02-15 PROCEDURE — 99999 PR PBB SHADOW E&M-EST. PATIENT-LVL V: CPT | Mod: PBBFAC,GC,, | Performed by: INTERNAL MEDICINE

## 2022-02-15 PROCEDURE — 99214 OFFICE O/P EST MOD 30 MIN: CPT | Mod: S$PBB,GC,, | Performed by: INTERNAL MEDICINE

## 2022-02-15 PROCEDURE — 99214 PR OFFICE/OUTPT VISIT, EST, LEVL IV, 30-39 MIN: ICD-10-PCS | Mod: S$PBB,GC,, | Performed by: INTERNAL MEDICINE

## 2022-02-15 PROCEDURE — 99999 PR PBB SHADOW E&M-EST. PATIENT-LVL V: ICD-10-PCS | Mod: PBBFAC,GC,, | Performed by: INTERNAL MEDICINE

## 2022-02-15 RX ORDER — CLOPIDOGREL BISULFATE 75 MG/1
75 TABLET ORAL DAILY
Qty: 90 TABLET | Refills: 3 | Status: SHIPPED | OUTPATIENT
Start: 2022-02-15 | End: 2023-04-19 | Stop reason: SDUPTHER

## 2022-02-15 NOTE — PROGRESS NOTES
PCP - Tiffany Mina MD  Referring Physician:     Subjective:   Patient ID:  Amna Chawla is a 56 y.o. y.o. female with cardiac arrest in 2013 s/p ICD (negative CTA at that time), pAF, CVA, HTN, HLD, morbid obesity (BMI 54), SHIRA here for the follow evaluation. She has seen Dr. Gaspar and Dr. Ceron in the past.     She has no exertional chest pain or heart failure symptoms. She has no palpitations and no claudication. Denies MERCHANT, orthopnea, PND, edema, syncope. She has never smoked, drinks alcohol very seldom. No family history of CAD or SCD. Father  at age 70.      She had experienced chest pain previously that was workup up with stress test that resulted negative for ischemia. She does not have a routine exercise program. Does ADLs independently. She denies any chest symptoms.     History:     Social History     Tobacco Use    Smoking status: Never Smoker    Smokeless tobacco: Never Used   Substance Use Topics    Alcohol use: No     Family History   Problem Relation Age of Onset    Hypertension Mother     COPD Unknown     Heart failure Unknown     Glaucoma Maternal Grandmother     Glaucoma Paternal Grandmother        Meds:     Review of patient's allergies indicates:   Allergen Reactions    Aspirin Hives    Imitrex [sumatriptan] Palpitations    Penicillins Hives and Swelling    Shellfish containing products Anaphylaxis     seafood    Reglan [metoclopramide hcl] Other (See Comments)     Parkinsonism        Current Outpatient Medications:     (Magic mouthwash) 1:1:1 diphenhydramine(Benadryl) 12.5mg/5ml liq, aluminum & magnesium hydroxide-simethicone (Maalox), LIDOcaine viscous 2%, Swish and spit 10 mLs every 4 (four) hours as needed (throat pain). for mouth sores, Disp: 450 mL, Rfl: 0    ARIPiprazole (ABILIFY) 15 MG Tab, Take 1 tablet (15 mg total) by mouth once daily., Disp: 90 tablet, Rfl: 1    blood sugar diagnostic Strp, 1 strip by Misc.(Non-Drug; Combo Route) route 2 (two) times  daily., Disp: 200 strip, Rfl: 3    blood-glucose meter kit, Please provide with insurance covered meter, Disp: 1 each, Rfl: 0    carvediloL (COREG) 25 MG tablet, TAKE 1 TABLET(25 MG) BY MOUTH TWICE DAILY WITH MEALS, Disp: 60 tablet, Rfl: 11    clonazePAM (KLONOPIN) 1 MG tablet, Take 1 tablet (1 mg total) by mouth daily as needed for Anxiety., Disp: 20 tablet, Rfl: 3    clopidogreL (PLAVIX) 75 mg tablet, Take 75 mg by mouth once daily., Disp: , Rfl:     cyanocobalamin, vitamin B-12, 1,000 mcg Subl, Place 1,000 mcg under the tongue once daily., Disp: 90 tablet, Rfl: 3    diclofenac sodium (VOLTAREN) 1 % Gel, Apply 2 g topically 4 (four) times daily., Disp: 100 g, Rfl: 1    donepezil (ARICEPT) 10 MG tablet, Take 1 tablet (10 mg total) by mouth 2 (two) times daily., Disp: 180 tablet, Rfl: 3    EPINEPHrine (EPIPEN) 0.3 mg/0.3 mL AtIn, Inject 0.3 mLs (0.3 mg total) into the muscle once. for 1 dose, Disp: 0.3 mL, Rfl: 0    ergocalciferol (ERGOCALCIFEROL) 50,000 unit Cap, Take 1 capsule (50,000 Units total) by mouth twice a week., Disp: 24 capsule, Rfl: 3    etodolac (LODINE) 500 MG tablet, Take 1 tablet (500 mg total) by mouth 2 (two) times daily as needed (migraine)., Disp: 30 tablet, Rfl: 12    flurbiprofen (ANSAID) 100 MG tablet, Take 1 tablet (100 mg total) by mouth 2 (two) times daily as needed (migraine)., Disp: 12 tablet, Rfl: 12    fluticasone propionate (FLONASE) 50 mcg/actuation nasal spray, 1 spray (50 mcg total) by Each Nostril route once daily., Disp: 16 g, Rfl: 3    fremanezumab-vfrm (AJOVY) 225 mg/1.5 mL injection, Inject 1.5 mLs (225 mg total) into the skin every 28 days., Disp: 1 Syringe, Rfl: 12    furosemide (LASIX) 20 MG tablet, Take 1 tablet (20 mg total) by mouth once daily., Disp: 30 tablet, Rfl: 6    gabapentin (NEURONTIN) 300 MG capsule, Take 3 capsules (900 mg total) by mouth 3 (three) times daily., Disp: 810 capsule, Rfl: 12    lancets Misc, 1 Device by Misc.(Non-Drug; Combo  Route) route 2 (two) times daily., Disp: 200 each, Rfl: 3    losartan (COZAAR) 100 MG tablet, Take 1 tablet (100 mg total) by mouth once daily., Disp: 90 tablet, Rfl: 3    magnesium 200 mg Tab, Take 200 mg by mouth once daily. (Patient taking differently: Take 200 mg by mouth every other day.), Disp: 30 each, Rfl: 12    metFORMIN (GLUCOPHAGE) 1000 MG tablet, Take 1 tablet (1,000 mg total) by mouth 2 (two) times daily with meals., Disp: 180 tablet, Rfl: 3    mupirocin (BACTROBAN) 2 % ointment, Apply to affected area 3 times daily, Disp: 22 g, Rfl: 1    ondansetron (ZOFRAN-ODT) 4 MG TbDL, Take 1 tablet (4 mg total) by mouth every 12 (twelve) hours as needed (nausea)., Disp: 40 tablet, Rfl: 12    ondansetron (ZOFRAN-ODT) 4 MG TbDL, Take 1 tablet (4 mg total) by mouth every 12 (twelve) hours as needed (nausea)., Disp: 40 tablet, Rfl: 12    pantoprazole (PROTONIX) 40 MG tablet, TAKE 1 TABLET(40 MG) BY MOUTH EVERY DAY, Disp: 90 tablet, Rfl: 3    predniSONE (DELTASONE) 10 MG tablet, 3 tablets daily x 2 days, 2 tablets daily x 2 days, 1 tablet daily x 2 days, Disp: 12 tablet, Rfl: 0    rivaroxaban (XARELTO) 20 mg Tab, TAKE 1 TABLET BY MOUTH DAILY EVENING MEAL WITH DINNER, Disp: 30 tablet, Rfl: 11    rosuvastatin (CRESTOR) 40 MG Tab, TAKE 1 TABLET(40 MG) BY MOUTH EVERY EVENING, Disp: 90 tablet, Rfl: 3    sertraline (ZOLOFT) 100 MG tablet, Take 0.5 tablet by mouth daily x 5 days then increase to 1 tablet daily, Disp: 90 tablet, Rfl: 3    silver sulfADIAZINE 1% (SILVADENE) 1 % cream, Apply topically 2 (two) times daily., Disp: 50 g, Rfl: 0    tiaGABine (GABITRIL) 4 MG tablet, Take 1 tablet (4 mg total) by mouth every evening., Disp: 30 tablet, Rfl: 12    tiZANidine (ZANAFLEX) 4 MG tablet, TAKE 1 TABLET(4 MG) BY MOUTH EVERY 6 HOURS AS NEEDED, Disp: 90 tablet, Rfl: 12    tiZANidine (ZANAFLEX) 4 MG tablet, Take 1 tablet (4 mg total) by mouth every 6 (six) hours as needed., Disp: 90 tablet, Rfl: 12    tiZANidine  (ZANAFLEX) 4 MG tablet, Take 1 tablet (4 mg total) by mouth every 6 (six) hours as needed., Disp: 90 tablet, Rfl: 12    TRUE METRIX GLUCOSE METER Misc, TEST BG BID, Disp: , Rfl: 0    ubrogepant (UBROGEPANT) 100 mg tablet, Take 1 tablet (100 mg total) by mouth 2 (two) times daily as needed for Migraine., Disp: 10 tablet, Rfl: 12    zolpidem (AMBIEN) 10 mg Tab, Take 1 tablet (10 mg total) by mouth nightly as needed (insomnia)., Disp: 30 tablet, Rfl: 5    Current Facility-Administered Medications:     onabotulinumtoxina injection 200 Units, 200 Units, Intramuscular, Q90 Days, David Cortez MD, 200 Units at 10/19/18 1713    onabotulinumtoxina injection 200 Units, 200 Units, Intramuscular, Q90 Days, David Cortez MD, 200 Units at 12/28/18 1106    onabotulinumtoxina injection 200 Units, 200 Units, Intramuscular, Q90 Days, David Cortez MD, 200 Units at 03/13/19 1504    onabotulinumtoxina injection 200 Units, 200 Units, Intramuscular, Q90 Days, David Cortez MD, 200 Units at 05/23/19 1123    onabotulinumtoxina injection 200 Units, 200 Units, Intramuscular, Q90 Days, David Cortez MD, 200 Units at 10/11/19 1112    onabotulinumtoxina injection 200 Units, 200 Units, Intramuscular, Q90 Days, David Cortez MD, 200 Units at 12/19/19 1036    onabotulinumtoxina injection 200 Units, 200 Units, Intramuscular, Q10 weeks, David Cortez MD, 200 Units at 03/10/20 1036    onabotulinumtoxina injection 200 Units, 200 Units, Intramuscular, Q90 Days, David Cortez MD, 200 Units at 06/26/20 1538    onabotulinumtoxina injection 200 Units, 200 Units, Intramuscular, q12 weeks, David Cortez MD, 200 Units at 07/06/21 1059    onabotulinumtoxina injection 200 Units, 200 Units, Intramuscular, q12 weeks, David Cortez MD, 200 Units at 12/10/21 1112    Facility-Administered Medications Ordered in Other Visits:     lactated ringers infusion, , Intravenous, Continuous, Humberto Angel MD, Stopped at 02/11/20 0801    lidocaine  (PF) 10 mg/ml (1%) injection 5 mg, 0.5 mL, Intradermal, Once, Humberto Angel MD    Review of Systems   Constitutional: Negative for chills, fever, malaise/fatigue and weight loss.   HENT: Negative for ear pain, hearing loss and tinnitus.    Respiratory: Negative for cough, hemoptysis, sputum production and shortness of breath.    Cardiovascular: Negative for chest pain, palpitations, orthopnea, claudication, leg swelling and PND.   Gastrointestinal: Negative for abdominal pain, diarrhea, heartburn, nausea and vomiting.   Genitourinary: Negative for dysuria and urgency.   Musculoskeletal: Negative for back pain, myalgias and neck pain.   Neurological: Negative for dizziness and headaches.   Psychiatric/Behavioral: Negative for depression.       Objective:   LMP 02/04/2016 (Approximate)   Physical Exam  Constitutional:       Appearance: Normal appearance.   Cardiovascular:      Rate and Rhythm: Normal rate and regular rhythm.      Pulses: Normal pulses.           Carotid pulses are 2+ on the right side and 2+ on the left side.       Radial pulses are 2+ on the right side and 2+ on the left side.        Femoral pulses are 2+ on the right side and 2+ on the left side.       Popliteal pulses are 2+ on the right side and 2+ on the left side.        Dorsalis pedis pulses are 2+ on the right side and 2+ on the left side.        Posterior tibial pulses are 2+ on the right side and 2+ on the left side.      Heart sounds: No murmur heard.      Pulmonary:      Effort: Pulmonary effort is normal. No respiratory distress.      Breath sounds: No stridor. No wheezing.   Abdominal:      General: Abdomen is flat. Bowel sounds are normal. There is no distension.      Palpations: Abdomen is soft.   Musculoskeletal:         General: No swelling. Normal range of motion.   Skin:     General: Skin is warm and dry.      Capillary Refill: Capillary refill takes less than 2 seconds.   Neurological:      General: No focal deficit present.       Mental Status: She is alert and oriented to person, place, and time.         Labs:     Lab Results   Component Value Date     12/10/2021    K 4.3 12/10/2021     12/10/2021    CO2 23 12/10/2021    BUN 10 12/10/2021    CREATININE 0.9 12/10/2021    ANIONGAP 12 12/10/2021     Lab Results   Component Value Date    HGBA1C 6.2 (H) 10/05/2021     Lab Results   Component Value Date    BNP <10 12/10/2021    BNP 17 11/01/2020    BNP 11 02/02/2014       Lab Results   Component Value Date    WBC 4.96 12/10/2021    HGB 11.4 (L) 12/10/2021    HCT 34.6 (L) 12/10/2021     12/10/2021    GRAN 2.6 12/10/2021    GRAN 52.4 12/10/2021     Lab Results   Component Value Date    CHOL 119 (L) 10/05/2021    HDL 36 (L) 10/05/2021    LDLCALC 70.4 10/05/2021    TRIG 63 10/05/2021       Lab Results   Component Value Date     12/10/2021    K 4.3 12/10/2021     12/10/2021    CO2 23 12/10/2021    BUN 10 12/10/2021    CREATININE 0.9 12/10/2021    ANIONGAP 12 12/10/2021     Lab Results   Component Value Date    HGBA1C 6.2 (H) 10/05/2021     Lab Results   Component Value Date    BNP <10 12/10/2021    BNP 17 11/01/2020    BNP 11 02/02/2014    Lab Results   Component Value Date    WBC 4.96 12/10/2021    HGB 11.4 (L) 12/10/2021    HCT 34.6 (L) 12/10/2021     12/10/2021    GRAN 2.6 12/10/2021    GRAN 52.4 12/10/2021     Lab Results   Component Value Date    CHOL 119 (L) 10/05/2021    HDL 36 (L) 10/05/2021    LDLCALC 70.4 10/05/2021    TRIG 63 10/05/2021                Cardiovascular Imaging:     Echo:   EF   Date Value Ref Range Status   12/27/2021 58 % Final     Nuc Stress EF   Date Value Ref Range Status   02/08/2022 55 % Final     Nuc Rest EF   Date Value Ref Range Status   02/08/2022 53  Final       PET stress test 2022:     The relative PET images show no clinically significant regional resting or stress induced perfusion abnormalities.    The whole heart absolute myocardial perfusion values averaged 1.02  cc/min/g at rest, which is elevated; 2.21 cc/min/g at stress, which is normal; and CFR is 2.19 , which is mildly reduced in part due to elevated resting flow.    CT attenuation images demonstrate no coronary calcifications and no aortic calcifications.    The gated perfusion images showed an ejection fraction of 53% at rest and 55% during stress. A normal ejection fraction is greater than 53%.    The wall motion is normal at rest and during stress.    The LV cavity size is normal at rest and stress.    The EKG portion of this study is uninterpretable.    There were no arrhythmias during stress.    The patient reported no chest pain during the stress test.    When compared to the previous study from 9/15/2020, myocardial stress flows and CFR are much improved on the current study and similar to her study in 2017.    Assessment & Plan:     1. Paroxysmal atrial fibrillation    2. Essential hypertension    3. Biventricular automatic implantable cardioverter defibrillator in situ    4. Obstructive sleep apnea    5. Morbid obesity    6. Non-cardiac chest pain    7. History of CVA (cerebrovascular accident)      Non-cardiac chest pain   She has a negative stress test after a recent visit to the ER for chest pain. Further, she has no exertional symptoms. She is concerned about getting covid virus and avoids crowds. Encouraged to increase activities by walking.    PAF  Continue Carvedilol, Rivaroxaban  Managed by EP  Her ICD is operating normally     HTN - controlled  Enrolled in digital hypertension program  Continue present treatment     HLD  LDL 70 (2021)     SHIRA  CPAP recalled  Referral already sent to sleep medicine who recommended to continue using her CPAP until [facility] contacts her for device exchange.      Morbid obesity  We discussed diet and lifestyle modification     History of CVA  Continue Plavix per neurology  Renewed prescription    RTC PRN    Signed:  Alok Grossman M.D.  Cardiovascular  Fellow  [facility]

## 2022-02-17 ENCOUNTER — PROCEDURE VISIT (OUTPATIENT)
Dept: NEUROLOGY | Facility: CLINIC | Age: 57
End: 2022-02-17
Payer: MEDICARE

## 2022-02-17 ENCOUNTER — TELEPHONE (OUTPATIENT)
Dept: NEUROLOGY | Facility: CLINIC | Age: 57
End: 2022-02-17
Payer: MEDICARE

## 2022-02-17 VITALS
SYSTOLIC BLOOD PRESSURE: 146 MMHG | HEART RATE: 82 BPM | DIASTOLIC BLOOD PRESSURE: 90 MMHG | HEIGHT: 64 IN | WEIGHT: 293 LBS | BODY MASS INDEX: 50.02 KG/M2

## 2022-02-17 DIAGNOSIS — M54.31 BILATERAL SCIATICA: Primary | ICD-10-CM

## 2022-02-17 DIAGNOSIS — M54.32 BILATERAL SCIATICA: Primary | ICD-10-CM

## 2022-02-17 DIAGNOSIS — G43.711 CHRONIC MIGRAINE WITHOUT AURA, WITH INTRACTABLE MIGRAINE, SO STATED, WITH STATUS MIGRAINOSUS: ICD-10-CM

## 2022-02-17 PROCEDURE — 99499 NO LOS: ICD-10-PCS | Mod: S$PBB,,, | Performed by: PSYCHIATRY & NEUROLOGY

## 2022-02-17 PROCEDURE — 64615 CHEMODENERV MUSC MIGRAINE: CPT | Mod: PBBFAC | Performed by: PSYCHIATRY & NEUROLOGY

## 2022-02-17 PROCEDURE — 99499 UNLISTED E&M SERVICE: CPT | Mod: S$PBB,,, | Performed by: PSYCHIATRY & NEUROLOGY

## 2022-02-17 PROCEDURE — 64615 CHEMODENERV MUSC MIGRAINE: CPT | Mod: S$PBB,,, | Performed by: PSYCHIATRY & NEUROLOGY

## 2022-02-17 PROCEDURE — 64615 PR CHEMODENERVATION OF MUSCLE FOR CHRONIC MIGRAINE: ICD-10-PCS | Mod: S$PBB,,, | Performed by: PSYCHIATRY & NEUROLOGY

## 2022-02-17 RX ADMIN — ONABOTULINUMTOXINA 200 UNITS: 100 INJECTION, POWDER, LYOPHILIZED, FOR SOLUTION INTRADERMAL; INTRAMUSCULAR at 04:02

## 2022-02-17 NOTE — TELEPHONE ENCOUNTER
----- Message from Hamida Lozada sent at 2/17/2022  3:57 PM CST -----  Contact: Patient  Type: Patient Call Back         Who called: Patient         What is the request in detail: states running late for 4pm appt; states she will be not later than 4:20; appt 60mins in length for botox; please advise             Best call back number: [phone] (home)          Additional Information:            Thank You    
I think [facility] has [facility].  
Detail Level: Zone

## 2022-02-17 NOTE — PROCEDURES
Follow up:  S/p tonsillitis     Prior note:   S/p course of prednisone for GI allergic reaction (scratch throat - seafood related)     Prior note:   Migraine Hz increased during storm - almost daily   One episode of lightheadedness. No SOB, CP       Prior note:   Reports episode of lightheadedness 2 days ago      Prior note:   S/p COVID vaccine      Prior note:   Reports more physical fatigue   taking 2 naps a day   L sided severe migraine lasted 2 days. Took ubrelvy, motrin, zanaflex      Prior note:   3 days of severe L sided HA + nausea   Gradual onset   Ubrelvy, zanaflex, flurbiprofen - not helping   Vision - nml      Prior note:   HA stable   Try Ubrelvy again      Prior note:   independent left and right parietal ice prick HA      Prior note:   HA much improved   Only 2 since last visit      Prior note:   HA are more frequent   Taking 1600 mg motrin + zanaflex   Went to ER recently      Prior note:   HA overall stable in Hz and intensity   Reports a wearing off effect of BOTOX since last week   Taking zanaflex 8 mg TID        Prior note:   HA started 12/24   She feels BOTOX wore off last week   Reports GI distress from taking to many motrin      Prior note:   4/week HA - L vertex   Jabs - vertex either sides      She reports migraine that woke her up in the morning - associated with L side facial droop      Prior note:   She gets acceptable relief for 2.5 months after BOTOX      Prior note:   No recurrent stroke symptoms   starting having whole body tremors since this AM. Took 1 tablet of lorazepam   Last night had a HA, took trazodone       Admit Date: 4/11/2018  2:03 PM   Discharge Date and Time: 4/12/2018  8:30 PM   History of Present Illness: Ms. Chawla is a 53 yo F with afib on Eliquis (last dose this am), prior cardiac arrest with associated acute ischemic stroke with residual mild L sided weakness, CAD with ICD in situ, HTN, and HLD who presented with acute R sided weakness and sensation changes.  She  originally called EMS for palpitations, but developed acute right sided facial droop and RUE weakness at 1:40pm today, after EMS had arrived.  She denies changes to speech or vision.  SBP en route 200/100.  She is ineligible for tPA 2/2 Eliquis use.  She is not suspected to have an LVO.  She will be admitted to  for observation overnight.     Hospital Course (synopsis of major diagnoses, care, treatment, and services provided during the course of the hospital stay): Ms. Chawla was admitted for acute stroke workup. Pt with pacemaker so unable to obtain MRI Brain; CTA H/N demonstrated no evidence acute infarction, though did exhibit noncalcified plaquing of carotid bifurcations and proximal ICAs with < 50% stenosis, so Plavix was added to pt's daily home regimen. Concerned for TIA at this time though Migraine very high on differential due to pt's hx headaches, including presently this admission. Hypertensive urgency/emergency also considered due to pt's very elevated levels with EMS. Per chart review, Eliquis was a new medication since 4/3/18 (had switched from Coumadin), however staff Faraz concerned that pt had a potential event while on Eliquis. She wished to switch to Pradaxa as an alternative DOAC (as it has an option for reversal), however changed to Xarelto per pt's insurance coverage.   While admitted, pt given cocktail for HA which she has received previously at the infusion clinic - IV Magnesium, IM Toradol, 2mg IV Morphine, 500cc NS bolus (IV Benadryl not included this time as pt had received a dose earlier that day prior to contrast imaging.) HA improved somewhat and pt was encouraged to follow up with Dr. Cortez for continued management of botox injections, etc. At that time, pt also found with hyponatremia; d/c'd Clorthalidone and plan was made for pt to [facility] 4[facility]16[facility]18 for repeat CMP to evaluate Na level and BP check since discontinuing this medication.  Ms. Chawla was  evaluated and treated by PT, OT, and SLP who recommend d/c with outpatient PT and OT. She was discharged home 4/12/18 with [facility] follow-up Monday      Prior note:   2 weeks ago BOTOX effect wore off   Used up all her percocet   3 wks h/o:   Reports frequent falls - falling forward, no LOC, no injuries, able to protect falls by hands   triggers - unknown   Also occ stumbling   Dragging L foot   No Pain in back or legs   No numbness or weakness in legs   No B/B incontinence   No lightheadedness      Prior note:    Flare up of TMJ and HA during daughter's wedding   NSAIDS helped   2 weeks ago BOTOX effect wore off      Prior note:   2 weeks ago BOTOX effect wore off   Has severe spasm and pain in the traps catalina   Weather change has provoked severe migraine + nausea   She started coumadin       Prior note:   3 weeks ago BOTOX effect wore off   She reports severe daily HA    During BOTOX periods she feels she  her HA. Much less intense      Prior note:   The patient is a 51-year-old female who [facility]. Since her last visit, she reports growing frustration   about the fact that nothing has helped her with regards to her headaches. She   continues to experience daily headaches. She takes mefenamic acid and   tizanidine every night, which only provides her two hours of relief. She is   unable to sleep because of the refractory headaches. She is incapacitated   because of severe headaches. She reports minimal relief with infusions that she  gets on a periodic basis. She does report an improvement overall with the   Botox. However, this effect has worn off in the last two weeks. She also   reports an episode wherein she fell with loss of consciousness, which was not   provoked. As a result, she injured her ankle and is currently wearing a boot.      Prior note:   since last week - Reports vertigo for 6 - 7 minutes upto 3 times daily   Nearly daily severe  "HA - no response to ponstel , compazine   She is late for her BOTOX - last 2 weeks were painful       Follow up:   R sided Headache is constant max at TMJ on R   Prior note:   She reports new episodes of R face tingling. Sh also reports 2 episodes of blurred vision lasting 15 minutes. These hapended while watching TV. Her pain remains unchanged   Follow up:   2 days of severe HA during Thanksgiving   Pounding bifrontal region   Pain worse over L eye brow   Follow up:   Reports bifrontal severe HA (worse on L) with no nausea with sudden onset . Occurs daily   NO note:  I found no papilledema or other changes to suggest elevated intracranial pressure or other alternative etiology for her headaches. Repeat exam in two years.  HA are less frequent and less intense.   (R levator scapula spasm)   symptoms better with lateral flexion to L   Prior note:   She still has daily HA with severe sharp stabbing pain behind L eye lasting for 15 sec   Prior note:   Daily pain. 2/week - nausea.  Shu Lares RN at 9/17/2014 4:53 PM  Pt presented to clinic for medical advice. Pt is seen by Dr. Paredes and Dr. Cortez.  1) She went ER on 9/13/14 for a migraine. She was given Reglan, Benadryl, MSO4 and IVF. A couple days later she developed an all over body "tremor". She states "I think I have Parkinson's". - Per Dr. Paredes, medication related tremor (Reglan & Benadryl). Informed her it should wear off in a few days. If not, she is to call and let us know.  2) Headaches - triggered by insomnia.   Current meds:  Ketoprofen - she took it once and said her "throat closed up" and she's not supposed to have anything with aspirin in it.  Nortriptyline - she was taking it at night, but it didn't help her sleep, so she stopped it.  Per Dr. Cortez, discontinue Ketoprofen and Nortriptyline. Start Effexor XR 37.5mg QD, tizanidine 4mg BID PRN headache and Klonopin 0.25 - 0.5mg QHS PRN. Will schedule pt for sphenocath procedure within the next " "week.   Prior note:   48 yo woman with history of HTN and asthma, both reportedly controlled, in hospital early 2013 after "passed out" and became unresponsive. CPR in the field for 8 minutes until EMS arrived. Per ED note, on arrival she was found to be in PEA, given epinephrine. Vfib/Vtach was achieved which was shocked x1. Patient converted to sinus tachycardia after shock. I do not know the time course for the above treatment. On arrival to facility patient was in sinus tachycardia with strong pulses, intubated and unresponsive. ET tube placement was confirmed with direct laryngoscopy and good bilateral breath sounds were heard after the tube was pulled back 2 cm. Reported to have "withdrawal" movements in ER during stabilization, then sedated and cooling protocol initiated. Had remarkable   recovery and first seen in clinic in April 2013.   Headache history: cluster   Age of onset - 2012   Location - behind L eye   Nature of pain - sharp   Prodrome - no   Aura - No   Duration of headache - 6-7 min   Time to peak intensity -   Pain scale - range of intensity - 9/10   Frequency - daily   Status Migrainosus history - 1-3 days   Time of day of most headaches- anytime   Associated symptoms with the headache:   Red eyes   swelling of L face   Headache history: Migraine   Age of onset - 2012   Location - bifrontal   Nature of pain - Pounding   Prodrome - no   Aura - No   Duration of headache - > 4 hrs   Time to peak intensity -   Pain scale - range of intensity - 9/10   Frequency - 5/week   Status Migrainosus history - 1-3 days   Time of day of most headaches- anytime   Associated symptoms with the headache:   Meningeal symptoms - photophobia, phonophobia, exercise intolerance +   Nausea/vomitting +   Nasal drainage   Visual blurriness +   Pallor/flushing   Dizziness   Vertigo   Confusion   Impaired concentration +   Pain worsened with physical activity +   Neck pain +   Symptoms of increased intracranial pressure: " "  Whooshing sounds - no   Visual spots/blotches - no   Headache Triggers: unknown   Other comorbid conditions:   Anxiety - no   Motion sickness symptom - no       Treatment history:   Resolution of headache with sleep - yes       Meds tried:   Trigger points involvin/9/15 helped for 1 day   Site: Right   Levator scapula   Pharmacological agent:   0.5% Sensorcaine solution   No complications were noted.  Supraorbital block - helped for 2 days   Tylenol - not help   imitrex - chest tightness   alleve - help   topamax - not helping   Neurontin - not helping   Paxil, Elavil - not help   seroquel - nightmare   Ketoprofen - she took it once and said her "throat closed up" and she's not supposed to have anything with aspirin in it.  Flurbiprofen - constipation   Nortriptyline - she was taking it at night, but it didn't help her sleep, so she stopped it.  reglan - parkinsonism   zanaflex - help   Toradol 30 mg IM inj at home - too expensive   BOTOX round #1 9/3/15 - helped (R levator scapula spasm)   Round #2 12/3/15 - helped   Round#3 3/316 - helped   Round #4 16 - helped   BOTOX#5 9/15/16 - helped     BOTOX#6 - 16 - helped   BOTOX#7 - 3/9/17 - helped    BOTOX#8 - 17 - helped    BOTOX#9 8/10/17 - helped   BOTOX#10 10/19/17 - helped   BOTOX#11 17 - helped   BOTOX#12 3/7/18 - helped   BOTOX#13 18 - helped    BOTOX#14 18 - helped     BOTOX#15 10/19/18 - helped      BOTOX#16 18 - helped   BOTOX#17 3/13/19 - helped    BOTOX#18 19 - helped     BOTOX#19 19 - helped      BOTOX#20 10/11/19 - helped     BOTOX#21 19 - helped   BOTOX#22 3/10/20 - helped    BOTOX#23 20 - helped   BOTOX#24 20 - helped  BOTOX#25 21 - helped   BOTOX#26 21 - helped   BOTOX#27 21 - helped    BOTOX#28 12/10/21 - helped    AIMOVIG ( week, Oct first week, Nov first wk 140 mg, Dec first wk 140 mg, ) no benefit   Constipation +      Can not do RFA - pt has pacemaker "   Procedure: IOVERA peripheral nerve focus cold therapy (non ablative cryotherapy) 12/30/15 - not help   Indication: Cryotherapy of select peripheral nerves of the head was performed to treat chronic headaches that failed to respond to several preventive pharmacological therapies.   Sedation: None   Informed consent: The procedure, risks, benefits and options were discussed with the patient. There are no contraindications to the procedure. The patient expressed understanding and agreed to the procedure. I verify that I personally obtained the patient's consent prior to the start of the procedure.  Location:  Bilateral Supra orbital nerve (3 cycles on R and 1 cycle on L)   Bilateral Occipital nerve   3 cycles   Pain relief: Pain score reduced 10/10->0/10   9/26 Bilateral Sphenopalatine Ganglion and bilateral Maxillary Branch (Trigeminal Nerve) Block - no help   ONB - not help                                                                                                                                                         georges Pagan RN at 12/31/2014 3:54 PM                           Status: Signed                                       Expand All Collapse All   HEADACHE FASTTRACK  PAIN 7/10 NAUSEA 0/10  ROUND 1: TORADOL, IMITREX, MORPHINE, BENADRYL, AND MAGNESIUM  PAIN 7/10 NAUSEA 0/10  PT STATED SHE WAS READY TO GO HOME AND GO TO SLEEP. PT D/C'ED IN NAD                              Shannon Pagan RN at 10/5/2015 12:49 PM                           Status: Signed                                       Expand All Collapse All   HEADACHE FASTTRACK  PAIN 8/10 NAUSEA 10/10  FIRST ROUND: tORADOL, BENADRYL, COMPAZINE, IMITREX, MAGNESIUM, AND VALPROATE.  PAIN 1/10 NAUSEA 0/10  PT READY TO GO HOME AND FEELING BETTER. PT LEFT IN NAD WITH FAMILY MEMBER  TOTAL TIME: 8491-3835                         Shannon Pagan RN at 10/20/2015 3:05 PM                           Status: Signed                                       Expand  All Collapse All   HEADACHE FASTTRACK  PAIN 10/10 NAUSEA 0/10  FIRST ROUND: tORADOL, BENADRYL, COMPAZINE, IMITREX, MAGNESIUM, AND VALPROATE.  BP BEING MONITORED EVERY 15 MIN AND REMAINED 170'S/80-90'S. DR CHOPRA NOTIFIED AND STATED TO MAKE SURE BP DID NOT EXCEED 180 SYSTOLIC OR PT SHOULD REPORT TO ED. BP AT 1500 183/92. DR CHOPRA NOTIFIED AND GAVE ORDER TO STOP INFUSION AND SEND PATIENT TO ED. PT BROUGHT TO ED BY INFUSION NURSE VIA WHEELCHAIR.   PAIN 8/10 NAUSEA 0/10  TOTAL TIME: 9200-9665                                                     Progress Notes                                               Shannon Pagan, RN at 2/24/2016 1:36 PM       Status: Signed                  Expand All Collapse All   HEADACHE FASTTRACK  PAIN 10/10 NAUSEA 7/10  FIRST ROUND: tORADOL, BENADRYL, AND MORPHINE-BP RANGING FROM 177-203/84-92, HR 60-82  MD NOTIFIED AND GAVE ORDERS TO SEND TO ED. PT LEFT VIA W/C WITH INFUSION NURSE ON WAY TO ED.            Headache risk factors:   H/o TBI - [facility] (2013) + neck sprain   H/o Meningitis - no   F/h Aneurysms - no       Review of Systems   Constitutional: Negative.   HENT: Negative.   Eyes: Negative.   Respiratory: Negative.   Cardiovascular: Negative.   Gastrointestinal: Negative.   Endocrine: Negative.   Genitourinary: Negative.   Musculoskeletal: Negative.   Skin: Negative.   Allergic/Immunologic: Negative.   Hematological: Negative.   Psychiatric/Behavioral: insomnia      Objective:     Physical Exam   Constitutional: She is oriented to person, place, and time. She appears well-developed.   obesity   Psychiatric: She has a normal mood and flat affect. Her behavior is normal. Judgment and thought content normal.   Headache Exam:  Optic disc is flat OU   Temples appear symmetric  No V1,V2,V3 distribution allodynia/hyperalgesia catalina   No facial swelling  No gross TMJ abnormalities   No ptosis   No conj injection   No meningismus   No neck stiffness  No C spine tenderness  Cervical facet tenderness on  palpation catalina   No tender points over trapezium   catalina supraorbital nerve exit point tenderness  catalina occipital nerve exit point tenderness  No auriculotemporal nerve tenderness   Tenderness of levator scapula catalina    Gait - wide based gait     Assessment:                             1.  Occipital neuralgia                             2.  Cervicalgia                             3.  4  5  6 Chronic migraine without aura, with intractable migraine, so stated, with status migrainosus (post traumatic) post concussive?  Depression   TMJ - R sided   Insomnia - SHIRA      Head CT  Findings: There is no evidence of acute or recent major vascular territory cerebral infarction, parenchymal hemorrhage, or intra-axial mass.  There are no extra-axial masses or abnormal fluid collections.  The ventricular system is within normal limits of size for age and shows no distortion by mass-effect or evidence of hydrocephalus.  There is no fracture or destructive osseous lesion.  The extracranial soft tissues are unremarkable.  The visualized paranasal sinuses and mastoid air cells are clear.   Impression    No acute intracranial abnormality.  ______________________________________     Electronically signed by resident: KRISSY MAYERS MD  Date: 03/14/16  Time: 17:09            Plan:                             1. Prophylactic medication -   Despiramine 25 mg AM (for dizziness) PRN  zoloft 25 mg daily    Aricept 20 mg daily   Neurontin 900 mg TID    Magnesium 200 mg daily  Topamax 200 mg BID   Clonazepam BID for anxiety   Cont B2, B6 supplements   2, Breakthrough headache - zanaflex 8 mg Q8, etodolac  Multiple alternative treatment options were offered to the patient   3. Refrain from over counter pain medications. Discussed medication overuse headache.cont Magnesium 400 mg PO BID   4. Occipital neuralgia - If medical management is ineffective may consider occipital nerve blocks.   5. I again urged the patient to keep a headache calender.     f/up Dr. Rock for TBI   Vit D supplements    f/up sleep clinic - CPAP - waiting for new machine   Pain cream      Refer to back and spine clinic     Cont AJOVY (April 2019- ) - gap Feb-April 2021)  No side effects      ONB 2 weeks prior to next BOTOX       BOTOX was performed as an indicated therapy for intractable chronic migraine headaches given that the patient failed > 5 prophylactic medications  Botulinum Toxin Injection Procedure   Pre-operative diagnosis: Chronic migraine   Post-operative diagnosis: Same   Procedure: Chemical neurolysis   After risks and benefits were explained including bleeding, infection, worsening of pain, damage to the areas being injected, weakness of muscles, loss of muscle control, dysphagia if injecting the head or neck, facial droop if injecting the facial area, painful injection, allergic or other reaction to the medications being injected, and the failure of the procedure to help the problem, a signed consent was obtained.   The patient was placed in a comfortable area and the sites to be treated were identified.The area to be treated was prepped three times with alcohol and the alcohol allowed to dry. Next, a 30 gauge needle was used to inject the medication in the area to be treated.   Area(s) injected:   Total Botox used: 165 Units   Botox wastage: 35 Units   Injection sites:     muscle bilaterally ( a total of 10 units divided into 2 sites)   Procerus muscle (5 units)   Frontalis muscle bilaterally (a total of 20 units divided into 4 sites)   Temporalis muscle bilaterally (a total of 50 units divided into 8 sites)   Occipitalis muscle bilaterally (a total of 30 units divided into 6 sites)   Cervical paraspinal muscles (a total of 20 units divided into 4 sites)   Trapezius muscle bilaterally (a total of 30 units divided into 6 sites)   Masseter 5 units catalina      Complications: none       RTC for the next Botox injection: 10 weeks

## 2022-02-21 NOTE — PROGRESS NOTES
Subjective:      Patient ID: Amna Chawla is a 56 y.o. female.    Chief Complaint: Sciatica    Ms Chawla is a 57 yo female sent in consultation by Dr. Cortez for evaluation of sciatica.  She has had back pain since 2013 when she had stroke and Cardiac arrest.  She was in a wheelchair for a period.  She had to learn to walk again.  Then she started having butt and leg pain.  She was told sciatic nerve.  She went to PT and she got some exercises and it helped.  The current pain started last week.  The pain comes for a couple of days and then goes away.  The pain is a couple of times a month.  She does not know what makes it happen.  She has the most pain with sitting.  The pain is worse in the left glute.  The pain is not constant.  The pain is the back of the leg to just below knee.  The pain is sharp shooting pain.  The pain is 0/10 now, worst 10/10 last week with sitting, best 0/10.  She did therapy for the CVA but she has not been to therapy for her back.  She had right CVA with Left HP.  She has not been to chiropractor.  She takes gabapentin daily for leg pain from CVA.  She takes tizanidine for migraines.      X-ray lumbar 2016  Alignment: Grade 1 anterolisthesis noted at L4-L5.  No dynamic instability.  Vertebrae: Vertebral body heights are maintained.  No suspicious lytic or blastic lesions.  Discs and facets: Mild disc height loss noted at L4-L5.  There is lower lumbar facet arthropathy.  Soft tissues: Unremarkable.  IMPRESSION:      As above.    CT cervical 2016  Evaluation of the intraspinal structures is limited secondary to artifact from patient body habitus.     Sagittal reformatted images demonstrate mild straightening of the normal cervical lordosis.  Vertebral body heights are well-maintained. Intervertebral disc spaces are relatively well-maintained with mild disc height loss at the T1-T2 and T2-T3 levels. There is no evidence of fracture or dislocation.       C2-C4:No significant osseous change,  spinal canal stenosis or neuroforaminal narrowing.  C4-C5:Mild bilateral uncovertebral joint spurring without resultant neuroforaminal narrowing or spinal canal stenosis.  C5-C6:Mild bilateral uncovertebral joint spurring without resultant neuroforaminal narrowing or spinal canal stenosis.  C6-T3:No significant osseous change, spinal canal stenosis or neuroforaminal narrowing.     The soft tissue structures visualized in the neck are unremarkable.  Partially visualized transvenous pacer leads unchanged from prior.     The airway is patent and the lung apices are unremarkable.  The visualized portions of the brain demonstrate no significant abnormality.  IMPRESSION:         Evaluation of the intraspinal contents is limited secondary to artifact from patient body habitus.     Mild degenerative changes of the cervical spine without evidence of spinal canal stenosis or neuroforaminal narrowing.      Past Medical History:  No date: Anticoagulant long-term use  No date: Anxiety  No date: Asthma  No date: Atrial fibrillation  No date: Brain anoxic injury  8/28/2014: Cervicalgia  2/19/2013: CHI (closed head injury)  5/30/2015: Convulsion  4/12/2017: Decreased ROM of left shoulder  2013: Defibrillator activation  No date: Depression  No date: Heart block  2/2013: History of sudden cardiac arrest      Comment:  PEA arrest with subsequent long-QT  No date: Hx of psychiatric care  No date: Hypertension  4/11/2018: Left atrial enlargement  2013: Pacemaker  11/1/2013: Paresthesia  2/8/2013: Prolonged Q-T interval on ECG  No date: Psychiatric problem  No date: Seizures  No date: Sleep difficulties  No date: Stroke      Comment:  weakness lt side  No date: Therapy  No date: Thyroid disease  2/21/2017: Upper airway resistance syndrome    Past Surgical History:  No date: breast reduction  No date: CARDIAC DEFIBRILLATOR PLACEMENT  No date: CARDIAC DEFIBRILLATOR PLACEMENT  No date: CARPAL TUNNEL RELEASE; Right  5/7/2019: COLONOSCOPY;  N/A      Comment:  Procedure: COLONOSCOPY;  Surgeon: Juan Coffey MD;                 Location: [facility] ENDO (2ND FLR);  Service: Endoscopy;                 Laterality: N/A;  per DR. Bustamante Pt to have balloon with DR. Coffey due to excesive looping, could not reach cecum - see               telephone encounter 3/8/19 and last procedure report                dated 6/24/14/ Per Piedad schedule as 90 min case-                ERWpacemaker/AICD Boitronik/ per , ok to                hold Xareltox 2 days  No date: HERNIA REPAIR  No date: HIATAL HERNIA REPAIR  10/2017: INSERT / REPLACE / REMOVE PACEMAKER      Comment:  CRT-D upgrade  12/7/2020: REVISION OF IMPLANTABLE CARDIOVERTER-DEFIBRILLATOR (ICD)   ELECTRODE LEAD PLACEMENT; Left      Comment:  Procedure: REVISION, INSERTION, ELECTRODE LEAD, ICD;                 Surgeon: Avelino Mejia MD;  Location: [facility] EP LAB;                 Service: Cardiology;  Laterality: Left;  12/7/2020: REVISION OF SKIN POCKET FOR CARDIOVERTER-DEFIBRILLATOR      Comment:  Procedure: Revision, Skin Pocket, For                Cardioverter-Defibrillator;  Surgeon: Avelino Mejia MD;                Location: [facility] EP LAB;  Service: Cardiology;;  No date: TOTAL REDUCTION MAMMOPLASTY  12/7/2020: TRANSESOPHAGEAL ECHOCARDIOGRAPHY; N/A      Comment:  Procedure: ECHOCARDIOGRAM, TRANSESOPHAGEAL;  Surgeon:                Ivory Goodman MD;  Location: [facility] EP LAB;  Service:               Cardiology;  Laterality: N/A;  12/7/2020: TRANSESOPHAGEAL ECHOCARDIOGRAPHY; N/A      Comment:  Procedure: ECHOCARDIOGRAM, TRANSESOPHAGEAL;  Surgeon:                [facility] Diagnostic Provider;  Location: [facility] OR 2ND FLR;                 Service: Cardiology;  Laterality: N/A;  8/2/2019: TRIGGER FINGER RELEASE; Left      Comment:  Procedure: RELEASE, TRIGGER FINGER left middle;                 Surgeon: Matt Pugh Jr., MD;  Location: [facility];               Service: Plastics;  Laterality:  Left;  2/11/2020: TRIGGER FINGER RELEASE; Right      Comment:  Procedure: RELEASE, TRIGGER FINGER middle;  Surgeon:                Matt Pugh Jr., MD;  Location: [facility];                 Service: Plastics;  Laterality: Right;  No date: TUBAL LIGATION    Review of patient's family history indicates:  Problem: Hypertension      Relation: Mother          Age of Onset: (Not Specified)  Problem: COPD      Relation: Unknown          Age of Onset: (Not Specified)  Problem: Heart failure      Relation: Unknown          Age of Onset: (Not Specified)  Problem: Glaucoma      Relation: Maternal Grandmother          Age of Onset: (Not Specified)  Problem: Glaucoma      Relation: Paternal Grandmother          Age of Onset: (Not Specified)      Social History    Socioeconomic History      Marital status: Single    Occupational History        Employer: [employer]    Tobacco Use      Smoking status: Never Smoker      Smokeless tobacco: Never Used    Substance and Sexual Activity      Alcohol use: No      Drug use: No      Sexual activity: Not Currently    Social History Narrative      Now on disability    Social Determinants of Health  Financial Resource Strain: Medium Risk      Difficulty of Paying Living Expenses: Somewhat hard  Food Insecurity: No Food Insecurity      Worried About Running Out of Food in the Last Year: Never true      Ran Out of Food in the Last Year: Never true  Transportation Needs: Unmet Transportation Needs      Lack of Transportation (Medical): Yes      Lack of Transportation (Non-Medical): Yes  Physical Activity: Insufficiently Active      Days of Exercise per Week: 5 days      Minutes of Exercise per Session: 20 min  Stress: No Stress Concern Present      Feeling of Stress : Only a little  Social Connections: Unknown      Frequency of Communication with Friends and Family: More than three times a week      Frequency of Social Gatherings with Friends and Family: More than three times a  week      Active Member of Clubs or Organizations: No      Attends Club or Organization Meetings: 1 to 4 times per year      Marital Status: Never   Housing Stability: Low Risk       Unable to Pay for Housing in the Last Year: No      Number of Places Lived in the Last Year: 1      Unstable Housing in the Last Year: No    Current Outpatient Medications:  (Magic mouthwash) 1:1:1 diphenhydramine(Benadryl) 12.5mg/5ml liq, aluminum & magnesium hydroxide-simethicone (Maalox), LIDOcaine viscous 2%, Swish and spit 10 mLs every 4 (four) hours as needed (throat pain). for mouth sores, Disp: 450 mL, Rfl: 0  ARIPiprazole (ABILIFY) 15 MG Tab, Take 1 tablet (15 mg total) by mouth once daily., Disp: 90 tablet, Rfl: 1  blood sugar diagnostic Strp, 1 strip by Misc.(Non-Drug; Combo Route) route 2 (two) times daily., Disp: 200 strip, Rfl: 3  blood-glucose meter kit, Please provide with insurance covered meter, Disp: 1 each, Rfl: 0  carvediloL (COREG) 25 MG tablet, TAKE 1 TABLET(25 MG) BY MOUTH TWICE DAILY WITH MEALS, Disp: 60 tablet, Rfl: 11  clonazePAM (KLONOPIN) 1 MG tablet, Take 1 tablet (1 mg total) by mouth daily as needed for Anxiety., Disp: 20 tablet, Rfl: 3  clopidogreL (PLAVIX) 75 mg tablet, Take 1 tablet (75 mg total) by mouth once daily., Disp: 90 tablet, Rfl: 3  cyanocobalamin, vitamin B-12, 1,000 mcg Subl, Place 1,000 mcg under the tongue once daily., Disp: 90 tablet, Rfl: 3  diclofenac sodium (VOLTAREN) 1 % Gel, Apply 2 g topically 4 (four) times daily., Disp: 100 g, Rfl: 1  donepezil (ARICEPT) 10 MG tablet, Take 1 tablet (10 mg total) by mouth 2 (two) times daily., Disp: 180 tablet, Rfl: 3  ergocalciferol (ERGOCALCIFEROL) 50,000 unit Cap, Take 1 capsule (50,000 Units total) by mouth twice a week., Disp: 24 capsule, Rfl: 3  etodolac (LODINE) 500 MG tablet, Take 1 tablet (500 mg total) by mouth 2 (two) times daily as needed (migraine)., Disp: 30 tablet, Rfl: 12  flurbiprofen (ANSAID) 100 MG tablet, Take 1 tablet  (100 mg total) by mouth 2 (two) times daily as needed (migraine)., Disp: 12 tablet, Rfl: 12  fluticasone propionate (FLONASE) 50 mcg/actuation nasal spray, 1 spray (50 mcg total) by Each Nostril route once daily., Disp: 16 g, Rfl: 3  fremanezumab-vfrm (AJOVY) 225 mg/1.5 mL injection, Inject 1.5 mLs (225 mg total) into the skin every 28 days., Disp: 1 each, Rfl: 12  furosemide (LASIX) 20 MG tablet, Take 1 tablet (20 mg total) by mouth once daily., Disp: 30 tablet, Rfl: 6  gabapentin (NEURONTIN) 300 MG capsule, Take 3 capsules (900 mg total) by mouth 3 (three) times daily., Disp: 810 capsule, Rfl: 12  lancets Misc, 1 Device by Misc.(Non-Drug; Combo Route) route 2 (two) times daily., Disp: 200 each, Rfl: 3  losartan (COZAAR) 100 MG tablet, Take 1 tablet (100 mg total) by mouth once daily., Disp: 90 tablet, Rfl: 3  magnesium 200 mg Tab, Take 200 mg by mouth once daily. (Patient taking differently: Take 200 mg by mouth every other day.), Disp: 30 each, Rfl: 12  metFORMIN (GLUCOPHAGE) 1000 MG tablet, Take 1 tablet (1,000 mg total) by mouth 2 (two) times daily with meals., Disp: 180 tablet, Rfl: 3  mupirocin (BACTROBAN) 2 % ointment, Apply to affected area 3 times daily, Disp: 22 g, Rfl: 1  ondansetron (ZOFRAN-ODT) 4 MG TbDL, Take 1 tablet (4 mg total) by mouth every 12 (twelve) hours as needed (nausea)., Disp: 40 tablet, Rfl: 12  ondansetron (ZOFRAN-ODT) 4 MG TbDL, Take 1 tablet (4 mg total) by mouth every 12 (twelve) hours as needed (nausea)., Disp: 40 tablet, Rfl: 12  pantoprazole (PROTONIX) 40 MG tablet, TAKE 1 TABLET(40 MG) BY MOUTH EVERY DAY, Disp: 90 tablet, Rfl: 3  predniSONE (DELTASONE) 10 MG tablet, 3 tablets daily x 2 days, 2 tablets daily x 2 days, 1 tablet daily x 2 days, Disp: 12 tablet, Rfl: 0  rivaroxaban (XARELTO) 20 mg Tab, TAKE 1 TABLET BY MOUTH DAILY EVENING MEAL WITH DINNER, Disp: 30 tablet, Rfl: 11  rosuvastatin (CRESTOR) 40 MG Tab, TAKE 1 TABLET(40 MG) BY MOUTH EVERY EVENING, Disp: 90 tablet, Rfl:  3  sertraline (ZOLOFT) 100 MG tablet, Take 0.5 tablet by mouth daily x 5 days then increase to 1 tablet daily, Disp: 90 tablet, Rfl: 3  silver sulfADIAZINE 1% (SILVADENE) 1 % cream, Apply topically 2 (two) times daily., Disp: 50 g, Rfl: 0  tiaGABine (GABITRIL) 4 MG tablet, Take 1 tablet (4 mg total) by mouth every evening., Disp: 30 tablet, Rfl: 12  tiZANidine (ZANAFLEX) 4 MG tablet, TAKE 1 TABLET(4 MG) BY MOUTH EVERY 6 HOURS AS NEEDED, Disp: 90 tablet, Rfl: 12  tiZANidine (ZANAFLEX) 4 MG tablet, Take 1 tablet (4 mg total) by mouth every 6 (six) hours as needed., Disp: 90 tablet, Rfl: 12  tiZANidine (ZANAFLEX) 4 MG tablet, Take 1 tablet (4 mg total) by mouth every 6 (six) hours as needed., Disp: 90 tablet, Rfl: 12  TRUE METRIX GLUCOSE METER Misc, TEST BG BID, Disp: , Rfl: 0  ubrogepant (UBROGEPANT) 100 mg tablet, Take 1 tablet (100 mg total) by mouth 2 (two) times daily as needed for Migraine., Disp: 10 tablet, Rfl: 12  zolpidem (AMBIEN) 10 mg Tab, Take 1 tablet (10 mg total) by mouth nightly as needed (insomnia)., Disp: 30 tablet, Rfl: 5  EPINEPHrine (EPIPEN) 0.3 mg/0.3 mL AtIn, Inject 0.3 mLs (0.3 mg total) into the muscle once. for 1 dose, Disp: 0.3 mL, Rfl: 0    Current Facility-Administered Medications:  onabotulinumtoxina injection 200 Units, 200 Units, Intramuscular, Q90 Days, David Cortez MD, 200 Units at 10/19/18 1713  onabotulinumtoxina injection 200 Units, 200 Units, Intramuscular, Q90 Days, David Cortez MD, 200 Units at 12/28/18 1106  onabotulinumtoxina injection 200 Units, 200 Units, Intramuscular, Q90 Days, David Cortez MD, 200 Units at 03/13/19 1504  onabotulinumtoxina injection 200 Units, 200 Units, Intramuscular, Q90 Days, David Cortez MD, 200 Units at 05/23/19 1123  onabotulinumtoxina injection 200 Units, 200 Units, Intramuscular, Q90 Days, David Cortez MD, 200 Units at 10/11/19 1112  onabotulinumtoxina injection 200 Units, 200 Units, Intramuscular, Q90 Days, David Cortez MD, 200 Units  at 12/19/19 1036  onabotulinumtoxina injection 200 Units, 200 Units, Intramuscular, Q10 weeks, David Cortez MD, 200 Units at 03/10/20 1036  onabotulinumtoxina injection 200 Units, 200 Units, Intramuscular, Q90 Days, David Cortez MD, 200 Units at 06/26/20 1538  onabotulinumtoxina injection 200 Units, 200 Units, Intramuscular, q12 weeks, David Cortez MD, 200 Units at 07/06/21 1059  onabotulinumtoxina injection 200 Units, 200 Units, Intramuscular, q12 weeks, David Cortez MD, 200 Units at 02/17/22 1651    Facility-Administered Medications Ordered in Other Visits:  lactated ringers infusion, , Intravenous, Continuous, Humberto Angel MD, Stopped at 02/11/20 0801  lidocaine (PF) 10 mg/ml (1%) injection 5 mg, 0.5 mL, Intradermal, Once, Humberto Angel MD        Review of patient's allergies indicates:   -- Aspirin -- Hives   -- Imitrex (sumatriptan) -- Palpitations   -- Penicillins -- Hives and Swelling   -- Shellfish containing products -- Anaphylaxis    --  seafood   -- Reglan (metoclopramide hcl) -- Other (See Comments)    --  Parkinsonism        Review of Systems   Constitutional: Negative for weight gain and weight loss.   Cardiovascular: Negative for chest pain.   Respiratory: Negative for shortness of breath.    Musculoskeletal: Positive for back pain. Negative for joint pain and joint swelling.   Gastrointestinal: Negative for abdominal pain, bowel incontinence, nausea and vomiting.   Genitourinary: Negative for bladder incontinence.   Neurological: Positive for paresthesias (left side tingling). Negative for numbness.         Objective:        General: Amna is well-developed, well-nourished, appears stated age, in no acute distress, alert and oriented to time, place and person.     General    Vitals reviewed.  Constitutional: She is oriented to person, place, and time. She appears well-developed and well-nourished.   HENT:   Head: Normocephalic and atraumatic.   Pulmonary/Chest: Effort normal.    Neurological: She is alert and oriented to person, place, and time.   Psychiatric: She has a normal mood and affect. Her behavior is normal. Judgment and thought content normal.     General Musculoskeletal Exam   Gait: normal     Right Ankle/Foot Exam     Tests   Heel Walk: able to perform  Tiptoe Walk: able to perform    Left Ankle/Foot Exam     Tests   Heel Walk: able to perform  Tiptoe Walk: able to perform  Back (L-Spine & T-Spine) / Neck (C-Spine) Exam     Tenderness Right paramedian tenderness of the Sacrum.     Back (L-Spine & T-Spine) Range of Motion   Extension: 30   Flexion: 90   Lateral bend right: 20   Lateral bend left: 20   Rotation right: 40   Rotation left: 40     Spinal Sensation   Right Side Sensation  C-Spine Level: normal   L-Spine Level: normal  S-Spine Level: normal  Left Side Sensation  C-Spine Level: normal  L-Spine Level: normal  S-Spine Level: normal    Back (L-Spine & T-Spine) Tests   Right Side Tests  Straight leg raise:      Sitting SLR: > 70 degrees      Left Side Tests  Straight leg raise:     Sitting SLR: > 70 degrees          Other She has no scoliosis .  Spinal Kyphosis:  Absent    Comments:  Tender over the right piriformis  Neg NOBLE bilaterally      Muscle Strength   Right Upper Extremity   Biceps: 5/5   Deltoid:  5/5  Triceps:  5/5  Wrist extension: 5/5   Finger Flexors:  5/5  Left Upper Extremity  Biceps: 5/5   Deltoid:  5/5  Triceps:  5/5  Wrist extension: 5/5   Finger Flexors:  5/5  Right Lower Extremity   Hip Flexion: 5/5   Quadriceps:  5/5   Anterior tibial:  5/5   EHL:  5/5  Left Lower Extremity   Hip Flexion: 5/5   Quadriceps:  5/5   Anterior tibial:  5/5   EHL:  5/5    Reflexes     Left Side  Biceps:  2+  Triceps:  2+  Brachioradialis:  2+  Achilles:  2+  Left Buckner's Sign:  Absent  Babinski Sign:  absent  Quadriceps:  2+    Right Side   Biceps:  2+  Triceps:  2+  Brachioradialis:  2+  Achilles:  2+  Right Buckner's Sign:  absent  Babinski Sign:  absent  Quadriceps:   2+    Vascular Exam     Right Pulses        Carotid:                  2+    Left Pulses        Carotid:                  2+              Assessment:       1. Piriformis syndrome of both sides    2. Bilateral sciatica           Plan:       Orders Placed This Encounter    X-Ray Lumbar Spine Ap Lateral w/Flex Ext    Ambulatory referral/consult to Physical/Occupational Therapy     1. We discussed back pain and the nature of back pain.  We discussed that it is not one thing that causes the pain but an accumulation of multiple things that we do.  She has intermittent pain but will come about once a week and hard to sit.  We discussed the back and sciatic nerve and the muscle  2. We discussed posture sitting and the importance of trying to sit better.  We discussed working on how sit  3. We discussed the benefits of therapy and exercise and continuing to move.  We discussed exercise and trying to work on staying strong.  She has recovered from left HP, but we discussed important to always work on strengthening and exercise.  She has not been to the gym since covid.  We discussed walking on her own.  She has a plan to do some laps in the hallway 2 times a day.  We discussed 10 min increments  4. PT for back and core strengthening piriformis stretches and glute strengthening and dry needling and HEP.  Holiness  5. X-ray lumbar  6. Tizanidine can be used if start to feel the pain  7. Continue gabapentin  8. RTC 4 months    More than 50% of the total time  of 45 minutes was spent face to face in counseling on diagnosis and treatment options. I also counseled patient  on common and most usual side effect of prescribed medications.  I reviewed Primary care , and other specialty's notes to better coordinate patient's care. All questions were answered, and patient voiced understanding.     A consultation note will be sent to Dr. Cortez through epic.  Thanks for the consult      Follow-up: Follow up in about 4 months (around  6/22/2022). If there are any questions prior to this, the patient was instructed to contact the office.

## 2022-02-22 ENCOUNTER — OFFICE VISIT (OUTPATIENT)
Dept: SPINE | Facility: CLINIC | Age: 57
End: 2022-02-22
Attending: PHYSICAL MEDICINE & REHABILITATION
Payer: MEDICARE

## 2022-02-22 ENCOUNTER — HOSPITAL ENCOUNTER (OUTPATIENT)
Dept: RADIOLOGY | Facility: OTHER | Age: 57
Discharge: HOME | End: 2022-02-22
Attending: PHYSICAL MEDICINE & REHABILITATION
Payer: MEDICARE

## 2022-02-22 VITALS
DIASTOLIC BLOOD PRESSURE: 86 MMHG | BODY MASS INDEX: 50.02 KG/M2 | WEIGHT: 293 LBS | SYSTOLIC BLOOD PRESSURE: 186 MMHG | HEART RATE: 96 BPM | HEIGHT: 64 IN

## 2022-02-22 DIAGNOSIS — G57.03 PIRIFORMIS SYNDROME OF BOTH SIDES: ICD-10-CM

## 2022-02-22 DIAGNOSIS — G57.03 PIRIFORMIS SYNDROME OF BOTH SIDES: Primary | ICD-10-CM

## 2022-02-22 DIAGNOSIS — M54.31 BILATERAL SCIATICA: ICD-10-CM

## 2022-02-22 DIAGNOSIS — M54.32 BILATERAL SCIATICA: ICD-10-CM

## 2022-02-22 PROCEDURE — 99204 PR OFFICE/OUTPT VISIT, NEW, LEVL IV, 45-59 MIN: ICD-10-PCS | Mod: S$PBB,,, | Performed by: PHYSICAL MEDICINE & REHABILITATION

## 2022-02-22 PROCEDURE — 99999 PR PBB SHADOW E&M-EST. PATIENT-LVL V: CPT | Mod: PBBFAC,,, | Performed by: PHYSICAL MEDICINE & REHABILITATION

## 2022-02-22 PROCEDURE — 99999 PR PBB SHADOW E&M-EST. PATIENT-LVL V: ICD-10-PCS | Mod: PBBFAC,,, | Performed by: PHYSICAL MEDICINE & REHABILITATION

## 2022-02-22 PROCEDURE — 99215 OFFICE O/P EST HI 40 MIN: CPT | Mod: PBBFAC | Performed by: PHYSICAL MEDICINE & REHABILITATION

## 2022-02-22 PROCEDURE — 72110 X-RAY EXAM L-2 SPINE 4/>VWS: CPT | Mod: TC,FY

## 2022-02-22 PROCEDURE — 72110 XR LUMBAR SPINE AP AND LAT WITH FLEX/EXT: ICD-10-PCS | Mod: 26,,, | Performed by: RADIOLOGY

## 2022-02-22 PROCEDURE — 72110 X-RAY EXAM L-2 SPINE 4/>VWS: CPT | Mod: 26,,, | Performed by: RADIOLOGY

## 2022-02-22 PROCEDURE — 99204 OFFICE O/P NEW MOD 45 MIN: CPT | Mod: S$PBB,,, | Performed by: PHYSICAL MEDICINE & REHABILITATION

## 2022-02-23 ENCOUNTER — OUTPATIENT CASE MANAGEMENT (OUTPATIENT)
Dept: ADMINISTRATIVE | Facility: OTHER | Age: 57
End: 2022-02-23
Payer: MEDICARE

## 2022-02-23 NOTE — PROGRESS NOTES
Outpatient Care Management  Plan of Care Follow Up Visit    Patient: Amna Chawla  MRN: [id number]  Date of Service: 02/23/2022  Completed by: Julissa Hand RN  Referral Date: 12/10/2021  Program: Case Management (High Risk)    No chief complaint on file.      Brief Summary:   Reviewed bp at cardiology  Pt pet stress was negative        Next steps from previous encounter  Case closure      Discussed progress from enrollment.      Reviewed short and long term goals met with pt.  Advised of case closure today.  Pt verbalized understanding.  Reviewed upcoming appointments with pt and noted them on calender.No further needs at this time.  Pt was advised that they could contact if any new needs arise and verbalized understanding.      Interventions  Chart reviewed  Reviewed upcoming appt schedule      Patient Summary     Involvement of Care:  Do I have permission to speak with other family members about your care?       Patient Reported Labs & Vitals:  1.  Any Patient Reported Labs & Vitals?     2.  Patient Reported Blood Pressure:     3.  Patient Reported Pulse:     4.  Patient Reported Weight (Kg):     5.  Patient Reported Blood Glucose (mg/dl):       Medical and social history was reviewed with patient and/or caregiver.     Clinical Assessment     Reviewed and provided basic information on available community resources for mental health, transportation, wellness resources, and palliative care programs with patient and/or caregiver.     Complex Care Plan     Care plan was discussed and completed today with input from patient and/or caregiver.    Patient Instructions     Instructions were provided via the [email or web address] patient resources and are available for the patient to view on the patient portal.      Todays OPCM Self-Management Care Plan was developed with the patients/caregivers input and was based on identified barriers from todays assessment.  Goals were written today with the patient/caregiver and the patient has  agreed to work towards these goals to improve his/her overall well-being. Patient verbalized understanding of the care plan, goals, and all of today's instructions. Encouraged patient/caregiver to communicate with his/her physician and health care team about health conditions and the treatment plan.  Provided my contact information today and encouraged patient/caregiver to call me with any questions as needed.

## 2022-02-24 DIAGNOSIS — G43.711 CHRONIC MIGRAINE WITHOUT AURA, WITH INTRACTABLE MIGRAINE, SO STATED, WITH STATUS MIGRAINOSUS: Primary | ICD-10-CM

## 2022-02-28 ENCOUNTER — EXTERNAL CHRONIC CARE MANAGEMENT (OUTPATIENT)
Dept: PRIMARY CARE CLINIC | Facility: CLINIC | Age: 57
End: 2022-02-28
Payer: MEDICARE

## 2022-02-28 PROCEDURE — 99490 CHRNC CARE MGMT STAFF 1ST 20: CPT | Mod: PBBFAC | Performed by: INTERNAL MEDICINE

## 2022-02-28 PROCEDURE — 99490 PR CHRONIC CARE MGMT, 1ST 20 MIN: ICD-10-PCS | Mod: S$PBB,,, | Performed by: INTERNAL MEDICINE

## 2022-02-28 PROCEDURE — 99490 CHRNC CARE MGMT STAFF 1ST 20: CPT | Mod: S$PBB,,, | Performed by: INTERNAL MEDICINE

## 2022-03-07 ENCOUNTER — TELEPHONE (OUTPATIENT)
Dept: ELECTROPHYSIOLOGY | Facility: CLINIC | Age: 57
End: 2022-03-07
Payer: MEDICARE

## 2022-03-07 ENCOUNTER — OFFICE VISIT (OUTPATIENT)
Dept: OTOLARYNGOLOGY | Facility: CLINIC | Age: 57
End: 2022-03-07
Payer: MEDICARE

## 2022-03-07 ENCOUNTER — PATIENT MESSAGE (OUTPATIENT)
Dept: CARDIOLOGY | Facility: CLINIC | Age: 57
End: 2022-03-07
Payer: MEDICARE

## 2022-03-07 VITALS — SYSTOLIC BLOOD PRESSURE: 132 MMHG | DIASTOLIC BLOOD PRESSURE: 83 MMHG | TEMPERATURE: 98 F | HEART RATE: 88 BPM

## 2022-03-07 DIAGNOSIS — I42.8 NONISCHEMIC CARDIOMYOPATHY: ICD-10-CM

## 2022-03-07 DIAGNOSIS — R04.0 EPISTAXIS: Primary | ICD-10-CM

## 2022-03-07 DIAGNOSIS — Z79.01 LONG TERM (CURRENT) USE OF ANTICOAGULANTS: ICD-10-CM

## 2022-03-07 DIAGNOSIS — Z86.718 HISTORY OF DVT (DEEP VEIN THROMBOSIS): ICD-10-CM

## 2022-03-07 DIAGNOSIS — Z86.73 HISTORY OF TIA (TRANSIENT ISCHEMIC ATTACK): ICD-10-CM

## 2022-03-07 PROCEDURE — 30901 PR CTRL 2SEBLEED,ANTER,SIMPLE: ICD-10-PCS | Mod: RT,S$GLB,, | Performed by: OTOLARYNGOLOGY

## 2022-03-07 PROCEDURE — 99214 OFFICE O/P EST MOD 30 MIN: CPT | Mod: 25,S$GLB,, | Performed by: OTOLARYNGOLOGY

## 2022-03-07 PROCEDURE — 30901 CONTROL OF NOSEBLEED: CPT | Mod: RT,S$GLB,, | Performed by: OTOLARYNGOLOGY

## 2022-03-07 PROCEDURE — 99214 PR OFFICE/OUTPT VISIT, EST, LEVL IV, 30-39 MIN: ICD-10-PCS | Mod: 25,S$GLB,, | Performed by: OTOLARYNGOLOGY

## 2022-03-07 NOTE — TELEPHONE ENCOUNTER
Spoke with patient who states that she has not scheduled an appointment with the ENT yet, as she spoke with her Cardiologist office who prescribes her Plavix and is awaiting further instruction regarding possibly going to the ER. Patient reinstructed on the importance of being seen today, whether it be with the ENT or the ER, and Dr Mejia recommends that she hold her Xarelto until bleeding is treated and resolved. Understanding verbalized.

## 2022-03-07 NOTE — TELEPHONE ENCOUNTER
Pt also contacted electrophysiology and dr Mejia told her ok to hold xarelto for now (see message). I called pt and she is to see her ENT at 1600 today.

## 2022-03-07 NOTE — PROGRESS NOTES
Subjective:     Chief Complaint:   Chief Complaint   Patient presents with    Epistaxis       Amna Chawla is a 56 y.o. female who was self-referred for epistaxis.    Patient reports having a significant nose bleed that started 2 am this morning. She denies nasal trauma or manipulation of the nose causing the bleeding. She reports initial bleeding from the right side. She reports most bleeding seems anterior.  She reports bleeding was somewhat stopped with tissue paper but this continued when she removed the tissue.  She is on Plavix and Xarelto due to her history of stroke,TIAs and afib.   The last time she had a nose bleed was in December but this stopped spontaneously.     There is not a prior history of sinonasal surgery.   She is not an active smoker.    Past Medical History  She has a past medical history of Anticoagulant long-term use, Anxiety, Asthma, Atrial fibrillation, Brain anoxic injury, Cervicalgia, CHI (closed head injury), Convulsion, Decreased ROM of left shoulder, Defibrillator activation, Depression, Heart block, History of sudden cardiac arrest, psychiatric care, Hypertension, Left atrial enlargement, Pacemaker, Paresthesia, Prolonged Q-T interval on ECG, Psychiatric problem, Seizures, Sleep difficulties, Stroke, Therapy, Thyroid disease, and Upper airway resistance syndrome.    Past Surgical History  She has a past surgical history that includes Cardiac defibrillator placement; Hiatal hernia repair; breast reduction; Tubal ligation; Cardiac defibrillator placement; Hernia repair; Carpal tunnel release (Right); Insert / replace / remove pacemaker (10/2017); Total Reduction Mammoplasty; Colonoscopy (N/A, 5/7/2019); Trigger finger release (Left, 8/2/2019); Trigger finger release (Right, 2/11/2020); Revision of implantable cardioverter-defibrillator (ICD) electrode lead placement (Left, 12/7/2020); Revision of skin pocket for cardioverter-defibrillator (12/7/2020); Transesophageal  echocardiography (N/A, 12/7/2020); and Transesophageal echocardiography (N/A, 12/7/2020).    Family History  Her family history includes COPD in her unknown relative; Glaucoma in her maternal grandmother and paternal grandmother; Heart failure in her unknown relative; Hypertension in her mother.    Social History  She reports that she has never smoked. She has never used smokeless tobacco. She reports that she does not drink alcohol and does not use drugs.    Allergies  She is allergic to aspirin, imitrex [sumatriptan], penicillins, shellfish containing products, and reglan [metoclopramide hcl].    Medications  She has a current medication list which includes the following prescription(s): diphenhydramine hcl, aripiprazole, blood sugar diagnostic, blood-glucose meter, carvedilol, clonazepam, clopidogrel, cyanocobalamin (vitamin b-12), diclofenac sodium, donepezil, ergocalciferol, etodolac, flurbiprofen, fluticasone propionate, fremanezumab-vfrm, furosemide, gabapentin, lancets, losartan, magnesium, metformin, mupirocin, ondansetron, ondansetron, pantoprazole, prednisone, rivaroxaban, rosuvastatin, sertraline, silver sulfadiazine 1%, tiagabine, tizanidine, tizanidine, tizanidine, true metrix glucose meter, ubrogepant, zolpidem, and epinephrine, and the following Facility-Administered Medications: lactated ringers, lidocaine (pf) 10 mg/ml (1%), onabotulinumtoxina, onabotulinumtoxina, onabotulinumtoxina, onabotulinumtoxina, onabotulinumtoxina, onabotulinumtoxina, onabotulinumtoxina, onabotulinumtoxina, and onabotulinumtoxina.    Review of Systems  Negative except per HPI     Objective:     /83 (BP Location: Right arm, Patient Position: Sitting, BP Method: Large (Automatic))   Pulse 88   Temp 97.5 °F (36.4 °C) (Temporal)   LMP 02/04/2016 (Approximate)      Constitutional:   Vital signs are normal. She appears well-developed and well-nourished. Normal speech.      Head:  Normocephalic and atraumatic.      Ears:    Right Ear: No drainage, swelling or tenderness. No middle ear effusion. No decreased hearing is noted.   Left Ear: No drainage, swelling or tenderness.  No middle ear effusion. No decreased hearing is noted.     Nose:  No mucosal edema, rhinorrhea or nose lacerations. Epistaxis is observed. Turbinates abnormal.          Mouth/Throat  Oropharynx not clear and moist. She does not have dentures. Normal dentition. No dental caries. No oropharyngeal exudate, posterior oropharyngeal edema or posterior oropharyngeal erythema.     Neck:  Thyroid normal, trachea normal, phonation normal and no adenopathy.     Pulmonary/Chest:   Effort normal. No apnea, no tachypnea and no bradypnea. No respiratory distress.     Psychiatric:   She has a normal mood and affect. Her speech is normal and behavior is normal.     Neurological:   She has neurological normal, alert and oriented.       Procedure    control of epistaxis     Procedure: silver nitrate cauterization   Surgeon:  James Pineda M.D.  Anesthesia:  Topical Afrin/Lidocaine     Discussed the risks, benefits and alternatives of cautery. Discussed biggest risk is recurrence of bleeding and need for further treatment. Patient wished to proceed. Active bleeding on the right head of the inferior turbinate was cauterized with silver nitrate. Patient tolerated the procedure well without complications.  bacitracin was placed over the cauterization site.      Data Reviewed    Hemoglobin (g/dL)   Date Value   12/10/2021 11.4 (L)     Hematocrit (%)   Date Value   12/10/2021 34.6 (L)     Platelets (K/uL)   Date Value   12/10/2021 237     Prothrombin Time (sec)   Date Value   11/12/2020 10.7     aPTT (sec)   Date Value   11/12/2020 25.1     INR (no units)   Date Value   11/12/2020 1.0           Assessment:     1. Epistaxis    2. Long term (current) use of anticoagulants    3. History of TIA (transient ischemic attack)    4. History of DVT (deep vein thrombosis)    5. Nonischemic  cardiomyopathy from RV pacing, resolved with upgrade cardiac resynchronization therapy          Plan:     I had a long discussion with the patient regarding her condition and the further workup and management options.  Patient tolerated cauterization well.  She is currently holding her Plavix and Xarelto.  She will touch base with her cardiologist.  Discussed that if it is safe from a cardiac standpoint, it would be best to wait 1 additional day before restarting these medications. However, if needed, can restart plavix and Xarelto tomorrow.     Do not blow nose for 2 week.  Sneeze with mouth open.  Do not take ibuprofen or aspirin for 1 week.  Place mupirocin ointment in nostrils at bedtime x 1 weeks  Use saline spray every 4 hours during the day to prevent dryness.  If bleeding recurs, use oxymetazoline (Afrin) spray in the nostril and call for follow-up appointment.    All questions answered and patient encouraged to call with any questions or concerns.

## 2022-03-07 NOTE — TELEPHONE ENCOUNTER
Spoke with patient who states that her nose has been bleeding since 2:30 am. Patient states that the bleeding has decreased, however has not stopped. Patient has had this issue in the past and is established with Dr Shaikh, ENT. Patient reports that the last dose of Xarelto was taken yesterday evening and Plavix yesterday morning. Patient instructed that I will discuss the information received with Dr Mejia,  advised to contact the ENT, hold Xarelto until further recommendation provided. Understanding verbalized.

## 2022-03-07 NOTE — TELEPHONE ENCOUNTER
----- Message from Avelino Mejia MD sent at 3/7/2022 10:02 AM CST -----  Thanks. I agree, needs to be seen/managed by ENT. Temporary cessation of her xarelto is fine until bleeding resolves and is treated.    ----- Message -----  From: Chelsea Reveles RN  Sent: 3/7/2022   9:59 AM CST  To: Avelino Mejia MD    See message below. Patient reports nose bleed while on Xarelto. Patient noted with a history of nosebleed in the past.     Spoke with patient who states that her nose has been bleeding since 2:30 am. Patient states that the bleeding has decreased, however has not stopped. Patient has had this issue in the past and is established with Dr Shaikh, ENT. Patient reports that the last dose of Xarelto was taken yesterday evening and Plavix yesterday morning. Patient instructed that I will discuss the information received with Dr Mejia,  advised to contact the ENT, hold Xarelto until further recommendation provided. Understanding verbalized.

## 2022-03-11 ENCOUNTER — PATIENT MESSAGE (OUTPATIENT)
Dept: CARDIOLOGY | Facility: CLINIC | Age: 57
End: 2022-03-11
Payer: MEDICARE

## 2022-03-14 ENCOUNTER — CLINICAL SUPPORT (OUTPATIENT)
Dept: CARDIOLOGY | Facility: HOSPITAL | Age: 57
End: 2022-03-14
Attending: INTERNAL MEDICINE
Payer: MEDICARE

## 2022-03-14 DIAGNOSIS — T82.110D PACEMAKER LEAD MALFUNCTION, SUBSEQUENT ENCOUNTER: ICD-10-CM

## 2022-03-14 PROCEDURE — 93284 PRGRMG EVAL IMPLANTABLE DFB: CPT | Mod: 26,,, | Performed by: INTERNAL MEDICINE

## 2022-03-14 PROCEDURE — 93284 PRGRMG EVAL IMPLANTABLE DFB: CPT

## 2022-03-14 PROCEDURE — 93284 CARDIAC DEVICE CHECK - IN CLINIC & HOSPITAL: ICD-10-PCS | Mod: 26,,, | Performed by: INTERNAL MEDICINE

## 2022-03-18 ENCOUNTER — PATIENT MESSAGE (OUTPATIENT)
Dept: ADMINISTRATIVE | Facility: OTHER | Age: 57
End: 2022-03-18
Payer: MEDICARE

## 2022-04-04 ENCOUNTER — PATIENT MESSAGE (OUTPATIENT)
Dept: INTERNAL MEDICINE | Facility: CLINIC | Age: 57
End: 2022-04-04
Payer: MEDICARE

## 2022-04-04 DIAGNOSIS — R73.03 PREDIABETES: ICD-10-CM

## 2022-04-04 DIAGNOSIS — E78.2 MIXED HYPERLIPIDEMIA: ICD-10-CM

## 2022-04-04 DIAGNOSIS — E55.9 VITAMIN D DEFICIENCY: ICD-10-CM

## 2022-04-04 DIAGNOSIS — I10 ESSENTIAL HYPERTENSION: Primary | ICD-10-CM

## 2022-04-04 DIAGNOSIS — D51.9 ANEMIA DUE TO VITAMIN B12 DEFICIENCY, UNSPECIFIED B12 DEFICIENCY TYPE: ICD-10-CM

## 2022-04-05 ENCOUNTER — PATIENT MESSAGE (OUTPATIENT)
Dept: INTERNAL MEDICINE | Facility: CLINIC | Age: 57
End: 2022-04-05
Payer: MEDICARE

## 2022-04-06 ENCOUNTER — DOCUMENTATION ONLY (OUTPATIENT)
Dept: REHABILITATION | Facility: OTHER | Age: 57
End: 2022-04-06
Payer: MEDICARE

## 2022-04-12 ENCOUNTER — PATIENT MESSAGE (OUTPATIENT)
Dept: INTERNAL MEDICINE | Facility: CLINIC | Age: 57
End: 2022-04-12
Payer: MEDICARE

## 2022-04-12 ENCOUNTER — CLINICAL SUPPORT (OUTPATIENT)
Dept: CARDIOLOGY | Facility: HOSPITAL | Age: 57
End: 2022-04-12
Payer: MEDICARE

## 2022-04-12 DIAGNOSIS — I44.2 ATRIOVENTRICULAR BLOCK, COMPLETE: ICD-10-CM

## 2022-04-12 DIAGNOSIS — I48.91 UNSPECIFIED ATRIAL FIBRILLATION: ICD-10-CM

## 2022-04-12 DIAGNOSIS — I42.9 CARDIOMYOPATHY, UNSPECIFIED: ICD-10-CM

## 2022-04-12 DIAGNOSIS — I47.20 VENTRICULAR TACHYCARDIA: ICD-10-CM

## 2022-04-12 DIAGNOSIS — Z95.810 PRESENCE OF AUTOMATIC (IMPLANTABLE) CARDIAC DEFIBRILLATOR: ICD-10-CM

## 2022-04-13 DIAGNOSIS — K21.9 GASTROESOPHAGEAL REFLUX DISEASE WITHOUT ESOPHAGITIS: ICD-10-CM

## 2022-04-13 DIAGNOSIS — I10 ESSENTIAL HYPERTENSION: ICD-10-CM

## 2022-04-14 RX ORDER — LOSARTAN POTASSIUM 100 MG/1
100 TABLET ORAL DAILY
Qty: 90 TABLET | Refills: 3 | Status: SHIPPED | OUTPATIENT
Start: 2022-04-14 | End: 2023-04-19 | Stop reason: SDUPTHER

## 2022-04-14 RX ORDER — PANTOPRAZOLE SODIUM 40 MG/1
40 TABLET, DELAYED RELEASE ORAL DAILY
Qty: 90 TABLET | Refills: 3 | Status: SHIPPED | OUTPATIENT
Start: 2022-04-14 | End: 2022-04-19

## 2022-04-14 NOTE — TELEPHONE ENCOUNTER
No new care gaps identified.  Powered by [employer]. Reference number: [id number].   4/13/2022 8:53:28 PM CDT

## 2022-04-18 PROCEDURE — 93295 DEV INTERROG REMOTE 1/2/MLT: CPT | Mod: ,,, | Performed by: INTERNAL MEDICINE

## 2022-04-18 PROCEDURE — 93295 CARDIAC DEVICE CHECK - REMOTE: ICD-10-PCS | Mod: ,,, | Performed by: INTERNAL MEDICINE

## 2022-04-19 ENCOUNTER — OFFICE VISIT (OUTPATIENT)
Dept: INTERNAL MEDICINE | Facility: CLINIC | Age: 57
End: 2022-04-19
Payer: MEDICARE

## 2022-04-19 ENCOUNTER — TELEPHONE (OUTPATIENT)
Dept: ENDOSCOPY | Facility: HOSPITAL | Age: 57
End: 2022-04-19
Payer: MEDICARE

## 2022-04-19 ENCOUNTER — OFFICE VISIT (OUTPATIENT)
Dept: GASTROENTEROLOGY | Facility: CLINIC | Age: 57
End: 2022-04-19
Payer: MEDICARE

## 2022-04-19 VITALS
WEIGHT: 293 LBS | BODY MASS INDEX: 54.24 KG/M2 | HEART RATE: 71 BPM | SYSTOLIC BLOOD PRESSURE: 120 MMHG | DIASTOLIC BLOOD PRESSURE: 63 MMHG

## 2022-04-19 VITALS
OXYGEN SATURATION: 97 % | HEIGHT: 64 IN | BODY MASS INDEX: 50.02 KG/M2 | SYSTOLIC BLOOD PRESSURE: 134 MMHG | HEART RATE: 88 BPM | WEIGHT: 293 LBS | DIASTOLIC BLOOD PRESSURE: 82 MMHG

## 2022-04-19 DIAGNOSIS — M54.81 BILATERAL OCCIPITAL NEURALGIA: ICD-10-CM

## 2022-04-19 DIAGNOSIS — E66.01 MORBID OBESITY: ICD-10-CM

## 2022-04-19 DIAGNOSIS — K21.9 GASTROESOPHAGEAL REFLUX DISEASE WITHOUT ESOPHAGITIS: Primary | ICD-10-CM

## 2022-04-19 DIAGNOSIS — I48.0 PAROXYSMAL ATRIAL FIBRILLATION: ICD-10-CM

## 2022-04-19 DIAGNOSIS — Z12.11 SCREEN FOR COLON CANCER: ICD-10-CM

## 2022-04-19 DIAGNOSIS — E78.2 MIXED HYPERLIPIDEMIA: ICD-10-CM

## 2022-04-19 DIAGNOSIS — K21.9 GASTROESOPHAGEAL REFLUX DISEASE WITHOUT ESOPHAGITIS: ICD-10-CM

## 2022-04-19 DIAGNOSIS — E04.1 THYROID NODULE: ICD-10-CM

## 2022-04-19 DIAGNOSIS — Z86.73 HISTORY OF TIA (TRANSIENT ISCHEMIC ATTACK): ICD-10-CM

## 2022-04-19 DIAGNOSIS — I42.8 NONISCHEMIC CARDIOMYOPATHY: ICD-10-CM

## 2022-04-19 DIAGNOSIS — I10 ESSENTIAL HYPERTENSION: ICD-10-CM

## 2022-04-19 DIAGNOSIS — D51.9 ANEMIA DUE TO VITAMIN B12 DEFICIENCY, UNSPECIFIED B12 DEFICIENCY TYPE: ICD-10-CM

## 2022-04-19 DIAGNOSIS — Z95.0 PACEMAKER: ICD-10-CM

## 2022-04-19 DIAGNOSIS — Z95.810 BIVENTRICULAR AUTOMATIC IMPLANTABLE CARDIOVERTER DEFIBRILLATOR IN SITU: ICD-10-CM

## 2022-04-19 DIAGNOSIS — S06.9X0S COGNITIVE DEFICIT AS LATE EFFECT OF TRAUMATIC BRAIN INJURY: ICD-10-CM

## 2022-04-19 DIAGNOSIS — E55.9 VITAMIN D DEFICIENCY: ICD-10-CM

## 2022-04-19 DIAGNOSIS — Z86.73 HISTORY OF CVA (CEREBROVASCULAR ACCIDENT): ICD-10-CM

## 2022-04-19 DIAGNOSIS — F06.8 COGNITIVE DEFICIT AS LATE EFFECT OF TRAUMATIC BRAIN INJURY: ICD-10-CM

## 2022-04-19 DIAGNOSIS — R73.03 PREDIABETES: Primary | ICD-10-CM

## 2022-04-19 DIAGNOSIS — Z79.01 LONG TERM (CURRENT) USE OF ANTICOAGULANTS: ICD-10-CM

## 2022-04-19 DIAGNOSIS — F33.1 MODERATE EPISODE OF RECURRENT MAJOR DEPRESSIVE DISORDER: ICD-10-CM

## 2022-04-19 DIAGNOSIS — Z86.718 HISTORY OF DVT (DEEP VEIN THROMBOSIS): ICD-10-CM

## 2022-04-19 PROCEDURE — 99204 OFFICE O/P NEW MOD 45 MIN: CPT | Mod: S$PBB,,, | Performed by: INTERNAL MEDICINE

## 2022-04-19 PROCEDURE — 99204 PR OFFICE/OUTPT VISIT, NEW, LEVL IV, 45-59 MIN: ICD-10-PCS | Mod: S$PBB,,, | Performed by: INTERNAL MEDICINE

## 2022-04-19 PROCEDURE — 99999 PR PBB SHADOW E&M-EST. PATIENT-LVL V: ICD-10-PCS | Mod: PBBFAC,,, | Performed by: INTERNAL MEDICINE

## 2022-04-19 PROCEDURE — 99215 OFFICE O/P EST HI 40 MIN: CPT | Mod: PBBFAC | Performed by: INTERNAL MEDICINE

## 2022-04-19 PROCEDURE — 99999 PR PBB SHADOW E&M-EST. PATIENT-LVL V: CPT | Mod: PBBFAC,,, | Performed by: INTERNAL MEDICINE

## 2022-04-19 PROCEDURE — 99215 OFFICE O/P EST HI 40 MIN: CPT | Mod: PBBFAC,27 | Performed by: INTERNAL MEDICINE

## 2022-04-19 PROCEDURE — 99214 PR OFFICE/OUTPT VISIT, EST, LEVL IV, 30-39 MIN: ICD-10-PCS | Mod: S$PBB,,, | Performed by: INTERNAL MEDICINE

## 2022-04-19 PROCEDURE — 99214 OFFICE O/P EST MOD 30 MIN: CPT | Mod: S$PBB,,, | Performed by: INTERNAL MEDICINE

## 2022-04-19 RX ORDER — METFORMIN HYDROCHLORIDE 500 MG/1
500 TABLET ORAL 2 TIMES DAILY WITH MEALS
Qty: 180 TABLET | Refills: 3 | Status: SHIPPED | OUTPATIENT
Start: 2022-04-19 | End: 2022-06-02

## 2022-04-19 RX ORDER — OMEPRAZOLE 40 MG/1
40 CAPSULE, DELAYED RELEASE ORAL DAILY
Qty: 90 CAPSULE | Refills: 6 | Status: SHIPPED | OUTPATIENT
Start: 2022-04-19 | End: 2023-04-19 | Stop reason: SDUPTHER

## 2022-04-19 RX ORDER — ROSUVASTATIN CALCIUM 40 MG/1
40 TABLET, COATED ORAL NIGHTLY
Qty: 90 TABLET | Refills: 3 | Status: SHIPPED | OUTPATIENT
Start: 2022-04-19 | End: 2023-04-19 | Stop reason: SDUPTHER

## 2022-04-19 NOTE — TELEPHONE ENCOUNTER
Patient needs to get an EGD and colonoscopy scheduled and is currently taking Plavix.  Per Endoscopy protocol, patient needs to hold Plavix for 5 days.  Ok to proceed? Thank you.

## 2022-04-19 NOTE — PROGRESS NOTES
[facility] Consultation     Reason for Consult:  The encounter diagnosis was Gastroesophageal reflux disease without esophagitis.    PCP:   Tiffany Mina   [address] / [place] [address]    Referring MD:  Tiffany Mina Md  [address] [place] [address]    HPI:  This is a 56 y.o. female with history of CVA on plavix, history of cardiac arrest 2013 with ICD, anxiety, HTN, HLD, paroxysmal afib on anticoagulation who presents to GI clinic.  She is referred from PCP to discuss GERD symptoms.     The patient describes nausea every morning after eating breakfast, regardless of what she eats. This is occasionally associated with heartburn.    She says she had a hiatal hernia years ago and had it repaired. She said the surgery was performed at [facility].  She denies dysphagia. She has occasional vomiting and early satiety.   She takes protonix every morning with her medicines. She has been on this since 2013.  She denies unintentional weight loss.  She denies any overt GI bleeding.  She denies any family history of GI malignancy.    ROS:  Constitutional: No fevers, chills, No weight loss  ENT: No allergies  CV: No chest pain  Pulm: No cough, No shortness of breath  Ophtho: No vision changes  GI: see HPI  Derm: No rash  Heme: No lymphadenopathy, No bruising  MSK: No arthritis  : No dysuria, No hematuria  Endo: No hot or cold intolerance  Neuro: No syncope, No seizure  Psych: No anxiety, No depression    Medical History:  has a past medical history of Anticoagulant long-term use, Anxiety, Asthma, Atrial fibrillation, Brain anoxic injury, Cervicalgia (8/28/2014), CHI (closed head injury) (2/19/2013), Convulsion (5/30/2015), Decreased ROM of left shoulder (4/12/2017), Defibrillator activation (2013), Depression, Heart block, History of sudden cardiac arrest (2/2013), psychiatric care, Hypertension, Left atrial enlargement (4/11/2018), Pacemaker (2013), Paresthesia (11/1/2013),  Prolonged Q-T interval on ECG (2/8/2013), Psychiatric problem, Seizures, Sleep difficulties, Stroke, Therapy, Thyroid disease, and Upper airway resistance syndrome (2/21/2017).    Surgical History:  has a past surgical history that includes Cardiac defibrillator placement; Hiatal hernia repair; breast reduction; Tubal ligation; Cardiac defibrillator placement; Hernia repair; Carpal tunnel release (Right); Insert / replace / remove pacemaker (10/2017); Total Reduction Mammoplasty; Colonoscopy (N/A, 5/7/2019); Trigger finger release (Left, 8/2/2019); Trigger finger release (Right, 2/11/2020); Revision of implantable cardioverter-defibrillator (ICD) electrode lead placement (Left, 12/7/2020); Revision of skin pocket for cardioverter-defibrillator (12/7/2020); Transesophageal echocardiography (N/A, 12/7/2020); and Transesophageal echocardiography (N/A, 12/7/2020).    Family History: family history includes COPD in her unknown relative; Glaucoma in her maternal grandmother and paternal grandmother; Heart failure in her unknown relative; Hypertension in her mother..   No contributory family history     Social History:  reports that she has never smoked. She has never used smokeless tobacco. She reports that she does not drink alcohol and does not use drugs.    Review of patient's allergies indicates:   Allergen Reactions    Aspirin Hives    Imitrex [sumatriptan] Palpitations    Penicillins Hives and Swelling    Shellfish containing products Anaphylaxis     seafood    Reglan [metoclopramide hcl] Other (See Comments)     Parkinsonism        Current Outpatient Rx   Medication Sig Dispense Refill    (Magic mouthwash) 1:1:1 diphenhydramine(Benadryl) 12.5mg/5ml liq, aluminum & magnesium hydroxide-simethicone (Maalox), LIDOcaine viscous 2% Swish and spit 10 mLs every 4 (four) hours as needed (throat pain). for mouth sores 450 mL 0    ARIPiprazole (ABILIFY) 15 MG Tab Take 1 tablet (15 mg total) by mouth once daily. 90  tablet 1    blood sugar diagnostic Strp 1 strip by Misc.(Non-Drug; Combo Route) route 2 (two) times daily. 200 strip 3    blood-glucose meter kit Please provide with insurance covered meter 1 each 0    carvediloL (COREG) 25 MG tablet TAKE 1 TABLET(25 MG) BY MOUTH TWICE DAILY WITH MEALS 60 tablet 11    clonazePAM (KLONOPIN) 1 MG tablet Take 1 tablet (1 mg total) by mouth daily as needed for Anxiety. 20 tablet 3    clopidogreL (PLAVIX) 75 mg tablet Take 1 tablet (75 mg total) by mouth once daily. 90 tablet 3    cyanocobalamin, vitamin B-12, 1,000 mcg Subl Place 1,000 mcg under the tongue once daily. 90 tablet 3    diclofenac sodium (VOLTAREN) 1 % Gel Apply 2 g topically 4 (four) times daily. 100 g 1    donepezil (ARICEPT) 10 MG tablet Take 1 tablet (10 mg total) by mouth 2 (two) times daily. 180 tablet 3    etodolac (LODINE) 500 MG tablet Take 1 tablet (500 mg total) by mouth 2 (two) times daily as needed (migraine). 30 tablet 12    flurbiprofen (ANSAID) 100 MG tablet Take 1 tablet (100 mg total) by mouth 2 (two) times daily as needed (migraine). 12 tablet 12    fluticasone propionate (FLONASE) 50 mcg/actuation nasal spray 1 spray (50 mcg total) by Each Nostril route once daily. 16 g 3    fremanezumab-vfrm (AJOVY) 225 mg/1.5 mL injection Inject 1.5 mLs (225 mg total) into the skin every 28 days. 1 each 12    furosemide (LASIX) 20 MG tablet Take 1 tablet (20 mg total) by mouth once daily. 30 tablet 6    gabapentin (NEURONTIN) 300 MG capsule Take 3 capsules (900 mg total) by mouth 3 (three) times daily. 810 capsule 12    lancets Misc 1 Device by Misc.(Non-Drug; Combo Route) route 2 (two) times daily. 200 each 3    losartan (COZAAR) 100 MG tablet Take 1 tablet (100 mg total) by mouth once daily. 90 tablet 3    magnesium 200 mg Tab Take 200 mg by mouth once daily. (Patient taking differently: Take 200 mg by mouth every other day.) 30 each 12    metFORMIN (GLUCOPHAGE) 500 MG tablet Take 1 tablet (500 mg  total) by mouth 2 (two) times daily with meals. 180 tablet 3    mupirocin (BACTROBAN) 2 % ointment Apply to affected area 3 times daily 22 g 1    rivaroxaban (XARELTO) 20 mg Tab TAKE 1 TABLET BY MOUTH DAILY EVENING MEAL WITH DINNER 30 tablet 11    rosuvastatin (CRESTOR) 40 MG Tab Take 1 tablet (40 mg total) by mouth every evening. 90 tablet 3    sertraline (ZOLOFT) 100 MG tablet Take 0.5 tablet by mouth daily x 5 days then increase to 1 tablet daily 90 tablet 3    silver sulfADIAZINE 1% (SILVADENE) 1 % cream Apply topically 2 (two) times daily. 50 g 0    tiaGABine (GABITRIL) 4 MG tablet Take 1 tablet (4 mg total) by mouth every evening. 30 tablet 12    tiZANidine (ZANAFLEX) 4 MG tablet TAKE 1 TABLET(4 MG) BY MOUTH EVERY 6 HOURS AS NEEDED 90 tablet 12    tiZANidine (ZANAFLEX) 4 MG tablet Take 1 tablet (4 mg total) by mouth every 6 (six) hours as needed. 90 tablet 12    tiZANidine (ZANAFLEX) 4 MG tablet Take 1 tablet (4 mg total) by mouth every 6 (six) hours as needed. 90 tablet 12    TRUE METRIX GLUCOSE METER Misc TEST BG BID  0    ubrogepant (UBROGEPANT) 100 mg tablet Take 1 tablet (100 mg total) by mouth 2 (two) times daily as needed for Migraine. 10 tablet 12    zolpidem (AMBIEN) 10 mg Tab Take 1 tablet (10 mg total) by mouth nightly as needed (insomnia). 30 tablet 5    EPINEPHrine (EPIPEN) 0.3 mg/0.3 mL AtIn Inject 0.3 mLs (0.3 mg total) into the muscle once. for 1 dose 0.3 mL 0    omeprazole (PRILOSEC) 40 MG capsule Take 1 capsule (40 mg total) by mouth once daily. Take 30 minutes before breakfast 90 capsule 6    ondansetron (ZOFRAN-ODT) 4 MG TbDL Take 1 tablet (4 mg total) by mouth every 12 (twelve) hours as needed (nausea). 40 tablet 12    ondansetron (ZOFRAN-ODT) 4 MG TbDL Take 1 tablet (4 mg total) by mouth every 12 (twelve) hours as needed (nausea). 40 tablet 12       Objective Findings:    Vital Signs:  /63   Pulse 71   Wt (!) 143.3 kg (316 lb)   LMP 02/04/2016 (Approximate)   BMI  54.24 kg/m²   Body mass index is 54.24 kg/m².    Physical Exam:  General Appearance: well-appearing, NAD  Head:   Normocephalic, without obvious abnormality  Eyes:    No scleral icterus, EOMI  ENT: Neck supple; moist mucus membranes  Lungs: clear to auscultation bilaterally.  Heart:  regular rate and rhythm, S1, S2 normal, no murmurs heard  Abdomen: soft, non-tender, non-distended with bowel sounds in all four quadrants. No hepatosplenomegaly, ascites, or palpable masses  Extremities: 2+ pulses, no clubbing, cyanosis or edema  Skin: No visible rash  Neurologic: no focal motor deficits      Labs:  Lab Results   Component Value Date    WBC 2.65 (L) 04/12/2022    HGB 10.7 (L) 04/12/2022    HCT 34.4 (L) 04/12/2022     04/12/2022    CHOL 110 (L) 04/12/2022    TRIG 60 04/12/2022    HDL 38 (L) 04/12/2022    ALT 14 04/12/2022    AST 14 04/12/2022     04/12/2022    K 4.2 04/12/2022     04/12/2022    CREATININE 0.8 04/12/2022    BUN 12 04/12/2022    CO2 25 04/12/2022    TSH 1.533 04/12/2022    INR 1.0 11/12/2020    HGBA1C 6.4 (H) 04/12/2022       PROCEDURES:  COLONOSCOPY 2019  - Diverticulosis in the descending colon.                         - One 10 mm polyp in the ascending colon, removed                         with a hot snare. Resected and retrieved.                         - One 5 mm polyp in the transverse colon, removed                         with a cold snare. Resected and retrieved.                         - The examination was otherwise normal on direct                         and retroflexion views.   1. Colon, ascending, polyp, biopsy:  - Tubulovillous adenoma.  2. Colon, transverse, polyp, biopsy:  - Tubular adenoma.    EGD 2014  The esophagus was normal.        A small hiatus hernia was present.        The stomach was normal.        The cardia and gastric fundus were normal on retroflexion.     COLONOSCOPY 2014  - There was significant looping of the colon.                         - The  examination was otherwise normal on direct                         and retroflexion views.       Assessment:  This is a 56 y.o. female with history of CVA on plavix, history of cardiac arrest 2013 with ICD, anxiety, HTN, HLD, paroxysmal afib on anticoagulation who presents to GI clinic.    Recommendations:  Longstanding GERD  Nausea  Normocytic Anemia  Borderline low vitamin B12  History of hiatal hernia repair  -Case request placed for EGD; perform stomach biopsies (evaluate for hpylori)  -Switch from protonix to omeprazole 40 mg once daily 30 minutes before breakfast  -Counseled extensively on weight loss    History of colon polyps  Colonoscopy 2019 with TVA and TA  Case request placed for colonoscopy by PCP      Order summary:  Orders Placed This Encounter    omeprazole (PRILOSEC) 40 MG capsule    Case Request Endoscopy: EGD (ESOPHAGOGASTRODUODENOSCOPY)         Thank you so much for allowing me to participate in the care of Amna Thomas MD  Gastroenterology   [facility]

## 2022-04-19 NOTE — PROGRESS NOTES
INTERNAL MEDICINE ESTABLISHED PATIENT VISIT NOTE    Subjective:     Chief Complaint: Follow-up  HTN, HLD, preDM, lab results     Patient ID: Amna Chawla is a 56 y.o. female with hx CVA and TIA c residual cognitive deficits (most recently c TIA 9/2018 per pt, has mild B weakness from several strokes in the past), carotid a disease, HTN, paroxysmal A fib c LAE, hx sudden cardiac arrest c VF and no ischemic heart disease and preserved EF (2013), intermittent 3rd deg AV block and symptomatic LVEF of 40% in setting of normal PET stress and chronic RV pacing s/p CRT-D, HLD, thyroid nodule s/p FNA 7/2018 c path c/w benign follicular nodule, depression with anxiety followed by psych, chronic complex h/a c occipital neuralgia followed by Neuro and on mult meds and has also had tx c botox, GERD c hiatal hernia, B carpal tunnel s/p release B, hx L middle ring finger trigger finger s/p release, SHIRA on APAP 6-20cm followed in sleep clinic, insomnia, prediabetes, last seen by me in Oct, here today for f/u HTN, HLD.    Had ED visit in Dec 2/2/ chest pain.  EKG at that time c atrial sensed V paced rhythm but unchanged.  CXR c mild pulm edema.  Troponin and BNP normal and was COVID neg.  Was discharged from ED c plan for outpatient PET stress which showed no significant perfusion abnormalities.    Still seeing Dr. Cortez for her migraines   Also seeing Dr. Bonilla for sciatic sx.    Was also seeing ENT due to epistaxis and had cauterization done last month.    States she stopped taking Metformin several weeks ago due to GI s/e.  States her insurance is not covering her B12 injections or Ergocalciferol so has not been taking it.  Still c chronic pain issues and followed by in the spine clinic.    Past Medical History:  Past Medical History:   Diagnosis Date    Anticoagulant long-term use     Anxiety     Asthma     Atrial fibrillation     Brain anoxic injury     Cervicalgia 8/28/2014    CHI (closed head injury) 2/19/2013     Convulsion 5/30/2015    Decreased ROM of left shoulder 4/12/2017    Defibrillator activation 2013    Depression     Heart block     History of sudden cardiac arrest 2/2013    PEA arrest with subsequent long-QT    Hx of psychiatric care     Hypertension     Left atrial enlargement 4/11/2018    Pacemaker 2013    Paresthesia 11/1/2013    Prolonged Q-T interval on ECG 2/8/2013    Psychiatric problem     Seizures     Sleep difficulties     Stroke     weakness lt side    Therapy     Thyroid disease     Upper airway resistance syndrome 2/21/2017       Home Medications:  Prior to Admission medications    Medication Sig Start Date End Date Taking? Authorizing Provider   (Magic mouthwash) 1:1:1 diphenhydramine(Benadryl) 12.5mg/5ml liq, aluminum & magnesium hydroxide-simethicone (Maalox), LIDOcaine viscous 2% Swish and spit 10 mLs every 4 (four) hours as needed (throat pain). for mouth sores 1/21/22  Yes Jersey Mclaughlin MD   ARIPiprazole (ABILIFY) 15 MG Tab Take 1 tablet (15 mg total) by mouth once daily. 5/10/21  Yes Kade Huffman III, NP   blood sugar diagnostic Strp 1 strip by Misc.(Non-Drug; Combo Route) route 2 (two) times daily. 5/20/19  Yes Tiffany Mina MD   blood-glucose meter kit Please provide with insurance covered meter 5/20/19  Yes Tiffany Mina MD   carvediloL (COREG) 25 MG tablet TAKE 1 TABLET(25 MG) BY MOUTH TWICE DAILY WITH MEALS 7/12/21  Yes Avelino Mejia MD   clonazePAM (KLONOPIN) 1 MG tablet Take 1 tablet (1 mg total) by mouth daily as needed for Anxiety. 12/23/21 12/23/22 Yes Kade Huffman III, NP   clopidogreL (PLAVIX) 75 mg tablet Take 1 tablet (75 mg total) by mouth once daily. 2/15/22  Yes Alok Grossman MD   cyanocobalamin, vitamin B-12, 1,000 mcg Subl Place 1,000 mcg under the tongue once daily. 4/27/21  Yes Tiffany Mina MD   diclofenac sodium (VOLTAREN) 1 % Gel Apply 2 g topically 4 (four) times daily. 11/5/20  Yes Mariel Tomlinson NP   donepezil (ARICEPT) 10 MG tablet Take 1  tablet (10 mg total) by mouth 2 (two) times daily. 7/18/17  Yes Toby Paredes MD   ergocalciferol (ERGOCALCIFEROL) 50,000 unit Cap Take 1 capsule (50,000 Units total) by mouth twice a week. 10/7/21  Yes Tiffany Mina MD   etodolac (LODINE) 500 MG tablet Take 1 tablet (500 mg total) by mouth 2 (two) times daily as needed (migraine). 1/21/21  Yes David Cortez MD   flurbiprofen (ANSAID) 100 MG tablet Take 1 tablet (100 mg total) by mouth 2 (two) times daily as needed (migraine). 10/1/19  Yes David Cortez MD   fluticasone propionate (FLONASE) 50 mcg/actuation nasal spray 1 spray (50 mcg total) by Each Nostril route once daily. 10/19/21  Yes Tiffany Mina MD   fremanezumab-vfrm (AJOVY) 225 mg/1.5 mL injection Inject 1.5 mLs (225 mg total) into the skin every 28 days. 2/17/22  Yes David Cortez MD   furosemide (LASIX) 20 MG tablet Take 1 tablet (20 mg total) by mouth once daily. 6/22/21 6/22/22 Yes Rin Martins MD   gabapentin (NEURONTIN) 300 MG capsule Take 3 capsules (900 mg total) by mouth 3 (three) times daily. 11/30/16  Yes David Cortez MD   lancets Misc 1 Device by Misc.(Non-Drug; Combo Route) route 2 (two) times daily. 5/20/19  Yes Tiffany Mina MD   losartan (COZAAR) 100 MG tablet Take 1 tablet (100 mg total) by mouth once daily. 4/14/22 4/14/23 Yes Tiffany Mina MD   magnesium 200 mg Tab Take 200 mg by mouth once daily.  Patient taking differently: Take 200 mg by mouth every other day. 3/7/18  Yes David Cortez MD   mupirocin (BACTROBAN) 2 % ointment Apply to affected area 3 times daily 9/16/21  Yes Tiffany Mina MD   ondansetron (ZOFRAN-ODT) 4 MG TbDL Take 1 tablet (4 mg total) by mouth every 12 (twelve) hours as needed (nausea). 6/23/21  Yes David Cortez MD   ondansetron (ZOFRAN-ODT) 4 MG TbDL Take 1 tablet (4 mg total) by mouth every 12 (twelve) hours as needed (nausea). 6/23/21  Yes David Cortez MD   pantoprazole (PROTONIX) 40 MG tablet Take 1 tablet (40 mg total) by mouth once daily. 4/14/22  Yes Le  MD Keeley   predniSONE (DELTASONE) 10 MG tablet 3 tablets daily x 2 days, 2 tablets daily x 2 days, 1 tablet daily x 2 days 12/2/21  Yes Yahaira Santos PA-C   rivaroxaban (XARELTO) 20 mg Tab TAKE 1 TABLET BY MOUTH DAILY EVENING MEAL WITH DINNER 2/14/22  Yes Avelino Mejia MD   rosuvastatin (CRESTOR) 40 MG Tab TAKE 1 TABLET(40 MG) BY MOUTH EVERY EVENING 6/9/21  Yes Alexander Milton MD   sertraline (ZOLOFT) 100 MG tablet Take 0.5 tablet by mouth daily x 5 days then increase to 1 tablet daily 5/10/21  Yes Kade Huffman III, IDA   silver sulfADIAZINE 1% (SILVADENE) 1 % cream Apply topically 2 (two) times daily. 1/4/22  Yes Tiffany Mina MD   tiaGABine (GABITRIL) 4 MG tablet Take 1 tablet (4 mg total) by mouth every evening. 8/3/20  Yes David Cortez MD   tiZANidine (ZANAFLEX) 4 MG tablet TAKE 1 TABLET(4 MG) BY MOUTH EVERY 6 HOURS AS NEEDED 12/14/21  Yes David Cortez MD   tiZANidine (ZANAFLEX) 4 MG tablet Take 1 tablet (4 mg total) by mouth every 6 (six) hours as needed. 12/21/21  Yes David Cortez MD   tiZANidine (ZANAFLEX) 4 MG tablet Take 1 tablet (4 mg total) by mouth every 6 (six) hours as needed. 12/10/21  Yes David Cortez MD   TRUE METRIX GLUCOSE METER Misc TEST BG BID 5/30/19  Yes Historical Provider   ubrogepant (UBROGEPANT) 100 mg tablet Take 1 tablet (100 mg total) by mouth 2 (two) times daily as needed for Migraine. 6/22/21  Yes David Cortez MD   zolpidem (AMBIEN) 10 mg Tab Take 1 tablet (10 mg total) by mouth nightly as needed (insomnia). 11/24/21  Yes Kade Huffman III, IDA   EPINEPHrine (EPIPEN) 0.3 mg/0.3 mL AtIn Inject 0.3 mLs (0.3 mg total) into the muscle once. for 1 dose 12/2/21 12/2/21  Yahaira Santos PA-C   metFORMIN (GLUCOPHAGE) 1000 MG tablet Take 1 tablet (1,000 mg total) by mouth 2 (two) times daily with meals. 4/12/21 4/12/22  Tiffany Mina MD       Allergies:  Review of patient's allergies indicates:   Allergen Reactions    Aspirin Hives    Imitrex [sumatriptan] Palpitations     "Penicillins Hives and Swelling    Shellfish containing products Anaphylaxis     seafood    Reglan [metoclopramide hcl] Other (See Comments)     Parkinsonism        Social History:  Social History     Tobacco Use    Smoking status: Never Smoker    Smokeless tobacco: Never Used   Substance Use Topics    Alcohol use: No    Drug use: No        Review of Systems   Constitutional: Negative for appetite change, chills, fatigue, fever and unexpected weight change.   HENT: Negative for congestion, hearing loss and rhinorrhea.    Eyes: Negative for pain and visual disturbance.   Respiratory: Negative for cough, chest tightness, shortness of breath and wheezing.    Cardiovascular: Negative for chest pain, palpitations and leg swelling.   Gastrointestinal: Negative for abdominal distention and abdominal pain.   Endocrine: Negative for polydipsia and polyuria.   Genitourinary: Negative for decreased urine volume, difficulty urinating, dysuria, hematuria and vaginal discharge.   Musculoskeletal: Positive for arthralgias, back pain and neck pain.   Neurological: Positive for headaches. Negative for weakness and numbness.   Psychiatric/Behavioral: Negative for behavioral problems and confusion.         Health Maintenance:     Immunizations:   Influenza 10/2021  TDap 10/2/2018  Pneumovax 9/2018 here per pt  Shingrix 10/2019, 3/2020 completed.  Moderna COVID 2/2021, 3/2021, 1/2022, can repeat in June.     Cancer Screening:  PAP: 9/2019 c Dr. Marroquin wnl, rec f/u  Mammogram:  12/2021 benign  Colonoscopy:  5/2019, polyps x2 removed, tubular adenoma dn tubulovillous adenoma on path c rec repeat in 3 yrs per report.  will order.    Objective:   /82 (BP Location: Left arm, Patient Position: Sitting, BP Method: Large (Manual))   Pulse 88   Ht 5' 4" (1.626 m)   Wt (!) 144 kg (317 lb 7.4 oz)   LMP 02/04/2016 (Approximate)   SpO2 97%   BMI 54.49 kg/m²        General: AAO x3, no apparent distress  HEENT: PERRL, OP clear  CV: " RRR, no m/r/g  Pulm: Lungs CTAB, no crackles, no wheezes  Abd: s/NT/ND +BS  Extremities: no c/c/e    Labs:     Lab Results   Component Value Date    WBC 2.65 (L) 04/12/2022    HGB 10.7 (L) 04/12/2022    HCT 34.4 (L) 04/12/2022    MCV 95 04/12/2022     04/12/2022       Lab Results   Component Value Date    IRON 80 01/18/2019    TIBC 337 01/18/2019    FERRITIN 59 01/18/2019     Lab Results   Component Value Date    MRQABQKB11 384 04/12/2022       Sodium   Date Value Ref Range Status   04/12/2022 138 136 - 145 mmol/L Final     Potassium   Date Value Ref Range Status   04/12/2022 4.2 3.5 - 5.1 mmol/L Final     Chloride   Date Value Ref Range Status   04/12/2022 105 95 - 110 mmol/L Final     CO2   Date Value Ref Range Status   04/12/2022 25 23 - 29 mmol/L Final     Glucose   Date Value Ref Range Status   04/12/2022 100 70 - 110 mg/dL Final     BUN   Date Value Ref Range Status   04/12/2022 12 6 - 20 mg/dL Final     Creatinine   Date Value Ref Range Status   04/12/2022 0.8 0.5 - 1.4 mg/dL Final     Calcium   Date Value Ref Range Status   04/12/2022 9.0 8.7 - 10.5 mg/dL Final     Total Protein   Date Value Ref Range Status   04/12/2022 10.2 (H) 6.0 - 8.4 g/dL Final     Albumin   Date Value Ref Range Status   04/12/2022 3.2 (L) 3.5 - 5.2 g/dL Final     Total Bilirubin   Date Value Ref Range Status   04/12/2022 0.3 0.1 - 1.0 mg/dL Final     Comment:     For infants and newborns, interpretation of results should be based  on gestational age, weight and in agreement with clinical  observations.    Premature Infant recommended reference ranges:  Up to 24 hours.............<8.0 mg/dL  Up to 48 hours............<12.0 mg/dL  3-5 days..................<15.0 mg/dL  6-29 days.................<15.0 mg/dL       Alkaline Phosphatase   Date Value Ref Range Status   04/12/2022 56 55 - 135 U/L Final     AST   Date Value Ref Range Status   04/12/2022 14 10 - 40 U/L Final     ALT   Date Value Ref Range Status   04/12/2022 14 10 - 44  U/L Final     Anion Gap   Date Value Ref Range Status   04/12/2022 8 8 - 16 mmol/L Final     eGFR if    Date Value Ref Range Status   04/12/2022 >60.0 >60 mL/min/1.73 m^2 Final     eGFR if non    Date Value Ref Range Status   04/12/2022 >60.0 >60 mL/min/1.73 m^2 Final     Comment:     Calculation used to obtain the estimated glomerular filtration  rate (eGFR) is the CKD-EPI equation.        Lab Results   Component Value Date    HGBA1C 6.4 (H) 04/12/2022     Lab Results   Component Value Date    LDLCALC 60.0 (L) 04/12/2022     Lab Results   Component Value Date    TSH 1.533 04/12/2022         Assessment/Plan     Amna was seen today for follow-up.    Diagnoses and all orders for this visit:    Prediabetes  Lab Results   Component Value Date    HGBA1C 6.4 (H) 04/12/2022     Worse on recent labs but has not been on Metformin due to GI s/e  Will decrease dose to 500 BID and reassess at f/u  -     metFORMIN (GLUCOPHAGE) 500 MG tablet; Take 1 tablet (500 mg total) by mouth 2 (two) times daily with meals.    Essential hypertension  at goal.  Cont current medications.    Mixed hyperlipidemia  Lab Results   Component Value Date    LDLCALC 60.0 (L) 04/12/2022     at goal.  Cont current medications.  -     rosuvastatin (CRESTOR) 40 MG Tab; Take 1 tablet (40 mg total) by mouth every evening.    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  Based on recent numbers, rec otc oral B12 50 mcg daily    Gastroesophageal reflux disease without esophagitis  Chronic cough at night despite PPI  Consider change in PPI but pt requesting f/u c GI  -     Ambulatory referral/consult to Gastroenterology; Future    History of DVT (deep vein thrombosis)  No acute issues  Cont Xarelto (also c A fib)    Morbid obesity  Counseled extensively on need for diet changes and daily exercise.  Discussed examples of healthy eating.  Recommend at least 30 minutes of exercise at least 5 days a week.      Moderate episode of recurrent major depressive disorder  Followed by Psych, cont f/u    Bilateral occipital neuralgia  Followed by Neuro, cont meds/fu    Vitamin D deficiency  suboptimal on last check but states Ergocalciferol not covered, rec otc D3 5000 daily    Thyroid nodule  Due for f/u u/s, advised to schedule (ordered at last visit)    Screen for colon cancer  -     Case Request Endoscopy: COLONOSCOPY      HM as above  RTC in 6 mos, sooner if needed.    Tiffany Mina MD  Department of Internal Medicine - [facility]  04/19/2022

## 2022-04-19 NOTE — TELEPHONE ENCOUNTER
Pt needs an EGD and colonoscopy scheduled and is currently taking Xarelto.  Per Endoscopy protocol, pt needs to hold Xarelto for 2 days. Ok to proceed? Thank you.

## 2022-04-19 NOTE — PATIENT INSTRUCTIONS
Over the counter:  Start Vitamin B12 50 mcg daily.  Start Vitamin D3 5000 IU daily.    A referral for a colonoscopy has been placed.  Please call [phone] to schedule for May.    Replace sugar with Stevia (or Truvia).  Try almond milk instead of creamer.

## 2022-04-25 ENCOUNTER — TELEPHONE (OUTPATIENT)
Dept: ENDOSCOPY | Facility: HOSPITAL | Age: 57
End: 2022-04-25
Payer: MEDICARE

## 2022-04-25 ENCOUNTER — PATIENT MESSAGE (OUTPATIENT)
Dept: INTERNAL MEDICINE | Facility: CLINIC | Age: 57
End: 2022-04-25
Payer: MEDICARE

## 2022-04-25 ENCOUNTER — PATIENT MESSAGE (OUTPATIENT)
Dept: ENDOSCOPY | Facility: HOSPITAL | Age: 57
End: 2022-04-25
Payer: MEDICARE

## 2022-04-25 DIAGNOSIS — Z12.11 SPECIAL SCREENING FOR MALIGNANT NEOPLASMS, COLON: Primary | ICD-10-CM

## 2022-04-25 RX ORDER — POLYETHYLENE GLYCOL 3350, SODIUM SULFATE ANHYDROUS, SODIUM BICARBONATE, SODIUM CHLORIDE, POTASSIUM CHLORIDE 236; 22.74; 6.74; 5.86; 2.97 G/4L; G/4L; G/4L; G/4L; G/4L
4 POWDER, FOR SOLUTION ORAL ONCE
Qty: 4000 ML | Refills: 0 | Status: SHIPPED | OUTPATIENT
Start: 2022-04-25 | End: 2022-04-25

## 2022-04-25 NOTE — TELEPHONE ENCOUNTER
Alok Grossman MD  to [facility] Alok Staff          7:24 AM  Yes she can hold Plavix.     April 20, 2022         NL    11:33 AM  You routed this conversation to [facility]        [facility]    11:33 AM  Note  Please clarify if patient can hold Plavix 5 days prior to EGD and colonoscopy.  Thank you.           Alok Grossman MD  [facility]          10:12 AM  Yes it is okay to hold for 3 days prior to EGD. She can restart 2 days after procedure if she had biopsy and if no procedure she can restart same day.     April 19, 2022         NL    11:07 AM  You routed this conversation to [facility]        [facility]    11:07 AM  Note  Patient needs to get an EGD and colonoscopy scheduled and is currently taking Plavix.  Per Endoscopy protocol, patient needs to hold Plavix for 5 days.  Ok to proceed? Thank you.           Chelsea Reveles RN  to Me    MR      4/19/22 11:44 AM                                 It has been determined, per the current guidelines, that the above patient has been cleared to hold Xarelto  for 2 days prior to the scheduled procedure, and should be resumed post procedure as soon as possible per the surgeon's discretion.  If you have any further questions please contact our office at [phone].     Thank you,     RODRIGO Tran                    4/19/22 11:14 AM  Delmis Brown MA routed this conversation to Chelsea Reveles RN             NL    4/19/22 11:06 AM  You routed this conversation to MD Chin Cook Staff        [facility]    4/19/22 11:06 AM  Note  Pt needs an EGD and colonoscopy scheduled and is currently taking Xarelto.  Per Endoscopy protocol, pt needs to hold Xarelto for 2 days. Ok to proceed? Thank you.

## 2022-04-27 ENCOUNTER — PATIENT MESSAGE (OUTPATIENT)
Dept: NEUROLOGY | Facility: CLINIC | Age: 57
End: 2022-04-27
Payer: MEDICARE

## 2022-04-27 DIAGNOSIS — G43.711 CHRONIC MIGRAINE WITHOUT AURA, WITH INTRACTABLE MIGRAINE, SO STATED, WITH STATUS MIGRAINOSUS: Primary | ICD-10-CM

## 2022-04-28 ENCOUNTER — CLINICAL SUPPORT (OUTPATIENT)
Dept: NEUROLOGY | Facility: CLINIC | Age: 57
End: 2022-04-28
Payer: MEDICARE

## 2022-04-28 ENCOUNTER — OFFICE VISIT (OUTPATIENT)
Dept: PSYCHIATRY | Facility: CLINIC | Age: 57
End: 2022-04-28
Payer: MEDICARE

## 2022-04-28 DIAGNOSIS — F33.1 DEPRESSION, MAJOR, RECURRENT, MODERATE: ICD-10-CM

## 2022-04-28 DIAGNOSIS — F41.9 ANXIETY: ICD-10-CM

## 2022-04-28 DIAGNOSIS — G43.711 CHRONIC MIGRAINE WITHOUT AURA, WITH INTRACTABLE MIGRAINE, SO STATED, WITH STATUS MIGRAINOSUS: Primary | ICD-10-CM

## 2022-04-28 DIAGNOSIS — F43.21 GRIEF: Primary | ICD-10-CM

## 2022-04-28 DIAGNOSIS — G47.00 INSOMNIA, UNSPECIFIED TYPE: ICD-10-CM

## 2022-04-28 PROCEDURE — 90833 PR PSYCHOTHERAPY W/PATIENT W/E&M, 30 MIN (ADD ON): ICD-10-PCS | Mod: 95,,, | Performed by: NURSE PRACTITIONER

## 2022-04-28 PROCEDURE — 99214 OFFICE O/P EST MOD 30 MIN: CPT | Mod: 95,,, | Performed by: NURSE PRACTITIONER

## 2022-04-28 PROCEDURE — 99214 PR OFFICE/OUTPT VISIT, EST, LEVL IV, 30-39 MIN: ICD-10-PCS | Mod: 95,,, | Performed by: NURSE PRACTITIONER

## 2022-04-28 PROCEDURE — 99213 OFFICE O/P EST LOW 20 MIN: CPT | Mod: PBBFAC

## 2022-04-28 PROCEDURE — 90833 PSYTX W PT W E/M 30 MIN: CPT | Mod: 95,,, | Performed by: NURSE PRACTITIONER

## 2022-04-28 PROCEDURE — 99999 PR PBB SHADOW E&M-EST. PATIENT-LVL III: CPT | Mod: PBBFAC,,,

## 2022-04-28 PROCEDURE — 99999 PR PBB SHADOW E&M-EST. PATIENT-LVL III: ICD-10-PCS | Mod: PBBFAC,,,

## 2022-04-28 RX ORDER — ARIPIPRAZOLE 15 MG/1
15 TABLET ORAL DAILY
Qty: 90 TABLET | Refills: 1 | Status: SHIPPED | OUTPATIENT
Start: 2022-04-28 | End: 2023-06-15 | Stop reason: SDUPTHER

## 2022-04-28 RX ORDER — CLONAZEPAM 1 MG/1
1 TABLET ORAL DAILY PRN
Qty: 30 TABLET | Refills: 5 | Status: SHIPPED | OUTPATIENT
Start: 2022-04-28 | End: 2022-11-08

## 2022-04-28 RX ORDER — SERTRALINE HYDROCHLORIDE 100 MG/1
150 TABLET, FILM COATED ORAL DAILY
Qty: 135 TABLET | Refills: 3 | Status: SHIPPED | OUTPATIENT
Start: 2022-04-28 | End: 2023-06-15 | Stop reason: SDUPTHER

## 2022-04-28 RX ORDER — KETOROLAC TROMETHAMINE 30 MG/ML
60 INJECTION, SOLUTION INTRAMUSCULAR; INTRAVENOUS
Status: DISCONTINUED | OUTPATIENT
Start: 2022-04-28 | End: 2023-08-16

## 2022-04-28 RX ORDER — TRAZODONE HYDROCHLORIDE 100 MG/1
100 TABLET ORAL NIGHTLY
Qty: 30 TABLET | Refills: 11 | Status: SHIPPED | OUTPATIENT
Start: 2022-04-28 | End: 2023-06-15

## 2022-04-28 RX ORDER — ZOLPIDEM TARTRATE 10 MG/1
10 TABLET ORAL NIGHTLY PRN
Qty: 30 TABLET | Refills: 5 | Status: SHIPPED | OUTPATIENT
Start: 2022-04-28 | End: 2022-11-08 | Stop reason: SDUPTHER

## 2022-04-28 NOTE — PROGRESS NOTES
Patient reports HA pain 10/10, stabbing to entire head.  States HA started on 22 after her sister  in a car accident.  60mg/2ml Toradol IM administered to right Dorsogluteal site without difficulty.  Patient tolerated well.  Instructed patient to go to urgent care or ED if HA symptoms worsen.

## 2022-04-28 NOTE — TELEPHONE ENCOUNTER
Currently offsite at [facility] . Nicholas, RN agreeable to giving medication in nurse visit. Called patient and confirmed 2:40p appt today. Ensured that she will arrive prior to 3pm.

## 2022-04-28 NOTE — PROGRESS NOTES
Outpatient Psychiatry Follow-Up Visit (MD/NP)    2022    Clinical Status of Patient:  Outpatient (Ambulatory)    Chief Complaint:  Amna Chawla is a 56 y.o. female who presents today for follow-up of depression and anxiety.  Met with patient.      Last Visit:  was on 5/10/21. Chart and  reviewed.   The patient location is: home  The chief complaint leading to consultation is: grief    Visit type: audiovisual    Face to Face time with patient: 28 minutes  30 minutes of total time spent on the encounter, which includes face to face time and non-face to face time preparing to see the patient (eg, review of tests), Obtaining and/or reviewing separately obtained history, Documenting clinical information in the electronic or other health record, Independently interpreting results (not separately reported) and communicating results to the patient/family/caregiver, or Care coordination (not separately reported).     Each patient to whom he or she provides medical services by telemedicine is:  (1) informed of the relationship between the physician and patient and the respective role of any other health care provider with respect to management of the patient; and (2) notified that he or she may decline to receive medical services by telemedicine and may withdraw from such care at any time.    Current Medication Profile for Psych  Interval History and Content of Current Session:  · Increase to Zoloft 50 mg x 5 days then increase to 100 mg daily   · Continue Abilify 15 mg po daily  · Continue Ambien 10 mg po q sh PRN insomnia (Trazodone and  Zaleplon not effective)  · Continue Klonopin 1 mg po daily PRN severe anxiety    Virtual Visit:Pt reports sister  last  in MVA with a drunk . Reports depression and anxiety sx related to grief. Reports trouble sleeping. Will add Trazodone. Will increase Zoloft to 150 mg. Encouraged pt to schedule appointment with Dr. Monroe. Reports compliance with medications  and denies side effects. Denies SI/HI/AVH.     Psychotherapy:  · Target symptoms: depression, anxiety   · Why chosen therapy is appropriate versus another modality: relevant to diagnosis  · Outcome monitoring methods: self-report, observation  · Therapeutic intervention type: insight oriented psychotherapy, CBT  · Topics discussed/themes: relationships difficulties, parenting issues, stress related to medical comorbidities, difficulty managing affect in interpersonal relationships, building skills sets for symptom management, symptom recognition  · The patient's response to the intervention is accepting. The patient's progress toward treatment goals is not progressing.   · Duration of intervention: 20 minutes.    Review of Systems   · PSYCHIATRIC: Pertinant items are noted in the narrative.  · CONSTITUTIONAL: No weight gain or loss.   · ENDOCRINE: No polydipsia or polyuria.  · INTEGUMENTARY: No rashes or lacerations.  · EYES: No exophthalmos, jaundice or blindness.  · ENT: No dizziness, tinnitus or hearing loss.  · RESPIRATORY: No shortness of breath.  · GASTROINTESTINAL: No nausea, vomiting, pain, constipation or diarrhea.  · GENITOURINARY: No frequency, dysuria or sexual dysfunction.  · HEMATOLOGIC/LYMPHATIC: No excessive bleeding, prolonged or excessive bleeding after dental extraction/injury.  · ALLERGIC/IMMUNOLOGIC: No allergic response to materials, foods or animals at this time.    Past Medical, Family and Social History: The patient's past medical, family and social history have been reviewed and updated as appropriate within the electronic medical record - see encounter notes.    Compliance: yes    Side effects: None    Risk Parameters:  Patient reports no suicidal ideation  Patient reports no homicidal ideation  Patient reports no self-injurious behavior  Patient reports no violent behavior    Exam (detailed: at least 9 elements; comprehensive: all 15 elements)   Constitutional  Vitals:  Most recent vital  signs, dated less than 90 days prior to this appointment, were reviewed.   There were no vitals filed for this visit.     General:  NA     Musculoskeletal  Muscle Strength/Tone:  not examined   Gait & Station:  NA     Psychiatric  Speech:  no latency; no press   Mood & Affect:  dysthymic, sad  irritable   Thought Process:  normal and logical   Associations:  intact   Thought Content:  normal, no suicidality, no homicidality, delusions, or paranoia   Insight:  has awareness of illness   Judgement: behavior is adequate to circumstances, age appropriate   Orientation:  grossly intact, person, place, situation, time/date   Memory: intact for content of interview, able to remember recent events- yes, able to remember remote events- yes   Language: grossly intact   Attention Span & Concentration:  able to focus   Fund of Knowledge:  intact and appropriate to age and level of education, familiar with aspects of current personal life     Assessment and Diagnosis   Status/Progress: Based on the examination today, the patient's problem(s) is/are worsening.  New problems have been presented today (grief).   Co-morbidities and Lack of compliance are not complicating management of the primary condition.  There are no active rule-out diagnoses for this patient at this time.     General Impression:       ICD-10-CM ICD-9-CM   1. Grief  F43.21 309.0   2. Depression, major, recurrent, moderate  F33.1 296.32   3. Insomnia, unspecified type  G47.00 780.52   4. Anxiety  F41.9 300.00       Intervention/Counseling/Treatment Plan   · Medication Management: The risks and benefits of medication were discussed with the patient.   · Increase to Zoloft 150 mg  daily   · Continue Abilify 15 mg po daily  · Continue Ambien 10 mg po q sh PRN insomnia (Trazodone and  Zaleplon not effective)  · Continue Klonopin 1 mg po daily PRN severe anxiety  · Restart Trazodone 100 mg po qhs  · Set appointment with Dr. Monroe for grief counseling    Return to  Clinic: 1 month     Risks, benefits, side effects and alternative treatments discussed with patient. Patient agrees with the current plan as documented.  Encouraged Patient to keep future appointments.  Take medications as prescribed and abstain from substance abuse.  Pt to present to ED for thoughts to harm herself or others

## 2022-05-02 RX ORDER — KETOROLAC TROMETHAMINE 30 MG/ML
60 INJECTION, SOLUTION INTRAMUSCULAR; INTRAVENOUS
Status: DISCONTINUED | OUTPATIENT
Start: 2022-04-28 | End: 2023-08-16

## 2022-05-09 ENCOUNTER — TELEPHONE (OUTPATIENT)
Dept: NEUROLOGY | Facility: CLINIC | Age: 57
End: 2022-05-09
Payer: MEDICARE

## 2022-05-09 NOTE — TELEPHONE ENCOUNTER
----- Message from Cici Hill sent at 2022  1:22 PM CDT -----  Contact: self @ [phone]  This is a prior pt of Dr Paredes who was last seen 10-12-20.  She says she has seen him for tremor, memory loss and TIA.  She says over the weekend she went to his sister's wake and .  She says she blacked out and one of her sisters gave her some lorazepam and she came to.  She says she does not know how long she was out and does not remember blacking out or anything about the situation.  She says family members called her today to check on her and she was confused as to why they were calling to check on her.  She is calling to request an appt with Dr Paredes or ask if he can refer to someone.  Pls call.

## 2022-05-15 ENCOUNTER — HOSPITAL ENCOUNTER (EMERGENCY)
Facility: HOSPITAL | Age: 57
Discharge: HOME | End: 2022-05-15
Attending: EMERGENCY MEDICINE
Payer: MEDICARE

## 2022-05-15 VITALS
TEMPERATURE: 98 F | HEIGHT: 64 IN | DIASTOLIC BLOOD PRESSURE: 72 MMHG | WEIGHT: 293 LBS | HEART RATE: 65 BPM | OXYGEN SATURATION: 98 % | BODY MASS INDEX: 50.02 KG/M2 | SYSTOLIC BLOOD PRESSURE: 143 MMHG | RESPIRATION RATE: 17 BRPM

## 2022-05-15 DIAGNOSIS — R07.89 CHEST WALL PAIN: Primary | ICD-10-CM

## 2022-05-15 DIAGNOSIS — R07.89 CHEST DISCOMFORT: ICD-10-CM

## 2022-05-15 PROCEDURE — 99284 PR EMERGENCY DEPT VISIT,LEVEL IV: ICD-10-PCS | Mod: ,,, | Performed by: EMERGENCY MEDICINE

## 2022-05-15 PROCEDURE — 99284 EMERGENCY DEPT VISIT MOD MDM: CPT | Mod: ,,, | Performed by: EMERGENCY MEDICINE

## 2022-05-15 PROCEDURE — 63600175 PHARM REV CODE 636 W HCPCS: Performed by: EMERGENCY MEDICINE

## 2022-05-15 PROCEDURE — 99284 EMERGENCY DEPT VISIT MOD MDM: CPT | Mod: 25

## 2022-05-15 PROCEDURE — 93005 ELECTROCARDIOGRAM TRACING: CPT

## 2022-05-15 PROCEDURE — 93010 ELECTROCARDIOGRAM REPORT: CPT | Mod: ,,, | Performed by: INTERNAL MEDICINE

## 2022-05-15 PROCEDURE — 93010 EKG 12-LEAD: ICD-10-PCS | Mod: ,,, | Performed by: INTERNAL MEDICINE

## 2022-05-15 PROCEDURE — 96372 THER/PROPH/DIAG INJ SC/IM: CPT | Performed by: EMERGENCY MEDICINE

## 2022-05-15 RX ORDER — TRIAMCINOLONE ACETONIDE 40 MG/ML
80 INJECTION, SUSPENSION INTRA-ARTICULAR; INTRAMUSCULAR
Status: COMPLETED | OUTPATIENT
Start: 2022-05-15 | End: 2022-05-15

## 2022-05-15 RX ORDER — KETOROLAC TROMETHAMINE 30 MG/ML
30 INJECTION, SOLUTION INTRAMUSCULAR; INTRAVENOUS
Status: COMPLETED | OUTPATIENT
Start: 2022-05-15 | End: 2022-05-15

## 2022-05-15 RX ADMIN — KETOROLAC TROMETHAMINE 30 MG: 30 INJECTION, SOLUTION INTRAMUSCULAR at 11:05

## 2022-05-15 RX ADMIN — TRIAMCINOLONE ACETONIDE 80 MG: 40 INJECTION, SUSPENSION INTRA-ARTICULAR; INTRAMUSCULAR at 11:05

## 2022-05-15 NOTE — ED PROVIDER NOTES
Encounter Date: 5/15/2022       History     Chief Complaint   Patient presents with    Multiple compaints     R upper chest started hurting on Friday, denies injury, pain to r shoulder     56-year-old female presents with right-sided shoulder and chest pain for the past 3 days.  The pain is worse when she moves her arm or pushes on her chest.  It is aching pain.  It does not radiate to the back.  There is no change with breathing.  There is no shortness of breath.  She is not sure of any injury.  She did have a death of her sister recently which she has taken very hard.  She had an episode where she blacked out but did not lose consciousness during the .  She states she just sat down and does not remember the  even though she was able to swallow a lorazepam.  She has been taking all her medications.  She noted her blood pressure was high in triage.  Denies any headache or visual changes.  There is no numbness going down her right arm leg or left side of the body.  There is no neck pain.  History was obtained from the patient who gives a very clear history.  Her daughter was on face time after the interview and also gave history of the recent death in the family.     She has a complex medical history.  She had sudden cardiac death, prolonged QT syndrome, atrial fibrillation on anticoagulation, AICD, remote history of diabetes that is now well controlled, hypertension.  She has GERD and is having an endoscopy later this this month or next        Review of patient's allergies indicates:   Allergen Reactions    Aspirin Hives    Imitrex [sumatriptan] Palpitations    Penicillins Hives and Swelling    Shellfish containing products Anaphylaxis     seafood    Reglan [metoclopramide hcl] Other (See Comments)     Parkinsonism      Past Medical History:   Diagnosis Date    Anticoagulant long-term use     Anxiety     Asthma     Atrial fibrillation     Brain anoxic injury     Cervicalgia 2014     CHI (closed head injury) 2/19/2013    Convulsion 5/30/2015    Decreased ROM of left shoulder 4/12/2017    Defibrillator activation 2013    Depression     Heart block     History of sudden cardiac arrest 2/2013    PEA arrest with subsequent long-QT    Hx of psychiatric care     Hypertension     Left atrial enlargement 4/11/2018    Pacemaker 2013    Paresthesia 11/1/2013    Prolonged Q-T interval on ECG 2/8/2013    Psychiatric problem     Seizures     Sleep difficulties     Stroke     weakness lt side    Therapy     Thyroid disease     Upper airway resistance syndrome 2/21/2017     Past Surgical History:   Procedure Laterality Date    breast reduction      CARDIAC DEFIBRILLATOR PLACEMENT      CARDIAC DEFIBRILLATOR PLACEMENT      CARPAL TUNNEL RELEASE Right     COLONOSCOPY N/A 5/7/2019    Procedure: COLONOSCOPY;  Surgeon: Juan Coffey MD;  Location: [facility] ENDO ([address] [place] [address]);  Service: Endoscopy;  Laterality: N/A;  per DR. Bustamante Pt to have balloon with DR. Coffey due to excesive looping, could not reach cecum - see telephone encounter 3/8/19 and last procedure report dated 6/24/14/ Per Piedad schedule as 90 min case- ER[facility]  pacemaker/AICD Boitronik/   per , ok to hold Xareltox 2 days    HERNIA REPAIR      HIATAL HERNIA REPAIR      INSERT / REPLACE / REMOVE PACEMAKER  10/2017    CRT-D upgrade    REVISION OF IMPLANTABLE CARDIOVERTER-DEFIBRILLATOR (ICD) ELECTRODE LEAD PLACEMENT Left 12/7/2020    Procedure: REVISION, INSERTION, ELECTRODE LEAD, ICD;  Surgeon: Avelino Mejia MD;  Location: [facility] EP LAB;  Service: Cardiology;  Laterality: Left;    REVISION OF SKIN POCKET FOR CARDIOVERTER-DEFIBRILLATOR  12/7/2020    Procedure: Revision, Skin Pocket, For Cardioverter-Defibrillator;  Surgeon: Avelino Mejia MD;  Location: [facility] EP LAB;  Service: Cardiology;;    TOTAL REDUCTION MAMMOPLASTY      TRANSESOPHAGEAL ECHOCARDIOGRAPHY N/A 12/7/2020    Procedure: ECHOCARDIOGRAM, TRANSESOPHAGEAL;  Surgeon:  Ivory Goodman MD;  Location: [facility] EP LAB;  Service: Cardiology;  Laterality: N/A;    TRANSESOPHAGEAL ECHOCARDIOGRAPHY N/A 12/7/2020    Procedure: ECHOCARDIOGRAM, TRANSESOPHAGEAL;  Surgeon: Patrick Diagnostic Provider;  Location: [facility] OR 2ND FLR;  Service: Cardiology;  Laterality: N/A;    TRIGGER FINGER RELEASE Left 8/2/2019    Procedure: RELEASE, TRIGGER FINGER left middle;  Surgeon: Matt Pugh Jr., MD;  Location: [facility] OR;  Service: Plastics;  Laterality: Left;    TRIGGER FINGER RELEASE Right 2/11/2020    Procedure: RELEASE, TRIGGER FINGER middle;  Surgeon: Matt Pugh Jr., MD;  Location: [facility] OR;  Service: Plastics;  Laterality: Right;    TUBAL LIGATION       Family History   Problem Relation Age of Onset    Hypertension Mother     COPD Unknown     Heart failure Unknown     Glaucoma Maternal Grandmother     Glaucoma Paternal Grandmother      Social History     Tobacco Use    Smoking status: Never Smoker    Smokeless tobacco: Never Used   Substance Use Topics    Alcohol use: No    Drug use: No     Review of Systems   Constitutional: Negative for chills and fever.   HENT: Negative for congestion.    Respiratory: Negative for cough and shortness of breath.    Cardiovascular: Positive for chest pain.   Gastrointestinal: Negative for abdominal pain.   Genitourinary: Negative for flank pain.   Musculoskeletal: Negative for back pain and neck pain.   Skin: Negative for rash.   Neurological: Negative for light-headedness, numbness and headaches.   Hematological: Negative for adenopathy.   Psychiatric/Behavioral: Negative for agitation.       Physical Exam     Initial Vitals [05/15/22 1030]   BP Pulse Resp Temp SpO2   (!) 174/105 74 18 97.9 °F (36.6 °C) 99 %      MAP       --         Physical Exam    Constitutional: She appears well-developed and well-nourished. She is not diaphoretic. No distress.   HENT:   Head: Normocephalic.   Eyes: Conjunctivae and EOM are normal. Pupils are equal,  round, and reactive to light.   Neck: Neck supple. No JVD present.   Normal range of motion.  Cardiovascular: Normal rate, regular rhythm and normal heart sounds.   Pulses:       Radial pulses are 2+ on the right side and 2+ on the left side.   Pulmonary/Chest: Breath sounds normal. No respiratory distress. She has no wheezes. She has no rhonchi. She has no rales. She exhibits tenderness.   Palpation of the right chest near the pectoralis insertion of the shoulder clearly reproduces her pain.  Range of motion of the shoulder also reproduces the chest pain.     Abdominal: Abdomen is soft. Bowel sounds are normal. She exhibits no distension. There is no abdominal tenderness.   Musculoskeletal:      Cervical back: Normal range of motion and neck supple.      Comments: Pain to chest on range of motion of the right shoulder.  Able to do full range of motion of shoulder.  There is no effusion or erythema to the joint.  No tenderness to the elbow or wrist.     Neurological: She is alert. She has normal strength. No cranial nerve deficit or sensory deficit.   Normal strength to the right hand normal radial ulnar median nerve motor and sensation.  Normal axillary sensation.   Psychiatric: She has a normal mood and affect.         ED Course   Procedures  Labs Reviewed - No data to display  EKG Readings: (Independently Interpreted)   Ventricularly paced rhythm at 74       Imaging Results    None          Medications   ketorolac injection 30 mg (has no administration in time range)   triamcinolone acetonide injection 80 mg (has no administration in time range)     Medical Decision Making:   Initial Assessment:   56-year-old female with complex cardiac history presents with musculoskeletal right-sided chest pain.  There is no symptoms to suggest this is acute coronary syndrome, congestive heart failure, pulmonary embolus.  Highly doubt aortic dissection.  Does not sound infectious.  Sounds musculoskeletal.  Will do a chest  x-ray, shoulder x-ray.  Pain control might be challenging.  I do not favor doing long-term NSAIDs even though she gets periodic Toradol injections with transient improvement.  She is on anticoagulation.  Would benefit from steroid injection and Toradol injection.  Will do short course of Mobic for 5 days.    Patient with elevated blood pressure.  She is not having symptoms of hypertension.  Will reassess after tests and treatment.  ED Management:  X-ray show no fracture.  No sign of infiltrate or enlarged mediastinum.  Blood pressure trended down nicely.  Will discharge home in stable condition.                      Clinical Impression:   Final diagnoses:  [R07.89] Chest discomfort                 Mark Noland MD  05/15/22 4891

## 2022-05-15 NOTE — ED NOTES
Patient identifiers for Amna Chawla 56 y.o. female checked and correct.  Chief Complaint   Patient presents with    Multiple compaints     R upper chest started hurting on Friday, denies injury, pain to r shoulder     Past Medical History:   Diagnosis Date    Anticoagulant long-term use     Anxiety     Asthma     Atrial fibrillation     Brain anoxic injury     Cervicalgia 8/28/2014    CHI (closed head injury) 2/19/2013    Convulsion 5/30/2015    Decreased ROM of left shoulder 4/12/2017    Defibrillator activation 2013    Depression     Heart block     History of sudden cardiac arrest 2/2013    PEA arrest with subsequent long-QT    Hx of psychiatric care     Hypertension     Left atrial enlargement 4/11/2018    Pacemaker 2013    Paresthesia 11/1/2013    Prolonged Q-T interval on ECG 2/8/2013    Psychiatric problem     Seizures     Sleep difficulties     Stroke     weakness lt side    Therapy     Thyroid disease     Upper airway resistance syndrome 2/21/2017     Allergies reported:   Review of patient's allergies indicates:   Allergen Reactions    Aspirin Hives    Imitrex [sumatriptan] Palpitations    Penicillins Hives and Swelling    Shellfish containing products Anaphylaxis     seafood    Reglan [metoclopramide hcl] Other (See Comments)     Parkinsonism          LOC: Patient is awake, alert, and aware of environment with an appropriate affect. Patient is oriented x 4 and speaking appropriately.  APPEARANCE: Patient resting comfortably and in no acute distress. Patient is clean and well groomed, patient's clothing is properly fastened.  HEENT: Pt presents with surgical mask on.   SKIN: The skin is warm and dry. Patient has normal skin turgor and moist mucus membranes.   MUSKULOSKELETAL: Patient is moving all extremities well, no obvious deformities noted. Pulses intact.   RESPIRATORY: Airway is open and patent. Respirations are spontaneous and non-labored with normal effort and  rate.  CARDIAC: Patient has a normal rate and rhythm. 74 on cardiac monitor. No peripheral edema noted.   ABDOMEN: No distention noted. Soft and non-tender upon palpation.  NEUROLOGICAL: pupils 3mm, PERRL. Facial expression is symmetrical. Hand grasps are equal bilaterally. Normal sensation in all extremities when touched with finger.

## 2022-05-17 ENCOUNTER — TELEPHONE (OUTPATIENT)
Dept: CARDIOLOGY | Facility: HOSPITAL | Age: 57
End: 2022-05-17
Payer: MEDICARE

## 2022-05-17 ENCOUNTER — HOSPITAL ENCOUNTER (EMERGENCY)
Facility: HOSPITAL | Age: 57
Discharge: HOME | End: 2022-05-17
Attending: EMERGENCY MEDICINE
Payer: MEDICARE

## 2022-05-17 VITALS
RESPIRATION RATE: 20 BRPM | WEIGHT: 293 LBS | BODY MASS INDEX: 53.55 KG/M2 | OXYGEN SATURATION: 98 % | HEART RATE: 84 BPM | DIASTOLIC BLOOD PRESSURE: 89 MMHG | SYSTOLIC BLOOD PRESSURE: 185 MMHG | TEMPERATURE: 98 F

## 2022-05-17 DIAGNOSIS — R51.9 NONINTRACTABLE HEADACHE, UNSPECIFIED CHRONICITY PATTERN, UNSPECIFIED HEADACHE TYPE: ICD-10-CM

## 2022-05-17 DIAGNOSIS — R29.818 TRANSIENT NEUROLOGIC DEFICIT: Primary | ICD-10-CM

## 2022-05-17 DIAGNOSIS — I10 HYPERTENSION, UNSPECIFIED TYPE: ICD-10-CM

## 2022-05-17 LAB
ALBUMIN SERPL BCP-MCNC: 3.3 G/DL (ref 3.5–5.2)
ALP SERPL-CCNC: 66 U/L (ref 55–135)
ALT SERPL W/O P-5'-P-CCNC: 21 U/L (ref 10–44)
ANION GAP SERPL CALC-SCNC: 5 MMOL/L (ref 8–16)
ANION GAP SERPL CALC-SCNC: 6 MMOL/L (ref 8–16)
AST SERPL-CCNC: 27 U/L (ref 10–40)
BASOPHILS # BLD AUTO: 0.01 K/UL (ref 0–0.2)
BASOPHILS NFR BLD: 0.2 % (ref 0–1.9)
BILIRUB SERPL-MCNC: 0.3 MG/DL (ref 0.1–1)
BUN SERPL-MCNC: 6 MG/DL (ref 6–30)
BUN SERPL-MCNC: 8 MG/DL (ref 6–20)
BUN SERPL-MCNC: 8 MG/DL (ref 6–20)
CALCIUM SERPL-MCNC: 9 MG/DL (ref 8.7–10.5)
CALCIUM SERPL-MCNC: 9.1 MG/DL (ref 8.7–10.5)
CHLORIDE SERPL-SCNC: 105 MMOL/L (ref 95–110)
CHLORIDE SERPL-SCNC: 106 MMOL/L (ref 95–110)
CHLORIDE SERPL-SCNC: 115 MMOL/L (ref 95–110)
CHOLEST SERPL-MCNC: 126 MG/DL (ref 120–199)
CHOLEST/HDLC SERPL: 3.3 {RATIO} (ref 2–5)
CO2 SERPL-SCNC: 21 MMOL/L (ref 23–29)
CO2 SERPL-SCNC: 24 MMOL/L (ref 23–29)
CREAT SERPL-MCNC: 0.3 MG/DL (ref 0.5–1.4)
CREAT SERPL-MCNC: 0.8 MG/DL (ref 0.5–1.4)
CREAT SERPL-MCNC: 0.8 MG/DL (ref 0.5–1.4)
CREAT SERPL-MCNC: 0.9 MG/DL (ref 0.5–1.4)
DIFFERENTIAL METHOD: ABNORMAL
EOSINOPHIL # BLD AUTO: 0 K/UL (ref 0–0.5)
EOSINOPHIL NFR BLD: 0 % (ref 0–8)
ERYTHROCYTE [DISTWIDTH] IN BLOOD BY AUTOMATED COUNT: 15.9 % (ref 11.5–14.5)
EST. GFR  (AFRICAN AMERICAN): >60 ML/MIN/1.73 M^2
EST. GFR  (AFRICAN AMERICAN): >60 ML/MIN/1.73 M^2
EST. GFR  (NON AFRICAN AMERICAN): >60 ML/MIN/1.73 M^2
EST. GFR  (NON AFRICAN AMERICAN): >60 ML/MIN/1.73 M^2
GLUCOSE SERPL-MCNC: 64 MG/DL (ref 70–110)
GLUCOSE SERPL-MCNC: 90 MG/DL (ref 70–110)
GLUCOSE SERPL-MCNC: 92 MG/DL (ref 70–110)
HCT VFR BLD AUTO: 35.7 % (ref 37–48.5)
HCT VFR BLD CALC: 27 %PCV (ref 36–54)
HDLC SERPL-MCNC: 38 MG/DL (ref 40–75)
HDLC SERPL: 30.2 % (ref 20–50)
HGB BLD-MCNC: 11.7 G/DL (ref 12–16)
IMM GRANULOCYTES # BLD AUTO: 0.01 K/UL (ref 0–0.04)
IMM GRANULOCYTES NFR BLD AUTO: 0.2 % (ref 0–0.5)
INR PPP: 1.3 (ref 0.8–1.2)
LDLC SERPL CALC-MCNC: 73 MG/DL (ref 63–159)
LYMPHOCYTES # BLD AUTO: 1.4 K/UL (ref 1–4.8)
LYMPHOCYTES NFR BLD: 33.3 % (ref 18–48)
MCH RBC QN AUTO: 29.6 PG (ref 27–31)
MCHC RBC AUTO-ENTMCNC: 32.8 G/DL (ref 32–36)
MCV RBC AUTO: 90 FL (ref 82–98)
MONOCYTES # BLD AUTO: 0.6 K/UL (ref 0.3–1)
MONOCYTES NFR BLD: 13.1 % (ref 4–15)
NEUTROPHILS # BLD AUTO: 2.2 K/UL (ref 1.8–7.7)
NEUTROPHILS NFR BLD: 53.2 % (ref 38–73)
NONHDLC SERPL-MCNC: 88 MG/DL
NRBC BLD-RTO: 0 /100 WBC
PLATELET # BLD AUTO: 250 K/UL (ref 150–450)
PMV BLD AUTO: 11.2 FL (ref 9.2–12.9)
POC IONIZED CALCIUM: 0.99 MMOL/L (ref 1.06–1.42)
POC PTINR: 1.9 (ref 0.9–1.2)
POC PTWBT: 22.2 SEC (ref 9.7–14.3)
POC TCO2 (MEASURED): 22 MMOL/L (ref 23–29)
POCT GLUCOSE: 101 MG/DL (ref 70–110)
POTASSIUM BLD-SCNC: 3.3 MMOL/L (ref 3.5–5.1)
POTASSIUM SERPL-SCNC: 4.7 MMOL/L (ref 3.5–5.1)
POTASSIUM SERPL-SCNC: 6.1 MMOL/L (ref 3.5–5.1)
PROT SERPL-MCNC: 11.4 G/DL (ref 6–8.4)
PROTHROMBIN TIME: 13.4 SEC (ref 9–12.5)
RBC # BLD AUTO: 3.95 M/UL (ref 4–5.4)
SAMPLE: ABNORMAL
SAMPLE: ABNORMAL
SAMPLE: NORMAL
SODIUM BLD-SCNC: 147 MMOL/L (ref 136–145)
SODIUM SERPL-SCNC: 132 MMOL/L (ref 136–145)
SODIUM SERPL-SCNC: 135 MMOL/L (ref 136–145)
TRIGL SERPL-MCNC: 75 MG/DL (ref 30–150)
TROPONIN I SERPL DL<=0.01 NG/ML-MCNC: 0.01 NG/ML (ref 0–0.03)
TSH SERPL DL<=0.005 MIU/L-ACNC: 1.19 UIU/ML (ref 0.4–4)
WBC # BLD AUTO: 4.21 K/UL (ref 3.9–12.7)

## 2022-05-17 PROCEDURE — 99291 CRITICAL CARE FIRST HOUR: CPT | Mod: ,,, | Performed by: EMERGENCY MEDICINE

## 2022-05-17 PROCEDURE — 93005 ELECTROCARDIOGRAM TRACING: CPT

## 2022-05-17 PROCEDURE — 99285 EMERGENCY DEPT VISIT HI MDM: CPT | Mod: 25

## 2022-05-17 PROCEDURE — 96375 TX/PRO/DX INJ NEW DRUG ADDON: CPT

## 2022-05-17 PROCEDURE — 93010 EKG 12-LEAD: ICD-10-PCS | Mod: ,,, | Performed by: INTERNAL MEDICINE

## 2022-05-17 PROCEDURE — 82565 ASSAY OF CREATININE: CPT

## 2022-05-17 PROCEDURE — 96376 TX/PRO/DX INJ SAME DRUG ADON: CPT

## 2022-05-17 PROCEDURE — 99900035 HC TECH TIME PER 15 MIN (STAT)

## 2022-05-17 PROCEDURE — 84443 ASSAY THYROID STIM HORMONE: CPT | Performed by: EMERGENCY MEDICINE

## 2022-05-17 PROCEDURE — 99291 PR CRITICAL CARE, E/M 30-74 MINUTES: ICD-10-PCS | Mod: ,,, | Performed by: EMERGENCY MEDICINE

## 2022-05-17 PROCEDURE — 99284 EMERGENCY DEPT VISIT MOD MDM: CPT | Mod: ,,, | Performed by: PSYCHIATRY & NEUROLOGY

## 2022-05-17 PROCEDURE — 80061 LIPID PANEL: CPT | Performed by: EMERGENCY MEDICINE

## 2022-05-17 PROCEDURE — 99284 PR EMERGENCY DEPT VISIT,LEVEL IV: ICD-10-PCS | Mod: ,,, | Performed by: PSYCHIATRY & NEUROLOGY

## 2022-05-17 PROCEDURE — 85610 PROTHROMBIN TIME: CPT

## 2022-05-17 PROCEDURE — 25000003 PHARM REV CODE 250: Performed by: EMERGENCY MEDICINE

## 2022-05-17 PROCEDURE — 80053 COMPREHEN METABOLIC PANEL: CPT | Performed by: EMERGENCY MEDICINE

## 2022-05-17 PROCEDURE — 80048 BASIC METABOLIC PNL TOTAL CA: CPT | Mod: XB | Performed by: EMERGENCY MEDICINE

## 2022-05-17 PROCEDURE — 85025 COMPLETE CBC W/AUTO DIFF WBC: CPT | Performed by: EMERGENCY MEDICINE

## 2022-05-17 PROCEDURE — 96365 THER/PROPH/DIAG IV INF INIT: CPT

## 2022-05-17 PROCEDURE — 93010 ELECTROCARDIOGRAM REPORT: CPT | Mod: ,,, | Performed by: INTERNAL MEDICINE

## 2022-05-17 PROCEDURE — 63600175 PHARM REV CODE 636 W HCPCS: Performed by: EMERGENCY MEDICINE

## 2022-05-17 PROCEDURE — 85610 PROTHROMBIN TIME: CPT | Performed by: EMERGENCY MEDICINE

## 2022-05-17 PROCEDURE — 84484 ASSAY OF TROPONIN QUANT: CPT | Performed by: EMERGENCY MEDICINE

## 2022-05-17 RX ORDER — ACETAMINOPHEN 500 MG
1000 TABLET ORAL
Status: COMPLETED | OUTPATIENT
Start: 2022-05-17 | End: 2022-05-17

## 2022-05-17 RX ORDER — CARVEDILOL 12.5 MG/1
25 TABLET ORAL
Status: COMPLETED | OUTPATIENT
Start: 2022-05-17 | End: 2022-05-17

## 2022-05-17 RX ORDER — MAGNESIUM SULFATE HEPTAHYDRATE 40 MG/ML
2 INJECTION, SOLUTION INTRAVENOUS
Status: COMPLETED | OUTPATIENT
Start: 2022-05-17 | End: 2022-05-17

## 2022-05-17 RX ORDER — ACETAMINOPHEN 325 MG/1
650 TABLET ORAL EVERY 6 HOURS PRN
Qty: 30 TABLET | Refills: 0 | Status: SHIPPED | OUTPATIENT
Start: 2022-05-17 | End: 2023-02-02

## 2022-05-17 RX ORDER — HYDRALAZINE HYDROCHLORIDE 20 MG/ML
10 INJECTION INTRAMUSCULAR; INTRAVENOUS
Status: COMPLETED | OUTPATIENT
Start: 2022-05-17 | End: 2022-05-17

## 2022-05-17 RX ORDER — BUTALBITAL, ACETAMINOPHEN AND CAFFEINE 50; 325; 40 MG/1; MG/1; MG/1
1 TABLET ORAL EVERY 4 HOURS PRN
Qty: 8 TABLET | Refills: 0 | Status: SHIPPED | OUTPATIENT
Start: 2022-05-17 | End: 2022-06-16

## 2022-05-17 RX ADMIN — HYDRALAZINE HYDROCHLORIDE 10 MG: 20 INJECTION, SOLUTION INTRAMUSCULAR; INTRAVENOUS at 06:05

## 2022-05-17 RX ADMIN — MAGNESIUM SULFATE 2 G: 2 INJECTION INTRAVENOUS at 03:05

## 2022-05-17 RX ADMIN — SODIUM CHLORIDE 500 ML: 0.9 INJECTION, SOLUTION INTRAVENOUS at 02:05

## 2022-05-17 RX ADMIN — CARVEDILOL 25 MG: 12.5 TABLET, FILM COATED ORAL at 07:05

## 2022-05-17 RX ADMIN — ACETAMINOPHEN 1000 MG: 500 TABLET ORAL at 02:05

## 2022-05-17 RX ADMIN — HYDRALAZINE HYDROCHLORIDE 10 MG: 20 INJECTION, SOLUTION INTRAMUSCULAR; INTRAVENOUS at 07:05

## 2022-05-17 NOTE — ED PROVIDER NOTES
"Encounter Date: 5/17/2022       History     Chief Complaint   Patient presents with    Headache     Severe HA for 3 hours, pt reports it feels like previous TIA. Has a pacemaker that's firing. HX of migraines. Pt states, "it feels significantly different" from previous migraines.      HPI   56-year-old female with past medical history as noted below coming in with feeling unwell since this morning.  She woke up this morning around 5:00 a.m., took blood pressure medications, at around 9:00 a.m. she reports some generalized weakness dizziness and lightheadedness.  Around 11:30 a.m. she started having headache, slowly worsening over an hour and a half, her symptoms continue to she called EMS to come to the emergency department.  She did not take anything for the headache.  She states this headache feels different than her previous/baseline headaches (no photophobia, not sensitive to sound).  She also noticed when she came to the emergency department that her right leg was unusually week.  She notes that her previous major stroke he presents for left-sided weakness.    She denies being shocked by her AICD pacemaker.  The EMS personnel told her that her have pacemaker was pacing which is what is interpreted in the triage complaint note.    Review of patient's allergies indicates:   Allergen Reactions    Aspirin Hives    Imitrex [sumatriptan] Palpitations    Penicillins Hives and Swelling    Shellfish containing products Anaphylaxis     seafood    Reglan [metoclopramide hcl] Other (See Comments)     Parkinsonism      Past Medical History:   Diagnosis Date    Anticoagulant long-term use     Anxiety     Asthma     Atrial fibrillation     Brain anoxic injury     Cervicalgia 8/28/2014    CHI (closed head injury) 2/19/2013    Convulsion 5/30/2015    Decreased ROM of left shoulder 4/12/2017    Defibrillator activation 2013    Depression     Heart block     History of sudden cardiac arrest 2/2013    PEA arrest " with subsequent long-QT    Hx of psychiatric care     Hypertension     Left atrial enlargement 4/11/2018    Pacemaker 2013    Paresthesia 11/1/2013    Prolonged Q-T interval on ECG 2/8/2013    Psychiatric problem     Seizures     Sleep difficulties     Stroke     weakness lt side    Therapy     Thyroid disease     Upper airway resistance syndrome 2/21/2017     Past Surgical History:   Procedure Laterality Date    breast reduction      CARDIAC DEFIBRILLATOR PLACEMENT      CARDIAC DEFIBRILLATOR PLACEMENT      CARPAL TUNNEL RELEASE Right     COLONOSCOPY N/A 5/7/2019    Procedure: COLONOSCOPY;  Surgeon: Juan Coffey MD;  Location: [facility] ENDO ([address] [place] [address]);  Service: Endoscopy;  Laterality: N/A;  per DR. Bustamante Pt to have balloon with DR. Coffey due to excesive looping, could not reach cecum - see telephone encounter 3/8/19 and last procedure report dated 6/24/14/ Per Piedad schedule as 90 min case- ERW  pacemaker/AICD Boitronik/   per , ok to hold Xareltox 2 days    HERNIA REPAIR      HIATAL HERNIA REPAIR      INSERT / REPLACE / REMOVE PACEMAKER  10/2017    CRT-D upgrade    REVISION OF IMPLANTABLE CARDIOVERTER-DEFIBRILLATOR (ICD) ELECTRODE LEAD PLACEMENT Left 12/7/2020    Procedure: REVISION, INSERTION, ELECTRODE LEAD, ICD;  Surgeon: Avelino Mejia MD;  Location: [facility] EP LAB;  Service: Cardiology;  Laterality: Left;    REVISION OF SKIN POCKET FOR CARDIOVERTER-DEFIBRILLATOR  12/7/2020    Procedure: Revision, Skin Pocket, For Cardioverter-Defibrillator;  Surgeon: Avelino Mejia MD;  Location: [facility] EP LAB;  Service: Cardiology;;    TOTAL REDUCTION MAMMOPLASTY      TRANSESOPHAGEAL ECHOCARDIOGRAPHY N/A 12/7/2020    Procedure: ECHOCARDIOGRAM, TRANSESOPHAGEAL;  Surgeon: Ivory Goodman MD;  Location: [facility] EP LAB;  Service: Cardiology;  Laterality: N/A;    TRANSESOPHAGEAL ECHOCARDIOGRAPHY N/A 12/7/2020    Procedure: ECHOCARDIOGRAM, TRANSESOPHAGEAL;  Surgeon: [facility] Diagnostic Provider;  Location:  [facility] OR 2ND FLR;  Service: Cardiology;  Laterality: N/A;    TRIGGER FINGER RELEASE Left 8/2/2019    Procedure: RELEASE, TRIGGER FINGER left middle;  Surgeon: Matt Pugh Jr., MD;  Location: [facility] OR;  Service: Plastics;  Laterality: Left;    TRIGGER FINGER RELEASE Right 2/11/2020    Procedure: RELEASE, TRIGGER FINGER middle;  Surgeon: Matt Pugh Jr., MD;  Location: [facility] OR;  Service: Plastics;  Laterality: Right;    TUBAL LIGATION       Family History   Problem Relation Age of Onset    Hypertension Mother     COPD Unknown     Heart failure Unknown     Glaucoma Maternal Grandmother     Glaucoma Paternal Grandmother      Social History     Tobacco Use    Smoking status: Never Smoker    Smokeless tobacco: Never Used   Substance Use Topics    Alcohol use: No    Drug use: No     Review of Systems  Constitutional:  No Fever, No Chills,   Eyes: No Vision Changes  ENT/Mouth: No sore throat, No rhinorrhea  Cardiovascular:  No Chest Pain, No Palpitations  Respiratory:  No Cough, No SOB  Gastrointestinal:  No Nausea, No Vomiting, No Diarrhea, No abdo pain.  Genitourinary:  No  pain, No dysuria   Musculoskeletal:  No Arthralgias, No Back Pain, No Neck Pain, No recent trauma.  Skin:  No skin Lesions  Neuro:  Positive generalized Weakness, also right lower extremity weakness, No Numbness, positive Dizziness, positive Headache          Physical Exam     Initial Vitals   BP Pulse Resp Temp SpO2   05/17/22 1235 05/17/22 1235 05/17/22 1235 05/17/22 1237 05/17/22 1235   (!) 164/92 62 20 98 °F (36.7 °C) 98 %      MAP       --                Physical Exam  Physical Exam:  CONSTITUTIONAL: Well developed, well nourished, in no acute distress.  HENT: Normocephalic, atraumatic    EYES: Sclerae anicteric, pupils equal round reactive, extraocular movements are intact.  NECK: Supple, no thyroid enlargement  CARDIOVASCULAR: Regular rate and rhythm without any murmurs, gallops, rubs.  RESPIRATORY: Speaking in full  sentences. Breathing comfortably. Auscultation of the lungs revealed normal breath sounds b/l, no wheezing, no rales, no rhonchi.  ABDOMEN: Soft and nontender, no masses, no rebound or guarding   NEUROLOGIC: Alert, interacting normally. No facial droop, there is decreased sensation to the right face, there is very slight right-sided pronator drift, there is slight weakness to the right lower extremity with decreased sensation to light touch the right upper and right lower extremity.    MSK: Moving all four extremities.  Skin: Warm and dry. No visible rash on exposed areas of skin.    Psych: Mood and affect normal.     ED Course   Procedures  Labs Reviewed   CBC W/ AUTO DIFFERENTIAL - Abnormal; Notable for the following components:       Result Value    RBC 3.95 (*)     Hemoglobin 11.7 (*)     Hematocrit 35.7 (*)     RDW 15.9 (*)     All other components within normal limits   COMPREHENSIVE METABOLIC PANEL - Abnormal; Notable for the following components:    Sodium 135 (*)     Potassium 6.1 (*)     Total Protein 11.4 (*)     Albumin 3.3 (*)     Anion Gap 5 (*)     All other components within normal limits   PROTIME-INR - Abnormal; Notable for the following components:    Prothrombin Time 13.4 (*)     INR 1.3 (*)     All other components within normal limits   LIPID PANEL - Abnormal; Notable for the following components:    HDL 38 (*)     All other components within normal limits   BASIC METABOLIC PANEL - Abnormal; Notable for the following components:    Sodium 132 (*)     CO2 21 (*)     Anion Gap 6 (*)     All other components within normal limits   ISTAT PROCEDURE - Abnormal; Notable for the following components:    POC PTWBT 22.2 (*)     POC PTINR 1.9 (*)     All other components within normal limits   ISTAT PROCEDURE - Abnormal; Notable for the following components:    POC Glucose 64 (*)     POC Creatinine 0.3 (*)     POC Sodium 147 (*)     POC Potassium 3.3 (*)     POC Chloride 115 (*)     POC TCO2 (MEASURED) 22  (*)     POC Ionized Calcium 0.99 (*)     POC Hematocrit 27 (*)     All other components within normal limits   TSH   TROPONIN I   ISTAT CREATININE   POCT GLUCOSE        ECG Results          EKG 12-lead (Final result)  Result time 05/17/22 17:03:26    Final result by Interface, Lab In [facility] (05/17/22 17:03:26)                 Narrative:    Test Reason : I48.91,    Vent. Rate : 075 BPM     Atrial Rate : 075 BPM     P-R Int : 148 ms          QRS Dur : 142 ms      QT Int : 484 ms       P-R-T Axes : 060 083 -24 degrees     QTc Int : 540 ms    Electronic ventricular pacemaker  Abnormal ECG  When compared with ECG of 15-MAY-2022 10:24,  No significant change was found  Confirmed by SARAH MERLOS MD ([id number]) on 5/17/2022 5:03:16 PM    Referred By: FARNAZ MARMOLEJO           Confirmed By:SARAH MERLOS MD                            Imaging Results          CT Head Without Contrast (Final result)  Result time 05/17/22 14:37:20    Final result by Avelino Barr MD (05/17/22 14:37:20)                 Impression:      No acute intracranial abnormalities.  No significant detrimental changes from prior.      Electronically signed by: Avelino Barr MD  Date:    05/17/2022  Time:    14:37             Narrative:    EXAMINATION:  CT HEAD WITHOUT CONTRAST    CLINICAL HISTORY:  Headache, sudden, severe;Strong HA, 3 hrs.;    TECHNIQUE:  Low dose axial images were obtained through the head.  Coronal and sagittal reformations were also performed. Contrast was not administered.    COMPARISON:  09/14/2020    FINDINGS:  No midline shift, hydrocephalus or mass effect.  No acute intracranial hemorrhage or acute major vascular territory infarct.  Small right remote cerebellar infarct.  No abnormal extra-axial fluid collections.  There is an empty sella.  Calvarium intact without evidence of fracture.  Paranasal sinuses and mastoid air cells are essentially clear.                                 Medications   acetaminophen tablet 1,000 mg (1,000 mg Oral  Given 5/17/22 1455)   sodium chloride 0.9% bolus 500 mL (0 mLs Intravenous Stopped 5/17/22 1559)   magnesium sulfate 2g in water 50mL IVPB (premix) (0 g Intravenous Stopped 5/17/22 1608)   hydrALAZINE injection 10 mg (10 mg Intravenous Given 5/17/22 1821)   hydrALAZINE injection 10 mg (10 mg Intravenous Given 5/17/22 1906)   carvediloL tablet 25 mg (25 mg Oral Given 5/17/22 1901)     Medical Decision Making:   History:   Old Medical Records: I decided to obtain old medical records.  Old Records Summarized: records from clinic visits.  Independently Interpreted Test(s):   I have ordered and independently interpreted EKG Reading(s) - see prior notes  Clinical Tests:   Lab Tests: Ordered and Reviewed  Radiological Study: Reviewed and Ordered  Medical Tests: Ordered and Reviewed  Other:   I have discussed this case with another health care provider.      56-year-old female with past medical history as noted coming in with some generalized dizziness, malaise, weakness since this morning, headache since about 11:30 a.m. which was progressive onset different than her usual migraine as well as noticing some right-sided leg weakness when transferring from the EMS stretcher to the hospital stretcher.  On exam lungs, abdominal, heart exams are unremarkable, she has some minor right upper and right lower extremity weakness with some subjective numbness to the right upper right lower extremities and right face.  Given the fact that the right leg weakness seems to be recent onset, stroke code activated.    CT of the brain appears unremarkable to my examination.  She is being evaluated by vascular Neurology team.  Will order set of labs to look for any metabolic hematologic abnormalities explain her symptoms.  Troponin EKG to risk stratify for ACS.    She has not been shocked by her AICD, she is simply just being paced, which was noted to her by EMS.    Disposition based on ED workup and patient's symptomatology.    Critical Care  Time:     The high probability of sudden, clinically significant deterioration in the patient's condition required the highest level of my preparedness to intervene urgently.    Services included the following: chart data review, reviewing nursing notes and/or old charts, documentation time, consultant collaboration regarding findings and treatment options, medication orders and management, direct patient care, vital sign assessments and ordering, interpreting and reviewing diagnostic studies/lab tests.     I spent 45 minutes on total Critical Care time, which includes only time during which I was engaged in work directly related to the patient's care, as described above, whether at the bedside or elsewhere in the Emergency Department.  It did not include time spent on separately billable procedures nor did it include the time spent by residents, students, nurses or physician assistants on this patient's care.    Critical Care was needed secondary to the following conditions: stroke code activation. Acute neuro deficits.                ED Course as of 05/18/22 0944   Tue May 17, 2022   1440 Atrially sensed ventricularly paced rhythm at a rate of 75 with left bundle-branch block appearance, no Sgarbossa criteria.  Similar to prior morphology. [BA]   1527 Labs are largely unremarkable, potassium is elevated but also hemolyzed creatinine is normal.  Will repeat i-STAT.  Seen by vascular Neurology, no tPA, patient cannot get MRI, unable to get CTA at this time.  Will treat headache and see if her symptoms improved.  Avoiding NSAIDs secondary to her anticoagulation, she is allergic to Reglan and avoiding other similar medications to avoid parkinsonism symptoms.  Tylenol, fluids, magnesium for headache. [BA]   1806 Seen by vascular Neurology and they do not believe that she is having a stroke.  They suspect migraine versus hypertensive etiology of her headache.  Will give dose of hydralysine for better blood pressure  control and reassess symptoms after that. [BA]   1854 Vascular Neurology as signed off.  They do not believe she is having a stroke.  Will proceed to lower blood pressure.  Will give hydralazine and her home dose blood pressure medications. [BA]   1854 She does state that her headache is resolved.  Will continue give 1 more dose of hydralazine and reassess patient after that. [BA]      ED Course User Index  [BA] Devyn Luciano MD             Update:  Blood pressures in the 180s over 80s, she states that her headache has resolved and her symptoms also resolved.  Patient reexamined, no longer weak in the right side, normal sensation bilaterally.  Possible complex migraine?  Doubt hypertensive emergency, her labs are baseline.  Recommend she continue her blood pressure medications and monitor her blood pressure closely, ED return precautions for any new recurring neurologic deficits, follow-up with primary care doctor within 1 week for continued management of her blood pressure and other medical conditions.        Findings of ED work up explained to patient. Patient agrees with discharge plan and verbalizes understanding of return precautions.       Clinical Impression:   Final diagnoses:  [R29.818] Transient neurologic deficit (Primary)  [R51.9] Nonintractable headache, unspecified chronicity pattern, unspecified headache type          ED Disposition Condition    Discharge Stable        ED Prescriptions     Medication Sig Dispense Start Date End Date Auth. Provider    acetaminophen (TYLENOL) 325 MG tablet Take 2 tablets (650 mg total) by mouth every 6 (six) hours as needed for Pain. 30 tablet 5/17/2022  Devyn Luciano MD    butalbital-acetaminophen-caffeine -40 mg (FIORICET, ESGIC) -40 mg per tablet Take 1 tablet by mouth every 4 (four) hours as needed for Pain. 8 tablet 5/17/2022 6/16/2022 Devyn Luciano MD        Follow-up Information     Follow up With Specialties Details Why Contact Info    [facility] In 5 days  [address]  [place] [address]  [phone]             Devyn Luciano MD  05/18/22 0966

## 2022-05-17 NOTE — ASSESSMENT & PLAN NOTE
56-year-old female with extensive past medical history presents to ED due to feeling unwell since this morning. Around 9:00 AM she reports onset of generalized weakness, dizziness, and lightheadedness.  Around 11:30 AM she started having headache, slowly worsening over an hour and a half, her symptoms continue to she called EMS to come to the emergency department. She has a history of headaches, but states that this headache is different than her usual/baseline headaches (no photophobia, not sensitive to sound). Denies taking medication for HA symptoms. Upon arrival to ED, she noticed new onset RSW. She notes that her previous major stroke left her with residual left sided symptoms. Stroke team was consulted. CTH ordered by ED team. CTH without intracranial abnormalities and without significant detrimental changes from prior. Patient reports left periorbital HA, RUE/LE numbness, and right UE weakness. Denies aphasia or visual deficits.     On Xarelto, most recent dose taken this morning, therefore, not a candidate for TPA.     CTA deferred due to low suspiscion for LVO and contrast shortage. Unable to undergo MRI/MRA 2/2 pacemaker. On exam, evidence of embellishment therefore low suspicion of cerebrovascular origin of patients symptoms. Please explore other etiologies for patients symptoms, such as  complex migraines vs hypertensive emergency

## 2022-05-17 NOTE — ED NOTES
Patient transported to room 7 from CT, CT completed at this time. Patient placed on cardiac monitor, maury cuff, and continuous pulse ox. Patient complains of right sided weakness as well as headache.

## 2022-05-17 NOTE — SUBJECTIVE & OBJECTIVE
Past Medical History:   Diagnosis Date    Anticoagulant long-term use     Anxiety     Asthma     Atrial fibrillation     Brain anoxic injury     Cervicalgia 8/28/2014    CHI (closed head injury) 2/19/2013    Convulsion 5/30/2015    Decreased ROM of left shoulder 4/12/2017    Defibrillator activation 2013    Depression     Heart block     History of sudden cardiac arrest 2/2013    PEA arrest with subsequent long-QT    Hx of psychiatric care     Hypertension     Left atrial enlargement 4/11/2018    Pacemaker 2013    Paresthesia 11/1/2013    Prolonged Q-T interval on ECG 2/8/2013    Psychiatric problem     Seizures     Sleep difficulties     Stroke     weakness lt side    Therapy     Thyroid disease     Upper airway resistance syndrome 2/21/2017     Past Surgical History:   Procedure Laterality Date    breast reduction      CARDIAC DEFIBRILLATOR PLACEMENT      CARDIAC DEFIBRILLATOR PLACEMENT      CARPAL TUNNEL RELEASE Right     COLONOSCOPY N/A 5/7/2019    Procedure: COLONOSCOPY;  Surgeon: Juan Coffey MD;  [facility]  Service: Endoscopy;  Laterality: N/A;  per DR. Bustamante Pt to have balloon with DR. Coffey due to excesive looping, could not reach cecum - see telephone encounter 3/8/19 and last procedure report dated 6/24/14/ Per Piedad schedule as 90 min case- ERW  pacemaker/AICD Boitronik/   per , ok to hold Xareltox 2 days    HERNIA REPAIR      HIATAL HERNIA REPAIR      INSERT / REPLACE / REMOVE PACEMAKER  10/2017    CRT-D upgrade    REVISION OF IMPLANTABLE CARDIOVERTER-DEFIBRILLATOR (ICD) ELECTRODE LEAD PLACEMENT Left 12/7/2020    Procedure: REVISION, INSERTION, ELECTRODE LEAD, ICD;  Surgeon: Avelino Mejia MD;  Location: [facility] EP LAB;  Service: Cardiology;  Laterality: Left;    REVISION OF SKIN POCKET FOR CARDIOVERTER-DEFIBRILLATOR  12/7/2020    Procedure: Revision, Skin Pocket, For Cardioverter-Defibrillator;  Surgeon: Avelino Mejia MD;  Location: [facility] EP LAB;  Service: Cardiology;;     TOTAL REDUCTION MAMMOPLASTY      TRANSESOPHAGEAL ECHOCARDIOGRAPHY N/A 12/7/2020    Procedure: ECHOCARDIOGRAM, TRANSESOPHAGEAL;  Surgeon: Ivory Goodman MD;  Location: [facility] EP LAB;  Service: Cardiology;  Laterality: N/A;    TRANSESOPHAGEAL ECHOCARDIOGRAPHY N/A 12/7/2020    Procedure: ECHOCARDIOGRAM, TRANSESOPHAGEAL;  Surgeon: Patrick Diagnostic Provider;  Location: [facility] OR 2ND FLR;  Service: Cardiology;  Laterality: N/A;    TRIGGER FINGER RELEASE Left 8/2/2019    Procedure: RELEASE, TRIGGER FINGER left middle;  Surgeon: Matt Pugh Jr., MD;  Location: [facility] OR;  Service: Plastics;  Laterality: Left;    TRIGGER FINGER RELEASE Right 2/11/2020    Procedure: RELEASE, TRIGGER FINGER middle;  Surgeon: Matt Pugh Jr., MD;  Location: [facility] OR;  Service: Plastics;  Laterality: Right;    TUBAL LIGATION       Family History   Problem Relation Age of Onset    Hypertension Mother     COPD Unknown     Heart failure Unknown     Glaucoma Maternal Grandmother     Glaucoma Paternal Grandmother      Social History     Tobacco Use    Smoking status: Never Smoker    Smokeless tobacco: Never Used   Substance Use Topics    Alcohol use: No    Drug use: No     Review of patient's allergies indicates:   Allergen Reactions    Aspirin Hives    Imitrex [sumatriptan] Palpitations    Penicillins Hives and Swelling    Shellfish containing products Anaphylaxis     seafood    Reglan [metoclopramide hcl] Other (See Comments)     Parkinsonism        Medications: I have reviewed the current medication administration record.    (Not in a hospital admission)      Review of Systems   Constitutional:  Negative for fever.   HENT:  Negative for drooling, trouble swallowing and voice change.    Eyes:  Negative for redness.   Respiratory:  Negative for cough.    Cardiovascular:  Negative for chest pain and leg swelling.   Gastrointestinal:  Negative for nausea and vomiting.   Genitourinary:  Negative for dysuria.   Musculoskeletal:  Negative  for arthralgias and myalgias.   Skin:  Negative for rash.   Neurological:  Positive for weakness, numbness and headaches. Negative for dizziness, seizures, facial asymmetry and speech difficulty.   Psychiatric/Behavioral:  Negative for agitation.    Objective:     Vital Signs (Most Recent):  Temp: 98 °F (36.7 °C) (05/17/22 1237)  Pulse: 75 (05/17/22 1435)  Resp: (!) 22 (05/17/22 1435)  BP: (!) 182/90 (05/17/22 1435)  SpO2: 99 % (05/17/22 1435)    Vital Signs Range (Last 24H):  Temp:  [98 °F (36.7 °C)]   Pulse:  [62-83]   Resp:  [18-22]   BP: (164-182)/(90-92)   SpO2:  [98 %-99 %]     Physical Exam  Vitals reviewed.   Constitutional:       General: She is not in acute distress.     Appearance: Normal appearance. She is obese.   HENT:      Head: Normocephalic and atraumatic.      Right Ear: External ear normal.      Left Ear: External ear normal.      Nose: Nose normal.   Eyes:      General: No visual field deficit or scleral icterus.     Extraocular Movements: Extraocular movements intact.   Cardiovascular:      Rate and Rhythm: Normal rate.   Pulmonary:      Effort: Pulmonary effort is normal. No respiratory distress.   Abdominal:      General: There is no distension.   Musculoskeletal:      Cervical back: Normal range of motion.      Right lower leg: No edema.      Left lower leg: No edema.   Skin:     General: Skin is warm and dry.   Neurological:      Mental Status: She is alert and oriented to person, place, and time.      GCS: GCS eye subscore is 4. GCS verbal subscore is 5. GCS motor subscore is 6.      Cranial Nerves: No dysarthria or facial asymmetry.      Sensory: Sensory deficit present.      Motor: Weakness and pronator drift present. No abnormal muscle tone.   Psychiatric:         Mood and Affect: Mood normal.         Behavior: Behavior normal.       Neurological Exam:   LOC: alert  Attention Span: Good   Language: No aphasia  Articulation: No dysarthria  Orientation: Person, Place, Time   Visual Fields:  Full  EOM (CN III, IV, VI): Full/intact  Facial Sensation (CN V): Normal  Facial Movement (CN VII): Symmetric facial expression    Motor: Arm left  Normal 5/5  Leg left  Normal 5/5  Arm right  Paresis: 4/5  Leg right Normal 5/5  Cerebellum: No evidence of appendicular or axial ataxia  Sensation: Intact to light touch, temperature and vibration  Tone: Normal tone throughout      Laboratory:  CMP:   Recent Labs   Lab 05/17/22  1433   CALCIUM 9.1   ALBUMIN 3.3*   PROT 11.4*   *   K 6.1*   CO2 24      BUN 8   CREATININE 0.9   ALKPHOS 66   ALT 21   AST 27   BILITOT 0.3     CBC:   Recent Labs   Lab 05/17/22  1433   WBC 4.21   RBC 3.95*   HGB 11.7*   HCT 35.7*      MCV 90   MCH 29.6   MCHC 32.8     Lipid Panel:   Recent Labs   Lab 05/17/22  1433   CHOL 126   LDLCALC 73.0   HDL 38*   TRIG 75     Coagulation:   Recent Labs   Lab 05/17/22  1433   INR 1.3*     Hgb A1C: No results for input(s): HGBA1C in the last 168 hours.  TSH: No results for input(s): TSH in the last 168 hours.    Diagnostic Results:      Brain imaging:    CT 5/17/22  No acute intracranial abnormalities.  No significant detrimental changes from prior.

## 2022-05-17 NOTE — ED NOTES
I-STAT Chem-8+ Results:   Value Reference Range   Sodium 147 136-145 mmol/L   Potassium  3.3 3.5-5.1 mmol/L   Chloride 115  mmol/L   Ionized Calcium 0.99 1.06-1.42 mmol/L   CO2 (measured) 22 23-29 mmol/L   Glucose 64  mg/dL   BUN 6 6-30 mg/dL   Creatinine 0.3 0.5-1.4 mg/dL   Hematocrit 27 36-54%

## 2022-05-17 NOTE — CONSULTS
[facility] - Emergency Dept  Vascular Neurology  Comprehensive Stroke Center  Consult Note    Inpatient consult to Vascular (Stroke) Neurology  Consult performed by: Ella Dunn PA-C  Consult ordered by: Devyn Luciano MD        Assessment/Plan:     Patient is a 56 y.o. year old female with:    Headache  56-year-old female with extensive past medical history presents to ED due to feeling unwell since this morning. Around 9:00 AM she reports onset of generalized weakness, dizziness, and lightheadedness.  Around 11:30 AM she started having headache, slowly worsening over an hour and a half, her symptoms continue to she called EMS to come to the emergency department. She has a history of headaches, but states that this headache is different than her usual/baseline headaches (no photophobia, not sensitive to sound). Denies taking medication for HA symptoms. Upon arrival to ED, she noticed new onset RSW. She notes that her previous major stroke left her with residual left sided symptoms. Stroke team was consulted. CTH ordered by ED team. CTH without intracranial abnormalities and without significant detrimental changes from prior. Patient reports left periorbital HA, RUE/LE numbness, and right UE weakness. Denies aphasia or visual deficits.     On Xarelto, most recent dose taken this morning, therefore, not a candidate for TPA.     CTA deferred due to low suspiscion for LVO and contrast shortage. Unable to undergo MRI/MRA 2/2 pacemaker. On exam, evidence of embellishment therefore low suspicion of cerebrovascular origin of patients symptoms. Please explore other etiologies for patients symptoms, such as  complex migraines vs hypertensive emergency              STROKE DOCUMENTATION          NIH Scale:  1a. Level of Consciousness: 0-->Alert, keenly responsive  1b. LOC Questions: 0-->Answers both questions correctly  1c. LOC Commands: 0-->Performs both tasks correctly  2. Best Gaze: 0-->Normal  3. Visual: 0-->No  visual loss  4. Facial Palsy: 0-->Normal symmetrical movements  5a. Motor Arm, Left: 0-->No drift, limb holds 90 (or 45) degrees for full 10 secs  5b. Motor Arm, Right: 1-->Drift, limb holds 90 (or 45) degrees, but drifts down before full 10 secs, does not hit bed or other support  6a. Motor Leg, Left: 0-->No drift, leg holds 30 degree position for full 5 secs  6b. Motor Leg, Right: 0-->No drift, leg holds 30 degree position for full 5 secs  7. Limb Ataxia: 0-->Absent  8. Sensory: 1-->Mild-to-moderate sensory loss, patient feels pinprick is less sharp or is dull on the affected side, or there is a loss of superficial pain with pinprick, but patient is aware of being touched  9. Best Language: 0-->No aphasia, normal  10. Dysarthria: 0-->Normal  11. Extinction and Inattention (formerly Neglect): 0-->No abnormality  Total (NIH Stroke Scale): 2    Modified Benton    Brian Coma Scale:    ABCD2 Score:    [id number] Score:   HAS -BLED Score:   ICH Score:   [employer] Classification:       Thrombolysis Candidate? No, Current use of direct thrombin inhibitors (dabigatran) or direct factor Xa inhibitors (rivaroxaban, apixaban, edoxaban) with elevated sensitive laboratory tests , Non-disabling symptoms - Low NIHSS     Delays to Thrombolysis?  Not Applicable    Interventional Revascularization Candidate?   Is the patient eligible for mechanical endovascular reperfusion (BRYAN)?  No; no significant neurologic deficit (NIHSS <6)  and No; at this time symptoms not suggestive of large vessel occlusion    Delays to Thrombectomy? Not Applicable    Hemorrhagic change of an Ischemic Stroke: Does this patient have an ischemic stroke with hemorrhagic changes? No     Subjective:     History of Present Illness:  56-year-old female with PMHx of AFIB, heart block, pacemaker, sudden cardiac arrest, prior stroke, HTN, LAE, seizures, migraines, and others, presents to ED due to feeling unwell since this morning. Around 9:00 AM she reports  onset of generalized weakness, dizziness, and lightheadedness.  Around 11:30 AM she started having headache, slowly worsening over an hour and a half, her symptoms continue to she called EMS to come to the emergency department. She has a history of headaches, but states that this headache is different than her usual/baseline headaches (no photophobia, not sensitive to sound). Denies taking medication for HA symptoms. Upon arrival to ED, she noticed new onset right sided weakness. She notes that her previous major stroke left her with residual left sided symptoms. Stroke team was consulted. CTH ordered by ED team. CTH without intracranial abnormalities and without significant detrimental changes from prior. Patient reports left periorbital HA, RUE/LE numbness, and right UE weakness. Denies aphasia or visual deficits.           Past Medical History:   Diagnosis Date    Anticoagulant long-term use     Anxiety     Asthma     Atrial fibrillation     Brain anoxic injury     Cervicalgia 8/28/2014    CHI (closed head injury) 2/19/2013    Convulsion 5/30/2015    Decreased ROM of left shoulder 4/12/2017    Defibrillator activation 2013    Depression     Heart block     History of sudden cardiac arrest 2/2013    PEA arrest with subsequent long-QT    Hx of psychiatric care     Hypertension     Left atrial enlargement 4/11/2018    Pacemaker 2013    Paresthesia 11/1/2013    Prolonged Q-T interval on ECG 2/8/2013    Psychiatric problem     Seizures     Sleep difficulties     Stroke     weakness lt side    Therapy     Thyroid disease     Upper airway resistance syndrome 2/21/2017     Past Surgical History:   Procedure Laterality Date    breast reduction      CARDIAC DEFIBRILLATOR PLACEMENT      CARDIAC DEFIBRILLATOR PLACEMENT      CARPAL TUNNEL RELEASE Right     COLONOSCOPY N/A 5/7/2019    Procedure: COLONOSCOPY;  Surgeon: Juan Coffey MD;  Location: [facility] ([address] [place] [address]);  Service: Endoscopy;   Laterality: N/A;  per DR. Bustamante Pt to have balloon with DR. Coffey due to excesive looping, could not reach cecum - see telephone encounter 3/8/19 and last procedure report dated 6/24/14/ Per Piedad schedule as 90 min case- ERW  pacemaker/AICD Boitronik/   per , ok to hold Xareltox 2 days    HERNIA REPAIR      HIATAL HERNIA REPAIR      INSERT / REPLACE / REMOVE PACEMAKER  10/2017    CRT-D upgrade    REVISION OF IMPLANTABLE CARDIOVERTER-DEFIBRILLATOR (ICD) ELECTRODE LEAD PLACEMENT Left 12/7/2020    Procedure: REVISION, INSERTION, ELECTRODE LEAD, ICD;  Surgeon: Avelino Mejia MD;  Location: [facility] EP LAB;  Service: Cardiology;  Laterality: Left;    REVISION OF SKIN POCKET FOR CARDIOVERTER-DEFIBRILLATOR  12/7/2020    Procedure: Revision, Skin Pocket, For Cardioverter-Defibrillator;  Surgeon: Avelino Mejia MD;  Location: [facility] EP LAB;  Service: Cardiology;;    TOTAL REDUCTION MAMMOPLASTY      TRANSESOPHAGEAL ECHOCARDIOGRAPHY N/A 12/7/2020    Procedure: ECHOCARDIOGRAM, TRANSESOPHAGEAL;  Surgeon: Ivory Goodman MD;  Location: [facility] EP LAB;  Service: Cardiology;  Laterality: N/A;    TRANSESOPHAGEAL ECHOCARDIOGRAPHY N/A 12/7/2020    Procedure: ECHOCARDIOGRAM, TRANSESOPHAGEAL;  Surgeon: [facility] Diagnostic Provider;  [address]  Service: Cardiology;  Laterality: N/A;    TRIGGER FINGER RELEASE Left 8/2/2019    Procedure: RELEASE, TRIGGER FINGER left middle;  Surgeon: Matt Pugh Jr., MD;  Location: [facility];  Service: Plastics;  Laterality: Left;    TRIGGER FINGER RELEASE Right 2/11/2020    Procedure: RELEASE, TRIGGER FINGER middle;  Surgeon: Matt Pugh Jr., MD;  Location: [facility];  Service: Plastics;  Laterality: Right;    TUBAL LIGATION       Family History   Problem Relation Age of Onset    Hypertension Mother     COPD Unknown     Heart failure Unknown     Glaucoma Maternal Grandmother     Glaucoma Paternal Grandmother      Social History     Tobacco Use    Smoking status:  Never Smoker    Smokeless tobacco: Never Used   Substance Use Topics    Alcohol use: No    Drug use: No     Review of patient's allergies indicates:   Allergen Reactions    Aspirin Hives    Imitrex [sumatriptan] Palpitations    Penicillins Hives and Swelling    Shellfish containing products Anaphylaxis     seafood    Reglan [metoclopramide hcl] Other (See Comments)     Parkinsonism        Medications: I have reviewed the current medication administration record.    (Not in a hospital admission)      Review of Systems   Constitutional:  Negative for fever.   HENT:  Negative for drooling, trouble swallowing and voice change.    Eyes:  Negative for redness.   Respiratory:  Negative for cough.    Cardiovascular:  Negative for chest pain and leg swelling.   Gastrointestinal:  Negative for nausea and vomiting.   Genitourinary:  Negative for dysuria.   Musculoskeletal:  Negative for arthralgias and myalgias.   Skin:  Negative for rash.   Neurological:  Positive for weakness, numbness and headaches. Negative for dizziness, seizures, facial asymmetry and speech difficulty.   Psychiatric/Behavioral:  Negative for agitation.    Objective:     Vital Signs (Most Recent):  Temp: 98 °F (36.7 °C) (05/17/22 1237)  Pulse: 75 (05/17/22 1435)  Resp: (!) 22 (05/17/22 1435)  BP: (!) 182/90 (05/17/22 1435)  SpO2: 99 % (05/17/22 1435)    Vital Signs Range (Last 24H):  Temp:  [98 °F (36.7 °C)]   Pulse:  [62-83]   Resp:  [18-22]   BP: (164-182)/(90-92)   SpO2:  [98 %-99 %]     Physical Exam  Vitals reviewed.   Constitutional:       General: She is not in acute distress.     Appearance: Normal appearance. She is obese.   HENT:      Head: Normocephalic and atraumatic.      Right Ear: External ear normal.      Left Ear: External ear normal.      Nose: Nose normal.   Eyes:      General: No visual field deficit or scleral icterus.     Extraocular Movements: Extraocular movements intact.   Cardiovascular:      Rate and Rhythm: Normal rate.    Pulmonary:      Effort: Pulmonary effort is normal. No respiratory distress.   Abdominal:      General: There is no distension.   Musculoskeletal:      Cervical back: Normal range of motion.      Right lower leg: No edema.      Left lower leg: No edema.   Skin:     General: Skin is warm and dry.   Neurological:      Mental Status: She is alert and oriented to person, place, and time.      GCS: GCS eye subscore is 4. GCS verbal subscore is 5. GCS motor subscore is 6.      Cranial Nerves: No dysarthria or facial asymmetry.      Sensory: Sensory deficit present.      Motor: Weakness and pronator drift present. No abnormal muscle tone.   Psychiatric:         Mood and Affect: Mood normal.         Behavior: Behavior normal.       Neurological Exam:   LOC: alert  Attention Span: Good   Language: No aphasia  Articulation: No dysarthria  Orientation: Person, Place, Time   Visual Fields: Full  EOM (CN III, IV, VI): Full/intact  Facial Sensation (CN V): Normal  Facial Movement (CN VII): Symmetric facial expression    Motor: Arm left  Normal 5/5  Leg left  Normal 5/5  Arm right  Paresis: 4/5  Leg right Normal 5/5  Cerebellum: No evidence of appendicular or axial ataxia  Sensation: Intact to light touch, temperature and vibration  Tone: Normal tone throughout      Laboratory:  CMP:   Recent Labs   Lab 05/17/22  1433   CALCIUM 9.1   ALBUMIN 3.3*   PROT 11.4*   *   K 6.1*   CO2 24      BUN 8   CREATININE 0.9   ALKPHOS 66   ALT 21   AST 27   BILITOT 0.3     CBC:   Recent Labs   Lab 05/17/22  1433   WBC 4.21   RBC 3.95*   HGB 11.7*   HCT 35.7*      MCV 90   MCH 29.6   MCHC 32.8     Lipid Panel:   Recent Labs   Lab 05/17/22  1433   CHOL 126   LDLCALC 73.0   HDL 38*   TRIG 75     Coagulation:   Recent Labs   Lab 05/17/22  1433   INR 1.3*     Hgb A1C: No results for input(s): HGBA1C in the last 168 hours.  TSH: No results for input(s): TSH in the last 168 hours.    Diagnostic Results:      Brain imaging:    [facility]  5/17/22  No acute intracranial abnormalities.  No significant detrimental changes from prior.           Ella Dunn PA-C  [facility]  Department of Vascular Neurology   [facility] - Emergency Dept

## 2022-05-17 NOTE — TELEPHONE ENCOUNTER
"Returned the pt's call on this afternoon and spoke to her sister Arrian who is with the pt in the ED here @ [facility].  The sister stated that the pt's ICD "went off" today and was brought in via ambulance.  Informed the sister that the pt's remote ICD home monitor only reads at night time therefor a transmission would have not been received.  Also informed the sister that once the doctor has assessed the pt he/she will contact the Device clinic should they want to have the pt's ICD checked in the ED.  Understanding was verbalized.  The sister appreciated the call.   "

## 2022-05-17 NOTE — ED NOTES
MD notified of patients blood pressure, patient states she took all of her blood pressure home medications this morning

## 2022-05-17 NOTE — TELEPHONE ENCOUNTER
----- Message from Vanesa Hayes sent at 5/17/2022  1:14 PM CDT -----  Regarding: Shocked  Pt [phone] is currently in ER and she says the doctor told her that her device shocked her 2 times. She wants to know if any transmissions were received?    Thanks

## 2022-05-18 ENCOUNTER — PATIENT MESSAGE (OUTPATIENT)
Dept: NEUROLOGY | Facility: CLINIC | Age: 57
End: 2022-05-18
Payer: MEDICARE

## 2022-05-18 NOTE — DISCHARGE INSTRUCTIONS
Your CT scan, evaluation by the Neurology doctors, labs, course of illness did not show any signs of immediately dangerous medical conditions.    Please continue take her blood pressure medications at home.  At home you can take Tylenol/acetaminophen as needed for headache.  Your also given a short course of Fioricet which is stronger and will help with headache.  Do not take more than 3000 mg of acetaminophen/Tylenol per 24 hours.  Be careful as there is acetaminophen incised Fioricet.    Keep a blood pressure log (1 blood pressure every morning) and follow-up with your primary care doctor within 1 week for continued management of your blood pressure.    If you develop any new neurologic symptoms, weakness, numbness, tingling, vision changes, trouble speaking, uncontrolled headache, vomiting, fevers, or any other new, worsening worrisome symptoms please return to the emergency department immediately for re-evaluation.

## 2022-05-18 NOTE — TELEPHONE ENCOUNTER
[facility]. Notified by JONAH Dhillon that patient was upset as she was requesting a toradol shot and was informed by the ER staff her botox appt was today.     Met patient in [facility] and escorted to private area. Discussed with patient that her appointment is not until tomorrow 5/19 at 10am. Provided her appt slip. Notified her that Dr. Cortez is currently out of office and unable to order medications. Directed patient to urgent care/PCP for urgent care.     Patient agreeable and denied further needs at this time.

## 2022-05-19 ENCOUNTER — PROCEDURE VISIT (OUTPATIENT)
Dept: NEUROLOGY | Facility: CLINIC | Age: 57
End: 2022-05-19
Payer: MEDICARE

## 2022-05-19 ENCOUNTER — PATIENT OUTREACH (OUTPATIENT)
Dept: ADMINISTRATIVE | Facility: OTHER | Age: 57
End: 2022-05-19
Payer: MEDICARE

## 2022-05-19 VITALS
SYSTOLIC BLOOD PRESSURE: 124 MMHG | BODY MASS INDEX: 50.02 KG/M2 | DIASTOLIC BLOOD PRESSURE: 78 MMHG | HEART RATE: 62 BPM | WEIGHT: 293 LBS | HEIGHT: 64 IN

## 2022-05-19 DIAGNOSIS — F51.01 PRIMARY INSOMNIA: ICD-10-CM

## 2022-05-19 DIAGNOSIS — G43.711 CHRONIC MIGRAINE WITHOUT AURA, WITH INTRACTABLE MIGRAINE, SO STATED, WITH STATUS MIGRAINOSUS: Primary | ICD-10-CM

## 2022-05-19 PROCEDURE — 64615 CHEMODENERV MUSC MIGRAINE: CPT | Mod: PBBFAC | Performed by: PSYCHIATRY & NEUROLOGY

## 2022-05-19 PROCEDURE — 64615 CHEMODENERV MUSC MIGRAINE: CPT | Mod: S$PBB,,, | Performed by: PSYCHIATRY & NEUROLOGY

## 2022-05-19 PROCEDURE — 64615 PR CHEMODENERVATION OF MUSCLE FOR CHRONIC MIGRAINE: ICD-10-PCS | Mod: S$PBB,,, | Performed by: PSYCHIATRY & NEUROLOGY

## 2022-05-19 PROCEDURE — 99499 NO LOS: ICD-10-PCS | Mod: S$PBB,,, | Performed by: PSYCHIATRY & NEUROLOGY

## 2022-05-19 PROCEDURE — 99499 UNLISTED E&M SERVICE: CPT | Mod: S$PBB,,, | Performed by: PSYCHIATRY & NEUROLOGY

## 2022-05-19 RX ORDER — SUVOREXANT 10 MG/1
1 TABLET, FILM COATED ORAL NIGHTLY PRN
Qty: 15 TABLET | Refills: 1 | Status: SHIPPED | OUTPATIENT
Start: 2022-05-19 | End: 2023-07-06 | Stop reason: SDUPTHER

## 2022-05-19 RX ORDER — RIMEGEPANT SULFATE 75 MG/75MG
75 TABLET, ORALLY DISINTEGRATING ORAL ONCE AS NEEDED
Qty: 9 TABLET | Refills: 12 | Status: SHIPPED | OUTPATIENT
Start: 2022-05-19 | End: 2022-05-19

## 2022-05-19 RX ORDER — RIMEGEPANT SULFATE 75 MG/75MG
75 TABLET, ORALLY DISINTEGRATING ORAL ONCE AS NEEDED
Qty: 9 TABLET | Refills: 12 | Status: SHIPPED | OUTPATIENT
Start: 2022-05-19 | End: 2022-07-05

## 2022-05-19 RX ADMIN — ONABOTULINUMTOXINA 200 UNITS: 100 INJECTION, POWDER, LYOPHILIZED, FOR SOLUTION INTRADERMAL; INTRAMUSCULAR at 10:05

## 2022-05-19 NOTE — PROGRESS NOTES
Health Maintenance Due   Topic Date Due    Colorectal Cancer Screening  05/07/2022    COVID-19 Vaccine (4 - Booster for Moderna series) 05/10/2022     Updates were requested from care everywhere.  Chart was reviewed for overdue Proactive [facility] Encounters (EZEQUIEL) topics (CRS, Breast Cancer Screening, Eye exam)  Health Maintenance has been updated.  LINKS immunization registry triggered.  Immunizations were reconciled.  Case Request Endoscopy: EGD (ESOPHAGOGASTRODUODENOSCOPY)Ordered 4/19/2022

## 2022-05-19 NOTE — PROCEDURES
Follow up:  She is grappling with grief of loss of her sister   Pending grief counseling      Prior note:   S/p course of prednisone for GI allergic reaction (scratch throat - seafood related)     Prior note:   Migraine Hz increased during storm - almost daily   One episode of lightheadedness. No SOB, CP       Prior note:   Reports episode of lightheadedness 2 days ago      Prior note:   S/p COVID vaccine      Prior note:   Reports more physical fatigue   taking 2 naps a day   L sided severe migraine lasted 2 days. Took ubrelvy, motrin, zanaflex      Prior note:   3 days of severe L sided HA + nausea   Gradual onset   Ubrelvy, zanaflex, flurbiprofen - not helping   Vision - nml      Prior note:   HA stable   Try Ubrelvy again      Prior note:   independent left and right parietal ice prick HA      Prior note:   HA much improved   Only 2 since last visit      Prior note:   HA are more frequent   Taking 1600 mg motrin + zanaflex   Went to ER recently      Prior note:   HA overall stable in Hz and intensity   Reports a wearing off effect of BOTOX since last week   Taking zanaflex 8 mg TID        Prior note:   HA started 12/24   She feels BOTOX wore off last week   Reports GI distress from taking to many motrin      Prior note:   4/week HA - L vertex   Jabs - vertex either sides      She reports migraine that woke her up in the morning - associated with L side facial droop      Prior note:   She gets acceptable relief for 2.5 months after BOTOX      Prior note:   No recurrent stroke symptoms   starting having whole body tremors since this AM. Took 1 tablet of lorazepam   Last night had a HA, took trazodone       Admit Date: 4/11/2018  2:03 PM   Discharge Date and Time: 4/12/2018  8:30 PM   History of Present Illness: Ms. Chawla is a 53 yo F with afib on Eliquis (last dose this am), prior cardiac arrest with associated acute ischemic stroke with residual mild L sided weakness, CAD with ICD in situ, HTN, and HLD who  presented with acute R sided weakness and sensation changes.  She originally called EMS for palpitations, but developed acute right sided facial droop and RUE weakness at 1:40pm today, after EMS had arrived.  She denies changes to speech or vision.  SBP en route 200/100.  She is ineligible for tPA 2/2 Eliquis use.  She is not suspected to have an LVO.  She will be admitted to [facility] for observation overnight.     Hospital Course (synopsis of major diagnoses, care, treatment, and services provided during the course of the hospital stay): Ms. Chawla was admitted for acute stroke workup. Pt with pacemaker so unable to obtain MRI Brain; CTA H/N demonstrated no evidence acute infarction, though did exhibit noncalcified plaquing of carotid bifurcations and proximal ICAs with < 50% stenosis, so Plavix was added to pt's daily home regimen. Concerned for TIA at this time though Migraine very high on differential due to pt's hx headaches, including presently this admission. Hypertensive urgency/emergency also considered due to pt's very elevated levels with EMS. Per chart review, Eliquis was a new medication since 4/3/18 (had switched from Coumadin), however staff Faraz concerned that pt had a potential event while on Eliquis. She wished to switch to Pradaxa as an alternative DOAC (as it has an option for reversal), however changed to Xarelto per pt's insurance coverage.   While admitted, pt given cocktail for HA which she has received previously at the infusion clinic - IV Magnesium, IM Toradol, 2mg IV Morphine, 500cc NS bolus (IV Benadryl not included this time as pt had received a dose earlier that day prior to contrast imaging.) HA improved somewhat and pt was encouraged to follow up with Dr. Cortez for continued management of botox injections, etc. At that time, pt also found with hyponatremia; d/c'd Clorthalidone and plan was made for pt to [facility]16[facility]18 for repeat CMP to evaluate Na level  and BP check since discontinuing this medication.  Ms. Chawla was evaluated and treated by PT, OT, and SLP who recommend d/c with outpatient PT and OT. She was discharged home 4/12/18 with [facility] follow-up Monday      Prior note:   2 weeks ago BOTOX effect wore off   Used up all her percocet   3 wks h/o:   Reports frequent falls - falling forward, no LOC, no injuries, able to protect falls by hands   triggers - unknown   Also occ stumbling   Dragging L foot   No Pain in back or legs   No numbness or weakness in legs   No B/B incontinence   No lightheadedness      Prior note:    Flare up of TMJ and HA during daughter's wedding   NSAIDS helped   2 weeks ago BOTOX effect wore off      Prior note:   2 weeks ago BOTOX effect wore off   Has severe spasm and pain in the traps catalina   Weather change has provoked severe migraine + nausea   She started coumadin       Prior note:   3 weeks ago BOTOX effect wore off   She reports severe daily HA    During BOTOX periods she feels she  her HA. Much less intense      Prior note:   The patient is a 51-year-old female who [facility]. Since her last visit, she reports growing frustration   about the fact that nothing has helped her with regards to her headaches. She   continues to experience daily headaches. She takes mefenamic acid and   tizanidine every night, which only provides her two hours of relief. She is   unable to sleep because of the refractory headaches. She is incapacitated   because of severe headaches. She reports minimal relief with infusions that she  gets on a periodic basis. She does report an improvement overall with the   Botox. However, this effect has worn off in the last two weeks. She also   reports an episode wherein she fell with loss of consciousness, which was not   provoked. As a result, she injured her ankle and is currently wearing a boot.      Prior note:   since last week - Reports  "vertigo for 6 - 7 minutes upto 3 times daily   Nearly daily severe HA - no response to ponstel , compazine   She is late for her BOTOX - last 2 weeks were painful       Follow up:   R sided Headache is constant max at TMJ on R   Prior note:   She reports new episodes of R face tingling. Sh also reports 2 episodes of blurred vision lasting 15 minutes. These hapended while watching TV. Her pain remains unchanged   Follow up:   2 days of severe HA during Thanksgiving   Pounding bifrontal region   Pain worse over L eye brow   Follow up:   Reports bifrontal severe HA (worse on L) with no nausea with sudden onset . Occurs daily   NO note:  I found no papilledema or other changes to suggest elevated intracranial pressure or other alternative etiology for her headaches. Repeat exam in two years.  HA are less frequent and less intense.   (R levator scapula spasm)   symptoms better with lateral flexion to L   Prior note:   She still has daily HA with severe sharp stabbing pain behind L eye lasting for 15 sec   Prior note:   Daily pain. 2/week - nausea.  Shu Lares RN at 9/17/2014 4:53 PM  Pt presented to clinic for medical advice. Pt is seen by Dr. Paredes and Dr. Cortez.  1) She went ER on 9/13/14 for a migraine. She was given Reglan, Benadryl, MSO4 and IVF. A couple days later she developed an all over body "tremor". She states "I think I have Parkinson's". - Per Dr. Paredes, medication related tremor (Reglan & Benadryl). Informed her it should wear off in a few days. If not, she is to call and let us know.  2) Headaches - triggered by insomnia.   Current meds:  Ketoprofen - she took it once and said her "throat closed up" and she's not supposed to have anything with aspirin in it.  Nortriptyline - she was taking it at night, but it didn't help her sleep, so she stopped it.  Per Dr. Cortez, discontinue Ketoprofen and Nortriptyline. Start Effexor XR 37.5mg QD, tizanidine 4mg BID PRN headache and Klonopin 0.25 - 0.5mg QHS " "PRN. Will schedule pt for sphenocath procedure within the next week.   Prior note:   46 yo woman with history of HTN and asthma, both reportedly controlled, in hospital early 2013 after "passed out" and became unresponsive. CPR in the field for 8 minutes until EMS arrived. Per ED note, on arrival she was found to be in PEA, given epinephrine. Vfib/Vtach was achieved which was shocked x1. Patient converted to sinus tachycardia after shock. I do not know the time course for the above treatment. On arrival to facility patient was in sinus tachycardia with strong pulses, intubated and unresponsive. ET tube placement was confirmed with direct laryngoscopy and good bilateral breath sounds were heard after the tube was pulled back 2 cm. Reported to have "withdrawal" movements in ER during stabilization, then sedated and cooling protocol initiated. Had remarkable   recovery and first seen in clinic in April 2013.   Headache history: cluster   Age of onset - 2012   Location - behind L eye   Nature of pain - sharp   Prodrome - no   Aura - No   Duration of headache - 6-7 min   Time to peak intensity -   Pain scale - range of intensity - 9/10   Frequency - daily   Status Migrainosus history - 1-3 days   Time of day of most headaches- anytime   Associated symptoms with the headache:   Red eyes   swelling of L face   Headache history: Migraine   Age of onset - 2012   Location - bifrontal   Nature of pain - Pounding   Prodrome - no   Aura - No   Duration of headache - > 4 hrs   Time to peak intensity -   Pain scale - range of intensity - 9/10   Frequency - 5/week   Status Migrainosus history - 1-3 days   Time of day of most headaches- anytime   Associated symptoms with the headache:   Meningeal symptoms - photophobia, phonophobia, exercise intolerance +   Nausea/vomitting +   Nasal drainage   Visual blurriness +   Pallor/flushing   Dizziness   Vertigo   Confusion   Impaired concentration +   Pain worsened with physical activity + " "  Neck pain +   Symptoms of increased intracranial pressure:   Whooshing sounds - no   Visual spots/blotches - no   Headache Triggers: unknown   Other comorbid conditions:   Anxiety - no   Motion sickness symptom - no       Treatment history:   Resolution of headache with sleep - yes       Meds tried:   Trigger points involvin/9/15 helped for 1 day   Site: Right   Levator scapula   Pharmacological agent:   0.5% Sensorcaine solution   No complications were noted.  Supraorbital block - helped for 2 days   Tylenol - not help   imitrex - chest tightness   alleve - help   topamax - not helping   Neurontin - not helping   Paxil, Elavil - not help   seroquel - nightmare   Ketoprofen - she took it once and said her "throat closed up" and she's not supposed to have anything with aspirin in it.  Flurbiprofen - constipation   Nortriptyline - she was taking it at night, but it didn't help her sleep, so she stopped it.  reglan - parkinsonism   zanaflex - help   Toradol 30 mg IM inj at home - too expensive   BOTOX round #1 9/3/15 - helped (R levator scapula spasm)   Round #2 12/3/15 - helped   Round#3 3/316 - helped   Round #4 16 - helped   BOTOX#5 9/15/16 - helped     BOTOX#6 - 16 - helped   BOTOX#7 - 3/9/17 - helped    BOTOX#8 - 17 - helped    BOTOX#9 8/10/17 - helped   BOTOX#10 10/19/17 - helped   BOTOX#11 17 - helped   BOTOX#12 3/7/18 - helped   BOTOX#13 18 - helped    BOTOX#14 18 - helped     BOTOX#15 10/19/18 - helped      BOTOX#16 18 - helped   BOTOX#17 3/13/19 - helped    BOTOX#18 19 - helped     BOTOX#19 19 - helped      BOTOX#20 10/11/19 - helped     BOTOX#21 19 - helped   BOTOX#22 3/10/20 - helped    BOTOX#23 20 - helped   BOTOX#24 20 - helped  BOTOX#25 21 - helped   BOTOX#26 4/22/21 - helped   BOTOX#27 21 - helped    BOTOX#28 12/10/21 - helped     AIMOVIG ( week, Oct first week, Nov first wk 140 mg, Dec first wk 140 mg, ) no " benefit   Constipation +      Can not do RFA - pt has pacemaker   Procedure: IOVERA peripheral nerve focus cold therapy (non ablative cryotherapy) 12/30/15 - not help   Indication: Cryotherapy of select peripheral nerves of the head was performed to treat chronic headaches that failed to respond to several preventive pharmacological therapies.   Sedation: None   Informed consent: The procedure, risks, benefits and options were discussed with the patient. There are no contraindications to the procedure. The patient expressed understanding and agreed to the procedure. I verify that I personally obtained the patient's consent prior to the start of the procedure.  Location:  Bilateral Supra orbital nerve (3 cycles on R and 1 cycle on L)   Bilateral Occipital nerve   3 cycles   Pain relief: Pain score reduced 10/10->0/10   9/26 Bilateral Sphenopalatine Ganglion and bilateral Maxillary Branch (Trigeminal Nerve) Block - no help   ONB - not help                                                                                                                                                         georges Pagan RN at 12/31/2014 3:54 PM                           Status: Signed                                       Expand All Collapse All   HEADACHE FASTTRACK  PAIN 7/10 NAUSEA 0/10  ROUND 1: TORADOL, IMITREX, MORPHINE, BENADRYL, AND MAGNESIUM  PAIN 7/10 NAUSEA 0/10  PT STATED SHE WAS READY TO GO HOME AND GO TO SLEEP. PT D/C'ED IN [facility]                              Shannon Pagan RN at 10/5/2015 12:49 PM                           Status: Signed                                       Expand All Collapse All   HEADACHE FASTTRACK  PAIN 8/10 NAUSEA 10/10  FIRST ROUND: tORADOL, BENADRYL, COMPAZINE, IMITREX, MAGNESIUM, AND VALPROATE.  PAIN 1/10 NAUSEA 0/10  PT READY TO GO HOME AND FEELING BETTER. PT LEFT IN [facility] WITH FAMILY MEMBER  TOTAL TIME: [id number]                         Shannon Pagan RN at 10/20/2015 3:05 PM                            Status: Signed                                       Expand All Collapse All   HEADACHE FASTTRACK  PAIN 10/10 NAUSEA 0/10  FIRST ROUND: tORADOL, BENADRYL, COMPAZINE, IMITREX, MAGNESIUM, AND VALPROATE.  BP BEING MONITORED EVERY 15 MIN AND REMAINED 170'S/80-90'S. DR CHOPRA NOTIFIED AND STATED TO MAKE SURE BP DID NOT EXCEED 180 SYSTOLIC OR PT SHOULD REPORT TO ED. BP AT 1500 183/92. DR CHOPRA NOTIFIED AND GAVE ORDER TO STOP INFUSION AND SEND PATIENT TO ED. PT BROUGHT TO ED BY INFUSION NURSE VIA WHEELCHAIR.   PAIN 8/10 NAUSEA 0/10  TOTAL TIME: [id number]                                                     Progress Notes                                               Shannon Pagan, RN at 2/24/2016 1:36 PM       Status: Signed                  Expand All Collapse All   HEADACHE FASTTRACK  PAIN 10/10 NAUSEA 7/10  FIRST ROUND: tORADOL, BENADRYL, AND MORPHINE-BP RANGING FROM 177-203/84-92, HR 60-82  MD NOTIFIED AND GAVE ORDERS TO SEND TO ED. PT LEFT VIA W/C WITH INFUSION NURSE ON WAY TO ED.            Headache risk factors:   H/o TBI - [facility] (2013) + neck sprain   H/o Meningitis - no   F/h Aneurysms - no       Review of Systems   Constitutional: Negative.   HENT: Negative.   Eyes: Negative.   Respiratory: Negative.   Cardiovascular: Negative.   Gastrointestinal: Negative.   Endocrine: Negative.   Genitourinary: Negative.   Musculoskeletal: Negative.   Skin: Negative.   Allergic/Immunologic: Negative.   Hematological: Negative.   Psychiatric/Behavioral: insomnia      Objective:     Physical Exam   Constitutional: She is oriented to person, place, and time. She appears well-developed.   obesity   Psychiatric: She has a normal mood and flat affect. Her behavior is normal. Judgment and thought content normal.   Headache Exam:  Optic disc is flat OU   Temples appear symmetric  No V1,V2,V3 distribution allodynia/hyperalgesia catalina   No facial swelling  No gross TMJ abnormalities   No ptosis   No conj injection   No meningismus   No neck  stiffness  No C spine tenderness  Cervical facet tenderness on palpation catalina   No tender points over trapezium   catalina supraorbital nerve exit point tenderness  catalina occipital nerve exit point tenderness  No auriculotemporal nerve tenderness   Tenderness of levator scapula catalina    Gait - wide based gait                       Assessment:                              1.  Occipital neuralgia                              2.  Cervicalgia                              3.  4  5  6 Chronic migraine without aura, with intractable migraine, so stated, with status migrainosus (post traumatic) post concussive?  Depression   TMJ - R sided   Insomnia - SHIRA      Head CT  Findings: There is no evidence of acute or recent major vascular territory cerebral infarction, parenchymal hemorrhage, or intra-axial mass.  There are no extra-axial masses or abnormal fluid collections.  The ventricular system is within normal limits of size for age and shows no distortion by mass-effect or evidence of hydrocephalus.  There is no fracture or destructive osseous lesion.  The extracranial soft tissues are unremarkable.  The visualized paranasal sinuses and mastoid air cells are clear.   Impression    No acute intracranial abnormality.  ______________________________________     Electronically signed by resident: KRISSY MAYERS MD  Date: 03/14/16  Time: 17:09            Plan:                              1. Prophylactic medication -   Despiramine 25 mg AM (for dizziness) PRN  zoloft 25 mg daily    Aricept 20 mg daily   Neurontin 900 mg TID    Magnesium 200 mg daily  Topamax 200 mg BID   Clonazepam BID for anxiety   On trazodone   Cont B2, B6 supplements   2, Breakthrough headache - zanaflex 8 mg Q8, etodolac  Multiple alternative treatment options were offered to the patient   3. Refrain from over counter pain medications. Discussed medication overuse headache.cont Magnesium 400 mg PO BID   4. Occipital neuralgia - If medical management is ineffective  may consider occipital nerve blocks.   5. I again urged the patient to keep a headache calender.    f/up Dr. Rock for TBI   Vit D supplements    f/up sleep clinic - CPAP - waiting for new machine        Cont AJOVY (April 2019- ) - gap Feb-April 2021)  No side effects      ONB 2 weeks prior to next BOTOX       BOTOX was performed as an indicated therapy for intractable chronic migraine headaches given that the patient failed > 5 prophylactic medications  Botulinum Toxin Injection Procedure   Pre-operative diagnosis: Chronic migraine   Post-operative diagnosis: Same   Procedure: Chemical neurolysis   After risks and benefits were explained including bleeding, infection, worsening of pain, damage to the areas being injected, weakness of muscles, loss of muscle control, dysphagia if injecting the head or neck, facial droop if injecting the facial area, painful injection, allergic or other reaction to the medications being injected, and the failure of the procedure to help the problem, a signed consent was obtained.   The patient was placed in a comfortable area and the sites to be treated were identified.The area to be treated was prepped three times with alcohol and the alcohol allowed to dry. Next, a 30 gauge needle was used to inject the medication in the area to be treated.   Area(s) injected:   Total Botox used: 165 Units   Botox wastage: 35 Units   Injection sites:     muscle bilaterally ( a total of 10 units divided into 2 sites)   Procerus muscle (5 units)   Frontalis muscle bilaterally (a total of 20 units divided into 4 sites)   Temporalis muscle bilaterally (a total of 50 units divided into 8 sites)   Occipitalis muscle bilaterally (a total of 30 units divided into 6 sites)   Cervical paraspinal muscles (a total of 20 units divided into 4 sites)   Trapezius muscle bilaterally (a total of 30 units divided into 6 sites)   Masseter 5 units catalina      Complications: none       RTC for the next Botox  injection: 10 weeks

## 2022-05-23 ENCOUNTER — TELEPHONE (OUTPATIENT)
Dept: PHARMACY | Facility: CLINIC | Age: 57
End: 2022-05-23
Payer: MEDICARE

## 2022-05-24 ENCOUNTER — OFFICE VISIT (OUTPATIENT)
Dept: CARDIOLOGY | Facility: CLINIC | Age: 57
End: 2022-05-24
Payer: MEDICARE

## 2022-05-24 VITALS
HEART RATE: 71 BPM | WEIGHT: 293 LBS | DIASTOLIC BLOOD PRESSURE: 82 MMHG | HEIGHT: 64 IN | SYSTOLIC BLOOD PRESSURE: 179 MMHG | BODY MASS INDEX: 50.02 KG/M2

## 2022-05-24 DIAGNOSIS — Z95.810 BIVENTRICULAR AUTOMATIC IMPLANTABLE CARDIOVERTER DEFIBRILLATOR IN SITU: ICD-10-CM

## 2022-05-24 DIAGNOSIS — F33.1 DEPRESSION, MAJOR, RECURRENT, MODERATE: ICD-10-CM

## 2022-05-24 DIAGNOSIS — Z86.73 HISTORY OF TIA (TRANSIENT ISCHEMIC ATTACK): ICD-10-CM

## 2022-05-24 DIAGNOSIS — I44.2 COMPLETE AV BLOCK: ICD-10-CM

## 2022-05-24 DIAGNOSIS — Z95.0 PACEMAKER: ICD-10-CM

## 2022-05-24 DIAGNOSIS — E78.2 MIXED HYPERLIPIDEMIA: ICD-10-CM

## 2022-05-24 DIAGNOSIS — I48.0 PAROXYSMAL ATRIAL FIBRILLATION: ICD-10-CM

## 2022-05-24 DIAGNOSIS — Z86.74 HISTORY OF SUDDEN CARDIAC ARREST: ICD-10-CM

## 2022-05-24 DIAGNOSIS — I10 ESSENTIAL HYPERTENSION: Primary | ICD-10-CM

## 2022-05-24 DIAGNOSIS — F43.21 GRIEF REACTION: ICD-10-CM

## 2022-05-24 PROBLEM — F43.20 GRIEF REACTION: Status: ACTIVE | Noted: 2022-04-28

## 2022-05-24 PROCEDURE — 99215 OFFICE O/P EST HI 40 MIN: CPT | Mod: PBBFAC | Performed by: INTERNAL MEDICINE

## 2022-05-24 PROCEDURE — 99214 OFFICE O/P EST MOD 30 MIN: CPT | Mod: S$PBB,GC,, | Performed by: INTERNAL MEDICINE

## 2022-05-24 PROCEDURE — 99214 PR OFFICE/OUTPT VISIT, EST, LEVL IV, 30-39 MIN: ICD-10-PCS | Mod: S$PBB,GC,, | Performed by: INTERNAL MEDICINE

## 2022-05-24 PROCEDURE — 99999 PR PBB SHADOW E&M-EST. PATIENT-LVL V: ICD-10-PCS | Mod: PBBFAC,GC,, | Performed by: INTERNAL MEDICINE

## 2022-05-24 PROCEDURE — 99999 PR PBB SHADOW E&M-EST. PATIENT-LVL V: CPT | Mod: PBBFAC,GC,, | Performed by: INTERNAL MEDICINE

## 2022-05-24 RX ORDER — AMLODIPINE BESYLATE 5 MG/1
5 TABLET ORAL DAILY
Qty: 90 EACH | Refills: 3 | Status: SHIPPED | OUTPATIENT
Start: 2022-05-24 | End: 2023-04-19 | Stop reason: SDUPTHER

## 2022-05-24 NOTE — PROGRESS NOTES
PCP - Tiffany Mina MD  Referring Physician:     Subjective:   Patient ID:  Amna Chawla is a 56 y.o. y.o. female with cardiac arrest in 2013 s/p ICD (negative CTA at that time), pAF, CVA, HTN, HLD, morbid obesity (BMI 54), SHIRA here for the follow up evaluation. She has seen Dr. Gaspar and Dr. Ceron in the past.     She has no exertional chest pain or heart failure symptoms. She has no palpitations and no claudication. Denies MERCHANT, orthopnea, PND, edema, syncope. She has never smoked, drinks alcohol very seldom. No family history of CAD or SCD. Father  at age 70. Recently her sister passed away unexpectedly due to MVA. This was terribly stressful for her.      Was seen in ER recently for hypertension causing headaches with BP >180/90 mmHg. This has all been exacerbated after her sisters death. She is grieving and has a lot of emotional stress. She was also seen in the ER for right shoulder pain that was attributed to shoulder pain.     She previously experienced chest that was workuped up with stress test that was negative for ischemia. She does not have a routine exercise program. Does ADLs independently. Her exercise tolerance is of >4 METS. No exertional angina.  She denies any chest symptoms. No heart failure, claudication or palpitations.     History:     Social History     Tobacco Use    Smoking status: Never Smoker    Smokeless tobacco: Never Used   Substance Use Topics    Alcohol use: No     Family History   Problem Relation Age of Onset    Hypertension Mother     COPD Unknown     Heart failure Unknown     Glaucoma Maternal Grandmother     Glaucoma Paternal Grandmother        Meds:     Review of patient's allergies indicates:   Allergen Reactions    Aspirin Hives    Imitrex [sumatriptan] Palpitations    Penicillins Hives and Swelling    Shellfish containing products Anaphylaxis     seafood    Reglan [metoclopramide hcl] Other (See Comments)     Parkinsonism        Current Outpatient  Medications:     acetaminophen (TYLENOL) 325 MG tablet, Take 2 tablets (650 mg total) by mouth every 6 (six) hours as needed for Pain. (Patient not taking: Reported on 5/19/2022), Disp: 30 tablet, Rfl: 0    ARIPiprazole (ABILIFY) 15 MG Tab, Take 1 tablet (15 mg total) by mouth once daily., Disp: 90 tablet, Rfl: 1    blood sugar diagnostic Strp, 1 strip by Misc.(Non-Drug; Combo Route) route 2 (two) times daily., Disp: 200 strip, Rfl: 3    blood-glucose meter kit, Please provide with insurance covered meter, Disp: 1 each, Rfl: 0    butalbital-acetaminophen-caffeine -40 mg (FIORICET, ESGIC) -40 mg per tablet, Take 1 tablet by mouth every 4 (four) hours as needed for Pain. (Patient not taking: Reported on 5/19/2022), Disp: 8 tablet, Rfl: 0    carvediloL (COREG) 25 MG tablet, TAKE 1 TABLET(25 MG) BY MOUTH TWICE DAILY WITH MEALS, Disp: 60 tablet, Rfl: 11    clonazePAM (KLONOPIN) 1 MG tablet, Take 1 tablet (1 mg total) by mouth daily as needed for Anxiety., Disp: 30 tablet, Rfl: 5    clopidogreL (PLAVIX) 75 mg tablet, Take 1 tablet (75 mg total) by mouth once daily., Disp: 90 tablet, Rfl: 3    cyanocobalamin, vitamin B-12, 1,000 mcg Subl, Place 1,000 mcg under the tongue once daily. (Patient not taking: Reported on 5/19/2022), Disp: 90 tablet, Rfl: 3    diclofenac sodium (VOLTAREN) 1 % Gel, Apply 2 g topically 4 (four) times daily., Disp: 100 g, Rfl: 1    donepezil (ARICEPT) 10 MG tablet, Take 1 tablet (10 mg total) by mouth 2 (two) times daily., Disp: 180 tablet, Rfl: 3    EPINEPHrine (EPIPEN) 0.3 mg/0.3 mL AtIn, Inject 0.3 mLs (0.3 mg total) into the muscle once. for 1 dose, Disp: 0.3 mL, Rfl: 0    etodolac (LODINE) 500 MG tablet, Take 1 tablet (500 mg total) by mouth 2 (two) times daily as needed (migraine)., Disp: 30 tablet, Rfl: 12    flurbiprofen (ANSAID) 100 MG tablet, Take 1 tablet (100 mg total) by mouth 2 (two) times daily as needed (migraine)., Disp: 12 tablet, Rfl: 12    fluticasone  propionate (FLONASE) 50 mcg/actuation nasal spray, 1 spray (50 mcg total) by Each Nostril route once daily. (Patient not taking: Reported on 5/19/2022), Disp: 16 g, Rfl: 3    fremanezumab-vfrm (AJOVY) 225 mg/1.5 mL injection, Inject 1.5 mLs (225 mg total) into the skin every 28 days. (Patient not taking: Reported on 5/19/2022), Disp: 1 each, Rfl: 12    furosemide (LASIX) 20 MG tablet, Take 1 tablet (20 mg total) by mouth once daily., Disp: 30 tablet, Rfl: 6    gabapentin (NEURONTIN) 300 MG capsule, Take 3 capsules (900 mg total) by mouth 3 (three) times daily., Disp: 810 capsule, Rfl: 12    lancets Misc, 1 Device by Misc.(Non-Drug; Combo Route) route 2 (two) times daily., Disp: 200 each, Rfl: 3    losartan (COZAAR) 100 MG tablet, Take 1 tablet (100 mg total) by mouth once daily., Disp: 90 tablet, Rfl: 3    magnesium 200 mg Tab, Take 200 mg by mouth once daily. (Patient taking differently: Take 200 mg by mouth every other day.), Disp: 30 each, Rfl: 12    metFORMIN (GLUCOPHAGE) 500 MG tablet, Take 1 tablet (500 mg total) by mouth 2 (two) times daily with meals. (Patient not taking: Reported on 5/19/2022), Disp: 180 tablet, Rfl: 3    mupirocin (BACTROBAN) 2 % ointment, Apply to affected area 3 times daily, Disp: 22 g, Rfl: 1    omeprazole (PRILOSEC) 40 MG capsule, Take 1 capsule (40 mg total) by mouth once daily. Take 30 minutes before breakfast, Disp: 90 capsule, Rfl: 6    ondansetron (ZOFRAN-ODT) 4 MG TbDL, Take 1 tablet (4 mg total) by mouth every 12 (twelve) hours as needed (nausea)., Disp: 40 tablet, Rfl: 12    ondansetron (ZOFRAN-ODT) 4 MG TbDL, Take 1 tablet (4 mg total) by mouth every 12 (twelve) hours as needed (nausea)., Disp: 40 tablet, Rfl: 12    rimegepant (NURTEC) 75 mg odt, Take 1 tablet (75 mg total) by mouth once as needed. Place ODT tablet on the tongue; alternatively the ODT tablet may be placed under the tongue, Disp: 9 tablet, Rfl: 12    rivaroxaban (XARELTO) 20 mg Tab, TAKE 1 TABLET  BY MOUTH DAILY EVENING MEAL WITH DINNER, Disp: 30 tablet, Rfl: 11    rosuvastatin (CRESTOR) 40 MG Tab, Take 1 tablet (40 mg total) by mouth every evening., Disp: 90 tablet, Rfl: 3    sertraline (ZOLOFT) 100 MG tablet, Take 1.5 tablets (150 mg total) by mouth once daily., Disp: 135 tablet, Rfl: 3    silver sulfADIAZINE 1% (SILVADENE) 1 % cream, Apply topically 2 (two) times daily., Disp: 50 g, Rfl: 0    suvorexant (BELSOMRA) 10 mg Tab, Take 1 tablet by mouth nightly as needed (insomnia)., Disp: 15 tablet, Rfl: 1    tiaGABine (GABITRIL) 4 MG tablet, Take 1 tablet (4 mg total) by mouth every evening., Disp: 30 tablet, Rfl: 12    tiZANidine (ZANAFLEX) 4 MG tablet, TAKE 1 TABLET(4 MG) BY MOUTH EVERY 6 HOURS AS NEEDED, Disp: 90 tablet, Rfl: 12    tiZANidine (ZANAFLEX) 4 MG tablet, Take 1 tablet (4 mg total) by mouth every 6 (six) hours as needed., Disp: 90 tablet, Rfl: 12    tiZANidine (ZANAFLEX) 4 MG tablet, Take 1 tablet (4 mg total) by mouth every 6 (six) hours as needed., Disp: 90 tablet, Rfl: 12    traZODone (DESYREL) 100 MG tablet, Take 1 tablet (100 mg total) by mouth every evening., Disp: 30 tablet, Rfl: 11    TRUE METRIX GLUCOSE METER Misc, TEST BG BID, Disp: , Rfl: 0    ubrogepant (UBROGEPANT) 100 mg tablet, Take 1 tablet (100 mg total) by mouth 2 (two) times daily as needed for Migraine., Disp: 10 tablet, Rfl: 12    zolpidem (AMBIEN) 10 mg Tab, Take 1 tablet (10 mg total) by mouth nightly as needed (insomnia)., Disp: 30 tablet, Rfl: 5    Current Facility-Administered Medications:     ketorolac injection 60 mg, 60 mg, Intramuscular, 1 time in Clinic/HOD, David Cortez MD    ketorolac injection 60 mg, 60 mg, Intramuscular, 1 time in Clinic/HOD, David Cortez MD    onabotulinumtoxina injection 200 Units, 200 Units, Intramuscular, Q90 Days, David Cortez MD, 200 Units at 10/19/18 1713    onabotulinumtoxina injection 200 Units, 200 Units, Intramuscular, Q90 Days, David Cortez MD, 200 Units at  12/28/18 1106    onabotulinumtoxina injection 200 Units, 200 Units, Intramuscular, Q90 Days, David Cortez MD, 200 Units at 03/13/19 1504    onabotulinumtoxina injection 200 Units, 200 Units, Intramuscular, Q90 Days, David Cortez MD, 200 Units at 05/23/19 1123    onabotulinumtoxina injection 200 Units, 200 Units, Intramuscular, Q90 Days, David Cortez MD, 200 Units at 10/11/19 1112    onabotulinumtoxina injection 200 Units, 200 Units, Intramuscular, Q90 Days, David Cortez MD, 200 Units at 12/19/19 1036    onabotulinumtoxina injection 200 Units, 200 Units, Intramuscular, Q10 weeks, David Cortez MD, 200 Units at 03/10/20 1036    onabotulinumtoxina injection 200 Units, 200 Units, Intramuscular, Q90 Days, David Cortez MD, 200 Units at 06/26/20 1538    onabotulinumtoxina injection 200 Units, 200 Units, Intramuscular, q12 weeks, David Cortez MD, 200 Units at 05/19/22 1002    Facility-Administered Medications Ordered in Other Visits:     lactated ringers infusion, , Intravenous, Continuous, Humberto Angel MD, Stopped at 02/11/20 0801    lidocaine (PF) 10 mg/ml (1%) injection 5 mg, 0.5 mL, Intradermal, Once, Humberto Angel MD    Review of Systems   Constitutional: Negative for chills, fever, malaise/fatigue and weight loss.   HENT: Negative for ear pain, hearing loss and tinnitus.    Respiratory: Negative for cough, hemoptysis, sputum production and shortness of breath.    Cardiovascular: Negative for chest pain, palpitations, orthopnea, claudication, leg swelling and PND.   Gastrointestinal: Negative for abdominal pain, diarrhea, heartburn, nausea and vomiting.   Genitourinary: Negative for dysuria and urgency.   Musculoskeletal: Negative for back pain, myalgias and neck pain.   Neurological: Negative for dizziness and headaches.   Psychiatric/Behavioral: Negative for depression.       Objective:   BP (!) 179/82 (BP Location: Left arm, Patient Position: Sitting, BP Method: Medium (Automatic))   Pulse  "71   Ht 5' 4" (1.626 m)   Wt (!) 141.8 kg (312 lb 9.8 oz)   LMP 02/04/2016 (Approximate)   BMI 53.66 kg/m²   Physical Exam  Constitutional:       Appearance: Normal appearance.   Cardiovascular:      Rate and Rhythm: Normal rate and regular rhythm.      Pulses: Normal pulses.           Carotid pulses are 2+ on the right side and 2+ on the left side.       Radial pulses are 2+ on the right side and 2+ on the left side.        Femoral pulses are 2+ on the right side and 2+ on the left side.       Popliteal pulses are 2+ on the right side and 2+ on the left side.        Dorsalis pedis pulses are 2+ on the right side and 2+ on the left side.        Posterior tibial pulses are 2+ on the right side and 2+ on the left side.      Heart sounds: No murmur heard.  Pulmonary:      Effort: Pulmonary effort is normal. No respiratory distress.      Breath sounds: No stridor. No wheezing.   Abdominal:      General: Abdomen is flat. Bowel sounds are normal. There is no distension.      Palpations: Abdomen is soft.   Musculoskeletal:         General: No swelling. Normal range of motion.   Skin:     General: Skin is warm and dry.      Capillary Refill: Capillary refill takes less than 2 seconds.   Neurological:      General: No focal deficit present.      Mental Status: She is alert and oriented to person, place, and time.       Labs:     Lab Results   Component Value Date     (L) 05/17/2022    K 4.7 05/17/2022     05/17/2022    CO2 21 (L) 05/17/2022    BUN 8 05/17/2022    CREATININE 0.8 05/17/2022    ANIONGAP 6 (L) 05/17/2022     Lab Results   Component Value Date    HGBA1C 6.4 (H) 04/12/2022     Lab Results   Component Value Date    BNP <10 12/10/2021    BNP 17 11/01/2020    BNP 11 02/02/2014       Lab Results   Component Value Date    WBC 4.21 05/17/2022    HGB 11.7 (L) 05/17/2022    HCT 27 (L) 05/17/2022    HCT 35.7 (L) 05/17/2022     05/17/2022    GRAN 2.2 05/17/2022    GRAN 53.2 05/17/2022     Lab Results "   Component Value Date    CHOL 126 05/17/2022    HDL 38 (L) 05/17/2022    LDLCALC 73.0 05/17/2022    TRIG 75 05/17/2022       Lab Results   Component Value Date     (L) 05/17/2022    K 4.7 05/17/2022     05/17/2022    CO2 21 (L) 05/17/2022    BUN 8 05/17/2022    CREATININE 0.8 05/17/2022    ANIONGAP 6 (L) 05/17/2022     Lab Results   Component Value Date    HGBA1C 6.4 (H) 04/12/2022     Lab Results   Component Value Date    BNP <10 12/10/2021    BNP 17 11/01/2020    BNP 11 02/02/2014    Lab Results   Component Value Date    WBC 4.21 05/17/2022    HGB 11.7 (L) 05/17/2022    HCT 27 (L) 05/17/2022    HCT 35.7 (L) 05/17/2022     05/17/2022    GRAN 2.2 05/17/2022    GRAN 53.2 05/17/2022     Lab Results   Component Value Date    CHOL 126 05/17/2022    HDL 38 (L) 05/17/2022    LDLCALC 73.0 05/17/2022    TRIG 75 05/17/2022                Cardiovascular Imaging:     Echo:   EF   Date Value Ref Range Status   12/27/2021 58 % Final     Nuc Stress EF   Date Value Ref Range Status   02/08/2022 55 % Final     Nuc Rest EF   Date Value Ref Range Status   02/08/2022 53  Final       PET stress test 2022:     The relative PET images show no clinically significant regional resting or stress induced perfusion abnormalities.    The whole heart absolute myocardial perfusion values averaged 1.02 cc/min/g at rest, which is elevated; 2.21 cc/min/g at stress, which is normal; and CFR is 2.19 , which is mildly reduced in part due to elevated resting flow.    CT attenuation images demonstrate no coronary calcifications and no aortic calcifications.    The gated perfusion images showed an ejection fraction of 53% at rest and 55% during stress. A normal ejection fraction is greater than 53%.    The wall motion is normal at rest and during stress.    The LV cavity size is normal at rest and stress.    The EKG portion of this study is uninterpretable.    There were no arrhythmias during stress.    The patient reported no  chest pain during the stress test.    When compared to the previous study from 9/15/2020, myocardial stress flows and CFR are much improved on the current study and similar to her study in 2017.    Assessment & Plan:     1. Essential hypertension    2. Paroxysmal atrial fibrillation    3. Biventricular automatic implantable cardioverter defibrillator in situ    4. History of sudden cardiac arrest    5. Mixed hyperlipidemia    6. Complete AV block    7. Pacemaker    8. History of TIA (transient ischemic attack)    9. Grief reaction    10. Depression, major, recurrent, moderate      HTN - Uncontrolled  Enrolled in digital hypertension program but has been overwhelmed by her recent family loss and has not been logging in her pressures.  Recent visit tot he ER for symptomatic HTN  Continue present treatment (losartan and coreg) and will add Amlodipine 5 mg    Non-cardiac chest pain   Recent visit to ER for shoulder pain and id being referred for steroid injections. She has a negative stress test after an ER visit for chest pain. Further, she has no exertional symptoms. She is concerned about getting covid virus and avoids crowds. Encouraged to increase activities by walking.    PAF  Continue Carvedilol, Rivaroxaban  Managed by EP  Her ICD is operating normally per last interrogation.     Grief  Continue with healthy lifestyle habits.   Seeing a psychiatrics and will see a grief specialist.    HLD  LDL 70 (2021)      SHIRA  CPAP recalled. Referral already sent to sleep medicine who recommended to continue using her CPAP until [facility] contacts her for device exchange. She is still waiting for them to send her a new machine.     Morbid obesity  We discussed diet and lifestyle modification     History of CVA  Continue Plavix per neurology (can beheld for EGD)  Renewed prescription        RTC 1 mo    Signed:  Alok Grossman M.D.  Cardiovascular Fellow  [facility]

## 2022-06-02 ENCOUNTER — HOSPITAL ENCOUNTER (OUTPATIENT)
Facility: HOSPITAL | Age: 57
Discharge: HOME | End: 2022-06-02
Attending: INTERNAL MEDICINE | Admitting: INTERNAL MEDICINE
Payer: MEDICARE

## 2022-06-02 ENCOUNTER — ANESTHESIA (OUTPATIENT)
Dept: ENDOSCOPY | Facility: HOSPITAL | Age: 57
End: 2022-06-02
Payer: MEDICARE

## 2022-06-02 ENCOUNTER — ANESTHESIA EVENT (OUTPATIENT)
Dept: ENDOSCOPY | Facility: HOSPITAL | Age: 57
End: 2022-06-02
Payer: MEDICARE

## 2022-06-02 ENCOUNTER — DOCUMENTATION ONLY (OUTPATIENT)
Dept: CARDIOLOGY | Facility: HOSPITAL | Age: 57
End: 2022-06-02
Payer: MEDICARE

## 2022-06-02 ENCOUNTER — PES CALL (OUTPATIENT)
Dept: ADMINISTRATIVE | Facility: CLINIC | Age: 57
End: 2022-06-02
Payer: MEDICARE

## 2022-06-02 VITALS
OXYGEN SATURATION: 100 % | HEIGHT: 64 IN | RESPIRATION RATE: 17 BRPM | BODY MASS INDEX: 50.02 KG/M2 | HEART RATE: 64 BPM | WEIGHT: 293 LBS | SYSTOLIC BLOOD PRESSURE: 120 MMHG | TEMPERATURE: 98 F | DIASTOLIC BLOOD PRESSURE: 78 MMHG

## 2022-06-02 DIAGNOSIS — K21.9 GASTROESOPHAGEAL REFLUX DISEASE WITHOUT ESOPHAGITIS: Primary | ICD-10-CM

## 2022-06-02 DIAGNOSIS — K21.9 GERD (GASTROESOPHAGEAL REFLUX DISEASE): ICD-10-CM

## 2022-06-02 DIAGNOSIS — Z86.010 HISTORY OF COLON POLYPS: ICD-10-CM

## 2022-06-02 DIAGNOSIS — Z12.11 ENCOUNTER FOR SCREENING COLONOSCOPY: ICD-10-CM

## 2022-06-02 PROCEDURE — 88342 IMHCHEM/IMCYTCHM 1ST ANTB: CPT | Mod: 26,,, | Performed by: PATHOLOGY

## 2022-06-02 PROCEDURE — 27201012 HC FORCEPS, HOT/COLD, DISP: Performed by: INTERNAL MEDICINE

## 2022-06-02 PROCEDURE — D9220A PRA ANESTHESIA: Mod: ANES,,, | Performed by: ANESTHESIOLOGY

## 2022-06-02 PROCEDURE — 88305 TISSUE EXAM BY PATHOLOGIST: CPT | Mod: 59 | Performed by: PATHOLOGY

## 2022-06-02 PROCEDURE — 88342 CHG IMMUNOCYTOCHEMISTRY: ICD-10-PCS | Mod: 26,,, | Performed by: PATHOLOGY

## 2022-06-02 PROCEDURE — 88305 TISSUE EXAM BY PATHOLOGIST: ICD-10-PCS | Mod: 26,,, | Performed by: PATHOLOGY

## 2022-06-02 PROCEDURE — 43239 EGD BIOPSY SINGLE/MULTIPLE: CPT | Mod: 51,GC,, | Performed by: INTERNAL MEDICINE

## 2022-06-02 PROCEDURE — 43239 PR EGD, FLEX, W/BIOPSY, SGL/MULTI: ICD-10-PCS | Mod: 51,GC,, | Performed by: INTERNAL MEDICINE

## 2022-06-02 PROCEDURE — 88305 TISSUE EXAM BY PATHOLOGIST: CPT | Mod: 26,,, | Performed by: PATHOLOGY

## 2022-06-02 PROCEDURE — 88342 IMHCHEM/IMCYTCHM 1ST ANTB: CPT | Performed by: PATHOLOGY

## 2022-06-02 PROCEDURE — 37000009 HC ANESTHESIA EA ADD 15 MINS: Performed by: INTERNAL MEDICINE

## 2022-06-02 PROCEDURE — 45385 COLONOSCOPY W/LESION REMOVAL: CPT | Mod: PT | Performed by: INTERNAL MEDICINE

## 2022-06-02 PROCEDURE — 45385 PR COLONOSCOPY,REMV LESN,SNARE: ICD-10-PCS | Mod: PT,GC,, | Performed by: INTERNAL MEDICINE

## 2022-06-02 PROCEDURE — D9220A PRA ANESTHESIA: ICD-10-PCS | Mod: CRNA,,, | Performed by: NURSE ANESTHETIST, CERTIFIED REGISTERED

## 2022-06-02 PROCEDURE — 27201089 HC SNARE, DISP (ANY): Performed by: INTERNAL MEDICINE

## 2022-06-02 PROCEDURE — 43239 EGD BIOPSY SINGLE/MULTIPLE: CPT | Performed by: INTERNAL MEDICINE

## 2022-06-02 PROCEDURE — D9220A PRA ANESTHESIA: Mod: CRNA,,, | Performed by: NURSE ANESTHETIST, CERTIFIED REGISTERED

## 2022-06-02 PROCEDURE — D9220A PRA ANESTHESIA: ICD-10-PCS | Mod: ANES,,, | Performed by: ANESTHESIOLOGY

## 2022-06-02 PROCEDURE — 63600175 PHARM REV CODE 636 W HCPCS: Performed by: NURSE ANESTHETIST, CERTIFIED REGISTERED

## 2022-06-02 PROCEDURE — 25000003 PHARM REV CODE 250: Performed by: INTERNAL MEDICINE

## 2022-06-02 PROCEDURE — 45385 COLONOSCOPY W/LESION REMOVAL: CPT | Mod: PT,GC,, | Performed by: INTERNAL MEDICINE

## 2022-06-02 PROCEDURE — 37000008 HC ANESTHESIA 1ST 15 MINUTES: Performed by: INTERNAL MEDICINE

## 2022-06-02 PROCEDURE — 25000003 PHARM REV CODE 250: Performed by: NURSE ANESTHETIST, CERTIFIED REGISTERED

## 2022-06-02 RX ORDER — HALOPERIDOL 5 MG/ML
0.5 INJECTION INTRAMUSCULAR ONCE
Status: DISCONTINUED | OUTPATIENT
Start: 2022-06-02 | End: 2022-06-02 | Stop reason: HOSPADM

## 2022-06-02 RX ORDER — PROPOFOL 10 MG/ML
VIAL (ML) INTRAVENOUS CONTINUOUS PRN
Status: DISCONTINUED | OUTPATIENT
Start: 2022-06-02 | End: 2022-06-02

## 2022-06-02 RX ORDER — PROPOFOL 10 MG/ML
VIAL (ML) INTRAVENOUS
Status: DISCONTINUED | OUTPATIENT
Start: 2022-06-02 | End: 2022-06-02

## 2022-06-02 RX ORDER — HYDROMORPHONE HYDROCHLORIDE 1 MG/ML
0.2 INJECTION, SOLUTION INTRAMUSCULAR; INTRAVENOUS; SUBCUTANEOUS EVERY 5 MIN PRN
Status: DISCONTINUED | OUTPATIENT
Start: 2022-06-02 | End: 2022-06-02 | Stop reason: HOSPADM

## 2022-06-02 RX ORDER — PHENYLEPHRINE HCL IN 0.9% NACL 1 MG/10 ML
SYRINGE (ML) INTRAVENOUS
Status: DISCONTINUED | OUTPATIENT
Start: 2022-06-02 | End: 2022-06-02

## 2022-06-02 RX ORDER — SODIUM CHLORIDE 9 MG/ML
INJECTION, SOLUTION INTRAVENOUS CONTINUOUS
Status: DISCONTINUED | OUTPATIENT
Start: 2022-06-02 | End: 2022-06-02 | Stop reason: HOSPADM

## 2022-06-02 RX ORDER — LIDOCAINE HYDROCHLORIDE 20 MG/ML
INJECTION INTRAVENOUS
Status: DISCONTINUED | OUTPATIENT
Start: 2022-06-02 | End: 2022-06-02

## 2022-06-02 RX ORDER — OXYCODONE HYDROCHLORIDE 5 MG/1
5 TABLET ORAL
Status: DISCONTINUED | OUTPATIENT
Start: 2022-06-02 | End: 2022-06-02 | Stop reason: HOSPADM

## 2022-06-02 RX ORDER — PROCHLORPERAZINE EDISYLATE 5 MG/ML
5 INJECTION INTRAMUSCULAR; INTRAVENOUS EVERY 30 MIN PRN
Status: DISCONTINUED | OUTPATIENT
Start: 2022-06-02 | End: 2022-06-02 | Stop reason: HOSPADM

## 2022-06-02 RX ADMIN — PROPOFOL 50 MG: 10 INJECTION, EMULSION INTRAVENOUS at 08:06

## 2022-06-02 RX ADMIN — GLYCOPYRROLATE 0.2 MG: 0.2 INJECTION, SOLUTION INTRAMUSCULAR; INTRAVENOUS at 08:06

## 2022-06-02 RX ADMIN — SODIUM CHLORIDE: 0.9 INJECTION, SOLUTION INTRAVENOUS at 08:06

## 2022-06-02 RX ADMIN — LIDOCAINE HYDROCHLORIDE 100 MG: 20 INJECTION, SOLUTION INTRAVENOUS at 08:06

## 2022-06-02 RX ADMIN — Medication 200 MCG: at 09:06

## 2022-06-02 RX ADMIN — Medication 100 MCG: at 08:06

## 2022-06-02 RX ADMIN — Medication 200 MCG/KG/MIN: at 08:06

## 2022-06-02 NOTE — TRANSFER OF CARE
"Anesthesia Transfer of Care Note    Patient: Amna Chawla    Procedure(s) Performed: Procedure(s) (LRB):  EGD (ESOPHAGOGASTRODUODENOSCOPY) (N/A)  COLONOSCOPY (N/A)    Patient location: PACU    Anesthesia Type: general    Transport from OR: Transported from OR on 6-10 L/min O2 by face mask with adequate spontaneous ventilation    Post pain: adequate analgesia    Post assessment: tolerated procedure well and no apparent anesthetic complications    Post vital signs: stable    Level of consciousness: sedated    Nausea/Vomiting: no nausea/vomiting    Complications: none    Transfer of care protocol was followed      Last vitals:   Visit Vitals  BP (!) 149/81   Pulse 75   Temp 36.9 °C (98.4 °F)   Resp 18   Ht 5' 4" (1.626 m)   Wt (!) 140.6 kg (310 lb)   LMP 02/04/2016 (Approximate)   SpO2 100%   Breastfeeding No   BMI 53.21 kg/m²     "

## 2022-06-02 NOTE — PROGRESS NOTES
Device interrogation and reprogramming performed in the endoscopy suite  BiV AICD, Biotronik, underlying CHB  Reprogrammed to DOO 80 bpm with tachy therapies disabled for upper endo/colon with possible electrocautery.  Please call [phone] post op for reprogramming back to baseline settings

## 2022-06-02 NOTE — PROVATION PATIENT INSTRUCTIONS
Discharge Summary/Instructions after an Endoscopic Procedure  Patient Name: Amna Chawla  Patient MRN: [id number]  Patient YOB: 1965 Thursday, June 2, 2022  Juan Coffey MD  Dear patient,  As a result of recent federal legislation (The Federal Cures Act), you may   receive lab or pathology results from your procedure in your [facility]   account before your physician is able to contact you. Your physician or   their representative will relay the results to you with their   recommendations at their soonest availability.  Thank you,  RESTRICTIONS:  During your procedure today, you received medications for sedation.  These   medications may affect your judgment, balance and coordination.  Therefore,   for 24 hours, you have the following restrictions:   - DO NOT drive a car, operate machinery, make legal/financial decisions,   sign important papers or drink alcohol.    ACTIVITY:  Today: no heavy lifting, straining or running due to procedural   sedation/anesthesia.  The following day: return to full activity including work.  DIET:  Eat and drink normally unless instructed otherwise.     TREATMENT FOR COMMON SIDE EFFECTS:  - Mild abdominal pain, nausea, belching, bloating or excessive gas:  rest,   eat lightly and use a heating pad.  - Sore Throat: treat with throat lozenges and/or gargle with warm salt   water.  - Because air was used during the procedure, expelling large amounts of air   from your rectum or belching is normal.  - If a bowel prep was taken, you may not have a bowel movement for 1-3 days.    This is normal.  SYMPTOMS TO WATCH FOR AND REPORT TO YOUR PHYSICIAN:  1. Abdominal pain or bloating, other than gas cramps.  2. Chest pain.  3. Back pain.  4. Signs of infection such as: chills or fever occurring within 24 hours   after the procedure.  5. Rectal bleeding, which would show as bright red, maroon, or black stools.   (A tablespoon of blood from the rectum is not serious, especially if    hemorrhoids are present.)  6. Vomiting.  7. Weakness or dizziness.  GO DIRECTLY TO THE NEAREST EMERGENCY ROOM IF YOU HAVE ANY OF THE FOLLOWING:      Difficulty breathing              Chills and/or fever over 101 F   Persistent vomiting and/or vomiting blood   Severe abdominal pain   Severe chest pain   Black, tarry stools   Bleeding- more than one tablespoon   Any other symptom or condition that you feel may need urgent attention  Your doctor recommends these additional instructions:  If any biopsies were taken, your doctors clinic will contact you in 1 to 2   weeks with any results.  - Await pathology results.   - Perform a colonoscopy now, for surveillance of polyps.   - See colonoscopy report for further recommendations.   For questions, problems or results please call your physician - Juan Coffey MD at Work:  [phone].  [facility], EMERGENCY ROOM PHONE NUMBER: [phone]  IF A COMPLICATION OR EMERGENCY SITUATION ARISES AND YOU ARE UNABLE TO REACH   YOUR PHYSICIAN - GO DIRECTLY TO THE EMERGENCY ROOM.  Juan Coffey MD  6/2/2022 9:07:23 AM  This report has been verified and signed electronically.  Dear patient,  As a result of recent federal legislation (The Federal Cures Act), you may   receive lab or pathology results from your procedure in your [facility]   account before your physician is able to contact you. Your physician or   their representative will relay the results to you with their   recommendations at their soonest availability.  Thank you,  PROVATION

## 2022-06-02 NOTE — H&P
Short Stay Endoscopy History and Physical    PCP - Tiffany Mina MD    Procedure - EGD/Colonoscopy  ASA - per anesthesia  Mallampati - per anesthesia  History of Anesthesia problems - no  Family history Anesthesia problems -  no   Plan of anesthesia - MAC    HPI:  This is a 56 y.o. female here for evaluation of GERD, dyspepsia, and history of colon polyps.      ROS:  Constitutional: No fevers, chills  CV: No chest pain  Pulm: No cough, No shortness of breath  Ophtho: No vision changes  GI: see HPI    Medical History:  has a past medical history of Anticoagulant long-term use, Anxiety, Asthma, Atrial fibrillation, Brain anoxic injury, Cervicalgia (8/28/2014), CHI (closed head injury) (2/19/2013), Convulsion (5/30/2015), Decreased ROM of left shoulder (4/12/2017), Defibrillator activation (2013), Depression, Heart block, History of sudden cardiac arrest (2/2013), psychiatric care, Hypertension, Left atrial enlargement (4/11/2018), Pacemaker (2013), Paresthesia (11/1/2013), Prolonged Q-T interval on ECG (2/8/2013), Psychiatric problem, Seizures, Sleep difficulties, Stroke, Therapy, Thyroid disease, and Upper airway resistance syndrome (2/21/2017).    Surgical History:  has a past surgical history that includes Cardiac defibrillator placement; Hiatal hernia repair; breast reduction; Tubal ligation; Cardiac defibrillator placement; Hernia repair; Carpal tunnel release (Right); Insert / replace / remove pacemaker (10/2017); Total Reduction Mammoplasty; Colonoscopy (N/A, 5/7/2019); Trigger finger release (Left, 8/2/2019); Trigger finger release (Right, 2/11/2020); Revision of implantable cardioverter-defibrillator (ICD) electrode lead placement (Left, 12/7/2020); Revision of skin pocket for cardioverter-defibrillator (12/7/2020); Transesophageal echocardiography (N/A, 12/7/2020); and Transesophageal echocardiography (N/A, 12/7/2020).    Family History: family history includes COPD in her unknown relative; Glaucoma in her  maternal grandmother and paternal grandmother; Heart failure in her unknown relative; Hypertension in her mother.. Otherwise no colon cancer, inflammatory bowel disease, or GI malignancies.    Social History:  reports that she has never smoked. She has never used smokeless tobacco. She reports that she does not drink alcohol and does not use drugs.    Review of patient's allergies indicates:   Allergen Reactions    Aspirin Hives    Imitrex [sumatriptan] Palpitations    Penicillins Hives and Swelling    Shellfish containing products Anaphylaxis     seafood    Reglan [metoclopramide hcl] Other (See Comments)     Parkinsonism        Medications:   Facility-Administered Medications Prior to Admission   Medication Dose Route Frequency Provider Last Rate Last Admin    ketorolac injection 60 mg  60 mg Intramuscular 1 time in Clinic/HOD David Cortez MD        ketorolac injection 60 mg  60 mg Intramuscular 1 time in Clinic/HOD David Cortez MD        onabotulinumtoxina injection 200 Units  200 Units Intramuscular Q90 Days David Cortez MD   200 Units at 10/19/18 1713    onabotulinumtoxina injection 200 Units  200 Units Intramuscular Q90 Days David Cortez MD   200 Units at 12/28/18 1106    onabotulinumtoxina injection 200 Units  200 Units Intramuscular Q90 Days David Cortez MD   200 Units at 03/13/19 1504    onabotulinumtoxina injection 200 Units  200 Units Intramuscular Q90 Days David Cortez MD   200 Units at 05/23/19 1123    onabotulinumtoxina injection 200 Units  200 Units Intramuscular Q90 Days David Cortez MD   200 Units at 10/11/19 1112    onabotulinumtoxina injection 200 Units  200 Units Intramuscular Q90 Days David Cortez MD   200 Units at 12/19/19 1036    onabotulinumtoxina injection 200 Units  200 Units Intramuscular Q10 weeks David Cortez MD   200 Units at 03/10/20 1036    onabotulinumtoxina injection 200 Units  200 Units Intramuscular Q90 Days David Cortez MD   200 Units at  06/26/20 1538    onabotulinumtoxina injection 200 Units  200 Units Intramuscular q12 weeks David Cortez MD   200 Units at 05/19/22 1002     Medications Prior to Admission   Medication Sig Dispense Refill Last Dose    acetaminophen (TYLENOL) 325 MG tablet Take 2 tablets (650 mg total) by mouth every 6 (six) hours as needed for Pain. 30 tablet 0 Past Month at Unknown time    amLODIPine (NORVASC) 5 MG tablet Take 1 tablet (5 mg total) by mouth once daily. 90 each 3 6/2/2022 at Unknown time    ARIPiprazole (ABILIFY) 15 MG Tab Take 1 tablet (15 mg total) by mouth once daily. 90 tablet 1 Past Week at Unknown time    carvediloL (COREG) 25 MG tablet TAKE 1 TABLET(25 MG) BY MOUTH TWICE DAILY WITH MEALS 60 tablet 11 6/2/2022 at Unknown time    clonazePAM (KLONOPIN) 1 MG tablet Take 1 tablet (1 mg total) by mouth daily as needed for Anxiety. 30 tablet 5 6/2/2022 at Unknown time    cyanocobalamin, vitamin B-12, 1,000 mcg Subl Place 1,000 mcg under the tongue once daily. 90 tablet 3 6/1/2022 at Unknown time    donepezil (ARICEPT) 10 MG tablet Take 1 tablet (10 mg total) by mouth 2 (two) times daily. 180 tablet 3 Past Week at Unknown time    fremanezumab-vfrm (AJOVY) 225 mg/1.5 mL injection Inject 1.5 mLs (225 mg total) into the skin every 28 days. 1 each 12 Past Month at Unknown time    furosemide (LASIX) 20 MG tablet Take 1 tablet (20 mg total) by mouth once daily. 30 tablet 6 6/1/2022 at Unknown time    gabapentin (NEURONTIN) 300 MG capsule Take 3 capsules (900 mg total) by mouth 3 (three) times daily. 810 capsule 12 6/2/2022 at Unknown time    losartan (COZAAR) 100 MG tablet Take 1 tablet (100 mg total) by mouth once daily. 90 tablet 3 6/2/2022 at Unknown time    magnesium 200 mg Tab Take 200 mg by mouth once daily. (Patient taking differently: Take 200 mg by mouth every other day.) 30 each 12 Past Month at Unknown time    omeprazole (PRILOSEC) 40 MG capsule Take 1 capsule (40 mg total) by mouth once daily.  Take 30 minutes before breakfast 90 capsule 6 6/2/2022 at Unknown time    ondansetron (ZOFRAN-ODT) 4 MG TbDL Take 1 tablet (4 mg total) by mouth every 12 (twelve) hours as needed (nausea). 40 tablet 12 Past Week at Unknown time    rosuvastatin (CRESTOR) 40 MG Tab Take 1 tablet (40 mg total) by mouth every evening. 90 tablet 3 6/2/2022 at Unknown time    sertraline (ZOLOFT) 100 MG tablet Take 1.5 tablets (150 mg total) by mouth once daily. 135 tablet 3 Past Week at Unknown time    suvorexant (BELSOMRA) 10 mg Tab Take 1 tablet by mouth nightly as needed (insomnia). 15 tablet 1 Past Week at Unknown time    tiaGABine (GABITRIL) 4 MG tablet Take 1 tablet (4 mg total) by mouth every evening. 30 tablet 12 Past Week at Unknown time    tiZANidine (ZANAFLEX) 4 MG tablet TAKE 1 TABLET(4 MG) BY MOUTH EVERY 6 HOURS AS NEEDED 90 tablet 12 6/1/2022 at Unknown time    traZODone (DESYREL) 100 MG tablet Take 1 tablet (100 mg total) by mouth every evening. 30 tablet 11 Past Month at Unknown time    ubrogepant (UBROGEPANT) 100 mg tablet Take 1 tablet (100 mg total) by mouth 2 (two) times daily as needed for Migraine. 10 tablet 12 Past Week at Unknown time    zolpidem (AMBIEN) 10 mg Tab Take 1 tablet (10 mg total) by mouth nightly as needed (insomnia). 30 tablet 5 Past Week at Unknown time    blood sugar diagnostic Strp 1 strip by Misc.(Non-Drug; Combo Route) route 2 (two) times daily. 200 strip 3     blood-glucose meter kit Please provide with insurance covered meter 1 each 0     butalbital-acetaminophen-caffeine -40 mg (FIORICET, ESGIC) -40 mg per tablet Take 1 tablet by mouth every 4 (four) hours as needed for Pain. 8 tablet 0 Unknown at Unknown time    clopidogreL (PLAVIX) 75 mg tablet Take 1 tablet (75 mg total) by mouth once daily. 90 tablet 3 5/27/2022    diclofenac sodium (VOLTAREN) 1 % Gel Apply 2 g topically 4 (four) times daily. 100 g 1     EPINEPHrine (EPIPEN) 0.3 mg/0.3 mL AtIn Inject 0.3 mLs (0.3  mg total) into the muscle once. for 1 dose 0.3 mL 0     etodolac (LODINE) 500 MG tablet Take 1 tablet (500 mg total) by mouth 2 (two) times daily as needed (migraine). 30 tablet 12 More than a month at Unknown time    flurbiprofen (ANSAID) 100 MG tablet Take 1 tablet (100 mg total) by mouth 2 (two) times daily as needed (migraine). 12 tablet 12 More than a month at Unknown time    fluticasone propionate (FLONASE) 50 mcg/actuation nasal spray 1 spray (50 mcg total) by Each Nostril route once daily. 16 g 3     lancets Misc 1 Device by Misc.(Non-Drug; Combo Route) route 2 (two) times daily. 200 each 3     metFORMIN (GLUCOPHAGE) 500 MG tablet Take 1 tablet (500 mg total) by mouth 2 (two) times daily with meals. 180 tablet 3     mupirocin (BACTROBAN) 2 % ointment Apply to affected area 3 times daily 22 g 1     ondansetron (ZOFRAN-ODT) 4 MG TbDL Take 1 tablet (4 mg total) by mouth every 12 (twelve) hours as needed (nausea). 40 tablet 12     rivaroxaban (XARELTO) 20 mg Tab TAKE 1 TABLET BY MOUTH DAILY EVENING MEAL WITH DINNER 30 tablet 11 5/27/2022    silver sulfADIAZINE 1% (SILVADENE) 1 % cream Apply topically 2 (two) times daily. 50 g 0     tiZANidine (ZANAFLEX) 4 MG tablet Take 1 tablet (4 mg total) by mouth every 6 (six) hours as needed. 90 tablet 12     tiZANidine (ZANAFLEX) 4 MG tablet Take 1 tablet (4 mg total) by mouth every 6 (six) hours as needed. 90 tablet 12     TRUE METRIX GLUCOSE METER Misc TEST BG BID  0        Physical Exam:    Vital Signs:   Vitals:    06/02/22 0746   BP: (!) 149/81   Pulse: 75   Resp: 18   Temp: 98.4 °F (36.9 °C)       General Appearance: Well appearing in no acute distress  Eyes:    No scleral icterus  Lungs: CTA anteriorly  Heart:  Regular rate and rhythm  Abdomen: Soft, non tender, non distended with normal bowel sounds.    Labs:  Lab Results   Component Value Date    WBC 4.21 05/17/2022    HGB 11.7 (L) 05/17/2022    HCT 27 (L) 05/17/2022    MCV 90 05/17/2022      05/17/2022        BMP  Lab Results   Component Value Date     (L) 05/17/2022    K 4.7 05/17/2022     05/17/2022    CO2 21 (L) 05/17/2022    BUN 8 05/17/2022    CREATININE 0.8 05/17/2022    CALCIUM 9.0 05/17/2022    ANIONGAP 6 (L) 05/17/2022    ESTGFRAFRICA >60.0 05/17/2022    EGFRNONAA >60.0 05/17/2022     Lab Results   Component Value Date    INR 1.3 (H) 05/17/2022    INR 1.0 11/12/2020    INR 0.9 09/14/2020          Assessment:  56 y.o. female with GERD, dyspepsia, and history of colon polyps.     Plan:  Proceed with EGD and colonoscopy today.  I have explained the risks and benefits of endoscopy procedures to the patient including but not limited to bleeding, perforation, infection, and death.  All questions answered.        Juan Coffey MD

## 2022-06-02 NOTE — [employer] PATIENT INSTRUCTIONS
Discharge Summary/Instructions after an Endoscopic Procedure  Patient Name: Amna Chawla  Patient MRN: [id number]  Patient YOB: 1965 Thursday, June 2, 2022  Juan Coffey MD  Dear patient,  As a result of recent federal legislation (The Federal Cures Act), you may   receive lab or pathology results from your procedure in your [facility]   account before your physician is able to contact you. Your physician or   their representative will relay the results to you with their   recommendations at their soonest availability.  Thank you,  RESTRICTIONS:  During your procedure today, you received medications for sedation.  These   medications may affect your judgment, balance and coordination.  Therefore,   for 24 hours, you have the following restrictions:   - DO NOT drive a car, operate machinery, make legal/financial decisions,   sign important papers or drink alcohol.    ACTIVITY:  Today: no heavy lifting, straining or running due to procedural   sedation/anesthesia.  The following day: return to full activity including work.  DIET:  Eat and drink normally unless instructed otherwise.     TREATMENT FOR COMMON SIDE EFFECTS:  - Mild abdominal pain, nausea, belching, bloating or excessive gas:  rest,   eat lightly and use a heating pad.  - Sore Throat: treat with throat lozenges and/or gargle with warm salt   water.  - Because air was used during the procedure, expelling large amounts of air   from your rectum or belching is normal.  - If a bowel prep was taken, you may not have a bowel movement for 1-3 days.    This is normal.  SYMPTOMS TO WATCH FOR AND REPORT TO YOUR PHYSICIAN:  1. Abdominal pain or bloating, other than gas cramps.  2. Chest pain.  3. Back pain.  4. Signs of infection such as: chills or fever occurring within 24 hours   after the procedure.  5. Rectal bleeding, which would show as bright red, maroon, or black stools.   (A tablespoon of blood from the rectum is not serious, especially if    hemorrhoids are present.)  6. Vomiting.  7. Weakness or dizziness.  GO DIRECTLY TO THE NEAREST EMERGENCY ROOM IF YOU HAVE ANY OF THE FOLLOWING:      Difficulty breathing              Chills and/or fever over 101 F   Persistent vomiting and/or vomiting blood   Severe abdominal pain   Severe chest pain   Black, tarry stools   Bleeding- more than one tablespoon   Any other symptom or condition that you feel may need urgent attention  Your doctor recommends these additional instructions:  If any biopsies were taken, your doctors clinic will contact you in 1 to 2   weeks with any results.  - Discharge patient to home.   - Resume previous diet.   - Resume Plavix (clopidogrel) tomorrow and Xarelto (rivaroxaban) tomorrow at   prior doses.   - Repeat colonoscopy in 5 years for surveillance.   - The findings and recommendations were discussed with the patient.   - Patient has a contact number available for emergencies.  The signs and   symptoms of potential delayed complications were discussed with the   patient.  Return to normal activities tomorrow.  Written discharge   instructions were provided to the patient.  For questions, problems or results please call your physician - Juan Coffey MD at Work:  [phone].  [facility] [place] [facility], EMERGENCY ROOM PHONE NUMBER: [phone]  IF A COMPLICATION OR EMERGENCY SITUATION ARISES AND YOU ARE UNABLE TO REACH   YOUR PHYSICIAN - GO DIRECTLY TO THE EMERGENCY ROOM.  Juan Coffey MD  6/2/2022 9:09:07 AM  This report has been verified and signed electronically.  Dear patient,  As a result of recent federal legislation (The Federal Cures Act), you may   receive lab or pathology results from your procedure in your [facility]   account before your physician is able to contact you. Your physician or   their representative will relay the results to you with their   recommendations at their soonest availability.  Thank you,  [employer]ATION

## 2022-06-02 NOTE — ANESTHESIA PREPROCEDURE EVALUATION
06/02/2022  Amna Chawla is a 56 y.o., female EGD / Colonoscopy for GERD.      Patient Active Problem List   Diagnosis    Complete AV block    Pacemaker    Mixed hyperlipidemia    History of CVA (cerebrovascular accident)    Knee pain    Hiatal hernia    GERD (gastroesophageal reflux disease)    Occipital neuralgia    Chronic migraine without aura, with intractable migraine, so stated, with status migrainosus    History of sudden cardiac arrest    Biventricular automatic implantable cardioverter defibrillator in situ    Left-sided headache    Essential hypertension    Supraorbital neuralgia    Anxiety    Keratoconjunctivitis sicca of both eyes not specified as Sjogren's    Right carpal tunnel syndrome    Impaired mobility and ADLs    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    Headaches due to old head injury    Decreased ROM of neck    Bilateral carpal tunnel syndrome    Paroxysmal atrial fibrillation    Long term (current) use of anticoagulants    Impaired functional mobility, balance, gait, and endurance    Obstructive sleep apnea    Muscle weakness of lower extremity    Nonischemic cardiomyopathy from RV pacing, resolved with upgrade cardiac resynchronization therapy    History of DVT (deep vein thrombosis)    Depression, major, recurrent, moderate    Insomnia    Left atrial enlargement    History of TIA (transient ischemic attack)    Thyroid cyst    Hemispheric carotid artery syndrome    Thyroid nodule    Anemia due to vitamin B12 deficiency    Vitamin D deficiency    Trigger middle finger of left hand    Prediabetes    B12 deficiency    Acquired trigger finger of right middle finger    Leg edema    Non-compliance    Chronic fatigue    Acute pain of left knee    Decreased range of motion of left knee    Headache    Mild neurocognitive  disorder due to another medical condition    Epistaxis    Morbid obesity    Non-cardiac chest pain    Grief reaction     .    Pre-op Assessment    I have reviewed the Patient Summary Reports.          Review of Systems  Anesthesia Hx:  No problems with previous Anesthesia    Social:  Non-Smoker    Hematology/Oncology:  Hematology Normal   Oncology Normal     EENT/Dental:EENT/Dental Normal   Cardiovascular:   Pacemaker Hypertension Negative PET stress in Feb '22.   Pulmonary:   Asthma Sleep Apnea    Renal/:  Renal/ Normal     Hepatic/GI:   Hiatal Hernia, GERD    Musculoskeletal:  Musculoskeletal Normal    Neurological:   CVA Headaches Seizures    Endocrine:  Endocrine Normal    Dermatological:  Skin Normal    Psych:  Psychiatric Normal           Physical Exam  General:  Alert and Oriented      Airway/Jaw/Neck:  Airway Findings: Mouth Opening: Normal   Tongue: Normal   General Airway Assessment: Adult Mallampati: II  Improves to II with phonation.  TM Distance: Normal, at least 6 cm   Neck ROM: Normal ROM       Dental:  Intact  No active dental problems.    Chest/Lungs:  Chest/Lungs Findings: Clear to auscultation      Heart/Vascular:  Heart Findings: Rate: Normal  Rhythm: Regular Rhythm  Sounds: Normal        Mental Status:  Mental Status Findings:  Cooperative, Alert and Oriented         Anesthesia Plan  Type of Anesthesia, risks & benefits discussed:  Anesthesia Type:  general    Patient's Preference:   Plan Factors:          Intra-op Monitoring Plan: standard ASA monitors and arterial line  Intra-op Monitoring Plan Comments:   Post Op Pain Control Plan: multimodal analgesia  Post Op Pain Control Plan Comments:     Induction:   IV  Beta Blocker:         Informed Consent: Informed consent signed with the Patient and all parties understand the risks and agree with anesthesia plan.  All questions answered.  Anesthesia consent signed with patient.  ASA Score: 3     Day of Surgery Review of History & Physical:         Anesthesia Plan Notes:          Ready For Surgery From Anesthesia Perspective.           Physical Exam  General: Alert and Oriented    Airway:  Mallampati: II / II  Mouth Opening: Normal  TM Distance: Normal, at least 6 cm  Tongue: Normal  Neck ROM: Normal ROM    Dental:  Intact    Chest/Lungs:  Clear to auscultation    Heart:  Rate: Normal  Rhythm: Regular Rhythm  Sounds: Normal          Anesthesia Plan  Type of Anesthesia, risks & benefits discussed:    Anesthesia Type: general  Intra-op Monitoring Plan: standard ASA monitors and arterial line  Post Op Pain Control Plan: multimodal analgesia  Induction:  IV  Airway Plan: Direct  Informed Consent: Informed consent signed with the Patient and all parties understand the risks and agree with anesthesia plan.  All questions answered.   ASA Score: 3  Anesthesia Plan Notes:      Ready For Surgery From Anesthesia Perspective.       .

## 2022-06-03 NOTE — ANESTHESIA POSTPROCEDURE EVALUATION
Anesthesia Post Evaluation    Patient: Amna Chawla    Procedure(s) Performed: Procedure(s) (LRB):  EGD (ESOPHAGOGASTRODUODENOSCOPY) (N/A)  COLONOSCOPY (N/A)    Final Anesthesia Type: general      Patient location during evaluation: PACU  Patient participation: Yes- Able to Participate  Level of consciousness: awake and alert  Post-procedure vital signs: reviewed and stable  Pain control: Pain has been treated.  Airway patency: patent    PONV status: PONV absent or treated.  Anesthetic complications: no      Cardiovascular status: hemodynamically stable  Respiratory status: spontaneous ventilation  Hydration status: euvolemic  Follow-up not needed.          Vitals Value Taken Time   /78 06/02/22 0935   Temp 36.7 °C (98 °F) 06/02/22 0935   Pulse 64 06/02/22 0938   Resp 21 06/02/22 0938   SpO2 100 % 06/02/22 0938   Vitals shown include unvalidated device data.      No case tracking events are documented in the log.      Pain/Kofi Score: Kofi Score: 10 (6/2/2022  9:20 AM)

## 2022-06-09 DIAGNOSIS — I44.2 COMPLETE AV BLOCK: Primary | ICD-10-CM

## 2022-06-09 DIAGNOSIS — Z95.810 BIVENTRICULAR AUTOMATIC IMPLANTABLE CARDIOVERTER DEFIBRILLATOR IN SITU: ICD-10-CM

## 2022-06-10 ENCOUNTER — TELEPHONE (OUTPATIENT)
Dept: ADMINISTRATIVE | Facility: CLINIC | Age: 57
End: 2022-06-10
Payer: MEDICARE

## 2022-06-10 ENCOUNTER — PATIENT MESSAGE (OUTPATIENT)
Dept: INTERNAL MEDICINE | Facility: CLINIC | Age: 57
End: 2022-06-10
Payer: MEDICARE

## 2022-06-10 LAB
FINAL PATHOLOGIC DIAGNOSIS: NORMAL
Lab: NORMAL

## 2022-06-10 NOTE — TELEPHONE ENCOUNTER
Called pt; informed pt I was calling to confirm her virtual EAWV on 6/13/22 at 10:00am and to see if she needed any help; pt stated she did not need any help and would complete e-pre check later today; pt informed to login 15 minutes prior to appt time

## 2022-06-13 ENCOUNTER — OFFICE VISIT (OUTPATIENT)
Dept: HOME HEALTH SERVICES | Facility: CLINIC | Age: 57
End: 2022-06-13
Payer: MEDICARE

## 2022-06-13 ENCOUNTER — TELEPHONE (OUTPATIENT)
Dept: ADMINISTRATIVE | Facility: CLINIC | Age: 57
End: 2022-06-13
Payer: MEDICARE

## 2022-06-13 DIAGNOSIS — F43.21 GRIEF REACTION: ICD-10-CM

## 2022-06-13 DIAGNOSIS — G47.00 INSOMNIA, UNSPECIFIED TYPE: ICD-10-CM

## 2022-06-13 DIAGNOSIS — Z86.73 HISTORY OF CVA (CEREBROVASCULAR ACCIDENT): ICD-10-CM

## 2022-06-13 DIAGNOSIS — K21.9 GASTROESOPHAGEAL REFLUX DISEASE WITHOUT ESOPHAGITIS: ICD-10-CM

## 2022-06-13 DIAGNOSIS — I10 ESSENTIAL HYPERTENSION: ICD-10-CM

## 2022-06-13 DIAGNOSIS — F41.9 ANXIETY: ICD-10-CM

## 2022-06-13 DIAGNOSIS — G43.711 CHRONIC MIGRAINE WITHOUT AURA, WITH INTRACTABLE MIGRAINE, SO STATED, WITH STATUS MIGRAINOSUS: ICD-10-CM

## 2022-06-13 DIAGNOSIS — Z79.01 LONG TERM (CURRENT) USE OF ANTICOAGULANTS: ICD-10-CM

## 2022-06-13 DIAGNOSIS — I48.0 PAROXYSMAL ATRIAL FIBRILLATION: ICD-10-CM

## 2022-06-13 DIAGNOSIS — Z95.810 BIVENTRICULAR AUTOMATIC IMPLANTABLE CARDIOVERTER DEFIBRILLATOR IN SITU: ICD-10-CM

## 2022-06-13 DIAGNOSIS — F33.1 DEPRESSION, MAJOR, RECURRENT, MODERATE: ICD-10-CM

## 2022-06-13 DIAGNOSIS — R73.03 PREDIABETES: ICD-10-CM

## 2022-06-13 DIAGNOSIS — E66.01 MORBID OBESITY: ICD-10-CM

## 2022-06-13 DIAGNOSIS — S06.9X0S COGNITIVE DEFICIT AS LATE EFFECT OF TRAUMATIC BRAIN INJURY: ICD-10-CM

## 2022-06-13 DIAGNOSIS — F06.8 COGNITIVE DEFICIT AS LATE EFFECT OF TRAUMATIC BRAIN INJURY: ICD-10-CM

## 2022-06-13 DIAGNOSIS — Z00.00 ENCOUNTER FOR PREVENTIVE HEALTH EXAMINATION: Primary | ICD-10-CM

## 2022-06-13 DIAGNOSIS — G47.33 OBSTRUCTIVE SLEEP APNEA: ICD-10-CM

## 2022-06-13 DIAGNOSIS — E78.2 MIXED HYPERLIPIDEMIA: ICD-10-CM

## 2022-06-13 PROCEDURE — G0439 PPPS, SUBSEQ VISIT: HCPCS | Mod: 95,,, | Performed by: NURSE PRACTITIONER

## 2022-06-13 PROCEDURE — G0439 PR MEDICARE ANNUAL WELLNESS SUBSEQUENT VISIT: ICD-10-PCS | Mod: 95,,, | Performed by: NURSE PRACTITIONER

## 2022-06-13 NOTE — PROGRESS NOTES
"The patient location is: [place]  The chief complaint leading to consultation is: Health Risk Assessment    Visit type: audiovisual    Face to Face time with patient: 20  40 minutes of total time spent on the encounter, which includes face to face time and non-face to face time preparing to see the patient (eg, review of tests), Obtaining and/or reviewing separately obtained history, Documenting clinical information in the electronic or other health record, Independently interpreting results (not separately reported) and communicating results to the patient/family/caregiver, or Care coordination (not separately reported).         Each patient to whom he or she provides medical services by telemedicine is:  (1) informed of the relationship between the physician and patient and the respective role of any other health care provider with respect to management of the patient; and (2) notified that he or she may decline to receive medical services by telemedicine and may withdraw from such care at any time.    Notes:       Amna Chawla presented for a  Medicare AWV and comprehensive Health Risk Assessment today. The following components were reviewed and updated:    · Medical history  · Family History  · Social history  · Allergies and Current Medications  · Health Risk Assessment  · Health Maintenance  · Care Team         ** See Completed Assessments for Annual Wellness Visit within the encounter summary.**         The following assessments were completed:  · Living Situation  · CAGE  · Depression Screening  · Fall Risk Assessment (MACH 10)  · Hearing Assessment(HHI)  · Cognitive Function Screening  · Nutrition Screening  · ADL Screening  · PAQ Screening      Vitals:    06/13/22 0950   Weight: (!) 141.5 kg (312 lb)   Height: 5' 4" (1.626 m)     Body mass index is 53.55 kg/m².  Physical Exam  Constitutional:       Appearance: She is obese.   Neurological:      Mental Status: She is alert and oriented to person, place, " and time.               Diagnoses and health risks identified today and associated recommendations/orders:    1. Encounter for preventive health examination  Assessments completed. Preventive measures and health maintenance reviewed with patient.     2. Morbid obesity  Stable, followed by PCP. Healthy diet and tolerable exercise encouraged.     3. Depression, major, recurrent, moderate  Stable, followed by Psychiatry.    4. Paroxysmal atrial fibrillation  Stable, followed by Cardiology.    5. 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 Patient on Aricept.     6. History of CVA (cerebrovascular accident)  Stable, followed by PCP and Cardiology.    7. Chronic migraine without aura, with intractable migraine, so stated, with status migrainosus  Stable, followed by Neurology.    8. Anxiety  Stable, followed by Psychiatry.    9. Grief reaction  Stable, followed by Psychiatry.    10. Mixed hyperlipidemia  Stable, followed by PCP.    11. Essential hypertension  Stable, followed by PCP.    12. Biventricular automatic implantable cardioverter defibrillator in situ  Stable, followed by Cardiology.    13. Long term (current) use of anticoagulants  Stable, followed by Cardiology.    14. Prediabetes  Stable, followed by PCP.  Last A1C 6.4    15. Gastroesophageal reflux disease without esophagitis  Stable, followed by PCP.    16. Insomnia, unspecified type  Stable, followed by PCP.    17. Obstructive sleep apnea  Stable, followed by PCP.    Provided Amna with a 5-10 year written screening schedule and personal prevention plan. Recommendations were developed using the USPSTF age appropriate recommendations. Education, counseling, and referrals were provided as needed. After Visit Summary printed and given to patient which includes a list of additional screenings\tests needed.    Follow up in about 1 year (around 6/13/2023) for your next annual wellness visit.    IDA Archer  offered to discuss advanced care planning, including how to pick a person who would make decisions for you if you were unable to make them for yourself, called a health care power of , and what kind of decisions you might make such as use of life sustaining treatments such as ventilators and tube feeding when faced with a life limiting illness recorded on a living will that they will need to know. (How you want to be cared for as you near the end of your natural life)     X Patient is interested in learning more about how to make advanced directives.  I provided them paperwork and offered to discuss this with them.

## 2022-06-13 NOTE — PATIENT INSTRUCTIONS
Counseling and Referral of Other Preventative  (Italic type indicates deductible and co-insurance are waived)    Patient Name: Amna Chawla  Today's Date: 6/13/2022    Health Maintenance       Date Due Completion Date    COVID-19 Vaccine (4 - Booster for Moderna series) 05/10/2022 1/10/2022    Mammogram 12/02/2022 12/2/2021    Lipid Panel 05/17/2023 5/17/2022    High Dose Statin 06/02/2023 6/2/2022    Cervical Cancer Screening 09/10/2024 9/10/2019    Colorectal Cancer Screening 06/02/2027 6/2/2022    TETANUS VACCINE 10/02/2028 10/2/2018        No orders of the defined types were placed in this encounter.    The following information is provided to all patients.  This information is to help you find resources for any of the problems found today that may be affecting your health:                Living healthy guide: www.[facility].[place].[facility]      Understanding Diabetes: www.diabetes.org      Eating healthy: www.cdc.gov/healthyweight      [facility] home safety checklist: www.cdc.gov/steadi/patient.html      Agency on Aging: www.goea.[place].[facility]      Alcoholics anonymous (AA): www.aa.org      Physical Activity: www.johnnie.nih.gov/ir2ldwm      Tobacco use: www.quitwithusla.org

## 2022-06-13 NOTE — PROGRESS NOTES
PCP - Tiffany Mina MD    Subjective:   Patient ID:  Amna Chawla is a 56 y.o. y.o. female with cardiac arrest in  s/p ICD (negative CTA at that time), pAF, CVA, HTN, HLD, morbid obesity (BMI 54), SHIRA here for the follow up evaluation. She has seen Dr. Gaspar and Dr. Ceron in the past.     No exertional chest pain or heart failure symptoms. No palpitations or claudication. No MERCHANT, orthopnea, PND, edema, syncope. Never smoker and drinks alcohol seldomly. No family history of CAD or SCD. Father  at age 70. Most recent stressor was regarding her sister passing away unexpectedly due to MVA.      Seen in ER for hypertension causing headaches with BP >180/90 mmHg on 2022. Exacerbated after her sisters death. She is grieving and has emotional stress. She was also seen in the ER for right shoulder pain.    Has previously experienced chest pain and had a negative stress test. Does not follow have a routine exercise program. ADLs independently. Exercise tolerance is of >4 METS. No exertional angina.  She denies any chest symptoms. No heart failure, claudication or palpitations.     Here to follow up on HTN. On last clinic visit we added amlodipine to her regimen. Today her BP is under excellent controlled. She was enrolled in the hypertension program. Her readings are much better. Headaches have improved as well. No heart failure symptoms. No angina. No claudication or palpitations.     History:     Social History     Tobacco Use    Smoking status: Never Smoker    Smokeless tobacco: Never Used   Substance Use Topics    Alcohol use: Yes     Comment: wine, socially     Family History   Problem Relation Age of Onset    Hypertension Mother     Glaucoma Maternal Grandmother     Glaucoma Paternal Grandmother     COPD Unknown     Heart failure Unknown        Meds:     Review of patient's allergies indicates:   Allergen Reactions    Aspirin Hives    Imitrex [sumatriptan] Palpitations    Penicillins  Hives and Swelling    Shellfish containing products Anaphylaxis     seafood    Reglan [metoclopramide hcl] Other (See Comments)     Parkinsonism        Current Outpatient Medications:     acetaminophen (TYLENOL) 325 MG tablet, Take 2 tablets (650 mg total) by mouth every 6 (six) hours as needed for Pain., Disp: 30 tablet, Rfl: 0    amLODIPine (NORVASC) 5 MG tablet, Take 1 tablet (5 mg total) by mouth once daily., Disp: 90 each, Rfl: 3    ARIPiprazole (ABILIFY) 15 MG Tab, Take 1 tablet (15 mg total) by mouth once daily., Disp: 90 tablet, Rfl: 1    blood sugar diagnostic Strp, 1 strip by Misc.(Non-Drug; Combo Route) route 2 (two) times daily., Disp: 200 strip, Rfl: 3    blood-glucose meter kit, Please provide with insurance covered meter, Disp: 1 each, Rfl: 0    butalbital-acetaminophen-caffeine -40 mg (FIORICET, ESGIC) -40 mg per tablet, Take 1 tablet by mouth every 4 (four) hours as needed for Pain., Disp: 8 tablet, Rfl: 0    carvediloL (COREG) 25 MG tablet, TAKE 1 TABLET(25 MG) BY MOUTH TWICE DAILY WITH MEALS, Disp: 60 tablet, Rfl: 11    clonazePAM (KLONOPIN) 1 MG tablet, Take 1 tablet (1 mg total) by mouth daily as needed for Anxiety., Disp: 30 tablet, Rfl: 5    clopidogreL (PLAVIX) 75 mg tablet, Take 1 tablet (75 mg total) by mouth once daily., Disp: 90 tablet, Rfl: 3    cyanocobalamin, vitamin B-12, 1,000 mcg Subl, Place 1,000 mcg under the tongue once daily., Disp: 90 tablet, Rfl: 3    diclofenac sodium (VOLTAREN) 1 % Gel, Apply 2 g topically 4 (four) times daily., Disp: 100 g, Rfl: 1    donepezil (ARICEPT) 10 MG tablet, Take 1 tablet (10 mg total) by mouth 2 (two) times daily., Disp: 180 tablet, Rfl: 3    EPINEPHrine (EPIPEN) 0.3 mg/0.3 mL AtIn, Inject 0.3 mLs (0.3 mg total) into the muscle once. for 1 dose, Disp: 0.3 mL, Rfl: 0    etodolac (LODINE) 500 MG tablet, Take 1 tablet (500 mg total) by mouth 2 (two) times daily as needed (migraine)., Disp: 30 tablet, Rfl: 12     flurbiprofen (ANSAID) 100 MG tablet, Take 1 tablet (100 mg total) by mouth 2 (two) times daily as needed (migraine)., Disp: 12 tablet, Rfl: 12    fremanezumab-vfrm (AJOVY) 225 mg/1.5 mL injection, Inject 1.5 mLs (225 mg total) into the skin every 28 days., Disp: 1 each, Rfl: 12    furosemide (LASIX) 20 MG tablet, Take 1 tablet (20 mg total) by mouth once daily., Disp: 30 tablet, Rfl: 6    gabapentin (NEURONTIN) 300 MG capsule, Take 3 capsules (900 mg total) by mouth 3 (three) times daily., Disp: 810 capsule, Rfl: 12    lancets Misc, 1 Device by Misc.(Non-Drug; Combo Route) route 2 (two) times daily., Disp: 200 each, Rfl: 3    losartan (COZAAR) 100 MG tablet, Take 1 tablet (100 mg total) by mouth once daily., Disp: 90 tablet, Rfl: 3    magnesium 200 mg Tab, Take 200 mg by mouth once daily. (Patient taking differently: Take 200 mg by mouth every other day.), Disp: 30 each, Rfl: 12    mupirocin (BACTROBAN) 2 % ointment, Apply to affected area 3 times daily, Disp: 22 g, Rfl: 1    omeprazole (PRILOSEC) 40 MG capsule, Take 1 capsule (40 mg total) by mouth once daily. Take 30 minutes before breakfast, Disp: 90 capsule, Rfl: 6    ondansetron (ZOFRAN-ODT) 4 MG TbDL, Take 1 tablet (4 mg total) by mouth every 12 (twelve) hours as needed (nausea)., Disp: 40 tablet, Rfl: 12    ondansetron (ZOFRAN-ODT) 4 MG TbDL, Take 1 tablet (4 mg total) by mouth every 12 (twelve) hours as needed (nausea)., Disp: 40 tablet, Rfl: 12    rivaroxaban (XARELTO) 20 mg Tab, TAKE 1 TABLET BY MOUTH DAILY EVENING MEAL WITH DINNER, Disp: 30 tablet, Rfl: 11    rosuvastatin (CRESTOR) 40 MG Tab, Take 1 tablet (40 mg total) by mouth every evening., Disp: 90 tablet, Rfl: 3    sertraline (ZOLOFT) 100 MG tablet, Take 1.5 tablets (150 mg total) by mouth once daily., Disp: 135 tablet, Rfl: 3    silver sulfADIAZINE 1% (SILVADENE) 1 % cream, Apply topically 2 (two) times daily., Disp: 50 g, Rfl: 0    suvorexant (BELSOMRA) 10 mg Tab, Take 1 tablet by  mouth nightly as needed (insomnia)., Disp: 15 tablet, Rfl: 1    tiaGABine (GABITRIL) 4 MG tablet, Take 1 tablet (4 mg total) by mouth every evening., Disp: 30 tablet, Rfl: 12    tiZANidine (ZANAFLEX) 4 MG tablet, TAKE 1 TABLET(4 MG) BY MOUTH EVERY 6 HOURS AS NEEDED, Disp: 90 tablet, Rfl: 12    tiZANidine (ZANAFLEX) 4 MG tablet, Take 1 tablet (4 mg total) by mouth every 6 (six) hours as needed., Disp: 90 tablet, Rfl: 12    tiZANidine (ZANAFLEX) 4 MG tablet, Take 1 tablet (4 mg total) by mouth every 6 (six) hours as needed., Disp: 90 tablet, Rfl: 12    tiZANidine (ZANAFLEX) 4 MG tablet, TAKE 1 TABLET(4 MG) BY MOUTH EVERY 6 HOURS AS NEEDED, Disp: 90 tablet, Rfl: 12    traZODone (DESYREL) 100 MG tablet, Take 1 tablet (100 mg total) by mouth every evening., Disp: 30 tablet, Rfl: 11    TRUE METRIX GLUCOSE METER Misc, TEST BG BID, Disp: , Rfl: 0    ubrogepant (UBROGEPANT) 100 mg tablet, Take 1 tablet (100 mg total) by mouth 2 (two) times daily as needed for Migraine., Disp: 10 tablet, Rfl: 12    zolpidem (AMBIEN) 10 mg Tab, Take 1 tablet (10 mg total) by mouth nightly as needed (insomnia)., Disp: 30 tablet, Rfl: 5    Current Facility-Administered Medications:     ketorolac injection 60 mg, 60 mg, Intramuscular, 1 time in Clinic/HOD, David Cortez MD    ketorolac injection 60 mg, 60 mg, Intramuscular, 1 time in Clinic/HOD, David Cortez MD    onabotulinumtoxina injection 200 Units, 200 Units, Intramuscular, Q90 Days, David Cortez MD, 200 Units at 10/19/18 1713    onabotulinumtoxina injection 200 Units, 200 Units, Intramuscular, Q90 Days, David Cortez MD, 200 Units at 12/28/18 1106    onabotulinumtoxina injection 200 Units, 200 Units, Intramuscular, Q90 Days, David Cortez MD, 200 Units at 03/13/19 1504    onabotulinumtoxina injection 200 Units, 200 Units, Intramuscular, Q90 Days, David Cortez MD, 200 Units at 05/23/19 1123    onabotulinumtoxina injection 200 Units, 200 Units, Intramuscular, Q90  "Days, David Cortez MD, 200 Units at 10/11/19 1112    onabotulinumtoxina injection 200 Units, 200 Units, Intramuscular, Q90 Days, David Cortez MD, 200 Units at 12/19/19 1036    onabotulinumtoxina injection 200 Units, 200 Units, Intramuscular, Q10 weeks, David Cortez MD, 200 Units at 03/10/20 1036    onabotulinumtoxina injection 200 Units, 200 Units, Intramuscular, Q90 Days, David Cortez MD, 200 Units at 06/26/20 1538    onabotulinumtoxina injection 200 Units, 200 Units, Intramuscular, q12 weeks, David Cortez MD, 200 Units at 05/19/22 1002    Facility-Administered Medications Ordered in Other Visits:     lactated ringers infusion, , Intravenous, Continuous, Humberto Angel MD, Stopped at 02/11/20 0801    lidocaine (PF) 10 mg/ml (1%) injection 5 mg, 0.5 mL, Intradermal, Once, Humberto Angel MD    Review of Systems   Constitutional: Negative for chills, fever, malaise/fatigue and weight loss.   HENT: Negative for ear pain, hearing loss and tinnitus.    Respiratory: Negative for cough, hemoptysis, sputum production and shortness of breath.    Cardiovascular: Negative for chest pain, palpitations, orthopnea, claudication, leg swelling and PND.   Gastrointestinal: Negative for abdominal pain, diarrhea, heartburn, nausea and vomiting.   Genitourinary: Negative for dysuria and urgency.   Musculoskeletal: Negative for back pain, myalgias and neck pain.   Neurological: Negative for dizziness and headaches.   Psychiatric/Behavioral: Negative for depression.     Objective:   /61 (BP Location: Left arm, Patient Position: Sitting, BP Method: Medium (Automatic))   Pulse 70   Ht 5' 4" (1.626 m)   Wt (!) 142 kg (313 lb 0.9 oz)   LMP 02/04/2016 (Approximate)   BMI 53.74 kg/m²   Physical Exam  Constitutional:       Appearance: Normal appearance.   Cardiovascular:      Rate and Rhythm: Normal rate and regular rhythm.      Pulses: Normal pulses.           Carotid pulses are 2+ on the right side and 2+ on the " left side.       Radial pulses are 2+ on the right side and 2+ on the left side.        Femoral pulses are 2+ on the right side and 2+ on the left side.       Popliteal pulses are 2+ on the right side and 2+ on the left side.        Dorsalis pedis pulses are 2+ on the right side and 2+ on the left side.        Posterior tibial pulses are 2+ on the right side and 2+ on the left side.      Heart sounds: No murmur heard.  Pulmonary:      Effort: Pulmonary effort is normal. No respiratory distress.      Breath sounds: No stridor. No wheezing.   Abdominal:      General: Abdomen is flat. Bowel sounds are normal. There is no distension.      Palpations: Abdomen is soft.   Musculoskeletal:         General: No swelling. Normal range of motion.   Skin:     General: Skin is warm and dry.      Capillary Refill: Capillary refill takes less than 2 seconds.   Neurological:      General: No focal deficit present.      Mental Status: She is alert and oriented to person, place, and time.       Labs:     Lab Results   Component Value Date     (L) 05/17/2022    K 4.7 05/17/2022     05/17/2022    CO2 21 (L) 05/17/2022    BUN 8 05/17/2022    CREATININE 0.8 05/17/2022    ANIONGAP 6 (L) 05/17/2022     Lab Results   Component Value Date    HGBA1C 6.4 (H) 04/12/2022     Lab Results   Component Value Date    BNP <10 12/10/2021    BNP 17 11/01/2020    BNP 11 02/02/2014       Lab Results   Component Value Date    WBC 4.21 05/17/2022    HGB 11.7 (L) 05/17/2022    HCT 27 (L) 05/17/2022    HCT 35.7 (L) 05/17/2022     05/17/2022    GRAN 2.2 05/17/2022    GRAN 53.2 05/17/2022     Lab Results   Component Value Date    CHOL 126 05/17/2022    HDL 38 (L) 05/17/2022    LDLCALC 73.0 05/17/2022    TRIG 75 05/17/2022       Lab Results   Component Value Date     (L) 05/17/2022    K 4.7 05/17/2022     05/17/2022    CO2 21 (L) 05/17/2022    BUN 8 05/17/2022    CREATININE 0.8 05/17/2022    ANIONGAP 6 (L) 05/17/2022     Lab Results    Component Value Date    HGBA1C 6.4 (H) 04/12/2022     Lab Results   Component Value Date    BNP <10 12/10/2021    BNP 17 11/01/2020    BNP 11 02/02/2014    Lab Results   Component Value Date    WBC 4.21 05/17/2022    HGB 11.7 (L) 05/17/2022    HCT 27 (L) 05/17/2022    HCT 35.7 (L) 05/17/2022     05/17/2022    GRAN 2.2 05/17/2022    GRAN 53.2 05/17/2022     Lab Results   Component Value Date    CHOL 126 05/17/2022    HDL 38 (L) 05/17/2022    LDLCALC 73.0 05/17/2022    TRIG 75 05/17/2022                Cardiovascular Imaging:     Echo:   EF   Date Value Ref Range Status   12/27/2021 58 % Final     Nuc Stress EF   Date Value Ref Range Status   02/08/2022 55 % Final     Nuc Rest EF   Date Value Ref Range Status   02/08/2022 53  Final       PET stress test 2022:     The relative PET images show no clinically significant regional resting or stress induced perfusion abnormalities.    The whole heart absolute myocardial perfusion values averaged 1.02 cc/min/g at rest, which is elevated; 2.21 cc/min/g at stress, which is normal; and CFR is 2.19 , which is mildly reduced in part due to elevated resting flow.    CT attenuation images demonstrate no coronary calcifications and no aortic calcifications.    The gated perfusion images showed an ejection fraction of 53% at rest and 55% during stress. A normal ejection fraction is greater than 53%.    The wall motion is normal at rest and during stress.    The LV cavity size is normal at rest and stress.    The EKG portion of this study is uninterpretable.    There were no arrhythmias during stress.    The patient reported no chest pain during the stress test.    When compared to the previous study from 9/15/2020, myocardial stress flows and CFR are much improved on the current study and similar to her study in 2017.    Assessment & Plan:     1. Essential hypertension    2. Biventricular automatic implantable cardioverter defibrillator in situ    3. Mixed  hyperlipidemia    4. Complete AV block    5. Pacemaker    6. Nonischemic cardiomyopathy from RV pacing, resolved with upgrade cardiac resynchronization therapy    7. Long term (current) use of anticoagulants    8. Paroxysmal atrial fibrillation      HTN - Controlled  Had a prior ER visit for symptomatic HTN  Enrolled in digital hypertension program   Continue Losartan, Coreg and Amlodipine     Non-cardiac chest pain   Secondary to shoulder pain. She was referred for steroid injections.   Has a negative PET stress test 5/2022.   No exertional symptoms.   Encouraged to increase activities by walking.    PAF  Continue Carvedilol, Rivaroxaban  Her ICD is operating normally per last interrogation.  Managed by EP     Grief - better   Continue with healthy lifestyle habits.   Seeing a psychiatrics and will see a grief specialist.    HLD  LDL 73 (2022)      SHIRA  CPAP recalled. Referral already sent to sleep medicine who recommended to continue using her CPAP until [facility] contacts her for device exchange. She is still waiting for them to send her a new machine.     Morbid obesity  We discussed diet and lifestyle modification   She watches her diet. LDL near goal on medications  She wants to control her wt but is difficult.  She is being referred to the bariatric medicine team    History of CVA  Continue Plavix per neurology (can be held for EGD)  Renewed prescription    RTC 3 mo    Signed:  Alok Grossman M.D.  Cardiovascular Fellow  [facility]

## 2022-06-13 NOTE — TELEPHONE ENCOUNTER
Called pt; informed pt I was just making a reminder call for her virtual visit today at 10:00am and to see if she needed any help; pt stated she didn't need any help; pt informed to login 15 minutes prior to appt time

## 2022-06-14 ENCOUNTER — TELEPHONE (OUTPATIENT)
Dept: BARIATRICS | Facility: CLINIC | Age: 57
End: 2022-06-14
Payer: MEDICARE

## 2022-06-14 ENCOUNTER — OFFICE VISIT (OUTPATIENT)
Dept: CARDIOLOGY | Facility: CLINIC | Age: 57
End: 2022-06-14
Payer: MEDICARE

## 2022-06-14 VITALS
WEIGHT: 293 LBS | HEART RATE: 70 BPM | DIASTOLIC BLOOD PRESSURE: 61 MMHG | SYSTOLIC BLOOD PRESSURE: 128 MMHG | BODY MASS INDEX: 50.02 KG/M2 | HEIGHT: 64 IN

## 2022-06-14 VITALS — WEIGHT: 293 LBS | HEIGHT: 64 IN | BODY MASS INDEX: 50.02 KG/M2

## 2022-06-14 DIAGNOSIS — I44.2 COMPLETE AV BLOCK: ICD-10-CM

## 2022-06-14 DIAGNOSIS — Z95.0 PACEMAKER: ICD-10-CM

## 2022-06-14 DIAGNOSIS — R73.03 PREDIABETES: ICD-10-CM

## 2022-06-14 DIAGNOSIS — Z79.01 LONG TERM (CURRENT) USE OF ANTICOAGULANTS: ICD-10-CM

## 2022-06-14 DIAGNOSIS — I48.0 PAROXYSMAL ATRIAL FIBRILLATION: ICD-10-CM

## 2022-06-14 DIAGNOSIS — E66.01 MORBID OBESITY: ICD-10-CM

## 2022-06-14 DIAGNOSIS — E78.2 MIXED HYPERLIPIDEMIA: ICD-10-CM

## 2022-06-14 DIAGNOSIS — Z95.810 BIVENTRICULAR AUTOMATIC IMPLANTABLE CARDIOVERTER DEFIBRILLATOR IN SITU: ICD-10-CM

## 2022-06-14 DIAGNOSIS — I10 ESSENTIAL HYPERTENSION: Primary | ICD-10-CM

## 2022-06-14 DIAGNOSIS — I42.8 NONISCHEMIC CARDIOMYOPATHY: ICD-10-CM

## 2022-06-14 PROCEDURE — 99999 PR PBB SHADOW E&M-EST. PATIENT-LVL V: ICD-10-PCS | Mod: PBBFAC,GC,, | Performed by: INTERNAL MEDICINE

## 2022-06-14 PROCEDURE — 99214 PR OFFICE/OUTPT VISIT, EST, LEVL IV, 30-39 MIN: ICD-10-PCS | Mod: S$PBB,GC,, | Performed by: INTERNAL MEDICINE

## 2022-06-14 PROCEDURE — 99999 PR PBB SHADOW E&M-EST. PATIENT-LVL V: CPT | Mod: PBBFAC,GC,, | Performed by: INTERNAL MEDICINE

## 2022-06-14 PROCEDURE — 99215 OFFICE O/P EST HI 40 MIN: CPT | Mod: PBBFAC | Performed by: INTERNAL MEDICINE

## 2022-06-14 PROCEDURE — 99214 OFFICE O/P EST MOD 30 MIN: CPT | Mod: S$PBB,GC,, | Performed by: INTERNAL MEDICINE

## 2022-06-14 NOTE — TELEPHONE ENCOUNTER
----- Message from William Walker sent at 6/14/2022  2:41 PM CDT -----  Contact: patient  Patient requesting call back in regards to scheduling appointment.       Patient @[phone] (home)

## 2022-06-20 ENCOUNTER — TELEPHONE (OUTPATIENT)
Dept: SPINE | Facility: CLINIC | Age: 57
End: 2022-06-20
Payer: MEDICARE

## 2022-06-20 NOTE — TELEPHONE ENCOUNTER
Staff spoke with patient in regards to her appointment on 06/21/22 with Dr. Bonilla,but patient requested to be rescheduled to 06/28/22 @ 10:00 am with

## 2022-06-27 ENCOUNTER — OFFICE VISIT (OUTPATIENT)
Dept: PSYCHIATRY | Facility: CLINIC | Age: 57
End: 2022-06-27
Payer: MEDICARE

## 2022-06-27 ENCOUNTER — TELEPHONE (OUTPATIENT)
Dept: SPINE | Facility: CLINIC | Age: 57
End: 2022-06-27
Payer: MEDICARE

## 2022-06-27 DIAGNOSIS — F33.1 DEPRESSION, MAJOR, RECURRENT, MODERATE: Primary | ICD-10-CM

## 2022-06-27 DIAGNOSIS — F43.21 GRIEF: ICD-10-CM

## 2022-06-27 PROCEDURE — 90834 PR PSYCHOTHERAPY W/PATIENT, 45 MIN: ICD-10-PCS | Mod: ,,, | Performed by: SOCIAL WORKER

## 2022-06-27 PROCEDURE — 90834 PSYTX W PT 45 MINUTES: CPT | Mod: ,,, | Performed by: SOCIAL WORKER

## 2022-06-27 NOTE — TELEPHONE ENCOUNTER
This message is for patient in regards to his or hers appointment 06/28/22 at 10:00 am with Dr.Christine Chad M.D.    On the 4th floor [address] in the [facility] and [facility]. We do ask that patients arrive 15 minutes before appointment time.  Patient may contact [phone] if there is any questions or concerns. Thank you for entrusting in [facility] with your care.

## 2022-06-27 NOTE — PROGRESS NOTES
Individual Psychotherapy (PhD/LCSW)    6/27/2022    Site:  [facility]         Therapeutic Intervention: Met with patient.  Outpatient - Insight oriented psychotherapy 45 min - CPT code 62725    Chief complaint/reason for encounter: depression     Interval history and content of current session: Pt seen in office.  Last seen in November of 2021.  She reports that her older sister who took care of her was killed in an auto crash while in [place] for a family wedding.  She has been devastated by this.  Her daughter who is 35 has been helping her with medication and spending time with her.  Daughter feels she could be more helpful if she lived with pt and pt requests letter for her housing person to verify that she needs to have her daughter move in with her.  Letter was given to her.    Treatment plan:  · Target symptoms: depression  · Why chosen therapy is appropriate versus another modality: relevant to diagnosis  · Outcome monitoring methods: self-report, observation  · Therapeutic intervention type: insight oriented psychotherapy, supportive psychotherapy    Risk parameters:  Patient reports no suicidal ideation  Patient reports no homicidal ideation  Patient reports no self-injurious behavior  Patient reports no violent behavior    Verbal deficits: None    Patient's response to intervention:  The patient's response to intervention is motivated.    Progress toward goals and other mental status changes:  The patient's progress toward goals is fair .    Diagnosis:     ICD-10-CM ICD-9-CM   1. Depression, major, recurrent, moderate  F33.1 296.32   2. Grief  F43.21 309.0       Plan:  individual psychotherapy, group psychotherapy and medication management by physician    Return to clinic: 1 month     OT Acute Treatment  Plan of Care Note            Pt seen on 3 [facility] nursing unit.        ASSESSMENT:   Emphasis of session included transfers training, sitting tolerance and balance at edge of bed and bed mobility. Patient requires encouragement to participate. Wife present during session. Patient required verbal and tactile cues supine to sit. Demonstrated static sitting balance at edge of bed with SBA to CGA to maintain balance for approximately 4 minutes. Transfers from bed with total assist of 2 with use of marisela stedy, physical assist for foot placement and verbal cues for hand placement. Required step by step verbal cues for sequencing and to stand straight; CGA to transfers to recliner using marisela stedy. Maximal assist of 2 to reposition in recliner, patient was provided verbal and tactile cues to push through arms and BLE's to assist to push back in recliner.    Patient's overall level of function is below baseline.  Patient's response to treatment needs encouragement.   Continued skilled therapy is indicated to address increased strength, balance, functional activity tolerance, ADL's and transfers training.   Patient is making slow progress toward goals     OT Identified Barriers to Discharge: weakness, endurance, decreased ability to care for self     PLAN:   Continue skilled OT, including the following Treatment Interventions: ADL retraining;Functional transfer training;UE strengthening/ROM;Endurance training;Cognitive reorientation;Patient/Family training;Equipment eval/education;Neuro muscular reeducation;Compensatory technique education (08/26/18 0211)   OT Frequency: 5 days/week (08/26/18 0211)    Treatment Plan for Next Session: UE ADL's, functional transfers  Additional Plan Considerations: lives with wife, caregiver 3x/week       DIAGNOSIS:  1. Acute cholecystitis    2. Pleural effusion, left    3. Sepsis, due to unspecified organism (CMS/[facility])    4. Palliative care by specialist    5. Abdominal fluid  collection        Co-morbidities:   Patient Active Problem List   Diagnosis   • Other and unspecified hyperlipidemia   • Type II or unspecified type diabetes mellitus without mention of complication, not stated as uncontrolled   • Obesity   • ASVD (arteriosclerotic vascular disease)   • HTN (hypertension)   • Essential tremor   • BPH (benign prostatic hypertrophy)   • CHRISTIAN (obstructive sleep apnea)   • Prostate cancer (CMS/HCC)   • IDDM (insulin dependent diabetes mellitus) (CMS/[facility])   • Peripheral autonomic neuropathy in disorders classified elsewhere(337.1)   • Dermatophytosis of nail   • Edema   • Unspecified venous (peripheral) insufficiency   • Other hammer toe (acquired)   • Abnormality of gait   • Memory loss   • Other nonspecific abnormal result of function study of brain and central nervous system   • Normal pressure hydrocephalus   • Incontinence of urine   • S/P CABG x 3   • expected acute post op blood loss   • Long term (current) use of anticoagulants   • Nonhealing surgical wound   • Pathologic fracture of vertebrae   • Lumbar spinal stenosis   • Compression deformity of vertebra   • Closed fracture of lumbar vertebra without mention of spinal cord injury   • Sepsis (CMS/[facility])   • Acute UTI   • Cellulitis of left leg   • BP (bullous pemphigoid)   • Bullous pemphigoid   • Lymphedema   • Sepsis (CMS/[facility])   • Sepsis(995.91)   • Chronic pain   • Prostate cancer (CMS/[facility])   • Leukocytosis   • Generalized weakness   • Generalized weakness   • Cellulitis of left leg   • 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 (CMS/[facility])   • Atrial fibrillation, chronic (CMS/HCC)   • Skin flap necrosis (CMS/[facility])   • CKD (chronic kidney disease), stage III   • Anemia, chronic disease   • Acute cholecystitis   • Pleural effusion, left           SUBJECTIVE: Subjective: Pt agreed to participate after encouragement (08/26/18 0211)    OBJECTIVE:  Precautions:  Precautions  Other Precautions: contact (08/24/18  1601)  Precautions Comments: activity as toleated (08/24/18 1601)  RN reported Navarro Fall Scale Score: 110 (>45 is considered at risk of fall)  Vitals:     Activity Tolerance:  limited by decreased cognition, balance, functional activity tolerance, strength  Exercise:       Functional Status (as of date/time noted)  ADLs:  Self Cares/ADL's  Grooming Assistance: Moderate Assist (Mod) (08/21/18 1237)  Bathing Assistance: Supine, bed (uppers max assist, lowers total) (08/15/18 0938)  Bathing Deficit: Right upper leg;Left upper leg;Buttocks;Perineal area (08/15/18 0938)  Upper Body Dressing Assistance: Moderate Assist (Mod) (08/21/18 1237)  Lower Body Clothing Assistance: Total Assist - Non-dependent (08/21/18 1237)  Footwear Assistance: Total Assist - Non-dependent (08/21/18 1237)  Self Cares/ADL's Comments #1: Pt required total assist with bathing LE due to incont. (08/21/18 1237)    Household Mobility:  Household Mobility  Rolling: Total Assist - Non-Dependent (08/24/18 1601)  Supine to Sit: Minimal Assist (Min) (08/26/18 0211)  Bed to Chair: Total Assist - Dependent (marisela steady an max asisst x 2) (08/24/18 1601)  Sit to Stand: Touching/Steadying Assistance (CGA from marisela stedy, total of 2 from bed) (08/26/18 0211)  Stand to Sit: Touching/Steadying Assistance (from marisela stedy) (08/26/18 0211)  Stand Pivot Transfers: Maximal Assist (Max) (08/21/18 1237)  Transfer Equipment: gait belt marisela stedy,  (08/26/18 0211)  Sitting - Static: Touching/Steadying Assistance (08/26/18 0211)  Sitting - Dynamic: Minimal Assist (Min);Touching/Steadying Assistance (08/26/18 0211)  Standing - Static: Total Assist - Dependent (using Marisela stedy) (08/26/18 0211)  Standing - Dynamic: Total Assist - Dependent (using Marisela stedy) (08/26/18 0211)  Base of Support: Hip Width (08/26/18 4938)  Household Mobility Comments #1: Pt incont urine and BM during sit-stand transfer. Pt states does not have any control  (08/21/18 5533)    Home Management:        See OT flowsheet for full details regarding the OT therapy provided.    GOALS:  Short Term Goals to Be Reviewed On: 08/28/18 (08/24/18 1601)  Short Term Goals Are The Same as Discharge Goals: No (08/24/18 1601)  Goal Agreement: Patient agrees with goals and treatment plan (08/24/18 1601)  Grooming Short Term Goal 1: set-up (08/21/18 1237)  Grooming Discharge Goal 1: set- up (08/15/18 0938)  Bathing Short Term Goal 1: Pt to wash UE with set-up (08/21/18 1237)  Bathing Discharge Goal 1: max assist (08/15/18 0938)  Dressing Short Term Goal 1: UE: Min A (08/21/18 1237)  Dressing Discharge Goal 1: lowers mod assist (08/15/18 0938)  Toileting Short Term Goal 1: Max A (08/21/18 1237)  Toileting Discharge Goal 1: CGA with urinal  (08/15/18 0938)  Home Setting Transfer Short Term Goal 1: Mod A of 1 (08/21/18 1237)  Home Setting Transfer Discharge Goal 1: min assist x 1 (08/15/18 0938)    EDUCATION:   On this date, the patient and patient's spouse was educated on transfers using marisela katz and the importance of changes in position for skin integrity.    The response to education was: Needs reinforcement    RECOMMENDATIONS FOR DISCHARGE:  Recommendations for Discharge: OT: 24 Hour assist;Post acute therapy (08/26/18 0211)    PT/OT Mobility Equipment for Discharge: Pt. owns 2WW, 4WW & W/C; will continue to assess as pt will need lift equipment for transfers if pt plans to return home (08/26/18 0211)  PT/OT ADL Equipment for Discharge: Pt. has grab bars, tub chair, & raised toilet seat. Will continue to assess pending D/C location  (08/26/18 0211)

## 2022-06-28 ENCOUNTER — TELEPHONE (OUTPATIENT)
Dept: SPINE | Facility: CLINIC | Age: 57
End: 2022-06-28
Payer: MEDICARE

## 2022-06-28 NOTE — TELEPHONE ENCOUNTER
Staff spoke with patient in regards to appt today with  and patient stated she forgot about appt and will call us to r/s when ready.

## 2022-06-29 ENCOUNTER — TELEPHONE (OUTPATIENT)
Dept: PHARMACY | Facility: CLINIC | Age: 57
End: 2022-06-29
Payer: MEDICARE

## 2022-06-30 ENCOUNTER — EXTERNAL CHRONIC CARE MANAGEMENT (OUTPATIENT)
Dept: PRIMARY CARE CLINIC | Facility: CLINIC | Age: 57
End: 2022-06-30
Payer: MEDICARE

## 2022-06-30 ENCOUNTER — PATIENT MESSAGE (OUTPATIENT)
Dept: INTERNAL MEDICINE | Facility: CLINIC | Age: 57
End: 2022-06-30
Payer: MEDICARE

## 2022-06-30 ENCOUNTER — PATIENT MESSAGE (OUTPATIENT)
Dept: INTERNAL MEDICINE | Facility: CLINIC | Age: 57
End: 2022-06-30

## 2022-06-30 PROCEDURE — 99490 PR CHRONIC CARE MGMT, 1ST 20 MIN: ICD-10-PCS | Mod: S$PBB,,, | Performed by: INTERNAL MEDICINE

## 2022-06-30 PROCEDURE — 99490 CHRNC CARE MGMT STAFF 1ST 20: CPT | Mod: PBBFAC | Performed by: INTERNAL MEDICINE

## 2022-06-30 PROCEDURE — 99490 CHRNC CARE MGMT STAFF 1ST 20: CPT | Mod: S$PBB,,, | Performed by: INTERNAL MEDICINE

## 2022-06-30 RX ORDER — DOXYCYCLINE 100 MG/1
100 CAPSULE ORAL 2 TIMES DAILY
Qty: 14 CAPSULE | Refills: 0 | Status: SHIPPED | OUTPATIENT
Start: 2022-06-30 | End: 2022-09-23

## 2022-07-06 ENCOUNTER — TELEPHONE (OUTPATIENT)
Dept: ELECTROPHYSIOLOGY | Facility: CLINIC | Age: 57
End: 2022-07-06
Payer: MEDICARE

## 2022-07-08 ENCOUNTER — TELEPHONE (OUTPATIENT)
Dept: INTERNAL MEDICINE | Facility: CLINIC | Age: 57
End: 2022-07-08
Payer: MEDICARE

## 2022-07-08 NOTE — TELEPHONE ENCOUNTER
----- Message from Federica Cortes sent at 7/8/2022 10:37 AM CDT -----  Type:  Needs Medical Advice    Who Called: pt   Symptoms (please be specific): breaking out on back   How long has patient had these symptoms: Monday 07/04  Pharmacy name and phone #:    Would the patient rather a call back or a response via [facility]? Call back   Best Call Back Number:  [phone]  Additional Information:     Requesting an appt   Books were closed until October

## 2022-07-08 NOTE — TELEPHONE ENCOUNTER
Spoke with pt and she will see a provider next week, pt will go to urgent care if the rash gets worse over the weekend. Pt will also send pictures of her rash.

## 2022-07-11 ENCOUNTER — CLINICAL SUPPORT (OUTPATIENT)
Dept: CARDIOLOGY | Facility: HOSPITAL | Age: 57
End: 2022-07-11
Payer: MEDICARE

## 2022-07-11 DIAGNOSIS — Z95.810 PRESENCE OF AUTOMATIC (IMPLANTABLE) CARDIAC DEFIBRILLATOR: ICD-10-CM

## 2022-07-12 NOTE — PROGRESS NOTES
INTERNAL MEDICINE CLINIC - SAME DAY APPOINTMENT  Progress Note    PRESENTING HISTORY     PCP: Tiffany Mina MD    Chief Complaint/Reason for Visit:   No chief complaint on file.    History of Present Illness & ROS : Ms. Amna Chawla is a 57 y.o. female.    Same day appt.   New to me.   Pleasant lady.   Reports, 'have very sensitive skin', and on July 5th, she used her Romance perfume to spray a new red shirt (:never put the perfume on my skin') and immediately began to itch to entire back. No pain. Tried over the counter steroid cream, helped a little.   No SOB, coughing or other discomforts.   No visible 'blisters' to the areas. States, 'the itching is all over my back'.   Has been taking Benadryl.   Has reportedly been seen by allergist in the past.     Review of Systems:  Eyes: denies visual changes at this time denies floaters   ENT: no nasal congestion or sore throat  Respiratory: no cough or shorness of breath  Cardiovascular: no chest pain or palpitations  Gastrointestinal: no nausea or vomiting, no abdominal pain or change in bowel habits  Genitourinary: no hematuria or dysuria; denies frequency  Hematologic/Lymphatic: no easy bruising or lymphadenopathy  Musculoskeletal: no arthralgias or myalgias  Neurological: no seizures or tremors  Endocrine: no heat or cold intolerance      PAST HISTORY:     Past Medical History:   Diagnosis Date    Anticoagulant long-term use     Anxiety     Asthma     Atrial fibrillation     Brain anoxic injury     Cervicalgia 8/28/2014    CHI (closed head injury) 2/19/2013    Convulsion 5/30/2015    Decreased ROM of left shoulder 4/12/2017    Defibrillator activation 2013    Depression     Heart block     History of sudden cardiac arrest 2/2013    PEA arrest with subsequent long-QT    Hx of psychiatric care     Hypertension     Left atrial enlargement 4/11/2018    Pacemaker 2013    Paresthesia 11/1/2013    Prolonged Q-T interval on ECG 2/8/2013    Psychiatric  problem     Seizures     Sleep difficulties     Stroke     weakness lt side    Therapy     Thyroid disease     Upper airway resistance syndrome 2/21/2017       Past Surgical History:   Procedure Laterality Date    breast reduction      CARDIAC DEFIBRILLATOR PLACEMENT      CARDIAC DEFIBRILLATOR PLACEMENT      CARPAL TUNNEL RELEASE Right     COLONOSCOPY N/A 5/7/2019    Procedure: COLONOSCOPY;  Surgeon: Juan Coffey MD;  Location: [facility] (2ND FLR);  Service: Endoscopy;  Laterality: N/A;  per DR. Bustamante Pt to have balloon with DR. Coffey due to excesive looping, could not reach cecum - see telephone encounter 3/8/19 and last procedure report dated 6/24/14/ Per Piedad schedule as 90 min case- ERW  pacemaker/AICD Boitronik/   per , ok to hold Xareltox 2 days    COLONOSCOPY N/A 6/2/2022    Procedure: COLONOSCOPY;  Surgeon: Juan Coffey MD;  Location: [facility] (2ND FLR);  Service: Endoscopy;  Laterality: N/A;  combined orders    ESOPHAGOGASTRODUODENOSCOPY N/A 6/2/2022    Procedure: EGD (ESOPHAGOGASTRODUODENOSCOPY);  Surgeon: Juan Coffey MD;  Location: [facility] (2ND FLR);  Service: Endoscopy;  Laterality: N/A;  BMI 54; pt requests 1st available [facility]  Fully vaccinated, Hold Xarelto x 2 days per Dr. Mejia and Plavix x 5 days per Dr. Grossman see telephone encounter 4/25/22, Biotronik PM/AICD, prep instr portal and mailed -ml    HERNIA REPAIR      HIATAL HERNIA REPAIR      INSERT / REPLACE / REMOVE PACEMAKER  10/2017    CRT-D upgrade    REVISION OF IMPLANTABLE CARDIOVERTER-DEFIBRILLATOR (ICD) ELECTRODE LEAD PLACEMENT Left 12/7/2020    Procedure: REVISION, INSERTION, ELECTRODE LEAD, ICD;  Surgeon: Avelino Mejia MD;  Location: [facility] EP LAB;  Service: Cardiology;  Laterality: Left;    REVISION OF SKIN POCKET FOR CARDIOVERTER-DEFIBRILLATOR  12/7/2020    Procedure: Revision, Skin Pocket, For Cardioverter-Defibrillator;  Surgeon: Avelino Mejia MD;  Location: [facility] EP LAB;  Service: Cardiology;;     TOTAL REDUCTION MAMMOPLASTY      TRANSESOPHAGEAL ECHOCARDIOGRAPHY N/A 12/7/2020    Procedure: ECHOCARDIOGRAM, TRANSESOPHAGEAL;  Surgeon: Ivory Goodman MD;  Location: [facility] EP LAB;  Service: Cardiology;  Laterality: N/A;    TRANSESOPHAGEAL ECHOCARDIOGRAPHY N/A 12/7/2020    Procedure: ECHOCARDIOGRAM, TRANSESOPHAGEAL;  Surgeon: Patrick Diagnostic Provider;  Location: [facility] OR 2ND FLR;  Service: Cardiology;  Laterality: N/A;    TRIGGER FINGER RELEASE Left 8/2/2019    Procedure: RELEASE, TRIGGER FINGER left middle;  Surgeon: Matt Pugh Jr., MD;  Location: [facility] OR;  Service: Plastics;  Laterality: Left;    TRIGGER FINGER RELEASE Right 2/11/2020    Procedure: RELEASE, TRIGGER FINGER middle;  Surgeon: Matt Pugh Jr., MD;  Location: [facility];  Service: Plastics;  Laterality: Right;    TUBAL LIGATION         Family History   Problem Relation Age of Onset    Hypertension Mother     Glaucoma Maternal Grandmother     Glaucoma Paternal Grandmother     COPD Unknown     Heart failure Unknown        Social History     Socioeconomic History    Marital status: Single   Occupational History     Employer: [id number]   Tobacco Use    Smoking status: Never Smoker    Smokeless tobacco: Never Used   Substance and Sexual Activity    Alcohol use: Yes     Comment: wine, socially    Drug use: No    Sexual activity: Not Currently   Social History Narrative    Now on disability     Social Determinants of Health     Financial Resource Strain: Low Risk     Difficulty of Paying Living Expenses: Not very hard   Food Insecurity: No Food Insecurity    Worried About Running Out of Food in the Last Year: Never true    Ran Out of Food in the Last Year: Never true   Transportation Needs: Unmet Transportation Needs    Lack of Transportation (Medical): Yes    Lack of Transportation (Non-Medical): Yes   Physical Activity: Insufficiently Active    Days of Exercise per Week: 2 days    Minutes of Exercise per  Session: 20 min   Stress: Stress Concern Present    Feeling of Stress : Rather much   Social Connections: Moderately Isolated    Frequency of Communication with Friends and Family: More than three times a week    Frequency of Social Gatherings with Friends and Family: Three times a week    Attends Bahai Services: Never    Active Member of Clubs or Organizations: No    Attends Club or Organization Meetings: 1 to 4 times per year    Marital Status: Never    Housing Stability: High Risk    Unable to Pay for Housing in the Last Year: Yes    Number of Places Lived in the Last Year: 1    Unstable Housing in the Last Year: No       MEDICATIONS & ALLERGIES:     Current Outpatient Medications on File Prior to Visit   Medication Sig Dispense Refill    acetaminophen (TYLENOL) 325 MG tablet Take 2 tablets (650 mg total) by mouth every 6 (six) hours as needed for Pain. 30 tablet 0    amLODIPine (NORVASC) 5 MG tablet Take 1 tablet (5 mg total) by mouth once daily. 90 each 3    ARIPiprazole (ABILIFY) 15 MG Tab Take 1 tablet (15 mg total) by mouth once daily. 90 tablet 1    blood sugar diagnostic Strp 1 strip by Misc.(Non-Drug; Combo Route) route 2 (two) times daily. 200 strip 3    blood-glucose meter kit Please provide with insurance covered meter 1 each 0    carvediloL (COREG) 25 MG tablet TAKE 1 TABLET(25 MG) BY MOUTH TWICE DAILY WITH MEALS 60 tablet 11    clonazePAM (KLONOPIN) 1 MG tablet Take 1 tablet (1 mg total) by mouth daily as needed for Anxiety. 30 tablet 5    clopidogreL (PLAVIX) 75 mg tablet Take 1 tablet (75 mg total) by mouth once daily. 90 tablet 3    cyanocobalamin, vitamin B-12, 1,000 mcg Subl Place 1,000 mcg under the tongue once daily. 90 tablet 3    diclofenac sodium (VOLTAREN) 1 % Gel Apply 2 g topically 4 (four) times daily. 100 g 1    donepezil (ARICEPT) 10 MG tablet Take 1 tablet (10 mg total) by mouth 2 (two) times daily. 180 tablet 3    doxycycline (VIBRAMYCIN) 100 MG Cap  Take 1 capsule (100 mg total) by mouth 2 (two) times daily. 14 capsule 0    EPINEPHrine (EPIPEN) 0.3 mg/0.3 mL AtIn Inject 0.3 mLs (0.3 mg total) into the muscle once. for 1 dose 0.3 mL 0    etodolac (LODINE) 500 MG tablet Take 1 tablet (500 mg total) by mouth 2 (two) times daily as needed (migraine). 30 tablet 12    flurbiprofen (ANSAID) 100 MG tablet Take 1 tablet (100 mg total) by mouth 2 (two) times daily as needed (migraine). 12 tablet 12    fremanezumab-vfrm (AJOVY) 225 mg/1.5 mL injection Inject 1.5 mLs (225 mg total) into the skin every 28 days. 1 each 12    furosemide (LASIX) 20 MG tablet Take 1 tablet (20 mg total) by mouth once daily. 30 tablet 6    gabapentin (NEURONTIN) 300 MG capsule Take 3 capsules (900 mg total) by mouth 3 (three) times daily. 810 capsule 12    lancets Misc 1 Device by Misc.(Non-Drug; Combo Route) route 2 (two) times daily. 200 each 3    losartan (COZAAR) 100 MG tablet Take 1 tablet (100 mg total) by mouth once daily. 90 tablet 3    magnesium 200 mg Tab Take 200 mg by mouth once daily. (Patient taking differently: Take 200 mg by mouth every other day.) 30 each 12    mupirocin (BACTROBAN) 2 % ointment Apply to affected area 3 times daily 22 g 1    omeprazole (PRILOSEC) 40 MG capsule Take 1 capsule (40 mg total) by mouth once daily. Take 30 minutes before breakfast 90 capsule 6    ondansetron (ZOFRAN-ODT) 4 MG TbDL Take 1 tablet (4 mg total) by mouth every 12 (twelve) hours as needed (nausea). 40 tablet 12    ondansetron (ZOFRAN-ODT) 4 MG TbDL Take 1 tablet (4 mg total) by mouth every 12 (twelve) hours as needed (nausea). 40 tablet 12    rivaroxaban (XARELTO) 20 mg Tab TAKE 1 TABLET BY MOUTH DAILY EVENING MEAL WITH DINNER 30 tablet 11    rosuvastatin (CRESTOR) 40 MG Tab Take 1 tablet (40 mg total) by mouth every evening. 90 tablet 3    sertraline (ZOLOFT) 100 MG tablet Take 1.5 tablets (150 mg total) by mouth once daily. 135 tablet 3    silver sulfADIAZINE 1%  (SILVADENE) 1 % cream Apply topically 2 (two) times daily. 50 g 0    suvorexant (BELSOMRA) 10 mg Tab Take 1 tablet by mouth nightly as needed (insomnia). 15 tablet 1    tiaGABine (GABITRIL) 4 MG tablet Take 1 tablet (4 mg total) by mouth every evening. 30 tablet 12    tiZANidine (ZANAFLEX) 4 MG tablet TAKE 1 TABLET(4 MG) BY MOUTH EVERY 6 HOURS AS NEEDED 90 tablet 12    tiZANidine (ZANAFLEX) 4 MG tablet Take 1 tablet (4 mg total) by mouth every 6 (six) hours as needed. 90 tablet 12    tiZANidine (ZANAFLEX) 4 MG tablet Take 1 tablet (4 mg total) by mouth every 6 (six) hours as needed. 90 tablet 12    tiZANidine (ZANAFLEX) 4 MG tablet TAKE 1 TABLET(4 MG) BY MOUTH EVERY 6 HOURS AS NEEDED 90 tablet 12    traZODone (DESYREL) 100 MG tablet Take 1 tablet (100 mg total) by mouth every evening. 30 tablet 11    TRUE METRIX GLUCOSE METER Misc TEST BG BID  0    ubrogepant (UBROGEPANT) 100 mg tablet Take 1 tablet (100 mg total) by mouth 2 (two) times daily as needed for Migraine. 10 tablet 12    zolpidem (AMBIEN) 10 mg Tab Take 1 tablet (10 mg total) by mouth nightly as needed (insomnia). 30 tablet 5    [DISCONTINUED] metFORMIN (GLUCOPHAGE) 500 MG tablet Take 1 tablet (500 mg total) by mouth 2 (two) times daily with meals. 180 tablet 3     Current Facility-Administered Medications on File Prior to Visit   Medication Dose Route Frequency Provider Last Rate Last Admin    ketorolac injection 60 mg  60 mg Intramuscular 1 time in Clinic/HOD David Cortez MD        ketorolac injection 60 mg  60 mg Intramuscular 1 time in Clinic/HOD David Cortez MD        lactated ringers infusion   Intravenous Continuous Humberto Angel MD   Stopped at 02/11/20 0801    lidocaine (PF) 10 mg/ml (1%) injection 5 mg  0.5 mL Intradermal Once Humberto Angel MD        onabotulinumtoxina injection 200 Units  200 Units Intramuscular Q90 Days David Cortez MD   200 Units at 10/19/18 1713    onabotulinumtoxina injection 200 Units  200 Units  Intramuscular Q90 Days David Cortez MD   200 Units at 12/28/18 1106    onabotulinumtoxina injection 200 Units  200 Units Intramuscular Q90 Days David Cortez MD   200 Units at 03/13/19 1504    onabotulinumtoxina injection 200 Units  200 Units Intramuscular Q90 Days David Cortez MD   200 Units at 05/23/19 1123    onabotulinumtoxina injection 200 Units  200 Units Intramuscular Q90 Days David Cortez MD   200 Units at 10/11/19 1112    onabotulinumtoxina injection 200 Units  200 Units Intramuscular Q90 Days David Cortez MD   200 Units at 12/19/19 1036    onabotulinumtoxina injection 200 Units  200 Units Intramuscular Q10 weeks David Cortez MD   200 Units at 03/10/20 1036    onabotulinumtoxina injection 200 Units  200 Units Intramuscular Q90 Days David Cortez MD   200 Units at 06/26/20 1538    onabotulinumtoxina injection 200 Units  200 Units Intramuscular q12 weeks David Cortez MD   200 Units at 05/19/22 1002        Review of patient's allergies indicates:   Allergen Reactions    Aspirin Hives    Imitrex [sumatriptan] Palpitations    Penicillins Hives and Swelling    Shellfish containing products Anaphylaxis     seafood    Reglan [metoclopramide hcl] Other (See Comments)     Parkinsonism        Medications Reconciliation:   I have reconciled the patient's home medications with the patient/family. I have updated all changes.  Refer to After-Visit Medication List.    OBJECTIVE:     Vital Signs:  There were no vitals filed for this visit.  Wt Readings from Last 3 Encounters:   06/14/22 1248 (!) 142 kg (313 lb 0.9 oz)   06/13/22 0950 (!) 141.5 kg (312 lb)   06/02/22 0746 (!) 140.6 kg (310 lb)     There is no height or weight on file to calculate BMI.   Wt Readings from Last 3 Encounters:   07/13/22 (!) 141.7 kg (312 lb 6.3 oz)   06/14/22 (!) 142 kg (313 lb 0.9 oz)   06/13/22 (!) 141.5 kg (312 lb)     Temp Readings from Last 3 Encounters:   06/02/22 98 °F (36.7 °C) (Temporal)   05/17/22 98 °F (36.7  °C) (Oral)   05/15/22 97.9 °F (36.6 °C) (Oral)     BP Readings from Last 3 Encounters:   07/13/22 128/78   06/14/22 128/61   06/02/22 120/78     Pulse Readings from Last 3 Encounters:   07/13/22 88   06/14/22 70   06/02/22 64       Physical Exam:  (Focused Exam)  General: Well developed, well nourished. No distress.  HEENT: Head is normocephalic, atraumatic  Eyes: Clear conjunctiva.  Neck: Supple, symmetrical neck; trachea midline.  Lungs: Clear to auscultation bilaterally and normal respiratory effort  Extremities: No LE edema. Pulses 2+ and symmetric.   Skin: Skin color, texture, turgor normal.   No isolated dermatome; scattered areas of hives to bilateral lower back region; no vesicle formations. Notations made to her 'scratching', but no secondary infectious process, such as a cellulitic process.   Musculoskeletal: Normal gait.   Lymph Nodes: No cervical or supraclavicular adenopathy.  Neurologic: Normal strength and tone. No focal numbness or weakness.   Psychiatric: Not depressed.      Laboratory  Lab Results   Component Value Date    WBC 4.21 05/17/2022    HGB 11.7 (L) 05/17/2022    HCT 27 (L) 05/17/2022     05/17/2022    CHOL 126 05/17/2022    TRIG 75 05/17/2022    HDL 38 (L) 05/17/2022    ALT 21 05/17/2022    AST 27 05/17/2022     (L) 05/17/2022    K 4.7 05/17/2022     05/17/2022    CREATININE 0.8 05/17/2022    BUN 8 05/17/2022    CO2 21 (L) 05/17/2022    TSH 1.185 05/17/2022    INR 1.3 (H) 05/17/2022    HGBA1C 6.4 (H) 04/12/2022         ASSESSMENT & PLAN:     Same day appt.     A1c: 6.4% in 4/2022.       Contact dermatitis, unspecified contact dermatitis type, unspecified trigger  -     cetirizine (ZYRTEC) 10 MG tablet; Take 1 tablet (10 mg total) by mouth once daily. for 7 days  Dispense: 30 tablet; Refill: 0  -     hydrOXYzine HCL (ATARAX) 25 MG tablet; Take 1 tablet (25 mg total) by mouth 3 (three) times daily as needed for Itching.  Dispense: 20 tablet; Refill: 0  -     triamcinolone  acetonide injection 40 mg  -     famotidine (PEPCID) 20 MG tablet; Take 1 tablet (20 mg total) by mouth 2 (two) times daily. for 7 days  Dispense: 14 tablet; Refill: 0  -     triamcinolone acetonide 0.1% (KENALOG) 0.1 % ointment; Apply topically 2 (two) times daily.  Dispense: 30 g; Refill: 1      Future Appointments   Date Time Provider Department Center   8/22/2022  1:30 PM Alexandra Dunn MD [facility] BARIAT [facility]   9/23/2022  8:20 AM COORDINATED DEVICE CHECK [facility]   9/23/2022  9:00 AM EKG, APPT [facility] EKG [facility]   9/23/2022 10:30 AM Silvia Wang NP [facility]   10/9/2022 10:00 [facility]   10/19/2022  8:30 AM [facility]        Medication List          Accurate as of July 13, 2022 12:42 PM. If you have any questions, ask your nurse or doctor.            START taking these medications    cetirizine 10 MG tablet  Commonly known as: ZYRTEC  Take 1 tablet (10 mg total) by mouth once daily. for 7 days  Started by: INOCENCIA Leonard     famotidine 20 MG tablet  Commonly known as: PEPCID  Take 1 tablet (20 mg total) by mouth 2 (two) times daily. for 7 days  Started by: INOCENCIA Leonard     hydrOXYzine HCL 25 MG tablet  Commonly known as: ATARAX  Take 1 tablet (25 mg total) by mouth 3 (three) times daily as needed for Itching.  Started by: INOCENCIA Leonard     triamcinolone acetonide 0.1% 0.1 % ointment  Commonly known as: KENALOG  Apply topically 2 (two) times daily.  Started by: INOCENCIA Leonard        CHANGE how you take these medications    magnesium 200 mg Tab  Take 200 mg by mouth once daily.  What changed: when to take this        CONTINUE taking these medications    acetaminophen 325 MG tablet  Commonly known as: TYLENOL  Take 2 tablets (650 mg total) by mouth every 6 (six) hours as needed for Pain.     amLODIPine 5 MG tablet  Commonly known as: NORVASC  Take 1  tablet (5 mg total) by mouth once daily.     ARIPiprazole 15 MG Tab  Commonly known as: ABILIFY  Take 1 tablet (15 mg total) by mouth once daily.     BELSOMRA 10 mg Tab  Generic drug: suvorexant  Take 1 tablet by mouth nightly as needed (insomnia).     blood sugar diagnostic Strp  1 strip by Misc.(Non-Drug; Combo Route) route 2 (two) times daily.     * blood-glucose meter kit  Please provide with insurance covered meter     * TRUE METRIX GLUCOSE METER Misc  Generic drug: blood-glucose meter     carvediloL 25 MG tablet  Commonly known as: COREG  TAKE 1 TABLET(25 MG) BY MOUTH TWICE DAILY WITH MEALS     clonazePAM 1 MG tablet  Commonly known as: KlonoPIN  Take 1 tablet (1 mg total) by mouth daily as needed for Anxiety.     clopidogreL 75 mg tablet  Commonly known as: PLAVIX  Take 1 tablet (75 mg total) by mouth once daily.     cyanocobalamin (vitamin B-12) 1,000 mcg Subl  Place 1,000 mcg under the tongue once daily.     diclofenac sodium 1 % Gel  Commonly known as: VOLTAREN  Apply 2 g topically 4 (four) times daily.     donepeziL 10 MG tablet  Commonly known as: ARICEPT  Take 1 tablet (10 mg total) by mouth 2 (two) times daily.     doxycycline 100 MG Cap  Commonly known as: VIBRAMYCIN  Take 1 capsule (100 mg total) by mouth 2 (two) times daily.     EPINEPHrine 0.3 mg/0.3 mL Atin  Commonly known as: EPIPEN  Inject 0.3 mLs (0.3 mg total) into the muscle once. for 1 dose     etodolac 500 MG tablet  Commonly known as: LODINE  Take 1 tablet (500 mg total) by mouth 2 (two) times daily as needed (migraine).     flurbiprofen 100 MG tablet  Commonly known as: ANSAID  Take 1 tablet (100 mg total) by mouth 2 (two) times daily as needed (migraine).     fremanezumab-vfrm 225 mg/1.5 mL injection  Commonly known as: AJOVY  Inject 1.5 mLs (225 mg total) into the skin every 28 days.     furosemide 20 MG tablet  Commonly known as: LASIX  Take 1 tablet (20 mg total) by mouth once daily.     gabapentin 300 MG capsule  Commonly known as:  NEURONTIN  Take 3 capsules (900 mg total) by mouth 3 (three) times daily.     lancets Misc  1 Device by Misc.(Non-Drug; Combo Route) route 2 (two) times daily.     losartan 100 MG tablet  Commonly known as: COZAAR  Take 1 tablet (100 mg total) by mouth once daily.     mupirocin 2 % ointment  Commonly known as: BACTROBAN  Apply to affected area 3 times daily     omeprazole 40 MG capsule  Commonly known as: PRILOSEC  Take 1 capsule (40 mg total) by mouth once daily. Take 30 minutes before breakfast     * ondansetron 4 MG Tbdl  Commonly known as: ZOFRAN-ODT  Take 1 tablet (4 mg total) by mouth every 12 (twelve) hours as needed (nausea).     * ondansetron 4 MG Tbdl  Commonly known as: ZOFRAN-ODT  Take 1 tablet (4 mg total) by mouth every 12 (twelve) hours as needed (nausea).     rivaroxaban 20 mg Tab  Commonly known as: XARELTO  TAKE 1 TABLET BY MOUTH DAILY EVENING MEAL WITH DINNER     rosuvastatin 40 MG Tab  Commonly known as: CRESTOR  Take 1 tablet (40 mg total) by mouth every evening.     sertraline 100 MG tablet  Commonly known as: ZOLOFT  Take 1.5 tablets (150 mg total) by mouth once daily.     silver sulfADIAZINE 1% 1 % cream  Commonly known as: SILVADENE  Apply topically 2 (two) times daily.     tiaGABine 4 MG tablet  Commonly known as: GABITRIL  Take 1 tablet (4 mg total) by mouth every evening.     * tiZANidine 4 MG tablet  Commonly known as: ZANAFLEX  Take 1 tablet (4 mg total) by mouth every 6 (six) hours as needed.     * tiZANidine 4 MG tablet  Commonly known as: ZANAFLEX  TAKE 1 TABLET(4 MG) BY MOUTH EVERY 6 HOURS AS NEEDED     * tiZANidine 4 MG tablet  Commonly known as: ZANAFLEX  Take 1 tablet (4 mg total) by mouth every 6 (six) hours as needed.     * tiZANidine 4 MG tablet  Commonly known as: ZANAFLEX  TAKE 1 TABLET(4 MG) BY MOUTH EVERY 6 HOURS AS NEEDED     traZODone 100 MG tablet  Commonly known as: DESYREL  Take 1 tablet (100 mg total) by mouth every evening.     UBRELVY 100 mg tablet  Generic drug:  ubrogepant  Take 1 tablet (100 mg total) by mouth 2 (two) times daily as needed for Migraine.     zolpidem 10 mg Tab  Commonly known as: AMBIEN  Take 1 tablet (10 mg total) by mouth nightly as needed (insomnia).         * This list has 8 medication(s) that are the same as other medications prescribed for you. Read the directions carefully, and ask your doctor or other care provider to review them with you.               Where to Get Your Medications      These medications were sent to [id number] - [place], [place] - [address], [place] [address]    Phone: [phone]   · hydrOXYzine HCL 25 MG tablet  · triamcinolone acetonide 0.1% 0.1 % ointment     You can get these medications from any pharmacy    You don't need a prescription for these medications  · famotidine 20 MG tablet     Information about where to get these medications is not yet available    Ask your nurse or doctor about these medications  · cetirizine 10 MG tablet       Signing Physician:  INOCENCIA Leonard

## 2022-07-13 ENCOUNTER — OFFICE VISIT (OUTPATIENT)
Dept: INTERNAL MEDICINE | Facility: CLINIC | Age: 57
End: 2022-07-13
Payer: MEDICARE

## 2022-07-13 VITALS
BODY MASS INDEX: 50.02 KG/M2 | HEIGHT: 64 IN | WEIGHT: 293 LBS | SYSTOLIC BLOOD PRESSURE: 128 MMHG | OXYGEN SATURATION: 97 % | HEART RATE: 88 BPM | DIASTOLIC BLOOD PRESSURE: 78 MMHG

## 2022-07-13 DIAGNOSIS — L25.9 CONTACT DERMATITIS, UNSPECIFIED CONTACT DERMATITIS TYPE, UNSPECIFIED TRIGGER: Primary | ICD-10-CM

## 2022-07-13 PROCEDURE — 99213 OFFICE O/P EST LOW 20 MIN: CPT | Mod: PBBFAC | Performed by: NURSE PRACTITIONER

## 2022-07-13 PROCEDURE — 99999 PR PBB SHADOW E&M-EST. PATIENT-LVL III: CPT | Mod: PBBFAC,,, | Performed by: NURSE PRACTITIONER

## 2022-07-13 PROCEDURE — 99214 PR OFFICE/OUTPT VISIT, EST, LEVL IV, 30-39 MIN: ICD-10-PCS | Mod: S$PBB,,, | Performed by: NURSE PRACTITIONER

## 2022-07-13 PROCEDURE — 99999 PR PBB SHADOW E&M-EST. PATIENT-LVL III: ICD-10-PCS | Mod: PBBFAC,,, | Performed by: NURSE PRACTITIONER

## 2022-07-13 PROCEDURE — 99214 OFFICE O/P EST MOD 30 MIN: CPT | Mod: S$PBB,,, | Performed by: NURSE PRACTITIONER

## 2022-07-13 RX ORDER — TRIAMCINOLONE ACETONIDE 40 MG/ML
40 INJECTION, SUSPENSION INTRA-ARTICULAR; INTRAMUSCULAR
Status: SHIPPED | OUTPATIENT
Start: 2022-07-13

## 2022-07-13 RX ORDER — TRIAMCINOLONE ACETONIDE 1 MG/G
OINTMENT TOPICAL 2 TIMES DAILY
Qty: 30 G | Refills: 1 | Status: SHIPPED | OUTPATIENT
Start: 2022-07-13 | End: 2022-10-19 | Stop reason: SDUPTHER

## 2022-07-13 RX ORDER — CETIRIZINE HYDROCHLORIDE 10 MG/1
10 TABLET ORAL DAILY
Qty: 30 TABLET | Refills: 0
Start: 2022-07-13 | End: 2024-03-18

## 2022-07-13 RX ORDER — FAMOTIDINE 20 MG/1
20 TABLET, FILM COATED ORAL 2 TIMES DAILY
Qty: 14 TABLET | Refills: 0 | COMMUNITY
Start: 2022-07-13 | End: 2023-10-18

## 2022-07-13 RX ORDER — HYDROXYZINE HYDROCHLORIDE 25 MG/1
25 TABLET, FILM COATED ORAL 3 TIMES DAILY PRN
Qty: 20 TABLET | Refills: 0 | Status: SHIPPED | OUTPATIENT
Start: 2022-07-13

## 2022-07-13 NOTE — PROGRESS NOTES
1241 Used 2 pt identifiers and reviewed allergies prior to giving triamcinolone acetonide injection 40 mg injection in R/deltoid, VIS given then asked pt to wait 15 mins post injection for s/sx of adverse reactions.     1305  No s/sx of adverse reactions, encouraged pt to exercise this limb to avoid stiffness.

## 2022-07-15 ENCOUNTER — TELEPHONE (OUTPATIENT)
Dept: NEUROLOGY | Facility: CLINIC | Age: 57
End: 2022-07-15
Payer: MEDICARE

## 2022-07-15 DIAGNOSIS — G43.711 CHRONIC MIGRAINE WITHOUT AURA, WITH INTRACTABLE MIGRAINE, SO STATED, WITH STATUS MIGRAINOSUS: Primary | ICD-10-CM

## 2022-07-15 NOTE — TELEPHONE ENCOUNTER
----- Message from Francine Lopez sent at 7/15/2022  2:24 PM CDT -----  Type: Patient Call Back    Who called:pt    What is the request in detail:pt requesting to get patient visit summary and notes mailed to her house for dos 4/22/21 and 7/6/21. She is requesting them because they were for botox. She needs proof of botox because medicare is about to garnish her funds. She called medical records but they couldn't help her. Call pt     Can the clinic reply by [facility]?    Would the patient rather a call back or a response via [facility]? call    Best call back number:[phone] (home)       Additional Information:

## 2022-07-26 ENCOUNTER — TELEPHONE (OUTPATIENT)
Dept: NEUROLOGY | Facility: CLINIC | Age: 57
End: 2022-07-26
Payer: MEDICARE

## 2022-07-26 ENCOUNTER — PATIENT MESSAGE (OUTPATIENT)
Dept: NEUROLOGY | Facility: CLINIC | Age: 57
End: 2022-07-26
Payer: MEDICARE

## 2022-07-26 NOTE — TELEPHONE ENCOUNTER
----- Message from Rebecca Bentley sent at 7/26/2022  9:31 AM CDT -----  Contact: pt  Pt requesting call back re: scheduling appt. Nothing in epic.     Confirmed contact below:  Contact Name:Amna Chawla  Phone Number: [phone]

## 2022-07-28 NOTE — TELEPHONE ENCOUNTER
Called Mrs. Chawla to collect information and to reestablish care as she has not seen Dr. Paredes since 10/12/20    Mrs. Chawla described two episodes of full body and then hand tremor that have occurred in the last two weeks. Relative to her baseline, these were uncontrolled and did not respond to the klonazepam she had on hand. She explained that previously lorazepam she has received worked quickly. This may be due to differences in how fast acting the respective medications are, increased tolerance, or the nature of the tremor is different - she is unsure.   - feels shaking in whole body which then channels into both hands  - no difference rest or attempting movement/activity  - not associated with particular time of day  - previous tremors would come and go without pattern, but were short in duration or responsive to anxiolytics    Asked if tremors in general worsen with stress or intense emotions, Mrs. Chawla stated she believes they are due to grief as her sister was recently killed in a car accident. She reported being close to and dependent on her sister as a caregiver. We discussed the recency of this and  acknowledged the grief as surely deep and still ongoing.  - confirmed she is still being followed by psychiatry and psychology  - confirmed clonazepam is being prescribed and managed by psychiatry.  - received Mrs. Chawla's permission to share a summary of this call with her team in psych in addition to Dr. Paredes so they are aware she is seeking additional support, which she acknowledged may be best addressed by that team to manage rx if these episodes an expression of deep grief.

## 2022-07-29 NOTE — TELEPHONE ENCOUNTER
Spoke with Mrs. Chawla and was able to gather information on present situation (documented in message thread) and awaiting scheduling pending feedback. Dr. Paredes is open to seeing as already established.

## 2022-07-31 ENCOUNTER — EXTERNAL CHRONIC CARE MANAGEMENT (OUTPATIENT)
Dept: PRIMARY CARE CLINIC | Facility: CLINIC | Age: 57
End: 2022-07-31
Payer: MEDICARE

## 2022-07-31 PROCEDURE — 99490 PR CHRONIC CARE MGMT, 1ST 20 MIN: ICD-10-PCS | Mod: S$PBB,,, | Performed by: INTERNAL MEDICINE

## 2022-07-31 PROCEDURE — 99490 CHRNC CARE MGMT STAFF 1ST 20: CPT | Mod: S$PBB,,, | Performed by: INTERNAL MEDICINE

## 2022-07-31 PROCEDURE — 99490 CHRNC CARE MGMT STAFF 1ST 20: CPT | Mod: PBBFAC | Performed by: INTERNAL MEDICINE

## 2022-08-22 ENCOUNTER — OFFICE VISIT (OUTPATIENT)
Dept: BARIATRICS | Facility: CLINIC | Age: 57
End: 2022-08-22
Payer: MEDICARE

## 2022-08-22 VITALS
SYSTOLIC BLOOD PRESSURE: 126 MMHG | DIASTOLIC BLOOD PRESSURE: 72 MMHG | BODY MASS INDEX: 48.82 KG/M2 | WEIGHT: 293 LBS | HEART RATE: 73 BPM | HEIGHT: 65 IN | OXYGEN SATURATION: 98 %

## 2022-08-22 DIAGNOSIS — K21.9 GASTROESOPHAGEAL REFLUX DISEASE WITHOUT ESOPHAGITIS: ICD-10-CM

## 2022-08-22 DIAGNOSIS — E78.2 MIXED HYPERLIPIDEMIA: ICD-10-CM

## 2022-08-22 DIAGNOSIS — Z71.3 ENCOUNTER FOR WEIGHT LOSS COUNSELING: ICD-10-CM

## 2022-08-22 DIAGNOSIS — G47.33 OBSTRUCTIVE SLEEP APNEA: ICD-10-CM

## 2022-08-22 DIAGNOSIS — I10 ESSENTIAL HYPERTENSION: ICD-10-CM

## 2022-08-22 DIAGNOSIS — E66.01 CLASS 3 SEVERE OBESITY DUE TO EXCESS CALORIES WITH SERIOUS COMORBIDITY AND BODY MASS INDEX (BMI) OF 50.0 TO 59.9 IN ADULT: Primary | ICD-10-CM

## 2022-08-22 DIAGNOSIS — R73.03 PREDIABETES: ICD-10-CM

## 2022-08-22 PROCEDURE — 99204 OFFICE O/P NEW MOD 45 MIN: CPT | Mod: S$PBB,,, | Performed by: STUDENT IN AN ORGANIZED HEALTH CARE EDUCATION/TRAINING PROGRAM

## 2022-08-22 PROCEDURE — 99204 PR OFFICE/OUTPT VISIT, NEW, LEVL IV, 45-59 MIN: ICD-10-PCS | Mod: S$PBB,,, | Performed by: STUDENT IN AN ORGANIZED HEALTH CARE EDUCATION/TRAINING PROGRAM

## 2022-08-22 PROCEDURE — 99999 PR PBB SHADOW E&M-EST. PATIENT-LVL V: ICD-10-PCS | Mod: PBBFAC,,, | Performed by: STUDENT IN AN ORGANIZED HEALTH CARE EDUCATION/TRAINING PROGRAM

## 2022-08-22 PROCEDURE — 99215 OFFICE O/P EST HI 40 MIN: CPT | Mod: PBBFAC | Performed by: STUDENT IN AN ORGANIZED HEALTH CARE EDUCATION/TRAINING PROGRAM

## 2022-08-22 PROCEDURE — 99999 PR PBB SHADOW E&M-EST. PATIENT-LVL V: CPT | Mod: PBBFAC,,, | Performed by: STUDENT IN AN ORGANIZED HEALTH CARE EDUCATION/TRAINING PROGRAM

## 2022-08-22 NOTE — PATIENT INSTRUCTIONS
Food and Beverage Log:  Keep a log of all food and beverages that you consume.  Keeping track of calories will help you lose weight, because monitoring will help you become more aware of what you are eating and recognize your eating patterns.  Be mindful of your hunger level before eating and your satiety level after eating.  Just keeping records will help you make healthy changes in your diet and eat fewer calories.    Tips for keeping a calorie count:     Each day, aim for the daily calorie goal.     Record your food intake immediately after eating. If possible, look up calories before or immediately after eating.     Download an bel like [employer]kr[employer]it Fitness Pal, Lose It!, or ReFashioner (Code: 18134) To keep track of your calories.    Measuring foods (using measuring cups or spoons) will help you be more accurate with counting calories.     Keep a running calorie count throughout the day.     Count daily calories toward a weekly calorie total. If you eat too many calories one day, cut back slightly over the next few days to meet your weekly goal.             Copyright © 2011, [facility]. For more information about The Healthy Eating Plate, please see The Nutrition Source, Department of Nutrition, [employer], www.thenutritionsource.org, and [employer], www.health.[employer].    Meal Planning & Grocery Shopping    Meal planning builds the foundation for healthy eating. When you have structured ideas for healthy meals and foods available at home to prepare those meals, weight control becomes easier.  If only healthy foods are available at home, then you will be much more likely to eat healthy foods. And you will be less likely to go to a restaurant or  a fast food meal, which tend to be unhealthy and higher in calories than meals prepared at home.      Take 5-10 minutes each week to plan meals for the next 7 days.  Make a grocery list based on the meal  plan.    Grocery Shopping Tips:  Shop on a full stomach.  Schedule your shopping for times when you are most motivated and able to be disciplined about your purchases. For example, after a stressful day at work it may be difficult to make the healthiest choices. Shopping at other times, such as early in the morning or after dinner, may be easier.  Focus your shopping on the outside aisles of the store, which tend to contain more fresh foods and lower calorie foods. The inside aisles tend to have more processed foods.  Stick to your list. Avoid buying unhealthy items just because they are on sale.   Compare nutrition labels to check the number of calories and percentage of fat.      What to buy:    Vegetables  Fresh vegetables  Frozen vegetables with no sauce or added salt  Canned vegetables with no sauce or added salt    Protein  Lean meats, such as chicken and turkey  Limit red meats, such as beef to no more than 1x/week  Limit processed meats, such as cold cuts, victoria, sausage, and hot dogs. Look for brands that have no nitrites and are minimally processed. Consider turkey sausage or turkey victoria.  Fish and Shellfish  Eggs  Dried beans  Canned beans (reduced sodium)    Fat  Use healthy oils, such as olive oil or canola oil, for cooking, salad dressings, etc.  Unflavored nuts and seeds  Nut butters (no added sugar)    Dairy  Yogurt (no sugar added)  Cheese  Low-fat milk  Unsweetened nondairy milks (almond milk, soy milk, etc)    Fruit  Fresh Fruit  Frozen fruit with no added sugar  Canned fruit with no added sugar  Dried fruit with no added sugar  100% fruit juice    Whole Grains  Single ingredient grains, such as oats, quinoa, brown rice  Whole-wheat pasta  Sprouted whole-grain bread    What to avoid:  - Avoid fried foods  - Avoid foods with added sugar  - Avoid sugar-sweetened beverages  - Avoid ultra-processed foods      SEATED RESISTANCE BAND EXERCISES    If you do not have a resistance band, or do not feel  comfortable using a resistance band, these exercises can also be done holding a light hand weight or water bottle.  If you are just starting to exercise, you may want to go through the motions without any weights or resistance till you become comfortable with the movements.    Do each of the movements shown 10 times (10 repetitions). You can repeat the exercises a second or  third time as well for greater benefit. The amount of tension on the resistance bands should be adjusted so  you can complete one set of 10 repetitions with effort. Increase the tension every few weeks. Do these  exercises 2 or 3 times a week.    * If an exercise hurts your back or joints, stop doing that particular exercise, but keep doing all the others *      CHEST EXERCISE        Start Position:   Sit tall, with feet shoulder width apart and feet in front of knees.   Belly pulled in.   Place the band around your upper back, grasp band in each hand, knuckles (rings) facing front. Arms  should be bent so that knuckles are in front of elbows   Slide shoulder blades down your back and slightly together (as if making a V).   Relax your neck.    To Perform This Exercise:   Press hands forward to lengthen arms using chest muscles (not arms!).   Dont arch your back!)   Return to start position and repeat 10 times.   Breathe!        BACK EXERCISE        Start Position:   Sit tall, with feet shoulder width apart and feet in front of knees.   Belly pulled in.   Grasp band in each hand and raise overhead. Arms should be slightly wider than shoulder width and no  slack in the band.   Slide shoulder blades down your back and slightly together (as if making a V).   Relax your neck.    To Perform This Exercise:   Open arms pulling down towards chest using upper back muscles (not arms!).   Squeeze through shoulder blades at the bottom of the movement (dont arch your back!).   Return to start position and repeat 10 times.   Breathe!        SHOULDER  EXERCISE        Start Position:   Sit tall, with feet shoulder width apart and feet in front of knees.   Belly pulled in.   Sit on band so that you can grasp band in one hand with tension on the band, but not so much tension that  you cannot straighten the arm. It may take a few tries to find the right amount of tension.   Slide shoulder blades down your back and slightly together (as if making a V).   Relax your neck.    To Perform This Exercise:   Press fist to the ceiling, slightly in front of the body.   SLOWLY return to start position and repeat 10 times.   Switch sides and repeat on the other side.   Breathe!        TRICEPS EXERCISE        Start Position:   Sit tall, with feet shoulder width apart and feet in front of knees.   Belly pulled in.   Sit on one end of the band. Grasp other end of band in one hand.   Point elbow directly toward the ceiling (if this is difficult, you may support the upper arm with the opposite  hand)   Be sure there is no slack in the band in the starting position.   Slide shoulder blades down your back and slightly together (as if making a V).   Relax your neck.    To Perform This Exercise:   Extend your arm up to the ceiling, as shown.   Squeeze through shoulder blades at the bottom of the movement (dont arch your back!).   SLOWLY return to start position and repeat 10 times.   Repeat on the other side.   Breathe!        BICEP EXERCISE        Start Position:   Sit tall, with feet shoulder width apart and feet in front of knees.   Belly pulled in.   Place center of exercise band under one foot and step the end of the band under the other foot.   Grasp each end of the band with one hand. Make sure there is no slack between your foot and the hand  that holds the band.   Hold your elbows to your sides.   Pull abdominals in, lift chest, press shoulders down and back.    To Perform This Exercise:   As you curl up, keep your wrist from changing position in relation to your forearm  and your arm stable  from the shoulder to the elbow.   Bend and straighten your elbow in a slow and controlled movement. Repeat this motion 10 times.   Repeat on the other side.   Breathe!

## 2022-08-22 NOTE — PROGRESS NOTES
"Subjective:       Patient ID: Amna Chawla is a 57 y.o. female.    Chief Complaint: Consult and Obesity    Patient presents for treatment of obesity.     Co-morbidities:   HTN  HLD  Prediabetes - has tried metformin but caused stomach upset  GERD  SHIRA  Migraines - topiramate didn't work for HA  H/o DVT, CVA      Current Physical Activity  No regular exercise routine  "constantly moving"    Current Eating Habits  Can't eat till after 11am because she gets sick  Will eat some yogurt, sometime with fruit and granola  Banana or smoothie or pretzels or banana chips as snack  Meat (mostly chicken or turkey, rarely steak or pork chops) and vegetable (brussel sprouts, asparagus), sometimes with rice or pasta; salad; Popeyes  Beverages: mostly water; soft drinks about once a week; sweet tea or alberto aid every now and then; red wine about 2x/month socially    Medical Weight Loss - no InBody due to pacemaker  8/22/2022: 310 lbs 13.6 oz, BMI 52.23    Review of Systems   Constitutional: Negative for chills and fever.   Respiratory: Negative for shortness of breath.    Cardiovascular: Negative for chest pain and palpitations.   Gastrointestinal: Negative for abdominal pain, nausea and vomiting.   Neurological: Negative for dizziness and light-headedness.   Psychiatric/Behavioral: The patient is not nervous/anxious.          Objective:        Latest Reference Range & Units 05/17/22 14:33 05/17/22 20:31   WBC 3.90 - 12.70 K/uL 4.21    RBC 4.00 - 5.40 M/uL 3.95 (L)    Hemoglobin 12.0 - 16.0 g/dL 11.7 (L)    Hematocrit 37.0 - 48.5 % 35.7 (L)    MCV 82 - 98 fL 90    MCH 27.0 - 31.0 pg 29.6    MCHC 32.0 - 36.0 g/dL 32.8    RDW 11.5 - 14.5 % 15.9 (H)    Platelets 150 - 450 K/uL 250    MPV 9.2 - 12.9 fL 11.2    Gran % 38.0 - 73.0 % 53.2    Lymph % 18.0 - 48.0 % 33.3    Mono % 4.0 - 15.0 % 13.1    Eosinophil % 0.0 - 8.0 % 0.0    Basophil % 0.0 - 1.9 % 0.2    Immature Granulocytes 0.0 - 0.5 % 0.2    Gran # (ANC) 1.8 - 7.7 K/uL 2.2  "   Lymph # 1.0 - 4.8 K/uL 1.4    Mono # 0.3 - 1.0 K/uL 0.6    Eos # 0.0 - 0.5 K/uL 0.0    Baso # 0.00 - 0.20 K/uL 0.01    Immature Grans (Abs) 0.00 - 0.04 K/uL 0.01    nRBC 0 /100 WBC 0    Differential Method  Automated    Protime 9.0 - 12.5 sec 13.4 (H)    INR 0.8 - 1.2  1.3 (H)    Sodium 136 - 145 mmol/L 135 (L) 132 (L)   Potassium 3.5 - 5.1 mmol/L 6.1 (H) 4.7   Chloride 95 - 110 mmol/L 106 105   CO2 23 - 29 mmol/L 24 21 (L)   Anion Gap 8 - 16 mmol/L 5 (L) 6 (L)   BUN 6 - 20 mg/dL 8 8   Creatinine 0.5 - 1.4 mg/dL 0.9 0.8   eGFR if non African American >60 mL/min/1.73 m^2 >60.0 >60.0   eGFR if African American >60 mL/min/1.73 m^2 >60.0 >60.0   Glucose 70 - 110 mg/dL 90 92   Calcium 8.7 - 10.5 mg/dL 9.1 9.0   Alkaline Phosphatase 55 - 135 U/L 66    PROTEIN TOTAL 6.0 - 8.4 g/dL 11.4 (H)    Albumin 3.5 - 5.2 g/dL 3.3 (L)    BILIRUBIN TOTAL 0.1 - 1.0 mg/dL 0.3    AST 10 - 40 U/L 27    ALT 10 - 44 U/L 21    Cholesterol 120 - 199 mg/dL 126    HDL 40 - 75 mg/dL 38 (L)    HDL/Cholesterol Ratio 20.0 - 50.0 % 30.2    LDL Cholesterol External 63.0 - 159.0 mg/dL 73.0    Non-HDL Cholesterol mg/dL 88    Total Cholesterol/HDL Ratio 2.0 - 5.0  3.3    Triglycerides 30 - 150 mg/dL 75    Troponin I 0.000 - 0.026 ng/mL 0.006    TSH 0.400 - 4.000 uIU/mL 1.185    (L): Data is abnormally low  (H): Data is abnormally high    Vitals:    08/22/22 1320   BP: 126/72   Pulse: 73       Physical Exam  Vitals reviewed.   Constitutional:       General: She is not in acute distress.     Appearance: Normal appearance. She is obese. She is not ill-appearing, toxic-appearing or diaphoretic.   HENT:      Head: Normocephalic and atraumatic.   Cardiovascular:      Rate and Rhythm: Normal rate.   Pulmonary:      Effort: Pulmonary effort is normal. No respiratory distress.   Skin:     General: Skin is warm and dry.   Neurological:      Mental Status: She is alert and oriented to person, place, and time.   Psychiatric:         Mood and Affect: Mood normal.          Behavior: Behavior normal.         Assessment:       Problem List Items Addressed This Visit     Mixed hyperlipidemia    GERD (gastroesophageal reflux disease)    Essential hypertension    Obstructive sleep apnea    Prediabetes    Class 3 severe obesity due to excess calories with serious comorbidity and body mass index (BMI) of 50.0 to 59.9 in adult - Primary      Other Visit Diagnoses     Encounter for weight loss counseling              Plan:   - No medications prescribed    - Log all food and beverage intake with a daily calorie goal of 9007-9653 calories per day    - Seated resistance band exercises given in AVS

## 2022-08-23 PROBLEM — E66.813 CLASS 3 SEVERE OBESITY DUE TO EXCESS CALORIES WITH SERIOUS COMORBIDITY AND BODY MASS INDEX (BMI) OF 50.0 TO 59.9 IN ADULT: Status: ACTIVE | Noted: 2022-02-15

## 2022-08-25 ENCOUNTER — PROCEDURE VISIT (OUTPATIENT)
Dept: NEUROLOGY | Facility: CLINIC | Age: 57
End: 2022-08-25
Payer: MEDICARE

## 2022-08-25 DIAGNOSIS — E53.8 B12 DEFICIENCY: ICD-10-CM

## 2022-08-25 DIAGNOSIS — R53.82 CHRONIC FATIGUE: ICD-10-CM

## 2022-08-25 DIAGNOSIS — G43.711 CHRONIC MIGRAINE WITHOUT AURA, WITH INTRACTABLE MIGRAINE, SO STATED, WITH STATUS MIGRAINOSUS: Primary | ICD-10-CM

## 2022-08-25 PROCEDURE — 64615 PR CHEMODENERVATION OF MUSCLE FOR CHRONIC MIGRAINE: ICD-10-PCS | Mod: S$PBB,,, | Performed by: PSYCHIATRY & NEUROLOGY

## 2022-08-25 PROCEDURE — 64615 CHEMODENERV MUSC MIGRAINE: CPT | Mod: PBBFAC | Performed by: PSYCHIATRY & NEUROLOGY

## 2022-08-25 PROCEDURE — 99499 NO LOS: ICD-10-PCS | Mod: S$PBB,,, | Performed by: PSYCHIATRY & NEUROLOGY

## 2022-08-25 PROCEDURE — 96372 THER/PROPH/DIAG INJ SC/IM: CPT | Mod: PBBFAC

## 2022-08-25 PROCEDURE — 64615 CHEMODENERV MUSC MIGRAINE: CPT | Mod: S$PBB,,, | Performed by: PSYCHIATRY & NEUROLOGY

## 2022-08-25 PROCEDURE — 99499 UNLISTED E&M SERVICE: CPT | Mod: S$PBB,,, | Performed by: PSYCHIATRY & NEUROLOGY

## 2022-08-25 RX ORDER — CYANOCOBALAMIN 1000 UG/ML
1000 INJECTION, SOLUTION INTRAMUSCULAR; SUBCUTANEOUS ONCE
Status: COMPLETED | OUTPATIENT
Start: 2022-08-25 | End: 2022-08-25

## 2022-08-25 RX ADMIN — ONABOTULINUMTOXINA 200 UNITS: 100 INJECTION, POWDER, LYOPHILIZED, FOR SOLUTION INTRADERMAL; INTRAMUSCULAR at 10:08

## 2022-08-25 RX ADMIN — CYANOCOBALAMIN 1000 MCG: 1000 INJECTION, SOLUTION INTRAMUSCULAR at 10:08

## 2022-08-25 NOTE — PROCEDURES
Follow up:  Reports overall worse HA   Feels tremors in whole body lasting 3-4 hrs   Tried ativan - helped for a few hours     Prior note:   She is grappling with grief of loss of her sister   Pending grief counseling      Prior note:   S/p course of prednisone for GI allergic reaction (scratch throat - seafood related)     Prior note:   Migraine Hz increased during storm - almost daily   One episode of lightheadedness. No SOB, CP       Prior note:   Reports episode of lightheadedness 2 days ago      Prior note:   S/p COVID vaccine      Prior note:   Reports more physical fatigue   taking 2 naps a day   L sided severe migraine lasted 2 days. Took ubrelvy, motrin, zanaflex      Prior note:   3 days of severe L sided HA + nausea   Gradual onset   Ubrelvy, zanaflex, flurbiprofen - not helping   Vision - nml      Prior note:   HA stable   Try Ubrelvy again      Prior note:   independent left and right parietal ice prick HA      Prior note:   HA much improved   Only 2 since last visit      Prior note:   HA are more frequent   Taking 1600 mg motrin + zanaflex   Went to ER recently      Prior note:   HA overall stable in Hz and intensity   Reports a wearing off effect of BOTOX since last week   Taking zanaflex 8 mg TID        Prior note:   HA started 12/24   She feels BOTOX wore off last week   Reports GI distress from taking to many motrin      Prior note:   4/week HA - L vertex   Jabs - vertex either sides      She reports migraine that woke her up in the morning - associated with L side facial droop      Prior note:   She gets acceptable relief for 2.5 months after BOTOX      Prior note:   No recurrent stroke symptoms   starting having whole body tremors since this AM. Took 1 tablet of lorazepam   Last night had a HA, took trazodone       Admit Date: 4/11/2018  2:03 PM   Discharge Date and Time: 4/12/2018  8:30 PM   History of Present Illness: Ms. Chawla is a 53 yo F with afib on Eliquis (last dose this am), prior  cardiac arrest with associated acute ischemic stroke with residual mild L sided weakness, CAD with ICD in situ, HTN, and HLD who presented with acute R sided weakness and sensation changes.  She originally called EMS for palpitations, but developed acute right sided facial droop and RUE weakness at 1:40pm today, after EMS had arrived.  She denies changes to speech or vision.  SBP en route 200/100.  She is ineligible for tPA 2/2 Eliquis use.  She is not suspected to have an LVO.  She will be admitted to  for observation overnight.     Hospital Course (synopsis of major diagnoses, care, treatment, and services provided during the course of the hospital stay): Ms. Chawla was admitted for acute stroke workup. Pt with pacemaker so unable to obtain MRI Brain; CTA H/N demonstrated no evidence acute infarction, though did exhibit noncalcified plaquing of carotid bifurcations and proximal ICAs with < 50% stenosis, so Plavix was added to pt's daily home regimen. Concerned for TIA at this time though Migraine very high on differential due to pt's hx headaches, including presently this admission. Hypertensive urgency/emergency also considered due to pt's very elevated levels with EMS. Per chart review, Eliquis was a new medication since 4/3/18 (had switched from Coumadin), however staff Faraz concerned that pt had a potential event while on Eliquis. She wished to switch to Pradaxa as an alternative DOAC (as it has an option for reversal), however changed to Xarelto per pt's insurance coverage.   While admitted, pt given cocktail for HA which she has received previously at the infusion clinic - IV Magnesium, IM Toradol, 2mg IV Morphine, 500cc NS bolus (IV Benadryl not included this time as pt had received a dose earlier that day prior to contrast imaging.) HA improved somewhat and pt was encouraged to follow up with Dr. Cortez for continued management of botox injections, etc. At that time, pt also found with hyponatremia;  d/c'd Clorthalidone and plan was made for pt to [facility]16[facility]18 for repeat CMP to evaluate Na level and BP check since discontinuing this medication.  Ms. Chawla was evaluated and treated by PT, OT, and SLP who recommend d/c with outpatient PT and OT. She was discharged home 4/12/18 with [facility] follow-up Monday      Prior note:   2 weeks ago BOTOX effect wore off   Used up all her percocet   3 wks h/o:   Reports frequent falls - falling forward, no LOC, no injuries, able to protect falls by hands   triggers - unknown   Also occ stumbling   Dragging L foot   No Pain in back or legs   No numbness or weakness in legs   No B/B incontinence   No lightheadedness      Prior note:    Flare up of TMJ and HA during daughter's wedding   NSAIDS helped   2 weeks ago BOTOX effect wore off      Prior note:   2 weeks ago BOTOX effect wore off   Has severe spasm and pain in the traps catalina   Weather change has provoked severe migraine + nausea   She started coumadin       Prior note:   3 weeks ago BOTOX effect wore off   She reports severe daily HA    During BOTOX periods she feels she  her HA. Much less intense      Prior note:   The patient is a 51-year-old female who [facility]. Since her last visit, she reports growing frustration   about the fact that nothing has helped her with regards to her headaches. She   continues to experience daily headaches. She takes mefenamic acid and   tizanidine every night, which only provides her two hours of relief. She is   unable to sleep because of the refractory headaches. She is incapacitated   because of severe headaches. She reports minimal relief with infusions that she  gets on a periodic basis. She does report an improvement overall with the   Botox. However, this effect has worn off in the last two weeks. She also   reports an episode wherein she fell with loss of consciousness, which  "was not   provoked. As a result, she injured her ankle and is currently wearing a boot.      Prior note:   since last week - Reports vertigo for 6 - 7 minutes upto 3 times daily   Nearly daily severe HA - no response to ponstel , compazine   She is late for her BOTOX - last 2 weeks were painful       Follow up:   R sided Headache is constant max at TMJ on R   Prior note:   She reports new episodes of R face tingling. Sh also reports 2 episodes of blurred vision lasting 15 minutes. These hapended while watching TV. Her pain remains unchanged   Follow up:   2 days of severe HA during Thanksgiving   Pounding bifrontal region   Pain worse over L eye brow   Follow up:   Reports bifrontal severe HA (worse on L) with no nausea with sudden onset . Occurs daily   NO note:  I found no papilledema or other changes to suggest elevated intracranial pressure or other alternative etiology for her headaches. Repeat exam in two years.  HA are less frequent and less intense.   (R levator scapula spasm)   symptoms better with lateral flexion to L   Prior note:   She still has daily HA with severe sharp stabbing pain behind L eye lasting for 15 sec   Prior note:   Daily pain. 2/week - nausea.  Shu Lares RN at 9/17/2014 4:53 PM  Pt presented to clinic for medical advice. Pt is seen by Dr. Paredes and Dr. Cortez.  1) She went ER on 9/13/14 for a migraine. She was given Reglan, Benadryl, MSO4 and IVF. A couple days later she developed an all over body "tremor". She states "I think I have Parkinson's". - Per Dr. Paredes, medication related tremor (Reglan & Benadryl). Informed her it should wear off in a few days. If not, she is to call and let us know.  2) Headaches - triggered by insomnia.   Current meds:  Ketoprofen - she took it once and said her "throat closed up" and she's not supposed to have anything with aspirin in it.  Nortriptyline - she was taking it at night, but it didn't help her sleep, so she stopped it.  Per  " "Cortez, discontinue Ketoprofen and Nortriptyline. Start Effexor XR 37.5mg QD, tizanidine 4mg BID PRN headache and Klonopin 0.25 - 0.5mg QHS PRN. Will schedule pt for sphenocath procedure within the next week.   Prior note:   48 yo woman with history of HTN and asthma, both reportedly controlled, in hospital early 2013 after "passed out" and became unresponsive. CPR in the field for 8 minutes until EMS arrived. Per ED note, on arrival she was found to be in PEA, given epinephrine. Vfib/Vtach was achieved which was shocked x1. Patient converted to sinus tachycardia after shock. I do not know the time course for the above treatment. On arrival to facility patient was in sinus tachycardia with strong pulses, intubated and unresponsive. ET tube placement was confirmed with direct laryngoscopy and good bilateral breath sounds were heard after the tube was pulled back 2 cm. Reported to have "withdrawal" movements in ER during stabilization, then sedated and cooling protocol initiated. Had remarkable   recovery and first seen in clinic in April 2013.   Headache history: cluster   Age of onset - 2012   Location - behind L eye   Nature of pain - sharp   Prodrome - no   Aura - No   Duration of headache - 6-7 min   Time to peak intensity -   Pain scale - range of intensity - 9/10   Frequency - daily   Status Migrainosus history - 1-3 days   Time of day of most headaches- anytime   Associated symptoms with the headache:   Red eyes   swelling of L face   Headache history: Migraine   Age of onset - 2012   Location - bifrontal   Nature of pain - Pounding   Prodrome - no   Aura - No   Duration of headache - > 4 hrs   Time to peak intensity -   Pain scale - range of intensity - 9/10   Frequency - 5/week   Status Migrainosus history - 1-3 days   Time of day of most headaches- anytime   Associated symptoms with the headache:   Meningeal symptoms - photophobia, phonophobia, exercise intolerance +   Nausea/vomitting +   Nasal drainage " "  Visual blurriness +   Pallor/flushing   Dizziness   Vertigo   Confusion   Impaired concentration +   Pain worsened with physical activity +   Neck pain +   Symptoms of increased intracranial pressure:   Whooshing sounds - no   Visual spots/blotches - no   Headache Triggers: unknown   Other comorbid conditions:   Anxiety - no   Motion sickness symptom - no       Treatment history:   Resolution of headache with sleep - yes       Meds tried:   Trigger points involvin/9/15 helped for 1 day   Site: Right   Levator scapula   Pharmacological agent:   0.5% Sensorcaine solution   No complications were noted.  Supraorbital block - helped for 2 days   Tylenol - not help   imitrex - chest tightness   alleve - help   topamax - not helping   Neurontin - not helping   Paxil, Elavil - not help   seroquel - nightmare   Ketoprofen - she took it once and said her "throat closed up" and she's not supposed to have anything with aspirin in it.  Flurbiprofen - constipation   Nortriptyline - she was taking it at night, but it didn't help her sleep, so she stopped it.  reglan - parkinsonism   zanaflex - help   Toradol 30 mg IM inj at home - too expensive   BOTOX round #1 9/3/15 - helped (R levator scapula spasm)   Round #2 12/3/15 - helped   Round#3 3/316 - helped   Round #4 16 - helped   BOTOX#5 9/15/16 - helped     BOTOX#6 - 16 - helped   BOTOX#7 - 3/9/17 - helped    BOTOX#8 - 17 - helped    BOTOX#9 8/10/17 - helped   BOTOX#10 10/19/17 - helped   BOTOX#11 17 - helped   BOTOX#12 3/7/18 - helped   BOTOX#13 18 - helped    BOTOX#14 18 - helped     BOTOX#15 10/19/18 - helped      BOTOX#16 18 - helped   BOTOX#17 3/13/19 - helped    BOTOX#18 19 - helped     BOTOX#19 19 - helped      BOTOX#20 10/11/19 - helped     BOTOX#21 19 - helped   BOTOX#22 3/10/20 - helped    BOTOX#23 20 - helped   BOTOX#24 20 - helped  BOTOX#25 21 - helped   BOTOX#26 21 - " helped   BOTOX#27 7/6/21 - helped    BOTOX#28 12/10/21 - helped     BOTOX#29 5/19/22 - helped     AIMOVIG (sept 1rst week, Oct first week, Nov first wk 140 mg, Dec first wk 140 mg, Jan, Feb) no benefit   Constipation +      Can not do RFA - pt has pacemaker   Procedure: IOVERA peripheral nerve focus cold therapy (non ablative cryotherapy) 12/30/15 - not help   Indication: Cryotherapy of select peripheral nerves of the head was performed to treat chronic headaches that failed to respond to several preventive pharmacological therapies.   Sedation: None   Informed consent: The procedure, risks, benefits and options were discussed with the patient. There are no contraindications to the procedure. The patient expressed understanding and agreed to the procedure. I verify that I personally obtained the patient's consent prior to the start of the procedure.  Location:  Bilateral Supra orbital nerve (3 cycles on R and 1 cycle on L)   Bilateral Occipital nerve   3 cycles   Pain relief: Pain score reduced 10/10->0/10   9/26 Bilateral Sphenopalatine Ganglion and bilateral Maxillary Branch (Trigeminal Nerve) Block - no help   ONB - not help     georges Pagan RN at 12/31/2014 3:54 PM                           Status: Signed                                       Expand All Collapse All   HEADACHE FASTTRACK  PAIN 7/10 NAUSEA 0/10  ROUND 1: TORADOL, IMITREX, MORPHINE, BENADRYL, AND MAGNESIUM  PAIN 7/10 NAUSEA 0/10  PT STATED SHE WAS READY TO GO HOME AND GO TO SLEEP. PT D/C'ED IN [facility]                              Shannon Pagan RN at 10/5/2015 12:49 PM                           Status: Signed                                       Expand All Collapse All   HEADACHE FASTTRACK  PAIN 8/10 NAUSEA 10/10  FIRST ROUND: tORADOL, BENADRYL, COMPAZINE, IMITREX, MAGNESIUM, AND VALPROATE.  PAIN 1/10 NAUSEA 0/10  PT READY TO GO HOME AND FEELING BETTER. PT LEFT IN NAD WITH FAMILY MEMBER  TOTAL TIME: 2442-1181                         Shannon Pagan RN at  10/20/2015 3:05 PM                           Status: Signed                                       Expand All Collapse All   HEADACHE FASTTRACK  PAIN 10/10 NAUSEA 0/10  FIRST ROUND: tORADOL, BENADRYL, COMPAZINE, IMITREX, MAGNESIUM, AND VALPROATE.  BP BEING MONITORED EVERY 15 MIN AND REMAINED 170'S/80-90'S. DR CHOPRA NOTIFIED AND STATED TO MAKE SURE BP DID NOT EXCEED 180 SYSTOLIC OR PT SHOULD REPORT TO ED. BP AT 1500 183/92. DR CHOPRA NOTIFIED AND GAVE ORDER TO STOP INFUSION AND SEND PATIENT TO ED. PT BROUGHT TO ED BY INFUSION NURSE VIA WHEELCHAIR.   PAIN 8/10 NAUSEA 0/10  TOTAL TIME: 6736-4395                                                     Progress Notes                                               Shannon Pagan RN at 2/24/2016 1:36 PM       Status: Signed                  Expand All Collapse All   HEADACHE FASTTRACK  PAIN 10/10 NAUSEA 7/10  FIRST ROUND: tORADOL, BENADRYL, AND MORPHINE-BP RANGING FROM 177-203/84-92, HR 60-82  MD NOTIFIED AND GAVE ORDERS TO SEND TO ED. PT LEFT VIA W/C WITH INFUSION NURSE ON WAY TO ED.            Headache risk factors:   H/o TBI - [facility] (2013) + neck sprain   H/o Meningitis - no   F/h Aneurysms - no       Review of Systems   Constitutional: Negative.   HENT: Negative.   Eyes: Negative.   Respiratory: Negative.   Cardiovascular: Negative.   Gastrointestinal: Negative.   Endocrine: Negative.   Genitourinary: Negative.   Musculoskeletal: Negative.   Skin: Negative.   Allergic/Immunologic: Negative.   Hematological: Negative.   Psychiatric/Behavioral: insomnia      Objective:     Physical Exam   Constitutional: She is oriented to person, place, and time. She appears well-developed.   obesity   Psychiatric: She has a normal mood and flat affect. Her behavior is normal. Judgment and thought content normal.   Headache Exam:  Optic disc is flat OU   Temples appear symmetric  No V1,V2,V3 distribution allodynia/hyperalgesia catalina   No facial swelling  No gross TMJ abnormalities   No ptosis   No  conj injection   No meningismus   No neck stiffness  No C spine tenderness  Cervical facet tenderness on palpation catalina   No tender points over trapezium   catalina supraorbital nerve exit point tenderness  catalina occipital nerve exit point tenderness  No auriculotemporal nerve tenderness   Tenderness of levator scapula catalina    Gait - wide based gait                       Assessment:                              1.  Occipital neuralgia                              2.  Cervicalgia                              3.  4  5  6 Chronic migraine without aura, with intractable migraine, so stated, with status migrainosus (post traumatic) post concussive?  Depression   TMJ - R sided   Insomnia - SHIRA      Head CT  Findings: There is no evidence of acute or recent major vascular territory cerebral infarction, parenchymal hemorrhage, or intra-axial mass.  There are no extra-axial masses or abnormal fluid collections.  The ventricular system is within normal limits of size for age and shows no distortion by mass-effect or evidence of hydrocephalus.  There is no fracture or destructive osseous lesion.  The extracranial soft tissues are unremarkable.  The visualized paranasal sinuses and mastoid air cells are clear.   Impression    No acute intracranial abnormality.  ______________________________________     Electronically signed by resident: KRISSY MAYERS MD  Date: 03/14/16  Time: 17:09            Plan:                              1. Prophylactic medication -   Despiramine 25 mg AM (for dizziness) PRN  zoloft 25 mg daily    Aricept 20 mg daily   Neurontin 900 mg TID    Magnesium 200 mg daily  Topamax 200 mg BID   Clonazepam BID for anxiety PRN  On trazodone   Cont B2, B6 supplements  On bellsomra    2, Breakthrough headache - zanaflex 8 mg Q8, etodolac  Multiple alternative treatment options were offered to the patient   3. Refrain from over counter pain medications. Discussed medication overuse headache.cont Magnesium 400 mg PO BID   4.  Occipital neuralgia - If medical management is ineffective may consider occipital nerve blocks.   5. I again urged the patient to keep a headache calender.    f/up Dr. Rock for TBI   Vit D supplements    f/up sleep clinic - CPAP - waiting for new machine      B12 1000 mcg x 1 today     Cont AJOVY (April 2019- ) - gap Feb-April 2021)  No side effects      ONB 2 weeks prior to next BOTOX       BOTOX was performed as an indicated therapy for intractable chronic migraine headaches given that the patient failed > 5 prophylactic medications  Botulinum Toxin Injection Procedure   Pre-operative diagnosis: Chronic migraine   Post-operative diagnosis: Same   Procedure: Chemical neurolysis   After risks and benefits were explained including bleeding, infection, worsening of pain, damage to the areas being injected, weakness of muscles, loss of muscle control, dysphagia if injecting the head or neck, facial droop if injecting the facial area, painful injection, allergic or other reaction to the medications being injected, and the failure of the procedure to help the problem, a signed consent was obtained.   The patient was placed in a comfortable area and the sites to be treated were identified.The area to be treated was prepped three times with alcohol and the alcohol allowed to dry. Next, a 30 gauge needle was used to inject the medication in the area to be treated.   Area(s) injected:   Total Botox used: 165 Units   Botox wastage: 35 Units   Injection sites:     muscle bilaterally ( a total of 10 units divided into 2 sites)   Procerus muscle (5 units)   Frontalis muscle bilaterally (a total of 20 units divided into 4 sites)   Temporalis muscle bilaterally (a total of 50 units divided into 8 sites)   Occipitalis muscle bilaterally (a total of 30 units divided into 6 sites)   Cervical paraspinal muscles (a total of 20 units divided into 4 sites)   Trapezius muscle bilaterally (a total of 30 units divided into 6  sites)   Masseter 5 units catalina      Complications: none       RTC for the next Botox injection: 10 weeks

## 2022-08-31 ENCOUNTER — EXTERNAL CHRONIC CARE MANAGEMENT (OUTPATIENT)
Dept: PRIMARY CARE CLINIC | Facility: CLINIC | Age: 57
End: 2022-08-31
Payer: MEDICARE

## 2022-08-31 PROCEDURE — 99490 PR CHRONIC CARE MGMT, 1ST 20 MIN: ICD-10-PCS | Mod: S$PBB,,, | Performed by: INTERNAL MEDICINE

## 2022-08-31 PROCEDURE — 99490 CHRNC CARE MGMT STAFF 1ST 20: CPT | Mod: PBBFAC | Performed by: INTERNAL MEDICINE

## 2022-08-31 PROCEDURE — 99490 CHRNC CARE MGMT STAFF 1ST 20: CPT | Mod: S$PBB,,, | Performed by: INTERNAL MEDICINE
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
## 2022-09-23 ENCOUNTER — OFFICE VISIT (OUTPATIENT)
Dept: ELECTROPHYSIOLOGY | Facility: CLINIC | Age: 57
End: 2022-09-23
Payer: MEDICARE

## 2022-09-23 ENCOUNTER — CLINICAL SUPPORT (OUTPATIENT)
Dept: CARDIOLOGY | Facility: HOSPITAL | Age: 57
End: 2022-09-23
Attending: INTERNAL MEDICINE
Payer: MEDICARE

## 2022-09-23 ENCOUNTER — HOSPITAL ENCOUNTER (OUTPATIENT)
Dept: CARDIOLOGY | Facility: CLINIC | Age: 57
Discharge: HOME | End: 2022-09-23
Payer: MEDICARE

## 2022-09-23 VITALS
BODY MASS INDEX: 50.02 KG/M2 | HEIGHT: 64 IN | DIASTOLIC BLOOD PRESSURE: 58 MMHG | SYSTOLIC BLOOD PRESSURE: 124 MMHG | HEART RATE: 63 BPM | WEIGHT: 293 LBS

## 2022-09-23 DIAGNOSIS — I44.2 COMPLETE AV BLOCK: ICD-10-CM

## 2022-09-23 DIAGNOSIS — Z95.810 BIVENTRICULAR AUTOMATIC IMPLANTABLE CARDIOVERTER DEFIBRILLATOR IN SITU: ICD-10-CM

## 2022-09-23 DIAGNOSIS — Z95.810 BIVENTRICULAR AUTOMATIC IMPLANTABLE CARDIOVERTER DEFIBRILLATOR IN SITU: Primary | ICD-10-CM

## 2022-09-23 DIAGNOSIS — I10 ESSENTIAL HYPERTENSION: ICD-10-CM

## 2022-09-23 DIAGNOSIS — G47.33 OBSTRUCTIVE SLEEP APNEA: ICD-10-CM

## 2022-09-23 DIAGNOSIS — Z79.01 LONG TERM (CURRENT) USE OF ANTICOAGULANTS: ICD-10-CM

## 2022-09-23 DIAGNOSIS — Z86.73 HISTORY OF TIA (TRANSIENT ISCHEMIC ATTACK): ICD-10-CM

## 2022-09-23 DIAGNOSIS — I48.0 PAROXYSMAL ATRIAL FIBRILLATION: ICD-10-CM

## 2022-09-23 DIAGNOSIS — I49.8 OTHER SPECIFIED CARDIAC ARRHYTHMIAS: Primary | ICD-10-CM

## 2022-09-23 DIAGNOSIS — Z86.74 HISTORY OF SUDDEN CARDIAC ARREST: ICD-10-CM

## 2022-09-23 PROCEDURE — 93005 ELECTROCARDIOGRAM TRACING: CPT | Mod: 59,PBBFAC | Performed by: INTERNAL MEDICINE

## 2022-09-23 PROCEDURE — 99214 PR OFFICE/OUTPT VISIT, EST, LEVL IV, 30-39 MIN: ICD-10-PCS | Mod: S$PBB,,, | Performed by: NURSE PRACTITIONER

## 2022-09-23 PROCEDURE — 99215 OFFICE O/P EST HI 40 MIN: CPT | Mod: PBBFAC | Performed by: NURSE PRACTITIONER

## 2022-09-23 PROCEDURE — 99999 PR PBB SHADOW E&M-EST. PATIENT-LVL V: ICD-10-PCS | Mod: PBBFAC,,, | Performed by: NURSE PRACTITIONER

## 2022-09-23 PROCEDURE — 99999 PR PBB SHADOW E&M-EST. PATIENT-LVL V: CPT | Mod: PBBFAC,,, | Performed by: NURSE PRACTITIONER

## 2022-09-23 PROCEDURE — 93284 PRGRMG EVAL IMPLANTABLE DFB: CPT | Mod: 26,,, | Performed by: INTERNAL MEDICINE

## 2022-09-23 PROCEDURE — 93010 ELECTROCARDIOGRAM REPORT: CPT | Mod: S$PBB,,, | Performed by: INTERNAL MEDICINE

## 2022-09-23 PROCEDURE — 93010 RHYTHM STRIP: ICD-10-PCS | Mod: S$PBB,,, | Performed by: INTERNAL MEDICINE

## 2022-09-23 PROCEDURE — 93284 CARDIAC DEVICE CHECK - IN CLINIC & HOSPITAL: ICD-10-PCS | Mod: 26,,, | Performed by: INTERNAL MEDICINE

## 2022-09-23 PROCEDURE — 99214 OFFICE O/P EST MOD 30 MIN: CPT | Mod: S$PBB,,, | Performed by: NURSE PRACTITIONER

## 2022-09-23 PROCEDURE — 93284 PRGRMG EVAL IMPLANTABLE DFB: CPT

## 2022-09-26 ENCOUNTER — TELEPHONE (OUTPATIENT)
Dept: NEUROLOGY | Facility: CLINIC | Age: 57
End: 2022-09-26
Payer: MEDICARE

## 2022-09-26 NOTE — TELEPHONE ENCOUNTER
----- Message from Peace Desai MA sent at 9/26/2022 10:14 AM CDT -----  Regarding: F/u appt  Contact: Halina Meade is calling to get pt scheduled for a follow up with Dr. Paredes. Please call pt to assist her with getting scheduled. Pt is okay with doing a virtual.          Confirmed Contact below:  Contact Name:Amna Chawla  Phone Number: [phone]

## 2022-09-27 NOTE — TELEPHONE ENCOUNTER
Chief Complaint: Tremor / possible cognitive changes       Interval History:   Hx:  - last call 7/26/22 updating on tremors and acute grief  - Grief has improved and functional on day to day.   - Coping is going well and now moving through this pain.   - confirmed that outreach from psych came following our last conversation as intended  - provided support for grief external to [facility], grief counselor and support groups  - established relationship with hospital , able to speak together about what she was experiencing at the time       Tremor:  -- since last call, full body tremor is persistent  - able to anticipate onset of episode, still begins in the hands and then travels through body from there    - responsive to the increased clonazepam 1 mg at time of last call, has tried lorazepam on hand in family to help quell tremors  --- as recently as this last Sunday  --- sufficient to bring the tremor under control  --- took Belsomra 10 mg and lorazepam 1 mg  - full body tremor preceded by language changes  --- clarifies that she means teeth chattering, difficulty controlling jaw in the moment      Mood:   - working through grief with various psych resources following outreach at time of last call  --- forwarded notes from last call to psych at last call  -    Cognition/Memory:  - some marked memory changes  --- the other day put a stick of butter in the cabinet instead of the fridge  - notes good days where she is active and mentally alert, engaged with crafts; others she reports being spotty, confused, and distractible starting multiple chores without fully completing   --- forgetting what steps she has completed or not  - daughter coming home in the evening and concerned by lapses and state of incomplete tasks that have been forgotten  --- daughter is working to confirm various tasks have associated alarms and confirming comprehension   - a few instances where she has caused the sink to overflow, but  generally terminates task fully when distracted though this may be changing  --- Amna doesn't cook anymore because of safety concerns related to the above (burning food or leaving stove on). Setting up pre-made meals with daughter for during the day etc.   - mentions she has received previous neuropsych testing w/ Dr. Rivas a number years ago       - - -  Scheduled for virtual visit with Dr. Paredes 10/13 at 1:40 PM. Inquiry regarding preemptive scheduling of neuropsych testing forthcoming.

## 2022-09-30 ENCOUNTER — EXTERNAL CHRONIC CARE MANAGEMENT (OUTPATIENT)
Dept: PRIMARY CARE CLINIC | Facility: CLINIC | Age: 57
End: 2022-09-30
Payer: MEDICARE

## 2022-09-30 PROCEDURE — 99489 CPLX CHRNC CARE EA ADDL 30: CPT | Mod: S$PBB,,, | Performed by: INTERNAL MEDICINE

## 2022-09-30 PROCEDURE — 99489 CPLX CHRNC CARE EA ADDL 30: CPT | Mod: PBBFAC | Performed by: INTERNAL MEDICINE

## 2022-09-30 PROCEDURE — 99489 PR COMPLX CHRON CARE MGMT, EA ADDTL 30 MIN, PER MONTH: ICD-10-PCS | Mod: S$PBB,,, | Performed by: INTERNAL MEDICINE

## 2022-09-30 PROCEDURE — 99487 CPLX CHRNC CARE 1ST 60 MIN: CPT | Mod: S$PBB,,, | Performed by: INTERNAL MEDICINE

## 2022-09-30 PROCEDURE — 99487 CPLX CHRNC CARE 1ST 60 MIN: CPT | Mod: PBBFAC | Performed by: INTERNAL MEDICINE

## 2022-09-30 PROCEDURE — 99487 PR COMPLX CHRON CARE MGMT, 1ST HR, PER MONTH: ICD-10-PCS | Mod: S$PBB,,, | Performed by: INTERNAL MEDICINE

## 2022-10-06 ENCOUNTER — PATIENT MESSAGE (OUTPATIENT)
Dept: BARIATRICS | Facility: CLINIC | Age: 57
End: 2022-10-06
Payer: MEDICARE

## 2022-10-09 ENCOUNTER — CLINICAL SUPPORT (OUTPATIENT)
Dept: CARDIOLOGY | Facility: HOSPITAL | Age: 57
End: 2022-10-09
Payer: MEDICARE

## 2022-10-09 DIAGNOSIS — Z95.810 PRESENCE OF AUTOMATIC (IMPLANTABLE) CARDIAC DEFIBRILLATOR: ICD-10-CM

## 2022-10-09 PROCEDURE — 93295 CARDIAC DEVICE CHECK - REMOTE: ICD-10-PCS | Mod: ,,, | Performed by: INTERNAL MEDICINE

## 2022-10-09 PROCEDURE — 93295 DEV INTERROG REMOTE 1/2/MLT: CPT | Mod: ,,, | Performed by: INTERNAL MEDICINE

## 2022-10-13 ENCOUNTER — OFFICE VISIT (OUTPATIENT)
Dept: NEUROLOGY | Facility: CLINIC | Age: 57
End: 2022-10-13
Payer: MEDICARE

## 2022-10-13 DIAGNOSIS — G25.9 FUNCTIONAL MOVEMENT DISORDER: ICD-10-CM

## 2022-10-13 DIAGNOSIS — R53.82 CHRONIC FATIGUE: Primary | ICD-10-CM

## 2022-10-13 PROCEDURE — 99213 OFFICE O/P EST LOW 20 MIN: CPT | Mod: 95,,, | Performed by: PSYCHIATRY & NEUROLOGY

## 2022-10-13 PROCEDURE — 99213 PR OFFICE/OUTPT VISIT, EST, LEVL III, 20-29 MIN: ICD-10-PCS | Mod: 95,,, | Performed by: PSYCHIATRY & NEUROLOGY

## 2022-10-13 NOTE — PROGRESS NOTES
Neurology Telemedicine Note     Name: Amna Chawla  MRN: [id number]   CSN: [id number]      Date: 10/13/2022    The patient location is: Home  The chief complaint leading to consultation is: fu  Visit type: Virtual visit with synchronous audio and video    TOTAL TIME SPENT: 18 min    Each patient to whom I provide medical services by telemedicine is:  (1) informed of the relationship between the physician and patient and the respective role of any other health care provider with respect to management of the patient; and (2) notified that they may decline to receive medical services by telemedicine and may withdraw from such care at any time.    History of Present Illness (HPI):       8 pain - HA  0 falls, but 5 stumbles or near misses      Interval History:   Hx:  - last seen 10/12/20, with interim calls to reestablish 7/26 and 9/26/22 with additions as below  - last call updating on tremors and acute grief since 7/26  - grief has improved and functional on day to day.   - coping is going well and now moving through this pain.   - confirmed that outreach from psych came following our last conversation as intended  - provided support for grief external to [facility], grief counselor and support groups  - established relationship with hospital , able to speak together about what she was experiencing at the time   - scheduled for new neuropsych testing with Dr. Rivas,  11/8/22 intake  - Daughter requesting a 4-prong cane for additional support      Tremor:  -- since last call, full body tremor is persistent  - able to anticipate onset of episode, still begins in the hands and then travels through body from there    - responsive to the increased clonazepam 1 mg at time of last call 7/26, has tried lorazepam on hand in family to help quell tremors  --- as recently as this last Sunday  --- sufficient to bring the tremor under control  --- took Belsomra 10 mg and lorazepam 1 mg  - full body tremor preceded by  language changes  --- clarifies that she means teeth chattering, difficulty controlling jaw in the moment      Mood:   - working through grief with various psych resources following outreach at time of last call  --- forwarded notes from last call to psych at last call  -     Cognition/Memory:  - some marked memory changes  --- the other day put a stick of butter in the cabinet instead of the fridge  - notes good days where she is active and mentally alert, engaged with crafts; others she reports being spotty, confused, and distractible starting multiple chores without fully completing   --- forgetting what steps she has completed or not  - daughter coming home in the evening and concerned by lapses and state of incomplete tasks that have been forgotten  --- daughter is working to confirm various tasks have associated alarms and confirming comprehension   - a few instances where she has caused the sink to overflow, but generally terminates task fully when distracted though this may be changing  --- Amna doesn't cook anymore because of safety concerns related to the above (burning food or leaving stove on). Setting up pre-made meals with daughter for during the day etc.   - mentions she has received previous neuropsych testing w/ Dr. Rivas a number years ago   - STM much impaired per Amna  --- today she notes that when is asked a question, if someone interrupts, she forgets the original question  --- rapid degradation of new memory or limited encoding from the outset  - able to recall recent interim calls, but not previous call in July  --- biographical recall for major events intact, events of summer, scheduling plans etc.    Nonmotor/Premotor ROS: as per HPI, and all other systems are negative    Past Medical History: The patient  has a past medical history of Anticoagulant long-term use, Anxiety, Asthma, Atrial fibrillation, Brain anoxic injury, Cervicalgia (8/28/2014), CHI (closed head injury) (2/19/2013),  Convulsion (5/30/2015), Decreased ROM of left shoulder (4/12/2017), Defibrillator activation (2013), Depression, Heart block, History of sudden cardiac arrest (2/2013), psychiatric care, Hypertension, Left atrial enlargement (4/11/2018), Pacemaker (2013), Paresthesia (11/1/2013), Prolonged Q-T interval on ECG (2/8/2013), Psychiatric problem, Seizures, Sleep difficulties, Stroke, Therapy, Thyroid disease, and Upper airway resistance syndrome (2/21/2017).    Social History: The patient  reports that she has never smoked. She has never used smokeless tobacco. She reports current alcohol use. She reports that she does not use drugs.    Family History: Their family history includes COPD in an other family member; Glaucoma in her maternal grandmother and paternal grandmother; Heart failure in an other family member; Hypertension in her mother.    Allergies: Aspirin, Imitrex [sumatriptan], Penicillins, Shellfish containing products, and Reglan [metoclopramide hcl]     Meds:   Current Outpatient Medications on File Prior to Visit   Medication Sig Dispense Refill    amLODIPine (NORVASC) 5 MG tablet Take 1 tablet (5 mg total) by mouth once daily. 90 each 3    ARIPiprazole (ABILIFY) 15 MG Tab Take 1 tablet (15 mg total) by mouth once daily. 90 tablet 1    carvediloL (COREG) 25 MG tablet TAKE 1 TABLET(25 MG) BY MOUTH TWICE DAILY WITH MEALS 60 tablet 11    clonazePAM (KLONOPIN) 1 MG tablet Take 1 tablet (1 mg total) by mouth daily as needed for Anxiety. 30 tablet 5    clopidogreL (PLAVIX) 75 mg tablet Take 1 tablet (75 mg total) by mouth once daily. 90 tablet 3    donepezil (ARICEPT) 10 MG tablet Take 1 tablet (10 mg total) by mouth 2 (two) times daily. 180 tablet 3    fremanezumab-vfrm (AJOVY) 225 mg/1.5 mL injection Inject 1.5 mLs (225 mg total) into the skin every 28 days. 1 each 12    gabapentin (NEURONTIN) 300 MG capsule Take 3 capsules (900 mg total) by mouth 3 (three) times daily. 810 capsule 12    hydrOXYzine HCL  (ATARAX) 25 MG tablet Take 1 tablet (25 mg total) by mouth 3 (three) times daily as needed for Itching. 20 tablet 0    losartan (COZAAR) 100 MG tablet Take 1 tablet (100 mg total) by mouth once daily. 90 tablet 3    omeprazole (PRILOSEC) 40 MG capsule Take 1 capsule (40 mg total) by mouth once daily. Take 30 minutes before breakfast 90 capsule 6    ondansetron (ZOFRAN-ODT) 4 MG TbDL Take 1 tablet (4 mg total) by mouth every 12 (twelve) hours as needed (nausea). 40 tablet 12    ondansetron (ZOFRAN-ODT) 4 MG TbDL Take 1 tablet (4 mg total) by mouth every 12 (twelve) hours as needed (nausea). 40 tablet 12    rivaroxaban (XARELTO) 20 mg Tab TAKE 1 TABLET BY MOUTH DAILY EVENING MEAL WITH DINNER 30 tablet 11    sertraline (ZOLOFT) 100 MG tablet Take 1.5 tablets (150 mg total) by mouth once daily. 135 tablet 3    suvorexant (BELSOMRA) 10 mg Tab Take 1 tablet by mouth nightly as needed (insomnia). 15 tablet 1    tiZANidine (ZANAFLEX) 4 MG tablet Take 1 tablet (4 mg total) by mouth every 6 (six) hours as needed. 90 tablet 12    ubrogepant (UBROGEPANT) 100 mg tablet Take 1 tablet (100 mg total) by mouth 2 (two) times daily as needed for Migraine. 10 tablet 12    acetaminophen (TYLENOL) 325 MG tablet Take 2 tablets (650 mg total) by mouth every 6 (six) hours as needed for Pain. 30 tablet 0    blood sugar diagnostic Strp 1 strip by Misc.(Non-Drug; Combo Route) route 2 (two) times daily. 200 strip 3    blood-glucose meter kit Please provide with insurance covered meter 1 each 0    cetirizine (ZYRTEC) 10 MG tablet Take 1 tablet (10 mg total) by mouth once daily. for 7 days 30 tablet 0    cyanocobalamin, vitamin B-12, 1,000 mcg Subl Place 1,000 mcg under the tongue once daily. 90 tablet 3    diclofenac sodium (VOLTAREN) 1 % Gel Apply 2 g topically 4 (four) times daily. 100 g 1    EPINEPHrine (EPIPEN) 0.3 mg/0.3 mL AtIn Inject 0.3 mLs (0.3 mg total) into the muscle once. for 1 dose 0.3 mL 0    etodolac (LODINE) 500 MG tablet Take 1  tablet (500 mg total) by mouth 2 (two) times daily as needed (migraine). 30 tablet 12    famotidine (PEPCID) 20 MG tablet Take 1 tablet (20 mg total) by mouth 2 (two) times daily. for 7 days 14 tablet 0    flurbiprofen (ANSAID) 100 MG tablet Take 1 tablet (100 mg total) by mouth 2 (two) times daily as needed (migraine). 12 tablet 12    furosemide (LASIX) 20 MG tablet Take 1 tablet (20 mg total) by mouth once daily. 30 tablet 6    lancets Misc 1 Device by Misc.(Non-Drug; Combo Route) route 2 (two) times daily. 200 each 3    magnesium 200 mg Tab Take 200 mg by mouth once daily. (Patient taking differently: Take 200 mg by mouth every other day.) 30 each 12    mupirocin (BACTROBAN) 2 % ointment Apply to affected area 3 times daily 22 g 1    rosuvastatin (CRESTOR) 40 MG Tab Take 1 tablet (40 mg total) by mouth every evening. 90 tablet 3    silver sulfADIAZINE 1% (SILVADENE) 1 % cream Apply topically 2 (two) times daily. 50 g 0    tiaGABine (GABITRIL) 4 MG tablet Take 1 tablet (4 mg total) by mouth every evening. 30 tablet 12    tiZANidine (ZANAFLEX) 4 MG tablet TAKE 1 TABLET(4 MG) BY MOUTH EVERY 6 HOURS AS NEEDED 90 tablet 12    tiZANidine (ZANAFLEX) 4 MG tablet Take 1 tablet (4 mg total) by mouth every 6 (six) hours as needed. 90 tablet 12    tiZANidine (ZANAFLEX) 4 MG tablet TAKE 1 TABLET(4 MG) BY MOUTH EVERY 6 HOURS AS NEEDED 90 tablet 12    traZODone (DESYREL) 100 MG tablet Take 1 tablet (100 mg total) by mouth every evening. 30 tablet 11    triamcinolone acetonide 0.1% (KENALOG) 0.1 % ointment Apply topically 2 (two) times daily. 30 g 1    TRUE METRIX GLUCOSE METER [facility] TEST BG BID  0    zolpidem (AMBIEN) 10 mg Tab Take 1 tablet (10 mg total) by mouth nightly as needed (insomnia). 30 tablet 5    [DISCONTINUED] metFORMIN (GLUCOPHAGE) 500 MG tablet Take 1 tablet (500 mg total) by mouth 2 (two) times daily with meals. 180 tablet 3     Current Facility-Administered Medications on File Prior to Visit   Medication Dose  Route Frequency Provider Last Rate Last Admin    ketorolac injection 60 mg  60 mg Intramuscular 1 time in Clinic/HOD David Cortez MD        ketorolac injection 60 mg  60 mg Intramuscular 1 time in Clinic/HOD David Crotez MD        lactated ringers infusion   Intravenous Continuous Humberto Angel MD   Stopped at 02/11/20 0801    lidocaine (PF) 10 mg/ml (1%) injection 5 mg  0.5 mL Intradermal Once Humberto Angel MD        onabotulinumtoxina injection 200 Units  200 Units Intramuscular Q90 Days David Cortez MD   200 Units at 10/19/18 1713    onabotulinumtoxina injection 200 Units  200 Units Intramuscular Q90 Days David Cortez MD   200 Units at 12/28/18 1106    onabotulinumtoxina injection 200 Units  200 Units Intramuscular Q90 Days David Cortez MD   200 Units at 03/13/19 1504    onabotulinumtoxina injection 200 Units  200 Units Intramuscular Q90 Days David Cortez MD   200 Units at 05/23/19 1123    onabotulinumtoxina injection 200 Units  200 Units Intramuscular Q90 Days David Cortez MD   200 Units at 10/11/19 1112    onabotulinumtoxina injection 200 Units  200 Units Intramuscular Q90 Days David Cortez MD   200 Units at 12/19/19 1036    onabotulinumtoxina injection 200 Units  200 Units Intramuscular Q10 weeks David Cortez MD   200 Units at 03/10/20 1036    onabotulinumtoxina injection 200 Units  200 Units Intramuscular Q90 Days David Cortez MD   200 Units at 06/26/20 1538    triamcinolone acetonide injection 40 mg  40 mg Intramuscular 1 time in Clinic/HOD INOCENCIA Neville           Exam:  Vital Signs deferred with home visit    Constitutional  Well-developed, well-nourished, appears stated age   Eyes  No scleral icterus   ENT  Moist oral mucosa   Cardiovascular  No lower extremity edema    Respiratory  No labored breathing    Skin  No rashes   Hematologic  No bruising   Psychiatric  Normal mood and affect   Other  GI/ deferred    Neurological     * Mental status  Alert and oriented to  "person, place, time, and situation; no dysarthria; no aphasia; normal recent and remote memory; follows commands   * Cranial nerves     - CN II  Pupils midposition and symmetric   - CN III, IV, VI  Extraocular movements full, no nystagmus visualized   - CN V  Unable to test   - CN VII  Face strong and symmetric bilaterally    - CN VIII  Hearing grossly intact with conversation    - CN IX, X  Palate raises midline and symmetric    - CN XI  Strong shoulder shrug B    - CN XII  Tongue appears midline    * Motor  Normal bulk by appearance, no drift    * Sensory  Not tested objectively, no changes described by the patient   * Deep tendon reflexes  Not tested   * Coord/Movemt/Gait Mild gravelly hypophonic speech.  No facial masking.  No B bradykinesia.  No tremor with rest, posture, kinesis, or intention.   No chorea, athetosis, dystonia, myoclonus, or tics.   No motor impersistence.  Gait is deferred for safety.             Medical Record Review:  - Lab Results:  No visits with results within 3 Month(s) from this visit.   Latest known visit with results is:   Admission on 06/02/2022, Discharged on 06/02/2022   Component Date Value Ref Range Status    Final Pathologic Diagnosis 06/02/2022    Final                    Value:[facility] DIAGNOSIS:  A.   STOMACH, BIOPSY:  - Antral mucosa with focal complete type intestinal metaplasia, negative for  dysplasia.  - Background reactive/chemical gastropathy.  - Oxyntic mucosa, no diagnostic alteration.  - No Helicobacter pylori organisms identified by *[facility].  - Negative for malignancy.  B.   COLON, SIGMOID, POLYPECTOMY:  - Hyperplastic polyp.  TERRY OLIVARES M.D.  Report attached.  Performing site:  [address] [address]  "Disclaimer:  This case diagnosis was rendered completely by the outside  consultation pathologist and the case is electronically signed by an [facility]  pathologist listed below solely to release the report into the medical  record."      " Disclaimer 2022    Final                    Value:Unless the case is a 'gross only' or additional testing only, the final  diagnosis for each specimen is based on a microscopic examination of  appropriate tissue sections.         Diagnoses:   1) Memory impairment - temp worse with grief  2) Seizures - follows Cortez  3) Functional movement disorder manifesting as tremor, 2/2 grief and coping.    Medical Decision Makin) no new med changes from me - work with psych over time   2) functional restoration clinic might be helpful - ordered  3) f/u yearly          Toby Paredes MD, MPH  Division of Movement and Memory Disorders  [facility]  [phone]

## 2022-10-14 ENCOUNTER — PATIENT MESSAGE (OUTPATIENT)
Dept: ADMINISTRATIVE | Facility: OTHER | Age: 57
End: 2022-10-14
Payer: MEDICARE

## 2022-10-19 ENCOUNTER — IMMUNIZATION (OUTPATIENT)
Dept: INTERNAL MEDICINE | Facility: CLINIC | Age: 57
End: 2022-10-19
Payer: MEDICARE

## 2022-10-19 ENCOUNTER — LAB VISIT (OUTPATIENT)
Dept: LAB | Facility: HOSPITAL | Age: 57
End: 2022-10-19
Attending: INTERNAL MEDICINE
Payer: MEDICARE

## 2022-10-19 ENCOUNTER — OFFICE VISIT (OUTPATIENT)
Dept: INTERNAL MEDICINE | Facility: CLINIC | Age: 57
End: 2022-10-19
Payer: MEDICARE

## 2022-10-19 VITALS
HEART RATE: 101 BPM | OXYGEN SATURATION: 95 % | HEIGHT: 64 IN | DIASTOLIC BLOOD PRESSURE: 86 MMHG | SYSTOLIC BLOOD PRESSURE: 118 MMHG | BODY MASS INDEX: 50.02 KG/M2 | WEIGHT: 293 LBS

## 2022-10-19 DIAGNOSIS — I10 ESSENTIAL HYPERTENSION: Primary | ICD-10-CM

## 2022-10-19 DIAGNOSIS — E78.2 MIXED HYPERLIPIDEMIA: ICD-10-CM

## 2022-10-19 DIAGNOSIS — E53.8 B12 DEFICIENCY: ICD-10-CM

## 2022-10-19 DIAGNOSIS — Z12.31 SCREENING MAMMOGRAM, ENCOUNTER FOR: ICD-10-CM

## 2022-10-19 DIAGNOSIS — R73.03 PREDIABETES: ICD-10-CM

## 2022-10-19 DIAGNOSIS — E66.01 CLASS 3 SEVERE OBESITY DUE TO EXCESS CALORIES WITH SERIOUS COMORBIDITY AND BODY MASS INDEX (BMI) OF 50.0 TO 59.9 IN ADULT: ICD-10-CM

## 2022-10-19 DIAGNOSIS — E55.9 VITAMIN D DEFICIENCY: ICD-10-CM

## 2022-10-19 DIAGNOSIS — L30.8 OTHER ECZEMA: ICD-10-CM

## 2022-10-19 LAB
25(OH)D3+25(OH)D2 SERPL-MCNC: 21 NG/ML (ref 30–96)
ALBUMIN SERPL BCP-MCNC: 2.7 G/DL (ref 3.5–5.2)
ALP SERPL-CCNC: 54 U/L (ref 55–135)
ALT SERPL W/O P-5'-P-CCNC: 22 U/L (ref 10–44)
ANION GAP SERPL CALC-SCNC: 6 MMOL/L (ref 8–16)
AST SERPL-CCNC: 11 U/L (ref 10–40)
BASOPHILS # BLD AUTO: 0 K/UL (ref 0–0.2)
BASOPHILS NFR BLD: 0 % (ref 0–1.9)
BILIRUB SERPL-MCNC: 0.3 MG/DL (ref 0.1–1)
BUN SERPL-MCNC: 11 MG/DL (ref 6–20)
CALCIUM SERPL-MCNC: 9 MG/DL (ref 8.7–10.5)
CHLORIDE SERPL-SCNC: 105 MMOL/L (ref 95–110)
CO2 SERPL-SCNC: 23 MMOL/L (ref 23–29)
CREAT SERPL-MCNC: 0.8 MG/DL (ref 0.5–1.4)
DIFFERENTIAL METHOD: ABNORMAL
EOSINOPHIL # BLD AUTO: 0 K/UL (ref 0–0.5)
EOSINOPHIL NFR BLD: 0 % (ref 0–8)
ERYTHROCYTE [DISTWIDTH] IN BLOOD BY AUTOMATED COUNT: 17.2 % (ref 11.5–14.5)
EST. GFR  (NO RACE VARIABLE): >60 ML/MIN/1.73 M^2
ESTIMATED AVG GLUCOSE: 128 MG/DL (ref 68–131)
GLUCOSE SERPL-MCNC: 133 MG/DL (ref 70–110)
HBA1C MFR BLD: 6.1 % (ref 4–5.6)
HCT VFR BLD AUTO: 32.5 % (ref 37–48.5)
HGB BLD-MCNC: 10.4 G/DL (ref 12–16)
IMM GRANULOCYTES # BLD AUTO: 0.01 K/UL (ref 0–0.04)
IMM GRANULOCYTES NFR BLD AUTO: 0.3 % (ref 0–0.5)
LYMPHOCYTES # BLD AUTO: 1.2 K/UL (ref 1–4.8)
LYMPHOCYTES NFR BLD: 33.1 % (ref 18–48)
MCH RBC QN AUTO: 31.2 PG (ref 27–31)
MCHC RBC AUTO-ENTMCNC: 32 G/DL (ref 32–36)
MCV RBC AUTO: 98 FL (ref 82–98)
MONOCYTES # BLD AUTO: 0.3 K/UL (ref 0.3–1)
MONOCYTES NFR BLD: 8.9 % (ref 4–15)
NEUTROPHILS # BLD AUTO: 2.1 K/UL (ref 1.8–7.7)
NEUTROPHILS NFR BLD: 57.7 % (ref 38–73)
NRBC BLD-RTO: 0 /100 WBC
PLATELET # BLD AUTO: 240 K/UL (ref 150–450)
PMV BLD AUTO: 11.3 FL (ref 9.2–12.9)
POTASSIUM SERPL-SCNC: 3.6 MMOL/L (ref 3.5–5.1)
PROT SERPL-MCNC: 8.8 G/DL (ref 6–8.4)
RBC # BLD AUTO: 3.33 M/UL (ref 4–5.4)
SODIUM SERPL-SCNC: 134 MMOL/L (ref 136–145)
VIT B12 SERPL-MCNC: 331 PG/ML (ref 210–950)
WBC # BLD AUTO: 3.6 K/UL (ref 3.9–12.7)

## 2022-10-19 PROCEDURE — 36415 COLL VENOUS BLD VENIPUNCTURE: CPT | Performed by: INTERNAL MEDICINE

## 2022-10-19 PROCEDURE — 99215 OFFICE O/P EST HI 40 MIN: CPT | Mod: PBBFAC,25 | Performed by: INTERNAL MEDICINE

## 2022-10-19 PROCEDURE — G0008 ADMIN INFLUENZA VIRUS VAC: HCPCS | Mod: PBBFAC

## 2022-10-19 PROCEDURE — 99214 OFFICE O/P EST MOD 30 MIN: CPT | Mod: S$PBB,,, | Performed by: INTERNAL MEDICINE

## 2022-10-19 PROCEDURE — 85025 COMPLETE CBC W/AUTO DIFF WBC: CPT | Performed by: INTERNAL MEDICINE

## 2022-10-19 PROCEDURE — 99999 PR PBB SHADOW E&M-EST. PATIENT-LVL V: ICD-10-PCS | Mod: PBBFAC,,, | Performed by: INTERNAL MEDICINE

## 2022-10-19 PROCEDURE — 82607 VITAMIN B-12: CPT | Performed by: INTERNAL MEDICINE

## 2022-10-19 PROCEDURE — 99999 PR PBB SHADOW E&M-EST. PATIENT-LVL V: CPT | Mod: PBBFAC,,, | Performed by: INTERNAL MEDICINE

## 2022-10-19 PROCEDURE — 80053 COMPREHEN METABOLIC PANEL: CPT | Performed by: INTERNAL MEDICINE

## 2022-10-19 PROCEDURE — 82306 VITAMIN D 25 HYDROXY: CPT | Performed by: INTERNAL MEDICINE

## 2022-10-19 PROCEDURE — 99214 PR OFFICE/OUTPT VISIT, EST, LEVL IV, 30-39 MIN: ICD-10-PCS | Mod: S$PBB,,, | Performed by: INTERNAL MEDICINE

## 2022-10-19 PROCEDURE — 83036 HEMOGLOBIN GLYCOSYLATED A1C: CPT | Performed by: INTERNAL MEDICINE

## 2022-10-19 RX ORDER — TRIAMCINOLONE ACETONIDE 1 MG/G
OINTMENT TOPICAL 2 TIMES DAILY
Qty: 30 G | Refills: 1 | Status: SHIPPED | OUTPATIENT
Start: 2022-10-19 | End: 2023-10-18

## 2022-10-19 NOTE — PROGRESS NOTES
INTERNAL MEDICINE ESTABLISHED PATIENT VISIT NOTE    Subjective:     Chief Complaint: Follow-up  HTN, preDM     Patient ID: Amna Chawla is a 57 y.o. female with  hx CVA and TIA c residual cognitive deficits (most recently c TIA 2018 per pt, has mild B weakness from several strokes in the past), carotid a disease, HTN, paroxysmal A fib c LAE, hx sudden cardiac arrest c VF and no ischemic heart disease and preserved EF (), intermittent 3rd deg AV block and symptomatic LVEF of 40% in setting of normal PET stress and chronic RV pacing s/p CRT-D, HLD, thyroid nodule s/p FNA 2018 c path c/w benign follicular nodule, depression with anxiety followed by psych, chronic complex h/a c occipital neuralgia followed by Neuro and on mult meds and has also had tx c botox, GERD c hiatal hernia, B carpal tunnel s/p release B, hx L middle ring finger trigger finger s/p release, SHIRA on APAP 6-20cm followed in sleep clinic, insomnia, prediabetes, last seen by me in April, here today for 6 mo f/u.    Had ED visit in May for chest pain.  BP elevated in 170s sys in ED.  Suspected MSK etiology and rx'ed Mobic x 5 days.    Returned to ED 2 days later for dizziness and lightheadedness.  CT head showed small right remote cerebellar infarct.  MR imaging not done due to presence of pacemaker.  Was dx'ed c TIA and possible complex migraine.  Was rx'ed Despiramine by Dr. Dana coughlin dizziness.    Was also seen by Dr. Paredes for memory issues.  Felt worsening of sx could be due to grief.    Still seeing Dr. Dunn for wt mgmt.    Sees [facility] for psych as well as Dr. Huffman.  Grieving over the loss of her sister who  in April due to a car accident.        Past Medical History:  Past Medical History:   Diagnosis Date    Anticoagulant long-term use     Anxiety     Asthma     Atrial fibrillation     Brain anoxic injury     Cervicalgia 2014    CHI (closed head injury) 2013    Convulsion 2015    Decreased ROM of  left shoulder 4/12/2017    Defibrillator activation 2013    Depression     Heart block     History of sudden cardiac arrest 2/2013    PEA arrest with subsequent long-QT    Hx of psychiatric care     Hypertension     Left atrial enlargement 4/11/2018    Pacemaker 2013    Paresthesia 11/1/2013    Prolonged Q-T interval on ECG 2/8/2013    Psychiatric problem     Seizures     Sleep difficulties     Stroke     weakness lt side    Therapy     Thyroid disease     Upper airway resistance syndrome 2/21/2017       Home Medications:  Prior to Admission medications    Medication Sig Start Date End Date Taking? Authorizing Provider   acetaminophen (TYLENOL) 325 MG tablet Take 2 tablets (650 mg total) by mouth every 6 (six) hours as needed for Pain. 5/17/22   Devyn Luciano MD   amLODIPine (NORVASC) 5 MG tablet Take 1 tablet (5 mg total) by mouth once daily. 5/24/22 5/24/23  Alok Grossman MD   ARIPiprazole (ABILIFY) 15 MG Tab Take 1 tablet (15 mg total) by mouth once daily. 4/28/22   Kade Huffman III, NP   blood sugar diagnostic Strp 1 strip by Misc.(Non-Drug; Combo Route) route 2 (two) times daily. 5/20/19   Tiffany Mina MD   blood-glucose meter kit Please provide with insurance covered meter 5/20/19   Tiffany Mina MD   carvediloL (COREG) 25 MG tablet TAKE 1 TABLET(25 MG) BY MOUTH TWICE DAILY WITH MEALS 6/10/22   Avelino Mejia MD   cetirizine (ZYRTEC) 10 MG tablet Take 1 tablet (10 mg total) by mouth once daily. for 7 days 7/13/22 9/23/22  Laina Melvin, INOCENCIA   clonazePAM (KLONOPIN) 1 MG tablet Take 1 tablet (1 mg total) by mouth daily as needed for Anxiety. 4/28/22   Kade Huffman III, NP   clopidogreL (PLAVIX) 75 mg tablet Take 1 tablet (75 mg total) by mouth once daily. 2/15/22   Alok Grossman MD   cyanocobalamin, vitamin B-12, 1,000 mcg Subl Place 1,000 mcg under the tongue once daily. 4/27/21   Tiffany Mina MD   diclofenac sodium (VOLTAREN) 1 % Gel Apply 2 g topically 4 (four) times daily. 11/5/20   Mariel  IDA Tomlinson   donepezil (ARICEPT) 10 MG tablet Take 1 tablet (10 mg total) by mouth 2 (two) times daily. 7/18/17   Toby Paredes MD   EPINEPHrine (EPIPEN) 0.3 mg/0.3 mL AtIn Inject 0.3 mLs (0.3 mg total) into the muscle once. for 1 dose 12/2/21 9/23/22  Yahaira Santos PA-C   etodolac (LODINE) 500 MG tablet Take 1 tablet (500 mg total) by mouth 2 (two) times daily as needed (migraine). 1/21/21   David Cortez MD   famotidine (PEPCID) 20 MG tablet Take 1 tablet (20 mg total) by mouth 2 (two) times daily. for 7 days 7/13/22 9/23/22  INOCENCIA Neville   flurbiprofen (ANSAID) 100 MG tablet Take 1 tablet (100 mg total) by mouth 2 (two) times daily as needed (migraine). 9/12/22   David Cortez MD   fremanezumab-vfrm (AJOVY) 225 mg/1.5 mL injection Inject 1.5 mLs (225 mg total) into the skin every 28 days. 2/17/22   David Cortez MD   furosemide (LASIX) 20 MG tablet Take 1 tablet (20 mg total) by mouth once daily. 6/22/21 9/23/22  Rin Martins MD   gabapentin (NEURONTIN) 300 MG capsule Take 3 capsules (900 mg total) by mouth 3 (three) times daily. 11/30/16   David Cortez MD   hydrOXYzine HCL (ATARAX) 25 MG tablet Take 1 tablet (25 mg total) by mouth 3 (three) times daily as needed for Itching. 7/13/22   INOCENCIA Neville   lancets Misc 1 Device by Misc.(Non-Drug; Combo Route) route 2 (two) times daily. 5/20/19   Tiffany Mina MD   losartan (COZAAR) 100 MG tablet Take 1 tablet (100 mg total) by mouth once daily. 4/14/22 4/14/23  Tiffany Mina MD   magnesium 200 mg Tab Take 200 mg by mouth once daily.  Patient taking differently: Take 200 mg by mouth every other day. 3/7/18   David Cortez MD   mupirocin (BACTROBAN) 2 % ointment Apply to affected area 3 times daily 9/16/21   Tiffany Mina MD   omeprazole (PRILOSEC) 40 MG capsule Take 1 capsule (40 mg total) by mouth once daily. Take 30 minutes before breakfast 4/19/22 4/19/23  Tanmay Thomas MD   ondansetron (ZOFRAN-ODT) 4 MG TbDL Take 1  tablet (4 mg total) by mouth every 12 (twelve) hours as needed (nausea). 6/23/21   David Cortez MD   ondansetron (ZOFRAN-ODT) 4 MG TbDL Take 1 tablet (4 mg total) by mouth every 12 (twelve) hours as needed (nausea). 7/20/22   David Cortez MD   rivaroxaban (XARELTO) 20 mg Tab TAKE 1 TABLET BY MOUTH DAILY EVENING MEAL WITH DINNER 2/14/22   Avelino Mejia MD   rosuvastatin (CRESTOR) 40 MG Tab Take 1 tablet (40 mg total) by mouth every evening. 4/19/22   Tiffany Mina MD   sertraline (ZOLOFT) 100 MG tablet Take 1.5 tablets (150 mg total) by mouth once daily. 4/28/22   Kade Huffman III, IDA   silver sulfADIAZINE 1% (SILVADENE) 1 % cream Apply topically 2 (two) times daily. 1/4/22   Tiffany Mina MD   suvorexant (BELSOMRA) 10 mg Tab Take 1 tablet by mouth nightly as needed (insomnia). 5/19/22   David Cortez MD   tiaGABine (GABITRIL) 4 MG tablet Take 1 tablet (4 mg total) by mouth every evening. 8/3/20   David Cortez MD   tiZANidine (ZANAFLEX) 4 MG tablet TAKE 1 TABLET(4 MG) BY MOUTH EVERY 6 HOURS AS NEEDED 12/14/21   David Cortez MD   tiZANidine (ZANAFLEX) 4 MG tablet Take 1 tablet (4 mg total) by mouth every 6 (six) hours as needed. 12/21/21   David Cortez MD   tiZANidine (ZANAFLEX) 4 MG tablet Take 1 tablet (4 mg total) by mouth every 6 (six) hours as needed. 12/10/21   David Cortez MD   tiZANidine (ZANAFLEX) 4 MG tablet TAKE 1 TABLET(4 MG) BY MOUTH EVERY 6 HOURS AS NEEDED 6/13/22   David Cortez MD   traZODone (DESYREL) 100 MG tablet Take 1 tablet (100 mg total) by mouth every evening. 4/28/22 4/28/23  Kade Huffman III, NP   triamcinolone acetonide 0.1% (KENALOG) 0.1 % ointment Apply topically 2 (two) times daily. 7/13/22   Laina Melvin, INOCENCIA   TRUE METRIX GLUCOSE METER Misc TEST BG BID 5/30/19   Historical Provider   ubrogepant (UBROGEPANT) 100 mg tablet Take 1 tablet (100 mg total) by mouth 2 (two) times daily as needed for Migraine. 6/22/21   David Cortez MD   zolpidem (AMBIEN) 10 mg Tab  Take 1 tablet (10 mg total) by mouth nightly as needed (insomnia). 4/28/22   Kade Huffman III, IDA   metFORMIN (GLUCOPHAGE) 500 MG tablet Take 1 tablet (500 mg total) by mouth 2 (two) times daily with meals. 4/19/22 6/2/22  Tiffany Mina MD       Allergies:  Review of patient's allergies indicates:   Allergen Reactions    Aspirin Hives    Imitrex [sumatriptan] Palpitations    Penicillins Hives and Swelling    Shellfish containing products Anaphylaxis     seafood    Reglan [metoclopramide hcl] Other (See Comments)     Parkinsonism        Social History:  Social History     Tobacco Use    Smoking status: Never    Smokeless tobacco: Never   Substance Use Topics    Alcohol use: Yes     Comment: wine, socially    Drug use: No        Review of Systems   Constitutional:  Negative for appetite change, chills, fatigue, fever and unexpected weight change.   HENT:  Negative for congestion, hearing loss and rhinorrhea.    Eyes:  Negative for pain and visual disturbance.   Respiratory:  Negative for cough, chest tightness, shortness of breath and wheezing.    Cardiovascular:  Negative for chest pain, palpitations and leg swelling.   Gastrointestinal:  Negative for abdominal distention and abdominal pain.   Endocrine: Negative for polydipsia and polyuria.   Genitourinary:  Negative for decreased urine volume, difficulty urinating, dysuria, hematuria and vaginal discharge.   Musculoskeletal:  Positive for back pain.   Neurological:  Positive for headaches. Negative for weakness and numbness.   Psychiatric/Behavioral:  Negative for behavioral problems and confusion.        Health Maintenance:     Immunizations:   Influenza 10/2021, repeat rec today.  TDap 10/2/2018  Pneumovax 9/2018 here per pt  Shingrix 10/2019, 3/2020 completed.  Moderna COVID 2/2021, 3/2021, 1/2022, Omicron booster rec now.     Cancer Screening:  PAP: 9/2019 c Dr. Marroquin wnl, rec f/u  Mammogram:  12/2021 benign, will schedule repeat for Dec.  Colonoscopy:   "5/2019, polyps x2 removed, tubular adenoma dn tubulovillous adenoma on path c rec repeat in 3 yrs per report.    Repeat done 6/2022 c polyp x1, path c/w hyperplastic polyp, rec f/u in 5 yrs.    Objective:   /86 (BP Location: Left arm, Patient Position: Sitting, BP Method: Large (Manual))   Pulse 101   Ht 5' 4" (1.626 m)   Wt (!) 144.8 kg (319 lb 3.6 oz)   LMP 02/04/2016 (Approximate)   SpO2 95%   BMI 54.80 kg/m²        General: AAO x3, no apparent distress  HEENT: PERRL, OP clear  CV: RRR, no m/r/g  Pulm: Lungs CTAB, no crackles, no wheezes  Abd: s/NT/ND +BS  Extremities: no c/c/e  Skin: dryness and scaling noted on B hands.    Labs:     Lab Results   Component Value Date    WBC 3.60 (L) 10/19/2022    HGB 10.4 (L) 10/19/2022    HCT 32.5 (L) 10/19/2022    MCV 98 10/19/2022     10/19/2022     Lab Results   Component Value Date    IRON 80 01/18/2019    TRANSFERRIN 228 01/18/2019    TIBC 337 01/18/2019    FESATURATED 24 01/18/2019      Lab Results   Component Value Date    FERRITIN 59 01/18/2019       Sodium   Date Value Ref Range Status   10/19/2022 134 (L) 136 - 145 mmol/L Final     Potassium   Date Value Ref Range Status   10/19/2022 3.6 3.5 - 5.1 mmol/L Final     Chloride   Date Value Ref Range Status   10/19/2022 105 95 - 110 mmol/L Final     CO2   Date Value Ref Range Status   10/19/2022 23 23 - 29 mmol/L Final     Glucose   Date Value Ref Range Status   10/19/2022 133 (H) 70 - 110 mg/dL Final     BUN   Date Value Ref Range Status   10/19/2022 11 6 - 20 mg/dL Final     Creatinine   Date Value Ref Range Status   10/19/2022 0.8 0.5 - 1.4 mg/dL Final     Calcium   Date Value Ref Range Status   10/19/2022 9.0 8.7 - 10.5 mg/dL Final     Total Protein   Date Value Ref Range Status   10/19/2022 8.8 (H) 6.0 - 8.4 g/dL Final     Albumin   Date Value Ref Range Status   10/19/2022 2.7 (L) 3.5 - 5.2 g/dL Final     Total Bilirubin   Date Value Ref Range Status   10/19/2022 0.3 0.1 - 1.0 mg/dL Final     " Comment:     For infants and newborns, interpretation of results should be based  on gestational age, weight and in agreement with clinical  observations.    Premature Infant recommended reference ranges:  Up to 24 hours.............<8.0 mg/dL  Up to 48 hours............<12.0 mg/dL  3-5 days..................<15.0 mg/dL  6-29 days.................<15.0 mg/dL       Alkaline Phosphatase   Date Value Ref Range Status   10/19/2022 54 (L) 55 - 135 U/L Final     AST   Date Value Ref Range Status   10/19/2022 11 10 - 40 U/L Final     ALT   Date Value Ref Range Status   10/19/2022 22 10 - 44 U/L Final     Anion Gap   Date Value Ref Range Status   10/19/2022 6 (L) 8 - 16 mmol/L Final     eGFR if    Date Value Ref Range Status   05/17/2022 >60.0 >60 mL/min/1.73 m^2 Final     eGFR if non    Date Value Ref Range Status   05/17/2022 >60.0 >60 mL/min/1.73 m^2 Final     Comment:     Calculation used to obtain the estimated glomerular filtration  rate (eGFR) is the CKD-EPI equation.        Lab Results   Component Value Date    HGBA1C 6.1 (H) 10/19/2022     Lab Results   Component Value Date    LDLCALC 73.0 05/17/2022     Lab Results   Component Value Date    TSH 1.185 05/17/2022         Assessment/Plan     Amna was seen today for follow-up.    Diagnoses and all orders for this visit:    Essential hypertension  at goal.  Cont current medications.    Mixed hyperlipidemia  Lab Results   Component Value Date    LDLCALC 73.0 05/17/2022     at goal.  Cont current medications.    Prediabetes  Lab Results   Component Value Date    HGBA1C 6.1 (H) 10/19/2022     Improved on recent labs  Cont lifestyle modifications.  Pt was counseled on the need to avoid intake of simple sugars and minimize carbohydrates.  Also recommended weight loss and daily exercise.  -     Comprehensive Metabolic Panel; Future  -     CBC Auto Differential; Future  -     Hemoglobin A1C; Future    Vitamin D deficiency  Suboptimal on recent  labs  Pt advised to resume D3 2000 IU daily  -     Vitamin D; Future    B12 deficiency  Suboptimal on recent labs  Pt advised to take otc B12 50 mcg daily  -     Vitamin B12; Future    Screening mammogram, encounter for  -     Mammo Digital Screening Bilat w/ Surendra; Future    Class 3 severe obesity due to excess calories with serious comorbidity and body mass index (BMI) of 50.0 to 59.9 in adult  Trending up  Counseled extensively on need for diet changes and daily exercise.  Discussed examples of healthy eating.  Recommend at least 30 minutes of exercise at least 5 days a week.      Eczema  Rec otc Cerave lotion  -     triamcinolone acetonide 0.1% (KENALOG) 0.1 % ointment; Apply topically 2 (two) times daily.      HM as above  RTC in 6 mos, sooner if needed.    Tiffany Mina MD  Department of Internal Medicine - [facility]  10/25/2022

## 2022-10-31 ENCOUNTER — EXTERNAL CHRONIC CARE MANAGEMENT (OUTPATIENT)
Dept: PRIMARY CARE CLINIC | Facility: CLINIC | Age: 57
End: 2022-10-31
Payer: MEDICARE

## 2022-10-31 PROCEDURE — 99490 CHRNC CARE MGMT STAFF 1ST 20: CPT | Mod: PBBFAC | Performed by: INTERNAL MEDICINE

## 2022-10-31 PROCEDURE — 99439 PR CHRONIC CARE MGMT, EA ADDTL 20 MIN: ICD-10-PCS | Mod: S$PBB,,, | Performed by: INTERNAL MEDICINE

## 2022-10-31 PROCEDURE — 99439 CHRNC CARE MGMT STAF EA ADDL: CPT | Mod: PBBFAC | Performed by: INTERNAL MEDICINE

## 2022-10-31 PROCEDURE — 99490 CHRNC CARE MGMT STAFF 1ST 20: CPT | Mod: S$PBB,,, | Performed by: INTERNAL MEDICINE

## 2022-10-31 PROCEDURE — 99439 CHRNC CARE MGMT STAF EA ADDL: CPT | Mod: S$PBB,,, | Performed by: INTERNAL MEDICINE

## 2022-10-31 PROCEDURE — 99490 PR CHRONIC CARE MGMT, 1ST 20 MIN: ICD-10-PCS | Mod: S$PBB,,, | Performed by: INTERNAL MEDICINE

## 2022-11-02 NOTE — PROGRESS NOTES
NEUROPSYCHOLOGY CONSULT (Telehealth)  Referral Information  Name: Amna Chawla  MRN: [id number]  : 1965  Age: 57 y.o.  Race: Black or   Gender: female  REFERRAL SOURCE: Toby Paredes MD  DATE CONDUCTED: 2022  Billin - 60 minutes  Telemedicine:   The patient location is: Home  The provider location is: Home  The chief complaint/medical necessity leading to consultation/medical necessity is: cognitive dysfunction following suspected anoxic brain injury.   Visit type: Virtual visit with synchronous audio and video  Total time spent with patient:   Each patient to whom he or she provides medical services by telemedicine is:  (1) informed of the relationship between the physician and patient and the respective role of any other health care provider with respect to management of the patient; and (2) notified that he or she may decline to receive medical services by telemedicine and may withdraw from such care at any time.  Consent/Emergency Plan: The patient expressed an understanding of the purpose of the evaluation and consented to all procedures. I informed the patient of limits to confidentiality and discussed an emergency plan.    NEUROPSYCHOLOGICAL EVALUATION - CONFIDENTIAL    SOURCES OF INFORMATION:  The following was gathered from a clinical interview with Ms. Amna Chawla and review of the available medical records. Ms. Chawla expressed an understanding of the purpose of the evaluation and consented to all procedures. Total licensed billing psychologists professional time including clinical interview, test administration and interpretation of tests administered by the billing psychologist, integration of test results and other clinical data, preparing the final report, and personally reporting results to the patient     SUMMARY/TREATMENT PLAN   Ms. Chawla is a 57 year old female with history of small remote right cerebellar infarct and suspected anoxic injury in  . She has experienced mild, persistent cognitive changes since that , although her course fluctuates based on management of sleep, headaches, and depression. She previously underwent neuropsychological evaluations in , , , and . Unfortunately, her sister  tragically in 2022 which has understandably greatly impacted her mood. She reported good and bad days, noting that she doesn't want to leave bed on her bad days. Similarly, she notices fluctuating cognitive symptoms that tend to correlate with her mood. 2 recent neurology notes also highlighted the impact of mood on her symptoms, noting that her exam was suggestive of a functional neurological disorder. She is scheduled for neuropsychological testing on  for interval assessment. She preferred undergoing testing after Thanksgiving, knowing that it's good to be a difficult period without her sister. She was also welcomed to reschedule the appointment if needed. Full diagnostic impressions to follow, but a multifactorial etiology including cerebrovascular disease, anoxic brain injury, and polypharmacy is likely.     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     Compensatory Mechanisms: She indicated that family members encourage her to write down information, but she doesn't consistently do so.     Optimize Brain Health: Prioritize physical/social/cognitive activity/stimulation. Maintain a heart healthy diet.     Follow-up: Testing on .             Psychiatric    Depression, major, recurrent, moderate    Current Assessment & Plan     Treatment: Continue to follow with psychiatry and psychology. Prioritize  behavioral activation.          Grief reaction        Ms. Chawla will be provided the results of the evaluation.     Thank you for allowing me to participate in Ms. Meyer care.  If you have any questions, please contact me at [phone].    Gigi Rivas Psy.D., YOVANAP  Board Certified in Clinical Neuropsychology  Department of Neurology    HISTORY OF PRESENT ILLNESS: Ms. Amna Chawla is a 57 y.o., right-handed, -American  female with 13 years of education who was referred for a neuropsychological evaluation in the setting of suspected anoxic brain injury in 2013. She previously underwent neuropsychological assessment in 2013 (Ray), 2015 (Rob), 2017 (Rob), and 2021 (Rob). Her 2017 evaluation represented a marked improved from 2015, with only mild frontosubcortical dysfunction. It was opined that the improvement was secondary to several factors, including improved sleep, headache management, and depression. 2021 evaluation was largely stable compared to 2017.     2015 HPI: referred for an evaluation of her cognitive functioning due to a self-reported decline in memory and daily functioning since a serious medical event in February, 2013. Her medical history was fairly unremarkable (hypertension) prior to suddenly passing out and becoming unresponsive while at work. She was given CPR in the field for 8 minutes until EMS arrived. She was found to be in cardiac arrest, was given epinephrine, shocked 1 time, and converted to sinus tachycardia. She subsequently had a pacemaker/AICD placed. Ms. Chawla was believed to have suffered an anoxic injury as her early neurological exams revealed no objective cortical or brainstem responses. A head CT was unremarkable. Her condition gradually improved as she had brainstem reflexes the following day with suggestion of seizure activity on EEG. Her mental status continued to gradually improve while she was in the hospital. She was discharged after several  weeks and followed up with Dr. Paredes in April, 2013. Her exam was notable for persistent mild left hemiparesis. She was fully oriented, but complained of memory loss at that time. She underwent a neuropsychological evaluation with Dr. Ray in October, 2013. Impressions offered from that evaluation included: Neuropsychological testing reveals moderate to severe impairment in memory, attention, temporal orientation, visuospatial/constructional abilities, facial recognition, and verbal fluency due to hypoxic brain injury and stroke. Naming was variable with performances in the average and moderately impaired range. She will follow up with Juan Carlos Childers DNP in Psychiatry for depression and anxiety.     Ms. Chawla reported multiple changes in her physical, cognitive, emotional, and daily functioning since the aforementioned event (which she refers to as when I dropped dead). She complains of daily headaches which she continues to feel are poorly controlled and consistently rates the pain as 10/10. She endorsed persistent mild left side weakness. She does not believe her memory has improved since the hospitalization, but she also does not think it has progressively worsened. She continues to report instances of burning food on the stove and forgetting to turn off a faucet. She also easily loses her train of thought in conversation or when reading. She endorsed word finding problems, which she labeled as disgusting. Ms. Chawla described her life as perfect prior to February, 2013. She was working, happy, and denied any prior psychiatric history. However, she admits to feeling depressed since that time. She is no longer working, relies on her daughter and other family members for care, and generally feels like a burden. She now performs very few activities of daily living and has also been hesitant to employ simple compensatory strategies for the few responsibilities she does have. For instance, she has a  calendar, but does not use it for track appointments. She also does not write down important information, even though she knows she will forget it at a later time. Ms. Meyer daughter manages her medications and finances and also gives her medication reminders. She has not driven since 2013.    2017 INTERVAL HISTORY: Her functioning has gradually improved since the previous evaluation. She is more proactive about writing down important information and planning her day. For instance, she can forget to do a task or lose her train of thought if she becomes distracted by something else. As a result, she tries to focus on completing 1 task at a time or she will write a reminder note for what she wants to tell her sister later. She acknowledged that she doesnt religiously employ these compensatory strategies, but there is at least marked improvement over the past year. Ms. Chawla continues to burn food if she leaves the kitchen to see what is on television or if distracted by another task. She has not caused any significant house fires. She admits that she doesnt set alarms/timers while cooking. She receives a great deal of support from family members. They fill her pillbox and will remind her to take the medications if she misses a dose. She is very compliant with the morning and afternoon dose, but can forget the evening dose. Fortunately, her family members will even wake her up to take them if she forgets. She is still not driving. Her daughter puts Ms. Meyer medical appointments on the calendar function on her phone.      Ms. Meyer mood has also improved over the past year, but she continues to struggle with depression and emotional lability. She was very depressed after her daughter moved about 6 months ago, but this has gradually improved as she has adapted to the change. She is still prone to episodes of depression lasting 1-2 days, highlighted by dysphoria, anhedonia, and amotivation. In fact, she  does not leave her bed on these days. While these days are on the extreme end, Ms. Chawla generally struggles with reduced motivation and anhedonia. She used to enjoy reading, but finds that she just cant get into books. She was also loved decorating her house and making wreathes, but cant get herself to do it without constant motivation from family members. For instance, her daughter was able to encourage her to make a few wreathes during the holidays, but she cant find the motivation on her own. She denied suicidal ideation, plan, or intent.      In addition to depression, Ms. Chawla described herself as highly emotionally labile. She has a shorter temper since the medical event in 2013. She especially struggles in larger crowds, which she attributes to excessive external stimuli. Similarly, she has a difficult time tracking different conversations between multiple people. She would rather remove herself than be in these situations. While she is interested in potentially returning to work on a part time basis or volunteering, she worries that depression and emotional lability would cause considerable problems. For instance, Ms. Chawla would like to work with children, but she doesnt want to lash out a child if she loses her temper. Similarly, she would hate to a miss a volunteer event because of her depression, which would make her feel even worse for letting children down. As a result, she feels it is still not the best time to volunteer. She continues to be followed by Juan Carlos Childers NP in psychiatry, which has been beneficial. She especially feels that Cymbalta has improved her mood. She very briefly attended group therapy, but did not find it beneficial. Individual therapy has also been recommended, but it appears that she did not make it to a scheduled visit.      Ms. Meyer medical status has not changed since the previous evaluation. She continues to struggle with frequent migraine headaches,  but they have improved with quarterly BOTOX injections. The headaches tend to be worse the closer she gets to the quarterly injection. She has occasional problems with sleep initiation, with benefit from Ambien 2-3 days per week. She denied problems with sleep maintenance. She is not sleepy during the day and does not nap, even when she doesnt leave her bed due to depression.    2021 INTERVAL HISTORY: Ms. Chawla reported the same pattern of cognitive/functional difficulties, with possible mild worsening since the previous evaluation. She remains very prone to losing her train of thought. She will forget why she called someone if they don't answer, she will forget something on the stove or in the oven, and forget why she walked into a room. She has to interrupt in conversation or else she will forget what she wants to say if she doesn't say it right away. She will also lose the thread of what she is saying when talking. She described considerable trouble with set shifting, noting that she will start a project, walk away, and then forget to return to the project or forget what step of the project she was on. As a result, she tries to stay with one task at a time until completion. Still, this is difficult for her, resulting in multiple incomplete tasks/projects.     Ms. Chawla also continues to describe symptoms of depression, particularly reduced drive/motivation. For instance, she has taken apart her [place] tree, but the pieces are still sitting in her living room. She spends the majority of her time in bed, even when she is not sleeping. She noted this is worse since the pandemic as she limits activity outside of the house to avoid christina the virus. She only goes to [facility] or her sister's house. She reported some day when her mood is especially low. She tries to stay active on these days, forcing herself to take on a project around the house. She doesn't drink alcohol after being told to avoid it by one  "of her providers, although she could not recall the exact reason why. She described days in which she has "evil spirits" telling her to have a drink and don't worry about the consequences, but she still avoids it. It doesn't bother her that she can't drink alcohol, but there are times when she would like a drink. Still, she is happy with her level of self-control as she has also changed her diet to improve her blood sugar. She denied active SI, plan, or intent. She is followed by psychiatry and has a therapist, but noted that she is not sure if she and her therapist click. She did group therapy for a period of time, but did not find it beneficial since no one in the group had a neurological injury like herself.     Ms. Chawla described her headaches as "crazy." She received BOTOX the day before this evaluation and is hoping this provides some relief.      INTERVAL HISTORY:   -Ms. Chawla indicated that the most noteworthy event since the previous evaluation was her sister's tragic death in 2022. Her sister was in [place] and on the way to the airport when a drunk  struck her Uber. The whole experience was incredibly difficult, including the fact that she and her sister were communicating all weekend and she was the first person to receive the call about her death, meaning that she had to share the news with multiple family members. She described her sister as her "caregiver" and indicated that they were incredibly close. She has struggled with grief and depression since that time, noting good and bad days. She suspects that she hasn't fully grieved her death as she went "blank" during her  and doesn't remember anything about it. She is planning to visit her grave in the near future, hoping that may help with the process. She knows the holidays will be difficult, especially Thanksgiving, since her sister usually coordinated and cooked for the holiday.   -Ms. Chawla presented to the ED in 2022, " "a few weeks after her sister's death. She reported right shoulder and chest pain and then weakness, prompting stroke work-up. Neuroimaging was negative with exam revealing "evidence of embellishment therefore low suspicion of cerebrovascular origin of patients symptoms."  -She was seen in outpatient neurology in 10/2022 with Dr. Paredes noting "Functional movement disorder manifesting as tremor." Ms. Chawla is aware that her tremor is more prominent when she is stressed/anxious.   -She is followed by psychiatry and psychology. She feels that she has a good relationship with both providers and will be seeing her therapist for an appointment next week. She was given resources for grief counseling classes/groups, but is still awaiting a call back.     -Regarding cognition, she indicated that it's still difficult for her to accept that her symptoms are unlikely to improve from the 2013. Furthermore, she feels that her cognition has worsened since the previous evaluation. She described fluctuations that are dependent on her mood. On bad days, she is very inattentive and forgetful. She is error prone when in the kitchen, especially when trying to do 2 things at once, such as cook and wash the dishes (the sink has overflowed). She put butter in the cabinet the other day and later realized her mistake, but was still surprised that it happened. She frequently jumps from one task to another without seeing it to completion. She will think that she already told someone something when she hasn't. She will call her daughter, her daughter won't , and she'll forget what she was going to tell her when they talk later. She acknowledged that her family tells her to writes things down, but she only does so inconsistently.     Neuropsychiatric Symptoms:  Hallucinations: Denied  Delusional/Paranoid Thinking: Denied  Apathy: Endorsed, stating that she has "no get up" and that nothing excites her.   Irritability/Agitation: " "Endorsed. She gets upset when family says that she never told her something that she is convinced that she did.   Depression/Labile Mood: Endorsed. She denied active SI, plan, or intent. She indicated the past 3 days have been a difficult stretch where she has stayed in bed most of the day.   Anxiety: Endorsed. She can tell that her anxiety has "been out of control lately" as she understands it correlates with her tremor severity. She takes clonazepam every other day.     IADLS/DAILY FUNCTIONING:   Support System: 2 daughters and grandson are in the home. 2 sisters live locally.   Appointment Management: independent. Keeps a calendar next to her bed and checks the [employer] bel. Coordinates with daughter and sister since they drive her to appointments.   Medication Compliance: Daughter gives her medications in the morning or calls to remind her if she is out of the house. She has an alarm set on her phone for 5pm for evening medications. She reported strong medication compliance.   Financial Management: dependent, daughter manages.   Cooking: independent, but she is error prone as noted above.   Driving: She has not returned to driving since the 2013 event.     MEDICAL HISTORY: Ms. Chawla  has a past medical history of Anticoagulant long-term use, Anxiety, Asthma, Atrial fibrillation, Brain anoxic injury, Cervicalgia (8/28/2014), CHI (closed head injury) (2/19/2013), Convulsion (5/30/2015), Decreased ROM of left shoulder (4/12/2017), Defibrillator activation (2013), Depression, Heart block, History of sudden cardiac arrest (2/2013), psychiatric care, Hypertension, Left atrial enlargement (4/11/2018), Pacemaker (2013), Paresthesia (11/1/2013), Prolonged Q-T interval on ECG (2/8/2013), Psychiatric problem, Seizures, Sleep difficulties, Stroke, Therapy, Thyroid disease, and Upper airway resistance syndrome (2/21/2017).    BRAIN HEALTH RISK FACTORS:  Hearing Loss: Denied  Sleep: Ms. Chawla reported problems with sleep " "maintenance. She was prescribed trazodone at one point, but with minimal benefit. She has been taking Ambien for a few years and was recently prescribed Belsomra. She did not notice any benefit initially from Belsomra, but she plans to take it earlier to see if it has an effect. She takes one or other, not both on a given night. She falls asleep around 11pm, wakes up between 2-3am, is awake for a few hours, and then falls back asleep for a few hours. She spends most of her time in bed on days in which she is depressed. She has SHIRA and is awaiting all the parts for a new CPAP.     NEUROIMAGIN2020 Head CT: "No acute intracranial abnormality. Small remote right cerebellar infarct. Empty sella."    2022 Head CT: "No acute intracranial abnormalities.  No significant detrimental changes from prior."    SUBSTANCE USE: Ms. Chawla  reports that she has never smoked. She has never used smokeless tobacco. She reports current alcohol use. She reports that she does not use drugs.    CURRENT MEDICATIONS:    Current Outpatient Medications:     acetaminophen (TYLENOL) 325 MG tablet, Take 2 tablets (650 mg total) by mouth every 6 (six) hours as needed for Pain., Disp: 30 tablet, Rfl: 0    amLODIPine (NORVASC) 5 MG tablet, Take 1 tablet (5 mg total) by mouth once daily., Disp: 90 each, Rfl: 3    ARIPiprazole (ABILIFY) 15 MG Tab, Take 1 tablet (15 mg total) by mouth once daily., Disp: 90 tablet, Rfl: 1    blood sugar diagnostic Strp, 1 strip by Misc.(Non-Drug; Combo Route) route 2 (two) times daily., Disp: 200 strip, Rfl: 3    blood-glucose meter kit, Please provide with insurance covered meter, Disp: 1 each, Rfl: 0    carvediloL (COREG) 25 MG tablet, TAKE 1 TABLET(25 MG) BY MOUTH TWICE DAILY WITH MEALS, Disp: 60 tablet, Rfl: 11    cetirizine (ZYRTEC) 10 MG tablet, Take 1 tablet (10 mg total) by mouth once daily. for 7 days, Disp: 30 tablet, Rfl: 0    clonazePAM (KLONOPIN) 1 MG tablet, Take 1 tablet (1 mg total) by mouth " daily as needed for Anxiety., Disp: 30 tablet, Rfl: 5    clopidogreL (PLAVIX) 75 mg tablet, Take 1 tablet (75 mg total) by mouth once daily., Disp: 90 tablet, Rfl: 3    cyanocobalamin, vitamin B-12, 1,000 mcg Subl, Place 1,000 mcg under the tongue once daily., Disp: 90 tablet, Rfl: 3    diclofenac sodium (VOLTAREN) 1 % Gel, Apply 2 g topically 4 (four) times daily., Disp: 100 g, Rfl: 1    donepezil (ARICEPT) 10 MG tablet, Take 1 tablet (10 mg total) by mouth 2 (two) times daily., Disp: 180 tablet, Rfl: 3    EPINEPHrine (EPIPEN) 0.3 mg/0.3 mL AtIn, Inject 0.3 mLs (0.3 mg total) into the muscle once. for 1 dose, Disp: 0.3 mL, Rfl: 0    etodolac (LODINE) 500 MG tablet, Take 1 tablet (500 mg total) by mouth 2 (two) times daily as needed (migraine)., Disp: 30 tablet, Rfl: 12    famotidine (PEPCID) 20 MG tablet, Take 1 tablet (20 mg total) by mouth 2 (two) times daily. for 7 days, Disp: 14 tablet, Rfl: 0    flurbiprofen (ANSAID) 100 MG tablet, Take 1 tablet (100 mg total) by mouth 2 (two) times daily as needed (migraine)., Disp: 12 tablet, Rfl: 12    fremanezumab-vfrm (AJOVY) 225 mg/1.5 mL injection, Inject 1.5 mLs (225 mg total) into the skin every 28 days., Disp: 1 each, Rfl: 12    furosemide (LASIX) 20 MG tablet, Take 1 tablet (20 mg total) by mouth once daily., Disp: 30 tablet, Rfl: 6    gabapentin (NEURONTIN) 300 MG capsule, Take 3 capsules (900 mg total) by mouth 3 (three) times daily., Disp: 810 capsule, Rfl: 12    lancets Misc, 1 Device by Misc.(Non-Drug; Combo Route) route 2 (two) times daily., Disp: 200 each, Rfl: 3    losartan (COZAAR) 100 MG tablet, Take 1 tablet (100 mg total) by mouth once daily., Disp: 90 tablet, Rfl: 3    magnesium 200 mg Tab, Take 200 mg by mouth once daily. (Patient taking differently: Take 200 mg by mouth every other day.), Disp: 30 each, Rfl: 12    mupirocin (BACTROBAN) 2 % ointment, Apply to affected area 3 times daily, Disp: 22 g, Rfl: 1    omeprazole (PRILOSEC) 40 MG capsule, Take 1  capsule (40 mg total) by mouth once daily. Take 30 minutes before breakfast, Disp: 90 capsule, Rfl: 6    ondansetron (ZOFRAN-ODT) 4 MG TbDL, Take 1 tablet (4 mg total) by mouth every 12 (twelve) hours as needed (nausea)., Disp: 40 tablet, Rfl: 12    rivaroxaban (XARELTO) 20 mg Tab, TAKE 1 TABLET BY MOUTH DAILY EVENING MEAL WITH DINNER, Disp: 30 tablet, Rfl: 11    rosuvastatin (CRESTOR) 40 MG Tab, Take 1 tablet (40 mg total) by mouth every evening., Disp: 90 tablet, Rfl: 3    sertraline (ZOLOFT) 100 MG tablet, Take 1.5 tablets (150 mg total) by mouth once daily., Disp: 135 tablet, Rfl: 3    silver sulfADIAZINE 1% (SILVADENE) 1 % cream, Apply topically 2 (two) times daily., Disp: 50 g, Rfl: 0    suvorexant (BELSOMRA) 10 mg Tab, Take 1 tablet by mouth nightly as needed (insomnia)., Disp: 15 tablet, Rfl: 1    tiaGABine (GABITRIL) 4 MG tablet, Take 1 tablet (4 mg total) by mouth every evening., Disp: 30 tablet, Rfl: 12    tiZANidine (ZANAFLEX) 4 MG tablet, TAKE 1 TABLET(4 MG) BY MOUTH EVERY 6 HOURS AS NEEDED, Disp: 90 tablet, Rfl: 12    triamcinolone acetonide 0.1% (KENALOG) 0.1 % ointment, Apply topically 2 (two) times daily., Disp: 30 g, Rfl: 1    TRUE METRIX GLUCOSE METER Misc, TEST BG BID, Disp: , Rfl: 0    ubrogepant (UBROGEPANT) 100 mg tablet, Take 1 tablet (100 mg total) by mouth 2 (two) times daily as needed for Migraine., Disp: 10 tablet, Rfl: 12    zolpidem (AMBIEN) 10 mg Tab, Take 1 tablet (10 mg total) by mouth nightly as needed (insomnia)., Disp: 30 tablet, Rfl: 5    Current Facility-Administered Medications:     ketorolac injection 60 mg, 60 mg, Intramuscular, 1 time in Clinic/HOD, David Cortez MD    ketorolac injection 60 mg, 60 mg, Intramuscular, 1 time in Clinic/HOD, David Cortez MD    onabotulinumtoxina injection 200 Units, 200 Units, Intramuscular, Q90 Days, David Cortez MD, 200 Units at 10/19/18 1713    onabotulinumtoxina injection 200 Units, 200 Units, Intramuscular, Q90 Days, David Cortez,  MD, 200 Units at 12/28/18 1106    onabotulinumtoxina injection 200 Units, 200 Units, Intramuscular, Q90 Days, David Cortez MD, 200 Units at 03/13/19 1504    onabotulinumtoxina injection 200 Units, 200 Units, Intramuscular, Q90 Days, David Cortez MD, 200 Units at 05/23/19 1123    onabotulinumtoxina injection 200 Units, 200 Units, Intramuscular, Q90 Days, David Cortez MD, 200 Units at 10/11/19 1112    onabotulinumtoxina injection 200 Units, 200 Units, Intramuscular, Q90 Days, David Cortez MD, 200 Units at 12/19/19 1036    onabotulinumtoxina injection 200 Units, 200 Units, Intramuscular, Q10 weeks, David Cortez MD, 200 Units at 03/10/20 1036    onabotulinumtoxina injection 200 Units, 200 Units, Intramuscular, Q90 Days, David Cortez MD, 200 Units at 06/26/20 1538    triamcinolone acetonide injection 40 mg, 40 mg, Intramuscular, 1 time in Clinic/HOD, INOCENCIA [place]    Facility-Administered Medications Ordered in Other Visits:     lactated ringers infusion, , Intravenous, Continuous, Humberto Angel MD, Stopped at 02/11/20 0801    lidocaine (PF) 10 mg/ml (1%) injection 5 mg, 0.5 mL, Intradermal, Once, Humberto Angel MD     2015 PSYCHIATRIC HISTORY: Ms. Chawla strongly denied a pre-morbid psychiatric history, indicating that her life was perfect. However, she reported persistent symptoms of depression since February, 2013, including increased tearfulness, with pronounced anhedonia and amotivation. She is far less interested in socializing than in the past and spends her time either at home or at doctors appointments. Otherwise, she would prefer to be left alone as she is quick to snap at people. Ms. Chawla denied a history of suicidal ideation. She also denied a history of physical/sexual abuse.     SUICIDAL IDEATION:  History: Denied  Active Suicidal Ideation:Denied  Plan/Intent: Denied    FAMILY HISTORY: family history includes COPD in an other family member; Glaucoma in her maternal  grandmother and paternal grandmother; Heart failure in an other family member; Hypertension in her mother.    PSYCHOSOCIAL HISTORY:   Education:  Level Attained: High school graduate with several semesters between different colleges. No degree.      Vocation: License in [employer]. 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 She has not returned to work since that time and is currently receiving Social Security Disability benefits.    Relationship Status: Never . No children.     MENTAL STATUS AND OBSERVATIONS:  APPEARANCE: Appropriately dressed/groomed.   ALERTNESS/ORIENTATION: Attentive and alert. She demonstrated intact episodic memory for recent events.    GAIT/MOTOR: Ambulated independently.   SENSORY: Unremarkable.   SPEECH/LANGUAGE: Normal in rate, rhythm, tone, and volume. Expressive and receptive language were grossly intact.  STATED MOOD/AFFECT: Flat affect, congruent mood  INTERPERSONAL BEHAVIOR: Rapport was quickly and easily established   THOUGHT PROCESSES: Thoughts seemed logical and goal-directed.

## 2022-11-03 DIAGNOSIS — G43.711 CHRONIC MIGRAINE WITHOUT AURA, WITH INTRACTABLE MIGRAINE, SO STATED, WITH STATUS MIGRAINOSUS: Primary | ICD-10-CM

## 2022-11-08 ENCOUNTER — OFFICE VISIT (OUTPATIENT)
Dept: NEUROLOGY | Facility: CLINIC | Age: 57
End: 2022-11-08
Payer: MEDICARE

## 2022-11-08 DIAGNOSIS — S06.9X0S COGNITIVE DEFICIT AS LATE EFFECT OF TRAUMATIC BRAIN INJURY: ICD-10-CM

## 2022-11-08 DIAGNOSIS — Z86.73 HISTORY OF CVA (CEREBROVASCULAR ACCIDENT): ICD-10-CM

## 2022-11-08 DIAGNOSIS — F43.21 GRIEF REACTION: ICD-10-CM

## 2022-11-08 DIAGNOSIS — F06.8 COGNITIVE DEFICIT AS LATE EFFECT OF TRAUMATIC BRAIN INJURY: ICD-10-CM

## 2022-11-08 DIAGNOSIS — F33.1 DEPRESSION, MAJOR, RECURRENT, MODERATE: ICD-10-CM

## 2022-11-08 DIAGNOSIS — F06.70 MILD NEUROCOGNITIVE DISORDER DUE TO MULTIPLE ETIOLOGIES: Primary | ICD-10-CM

## 2022-11-08 PROCEDURE — 99499 NO LOS: ICD-10-PCS | Mod: 95,,, | Performed by: PSYCHIATRY & NEUROLOGY

## 2022-11-08 PROCEDURE — 99499 UNLISTED E&M SERVICE: CPT | Mod: 95,,, | Performed by: PSYCHIATRY & NEUROLOGY

## 2022-11-08 PROCEDURE — 96116 NUBHVL XM PHYS/QHP 1ST HR: CPT | Mod: 95,,, | Performed by: PSYCHIATRY & NEUROLOGY

## 2022-11-08 PROCEDURE — 96116 PR NEUROBEHAVIORAL STATUS EXAM BY PSYCH/PHYS: ICD-10-PCS | Mod: 95,,, | Performed by: PSYCHIATRY & NEUROLOGY

## 2022-11-08 NOTE — ASSESSMENT & PLAN NOTE
Level of Care: Appropriately managed as several family members provide support in complex ADLs.     Compensatory Mechanisms: She indicated that family members encourage her to write down information, but she doesn't consistently do so.     Optimize Brain Health: Prioritize physical/social/cognitive activity/stimulation. Maintain a heart healthy diet.     Follow-up: Testing on 12/5.

## 2022-11-11 ENCOUNTER — PATIENT MESSAGE (OUTPATIENT)
Dept: INTERNAL MEDICINE | Facility: CLINIC | Age: 57
End: 2022-11-11
Payer: MEDICARE

## 2022-11-14 ENCOUNTER — OFFICE VISIT (OUTPATIENT)
Dept: PSYCHIATRY | Facility: CLINIC | Age: 57
End: 2022-11-14
Payer: MEDICARE

## 2022-11-14 DIAGNOSIS — F06.70 MILD NEUROCOGNITIVE DISORDER DUE TO MULTIPLE ETIOLOGIES: ICD-10-CM

## 2022-11-14 DIAGNOSIS — F33.1 DEPRESSION, MAJOR, RECURRENT, MODERATE: Primary | ICD-10-CM

## 2022-11-14 PROCEDURE — 99211 OFF/OP EST MAY X REQ PHY/QHP: CPT | Mod: PBBFAC | Performed by: SOCIAL WORKER

## 2022-11-14 PROCEDURE — 90834 PR PSYCHOTHERAPY W/PATIENT, 45 MIN: ICD-10-PCS | Mod: 95,,, | Performed by: SOCIAL WORKER

## 2022-11-14 PROCEDURE — 90834 PSYTX W PT 45 MINUTES: CPT | Mod: 95,,, | Performed by: SOCIAL WORKER

## 2022-11-14 PROCEDURE — 99999 PR PBB SHADOW E&M-EST. PATIENT-LVL I: CPT | Mod: PBBFAC,,, | Performed by: SOCIAL WORKER

## 2022-11-14 PROCEDURE — 99999 PR PBB SHADOW E&M-EST. PATIENT-LVL I: ICD-10-PCS | Mod: PBBFAC,,, | Performed by: SOCIAL WORKER

## 2022-11-14 NOTE — PROGRESS NOTES
"Individual Psychotherapy (PhD/LCSW)    11/14/2022    Site:  [facility]         Therapeutic Intervention: Met with patient.  Outpatient - Insight oriented psychotherapy 45 min - CPT code 75732    Chief complaint/reason for encounter: depression     Interval history and content of current session: The patient location is: home in [place]  The chief complaint leading to consultation is: depression  Visit type: audiovisual  Total time spent with patient: 40 minutes  Each patient to whom he or she provides medical services by telemedicine is:  (1) informed of the relationship between the physician and patient and the respective role of any other health care provider with respect to management of the patient; and (2) notified that he or she may decline to receive medical services by telemedicine and may withdraw from such care at any time.    Notes: Follow-up with pt.  Last seen a few weeks ago with her 15 year old daughter.  They have been having major issues getting along and with daughter resisting pt's attempts to put limits on her behavior.  Daughter accuses pt of being "over-protective".  Pt reports today that things have been going much better with daughter.  She has apologized to pt for some of her behaviors and has been more accepting of pt's limits.  Pt is feeling good and positive about this now. Despite pt's limitations she is doing a good job of parenting this strong-willed teen.    Treatment plan:  Target symptoms: depression  Why chosen therapy is appropriate versus another modality: relevant to diagnosis  Outcome monitoring methods: self-report, observation  Therapeutic intervention type: insight oriented psychotherapy, supportive psychotherapy    Risk parameters:  Patient reports no suicidal ideation  Patient reports no homicidal ideation  Patient reports no self-injurious behavior  Patient reports no violent behavior    Verbal deficits: None    Patient's response to intervention:  The " patient's response to intervention is motivated.    Progress toward goals and other mental status changes:  The patient's progress toward goals is fair .    Diagnosis:     ICD-10-CM ICD-9-CM   1. Depression, major, recurrent, moderate  F33.1 296.32   2. Mild neurocognitive disorder due to multiple etiologies  F06.70 294.9       Plan:  individual psychotherapy, group psychotherapy and medication management by physician    Return to clinic: 1 month

## 2022-11-15 NOTE — TELEPHONE ENCOUNTER
Spoke to patient and advised that she should be taking  cyanocobalamin, vitamin B-12, 1,000 mcg once daily.    Patient stated that she has only been taking it once a week.     Will fu on Friday to see if she still has symptoms.

## 2022-11-15 NOTE — TELEPHONE ENCOUNTER
Started on the 7th she began to have symptoms of fatigue.     She stated she has felt like she has less energy.     Declined charges in sleep pattern.She's up during the day and sleeps at night.    No charges in medication.  Declined any stress, current grief or sadness

## 2022-11-17 ENCOUNTER — TELEPHONE (OUTPATIENT)
Dept: ENDOSCOPY | Facility: HOSPITAL | Age: 57
End: 2022-11-17
Payer: MEDICARE

## 2022-11-17 DIAGNOSIS — R11.0 NAUSEA: Primary | ICD-10-CM

## 2022-11-17 NOTE — TELEPHONE ENCOUNTER
Spoke with patient. She says she is having a lot of nausea. No pain. She has Zofran but it is not helping.

## 2022-11-17 NOTE — TELEPHONE ENCOUNTER
----- Message from Hi Fragoso sent at 11/17/2022 10:32 AM CST -----  Contact:   Pt requesting call back RE: would like to schedule appt because she is in pain, she called 6 days ago with no return call. Would like to speak today      Confirmed contact below:  Contact Name:Amna Chawla  Phone Number: [phone]

## 2022-11-19 NOTE — TELEPHONE ENCOUNTER
Spoke to patient stated that her symptoms are getting better after starting the b12 back again.  Well follow up on Wednesday  to check on pt.

## 2022-11-20 ENCOUNTER — PATIENT MESSAGE (OUTPATIENT)
Dept: NEUROLOGY | Facility: CLINIC | Age: 57
End: 2022-11-20
Payer: MEDICARE

## 2022-11-21 ENCOUNTER — TELEPHONE (OUTPATIENT)
Dept: NEUROLOGY | Facility: CLINIC | Age: 57
End: 2022-11-21
Payer: MEDICARE

## 2022-11-21 ENCOUNTER — PATIENT MESSAGE (OUTPATIENT)
Dept: INTERNAL MEDICINE | Facility: CLINIC | Age: 57
End: 2022-11-21
Payer: MEDICARE

## 2022-11-21 ENCOUNTER — TELEPHONE (OUTPATIENT)
Dept: GASTROENTEROLOGY | Facility: CLINIC | Age: 57
End: 2022-11-21
Payer: MEDICARE

## 2022-11-21 ENCOUNTER — PATIENT MESSAGE (OUTPATIENT)
Dept: GASTROENTEROLOGY | Facility: CLINIC | Age: 57
End: 2022-11-21
Payer: MEDICARE

## 2022-11-21 DIAGNOSIS — R11.0 NAUSEA: Primary | ICD-10-CM

## 2022-11-21 NOTE — TELEPHONE ENCOUNTER
Contacted patient re: nausea  GES not performed; will request assistance with scheduling check hpylori serology    Tanmay Thomas MD

## 2022-11-23 ENCOUNTER — LAB VISIT (OUTPATIENT)
Dept: LAB | Facility: HOSPITAL | Age: 57
End: 2022-11-23
Attending: INTERNAL MEDICINE
Payer: MEDICARE

## 2022-11-23 DIAGNOSIS — R11.0 NAUSEA: ICD-10-CM

## 2022-11-23 PROCEDURE — 36415 COLL VENOUS BLD VENIPUNCTURE: CPT | Performed by: INTERNAL MEDICINE

## 2022-11-23 PROCEDURE — 86677 HELICOBACTER PYLORI ANTIBODY: CPT | Performed by: INTERNAL MEDICINE

## 2022-11-25 LAB — H PYLORI IGG SERPL QL IA: NORMAL

## 2022-11-28 ENCOUNTER — PATIENT MESSAGE (OUTPATIENT)
Dept: NEUROLOGY | Facility: CLINIC | Age: 57
End: 2022-11-28
Payer: MEDICARE

## 2022-11-30 ENCOUNTER — EXTERNAL CHRONIC CARE MANAGEMENT (OUTPATIENT)
Dept: PRIMARY CARE CLINIC | Facility: CLINIC | Age: 57
End: 2022-11-30
Payer: MEDICARE

## 2022-11-30 PROCEDURE — 99439 CHRNC CARE MGMT STAF EA ADDL: CPT | Mod: PBBFAC | Performed by: INTERNAL MEDICINE

## 2022-11-30 PROCEDURE — 99490 CHRNC CARE MGMT STAFF 1ST 20: CPT | Mod: PBBFAC | Performed by: INTERNAL MEDICINE

## 2022-11-30 PROCEDURE — 99439 CHRNC CARE MGMT STAF EA ADDL: CPT | Mod: S$PBB,,, | Performed by: INTERNAL MEDICINE

## 2022-11-30 PROCEDURE — 99490 PR CHRONIC CARE MGMT, 1ST 20 MIN: ICD-10-PCS | Mod: S$PBB,,, | Performed by: INTERNAL MEDICINE

## 2022-11-30 PROCEDURE — 99439 PR CHRONIC CARE MGMT, EA ADDTL 20 MIN: ICD-10-PCS | Mod: S$PBB,,, | Performed by: INTERNAL MEDICINE

## 2022-11-30 PROCEDURE — 99490 CHRNC CARE MGMT STAFF 1ST 20: CPT | Mod: S$PBB,,, | Performed by: INTERNAL MEDICINE

## 2022-12-02 ENCOUNTER — PROCEDURE VISIT (OUTPATIENT)
Dept: NEUROLOGY | Facility: CLINIC | Age: 57
End: 2022-12-02
Payer: MEDICARE

## 2022-12-02 VITALS
HEIGHT: 64 IN | HEART RATE: 67 BPM | WEIGHT: 293 LBS | SYSTOLIC BLOOD PRESSURE: 150 MMHG | DIASTOLIC BLOOD PRESSURE: 84 MMHG | BODY MASS INDEX: 50.02 KG/M2

## 2022-12-02 DIAGNOSIS — R26.81 GAIT INSTABILITY: ICD-10-CM

## 2022-12-02 DIAGNOSIS — G43.711 CHRONIC MIGRAINE WITHOUT AURA, WITH INTRACTABLE MIGRAINE, SO STATED, WITH STATUS MIGRAINOSUS: Primary | ICD-10-CM

## 2022-12-02 PROCEDURE — 64615 CHEMODENERV MUSC MIGRAINE: CPT | Mod: PBBFAC | Performed by: PSYCHIATRY & NEUROLOGY

## 2022-12-02 PROCEDURE — 64615 CHEMODENERV MUSC MIGRAINE: CPT | Mod: S$PBB,,, | Performed by: PSYCHIATRY & NEUROLOGY

## 2022-12-02 PROCEDURE — 64615 PR CHEMODENERVATION OF MUSCLE FOR CHRONIC MIGRAINE: ICD-10-PCS | Mod: S$PBB,,, | Performed by: PSYCHIATRY & NEUROLOGY

## 2022-12-02 NOTE — PROCEDURES
Follow up:  Reports nausea   Knees buckle frequently   No benefit from new shoes   Not Using cane     Prior note:   Reports overall worse HA   Feels tremors in whole body lasting 3-4 hrs   Tried ativan - helped for a few hours     Prior note:   She is grappling with grief of loss of her sister   Pending grief counseling      Prior note:   S/p course of prednisone for GI allergic reaction (scratch throat - seafood related)     Prior note:   Migraine Hz increased during storm - almost daily   One episode of lightheadedness. No SOB, CP       Prior note:   Reports episode of lightheadedness 2 days ago      Prior note:   S/p COVID vaccine      Prior note:   Reports more physical fatigue   taking 2 naps a day   L sided severe migraine lasted 2 days. Took ubrelvy, motrin, zanaflex      Prior note:   3 days of severe L sided HA + nausea   Gradual onset   Ubrelvy, zanaflex, flurbiprofen - not helping   Vision - nml      Prior note:   HA stable   Try Ubrelvy again      Prior note:   independent left and right parietal ice prick HA      Prior note:   HA much improved   Only 2 since last visit      Prior note:   HA are more frequent   Taking 1600 mg motrin + zanaflex   Went to ER recently      Prior note:   HA overall stable in Hz and intensity   Reports a wearing off effect of BOTOX since last week   Taking zanaflex 8 mg TID        Prior note:   HA started 12/24   She feels BOTOX wore off last week   Reports GI distress from taking to many motrin      Prior note:   4/week HA - L vertex   Jabs - vertex either sides      She reports migraine that woke her up in the morning - associated with L side facial droop      Prior note:   She gets acceptable relief for 2.5 months after BOTOX      Prior note:   No recurrent stroke symptoms   starting having whole body tremors since this AM. Took 1 tablet of lorazepam   Last night had a HA, took trazodone       Admit Date: 4/11/2018  2:03 PM   Discharge Date and Time: 4/12/2018  8:30 PM    History of Present Illness: Ms. Chawla is a 51 yo F with afib on Eliquis (last dose this am), prior cardiac arrest with associated acute ischemic stroke with residual mild L sided weakness, CAD with ICD in situ, HTN, and HLD who presented with acute R sided weakness and sensation changes.  She originally called EMS for palpitations, but developed acute right sided facial droop and RUE weakness at 1:40pm today, after EMS had arrived.  She denies changes to speech or vision.  SBP en route 200/100.  She is ineligible for tPA 2/2 Eliquis use.  She is not suspected to have an LVO.  She will be admitted to  for observation overnight.     Hospital Course (synopsis of major diagnoses, care, treatment, and services provided during the course of the hospital stay): Ms. Chawla was admitted for acute stroke workup. Pt with pacemaker so unable to obtain MRI Brain; CTA H/N demonstrated no evidence acute infarction, though did exhibit noncalcified plaquing of carotid bifurcations and proximal ICAs with < 50% stenosis, so Plavix was added to pt's daily home regimen. Concerned for TIA at this time though Migraine very high on differential due to pt's hx headaches, including presently this admission. Hypertensive urgency/emergency also considered due to pt's very elevated levels with EMS. Per chart review, Eliquis was a new medication since 4/3/18 (had switched from Coumadin), however staff Faraz concerned that pt had a potential event while on Eliquis. She wished to switch to Pradaxa as an alternative DOAC (as it has an option for reversal), however changed to Xarelto per pt's insurance coverage.   While admitted, pt given cocktail for HA which she has received previously at the infusion clinic - IV Magnesium, IM Toradol, 2mg IV Morphine, 500cc NS bolus (IV Benadryl not included this time as pt had received a dose earlier that day prior to contrast imaging.) HA improved somewhat and pt was encouraged to follow up with Dr. Cortez  for continued management of botox injections, etc. At that time, pt also found with hyponatremia; d/c'd Clorthalidone and plan was made for pt to [facility]16[facility]18 for repeat CMP to evaluate Na level and BP check since discontinuing this medication.  Ms. Chawla was evaluated and treated by PT, OT, and SLP who recommend d/c with outpatient PT and OT. She was discharged home 4/12/18 with [facility] follow-up Monday      Prior note:   2 weeks ago BOTOX effect wore off   Used up all her percocet   3 wks h/o:   Reports frequent falls - falling forward, no LOC, no injuries, able to protect falls by hands   triggers - unknown   Also occ stumbling   Dragging L foot   No Pain in back or legs   No numbness or weakness in legs   No B/B incontinence   No lightheadedness      Prior note:    Flare up of TMJ and HA during daughter's wedding   NSAIDS helped   2 weeks ago BOTOX effect wore off      Prior note:   2 weeks ago BOTOX effect wore off   Has severe spasm and pain in the traps catalina   Weather change has provoked severe migraine + nausea   She started coumadin       Prior note:   3 weeks ago BOTOX effect wore off   She reports severe daily HA    During BOTOX periods she feels she  her HA. Much less intense      Prior note:   The patient is a 51-year-old female who [facility]. Since her last visit, she reports growing frustration   about the fact that nothing has helped her with regards to her headaches. She   continues to experience daily headaches. She takes mefenamic acid and   tizanidine every night, which only provides her two hours of relief. She is   unable to sleep because of the refractory headaches. She is incapacitated   because of severe headaches. She reports minimal relief with infusions that she  gets on a periodic basis. She does report an improvement overall with the   Botox. However, this effect has worn off in the  "last two weeks. She also   reports an episode wherein she fell with loss of consciousness, which was not   provoked. As a result, she injured her ankle and is currently wearing a boot.      Prior note:   since last week - Reports vertigo for 6 - 7 minutes upto 3 times daily   Nearly daily severe HA - no response to ponstel , compazine   She is late for her BOTOX - last 2 weeks were painful       Follow up:   R sided Headache is constant max at TMJ on R   Prior note:   She reports new episodes of R face tingling. Sh also reports 2 episodes of blurred vision lasting 15 minutes. These hapended while watching TV. Her pain remains unchanged   Follow up:   2 days of severe HA during Thanksgiving   Pounding bifrontal region   Pain worse over L eye brow   Follow up:   Reports bifrontal severe HA (worse on L) with no nausea with sudden onset . Occurs daily   NO note:  I found no papilledema or other changes to suggest elevated intracranial pressure or other alternative etiology for her headaches. Repeat exam in two years.  HA are less frequent and less intense.   (R levator scapula spasm)   symptoms better with lateral flexion to L   Prior note:   She still has daily HA with severe sharp stabbing pain behind L eye lasting for 15 sec   Prior note:   Daily pain. 2/week - nausea.  Shu Lares RN at 9/17/2014 4:53 PM  Pt presented to clinic for medical advice. Pt is seen by Dr. Paredes and Dr. Cortez.  1) She went ER on 9/13/14 for a migraine. She was given Reglan, Benadryl, MSO4 and IVF. A couple days later she developed an all over body "tremor". She states "I think I have Parkinson's". - Per Dr. Paredes, medication related tremor (Reglan & Benadryl). Informed her it should wear off in a few days. If not, she is to call and let us know.  2) Headaches - triggered by insomnia.   Current meds:  Ketoprofen - she took it once and said her "throat closed up" and she's not supposed to have anything with aspirin in " "it.  Nortriptyline - she was taking it at night, but it didn't help her sleep, so she stopped it.  Per Dr. Cortez, discontinue Ketoprofen and Nortriptyline. Start Effexor XR 37.5mg QD, tizanidine 4mg BID PRN headache and Klonopin 0.25 - 0.5mg QHS PRN. Will schedule pt for sphenocath procedure within the next week.   Prior note:   48 yo woman with history of HTN and asthma, both reportedly controlled, in hospital early 2013 after "passed out" and became unresponsive. CPR in the field for 8 minutes until EMS arrived. Per ED note, on arrival she was found to be in PEA, given epinephrine. Vfib/Vtach was achieved which was shocked x1. Patient converted to sinus tachycardia after shock. I do not know the time course for the above treatment. On arrival to facility patient was in sinus tachycardia with strong pulses, intubated and unresponsive. ET tube placement was confirmed with direct laryngoscopy and good bilateral breath sounds were heard after the tube was pulled back 2 cm. Reported to have "withdrawal" movements in ER during stabilization, then sedated and cooling protocol initiated. Had remarkable   recovery and first seen in clinic in April 2013.   Headache history: cluster   Age of onset - 2012   Location - behind L eye   Nature of pain - sharp   Prodrome - no   Aura - No   Duration of headache - 6-7 min   Time to peak intensity -   Pain scale - range of intensity - 9/10   Frequency - daily   Status Migrainosus history - 1-3 days   Time of day of most headaches- anytime   Associated symptoms with the headache:   Red eyes   swelling of L face   Headache history: Migraine   Age of onset - 2012   Location - bifrontal   Nature of pain - Pounding   Prodrome - no   Aura - No   Duration of headache - > 4 hrs   Time to peak intensity -   Pain scale - range of intensity - 9/10   Frequency - 5/week   Status Migrainosus history - 1-3 days   Time of day of most headaches- anytime   Associated symptoms with the headache: " "  Meningeal symptoms - photophobia, phonophobia, exercise intolerance +   Nausea/vomitting +   Nasal drainage   Visual blurriness +   Pallor/flushing   Dizziness   Vertigo   Confusion   Impaired concentration +   Pain worsened with physical activity +   Neck pain +   Symptoms of increased intracranial pressure:   Whooshing sounds - no   Visual spots/blotches - no   Headache Triggers: unknown   Other comorbid conditions:   Anxiety - no   Motion sickness symptom - no       Treatment history:   Resolution of headache with sleep - yes       Meds tried:   Trigger points involvin/9/15 helped for 1 day   Site: Right   Levator scapula   Pharmacological agent:   0.5% Sensorcaine solution   No complications were noted.  Supraorbital block - helped for 2 days   Tylenol - not help   imitrex - chest tightness   alleve - help   topamax - not helping   Neurontin - not helping   Paxil, Elavil - not help   seroquel - nightmare   Ketoprofen - she took it once and said her "throat closed up" and she's not supposed to have anything with aspirin in it.  Flurbiprofen - constipation   Nortriptyline - she was taking it at night, but it didn't help her sleep, so she stopped it.  reglan - parkinsonism   zanaflex - help   Toradol 30 mg IM inj at home - too expensive   BOTOX round #1 9/3/15 - helped (R levator scapula spasm)   Round #2 12/3/15 - helped   Round#3 3/316 - helped   Round #4 16 - helped   BOTOX#5 9/15/16 - helped     BOTOX#6 - 16 - helped   BOTOX#7 - 3/9/17 - helped    BOTOX#8 - 17 - helped    BOTOX#9 8/10/17 - helped   BOTOX#10 10/19/17 - helped   BOTOX#11 17 - helped   BOTOX#12 3/7/18 - helped   BOTOX#13 18 - helped    BOTOX#14 18 - helped     BOTOX#15 10/19/18 - helped      BOTOX#16 18 - helped   BOTOX#17 3/13/19 - helped    BOTOX#18 19 - helped     BOTOX#19 19 - helped      BOTOX#20 10/11/19 - helped     BOTOX#21 19 - helped   BOTOX#22 3/10/20 - helped    BOTOX#23 20 " - helped   BOTOX#24 11/12/20 - helped  BOTOX#25 1/21/21 - helped   BOTOX#26 4/22/21 - helped   BOTOX#27 7/6/21 - helped    BOTOX#28 12/10/21 - helped     BOTOX#29 5/19/22 - helped   BOTOX#30 8/25/22 - helped    AIMOVIG (sept 1rst week, Oct first week, Nov first wk 140 mg, Dec first wk 140 mg, Jan, Feb) no benefit   Constipation +      Can not do RFA - pt has pacemaker   Procedure: IOVERA peripheral nerve focus cold therapy (non ablative cryotherapy) 12/30/15 - not help   Indication: Cryotherapy of select peripheral nerves of the head was performed to treat chronic headaches that failed to respond to several preventive pharmacological therapies.   Sedation: None   Informed consent: The procedure, risks, benefits and options were discussed with the patient. There are no contraindications to the procedure. The patient expressed understanding and agreed to the procedure. I verify that I personally obtained the patient's consent prior to the start of the procedure.  Location:  Bilateral Supra orbital nerve (3 cycles on R and 1 cycle on L)   Bilateral Occipital nerve   3 cycles   Pain relief: Pain score reduced 10/10->0/10   9/26 Bilateral Sphenopalatine Ganglion and bilateral Maxillary Branch (Trigeminal Nerve) Block - no help   ONB - not help     georges Pagan RN at 12/31/2014 3:54 PM                           Status: Signed                                       Expand All Collapse All   HEADACHE FASTTRACK  PAIN 7/10 NAUSEA 0/10  ROUND 1: TORADOL, IMITREX, MORPHINE, BENADRYL, AND MAGNESIUM  PAIN 7/10 NAUSEA 0/10  PT STATED SHE WAS READY TO GO HOME AND GO TO SLEEP. PT D/C'ED IN [facility]                              Shannon Pagan RN at 10/5/2015 12:49 PM                           Status: Signed                                       Expand All Collapse All   HEADACHE FASTTRACK  PAIN 8/10 NAUSEA 10/10  FIRST ROUND: tORADOL, BENADRYL, COMPAZINE, IMITREX, MAGNESIUM, AND VALPROATE.  PAIN 1/10 NAUSEA 0/10  PT READY TO GO HOME AND  FEELING BETTER. PT LEFT IN NAD WITH FAMILY MEMBER  TOTAL TIME: 8708-6509                         Shannon Pagan RN at 10/20/2015 3:05 PM                           Status: Signed                                       Expand All Collapse All   HEADACHE FASTTRACK  PAIN 10/10 NAUSEA 0/10  FIRST ROUND: tORADOL, BENADRYL, COMPAZINE, IMITREX, MAGNESIUM, AND VALPROATE.  BP BEING MONITORED EVERY 15 MIN AND REMAINED 170'S/80-90'S. DR CHOPRA NOTIFIED AND STATED TO MAKE SURE BP DID NOT EXCEED 180 SYSTOLIC OR PT SHOULD REPORT TO ED. BP AT 1500 183/92. DR CHOPRA NOTIFIED AND GAVE ORDER TO STOP INFUSION AND SEND PATIENT TO ED. PT BROUGHT TO ED BY INFUSION NURSE VIA WHEELCHAIR.   PAIN 8/10 NAUSEA 0/10  TOTAL TIME: 6071-0928                                                     Progress Notes                                               Shannon Pagan RN at 2/24/2016 1:36 PM       Status: Signed                  Expand All Collapse All   HEADACHE FASTTRACK  PAIN 10/10 NAUSEA 7/10  FIRST ROUND: tORADOL, BENADRYL, AND MORPHINE-BP RANGING FROM 177-203/84-92, HR 60-82  MD NOTIFIED AND GAVE ORDERS TO SEND TO ED. PT LEFT VIA W/C WITH INFUSION NURSE ON WAY TO ED.            Headache risk factors:   H/o TBI - [facility] (2013) + neck sprain   H/o Meningitis - no   F/h Aneurysms - no       Review of Systems   Constitutional: Negative.   HENT: Negative.   Eyes: Negative.   Respiratory: Negative.   Cardiovascular: Negative.   Gastrointestinal: Negative.   Endocrine: Negative.   Genitourinary: Negative.   Musculoskeletal: Negative.   Skin: Negative.   Allergic/Immunologic: Negative.   Hematological: Negative.   Psychiatric/Behavioral: insomnia      Objective:     Physical Exam   Constitutional: She is oriented to person, place, and time. She appears well-developed.   obesity   Psychiatric: She has a normal mood and flat affect. Her behavior is normal. Judgment and thought content normal.   Headache Exam:  Optic disc is flat OU   Temples appear symmetric  No  V1,V2,V3 distribution allodynia/hyperalgesia catalina   No facial swelling  No gross TMJ abnormalities   No ptosis   No conj injection   No meningismus   No neck stiffness  No C spine tenderness  Cervical facet tenderness on palpation catalina   No tender points over trapezium   catalina supraorbital nerve exit point tenderness  catalina occipital nerve exit point tenderness  No auriculotemporal nerve tenderness   Tenderness of levator scapula catalina    Gait - wide based gait                       Assessment:                              1.  Occipital neuralgia                              2.  Cervicalgia                              3.  4  5  6 Chronic migraine without aura, with intractable migraine, so stated, with status migrainosus (post traumatic) post concussive?  Depression   TMJ - R sided   Insomnia - SHIRA      Head CT  Findings: There is no evidence of acute or recent major vascular territory cerebral infarction, parenchymal hemorrhage, or intra-axial mass.  There are no extra-axial masses or abnormal fluid collections.  The ventricular system is within normal limits of size for age and shows no distortion by mass-effect or evidence of hydrocephalus.  There is no fracture or destructive osseous lesion.  The extracranial soft tissues are unremarkable.  The visualized paranasal sinuses and mastoid air cells are clear.   Impression    No acute intracranial abnormality.  ______________________________________     Electronically signed by resident: KRISSY MAYERS MD  Date: 03/14/16  Time: 17:09            Plan:                              1. Prophylactic medication -   Despiramine 25 mg AM (for dizziness) PRN  zoloft 25 mg daily    Aricept 20 mg daily   Neurontin 900 mg TID    Magnesium 200 mg daily  Topamax 200 mg BID   Clonazepam BID for anxiety PRN  On trazodone   Cont B2, B6 supplements  On bellsomra    2, Breakthrough headache - zanaflex 8 mg Q8, etodolac  Multiple alternative treatment options were offered to the patient   3.  Refrain from over counter pain medications. Discussed medication overuse headache.cont Magnesium 400 mg PO BID   4. Occipital neuralgia - If medical management is ineffective may consider occipital nerve blocks.   5. I again urged the patient to keep a headache calender.    f/up Dr. Rock for TBI   Vit D supplements    f/up sleep clinic - CPAP - waiting for new machine   Refer to PMR for gait assessment   Cont AJOVY (April 2019- ) - gap Feb-April 2021)  No side effects      ONB 2 weeks prior to next BOTOX       BOTOX was performed as an indicated therapy for intractable chronic migraine headaches given that the patient failed > 5 prophylactic medications  Botulinum Toxin Injection Procedure   Pre-operative diagnosis: Chronic migraine   Post-operative diagnosis: Same   Procedure: Chemical neurolysis   After risks and benefits were explained including bleeding, infection, worsening of pain, damage to the areas being injected, weakness of muscles, loss of muscle control, dysphagia if injecting the head or neck, facial droop if injecting the facial area, painful injection, allergic or other reaction to the medications being injected, and the failure of the procedure to help the problem, a signed consent was obtained.   The patient was placed in a comfortable area and the sites to be treated were identified.The area to be treated was prepped three times with alcohol and the alcohol allowed to dry. Next, a 30 gauge needle was used to inject the medication in the area to be treated.   Area(s) injected:   Total Botox used: 160 Units   Botox wastage: 40 Units   Injection sites:     muscle bilaterally ( a total of 5 units divided into 2 sites)   Procerus muscle (5 units)   Frontalis muscle bilaterally (a total of 20 units divided into 4 sites)   Temporalis muscle bilaterally (a total of 50 units divided into 8 sites)   Occipitalis muscle bilaterally (a total of 30 units divided into 6 sites)   Cervical paraspinal  muscles (a total of 20 units divided into 4 sites)   Trapezius muscle bilaterally (a total of 30 units divided into 6 sites)   Masseter 5 units catalina      Complications: none       RTC for the next Botox injection: 10 weeks    Procedures

## 2022-12-08 ENCOUNTER — TELEPHONE (OUTPATIENT)
Dept: GASTROENTEROLOGY | Facility: CLINIC | Age: 57
End: 2022-12-08
Payer: MEDICARE

## 2022-12-08 ENCOUNTER — HOSPITAL ENCOUNTER (OUTPATIENT)
Dept: RADIOLOGY | Facility: HOSPITAL | Age: 57
Discharge: HOME | End: 2022-12-08
Attending: INTERNAL MEDICINE
Payer: MEDICARE

## 2022-12-08 DIAGNOSIS — R11.0 NAUSEA: ICD-10-CM

## 2022-12-08 PROCEDURE — 78264 GASTRIC EMPTYING IMG STUDY: CPT | Mod: 26,,, | Performed by: RADIOLOGY

## 2022-12-08 PROCEDURE — A9541 TC99M SULFUR COLLOID: HCPCS

## 2022-12-08 PROCEDURE — 78264 NM GASTRIC EMPTYING: ICD-10-PCS | Mod: 26,,, | Performed by: RADIOLOGY

## 2022-12-08 NOTE — TELEPHONE ENCOUNTER
----- Message from Tanmay Thomas MD sent at 12/8/2022  4:33 PM CST -----  Please let her know gastric emptying scan is negative / normal

## 2022-12-08 NOTE — TELEPHONE ENCOUNTER
Patient was called to give result on gastric emptying. Patient also wanted to let Dr. Thomas know that she will discontinue her use of Zoloft because she has seen a positive difference now that she has not been taking it.  A message was sent to Dr. Thomas.

## 2022-12-16 ENCOUNTER — OFFICE VISIT (OUTPATIENT)
Dept: URGENT CARE | Facility: CLINIC | Age: 57
End: 2022-12-16
Payer: MEDICARE

## 2022-12-16 VITALS
SYSTOLIC BLOOD PRESSURE: 138 MMHG | HEIGHT: 64 IN | HEART RATE: 89 BPM | RESPIRATION RATE: 20 BRPM | OXYGEN SATURATION: 100 % | TEMPERATURE: 97 F | WEIGHT: 293 LBS | BODY MASS INDEX: 50.02 KG/M2 | DIASTOLIC BLOOD PRESSURE: 84 MMHG

## 2022-12-16 DIAGNOSIS — H65.01 NON-RECURRENT ACUTE SEROUS OTITIS MEDIA OF RIGHT EAR: Primary | ICD-10-CM

## 2022-12-16 DIAGNOSIS — R68.83 CHILLS: ICD-10-CM

## 2022-12-16 DIAGNOSIS — J02.9 SORE THROAT: ICD-10-CM

## 2022-12-16 LAB
CTP QC/QA: YES
CTP QC/QA: YES
POC MOLECULAR INFLUENZA A AGN: NEGATIVE
POC MOLECULAR INFLUENZA B AGN: NEGATIVE
SARS-COV-2 AG RESP QL IA.RAPID: NEGATIVE

## 2022-12-16 PROCEDURE — 87502 POCT INFLUENZA A/B MOLECULAR: ICD-10-PCS | Mod: QW,S$GLB,, | Performed by: PHYSICIAN ASSISTANT

## 2022-12-16 PROCEDURE — 99213 PR OFFICE/OUTPT VISIT, EST, LEVL III, 20-29 MIN: ICD-10-PCS | Mod: S$GLB,,, | Performed by: PHYSICIAN ASSISTANT

## 2022-12-16 PROCEDURE — 87811 SARS CORONAVIRUS 2 ANTIGEN POCT, MANUAL READ: ICD-10-PCS | Mod: QW,CR,S$GLB, | Performed by: PHYSICIAN ASSISTANT

## 2022-12-16 PROCEDURE — 87811 SARS-COV-2 COVID19 W/OPTIC: CPT | Mod: QW,CR,S$GLB, | Performed by: PHYSICIAN ASSISTANT

## 2022-12-16 PROCEDURE — 99213 OFFICE O/P EST LOW 20 MIN: CPT | Mod: S$GLB,,, | Performed by: PHYSICIAN ASSISTANT

## 2022-12-16 PROCEDURE — 87502 INFLUENZA DNA AMP PROBE: CPT | Mod: QW,S$GLB,, | Performed by: PHYSICIAN ASSISTANT

## 2022-12-16 RX ORDER — CEPHALEXIN 500 MG/1
500 CAPSULE ORAL EVERY 8 HOURS
Qty: 30 CAPSULE | Refills: 0 | Status: SHIPPED | OUTPATIENT
Start: 2022-12-16 | End: 2022-12-26

## 2022-12-16 NOTE — PATIENT INSTRUCTIONS
Take the antibiotics as prescribed with food. Avoid using qtips. Use tylenol/ibuprofen as needed for pain relief/fevers. If your symptoms should worsen please return to the urgent care or ED as needed.

## 2022-12-16 NOTE — PROGRESS NOTES
"Subjective:       Patient ID: Amna Chawla is a 57 y.o. female.    Vitals:  height is 5' 4" (1.626 m) and weight is 144.7 kg (319 lb) (abnormal). Her temperature is 96.8 °F (36 °C). Her blood pressure is 138/84 and her pulse is 89. Her respiration is 20 and oxygen saturation is 100%.     Chief Complaint: Sore Throat    Sore Throat   This is a new problem. The current episode started in the past 7 days (x4 days). The problem has been gradually worsening. There has been no fever. The pain is at a severity of 10/10. The pain is severe. Associated symptoms include ear pain and trouble swallowing. Pertinent negatives include no abdominal pain, congestion, coughing, diarrhea, drooling, ear discharge, headaches, hoarse voice, plugged ear sensation, neck pain, shortness of breath, stridor, swollen glands or vomiting. She has tried NSAIDs for the symptoms. The treatment provided no relief.     Constitution: Positive for chills. Negative for appetite change, sweating, fatigue and fever.   HENT:  Positive for ear pain, sore throat and trouble swallowing. Negative for ear discharge, drooling, mouth sores, tongue pain, tongue lesion, congestion, postnasal drip, sinus pain, sinus pressure and voice change.    Neck: Negative for neck pain, neck stiffness and painful lymph nodes.   Cardiovascular:  Negative for chest pain and sob on exertion.   Eyes:  Negative for eye discharge and eye itching.   Respiratory:  Negative for chest tightness, cough, sputum production, shortness of breath and stridor.    Gastrointestinal:  Negative for abdominal pain, vomiting and diarrhea.   Musculoskeletal:  Negative for pain, muscle cramps and muscle ache.   Skin:  Negative for color change, pale and rash.   Neurological:  Negative for dizziness, headaches, disorientation and altered mental status.   Hematologic/Lymphatic: Negative for swollen lymph nodes.   Psychiatric/Behavioral:  Negative for altered mental status, disorientation, confusion " and agitation.    Past Medical History:   Diagnosis Date    Anticoagulant long-term use     Anxiety     Asthma     Atrial fibrillation     Brain anoxic injury     Cervicalgia 8/28/2014    CHI (closed head injury) 2/19/2013    Convulsion 5/30/2015    Decreased ROM of left shoulder 4/12/2017    Defibrillator activation 2013    Depression     Heart block     History of sudden cardiac arrest 2/2013    PEA arrest with subsequent long-QT    Hx of psychiatric care     Hypertension     Left atrial enlargement 4/11/2018    Pacemaker 2013    Paresthesia 11/1/2013    Prolonged Q-T interval on ECG 2/8/2013    Psychiatric problem     Seizures     Sleep difficulties     Stroke     weakness lt side    Therapy     Thyroid disease     Upper airway resistance syndrome 2/21/2017       Past Surgical History:   Procedure Laterality Date    breast reduction      CARDIAC DEFIBRILLATOR PLACEMENT      CARDIAC DEFIBRILLATOR PLACEMENT      CARPAL TUNNEL RELEASE Right     COLONOSCOPY N/A 5/7/2019    Procedure: COLONOSCOPY;  Surgeon: Juan Coffey MD;  Location: [facility] (2ND FLR);  Service: Endoscopy;  Laterality: N/A;  per DR. Bustamante Pt to have balloon with DR. Coffey due to excesive looping, could not reach cecum - see telephone encounter 3/8/19 and last procedure report dated 6/24/14/ Per Piedad schedule as 90 min case- ERW  pacemaker/AICD Boitronik/   per , ok to hold Xareltox 2 days    COLONOSCOPY N/A 6/2/2022    Procedure: COLONOSCOPY;  Surgeon: Juan Coffey MD;  Location: [facility] (2ND FLR);  Service: Endoscopy;  Laterality: N/A;  combined orders    ESOPHAGOGASTRODUODENOSCOPY N/A 6/2/2022    Procedure: EGD (ESOPHAGOGASTRODUODENOSCOPY);  Surgeon: Juan Coffey MD;  Location: [facility] (2ND FLR);  Service: Endoscopy;  Laterality: N/A;  BMI 54; pt requests 1st available [facility]  Fully vaccinated, Hold Xarelto x 2 days per Dr. Mejia and Plavix x 5 days per Dr. Grossman see telephone encounter 4/25/22, Biotronik PM/AICD, prep instr  portal and mailed -ml    HERNIA REPAIR      HIATAL HERNIA REPAIR      INSERT / REPLACE / REMOVE PACEMAKER  10/2017    CRT-D upgrade    REVISION OF IMPLANTABLE CARDIOVERTER-DEFIBRILLATOR (ICD) ELECTRODE LEAD PLACEMENT Left 12/7/2020    Procedure: REVISION, INSERTION, ELECTRODE LEAD, ICD;  Surgeon: Avelino Mejia MD;  Location: [facility] EP LAB;  Service: Cardiology;  Laterality: Left;    REVISION OF SKIN POCKET FOR CARDIOVERTER-DEFIBRILLATOR  12/7/2020    Procedure: Revision, Skin Pocket, For Cardioverter-Defibrillator;  Surgeon: Avelino Mejia MD;  Location: [facility] EP LAB;  Service: Cardiology;;    TOTAL REDUCTION MAMMOPLASTY      TRANSESOPHAGEAL ECHOCARDIOGRAPHY N/A 12/7/2020    Procedure: ECHOCARDIOGRAM, TRANSESOPHAGEAL;  Surgeon: Ivory Goodman MD;  Location: [facility] EP LAB;  Service: Cardiology;  Laterality: N/A;    TRANSESOPHAGEAL ECHOCARDIOGRAPHY N/A 12/7/2020    Procedure: ECHOCARDIOGRAM, TRANSESOPHAGEAL;  Surgeon: Patrick Diagnostic Provider;  Location: [address] FLR;  Service: Cardiology;  Laterality: N/A;    TRIGGER FINGER RELEASE Left 8/2/2019    Procedure: RELEASE, TRIGGER FINGER left middle;  Surgeon: Matt Pugh Jr., MD;  Location: [facility];  Service: Plastics;  Laterality: Left;    TRIGGER FINGER RELEASE Right 2/11/2020    Procedure: RELEASE, TRIGGER FINGER middle;  Surgeon: Matt Pugh Jr., MD;  Location: [facility];  Service: Plastics;  Laterality: Right;    TUBAL LIGATION         Family History   Problem Relation Age of Onset    Hypertension Mother     Glaucoma Maternal Grandmother     Glaucoma Paternal Grandmother     COPD Other     Heart failure Other        Social History     Socioeconomic History    Marital status: Single   Occupational History     Employer: [employer]   Tobacco Use    Smoking status: Never     Passive exposure: Never    Smokeless tobacco: Never   Substance and Sexual Activity    Alcohol use: Yes     Comment: wine, socially    Drug use: No    Sexual activity:  Not Currently   Social History Narrative    Now on disability     Social Determinants of Health     Financial Resource Strain: Low Risk     Difficulty of Paying Living Expenses: Not very hard   Food Insecurity: No Food Insecurity    Worried About Running Out of Food in the Last Year: Never true    Ran Out of Food in the Last Year: Never true   Transportation Needs: Unmet Transportation Needs    Lack of Transportation (Medical): Yes    Lack of Transportation (Non-Medical): Yes   Physical Activity: Insufficiently Active    Days of Exercise per Week: 2 days    Minutes of Exercise per Session: 20 min   Stress: Stress Concern Present    Feeling of Stress : Rather much   Social Connections: Moderately Isolated    Frequency of Communication with Friends and Family: More than three times a week    Frequency of Social Gatherings with Friends and Family: Three times a week    Attends Anglican Services: Never    Active Member of Clubs or Organizations: No    Attends Club or Organization Meetings: 1 to 4 times per year    Marital Status: Never    Housing Stability: High Risk    Unable to Pay for Housing in the Last Year: Yes    Number of Places Lived in the Last Year: 1    Unstable Housing in the Last Year: No       Current Outpatient Medications   Medication Sig Dispense Refill    acetaminophen (TYLENOL) 325 MG tablet Take 2 tablets (650 mg total) by mouth every 6 (six) hours as needed for Pain. 30 tablet 0    amLODIPine (NORVASC) 5 MG tablet Take 1 tablet (5 mg total) by mouth once daily. 90 each 3    ARIPiprazole (ABILIFY) 15 MG Tab Take 1 tablet (15 mg total) by mouth once daily. 90 tablet 1    blood sugar diagnostic Strp 1 strip by Misc.(Non-Drug; Combo Route) route 2 (two) times daily. 200 strip 3    blood-glucose meter kit Please provide with insurance covered meter 1 each 0    carvediloL (COREG) 25 MG tablet TAKE 1 TABLET(25 MG) BY MOUTH TWICE DAILY WITH MEALS 60 tablet 11    clonazePAM (KLONOPIN) 1 MG tablet  TAKE 1 TABLET BY MOUTH DAILY AS NEEDED FOR ANXIETY 30 tablet 2    clopidogreL (PLAVIX) 75 mg tablet Take 1 tablet (75 mg total) by mouth once daily. 90 tablet 3    cyanocobalamin, vitamin B-12, 1,000 mcg Subl Place 1,000 mcg under the tongue once daily. 90 tablet 3    diclofenac sodium (VOLTAREN) 1 % Gel Apply 2 g topically 4 (four) times daily. 100 g 1    donepezil (ARICEPT) 10 MG tablet Take 1 tablet (10 mg total) by mouth 2 (two) times daily. 180 tablet 3    etodolac (LODINE) 500 MG tablet Take 1 tablet (500 mg total) by mouth 2 (two) times daily as needed (migraine). 30 tablet 12    flurbiprofen (ANSAID) 100 MG tablet Take 1 tablet (100 mg total) by mouth 2 (two) times daily as needed (migraine). 12 tablet 12    fremanezumab-vfrm (AJOVY) 225 mg/1.5 mL injection Inject 1.5 mLs (225 mg total) into the skin every 28 days. 1 each 12    furosemide (LASIX) 20 MG tablet Take 1 tablet (20 mg total) by mouth every other day. 30 tablet 6    gabapentin (NEURONTIN) 300 MG capsule Take 3 capsules (900 mg total) by mouth 3 (three) times daily. 810 capsule 12    hydrOXYzine HCL (ATARAX) 25 MG tablet Take 1 tablet (25 mg total) by mouth 3 (three) times daily as needed for Itching. 20 tablet 0    lancets Misc 1 Device by Misc.(Non-Drug; Combo Route) route 2 (two) times daily. 200 each 3    losartan (COZAAR) 100 MG tablet Take 1 tablet (100 mg total) by mouth once daily. 90 tablet 3    magnesium 200 mg Tab Take 200 mg by mouth once daily. (Patient taking differently: Take 200 mg by mouth every other day.) 30 each 12    mupirocin (BACTROBAN) 2 % ointment Apply to affected area 3 times daily 22 g 1    omeprazole (PRILOSEC) 40 MG capsule Take 1 capsule (40 mg total) by mouth once daily. Take 30 minutes before breakfast 90 capsule 6    ondansetron (ZOFRAN-ODT) 4 MG TbDL Take 1 tablet (4 mg total) by mouth every 12 (twelve) hours as needed (nausea). 40 tablet 12    rivaroxaban (XARELTO) 20 mg Tab TAKE 1 TABLET BY MOUTH DAILY EVENING  MEAL WITH DINNER 30 tablet 11    rosuvastatin (CRESTOR) 40 MG Tab Take 1 tablet (40 mg total) by mouth every evening. 90 tablet 3    sertraline (ZOLOFT) 100 MG tablet Take 1.5 tablets (150 mg total) by mouth once daily. 135 tablet 3    silver sulfADIAZINE 1% (SILVADENE) 1 % cream Apply topically 2 (two) times daily. 50 g 0    suvorexant (BELSOMRA) 10 mg Tab Take 1 tablet by mouth nightly as needed (insomnia). 15 tablet 1    tiaGABine (GABITRIL) 4 MG tablet Take 1 tablet (4 mg total) by mouth every evening. 30 tablet 12    tiZANidine (ZANAFLEX) 4 MG tablet TAKE 1 TABLET(4 MG) BY MOUTH EVERY 6 HOURS AS NEEDED 90 tablet 12    traZODone (DESYREL) 100 MG tablet Take 1 tablet (100 mg total) by mouth every evening. 30 tablet 11    triamcinolone acetonide 0.1% (KENALOG) 0.1 % ointment Apply topically 2 (two) times daily. 30 g 1    TRUE METRIX GLUCOSE METER Misc TEST BG BID  0    ubrogepant (UBROGEPANT) 100 mg tablet Take 1 tablet (100 mg total) by mouth 2 (two) times daily as needed for Migraine. 10 tablet 12    zolpidem (AMBIEN) 10 mg Tab Take 1 tablet (10 mg total) by mouth nightly as needed (insomnia). 30 tablet 5    cetirizine (ZYRTEC) 10 MG tablet Take 1 tablet (10 mg total) by mouth once daily. for 7 days 30 tablet 0    EPINEPHrine (EPIPEN) 0.3 mg/0.3 mL AtIn Inject 0.3 mLs (0.3 mg total) into the muscle once. for 1 dose 0.3 mL 0    famotidine (PEPCID) 20 MG tablet Take 1 tablet (20 mg total) by mouth 2 (two) times daily. for 7 days 14 tablet 0     Current Facility-Administered Medications   Medication Dose Route Frequency Provider Last Rate Last Admin    ketorolac injection 60 mg  60 mg Intramuscular 1 time in Clinic/HOD David Cortez MD        ketorolac injection 60 mg  60 mg Intramuscular 1 time in Clinic/HOD David Cortez MD        onabotulinumtoxina injection 200 Units  200 Units Intramuscular Q90 Days David Cortez MD   200 Units at 10/19/18 1713    onabotulinumtoxina injection 200 Units  200 Units  Intramuscular Q90 Days David Cortez MD   200 Units at 12/28/18 1106    onabotulinumtoxina injection 200 Units  200 Units Intramuscular Q90 Days David Cortez MD   200 Units at 03/13/19 1504    onabotulinumtoxina injection 200 Units  200 Units Intramuscular Q90 Days David Cortez MD   200 Units at 05/23/19 1123    onabotulinumtoxina injection 200 Units  200 Units Intramuscular Q90 Days David Cortez MD   200 Units at 10/11/19 1112    onabotulinumtoxina injection 200 Units  200 Units Intramuscular Q90 Days David Cortez MD   200 Units at 12/19/19 1036    onabotulinumtoxina injection 200 Units  200 Units Intramuscular Q10 weeks David Cortez MD   200 Units at 03/10/20 1036    onabotulinumtoxina injection 200 Units  200 Units Intramuscular Q90 Days David Cortez MD   200 Units at 06/26/20 1538    triamcinolone acetonide injection 40 mg  40 mg Intramuscular 1 time in Clinic/HOD INOCENCIA [place]         Facility-Administered Medications Ordered in Other Visits   Medication Dose Route Frequency Provider Last Rate Last Admin    lactated ringers infusion   Intravenous Continuous Humberto Angel MD   Stopped at 02/11/20 0801    lidocaine (PF) 10 mg/ml (1%) injection 5 mg  0.5 mL Intradermal Once Humberto Angel MD           Review of patient's allergies indicates:   Allergen Reactions    Aspirin Hives    Imitrex [sumatriptan] Palpitations    Penicillins Hives and Swelling    Shellfish containing products Anaphylaxis     seafood    Reglan [metoclopramide hcl] Other (See Comments)     Parkinsonism          Objective:      Physical Exam   Constitutional: She is oriented to person, place, and time. She appears well-developed. She is cooperative.  Non-toxic appearance. She does not appear ill. No distress.   HENT:   Head: Normocephalic and atraumatic.   Ears:   Right Ear: Hearing, external ear and ear canal normal. Tympanic membrane is erythematous and bulging. A middle ear effusion is present. impacted  cerumen  Left Ear: Hearing, external ear and ear canal normal. Tympanic membrane is not erythematous and not bulging. A middle ear effusion is present. impacted cerumen  Nose: Nose normal. No mucosal edema, rhinorrhea, nasal deformity or congestion. No epistaxis. Right sinus exhibits no maxillary sinus tenderness and no frontal sinus tenderness. Left sinus exhibits no maxillary sinus tenderness and no frontal sinus tenderness.   Mouth/Throat: Uvula is midline, oropharynx is clear and moist and mucous membranes are normal. No trismus in the jaw. Normal dentition. No uvula swelling. No oropharyngeal exudate, posterior oropharyngeal edema or posterior oropharyngeal erythema.   Eyes: Conjunctivae and lids are normal. Right eye exhibits no discharge. Left eye exhibits no discharge. No scleral icterus.   Neck: Trachea normal and phonation normal. Neck supple. No edema present. No erythema present. No neck rigidity present.   Cardiovascular: Normal rate, regular rhythm, normal heart sounds and normal pulses.   No murmur heard.Exam reveals no gallop.   Pulmonary/Chest: Effort normal and breath sounds normal. No stridor. No respiratory distress. She has no decreased breath sounds. She has no wheezes. She has no rhonchi. She has no rales.   Abdominal: Normal appearance.   Musculoskeletal:      Cervical back: She exhibits tenderness.   Lymphadenopathy:     She has no cervical adenopathy.   Neurological: She is alert and oriented to person, place, and time. She exhibits normal muscle tone. Coordination normal.   Skin: Skin is warm, dry, intact, not diaphoretic, not pale and no rash.   Psychiatric: Her speech is normal and behavior is normal. Judgment and thought content normal.   Nursing note and vitals reviewed.  Results for orders placed or performed in visit on 12/16/22   SARS Coronavirus 2 Antigen, POCT Manual Read   Result Value Ref Range    SARS Coronavirus 2 Antigen Negative Negative     Acceptable Yes     POCT Influenza A/B MOLECULAR   Result Value Ref Range    POC Molecular Influenza A Ag Negative Negative, Not Reported    POC Molecular Influenza B Ag Negative Negative, Not Reported     Acceptable Yes      *Note: Due to a large number of results and/or encounters for the requested time period, some results have not been displayed. A complete set of results can be found in Results Review.           Assessment:       1. Non-recurrent acute serous otitis media of right ear    2. Sore throat    3. Chills            Plan:       Results reviewed  Non-recurrent acute serous otitis media of right ear  -     cephALEXin (KEFLEX) 500 MG capsule; Take 1 capsule (500 mg total) by mouth every 8 (eight) hours. for 10 days  Dispense: 30 capsule; Refill: 0    Sore throat  -     SARS Coronavirus 2 Antigen, POCT Manual Read    Chills  -     POCT Influenza A/B MOLECULAR         Patient Instructions   Take the antibiotics as prescribed with food. Avoid using qtips. Use tylenol/ibuprofen as needed for pain relief/fevers. If your symptoms should worsen please return to the urgent care or ED as needed.

## 2022-12-31 ENCOUNTER — EXTERNAL CHRONIC CARE MANAGEMENT (OUTPATIENT)
Dept: PRIMARY CARE CLINIC | Facility: CLINIC | Age: 57
End: 2022-12-31
Payer: MEDICARE

## 2022-12-31 PROCEDURE — 99490 PR CHRONIC CARE MGMT, 1ST 20 MIN: ICD-10-PCS | Mod: S$PBB,,, | Performed by: INTERNAL MEDICINE

## 2022-12-31 PROCEDURE — 99490 CHRNC CARE MGMT STAFF 1ST 20: CPT | Mod: S$PBB,,, | Performed by: INTERNAL MEDICINE

## 2022-12-31 PROCEDURE — 99490 CHRNC CARE MGMT STAFF 1ST 20: CPT | Mod: PBBFAC | Performed by: INTERNAL MEDICINE

## 2023-01-07 ENCOUNTER — CLINICAL SUPPORT (OUTPATIENT)
Dept: CARDIOLOGY | Facility: HOSPITAL | Age: 58
End: 2023-01-07
Payer: MEDICARE

## 2023-01-07 DIAGNOSIS — Z95.810 PRESENCE OF AUTOMATIC (IMPLANTABLE) CARDIAC DEFIBRILLATOR: ICD-10-CM

## 2023-01-18 ENCOUNTER — PATIENT MESSAGE (OUTPATIENT)
Dept: ADMINISTRATIVE | Facility: OTHER | Age: 58
End: 2023-01-18
Payer: MEDICARE

## 2023-01-18 ENCOUNTER — PATIENT MESSAGE (OUTPATIENT)
Dept: ELECTROPHYSIOLOGY | Facility: CLINIC | Age: 58
End: 2023-01-18
Payer: MEDICARE

## 2023-01-18 ENCOUNTER — PATIENT MESSAGE (OUTPATIENT)
Dept: INTERNAL MEDICINE | Facility: CLINIC | Age: 58
End: 2023-01-18
Payer: MEDICARE

## 2023-01-19 ENCOUNTER — PATIENT MESSAGE (OUTPATIENT)
Dept: INTERNAL MEDICINE | Facility: CLINIC | Age: 58
End: 2023-01-19
Payer: MEDICARE

## 2023-01-19 DIAGNOSIS — U07.1 COVID: Primary | ICD-10-CM

## 2023-01-19 RX ORDER — CODEINE PHOSPHATE AND GUAIFENESIN 10; 100 MG/5ML; MG/5ML
5 SOLUTION ORAL EVERY 8 HOURS PRN
Qty: 118 ML | Refills: 0 | Status: SHIPPED | OUTPATIENT
Start: 2023-01-19 | End: 2023-01-29

## 2023-01-19 NOTE — TELEPHONE ENCOUNTER
Spoke to pt.  Due to potential drug interactions with Paxlovid, will try Molnupiravir instead.  Will also send in cough syrup.  Advised to go to ED if worsening sx or if SOB.

## 2023-01-21 ENCOUNTER — NURSE TRIAGE (OUTPATIENT)
Dept: ADMINISTRATIVE | Facility: CLINIC | Age: 58
End: 2023-01-21
Payer: MEDICARE

## 2023-01-21 NOTE — TELEPHONE ENCOUNTER
Pt called for [facility] response and she had some questions about cough and she wasn't able to get the medication her pharmacist was out of the cough medication and she was told that since HBP to do the coriacine  for HBP. Pt asking about drug interaction for plavix and told that im unaware and to reach out to pharmacist that they would know. Pt to call back as needed. Pt denies any triage for cough she just had questions about the medication and will reach out if any other questions or concerns or worsening        Reason for Disposition   Health Information question, no triage required and triager able to answer question    Protocols used: Information Only Call - No Triage-A-

## 2023-01-30 NOTE — HPI
56-year-old female with PMHx of AFIB, heart block, pacemaker, sudden cardiac arrest, prior stroke, HTN, LAE, seizures, migraines, and others, presents to ED due to feeling unwell since this morning. Around 9:00 AM she reports onset of generalized weakness, dizziness, and lightheadedness.  Around 11:30 AM she started having headache, slowly worsening over an hour and a half, her symptoms continue to she called EMS to come to the emergency department. She has a history of headaches, but states that this headache is different than her usual/baseline headaches (no photophobia, not sensitive to sound). Denies taking medication for HA symptoms. Upon arrival to ED, she noticed new onset right sided weakness. She notes that her previous major stroke left her with residual left sided symptoms. Stroke team was consulted. CTH ordered by ED team. CTH without intracranial abnormalities and without significant detrimental changes from prior. Patient reports left periorbital HA, RUE/LE numbness, and right UE weakness. Denies aphasia or visual deficits.    Dr Velasco Knock in to talk to patient and patients mother

## 2023-01-31 ENCOUNTER — EXTERNAL CHRONIC CARE MANAGEMENT (OUTPATIENT)
Dept: PRIMARY CARE CLINIC | Facility: CLINIC | Age: 58
End: 2023-01-31
Payer: MEDICARE

## 2023-01-31 PROCEDURE — 99490 PR CHRONIC CARE MGMT, 1ST 20 MIN: ICD-10-PCS | Mod: S$PBB,,, | Performed by: INTERNAL MEDICINE

## 2023-01-31 PROCEDURE — 99490 CHRNC CARE MGMT STAFF 1ST 20: CPT | Mod: S$PBB,,, | Performed by: INTERNAL MEDICINE

## 2023-01-31 PROCEDURE — 99490 CHRNC CARE MGMT STAFF 1ST 20: CPT | Mod: PBBFAC | Performed by: INTERNAL MEDICINE

## 2023-02-02 ENCOUNTER — OFFICE VISIT (OUTPATIENT)
Dept: PHYSICAL MEDICINE AND REHAB | Facility: CLINIC | Age: 58
End: 2023-02-02
Payer: MEDICARE

## 2023-02-02 VITALS — WEIGHT: 293 LBS | BODY MASS INDEX: 50.02 KG/M2 | HEIGHT: 64 IN

## 2023-02-02 DIAGNOSIS — G43.711 CHRONIC MIGRAINE WITHOUT AURA, WITH INTRACTABLE MIGRAINE, SO STATED, WITH STATUS MIGRAINOSUS: Primary | ICD-10-CM

## 2023-02-02 DIAGNOSIS — M47.816 LUMBAR FACET ARTHROPATHY: ICD-10-CM

## 2023-02-02 DIAGNOSIS — I69.354 HEMIPARESIS AFFECTING LEFT SIDE AS LATE EFFECT OF STROKE: ICD-10-CM

## 2023-02-02 DIAGNOSIS — R26.81 GAIT INSTABILITY: Primary | ICD-10-CM

## 2023-02-02 DIAGNOSIS — E66.01 MORBID OBESITY WITH BMI OF 50.0-59.9, ADULT: ICD-10-CM

## 2023-02-02 DIAGNOSIS — M54.42 CHRONIC MIDLINE LOW BACK PAIN WITH BILATERAL SCIATICA: ICD-10-CM

## 2023-02-02 DIAGNOSIS — M54.41 CHRONIC MIDLINE LOW BACK PAIN WITH BILATERAL SCIATICA: ICD-10-CM

## 2023-02-02 DIAGNOSIS — G89.29 CHRONIC MIDLINE LOW BACK PAIN WITH BILATERAL SCIATICA: ICD-10-CM

## 2023-02-02 PROCEDURE — 99215 OFFICE O/P EST HI 40 MIN: CPT | Mod: PBBFAC | Performed by: PHYSICAL MEDICINE & REHABILITATION

## 2023-02-02 PROCEDURE — 99999 PR PBB SHADOW E&M-EST. PATIENT-LVL V: CPT | Mod: PBBFAC,,, | Performed by: PHYSICAL MEDICINE & REHABILITATION

## 2023-02-02 PROCEDURE — 99214 OFFICE O/P EST MOD 30 MIN: CPT | Mod: S$PBB,,, | Performed by: PHYSICAL MEDICINE & REHABILITATION

## 2023-02-02 PROCEDURE — 99999 PR PBB SHADOW E&M-EST. PATIENT-LVL V: ICD-10-PCS | Mod: PBBFAC,,, | Performed by: PHYSICAL MEDICINE & REHABILITATION

## 2023-02-02 PROCEDURE — 99214 PR OFFICE/OUTPT VISIT, EST, LEVL IV, 30-39 MIN: ICD-10-PCS | Mod: S$PBB,,, | Performed by: PHYSICAL MEDICINE & REHABILITATION

## 2023-02-02 RX ORDER — GABAPENTIN 300 MG/1
300 CAPSULE ORAL 3 TIMES DAILY
Qty: 270 CAPSULE | Refills: 1 | Status: SHIPPED | OUTPATIENT
Start: 2023-02-02

## 2023-02-02 RX ORDER — ACETAMINOPHEN 500 MG
500-1000 TABLET ORAL 3 TIMES DAILY PRN
Refills: 0 | COMMUNITY
Start: 2023-02-02 | End: 2023-10-18

## 2023-02-02 NOTE — PROGRESS NOTES
Patient: Isis Adames Date: 2022  : 1982 Attending: Gurmeet Carr MD  40 year old female     PCP: Martha Camilo MD     [facility] CARDIOLOGY DAILY PROGRESS NOTE      Assessment    Atrial septal defect- S/p closure of atrial septal defect with autologous pericardial patch 2022  Post op acute blood loss anemia- follow hg   Post op hypervolemia  x1 junctional - likely vagale response dry heave   Posterior mediastinal complex cystic lesion, possible bronchogenic cyst     Plan    Routine post op care  Lines and drains per cvs  IV lasix 20 mg bid per cvs- follow I&o daily weight cr  Hemodynamically stable off pressor support  Continue BB , asa       Aye Sanchez, CNP  [facility]     Patient seen and examined.   Neck veins flat, mild facial edema, trace LE edema   no chest pain, + shoulder pain    Agree with above findings    - IV lasix with albumin today   - PT/OT for rehab   - no pressor / inotrope support  - doing well with good recovery   - cv surgery to comment on posterior mediastinum lesion.      Mary Carmen Torres MD, [facility]  Advanced Heart Failure and Pulmonary Hypertension  Heart Failure Medical Director [facility]            Primary cardiologist: bear isabel     Subjective:     Review of Systems   No complaints     Vitals:    22 0530 22 0600 22 0630 22 0700   BP: 102/66 93/58 98/57    BP Location:       Patient Position:       Pulse: 69 64 64 71   Resp:  18  20   Temp:       TempSrc:       SpO2: 98% 96% 98% 96%   Weight:       Height:       LMP: 2022         Wt Readings from Last 6 Encounters:   22 59.1 kg (130 lb 4.7 oz)   22 57.2 kg (126 lb)   06/10/22 56.2 kg (124 lb)   22 55.3 kg (121 lb 14.4 oz)   22 54 kg (119 lb 0.8 oz)   22 55.2 kg (121 lb 11.2 oz)       Intake/Output:    I/O last 3 completed shifts:  In: 7718.7 [I.V.:6401; Blood:450; IV Piggyback:867.7]  Out: 3125 [Urine:2395; Blood:400; Chest  Subjective:       Patient ID: Amna Chawla is a 57 y.o. female.    Chief Complaint: new patient (Pt has no pain , but falls most of the time )      HPI      Mrs. Chawla is a 57-year-old black female with past medical history of hypertension, prediabetes, nonischemic cardiomyopathy, cardiac arrhythmias status post AICD, DVT on Xarelto, multiple TIAs, CVA with left hemiparesis in 2013, migraine headaches (followed up by Neurology and receiving Botox injections), morbid obesity (weight of 315 lb and BMI of 54.1).  She was referred to the [facility] for help with her gait.  She also has history of chronic low back pain.      The patient reports history of stroke in 2013 with residual left hemiparesis.  She had gradual improvement to near normal strength.  She has been doing fine but started stumbling about 2 months ago.  She denies any preceding trauma.  She denies any stroke-like symptoms.  She ambulates without assistive devices.  She recalls occasionally dragging her feet.  She reports history of falling only once about couple months ago.  She reports mostly having fatigue of her legs but denies any significant weakness.  She denies any numbness.  She denies any claudications.  She denies any associated back pain or knee pain related to her gait.  She has been using a Rollator walker.    She has been complaining of low back pain since 2013.  She was evaluated by Dr. Bonilla from [facility] on 02/22/2022.  X-rays of the lumbar spine were ordered and showed maintained disc spaces, and L4-5 and L5-S1 facet arthropathy, with grade 1 anterolisthesis of L4 on L5.  She has not had any spine injections or procedures.  Her pain has been waxing and waning.  Currently it is an intermittent aching and throbbing sensation in the lumbar spine.  She has infrequent shooting pain to both feet with numbness.  Her pain is aggravated by prolonged walking or bending.  It is better with rest.   Her maximum pain is 10/10 and minimum 1-2/10.  Today it is 1-2/10.  The patient denies any lower extremity weakness but complains of leg fatigue.  She denies any leg numbness.  She denies any bowel or bladder incontinence.  She denies any leg claudications.      She is currently taking:  Acetaminophen 325 mg, 1 or 2 tablets couple times per day   Diclofenac gel p.r.n.   Gabapentin 300 mg p.o. t.i.d.    Past Medical History:   Diagnosis Date    Anticoagulant long-term use     Anxiety     Asthma     Atrial fibrillation     Brain anoxic injury     Cervicalgia 8/28/2014    CHI (closed head injury) 2/19/2013    Convulsion 5/30/2015    Decreased ROM of left shoulder 4/12/2017    Defibrillator activation 2013    Depression     Heart block     History of sudden cardiac arrest 2/2013    PEA arrest with subsequent long-QT    Hx of psychiatric care     Hypertension     Left atrial enlargement 4/11/2018    Pacemaker 2013    Paresthesia 11/1/2013    Prolonged Q-T interval on ECG 2/8/2013    Psychiatric problem     Seizures     Sleep difficulties     Stroke     weakness lt side    Therapy     Thyroid disease     Upper airway resistance syndrome 2/21/2017        Review of patient's allergies indicates:   Allergen Reactions    Aspirin Hives    Imitrex [sumatriptan] Palpitations    Penicillins Hives and Swelling    Shellfish containing products Anaphylaxis     seafood    Reglan [metoclopramide hcl] Other (See Comments)     Parkinsonism         Review of Systems   Constitutional:  Positive for fatigue. Negative for chills and fever.   Eyes:  Negative for visual disturbance.   Respiratory:  Negative for shortness of breath.    Cardiovascular:  Negative for chest pain.   Gastrointestinal:  Negative for blood in stool, nausea and vomiting.   Genitourinary:  Positive for frequency. Negative for difficulty urinating.   Musculoskeletal:  Positive for back pain and gait problem. Negative for arthralgias and  Tube:330]      Intake/Output Summary (Last 24 hours) at 7/20/2022 0723  Last data filed at 7/20/2022 0700  Gross per 24 hour   Intake 7811.31 ml   Output 3175 ml   Net 4636.31 ml       Telemetry:   Telemetry personally reviewed    Physical Exam  Vitals reviewed.   Cardiovascular:      Rate and Rhythm: Normal rate and regular rhythm.   Pulmonary:      Effort: Pulmonary effort is normal.      Breath sounds: Examination of the right-lower field reveals decreased breath sounds. Examination of the left-lower field reveals decreased breath sounds. Decreased breath sounds present.   Musculoskeletal:      Right lower leg: No edema.      Left lower leg: No edema.   Skin:     General: Skin is warm and dry.   Neurological:      Mental Status: She is alert.            Laboratory Results:  Recent Labs   Lab 07/20/22  0404 07/20/22  0100 07/19/22  1548 07/19/22  1120 07/19/22  1045 07/16/22  1013 07/16/22  1013   SODIUM 145  --   --  147*  --   --  142   POTASSIUM 4.0 4.1 4.5 3.7  --   --  3.8   CO2 24  --   --  25  --   --  26   ANIONGAP 10  --   --  9  --   --  7   GLUCOSE 115*  --   --  92  --   --  84   BUN 9  --   --  9  --   --  15   CREATININE 0.44*  --   --  0.38*  --   --  0.48*   BCRAT 20  --   --  24  --   --  31*   AST  --   --   --   --   --   --  14   GPT  --   --   --   --   --   --  20   ALKPT  --   --   --   --   --   --  46   MG 2.2 2.3 2.0 2.4  --    < >  --    WBC 15.5*  --   --  15.4*  --   --  6.2   HGB 9.1*  --   --  11.0*  --   --  13.3   HCT 27.8*  --   --  32.9*  --   --  39.5   *  --   --  125* 106*  --  243    < > = values in this interval not displayed.          Radiology Results:  No orders to display       Relevant Results:  Encounter Date: 07/19/22   Electrocardiogram 12-Lead   Result Value    Ventricular Rate EKG/Min (BPM) 75    Atrial Rate (BPM) 75    ID-Interval (MSEC) 140    QRS-Interval (MSEC) 90    QT-Interval (MSEC) 412    QTc 460    P Axis (Degrees) 60    R Axis (Degrees) 97    T  Axis (Degrees) 69    REPORT TEXT      Sinus rhythm  with  premature atrial complexes  in a pattern of bigeminy  Rightward axis  Nonspecific T wave abnormality  Abnormal ECG  When compared with ECG of  09-JUN-2022 10:25,  premature atrial complexes  are now  present  Confirmed by COOPER GARCIA MD ([id number]) on 7/19/2022 4:25:24 PM       No problem-specific Assessment & Plan notes found for this encounter.    1. ASD (atrial septal defect), ostium secundum      No valid procedures specified.    Impression:  Patient Active Problem List   Diagnosis   • Chronic hepatitis B (CMS/HCC)   • Gastroesophageal reflux disease   • Heart murmur, systolic   • Migraine without aura and without status migrainosus, not intractable   • Pulmonary hypertension due to left ventricular diastolic dysfunction (CMS/HCC)   • Atypical squamous cells cannot exclude high grade squamous intraepithelial lesion on cytologic smear of cervix (ASC-H)   • Chronic abdominal pain   • Fatigue   • ASD (atrial septal defect), ostium secundum   • Right ventricular enlargement   • IUD strings lost   • ASD (atrial septal defect)   • Thrombocytopenia (CMS/HCC)   • Acute blood loss as cause of postoperative anemia   • Bandemia     Assessment & Plan     Code Status:    Code Status: Full Resuscitation    Medications/Infusions:    Current Facility-Administered Medications   Medication Dose Route Frequency Provider Last Rate Last Admin   • gabapentin (NEURONTIN) capsule 600 mg  600 mg Oral Daily Kianna Cary PA-C       • aspirin (ECOTRIN) EC tablet 325 mg  325 mg Oral Daily Kianna Cary PA-C       • mupirocin (BACTROBAN) 2 % ointment 1 application  1 application Each Nare 2 times per day Kianna Cary PA-C   1 application at 07/19/22 2004   • chlorhexidine gluconate (PERIDEX) 0.12 % solution 15 mL  15 mL Swish & Spit 2 times per day Kianna Cary PA-C       • ceFAZolin (ANCEF) syringe 2,000 mg  2,000 mg Intravenous 3 times per day Kianna Cary PA-C   2,000 mg  neck pain.   Neurological:  Positive for headaches. Negative for dizziness and weakness.   Psychiatric/Behavioral:  Positive for sleep disturbance. Negative for behavioral problems.            Objective:      Physical Exam  Vitals reviewed.   Constitutional:       Appearance: She is well-developed.   HENT:      Head: Normocephalic and atraumatic.   Eyes:      Extraocular Movements: Extraocular movements intact.   Musculoskeletal:      Cervical back: Normal range of motion. No tenderness.      Comments: BUE:  ROM:   RUE: full   LUE: full.  Strength:    RUE: 5/5 at shoulder abduction, 5 elbow flexion, 5 elbow extension, 5 hand .   LUE: 5-/5 at shoulder abduction, 5- elbow flexion, 5- elbow extension, 5- hand .  Sensation to pinprick:   RUE: intact.   LUE: intact.  DTR:    RUE: +1 biceps, +1 triceps.   LUE:  +1 biceps, +1 triceps.      BLE:  ROM:   RLE: full.   LLE: full.  Knee crepitus:   RLE: -ve.   LLE: -ve.   Strength:    RLE: 5/5 at hip flexion, 5 knee extension, 5 ankle DF, 5 PF, 5 EHL.   LLE: 4/5 at hip flexion, 4 knee extension, 4 ankle DF, 4 PF, 4 EHL.  Sensation to pinprick:     RLE: intact.      LLE: intact.   DTR:     RLE: +1 knee, +1 ankle.    LLE: +1 knee, +1 ankle.  Clonus:    Rt ankle: -ve.    Lt ankle: -ve.  SLR (sitting):      RLE: -ve.      LLE: -ve.    +ve moderate tenderness over lumbar spine.    Gait:  Slow jessie, some shuffling of the left side.     Skin:     General: Skin is warm.   Neurological:      General: No focal deficit present.      Mental Status: She is alert.   Psychiatric:         Behavior: Behavior normal.       Assessment:       1. Gait instability    2. Hemiparesis affecting left side as late effect of stroke    3. Chronic midline low back pain with bilateral sciatica    4. Lumbar facet arthropathy    5. Morbid obesity with BMI of 50.0-59.9, adult          Plan:         - NSAIDs will be avoided due to anticoagulation therapy with Xarelto.    - Continue gabapentin 300 mg  at 07/20/22 0557   • docusate sodium-sennosides (SENOKOT S) 50-8.6 MG 2 tablet  2 tablet Oral Daily Kianna Cary PA-C       • [START ON 7/21/2022] bisacodyl (DULCOLAX) suppository 10 mg  10 mg Rectal Once Kianna Cary PA-C       • [START ON 7/23/2022] polyethylene glycol (MIRALAX) packet 17 g  17 g Oral BID Kianna Cary PA-C       • famotidine (PEPCID) tablet 20 mg  20 mg Oral 2 times per day Kianna Cary PA-C       • metoPROLOL tartrate (LOPRESSOR) tablet 25 mg  25 mg Per NG tube 2 times per day Kianna Cary PA-C       • furosemide (LASIX INJECT) injection 20 mg  20 mg Intravenous BID Kianna Cary PA-C         Current Facility-Administered Medications   Medication Dose Route Frequency Provider Last Rate Last Admin   • insulin regular (human) (HumuLIN R) 100 units in sodium chloride 0.9% 100 mL infusion  0-117 Units/hr Intravenous Continuous Martín Arango CNP 2.5 mL/hr at 07/20/22 0700 2.5 Units/hr at 07/20/22 0700   • ketamine (KETALAR) 500 mg/250 mL in sodium chloride 0.9% infusion  0-1.5 mg/kg/hr (Dosing Weight) Intravenous Continuous Jesika Lockwood CRNA   Completed at 07/19/22 1550   • nitroGLYcerin 50 mg in dextrose 5% 250 mL infusion  0-150 mcg/min Intravenous Continuous Kianna Cary PA-C       • DOBUTamine (DOBUTREX) 100 mg/50 mL in dextrose 5 % infusion  0-20 mcg/kg/min (Dosing Weight) Intravenous Continuous Kianna Cary PA-C   Completed at 07/19/22 1900   • NORepinephrine (LEVOPHED) 8 mg/250 mL in sodium chloride 0.9 % infusion  0-4 mcg/min Intravenous Continuous Kianna Cary PA-C   Completed at 07/20/22 0352   • dexMEDEtomidine (PRECEDEX) 400 mcg/100 mL in sodium chloride 0.9 % infusion  0-0.7 mcg/kg/hr (Dosing Weight) Intravenous Continuous Kianna Cary PA-C 5.6 mL/hr at 07/19/22 2100 0.4 mcg/kg/hr at 07/19/22 2100   • dextrose 5 % / sodium chloride 0.45% with KCl 40 mEq infusion   Intravenous Continuous PRN Kianna Cary PA-C 70 mL/hr at 07/20/22 0700 Rate Verify at  p.o. 3 times per day.  Her radicular symptoms are mild and we will hold off on going up on this medicine for now.  - Continue Tylenol 500 mg tablets, 1-2 tablets p.o. t.i.d. p.r.n..  - Weight loss was encouraged.  - Referral to physical therapy to work on her back and gait, trial of left AFO.  - Follow up in about 4 months (around 6/2/2023).      This was a 45 minute visit, 50% of which was spent educating the patient about the diagnosis and the treatment plan.    This note was partly generated with [employer] voice recognition software. I apologize for any possible typographical errors.           07/20/22 0700   • dextrose 5 % / sodium chloride 0.45% infusion   Intravenous Continuous PRN Kianna D Raeann, PA-C       • dextrose 5 % / sodium chloride 0.45% infusion   Intravenous Continuous PRN Kianna D Raeann, PA-C       • sodium chloride 0.9% infusion   Intravenous Continuous PRN Kianna D Raeann, PA-C       • sodium chloride 0.9% infusion   Intravenous Continuous PRN Kianna D Raeann, PA-C 20 mL/hr at 07/20/22 0700 Rate Verify at 07/20/22 0700   • sodium chloride 0.9% infusion   Intravenous Continuous PRN Kianna D Raeann, PA-C       • AMIODarone (CORDARONE) 450 mg/250 mL dextrose 5% infusion  0.5 mg/min Intravenous Continuous Kianna D Raeann, PA-C   Completed at 07/19/22 1644       Medications Prior to Admission   Medication Sig Dispense Refill   • NON FORMULARY Supplements from [place] stopped 5/2022, pt unsure of purpose of supplements     • NON FORMULARY Had IUD inserted in [place], in 2010, type unknown

## 2023-02-04 ENCOUNTER — PATIENT MESSAGE (OUTPATIENT)
Dept: ADMINISTRATIVE | Facility: OTHER | Age: 58
End: 2023-02-04
Payer: MEDICARE

## 2023-02-13 ENCOUNTER — PATIENT MESSAGE (OUTPATIENT)
Dept: INTERNAL MEDICINE | Facility: CLINIC | Age: 58
End: 2023-02-13
Payer: MEDICARE

## 2023-02-15 ENCOUNTER — OFFICE VISIT (OUTPATIENT)
Dept: INTERNAL MEDICINE | Facility: CLINIC | Age: 58
End: 2023-02-15
Payer: MEDICARE

## 2023-02-15 VITALS
HEIGHT: 64 IN | HEART RATE: 67 BPM | BODY MASS INDEX: 50.02 KG/M2 | SYSTOLIC BLOOD PRESSURE: 138 MMHG | WEIGHT: 293 LBS | OXYGEN SATURATION: 95 % | DIASTOLIC BLOOD PRESSURE: 72 MMHG

## 2023-02-15 DIAGNOSIS — J42 PROTRACTED BACTERIAL BRONCHITIS: Primary | ICD-10-CM

## 2023-02-15 DIAGNOSIS — B96.89 PROTRACTED BACTERIAL BRONCHITIS: Primary | ICD-10-CM

## 2023-02-15 PROCEDURE — 99215 OFFICE O/P EST HI 40 MIN: CPT | Mod: PBBFAC | Performed by: INTERNAL MEDICINE

## 2023-02-15 PROCEDURE — 99999 PR PBB SHADOW E&M-EST. PATIENT-LVL V: ICD-10-PCS | Mod: PBBFAC,,, | Performed by: INTERNAL MEDICINE

## 2023-02-15 PROCEDURE — 99213 PR OFFICE/OUTPT VISIT, EST, LEVL III, 20-29 MIN: ICD-10-PCS | Mod: S$PBB,,, | Performed by: INTERNAL MEDICINE

## 2023-02-15 PROCEDURE — 99213 OFFICE O/P EST LOW 20 MIN: CPT | Mod: S$PBB,,, | Performed by: INTERNAL MEDICINE

## 2023-02-15 PROCEDURE — 99999 PR PBB SHADOW E&M-EST. PATIENT-LVL V: CPT | Mod: PBBFAC,,, | Performed by: INTERNAL MEDICINE

## 2023-02-15 RX ORDER — DOXYCYCLINE 100 MG/1
100 CAPSULE ORAL 2 TIMES DAILY
Qty: 20 CAPSULE | Refills: 0 | Status: SHIPPED | OUTPATIENT
Start: 2023-02-15 | End: 2023-02-25

## 2023-02-15 RX ORDER — PROMETHAZINE HYDROCHLORIDE AND CODEINE PHOSPHATE 6.25; 1 MG/5ML; MG/5ML
5 SOLUTION ORAL EVERY 4 HOURS PRN
Qty: 120 ML | Refills: 0 | Status: SHIPPED | OUTPATIENT
Start: 2023-02-15 | End: 2023-02-24 | Stop reason: SDUPTHER

## 2023-02-15 NOTE — PROGRESS NOTES
Chief Complaint  Chief Complaint   Patient presents with    Cough    Hoarse       HPI  Amna Chawla is a 57 y.o. female with multiple co morbidities including asthma, hypertension, nonischemic cardiomyopathy, history of DVT on Xarelto, CVA with left hemiparesis in 2013, who presents for evaluation of persistent cough.     Patient tested positive for Covid-19 via a home test on 1/16/23. She has been vaccinated, but has not received the most recent booster. At onset of symptoms she experienced anosmia, fever, chills, cough, and congestion. She contacted her PCP Dr. Mina, who prescribed her Molnupirivir and codeine phosphate/guaifenesin, which she took with relief. Her fever resolved and her taste returned, however she continues to have a persistent cough and runny nose. Her cough is intermittently productive with green sputum. She denies shortness of breath or wheezing. Patient has a history of asthma, but has not needed to use her rescue inhaler. She denies dyspnea on exertion. Her nose is runny with clear mucus. She is not currently taking any medication for these symptoms. She ran out of the cough medication she was prescribed. She denies any chest pain, palpitations, or leg swelling. She has not had any further fevers.       PAST MEDICAL HISTORY:  Past Medical History:   Diagnosis Date    Anticoagulant long-term use     Anxiety     Asthma     Atrial fibrillation     Brain anoxic injury     Cervicalgia 8/28/2014    CHI (closed head injury) 2/19/2013    Convulsion 5/30/2015    Decreased ROM of left shoulder 4/12/2017    Defibrillator activation 2013    Depression     Heart block     History of sudden cardiac arrest 2/2013    PEA arrest with subsequent long-QT    Hx of psychiatric care     Hypertension     Left atrial enlargement 4/11/2018    Pacemaker 2013    Paresthesia 11/1/2013    Prolonged Q-T interval on ECG 2/8/2013    Psychiatric problem     Seizures     Sleep difficulties     Stroke     weakness lt side     Therapy     Thyroid disease     Upper airway resistance syndrome 2/21/2017       PAST SURGICAL HISTORY:  Past Surgical History:   Procedure Laterality Date    breast reduction      CARDIAC DEFIBRILLATOR PLACEMENT      CARDIAC DEFIBRILLATOR PLACEMENT      CARPAL TUNNEL RELEASE Right     COLONOSCOPY N/A 5/7/2019    Procedure: COLONOSCOPY;  Surgeon: Juan Coffey MD;  Location: [facility] (2ND FLR);  Service: Endoscopy;  Laterality: N/A;  per DR. Bustamante Pt to have balloon with DR. Coffey due to excesive looping, could not reach cecum - see telephone encounter 3/8/19 and last procedure report dated 6/24/14/ Per Piedad schedule as 90 min case- ERW  pacemaker/AICD Boitronik/   per , ok to hold Xareltox 2 days    COLONOSCOPY N/A 6/2/2022    Procedure: COLONOSCOPY;  Surgeon: Juan Coffey MD;  Location: [facility] (2ND FLR);  Service: Endoscopy;  Laterality: N/A;  combined orders    ESOPHAGOGASTRODUODENOSCOPY N/A 6/2/2022    Procedure: EGD (ESOPHAGOGASTRODUODENOSCOPY);  Surgeon: Juan Coffey MD;  Location: [facility] (2ND FLR);  Service: Endoscopy;  Laterality: N/A;  BMI 54; pt requests 1st available [facility]  Fully vaccinated, Hold Xarelto x 2 days per Dr. Mejia and Plavix x 5 days per Dr. Grossman see telephone encounter 4/25/22, Biotronik PM/AICD, prep instr portal and mailed -ml    HERNIA REPAIR      HIATAL HERNIA REPAIR      INSERT / REPLACE / REMOVE PACEMAKER  10/2017    CRT-D upgrade    REVISION OF IMPLANTABLE CARDIOVERTER-DEFIBRILLATOR (ICD) ELECTRODE LEAD PLACEMENT Left 12/7/2020    Procedure: REVISION, INSERTION, ELECTRODE LEAD, ICD;  Surgeon: Avelino Mejia MD;  Location: [facility] EP LAB;  Service: Cardiology;  Laterality: Left;    REVISION OF SKIN POCKET FOR CARDIOVERTER-DEFIBRILLATOR  12/7/2020    Procedure: Revision, Skin Pocket, For Cardioverter-Defibrillator;  Surgeon: Avelino Mejia MD;  Location: [facility] EP LAB;  Service: Cardiology;;    TOTAL REDUCTION MAMMOPLASTY      TRANSESOPHAGEAL ECHOCARDIOGRAPHY N/A  12/7/2020    Procedure: ECHOCARDIOGRAM, TRANSESOPHAGEAL;  Surgeon: Ivory Goodman MD;  Location: [facility] EP LAB;  Service: Cardiology;  Laterality: N/A;    TRANSESOPHAGEAL ECHOCARDIOGRAPHY N/A 12/7/2020    Procedure: ECHOCARDIOGRAM, TRANSESOPHAGEAL;  Surgeon: Patrick Diagnostic Provider;  Location: [facility] OR 2ND FLR;  Service: Cardiology;  Laterality: N/A;    TRIGGER FINGER RELEASE Left 8/2/2019    Procedure: RELEASE, TRIGGER FINGER left middle;  Surgeon: Matt Pugh Jr., MD;  Location: [facility] OR;  Service: Plastics;  Laterality: Left;    TRIGGER FINGER RELEASE Right 2/11/2020    Procedure: RELEASE, TRIGGER FINGER middle;  Surgeon: Matt Pugh Jr., MD;  Location: [facility] OR;  Service: Plastics;  Laterality: Right;    TUBAL LIGATION         SOCIAL HISTORY:  Social History     Socioeconomic History    Marital status: Single   Occupational History     Employer: [email or web address]   Tobacco Use    Smoking status: Never     Passive exposure: Never    Smokeless tobacco: Never   Substance and Sexual Activity    Alcohol use: Yes     Comment: wine, socially    Drug use: No    Sexual activity: Not Currently   Social History Narrative    Now on disability     Social Determinants of Health     Financial Resource Strain: Low Risk     Difficulty of Paying Living Expenses: Not hard at all   Food Insecurity: Unknown    Worried About Running Out of Food in the Last Year: Patient refused    Ran Out of Food in the Last Year: Never true   Transportation Needs: No Transportation Needs    Lack of Transportation (Medical): No    Lack of Transportation (Non-Medical): No   Physical Activity: Insufficiently Active    Days of Exercise per Week: 3 days    Minutes of Exercise per Session: 20 min   Stress: No Stress Concern Present    Feeling of Stress : Only a little   Social Connections: Moderately Isolated    Frequency of Communication with Friends and Family: More than three times a week    Frequency of Social Gatherings with  Friends and Family: More than three times a week    Attends [employer] Services: Never    Active Member of Clubs or Organizations: Yes    Attends Club or Organization Meetings: 1 to 4 times per year    Marital Status: Never    Housing Stability: High Risk    Unable to Pay for Housing in the Last Year: Yes    Number of Places Lived in the Last Year: 1    Unstable Housing in the Last Year: No       FAMILY HISTORY:  Family History   Problem Relation Age of Onset    Hypertension Mother     Glaucoma Maternal Grandmother     Glaucoma Paternal Grandmother     COPD Other     Heart failure Other        ALLERGIES AND MEDICATIONS: updated and reviewed.  Review of patient's allergies indicates:   Allergen Reactions    Aspirin Hives    Imitrex [sumatriptan] Palpitations    Penicillins Hives and Swelling    Shellfish containing products Anaphylaxis     seafood    Reglan [metoclopramide hcl] Other (See Comments)     Parkinsonism      Current Outpatient Medications   Medication Sig Dispense Refill    acetaminophen (TYLENOL) 500 MG tablet Take 1-2 tablets (500-1,000 mg total) by mouth 3 (three) times daily as needed for Pain.  0    amLODIPine (NORVASC) 5 MG tablet Take 1 tablet (5 mg total) by mouth once daily. 90 each 3    ARIPiprazole (ABILIFY) 15 MG Tab Take 1 tablet (15 mg total) by mouth once daily. 90 tablet 1    blood sugar diagnostic Strp 1 strip by Misc.(Non-Drug; Combo Route) route 2 (two) times daily. 200 strip 3    blood-glucose meter kit Please provide with insurance covered meter 1 each 0    carvediloL (COREG) 25 MG tablet TAKE 1 TABLET(25 MG) BY MOUTH TWICE DAILY WITH MEALS 60 tablet 11    clonazePAM (KLONOPIN) 1 MG tablet TAKE 1 TABLET BY MOUTH DAILY AS NEEDED FOR ANXIETY 30 tablet 2    clopidogreL (PLAVIX) 75 mg tablet Take 1 tablet (75 mg total) by mouth once daily. 90 tablet 3    cyanocobalamin, vitamin B-12, 1,000 mcg Subl Place 1,000 mcg under the tongue once daily. 90 tablet 3    diclofenac sodium  (VOLTAREN) 1 % Gel Apply 2 g topically 4 (four) times daily. 100 g 1    donepezil (ARICEPT) 10 MG tablet Take 1 tablet (10 mg total) by mouth 2 (two) times daily. 180 tablet 3    etodolac (LODINE) 500 MG tablet Take 1 tablet (500 mg total) by mouth 2 (two) times daily as needed (migraine). 30 tablet 12    flurbiprofen (ANSAID) 100 MG tablet Take 1 tablet (100 mg total) by mouth 2 (two) times daily as needed (migraine). 12 tablet 12    fremanezumab-vfrm (AJOVY) 225 mg/1.5 mL injection Inject 1.5 mLs (225 mg total) into the skin every 28 days. 1 each 12    furosemide (LASIX) 20 MG tablet Take 1 tablet (20 mg total) by mouth every other day. 30 tablet 6    gabapentin (NEURONTIN) 300 MG capsule Take 1 capsule (300 mg total) by mouth 3 (three) times daily. 270 capsule 1    hydrOXYzine HCL (ATARAX) 25 MG tablet Take 1 tablet (25 mg total) by mouth 3 (three) times daily as needed for Itching. 20 tablet 0    lancets Misc 1 Device by Misc.(Non-Drug; Combo Route) route 2 (two) times daily. 200 each 3    losartan (COZAAR) 100 MG tablet Take 1 tablet (100 mg total) by mouth once daily. 90 tablet 3    magnesium 200 mg Tab Take 200 mg by mouth once daily. (Patient taking differently: Take 200 mg by mouth every other day.) 30 each 12    mupirocin (BACTROBAN) 2 % ointment Apply to affected area 3 times daily 22 g 1    omeprazole (PRILOSEC) 40 MG capsule Take 1 capsule (40 mg total) by mouth once daily. Take 30 minutes before breakfast 90 capsule 6    ondansetron (ZOFRAN-ODT) 4 MG TbDL Take 1 tablet (4 mg total) by mouth every 12 (twelve) hours as needed (nausea). 40 tablet 12    rivaroxaban (XARELTO) 20 mg Tab TAKE 1 TABLET BY MOUTH DAILY EVENING MEAL WITH DINNER 30 tablet 11    rosuvastatin (CRESTOR) 40 MG Tab Take 1 tablet (40 mg total) by mouth every evening. 90 tablet 3    sertraline (ZOLOFT) 100 MG tablet Take 1.5 tablets (150 mg total) by mouth once daily. 135 tablet 3    silver sulfADIAZINE 1% (SILVADENE) 1 % cream Apply  topically 2 (two) times daily. 50 g 0    suvorexant (BELSOMRA) 10 mg Tab Take 1 tablet by mouth nightly as needed (insomnia). 15 tablet 1    tiaGABine (GABITRIL) 4 MG tablet Take 1 tablet (4 mg total) by mouth every evening. 30 tablet 12    tiZANidine (ZANAFLEX) 4 MG tablet TAKE 1 TABLET(4 MG) BY MOUTH EVERY 6 HOURS AS NEEDED 90 tablet 12    traZODone (DESYREL) 100 MG tablet Take 1 tablet (100 mg total) by mouth every evening. 30 tablet 11    triamcinolone acetonide 0.1% (KENALOG) 0.1 % ointment Apply topically 2 (two) times daily. 30 g 1    TRUE METRIX GLUCOSE METER Misc TEST BG BID  0    ubrogepant (UBROGEPANT) 100 mg tablet Take 1 tablet (100 mg total) by mouth 2 (two) times daily as needed for Migraine. 10 tablet 12    zolpidem (AMBIEN) 10 mg Tab Take 1 tablet (10 mg total) by mouth nightly as needed (insomnia). 30 tablet 5    cetirizine (ZYRTEC) 10 MG tablet Take 1 tablet (10 mg total) by mouth once daily. for 7 days 30 tablet 0    doxycycline (MONODOX) 100 MG capsule Take 1 capsule (100 mg total) by mouth 2 (two) times daily. for 10 days 20 capsule 0    EPINEPHrine (EPIPEN) 0.3 mg/0.3 mL AtIn Inject 0.3 mLs (0.3 mg total) into the muscle once. for 1 dose 0.3 mL 0    famotidine (PEPCID) 20 MG tablet Take 1 tablet (20 mg total) by mouth 2 (two) times daily. for 7 days 14 tablet 0    promethazine-codeine 6.25-10 mg/5 ml (PHENERGAN WITH CODEINE) 6.25-10 mg/5 mL syrup Take 5 mLs by mouth every 4 (four) hours as needed for Cough. 120 mL 0     Current Facility-Administered Medications   Medication Dose Route Frequency Provider Last Rate Last Admin    ketorolac injection 60 mg  60 mg Intramuscular 1 time in Clinic/HOD David Cortez MD        ketorolac injection 60 mg  60 mg Intramuscular 1 time in Clinic/HOD David Cortez MD        onabotulinumtoxina injection 200 Units  200 Units Intramuscular Q90 Days David Cortez MD   200 Units at 10/19/18 1713    onabotulinumtoxina injection 200 Units  200 Units Intramuscular  Q90 Days David Cortez MD   200 Units at 12/28/18 1106    onabotulinumtoxina injection 200 Units  200 Units Intramuscular Q90 Days David Cortez MD   200 Units at 03/13/19 1504    onabotulinumtoxina injection 200 Units  200 Units Intramuscular Q90 Days David Cortez MD   200 Units at 05/23/19 1123    onabotulinumtoxina injection 200 Units  200 Units Intramuscular Q90 Days David Cortez MD   200 Units at 10/11/19 1112    onabotulinumtoxina injection 200 Units  200 Units Intramuscular Q90 Days David Cortez MD   200 Units at 12/19/19 1036    onabotulinumtoxina injection 200 Units  200 Units Intramuscular Q10 weeks David Cortez MD   200 Units at 03/10/20 1036    onabotulinumtoxina injection 200 Units  200 Units Intramuscular Q90 Days David Cortez MD   200 Units at 06/26/20 1538    triamcinolone acetonide injection 40 mg  40 mg Intramuscular 1 time in Clinic/HOD INOCENCIA [place]         Facility-Administered Medications Ordered in Other Visits   Medication Dose Route Frequency Provider Last Rate Last Admin    lactated ringers infusion   Intravenous Continuous Humberto Angel MD   Stopped at 02/11/20 0801    lidocaine (PF) 10 mg/ml (1%) injection 5 mg  0.5 mL Intradermal Once Humberto Angel MD             ROS  Review of Systems   Constitutional:  Negative for chills, fatigue and fever.   HENT:  Positive for congestion and rhinorrhea. Negative for sinus pressure and sore throat.    Respiratory:  Positive for cough. Negative for shortness of breath and wheezing.    Cardiovascular:  Negative for chest pain, palpitations and leg swelling.   Gastrointestinal:  Negative for abdominal pain, nausea and vomiting.   Musculoskeletal:  Negative for arthralgias and myalgias.   Skin:  Negative for rash.   Neurological:  Negative for light-headedness and headaches.         Physical Exam  Vitals:    02/15/23 0907   BP: 138/72   BP Location: Left arm   Patient Position: Sitting   BP Method: Large (Manual)   Pulse: 67  "  SpO2: 95%   Weight: (!) 142.2 kg (313 lb 7.9 oz)   Height: 5' 4" (1.626 m)    Body mass index is 53.81 kg/m².  Weight: (!) 142.2 kg (313 lb 7.9 oz)   Height: 5' 4" (162.6 cm)   Physical Exam  Constitutional:       Appearance: Normal appearance.   HENT:      Head: Normocephalic and atraumatic.      Right Ear: Tympanic membrane normal.      Left Ear: Tympanic membrane normal.      Nose: Nose normal.      Mouth/Throat:      Mouth: Mucous membranes are moist.      Pharynx: No posterior oropharyngeal erythema.   Eyes:      Extraocular Movements: Extraocular movements intact.      Pupils: Pupils are equal, round, and reactive to light.   Cardiovascular:      Rate and Rhythm: Normal rate and regular rhythm.      Pulses: Normal pulses.      Heart sounds: Normal heart sounds. No murmur heard.    No friction rub. No gallop.   Pulmonary:      Effort: Pulmonary effort is normal. No respiratory distress.      Breath sounds: Normal breath sounds. No wheezing.   Abdominal:      General: Bowel sounds are normal.      Palpations: Abdomen is soft.      Tenderness: There is no abdominal tenderness.   Musculoskeletal:         General: Normal range of motion.      Cervical back: Normal range of motion and neck supple.   Skin:     General: Skin is warm and dry.      Capillary Refill: Capillary refill takes less than 2 seconds.   Neurological:      General: No focal deficit present.      Mental Status: She is alert and oriented to person, place, and time.   Psychiatric:         Mood and Affect: Mood normal.         Behavior: Behavior normal.         Health Maintenance         Date Due Completion Date    COVID-19 Vaccine (4 - Booster for Moderna series) 03/07/2022 1/10/2022    Mammogram 12/02/2022 12/2/2021    Lipid Panel 05/17/2023 5/17/2022    Hemoglobin A1c (Prediabetes) 10/19/2023 10/19/2022    High Dose Statin 02/02/2024 2/2/2023    Cervical Cancer Screening 09/10/2024 9/10/2019    Colorectal Cancer Screening 06/02/2027 6/2/2022    " TETANUS VACCINE 10/02/2028 10/2/2018              Assessment and Plan:    Amna Chawla is a 57 year old female with multiple co morbidities including asthma, hypertension, nonischemic cardiomyopathy, history of DVT on Xarelto, CVA with left hemiparesis in 2013, who presents for evaluation of persistent cough.     Protracted bacterial bronchitis  - Promethazine-codeine 6.25-10 mg/5 ml; take 5mLs by mouth every 4 hours as needed  - Doxycycline 100 mg BID x 10 days  - OTC guaifenesin   - Follow up with PCP if symptoms do not improve

## 2023-02-22 ENCOUNTER — PATIENT MESSAGE (OUTPATIENT)
Dept: BARIATRICS | Facility: CLINIC | Age: 58
End: 2023-02-22
Payer: MEDICARE

## 2023-02-28 ENCOUNTER — EXTERNAL CHRONIC CARE MANAGEMENT (OUTPATIENT)
Dept: PRIMARY CARE CLINIC | Facility: CLINIC | Age: 58
End: 2023-02-28
Payer: MEDICARE

## 2023-02-28 PROCEDURE — 99489 CPLX CHRNC CARE EA ADDL 30: CPT | Mod: PBBFAC,27 | Performed by: INTERNAL MEDICINE

## 2023-02-28 PROCEDURE — 99487 CPLX CHRNC CARE 1ST 60 MIN: CPT | Mod: S$PBB,,, | Performed by: INTERNAL MEDICINE

## 2023-02-28 PROCEDURE — 99487 PR COMPLX CHRON CARE MGMT, 1ST HR, PER MONTH: ICD-10-PCS | Mod: S$PBB,,, | Performed by: INTERNAL MEDICINE

## 2023-02-28 PROCEDURE — 99489 PR COMPLX CHRON CARE MGMT, EA ADDTL 30 MIN, PER MONTH: ICD-10-PCS | Mod: S$PBB,,, | Performed by: INTERNAL MEDICINE

## 2023-02-28 PROCEDURE — 99489 CPLX CHRNC CARE EA ADDL 30: CPT | Mod: S$PBB,,, | Performed by: INTERNAL MEDICINE

## 2023-02-28 PROCEDURE — 99487 CPLX CHRNC CARE 1ST 60 MIN: CPT | Mod: PBBFAC | Performed by: INTERNAL MEDICINE

## 2023-03-01 ENCOUNTER — PROCEDURE VISIT (OUTPATIENT)
Dept: NEUROLOGY | Facility: CLINIC | Age: 58
End: 2023-03-01
Payer: MEDICARE

## 2023-03-01 VITALS
WEIGHT: 293 LBS | SYSTOLIC BLOOD PRESSURE: 125 MMHG | HEART RATE: 67 BPM | HEIGHT: 64 IN | DIASTOLIC BLOOD PRESSURE: 78 MMHG | BODY MASS INDEX: 50.02 KG/M2

## 2023-03-01 DIAGNOSIS — M54.81 BILATERAL OCCIPITAL NEURALGIA: ICD-10-CM

## 2023-03-01 DIAGNOSIS — G43.711 CHRONIC MIGRAINE WITHOUT AURA, WITH INTRACTABLE MIGRAINE, SO STATED, WITH STATUS MIGRAINOSUS: Primary | ICD-10-CM

## 2023-03-01 PROCEDURE — 64615 PR CHEMODENERVATION OF MUSCLE FOR CHRONIC MIGRAINE: ICD-10-PCS | Mod: S$PBB,,, | Performed by: PSYCHIATRY & NEUROLOGY

## 2023-03-01 PROCEDURE — 64615 CHEMODENERV MUSC MIGRAINE: CPT | Mod: 25,PBBFAC | Performed by: PSYCHIATRY & NEUROLOGY

## 2023-03-01 PROCEDURE — 64615 CHEMODENERV MUSC MIGRAINE: CPT | Mod: S$PBB,,, | Performed by: PSYCHIATRY & NEUROLOGY

## 2023-03-01 RX ADMIN — ONABOTULINUMTOXINA 200 UNITS: 100 INJECTION, POWDER, LYOPHILIZED, FOR SOLUTION INTRADERMAL; INTRAMUSCULAR at 10:03

## 2023-03-01 NOTE — PROCEDURES
Follow up:  Reports worsening LBP after COVID in Jan   On antibiotics for bronchitis     Prior note:   Reports nausea   Knees buckle frequently   No benefit from new shoes   Not Using cane     Prior note:   Reports overall worse HA   Feels tremors in whole body lasting 3-4 hrs   Tried ativan - helped for a few hours     Prior note:   She is grappling with grief of loss of her sister   Pending grief counseling      Prior note:   S/p course of prednisone for GI allergic reaction (scratch throat - seafood related)     Prior note:   Migraine Hz increased during storm - almost daily   One episode of lightheadedness. No SOB, CP       Prior note:   Reports episode of lightheadedness 2 days ago      Prior note:   S/p COVID vaccine      Prior note:   Reports more physical fatigue   taking 2 naps a day   L sided severe migraine lasted 2 days. Took ubrelvy, motrin, zanaflex      Prior note:   3 days of severe L sided HA + nausea   Gradual onset   Ubrelvy, zanaflex, flurbiprofen - not helping   Vision - nml      Prior note:   HA stable   Try Ubrelvy again      Prior note:   independent left and right parietal ice prick HA      Prior note:   HA much improved   Only 2 since last visit      Prior note:   HA are more frequent   Taking 1600 mg motrin + zanaflex   Went to ER recently      Prior note:   HA overall stable in Hz and intensity   Reports a wearing off effect of BOTOX since last week   Taking zanaflex 8 mg TID        Prior note:   HA started 12/24   She feels BOTOX wore off last week   Reports GI distress from taking to many motrin      Prior note:   4/week HA - L vertex   Jabs - vertex either sides      She reports migraine that woke her up in the morning - associated with L side facial droop      Prior note:   She gets acceptable relief for 2.5 months after BOTOX      Prior note:   No recurrent stroke symptoms   starting having whole body tremors since this AM. Took 1 tablet of lorazepam   Last night had a HA, took  trazodone       Admit Date: 4/11/2018  2:03 PM   Discharge Date and Time: 4/12/2018  8:30 PM   History of Present Illness: Ms. Chawla is a 51 yo F with afib on Eliquis (last dose this am), prior cardiac arrest with associated acute ischemic stroke with residual mild L sided weakness, CAD with ICD in situ, HTN, and HLD who presented with acute R sided weakness and sensation changes.  She originally called EMS for palpitations, but developed acute right sided facial droop and RUE weakness at 1:40pm today, after EMS had arrived.  She denies changes to speech or vision.  SBP en route 200/100.  She is ineligible for tPA 2/2 Eliquis use.  She is not suspected to have an LVO.  She will be admitted to  for observation overnight.     Hospital Course (synopsis of major diagnoses, care, treatment, and services provided during the course of the hospital stay): Ms. Chawal was admitted for acute stroke workup. Pt with pacemaker so unable to obtain MRI Brain; CTA H/N demonstrated no evidence acute infarction, though did exhibit noncalcified plaquing of carotid bifurcations and proximal ICAs with < 50% stenosis, so Plavix was added to pt's daily home regimen. Concerned for TIA at this time though Migraine very high on differential due to pt's hx headaches, including presently this admission. Hypertensive urgency/emergency also considered due to pt's very elevated levels with EMS. Per chart review, Eliquis was a new medication since 4/3/18 (had switched from Coumadin), however staff Faraz concerned that pt had a potential event while on Eliquis. She wished to switch to Pradaxa as an alternative DOAC (as it has an option for reversal), however changed to Xarelto per pt's insurance coverage.   While admitted, pt given cocktail for HA which she has received previously at the infusion clinic - IV Magnesium, IM Toradol, 2mg IV Morphine, 500cc NS bolus (IV Benadryl not included this time as pt had received a dose earlier that day  prior to contrast imaging.) HA improved somewhat and pt was encouraged to follow up with Dr. Cortez for continued management of botox injections, etc. At that time, pt also found with hyponatremia; d/c'd Clorthalidone and plan was made for pt to [facility]16[facility]18 for repeat CMP to evaluate Na level and BP check since discontinuing this medication.  Ms. Chawla was evaluated and treated by PT, OT, and SLP who recommend d/c with outpatient PT and OT. She was discharged home 4/12/18 with [facility] follow-up Monday      Prior note:   2 weeks ago BOTOX effect wore off   Used up all her percocet   3 wks h/o:   Reports frequent falls - falling forward, no LOC, no injuries, able to protect falls by hands   triggers - unknown   Also occ stumbling   Dragging L foot   No Pain in back or legs   No numbness or weakness in legs   No B/B incontinence   No lightheadedness      Prior note:    Flare up of TMJ and HA during daughter's wedding   NSAIDS helped   2 weeks ago BOTOX effect wore off      Prior note:   2 weeks ago BOTOX effect wore off   Has severe spasm and pain in the traps catalina   Weather change has provoked severe migraine + nausea   She started coumadin       Prior note:   3 weeks ago BOTOX effect wore off   She reports severe daily HA    During BOTOX periods she feels she  her HA. Much less intense      Prior note:   The patient is a 51-year-old female who [facility]. Since her last visit, she reports growing frustration   about the fact that nothing has helped her with regards to her headaches. She   continues to experience daily headaches. She takes mefenamic acid and   tizanidine every night, which only provides her two hours of relief. She is   unable to sleep because of the refractory headaches. She is incapacitated   because of severe headaches. She reports minimal relief with infusions that she  gets on a periodic basis.  "She does report an improvement overall with the   Botox. However, this effect has worn off in the last two weeks. She also   reports an episode wherein she fell with loss of consciousness, which was not   provoked. As a result, she injured her ankle and is currently wearing a boot.      Prior note:   since last week - Reports vertigo for 6 - 7 minutes upto 3 times daily   Nearly daily severe HA - no response to ponstel , compazine   She is late for her BOTOX - last 2 weeks were painful       Follow up:   R sided Headache is constant max at TMJ on R   Prior note:   She reports new episodes of R face tingling. Sh also reports 2 episodes of blurred vision lasting 15 minutes. These hapended while watching TV. Her pain remains unchanged   Follow up:   2 days of severe HA during Thanksgiving   Pounding bifrontal region   Pain worse over L eye brow   Follow up:   Reports bifrontal severe HA (worse on L) with no nausea with sudden onset . Occurs daily   NO note:  I found no papilledema or other changes to suggest elevated intracranial pressure or other alternative etiology for her headaches. Repeat exam in two years.  HA are less frequent and less intense.   (R levator scapula spasm)   symptoms better with lateral flexion to L   Prior note:   She still has daily HA with severe sharp stabbing pain behind L eye lasting for 15 sec   Prior note:   Daily pain. 2/week - nausea.  Shu Lares RN at 9/17/2014 4:53 PM  Pt presented to clinic for medical advice. Pt is seen by Dr. Paredes and Dr. Cortez.  1) She went ER on 9/13/14 for a migraine. She was given Reglan, Benadryl, MSO4 and IVF. A couple days later she developed an all over body "tremor". She states "I think I have Parkinson's". - Per Dr. Paredes, medication related tremor (Reglan & Benadryl). Informed her it should wear off in a few days. If not, she is to call and let us know.  2) Headaches - triggered by insomnia.   Current meds:  Ketoprofen - she took it once and " "said her "throat closed up" and she's not supposed to have anything with aspirin in it.  Nortriptyline - she was taking it at night, but it didn't help her sleep, so she stopped it.  Per Dr. Cortez, discontinue Ketoprofen and Nortriptyline. Start Effexor XR 37.5mg QD, tizanidine 4mg BID PRN headache and Klonopin 0.25 - 0.5mg QHS PRN. Will schedule pt for sphenocath procedure within the next week.   Prior note:   46 yo woman with history of HTN and asthma, both reportedly controlled, in hospital early 2013 after "passed out" and became unresponsive. CPR in the field for 8 minutes until EMS arrived. Per ED note, on arrival she was found to be in PEA, given epinephrine. Vfib/Vtach was achieved which was shocked x1. Patient converted to sinus tachycardia after shock. I do not know the time course for the above treatment. On arrival to facility patient was in sinus tachycardia with strong pulses, intubated and unresponsive. ET tube placement was confirmed with direct laryngoscopy and good bilateral breath sounds were heard after the tube was pulled back 2 cm. Reported to have "withdrawal" movements in ER during stabilization, then sedated and cooling protocol initiated. Had remarkable   recovery and first seen in clinic in April 2013.   Headache history: cluster   Age of onset - 2012   Location - behind L eye   Nature of pain - sharp   Prodrome - no   Aura - No   Duration of headache - 6-7 min   Time to peak intensity -   Pain scale - range of intensity - 9/10   Frequency - daily   Status Migrainosus history - 1-3 days   Time of day of most headaches- anytime   Associated symptoms with the headache:   Red eyes   swelling of L face   Headache history: Migraine   Age of onset - 2012   Location - bifrontal   Nature of pain - Pounding   Prodrome - no   Aura - No   Duration of headache - > 4 hrs   Time to peak intensity -   Pain scale - range of intensity - 9/10   Frequency - 5/week   Status Migrainosus history - 1-3 days " "  Time of day of most headaches- anytime   Associated symptoms with the headache:   Meningeal symptoms - photophobia, phonophobia, exercise intolerance +   Nausea/vomitting +   Nasal drainage   Visual blurriness +   Pallor/flushing   Dizziness   Vertigo   Confusion   Impaired concentration +   Pain worsened with physical activity +   Neck pain +   Symptoms of increased intracranial pressure:   Whooshing sounds - no   Visual spots/blotches - no   Headache Triggers: unknown   Other comorbid conditions:   Anxiety - no   Motion sickness symptom - no       Treatment history:   Resolution of headache with sleep - yes       Meds tried:   Trigger points involvin/9/15 helped for 1 day   Site: Right   Levator scapula   Pharmacological agent:   0.5% Sensorcaine solution   No complications were noted.  Supraorbital block - helped for 2 days   Tylenol - not help   imitrex - chest tightness   alleve - help   topamax - not helping   Neurontin - not helping   Paxil, Elavil - not help   seroquel - nightmare   Ketoprofen - she took it once and said her "throat closed up" and she's not supposed to have anything with aspirin in it.  Flurbiprofen - constipation   Nortriptyline - she was taking it at night, but it didn't help her sleep, so she stopped it.  reglan - parkinsonism   zanaflex - help   Toradol 30 mg IM inj at home - too expensive   BOTOX round #1 9/3/15 - helped (R levator scapula spasm)   Round #2 12/3/15 - helped   Round#3 3/316 - helped   Round #4 16 - helped   BOTOX#5 9/15/16 - helped     BOTOX#6 - 16 - helped   BOTOX#7 - 3/9/17 - helped    BOTOX#8 - 17 - helped    BOTOX#9 8/10/17 - helped   BOTOX#10 10/19/17 - helped   BOTOX#11 17 - helped   BOTOX#12 3/7/18 - helped   BOTOX#13 18 - helped    BOTOX#14 18 - helped     BOTOX#15 10/19/18 - helped      BOTOX#16 18 - helped   BOTOX#17 3/13/19 - helped    BOTOX#18 19 - helped     BOTOX#19 19 - helped      BOTOX#20 10/11/19 - " helped     BOTOX#21 12/19/19 - helped   BOTOX#22 3/10/20 - helped    BOTOX#23 6/26/20 - helped   BOTOX#24 11/12/20 - helped  BOTOX#25 1/21/21 - helped   BOTOX#26 4/22/21 - helped   BOTOX#27 7/6/21 - helped    BOTOX#28 12/10/21 - helped     BOTOX#29 5/19/22 - helped   BOTOX#30 8/25/22 - helped  BOTOX#31 12/2/22 - helped    AIMOVIG (sept 1rst week, Oct first week, Nov first wk 140 mg, Dec first wk 140 mg, Jan, Feb) no benefit   Constipation +      Can not do RFA - pt has pacemaker   Procedure: IOVERA peripheral nerve focus cold therapy (non ablative cryotherapy) 12/30/15 - not help   Indication: Cryotherapy of select peripheral nerves of the head was performed to treat chronic headaches that failed to respond to several preventive pharmacological therapies.   Sedation: None   Informed consent: The procedure, risks, benefits and options were discussed with the patient. There are no contraindications to the procedure. The patient expressed understanding and agreed to the procedure. I verify that I personally obtained the patient's consent prior to the start of the procedure.  Location:  Bilateral Supra orbital nerve (3 cycles on R and 1 cycle on L)   Bilateral Occipital nerve   3 cycles   Pain relief: Pain score reduced 10/10->0/10   9/26 Bilateral Sphenopalatine Ganglion and bilateral Maxillary Branch (Trigeminal Nerve) Block - no help   ONB - not help     georges Pagan RN at 12/31/2014 3:54 PM                           Status: Signed                                       Expand All Collapse All   HEADACHE FASTTRACK  PAIN 7/10 NAUSEA 0/10  ROUND 1: TORADOL, IMITREX, MORPHINE, BENADRYL, AND MAGNESIUM  PAIN 7/10 NAUSEA 0/10  PT STATED SHE WAS READY TO GO HOME AND GO TO SLEEP. PT D/C'ED IN [facility]                              Shannon Pagan RN at 10/5/2015 12:49 PM                           Status: Signed                                       Expand All Collapse All   HEADACHE FASTTRACK  PAIN 8/10 NAUSEA 10/10  FIRST ROUND:  tORADOL, BENADRYL, COMPAZINE, IMITREX, MAGNESIUM, AND VALPROATE.  PAIN 1/10 NAUSEA 0/10  PT READY TO GO HOME AND FEELING BETTER. PT LEFT IN NAD WITH FAMILY MEMBER  TOTAL TIME: 6708-7033                         Shannon Pagan RN at 10/20/2015 3:05 PM                           Status: Signed                                       Expand All Collapse All   HEADACHE FASTTRACK  PAIN 10/10 NAUSEA 0/10  FIRST ROUND: tORADOL, BENADRYL, COMPAZINE, IMITREX, MAGNESIUM, AND VALPROATE.  BP BEING MONITORED EVERY 15 MIN AND REMAINED 170'S/80-90'S. DR CHOPRA NOTIFIED AND STATED TO MAKE SURE BP DID NOT EXCEED 180 SYSTOLIC OR PT SHOULD REPORT TO ED. BP AT 1500 183/92. DR CHOPRA NOTIFIED AND GAVE ORDER TO STOP INFUSION AND SEND PATIENT TO ED. PT BROUGHT TO ED BY INFUSION NURSE VIA WHEELCHAIR.   PAIN 8/10 NAUSEA 0/10  TOTAL TIME: 5200-4992                                                     Progress Notes                                               Shannon Pagan RN at 2/24/2016 1:36 PM       Status: Signed                  Expand All Collapse All   HEADACHE FASTTRACK  PAIN 10/10 NAUSEA 7/10  FIRST ROUND: tORADOL, BENADRYL, AND MORPHINE-BP RANGING FROM 177-203/84-92, HR 60-82  MD NOTIFIED AND GAVE ORDERS TO SEND TO ED. PT LEFT VIA W/C WITH INFUSION NURSE ON WAY TO ED.            Headache risk factors:   H/o TBI - [facility] (2013) + neck sprain   H/o Meningitis - no   F/h Aneurysms - no       Review of Systems   Constitutional: Negative.   HENT: Negative.   Eyes: Negative.   Respiratory: Negative.   Cardiovascular: Negative.   Gastrointestinal: Negative.   Endocrine: Negative.   Genitourinary: Negative.   Musculoskeletal: Negative.   Skin: Negative.   Allergic/Immunologic: Negative.   Hematological: Negative.   Psychiatric/Behavioral: insomnia      Objective:     Physical Exam   Constitutional: She is oriented to person, place, and time. She appears well-developed.   obesity   Psychiatric: She has a normal mood and flat affect. Her behavior is  normal. Judgment and thought content normal.   Headache Exam:  Optic disc is flat OU   Temples appear symmetric  No V1,V2,V3 distribution allodynia/hyperalgesia catalina   No facial swelling  No gross TMJ abnormalities   No ptosis   No conj injection   No meningismus   No neck stiffness  No C spine tenderness  Cervical facet tenderness on palpation catalina   No tender points over trapezium   catalina supraorbital nerve exit point tenderness  catalina occipital nerve exit point tenderness  No auriculotemporal nerve tenderness   Tenderness of levator scapula catal[place]    Gait - wide based gait                       Assessment:                              1.  Occipital neuralgia                              2.  Cervicalgia                              3.  4  5  6 Chronic migraine without aura, with intractable migraine, so stated, with status migrainosus (post traumatic) post concussive?  Depression   TMJ - R sided   Insomnia - SHIRA      Head CT  Findings: There is no evidence of acute or recent major vascular territory cerebral infarction, parenchymal hemorrhage, or intra-axial mass.  There are no extra-axial masses or abnormal fluid collections.  The ventricular system is within normal limits of size for age and shows no distortion by mass-effect or evidence of hydrocephalus.  There is no fracture or destructive osseous lesion.  The extracranial soft tissues are unremarkable.  The visualized paranasal sinuses and mastoid air cells are clear.   Impression    No acute intracranial abnormality.  ______________________________________     Electronically signed by resident: KRISSY MAYERS MD  Date: 03/14/16  Time: 17:09            Plan:                              1. Prophylactic medication -   Despiramine 25 mg AM (for dizziness) PRN  zoloft 25 mg daily    Aricept 20 mg daily   Neurontin 900 mg TID    Magnesium 200 mg daily  Topamax 200 mg BID   Clonazepam BID for anxiety PRN  On trazodone   Cont B2, B6 supplements  On bellsomra    2,  Breakthrough headache - zanaflex 8 mg Q8, etodolac  Multiple alternative treatment options were offered to the patient   3. Refrain from over counter pain medications. Discussed medication overuse headache.cont Magnesium 400 mg PO BID   4. Occipital neuralgia - If medical management is ineffective may consider occipital nerve blocks.   5. I again urged the patient to keep a headache calender.    f/up Dr. Rock for TBI   Vit D supplements    f/up sleep clinic - CPAP - waiting for new machine   Refer to PMR for gait assessment   Cont AJOVY (April 2019- ) - gap Feb-April 2021)  No side effects      ONB 2 weeks prior to next BOTOX       BOTOX was performed as an indicated therapy for intractable chronic migraine headaches given that the patient failed > 5 prophylactic medications  Botulinum Toxin Injection Procedure   Pre-operative diagnosis: Chronic migraine   Post-operative diagnosis: Same   Procedure: Chemical neurolysis   After risks and benefits were explained including bleeding, infection, worsening of pain, damage to the areas being injected, weakness of muscles, loss of muscle control, dysphagia if injecting the head or neck, facial droop if injecting the facial area, painful injection, allergic or other reaction to the medications being injected, and the failure of the procedure to help the problem, a signed consent was obtained.   The patient was placed in a comfortable area and the sites to be treated were identified.The area to be treated was prepped three times with alcohol and the alcohol allowed to dry. Next, a 30 gauge needle was used to inject the medication in the area to be treated.   Area(s) injected:   Total Botox used: 160 Units   Botox wastage: 40 Units   Injection sites:     muscle bilaterally ( a total of 5 units divided into 2 sites)   Procerus muscle (5 units)   Frontalis muscle bilaterally (a total of 20 units divided into 4 sites)   Temporalis muscle bilaterally (a total of 50  units divided into 8 sites)   Occipitalis muscle bilaterally (a total of 30 units divided into 6 sites)   Cervical paraspinal muscles (a total of 20 units divided into 4 sites)   Trapezius muscle bilaterally (a total of 30 units divided into 6 sites)   Masseter 5 units catalina      Complications: none       RTC for the next Botox injection: 10 weeks    Procedures

## 2023-03-07 ENCOUNTER — CLINICAL SUPPORT (OUTPATIENT)
Dept: REHABILITATION | Facility: HOSPITAL | Age: 58
End: 2023-03-07
Attending: PHYSICAL MEDICINE & REHABILITATION
Payer: MEDICARE

## 2023-03-07 DIAGNOSIS — M54.41 CHRONIC MIDLINE LOW BACK PAIN WITH BILATERAL SCIATICA: ICD-10-CM

## 2023-03-07 DIAGNOSIS — R29.898 LEFT LEG WEAKNESS: ICD-10-CM

## 2023-03-07 DIAGNOSIS — Z74.09 IMPAIRED FUNCTIONAL MOBILITY, BALANCE, GAIT, AND ENDURANCE: Primary | Chronic | ICD-10-CM

## 2023-03-07 DIAGNOSIS — I69.354 HEMIPARESIS AFFECTING LEFT SIDE AS LATE EFFECT OF STROKE: ICD-10-CM

## 2023-03-07 DIAGNOSIS — G89.29 CHRONIC MIDLINE LOW BACK PAIN WITH BILATERAL SCIATICA: ICD-10-CM

## 2023-03-07 DIAGNOSIS — M54.42 CHRONIC MIDLINE LOW BACK PAIN WITH BILATERAL SCIATICA: ICD-10-CM

## 2023-03-07 DIAGNOSIS — R26.81 GAIT INSTABILITY: ICD-10-CM

## 2023-03-07 PROCEDURE — 97161 PT EVAL LOW COMPLEX 20 MIN: CPT | Mod: PO

## 2023-03-07 NOTE — PLAN OF CARE
"[facility]  Physical Therapy Neurological Rehabilitation Initial Evaluation    Name: Amna Chawla  Clinic Number: [id number]    Therapy Diagnosis:   Encounter Diagnoses   Name Primary?    Gait instability     Hemiparesis affecting left side as late effect of stroke     Chronic midline low back pain with bilateral sciatica     Impaired functional mobility, balance, gait, and endurance Yes    Left leg weakness         Physician: Hugo Borden MD    Physician Orders: PT Eval and Treat   Medical Diagnosis from Referral:   R26.81 (ICD-10-CM) - Gait instability   I69.354 (ICD-10-CM) - Hemiparesis affecting left side as late effect of stroke   M54.41,M54.42,G89.29 (ICD-10-CM) - Chronic midline low back pain with bilateral sciatica     Evaluation Date: 3/7/2023  Authorization Period Expiration: 12/29/2023  Plan of Care Expiration: 05/02/2023  Progress note due: 04/07/2023  Visit # / Visits authorized: 1/ 1    Time In: 8:38 am  Time Out: 9:25 am  Total Billable Time: 47 minutes    Precautions: Standard and blood thinners    Subjective   Date of onset: history of stroke in 2013 with residual left hemiparesis    History of current condition - Amna reports: Her back and L foot are hurting because she has been trying to stop compensating so much on the R LE (trying to use her L LE more). She has been stumbling more often lately and is scared of falling. She stumbled and hit the door Wednesday, leaving a bruise on her L arm. Her lower back only bothers her when her sciatic nerve "is in full effect." Pt states she usually takes a Lyft, but her daughter and son in law brought her today. She is still fatigued and has a cough from having Covid-19 in January 2023.     Medical History:   Past Medical History:   Diagnosis Date    Anticoagulant long-term use     Anxiety     Asthma     Atrial fibrillation     Brain anoxic injury     Cervicalgia 8/28/2014    CHI (closed head injury) 2/19/2013    " Convulsion 5/30/2015    Decreased ROM of left shoulder 4/12/2017    Defibrillator activation 2013    Depression     Heart block     History of sudden cardiac arrest 2/2013    PEA arrest with subsequent long-QT    Hx of psychiatric care     Hypertension     Left atrial enlargement 4/11/2018    Pacemaker 2013    Paresthesia 11/1/2013    Prolonged Q-T interval on ECG 2/8/2013    Psychiatric problem     Seizures     Sleep difficulties     Stroke     weakness lt side    Therapy     Thyroid disease     Upper airway resistance syndrome 2/21/2017       Surgical History:   Amna Chawla  has a past surgical history that includes Cardiac defibrillator placement; Hiatal hernia repair; breast reduction; Tubal ligation; Cardiac defibrillator placement; Hernia repair; Carpal tunnel release (Right); Insert / replace / remove pacemaker (10/2017); Total Reduction Mammoplasty; Colonoscopy (N/A, 5/7/2019); Trigger finger release (Left, 8/2/2019); Trigger finger release (Right, 2/11/2020); Revision of implantable cardioverter-defibrillator (ICD) electrode lead placement (Left, 12/7/2020); Revision of skin pocket for cardioverter-defibrillator (12/7/2020); Transesophageal echocardiography (N/A, 12/7/2020); Transesophageal echocardiography (N/A, 12/7/2020); Esophagogastroduodenoscopy (N/A, 6/2/2022); and Colonoscopy (N/A, 6/2/2022).    Medications:   Amna has a current medication list which includes the following prescription(s): acetaminophen, amlodipine, aripiprazole, blood sugar diagnostic, blood-glucose meter, carvedilol, cetirizine, clonazepam, clopidogrel, cyanocobalamin (vitamin b-12), diclofenac sodium, donepezil, epinephrine, etodolac, famotidine, flurbiprofen, flurbiprofen, fremanezumab-vfrm, furosemide, gabapentin, hydroxyzine hcl, lancets, losartan, magnesium, mupirocin, omeprazole, ondansetron, rivaroxaban, rosuvastatin, sertraline, silver sulfadiazine 1%, belsomra, tiagabine, tizanidine, trazodone, triamcinolone  "acetonide 0.1%, true metrix glucose meter, ubrelvy, zolpidem, and [DISCONTINUED] metformin, and the following Facility-Administered Medications: ketorolac, ketorolac, lactated ringers, lidocaine (pf) 10 mg/ml (1%), onabotulinumtoxina, onabotulinumtoxina, onabotulinumtoxina, onabotulinumtoxina, onabotulinumtoxina, onabotulinumtoxina, onabotulinumtoxina, onabotulinumtoxina, and triamcinolone acetonide.    Allergies:   Review of patient's allergies indicates:   Allergen Reactions    Aspirin Hives    Imitrex [sumatriptan] Palpitations    Penicillins Hives and Swelling    Shellfish containing products Anaphylaxis     seafood    Reglan [metoclopramide hcl] Other (See Comments)     Parkinsonism         Imaging, CT scan films: EXAMINATION:  CT HEAD WITHOUT CONTRAST     CLINICAL HISTORY:  Headache, sudden, severe;Strong HA, 3 hrs.;     TECHNIQUE:  Low dose axial images were obtained through the head.  Coronal and sagittal reformations were also performed. Contrast was not administered.     COMPARISON:  09/14/2020     FINDINGS:  No midline shift, hydrocephalus or mass effect.  No acute intracranial hemorrhage or acute major vascular territory infarct.  Small right remote cerebellar infarct.  No abnormal extra-axial fluid collections.  There is an empty sella.  Calvarium intact without evidence of fracture.  Paranasal sinuses and mastoid air cells are essentially clear.     Impression:     No acute intracranial abnormalities.  No significant detrimental changes from prior.     From office visit with Dr. Borden on 2/2/2023:   "X-rays of the lumbar spine were ordered and showed maintained disc spaces, and L4-5 and L5-S1 facet arthropathy, with grade 1 anterolisthesis of L4 on L5."      Prior Therapy: Came to PT in 2013 after going to a rehab facility following stroke. She went to [facility] before coming to [facility] and was working with [employer] where she regained a large amount of mobility.  Social History:  she lives with her " "daughter.  Falls: pt states she only stumbles and has not fallen to floor or hit her head recently.    DME: has a rollator and a cane she will use when her sciatic nerve is acting up.     Home Environment: lives in an apartment and does not have to use stairs. She does have to step into her tub. It is not a handicap apartment.   Exercise Routine / History: she walks around her apartment all day. She does not do her HEP provided in prior physical therapy episodes.    Family Present at time of Eval: none   Occupation: on disability; used to work as a .   Prior Level of Function: independent, worked and drove herself. She enjoyed decorating her house and going out for drinks with her high heel shoes.   Current Level of Function: transfers independently but performs transitions/ambulates slowly. Pt able to perform stair negotiation, but with great fear of falling due to the possibility of her L LE buckling. She is able to perform all self care, it just takes her some time.  She fatigues quickly, especially since she acquired Covid-19 at the beginning of the 2023.    Pain:  Current 8/10, worst 10/10, best 5/10   Location: left foot   Description: Sharp  Aggravating Factors: Standing and Walking  Easing Factors: relaxation; pain does ease up after walking for a while.     Pts goals: wants to be able to walk on L foot "like before" with no problems and have full strength in her L hand. Wants her L and R sides to be equal.     Objective     Blood pressure:  116/79 (taken in seated position on high-low mat)    Observation: pleasant and cooperative   Speech: clear and coherent    Mental status: alert, oriented to person, place, and time  Appearance: Casually dressed  Behavior:  calm and cooperative  Attention Span and Concentration:  Normal    Posture Alignment :slouched posture, forward head, rounded shoulders, posterior pelvic tilt in sitting.     Dominant hand:  right     Skin integrity:  Intact, ~2 inch " circumferential hematoma on L arm secondary to a stumble resulting in pt hitting the handle of a door.     Visual/Auditory: farther distances have become harder to see recently (within the recent months). She used to wear glasses, but has not been recently which has likely contributed to difficulty seeing farther distances.     ROM:   GROSS AROM/PROM  UPPER EXTREMITY:   (R) UE: limited as follows: decreased internal rotation (likely due to adipose distribution), decreased shoulder flexion  (L) UE: limited as follows: decreased internal rotation (likely due to adipose distribution), decreased shoulder flexion, (L>R)  LOWER EXTREMITY  (R) LE: WFL as observed during MMT  (L) LE: WFL as observed during MMT     Lower Extremity Strength (tested in sitting)  Right LE Eval Left LE Eval   Hip flexion:  5/5 Hip flexion: 4-/5   Knee extension: 5/5 Knee extension: 4/5   Knee flexion: 5/5 Knee flexion: 5/5   Ankle dorsiflexion:  5/5 Ankle dorsiflexion: 5/5   Ankle plantarflexion:  5/5 Ankle plantarflexion: 4+/5   Hip abduction: 5/5 Hip abduction: 5/5   Hip adduction: 5/5 Hip adduction 4/5   Hip extension: 5/5 Hip extension: 4/5     Upper extremity strength:     Right Left   Shoulder Flexion: 5/5 4/5   Shoulder Abduction: 5/5 4+/5   Shoulder Extension: 5/5 4+/5   Elbow Flexion: 5/5 4+/5   Elbow Extension: 5/5 4+/5    5/5 4+/5     Coordination:   Rapid Alternating Movements: NT  Point to Point:    -Finger to Nose: NT   -Heel to Shin: NT    Sensation: able to sense light touch in all distal extremities except for great toe on the L. Pt states she feels less on the L side in both UE and LE. Pt denies difficulty differentiating hot and cold water in the shower.     Proprioception: NT    Tone/Spasticity: not tested formally,but no gross abnormalities noted    Functional Mobility (Bed mobility, transfers)  Sit to stand:  mostly I, S when pt was mildly fatigued.      Performed using wide blue chair:    Evaluation   Single Limb  Stance R LE NT  (<10 sec = HIGH FALL RISK)   Single Limb Stance L LE NT  (<10 sec = HIGH FALL RISK)   5 times sit to stand 16 seconds, hands on thighs, performed on blue bench   TUG 12 seconds   Self selected walking speed 0.75 m/sec (6m/8s)   Fast walking speed 1.0 m/sec (6m/6s)       Following 5 times sit to stand:   SpO2: 94 %, HR: 81     5x sit to stand normative information:   >12 sec= fall risk for general elderly  >16 sec= fall risk for Parkinson's disease  >10 sec= balance/vestibular dysfunction (<59 y/o)  >14.2 sec= balance/vestibular dysfunction (>59 y/o)  >12 sec= fall risk for CVA    Gait Assessment:   - AD used: pt presents without AD  - Assistance: mod I  - Stairs:  pt uses B hand rails. She demonstrated reciprocal gait pattern on ascent and non- reciprocal gait pattern on descent. Pt required SBA during stair negotiation.    GAIT DEVIATIONS:   Amna displays the following deviations with ambulation: shuffling gait on L, decreased ankle dorsiflexion B, increased stance time on L, B compensated trendelenburg, shortened stride length   Impairments contributing to deviations: impaired motor coordination, poor postural positioning, decreased L LE strength, decreased balance.       Functional Gait Assessment:   1. Gait on level surface =  1   (3) Normal: less than 5.5 sec, no A.D., no imbalance, normal gait pattern, deviates< 6in   (2) Mild impairment: 7-5.6 sec, uses A.D., mild gait deviations, or deviates 6-10 in   (1) Moderate impairment: > 7 sec, slow speed, imbalance, deviates 10-15 in.   (0) Severe impairment: needs assist, deviates >15 in, reach/touch wall  2. Change in Gait Speed = 1   (3) Normal: smooth change w/o loss of balance or gait deviation, deviates < 6 in, significant difference between speeds   (2) Mild impairment: changes speed, but demonstrates mild gait deviations, deviates 6-10 in, OR no deviations but unable to significantly speed, OR uses A.D.   (1) Moderate impairment: minor  changes to speed, OR changes speed w/ significant deviations, deviates 10-15 in, OR  Changes speed , but loses balance & recovers   (0) Severe impairment: cannot change speed, deviates >15 in, or loses balance & needs assist  3. Gait with horizontal head turns  = 1   (3) Normal: no change in gait, deviates <6 in   (2) Mild impairment: slight change in speed, deviates 6-10 in, OR uses A.D.   (1) Moderate impairment: moderate change in speed, deviates 10-15 in   (0) Severe impairment: severe disruption of gait, deviates >15in  4. Gait with vertical head turns = 1   (3) Normal: no change in gait, deviates <6 in   (2) Mild impairment: slight change in speed, deviates 6-10 in OR uses A.D.   (1) Moderate impairment: moderate change in speed, deviates 10-15 in   (0) Severe impairment: severe disruption of gait, deviates >15 in  5. Gait with pivot turns = 2   (3) Normal: performs safely in 3 sec, no LOB   (2) Mild impairment: performs in >3 sec & no LOB, OR turns safely & requires several steps to regain LOB   (1) Moderate impairment: turns slow, OR requires several small steps for balance following turn & stop   (0) Severe impairment: cannot turn safely, needs assist  6. Step over obstacle = 2   (3) Normal: steps over 2 stacked boxes w/o change in speed or LOB   (2) Mild impairment: able to step over 1 box w/o change in speed or LOB   (1) Moderate impairment: steps over 1 box but must slow down, may require VC   (0) Severe impairment: cannot perform w/o assist  7. Gait with Narrow GAGE = 0   (3) Normal: 10 steps no staggering   (2) Mild impairment: 7-9 steps   (1) Moderate impairment: 4-7 steps   (0) Severe impairment: < 4 steps or cannot perform w/o assist  8. Gait with eyes closed = 1   (3) Normal: < 7 sec, no A.D., no LOB, normal gait pattern, deviates <6 in   (2) Mild impairment: 7.1-9 sec, mild gait deviations, deviates 6-10 in   (1) Moderate impairment: > 9 sec, abnormal pattern, LOB, deviates 10-15 in   (0) Severe  impairment: cannot perform w/o assist, LOB, deviates >15in  9. Ambulating Backwards = 1   (3) Normal: no A.D., no LOB, normal gait pattern, deviates <6in   (2) Mild impairment: uses A.D., slower speed, mild gait deviations, deviates 6-10 in   (1) Moderate impairment: slow speed, abnormal gait pattern, LOB, deviates 10-15 in   (0) Severe impairment: severe gait deviations or LOB, deviates >15in  10. Steps = 1 (step to on descent, step through on ascent)   (3) Normal: alternating feet, no rail   (2) Mild Impairment: alternating feet, uses rail   (1) Moderate impairment: step-to, uses rail   (0) Severe impairment: cannot perform safely    Score 11/30     Score:   <22/30 fall risk   <20/30 fall risk in older adults   <18/30 fall risk in Parkinsons       Endurance Deficit: decreased SpO2 % following 5 times sit to stand, indicating mild to moderate deconditioning with light activity.    PT Evaluation Completed? Yes      CMS Impairment/Limitation/Restriction for FOTO Stroke Lower Extremity Survey    Therapist reviewed FOTO scores for Amna Chawla on 3/7/2023.   FOTO documents entered into [employer] - see Media section.    Limitation Score: 45%         TREATMENT   No treatment today; evaluation only.     Home Exercises and Patient Education Provided    Education provided:   - Pt educated regarding evaluation findings, potential plan of care and scheduling process.     Written Home Exercises Provided: to be provided in follow up visit.    Assessment   Amna is a 57 y.o. female referred to outpatient Physical Therapy with a medical diagnosis of Gait instability, Hemiparesis affecting left side as late effect of stroke, Chronic midline low back pain with bilateral sciatica. Pt presents to PT with the following impairments leading to his/her functional decline: mild strength deficits noted in L LE/UE, motor coordination deficits, and poor postural positioning. She required increased time to perform objective tests. However, she  performed most tests with relatively good form. Her FGA demonstrated great limitations with dynamic balance and gait speed. She has the greatest difficulty ambulating with narrowed base of support and decreased visual input. Her 5 times sit to stand performance was 16 seconds, demonstrating decreased LE strength and puts her in the category for increased fall risk. Pt also displays fear that increases her risk of falls due to slowed jessie during ambulation. Pt's fear of falling additionally limits her performance in stair negotiation. In addition to all the above, pt is limited by her weight. Overall, Ms. Woo is pleasant to work with and SPT believes pt will benefit from skilled physical therapy twice a week to address functional strength, balance, and endurance deficits displayed in objective tests/measures.     Pt prognosis is Good.   Pt will benefit from skilled outpatient Physical Therapy to address the deficits stated above and in the chart below, provide pt/family education, and to maximize pt's level of independence.     Plan of care discussed with patient: Yes  Pt's spiritual, cultural and educational needs considered and patient is agreeable to the plan of care and goals as stated below:     Anticipated Barriers for therapy: pt relies on transportation    Medical Necessity is demonstrated by the following  History  Co-morbidities and personal factors that may impact the plan of care Co-morbidities:   transportation assistance required and Asthma, HTN, cardiomyopathy, CVA with L HP 2013, R HP 2018, DVT, migraine HA, LBP, insomnia, GERD, occipital neuralgia, pre-diabetes, atrial-fibrillation, anxiety, migraine, cognitive deficits, history of cardiac arrest    Personal Factors:   lifestyle     high   Examination  Body Structures and Functions, activity limitations and participation restrictions that may impact the plan of care Body Regions:   lower extremities    Body Systems:    gross  symmetry  strength  gross coordinated movement  balance  gait  transfers  transitions  motor control  blood pressure    Participation Restrictions:   L foot pain, low back pain with sciatica    Activity limitations:   Learning and applying knowledge  no deficits    General Tasks and Commands  undertaking multiple tasks    Communication  no deficits    Mobility  lifting and carrying objects  driving (bike, car, motorcycle)    Self care  looking after one's health    Domestic Life  no deficits    Interactions/Relationships  no deficits    Life Areas  employment    Community and Social Life  community life  recreation and leisure         high   Clinical Presentation stable and uncomplicated low   Decision Making/ Complexity Score: low     Goals:  Short Term Goals: 8 weeks   Pt will be at least partially independent with initial installment of HEP.  Pt will improve her self- selected walking speed to 0.86 m/sec, demonstrating increased community ambulation.  Pt will improve her FGA score to 13/30, demonstrating increased safety with dynamic gait activities.  Pt will improve her 5 times sit to stand score to 14 seconds, indicating increased functional LE strength.    Long Term Goals: 8 weeks   Pt will report </= 41% limitation using FOTO assessment tool in order to demonstrate improved perception of abilities.   Pt will improve her self- selected walking speed to 1.0 m/sec , demonstrating increased community ambulation.  Pt will improve her fast- walking speed score to 1.2 m/sec, demonstrating increased safety with changes in gait speed during community ambulation.  Pt will improve her FGA score to 15/30, demonstrating increased safety with dynamic gait activities.   Pt will improve her 5 times sit to stand score to 12 seconds, indicating increased functional LE strength.  Pt will be independent with advanced HEP to help maintain potential gains realized in PT  Pt will ascend and descend 12 steps with use of rails while  exhibiting reciprocal pattern in each direction    Plan   Plan of care Certification: 3/7/2023 to 05/02/2023.    Outpatient Physical Therapy 2 times weekly for 8 weeks to include the following interventions: Gait Training, Manual Therapy, Neuromuscular Re-ed, Patient Education, Self Care, Therapeutic Activities, and Therapeutic Exercise.     Toby Ambrosio, PT   03/07/2023    I certify that I was present in the room directing the student in service delivery and guiding them using my skilled judgment. As the co-signing therapist, I have reviewed the student's documentation and am responsible for the treatment, assessment and plan.    Toby Ambrosio, PT   03/07/2023

## 2023-03-09 ENCOUNTER — CLINICAL SUPPORT (OUTPATIENT)
Dept: REHABILITATION | Facility: HOSPITAL | Age: 58
End: 2023-03-09
Payer: MEDICARE

## 2023-03-09 DIAGNOSIS — R29.898 LEFT LEG WEAKNESS: ICD-10-CM

## 2023-03-09 DIAGNOSIS — Z74.09 IMPAIRED FUNCTIONAL MOBILITY, BALANCE, GAIT, AND ENDURANCE: Primary | Chronic | ICD-10-CM

## 2023-03-09 PROCEDURE — 97110 THERAPEUTIC EXERCISES: CPT | Mod: PO

## 2023-03-09 PROCEDURE — 97112 NEUROMUSCULAR REEDUCATION: CPT | Mod: PO

## 2023-03-09 NOTE — PROGRESS NOTES
Physical Therapy Daily Treatment Note     Name: Amna Chawla  Clinic Number: [id number]    Therapy Diagnosis:   Encounter Diagnoses   Name Primary?    Impaired functional mobility, balance, gait, and endurance Yes    Left leg weakness      Physician: Hugo Borden MD    Visit Date: 3/9/2023    Physician Orders: PT Eval and Treat   Medical Diagnosis from Referral:   R26.81 (ICD-10-CM) - Gait instability   I69.354 (ICD-10-CM) - Hemiparesis affecting left side as late effect of stroke   M54.41,M54.42,G89.29 (ICD-10-CM) - Chronic midline low back pain with bilateral sciatica      Evaluation Date: 3/7/2023  Authorization Period Expiration: 12/29/2023  Plan of Care Expiration: 05/02/2023  Progress note due: 04/07/2023  Visit # / Visits authorized: 1/ 20(+evaluation)    PTA visit: 0/5    Time In: 11:00 am  Time Out: 11: 45 am  Total Billable Time: 45 minutes    Precautions: Standard and blood thinners    Subjective     Pt reports: She has a little head ache because she didn't eat breakfast today. She took her blood pressure medication this morning.    She was provided with home exercise program today.  Response to previous treatment: no adverse reactions following evaluation  Functional change: none    Pain: 6/10  Location: Headache (frontal region)    Objective     Amna received therapeutic exercises to develop strength, endurance, ROM, flexibility, posture, and core stabilization for 30 minutes including:    X 8 minutes on SCI-FIT recumbent stepper, level 3.0, for B UE and LE muscular and CV endurance    Pt instructed in and performs the below exercises as first installment to her HEP:    -2 x 10 sit to stands, B UEs on knees, SBA    On high-low mat:  -2 x 10 hip bridges with 2 second hold, green theraband   -2 x 10 side lying clamshells with 2 second hold, green theraband  -1 x 10 transverse abdominis activation, 10 second hold     In parallel bars:  2 x 15 Standing marching, B UE support      Amna  participated in neuromuscular re-education activities to improve: Balance, Coordination, Kinesthetic, Sense, Proprioception, and Posture for 15 minutes. The following activities were included:    X 2 full laps tandem walking, one finger support B, SBA  X 2 laps backwards walking, one finger support, B, SBA  X 3 laps forwards walking over 5 hurdles, no UE support, CGA    Amna participated in dynamic functional therapeutic activities to improve functional performance for 0  minutes, including:      Amna participated in gait training to improve functional mobility and safety for 0  minutes, including:        Home Exercises Provided and Patient Education Provided     Education provided:   - Pt educated regarding evaluation findings, potential plan of care and scheduling process.   - first installment of HEP provided 3/9/2023    Written Home Exercises Provided: yes.  Exercises were reviewed and Amna was able to demonstrate them prior to the end of the session.  Amna demonstrated good  understanding of the education provided.     See EMR under Patient Instructions for exercises provided 3/9/2023.    Assessment     Ms. Woo tolerated her initial follow up visit well with good tolerance to the SCI-FIT recumbent stepper. She was able to perform all home exercises provided with full independence and understanding of each task. During balance training, pt relies on increased base of support and B UE assistance for foot clearance during obstacle negotiation and will benefit from continued practice stepping over hurdles in future sessions. During backwards walking, pt often crosses B feet over midline, increasing her risk of falling. Her greatest limitation with regard to progressing gait speed and balance is her high fear of falling. Pt will benefit from stair negotiation as well as step ups onto foam fitter in future visits, addressing her fear of falling down the stairs. Overall, pt remains a good candidate for skilled  physical therapy addressing gait and balance deficits.     Amna Is progressing well towards her goals.   Pt prognosis is Good.     Pt will continue to benefit from skilled outpatient physical therapy to address the deficits listed in the problem list box on initial evaluation, provide pt/family education and to maximize pt's level of independence in the home and community environment.     Pt's spiritual, cultural and educational needs considered and pt agreeable to plan of care and goals.     Anticipated barriers to physical therapy: Goals:  Short Term Goals: 8 weeks   Pt will be at least partially independent with initial installment of HEP. Ongoing  Pt will improve her self- selected walking speed to 0.86 m/sec, demonstrating increased community ambulation.Ongoing  Pt will improve her FGA score to 13/30, demonstrating increased safety with dynamic gait activities.Ongoing  Pt will improve her 5 times sit to stand score to 14 seconds, indicating increased functional LE strength. Ongoing     Long Term Goals: 8 weeks   Pt will report </= 41% limitation using FOTO assessment tool in order to demonstrate improved perception of abilities. Ongoing  Pt will improve her self- selected walking speed to 1.0 m/sec , demonstrating increased community ambulation.Ongoing  Pt will improve her fast- walking speed score to 1.2 m/sec, demonstrating increased safety with changes in gait speed during community ambulation. Ongoing  Pt will improve her FGA score to 15/30, demonstrating increased safety with dynamic gait activities. Ongoing  Pt will improve her 5 times sit to stand score to 12 seconds, indicating increased functional LE strength. Ongoing  Pt will be independent with advanced HEP to help maintain potential gains realized in PT. Ongoing  Pt will ascend and descend 12 steps with use of rails while exhibiting reciprocal pattern in each direction. Ongoing    Plan     Progress gait and balance deficits.     Recommendations for  next treatment session: practice stair negotiation and perform more balance challenges. Attempt step ups on foam fitter.    Joann Rodriguez, SPT    03/09/2023    I certify that I was present in the room directing the student in service delivery and guiding them using my skilled judgment. As the co-signing therapist, I have reviewed the student's documentation and am responsible for the treatment, assessment and plan.    Toby Ambrosio, PT   03/09/2023

## 2023-03-10 ENCOUNTER — DOCUMENTATION ONLY (OUTPATIENT)
Dept: REHABILITATION | Facility: HOSPITAL | Age: 58
End: 2023-03-10
Payer: MEDICARE

## 2023-03-10 NOTE — PROGRESS NOTES
PT/PTA met face to face to discuss pt's treatment plan and progress towards established goals.  Continue with current PT POC with focus on strength, endurance, and balance.  Patient will be seen by physical therapist at least every sixth treatment or 30 days, whichever occurs first.    Julissa Fine, PTA  03/10/2023

## 2023-03-17 ENCOUNTER — CLINICAL SUPPORT (OUTPATIENT)
Dept: REHABILITATION | Facility: HOSPITAL | Age: 58
End: 2023-03-17
Payer: MEDICARE

## 2023-03-17 DIAGNOSIS — Z74.09 IMPAIRED FUNCTIONAL MOBILITY, BALANCE, GAIT, AND ENDURANCE: Primary | Chronic | ICD-10-CM

## 2023-03-17 DIAGNOSIS — R29.898 LEFT LEG WEAKNESS: ICD-10-CM

## 2023-03-17 PROCEDURE — 97112 NEUROMUSCULAR REEDUCATION: CPT | Mod: PO,CQ

## 2023-03-17 PROCEDURE — 97110 THERAPEUTIC EXERCISES: CPT | Mod: PO,CQ

## 2023-03-17 NOTE — PROGRESS NOTES
"  Physical Therapy Daily Treatment Note     Name: Amna Chawla  Clinic Number: [id number]    Therapy Diagnosis:   Encounter Diagnoses   Name Primary?    Impaired functional mobility, balance, gait, and endurance Yes    Left leg weakness      Physician: Hugo Borden MD    Visit Date: 3/17/2023    Physician Orders: PT Eval and Treat   Medical Diagnosis from Referral:   R26.81 (ICD-10-CM) - Gait instability   I69.354 (ICD-10-CM) - Hemiparesis affecting left side as late effect of stroke   M54.41,M54.42,G89.29 (ICD-10-CM) - Chronic midline low back pain with bilateral sciatica      Evaluation Date: 3/7/2023  Authorization Period Expiration: 12/29/2023  Plan of Care Expiration: 05/02/2023  Progress note due: 04/07/2023  Visit # / Visits authorized: 2/ 20(+evaluation)    PTA visit: 1/5    Time In: 8:30 am  Time Out: 09:15 am  Total Billable Time: 45 minutes    Precautions: Standard and blood thinners    Subjective     Pt reports: " My back is really hurting this morning, I'm moving slow. "    She was provided with home exercise program today.  Response to previous treatment: no adverse reactions   Functional change: none    Pain: 9/10  Location: Shoulder blades down to lumbar region    Objective     Amna received therapeutic exercises to develop strength, endurance, ROM, flexibility, posture, and core stabilization for 35 minutes including:    X 10 minutes on SCI-FIT recumbent stepper, level 3.0, for B UE and LE muscular and CV endurance    Sitting EOM:   1 x 5 reps of sit to stand     1 x 10 reps of forward ball rolls with blue physioball  1 x 5 reps each of side ways ball rolls with blue physioball    Supine   on 2 towels rolled up x 3 mins while performing LTR  X 15 reps of bridges with 3 sec hold,         Amna participated in neuromuscular re-education activities to improve: Balance, Coordination, Kinesthetic, Sense, Proprioception, and Posture for 10 minutes. The following activities were included:    Near " ballet bar:   4 point foam fitter board: CGA  X 60 sec of medial/lateral weight shifting, no UE support  X 60 sec of anterior/posterior weight shifting, no UE support  2 x 30 sec of static standing with no UE support and NBOS, no UE support  2 x 30 sec of static standing with no UE support, NBOS and eyes closed    Flat ground:  2 x 30 sec of tandem stance, occ 1 UE support    Amna participated in dynamic functional therapeutic activities to improve functional performance for 0  minutes, including:      Amna participated in gait training to improve functional mobility and safety for 0  minutes, including:        Home Exercises Provided and Patient Education Provided     Education provided:   - Pt educated regarding evaluation findings, potential plan of care and scheduling process.   - first installment of HEP provided 3/9/2023    Written Home Exercises Provided: yes.  Exercises were reviewed and Amna was able to demonstrate them prior to the end of the session.  Amna demonstrated good  understanding of the education provided.     See EMR under Patient Instructions for exercises provided 3/9/2023.    Assessment     Ms. Woo tolerated her tx session fairly well, but limited by back pain today.  Amna started the tx session with a 9/10 pain level and after stretching and exercises, was able to report her pain level decreased to 7/10.  Amna only required occ 1 UE support for safety with tandem stance, but no other support for other balance activities.      Amna Is progressing well towards her goals.   Pt prognosis is Good.     Pt will continue to benefit from skilled outpatient physical therapy to address the deficits listed in the problem list box on initial evaluation, provide pt/family education and to maximize pt's level of independence in the home and community environment.     Pt's spiritual, cultural and educational needs considered and pt agreeable to plan of care and goals.     Anticipated barriers to  physical therapy: Goals:  Short Term Goals: 8 weeks   Pt will be at least partially independent with initial installment of HEP. Ongoing  Pt will improve her self- selected walking speed to 0.86 m/sec, demonstrating increased community ambulation.Ongoing  Pt will improve her FGA score to 13/30, demonstrating increased safety with dynamic gait activities.Ongoing  Pt will improve her 5 times sit to stand score to 14 seconds, indicating increased functional LE strength. Ongoing     Long Term Goals: 8 weeks   Pt will report </= 41% limitation using FOTO assessment tool in order to demonstrate improved perception of abilities. Ongoing  Pt will improve her self- selected walking speed to 1.0 m/sec , demonstrating increased community ambulation.Ongoing  Pt will improve her fast- walking speed score to 1.2 m/sec, demonstrating increased safety with changes in gait speed during community ambulation. Ongoing  Pt will improve her FGA score to 15/30, demonstrating increased safety with dynamic gait activities. Ongoing  Pt will improve her 5 times sit to stand score to 12 seconds, indicating increased functional LE strength. Ongoing  Pt will be independent with advanced HEP to help maintain potential gains realized in PT. Ongoing  Pt will ascend and descend 12 steps with use of rails while exhibiting reciprocal pattern in each direction. Ongoing    Plan     Progress gait and balance deficits.     Recommendations for next treatment session: practice stair negotiation and perform more balance challenges. Attempt step ups on foam fitter.    Julissa Fine, PTA    03/09/2023

## 2023-03-20 ENCOUNTER — DOCUMENTATION ONLY (OUTPATIENT)
Dept: REHABILITATION | Facility: HOSPITAL | Age: 58
End: 2023-03-20
Payer: MEDICARE
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
## 2023-03-20 NOTE — PROGRESS NOTES
Amna did not attend her scheduled 9:30 AM physical therapy appointment today. This is her first missed visit. Her next scheduled session is on 3/24/23. No charges posted to account.    Toby Ambrosio, PT  3/20/2023

## 2023-03-21 ENCOUNTER — OFFICE VISIT (OUTPATIENT)
Dept: ELECTROPHYSIOLOGY | Facility: CLINIC | Age: 58
End: 2023-03-21
Payer: MEDICARE

## 2023-03-21 ENCOUNTER — CLINICAL SUPPORT (OUTPATIENT)
Dept: CARDIOLOGY | Facility: HOSPITAL | Age: 58
End: 2023-03-21
Attending: INTERNAL MEDICINE
Payer: MEDICARE

## 2023-03-21 ENCOUNTER — HOSPITAL ENCOUNTER (OUTPATIENT)
Dept: CARDIOLOGY | Facility: CLINIC | Age: 58
Discharge: HOME | End: 2023-03-21
Payer: MEDICARE

## 2023-03-21 VITALS
HEIGHT: 64 IN | WEIGHT: 293 LBS | SYSTOLIC BLOOD PRESSURE: 145 MMHG | DIASTOLIC BLOOD PRESSURE: 68 MMHG | BODY MASS INDEX: 50.02 KG/M2

## 2023-03-21 DIAGNOSIS — I49.8 OTHER SPECIFIED CARDIAC ARRHYTHMIAS: ICD-10-CM

## 2023-03-21 DIAGNOSIS — E78.2 MIXED HYPERLIPIDEMIA: ICD-10-CM

## 2023-03-21 DIAGNOSIS — I10 ESSENTIAL HYPERTENSION: ICD-10-CM

## 2023-03-21 DIAGNOSIS — Z86.74 HISTORY OF SUDDEN CARDIAC ARREST: ICD-10-CM

## 2023-03-21 DIAGNOSIS — Z86.73 HISTORY OF CVA (CEREBROVASCULAR ACCIDENT): ICD-10-CM

## 2023-03-21 DIAGNOSIS — Z95.810 BIVENTRICULAR AUTOMATIC IMPLANTABLE CARDIOVERTER DEFIBRILLATOR IN SITU: Primary | ICD-10-CM

## 2023-03-21 DIAGNOSIS — I48.0 PAROXYSMAL ATRIAL FIBRILLATION: ICD-10-CM

## 2023-03-21 DIAGNOSIS — I44.2 COMPLETE AV BLOCK: ICD-10-CM

## 2023-03-21 DIAGNOSIS — I42.8 NONISCHEMIC CARDIOMYOPATHY: ICD-10-CM

## 2023-03-21 DIAGNOSIS — G47.33 OBSTRUCTIVE SLEEP APNEA: ICD-10-CM

## 2023-03-21 DIAGNOSIS — Z79.01 LONG TERM (CURRENT) USE OF ANTICOAGULANTS: ICD-10-CM

## 2023-03-21 DIAGNOSIS — R53.83 OTHER FATIGUE: ICD-10-CM

## 2023-03-21 PROCEDURE — 99999 PR PBB SHADOW E&M-EST. PATIENT-LVL IV: ICD-10-PCS | Mod: PBBFAC,,, | Performed by: NURSE PRACTITIONER

## 2023-03-21 PROCEDURE — 93010 ELECTROCARDIOGRAM REPORT: CPT | Mod: S$PBB,,, | Performed by: INTERNAL MEDICINE

## 2023-03-21 PROCEDURE — 93005 ELECTROCARDIOGRAM TRACING: CPT | Mod: PBBFAC,59 | Performed by: INTERNAL MEDICINE

## 2023-03-21 PROCEDURE — 99214 PR OFFICE/OUTPT VISIT, EST, LEVL IV, 30-39 MIN: ICD-10-PCS | Mod: S$PBB,,, | Performed by: NURSE PRACTITIONER

## 2023-03-21 PROCEDURE — 99214 OFFICE O/P EST MOD 30 MIN: CPT | Mod: PBBFAC | Performed by: NURSE PRACTITIONER

## 2023-03-21 PROCEDURE — 93284 PRGRMG EVAL IMPLANTABLE DFB: CPT

## 2023-03-21 PROCEDURE — 93284 CARDIAC DEVICE CHECK - IN CLINIC & HOSPITAL: ICD-10-PCS | Mod: 26,,, | Performed by: INTERNAL MEDICINE

## 2023-03-21 PROCEDURE — 99999 PR PBB SHADOW E&M-EST. PATIENT-LVL IV: CPT | Mod: PBBFAC,,, | Performed by: NURSE PRACTITIONER

## 2023-03-21 PROCEDURE — 93284 PRGRMG EVAL IMPLANTABLE DFB: CPT | Mod: 26,,, | Performed by: INTERNAL MEDICINE

## 2023-03-21 PROCEDURE — 93010 RHYTHM STRIP: ICD-10-PCS | Mod: S$PBB,,, | Performed by: INTERNAL MEDICINE

## 2023-03-21 PROCEDURE — 99214 OFFICE O/P EST MOD 30 MIN: CPT | Mod: S$PBB,,, | Performed by: NURSE PRACTITIONER

## 2023-03-21 RX ORDER — CARVEDILOL 25 MG/1
37.5 TABLET ORAL 2 TIMES DAILY WITH MEALS
Qty: 90 TABLET | Refills: 11 | Status: SHIPPED | OUTPATIENT
Start: 2023-03-21 | End: 2023-10-18 | Stop reason: SDUPTHER

## 2023-03-22 ENCOUNTER — HOSPITAL ENCOUNTER (OUTPATIENT)
Dept: CARDIOLOGY | Facility: HOSPITAL | Age: 58
Discharge: HOME | End: 2023-03-22
Attending: NURSE PRACTITIONER
Payer: MEDICARE

## 2023-03-22 VITALS
HEART RATE: 74 BPM | WEIGHT: 293 LBS | DIASTOLIC BLOOD PRESSURE: 76 MMHG | BODY MASS INDEX: 50.02 KG/M2 | SYSTOLIC BLOOD PRESSURE: 138 MMHG | HEIGHT: 64 IN

## 2023-03-22 DIAGNOSIS — I48.0 PAROXYSMAL ATRIAL FIBRILLATION: ICD-10-CM

## 2023-03-22 DIAGNOSIS — Z95.810 BIVENTRICULAR AUTOMATIC IMPLANTABLE CARDIOVERTER DEFIBRILLATOR IN SITU: ICD-10-CM

## 2023-03-22 LAB
ASCENDING AORTA: 3.65 CM
AV INDEX (PROSTH): 0.73
AV MEAN GRADIENT: 4 MMHG
AV PEAK GRADIENT: 9 MMHG
AV VALVE AREA: 3.1 CM2
AV VELOCITY RATIO: 0.72
BSA FOR ECHO PROCEDURE: 2.54 M2
CV ECHO LV RWT: 0.32 CM
DOP CALC AO PEAK VEL: 1.49 M/S
DOP CALC AO VTI: 27.02 CM
DOP CALC LVOT AREA: 4.2 CM2
DOP CALC LVOT DIAMETER: 2.32 CM
DOP CALC LVOT PEAK VEL: 1.07 M/S
DOP CALC LVOT STROKE VOLUME: 83.79 CM3
DOP CALCLVOT PEAK VEL VTI: 19.83 CM
E WAVE DECELERATION TIME: 231.32 MSEC
E/A RATIO: 0.82
E/E' RATIO: 11.67 M/S
ECHO LV POSTERIOR WALL: 0.85 CM (ref 0.6–1.1)
EJECTION FRACTION: 50 %
FRACTIONAL SHORTENING: 27 % (ref 28–44)
INTERVENTRICULAR SEPTUM: 0.85 CM (ref 0.6–1.1)
IVRT: 114.18 MSEC
LA MAJOR: 6.27 CM
LA MINOR: 5.98 CM
LA WIDTH: 5.42 CM
LEFT ATRIUM SIZE: 4.18 CM
LEFT ATRIUM VOLUME INDEX MOD: 46.6 ML/M2
LEFT ATRIUM VOLUME INDEX: 49.7 ML/M2
LEFT ATRIUM VOLUME MOD: 110.51 CM3
LEFT ATRIUM VOLUME: 117.88 CM3
LEFT INTERNAL DIMENSION IN SYSTOLE: 3.86 CM (ref 2.1–4)
LEFT VENTRICLE DIASTOLIC VOLUME INDEX: 56.05 ML/M2
LEFT VENTRICLE DIASTOLIC VOLUME: 132.83 ML
LEFT VENTRICLE MASS INDEX: 68 G/M2
LEFT VENTRICLE SYSTOLIC VOLUME INDEX: 27.2 ML/M2
LEFT VENTRICLE SYSTOLIC VOLUME: 64.38 ML
LEFT VENTRICULAR INTERNAL DIMENSION IN DIASTOLE: 5.26 CM (ref 3.5–6)
LEFT VENTRICULAR MASS: 160.03 G
LV LATERAL E/E' RATIO: 11.67 M/S
LV SEPTAL E/E' RATIO: 11.67 M/S
MV A" WAVE DURATION": 9.71 MSEC
MV PEAK A VEL: 0.85 M/S
MV PEAK E VEL: 0.7 M/S
MV STENOSIS PRESSURE HALF TIME: 67.08 MS
MV VALVE AREA P 1/2 METHOD: 3.28 CM2
PISA TR MAX VEL: 2.53 M/S
PULM VEIN S/D RATIO: 1.65
PV PEAK D VEL: 0.31 M/S
PV PEAK S VEL: 0.51 M/S
RA MAJOR: 4.84 CM
RA PRESSURE: 3 MMHG
RA WIDTH: 3.73 CM
RIGHT VENTRICULAR END-DIASTOLIC DIMENSION: 3.89 CM
RV TISSUE DOPPLER FREE WALL SYSTOLIC VELOCITY 1 (APICAL 4 CHAMBER VIEW): 10.89 CM/S
SINUS: 3.4 CM
STJ: 3.12 CM
TDI LATERAL: 0.06 M/S
TDI SEPTAL: 0.06 M/S
TDI: 0.06 M/S
TR MAX PG: 26 MMHG
TRICUSPID ANNULAR PLANE SYSTOLIC EXCURSION: 2.18 CM
TV REST PULMONARY ARTERY PRESSURE: 29 MMHG

## 2023-03-22 PROCEDURE — 93306 TTE W/DOPPLER COMPLETE: CPT

## 2023-03-22 PROCEDURE — 93306 ECHO (CUPID ONLY): ICD-10-PCS | Mod: 26,,, | Performed by: INTERNAL MEDICINE

## 2023-03-22 PROCEDURE — 93306 TTE W/DOPPLER COMPLETE: CPT | Mod: 26,,, | Performed by: INTERNAL MEDICINE

## 2023-03-24 ENCOUNTER — TELEPHONE (OUTPATIENT)
Dept: REHABILITATION | Facility: HOSPITAL | Age: 58
End: 2023-03-24
Payer: MEDICARE

## 2023-03-24 NOTE — TELEPHONE ENCOUNTER
PT called pt to check on her after she cancelled her 8:30 AM physical therapy appointment today. Pt reports she has had many appointments lately, and the transportation service has been picking her up very early. Pt states she was too tired to come to therapy today, but plans to be at her next appointment on 3/31/23.    Toby Ambrosio, PT  3/24/2023

## 2023-03-31 ENCOUNTER — EXTERNAL CHRONIC CARE MANAGEMENT (OUTPATIENT)
Dept: PRIMARY CARE CLINIC | Facility: CLINIC | Age: 58
End: 2023-03-31
Payer: MEDICARE

## 2023-03-31 ENCOUNTER — TELEPHONE (OUTPATIENT)
Dept: ORTHOPEDICS | Facility: CLINIC | Age: 58
End: 2023-03-31
Payer: MEDICARE

## 2023-03-31 ENCOUNTER — PATIENT MESSAGE (OUTPATIENT)
Dept: ORTHOPEDICS | Facility: CLINIC | Age: 58
End: 2023-03-31
Payer: MEDICARE

## 2023-03-31 DIAGNOSIS — M51.36 DDD (DEGENERATIVE DISC DISEASE), LUMBAR: Primary | ICD-10-CM

## 2023-03-31 PROCEDURE — 99490 PR CHRONIC CARE MGMT, 1ST 20 MIN: ICD-10-PCS | Mod: S$PBB,,, | Performed by: INTERNAL MEDICINE

## 2023-03-31 PROCEDURE — 99490 CHRNC CARE MGMT STAFF 1ST 20: CPT | Mod: PBBFAC | Performed by: INTERNAL MEDICINE

## 2023-03-31 PROCEDURE — 99490 CHRNC CARE MGMT STAFF 1ST 20: CPT | Mod: S$PBB,,, | Performed by: INTERNAL MEDICINE

## 2023-04-04 ENCOUNTER — DOCUMENTATION ONLY (OUTPATIENT)
Dept: REHABILITATION | Facility: HOSPITAL | Age: 58
End: 2023-04-04
Payer: MEDICARE

## 2023-04-04 ENCOUNTER — HOSPITAL ENCOUNTER (OUTPATIENT)
Dept: RADIOLOGY | Facility: HOSPITAL | Age: 58
Discharge: HOME | End: 2023-04-04
Attending: PHYSICIAN ASSISTANT
Payer: MEDICARE

## 2023-04-04 DIAGNOSIS — M51.36 DDD (DEGENERATIVE DISC DISEASE), LUMBAR: ICD-10-CM

## 2023-04-04 PROCEDURE — 72110 X-RAY EXAM L-2 SPINE 4/>VWS: CPT | Mod: 26,,, | Performed by: RADIOLOGY

## 2023-04-04 PROCEDURE — 72110 XR LUMBAR SPINE AP AND LAT WITH FLEX/EXT: ICD-10-PCS | Mod: 26,,, | Performed by: RADIOLOGY

## 2023-04-04 PROCEDURE — 72110 X-RAY EXAM L-2 SPINE 4/>VWS: CPT | Mod: TC

## 2023-04-04 NOTE — PROGRESS NOTES
PT/PTA met face to face to discuss pt's treatment plan and progress towards established goals.  Continue with current PT POC with focus on balance and endurance.  Patient will be seen by physical therapist at least every sixth treatment or 30 days, whichever occurs first.    Julissa Fine, PTA  04/04/2023

## 2023-04-04 NOTE — PROGRESS NOTES
Physical Therapy Daily Treatment Note     Name: Amna Chawla  [facility] Number: [id number]    Therapy Diagnosis:   Encounter Diagnoses   Name Primary?    Impaired functional mobility, balance, gait, and endurance Yes    Left leg weakness        Physician: Hugo Borden MD    Visit Date: 4/6/2023    Physician Orders: PT Eval and Treat   Medical Diagnosis from Referral:   R26.81 (ICD-10-CM) - Gait instability   I69.354 (ICD-10-CM) - Hemiparesis affecting left side as late effect of stroke   M54.41,M54.42,G89.29 (ICD-10-CM) - Chronic midline low back pain with bilateral sciatica      Evaluation Date: 3/7/2023  Authorization Period Expiration: 12/29/2023  Plan of Care Expiration: 05/02/2023  Progress note due: 04/07/2023  Visit # / Visits authorized: 3/ 20(+evaluation)    PTA visit: 0/5    Time In: 0913   Time Out: 0958   Total Billable Time: 45  minutes    Precautions: Standard and blood thinners    Subjective     Pt reports: She saw an MD yesterday for her back pain. She had some X-rays taken the day before. Orthopedic surgeon placed new orders for pt's complaints of back pain, but she has not been contacted by central scheduling yet to set up her evaluation. Pt denies any back pain today.    She was compliant with her home exercise program  Response to previous treatment: no adverse reactions   Functional change: none    Pain: 0/10  Location: N/A    Objective     Amna received therapeutic exercises to develop strength, endurance, ROM, flexibility, posture, and core stabilization for 20 minutes including:    X 8 minutes on SCI-FIT recumbent stepper, level 3.0, for B UE and LE muscular and CV endurance  SpO2 97 % ad HR 82    Sitting on high/low mat:   1 x 10 reps of sit to stand, S   1 x 10 reps of sit to stand with feet resting on foam fitter, CGA     Ascend and descend 12 six inch steps with B rails, reciprocal pattern, S         Amna participated in neuromuscular re-education activities to improve: Balance,  "Coordination, Kinesthetic, Sense, Proprioception, and Posture for 25 minutes. The following activities were included:    Inside parallel bars:    1 x 10 B LE forward/backward step ups/downs on foam fitter, no to intermittent light 1 UE support, CGA  2 x 30" static standing on foam fitter, narrow base of support, eyes closed, no UE support, CGA  2 x 10 B LE alternate toe taps against orange cone, standing on foam fitter,  no UE support, CGA    On wooden rocker board:  3 x 30" working board in A/P directions, no to intermittent light 1 UE support, CGA    1 x 30" B alternate split stance with lead foot on soft side of small Bosu, no UE support, CGA  1 x 30" B alternate split stance with lead foot on flat side of small Bosu, no UE support, CGA  1 x 30" static standing on flat side of small Bosu, no UE support, CGA  3 x 30" static standing on flat side of small Bosu, eyes closed, no UE support, min A to occasional light mod A    1 x 10 B LE forward/backward step overs using 5 " everett, no UE support, CGA     Amna participated in dynamic functional therapeutic activities to improve functional performance for 0  minutes, including:      Amna participated in gait training to improve functional mobility and safety for 0  minutes, including:        Home Exercises Provided and Patient Education Provided     Education provided:   - Pt educated regarding evaluation findings, potential plan of care and scheduling process.   - first installment of HEP provided 3/9/2023    Written Home Exercises Provided: yes.  Exercises were reviewed and Amna was able to demonstrate them prior to the end of the session.  Amna demonstrated good  understanding of the education provided.     See EMR under Patient Instructions for exercises provided 3/9/2023.    Assessment     Ms. Woo tolerated her visit well with no reports of back pain at any time during today's session. PT had pt continue her work on the SCI-FIT recumbent stepper at level 3.0 " and progressed sit<> stand trials with use of foam fitter. Pt also demonstrated nice ability to perform reciprocal stair negotiation. PT used the remaining time to work on various balance activities inside the parallel bars. PT included several activities to address SLS on L LE. She also did well with initiation of rocker board training and eyes closed balance conditions. Overall, pt remains a good candidate for skilled physical therapy addressing gait and balance deficits. Will plan to reassess pt status at her next session.    Amna Is progressing well towards her goals.   Pt prognosis is Good.     Pt will continue to benefit from skilled outpatient physical therapy to address the deficits listed in the problem list box on initial evaluation, provide pt/family education and to maximize pt's level of independence in the home and community environment.     Pt's spiritual, cultural and educational needs considered and pt agreeable to plan of care and goals.     Anticipated barriers to physical therapy:     Goals:  Short Term Goals: 8 weeks   Pt will be at least partially independent with initial installment of HEP. Ongoing  Pt will improve her self- selected walking speed to 0.86 m/sec, demonstrating increased community ambulation.Ongoing  Pt will improve her FGA score to 13/30, demonstrating increased safety with dynamic gait activities.Ongoing  Pt will improve her 5 times sit to stand score to 14 seconds, indicating increased functional LE strength. Ongoing     Long Term Goals: 8 weeks   Pt will report </= 41% limitation using FOTO assessment tool in order to demonstrate improved perception of abilities. Ongoing  Pt will improve her self- selected walking speed to 1.0 m/sec , demonstrating increased community ambulation.Ongoing  Pt will improve her fast- walking speed score to 1.2 m/sec, demonstrating increased safety with changes in gait speed during community ambulation. Ongoing  Pt will improve her FGA score to  15/30, demonstrating increased safety with dynamic gait activities. Ongoing  Pt will improve her 5 times sit to stand score to 12 seconds, indicating increased functional LE strength. Ongoing  Pt will be independent with advanced HEP to help maintain potential gains realized in PT. Ongoing  Pt will ascend and descend 12 steps with use of rails while exhibiting reciprocal pattern in each direction. Ongoing    Plan     Next visit: perform 30 day reassessment.      Progress gait and balance challenges.       Toby Ambrosio, PT    4/6/2023

## 2023-04-05 ENCOUNTER — OFFICE VISIT (OUTPATIENT)
Dept: ORTHOPEDICS | Facility: CLINIC | Age: 58
End: 2023-04-05
Payer: MEDICARE

## 2023-04-05 VITALS — HEIGHT: 64 IN | BODY MASS INDEX: 50.02 KG/M2 | WEIGHT: 293 LBS

## 2023-04-05 DIAGNOSIS — M43.16 SPONDYLOLISTHESIS OF LUMBAR REGION: Primary | ICD-10-CM

## 2023-04-05 PROCEDURE — 99999 PR PBB SHADOW E&M-EST. PATIENT-LVL IV: CPT | Mod: PBBFAC,,, | Performed by: PHYSICIAN ASSISTANT

## 2023-04-05 PROCEDURE — 99214 PR OFFICE/OUTPT VISIT, EST, LEVL IV, 30-39 MIN: ICD-10-PCS | Mod: S$PBB,,, | Performed by: PHYSICIAN ASSISTANT

## 2023-04-05 PROCEDURE — 99214 OFFICE O/P EST MOD 30 MIN: CPT | Mod: PBBFAC | Performed by: PHYSICIAN ASSISTANT

## 2023-04-05 PROCEDURE — 99214 OFFICE O/P EST MOD 30 MIN: CPT | Mod: S$PBB,,, | Performed by: PHYSICIAN ASSISTANT

## 2023-04-05 PROCEDURE — 99999 PR PBB SHADOW E&M-EST. PATIENT-LVL IV: ICD-10-PCS | Mod: PBBFAC,,, | Performed by: PHYSICIAN ASSISTANT

## 2023-04-05 NOTE — PROGRESS NOTES
"DATE: 4/5/2023  PATIENT: Amna Chawla    Attending Physician: Avelino Lea M.D.    HISTORY:  Amna Chawla is a 57 y.o. female who returns to me today for evaluation of back pain.  She was last seen by me 5/9/2017.  Today she is doing well but notes over the last 3 months she has had increased back pain.  She says this is different than her usual sciatica pain.  The pain is 8/10.  She describes it as aching.  There is numbness and tingling in the left leg, this is residual after a stroke.  There is no subjective weakness. Her pain is worse with sitting and lying down and improved by walking.  She has tried zanaflex and is in PT for her left leg.  She cannot take NSAIDs.  She cannot have an MRI due to pacemaker.    The Patient denies myelopathic symptoms such as handwriting changes or difficulty with buttons/coins/keys. Denies perineal paresthesias, bowel/bladder dysfunction.    PMH/PSH/FamHx/SocHx:  Unchanged from prior visit        EXAM:  Ht 5' 4.02" (1.626 m)   Wt (!) 142.4 kg (313 lb 15 oz)   LMP 01/03/2016   BMI 53.86 kg/m²     My physical examination was notable for the following findings:     Normal station and gait, no difficulty with toe or heel walk.   Dorsal lumbar skin negative for rashes, lesions, hairy patches and surgical scars. There is minimal lumbar tenderness to palpation.  Lumbar range of motion is acceptable.  Global saggital and coronal spinal balance acceptable, not significant for scoliosis and kyphosis.  No pain with the range of motion of the bilateral hips. No trochanteric tenderness to palpation.  Bilateral lower extremities warm and well perfused, dorsalis pedis pulses 2+ bilaterally.  Normal strength and tone in all major motor groups in the bilateral lower extremities. Normal sensation to light touch in the L2-S1 dermatomes bilaterally.  Deep tendon reflexes symmetric 2+ in the bilateral lower extremities.  Negative Babinski bilaterally. Straight leg raise negative " bilaterally.      IMAGING:    Today I personally re- reviewed AP, Lat and Flex/Ex  upright L-spine that demonstrate grade I anterolisthesis at L4/5.       Body mass index is 53.86 kg/m².    Hemoglobin A1C   Date Value Ref Range Status   10/19/2022 6.1 (H) 4.0 - 5.6 % Final     Comment:     ADA Screening Guidelines:  5.7-6.4%  Consistent with prediabetes  >or=6.5%  Consistent with diabetes    High levels of fetal hemoglobin interfere with the HbA1C  assay. Heterozygous hemoglobin variants (HbS, HgC, etc)do  not significantly interfere with this assay.   However, presence of multiple variants may affect accuracy.     04/12/2022 6.4 (H) 4.0 - 5.6 % Final     Comment:     ADA Screening Guidelines:  5.7-6.4%  Consistent with prediabetes  >or=6.5%  Consistent with diabetes    High levels of fetal hemoglobin interfere with the HbA1C  assay. Heterozygous hemoglobin variants (HbS, HgC, etc)do  not significantly interfere with this assay.   However, presence of multiple variants may affect accuracy.     10/05/2021 6.2 (H) 4.0 - 5.6 % Final     Comment:     ADA Screening Guidelines:  5.7-6.4%  Consistent with prediabetes  >or=6.5%  Consistent with diabetes    High levels of fetal hemoglobin interfere with the HbA1C  assay. Heterozygous hemoglobin variants (HbS, HgC, etc)do  not significantly interfere with this assay.   However, presence of multiple variants may affect accuracy.           ASSESSMENT/PLAN:    Amna was seen today for pain.    Diagnoses and all orders for this visit:    Spondylolisthesis of lumbar region  -     Ambulatory referral/consult to Physical/Occupational Therapy; Future        Today we discussed at length all of the different treatment options including anti-inflammatories, acetaminophen, rest, ice, heat, physical therapy including strengthening and stretching exercises, home exercises, ROM, aerobic conditioning, aqua therapy, other modalities including ultrasound, massage, and dry needling, epidural  steroid injections and finally surgical intervention.      She is not interested in injections or surgical intervention at this time so will hold off on CT myelogram.  New orders for PT placed today.  She will continue zanaflex as needed.  Follow up as needed.

## 2023-04-06 ENCOUNTER — CLINICAL SUPPORT (OUTPATIENT)
Dept: REHABILITATION | Facility: HOSPITAL | Age: 58
End: 2023-04-06
Payer: MEDICARE

## 2023-04-06 DIAGNOSIS — R29.898 LEFT LEG WEAKNESS: ICD-10-CM

## 2023-04-06 DIAGNOSIS — Z74.09 IMPAIRED FUNCTIONAL MOBILITY, BALANCE, GAIT, AND ENDURANCE: Primary | Chronic | ICD-10-CM

## 2023-04-06 PROCEDURE — 97110 THERAPEUTIC EXERCISES: CPT | Mod: PO

## 2023-04-06 PROCEDURE — 97112 NEUROMUSCULAR REEDUCATION: CPT | Mod: PO

## 2023-04-07 ENCOUNTER — CLINICAL SUPPORT (OUTPATIENT)
Dept: CARDIOLOGY | Facility: HOSPITAL | Age: 58
End: 2023-04-07
Payer: MEDICARE

## 2023-04-07 DIAGNOSIS — Z95.810 PRESENCE OF AUTOMATIC (IMPLANTABLE) CARDIAC DEFIBRILLATOR: ICD-10-CM

## 2023-04-07 PROCEDURE — 93295 CARDIAC DEVICE CHECK - REMOTE: ICD-10-PCS | Mod: ,,, | Performed by: INTERNAL MEDICINE

## 2023-04-07 PROCEDURE — 93295 DEV INTERROG REMOTE 1/2/MLT: CPT | Mod: ,,, | Performed by: INTERNAL MEDICINE

## 2023-04-10 ENCOUNTER — DOCUMENTATION ONLY (OUTPATIENT)
Dept: REHABILITATION | Facility: HOSPITAL | Age: 58
End: 2023-04-10
Payer: MEDICARE

## 2023-04-10 NOTE — PROGRESS NOTES
Digital Medicine: Health  Follow-Up    Pt's BP was elevated yesterday. She was sitting leaning forward. She plans to check again today while resting her back on the chair.   Plans to start checking more often. Discussed timing readings after medications.     No questions or concerns at this time. Encouraged her to call if any arise.      The history is provided by the patient.   Follow Up  Follow-up reason(s): reading review      Readings are trending up due to inaccurate technique.        INTERVENTION(S)  reviewed monitoring technique and encouragement/support    PLAN  patient verbalizes understanding, patient amenable to changes and additional monitoring needed          Topic    Lipid (Cholesterol) Test        Last 5 Patient Entered Readings                                      Current 30 Day Average: 135/83     Recent Readings 6/9/2020 6/9/2020 6/1/2020 6/1/2020 5/31/2020    SBP (mmHg) 169 169 122 122 127    DBP (mmHg) 92 92 74 74 79    Pulse 65 65 70 70 72                      Diet Screening       Reports that she has cut back on soft drinks. Encouraged this and swapping for water.     Intervention(s): limiting drinks that contain sugar    Physical Activity Screening   When asked if exercising, patient responded: no    She does not formally exercise but reports moving often during the day, doing tasks around the house.      SDOH   N/A

## 2023-04-12 NOTE — PROGRESS NOTES
Physical Therapy Daily Treatment Note     Name: Amna Chawla  [facility] Number: [id number]    Therapy Diagnosis:   Encounter Diagnoses   Name Primary?    Impaired functional mobility, balance, gait, and endurance Yes    Left leg weakness          Physician: Hugo Borden MD    Visit Date: 4/13/2023    Physician Orders: PT Eval and Treat   Medical Diagnosis from Referral:   R26.81 (ICD-10-CM) - Gait instability   I69.354 (ICD-10-CM) - Hemiparesis affecting left side as late effect of stroke   M54.41,M54.42,G89.29 (ICD-10-CM) - Chronic midline low back pain with bilateral sciatica      Evaluation Date: 3/7/2023  Authorization Period Expiration: 12/29/2023  Plan of Care Expiration: 05/02/2023  Progress note due: 04/07/2023  Visit # / Visits authorized: 4/ 20(+evaluation)    PTA visit: 0/5    Time In: 0845    Time Out: 0940    Total Billable Time: 55   minutes    Precautions: Standard and blood thinners    Subjective     Pt reports: she feel good this morning and denies back pain.    She was compliant with her home exercise program  Response to previous treatment: no adverse reactions   Functional change: none    Pain: 0/10  Location: N/A    Objective     Lower Extremity Strength (tested in sitting)  Right LE Eval 4/13/23 Left LE Eval 4/13/23   Hip flexion:  5/5 5/5 Hip flexion: 4-/5 4+/5   Knee extension: 5/5 5/5 Knee extension: 4/5 4+/5   Knee flexion: 5/5 5/5 Knee flexion: 5/5 5/5   Ankle dorsiflexion:  5/5 5/5 Ankle dorsiflexion: 5/5 5/5   Ankle plantarflexion:  5/5 5/5 Ankle plantarflexion: 4+/5 5/5   Hip abduction: 5/5 5/5 Hip abduction: 5/5 5/5   Hip adduction: 5/5 5/5 Hip adduction 4/5 5/5   Hip extension: 5/5 5/5 Hip extension: 4/5 4+/5     Performed using wide blue chair:     Evaluation 4/13/23   Single Limb Stance R LE NT  (<10 sec = HIGH FALL RISK) NT  (<10 sec = HIGH FALL RISK)   Single Limb Stance L LE NT  (<10 sec = HIGH FALL RISK) NT  (<10 sec = HIGH FALL RISK)   5 times sit to stand 16 seconds,  hands on thighs, performed on blue bench 13 seconds, hands on thighs, performed on gold chair   TUG 12 seconds 9 seconds   Self selected walking speed 0.75 m/sec (6m/8s) 0.86 m/sec (6m/7s)   Fast walking speed 1.0 m/sec (6m/6s)    1.2 m/sec (6m/5s)         5x sit to stand normative information:   >12 sec= fall risk for general elderly  >16 sec= fall risk for Parkinson's disease  >10 sec= balance/vestibular dysfunction (<61 y/o)  >14.2 sec= balance/vestibular dysfunction (>61 y/o)  >12 sec= fall risk for CVA     Functional Gait Assessment:   1. Gait on level surface =  2              (3) Normal: less than 5.5 sec, no A.D., no imbalance, normal gait pattern, deviates< 6in              (2) Mild impairment: 7-5.6 sec, uses A.D., mild gait deviations, or deviates 6-10 in              (1) Moderate impairment: > 7 sec, slow speed, imbalance, deviates 10-15 in.              (0) Severe impairment: needs assist, deviates >15 in, reach/touch wall  2. Change in Gait Speed = 2              (3) Normal: smooth change w/o loss of balance or gait deviation, deviates < 6 in, significant difference between speeds              (2) Mild impairment: changes speed, but demonstrates mild gait deviations, deviates 6-10 in, OR no deviations but unable to significantly speed, OR uses A.D.              (1) Moderate impairment: minor changes to speed, OR changes speed w/ significant deviations, deviates 10-15 in, OR  Changes speed , but loses balance & recovers              (0) Severe impairment: cannot change speed, deviates >15 in, or loses balance & needs assist  3. Gait with horizontal head turns  = 2              (3) Normal: no change in gait, deviates <6 in              (2) Mild impairment: slight change in speed, deviates 6-10 in, OR uses A.D.              (1) Moderate impairment: moderate change in speed, deviates 10-15 in              (0) Severe impairment: severe disruption of gait, deviates >15in  4. Gait with vertical head turns =  2              (3) Normal: no change in gait, deviates <6 in              (2) Mild impairment: slight change in speed, deviates 6-10 in OR uses A.D.              (1) Moderate impairment: moderate change in speed, deviates 10-15 in              (0) Severe impairment: severe disruption of gait, deviates >15 in  5. Gait with pivot turns = 3              (3) Normal: performs safely in 3 sec, no LOB              (2) Mild impairment: performs in >3 sec & no LOB, OR turns safely & requires several steps to regain LOB              (1) Moderate impairment: turns slow, OR requires several small steps for balance following turn & stop              (0) Severe impairment: cannot turn safely, needs assist  6. Step over obstacle = 3              (3) Normal: steps over 2 stacked boxes w/o change in speed or LOB              (2) Mild impairment: able to step over 1 box w/o change in speed or LOB              (1) Moderate impairment: steps over 1 box but must slow down, may require VC              (0) Severe impairment: cannot perform w/o assist  7. Gait with Narrow GAGE = 2              (3) Normal: 10 steps no staggering              (2) Mild impairment: 7-9 steps              (1) Moderate impairment: 4-7 steps              (0) Severe impairment: < 4 steps or cannot perform w/o assist  8. Gait with eyes closed = 2              (3) Normal: < 7 sec, no A.D., no LOB, normal gait pattern, deviates <6 in              (2) Mild impairment: 7.1-9 sec, mild gait deviations, deviates 6-10 in              (1) Moderate impairment: > 9 sec, abnormal pattern, LOB, deviates 10-15 in              (0) Severe impairment: cannot perform w/o assist, LOB, deviates >15in  9. Ambulating Backwards = 2              (3) Normal: no A.D., no LOB, normal gait pattern, deviates <6in              (2) Mild impairment: uses A.D., slower speed, mild gait deviations, deviates 6-10 in              (1) Moderate impairment: slow speed, abnormal gait pattern, LOB,  "deviates 10-15 in              (0) Severe impairment: severe gait deviations or LOB, deviates >15in  10. Steps = 2 (step through with use of rail)              (3) Normal: alternating feet, no rail              (2) Mild Impairment: alternating feet, uses rail              (1) Moderate impairment: step-to, uses rail              (0) Severe impairment: cannot perform safely     Score 11/30 at time of evaluation; 22/30 on 4/13/23      Score:   <22/30 fall risk   <20/30 fall risk in older adults   <18/30 fall risk in Parkinsons     CMS Impairment/Limitation/Restriction for FOTO Stroke Lower Extremity Survey  Status Limitation G-Code CMS Severity Modifier  Intake 55% 45%  Predicted 59% 41% Goal Status+ CK - At least 40 percent but less than 60 percent  4/13/2023 63% 37% Current Status CJ - At least 20 percent but less than 40 percent  D/C Status CJ **only report if this is discharge survey       Amna received therapeutic exercises to develop strength, endurance, ROM, flexibility, posture, and core stabilization for 17 minutes including:  (Includes above tests and measures)    X 8 minutes on SCI-FIT recumbent stepper, level 3.0, for B UE and LE muscular and CV endurance  SpO2 97 % ad HR 78          Amna participated in neuromuscular re-education activities to improve: Balance, Coordination, Kinesthetic, Sense, Proprioception, and Posture for 38 minutes. The following activities were included:  (Includes above tests and measures)      Inside parallel bars:    1 x 10 B LE forward/backward step ups/downs on foam fitter, no  UE support, CGA  1 x 10 alternate single leg step overs on foam fitter, no UE support, CGA  1 x 10 B LE split stance toe taps against orange cone,  no UE support, CGA to slight min A    On wooden rocker board:  3 x 30" working board in A/P directions, no UE support, CGA      1 x 30" static standing on flat side of small Bosu, no UE support, SBA  2 x 30" static standing on flat side of small Bosu, eyes " "closed, no UE support, min A trial 1, min to mod A trial 2    X 4 laps of reciprocal stepping over five, 5 " hurdles, no UE support, CGA  X 4 laps side stepping over  five, 5 " hurdles, no UE support, CGA     Amna participated in dynamic functional therapeutic activities to improve functional performance for 0  minutes, including:      Amna participated in gait training to improve functional mobility and safety for 0  minutes, including:        Home Exercises Provided and Patient Education Provided     Education provided:   - Pt educated regarding evaluation findings, potential plan of care and scheduling process.   - first installment of HEP provided 3/9/2023    Written Home Exercises Provided: yes.  Exercises were reviewed and Amna was able to demonstrate them prior to the end of the session.  Amna demonstrated good  understanding of the education provided.     See EMR under Patient Instructions for exercises provided 3/9/2023.    Assessment     Amna tolerated her visit very well today for 30 day reassessment. This comes a bit late due to several missed visits secondary to transportation difficulty. Pt demonstrated improvement in all areas tested. First, her L LE strength grades increased for hip flexion/extension, hip adduction, knee extension and ankle plantarflexion. She improved her 5 x sit<> stand and TUG scores by 3 seconds and drastically improved her FGA score to 22/30( pt scored only 11/30 at evaluation)! Her SSWS and FSWS also increased from values registered at evaluation. PT is very pleased with pt's progress and expects continued improvement with additional therapy visits. Pt's back pain has not limited her lately, which has allowed PT to ramp up balance challenges. Overall, pt remains a good candidate for skilled physical therapy addressing gait and balance deficits.    Amna Is progressing well towards her goals.   Pt prognosis is Good.     Pt will continue to benefit from skilled outpatient " physical therapy to address the deficits listed in the problem list box on initial evaluation, provide pt/family education and to maximize pt's level of independence in the home and community environment.     Pt's spiritual, cultural and educational needs considered and pt agreeable to plan of care and goals.     Anticipated barriers to physical therapy:     Goals:  Short Term Goals: 8 weeks   Pt will be at least partially independent with initial installment of HEP ~ MET, 4/13/23  Pt will improve her self- selected walking speed to 0.86 m/sec, demonstrating increased community ambulation~MET, 4/13/23  Pt will improve her FGA score to 13/30, demonstrating increased safety with dynamic gait activities~MET, 4/13/23  Pt will improve her 5 times sit to stand score to 14 seconds, indicating increased functional LE strength~ MET, 4/13/23     Long Term Goals: 8 weeks   Pt will report </= 41% limitation using FOTO assessment tool in order to demonstrate improved perception of abilities~MET, 4/13/23  Pt will improve her self- selected walking speed to 1.0 m/sec , demonstrating increased community ambulation.Ongoing  Pt will improve her fast- walking speed score to 1.2 m/sec, demonstrating increased safety with changes in gait speed during community ambulation~MET, 4/13/23  Pt will improve her FGA score to 15/30, demonstrating increased safety with dynamic gait activities~MET, 4/13/23~ revise this goal to 24/30  Pt will improve her 5 times sit to stand score to 12 seconds, indicating increased functional LE strength~progressing, 4/13/23  Pt will be independent with advanced HEP to help maintain potential gains realized in PT. Ongoing  Pt will ascend and descend 12 steps with use of rails while exhibiting reciprocal pattern in each direction~MET, 4/13/23~ revise to no use of rails    Plan     Progress gait and balance challenges.     Toby Ambrosio, PT    4/13/2023

## 2023-04-13 ENCOUNTER — CLINICAL SUPPORT (OUTPATIENT)
Dept: REHABILITATION | Facility: HOSPITAL | Age: 58
End: 2023-04-13
Payer: MEDICARE

## 2023-04-13 DIAGNOSIS — R29.898 LEFT LEG WEAKNESS: ICD-10-CM

## 2023-04-13 DIAGNOSIS — Z74.09 IMPAIRED FUNCTIONAL MOBILITY, BALANCE, GAIT, AND ENDURANCE: Primary | Chronic | ICD-10-CM

## 2023-04-13 PROCEDURE — 97112 NEUROMUSCULAR REEDUCATION: CPT | Mod: PO

## 2023-04-13 PROCEDURE — 97110 THERAPEUTIC EXERCISES: CPT | Mod: PO

## 2023-04-19 ENCOUNTER — OFFICE VISIT (OUTPATIENT)
Dept: INTERNAL MEDICINE | Facility: CLINIC | Age: 58
End: 2023-04-19
Payer: MEDICARE

## 2023-04-19 VITALS
DIASTOLIC BLOOD PRESSURE: 82 MMHG | BODY MASS INDEX: 50.02 KG/M2 | HEART RATE: 86 BPM | OXYGEN SATURATION: 95 % | HEIGHT: 64 IN | WEIGHT: 293 LBS | SYSTOLIC BLOOD PRESSURE: 122 MMHG

## 2023-04-19 DIAGNOSIS — Z79.01 LONG TERM (CURRENT) USE OF ANTICOAGULANTS: ICD-10-CM

## 2023-04-19 DIAGNOSIS — R05.3 CHRONIC COUGH: Primary | ICD-10-CM

## 2023-04-19 DIAGNOSIS — E78.2 MIXED HYPERLIPIDEMIA: ICD-10-CM

## 2023-04-19 DIAGNOSIS — E55.9 VITAMIN D DEFICIENCY: ICD-10-CM

## 2023-04-19 DIAGNOSIS — Z86.718 HISTORY OF DVT (DEEP VEIN THROMBOSIS): ICD-10-CM

## 2023-04-19 DIAGNOSIS — K21.9 GASTROESOPHAGEAL REFLUX DISEASE WITHOUT ESOPHAGITIS: ICD-10-CM

## 2023-04-19 DIAGNOSIS — F06.70 MILD NEUROCOGNITIVE DISORDER DUE TO MULTIPLE ETIOLOGIES: ICD-10-CM

## 2023-04-19 DIAGNOSIS — I42.8 NONISCHEMIC CARDIOMYOPATHY: ICD-10-CM

## 2023-04-19 DIAGNOSIS — I10 ESSENTIAL HYPERTENSION: ICD-10-CM

## 2023-04-19 DIAGNOSIS — D51.9 ANEMIA DUE TO VITAMIN B12 DEFICIENCY, UNSPECIFIED B12 DEFICIENCY TYPE: ICD-10-CM

## 2023-04-19 DIAGNOSIS — R73.03 PREDIABETES: ICD-10-CM

## 2023-04-19 DIAGNOSIS — Z95.810 BIVENTRICULAR AUTOMATIC IMPLANTABLE CARDIOVERTER DEFIBRILLATOR IN SITU: ICD-10-CM

## 2023-04-19 DIAGNOSIS — I48.0 PAROXYSMAL ATRIAL FIBRILLATION: ICD-10-CM

## 2023-04-19 DIAGNOSIS — Z86.73 HISTORY OF CVA (CEREBROVASCULAR ACCIDENT): ICD-10-CM

## 2023-04-19 DIAGNOSIS — E66.01 CLASS 3 SEVERE OBESITY DUE TO EXCESS CALORIES WITH SERIOUS COMORBIDITY AND BODY MASS INDEX (BMI) OF 50.0 TO 59.9 IN ADULT: ICD-10-CM

## 2023-04-19 DIAGNOSIS — F41.9 ANXIETY: ICD-10-CM

## 2023-04-19 DIAGNOSIS — F33.1 DEPRESSION, MAJOR, RECURRENT, MODERATE: ICD-10-CM

## 2023-04-19 PROCEDURE — 99214 PR OFFICE/OUTPT VISIT, EST, LEVL IV, 30-39 MIN: ICD-10-PCS | Mod: S$PBB,,, | Performed by: INTERNAL MEDICINE

## 2023-04-19 PROCEDURE — 99214 OFFICE O/P EST MOD 30 MIN: CPT | Mod: S$PBB,,, | Performed by: INTERNAL MEDICINE

## 2023-04-19 PROCEDURE — 99215 OFFICE O/P EST HI 40 MIN: CPT | Mod: PBBFAC | Performed by: INTERNAL MEDICINE

## 2023-04-19 PROCEDURE — 99999 PR PBB SHADOW E&M-EST. PATIENT-LVL V: ICD-10-PCS | Mod: PBBFAC,,, | Performed by: INTERNAL MEDICINE

## 2023-04-19 PROCEDURE — 99999 PR PBB SHADOW E&M-EST. PATIENT-LVL V: CPT | Mod: PBBFAC,,, | Performed by: INTERNAL MEDICINE

## 2023-04-19 RX ORDER — CLOPIDOGREL BISULFATE 75 MG/1
75 TABLET ORAL DAILY
Qty: 90 TABLET | Refills: 3 | Status: SHIPPED | OUTPATIENT
Start: 2023-04-19 | End: 2023-07-20

## 2023-04-19 RX ORDER — ROSUVASTATIN CALCIUM 40 MG/1
40 TABLET, COATED ORAL NIGHTLY
Qty: 90 TABLET | Refills: 3 | Status: SHIPPED | OUTPATIENT
Start: 2023-04-19 | End: 2023-05-16

## 2023-04-19 RX ORDER — AMLODIPINE BESYLATE 5 MG/1
5 TABLET ORAL DAILY
Qty: 90 TABLET | Refills: 3 | Status: SHIPPED | OUTPATIENT
Start: 2023-04-19 | End: 2024-04-01 | Stop reason: SDUPTHER

## 2023-04-19 RX ORDER — LOSARTAN POTASSIUM 100 MG/1
100 TABLET ORAL DAILY
Qty: 90 TABLET | Refills: 3 | Status: SHIPPED | OUTPATIENT
Start: 2023-04-19 | End: 2024-04-01 | Stop reason: SDUPTHER

## 2023-04-19 RX ORDER — OMEPRAZOLE 40 MG/1
40 CAPSULE, DELAYED RELEASE ORAL DAILY
Qty: 90 CAPSULE | Refills: 6 | Status: SHIPPED | OUTPATIENT
Start: 2023-04-19 | End: 2023-12-08 | Stop reason: SDUPTHER

## 2023-04-19 NOTE — PROGRESS NOTES
INTERNAL MEDICINE ESTABLISHED PATIENT VISIT NOTE    Subjective:     Chief Complaint: Follow-up  HTN, preDM     Patient ID: Amna Chawla is a 57 y.o. female with hx CVA and TIA c residual cognitive deficits (most recently c TIA 9/2018 per pt, has mild B weakness from several strokes in the past), carotid a disease, HTN, paroxysmal A fib c LAE, hx sudden cardiac arrest c VF and no ischemic heart disease and preserved EF (2013), intermittent 3rd deg AV block and symptomatic LVEF of 40% in setting of normal PET stress and chronic RV pacing s/p CRT-D, HLD, thyroid nodule s/p FNA 7/2018 c path c/w benign follicular nodule, depression with anxiety followed by psych, chronic complex h/a c occipital neuralgia followed by Neuro and on mult meds and has also had tx c botox, GERD c hiatal hernia, B carpal tunnel s/p release B, hx L middle ring finger trigger finger s/p release, SHIRA on APAP 6-20cm followed in sleep clinic, insomnia, prediabetes, last seen by me in Oct, here today for 6 mo f/u HTN and preDM.    Still seeing Dr. Cortez for Botox injections for her migraines.    Had COVID in January tx'ed c Molnupiravir (potential drug interactions c Paxlovid).     Seen by Dr. Borden in Feb for issues c low back pain and told to cont Gabapentin and Tylenol and referred to PT for back/gait.  Was also seen by Charlene Rodriguez but was not interested in injections.    Had her routine Cardiology f/u last mo c Silvia Wang at which time noted PVC 4% so Carvedilol increased and repeat echo ordered due to c/o MERCHANT p having COVID in Jan c EF 50%, down from previous 58%.    States MERCHANT resolved but still c cough which is sometimes productive.  Also states she has been doing better c diet and down 5 lbs in the past month.    Past Medical History:  Past Medical History:   Diagnosis Date    Anticoagulant long-term use     Anxiety     Asthma     Atrial fibrillation     Brain anoxic injury     Cervicalgia 8/28/2014    CHI (closed head injury)  2/19/2013    Convulsion 5/30/2015    Decreased ROM of left shoulder 4/12/2017    Defibrillator activation 2013    Depression     Heart block     History of sudden cardiac arrest 2/2013    PEA arrest with subsequent long-QT    Hx of psychiatric care     Hypertension     Left atrial enlargement 4/11/2018    Pacemaker 2013    Paresthesia 11/1/2013    Prolonged Q-T interval on ECG 2/8/2013    Psychiatric problem     Seizures     Sleep difficulties     Stroke     weakness lt side    Therapy     Thyroid disease     Upper airway resistance syndrome 2/21/2017       Home Medications:  Prior to Admission medications    Medication Sig Start Date End Date Taking? Authorizing Provider   acetaminophen (TYLENOL) 500 MG tablet Take 1-2 tablets (500-1,000 mg total) by mouth 3 (three) times daily as needed for Pain. 2/2/23   Hugo Borden MD   amLODIPine (NORVASC) 5 MG tablet Take 1 tablet (5 mg total) by mouth once daily. 5/24/22 5/24/23  Alok Grossman MD   ARIPiprazole (ABILIFY) 15 MG Tab Take 1 tablet (15 mg total) by mouth once daily. 4/28/22   Kade Huffman III, NP   blood sugar diagnostic Strp 1 strip by Misc.(Non-Drug; Combo Route) route 2 (two) times daily. 5/20/19   Tiffany Mina MD   blood-glucose meter kit Please provide with insurance covered meter 5/20/19   Tiffany Mina MD   carvediloL (COREG) 25 MG tablet Take 1.5 tablets (37.5 mg total) by mouth 2 (two) times daily with meals. 3/21/23   Silvia Wang NP   cetirizine (ZYRTEC) 10 MG tablet Take 1 tablet (10 mg total) by mouth once daily. for 7 days 7/13/22 9/23/22  Laina Melvin, INOCENCIA   clonazePAM (KLONOPIN) 1 MG tablet TAKE 1 TABLET BY MOUTH DAILY AS NEEDED FOR ANXIETY 11/8/22   Kade Huffman III, NP   clopidogreL (PLAVIX) 75 mg tablet Take 1 tablet (75 mg total) by mouth once daily. 2/15/22   Alok Grossman MD   cyanocobalamin, vitamin B-12, 1,000 mcg Subl Place 1,000 mcg under the tongue once daily. 4/27/21   Tiffany Mina MD   diclofenac sodium  (VOLTAREN) 1 % Gel Apply 2 g topically 4 (four) times daily. 11/5/20   Mariel Tomlinson NP   donepezil (ARICEPT) 10 MG tablet Take 1 tablet (10 mg total) by mouth 2 (two) times daily. 7/18/17   Toby Paredes MD   EPINEPHrine (EPIPEN) 0.3 mg/0.3 mL AtIn Inject 0.3 mLs (0.3 mg total) into the muscle once. for 1 dose 12/2/21 10/19/22  Yahaira Santos PA-C   etodolac (LODINE) 500 MG tablet Take 1 tablet (500 mg total) by mouth 2 (two) times daily as needed (migraine). 1/21/21   David Cortez MD   famotidine (PEPCID) 20 MG tablet Take 1 tablet (20 mg total) by mouth 2 (two) times daily. for 7 days 7/13/22 10/19/22  INOCENCIA Neville   flurbiprofen (ANSAID) 100 MG tablet Take 1 tablet (100 mg total) by mouth 2 (two) times daily as needed (migraine). 2/24/23   David Cortez MD   flurbiprofen (ANSAID) 100 MG tablet Take 1 tablet (100 mg total) by mouth 2 (two) times daily as needed (migraine). 2/24/23   David Cortez MD   fremanezumab-vfrm (AJOVY) 225 mg/1.5 mL injection Inject 1.5 mLs (225 mg total) into the skin every 28 days. 2/17/22   David Cortez MD   furosemide (LASIX) 20 MG tablet Take 1 tablet (20 mg total) by mouth every other day. 11/22/22 11/22/23  Tiffany Mina MD   gabapentin (NEURONTIN) 300 MG capsule Take 1 capsule (300 mg total) by mouth 3 (three) times daily. 2/2/23   Hugo Borden MD   hydrOXYzine HCL (ATARAX) 25 MG tablet Take 1 tablet (25 mg total) by mouth 3 (three) times daily as needed for Itching. 7/13/22   INOCENCIA Neville   lancets Misc 1 Device by Misc.(Non-Drug; Combo Route) route 2 (two) times daily. 5/20/19   Tiffany Mina MD   losartan (COZAAR) 100 MG tablet Take 1 tablet (100 mg total) by mouth once daily. 4/14/22 4/14/23  Tiffany Mina MD   magnesium 200 mg Tab Take 200 mg by mouth once daily.  Patient taking differently: Take 200 mg by mouth every other day. 3/7/18   David Cortez MD   mupirocin (BACTROBAN) 2 % ointment Apply to affected area 3 times  daily 9/16/21   Tiffany Mina MD   omeprazole (PRILOSEC) 40 MG capsule Take 1 capsule (40 mg total) by mouth once daily. Take 30 minutes before breakfast 4/19/22 4/19/23  Tanmay Thomas MD   ondansetron (ZOFRAN-ODT) 4 MG TbDL Take 1 tablet (4 mg total) by mouth every 12 (twelve) hours as needed (nausea). 7/20/22   David Cortez MD   rivaroxaban (XARELTO) 20 mg Tab TAKE 1 TABLET BY MOUTH DAILY EVENING MEAL WITH DINNER 11/22/22   Avelino Mejia MD   rosuvastatin (CRESTOR) 40 MG Tab Take 1 tablet (40 mg total) by mouth every evening. 4/19/22   Tiffany Mina MD   sertraline (ZOLOFT) 100 MG tablet Take 1.5 tablets (150 mg total) by mouth once daily. 4/28/22   Kade Huffman III, NP   silver sulfADIAZINE 1% (SILVADENE) 1 % cream Apply topically 2 (two) times daily. 1/4/22   Tiffany Mina MD   suvorexant (BELSOMRA) 10 mg Tab Take 1 tablet by mouth nightly as needed (insomnia). 5/19/22   David Cortez MD   tiaGABine (GABITRIL) 4 MG tablet Take 1 tablet (4 mg total) by mouth every evening. 8/3/20   David Cortez MD   tiZANidine (ZANAFLEX) 4 MG tablet TAKE 1 TABLET(4 MG) BY MOUTH EVERY 6 HOURS AS NEEDED 6/13/22   David Cortez MD   traZODone (DESYREL) 100 MG tablet Take 1 tablet (100 mg total) by mouth every evening. 4/28/22 4/28/23  Kade Huffman III, NP   triamcinolone acetonide 0.1% (KENALOG) 0.1 % ointment Apply topically 2 (two) times daily. 10/19/22   Tiffany Mina MD   TRUE METRIX GLUCOSE METER Misc TEST BG BID 5/30/19   Historical Provider   ubrogepant (UBROGEPANT) 100 mg tablet Take 1 tablet (100 mg total) by mouth 2 (two) times daily as needed for Migraine. 6/22/21   David Cortez MD   zolpidem (AMBIEN) 10 mg Tab Take 1 tablet (10 mg total) by mouth nightly as needed (insomnia). 11/11/22   Kade Huffman III, IDA   metFORMIN (GLUCOPHAGE) 500 MG tablet Take 1 tablet (500 mg total) by mouth 2 (two) times daily with meals. 4/19/22 6/2/22  Tiffany Mina MD       Allergies:  Review of patient's allergies indicates:   Allergen  Reactions    Aspirin Hives    Imitrex [sumatriptan] Palpitations    Penicillins Hives and Swelling    Shellfish containing products Anaphylaxis     seafood    Reglan [metoclopramide hcl] Other (See Comments)     Parkinsonism        Social History:  Social History     Tobacco Use    Smoking status: Never     Passive exposure: Never    Smokeless tobacco: Never   Substance Use Topics    Alcohol use: Yes     Comment: wine, socially    Drug use: No        Review of Systems   Constitutional:  Negative for appetite change, chills, fatigue, fever and unexpected weight change.   HENT:  Negative for congestion, hearing loss and rhinorrhea.    Eyes:  Negative for pain and visual disturbance.   Respiratory:  Positive for cough (as per HPI). Negative for chest tightness, shortness of breath and wheezing.    Cardiovascular:  Negative for chest pain, palpitations and leg swelling.   Gastrointestinal:  Negative for abdominal distention and abdominal pain.   Endocrine: Negative for polydipsia and polyuria.   Genitourinary:  Negative for decreased urine volume, difficulty urinating, dysuria, hematuria and vaginal discharge.   Musculoskeletal:  Positive for back pain (controlled c prn meds).   Neurological:  Positive for headaches (controlled c meds from Neuro). Negative for weakness and numbness.   Psychiatric/Behavioral:  Positive for dysphoric mood (better on current meds). Negative for behavioral problems and confusion.        Health Maintenance:     Immunizations:   Influenza 10/2022  TDap 10/2/2018  Pneumovax 9/2018 here per pt  Shingrix 10/2019, 3/2020 completed.  Moderna COVID 2/2021, 3/2021, 1/2022, Omicron booster to be scheduled for tomorrow.     Cancer Screening:  PAP: 9/2019 c Dr. Marroquin wnl, rec f/u  Mammogram:  12/2021 benign, has repeat scheduled in 2024, will have checkout move appt to this year.  Colonoscopy:  5/2019, polyps x2 removed, tubular adenoma dn tubulovillous adenoma on path c rec repeat in 3 yrs per  "report.    Repeat done 6/2022 c polyp x1, path c/w hyperplastic polyp, rec f/u in 5 yrs.    Objective:   /82 (BP Location: Left arm, Patient Position: Sitting, BP Method: Large (Manual))   Pulse 86   Ht 5' 4" (1.626 m)   Wt (!) 139.9 kg (308 lb 6.8 oz)   LMP 01/03/2016   SpO2 95%   BMI 52.94 kg/m²        General: AAO x3, no apparent distress, coughing occasionally.  HEENT: PERRL, OP clear  CV: RRR, no m/r/g  Pulm: Lungs CTAB, no crackles, no wheezes  Abd: s/NT/ND +BS  Extremities: no c/c/e    Labs:     Lab Results   Component Value Date    WBC 3.60 (L) 10/19/2022    HGB 10.4 (L) 10/19/2022    HCT 32.5 (L) 10/19/2022    MCV 98 10/19/2022     10/19/2022     Lab Results   Component Value Date    IRON 80 01/18/2019    TRANSFERRIN 228 01/18/2019    TIBC 337 01/18/2019    FESATURATED 24 01/18/2019      Lab Results   Component Value Date    FERRITIN 59 01/18/2019     Lab Results   Component Value Date    LXBHZTLT13 331 10/19/2022         Sodium   Date Value Ref Range Status   10/19/2022 134 (L) 136 - 145 mmol/L Final     Potassium   Date Value Ref Range Status   10/19/2022 3.6 3.5 - 5.1 mmol/L Final     Chloride   Date Value Ref Range Status   10/19/2022 105 95 - 110 mmol/L Final     CO2   Date Value Ref Range Status   10/19/2022 23 23 - 29 mmol/L Final     Glucose   Date Value Ref Range Status   10/19/2022 133 (H) 70 - 110 mg/dL Final     BUN   Date Value Ref Range Status   10/19/2022 11 6 - 20 mg/dL Final     Creatinine   Date Value Ref Range Status   10/19/2022 0.8 0.5 - 1.4 mg/dL Final     Calcium   Date Value Ref Range Status   10/19/2022 9.0 8.7 - 10.5 mg/dL Final     Total Protein   Date Value Ref Range Status   10/19/2022 8.8 (H) 6.0 - 8.4 g/dL Final     Albumin   Date Value Ref Range Status   10/19/2022 2.7 (L) 3.5 - 5.2 g/dL Final     Total Bilirubin   Date Value Ref Range Status   10/19/2022 0.3 0.1 - 1.0 mg/dL Final     Comment:     For infants and newborns, interpretation of results should " be based  on gestational age, weight and in agreement with clinical  observations.    Premature Infant recommended reference ranges:  Up to 24 hours.............<8.0 mg/dL  Up to 48 hours............<12.0 mg/dL  3-5 days..................<15.0 mg/dL  6-29 days.................<15.0 mg/dL       Alkaline Phosphatase   Date Value Ref Range Status   10/19/2022 54 (L) 55 - 135 U/L Final     AST   Date Value Ref Range Status   10/19/2022 11 10 - 40 U/L Final     ALT   Date Value Ref Range Status   10/19/2022 22 10 - 44 U/L Final     Anion Gap   Date Value Ref Range Status   10/19/2022 6 (L) 8 - 16 mmol/L Final     eGFR if    Date Value Ref Range Status   05/17/2022 >60.0 >60 mL/min/1.73 m^2 Final     eGFR if non    Date Value Ref Range Status   05/17/2022 >60.0 >60 mL/min/1.73 m^2 Final     Comment:     Calculation used to obtain the estimated glomerular filtration  rate (eGFR) is the CKD-EPI equation.        Lab Results   Component Value Date    HGBA1C 6.1 (H) 10/19/2022     Lab Results   Component Value Date    LDLCALC 73.0 05/17/2022     Lab Results   Component Value Date    TSH 1.185 05/17/2022         Assessment/Plan     Amna was seen today for follow-up.    Diagnoses and all orders for this visit:    Chronic cough  Post COVID in Jan  Cough persistent, lungs clear on exam.  CXR today  Rec otc Mucinex.  -     X-Ray Chest PA And Lateral; Future    Essential hypertension  at goal.  Cont current medications.  Doing well on increased dose of coreg so far.  -     amLODIPine (NORVASC) 5 MG tablet; Take 1 tablet (5 mg total) by mouth once daily.  -     losartan (COZAAR) 100 MG tablet; Take 1 tablet (100 mg total) by mouth once daily.    Prediabetes  Lab Results   Component Value Date    HGBA1C 6.1 (H) 10/19/2022     Improved on last check  Cont dietary modifications and wt loss  -     Comprehensive Metabolic Panel; Future  -     Hemoglobin A1C; Future  -     TSH; Future    Mixed  hyperlipidemia  Lab Results   Component Value Date    LDLCALC 73.0 05/17/2022     Close to goal for hx CVA, Cont current medications.  -     rosuvastatin (CRESTOR) 40 MG Tab; Take 1 tablet (40 mg total) by mouth every evening.  -     Lipid Panel; Future    History of CVA (cerebrovascular accident)  Mild neurocognitive disorder due to multiple etiologies  Cont plavix, bp and lipid control  No acute issues    History of DVT (deep vein thrombosis)  No acute issues    Nonischemic cardiomyopathy from RV pacing, resolved with upgrade cardiac resynchronization therapy  Biventricular automatic implantable cardioverter defibrillator in situ  Paroxysmal atrial fibrillation  Long term (current) use of anticoagulants  As per HPI  Has pacer and on BB  Cont routine f/u c EP clinic.  No issues on Xarelto    Depression, major, recurrent, moderate  Anxiety  Followed by psych, cont routine f/u, stable.    Gastroesophageal reflux disease without esophagitis  Stable on current regimen.  -     omeprazole (PRILOSEC) 40 MG capsule; Take 1 capsule (40 mg total) by mouth once daily. Take 30 minutes before breakfast    Anemia due to vitamin B12 deficiency, unspecified B12 deficiency type  Stable, cont daily otc B12  -     CBC Auto Differential; Future  -     Vitamin B12; Future    Vitamin D deficiency  On Ergo twice weekly, will recheck levels  -     Vitamin D; Future    Class 3 severe obesity due to excess calories with serious comorbidity and body mass index (BMI) of 50.0 to 59.9 in adult  Down 5 lbs in the past mo  Encouraged continued dietary modifications and exercise as tolerated.      HM as above  RTC in 6 mos, sooner if needed.    Tiffany Mina MD  Department of Internal Medicine - [facility]  04/19/2023

## 2023-04-20 ENCOUNTER — DOCUMENTATION ONLY (OUTPATIENT)
Dept: REHABILITATION | Facility: HOSPITAL | Age: 58
End: 2023-04-20
Payer: MEDICARE

## 2023-04-20 NOTE — PROGRESS NOTES
Amna did not attend her 9:15 AM physical therapy session today. Her next scheduled session is on 4/24/23. No charges posted today.    Toby Ambrosio, PT  4/20/2023

## 2023-04-24 ENCOUNTER — HOSPITAL ENCOUNTER (OUTPATIENT)
Dept: RADIOLOGY | Facility: HOSPITAL | Age: 58
Discharge: HOME | End: 2023-04-24
Attending: INTERNAL MEDICINE
Payer: MEDICARE

## 2023-04-24 DIAGNOSIS — R05.3 CHRONIC COUGH: ICD-10-CM

## 2023-04-24 PROCEDURE — 71046 X-RAY EXAM CHEST 2 VIEWS: CPT | Mod: 26,,, | Performed by: RADIOLOGY

## 2023-04-24 PROCEDURE — 71046 XR CHEST PA AND LATERAL: ICD-10-PCS | Mod: 26,,, | Performed by: RADIOLOGY

## 2023-04-24 PROCEDURE — 71046 X-RAY EXAM CHEST 2 VIEWS: CPT | Mod: TC

## 2023-04-27 ENCOUNTER — PATIENT MESSAGE (OUTPATIENT)
Dept: CARDIOLOGY | Facility: CLINIC | Age: 58
End: 2023-04-27
Payer: MEDICARE

## 2023-04-27 ENCOUNTER — CLINICAL SUPPORT (OUTPATIENT)
Dept: REHABILITATION | Facility: HOSPITAL | Age: 58
End: 2023-04-27
Payer: MEDICARE

## 2023-04-27 DIAGNOSIS — Z74.09 IMPAIRED FUNCTIONAL MOBILITY, BALANCE, GAIT, AND ENDURANCE: Primary | Chronic | ICD-10-CM

## 2023-04-27 DIAGNOSIS — R29.898 LEFT LEG WEAKNESS: ICD-10-CM

## 2023-04-27 DIAGNOSIS — R00.2 PALPITATIONS: Primary | ICD-10-CM

## 2023-04-27 PROCEDURE — 97110 THERAPEUTIC EXERCISES: CPT | Mod: PO,CQ

## 2023-04-27 PROCEDURE — 97112 NEUROMUSCULAR REEDUCATION: CPT | Mod: PO,CQ

## 2023-04-27 NOTE — PROGRESS NOTES
"  Physical Therapy Daily Treatment Note     Name: Amna Chawla  Clinic Number: [id number]    Therapy Diagnosis:   Encounter Diagnoses   Name Primary?    Impaired functional mobility, balance, gait, and endurance Yes    Left leg weakness          Physician: Hugo Borden MD    Visit Date: 4/27/2023    Physician Orders: PT Eval and Treat   Medical Diagnosis from Referral:   R26.81 (ICD-10-CM) - Gait instability   I69.354 (ICD-10-CM) - Hemiparesis affecting left side as late effect of stroke   M54.41,M54.42,G89.29 (ICD-10-CM) - Chronic midline low back pain with bilateral sciatica      Evaluation Date: 3/7/2023  Authorization Period Expiration: 12/29/2023  Plan of Care Expiration: 05/02/2023  Progress note due: 04/07/2023  Visit # / Visits authorized: 5/ 20(+evaluation)    PTA visit: 1/5    Time In: 0915  Time Out: 1000   Total Billable Time: 45   minutes    Precautions: Standard and blood thinners    Subjective     Pt reports: " I had to miss Monday, because it was the 1 year anniversary of my sisters death.  I needed a break.  I'm sorry."     She was compliant with her home exercise program  Response to previous treatment: no adverse reactions   Functional change: none    Pain: 0/10  Location: N/A    Objective     Amna received therapeutic exercises to develop strength, endurance, ROM, flexibility, posture, and core stabilization for 8 minutes including:    X 8 minutes on SCI-FIT recumbent stepper, level 3.0, for B UE and LE muscular and CV endurance          Amna participated in neuromuscular re-education activities to improve: Balance, Coordination, Kinesthetic, Sense, Proprioception, and Posture for 37 minutes. The following activities were included:  In // bars: CGA  2 Balance beams in front of each other: CGA   2 x 30 sec of tandem stance with occ 1 finger touchdown   X 6 laps of side stepping with no UE support   X 6 laps of forward marching with 3 sec hold, occ 1 1 finger touchdown   X 6 laps of " forward tandem ambulation with occ 1 UE support    Small Bosu/round side up: CGA   X 2 min of B LE single leg alternating step ups with occ 1 UE support       Small Bosu/ soft side up: CGA   X 60 sec of medial/lateral weight shifting with occ 1 finger support   X 60 sec of anterior/posterior weight shifting with occ 1 finger support   X 60 sec of static standing with head turns right to left with occ 1 finger support   2 x 30 sec of B LE single leg stance with occ 1 finger support       Amna participated in dynamic functional therapeutic activities to improve functional performance for 0  minutes, including:      Amna participated in gait training to improve functional mobility and safety for 0  minutes, including:        Home Exercises Provided and Patient Education Provided     Education provided:   - Pt educated regarding evaluation findings, potential plan of care and scheduling process.   - first installment of HEP provided 3/9/2023    Written Home Exercises Provided: yes.  Exercises were reviewed and Amna was able to demonstrate them prior to the end of the session.  Amna demonstrated good  understanding of the education provided.     See EMR under Patient Instructions for exercises provided 3/9/2023.    Assessment     Amna tolerated her tx session well and did not have any problems noted.  Amna focused on balance and was able to perform single leg stance and single leg step ups on the Bosu with minimal support required.  Amna only required occasional 1 finger touchdown for balance today.  No loss of balance noted.      Amna Is progressing well towards her goals.   Pt prognosis is Good.     Pt will continue to benefit from skilled outpatient physical therapy to address the deficits listed in the problem list box on initial evaluation, provide pt/family education and to maximize pt's level of independence in the home and community environment.     Pt's spiritual, cultural and educational needs  considered and pt agreeable to plan of care and goals.     Anticipated barriers to physical therapy:     Goals:  Short Term Goals: 8 weeks   Pt will be at least partially independent with initial installment of HEP ~ MET, 4/13/23  Pt will improve her self- selected walking speed to 0.86 m/sec, demonstrating increased community ambulation~MET, 4/13/23  Pt will improve her FGA score to 13/30, demonstrating increased safety with dynamic gait activities~MET, 4/13/23  Pt will improve her 5 times sit to stand score to 14 seconds, indicating increased functional LE strength~ MET, 4/13/23     Long Term Goals: 8 weeks   Pt will report </= 41% limitation using FOTO assessment tool in order to demonstrate improved perception of abilities~MET, 4/13/23  Pt will improve her self- selected walking speed to 1.0 m/sec , demonstrating increased community ambulation.Ongoing  Pt will improve her fast- walking speed score to 1.2 m/sec, demonstrating increased safety with changes in gait speed during community ambulation~MET, 4/13/23  Pt will improve her FGA score to 15/30, demonstrating increased safety with dynamic gait activities~MET, 4/13/23~ revise this goal to 24/30  Pt will improve her 5 times sit to stand score to 12 seconds, indicating increased functional LE strength~progressing, 4/13/23  Pt will be independent with advanced HEP to help maintain potential gains realized in PT. Ongoing  Pt will ascend and descend 12 steps with use of rails while exhibiting reciprocal pattern in each direction~MET, 4/13/23~ revise to no use of rails    Plan     Progress gait and balance challenges.     Julissa Fine, PTA    4/27/2023

## 2023-04-28 NOTE — PROGRESS NOTES
Physical Therapy Daily Treatment Note     Name: Amna Chawla  [facility] Number: [id number]    Therapy Diagnosis:   Encounter Diagnoses   Name Primary?    Impaired functional mobility, balance, gait, and endurance Yes    Left leg weakness            Physician: Hugo Borden MD    Visit Date: 5/1/2023    Physician Orders: PT Eval and Treat   Medical Diagnosis from Referral:   R26.81 (ICD-10-CM) - Gait instability   I69.354 (ICD-10-CM) - Hemiparesis affecting left side as late effect of stroke   M54.41,M54.42,G89.29 (ICD-10-CM) - Chronic midline low back pain with bilateral sciatica      Evaluation Date: 3/7/2023  Authorization Period Expiration: 12/29/2023  Plan of Care Expiration: 05/02/2023  Updated Plan of Care Expiration: 6/2/23  Progress note due: 04/07/2023  Visit # / Visits authorized: 6/ 20(+evaluation)    PTA visit: 0/5    Time In: 0845     Time Out: 0930     Total Billable Time: 45    minutes    Precautions: Standard and blood thinners    Subjective     Pt reports: she is hurting this morning due to her sciatica. She tried to stretch this morning but could not bear the pain.  She was compliant with her home exercise program  Response to previous treatment: no adverse reactions   Functional change: none    Pain: 9/10  Location: R buttock and radiating down leg    Objective     Lower Extremity Strength (tested in sitting)  Right LE Eval 4/13/23 5/1/23 Left LE Eval 4/13/23 5/1/23   Hip flexion:  5/5 5/5 4/5 Hip flexion: 4-/5 4+/5 4+/5   Knee extension: 5/5 5/5 5/5 Knee extension: 4/5 4+/5 5/5   Knee flexion: 5/5 5/5 5/5 Knee flexion: 5/5 5/5 5/5   Ankle dorsiflexion:  5/5 5/5 5/5 Ankle dorsiflexion: 5/5 5/5 5/5   Ankle plantarflexion:  5/5 5/5 5/5 Ankle plantarflexion: 4+/5 5/5 5/5   Hip abduction: 5/5 5/5 5/5 Hip abduction: 5/5 5/5 5/5   Hip adduction: 5/5 5/5 5/5 Hip adduction 4/5 5/5 5/5   Hip extension: 5/5 5/5 4+/5 Hip extension: 4/5 4+/5 4+/5     Performed using wide blue chair:     Evaluation  4/13/23 5/1/23   Single Limb Stance R LE NT  (<10 sec = HIGH FALL RISK) NT  (<10 sec = HIGH FALL RISK) 5 seconds  (<10 sec = HIGH FALL RISK)   Single Limb Stance L LE NT  (<10 sec = HIGH FALL RISK) NT  (<10 sec = HIGH FALL RISK) 3 seconds  (<10 sec = HIGH FALL RISK)   5 times sit to stand 16 seconds, hands on thighs, performed on blue bench 13 seconds, hands on thighs, performed on gold chair 13 seconds, hands on thighs, performed on gold chair   TUG 12 seconds 9 seconds 9 seconds   Self selected walking speed 0.75 m/sec (6m/8s) 0.86 m/sec (6m/7s) 0.75 m/sec (6m/8s)   Fast walking speed 1.0 m/sec (6m/6s)    1.2 m/sec (6m/5s) 1.2 m/sec (6m/5s)         5x sit to stand normative information:   >12 sec= fall risk for general elderly  >16 sec= fall risk for Parkinson's disease  >10 sec= balance/vestibular dysfunction (<59 y/o)  >14.2 sec= balance/vestibular dysfunction (>59 y/o)  >12 sec= fall risk for CVA     Functional Gait Assessment:   1. Gait on level surface =  1              (3) Normal: less than 5.5 sec, no A.D., no imbalance, normal gait pattern, deviates< 6in              (2) Mild impairment: 7-5.6 sec, uses A.D., mild gait deviations, or deviates 6-10 in              (1) Moderate impairment: > 7 sec, slow speed, imbalance, deviates 10-15 in.              (0) Severe impairment: needs assist, deviates >15 in, reach/touch wall  2. Change in Gait Speed = 3              (3) Normal: smooth change w/o loss of balance or gait deviation, deviates < 6 in, significant difference between speeds              (2) Mild impairment: changes speed, but demonstrates mild gait deviations, deviates 6-10 in, OR no deviations but unable to significantly speed, OR uses A.D.              (1) Moderate impairment: minor changes to speed, OR changes speed w/ significant deviations, deviates 10-15 in, OR  Changes speed , but loses balance & recovers              (0) Severe impairment: cannot change speed, deviates >15 in, or loses balance  & needs assist  3. Gait with horizontal head turns  = 2              (3) Normal: no change in gait, deviates <6 in              (2) Mild impairment: slight change in speed, deviates 6-10 in, OR uses A.D.              (1) Moderate impairment: moderate change in speed, deviates 10-15 in              (0) Severe impairment: severe disruption of gait, deviates >15in  4. Gait with vertical head turns = 2              (3) Normal: no change in gait, deviates <6 in              (2) Mild impairment: slight change in speed, deviates 6-10 in OR uses A.D.              (1) Moderate impairment: moderate change in speed, deviates 10-15 in              (0) Severe impairment: severe disruption of gait, deviates >15 in  5. Gait with pivot turns = 3              (3) Normal: performs safely in 3 sec, no LOB              (2) Mild impairment: performs in >3 sec & no LOB, OR turns safely & requires several steps to regain LOB              (1) Moderate impairment: turns slow, OR requires several small steps for balance following turn & stop              (0) Severe impairment: cannot turn safely, needs assist  6. Step over obstacle = 3              (3) Normal: steps over 2 stacked boxes w/o change in speed or LOB              (2) Mild impairment: able to step over 1 box w/o change in speed or LOB              (1) Moderate impairment: steps over 1 box but must slow down, may require VC              (0) Severe impairment: cannot perform w/o assist  7. Gait with Narrow GAGE = 0              (3) Normal: 10 steps no staggering              (2) Mild impairment: 7-9 steps              (1) Moderate impairment: 4-7 steps              (0) Severe impairment: < 4 steps or cannot perform w/o assist  8. Gait with eyes closed = 2              (3) Normal: < 7 sec, no A.D., no LOB, normal gait pattern, deviates <6 in              (2) Mild impairment: 7.1-9 sec, mild gait deviations, deviates 6-10 in              (1) Moderate impairment: > 9 sec, abnormal  "pattern, LOB, deviates 10-15 in              (0) Severe impairment: cannot perform w/o assist, LOB, deviates >15in  9. Ambulating Backwards = 2              (3) Normal: no A.D., no LOB, normal gait pattern, deviates <6in              (2) Mild impairment: uses A.D., slower speed, mild gait deviations, deviates 6-10 in              (1) Moderate impairment: slow speed, abnormal gait pattern, LOB, deviates 10-15 in              (0) Severe impairment: severe gait deviations or LOB, deviates >15in  10. Steps = 2 (step through with use of rail)              (3) Normal: alternating feet, no rail              (2) Mild Impairment: alternating feet, uses rail              (1) Moderate impairment: step-to, uses rail              (0) Severe impairment: cannot perform safely     Score 11/30 at time of evaluation; 22/30 on 4/13/23; 20/30 on 5/1/23      Score:   <22/30 fall risk   <20/30 fall risk in older adults   <18/30 fall risk in Parkinsons     CMS Impairment/Limitation/Restriction for FOTO Stroke Lower Extremity Survey  Status Limitation G-Code CMS Severity Modifier  Intake 55% 45%  Predicted 59% 41% Goal Status+ CK - At least 40 percent but less than 60 percent  4/13/2023 63% 37%  5/1/2023 75% 25% Current Status CJ - At least 20 percent but less than 40 percent  D/C Status CJ **only report if this is discharge survey     Amna received therapeutic exercises to develop strength, endurance, ROM, flexibility, posture, and core stabilization for 21 minutes including:  (Includes above tests and measures)      Supine on mat:    3 x 30 " R LE passive piriformis stretch~ provided handout  2 x 30" R LE passive hamstring stretch          Amna participated in neuromuscular re-education activities to improve: Balance, Coordination, Kinesthetic, Sense, Proprioception, and Posture for 24 minutes. The following activities were included:  (Includes above tests and measures)         Amna participated in dynamic functional therapeutic " activities to improve functional performance for 0  minutes, including:      Amna participated in gait training to improve functional mobility and safety for 0  minutes, including:        Home Exercises Provided and Patient Education Provided     Education provided:   - Pt educated regarding evaluation findings, potential plan of care and scheduling process.   - first installment of HEP provided 3/9/2023  -5/1/23: PT provided pt the number to central scheduling so that she can schedule her evaluation for her back in ortho PT. PT also provided pt a schedule slip to make additional visits in May for neuro PT.    Written Home Exercises Provided: yes.  Exercises were reviewed and Amna was able to demonstrate them prior to the end of the session.  Amna demonstrated good  understanding of the education provided.     See EMR under Patient Instructions for exercises provided 3/9/2023.    Assessment     Amna tolerated her visit fairly well today for a plan of care reassessment, despite entering the clinic with R sciatic pain. PT assisted pt here by performing passive R LE piriformis stretches and issued her a handout to practice these at home with her family. As expected, her testing procedures were limited by R LE pain, though the stretches did seem to help per her report. Pt demonstrated only 1 strength grade improvement to L LE( knee extension). Her 5 x sit<> stand and TUG scores did not change from the previous reassessment. Her SSWS declined slightly and her FSWS remained the same. Pt's FGA score slipped from 22/30 at last reassessment to 20/30 today. PT feels pt should work in neuro PT for one more month and will be ready for discharge. In the meantime, she will attempt to schedule her evaluation in ortho to help deal with her sciatic and back pain. PT would like to extend the current plan of care until 6/2/23. Overall, pt remains a good candidate for skilled physical therapy addressing gait and balance deficits.  See below for most recent comments regarding goal achievement and any revisions made.    Amna Is progressing well towards her goals.   Pt prognosis is Good.     Pt will continue to benefit from skilled outpatient physical therapy to address the deficits listed in the problem list box on initial evaluation, provide pt/family education and to maximize pt's level of independence in the home and community environment.     Pt's spiritual, cultural and educational needs considered and pt agreeable to plan of care and goals.     Anticipated barriers to physical therapy:     Goals:  Short Term Goals: 8 weeks   Pt will be at least partially independent with initial installment of HEP ~ MET, 4/13/23  Pt will improve her self- selected walking speed to 0.86 m/sec, demonstrating increased community ambulation~MET, 4/13/23, NOT MET, 5/1/23  Pt will improve her FGA score to 13/30, demonstrating increased safety with dynamic gait activities~MET, 4/13/23  Pt will improve her 5 times sit to stand score to 14 seconds, indicating increased functional LE strength~ MET, 4/13/23     Long Term Goals: 8 weeks   Pt will report </= 41% limitation using FOTO assessment tool in order to demonstrate improved perception of abilities~MET, 4/13/23  Pt will improve her self- selected walking speed to 1.0 m/sec , demonstrating increased community ambulation.Ongoing  Pt will improve her fast- walking speed score to 1.2 m/sec, demonstrating increased safety with changes in gait speed during community ambulation~MET, 4/13/23  Pt will improve her FGA score to 15/30, demonstrating increased safety with dynamic gait activities~MET, 4/13/23~ revise this goal to 24/30~ revise again to 23/30, 5/1/23  Pt will improve her 5 times sit to stand score to 12 seconds, indicating increased functional LE strength~progressing, 4/13/23  Pt will be independent with advanced HEP to help maintain potential gains realized in PT. Ongoing  Pt will ascend and descend 12  steps with use of rails while exhibiting reciprocal pattern in each direction~MET, 4/13/23~ revise to no use of rails, remains current, 5/1/23  (NEW GOAL, 5/1/23): pt to improve her B SLS scores by 2-3 seconds each for overall decrease in fall risk    Plan     Continue outpatient physical therapy 2 x weekly under current established Plan of Care, 5/1/23 to 6/2/23, with treatment to include: pt education, HEP, therapeutic exercises, neuromuscular re-education/balance exercises, therapeutic activities, joint mobilizations, and modalities PRN, to work towards established goals. Pt may be seen by PTA to carry out plan of care.       Progress gait and balance challenges.     Toby Ambrosio, PT    5/1/2023

## 2023-04-30 ENCOUNTER — EXTERNAL CHRONIC CARE MANAGEMENT (OUTPATIENT)
Dept: PRIMARY CARE CLINIC | Facility: CLINIC | Age: 58
End: 2023-04-30
Payer: MEDICARE

## 2023-04-30 PROCEDURE — 99490 PR CHRONIC CARE MGMT, 1ST 20 MIN: ICD-10-PCS | Mod: S$PBB,,, | Performed by: INTERNAL MEDICINE

## 2023-04-30 PROCEDURE — 99490 CHRNC CARE MGMT STAFF 1ST 20: CPT | Mod: S$PBB,,, | Performed by: INTERNAL MEDICINE

## 2023-04-30 PROCEDURE — 99490 CHRNC CARE MGMT STAFF 1ST 20: CPT | Mod: PBBFAC | Performed by: INTERNAL MEDICINE

## 2023-05-01 ENCOUNTER — CLINICAL SUPPORT (OUTPATIENT)
Dept: REHABILITATION | Facility: HOSPITAL | Age: 58
End: 2023-05-01
Payer: MEDICARE

## 2023-05-01 DIAGNOSIS — Z74.09 IMPAIRED FUNCTIONAL MOBILITY, BALANCE, GAIT, AND ENDURANCE: Primary | Chronic | ICD-10-CM

## 2023-05-01 DIAGNOSIS — R29.898 LEFT LEG WEAKNESS: ICD-10-CM

## 2023-05-01 PROCEDURE — 97110 THERAPEUTIC EXERCISES: CPT | Mod: PO

## 2023-05-01 PROCEDURE — 97112 NEUROMUSCULAR REEDUCATION: CPT | Mod: PO

## 2023-05-01 NOTE — PLAN OF CARE
Physical Therapy Daily Treatment Note     Name: Amna Chawla  [facility] Number: [id number]    Therapy Diagnosis:   Encounter Diagnoses   Name Primary?    Impaired functional mobility, balance, gait, and endurance Yes    Left leg weakness            Physician: Hugo Borden MD    Visit Date: 5/1/2023    Physician Orders: PT Eval and Treat   Medical Diagnosis from Referral:   R26.81 (ICD-10-CM) - Gait instability   I69.354 (ICD-10-CM) - Hemiparesis affecting left side as late effect of stroke   M54.41,M54.42,G89.29 (ICD-10-CM) - Chronic midline low back pain with bilateral sciatica      Evaluation Date: 3/7/2023  Authorization Period Expiration: 12/29/2023  Plan of Care Expiration: 05/02/2023  Updated Plan of Care Expiration: 6/2/23  Progress note due: 04/07/2023  Visit # / Visits authorized: 6/ 20(+evaluation)    PTA visit: 0/5    Time In: 0845     Time Out: 0930     Total Billable Time: 45    minutes    Precautions: Standard and blood thinners    Subjective     Pt reports: she is hurting this morning due to her sciatica. She tried to stretch this morning but could not bear the pain.  She was compliant with her home exercise program  Response to previous treatment: no adverse reactions   Functional change: none    Pain: 9/10  Location: R buttock and radiating down leg    Objective     Lower Extremity Strength (tested in sitting)  Right LE Eval 4/13/23 5/1/23 Left LE Eval 4/13/23 5/1/23   Hip flexion:  5/5 5/5 4/5 Hip flexion: 4-/5 4+/5 4+/5   Knee extension: 5/5 5/5 5/5 Knee extension: 4/5 4+/5 5/5   Knee flexion: 5/5 5/5 5/5 Knee flexion: 5/5 5/5 5/5   Ankle dorsiflexion:  5/5 5/5 5/5 Ankle dorsiflexion: 5/5 5/5 5/5   Ankle plantarflexion:  5/5 5/5 5/5 Ankle plantarflexion: 4+/5 5/5 5/5   Hip abduction: 5/5 5/5 5/5 Hip abduction: 5/5 5/5 5/5   Hip adduction: 5/5 5/5 5/5 Hip adduction 4/5 5/5 5/5   Hip extension: 5/5 5/5 4+/5 Hip extension: 4/5 4+/5 4+/5     Performed using wide blue chair:     Evaluation  4/13/23 5/1/23   Single Limb Stance R LE NT  (<10 sec = HIGH FALL RISK) NT  (<10 sec = HIGH FALL RISK) 5 seconds  (<10 sec = HIGH FALL RISK)   Single Limb Stance L LE NT  (<10 sec = HIGH FALL RISK) NT  (<10 sec = HIGH FALL RISK) 3 seconds  (<10 sec = HIGH FALL RISK)   5 times sit to stand 16 seconds, hands on thighs, performed on blue bench 13 seconds, hands on thighs, performed on gold chair 13 seconds, hands on thighs, performed on gold chair   TUG 12 seconds 9 seconds 9 seconds   Self selected walking speed 0.75 m/sec (6m/8s) 0.86 m/sec (6m/7s) 0.75 m/sec (6m/8s)   Fast walking speed 1.0 m/sec (6m/6s)    1.2 m/sec (6m/5s) 1.2 m/sec (6m/5s)         5x sit to stand normative information:   >12 sec= fall risk for general elderly  >16 sec= fall risk for Parkinson's disease  >10 sec= balance/vestibular dysfunction (<61 y/o)  >14.2 sec= balance/vestibular dysfunction (>61 y/o)  >12 sec= fall risk for CVA     Functional Gait Assessment:   1. Gait on level surface =  1              (3) Normal: less than 5.5 sec, no A.D., no imbalance, normal gait pattern, deviates< 6in              (2) Mild impairment: 7-5.6 sec, uses A.D., mild gait deviations, or deviates 6-10 in              (1) Moderate impairment: > 7 sec, slow speed, imbalance, deviates 10-15 in.              (0) Severe impairment: needs assist, deviates >15 in, reach/touch wall  2. Change in Gait Speed = 3              (3) Normal: smooth change w/o loss of balance or gait deviation, deviates < 6 in, significant difference between speeds              (2) Mild impairment: changes speed, but demonstrates mild gait deviations, deviates 6-10 in, OR no deviations but unable to significantly speed, OR uses A.D.              (1) Moderate impairment: minor changes to speed, OR changes speed w/ significant deviations, deviates 10-15 in, OR  Changes speed , but loses balance & recovers              (0) Severe impairment: cannot change speed, deviates >15 in, or loses balance  & needs assist  3. Gait with horizontal head turns  = 2              (3) Normal: no change in gait, deviates <6 in              (2) Mild impairment: slight change in speed, deviates 6-10 in, OR uses A.D.              (1) Moderate impairment: moderate change in speed, deviates 10-15 in              (0) Severe impairment: severe disruption of gait, deviates >15in  4. Gait with vertical head turns = 2              (3) Normal: no change in gait, deviates <6 in              (2) Mild impairment: slight change in speed, deviates 6-10 in OR uses A.D.              (1) Moderate impairment: moderate change in speed, deviates 10-15 in              (0) Severe impairment: severe disruption of gait, deviates >15 in  5. Gait with pivot turns = 3              (3) Normal: performs safely in 3 sec, no LOB              (2) Mild impairment: performs in >3 sec & no LOB, OR turns safely & requires several steps to regain LOB              (1) Moderate impairment: turns slow, OR requires several small steps for balance following turn & stop              (0) Severe impairment: cannot turn safely, needs assist  6. Step over obstacle = 3              (3) Normal: steps over 2 stacked boxes w/o change in speed or LOB              (2) Mild impairment: able to step over 1 box w/o change in speed or LOB              (1) Moderate impairment: steps over 1 box but must slow down, may require VC              (0) Severe impairment: cannot perform w/o assist  7. Gait with Narrow GAGE = 0              (3) Normal: 10 steps no staggering              (2) Mild impairment: 7-9 steps              (1) Moderate impairment: 4-7 steps              (0) Severe impairment: < 4 steps or cannot perform w/o assist  8. Gait with eyes closed = 2              (3) Normal: < 7 sec, no A.D., no LOB, normal gait pattern, deviates <6 in              (2) Mild impairment: 7.1-9 sec, mild gait deviations, deviates 6-10 in              (1) Moderate impairment: > 9 sec, abnormal  "pattern, LOB, deviates 10-15 in              (0) Severe impairment: cannot perform w/o assist, LOB, deviates >15in  9. Ambulating Backwards = 2              (3) Normal: no A.D., no LOB, normal gait pattern, deviates <6in              (2) Mild impairment: uses A.D., slower speed, mild gait deviations, deviates 6-10 in              (1) Moderate impairment: slow speed, abnormal gait pattern, LOB, deviates 10-15 in              (0) Severe impairment: severe gait deviations or LOB, deviates >15in  10. Steps = 2 (step through with use of rail)              (3) Normal: alternating feet, no rail              (2) Mild Impairment: alternating feet, uses rail              (1) Moderate impairment: step-to, uses rail              (0) Severe impairment: cannot perform safely     Score 11/30 at time of evaluation; 22/30 on 4/13/23; 20/30 on 5/1/23      Score:   <22/30 fall risk   <20/30 fall risk in older adults   <18/30 fall risk in Parkinsons     CMS Impairment/Limitation/Restriction for FOTO Stroke Lower Extremity Survey  Status Limitation G-Code CMS Severity Modifier  Intake 55% 45%  Predicted 59% 41% Goal Status+ CK - At least 40 percent but less than 60 percent  4/13/2023 63% 37%  5/1/2023 75% 25% Current Status CJ - At least 20 percent but less than 40 percent  D/C Status CJ **only report if this is discharge survey     Amna received therapeutic exercises to develop strength, endurance, ROM, flexibility, posture, and core stabilization for 21 minutes including:  (Includes above tests and measures)      Supine on mat:    3 x 30 " R LE passive piriformis stretch~ provided handout  2 x 30" R LE passive hamstring stretch          Amna participated in neuromuscular re-education activities to improve: Balance, Coordination, Kinesthetic, Sense, Proprioception, and Posture for 24 minutes. The following activities were included:  (Includes above tests and measures)         Amna participated in dynamic functional therapeutic " activities to improve functional performance for 0  minutes, including:      Amna participated in gait training to improve functional mobility and safety for 0  minutes, including:        Home Exercises Provided and Patient Education Provided     Education provided:   - Pt educated regarding evaluation findings, potential plan of care and scheduling process.   - first installment of HEP provided 3/9/2023  -5/1/23: PT provided pt the number to central scheduling so that she can schedule her evaluation for her back in ortho PT. PT also provided pt a schedule slip to make additional visits in May for neuro PT.    Written Home Exercises Provided: yes.  Exercises were reviewed and Amna was able to demonstrate them prior to the end of the session.  Amna demonstrated good  understanding of the education provided.     See EMR under Patient Instructions for exercises provided 3/9/2023.    Assessment     Amna tolerated her visit fairly well today for a plan of care reassessment, despite entering the clinic with R sciatic pain. PT assisted pt here by performing passive R LE piriformis stretches and issued her a handout to practice these at home with her family. As expected, her testing procedures were limited by R LE pain, though the stretches did seem to help per her report. Pt demonstrated only 1 strength grade improvement to L LE( knee extension). Her 5 x sit<> stand and TUG scores did not change from the previous reassessment. Her SSWS declined slightly and her FSWS remained the same. Pt's FGA score slipped from 22/30 at last reassessment to 20/30 today. PT feels pt should work in neuro PT for one more month and will be ready for discharge. In the meantime, she will attempt to schedule her evaluation in ortho to help deal with her sciatic and back pain. PT would like to extend the current plan of care until 6/2/23. Overall, pt remains a good candidate for skilled physical therapy addressing gait and balance deficits.  See below for most recent comments regarding goal achievement and any revisions made.    Amna Is progressing well towards her goals.   Pt prognosis is Good.     Pt will continue to benefit from skilled outpatient physical therapy to address the deficits listed in the problem list box on initial evaluation, provide pt/family education and to maximize pt's level of independence in the home and community environment.     Pt's spiritual, cultural and educational needs considered and pt agreeable to plan of care and goals.     Anticipated barriers to physical therapy:     Goals:  Short Term Goals: 8 weeks   Pt will be at least partially independent with initial installment of HEP ~ MET, 4/13/23  Pt will improve her self- selected walking speed to 0.86 m/sec, demonstrating increased community ambulation~MET, 4/13/23, NOT MET, 5/1/23  Pt will improve her FGA score to 13/30, demonstrating increased safety with dynamic gait activities~MET, 4/13/23  Pt will improve her 5 times sit to stand score to 14 seconds, indicating increased functional LE strength~ MET, 4/13/23     Long Term Goals: 8 weeks   Pt will report </= 41% limitation using FOTO assessment tool in order to demonstrate improved perception of abilities~MET, 4/13/23  Pt will improve her self- selected walking speed to 1.0 m/sec , demonstrating increased community ambulation.Ongoing  Pt will improve her fast- walking speed score to 1.2 m/sec, demonstrating increased safety with changes in gait speed during community ambulation~MET, 4/13/23  Pt will improve her FGA score to 15/30, demonstrating increased safety with dynamic gait activities~MET, 4/13/23~ revise this goal to 24/30~ revise again to 23/30, 5/1/23  Pt will improve her 5 times sit to stand score to 12 seconds, indicating increased functional LE strength~progressing, 4/13/23  Pt will be independent with advanced HEP to help maintain potential gains realized in PT. Ongoing  Pt will ascend and descend 12  steps with use of rails while exhibiting reciprocal pattern in each direction~MET, 4/13/23~ revise to no use of rails, remains current, 5/1/23  (NEW GOAL, 5/1/23): pt to improve her B SLS scores by 2-3 seconds each for overall decrease in fall risk    Plan     Continue outpatient physical therapy 2 x weekly under current established Plan of Care, 5/1/23 to 6/2/23, with treatment to include: pt education, HEP, therapeutic exercises, neuromuscular re-education/balance exercises, therapeutic activities, joint mobilizations, and modalities PRN, to work towards established goals. Pt may be seen by PTA to carry out plan of care.       Progress gait and balance challenges.     Toby Ambrosio, PT    5/1/2023

## 2023-05-03 ENCOUNTER — DOCUMENTATION ONLY (OUTPATIENT)
Dept: REHABILITATION | Facility: HOSPITAL | Age: 58
End: 2023-05-03

## 2023-05-03 ENCOUNTER — TELEPHONE (OUTPATIENT)
Dept: REHABILITATION | Facility: HOSPITAL | Age: 58
End: 2023-05-03

## 2023-05-03 ENCOUNTER — PATIENT MESSAGE (OUTPATIENT)
Dept: NEUROLOGY | Facility: CLINIC | Age: 58
End: 2023-05-03
Payer: MEDICARE

## 2023-05-03 NOTE — PROGRESS NOTES
Documentation Only/ No Show      Patient: Amna Chawla  Date of Session: 5/3/2023  Diagnosis: No diagnosis found.  MRN: [id number]  Amna Chawla did not attend his/her scheduled therapy appointment today. Amna did not call to cancel nor reschedule.  Next appointment is scheduled for 5/8/23 will follow up with patient at that time. No charges have been posted today.       Julissa Fine, PTA  05/03/2023

## 2023-05-03 NOTE — TELEPHONE ENCOUNTER
Called pat at 11 am to check on her.   Left VM to ask how she was doing and inform her of her appt on Monday at 8:45 with Toby.      Julissa Fine, PTA  05/03/2023

## 2023-05-05 ENCOUNTER — LAB VISIT (OUTPATIENT)
Dept: LAB | Facility: HOSPITAL | Age: 58
End: 2023-05-05
Attending: INTERNAL MEDICINE
Payer: MEDICARE

## 2023-05-05 DIAGNOSIS — E78.2 MIXED HYPERLIPIDEMIA: ICD-10-CM

## 2023-05-05 DIAGNOSIS — R73.03 PREDIABETES: ICD-10-CM

## 2023-05-05 DIAGNOSIS — D51.9 ANEMIA DUE TO VITAMIN B12 DEFICIENCY, UNSPECIFIED B12 DEFICIENCY TYPE: ICD-10-CM

## 2023-05-05 DIAGNOSIS — E55.9 VITAMIN D DEFICIENCY: ICD-10-CM

## 2023-05-05 LAB
25(OH)D3+25(OH)D2 SERPL-MCNC: 21 NG/ML (ref 30–96)
ALBUMIN SERPL BCP-MCNC: 2.9 G/DL (ref 3.5–5.2)
ALP SERPL-CCNC: 56 U/L (ref 55–135)
ALT SERPL W/O P-5'-P-CCNC: 14 U/L (ref 10–44)
ANION GAP SERPL CALC-SCNC: 5 MMOL/L (ref 8–16)
AST SERPL-CCNC: 13 U/L (ref 10–40)
BASOPHILS # BLD AUTO: 0.01 K/UL (ref 0–0.2)
BASOPHILS NFR BLD: 0.4 % (ref 0–1.9)
BILIRUB SERPL-MCNC: 0.2 MG/DL (ref 0.1–1)
BUN SERPL-MCNC: 7 MG/DL (ref 6–20)
CALCIUM SERPL-MCNC: 8.7 MG/DL (ref 8.7–10.5)
CHLORIDE SERPL-SCNC: 107 MMOL/L (ref 95–110)
CHOLEST SERPL-MCNC: 97 MG/DL (ref 120–199)
CHOLEST/HDLC SERPL: 3.1 {RATIO} (ref 2–5)
CO2 SERPL-SCNC: 24 MMOL/L (ref 23–29)
CREAT SERPL-MCNC: 0.8 MG/DL (ref 0.5–1.4)
DIFFERENTIAL METHOD: ABNORMAL
EOSINOPHIL # BLD AUTO: 0 K/UL (ref 0–0.5)
EOSINOPHIL NFR BLD: 0.4 % (ref 0–8)
ERYTHROCYTE [DISTWIDTH] IN BLOOD BY AUTOMATED COUNT: 16.6 % (ref 11.5–14.5)
EST. GFR  (NO RACE VARIABLE): >60 ML/MIN/1.73 M^2
ESTIMATED AVG GLUCOSE: 131 MG/DL (ref 68–131)
GLUCOSE SERPL-MCNC: 106 MG/DL (ref 70–110)
HBA1C MFR BLD: 6.2 % (ref 4–5.6)
HCT VFR BLD AUTO: 32.7 % (ref 37–48.5)
HDLC SERPL-MCNC: 31 MG/DL (ref 40–75)
HDLC SERPL: 32 % (ref 20–50)
HGB BLD-MCNC: 10.4 G/DL (ref 12–16)
IMM GRANULOCYTES # BLD AUTO: 0.01 K/UL (ref 0–0.04)
IMM GRANULOCYTES NFR BLD AUTO: 0.4 % (ref 0–0.5)
LDLC SERPL CALC-MCNC: 52.6 MG/DL (ref 63–159)
LYMPHOCYTES # BLD AUTO: 1.1 K/UL (ref 1–4.8)
LYMPHOCYTES NFR BLD: 40.1 % (ref 18–48)
MCH RBC QN AUTO: 29.5 PG (ref 27–31)
MCHC RBC AUTO-ENTMCNC: 31.8 G/DL (ref 32–36)
MCV RBC AUTO: 93 FL (ref 82–98)
MONOCYTES # BLD AUTO: 0.3 K/UL (ref 0.3–1)
MONOCYTES NFR BLD: 12.4 % (ref 4–15)
NEUTROPHILS # BLD AUTO: 1.3 K/UL (ref 1.8–7.7)
NEUTROPHILS NFR BLD: 46.3 % (ref 38–73)
NONHDLC SERPL-MCNC: 66 MG/DL
NRBC BLD-RTO: 0 /100 WBC
PLATELET # BLD AUTO: 220 K/UL (ref 150–450)
PMV BLD AUTO: 11.5 FL (ref 9.2–12.9)
POTASSIUM SERPL-SCNC: 3.8 MMOL/L (ref 3.5–5.1)
PROT SERPL-MCNC: 9.5 G/DL (ref 6–8.4)
RBC # BLD AUTO: 3.53 M/UL (ref 4–5.4)
SODIUM SERPL-SCNC: 136 MMOL/L (ref 136–145)
TRIGL SERPL-MCNC: 67 MG/DL (ref 30–150)
TSH SERPL DL<=0.005 MIU/L-ACNC: 0.98 UIU/ML (ref 0.4–4)
VIT B12 SERPL-MCNC: 309 PG/ML (ref 210–950)
WBC # BLD AUTO: 2.74 K/UL (ref 3.9–12.7)

## 2023-05-05 PROCEDURE — 80053 COMPREHEN METABOLIC PANEL: CPT | Performed by: INTERNAL MEDICINE

## 2023-05-05 PROCEDURE — 85025 COMPLETE CBC W/AUTO DIFF WBC: CPT | Performed by: INTERNAL MEDICINE

## 2023-05-05 PROCEDURE — 36415 COLL VENOUS BLD VENIPUNCTURE: CPT | Performed by: INTERNAL MEDICINE

## 2023-05-05 PROCEDURE — 84443 ASSAY THYROID STIM HORMONE: CPT | Performed by: INTERNAL MEDICINE

## 2023-05-05 PROCEDURE — 80061 LIPID PANEL: CPT | Performed by: INTERNAL MEDICINE

## 2023-05-05 PROCEDURE — 83036 HEMOGLOBIN GLYCOSYLATED A1C: CPT | Performed by: INTERNAL MEDICINE

## 2023-05-05 PROCEDURE — 82306 VITAMIN D 25 HYDROXY: CPT | Performed by: INTERNAL MEDICINE

## 2023-05-05 PROCEDURE — 82607 VITAMIN B-12: CPT | Performed by: INTERNAL MEDICINE

## 2023-05-07 NOTE — PROGRESS NOTES
"  Physical Therapy Daily Treatment Note     Name: Amna Chawla  Clinic Number: [id number]    Therapy Diagnosis:   Encounter Diagnoses   Name Primary?    Impaired functional mobility, balance, gait, and endurance Yes    Left leg weakness              Physician: Hugo Borden MD    Visit Date: 5/8/2023    Physician Orders: PT Eval and Treat   Medical Diagnosis from Referral:   R26.81 (ICD-10-CM) - Gait instability   I69.354 (ICD-10-CM) - Hemiparesis affecting left side as late effect of stroke   M54.41,M54.42,G89.29 (ICD-10-CM) - Chronic midline low back pain with bilateral sciatica      Evaluation Date: 3/7/2023  Authorization Period Expiration: 12/29/2023  Plan of Care Expiration: 05/02/2023  Updated Plan of Care Expiration: 6/2/23  Progress note due: 04/07/2023  Visit # / Visits authorized: 7/ 20(+evaluation)    PTA visit: 0/5    Time In: 0832      Time Out: 0917     Total Billable Time: 45     minutes    Precautions: Standard and blood thinners    Subjective     Pt reports: she feels well today.  She was compliant with her home exercise program  Response to previous treatment: no adverse reactions   Functional change: none    Pain: 0/10  Location: N/A    Objective        Amna received therapeutic exercises to develop strength, endurance, ROM, flexibility, posture, and core stabilization for 12 minutes including:       X 8 minutes on SCI-FIT recumbent stepper, level 3.0, for B UE and LE muscular and CV endurance  SpO2 97 % ad HR 97     Inside parallel bars:    1 x 10 B LE step ups/downs on 6" block, R LE leading, B UE support, S  1 x 10 B LE step ups/downs on 6" block, L LE leading, B UE support, S      Amna participated in neuromuscular re-education activities to improve: Balance, Coordination, Kinesthetic, Sense, Proprioception, and Posture for 33 minutes. The following activities were included:    Inside parallel bars:     1 x 10 B LE forward/backward step ups/downs on foam fitter, no UE support, CGA  2 " "x 30" static standing on foam pad, narrow base of support, eyes closed, no UE support, CGA  2 x 10 B LE alternate toe taps against orange cone, standing on foam fitter, no UE support, CGA to slight min A     On wooden rocker board:  3 x 30" working board in A/P directions, no UE support, CGA~ one episode of grasping bars  2 x 30" working board in M/L directions, no UE support, CGA    X 4 laps tandem walking, no UE support, CGA    X 4 laps forward stepping over 4 hurdles, R LE leading, no UE support, CGA  X 4 laps side stepping over 4 hurdles, L LE leading,  no UE support, CGA    Outside parallel bars:  X 2 minutes rolling basketball back and forth with alternate feet, no UE support, CGA to very slight min A    4 square step activity:  X 5 trials CW and 5 trials CCW, no UE support, CGA    1 x 10 B LE forward/backward step ups/downs on round side of small Bosu, no UE support, CGA to occasional min A       Amna participated in dynamic functional therapeutic activities to improve functional performance for 0  minutes, including:      Amna participated in gait training to improve functional mobility and safety for 0  minutes, including:        Home Exercises Provided and Patient Education Provided     Education provided:   - Pt educated regarding evaluation findings, potential plan of care and scheduling process.   - first installment of HEP provided 3/9/2023  -5/1/23: PT provided pt the number to central scheduling so that she can schedule her evaluation for her back in ortho PT. PT also provided pt a schedule slip to make additional visits in May for neuro PT.    Written Home Exercises Provided: yes.  Exercises were reviewed and Amna was able to demonstrate them prior to the end of the session.  Amna demonstrated good  understanding of the education provided.     See EMR under Patient Instructions for exercises provided 3/9/2023.    Assessment     Ms. Woo tolerated her visit well with no reports of back pain at " any time during today's session. PT had pt continue her work on the SCI-FIT recumbent stepper at level 3.0 and progressed some balance challenges, inside and outside the parallel bars. PT introduced M/L working of rocker board, 4 square step activity, rolling basketball with alternate feet and step ups/downs on soft side of small Bosu. Pt performed all the above well, though she needed occasional rest periods to complete all tasks. Overall, pt remains a good candidate for skilled physical therapy addressing gait and balance deficits.    Amna Is progressing well towards her goals.   Pt prognosis is Good.     Pt will continue to benefit from skilled outpatient physical therapy to address the deficits listed in the problem list box on initial evaluation, provide pt/family education and to maximize pt's level of independence in the home and community environment.     Pt's spiritual, cultural and educational needs considered and pt agreeable to plan of care and goals.     Anticipated barriers to physical therapy:     Goals:  Short Term Goals: 8 weeks   Pt will be at least partially independent with initial installment of HEP ~ MET, 4/13/23  Pt will improve her self- selected walking speed to 0.86 m/sec, demonstrating increased community ambulation~MET, 4/13/23, NOT MET, 5/1/23  Pt will improve her FGA score to 13/30, demonstrating increased safety with dynamic gait activities~MET, 4/13/23  Pt will improve her 5 times sit to stand score to 14 seconds, indicating increased functional LE strength~ MET, 4/13/23     Long Term Goals: 8 weeks   Pt will report </= 41% limitation using FOTO assessment tool in order to demonstrate improved perception of abilities~MET, 4/13/23  Pt will improve her self- selected walking speed to 1.0 m/sec , demonstrating increased community ambulation.Ongoing  Pt will improve her fast- walking speed score to 1.2 m/sec, demonstrating increased safety with changes in gait speed during community  ambulation~MET, 4/13/23  Pt will improve her FGA score to 15/30, demonstrating increased safety with dynamic gait activities~MET, 4/13/23~ revise this goal to 24/30~ revise again to 23/30, 5/1/23  Pt will improve her 5 times sit to stand score to 12 seconds, indicating increased functional LE strength~progressing, 4/13/23  Pt will be independent with advanced HEP to help maintain potential gains realized in PT. Ongoing  Pt will ascend and descend 12 steps with use of rails while exhibiting reciprocal pattern in each direction~MET, 4/13/23~ revise to no use of rails, remains current, 5/1/23  (NEW GOAL, 5/1/23): pt to improve her B SLS scores by 2-3 seconds each for overall decrease in fall risk    Plan       Progress gait and balance challenges.     Toby Ambrosio, PT    5/8/2023

## 2023-05-08 ENCOUNTER — CLINICAL SUPPORT (OUTPATIENT)
Dept: REHABILITATION | Facility: HOSPITAL | Age: 58
End: 2023-05-08
Payer: MEDICARE

## 2023-05-08 ENCOUNTER — PATIENT MESSAGE (OUTPATIENT)
Dept: INTERNAL MEDICINE | Facility: CLINIC | Age: 58
End: 2023-05-08
Payer: MEDICARE

## 2023-05-08 DIAGNOSIS — Z74.09 IMPAIRED FUNCTIONAL MOBILITY, BALANCE, GAIT, AND ENDURANCE: Primary | Chronic | ICD-10-CM

## 2023-05-08 DIAGNOSIS — R29.898 LEFT LEG WEAKNESS: ICD-10-CM

## 2023-05-08 PROCEDURE — 97112 NEUROMUSCULAR REEDUCATION: CPT | Mod: PO

## 2023-05-08 PROCEDURE — 97110 THERAPEUTIC EXERCISES: CPT | Mod: PO

## 2023-05-10 DIAGNOSIS — R60.0 LEG EDEMA: ICD-10-CM

## 2023-05-10 RX ORDER — FUROSEMIDE 20 MG/1
20 TABLET ORAL EVERY OTHER DAY
Qty: 45 TABLET | Refills: 3 | Status: SHIPPED | OUTPATIENT
Start: 2023-05-10 | End: 2024-05-09

## 2023-05-11 ENCOUNTER — DOCUMENTATION ONLY (OUTPATIENT)
Dept: REHABILITATION | Facility: HOSPITAL | Age: 58
End: 2023-05-11
Payer: MEDICARE

## 2023-05-11 NOTE — PROGRESS NOTES
Amna did not attend her scheduled 9:15 AM physical therapy appointment today. Her next scheduled visit is on 5/15/23. No charges posted to account.    Toby Ambrosio, PT  5/11/2023

## 2023-05-15 ENCOUNTER — TELEPHONE (OUTPATIENT)
Dept: REHABILITATION | Facility: HOSPITAL | Age: 58
End: 2023-05-15
Payer: MEDICARE

## 2023-05-15 NOTE — TELEPHONE ENCOUNTER
PT called pt to check on her, as she missed her second consecutive appointment this morning. Pt reports she has been having migraine headaches which have prevented her from attending therapy. PT informed pt that her next scheduled session is on 5/17/23 at 8: 45 AM.  No charges posted today.      Toby Ambrosio, PT  5/15/2023

## 2023-05-16 ENCOUNTER — PES CALL (OUTPATIENT)
Dept: ADMINISTRATIVE | Facility: CLINIC | Age: 58
End: 2023-05-16
Payer: MEDICARE

## 2023-05-16 ENCOUNTER — PATIENT MESSAGE (OUTPATIENT)
Dept: NEUROLOGY | Facility: CLINIC | Age: 58
End: 2023-05-16
Payer: MEDICARE

## 2023-05-16 DIAGNOSIS — E78.2 MIXED HYPERLIPIDEMIA: ICD-10-CM

## 2023-05-16 RX ORDER — ROSUVASTATIN CALCIUM 40 MG/1
TABLET, COATED ORAL
Qty: 90 TABLET | Refills: 3 | Status: SHIPPED | OUTPATIENT
Start: 2023-05-16 | End: 2024-04-01 | Stop reason: SDUPTHER

## 2023-05-16 NOTE — TELEPHONE ENCOUNTER
No care due was identified.  [facility] Embedded Care Due Messages. Reference number: [id number].   5/16/2023 9:37:14 AM CDT

## 2023-05-17 ENCOUNTER — DOCUMENTATION ONLY (OUTPATIENT)
Dept: REHABILITATION | Facility: HOSPITAL | Age: 58
End: 2023-05-17

## 2023-05-17 ENCOUNTER — HOSPITAL ENCOUNTER (OUTPATIENT)
Dept: CARDIOLOGY | Facility: CLINIC | Age: 58
Discharge: HOME | End: 2023-05-17
Payer: MEDICARE

## 2023-05-17 ENCOUNTER — OFFICE VISIT (OUTPATIENT)
Dept: CARDIOLOGY | Facility: CLINIC | Age: 58
End: 2023-05-17
Payer: MEDICARE

## 2023-05-17 VITALS
DIASTOLIC BLOOD PRESSURE: 70 MMHG | HEART RATE: 85 BPM | OXYGEN SATURATION: 96 % | WEIGHT: 293 LBS | HEIGHT: 64 IN | BODY MASS INDEX: 50.02 KG/M2 | SYSTOLIC BLOOD PRESSURE: 150 MMHG

## 2023-05-17 DIAGNOSIS — E78.2 MIXED HYPERLIPIDEMIA: ICD-10-CM

## 2023-05-17 DIAGNOSIS — Z95.810 BIVENTRICULAR AUTOMATIC IMPLANTABLE CARDIOVERTER DEFIBRILLATOR IN SITU: ICD-10-CM

## 2023-05-17 DIAGNOSIS — I42.8 NONISCHEMIC CARDIOMYOPATHY: Primary | ICD-10-CM

## 2023-05-17 DIAGNOSIS — I44.2 COMPLETE AV BLOCK: ICD-10-CM

## 2023-05-17 DIAGNOSIS — I10 ESSENTIAL HYPERTENSION: ICD-10-CM

## 2023-05-17 DIAGNOSIS — R00.2 PALPITATIONS: ICD-10-CM

## 2023-05-17 DIAGNOSIS — I48.0 PAROXYSMAL ATRIAL FIBRILLATION: ICD-10-CM

## 2023-05-17 DIAGNOSIS — Z95.0 PACEMAKER: ICD-10-CM

## 2023-05-17 DIAGNOSIS — Z86.74 HISTORY OF SUDDEN CARDIAC ARREST: ICD-10-CM

## 2023-05-17 PROCEDURE — 93005 ELECTROCARDIOGRAM TRACING: CPT | Mod: PBBFAC | Performed by: INTERNAL MEDICINE

## 2023-05-17 PROCEDURE — 99999 PR PBB SHADOW E&M-EST. PATIENT-LVL V: ICD-10-PCS | Mod: PBBFAC,GC,, | Performed by: INTERNAL MEDICINE

## 2023-05-17 PROCEDURE — 99999 PR PBB SHADOW E&M-EST. PATIENT-LVL V: CPT | Mod: PBBFAC,GC,, | Performed by: INTERNAL MEDICINE

## 2023-05-17 PROCEDURE — 93010 EKG 12-LEAD: ICD-10-PCS | Mod: S$PBB,,, | Performed by: INTERNAL MEDICINE

## 2023-05-17 PROCEDURE — 93010 ELECTROCARDIOGRAM REPORT: CPT | Mod: S$PBB,,, | Performed by: INTERNAL MEDICINE

## 2023-05-17 PROCEDURE — 99214 OFFICE O/P EST MOD 30 MIN: CPT | Mod: S$PBB,GC,, | Performed by: INTERNAL MEDICINE

## 2023-05-17 PROCEDURE — 99215 OFFICE O/P EST HI 40 MIN: CPT | Mod: PBBFAC | Performed by: INTERNAL MEDICINE

## 2023-05-17 PROCEDURE — 99214 PR OFFICE/OUTPT VISIT, EST, LEVL IV, 30-39 MIN: ICD-10-PCS | Mod: S$PBB,GC,, | Performed by: INTERNAL MEDICINE

## 2023-05-17 NOTE — PROGRESS NOTES
Documentation Only/ No Show      Patient: Amna Chawla  Date of Session: 5/17/2023  Diagnosis: No diagnosis found.  MRN: [id number]  Amna Chawla did not attend his/her scheduled therapy appointment today. Amna did not call to cancel nor reschedule. This is the 3rd appointment that she has not attended. Next appointment is scheduled for 5/22/23 will follow up with patient at that time. No charges have been posted today.       Julissa Fine, PTA  05/17/2023

## 2023-05-17 NOTE — PROGRESS NOTES
PCP - Tiffany Mina MD    Subjective:   Patient ID:  Amna Chawla is a 57 y.o. y.o. female with cardiac arrest in  s/p ICD (negative cardiac CTA at that time and in subsequent follow-up she underwent a pharmacologic stress ECHO in  which showed a preserved LVEF and no ischemia.), pAF, CVA, HTN, HLD, morbid obesity (BMI 54-->52), SHIRA here for the follow up evaluation. She has seen Dr. Gaspar and Dr. Ceron in the past.     No exertional chest pain or heart failure symptoms. No palpitations or claudication. No MERCHANT, orthopnea, PND, edema, syncope. Never smoker and drinks alcohol seldomly. No family history of CAD or SCD. Father  at age 70. Most recent stressor was regarding her sister passing away unexpectedly due to MVA.      Seen in ER for hypertension causing headaches with BP >180/90 mmHg on 2022. Exacerbated after her sisters death. She is grieving and has emotional stress. She was also seen in the ER for right shoulder pain.    Has previously experienced chest pain and had a negative stress test. Does not follow have a routine exercise program. ADLs independently. Exercise tolerance is of >4 METS. No exertional angina.  She denies any chest symptoms. No heart failure, claudication or palpitations.     Here to follow up on HTN. Today her BP is under excellent controlled, 125/80 on my check. She was enrolled in the hypertension program. Her readings are much better. Headaches have improved as well. No heart failure symptoms. No angina. No claudication or palpitations.     History:     Social History     Tobacco Use    Smoking status: Never     Passive exposure: Never    Smokeless tobacco: Never   Substance Use Topics    Alcohol use: Yes     Comment: wine, socially     Family History   Problem Relation Age of Onset    Hypertension Mother     Glaucoma Maternal Grandmother     Glaucoma Paternal Grandmother     COPD Other     Heart failure Other        Meds:     Review of patient's allergies  indicates:   Allergen Reactions    Aspirin Hives    Imitrex [sumatriptan] Palpitations    Penicillins Hives and Swelling    Shellfish containing products Anaphylaxis     seafood    Reglan [metoclopramide hcl] Other (See Comments)     Parkinsonism        Current Outpatient Medications:     acetaminophen (TYLENOL) 500 MG tablet, Take 1-2 tablets (500-1,000 mg total) by mouth 3 (three) times daily as needed for Pain., Disp: , Rfl: 0    amLODIPine (NORVASC) 5 MG tablet, Take 1 tablet (5 mg total) by mouth once daily., Disp: 90 tablet, Rfl: 3    ARIPiprazole (ABILIFY) 15 MG Tab, Take 1 tablet (15 mg total) by mouth once daily., Disp: 90 tablet, Rfl: 1    blood sugar diagnostic Strp, 1 strip by Misc.(Non-Drug; Combo Route) route 2 (two) times daily., Disp: 200 strip, Rfl: 3    blood-glucose meter kit, Please provide with insurance covered meter, Disp: 1 each, Rfl: 0    carvediloL (COREG) 25 MG tablet, Take 1.5 tablets (37.5 mg total) by mouth 2 (two) times daily with meals., Disp: 90 tablet, Rfl: 11    cetirizine (ZYRTEC) 10 MG tablet, Take 1 tablet (10 mg total) by mouth once daily. for 7 days, Disp: 30 tablet, Rfl: 0    clonazePAM (KLONOPIN) 1 MG tablet, TAKE 1 TABLET BY MOUTH DAILY AS NEEDED FOR ANXIETY, Disp: 30 tablet, Rfl: 2    clopidogreL (PLAVIX) 75 mg tablet, Take 1 tablet (75 mg total) by mouth once daily., Disp: 90 tablet, Rfl: 3    cyanocobalamin, vitamin B-12, 1,000 mcg Subl, Place 1,000 mcg under the tongue once daily., Disp: 90 tablet, Rfl: 3    diclofenac sodium (VOLTAREN) 1 % Gel, Apply 2 g topically 4 (four) times daily., Disp: 100 g, Rfl: 1    donepezil (ARICEPT) 10 MG tablet, Take 1 tablet (10 mg total) by mouth 2 (two) times daily., Disp: 180 tablet, Rfl: 3    EPINEPHrine (EPIPEN) 0.3 mg/0.3 mL AtIn, Inject 0.3 mLs (0.3 mg total) into the muscle once. for 1 dose, Disp: 0.3 mL, Rfl: 0    etodolac (LODINE) 500 MG tablet, Take 1 tablet (500 mg total) by mouth 2 (two) times daily as needed (migraine).,  Disp: 30 tablet, Rfl: 12    famotidine (PEPCID) 20 MG tablet, Take 1 tablet (20 mg total) by mouth 2 (two) times daily. for 7 days, Disp: 14 tablet, Rfl: 0    flurbiprofen (ANSAID) 100 MG tablet, Take 1 tablet (100 mg total) by mouth 2 (two) times daily as needed (migraine)., Disp: 12 tablet, Rfl: 12    flurbiprofen (ANSAID) 100 MG tablet, Take 1 tablet (100 mg total) by mouth 2 (two) times daily as needed (migraine)., Disp: 12 tablet, Rfl: 12    fremanezumab-vfrm (AJOVY) 225 mg/1.5 mL injection, Inject 1.5 mLs (225 mg total) into the skin every 28 days., Disp: 1 each, Rfl: 12    furosemide (LASIX) 20 MG tablet, Take 1 tablet (20 mg total) by mouth every other day., Disp: 45 tablet, Rfl: 3    gabapentin (NEURONTIN) 300 MG capsule, Take 1 capsule (300 mg total) by mouth 3 (three) times daily., Disp: 270 capsule, Rfl: 1    hydrOXYzine HCL (ATARAX) 25 MG tablet, Take 1 tablet (25 mg total) by mouth 3 (three) times daily as needed for Itching., Disp: 20 tablet, Rfl: 0    lancets Misc, 1 Device by Misc.(Non-Drug; Combo Route) route 2 (two) times daily., Disp: 200 each, Rfl: 3    losartan (COZAAR) 100 MG tablet, Take 1 tablet (100 mg total) by mouth once daily., Disp: 90 tablet, Rfl: 3    magnesium 200 mg Tab, Take 200 mg by mouth once daily. (Patient taking differently: Take 200 mg by mouth every other day.), Disp: 30 each, Rfl: 12    omeprazole (PRILOSEC) 40 MG capsule, Take 1 capsule (40 mg total) by mouth once daily. Take 30 minutes before breakfast, Disp: 90 capsule, Rfl: 6    ondansetron (ZOFRAN-ODT) 4 MG TbDL, Take 1 tablet (4 mg total) by mouth every 12 (twelve) hours as needed (nausea)., Disp: 40 tablet, Rfl: 12    rivaroxaban (XARELTO) 20 mg Tab, TAKE 1 TABLET BY MOUTH DAILY EVENING MEAL WITH DINNER, Disp: 30 tablet, Rfl: 11    rosuvastatin (CRESTOR) 40 MG Tab, TAKE 1 TABLET(40 MG) BY MOUTH EVERY EVENING, Disp: 90 tablet, Rfl: 3    sertraline (ZOLOFT) 100 MG tablet, Take 1.5 tablets (150 mg total) by mouth once  daily., Disp: 135 tablet, Rfl: 3    silver sulfADIAZINE 1% (SILVADENE) 1 % cream, Apply topically 2 (two) times daily., Disp: 50 g, Rfl: 0    suvorexant (BELSOMRA) 10 mg Tab, Take 1 tablet by mouth nightly as needed (insomnia)., Disp: 15 tablet, Rfl: 1    tiaGABine (GABITRIL) 4 MG tablet, Take 1 tablet (4 mg total) by mouth every evening., Disp: 30 tablet, Rfl: 12    tiZANidine (ZANAFLEX) 4 MG tablet, TAKE 1 TABLET(4 MG) BY MOUTH EVERY 6 HOURS AS NEEDED, Disp: 90 tablet, Rfl: 12    traZODone (DESYREL) 100 MG tablet, Take 1 tablet (100 mg total) by mouth every evening., Disp: 30 tablet, Rfl: 11    triamcinolone acetonide 0.1% (KENALOG) 0.1 % ointment, Apply topically 2 (two) times daily., Disp: 30 g, Rfl: 1    TRUE METRIX GLUCOSE METER Misc, TEST BG BID, Disp: , Rfl: 0    ubrogepant (UBROGEPANT) 100 mg tablet, Take 1 tablet (100 mg total) by mouth 2 (two) times daily as needed for Migraine., Disp: 10 tablet, Rfl: 12    zolpidem (AMBIEN) 10 mg Tab, Take 1 tablet (10 mg total) by mouth nightly as needed (insomnia)., Disp: 30 tablet, Rfl: 5    Current Facility-Administered Medications:     ketorolac injection 60 mg, 60 mg, Intramuscular, 1 time in Clinic/HOD, David Cortez MD    ketorolac injection 60 mg, 60 mg, Intramuscular, 1 time in Clinic/HOD, David Cortez MD    onabotulinumtoxina injection 200 Units, 200 Units, Intramuscular, Q90 Days, David Cortez MD, 200 Units at 10/19/18 1713    onabotulinumtoxina injection 200 Units, 200 Units, Intramuscular, Q90 Days, David Cortez MD, 200 Units at 12/28/18 1106    onabotulinumtoxina injection 200 Units, 200 Units, Intramuscular, Q90 Days, David Cortez MD, 200 Units at 03/13/19 1504    onabotulinumtoxina injection 200 Units, 200 Units, Intramuscular, Q90 Days, David Cortez MD, 200 Units at 05/23/19 1123    onabotulinumtoxina injection 200 Units, 200 Units, Intramuscular, Q90 Days, David Cortez MD, 200 Units at 10/11/19 1112    onabotulinumtoxina injection 200  "Units, 200 Units, Intramuscular, Q90 Days, David Cortez MD, 200 Units at 12/19/19 1036    onabotulinumtoxina injection 200 Units, 200 Units, Intramuscular, Q10 weeks, David Cortez MD, 200 Units at 03/10/20 1036    onabotulinumtoxina injection 200 Units, 200 Units, Intramuscular, Q90 Days, David Cortez MD, 200 Units at 03/01/23 1017    triamcinolone acetonide injection 40 mg, 40 mg, Intramuscular, 1 time in Clinic/HOD, INOCENCIA [place]    Facility-Administered Medications Ordered in Other Visits:     lactated ringers infusion, , Intravenous, Continuous, Humberto Angel MD, Stopped at 02/11/20 0801    lidocaine (PF) 10 mg/ml (1%) injection 5 mg, 0.5 mL, Intradermal, Once, Humberto Angel MD    Review of Systems   Constitutional: Negative for chills, fever, malaise/fatigue and weight loss.   HENT: Negative for ear pain, hearing loss and tinnitus.    Respiratory: Negative for cough, hemoptysis, sputum production and shortness of breath.    Cardiovascular: Negative for chest pain, palpitations, orthopnea, claudication, leg swelling and PND.   Gastrointestinal: Negative for abdominal pain, diarrhea, heartburn, nausea and vomiting.   Genitourinary: Negative for dysuria and urgency.   Musculoskeletal: Negative for back pain, myalgias and neck pain.   Neurological: Negative for dizziness and headaches.   Psychiatric/Behavioral: Negative for depression.     Objective:   BP (!) 1240/75   Pulse 85   Ht 5' 4" (1.626 m)   Wt (!) 139.9 kg (308 lb 6.8 oz)   LMP 01/03/2016   SpO2 96%   BMI 52.94 kg/m²   Physical Exam  Constitutional:       Appearance: Normal appearance.   Cardiovascular:      Rate and Rhythm: Normal rate and regular rhythm.      Pulses: Normal pulses.           Carotid pulses are 2+ on the right side and 2+ on the left side.       Radial pulses are 2+ on the right side and 2+ on the left side.        Femoral pulses are 2+ on the right side and 2+ on the left side.       Popliteal pulses are 2+ " on the right side and 2+ on the left side.        Dorsalis pedis pulses are 2+ on the right side and 2+ on the left side.        Posterior tibial pulses are 2+ on the right side and 2+ on the left side.      Heart sounds: No murmur heard.  Pulmonary:      Effort: Pulmonary effort is normal. No respiratory distress.      Breath sounds: No stridor. No wheezing.   Abdominal:      General: Abdomen is flat. Bowel sounds are normal. There is no distension.      Palpations: Abdomen is soft.   Musculoskeletal:         General: No swelling. Normal range of motion.   Skin:     General: Skin is warm and dry.      Capillary Refill: Capillary refill takes less than 2 seconds.   Neurological:      General: No focal deficit present.      Mental Status: She is alert and oriented to person, place, and time.       Labs:     Lab Results   Component Value Date     05/05/2023    K 3.8 05/05/2023     05/05/2023    CO2 24 05/05/2023    BUN 7 05/05/2023    CREATININE 0.8 05/05/2023    ANIONGAP 5 (L) 05/05/2023     Lab Results   Component Value Date    HGBA1C 6.2 (H) 05/05/2023     Lab Results   Component Value Date    BNP <10 12/10/2021    BNP 17 11/01/2020    BNP 11 02/02/2014       Lab Results   Component Value Date    WBC 2.74 (L) 05/05/2023    HGB 10.4 (L) 05/05/2023    HCT 32.7 (L) 05/05/2023    HCT 27 (L) 05/17/2022     05/05/2023    GRAN 1.3 (L) 05/05/2023    GRAN 46.3 05/05/2023     Lab Results   Component Value Date    CHOL 97 (L) 05/05/2023    HDL 31 (L) 05/05/2023    LDLCALC 52.6 (L) 05/05/2023    TRIG 67 05/05/2023       Lab Results   Component Value Date     05/05/2023    K 3.8 05/05/2023     05/05/2023    CO2 24 05/05/2023    BUN 7 05/05/2023    CREATININE 0.8 05/05/2023    ANIONGAP 5 (L) 05/05/2023     Lab Results   Component Value Date    HGBA1C 6.2 (H) 05/05/2023     Lab Results   Component Value Date    BNP <10 12/10/2021    BNP 17 11/01/2020    BNP 11 02/02/2014    Lab Results   Component  Value Date    WBC 2.74 (L) 05/05/2023    HGB 10.4 (L) 05/05/2023    HCT 32.7 (L) 05/05/2023    HCT 27 (L) 05/17/2022     05/05/2023    GRAN 1.3 (L) 05/05/2023    GRAN 46.3 05/05/2023     Lab Results   Component Value Date    CHOL 97 (L) 05/05/2023    HDL 31 (L) 05/05/2023    LDLCALC 52.6 (L) 05/05/2023    TRIG 67 05/05/2023                Cardiovascular Imaging:     Echo:   EF   Date Value Ref Range Status   03/22/2023 50 % Final   12/27/2021 58 % Final     Nuc Stress EF   Date Value Ref Range Status   02/08/2022 55 % Final     Nuc Rest EF   Date Value Ref Range Status   02/08/2022 53  Final       PET stress test 2022:     The relative PET images show no clinically significant regional resting or stress induced perfusion abnormalities.    The whole heart absolute myocardial perfusion values averaged 1.02 cc/min/g at rest, which is elevated; 2.21 cc/min/g at stress, which is normal; and CFR is 2.19 , which is mildly reduced in part due to elevated resting flow.    CT attenuation images demonstrate no coronary calcifications and no aortic calcifications.    The gated perfusion images showed an ejection fraction of 53% at rest and 55% during stress. A normal ejection fraction is greater than 53%.    The wall motion is normal at rest and during stress.    The LV cavity size is normal at rest and stress.    The EKG portion of this study is uninterpretable.    There were no arrhythmias during stress.    The patient reported no chest pain during the stress test.    When compared to the previous study from 9/15/2020, myocardial stress flows and CFR are much improved on the current study and similar to her study in 2017.    Assessment & Plan:     1. Nonischemic cardiomyopathy from RV pacing, resolved with upgrade cardiac resynchronization therapy    2. Essential hypertension    3. Paroxysmal atrial fibrillation    4. Biventricular automatic implantable cardioverter defibrillator in situ    5. History of sudden cardiac  arrest    6. Mixed hyperlipidemia    7. Pacemaker    8. Complete AV block        HTN - Controlled  Has had prior ER visit for symptomatic HTN but has not had one since our last clinic visit  Enrolled in digital hypertension program   Continue Losartan, Coreg and Amlodipine     Non-cardiac chest pain   Secondary to shoulder pain. She was referred for steroid injections.   Has a negative PET stress test 5/2022.   No exertional symptoms.   Encouraged to increase activities by walking.    PAF  Continue Carvedilol, Rivaroxaban  Her ICD is operating normally per last interrogation.  Managed by EP     Grief - better   Continue with healthy lifestyle habits.   Seeing a psychiatrics and will see a grief specialist.    HLD  LDL 52 (5/2023)      SHIRA  CPAP recalled. Referral already sent to sleep medicine who recommended to continue using her CPAP until [facility] contacts her for device exchange. She is still waiting for them to send her a new machine.     Morbid obesity  She has started to loose wt when compared to our prior visit  We discussed diet and lifestyle modification   She watches her diet. LDL near goal on medications  She wants to control her wt but is difficult.  She is being referred to the bariatric medicine team    History of CVA  Continue Plavix per neurology (can be held for EGD)  Renewed prescription    RTC 1 yr    Signed:  Alok Grossman M.D.  Cardiovascular Fellow  [facility]

## 2023-05-17 NOTE — PROGRESS NOTES
I have reviewed the notes, assessments, and/or procedures performed by fellow, I concur with her/his documentation of Amna Chawla.

## 2023-05-24 ENCOUNTER — DOCUMENTATION ONLY (OUTPATIENT)
Dept: REHABILITATION | Facility: HOSPITAL | Age: 58
End: 2023-05-24
Payer: MEDICARE

## 2023-05-24 NOTE — PROGRESS NOTES
Documentation Only/ No Show      Patient: Amna Chawla  Date of Session: 5/24/2023  Diagnosis: No diagnosis found.  MRN: [id number]  Amna Chawla did not attend his/her scheduled therapy appointment today. Amna did not call to cancel nor reschedule. This is the 4th appointment that she has not attended. Next appointment is scheduled for 5/31/23 will follow up with patient at that time. No charges have been posted today.       Julissa Fine, PTA  05/24/2023

## 2023-05-31 ENCOUNTER — EXTERNAL CHRONIC CARE MANAGEMENT (OUTPATIENT)
Dept: PRIMARY CARE CLINIC | Facility: CLINIC | Age: 58
End: 2023-05-31
Payer: MEDICARE

## 2023-05-31 PROCEDURE — 99490 PR CHRONIC CARE MGMT, 1ST 20 MIN: ICD-10-PCS | Mod: S$PBB,,, | Performed by: INTERNAL MEDICINE

## 2023-05-31 PROCEDURE — 99490 CHRNC CARE MGMT STAFF 1ST 20: CPT | Mod: PBBFAC | Performed by: INTERNAL MEDICINE

## 2023-05-31 PROCEDURE — 99490 CHRNC CARE MGMT STAFF 1ST 20: CPT | Mod: S$PBB,,, | Performed by: INTERNAL MEDICINE

## 2023-06-02 ENCOUNTER — TELEPHONE (OUTPATIENT)
Dept: REHABILITATION | Facility: HOSPITAL | Age: 58
End: 2023-06-02
Payer: MEDICARE

## 2023-06-02 NOTE — TELEPHONE ENCOUNTER
PT called pt to let her know that she will be discharged from therapy at this time. Pt did not show for today's appointment and has not been in the clinic since 5/8/23. The last time PT spoke with pt she indicated she was having migraine headaches, and these were preventing her attendance. PT will discharge pt formally in a documentation only note.    Toby Ambrosio, PT  6/2/2023

## 2023-06-05 ENCOUNTER — DOCUMENTATION ONLY (OUTPATIENT)
Dept: REHABILITATION | Facility: HOSPITAL | Age: 58
End: 2023-06-05
Payer: MEDICARE

## 2023-06-05 NOTE — PROGRESS NOTES
OUTPATIENT PHYSICAL THERAPY DISCHARGE SUMMARY     Name: Amna Chawla  [facility] Number: [id number]    Diagnosis:   R26.81 (ICD-10-CM) - Gait instability   I69.354 (ICD-10-CM) - Hemiparesis affecting left side as late effect of stroke   M54.41,M54.42,G89.29 (ICD-10-CM) - Chronic midline low back pain with bilateral sciatica     Physician: Hugo Borden MD  Treatment Orders: PT Eval and Treat  Past Medical History:   Diagnosis Date    Anticoagulant long-term use     Anxiety     Asthma     Atrial fibrillation     Brain anoxic injury     Cervicalgia 8/28/2014    CHI (closed head injury) 2/19/2013    Convulsion 5/30/2015    Decreased ROM of left shoulder 4/12/2017    Defibrillator activation 2013    Depression     Heart block     History of sudden cardiac arrest 2/2013    PEA arrest with subsequent long-QT    Hx of psychiatric care     Hypertension     Left atrial enlargement 4/11/2018    Pacemaker 2013    Paresthesia 11/1/2013    Prolonged Q-T interval on ECG 2/8/2013    Psychiatric problem     Seizures     Sleep difficulties     Stroke     weakness lt side    Therapy     Thyroid disease     Upper airway resistance syndrome 2/21/2017       Initial visit: 3/7/23  Date of Last visit: 5/8/23  Date of Discharge Note:  6/5/23  Total Visits Received: 8  Missed Visits: multiple cancels and no-shows  ASSESSMENT   Status Towards Goals Met:       The below represents pt's most recent goal achievement as of her final visit on 5/8/23: Pt met 3 of 4 STGs and 4 of 8 LTGs.      Goals:  Short Term Goals: 8 weeks   Pt will be at least partially independent with initial installment of HEP ~ MET, 4/13/23  Pt will improve her self- selected walking speed to 0.86 m/sec, demonstrating increased community ambulation~MET, 4/13/23, NOT MET, 5/1/23  Pt will improve her FGA score to 13/30, demonstrating increased safety with dynamic gait activities~MET, 4/13/23  Pt will improve her 5 times sit to stand score to 14 seconds, indicating  increased functional LE strength~ MET, 23     Long Term Goals: 8 weeks   Pt will report </= 41% limitation using FOTO assessment tool in order to demonstrate improved perception of abilities~MET, 23  Pt will improve her self- selected walking speed to 1.0 m/sec , demonstrating increased community ambulation.Ongoing  Pt will improve her fast- walking speed score to 1.2 m/sec, demonstrating increased safety with changes in gait speed during community ambulation~MET, 23  Pt will improve her FGA score to 15/30, demonstrating increased safety with dynamic gait activities~MET, 23~ revise this goal to ~ revise again to , 23  Pt will improve her 5 times sit to stand score to 12 seconds, indicating increased functional LE strength~progressing, 23  Pt will be independent with advanced HEP to help maintain potential gains realized in PT. Ongoing  Pt will ascend and descend 12 steps with use of rails while exhibiting reciprocal pattern in each direction~MET, 23~ revise to no use of rails, remains current, 23  (NEW GOAL, 23): pt to improve her B SLS scores by 2-3 seconds each for overall decrease in fall risk      Goals Not achieved and why:     Remaining goals not met due to sporadic attendance. Pt often was unable to attend sessions due to transportation difficulty. Most recently, she was unable to attend due to migraine headaches.         Discharge reason : Non-Compliance with attendance and POC has     PLAN   This patient is discharged from Outpatient Physical Therapy Services.     Toby Ambrosio, PT  2023

## 2023-06-15 ENCOUNTER — OFFICE VISIT (OUTPATIENT)
Dept: PSYCHIATRY | Facility: CLINIC | Age: 58
End: 2023-06-15
Payer: MEDICARE

## 2023-06-15 VITALS
WEIGHT: 293 LBS | BODY MASS INDEX: 52.58 KG/M2 | DIASTOLIC BLOOD PRESSURE: 68 MMHG | SYSTOLIC BLOOD PRESSURE: 122 MMHG | HEART RATE: 83 BPM

## 2023-06-15 DIAGNOSIS — F33.1 DEPRESSION, MAJOR, RECURRENT, MODERATE: ICD-10-CM

## 2023-06-15 DIAGNOSIS — F43.21 GRIEF: Primary | ICD-10-CM

## 2023-06-15 DIAGNOSIS — F41.9 ANXIETY: ICD-10-CM

## 2023-06-15 DIAGNOSIS — G47.00 INSOMNIA, UNSPECIFIED TYPE: ICD-10-CM

## 2023-06-15 PROCEDURE — 99999 PR PBB SHADOW E&M-EST. PATIENT-LVL II: CPT | Mod: PBBFAC,,, | Performed by: NURSE PRACTITIONER

## 2023-06-15 PROCEDURE — 99214 PR OFFICE/OUTPT VISIT, EST, LEVL IV, 30-39 MIN: ICD-10-PCS | Mod: S$PBB,,, | Performed by: NURSE PRACTITIONER

## 2023-06-15 PROCEDURE — 90833 PR PSYCHOTHERAPY W/PATIENT W/E&M, 30 MIN (ADD ON): ICD-10-PCS | Mod: ,,, | Performed by: NURSE PRACTITIONER

## 2023-06-15 PROCEDURE — 99999 PR PBB SHADOW E&M-EST. PATIENT-LVL II: ICD-10-PCS | Mod: PBBFAC,,, | Performed by: NURSE PRACTITIONER

## 2023-06-15 PROCEDURE — 90833 PSYTX W PT W E/M 30 MIN: CPT | Mod: ,,, | Performed by: NURSE PRACTITIONER

## 2023-06-15 PROCEDURE — 99212 OFFICE O/P EST SF 10 MIN: CPT | Mod: PBBFAC | Performed by: NURSE PRACTITIONER

## 2023-06-15 PROCEDURE — 99214 OFFICE O/P EST MOD 30 MIN: CPT | Mod: S$PBB,,, | Performed by: NURSE PRACTITIONER

## 2023-06-15 RX ORDER — SERTRALINE HYDROCHLORIDE 100 MG/1
200 TABLET, FILM COATED ORAL DAILY
Qty: 180 TABLET | Refills: 3 | Status: SHIPPED | OUTPATIENT
Start: 2023-06-15

## 2023-06-15 RX ORDER — ARIPIPRAZOLE 15 MG/1
15 TABLET ORAL DAILY
Qty: 90 TABLET | Refills: 3 | Status: SHIPPED | OUTPATIENT
Start: 2023-06-15

## 2023-06-15 RX ORDER — CLONAZEPAM 1 MG/1
TABLET ORAL
Qty: 30 TABLET | Refills: 5 | Status: SHIPPED | OUTPATIENT
Start: 2023-06-15

## 2023-06-15 RX ORDER — ZOLPIDEM TARTRATE 10 MG/1
10 TABLET ORAL NIGHTLY PRN
Qty: 30 TABLET | Refills: 5 | Status: SHIPPED | OUTPATIENT
Start: 2023-06-15 | End: 2023-10-18

## 2023-06-15 NOTE — PROGRESS NOTES
Outpatient Psychiatry Follow-Up Visit (MD/NP)    6/15/2023    Clinical Status of Patient:  Outpatient (Ambulatory)    Chief Complaint:  Amna Chawla is a 57 y.o. female who presents today for follow-up of depression and anxiety.  Met with patient.      Last Visit:  was on 22. Chart and  reviewed.     Current Medication Profile for Psych  Interval History and Content of Current Session:  Increase to Zoloft 150 mg  daily   Continue Abilify 15 mg po daily  Continue Ambien 10 mg po q sh PRN insomnia (Trazodone and  Zaleplon not effective)  Continue Klonopin 1 mg po daily PRN severe anxiety  Restart Trazodone 100 mg po qhs      Reports unresolved grief from her sister  in drunk driving MVA.  Also worries about her other sister who is caregiver for her  with dementia.  Reports depression feels worsened. Reports elevated stress with ineffective coping of anxiety. Will increase Zoloft and encourage pt to set therapy appointment with Dr. Monroe for grief counseling. Reports compliance with medications and denies side effects. Reports Ambien has been helpful for sleep but ran out of refills. Denies SI/HI/AVH.     Psychotherapy:  Target symptoms: depression, anxiety   Why chosen therapy is appropriate versus another modality: relevant to diagnosis  Outcome monitoring methods: self-report, observation  Therapeutic intervention type: insight oriented psychotherapy, CBT  Topics discussed/themes: relationships difficulties, parenting issues, stress related to medical comorbidities, difficulty managing affect in interpersonal relationships, building skills sets for symptom management, symptom recognition  The patient's response to the intervention is accepting. The patient's progress toward treatment goals is not progressing.   Duration of intervention: 20 minutes.    Review of Systems   PSYCHIATRIC: Pertinant items are noted in the narrative.  CONSTITUTIONAL: No weight gain or loss.   ENDOCRINE: No  polydipsia or polyuria.  INTEGUMENTARY: No rashes or lacerations.  EYES: No exophthalmos, jaundice or blindness.  ENT: No dizziness, tinnitus or hearing loss.  RESPIRATORY: No shortness of breath.  GASTROINTESTINAL: No nausea, vomiting, pain, constipation or diarrhea.  GENITOURINARY: No frequency, dysuria or sexual dysfunction.  HEMATOLOGIC/LYMPHATIC: No excessive bleeding, prolonged or excessive bleeding after dental extraction/injury.  ALLERGIC/IMMUNOLOGIC: No allergic response to materials, foods or animals at this time.    Past Medical, Family and Social History: The patient's past medical, family and social history have been reviewed and updated as appropriate within the electronic medical record - see encounter notes.    Compliance: yes    Side effects: None    Risk Parameters:  Patient reports no suicidal ideation  Patient reports no homicidal ideation  Patient reports no self-injurious behavior  Patient reports no violent behavior    Exam (detailed: at least 9 elements; comprehensive: all 15 elements)   Constitutional  Vitals:  Most recent vital signs, dated less than 90 days prior to this appointment, were reviewed.   Vitals:    06/15/23 0840   BP: 122/68   Pulse: 83   Weight: (!) 139 kg (306 lb 5.3 oz)        General:  NA     Musculoskeletal  Muscle Strength/Tone:  not examined   Gait & Station:  NA     Psychiatric  Speech:  no latency; no press   Mood & Affect:  dysthymic, sad  irritable   Thought Process:  normal and logical   Associations:  intact   Thought Content:  normal, no suicidality, no homicidality, delusions, or paranoia   Insight:  has awareness of illness   Judgement: behavior is adequate to circumstances, age appropriate   Orientation:  grossly intact, person, place, situation, time/date   Memory: intact for content of interview, able to remember recent events- yes, able to remember remote events- yes   Language: grossly intact   Attention Span & Concentration:  able to focus   Fund of  Knowledge:  intact and appropriate to age and level of education, familiar with aspects of current personal life     Assessment and Diagnosis   Status/Progress: Based on the examination today, the patient's problem(s) is/are worsening.  New problems have been presented today (grief).   Co-morbidities and Lack of compliance are not complicating management of the primary condition.  There are no active rule-out diagnoses for this patient at this time.     General Impression:       ICD-10-CM ICD-9-CM   1. Grief  F43.21 309.0   2. Depression, major, recurrent, moderate  F33.1 296.32   3. Anxiety  F41.9 300.00   4. Insomnia, unspecified type  G47.00 780.52     Intervention/Counseling/Treatment Plan   Medication Management: The risks and benefits of medication were discussed with the patient.   Increase to Zoloft 200 mg  daily   Continue Abilify 15 mg po daily  Continue Ambien 10 mg po q sh PRN insomnia (Trazodone and  Zaleplon not effective)  Continue Klonopin 1 mg po daily PRN severe anxiety  Set appointment with Dr. Monroe for grief counseling    Return to Clinic: 6 months     Risks, benefits, side effects and alternative treatments discussed with patient. Patient agrees with the current plan as documented.  Encouraged Patient to keep future appointments.  Take medications as prescribed and abstain from substance abuse.  Pt to present to ED for thoughts to harm herself or others

## 2023-06-15 NOTE — PATIENT INSTRUCTIONS
Increase to Zoloft 200 mg  daily   Continue Abilify 15 mg  daily  Continue Ambien 10 mg before bed as needed for insomnia   Continue Klonopin 1 mg  daily as needed for severe anxiety  Set appointment with Dr. Monroe for grief counseling

## 2023-06-16 ENCOUNTER — PATIENT MESSAGE (OUTPATIENT)
Dept: ADMINISTRATIVE | Facility: OTHER | Age: 58
End: 2023-06-16
Payer: MEDICARE

## 2023-06-20 ENCOUNTER — PES CALL (OUTPATIENT)
Dept: ADMINISTRATIVE | Facility: CLINIC | Age: 58
End: 2023-06-20
Payer: MEDICARE

## 2023-06-20 ENCOUNTER — PATIENT OUTREACH (OUTPATIENT)
Dept: ADMINISTRATIVE | Facility: HOSPITAL | Age: 58
End: 2023-06-20
Payer: MEDICARE

## 2023-06-20 NOTE — PROGRESS NOTES
Health Maintenance Due   Topic Date Due    COVID-19 Vaccine (4 - Moderna series) 03/07/2022    Mammogram  12/02/2022     Triggered LINKS. Updated Care Everywhere. Pt has mammogram appt scheduled for 6/23/2023. Checked pt's Hypertension Digital Medicine flow sheet for an updated BP reading; no new BP readings recorded in flow sheet. Chart review completed.

## 2023-06-23 ENCOUNTER — HOSPITAL ENCOUNTER (OUTPATIENT)
Dept: RADIOLOGY | Facility: HOSPITAL | Age: 58
Discharge: HOME | End: 2023-06-23
Attending: INTERNAL MEDICINE
Payer: MEDICARE

## 2023-06-23 DIAGNOSIS — Z12.31 SCREENING MAMMOGRAM, ENCOUNTER FOR: ICD-10-CM

## 2023-06-23 PROCEDURE — 77063 BREAST TOMOSYNTHESIS BI: CPT | Mod: 26,,, | Performed by: RADIOLOGY

## 2023-06-23 PROCEDURE — 77067 SCR MAMMO BI INCL CAD: CPT | Mod: 26,,, | Performed by: RADIOLOGY

## 2023-06-23 PROCEDURE — 77067 MAMMO DIGITAL SCREENING BILAT WITH TOMO: ICD-10-PCS | Mod: 26,,, | Performed by: RADIOLOGY

## 2023-06-23 PROCEDURE — 77067 SCR MAMMO BI INCL CAD: CPT | Mod: TC

## 2023-06-23 PROCEDURE — 77063 MAMMO DIGITAL SCREENING BILAT WITH TOMO: ICD-10-PCS | Mod: 26,,, | Performed by: RADIOLOGY

## 2023-06-30 ENCOUNTER — EXTERNAL CHRONIC CARE MANAGEMENT (OUTPATIENT)
Dept: PRIMARY CARE CLINIC | Facility: CLINIC | Age: 58
End: 2023-06-30
Payer: MEDICARE

## 2023-06-30 PROCEDURE — 99490 PR CHRONIC CARE MGMT, 1ST 20 MIN: ICD-10-PCS | Mod: S$PBB,,, | Performed by: INTERNAL MEDICINE

## 2023-06-30 PROCEDURE — 99490 CHRNC CARE MGMT STAFF 1ST 20: CPT | Mod: S$PBB,,, | Performed by: INTERNAL MEDICINE

## 2023-06-30 PROCEDURE — 99490 CHRNC CARE MGMT STAFF 1ST 20: CPT | Mod: PBBFAC | Performed by: INTERNAL MEDICINE

## 2023-07-05 ENCOUNTER — TELEPHONE (OUTPATIENT)
Dept: INTERNAL MEDICINE | Facility: CLINIC | Age: 58
End: 2023-07-05
Payer: MEDICARE

## 2023-07-06 ENCOUNTER — CLINICAL SUPPORT (OUTPATIENT)
Dept: CARDIOLOGY | Facility: HOSPITAL | Age: 58
End: 2023-07-06
Payer: MEDICARE

## 2023-07-06 ENCOUNTER — PROCEDURE VISIT (OUTPATIENT)
Dept: NEUROLOGY | Facility: CLINIC | Age: 58
End: 2023-07-06
Payer: MEDICARE

## 2023-07-06 VITALS
SYSTOLIC BLOOD PRESSURE: 129 MMHG | BODY MASS INDEX: 50.02 KG/M2 | HEIGHT: 64 IN | WEIGHT: 293 LBS | DIASTOLIC BLOOD PRESSURE: 76 MMHG | HEART RATE: 59 BPM

## 2023-07-06 DIAGNOSIS — Z95.810 PRESENCE OF AUTOMATIC (IMPLANTABLE) CARDIAC DEFIBRILLATOR: ICD-10-CM

## 2023-07-06 DIAGNOSIS — G43.711 CHRONIC MIGRAINE WITHOUT AURA, WITH INTRACTABLE MIGRAINE, SO STATED, WITH STATUS MIGRAINOSUS: Primary | ICD-10-CM

## 2023-07-06 DIAGNOSIS — F51.01 PRIMARY INSOMNIA: ICD-10-CM

## 2023-07-06 PROCEDURE — 64615 PR CHEMODENERVATION OF MUSCLE FOR CHRONIC MIGRAINE: ICD-10-PCS | Mod: S$PBB,,, | Performed by: PSYCHIATRY & NEUROLOGY

## 2023-07-06 PROCEDURE — 64615 CHEMODENERV MUSC MIGRAINE: CPT | Mod: PBBFAC | Performed by: PSYCHIATRY & NEUROLOGY

## 2023-07-06 PROCEDURE — 64615 CHEMODENERV MUSC MIGRAINE: CPT | Mod: S$PBB,,, | Performed by: PSYCHIATRY & NEUROLOGY

## 2023-07-06 RX ORDER — SUVOREXANT 10 MG/1
1 TABLET, FILM COATED ORAL NIGHTLY PRN
Qty: 30 TABLET | Refills: 5 | Status: SHIPPED | OUTPATIENT
Start: 2023-07-06

## 2023-07-06 RX ORDER — POLYETHYLENE GLYCOL 3350 17 G/17G
17 POWDER, FOR SOLUTION ORAL DAILY
Qty: 20 EACH | Refills: 12 | Status: SHIPPED | OUTPATIENT
Start: 2023-07-06

## 2023-07-06 RX ADMIN — ONABOTULINUMTOXINA 200 UNITS: 100 INJECTION, POWDER, LYOPHILIZED, FOR SOLUTION INTRADERMAL; INTRAMUSCULAR at 12:07

## 2023-07-06 NOTE — PROCEDURES
Follow up:  Migraines are stable     Prior note:   Reports worsening LBP after COVID in Jan   On antibiotics for bronchitis     Prior note:   Reports nausea   Knees buckle frequently   No benefit from new shoes   Not Using cane     Prior note:   Reports overall worse HA   Feels tremors in whole body lasting 3-4 hrs   Tried ativan - helped for a few hours     Prior note:   She is grappling with grief of loss of her sister   Pending grief counseling      Prior note:   S/p course of prednisone for GI allergic reaction (scratch throat - seafood related)     Prior note:   Migraine Hz increased during storm - almost daily   One episode of lightheadedness. No SOB, CP       Prior note:   Reports episode of lightheadedness 2 days ago      Prior note:   S/p COVID vaccine      Prior note:   Reports more physical fatigue   taking 2 naps a day   L sided severe migraine lasted 2 days. Took ubrelvy, motrin, zanaflex      Prior note:   3 days of severe L sided HA + nausea   Gradual onset   Ubrelvy, zanaflex, flurbiprofen - not helping   Vision - nml      Prior note:   HA stable   Try Ubrelvy again      Prior note:   independent left and right parietal ice prick HA      Prior note:   HA much improved   Only 2 since last visit      Prior note:   HA are more frequent   Taking 1600 mg motrin + zanaflex   Went to ER recently      Prior note:   HA overall stable in Hz and intensity   Reports a wearing off effect of BOTOX since last week   Taking zanaflex 8 mg TID        Prior note:   HA started 12/24   She feels BOTOX wore off last week   Reports GI distress from taking to many motrin      Prior note:   4/week HA - L vertex   Jabs - vertex either sides      She reports migraine that woke her up in the morning - associated with L side facial droop      Prior note:   She gets acceptable relief for 2.5 months after BOTOX      Prior note:   No recurrent stroke symptoms   starting having whole body tremors since this AM. Took 1 tablet of  lorazepam   Last night had a HA, took trazodone       Admit Date: 4/11/2018  2:03 PM   Discharge Date and Time: 4/12/2018  8:30 PM   History of Present Illness: Ms. Chawla is a 53 yo F with afib on Eliquis (last dose this am), prior cardiac arrest with associated acute ischemic stroke with residual mild L sided weakness, CAD with ICD in situ, HTN, and HLD who presented with acute R sided weakness and sensation changes.  She originally called EMS for palpitations, but developed acute right sided facial droop and RUE weakness at 1:40pm today, after EMS had arrived.  She denies changes to speech or vision.  SBP en route 200/100.  She is ineligible for tPA 2/2 Eliquis use.  She is not suspected to have an LVO.  She will be admitted to [facility] for observation overnight.     Hospital Course (synopsis of major diagnoses, care, treatment, and services provided during the course of the hospital stay): Ms. Chawla was admitted for acute stroke workup. Pt with pacemaker so unable to obtain MRI Brain; CTA H/N demonstrated no evidence acute infarction, though did exhibit noncalcified plaquing of carotid bifurcations and proximal ICAs with < 50% stenosis, so Plavix was added to pt's daily home regimen. Concerned for TIA at this time though Migraine very high on differential due to pt's hx headaches, including presently this admission. Hypertensive urgency/emergency also considered due to pt's very elevated levels with EMS. Per chart review, Eliquis was a new medication since 4/3/18 (had switched from Coumadin), however staff Faraz concerned that pt had a potential event while on Eliquis. She wished to switch to Pradaxa as an alternative DOAC (as it has an option for reversal), however changed to Xarelto per pt's insurance coverage.   While admitted, pt given cocktail for HA which she has received previously at the infusion clinic - IV Magnesium, IM Toradol, 2mg IV Morphine, 500cc NS bolus (IV Benadryl not included this time as pt  had received a dose earlier that day prior to contrast imaging.) HA improved somewhat and pt was encouraged to follow up with Dr. Cortez for continued management of botox injections, etc. At that time, pt also found with hyponatremia; d/c'd Clorthalidone and plan was made for pt to [facility]16[facility]18 for repeat CMP to evaluate Na level and BP check since discontinuing this medication.  Ms. Chawla was evaluated and treated by PT, OT, and SLP who recommend d/c with outpatient PT and OT. She was discharged home 4/12/18 with [facility] follow-up Monday      Prior note:   2 weeks ago BOTOX effect wore off   Used up all her percocet   3 wks h/o:   Reports frequent falls - falling forward, no LOC, no injuries, able to protect falls by hands   triggers - unknown   Also occ stumbling   Dragging L foot   No Pain in back or legs   No numbness or weakness in legs   No B/B incontinence   No lightheadedness      Prior note:    Flare up of TMJ and HA during daughter's wedding   NSAIDS helped   2 weeks ago BOTOX effect wore off      Prior note:   2 weeks ago BOTOX effect wore off   Has severe spasm and pain in the traps catalina   Weather change has provoked severe migraine + nausea   She started coumadin       Prior note:   3 weeks ago BOTOX effect wore off   She reports severe daily HA    During BOTOX periods she feels she  her HA. Much less intense      Prior note:   The patient is a 51-year-old female who [facility]. Since her last visit, she reports growing frustration   about the fact that nothing has helped her with regards to her headaches. She   continues to experience daily headaches. She takes mefenamic acid and   tizanidine every night, which only provides her two hours of relief. She is   unable to sleep because of the refractory headaches. She is incapacitated   because of severe headaches. She reports minimal relief with infusions  "that she  gets on a periodic basis. She does report an improvement overall with the   Botox. However, this effect has worn off in the last two weeks. She also   reports an episode wherein she fell with loss of consciousness, which was not   provoked. As a result, she injured her ankle and is currently wearing a boot.      Prior note:   since last week - Reports vertigo for 6 - 7 minutes upto 3 times daily   Nearly daily severe HA - no response to ponstel , compazine   She is late for her BOTOX - last 2 weeks were painful       Follow up:   R sided Headache is constant max at TMJ on R   Prior note:   She reports new episodes of R face tingling. Sh also reports 2 episodes of blurred vision lasting 15 minutes. These hapended while watching TV. Her pain remains unchanged   Follow up:   2 days of severe HA during Thanksgiving   Pounding bifrontal region   Pain worse over L eye brow   Follow up:   Reports bifrontal severe HA (worse on L) with no nausea with sudden onset . Occurs daily   NO note:  I found no papilledema or other changes to suggest elevated intracranial pressure or other alternative etiology for her headaches. Repeat exam in two years.  HA are less frequent and less intense.   (R levator scapula spasm)   symptoms better with lateral flexion to L   Prior note:   She still has daily HA with severe sharp stabbing pain behind L eye lasting for 15 sec   Prior note:   Daily pain. 2/week - nausea.  Shu Lares RN at 9/17/2014 4:53 PM  Pt presented to clinic for medical advice. Pt is seen by Dr. Paredes and Dr. Cortez.  1) She went ER on 9/13/14 for a migraine. She was given Reglan, Benadryl, MSO4 and IVF. A couple days later she developed an all over body "tremor". She states "I think I have Parkinson's". - Per Dr. Paredes, medication related tremor (Reglan & Benadryl). Informed her it should wear off in a few days. If not, she is to call and let us know.  2) Headaches - triggered by insomnia.   Current " slurred speech "meds:  Ketoprofen - she took it once and said her "throat closed up" and she's not supposed to have anything with aspirin in it.  Nortriptyline - she was taking it at night, but it didn't help her sleep, so she stopped it.  Per Dr. Coretz, discontinue Ketoprofen and Nortriptyline. Start Effexor XR 37.5mg QD, tizanidine 4mg BID PRN headache and Klonopin 0.25 - 0.5mg QHS PRN. Will schedule pt for sphenocath procedure within the next week.   Prior note:   46 yo woman with history of HTN and asthma, both reportedly controlled, in hospital early 2013 after "passed out" and became unresponsive. CPR in the field for 8 minutes until EMS arrived. Per ED note, on arrival she was found to be in PEA, given epinephrine. Vfib/Vtach was achieved which was shocked x1. Patient converted to sinus tachycardia after shock. I do not know the time course for the above treatment. On arrival to facility patient was in sinus tachycardia with strong pulses, intubated and unresponsive. ET tube placement was confirmed with direct laryngoscopy and good bilateral breath sounds were heard after the tube was pulled back 2 cm. Reported to have "withdrawal" movements in ER during stabilization, then sedated and cooling protocol initiated. Had remarkable   recovery and first seen in clinic in April 2013.   Headache history: cluster   Age of onset - 2012   Location - behind L eye   Nature of pain - sharp   Prodrome - no   Aura - No   Duration of headache - 6-7 min   Time to peak intensity -   Pain scale - range of intensity - 9/10   Frequency - daily   Status Migrainosus history - 1-3 days   Time of day of most headaches- anytime   Associated symptoms with the headache:   Red eyes   swelling of L face   Headache history: Migraine   Age of onset - 2012   Location - bifrontal   Nature of pain - Pounding   Prodrome - no   Aura - No   Duration of headache - > 4 hrs   Time to peak intensity -   Pain scale - range of intensity - 9/10   Frequency - 5/week " "  Status Migrainosus history - 1-3 days   Time of day of most headaches- anytime   Associated symptoms with the headache:   Meningeal symptoms - photophobia, phonophobia, exercise intolerance +   Nausea/vomitting +   Nasal drainage   Visual blurriness +   Pallor/flushing   Dizziness   Vertigo   Confusion   Impaired concentration +   Pain worsened with physical activity +   Neck pain +   Symptoms of increased intracranial pressure:   Whooshing sounds - no   Visual spots/blotches - no   Headache Triggers: unknown   Other comorbid conditions:   Anxiety - no   Motion sickness symptom - no       Treatment history:   Resolution of headache with sleep - yes       Meds tried:   Trigger points involvin/9/15 helped for 1 day   Site: Right   Levator scapula   Pharmacological agent:   0.5% Sensorcaine solution   No complications were noted.  Supraorbital block - helped for 2 days   Tylenol - not help   imitrex - chest tightness   alleve - help   topamax - not helping   Neurontin - not helping   Paxil, Elavil - not help   seroquel - nightmare   Ketoprofen - she took it once and said her "throat closed up" and she's not supposed to have anything with aspirin in it.  Flurbiprofen - constipation   Nortriptyline - she was taking it at night, but it didn't help her sleep, so she stopped it.  reglan - parkinsonism   zanaflex - help   Toradol 30 mg IM inj at home - too expensive   BOTOX round #1 9/3/15 - helped (R levator scapula spasm)   Round #2 12/3/15 - helped   Round#3 3/316 - helped   Round #4 16 - helped   BOTOX#5 9/15/16 - helped     BOTOX#6 - 16 - helped   BOTOX#7 - 3/9/17 - helped    BOTOX#8 - 17 - helped    BOTOX#9 8/10/17 - helped   BOTOX#10 10/19/17 - helped   BOTOX#11 17 - helped   BOTOX#12 3/7/18 - helped   BOTOX#13 18 - helped    BOTOX#14 18 - helped     BOTOX#15 10/19/18 - helped      BOTOX#16 18 - helped   BOTOX#17 3/13/19 - helped    BOTOX#18 19 - helped     BOTOX#19 " 8/1/19 - helped      BOTOX#20 10/11/19 - helped     BOTOX#21 12/19/19 - helped   BOTOX#22 3/10/20 - helped    BOTOX#23 6/26/20 - helped   BOTOX#24 11/12/20 - helped  BOTOX#25 1/21/21 - helped   BOTOX#26 4/22/21 - helped   BOTOX#27 7/6/21 - helped    BOTOX#28 12/10/21 - helped     BOTOX#29 5/19/22 - helped   BOTOX#30 8/25/22 - helped  BOTOX#31 12/2/22 - helped  BOTOX#32 3/1/23 - helped    AIMOVIG (sept 1rst week, Oct first week, Nov first wk 140 mg, Dec first wk 140 mg, Jan, Feb) no benefit   Constipation +      Can not do RFA - pt has pacemaker   Procedure: IOVERA peripheral nerve focus cold therapy (non ablative cryotherapy) 12/30/15 - not help   Indication: Cryotherapy of select peripheral nerves of the head was performed to treat chronic headaches that failed to respond to several preventive pharmacological therapies.   Sedation: None   Informed consent: The procedure, risks, benefits and options were discussed with the patient. There are no contraindications to the procedure. The patient expressed understanding and agreed to the procedure. I verify that I personally obtained the patient's consent prior to the start of the procedure.  Location:  Bilateral Supra orbital nerve (3 cycles on R and 1 cycle on L)   Bilateral Occipital nerve   3 cycles   Pain relief: Pain score reduced 10/10->0/10   9/26 Bilateral Sphenopalatine Ganglion and bilateral Maxillary Branch (Trigeminal Nerve) Block - no help   ONB - not help     georges Pagan RN at 12/31/2014 3:54 PM                           Status: Signed                                       Expand All Collapse All   HEADACHE FASTTRACK  PAIN 7/10 NAUSEA 0/10  ROUND 1: TORADOL, IMITREX, MORPHINE, BENADRYL, AND MAGNESIUM  PAIN 7/10 NAUSEA 0/10  PT STATED SHE WAS READY TO GO HOME AND GO TO SLEEP. PT D/C'ED IN [facility]                              Shannon Pagan RN at 10/5/2015 12:49 PM                           Status: Signed                                       Expand All Collapse  All   HEADACHE FASTTRACK  PAIN 8/10 NAUSEA 10/10  FIRST ROUND: tORADOL, BENADRYL, COMPAZINE, IMITREX, MAGNESIUM, AND VALPROATE.  PAIN 1/10 NAUSEA 0/10  PT READY TO GO HOME AND FEELING BETTER. PT LEFT IN NAD WITH FAMILY MEMBER  TOTAL TIME: 9699-9578                         Shannon Pagan RN at 10/20/2015 3:05 PM                           Status: Signed                                       Expand All Collapse All   HEADACHE FASTTRACK  PAIN 10/10 NAUSEA 0/10  FIRST ROUND: tORADOL, BENADRYL, COMPAZINE, IMITREX, MAGNESIUM, AND VALPROATE.  BP BEING MONITORED EVERY 15 MIN AND REMAINED 170'S/80-90'S. DR CHOPRA NOTIFIED AND STATED TO MAKE SURE BP DID NOT EXCEED 180 SYSTOLIC OR PT SHOULD REPORT TO ED. BP AT 1500 183/92. DR CHOPRA NOTIFIED AND GAVE ORDER TO STOP INFUSION AND SEND PATIENT TO ED. PT BROUGHT TO ED BY INFUSION NURSE VIA WHEELCHAIR.   PAIN 8/10 NAUSEA 0/10  TOTAL TIME: 9043-2003                                                     Progress Notes                                               Shannon Pagan RN at 2/24/2016 1:36 PM       Status: Signed                  Expand All Collapse All   HEADACHE FASTTRACK  PAIN 10/10 NAUSEA 7/10  FIRST ROUND: tORADOL, BENADRYL, AND MORPHINE-BP RANGING FROM 177-203/84-92, HR 60-82  MD NOTIFIED AND GAVE ORDERS TO SEND TO ED. PT LEFT VIA W/C WITH INFUSION NURSE ON WAY TO ED.            Headache risk factors:   H/o TBI - [facility] (2013) + neck sprain   H/o Meningitis - no   F/h Aneurysms - no       Review of Systems   Constitutional: Negative.   HENT: Negative.   Eyes: Negative.   Respiratory: Negative.   Cardiovascular: Negative.   Gastrointestinal: Negative.   Endocrine: Negative.   Genitourinary: Negative.   Musculoskeletal: Negative.   Skin: Negative.   Allergic/Immunologic: Negative.   Hematological: Negative.   Psychiatric/Behavioral: insomnia      Objective:     Physical Exam   Constitutional: She is oriented to person, place, and time. She appears well-developed.   obesity    Psychiatric: She has a normal mood and flat affect. Her behavior is normal. Judgment and thought content normal.   Headache Exam:  Optic disc is flat OU   Temples appear symmetric  No V1,V2,V3 distribution allodynia/hyperalgesia catalina   No facial swelling  No gross TMJ abnormalities   No ptosis   No conj injection   No meningismus   No neck stiffness  No C spine tenderness  Cervical facet tenderness on palpation catalina   No tender points over trapezium   catalina supraorbital nerve exit point tenderness  catalina occipital nerve exit point tenderness  No auriculotemporal nerve tenderness   Tenderness of levator scapula [place]    Gait - wide based gait                       Assessment:                              1.  Occipital neuralgia                              2.  Cervicalgia                              3.  4  5  6 Chronic migraine without aura, with intractable migraine, so stated, with status migrainosus (post traumatic) post concussive?  Depression   TMJ - R sided   Insomnia - SHIRA      Head CT  Findings: There is no evidence of acute or recent major vascular territory cerebral infarction, parenchymal hemorrhage, or intra-axial mass.  There are no extra-axial masses or abnormal fluid collections.  The ventricular system is within normal limits of size for age and shows no distortion by mass-effect or evidence of hydrocephalus.  There is no fracture or destructive osseous lesion.  The extracranial soft tissues are unremarkable.  The visualized paranasal sinuses and mastoid air cells are clear.   Impression    No acute intracranial abnormality.  ______________________________________     Electronically signed by resident: KRISSY MAYERS MD  Date: 03/14/16  Time: 17:09            Plan:                              1. Prophylactic medication -   Despiramine 25 mg AM (for dizziness) PRN  zoloft 25 mg daily    Aricept 20 mg daily   Neurontin 900 mg TID    Magnesium 200 mg daily  Topamax 200 mg BID   Clonazepam BID for anxiety  PRN  On trazodone   Cont B2, B6 supplements  On bellsomra    2, Breakthrough headache - zanaflex 8 mg Q8, etodolac  Multiple alternative treatment options were offered to the patient   3. Refrain from over counter pain medications. Discussed medication overuse headache.cont Magnesium 400 mg PO BID   4. Occipital neuralgia - If medical management is ineffective may consider occipital nerve blocks.   5. I again urged the patient to keep a headache calender.    f/up Dr. Rock for TBI   Vit D supplements    f/up sleep clinic - CPAP - waiting for new machine   Refer to PMR for gait assessment   Cont AJOVY (April 2019- ) - gap Feb-April 2021)  No side effects      ONB 2 weeks prior to next BOTOX       BOTOX treatment response:   Prior to initiating BOTOX, the patient had 28   migraine days per month on average. This meets criteria for chronic migraine.   After starting BOTOX, the patient experienced a reduction in  25  days per month   After starting BOTOX, the patient experienced a reduction in > 100 hours of migraine symptoms per month   After starting BOTOX, the patient experienced a 50% reduction in burden of migraine days per month   Based on this information, BOTOX is medically necessary for the management of the patient's chronic migraine.     BOTOX Injection intervals:   Patient reports a 'wearing off effect' prior to the subsequent BOTOX injections at 12 weeks. This occurs at week 9-10  Symptoms of this a 'wearing off effect' include - worsening of migraine headache frequency and intensity   Medications used during the 'wearing off effect' period include flurbiprofen, zanaflex  Based on this information, it is medically necessary for the patient to receive BOTOX therapy at an interval of every 10 weeks for the management of chronic migraine.    BOTOX was performed as an indicated therapy for intractable chronic migraine headaches given that the patient failed > 5 prophylactic medications  Botulinum Toxin  Injection Procedure   Pre-operative diagnosis: Chronic migraine   Post-operative diagnosis: Same   Procedure: Chemical neurolysis   After risks and benefits were explained including bleeding, infection, worsening of pain, damage to the areas being injected, weakness of muscles, loss of muscle control, dysphagia if injecting the head or neck, facial droop if injecting the facial area, painful injection, allergic or other reaction to the medications being injected, and the failure of the procedure to help the problem, a signed consent was obtained.   The patient was placed in a comfortable area and the sites to be treated were identified.The area to be treated was prepped three times with alcohol and the alcohol allowed to dry. Next, a 30 gauge needle was used to inject the medication in the area to be treated.   Area(s) injected:   Total Botox used: 160 Units   Botox wastage: 40 Units   Injection sites:     muscle bilaterally ( a total of 5 units divided into 2 sites)   Procerus muscle (5 units)   Frontalis muscle bilaterally (a total of 20 units divided into 4 sites)   Temporalis muscle bilaterally (a total of 50 units divided into 8 sites)   Occipitalis muscle bilaterally (a total of 30 units divided into 6 sites)   Cervical paraspinal muscles (a total of 20 units divided into 4 sites)   Trapezius muscle bilaterally (a total of 30 units divided into 6 sites)   Masseter 5 units [place]      Complications: none       RTC for the next Botox injection: 10 weeks    Procedures

## 2023-07-19 ENCOUNTER — TELEPHONE (OUTPATIENT)
Dept: ADMINISTRATIVE | Facility: CLINIC | Age: 58
End: 2023-07-19
Payer: MEDICARE

## 2023-07-20 ENCOUNTER — OFFICE VISIT (OUTPATIENT)
Dept: NEUROLOGY | Facility: CLINIC | Age: 58
End: 2023-07-20
Payer: MEDICARE

## 2023-07-20 VITALS
HEART RATE: 76 BPM | SYSTOLIC BLOOD PRESSURE: 114 MMHG | BODY MASS INDEX: 52.01 KG/M2 | HEIGHT: 64 IN | DIASTOLIC BLOOD PRESSURE: 71 MMHG

## 2023-07-20 DIAGNOSIS — Z86.73 HISTORY OF STROKE: ICD-10-CM

## 2023-07-20 PROCEDURE — 99213 OFFICE O/P EST LOW 20 MIN: CPT | Mod: S$PBB,,, | Performed by: PSYCHIATRY & NEUROLOGY

## 2023-07-20 PROCEDURE — 99213 PR OFFICE/OUTPT VISIT, EST, LEVL III, 20-29 MIN: ICD-10-PCS | Mod: S$PBB,,, | Performed by: PSYCHIATRY & NEUROLOGY

## 2023-07-20 PROCEDURE — 99999 PR PBB SHADOW E&M-EST. PATIENT-LVL IV: CPT | Mod: PBBFAC,,, | Performed by: PSYCHIATRY & NEUROLOGY

## 2023-07-20 PROCEDURE — 99214 OFFICE O/P EST MOD 30 MIN: CPT | Mod: PBBFAC | Performed by: PSYCHIATRY & NEUROLOGY

## 2023-07-20 PROCEDURE — 99999 PR PBB SHADOW E&M-EST. PATIENT-LVL IV: ICD-10-PCS | Mod: PBBFAC,,, | Performed by: PSYCHIATRY & NEUROLOGY

## 2023-07-20 NOTE — PROGRESS NOTES
Vascular Neurology  Clinic Note    ___________________  ASSESSMENT & PLAN    Problem List Items Addressed This Visit          Unprioritized    History of stroke    Current Assessment & Plan     Came back to clinic asking why she is on Plavix in addition to Xarelto.  Looking back in documentation this is unclear why it was added several years ago.  At this time she has an indication for xarelto and should continue this medication for secondary stroke prevention in setting of atrial fibrillation  I do not see any other indication at this time to remain on plavix long term. We will discontinue this at this time.            Reason For Visit (Chief Complaint): prior patient of Dr. Ruth, f/u    HPI: 58 y.o. right handed female with  afib on Eliquis , prior cardiac arrest with associated acute ischemic stroke with residual mild L sided weakness, CAD with ICD in situ, HTN, and HLD who presented with acute R sided weakness and sensation changes in 2018 w/ ischemic stroke.    Wants to know why she is still on plavix in addition to Xarelto.        Other:     Relevant Labwork:  Recent Labs   Lab 05/05/23  0912   Hemoglobin A1C 6.2 H   LDL Cholesterol 52.6 L   HDL 31 L   Triglycerides 67   Cholesterol 97 L     Review of Systems  Msk: negative for muscle pain  Skin: negative for pruritis  Neuro: negative for headache  All others negative    Past Medical History  Past Medical History:   Diagnosis Date    Anticoagulant long-term use     Anxiety     Asthma     Atrial fibrillation     Brain anoxic injury     Cervicalgia 8/28/2014    CHI (closed head injury) 2/19/2013    Convulsion 5/30/2015    Decreased ROM of left shoulder 4/12/2017    Defibrillator activation 2013    Depression     Heart block     History of sudden cardiac arrest 2/2013    PEA arrest with subsequent long-QT    Hx of psychiatric care     Hypertension     Left atrial enlargement 4/11/2018    Pacemaker 2013    Paresthesia 11/1/2013    Prolonged Q-T interval on ECG  2/8/2013    Psychiatric problem     Seizures     Sleep difficulties     Stroke     weakness lt side    Therapy     Thyroid disease     Upper airway resistance syndrome 2/21/2017     Family History  No relevant history   Social History  Social History     Socioeconomic History    Marital status: Single   Occupational History     Employer: [employer]   Tobacco Use    Smoking status: Never     Passive exposure: Never    Smokeless tobacco: Never   Substance and Sexual Activity    Alcohol use: Yes     Comment: wine, socially    Drug use: No    Sexual activity: Not Currently   Social History Narrative    Now on disability     Social Determinants of Health     Financial Resource Strain: Low Risk     Difficulty of Paying Living Expenses: Not hard at all   Food Insecurity: Food Insecurity Present    Worried About Running Out of Food in the Last Year: Sometimes true    Ran Out of Food in the Last Year: Never true   Transportation Needs: Unmet Transportation Needs    Lack of Transportation (Medical): Yes    Lack of Transportation (Non-Medical): No   Physical Activity: Insufficiently Active    Days of Exercise per Week: 2 days    Minutes of Exercise per Session: 20 min   Stress: Stress Concern Present    Feeling of Stress : Rather much   Social Connections: Unknown    Frequency of Communication with Friends and Family: More than three times a week    Frequency of Social Gatherings with Friends and Family: Twice a week    Active Member of Clubs or Organizations: No    Attends Club or Organization Meetings: Never    Marital Status: Never    Housing Stability: Low Risk     Unable to Pay for Housing in the Last Year: No    Number of Places Lived in the Last Year: 1    Unstable Housing in the Last Year: No        Medication List with Changes/Refills   Current Medications    ACETAMINOPHEN (TYLENOL) 500 MG TABLET    Take 1-2 tablets (500-1,000 mg total) by mouth 3 (three) times daily as needed for Pain.     AMLODIPINE (NORVASC) 5 MG TABLET    Take 1 tablet (5 mg total) by mouth once daily.    ARIPIPRAZOLE (ABILIFY) 15 MG TAB    Take 1 tablet (15 mg total) by mouth once daily.    BLOOD SUGAR DIAGNOSTIC STRP    1 strip by Misc.(Non-Drug; Combo Route) route 2 (two) times daily.    BLOOD-GLUCOSE METER KIT    Please provide with insurance covered meter    CARVEDILOL (COREG) 25 MG TABLET    Take 1.5 tablets (37.5 mg total) by mouth 2 (two) times daily with meals.    CETIRIZINE (ZYRTEC) 10 MG TABLET    Take 1 tablet (10 mg total) by mouth once daily. for 7 days    CLONAZEPAM (KLONOPIN) 1 MG TABLET    TAKE 1 TABLET BY MOUTH DAILY AS NEEDED FOR ANXIETY    CYANOCOBALAMIN, VITAMIN B-12, 1,000 MCG SUBL    Place 1,000 mcg under the tongue once daily.    DICLOFENAC SODIUM (VOLTAREN) 1 % GEL    Apply 2 g topically 4 (four) times daily.    DONEPEZIL (ARICEPT) 10 MG TABLET    Take 1 tablet (10 mg total) by mouth 2 (two) times daily.    EPINEPHRINE (EPIPEN) 0.3 MG/0.3 ML ATIN    Inject 0.3 mLs (0.3 mg total) into the muscle once. for 1 dose    ETODOLAC (LODINE) 500 MG TABLET    Take 1 tablet (500 mg total) by mouth 2 (two) times daily as needed (migraine).    FAMOTIDINE (PEPCID) 20 MG TABLET    Take 1 tablet (20 mg total) by mouth 2 (two) times daily. for 7 days    FLURBIPROFEN (ANSAID) 100 MG TABLET    Take 1 tablet (100 mg total) by mouth 2 (two) times daily as needed (migraine).    FLURBIPROFEN (ANSAID) 100 MG TABLET    Take 1 tablet (100 mg total) by mouth 2 (two) times daily as needed (migraine).    FREMANEZUMAB-VFRM (AJOVY) 225 MG/1.5 ML INJECTION    Inject 1.5 mLs (225 mg total) into the skin every 28 days.    FUROSEMIDE (LASIX) 20 MG TABLET    Take 1 tablet (20 mg total) by mouth every other day.    GABAPENTIN (NEURONTIN) 300 MG CAPSULE    Take 1 capsule (300 mg total) by mouth 3 (three) times daily.    HYDROXYZINE HCL (ATARAX) 25 MG TABLET    Take 1 tablet (25 mg total) by mouth 3 (three) times daily as needed for Itching.     LANCETS MISC    1 Device by Misc.(Non-Drug; Combo Route) route 2 (two) times daily.    LOSARTAN (COZAAR) 100 MG TABLET    Take 1 tablet (100 mg total) by mouth once daily.    MAGNESIUM 200 MG TAB    Take 200 mg by mouth once daily.    OMEPRAZOLE (PRILOSEC) 40 MG CAPSULE    Take 1 capsule (40 mg total) by mouth once daily. Take 30 minutes before breakfast    ONDANSETRON (ZOFRAN-ODT) 4 MG TBDL    Take 1 tablet (4 mg total) by mouth every 12 (twelve) hours as needed (nausea).    POLYETHYLENE GLYCOL (GLYCOLAX) 17 GRAM PWPK    Take 17 g by mouth once daily.    RIVAROXABAN (XARELTO) 20 MG TAB    TAKE 1 TABLET BY MOUTH DAILY EVENING MEAL WITH DINNER    ROSUVASTATIN (CRESTOR) 40 MG TAB    TAKE 1 TABLET(40 MG) BY MOUTH EVERY EVENING    SERTRALINE (ZOLOFT) 100 MG TABLET    Take 2 tablets (200 mg total) by mouth once daily.    SILVER SULFADIAZINE 1% (SILVADENE) 1 % CREAM    Apply topically 2 (two) times daily.    SUVOREXANT (BELSOMRA) 10 MG TAB    Take 1 tablet by mouth nightly as needed (insomnia).    TIAGABINE (GABITRIL) 4 MG TABLET    Take 1 tablet (4 mg total) by mouth every evening.    TIZANIDINE (ZANAFLEX) 4 MG TABLET    TAKE 1 TABLET(4 MG) BY MOUTH EVERY 6 HOURS AS NEEDED    TRIAMCINOLONE ACETONIDE 0.1% (KENALOG) 0.1 % OINTMENT    Apply topically 2 (two) times daily.    TRUE METRIX GLUCOSE METER MISC    TEST BG BID    UBROGEPANT (UBROGEPANT) 100 MG TABLET    Take 1 tablet (100 mg total) by mouth 2 (two) times daily as needed for Migraine.    ZOLPIDEM (AMBIEN) 10 MG TAB    Take 1 tablet (10 mg total) by mouth nightly as needed (insomnia).   Discontinued Medications    CLOPIDOGREL (PLAVIX) 75 MG TABLET    Take 1 tablet (75 mg total) by mouth once daily.       EXAM  Vital Signs:  Vitals - 1 value per visit 4/19/2023 5/17/2023 5/17/2023 7/6/2023 7/6/2023 7/20/2023 7/20/2023   SYSTOLIC 122 - 150 - 129 - 114   DIASTOLIC 82 - 70 - 76 - 71   Pulse 86 - 85 - 59 - 76   Temp - - - - - - -   Resp - - - - - - -   SPO2 95 - 96 - - -  -   Weight (lb) 308.42 - 308.42 - 303.02 - -   Weight (kg) 139.9 - 139.9 - 137.45 - -   Height 64 - 64 - 64 - 64   BMI (Calculated) 52.9 - 52.9 - 52 - -   VISIT REPORT - - - - - - -   Pain Score  - 10 - 8 - 9 -   Some encounter information is confidential and restricted. Go to Review Flowsheets activity to see all data.   Some recent data might be hidden             MD Chandrakant  Vascular Neurology  Office [phone]  Fax [phone]

## 2023-07-24 PROBLEM — Z86.73 HISTORY OF STROKE: Status: ACTIVE | Noted: 2023-07-24

## 2023-07-24 NOTE — ASSESSMENT & PLAN NOTE
Came back to clinic asking why she is on Plavix in addition to Xarelto.  Looking back in documentation this is unclear why it was added several years ago.  At this time she has an indication for xarelto and should continue this medication for secondary stroke prevention in setting of atrial fibrillation  I do not see any other indication at this time to remain on plavix long term. We will discontinue this at this time.

## 2023-07-31 ENCOUNTER — EXTERNAL CHRONIC CARE MANAGEMENT (OUTPATIENT)
Dept: PRIMARY CARE CLINIC | Facility: CLINIC | Age: 58
End: 2023-07-31
Payer: MEDICARE

## 2023-07-31 PROCEDURE — 99439 PR CHRONIC CARE MGMT, EA ADDTL 20 MIN: ICD-10-PCS | Mod: S$PBB,,, | Performed by: INTERNAL MEDICINE

## 2023-07-31 PROCEDURE — 99439 CHRNC CARE MGMT STAF EA ADDL: CPT | Mod: S$PBB,,, | Performed by: INTERNAL MEDICINE

## 2023-07-31 PROCEDURE — 99439 CHRNC CARE MGMT STAF EA ADDL: CPT | Mod: PBBFAC | Performed by: INTERNAL MEDICINE

## 2023-07-31 PROCEDURE — 99490 PR CHRONIC CARE MGMT, 1ST 20 MIN: ICD-10-PCS | Mod: S$PBB,,, | Performed by: INTERNAL MEDICINE

## 2023-07-31 PROCEDURE — 99490 CHRNC CARE MGMT STAFF 1ST 20: CPT | Mod: S$PBB,,, | Performed by: INTERNAL MEDICINE

## 2023-07-31 PROCEDURE — 99490 CHRNC CARE MGMT STAFF 1ST 20: CPT | Mod: PBBFAC | Performed by: INTERNAL MEDICINE

## 2023-08-14 ENCOUNTER — TELEPHONE (OUTPATIENT)
Dept: ADMINISTRATIVE | Facility: CLINIC | Age: 58
End: 2023-08-14
Payer: MEDICARE

## 2023-08-14 ENCOUNTER — PATIENT MESSAGE (OUTPATIENT)
Dept: ADMINISTRATIVE | Facility: CLINIC | Age: 58
End: 2023-08-14
Payer: MEDICARE

## 2023-08-15 RX ORDER — TIZANIDINE 4 MG/1
4 TABLET ORAL EVERY 6 HOURS PRN
Qty: 90 TABLET | Refills: 12 | OUTPATIENT
Start: 2023-08-15

## 2023-08-15 RX ORDER — TIZANIDINE 4 MG/1
TABLET ORAL
Qty: 90 TABLET | Refills: 12 | OUTPATIENT
Start: 2023-08-15

## 2023-08-15 RX ORDER — TIZANIDINE 4 MG/1
TABLET ORAL
Qty: 90 TABLET | Refills: 12 | OUTPATIENT
Start: 2023-08-15 | End: 2023-09-22

## 2023-08-15 NOTE — TELEPHONE ENCOUNTER
----- Message from Rufina Luna MA sent at 8/14/2023  3:56 PM CDT -----  Regarding: FW: Refill  Contact: Pt [phone]    ----- Message -----  From: Debo London  Sent: 8/14/2023   3:49 PM CDT  To: Dana Hernandez Staff  Subject: Refill                                           Pt is calling to get a refill on Rx: tiZANidine (ZANAFLEX) 4 MG tablet 90 tablet please call     [id number] - [place], [place] - [address] AT [address] [place] [address]  Phone: [phone] Fax: [phone]

## 2023-08-16 ENCOUNTER — OFFICE VISIT (OUTPATIENT)
Dept: INTERNAL MEDICINE | Facility: CLINIC | Age: 58
End: 2023-08-16
Payer: MEDICARE

## 2023-08-16 ENCOUNTER — TELEPHONE (OUTPATIENT)
Dept: ADMINISTRATIVE | Facility: CLINIC | Age: 58
End: 2023-08-16
Payer: MEDICARE

## 2023-08-16 DIAGNOSIS — D70.9 NEUTROPENIA, UNSPECIFIED TYPE: ICD-10-CM

## 2023-08-16 DIAGNOSIS — H16.223 KERATOCONJUNCTIVITIS SICCA OF BOTH EYES NOT SPECIFIED AS SJOGREN'S: ICD-10-CM

## 2023-08-16 DIAGNOSIS — F06.70 MILD NEUROCOGNITIVE DISORDER DUE TO MULTIPLE ETIOLOGIES: ICD-10-CM

## 2023-08-16 DIAGNOSIS — R56.9 SEIZURE: ICD-10-CM

## 2023-08-16 DIAGNOSIS — H91.90 DECREASED HEARING, UNSPECIFIED LATERALITY: ICD-10-CM

## 2023-08-16 DIAGNOSIS — F33.1 DEPRESSION, MAJOR, RECURRENT, MODERATE: ICD-10-CM

## 2023-08-16 DIAGNOSIS — M46.00 SPINAL ENTHESOPATHY: ICD-10-CM

## 2023-08-16 DIAGNOSIS — S06.9X0S COGNITIVE DEFICIT AS LATE EFFECT OF TRAUMATIC BRAIN INJURY: ICD-10-CM

## 2023-08-16 DIAGNOSIS — E23.6 EMPTY SELLA: ICD-10-CM

## 2023-08-16 DIAGNOSIS — F06.8 COGNITIVE DEFICIT AS LATE EFFECT OF TRAUMATIC BRAIN INJURY: ICD-10-CM

## 2023-08-16 DIAGNOSIS — Z00.00 ENCOUNTER FOR PREVENTIVE HEALTH EXAMINATION: Primary | ICD-10-CM

## 2023-08-16 PROCEDURE — G0439 PR MEDICARE ANNUAL WELLNESS SUBSEQUENT VISIT: ICD-10-PCS | Mod: 95,,, | Performed by: NURSE PRACTITIONER

## 2023-08-16 PROCEDURE — G0439 PPPS, SUBSEQ VISIT: HCPCS | Mod: 95,,, | Performed by: NURSE PRACTITIONER

## 2023-08-16 NOTE — PROGRESS NOTES
The patient location is: [place]  The chief complaint leading to consultation is: HRA    Visit type: audiovisual    Face to Face time with patient:20  25 minutes of total time spent on the encounter, which includes face to face time and non-face to face time preparing to see the patient (eg, review of tests), Obtaining and/or reviewing separately obtained history, Documenting clinical information in the electronic or other health record, Independently interpreting results (not separately reported) and communicating results to the patient/family/caregiver, or Care coordination (not separately reported).         Each patient to whom he or she provides medical services by telemedicine is:  (1) informed of the relationship between the physician and patient and the respective role of any other health care provider with respect to management of the patient; and (2) notified that he or she may decline to receive medical services by telemedicine and may withdraw from such care at any time.    Notes:       Amna Chawla presented for a  Medicare AWV and comprehensive Health Risk Assessment today. The following components were reviewed and updated:    Medical history  Family History  Social history  Allergies and Current Medications  Health Risk Assessment  Health Maintenance  Care Team         ** See Completed Assessments for Annual Wellness Visit within the encounter summary.**         The following assessments were completed:  Living Situation  CAGE  Depression Screening  Fall Risk Assessment (MACH 10)  Hearing Assessment(HHI)  Cognitive Function Screening - not done due to cognitive impairment  Nutrition Screening  ADL Screening  PAQ Screening      There were no vitals filed for this visit.  There is no height or weight on file to calculate BMI.  Physical Exam  Constitutional:       Appearance: Normal appearance. She is obese.   HENT:      Head: Normocephalic and atraumatic.      Right Ear: External ear normal.       Left Ear: External ear normal.      Nose: Nose normal.   Pulmonary:      Effort: Pulmonary effort is normal.   Neurological:      Mental Status: She is alert and oriented to person, place, and time.   Psychiatric:         Mood and Affect: Mood normal.         Behavior: Behavior normal.               Diagnoses and health risks identified today and associated recommendations/orders:    1. Encounter for preventive health examination  Health Maintenance updated   Records reviewed   Exam done     2. 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 Mild neurocognitive disorder due to multiple etiologies  Stable and chronic. Followed by PCP. Memory clinic referral placed.     4. Depression, major, recurrent, moderate  PHQ 13 today. Patient denies any SI/HI. Stable and chronic. Followed by psychology and therapy.     5. Seizure  History of seizure 5/30/15.     6. Keratoconjunctivitis sicca of both eyes not specified as Sjogren's  Stable and chronic. Followed by PCP.     7. Empty sella  Noted on CT 5/17/2022. Stable and chronic.     8. Decreased hearing, unspecified laterality  - Ambulatory referral/consult to Audiology; Future  - Ambulatory referral/consult to ENT; Future    9. Neutropenia, unspecified type  Noted intermittently on labs. Stable and chronic.     10. 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 Continue supportive care. Stable and chronic.   Counseling and Referral of Other Preventative  (Italic type indicates deductible and co-insurance are waived)    Patient Name: Amna Chawla  Today's Date: 8/16/2023    Health Maintenance         Date Due Completion Date    COVID-19 Vaccine (4 - Moderna series) 03/07/2022 1/10/2022    Influenza Vaccine (1) 09/01/2023 10/19/2022    Hemoglobin A1c (Prediabetes) 05/05/2024 5/5/2023    Lipid Panel 05/05/2024 5/5/2023    Mammogram 06/23/2024 6/23/2023    High Dose Statin  08/16/2024 8/16/2023    Cervical Cancer Screening 09/10/2024 9/10/2019    Colorectal Cancer Screening 06/02/2027 6/2/2022    TETANUS VACCINE 10/02/2028 10/2/2018          Orders Placed This Encounter   Procedures    Ambulatory referral/consult to Audiology    Ambulatory referral/consult to ENT    Ambulatory referral/consult to Neurology       Provided Amna with a 5-10 year written screening schedule and personal prevention plan. Recommendations were developed using the USPSTF age appropriate recommendations. Education, counseling, and referrals were provided as needed. After Visit Summary printed and given to patient which includes a list of additional screenings\tests needed.    Follow up in about 9 weeks (around 10/18/2023).    Tina Toledo, NP      I offered to discuss advanced care planning, including how to pick a person who would make decisions for you if you were unable to make them for yourself, called a health care power of , and what kind of decisions you might make such as use of life sustaining treatments such as ventilators and tube feeding when faced with a life limiting illness recorded on a living will that they will need to know. (How you want to be cared for as you near the end of your natural life)     X Patient is interested in learning more about how to make advanced directives.  I provided them paperwork and offered to discuss this with them.  Review for Opioid Screening: Pt does not have Rx for Opioids     Review for Substance Use Disorders: Patient does not use substance

## 2023-08-16 NOTE — PATIENT INSTRUCTIONS
Counseling and Referral of Other Preventative  (Italic type indicates deductible and co-insurance are waived)    Patient Name: Amna Chawla  Today's Date: 8/16/2023    Health Maintenance       Date Due Completion Date    COVID-19 Vaccine (4 - Moderna series) 03/07/2022 1/10/2022    Influenza Vaccine (1) 09/01/2023 10/19/2022    Hemoglobin A1c (Prediabetes) 05/05/2024 5/5/2023    Lipid Panel 05/05/2024 5/5/2023    Mammogram 06/23/2024 6/23/2023    High Dose Statin 08/16/2024 8/16/2023    Cervical Cancer Screening 09/10/2024 9/10/2019    Colorectal Cancer Screening 06/02/2027 6/2/2022    TETANUS VACCINE 10/02/2028 10/2/2018        No orders of the defined types were placed in this encounter.    The following information is provided to all patients.  This information is to help you find resources for any of the problems found today that may be affecting your health:                Living healthy guide: www.[facility].[place].gov      Understanding Diabetes: www.diabetes.org      Eating healthy: www.cdc.gov/healthyweight      CDC home safety checklist: www.cdc.gov/steadi/patient.html      Agency on Aging: www.goea.[place].gov      Alcoholics anonymous (AA): www.aa.org      Physical Activity: www.johnnie.nih.gov/qi1xnzs      Tobacco use: www.quitwithusla.org

## 2023-08-30 ENCOUNTER — OFFICE VISIT (OUTPATIENT)
Dept: PSYCHIATRY | Facility: CLINIC | Age: 58
End: 2023-08-30
Payer: MEDICARE

## 2023-08-30 DIAGNOSIS — F33.1 DEPRESSION, MAJOR, RECURRENT, MODERATE: Primary | ICD-10-CM

## 2023-08-30 PROCEDURE — 90834 PR PSYCHOTHERAPY W/PATIENT, 45 MIN: ICD-10-PCS | Mod: 95,,, | Performed by: SOCIAL WORKER

## 2023-08-30 PROCEDURE — 90834 PSYTX W PT 45 MINUTES: CPT | Mod: 95,,, | Performed by: SOCIAL WORKER

## 2023-08-30 NOTE — PROGRESS NOTES
Individual Psychotherapy (PhD/LCSW)    2023    Site:  [facility]         Therapeutic Intervention: Met with patient.  Outpatient - Insight oriented psychotherapy 45 min - CPT code 44094    Chief complaint/reason for encounter: depression     Interval history and content of current session: The patient location is: home in [place]  The chief complaint leading to consultation is: depression  Visit type: audiovisual  Total time spent with patient: 40 minutes  Each patient to whom he or she provides medical services by telemedicine is:  (1) informed of the relationship between the physician and patient and the respective role of any other health care provider with respect to management of the patient; and (2) notified that he or she may decline to receive medical services by telemedicine and may withdraw from such care at any time.    Notes: Follow-up with pt.  She reports that her older daughter had her 2nd child 2 weeks ago and almost  from childbirth due to a blood clot.  She has been upset about this. Now her 16 year old daughter is pregnant from her 19 year old old BF.  Daughter is refusing to discuss this with pt and seems unaware of the reality of her situation and how it will limit her life.  Gave pt info about resources for pregnant teens.    Treatment plan:  Target symptoms: depression  Why chosen therapy is appropriate versus another modality: relevant to diagnosis  Outcome monitoring methods: self-report, observation  Therapeutic intervention type: insight oriented psychotherapy, supportive psychotherapy    Risk parameters:  Patient reports no suicidal ideation  Patient reports no homicidal ideation  Patient reports no self-injurious behavior  Patient reports no violent behavior    Verbal deficits: None    Patient's response to intervention:  The patient's response to intervention is motivated.    Progress toward goals and other mental status changes:  The patient's progress toward goals  is fair .    Diagnosis:     ICD-10-CM ICD-9-CM   1. Depression, major, recurrent, moderate  F33.1 296.32       Plan:  individual psychotherapy, group psychotherapy and medication management by physician    Return to clinic: 1 month

## 2023-08-31 ENCOUNTER — EXTERNAL CHRONIC CARE MANAGEMENT (OUTPATIENT)
Dept: PRIMARY CARE CLINIC | Facility: CLINIC | Age: 58
End: 2023-08-31
Payer: MEDICARE

## 2023-08-31 PROCEDURE — 99487 CPLX CHRNC CARE 1ST 60 MIN: CPT | Mod: S$PBB,,, | Performed by: INTERNAL MEDICINE

## 2023-08-31 PROCEDURE — 99487 PR COMPLX CHRON CARE MGMT, 1ST HR, PER MONTH: ICD-10-PCS | Mod: S$PBB,,, | Performed by: INTERNAL MEDICINE

## 2023-08-31 PROCEDURE — 99489 CPLX CHRNC CARE EA ADDL 30: CPT | Mod: PBBFAC,27 | Performed by: INTERNAL MEDICINE

## 2023-08-31 PROCEDURE — 99489 CPLX CHRNC CARE EA ADDL 30: CPT | Mod: S$PBB,,, | Performed by: INTERNAL MEDICINE

## 2023-08-31 PROCEDURE — 99487 CPLX CHRNC CARE 1ST 60 MIN: CPT | Mod: PBBFAC,25 | Performed by: INTERNAL MEDICINE

## 2023-08-31 PROCEDURE — 99489 PR COMPLX CHRON CARE MGMT, EA ADDTL 30 MIN, PER MONTH: ICD-10-PCS | Mod: S$PBB,,, | Performed by: INTERNAL MEDICINE

## 2023-09-08 ENCOUNTER — PATIENT MESSAGE (OUTPATIENT)
Dept: ADMINISTRATIVE | Facility: OTHER | Age: 58
End: 2023-09-08
Payer: MEDICARE

## 2023-09-18 ENCOUNTER — PATIENT MESSAGE (OUTPATIENT)
Dept: INTERNAL MEDICINE | Facility: CLINIC | Age: 58
End: 2023-09-18
Payer: MEDICARE

## 2023-09-20 ENCOUNTER — OFFICE VISIT (OUTPATIENT)
Dept: INTERNAL MEDICINE | Facility: CLINIC | Age: 58
End: 2023-09-20
Payer: MEDICARE

## 2023-09-20 ENCOUNTER — IMMUNIZATION (OUTPATIENT)
Dept: INTERNAL MEDICINE | Facility: CLINIC | Age: 58
End: 2023-09-20
Payer: MEDICARE

## 2023-09-20 ENCOUNTER — HOSPITAL ENCOUNTER (OUTPATIENT)
Dept: RADIOLOGY | Facility: HOSPITAL | Age: 58
Discharge: HOME | End: 2023-09-20
Attending: PHYSICIAN ASSISTANT
Payer: MEDICARE

## 2023-09-20 VITALS
OXYGEN SATURATION: 99 % | BODY MASS INDEX: 50.02 KG/M2 | DIASTOLIC BLOOD PRESSURE: 60 MMHG | WEIGHT: 293 LBS | HEART RATE: 77 BPM | HEIGHT: 64 IN | SYSTOLIC BLOOD PRESSURE: 110 MMHG

## 2023-09-20 DIAGNOSIS — D51.9 ANEMIA DUE TO VITAMIN B12 DEFICIENCY, UNSPECIFIED B12 DEFICIENCY TYPE: ICD-10-CM

## 2023-09-20 DIAGNOSIS — M54.9 UPPER BACK PAIN: ICD-10-CM

## 2023-09-20 DIAGNOSIS — R05.9 COUGH, UNSPECIFIED TYPE: Primary | ICD-10-CM

## 2023-09-20 DIAGNOSIS — R07.81 RIB PAIN: ICD-10-CM

## 2023-09-20 DIAGNOSIS — R05.9 COUGH, UNSPECIFIED TYPE: ICD-10-CM

## 2023-09-20 PROCEDURE — 99214 OFFICE O/P EST MOD 30 MIN: CPT | Mod: S$PBB,,, | Performed by: PHYSICIAN ASSISTANT

## 2023-09-20 PROCEDURE — 99214 PR OFFICE/OUTPT VISIT, EST, LEVL IV, 30-39 MIN: ICD-10-PCS | Mod: S$PBB,,, | Performed by: PHYSICIAN ASSISTANT

## 2023-09-20 PROCEDURE — 99999PBSHW FLU VACCINE (QUAD) GREATER THAN OR EQUAL TO 3YO PRESERVATIVE FREE IM: Mod: PBBFAC,,,

## 2023-09-20 PROCEDURE — 71046 X-RAY EXAM CHEST 2 VIEWS: CPT | Mod: 26,,, | Performed by: RADIOLOGY

## 2023-09-20 PROCEDURE — 71046 XR CHEST PA AND LATERAL: ICD-10-PCS | Mod: 26,,, | Performed by: RADIOLOGY

## 2023-09-20 PROCEDURE — 99999PBSHW FLU VACCINE (QUAD) GREATER THAN OR EQUAL TO 3YO PRESERVATIVE FREE IM: ICD-10-PCS | Mod: PBBFAC,,,

## 2023-09-20 PROCEDURE — G0008 ADMIN INFLUENZA VIRUS VAC: HCPCS | Mod: PBBFAC

## 2023-09-20 PROCEDURE — 71046 X-RAY EXAM CHEST 2 VIEWS: CPT | Mod: TC

## 2023-09-20 PROCEDURE — 99215 OFFICE O/P EST HI 40 MIN: CPT | Mod: PBBFAC,25 | Performed by: PHYSICIAN ASSISTANT

## 2023-09-20 PROCEDURE — 99999 PR PBB SHADOW E&M-EST. PATIENT-LVL V: CPT | Mod: PBBFAC,,, | Performed by: PHYSICIAN ASSISTANT

## 2023-09-20 PROCEDURE — 99999 PR PBB SHADOW E&M-EST. PATIENT-LVL V: ICD-10-PCS | Mod: PBBFAC,,, | Performed by: PHYSICIAN ASSISTANT

## 2023-09-20 RX ORDER — BENZONATATE 100 MG/1
100 CAPSULE ORAL 3 TIMES DAILY PRN
Qty: 20 CAPSULE | Refills: 0 | Status: SHIPPED | OUTPATIENT
Start: 2023-09-20 | End: 2023-10-18

## 2023-09-20 RX ORDER — DICLOFENAC SODIUM 10 MG/G
2 GEL TOPICAL 4 TIMES DAILY
Qty: 100 G | Refills: 1 | Status: SHIPPED | OUTPATIENT
Start: 2023-09-20

## 2023-09-20 RX ORDER — MAGNESIUM 200 MG
1000 TABLET ORAL DAILY
Qty: 90 TABLET | Refills: 3 | Status: SHIPPED | OUTPATIENT
Start: 2023-09-20

## 2023-09-20 NOTE — PROGRESS NOTES
Subjective     Patient ID: Amna Chawla is a 58 y.o. female.    Chief Complaint: Back Pain    HPI    Established pt of Tiffany Mina MD (new to me)    Pt here with concerns of mid back pain and bilateral ib pain for the past 2 weeks. She attributes to chair/sleeping position changes as she has been visiting her dtr in the ICU and staying overnight. Pain is worse with certain movement and cough. She notes a cough, congestion, hoarseness for the past 1/5 weeks. No sob, fevers, sweats, cp, n/v/d. She has tried a muscle relaxer which has been helpful. She is concerned about pneumonia.     Needs refill of b12  Past Medical History:   Diagnosis Date    Anticoagulant long-term use     Anxiety     Asthma     Atrial fibrillation     Brain anoxic injury     Cervicalgia 8/28/2014    CHI (closed head injury) 2/19/2013    Convulsion 5/30/2015    Decreased ROM of left shoulder 4/12/2017    Defibrillator activation 2013    Depression     Heart block     History of sudden cardiac arrest 2/2013    PEA arrest with subsequent long-QT    Hx of psychiatric care     Hypertension     Left atrial enlargement 4/11/2018    Pacemaker 2013    Paresthesia 11/1/2013    Prolonged Q-T interval on ECG 2/8/2013    Psychiatric problem     Seizures     Sleep difficulties     Stroke     weakness lt side    Therapy     Thyroid disease     Upper airway resistance syndrome 2/21/2017     Social History     Tobacco Use    Smoking status: Never     Passive exposure: Never    Smokeless tobacco: Never   Substance Use Topics    Alcohol use: Yes     Comment: wine, socially    Drug use: No     Review of patient's allergies indicates:   Allergen Reactions    Aspirin Hives    Imitrex [sumatriptan] Palpitations    Penicillins Hives and Swelling    Shellfish containing products Anaphylaxis     seafood    Reglan [metoclopramide hcl] Other (See Comments)     Parkinsonism        Review of Systems   Constitutional:  Negative for chills, fever and unexpected weight  "change.   HENT:  Positive for voice change (hoarse). Negative for ear pain, sore throat and trouble swallowing.    Respiratory:  Positive for cough. Negative for chest tightness, shortness of breath and wheezing.    Cardiovascular:  Negative for chest pain, palpitations and leg swelling.   Gastrointestinal:  Negative for abdominal pain, nausea and vomiting.   Musculoskeletal:  Positive for arthralgias and back pain.   Integumentary:  Negative for rash.   Neurological:  Negative for weakness.          Objective  Blood pressure 110/60, pulse 77, height 5' 4" (1.626 m), weight 134 kg (295 lb 6.7 oz), last menstrual period 01/03/2016, SpO2 99 %.      Physical Exam  Vitals reviewed.   Constitutional:       General: She is not in acute distress.     Appearance: She is well-developed.   HENT:      Head: Normocephalic and atraumatic.      Right Ear: Tympanic membrane, ear canal and external ear normal.      Left Ear: Tympanic membrane, ear canal and external ear normal.   Cardiovascular:      Rate and Rhythm: Normal rate and regular rhythm.      Heart sounds: No murmur heard.  Pulmonary:      Effort: Pulmonary effort is normal.      Breath sounds: Normal breath sounds. No wheezing or rales.   Chest:      Chest wall: No mass, swelling or edema.       Abdominal:      General: Bowel sounds are normal.      Palpations: Abdomen is soft.      Tenderness: There is no abdominal tenderness.   Musculoskeletal:      Thoracic back: Tenderness present. No bony tenderness. Decreased range of motion.        Back:       Right lower leg: No edema.      Left lower leg: No edema.   Skin:     General: Skin is warm and dry.      Findings: No rash.   Neurological:      Mental Status: She is alert.   Psychiatric:         Mood and Affect: Mood normal.            Assessment and Plan     1. Cough, unspecified type  -     X-Ray Chest PA And Lateral; Future; Expected date: 09/20/2023  -     benzonatate (TESSALON) 100 MG capsule; Take 1 capsule (100 mg " total) by mouth 3 (three) times daily as needed for Cough.  Dispense: 20 capsule; Refill: 0    2. Upper back pain  -     X-Ray Chest PA And Lateral; Future; Expected date: 09/20/2023  -     diclofenac sodium (VOLTAREN) 1 % Gel; Apply 2 g topically 4 (four) times daily.  Dispense: 100 g; Refill: 1    3. Rib pain  -     X-Ray Chest PA And Lateral; Future; Expected date: 09/20/2023  -     diclofenac sodium (VOLTAREN) 1 % Gel; Apply 2 g topically 4 (four) times daily.  Dispense: 100 g; Refill: 1    4. Anemia due to vitamin B12 deficiency, unspecified B12 deficiency type  -     cyanocobalamin, vitamin B-12, 1,000 mcg Subl; Place 1,000 mcg under the tongue once daily.  Dispense: 90 tablet; Refill: 3        Patient Instructions   Continue muscle relaxer    Heating pain    Try anti-inflammatory (etoladoc or diclofenac ointment/gel)    Cough pill sent to pharmacy    Xrat today, I will message about results.       Dasha Osorio PA-C

## 2023-09-20 NOTE — PATIENT INSTRUCTIONS
Continue muscle relaxer    Heating pain    Try anti-inflammatory (etoladoc or diclofenac ointment/gel)    Cough pill sent to pharmacy    Xrat today, I will message about results.

## 2023-09-22 ENCOUNTER — HOSPITAL ENCOUNTER (EMERGENCY)
Facility: HOSPITAL | Age: 58
Discharge: HOME | End: 2023-09-22
Attending: STUDENT IN AN ORGANIZED HEALTH CARE EDUCATION/TRAINING PROGRAM
Payer: MEDICARE

## 2023-09-22 VITALS
RESPIRATION RATE: 20 BRPM | BODY MASS INDEX: 48.65 KG/M2 | DIASTOLIC BLOOD PRESSURE: 68 MMHG | WEIGHT: 285 LBS | OXYGEN SATURATION: 100 % | TEMPERATURE: 98 F | HEART RATE: 72 BPM | SYSTOLIC BLOOD PRESSURE: 132 MMHG | HEIGHT: 64 IN

## 2023-09-22 DIAGNOSIS — R10.9 FLANK PAIN: ICD-10-CM

## 2023-09-22 DIAGNOSIS — M54.9 BACK PAIN, UNSPECIFIED BACK LOCATION, UNSPECIFIED BACK PAIN LATERALITY, UNSPECIFIED CHRONICITY: Primary | ICD-10-CM

## 2023-09-22 LAB
ALBUMIN SERPL BCP-MCNC: 2.9 G/DL (ref 3.5–5.2)
ALP SERPL-CCNC: 58 U/L (ref 55–135)
ALT SERPL W/O P-5'-P-CCNC: 16 U/L (ref 10–44)
ANION GAP SERPL CALC-SCNC: 5 MMOL/L (ref 8–16)
AST SERPL-CCNC: 17 U/L (ref 10–40)
BASOPHILS # BLD AUTO: 0 K/UL (ref 0–0.2)
BASOPHILS NFR BLD: 0 % (ref 0–1.9)
BILIRUB SERPL-MCNC: 0.3 MG/DL (ref 0.1–1)
BILIRUB UR QL STRIP: ABNORMAL
BNP SERPL-MCNC: 21 PG/ML (ref 0–99)
BUN SERPL-MCNC: 8 MG/DL (ref 6–20)
CALCIUM SERPL-MCNC: 8.7 MG/DL (ref 8.7–10.5)
CHLORIDE SERPL-SCNC: 107 MMOL/L (ref 95–110)
CLARITY UR REFRACT.AUTO: CLEAR
CO2 SERPL-SCNC: 25 MMOL/L (ref 23–29)
COLOR UR AUTO: YELLOW
CREAT SERPL-MCNC: 0.9 MG/DL (ref 0.5–1.4)
DIFFERENTIAL METHOD: ABNORMAL
EOSINOPHIL # BLD AUTO: 0 K/UL (ref 0–0.5)
EOSINOPHIL NFR BLD: 0 % (ref 0–8)
ERYTHROCYTE [DISTWIDTH] IN BLOOD BY AUTOMATED COUNT: 16.5 % (ref 11.5–14.5)
EST. GFR  (NO RACE VARIABLE): >60 ML/MIN/1.73 M^2
GLUCOSE SERPL-MCNC: 121 MG/DL (ref 70–110)
GLUCOSE UR QL STRIP: NEGATIVE
HCT VFR BLD AUTO: 33.9 % (ref 37–48.5)
HGB BLD-MCNC: 10.9 G/DL (ref 12–16)
HGB UR QL STRIP: NEGATIVE
IMM GRANULOCYTES # BLD AUTO: 0 K/UL (ref 0–0.04)
IMM GRANULOCYTES NFR BLD AUTO: 0 % (ref 0–0.5)
KETONES UR QL STRIP: NEGATIVE
LEUKOCYTE ESTERASE UR QL STRIP: NEGATIVE
LYMPHOCYTES # BLD AUTO: 1.3 K/UL (ref 1–4.8)
LYMPHOCYTES NFR BLD: 42.5 % (ref 18–48)
MCH RBC QN AUTO: 29.9 PG (ref 27–31)
MCHC RBC AUTO-ENTMCNC: 32.2 G/DL (ref 32–36)
MCV RBC AUTO: 93 FL (ref 82–98)
MONOCYTES # BLD AUTO: 0.4 K/UL (ref 0.3–1)
MONOCYTES NFR BLD: 13.6 % (ref 4–15)
NEUTROPHILS # BLD AUTO: 1.4 K/UL (ref 1.8–7.7)
NEUTROPHILS NFR BLD: 43.9 % (ref 38–73)
NITRITE UR QL STRIP: NEGATIVE
NRBC BLD-RTO: 0 /100 WBC
PH UR STRIP: 6 [PH] (ref 5–8)
PLATELET # BLD AUTO: 229 K/UL (ref 150–450)
PMV BLD AUTO: 12 FL (ref 9.2–12.9)
POTASSIUM SERPL-SCNC: 3.8 MMOL/L (ref 3.5–5.1)
PROT SERPL-MCNC: 9.7 G/DL (ref 6–8.4)
PROT UR QL STRIP: NEGATIVE
RBC # BLD AUTO: 3.65 M/UL (ref 4–5.4)
SODIUM SERPL-SCNC: 137 MMOL/L (ref 136–145)
SP GR UR STRIP: 1.01 (ref 1–1.03)
TROPONIN I SERPL DL<=0.01 NG/ML-MCNC: <0.006 NG/ML (ref 0–0.03)
URN SPEC COLLECT METH UR: ABNORMAL
WBC # BLD AUTO: 3.08 K/UL (ref 3.9–12.7)

## 2023-09-22 PROCEDURE — 25000003 PHARM REV CODE 250: Performed by: STUDENT IN AN ORGANIZED HEALTH CARE EDUCATION/TRAINING PROGRAM

## 2023-09-22 PROCEDURE — 84484 ASSAY OF TROPONIN QUANT: CPT | Performed by: STUDENT IN AN ORGANIZED HEALTH CARE EDUCATION/TRAINING PROGRAM

## 2023-09-22 PROCEDURE — 83880 ASSAY OF NATRIURETIC PEPTIDE: CPT | Performed by: STUDENT IN AN ORGANIZED HEALTH CARE EDUCATION/TRAINING PROGRAM

## 2023-09-22 PROCEDURE — 25500020 PHARM REV CODE 255: Performed by: STUDENT IN AN ORGANIZED HEALTH CARE EDUCATION/TRAINING PROGRAM

## 2023-09-22 PROCEDURE — 96374 THER/PROPH/DIAG INJ IV PUSH: CPT | Mod: 59

## 2023-09-22 PROCEDURE — 93010 ELECTROCARDIOGRAM REPORT: CPT | Mod: ,,, | Performed by: INTERNAL MEDICINE

## 2023-09-22 PROCEDURE — 63600175 PHARM REV CODE 636 W HCPCS: Performed by: STUDENT IN AN ORGANIZED HEALTH CARE EDUCATION/TRAINING PROGRAM

## 2023-09-22 PROCEDURE — 85025 COMPLETE CBC W/AUTO DIFF WBC: CPT | Performed by: STUDENT IN AN ORGANIZED HEALTH CARE EDUCATION/TRAINING PROGRAM

## 2023-09-22 PROCEDURE — 96375 TX/PRO/DX INJ NEW DRUG ADDON: CPT

## 2023-09-22 PROCEDURE — 99285 EMERGENCY DEPT VISIT HI MDM: CPT | Mod: 25

## 2023-09-22 PROCEDURE — 80053 COMPREHEN METABOLIC PANEL: CPT | Performed by: STUDENT IN AN ORGANIZED HEALTH CARE EDUCATION/TRAINING PROGRAM

## 2023-09-22 PROCEDURE — 93010 EKG 12-LEAD: ICD-10-PCS | Mod: ,,, | Performed by: INTERNAL MEDICINE

## 2023-09-22 PROCEDURE — 93005 ELECTROCARDIOGRAM TRACING: CPT

## 2023-09-22 PROCEDURE — 81003 URINALYSIS AUTO W/O SCOPE: CPT | Performed by: STUDENT IN AN ORGANIZED HEALTH CARE EDUCATION/TRAINING PROGRAM

## 2023-09-22 RX ORDER — LIDOCAINE 50 MG/G
1 PATCH TOPICAL DAILY
Qty: 7 PATCH | Refills: 0 | Status: SHIPPED | OUTPATIENT
Start: 2023-09-22 | End: 2023-10-18

## 2023-09-22 RX ORDER — ACETAMINOPHEN 500 MG
1000 TABLET ORAL
Status: COMPLETED | OUTPATIENT
Start: 2023-09-22 | End: 2023-09-22

## 2023-09-22 RX ORDER — DIPHENHYDRAMINE HYDROCHLORIDE 50 MG/ML
25 INJECTION INTRAMUSCULAR; INTRAVENOUS
Status: COMPLETED | OUTPATIENT
Start: 2023-09-22 | End: 2023-09-22

## 2023-09-22 RX ORDER — METHOCARBAMOL 500 MG/1
500 TABLET, FILM COATED ORAL 3 TIMES DAILY
Qty: 15 TABLET | Refills: 0 | Status: SHIPPED | OUTPATIENT
Start: 2023-09-22 | End: 2023-10-18

## 2023-09-22 RX ORDER — LIDOCAINE 50 MG/G
1 PATCH TOPICAL
Status: DISCONTINUED | OUTPATIENT
Start: 2023-09-22 | End: 2023-09-22 | Stop reason: HOSPADM

## 2023-09-22 RX ORDER — KETOROLAC TROMETHAMINE 30 MG/ML
15 INJECTION, SOLUTION INTRAMUSCULAR; INTRAVENOUS
Status: COMPLETED | OUTPATIENT
Start: 2023-09-22 | End: 2023-09-22

## 2023-09-22 RX ORDER — METHOCARBAMOL 500 MG/1
500 TABLET, FILM COATED ORAL
Status: COMPLETED | OUTPATIENT
Start: 2023-09-22 | End: 2023-09-22

## 2023-09-22 RX ADMIN — KETOROLAC TROMETHAMINE 15 MG: 30 INJECTION, SOLUTION INTRAMUSCULAR; INTRAVENOUS at 12:09

## 2023-09-22 RX ADMIN — METHOCARBAMOL 500 MG: 500 TABLET ORAL at 02:09

## 2023-09-22 RX ADMIN — IOHEXOL 100 ML: 350 INJECTION, SOLUTION INTRAVENOUS at 03:09

## 2023-09-22 RX ADMIN — DIPHENHYDRAMINE HYDROCHLORIDE 25 MG: 50 INJECTION, SOLUTION INTRAMUSCULAR; INTRAVENOUS at 03:09

## 2023-09-22 RX ADMIN — ACETAMINOPHEN 1000 MG: 500 TABLET ORAL at 02:09

## 2023-09-22 RX ADMIN — LIDOCAINE 1 PATCH: 50 PATCH TOPICAL at 12:09

## 2023-09-22 NOTE — ED PROVIDER NOTES
Encounter Date: 9/22/2023       History     Chief Complaint   Patient presents with    Flank Pain     Right flank pain, worsens with movement     HPI  Patient is a 58-year-old female with history on chronic anticoagulation with Xarelto, asthma, history of prolonged QT, permanent pacemaker placement, hypertension, seizures, prior CVA who presents for pain in her right upper flank and right lower thoracic back.  Symptoms have been persistent for the last 2 weeks.  She initially thought that it was from sitting in a chair that was uncomfortable while her daughter was admitted in the hospital.  She states that the pain is persistent.  Pain is nonradiating.  No associated substernal chest pain or shortness of breath.  No associated abdominal discomfort, nausea, vomiting, diarrhea, constipation.  She denies urinary symptoms like hematuria and dysuria.  She denies trauma to the area.  She thinks that movement might make the area hurt slightly worse.  She said that she followed up with her primary care doctor for this and had a negative chest x-ray as she was concerned that she could have had a pneumonia given that she had recent cough and congestion.  She is not complaining of any respiratory symptoms today and denies recent fever, cough, congestion, sore throat.    Review of patient's allergies indicates:   Allergen Reactions    Aspirin Hives    Imitrex [sumatriptan] Palpitations    Penicillins Hives and Swelling    Shellfish containing products Anaphylaxis     seafood    Reglan [metoclopramide hcl] Other (See Comments)     Parkinsonism      Past Medical History:   Diagnosis Date    Anticoagulant long-term use     Anxiety     Asthma     Atrial fibrillation     Brain anoxic injury     Cervicalgia 8/28/2014    CHI (closed head injury) 2/19/2013    Convulsion 5/30/2015    Decreased ROM of left shoulder 4/12/2017    Defibrillator activation 2013    Depression     Heart block     History of sudden cardiac arrest 2/2013    PEA  arrest with subsequent long-QT    Hx of psychiatric care     Hypertension     Left atrial enlargement 4/11/2018    Pacemaker 2013    Paresthesia 11/1/2013    Prolonged Q-T interval on ECG 2/8/2013    Psychiatric problem     Seizures     Sleep difficulties     Stroke     weakness lt side    Therapy     Thyroid disease     Upper airway resistance syndrome 2/21/2017     Past Surgical History:   Procedure Laterality Date    breast reduction      CARDIAC DEFIBRILLATOR PLACEMENT      CARDIAC DEFIBRILLATOR PLACEMENT      CARPAL TUNNEL RELEASE Right     COLONOSCOPY N/A 5/7/2019    Procedure: COLONOSCOPY;  Surgeon: Juan Coffey MD;  Location: [facility] (2ND FLR);  Service: Endoscopy;  Laterality: N/A;  per DR. Bustamante Pt to have balloon with DR. Coffey due to excesive looping, could not reach cecum - see telephone encounter 3/8/19 and last procedure report dated 6/24/14/ Per Piedad schedule as 90 min case- ER[facility]  pacemaker/AICD Boitronik/   per , ok to hold Xareltox 2 days    COLONOSCOPY N/A 6/2/2022    Procedure: COLONOSCOPY;  Surgeon: Juan Coffey MD;  Location: [facility] (2ND FLR);  Service: Endoscopy;  Laterality: N/A;  combined orders    ESOPHAGOGASTRODUODENOSCOPY N/A 6/2/2022    Procedure: EGD (ESOPHAGOGASTRODUODENOSCOPY);  Surgeon: Juan Coffey MD;  Location: [facility] (2ND FLR);  Service: Endoscopy;  Laterality: N/A;  BMI 54; pt requests 1st available [facility]  Fully vaccinated, Hold Xarelto x 2 days per Dr. Mejia and Plavix x 5 days per Dr. Grossman see telephone encounter 4/25/22, Biotronik PM/AICD, prep instr portal and mailed -ml    HERNIA REPAIR      HIATAL HERNIA REPAIR      INSERT / REPLACE / REMOVE PACEMAKER  10/2017    CRT-D upgrade    REVISION OF IMPLANTABLE CARDIOVERTER-DEFIBRILLATOR (ICD) ELECTRODE LEAD PLACEMENT Left 12/7/2020    Procedure: REVISION, INSERTION, ELECTRODE LEAD, ICD;  Surgeon: Avelino Mejia MD;  Location: [facility];  Service: Cardiology;  Laterality: Left;    REVISION OF SKIN  POCKET FOR CARDIOVERTER-DEFIBRILLATOR  12/7/2020    Procedure: Revision, Skin Pocket, For Cardioverter-Defibrillator;  Surgeon: Avelino Mejia MD;  Location: [facility] EP LAB;  Service: Cardiology;;    TOTAL REDUCTION MAMMOPLASTY      TRANSESOPHAGEAL ECHOCARDIOGRAPHY N/A 12/7/2020    Procedure: ECHOCARDIOGRAM, TRANSESOPHAGEAL;  Surgeon: Ivory Goodman MD;  Location: [facility] EP LAB;  Service: Cardiology;  Laterality: N/A;    TRANSESOPHAGEAL ECHOCARDIOGRAPHY N/A 12/7/2020    Procedure: ECHOCARDIOGRAM, TRANSESOPHAGEAL;  Surgeon: Patrick Diagnostic Provider;  Location: [facility] OR 2ND FLR;  Service: Cardiology;  Laterality: N/A;    TRIGGER FINGER RELEASE Left 8/2/2019    Procedure: RELEASE, TRIGGER FINGER left middle;  Surgeon: Matt Pugh Jr., MD;  Location: [facility] OR;  Service: Plastics;  Laterality: Left;    TRIGGER FINGER RELEASE Right 2/11/2020    Procedure: RELEASE, TRIGGER FINGER middle;  Surgeon: Matt Pugh Jr., MD;  Location: [facility];  Service: Plastics;  Laterality: Right;    TUBAL LIGATION       Family History   Problem Relation Age of Onset    Hypertension Mother     Glaucoma Maternal Grandmother     Glaucoma Paternal Grandmother     COPD Other     Heart failure Other      Social History     Tobacco Use    Smoking status: Never     Passive exposure: Never    Smokeless tobacco: Never   Substance Use Topics    Alcohol use: Yes     Comment: wine, socially    Drug use: No     Review of Systems  A full review of systems was obtained, see HPI for pertinent positives.    Physical Exam     Initial Vitals [09/22/23 1042]   BP Pulse Resp Temp SpO2   106/68 74 16 98.2 °F (36.8 °C) 98 %      MAP       --         Physical Exam  Constitutional: No acute distress, well-appearing  HENT: Normocephalic, atraumatic  Spine:  CT or L-spine tenderness  Respiratory: Non-labored, lungs clear  Cardiovascular: Well perfused, normal rate, regular rhythm  Gastrointestinal: Soft, non-tender, non-distended  Genitourinary:  No CVA  tenderness  Integumentary: Warm and dry, no rash  Musculoskeletal: No deformity, mildly tender to palpation to the right of the thoracic spine overlying the diaphragm  Neurological: Awake and alert, normal motor and sensation throughout all extremities  Psychiatric: Cooperative     ED Course   Procedures  Labs Reviewed   URINALYSIS, REFLEX TO URINE CULTURE - Abnormal; Notable for the following components:       Result Value    Bilirubin (UA) 1+ (*)     All other components within normal limits    Narrative:     Specimen Source->Urine   CBC W/ AUTO DIFFERENTIAL - Abnormal; Notable for the following components:    WBC 3.08 (*)     RBC 3.65 (*)     Hemoglobin 10.9 (*)     Hematocrit 33.9 (*)     RDW 16.5 (*)     Gran # (ANC) 1.4 (*)     All other components within normal limits   COMPREHENSIVE METABOLIC PANEL - Abnormal; Notable for the following components:    Glucose 121 (*)     Total Protein 9.7 (*)     Albumin 2.9 (*)     Anion Gap 5 (*)     All other components within normal limits   TROPONIN I   B-TYPE NATRIURETIC PEPTIDE        ECG Results              EKG 12-lead (Final result)  Result time 09/22/23 14:11:04      Final result by Interface, Lab In [facility] (09/22/23 14:11:04)                   Narrative:    Test Reason : R10.9,    Vent. Rate : 065 BPM     Atrial Rate : 065 BPM     P-R Int : 158 ms          QRS Dur : 158 ms      QT Int : 462 ms       P-R-T Axes : 051 084 092 degrees     QTc Int : 480 ms    Atrial-sensed ventricular-paced rhythm  Abnormal ECG  When compared with ECG of 17-MAY-2023 14:32,  Premature ventricular complexes No longer present    Confirmed by Neil Ceron MD ([id number]) on 9/22/2023 2:10:57 PM    Referred By: [email or web address]   SELF           Confirmed By:Neil Ceron MD                                  Imaging Results              CTA Chest Abdomen Pelvis (Final result)  Result time 09/22/23 16:09:09      Final result by Avelino Barr MD (09/22/23 16:09:09)                   Impression:       CTA of the chest, abdomen and pelvis shows no acute abnormalities.      Electronically signed by: Avelino Barr MD  Date:    09/22/2023  Time:    16:09               Narrative:    EXAMINATION:  CTA CHEST ABDOMEN PELVIS    CLINICAL HISTORY:  Aortic aneurysm, known or suspected;    TECHNIQUE:  Pre and postcontrast CTA of the chest, abdomen and pelvis were obtained after administration of 100 cc of Omni 350 IV contrast.    COMPARISON:  None    FINDINGS:  There is a left-sided AICD.  Soft tissue structures at the base of the neck show no significant abnormalities.    There is no abnormal mediastinal or axillary lymph node enlargement.  Heart is not enlarged.  No significant pericardial fluid.    Examination is not tailored for evaluation of pulmonary arteries.  No central pulmonary thrombo embolus is suggested.    Trachea and central airways are patent.  Lungs are expanded and show no significant consolidation, large effusion or pneumothorax.  There is respiratory motion artifact.    Esophagus is normal in caliber and course with moderate hiatal hernia.    There is upper abdominal respiratory motion artifact.    Liver shows no focal abnormalities.  Gallbladder shows no radiopaque stones or inflammatory changes.  No biliary ductal dilatation.  Aside from the hiatal hernia, stomach shows no significant abnormalities.  Spleen, pancreas and adrenal glands show no significant abnormalities.    Kidneys show no nephrolithiasis or hydronephrosis.  Kidneys enhance homogeneously.  Urinary bladder is nondistended.  Pelvis and adnexal structures show no significant abnormalities.    Small and large bowel show no acute inflammation or obstruction.  There are few scattered colonic diverticuli without acute diverticulitis.  Appendix is normal.  No free air or ascites.    No abnormal lymph node enlargement.    Vascular: Aorta is normal in caliber.  There is a common origin of the innominate left common carotid artery.  Mild  atherosclerosis of the aorta.  No evidence of aneurysm or dissection.  Major aortic branch vessels are patent.  There is iliac atherosclerosis.    Osseous structures show degenerative changes no acute osseous abnormalities.                                       Medications   LIDOcaine 5 % patch 1 patch (1 patch Transdermal Patch Applied 9/22/23 1259)   ketorolac injection 15 mg (15 mg Intravenous Given 9/22/23 1255)   acetaminophen tablet 1,000 mg (1,000 mg Oral Given 9/22/23 1412)   methocarbamoL tablet 500 mg (500 mg Oral Given 9/22/23 1412)   diphenhydrAMINE injection 25 mg (25 mg Intravenous Given 9/22/23 1517)   iohexoL (OMNIPAQUE 350) injection 100 mL (100 mLs Intravenous Given 9/22/23 1534)     Medical Decision Making  Patient is a 58-year-old female with multiple comorbidities who presents for pain in her right upper flank and right lower thoracic spinal area.  The pain seems to be just over the diaphragm would be.  It is difficult to tell if it is more flank pain versus above the diaphragm.  Pain has been persistent.  She is unsure if it is reproducible with movements.  She might have some faint tenderness to palpation in the area.  I do not see any overlying bruising or rashes.  She has no abdominal discomfort and no chest pain or shortness of breath.  Overall her vitals and exam are reassuring.  Will obtain CTA to evaluate for differentials for flank pain like kidney stones but also to assess for pneumonia, PE or other etiologies that could be causing issues above the diaphragm.  Labs and pain control ordered.  If workup reassuring, anticipate discharge home with close primary care follow up.    On re-evaluation, patient resting comfortably.  CTA reviewed and has no acute abnormalities.  I believe her pain is likely musculoskeletal in etiology.  She has improved with symptomatic treatment here in the emergency department.  Labs reviewed and reassuring. Plan to send the patient home with Robaxin and  Lidoderm patches. I counseled the patient on return precautions and advised her to follow up with her primary care doctor.      Amount and/or Complexity of Data Reviewed  Labs: ordered.  Radiology: ordered.    Risk  OTC drugs.  Prescription drug management.               ED Course as of 09/22/23 1729   Fri Sep 22, 2023   1234 EKG with atrial sensed, ventricular paced rhythm, no STEMI. [NN]   1615 CTA chest abdomen pelvis negative. [NN]      ED Course User Index  [NN] Britta Brown MD                      Clinical Impression:   Final diagnoses:  [R10.9] Flank pain  [M54.9] Back pain, unspecified back location, unspecified back pain laterality, unspecified chronicity (Primary)        ED Disposition Condition    Discharge Stable          ED Prescriptions       Medication Sig Dispense Start Date End Date Auth. Provider    methocarbamoL (ROBAXIN) 500 MG Tab Take 1 tablet (500 mg total) by mouth 3 (three) times daily. for 5 days 15 tablet 9/22/2023 9/27/2023 Britta Brown MD    LIDOcaine (LIDODERM) 5 % Place 1 patch onto the skin once daily. Remove & Discard patch within 12 hours or as directed by MD for 7 days 7 patch 9/22/2023 9/29/2023 Britta Brown MD          Follow-up Information       Follow up With Specialties Details Why Contact Info    Tiffany Mina MD Internal Medicine Schedule an appointment as soon as possible for a visit   [address]  [place] [address]  [phone]      [facility] - Emergency Dept Emergency Medicine  As needed, If symptoms worsen [address] [facility] [address]  [phone]             Britta Brown MD  09/22/23 1703       Britta Brown MD  09/22/23 1704       Britta Brown MD  09/22/23 4959

## 2023-09-22 NOTE — DISCHARGE INSTRUCTIONS
You were seen today for right upper back pain.  Your imaging today did not show any abnormalities.  Your pain is likely due to a muscle strain.  You can use the Lidoderm patches and take the muscle relaxer as prescribed.  Please do not take the Robaxin prescribed you today with any other sedating medications or other muscle relaxers like tizanidine that you have been previously prescribed.  If your symptoms worsen or you develop chest pain, shortness of breath or any other worsening symptoms or concerns, you should return to the emergency department for re-evaluation.  Please follow-up with your primary care doctor within the next week for re-evaluation.

## 2023-09-22 NOTE — ED NOTES
Patient identifiers verified and correct for  Ms Chawla  C/C:  Right upper back pain SEE NN  APPEARANCE: awake and alert in NAD. PAIN  10/10  SKIN: warm, dry and intact. No breakdown or bruising.  MUSCULOSKELETAL: Patient moving all extremities spontaneously, no obvious swelling or deformities noted. Ambulates independently.  RESPIRATORY: Denies shortness of breath.Respirations unlabored.   CARDIAC: Denies CP, 2+ distal pulses; no peripheral edema  ABDOMEN: S/ND/NT, Denies nausea  : voids spontaneously, denies difficulty  Neurologic: AAO x 4; follows commands equal strength in all extremities; denies numbness/tingling. Denies dizziness  Henrik new weknaess

## 2023-09-26 ENCOUNTER — PATIENT MESSAGE (OUTPATIENT)
Dept: INTERNAL MEDICINE | Facility: CLINIC | Age: 58
End: 2023-09-26

## 2023-09-26 ENCOUNTER — E-VISIT (OUTPATIENT)
Dept: INTERNAL MEDICINE | Facility: CLINIC | Age: 58
End: 2023-09-26
Payer: MEDICARE

## 2023-09-26 DIAGNOSIS — N30.00 ACUTE CYSTITIS WITHOUT HEMATURIA: Primary | ICD-10-CM

## 2023-09-26 PROCEDURE — 99421 PR E&M, ONLINE DIGIT, EST, < 7 DAYS, 5-10 MINS: ICD-10-PCS | Mod: ,,, | Performed by: INTERNAL MEDICINE

## 2023-09-26 PROCEDURE — 99421 OL DIG E/M SVC 5-10 MIN: CPT | Mod: ,,, | Performed by: INTERNAL MEDICINE

## 2023-09-26 RX ORDER — NITROFURANTOIN 25; 75 MG/1; MG/1
100 CAPSULE ORAL 2 TIMES DAILY
Qty: 14 EACH | Refills: 0 | Status: SHIPPED | OUTPATIENT
Start: 2023-09-26 | End: 2023-10-18

## 2023-09-26 NOTE — PROGRESS NOTES
Patient ID: Amna Chawla is a 58 y.o. female.    Chief Complaint: Urinary Tract Infection (Entered automatically based on patient selection in Patient Portal.)    The patient initiated a request through [email or web address] on 9/26/2023 for evaluation and management with a chief complaint of Urinary Tract Infection (Entered automatically based on patient selection in Patient Portal.)     I evaluated the questionnaire submission on 09/26/2023  Pt c cloudy urine, dysuria, foul smelling urine and back pain.   No fever.      [facility] P[facility] Evisit Uti Questionnaire    9/26/2023  8:03 AM CDT - Filed by Patient   Do you agree to participate in an E-Visit? Yes   If you have any of the following problems, please present to your local ER or call 911:  I acknowledge   What is the main issue that you would like for your doctor to address today? Cloudy urien, pain in my left side and back pain   Are you able to take your vital signs? No   What symptoms do you currently have? Pain while passing urine;  Change in urine appearance or smell   When did your symptoms first appear? 9/25/2023   List what you have done or taken to help your symptoms. Nothing   Please indicate whether you have had the following symptoms during the past 24 hours     Urgent urination (a sudden and uncontrollable urge to urinate) Mild   Frequent urination of small amounts of urine (going to the toilet very often) Mild   Burning pain when urinating None   Incomplete bladder emptying (still feel like you need to urinate again after urination) Mild   Pain not associated with urination in the lower abdomen below the belly button) Moderate   What does your urine look like? Cloudy   Blood seen in the urine None   Flank pain (pain in one or both sides of the lower back) Moderate   Abnormal Vaginal Discharge (abnormal amount, color and/or odor) Mild   Discharge from the urethra (urinary opening) without urination None   High body temperature/fever? None-<99.5   Please rate how much  discomfort you have experience because of the symptoms in the past 24 hours: Moderate   Please indicate how the symptoms have interfered with your every day activities/work in the past 24 hours: Mild   Please indicate how these symptoms have interfered with your social activities (visiting people, meeting with friends, etc.) in the past 24 hours? Mild   Are you a diabetic? Maybe   Please indicate whether you have the following at the time of completion of this questionnaire: None of the above   Provide any information you feel is important to your history not asked above    Please attach any relevant images or files (if you have performed a home test for UTI, please submit a photo of results)          Recent Labs Obtained:  Admission on 09/22/2023, Discharged on 09/22/2023   Component Date Value Ref Range Status    Specimen UA 09/22/2023 Urine, Clean Catch   Final    Color, UA 09/22/2023 Yellow  Yellow, Straw, Linda Final    Appearance, UA 09/22/2023 Clear  Clear Final    pH, UA 09/22/2023 6.0  5.0 - 8.0 Final    Specific Gravity, UA 09/22/2023 1.010  1.005 - 1.030 Final    Protein, UA 09/22/2023 Negative  Negative Final    Comment: Recommend a 24 hour urine protein or a urine   protein/creatinine ratio if globulin induced proteinuria is  clinically suspected.      Glucose, UA 09/22/2023 Negative  Negative Final    Ketones, UA 09/22/2023 Negative  Negative Final    Bilirubin (UA) 09/22/2023 1+ (A)  Negative Final    Comment: Positive urine bilirubin is not confirmed. Correlate with   serum bilirubin and clinical presentation.      Occult Blood UA 09/22/2023 Negative  Negative Final    Nitrite, UA 09/22/2023 Negative  Negative Final    Leukocytes, UA 09/22/2023 Negative  Negative Final    WBC 09/22/2023 3.08 (L)  3.90 - 12.70 K/uL Final    RBC 09/22/2023 3.65 (L)  4.00 - 5.40 M/uL Final    Hemoglobin 09/22/2023 10.9 (L)  12.0 - 16.0 g/dL Final    Hematocrit 09/22/2023 33.9 (L)  37.0 - 48.5 % Final    MCV 09/22/2023  93  82 - 98 fL Final    MCH 09/22/2023 29.9  27.0 - 31.0 pg Final    MCHC 09/22/2023 32.2  32.0 - 36.0 g/dL Final    RDW 09/22/2023 16.5 (H)  11.5 - 14.5 % Final    Platelets 09/22/2023 229  150 - 450 K/uL Final    MPV 09/22/2023 12.0  9.2 - 12.9 fL Final    Immature Granulocytes 09/22/2023 0.0  0.0 - 0.5 % Final    Gran # (ANC) 09/22/2023 1.4 (L)  1.8 - 7.7 K/uL Final    Immature Grans (Abs) 09/22/2023 0.00  0.00 - 0.04 K/uL Final    Comment: Mild elevation in immature granulocytes is non specific and   can be seen in a variety of conditions including stress response,   acute inflammation, trauma and pregnancy. Correlation with other   laboratory and clinical findings is essential.      Lymph # 09/22/2023 1.3  1.0 - 4.8 K/uL Final    Mono # 09/22/2023 0.4  0.3 - 1.0 K/uL Final    Eos # 09/22/2023 0.0  0.0 - 0.5 K/uL Final    Baso # 09/22/2023 0.00  0.00 - 0.20 K/uL Final    nRBC 09/22/2023 0  0 /100 WBC Final    Gran % 09/22/2023 43.9  38.0 - 73.0 % Final    Lymph % 09/22/2023 42.5  18.0 - 48.0 % Final    Mono % 09/22/2023 13.6  4.0 - 15.0 % Final    Eosinophil % 09/22/2023 0.0  0.0 - 8.0 % Final    Basophil % 09/22/2023 0.0  0.0 - 1.9 % Final    Differential Method 09/22/2023 Automated   Final    Sodium 09/22/2023 137  136 - 145 mmol/L Final    Potassium 09/22/2023 3.8  3.5 - 5.1 mmol/L Final    Chloride 09/22/2023 107  95 - 110 mmol/L Final    CO2 09/22/2023 25  23 - 29 mmol/L Final    Glucose 09/22/2023 121 (H)  70 - 110 mg/dL Final    BUN 09/22/2023 8  6 - 20 mg/dL Final    Creatinine 09/22/2023 0.9  0.5 - 1.4 mg/dL Final    Calcium 09/22/2023 8.7  8.7 - 10.5 mg/dL Final    Total Protein 09/22/2023 9.7 (H)  6.0 - 8.4 g/dL Final    Albumin 09/22/2023 2.9 (L)  3.5 - 5.2 g/dL Final    Total Bilirubin 09/22/2023 0.3  0.1 - 1.0 mg/dL Final    Comment: For infants and newborns, interpretation of results should be based  on gestational age, weight and in agreement with clinical  observations.    Premature Infant  recommended reference ranges:  Up to 24 hours.............<8.0 mg/dL  Up to 48 hours............<12.0 mg/dL  3-5 days..................<15.0 mg/dL  6-29 days.................<15.0 mg/dL      Alkaline Phosphatase 09/22/2023 58  55 - 135 U/L Final    AST 09/22/2023 17  10 - 40 U/L Final    ALT 09/22/2023 16  10 - 44 U/L Final    eGFR 09/22/2023 >60.0  >60 mL/min/1.73 m^2 Final    Anion Gap 09/22/2023 5 (L)  8 - 16 mmol/L Final    Troponin I 09/22/2023 <0.006  0.000 - 0.026 ng/mL Final    Comment: The reference interval for Troponin I represents the 99th percentile   cutoff   for our facility and is consistent with 3rd generation assay   performance.      BNP 09/22/2023 21  0 - 99 pg/mL Final    Values of less than 100 pg/ml are consistent with non-CHF populations.       Encounter Diagnosis   Name Primary?    Acute cystitis without hematuria Yes        No orders of the defined types were placed in this encounter.     Medications Ordered This Encounter   Medications    nitrofurantoin, macrocrystal-monohydrate, (MACROBID) 100 MG capsule     Sig: Take 1 capsule (100 mg total) by mouth 2 (two) times daily. for 7 days     Dispense:  14 each     Refill:  0        No follow-ups on file.  Cx classic for UTI, will tx c macrobid and advised to increase hydration.  Otc Azo also okay to take for sx of dysuria if needed.    E-Visit Time Tracking:    Day 1 Time (in minutes): 5     Total Time (in minutes): 5

## 2023-09-28 ENCOUNTER — PROCEDURE VISIT (OUTPATIENT)
Dept: NEUROLOGY | Facility: CLINIC | Age: 58
End: 2023-09-28
Payer: MEDICARE

## 2023-09-28 VITALS
BODY MASS INDEX: 50.02 KG/M2 | DIASTOLIC BLOOD PRESSURE: 76 MMHG | HEIGHT: 64 IN | HEART RATE: 63 BPM | SYSTOLIC BLOOD PRESSURE: 116 MMHG | WEIGHT: 293 LBS

## 2023-09-28 DIAGNOSIS — G43.711 CHRONIC MIGRAINE WITHOUT AURA, WITH INTRACTABLE MIGRAINE, SO STATED, WITH STATUS MIGRAINOSUS: Primary | ICD-10-CM

## 2023-09-28 PROCEDURE — 64615 CHEMODENERV MUSC MIGRAINE: CPT | Mod: S$PBB,,, | Performed by: PSYCHIATRY & NEUROLOGY

## 2023-09-28 PROCEDURE — 64615 PR CHEMODENERVATION OF MUSCLE FOR CHRONIC MIGRAINE: ICD-10-PCS | Mod: S$PBB,,, | Performed by: PSYCHIATRY & NEUROLOGY

## 2023-09-28 PROCEDURE — 64615 CHEMODENERV MUSC MIGRAINE: CPT | Mod: PBBFAC | Performed by: PSYCHIATRY & NEUROLOGY

## 2023-09-28 NOTE — PROCEDURES
Follow up:  Migraines are stable     Prior note:   Reports worsening LBP after COVID in Jan   On antibiotics for bronchitis     Prior note:   Reports nausea   Knees buckle frequently   No benefit from new shoes   Not Using cane     Prior note:   Reports overall worse HA   Feels tremors in whole body lasting 3-4 hrs   Tried ativan - helped for a few hours     Prior note:   She is grappling with grief of loss of her sister   Pending grief counseling      Prior note:   S/p course of prednisone for GI allergic reaction (scratch throat - seafood related)     Prior note:   Migraine Hz increased during storm - almost daily   One episode of lightheadedness. No SOB, CP       Prior note:   Reports episode of lightheadedness 2 days ago      Prior note:   S/p COVID vaccine      Prior note:   Reports more physical fatigue   taking 2 naps a day   L sided severe migraine lasted 2 days. Took ubrelvy, motrin, zanaflex      Prior note:   3 days of severe L sided HA + nausea   Gradual onset   Ubrelvy, zanaflex, flurbiprofen - not helping   Vision - nml      Prior note:   HA stable   Try Ubrelvy again      Prior note:   independent left and right parietal ice prick HA      Prior note:   HA much improved   Only 2 since last visit      Prior note:   HA are more frequent   Taking 1600 mg motrin + zanaflex   Went to ER recently      Prior note:   HA overall stable in Hz and intensity   Reports a wearing off effect of BOTOX since last week   Taking zanaflex 8 mg TID        Prior note:   HA started 12/24   She feels BOTOX wore off last week   Reports GI distress from taking to many motrin      Prior note:   4/week HA - L vertex   Jabs - vertex either sides      She reports migraine that woke her up in the morning - associated with L side facial droop      Prior note:   She gets acceptable relief for 2.5 months after BOTOX      Prior note:   No recurrent stroke symptoms   starting having whole body tremors since this AM. Took 1 tablet of  lorazepam   Last night had a HA, took trazodone       Admit Date: 4/11/2018  2:03 PM   Discharge Date and Time: 4/12/2018  8:30 PM   History of Present Illness: Ms. Chawla is a 53 yo F with afib on Eliquis (last dose this am), prior cardiac arrest with associated acute ischemic stroke with residual mild L sided weakness, CAD with ICD in situ, HTN, and HLD who presented with acute R sided weakness and sensation changes.  She originally called EMS for palpitations, but developed acute right sided facial droop and RUE weakness at 1:40pm today, after EMS had arrived.  She denies changes to speech or vision.  SBP en route 200/100.  She is ineligible for tPA 2/2 Eliquis use.  She is not suspected to have an LVO.  She will be admitted to [facility] for observation overnight.     Hospital Course (synopsis of major diagnoses, care, treatment, and services provided during the course of the hospital stay): Ms. Chawla was admitted for acute stroke workup. Pt with pacemaker so unable to obtain MRI Brain; CTA H/N demonstrated no evidence acute infarction, though did exhibit noncalcified plaquing of carotid bifurcations and proximal ICAs with < 50% stenosis, so Plavix was added to pt's daily home regimen. Concerned for TIA at this time though Migraine very high on differential due to pt's hx headaches, including presently this admission. Hypertensive urgency/emergency also considered due to pt's very elevated levels with EMS. Per chart review, Eliquis was a new medication since 4/3/18 (had switched from Coumadin), however staff Faraz concerned that pt had a potential event while on Eliquis. She wished to switch to Pradaxa as an alternative DOAC (as it has an option for reversal), however changed to Xarelto per pt's insurance coverage.   While admitted, pt given cocktail for HA which she has received previously at the infusion clinic - IV Magnesium, IM Toradol, 2mg IV Morphine, 500cc NS bolus (IV Benadryl not included this time as pt  had received a dose earlier that day prior to contrast imaging.) HA improved somewhat and pt was encouraged to follow up with Dr. Cortez for continued management of botox injections, etc. At that time, pt also found with hyponatremia; d/c'd Clorthalidone and plan was made for pt to [facility] 4[facility]16/18 for repeat CMP to evaluate Na level and BP check since discontinuing this medication.  Ms. Chawla was evaluated and treated by PT, OT, and SLP who recommend d/c with outpatient PT and OT. She was discharged home 4/12/18 with [facility] follow-up Monday      Prior note:   2 weeks ago BOTOX effect wore off   Used up all her percocet   3 wks h/o:   Reports frequent falls - falling forward, no LOC, no injuries, able to protect falls by hands   triggers - unknown   Also occ stumbling   Dragging L foot   No Pain in back or legs   No numbness or weakness in legs   No B/B incontinence   No lightheadedness      Prior note:    Flare up of TMJ and HA during daughter's wedding   NSAIDS helped   2 weeks ago BOTOX effect wore off      Prior note:   2 weeks ago BOTOX effect wore off   Has severe spasm and pain in the traps catalina   Weather change has provoked severe migraine + nausea   She started coumadin       Prior note:   3 weeks ago BOTOX effect wore off   She reports severe daily HA    During BOTOX periods she feels she  her HA. Much less intense      Prior note:   The patient is a 51-year-old female who [facility]. Since her last visit, she reports growing frustration   about the fact that nothing has helped her with regards to her headaches. She   continues to experience daily headaches. She takes mefenamic acid and   tizanidine every night, which only provides her two hours of relief. She is   unable to sleep because of the refractory headaches. She is incapacitated   because of severe headaches. She reports minimal relief with infusions  "that she  gets on a periodic basis. She does report an improvement overall with the   Botox. However, this effect has worn off in the last two weeks. She also   reports an episode wherein she fell with loss of consciousness, which was not   provoked. As a result, she injured her ankle and is currently wearing a boot.      Prior note:   since last week - Reports vertigo for 6 - 7 minutes upto 3 times daily   Nearly daily severe HA - no response to ponstel , compazine   She is late for her BOTOX - last 2 weeks were painful       Follow up:   R sided Headache is constant max at TMJ on R   Prior note:   She reports new episodes of R face tingling. Sh also reports 2 episodes of blurred vision lasting 15 minutes. These hapended while watching TV. Her pain remains unchanged   Follow up:   2 days of severe HA during Thanksgiving   Pounding bifrontal region   Pain worse over L eye brow   Follow up:   Reports bifrontal severe HA (worse on L) with no nausea with sudden onset . Occurs daily   NO note:  I found no papilledema or other changes to suggest elevated intracranial pressure or other alternative etiology for her headaches. Repeat exam in two years.  HA are less frequent and less intense.   (R levator scapula spasm)   symptoms better with lateral flexion to L   Prior note:   She still has daily HA with severe sharp stabbing pain behind L eye lasting for 15 sec   Prior note:   Daily pain. 2/week - nausea.  Shu Lares RN at 9/17/2014 4:53 PM  Pt presented to clinic for medical advice. Pt is seen by Dr. Paredes and Dr. Cortez.  1) She went ER on 9/13/14 for a migraine. She was given Reglan, Benadryl, MSO4 and IVF. A couple days later she developed an all over body "tremor". She states "I think I have Parkinson's". - Per Dr. Paredes, medication related tremor (Reglan & Benadryl). Informed her it should wear off in a few days. If not, she is to call and let us know.  2) Headaches - triggered by insomnia.   Current " "meds:  Ketoprofen - she took it once and said her "throat closed up" and she's not supposed to have anything with aspirin in it.  Nortriptyline - she was taking it at night, but it didn't help her sleep, so she stopped it.  Per Dr. Cortez, discontinue Ketoprofen and Nortriptyline. Start Effexor XR 37.5mg QD, tizanidine 4mg BID PRN headache and Klonopin 0.25 - 0.5mg QHS PRN. Will schedule pt for sphenocath procedure within the next week.   Prior note:   48 yo woman with history of HTN and asthma, both reportedly controlled, in hospital early 2013 after "passed out" and became unresponsive. CPR in the field for 8 minutes until EMS arrived. Per ED note, on arrival she was found to be in PEA, given epinephrine. Vfib/Vtach was achieved which was shocked x1. Patient converted to sinus tachycardia after shock. I do not know the time course for the above treatment. On arrival to facility patient was in sinus tachycardia with strong pulses, intubated and unresponsive. ET tube placement was confirmed with direct laryngoscopy and good bilateral breath sounds were heard after the tube was pulled back 2 cm. Reported to have "withdrawal" movements in ER during stabilization, then sedated and cooling protocol initiated. Had remarkable   recovery and first seen in clinic in April 2013.   Headache history: cluster   Age of onset - 2012   Location - behind L eye   Nature of pain - sharp   Prodrome - no   Aura - No   Duration of headache - 6-7 min   Time to peak intensity -   Pain scale - range of intensity - 9/10   Frequency - daily   Status Migrainosus history - 1-3 days   Time of day of most headaches- anytime   Associated symptoms with the headache:   Red eyes   swelling of L face   Headache history: Migraine   Age of onset - 2012   Location - bifrontal   Nature of pain - Pounding   Prodrome - no   Aura - No   Duration of headache - > 4 hrs   Time to peak intensity -   Pain scale - range of intensity - 9/10   Frequency - 5/week " "  Status Migrainosus history - 1-3 days   Time of day of most headaches- anytime   Associated symptoms with the headache:   Meningeal symptoms - photophobia, phonophobia, exercise intolerance +   Nausea/vomitting +   Nasal drainage   Visual blurriness +   Pallor/flushing   Dizziness   Vertigo   Confusion   Impaired concentration +   Pain worsened with physical activity +   Neck pain +   Symptoms of increased intracranial pressure:   Whooshing sounds - no   Visual spots/blotches - no   Headache Triggers: unknown   Other comorbid conditions:   Anxiety - no   Motion sickness symptom - no       Treatment history:   Resolution of headache with sleep - yes       Meds tried:   Trigger points involvin/9/15 helped for 1 day   Site: Right   Levator scapula   Pharmacological agent:   0.5% Sensorcaine solution   No complications were noted.  Supraorbital block - helped for 2 days   Tylenol - not help   imitrex - chest tightness   alleve - help   topamax - not helping   Neurontin - not helping   Paxil, Elavil - not help   seroquel - nightmare   Ketoprofen - she took it once and said her "throat closed up" and she's not supposed to have anything with aspirin in it.  Flurbiprofen - constipation   Nortriptyline - she was taking it at night, but it didn't help her sleep, so she stopped it.  reglan - parkinsonism   zanaflex - help   Toradol 30 mg IM inj at home - too expensive   BOTOX round #1 9/3/15 - helped (R levator scapula spasm)   Round #2 12/3/15 - helped   Round#3 3/316 - helped   Round #4 16 - helped   BOTOX#5 9/15/16 - helped     BOTOX#6 - 16 - helped   BOTOX#7 - 3/9/17 - helped    BOTOX#8 - 17 - helped    BOTOX#9 8/10/17 - helped   BOTOX#10 10/19/17 - helped   BOTOX#11 17 - helped   BOTOX#12 3/7/18 - helped   BOTOX#13 18 - helped    BOTOX#14 18 - helped     BOTOX#15 10/19/18 - helped      BOTOX#16 18 - helped   BOTOX#17 3/13/19 - helped    BOTOX#18 19 - helped     BOTOX#19 " 8/1/19 - helped      BOTOX#20 10/11/19 - helped     BOTOX#21 12/19/19 - helped   BOTOX#22 3/10/20 - helped    BOTOX#23 6/26/20 - helped   BOTOX#24 11/12/20 - helped  BOTOX#25 1/21/21 - helped   BOTOX#26 4/22/21 - helped   BOTOX#27 7/6/21 - helped    BOTOX#28 12/10/21 - helped     BOTOX#29 5/19/22 - helped   BOTOX#30 8/25/22 - helped  BOTOX#31 12/2/22 - helped  BOTOX#32 3/1/23 - helped  BOTOX#33 7/6/23 - helped    AIMOVIG (sept 1rst week, Oct first week, Nov first wk 140 mg, Dec first wk 140 mg, Jan, Feb) no benefit   Constipation +      Can not do RFA - pt has pacemaker   Procedure: IOVERA peripheral nerve focus cold therapy (non ablative cryotherapy) 12/30/15 - not help   Indication: Cryotherapy of select peripheral nerves of the head was performed to treat chronic headaches that failed to respond to several preventive pharmacological therapies.   Sedation: None   Informed consent: The procedure, risks, benefits and options were discussed with the patient. There are no contraindications to the procedure. The patient expressed understanding and agreed to the procedure. I verify that I personally obtained the patient's consent prior to the start of the procedure.  Location:  Bilateral Supra orbital nerve (3 cycles on R and 1 cycle on L)   Bilateral Occipital nerve   3 cycles   Pain relief: Pain score reduced 10/10->0/10   9/26 Bilateral Sphenopalatine Ganglion and bilateral Maxillary Branch (Trigeminal Nerve) Block - no help   ONB - not help     georges Pagan RN at 12/31/2014 3:54 PM                           Status: Signed                                       Expand All Collapse All   HEADACHE FASTTRACK  PAIN 7/10 NAUSEA 0/10  ROUND 1: TORADOL, IMITREX, MORPHINE, BENADRYL, AND MAGNESIUM  PAIN 7/10 NAUSEA 0/10  PT STATED SHE WAS READY TO GO HOME AND GO TO SLEEP. PT D/C'ED IN [facility]                              Shannon Pagan RN at 10/5/2015 12:49 PM                           Status: Signed                                        Expand All Collapse All   HEADACHE FASTTRACK  PAIN 8/10 NAUSEA 10/10  FIRST ROUND: tORADOL, BENADRYL, COMPAZINE, IMITREX, MAGNESIUM, AND VALPROATE.  PAIN 1/10 NAUSEA 0/10  PT READY TO GO HOME AND FEELING BETTER. PT LEFT IN NAD WITH FAMILY MEMBER  TOTAL TIME: 4690-5845                         Shannon Pagan RN at 10/20/2015 3:05 PM                           Status: Signed                                       Expand All Collapse All   HEADACHE FASTTRACK  PAIN 10/10 NAUSEA 0/10  FIRST ROUND: tORADOL, BENADRYL, COMPAZINE, IMITREX, MAGNESIUM, AND VALPROATE.  BP BEING MONITORED EVERY 15 MIN AND REMAINED 170'S/80-90'S. DR CHOPRA NOTIFIED AND STATED TO MAKE SURE BP DID NOT EXCEED 180 SYSTOLIC OR PT SHOULD REPORT TO ED. BP AT 1500 183/92. DR CHOPRA NOTIFIED AND GAVE ORDER TO STOP INFUSION AND SEND PATIENT TO ED. PT BROUGHT TO ED BY INFUSION NURSE VIA WHEELCHAIR.   PAIN 8/10 NAUSEA 0/10  TOTAL TIME: 6310-3762                                                     Progress Notes                                               Shannon Pagan RN at 2/24/2016 1:36 PM       Status: Signed                  Expand All Collapse All   HEADACHE FASTTRACK  PAIN 10/10 NAUSEA 7/10  FIRST ROUND: tORADOL, BENADRYL, AND MORPHINE-BP RANGING FROM 177-203/84-92, HR 60-82  MD NOTIFIED AND GAVE ORDERS TO SEND TO ED. PT LEFT VIA W/C WITH INFUSION NURSE ON WAY TO ED.            Headache risk factors:   H/o TBI - [facility] (2013) + neck sprain   H/o Meningitis - no   F/h Aneurysms - no       Review of Systems   Constitutional: Negative.   HENT: Negative.   Eyes: Negative.   Respiratory: Negative.   Cardiovascular: Negative.   Gastrointestinal: Negative.   Endocrine: Negative.   Genitourinary: Negative.   Musculoskeletal: Negative.   Skin: Negative.   Allergic/Immunologic: Negative.   Hematological: Negative.   Psychiatric/Behavioral: insomnia      Objective:     Physical Exam   Constitutional: She is oriented to person, place, and time. She appears  well-developed.   obesity   Psychiatric: She has a normal mood and flat affect. Her behavior is normal. Judgment and thought content normal.   Headache Exam:  Optic disc is flat OU   Temples appear symmetric  No V1,V2,V3 distribution allodynia/hyperalgesia catalina   No facial swelling  No gross TMJ abnormalities   No ptosis   No conj injection   No meningismus   No neck stiffness  No C spine tenderness  Cervical facet tenderness on palpation catalina   No tender points over trapezium   catalina supraorbital nerve exit point tenderness  catalina occipital nerve exit point tenderness  No auriculotemporal nerve tenderness   Tenderness of levator scapula catalina    Gait - wide based gait                       Assessment:                              1.  Occipital neuralgia                              2.  Cervicalgia                              3.  4  5  6 Chronic migraine without aura, with intractable migraine, so stated, with status migrainosus (post traumatic) post concussive?  Depression   TMJ - R sided   Insomnia - SHIRA      Head CT  Findings: There is no evidence of acute or recent major vascular territory cerebral infarction, parenchymal hemorrhage, or intra-axial mass.  There are no extra-axial masses or abnormal fluid collections.  The ventricular system is within normal limits of size for age and shows no distortion by mass-effect or evidence of hydrocephalus.  There is no fracture or destructive osseous lesion.  The extracranial soft tissues are unremarkable.  The visualized paranasal sinuses and mastoid air cells are clear.   Impression    No acute intracranial abnormality.  ______________________________________     Electronically signed by resident: KRISSY MAYERS MD  Date: 03/14/16  Time: 17:09            Plan:                              1. Prophylactic medication -   Despiramine 25 mg AM (for dizziness) PRN  zoloft 25 mg daily    Aricept 20 mg daily   Neurontin 900 mg TID    Magnesium 200 mg daily  Topamax 200 mg BID    Clonazepam BID for anxiety PRN  On trazodone   Cont B2, B6 supplements  On bellsomra    2, Breakthrough headache - zanaflex 8 mg Q8, etodolac  Multiple alternative treatment options were offered to the patient   3. Refrain from over counter pain medications. Discussed medication overuse headache.cont Magnesium 400 mg PO BID   4. Occipital neuralgia - If medical management is ineffective may consider occipital nerve blocks.   5. I again urged the patient to keep a headache calender.    f/up Dr. Rock for TBI   Vit D supplements    f/up sleep clinic - CPAP - waiting for new machine   Cont AJOVY (April 2019- ) - gap Feb-April 2021)  No side effects      ONB 2 weeks prior to next BOTOX      Will do B12 shot during next visit     BOTOX treatment response:   Prior to initiating BOTOX, the patient had 28   migraine days per month on average. This meets criteria for chronic migraine.   After starting BOTOX, the patient experienced a reduction in  25  days per month   After starting BOTOX, the patient experienced a reduction in > 100 hours of migraine symptoms per month   After starting BOTOX, the patient experienced a 50% reduction in burden of migraine days per month   Based on this information, BOTOX is medically necessary for the management of the patient's chronic migraine.     BOTOX Injection intervals:   Patient reports a 'wearing off effect' prior to the subsequent BOTOX injections at 12 weeks. This occurs at week 9-10  Symptoms of this a 'wearing off effect' include - worsening of migraine headache frequency and intensity   Medications used during the 'wearing off effect' period include flurbiprofen, zanaflex  Based on this information, it is medically necessary for the patient to receive BOTOX therapy at an interval of every 10 weeks for the management of chronic migraine.    BOTOX was performed as an indicated therapy for intractable chronic migraine headaches given that the patient failed > 5 prophylactic  medications  Botulinum Toxin Injection Procedure   Pre-operative diagnosis: Chronic migraine   Post-operative diagnosis: Same   Procedure: Chemical neurolysis   After risks and benefits were explained including bleeding, infection, worsening of pain, damage to the areas being injected, weakness of muscles, loss of muscle control, dysphagia if injecting the head or neck, facial droop if injecting the facial area, painful injection, allergic or other reaction to the medications being injected, and the failure of the procedure to help the problem, a signed consent was obtained.   The patient was placed in a comfortable area and the sites to be treated were identified.The area to be treated was prepped three times with alcohol and the alcohol allowed to dry. Next, a 30 gauge needle was used to inject the medication in the area to be treated.   Area(s) injected:   Total Botox used: 155 Units   Botox wastage: 45 Units   Injection sites:     muscle bilaterally ( a total of 5 units divided into 2 sites)   Procerus muscle (5 units)   Frontalis muscle bilaterally (a total of 20 units divided into 4 sites)   Temporalis muscle bilaterally (a total of 50 units divided into 8 sites)   Occipitalis muscle bilaterally (a total of 30 units divided into 6 sites)   Cervical paraspinal muscles (a total of 20 units divided into 4 sites)   Trapezius muscle bilaterally (a total of 30 units divided into 6 sites)     Complications: none       RTC for the next Botox injection: 10 weeks    Procedures

## 2023-09-30 ENCOUNTER — EXTERNAL CHRONIC CARE MANAGEMENT (OUTPATIENT)
Dept: PRIMARY CARE CLINIC | Facility: CLINIC | Age: 58
End: 2023-09-30
Payer: MEDICARE

## 2023-09-30 PROCEDURE — 99490 CHRNC CARE MGMT STAFF 1ST 20: CPT | Mod: PBBFAC | Performed by: INTERNAL MEDICINE

## 2023-09-30 PROCEDURE — 99439 CHRNC CARE MGMT STAF EA ADDL: CPT | Mod: S$PBB,,, | Performed by: INTERNAL MEDICINE

## 2023-09-30 PROCEDURE — 99490 CHRNC CARE MGMT STAFF 1ST 20: CPT | Mod: S$PBB,,, | Performed by: INTERNAL MEDICINE

## 2023-09-30 PROCEDURE — 99490 PR CHRONIC CARE MGMT, 1ST 20 MIN: ICD-10-PCS | Mod: S$PBB,,, | Performed by: INTERNAL MEDICINE

## 2023-09-30 PROCEDURE — 99439 CHRNC CARE MGMT STAF EA ADDL: CPT | Mod: PBBFAC,27 | Performed by: INTERNAL MEDICINE

## 2023-09-30 PROCEDURE — 99439 PR CHRONIC CARE MGMT, EA ADDTL 20 MIN: ICD-10-PCS | Mod: S$PBB,,, | Performed by: INTERNAL MEDICINE

## 2023-10-04 ENCOUNTER — CLINICAL SUPPORT (OUTPATIENT)
Dept: CARDIOLOGY | Facility: HOSPITAL | Age: 58
End: 2023-10-04
Payer: MEDICARE

## 2023-10-04 DIAGNOSIS — Z95.810 PRESENCE OF AUTOMATIC (IMPLANTABLE) CARDIAC DEFIBRILLATOR: ICD-10-CM

## 2023-10-04 DIAGNOSIS — I48.91 UNSPECIFIED ATRIAL FIBRILLATION: ICD-10-CM

## 2023-10-04 DIAGNOSIS — I44.2 ATRIOVENTRICULAR BLOCK, COMPLETE: ICD-10-CM

## 2023-10-04 DIAGNOSIS — I42.8 OTHER CARDIOMYOPATHIES: ICD-10-CM

## 2023-10-04 PROCEDURE — 93295 CARDIAC DEVICE CHECK - REMOTE: ICD-10-PCS | Mod: ,,, | Performed by: INTERNAL MEDICINE

## 2023-10-04 PROCEDURE — 93295 DEV INTERROG REMOTE 1/2/MLT: CPT | Mod: ,,, | Performed by: INTERNAL MEDICINE

## 2023-10-04 NOTE — TELEPHONE ENCOUNTER
Call to patient to clarify Ajovy status. She states that she wants to change back to the Ajovy prefilled syringe from the autoinjector. No prescription for this active, will message provider to send new Rx for syringe. No other questions or concerns.     Ameya Pedraza, PharmD  Clinical Pharmacist  [facility]  P: [phone]     BACK PAIN/STIFFNESS/TINGLING

## 2023-10-09 ENCOUNTER — LAB VISIT (OUTPATIENT)
Dept: LAB | Facility: OTHER | Age: 58
End: 2023-10-09
Payer: MEDICARE

## 2023-10-09 DIAGNOSIS — Z95.810 BIVENTRICULAR AUTOMATIC IMPLANTABLE CARDIOVERTER DEFIBRILLATOR IN SITU: ICD-10-CM

## 2023-10-09 DIAGNOSIS — G47.33 OBSTRUCTIVE SLEEP APNEA: ICD-10-CM

## 2023-10-09 DIAGNOSIS — R53.83 OTHER FATIGUE: ICD-10-CM

## 2023-10-09 PROBLEM — I70.0 AORTIC ATHEROSCLEROSIS: Status: ACTIVE | Noted: 2023-10-09

## 2023-10-09 LAB
BASOPHILS # BLD AUTO: 0.01 K/UL (ref 0–0.2)
BASOPHILS NFR BLD: 0.3 % (ref 0–1.9)
DIFFERENTIAL METHOD: ABNORMAL
EOSINOPHIL # BLD AUTO: 0 K/UL (ref 0–0.5)
EOSINOPHIL NFR BLD: 0 % (ref 0–8)
ERYTHROCYTE [DISTWIDTH] IN BLOOD BY AUTOMATED COUNT: 16.8 % (ref 11.5–14.5)
HCT VFR BLD AUTO: 32.1 % (ref 37–48.5)
HGB BLD-MCNC: 10.5 G/DL (ref 12–16)
IMM GRANULOCYTES # BLD AUTO: 0.01 K/UL (ref 0–0.04)
IMM GRANULOCYTES NFR BLD AUTO: 0.3 % (ref 0–0.5)
LYMPHOCYTES # BLD AUTO: 1.2 K/UL (ref 1–4.8)
LYMPHOCYTES NFR BLD: 40.8 % (ref 18–48)
MCH RBC QN AUTO: 29.9 PG (ref 27–31)
MCHC RBC AUTO-ENTMCNC: 32.7 G/DL (ref 32–36)
MCV RBC AUTO: 92 FL (ref 82–98)
MONOCYTES # BLD AUTO: 0.4 K/UL (ref 0.3–1)
MONOCYTES NFR BLD: 11.5 % (ref 4–15)
NEUTROPHILS # BLD AUTO: 1.4 K/UL (ref 1.8–7.7)
NEUTROPHILS NFR BLD: 47.1 % (ref 38–73)
NRBC BLD-RTO: 0 /100 WBC
PLATELET # BLD AUTO: 207 K/UL (ref 150–450)
PMV BLD AUTO: 11.4 FL (ref 9.2–12.9)
RBC # BLD AUTO: 3.51 M/UL (ref 4–5.4)
TSH SERPL DL<=0.005 MIU/L-ACNC: 0.69 UIU/ML (ref 0.4–4)
WBC # BLD AUTO: 3.04 K/UL (ref 3.9–12.7)

## 2023-10-09 PROCEDURE — 85025 COMPLETE CBC W/AUTO DIFF WBC: CPT | Performed by: NURSE PRACTITIONER

## 2023-10-09 PROCEDURE — 84443 ASSAY THYROID STIM HORMONE: CPT | Performed by: NURSE PRACTITIONER

## 2023-10-09 PROCEDURE — 36415 COLL VENOUS BLD VENIPUNCTURE: CPT | Performed by: NURSE PRACTITIONER
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
## 2023-10-10 ENCOUNTER — HOSPITAL ENCOUNTER (OUTPATIENT)
Dept: CARDIOLOGY | Facility: CLINIC | Age: 58
Discharge: HOME | End: 2023-10-10
Payer: MEDICARE

## 2023-10-10 ENCOUNTER — E-CONSULT (OUTPATIENT)
Dept: HEMATOLOGY/ONCOLOGY | Facility: CLINIC | Age: 58
End: 2023-10-10
Payer: MEDICARE

## 2023-10-10 ENCOUNTER — OFFICE VISIT (OUTPATIENT)
Dept: ELECTROPHYSIOLOGY | Facility: CLINIC | Age: 58
End: 2023-10-10
Payer: MEDICARE

## 2023-10-10 ENCOUNTER — CLINICAL SUPPORT (OUTPATIENT)
Dept: CARDIOLOGY | Facility: HOSPITAL | Age: 58
End: 2023-10-10
Attending: INTERNAL MEDICINE
Payer: MEDICARE

## 2023-10-10 VITALS — BODY MASS INDEX: 50.02 KG/M2 | HEART RATE: 69 BPM | HEIGHT: 64 IN | WEIGHT: 293 LBS

## 2023-10-10 DIAGNOSIS — I42.8 NONISCHEMIC CARDIOMYOPATHY: ICD-10-CM

## 2023-10-10 DIAGNOSIS — I10 ESSENTIAL HYPERTENSION: ICD-10-CM

## 2023-10-10 DIAGNOSIS — I70.0 AORTIC ATHEROSCLEROSIS: ICD-10-CM

## 2023-10-10 DIAGNOSIS — Z86.74 HISTORY OF SUDDEN CARDIAC ARREST: ICD-10-CM

## 2023-10-10 DIAGNOSIS — I49.8 OTHER SPECIFIED CARDIAC ARRHYTHMIAS: ICD-10-CM

## 2023-10-10 DIAGNOSIS — D64.9 CHRONIC ANEMIA: ICD-10-CM

## 2023-10-10 DIAGNOSIS — D64.9 ANEMIA, UNSPECIFIED TYPE: Primary | ICD-10-CM

## 2023-10-10 DIAGNOSIS — Z86.73 HISTORY OF CVA (CEREBROVASCULAR ACCIDENT): ICD-10-CM

## 2023-10-10 DIAGNOSIS — Z79.01 LONG TERM (CURRENT) USE OF ANTICOAGULANTS: ICD-10-CM

## 2023-10-10 DIAGNOSIS — I48.0 PAROXYSMAL ATRIAL FIBRILLATION: ICD-10-CM

## 2023-10-10 DIAGNOSIS — I44.2 COMPLETE AV BLOCK: ICD-10-CM

## 2023-10-10 DIAGNOSIS — Z95.810 BIVENTRICULAR AUTOMATIC IMPLANTABLE CARDIOVERTER DEFIBRILLATOR IN SITU: Primary | ICD-10-CM

## 2023-10-10 PROCEDURE — 99451 NTRPROF PH1/NTRNET/EHR 5/>: CPT | Mod: ,,, | Performed by: INTERNAL MEDICINE

## 2023-10-10 PROCEDURE — 93010 RHYTHM STRIP: ICD-10-PCS | Mod: S$PBB,,, | Performed by: INTERNAL MEDICINE

## 2023-10-10 PROCEDURE — 99214 PR OFFICE/OUTPT VISIT, EST, LEVL IV, 30-39 MIN: ICD-10-PCS | Mod: S$PBB,,, | Performed by: NURSE PRACTITIONER

## 2023-10-10 PROCEDURE — 99214 OFFICE O/P EST MOD 30 MIN: CPT | Mod: PBBFAC | Performed by: NURSE PRACTITIONER

## 2023-10-10 PROCEDURE — 93005 ELECTROCARDIOGRAM TRACING: CPT | Mod: 59,PBBFAC | Performed by: INTERNAL MEDICINE

## 2023-10-10 PROCEDURE — 99451 PR INTERPROF, PHONE/INTERNET/EHR, CONSULT, >= 5MINS: ICD-10-PCS | Mod: ,,, | Performed by: INTERNAL MEDICINE

## 2023-10-10 PROCEDURE — 93284 PRGRMG EVAL IMPLANTABLE DFB: CPT

## 2023-10-10 PROCEDURE — 99999 PR PBB SHADOW E&M-EST. PATIENT-LVL IV: CPT | Mod: PBBFAC,,, | Performed by: NURSE PRACTITIONER

## 2023-10-10 PROCEDURE — 93284 CARDIAC DEVICE CHECK - IN CLINIC & HOSPITAL: ICD-10-PCS | Mod: 26,,, | Performed by: INTERNAL MEDICINE

## 2023-10-10 PROCEDURE — 93284 PRGRMG EVAL IMPLANTABLE DFB: CPT | Mod: 26,,, | Performed by: INTERNAL MEDICINE

## 2023-10-10 PROCEDURE — 93010 ELECTROCARDIOGRAM REPORT: CPT | Mod: S$PBB,,, | Performed by: INTERNAL MEDICINE

## 2023-10-10 PROCEDURE — 99214 OFFICE O/P EST MOD 30 MIN: CPT | Mod: S$PBB,,, | Performed by: NURSE PRACTITIONER

## 2023-10-10 PROCEDURE — 99999 PR PBB SHADOW E&M-EST. PATIENT-LVL IV: ICD-10-PCS | Mod: PBBFAC,,, | Performed by: NURSE PRACTITIONER

## 2023-10-10 NOTE — CONSULTS
[facility] - Hem Onc 3rd Fl  Response for E-Consult     Patient Name: Amna Chawla  MRN: [id number]  Primary Care Provider: Tiffany Mina MD   Requesting Provider: Silvia Wang NP  E-Consult to [facility]  Consult performed by: Dipika Negron MD  Consult ordered by: Silvia Wang NP  Reason for consult: Anemia  Assessment/Recommendations: See Consult           Recommendation: She has normocytic anemia, Hb stable in the 10-11 g/dl range since 2019.She also has leukopenia and neutropenia which may or may not be related.   Repeat cbc with pathologist interpretation differential, Retic, LDH, iron panel, ferritin and B12.   Check copper and zinc levels. Check for chronic liver disease,HIV, viral hepatitis and  rule out splenomegaly if indicated.     B12 has been intermittently low, consider B12 supplementation if not on it already.       Contingency: Refer for a clinic visit for worsening cytopenias, Hb dropping to the less then 10 g/dl range,or abnormal smear in which case she may need a bone marrow biopsy.     Total time of Consultation: 10 minute    I did not speak to the requesting provider verbally about this.     *This eConsult is based on the clinical data available to me and is furnished without benefit of a physical examination. The eConsult will need to be interpreted in light of any clinical issues or changes in patient status not available to me at the time of filing this eConsults. Significant changes in patient condition or level of acuity should result in immediate formal consultation and reevaluation. Please alert me if you have further questions.    Thank you for this eConsult referral.     Dipika Negron MD  [facility] - Hem Onc 3rd Fl

## 2023-10-11 ENCOUNTER — OFFICE VISIT (OUTPATIENT)
Dept: PSYCHIATRY | Facility: CLINIC | Age: 58
End: 2023-10-11
Payer: MEDICARE

## 2023-10-11 ENCOUNTER — PATIENT MESSAGE (OUTPATIENT)
Dept: PSYCHIATRY | Facility: CLINIC | Age: 58
End: 2023-10-11

## 2023-10-11 DIAGNOSIS — F33.1 DEPRESSION, MAJOR, RECURRENT, MODERATE: Primary | ICD-10-CM

## 2023-10-11 PROCEDURE — 90832 PR PSYCHOTHERAPY W/PATIENT, 30 MIN: ICD-10-PCS | Mod: 95,,, | Performed by: SOCIAL WORKER

## 2023-10-11 PROCEDURE — 90832 PSYTX W PT 30 MINUTES: CPT | Mod: 95,,, | Performed by: SOCIAL WORKER

## 2023-10-12 NOTE — PROGRESS NOTES
Individual Psychotherapy (PhD/LCSW)    10/11/2023    Site:  [facility]         Therapeutic Intervention: Met with patient.  Outpatient - Insight oriented psychotherapy 30 min - [id number]    Chief complaint/reason for encounter: depression     Interval history and content of current session: The patient location is: home in [place]  The chief complaint leading to consultation is: depression  Visit type: audiovisual  Total time spent with patient: 40 minutes  Each patient to whom he or she provides medical services by telemedicine is:  (1) informed of the relationship between the physician and patient and the respective role of any other health care provider with respect to management of the patient; and (2) notified that he or she may decline to receive medical services by telemedicine and may withdraw from such care at any time.    Notes: Follow-up with pt.  I was supposed to meet with her and her pregnant teen daughter but the daughter refused to meet with us at the last minute.  Pt has gotten her involved with the [facility] program and they have ordered her to get counseling.  The daughter is refusing to do anything that pt asks her to do.  Pt says she is very talkative sometime and then will suddenly shut down and refuse to talk to her.  She is going to school and making decent grades.  Pt is still very upset about the whole situation and how this will affect all of their lives.    Treatment plan:  Target symptoms: depression  Why chosen therapy is appropriate versus another modality: relevant to diagnosis  Outcome monitoring methods: self-report, observation  Therapeutic intervention type: insight oriented psychotherapy, supportive psychotherapy    Risk parameters:  Patient reports no suicidal ideation  Patient reports no homicidal ideation  Patient reports no self-injurious behavior  Patient reports no violent behavior    Verbal deficits: None    Patient's response to intervention:  The patient's response to  intervention is motivated.    Progress toward goals and other mental status changes:  The patient's progress toward goals is fair .    Diagnosis:     ICD-10-CM ICD-9-CM   1. Depression, major, recurrent, moderate  F33.1 296.32       Plan:  individual psychotherapy, group psychotherapy and medication management by physician    Return to clinic: 1 month

## 2023-10-13 ENCOUNTER — OFFICE VISIT (OUTPATIENT)
Dept: OTOLARYNGOLOGY | Facility: CLINIC | Age: 58
End: 2023-10-13
Payer: MEDICARE

## 2023-10-13 DIAGNOSIS — H91.90 DECREASED HEARING, UNSPECIFIED LATERALITY: ICD-10-CM

## 2023-10-13 PROCEDURE — 99499 UNLISTED E&M SERVICE: CPT | Mod: 95,,, | Performed by: OTOLARYNGOLOGY

## 2023-10-13 PROCEDURE — 99499 NO LOS: ICD-10-PCS | Mod: 95,,, | Performed by: OTOLARYNGOLOGY

## 2023-10-18 ENCOUNTER — OFFICE VISIT (OUTPATIENT)
Dept: INTERNAL MEDICINE | Facility: CLINIC | Age: 58
End: 2023-10-18
Payer: MEDICARE

## 2023-10-18 ENCOUNTER — LAB VISIT (OUTPATIENT)
Dept: LAB | Facility: HOSPITAL | Age: 58
End: 2023-10-18
Attending: INTERNAL MEDICINE
Payer: MEDICARE

## 2023-10-18 VITALS
DIASTOLIC BLOOD PRESSURE: 80 MMHG | WEIGHT: 288.81 LBS | HEIGHT: 64 IN | SYSTOLIC BLOOD PRESSURE: 116 MMHG | BODY MASS INDEX: 49.31 KG/M2

## 2023-10-18 DIAGNOSIS — E78.2 MIXED HYPERLIPIDEMIA: ICD-10-CM

## 2023-10-18 DIAGNOSIS — R73.03 PREDIABETES: ICD-10-CM

## 2023-10-18 DIAGNOSIS — E55.9 VITAMIN D DEFICIENCY: ICD-10-CM

## 2023-10-18 DIAGNOSIS — D51.9 ANEMIA DUE TO VITAMIN B12 DEFICIENCY, UNSPECIFIED B12 DEFICIENCY TYPE: ICD-10-CM

## 2023-10-18 DIAGNOSIS — F33.1 DEPRESSION, MAJOR, RECURRENT, MODERATE: ICD-10-CM

## 2023-10-18 DIAGNOSIS — E04.1 THYROID NODULE: ICD-10-CM

## 2023-10-18 DIAGNOSIS — I48.0 PAROXYSMAL ATRIAL FIBRILLATION: ICD-10-CM

## 2023-10-18 DIAGNOSIS — G89.29 CHRONIC BILATERAL LOW BACK PAIN WITHOUT SCIATICA: ICD-10-CM

## 2023-10-18 DIAGNOSIS — I42.8 NONISCHEMIC CARDIOMYOPATHY: ICD-10-CM

## 2023-10-18 DIAGNOSIS — R10.11 RIGHT UPPER QUADRANT PAIN: ICD-10-CM

## 2023-10-18 DIAGNOSIS — R53.83 FATIGUE, UNSPECIFIED TYPE: ICD-10-CM

## 2023-10-18 DIAGNOSIS — F41.9 ANXIETY: ICD-10-CM

## 2023-10-18 DIAGNOSIS — I10 ESSENTIAL HYPERTENSION: Primary | ICD-10-CM

## 2023-10-18 DIAGNOSIS — E66.01 CLASS 3 SEVERE OBESITY DUE TO EXCESS CALORIES WITH SERIOUS COMORBIDITY AND BODY MASS INDEX (BMI) OF 45.0 TO 49.9 IN ADULT: ICD-10-CM

## 2023-10-18 DIAGNOSIS — Z86.73 HISTORY OF STROKE: ICD-10-CM

## 2023-10-18 DIAGNOSIS — M54.50 CHRONIC BILATERAL LOW BACK PAIN WITHOUT SCIATICA: ICD-10-CM

## 2023-10-18 LAB
25(OH)D3+25(OH)D2 SERPL-MCNC: 20 NG/ML (ref 30–96)
ESTIMATED AVG GLUCOSE: 134 MG/DL (ref 68–131)
HBA1C MFR BLD: 6.3 % (ref 4–5.6)
VIT B12 SERPL-MCNC: 325 PG/ML (ref 210–950)

## 2023-10-18 PROCEDURE — 99214 OFFICE O/P EST MOD 30 MIN: CPT | Mod: S$PBB,,, | Performed by: INTERNAL MEDICINE

## 2023-10-18 PROCEDURE — 99999 PR PBB SHADOW E&M-EST. PATIENT-LVL V: CPT | Mod: PBBFAC,,, | Performed by: INTERNAL MEDICINE

## 2023-10-18 PROCEDURE — 99215 OFFICE O/P EST HI 40 MIN: CPT | Mod: PBBFAC,25 | Performed by: INTERNAL MEDICINE

## 2023-10-18 PROCEDURE — 90677 PCV20 VACCINE IM: CPT | Mod: PBBFAC

## 2023-10-18 PROCEDURE — 82306 VITAMIN D 25 HYDROXY: CPT | Performed by: INTERNAL MEDICINE

## 2023-10-18 PROCEDURE — 99999PBSHW PNEUMOCOCCAL CONJUGATE VACCINE 20-VALENT: ICD-10-PCS | Mod: PBBFAC,,,

## 2023-10-18 PROCEDURE — 83036 HEMOGLOBIN GLYCOSYLATED A1C: CPT | Performed by: INTERNAL MEDICINE

## 2023-10-18 PROCEDURE — 99999 PR PBB SHADOW E&M-EST. PATIENT-LVL V: ICD-10-PCS | Mod: PBBFAC,,, | Performed by: INTERNAL MEDICINE

## 2023-10-18 PROCEDURE — 99999PBSHW PNEUMOCOCCAL CONJUGATE VACCINE 20-VALENT: Mod: PBBFAC,,,

## 2023-10-18 PROCEDURE — 84630 ASSAY OF ZINC: CPT | Performed by: INTERNAL MEDICINE

## 2023-10-18 PROCEDURE — 82607 VITAMIN B-12: CPT | Performed by: INTERNAL MEDICINE

## 2023-10-18 PROCEDURE — 99214 PR OFFICE/OUTPT VISIT, EST, LEVL IV, 30-39 MIN: ICD-10-PCS | Mod: S$PBB,,, | Performed by: INTERNAL MEDICINE

## 2023-10-18 PROCEDURE — 36415 COLL VENOUS BLD VENIPUNCTURE: CPT | Performed by: INTERNAL MEDICINE

## 2023-10-18 PROCEDURE — 82525 ASSAY OF COPPER: CPT | Performed by: INTERNAL MEDICINE

## 2023-10-18 RX ORDER — CARVEDILOL 25 MG/1
50 TABLET ORAL 2 TIMES DAILY WITH MEALS
Qty: 360 TABLET | Refills: 3 | Status: SHIPPED | OUTPATIENT
Start: 2023-10-18 | End: 2023-12-08 | Stop reason: SDUPTHER

## 2023-10-18 NOTE — PROGRESS NOTES
INTERNAL MEDICINE ESTABLISHED PATIENT VISIT NOTE    Subjective:     Chief Complaint: Follow-up  HTN, preDM     Patient ID: Amna Chawla is a 58 y.o. female with hx CVA and TIA c residual cognitive deficits (most recently c TIA 9/2018 per pt, has mild B weakness from several strokes in the past), carotid a disease, HTN, paroxysmal A fib c LAE, hx sudden cardiac arrest c VF and no ischemic heart disease and preserved EF (2013), intermittent 3rd deg AV block and symptomatic LVEF of 40% in setting of normal PET stress and chronic RV pacing s/p CRT-D, HLD, thyroid nodule s/p FNA 7/2018 c path c/w benign follicular nodule, depression with anxiety followed by psych, chronic complex h/a c occipital neuralgia followed by Neuro and on mult meds and has also had tx c botox, GERD c hiatal hernia, B carpal tunnel s/p release B, hx L middle ring finger trigger finger s/p release, SHIRA on APAP 6-20cm followed in sleep clinic, insomnia, prediabetes, last seen by me in April, here today for f/u HTN, preDM.    Still seeing Dr. Cortez for mgmt of h/a and Dr. Bonilla and Dr. Borden for chronic back pain isuses.    Had f/u c Cardiology in May at which time advised to cont meds and f/u in a year.    Depression and anxiety followed by Angus Jacobs increased in June but states it upset her stomach so plans to discuss that c him.  Also on Abilify and prn Ambien and prn Klonopin.    Had been under increased stress due to her daughter having an amniotic fluid embolism requiring a prolonged ICU stay with complications but states that her daughter is now at home and she is helping her take care of the baby.    Pt was seen by [facility] who rec cont Xarelto for stroke prevention but told her she could stop the Plavix.    Had ED visit last month for R sided low back pain/flank pain.  Had CTA done which showed no kidney stones, overall unrevealing so was discharged on Robaxin for suspected msk etiology.    Had E-visit c me the following  week for back pain and cloudy urine and dx'ed c UTI, tx'ed c meds c improvement of sx.    Was recently seen by EP clinic who referred her to hematology clinic for anemia and fatigue (had neg GI w/u c EGD and csc in the past).  E consult c hematology done who rec B12 supplementation for intermittently low levels.  States that she is taking 2 tablets sublingually daily but unsure of her exact dose.  Had received injections in the past from Neurology but has not gotten this in a long time.    Today with complaint of intermittent right upper quadrant pain.  States that it feels tender to palpation along her ribs.  Denies recent trauma or falls.  Symptoms not related to meals.    Past Medical History:  Past Medical History:   Diagnosis Date    Anticoagulant long-term use     Anxiety     Asthma     Atrial fibrillation     Brain anoxic injury     Cervicalgia 8/28/2014    CHI (closed head injury) 2/19/2013    Convulsion 5/30/2015    Decreased ROM of left shoulder 4/12/2017    Defibrillator activation 2013    Depression     Heart block     History of sudden cardiac arrest 2/2013    PEA arrest with subsequent long-QT    Hx of psychiatric care     Hypertension     Left atrial enlargement 4/11/2018    Pacemaker 2013    Paresthesia 11/1/2013    Prolonged Q-T interval on ECG 2/8/2013    Psychiatric problem     Seizures     Sleep difficulties     Stroke     weakness lt side    Therapy     Thyroid disease     Upper airway resistance syndrome 2/21/2017       Home Medications:  Prior to Admission medications    Medication Sig Start Date End Date Taking? Authorizing Provider   amLODIPine (NORVASC) 5 MG tablet Take 1 tablet (5 mg total) by mouth once daily. 4/19/23 4/18/24 Yes Tiffany Mina MD   ARIPiprazole (ABILIFY) 15 MG Tab Take 1 tablet (15 mg total) by mouth once daily. 6/15/23  Yes Kade Huffman III, NP   blood sugar diagnostic Strp 1 strip by Misc.(Non-Drug; Combo Route) route 2 (two) times daily. 5/20/19  Yes Tiffany Mina MD    blood-glucose meter kit Please provide with insurance covered meter 5/20/19  Yes Tiffany Mina MD   carvediloL (COREG) 25 MG tablet Take 1.5 tablets (37.5 mg total) by mouth 2 (two) times daily with meals.  Patient taking differently: Take 37.5 mg by mouth 2 (two) times daily with meals. Patient taking 1 tablet BID 3/21/23  Yes Silvia Wang NP   cetirizine (ZYRTEC) 10 MG tablet Take 1 tablet (10 mg total) by mouth once daily. for 7 days 7/13/22 10/18/23 Yes Laina Melvin FNP   clonazePAM (KLONOPIN) 1 MG tablet TAKE 1 TABLET BY MOUTH DAILY AS NEEDED FOR ANXIETY 6/15/23  Yes Kade Huffman III, NP   cyanocobalamin, vitamin B-12, 1,000 mcg Subl Place 1,000 mcg under the tongue once daily. 9/20/23  Yes Dasha Osorio PA-C   diclofenac sodium (VOLTAREN) 1 % Gel Apply 2 g topically 4 (four) times daily. 9/20/23  Yes Dasha Osorio PA-C   donepezil (ARICEPT) 10 MG tablet Take 1 tablet (10 mg total) by mouth 2 (two) times daily. 7/18/17  Yes Toby Paredes MD   etodolac (LODINE) 500 MG tablet Take 1 tablet (500 mg total) by mouth 2 (two) times daily as needed (migraine). 1/21/21  Yes David Cortez MD   flurbiprofen (ANSAID) 100 MG tablet Take 1 tablet (100 mg total) by mouth 2 (two) times daily as needed (migraine). 2/24/23  Yes David Cortez MD   flurbiprofen (ANSAID) 100 MG tablet Take 1 tablet (100 mg total) by mouth 2 (two) times daily as needed (migraine). 2/24/23  Yes David Cortez MD   fremanezumab-vfrm (AJOVY) 225 mg/1.5 mL injection Inject 1.5 mLs (225 mg total) into the skin every 28 days. 2/17/22  Yes David Cortez MD   furosemide (LASIX) 20 MG tablet Take 1 tablet (20 mg total) by mouth every other day. 5/10/23 5/9/24 Yes Tiffany Mina MD   gabapentin (NEURONTIN) 300 MG capsule Take 1 capsule (300 mg total) by mouth 3 (three) times daily. 2/2/23  Yes Hugo Borden MD   hydrOXYzine HCL (ATARAX) 25 MG tablet Take 1 tablet (25 mg total) by mouth 3 (three) times  daily as needed for Itching. 7/13/22  Yes Laina Melvin FNP   lancets Misc 1 Device by Misc.(Non-Drug; Combo Route) route 2 (two) times daily. 5/20/19  Yes Tiffany Mina MD   losartan (COZAAR) 100 MG tablet Take 1 tablet (100 mg total) by mouth once daily. 4/19/23 4/18/24 Yes Tiffany Mina MD   magnesium 200 mg Tab Take 200 mg by mouth once daily. 3/7/18  Yes David Cortez MD   omeprazole (PRILOSEC) 40 MG capsule Take 1 capsule (40 mg total) by mouth once daily. Take 30 minutes before breakfast 4/19/23 4/18/24 Yes Tiffany Mina MD   ondansetron (ZOFRAN-ODT) 4 MG TbDL Take 1 tablet (4 mg total) by mouth every 12 (twelve) hours as needed (nausea). 7/20/22  Yes David Cortez MD   polyethylene glycol (GLYCOLAX) 17 gram PwPk Take 17 g by mouth once daily. 7/6/23  Yes David Cortez MD   rivaroxaban (XARELTO) 20 mg Tab TAKE 1 TABLET BY MOUTH DAILY EVENING MEAL WITH DINNER 11/22/22  Yes Avelino Mejia MD   rosuvastatin (CRESTOR) 40 MG Tab TAKE 1 TABLET(40 MG) BY MOUTH EVERY EVENING 5/16/23  Yes Tiffany Mina MD   sertraline (ZOLOFT) 100 MG tablet Take 2 tablets (200 mg total) by mouth once daily. 6/15/23  Yes Kade Huffman III, NP   suvorexant (BELSOMRA) 10 mg Tab Take 1 tablet by mouth nightly as needed (insomnia). 7/6/23  Yes David Cortez MD   tiaGABine (GABITRIL) 4 MG tablet Take 1 tablet (4 mg total) by mouth every evening. 8/3/20  Yes David Cortez MD   TRUE METRIX GLUCOSE METER Misc TEST BG BID 5/30/19  Yes Provider, Historical   ubrogepant (UBROGEPANT) 100 mg tablet Take 1 tablet (100 mg total) by mouth 2 (two) times daily as needed for Migraine. 6/22/21  Yes David Cortez MD   acetaminophen (TYLENOL) 500 MG tablet Take 1-2 tablets (500-1,000 mg total) by mouth 3 (three) times daily as needed for Pain.  Patient not taking: Reported on 10/18/2023 2/2/23   Hugo Borden MD   EPINEPHrine (EPIPEN) 0.3 mg/0.3 mL AtIn Inject 0.3 mLs (0.3 mg total) into the muscle once. for 1 dose 12/2/21  10/10/23  Yahaira Santos PA-C   famotidine (PEPCID) 20 MG tablet Take 1 tablet (20 mg total) by mouth 2 (two) times daily. for 7 days  Patient not taking: Reported on 10/10/2023 7/13/22 10/10/23  Laina Melvin, INOCENCIA   silver sulfADIAZINE 1% (SILVADENE) 1 % cream Apply topically 2 (two) times daily.  Patient not taking: Reported on 10/18/2023 1/4/22   Tiffany Mina MD   triamcinolone acetonide 0.1% (KENALOG) 0.1 % ointment Apply topically 2 (two) times daily.  Patient not taking: Reported on 10/18/2023 10/19/22   Tiffany Mina MD   zolpidem (AMBIEN) 10 mg Tab Take 1 tablet (10 mg total) by mouth nightly as needed (insomnia).  Patient not taking: Reported on 10/10/2023 6/15/23   Kade Huffman III, NP   metFORMIN (GLUCOPHAGE) 500 MG tablet Take 1 tablet (500 mg total) by mouth 2 (two) times daily with meals. 4/19/22 6/2/22  Tiffany Mina MD       Allergies:  Review of patient's allergies indicates:   Allergen Reactions    Aspirin Hives    Imitrex [sumatriptan] Palpitations    Penicillins Hives and Swelling    Shellfish containing products Anaphylaxis     seafood    Reglan [metoclopramide hcl] Other (See Comments)     Parkinsonism        Social History:  Social History     Tobacco Use    Smoking status: Never     Passive exposure: Never    Smokeless tobacco: Never   Substance Use Topics    Alcohol use: Yes     Comment: wine, socially    Drug use: No        Review of Systems   Constitutional:  Negative for appetite change, chills, fatigue, fever and unexpected weight change.   HENT:  Negative for congestion, hearing loss and rhinorrhea.    Eyes:  Negative for pain and visual disturbance.   Respiratory:  Negative for cough, chest tightness, shortness of breath and wheezing.    Cardiovascular:  Negative for chest pain, palpitations and leg swelling.   Gastrointestinal:  Negative for abdominal distention and abdominal pain.   Endocrine: Negative for polydipsia and polyuria.   Genitourinary:  Negative for  "decreased urine volume, difficulty urinating, dysuria, hematuria and vaginal discharge.   Musculoskeletal:  Positive for back pain.   Neurological:  Negative for weakness, numbness and headaches.   Psychiatric/Behavioral:  Negative for behavioral problems and confusion.          Health Maintenance:     Immunizations:   Influenza 9/2023  TDap 10/2/2018  Pneumovax 9/2018 here per pt, Prevnar 20 today due to comorbidities.  Shingrix 10/2019, 3/2020 completed.  Moderna COVID 2/2021, 3/2021, 1/2022, updated booster rec this Fall, declined today as she states she had side effects with previous COVID boosters and is too busy at this time     Cancer Screening:  PAP: 9/2019 c Dr. Marroquin wnl, rec f/u  Mammogram:  6/2023 benign  Colonoscopy:  5/2019, polyps x2 removed, tubular adenoma dn tubulovillous adenoma on path c rec repeat in 3 yrs per report.    Repeat done 6/2022 c polyp x1, path c/w hyperplastic polyp, rec f/u in 5 yrs.    Objective:   /80 (BP Location: Right arm, Patient Position: Sitting, BP Method: Large (Manual))   Ht 5' 4" (1.626 m)   Wt 131 kg (288 lb 12.8 oz)   LMP 01/03/2016   BMI 49.57 kg/m²        General: AAO x3, no apparent distress  CV: RRR, no m/r/g  Pulm: Lungs CTAB, no crackles, no wheezes  Abd:  Soft, mild tenderness to palpation along the ribs in the right upper quadrant, no rebound no guarding  Extremities: no c/c/e    Labs:     Lab Results   Component Value Date    WBC 3.04 (L) 10/09/2023    HGB 10.5 (L) 10/09/2023    HCT 32.1 (L) 10/09/2023    MCV 92 10/09/2023     10/09/2023     Lab Results   Component Value Date    IRON 80 01/18/2019    TRANSFERRIN 228 01/18/2019    TIBC 337 01/18/2019    FESATURATED 24 01/18/2019      Lab Results   Component Value Date    FERRITIN 59 01/18/2019     Lab Results   Component Value Date    KHSROTNN38 309 05/05/2023     Lab Results   Component Value Date    FOLATE 12.3 01/28/2019       Sodium   Date Value Ref Range Status   09/22/2023 137 136 - " 145 mmol/L Final     Potassium   Date Value Ref Range Status   09/22/2023 3.8 3.5 - 5.1 mmol/L Final     Chloride   Date Value Ref Range Status   09/22/2023 107 95 - 110 mmol/L Final     CO2   Date Value Ref Range Status   09/22/2023 25 23 - 29 mmol/L Final     Glucose   Date Value Ref Range Status   09/22/2023 121 (H) 70 - 110 mg/dL Final     BUN   Date Value Ref Range Status   09/22/2023 8 6 - 20 mg/dL Final     Creatinine   Date Value Ref Range Status   09/22/2023 0.9 0.5 - 1.4 mg/dL Final     Calcium   Date Value Ref Range Status   09/22/2023 8.7 8.7 - 10.5 mg/dL Final     Total Protein   Date Value Ref Range Status   09/22/2023 9.7 (H) 6.0 - 8.4 g/dL Final     Albumin   Date Value Ref Range Status   09/22/2023 2.9 (L) 3.5 - 5.2 g/dL Final     Total Bilirubin   Date Value Ref Range Status   09/22/2023 0.3 0.1 - 1.0 mg/dL Final     Comment:     For infants and newborns, interpretation of results should be based  on gestational age, weight and in agreement with clinical  observations.    Premature Infant recommended reference ranges:  Up to 24 hours.............<8.0 mg/dL  Up to 48 hours............<12.0 mg/dL  3-5 days..................<15.0 mg/dL  6-29 days.................<15.0 mg/dL       Alkaline Phosphatase   Date Value Ref Range Status   09/22/2023 58 55 - 135 U/L Final     AST   Date Value Ref Range Status   09/22/2023 17 10 - 40 U/L Final     ALT   Date Value Ref Range Status   09/22/2023 16 10 - 44 U/L Final     Anion Gap   Date Value Ref Range Status   09/22/2023 5 (L) 8 - 16 mmol/L Final     eGFR if    Date Value Ref Range Status   05/17/2022 >60.0 >60 mL/min/1.73 m^2 Final     eGFR if non    Date Value Ref Range Status   05/17/2022 >60.0 >60 mL/min/1.73 m^2 Final     Comment:     Calculation used to obtain the estimated glomerular filtration  rate (eGFR) is the CKD-EPI equation.        Lab Results   Component Value Date    HGBA1C 6.2 (H) 05/05/2023     Lab Results    Component Value Date    LDLCALC 52.6 (L) 05/05/2023     Lab Results   Component Value Date    TSH 0.695 10/09/2023         Assessment/Plan     Amna was seen today for follow-up.    Diagnoses and all orders for this visit:    Essential hypertension  At goal, we will continue current regimen.  States she has been taking 2 tablets b.i.d. of carvedilol not 1-1/2 tablets b.i.d., we will update prescription list   -     carvediloL (COREG) 25 MG tablet; Take 2 tablets (50 mg total) by mouth 2 (two) times daily with meals.    Mixed hyperlipidemia  Lab Results   Component Value Date    LDLCALC 52.6 (L) 05/05/2023     At goal, continue current medications, can repeat labs at next visit    Paroxysmal atrial fibrillation  Stable, no acute issues, continue routine follow-up with EP Clinic   -     carvediloL (COREG) 25 MG tablet; Take 2 tablets (50 mg total) by mouth 2 (two) times daily with meals.    Anemia due to vitamin B12 deficiency, unspecified B12 deficiency type  Appears consistent with anemia of chronic disease with small component of B12 deficiency  We will order additional labs as recommended by Hematology   We will repeat B12 level since she has not had recent labs and adjust her supplement dose if needed   Suspect her fatigue is likely multifactorial as she is been under increased stress  -     Vitamin B12; Future    Class 3 severe obesity due to excess calories with serious comorbidity and body mass index (BMI) of 45.0 to 49.9 in adult  Weight down overall compared to last visit continue dietary modifications   Exercise as tolerated.      History of stroke  No acute issues, continue blood pressure and lipid control as well as Xarelto.    No longer on Xarelto, medication stopped by vascular Neurology    Prediabetes  Lab Results   Component Value Date    HGBA1C 6.2 (H) 05/05/2023     Worse on last check, needs updated labs  Pt was counseled on the need to avoid intake of simple sugars and minimize carbohydrates.   Also recommended weight loss and daily exercise.  -     Hemoglobin A1C; Future    Thyroid nodule  Has not had recent u/s, will repeat  -     US Soft Tissue Head Neck Thyroid; Future    Chronic bilateral low back pain without sciatica  As per HPI  Will refer back to spine clinic for recurrent pain  -     Ambulatory referral/consult to Back & Spine Clinic; Future    Right upper quadrant pain  Suspect costochondritis  Rec tylenol prn  Avoiding nsaids due to cardiac hx  -     US Abdomen Limited; Future    Fatigue, unspecified type  Will order additional labs for anemia w/u per h/o recs  -     COPPER, SERUM; Future  -     ZINC; Future    Vitamin D deficiency  Needs updated labs, could be contributing to fatigue  -     Vitamin D; Future    Nonischemic cardiomyopathy from RV pacing  -     carvediloL (COREG) 25 MG tablet; Take 2 tablets (50 mg total) by mouth 2 (two) times daily with meals.    Anxiety  Depression, major, recurrent, moderate  As per HPI.  Recommend follow-up with Psychiatry as she states Zoloft causes GI upset      HM as above  RTC in 6 months, sooner if needed.  Labs today  Tiffany Mina MD  Department of Internal Medicine - [facility]  10/18/2023

## 2023-10-19 DIAGNOSIS — G47.8 UPPER AIRWAY RESISTANCE SYNDROME: ICD-10-CM

## 2023-10-19 DIAGNOSIS — G43.711 CHRONIC MIGRAINE WITHOUT AURA, WITH INTRACTABLE MIGRAINE, SO STATED, WITH STATUS MIGRAINOSUS: ICD-10-CM

## 2023-10-23 LAB — ZINC SERPL-MCNC: 61 UG/DL (ref 60–130)

## 2023-10-24 LAB — COPPER SERPL-MCNC: 1115 UG/L (ref 810–1990)

## 2023-10-24 RX ORDER — ONDANSETRON 4 MG/1
TABLET, ORALLY DISINTEGRATING ORAL
Qty: 40 TABLET | Refills: 12 | Status: SHIPPED | OUTPATIENT
Start: 2023-10-24 | End: 2023-12-08 | Stop reason: SDUPTHER

## 2023-10-31 ENCOUNTER — EXTERNAL CHRONIC CARE MANAGEMENT (OUTPATIENT)
Dept: PRIMARY CARE CLINIC | Facility: CLINIC | Age: 58
End: 2023-10-31
Payer: MEDICARE

## 2023-10-31 PROCEDURE — 99490 CHRNC CARE MGMT STAFF 1ST 20: CPT | Mod: S$PBB,,, | Performed by: INTERNAL MEDICINE

## 2023-10-31 PROCEDURE — 99490 CHRNC CARE MGMT STAFF 1ST 20: CPT | Mod: PBBFAC | Performed by: INTERNAL MEDICINE

## 2023-10-31 PROCEDURE — 99490 PR CHRONIC CARE MGMT, 1ST 20 MIN: ICD-10-PCS | Mod: S$PBB,,, | Performed by: INTERNAL MEDICINE

## 2023-11-03 ENCOUNTER — TELEPHONE (OUTPATIENT)
Dept: NEUROLOGY | Facility: CLINIC | Age: 58
End: 2023-11-03
Payer: MEDICARE

## 2023-11-03 NOTE — TELEPHONE ENCOUNTER
----- Message from Debo London sent at 11/3/2023 10:32 AM CDT -----  Regarding: Botox  Contact: Pt [phone]  Pt is calling to speak to nurse yo schedule Botox injections please call

## 2023-11-06 RX ORDER — RIVAROXABAN 20 MG/1
TABLET, FILM COATED ORAL
Qty: 30 TABLET | Refills: 11 | Status: SHIPPED | OUTPATIENT
Start: 2023-11-06

## 2023-11-08 ENCOUNTER — PATIENT MESSAGE (OUTPATIENT)
Dept: NEUROLOGY | Facility: CLINIC | Age: 58
End: 2023-11-08
Payer: MEDICARE

## 2023-11-09 ENCOUNTER — HOSPITAL ENCOUNTER (OUTPATIENT)
Dept: RADIOLOGY | Facility: HOSPITAL | Age: 58
Discharge: HOME | End: 2023-11-09
Attending: NURSE PRACTITIONER
Payer: MEDICARE

## 2023-11-09 ENCOUNTER — OFFICE VISIT (OUTPATIENT)
Dept: ORTHOPEDICS | Facility: CLINIC | Age: 58
End: 2023-11-09
Payer: MEDICARE

## 2023-11-09 DIAGNOSIS — M79.671 RIGHT FOOT PAIN: Primary | ICD-10-CM

## 2023-11-09 DIAGNOSIS — M79.671 RIGHT FOOT PAIN: ICD-10-CM

## 2023-11-09 PROCEDURE — 73630 X-RAY EXAM OF FOOT: CPT | Mod: 26,RT,, | Performed by: RADIOLOGY

## 2023-11-09 PROCEDURE — 99213 PR OFFICE/OUTPT VISIT, EST, LEVL III, 20-29 MIN: ICD-10-PCS | Mod: S$PBB,,, | Performed by: NURSE PRACTITIONER

## 2023-11-09 PROCEDURE — 99213 OFFICE O/P EST LOW 20 MIN: CPT | Mod: S$PBB,,, | Performed by: NURSE PRACTITIONER

## 2023-11-09 PROCEDURE — 99999 PR PBB SHADOW E&M-EST. PATIENT-LVL IV: CPT | Mod: PBBFAC,,, | Performed by: NURSE PRACTITIONER

## 2023-11-09 PROCEDURE — 73630 X-RAY EXAM OF FOOT: CPT | Mod: TC,RT

## 2023-11-09 PROCEDURE — 99999 PR PBB SHADOW E&M-EST. PATIENT-LVL IV: ICD-10-PCS | Mod: PBBFAC,,, | Performed by: NURSE PRACTITIONER

## 2023-11-09 PROCEDURE — 73630 XR FOOT COMPLETE 3 VIEW RIGHT: ICD-10-PCS | Mod: 26,RT,, | Performed by: RADIOLOGY

## 2023-11-09 PROCEDURE — 99214 OFFICE O/P EST MOD 30 MIN: CPT | Mod: PBBFAC | Performed by: NURSE PRACTITIONER

## 2023-11-09 NOTE — PROGRESS NOTES
SUBJECTIVE:     Chief Complaint & History of Present Illness:  Amna Chawla is a Established 58 y.o. year old female patient, new to me, here for constant right foot/ankle pain which has been present for 2 weeks.  There is not a history of injury.  The pain is located in the lateral aspect of the foot/ankle.  The pain is described as achy, 5-6/10.  It is aggravated by walking.  There is not radiation, numbness or tingling into the toes.  Associated symptoms include discomfort. Previous treatments include avoidance of offending activity which have provided minimal relief.  There is not a history of previous injury or surgery to the foot.   The patient does not use an assistive device.    Review of patient's allergies indicates:   Allergen Reactions    Aspirin Hives    Imitrex [sumatriptan] Palpitations    Penicillins Hives and Swelling    Shellfish containing products Anaphylaxis     seafood    Reglan [metoclopramide hcl] Other (See Comments)     Parkinsonism          Current Outpatient Medications   Medication Sig Dispense Refill    amLODIPine (NORVASC) 5 MG tablet Take 1 tablet (5 mg total) by mouth once daily. 90 tablet 3    ARIPiprazole (ABILIFY) 15 MG Tab Take 1 tablet (15 mg total) by mouth once daily. 90 tablet 3    blood sugar diagnostic Strp 1 strip by Misc.(Non-Drug; Combo Route) route 2 (two) times daily. 200 strip 3    blood-glucose meter kit Please provide with insurance covered meter 1 each 0    carvediloL (COREG) 25 MG tablet Take 2 tablets (50 mg total) by mouth 2 (two) times daily with meals. 360 tablet 3    cetirizine (ZYRTEC) 10 MG tablet Take 1 tablet (10 mg total) by mouth once daily. for 7 days 30 tablet 0    clonazePAM (KLONOPIN) 1 MG tablet TAKE 1 TABLET BY MOUTH DAILY AS NEEDED FOR ANXIETY 30 tablet 5    cyanocobalamin, vitamin B-12, 1,000 mcg Subl Place 1,000 mcg under the tongue once daily. 90 tablet 3    diclofenac sodium (VOLTAREN) 1 % Gel Apply 2 g topically 4 (four) times daily.  100 g 1    donepezil (ARICEPT) 10 MG tablet Take 1 tablet (10 mg total) by mouth 2 (two) times daily. 180 tablet 3    EPINEPHrine (EPIPEN) 0.3 mg/0.3 mL AtIn Inject 0.3 mLs (0.3 mg total) into the muscle once. for 1 dose 0.3 mL 0    etodolac (LODINE) 500 MG tablet Take 1 tablet (500 mg total) by mouth 2 (two) times daily as needed (migraine). 30 tablet 12    flurbiprofen (ANSAID) 100 MG tablet Take 1 tablet (100 mg total) by mouth 2 (two) times daily as needed (migraine). 12 tablet 12    flurbiprofen (ANSAID) 100 MG tablet Take 1 tablet (100 mg total) by mouth 2 (two) times daily as needed (migraine). 12 tablet 12    fremanezumab-vfrm (AJOVY) 225 mg/1.5 mL injection Inject 1.5 mLs (225 mg total) into the skin every 28 days. 1 each 12    furosemide (LASIX) 20 MG tablet Take 1 tablet (20 mg total) by mouth every other day. 45 tablet 3    gabapentin (NEURONTIN) 300 MG capsule Take 1 capsule (300 mg total) by mouth 3 (three) times daily. 270 capsule 1    hydrOXYzine HCL (ATARAX) 25 MG tablet Take 1 tablet (25 mg total) by mouth 3 (three) times daily as needed for Itching. 20 tablet 0    lancets Misc 1 Device by Misc.(Non-Drug; Combo Route) route 2 (two) times daily. 200 each 3    losartan (COZAAR) 100 MG tablet Take 1 tablet (100 mg total) by mouth once daily. 90 tablet 3    magnesium 200 mg Tab Take 200 mg by mouth once daily. 30 each 12    omeprazole (PRILOSEC) 40 MG capsule Take 1 capsule (40 mg total) by mouth once daily. Take 30 minutes before breakfast 90 capsule 6    ondansetron (ZOFRAN-ODT) 4 MG TbDL DISSOLVE 1 TABLET(4 MG) ON THE TONGUE EVERY 12 HOURS AS NEEDED FOR NAUSEA 40 tablet 12    polyethylene glycol (GLYCOLAX) 17 gram PwPk Take 17 g by mouth once daily. 20 each 12    rosuvastatin (CRESTOR) 40 MG Tab TAKE 1 TABLET(40 MG) BY MOUTH EVERY EVENING 90 tablet 3    sertraline (ZOLOFT) 100 MG tablet Take 2 tablets (200 mg total) by mouth once daily. 180 tablet 3    suvorexant (BELSOMRA) 10 mg Tab Take 1 tablet  by mouth nightly as needed (insomnia). 30 tablet 5    tiaGABine (GABITRIL) 4 MG tablet Take 1 tablet (4 mg total) by mouth every evening. 30 tablet 12    TRUE METRIX GLUCOSE METER Misc TEST BG BID  0    ubrogepant (UBROGEPANT) 100 mg tablet Take 1 tablet (100 mg total) by mouth 2 (two) times daily as needed for Migraine. 10 tablet 12    XARELTO 20 mg Tab TAKE 1 TABLET BY MOUTH DAILY EVENING MEAL WITH DINNER 30 tablet 11     Current Facility-Administered Medications   Medication Dose Route Frequency Provider Last Rate Last Admin    onabotulinumtoxina injection 200 Units  200 Units Intramuscular Q90 Days David Cortez MD   200 Units at 10/19/18 1713    onabotulinumtoxina injection 200 Units  200 Units Intramuscular Q90 Days David Cortez MD   200 Units at 12/28/18 1106    onabotulinumtoxina injection 200 Units  200 Units Intramuscular Q90 Days David Cortez MD   200 Units at 03/13/19 1504    onabotulinumtoxina injection 200 Units  200 Units Intramuscular Q90 Days David Cortez MD   200 Units at 05/23/19 1123    onabotulinumtoxina injection 200 Units  200 Units Intramuscular Q90 Days David Cortez MD   200 Units at 10/11/19 1112    onabotulinumtoxina injection 200 Units  200 Units Intramuscular Q90 Days David Cortez MD   200 Units at 12/19/19 1036    onabotulinumtoxina injection 200 Units  200 Units Intramuscular Q10 weeks David Cortez MD   200 Units at 03/10/20 1036    onabotulinumtoxina injection 200 Units  200 Units Intramuscular Q90 Days David Cortez MD   200 Units at 07/06/23 1242    triamcinolone acetonide injection 40 mg  40 mg Intramuscular 1 time in Clinic/Laina Bhakta FNP         Facility-Administered Medications Ordered in Other Visits   Medication Dose Route Frequency Provider Last Rate Last Admin    lactated ringers infusion   Intravenous Continuous Humberto Angel MD   Stopped at 02/11/20 0801    lidocaine (PF) 10 mg/ml (1%) injection 5 mg  0.5 mL Intradermal Once  Humberto Angel MD           Past Medical History:   Diagnosis Date    Anticoagulant long-term use     Anxiety     Asthma     Atrial fibrillation     Brain anoxic injury     Cervicalgia 8/28/2014    [facility]I (closed head injury) 2/19/2013    Convulsion 5/30/2015    Decreased ROM of left shoulder 4/12/2017    Defibrillator activation 2013    Depression     Heart block     History of sudden cardiac arrest 2/2013    PEA arrest with subsequent long-QT    Hx of psychiatric care     Hypertension     Left atrial enlargement 4/11/2018    Pacemaker 2013    Paresthesia 11/1/2013    Prolonged Q-T interval on ECG 2/8/2013    Psychiatric problem     Seizures     Sleep difficulties     Stroke     weakness lt side    Therapy     Thyroid disease     Upper airway resistance syndrome 2/21/2017       Past Surgical History:   Procedure Laterality Date    breast reduction      CARDIAC DEFIBRILLATOR PLACEMENT      CARDIAC DEFIBRILLATOR PLACEMENT      CARPAL TUNNEL RELEASE Right     COLONOSCOPY N/A 5/7/2019    Procedure: COLONOSCOPY;  Surgeon: Juan Coffey MD;  Location: [facility] (2ND FLR);  Service: Endoscopy;  Laterality: N/A;  per DR. Bustamante Pt to have balloon with DR. Coffey due to excesive looping, could not reach cecum - see telephone encounter 3/8/19 and last procedure report dated 6/24/14/ Per Piedad schedule as 90 min case- ERW  pacemaker/AICD Boitronik/   per , ok to hold Xareltox 2 days    COLONOSCOPY N/A 6/2/2022    Procedure: COLONOSCOPY;  Surgeon: Juan Coffey MD;  Location: [facility] (2ND FLR);  Service: Endoscopy;  Laterality: N/A;  combined orders    ESOPHAGOGASTRODUODENOSCOPY N/A 6/2/2022    Procedure: EGD (ESOPHAGOGASTRODUODENOSCOPY);  Surgeon: Juan Coffey MD;  Location: [facility] (2ND FLR);  Service: Endoscopy;  Laterality: N/A;  BMI 54; pt requests 1st available [facility]  Fully vaccinated, Hold Xarelto x 2 days per Dr. Mejia and Plavix x 5 days per Dr. Grossman see telephone encounter 4/25/22, Biotronik PM/AICD,  prep instr portal and mailed -ml    HERNIA REPAIR      HIATAL HERNIA REPAIR      INSERT / REPLACE / REMOVE PACEMAKER  10/2017    CRT-D upgrade    REVISION OF IMPLANTABLE CARDIOVERTER-DEFIBRILLATOR (ICD) ELECTRODE LEAD PLACEMENT Left 12/7/2020    Procedure: REVISION, INSERTION, ELECTRODE LEAD, ICD;  Surgeon: Avelino Mejia MD;  Location: [facility] EP LAB;  Service: Cardiology;  Laterality: Left;    REVISION OF SKIN POCKET FOR CARDIOVERTER-DEFIBRILLATOR  12/7/2020    Procedure: Revision, Skin Pocket, For Cardioverter-Defibrillator;  Surgeon: Avelino Mejia MD;  Location: [facility] EP LAB;  Service: Cardiology;;    TOTAL REDUCTION MAMMOPLASTY      TRANSESOPHAGEAL ECHOCARDIOGRAPHY N/A 12/7/2020    Procedure: ECHOCARDIOGRAM, TRANSESOPHAGEAL;  Surgeon: Ivory Goodman MD;  Location: [facility] EP LAB;  Service: Cardiology;  Laterality: N/A;    TRANSESOPHAGEAL ECHOCARDIOGRAPHY N/A 12/7/2020    Procedure: ECHOCARDIOGRAM, TRANSESOPHAGEAL;  Surgeon: Patrick Diagnostic Provider;  Location: [facility] 2ND FLR;  Service: Cardiology;  Laterality: N/A;    TRIGGER FINGER RELEASE Left 8/2/2019    Procedure: RELEASE, TRIGGER FINGER left middle;  Surgeon: Matt Pugh Jr., MD;  Location: [facility];  Service: Plastics;  Laterality: Left;    TRIGGER FINGER RELEASE Right 2/11/2020    Procedure: RELEASE, TRIGGER FINGER middle;  Surgeon: Matt Pugh Jr., MD;  Location: [facility];  Service: Plastics;  Laterality: Right;    TUBAL LIGATION         Family History   Problem Relation Age of Onset    Hypertension Mother     Glaucoma Maternal Grandmother     Glaucoma Paternal Grandmother     COPD Other     Heart failure Other          Review of Systems:  ROS:  Constitutional: no fever or chills  Eyes: no visual changes  ENT: no nasal congestion or sore throat  Respiratory: no cough or shortness of breath  Cardiovascular: no chest pain or palpitations  Gastrointestinal: no nausea or vomiting, tolerating diet  Genitourinary: no hematuria or  "dysuria  Integument/Breast: no rash or pruritis  Hematologic/Lymphatic: no easy bruising or lymphadenopathy  Musculoskeletal: positive for arthralgias  Neurological: no seizures or tremors  Behavioral/Psych: no auditory or visual hallucinations  Endocrine: no heat or cold intolerance      OBJECTIVE:     PHYSICAL EXAM:  Vital Signs (Most Recent)  There were no vitals filed for this visit.     ,   Estimated body mass index is 49.57 kg/m² as calculated from the following:    Height as of 10/18/23: 5' 4" (1.626 m).    Weight as of 10/18/23: 131 kg (288 lb 12.8 oz).   General Appearance: Well nourished, well developed, in no acute distress.  HENT: Normal cephalic, oropharynx pink and moist  Eyes: PERRLA bilaterally and EOM x 4  Respiratory: Even and unlabored  Skin: Warm and Dry.   Psychiatric: AAO x 4, Mood & affect are normal.    right  Foot/Ankle    General appearance: no acute distress, alert/oriented x3, appropriate mood and affect, looks stated age, obese, and well nourished  The examination was performed out of splint/cast  Skin: normal  Swelling: none  Warmth: no warmth  Tenderness: diffuse  ROM: normal  Strength: normal  Gait: normal  Stability: stable to testing and Cotton test: negative  Crepitus: no  Neurological Exam: normal  Vascular Exam: normal and pulse present    normal exam, no swelling, tenderness, instability; ligaments intact, full range of motion of all ankle/foot joints      RADIOGRAPHS:  Right foot xray was obtained, findings show acute fractures.  All radiographs were personally reviewed by me.    ASSESSMENT/PLAN:       ICD-10-CM ICD-9-CM   1. Right foot pain  M79.671 729.5       Plan:  -Amna Chawla presents to clinic today with c/c right foot/ankle pain for the past 2 weeks, no trauma.  -X-ray as above.  -Recommend RICE therapy.    Etiology of patient's pain is benign.  Although x-rays appear to be normal she could have a possible stress fracture.  Therefore will place the patient in a " postop shoe and advised her if her pain persists or worsens over the next 2 weeks then we will need to do advanced imaging.  Patient does have a pacemaker and defibrillator implanted and we will need to make sure that this is MRI compatible if not we will get a CT.    Patient will follow up in 2 weeks p.r.n., sooner for new or worsening pain.  In the meantime patient can take Tylenol OTC p.r.n..  She needs to avoid NSAIDs due to being on Xarelto.  She can also try topical creams such as Voltaren gel and/or Aspercreme p.r.n..

## 2023-11-14 NOTE — PROGRESS NOTES
Subjective     Patient ID: Amna Chawla is a 58 y.o. female.    Chief Complaint: No chief complaint on file.    HPI  Review of Systems       Objective     Physical Exam      Data Reviewed:         Assessment and Plan     1. Decreased hearing, unspecified laterality  -     Ambulatory referral/consult to ENT             No follow-ups on file.

## 2023-11-15 ENCOUNTER — PATIENT MESSAGE (OUTPATIENT)
Dept: NEUROLOGY | Facility: CLINIC | Age: 58
End: 2023-11-15
Payer: MEDICARE

## 2023-11-16 DIAGNOSIS — G43.711 CHRONIC MIGRAINE WITHOUT AURA, WITH INTRACTABLE MIGRAINE, SO STATED, WITH STATUS MIGRAINOSUS: ICD-10-CM

## 2023-11-16 RX ORDER — ETODOLAC 500 MG/1
500 TABLET, FILM COATED ORAL 2 TIMES DAILY PRN
Qty: 30 TABLET | Refills: 12 | Status: SHIPPED | OUTPATIENT
Start: 2023-11-16

## 2023-11-30 ENCOUNTER — EXTERNAL CHRONIC CARE MANAGEMENT (OUTPATIENT)
Dept: PRIMARY CARE CLINIC | Facility: CLINIC | Age: 58
End: 2023-11-30
Payer: MEDICARE

## 2023-11-30 DIAGNOSIS — M62.838 MUSCLE SPASM: ICD-10-CM

## 2023-11-30 DIAGNOSIS — G43.711 CHRONIC MIGRAINE WITHOUT AURA, WITH INTRACTABLE MIGRAINE, SO STATED, WITH STATUS MIGRAINOSUS: ICD-10-CM

## 2023-11-30 PROCEDURE — 99490 PR CHRONIC CARE MGMT, 1ST 20 MIN: ICD-10-PCS | Mod: S$PBB,,, | Performed by: INTERNAL MEDICINE

## 2023-11-30 PROCEDURE — 99490 CHRNC CARE MGMT STAFF 1ST 20: CPT | Mod: S$PBB,,, | Performed by: INTERNAL MEDICINE

## 2023-11-30 PROCEDURE — 99490 CHRNC CARE MGMT STAFF 1ST 20: CPT | Mod: PBBFAC | Performed by: INTERNAL MEDICINE

## 2023-12-08 DIAGNOSIS — I42.8 NONISCHEMIC CARDIOMYOPATHY: ICD-10-CM

## 2023-12-08 DIAGNOSIS — G47.8 UPPER AIRWAY RESISTANCE SYNDROME: ICD-10-CM

## 2023-12-08 DIAGNOSIS — I10 ESSENTIAL HYPERTENSION: ICD-10-CM

## 2023-12-08 DIAGNOSIS — G43.711 CHRONIC MIGRAINE WITHOUT AURA, WITH INTRACTABLE MIGRAINE, SO STATED, WITH STATUS MIGRAINOSUS: ICD-10-CM

## 2023-12-08 DIAGNOSIS — K21.9 GASTROESOPHAGEAL REFLUX DISEASE WITHOUT ESOPHAGITIS: ICD-10-CM

## 2023-12-08 DIAGNOSIS — I48.0 PAROXYSMAL ATRIAL FIBRILLATION: ICD-10-CM

## 2023-12-08 RX ORDER — ONDANSETRON 4 MG/1
TABLET, ORALLY DISINTEGRATING ORAL
Qty: 40 TABLET | Refills: 12 | Status: SHIPPED | OUTPATIENT
Start: 2023-12-08

## 2023-12-08 RX ORDER — OMEPRAZOLE 40 MG/1
40 CAPSULE, DELAYED RELEASE ORAL DAILY
Qty: 90 CAPSULE | Refills: 6 | Status: SHIPPED | OUTPATIENT
Start: 2023-12-08 | End: 2024-12-07

## 2023-12-08 RX ORDER — CARVEDILOL 25 MG/1
50 TABLET ORAL 2 TIMES DAILY WITH MEALS
Qty: 360 TABLET | Refills: 3 | Status: SHIPPED | OUTPATIENT
Start: 2023-12-08

## 2023-12-08 NOTE — TELEPHONE ENCOUNTER
No care due was identified.  [facility] Embedded Care Due Messages. Reference number: [id number].   12/08/2023 9:31:05 AM CST

## 2023-12-08 NOTE — TELEPHONE ENCOUNTER
----- Message from Elodia Marroquin sent at 12/8/2023  8:35 AM CST -----  Contact: [phone]  Requesting an RX refill or new RX.  Is this a refill or new RX:   RX name and strength omeprazole (PRILOSEC) 40 MG capsule  Is this a 30 day or 90 day RX: 90   Pharmacy name and phone # [facility] - [place] [address]  Phone: [phone] Fax: [phone]    The doctors have asked that we provide their patients with the following 2 reminders -- prescription refills can take up to 72 hours, and a friendly reminder that in the future you can use your [facility] account to request refills: yes      Requesting an RX refill or new RX.  Is this a refill or new RX:   RX name and strength ondansetron (ZOFRAN-ODT) 4 MG TbDL  Is this a 30 day or 90 day RX:   Pharmacy name and phone #  [facility] - [place] [address]  Phone: [phone] Fax: [phone]  The doctors have asked that we provide their patients with the following 2 reminders -- prescription refills can take up to 72 hours, and a friendly reminder that in the future you can use your [facility] account to request refills: yes    Requesting an RX refill or new RX.  Is this a refill or new RX:   RX name and strength amLODIPine (NORVASC) 5 MG tablet  Is this a 30 day or 90 day RX: 90  Pharmacy name and phone #[facility] - [place] [address]  Phone: [phone] Fax: [phone]  The doctors have asked that we provide their patients with the following 2 reminders -- prescription refills can take up to 72 hours, and a friendly reminder that in the future you can use your [employer] account to request refills: yes      Requesting an RX refill or new RX.  Is this a refill or new RX:   RX name and strength losartan (COZAAR) 100 MG  tablet  Is this a 30 day or 90 day RX: 90  Pharmacy name and phone # [facility] - [place] [address]  Phone: [phone] Fax: [phone]    The doctors have asked that we provide their patients with the following 2 reminders -- prescription refills can take up to 72 hours, and a friendly reminder that in the future you can use your [facility] account to request refills: yes    Requesting an RX refill or new RX.  Is this a refill or new RX:   RX name and strength carvediloL (COREG) 25 MG tablet  Is this a 30 day or 90 day RX:   Pharmacy name and phone #[facility] - [place] [address]  Phone: [phone] Fax: [phone]  :    The doctors have asked that we provide their patients with the following 2 reminders -- prescription refills can take up to 72 hours, and a friendly reminder that in the future you can use your [facility] account to request refills: yes      Comment: Patient need 3 refills for Rx

## 2023-12-12 RX ORDER — TIZANIDINE 4 MG/1
4 TABLET ORAL 3 TIMES DAILY PRN
Qty: 40 TABLET | Refills: 12 | Status: SHIPPED | OUTPATIENT
Start: 2023-12-12

## 2023-12-31 ENCOUNTER — EXTERNAL CHRONIC CARE MANAGEMENT (OUTPATIENT)
Dept: PRIMARY CARE CLINIC | Facility: CLINIC | Age: 58
End: 2023-12-31
Payer: MEDICARE

## 2023-12-31 PROCEDURE — 99490 CHRNC CARE MGMT STAFF 1ST 20: CPT | Mod: PBBFAC | Performed by: INTERNAL MEDICINE

## 2023-12-31 PROCEDURE — 99490 CHRNC CARE MGMT STAFF 1ST 20: CPT | Mod: S$PBB,,, | Performed by: INTERNAL MEDICINE

## 2024-01-04 ENCOUNTER — PROCEDURE VISIT (OUTPATIENT)
Dept: NEUROLOGY | Facility: CLINIC | Age: 59
End: 2024-01-04
Payer: MEDICARE

## 2024-01-04 VITALS
HEART RATE: 74 BPM | DIASTOLIC BLOOD PRESSURE: 73 MMHG | BODY MASS INDEX: 50.02 KG/M2 | SYSTOLIC BLOOD PRESSURE: 132 MMHG | HEIGHT: 64 IN | WEIGHT: 293 LBS

## 2024-01-04 DIAGNOSIS — E66.01 CLASS 3 SEVERE OBESITY DUE TO EXCESS CALORIES WITH SERIOUS COMORBIDITY AND BODY MASS INDEX (BMI) OF 45.0 TO 49.9 IN ADULT: ICD-10-CM

## 2024-01-04 DIAGNOSIS — G43.711 CHRONIC MIGRAINE WITHOUT AURA, WITH INTRACTABLE MIGRAINE, SO STATED, WITH STATUS MIGRAINOSUS: ICD-10-CM

## 2024-01-04 DIAGNOSIS — G47.33 OBSTRUCTIVE SLEEP APNEA: ICD-10-CM

## 2024-01-04 DIAGNOSIS — I69.354 HEMIPARESIS AFFECTING LEFT SIDE AS LATE EFFECT OF STROKE: Primary | ICD-10-CM

## 2024-01-04 PROCEDURE — 99999PBSHW PR PBB SHADOW TECHNICAL ONLY FILED TO HB: Mod: PBBFAC,,,

## 2024-01-04 PROCEDURE — 64615 CHEMODENERV MUSC MIGRAINE: CPT | Mod: PBBFAC | Performed by: PSYCHIATRY & NEUROLOGY

## 2024-01-04 PROCEDURE — 64615 CHEMODENERV MUSC MIGRAINE: CPT | Mod: S$PBB,,, | Performed by: PSYCHIATRY & NEUROLOGY

## 2024-01-04 RX ORDER — DICLOFENAC SODIUM 50 MG/1
50 TABLET, DELAYED RELEASE ORAL 3 TIMES DAILY PRN
Qty: 30 TABLET | Refills: 6 | Status: SHIPPED | OUTPATIENT
Start: 2024-01-04 | End: 2024-01-04 | Stop reason: SDUPTHER

## 2024-01-04 RX ORDER — DICLOFENAC SODIUM 50 MG/1
50 TABLET, DELAYED RELEASE ORAL 3 TIMES DAILY PRN
Qty: 30 TABLET | Refills: 6 | Status: SHIPPED | OUTPATIENT
Start: 2024-01-04

## 2024-01-04 RX ADMIN — ONABOTULINUMTOXINA 200 UNITS: 100 INJECTION, POWDER, LYOPHILIZED, FOR SOLUTION INTRADERMAL; INTRAMUSCULAR at 01:01

## 2024-01-04 NOTE — PROCEDURES
Follow up:  Migraines are stable     Prior note:   Reports worsening LBP after COVID in Jan   On antibiotics for bronchitis     Prior note:   Reports nausea   Knees buckle frequently   No benefit from new shoes   Not Using cane     Prior note:   Reports overall worse HA   Feels tremors in whole body lasting 3-4 hrs   Tried ativan - helped for a few hours     Prior note:   She is grappling with grief of loss of her sister   Pending grief counseling      Prior note:   S/p course of prednisone for GI allergic reaction (scratch throat - seafood related)     Prior note:   Migraine Hz increased during storm - almost daily   One episode of lightheadedness. No SOB, CP       Prior note:   Reports episode of lightheadedness 2 days ago      Prior note:   S/p COVID vaccine      Prior note:   Reports more physical fatigue   taking 2 naps a day   L sided severe migraine lasted 2 days. Took ubrelvy, motrin, zanaflex      Prior note:   3 days of severe L sided HA + nausea   Gradual onset   Ubrelvy, zanaflex, flurbiprofen - not helping   Vision - nml      Prior note:   HA stable   Try Ubrelvy again      Prior note:   independent left and right parietal ice prick HA      Prior note:   HA much improved   Only 2 since last visit      Prior note:   HA are more frequent   Taking 1600 mg motrin + zanaflex   Went to ER recently      Prior note:   HA overall stable in Hz and intensity   Reports a wearing off effect of BOTOX since last week   Taking zanaflex 8 mg TID        Prior note:   HA started 12/24   She feels BOTOX wore off last week   Reports GI distress from taking to many motrin      Prior note:   4/week HA - L vertex   Jabs - vertex either sides      She reports migraine that woke her up in the morning - associated with L side facial droop      Prior note:   She gets acceptable relief for 2.5 months after BOTOX      Prior note:   No recurrent stroke symptoms   starting having whole body tremors since this AM. Took 1 tablet of  lorazepam   Last night had a HA, took trazodone       Admit Date: 4/11/2018  2:03 PM   Discharge Date and Time: 4/12/2018  8:30 PM   History of Present Illness: Ms. Chawla is a 53 yo F with afib on Eliquis (last dose this am), prior cardiac arrest with associated acute ischemic stroke with residual mild L sided weakness, CAD with ICD in situ, HTN, and HLD who presented with acute R sided weakness and sensation changes.  She originally called EMS for palpitations, but developed acute right sided facial droop and RUE weakness at 1:40pm today, after EMS had arrived.  She denies changes to speech or vision.  SBP en route 200/100.  She is ineligible for tPA 2/2 Eliquis use.  She is not suspected to have an LVO.  She will be admitted to [facility] for observation overnight.     Hospital Course (synopsis of major diagnoses, care, treatment, and services provided during the course of the hospital stay): Ms. Chawla was admitted for acute stroke workup. Pt with pacemaker so unable to obtain MRI Brain; CTA H/N demonstrated no evidence acute infarction, though did exhibit noncalcified plaquing of carotid bifurcations and proximal ICAs with < 50% stenosis, so Plavix was added to pt's daily home regimen. Concerned for TIA at this time though Migraine very high on differential due to pt's hx headaches, including presently this admission. Hypertensive urgency/emergency also considered due to pt's very elevated levels with EMS. Per chart review, Eliquis was a new medication since 4/3/18 (had switched from Coumadin), however staff Faraz concerned that pt had a potential event while on Eliquis. She wished to switch to Pradaxa as an alternative DOAC (as it has an option for reversal), however changed to Xarelto per pt's insurance coverage.   While admitted, pt given cocktail for HA which she has received previously at the infusion clinic - IV Magnesium, IM Toradol, 2mg IV Morphine, 500cc NS bolus (IV Benadryl not included this time as pt  had received a dose earlier that day prior to contrast imaging.) HA improved somewhat and pt was encouraged to follow up with Dr. Cortez for continued management of botox injections, etc. At that time, pt also found with hyponatremia; d/c'd Clorthalidone and plan was made for pt to [facility]16[facility]18 for repeat CMP to evaluate Na level and BP check since discontinuing this medication.  Ms. Chawla was evaluated and treated by PT, OT, and SLP who recommend d/c with outpatient PT and OT. She was discharged home 4/12/18 with [facility] follow-up Monday      Prior note:   2 weeks ago BOTOX effect wore off   Used up all her percocet   3 wks h/o:   Reports frequent falls - falling forward, no LOC, no injuries, able to protect falls by hands   triggers - unknown   Also occ stumbling   Dragging L foot   No Pain in back or legs   No numbness or weakness in legs   No B/B incontinence   No lightheadedness      Prior note:    Flare up of TMJ and HA during daughter's wedding   NSAIDS helped   2 weeks ago BOTOX effect wore off      Prior note:   2 weeks ago BOTOX effect wore off   Has severe spasm and pain in the traps catalina   Weather change has provoked severe migraine + nausea   She started coumadin       Prior note:   3 weeks ago BOTOX effect wore off   She reports severe daily HA    During BOTOX periods she feels she  her HA. Much less intense      Prior note:   The patient is a 51-year-old female who [facility]. Since her last visit, she reports growing frustration   about the fact that nothing has helped her with regards to her headaches. She   continues to experience daily headaches. She takes mefenamic acid and   tizanidine every night, which only provides her two hours of relief. She is   unable to sleep because of the refractory headaches. She is incapacitated   because of severe headaches. She reports minimal relief with infusions  "that she  gets on a periodic basis. She does report an improvement overall with the   Botox. However, this effect has worn off in the last two weeks. She also   reports an episode wherein she fell with loss of consciousness, which was not   provoked. As a result, she injured her ankle and is currently wearing a boot.      Prior note:   since last week - Reports vertigo for 6 - 7 minutes upto 3 times daily   Nearly daily severe HA - no response to ponstel , compazine   She is late for her BOTOX - last 2 weeks were painful       Follow up:   R sided Headache is constant max at TMJ on R   Prior note:   She reports new episodes of R face tingling. Sh also reports 2 episodes of blurred vision lasting 15 minutes. These hapended while watching TV. Her pain remains unchanged   Follow up:   2 days of severe HA during Thanksgiving   Pounding bifrontal region   Pain worse over L eye brow   Follow up:   Reports bifrontal severe HA (worse on L) with no nausea with sudden onset . Occurs daily   NO note:  I found no papilledema or other changes to suggest elevated intracranial pressure or other alternative etiology for her headaches. Repeat exam in two years.  HA are less frequent and less intense.   (R levator scapula spasm)   symptoms better with lateral flexion to L   Prior note:   She still has daily HA with severe sharp stabbing pain behind L eye lasting for 15 sec   Prior note:   Daily pain. 2/week - nausea.  Shu Lares RN at 9/17/2014 4:53 PM  Pt presented to clinic for medical advice. Pt is seen by Dr. Paredes and Dr. Cortez.  1) She went ER on 9/13/14 for a migraine. She was given Reglan, Benadryl, MSO4 and IVF. A couple days later she developed an all over body "tremor". She states "I think I have Parkinson's". - Per Dr. Paredes, medication related tremor (Reglan & Benadryl). Informed her it should wear off in a few days. If not, she is to call and let us know.  2) Headaches - triggered by insomnia.   Current " "meds:  Ketoprofen - she took it once and said her "throat closed up" and she's not supposed to have anything with aspirin in it.  Nortriptyline - she was taking it at night, but it didn't help her sleep, so she stopped it.  Per Dr. Cortez, discontinue Ketoprofen and Nortriptyline. Start Effexor XR 37.5mg QD, tizanidine 4mg BID PRN headache and Klonopin 0.25 - 0.5mg QHS PRN. Will schedule pt for sphenocath procedure within the next week.   Prior note:   48 yo woman with history of HTN and asthma, both reportedly controlled, in hospital early 2013 after "passed out" and became unresponsive. CPR in the field for 8 minutes until EMS arrived. Per ED note, on arrival she was found to be in PEA, given epinephrine. Vfib/Vtach was achieved which was shocked x1. Patient converted to sinus tachycardia after shock. I do not know the time course for the above treatment. On arrival to facility patient was in sinus tachycardia with strong pulses, intubated and unresponsive. ET tube placement was confirmed with direct laryngoscopy and good bilateral breath sounds were heard after the tube was pulled back 2 cm. Reported to have "withdrawal" movements in ER during stabilization, then sedated and cooling protocol initiated. Had remarkable   recovery and first seen in clinic in April 2013.   Headache history: cluster   Age of onset - 2012   Location - behind L eye   Nature of pain - sharp   Prodrome - no   Aura - No   Duration of headache - 6-7 min   Time to peak intensity -   Pain scale - range of intensity - 9/10   Frequency - daily   Status Migrainosus history - 1-3 days   Time of day of most headaches- anytime   Associated symptoms with the headache:   Red eyes   swelling of L face   Headache history: Migraine   Age of onset - 2012   Location - bifrontal   Nature of pain - Pounding   Prodrome - no   Aura - No   Duration of headache - > 4 hrs   Time to peak intensity -   Pain scale - range of intensity - 9/10   Frequency - 5/week " "  Status Migrainosus history - 1-3 days   Time of day of most headaches- anytime   Associated symptoms with the headache:   Meningeal symptoms - photophobia, phonophobia, exercise intolerance +   Nausea/vomitting +   Nasal drainage   Visual blurriness +   Pallor/flushing   Dizziness   Vertigo   Confusion   Impaired concentration +   Pain worsened with physical activity +   Neck pain +   Symptoms of increased intracranial pressure:   Whooshing sounds - no   Visual spots/blotches - no   Headache Triggers: unknown   Other comorbid conditions:   Anxiety - no   Motion sickness symptom - no       Treatment history:   Resolution of headache with sleep - yes       Meds tried:   Trigger points involvin/9/15 helped for 1 day   Site: Right   Levator scapula   Pharmacological agent:   0.5% Sensorcaine solution   No complications were noted.  Supraorbital block - helped for 2 days   Tylenol - not help   imitrex - chest tightness   alleve - help   topamax - not helping   Neurontin - not helping   Paxil, Elavil - not help   seroquel - nightmare   Ketoprofen - she took it once and said her "throat closed up" and she's not supposed to have anything with aspirin in it.  Flurbiprofen - constipation   Nortriptyline - she was taking it at night, but it didn't help her sleep, so she stopped it.  reglan - parkinsonism   zanaflex - help   Toradol 30 mg IM inj at home - too expensive   BOTOX round #1 9/3/15 - helped (R levator scapula spasm)   Round #2 12/3/15 - helped   Round#3 3/316 - helped   Round #4 16 - helped   BOTOX#5 9/15/16 - helped     BOTOX#6 - 16 - helped   BOTOX#7 - 3/9/17 - helped    BOTOX#8 - 17 - helped    BOTOX#9 8/10/17 - helped   BOTOX#10 10/19/17 - helped   BOTOX#11 17 - helped   BOTOX#12 3/7/18 - helped   BOTOX#13 18 - helped    BOTOX#14 18 - helped     BOTOX#15 10/19/18 - helped      BOTOX#16 18 - helped   BOTOX#17 3/13/19 - helped    BOTOX#18 19 - helped     BOTOX#19 " 8/1/19 - helped      BOTOX#20 10/11/19 - helped     BOTOX#21 12/19/19 - helped   BOTOX#22 3/10/20 - helped    BOTOX#23 6/26/20 - helped   BOTOX#24 11/12/20 - helped  BOTOX#25 1/21/21 - helped   BOTOX#26 4/22/21 - helped   BOTOX#27 7/6/21 - helped    BOTOX#28 12/10/21 - helped     BOTOX#29 5/19/22 - helped   BOTOX#30 8/25/22 - helped  BOTOX#31 12/2/22 - helped  BOTOX#32 3/1/23 - helped  BOTOX#33 7/6/23 - helped  BOTOX#34 9/28/23 - helped    AIMOVIG (sept 1rst week, Oct first week, Nov first wk 140 mg, Dec first wk 140 mg, Jan, Feb) no benefit   Constipation +      Can not do RFA - pt has pacemaker   Procedure: IOVERA peripheral nerve focus cold therapy (non ablative cryotherapy) 12/30/15 - not help   Indication: Cryotherapy of select peripheral nerves of the head was performed to treat chronic headaches that failed to respond to several preventive pharmacological therapies.   Sedation: None   Informed consent: The procedure, risks, benefits and options were discussed with the patient. There are no contraindications to the procedure. The patient expressed understanding and agreed to the procedure. I verify that I personally obtained the patient's consent prior to the start of the procedure.  Location:  Bilateral Supra orbital nerve (3 cycles on R and 1 cycle on L)   Bilateral Occipital nerve   3 cycles   Pain relief: Pain score reduced 10/10->0/10   9/26 Bilateral Sphenopalatine Ganglion and bilateral Maxillary Branch (Trigeminal Nerve) Block - no help   ONB - not help     georges Pagan, RN at 12/31/2014 3:54 PM                           Status: Signed                                       Expand All Collapse All   HEADACHE FASTTRACK  PAIN 7/10 NAUSEA 0/10  ROUND 1: TORADOL, IMITREX, MORPHINE, BENADRYL, AND MAGNESIUM  PAIN 7/10 NAUSEA 0/10  PT STATED SHE WAS READY TO GO HOME AND GO TO SLEEP. PT D/C'ED IN [facility]                              Shannon Pagan RN at 10/5/2015 12:49 PM                           Status: Signed                                        Expand All Collapse All   HEADACHE FASTTRACK  PAIN 8/10 NAUSEA 10/10  FIRST ROUND: tORADOL, BENADRYL, COMPAZINE, IMITREX, MAGNESIUM, AND VALPROATE.  PAIN 1/10 NAUSEA 0/10  PT READY TO GO HOME AND FEELING BETTER. PT LEFT IN NAD WITH FAMILY MEMBER  TOTAL TIME: 9810-3144                         Shannon Pagan RN at 10/20/2015 3:05 PM                           Status: Signed                                       Expand All Collapse All   HEADACHE FASTTRACK  PAIN 10/10 NAUSEA 0/10  FIRST ROUND: tORADOL, BENADRYL, COMPAZINE, IMITREX, MAGNESIUM, AND VALPROATE.  BP BEING MONITORED EVERY 15 MIN AND REMAINED 170'S/80-90'S. DR CHOPRA NOTIFIED AND STATED TO MAKE SURE BP DID NOT EXCEED 180 SYSTOLIC OR PT SHOULD REPORT TO ED. BP AT 1500 183/92. DR CHOPRA NOTIFIED AND GAVE ORDER TO STOP INFUSION AND SEND PATIENT TO ED. PT BROUGHT TO ED BY INFUSION NURSE VIA WHEELCHAIR.   PAIN 8/10 NAUSEA 0/10  TOTAL TIME: 4330-8590                                                     Progress Notes                                               Shannon Pagan RN at 2/24/2016 1:36 PM       Status: Signed                  Expand All Collapse All   HEADACHE FASTTRACK  PAIN 10/10 NAUSEA 7/10  FIRST ROUND: tORADOL, BENADRYL, AND MORPHINE-BP RANGING FROM 177-203/84-92, HR 60-82  MD NOTIFIED AND GAVE ORDERS TO SEND TO ED. PT LEFT VIA W/C WITH INFUSION NURSE ON WAY TO ED.            Headache risk factors:   H/o TBI - [facility] (2013) + neck sprain   H/o Meningitis - no   F/h Aneurysms - no       Review of Systems   Constitutional: Negative.   HENT: Negative.   Eyes: Negative.   Respiratory: Negative.   Cardiovascular: Negative.   Gastrointestinal: Negative.   Endocrine: Negative.   Genitourinary: Negative.   Musculoskeletal: Negative.   Skin: Negative.   Allergic/Immunologic: Negative.   Hematological: Negative.   Psychiatric/Behavioral: insomnia      Objective:     Physical Exam   Constitutional: She is oriented to person, place, and  time. She appears well-developed.   obesity   Psychiatric: She has a normal mood and flat affect. Her behavior is normal. Judgment and thought content normal.   Headache Exam:  Optic disc is flat OU   Temples appear symmetric  No V1,V2,V3 distribution allodynia/hyperalgesia catalina   No facial swelling  No gross TMJ abnormalities   No ptosis   No conj injection   No meningismus   No neck stiffness  No C spine tenderness  Cervical facet tenderness on palpation catalina   No tender points over trapezium   catalina supraorbital nerve exit point tenderness  catalina occipital nerve exit point tenderness  No auriculotemporal nerve tenderness   Tenderness of levator scapula catalina    Gait - wide based gait                       Assessment:                              1.  Occipital neuralgia                              2.  Cervicalgia                              3.  4  5  6 Chronic migraine without aura, with intractable migraine, so stated, with status migrainosus (post traumatic) post concussive?  Depression   TMJ - R sided   Insomnia - SHIRA      Head CT  Findings: There is no evidence of acute or recent major vascular territory cerebral infarction, parenchymal hemorrhage, or intra-axial mass.  There are no extra-axial masses or abnormal fluid collections.  The ventricular system is within normal limits of size for age and shows no distortion by mass-effect or evidence of hydrocephalus.  There is no fracture or destructive osseous lesion.  The extracranial soft tissues are unremarkable.  The visualized paranasal sinuses and mastoid air cells are clear.   Impression    No acute intracranial abnormality.  ______________________________________     Electronically signed by resident: KRISSY MAYERS MD  Date: 03/14/16  Time: 17:09            Plan:                              1. Prophylactic medication -   Despiramine 25 mg AM (for dizziness) PRN  zoloft 25 mg daily    Aricept 20 mg daily   Neurontin 900 mg TID    Magnesium 200 mg daily  Topamax  200 mg BID   Clonazepam BID for anxiety PRN  On trazodone   Cont B2, B6 supplements  On bellsomra    2, Breakthrough headache - zanaflex 8 mg Q8, etodolac - insurance will not pay for no clinical reason   Diclofenac   Multiple alternative treatment options were offered to the patient   3. Refrain from over counter pain medications. Discussed medication overuse headache.cont Magnesium 400 mg PO BID   4. Occipital neuralgia - If medical management is ineffective may consider occipital nerve blocks.   5. I again urged the patient to keep a headache calender.    f/up Dr. Rock for TBI   Vit D supplements    f/up sleep clinic - CPAP - waiting for new machine   Cont AJOVY (April 2019- ) - gap Feb-April 2021)  No side effects     Consider  ONB 2 weeks prior to next BOTOX       BOTOX treatment response:   Prior to initiating BOTOX, the patient had 28   migraine days per month on average. This meets criteria for chronic migraine.   After starting BOTOX, the patient experienced a reduction in  25  days per month   After starting BOTOX, the patient experienced a reduction in > 100 hours of migraine symptoms per month   After starting BOTOX, the patient experienced a 50% reduction in burden of migraine days per month   Based on this information, BOTOX is medically necessary for the management of the patient's chronic migraine.     BOTOX Injection intervals:   Patient reports a 'wearing off effect' prior to the subsequent BOTOX injections at 12 weeks. This occurs at week 9-10  Symptoms of this a 'wearing off effect' include - worsening of migraine headache frequency and intensity   Medications used during the 'wearing off effect' period include flurbiprofen, zanaflex  Based on this information, it is medically necessary for the patient to receive BOTOX therapy at an interval of every 10 weeks for the management of chronic migraine.    BOTOX was performed as an indicated therapy for intractable chronic migraine headaches given  that the patient failed > 5 prophylactic medications  Botulinum Toxin Injection Procedure   Pre-operative diagnosis: Chronic migraine   Post-operative diagnosis: Same   Procedure: Chemical neurolysis   After risks and benefits were explained including bleeding, infection, worsening of pain, damage to the areas being injected, weakness of muscles, loss of muscle control, dysphagia if injecting the head or neck, facial droop if injecting the facial area, painful injection, allergic or other reaction to the medications being injected, and the failure of the procedure to help the problem, a signed consent was obtained.   The patient was placed in a comfortable area and the sites to be treated were identified.The area to be treated was prepped three times with alcohol and the alcohol allowed to dry. Next, a 30 gauge needle was used to inject the medication in the area to be treated.   Area(s) injected:   Total Botox used: 155 Units   Botox wastage: 45 Units   Injection sites:     muscle bilaterally ( a total of 5 units divided into 2 sites)   Procerus muscle (5 units)   Frontalis muscle bilaterally (a total of 20 units divided into 4 sites)   Temporalis muscle bilaterally (a total of 50 units divided into 8 sites)   Occipitalis muscle bilaterally (a total of 30 units divided into 6 sites)   Cervical paraspinal muscles (a total of 20 units divided into 4 sites)   Trapezius muscle bilaterally (a total of 30 units divided into 6 sites)     Complications: none       RTC for the next Botox injection: 10 weeks    Procedures

## 2024-01-05 ENCOUNTER — PATIENT MESSAGE (OUTPATIENT)
Dept: ELECTROPHYSIOLOGY | Facility: CLINIC | Age: 59
End: 2024-01-05
Payer: MEDICARE

## 2024-01-05 NOTE — TELEPHONE ENCOUNTER
RX call attempt 1 regarding Ajovy from [facility]. Patient reached -- shipping 1/23 for 1/24 arrival with patients consent. Patients next injection is scheduled for 12/25. Patient denied the need for any supplies with this shipment.   copay $3.90 (patient stated she would call back on 1/21 am to provide cc info) @ 004.    Heterosexual

## 2024-01-09 ENCOUNTER — CLINICAL SUPPORT (OUTPATIENT)
Dept: CARDIOLOGY | Facility: HOSPITAL | Age: 59
End: 2024-01-09
Payer: MEDICARE

## 2024-01-09 ENCOUNTER — CLINICAL SUPPORT (OUTPATIENT)
Dept: CARDIOLOGY | Facility: HOSPITAL | Age: 59
End: 2024-01-09
Attending: INTERNAL MEDICINE
Payer: MEDICARE

## 2024-01-09 DIAGNOSIS — I44.2 ATRIOVENTRICULAR BLOCK, COMPLETE: ICD-10-CM

## 2024-01-09 PROCEDURE — 93295 DEV INTERROG REMOTE 1/2/MLT: CPT | Mod: ,,, | Performed by: INTERNAL MEDICINE

## 2024-01-17 LAB
OHS CV AF BURDEN PERCENT: < 1
OHS CV BIV PACING PERCENT: 96 %
OHS CV DC REMOTE DEVICE TYPE: NORMAL
OHS CV RV PACING PERCENT: 96 %

## 2024-01-26 ENCOUNTER — PATIENT MESSAGE (OUTPATIENT)
Dept: PSYCHIATRY | Facility: CLINIC | Age: 59
End: 2024-01-26
Payer: MEDICARE

## 2024-01-31 ENCOUNTER — EXTERNAL CHRONIC CARE MANAGEMENT (OUTPATIENT)
Dept: PRIMARY CARE CLINIC | Facility: CLINIC | Age: 59
End: 2024-01-31
Payer: MEDICARE

## 2024-01-31 PROCEDURE — 99490 CHRNC CARE MGMT STAFF 1ST 20: CPT | Mod: PBBFAC | Performed by: INTERNAL MEDICINE

## 2024-01-31 PROCEDURE — 99490 CHRNC CARE MGMT STAFF 1ST 20: CPT | Mod: S$PBB,,, | Performed by: INTERNAL MEDICINE

## 2024-02-05 ENCOUNTER — OFFICE VISIT (OUTPATIENT)
Dept: PSYCHIATRY | Facility: CLINIC | Age: 59
End: 2024-02-05
Payer: MEDICARE

## 2024-02-05 DIAGNOSIS — F33.1 DEPRESSION, MAJOR, RECURRENT, MODERATE: Primary | ICD-10-CM

## 2024-02-05 PROCEDURE — 90834 PSYTX W PT 45 MINUTES: CPT | Mod: 95,,, | Performed by: SOCIAL WORKER
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
## 2024-02-29 ENCOUNTER — EXTERNAL CHRONIC CARE MANAGEMENT (OUTPATIENT)
Dept: PRIMARY CARE CLINIC | Facility: CLINIC | Age: 59
End: 2024-02-29
Payer: MEDICARE

## 2024-02-29 ENCOUNTER — TELEPHONE (OUTPATIENT)
Dept: ELECTROPHYSIOLOGY | Facility: CLINIC | Age: 59
End: 2024-02-29
Payer: MEDICARE

## 2024-02-29 PROCEDURE — 99439 CHRNC CARE MGMT STAF EA ADDL: CPT | Mod: PBBFAC | Performed by: INTERNAL MEDICINE

## 2024-02-29 PROCEDURE — 99490 CHRNC CARE MGMT STAFF 1ST 20: CPT | Mod: S$PBB,,, | Performed by: INTERNAL MEDICINE

## 2024-02-29 PROCEDURE — 99439 CHRNC CARE MGMT STAF EA ADDL: CPT | Mod: S$PBB,,, | Performed by: INTERNAL MEDICINE

## 2024-02-29 PROCEDURE — 99490 CHRNC CARE MGMT STAFF 1ST 20: CPT | Mod: PBBFAC | Performed by: INTERNAL MEDICINE

## 2024-02-29 NOTE — TELEPHONE ENCOUNTER
[employer] CRT-D remote alert received for low Biv Pacing and PVCs.  Has been trending down    Biv pacing 81% and PVC mirza 11%,   Presenting egram shows Bigeminal PVCs    Pt is asymptomatic, states she feels fine. No HF symptoms.  Compliant with Coreg 50 mg po BID      Due for recall 4/2024 with SHIRLEY Wang NP      Update: pt stopped by clinic on 3/1/2024 and pt had questions regarding her earbuds affecting BiV pacing. Reassured pt that it is not affecting Biv pacing, but to keep magnets away from ICD.    Pt states she may have not been taking full dose of BB. She will take full dose as prescribed.                       ___                                        ____________

## 2024-03-01 ENCOUNTER — OFFICE VISIT (OUTPATIENT)
Dept: ORTHOPEDICS | Facility: CLINIC | Age: 59
End: 2024-03-01
Payer: MEDICARE

## 2024-03-01 ENCOUNTER — TELEPHONE (OUTPATIENT)
Dept: ORTHOPEDICS | Facility: CLINIC | Age: 59
End: 2024-03-01

## 2024-03-01 VITALS — BODY MASS INDEX: 50.02 KG/M2 | HEIGHT: 64 IN | WEIGHT: 293 LBS

## 2024-03-01 DIAGNOSIS — M76.71 PERONEAL TENDINITIS OF RIGHT LOWER EXTREMITY: ICD-10-CM

## 2024-03-01 DIAGNOSIS — M79.671 RIGHT FOOT PAIN: Primary | ICD-10-CM

## 2024-03-01 PROCEDURE — 99999 PR PBB SHADOW E&M-EST. PATIENT-LVL IV: CPT | Mod: PBBFAC,,, | Performed by: ORTHOPAEDIC SURGERY

## 2024-03-01 PROCEDURE — 99214 OFFICE O/P EST MOD 30 MIN: CPT | Mod: PBBFAC | Performed by: ORTHOPAEDIC SURGERY

## 2024-03-01 PROCEDURE — 99214 OFFICE O/P EST MOD 30 MIN: CPT | Mod: S$PBB,,, | Performed by: ORTHOPAEDIC SURGERY

## 2024-03-01 RX ORDER — METHYLPREDNISOLONE 4 MG/1
TABLET ORAL
Qty: 1 EACH | Refills: 0 | Status: SHIPPED | OUTPATIENT
Start: 2024-03-01 | End: 2024-04-01 | Stop reason: ALTCHOICE

## 2024-03-01 NOTE — TELEPHONE ENCOUNTER
Patient was seen.    ----- Message from Maria Alejandra Stanley sent at 3/1/2024  8:42 AM CST -----  Regarding: On the elevator  Name of Who is Calling:PUSHPA KEY [[id number]]        What is the request in detail:Pt called states she's on the elevator on her way up. Thanks         Can the clinic reply by [employer] [facility]:No        What Number to Call Back if not in [facility]: Telephone Information:  Mobile          [phone]

## 2024-03-01 NOTE — TELEPHONE ENCOUNTER
----- Message from Concetta Simons sent at 3/1/2024  8:30 AM CST -----  Pt calling to let clinic know she is  in route looking for  parking now     Confirmed patient's contact info below:  Contact Name: Amna Chawla  Phone Number: [phone]
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
## 2024-03-05 NOTE — TELEPHONE ENCOUNTER
Discussed CRT trigger setting with CHRISTINE Delgado from [employer].  Recommended top change CRT-Triggering from RVs-->RVs+Trigger.      Discussed with Dr. Mejia and ok to add at next in clinic.    Will send msg to MA staff to coordinate recall.

## 2024-03-07 ENCOUNTER — PATIENT MESSAGE (OUTPATIENT)
Dept: PSYCHIATRY | Facility: CLINIC | Age: 59
End: 2024-03-07
Payer: MEDICARE

## 2024-03-15 ENCOUNTER — TELEPHONE (OUTPATIENT)
Dept: ELECTROPHYSIOLOGY | Facility: CLINIC | Age: 59
End: 2024-03-15
Payer: MEDICARE
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
## 2024-03-18 ENCOUNTER — CLINICAL SUPPORT (OUTPATIENT)
Dept: CARDIOLOGY | Facility: HOSPITAL | Age: 59
End: 2024-03-18
Attending: INTERNAL MEDICINE
Payer: MEDICARE

## 2024-03-18 ENCOUNTER — OFFICE VISIT (OUTPATIENT)
Dept: ELECTROPHYSIOLOGY | Facility: CLINIC | Age: 59
End: 2024-03-18
Payer: MEDICARE

## 2024-03-18 ENCOUNTER — HOSPITAL ENCOUNTER (OUTPATIENT)
Dept: CARDIOLOGY | Facility: CLINIC | Age: 59
Discharge: HOME | End: 2024-03-18
Payer: MEDICARE

## 2024-03-18 VITALS
SYSTOLIC BLOOD PRESSURE: 128 MMHG | BODY MASS INDEX: 50.02 KG/M2 | HEIGHT: 64 IN | DIASTOLIC BLOOD PRESSURE: 70 MMHG | HEART RATE: 60 BPM | WEIGHT: 293 LBS

## 2024-03-18 DIAGNOSIS — I42.8 NONISCHEMIC CARDIOMYOPATHY: ICD-10-CM

## 2024-03-18 DIAGNOSIS — I44.2 COMPLETE AV BLOCK: ICD-10-CM

## 2024-03-18 DIAGNOSIS — I49.8 OTHER SPECIFIED CARDIAC ARRHYTHMIAS: ICD-10-CM

## 2024-03-18 DIAGNOSIS — I70.0 AORTIC ATHEROSCLEROSIS: ICD-10-CM

## 2024-03-18 DIAGNOSIS — E66.01 CLASS 3 SEVERE OBESITY DUE TO EXCESS CALORIES WITH SERIOUS COMORBIDITY AND BODY MASS INDEX (BMI) OF 45.0 TO 49.9 IN ADULT: ICD-10-CM

## 2024-03-18 DIAGNOSIS — G47.33 OBSTRUCTIVE SLEEP APNEA: ICD-10-CM

## 2024-03-18 DIAGNOSIS — I10 ESSENTIAL HYPERTENSION: ICD-10-CM

## 2024-03-18 DIAGNOSIS — Z86.73 HISTORY OF CVA (CEREBROVASCULAR ACCIDENT): ICD-10-CM

## 2024-03-18 DIAGNOSIS — Z95.810 BIVENTRICULAR AUTOMATIC IMPLANTABLE CARDIOVERTER DEFIBRILLATOR IN SITU: Primary | ICD-10-CM

## 2024-03-18 DIAGNOSIS — I48.0 PAROXYSMAL ATRIAL FIBRILLATION: ICD-10-CM

## 2024-03-18 DIAGNOSIS — Z95.810 PRESENCE OF AUTOMATIC (IMPLANTABLE) CARDIAC DEFIBRILLATOR: ICD-10-CM

## 2024-03-18 DIAGNOSIS — Z86.74 HISTORY OF SUDDEN CARDIAC ARREST: ICD-10-CM

## 2024-03-18 LAB
OHS QRS DURATION: 156 MS
OHS QTC CALCULATION: 468 MS

## 2024-03-18 PROCEDURE — 99999 PR PBB SHADOW E&M-EST. PATIENT-LVL IV: CPT | Mod: PBBFAC,,, | Performed by: NURSE PRACTITIONER

## 2024-03-18 PROCEDURE — 93005 ELECTROCARDIOGRAM TRACING: CPT | Mod: PBBFAC | Performed by: INTERNAL MEDICINE

## 2024-03-18 PROCEDURE — 93284 PRGRMG EVAL IMPLANTABLE DFB: CPT | Mod: 26,,, | Performed by: INTERNAL MEDICINE

## 2024-03-18 PROCEDURE — 93284 PRGRMG EVAL IMPLANTABLE DFB: CPT

## 2024-03-18 PROCEDURE — 99214 OFFICE O/P EST MOD 30 MIN: CPT | Mod: PBBFAC,25 | Performed by: NURSE PRACTITIONER

## 2024-03-18 PROCEDURE — 93010 ELECTROCARDIOGRAM REPORT: CPT | Mod: S$PBB,,, | Performed by: INTERNAL MEDICINE

## 2024-03-18 PROCEDURE — 99214 OFFICE O/P EST MOD 30 MIN: CPT | Mod: S$PBB,,, | Performed by: NURSE PRACTITIONER

## 2024-03-24 ENCOUNTER — HOSPITAL ENCOUNTER (EMERGENCY)
Facility: HOSPITAL | Age: 59
Discharge: HOME | End: 2024-03-24
Attending: INTERNAL MEDICINE
Payer: MEDICARE

## 2024-03-24 ENCOUNTER — PATIENT MESSAGE (OUTPATIENT)
Dept: INTERNAL MEDICINE | Facility: CLINIC | Age: 59
End: 2024-03-24
Payer: MEDICARE

## 2024-03-24 VITALS
SYSTOLIC BLOOD PRESSURE: 176 MMHG | TEMPERATURE: 98 F | HEIGHT: 64 IN | HEART RATE: 81 BPM | RESPIRATION RATE: 16 BRPM | BODY MASS INDEX: 49.51 KG/M2 | WEIGHT: 290 LBS | OXYGEN SATURATION: 98 % | DIASTOLIC BLOOD PRESSURE: 75 MMHG

## 2024-03-24 DIAGNOSIS — N30.01 ACUTE CYSTITIS WITH HEMATURIA: Primary | ICD-10-CM

## 2024-03-24 LAB
BILIRUBIN, POC UA: NEGATIVE
BLOOD, POC UA: ABNORMAL
CLARITY, POC UA: CLEAR
COLOR, POC UA: YELLOW
GLUCOSE, POC UA: NEGATIVE
KETONES, POC UA: NEGATIVE
LEUKOCYTE EST, POC UA: ABNORMAL
NITRITE, POC UA: NEGATIVE
PH UR STRIP: 5.5 [PH]
PROTEIN, POC UA: ABNORMAL
SPECIFIC GRAVITY, POC UA: 1.02
UROBILINOGEN, POC UA: 0.2 E.U./DL

## 2024-03-24 PROCEDURE — 81003 URINALYSIS AUTO W/O SCOPE: CPT | Mod: ER

## 2024-03-24 PROCEDURE — 99284 EMERGENCY DEPT VISIT MOD MDM: CPT | Mod: ER

## 2024-03-24 PROCEDURE — 25000003 PHARM REV CODE 250: Mod: ER | Performed by: INTERNAL MEDICINE

## 2024-03-24 RX ORDER — PHENAZOPYRIDINE HYDROCHLORIDE 100 MG/1
200 TABLET, FILM COATED ORAL
Status: COMPLETED | OUTPATIENT
Start: 2024-03-24 | End: 2024-03-24

## 2024-03-24 RX ORDER — NITROFURANTOIN 25; 75 MG/1; MG/1
100 CAPSULE ORAL
Status: COMPLETED | OUTPATIENT
Start: 2024-03-24 | End: 2024-03-24

## 2024-03-24 RX ORDER — PHENAZOPYRIDINE HYDROCHLORIDE 200 MG/1
200 TABLET, FILM COATED ORAL 3 TIMES DAILY
Qty: 6 TABLET | Refills: 0 | Status: SHIPPED | OUTPATIENT
Start: 2024-03-24 | End: 2024-03-26

## 2024-03-24 RX ORDER — NITROFURANTOIN 25; 75 MG/1; MG/1
100 CAPSULE ORAL 2 TIMES DAILY
Qty: 10 CAPSULE | Refills: 0 | Status: SHIPPED | OUTPATIENT
Start: 2024-03-24 | End: 2024-03-29

## 2024-03-24 RX ADMIN — PHENAZOPYRIDINE 200 MG: 100 TABLET ORAL at 08:03

## 2024-03-24 RX ADMIN — NITROFURANTOIN MONOHYDRATE/MACROCRYSTALS 100 MG: 25; 75 CAPSULE ORAL at 08:03

## 2024-03-25 ENCOUNTER — TELEPHONE (OUTPATIENT)
Dept: NEUROLOGY | Facility: CLINIC | Age: 59
End: 2024-03-25
Payer: MEDICARE

## 2024-03-25 ENCOUNTER — PATIENT MESSAGE (OUTPATIENT)
Dept: NEUROLOGY | Facility: CLINIC | Age: 59
End: 2024-03-25
Payer: MEDICARE

## 2024-03-25 ENCOUNTER — TELEPHONE (OUTPATIENT)
Dept: INTERNAL MEDICINE | Facility: CLINIC | Age: 59
End: 2024-03-25
Payer: MEDICARE

## 2024-03-25 ENCOUNTER — PATIENT OUTREACH (OUTPATIENT)
Dept: EMERGENCY MEDICINE | Facility: HOSPITAL | Age: 59
End: 2024-03-25
Payer: MEDICARE

## 2024-03-25 DIAGNOSIS — G43.711 CHRONIC MIGRAINE WITHOUT AURA, WITH INTRACTABLE MIGRAINE, SO STATED, WITH STATUS MIGRAINOSUS: Primary | ICD-10-CM

## 2024-03-25 NOTE — TELEPHONE ENCOUNTER
Called pt to schedule next Botox. Pt did not answer. I left a voice mail stating I scheduled her apt for 5/1 at 2 pm. Told them also that if this does not work to feel free to call back to reschedule.

## 2024-03-25 NOTE — ED PROVIDER NOTES
Encounter Date: 3/24/2024    SCRIBE #1 NOTE: I, Yovani Manzanares, am scribing for, and in the presence of,  Jose Ramon Morales MD. I have scribed the following portions of the note - Other sections scribed: HPI,ROS,PE.       History     Chief Complaint   Patient presents with    Abdominal Pain     Lower abd pain w/ urinary frequency since yesterday. Nausea reported.      Amna Chawla is a 58 y.o. female, with a PMHx of atrial fibrillation, HTN and thyroid disease, who presents to the ED with abdominal pain, nausea, and urinary frequency onset 1 day ago. No other exacerbating or alleviating factors. Denies vomiting or other associated symptoms.      The history is provided by the patient. No  was used.     Review of patient's allergies indicates:   Allergen Reactions    Aspirin Hives    Imitrex [sumatriptan] Palpitations    Penicillins Hives and Swelling    Shellfish containing products Anaphylaxis     seafood    Reglan [metoclopramide hcl] Other (See Comments)     Parkinsonism      Past Medical History:   Diagnosis Date    Anticoagulant long-term use     Anxiety     Asthma     Atrial fibrillation     Brain anoxic injury     Cervicalgia 8/28/2014    CHI (closed head injury) 2/19/2013    Convulsion 5/30/2015    Decreased ROM of left shoulder 4/12/2017    Defibrillator activation 2013    Depression     Heart block     History of sudden cardiac arrest 2/2013    PEA arrest with subsequent long-QT    Hx of psychiatric care     Hypertension     Left atrial enlargement 4/11/2018    Pacemaker 2013    Paresthesia 11/1/2013    Prolonged Q-T interval on ECG 2/8/2013    Psychiatric problem     Seizures     Sleep difficulties     Stroke     weakness lt side    Therapy     Thyroid disease     Upper airway resistance syndrome 2/21/2017     Past Surgical History:   Procedure Laterality Date    breast reduction      CARDIAC DEFIBRILLATOR PLACEMENT      CARDIAC DEFIBRILLATOR PLACEMENT      CARPAL TUNNEL RELEASE  Right     COLONOSCOPY N/A 5/7/2019    Procedure: COLONOSCOPY;  Surgeon: Juan Coffey MD;  Location: [facility] ENDO (2ND FLR);  Service: Endoscopy;  Laterality: N/A;  per DR. Bustamante Pt to have balloon with DR. Coffey due to excesive looping, could not reach cecum - see telephone encounter 3/8/19 and last procedure report dated 6/24/14/ Per Piedad schedule as 90 min case- ERW  pacemaker/AICD Boitronik/   per , ok to hold Xareltox 2 days    COLONOSCOPY N/A 6/2/2022    Procedure: COLONOSCOPY;  Surgeon: Juna Coffey MD;  Location: [facility] ENDO (2ND FLR);  Service: Endoscopy;  Laterality: N/A;  combined orders    ESOPHAGOGASTRODUODENOSCOPY N/A 6/2/2022    Procedure: EGD (ESOPHAGOGASTRODUODENOSCOPY);  Surgeon: Juan Coffey MD;  Location: [facility] ENDO (2ND FLR);  Service: Endoscopy;  Laterality: N/A;  BMI 54; pt requests 1st available [facility]  Fully vaccinated, Hold Xarelto x 2 days per Dr. Mejia and Plavix x 5 days per Dr. Grossman see telephone encounter 4/25/22, Biotronik PM/AICD, prep instr portal and mailed -ml    HERNIA REPAIR      HIATAL HERNIA REPAIR      INSERT / REPLACE / REMOVE PACEMAKER  10/2017    CRT-D upgrade    REVISION OF IMPLANTABLE CARDIOVERTER-DEFIBRILLATOR (ICD) ELECTRODE LEAD PLACEMENT Left 12/7/2020    Procedure: REVISION, INSERTION, ELECTRODE LEAD, ICD;  Surgeon: Avelino Mejia MD;  Location: [facility] EP LAB;  Service: Cardiology;  Laterality: Left;    REVISION OF SKIN POCKET FOR CARDIOVERTER-DEFIBRILLATOR  12/7/2020    Procedure: Revision, Skin Pocket, For Cardioverter-Defibrillator;  Surgeon: Avelino Mejia MD;  Location: [facility] EP LAB;  Service: Cardiology;;    TOTAL REDUCTION MAMMOPLASTY      TRANSESOPHAGEAL ECHOCARDIOGRAPHY N/A 12/7/2020    Procedure: ECHOCARDIOGRAM, TRANSESOPHAGEAL;  Surgeon: Ivory Goodman MD;  Location: [facility] EP LAB;  Service: Cardiology;  Laterality: N/A;    TRANSESOPHAGEAL ECHOCARDIOGRAPHY N/A 12/7/2020    Procedure: ECHOCARDIOGRAM, TRANSESOPHAGEAL;  Surgeon: Dosc Diagnostic  Provider;  [address];  Service: Cardiology;  Laterality: N/A;    TRIGGER FINGER RELEASE Left 8/2/2019    Procedure: RELEASE, TRIGGER FINGER left middle;  Surgeon: Matt Pugh Jr., MD;  Location: [facility];  Service: Plastics;  Laterality: Left;    TRIGGER FINGER RELEASE Right 2/11/2020    Procedure: RELEASE, TRIGGER FINGER middle;  Surgeon: Matt Pugh Jr., MD;  Location: [facility];  Service: Plastics;  Laterality: Right;    TUBAL LIGATION       Family History   Problem Relation Age of Onset    Hypertension Mother     Glaucoma Maternal Grandmother     Glaucoma Paternal Grandmother     COPD Other     Heart failure Other      Social History     Tobacco Use    Smoking status: Never     Passive exposure: Never    Smokeless tobacco: Never   Substance Use Topics    Alcohol use: Yes     Comment: wine, socially    Drug use: No     Review of Systems   Constitutional:  Negative for fever.   HENT:  Negative for sore throat.    Respiratory:  Negative for shortness of breath.    Cardiovascular:  Negative for chest pain.   Gastrointestinal:  Positive for abdominal pain and nausea. Negative for diarrhea and vomiting.   Genitourinary:  Positive for frequency. Negative for dysuria.   Musculoskeletal:  Negative for back pain.   Skin:  Negative for rash.   Neurological:  Negative for weakness and headaches.   Psychiatric/Behavioral:  Negative for behavioral problems.    All other systems reviewed and are negative.      Physical Exam     Initial Vitals [03/24/24 1935]   BP Pulse Resp Temp SpO2   (!) 207/106 81 16 98.2 °F (36.8 °C) 98 %      MAP       --         Physical Exam    Nursing note and vitals reviewed.  Constitutional: She appears well-developed and well-nourished.   HENT:   Head: Normocephalic and atraumatic.   Eyes: Conjunctivae are normal.   Neck: Neck supple.   Normal range of motion.  Cardiovascular:  Normal rate, regular rhythm and normal heart sounds.     Exam reveals no gallop and no  friction rub.       No murmur heard.  Pulmonary/Chest: Breath sounds normal. No respiratory distress. She has no wheezes. She has no rhonchi. She has no rales.   Abdominal: Abdomen is soft. There is abdominal tenderness (upon palpation) in the suprapubic area and left lower quadrant.   Without peritoneal signs.   Musculoskeletal:         General: No edema. Normal range of motion.      Cervical back: Normal range of motion and neck supple.     Neurological: She is alert and oriented to person, place, and time. GCS score is 15. GCS eye subscore is 4. GCS verbal subscore is 5. GCS motor subscore is 6.   Skin: Skin is warm and dry.   Psychiatric: She has a normal mood and affect.         ED Course   Procedures  Labs Reviewed   POCT URINALYSIS W/O SCOPE - Abnormal; Notable for the following components:       Result Value    Blood, UA 1+ (*)     Protein, UA 1+ (*)     Leukocytes, UA 2+ (*)     All other components within normal limits          Imaging Results    None          Medications   nitrofurantoin (macrocrystal-monohydrate) 100 MG capsule 100 mg (100 mg Oral Given 3/24/24 2016)   phenazopyridine tablet 200 mg (200 mg Oral Given 3/24/24 2016)     Medical Decision Making  Amna Chawla is a 58 y.o. female, with a PMHx of atrial fibrillation, HTN and thyroid disease, who presents to the ED with abdominal pain, nausea, and urinary frequency onset 1 day ago. No other exacerbating or alleviating factors. Denies vomiting or other associated symptoms.    Course of ED stay:  Urinalysis shows signs of infection.  Instructions for acute cystitis were given and patient received Macrobid/Pyridium in the emergency department as well as prescriptions for each.  She was advised to follow-up with her primary care physician within the next 3 days for re-evaluation/return to the emergency department if condition worsens.    Amount and/or Complexity of Data Reviewed  Labs: ordered.    Risk  Prescription drug management.             Scribe Attestation:   Scribe #1: I performed the above scribed service and the documentation accurately describes the services I performed. I attest to the accuracy of the note.              This document was produced by a scribe under my direction and in my presence. I agree with the content of the note and have made any necessary edits.     Dr. Morales    03/25/2024 5:13 AM                    Clinical Impression:  Final diagnoses:  [N30.01] Acute cystitis with hematuria (Primary)          ED Disposition Condition    Discharge Stable          ED Prescriptions       Medication Sig Dispense Start Date End Date Auth. Provider    nitrofurantoin, macrocrystal-monohydrate, (MACROBID) 100 MG capsule Take 1 capsule (100 mg total) by mouth 2 (two) times daily. for 5 days 10 capsule 3/24/2024 3/29/2024 Jose Ramon Morales MD    phenazopyridine (PYRIDIUM) 200 MG tablet Take 1 tablet (200 mg total) by mouth 3 (three) times daily. for 2 days 6 tablet 3/24/2024 3/26/2024 Jose Ramon Morales MD          Follow-up Information       Follow up With Specialties Details Why Contact Info    Tiffany Mina MD Internal Medicine Schedule an appointment as soon as possible for a visit in 3 days For reevaluation [address]  [place] [address]  [phone]               Jose Ramon Morales MD  03/25/24 0501

## 2024-03-25 NOTE — TELEPHONE ENCOUNTER
----- Message from Stevan Pedraza sent at 3/25/2024  9:35 AM CDT -----  Regarding: ED F/U  Good morning,  The above named patient was seen in the ED on 3/24/24. She is a part of the [facility] quality work for patients with 2+ chronic conditions for the system.. Please assist with scheduled a PCP appointment before 3/31/24.  Stevan Pedraza

## 2024-03-25 NOTE — PROGRESS NOTES
Patient was seen in the ED on 3/24/24. Phoned patient to assist with Post ED Discharge Navigation. Patient agrred to assistance scheduling a PCP appointment within 7 days. In Basket message sent to PCP staff for scheduling assistance.  Stevan Pedraza

## 2024-03-25 NOTE — TELEPHONE ENCOUNTER
Spoke with pt and she have a bp cuff at home, pt will check her bp tomorrow after she take her meds and let me know.

## 2024-03-26 ENCOUNTER — PATIENT MESSAGE (OUTPATIENT)
Dept: INTERNAL MEDICINE | Facility: CLINIC | Age: 59
End: 2024-03-26
Payer: MEDICARE

## 2024-03-26 LAB
OHS CV BIV PACING PERCENT: 95 %
OHS CV DC REMOTE DEVICE TYPE: NORMAL

## 2024-03-28 ENCOUNTER — PATIENT MESSAGE (OUTPATIENT)
Dept: INTERNAL MEDICINE | Facility: CLINIC | Age: 59
End: 2024-03-28
Payer: MEDICARE

## 2024-03-31 ENCOUNTER — EXTERNAL CHRONIC CARE MANAGEMENT (OUTPATIENT)
Dept: PRIMARY CARE CLINIC | Facility: CLINIC | Age: 59
End: 2024-03-31
Payer: MEDICARE

## 2024-03-31 PROCEDURE — 99490 CHRNC CARE MGMT STAFF 1ST 20: CPT | Mod: PBBFAC | Performed by: INTERNAL MEDICINE

## 2024-03-31 PROCEDURE — 99490 CHRNC CARE MGMT STAFF 1ST 20: CPT | Mod: S$PBB,,, | Performed by: INTERNAL MEDICINE

## 2024-04-01 ENCOUNTER — OFFICE VISIT (OUTPATIENT)
Dept: INTERNAL MEDICINE | Facility: CLINIC | Age: 59
End: 2024-04-01
Payer: MEDICARE

## 2024-04-01 VITALS
HEART RATE: 72 BPM | WEIGHT: 293 LBS | BODY MASS INDEX: 50.02 KG/M2 | HEIGHT: 64 IN | DIASTOLIC BLOOD PRESSURE: 80 MMHG | SYSTOLIC BLOOD PRESSURE: 122 MMHG | OXYGEN SATURATION: 98 %

## 2024-04-01 DIAGNOSIS — E23.6 EMPTY SELLA: ICD-10-CM

## 2024-04-01 DIAGNOSIS — E04.1 THYROID NODULE: ICD-10-CM

## 2024-04-01 DIAGNOSIS — Z12.31 SCREENING MAMMOGRAM, ENCOUNTER FOR: ICD-10-CM

## 2024-04-01 DIAGNOSIS — D70.9 NEUTROPENIA, UNSPECIFIED TYPE: ICD-10-CM

## 2024-04-01 DIAGNOSIS — I10 ESSENTIAL HYPERTENSION: Primary | ICD-10-CM

## 2024-04-01 DIAGNOSIS — Z86.73 HISTORY OF CVA (CEREBROVASCULAR ACCIDENT): ICD-10-CM

## 2024-04-01 DIAGNOSIS — I48.0 PAROXYSMAL ATRIAL FIBRILLATION: ICD-10-CM

## 2024-04-01 DIAGNOSIS — R73.03 PREDIABETES: ICD-10-CM

## 2024-04-01 DIAGNOSIS — D51.9 ANEMIA DUE TO VITAMIN B12 DEFICIENCY, UNSPECIFIED B12 DEFICIENCY TYPE: ICD-10-CM

## 2024-04-01 DIAGNOSIS — E55.9 VITAMIN D DEFICIENCY: ICD-10-CM

## 2024-04-01 DIAGNOSIS — E78.2 MIXED HYPERLIPIDEMIA: ICD-10-CM

## 2024-04-01 DIAGNOSIS — F33.1 DEPRESSION, MAJOR, RECURRENT, MODERATE: ICD-10-CM

## 2024-04-01 DIAGNOSIS — I70.0 AORTIC ATHEROSCLEROSIS: ICD-10-CM

## 2024-04-01 PROCEDURE — 99215 OFFICE O/P EST HI 40 MIN: CPT | Mod: PBBFAC | Performed by: INTERNAL MEDICINE

## 2024-04-01 PROCEDURE — 99999 PR PBB SHADOW E&M-EST. PATIENT-LVL V: CPT | Mod: PBBFAC,,, | Performed by: INTERNAL MEDICINE

## 2024-04-01 PROCEDURE — 99214 OFFICE O/P EST MOD 30 MIN: CPT | Mod: S$PBB,,, | Performed by: INTERNAL MEDICINE

## 2024-04-01 RX ORDER — AMLODIPINE BESYLATE 5 MG/1
5 TABLET ORAL DAILY
Qty: 90 TABLET | Refills: 3 | Status: SHIPPED | OUTPATIENT
Start: 2024-04-01 | End: 2025-04-01

## 2024-04-01 RX ORDER — ROSUVASTATIN CALCIUM 40 MG/1
40 TABLET, COATED ORAL NIGHTLY
Qty: 90 TABLET | Refills: 3 | Status: SHIPPED | OUTPATIENT
Start: 2024-04-01

## 2024-04-01 RX ORDER — LOSARTAN POTASSIUM 100 MG/1
100 TABLET ORAL DAILY
Qty: 90 TABLET | Refills: 3 | Status: SHIPPED | OUTPATIENT
Start: 2024-04-01 | End: 2025-04-01

## 2024-04-01 NOTE — PROGRESS NOTES
INTERNAL MEDICINE ESTABLISHED PATIENT VISIT NOTE    Subjective:     Chief Complaint: Hospital Follow Up       Patient ID: Amna Chawla is a 58 y.o. female with hx CVA and TIA c residual cognitive deficits (most recently c TIA 9/2018 per pt, has mild B weakness from several strokes in the past), carotid a disease, HTN, paroxysmal A fib c LAE, hx sudden cardiac arrest c VF and no ischemic heart disease and preserved EF (2013), intermittent 3rd deg AV block and symptomatic LVEF of 40% in setting of normal PET stress and chronic RV pacing s/p CRT-D, HLD, thyroid nodule s/p FNA 7/2018 c path c/w benign follicular nodule, depression with anxiety followed by psych, chronic complex h/a c occipital neuralgia followed by Neuro and on mult meds and has also had tx c botox, GERD c hiatal hernia, B carpal tunnel s/p release B, hx L middle ring finger trigger finger s/p release, SHIRA on APAP 6-20cm followed in sleep clinic, insomnia, prediabetes, last seen by me in Oct, here today for hospital f/u.    Weight fluctuating but down overall since last visit.    Still seeing Dr. Cortez for chronic migraines, last seen in Jan and still getting Botox.    Was seen by Ortho in early March for persistent issues c her foot and tx'ed c a Medrol dose mark but states that did not help.    Had routine Cards f/u c EP NP two weeks ago who noted she was doing well, plan to f/u in 6 mos.    Had ED visit on 3/24 for abd pain c urinary freq and noted to have UTI.  Rx'ed macrobid and Pyridium and states she is feeling better overall.    Past Medical History:  Past Medical History:   Diagnosis Date    Anticoagulant long-term use     Anxiety     Asthma     Atrial fibrillation     Brain anoxic injury     Cervicalgia 8/28/2014    CHI (closed head injury) 2/19/2013    Convulsion 5/30/2015    Decreased ROM of left shoulder 4/12/2017    Defibrillator activation 2013    Depression     Heart block     History of sudden cardiac arrest 2/2013    PEA arrest with  subsequent long-QT    Hx of psychiatric care     Hypertension     Left atrial enlargement 4/11/2018    Pacemaker 2013    Paresthesia 11/1/2013    Prolonged Q-T interval on ECG 2/8/2013    Psychiatric problem     Seizures     Sleep difficulties     Stroke     weakness lt side    Therapy     Thyroid disease     Upper airway resistance syndrome 2/21/2017       Home Medications:  Prior to Admission medications    Medication Sig Start Date End Date Taking? Authorizing Provider   amLODIPine (NORVASC) 5 MG tablet Take 1 tablet (5 mg total) by mouth once daily. 4/19/23 4/18/24 Yes Tiffany Mina MD   ARIPiprazole (ABILIFY) 15 MG Tab Take 1 tablet (15 mg total) by mouth once daily. 6/15/23  Yes Kade Huffman III, NP   blood sugar diagnostic Strp 1 strip by Misc.(Non-Drug; Combo Route) route 2 (two) times daily. 5/20/19  Yes Tiffany Mina MD   blood-glucose meter kit Please provide with insurance covered meter 5/20/19  Yes Tiffany Mina MD   carvediloL (COREG) 25 MG tablet Take 2 tablets (50 mg total) by mouth 2 (two) times daily with meals. 12/8/23  Yes Mariel Tomlinson NP   clonazePAM (KLONOPIN) 1 MG tablet TAKE 1 TABLET BY MOUTH DAILY AS NEEDED FOR ANXIETY 6/15/23  Yes Kade Huffman III, NP   cyanocobalamin, vitamin B-12, 1,000 mcg Subl Place 1,000 mcg under the tongue once daily. 9/20/23  Yes Dasha Osorio PA-C   diclofenac (VOLTAREN) 50 MG EC tablet Take 1 tablet (50 mg total) by mouth 3 (three) times daily as needed (migraine). 1/4/24  Yes David Cortez MD   diclofenac sodium (VOLTAREN) 1 % Gel Apply 2 g topically 4 (four) times daily. 9/20/23  Yes Dasha Osorio PA-C   donepezil (ARICEPT) 10 MG tablet Take 1 tablet (10 mg total) by mouth 2 (two) times daily. 7/18/17  Yes Toby Paredes MD   etodolac (LODINE) 500 MG tablet Take 1 tablet (500 mg total) by mouth 2 (two) times daily as needed (migraine). 11/16/23  Yes David Cortez MD   flurbiprofen (ANSAID) 100 MG tablet Take 1 tablet (100 mg  total) by mouth 2 (two) times daily as needed (migraine). 2/24/23  Yes David Cortez MD   fremanezumab-vfrm (AJOVY) 225 mg/1.5 mL injection Inject 1.5 mLs (225 mg total) into the skin every 28 days. 2/17/22  Yes David Cortez MD   furosemide (LASIX) 20 MG tablet Take 1 tablet (20 mg total) by mouth every other day. 5/10/23 5/9/24 Yes Tiffany Mina MD   gabapentin (NEURONTIN) 300 MG capsule Take 1 capsule (300 mg total) by mouth 3 (three) times daily. 2/2/23  Yes Hugo Borden MD   hydrOXYzine HCL (ATARAX) 25 MG tablet Take 1 tablet (25 mg total) by mouth 3 (three) times daily as needed for Itching. 7/13/22  Yes Laina Melvin FNP   lancets Misc 1 Device by Misc.(Non-Drug; Combo Route) route 2 (two) times daily. 5/20/19  Yes Tiffany Mina MD   losartan (COZAAR) 100 MG tablet Take 1 tablet (100 mg total) by mouth once daily. 4/19/23 4/18/24 Yes Tiffany Mina MD   magnesium 200 mg Tab Take 200 mg by mouth once daily. 3/7/18  Yes David Cortez MD   omeprazole (PRILOSEC) 40 MG capsule Take 1 capsule (40 mg total) by mouth once daily. Take 30 minutes before breakfast 12/8/23 12/7/24 Yes Mariel Tomlinson NP   ondansetron (ZOFRAN-ODT) 4 MG TbDL DISSOLVE 1 TABLET(4 MG) ON THE TONGUE EVERY 12 HOURS AS NEEDED FOR NAUSEA 12/8/23  Yes Mariel Tomlinson NP   polyethylene glycol (GLYCOLAX) 17 gram PwPk Take 17 g by mouth once daily. 7/6/23  Yes David Cortez MD   rosuvastatin (CRESTOR) 40 MG Tab TAKE 1 TABLET(40 MG) BY MOUTH EVERY EVENING 5/16/23  Yes Tiffany Mina MD   sertraline (ZOLOFT) 100 MG tablet Take 2 tablets (200 mg total) by mouth once daily. 6/15/23  Yes Kade Huffman III, NP   suvorexant (BELSOMRA) 10 mg Tab Take 1 tablet by mouth nightly as needed (insomnia). 7/6/23  Yes David Cortez MD   tiaGABine (GABITRIL) 4 MG tablet Take 1 tablet (4 mg total) by mouth every evening. 8/3/20  Yes David Cortez MD   tiZANidine (ZANAFLEX) 4 MG tablet Take 1 tablet (4 mg total) by mouth 3  (three) times daily as needed (muscle pain). 12/12/23  Yes David Cortez MD   TRUE METRIX GLUCOSE METER Misc TEST BG BID 5/30/19  Yes Provider, Obdulia   ubrogepant (UBROGEPANT) 100 mg tablet Take 1 tablet (100 mg total) by mouth 2 (two) times daily as needed for Migraine. 6/22/21  Yes David Cortez MD   XARELTO 20 mg Tab TAKE 1 TABLET BY MOUTH DAILY EVENING MEAL WITH DINNER 11/6/23  Yes Avelino Mejia MD   cetirizine (ZYRTEC) 10 MG tablet Take 1 tablet (10 mg total) by mouth once daily. for 7 days 7/13/22 3/18/24  Laina Melvin FNP   EPINEPHrine (EPIPEN) 0.3 mg/0.3 mL AtIn Inject 0.3 mLs (0.3 mg total) into the muscle once. for 1 dose 12/2/21 10/10/23  Yahaira Santos PA-C   flurbiprofen (ANSAID) 100 MG tablet Take 1 tablet (100 mg total) by mouth 2 (two) times daily as needed (migraine).  Patient not taking: Reported on 4/1/2024 2/24/23   David Cortez MD   methylPREDNISolone (MEDROL, ROMY,) 4 mg tablet Take as instructed on package  Patient not taking: Reported on 4/1/2024 3/1/24   Jamil Cowan MD   metFORMIN (GLUCOPHAGE) 500 MG tablet Take 1 tablet (500 mg total) by mouth 2 (two) times daily with meals. 4/19/22 6/2/22  Tiffany Mina MD       Allergies:  Review of patient's allergies indicates:   Allergen Reactions    Aspirin Hives    Imitrex [sumatriptan] Palpitations    Penicillins Hives and Swelling    Shellfish containing products Anaphylaxis     seafood    Reglan [metoclopramide hcl] Other (See Comments)     Parkinsonism        Social History:  Social History     Tobacco Use    Smoking status: Never     Passive exposure: Never    Smokeless tobacco: Never   Substance Use Topics    Alcohol use: Yes     Comment: wine, socially    Drug use: No        Review of Systems   Constitutional:  Negative for appetite change, chills, fatigue, fever and unexpected weight change.   HENT:  Negative for congestion, hearing loss and rhinorrhea.    Eyes:  Negative for pain and visual disturbance.  "  Respiratory:  Negative for cough, chest tightness, shortness of breath and wheezing.    Cardiovascular:  Negative for chest pain, palpitations and leg swelling.   Gastrointestinal:  Negative for abdominal distention and abdominal pain.   Endocrine: Negative for polydipsia and polyuria.   Genitourinary:  Negative for decreased urine volume, difficulty urinating, dysuria, hematuria and vaginal discharge.   Musculoskeletal:  Positive for arthralgias and back pain.   Neurological:  Positive for headaches. Negative for weakness and numbness.   Psychiatric/Behavioral:  Negative for behavioral problems and confusion.          Health Maintenance:     Immunizations:   Influenza 9/2023  TDap 10/2/2018  Pneumovax 9/2018, Prevnar 20 10/2023  Shingrix 10/2019, 3/2020 completed.  Moderna COVID 2/2021, 3/2021, 1/2022, updated booster rec this Fall, declined today as she states she had side effects with previous COVID boosters and is too busy at this time     Cancer Screening:  PAP: 9/2019 c Dr. Cara randalll, rec f/u  Mammogram:  6/2023 benign, will schedule repeat for June.  Colonoscopy:  5/2019, polyps x2 removed, tubular adenoma dn tubulovillous adenoma on path c rec repeat in 3 yrs per report.    Repeat done 6/2022 c polyp x1, path c/w hyperplastic polyp, rec f/u in 5 yrs.    Objective:   /80 (BP Location: Left arm, Patient Position: Sitting, BP Method: Large (Manual))   Pulse 72   Ht 5' 4" (1.626 m)   Wt 135 kg (297 lb 9.9 oz)   LMP 01/03/2016   SpO2 98%   BMI 51.09 kg/m²        General: AAO x3, no apparent distress  HEENT: no cervical LAD, no thyroid masses appreciated  CV: RRR, no m/r/g  Pulm: Lungs CTAB, no crackles, no wheezes  Abd: s/NT/ND +BS  Extremities: no c/c/e    Labs:     Lab Results   Component Value Date    WBC 3.37 (L) 04/02/2024    HGB 10.1 (L) 04/02/2024    HCT 29.9 (L) 04/02/2024    MCV 90 04/02/2024     04/02/2024     Sodium   Date Value Ref Range Status   04/02/2024 138 136 - 145 mmol/L " Final     Potassium   Date Value Ref Range Status   04/02/2024 3.4 (L) 3.5 - 5.1 mmol/L Final     Chloride   Date Value Ref Range Status   04/02/2024 106 95 - 110 mmol/L Final     CO2   Date Value Ref Range Status   04/02/2024 26 23 - 29 mmol/L Final     Glucose   Date Value Ref Range Status   04/02/2024 103 70 - 110 mg/dL Final     BUN   Date Value Ref Range Status   04/02/2024 12 6 - 20 mg/dL Final     Creatinine   Date Value Ref Range Status   04/02/2024 0.9 0.5 - 1.4 mg/dL Final     Calcium   Date Value Ref Range Status   04/02/2024 8.7 8.7 - 10.5 mg/dL Final     Total Protein   Date Value Ref Range Status   04/02/2024 9.3 (H) 6.0 - 8.4 g/dL Final     Albumin   Date Value Ref Range Status   04/02/2024 2.8 (L) 3.5 - 5.2 g/dL Final     Total Bilirubin   Date Value Ref Range Status   04/02/2024 0.2 0.1 - 1.0 mg/dL Final     Comment:     For infants and newborns, interpretation of results should be based  on gestational age, weight and in agreement with clinical  observations.    Premature Infant recommended reference ranges:  Up to 24 hours.............<8.0 mg/dL  Up to 48 hours............<12.0 mg/dL  3-5 days..................<15.0 mg/dL  6-29 days.................<15.0 mg/dL       Alkaline Phosphatase   Date Value Ref Range Status   04/02/2024 59 55 - 135 U/L Final     AST   Date Value Ref Range Status   04/02/2024 10 10 - 40 U/L Final     ALT   Date Value Ref Range Status   04/02/2024 13 10 - 44 U/L Final     Anion Gap   Date Value Ref Range Status   04/02/2024 6 (L) 8 - 16 mmol/L Final     eGFR if    Date Value Ref Range Status   05/17/2022 >60.0 >60 mL/min/1.73 m^2 Final     eGFR if non    Date Value Ref Range Status   05/17/2022 >60.0 >60 mL/min/1.73 m^2 Final     Comment:     Calculation used to obtain the estimated glomerular filtration  rate (eGFR) is the CKD-EPI equation.        Lab Results   Component Value Date    HGBA1C 6.3 (H) 04/02/2024     Lab Results   Component  Value Date    LDLCALC 108.4 04/02/2024     Lab Results   Component Value Date    TSH 2.264 04/02/2024         Assessment/Plan     Amna was seen today for hospital follow up.    Diagnoses and all orders for this visit:    Essential hypertension  at goal.  Cont current medications.  -     Comprehensive Metabolic Panel; Future  -     losartan (COZAAR) 100 MG tablet; Take 1 tablet (100 mg total) by mouth once daily.  -     amLODIPine (NORVASC) 5 MG tablet; Take 1 tablet (5 mg total) by mouth once daily.    Mixed hyperlipidemia  LDL 52 on last check 5/2023  Goal LDL<70 based on hx CVA  Repeat labs now, cont Crestor       Lipid Panel; Future  -     rosuvastatin (CRESTOR) 40 MG Tab; Take 1 tablet (40 mg total) by mouth every evening.    Prediabetes  Close to baseline at 6.2 on last check last May  Needs updated labs.  -     Hemoglobin A1C; Future    History of CVA (cerebrovascular accident)  Aortic atherosclerosis  No acute issues, cont bp and lipid control as above    Paroxysmal atrial fibrillation  Stable, cont mgmt as per Cardiology    Depression, major, recurrent, moderate  Stable on meds as per psych, cont f/u c Kade Nathanael    Neutropenia, unspecified type  Stable on last check, will monitor.  -     CBC Auto Differential; Future    Anemia due to vitamin B12 deficiency, unspecified B12 deficiency type  Stable on last check, will monitor  -     VITAMIN B12; Future  -     CBC Auto Differential; Future    Vitamin D deficiency  On supplement, will cont  -     Vitamin D; Future    Thyroid nodule  -     TSH; Future    Screening mammogram, encounter for  -     Mammo Digital Screening Bilat w/ Surendra; Future    Empty sella  No issues      HM as above  RTC in 6 mos, sooner if needed.  Fasting labs tomorrow.    Tiffany Mina MD  Department of Internal Medicine - [facility]  04/02/2024

## 2024-04-02 ENCOUNTER — OFFICE VISIT (OUTPATIENT)
Dept: ORTHOPEDICS | Facility: CLINIC | Age: 59
End: 2024-04-02
Payer: MEDICARE

## 2024-04-02 VITALS — BODY MASS INDEX: 50.02 KG/M2 | WEIGHT: 293 LBS | HEIGHT: 64 IN

## 2024-04-02 DIAGNOSIS — M79.671 RIGHT FOOT PAIN: Primary | ICD-10-CM

## 2024-04-02 DIAGNOSIS — M76.71 PERONEAL TENDINITIS OF RIGHT LOWER EXTREMITY: ICD-10-CM

## 2024-04-02 PROCEDURE — 99999 PR PBB SHADOW E&M-EST. PATIENT-LVL IV: CPT | Mod: PBBFAC,,, | Performed by: ORTHOPAEDIC SURGERY

## 2024-04-02 PROCEDURE — 99214 OFFICE O/P EST MOD 30 MIN: CPT | Mod: PBBFAC | Performed by: ORTHOPAEDIC SURGERY

## 2024-04-02 PROCEDURE — 99213 OFFICE O/P EST LOW 20 MIN: CPT | Mod: S$PBB,,, | Performed by: ORTHOPAEDIC SURGERY

## 2024-04-02 NOTE — PROGRESS NOTES
Amna Chawla  Returns today for follow-up.  This is a 58-year-old female with multiple medical issues including cardiac issues and has a pacemaker and developed insidious pain in the lateral aspect of her right foot about three months prior to her initial visit with me on 03/01/2024.  She did not report any history of trauma or any activity changes prior to the onset of her symptoms.  Plain x-rays that had been done in November 2023 revealed some flattening of her longitudinal arches but no abnormalities around the base of the 5th metatarsal to explain her pain.  I suspected that she might have insertional peroneal tendinitis and I recommended a four week period of boot immobilization as well as a Medrol Dosepak.  She returns today and reports that her pain has not improved over the last several weeks with boot immobilization.    Examination:  The right foot reveals no significant swelling this morning but she does have continued tenderness over the base of the 5th metatarsal.  She does not report any significant pain with resistance to eversion of her hindfoot.  She is neurovascularly intact.    Impression:  1. Right foot pain  CT Foot Without Contrast Right      2. Peroneal tendinitis of right lower extremity  CT Foot Without Contrast Right            Recommendation:  She is having continued non improving pain despite boot immobilization and protected weight-bearing.  Ideally, I would like to obtain an MRI scan but her pace maker is not MRI compatible.  I am going to order a CT scan to rule out any occult fractures of the base of the 5th metatarsal.  If there are no obvious abnormalities on CT scan, then I would recommend a course of physical therapy.    Follow-up with the results of CT scan

## 2024-04-07 NOTE — TELEPHONE ENCOUNTER
----- Message from Kori Malone MA sent at 3/7/2022  8:10 AM CST -----  The patient need to talk to the nurse about her nose bleed and her medication  Xarelto please call [phone]. Thank you.     FOCUS: MOM DISCHARGE    D: DISCHARGE ORDER RECEIVED FROM MD    A: POSTPARTUM DISCHARGE FOLDER GIVEN, DISCHARGE MEDICATION FORM REVIEWED, SIGNED AND GIVEN TO PATIENT.  ID BAND MATCHED WITH INFANTS BANDS, WHO ARE STILL ADMITTED TO [facility]. PATIENT INFORMED WHEN TO MAKE FOLLOW UP APPOINTMENT WITH OB    R: MOTHER IS INTERACTING APPROPRIATELY WITH INFANTS.  VERBALIZED UNDERSTANDING OF FOLLOW UP INSTRUCTIONS.  DISCHARGED IN STABLE VIE WHEELCHAIR.

## 2024-04-09 ENCOUNTER — CLINICAL SUPPORT (OUTPATIENT)
Dept: CARDIOLOGY | Facility: HOSPITAL | Age: 59
End: 2024-04-09
Attending: INTERNAL MEDICINE
Payer: MEDICARE

## 2024-04-09 DIAGNOSIS — Z95.810 PRESENCE OF AUTOMATIC (IMPLANTABLE) CARDIAC DEFIBRILLATOR: ICD-10-CM

## 2024-04-09 PROCEDURE — 93295 DEV INTERROG REMOTE 1/2/MLT: CPT | Mod: ,,, | Performed by: INTERNAL MEDICINE

## 2024-04-17 LAB
OHS CV AF BURDEN PERCENT: < 1
OHS CV BIV PACING PERCENT: 94 %
OHS CV DC REMOTE DEVICE TYPE: NORMAL
OHS CV RV PACING PERCENT: 92 %

## 2024-04-18 ENCOUNTER — TELEPHONE (OUTPATIENT)
Dept: OTOLARYNGOLOGY | Facility: CLINIC | Age: 59
End: 2024-04-18
Payer: MEDICARE

## 2024-04-18 NOTE — TELEPHONE ENCOUNTER
LM on patient's VM that I had to cancel her upcoming appointment as Dr. Hall is no longer seeing new patients for the foreseeable future, gave her the phone number to call [facility] to schedule over there, very sorry for any inconvenience.

## 2024-04-30 ENCOUNTER — EXTERNAL CHRONIC CARE MANAGEMENT (OUTPATIENT)
Dept: PRIMARY CARE CLINIC | Facility: CLINIC | Age: 59
End: 2024-04-30
Payer: MEDICARE

## 2024-04-30 PROCEDURE — 99490 CHRNC CARE MGMT STAFF 1ST 20: CPT | Mod: S$PBB,,, | Performed by: INTERNAL MEDICINE

## 2024-04-30 PROCEDURE — 99490 CHRNC CARE MGMT STAFF 1ST 20: CPT | Mod: PBBFAC | Performed by: INTERNAL MEDICINE

## 2024-05-01 ENCOUNTER — PROCEDURE VISIT (OUTPATIENT)
Dept: NEUROLOGY | Facility: CLINIC | Age: 59
End: 2024-05-01
Payer: MEDICARE

## 2024-05-01 VITALS
HEIGHT: 64 IN | SYSTOLIC BLOOD PRESSURE: 121 MMHG | HEART RATE: 65 BPM | WEIGHT: 293 LBS | DIASTOLIC BLOOD PRESSURE: 75 MMHG | BODY MASS INDEX: 50.02 KG/M2

## 2024-05-01 DIAGNOSIS — G43.711 CHRONIC MIGRAINE WITHOUT AURA, WITH INTRACTABLE MIGRAINE, SO STATED, WITH STATUS MIGRAINOSUS: Primary | ICD-10-CM

## 2024-05-01 DIAGNOSIS — T61.91XA ALLERGIC REACTION TO SEAFOOD: ICD-10-CM

## 2024-05-01 DIAGNOSIS — E53.8 B12 DEFICIENCY: ICD-10-CM

## 2024-05-01 PROCEDURE — 64615 CHEMODENERV MUSC MIGRAINE: CPT | Mod: PBBFAC | Performed by: PSYCHIATRY & NEUROLOGY

## 2024-05-01 PROCEDURE — 64615 CHEMODENERV MUSC MIGRAINE: CPT | Mod: S$GLB,,, | Performed by: PSYCHIATRY & NEUROLOGY

## 2024-05-01 RX ORDER — EPINEPHRINE 0.3 MG/.3ML
1 INJECTION SUBCUTANEOUS ONCE
Qty: 0.3 ML | Refills: 1 | Status: SHIPPED | OUTPATIENT
Start: 2024-05-01 | End: 2024-05-01

## 2024-05-01 RX ORDER — PNV NO.95/FERROUS FUM/FOLIC AC 28MG-0.8MG
100 TABLET ORAL
Status: SHIPPED | OUTPATIENT
Start: 2024-05-01

## 2024-05-01 NOTE — PROCEDURES
Follow up:  Migraines are stable     Prior note:   Reports worsening LBP after COVID in Jan   On antibiotics for bronchitis     Prior note:   Reports nausea   Knees buckle frequently   No benefit from new shoes   Not Using cane     Prior note:   Reports overall worse HA   Feels tremors in whole body lasting 3-4 hrs   Tried ativan - helped for a few hours     Prior note:   She is grappling with grief of loss of her sister   Pending grief counseling      Prior note:   S/p course of prednisone for GI allergic reaction (scratch throat - seafood related)     Prior note:   Migraine Hz increased during storm - almost daily   One episode of lightheadedness. No SOB, CP       Prior note:   Reports episode of lightheadedness 2 days ago      Prior note:   S/p COVID vaccine      Prior note:   Reports more physical fatigue   taking 2 naps a day   L sided severe migraine lasted 2 days. Took ubrelvy, motrin, zanaflex      Prior note:   3 days of severe L sided HA + nausea   Gradual onset   Ubrelvy, zanaflex, flurbiprofen - not helping   Vision - nml      Prior note:   HA stable   Try Ubrelvy again      Prior note:   independent left and right parietal ice prick HA      Prior note:   HA much improved   Only 2 since last visit      Prior note:   HA are more frequent   Taking 1600 mg motrin + zanaflex   Went to ER recently      Prior note:   HA overall stable in Hz and intensity   Reports a wearing off effect of BOTOX since last week   Taking zanaflex 8 mg TID        Prior note:   HA started 12/24   She feels BOTOX wore off last week   Reports GI distress from taking to many motrin      Prior note:   4/week HA - L vertex   Jabs - vertex either sides      She reports migraine that woke her up in the morning - associated with L side facial droop      Prior note:   She gets acceptable relief for 2.5 months after BOTOX      Prior note:   No recurrent stroke symptoms   starting having whole body tremors since this AM. Took 1 tablet of  lorazepam   Last night had a HA, took trazodone       Admit Date: 4/11/2018  2:03 PM   Discharge Date and Time: 4/12/2018  8:30 PM   History of Present Illness: Ms. Chawla is a 53 yo F with afib on Eliquis (last dose this am), prior cardiac arrest with associated acute ischemic stroke with residual mild L sided weakness, CAD with ICD in situ, HTN, and HLD who presented with acute R sided weakness and sensation changes.  She originally called EMS for palpitations, but developed acute right sided facial droop and RUE weakness at 1:40pm today, after EMS had arrived.  She denies changes to speech or vision.  SBP en route 200/100.  She is ineligible for tPA 2/2 Eliquis use.  She is not suspected to have an LVO.  She will be admitted to [facility] for observation overnight.     Hospital Course (synopsis of major diagnoses, care, treatment, and services provided during the course of the hospital stay): Ms. Chawla was admitted for acute stroke workup. Pt with pacemaker so unable to obtain MRI Brain; CTA H/N demonstrated no evidence acute infarction, though did exhibit noncalcified plaquing of carotid bifurcations and proximal ICAs with < 50% stenosis, so Plavix was added to pt's daily home regimen. Concerned for TIA at this time though Migraine very high on differential due to pt's hx headaches, including presently this admission. Hypertensive urgency/emergency also considered due to pt's very elevated levels with EMS. Per chart review, Eliquis was a new medication since 4/3/18 (had switched from Coumadin), however staff Faraz concerned that pt had a potential event while on Eliquis. She wished to switch to Pradaxa as an alternative DOAC (as it has an option for reversal), however changed to Xarelto per pt's insurance coverage.   While admitted, pt given cocktail for HA which she has received previously at the infusion clinic - IV Magnesium, IM Toradol, 2mg IV Morphine, 500cc NS bolus (IV Benadryl not included this time as pt  had received a dose earlier that day prior to contrast imaging.) HA improved somewhat and pt was encouraged to follow up with Dr. Cortez for continued management of botox injections, etc. At that time, pt also found with hyponatremia; d/c'd Clorthalidone and plan was made for pt to [facility] 4[facility]16[facility]18 for repeat CMP to evaluate Na level and BP check since discontinuing this medication.  Ms. Chawla was evaluated and treated by PT, OT, and SLP who recommend d/c with outpatient PT and OT. She was discharged home 4/12/18 with [facility] follow-up Monday      Prior note:   2 weeks ago BOTOX effect wore off   Used up all her percocet   3 wks h/o:   Reports frequent falls - falling forward, no LOC, no injuries, able to protect falls by hands   triggers - unknown   Also occ stumbling   Dragging L foot   No Pain in back or legs   No numbness or weakness in legs   No B/B incontinence   No lightheadedness      Prior note:    Flare up of TMJ and HA during daughter's wedding   NSAIDS helped   2 weeks ago BOTOX effect wore off      Prior note:   2 weeks ago BOTOX effect wore off   Has severe spasm and pain in the traps catalina   Weather change has provoked severe migraine + nausea   She started coumadin       Prior note:   3 weeks ago BOTOX effect wore off   She reports severe daily HA    During BOTOX periods she feels she  her HA. Much less intense      Prior note:   The patient is a 51-year-old female who [facility]. Since her last visit, she reports growing frustration   about the fact that nothing has helped her with regards to her headaches. She   continues to experience daily headaches. She takes mefenamic acid and   tizanidine every night, which only provides her two hours of relief. She is   unable to sleep because of the refractory headaches. She is incapacitated   because of severe headaches. She reports minimal relief with infusions  "that she  gets on a periodic basis. She does report an improvement overall with the   Botox. However, this effect has worn off in the last two weeks. She also   reports an episode wherein she fell with loss of consciousness, which was not   provoked. As a result, she injured her ankle and is currently wearing a boot.      Prior note:   since last week - Reports vertigo for 6 - 7 minutes upto 3 times daily   Nearly daily severe HA - no response to ponstel , compazine   She is late for her BOTOX - last 2 weeks were painful       Follow up:   R sided Headache is constant max at TMJ on R   Prior note:   She reports new episodes of R face tingling. Sh also reports 2 episodes of blurred vision lasting 15 minutes. These hapended while watching TV. Her pain remains unchanged   Follow up:   2 days of severe HA during Thanksgiving   Pounding bifrontal region   Pain worse over L eye brow   Follow up:   Reports bifrontal severe HA (worse on L) with no nausea with sudden onset . Occurs daily   NO note:  I found no papilledema or other changes to suggest elevated intracranial pressure or other alternative etiology for her headaches. Repeat exam in two years.  HA are less frequent and less intense.   (R levator scapula spasm)   symptoms better with lateral flexion to L   Prior note:   She still has daily HA with severe sharp stabbing pain behind L eye lasting for 15 sec   Prior note:   Daily pain. 2/week - nausea.  Shu Lares RN at 9/17/2014 4:53 PM  Pt presented to clinic for medical advice. Pt is seen by Dr. Paredes and Dr. Cortez.  1) She went ER on 9/13/14 for a migraine. She was given Reglan, Benadryl, MSO4 and IVF. A couple days later she developed an all over body "tremor". She states "I think I have Parkinson's". - Per Dr. Paredes, medication related tremor (Reglan & Benadryl). Informed her it should wear off in a few days. If not, she is to call and let us know.  2) Headaches - triggered by insomnia.   Current " "meds:  Ketoprofen - she took it once and said her "throat closed up" and she's not supposed to have anything with aspirin in it.  Nortriptyline - she was taking it at night, but it didn't help her sleep, so she stopped it.  Per Dr. Cortez, discontinue Ketoprofen and Nortriptyline. Start Effexor XR 37.5mg QD, tizanidine 4mg BID PRN headache and Klonopin 0.25 - 0.5mg QHS PRN. Will schedule pt for sphenocath procedure within the next week.   Prior note:   48 yo woman with history of HTN and asthma, both reportedly controlled, in hospital early 2013 after "passed out" and became unresponsive. CPR in the field for 8 minutes until EMS arrived. Per ED note, on arrival she was found to be in PEA, given epinephrine. Vfib/Vtach was achieved which was shocked x1. Patient converted to sinus tachycardia after shock. I do not know the time course for the above treatment. On arrival to facility patient was in sinus tachycardia with strong pulses, intubated and unresponsive. ET tube placement was confirmed with direct laryngoscopy and good bilateral breath sounds were heard after the tube was pulled back 2 cm. Reported to have "withdrawal" movements in ER during stabilization, then sedated and cooling protocol initiated. Had remarkable   recovery and first seen in clinic in April 2013.   Headache history: cluster   Age of onset - 2012   Location - behind L eye   Nature of pain - sharp   Prodrome - no   Aura - No   Duration of headache - 6-7 min   Time to peak intensity -   Pain scale - range of intensity - 9/10   Frequency - daily   Status Migrainosus history - 1-3 days   Time of day of most headaches- anytime   Associated symptoms with the headache:   Red eyes   swelling of L face   Headache history: Migraine   Age of onset - 2012   Location - bifrontal   Nature of pain - Pounding   Prodrome - no   Aura - No   Duration of headache - > 4 hrs   Time to peak intensity -   Pain scale - range of intensity - 9/10   Frequency - 5/week " "  Status Migrainosus history - 1-3 days   Time of day of most headaches- anytime   Associated symptoms with the headache:   Meningeal symptoms - photophobia, phonophobia, exercise intolerance +   Nausea/vomitting +   Nasal drainage   Visual blurriness +   Pallor/flushing   Dizziness   Vertigo   Confusion   Impaired concentration +   Pain worsened with physical activity +   Neck pain +   Symptoms of increased intracranial pressure:   Whooshing sounds - no   Visual spots/blotches - no   Headache Triggers: unknown   Other comorbid conditions:   Anxiety - no   Motion sickness symptom - no       Treatment history:   Resolution of headache with sleep - yes       Meds tried:   Trigger points involvin/9/15 helped for 1 day   Site: Right   Levator scapula   Pharmacological agent:   0.5% Sensorcaine solution   No complications were noted.  Supraorbital block - helped for 2 days   Tylenol - not help   imitrex - chest tightness   alleve - help   topamax - not helping   Neurontin - not helping   Paxil, Elavil - not help   seroquel - nightmare   Ketoprofen - she took it once and said her "throat closed up" and she's not supposed to have anything with aspirin in it.  Flurbiprofen - constipation   Nortriptyline - she was taking it at night, but it didn't help her sleep, so she stopped it.  reglan - parkinsonism   zanaflex - help   Toradol 30 mg IM inj at home - too expensive   BOTOX round #1 9/3/15 - helped (R levator scapula spasm)   Round #2 12/3/15 - helped   Round#3 3/316 - helped   Round #4 16 - helped   BOTOX#5 9/15/16 - helped     BOTOX#6 - 16 - helped   BOTOX#7 - 3/9/17 - helped    BOTOX#8 - 17 - helped    BOTOX#9 8/10/17 - helped   BOTOX#10 10/19/17 - helped   BOTOX#11 17 - helped   BOTOX#12 3/7/18 - helped   BOTOX#13 18 - helped    BOTOX#14 18 - helped     BOTOX#15 10/19/18 - helped      BOTOX#16 18 - helped   BOTOX#17 3/13/19 - helped    BOTOX#18 19 - helped     BOTOX#19 " 8/1/19 - helped      BOTOX#20 10/11/19 - helped     BOTOX#21 12/19/19 - helped   BOTOX#22 3/10/20 - helped    BOTOX#23 6/26/20 - helped   BOTOX#24 11/12/20 - helped  BOTOX#25 1/21/21 - helped   BOTOX#26 4/22/21 - helped   BOTOX#27 7/6/21 - helped    BOTOX#28 12/10/21 - helped     BOTOX#29 5/19/22 - helped   BOTOX#30 8/25/22 - helped  BOTOX#31 12/2/22 - helped  BOTOX#32 3/1/23 - helped  BOTOX#33 7/6/23 - helped  BOTOX#34 9/28/23 - helped  BOTOX#35 1/4/24 - helped    AIMOVIG (sept 1rst week, Oct first week, Nov first wk 140 mg, Dec first wk 140 mg, Jan, Feb) no benefit   Constipation +      Can not do RFA - pt has pacemaker   Procedure: IOVERA peripheral nerve focus cold therapy (non ablative cryotherapy) 12/30/15 - not help   Indication: Cryotherapy of select peripheral nerves of the head was performed to treat chronic headaches that failed to respond to several preventive pharmacological therapies.   Sedation: None   Informed consent: The procedure, risks, benefits and options were discussed with the patient. There are no contraindications to the procedure. The patient expressed understanding and agreed to the procedure. I verify that I personally obtained the patient's consent prior to the start of the procedure.  Location:  Bilateral Supra orbital nerve (3 cycles on R and 1 cycle on L)   Bilateral Occipital nerve   3 cycles   Pain relief: Pain score reduced 10/10->0/10   9/26 Bilateral Sphenopalatine Ganglion and bilateral Maxillary Branch (Trigeminal Nerve) Block - no help   ONB - not help     georges Pagan RN at 12/31/2014 3:54 PM                           Status: Signed                                       Expand All Collapse All   HEADACHE FASTTRACK  PAIN 7/10 NAUSEA 0/10  ROUND 1: TORADOL, IMITREX, MORPHINE, BENADRYL, AND MAGNESIUM  PAIN 7/10 NAUSEA 0/10  PT STATED SHE WAS READY TO GO HOME AND GO TO SLEEP. PT D/C'ED IN [facility]                              Shannon Pagan RN at 10/5/2015 12:49 PM                            Status: Signed                                       Expand All Collapse All   HEADACHE FASTTRACK  PAIN 8/10 NAUSEA 10/10  FIRST ROUND: tORADOL, BENADRYL, COMPAZINE, IMITREX, MAGNESIUM, AND VALPROATE.  PAIN 1/10 NAUSEA 0/10  PT READY TO GO HOME AND FEELING BETTER. PT LEFT IN NAD WITH FAMILY MEMBER  TOTAL TIME: 6116-0349                         Shannon Pagan RN at 10/20/2015 3:05 PM                           Status: Signed                                       Expand All Collapse All   HEADACHE FASTTRACK  PAIN 10/10 NAUSEA 0/10  FIRST ROUND: tORADOL, BENADRYL, COMPAZINE, IMITREX, MAGNESIUM, AND VALPROATE.  BP BEING MONITORED EVERY 15 MIN AND REMAINED 170'S/80-90'S. DR CHOPRA NOTIFIED AND STATED TO MAKE SURE BP DID NOT EXCEED 180 SYSTOLIC OR PT SHOULD REPORT TO ED. BP AT 1500 183/92. DR CHOPRA NOTIFIED AND GAVE ORDER TO STOP INFUSION AND SEND PATIENT TO ED. PT BROUGHT TO ED BY INFUSION NURSE VIA WHEELCHAIR.   PAIN 8/10 NAUSEA 0/10  TOTAL TIME: 4061-6142                                                     Progress Notes                                               Shannon Pagan RN at 2/24/2016 1:36 PM       Status: Signed                  Expand All Collapse All   HEADACHE FASTTRACK  PAIN 10/10 NAUSEA 7/10  FIRST ROUND: tORADOL, BENADRYL, AND MORPHINE-BP RANGING FROM 177-203/84-92, HR 60-82  MD NOTIFIED AND GAVE ORDERS TO SEND TO ED. PT LEFT VIA W/C WITH INFUSION NURSE ON WAY TO ED.            Headache risk factors:   H/o TBI - [facility] (2013) + neck sprain   H/o Meningitis - no   F/h Aneurysms - no       Review of Systems   Constitutional: Negative.   HENT: Negative.   Eyes: Negative.   Respiratory: Negative.   Cardiovascular: Negative.   Gastrointestinal: Negative.   Endocrine: Negative.   Genitourinary: Negative.   Musculoskeletal: Negative.   Skin: Negative.   Allergic/Immunologic: Negative.   Hematological: Negative.   Psychiatric/Behavioral: insomnia      Objective:     Physical Exam   Constitutional: She is  oriented to person, place, and time. She appears well-developed.   obesity   Psychiatric: She has a normal mood and flat affect. Her behavior is normal. Judgment and thought content normal.   Headache Exam:  Optic disc is flat OU   Temples appear symmetric  No V1,V2,V3 distribution allodynia/hyperalgesia catalina   No facial swelling  No gross TMJ abnormalities   No ptosis   No conj injection   No meningismus   No neck stiffness  No C spine tenderness  Cervical facet tenderness on palpation catalina   No tender points over trapezium   catalina supraorbital nerve exit point tenderness  catalina occipital nerve exit point tenderness  No auriculotemporal nerve tenderness   Tenderness of levator scapula catalina    Gait - wide based gait                       Assessment:                              1.  Occipital neuralgia                              2.  Cervicalgia                              3.  4  5  6 Chronic migraine without aura, with intractable migraine, so stated, with status migrainosus (post traumatic) post concussive?  Depression   TMJ - R sided   Insomnia - SHIRA      Head CT  Findings: There is no evidence of acute or recent major vascular territory cerebral infarction, parenchymal hemorrhage, or intra-axial mass.  There are no extra-axial masses or abnormal fluid collections.  The ventricular system is within normal limits of size for age and shows no distortion by mass-effect or evidence of hydrocephalus.  There is no fracture or destructive osseous lesion.  The extracranial soft tissues are unremarkable.  The visualized paranasal sinuses and mastoid air cells are clear.   Impression    No acute intracranial abnormality.  ______________________________________     Electronically signed by resident: KRISSY MAYERS MD  Date: 03/14/16  Time: 17:09            Plan:                              1. Prophylactic medication -   Despiramine 25 mg AM (for dizziness) PRN  zoloft 25 mg daily    Aricept 20 mg daily   Neurontin 900 mg TID     Magnesium 200 mg daily  Topamax 200 mg BID   Clonazepam BID for anxiety PRN  On trazodone   Cont B2, B6 supplements  On bellsomra    2, Breakthrough headache - zanaflex 8 mg Q8, etodolac - insurance will not pay for no clinical reason   Diclofenac   Multiple alternative treatment options were offered to the patient   3. Refrain from over counter pain medications. Discussed medication overuse headache.cont Magnesium 400 mg PO BID   4. Occipital neuralgia - If medical management is ineffective may consider occipital nerve blocks.   5. I again urged the patient to keep a headache calender.    f/up Dr. Rock for TBI   Vit D supplements    f/up sleep clinic - CPAP - waiting for new machine   Cont AJOVY (April 2019- ) - gap Feb-April 2021)  No side effects     Consider  ONB 2 weeks prior to next BOTOX       BOTOX treatment response:   Prior to initiating BOTOX, the patient had 28   migraine days per month on average. This meets criteria for chronic migraine.   After starting BOTOX, the patient experienced a reduction in  25  days per month   After starting BOTOX, the patient experienced a reduction in > 100 hours of migraine symptoms per month   After starting BOTOX, the patient experienced a 50% reduction in burden of migraine days per month   Based on this information, BOTOX is medically necessary for the management of the patient's chronic migraine.     BOTOX Injection intervals:   Patient reports a 'wearing off effect' prior to the subsequent BOTOX injections at 12 weeks. This occurs at week 9-10  Symptoms of this a 'wearing off effect' include - worsening of migraine headache frequency and intensity   Medications used during the 'wearing off effect' period include flurbiprofen, zanaflex  Based on this information, it is medically necessary for the patient to receive BOTOX therapy at an interval of every 10 weeks for the management of chronic migraine.    BOTOX was performed as an indicated therapy for intractable  chronic migraine headaches given that the patient failed > 5 prophylactic medications  Botulinum Toxin Injection Procedure   Pre-operative diagnosis: Chronic migraine   Post-operative diagnosis: Same   Procedure: Chemical neurolysis   After risks and benefits were explained including bleeding, infection, worsening of pain, damage to the areas being injected, weakness of muscles, loss of muscle control, dysphagia if injecting the head or neck, facial droop if injecting the facial area, painful injection, allergic or other reaction to the medications being injected, and the failure of the procedure to help the problem, a signed consent was obtained.   The patient was placed in a comfortable area and the sites to be treated were identified.The area to be treated was prepped three times with alcohol and the alcohol allowed to dry. Next, a 30 gauge needle was used to inject the medication in the area to be treated.   Area(s) injected:   Total Botox used: 155 Units   Botox wastage: 45 Units   Injection sites:     muscle bilaterally ( a total of 5 units divided into 2 sites)   Procerus muscle (5 units)   Frontalis muscle bilaterally (a total of 20 units divided into 4 sites)   Temporalis muscle bilaterally (a total of 50 units divided into 8 sites)   Occipitalis muscle bilaterally (a total of 30 units divided into 6 sites)   Cervical paraspinal muscles (a total of 20 units divided into 4 sites)   Trapezius muscle bilaterally (a total of 30 units divided into 6 sites)     Complications: none       RTC for the next Botox injection: 10 weeks    Procedures

## 2024-05-07 ENCOUNTER — TELEPHONE (OUTPATIENT)
Dept: CARDIOLOGY | Facility: HOSPITAL | Age: 59
End: 2024-05-07
Payer: MEDICARE

## 2024-05-07 NOTE — TELEPHONE ENCOUNTER
Patient last seen in device clinic on 3/18/24.  Has had low/fluctuating CRT pacing percentages in the past, likely due to PVCs.  History of CHB.    CRT now 92%, PVC burden 8%    Has recall for 9/2024.   Last EF: 50% on 3//2/2023    MD notified.

## 2024-05-28 ENCOUNTER — PATIENT MESSAGE (OUTPATIENT)
Dept: NEUROLOGY | Facility: CLINIC | Age: 59
End: 2024-05-28
Payer: MEDICARE

## 2024-05-31 ENCOUNTER — EXTERNAL CHRONIC CARE MANAGEMENT (OUTPATIENT)
Dept: PRIMARY CARE CLINIC | Facility: CLINIC | Age: 59
End: 2024-05-31
Payer: MEDICARE

## 2024-05-31 PROCEDURE — 99490 CHRNC CARE MGMT STAFF 1ST 20: CPT | Mod: S$GLB,,, | Performed by: INTERNAL MEDICINE

## 2024-06-03 ENCOUNTER — PATIENT MESSAGE (OUTPATIENT)
Dept: PSYCHIATRY | Facility: CLINIC | Age: 59
End: 2024-06-03
Payer: MEDICARE

## 2024-06-05 ENCOUNTER — E-VISIT (OUTPATIENT)
Dept: INTERNAL MEDICINE | Facility: CLINIC | Age: 59
End: 2024-06-05
Payer: MEDICARE

## 2024-06-05 ENCOUNTER — OFFICE VISIT (OUTPATIENT)
Dept: PSYCHIATRY | Facility: CLINIC | Age: 59
End: 2024-06-05
Payer: MEDICARE

## 2024-06-05 DIAGNOSIS — J18.9 ATYPICAL PNEUMONIA: ICD-10-CM

## 2024-06-05 DIAGNOSIS — R05.1 ACUTE COUGH: Primary | ICD-10-CM

## 2024-06-05 DIAGNOSIS — F33.1 DEPRESSION, MAJOR, RECURRENT, MODERATE: Primary | ICD-10-CM

## 2024-06-05 PROCEDURE — 99421 OL DIG E/M SVC 5-10 MIN: CPT | Mod: ,,, | Performed by: INTERNAL MEDICINE

## 2024-06-05 PROCEDURE — 3044F HG A1C LEVEL LT 7.0%: CPT | Mod: CPTII,95,, | Performed by: SOCIAL WORKER

## 2024-06-05 PROCEDURE — 4010F ACE/ARB THERAPY RXD/TAKEN: CPT | Mod: CPTII,95,, | Performed by: SOCIAL WORKER

## 2024-06-05 PROCEDURE — 90834 PSYTX W PT 45 MINUTES: CPT | Mod: 95,,, | Performed by: SOCIAL WORKER

## 2024-06-05 RX ORDER — CODEINE PHOSPHATE AND GUAIFENESIN 10; 100 MG/5ML; MG/5ML
5 SOLUTION ORAL EVERY 6 HOURS PRN
Qty: 118 ML | Refills: 0 | Status: SHIPPED | OUTPATIENT
Start: 2024-06-05 | End: 2024-06-15

## 2024-06-05 RX ORDER — AZITHROMYCIN 250 MG/1
TABLET, FILM COATED ORAL
Qty: 6 TABLET | Refills: 0 | Status: SHIPPED | OUTPATIENT
Start: 2024-06-05 | End: 2024-06-10

## 2024-06-05 NOTE — PROGRESS NOTES
Patient ID: Amna Chawla is a 58 y.o. female.    Chief Complaint: URI (Entered automatically based on patient selection in Patient Portal.)    The patient initiated a request through [employer] on 6/5/2024 for evaluation and management with a chief complaint of URI (Entered automatically based on patient selection in Patient Portal.)     I evaluated the questionnaire submission on 06/05/2024.    Pt c productive cough and chills that started two days ago.  Also c h/a and body aches.  possible fever but has not checked temp.  No relief c otc tylenol.  States home COVID test negative.      [facility] E-Visit Covid    6/5/2024  8:56 AM CDT - Filed by Patient   Do you agree to participate in an E-Visit? Yes   If you have any of the following symptoms, go to your local emergency room or call 911: I acknowledge   What is the main issue you would like addressed today? Chills ans cough i think its just the flu   Do you think you might have COVID or the Flu? Yes Flu   Have you tested positive for COVID or Flu? No   What symptoms do you currently have?  Chills;  Cough;  Headache;  Muscle or body aches   Describe your cough: Productive (containing mucus)   Describe the mucus: Clear   Have you ever smoked? I have never smoked   Have you had a fever? Yes   What has been the range of your fever? I have not checked my temperature with a thermometer.   When did your symptoms first appear? 6/3/2024   In the last two weeks, have you been in close contact with someone who has COVID-19 or the Flu? No   List what you have done or taken to help your symptoms. Tylenol   How severe are your symptoms? Mild   Have your symptoms gotten better or worse since they started?  Worse   Do you have transportation to get testing if it is needed and ordered for you at an [facility] location? No   Provide any additional information you feel is important.    Please attach any relevant images or files    Are you able to take your vital signs? No          Encounter Diagnoses   Name Primary?    Acute cough Yes    Atypical pneumonia         No orders of the defined types were placed in this encounter.     Medications Ordered This Encounter   Medications    azithromycin (Z-ROMY) 250 MG tablet     Sig: Take 2 tablets by mouth on day 1; Take 1 tablet by mouth on days 2-5     Dispense:  6 tablet     Refill:  0    guaiFENesin-codeine 100-10 mg/5 ml (TUSSI-ORGANIDIN NR)  mg/5 mL syrup     Sig: Take 5 mLs by mouth every 6 (six) hours as needed for Cough.     Dispense:  118 mL     Refill:  0     Order Specific Question:   I have reviewed the Prescription Drug Monitoring Program (PDMP) database for this patient prior to prescribing the above opioid medication     Answer:   Yes        No follow-ups on file.    Advised to take otc coricidin and mucinex.  Also sent in rx for cheratussin cough syrup.  Addendum: pt messaged back: temp 101.1   Will tx c zpak.  Rx sent.      E-Visit Time Tracking:    Day 1 Time (in minutes): 10    Total Time (in minutes): 10

## 2024-06-07 NOTE — PROGRESS NOTES
Individual Psychotherapy (PhD/LCSW)    6/5/2024    Site:  [facility]         Therapeutic Intervention: Met with patient.  Outpatient - Insight oriented psychotherapy 30 min - [id number]    Chief complaint/reason for encounter: depression     Interval history and content of current session: The patient location is: home in [place]  The chief complaint leading to consultation is: depression  Visit type: audiovisual  Total time spent with patient: 40 minutes  Each patient to whom he or she provides medical services by telemedicine is:  (1) informed of the relationship between the physician and patient and the respective role of any other health care provider with respect to management of the patient; and (2) notified that he or she may decline to receive medical services by telemedicine and may withdraw from such care at any time.    Notes: Follow-up with pt.  She has been having a difficult time recently as her 16 year old daughter is defiant and will not listen to her and takes the baby out to all kinds of places.  She is still seeing the father of the baby who is into drugs and pt worries pt is using marijuana a lot.  In addition pt's older daughter's 8 month ol baby has been diagnosed with a brain tumor behind her eye. She has gone to [facility] [place] and had the eye removed.  Pt worries about her and the younger daughter's reaction of jealousy toward the attention the older sister is getting.  Discussed ways to deal with all of these feelings.    Treatment plan:  Target symptoms: depression  Why chosen therapy is appropriate versus another modality: relevant to diagnosis  Outcome monitoring methods: self-report, observation  Therapeutic intervention type: insight oriented psychotherapy, supportive psychotherapy    Risk parameters:  Patient reports no suicidal ideation  Patient reports no homicidal ideation  Patient reports no self-injurious behavior  Patient reports no violent behavior    Verbal deficits:  None    Patient's response to intervention:  The patient's response to intervention is motivated.    Progress toward goals and other mental status changes:  The patient's progress toward goals is fair .    Diagnosis:     ICD-10-CM ICD-9-CM   1. Depression, major, recurrent, moderate  F33.1 296.32       Plan:  individual psychotherapy, group psychotherapy and medication management by physician    Return to clinic: 1 month

## 2024-06-10 ENCOUNTER — PATIENT MESSAGE (OUTPATIENT)
Dept: INTERNAL MEDICINE | Facility: CLINIC | Age: 59
End: 2024-06-10
Payer: MEDICARE

## 2024-06-11 ENCOUNTER — PATIENT MESSAGE (OUTPATIENT)
Dept: PSYCHIATRY | Facility: CLINIC | Age: 59
End: 2024-06-11
Payer: MEDICARE

## 2024-06-11 ENCOUNTER — PATIENT MESSAGE (OUTPATIENT)
Dept: NEUROLOGY | Facility: CLINIC | Age: 59
End: 2024-06-11
Payer: MEDICARE

## 2024-06-30 ENCOUNTER — EXTERNAL CHRONIC CARE MANAGEMENT (OUTPATIENT)
Dept: PRIMARY CARE CLINIC | Facility: CLINIC | Age: 59
End: 2024-06-30
Payer: MEDICARE

## 2024-06-30 PROCEDURE — 99490 CHRNC CARE MGMT STAFF 1ST 20: CPT | Mod: S$GLB,,, | Performed by: INTERNAL MEDICINE

## 2024-07-02 ENCOUNTER — HOSPITAL ENCOUNTER (OUTPATIENT)
Dept: RADIOLOGY | Facility: HOSPITAL | Age: 59
Discharge: HOME | End: 2024-07-02
Attending: INTERNAL MEDICINE
Payer: MEDICARE

## 2024-07-02 ENCOUNTER — OFFICE VISIT (OUTPATIENT)
Dept: PSYCHIATRY | Facility: CLINIC | Age: 59
End: 2024-07-02
Payer: MEDICARE

## 2024-07-02 VITALS
HEART RATE: 59 BPM | SYSTOLIC BLOOD PRESSURE: 116 MMHG | BODY MASS INDEX: 50.41 KG/M2 | DIASTOLIC BLOOD PRESSURE: 64 MMHG | WEIGHT: 293 LBS

## 2024-07-02 DIAGNOSIS — Z12.31 SCREENING MAMMOGRAM, ENCOUNTER FOR: ICD-10-CM

## 2024-07-02 DIAGNOSIS — F33.1 DEPRESSION, MAJOR, RECURRENT, MODERATE: Primary | ICD-10-CM

## 2024-07-02 DIAGNOSIS — G47.00 INSOMNIA, UNSPECIFIED TYPE: ICD-10-CM

## 2024-07-02 DIAGNOSIS — F41.9 ANXIETY: ICD-10-CM

## 2024-07-02 PROCEDURE — 4010F ACE/ARB THERAPY RXD/TAKEN: CPT | Mod: CPTII,S$GLB,, | Performed by: NURSE PRACTITIONER

## 2024-07-02 PROCEDURE — 3008F BODY MASS INDEX DOCD: CPT | Mod: CPTII,S$GLB,, | Performed by: NURSE PRACTITIONER

## 2024-07-02 PROCEDURE — 99999 PR PBB SHADOW E&M-EST. PATIENT-LVL II: CPT | Mod: PBBFAC,,, | Performed by: NURSE PRACTITIONER

## 2024-07-02 PROCEDURE — 3044F HG A1C LEVEL LT 7.0%: CPT | Mod: CPTII,S$GLB,, | Performed by: NURSE PRACTITIONER

## 2024-07-02 PROCEDURE — 3078F DIAST BP <80 MM HG: CPT | Mod: CPTII,S$GLB,, | Performed by: NURSE PRACTITIONER

## 2024-07-02 PROCEDURE — 77067 SCR MAMMO BI INCL CAD: CPT | Mod: TC

## 2024-07-02 PROCEDURE — 90833 PSYTX W PT W E/M 30 MIN: CPT | Mod: S$GLB,,, | Performed by: NURSE PRACTITIONER

## 2024-07-02 PROCEDURE — 3074F SYST BP LT 130 MM HG: CPT | Mod: CPTII,S$GLB,, | Performed by: NURSE PRACTITIONER

## 2024-07-02 PROCEDURE — 99214 OFFICE O/P EST MOD 30 MIN: CPT | Mod: S$GLB,,, | Performed by: NURSE PRACTITIONER

## 2024-07-02 RX ORDER — CLONAZEPAM 1 MG/1
TABLET ORAL
Qty: 30 TABLET | Refills: 5 | Status: SHIPPED | OUTPATIENT
Start: 2024-07-02

## 2024-07-02 RX ORDER — ZOLPIDEM TARTRATE 10 MG/1
10 TABLET ORAL NIGHTLY PRN
Qty: 30 TABLET | Refills: 5 | Status: SHIPPED | OUTPATIENT
Start: 2024-07-02

## 2024-07-02 RX ORDER — SERTRALINE HYDROCHLORIDE 100 MG/1
200 TABLET, FILM COATED ORAL DAILY
Qty: 180 TABLET | Refills: 3 | Status: SHIPPED | OUTPATIENT
Start: 2024-07-02

## 2024-07-02 RX ORDER — ARIPIPRAZOLE 15 MG/1
15 TABLET ORAL DAILY
Qty: 90 TABLET | Refills: 3 | Status: SHIPPED | OUTPATIENT
Start: 2024-07-02

## 2024-07-02 NOTE — PATIENT INSTRUCTIONS
Continue Zoloft 200 mg  daily   Continue Abilify 15 mg po daily  Continue Ambien 10 mg po q sh PRN insomnia (Trazodone and  Zaleplon not effective)  Continue Klonopin 1 mg po daily PRN severe anxiety

## 2024-07-02 NOTE — PROGRESS NOTES
Outpatient Psychiatry Follow-Up Visit (MD/NP)    7/2/2024    Clinical Status of Patient:  Outpatient (Ambulatory)    Chief Complaint:  Amna Chawla is a 58 y.o. female who presents today for follow-up of depression and anxiety.  Met with patient.      Last Visit:  was on 4/28/22. Chart and  reviewed.     Current Medication Profile for Psych  Interval History and Content of Current Session:  Increase to Zoloft 200 mg  daily   Continue Abilify 15 mg po daily  Continue Ambien 10 mg po q sh PRN insomnia (Trazodone and  Zaleplon not effective)  Continue Klonopin 1 mg po daily PRN severe anxiety  Set appointment with Dr. Monroe for grief counseling      Discussed family stressors with her 16 year old daughter. States she ran out of some of her medications and needs refills.  Reports effective response to medications and denies side effects. Thought processes are linear, clear, and organized. Denies SI/HI/AVH.     Psychotherapy:  Target symptoms: depression, anxiety   Why chosen therapy is appropriate versus another modality: relevant to diagnosis  Outcome monitoring methods: self-report, observation  Therapeutic intervention type: insight oriented psychotherapy, CBT  Topics discussed/themes: relationships difficulties, parenting issues, stress related to medical comorbidities, difficulty managing affect in interpersonal relationships, building skills sets for symptom management, symptom recognition  The patient's response to the intervention is accepting. The patient's progress toward treatment goals is not progressing.   Duration of intervention: 20 minutes.    Review of Systems   PSYCHIATRIC: Pertinant items are noted in the narrative.  CONSTITUTIONAL: No weight gain or loss.   ENDOCRINE: No polydipsia or polyuria.  INTEGUMENTARY: No rashes or lacerations.  EYES: No exophthalmos, jaundice or blindness.  ENT: No dizziness, tinnitus or hearing loss.  RESPIRATORY: No shortness of breath.  GASTROINTESTINAL: No  nausea, vomiting, pain, constipation or diarrhea.  GENITOURINARY: No frequency, dysuria or sexual dysfunction.  HEMATOLOGIC/LYMPHATIC: No excessive bleeding, prolonged or excessive bleeding after dental extraction/injury.  ALLERGIC/IMMUNOLOGIC: No allergic response to materials, foods or animals at this time.    Past Medical, Family and Social History: The patient's past medical, family and social history have been reviewed and updated as appropriate within the electronic medical record - see encounter notes.    Compliance: yes    Side effects: None    Risk Parameters:  Patient reports no suicidal ideation  Patient reports no homicidal ideation  Patient reports no self-injurious behavior  Patient reports no violent behavior    Exam (detailed: at least 9 elements; comprehensive: all 15 elements)   Constitutional  Vitals:  Most recent vital signs, dated less than 90 days prior to this appointment, were reviewed.   Vitals:    07/02/24 1427   BP: 116/64   Pulse: (!) 59   Weight: 133.2 kg (293 lb 10.4 oz)        General:  NA     Musculoskeletal  Muscle Strength/Tone:  not examined   Gait & Station:  NA     Psychiatric  Speech:  no latency; no press   Mood & Affect:  dysthymic, sad  irritable   Thought Process:  normal and logical   Associations:  intact   Thought Content:  normal, no suicidality, no homicidality, delusions, or paranoia   Insight:  has awareness of illness   Judgement: behavior is adequate to circumstances, age appropriate   Orientation:  grossly intact, person, place, situation, time/date   Memory: intact for content of interview, able to remember recent events- yes, able to remember remote events- yes   Language: grossly intact   Attention Span & Concentration:  able to focus   Fund of Knowledge:  intact and appropriate to age and level of education, familiar with aspects of current personal life     Assessment and Diagnosis   Status/Progress: Based on the examination today, the patient's problem(s)  is/are worsening.  New problems have been presented today (grief).   Co-morbidities and Lack of compliance are not complicating management of the primary condition.  There are no active rule-out diagnoses for this patient at this time.     General Impression:       ICD-10-CM ICD-9-CM   1. Depression, major, recurrent, moderate  F33.1 296.32   2. Insomnia, unspecified type  G47.00 780.52   3. Anxiety  F41.9 300.00       Intervention/Counseling/Treatment Plan   Medication Management: The risks and benefits of medication were discussed with the patient.   Continue Zoloft 200 mg  daily   Continue Abilify 15 mg po daily  Continue Ambien 10 mg po q sh PRN insomnia (Trazodone and  Zaleplon not effective)  Continue Klonopin 1 mg po daily PRN severe anxiety  Set appointment with Dr. Monroe for grief counseling    Return to Clinic: 6 months     Risks, benefits, side effects and alternative treatments discussed with patient. Patient agrees with the current plan as documented.  Encouraged Patient to keep future appointments.  Take medications as prescribed and abstain from substance abuse.  Pt to present to ED for thoughts to harm herself or others

## 2024-07-09 ENCOUNTER — CLINICAL SUPPORT (OUTPATIENT)
Dept: CARDIOLOGY | Facility: HOSPITAL | Age: 59
End: 2024-07-09
Payer: MEDICARE

## 2024-07-09 ENCOUNTER — CLINICAL SUPPORT (OUTPATIENT)
Dept: CARDIOLOGY | Facility: HOSPITAL | Age: 59
End: 2024-07-09
Attending: INTERNAL MEDICINE
Payer: MEDICARE

## 2024-07-09 DIAGNOSIS — Z95.810 PRESENCE OF AUTOMATIC (IMPLANTABLE) CARDIAC DEFIBRILLATOR: ICD-10-CM

## 2024-07-09 PROCEDURE — 93295 DEV INTERROG REMOTE 1/2/MLT: CPT | Mod: ,,, | Performed by: INTERNAL MEDICINE

## 2024-07-18 ENCOUNTER — PROCEDURE VISIT (OUTPATIENT)
Dept: NEUROLOGY | Facility: CLINIC | Age: 59
End: 2024-07-18
Payer: MEDICARE

## 2024-07-18 VITALS
BODY MASS INDEX: 50.02 KG/M2 | WEIGHT: 293 LBS | HEART RATE: 74 BPM | DIASTOLIC BLOOD PRESSURE: 72 MMHG | HEIGHT: 64 IN | SYSTOLIC BLOOD PRESSURE: 106 MMHG

## 2024-07-18 DIAGNOSIS — G43.711 CHRONIC MIGRAINE WITHOUT AURA, WITH INTRACTABLE MIGRAINE, SO STATED, WITH STATUS MIGRAINOSUS: Primary | ICD-10-CM

## 2024-07-18 PROCEDURE — 99999PBSHW PR PBB SHADOW TECHNICAL ONLY FILED TO HB: Mod: JW,PBBFAC,,

## 2024-07-18 PROCEDURE — 64615 CHEMODENERV MUSC MIGRAINE: CPT | Mod: PBBFAC | Performed by: PSYCHIATRY & NEUROLOGY

## 2024-07-18 PROCEDURE — 64615 CHEMODENERV MUSC MIGRAINE: CPT | Mod: S$PBB,,, | Performed by: PSYCHIATRY & NEUROLOGY

## 2024-07-18 RX ADMIN — ONABOTULINUMTOXINA 200 UNITS: 100 INJECTION, POWDER, LYOPHILIZED, FOR SOLUTION INTRADERMAL; INTRAMUSCULAR at 03:07

## 2024-07-18 NOTE — PROCEDURES
Follow up:  Migraines are stable     Prior note:   Reports worsening LBP after COVID in Jan   On antibiotics for bronchitis     Prior note:   Reports nausea   Knees buckle frequently   No benefit from new shoes   Not Using cane     Prior note:   Reports overall worse HA   Feels tremors in whole body lasting 3-4 hrs   Tried ativan - helped for a few hours     Prior note:   She is grappling with grief of loss of her sister   Pending grief counseling      Prior note:   S/p course of prednisone for GI allergic reaction (scratch throat - seafood related)     Prior note:   Migraine Hz increased during storm - almost daily   One episode of lightheadedness. No SOB, CP       Prior note:   Reports episode of lightheadedness 2 days ago      Prior note:   S/p COVID vaccine      Prior note:   Reports more physical fatigue   taking 2 naps a day   L sided severe migraine lasted 2 days. Took ubrelvy, motrin, zanaflex      Prior note:   3 days of severe L sided HA + nausea   Gradual onset   Ubrelvy, zanaflex, flurbiprofen - not helping   Vision - nml      Prior note:   HA stable   Try Ubrelvy again      Prior note:   independent left and right parietal ice prick HA      Prior note:   HA much improved   Only 2 since last visit      Prior note:   HA are more frequent   Taking 1600 mg motrin + zanaflex   Went to ER recently      Prior note:   HA overall stable in Hz and intensity   Reports a wearing off effect of BOTOX since last week   Taking zanaflex 8 mg TID        Prior note:   HA started 12/24   She feels BOTOX wore off last week   Reports GI distress from taking to many motrin      Prior note:   4/week HA - L vertex   Jabs - vertex either sides      She reports migraine that woke her up in the morning - associated with L side facial droop      Prior note:   She gets acceptable relief for 2.5 months after BOTOX      Prior note:   No recurrent stroke symptoms   starting having whole body tremors since this AM. Took 1 tablet of  lorazepam   Last night had a HA, took trazodone       Admit Date: 4/11/2018  2:03 PM   Discharge Date and Time: 4/12/2018  8:30 PM   History of Present Illness: Ms. Chawla is a 51 yo F with afib on Eliquis (last dose this am), prior cardiac arrest with associated acute ischemic stroke with residual mild L sided weakness, CAD with ICD in situ, HTN, and HLD who presented with acute R sided weakness and sensation changes.  She originally called EMS for palpitations, but developed acute right sided facial droop and RUE weakness at 1:40pm today, after EMS had arrived.  She denies changes to speech or vision.  SBP en route 200/100.  She is ineligible for tPA 2/2 Eliquis use.  She is not suspected to have an LVO.  She will be admitted to [facility] for observation overnight.     Hospital Course (synopsis of major diagnoses, care, treatment, and services provided during the course of the hospital stay): Ms. Chawla was admitted for acute stroke workup. Pt with pacemaker so unable to obtain MRI Brain; CTA H/N demonstrated no evidence acute infarction, though did exhibit noncalcified plaquing of carotid bifurcations and proximal ICAs with < 50% stenosis, so Plavix was added to pt's daily home regimen. Concerned for TIA at this time though Migraine very high on differential due to pt's hx headaches, including presently this admission. Hypertensive urgency/emergency also considered due to pt's very elevated levels with EMS. Per chart review, Eliquis was a new medication since 4/3/18 (had switched from Coumadin), however staff Faraz concerned that pt had a potential event while on Eliquis. She wished to switch to Pradaxa as an alternative DOAC (as it has an option for reversal), however changed to Xarelto per pt's insurance coverage.   While admitted, pt given cocktail for HA which she has received previously at the infusion clinic - IV Magnesium, IM Toradol, 2mg IV Morphine, 500cc NS bolus (IV Benadryl not included this time as pt  had received a dose earlier that day prior to contrast imaging.) HA improved somewhat and pt was encouraged to follow up with Dr. Cortez for continued management of botox injections, etc. At that time, pt also found with hyponatremia; d/c'd Clorthalidone and plan was made for pt to [facility] 4[facility]16/18 for repeat CMP to evaluate Na level and BP check since discontinuing this medication.  Ms. Chawla was evaluated and treated by PT, OT, and SLP who recommend d/c with outpatient PT and OT. She was discharged home 4/12/18 with [facility] follow-up Monday      Prior note:   2 weeks ago BOTOX effect wore off   Used up all her percocet   3 wks h/o:   Reports frequent falls - falling forward, no LOC, no injuries, able to protect falls by hands   triggers - unknown   Also occ stumbling   Dragging L foot   No Pain in back or legs   No numbness or weakness in legs   No B/B incontinence   No lightheadedness      Prior note:    Flare up of TMJ and HA during daughter's wedding   NSAIDS helped   2 weeks ago BOTOX effect wore off      Prior note:   2 weeks ago BOTOX effect wore off   Has severe spasm and pain in the traps catalina   Weather change has provoked severe migraine + nausea   She started coumadin       Prior note:   3 weeks ago BOTOX effect wore off   She reports severe daily HA    During BOTOX periods she feels she  her HA. Much less intense      Prior note:   The patient is a 51-year-old female who [facility]. Since her last visit, she reports growing frustration   about the fact that nothing has helped her with regards to her headaches. She   continues to experience daily headaches. She takes mefenamic acid and   tizanidine every night, which only provides her two hours of relief. She is   unable to sleep because of the refractory headaches. She is incapacitated   because of severe headaches. She reports minimal relief with infusions  "that she  gets on a periodic basis. She does report an improvement overall with the   Botox. However, this effect has worn off in the last two weeks. She also   reports an episode wherein she fell with loss of consciousness, which was not   provoked. As a result, she injured her ankle and is currently wearing a boot.      Prior note:   since last week - Reports vertigo for 6 - 7 minutes upto 3 times daily   Nearly daily severe HA - no response to ponstel , compazine   She is late for her BOTOX - last 2 weeks were painful       Follow up:   R sided Headache is constant max at TMJ on R   Prior note:   She reports new episodes of R face tingling. Sh also reports 2 episodes of blurred vision lasting 15 minutes. These hapended while watching TV. Her pain remains unchanged   Follow up:   2 days of severe HA during Thanksgiving   Pounding bifrontal region   Pain worse over L eye brow   Follow up:   Reports bifrontal severe HA (worse on L) with no nausea with sudden onset . Occurs daily   NO note:  I found no papilledema or other changes to suggest elevated intracranial pressure or other alternative etiology for her headaches. Repeat exam in two years.  HA are less frequent and less intense.   (R levator scapula spasm)   symptoms better with lateral flexion to L   Prior note:   She still has daily HA with severe sharp stabbing pain behind L eye lasting for 15 sec   Prior note:   Daily pain. 2/week - nausea.  Shu Lares RN at 9/17/2014 4:53 PM  Pt presented to clinic for medical advice. Pt is seen by Dr. Paredes and Dr. Cortez.  1) She went ER on 9/13/14 for a migraine. She was given Reglan, Benadryl, MSO4 and IVF. A couple days later she developed an all over body "tremor". She states "I think I have Parkinson's". - Per Dr. Paredes, medication related tremor (Reglan & Benadryl). Informed her it should wear off in a few days. If not, she is to call and let us know.  2) Headaches - triggered by insomnia.   Current " "meds:  Ketoprofen - she took it once and said her "throat closed up" and she's not supposed to have anything with aspirin in it.  Nortriptyline - she was taking it at night, but it didn't help her sleep, so she stopped it.  Per Dr. Cortez, discontinue Ketoprofen and Nortriptyline. Start Effexor XR 37.5mg QD, tizanidine 4mg BID PRN headache and Klonopin 0.25 - 0.5mg QHS PRN. Will schedule pt for sphenocath procedure within the next week.   Prior note:   48 yo woman with history of HTN and asthma, both reportedly controlled, in hospital early 2013 after "passed out" and became unresponsive. CPR in the field for 8 minutes until EMS arrived. Per ED note, on arrival she was found to be in PEA, given epinephrine. Vfib/Vtach was achieved which was shocked x1. Patient converted to sinus tachycardia after shock. I do not know the time course for the above treatment. On arrival to facility patient was in sinus tachycardia with strong pulses, intubated and unresponsive. ET tube placement was confirmed with direct laryngoscopy and good bilateral breath sounds were heard after the tube was pulled back 2 cm. Reported to have "withdrawal" movements in ER during stabilization, then sedated and cooling protocol initiated. Had remarkable   recovery and first seen in clinic in April 2013.   Headache history: cluster   Age of onset - 2012   Location - behind L eye   Nature of pain - sharp   Prodrome - no   Aura - No   Duration of headache - 6-7 min   Time to peak intensity -   Pain scale - range of intensity - 9/10   Frequency - daily   Status Migrainosus history - 1-3 days   Time of day of most headaches- anytime   Associated symptoms with the headache:   Red eyes   swelling of L face   Headache history: Migraine   Age of onset - 2012   Location - bifrontal   Nature of pain - Pounding   Prodrome - no   Aura - No   Duration of headache - > 4 hrs   Time to peak intensity -   Pain scale - range of intensity - 9/10   Frequency - 5/week " "  Status Migrainosus history - 1-3 days   Time of day of most headaches- anytime   Associated symptoms with the headache:   Meningeal symptoms - photophobia, phonophobia, exercise intolerance +   Nausea/vomitting +   Nasal drainage   Visual blurriness +   Pallor/flushing   Dizziness   Vertigo   Confusion   Impaired concentration +   Pain worsened with physical activity +   Neck pain +   Symptoms of increased intracranial pressure:   Whooshing sounds - no   Visual spots/blotches - no   Headache Triggers: unknown   Other comorbid conditions:   Anxiety - no   Motion sickness symptom - no       Treatment history:   Resolution of headache with sleep - yes       Meds tried:   Trigger points involvin/9/15 helped for 1 day   Site: Right   Levator scapula   Pharmacological agent:   0.5% Sensorcaine solution   No complications were noted.  Supraorbital block - helped for 2 days   Tylenol - not help   imitrex - chest tightness   alleve - help   topamax - not helping   Neurontin - not helping   Paxil, Elavil - not help   seroquel - nightmare   Ketoprofen - she took it once and said her "throat closed up" and she's not supposed to have anything with aspirin in it.  Flurbiprofen - constipation   Nortriptyline - she was taking it at night, but it didn't help her sleep, so she stopped it.  reglan - parkinsonism   zanaflex - help   Toradol 30 mg IM inj at home - too expensive   BOTOX round #1 9/3/15 - helped (R levator scapula spasm)   Round #2 12/3/15 - helped   Round#3 3/316 - helped   Round #4 16 - helped   BOTOX#5 9/15/16 - helped     BOTOX#6 - 16 - helped   BOTOX#7 - 3/9/17 - helped    BOTOX#8 - 17 - helped    BOTOX#9 8/10/17 - helped   BOTOX#10 10/19/17 - helped   BOTOX#11 17 - helped   BOTOX#12 3/7/18 - helped   BOTOX#13 18 - helped    BOTOX#14 18 - helped     BOTOX#15 10/19/18 - helped      BOTOX#16 18 - helped   BOTOX#17 3/13/19 - helped    BOTOX#18 19 - helped     BOTOX#19 " 8/1/19 - helped      BOTOX#20 10/11/19 - helped     BOTOX#21 12/19/19 - helped   BOTOX#22 3/10/20 - helped    BOTOX#23 6/26/20 - helped   BOTOX#24 11/12/20 - helped  BOTOX#25 1/21/21 - helped   BOTOX#26 4/22/21 - helped   BOTOX#27 7/6/21 - helped    BOTOX#28 12/10/21 - helped     BOTOX#29 5/19/22 - helped   BOTOX#30 8/25/22 - helped  BOTOX#31 12/2/22 - helped  BOTOX#32 3/1/23 - helped  BOTOX#33 7/6/23 - helped  BOTOX#34 9/28/23 - helped  BOTOX#35 1/4/24 - helped  BOTOX#36 5/1/24 - helped    AIMOVIG (sept 1rst week, Oct first week, Nov first wk 140 mg, Dec first wk 140 mg, Jan, Feb) no benefit   Constipation +      Can not do RFA - pt has pacemaker   Procedure: IOVERA peripheral nerve focus cold therapy (non ablative cryotherapy) 12/30/15 - not help   Indication: Cryotherapy of select peripheral nerves of the head was performed to treat chronic headaches that failed to respond to several preventive pharmacological therapies.   Sedation: None   Informed consent: The procedure, risks, benefits and options were discussed with the patient. There are no contraindications to the procedure. The patient expressed understanding and agreed to the procedure. I verify that I personally obtained the patient's consent prior to the start of the procedure.  Location:  Bilateral Supra orbital nerve (3 cycles on R and 1 cycle on L)   Bilateral Occipital nerve   3 cycles   Pain relief: Pain score reduced 10/10->0/10   9/26 Bilateral Sphenopalatine Ganglion and bilateral Maxillary Branch (Trigeminal Nerve) Block - no help   ONB - not help     georges Pagan RN at 12/31/2014 3:54 PM                           Status: Signed                                       Expand All Collapse All   HEADACHE FASTTRACK  PAIN 7/10 NAUSEA 0/10  ROUND 1: TORADOL, IMITREX, MORPHINE, BENADRYL, AND MAGNESIUM  PAIN 7/10 NAUSEA 0/10  PT STATED SHE WAS READY TO GO HOME AND GO TO SLEEP. PT D/C[facility]ED IN [facility]                              Shannon Pagan, RODRIGO at 10/5/2015  12:49 PM                           Status: Signed                                       Expand All Collapse All   HEADACHE FASTTRACK  PAIN 8/10 NAUSEA 10/10  FIRST ROUND: tORADOL, BENADRYL, COMPAZINE, IMITREX, MAGNESIUM, AND VALPROATE.  PAIN 1/10 NAUSEA 0/10  PT READY TO GO HOME AND FEELING BETTER. PT LEFT IN NAD WITH FAMILY MEMBER  TOTAL TIME: 7578-0265                         Shannon Pagan RN at 10/20/2015 3:05 PM                           Status: Signed                                       Expand All Collapse All   HEADACHE FASTTRACK  PAIN 10/10 NAUSEA 0/10  FIRST ROUND: tORADOL, BENADRYL, COMPAZINE, IMITREX, MAGNESIUM, AND VALPROATE.  BP BEING MONITORED EVERY 15 MIN AND REMAINED 170'S/80-90'S. DR CHOPRA NOTIFIED AND STATED TO MAKE SURE BP DID NOT EXCEED 180 SYSTOLIC OR PT SHOULD REPORT TO ED. BP AT 1500 183/92. DR CHOPRA NOTIFIED AND GAVE ORDER TO STOP INFUSION AND SEND PATIENT TO ED. PT BROUGHT TO ED BY INFUSION NURSE VIA WHEELCHAIR.   PAIN 8/10 NAUSEA 0/10  TOTAL TIME: [id number]                                                     Progress Notes                                               hSannon Pagan RN at 2/24/2016 1:36 PM       Status: Signed                  Expand All Collapse All   HEADACHE FASTTRACK  PAIN 10/10 NAUSEA 7/10  FIRST ROUND: tORADOL, BENADRYL, AND MORPHINE-BP RANGING FROM 177-203/84-92, HR 60-82  MD NOTIFIED AND GAVE ORDERS TO SEND TO ED. PT LEFT VIA W/C WITH INFUSION NURSE ON WAY TO ED.            Headache risk factors:   H/o TBI - [facility] (2013) + neck sprain   H/o Meningitis - no   F/h Aneurysms - no       Review of Systems   Constitutional: Negative.   HENT: Negative.   Eyes: Negative.   Respiratory: Negative.   Cardiovascular: Negative.   Gastrointestinal: Negative.   Endocrine: Negative.   Genitourinary: Negative.   Musculoskeletal: Negative.   Skin: Negative.   Allergic/Immunologic: Negative.   Hematological: Negative.   Psychiatric/Behavioral: insomnia      Objective:     Physical Exam    Constitutional: She is oriented to person, place, and time. She appears well-developed.   obesity   Psychiatric: She has a normal mood and flat affect. Her behavior is normal. Judgment and thought content normal.   Headache Exam:  Optic disc is flat OU   Temples appear symmetric  No V1,V2,V3 distribution allodynia/hyperalgesia catalina   No facial swelling  No gross TMJ abnormalities   No ptosis   No conj injection   No meningismus   No neck stiffness  No C spine tenderness  Cervical facet tenderness on palpation catalina   No tender points over trapezium   catalina supraorbital nerve exit point tenderness  catalina occipital nerve exit point tenderness  No auriculotemporal nerve tenderness   Tenderness of levator scapula catalina    Gait - wide based gait                       Assessment:                              1.  Occipital neuralgia                              2.  Cervicalgia                              3.  4  5  6 Chronic migraine without aura, with intractable migraine, so stated, with status migrainosus (post traumatic) post concussive?  Depression   TMJ - R sided   Insomnia - SHIRA      Head CT  Findings: There is no evidence of acute or recent major vascular territory cerebral infarction, parenchymal hemorrhage, or intra-axial mass.  There are no extra-axial masses or abnormal fluid collections.  The ventricular system is within normal limits of size for age and shows no distortion by mass-effect or evidence of hydrocephalus.  There is no fracture or destructive osseous lesion.  The extracranial soft tissues are unremarkable.  The visualized paranasal sinuses and mastoid air cells are clear.   Impression    No acute intracranial abnormality.  ______________________________________     Electronically signed by resident: KRISSY MAYERS MD  Date: 03/14/16  Time: 17:09            Plan:                              1. Prophylactic medication -   Despiramine 25 mg AM (for dizziness) PRN  zoloft 25 mg daily    Aricept 20 mg daily    Neurontin 900 mg TID    Magnesium 200 mg daily  Topamax 200 mg BID   Clonazepam BID for anxiety PRN  On trazodone   Cont B2, B6 supplements  On bellsomra    2, Breakthrough headache - zanaflex 8 mg Q8, etodolac - insurance will not pay for no clinical reason   Diclofenac   Multiple alternative treatment options were offered to the patient   3. Refrain from over counter pain medications. Discussed medication overuse headache.cont Magnesium 400 mg PO BID   4. Occipital neuralgia - If medical management is ineffective may consider occipital nerve blocks.   5. I again urged the patient to keep a headache calender.    f/up Dr. Rock for TBI   Vit D supplements    f/up sleep clinic - CPAP - waiting for new machine   Cont AJOVY (April 2019- ) - gap Feb-April 2021)  No side effects     Consider  ONB 2 weeks prior to next BOTOX       BOTOX treatment response:   Prior to initiating BOTOX, the patient had 28   migraine days per month on average. This meets criteria for chronic migraine.   After starting BOTOX, the patient experienced a reduction in  25  days per month   After starting BOTOX, the patient experienced a reduction in > 100 hours of migraine symptoms per month   After starting BOTOX, the patient experienced a 50% reduction in burden of migraine days per month   Based on this information, BOTOX is medically necessary for the management of the patient's chronic migraine.     BOTOX Injection intervals:   Patient reports a 'wearing off effect' prior to the subsequent BOTOX injections at 12 weeks. This occurs at week 9-10  Symptoms of this a 'wearing off effect' include - worsening of migraine headache frequency and intensity   Medications used during the 'wearing off effect' period include flurbiprofen, zanaflex  Based on this information, it is medically necessary for the patient to receive BOTOX therapy at an interval of every 10 weeks for the management of chronic migraine.    BOTOX was performed as an indicated  therapy for intractable chronic migraine headaches given that the patient failed > 5 prophylactic medications  Botulinum Toxin Injection Procedure   Pre-operative diagnosis: Chronic migraine   Post-operative diagnosis: Same   Procedure: Chemical neurolysis   After risks and benefits were explained including bleeding, infection, worsening of pain, damage to the areas being injected, weakness of muscles, loss of muscle control, dysphagia if injecting the head or neck, facial droop if injecting the facial area, painful injection, allergic or other reaction to the medications being injected, and the failure of the procedure to help the problem, a signed consent was obtained.   The patient was placed in a comfortable area and the sites to be treated were identified.The area to be treated was prepped three times with alcohol and the alcohol allowed to dry. Next, a 30 gauge needle was used to inject the medication in the area to be treated.   Area(s) injected:   Total Botox used: 155 Units   Botox wastage: 45 Units   Injection sites:     muscle bilaterally ( a total of 5 units divided into 2 sites)   Procerus muscle (5 units)   Frontalis muscle bilaterally (a total of 20 units divided into 4 sites)   Temporalis muscle bilaterally (a total of 50 units divided into 8 sites)   Occipitalis muscle bilaterally (a total of 30 units divided into 6 sites)   Cervical paraspinal muscles (a total of 20 units divided into 4 sites)   Trapezius muscle bilaterally (a total of 30 units divided into 6 sites)     Complications: none       RTC for the next Botox injection: 10 weeks    Procedures

## 2024-07-31 ENCOUNTER — EXTERNAL CHRONIC CARE MANAGEMENT (OUTPATIENT)
Dept: PRIMARY CARE CLINIC | Facility: CLINIC | Age: 59
End: 2024-07-31
Payer: MEDICARE

## 2024-07-31 PROCEDURE — 99490 CHRNC CARE MGMT STAFF 1ST 20: CPT | Mod: PBBFAC | Performed by: INTERNAL MEDICINE

## 2024-07-31 PROCEDURE — 99490 CHRNC CARE MGMT STAFF 1ST 20: CPT | Mod: S$PBB,,, | Performed by: INTERNAL MEDICINE

## 2024-08-27 DIAGNOSIS — Z00.00 ENCOUNTER FOR MEDICARE ANNUAL WELLNESS EXAM: ICD-10-CM

## 2024-08-31 ENCOUNTER — EXTERNAL CHRONIC CARE MANAGEMENT (OUTPATIENT)
Dept: PRIMARY CARE CLINIC | Facility: CLINIC | Age: 59
End: 2024-08-31
Payer: MEDICARE

## 2024-08-31 PROCEDURE — 99490 CHRNC CARE MGMT STAFF 1ST 20: CPT | Mod: S$GLB,,, | Performed by: INTERNAL MEDICINE

## 2024-09-04 ENCOUNTER — PATIENT MESSAGE (OUTPATIENT)
Dept: NEUROLOGY | Facility: CLINIC | Age: 59
End: 2024-09-04
Payer: MEDICARE

## 2024-09-10 ENCOUNTER — OFFICE VISIT (OUTPATIENT)
Dept: INTERNAL MEDICINE | Facility: CLINIC | Age: 59
End: 2024-09-10
Payer: MEDICARE

## 2024-09-10 VITALS
BODY MASS INDEX: 50.02 KG/M2 | HEIGHT: 64 IN | HEART RATE: 70 BPM | SYSTOLIC BLOOD PRESSURE: 130 MMHG | WEIGHT: 293 LBS | DIASTOLIC BLOOD PRESSURE: 82 MMHG

## 2024-09-10 DIAGNOSIS — E78.2 MIXED HYPERLIPIDEMIA: ICD-10-CM

## 2024-09-10 DIAGNOSIS — Z86.73 HISTORY OF TIA (TRANSIENT ISCHEMIC ATTACK): ICD-10-CM

## 2024-09-10 DIAGNOSIS — J01.00 ACUTE NON-RECURRENT MAXILLARY SINUSITIS: ICD-10-CM

## 2024-09-10 DIAGNOSIS — Z00.00 VISIT FOR ANNUAL HEALTH EXAMINATION: Primary | ICD-10-CM

## 2024-09-10 DIAGNOSIS — F33.1 DEPRESSION, MAJOR, RECURRENT, MODERATE: ICD-10-CM

## 2024-09-10 DIAGNOSIS — D70.9 NEUTROPENIA, UNSPECIFIED TYPE: ICD-10-CM

## 2024-09-10 DIAGNOSIS — E55.9 VITAMIN D DEFICIENCY: ICD-10-CM

## 2024-09-10 DIAGNOSIS — E53.8 B12 DEFICIENCY: ICD-10-CM

## 2024-09-10 DIAGNOSIS — D51.9 ANEMIA DUE TO VITAMIN B12 DEFICIENCY, UNSPECIFIED B12 DEFICIENCY TYPE: ICD-10-CM

## 2024-09-10 DIAGNOSIS — R05.1 ACUTE COUGH: ICD-10-CM

## 2024-09-10 DIAGNOSIS — I48.0 PAROXYSMAL ATRIAL FIBRILLATION: ICD-10-CM

## 2024-09-10 DIAGNOSIS — F41.9 ANXIETY: ICD-10-CM

## 2024-09-10 DIAGNOSIS — E66.01 CLASS 3 SEVERE OBESITY DUE TO EXCESS CALORIES WITH SERIOUS COMORBIDITY AND BODY MASS INDEX (BMI) OF 50.0 TO 59.9 IN ADULT: ICD-10-CM

## 2024-09-10 DIAGNOSIS — I10 ESSENTIAL HYPERTENSION: ICD-10-CM

## 2024-09-10 DIAGNOSIS — Z86.73 HISTORY OF STROKE: ICD-10-CM

## 2024-09-10 DIAGNOSIS — G43.711 CHRONIC MIGRAINE WITHOUT AURA, WITH INTRACTABLE MIGRAINE, SO STATED, WITH STATUS MIGRAINOSUS: ICD-10-CM

## 2024-09-10 DIAGNOSIS — Z79.01 LONG TERM (CURRENT) USE OF ANTICOAGULANTS: ICD-10-CM

## 2024-09-10 DIAGNOSIS — R73.03 PREDIABETES: ICD-10-CM

## 2024-09-10 PROCEDURE — 3008F BODY MASS INDEX DOCD: CPT | Mod: CPTII,S$GLB,, | Performed by: INTERNAL MEDICINE

## 2024-09-10 PROCEDURE — 3079F DIAST BP 80-89 MM HG: CPT | Mod: CPTII,S$GLB,, | Performed by: INTERNAL MEDICINE

## 2024-09-10 PROCEDURE — 99396 PREV VISIT EST AGE 40-64: CPT | Mod: S$GLB,,, | Performed by: INTERNAL MEDICINE

## 2024-09-10 PROCEDURE — 1160F RVW MEDS BY RX/DR IN RCRD: CPT | Mod: CPTII,S$GLB,, | Performed by: INTERNAL MEDICINE

## 2024-09-10 PROCEDURE — 3044F HG A1C LEVEL LT 7.0%: CPT | Mod: CPTII,S$GLB,, | Performed by: INTERNAL MEDICINE

## 2024-09-10 PROCEDURE — 99999 PR PBB SHADOW E&M-EST. PATIENT-LVL III: CPT | Mod: PBBFAC,,, | Performed by: INTERNAL MEDICINE

## 2024-09-10 PROCEDURE — 4010F ACE/ARB THERAPY RXD/TAKEN: CPT | Mod: CPTII,S$GLB,, | Performed by: INTERNAL MEDICINE

## 2024-09-10 PROCEDURE — 3075F SYST BP GE 130 - 139MM HG: CPT | Mod: CPTII,S$GLB,, | Performed by: INTERNAL MEDICINE

## 2024-09-10 PROCEDURE — 1159F MED LIST DOCD IN RCRD: CPT | Mod: CPTII,S$GLB,, | Performed by: INTERNAL MEDICINE

## 2024-09-10 RX ORDER — CODEINE PHOSPHATE AND GUAIFENESIN 10; 100 MG/5ML; MG/5ML
5 SOLUTION ORAL 3 TIMES DAILY PRN
Qty: 118 ML | Refills: 0 | Status: SHIPPED | OUTPATIENT
Start: 2024-09-10 | End: 2024-09-20

## 2024-09-10 RX ORDER — AZITHROMYCIN 250 MG/1
TABLET, FILM COATED ORAL
Qty: 6 TABLET | Refills: 0 | Status: SHIPPED | OUTPATIENT
Start: 2024-09-10 | End: 2024-09-15

## 2024-09-10 RX ORDER — METHYLPREDNISOLONE 4 MG/1
TABLET ORAL
Qty: 21 EACH | Refills: 0 | Status: SHIPPED | OUTPATIENT
Start: 2024-09-10 | End: 2024-10-01

## 2024-09-10 RX ORDER — BENZONATATE 100 MG/1
100 CAPSULE ORAL 3 TIMES DAILY PRN
Qty: 30 CAPSULE | Refills: 0 | Status: SHIPPED | OUTPATIENT
Start: 2024-09-10 | End: 2024-09-20

## 2024-09-10 NOTE — PATIENT INSTRUCTIONS
Delsym cough syrup  Schedule follow up with Dr. Vasquez  Get your flu shot and COVID booster when feeling better.

## 2024-09-10 NOTE — PROGRESS NOTES
INTERNAL MEDICINE ESTABLISHED PATIENT VISIT NOTE    Subjective:     Chief Complaint: Annual Exam       Patient ID: Amna Chawla is a 59 y.o. female with hx CVA and TIA c residual cognitive deficits (most recently c TIA 9/2018 per pt, has mild B weakness from several strokes in the past), carotid a disease, HTN, paroxysmal A fib c LAE, hx sudden cardiac arrest c VF and no ischemic heart disease and preserved EF (2013), intermittent 3rd deg AV block and symptomatic LVEF of 40% in setting of normal PET stress and chronic RV pacing s/p CRT-D, HLD, thyroid nodule s/p FNA 7/2018 c path c/w benign follicular nodule, depression with anxiety followed by psych, chronic complex h/a c occipital neuralgia followed by Neuro and on mult meds and has also had tx c botox, GERD c hiatal hernia, B carpal tunnel s/p release B, hx L middle ring finger trigger finger s/p release, SHIRA on APAP 6-20cm followed in sleep clinic, insomnia, prediabetes, last seen by me in April, here today for annual exam.    Today c c/o sinus congestion and cough x 2 weeks.  States she tested neg for COVID x2 at home.  No fever or SOB.    Taking meds as rx'ed.      Past Medical History:  Past Medical History:   Diagnosis Date    Anticoagulant long-term use     Anxiety     Asthma     Atrial fibrillation     Brain anoxic injury     Cervicalgia 8/28/2014    CHI (closed head injury) 2/19/2013    Convulsion 5/30/2015    Decreased ROM of left shoulder 4/12/2017    Defibrillator activation 2013    Depression     Heart block     History of sudden cardiac arrest 2/2013    PEA arrest with subsequent long-QT    Hx of psychiatric care     Hypertension     Left atrial enlargement 4/11/2018    Pacemaker 2013    Paresthesia 11/1/2013    Prolonged Q-T interval on ECG 2/8/2013    Psychiatric problem     Seizures     Sleep difficulties     Stroke     weakness lt side    Therapy     Thyroid disease     Upper airway resistance syndrome 2/21/2017       Home Medications:  Prior  to Admission medications    Medication Sig Start Date End Date Taking? Authorizing Provider   amLODIPine (NORVASC) 5 MG tablet Take 1 tablet (5 mg total) by mouth once daily. 4/1/24 4/1/25 Yes Tiffany Mina MD   ARIPiprazole (ABILIFY) 15 MG Tab Take 1 tablet (15 mg total) by mouth once daily. 7/2/24  Yes Kade Huffman III, NP   carvediloL (COREG) 25 MG tablet Take 2 tablets (50 mg total) by mouth 2 (two) times daily with meals. 12/8/23  Yes Mariel Tomlinson NP   clonazePAM (KLONOPIN) 1 MG tablet TAKE 1 TABLET BY MOUTH DAILY AS NEEDED FOR ANXIETY 7/2/24  Yes Kade Huffman III, NP   cyanocobalamin, vitamin B-12, 1,000 mcg Subl Place 1,000 mcg under the tongue once daily. 9/20/23  Yes Dasha Osorio PA-C   donepezil (ARICEPT) 10 MG tablet Take 1 tablet (10 mg total) by mouth 2 (two) times daily. 7/18/17  Yes Toby Paredes MD   flurbiprofen (ANSAID) 100 MG tablet Take 1 tablet (100 mg total) by mouth 2 (two) times daily as needed (migraine). 2/24/23  Yes David Cortez MD   gabapentin (NEURONTIN) 300 MG capsule Take 1 capsule (300 mg total) by mouth 3 (three) times daily. 2/2/23  Yes Hugo Borden MD   losartan (COZAAR) 100 MG tablet Take 1 tablet (100 mg total) by mouth once daily. 4/1/24 4/1/25 Yes Tiffany Mina MD   magnesium 200 mg Tab Take 200 mg by mouth once daily. 3/7/18  Yes David Cortez MD   omeprazole (PRILOSEC) 40 MG capsule Take 1 capsule (40 mg total) by mouth once daily. Take 30 minutes before breakfast 12/8/23 12/7/24 Yes Mariel Tomlinson NP   ondansetron (ZOFRAN-ODT) 4 MG TbDL DISSOLVE 1 TABLET(4 MG) ON THE TONGUE EVERY 12 HOURS AS NEEDED FOR NAUSEA 12/8/23  Yes Mariel Tomlinson NP   polyethylene glycol (GLYCOLAX) 17 gram PwPk Take 17 g by mouth once daily. 7/6/23  Yes David Cortez MD   rosuvastatin (CRESTOR) 40 MG Tab Take 1 tablet (40 mg total) by mouth every evening. 4/1/24  Yes Tiffany Mina MD   tiaGABine (GABITRIL) 4 MG tablet Take 1 tablet (4 mg  total) by mouth every evening. 8/3/20  Yes David Cortez MD   tiZANidine (ZANAFLEX) 4 MG tablet Take 1 tablet (4 mg total) by mouth 3 (three) times daily as needed (muscle pain). 12/12/23  Yes David Cortez MD   ubrogepant (UBROGEPANT) 100 mg tablet Take 1 tablet (100 mg total) by mouth 2 (two) times daily as needed for Migraine. 6/22/21  Yes David Cortez MD   XARELTO 20 mg Tab TAKE 1 TABLET BY MOUTH DAILY EVENING MEAL WITH DINNER 11/6/23  Yes Avelino Meija MD   zolpidem (AMBIEN) 10 mg Tab Take 1 tablet (10 mg total) by mouth nightly as needed (insomnia). 7/2/24  Yes Kade Huffman III, IDA   blood sugar diagnostic Strp 1 strip by Misc.(Non-Drug; Combo Route) route 2 (two) times daily.  Patient not taking: Reported on 5/1/2024 5/20/19   Tiffany Mina MD   blood-glucose meter kit Please provide with insurance covered meter  Patient not taking: Reported on 5/1/2024 5/20/19   Tiffany Mina MD   cetirizine (ZYRTEC) 10 MG tablet Take 1 tablet (10 mg total) by mouth once daily. for 7 days 7/13/22 3/18/24  Laina Melvin FNP   diclofenac (VOLTAREN) 50 MG EC tablet Take 1 tablet (50 mg total) by mouth 3 (three) times daily as needed (migraine).  Patient not taking: Reported on 9/10/2024 1/4/24   David Cortez MD   diclofenac sodium (VOLTAREN) 1 % Gel Apply 2 g topically 4 (four) times daily.  Patient not taking: Reported on 5/1/2024 9/20/23   Dasha Osorio, GORDON   EPINEPHrine (EPIPEN) 0.3 mg/0.3 mL AtIn Inject 0.3 mLs (0.3 mg total) into the muscle once. for 1 dose 5/1/24 5/1/24  David Cortez MD   etodolac (LODINE) 500 MG tablet Take 1 tablet (500 mg total) by mouth 2 (two) times daily as needed (migraine).  Patient not taking: Reported on 5/1/2024 11/16/23   David Cortez MD   flurbiprofen (ANSAID) 100 MG tablet Take 1 tablet (100 mg total) by mouth 2 (two) times daily as needed (migraine).  Patient not taking: Reported on 9/10/2024 2/24/23   David Cortez MD   fremanezumab-[facility] (AJOVY) 225  mg/1.5 mL injection Inject 1.5 mLs (225 mg total) into the skin every 28 days.  Patient not taking: Reported on 5/1/2024 2/17/22   David Cortez MD   furosemide (LASIX) 20 MG tablet Take 1 tablet (20 mg total) by mouth every other day.  Patient not taking: Reported on 5/1/2024 5/10/23 5/9/24  Tiffany Mina MD   hydrOXYzine HCL (ATARAX) 25 MG tablet Take 1 tablet (25 mg total) by mouth 3 (three) times daily as needed for Itching.  Patient not taking: Reported on 5/1/2024 7/13/22   Laina Melvin FNP   lancets Misc 1 Device by Misc.(Non-Drug; Combo Route) route 2 (two) times daily.  Patient not taking: Reported on 5/1/2024 5/20/19   Tiffany Mina MD   sertraline (ZOLOFT) 100 MG tablet Take 2 tablets (200 mg total) by mouth once daily.  Patient not taking: Reported on 9/10/2024 7/2/24   Kade Huffman III, NP   TRUE METRIX GLUCOSE METER Misc TEST BG BID  Patient not taking: Reported on 5/1/2024 5/30/19   Provider, Historical   metFORMIN (GLUCOPHAGE) 500 MG tablet Take 1 tablet (500 mg total) by mouth 2 (two) times daily with meals. 4/19/22 6/2/22  Tiffany Mina MD       Allergies:  Review of patient's allergies indicates:   Allergen Reactions    Aspirin Hives    Imitrex [sumatriptan] Palpitations    Penicillins Hives and Swelling    Shellfish containing products Anaphylaxis     seafood    Reglan [metoclopramide hcl] Other (See Comments)     Parkinsonism        Social History:  Social History     Tobacco Use    Smoking status: Never     Passive exposure: Never    Smokeless tobacco: Never   Substance Use Topics    Alcohol use: Yes     Comment: wine, socially    Drug use: No        Review of Systems   Constitutional:  Negative for chills, fatigue and fever.   HENT:  Positive for congestion, postnasal drip, rhinorrhea, sinus pressure and sinus pain.    Respiratory:  Positive for cough. Negative for chest tightness and shortness of breath.    Cardiovascular:  Negative for chest pain.   Gastrointestinal:  Negative  "for abdominal pain and blood in stool.   Genitourinary:  Negative for dysuria and frequency.         Health Maintenance:     Immunizations:   Influenza 9/2023, rec repeat when feeling better.  TDap 10/2/2018  Pneumovax 9/2018, Prevnar 20 10/2023  Shingrix 10/2019, 3/2020 completed.  Moderna COVID 2/2021, 3/2021, 1/2022, updated booster rec this Fall, declined today as she states she had side effects with previous COVID boosters and is too busy at this time     Cancer Screening:  PAP: 9/2019 c Dr. Marroquin wnl, rec f/u  Mammogram:  7/2024 benign  Colonoscopy:  5/2019, polyps x2 removed, tubular adenoma dn tubulovillous adenoma on path c rec repeat in 3 yrs per report.    Repeat done 6/2022 c polyp x1, path c/w hyperplastic polyp, rec f/u in 5 yrs.    Objective:   /82   Pulse 70   Ht 5' 4" (1.626 m)   Wt 133.8 kg (294 lb 15.6 oz)   LMP 01/03/2016   BMI 50.63 kg/m²        General: AAO x3, no apparent distress  HEENT: no cervical LAD, thyroid nodules present, post nasal drip noted.  CV: RRR, no m/r/g  Pulm: Lungs CTAB, no crackles, no wheezes  Abd: s/NT/ND +BS  Extremities: no c/c/e    Labs:     Lab Results   Component Value Date    WBC 3.37 (L) 04/02/2024    HGB 10.1 (L) 04/02/2024    HCT 29.9 (L) 04/02/2024    MCV 90 04/02/2024     04/02/2024     Sodium   Date Value Ref Range Status   04/02/2024 138 136 - 145 mmol/L Final     Potassium   Date Value Ref Range Status   04/02/2024 3.4 (L) 3.5 - 5.1 mmol/L Final     Chloride   Date Value Ref Range Status   04/02/2024 106 95 - 110 mmol/L Final     CO2   Date Value Ref Range Status   04/02/2024 26 23 - 29 mmol/L Final     Glucose   Date Value Ref Range Status   04/02/2024 103 70 - 110 mg/dL Final     BUN   Date Value Ref Range Status   04/02/2024 12 6 - 20 mg/dL Final     Creatinine   Date Value Ref Range Status   04/02/2024 0.9 0.5 - 1.4 mg/dL Final     Calcium   Date Value Ref Range Status   04/02/2024 8.7 8.7 - 10.5 mg/dL Final     Total Protein   Date " Value Ref Range Status   04/02/2024 9.3 (H) 6.0 - 8.4 g/dL Final     Albumin   Date Value Ref Range Status   04/02/2024 2.8 (L) 3.5 - 5.2 g/dL Final     Total Bilirubin   Date Value Ref Range Status   04/02/2024 0.2 0.1 - 1.0 mg/dL Final     Comment:     For infants and newborns, interpretation of results should be based  on gestational age, weight and in agreement with clinical  observations.    Premature Infant recommended reference ranges:  Up to 24 hours.............<8.0 mg/dL  Up to 48 hours............<12.0 mg/dL  3-5 days..................<15.0 mg/dL  6-29 days.................<15.0 mg/dL       Alkaline Phosphatase   Date Value Ref Range Status   04/02/2024 59 55 - 135 U/L Final     AST   Date Value Ref Range Status   04/02/2024 10 10 - 40 U/L Final     ALT   Date Value Ref Range Status   04/02/2024 13 10 - 44 U/L Final     Anion Gap   Date Value Ref Range Status   04/02/2024 6 (L) 8 - 16 mmol/L Final     eGFR if    Date Value Ref Range Status   05/17/2022 >60.0 >60 mL/min/1.73 m^2 Final     eGFR if non    Date Value Ref Range Status   05/17/2022 >60.0 >60 mL/min/1.73 m^2 Final     Comment:     Calculation used to obtain the estimated glomerular filtration  rate (eGFR) is the CKD-EPI equation.        Lab Results   Component Value Date    HGBA1C 6.3 (H) 04/02/2024     Lab Results   Component Value Date    LDLCALC 108.4 04/02/2024     Lab Results   Component Value Date    TSH 2.264 04/02/2024         Assessment/Plan     Amna was seen today for annual exam.    Diagnoses and all orders for this visit:    Annual exam  History and physical exam completed.  Health maintenance reviewed as above.    Acute non-recurrent maxillary sinusitis  Acute cough  As per HPI   Given chronicity of symptoms, we will treat   -     azithromycin (Z-ROMY) 250 MG tablet; Take 2 tablets by mouth on day 1; Take 1 tablet by mouth on days 2-5  -     methylPREDNISolone (MEDROL DOSEPACK) 4 mg tablet; use as  directed  -     benzonatate (TESSALON) 100 MG capsule; Take 1 capsule (100 mg total) by mouth 3 (three) times daily as needed for Cough.  -     guaiFENesin-codeine 100-10 mg/5 ml (TUSSI-ORGANIDIN NR)  mg/5 mL syrup; Take 5 mLs by mouth 3 (three) times daily as needed for Cough.    Prediabetes  Lab Results   Component Value Date    HGBA1C 6.3 (H) 04/02/2024     Pt was counseled on the need to avoid intake of simple sugars and minimize carbohydrates.  Also recommended weight loss and daily exercise.  -     Comprehensive Metabolic Panel; Future  -     Hemoglobin A1C; Future  -     CBC Auto Differential; Future    Essential hypertension  at goal.  Cont current medications.    Mixed hyperlipidemia  Lab Results   Component Value Date    LDLCALC 108.4 04/02/2024     Above goal on last check  On max Crestor, will repeat labs and if still high will consider adding Zetia  -     Lipid Panel; Future    Paroxysmal atrial fibrillation  Long term (current) use of anticoagulants  As per EP, cont routine f/u  Rate/rhythm controlled    B12 deficiency  Lab Results   Component Value Date    [id number] 308 04/02/2024     Marginally low on last check, needs updated labs.  -     Vitamin B12; Future    Anemia due to vitamin B12 deficiency, unspecified B12 deficiency type  Neutropenia, unspecified type  Stable on last check, will monitor.    Vitamin D deficiency  -     Vitamin D; Future    Chronic migraine without aura, with intractable migraine, so stated, with status migrainosus  Followed by neuro, cont routine f/u    Anxiety  Depression, major, recurrent, moderate  Followed by psych, cont routine f/u    History of stroke  History of TIA (transient ischemic attack)  No acute issues, cont bp and lipid control.    Class 3 severe obesity due to excess calories with serious comorbidity and body mass index (BMI) of 50.0 to 59.9 in adult  Counseled extensively on need for diet changes and daily exercise.  Discussed examples of healthy  eating.  Recommend at least 30 minutes of exercise at least 5 days a week.      Thyroid nodules  Advised to schedule f/u u/s which is due.  Order in system.    HM as above  RTC in 6 mos, sooner if needed.    Tiffany Mina MD  Department of Internal Medicine - [facility]  09/12/2024

## 2024-09-12 PROBLEM — N30.01 ACUTE CYSTITIS WITH HEMATURIA: Status: RESOLVED | Noted: 2024-03-24 | Resolved: 2024-09-12

## 2024-09-20 DIAGNOSIS — M62.838 MUSCLE SPASM: ICD-10-CM

## 2024-09-20 DIAGNOSIS — G43.711 CHRONIC MIGRAINE WITHOUT AURA, WITH INTRACTABLE MIGRAINE, SO STATED, WITH STATUS MIGRAINOSUS: ICD-10-CM

## 2024-09-20 DIAGNOSIS — K21.9 GASTROESOPHAGEAL REFLUX DISEASE WITHOUT ESOPHAGITIS: ICD-10-CM

## 2024-09-20 DIAGNOSIS — I10 ESSENTIAL HYPERTENSION: ICD-10-CM

## 2024-09-20 RX ORDER — LOSARTAN POTASSIUM 100 MG/1
100 TABLET ORAL DAILY
Qty: 90 TABLET | Refills: 3 | Status: CANCELLED | OUTPATIENT
Start: 2024-09-20 | End: 2025-09-20

## 2024-09-21 RX ORDER — OMEPRAZOLE 40 MG/1
40 CAPSULE, DELAYED RELEASE ORAL DAILY
Qty: 90 CAPSULE | Refills: 3 | Status: SHIPPED | OUTPATIENT
Start: 2024-09-21 | End: 2025-09-21

## 2024-09-21 NOTE — TELEPHONE ENCOUNTER
Refill Decision Note   Amna Chawla  is requesting a refill authorization.  Brief Assessment and Rationale for Refill:  Approve     Medication Therapy Plan:         Comments:     Note composed:7:08 AM 09/21/2024

## 2024-09-21 NOTE — TELEPHONE ENCOUNTER
No care due was identified.  [facility] Embedded Care Due Messages. Reference number: [id number].   9/21/2024 7:08:36 AM CDT   normal...

## 2024-09-23 DIAGNOSIS — M62.838 MUSCLE SPASM: ICD-10-CM

## 2024-09-23 DIAGNOSIS — G43.711 CHRONIC MIGRAINE WITHOUT AURA, WITH INTRACTABLE MIGRAINE, SO STATED, WITH STATUS MIGRAINOSUS: ICD-10-CM

## 2024-09-23 RX ORDER — TIZANIDINE 4 MG/1
4 TABLET ORAL 3 TIMES DAILY PRN
Qty: 40 TABLET | Refills: 12 | Status: SHIPPED | OUTPATIENT
Start: 2024-09-23

## 2024-10-08 ENCOUNTER — CLINICAL SUPPORT (OUTPATIENT)
Dept: CARDIOLOGY | Facility: HOSPITAL | Age: 59
End: 2024-10-08
Payer: MEDICAID

## 2024-10-08 ENCOUNTER — CLINICAL SUPPORT (OUTPATIENT)
Dept: CARDIOLOGY | Facility: HOSPITAL | Age: 59
End: 2024-10-08
Attending: INTERNAL MEDICINE
Payer: MEDICAID

## 2024-10-08 DIAGNOSIS — Z95.810 PRESENCE OF AUTOMATIC (IMPLANTABLE) CARDIAC DEFIBRILLATOR: ICD-10-CM

## 2024-10-08 PROCEDURE — 93296 REM INTERROG EVL PM/IDS: CPT | Performed by: INTERNAL MEDICINE

## 2024-10-08 PROCEDURE — 93295 DEV INTERROG REMOTE 1/2/MLT: CPT | Mod: ,,, | Performed by: INTERNAL MEDICINE

## 2024-10-10 ENCOUNTER — PROCEDURE VISIT (OUTPATIENT)
Dept: NEUROLOGY | Facility: CLINIC | Age: 59
End: 2024-10-10
Payer: MEDICARE

## 2024-10-10 ENCOUNTER — TELEPHONE (OUTPATIENT)
Dept: NEUROLOGY | Facility: CLINIC | Age: 59
End: 2024-10-10
Payer: MEDICARE

## 2024-10-10 ENCOUNTER — PATIENT MESSAGE (OUTPATIENT)
Dept: NEUROLOGY | Facility: CLINIC | Age: 59
End: 2024-10-10
Payer: MEDICAID

## 2024-10-10 VITALS
SYSTOLIC BLOOD PRESSURE: 139 MMHG | HEART RATE: 41 BPM | BODY MASS INDEX: 50.02 KG/M2 | WEIGHT: 293 LBS | DIASTOLIC BLOOD PRESSURE: 65 MMHG | HEIGHT: 64 IN

## 2024-10-10 DIAGNOSIS — G43.711 CHRONIC MIGRAINE WITHOUT AURA, WITH INTRACTABLE MIGRAINE, SO STATED, WITH STATUS MIGRAINOSUS: Primary | ICD-10-CM

## 2024-10-10 DIAGNOSIS — I48.0 PAROXYSMAL ATRIAL FIBRILLATION: Primary | ICD-10-CM

## 2024-10-10 NOTE — TELEPHONE ENCOUNTER
"----- Message from Mariann sent at 10/10/2024 12:10 PM CDT -----  Regarding: pt adivce  Contact: [phone]  .Name Of Caller: Self     Contact Preference?: [phone]     What is the nature of the call?: pt needing a call back before scheduled appt 10/10/24 2pm , pls call      Additional Notes:  "Thank you for all that you do for our patients"  "

## 2024-10-10 NOTE — PROCEDURES
Follow up:  Migraines are stable     Prior note:   Reports worsening LBP after COVID in Jan   On antibiotics for bronchitis     Prior note:   Reports nausea   Knees buckle frequently   No benefit from new shoes   Not Using cane     Prior note:   Reports overall worse HA   Feels tremors in whole body lasting 3-4 hrs   Tried ativan - helped for a few hours     Prior note:   She is grappling with grief of loss of her sister   Pending grief counseling      Prior note:   S/p course of prednisone for GI allergic reaction (scratch throat - seafood related)     Prior note:   Migraine Hz increased during storm - almost daily   One episode of lightheadedness. No SOB, CP       Prior note:   Reports episode of lightheadedness 2 days ago      Prior note:   S/p COVID vaccine      Prior note:   Reports more physical fatigue   taking 2 naps a day   L sided severe migraine lasted 2 days. Took ubrelvy, motrin, zanaflex      Prior note:   3 days of severe L sided HA + nausea   Gradual onset   Ubrelvy, zanaflex, flurbiprofen - not helping   Vision - nml      Prior note:   HA stable   Try Ubrelvy again      Prior note:   independent left and right parietal ice prick HA      Prior note:   HA much improved   Only 2 since last visit      Prior note:   HA are more frequent   Taking 1600 mg motrin + zanaflex   Went to ER recently      Prior note:   HA overall stable in Hz and intensity   Reports a wearing off effect of BOTOX since last week   Taking zanaflex 8 mg TID        Prior note:   HA started 12/24   She feels BOTOX wore off last week   Reports GI distress from taking to many motrin      Prior note:   4/week HA - L vertex   Jabs - vertex either sides      She reports migraine that woke her up in the morning - associated with L side facial droop      Prior note:   She gets acceptable relief for 2.5 months after BOTOX      Prior note:   No recurrent stroke symptoms   starting having whole body tremors since this AM. Took 1 tablet of  lorazepam   Last night had a HA, took trazodone       Admit Date: 4/11/2018  2:03 PM   Discharge Date and Time: 4/12/2018  8:30 PM   History of Present Illness: Ms. Chawla is a 53 yo F with afib on Eliquis (last dose this am), prior cardiac arrest with associated acute ischemic stroke with residual mild L sided weakness, CAD with ICD in situ, HTN, and HLD who presented with acute R sided weakness and sensation changes.  She originally called EMS for palpitations, but developed acute right sided facial droop and RUE weakness at 1:40pm today, after EMS had arrived.  She denies changes to speech or vision.  SBP en route 200/100.  She is ineligible for tPA 2/2 Eliquis use.  She is not suspected to have an LVO.  She will be admitted to [facility] for observation overnight.     Hospital Course (synopsis of major diagnoses, care, treatment, and services provided during the course of the hospital stay): Ms. Chawla was admitted for acute stroke workup. Pt with pacemaker so unable to obtain MRI Brain; CTA H/N demonstrated no evidence acute infarction, though did exhibit noncalcified plaquing of carotid bifurcations and proximal ICAs with < 50% stenosis, so Plavix was added to pt's daily home regimen. Concerned for TIA at this time though Migraine very high on differential due to pt's hx headaches, including presently this admission. Hypertensive urgency/emergency also considered due to pt's very elevated levels with EMS. Per chart review, Eliquis was a new medication since 4/3/18 (had switched from Coumadin), however staff Faraz concerned that pt had a potential event while on Eliquis. She wished to switch to Pradaxa as an alternative DOAC (as it has an option for reversal), however changed to Xarelto per pt's insurance coverage.   While admitted, pt given cocktail for HA which she has received previously at the infusion clinic - IV Magnesium, IM Toradol, 2mg IV Morphine, 500cc NS bolus (IV Benadryl not included this time as pt  had received a dose earlier that day prior to contrast imaging.) HA improved somewhat and pt was encouraged to follow up with Dr. Cortez for continued management of botox injections, etc. At that time, pt also found with hyponatremia; d/c'd Clorthalidone and plan was made for pt to [facility] 4[facility]16/18 for repeat CMP to evaluate Na level and BP check since discontinuing this medication.  Ms. Chawla was evaluated and treated by PT, OT, and SLP who recommend d/c with outpatient PT and OT. She was discharged home 4/12/18 with [facility] follow-up Monday      Prior note:   2 weeks ago BOTOX effect wore off   Used up all her percocet   3 wks h/o:   Reports frequent falls - falling forward, no LOC, no injuries, able to protect falls by hands   triggers - unknown   Also occ stumbling   Dragging L foot   No Pain in back or legs   No numbness or weakness in legs   No B/B incontinence   No lightheadedness      Prior note:    Flare up of TMJ and HA during daughter's wedding   NSAIDS helped   2 weeks ago BOTOX effect wore off      Prior note:   2 weeks ago BOTOX effect wore off   Has severe spasm and pain in the traps catalina   Weather change has provoked severe migraine + nausea   She started coumadin       Prior note:   3 weeks ago BOTOX effect wore off   She reports severe daily HA    During BOTOX periods she feels she  her HA. Much less intense      Prior note:   The patient is a 51-year-old female who [facility]. Since her last visit, she reports growing frustration   about the fact that nothing has helped her with regards to her headaches. She   continues to experience daily headaches. She takes mefenamic acid and   tizanidine every night, which only provides her two hours of relief. She is   unable to sleep because of the refractory headaches. She is incapacitated   because of severe headaches. She reports minimal relief with infusions  "that she  gets on a periodic basis. She does report an improvement overall with the   Botox. However, this effect has worn off in the last two weeks. She also   reports an episode wherein she fell with loss of consciousness, which was not   provoked. As a result, she injured her ankle and is currently wearing a boot.      Prior note:   since last week - Reports vertigo for 6 - 7 minutes upto 3 times daily   Nearly daily severe HA - no response to ponstel , compazine   She is late for her BOTOX - last 2 weeks were painful       Follow up:   R sided Headache is constant max at TMJ on R   Prior note:   She reports new episodes of R face tingling. Sh also reports 2 episodes of blurred vision lasting 15 minutes. These hapended while watching TV. Her pain remains unchanged   Follow up:   2 days of severe HA during Thanksgiving   Pounding bifrontal region   Pain worse over L eye brow   Follow up:   Reports bifrontal severe HA (worse on L) with no nausea with sudden onset . Occurs daily   NO note:  I found no papilledema or other changes to suggest elevated intracranial pressure or other alternative etiology for her headaches. Repeat exam in two years.  HA are less frequent and less intense.   (R levator scapula spasm)   symptoms better with lateral flexion to L   Prior note:   She still has daily HA with severe sharp stabbing pain behind L eye lasting for 15 sec   Prior note:   Daily pain. 2/week - nausea.  Shu Lares RN at 9/17/2014 4:53 PM  Pt presented to clinic for medical advice. Pt is seen by Dr. Paredes and Dr. Cortez.  1) She went ER on 9/13/14 for a migraine. She was given Reglan, Benadryl, MSO4 and IVF. A couple days later she developed an all over body "tremor". She states "I think I have Parkinson's". - Per Dr. Paredes, medication related tremor (Reglan & Benadryl). Informed her it should wear off in a few days. If not, she is to call and let us know.  2) Headaches - triggered by insomnia.   Current " "meds:  Ketoprofen - she took it once and said her "throat closed up" and she's not supposed to have anything with aspirin in it.  Nortriptyline - she was taking it at night, but it didn't help her sleep, so she stopped it.  Per Dr. Cortez, discontinue Ketoprofen and Nortriptyline. Start Effexor XR 37.5mg QD, tizanidine 4mg BID PRN headache and Klonopin 0.25 - 0.5mg QHS PRN. Will schedule pt for sphenocath procedure within the next week.   Prior note:   48 yo woman with history of HTN and asthma, both reportedly controlled, in hospital early 2013 after "passed out" and became unresponsive. CPR in the field for 8 minutes until EMS arrived. Per ED note, on arrival she was found to be in PEA, given epinephrine. Vfib/Vtach was achieved which was shocked x1. Patient converted to sinus tachycardia after shock. I do not know the time course for the above treatment. On arrival to facility patient was in sinus tachycardia with strong pulses, intubated and unresponsive. ET tube placement was confirmed with direct laryngoscopy and good bilateral breath sounds were heard after the tube was pulled back 2 cm. Reported to have "withdrawal" movements in ER during stabilization, then sedated and cooling protocol initiated. Had remarkable   recovery and first seen in clinic in April 2013.   Headache history: cluster   Age of onset - 2012   Location - behind L eye   Nature of pain - sharp   Prodrome - no   Aura - No   Duration of headache - 6-7 min   Time to peak intensity -   Pain scale - range of intensity - 9/10   Frequency - daily   Status Migrainosus history - 1-3 days   Time of day of most headaches- anytime   Associated symptoms with the headache:   Red eyes   swelling of L face   Headache history: Migraine   Age of onset - 2012   Location - bifrontal   Nature of pain - Pounding   Prodrome - no   Aura - No   Duration of headache - > 4 hrs   Time to peak intensity -   Pain scale - range of intensity - 9/10   Frequency - 5/week " "  Status Migrainosus history - 1-3 days   Time of day of most headaches- anytime   Associated symptoms with the headache:   Meningeal symptoms - photophobia, phonophobia, exercise intolerance +   Nausea/vomitting +   Nasal drainage   Visual blurriness +   Pallor/flushing   Dizziness   Vertigo   Confusion   Impaired concentration +   Pain worsened with physical activity +   Neck pain +   Symptoms of increased intracranial pressure:   Whooshing sounds - no   Visual spots/blotches - no   Headache Triggers: unknown   Other comorbid conditions:   Anxiety - no   Motion sickness symptom - no       Treatment history:   Resolution of headache with sleep - yes       Meds tried:   Trigger points involvin/9/15 helped for 1 day   Site: Right   Levator scapula   Pharmacological agent:   0.5% Sensorcaine solution   No complications were noted.  Supraorbital block - helped for 2 days   Tylenol - not help   imitrex - chest tightness   alleve - help   topamax - not helping   Neurontin - not helping   Paxil, Elavil - not help   seroquel - nightmare   Ketoprofen - she took it once and said her "throat closed up" and she's not supposed to have anything with aspirin in it.  Flurbiprofen - constipation   Nortriptyline - she was taking it at night, but it didn't help her sleep, so she stopped it.  reglan - parkinsonism   zanaflex - help   Toradol 30 mg IM inj at home - too expensive   BOTOX round #1 9/3/15 - helped (R levator scapula spasm)   Round #2 12/3/15 - helped   Round#3 3/316 - helped   Round #4 16 - helped   BOTOX#5 9/15/16 - helped     BOTOX#6 - 16 - helped   BOTOX#7 - 3/9/17 - helped    BOTOX#8 - 17 - helped    BOTOX#9 8/10/17 - helped   BOTOX#10 10/19/17 - helped   BOTOX#11 17 - helped   BOTOX#12 3/7/18 - helped   BOTOX#13 18 - helped    BOTOX#14 18 - helped     BOTOX#15 10/19/18 - helped      BOTOX#16 18 - helped   BOTOX#17 3/13/19 - helped    BOTOX#18 19 - helped     BOTOX#19 " 8/1/19 - helped      BOTOX#20 10/11/19 - helped     BOTOX#21 12/19/19 - helped   BOTOX#22 3/10/20 - helped    BOTOX#23 6/26/20 - helped   BOTOX#24 11/12/20 - helped  BOTOX#25 1/21/21 - helped   BOTOX#26 4/22/21 - helped   BOTOX#27 7/6/21 - helped    BOTOX#28 12/10/21 - helped     BOTOX#29 5/19/22 - helped   BOTOX#30 8/25/22 - helped  BOTOX#31 12/2/22 - helped  BOTOX#32 3/1/23 - helped  BOTOX#33 7/6/23 - helped  BOTOX#34 9/28/23 - helped  BOTOX#35 1/4/24 - helped  BOTOX#36 5/1/24 - helped  BOTOX#37 7/18/24 - helped    AIMOVIG (sept 1rst week, Oct first week, Nov first wk 140 mg, Dec first wk 140 mg, Jan, Feb) no benefit   Constipation +      Can not do RFA - pt has pacemaker   Procedure: IOVERA peripheral nerve focus cold therapy (non ablative cryotherapy) 12/30/15 - not help   Indication: Cryotherapy of select peripheral nerves of the head was performed to treat chronic headaches that failed to respond to several preventive pharmacological therapies.   Sedation: None   Informed consent: The procedure, risks, benefits and options were discussed with the patient. There are no contraindications to the procedure. The patient expressed understanding and agreed to the procedure. I verify that I personally obtained the patient's consent prior to the start of the procedure.  Location:  Bilateral Supra orbital nerve (3 cycles on R and 1 cycle on L)   Bilateral Occipital nerve   3 cycles   Pain relief: Pain score reduced 10/10->0/10   9/26 Bilateral Sphenopalatine Ganglion and bilateral Maxillary Branch (Trigeminal Nerve) Block - no help   ONB - not help     georges Pagan RN at 12/31/2014 3:54 PM                           Status: Signed                                       Expand All Collapse All   HEADACHE FASTTRACK  PAIN 7/10 NAUSEA 0/10  ROUND 1: TORADOL, IMITREX, MORPHINE, BENADRYL, AND MAGNESIUM  PAIN 7/10 NAUSEA 0/10  PT STATED SHE WAS READY TO GO HOME AND GO TO SLEEP. PT D/C'ED IN [facility]  VICTORIANO Pagan RN at 10/5/2015 12:49 PM                           Status: Signed                                       Expand All Collapse All   HEADACHE FASTTRACK  PAIN 8/10 NAUSEA 10/10  FIRST ROUND: tORADOL, BENADRYL, COMPAZINE, IMITREX, MAGNESIUM, AND VALPROATE.  PAIN 1/10 NAUSEA 0/10  PT READY TO GO HOME AND FEELING BETTER. PT LEFT IN NAD WITH FAMILY MEMBER  TOTAL TIME: [id number]                         Shannon Pagan RN at 10/20/2015 3:05 PM                           Status: Signed                                       Expand All Collapse All   HEADACHE FASTTRACK  PAIN 10/10 NAUSEA 0/10  FIRST ROUND: tORADOL, BENADRYL, COMPAZINE, IMITREX, MAGNESIUM, AND VALPROATE.  BP BEING MONITORED EVERY 15 MIN AND REMAINED 170'S/80-90'S. DR CHOPRA NOTIFIED AND STATED TO MAKE SURE BP DID NOT EXCEED 180 SYSTOLIC OR PT SHOULD REPORT TO ED. BP AT 1500 183/92. DR CHOPRA NOTIFIED AND GAVE ORDER TO STOP INFUSION AND SEND PATIENT TO ED. PT BROUGHT TO ED BY INFUSION NURSE VIA WHEELCHAIR.   PAIN 8/10 NAUSEA 0/10  TOTAL TIME: 9568-0419                                                     Progress Notes                                               Shannon Pagan RN at 2/24/2016 1:36 PM       Status: Signed                  Expand All Collapse All   HEADACHE FASTTRACK  PAIN 10/10 NAUSEA 7/10  FIRST ROUND: tORADOL, BENADRYL, AND MORPHINE-BP RANGING FROM 177-203/84-92, HR 60-82  MD NOTIFIED AND GAVE ORDERS TO SEND TO ED. PT LEFT VIA W/C WITH INFUSION NURSE ON WAY TO ED.            Headache risk factors:   H/o TBI - [facility] (2013) + neck sprain   H/o Meningitis - no   F/h Aneurysms - no       Review of Systems   Constitutional: Negative.   HENT: Negative.   Eyes: Negative.   Respiratory: Negative.   Cardiovascular: Negative.   Gastrointestinal: Negative.   Endocrine: Negative.   Genitourinary: Negative.   Musculoskeletal: Negative.   Skin: Negative.   Allergic/Immunologic: Negative.   Hematological: Negative.   Psychiatric/Behavioral: insomnia       Objective:     Physical Exam   Constitutional: She is oriented to person, place, and time. She appears well-developed.   obesity   Psychiatric: She has a normal mood and flat affect. Her behavior is normal. Judgment and thought content normal.   Headache Exam:  Optic disc is flat OU   Temples appear symmetric  No V1,V2,V3 distribution allodynia/hyperalgesia catalina   No facial swelling  No gross TMJ abnormalities   No ptosis   No conj injection   No meningismus   No neck stiffness  No C spine tenderness  Cervical facet tenderness on palpation catalina   No tender points over trapezium   catalina supraorbital nerve exit point tenderness  catalina occipital nerve exit point tenderness  No auriculotemporal nerve tenderness   Tenderness of levator scapula catalina    Gait - wide based gait                       Assessment:                              1.  Occipital neuralgia                              2.  Cervicalgia                              3.  4  5  6 Chronic migraine without aura, with intractable migraine, so stated, with status migrainosus (post traumatic) post concussive?  Depression   TMJ - R sided   Insomnia - SHIRA      Head CT  Findings: There is no evidence of acute or recent major vascular territory cerebral infarction, parenchymal hemorrhage, or intra-axial mass.  There are no extra-axial masses or abnormal fluid collections.  The ventricular system is within normal limits of size for age and shows no distortion by mass-effect or evidence of hydrocephalus.  There is no fracture or destructive osseous lesion.  The extracranial soft tissues are unremarkable.  The visualized paranasal sinuses and mastoid air cells are clear.   Impression    No acute intracranial abnormality.  ______________________________________     Electronically signed by resident: KRISSY MAYERS MD  Date: 03/14/16  Time: 17:09            Plan:                              1. Prophylactic medication -   Despiramine 25 mg AM (for dizziness) PRN  zoloft 25 mg  daily    Aricept 20 mg daily   Neurontin 900 mg TID    Magnesium 200 mg daily  Topamax 200 mg BID   Clonazepam BID for anxiety PRN  On trazodone   Cont B2, B6 supplements  On bellsomra    2, Breakthrough headache - zanaflex 8 mg Q8, etodolac - insurance will not pay for no clinical reason   Diclofenac   Multiple alternative treatment options were offered to the patient   3. Refrain from over counter pain medications. Discussed medication overuse headache.cont Magnesium 400 mg PO BID   4. Occipital neuralgia - If medical management is ineffective may consider occipital nerve blocks.   5. I again urged the patient to keep a headache calender.    f/up Dr. Rock for TBI   Vit D supplements    f/up sleep clinic - CPAP - waiting for new machine   Cont AJOVY (April 2019- ) - gap Feb-April 2021)  No side effects     Consider  ONB 2 weeks prior to next BOTOX       BOTOX treatment response:   Prior to initiating BOTOX, the patient had 28   migraine days per month on average. This meets criteria for chronic migraine.   After starting BOTOX, the patient experienced a reduction in  25  days per month   After starting BOTOX, the patient experienced a reduction in > 100 hours of migraine symptoms per month   After starting BOTOX, the patient experienced a 50% reduction in burden of migraine days per month   Based on this information, BOTOX is medically necessary for the management of the patient's chronic migraine.     BOTOX Injection intervals:   Patient reports a 'wearing off effect' prior to the subsequent BOTOX injections at 12 weeks. This occurs at week 9-10  Symptoms of this a 'wearing off effect' include - worsening of migraine headache frequency and intensity   Medications used during the 'wearing off effect' period include flurbiprofen, zanaflex  Based on this information, it is medically necessary for the patient to receive BOTOX therapy at an interval of every 10 weeks for the management of chronic migraine.    BOTOX  was performed as an indicated therapy for intractable chronic migraine headaches given that the patient failed > 5 prophylactic medications  Botulinum Toxin Injection Procedure   Pre-operative diagnosis: Chronic migraine   Post-operative diagnosis: Same   Procedure: Chemical neurolysis   After risks and benefits were explained including bleeding, infection, worsening of pain, damage to the areas being injected, weakness of muscles, loss of muscle control, dysphagia if injecting the head or neck, facial droop if injecting the facial area, painful injection, allergic or other reaction to the medications being injected, and the failure of the procedure to help the problem, a signed consent was obtained.   The patient was placed in a comfortable area and the sites to be treated were identified.The area to be treated was prepped three times with alcohol and the alcohol allowed to dry. Next, a 30 gauge needle was used to inject the medication in the area to be treated.   Area(s) injected:   Total Botox used: 155 Units   Botox wastage: 45 Units   Injection sites:     muscle bilaterally ( a total of 5 units divided into 2 sites)   Procerus muscle (5 units)   Frontalis muscle bilaterally (a total of 20 units divided into 4 sites)   Temporalis muscle bilaterally (a total of 50 units divided into 8 sites)   Occipitalis muscle bilaterally (a total of 30 units divided into 6 sites)   Cervical paraspinal muscles (a total of 20 units divided into 4 sites)   Trapezius muscle bilaterally (a total of 30 units divided into 6 sites)     Complications: none       RTC for the next Botox injection: 10 weeks    Procedures

## 2024-10-10 NOTE — TELEPHONE ENCOUNTER
Pt requested a call  to notify us she will be late for her botox apt today. Stated she had to  her daughter so she can get her granddaughter so she can make it here.

## 2024-10-17 NOTE — TELEPHONE ENCOUNTER
Spoke to pt and scheduled appt for tomorrow with Dr. Mina   [facility]

## 2024-10-18 LAB
OHS CV AF BURDEN PERCENT: < 1
OHS CV BIV PACING PERCENT: 93 %
OHS CV DC REMOTE DEVICE TYPE: NORMAL
OHS CV RV PACING PERCENT: 90 %

## 2024-10-30 NOTE — ANESTHESIA PROCEDURE NOTES
Arterial    Diagnosis: pacemaker lead failure    Patient location during procedure: done in OR  Procedure start time: 12/7/2020 7:45 AM  Procedure end time: 12/7/2020 7:48 AM    Staffing  Authorizing Provider: Rob Schroeder MD  Performing Provider: Rob Schroeder MD    Anesthesiologist was present at the time of the procedure.    Preanesthetic Checklist  Completed: patient identified, site marked, surgical consent, pre-op evaluation, timeout performed, IV checked, risks and benefits discussed, monitors and equipment checked and anesthesia consent givenArterial  Skin Prep: chlorhexidine gluconate  Local Infiltration: none  Orientation: right  Location: radial  Catheter Size: 20 G  Catheter placement by Anatomical landmarks. Heme positive aspiration all ports.Insertion Attempts: 1  Assessment  Dressing: secured with tape and tegaderm  Patient: Tolerated well             BMI: BMI (kg/m2): 18.7 (10-22-24 @ 22:07)  HbA1c: A1C with Estimated Average Glucose Result: 4.9 % (10-23-24 @ 10:12)    Glucose:   BP: --Vital Signs Last 24 Hrs  T(C): 36.6 (10-30-24 @ 08:19), Max: 37.2 (10-29-24 @ 20:26)  T(F): 97.9 (10-30-24 @ 08:19), Max: 99 (10-29-24 @ 20:26)  HR: --  BP: --  BP(mean): --  RR: --  SpO2: --    Orthostatic VS  10-30-24 @ 08:19  Lying BP: --/-- HR: --  Sitting BP: 112/71 HR: 103  Standing BP: 108/74 HR: 94  Site: upper right arm  Mode: electronic  Orthostatic VS  10-29-24 @ 08:39  Lying BP: --/-- HR: --  Sitting BP: 118/75 HR: 92  Standing BP: 115/79 HR: 102  Site: --  Mode: --  Orthostatic VS  10-28-24 @ 19:41  Lying BP: --/-- HR: --  Sitting BP: 110/71 HR: 90  Standing BP: 128/75 HR: 100  Site: --  Mode: --    Lipid Panel: Date/Time: 10-23-24 @ 10:12  Cholesterol, Serum: 195  LDL Cholesterol Calculated: 127  HDL Cholesterol, Serum: 50  Total Cholesterol/HDL Ration Measurement: --  Triglycerides, Serum: 92

## 2024-10-31 ENCOUNTER — EXTERNAL CHRONIC CARE MANAGEMENT (OUTPATIENT)
Dept: PRIMARY CARE CLINIC | Facility: CLINIC | Age: 59
End: 2024-10-31
Payer: MEDICARE

## 2024-10-31 PROCEDURE — 99490 CHRNC CARE MGMT STAFF 1ST 20: CPT | Mod: S$GLB,,, | Performed by: INTERNAL MEDICINE

## 2024-11-04 ENCOUNTER — PATIENT MESSAGE (OUTPATIENT)
Dept: INTERNAL MEDICINE | Facility: CLINIC | Age: 59
End: 2024-11-04
Payer: MEDICARE

## 2024-11-05 ENCOUNTER — OFFICE VISIT (OUTPATIENT)
Dept: INTERNAL MEDICINE | Facility: CLINIC | Age: 59
End: 2024-11-05
Payer: MEDICARE

## 2024-11-05 VITALS
SYSTOLIC BLOOD PRESSURE: 132 MMHG | DIASTOLIC BLOOD PRESSURE: 64 MMHG | BODY MASS INDEX: 50.02 KG/M2 | OXYGEN SATURATION: 97 % | HEART RATE: 65 BPM | WEIGHT: 293 LBS | HEIGHT: 64 IN

## 2024-11-05 DIAGNOSIS — R05.9 COUGH, UNSPECIFIED TYPE: ICD-10-CM

## 2024-11-05 DIAGNOSIS — J01.00 ACUTE NON-RECURRENT MAXILLARY SINUSITIS: Primary | ICD-10-CM

## 2024-11-05 PROCEDURE — 99999 PR PBB SHADOW E&M-EST. PATIENT-LVL V: CPT | Mod: PBBFAC,,, | Performed by: NURSE PRACTITIONER

## 2024-11-05 PROCEDURE — 1159F MED LIST DOCD IN RCRD: CPT | Mod: CPTII,S$GLB,, | Performed by: NURSE PRACTITIONER

## 2024-11-05 PROCEDURE — 3008F BODY MASS INDEX DOCD: CPT | Mod: CPTII,S$GLB,, | Performed by: NURSE PRACTITIONER

## 2024-11-05 PROCEDURE — 3078F DIAST BP <80 MM HG: CPT | Mod: CPTII,S$GLB,, | Performed by: NURSE PRACTITIONER

## 2024-11-05 PROCEDURE — 99213 OFFICE O/P EST LOW 20 MIN: CPT | Mod: S$GLB,,, | Performed by: NURSE PRACTITIONER

## 2024-11-05 PROCEDURE — 4010F ACE/ARB THERAPY RXD/TAKEN: CPT | Mod: CPTII,S$GLB,, | Performed by: NURSE PRACTITIONER

## 2024-11-05 PROCEDURE — 3044F HG A1C LEVEL LT 7.0%: CPT | Mod: CPTII,S$GLB,, | Performed by: NURSE PRACTITIONER

## 2024-11-05 PROCEDURE — 3075F SYST BP GE 130 - 139MM HG: CPT | Mod: CPTII,S$GLB,, | Performed by: NURSE PRACTITIONER

## 2024-11-05 RX ORDER — FLUTICASONE PROPIONATE 50 MCG
1 SPRAY, SUSPENSION (ML) NASAL DAILY
Qty: 9.9 ML | Refills: 0 | Status: SHIPPED | OUTPATIENT
Start: 2024-11-05

## 2024-11-05 RX ORDER — CETIRIZINE HYDROCHLORIDE 10 MG/1
10 TABLET ORAL DAILY
Start: 2024-11-05 | End: 2024-11-12

## 2024-11-05 RX ORDER — ALBUTEROL SULFATE 90 UG/1
2 INHALANT RESPIRATORY (INHALATION) EVERY 6 HOURS PRN
Qty: 18 G | Refills: 0 | Status: SHIPPED | OUTPATIENT
Start: 2024-11-05 | End: 2025-11-05

## 2024-11-05 RX ORDER — PROMETHAZINE HYDROCHLORIDE AND DEXTROMETHORPHAN HYDROBROMIDE 6.25; 15 MG/5ML; MG/5ML
5 SYRUP ORAL NIGHTLY PRN
Qty: 118 ML | Refills: 0 | Status: SHIPPED | OUTPATIENT
Start: 2024-11-05 | End: 2024-11-15

## 2024-11-05 RX ORDER — BENZONATATE 100 MG/1
100 CAPSULE ORAL 3 TIMES DAILY PRN
Qty: 30 CAPSULE | Refills: 0 | Status: SHIPPED | OUTPATIENT
Start: 2024-11-05 | End: 2024-11-15

## 2024-11-05 RX ORDER — AZITHROMYCIN 250 MG/1
TABLET, FILM COATED ORAL
Qty: 6 TABLET | Refills: 0 | OUTPATIENT
Start: 2024-11-05 | End: 2024-11-07

## 2024-11-05 NOTE — PROGRESS NOTES
INTERNAL MEDICINE PROGRESS/URGENT CARE NOTE    CHIEF COMPLAINT     Chief Complaint   Patient presents with    Cough    Shortness of Breath       HPI     Amna Chawla is a 59 y.o. female who presents for an urgent/follow up visit today.    Started 5 days with cough, congestion. Had fever 100.0 the first day, but nothing since. +wheezing  for a few days only. Feels sob when she coughs too much.   Productive of green sputum.   No MERCHANT, cp, sob at rest.   Cough drops, benadryl.   +fatigue  - covid test       Patient Active Problem List   Diagnosis    Complete AV block    Mixed hyperlipidemia    History of CVA (cerebrovascular accident)    Knee pain    Hiatal hernia    GERD (gastroesophageal reflux disease)    Occipital neuralgia    Chronic migraine without aura, with intractable migraine, so stated, with status migrainosus    History of sudden cardiac arrest    Biventricular automatic implantable cardioverter defibrillator in situ    Left-sided headache    Essential hypertension    Supraorbital neuralgia    Anxiety    Keratoconjunctivitis sicca of both eyes not specified as Sjogren's    Right carpal tunnel syndrome    Impaired mobility and ADLs    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    Headaches due to old head injury    Decreased ROM of neck    Bilateral carpal tunnel syndrome    Paroxysmal atrial fibrillation    Long term (current) use of anticoagulants    Impaired functional mobility, balance, gait, and endurance    Obstructive sleep apnea    Muscle weakness of lower extremity    Nonischemic cardiomyopathy from RV pacing, resolved with upgrade cardiac resynchronization therapy    History of DVT (deep vein thrombosis)    Depression, major, recurrent, moderate    Insomnia    Left atrial enlargement    History of TIA (transient ischemic attack)    Thyroid cyst    Hemispheric carotid artery syndrome    Thyroid nodule    Anemia due to vitamin B12 deficiency    Vitamin D deficiency    Trigger  middle finger of left hand    Prediabetes    B12 deficiency    Acquired trigger finger of right middle finger    Leg edema    Non-compliance    Chronic fatigue    Acute pain of left knee    Decreased range of motion of left knee    Headache    Mild neurocognitive disorder due to multiple etiologies    Epistaxis    Class 3 severe obesity due to excess calories with serious comorbidity and body mass index (BMI) of 50.0 to 59.9 in adult    Non-cardiac chest pain    Grief    Left leg weakness    History of stroke    Neutropenia, unspecified type    Aortic atherosclerosis    Hemiparesis affecting left side as late effect of stroke    Empty sella        Past Medical History:  Past Medical History:   Diagnosis Date    Anticoagulant long-term use     Anxiety     Asthma     Atrial fibrillation     Brain anoxic injury     Cervicalgia 8/28/2014    CHI (closed head injury) 2/19/2013    Convulsion 5/30/2015    Decreased ROM of left shoulder 4/12/2017    Defibrillator activation 2013    Depression     Heart block     History of sudden cardiac arrest 2/2013    PEA arrest with subsequent long-QT    Hx of psychiatric care     Hypertension     Left atrial enlargement 4/11/2018    Pacemaker 2013    Paresthesia 11/1/2013    Prolonged Q-T interval on ECG 2/8/2013    Psychiatric problem     Seizures     Sleep difficulties     Stroke     weakness lt side    Therapy     Thyroid disease     Upper airway resistance syndrome 2/21/2017        Past Surgical History:  Past Surgical History:   Procedure Laterality Date    breast reduction      CARDIAC DEFIBRILLATOR PLACEMENT      CARDIAC DEFIBRILLATOR PLACEMENT      CARPAL TUNNEL RELEASE Right     COLONOSCOPY N/A 5/7/2019    Procedure: COLONOSCOPY;  Surgeon: Juan Coffey MD;  Location: [facility] ([address] [place] [address]);  Service: Endoscopy;  Laterality: N/A;  per DR. Bustamante Pt to have balloon with DR. Coffey due to excesive looping, could not reach cecum - see telephone encounter 3/8/19 and last procedure  report dated 6/24/14/ Per Piedad schedule as 90 min case- ERW  pacemaker/AICD Boitronik/   per , ok to hold Xareltox 2 days    COLONOSCOPY N/A 6/2/2022    Procedure: COLONOSCOPY;  Surgeon: Juan Coffey MD;  Location: [facility] ENDO (2ND FLR);  Service: Endoscopy;  Laterality: N/A;  combined orders    ESOPHAGOGASTRODUODENOSCOPY N/A 6/2/2022    Procedure: EGD (ESOPHAGOGASTRODUODENOSCOPY);  Surgeon: Juan Coffey MD;  Location: [facility] ENDO (2ND FLR);  Service: Endoscopy;  Laterality: N/A;  BMI 54; pt requests 1st available [facility]  Fully vaccinated, Hold Xarelto x 2 days per Dr. Mejia and Plavix x 5 days per Dr. Grossman see telephone encounter 4/25/22, Biotronik PM/AICD, prep instr portal and mailed -ml    HERNIA REPAIR      HIATAL HERNIA REPAIR      INSERT / REPLACE / REMOVE PACEMAKER  10/2017    CRT-D upgrade    REVISION OF IMPLANTABLE CARDIOVERTER-DEFIBRILLATOR (ICD) ELECTRODE LEAD PLACEMENT Left 12/7/2020    Procedure: REVISION, INSERTION, ELECTRODE LEAD, ICD;  Surgeon: Avelino Mejia MD;  Location: [facility] EP LAB;  Service: Cardiology;  Laterality: Left;    REVISION OF SKIN POCKET FOR CARDIOVERTER-DEFIBRILLATOR  12/7/2020    Procedure: Revision, Skin Pocket, For Cardioverter-Defibrillator;  Surgeon: Avelino Mejia MD;  Location: [facility] EP LAB;  Service: Cardiology;;    TOTAL REDUCTION MAMMOPLASTY      TRANSESOPHAGEAL ECHOCARDIOGRAPHY N/A 12/7/2020    Procedure: ECHOCARDIOGRAM, TRANSESOPHAGEAL;  Surgeon: Ivory Goodman MD;  Location: [facility] EP LAB;  Service: Cardiology;  Laterality: N/A;    TRANSESOPHAGEAL ECHOCARDIOGRAPHY N/A 12/7/2020    Procedure: ECHOCARDIOGRAM, TRANSESOPHAGEAL;  Surgeon: Dosc Diagnostic Provider;  Location: [facility] 2ND FLR;  Service: Cardiology;  Laterality: N/A;    TRIGGER FINGER RELEASE Left 8/2/2019    Procedure: RELEASE, TRIGGER FINGER left middle;  Surgeon: Matt Pugh Jr., MD;  Location: [facility];  Service: Plastics;  Laterality: Left;    TRIGGER FINGER RELEASE Right 2/11/2020     Procedure: RELEASE, TRIGGER FINGER middle;  Surgeon: Matt Pugh Jr., MD;  Location: [facility];  Service: Plastics;  Laterality: Right;    TUBAL LIGATION          Allergies:  Review of patient's allergies indicates:   Allergen Reactions    Aspirin Hives    Imitrex [sumatriptan] Palpitations    Penicillins Hives and Swelling    Shellfish containing products Anaphylaxis     seafood    Reglan [metoclopramide hcl] Other (See Comments)     Parkinsonism        Home Medications:    Current Outpatient Medications:     amLODIPine (NORVASC) 5 MG tablet, Take 1 tablet (5 mg total) by mouth once daily., Disp: 90 tablet, Rfl: 3    ARIPiprazole (ABILIFY) 15 MG Tab, Take 1 tablet (15 mg total) by mouth once daily., Disp: 90 tablet, Rfl: 3    carvediloL (COREG) 25 MG tablet, Take 2 tablets (50 mg total) by mouth 2 (two) times daily with meals., Disp: 360 tablet, Rfl: 3    clonazePAM (KLONOPIN) 1 MG tablet, TAKE 1 TABLET BY MOUTH DAILY AS NEEDED FOR ANXIETY, Disp: 30 tablet, Rfl: 5    cyanocobalamin, vitamin B-12, 1,000 mcg Subl, Place 1,000 mcg under the tongue once daily., Disp: 90 tablet, Rfl: 3    donepezil (ARICEPT) 10 MG tablet, Take 1 tablet (10 mg total) by mouth 2 (two) times daily., Disp: 180 tablet, Rfl: 3    flurbiprofen (ANSAID) 100 MG tablet, Take 1 tablet (100 mg total) by mouth 2 (two) times daily as needed (migraine)., Disp: 12 tablet, Rfl: 12    fremanezumab-vfrm (AJOVY) 225 mg/1.5 mL injection, Inject 1.5 mLs (225 mg total) into the skin every 28 days., Disp: 1 each, Rfl: 12    gabapentin (NEURONTIN) 300 MG capsule, Take 1 capsule (300 mg total) by mouth 3 (three) times daily., Disp: 270 capsule, Rfl: 1    losartan (COZAAR) 100 MG tablet, Take 1 tablet (100 mg total) by mouth once daily., Disp: 90 tablet, Rfl: 3    magnesium 200 mg Tab, Take 200 mg by mouth once daily., Disp: 30 each, Rfl: 12    omeprazole (PRILOSEC) 40 MG capsule, Take 1 capsule (40 mg total) by mouth once daily. Take 30 minutes before  breakfast, Disp: 90 capsule, Rfl: 3    ondansetron (ZOFRAN-ODT) 4 MG TbDL, DISSOLVE 1 TABLET(4 MG) ON THE TONGUE EVERY 12 HOURS AS NEEDED FOR NAUSEA, Disp: 40 tablet, Rfl: 12    polyethylene glycol (GLYCOLAX) 17 gram PwPk, Take 17 g by mouth once daily., Disp: 20 each, Rfl: 12    rosuvastatin (CRESTOR) 40 MG Tab, Take 1 tablet (40 mg total) by mouth every evening., Disp: 90 tablet, Rfl: 3    sertraline (ZOLOFT) 100 MG tablet, Take 2 tablets (200 mg total) by mouth once daily., Disp: 180 tablet, Rfl: 3    tiaGABine (GABITRIL) 4 MG tablet, Take 1 tablet (4 mg total) by mouth every evening., Disp: 30 tablet, Rfl: 12    tiZANidine (ZANAFLEX) 4 MG tablet, Take 1 tablet (4 mg total) by mouth 3 (three) times daily as needed (muscle pain)., Disp: 40 tablet, Rfl: 12    tiZANidine (ZANAFLEX) 4 MG tablet, Take 1 tablet (4 mg total) by mouth 3 (three) times daily as needed (muscle pain)., Disp: 40 tablet, Rfl: 12    ubrogepant (UBROGEPANT) 100 mg tablet, Take 1 tablet (100 mg total) by mouth 2 (two) times daily as needed for Migraine., Disp: 10 tablet, Rfl: 12    XARELTO 20 mg Tab, TAKE 1 TABLET BY MOUTH DAILY EVENING MEAL WITH DINNER, Disp: 30 tablet, Rfl: 11    zolpidem (AMBIEN) 10 mg Tab, Take 1 tablet (10 mg total) by mouth nightly as needed (insomnia)., Disp: 30 tablet, Rfl: 5    albuterol (VENTOLIN HFA) 90 mcg/actuation inhaler, Inhale 2 puffs into the lungs every 6 (six) hours as needed for Wheezing. Rescue, Disp: 18 g, Rfl: 0    azithromycin (Z-ROMY) 250 MG tablet, Take 2 tablets by mouth on day 1; Take 1 tablet by mouth on days 2-5, Disp: 6 tablet, Rfl: 0    benzonatate (TESSALON) 100 MG capsule, Take 1 capsule (100 mg total) by mouth 3 (three) times daily as needed., Disp: 30 capsule, Rfl: 0    cetirizine (ZYRTEC) 10 MG tablet, Take 1 tablet (10 mg total) by mouth once daily. for 7 days, Disp: , Rfl:     EPINEPHrine (EPIPEN) 0.3 mg/0.3 mL AtIn, Inject 0.3 mLs (0.3 mg total) into the muscle once. for 1 dose, Disp: 0.3  mL, Rfl: 1    fluticasone propionate (FLONASE) 50 mcg/actuation nasal spray, 1 spray (50 mcg total) by Each Nostril route once daily., Disp: 9.9 mL, Rfl: 0    furosemide (LASIX) 20 MG tablet, Take 1 tablet (20 mg total) by mouth every other day. (Patient not taking: Reported on 5/1/2024), Disp: 45 tablet, Rfl: 3    promethazine-dextromethorphan (PROMETHAZINE-DM) 6.25-15 mg/5 mL Syrp, Take 5 mLs by mouth nightly as needed (cough)., Disp: 118 mL, Rfl: 0    Current Facility-Administered Medications:     cyanocobalamin tablet 100 mcg, 100 mcg, Oral, 1 time in Clinic/HOD,     onabotulinumtoxina injection 200 Units, 200 Units, Intramuscular, Q90 Days, David Cortez MD, 200 Units at 10/19/18 1713    onabotulinumtoxina injection 200 Units, 200 Units, Intramuscular, Q90 Days, David Cortez MD, 200 Units at 12/28/18 1106    onabotulinumtoxina injection 200 Units, 200 Units, Intramuscular, Q90 Days, David Cortez MD, 200 Units at 03/13/19 1504    onabotulinumtoxina injection 200 Units, 200 Units, Intramuscular, Q90 Days, David Cortez MD, 200 Units at 05/23/19 1123    onabotulinumtoxina injection 200 Units, 200 Units, Intramuscular, Q90 Days, David Cortez MD, 200 Units at 10/11/19 1112    onabotulinumtoxina injection 200 Units, 200 Units, Intramuscular, Q90 Days, David Cortez MD, 200 Units at 12/19/19 1036    onabotulinumtoxina injection 200 Units, 200 Units, Intramuscular, Q10 weeks, David Cortez MD, 200 Units at 07/18/24 1511    onabotulinumtoxina injection 200 Units, 200 Units, Intramuscular, Q90 Days, David Cortez MD, 200 Units at 01/04/24 1349    Facility-Administered Medications Ordered in Other Visits:     lactated ringers infusion, , Intravenous, Continuous, Humberto Angel MD, Stopped at 02/11/20 0801    lidocaine (PF) 10 mg/ml (1%) injection 5 mg, 0.5 mL, Intradermal, Once, Humberto Angel MD     Review of Systems:  Review of Systems   Constitutional:  Positive for fatigue. Negative for fever.  "  HENT:  Positive for congestion, postnasal drip, rhinorrhea, sinus pressure and sinus pain. Negative for ear pain, mouth sores, sore throat and trouble swallowing.    Eyes:  Negative for discharge, redness and itching.   Respiratory:  Positive for cough and wheezing. Negative for shortness of breath and stridor.    Cardiovascular:  Negative for chest pain.   Gastrointestinal:  Negative for abdominal pain, diarrhea, nausea and vomiting.   Neurological:  Positive for headaches. Negative for dizziness and weakness.         PHYSICAL EXAM     Vitals:    11/05/24 1323   BP: 132/64   BP Location: Left forearm   Patient Position: Sitting   Pulse: 65   SpO2: 97%   Weight: 133.6 kg (294 lb 8.6 oz)   Height: 5' 4" (1.626 m)      Body mass index is 50.56 kg/m².     Physical Exam  Vitals reviewed.   Constitutional:       Appearance: Normal appearance. She is not toxic-appearing.   HENT:      Head: Normocephalic and atraumatic.      Right Ear: Tympanic membrane and ear canal normal.      Left Ear: Tympanic membrane and ear canal normal.      Nose: Congestion present.      Mouth/Throat:      Mouth: Mucous membranes are moist.      Pharynx: Oropharynx is clear. Uvula midline. No oropharyngeal exudate, posterior oropharyngeal erythema or uvula swelling.   Eyes:      Conjunctiva/sclera: Conjunctivae normal.      Pupils: Pupils are equal, round, and reactive to light.   Cardiovascular:      Rate and Rhythm: Normal rate and regular rhythm.   Pulmonary:      Effort: Pulmonary effort is normal.      Breath sounds: Normal breath sounds. No wheezing, rhonchi or rales.   Musculoskeletal:      Cervical back: Normal range of motion and neck supple.   Lymphadenopathy:      Cervical: No cervical adenopathy.   Skin:     General: Skin is warm and dry.      Findings: No rash.   Neurological:      General: No focal deficit present.      Mental Status: She is alert.   Psychiatric:         Mood and Affect: Mood normal.         Behavior: Behavior " normal.         LABS     Lab Results   Component Value Date    HGBA1C 6.3 (H) 04/02/2024     CMP  Sodium   Date Value Ref Range Status   04/02/2024 138 136 - 145 mmol/L Final     Potassium   Date Value Ref Range Status   04/02/2024 3.4 (L) 3.5 - 5.1 mmol/L Final     Chloride   Date Value Ref Range Status   04/02/2024 106 95 - 110 mmol/L Final     CO2   Date Value Ref Range Status   04/02/2024 26 23 - 29 mmol/L Final     Glucose   Date Value Ref Range Status   04/02/2024 103 70 - 110 mg/dL Final     BUN   Date Value Ref Range Status   04/02/2024 12 6 - 20 mg/dL Final     Creatinine   Date Value Ref Range Status   04/02/2024 0.9 0.5 - 1.4 mg/dL Final     Calcium   Date Value Ref Range Status   04/02/2024 8.7 8.7 - 10.5 mg/dL Final     Total Protein   Date Value Ref Range Status   04/02/2024 9.3 (H) 6.0 - 8.4 g/dL Final     Albumin   Date Value Ref Range Status   04/02/2024 2.8 (L) 3.5 - 5.2 g/dL Final     Total Bilirubin   Date Value Ref Range Status   04/02/2024 0.2 0.1 - 1.0 mg/dL Final     Comment:     For infants and newborns, interpretation of results should be based  on gestational age, weight and in agreement with clinical  observations.    Premature Infant recommended reference ranges:  Up to 24 hours.............<8.0 mg/dL  Up to 48 hours............<12.0 mg/dL  3-5 days..................<15.0 mg/dL  6-29 days.................<15.0 mg/dL       Alkaline Phosphatase   Date Value Ref Range Status   04/02/2024 59 55 - 135 U/L Final     AST   Date Value Ref Range Status   04/02/2024 10 10 - 40 U/L Final     ALT   Date Value Ref Range Status   04/02/2024 13 10 - 44 U/L Final     Anion Gap   Date Value Ref Range Status   04/02/2024 6 (L) 8 - 16 mmol/L Final     eGFR if    Date Value Ref Range Status   05/17/2022 >60.0 >60 mL/min/1.73 m^2 Final     eGFR if non    Date Value Ref Range Status   05/17/2022 >60.0 >60 mL/min/1.73 m^2 Final     Comment:     Calculation used to obtain the  estimated glomerular filtration  rate (eGFR) is the CKD-EPI equation.        Lab Results   Component Value Date    WBC 3.37 (L) 04/02/2024    HGB 10.1 (L) 04/02/2024    HCT 29.9 (L) 04/02/2024    MCV 90 04/02/2024     04/02/2024     Lab Results   Component Value Date    CHOL 162 04/02/2024    CHOL 97 (L) 05/05/2023    CHOL 126 05/17/2022     Lab Results   Component Value Date    HDL 32 (L) 04/02/2024    HDL 31 (L) 05/05/2023    HDL 38 (L) 05/17/2022     Lab Results   Component Value Date    LDLCALC 108.4 04/02/2024    LDLCALC 52.6 (L) 05/05/2023    LDLCALC 73.0 05/17/2022     Lab Results   Component Value Date    TRIG 108 04/02/2024    TRIG 67 05/05/2023    TRIG 75 05/17/2022     Lab Results   Component Value Date    CHOLHDL 19.8 (L) 04/02/2024    CHOLHDL 32.0 05/05/2023    CHOLHDL 30.2 05/17/2022     Lab Results   Component Value Date    TSH 2.264 04/02/2024    D3LFZTT 141.42 02/07/2013    [id number] 8.4 02/07/2013       ASSESSMENT     1. Acute non-recurrent maxillary sinusitis    2. Cough, unspecified type         PLAN      1. Acute non-recurrent maxillary sinusitis  - cetirizine (ZYRTEC) 10 MG tablet; Take 1 tablet (10 mg total) by mouth once daily. for 7 days  - promethazine-dextromethorphan (PROMETHAZINE-DM) 6.25-15 mg/5 mL Syrp; Take 5 mLs by mouth nightly as needed (cough).  Dispense: 118 mL; Refill: 0  - azithromycin (Z-ROMY) 250 MG tablet; Take 2 tablets by mouth on day 1; Take 1 tablet by mouth on days 2-5  Dispense: 6 tablet; Refill: 0  - albuterol (VENTOLIN HFA) 90 mcg/actuation inhaler; Inhale 2 puffs into the lungs every 6 (six) hours as needed for Wheezing. Rescue  Dispense: 18 g; Refill: 0  - fluticasone propionate (FLONASE) 50 mcg/actuation nasal spray; 1 spray (50 mcg total) by Each Nostril route once daily.  Dispense: 9.9 mL; Refill: 0    2. Cough, unspecified type  - benzonatate (TESSALON) 100 MG capsule; Take 1 capsule (100 mg total) by mouth 3 (three) times daily as needed.  Dispense: 30  capsule; Refill: 0     Rest and hydrate well. F/u if no improvement in the next 2-3 days.       Patient was counseled on when to seek emergent care. Patient's plan/treatment was discussed including medications and possible side effects. Verbalized understanding of all instructions.     This note was partly generated with [email or web address] voice recognition software. I apologize for any possible typographical errors.          LEFTY Joshi  Department of Internal Medicine - [facility]  11/05/2024

## 2024-11-07 ENCOUNTER — HOSPITAL ENCOUNTER (EMERGENCY)
Facility: HOSPITAL | Age: 59
Discharge: HOME | End: 2024-11-07
Attending: EMERGENCY MEDICINE
Payer: MEDICARE

## 2024-11-07 VITALS
OXYGEN SATURATION: 96 % | TEMPERATURE: 98 F | HEART RATE: 63 BPM | WEIGHT: 293 LBS | HEIGHT: 64 IN | RESPIRATION RATE: 19 BRPM | DIASTOLIC BLOOD PRESSURE: 61 MMHG | BODY MASS INDEX: 50.02 KG/M2 | SYSTOLIC BLOOD PRESSURE: 103 MMHG

## 2024-11-07 DIAGNOSIS — R07.89 CHEST DISCOMFORT: ICD-10-CM

## 2024-11-07 DIAGNOSIS — R05.9 COUGH: ICD-10-CM

## 2024-11-07 DIAGNOSIS — J18.9 PNEUMONIA DUE TO INFECTIOUS ORGANISM, UNSPECIFIED LATERALITY, UNSPECIFIED PART OF LUNG: Primary | ICD-10-CM

## 2024-11-07 DIAGNOSIS — J06.9 UPPER RESPIRATORY TRACT INFECTION, UNSPECIFIED TYPE: ICD-10-CM

## 2024-11-07 LAB
ALBUMIN SERPL BCP-MCNC: 2.9 G/DL (ref 3.5–5.2)
ALP SERPL-CCNC: 59 U/L (ref 40–150)
ALT SERPL W/O P-5'-P-CCNC: 11 U/L (ref 10–44)
ANION GAP SERPL CALC-SCNC: 6 MMOL/L (ref 8–16)
AST SERPL-CCNC: 11 U/L (ref 10–40)
BASOPHILS # BLD AUTO: 0 K/UL (ref 0–0.2)
BASOPHILS NFR BLD: 0 % (ref 0–1.9)
BILIRUB SERPL-MCNC: 0.5 MG/DL (ref 0.1–1)
BILIRUB UR QL STRIP: NEGATIVE
BUN SERPL-MCNC: 13 MG/DL (ref 6–20)
CALCIUM SERPL-MCNC: 8.7 MG/DL (ref 8.7–10.5)
CHLORIDE SERPL-SCNC: 106 MMOL/L (ref 95–110)
CLARITY UR REFRACT.AUTO: CLEAR
CO2 SERPL-SCNC: 23 MMOL/L (ref 23–29)
COLOR UR AUTO: YELLOW
CREAT SERPL-MCNC: 0.8 MG/DL (ref 0.5–1.4)
DIFFERENTIAL METHOD BLD: ABNORMAL
EOSINOPHIL # BLD AUTO: 0 K/UL (ref 0–0.5)
EOSINOPHIL NFR BLD: 0.2 % (ref 0–8)
ERYTHROCYTE [DISTWIDTH] IN BLOOD BY AUTOMATED COUNT: 15.3 % (ref 11.5–14.5)
EST. GFR  (NO RACE VARIABLE): >60 ML/MIN/1.73 M^2
GLUCOSE SERPL-MCNC: 113 MG/DL (ref 70–110)
GLUCOSE UR QL STRIP: NEGATIVE
HCT VFR BLD AUTO: 33.3 % (ref 37–48.5)
HCV AB SERPL QL IA: NORMAL
HGB BLD-MCNC: 11.1 G/DL (ref 12–16)
HGB UR QL STRIP: NEGATIVE
HIV 1+2 AB+HIV1 P24 AG SERPL QL IA: NORMAL
IMM GRANULOCYTES # BLD AUTO: 0.01 K/UL (ref 0–0.04)
IMM GRANULOCYTES NFR BLD AUTO: 0.2 % (ref 0–0.5)
INFLUENZA A, MOLECULAR: NOT DETECTED
INFLUENZA B, MOLECULAR: NOT DETECTED
KETONES UR QL STRIP: NEGATIVE
LEUKOCYTE ESTERASE UR QL STRIP: NEGATIVE
LYMPHOCYTES # BLD AUTO: 1.3 K/UL (ref 1–4.8)
LYMPHOCYTES NFR BLD: 21 % (ref 18–48)
MCH RBC QN AUTO: 30.9 PG (ref 27–31)
MCHC RBC AUTO-ENTMCNC: 33.3 G/DL (ref 32–36)
MCV RBC AUTO: 93 FL (ref 82–98)
MONOCYTES # BLD AUTO: 0.5 K/UL (ref 0.3–1)
MONOCYTES NFR BLD: 7.6 % (ref 4–15)
NEUTROPHILS # BLD AUTO: 4.2 K/UL (ref 1.8–7.7)
NEUTROPHILS NFR BLD: 71 % (ref 38–73)
NITRITE UR QL STRIP: NEGATIVE
NRBC BLD-RTO: 0 /100 WBC
OHS QRS DURATION: 138 MS
OHS QTC CALCULATION: 484 MS
PH UR STRIP: 7 [PH] (ref 5–8)
PLATELET # BLD AUTO: 182 K/UL (ref 150–450)
PMV BLD AUTO: 11.2 FL (ref 9.2–12.9)
POTASSIUM SERPL-SCNC: 3.7 MMOL/L (ref 3.5–5.1)
PROT SERPL-MCNC: 9.6 G/DL (ref 6–8.4)
PROT UR QL STRIP: NEGATIVE
RBC # BLD AUTO: 3.59 M/UL (ref 4–5.4)
RSV AG BY MOLECULAR METHOD: DETECTED
SARS-COV-2 RNA RESP QL NAA+PROBE: NOT DETECTED
SODIUM SERPL-SCNC: 135 MMOL/L (ref 136–145)
SP GR UR STRIP: 1.02 (ref 1–1.03)
URN SPEC COLLECT METH UR: NORMAL
WBC # BLD AUTO: 5.95 K/UL (ref 3.9–12.7)

## 2024-11-07 PROCEDURE — 93010 ELECTROCARDIOGRAM REPORT: CPT | Mod: ,,, | Performed by: INTERNAL MEDICINE

## 2024-11-07 PROCEDURE — 93005 ELECTROCARDIOGRAM TRACING: CPT

## 2024-11-07 PROCEDURE — 63600175 PHARM REV CODE 636 W HCPCS

## 2024-11-07 PROCEDURE — 85025 COMPLETE CBC W/AUTO DIFF WBC: CPT

## 2024-11-07 PROCEDURE — 99285 EMERGENCY DEPT VISIT HI MDM: CPT | Mod: 25

## 2024-11-07 PROCEDURE — 86803 HEPATITIS C AB TEST: CPT | Performed by: PHYSICIAN ASSISTANT

## 2024-11-07 PROCEDURE — 25000003 PHARM REV CODE 250

## 2024-11-07 PROCEDURE — 87389 HIV-1 AG W/HIV-1&-2 AB AG IA: CPT | Performed by: PHYSICIAN ASSISTANT

## 2024-11-07 PROCEDURE — 81003 URINALYSIS AUTO W/O SCOPE: CPT

## 2024-11-07 PROCEDURE — 80053 COMPREHEN METABOLIC PANEL: CPT

## 2024-11-07 PROCEDURE — 96375 TX/PRO/DX INJ NEW DRUG ADDON: CPT

## 2024-11-07 PROCEDURE — 96374 THER/PROPH/DIAG INJ IV PUSH: CPT

## 2024-11-07 PROCEDURE — 0241U SARS-COV2 (COVID) WITH FLU/RSV BY PCR: CPT

## 2024-11-07 RX ORDER — CEFTRIAXONE 1 G/1
1 INJECTION, POWDER, FOR SOLUTION INTRAMUSCULAR; INTRAVENOUS
Status: COMPLETED | OUTPATIENT
Start: 2024-11-07 | End: 2024-11-07

## 2024-11-07 RX ORDER — ONDANSETRON HYDROCHLORIDE 2 MG/ML
4 INJECTION, SOLUTION INTRAVENOUS
Status: COMPLETED | OUTPATIENT
Start: 2024-11-07 | End: 2024-11-07

## 2024-11-07 RX ORDER — PROCHLORPERAZINE EDISYLATE 5 MG/ML
5 INJECTION INTRAMUSCULAR; INTRAVENOUS
Status: COMPLETED | OUTPATIENT
Start: 2024-11-07 | End: 2024-11-07

## 2024-11-07 RX ORDER — TIZANIDINE 2 MG/1
4 TABLET ORAL
Status: COMPLETED | OUTPATIENT
Start: 2024-11-07 | End: 2024-11-07

## 2024-11-07 RX ORDER — ACETAMINOPHEN 500 MG
1000 TABLET ORAL
Status: COMPLETED | OUTPATIENT
Start: 2024-11-07 | End: 2024-11-07

## 2024-11-07 RX ORDER — LEVOFLOXACIN 750 MG/1
750 TABLET ORAL DAILY
Qty: 5 TABLET | Refills: 0 | Status: SHIPPED | OUTPATIENT
Start: 2024-11-07 | End: 2024-11-12

## 2024-11-07 RX ADMIN — CEFTRIAXONE SODIUM 1 G: 1 INJECTION, POWDER, FOR SOLUTION INTRAMUSCULAR; INTRAVENOUS at 11:11

## 2024-11-07 RX ADMIN — ACETAMINOPHEN 1000 MG: 500 TABLET ORAL at 10:11

## 2024-11-07 RX ADMIN — PROCHLORPERAZINE EDISYLATE 5 MG: 5 INJECTION INTRAMUSCULAR; INTRAVENOUS at 11:11

## 2024-11-07 RX ADMIN — TIZANIDINE 4 MG: 2 TABLET ORAL at 11:11

## 2024-11-07 RX ADMIN — ONDANSETRON 4 MG: 2 INJECTION INTRAMUSCULAR; INTRAVENOUS at 10:11

## 2024-11-07 NOTE — ED PROVIDER NOTES
Encounter Date: 11/7/2024       History     Chief Complaint   Patient presents with    URI     Coughing up brown    Chest Pain     + cardiac hx     HPI  Patient is a 60yo recently seen for URI symptoms on a Z-Sarkis presenting due to worsening progression of her symptoms.  States that she has had worsening cough with some associated chest pain when she coughs.  Sputum has changed from clear to dark brown mucus.  Persistent nasal congestion, headaches.  Patient now endorsing some stomach discomfort with associated nausea.  Denies diarrhea, vomiting, blood in her stool, recorded fevers.  She reports that she feels feverish, and also endorsing some frequency of urination.  Denies flank pain.  Denies blood in her urine.    Review of patient's allergies indicates:   Allergen Reactions    Aspirin Hives    Imitrex [sumatriptan] Palpitations    Penicillins Hives and Swelling    Shellfish containing products Anaphylaxis     seafood    Reglan [metoclopramide hcl] Other (See Comments)     Parkinsonism      Past Medical History:   Diagnosis Date    Anticoagulant long-term use     Anxiety     Asthma     Atrial fibrillation     Brain anoxic injury     Cervicalgia 8/28/2014    CHI (closed head injury) 2/19/2013    Convulsion 5/30/2015    Decreased ROM of left shoulder 4/12/2017    Defibrillator activation 2013    Depression     Heart block     History of sudden cardiac arrest 2/2013    PEA arrest with subsequent long-QT    Hx of psychiatric care     Hypertension     Left atrial enlargement 4/11/2018    Pacemaker 2013    Paresthesia 11/1/2013    Prolonged Q-T interval on ECG 2/8/2013    Psychiatric problem     Seizures     Sleep difficulties     Stroke     weakness lt side    Therapy     Thyroid disease     Upper airway resistance syndrome 2/21/2017     Past Surgical History:   Procedure Laterality Date    breast reduction      CARDIAC DEFIBRILLATOR PLACEMENT      CARDIAC DEFIBRILLATOR PLACEMENT      CARPAL TUNNEL RELEASE Right      COLONOSCOPY N/A 5/7/2019    Procedure: COLONOSCOPY;  Surgeon: Juan Coffey MD;  Location: [facility] ENDO (2ND FLR);  Service: Endoscopy;  Laterality: N/A;  per DR. Bustamante Pt to have balloon with DR. Coffey due to excesive looping, could not reach cecum - see telephone encounter 3/8/19 and last procedure report dated 6/24/14/ Per Piedad schedule as 90 min case- ERW  pacemaker/AICD Boitronik/   per , ok to hold Xareltox 2 days    COLONOSCOPY N/A 6/2/2022    Procedure: COLONOSCOPY;  Surgeon: Juan Coffey MD;  Location: [facility] ENDO (2ND FLR);  Service: Endoscopy;  Laterality: N/A;  combined orders    ESOPHAGOGASTRODUODENOSCOPY N/A 6/2/2022    Procedure: EGD (ESOPHAGOGASTRODUODENOSCOPY);  Surgeon: Juan Coffey MD;  Location: [facility] ENDO (2ND FLR);  Service: Endoscopy;  Laterality: N/A;  BMI 54; pt requests 1st available [facility]  Fully vaccinated, Hold Xarelto x 2 days per Dr. Mejia and Plavix x 5 days per Dr. Grossman see telephone encounter 4/25/22, Biotronik PM/AICD, prep instr portal and mailed -ml    HERNIA REPAIR      HIATAL HERNIA REPAIR      INSERT / REPLACE / REMOVE PACEMAKER  10/2017    CRT-D upgrade    REVISION OF IMPLANTABLE CARDIOVERTER-DEFIBRILLATOR (ICD) ELECTRODE LEAD PLACEMENT Left 12/7/2020    Procedure: REVISION, INSERTION, ELECTRODE LEAD, ICD;  Surgeon: Avelino Mejia MD;  Location: [facility] EP LAB;  Service: Cardiology;  Laterality: Left;    REVISION OF SKIN POCKET FOR CARDIOVERTER-DEFIBRILLATOR  12/7/2020    Procedure: Revision, Skin Pocket, For Cardioverter-Defibrillator;  Surgeon: Avelino Mejia MD;  Location: [facility] EP LAB;  Service: Cardiology;;    TOTAL REDUCTION MAMMOPLASTY      TRANSESOPHAGEAL ECHOCARDIOGRAPHY N/A 12/7/2020    Procedure: ECHOCARDIOGRAM, TRANSESOPHAGEAL;  Surgeon: Ivory Goodman MD;  Location: [facility] EP LAB;  Service: Cardiology;  Laterality: N/A;    TRANSESOPHAGEAL ECHOCARDIOGRAPHY N/A 12/7/2020    Procedure: ECHOCARDIOGRAM, TRANSESOPHAGEAL;  Surgeon: [facility] Diagnostic Provider;   Location: [facility] OR 2ND FLR;  Service: Cardiology;  Laterality: N/A;    TRIGGER FINGER RELEASE Left 8/2/2019    Procedure: RELEASE, TRIGGER FINGER left middle;  Surgeon: Matt Pugh Jr., MD;  Location: [facility] OR;  Service: Plastics;  Laterality: Left;    TRIGGER FINGER RELEASE Right 2/11/2020    Procedure: RELEASE, TRIGGER FINGER middle;  Surgeon: Matt Pugh Jr., MD;  Location: [facility] OR;  Service: Plastics;  Laterality: Right;    TUBAL LIGATION       Family History   Problem Relation Name Age of Onset    Hypertension Mother      Glaucoma Maternal Grandmother      Glaucoma Paternal Grandmother      COPD Other      Heart failure Other       Social History     Tobacco Use    Smoking status: Never     Passive exposure: Never    Smokeless tobacco: Never   Substance Use Topics    Alcohol use: Yes     Comment: wine, socially    Drug use: No     Review of Systems  Per HPI  Physical Exam     Initial Vitals [11/07/24 0923]   BP Pulse Resp Temp SpO2   136/71 (!) 46 20 99.3 °F (37.4 °C) 95 %      MAP       --         Physical Exam    Constitutional: She appears well-developed and well-nourished. She is not diaphoretic.   HENT: Mouth/Throat: Oropharynx is clear and moist. No oropharyngeal exudate.   Eyes: Right eye exhibits no discharge. Left eye exhibits no discharge.   Cardiovascular:  Normal rate, regular rhythm and normal heart sounds.           No murmur heard.  Pulmonary/Chest: Breath sounds normal. No respiratory distress. She has no wheezes. She has no rhonchi. She has no rales.   Abdominal: Abdomen is soft. She exhibits no distension. There is no abdominal tenderness. There is no rebound and no guarding.     Neurological: She is alert. GCS score is 15. GCS eye subscore is 4. GCS verbal subscore is 5. GCS motor subscore is 6.   Skin: Skin is warm and dry.         ED Course   Procedures  Labs Reviewed   CBC W/ AUTO DIFFERENTIAL - Abnormal       Result Value    WBC 5.95      RBC 3.59 (*)     Hemoglobin 11.1 (*)      Hematocrit 33.3 (*)     MCV 93      MCH 30.9      MCHC 33.3      RDW 15.3 (*)     Platelets 182      MPV 11.2      Immature Granulocytes 0.2      Gran # (ANC) 4.2      Immature Grans (Abs) 0.01      Lymph # 1.3      Mono # 0.5      Eos # 0.0      Baso # 0.00      nRBC 0      Gran % 71.0      Lymph % 21.0      Mono % 7.6      Eosinophil % 0.2      Basophil % 0.0      Differential Method Automated     COMPREHENSIVE METABOLIC PANEL - Abnormal    Sodium 135 (*)     Potassium 3.7      Chloride 106      CO2 23      Glucose 113 (*)     BUN 13      Creatinine 0.8      Calcium 8.7      Total Protein 9.6 (*)     Albumin 2.9 (*)     Total Bilirubin 0.5      Alkaline Phosphatase 59      AST 11      ALT 11      eGFR >60.0      Anion Gap 6 (*)    SARS-COV2 (COVID) WITH FLU/RSV BY PCR - Abnormal    SARS-CoV2 (COVID-19) Qualitative PCR Not Detected      Influenza A, Molecular Not Detected      Influenza B, Molecular Not Detected      RSV Ag by Molecular Method Detected (*)    HIV 1 / 2 ANTIBODY    HIV 1/2 Ag/Ab Non-reactive      Narrative:     Release to patient->Immediate   HEPATITIS C ANTIBODY    Hepatitis C Ab Non-reactive      Narrative:     Release to patient->Immediate   URINALYSIS, REFLEX TO URINE CULTURE    Specimen UA Urine, Clean Catch      Color, UA Yellow      Appearance, UA Clear      pH, UA 7.0      Specific Gravity, UA 1.020      Protein, UA Negative      Glucose, UA Negative      Ketones, UA Negative      Bilirubin (UA) Negative      Occult Blood UA Negative      Nitrite, UA Negative      Leukocytes, UA Negative      Narrative:     Specimen Source->Urine          Imaging Results              X-Ray Chest AP Portable (Final result)  Result time 11/07/24 10:46:00      Final result by James Lawson MD (11/07/24 10:46:00)                   Impression:      Development of patchy increased density within the mid left lung, possibly subtle left-sided pneumonia.    Cardiac defibrillator.      Electronically signed  by: James Lawson MD  Date:    11/07/2024  Time:    10:46               Narrative:    EXAMINATION:  XR CHEST AP PORTABLE    CLINICAL HISTORY:  Cough, unspecified    TECHNIQUE:  Single frontal view of the chest was performed.    COMPARISON:  09/20/2023.    FINDINGS:  There is a cardiac defibrillator present.  The heart and mediastinal structures appear unchanged.  Pulmonary vasculature is within normal limits.  There is patchy increased density within the mid left lung which may represent subtle left-sided pneumonia.  The lungs are otherwise clear.  There is no evidence for pneumothorax or pleural effusions.  Bony structures appear intact.                                       Medications   ondansetron injection 4 mg (4 mg Intravenous Given 11/7/24 1005)   acetaminophen tablet 1,000 mg (1,000 mg Oral Given 11/7/24 1006)   prochlorperazine injection Soln 5 mg (5 mg Intravenous Given 11/7/24 1135)   tiZANidine tablet 4 mg (4 mg Oral Given 11/7/24 1137)   cefTRIAXone injection 1 g (1 g Intravenous Given 11/7/24 1137)     Medical Decision Making  Patient is a 59-year-old afebrile, hemodynamically stable, in no acute distress female presenting due to persistent/worsening URI symptoms despite azithromycin treatment.    DDX:  Pneumonia, URI, asthma, COPD    No wheezing on exam.  No history of asthma.  No history of COPD.  Low suspicion for asthma VS COPD.  Patient's symptoms persistent since she was seen 2 days ago.  Symptoms did not respond to Z-Sarkis.  Chest x-ray showing signs of developing opacities in the mid left lung.  Patient afebrile without white count.  Could be signs of developing pneumonia.  We will treat with ceftriaxone in the emergency room and sent home on levofloxacin.  Viral panel showing positive for RSV.  Likely viral component for symptoms.  Instructed patient to be careful transmitting infection to other people.  Patient had headache with her symptoms.  States that she has had this headache before.  She  treats it with tizanidine.  Patient given tizanidine 4 headache with symptom resolution.  Zofran and Compazine were given for nausea.  Patient feels well.  Instructed patient that she should not take donepezil while taking levofloxacin.  Patient states she understands and reassures that she does not take donepezil anymore.  Instructed to stop taking azithromycin.  Plan is to discharge patient home with levofloxacin and follow up with her primary in 1 week.  Patient agrees with plan.  Questions answered.  Return precautions given.    Amount and/or Complexity of Data Reviewed  Labs: ordered. Decision-making details documented in ED Course.  Radiology: ordered.    Risk  OTC drugs.  Prescription drug management.               ED Course as of 11/07/24 1416   Thu Nov 07, 2024   1028 WBC: 5.95 [DC]   1031 Hemoglobin(!): 11.1 [DC]   1031 Platelet Count: 182 [DC]   1109 Sodium(!): 135 [MB]   1109 Hemoglobin(!): 11.1 [MB]      ED Course User Index  [DC] Toby Mendoza MD  [MB] Juan Carlos Liu MD                           Clinical Impression:  Final diagnoses:  [R07.89] Chest discomfort  [R05.9] Cough  [J06.9] Upper respiratory tract infection, unspecified type  [J18.9] Pneumonia due to infectious organism, unspecified laterality, unspecified part of lung (Primary)          ED Disposition Condition    Discharge           ED Prescriptions       Medication Sig Dispense Start Date End Date Auth. Provider    levoFLOXacin (LEVAQUIN) 750 MG tablet Take 1 tablet (750 mg total) by mouth once daily. for 5 days 5 tablet 11/7/2024 11/12/2024 Juan Carlos Liu MD          Follow-up Information       Follow up With Specialties Details Why Contact Info    Tiffany Mina MD Internal Medicine In 1 week  [address]  [place] [address]  [phone]               Juan Carlos Liu MD  Resident  11/07/24 1414

## 2024-11-07 NOTE — ED NOTES
Assumed care of the patient. Report received from RODRIGO Daugherty. Pt on continuous cardiac monitoring, continuous pulse oximetry, and automatic BP cuff cycling Q30min. Pt in hospital gown, side rails up X2, bed low and locked, and call light is placed within reach. No family/visitors at bedside at this time. Pt denies any complaints or needs.

## 2024-11-07 NOTE — DISCHARGE INSTRUCTIONS
You were seen in the emergency room for progression of your upper respiratory symptoms.  Our workup was consistent with diagnosis of pneumonia.  Additionally from our findings you were seen to have an RSV infection as well.  RSV is commonly transmitted viral infection of the upper airways.  We recommend that you stop taking your Z-Sarkis.  We have prescribed you another antibiotic known as Levaquin to take once daily.  While taking Levaquin we recommend that you stop taking your Aricept due to the risk of a dangerous heart rhythm when these 2 drugs are combined.   Please follow up with your primary in 1 week.    Please return to the ER if you develop worsening chest pain, cough up blood, persistent fevers despite antibiotic therapy, worsening shortness of breath.

## 2024-11-07 NOTE — ED TRIAGE NOTES
Pt arrives to ED stating last Saturday she began having a cough, SOB, and flu like symptoms. States she went to her PCP received abx, cough medication, and inhaler. Pt states she used the inhaler only 1 time since, yesterday. Reports pounding headache today with chest pain and SOB. Baseline left sided weakness from previous stroke.

## 2024-11-11 ENCOUNTER — TELEPHONE (OUTPATIENT)
Dept: OTOLARYNGOLOGY | Facility: CLINIC | Age: 59
End: 2024-11-11
Payer: MEDICARE

## 2024-11-11 NOTE — TELEPHONE ENCOUNTER
CALLED PT SHE ASK THAT I CALL HER BACK BECAUSE SHE HAD AN EMERGENCY GOING ON , CALLED PT BACK L/M FOR HER TO CALL THE OFFICE TO SCHEDULE AN APPT

## 2024-11-12 ENCOUNTER — TELEPHONE (OUTPATIENT)
Dept: ELECTROPHYSIOLOGY | Facility: CLINIC | Age: 59
End: 2024-11-12
Payer: MEDICARE

## 2024-11-12 RX ORDER — RIVAROXABAN 20 MG/1
TABLET, FILM COATED ORAL
Qty: 30 TABLET | Refills: 11 | Status: SHIPPED | OUTPATIENT
Start: 2024-11-12 | End: 2024-11-13 | Stop reason: SDUPTHER

## 2024-11-12 NOTE — TELEPHONE ENCOUNTER
Call returned. Informed that recent clinicals needed to support PA submitted for device interrogations. Requested clinicals faxed to number provided [phone].

## 2024-11-12 NOTE — TELEPHONE ENCOUNTER
----- Message from Vanesa sent at 11/12/2024 11:26 AM CST -----  Regarding: MARIO prescottu  Opal [phone] with [facility] is calling in ref to a fax she say was sent on 11/10/24 in ref to an Imaging PA for this patient. They are requesting clinical notes to support the request. She says the request list the pt doctor as Dr. King, but the chart says Dr. Mejia.    Thanks

## 2024-11-15 ENCOUNTER — PATIENT MESSAGE (OUTPATIENT)
Dept: INTERNAL MEDICINE | Facility: CLINIC | Age: 59
End: 2024-11-15
Payer: MEDICARE

## 2024-11-19 ENCOUNTER — OFFICE VISIT (OUTPATIENT)
Dept: INTERNAL MEDICINE | Facility: CLINIC | Age: 59
End: 2024-11-19
Payer: MEDICARE

## 2024-11-19 VITALS
OXYGEN SATURATION: 97 % | WEIGHT: 291.69 LBS | BODY MASS INDEX: 49.8 KG/M2 | DIASTOLIC BLOOD PRESSURE: 80 MMHG | HEIGHT: 64 IN | HEART RATE: 60 BPM | SYSTOLIC BLOOD PRESSURE: 128 MMHG

## 2024-11-19 DIAGNOSIS — F41.9 ANXIETY: ICD-10-CM

## 2024-11-19 DIAGNOSIS — F33.1 DEPRESSION, MAJOR, RECURRENT, MODERATE: ICD-10-CM

## 2024-11-19 DIAGNOSIS — I70.0 AORTIC ATHEROSCLEROSIS: ICD-10-CM

## 2024-11-19 DIAGNOSIS — R73.03 PREDIABETES: ICD-10-CM

## 2024-11-19 DIAGNOSIS — I44.2 COMPLETE AV BLOCK: ICD-10-CM

## 2024-11-19 DIAGNOSIS — R53.82 CHRONIC FATIGUE: ICD-10-CM

## 2024-11-19 DIAGNOSIS — I42.8 NONISCHEMIC CARDIOMYOPATHY: ICD-10-CM

## 2024-11-19 DIAGNOSIS — J12.1 PNEUMONIA DUE TO RESPIRATORY SYNCYTIAL VIRUS (RSV): Primary | ICD-10-CM

## 2024-11-19 DIAGNOSIS — E66.813 CLASS 3 SEVERE OBESITY DUE TO EXCESS CALORIES WITH SERIOUS COMORBIDITY AND BODY MASS INDEX (BMI) OF 50.0 TO 59.9 IN ADULT: ICD-10-CM

## 2024-11-19 DIAGNOSIS — Z95.810 BIVENTRICULAR AUTOMATIC IMPLANTABLE CARDIOVERTER DEFIBRILLATOR IN SITU: ICD-10-CM

## 2024-11-19 DIAGNOSIS — I10 ESSENTIAL HYPERTENSION: ICD-10-CM

## 2024-11-19 DIAGNOSIS — I48.0 PAROXYSMAL ATRIAL FIBRILLATION: ICD-10-CM

## 2024-11-19 DIAGNOSIS — Z79.01 LONG TERM (CURRENT) USE OF ANTICOAGULANTS: ICD-10-CM

## 2024-11-19 DIAGNOSIS — J34.89 NASAL SORE: ICD-10-CM

## 2024-11-19 DIAGNOSIS — G43.711 CHRONIC MIGRAINE WITHOUT AURA, WITH INTRACTABLE MIGRAINE, SO STATED, WITH STATUS MIGRAINOSUS: ICD-10-CM

## 2024-11-19 DIAGNOSIS — Z86.718 HISTORY OF DVT (DEEP VEIN THROMBOSIS): ICD-10-CM

## 2024-11-19 DIAGNOSIS — Z86.74 HISTORY OF SUDDEN CARDIAC ARREST: ICD-10-CM

## 2024-11-19 DIAGNOSIS — G47.33 OBSTRUCTIVE SLEEP APNEA: ICD-10-CM

## 2024-11-19 DIAGNOSIS — E66.01 CLASS 3 SEVERE OBESITY DUE TO EXCESS CALORIES WITH SERIOUS COMORBIDITY AND BODY MASS INDEX (BMI) OF 50.0 TO 59.9 IN ADULT: ICD-10-CM

## 2024-11-19 PROCEDURE — 1159F MED LIST DOCD IN RCRD: CPT | Mod: CPTII,S$GLB,, | Performed by: NURSE PRACTITIONER

## 2024-11-19 PROCEDURE — 3044F HG A1C LEVEL LT 7.0%: CPT | Mod: CPTII,S$GLB,, | Performed by: NURSE PRACTITIONER

## 2024-11-19 PROCEDURE — 3079F DIAST BP 80-89 MM HG: CPT | Mod: CPTII,S$GLB,, | Performed by: NURSE PRACTITIONER

## 2024-11-19 PROCEDURE — 4010F ACE/ARB THERAPY RXD/TAKEN: CPT | Mod: CPTII,S$GLB,, | Performed by: NURSE PRACTITIONER

## 2024-11-19 PROCEDURE — 3074F SYST BP LT 130 MM HG: CPT | Mod: CPTII,S$GLB,, | Performed by: NURSE PRACTITIONER

## 2024-11-19 PROCEDURE — 99999 PR PBB SHADOW E&M-EST. PATIENT-LVL V: CPT | Mod: PBBFAC,,, | Performed by: NURSE PRACTITIONER

## 2024-11-19 PROCEDURE — 99214 OFFICE O/P EST MOD 30 MIN: CPT | Mod: S$GLB,,, | Performed by: NURSE PRACTITIONER

## 2024-11-19 PROCEDURE — 3008F BODY MASS INDEX DOCD: CPT | Mod: CPTII,S$GLB,, | Performed by: NURSE PRACTITIONER

## 2024-11-19 RX ORDER — MUPIROCIN 20 MG/G
OINTMENT TOPICAL 3 TIMES DAILY
Qty: 22 G | Refills: 0 | Status: SHIPPED | OUTPATIENT
Start: 2024-11-19

## 2024-11-19 NOTE — TELEPHONE ENCOUNTER
Dr. Ruth,     Pt has a severe allergy to ASA. Pt stated that she breaks out into hives and can not tolerate even a low dose.   Please advise so Pt can be scheduled. (see below for details)         3/14/19 8:29 AM   Note      MD Jeanette Richards, RN   Caller: Unspecified (Yesterday,  1:24 PM)              Yes. SHa can take ASA 81 mg for 5 days instead    Previous Messages              Patient Calls       Perlita Ruth MD   You 14 minutes ago (8:14 AM)          Yes. SHa can take ASA 81 mg for 5 days instead    Routing Comment          You routed conversation to Perlita Ruth MD; Faraz Perlita Staff 19 hours ago (1:27 PM)         You 19 hours ago (1:24 PM)              Dr. Ruth,      Please see below. Patient needs to be scheduled for a colonoscopy. Is it okay for patient to hold Plavix 5 days prior to procedure? Please advise.     Thank you                 Toby Paredes MD   to Sandi Fowler MA           12:47 PM    Need Dr. Ruth to make this determination - she is her prescribing stroke doc.  Can you help me get an answer from her?   February 25, 2019               12:56 PM   Sofia Corey MA routed this conversation to Perlita Ruth MD                11:26 AM   Mariel Jasso RN routed this conversation to MD Chin Richards [facility] Staff  MD Chin Jay MD Melissa Plauche, RN           11:23 AM   Note      Please see message below and advise regarding patient holding Plavix as soon as possible. Endoscopy order was placed in October.     Thank you,  Mariel      February 6, 2019               2:25 PM   Mariel Jasso RN routed this conversation to MD Mariel Richards RN           2:24 PM   Note      Patient needs to be scheduled for a colonoscopy. Is it okay for patient to hold Plavix 5 days prior to procedure? Please advise.     Thank you,  Mariel Bradshaw          LCV: 10/24/24  Today he is alone.    Chief Complaint   Patient presents with    Derm Problem    Office Visit     ALLERGIES:  No Known Allergies  Current Outpatient Medications   Medication Sig Dispense Refill    zolpidem (AMBIEN CR) 12.5 MG CR tablet TAKE 1 TABLET BY MOUTH EVERY NIGHT AS NEEDED FOR SLEEP 30 tablet 0    DISPENSE Lactic acid 30%, salicylic acid 10% in base of 60 gm vanicream.  Apply qhs and tape with duct tape.  Repeat nightly.  File as tolerated (Patient not taking: Reported on 11/19/2024) 60 g 1    nystatin-triamcinolone (MYCOLOG II) 843525-8.1 UNIT/GM-% ointment Apply twice daily strictly to affected areas for 2 weeks (SEE Note to Pharmacy) (Patient not taking: Reported on 11/19/2024) 30 g 0     No current facility-administered medications for this visit.     Past Medical History:   Diagnosis Date    Agatston coronary artery calcium score less than 100 02/2020    Score 0    DVT, lower extremity, distal, acute, right  (CMD) 04/10/2024    Xarelto discontinued 10/1/2024    Family history of ischemic heart disease     Insomnia     lunesta     PVC (premature ventricular contraction)      Social History     Socioeconomic History    Marital status: /Civil Union     Spouse name: Not on file    Number of children: Not on file    Years of education: Not on file    Highest education level: Not on file   Occupational History    Not on file   Tobacco Use    Smoking status: Never    Smokeless tobacco: Never   Substance and Sexual Activity    Alcohol use: Yes     Comment: Socc/Weekend    Drug use: Not on file    Sexual activity: Not on file   Other Topics Concern    Not on file   Social History Narrative    Not on file     Social Determinants of Health     Financial Resource Strain: Not on file   Food Insecurity: Low Risk  (9/29/2024)    Food Insecurity     Worried about Food: Never true     Food is Gone: Never true   Transportation Needs: Not At Risk (9/29/2024)    Transportation Needs     Lack of  Reliable Transportation: No   Physical Activity: Not on file   Stress: Not on file   Social Connections: Not on file   Interpersonal Safety: Not on file     Patient's medications, allergies, past medical, surgical, social and family histories were reviewed and updated as appropriate.      HPI: 58 year old male here for wart on right fourth digit and right fifth toe present for a while; tried OTC and LN2; LN2 helped wart is getting smaller    Sun screen use: yes.  Personal h/o skin cancer:None.   Family h/o skin cancer :None    ROS:   General:No fever, URI, Arthralgias  Skin:No other skin complaints today. Besides positives in HPI, all other systems negative     PE:    Alert and oriented x3   Well developed, well nourished, normal mood and affect  Eyes: No injection, No respiratory distress.  Focused skin exam significant for:     small (5-7 mm), sharply demarcated, hyperkeratotic, skin colored, rough, verrucous papule with irregular surfaces and capillary dotting located on right 4th digit and right 5th toe       A/P:  1. Viral wart  -Acute, uncomplicated problem with low risk of morbidity from treatment  -LN2 applied (see procedure note)  -Keep the area clean  -Avoid scratching or picking at the bumps  -Recommended formula 13 after 2 weeks from now    PROCEDURE NOTE: Cryodestruction  DIAGNOSIS: Verrucae  LOCATION(S): Right 4th digit, right 5th toe  NUMBER OF LESIONS TREATED: 2  After discussing the risks, the procedure was consented to.  Liquid nitrogen was applied.  Aftercare was discussed.  Patient tolerated procedure well.  Instructed patient/parent that within hours after treatment, a blister may form.  If the blister breaks, clean the area to prevent the spread of the wart virus. Avoid contact with the fluid, which may contain the wart virus.  The blister will dry up over the next few days, and the wart may fall off. Vaseline should be applied twice daily until all flaking, peeling, and scaling has cleared.  Multiple treatments may be needed to get rid of the wart.      I have personally reviewed and analyzed past dermatology clinic notes and PCP notes (if relevant)  Patient/parent indicated understanding of the diagnosis and agreed with the plan.    RTC: 1 month or as needed  Call office with questions or concerns.    Electronically Signed by: ALISON Isidro  Consulting Physician : Dr Rudi Rowe MD   11/19/2024

## 2024-11-19 NOTE — PROGRESS NOTES
INTERNAL MEDICINE PROGRESS NOTE    CHIEF COMPLAINT     Chief Complaint   Patient presents with    Follow-up     RSV- Pneumonia        HPI     Amna Chawla is a 59 y.o. female  with hx CVA and TIA c residual cognitive deficits (most recently c TIA 9/2018 per pt, has mild B weakness from several strokes in the past), carotid a disease, HTN, paroxysmal A fib c LAE, hx sudden cardiac arrest c VF and no ischemic heart disease and preserved EF (2013), intermittent 3rd deg AV block and symptomatic LVEF of 40% in setting of normal PET stress and chronic RV pacing s/p CRT-D, HLD, thyroid nodule s/p FNA 7/2018 c path c/w benign follicular nodule, depression with anxiety followed by psych, chronic complex h/a c occipital neuralgia followed by Neuro and on mult meds and has also had tx c botox, GERD c hiatal hernia, B carpal tunnel s/p release B, hx L middle ring finger trigger finger s/p release, SHIRA on APAP 6-20cm followed in sleep clinic, insomnia, prediabetes who presents for a follow up visit today.    Here for ED follow up - was seen 11/7/2024 for failure to improve on z-pack x 2 days. Continued to have cough, chest pain and dark sputum.         Impression:        Development of patchy increased density within the mid left lung, possibly subtle left-sided pneumonia.     Cardiac defibrillator.             Was hemodynamically stable - started on ceftriaxone in ED and send home on levaquin x 1 week   Note: RSV positive       RESPIRATORY SYMPTOMS:  She reports persistent hoarseness, a sore in her nose, and a productive cough with mucus. She experiences shortness of breath with activity, needing to stop and catch her breath after moving around. She denies audible wheezing. She expresses a desire for symptom improvement.    RECENT EMERGENCY ROOM VISIT:  She recently visited the emergency room where she tested negative for COVID-19 and influenza, but positive for RSV (RSV). She was diagnosed with pneumonia associated with the  RSV.    FATIGUE:  She reports significant fatigue, describing a notable decrease in energy levels with increased sleep and reduced activity, which she attributes to the RSV-associated pneumonia.    MEDICATIONS:  She completed the prescribed course of Levaquin and discontinued the cough syrup due to ineffectiveness. She reports increased use of pearls and uses her inhaler as needed, particularly for activity-induced shortness of breath.    SELF-CARE MEASURES:  She has implemented self-care measures, including increased water intake and drinking ginger and lemon tea.    FAMILY MEDICAL HISTORY:  Her 9-month-old granddaughter has been diagnosed with RSV, and her 17-year-old has pneumonia.      ROS:  General: -fever, -chills, +fatigue, -weight gain, -weight loss  Eyes: -vision changes, -redness, -discharge  ENT: -ear pain, -nasal congestion, -sore throat  Cardiovascular: -chest pain, -palpitations, -lower extremity edema  Respiratory: +cough, +shortness of breath, -wheezing  Gastrointestinal: -abdominal pain, -nausea, -vomiting, -diarrhea, -constipation, -blood in stool  Genitourinary: -dysuria, -hematuria, -frequency  Musculoskeletal: -joint pain, -muscle pain  Skin: -rash, -lesion  Neurological: -headache, -dizziness, -numbness, -tingling  Psychiatric: -anxiety, -depression, -sleep difficulty         Past Medical History:  Past Medical History:   Diagnosis Date    Anticoagulant long-term use     Anxiety     Asthma     Atrial fibrillation     Brain anoxic injury     Cervicalgia 8/28/2014    CHI (closed head injury) 2/19/2013    Convulsion 5/30/2015    Decreased ROM of left shoulder 4/12/2017    Defibrillator activation 2013    Depression     Heart block     History of sudden cardiac arrest 2/2013    PEA arrest with subsequent long-QT    Hx of psychiatric care     Hypertension     Left atrial enlargement 4/11/2018    Pacemaker 2013    Paresthesia 11/1/2013    Prolonged Q-T interval on ECG 2/8/2013    Psychiatric problem      Seizures     Sleep difficulties     Stroke     weakness lt side    Therapy     Thyroid disease     Upper airway resistance syndrome 2/21/2017       Home Medications:  Prior to Admission medications    Medication Sig Start Date End Date Taking? Authorizing Provider   albuterol (VENTOLIN HFA) 90 mcg/actuation inhaler Inhale 2 puffs into the lungs every 6 (six) hours as needed for Wheezing. Rescue 11/5/24 11/5/25  Michelle Perry NP   amLODIPine (NORVASC) 5 MG tablet Take 1 tablet (5 mg total) by mouth once daily. 4/1/24 4/1/25  Tiffany Mina MD   ARIPiprazole (ABILIFY) 15 MG Tab Take 1 tablet (15 mg total) by mouth once daily. 7/2/24   Kade Huffman III, NP   carvediloL (COREG) 25 MG tablet Take 2 tablets (50 mg total) by mouth 2 (two) times daily with meals. 12/8/23   Mariel Tomlinson NP   cetirizine (ZYRTEC) 10 MG tablet Take 1 tablet (10 mg total) by mouth once daily. for 7 days 11/5/24 11/12/24  Michelle Perry NP   clonazePAM (KLONOPIN) 1 MG tablet TAKE 1 TABLET BY MOUTH DAILY AS NEEDED FOR ANXIETY 7/2/24   Kade Huffman III, NP   cyanocobalamin, vitamin B-12, 1,000 mcg Subl Place 1,000 mcg under the tongue once daily. 9/20/23   Dasha Osorio, PA-C   donepezil (ARICEPT) 10 MG tablet Take 1 tablet (10 mg total) by mouth 2 (two) times daily. 7/18/17   Toby Paredes MD   EPINEPHrine (EPIPEN) 0.3 mg/0.3 mL AtIn Inject 0.3 mLs (0.3 mg total) into the muscle once. for 1 dose 5/1/24 5/1/24  David Cortez MD   flurbiprofen (ANSAID) 100 MG tablet Take 1 tablet (100 mg total) by mouth 2 (two) times daily as needed (migraine). 2/24/23   David Cortez MD   fluticasone propionate (FLONASE) 50 mcg/actuation nasal spray 1 spray (50 mcg total) by Each Nostril route once daily. 11/5/24   Michelle Perry NP   fremanezumab-vfrm (AJOVY) 225 mg/1.5 mL injection Inject 1.5 mLs (225 mg total) into the skin every 28 days. 2/17/22   David Cortez MD   furosemide (LASIX) 20 MG tablet Take 1 tablet (20  mg total) by mouth every other day.  Patient not taking: Reported on 5/1/2024 5/10/23 5/9/24  Tiffany Mina MD   gabapentin (NEURONTIN) 300 MG capsule Take 1 capsule (300 mg total) by mouth 3 (three) times daily. 2/2/23   Hugo Borden MD   losartan (COZAAR) 100 MG tablet Take 1 tablet (100 mg total) by mouth once daily. 4/1/24 4/1/25  Tiffany Mina MD   magnesium 200 mg Tab Take 200 mg by mouth once daily. 3/7/18   David Cortez MD   omeprazole (PRILOSEC) 40 MG capsule Take 1 capsule (40 mg total) by mouth once daily. Take 30 minutes before breakfast 9/21/24 9/21/25  Tiffany Mina MD   ondansetron (ZOFRAN-ODT) 4 MG TbDL DISSOLVE 1 TABLET(4 MG) ON THE TONGUE EVERY 12 HOURS AS NEEDED FOR NAUSEA 12/8/23   Mariel Tomlinson NP   polyethylene glycol (GLYCOLAX) 17 gram PwPk Take 17 g by mouth once daily. 7/6/23   David Cortez MD   rivaroxaban (XARELTO) 20 mg Tab Take 1 tablet (20 mg total) by mouth daily with dinner or evening meal. 11/13/24   Avelino Mejia MD   rosuvastatin (CRESTOR) 40 MG Tab Take 1 tablet (40 mg total) by mouth every evening. 4/1/24   Tiffany Mina MD   sertraline (ZOLOFT) 100 MG tablet Take 2 tablets (200 mg total) by mouth once daily. 7/2/24   Kade Huffman III, NP   tiaGABine (GABITRIL) 4 MG tablet Take 1 tablet (4 mg total) by mouth every evening. 8/3/20   David Cortez MD   tiZANidine (ZANAFLEX) 4 MG tablet Take 1 tablet (4 mg total) by mouth 3 (three) times daily as needed (muscle pain). 9/23/24   David Cortez MD   tiZANidine (ZANAFLEX) 4 MG tablet Take 1 tablet (4 mg total) by mouth 3 (three) times daily as needed (muscle pain). 9/23/24   David Cortez MD   ubrogepant (UBROGEPANT) 100 mg tablet Take 1 tablet (100 mg total) by mouth 2 (two) times daily as needed for Migraine. 6/22/21   David Cortez MD   zolpidem (AMBIEN) 10 mg Tab Take 1 tablet (10 mg total) by mouth nightly as needed (insomnia). 7/2/24   Kade Huffman III, NP   metFORMIN (GLUCOPHAGE) 500 MG tablet  "Take 1 tablet (500 mg total) by mouth 2 (two) times daily with meals. 4/19/22 6/2/22  Tiffany Mina MD           Health Maintainence:   Immunizations:  Health Maintenance         Date Due Completion Date    Influenza Vaccine (1) 09/01/2024 9/20/2023    Cervical Cancer Screening 09/10/2024 9/10/2019    COVID-19 Vaccine (4 - 2024-25 season) 09/12/2025 (Originally 9/1/2024) 1/10/2022    Hemoglobin A1c (Prediabetes) 04/02/2025 4/2/2024    Lipid Panel 04/02/2025 4/2/2024    Mammogram 07/02/2025 7/2/2024    High Dose Statin 11/05/2025 11/5/2024    Colorectal Cancer Screening 06/02/2027 6/2/2022    TETANUS VACCINE 10/02/2028 10/2/2018    RSV Vaccine (Age 60+ and Pregnant patients) (1 - 1-dose 75+ series) 07/05/2041 ---             PHYSICAL EXAM     /80 (BP Location: Left arm, Patient Position: Sitting)   Pulse 60   Ht 5' 4" (1.626 m)   Wt 132.3 kg (291 lb 10.7 oz)   LMP 01/03/2016   SpO2 97%   BMI 50.06 kg/m²     Physical Exam  Vitals reviewed.   Constitutional:       Appearance: She is well-developed.   HENT:      Head: Normocephalic.      Right Ear: External ear normal.      Left Ear: External ear normal.      Nose: Nose normal.        Mouth/Throat:      Pharynx: No oropharyngeal exudate.   Eyes:      Pupils: Pupils are equal, round, and reactive to light.   Neck:      Thyroid: No thyromegaly.      Vascular: No JVD.      Trachea: No tracheal deviation.   Cardiovascular:      Rate and Rhythm: Normal rate and regular rhythm.      Heart sounds: Normal heart sounds. No murmur heard.     No friction rub. No gallop.   Pulmonary:      Effort: Pulmonary effort is normal. No respiratory distress.      Breath sounds: Normal breath sounds. No wheezing or rales.   Abdominal:      General: Bowel sounds are normal. There is no distension.      Palpations: Abdomen is soft.      Tenderness: There is no abdominal tenderness.   Musculoskeletal:         General: No tenderness. Normal range of motion.      Cervical back: Neck " supple.   Lymphadenopathy:      Cervical: No cervical adenopathy.   Skin:     General: Skin is warm and dry.      Findings: No rash.   Neurological:      Mental Status: She is alert and oriented to person, place, and time.   Psychiatric:         Behavior: Behavior normal.         LABS     Lab Results   Component Value Date    HGBA1C 6.3 (H) 04/02/2024     CMP  Sodium   Date Value Ref Range Status   11/07/2024 135 (L) 136 - 145 mmol/L Final     Potassium   Date Value Ref Range Status   11/07/2024 3.7 3.5 - 5.1 mmol/L Final     Chloride   Date Value Ref Range Status   11/07/2024 106 95 - 110 mmol/L Final     CO2   Date Value Ref Range Status   11/07/2024 23 23 - 29 mmol/L Final     Glucose   Date Value Ref Range Status   11/07/2024 113 (H) 70 - 110 mg/dL Final     BUN   Date Value Ref Range Status   11/07/2024 13 6 - 20 mg/dL Final     Creatinine   Date Value Ref Range Status   11/07/2024 0.8 0.5 - 1.4 mg/dL Final     Calcium   Date Value Ref Range Status   11/07/2024 8.7 8.7 - 10.5 mg/dL Final     Total Protein   Date Value Ref Range Status   11/07/2024 9.6 (H) 6.0 - 8.4 g/dL Final     Albumin   Date Value Ref Range Status   11/07/2024 2.9 (L) 3.5 - 5.2 g/dL Final     Total Bilirubin   Date Value Ref Range Status   11/07/2024 0.5 0.1 - 1.0 mg/dL Final     Comment:     For infants and newborns, interpretation of results should be based  on gestational age, weight and in agreement with clinical  observations.    Premature Infant recommended reference ranges:  Up to 24 hours.............<8.0 mg/dL  Up to 48 hours............<12.0 mg/dL  3-5 days..................<15.0 mg/dL  6-29 days.................<15.0 mg/dL       Alkaline Phosphatase   Date Value Ref Range Status   11/07/2024 59 40 - 150 U/L Final     AST   Date Value Ref Range Status   11/07/2024 11 10 - 40 U/L Final     ALT   Date Value Ref Range Status   11/07/2024 11 10 - 44 U/L Final     Anion Gap   Date Value Ref Range Status   11/07/2024 6 (L) 8 - 16 mmol/L  Final     eGFR if    Date Value Ref Range Status   05/17/2022 >60.0 >60 mL/min/1.73 m^2 Final     eGFR if non    Date Value Ref Range Status   05/17/2022 >60.0 >60 mL/min/1.73 m^2 Final     Comment:     Calculation used to obtain the estimated glomerular filtration  rate (eGFR) is the CKD-EPI equation.        Lab Results   Component Value Date    WBC 5.95 11/07/2024    HGB 11.1 (L) 11/07/2024    HCT 33.3 (L) 11/07/2024    MCV 93 11/07/2024     11/07/2024     Lab Results   Component Value Date    CHOL 162 04/02/2024    CHOL 97 (L) 05/05/2023    CHOL 126 05/17/2022     Lab Results   Component Value Date    HDL 32 (L) 04/02/2024    HDL 31 (L) 05/05/2023    HDL 38 (L) 05/17/2022     Lab Results   Component Value Date    LDLCALC 108.4 04/02/2024    LDLCALC 52.6 (L) 05/05/2023    LDLCALC 73.0 05/17/2022     Lab Results   Component Value Date    TRIG 108 04/02/2024    TRIG 67 05/05/2023    TRIG 75 05/17/2022     Lab Results   Component Value Date    CHOLHDL 19.8 (L) 04/02/2024    CHOLHDL 32.0 05/05/2023    CHOLHDL 30.2 05/17/2022     Lab Results   Component Value Date    TSH 2.264 04/02/2024    X5YHPVE 141.42 02/07/2013    V2HPUKS 8.4 02/07/2013       ASSESSMENT/PLAN     Amna Chawla is a 59 y.o. female      Assessment & Plan    Evaluated patient's progress post-ER visit for RSV and pneumonia  Assessed clinical improvement despite persistent cough and fatigue  Determined antibiotics course completed appropriately  Noted oxygen levels stable and lung sounds improved  Concluded chest XR likely to lag behind clinical improvement        Pneumonia due to respiratory syncytial virus (RSV)- stable   PNEUMONIA:  - Explained expected course of pneumonia recovery, including persistent cough for up to 4 weeks.  - Discussed gradual nature of improvement and importance of monitoring for steady progress.  - Informed about chest XR lag behind clinical improvement.  - Recommend continuing to rest  and allow body to recover from infection.  - Discontinued Levaquin (completed course as of Thursday).  -     X-Ray Chest PA And Lateral; Future; Expected date: 11/19/2024    Nasal sore- new problem.    Patient to avoid blowing nose frequently to reduce irritation.  - Started OTC saline nasal spray as needed for nasal irrigation.  - Started topical antibiotic ointment for application to sore nasal area after saline rinse.  -     mupirocin (BACTROBAN) 2 % ointment; Apply topically 3 (three) times daily.  Dispense: 22 g; Refill: 0    Fatigue - improving   - Advised on normal fatigue associated with body fighting serious infection    Chronic migraine without aura, with intractable migraine, so stated, with status migrainosus- stable. Will cont current meds and avoid triggers     Depression, major, recurrent, moderate/Anxiety- stable. Will cont abilify, clonazepam, and zoloft, will f/u with psychiatry     Paroxysmal atrial fibrillation- stable. Will cont xarelto and coreg. F/u with EP and monitor for symptoms.     Nonischemic cardiomyopathy from RV pacing, resolved with upgrade cardiac resynchronization therapy- stable. will cont coreg, losartan and lasix. Will monitor for weight gain ans welling. F/u with Cardiology     Essential hypertension- stable. will cont coreg, losartan and lasix. Will monitor for weight gain ans welling. F/u with Cardiology     History of sudden cardiac arrest- stable. will cont coreg, losartan and lasix. Will monitor for weight gain ans welling. F/u with Cardiology     Biventricular automatic implantable cardioverter defibrillator in situ- stable. Will f/uw [facility] EP and monitor     Complete AV block stable. Will f/uw [facility] EP and monitor    Long term (current) use of anticoagulants- stable. Will cont xarelto     History of DVT (deep vein thrombosis)- stable. Will cont xarelto     Aortic atherosclerosis- stable. Will cont coreg and crestor     Prediabetes- stable. Will cont low sugar diet.   Lab  Results   Component Value Date    HGBA1C 6.3 (H) 04/02/2024       Class 3 severe obesity due to excess calories with serious comorbidity and body mass index (BMI) of 50.0 to 59.9 in adult- stable. Will cont healthy diet and exercise for weight loss     Obstructive sleep apnea- stable. Will cont CPAP     FOLLOW-UP:  - Follow-up chest XR ordered for mid-December (around December 12th or 13th).  - Follow up on December 12th (Thursday) for chest XR.  - Contact the office if symptoms worsen or patient denies gradual improvement.           Follow up with PCP     Patient education provided from Ben. Patient was counseled on when and how to seek emergent care.     This note was generated with the assistance of ambient listening technology. Verbal consent was obtained by the patient and accompanying visitor(s) for the recording of patient appointment to facilitate this note. I attest to having reviewed and edited the generated note for accuracy, though some syntax or spelling errors may persist. Please contact the author of this note for any clarification.    Mariel COTTRELL, SAE, FNP-c   Department of Internal Medicine - [facility]  10:08 AM

## 2024-11-19 NOTE — LETTER
November 19, 2024      [facility]  [address]  [facility] [address]  Phone: [phone]  Fax: [phone]       Patient: Amna Chawla   YOB: 1965  Date of Visit: 11/19/2024    To Whom It May Concern:    Ruben Chawla  was at [facility] on 11/19/2024. The patient was accompanied by her daughter, Lauryn Chawla, who may return to work on 11/19/2024 with no restrictions. If you have any questions or concerns, or if I can be of further assistance, please do not hesitate to contact me.    Sincerely,    Mariel COTTRELL, APRN, FNP-c  Nurse Practitioner   Internal Medicine   [address] [facility] [id number] [phone]

## 2024-11-30 ENCOUNTER — EXTERNAL CHRONIC CARE MANAGEMENT (OUTPATIENT)
Dept: PRIMARY CARE CLINIC | Facility: CLINIC | Age: 59
End: 2024-11-30
Payer: MEDICARE

## 2024-11-30 PROCEDURE — 99439 CHRNC CARE MGMT STAF EA ADDL: CPT | Mod: S$GLB,,, | Performed by: INTERNAL MEDICINE

## 2024-11-30 PROCEDURE — 99490 CHRNC CARE MGMT STAFF 1ST 20: CPT | Mod: S$GLB,,, | Performed by: INTERNAL MEDICINE

## 2024-12-03 NOTE — TELEPHONE ENCOUNTER
Call returned to patient, instructed patient that she can take a shower but not to keep the incision site submerged in water. Patient verbalized understanding of instructions.     Zara Lua       ----- Message from Alla Paez sent at 2/13/2020  9:21 AM CST -----  Contact: PUSHPA KEY [[id number]]  Name of Who is Calling : PUSHPA KEY [[id number]]    Patient is requesting a call from staff in regards to whether she is able to take off bandages and if she is allowed to get her Incision wet  .....Please contact to further discuss and advise.    Can the clinic reply by [facility] : No    What Number to Call Back :  [phone]         [TextEntry] : 69-year-old female who presents for abnormal urine study  Patient has a history of Novoa syndrome and saw her gynecologist who ordered a urine cytology.  Cytology came back as negative but there was comment of a differential diagnosis of mechanical disruption versus papillary urothelial lesion.  For this reason she was sent to urology.  She is seen urology for similar indication in 2021.  She denies gross hematuria.  She does think she has a UTI today.  She feels her urine is cloudy and there is a discomfort with voiding.  She was treated for UTI 2 weeks ago with her PCP.  She was put on Cipro.  Her symptoms improved but now they have recurred.  She voids hourly and has nocturia x 1.  She denies urgency and urge incontinence.  She denies straining with emptying.  She denies prior urologic history.  She has a history of 6 vaginal deliveries.  She had a total hysterectomy with BSO in 2018 as prevention for her Novoa syndrome.  She endorses vaginal dryness.  She denies vaginal bulge.  She has regular bowels.  She undergoes routine colonoscopy for screening for Novoa syndrome.  Her most recent was negative in 9/2024.  She has a history of A-fib status post numerous ablations and a Watchman procedure.  She is not on blood thinners. She has hypertension   UA 0 RBC 11/2024 Urine culture negative 11/2024 Urine cytology negative-comment of rare 3-dimensional clusters of urothelial cells.  The differential diagnosis includes mechanical disruption, and papillary urothelial lesion

## 2024-12-09 ENCOUNTER — TELEPHONE (OUTPATIENT)
Dept: ELECTROPHYSIOLOGY | Facility: CLINIC | Age: 59
End: 2024-12-09
Payer: MEDICARE

## 2024-12-27 ENCOUNTER — PROCEDURE VISIT (OUTPATIENT)
Dept: NEUROLOGY | Facility: CLINIC | Age: 59
End: 2024-12-27
Payer: MEDICARE

## 2024-12-27 VITALS — HEART RATE: 63 BPM | DIASTOLIC BLOOD PRESSURE: 75 MMHG | SYSTOLIC BLOOD PRESSURE: 147 MMHG

## 2024-12-27 DIAGNOSIS — G43.711 CHRONIC MIGRAINE WITHOUT AURA, WITH INTRACTABLE MIGRAINE, SO STATED, WITH STATUS MIGRAINOSUS: Primary | ICD-10-CM

## 2024-12-27 PROCEDURE — 64615 CHEMODENERV MUSC MIGRAINE: CPT | Mod: S$GLB,,, | Performed by: PSYCHIATRY & NEUROLOGY

## 2024-12-27 RX ADMIN — ONABOTULINUMTOXINA 200 UNITS: 100 INJECTION, POWDER, LYOPHILIZED, FOR SOLUTION INTRADERMAL; INTRAMUSCULAR at 10:12

## 2024-12-31 ENCOUNTER — EXTERNAL CHRONIC CARE MANAGEMENT (OUTPATIENT)
Dept: PRIMARY CARE CLINIC | Facility: CLINIC | Age: 59
End: 2024-12-31
Payer: MEDICARE

## 2024-12-31 PROCEDURE — 99490 CHRNC CARE MGMT STAFF 1ST 20: CPT | Mod: S$GLB,,, | Performed by: INTERNAL MEDICINE

## 2024-12-31 PROCEDURE — 99439 CHRNC CARE MGMT STAF EA ADDL: CPT | Mod: S$GLB,,, | Performed by: INTERNAL MEDICINE

## 2025-01-06 ENCOUNTER — TELEPHONE (OUTPATIENT)
Facility: CLINIC | Age: 60
End: 2025-01-06
Payer: MEDICARE

## 2025-01-06 DIAGNOSIS — G43.711 CHRONIC MIGRAINE WITHOUT AURA, WITH INTRACTABLE MIGRAINE, SO STATED, WITH STATUS MIGRAINOSUS: Primary | ICD-10-CM

## 2025-01-06 NOTE — PROCEDURES
Follow up:  Migraines are stable     Prior note:   Reports worsening LBP after COVID in Jan   On antibiotics for bronchitis     Prior note:   Reports nausea   Knees buckle frequently   No benefit from new shoes   Not Using cane     Prior note:   Reports overall worse HA   Feels tremors in whole body lasting 3-4 hrs   Tried ativan - helped for a few hours     Prior note:   She is grappling with grief of loss of her sister   Pending grief counseling      Prior note:   S/p course of prednisone for GI allergic reaction (scratch throat - seafood related)     Prior note:   Migraine Hz increased during storm - almost daily   One episode of lightheadedness. No SOB, CP       Prior note:   Reports episode of lightheadedness 2 days ago      Prior note:   S/p COVID vaccine      Prior note:   Reports more physical fatigue   taking 2 naps a day   L sided severe migraine lasted 2 days. Took ubrelvy, motrin, zanaflex      Prior note:   3 days of severe L sided HA + nausea   Gradual onset   Ubrelvy, zanaflex, flurbiprofen - not helping   Vision - nml      Prior note:   HA stable   Try Ubrelvy again      Prior note:   independent left and right parietal ice prick HA      Prior note:   HA much improved   Only 2 since last visit      Prior note:   HA are more frequent   Taking 1600 mg motrin + zanaflex   Went to ER recently      Prior note:   HA overall stable in Hz and intensity   Reports a wearing off effect of BOTOX since last week   Taking zanaflex 8 mg TID        Prior note:   HA started 12/24   She feels BOTOX wore off last week   Reports GI distress from taking to many motrin      Prior note:   4/week HA - L vertex   Jabs - vertex either sides      She reports migraine that woke her up in the morning - associated with L side facial droop      Prior note:   She gets acceptable relief for 2.5 months after BOTOX      Prior note:   No recurrent stroke symptoms   starting having whole body tremors since this AM. Took 1 tablet of  lorazepam   Last night had a HA, took trazodone       Admit Date: 4/11/2018  2:03 PM   Discharge Date and Time: 4/12/2018  8:30 PM   History of Present Illness: Ms. Chawla is a 53 yo F with afib on Eliquis (last dose this am), prior cardiac arrest with associated acute ischemic stroke with residual mild L sided weakness, CAD with ICD in situ, HTN, and HLD who presented with acute R sided weakness and sensation changes.  She originally called EMS for palpitations, but developed acute right sided facial droop and RUE weakness at 1:40pm today, after EMS had arrived.  She denies changes to speech or vision.  SBP en route 200/100.  She is ineligible for tPA 2/2 Eliquis use.  She is not suspected to have an LVO.  She will be admitted to [facility] for observation overnight.     Hospital Course (synopsis of major diagnoses, care, treatment, and services provided during the course of the hospital stay): Ms. Chawla was admitted for acute stroke workup. Pt with pacemaker so unable to obtain MRI Brain; CTA H/N demonstrated no evidence acute infarction, though did exhibit noncalcified plaquing of carotid bifurcations and proximal ICAs with < 50% stenosis, so Plavix was added to pt's daily home regimen. Concerned for TIA at this time though Migraine very high on differential due to pt's hx headaches, including presently this admission. Hypertensive urgency/emergency also considered due to pt's very elevated levels with EMS. Per chart review, Eliquis was a new medication since 4/3/18 (had switched from Coumadin), however staff Faraz concerned that pt had a potential event while on Eliquis. She wished to switch to Pradaxa as an alternative DOAC (as it has an option for reversal), however changed to Xarelto per pt's insurance coverage.   While admitted, pt given cocktail for HA which she has received previously at the infusion clinic - IV Magnesium, IM Toradol, 2mg IV Morphine, 500cc NS bolus (IV Benadryl not included this time as pt  had received a dose earlier that day prior to contrast imaging.) HA improved somewhat and pt was encouraged to follow up with Dr. Cortez for continued management of botox injections, etc. At that time, pt also found with hyponatremia; d/c'd Clorthalidone and plan was made for pt to [facility] 4[facility]16[facility]18 for repeat CMP to evaluate Na level and BP check since discontinuing this medication.  Ms. Chawla was evaluated and treated by PT, OT, and SLP who recommend d/c with outpatient PT and OT. She was discharged home 4/12/18 with [facility] follow-up Monday      Prior note:   2 weeks ago BOTOX effect wore off   Used up all her percocet   3 wks h/o:   Reports frequent falls - falling forward, no LOC, no injuries, able to protect falls by hands   triggers - unknown   Also occ stumbling   Dragging L foot   No Pain in back or legs   No numbness or weakness in legs   No B/B incontinence   No lightheadedness      Prior note:    Flare up of TMJ and HA during daughter's wedding   NSAIDS helped   2 weeks ago BOTOX effect wore off      Prior note:   2 weeks ago BOTOX effect wore off   Has severe spasm and pain in the traps catalina   Weather change has provoked severe migraine + nausea   She started coumadin       Prior note:   3 weeks ago BOTOX effect wore off   She reports severe daily HA    During BOTOX periods she feels she  her HA. Much less intense      Prior note:   The patient is a 51-year-old female who [facility]. Since her last visit, she reports growing frustration   about the fact that nothing has helped her with regards to her headaches. She   continues to experience daily headaches. She takes mefenamic acid and   tizanidine every night, which only provides her two hours of relief. She is   unable to sleep because of the refractory headaches. She is incapacitated   because of severe headaches. She reports minimal relief with infusions  "that she  gets on a periodic basis. She does report an improvement overall with the   Botox. However, this effect has worn off in the last two weeks. She also   reports an episode wherein she fell with loss of consciousness, which was not   provoked. As a result, she injured her ankle and is currently wearing a boot.      Prior note:   since last week - Reports vertigo for 6 - 7 minutes upto 3 times daily   Nearly daily severe HA - no response to ponstel , compazine   She is late for her BOTOX - last 2 weeks were painful       Follow up:   R sided Headache is constant max at TMJ on R   Prior note:   She reports new episodes of R face tingling. Sh also reports 2 episodes of blurred vision lasting 15 minutes. These hapended while watching TV. Her pain remains unchanged   Follow up:   2 days of severe HA during Thanksgiving   Pounding bifrontal region   Pain worse over L eye brow   Follow up:   Reports bifrontal severe HA (worse on L) with no nausea with sudden onset . Occurs daily   NO note:  I found no papilledema or other changes to suggest elevated intracranial pressure or other alternative etiology for her headaches. Repeat exam in two years.  HA are less frequent and less intense.   (R levator scapula spasm)   symptoms better with lateral flexion to L   Prior note:   She still has daily HA with severe sharp stabbing pain behind L eye lasting for 15 sec   Prior note:   Daily pain. 2/week - nausea.  Shu Lares RN at 9/17/2014 4:53 PM  Pt presented to clinic for medical advice. Pt is seen by Dr. Paredes and Dr. Cortez.  1) She went ER on 9/13/14 for a migraine. She was given Reglan, Benadryl, MSO4 and IVF. A couple days later she developed an all over body "tremor". She states "I think I have Parkinson's". - Per Dr. Paredes, medication related tremor (Reglan & Benadryl). Informed her it should wear off in a few days. If not, she is to call and let us know.  2) Headaches - triggered by insomnia.   Current " "meds:  Ketoprofen - she took it once and said her "throat closed up" and she's not supposed to have anything with aspirin in it.  Nortriptyline - she was taking it at night, but it didn't help her sleep, so she stopped it.  Per Dr. Cortez, discontinue Ketoprofen and Nortriptyline. Start Effexor XR 37.5mg QD, tizanidine 4mg BID PRN headache and Klonopin 0.25 - 0.5mg QHS PRN. Will schedule pt for sphenocath procedure within the next week.   Prior note:   48 yo woman with history of HTN and asthma, both reportedly controlled, in hospital early 2013 after "passed out" and became unresponsive. CPR in the field for 8 minutes until EMS arrived. Per ED note, on arrival she was found to be in PEA, given epinephrine. Vfib/Vtach was achieved which was shocked x1. Patient converted to sinus tachycardia after shock. I do not know the time course for the above treatment. On arrival to facility patient was in sinus tachycardia with strong pulses, intubated and unresponsive. ET tube placement was confirmed with direct laryngoscopy and good bilateral breath sounds were heard after the tube was pulled back 2 cm. Reported to have "withdrawal" movements in ER during stabilization, then sedated and cooling protocol initiated. Had remarkable   recovery and first seen in clinic in April 2013.   Headache history: cluster   Age of onset - 2012   Location - behind L eye   Nature of pain - sharp   Prodrome - no   Aura - No   Duration of headache - 6-7 min   Time to peak intensity -   Pain scale - range of intensity - 9/10   Frequency - daily   Status Migrainosus history - 1-3 days   Time of day of most headaches- anytime   Associated symptoms with the headache:   Red eyes   swelling of L face   Headache history: Migraine   Age of onset - 2012   Location - bifrontal   Nature of pain - Pounding   Prodrome - no   Aura - No   Duration of headache - > 4 hrs   Time to peak intensity -   Pain scale - range of intensity - 9/10   Frequency - 5/week " "  Status Migrainosus history - 1-3 days   Time of day of most headaches- anytime   Associated symptoms with the headache:   Meningeal symptoms - photophobia, phonophobia, exercise intolerance +   Nausea/vomitting +   Nasal drainage   Visual blurriness +   Pallor/flushing   Dizziness   Vertigo   Confusion   Impaired concentration +   Pain worsened with physical activity +   Neck pain +   Symptoms of increased intracranial pressure:   Whooshing sounds - no   Visual spots/blotches - no   Headache Triggers: unknown   Other comorbid conditions:   Anxiety - no   Motion sickness symptom - no       Treatment history:   Resolution of headache with sleep - yes       Meds tried:   Trigger points involvin/9/15 helped for 1 day   Site: Right   Levator scapula   Pharmacological agent:   0.5% Sensorcaine solution   No complications were noted.  Supraorbital block - helped for 2 days   Tylenol - not help   imitrex - chest tightness   alleve - help   topamax - not helping   Neurontin - not helping   Paxil, Elavil - not help   seroquel - nightmare   Ketoprofen - she took it once and said her "throat closed up" and she's not supposed to have anything with aspirin in it.  Flurbiprofen - constipation   Nortriptyline - she was taking it at night, but it didn't help her sleep, so she stopped it.  reglan - parkinsonism   zanaflex - help   Toradol 30 mg IM inj at home - too expensive   BOTOX round #1 9/3/15 - helped (R levator scapula spasm)   Round #2 12/3/15 - helped   Round#3 3/316 - helped   Round #4 16 - helped   BOTOX#5 9/15/16 - helped     BOTOX#6 - 16 - helped   BOTOX#7 - 3/9/17 - helped    BOTOX#8 - 17 - helped    BOTOX#9 8/10/17 - helped   BOTOX#10 10/19/17 - helped   BOTOX#11 17 - helped   BOTOX#12 3/7/18 - helped   BOTOX#13 18 - helped    BOTOX#14 18 - helped     BOTOX#15 10/19/18 - helped      BOTOX#16 18 - helped   BOTOX#17 3/13/19 - helped    BOTOX#18 19 - helped     BOTOX#19 " 8/1/19 - helped      BOTOX#20 10/11/19 - helped     BOTOX#21 12/19/19 - helped   BOTOX#22 3/10/20 - helped    BOTOX#23 6/26/20 - helped   BOTOX#24 11/12/20 - helped  BOTOX#25 1/21/21 - helped   BOTOX#26 4/22/21 - helped   BOTOX#27 7/6/21 - helped    BOTOX#28 12/10/21 - helped     BOTOX#29 5/19/22 - helped   BOTOX#30 8/25/22 - helped  BOTOX#31 12/2/22 - helped  BOTOX#32 3/1/23 - helped  BOTOX#33 7/6/23 - helped  BOTOX#34 9/28/23 - helped  BOTOX#35 1/4/24 - helped  BOTOX#36 5/1/24 - helped  BOTOX#37 7/18/24 - helped  BOTOX#38 10/10/24 - helped    AIMOVIG (sept 1rst week, Oct first week, Nov first wk 140 mg, Dec first wk 140 mg, Jan, Feb) no benefit   Constipation +      Can not do RFA - pt has pacemaker   Procedure: IOVERA peripheral nerve focus cold therapy (non ablative cryotherapy) 12/30/15 - not help   Indication: Cryotherapy of select peripheral nerves of the head was performed to treat chronic headaches that failed to respond to several preventive pharmacological therapies.   Sedation: None   Informed consent: The procedure, risks, benefits and options were discussed with the patient. There are no contraindications to the procedure. The patient expressed understanding and agreed to the procedure. I verify that I personally obtained the patient's consent prior to the start of the procedure.  Location:  Bilateral Supra orbital nerve (3 cycles on R and 1 cycle on L)   Bilateral Occipital nerve   3 cycles   Pain relief: Pain score reduced 10/10->0/10   9/26 Bilateral Sphenopalatine Ganglion and bilateral Maxillary Branch (Trigeminal Nerve) Block - no help   ONB - not help     georges Pagan RN at 12/31/2014 3:54 PM                           Status: Signed                                       Expand All Collapse All   HEADACHE FASTTRACK  PAIN 7/10 NAUSEA 0/10  ROUND 1: TORADOL, IMITREX, MORPHINE, BENADRYL, AND MAGNESIUM  PAIN 7/10 NAUSEA 0/10  PT STATED SHE WAS READY TO GO HOME AND GO TO SLEEP. PT D/C'ED IN NAD                               Shannon Pagan RN at 10/5/2015 12:49 PM                           Status: Signed                                       Expand All Collapse All   HEADACHE FASTTRACK  PAIN 8/10 NAUSEA 10/10  FIRST ROUND: tORADOL, BENADRYL, COMPAZINE, IMITREX, MAGNESIUM, AND VALPROATE.  PAIN 1/10 NAUSEA 0/10  PT READY TO GO HOME AND FEELING BETTER. PT LEFT IN NAD WITH FAMILY MEMBER  TOTAL TIME: 8358-2927                         Shannon Pagan RN at 10/20/2015 3:05 PM                           Status: Signed                                       Expand All Collapse All   HEADACHE FASTTRACK  PAIN 10/10 NAUSEA 0/10  FIRST ROUND: tORADOL, BENADRYL, COMPAZINE, IMITREX, MAGNESIUM, AND VALPROATE.  BP BEING MONITORED EVERY 15 MIN AND REMAINED 170'S/80-90'S. DR CHOPRA NOTIFIED AND STATED TO MAKE SURE BP DID NOT EXCEED 180 SYSTOLIC OR PT SHOULD REPORT TO ED. BP AT 1500 183/92. DR CHOPRA NOTIFIED AND GAVE ORDER TO STOP INFUSION AND SEND PATIENT TO ED. PT BROUGHT TO ED BY INFUSION NURSE VIA WHEELCHAIR.   PAIN 8/10 NAUSEA 0/10  TOTAL TIME: 5701-4031                                                     Progress Notes                                               Shannon Pagan RN at 2/24/2016 1:36 PM       Status: Signed                  Expand All Collapse All   HEADACHE FASTTRACK  PAIN 10/10 NAUSEA 7/10  FIRST ROUND: tORADOL, BENADRYL, AND MORPHINE-BP RANGING FROM 177-203/84-92, HR 60-82  MD NOTIFIED AND GAVE ORDERS TO SEND TO ED. PT LEFT VIA W/C WITH INFUSION NURSE ON WAY TO ED.            Headache risk factors:   H/o TBI - [facility] (2013) + neck sprain   H/o Meningitis - no   F/h Aneurysms - no       Review of Systems   Constitutional: Negative.   HENT: Negative.   Eyes: Negative.   Respiratory: Negative.   Cardiovascular: Negative.   Gastrointestinal: Negative.   Endocrine: Negative.   Genitourinary: Negative.   Musculoskeletal: Negative.   Skin: Negative.   Allergic/Immunologic: Negative.   Hematological: Negative.    Psychiatric/Behavioral: insomnia      Objective:     Physical Exam   Constitutional: She is oriented to person, place, and time. She appears well-developed.   obesity   Psychiatric: She has a normal mood and flat affect. Her behavior is normal. Judgment and thought content normal.   Headache Exam:  Optic disc is flat OU   Temples appear symmetric  No V1,V2,V3 distribution allodynia/hyperalgesia catalina   No facial swelling  No gross TMJ abnormalities   No ptosis   No conj injection   No meningismus   No neck stiffness  No C spine tenderness  Cervical facet tenderness on palpation catalina   No tender points over trapezium   catalina supraorbital nerve exit point tenderness  catalina occipital nerve exit point tenderness  No auriculotemporal nerve tenderness   Tenderness of levator scapula catalina    Gait - wide based gait                       Assessment:                              1.  Occipital neuralgia                              2.  Cervicalgia                              3.  4  5  6 Chronic migraine without aura, with intractable migraine, so stated, with status migrainosus (post traumatic) post concussive?  Depression   TMJ - R sided   Insomnia - SHIRA      Head CT  Findings: There is no evidence of acute or recent major vascular territory cerebral infarction, parenchymal hemorrhage, or intra-axial mass.  There are no extra-axial masses or abnormal fluid collections.  The ventricular system is within normal limits of size for age and shows no distortion by mass-effect or evidence of hydrocephalus.  There is no fracture or destructive osseous lesion.  The extracranial soft tissues are unremarkable.  The visualized paranasal sinuses and mastoid air cells are clear.   Impression    No acute intracranial abnormality.  ______________________________________     Electronically signed by resident: KRISSY MAYERS MD  Date: 03/14/16  Time: 17:09            Plan:                              1. Prophylactic medication -   Despiramine 25  mg AM (for dizziness) PRN  zoloft 25 mg daily    Aricept 20 mg daily   Neurontin 900 mg TID    Magnesium 200 mg daily  Topamax 200 mg BID   Clonazepam BID for anxiety PRN  On trazodone   Cont B2, B6 supplements  On bellsomra    2, Breakthrough headache - zanaflex 8 mg Q8, etodolac - insurance will not pay for no clinical reason   Diclofenac   Multiple alternative treatment options were offered to the patient   3. Refrain from over counter pain medications. Discussed medication overuse headache.cont Magnesium 400 mg PO BID   4. Occipital neuralgia - If medical management is ineffective may consider occipital nerve blocks.   5. I again urged the patient to keep a headache calender.    f/up Dr. Rock for TBI   Vit D supplements    f/up sleep clinic - CPAP - waiting for new machine   Cont AJOVY (April 2019- ) - gap Feb-April 2021)  No side effects     Consider  ONB 2 weeks prior to next BOTOX       BOTOX treatment response:   Prior to initiating BOTOX, the patient had 28   migraine days per month on average. This meets criteria for chronic migraine.   After starting BOTOX, the patient experienced a reduction in  25  days per month   After starting BOTOX, the patient experienced a reduction in > 100 hours of migraine symptoms per month   After starting BOTOX, the patient experienced a 50% reduction in burden of migraine days per month   Based on this information, BOTOX is medically necessary for the management of the patient's chronic migraine.     BOTOX Injection intervals:   Patient reports a 'wearing off effect' prior to the subsequent BOTOX injections at 12 weeks. This occurs at week 9-10  Symptoms of this a 'wearing off effect' include - worsening of migraine headache frequency and intensity   Medications used during the 'wearing off effect' period include flurbiprofen, zanaflex  Based on this information, it is medically necessary for the patient to receive BOTOX therapy at an interval of every 10 weeks for the  management of chronic migraine.    BOTOX was performed as an indicated therapy for intractable chronic migraine headaches given that the patient failed > 5 prophylactic medications  Botulinum Toxin Injection Procedure   Pre-operative diagnosis: Chronic migraine   Post-operative diagnosis: Same   Procedure: Chemical neurolysis   After risks and benefits were explained including bleeding, infection, worsening of pain, damage to the areas being injected, weakness of muscles, loss of muscle control, dysphagia if injecting the head or neck, facial droop if injecting the facial area, painful injection, allergic or other reaction to the medications being injected, and the failure of the procedure to help the problem, a signed consent was obtained.   The patient was placed in a comfortable area and the sites to be treated were identified.The area to be treated was prepped three times with alcohol and the alcohol allowed to dry. Next, a 30 gauge needle was used to inject the medication in the area to be treated.   Area(s) injected:   Total Botox used: 155 Units   Botox wastage: 45 Units   Injection sites:     muscle bilaterally ( a total of 5 units divided into 2 sites)   Procerus muscle (5 units)   Frontalis muscle bilaterally (a total of 20 units divided into 4 sites)   Temporalis muscle bilaterally (a total of 50 units divided into 8 sites)   Occipitalis muscle bilaterally (a total of 30 units divided into 6 sites)   Cervical paraspinal muscles (a total of 20 units divided into 4 sites)   Trapezius muscle bilaterally (a total of 30 units divided into 6 sites)     Complications: none       RTC for the next Botox injection: 10 weeks    Procedures

## 2025-01-08 ENCOUNTER — CLINICAL SUPPORT (OUTPATIENT)
Dept: CARDIOLOGY | Facility: HOSPITAL | Age: 60
End: 2025-01-08
Attending: INTERNAL MEDICINE
Payer: MEDICARE

## 2025-01-08 ENCOUNTER — CLINICAL SUPPORT (OUTPATIENT)
Dept: CARDIOLOGY | Facility: HOSPITAL | Age: 60
End: 2025-01-08
Payer: MEDICARE

## 2025-01-08 DIAGNOSIS — Z95.810 PRESENCE OF AUTOMATIC (IMPLANTABLE) CARDIAC DEFIBRILLATOR: ICD-10-CM

## 2025-01-08 PROCEDURE — 93295 DEV INTERROG REMOTE 1/2/MLT: CPT | Mod: ,,, | Performed by: INTERNAL MEDICINE

## 2025-01-08 PROCEDURE — 93296 REM INTERROG EVL PM/IDS: CPT | Performed by: INTERNAL MEDICINE

## 2025-01-10 ENCOUNTER — HOSPITAL ENCOUNTER (EMERGENCY)
Facility: HOSPITAL | Age: 60
Discharge: HOME | End: 2025-01-10
Attending: EMERGENCY MEDICINE
Payer: MEDICARE

## 2025-01-10 VITALS
TEMPERATURE: 98 F | WEIGHT: 289 LBS | RESPIRATION RATE: 18 BRPM | HEIGHT: 64 IN | DIASTOLIC BLOOD PRESSURE: 88 MMHG | BODY MASS INDEX: 49.34 KG/M2 | SYSTOLIC BLOOD PRESSURE: 167 MMHG | OXYGEN SATURATION: 98 % | HEART RATE: 83 BPM

## 2025-01-10 DIAGNOSIS — S09.90XA CLOSED HEAD INJURY, INITIAL ENCOUNTER: ICD-10-CM
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
## 2025-01-10 DIAGNOSIS — T07.XXXA MULTIPLE ABRASIONS: ICD-10-CM

## 2025-01-10 PROCEDURE — 99285 EMERGENCY DEPT VISIT HI MDM: CPT | Mod: 25
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
## 2025-01-14 ENCOUNTER — CLINICAL SUPPORT (OUTPATIENT)
Dept: CARDIOLOGY | Facility: HOSPITAL | Age: 60
End: 2025-01-14
Attending: INTERNAL MEDICINE
Payer: MEDICARE

## 2025-01-14 ENCOUNTER — OFFICE VISIT (OUTPATIENT)
Dept: ELECTROPHYSIOLOGY | Facility: CLINIC | Age: 60
End: 2025-01-14
Payer: MEDICARE

## 2025-01-14 ENCOUNTER — HOSPITAL ENCOUNTER (OUTPATIENT)
Dept: CARDIOLOGY | Facility: CLINIC | Age: 60
Discharge: HOME | End: 2025-01-14
Payer: MEDICARE

## 2025-01-14 ENCOUNTER — HOSPITAL ENCOUNTER (OUTPATIENT)
Dept: CARDIOLOGY | Facility: HOSPITAL | Age: 60
Discharge: HOME | End: 2025-01-14
Attending: NURSE PRACTITIONER
Payer: MEDICARE

## 2025-01-14 VITALS
WEIGHT: 292.75 LBS | DIASTOLIC BLOOD PRESSURE: 55 MMHG | HEART RATE: 66 BPM | BODY MASS INDEX: 49.98 KG/M2 | HEIGHT: 64 IN | SYSTOLIC BLOOD PRESSURE: 115 MMHG

## 2025-01-14 VITALS
WEIGHT: 291 LBS | HEIGHT: 63 IN | BODY MASS INDEX: 51.56 KG/M2 | DIASTOLIC BLOOD PRESSURE: 55 MMHG | HEART RATE: 66 BPM | SYSTOLIC BLOOD PRESSURE: 115 MMHG

## 2025-01-14 DIAGNOSIS — I48.0 PAROXYSMAL ATRIAL FIBRILLATION: ICD-10-CM

## 2025-01-14 DIAGNOSIS — Z86.73 HISTORY OF CVA (CEREBROVASCULAR ACCIDENT): ICD-10-CM

## 2025-01-14 DIAGNOSIS — Z95.810 BIVENTRICULAR AUTOMATIC IMPLANTABLE CARDIOVERTER DEFIBRILLATOR IN SITU: Primary | ICD-10-CM

## 2025-01-14 DIAGNOSIS — I42.8 NONISCHEMIC CARDIOMYOPATHY: ICD-10-CM

## 2025-01-14 DIAGNOSIS — G47.33 OBSTRUCTIVE SLEEP APNEA: ICD-10-CM

## 2025-01-14 DIAGNOSIS — I10 ESSENTIAL HYPERTENSION: ICD-10-CM

## 2025-01-14 DIAGNOSIS — I44.2 COMPLETE AV BLOCK: ICD-10-CM

## 2025-01-14 DIAGNOSIS — Z79.01 LONG TERM (CURRENT) USE OF ANTICOAGULANTS: ICD-10-CM

## 2025-01-14 DIAGNOSIS — Z95.810 BIVENTRICULAR AUTOMATIC IMPLANTABLE CARDIOVERTER DEFIBRILLATOR IN SITU: ICD-10-CM

## 2025-01-14 LAB
ASCENDING AORTA: 3.4 CM
AV AREA BY CONTINUOUS VTI: 2.8 CM2
AV INDEX (PROSTH): 0.56
AV LVOT MEAN GRADIENT: 1 MMHG
AV LVOT PEAK GRADIENT: 3 MMHG
AV MEAN GRADIENT: 5.4 MMHG
AV PEAK GRADIENT: 10.2 MMHG
AV VALVE AREA BY VELOCITY RATIO: 2.5 CM²
AV VALVE AREA: 2.7 CM2
AV VELOCITY RATIO: 0.5
BSA FOR ECHO PROCEDURE: 2.42 M2
CV ECHO LV RWT: 0.36 CM
DOP CALC AO PEAK VEL: 1.6 M/S
DOP CALC AO VTI: 33.2 CM
DOP CALC LVOT AREA: 4.9 CM2
DOP CALC LVOT DIAMETER: 2.5 CM
DOP CALC LVOT PEAK VEL: 0.8 M/S
DOP CALC LVOT STROKE VOLUME: 90.8 CM3
DOP CALCLVOT PEAK VEL VTI: 18.5 CM
E WAVE DECELERATION TIME: 285.47 MS
E/A RATIO: 3.04
E/E' RATIO: 11.23 M/S
ECHO EF ESTIMATED: 41 %
ECHO LV POSTERIOR WALL: 0.8 CM (ref 0.6–1.1)
EJECTION FRACTION: 53 %
FRACTIONAL SHORTENING: 20.5 % (ref 28–44)
INTERVENTRICULAR SEPTUM: 0.8 CM (ref 0.6–1.1)
IVC DIAMETER: 1.72 CM
IVRT: 117.98 MS
LA MAJOR: 6.31 CM
LA MINOR: 5.72 CM
LA WIDTH: 4.57 CM
LEFT ATRIUM VOLUME INDEX MOD: 41.2 ML/M2
LEFT ATRIUM VOLUME MOD: 93.6 ML
LEFT INTERNAL DIMENSION IN SYSTOLE: 3.5 CM (ref 2.1–4)
LEFT VENTRICLE DIASTOLIC VOLUME INDEX: 39.08 ML/M2
LEFT VENTRICLE DIASTOLIC VOLUME: 88.72 ML
LEFT VENTRICLE MASS INDEX: 48.2 G/M2
LEFT VENTRICLE SYSTOLIC VOLUME INDEX: 23 ML/M2
LEFT VENTRICLE SYSTOLIC VOLUME: 52.22 ML
LEFT VENTRICULAR INTERNAL DIMENSION IN DIASTOLE: 4.4 CM (ref 3.5–6)
LEFT VENTRICULAR MASS: 109.4 G
LV LATERAL E/E' RATIO: 10.43
LV SEPTAL E/E' RATIO: 12.17
MV A" WAVE DURATION": 194.1 MS
MV PEAK A VEL: 0.24 M/S
MV PEAK E VEL: 0.73 M/S
OHS CV RV/LV RATIO: 0.89 CM
OHS QRS DURATION: 156 MS
OHS QTC CALCULATION: 501 MS
PISA TR MAX VEL: 2.6 M/S
PULM VEIN A" WAVE DURATION": 194.1 MS
PULM VEIN S/D RATIO: 1.31
PULMONIC VEIN PEAK A VELOCITY: 0.2 M/S
PV PEAK D VEL: 0.35 M/S
PV PEAK S VEL: 0.46 M/S
RA MAJOR: 5.4 CM
RA PRESSURE ESTIMATED: 8 MMHG
RA WIDTH: 4.12 CM
RIGHT ATRIAL AREA: 18.4 CM2
RIGHT VENTRICLE DIASTOLIC BASEL DIMENSION: 3.9 CM
RV TB RVSP: 11 MMHG
RV TISSUE DOPPLER FREE WALL SYSTOLIC VELOCITY 1 (APICAL 4 CHAMBER VIEW): 10.07 CM/S
SINUS: 3.13 CM
STJ: 3.01 CM
TDI LATERAL: 0.07 M/S
TDI SEPTAL: 0.06 M/S
TDI: 0.07 M/S
TR MAX PG: 27 MMHG
TRICUSPID ANNULAR PLANE SYSTOLIC EXCURSION: 1.85 CM
TV PEAK GRADIENT: 27 MMHG
TV REST PULMONARY ARTERY PRESSURE: 35 MMHG
Z-SCORE OF LEFT VENTRICULAR DIMENSION IN END DIASTOLE: -6.51
Z-SCORE OF LEFT VENTRICULAR DIMENSION IN END SYSTOLE: -2.98

## 2025-01-14 PROCEDURE — 93005 ELECTROCARDIOGRAM TRACING: CPT | Mod: PBBFAC | Performed by: INTERNAL MEDICINE

## 2025-01-14 PROCEDURE — 1160F RVW MEDS BY RX/DR IN RCRD: CPT | Mod: CPTII,S$GLB,, | Performed by: NURSE PRACTITIONER

## 2025-01-14 PROCEDURE — 93010 ELECTROCARDIOGRAM REPORT: CPT | Mod: S$GLB,,, | Performed by: INTERNAL MEDICINE

## 2025-01-14 PROCEDURE — 3078F DIAST BP <80 MM HG: CPT | Mod: CPTII,S$GLB,, | Performed by: NURSE PRACTITIONER

## 2025-01-14 PROCEDURE — 3074F SYST BP LT 130 MM HG: CPT | Mod: CPTII,S$GLB,, | Performed by: NURSE PRACTITIONER

## 2025-01-14 PROCEDURE — 93306 TTE W/DOPPLER COMPLETE: CPT

## 2025-01-14 PROCEDURE — 93284 PRGRMG EVAL IMPLANTABLE DFB: CPT | Mod: 26,,, | Performed by: INTERNAL MEDICINE

## 2025-01-14 PROCEDURE — 3008F BODY MASS INDEX DOCD: CPT | Mod: CPTII,S$GLB,, | Performed by: NURSE PRACTITIONER

## 2025-01-14 PROCEDURE — 99999 PR PBB SHADOW E&M-EST. PATIENT-LVL V: CPT | Mod: PBBFAC,,, | Performed by: NURSE PRACTITIONER

## 2025-01-14 PROCEDURE — 93005 ELECTROCARDIOGRAM TRACING: CPT | Mod: S$GLB,,, | Performed by: INTERNAL MEDICINE

## 2025-01-14 PROCEDURE — 99214 OFFICE O/P EST MOD 30 MIN: CPT | Mod: S$GLB,,, | Performed by: NURSE PRACTITIONER

## 2025-01-14 PROCEDURE — 93306 TTE W/DOPPLER COMPLETE: CPT | Mod: 26,,, | Performed by: INTERNAL MEDICINE

## 2025-01-14 PROCEDURE — 1159F MED LIST DOCD IN RCRD: CPT | Mod: CPTII,S$GLB,, | Performed by: NURSE PRACTITIONER

## 2025-01-14 PROCEDURE — 93284 PRGRMG EVAL IMPLANTABLE DFB: CPT

## 2025-01-16 ENCOUNTER — OFFICE VISIT (OUTPATIENT)
Dept: FAMILY MEDICINE | Facility: CLINIC | Age: 60
End: 2025-01-16
Payer: MEDICARE

## 2025-01-16 ENCOUNTER — TELEPHONE (OUTPATIENT)
Dept: ADMINISTRATIVE | Facility: CLINIC | Age: 60
End: 2025-01-16
Payer: MEDICARE

## 2025-01-16 DIAGNOSIS — E66.813 CLASS 3 SEVERE OBESITY DUE TO EXCESS CALORIES WITH SERIOUS COMORBIDITY AND BODY MASS INDEX (BMI) OF 50.0 TO 59.9 IN ADULT: ICD-10-CM

## 2025-01-16 DIAGNOSIS — R26.9 ABNORMALITY OF GAIT AND MOBILITY: ICD-10-CM

## 2025-01-16 DIAGNOSIS — S06.9XAS COGNITIVE DEFICIT AS LATE EFFECT OF TRAUMATIC BRAIN INJURY: ICD-10-CM

## 2025-01-16 DIAGNOSIS — I48.0 PAROXYSMAL ATRIAL FIBRILLATION: ICD-10-CM

## 2025-01-16 DIAGNOSIS — Z00.00 ENCOUNTER FOR PREVENTIVE HEALTH EXAMINATION: Primary | ICD-10-CM

## 2025-01-16 DIAGNOSIS — E23.6 EMPTY SELLA: ICD-10-CM

## 2025-01-16 DIAGNOSIS — I44.2 COMPLETE AV BLOCK: ICD-10-CM

## 2025-01-16 DIAGNOSIS — I42.8 NONISCHEMIC CARDIOMYOPATHY: ICD-10-CM

## 2025-01-16 DIAGNOSIS — I70.0 AORTIC ATHEROSCLEROSIS: ICD-10-CM

## 2025-01-16 DIAGNOSIS — R41.89 COGNITIVE DEFICIT AS LATE EFFECT OF TRAUMATIC BRAIN INJURY: ICD-10-CM

## 2025-01-16 DIAGNOSIS — F33.1 DEPRESSION, MAJOR, RECURRENT, MODERATE: ICD-10-CM

## 2025-01-16 DIAGNOSIS — E66.01 CLASS 3 SEVERE OBESITY DUE TO EXCESS CALORIES WITH SERIOUS COMORBIDITY AND BODY MASS INDEX (BMI) OF 50.0 TO 59.9 IN ADULT: ICD-10-CM

## 2025-01-16 DIAGNOSIS — I69.354 HEMIPARESIS AFFECTING LEFT SIDE AS LATE EFFECT OF STROKE: ICD-10-CM

## 2025-01-16 PROBLEM — R53.82 CHRONIC FATIGUE: Status: RESOLVED | Noted: 2020-05-25 | Resolved: 2025-01-16

## 2025-01-16 PROCEDURE — G0439 PPPS, SUBSEQ VISIT: HCPCS | Mod: 95,,,

## 2025-01-16 NOTE — PROGRESS NOTES
The patient location is: [place]  The chief complaint leading to consultation is: Medicare Wellness Visit       Visit type: audiovisual     Face to Face time with patient: 25  60 minutes of total time spent on the encounter, which includes face to face time and non-face to face time preparing to see the patient (eg, review of tests), Obtaining and/or reviewing separately obtained history, Documenting clinical information in the electronic or other health record, Independently interpreting results (not separately reported) and communicating results to the patient/family/caregiver, or Care coordination (not separately reported).            Each patient to whom he or she provides medical services by telemedicine is:  (1) informed of the relationship between the physician and patient and the respective role of any other health care provider with respect to management of the patient; and (2) notified that he or she may decline to receive medical services by telemedicine and may withdraw from such care at any time.      Amna Chawla presented for a  Medicare AWV and comprehensive Health Risk Assessment today. The following components were reviewed and updated:    Medical history  Family History  Social history  Allergies and Current Medications  Health Risk Assessment  Health Maintenance  Care Team         ** See Completed Assessments for Annual Wellness Visit within the encounter summary.**         The following assessments were completed:  Living Situation  CAGE  Depression Screening  Cognitive Function Screening  Nutrition Screening  ADL Screening  PAQ Screening      Opioid documentation for eAWV      Patient does not have a current opioid prescription.            Review for Substance Use Disorders: Patient does not use substance        Current Outpatient Medications:     albuterol (VENTOLIN HFA) 90 mcg/actuation inhaler, Inhale 2 puffs into the lungs every 6 (six) hours as needed for Wheezing. Rescue, Disp: 18 g,  Rfl: 0    amLODIPine (NORVASC) 5 MG tablet, Take 1 tablet (5 mg total) by mouth once daily., Disp: 90 tablet, Rfl: 3    ARIPiprazole (ABILIFY) 15 MG Tab, Take 1 tablet (15 mg total) by mouth once daily., Disp: 90 tablet, Rfl: 3    carvediloL (COREG) 25 MG tablet, Take 2 tablets (50 mg total) by mouth 2 (two) times daily with meals., Disp: 360 tablet, Rfl: 3    cetirizine (ZYRTEC) 10 MG tablet, Take 1 tablet (10 mg total) by mouth once daily. for 7 days, Disp: , Rfl:     clonazePAM (KLONOPIN) 1 MG tablet, TAKE 1 TABLET BY MOUTH DAILY AS NEEDED FOR ANXIETY, Disp: 30 tablet, Rfl: 5    cyanocobalamin, vitamin B-12, 1,000 mcg Subl, Place 1,000 mcg under the tongue once daily., Disp: 90 tablet, Rfl: 3    donepezil (ARICEPT) 10 MG tablet, Take 1 tablet (10 mg total) by mouth 2 (two) times daily., Disp: 180 tablet, Rfl: 3    EPINEPHrine (EPIPEN) 0.3 mg/0.3 mL AtIn, Inject 0.3 mLs (0.3 mg total) into the muscle once. for 1 dose, Disp: 0.3 mL, Rfl: 1    flurbiprofen (ANSAID) 100 MG tablet, Take 1 tablet (100 mg total) by mouth 2 (two) times daily as needed (migraine)., Disp: 12 tablet, Rfl: 12    fluticasone propionate (FLONASE) 50 mcg/actuation nasal spray, 1 spray (50 mcg total) by Each Nostril route once daily., Disp: 9.9 mL, Rfl: 0    fremanezumab-vfrm (AJOVY) 225 mg/1.5 mL injection, Inject 1.5 mLs (225 mg total) into the skin every 28 days., Disp: 1 each, Rfl: 12    furosemide (LASIX) 20 MG tablet, Take 1 tablet (20 mg total) by mouth every other day., Disp: 45 tablet, Rfl: 3    gabapentin (NEURONTIN) 300 MG capsule, Take 1 capsule (300 mg total) by mouth 3 (three) times daily., Disp: 270 capsule, Rfl: 1    losartan (COZAAR) 100 MG tablet, Take 1 tablet (100 mg total) by mouth once daily., Disp: 90 tablet, Rfl: 3    magnesium 200 mg Tab, Take 200 mg by mouth once daily., Disp: 30 each, Rfl: 12    mupirocin (BACTROBAN) 2 % ointment, Apply topically 3 (three) times daily., Disp: 22 g, Rfl: 0    omeprazole (PRILOSEC) 40 MG  capsule, Take 1 capsule (40 mg total) by mouth once daily. Take 30 minutes before breakfast, Disp: 90 capsule, Rfl: 3    ondansetron (ZOFRAN-ODT) 4 MG TbDL, DISSOLVE 1 TABLET(4 MG) ON THE TONGUE EVERY 12 HOURS AS NEEDED FOR NAUSEA, Disp: 40 tablet, Rfl: 12    polyethylene glycol (GLYCOLAX) 17 gram PwPk, Take 17 g by mouth once daily., Disp: 20 each, Rfl: 12    rivaroxaban (XARELTO) 20 mg Tab, Take 1 tablet (20 mg total) by mouth daily with dinner or evening meal., Disp: 30 tablet, Rfl: 11    rosuvastatin (CRESTOR) 40 MG Tab, Take 1 tablet (40 mg total) by mouth every evening., Disp: 90 tablet, Rfl: 3    sertraline (ZOLOFT) 100 MG tablet, Take 2 tablets (200 mg total) by mouth once daily., Disp: 180 tablet, Rfl: 3    tiaGABine (GABITRIL) 4 MG tablet, Take 1 tablet (4 mg total) by mouth every evening., Disp: 30 tablet, Rfl: 12    tiZANidine (ZANAFLEX) 4 MG tablet, Take 1 tablet (4 mg total) by mouth 3 (three) times daily as needed (muscle pain)., Disp: 40 tablet, Rfl: 12    tiZANidine (ZANAFLEX) 4 MG tablet, Take 1 tablet (4 mg total) by mouth 3 (three) times daily as needed (muscle pain)., Disp: 40 tablet, Rfl: 12    ubrogepant (UBROGEPANT) 100 mg tablet, Take 1 tablet (100 mg total) by mouth 2 (two) times daily as needed for Migraine., Disp: 10 tablet, Rfl: 12    zolpidem (AMBIEN) 10 mg Tab, Take 1 tablet (10 mg total) by mouth nightly as needed (insomnia)., Disp: 30 tablet, Rfl: 5    Current Facility-Administered Medications:     cyanocobalamin tablet 100 mcg, 100 mcg, Oral, 1 time in Clinic/HOD,     onabotulinumtoxina injection 200 Units, 200 Units, Intramuscular, Q90 Days, David Cortez MD, 200 Units at 10/19/18 1713    onabotulinumtoxina injection 200 Units, 200 Units, Intramuscular, Q90 Days, David Cortez MD, 200 Units at 12/28/18 1106    onabotulinumtoxina injection 200 Units, 200 Units, Intramuscular, Q90 Days, David Cortez MD, 200 Units at 03/13/19 1504    onabotulinumtoxina injection 200 Units, 200  Units, Intramuscular, Q90 Days, David Cortez MD, 200 Units at 05/23/19 1123    onabotulinumtoxina injection 200 Units, 200 Units, Intramuscular, Q90 Days, David Cortez MD, 200 Units at 10/11/19 1112    onabotulinumtoxina injection 200 Units, 200 Units, Intramuscular, Q90 Days, David Cortez MD, 200 Units at 12/19/19 1036    onabotulinumtoxina injection 200 Units, 200 Units, Intramuscular, Q10 weeks, David Cortez MD, 200 Units at 12/27/24 1030    onabotulinumtoxina injection 200 Units, 200 Units, Intramuscular, Q90 Days, David Cortez MD, 200 Units at 01/04/24 1349    Facility-Administered Medications Ordered in Other Visits:     lactated ringers infusion, , Intravenous, Continuous, Humberto Angel MD, Stopped at 02/11/20 0801    lidocaine (PF) 10 mg/ml (1%) injection 5 mg, 0.5 mL, Intradermal, Once, Humberto Angel MD     Review of Systems   Constitutional:  Negative for chills, diaphoresis and fever.   HENT:  Negative for congestion and ear pain.    Eyes:  Negative for blurred vision.   Respiratory:  Negative for cough.    Cardiovascular:  Negative for chest pain.   Gastrointestinal:  Negative for nausea and vomiting.   Musculoskeletal:  Negative for falls.   Neurological:  Negative for dizziness, weakness and headaches.          There were no vitals filed for this visit.   Physical Exam  Gen- NAD, appears stated age  Resp- Breathing comfortably on RA  Neuro- Alert, spontaneous movements  Psych- Calm, cooperative, logical thought process  Physical exam limited by audiovisual encounter.          Diagnoses and health risks identified today and associated recommendations/orders:    1. Encounter for preventive health examination  - Chart reviewed. Problem list updated. Discussed current medical diagnosis, current medications, medical/surgical/family/social history; updated provider list; documented vital signs; identified any cognitive impairment; and updated risk factor list. Addressed any outstanding  health maintenance. Provided patient with personalized health advice. Continue to follow up with PCP and any specialists.      2. Hemiparesis affecting left side as late effect of stroke  Taking statin ,rosuvastatin,losartan ,lasix  Chronic; stable on current treatment plan, follow up with PCP   3. 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  Taking Aricept  Chronic; stable on current treatment plan, follow up with PCP   4. Complete AV block  Chronic; stable on current treatment plan, follow up with PCP   Follow up with cardiology   5. Depression, major, recurrent, moderate  Taking Abilify  Chronic; stable on current treatment plan, follow up with PCP   6. Paroxysmal atrial fibrillation  Taking Coreg  Chronic; stable on current treatment plan, follow up with PCP   7. Nonischemic cardiomyopathy from RV pacing, resolved with upgrade cardiac resynchronization therapy  Taking statin ,rosuvastatin,losartan ,lasix   Chronic; stable on current treatment plan, follow up with PCP   8. Abnormality of gait and mobility  Chronic; stable on current treatment plan, follow up with PCP   9. Aortic atherosclerosis  Taking statin ,rosuvastatin   Chronic; stable on current treatment plan, follow up with PCP   10. Empty sella  Chronic; stable on current treatment plan, follow up with PCP   Follow up with endocrine for further changes   11. Class 3 severe obesity due to excess calories with serious comorbidity and body mass index (BMI) of 50.0 to 59.9 in adult  Recommendation weight loss ,healthy diet, and increasing exercise as tolerated with goal of 150min/week.        Provided Amna with a 5-10 year written screening schedule and personal prevention plan. Recommendations were developed using the USPSTF age appropriate recommendations. Education, counseling, and referrals were provided as needed. After Visit Summary printed and given to patient which includes a list of additional screenings\tests needed.    Follow up in  about 1 year (around 1/16/2026) for amrit Johnston.    MARIO Devi    Advance Care Planning

## 2025-01-16 NOTE — TELEPHONE ENCOUNTER
Called pt; informed pt I was just making a reminder call for pt's virtual visit today at 2:30pm and to see if pt needed any help; pt stated didn't need any help and would complete e-pre check soon; pt informed to login 10 minutes prior to appt time

## 2025-01-16 NOTE — PATIENT INSTRUCTIONS
Counseling and Referral of Other Preventative  (Italic type indicates deductible and co-insurance are waived)    Patient Name: Amna Chawla  Today's Date: 1/16/2025    Health Maintenance       Date Due Completion Date    Influenza Vaccine (1) 09/01/2024 9/20/2023    Cervical Cancer Screening 09/10/2024 9/10/2019    COVID-19 Vaccine (4 - 2024-25 season) 09/12/2025 (Originally 9/1/2024) 1/10/2022    Hemoglobin A1c (Prediabetes) 04/02/2025 4/2/2024    Lipid Panel 04/02/2025 4/2/2024    Mammogram 07/02/2025 7/2/2024    High Dose Statin 01/14/2026 1/14/2025    Colorectal Cancer Screening 06/02/2027 6/2/2022    TETANUS VACCINE 10/02/2028 10/2/2018    RSV Vaccine (Age 60+ and Pregnant patients) (1 - 1-dose 75+ series) 07/05/2041 ---        No orders of the defined types were placed in this encounter.    The following information is provided to all patients.  This information is to help you find resources for any of the problems found today that may be affecting your health:                  Living healthy guide: www.[facility].[place].gov      Understanding Diabetes: www.diabetes.org      Eating healthy: www.cdc.gov/healthyweight      CDC home safety checklist: www.cdc.gov/steadi/patient.html      Agency on Aging: www.goea.[place].[facility]      Alcoholics anonymous (AA): www.aa.org      Physical Activity: www.johnnie.nih.gov/wb6fosw      Tobacco use: www.quitwithusla.org

## 2025-01-17 ENCOUNTER — OFFICE VISIT (OUTPATIENT)
Dept: CARDIOLOGY | Facility: CLINIC | Age: 60
End: 2025-01-17
Payer: MEDICARE

## 2025-01-17 VITALS
OXYGEN SATURATION: 98 % | HEART RATE: 66 BPM | WEIGHT: 291.44 LBS | HEIGHT: 62 IN | SYSTOLIC BLOOD PRESSURE: 121 MMHG | BODY MASS INDEX: 53.63 KG/M2 | DIASTOLIC BLOOD PRESSURE: 57 MMHG

## 2025-01-17 DIAGNOSIS — I48.0 PAROXYSMAL ATRIAL FIBRILLATION: ICD-10-CM

## 2025-01-17 DIAGNOSIS — I42.8 NONISCHEMIC CARDIOMYOPATHY: ICD-10-CM

## 2025-01-17 DIAGNOSIS — R60.0 LEG EDEMA: ICD-10-CM

## 2025-01-17 DIAGNOSIS — E78.2 MIXED HYPERLIPIDEMIA: ICD-10-CM

## 2025-01-17 PROCEDURE — 99999 PR PBB SHADOW E&M-EST. PATIENT-LVL V: CPT | Mod: PBBFAC,GC,, | Performed by: STUDENT IN AN ORGANIZED HEALTH CARE EDUCATION/TRAINING PROGRAM

## 2025-01-17 PROCEDURE — 3078F DIAST BP <80 MM HG: CPT | Mod: CPTII,GC,S$GLB, | Performed by: STUDENT IN AN ORGANIZED HEALTH CARE EDUCATION/TRAINING PROGRAM

## 2025-01-17 PROCEDURE — 99214 OFFICE O/P EST MOD 30 MIN: CPT | Mod: GC,S$GLB,, | Performed by: STUDENT IN AN ORGANIZED HEALTH CARE EDUCATION/TRAINING PROGRAM

## 2025-01-17 PROCEDURE — 3074F SYST BP LT 130 MM HG: CPT | Mod: CPTII,GC,S$GLB, | Performed by: STUDENT IN AN ORGANIZED HEALTH CARE EDUCATION/TRAINING PROGRAM

## 2025-01-17 PROCEDURE — 3008F BODY MASS INDEX DOCD: CPT | Mod: CPTII,GC,S$GLB, | Performed by: STUDENT IN AN ORGANIZED HEALTH CARE EDUCATION/TRAINING PROGRAM

## 2025-01-17 PROCEDURE — 1159F MED LIST DOCD IN RCRD: CPT | Mod: CPTII,GC,S$GLB, | Performed by: STUDENT IN AN ORGANIZED HEALTH CARE EDUCATION/TRAINING PROGRAM

## 2025-01-17 RX ORDER — FUROSEMIDE 20 MG/1
20 TABLET ORAL DAILY PRN
Qty: 45 TABLET | Refills: 3 | Status: SHIPPED | OUTPATIENT
Start: 2025-01-17 | End: 2026-01-17

## 2025-01-17 RX ORDER — ROSUVASTATIN CALCIUM 40 MG/1
40 TABLET, COATED ORAL NIGHTLY
Qty: 90 TABLET | Refills: 3 | Status: SHIPPED | OUTPATIENT
Start: 2025-01-17

## 2025-01-18 NOTE — PROGRESS NOTES
Subjective     Chief Complaint:  Establish care    History of Present Illness:  Ms. Amna Chawla is a 59 y.o. 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  She was actually seen anginal cardiology clinic approximately 2 years ago.  She follows electrophysiology clinic with Silvia Wang NP.  She reports no major changes since she was last seen.  She does not exercise but reports she does chores all day and has no issues with dyspnea on exertion in her house.  She does report that she occasionally develops dyspnea on exertion requiring her to stop when she asked to walk further than a couple blocks.  This is unchanged from prior.  Denies orthopnea, LE edema, worsening MERCHANT, chest discomfort, palpitations, lightheadedness, and syncope.  She does not diet or exercise.      PAST HISTORY:     Past Medical History:   Diagnosis Date    Anticoagulant long-term use     Anxiety     Asthma     Atrial fibrillation     Brain anoxic injury     Cervicalgia 8/28/2014    CHI (closed head injury) 2/19/2013    Convulsion 5/30/2015    Decreased ROM of left shoulder 4/12/2017    Defibrillator activation 2013    Depression     Heart block     History of sudden cardiac arrest 2/2013    PEA arrest with subsequent long-QT    Hx of psychiatric care     Hypertension     Left atrial enlargement 4/11/2018    Pacemaker 2013    Paresthesia 11/1/2013    Prolonged Q-T interval on ECG 2/8/2013    Psychiatric problem     Seizures     Sleep difficulties     Stroke     weakness lt side    Therapy     Thyroid disease     Upper airway resistance syndrome 2/21/2017       Cardiac History   Results for orders placed during the  hospital encounter of 01/14/25    Echo    Interpretation Summary    Left Ventricle: The left ventricle is normal in size. Ventricular mass is normal. Normal wall thickness. Normal wall motion. There is low normal systolic function with a visually estimated ejection fraction of 50 - 55%. Ejection fraction is approximately 53%. There is indeterminate diastolic function.    Right Ventricle: Normal right ventricular cavity size. Wall thickness is normal. Systolic function is normal. Pacemaker lead present in the ventricle.    Right Atrium: Right atrium is moderately dilated. Lead present in the right atrium.    Aortic Valve: There is mild aortic valve sclerosis. There is trivial-mild aortic regurgitation.    Mitral Valve: There is mild regurgitation.    Tricuspid Valve: There is mild regurgitation.    Pulmonary Artery: The estimated pulmonary artery systolic pressure is 35 mmHg.    IVC/SVC: Intermediate venous pressure at 8 mmHg.    EF   Date Value Ref Range Status   01/14/2025 53 % Final   03/22/2023 50 % Final     No results found for this or any previous visit.      MEDICATIONS:     Current Outpatient Medications on File Prior to Visit   Medication Sig    albuterol (VENTOLIN HFA) 90 mcg/actuation inhaler Inhale 2 puffs into the lungs every 6 (six) hours as needed for Wheezing. Rescue    amLODIPine (NORVASC) 5 MG tablet Take 1 tablet (5 mg total) by mouth once daily.    ARIPiprazole (ABILIFY) 15 MG Tab Take 1 tablet (15 mg total) by mouth once daily.    carvediloL (COREG) 25 MG tablet Take 2 tablets (50 mg total) by mouth 2 (two) times daily with meals.    clonazePAM (KLONOPIN) 1 MG tablet TAKE 1 TABLET BY MOUTH DAILY AS NEEDED FOR ANXIETY    cyanocobalamin, vitamin B-12, 1,000 mcg Subl Place 1,000 mcg under the tongue once daily.    donepezil (ARICEPT) 10 MG tablet Take 1 tablet (10 mg total) by mouth 2 (two) times daily.    flurbiprofen (ANSAID) 100 MG tablet Take 1 tablet (100 mg total) by mouth 2 (two) times daily  as needed (migraine).    fluticasone propionate (FLONASE) 50 mcg/actuation nasal spray 1 spray (50 mcg total) by Each Nostril route once daily.    fremanezumab-vfrm (AJOVY) 225 mg/1.5 mL injection Inject 1.5 mLs (225 mg total) into the skin every 28 days.    gabapentin (NEURONTIN) 300 MG capsule Take 1 capsule (300 mg total) by mouth 3 (three) times daily.    losartan (COZAAR) 100 MG tablet Take 1 tablet (100 mg total) by mouth once daily.    magnesium 200 mg Tab Take 200 mg by mouth once daily.    mupirocin (BACTROBAN) 2 % ointment Apply topically 3 (three) times daily.    omeprazole (PRILOSEC) 40 MG capsule Take 1 capsule (40 mg total) by mouth once daily. Take 30 minutes before breakfast    ondansetron (ZOFRAN-ODT) 4 MG TbDL DISSOLVE 1 TABLET(4 MG) ON THE TONGUE EVERY 12 HOURS AS NEEDED FOR NAUSEA    polyethylene glycol (GLYCOLAX) 17 gram PwPk Take 17 g by mouth once daily.    rivaroxaban (XARELTO) 20 mg Tab Take 1 tablet (20 mg total) by mouth daily with dinner or evening meal.    sertraline (ZOLOFT) 100 MG tablet Take 2 tablets (200 mg total) by mouth once daily.    tiaGABine (GABITRIL) 4 MG tablet Take 1 tablet (4 mg total) by mouth every evening.    tiZANidine (ZANAFLEX) 4 MG tablet Take 1 tablet (4 mg total) by mouth 3 (three) times daily as needed (muscle pain).    tiZANidine (ZANAFLEX) 4 MG tablet Take 1 tablet (4 mg total) by mouth 3 (three) times daily as needed (muscle pain).    ubrogepant (UBROGEPANT) 100 mg tablet Take 1 tablet (100 mg total) by mouth 2 (two) times daily as needed for Migraine.    zolpidem (AMBIEN) 10 mg Tab Take 1 tablet (10 mg total) by mouth nightly as needed (insomnia).    [DISCONTINUED] rosuvastatin (CRESTOR) 40 MG Tab Take 1 tablet (40 mg total) by mouth every evening.    cetirizine (ZYRTEC) 10 MG tablet Take 1 tablet (10 mg total) by mouth once daily. for 7 days    EPINEPHrine (EPIPEN) 0.3 mg/0.3 mL AtIn Inject 0.3 mLs (0.3 mg total) into the muscle once. for 1 dose     "[DISCONTINUED] furosemide (LASIX) 20 MG tablet Take 1 tablet (20 mg total) by mouth every other day.    [DISCONTINUED] metFORMIN (GLUCOPHAGE) 500 MG tablet Take 1 tablet (500 mg total) by mouth 2 (two) times daily with meals. (Patient not taking: Reported on 1/14/2025)     Current Facility-Administered Medications on File Prior to Visit   Medication    cyanocobalamin tablet 100 mcg    lactated ringers infusion    lidocaine (PF) 10 mg/ml (1%) injection 5 mg    onabotulinumtoxina injection 200 Units    onabotulinumtoxina injection 200 Units    onabotulinumtoxina injection 200 Units    onabotulinumtoxina injection 200 Units    onabotulinumtoxina injection 200 Units    onabotulinumtoxina injection 200 Units    onabotulinumtoxina injection 200 Units    onabotulinumtoxina injection 200 Units       SUBJECTIVE:     Review of Systems   Constitutional:  Negative for malaise/fatigue.   Cardiovascular:  Negative for chest pain, palpitations, orthopnea, claudication, leg swelling and PND.        OBJECTIVE:     Vital Signs:  Vitals:    01/17/25 1417   BP: (!) 121/57   Patient Position: Sitting   Pulse: 66   SpO2: 98%   Weight: 132.2 kg (291 lb 7.2 oz)   Height: 5' 2" (1.575 m)     Body mass index is 53.31 kg/m².     Physical Exam  Constitutional:       Appearance: She is obese.   HENT:      Head: Normocephalic and atraumatic.   Eyes:      Conjunctiva/sclera: Conjunctivae normal.   Neck:      Comments: No JVD at 30°  Cardiovascular:      Rate and Rhythm: Normal rate and regular rhythm.      Heart sounds: Normal heart sounds.   Pulmonary:      Effort: Pulmonary effort is normal. No respiratory distress.      Breath sounds: Normal breath sounds. No wheezing.   Abdominal:      General: There is no distension.      Palpations: Abdomen is soft.      Tenderness: There is no abdominal tenderness.   Musculoskeletal:      Cervical back: Normal range of motion.      Right lower leg: No edema.      Left lower leg: No edema.   Neurological: " "     Mental Status: She is alert and oriented to person, place, and time.   Psychiatric:         Mood and Affect: Affect normal.         Laboratory  Lab Results   Component Value Date    WBC 5.95 11/07/2024    HGB 11.1 (L) 11/07/2024    HCT 33.3 (L) 11/07/2024    MCV 93 11/07/2024     11/07/2024     BMP  Lab Results   Component Value Date     (L) 11/07/2024    K 3.7 11/07/2024     11/07/2024    CO2 23 11/07/2024    BUN 13 11/07/2024    CREATININE 0.8 11/07/2024    CALCIUM 8.7 11/07/2024    ANIONGAP 6 (L) 11/07/2024    EGFRNORACEVR >60.0 11/07/2024     Lab Results   Component Value Date    INR 1.9 (H) 05/17/2022    INR 1.3 (H) 05/17/2022    INR 1.0 11/12/2020     Lab Results   Component Value Date    HGBA1C 6.3 (H) 04/02/2024     No results for input(s): "POCTGLUCOSE" in the last 72 hours.    Diagnostic Results:  Labs: Reviewed  Echo: Reviewed    ASSESSMENT & PLAN:     Nonischemic cardiomyopathy  She is euvolemic on exam.  For dyspnea on exertion is unchanged from prior and more likely to be related to obesity and deconditioning  Pacing induced with normal PET in 2022.  Her ejection fraction has been recovered since upgrade to CRT   She has had no clinical heart failure since she recovered her ejection fraction years ago.  Do not feel aggressive management of further GDM T titration is necessary at this time unless she develops evidence of diastolic heart failure  Continue home losartan and Coreg    Hypertension  Currently well-controlled  Enrolled in digital hypertension program   Continue Losartan, Coreg and Amlodipine      PAF  Continue Carvedilol, Rivaroxaban  Managed by MICHAEL ESCOBAR  Lab Results   Component Value Date    LDLCALC 108.4 04/02/2024     She has been off her statin for unclear reasons  She has history of CVA and also an elevated ASCVD score.  Wants to restart Crestor       Morbid obesity  Encouraged diet and exercise     History of CVA  Continue aspirin, restart statin   "     Discussed with Dr. Gramajo  - staff attestation to follow      Balaji , MD  Cardiovascular Disease Fellow PGY-6

## 2025-01-27 LAB
OHS CV AF BURDEN PERCENT: < 1
OHS CV BIV PACING PERCENT: 93 %
OHS CV BIV PACING PERCENT: 93 %
OHS CV DC REMOTE DEVICE TYPE: NORMAL
OHS CV RV PACING PERCENT: 90 %

## 2025-01-29 ENCOUNTER — HOSPITAL ENCOUNTER (INPATIENT)
Facility: HOSPITAL | Age: 60
LOS: 3 days | Discharge: HOME | DRG: 392 | End: 2025-02-02
Attending: EMERGENCY MEDICINE | Admitting: INTERNAL MEDICINE
Payer: MEDICARE

## 2025-01-29 DIAGNOSIS — R77.8 ELEVATED TOTAL PROTEIN: ICD-10-CM

## 2025-01-29 DIAGNOSIS — G43.711 CHRONIC MIGRAINE WITHOUT AURA, WITH INTRACTABLE MIGRAINE, SO STATED, WITH STATUS MIGRAINOSUS: ICD-10-CM

## 2025-01-29 DIAGNOSIS — R10.12 LUQ ABDOMINAL PAIN: ICD-10-CM

## 2025-01-29 DIAGNOSIS — R09.02 HYPOXIA: ICD-10-CM

## 2025-01-29 DIAGNOSIS — Z95.0 HISTORY OF PACEMAKER: ICD-10-CM

## 2025-01-29 DIAGNOSIS — Z13.6 SCREENING FOR CARDIOVASCULAR CONDITION: ICD-10-CM

## 2025-01-29 DIAGNOSIS — R50.9 FEVER, UNSPECIFIED FEVER CAUSE: Primary | ICD-10-CM

## 2025-01-29 DIAGNOSIS — E53.8 B12 DEFICIENCY: ICD-10-CM

## 2025-01-29 DIAGNOSIS — R07.9 CHEST PAIN: ICD-10-CM

## 2025-01-29 PROBLEM — R10.30 LOWER ABDOMINAL PAIN: Status: ACTIVE | Noted: 2025-01-29

## 2025-01-29 PROBLEM — K52.9 ENTERITIS: Status: ACTIVE | Noted: 2025-01-29

## 2025-01-29 LAB
ALBUMIN SERPL BCP-MCNC: 2.9 G/DL (ref 3.5–5.2)
ALP SERPL-CCNC: 66 U/L (ref 40–150)
ALT SERPL W/O P-5'-P-CCNC: 15 U/L (ref 10–44)
ANION GAP SERPL CALC-SCNC: 6 MMOL/L (ref 8–16)
AST SERPL-CCNC: 14 U/L (ref 10–40)
BACTERIA #/AREA URNS AUTO: ABNORMAL /HPF
BASOPHILS # BLD AUTO: 0 K/UL (ref 0–0.2)
BASOPHILS NFR BLD: 0 % (ref 0–1.9)
BILIRUB SERPL-MCNC: 0.7 MG/DL (ref 0.1–1)
BILIRUB UR QL STRIP: NEGATIVE
BIPAP: 0
BUN SERPL-MCNC: 12 MG/DL (ref 6–20)
CALCIUM SERPL-MCNC: 8.6 MG/DL (ref 8.7–10.5)
CHLORIDE SERPL-SCNC: 105 MMOL/L (ref 95–110)
CLARITY UR REFRACT.AUTO: CLEAR
CO2 SERPL-SCNC: 23 MMOL/L (ref 23–29)
COLOR UR AUTO: YELLOW
CREAT SERPL-MCNC: 0.8 MG/DL (ref 0.5–1.4)
DIFFERENTIAL METHOD BLD: ABNORMAL
EOSINOPHIL # BLD AUTO: 0 K/UL (ref 0–0.5)
EOSINOPHIL NFR BLD: 0 % (ref 0–8)
ERYTHROCYTE [DISTWIDTH] IN BLOOD BY AUTOMATED COUNT: 16.2 % (ref 11.5–14.5)
EST. GFR  (NO RACE VARIABLE): >60 ML/MIN/1.73 M^2
FIO2: 21 %
GLUCOSE SERPL-MCNC: 111 MG/DL (ref 70–110)
GLUCOSE UR QL STRIP: NEGATIVE
HCT VFR BLD AUTO: 33.6 % (ref 37–48.5)
HGB BLD-MCNC: 11.3 G/DL (ref 12–16)
HGB UR QL STRIP: NEGATIVE
HYALINE CASTS UR QL AUTO: 0 /LPF
IMM GRANULOCYTES # BLD AUTO: 0.03 K/UL (ref 0–0.04)
IMM GRANULOCYTES NFR BLD AUTO: 0.4 % (ref 0–0.5)
INFLUENZA A, MOLECULAR: NEGATIVE
INFLUENZA B, MOLECULAR: NEGATIVE
KETONES UR QL STRIP: NEGATIVE
LDH SERPL L TO P-CCNC: 1.3 MMOL/L
LEUKOCYTE ESTERASE UR QL STRIP: NEGATIVE
LIPASE SERPL-CCNC: 11 U/L (ref 4–60)
LYMPHOCYTES # BLD AUTO: 0.6 K/UL (ref 1–4.8)
LYMPHOCYTES NFR BLD: 8.5 % (ref 18–48)
MCH RBC QN AUTO: 31 PG (ref 27–31)
MCHC RBC AUTO-ENTMCNC: 33.6 G/DL (ref 32–36)
MCV RBC AUTO: 92 FL (ref 82–98)
MICROSCOPIC COMMENT: ABNORMAL
MONOCYTES # BLD AUTO: 0.4 K/UL (ref 0.3–1)
MONOCYTES NFR BLD: 5.5 % (ref 4–15)
NEUTROPHILS # BLD AUTO: 6 K/UL (ref 1.8–7.7)
NEUTROPHILS NFR BLD: 85.6 % (ref 38–73)
NITRITE UR QL STRIP: NEGATIVE
NRBC BLD-RTO: 0 /100 WBC
OHS QRS DURATION: 138 MS
OHS QTC CALCULATION: 533 MS
PH UR STRIP: 6 [PH] (ref 5–8)
PLATELET # BLD AUTO: 189 K/UL (ref 150–450)
PMV BLD AUTO: 12.4 FL (ref 9.2–12.9)
POC PERFORMED BY: NORMAL
POC TEMPERATURE: 37 C
POTASSIUM SERPL-SCNC: 3.4 MMOL/L (ref 3.5–5.1)
PROT SERPL-MCNC: 10.8 G/DL (ref 6–8.4)
PROT UR QL STRIP: ABNORMAL
RBC # BLD AUTO: 3.64 M/UL (ref 4–5.4)
RBC #/AREA URNS AUTO: 4 /HPF (ref 0–4)
SARS-COV-2 RDRP RESP QL NAA+PROBE: NEGATIVE
SODIUM SERPL-SCNC: 134 MMOL/L (ref 136–145)
SP GR UR STRIP: >1.03 (ref 1–1.03)
SPECIMEN SOURCE: NORMAL
SPECIMEN SOURCE: NORMAL
SQUAMOUS #/AREA URNS AUTO: 1 /HPF
URN SPEC COLLECT METH UR: ABNORMAL
WBC # BLD AUTO: 6.96 K/UL (ref 3.9–12.7)
WBC #/AREA URNS AUTO: 7 /HPF (ref 0–5)

## 2025-01-29 PROCEDURE — 25500020 PHARM REV CODE 255: Performed by: EMERGENCY MEDICINE

## 2025-01-29 PROCEDURE — 87502 INFLUENZA DNA AMP PROBE: CPT | Performed by: EMERGENCY MEDICINE

## 2025-01-29 PROCEDURE — G0378 HOSPITAL OBSERVATION PER HR: HCPCS

## 2025-01-29 PROCEDURE — 83690 ASSAY OF LIPASE: CPT | Performed by: EMERGENCY MEDICINE

## 2025-01-29 PROCEDURE — 96376 TX/PRO/DX INJ SAME DRUG ADON: CPT

## 2025-01-29 PROCEDURE — 93005 ELECTROCARDIOGRAM TRACING: CPT

## 2025-01-29 PROCEDURE — 96365 THER/PROPH/DIAG IV INF INIT: CPT

## 2025-01-29 PROCEDURE — 81001 URINALYSIS AUTO W/SCOPE: CPT | Performed by: EMERGENCY MEDICINE

## 2025-01-29 PROCEDURE — 63600175 PHARM REV CODE 636 W HCPCS: Performed by: PHYSICIAN ASSISTANT

## 2025-01-29 PROCEDURE — A4216 STERILE WATER/SALINE, 10 ML: HCPCS | Performed by: PHYSICIAN ASSISTANT

## 2025-01-29 PROCEDURE — 83605 ASSAY OF LACTIC ACID: CPT

## 2025-01-29 PROCEDURE — 63600175 PHARM REV CODE 636 W HCPCS: Mod: TB,JZ | Performed by: EMERGENCY MEDICINE

## 2025-01-29 PROCEDURE — 80053 COMPREHEN METABOLIC PANEL: CPT | Performed by: EMERGENCY MEDICINE

## 2025-01-29 PROCEDURE — 99285 EMERGENCY DEPT VISIT HI MDM: CPT | Mod: 25

## 2025-01-29 PROCEDURE — 85025 COMPLETE CBC W/AUTO DIFF WBC: CPT | Performed by: EMERGENCY MEDICINE

## 2025-01-29 PROCEDURE — 87635 SARS-COV-2 COVID-19 AMP PRB: CPT | Performed by: EMERGENCY MEDICINE

## 2025-01-29 PROCEDURE — 63600175 PHARM REV CODE 636 W HCPCS: Performed by: EMERGENCY MEDICINE

## 2025-01-29 PROCEDURE — 25000003 PHARM REV CODE 250: Performed by: EMERGENCY MEDICINE

## 2025-01-29 PROCEDURE — 99900035 HC TECH TIME PER 15 MIN (STAT)

## 2025-01-29 PROCEDURE — 96366 THER/PROPH/DIAG IV INF ADDON: CPT

## 2025-01-29 PROCEDURE — 87040 BLOOD CULTURE FOR BACTERIA: CPT | Performed by: EMERGENCY MEDICINE

## 2025-01-29 PROCEDURE — 25000003 PHARM REV CODE 250: Performed by: PHYSICIAN ASSISTANT

## 2025-01-29 PROCEDURE — 96361 HYDRATE IV INFUSION ADD-ON: CPT

## 2025-01-29 PROCEDURE — 96375 TX/PRO/DX INJ NEW DRUG ADDON: CPT

## 2025-01-29 PROCEDURE — 93010 ELECTROCARDIOGRAM REPORT: CPT | Mod: ,,, | Performed by: INTERNAL MEDICINE

## 2025-01-29 RX ORDER — POLYETHYLENE GLYCOL 3350 17 G/17G
17 POWDER, FOR SOLUTION ORAL DAILY PRN
Status: DISCONTINUED | OUTPATIENT
Start: 2025-01-29 | End: 2025-02-02 | Stop reason: HOSPADM

## 2025-01-29 RX ORDER — SODIUM CHLORIDE 0.9 % (FLUSH) 0.9 %
10 SYRINGE (ML) INJECTION EVERY 8 HOURS
Status: DISCONTINUED | OUTPATIENT
Start: 2025-01-29 | End: 2025-02-02 | Stop reason: HOSPADM

## 2025-01-29 RX ORDER — SERTRALINE HYDROCHLORIDE 50 MG/1
200 TABLET, FILM COATED ORAL DAILY
Status: DISCONTINUED | OUTPATIENT
Start: 2025-01-30 | End: 2025-01-31

## 2025-01-29 RX ORDER — DONEPEZIL HYDROCHLORIDE 10 MG/1
10 TABLET, FILM COATED ORAL 2 TIMES DAILY
Status: DISCONTINUED | OUTPATIENT
Start: 2025-01-29 | End: 2025-01-31

## 2025-01-29 RX ORDER — ATORVASTATIN CALCIUM 40 MG/1
80 TABLET, FILM COATED ORAL NIGHTLY
Status: DISCONTINUED | OUTPATIENT
Start: 2025-01-29 | End: 2025-02-02 | Stop reason: HOSPADM

## 2025-01-29 RX ORDER — GLUCAGON 1 MG
1 KIT INJECTION
Status: DISCONTINUED | OUTPATIENT
Start: 2025-01-29 | End: 2025-02-02 | Stop reason: HOSPADM

## 2025-01-29 RX ORDER — ONDANSETRON 8 MG/1
8 TABLET, ORALLY DISINTEGRATING ORAL EVERY 8 HOURS PRN
Status: DISCONTINUED | OUTPATIENT
Start: 2025-01-29 | End: 2025-01-29

## 2025-01-29 RX ORDER — HYDROMORPHONE HYDROCHLORIDE 1 MG/ML
0.5 INJECTION, SOLUTION INTRAMUSCULAR; INTRAVENOUS; SUBCUTANEOUS
Status: COMPLETED | OUTPATIENT
Start: 2025-01-29 | End: 2025-01-29

## 2025-01-29 RX ORDER — IBUPROFEN 200 MG
24 TABLET ORAL
Status: DISCONTINUED | OUTPATIENT
Start: 2025-01-29 | End: 2025-02-02 | Stop reason: HOSPADM

## 2025-01-29 RX ORDER — METRONIDAZOLE 500 MG/100ML
500 INJECTION, SOLUTION INTRAVENOUS
Status: COMPLETED | OUTPATIENT
Start: 2025-01-29 | End: 2025-01-29

## 2025-01-29 RX ORDER — PROCHLORPERAZINE EDISYLATE 5 MG/ML
5 INJECTION INTRAMUSCULAR; INTRAVENOUS EVERY 6 HOURS PRN
Status: DISCONTINUED | OUTPATIENT
Start: 2025-01-29 | End: 2025-02-02 | Stop reason: HOSPADM

## 2025-01-29 RX ORDER — METRONIDAZOLE 500 MG/100ML
500 INJECTION, SOLUTION INTRAVENOUS
Status: DISCONTINUED | OUTPATIENT
Start: 2025-01-29 | End: 2025-02-02

## 2025-01-29 RX ORDER — ACETAMINOPHEN 500 MG
1000 TABLET ORAL
Status: COMPLETED | OUTPATIENT
Start: 2025-01-29 | End: 2025-01-29

## 2025-01-29 RX ORDER — TALC
6 POWDER (GRAM) TOPICAL NIGHTLY PRN
Status: DISCONTINUED | OUTPATIENT
Start: 2025-01-29 | End: 2025-01-29

## 2025-01-29 RX ORDER — ACETAMINOPHEN 500 MG
1000 TABLET ORAL EVERY 8 HOURS
Status: DISCONTINUED | OUTPATIENT
Start: 2025-01-29 | End: 2025-01-30

## 2025-01-29 RX ORDER — CEFTRIAXONE 2 G/1
2 INJECTION, POWDER, FOR SOLUTION INTRAMUSCULAR; INTRAVENOUS
Status: COMPLETED | OUTPATIENT
Start: 2025-01-29 | End: 2025-01-29

## 2025-01-29 RX ORDER — AMLODIPINE BESYLATE 5 MG/1
5 TABLET ORAL DAILY
Status: DISCONTINUED | OUTPATIENT
Start: 2025-01-30 | End: 2025-01-30

## 2025-01-29 RX ORDER — IPRATROPIUM BROMIDE AND ALBUTEROL SULFATE 2.5; .5 MG/3ML; MG/3ML
3 SOLUTION RESPIRATORY (INHALATION) EVERY 6 HOURS PRN
Status: DISCONTINUED | OUTPATIENT
Start: 2025-01-29 | End: 2025-02-02 | Stop reason: HOSPADM

## 2025-01-29 RX ORDER — CARVEDILOL 12.5 MG/1
50 TABLET ORAL 2 TIMES DAILY WITH MEALS
Status: DISCONTINUED | OUTPATIENT
Start: 2025-01-29 | End: 2025-01-30

## 2025-01-29 RX ORDER — IBUPROFEN 200 MG
16 TABLET ORAL
Status: DISCONTINUED | OUTPATIENT
Start: 2025-01-29 | End: 2025-02-02 | Stop reason: HOSPADM

## 2025-01-29 RX ORDER — ZOLPIDEM TARTRATE 5 MG/1
10 TABLET ORAL NIGHTLY PRN
Status: DISCONTINUED | OUTPATIENT
Start: 2025-01-29 | End: 2025-01-31

## 2025-01-29 RX ORDER — NALOXONE HCL 0.4 MG/ML
0.02 VIAL (ML) INJECTION
Status: DISCONTINUED | OUTPATIENT
Start: 2025-01-29 | End: 2025-02-02 | Stop reason: HOSPADM

## 2025-01-29 RX ORDER — CEFTRIAXONE 1 G/1
1 INJECTION, POWDER, FOR SOLUTION INTRAMUSCULAR; INTRAVENOUS
Status: DISCONTINUED | OUTPATIENT
Start: 2025-01-29 | End: 2025-01-31

## 2025-01-29 RX ORDER — SIMETHICONE 80 MG
1 TABLET,CHEWABLE ORAL 4 TIMES DAILY PRN
Status: DISCONTINUED | OUTPATIENT
Start: 2025-01-29 | End: 2025-02-02 | Stop reason: HOSPADM

## 2025-01-29 RX ORDER — PANTOPRAZOLE SODIUM 40 MG/1
40 TABLET, DELAYED RELEASE ORAL DAILY
Status: DISCONTINUED | OUTPATIENT
Start: 2025-01-30 | End: 2025-01-30

## 2025-01-29 RX ORDER — ALUMINUM HYDROXIDE, MAGNESIUM HYDROXIDE, AND SIMETHICONE 1200; 120; 1200 MG/30ML; MG/30ML; MG/30ML
30 SUSPENSION ORAL 4 TIMES DAILY PRN
Status: DISCONTINUED | OUTPATIENT
Start: 2025-01-29 | End: 2025-01-30

## 2025-01-29 RX ORDER — GABAPENTIN 300 MG/1
300 CAPSULE ORAL 3 TIMES DAILY
Status: DISCONTINUED | OUTPATIENT
Start: 2025-01-29 | End: 2025-01-31

## 2025-01-29 RX ORDER — LOSARTAN POTASSIUM 50 MG/1
100 TABLET ORAL DAILY
Status: DISCONTINUED | OUTPATIENT
Start: 2025-01-30 | End: 2025-01-30

## 2025-01-29 RX ORDER — KETOROLAC TROMETHAMINE 30 MG/ML
15 INJECTION, SOLUTION INTRAMUSCULAR; INTRAVENOUS EVERY 6 HOURS PRN
Status: DISCONTINUED | OUTPATIENT
Start: 2025-01-29 | End: 2025-01-30

## 2025-01-29 RX ORDER — CYANOCOBALAMIN (VITAMIN B-12) 250 MCG
1000 TABLET ORAL DAILY
Status: DISCONTINUED | OUTPATIENT
Start: 2025-01-30 | End: 2025-02-02 | Stop reason: HOSPADM

## 2025-01-29 RX ORDER — ACETAMINOPHEN 325 MG/1
650 TABLET ORAL EVERY 4 HOURS PRN
Status: DISCONTINUED | OUTPATIENT
Start: 2025-01-29 | End: 2025-01-29

## 2025-01-29 RX ADMIN — CEFTRIAXONE 2 G: 2 INJECTION, POWDER, FOR SOLUTION INTRAMUSCULAR; INTRAVENOUS at 01:01

## 2025-01-29 RX ADMIN — HYDROMORPHONE HYDROCHLORIDE 0.5 MG: 1 INJECTION, SOLUTION INTRAMUSCULAR; INTRAVENOUS; SUBCUTANEOUS at 04:01

## 2025-01-29 RX ADMIN — SODIUM CHLORIDE, PRESERVATIVE FREE 10 ML: 5 INJECTION INTRAVENOUS at 10:01

## 2025-01-29 RX ADMIN — ACETAMINOPHEN 1000 MG: 500 TABLET ORAL at 01:01

## 2025-01-29 RX ADMIN — CEFTRIAXONE 1 G: 1 INJECTION, POWDER, FOR SOLUTION INTRAMUSCULAR; INTRAVENOUS at 10:01

## 2025-01-29 RX ADMIN — METRONIDAZOLE 500 MG: 500 INJECTION, SOLUTION INTRAVENOUS at 10:01

## 2025-01-29 RX ADMIN — KETOROLAC TROMETHAMINE 15 MG: 30 INJECTION, SOLUTION INTRAMUSCULAR; INTRAVENOUS at 10:01

## 2025-01-29 RX ADMIN — DONEPEZIL HYDROCHLORIDE 10 MG: 5 TABLET, FILM COATED ORAL at 09:01

## 2025-01-29 RX ADMIN — METRONIDAZOLE 500 MG: 500 INJECTION, SOLUTION INTRAVENOUS at 02:01

## 2025-01-29 RX ADMIN — CARVEDILOL 50 MG: 12.5 TABLET, FILM COATED ORAL at 09:01

## 2025-01-29 RX ADMIN — SODIUM CHLORIDE 1000 ML: 9 INJECTION, SOLUTION INTRAVENOUS at 04:01

## 2025-01-29 RX ADMIN — IOHEXOL 100 ML: 350 INJECTION, SOLUTION INTRAVENOUS at 03:01

## 2025-01-29 RX ADMIN — HYDROMORPHONE HYDROCHLORIDE 0.5 MG: 1 INJECTION, SOLUTION INTRAMUSCULAR; INTRAVENOUS; SUBCUTANEOUS at 01:01

## 2025-01-29 RX ADMIN — ATORVASTATIN CALCIUM 80 MG: 40 TABLET, FILM COATED ORAL at 09:01

## 2025-01-29 RX ADMIN — ACETAMINOPHEN 1000 MG: 500 TABLET ORAL at 09:01

## 2025-01-29 RX ADMIN — SODIUM CHLORIDE 1000 ML: 0.9 INJECTION, SOLUTION INTRAVENOUS at 07:01

## 2025-01-29 RX ADMIN — GABAPENTIN 300 MG: 300 CAPSULE ORAL at 09:01

## 2025-01-29 NOTE — ED PROVIDER NOTES
"Encounter Date: 1/29/2025       History     Chief Complaint   Patient presents with    Flank Pain     Pt reports left flank pain,  "I don't know if my pacemaker came loose"; +vomiting since this am     59-year-old female, history of AFib on anticoagulation, cardiac arrest, pacemaker, hypertension, seizures, complaining of left upper quadrant abdominal pain and flank pain.  Patient reports that symptoms started this morning, pain is severe and associated with nausea and vomiting but no diarrhea.  She denies any URI symptoms or urinary symptoms.  She took ibuprofen prior to arrival.    The history is provided by the patient.     Review of patient's allergies indicates:   Allergen Reactions    Aspirin Hives    Imitrex [sumatriptan] Palpitations    Penicillins Hives and Swelling    Shellfish containing products Anaphylaxis     seafood    Reglan [metoclopramide hcl] Other (See Comments)     Parkinsonism      Past Medical History:   Diagnosis Date    Anticoagulant long-term use     Anxiety     Asthma     Atrial fibrillation     Brain anoxic injury     Cervicalgia 8/28/2014    [facility] (closed head injury) 2/19/2013    Convulsion 5/30/2015    Decreased ROM of left shoulder 4/12/2017    Defibrillator activation 2013    Depression     Heart block     History of sudden cardiac arrest 2/2013    PEA arrest with subsequent long-QT    Hx of psychiatric care     Hypertension     Left atrial enlargement 4/11/2018    Pacemaker 2013    Paresthesia 11/1/2013    Prolonged Q-T interval on ECG 2/8/2013    Psychiatric problem     Seizures     Sleep difficulties     Stroke     weakness lt side    Therapy     Thyroid disease     Upper airway resistance syndrome 2/21/2017     Past Surgical History:   Procedure Laterality Date    breast reduction      CARDIAC DEFIBRILLATOR PLACEMENT      CARDIAC DEFIBRILLATOR PLACEMENT      CARPAL TUNNEL RELEASE Right     COLONOSCOPY N/A 5/7/2019    Procedure: COLONOSCOPY;  Surgeon: Juan Coffey MD;  Location: " [facility] (2ND FLR);  Service: Endoscopy;  Laterality: N/A;  per DR. Bustamante Pt to have balloon with DR. Coffey due to excesive looping, could not reach cecum - see telephone encounter 3/8/19 and last procedure report dated 6/24/14/ Per Piedad schedule as 90 min case- ERW  pacemaker/AICD Boitronik/   per , ok to hold Xareltox 2 days    COLONOSCOPY N/A 6/2/2022    Procedure: COLONOSCOPY;  Surgeon: Juan Coffey MD;  Location: [facility] (2ND FLR);  Service: Endoscopy;  Laterality: N/A;  combined orders    ESOPHAGOGASTRODUODENOSCOPY N/A 6/2/2022    Procedure: EGD (ESOPHAGOGASTRODUODENOSCOPY);  Surgeon: Juan Coffey MD;  Location: [facility] (2ND FLR);  Service: Endoscopy;  Laterality: N/A;  BMI 54; pt requests 1st available [facility]  Fully vaccinated, Hold Xarelto x 2 days per Dr. Mejia and Plavix x 5 days per Dr. Grossman see telephone encounter 4/25/22, Biotronik PM/AICD, prep instr portal and mailed -ml    HERNIA REPAIR      HIATAL HERNIA REPAIR      INSERT / REPLACE / REMOVE PACEMAKER  10/2017    CRT-D upgrade    REVISION OF IMPLANTABLE CARDIOVERTER-DEFIBRILLATOR (ICD) ELECTRODE LEAD PLACEMENT Left 12/7/2020    Procedure: REVISION, INSERTION, ELECTRODE LEAD, ICD;  Surgeon: Avelino Mejia MD;  Location: [facility] EP LAB;  Service: Cardiology;  Laterality: Left;    REVISION OF SKIN POCKET FOR CARDIOVERTER-DEFIBRILLATOR  12/7/2020    Procedure: Revision, Skin Pocket, For Cardioverter-Defibrillator;  Surgeon: Avelino Mejia MD;  Location: [facility] EP LAB;  Service: Cardiology;;    TOTAL REDUCTION MAMMOPLASTY      TRANSESOPHAGEAL ECHOCARDIOGRAPHY N/A 12/7/2020    Procedure: ECHOCARDIOGRAM, TRANSESOPHAGEAL;  Surgeon: Ivory Goodman MD;  Location: [facility] EP LAB;  Service: Cardiology;  Laterality: N/A;    TRANSESOPHAGEAL ECHOCARDIOGRAPHY N/A 12/7/2020    Procedure: ECHOCARDIOGRAM, TRANSESOPHAGEAL;  Surgeon: [facility] Provider;  Location: [facility] OR 2ND FLR;  Service: Cardiology;  Laterality: N/A;    TRIGGER FINGER RELEASE  Left 8/2/2019    Procedure: RELEASE, TRIGGER FINGER left middle;  Surgeon: Matt Pugh Jr., MD;  Location: [facility] OR;  Service: Plastics;  Laterality: Left;    TRIGGER FINGER RELEASE Right 2/11/2020    Procedure: RELEASE, TRIGGER FINGER middle;  Surgeon: Matt Pugh Jr., MD;  Location: [facility] OR;  Service: Plastics;  Laterality: Right;    TUBAL LIGATION       Family History   Problem Relation Name Age of Onset    COPD Mother      Hypertension Mother      Hypertension Father      Asthma Daughter Donna     Asthma Daughter Lauryn     Glaucoma Maternal Grandmother      Glaucoma Paternal Grandmother      COPD Other      Heart failure Other       Social History     Tobacco Use    Smoking status: Never     Passive exposure: Never    Smokeless tobacco: Never   Substance Use Topics    Alcohol use: Not Currently    Drug use: No     Review of Systems    Physical Exam     Initial Vitals [01/29/25 1203]   BP Pulse Resp Temp SpO2   (!) 146/81 89 18 (!) 102.9 °F (39.4 °C) 95 %      MAP       --         Physical Exam    Nursing note and vitals reviewed.  Constitutional: She appears well-developed and well-nourished. She is not diaphoretic. No distress.   HENT: Mouth/Throat: Oropharynx is clear and moist.   Eyes: Conjunctivae are normal.   Neck: Neck supple.   Cardiovascular:  Normal rate.           Pulmonary/Chest: No respiratory distress.   Abdominal: Abdomen is soft. She exhibits no distension. There is abdominal tenderness. There is no rebound and no guarding.   Musculoskeletal:         General: No edema.      Cervical back: Neck supple.     Neurological: She is alert and oriented to person, place, and time. She has normal strength.   Skin: Skin is warm and dry. No rash noted.   Psychiatric: She has a normal mood and affect. Thought content normal.         ED Course   Procedures  Labs Reviewed   CBC W/ AUTO DIFFERENTIAL - Abnormal       Result Value    WBC 6.96      RBC 3.64 (*)     Hemoglobin 11.3 (*)     Hematocrit  33.6 (*)     MCV 92      MCH 31.0      MCHC 33.6      RDW 16.2 (*)     Platelets 189      MPV 12.4      Immature Granulocytes 0.4      Gran # (ANC) 6.0      Immature Grans (Abs) 0.03      Lymph # 0.6 (*)     Mono # 0.4      Eos # 0.0      Baso # 0.00      nRBC 0      Gran % 85.6 (*)     Lymph % 8.5 (*)     Mono % 5.5      Eosinophil % 0.0      Basophil % 0.0      Differential Method Automated     COMPREHENSIVE METABOLIC PANEL - Abnormal    Sodium 134 (*)     Potassium 3.4 (*)     Chloride 105      CO2 23      Glucose 111 (*)     BUN 12      Creatinine 0.8      Calcium 8.6 (*)     Total Protein 10.8 (*)     Albumin 2.9 (*)     Total Bilirubin 0.7      Alkaline Phosphatase 66      AST 14      ALT 15      eGFR >60.0      Anion Gap 6 (*)    URINALYSIS, REFLEX TO URINE CULTURE - Abnormal    Specimen UA Urine, Catheterized      Color, UA Yellow      Appearance, UA Clear      pH, UA 6.0      Specific Gravity, UA >1.030 (*)     Protein, UA 1+ (*)     Glucose, UA Negative      Ketones, UA Negative      Bilirubin (UA) Negative      Occult Blood UA Negative      Nitrite, UA Negative      Leukocytes, UA Negative      Narrative:     Specimen Source->Urine   URINALYSIS MICROSCOPIC - Abnormal    RBC, UA 4      WBC, UA 7 (*)     Bacteria Rare      Squam Epithel, UA 1      Hyaline Casts, UA 0      Microscopic Comment SEE COMMENT      Narrative:     Specimen Source->Urine   BASIC METABOLIC PANEL - Abnormal    Sodium 135 (*)     Potassium 3.4 (*)     Chloride 110      CO2 18 (*)     Glucose 101      BUN 20      Creatinine 0.9      Calcium 7.7 (*)     Anion Gap 7 (*)     eGFR >60.0     MAGNESIUM - Abnormal    Magnesium 1.5 (*)    CBC W/ AUTO DIFFERENTIAL - Abnormal    WBC 8.88      RBC 2.87 (*)     Hemoglobin 9.0 (*)     Hematocrit 26.6 (*)     MCV 93      MCH 31.4 (*)     MCHC 33.8      RDW 16.4 (*)     Platelets 141 (*)     MPV 12.4      Immature Granulocytes 0.5      Gran # (ANC) 7.0      Immature Grans (Abs) 0.04      Lymph # 1.2       Mono # 0.7      Eos # 0.0      Baso # 0.01      nRBC 0      Gran % 79.0 (*)     Lymph % 13.1 (*)     Mono % 7.3      Eosinophil % 0.0      Basophil % 0.1      Differential Method Automated     CULTURE, BLOOD    Blood Culture, Routine No Growth to date      Narrative:     Aerobic and anaerobic   CULTURE, BLOOD    Blood Culture, Routine No Growth to date      Narrative:     Aerobic and anaerobic   INFLUENZA A & B BY MOLECULAR    Influenza A, Molecular Negative      Influenza B, Molecular Negative      Flu A & B Source NP     SARS-COV-2 RNA AMPLIFICATION, QUAL    SARS-CoV-2 RNA, Amplification, Qual Negative     LIPASE    Lipase 11     B-TYPE NATRIURETIC PEPTIDE   TROPONIN I HIGH SENSITIVITY    Troponin I High Sensitivity 6          ECG Results              EKG 12-lead (Final result)        Collection Time Result Time QRS Duration [facility] QTC Calculation    01/29/25 12:09:43 01/29/25 13:32:56 138 533                     Final result by Interface, Lab In [facility] (01/29/25 13:33:04)                   Narrative:    Test Reason : Z13.6,    Vent. Rate :  98 BPM     Atrial Rate :  98 BPM     P-R Int :    ms          QRS Dur : 138 ms      QT Int : 418 ms       P-R-T Axes :  47  85 -77 degrees    QTcB Int : 533 ms    Ventricular-paced rhythm with Premature atrial complexes with Aberrant  conduction  Biventricular pacemaker detected  Abnormal ECG  No previous ECGs available  Confirmed by Christel Loyola ([id number]) on 1/29/2025 1:32:51 PM    Referred By:            Confirmed By: Christel Loyola                                  Imaging Results               CT Abdomen Pelvis With IV Contrast NO Oral Contrast (Final result)  Result time 01/29/25 15:26:35      Final result by James Rosales MD (01/29/25 15:26:35)                   Impression:      This report was flagged in Epic as abnormal.    1. Findings suggesting infectious or inflammatory enteritis involving the proximal small bowel.  Correlation and follow-up is advised, no  high-grade obstruction.  2. There is hepatomegaly noting possible steatosis, correlation with LFTs recommended.  3. Cholelithiasis, no secondary findings to suggest acute cholecystitis.  4. Moderate hiatal hernia.  5. Please see above for additional findings.      Electronically signed by: James Rosales MD  Date:    01/29/2025  Time:    15:26               Narrative:    EXAMINATION:  CT ABDOMEN PELVIS WITH IV CONTRAST    CLINICAL HISTORY:  Abdominal pain, acute, nonlocalized;    TECHNIQUE:  Low dose axial images, sagittal and coronal reformations were obtained from the lung bases to the pubic symphysis following the IV administration of 100 mL of Omnipaque 350 .  Oral contrast was not given.    COMPARISON:  CT chest abdomen and pelvis 09/22/2023    FINDINGS:  Images of the lower thorax are remarkable for bilateral dependent atelectasis/scarring.  Pacer wires noted.  There is a moderate hiatal hernia.  Liquid content within the distal esophagus suggests sequela gastroesophageal reflux.    The liver is mildly hypoattenuating, possibly reflecting steatosis, correlation with LFTs recommended.  The liver is enlarged.  The spleen, pancreas and adrenal glands are grossly unremarkable.  There is cholelithiasis/sludge, no secondary findings to suggest acute cholecystitis.  The stomach is nondistended, no gastric wall thickening.  The portal vein, splenic vein, SMV, celiac axis and SMA all are patent.  No significant abdominal lymphadenopathy.    The kidneys enhance symmetrically without hydronephrosis or nephrolithiasis.  A subcentimeter low attenuating lesion arises from the interpolar region of the right kidney.  The bilateral ureters are unremarkable without calculi seen noting the ureters cannot be followed in their entirety to the urinary bladder.  No secondary findings to suggest obstructive uropathy.  The urinary bladder is unremarkable.  The uterus is unremarkable.  The adnexa is unremarkable.    There are a few  scattered colonic diverticula without inflammation to suggest diverticulitis.  The terminal ileum is unremarkable.  The appendix or appendiceal stump is unremarkable.  There are several thickened loops of proximal jejunum noting surrounding inflammation and vascular engorgement of the mesentery.  Distally, the small bowel returns to normal caliber.  No pneumatosis or free air.  There are a few scattered shotty periaortic, pericaval, and mesenteric lymph nodes.  No focal organized pelvic fluid collection.    There is osteopenia.  There are degenerative changes of the spine.  No significant inguinal lymphadenopathy.                                       X-Ray Chest AP Portable (Final result)  Result time 01/29/25 13:40:39      Final result by James Lawson MD (01/29/25 13:40:39)                   Impression:      Cardiac defibrillator.    No acute chest disease identified.      Electronically signed by: James Lawson MD  Date:    01/29/2025  Time:    13:40               Narrative:    EXAMINATION:  XR CHEST AP PORTABLE    CLINICAL HISTORY:  Sepsis;    TECHNIQUE:  Single frontal view of the chest was performed.    COMPARISON:  11/07/2024.    FINDINGS:  There is a cardiac defibrillator in place as before.  The heart and mediastinal structures are within normal limits in size and unchanged.  Pulmonary vasculature is unremarkable.  The lungs are well aerated and free of focal consolidations.  There is no evidence for pneumothorax or large pleural effusions.  Bony structures are grossly intact.                                       Medications   iohexoL (OMNIPAQUE 350) injection 100 mL (has no administration in time range)   amLODIPine tablet 5 mg (has no administration in time range)   ARIPiprazole tablet 15 mg (has no administration in time range)   carvediloL tablet 50 mg (50 mg Oral Given 1/29/25 2127)   cyanocobalamin tablet 1,000 mcg (has no administration in time range)   donepeziL tablet 10 mg (10 mg Oral Given  1/29/25 2119)   gabapentin capsule 300 mg (300 mg Oral Given 1/29/25 2119)   losartan tablet 100 mg (has no administration in time range)   pantoprazole EC tablet 40 mg (has no administration in time range)   rivaroxaban tablet 20 mg (has no administration in time range)   atorvastatin tablet 80 mg (80 mg Oral Given 1/29/25 2119)   sertraline tablet 200 mg (has no administration in time range)   zolpidem tablet 10 mg (has no administration in time range)   sodium chloride 0.9% flush 10 mL (10 mLs Intravenous Given 1/30/25 0568)   albuterol-ipratropium 2.5 mg-0.5 mg/3 mL nebulizer solution 3 mL (has no administration in time range)   prochlorperazine injection Soln 5 mg (5 mg Intravenous Given 1/30/25 0007)   polyethylene glycol packet 17 g (has no administration in time range)   simethicone chewable tablet 80 mg (has no administration in time range)   aluminum-magnesium hydroxide-simethicone 200-200-20 mg/5 mL suspension 30 mL (has no administration in time range)   naloxone 0.4 mg/mL injection 0.02 mg (has no administration in time range)   glucose chewable tablet 16 g (has no administration in time range)   glucose chewable tablet 24 g (has no administration in time range)   dextrose 50% injection 12.5 g (has no administration in time range)   dextrose 50% injection 25 g (has no administration in time range)   glucagon (human recombinant) injection 1 mg (has no administration in time range)   acetaminophen tablet 1,000 mg (1,000 mg Oral Given 1/30/25 0609)   ketorolac injection 15 mg (15 mg Intravenous Given 1/29/25 2215)   cefTRIAXone injection 1 g (1 g Intravenous Given 1/29/25 2215)   metronidazole IVPB 500 mg (500 mg Intravenous New Bag 1/30/25 0611)   metronidazole IVPB 500 mg (0 mg Intravenous Stopped 1/29/25 1646)   cefTRIAXone injection 2 g (2 g Intravenous Given 1/29/25 1352)   HYDROmorphone injection 0.5 mg (0.5 mg Intravenous Given 1/29/25 1351)   acetaminophen tablet 1,000 mg (1,000 mg Oral Given  1/29/25 1352)   iohexoL (OMNIPAQUE 350) injection 100 mL (100 mLs Intravenous Given 1/29/25 1506)   iohexoL (OMNIPAQUE 350) injection 100 mL (100 mLs Intravenous Given 1/29/25 1516)   HYDROmorphone injection 0.5 mg (0.5 mg Intravenous Given 1/29/25 1653)   sodium chloride 0.9% bolus 1,000 mL 1,000 mL (0 mLs Intravenous Stopped 1/29/25 1841)   sodium chloride 0.9% bolus 1,000 mL 1,000 mL (0 mLs Intravenous Stopped 1/29/25 2131)   sodium chloride 0.9% bolus 1,000 mL 1,000 mL (0 mLs Intravenous Stopped 1/30/25 0244)     Medical Decision Making  In this patient with a fever, I am concerned about ruling out a serious infection and/or sepsis.  In terms of a source, possibilities include but are not limited to a UTI/pyelonephritis, pneumonia, skin/soft tissue infection, bacteremia, intra-abdominal process, discitis/osteomyelitis, septic arthritis, and endocarditis.  A viral illness like influenza, COVID is also possible.    The following tests will be ordered in order to determine the source of the patient's infection:  -Labs: blood cultures, CBC, CMP, UA, covid/flu, lactate  -Imaging studies: CXR and CT abd/pelvis  -Empiric antibiotics will be started prior to determining source of the infection; ceftriaxone and flagyl    -Will plan for frequent VS checks, close monitoring    -Disposition: admission    Amount and/or Complexity of Data Reviewed  External Data Reviewed: labs, radiology and notes.  Labs: ordered. Decision-making details documented in ED Course.  Radiology: ordered and independent interpretation performed. Decision-making details documented in ED Course.  ECG/medicine tests: ordered and independent interpretation performed. Decision-making details documented in ED Course.    Risk  OTC drugs.  Prescription drug management.  Decision regarding hospitalization.                                      Clinical Impression:  Final diagnoses:  [Z95.0] History of pacemaker  [Z13.6] Screening for cardiovascular  condition  [R50.9] Fever, unspecified fever cause (Primary)  [R10.12] LUQ abdominal pain  [R07.9] Chest pain  [R09.02] Hypoxia          ED Disposition Condition    Observation Stable                Michelle Encinas MD  01/30/25 0828

## 2025-01-29 NOTE — Clinical Note
Diagnosis: Fever, unspecified fever cause [[id number]]   Future Attending Provider: DEIDRE RIHC [[id number]]   Is the patient being sent to ED Observation?: No

## 2025-01-30 PROBLEM — I95.9 HYPOTENSION: Status: ACTIVE | Noted: 2025-01-30

## 2025-01-30 PROBLEM — K92.0 COFFEE GROUND EMESIS: Status: ACTIVE | Noted: 2025-01-30

## 2025-01-30 LAB
ABO + RH BLD: NORMAL
ANION GAP SERPL CALC-SCNC: 7 MMOL/L (ref 8–16)
BASOPHILS # BLD AUTO: 0.01 K/UL (ref 0–0.2)
BASOPHILS # BLD AUTO: 0.01 K/UL (ref 0–0.2)
BASOPHILS # BLD AUTO: 0.02 K/UL (ref 0–0.2)
BASOPHILS NFR BLD: 0.1 % (ref 0–1.9)
BASOPHILS NFR BLD: 0.1 % (ref 0–1.9)
BASOPHILS NFR BLD: 0.2 % (ref 0–1.9)
BLD GP AB SCN CELLS X3 SERPL QL: NORMAL
BUN SERPL-MCNC: 20 MG/DL (ref 6–20)
CALCIUM SERPL-MCNC: 7.7 MG/DL (ref 8.7–10.5)
CHLORIDE SERPL-SCNC: 110 MMOL/L (ref 95–110)
CO2 SERPL-SCNC: 18 MMOL/L (ref 23–29)
CREAT SERPL-MCNC: 0.9 MG/DL (ref 0.5–1.4)
DIFFERENTIAL METHOD BLD: ABNORMAL
EOSINOPHIL # BLD AUTO: 0 K/UL (ref 0–0.5)
EOSINOPHIL NFR BLD: 0 % (ref 0–8)
EOSINOPHIL NFR BLD: 0 % (ref 0–8)
EOSINOPHIL NFR BLD: 0.2 % (ref 0–8)
ERYTHROCYTE [DISTWIDTH] IN BLOOD BY AUTOMATED COUNT: 16.4 % (ref 11.5–14.5)
ERYTHROCYTE [DISTWIDTH] IN BLOOD BY AUTOMATED COUNT: 17.1 % (ref 11.5–14.5)
ERYTHROCYTE [DISTWIDTH] IN BLOOD BY AUTOMATED COUNT: 19.3 % (ref 11.5–14.5)
EST. GFR  (NO RACE VARIABLE): >60 ML/MIN/1.73 M^2
GLUCOSE SERPL-MCNC: 101 MG/DL (ref 70–110)
HCT VFR BLD AUTO: 26.6 % (ref 37–48.5)
HCT VFR BLD AUTO: 28.8 % (ref 37–48.5)
HCT VFR BLD AUTO: 29 % (ref 37–48.5)
HGB BLD-MCNC: 9 G/DL (ref 12–16)
HGB BLD-MCNC: 9.1 G/DL (ref 12–16)
HGB BLD-MCNC: 9.7 G/DL (ref 12–16)
IMM GRANULOCYTES # BLD AUTO: 0.03 K/UL (ref 0–0.04)
IMM GRANULOCYTES # BLD AUTO: 0.04 K/UL (ref 0–0.04)
IMM GRANULOCYTES # BLD AUTO: 0.06 K/UL (ref 0–0.04)
IMM GRANULOCYTES NFR BLD AUTO: 0.3 % (ref 0–0.5)
IMM GRANULOCYTES NFR BLD AUTO: 0.5 % (ref 0–0.5)
IMM GRANULOCYTES NFR BLD AUTO: 0.6 % (ref 0–0.5)
LACTATE SERPL-SCNC: 1 MMOL/L (ref 0.5–2.2)
LYMPHOCYTES # BLD AUTO: 1 K/UL (ref 1–4.8)
LYMPHOCYTES # BLD AUTO: 1.2 K/UL (ref 1–4.8)
LYMPHOCYTES # BLD AUTO: 1.2 K/UL (ref 1–4.8)
LYMPHOCYTES NFR BLD: 12.4 % (ref 18–48)
LYMPHOCYTES NFR BLD: 13.1 % (ref 18–48)
LYMPHOCYTES NFR BLD: 9.8 % (ref 18–48)
MAGNESIUM SERPL-MCNC: 1.5 MG/DL (ref 1.6–2.6)
MCH RBC QN AUTO: 30.8 PG (ref 27–31)
MCH RBC QN AUTO: 31.4 PG (ref 27–31)
MCH RBC QN AUTO: 31.7 PG (ref 27–31)
MCHC RBC AUTO-ENTMCNC: 31.4 G/DL (ref 32–36)
MCHC RBC AUTO-ENTMCNC: 33.7 G/DL (ref 32–36)
MCHC RBC AUTO-ENTMCNC: 33.8 G/DL (ref 32–36)
MCV RBC AUTO: 93 FL (ref 82–98)
MCV RBC AUTO: 94 FL (ref 82–98)
MCV RBC AUTO: 98 FL (ref 82–98)
MONOCYTES # BLD AUTO: 0.6 K/UL (ref 0.3–1)
MONOCYTES # BLD AUTO: 0.7 K/UL (ref 0.3–1)
MONOCYTES # BLD AUTO: 0.7 K/UL (ref 0.3–1)
MONOCYTES NFR BLD: 5.9 % (ref 4–15)
MONOCYTES NFR BLD: 7.3 % (ref 4–15)
MONOCYTES NFR BLD: 7.6 % (ref 4–15)
NEUTROPHILS # BLD AUTO: 7 K/UL (ref 1.8–7.7)
NEUTROPHILS # BLD AUTO: 7.4 K/UL (ref 1.8–7.7)
NEUTROPHILS # BLD AUTO: 8.9 K/UL (ref 1.8–7.7)
NEUTROPHILS NFR BLD: 79 % (ref 38–73)
NEUTROPHILS NFR BLD: 79.2 % (ref 38–73)
NEUTROPHILS NFR BLD: 83.7 % (ref 38–73)
NRBC BLD-RTO: 0 /100 WBC
OHS QRS DURATION: 150 MS
OHS QTC CALCULATION: 568 MS
PLATELET # BLD AUTO: 120 K/UL (ref 150–450)
PLATELET # BLD AUTO: 141 K/UL (ref 150–450)
PLATELET # BLD AUTO: 151 K/UL (ref 150–450)
PMV BLD AUTO: 12.1 FL (ref 9.2–12.9)
PMV BLD AUTO: 12.4 FL (ref 9.2–12.9)
PMV BLD AUTO: 12.9 FL (ref 9.2–12.9)
POTASSIUM SERPL-SCNC: 3.4 MMOL/L (ref 3.5–5.1)
RBC # BLD AUTO: 2.87 M/UL (ref 4–5.4)
RBC # BLD AUTO: 2.95 M/UL (ref 4–5.4)
RBC # BLD AUTO: 3.06 M/UL (ref 4–5.4)
SODIUM SERPL-SCNC: 135 MMOL/L (ref 136–145)
SPECIMEN OUTDATE: NORMAL
TROPONIN I SERPL DL<=0.01 NG/ML-MCNC: 6 NG/L (ref 0–14)
WBC # BLD AUTO: 10.57 K/UL (ref 3.9–12.7)
WBC # BLD AUTO: 8.88 K/UL (ref 3.9–12.7)
WBC # BLD AUTO: 9.36 K/UL (ref 3.9–12.7)

## 2025-01-30 PROCEDURE — 25000003 PHARM REV CODE 250: Performed by: PHYSICIAN ASSISTANT

## 2025-01-30 PROCEDURE — 99222 1ST HOSP IP/OBS MODERATE 55: CPT | Mod: GC,,, | Performed by: INTERNAL MEDICINE

## 2025-01-30 PROCEDURE — 63600175 PHARM REV CODE 636 W HCPCS: Performed by: PHYSICIAN ASSISTANT

## 2025-01-30 PROCEDURE — 11000001 HC ACUTE MED/SURG PRIVATE ROOM

## 2025-01-30 PROCEDURE — A4216 STERILE WATER/SALINE, 10 ML: HCPCS | Performed by: PHYSICIAN ASSISTANT

## 2025-01-30 PROCEDURE — 83735 ASSAY OF MAGNESIUM: CPT | Performed by: PHYSICIAN ASSISTANT

## 2025-01-30 PROCEDURE — 63600175 PHARM REV CODE 636 W HCPCS

## 2025-01-30 PROCEDURE — 96376 TX/PRO/DX INJ SAME DRUG ADON: CPT

## 2025-01-30 PROCEDURE — 84484 ASSAY OF TROPONIN QUANT: CPT | Performed by: STUDENT IN AN ORGANIZED HEALTH CARE EDUCATION/TRAINING PROGRAM

## 2025-01-30 PROCEDURE — 25000003 PHARM REV CODE 250: Performed by: STUDENT IN AN ORGANIZED HEALTH CARE EDUCATION/TRAINING PROGRAM

## 2025-01-30 PROCEDURE — 21400001 HC TELEMETRY ROOM

## 2025-01-30 PROCEDURE — 85025 COMPLETE CBC W/AUTO DIFF WBC: CPT | Mod: 91

## 2025-01-30 PROCEDURE — 93005 ELECTROCARDIOGRAM TRACING: CPT

## 2025-01-30 PROCEDURE — 86900 BLOOD TYPING SEROLOGIC ABO: CPT

## 2025-01-30 PROCEDURE — 96361 HYDRATE IV INFUSION ADD-ON: CPT

## 2025-01-30 PROCEDURE — 85025 COMPLETE CBC W/AUTO DIFF WBC: CPT | Performed by: PHYSICIAN ASSISTANT

## 2025-01-30 PROCEDURE — 96375 TX/PRO/DX INJ NEW DRUG ADDON: CPT

## 2025-01-30 PROCEDURE — 93010 ELECTROCARDIOGRAM REPORT: CPT | Mod: ,,, | Performed by: INTERNAL MEDICINE

## 2025-01-30 PROCEDURE — 80048 BASIC METABOLIC PNL TOTAL CA: CPT | Performed by: PHYSICIAN ASSISTANT

## 2025-01-30 PROCEDURE — 83605 ASSAY OF LACTIC ACID: CPT | Performed by: PHYSICIAN ASSISTANT

## 2025-01-30 PROCEDURE — 96366 THER/PROPH/DIAG IV INF ADDON: CPT

## 2025-01-30 PROCEDURE — 25000003 PHARM REV CODE 250

## 2025-01-30 RX ORDER — PANTOPRAZOLE SODIUM 40 MG/10ML
40 INJECTION, POWDER, LYOPHILIZED, FOR SOLUTION INTRAVENOUS 2 TIMES DAILY
Status: DISCONTINUED | OUTPATIENT
Start: 2025-01-30 | End: 2025-01-30

## 2025-01-30 RX ORDER — MAGNESIUM SULFATE HEPTAHYDRATE 40 MG/ML
2 INJECTION, SOLUTION INTRAVENOUS ONCE
Status: COMPLETED | OUTPATIENT
Start: 2025-01-30 | End: 2025-01-30

## 2025-01-30 RX ORDER — PANTOPRAZOLE SODIUM 40 MG/10ML
40 INJECTION, POWDER, LYOPHILIZED, FOR SOLUTION INTRAVENOUS 2 TIMES DAILY
Status: DISCONTINUED | OUTPATIENT
Start: 2025-01-31 | End: 2025-01-31

## 2025-01-30 RX ORDER — POTASSIUM CHLORIDE 7.45 MG/ML
10 INJECTION INTRAVENOUS
Status: COMPLETED | OUTPATIENT
Start: 2025-01-30 | End: 2025-01-30

## 2025-01-30 RX ORDER — CALCIUM CARBONATE 200(500)MG
500 TABLET,CHEWABLE ORAL 3 TIMES DAILY PRN
Status: DISCONTINUED | OUTPATIENT
Start: 2025-01-30 | End: 2025-02-02 | Stop reason: HOSPADM

## 2025-01-30 RX ORDER — ACETAMINOPHEN 500 MG
1000 TABLET ORAL EVERY 8 HOURS PRN
Status: DISCONTINUED | OUTPATIENT
Start: 2025-01-30 | End: 2025-01-31

## 2025-01-30 RX ORDER — MORPHINE SULFATE 2 MG/ML
2 INJECTION, SOLUTION INTRAMUSCULAR; INTRAVENOUS EVERY 6 HOURS PRN
Status: DISCONTINUED | OUTPATIENT
Start: 2025-01-30 | End: 2025-01-31

## 2025-01-30 RX ORDER — PANTOPRAZOLE SODIUM 40 MG/10ML
80 INJECTION, POWDER, LYOPHILIZED, FOR SOLUTION INTRAVENOUS ONCE
Status: COMPLETED | OUTPATIENT
Start: 2025-01-30 | End: 2025-01-30

## 2025-01-30 RX ADMIN — SODIUM CHLORIDE, PRESERVATIVE FREE 10 ML: 5 INJECTION INTRAVENOUS at 05:01

## 2025-01-30 RX ADMIN — METRONIDAZOLE 500 MG: 500 INJECTION, SOLUTION INTRAVENOUS at 02:01

## 2025-01-30 RX ADMIN — SODIUM CHLORIDE 1000 ML: 9 INJECTION, SOLUTION INTRAVENOUS at 01:01

## 2025-01-30 RX ADMIN — CYANOCOBALAMIN TAB 1000 MCG 1000 MCG: 1000 TAB at 08:01

## 2025-01-30 RX ADMIN — SODIUM CHLORIDE, PRESERVATIVE FREE 10 ML: 5 INJECTION INTRAVENOUS at 02:01

## 2025-01-30 RX ADMIN — MORPHINE SULFATE 2 MG: 2 INJECTION, SOLUTION INTRAMUSCULAR; INTRAVENOUS at 04:01

## 2025-01-30 RX ADMIN — PANTOPRAZOLE SODIUM 40 MG: 40 TABLET, DELAYED RELEASE ORAL at 08:01

## 2025-01-30 RX ADMIN — POTASSIUM CHLORIDE 10 MEQ: 7.46 INJECTION, SOLUTION INTRAVENOUS at 02:01

## 2025-01-30 RX ADMIN — PROCHLORPERAZINE EDISYLATE 5 MG: 5 INJECTION INTRAMUSCULAR; INTRAVENOUS at 12:01

## 2025-01-30 RX ADMIN — MAGNESIUM SULFATE HEPTAHYDRATE 2 G: 40 INJECTION, SOLUTION INTRAVENOUS at 03:01

## 2025-01-30 RX ADMIN — CEFTRIAXONE 1 G: 1 INJECTION, POWDER, FOR SOLUTION INTRAMUSCULAR; INTRAVENOUS at 11:01

## 2025-01-30 RX ADMIN — PROCHLORPERAZINE EDISYLATE 5 MG: 5 INJECTION INTRAMUSCULAR; INTRAVENOUS at 11:01

## 2025-01-30 RX ADMIN — SERTRALINE 200 MG: 100 TABLET, FILM COATED ORAL at 08:01

## 2025-01-30 RX ADMIN — METRONIDAZOLE 500 MG: 500 INJECTION, SOLUTION INTRAVENOUS at 11:01

## 2025-01-30 RX ADMIN — POTASSIUM CHLORIDE 10 MEQ: 7.46 INJECTION, SOLUTION INTRAVENOUS at 11:01

## 2025-01-30 RX ADMIN — SODIUM CHLORIDE 1000 ML: 9 INJECTION, SOLUTION INTRAVENOUS at 09:01

## 2025-01-30 RX ADMIN — PROCHLORPERAZINE EDISYLATE 5 MG: 5 INJECTION INTRAMUSCULAR; INTRAVENOUS at 05:01

## 2025-01-30 RX ADMIN — CALCIUM CARBONATE (ANTACID) CHEW TAB 500 MG 500 MG: 500 CHEW TAB at 11:01

## 2025-01-30 RX ADMIN — METRONIDAZOLE 500 MG: 500 INJECTION, SOLUTION INTRAVENOUS at 06:01

## 2025-01-30 RX ADMIN — PANTOPRAZOLE SODIUM 80 MG: 40 INJECTION, POWDER, LYOPHILIZED, FOR SOLUTION INTRAVENOUS at 11:01

## 2025-01-30 RX ADMIN — ACETAMINOPHEN 1000 MG: 500 TABLET ORAL at 02:01

## 2025-01-30 RX ADMIN — DONEPEZIL HYDROCHLORIDE 10 MG: 5 TABLET, FILM COATED ORAL at 08:01

## 2025-01-30 RX ADMIN — SODIUM CHLORIDE, PRESERVATIVE FREE 10 ML: 5 INJECTION INTRAVENOUS at 11:01

## 2025-01-30 RX ADMIN — POTASSIUM CHLORIDE 10 MEQ: 7.46 INJECTION, SOLUTION INTRAVENOUS at 03:01

## 2025-01-30 RX ADMIN — MORPHINE SULFATE 2 MG: 2 INJECTION, SOLUTION INTRAMUSCULAR; INTRAVENOUS at 11:01

## 2025-01-30 RX ADMIN — ARIPIPRAZOLE 15 MG: 5 TABLET ORAL at 08:01

## 2025-01-30 RX ADMIN — ACETAMINOPHEN 1000 MG: 500 TABLET ORAL at 06:01

## 2025-01-30 RX ADMIN — POTASSIUM CHLORIDE 10 MEQ: 7.46 INJECTION, SOLUTION INTRAVENOUS at 12:01

## 2025-01-30 NOTE — ED NOTES
Received report from RODRIGO Escamilla    LOC: The patient is awake, alert and aware of environment with an appropriate affect, the patient is oriented x 3 and speaking appropriately.   APPEARANCE: Patient appears comfortable and in no acute distress, patient is clean and well groomed.  SKIN: The skin is warm and dry, color consistent with ethnicity, patient has normal skin turgor and moist mucus membranes, skin intact, no breakdown or bruising noted.   MUSCULOSKELETAL: Patient moving all extremities spontaneously, no swelling noted.   RESPIRATORY: Airway is open and patent, respirations are spontaneous, patient has a normal effort and rate, no accessory muscle. Denies SOB, currently on RA, no labored breathing noted.  CARDIAC: Pt placed on cardiac monitor. Patient has a normal rate and regular rhythm, no edema noted, capillary refill < 3 seconds. Denies CP.  GASTRO: Soft and non tender to palpation, no distention noted. Pt is ambulatory. Pt reports right sided flank pain, lower abd pain 9/10.  : Pt denies any pain or frequency with urination. Denies hematuria.  NEURO: Pt opens eyes spontaneously, behavior appropriate to situation, follows commands, facial expression symmetrical, bilateral hand grasp equal and even, purposeful motor response noted, normal sensation in all extremities when touched with a finger.

## 2025-01-30 NOTE — PLAN OF CARE
[facility] - Emergency Dept  Initial Discharge Assessment       Primary Care Provider: Tiffany Mina MD    Admission Diagnosis: Fever, unspecified fever cause [R50.9]    Admission Date: 1/29/2025    Pt is independent with their ADL's and ambulation, does not require assistance.     Post acute was discussed with pt. Pt d/c home with no needs at the moment. Pt family/friend to provide transportation home when medically ready.     Expected Discharge Date:     Transition of Care Barriers: (P) None    Payor: [facility] MCARE / Plan: [facility] DUAL COMPLETE PPO SNP / Product Type: Medicare Advantage /     Extended Emergency Contact Information  Primary Emergency Contact: Silverio Dey  Address: [address] [place] [address] [place] [address] [facility]  Home Phone: [phone]  Mobile Phone: [phone]  Relation: Daughter  Preferred language: English    Discharge Plan A: (P) Home, Home with family  Discharge Plan B: (P) Home with family, Home      [id number] - [facility] [address] AT [facility]  [address] [place] [address]  Phone: [phone] Fax: [phone]    [facility]  [address] [facility] [address]  Phone: [phone] Fax: [phone]    [facility] - [place], [place] - [facility] Located within [facility]  [address] [facility] [address]  Phone: [phone] Fax: [phone]    [id number] - [place], [place] - [address] [place] [address] [address]  [facility] [address]  Phone: [phone] Fax: [phone]      Initial Assessment (most recent)       Adult Discharge Assessment - 01/30/25 1244          Discharge Assessment    Assessment Type Discharge Planning Assessment (P)      Confirmed/corrected address, phone number and insurance Yes (P)      Confirmed Demographics Correct on Facesheet (P)      Source of  Information patient (P)      Does patient/caregiver understand observation status Yes (P)      Reason For Admission Lower abdominal pain ,Fever ,Gastroenteritis, Coffee ground emesis (P)      People in Home grandchild(glen);child(glen), dependent (P)      Do you expect to return to your current living situation? Yes (P)      Do you have help at home or someone to help you manage your care at home? No (P)      Prior to hospitilization cognitive status: Alert/Oriented (P)      Current cognitive status: Alert/Oriented (P)      Walking or Climbing Stairs Difficulty no (P)      Dressing/Bathing Difficulty no (P)      Home Accessibility stairs to enter home (P)    Ramp    Number of Stairs, Main Entrance four (P)      Home Layout Able to live on 1st floor (P)      Equipment Currently Used at Home none (P)      Readmission within 30 days? No (P)      Patient currently being followed by outpatient case management? No (P)      Do you currently have service(s) that help you manage your care at home? No (P)      Do you take prescription medications? Yes (P)      Do you have prescription coverage? Yes (P)      Coverage [facility] DUAL COMPLETE PPO SNP (P)      Do you have any problems affording any of your prescribed medications? No (P)      Is the patient taking medications as prescribed? yes (P)      Who is going to help you get home at discharge? family/friend (P)      How do you get to doctors appointments? family or friend will provide (P)      Are you on dialysis? No (P)      Do you take coumadin? No (P)      Discharge Plan A Home;Home with family (P)      Discharge Plan B Home with family;Home (P)      DME Needed Upon Discharge  none (P)      Discharge Plan discussed with: Patient (P)      Transition of Care Barriers None (P)         Physical Activity    On average, how many days per week do you engage in moderate to strenuous exercise (like a brisk walk)? 0 days (P)      On average, how many  minutes do you engage in exercise at this level? 0 min (P)         Financial Resource Strain    How hard is it for you to pay for the very basics like food, housing, medical care, and heating? Not very hard (P)         Housing Stability    In the last 12 months, was there a time when you were not able to pay the mortgage or rent on time? No (P)      At any time in the past 12 months, were you homeless or living in a shelter (including now)? No (P)         Transportation Needs    Has the lack of transportation kept you from medical appointments, meetings, work or from getting things needed for daily living? No (P)         Food Insecurity    Within the past 12 months, you worried that your food would run out before you got the money to buy more. Never true (P)      Within the past 12 months, the food you bought just didn't last and you didn't have money to get more. Never true (P)         Stress    Do you feel stress - tense, restless, nervous, or anxious, or unable to sleep at night because your mind is troubled all the time - these days? Only a little (P)         Social Isolation    How often do you feel lonely or isolated from those around you?  Never (P)         Alcohol Use    Q1: How often do you have a drink containing alcohol? Never (P)      Q2: How many drinks containing alcohol do you have on a typical day when you are drinking? Patient does not drink (P)      Q3: How often do you have six or more drinks on one occasion? Never (P)         Utilities    In the past 12 months has the electric, gas, oil, or water company threatened to shut off services in your home? No (P)         Health Literacy    How often do you need to have someone help you when you read instructions, pamphlets, or other written material from your doctor or pharmacy? Never (P)         OTHER    Name(s) of People in Home Daughter, Grandkiantonia (P)                  CRISTI John, JADEN.   Case Management  [facility]  Email:  [email or web address]

## 2025-01-30 NOTE — SUBJECTIVE & OBJECTIVE
Past Medical History:   Diagnosis Date    Anticoagulant long-term use     Anxiety     Asthma     Atrial fibrillation     Brain anoxic injury     Cervicalgia 8/28/2014    CHI (closed head injury) 2/19/2013    Convulsion 5/30/2015    Decreased ROM of left shoulder 4/12/2017    Defibrillator activation 2013    Depression     Heart block     History of sudden cardiac arrest 2/2013    PEA arrest with subsequent long-QT    Hx of psychiatric care     Hypertension     Left atrial enlargement 4/11/2018    Pacemaker 2013    Paresthesia 11/1/2013    Prolonged Q-T interval on ECG 2/8/2013    Psychiatric problem     Seizures     Sleep difficulties     Stroke     weakness lt side    Therapy     Thyroid disease     Upper airway resistance syndrome 2/21/2017       Past Surgical History:   Procedure Laterality Date    breast reduction      CARDIAC DEFIBRILLATOR PLACEMENT      CARDIAC DEFIBRILLATOR PLACEMENT      CARPAL TUNNEL RELEASE Right     COLONOSCOPY N/A 5/7/2019    Procedure: COLONOSCOPY;  Surgeon: Juan Coffey MD;  Location: [facility] (2ND FLR);  Service: Endoscopy;  Laterality: N/A;  per DR. Bustamante Pt to have balloon with DR. Coffey due to excesive looping, could not reach cecum - see telephone encounter 3/8/19 and last procedure report dated 6/24/14/ Per Piedad schedule as 90 min case- ERW  pacemaker/AICD Boitronik/   per , ok to hold Xareltox 2 days    COLONOSCOPY N/A 6/2/2022    Procedure: COLONOSCOPY;  Surgeon: Juan Coffey MD;  Location: [facility] (2ND FLR);  Service: Endoscopy;  Laterality: N/A;  combined orders    ESOPHAGOGASTRODUODENOSCOPY N/A 6/2/2022    Procedure: EGD (ESOPHAGOGASTRODUODENOSCOPY);  Surgeon: Juan Coffey MD;  Location: [facility] (2ND FLR);  Service: Endoscopy;  Laterality: N/A;  BMI 54; pt requests 1st available [facility]  Fully vaccinated, Hold Xarelto x 2 days per Dr. Mejia and Plavix x 5 days per Dr. Grossman see telephone encounter 4/25/22, Biotronik PM/AICD, prep instr portal and mailed  -ml    HERNIA REPAIR      HIATAL HERNIA REPAIR      INSERT / REPLACE / REMOVE PACEMAKER  10/2017    CRT-D upgrade    REVISION OF IMPLANTABLE CARDIOVERTER-DEFIBRILLATOR (ICD) ELECTRODE LEAD PLACEMENT Left 12/7/2020    Procedure: REVISION, INSERTION, ELECTRODE LEAD, ICD;  Surgeon: Avelino Mejia MD;  Location: [facility] EP LAB;  Service: Cardiology;  Laterality: Left;    REVISION OF SKIN POCKET FOR CARDIOVERTER-DEFIBRILLATOR  12/7/2020    Procedure: Revision, Skin Pocket, For Cardioverter-Defibrillator;  Surgeon: Avelino Mejia MD;  Location: [facility] EP LAB;  Service: Cardiology;;    TOTAL REDUCTION MAMMOPLASTY      TRANSESOPHAGEAL ECHOCARDIOGRAPHY N/A 12/7/2020    Procedure: ECHOCARDIOGRAM, TRANSESOPHAGEAL;  Surgeon: Ivory Goodman MD;  Location: [facility] EP LAB;  Service: Cardiology;  Laterality: N/A;    TRANSESOPHAGEAL ECHOCARDIOGRAPHY N/A 12/7/2020    Procedure: ECHOCARDIOGRAM, TRANSESOPHAGEAL;  Surgeon: Patrick Diagnostic Provider;  Location: [address] FLR;  Service: Cardiology;  Laterality: N/A;    TRIGGER FINGER RELEASE Left 8/2/2019    Procedure: RELEASE, TRIGGER FINGER left middle;  Surgeon: Matt Pugh Jr., MD;  Location: [facility];  Service: Plastics;  Laterality: Left;    TRIGGER FINGER RELEASE Right 2/11/2020    Procedure: RELEASE, TRIGGER FINGER middle;  Surgeon: Matt Pugh Jr., MD;  Location: [facility];  Service: Plastics;  Laterality: Right;    TUBAL LIGATION         Review of patient's allergies indicates:   Allergen Reactions    Aspirin Hives    Imitrex [sumatriptan] Palpitations    Penicillins Hives and Swelling    Shellfish containing products Anaphylaxis     seafood    Reglan [metoclopramide hcl] Other (See Comments)     Parkinsonism        Current Facility-Administered Medications on File Prior to Encounter   Medication    cyanocobalamin tablet 100 mcg    lactated ringers infusion    lidocaine (PF) 10 mg/ml (1%) injection 5 mg    onabotulinumtoxina injection 200 Units    onabotulinumtoxina  injection 200 Units    onabotulinumtoxina injection 200 Units    onabotulinumtoxina injection 200 Units    onabotulinumtoxina injection 200 Units    onabotulinumtoxina injection 200 Units    onabotulinumtoxina injection 200 Units    onabotulinumtoxina injection 200 Units     Current Outpatient Medications on File Prior to Encounter   Medication Sig    albuterol (VENTOLIN HFA) 90 mcg/actuation inhaler Inhale 2 puffs into the lungs every 6 (six) hours as needed for Wheezing. Rescue    amLODIPine (NORVASC) 5 MG tablet Take 1 tablet (5 mg total) by mouth once daily.    ARIPiprazole (ABILIFY) 15 MG Tab Take 1 tablet (15 mg total) by mouth once daily.    carvediloL (COREG) 25 MG tablet Take 2 tablets (50 mg total) by mouth 2 (two) times daily with meals.    cetirizine (ZYRTEC) 10 MG tablet Take 1 tablet (10 mg total) by mouth once daily. for 7 days    clonazePAM (KLONOPIN) 1 MG tablet TAKE 1 TABLET BY MOUTH DAILY AS NEEDED FOR ANXIETY    cyanocobalamin, vitamin B-12, 1,000 mcg Subl Place 1,000 mcg under the tongue once daily.    donepezil (ARICEPT) 10 MG tablet Take 1 tablet (10 mg total) by mouth 2 (two) times daily.    EPINEPHrine (EPIPEN) 0.3 mg/0.3 mL AtIn Inject 0.3 mLs (0.3 mg total) into the muscle once. for 1 dose    flurbiprofen (ANSAID) 100 MG tablet Take 1 tablet (100 mg total) by mouth 2 (two) times daily as needed (migraine).    fluticasone propionate (FLONASE) 50 mcg/actuation nasal spray 1 spray (50 mcg total) by Each Nostril route once daily.    fremanezumab-vfrm (AJOVY) 225 mg/1.5 mL injection Inject 1.5 mLs (225 mg total) into the skin every 28 days.    furosemide (LASIX) 20 MG tablet Take 1 tablet (20 mg total) by mouth daily as needed. Take as needed for weight gain (2-3 lbs/day or 5 lbs/week), shortness of breath, or leg swelling    gabapentin (NEURONTIN) 300 MG capsule Take 1 capsule (300 mg total) by mouth 3 (three) times daily.    losartan (COZAAR) 100 MG tablet Take 1 tablet (100 mg total) by  mouth once daily.    magnesium 200 mg Tab Take 200 mg by mouth once daily.    mupirocin (BACTROBAN) 2 % ointment Apply topically 3 (three) times daily.    omeprazole (PRILOSEC) 40 MG capsule Take 1 capsule (40 mg total) by mouth once daily. Take 30 minutes before breakfast    ondansetron (ZOFRAN-ODT) 4 MG TbDL DISSOLVE 1 TABLET(4 MG) ON THE TONGUE EVERY 12 HOURS AS NEEDED FOR NAUSEA    polyethylene glycol (GLYCOLAX) 17 gram PwPk Take 17 g by mouth once daily.    rivaroxaban (XARELTO) 20 mg Tab Take 1 tablet (20 mg total) by mouth daily with dinner or evening meal.    rosuvastatin (CRESTOR) 40 MG Tab Take 1 tablet (40 mg total) by mouth every evening.    sertraline (ZOLOFT) 100 MG tablet Take 2 tablets (200 mg total) by mouth once daily.    tiaGABine (GABITRIL) 4 MG tablet Take 1 tablet (4 mg total) by mouth every evening.    tiZANidine (ZANAFLEX) 4 MG tablet Take 1 tablet (4 mg total) by mouth 3 (three) times daily as needed (muscle pain).    tiZANidine (ZANAFLEX) 4 MG tablet Take 1 tablet (4 mg total) by mouth 3 (three) times daily as needed (muscle pain).    ubrogepant (UBROGEPANT) 100 mg tablet Take 1 tablet (100 mg total) by mouth 2 (two) times daily as needed for Migraine.    zolpidem (AMBIEN) 10 mg Tab Take 1 tablet (10 mg total) by mouth nightly as needed (insomnia).    [DISCONTINUED] metFORMIN (GLUCOPHAGE) 500 MG tablet Take 1 tablet (500 mg total) by mouth 2 (two) times daily with meals. (Patient not taking: Reported on 1/14/2025)     Family History       Problem Relation (Age of Onset)    Asthma Daughter, Daughter    COPD Mother, Other    Glaucoma Maternal Grandmother, Paternal Grandmother    Heart failure Other    Hypertension Mother, Father          Tobacco Use    Smoking status: Never     Passive exposure: Never    Smokeless tobacco: Never   Substance and Sexual Activity    Alcohol use: Not Currently    Drug use: No    Sexual activity: Not Currently     Review of Systems   Constitutional:  Positive for  chills and fever.   Respiratory:  Negative for cough, choking and shortness of breath.    Cardiovascular:  Negative for palpitations and leg swelling.   Gastrointestinal:  Positive for abdominal pain, diarrhea, nausea and vomiting. Negative for abdominal distention and blood in stool.   Genitourinary:  Negative for difficulty urinating, dysuria and hematuria.   Neurological:  Negative for dizziness and facial asymmetry.     Objective:     Vital Signs (Most Recent):  Temp: 98.6 °F (37 °C) (01/30/25 0832)  Pulse: 71 (01/30/25 0836)  Resp: 19 (01/30/25 0832)  BP: (!) 96/54 (01/30/25 0836)  SpO2: (!) 94 % (01/30/25 0836) Vital Signs (24h Range):  Temp:  [98.4 °F (36.9 °C)-102.9 °F (39.4 °C)] 98.6 °F (37 °C)  Pulse:  [58-96] 71  Resp:  [16-22] 19  SpO2:  [92 %-98 %] 94 %  BP: ()/(37-81) 96/54     Weight: 131.1 kg (289 lb)  Body mass index is 49.61 kg/m².     Physical Exam  Constitutional:       General: She is not in acute distress.     Appearance: She is ill-appearing. She is not toxic-appearing or diaphoretic.   HENT:      Head: Normocephalic and atraumatic.      Mouth/Throat:      Mouth: Mucous membranes are dry.   Eyes:      General: No scleral icterus.     Extraocular Movements: Extraocular movements intact.   Cardiovascular:      Rate and Rhythm: Normal rate and regular rhythm.      Heart sounds: No murmur heard.     No gallop.   Pulmonary:      Effort: Pulmonary effort is normal.      Breath sounds: Normal breath sounds.   Abdominal:      General: There is no distension.      Palpations: Abdomen is soft.      Tenderness: There is abdominal tenderness. There is no guarding.   Skin:     General: Skin is warm and dry.   Neurological:      Mental Status: She is alert and oriented to person, place, and time.   Psychiatric:         Behavior: Behavior normal.                Significant Labs: All pertinent labs within the past 24 hours have been reviewed.  CBC:   Recent Labs   Lab 01/29/25  1302 01/30/25  0334   WBC  6.96 8.88   HGB 11.3* 9.0*   HCT 33.6* 26.6*    141*     CMP:   Recent Labs   Lab 01/29/25  1302 01/30/25  0334   * 135*   K 3.4* 3.4*    110   CO2 23 18*   * 101   BUN 12 20   CREATININE 0.8 0.9   CALCIUM 8.6* 7.7*   PROT 10.8*  --    ALBUMIN 2.9*  --    BILITOT 0.7  --    ALKPHOS 66  --    AST 14  --    ALT 15  --    ANIONGAP 6* 7*     Lipase:   Recent Labs   Lab 01/29/25  1420   LIPASE 11     Troponin:   Recent Labs   Lab 01/30/25  0155   TROPONINIHS 6       Significant Imaging:  CT Abdomen Pelvis With IV Contrast NO Oral Contrast   Final Result   Abnormal      This report was flagged in Epic as abnormal.      1. Findings suggesting infectious or inflammatory enteritis involving the proximal small bowel.  Correlation and follow-up is advised, no high-grade obstruction.   2. There is hepatomegaly noting possible steatosis, correlation with LFTs recommended.   3. Cholelithiasis, no secondary findings to suggest acute cholecystitis.   4. Moderate hiatal hernia.   5. Please see above for additional findings.         Electronically signed by: James Rosales MD   Date:    01/29/2025   Time:    15:26      X-Ray Chest AP Portable   Final Result      Cardiac defibrillator.      No acute chest disease identified.         Electronically signed by: James Lawson MD   Date:    01/29/2025   Time:    13:40

## 2025-01-30 NOTE — ED TRIAGE NOTES
"Amna Chawla, a 59 y.o. female presents to the ED w/ complaint of LUQ abd pain, fever, N/V. Hx: Pacemaker, Cardiac arrest, a-fib, HTN.    Triage note:  Chief Complaint   Patient presents with    Flank Pain     Pt reports left flank pain,  "I don't know if my pacemaker came loose"; +vomiting since this am     Review of patient's allergies indicates:   Allergen Reactions    Aspirin Hives    Imitrex [sumatriptan] Palpitations    Penicillins Hives and Swelling    Shellfish containing products Anaphylaxis     seafood    Reglan [metoclopramide hcl] Other (See Comments)     Parkinsonism      Past Medical History:   Diagnosis Date    Anticoagulant long-term use     Anxiety     Asthma     Atrial fibrillation     Brain anoxic injury     Cervicalgia 8/28/2014    CHI (closed head injury) 2/19/2013    Convulsion 5/30/2015    Decreased ROM of left shoulder 4/12/2017    Defibrillator activation 2013    Depression     Heart block     History of sudden cardiac arrest 2/2013    PEA arrest with subsequent long-QT    Hx of psychiatric care     Hypertension     Left atrial enlargement 4/11/2018    Pacemaker 2013    Paresthesia 11/1/2013    Prolonged Q-T interval on ECG 2/8/2013    Psychiatric problem     Seizures     Sleep difficulties     Stroke     weakness lt side    Therapy     Thyroid disease     Upper airway resistance syndrome 2/21/2017       "

## 2025-01-30 NOTE — PHARMACY MED REC
"Admission Medication History     The home medication history was taken by Shilpa Martinez.    You may go to "Admission" then "Reconcile Home Medications" tabs to review and/or act upon these items.     The home medication list has been updated by the Pharmacy department.   Please read ALL comments highlighted in yellow.   Please address this information as you see fit.    Feel free to contact us if you have any questions or require assistance.      The medications listed below were removed from the home medication list. Please reorder if appropriate:  Patient reports no longer taking the following medication(s):  Albuterol 90 mcg/actuation inhaler  Aripiprazole 15 mg  Cetirizine 10 mg  Cyanocobalamin 100 mcg  Cyanocobalamin 1,000 mcg  Donepezil 10 mg  Flurbiprofen 100 mg  Fluticasone propionate 50 mcg/actuation nasal spray  Fremanezumab-vfrm 225 mg/1.5 ml injection  Gabapentin 300 mg  Magnesium 200 mg  Mupirocin 2% ointment  Ondansetron ODT 4 mg  Sertraline 100 mg  Tiagabine 4 mg  Tizanidine 4 mg  Ubrogepant 100 mg     Medications listed below were obtained from: Patient/family and Analytic software- [employer]  Current Outpatient Medications on File Prior to Encounter   Medication Sig    amLODIPine (NORVASC) 5 MG tablet Take 1 tablet (5 mg total) by mouth once daily.    carvediloL (COREG) 25 MG tablet Take 2 tablets (50 mg total) by mouth 2 (two) times daily with meals.  No fill history    clonazePAM (KLONOPIN) 1 MG tablet TAKE 1 TABLET BY MOUTH DAILY AS NEEDED FOR ANXIETY    losartan (COZAAR) 100 MG tablet Take 1 tablet (100 mg total) by mouth once daily.    omeprazole (PRILOSEC) 40 MG capsule Take 1 capsule (40 mg total) by mouth once daily. Take 30 minutes before breakfast    polyethylene glycol (GLYCOLAX) 17 gram PwPk Take 17 g by mouth once daily.    rivaroxaban (XARELTO) 20 mg Tab Take 1 tablet (20 mg total) by mouth daily with dinner or evening meal.    rosuvastatin (CRESTOR) 40 MG Tab Take 1 tablet (40 mg " total) by mouth every evening.    zolpidem (AMBIEN) 10 mg Tab Take 1 tablet (10 mg total) by mouth nightly as needed (insomnia).    EPINEPHrine (EPIPEN) 0.3 mg/0.3 mL AtIn Inject 0.3 mLs (0.3 mg total) into the muscle once. for 1 dose    furosemide (LASIX) 20 MG tablet Take 1 tablet (20 mg total) by mouth daily as needed. Take as needed for weight gain (2-3 lbs/day or 5 lbs/week), shortness of breath, or leg swelling  Hasn't started        Shilpa Martinez  EXT [id number]                  .

## 2025-01-30 NOTE — ASSESSMENT & PLAN NOTE
60 yo F complaining of left upper quadrant abdominal pain and flank pain. Patient reports that symptoms started on 1/29, pain is severe and associated with nausea and vomiting. Initially no diarrhea, but now having loose bowel movements. Febrile to 102.9 and hypotensive to 72/37 in ED. Imaging suggesting infectious or inflammatory enteritis involving the proximal small bowel.    Plan:  - Admit to [facility]  - Continue ceftriaxone and flagyl  - Stool culture  - Fecal calprotectin  - Follow blood cultures

## 2025-01-30 NOTE — ASSESSMENT & PLAN NOTE
Hypotensive to 72/37. Resuscitated 4L crystalloid. BP now 179/114 but pt c/o 9/10 pain.    Plan:  - Hold home carvedilol, losartan, and amlodipine  - Add back as clinically indicated

## 2025-01-30 NOTE — CARE UPDATE
[facility] SIRS/Sepsis Work-up Follow Up Note     Patient's SIRS/sepsis care was reviewed, and yes a perfusion exam was performed after fluid challenge which shows adequate tissue perfusion by non-invasive monitoring on 01/30/2025.     Patient has received appropriate SIRS/sepsis care and a fluid challenge of 4 L IVF bolus.     She initially received 2 L on admission, then subsequently required 2 additional L this morning. BP improved after fluid challenges and remains stable.       Teri Dumont, PAPacoC  [facility]

## 2025-01-30 NOTE — HPI
"Amna Chawla is a 59 y.o. F w/ a PMH s/f of cardiac arrest (s/p AICD Feb 2013), stroke with anoxic brain injury, a-fib, CRT, migraine, and hypertension who presents with left upper quadrant pain and left flank pain. She reports the pain began yesterday morning around 530am. She reports this pain is unlike anything she has experienced before. The pain has not been controlled with the NSADs takes at home.  She vomited "clear liquid" two times before she arrived in the ED, twice in the ED, and once this morning. Her most recent episode of emesis was coffee-ground of approximately 25 ml. She endorses diarrhea that began this morning. She also endorses cough, headache, and heartburn.    In the ED VS notable for a triage T 102.9 and hypotension 72/37 at one point. Labs remarkable for Hgb drop from 11 -> 9 and K 3.4. Imaging remarkable for findings suggesting infectious or inflammatory enteritis involving the proximal small bowel.    Admit to [facility] 5   "

## 2025-01-30 NOTE — H&P
ED Observation Unit  History and Physical      I assumed care of this patient from the Main ED at onset of observation time, 18:30 on 01/29/2025.       History of Present Illness:    59-year-old female, history of AFib on anticoagulation, cardiac arrest, pacemaker, hypertension, seizures, complaining of left upper quadrant abdominal pain and flank pain. Patient reports that symptoms started this morning, pain is severe and associated with nausea and vomiting but no diarrhea. She denies any URI symptoms or urinary symptoms. She took ibuprofen prior to arrival.     I reviewed the ED Provider Note dated 1/29/25 prior to my evaluation of this patient.  I reviewed all labs and imaging performed in the Main ED, prior to patient being placed in Observation. Patient was placed in the ED Observation Unit for Lower abdominal pain.    PMHx   Past Medical History:   Diagnosis Date    Anticoagulant long-term use     Anxiety     Asthma     Atrial fibrillation     Brain anoxic injury     Cervicalgia 8/28/2014    CHI (closed head injury) 2/19/2013    Convulsion 5/30/2015    Decreased ROM of left shoulder 4/12/2017    Defibrillator activation 2013    Depression     Heart block     History of sudden cardiac arrest 2/2013    PEA arrest with subsequent long-QT    Hx of psychiatric care     Hypertension     Left atrial enlargement 4/11/2018    Pacemaker 2013    Paresthesia 11/1/2013    Prolonged Q-T interval on ECG 2/8/2013    Psychiatric problem     Seizures     Sleep difficulties     Stroke     weakness lt side    Therapy     Thyroid disease     Upper airway resistance syndrome 2/21/2017      Past Surgical History:   Procedure Laterality Date    breast reduction      CARDIAC DEFIBRILLATOR PLACEMENT      CARDIAC DEFIBRILLATOR PLACEMENT      CARPAL TUNNEL RELEASE Right     COLONOSCOPY N/A 5/7/2019    Procedure: COLONOSCOPY;  Surgeon: Juan Coffey MD;  Location: [facility] ([address] [place] [address]);  Service: Endoscopy;  Laterality: N/A;  per DR. Bustamante  Pt to have balloon with DR. Coffey due to excesive looping, could not reach cecum - see telephone encounter 3/8/19 and last procedure report dated 6/24/14/ Per Piedad schedule as 90 min case- ERW  pacemaker/AICD Boitronik/   per , ok to hold Xareltox 2 days    COLONOSCOPY N/A 6/2/2022    Procedure: COLONOSCOPY;  Surgeon: uJan Coffey MD;  Location: [facility] (2ND FLR);  Service: Endoscopy;  Laterality: N/A;  combined orders    ESOPHAGOGASTRODUODENOSCOPY N/A 6/2/2022    Procedure: EGD (ESOPHAGOGASTRODUODENOSCOPY);  Surgeon: Juan Coffey MD;  Location: [facility] ENDO (2ND FLR);  Service: Endoscopy;  Laterality: N/A;  BMI 54; pt requests 1st available [facility]  Fully vaccinated, Hold Xarelto x 2 days per Dr. Mejia and Plavix x 5 days per Dr. Grossman see telephone encounter 4/25/22, Biotronik PM/AICD, prep instr portal and mailed -ml    HERNIA REPAIR      HIATAL HERNIA REPAIR      INSERT / REPLACE / REMOVE PACEMAKER  10/2017    CRT-D upgrade    REVISION OF IMPLANTABLE CARDIOVERTER-DEFIBRILLATOR (ICD) ELECTRODE LEAD PLACEMENT Left 12/7/2020    Procedure: REVISION, INSERTION, ELECTRODE LEAD, ICD;  Surgeon: Avelino Mejia MD;  Location: [facility] EP LAB;  Service: Cardiology;  Laterality: Left;    REVISION OF SKIN POCKET FOR CARDIOVERTER-DEFIBRILLATOR  12/7/2020    Procedure: Revision, Skin Pocket, For Cardioverter-Defibrillator;  Surgeon: Avelino Mejia MD;  Location: [facility] EP LAB;  Service: Cardiology;;    TOTAL REDUCTION MAMMOPLASTY      TRANSESOPHAGEAL ECHOCARDIOGRAPHY N/A 12/7/2020    Procedure: ECHOCARDIOGRAM, TRANSESOPHAGEAL;  Surgeon: Ivory Goodman MD;  Location: [facility] EP LAB;  Service: Cardiology;  Laterality: N/A;    TRANSESOPHAGEAL ECHOCARDIOGRAPHY N/A 12/7/2020    Procedure: ECHOCARDIOGRAM, TRANSESOPHAGEAL;  Surgeon: [facility] Provider;  Location: [facility] OR 2ND FLR;  Service: Cardiology;  Laterality: N/A;    TRIGGER FINGER RELEASE Left 8/2/2019    Procedure: RELEASE, TRIGGER FINGER left middle;  Surgeon:  Matt Pugh Jr., MD;  Location: [facility] OR;  Service: Plastics;  Laterality: Left;    TRIGGER FINGER RELEASE Right 2/11/2020    Procedure: RELEASE, TRIGGER FINGER middle;  Surgeon: Matt Pugh Jr., MD;  Location: [facility] OR;  Service: Plastics;  Laterality: Right;    TUBAL LIGATION          Family Hx   Family History   Problem Relation Name Age of Onset    COPD Mother      Hypertension Mother      Hypertension Father      Asthma Daughter Donna     Asthma Daughter Lauryn     Glaucoma Maternal Grandmother      Glaucoma Paternal Grandmother      COPD Other      Heart failure Other          Social Hx   Social History     Socioeconomic History    Marital status: Single   Occupational History     Employer: [employer]   Tobacco Use    Smoking status: Never     Passive exposure: Never    Smokeless tobacco: Never   Substance and Sexual Activity    Alcohol use: Not Currently    Drug use: No    Sexual activity: Not Currently   Social History Narrative    Now on disability     Social Drivers of Health     Financial Resource Strain: Low Risk  (1/16/2025)    Overall Financial Resource Strain (CARDIA)     Difficulty of Paying Living Expenses: Not hard at all   Food Insecurity: No Food Insecurity (1/16/2025)    Hunger Vital Sign     Worried About Running Out of Food in the Last Year: Never true     Ran Out of Food in the Last Year: Never true   Transportation Needs: No Transportation Needs (1/16/2025)    PRAPARE - Transportation     Lack of Transportation (Medical): No     Lack of Transportation (Non-Medical): No   Physical Activity: Insufficiently Active (1/16/2025)    Exercise Vital Sign     Days of Exercise per Week: 7 days     Minutes of Exercise per Session: 20 min   Stress: Stress Concern Present (1/16/2025)    [employer] - Occupational Stress Questionnaire     Feeling of Stress : To some extent   Housing Stability: Low Risk  (1/16/2025)    Housing Stability Vital Sign      Unable to Pay for Housing in the Last Year: No     Homeless in the Last Year: No        Vital Signs   Vitals:    01/29/25 1830 01/29/25 1900 01/29/25 1930 01/29/25 2000   BP: (!) 96/55 (!) 127/57 (!) 122/58 (!) 157/71   BP Location:       Pulse: 78 76 82 80   Resp: 19 18 (!) 22 (!) 22   Temp:    98.8 °F (37.1 °C)   TempSrc:       SpO2: 98% 97% 95% 97%   Weight:       Height:            Review of Systems  Review of Systems   Constitutional:  Positive for fever. Negative for chills, diaphoresis and malaise/fatigue.   HENT:  Negative for sore throat.    Eyes:  Negative for double vision.   Respiratory:  Negative for cough, shortness of breath and wheezing.    Cardiovascular:  Negative for chest pain, palpitations, orthopnea, leg swelling and PND.   Gastrointestinal:  Positive for abdominal pain, nausea and vomiting. Negative for blood in stool, constipation and heartburn.   Genitourinary:  Negative for hematuria.   Musculoskeletal:  Negative for falls and myalgias.   Neurological:  Negative for dizziness, loss of consciousness, weakness and headaches.   Psychiatric/Behavioral:  The patient is not nervous/anxious.        Physical Exam  Physical Exam  Vitals and nursing note reviewed.   Constitutional:       General: She is not in acute distress.     Appearance: Normal appearance. She is obese. She is not ill-appearing.   HENT:      Head: Normocephalic.      Nose: Nose normal.      Mouth/Throat:      Mouth: Mucous membranes are moist.   Cardiovascular:      Rate and Rhythm: Normal rate and regular rhythm.   Pulmonary:      Effort: Pulmonary effort is normal.      Breath sounds: Normal breath sounds.   Abdominal:      General: Abdomen is flat. Bowel sounds are normal.      Palpations: Abdomen is soft.      Tenderness: There is abdominal tenderness (lower abdomen).   Skin:     General: Skin is warm.   Neurological:      Mental Status: She is alert and oriented to person, place, and time. Mental status is at baseline.    Psychiatric:         Mood and Affect: Mood normal.         Medications:   Scheduled Meds:   [START ON 1/30/2025] amLODIPine  5 mg Oral Daily    [START ON 1/30/2025] ARIPiprazole  15 mg Oral Daily    atorvastatin  80 mg Oral QHS    carvediloL  50 mg Oral BID WM    [START ON 1/30/2025] cyanocobalamin  1,000 mcg Oral Daily    cyanocobalamin  100 mcg Oral 1 time in Clinic/HOD    donepeziL  10 mg Oral BID    gabapentin  300 mg Oral TID    [START ON 1/30/2025] losartan  100 mg Oral Daily    onabotulinumtoxina  200 Units Intramuscular Q90 Days    onabotulinumtoxina  200 Units Intramuscular Q90 Days    onabotulinumtoxina  200 Units Intramuscular Q90 Days    onabotulinumtoxina  200 Units Intramuscular Q90 Days    onabotulinumtoxina  200 Units Intramuscular Q90 Days    onabotulinumtoxina  200 Units Intramuscular Q90 Days    onabotulinumtoxina  200 Units Intramuscular Q10 weeks    onabotulinumtoxina  200 Units Intramuscular Q90 Days    [START ON 1/30/2025] pantoprazole  40 mg Oral Daily    [START ON 1/30/2025] rivaroxaban  20 mg Oral Daily with dinner    [START ON 1/30/2025] sertraline  200 mg Oral Daily    sodium chloride 0.9%  10 mL Intravenous Q8H     Continuous Infusions:  PRN Meds:.  Current Facility-Administered Medications:     acetaminophen, 650 mg, Oral, Q4H PRN    albuterol-ipratropium, 3 mL, Nebulization, Q6H PRN    aluminum-magnesium hydroxide-simethicone, 30 mL, Oral, QID PRN    dextrose 50%, 12.5 g, Intravenous, PRN    dextrose 50%, 25 g, Intravenous, PRN    glucagon (human recombinant), 1 mg, Intramuscular, PRN    glucose, 16 g, Oral, PRN    glucose, 24 g, Oral, PRN    iohexoL, 100 mL, Intravenous, ONCE PRN    naloxone, 0.02 mg, Intravenous, PRN    polyethylene glycol, 17 g, Oral, Daily PRN    prochlorperazine, 5 mg, Intravenous, Q6H PRN    simethicone, 1 tablet, Oral, QID PRN    zolpidem, 10 mg, Oral, Nightly PRN      Assessment/Plan:  Lower abdominal pain  Enteritis  Fever    - t max 102.9, currently AFVSS  on RA  - no leukocytosis or significant electrolyte abnormalities, LFT wnl, lipase wnl  - UA noninfectious  - CXR unremarkable  - CT a/p: Findings suggesting infectious or inflammatory enteritis involving the proximal small bowel. There is hepatomegaly noting possible steatosis, correlation with LFTs recommended. Cholelithiasis, no secondary findings to suggest acute cholecystitis. Moderate hiatal hernia.  - CLD  - prn antiemetics  - scheduled tylenol, prn toradol  - flagyl and ctx (no cipro d/t prolonged qt)  - continue monitoring, daily labs, follow bcx    Case was discussed with the ED provider, Dr. Mendoza

## 2025-01-30 NOTE — CONSULTS
[facility] - Emergency Dept  Gastroenterology  Consult Note    Patient Name: mAna Chawla  MRN: [id number]  Admission Date: 1/29/2025  Hospital Length of Stay: 0 days  Code Status: Full Code   Attending Provider: Ines Villalobos MD   Consulting Provider: Tomas Hercules MD  Primary Care Physician: Tiffany Mina MD  Principal Problem:Lower abdominal pain    Inpatient consult to Gastroenterology  Consult performed by: Tomas Hercules MD  Consult ordered by: Hi Whyte MD  Reason for consult: Coffee ground emesis x1        Subjective:     HPI:  59yoF with PMHx of AFib, cardiac arrest, pacemaker, on Xarelto, hypertension, and seizures who presented to the ED yesterday 1/29/25 c/o N/V. Patient reports she began have abdominal pain and vomiting yesterday and this morning began having profuse and persistent diarrhea as well. Patient reports her vomit this morning began appearing dark and coffee-ground like. She was holding an emesis bag while history was being obtained that contained dark green vomit. She reports her stool has been loose very loose and dark brown in color. Patient reports a fever of 102.9F on arrival. She endorses weakness but denies any SOB or dizziness. She has never had a similar episode to this in the past. She lives with her daughter and grandchildren, none of which are currently sick. Patient endorses taking ibuprofen 1-2x a week. Patient reports she takes a PPI everyday. She did not try anything for her symptoms prior to arrival, including pepto bismol.    Hb 1/30/25 9.0 down from 11.3 yesterday 1/29/25. Baseline Hgb per chart review around 10.    CT Abdomen Pelvis 1/29/25 impression:  -Findings suggesting infectious or inflammatory enteritis involving the proximal small bowel.  Correlation and follow-up is advised, no high-grade obstruction.  -Moderate hiatal hernia.    Colonoscopy 6/2/22 impression:  - One 3 mm polyp in the sigmoid colon, removed with a cold snare. Resected and retrieved.  (Hyperplastic polyp.)  - Diverticulosis in the transverse colon.   - The examination was otherwise normal on direct and retroflexion views.    Upper GI Endoscopy 6/2/22 impression:  - Normal esophagus.   - Esophagogastric landmarks identified.   - A Nissen fundoplication was found. The wrap appears intact.   - Medium-sized type-II paraesophageal hernia.   - Normal gastric body and antrum. Biopsied (Antral mucosa with focal complete type intestinal metaplasia, negative for   dysplasia. Background reactive/chemical gastropathy. Negative for malignancy.)   - Duodenal mucosal lymphangiectasia. Benign finding.     Colonoscopy 5/7/19 impression:  - Diverticulosis in the descending colon.   - One 10 mm polyp in the ascending colon, removed with a hot snare. Resected and retrieved. (Biopsy - Tubulovillous adenoma.)  - One 5 mm polyp in the transverse colon, removed with a cold snare. Resected and retrieved. (Biopsy - Tubular adenoma.)  - The examination was otherwise normal on direct and retroflexion views.     Upper GI Endoscopy 6/24/14 impression:  - Normal esophagus.   - Hiatus hernia.   - Normal stomach.   - Normal examined duodenum.     Colonoscopy 6/24/14 impression:  - There was significant looping of the colon.   - The examination was otherwise normal on direct and retroflexion views.     Past Medical History:   Diagnosis Date    Anticoagulant long-term use     Anxiety     Asthma     Atrial fibrillation     Brain anoxic injury     Cervicalgia 8/28/2014    CHI (closed head injury) 2/19/2013    Convulsion 5/30/2015    Decreased ROM of left shoulder 4/12/2017    Defibrillator activation 2013    Depression     Heart block     History of sudden cardiac arrest 2/2013    PEA arrest with subsequent long-QT    Hx of psychiatric care     Hypertension     Left atrial enlargement 4/11/2018    Pacemaker 2013    Paresthesia 11/1/2013    Prolonged Q-T interval on ECG 2/8/2013    Psychiatric problem     Seizures     Sleep  difficulties     Stroke     weakness lt side    Therapy     Thyroid disease     Upper airway resistance syndrome 2/21/2017       Past Surgical History:   Procedure Laterality Date    breast reduction      CARDIAC DEFIBRILLATOR PLACEMENT      CARDIAC DEFIBRILLATOR PLACEMENT      CARPAL TUNNEL RELEASE Right     COLONOSCOPY N/A 5/7/2019    Procedure: COLONOSCOPY;  Surgeon: Juan Coffey MD;  Location: [facility] (2ND FLR);  Service: Endoscopy;  Laterality: N/A;  per DR. Bustamante Pt to have balloon with DR. Coffey due to excesive looping, could not reach cecum - see telephone encounter 3/8/19 and last procedure report dated 6/24/14/ Per Piedad schedule as 90 min case- ERW  pacemaker/AICD Boitronik/   per , ok to hold Xareltox 2 days    COLONOSCOPY N/A 6/2/2022    Procedure: COLONOSCOPY;  Surgeon: Juan Coffey MD;  Location: [facility] (2ND FLR);  Service: Endoscopy;  Laterality: N/A;  combined orders    ESOPHAGOGASTRODUODENOSCOPY N/A 6/2/2022    Procedure: EGD (ESOPHAGOGASTRODUODENOSCOPY);  Surgeon: Juan Coffey MD;  Location: [facility] (2ND FLR);  Service: Endoscopy;  Laterality: N/A;  BMI 54; pt requests 1st available [facility]  Fully vaccinated, Hold Xarelto x 2 days per Dr. Mejia and Plavix x 5 days per Dr. Grossman see telephone encounter 4/25/22, Biotronik PM/AICD, prep instr portal and mailed -ml    HERNIA REPAIR      HIATAL HERNIA REPAIR      INSERT / REPLACE / REMOVE PACEMAKER  10/2017    CRT-D upgrade    REVISION OF IMPLANTABLE CARDIOVERTER-DEFIBRILLATOR (ICD) ELECTRODE LEAD PLACEMENT Left 12/7/2020    Procedure: REVISION, INSERTION, ELECTRODE LEAD, ICD;  Surgeon: Avelino Mejia MD;  Location: [facility] EP LAB;  Service: Cardiology;  Laterality: Left;    REVISION OF SKIN POCKET FOR CARDIOVERTER-DEFIBRILLATOR  12/7/2020    Procedure: Revision, Skin Pocket, For Cardioverter-Defibrillator;  Surgeon: Avelino Mejia MD;  Location: [facility] EP LAB;  Service: Cardiology;;    TOTAL REDUCTION MAMMOPLASTY      TRANSESOPHAGEAL  ECHOCARDIOGRAPHY N/A 12/7/2020    Procedure: ECHOCARDIOGRAM, TRANSESOPHAGEAL;  Surgeon: Ivory Goodman MD;  Location: [facility] EP LAB;  Service: Cardiology;  Laterality: N/A;    TRANSESOPHAGEAL ECHOCARDIOGRAPHY N/A 12/7/2020    Procedure: ECHOCARDIOGRAM, TRANSESOPHAGEAL;  Surgeon: Patrick Diagnostic Provider;  Location: [facility] OR 2ND FLR;  Service: Cardiology;  Laterality: N/A;    TRIGGER FINGER RELEASE Left 8/2/2019    Procedure: RELEASE, TRIGGER FINGER left middle;  Surgeon: Matt Pugh Jr., MD;  Location: [facility] OR;  Service: Plastics;  Laterality: Left;    TRIGGER FINGER RELEASE Right 2/11/2020    Procedure: RELEASE, TRIGGER FINGER middle;  Surgeon: Matt Pugh Jr., MD;  Location: [facility] OR;  Service: Plastics;  Laterality: Right;    TUBAL LIGATION         Review of patient's allergies indicates:   Allergen Reactions    Aspirin Hives    Imitrex [sumatriptan] Palpitations    Penicillins Hives and Swelling    Shellfish containing products Anaphylaxis     seafood    Reglan [metoclopramide hcl] Other (See Comments)     Parkinsonism      Family History       Problem Relation (Age of Onset)    Asthma Daughter, Daughter    COPD Mother, Other    Glaucoma Maternal Grandmother, Paternal Grandmother    Heart failure Other    Hypertension Mother, Father          Tobacco Use    Smoking status: Never     Passive exposure: Never    Smokeless tobacco: Never   Substance and Sexual Activity    Alcohol use: Not Currently    Drug use: No    Sexual activity: Not Currently     Review of Systems   Constitutional:  Positive for appetite change, fatigue and fever. Negative for activity change, chills and diaphoresis.   Gastrointestinal:  Positive for abdominal pain, diarrhea, nausea and vomiting. Negative for abdominal distention, anal bleeding, blood in stool, constipation and rectal pain.   Neurological:  Positive for weakness. Negative for dizziness, syncope and light-headedness.     Objective:     Vital Signs (Most  Recent):  Temp: (!) 94 °F (34.4 °C) (01/30/25 1259)  Pulse: 73 (01/30/25 1259)  Resp: 20 (01/30/25 1259)  BP: (!) 155/86 (01/30/25 1259)  SpO2: (!) 94 % (01/30/25 0836) Vital Signs (24h Range):  Temp:  [94 °F (34.4 °C)-99.8 °F (37.7 °C)] 94 °F (34.4 °C)  Pulse:  [58-96] 73  Resp:  [16-22] 20  SpO2:  [92 %-98 %] 94 %  BP: ()/(37-86) 155/86     Weight: 131.1 kg (289 lb) (01/29/25 1203)  Body mass index is 49.61 kg/m².      Intake/Output Summary (Last 24 hours) at 1/30/2025 1312  Last data filed at 1/30/2025 1044  Gross per 24 hour   Intake 2198.34 ml   Output 25 ml   Net 2173.34 ml       Lines/Drains/Airways       Drain  Duration             Female External Urinary Catheter w/ Suction 01/29/25 2130 <1 day              Peripheral Intravenous Line  Duration                  Peripheral IV - Single Lumen 01/29/25 1302 20 G Anterior;Left;Proximal Forearm 1 day                     Physical Exam  Vitals and nursing note reviewed.   Constitutional:       General: She is not in acute distress.     Appearance: She is obese. She is ill-appearing. She is not diaphoretic.   HENT:      Head: Normocephalic and atraumatic.      Mouth/Throat:      Pharynx: Oropharynx is clear.   Eyes:      General: No scleral icterus.     Extraocular Movements: Extraocular movements intact.      Conjunctiva/sclera: Conjunctivae normal.   Pulmonary:      Effort: Pulmonary effort is normal. No respiratory distress.   Abdominal:      General: There is no distension.      Palpations: Abdomen is soft. There is no mass.      Tenderness: There is abdominal tenderness. There is guarding. There is no rebound.      Hernia: No hernia is present.   Musculoskeletal:         General: Normal range of motion.      Cervical back: Normal range of motion.   Skin:     General: Skin is warm and dry.   Neurological:      Mental Status: She is alert and oriented to person, place, and time. Mental status is at baseline.   Psychiatric:         Mood and Affect: Mood  "normal.         Behavior: Behavior normal.          Significant Labs:  CBC:   Recent Labs   Lab 01/29/25  1302 01/30/25  0334 01/30/25  1128   WBC 6.96 8.88 9.36   HGB 11.3* 9.0* 9.1*   HCT 33.6* 26.6* 29.0*    141* 120*     CMP:   Recent Labs   Lab 01/29/25  1302 01/30/25  0334   * 101   CALCIUM 8.6* 7.7*   ALBUMIN 2.9*  --    PROT 10.8*  --    * 135*   K 3.4* 3.4*   CO2 23 18*    110   BUN 12 20   CREATININE 0.8 0.9   ALKPHOS 66  --    ALT 15  --    AST 14  --    BILITOT 0.7  --        Significant Imaging:  Imaging results within the past 24 hours have been reviewed.    CT A/P: There is a moderate hiatal hernia. Liquid content within the distal esophagus suggests sequela gastroesophageal reflux.  There are a few scattered colonic diverticula without inflammation to suggest diverticulitis. There are several thickened loops of proximal jejunum noting surrounding inflammation and vascular engorgement of the mesentery. Distally, the small bowel returns to normal caliber.  Assessment/Plan:     GI  Coffee ground emesis  One episode of "coffee-ground emesis" reported by primary team, roughly 25 cc. When interviewed by our team, only small amount of green emesis in emesis bag observed. Abdominal pain, frequent emesis, and constant non-bloody diarrhea. No recent sick contacts.    - Treatment for gastroenteritis per primary team  - Continue supportive care and IVF resuscitation as needed  - NPO at MN  - Continue to observe, please report to GI team if further episodes of coffee-ground emesis and quantity  - If Hgb continues to downtrend, will consider EGD tomorrow  - Trend Hgb q8hrs. Transfuse for Hgb <7, unless otherwise indicated  - Maintain IV access with 2 large bore Ivs  - Hold all NSAIDs and anticoagulants, unless contraindicated  - Bolus IV pantoprazole 80mg followed by 40mg BID  - Please correct any coagulopathy with platelets and FFP for goal of platelets >50K and INR <2.0  - Please notify " GI team if there is significant change in patient's clinical status          Thank you for your consult. I will follow-up with patient. Please contact us if you have any additional questions.    Tomas Hercules MD  Gastroenterology  [facility] - Emergency Dept

## 2025-01-30 NOTE — ASSESSMENT & PLAN NOTE
"One episode of "coffee-ground emesis" reported by primary team, roughly 25 cc. When interviewed by our team, only small amount of green emesis in emesis bag observed. Abdominal pain, frequent emesis, and constant non-bloody diarrhea. No recent sick contacts.    - Treatment for gastroenteritis per primary team  - Continue supportive care and IVF resuscitation as needed  - NPO at MN  - Continue to observe, please report to GI team if further episodes of coffee-ground emesis and quantity  - If Hgb continues to downtrend, will consider EGD tomorrow  - Trend Hgb q8hrs. Transfuse for Hgb <7, unless otherwise indicated  - Maintain IV access with 2 large bore Ivs  - Hold all NSAIDs and anticoagulants, unless contraindicated  - Bolus IV pantoprazole 80mg followed by 40mg BID  - Please correct any coagulopathy with platelets and FFP for goal of platelets >50K and INR <2.0  - Please notify GI team if there is significant change in patient's clinical status    "

## 2025-01-30 NOTE — MEDICAL/APP STUDENT
"Hospital Medicine Student   History and Physical  [facility] [place] [facility]  01/30/2025  10:49 AM    SUBJECTIVE:     Chief Complaint:  Abdominal pain, fever, and nausea/vomiting    History of Present Illness:  Amna Chawla is a 59 y.o. female with a relevant medical history of cardiac arrest (s/p AICD Feb 2013), stroke with anoxic brain injury, a-fib, and hypertension who presents with left upper quadrant pain and left flank pain. She reports the pain began yesterday morning around 530am. She reports this pain is unlike anything she has experienced before. She has vomited "clear liquid" two times before she arrived in the ED, twice in the ED, and once this morning. Her most recent episode of 25ml of emesis was coffee-ground-colored. She also reports a diarrhea that began this morning, as well as a cough, headache, and heartburn. She also reports fever of 102.2 on arrival to the ED. She reports she is normally able to ambulate well around her house and that it takes a lot for her to come to the hospital    She denies melena. She denies recent travel and sick contacts. She does not eat shellfish as she is allergic. She denies chest pain, changes in her urination, blurry vision.    She reports other past medical history of migraines and depression. She is not taking some of her medications due to insurance issues (migraine medication and Abilify), reports she cannot take Zoloft due to GI issues. She reports otherwise good compliance with her medications at home.    Her past surgical history is significant for cardiac defibrillator placement, revision of the device, and several abdominal procedures including colonoscopies, EGDs, hernia repairs, and a tubal ligation.    She reports her family history is significant for hypertension in her mother and sisters. Her 1-year old granddaughter has retinoblastoma.    She reports allergies to aspirin (hives), sumatriptan, penicillins (hives), shellfish, and metoclopramide.    She reports " no smoking or drinking history. She lives at home with her daughter and grandchildren. She has 2 daughters 20-years apart, and 3 grandchildren (2 girls who are 1-year-old and 1 boy who is 8-years-old). She reports she normally ambulates around her home with no problems, does not report stairs in the home. She does not drive.    Review of Systems   Constitutional:  Positive for fever.   HENT:  Negative for congestion and hearing loss.    Eyes:  Negative for blurred vision and photophobia.   Respiratory:  Positive for cough. Negative for hemoptysis and sputum production.    Cardiovascular:  Negative for chest pain, palpitations and leg swelling.   Gastrointestinal:  Positive for abdominal pain, diarrhea, heartburn, nausea and vomiting. Negative for blood in stool and constipation.        Left upper quadrant and flank pain.   Genitourinary:  Positive for flank pain. Negative for dysuria and urgency.        Left flank pain.   Musculoskeletal:  Negative for myalgias and neck pain.   Skin:  Negative for itching and rash.   Neurological:  Negative for dizziness, speech change, focal weakness and headaches.   Endo/Heme/Allergies:  Does not bruise/bleed easily.       HISTORY:     Past Medical History:   Diagnosis Date    Anticoagulant long-term use     Anxiety     Asthma     Atrial fibrillation     Brain anoxic injury     Cervicalgia 8/28/2014    CHI (closed head injury) 2/19/2013    Convulsion 5/30/2015    Decreased ROM of left shoulder 4/12/2017    Defibrillator activation 2013    Depression     Heart block     History of sudden cardiac arrest 2/2013    PEA arrest with subsequent long-QT    Hx of psychiatric care     Hypertension     Left atrial enlargement 4/11/2018    Pacemaker 2013    Paresthesia 11/1/2013    Prolonged Q-T interval on ECG 2/8/2013    Psychiatric problem     Seizures     Sleep difficulties     Stroke     weakness lt side    Therapy     Thyroid disease     Upper airway resistance syndrome 2/21/2017        Past Surgical History:   Procedure Laterality Date    breast reduction      CARDIAC DEFIBRILLATOR PLACEMENT      CARDIAC DEFIBRILLATOR PLACEMENT      CARPAL TUNNEL RELEASE Right     COLONOSCOPY N/A 5/7/2019    Procedure: COLONOSCOPY;  Surgeon: Juan Coffey MD;  Location: [facility] (2ND FLR);  Service: Endoscopy;  Laterality: N/A;  per DR. Bustamante Pt to have balloon with DR. Coffey due to excesive looping, could not reach cecum - see telephone encounter 3/8/19 and last procedure report dated 6/24/14/ Per Piedad schedule as 90 min case- ERW  pacemaker/AICD Boitronik/   per , ok to hold Xareltox 2 days    COLONOSCOPY N/A 6/2/2022    Procedure: COLONOSCOPY;  Surgeon: Juan Coffey MD;  Location: [facility] (2ND FLR);  Service: Endoscopy;  Laterality: N/A;  combined orders    ESOPHAGOGASTRODUODENOSCOPY N/A 6/2/2022    Procedure: EGD (ESOPHAGOGASTRODUODENOSCOPY);  Surgeon: Juan Coffey MD;  Location: [facility] (2ND FLR);  Service: Endoscopy;  Laterality: N/A;  BMI 54; pt requests 1st available [facility]  Fully vaccinated, Hold Xarelto x 2 days per Dr. Mejia and Plavix x 5 days per Dr. Grossman see telephone encounter 4/25/22, Biotronik PM/AICD, prep instr portal and mailed -ml    HERNIA REPAIR      HIATAL HERNIA REPAIR      INSERT / REPLACE / REMOVE PACEMAKER  10/2017    CRT-D upgrade    REVISION OF IMPLANTABLE CARDIOVERTER-DEFIBRILLATOR (ICD) ELECTRODE LEAD PLACEMENT Left 12/7/2020    Procedure: REVISION, INSERTION, ELECTRODE LEAD, ICD;  Surgeon: Avelino Mejia MD;  Location: [facility] EP LAB;  Service: Cardiology;  Laterality: Left;    REVISION OF SKIN POCKET FOR CARDIOVERTER-DEFIBRILLATOR  12/7/2020    Procedure: Revision, Skin Pocket, For Cardioverter-Defibrillator;  Surgeon: Avelino Mejia MD;  Location: [facility] LAB;  Service: Cardiology;;    TOTAL REDUCTION MAMMOPLASTY      TRANSESOPHAGEAL ECHOCARDIOGRAPHY N/A 12/7/2020    Procedure: ECHOCARDIOGRAM, TRANSESOPHAGEAL;  Surgeon: Ivory Goodman MD;  Location:  [facility] EP LAB;  Service: Cardiology;  Laterality: N/A;    TRANSESOPHAGEAL ECHOCARDIOGRAPHY N/A 12/7/2020    Procedure: ECHOCARDIOGRAM, TRANSESOPHAGEAL;  Surgeon: Patrick Diagnostic Provider;  Location: [facility] OR [facility] FLR;  Service: Cardiology;  Laterality: N/A;    TRIGGER FINGER RELEASE Left 8/2/2019    Procedure: RELEASE, TRIGGER FINGER left middle;  Surgeon: Matt Pugh Jr., MD;  Location: [facility] OR;  Service: Plastics;  Laterality: Left;    TRIGGER FINGER RELEASE Right 2/11/2020    Procedure: RELEASE, TRIGGER FINGER middle;  Surgeon: Matt Pugh Jr., MD;  Location: [facility];  Service: Plastics;  Laterality: Right;    TUBAL LIGATION         Family History   Problem Relation Name Age of Onset    COPD Mother      Hypertension Mother      Hypertension Father      Asthma Daughter Donna     Asthma Daughter Lauryn     Glaucoma Maternal Grandmother      Glaucoma Paternal Grandmother      COPD Other      Heart failure Other         Social History     Socioeconomic History    Marital status: Single   Occupational History     Employer: [employer]   Tobacco Use    Smoking status: Never     Passive exposure: Never    Smokeless tobacco: Never   Substance and Sexual Activity    Alcohol use: Not Currently    Drug use: No    Sexual activity: Not Currently   Social History Narrative    Now on disability     Social Drivers of Health     Financial Resource Strain: Low Risk  (1/16/2025)    Overall Financial Resource Strain (CARDIA)     Difficulty of Paying Living Expenses: Not hard at all   Food Insecurity: No Food Insecurity (1/16/2025)    Hunger Vital Sign     Worried About Running Out of Food in the Last Year: Never true     Ran Out of Food in the Last Year: Never true   Transportation Needs: No Transportation Needs (1/16/2025)    PRAPARE - Transportation     Lack of Transportation (Medical): No     Lack of Transportation (Non-Medical): No   Physical Activity: Insufficiently Active (1/16/2025)    Exercise Vital  Sign     Days of Exercise per Week: 7 days     Minutes of Exercise per Session: 20 min   Stress: Stress Concern Present (1/16/2025)    [employer] - Occupational Stress Questionnaire     Feeling of Stress : To some extent   Housing Stability: Low Risk  (1/16/2025)    Housing Stability Vital Sign     Unable to Pay for Housing in the Last Year: No     Homeless in the Last Year: No       Social Determinants of Health:  - never smoker  - never alcohol  - does not drive  - currently on medication for depression  - lives with her young grandchildren and daughter  - reports she is normally able to ambulate well around her house and that it takes a lot for her to come to the hospital  - reports she isn't taking some of her medications due to insurance not covering them      MEDICATIONS & ALLERGIES:     Current Facility-Administered Medications on File Prior to Encounter   Medication Dose Route Frequency Provider Last Rate Last Admin    cyanocobalamin tablet 100 mcg  100 mcg Oral 1 time in Clinic/HOD         lactated ringers infusion   Intravenous Continuous Humberto Angel MD   Stopped at 02/11/20 0801    lidocaine (PF) 10 mg/ml (1%) injection 5 mg  0.5 mL Intradermal Once Humberto Angel MD        onabotulinumtoxina injection 200 Units  200 Units Intramuscular Q90 Days David Cortez MD   200 Units at 10/19/18 1713    onabotulinumtoxina injection 200 Units  200 Units Intramuscular Q90 Days David Cortez MD   200 Units at 12/28/18 1106    onabotulinumtoxina injection 200 Units  200 Units Intramuscular Q90 Days David Cortez MD   200 Units at 03/13/19 1504    onabotulinumtoxina injection 200 Units  200 Units Intramuscular Q90 Days David Cortez MD   200 Units at 05/23/19 1123    onabotulinumtoxina injection 200 Units  200 Units Intramuscular Q90 Days David Cortez MD   200 Units at 10/11/19 1112    onabotulinumtoxina injection 200 Units  200 Units Intramuscular Q90 Days David Cortez  MD   200 Units at 12/19/19 1036    onabotulinumtoxina injection 200 Units  200 Units Intramuscular Q10 weeks David Cortez MD   200 Units at 12/27/24 1030    onabotulinumtoxina injection 200 Units  200 Units Intramuscular Q90 Days David Cortez MD   200 Units at 01/04/24 1349     Current Outpatient Medications on File Prior to Encounter   Medication Sig Dispense Refill    albuterol (VENTOLIN HFA) 90 mcg/actuation inhaler Inhale 2 puffs into the lungs every 6 (six) hours as needed for Wheezing. Rescue 18 g 0    amLODIPine (NORVASC) 5 MG tablet Take 1 tablet (5 mg total) by mouth once daily. 90 tablet 3    ARIPiprazole (ABILIFY) 15 MG Tab Take 1 tablet (15 mg total) by mouth once daily. 90 tablet 3    carvediloL (COREG) 25 MG tablet Take 2 tablets (50 mg total) by mouth 2 (two) times daily with meals. 360 tablet 3    cetirizine (ZYRTEC) 10 MG tablet Take 1 tablet (10 mg total) by mouth once daily. for 7 days      clonazePAM (KLONOPIN) 1 MG tablet TAKE 1 TABLET BY MOUTH DAILY AS NEEDED FOR ANXIETY 30 tablet 5    cyanocobalamin, vitamin B-12, 1,000 mcg Subl Place 1,000 mcg under the tongue once daily. 90 tablet 3    donepezil (ARICEPT) 10 MG tablet Take 1 tablet (10 mg total) by mouth 2 (two) times daily. 180 tablet 3    EPINEPHrine (EPIPEN) 0.3 mg/0.3 mL AtIn Inject 0.3 mLs (0.3 mg total) into the muscle once. for 1 dose 0.3 mL 1    flurbiprofen (ANSAID) 100 MG tablet Take 1 tablet (100 mg total) by mouth 2 (two) times daily as needed (migraine). 12 tablet 12    fluticasone propionate (FLONASE) 50 mcg/actuation nasal spray 1 spray (50 mcg total) by Each Nostril route once daily. 9.9 mL 0    fremanezumab-vfrm (AJOVY) 225 mg/1.5 mL injection Inject 1.5 mLs (225 mg total) into the skin every 28 days. 1 each 12    furosemide (LASIX) 20 MG tablet Take 1 tablet (20 mg total) by mouth daily as needed. Take as needed for weight gain (2-3 lbs/day or 5 lbs/week), shortness of breath, or leg swelling 45 tablet 3    gabapentin  (NEURONTIN) 300 MG capsule Take 1 capsule (300 mg total) by mouth 3 (three) times daily. 270 capsule 1    losartan (COZAAR) 100 MG tablet Take 1 tablet (100 mg total) by mouth once daily. 90 tablet 3    magnesium 200 mg Tab Take 200 mg by mouth once daily. 30 each 12    mupirocin (BACTROBAN) 2 % ointment Apply topically 3 (three) times daily. 22 g 0    omeprazole (PRILOSEC) 40 MG capsule Take 1 capsule (40 mg total) by mouth once daily. Take 30 minutes before breakfast 90 capsule 3    ondansetron (ZOFRAN-ODT) 4 MG TbDL DISSOLVE 1 TABLET(4 MG) ON THE TONGUE EVERY 12 HOURS AS NEEDED FOR NAUSEA 40 tablet 12    polyethylene glycol (GLYCOLAX) 17 gram PwPk Take 17 g by mouth once daily. 20 each 12    rivaroxaban (XARELTO) 20 mg Tab Take 1 tablet (20 mg total) by mouth daily with dinner or evening meal. 30 tablet 11    rosuvastatin (CRESTOR) 40 MG Tab Take 1 tablet (40 mg total) by mouth every evening. 90 tablet 3    sertraline (ZOLOFT) 100 MG tablet Take 2 tablets (200 mg total) by mouth once daily. 180 tablet 3    tiaGABine (GABITRIL) 4 MG tablet Take 1 tablet (4 mg total) by mouth every evening. 30 tablet 12    tiZANidine (ZANAFLEX) 4 MG tablet Take 1 tablet (4 mg total) by mouth 3 (three) times daily as needed (muscle pain). 40 tablet 12    tiZANidine (ZANAFLEX) 4 MG tablet Take 1 tablet (4 mg total) by mouth 3 (three) times daily as needed (muscle pain). 40 tablet 12    ubrogepant (UBROGEPANT) 100 mg tablet Take 1 tablet (100 mg total) by mouth 2 (two) times daily as needed for Migraine. 10 tablet 12    zolpidem (AMBIEN) 10 mg Tab Take 1 tablet (10 mg total) by mouth nightly as needed (insomnia). 30 tablet 5    [DISCONTINUED] metFORMIN (GLUCOPHAGE) 500 MG tablet Take 1 tablet (500 mg total) by mouth 2 (two) times daily with meals. (Patient not taking: Reported on 1/14/2025) 180 tablet 3     Patient reports she isn't able to take some of her medications due to insurance issues - namely her Abilify and migraine  medication.    Review of patient's allergies indicates:   Allergen Reactions    Aspirin Hives    Imitrex [sumatriptan] Palpitations    Penicillins Hives and Swelling    Shellfish containing products Anaphylaxis     seafood    Reglan [metoclopramide hcl] Other (See Comments)     Parkinsonism        OBJECTIVE:     Vital Signs Recent:  Temp: 98.6 °F (37 °C) (01/30/25 0832)  Pulse: 71 (01/30/25 0836)  Resp: 19 (01/30/25 0832)  BP: (!) 96/54 (01/30/25 0836)  SpO2: (!) 94 % (01/30/25 0836)  Oxygen Documentation:                Device (Oxygen Therapy): room air         Vital Signs Range (Last 24H):  Temp:  [98.4 °F (36.9 °C)-102.9 °F (39.4 °C)]   Pulse:  [58-96]   Resp:  [16-22]   BP: ()/(37-81)   SpO2:  [92 %-98 %]        Weight:  Body mass index is 49.61 kg/m².  Wt Readings from Last 3 Encounters:   01/29/25 131.1 kg (289 lb)   01/17/25 132.2 kg (291 lb 7.2 oz)   01/14/25 132 kg (291 lb 0.1 oz)        Physical Exam  Constitutional:       Appearance: She is obese.   HENT:      Head: Normocephalic.      Mouth/Throat:      Mouth: Mucous membranes are moist.      Pharynx: Oropharynx is clear.   Cardiovascular:      Rate and Rhythm: Normal rate and regular rhythm.      Pulses: Normal pulses.      Heart sounds: Normal heart sounds.   Pulmonary:      Effort: Pulmonary effort is normal.   Abdominal:      Tenderness: There is abdominal tenderness. There is left CVA tenderness.      Comments: LUQ, mid-abdominal, and left flank pain on palpation.   Musculoskeletal:      Right lower leg: No edema.      Left lower leg: No edema.   Skin:     General: Skin is warm and dry.   Neurological:      General: No focal deficit present.      Mental Status: She is alert and oriented to person, place, and time.       Sodium (mmol/L)   Date Value   01/30/2025 135 (L)   01/29/2025 134 (L)   11/07/2024 135 (L)     Potassium (mmol/L)   Date Value   01/30/2025 3.4 (L)   01/29/2025 3.4 (L)   11/07/2024 3.7     Chloride (mmol/L)   Date Value    01/30/2025 110   01/29/2025 105   11/07/2024 106     CO2 (mmol/L)   Date Value   01/30/2025 18 (L)   01/29/2025 23   11/07/2024 23     BUN (mg/dL)   Date Value   01/30/2025 20   01/29/2025 12   11/07/2024 13     Creatinine (mg/dL)   Date Value   01/30/2025 0.9   01/29/2025 0.8   11/07/2024 0.8     Glucose (mg/dL)   Date Value   01/30/2025 101   01/29/2025 111 (H)   11/07/2024 113 (H)     Calcium (mg/dL)   Date Value   01/30/2025 7.7 (L)   01/29/2025 8.6 (L)   11/07/2024 8.7     Magnesium (mg/dL)   Date Value   01/30/2025 1.5 (L)   11/02/2020 2.0   11/02/2020 1.7     Phosphorus (mg/dL)   Date Value   11/02/2020 3.6   09/29/2018 3.0   04/12/2018 3.5     Alkaline Phosphatase (U/L)   Date Value   01/29/2025 66   11/07/2024 59   04/02/2024 59     ALT (U/L)   Date Value   01/29/2025 15   11/07/2024 11   04/02/2024 13     AST (U/L)   Date Value   01/29/2025 14   11/07/2024 11   04/02/2024 10     Albumin (g/dL)   Date Value   01/29/2025 2.9 (L)   11/07/2024 2.9 (L)   04/02/2024 2.8 (L)     Total Protein (g/dL)   Date Value   01/29/2025 10.8 (H)   11/07/2024 9.6 (H)   04/02/2024 9.3 (H)     Total Bilirubin (mg/dL)   Date Value   01/29/2025 0.7   11/07/2024 0.5   04/02/2024 0.2     POC PTINR (no units)   Date Value   05/17/2022 1.9 (H)   09/28/2018 1.4 (H)   04/11/2018 1.0     INR (no units)   Date Value   05/17/2022 1.3 (H)   11/12/2020 1.0   09/14/2020 0.9       WBC (K/uL)   Date Value   01/30/2025 8.88   01/29/2025 6.96   11/07/2024 5.95     Hemoglobin (g/dL)   Date Value   01/30/2025 9.0 (L)   01/29/2025 11.3 (L)   11/07/2024 11.1 (L)     Platelets (K/uL)   Date Value   01/30/2025 141 (L)   01/29/2025 189   11/07/2024 182       Diagnostic Results:  CXR - WNL with cardiac defibrillator  CT abdo/pelvis - findings consistent with infectious or inflammatory enteritis of proximal small bowel, cholelithiasis, hepatomegaly possible steatosis, hiatal hernia    ASSESSMENT -- PLAN:   Amna Chawla is a 59 y.o. female with a  relevant medical history of cardiac arrest (s/p AICD Feb 2013), stroke with anoxic brain injury, a-fib, and hypertension who is being treated for Lower abdominal pain.    Patient is presenting with significant abdominal pain in the left upper quadrant, mid-abdomen, and left flank. She has had 5 episodes of clear emesis and 1 episode of 25ml of coffee-ground emesis. CT abdo/pelvis consistent with possible enteritis.     Given patient's emesis that has turned coffee-ground, high suspicion for GI bleed. She has a history of anticoagulants and NSAID use for her migraine, which could contribute to ulcer formation. She reports significant heartburn which could be due to an ulcer. She has some post-prandial pain which may be consistent with an mesenteric ischemia picture, but her lack of bloody diarrhea and acute onset of symptoms points less to this.    Upper-GI Bleed  Patient is presenting with significant abdominal pain in the left upper quadrant, mid-abdomen, and left flank. She has had 5 episodes of clear emesis and 1 episode of 25ml of coffee-ground emesis. CT abdo/pelvis consistent with possible enteritis, GI bleed possibly secondary to this. Her hemoglobin has dropped from 11.3 to 9.0 today; possibly dilutional but will watch this closely.    PLAN:  - Fluid resuscitation to maintain BP  - EGD  - NPO after midnight  - CBC/CMP daily  - INR/PT --> calculate [id number] score  - GI recommendations  - Maintain IV access with 2 large bore Ivs  - Hold all NSAIDs and anticoagulants, unless contraindicated  - Bolus IV pantoprazole 80mg followed by 40mg BID  - Please correct any coagulopathy with platelets and FFP for goal of platelets >50K and INR <2.0  - If Hgb drops below 7g/dL, consider transfusion    Enteritis  Patient presented with left-sided abdominal pain and fever of 102.2 last night. CT showed infectious/inflammatory process. Blood pressures are soft despite fluid resuscitation. Does not meet SIRS criteria.    PLAN:  -  Started on ceftriaxone and metronidazole.  - CBC daily  - Consider stool studies if no resolution  - Continue monitoring BP    A-Fib  Patient is on Xarelto.  [id number] Score - 5 (stroke risk 7.2% per year and 10% risk of stroke)   Calculated from: Age <65, Sex F, CHF No, HTN Yes, Stroke Yes, Vascular disease Yes    PLAN:  - Discontinue Xarelto in light of new coffee-ground emesis.    Cardiac Arrest with ICD  Patient reports no issues with chest pain. Continue monitoring.    PLAN:  - Discontinue carvedilol in setting of GI bleed.  - Monitor for a-fib and CHF exacerbation    Shanda Boyle, MS4  [facility]  [place]

## 2025-01-30 NOTE — ASSESSMENT & PLAN NOTE
Patient's hemorrhage is due to gastrointestinal bleed, patient does have a propensity for bleeding due to a medication, the medication is rivaroxabam.. Patients most recent Hgb, Hct, platelets, and INR are listed below.  Recent Labs     01/29/25  1302 01/30/25  0334 01/30/25  1128   HGB 11.3* 9.0* 9.1*   HCT 33.6* 26.6* 29.0*    141* 120*     Plan  - Will trend hemoglobin/hematocrit Every 8 hours x 3  - Will monitor and correct any coagulation defects  - Will transfuse if Hgb is <7g/dl (<8g/dl in cases of active ACS) or if patient has rapid bleeding leading to hemodynamic instability  - On GI bleed pathway  - GI consulted, NPO after MN for possible EGD in AM  - Hold rivaroxban  - [id number] Score: 1; HAS-BLED 2  6 High Risk:18.2% if no warfarin  5 High Risk: 12.5% if no warfarin  4 High Risk: 8.5% if no warfarin  3 High Risk: 5.9% if no warfarin  2 Intermediate Risk: 4% if no warfarin  1 Intermediate Risk: 2.8% if no warfarin

## 2025-01-30 NOTE — ASSESSMENT & PLAN NOTE
Febrile with abrupt onset of pain to epigastrum, left hypochondrium,and left flank.    Plan:   - Serial abdominal exams  - Morphine 2 mg

## 2025-01-30 NOTE — HPI
59yoF with PMHx of AFib, cardiac arrest, pacemaker, on Xarelto, hypertension, and seizures who presented to the ED yesterday 1/29/25 c/o N/V. Patient reports she began have abdominal pain and vomiting yesterday and this morning began having profuse and persistent diarrhea as well. Patient reports her vomit this morning began appearing dark and coffee-ground like. She was holding an emesis bag while history was being obtained that contained dark green vomit. She reports her stool has been loose very loose and dark brown in color. Patient reports a fever of 102.9F on arrival. She endorses weakness but denies any SOB or dizziness. She has never had a similar episode to this in the past. She lives with her daughter and grandchildren, none of which are currently sick. Patient endorses taking ibuprofen 1-2x a week. Patient reports she takes a PPI everyday. She did not try anything for her symptoms prior to arrival, including pepto bismol.    Hb 1/30/25 9.0 down from 11.3 yesterday 1/29/25. Baseline Hgb per chart review around 10.    CT Abdomen Pelvis 1/29/25 impression:  -Findings suggesting infectious or inflammatory enteritis involving the proximal small bowel.  Correlation and follow-up is advised, no high-grade obstruction.  -Moderate hiatal hernia.    Colonoscopy 6/2/22 impression:  - One 3 mm polyp in the sigmoid colon, removed with a cold snare. Resected and retrieved. (Hyperplastic polyp.)  - Diverticulosis in the transverse colon.   - The examination was otherwise normal on direct and retroflexion views.    Upper GI Endoscopy 6/2/22 impression:  - Normal esophagus.   - Esophagogastric landmarks identified.   - A Nissen fundoplication was found. The wrap appears intact.   - Medium-sized type-II paraesophageal hernia.   - Normal gastric body and antrum. Biopsied (Antral mucosa with focal complete type intestinal metaplasia, negative for   dysplasia. Background reactive/chemical gastropathy. Negative for  malignancy.)   - Duodenal mucosal lymphangiectasia. Benign finding.     Colonoscopy 5/7/19 impression:  - Diverticulosis in the descending colon.   - One 10 mm polyp in the ascending colon, removed with a hot snare. Resected and retrieved. (Biopsy - Tubulovillous adenoma.)  - One 5 mm polyp in the transverse colon, removed with a cold snare. Resected and retrieved. (Biopsy - Tubular adenoma.)  - The examination was otherwise normal on direct and retroflexion views.     Upper GI Endoscopy 6/24/14 impression:  - Normal esophagus.   - Hiatus hernia.   - Normal stomach.   - Normal examined duodenum.     Colonoscopy 6/24/14 impression:  - There was significant looping of the colon.   - The examination was otherwise normal on direct and retroflexion views.

## 2025-01-30 NOTE — ASSESSMENT & PLAN NOTE
Tmax 102.9F in ED w/ WBC 6.96 at admission. No further fevers. Recorded T 94F appears spurious. Likely s/t infectious gastroenteritis. Drug induced enteritis s/t heavy NSAID and Crohn's disease are in the differential. Consider ischemic bowel disease less likely in setting of normal LA and no hematochezia. Pancreatitis unlikely with normal lipase.    Plan:  - Acetaminophen 1,000 mg q8 PRN  - Avoid NSAID s/t c/f GI bleeding

## 2025-01-30 NOTE — ED NOTES
Report received from RODRIGO Castano. Assume care of pt. Pt resting comfortably and independently repositioned in stretcher with bed locked in lowest position for safety. NAD noted at this time. Respirations even and unlabored and visible chest rise noted.  Patient has pure wick on hooked to suction Pt instructed to call if assistance is needed. Pt on continuous cardiac. Call light within reach. No needs at this time. Will continue to monitor.

## 2025-01-30 NOTE — PROGRESS NOTES
ED Observation Unit  Progress Note      HPI   59-year-old female, history of AFib on anticoagulation, cardiac arrest, pacemaker, hypertension, seizures, complaining of left upper quadrant abdominal pain and flank pain. Patient reports that symptoms started this morning, pain is severe and associated with nausea and vomiting but no diarrhea. She denies any URI symptoms or urinary symptoms. She took ibuprofen prior to arrival.      I reviewed the ED Provider Note dated 1/29/25 prior to my evaluation of this patient.  I reviewed all labs and imaging performed in the Main ED, prior to patient being placed in Observation. Patient was placed in the ED Observation Unit for Lower abdominal pain.    Interval History   BP's are soft this morning 90s/50s.  Patient afebrile.  Did receive Tylenol this morning.  Still complaining of left upper and lower quadrant abdominal pain.  Reports some loose stool this morning.  No complaints of bleeding or dark stool.  Hemoglobin did drop 11.3>9.0.  Will give additional fluids and analgesics.  Patient will be upgraded to Hospital Medicine for more appropriate management of abdominal pain and presumed infectious enteritis given fever of 102.9 last night.    PMHx   Past Medical History:   Diagnosis Date    Anticoagulant long-term use     Anxiety     Asthma     Atrial fibrillation     Brain anoxic injury     Cervicalgia 8/28/2014    CHI (closed head injury) 2/19/2013    Convulsion 5/30/2015    Decreased ROM of left shoulder 4/12/2017    Defibrillator activation 2013    Depression     Heart block     History of sudden cardiac arrest 2/2013    PEA arrest with subsequent long-QT    Hx of psychiatric care     Hypertension     Left atrial enlargement 4/11/2018    Pacemaker 2013    Paresthesia 11/1/2013    Prolonged Q-T interval on ECG 2/8/2013    Psychiatric problem     Seizures     Sleep difficulties     Stroke     weakness lt side    Therapy     Thyroid disease     Upper airway resistance  syndrome 2/21/2017      Past Surgical History:   Procedure Laterality Date    breast reduction      CARDIAC DEFIBRILLATOR PLACEMENT      CARDIAC DEFIBRILLATOR PLACEMENT      CARPAL TUNNEL RELEASE Right     COLONOSCOPY N/A 5/7/2019    Procedure: COLONOSCOPY;  Surgeon: Juan Coffey MD;  Location: [facility] (2ND FLR);  Service: Endoscopy;  Laterality: N/A;  per DR. Bustamante Pt to have balloon with DR. Coffey due to excesive looping, could not reach cecum - see telephone encounter 3/8/19 and last procedure report dated 6/24/14/ Per Piedad schedule as 90 min case- ERW  pacemaker/AICD Boitronik/   per , ok to hold Xareltox 2 days    COLONOSCOPY N/A 6/2/2022    Procedure: COLONOSCOPY;  Surgeon: Juan Coffey MD;  Location: [facility] (2ND FLR);  Service: Endoscopy;  Laterality: N/A;  combined orders    ESOPHAGOGASTRODUODENOSCOPY N/A 6/2/2022    Procedure: EGD (ESOPHAGOGASTRODUODENOSCOPY);  Surgeon: Juan Coffey MD;  Location: [facility] (2ND FLR);  Service: Endoscopy;  Laterality: N/A;  BMI 54; pt requests 1st available [facility]  Fully vaccinated, Hold Xarelto x 2 days per Dr. Mejia and Plavix x 5 days per Dr. Grossamn see telephone encounter 4/25/22, Biotronik PM/AICD, prep instr portal and mailed -ml    HERNIA REPAIR      HIATAL HERNIA REPAIR      INSERT / REPLACE / REMOVE PACEMAKER  10/2017    CRT-D upgrade    REVISION OF IMPLANTABLE CARDIOVERTER-DEFIBRILLATOR (ICD) ELECTRODE LEAD PLACEMENT Left 12/7/2020    Procedure: REVISION, INSERTION, ELECTRODE LEAD, ICD;  Surgeon: Avelino Mejia MD;  Location: [facility];  Service: Cardiology;  Laterality: Left;    REVISION OF SKIN POCKET FOR CARDIOVERTER-DEFIBRILLATOR  12/7/2020    Procedure: Revision, Skin Pocket, For Cardioverter-Defibrillator;  Surgeon: Avelino Mejia MD;  Location: [facility] EP LAB;  Service: Cardiology;;    TOTAL REDUCTION MAMMOPLASTY      TRANSESOPHAGEAL ECHOCARDIOGRAPHY N/A 12/7/2020    Procedure: ECHOCARDIOGRAM, TRANSESOPHAGEAL;  Surgeon: Ivory JETER  MD Maxine;  Location: [facility] EP LAB;  Service: Cardiology;  Laterality: N/A;    TRANSESOPHAGEAL ECHOCARDIOGRAPHY N/A 12/7/2020    Procedure: ECHOCARDIOGRAM, TRANSESOPHAGEAL;  Surgeon: Patrick Diagnostic Provider;  Location: [facility] OR 2ND FLR;  Service: Cardiology;  Laterality: N/A;    TRIGGER FINGER RELEASE Left 8/2/2019    Procedure: RELEASE, TRIGGER FINGER left middle;  Surgeon: Matt Pugh Jr., MD;  Location: [facility] OR;  Service: Plastics;  Laterality: Left;    TRIGGER FINGER RELEASE Right 2/11/2020    Procedure: RELEASE, TRIGGER FINGER middle;  Surgeon: Matt Pugh Jr., MD;  Location: [facility] OR;  Service: Plastics;  Laterality: Right;    TUBAL LIGATION          Family Hx   Family History   Problem Relation Name Age of Onset    COPD Mother      Hypertension Mother      Hypertension Father      Asthma Daughter Donna     Asthma Daughter Lauryn     Glaucoma Maternal Grandmother      Glaucoma Paternal Grandmother      COPD Other      Heart failure Other          Social Hx   Social History     Socioeconomic History    Marital status: Single   Occupational History     Employer: [employer]   Tobacco Use    Smoking status: Never     Passive exposure: Never    Smokeless tobacco: Never   Substance and Sexual Activity    Alcohol use: Not Currently    Drug use: No    Sexual activity: Not Currently   Social History Narrative    Now on disability     Social Drivers of Health     Financial Resource Strain: Low Risk  (1/16/2025)    Overall Financial Resource Strain (CARDIA)     Difficulty of Paying Living Expenses: Not hard at all   Food Insecurity: No Food Insecurity (1/16/2025)    Hunger Vital Sign     Worried About Running Out of Food in the Last Year: Never true     Ran Out of Food in the Last Year: Never true   Transportation Needs: No Transportation Needs (1/16/2025)    PRAPARE - Transportation     Lack of Transportation (Medical): No     Lack of Transportation (Non-Medical): No   Physical Activity:  Insufficiently Active (1/16/2025)    Exercise Vital Sign     Days of Exercise per Week: 7 days     Minutes of Exercise per Session: 20 min   Stress: Stress Concern Present (1/16/2025)    [employer] - Occupational Stress Questionnaire     Feeling of Stress : To some extent   Housing Stability: Low Risk  (1/16/2025)    Housing Stability Vital Sign     Unable to Pay for Housing in the Last Year: No     Homeless in the Last Year: No        Vital Signs   Vitals:    01/30/25 0243 01/30/25 0541 01/30/25 0832 01/30/25 0836   BP: (!) 103/52 (!) 100/49 (!) 94/45 (!) 96/54   BP Location: Left arm Left arm Right forearm Right arm   Patient Position: Lying Lying Lying Sitting   Pulse: 71 78 70 71   Resp: 17 17 19    Temp:  98.9 °F (37.2 °C) 98.6 °F (37 °C)    TempSrc:  Oral Oral    SpO2: 97% 95% (!) 92% (!) 94%   Weight:       Height:            Review of Systems  Review of Systems   Gastrointestinal:  Positive for abdominal pain and diarrhea (loose). Negative for nausea and vomiting.       Brief Physical Exam/Reassessment   Physical Exam  Vitals reviewed.   Constitutional:       General: She is not in acute distress.     Appearance: She is obese. She is not diaphoretic.   HENT:      Head: Normocephalic and atraumatic.   Cardiovascular:      Rate and Rhythm: Normal rate and regular rhythm.      Heart sounds: Heart sounds not distant. No murmur heard.     No friction rub. No gallop.   Pulmonary:      Effort: Pulmonary effort is normal. No respiratory distress.      Breath sounds: Normal breath sounds. No decreased breath sounds, wheezing, rhonchi or rales.   Abdominal:      Palpations: Abdomen is soft. There is no mass.      Tenderness: There is abdominal tenderness in the left upper quadrant and left lower quadrant. There is no guarding.   Musculoskeletal:      Cervical back: Neck supple.   Skin:     General: Skin is warm and dry.   Neurological:      Mental Status: She is alert.   Psychiatric:          Mood and Affect: Mood and affect normal.         Labs/Imaging   Labs Reviewed   CBC W/ AUTO DIFFERENTIAL - Abnormal       Result Value    WBC 6.96      RBC 3.64 (*)     Hemoglobin 11.3 (*)     Hematocrit 33.6 (*)     MCV 92      MCH 31.0      MCHC 33.6      RDW 16.2 (*)     Platelets 189      MPV 12.4      Immature Granulocytes 0.4      Gran # (ANC) 6.0      Immature Grans (Abs) 0.03      Lymph # 0.6 (*)     Mono # 0.4      Eos # 0.0      Baso # 0.00      nRBC 0      Gran % 85.6 (*)     Lymph % 8.5 (*)     Mono % 5.5      Eosinophil % 0.0      Basophil % 0.0      Differential Method Automated     COMPREHENSIVE METABOLIC PANEL - Abnormal    Sodium 134 (*)     Potassium 3.4 (*)     Chloride 105      CO2 23      Glucose 111 (*)     BUN 12      Creatinine 0.8      Calcium 8.6 (*)     Total Protein 10.8 (*)     Albumin 2.9 (*)     Total Bilirubin 0.7      Alkaline Phosphatase 66      AST 14      ALT 15      eGFR >60.0      Anion Gap 6 (*)    URINALYSIS, REFLEX TO URINE CULTURE - Abnormal    Specimen UA Urine, Catheterized      Color, UA Yellow      Appearance, UA Clear      pH, UA 6.0      Specific Gravity, UA >1.030 (*)     Protein, UA 1+ (*)     Glucose, UA Negative      Ketones, UA Negative      Bilirubin (UA) Negative      Occult Blood UA Negative      Nitrite, UA Negative      Leukocytes, UA Negative      Narrative:     Specimen Source->Urine   URINALYSIS MICROSCOPIC - Abnormal    RBC, UA 4      WBC, UA 7 (*)     Bacteria Rare      Squam Epithel, UA 1      Hyaline Casts, UA 0      Microscopic Comment SEE COMMENT      Narrative:     Specimen Source->Urine   BASIC METABOLIC PANEL - Abnormal    Sodium 135 (*)     Potassium 3.4 (*)     Chloride 110      CO2 18 (*)     Glucose 101      BUN 20      Creatinine 0.9      Calcium 7.7 (*)     Anion Gap 7 (*)     eGFR >60.0     MAGNESIUM - Abnormal    Magnesium 1.5 (*)    CBC W/ AUTO DIFFERENTIAL - Abnormal    WBC 8.88      RBC 2.87 (*)     Hemoglobin 9.0 (*)      Hematocrit 26.6 (*)     MCV 93      MCH 31.4 (*)     MCHC 33.8      RDW 16.4 (*)     Platelets 141 (*)     MPV 12.4      Immature Granulocytes 0.5      Gran # (ANC) 7.0      Immature Grans (Abs) 0.04      Lymph # 1.2      Mono # 0.7      Eos # 0.0      Baso # 0.01      nRBC 0      Gran % 79.0 (*)     Lymph % 13.1 (*)     Mono % 7.3      Eosinophil % 0.0      Basophil % 0.1      Differential Method Automated     CULTURE, BLOOD    Blood Culture, Routine No Growth to date      Narrative:     Aerobic and anaerobic   CULTURE, BLOOD    Blood Culture, Routine No Growth to date      Narrative:     Aerobic and anaerobic   INFLUENZA A & B BY MOLECULAR    Influenza A, Molecular Negative      Influenza B, Molecular Negative      Flu A & B Source NP     SARS-COV-2 RNA AMPLIFICATION, QUAL    SARS-CoV-2 RNA, Amplification, Qual Negative     LIPASE    Lipase 11     B-TYPE NATRIURETIC PEPTIDE   TROPONIN I HIGH SENSITIVITY    Troponin I High Sensitivity 6     LACTIC ACID, PLASMA      Imaging Results               CT Abdomen Pelvis With IV Contrast NO Oral Contrast (Final result)  Result time 01/29/25 15:26:35      Final result by James Rosales MD (01/29/25 15:26:35)                   Impression:      This report was flagged in Epic as abnormal.    1. Findings suggesting infectious or inflammatory enteritis involving the proximal small bowel.  Correlation and follow-up is advised, no high-grade obstruction.  2. There is hepatomegaly noting possible steatosis, correlation with LFTs recommended.  3. Cholelithiasis, no secondary findings to suggest acute cholecystitis.  4. Moderate hiatal hernia.  5. Please see above for additional findings.      Electronically signed by: James Rosales MD  Date:    01/29/2025  Time:    15:26               Narrative:    EXAMINATION:  CT ABDOMEN PELVIS WITH IV CONTRAST    CLINICAL HISTORY:  Abdominal pain, acute, nonlocalized;    TECHNIQUE:  Low dose axial images, sagittal and coronal reformations  were obtained from the lung bases to the pubic symphysis following the IV administration of 100 mL of Omnipaque 350 .  Oral contrast was not given.    COMPARISON:  CT chest abdomen and pelvis 09/22/2023    FINDINGS:  Images of the lower thorax are remarkable for bilateral dependent atelectasis/scarring.  Pacer wires noted.  There is a moderate hiatal hernia.  Liquid content within the distal esophagus suggests sequela gastroesophageal reflux.    The liver is mildly hypoattenuating, possibly reflecting steatosis, correlation with LFTs recommended.  The liver is enlarged.  The spleen, pancreas and adrenal glands are grossly unremarkable.  There is cholelithiasis/sludge, no secondary findings to suggest acute cholecystitis.  The stomach is nondistended, no gastric wall thickening.  The portal vein, splenic vein, SMV, celiac axis and SMA all are patent.  No significant abdominal lymphadenopathy.    The kidneys enhance symmetrically without hydronephrosis or nephrolithiasis.  A subcentimeter low attenuating lesion arises from the interpolar region of the right kidney.  The bilateral ureters are unremarkable without calculi seen noting the ureters cannot be followed in their entirety to the urinary bladder.  No secondary findings to suggest obstructive uropathy.  The urinary bladder is unremarkable.  The uterus is unremarkable.  The adnexa is unremarkable.    There are a few scattered colonic diverticula without inflammation to suggest diverticulitis.  The terminal ileum is unremarkable.  The appendix or appendiceal stump is unremarkable.  There are several thickened loops of proximal jejunum noting surrounding inflammation and vascular engorgement of the mesentery.  Distally, the small bowel returns to normal caliber.  No pneumatosis or free air.  There are a few scattered shotty periaortic, pericaval, and mesenteric lymph nodes.  No focal organized pelvic fluid collection.    There is osteopenia.  There are degenerative  changes of the spine.  No significant inguinal lymphadenopathy.                                       X-Ray Chest AP Portable (Final result)  Result time 01/29/25 13:40:39      Final result by James Lawson MD (01/29/25 13:40:39)                   Impression:      Cardiac defibrillator.    No acute chest disease identified.      Electronically signed by: James Lawson MD  Date:    01/29/2025  Time:    13:40               Narrative:    EXAMINATION:  XR CHEST AP PORTABLE    CLINICAL HISTORY:  Sepsis;    TECHNIQUE:  Single frontal view of the chest was performed.    COMPARISON:  11/07/2024.    FINDINGS:  There is a cardiac defibrillator in place as before.  The heart and mediastinal structures are within normal limits in size and unchanged.  Pulmonary vasculature is unremarkable.  The lungs are well aerated and free of focal consolidations.  There is no evidence for pneumothorax or large pleural effusions.  Bony structures are grossly intact.                                       I reviewed all labs, imaging, EKGs.     Plan   L-sided abdominal pain  Enteritis  Fever     - t max 102.9, currently AFVSS on RA  - no leukocytosis or significant electrolyte abnormalities, LFT wnl, lipase wnl  - UA noninfectious  - CXR unremarkable  - CT a/p: Findings suggesting infectious or inflammatory enteritis involving the proximal small bowel. There is hepatomegaly noting possible steatosis, correlation with LFTs recommended. Cholelithiasis, no secondary findings to suggest acute cholecystitis. Moderate hiatal hernia.  - CLD  - prn antiemetics  - scheduled tylenol, prn toradol  - flagyl and ctx (no cipro d/t prolonged qt)  - continue monitoring, daily labs, follow bcx   - Upgrade to Hosp medicine    I have discussed this case with MARIO Martinez.

## 2025-01-30 NOTE — PLAN OF CARE
Inpatient Upgrade Note    Amna Chawla has warranted treatment spanning two or more midnights of hospital level care for the management of  inflammatory vs infectious enteritis . She continues to require IV antibiotics, daily labs, monitoring of vital signs, advancing diet, and further evaluation by consultants. Her condition is also complicated by the following comorbidities: Coronary Artery Disease, Obesity, and cardiomyopathy and A Fib .       Ines Villalobos M.D.  Department of Hospital Medicine  [facility]

## 2025-01-30 NOTE — H&P
"[facility] - Emergency Dept  [facility]  History & Physical    Patient Name: Amna Chawla  MRN: [id number]  Patient Class: IP- Inpatient  Admission Date: 1/29/2025  Attending Physician: Ines Villalobos MD   Primary Care Provider: Tiffany Mina MD         Patient information was obtained from patient and ER records.     Subjective:     Principal Problem:Lower abdominal pain    Chief Complaint:   Chief Complaint   Patient presents with    Flank Pain     Pt reports left flank pain,  "I don't know if my pacemaker came loose"; +vomiting since this am        HPI: Amna Chawla is a 59 y.o. F w/ a PMH s/f of cardiac arrest (s/p AICD Feb 2013), stroke with anoxic brain injury, a-fib, CRT, migraine, and hypertension who presents with left upper quadrant pain and left flank pain. She reports the pain began yesterday morning around 530am. She reports this pain is unlike anything she has experienced before. The pain has not been controlled with the NSADs takes at home.  She vomited "clear liquid" two times before she arrived in the ED, twice in the ED, and once this morning. Her most recent episode of emesis was coffee-ground of approximately 25 ml. She endorses diarrhea that began this morning. She also endorses cough, headache, and heartburn.    In the ED VS notable for a triage T 102.9 and hypotension 72/37 at one point. Labs remarkable for Hgb drop from 11 -> 9 and K 3.4. Imaging remarkable for findings suggesting infectious or inflammatory enteritis involving the proximal small bowel.    Admit to [facility]     Past Medical History:   Diagnosis Date    Anticoagulant long-term use     Anxiety     Asthma     Atrial fibrillation     Brain anoxic injury     Cervicalgia 8/28/2014    CHI (closed head injury) 2/19/2013    Convulsion 5/30/2015    Decreased ROM of left shoulder 4/12/2017    Defibrillator activation 2013    Depression     Heart block     History of sudden cardiac arrest 2/2013    PEA arrest with " subsequent long-QT    Hx of psychiatric care     Hypertension     Left atrial enlargement 4/11/2018    Pacemaker 2013    Paresthesia 11/1/2013    Prolonged Q-T interval on ECG 2/8/2013    Psychiatric problem     Seizures     Sleep difficulties     Stroke     weakness lt side    Therapy     Thyroid disease     Upper airway resistance syndrome 2/21/2017       Past Surgical History:   Procedure Laterality Date    breast reduction      CARDIAC DEFIBRILLATOR PLACEMENT      CARDIAC DEFIBRILLATOR PLACEMENT      CARPAL TUNNEL RELEASE Right     COLONOSCOPY N/A 5/7/2019    Procedure: COLONOSCOPY;  Surgeon: Juan Coffey MD;  Location: [facility] (2ND FLR);  Service: Endoscopy;  Laterality: N/A;  per DR. Bustamante Pt to have balloon with DR. Coffey due to excesive looping, could not reach cecum - see telephone encounter 3/8/19 and last procedure report dated 6/24/14/ Per Piedad schedule as 90 min case- ERW  pacemaker/AICD Boitronik/   per , ok to hold Xareltox 2 days    COLONOSCOPY N/A 6/2/2022    Procedure: COLONOSCOPY;  Surgeon: Juan Coffey MD;  Location: [facility] (2ND FLR);  Service: Endoscopy;  Laterality: N/A;  combined orders    ESOPHAGOGASTRODUODENOSCOPY N/A 6/2/2022    Procedure: EGD (ESOPHAGOGASTRODUODENOSCOPY);  Surgeon: Juan Coffey MD;  Location: [facility] (2ND FLR);  Service: Endoscopy;  Laterality: N/A;  BMI 54; pt requests [facility]st available [facility]  Fully vaccinated, Hold Xarelto x 2 days per Dr. Mejia and Plavix x 5 days per Dr. Grossman see telephone encounter 4/25/22, Biotronik PM/AICD, prep instr portal and mailed -ml    HERNIA REPAIR      HIATAL HERNIA REPAIR      INSERT / REPLACE / REMOVE PACEMAKER  10/2017    CRT-D upgrade    REVISION OF IMPLANTABLE CARDIOVERTER-DEFIBRILLATOR (ICD) ELECTRODE LEAD PLACEMENT Left 12/7/2020    Procedure: REVISION, INSERTION, ELECTRODE LEAD, ICD;  Surgeon: Avelino Mejia MD;  Location: [facility] EP LAB;  Service: Cardiology;  Laterality: Left;    REVISION OF SKIN POCKET FOR  CARDIOVERTER-DEFIBRILLATOR  12/7/2020    Procedure: Revision, Skin Pocket, For Cardioverter-Defibrillator;  Surgeon: Avelino Mejia MD;  Location: [facility] EP LAB;  Service: Cardiology;;    TOTAL REDUCTION MAMMOPLASTY      TRANSESOPHAGEAL ECHOCARDIOGRAPHY N/A 12/7/2020    Procedure: ECHOCARDIOGRAM, TRANSESOPHAGEAL;  Surgeon: Ivory Goodman MD;  Location: [facility] EP LAB;  Service: Cardiology;  Laterality: N/A;    TRANSESOPHAGEAL ECHOCARDIOGRAPHY N/A 12/7/2020    Procedure: ECHOCARDIOGRAM, TRANSESOPHAGEAL;  Surgeon: Patrick Diagnostic Provider;  Location: [facility] OR 2ND FLR;  Service: Cardiology;  Laterality: N/A;    TRIGGER FINGER RELEASE Left 8/2/2019    Procedure: RELEASE, TRIGGER FINGER left middle;  Surgeon: Matt Pugh Jr., MD;  Location: [facility];  Service: Plastics;  Laterality: Left;    TRIGGER FINGER RELEASE Right 2/11/2020    Procedure: RELEASE, TRIGGER FINGER middle;  Surgeon: Matt Pugh Jr., MD;  Location: [facility];  Service: Plastics;  Laterality: Right;    TUBAL LIGATION         Review of patient's allergies indicates:   Allergen Reactions    Aspirin Hives    Imitrex [sumatriptan] Palpitations    Penicillins Hives and Swelling    Shellfish containing products Anaphylaxis     seafood    Reglan [metoclopramide hcl] Other (See Comments)     Parkinsonism        Current Facility-Administered Medications on File Prior to Encounter   Medication    cyanocobalamin tablet 100 mcg    lactated ringers infusion    lidocaine (PF) 10 mg/ml (1%) injection 5 mg    onabotulinumtoxina injection 200 Units    onabotulinumtoxina injection 200 Units    onabotulinumtoxina injection 200 Units    onabotulinumtoxina injection 200 Units    onabotulinumtoxina injection 200 Units    onabotulinumtoxina injection 200 Units    onabotulinumtoxina injection 200 Units    onabotulinumtoxina injection 200 Units     Current Outpatient Medications on File Prior to Encounter   Medication Sig    albuterol (VENTOLIN HFA) 90 mcg/actuation  inhaler Inhale 2 puffs into the lungs every 6 (six) hours as needed for Wheezing. Rescue    amLODIPine (NORVASC) 5 MG tablet Take 1 tablet (5 mg total) by mouth once daily.    ARIPiprazole (ABILIFY) 15 MG Tab Take 1 tablet (15 mg total) by mouth once daily.    carvediloL (COREG) 25 MG tablet Take 2 tablets (50 mg total) by mouth 2 (two) times daily with meals.    cetirizine (ZYRTEC) 10 MG tablet Take 1 tablet (10 mg total) by mouth once daily. for 7 days    clonazePAM (KLONOPIN) 1 MG tablet TAKE 1 TABLET BY MOUTH DAILY AS NEEDED FOR ANXIETY    cyanocobalamin, vitamin B-12, 1,000 mcg Subl Place 1,000 mcg under the tongue once daily.    donepezil (ARICEPT) 10 MG tablet Take 1 tablet (10 mg total) by mouth 2 (two) times daily.    EPINEPHrine (EPIPEN) 0.3 mg/0.3 mL AtIn Inject 0.3 mLs (0.3 mg total) into the muscle once. for 1 dose    flurbiprofen (ANSAID) 100 MG tablet Take 1 tablet (100 mg total) by mouth 2 (two) times daily as needed (migraine).    fluticasone propionate (FLONASE) 50 mcg/actuation nasal spray 1 spray (50 mcg total) by Each Nostril route once daily.    fremanezumab-vfrm (AJOVY) 225 mg/1.5 mL injection Inject 1.5 mLs (225 mg total) into the skin every 28 days.    furosemide (LASIX) 20 MG tablet Take 1 tablet (20 mg total) by mouth daily as needed. Take as needed for weight gain (2-3 lbs/day or 5 lbs/week), shortness of breath, or leg swelling    gabapentin (NEURONTIN) 300 MG capsule Take 1 capsule (300 mg total) by mouth 3 (three) times daily.    losartan (COZAAR) 100 MG tablet Take 1 tablet (100 mg total) by mouth once daily.    magnesium 200 mg Tab Take 200 mg by mouth once daily.    mupirocin (BACTROBAN) 2 % ointment Apply topically 3 (three) times daily.    omeprazole (PRILOSEC) 40 MG capsule Take 1 capsule (40 mg total) by mouth once daily. Take 30 minutes before breakfast    ondansetron (ZOFRAN-ODT) 4 MG TbDL DISSOLVE 1 TABLET(4 MG) ON THE TONGUE EVERY 12 HOURS AS NEEDED FOR NAUSEA     polyethylene glycol (GLYCOLAX) 17 gram PwPk Take 17 g by mouth once daily.    rivaroxaban (XARELTO) 20 mg Tab Take 1 tablet (20 mg total) by mouth daily with dinner or evening meal.    rosuvastatin (CRESTOR) 40 MG Tab Take 1 tablet (40 mg total) by mouth every evening.    sertraline (ZOLOFT) 100 MG tablet Take 2 tablets (200 mg total) by mouth once daily.    tiaGABine (GABITRIL) 4 MG tablet Take 1 tablet (4 mg total) by mouth every evening.    tiZANidine (ZANAFLEX) 4 MG tablet Take 1 tablet (4 mg total) by mouth 3 (three) times daily as needed (muscle pain).    tiZANidine (ZANAFLEX) 4 MG tablet Take 1 tablet (4 mg total) by mouth 3 (three) times daily as needed (muscle pain).    ubrogepant (UBROGEPANT) 100 mg tablet Take 1 tablet (100 mg total) by mouth 2 (two) times daily as needed for Migraine.    zolpidem (AMBIEN) 10 mg Tab Take 1 tablet (10 mg total) by mouth nightly as needed (insomnia).    [DISCONTINUED] metFORMIN (GLUCOPHAGE) 500 MG tablet Take 1 tablet (500 mg total) by mouth 2 (two) times daily with meals. (Patient not taking: Reported on 1/14/2025)     Family History       Problem Relation (Age of Onset)    Asthma Daughter, Daughter    COPD Mother, Other    Glaucoma Maternal Grandmother, Paternal Grandmother    Heart failure Other    Hypertension Mother, Father          Tobacco Use    Smoking status: Never     Passive exposure: Never    Smokeless tobacco: Never   Substance and Sexual Activity    Alcohol use: Not Currently    Drug use: No    Sexual activity: Not Currently     Review of Systems   Constitutional:  Positive for chills and fever.   Respiratory:  Negative for cough, choking and shortness of breath.    Cardiovascular:  Negative for palpitations and leg swelling.   Gastrointestinal:  Positive for abdominal pain, diarrhea, nausea and vomiting. Negative for abdominal distention and blood in stool.   Genitourinary:  Negative for difficulty urinating, dysuria and hematuria.   Neurological:  Negative  for dizziness and facial asymmetry.     Objective:     Vital Signs (Most Recent):  Temp: 98.6 °F (37 °C) (01/30/25 0832)  Pulse: 71 (01/30/25 0836)  Resp: 19 (01/30/25 0832)  BP: (!) 96/54 (01/30/25 0836)  SpO2: (!) 94 % (01/30/25 0836) Vital Signs (24h Range):  Temp:  [98.4 °F (36.9 °C)-102.9 °F (39.4 °C)] 98.6 °F (37 °C)  Pulse:  [58-96] 71  Resp:  [16-22] 19  SpO2:  [92 %-98 %] 94 %  BP: ()/(37-81) 96/54     Weight: 131.1 kg (289 lb)  Body mass index is 49.61 kg/m².     Physical Exam  Constitutional:       General: She is not in acute distress.     Appearance: She is ill-appearing. She is not toxic-appearing or diaphoretic.   HENT:      Head: Normocephalic and atraumatic.      Mouth/Throat:      Mouth: Mucous membranes are dry.   Eyes:      General: No scleral icterus.     Extraocular Movements: Extraocular movements intact.   Cardiovascular:      Rate and Rhythm: Normal rate and regular rhythm.      Heart sounds: No murmur heard.     No gallop.   Pulmonary:      Effort: Pulmonary effort is normal.      Breath sounds: Normal breath sounds.   Abdominal:      General: There is no distension.      Palpations: Abdomen is soft.      Tenderness: There is abdominal tenderness. There is no guarding.   Skin:     General: Skin is warm and dry.   Neurological:      Mental Status: She is alert and oriented to person, place, and time.   Psychiatric:         Behavior: Behavior normal.                Significant Labs: All pertinent labs within the past 24 hours have been reviewed.  CBC:   Recent Labs   Lab 01/29/25  1302 01/30/25  0334   WBC 6.96 8.88   HGB 11.3* 9.0*   HCT 33.6* 26.6*    141*     CMP:   Recent Labs   Lab 01/29/25  1302 01/30/25  0334   * 135*   K 3.4* 3.4*    110   CO2 23 18*   * 101   BUN 12 20   CREATININE 0.8 0.9   CALCIUM 8.6* 7.7*   PROT 10.8*  --    ALBUMIN 2.9*  --    BILITOT 0.7  --    ALKPHOS 66  --    AST 14  --    ALT 15  --    ANIONGAP 6* 7*     Lipase:   Recent Labs    Lab 01/29/25  1420   LIPASE 11     Troponin:   Recent Labs   Lab 01/30/25  0155   TROPONINIHS 6       Significant Imaging:  CT Abdomen Pelvis With IV Contrast NO Oral Contrast   Final Result   Abnormal      This report was flagged in Epic as abnormal.      1. Findings suggesting infectious or inflammatory enteritis involving the proximal small bowel.  Correlation and follow-up is advised, no high-grade obstruction.   2. There is hepatomegaly noting possible steatosis, correlation with LFTs recommended.   3. Cholelithiasis, no secondary findings to suggest acute cholecystitis.   4. Moderate hiatal hernia.   5. Please see above for additional findings.         Electronically signed by: James Rosales MD   Date:    01/29/2025   Time:    15:26      X-Ray Chest AP Portable   Final Result      Cardiac defibrillator.      No acute chest disease identified.         Electronically signed by: James Lawson MD   Date:    01/29/2025   Time:    13:40          Assessment/Plan:     * Lower abdominal pain  Febrile with abrupt onset of pain to epigastrum, left hypochondrium,and left flank.    Plan:   - Serial abdominal exams  - Morphine 2 mg       Gastroenteritis  60 yo F complaining of left upper quadrant abdominal pain and flank pain. Patient reports that symptoms started on 1/29, pain is severe and associated with nausea and vomiting. Initially no diarrhea, but now having loose bowel movements. Febrile to 102.9 and hypotensive to 72/37 in ED. Imaging suggesting infectious or inflammatory enteritis involving the proximal small bowel.    Plan:  - Admit to [facility]  - Continue ceftriaxone and flagyl  - Stool culture  - Fecal calprotectin  - Follow blood cultures         Coffee ground emesis  Patient's hemorrhage is due to gastrointestinal bleed, patient does have a propensity for bleeding due to a medication, the medication is rivaroxabam.. Patients most recent Hgb, Hct, platelets, and INR are listed below.  Recent Labs      01/29/25  1302 01/30/25  0334 01/30/25  1128   HGB 11.3* 9.0* 9.1*   HCT 33.6* 26.6* 29.0*    141* 120*     Plan  - Will trend hemoglobin/hematocrit Every 8 hours x 3  - Will monitor and correct any coagulation defects  - Will transfuse if Hgb is <7g/dl (<8g/dl in cases of active ACS) or if patient has rapid bleeding leading to hemodynamic instability  - On GI bleed pathway  - GI consulted, NPO after MN for possible EGD in AM  - Hold rivaroxban  - [id number] Score: 1; HAS-BLED 2  6 High Risk:18.2% if no warfarin  5 High Risk: 12.5% if no warfarin  4 High Risk: 8.5% if no warfarin  3 High Risk: 5.9% if no warfarin  2 Intermediate Risk: 4% if no warfarin  1 Intermediate Risk: 2.8% if no warfarin                    Hypotension  Hypotensive to 72/37. Resuscitated 4L crystalloid. BP now 179/114 but pt c/o 9/10 pain.    Plan:  - Hold home carvedilol, losartan, and amlodipine  - Add back as clinically indicated      Fever  Tmax 102.9F in ED w/ WBC 6.96 at admission. No further fevers. Recorded T 94F appears spurious. Likely s/t infectious gastroenteritis. Drug induced enteritis s/t heavy NSAID and Crohn's disease are in the differential. Consider ischemic bowel disease less likely in setting of normal LA and no hematochezia. Pancreatitis unlikely with normal lipase.    Plan:  - Acetaminophen 1,000 mg q8 PRN  - Avoid NSAID s/t c/f GI bleeding         VTE Risk Mitigation (From admission, onward)           Ordered     Reason for No Pharmacological VTE Prophylaxis  Once        Question:  Reasons:  Answer:  Already adequately anticoagulated on oral Anticoagulants    01/29/25 1941     IP VTE HIGH RISK PATIENT  Once         01/29/25 1941     Place sequential compression device  Until discontinued         01/29/25 1941                               ED Observation Unit  Progress Note      HPI   59-year-old female, history of AFib on anticoagulation, cardiac arrest, pacemaker, hypertension, seizures, complaining of left upper  quadrant abdominal pain and flank pain. Patient reports that symptoms started this morning, pain is severe and associated with nausea and vomiting but no diarrhea. She denies any URI symptoms or urinary symptoms. She took ibuprofen prior to arrival.      I reviewed the ED Provider Note dated 1/29/25 prior to my evaluation of this patient.  I reviewed all labs and imaging performed in the Main ED, prior to patient being placed in Observation. Patient was placed in the ED Observation Unit for Lower abdominal pain.    Interval History   BP's are soft this morning 90s/50s.  Patient afebrile.  Did receive Tylenol this morning.  Still complaining of left upper and lower quadrant abdominal pain.  Reports some loose stool this morning.  No complaints of bleeding or dark stool.  Hemoglobin did drop 11.3>9.0.  Will give additional fluids and analgesics.  Patient will be upgraded to Hospital Medicine for more appropriate management of abdominal pain and presumed infectious enteritis given fever of 102.9 last night.    PMHx   Past Medical History:   Diagnosis Date    Anticoagulant long-term use     Anxiety     Asthma     Atrial fibrillation     Brain anoxic injury     Cervicalgia 8/28/2014    CHI (closed head injury) 2/19/2013    Convulsion 5/30/2015    Decreased ROM of left shoulder 4/12/2017    Defibrillator activation 2013    Depression     Heart block     History of sudden cardiac arrest 2/2013    PEA arrest with subsequent long-QT    Hx of psychiatric care     Hypertension     Left atrial enlargement 4/11/2018    Pacemaker 2013    Paresthesia 11/1/2013    Prolonged Q-T interval on ECG 2/8/2013    Psychiatric problem     Seizures     Sleep difficulties     Stroke     weakness lt side    Therapy     Thyroid disease     Upper airway resistance syndrome 2/21/2017      Past Surgical History:   Procedure Laterality Date    breast reduction      CARDIAC DEFIBRILLATOR PLACEMENT      CARDIAC DEFIBRILLATOR PLACEMENT      CARPAL  TUNNEL RELEASE Right     COLONOSCOPY N/A 5/7/2019    Procedure: COLONOSCOPY;  Surgeon: Juan Coffey MD;  Location: [facility] ENDO (2ND FLR);  Service: Endoscopy;  Laterality: N/A;  per DR. Bustamante Pt to have balloon with DR. Coffey due to excesive looping, could not reach cecum - see telephone encounter 3/8/19 and last procedure report dated 6/24/14/ Per Piedad schedule as 90 min case- ERW  pacemaker/AICD Boitronik/   per , ok to hold Xareltox 2 days    COLONOSCOPY N/A 6/2/2022    Procedure: COLONOSCOPY;  Surgeon: Juan Coffey MD;  Location: [facility] ENDO (2ND FLR);  Service: Endoscopy;  Laterality: N/A;  combined orders    ESOPHAGOGASTRODUODENOSCOPY N/A 6/2/2022    Procedure: EGD (ESOPHAGOGASTRODUODENOSCOPY);  Surgeon: Juan Coffey MD;  Location: [facility] ENDO (2ND FLR);  Service: Endoscopy;  Laterality: N/A;  BMI 54; pt requests [facility]st available [facility]  Fully vaccinated, Hold Xarelto x 2 days per Dr. Mejia and Plavix x 5 days per Dr. Grossman see telephone encounter 4/25/22, Biotronik PM/AICD, prep instr portal and mailed -ml    HERNIA REPAIR      HIATAL HERNIA REPAIR      INSERT / REPLACE / REMOVE PACEMAKER  10/2017    CRT-D upgrade    REVISION OF IMPLANTABLE CARDIOVERTER-DEFIBRILLATOR (ICD) ELECTRODE LEAD PLACEMENT Left 12/7/2020    Procedure: REVISION, INSERTION, ELECTRODE LEAD, ICD;  Surgeon: Avelino Mejia MD;  Location: [facility] EP LAB;  Service: Cardiology;  Laterality: Left;    REVISION OF SKIN POCKET FOR CARDIOVERTER-DEFIBRILLATOR  12/7/2020    Procedure: Revision, Skin Pocket, For Cardioverter-Defibrillator;  Surgeon: Avelino Mejia MD;  Location: [facility] EP LAB;  Service: Cardiology;;    TOTAL REDUCTION MAMMOPLASTY      TRANSESOPHAGEAL ECHOCARDIOGRAPHY N/A 12/7/2020    Procedure: ECHOCARDIOGRAM, TRANSESOPHAGEAL;  Surgeon: Ivory Goodman MD;  Location: [facility] EP LAB;  Service: Cardiology;  Laterality: N/A;    TRANSESOPHAGEAL ECHOCARDIOGRAPHY N/A 12/7/2020    Procedure: ECHOCARDIOGRAM, TRANSESOPHAGEAL;  Surgeon:  [facility] Diagnostic Provider;  Location: [address] FLR;  Service: Cardiology;  Laterality: N/A;    TRIGGER FINGER RELEASE Left 8/2/2019    Procedure: RELEASE, TRIGGER FINGER left middle;  Surgeon: Matt Pugh Jr., MD;  Location: [facility];  Service: Plastics;  Laterality: Left;    TRIGGER FINGER RELEASE Right 2/11/2020    Procedure: RELEASE, TRIGGER FINGER middle;  Surgeon: Matt Pugh Jr., MD;  Location: [facility];  Service: Plastics;  Laterality: Right;    TUBAL LIGATION          Family Hx   Family History   Problem Relation Name Age of Onset    COPD Mother      Hypertension Mother      Hypertension Father      Asthma Daughter Donna     Asthma Daughter Lauryn     Glaucoma Maternal Grandmother      Glaucoma Paternal Grandmother      COPD Other      Heart failure Other          Social Hx   Social History     Socioeconomic History    Marital status: Single   Occupational History     Employer: [employer]   Tobacco Use    Smoking status: Never     Passive exposure: Never    Smokeless tobacco: Never   Substance and Sexual Activity    Alcohol use: Not Currently    Drug use: No    Sexual activity: Not Currently   Social History Narrative    Now on disability     Social Drivers of Health     Financial Resource Strain: Low Risk  (1/16/2025)    Overall Financial Resource Strain (CARDIA)     Difficulty of Paying Living Expenses: Not hard at all   Food Insecurity: No Food Insecurity (1/16/2025)    Hunger Vital Sign     Worried About Running Out of Food in the Last Year: Never true     Ran Out of Food in the Last Year: Never true   Transportation Needs: No Transportation Needs (1/16/2025)    PRAPARE - Transportation     Lack of Transportation (Medical): No     Lack of Transportation (Non-Medical): No   Physical Activity: Insufficiently Active (1/16/2025)    Exercise Vital Sign     Days of Exercise per Week: 7 days     Minutes of Exercise per Session: 20 min   Stress: Stress Concern Present (1/16/2025)     [employer] - Occupational Stress Questionnaire     Feeling of Stress : To some extent   Housing Stability: Low Risk  (1/16/2025)    Housing Stability Vital Sign     Unable to Pay for Housing in the Last Year: No     Homeless in the Last Year: No        Vital Signs   Vitals:    01/30/25 0243 01/30/25 0541 01/30/25 0832 01/30/25 0836   BP: (!) 103/52 (!) 100/49 (!) 94/45 (!) 96/54   BP Location: Left arm Left arm Right forearm Right arm   Patient Position: Lying Lying Lying Sitting   Pulse: 71 78 70 71   Resp: 17 17 19    Temp:  98.9 °F (37.2 °C) 98.6 °F (37 °C)    TempSrc:  Oral Oral    SpO2: 97% 95% (!) 92% (!) 94%   Weight:       Height:            Review of Systems  Review of Systems   Gastrointestinal:  Positive for abdominal pain and diarrhea (loose). Negative for nausea and vomiting.       Brief Physical Exam/Reassessment   Physical Exam  Vitals reviewed.   Constitutional:       General: She is not in acute distress.     Appearance: She is obese. She is not diaphoretic.   HENT:      Head: Normocephalic and atraumatic.   Cardiovascular:      Rate and Rhythm: Normal rate and regular rhythm.      Heart sounds: Heart sounds not distant. No murmur heard.     No friction rub. No gallop.   Pulmonary:      Effort: Pulmonary effort is normal. No respiratory distress.      Breath sounds: Normal breath sounds. No decreased breath sounds, wheezing, rhonchi or rales.   Abdominal:      Palpations: Abdomen is soft. There is no mass.      Tenderness: There is abdominal tenderness in the left upper quadrant and left lower quadrant. There is no guarding.   Musculoskeletal:      Cervical back: Neck supple.   Skin:     General: Skin is warm and dry.   Neurological:      Mental Status: She is alert.   Psychiatric:         Mood and Affect: Mood and affect normal.         Labs/Imaging   Labs Reviewed   CBC W/ AUTO DIFFERENTIAL - Abnormal       Result Value    WBC 6.96      RBC 3.64 (*)     Hemoglobin  11.3 (*)     Hematocrit 33.6 (*)     MCV 92      MCH 31.0      MCHC 33.6      RDW 16.2 (*)     Platelets 189      MPV 12.4      Immature Granulocytes 0.4      Gran # (ANC) 6.0      Immature Grans (Abs) 0.03      Lymph # 0.6 (*)     Mono # 0.4      Eos # 0.0      Baso # 0.00      nRBC 0      Gran % 85.6 (*)     Lymph % 8.5 (*)     Mono % 5.5      Eosinophil % 0.0      Basophil % 0.0      Differential Method Automated     COMPREHENSIVE METABOLIC PANEL - Abnormal    Sodium 134 (*)     Potassium 3.4 (*)     Chloride 105      CO2 23      Glucose 111 (*)     BUN 12      Creatinine 0.8      Calcium 8.6 (*)     Total Protein 10.8 (*)     Albumin 2.9 (*)     Total Bilirubin 0.7      Alkaline Phosphatase 66      AST 14      ALT 15      eGFR >60.0      Anion Gap 6 (*)    URINALYSIS, REFLEX TO URINE CULTURE - Abnormal    Specimen UA Urine, Catheterized      Color, UA Yellow      Appearance, UA Clear      pH, UA 6.0      Specific Gravity, UA >1.030 (*)     Protein, UA 1+ (*)     Glucose, UA Negative      Ketones, UA Negative      Bilirubin (UA) Negative      Occult Blood UA Negative      Nitrite, UA Negative      Leukocytes, UA Negative      Narrative:     Specimen Source->Urine   URINALYSIS MICROSCOPIC - Abnormal    RBC, UA 4      WBC, UA 7 (*)     Bacteria Rare      Squam Epithel, UA 1      Hyaline Casts, UA 0      Microscopic Comment SEE COMMENT      Narrative:     Specimen Source->Urine   BASIC METABOLIC PANEL - Abnormal    Sodium 135 (*)     Potassium 3.4 (*)     Chloride 110      CO2 18 (*)     Glucose 101      BUN 20      Creatinine 0.9      Calcium 7.7 (*)     Anion Gap 7 (*)     eGFR >60.0     MAGNESIUM - Abnormal    Magnesium 1.5 (*)    CBC W/ AUTO DIFFERENTIAL - Abnormal    WBC 8.88      RBC 2.87 (*)     Hemoglobin 9.0 (*)     Hematocrit 26.6 (*)     MCV 93      MCH 31.4 (*)     MCHC 33.8      RDW 16.4 (*)     Platelets 141 (*)     MPV 12.4      Immature Granulocytes 0.5      Gran # (ANC) 7.0      Immature Grans (Abs)  0.04      Lymph # 1.2      Mono # 0.7      Eos # 0.0      Baso # 0.01      nRBC 0      Gran % 79.0 (*)     Lymph % 13.1 (*)     Mono % 7.3      Eosinophil % 0.0      Basophil % 0.1      Differential Method Automated     CULTURE, BLOOD    Blood Culture, Routine No Growth to date      Narrative:     Aerobic and anaerobic   CULTURE, BLOOD    Blood Culture, Routine No Growth to date      Narrative:     Aerobic and anaerobic   INFLUENZA A & B BY MOLECULAR    Influenza A, Molecular Negative      Influenza B, Molecular Negative      Flu A & B Source NP     SARS-COV-2 RNA AMPLIFICATION, QUAL    SARS-CoV-2 RNA, Amplification, Qual Negative     LIPASE    Lipase 11     B-TYPE NATRIURETIC PEPTIDE   TROPONIN I HIGH SENSITIVITY    Troponin I High Sensitivity 6     LACTIC ACID, PLASMA      Imaging Results               CT Abdomen Pelvis With IV Contrast NO Oral Contrast (Final result)  Result time 01/29/25 15:26:35      Final result by James Rosales MD (01/29/25 15:26:35)                   Impression:      This report was flagged in Epic as abnormal.    1. Findings suggesting infectious or inflammatory enteritis involving the proximal small bowel.  Correlation and follow-up is advised, no high-grade obstruction.  2. There is hepatomegaly noting possible steatosis, correlation with LFTs recommended.  3. Cholelithiasis, no secondary findings to suggest acute cholecystitis.  4. Moderate hiatal hernia.  5. Please see above for additional findings.      Electronically signed by: James Rosales MD  Date:    01/29/2025  Time:    15:26               Narrative:    EXAMINATION:  CT ABDOMEN PELVIS WITH IV CONTRAST    CLINICAL HISTORY:  Abdominal pain, acute, nonlocalized;    TECHNIQUE:  Low dose axial images, sagittal and coronal reformations were obtained from the lung bases to the pubic symphysis following the IV administration of 100 mL of Omnipaque 350 .  Oral contrast was not given.    COMPARISON:  CT chest abdomen and pelvis  09/22/2023    FINDINGS:  Images of the lower thorax are remarkable for bilateral dependent atelectasis/scarring.  Pacer wires noted.  There is a moderate hiatal hernia.  Liquid content within the distal esophagus suggests sequela gastroesophageal reflux.    The liver is mildly hypoattenuating, possibly reflecting steatosis, correlation with LFTs recommended.  The liver is enlarged.  The spleen, pancreas and adrenal glands are grossly unremarkable.  There is cholelithiasis/sludge, no secondary findings to suggest acute cholecystitis.  The stomach is nondistended, no gastric wall thickening.  The portal vein, splenic vein, SMV, celiac axis and SMA all are patent.  No significant abdominal lymphadenopathy.    The kidneys enhance symmetrically without hydronephrosis or nephrolithiasis.  A subcentimeter low attenuating lesion arises from the interpolar region of the right kidney.  The bilateral ureters are unremarkable without calculi seen noting the ureters cannot be followed in their entirety to the urinary bladder.  No secondary findings to suggest obstructive uropathy.  The urinary bladder is unremarkable.  The uterus is unremarkable.  The adnexa is unremarkable.    There are a few scattered colonic diverticula without inflammation to suggest diverticulitis.  The terminal ileum is unremarkable.  The appendix or appendiceal stump is unremarkable.  There are several thickened loops of proximal jejunum noting surrounding inflammation and vascular engorgement of the mesentery.  Distally, the small bowel returns to normal caliber.  No pneumatosis or free air.  There are a few scattered shotty periaortic, pericaval, and mesenteric lymph nodes.  No focal organized pelvic fluid collection.    There is osteopenia.  There are degenerative changes of the spine.  No significant inguinal lymphadenopathy.                                       X-Ray Chest AP Portable (Final result)  Result time 01/29/25 13:40:39      Final result  by James Lawson MD (01/29/25 13:40:39)                   Impression:      Cardiac defibrillator.    No acute chest disease identified.      Electronically signed by: James aLwson MD  Date:    01/29/2025  Time:    13:40               Narrative:    EXAMINATION:  XR CHEST AP PORTABLE    CLINICAL HISTORY:  Sepsis;    TECHNIQUE:  Single frontal view of the chest was performed.    COMPARISON:  11/07/2024.    FINDINGS:  There is a cardiac defibrillator in place as before.  The heart and mediastinal structures are within normal limits in size and unchanged.  Pulmonary vasculature is unremarkable.  The lungs are well aerated and free of focal consolidations.  There is no evidence for pneumothorax or large pleural effusions.  Bony structures are grossly intact.                                       I reviewed all labs, imaging, EKGs.     Plan   L-sided abdominal pain  Enteritis  Fever     - t max 102.9, currently AFVSS on RA  - no leukocytosis or significant electrolyte abnormalities, LFT wnl, lipase wnl  - UA noninfectious  - CXR unremarkable  - CT a/p: Findings suggesting infectious or inflammatory enteritis involving the proximal small bowel. There is hepatomegaly noting possible steatosis, correlation with LFTs recommended. Cholelithiasis, no secondary findings to suggest acute cholecystitis. Moderate hiatal hernia.  - CLD  - prn antiemetics  - scheduled tylenol, prn toradol  - flagyl and ctx (no cipro d/t prolonged qt)  - continue monitoring, daily labs, follow bcx   - Upgrade to Hosp medicine    I have discussed this case with MARIO Martinez.       Hi Whyte MD  Department of Hospital Medicine  [facility] - Emergency Dept

## 2025-01-30 NOTE — SUBJECTIVE & OBJECTIVE
Past Medical History:   Diagnosis Date    Anticoagulant long-term use     Anxiety     Asthma     Atrial fibrillation     Brain anoxic injury     Cervicalgia 8/28/2014    CHI (closed head injury) 2/19/2013    Convulsion 5/30/2015    Decreased ROM of left shoulder 4/12/2017    Defibrillator activation 2013    Depression     Heart block     History of sudden cardiac arrest 2/2013    PEA arrest with subsequent long-QT    Hx of psychiatric care     Hypertension     Left atrial enlargement 4/11/2018    Pacemaker 2013    Paresthesia 11/1/2013    Prolonged Q-T interval on ECG 2/8/2013    Psychiatric problem     Seizures     Sleep difficulties     Stroke     weakness lt side    Therapy     Thyroid disease     Upper airway resistance syndrome 2/21/2017       Past Surgical History:   Procedure Laterality Date    breast reduction      CARDIAC DEFIBRILLATOR PLACEMENT      CARDIAC DEFIBRILLATOR PLACEMENT      CARPAL TUNNEL RELEASE Right     COLONOSCOPY N/A 5/7/2019    Procedure: COLONOSCOPY;  Surgeon: Juan Coffey MD;  Location: [facility] (2ND FLR);  Service: Endoscopy;  Laterality: N/A;  per DR. Bustamante Pt to have balloon with DR. Coffey due to excesive looping, could not reach cecum - see telephone encounter 3/8/19 and last procedure report dated 6/24/14/ Per Piedad schedule as 90 min case- ERW  pacemaker/AICD Boitronik/   per , ok to hold Xareltox 2 days    COLONOSCOPY N/A 6/2/2022    Procedure: COLONOSCOPY;  Surgeon: Juan Coffey MD;  Location: [facility] (2ND FLR);  Service: Endoscopy;  Laterality: N/A;  combined orders    ESOPHAGOGASTRODUODENOSCOPY N/A 6/2/2022    Procedure: EGD (ESOPHAGOGASTRODUODENOSCOPY);  Surgeon: Juan Coffey MD;  Location: [facility] (2ND FLR);  Service: Endoscopy;  Laterality: N/A;  BMI 54; pt requests 1st available [facility]  Fully vaccinated, Hold Xarelto x 2 days per Dr. Mejia and Plavix x 5 days per Dr. Grossman see telephone encounter 4/25/22, Biotronik PM/AICD, prep instr portal and mailed  -ml    HERNIA REPAIR      HIATAL HERNIA REPAIR      INSERT / REPLACE / REMOVE PACEMAKER  10/2017    CRT-D upgrade    REVISION OF IMPLANTABLE CARDIOVERTER-DEFIBRILLATOR (ICD) ELECTRODE LEAD PLACEMENT Left 12/7/2020    Procedure: REVISION, INSERTION, ELECTRODE LEAD, ICD;  Surgeon: Avelnio Mejia MD;  Location: [facility] EP LAB;  Service: Cardiology;  Laterality: Left;    REVISION OF SKIN POCKET FOR CARDIOVERTER-DEFIBRILLATOR  12/7/2020    Procedure: Revision, Skin Pocket, For Cardioverter-Defibrillator;  Surgeon: Avleino Mejia MD;  Location: [facility] EP LAB;  Service: Cardiology;;    TOTAL REDUCTION MAMMOPLASTY      TRANSESOPHAGEAL ECHOCARDIOGRAPHY N/A 12/7/2020    Procedure: ECHOCARDIOGRAM, TRANSESOPHAGEAL;  Surgeon: Ivory Goodman MD;  Location: [facility] EP LAB;  Service: Cardiology;  Laterality: N/A;    TRANSESOPHAGEAL ECHOCARDIOGRAPHY N/A 12/7/2020    Procedure: ECHOCARDIOGRAM, TRANSESOPHAGEAL;  Surgeon: Patrick Diagnostic Provider;  Location: [address] FLR;  Service: Cardiology;  Laterality: N/A;    TRIGGER FINGER RELEASE Left 8/2/2019    Procedure: RELEASE, TRIGGER FINGER left middle;  Surgeon: Matt Pugh Jr., MD;  Location: [facility];  Service: Plastics;  Laterality: Left;    TRIGGER FINGER RELEASE Right 2/11/2020    Procedure: RELEASE, TRIGGER FINGER middle;  Surgeon: Matt Pugh Jr., MD;  Location: [facility];  Service: Plastics;  Laterality: Right;    TUBAL LIGATION         Review of patient's allergies indicates:   Allergen Reactions    Aspirin Hives    Imitrex [sumatriptan] Palpitations    Penicillins Hives and Swelling    Shellfish containing products Anaphylaxis     seafood    Reglan [metoclopramide hcl] Other (See Comments)     Parkinsonism      Family History       Problem Relation (Age of Onset)    Asthma Daughter, Daughter    COPD Mother, Other    Glaucoma Maternal Grandmother, Paternal Grandmother    Heart failure Other    Hypertension Mother, Father          Tobacco Use    Smoking status: Never      Passive exposure: Never    Smokeless tobacco: Never   Substance and Sexual Activity    Alcohol use: Not Currently    Drug use: No    Sexual activity: Not Currently     Review of Systems   Constitutional:  Positive for appetite change, fatigue and fever. Negative for activity change, chills and diaphoresis.   Gastrointestinal:  Positive for abdominal pain, diarrhea, nausea and vomiting. Negative for abdominal distention, anal bleeding, blood in stool, constipation and rectal pain.   Neurological:  Positive for weakness. Negative for dizziness, syncope and light-headedness.     Objective:     Vital Signs (Most Recent):  Temp: (!) 94 °F (34.4 °C) (01/30/25 1259)  Pulse: 73 (01/30/25 1259)  Resp: 20 (01/30/25 1259)  BP: (!) 155/86 (01/30/25 1259)  SpO2: (!) 94 % (01/30/25 0836) Vital Signs (24h Range):  Temp:  [94 °F (34.4 °C)-99.8 °F (37.7 °C)] 94 °F (34.4 °C)  Pulse:  [58-96] 73  Resp:  [16-22] 20  SpO2:  [92 %-98 %] 94 %  BP: ()/(37-86) 155/86     Weight: 131.1 kg (289 lb) (01/29/25 1203)  Body mass index is 49.61 kg/m².      Intake/Output Summary (Last 24 hours) at 1/30/2025 1312  Last data filed at 1/30/2025 1044  Gross per 24 hour   Intake 2198.34 ml   Output 25 ml   Net 2173.34 ml       Lines/Drains/Airways       Drain  Duration             Female External Urinary Catheter w/ Suction 01/29/25 2130 <1 day              Peripheral Intravenous Line  Duration                  Peripheral IV - Single Lumen 01/29/25 1302 20 G Anterior;Left;Proximal Forearm 1 day                     Physical Exam  Vitals and nursing note reviewed.   Constitutional:       General: She is not in acute distress.     Appearance: She is obese. She is ill-appearing. She is not diaphoretic.   HENT:      Head: Normocephalic and atraumatic.      Mouth/Throat:      Pharynx: Oropharynx is clear.   Eyes:      General: No scleral icterus.     Extraocular Movements: Extraocular movements intact.      Conjunctiva/sclera: Conjunctivae normal.    Pulmonary:      Effort: Pulmonary effort is normal. No respiratory distress.   Abdominal:      General: There is no distension.      Palpations: Abdomen is soft. There is no mass.      Tenderness: There is abdominal tenderness. There is guarding. There is no rebound.      Hernia: No hernia is present.   Musculoskeletal:         General: Normal range of motion.      Cervical back: Normal range of motion.   Skin:     General: Skin is warm and dry.   Neurological:      Mental Status: She is alert and oriented to person, place, and time. Mental status is at baseline.   Psychiatric:         Mood and Affect: Mood normal.         Behavior: Behavior normal.          Significant Labs:  CBC:   Recent Labs   Lab 01/29/25  1302 01/30/25  0334 01/30/25  1128   WBC 6.96 8.88 9.36   HGB 11.3* 9.0* 9.1*   HCT 33.6* 26.6* 29.0*    141* 120*     CMP:   Recent Labs   Lab 01/29/25  1302 01/30/25  0334   * 101   CALCIUM 8.6* 7.7*   ALBUMIN 2.9*  --    PROT 10.8*  --    * 135*   K 3.4* 3.4*   CO2 23 18*    110   BUN 12 20   CREATININE 0.8 0.9   ALKPHOS 66  --    ALT 15  --    AST 14  --    BILITOT 0.7  --        Significant Imaging:  Imaging results within the past 24 hours have been reviewed.    CT A/P: There is a moderate hiatal hernia. Liquid content within the distal esophagus suggests sequela gastroesophageal reflux.  There are a few scattered colonic diverticula without inflammation to suggest diverticulitis. There are several thickened loops of proximal jejunum noting surrounding inflammation and vascular engorgement of the mesentery. Distally, the small bowel returns to normal caliber.

## 2025-01-31 ENCOUNTER — EXTERNAL CHRONIC CARE MANAGEMENT (OUTPATIENT)
Dept: PRIMARY CARE CLINIC | Facility: CLINIC | Age: 60
End: 2025-01-31
Payer: MEDICARE

## 2025-01-31 PROBLEM — R50.9 FEVER: Status: RESOLVED | Noted: 2025-01-29 | Resolved: 2025-01-31

## 2025-01-31 LAB
ALBUMIN SERPL BCP-MCNC: 2.4 G/DL (ref 3.5–5.2)
ALP SERPL-CCNC: 55 U/L (ref 40–150)
ALT SERPL W/O P-5'-P-CCNC: 11 U/L (ref 10–44)
ANION GAP SERPL CALC-SCNC: 5 MMOL/L (ref 8–16)
AST SERPL-CCNC: 10 U/L (ref 10–40)
BASOPHILS # BLD AUTO: 0 K/UL (ref 0–0.2)
BASOPHILS # BLD AUTO: 0 K/UL (ref 0–0.2)
BASOPHILS NFR BLD: 0 % (ref 0–1.9)
BASOPHILS NFR BLD: 0 % (ref 0–1.9)
BILIRUB SERPL-MCNC: 0.3 MG/DL (ref 0.1–1)
BUN SERPL-MCNC: 13 MG/DL (ref 6–20)
CALCIUM SERPL-MCNC: 8.9 MG/DL (ref 8.7–10.5)
CHLORIDE SERPL-SCNC: 110 MMOL/L (ref 95–110)
CO2 SERPL-SCNC: 20 MMOL/L (ref 23–29)
CREAT SERPL-MCNC: 0.7 MG/DL (ref 0.5–1.4)
DIFFERENTIAL METHOD BLD: ABNORMAL
DIFFERENTIAL METHOD BLD: ABNORMAL
EOSINOPHIL # BLD AUTO: 0 K/UL (ref 0–0.5)
EOSINOPHIL # BLD AUTO: 0 K/UL (ref 0–0.5)
EOSINOPHIL NFR BLD: 0 % (ref 0–8)
EOSINOPHIL NFR BLD: 0 % (ref 0–8)
ERYTHROCYTE [DISTWIDTH] IN BLOOD BY AUTOMATED COUNT: 17 % (ref 11.5–14.5)
ERYTHROCYTE [DISTWIDTH] IN BLOOD BY AUTOMATED COUNT: 17.1 % (ref 11.5–14.5)
EST. GFR  (NO RACE VARIABLE): >60 ML/MIN/1.73 M^2
GLUCOSE SERPL-MCNC: 94 MG/DL (ref 70–110)
HCT VFR BLD AUTO: 30.6 % (ref 37–48.5)
HCT VFR BLD AUTO: 31.7 % (ref 37–48.5)
HGB BLD-MCNC: 10.2 G/DL (ref 12–16)
HGB BLD-MCNC: 10.6 G/DL (ref 12–16)
IMM GRANULOCYTES # BLD AUTO: 0.03 K/UL (ref 0–0.04)
IMM GRANULOCYTES # BLD AUTO: 0.03 K/UL (ref 0–0.04)
IMM GRANULOCYTES NFR BLD AUTO: 0.3 % (ref 0–0.5)
IMM GRANULOCYTES NFR BLD AUTO: 0.3 % (ref 0–0.5)
LYMPHOCYTES # BLD AUTO: 1 K/UL (ref 1–4.8)
LYMPHOCYTES # BLD AUTO: 1.1 K/UL (ref 1–4.8)
LYMPHOCYTES NFR BLD: 11.1 % (ref 18–48)
LYMPHOCYTES NFR BLD: 11.8 % (ref 18–48)
MAGNESIUM SERPL-MCNC: 2 MG/DL (ref 1.6–2.6)
MCH RBC QN AUTO: 31.2 PG (ref 27–31)
MCH RBC QN AUTO: 31.3 PG (ref 27–31)
MCHC RBC AUTO-ENTMCNC: 33.3 G/DL (ref 32–36)
MCHC RBC AUTO-ENTMCNC: 33.4 G/DL (ref 32–36)
MCV RBC AUTO: 94 FL (ref 82–98)
MCV RBC AUTO: 94 FL (ref 82–98)
MONOCYTES # BLD AUTO: 0.4 K/UL (ref 0.3–1)
MONOCYTES # BLD AUTO: 0.6 K/UL (ref 0.3–1)
MONOCYTES NFR BLD: 4.8 % (ref 4–15)
MONOCYTES NFR BLD: 5.7 % (ref 4–15)
NEUTROPHILS # BLD AUTO: 7.6 K/UL (ref 1.8–7.7)
NEUTROPHILS # BLD AUTO: 8 K/UL (ref 1.8–7.7)
NEUTROPHILS NFR BLD: 82.2 % (ref 38–73)
NEUTROPHILS NFR BLD: 83.8 % (ref 38–73)
NRBC BLD-RTO: 0 /100 WBC
NRBC BLD-RTO: 0 /100 WBC
PLATELET # BLD AUTO: 177 K/UL (ref 150–450)
PLATELET # BLD AUTO: 181 K/UL (ref 150–450)
PMV BLD AUTO: 11.9 FL (ref 9.2–12.9)
PMV BLD AUTO: 12.6 FL (ref 9.2–12.9)
POTASSIUM SERPL-SCNC: 3.9 MMOL/L (ref 3.5–5.1)
PROT SERPL-MCNC: 9.8 G/DL (ref 6–8.4)
RBC # BLD AUTO: 3.27 M/UL (ref 4–5.4)
RBC # BLD AUTO: 3.39 M/UL (ref 4–5.4)
SODIUM SERPL-SCNC: 135 MMOL/L (ref 136–145)
WBC # BLD AUTO: 9.03 K/UL (ref 3.9–12.7)
WBC # BLD AUTO: 9.68 K/UL (ref 3.9–12.7)

## 2025-01-31 PROCEDURE — 89055 LEUKOCYTE ASSESSMENT FECAL: CPT

## 2025-01-31 PROCEDURE — 36415 COLL VENOUS BLD VENIPUNCTURE: CPT

## 2025-01-31 PROCEDURE — 11000001 HC ACUTE MED/SURG PRIVATE ROOM

## 2025-01-31 PROCEDURE — 63600175 PHARM REV CODE 636 W HCPCS

## 2025-01-31 PROCEDURE — 87338 HPYLORI STOOL AG IA: CPT

## 2025-01-31 PROCEDURE — 87329 GIARDIA AG IA: CPT

## 2025-01-31 PROCEDURE — 83993 ASSAY FOR CALPROTECTIN FECAL: CPT

## 2025-01-31 PROCEDURE — A4216 STERILE WATER/SALINE, 10 ML: HCPCS | Performed by: PHYSICIAN ASSISTANT

## 2025-01-31 PROCEDURE — 99232 SBSQ HOSP IP/OBS MODERATE 35: CPT | Mod: ,,,

## 2025-01-31 PROCEDURE — 80053 COMPREHEN METABOLIC PANEL: CPT

## 2025-01-31 PROCEDURE — 99439 CHRNC CARE MGMT STAF EA ADDL: CPT | Mod: S$GLB,,, | Performed by: INTERNAL MEDICINE

## 2025-01-31 PROCEDURE — 63600175 PHARM REV CODE 636 W HCPCS: Performed by: PHYSICIAN ASSISTANT

## 2025-01-31 PROCEDURE — 85025 COMPLETE CBC W/AUTO DIFF WBC: CPT | Mod: 91

## 2025-01-31 PROCEDURE — 63600175 PHARM REV CODE 636 W HCPCS: Performed by: INTERNAL MEDICINE

## 2025-01-31 PROCEDURE — 25000003 PHARM REV CODE 250: Performed by: PHYSICIAN ASSISTANT

## 2025-01-31 PROCEDURE — 99490 CHRNC CARE MGMT STAFF 1ST 20: CPT | Mod: S$GLB,,, | Performed by: INTERNAL MEDICINE

## 2025-01-31 PROCEDURE — 25000003 PHARM REV CODE 250

## 2025-01-31 PROCEDURE — 83735 ASSAY OF MAGNESIUM: CPT

## 2025-01-31 PROCEDURE — 87425 ROTAVIRUS AG IA: CPT

## 2025-01-31 RX ORDER — HYDROCODONE BITARTRATE AND ACETAMINOPHEN 5; 325 MG/1; MG/1
1 TABLET ORAL EVERY 4 HOURS PRN
Status: DISCONTINUED | OUTPATIENT
Start: 2025-01-31 | End: 2025-02-01

## 2025-01-31 RX ORDER — MORPHINE SULFATE 2 MG/ML
2 INJECTION, SOLUTION INTRAMUSCULAR; INTRAVENOUS EVERY 6 HOURS PRN
Status: DISCONTINUED | OUTPATIENT
Start: 2025-01-31 | End: 2025-02-01

## 2025-01-31 RX ORDER — CEFTRIAXONE 2 G/1
2 INJECTION, POWDER, FOR SOLUTION INTRAMUSCULAR; INTRAVENOUS
Status: DISCONTINUED | OUTPATIENT
Start: 2025-01-31 | End: 2025-02-02

## 2025-01-31 RX ORDER — ACETAMINOPHEN 500 MG
1000 TABLET ORAL EVERY 8 HOURS
Status: DISCONTINUED | OUTPATIENT
Start: 2025-01-31 | End: 2025-01-31

## 2025-01-31 RX ORDER — ZOLPIDEM TARTRATE 5 MG/1
5 TABLET ORAL NIGHTLY PRN
Status: DISCONTINUED | OUTPATIENT
Start: 2025-01-31 | End: 2025-02-02 | Stop reason: HOSPADM

## 2025-01-31 RX ORDER — DICYCLOMINE HYDROCHLORIDE 10 MG/1
10 CAPSULE ORAL 2 TIMES DAILY
Status: DISCONTINUED | OUTPATIENT
Start: 2025-01-31 | End: 2025-02-02 | Stop reason: HOSPADM

## 2025-01-31 RX ORDER — PANTOPRAZOLE SODIUM 40 MG/1
40 TABLET, DELAYED RELEASE ORAL DAILY
Status: DISCONTINUED | OUTPATIENT
Start: 2025-01-31 | End: 2025-02-02 | Stop reason: HOSPADM

## 2025-01-31 RX ORDER — HYDROCODONE BITARTRATE AND ACETAMINOPHEN 5; 325 MG/1; MG/1
1 TABLET ORAL EVERY 6 HOURS PRN
Status: CANCELLED | OUTPATIENT
Start: 2025-01-31

## 2025-01-31 RX ORDER — MORPHINE SULFATE 4 MG/ML
4 INJECTION, SOLUTION INTRAMUSCULAR; INTRAVENOUS EVERY 6 HOURS PRN
Status: DISCONTINUED | OUTPATIENT
Start: 2025-01-31 | End: 2025-01-31

## 2025-01-31 RX ORDER — SUCRALFATE 1 G/1
1 TABLET ORAL
Status: DISCONTINUED | OUTPATIENT
Start: 2025-01-31 | End: 2025-02-02 | Stop reason: HOSPADM

## 2025-01-31 RX ORDER — AMLODIPINE BESYLATE 5 MG/1
5 TABLET ORAL DAILY
Status: DISCONTINUED | OUTPATIENT
Start: 2025-01-31 | End: 2025-02-02 | Stop reason: HOSPADM

## 2025-01-31 RX ADMIN — CYANOCOBALAMIN TAB 1000 MCG 1000 MCG: 1000 TAB at 09:01

## 2025-01-31 RX ADMIN — AMLODIPINE BESYLATE 5 MG: 5 TABLET ORAL at 04:01

## 2025-01-31 RX ADMIN — ATORVASTATIN CALCIUM 80 MG: 40 TABLET, FILM COATED ORAL at 09:01

## 2025-01-31 RX ADMIN — MORPHINE SULFATE 2 MG: 2 INJECTION, SOLUTION INTRAMUSCULAR; INTRAVENOUS at 07:01

## 2025-01-31 RX ADMIN — SUCRALFATE 1 G: 1 TABLET ORAL at 04:01

## 2025-01-31 RX ADMIN — METRONIDAZOLE 500 MG: 500 INJECTION, SOLUTION INTRAVENOUS at 02:01

## 2025-01-31 RX ADMIN — PANTOPRAZOLE SODIUM 40 MG: 40 TABLET, DELAYED RELEASE ORAL at 10:01

## 2025-01-31 RX ADMIN — HYDROCODONE BITARTRATE AND ACETAMINOPHEN 1 TABLET: 5; 325 TABLET ORAL at 09:01

## 2025-01-31 RX ADMIN — CEFTRIAXONE 2 G: 2 INJECTION, POWDER, FOR SOLUTION INTRAMUSCULAR; INTRAVENOUS at 11:01

## 2025-01-31 RX ADMIN — SODIUM CHLORIDE, PRESERVATIVE FREE 10 ML: 5 INJECTION INTRAVENOUS at 10:01

## 2025-01-31 RX ADMIN — SODIUM CHLORIDE, PRESERVATIVE FREE 10 ML: 5 INJECTION INTRAVENOUS at 02:01

## 2025-01-31 RX ADMIN — SODIUM CHLORIDE, PRESERVATIVE FREE 10 ML: 5 INJECTION INTRAVENOUS at 06:01

## 2025-01-31 RX ADMIN — PROCHLORPERAZINE EDISYLATE 5 MG: 5 INJECTION INTRAMUSCULAR; INTRAVENOUS at 05:01

## 2025-01-31 RX ADMIN — PROCHLORPERAZINE EDISYLATE 5 MG: 5 INJECTION INTRAMUSCULAR; INTRAVENOUS at 09:01

## 2025-01-31 RX ADMIN — DICYCLOMINE HYDROCHLORIDE 10 MG: 10 CAPSULE ORAL at 09:01

## 2025-01-31 RX ADMIN — SUCRALFATE 1 G: 1 TABLET ORAL at 10:01

## 2025-01-31 RX ADMIN — SUCRALFATE 1 G: 1 TABLET ORAL at 09:01

## 2025-01-31 RX ADMIN — METRONIDAZOLE 500 MG: 500 INJECTION, SOLUTION INTRAVENOUS at 10:01

## 2025-01-31 RX ADMIN — METRONIDAZOLE 500 MG: 500 INJECTION, SOLUTION INTRAVENOUS at 06:01

## 2025-01-31 RX ADMIN — RIVAROXABAN 20 MG: 20 TABLET, FILM COATED ORAL at 04:01

## 2025-01-31 RX ADMIN — GABAPENTIN 300 MG: 300 CAPSULE ORAL at 09:01

## 2025-01-31 NOTE — MEDICAL/APP STUDENT
[facility] Medicine Student   Progress Note  [facility]    Admit Date: 1/29/2025  Hospital Day: 1 01/31/2025  8:25 AM    SUBJECTIVE:   Ms. Amna Chawla is a 59 y.o. female with a medical history of cardiac arrest, stroke, a-fib, HTN who presented with LUQ, LLQ pain that began Wednesday morning and a few episodes of coffee-ground emesis who is being followed up for Lower abdominal pain.    Interval history: Overnight she states she was unable to sleep from the pain. She has not vomited since yesterday but reports 1 episode of loose green diarrhea this morning. She currently rates the pain 10/10, unchanged from yesterday. She does note she normally takes 10mg Ambien at home as needed.    Review of Systems   Constitutional:  Negative for chills and fever.   HENT: Negative.     Eyes: Negative.    Respiratory:  Negative for cough and hemoptysis.    Cardiovascular:  Negative for chest pain, palpitations and leg swelling.   Gastrointestinal:  Positive for abdominal pain and diarrhea. Negative for blood in stool.        LUQ and LLQ, green loose stool   Genitourinary:  Negative for dysuria and urgency.   Musculoskeletal:  Negative for myalgias and neck pain.   Skin:  Negative for rash.   Neurological: Negative.    Endo/Heme/Allergies:  Does not bruise/bleed easily.   Psychiatric/Behavioral: Negative.         Please refer to the H&P for past medical, family, and social history.    OBJECTIVE:     Vital Signs Recent:  Temp: 98.2 °F (36.8 °C) (01/31/25 0816)  Pulse: 87 (01/31/25 0816)  Resp: 17 (01/31/25 0816)  BP: (!) 178/100 (01/31/25 0816)  SpO2: 95 % (01/31/25 0816)  Oxygen Documentation:                Device (Oxygen Therapy): room air         Vital Signs Range (Last 24H):  Temp:  [97 °F (36.1 °C)-98.6 °F (37 °C)]   Pulse:  []   Resp:  [16-20]   BP: ()/()   SpO2:  [91 %-95 %]        I & O (Last 24H):  Intake/Output Summary (Last 24 hours) at 1/31/2025 0846  Last data filed at 1/31/2025 0623  Gross  per 24 hour   Intake --   Output 435 ml   Net -435 ml        Physical Exam:  Physical Exam  Constitutional:       Appearance: She is obese.   HENT:      Head: Normocephalic.      Mouth/Throat:      Mouth: Mucous membranes are dry.   Cardiovascular:      Rate and Rhythm: Normal rate and regular rhythm.      Pulses: Normal pulses.      Heart sounds: Normal heart sounds.   Pulmonary:      Effort: Pulmonary effort is normal.      Breath sounds: Normal breath sounds.   Abdominal:      Tenderness: There is abdominal tenderness.      Comments: Pain with palpation of LUQ and LLQ   Musculoskeletal:      Right lower leg: No edema.      Left lower leg: No edema.   Skin:     General: Skin is warm and dry.   Neurological:      Mental Status: She is alert.       Labs:   Recent Labs   Lab 01/29/25  1302 01/30/25  0334 01/31/25  0554   * 135* 135*   K 3.4* 3.4* 3.9    110 110   CO2 23 18* 20*   BUN 12 20 13   CREATININE 0.8 0.9 0.7   * 101 94   CALCIUM 8.6* 7.7* 8.9   MG  --  1.5* 2.0     Recent Labs   Lab 01/29/25  1302 01/31/25  0554   ALKPHOS 66 55   ALT 15 11   AST 14 10   ALBUMIN 2.9* 2.4*   PROT 10.8* 9.8*   BILITOT 0.7 0.3     Recent Labs   Lab 01/30/25  1128 01/30/25  1740 01/31/25  0554   WBC 9.36 10.57 9.68   HGB 9.1* 9.7* 10.2*   HCT 29.0* 28.8* 30.6*   * 151 177       Diagnostic Results:  No new imaging    Scheduled Meds:   ARIPiprazole  15 mg Oral Daily    atorvastatin  80 mg Oral QHS    cefTRIAXone (Rocephin) IV (PEDS and ADULTS)  1 g Intravenous Q24H    cyanocobalamin  1,000 mcg Oral Daily    donepeziL  10 mg Oral BID    gabapentin  300 mg Oral TID    metroNIDAZOLE IV (PEDS and ADULTS)  500 mg Intravenous Q8H    pantoprazole  40 mg Intravenous BID    sertraline  200 mg Oral Daily    sodium chloride 0.9%  10 mL Intravenous Q8H     Continuous Infusions:  PRN Meds:  Current Facility-Administered Medications:     acetaminophen, 1,000 mg, Oral, Q8H PRN    albuterol-ipratropium, 3 mL,  Nebulization, Q6H PRN    calcium carbonate, 500 mg, Oral, TID PRN    dextrose 50%, 12.5 g, Intravenous, PRN    dextrose 50%, 25 g, Intravenous, PRN    glucagon (human recombinant), 1 mg, Intramuscular, PRN    glucose, 16 g, Oral, PRN    glucose, 24 g, Oral, PRN    iohexoL, 100 mL, Intravenous, ONCE PRN    morphine, 2 mg, Intravenous, Q6H PRN    naloxone, 0.02 mg, Intravenous, PRN    polyethylene glycol, 17 g, Oral, Daily PRN    prochlorperazine, 5 mg, Intravenous, Q6H PRN    simethicone, 1 tablet, Oral, QID PRN    zolpidem, 10 mg, Oral, Nightly PRN    ASSESSMENT/PLAN:     Active Hospital Problems    Diagnosis  POA    *Lower abdominal pain [R10.30]  Yes    Coffee ground emesis [K92.0]  No    Hypotension [I95.9]  Unknown    Fever [R50.9]  Yes    Gastroenteritis [K52.9]  Yes      Resolved Hospital Problems   No resolved problems to display.       Amna Chawla is a 59 y.o. female with a relevant medical history of cardiac arrest (s/p AICD Feb 2013), stroke with anoxic brain injury, a-fib, and hypertension who is being treated for Lower abdominal pain. She presented with significant abdominal pain in the left upper quadrant, mid-abdomen, and left flank. She had 5 episodes of clear emesis and 1 episode of 25ml of coffee-ground emesis. CT abdo/pelvis were consistent with possible enteritis.      Lower Abdominal Pain with Presumed Resolved GI Bleed  Patient presented with significant abdominal pain in the left upper quadrant, mid-abdomen, and left flank. She had 5 episodes of clear emesis and 1 episode of 25ml of coffee-ground emesis. CT abdo/pelvis consistent with possible enteritis, possible GI bleed possibly secondary to this. She has a history of anticoagulants and NSAID use for her migraine, which could contribute to ulcer formation. Her hemoglobin has gone up to 10.2 today, and her normal according to a hematology note from 2023 is a normocytic anemia running from 10-11.    PLAN:  - CBC/CMP daily  - Change IV  pantoprazole 40mg BID to oral 40mg BID  - If Hgb drops below 7g/dL, consider transfusion  - Consider antacids for symptomatic relief  - Advance diet as tolerated  - Tylenol PRN for pain     Enteritis  Patient presented with left-sided abdominal pain and fever of 102.2 last night. CT showed infectious/inflammatory process. Does not meet SIRS criteria.     PLAN:  - Continuing ceftriaxone and metronidazole.  - CBC daily  - Awaiting stool cultures     A-Fib  Patient taking Xarelto at home. [id number] Score - 5 (stroke risk 7.2% per year and 10% risk of stroke)              Calculated from: Age <65, Sex F, CHF No, HTN Yes, Stroke Yes, Vascular disease Yes     PLAN:  - Restart Xarelto today.     Cardiac Arrest with ICD  Patient reports no issues with chest pain. Patient reports not taking Coreg at home, despite records indicating she picked up the medication. Blood pressure readings to the 170s systolic today, possibly secondary to pain but likely due to cessation of home medications during hospital stay.     PLAN:  - Restart home amlodipine.    Shanda Boyle, MS4  [facility] Medicine  [facility]

## 2025-01-31 NOTE — NURSING
Patient was in restroom when lab came by to collect morning labs.  stated she will come back to draw patient labs

## 2025-01-31 NOTE — PLAN OF CARE
Problem: Adult Inpatient Plan of Care  Goal: Plan of Care Review  Outcome: Progressing  Goal: Patient-Specific Goal (Individualized)  Outcome: Progressing  Goal: Absence of Hospital-Acquired Illness or Injury  Outcome: Progressing  Goal: Optimal Comfort and Wellbeing  Outcome: Progressing  Goal: Readiness for Transition of Care  Outcome: Progressing     Problem: Bariatric Environmental Safety  Goal: Safety Maintained with Care  Outcome: Progressing     Problem: Infection  Goal: Absence of Infection Signs and Symptoms  Outcome: Progressing     Problem: Gastrointestinal Bleeding  Goal: Optimal Coping with Acute Illness  Outcome: Progressing  Goal: Hemostasis  Outcome: Progressing

## 2025-01-31 NOTE — PROGRESS NOTES
[facility] - [facility]  Gastroenterology  Progress Note    Patient Name: Amna Chawla  MRN: [id number]  Admission Date: 1/29/2025  Hospital Length of Stay: 1 days  Code Status: Full Code   Attending Provider: Ines Villalobos MD  Consulting Provider: Michelle Vogel NP  Primary Care Physician: Tiffany Mina MD  Principal Problem: Lower abdominal pain    Subjective:     Interval History: Followed up with patient for concerns of upper GI bleed with an episode of coffee ground emesis. No other episodes of CGE. Still with nausea and abd pain. Infection vs inflammatory enteritis noted on CT. Stool studies not collected yet. Counts have remained stable and she is now back to her baseline Hgb. Bowel movement this AM was soft and green.     Review of Systems   Constitutional:  Positive for fever. Negative for activity change, diaphoresis and unexpected weight change.   HENT:  Negative for sore throat and trouble swallowing.    Eyes:  Negative for redness.   Gastrointestinal:  Positive for abdominal pain and nausea. Negative for abdominal distention, anal bleeding, blood in stool, constipation, diarrhea, rectal pain and vomiting.   Skin:  Negative for color change, pallor and rash.   Neurological:  Negative for dizziness, syncope, weakness and headaches.     Objective:     Vital Signs (Most Recent):  Temp: 98.2 °F (36.8 °C) (01/31/25 0816)  Pulse: 87 (01/31/25 0816)  Resp: 17 (01/31/25 0816)  BP: (!) 178/100 (01/31/25 0816)  SpO2: 95 % (01/31/25 0816) Vital Signs (24h Range):  Temp:  [97 °F (36.1 °C)-98.3 °F (36.8 °C)] 98.2 °F (36.8 °C)  Pulse:  [] 87  Resp:  [16-20] 17  SpO2:  [91 %-95 %] 95 %  BP: (132-179)/() 178/100     Weight: 131.1 kg (289 lb) (01/30/25 2221)  Body mass index is 49.61 kg/m².      Intake/Output Summary (Last 24 hours) at 1/31/2025 0969  Last data filed at 1/31/2025 0623  Gross per 24 hour   Intake --   Output 435 ml   Net -435 ml       Lines/Drains/Airways       Drain  Duration              "Female External Urinary Catheter w/ Suction 01/29/25 2130 1 day              Peripheral Intravenous Line  Duration                  Peripheral IV - Single Lumen 01/29/25 1302 20 G Anterior;Left;Proximal Forearm 1 day         Peripheral IV - Single Lumen 01/30/25 1608 20 G Anterior;Distal;Right Forearm <1 day                     Physical Exam  Constitutional:       General: She is not in acute distress.     Appearance: She is ill-appearing.   Eyes:      General: No scleral icterus.  Cardiovascular:      Rate and Rhythm: Normal rate and regular rhythm.   Pulmonary:      Effort: Pulmonary effort is normal.      Breath sounds: Normal breath sounds.   Abdominal:      General: Bowel sounds are normal. There is no distension.      Palpations: Abdomen is soft.      Tenderness: There is generalized abdominal tenderness.   Skin:     Coloration: Skin is not jaundiced or pale.      Findings: No bruising or rash.   Neurological:      Mental Status: She is alert and oriented to person, place, and time. Mental status is at baseline.          Significant Labs:  CBC:   Recent Labs   Lab 01/30/25  1740 01/31/25  0554 01/31/25  0829   WBC 10.57 9.68 9.03   HGB 9.7* 10.2* 10.6*   HCT 28.8* 30.6* 31.7*    177 181     CMP:   Recent Labs   Lab 01/31/25  0554   GLU 94   CALCIUM 8.9   ALBUMIN 2.4*   PROT 9.8*   *   K 3.9   CO2 20*      BUN 13   CREATININE 0.7   ALKPHOS 55   ALT 11   AST 10   BILITOT 0.3     Coagulation: No results for input(s): "PT", "INR", "APTT" in the last 48 hours.      Significant Imaging:  Imaging results within the past 24 hours have been reviewed.  Assessment/Plan:     GI  Coffee ground emesis  No further episodes of coffee-ground emesis. Is still reporting nausea. Emesis yesterday was yellow in color. Still having diffuse abdominal pain and nausea.    - Treatment for gastroenteritis per primary team. Collect stool studies including GI pathogens panel.  - Continue supportive care and IVF " resuscitation as needed  - OK to advance diet as tolerated.   - Trend CBC daily. Transfuse for Hgb <7, unless otherwise indicated  - Maintain IV access with 2 large bore Ivs  - OK to restart anticoagulation  - Continue PPI daily  - Please notify GI team if there is significant change in patient's clinical status          Thank you for your consult. After careful review of labs and patient current status with the GI staff and fellow, it has been decided that I will sign off. Please contact us if you have any additional questions.    Michelle Vogel NP  Gastroenterology  [facility]

## 2025-01-31 NOTE — SUBJECTIVE & OBJECTIVE
Interval History: NAEON    Review of Systems   Constitutional:  Positive for chills and fever.   Respiratory:  Negative for cough, choking and shortness of breath.    Cardiovascular:  Negative for palpitations and leg swelling.   Gastrointestinal:  Positive for abdominal pain, diarrhea, nausea and vomiting. Negative for abdominal distention and blood in stool.   Genitourinary:  Negative for difficulty urinating, dysuria and hematuria.   Neurological:  Negative for dizziness and facial asymmetry.     Objective:     Vital Signs (Most Recent):  Temp: 98.1 °F (36.7 °C) (01/31/25 1200)  Pulse: 96 (01/31/25 1446)  Resp: 16 (01/31/25 1200)  BP: (!) 177/77 (01/31/25 1200)  SpO2: 96 % (01/31/25 1200) Vital Signs (24h Range):  Temp:  [97 °F (36.1 °C)-98.3 °F (36.8 °C)] 98.1 °F (36.7 °C)  Pulse:  [] 96  Resp:  [16-19] 16  SpO2:  [91 %-96 %] 96 %  BP: (132-179)/() 177/77     Weight: 131.1 kg (289 lb)  Body mass index is 49.61 kg/m².    Intake/Output Summary (Last 24 hours) at 1/31/2025 1502  Last data filed at 1/31/2025 0623  Gross per 24 hour   Intake --   Output 410 ml   Net -410 ml         Physical Exam  Constitutional:       General: She is not in acute distress.     Appearance: She is ill-appearing. She is not toxic-appearing or diaphoretic.   HENT:      Head: Normocephalic and atraumatic.      Mouth/Throat:      Mouth: Mucous membranes are dry.   Eyes:      General: No scleral icterus.     Extraocular Movements: Extraocular movements intact.   Cardiovascular:      Rate and Rhythm: Normal rate and regular rhythm.      Heart sounds: No murmur heard.     No gallop.   Pulmonary:      Effort: Pulmonary effort is normal.      Breath sounds: Normal breath sounds.   Abdominal:      General: There is no distension.      Palpations: Abdomen is soft.      Tenderness: There is abdominal tenderness. There is no guarding.   Skin:     General: Skin is warm and dry.   Neurological:      Mental Status: She is alert and  oriented to person, place, and time.   Psychiatric:         Behavior: Behavior normal.             Significant Labs: All pertinent labs within the past 24 hours have been reviewed.  CBC:   Recent Labs   Lab 01/30/25  1740 01/31/25  0554 01/31/25  0829   WBC 10.57 9.68 9.03   HGB 9.7* 10.2* 10.6*   HCT 28.8* 30.6* 31.7*    177 181     CMP:   Recent Labs   Lab 01/30/25  0334 01/31/25  0554   * 135*   K 3.4* 3.9    110   CO2 18* 20*    94   BUN 20 13   CREATININE 0.9 0.7   CALCIUM 7.7* 8.9   PROT  --  9.8*   ALBUMIN  --  2.4*   BILITOT  --  0.3   ALKPHOS  --  55   AST  --  10   ALT  --  11   ANIONGAP 7* 5*       Significant Imaging: I have reviewed all pertinent imaging results/findings within the past 24 hours.

## 2025-01-31 NOTE — ASSESSMENT & PLAN NOTE
Tmax 102.9F in ED w/ WBC 6.96 at admission. No further fevers. Recorded T 94F appears spurious. Likely s/t infectious gastroenteritis. Drug induced enteritis s/t heavy NSAID and Crohn's disease are in the differential. Consider ischemic bowel disease less likely in setting of normal LA and no hematochezia. Pancreatitis unlikely with normal lipase.    Plan:  - Avoid NSAID s/t c/f GI bleeding

## 2025-01-31 NOTE — HOSPITAL COURSE
Febrile with N/V and diarrhea at . Had a single episode of coffee-ground emesis on 1/30, but H/H stable. No need for EGD. Pain and nausea better on 2/1. Her symptoms were thought to be secondary to gastroenteritis, stool studies were sent but remained pending at the time of symptom improvement and discharge of the patient. Plan for close PCP follow-up for these results. She was continued on ceftriaxone and flagyl until symptom improvement on 2/2. Her diet was advanced and the patient felt comfortable with discharge with PO antiemetics and bentyl for cramping abdominal pain. During her admission she was noted to have a long-standing protein gap. Initial workup for multiple myeloma was sent which was pending at the time of discharge. A post-hospital follow-up was arranged for 1 week from discharge to ensure symptom resolution and follow-up of the above labs.

## 2025-01-31 NOTE — SUBJECTIVE & OBJECTIVE
Subjective:     Interval History: Followed up with patient for concerns of upper GI bleed with an episode of coffee ground emesis. No other episodes of CGE. Still with nausea and abd pain. Infection vs inflammatory enteritis noted on CT. Stool studies not collected yet. Counts have remained stable and she is now back to her baseline Hgb. Bowel movement this AM was soft and green.     Review of Systems   Constitutional:  Positive for fever. Negative for activity change, diaphoresis and unexpected weight change.   HENT:  Negative for sore throat and trouble swallowing.    Eyes:  Negative for redness.   Gastrointestinal:  Positive for abdominal pain and nausea. Negative for abdominal distention, anal bleeding, blood in stool, constipation, diarrhea, rectal pain and vomiting.   Skin:  Negative for color change, pallor and rash.   Neurological:  Negative for dizziness, syncope, weakness and headaches.     Objective:     Vital Signs (Most Recent):  Temp: 98.2 °F (36.8 °C) (01/31/25 0816)  Pulse: 87 (01/31/25 0816)  Resp: 17 (01/31/25 0816)  BP: (!) 178/100 (01/31/25 0816)  SpO2: 95 % (01/31/25 0816) Vital Signs (24h Range):  Temp:  [97 °F (36.1 °C)-98.3 °F (36.8 °C)] 98.2 °F (36.8 °C)  Pulse:  [] 87  Resp:  [16-20] 17  SpO2:  [91 %-95 %] 95 %  BP: (132-179)/() 178/100     Weight: 131.1 kg (289 lb) (01/30/25 2221)  Body mass index is 49.61 kg/m².      Intake/Output Summary (Last 24 hours) at 1/31/2025 0933  Last data filed at 1/31/2025 0623  Gross per 24 hour   Intake --   Output 435 ml   Net -435 ml       Lines/Drains/Airways       Drain  Duration             Female External Urinary Catheter w/ Suction 01/29/25 2130 1 day              Peripheral Intravenous Line  Duration                  Peripheral IV - Single Lumen 01/29/25 1302 20 G Anterior;Left;Proximal Forearm 1 day         Peripheral IV - Single Lumen 01/30/25 1608 20 G Anterior;Distal;Right Forearm <1 day                     Physical  "Exam  Constitutional:       General: She is not in acute distress.     Appearance: She is ill-appearing.   Eyes:      General: No scleral icterus.  Cardiovascular:      Rate and Rhythm: Normal rate and regular rhythm.   Pulmonary:      Effort: Pulmonary effort is normal.      Breath sounds: Normal breath sounds.   Abdominal:      General: Bowel sounds are normal. There is no distension.      Palpations: Abdomen is soft.      Tenderness: There is generalized abdominal tenderness.   Skin:     Coloration: Skin is not jaundiced or pale.      Findings: No bruising or rash.   Neurological:      Mental Status: She is alert and oriented to person, place, and time. Mental status is at baseline.          Significant Labs:  CBC:   Recent Labs   Lab 01/30/25  1740 01/31/25  0554 01/31/25  0829   WBC 10.57 9.68 9.03   HGB 9.7* 10.2* 10.6*   HCT 28.8* 30.6* 31.7*    177 181     CMP:   Recent Labs   Lab 01/31/25  0554   GLU 94   CALCIUM 8.9   ALBUMIN 2.4*   PROT 9.8*   *   K 3.9   CO2 20*      BUN 13   CREATININE 0.7   ALKPHOS 55   ALT 11   AST 10   BILITOT 0.3     Coagulation: No results for input(s): "PT", "INR", "APTT" in the last 48 hours.      Significant Imaging:  Imaging results within the past 24 hours have been reviewed.  "

## 2025-01-31 NOTE — ASSESSMENT & PLAN NOTE
Febrile with abrupt onset of pain to epigastrum, left hypochondrium,and left flank.    Plan:   - Serial abdominal exams  - dicyclomine 10 mg BID  - Norco 5 q4H  - Morphine 2 mg q6H

## 2025-01-31 NOTE — ASSESSMENT & PLAN NOTE
Patient's hemorrhage is due to gastrointestinal bleed, patient does have a propensity for bleeding due to a medication, the medication is rivaroxabam.. Patients most recent Hgb, Hct, platelets, and INR are listed below.  Recent Labs     01/30/25  1740 01/31/25  0554 01/31/25  0829   HGB 9.7* 10.2* 10.6*   HCT 28.8* 30.6* 31.7*    177 181       Plan  - H/H stable, single episode. No need for EGD.  - Resume rivaroxban

## 2025-01-31 NOTE — ASSESSMENT & PLAN NOTE
No further episodes of coffee-ground emesis. Is still reporting nausea. Emesis yesterday was yellow in color. Still having diffuse abdominal pain and nausea.    - Treatment for gastroenteritis per primary team. Collect stool studies including GI pathogens panel.  - Continue supportive care and IVF resuscitation as needed  - OK to advance diet as tolerated.   - Trend CBC daily. Transfuse for Hgb <7, unless otherwise indicated  - Maintain IV access with 2 large bore Ivs  - OK to restart anticoagulation  - Continue PPI daily  - Please notify GI team if there is significant change in patient's clinical status

## 2025-01-31 NOTE — PROGRESS NOTES
"[facility] Medicine  Progress Note    Patient Name: Amna Chawla  MRN: [id number]  Patient Class: IP- Inpatient   Admission Date: 1/29/2025  Length of Stay: 1 days  Attending Physician: Ines Villalobos MD  Primary Care Provider: Tiffany Mina MD        Subjective     Principal Problem:Lower abdominal pain        HPI:  Amna Chawla is a 59 y.o. F w/ a PMH s/f of cardiac arrest (s/p AICD Feb 2013), stroke with anoxic brain injury, a-fib, CRT, migraine, and hypertension who presents with left upper quadrant pain and left flank pain. She reports the pain began yesterday morning around 530am. She reports this pain is unlike anything she has experienced before. The pain has not been controlled with the NSADs takes at home.  She vomited "clear liquid" two times before she arrived in the ED, twice in the ED, and once this morning. Her most recent episode of emesis was coffee-ground of approximately 25 ml. She endorses diarrhea that began this morning. She also endorses cough, headache, and heartburn.    In the ED VS notable for a triage T 102.9 and hypotension 72/37 at one point. Labs remarkable for Hgb drop from 11 -> 9 and K 3.4. Imaging remarkable for findings suggesting infectious or inflammatory enteritis involving the proximal small bowel.    Admit to [facility]     Overview/Hospital Course:  Aebrile now. Still c/o N/V and diarrhea. Had a single episode of coffee-ground emesis on 1/30, but H/H stable. No need for EGD.     Interval History: NAEON    Review of Systems   Constitutional:  Positive for chills and fever.   Respiratory:  Negative for cough, choking and shortness of breath.    Cardiovascular:  Negative for palpitations and leg swelling.   Gastrointestinal:  Positive for abdominal pain, diarrhea, nausea and vomiting. Negative for abdominal distention and blood in stool.   Genitourinary:  Negative for difficulty urinating, dysuria and hematuria.   Neurological:  Negative for dizziness " and facial asymmetry.     Objective:     Vital Signs (Most Recent):  Temp: 98.1 °F (36.7 °C) (01/31/25 1200)  Pulse: 96 (01/31/25 1446)  Resp: 16 (01/31/25 1200)  BP: (!) 177/77 (01/31/25 1200)  SpO2: 96 % (01/31/25 1200) Vital Signs (24h Range):  Temp:  [97 °F (36.1 °C)-98.3 °F (36.8 °C)] 98.1 °F (36.7 °C)  Pulse:  [] 96  Resp:  [16-19] 16  SpO2:  [91 %-96 %] 96 %  BP: (132-179)/() 177/77     Weight: 131.1 kg (289 lb)  Body mass index is 49.61 kg/m².    Intake/Output Summary (Last 24 hours) at 1/31/2025 1502  Last data filed at 1/31/2025 0623  Gross per 24 hour   Intake --   Output 410 ml   Net -410 ml         Physical Exam  Constitutional:       General: She is not in acute distress.     Appearance: She is ill-appearing. She is not toxic-appearing or diaphoretic.   HENT:      Head: Normocephalic and atraumatic.      Mouth/Throat:      Mouth: Mucous membranes are dry.   Eyes:      General: No scleral icterus.     Extraocular Movements: Extraocular movements intact.   Cardiovascular:      Rate and Rhythm: Normal rate and regular rhythm.      Heart sounds: No murmur heard.     No gallop.   Pulmonary:      Effort: Pulmonary effort is normal.      Breath sounds: Normal breath sounds.   Abdominal:      General: There is no distension.      Palpations: Abdomen is soft.      Tenderness: There is abdominal tenderness. There is no guarding.   Skin:     General: Skin is warm and dry.   Neurological:      Mental Status: She is alert and oriented to person, place, and time.   Psychiatric:         Behavior: Behavior normal.             Significant Labs: All pertinent labs within the past 24 hours have been reviewed.  CBC:   Recent Labs   Lab 01/30/25  1740 01/31/25  0554 01/31/25  0829   WBC 10.57 9.68 9.03   HGB 9.7* 10.2* 10.6*   HCT 28.8* 30.6* 31.7*    177 181     CMP:   Recent Labs   Lab 01/30/25  0334 01/31/25  0554   * 135*   K 3.4* 3.9    110   CO2 18* 20*    94   BUN 20 13    CREATININE 0.9 0.7   CALCIUM 7.7* 8.9   PROT  --  9.8*   ALBUMIN  --  2.4*   BILITOT  --  0.3   ALKPHOS  --  55   AST  --  10   ALT  --  11   ANIONGAP 7* 5*       Significant Imaging: I have reviewed all pertinent imaging results/findings within the past 24 hours.    Assessment and Plan     * Lower abdominal pain  Febrile with abrupt onset of pain to epigastrum, left hypochondrium,and left flank.    Plan:   - Serial abdominal exams  - dicyclomine 10 mg BID  - Norco 5 q4H  - Morphine 2 mg q6H       Gastroenteritis  58 yo F complaining of left upper quadrant abdominal pain and flank pain. Patient reports that symptoms started on 1/29, pain is severe and associated with nausea and vomiting. Initially no diarrhea, but now having loose bowel movements. Febrile to 102.9 and hypotensive to 72/37 in ED. Imaging suggesting infectious or inflammatory enteritis involving the proximal small bowel.    Plan:  - Admit to [facility]  - Continue ceftriaxone and flagyl  - Stool culture  - Fecal calprotectin  - Follow blood cultures         Coffee ground emesis  Patient's hemorrhage is due to gastrointestinal bleed, patient does have a propensity for bleeding due to a medication, the medication is rivaroxabam.. Patients most recent Hgb, Hct, platelets, and INR are listed below.  Recent Labs     01/30/25  1740 01/31/25  0554 01/31/25  0829   HGB 9.7* 10.2* 10.6*   HCT 28.8* 30.6* 31.7*    177 181       Plan  - H/H stable, single episode. No need for EGD.  - Resume rivaroxban           Hypotension  Hypotensive to 72/37 on 1/31.  Resuscitated 4L crystalloid. BP now consistently elevated.     Plan:  - Resume home amlodipine  - Consider adding back home Losartan in AM  - Add back as clinically indicated        VTE Risk Mitigation (From admission, onward)           Ordered     rivaroxaban tablet 20 mg  With dinner         01/31/25 1011     Reason for No Pharmacological VTE Prophylaxis  Once        Question:  Reasons:  Answer:   Already adequately anticoagulated on oral Anticoagulants    01/29/25 1941     IP VTE HIGH RISK PATIENT  Once         01/29/25 1941     Place sequential compression device  Until discontinued         01/29/25 1941                    Discharge Planning   SHIRLEY: 2/2/2025     Code Status: Full Code   Medical Readiness for Discharge Date:   Discharge Plan A: Home with family   Discharge Delays: None known at this time                    Hi Whyte MD  Department of Hospital Medicine   [facility]

## 2025-01-31 NOTE — ASSESSMENT & PLAN NOTE
Hypotensive to 72/37 on 1/31.  Resuscitated 4L crystalloid. BP now consistently elevated.     Plan:  - Resume home amlodipine  - Consider adding back home Losartan in AM  - Add back as clinically indicated

## 2025-01-31 NOTE — PLAN OF CARE
01/31/25 1418   Post-Acute Status   Post-Acute Authorization Other   Coverage [facility] DUAL COMPLETE PPO SNP   Other Status No Post-Acute Service Needs   Discharge Delays None known at this time   Discharge Plan   Discharge Plan A Home with family   Discharge Plan B Home     Sw met with pt at bedside who stated that she is independent and ambulatory with ADL's. Pt stated that at this time there are no needs from case management.    Discharge Plan A and Plan B have been determined by review of patient's clinical status, future medical and therapeutic needs, and coverage/benefits for post-acute care in coordination with multidisciplinary team members.    CAMILO Arthur  Case Management  [phone]

## 2025-02-01 PROBLEM — I95.9 HYPOTENSION: Status: RESOLVED | Noted: 2025-01-30 | Resolved: 2025-02-01

## 2025-02-01 PROBLEM — K92.0 COFFEE GROUND EMESIS: Status: RESOLVED | Noted: 2025-01-30 | Resolved: 2025-02-01

## 2025-02-01 PROBLEM — R77.8 ELEVATED TOTAL PROTEIN: Status: ACTIVE | Noted: 2025-02-01

## 2025-02-01 LAB
ALBUMIN SERPL BCP-MCNC: 2.3 G/DL (ref 3.5–5.2)
ALP SERPL-CCNC: 51 U/L (ref 40–150)
ALT SERPL W/O P-5'-P-CCNC: 11 U/L (ref 10–44)
ANION GAP SERPL CALC-SCNC: 5 MMOL/L (ref 8–16)
AST SERPL-CCNC: 8 U/L (ref 10–40)
BASOPHILS # BLD AUTO: 0.01 K/UL (ref 0–0.2)
BASOPHILS NFR BLD: 0.2 % (ref 0–1.9)
BILIRUB SERPL-MCNC: 0.3 MG/DL (ref 0.1–1)
BUN SERPL-MCNC: 12 MG/DL (ref 6–20)
CALCIUM SERPL-MCNC: 8.9 MG/DL (ref 8.7–10.5)
CHLORIDE SERPL-SCNC: 105 MMOL/L (ref 95–110)
CO2 SERPL-SCNC: 23 MMOL/L (ref 23–29)
CREAT SERPL-MCNC: 0.7 MG/DL (ref 0.5–1.4)
CREAT UR-MCNC: 56 MG/DL (ref 15–325)
DIFFERENTIAL METHOD BLD: ABNORMAL
EOSINOPHIL # BLD AUTO: 0 K/UL (ref 0–0.5)
EOSINOPHIL NFR BLD: 0 % (ref 0–8)
ERYTHROCYTE [DISTWIDTH] IN BLOOD BY AUTOMATED COUNT: 16.5 % (ref 11.5–14.5)
EST. GFR  (NO RACE VARIABLE): >60 ML/MIN/1.73 M^2
GLUCOSE SERPL-MCNC: 80 MG/DL (ref 70–110)
HCT VFR BLD AUTO: 30.9 % (ref 37–48.5)
HGB BLD-MCNC: 10.4 G/DL (ref 12–16)
IMM GRANULOCYTES # BLD AUTO: 0.02 K/UL (ref 0–0.04)
IMM GRANULOCYTES NFR BLD AUTO: 0.3 % (ref 0–0.5)
LYMPHOCYTES # BLD AUTO: 1.1 K/UL (ref 1–4.8)
LYMPHOCYTES NFR BLD: 17.5 % (ref 18–48)
MAGNESIUM SERPL-MCNC: 1.8 MG/DL (ref 1.6–2.6)
MCH RBC QN AUTO: 31.2 PG (ref 27–31)
MCHC RBC AUTO-ENTMCNC: 33.7 G/DL (ref 32–36)
MCV RBC AUTO: 93 FL (ref 82–98)
MONOCYTES # BLD AUTO: 0.6 K/UL (ref 0.3–1)
MONOCYTES NFR BLD: 9.2 % (ref 4–15)
NEUTROPHILS # BLD AUTO: 4.4 K/UL (ref 1.8–7.7)
NEUTROPHILS NFR BLD: 72.8 % (ref 38–73)
NRBC BLD-RTO: 0 /100 WBC
PLATELET # BLD AUTO: 187 K/UL (ref 150–450)
PMV BLD AUTO: 12.8 FL (ref 9.2–12.9)
POTASSIUM SERPL-SCNC: 3.4 MMOL/L (ref 3.5–5.1)
PROT SERPL-MCNC: 9.4 G/DL (ref 6–8.4)
PROT UR-MCNC: 10 MG/DL (ref 0–15)
PROT/CREAT UR: 0.18 MG/G{CREAT} (ref 0–0.2)
RBC # BLD AUTO: 3.33 M/UL (ref 4–5.4)
RV AG STL QL IA.RAPID: NEGATIVE
SODIUM SERPL-SCNC: 133 MMOL/L (ref 136–145)
WBC # BLD AUTO: 6.01 K/UL (ref 3.9–12.7)
WBC #/AREA STL HPF: NORMAL /[HPF]

## 2025-02-01 PROCEDURE — 80053 COMPREHEN METABOLIC PANEL: CPT

## 2025-02-01 PROCEDURE — 82570 ASSAY OF URINE CREATININE: CPT

## 2025-02-01 PROCEDURE — 11000001 HC ACUTE MED/SURG PRIVATE ROOM

## 2025-02-01 PROCEDURE — 85025 COMPLETE CBC W/AUTO DIFF WBC: CPT

## 2025-02-01 PROCEDURE — 63600175 PHARM REV CODE 636 W HCPCS

## 2025-02-01 PROCEDURE — 25000003 PHARM REV CODE 250

## 2025-02-01 PROCEDURE — 25000003 PHARM REV CODE 250: Performed by: PHYSICIAN ASSISTANT

## 2025-02-01 PROCEDURE — 36415 COLL VENOUS BLD VENIPUNCTURE: CPT

## 2025-02-01 PROCEDURE — 83735 ASSAY OF MAGNESIUM: CPT

## 2025-02-01 PROCEDURE — A4216 STERILE WATER/SALINE, 10 ML: HCPCS | Performed by: PHYSICIAN ASSISTANT

## 2025-02-01 PROCEDURE — 63600175 PHARM REV CODE 636 W HCPCS: Performed by: PHYSICIAN ASSISTANT

## 2025-02-01 RX ORDER — CARVEDILOL 12.5 MG/1
12.5 TABLET ORAL 2 TIMES DAILY
Status: DISCONTINUED | OUTPATIENT
Start: 2025-02-01 | End: 2025-02-02 | Stop reason: HOSPADM

## 2025-02-01 RX ORDER — LOSARTAN POTASSIUM 50 MG/1
100 TABLET ORAL DAILY
Status: DISCONTINUED | OUTPATIENT
Start: 2025-02-01 | End: 2025-02-02 | Stop reason: HOSPADM

## 2025-02-01 RX ORDER — HYDROCODONE BITARTRATE AND ACETAMINOPHEN 5; 325 MG/1; MG/1
1 TABLET ORAL EVERY 8 HOURS PRN
Status: DISCONTINUED | OUTPATIENT
Start: 2025-02-01 | End: 2025-02-02 | Stop reason: HOSPADM

## 2025-02-01 RX ORDER — TIAGABINE HYDROCHLORIDE 4 MG/1
4 TABLET, FILM COATED ORAL NIGHTLY
Status: DISCONTINUED | OUTPATIENT
Start: 2025-02-01 | End: 2025-02-02 | Stop reason: HOSPADM

## 2025-02-01 RX ADMIN — SUCRALFATE 1 G: 1 TABLET ORAL at 11:02

## 2025-02-01 RX ADMIN — DICYCLOMINE HYDROCHLORIDE 10 MG: 10 CAPSULE ORAL at 09:02

## 2025-02-01 RX ADMIN — METRONIDAZOLE 500 MG: 500 INJECTION, SOLUTION INTRAVENOUS at 01:02

## 2025-02-01 RX ADMIN — CARVEDILOL 12.5 MG: 12.5 TABLET, FILM COATED ORAL at 08:02

## 2025-02-01 RX ADMIN — METRONIDAZOLE 500 MG: 500 INJECTION, SOLUTION INTRAVENOUS at 09:02

## 2025-02-01 RX ADMIN — CARVEDILOL 12.5 MG: 12.5 TABLET, FILM COATED ORAL at 09:02

## 2025-02-01 RX ADMIN — AMLODIPINE BESYLATE 5 MG: 5 TABLET ORAL at 08:02

## 2025-02-01 RX ADMIN — SODIUM CHLORIDE, PRESERVATIVE FREE 10 ML: 5 INJECTION INTRAVENOUS at 01:02

## 2025-02-01 RX ADMIN — HYDROCODONE BITARTRATE AND ACETAMINOPHEN 1 TABLET: 5; 325 TABLET ORAL at 09:02

## 2025-02-01 RX ADMIN — ATORVASTATIN CALCIUM 80 MG: 40 TABLET, FILM COATED ORAL at 09:02

## 2025-02-01 RX ADMIN — SUCRALFATE 1 G: 1 TABLET ORAL at 09:02

## 2025-02-01 RX ADMIN — LOSARTAN POTASSIUM 100 MG: 50 TABLET, FILM COATED ORAL at 08:02

## 2025-02-01 RX ADMIN — SUCRALFATE 1 G: 1 TABLET ORAL at 06:02

## 2025-02-01 RX ADMIN — RIVAROXABAN 20 MG: 20 TABLET, FILM COATED ORAL at 05:02

## 2025-02-01 RX ADMIN — CYANOCOBALAMIN TAB 1000 MCG 1000 MCG: 1000 TAB at 08:02

## 2025-02-01 RX ADMIN — DICYCLOMINE HYDROCHLORIDE 10 MG: 10 CAPSULE ORAL at 08:02

## 2025-02-01 RX ADMIN — METRONIDAZOLE 500 MG: 500 INJECTION, SOLUTION INTRAVENOUS at 06:02

## 2025-02-01 RX ADMIN — SUCRALFATE 1 G: 1 TABLET ORAL at 05:02

## 2025-02-01 RX ADMIN — POTASSIUM BICARBONATE 40 MEQ: 391 TABLET, EFFERVESCENT ORAL at 08:02

## 2025-02-01 RX ADMIN — PANTOPRAZOLE SODIUM 40 MG: 40 TABLET, DELAYED RELEASE ORAL at 08:02

## 2025-02-01 NOTE — ASSESSMENT & PLAN NOTE
58 yo F complaining of left upper quadrant abdominal pain and flank pain. Patient reports that symptoms started on 1/29, pain is severe and associated with nausea and vomiting. Initially no diarrhea, but now having loose bowel movements. Febrile to 102.9 and hypotensive to 72/37 in ED. Imaging suggesting infectious or inflammatory enteritis involving the proximal small bowel.    Plan:  - Admit to [facility]  - Continue ceftriaxone and flagyl  - Stool culture  - Fecal calprotectin  - Follow blood cultures  - GI Pathogen PCR Panel

## 2025-02-01 NOTE — PROGRESS NOTES
"[facility]  Progress Note    Patient Name: Amna Chwala  MRN: [id number]  Patient Class: IP- Inpatient   Admission Date: 1/29/2025  Length of Stay: 2 days  Attending Physician: Long Miller MD  Primary Care Provider: Tiffany Mina MD        Subjective     Principal Problem:Lower abdominal pain        HPI:  Amna Chawla is a 59 y.o. F w/ a PMH s/f of cardiac arrest (s/p AICD Feb 2013), stroke with anoxic brain injury, a-fib, CRT, migraine, and hypertension who presents with left upper quadrant pain and left flank pain. She reports the pain began yesterday morning around 530am. She reports this pain is unlike anything she has experienced before. The pain has not been controlled with the NSADs takes at home.  She vomited "clear liquid" two times before she arrived in the ED, twice in the ED, and once this morning. Her most recent episode of emesis was coffee-ground of approximately 25 ml. She endorses diarrhea that began this morning. She also endorses cough, headache, and heartburn.    In the ED VS notable for a triage T 102.9 and hypotension 72/37 at one point. Labs remarkable for Hgb drop from 11 -> 9 and K 3.4. Imaging remarkable for findings suggesting infectious or inflammatory enteritis involving the proximal small bowel.    Admit to [facility] MED 5     Overview/Hospital Course:  Febrile with N/V and diarrhea at . Had a single episode of coffee-ground emesis on 1/30, but H/H stable. No need for EGD. Pain and nausea better on 2/1.    Interval History: Patient reports improving abdominal pain, states she slept better overnight. One episode of green vomitus around 3am. Continuing dark green diarrhea. Reports her pain is mostly only in LLQ now but that she occasionally has a sharp midline pain that comes and goes.     Review of Systems   Constitutional:  Negative for chills and fever.   Respiratory:  Negative for cough, choking and shortness of breath.    Cardiovascular:  Negative for " "palpitations and leg swelling.   Gastrointestinal:  Positive for abdominal pain, diarrhea, nausea and vomiting. Negative for abdominal distention and blood in stool.   Genitourinary:  Negative for difficulty urinating, dysuria and hematuria.   Neurological:  Negative for dizziness and facial asymmetry.     Objective:     Vital Signs (Most Recent):  Temp: 98 °F (36.7 °C) (02/01/25 1114)  Pulse: 96 (02/01/25 1114)  Resp: 18 (02/01/25 0742)  BP: (!) 176/99 (02/01/25 1114)  SpO2: 98 % (02/01/25 1300) Vital Signs (24h Range):  Temp:  [98 °F (36.7 °C)-98.5 °F (36.9 °C)] 98 °F (36.7 °C)  Pulse:  [91-97] 96  Resp:  [17-18] 18  SpO2:  [93 %-98 %] 98 %  BP: (160-187)/() 176/99     Weight: 131.1 kg (289 lb)  Body mass index is 49.61 kg/m².    Intake/Output Summary (Last 24 hours) at 2/1/2025 1345  Last data filed at 2/1/2025 0726  Gross per 24 hour   Intake --   Output 850 ml   Net -850 ml         Physical Exam  Vitals reviewed.   Constitutional:       General: She is not in acute distress.     Appearance: She is not ill-appearing, toxic-appearing or diaphoretic.   HENT:      Head: Normocephalic and atraumatic.      Mouth/Throat:      Mouth: Mucous membranes are dry.   Eyes:      General: No scleral icterus.     Extraocular Movements: Extraocular movements intact.   Cardiovascular:      Rate and Rhythm: Normal rate and regular rhythm.      Heart sounds: No murmur heard.     No gallop.   Pulmonary:      Effort: Pulmonary effort is normal.      Breath sounds: Normal breath sounds.   Abdominal:      General: There is no distension.      Palpations: Abdomen is soft.      Tenderness: There is abdominal tenderness. There is no guarding.   Skin:     General: Skin is warm and dry.   Neurological:      Mental Status: She is alert and oriented to person, place, and time.   Psychiatric:         Behavior: Behavior normal.             Significant Labs: Blood Culture: No results for input(s): "LABBLOO" in the last 48 hours.  CBC: "   Recent Labs   Lab 01/31/25  0554 01/31/25  0829 02/01/25  0328   WBC 9.68 9.03 6.01   HGB 10.2* 10.6* 10.4*   HCT 30.6* 31.7* 30.9*    181 187     CMP:   Recent Labs   Lab 01/31/25  0554 02/01/25  0328   * 133*   K 3.9 3.4*    105   CO2 20* 23   GLU 94 80   BUN 13 12   CREATININE 0.7 0.7   CALCIUM 8.9 8.9   PROT 9.8* 9.4*   ALBUMIN 2.4* 2.3*   BILITOT 0.3 0.3   ALKPHOS 55 51   AST 10 8*   ALT 11 11   ANIONGAP 5* 5*       Significant Imaging: I have reviewed all pertinent imaging results/findings within the past 24 hours.    Assessment and Plan     * Lower abdominal pain  Febrile with abrupt onset of pain to epigastrum, left hypochondrium,and left flank.   Improved as of 2/1/25    Plan:   - Serial abdominal exams  - dicyclomine 10 mg BID  - Norco 5 q8H        Gastroenteritis  60 yo F complaining of left upper quadrant abdominal pain and flank pain. Patient reports that symptoms started on 1/29, pain is severe and associated with nausea and vomiting. Initially no diarrhea, but now having loose bowel movements. Febrile to 102.9 and hypotensive to 72/37 in ED. Imaging suggesting infectious or inflammatory enteritis involving the proximal small bowel.    Plan:  - Admit to [facility]  - Continue ceftriaxone and flagyl  - Stool culture  - Fecal calprotectin  - Follow blood cultures  - GI Pathogen PCR Panel         Elevated total protein  Persistently elevated gamma gap dating back to 2018, with a TP 9.7 & Alb 2.8 in the setting h/o DVT, embolic stroke, and CVA requiring chronic anticoagulation. Differentials include nephrotic syndrome, multiple myeloma, and Crohn's disease.    Plan:  - Urine protein/creatine ratio  - SPEP  - Serum free light chains  - Immunofixation electrophoresis          VTE Risk Mitigation (From admission, onward)           Ordered     rivaroxaban tablet 20 mg  With dinner         01/31/25 1011     Reason for No Pharmacological VTE Prophylaxis  Once        Question:  Reasons:   Answer:  Already adequately anticoagulated on oral Anticoagulants    01/29/25 1941     IP VTE HIGH RISK PATIENT  Once         01/29/25 1941     Place sequential compression device  Until discontinued         01/29/25 1941                    Discharge Planning   SHIRLEY: 2/2/2025     Code Status: Full Code   Medical Readiness for Discharge Date:   Discharge Plan A: Home with family   Discharge Delays: None known at this time                    Hi Whyte MD  Department of Hospital Medicine   [facility]

## 2025-02-01 NOTE — ASSESSMENT & PLAN NOTE
Patient's hemorrhage is due to gastrointestinal bleed, patient does have a propensity for bleeding due to a medication, the medication is rivaroxabam.. Patients most recent Hgb, Hct, platelets, and INR are listed below.  Recent Labs     01/31/25  0554 01/31/25  0829 02/01/25  0328   HGB 10.2* 10.6* 10.4*   HCT 30.6* 31.7* 30.9*    181 187       Plan  - H/H stable, single episode. No need for EGD.  - Resume rivaroxban

## 2025-02-01 NOTE — ASSESSMENT & PLAN NOTE
Persistently elevated gamma gap dating back to 2018, with a TP 9.7 & Alb 2.8 in the setting h/o DVT, embolic stroke, and CVA requiring chronic anticoagulation. Differentials include nephrotic syndrome, multiple myeloma, and Crohn's disease.    Plan:  - Urine protein/creatine ratio  - SPEP  - Serum free light chains  - Immunofixation electrophoresis

## 2025-02-01 NOTE — ASSESSMENT & PLAN NOTE
Febrile with abrupt onset of pain to epigastrum, left hypochondrium,and left flank.   Improved as of 2/1/25    Plan:   - Serial abdominal exams  - dicyclomine 10 mg BID  - Norco 5 q8H

## 2025-02-01 NOTE — SUBJECTIVE & OBJECTIVE
Interval History: Patient reports improving abdominal pain, states she slept better overnight. One episode of green vomitus around 3am. Continuing dark green diarrhea. Reports her pain is mostly only in LLQ now but that she occasionally has a sharp midline pain that comes and goes.     Review of Systems   Constitutional:  Negative for chills and fever.   Respiratory:  Negative for cough, choking and shortness of breath.    Cardiovascular:  Negative for palpitations and leg swelling.   Gastrointestinal:  Positive for abdominal pain, diarrhea, nausea and vomiting. Negative for abdominal distention and blood in stool.   Genitourinary:  Negative for difficulty urinating, dysuria and hematuria.   Neurological:  Negative for dizziness and facial asymmetry.     Objective:     Vital Signs (Most Recent):  Temp: 98 °F (36.7 °C) (02/01/25 1114)  Pulse: 96 (02/01/25 1114)  Resp: 18 (02/01/25 0742)  BP: (!) 176/99 (02/01/25 1114)  SpO2: 98 % (02/01/25 1300) Vital Signs (24h Range):  Temp:  [98 °F (36.7 °C)-98.5 °F (36.9 °C)] 98 °F (36.7 °C)  Pulse:  [91-97] 96  Resp:  [17-18] 18  SpO2:  [93 %-98 %] 98 %  BP: (160-187)/() 176/99     Weight: 131.1 kg (289 lb)  Body mass index is 49.61 kg/m².    Intake/Output Summary (Last 24 hours) at 2/1/2025 1345  Last data filed at 2/1/2025 0726  Gross per 24 hour   Intake --   Output 850 ml   Net -850 ml         Physical Exam  Vitals reviewed.   Constitutional:       General: She is not in acute distress.     Appearance: She is not ill-appearing, toxic-appearing or diaphoretic.   HENT:      Head: Normocephalic and atraumatic.      Mouth/Throat:      Mouth: Mucous membranes are dry.   Eyes:      General: No scleral icterus.     Extraocular Movements: Extraocular movements intact.   Cardiovascular:      Rate and Rhythm: Normal rate and regular rhythm.      Heart sounds: No murmur heard.     No gallop.   Pulmonary:      Effort: Pulmonary effort is normal.      Breath sounds: Normal breath  "sounds.   Abdominal:      General: There is no distension.      Palpations: Abdomen is soft.      Tenderness: There is abdominal tenderness. There is no guarding.   Skin:     General: Skin is warm and dry.   Neurological:      Mental Status: She is alert and oriented to person, place, and time.   Psychiatric:         Behavior: Behavior normal.             Significant Labs: Blood Culture: No results for input(s): "LABBLOO" in the last 48 hours.  CBC:   Recent Labs   Lab 01/31/25  0554 01/31/25  0829 02/01/25  0328   WBC 9.68 9.03 6.01   HGB 10.2* 10.6* 10.4*   HCT 30.6* 31.7* 30.9*    181 187     CMP:   Recent Labs   Lab 01/31/25  0554 02/01/25  0328   * 133*   K 3.9 3.4*    105   CO2 20* 23   GLU 94 80   BUN 13 12   CREATININE 0.7 0.7   CALCIUM 8.9 8.9   PROT 9.8* 9.4*   ALBUMIN 2.4* 2.3*   BILITOT 0.3 0.3   ALKPHOS 55 51   AST 10 8*   ALT 11 11   ANIONGAP 5* 5*       Significant Imaging: I have reviewed all pertinent imaging results/findings within the past 24 hours.  "

## 2025-02-01 NOTE — MEDICAL/APP STUDENT
[facility] Medicine Student   Progress Note  [facility] MED 5    Admit Date: 1/29/2025  Hospital Day: 2  02/01/2025  8:53 AM    SUBJECTIVE:   Ms. Amna Chawla is a 59 y.o. female with a medical history of cardiac arrest, stroke, a-fib, HTN who presented with LUQ, LLQ pain that began Wednesday morning and a few episodes of coffee-ground emesis who is being followed up for Lower abdominal pain.     Interval history: Patient reports improving abdominal pain, states she slept better overnight. One episode of green vomitus around 3am. Continuing dark green diarrhea. Reports her pain is mostly only in LLQ now but that she occasionally has a sharp midline pain that comes and goes.    Today she noted she does have a family history of ulcers in her father.    Review of Systems   Constitutional:  Negative for chills and fever.   HENT:  Negative for hearing loss.    Eyes: Negative.    Respiratory:  Negative for cough and sputum production.    Cardiovascular:  Negative for chest pain, palpitations and leg swelling.   Gastrointestinal:  Positive for abdominal pain, diarrhea and vomiting. Negative for heartburn.   Genitourinary:  Negative for dysuria and urgency.   Musculoskeletal:  Negative for myalgias and neck pain.   Skin:  Negative for rash.   Neurological:  Negative for dizziness and headaches.   Endo/Heme/Allergies: Negative.    Psychiatric/Behavioral: Negative.         Please refer to the H&P for past medical, family, and social history.    OBJECTIVE:     Vital Signs Recent:  Temp: 98.1 °F (36.7 °C) (02/01/25 0742)  Pulse: 92 (02/01/25 0742)  Resp: 18 (02/01/25 0520)  BP: (!) 164/78 (02/01/25 0742)  SpO2: 97 % (02/01/25 0742)  Oxygen Documentation:                Device (Oxygen Therapy): room air         Vital Signs Range (Last 24H):  Temp:  [98.1 °F (36.7 °C)-98.5 °F (36.9 °C)]   Pulse:  [91-97]   Resp:  [16-18]   BP: (160-187)/()   SpO2:  [93 %-97 %]        I & O (Last 24H):  Intake/Output Summary (Last 24 hours)  at 2/1/2025 0853  Last data filed at 2/1/2025 0726  Gross per 24 hour   Intake --   Output 850 ml   Net -850 ml        Physical Exam:  Physical Exam  Constitutional:       Appearance: She is obese.   HENT:      Head: Normocephalic.      Mouth/Throat:      Mouth: Mucous membranes are moist.      Pharynx: Oropharynx is clear.   Cardiovascular:      Rate and Rhythm: Normal rate and regular rhythm.      Pulses: Normal pulses.      Heart sounds: Normal heart sounds.   Pulmonary:      Effort: Pulmonary effort is normal.      Breath sounds: Normal breath sounds.   Abdominal:      Palpations: Abdomen is soft.      Tenderness: There is abdominal tenderness.   Musculoskeletal:      Right lower leg: No edema.      Left lower leg: No edema.   Skin:     General: Skin is warm and dry.   Neurological:      General: No focal deficit present.      Mental Status: She is alert.         Labs:   Recent Labs   Lab 01/30/25  0334 01/31/25  0554 02/01/25  0328   * 135* 133*   K 3.4* 3.9 3.4*    110 105   CO2 18* 20* 23   BUN 20 13 12   CREATININE 0.9 0.7 0.7    94 80   CALCIUM 7.7* 8.9 8.9   MG 1.5* 2.0 1.8     Recent Labs   Lab 01/29/25  1302 01/31/25  0554 02/01/25  0328   ALKPHOS 66 55 51   ALT 15 11 11   AST 14 10 8*   ALBUMIN 2.9* 2.4* 2.3*   PROT 10.8* 9.8* 9.4*   BILITOT 0.7 0.3 0.3     Recent Labs   Lab 01/31/25  0554 01/31/25  0829 02/01/25  0328   WBC 9.68 9.03 6.01   HGB 10.2* 10.6* 10.4*   HCT 30.6* 31.7* 30.9*    181 187       Diagnostic Results:  Awaiting stool cultures    Scheduled Meds:   amLODIPine  5 mg Oral Daily    atorvastatin  80 mg Oral QHS    carvediloL  12.5 mg Oral BID    cefTRIAXone (Rocephin) IV (PEDS and ADULTS)  2 g Intravenous Q24H    cyanocobalamin  1,000 mcg Oral Daily    dicyclomine  10 mg Oral BID    losartan  100 mg Oral Daily    metroNIDAZOLE IV (PEDS and ADULTS)  500 mg Intravenous Q8H    pantoprazole  40 mg Oral Daily    rivaroxaban  20 mg Oral Daily with dinner    sodium  chloride 0.9%  10 mL Intravenous Q8H    sucralfate  1 g Oral QID (AC & HS)     Continuous Infusions:  PRN Meds:  Current Facility-Administered Medications:     albuterol-ipratropium, 3 mL, Nebulization, Q6H PRN    calcium carbonate, 500 mg, Oral, TID PRN    dextrose 50%, 12.5 g, Intravenous, PRN    dextrose 50%, 25 g, Intravenous, PRN    glucagon (human recombinant), 1 mg, Intramuscular, PRN    glucose, 16 g, Oral, PRN    glucose, 24 g, Oral, PRN    HYDROcodone-acetaminophen, 1 tablet, Oral, Q4H PRN    iohexoL, 100 mL, Intravenous, ONCE PRN    morphine, 2 mg, Intravenous, Q6H PRN    naloxone, 0.02 mg, Intravenous, PRN    polyethylene glycol, 17 g, Oral, Daily PRN    prochlorperazine, 5 mg, Intravenous, Q6H PRN    simethicone, 1 tablet, Oral, QID PRN    zolpidem, 5 mg, Oral, Nightly PRN    ASSESSMENT/PLAN:   Amna Chawla is a 59 y.o. female with a relevant medical history of cardiac arrest (s/p AICD Feb 2013), stroke with anoxic brain injury, a-fib, and hypertension who is being treated for Lower abdominal pain.    Active Hospital Problems    Diagnosis  POA    *Lower abdominal pain [R10.30]  Yes    Coffee ground emesis [K92.0]  No    Hypotension [I95.9]  Yes    Gastroenteritis [K52.9]  Yes      Resolved Hospital Problems    Diagnosis Date Resolved POA    Fever [R50.9] 01/31/2025 Yes     Lower Abdominal Pain with Presumed Resolved GI Bleed  Patient presented with significant abdominal pain in the left upper quadrant, mid-abdomen, and left flank. She had 5 episodes of clear emesis and 1 episode of 25ml of coffee-ground emesis. CT abdo/pelvis consistent with possible enteritis, possible GI bleed possibly secondary to this. She has a history of anticoagulants and NSAID use for her migraine, which could contribute to ulcer formation. Hx of normocytic anemia running from 10-11 according to a hematology note from 2023.     PLAN:  - CBC/CMP daily  - Continue oral 40mg BID  - If Hgb drops below 7g/dL, consider  transfusion  - Consider antacids for symptomatic relief  - Advance diet as tolerated  - Tylenol PRN for pain     Enteritis  Patient presented with left-sided abdominal pain and fever of 102.2. CT showed infectious/inflammatory process. Does not meet SIRS criteria.     PLAN:  - Continuing ceftriaxone and metronidazole  - CBC daily  - Awaiting stool cultures    A-Fib  Patient taking Xarelto at home. [id number] Score - 5 (stroke risk 7.2% per year and 10% risk of stroke)              Calculated from: Age <65, Sex F, CHF No, HTN Yes, Stroke Yes, Vascular disease Yes     PLAN:  - Continue Xarelto.     Cardiac Arrest with ICD  Patient reports no issues with chest pain. Patient reports not taking Coreg at home, despite records indicating she picked up the medication. Blood pressure readings to the 170s systolic today, possibly secondary to pain but likely due to cessation of home medications during hospital stay.     PLAN:  - Continue home amlodipine.  - Restarted on Coreg    Discharge Planning  Pending tolerating advancing diet and pain.     Shanda Boyle, MS4  [facility]  [place]

## 2025-02-02 VITALS
OXYGEN SATURATION: 96 % | RESPIRATION RATE: 18 BRPM | TEMPERATURE: 98 F | DIASTOLIC BLOOD PRESSURE: 77 MMHG | HEART RATE: 70 BPM | WEIGHT: 289 LBS | HEIGHT: 64 IN | BODY MASS INDEX: 49.34 KG/M2 | SYSTOLIC BLOOD PRESSURE: 140 MMHG

## 2025-02-02 LAB
ALBUMIN SERPL BCP-MCNC: 2.4 G/DL (ref 3.5–5.2)
ALP SERPL-CCNC: 43 U/L (ref 40–150)
ALT SERPL W/O P-5'-P-CCNC: 11 U/L (ref 10–44)
ANION GAP SERPL CALC-SCNC: 6 MMOL/L (ref 8–16)
AST SERPL-CCNC: 14 U/L (ref 10–40)
BASOPHILS # BLD AUTO: 0 K/UL (ref 0–0.2)
BASOPHILS NFR BLD: 0 % (ref 0–1.9)
BILIRUB SERPL-MCNC: 0.3 MG/DL (ref 0.1–1)
BUN SERPL-MCNC: 11 MG/DL (ref 6–20)
CALCIUM SERPL-MCNC: 8.7 MG/DL (ref 8.7–10.5)
CHLORIDE SERPL-SCNC: 104 MMOL/L (ref 95–110)
CO2 SERPL-SCNC: 24 MMOL/L (ref 23–29)
CREAT SERPL-MCNC: 0.7 MG/DL (ref 0.5–1.4)
DIFFERENTIAL METHOD BLD: ABNORMAL
EOSINOPHIL # BLD AUTO: 0 K/UL (ref 0–0.5)
EOSINOPHIL NFR BLD: 0 % (ref 0–8)
ERYTHROCYTE [DISTWIDTH] IN BLOOD BY AUTOMATED COUNT: 15.8 % (ref 11.5–14.5)
EST. GFR  (NO RACE VARIABLE): >60 ML/MIN/1.73 M^2
GLUCOSE SERPL-MCNC: 82 MG/DL (ref 70–110)
HCT VFR BLD AUTO: 29.6 % (ref 37–48.5)
HGB BLD-MCNC: 10.3 G/DL (ref 12–16)
IMM GRANULOCYTES # BLD AUTO: 0.02 K/UL (ref 0–0.04)
IMM GRANULOCYTES NFR BLD AUTO: 0.6 % (ref 0–0.5)
LYMPHOCYTES # BLD AUTO: 0.7 K/UL (ref 1–4.8)
LYMPHOCYTES NFR BLD: 21.5 % (ref 18–48)
MAGNESIUM SERPL-MCNC: 1.7 MG/DL (ref 1.6–2.6)
MCH RBC QN AUTO: 31.8 PG (ref 27–31)
MCHC RBC AUTO-ENTMCNC: 34.8 G/DL (ref 32–36)
MCV RBC AUTO: 91 FL (ref 82–98)
MONOCYTES # BLD AUTO: 0.5 K/UL (ref 0.3–1)
MONOCYTES NFR BLD: 17 % (ref 4–15)
NEUTROPHILS # BLD AUTO: 1.9 K/UL (ref 1.8–7.7)
NEUTROPHILS NFR BLD: 60.9 % (ref 38–73)
NRBC BLD-RTO: 0 /100 WBC
PLATELET # BLD AUTO: 199 K/UL (ref 150–450)
PMV BLD AUTO: 12.3 FL (ref 9.2–12.9)
POTASSIUM SERPL-SCNC: 3.1 MMOL/L (ref 3.5–5.1)
PROT SERPL-MCNC: 9 G/DL (ref 6–8.4)
RBC # BLD AUTO: 3.24 M/UL (ref 4–5.4)
SODIUM SERPL-SCNC: 134 MMOL/L (ref 136–145)
WBC # BLD AUTO: 3.11 K/UL (ref 3.9–12.7)

## 2025-02-02 PROCEDURE — 63600175 PHARM REV CODE 636 W HCPCS

## 2025-02-02 PROCEDURE — 83521 IG LIGHT CHAINS FREE EACH: CPT | Mod: 59

## 2025-02-02 PROCEDURE — 85025 COMPLETE CBC W/AUTO DIFF WBC: CPT

## 2025-02-02 PROCEDURE — 86334 IMMUNOFIX E-PHORESIS SERUM: CPT | Mod: 26,,, | Performed by: PATHOLOGY

## 2025-02-02 PROCEDURE — 36415 COLL VENOUS BLD VENIPUNCTURE: CPT

## 2025-02-02 PROCEDURE — 63600175 PHARM REV CODE 636 W HCPCS: Performed by: INTERNAL MEDICINE

## 2025-02-02 PROCEDURE — 25000003 PHARM REV CODE 250

## 2025-02-02 PROCEDURE — 83735 ASSAY OF MAGNESIUM: CPT

## 2025-02-02 PROCEDURE — A4216 STERILE WATER/SALINE, 10 ML: HCPCS | Performed by: PHYSICIAN ASSISTANT

## 2025-02-02 PROCEDURE — 84165 PROTEIN E-PHORESIS SERUM: CPT

## 2025-02-02 PROCEDURE — 84165 PROTEIN E-PHORESIS SERUM: CPT | Mod: 26,,, | Performed by: PATHOLOGY

## 2025-02-02 PROCEDURE — 80053 COMPREHEN METABOLIC PANEL: CPT

## 2025-02-02 PROCEDURE — 25000003 PHARM REV CODE 250: Performed by: PHYSICIAN ASSISTANT

## 2025-02-02 PROCEDURE — 86334 IMMUNOFIX E-PHORESIS SERUM: CPT

## 2025-02-02 RX ORDER — POTASSIUM CHLORIDE 7.45 MG/ML
10 INJECTION INTRAVENOUS
Status: COMPLETED | OUTPATIENT
Start: 2025-02-02 | End: 2025-02-02

## 2025-02-02 RX ORDER — TIAGABINE HYDROCHLORIDE 4 MG/1
4 TABLET, FILM COATED ORAL NIGHTLY
Qty: 30 TABLET | Refills: 12 | Status: SHIPPED | OUTPATIENT
Start: 2025-02-02

## 2025-02-02 RX ORDER — ONDANSETRON 4 MG/1
8 TABLET, ORALLY DISINTEGRATING ORAL EVERY 6 HOURS PRN
Qty: 20 TABLET | Refills: 0 | Status: SHIPPED | OUTPATIENT
Start: 2025-02-02

## 2025-02-02 RX ORDER — CARVEDILOL 12.5 MG/1
12.5 TABLET ORAL 2 TIMES DAILY
Qty: 180 TABLET | Refills: 3 | Status: SHIPPED | OUTPATIENT
Start: 2025-02-02 | End: 2026-02-02

## 2025-02-02 RX ORDER — LANOLIN ALCOHOL/MO/W.PET/CERES
1000 CREAM (GRAM) TOPICAL DAILY
Qty: 30 TABLET | Refills: 11 | Status: CANCELLED | OUTPATIENT
Start: 2025-02-03

## 2025-02-02 RX ORDER — DICYCLOMINE HYDROCHLORIDE 10 MG/1
10 CAPSULE ORAL 2 TIMES DAILY PRN
Qty: 14 CAPSULE | Refills: 0 | Status: SHIPPED | OUTPATIENT
Start: 2025-02-02 | End: 2025-02-26 | Stop reason: SDUPTHER

## 2025-02-02 RX ADMIN — POTASSIUM CHLORIDE 10 MEQ: 7.46 INJECTION, SOLUTION INTRAVENOUS at 09:02

## 2025-02-02 RX ADMIN — SUCRALFATE 1 G: 1 TABLET ORAL at 03:02

## 2025-02-02 RX ADMIN — SODIUM CHLORIDE, PRESERVATIVE FREE 10 ML: 5 INJECTION INTRAVENOUS at 01:02

## 2025-02-02 RX ADMIN — SUCRALFATE 1 G: 1 TABLET ORAL at 12:02

## 2025-02-02 RX ADMIN — SUCRALFATE 1 G: 1 TABLET ORAL at 06:02

## 2025-02-02 RX ADMIN — AMLODIPINE BESYLATE 5 MG: 5 TABLET ORAL at 09:02

## 2025-02-02 RX ADMIN — PANTOPRAZOLE SODIUM 40 MG: 40 TABLET, DELAYED RELEASE ORAL at 09:02

## 2025-02-02 RX ADMIN — SODIUM CHLORIDE, PRESERVATIVE FREE 10 ML: 5 INJECTION INTRAVENOUS at 06:02

## 2025-02-02 RX ADMIN — POTASSIUM CHLORIDE 10 MEQ: 7.46 INJECTION, SOLUTION INTRAVENOUS at 10:02

## 2025-02-02 RX ADMIN — METRONIDAZOLE 500 MG: 500 INJECTION, SOLUTION INTRAVENOUS at 06:02

## 2025-02-02 RX ADMIN — POTASSIUM BICARBONATE 25 MEQ: 978 TABLET, EFFERVESCENT ORAL at 09:02

## 2025-02-02 RX ADMIN — DICYCLOMINE HYDROCHLORIDE 10 MG: 10 CAPSULE ORAL at 09:02

## 2025-02-02 RX ADMIN — CEFTRIAXONE 2 G: 2 INJECTION, POWDER, FOR SOLUTION INTRAMUSCULAR; INTRAVENOUS at 01:02

## 2025-02-02 RX ADMIN — CARVEDILOL 12.5 MG: 12.5 TABLET, FILM COATED ORAL at 09:02

## 2025-02-02 RX ADMIN — LOSARTAN POTASSIUM 100 MG: 50 TABLET, FILM COATED ORAL at 09:02

## 2025-02-02 RX ADMIN — CYANOCOBALAMIN TAB 1000 MCG 1000 MCG: 1000 TAB at 09:02

## 2025-02-02 RX ADMIN — POTASSIUM BICARBONATE 25 MEQ: 978 TABLET, EFFERVESCENT ORAL at 03:02

## 2025-02-02 NOTE — ASSESSMENT & PLAN NOTE
Persistently elevated gamma gap dating back to 2018, with a TP 9.7 & Alb 2.8 in the setting h/o DVT, embolic stroke, and CVA requiring chronic anticoagulation. Differentials include nephrotic syndrome, multiple myeloma, and Crohn's disease.    Plan:  - Will arrange outpatient follow-up for results of below testing  - SPEP  - Serum free light chains  - Immunofixation electrophoresis

## 2025-02-02 NOTE — ASSESSMENT & PLAN NOTE
58 yo F complaining of left upper quadrant abdominal pain and flank pain. Patient reports that symptoms started on 1/29, pain is severe and associated with nausea and vomiting. Initially no diarrhea, but now having loose bowel movements. Febrile to 102.9 and hypotensive to 72/37 in ED. Imaging suggesting infectious or inflammatory enteritis involving the proximal small bowel.    Plan:  - Given improvement in her symptoms will stop IV antibiotics and discharge. Patient to follow-up w/ PCP to ensure symptom resolution  - Stop ceftriaxone and flagyl  - Stool culture NGTD  - Fecal calprotectin pending  - Bcx NGTD  - GI Pathogen PCR Panel pending

## 2025-02-02 NOTE — PLAN OF CARE
Problem: Adult Inpatient Plan of Care  Goal: Plan of Care Review  Outcome: Adequate for Care Transition  Goal: Patient-Specific Goal (Individualized)  Outcome: Adequate for Care Transition  Goal: Absence of Hospital-Acquired Illness or Injury  Outcome: Adequate for Care Transition  Goal: Optimal Comfort and Wellbeing  Outcome: Adequate for Care Transition  Goal: Readiness for Transition of Care  Outcome: Adequate for Care Transition     Problem: Bariatric Environmental Safety  Goal: Safety Maintained with Care  Outcome: Adequate for Care Transition     Problem: Infection  Goal: Absence of Infection Signs and Symptoms  Outcome: Adequate for Care Transition     Problem: Gastrointestinal Bleeding  Goal: Optimal Coping with Acute Illness  Outcome: Adequate for Care Transition  Goal: Hemostasis  Outcome: Adequate for Care Transition     Problem: Fall Injury Risk  Goal: Absence of Fall and Fall-Related Injury  Outcome: Adequate for Care Transition

## 2025-02-02 NOTE — DISCHARGE SUMMARY
"[facility]  Discharge Summary      Patient Name: Amna Chawla  MRN: [id number]  VANDANA: [id number]  Patient Class: IP- Inpatient  Admission Date: 1/29/2025  Hospital Length of Stay: 3 days  Discharge Date and Time:  02/02/2025 4:05 PM  Attending Physician: Long Miller MD   Discharging Provider: Augusto Garrido MD  Primary Care Provider: Tiffany Mina MD  [facility] Medicine Team: [place] [address] Augusto Garrido MD  Primary Care Team: [place] [address]    HPI:   Amna Chawla is a 59 y.o. F w/ a PMH s/f of cardiac arrest (s/p AICD Feb 2013), stroke with anoxic brain injury, a-fib, CRT, migraine, and hypertension who presents with left upper quadrant pain and left flank pain. She reports the pain began yesterday morning around 530am. She reports this pain is unlike anything she has experienced before. The pain has not been controlled with the NSADs takes at home.  She vomited "clear liquid" two times before she arrived in the ED, twice in the ED, and once this morning. Her most recent episode of emesis was coffee-ground of approximately 25 ml. She endorses diarrhea that began this morning. She also endorses cough, headache, and heartburn.    In the ED VS notable for a triage T 102.9 and hypotension 72/37 at one point. Labs remarkable for Hgb drop from 11 -> 9 and K 3.4. Imaging remarkable for findings suggesting infectious or inflammatory enteritis involving the proximal small bowel.    Admit to [place] [address]     * No surgery found *      Hospital Course:   Febrile with N/V and diarrhea at . Had a single episode of coffee-ground emesis on 1/30, but H/H stable. No need for EGD. Pain and nausea better on 2/1. Her symptoms were thought to be secondary to gastroenteritis, stool studies were sent but remained pending at the time of symptom improvement and discharge of the patient. Plan for close PCP follow-up for these results. She was continued on ceftriaxone and flagyl until symptom improvement " on 2/2. Her diet was advanced and the patient felt comfortable with discharge with PO antiemetics and bentyl for cramping abdominal pain. During her admission she was noted to have a long-standing protein gap. Initial workup for multiple myeloma was sent which was pending at the time of discharge. A post-hospital follow-up was arranged for 1 week from discharge to ensure symptom resolution and follow-up of the above labs.     Goals of Care Treatment Preferences:  Code Status: Full Code      SDOH Screening:  The patient was screened for utility difficulties, food insecurity, transport difficulties, housing insecurity, and interpersonal safety and there were no concerns identified this admission.     Consults:   Consults (From admission, onward)          Status Ordering Provider     Inpatient consult to Gastroenterology  Once        Provider:  (Not yet assigned)    Completed DONNA RILEY            Vitals:    02/02/25 1233   BP: (!) 140/77   Pulse: 70   Resp: 18   Temp: 97.9 °F (36.6 °C)     Physical Exam  Vitals reviewed.   Constitutional:       General: She is not in acute distress.     Appearance: She is not ill-appearing, toxic-appearing or diaphoretic.   HENT:      Head: Normocephalic and atraumatic.      Mouth/Throat:      Mouth: Mucous membranes are dry.   Eyes:      General: No scleral icterus.     Extraocular Movements: Extraocular movements intact.   Cardiovascular:      Rate and Rhythm: Normal rate and regular rhythm.      Heart sounds: No murmur heard.     No gallop.   Pulmonary:      Effort: Pulmonary effort is normal.      Breath sounds: Normal breath sounds.   Abdominal:      General: There is no distension.      Palpations: Abdomen is soft.      Tenderness: There is abdominal tenderness. There is no guarding.   Skin:     General: Skin is warm and dry.   Neurological:      Mental Status: She is alert and oriented to person, place, and time.   Psychiatric:         Behavior: Behavior normal.       * Lower  abdominal pain  Febrile with abrupt onset of pain to epigastrum, left hypochondrium,and left flank.   Improved as of 2/1/25    Plan:   - dicyclomine 10 mg PRN on discharge      Elevated total protein  Persistently elevated gamma gap dating back to 2018, with a TP 9.7 & Alb 2.8 in the setting h/o DVT, embolic stroke, and CVA requiring chronic anticoagulation. Differentials include nephrotic syndrome, multiple myeloma, and Crohn's disease.    Plan:  - Will arrange outpatient follow-up for results of below testing  - SPEP  - Serum free light chains  - Immunofixation electrophoresis        Gastroenteritis  60 yo F complaining of left upper quadrant abdominal pain and flank pain. Patient reports that symptoms started on 1/29, pain is severe and associated with nausea and vomiting. Initially no diarrhea, but now having loose bowel movements. Febrile to 102.9 and hypotensive to 72/37 in ED. Imaging suggesting infectious or inflammatory enteritis involving the proximal small bowel.    Plan:  - Given improvement in her symptoms will stop IV antibiotics and discharge. Patient to follow-up w/ PCP to ensure symptom resolution  - Stop ceftriaxone and flagyl  - Stool culture NGTD  - Fecal calprotectin pending  - Bcx NGTD  - GI Pathogen PCR Panel pending           Final Active Diagnoses:    Diagnosis Date Noted POA    PRINCIPAL PROBLEM:  Lower abdominal pain [R10.30] 01/29/2025 Yes    Elevated total protein [R77.8] 02/01/2025 Unknown    Gastroenteritis [K52.9] 01/29/2025 Yes      Problems Resolved During this Admission:    Diagnosis Date Noted Date Resolved POA    Coffee ground emesis [K92.0] 01/30/2025 02/01/2025 No    Hypotension [I95.9] 01/30/2025 02/01/2025 Yes    Fever [R50.9] 01/29/2025 01/31/2025 Yes       Discharged Condition: good    Disposition:     Follow Up:   Follow-up Information       Tiffany Mina MD Follow up.    Specialty: Internal Medicine  Contact information:  [address]  [facility]  [address]  [phone]                           Patient Instructions:      Ambulatory referral/consult to Internal Medicine   Standing Status: Future   Referral Priority: Routine Referral Type: Consultation   Referral Reason: Specialty Services Required   Requested Specialty: Internal Medicine   Number of Visits Requested: 1       Significant Diagnostic Studies: N/A    Pending Diagnostic Studies:       Procedure Component Value Units Date/Time    Calprotectin, Stool [[id number]] Collected: 01/31/25 1800    Order Status: Sent Lab Status: In process Updated: 01/31/25 2011    Specimen: Stool     Gastrointestinal Pathogens Panel, PCR [[id number]] Collected: 01/31/25 1800    Order Status: Sent Lab Status: In process Updated: 01/31/25 1801    Specimen: Stool     Giardia / Cryptosporidum, EIA [[id number]] Collected: 01/31/25 1800    Order Status: Sent Lab Status: In process Updated: 01/31/25 2011    Specimen: Stool     H. pylori antigen, stool [[id number]] Collected: 01/31/25 1800    Order Status: Sent Lab Status: In process Updated: 01/31/25 2010    Specimen: Stool     Immunofixation electrophoresis [[id number]] Collected: 02/02/25 0548    Order Status: Sent Lab Status: In process Updated: 02/02/25 0619    Specimen: Blood     Immunoglobulin free LT chains blood [[id number]] Collected: 02/02/25 0548    Order Status: Sent Lab Status: In process Updated: 02/02/25 0619    Specimen: Blood            Medications:  Reconciled Home Medications:      Medication List        START taking these medications      dicyclomine 10 MG capsule  Commonly known as: BENTYL  Take 1 capsule (10 mg total) by mouth 2 (two) times daily as needed (For abdominal cramping).     ondansetron 4 MG Tbdl  Commonly known as: ZOFRAN-ODT  Dissolve 2 tablets (8 mg total) by mouth every 6 (six) hours as needed.            CHANGE how you take these medications      carvediloL 12.5 MG tablet  Commonly known as: COREG  Take 1 tablet (12.5 mg total) by mouth 2 (two)  times daily.  What changed:   medication strength  how much to take  when to take this  Notes to patient: NEW DOSE 12.5 MG. Old dose was 50 mg            CONTINUE taking these medications      amLODIPine 5 MG tablet  Commonly known as: NORVASC  Take 1 tablet (5 mg total) by mouth once daily.     clonazePAM 1 MG tablet  Commonly known as: KlonoPIN  TAKE 1 TABLET BY MOUTH DAILY AS NEEDED FOR ANXIETY     EPINEPHrine 0.3 mg/0.3 mL Atin  Commonly known as: EPIPEN  Inject 0.3 mLs (0.3 mg total) into the muscle once. for 1 dose     furosemide 20 MG tablet  Commonly known as: LASIX  Take 1 tablet (20 mg total) by mouth daily as needed. Take as needed for weight gain (2-3 lbs/day or 5 lbs/week), shortness of breath, or leg swelling     losartan 100 MG tablet  Commonly known as: COZAAR  Take 1 tablet (100 mg total) by mouth once daily.     omeprazole 40 MG capsule  Commonly known as: PRILOSEC  Take 1 capsule (40 mg total) by mouth once daily. Take 30 minutes before breakfast     polyethylene glycol 17 gram Pwpk  Commonly known as: GLYCOLAX  Take 17 g by mouth once daily.     rivaroxaban 20 mg Tab  Commonly known as: XARELTO  Take 1 tablet (20 mg total) by mouth daily with dinner or evening meal.     rosuvastatin 40 MG Tab  Commonly known as: CRESTOR  Take 1 tablet (40 mg total) by mouth every evening.     tiaGABine 4 MG tablet  Commonly known as: GABITRIL  Take 1 tablet (4 mg total) by mouth every evening.     zolpidem 10 mg Tab  Commonly known as: AMBIEN  Take 1 tablet (10 mg total) by mouth nightly as needed (insomnia).              Indwelling Lines/Drains at time of discharge:   Lines/Drains/Airways       None                   Time spent on the discharge of patient: 35 minutes         Augusto Garrido MD  Department of Hospital Medicine  [facility]

## 2025-02-02 NOTE — PLAN OF CARE
Case Management Final Discharge Note      Discharge Disposition: Home    New DME ordered / company name: None    Relevant SDOH / Transition of Care Barriers:  None    Person available to provide assistance at home when needed and their contact information: Self    Scheduled followup appointment: Hospital follow up; PCP    Referrals placed: PCP    Transportation: Family       02/02/25 1345   Final Note   Assessment Type Final Discharge Note   Anticipated Discharge Disposition Home   Hospital Resources/Appts/Education Provided Provided patient/caregiver with written discharge plan information;Appointments scheduled and added to AVS   Post-Acute Status   Discharge Delays None known at this time     [facility]  Discharge Final Note    Primary Care Provider: Tiffany Mina MD    Expected Discharge Date: 2/2/2025    Final Discharge Note (most recent)       Final Note - 02/02/25 1345          Final Note    Assessment Type Final Discharge Note (P)      Anticipated Discharge Disposition Home or Self Care (P)      Hospital Resources/Appts/Education Provided Provided patient/caregiver with written discharge plan information;Appointments scheduled and added to AVS (P)         Post-Acute Status    Discharge Delays None known at this time (P)                      Important Message from Medicare             Contact Info       Tiffany Mina MD   Specialty: Internal Medicine   Relationship: PCP - General  Hypertension Digital Medicine Responsible Provider  Hyperlipidemia Digital Medicine Responsible Provider    [address]  [facility] [address]   Phone: [phone]       Next Steps: Follow up

## 2025-02-02 NOTE — PLAN OF CARE
Problem: Bariatric Environmental Safety  Goal: Safety Maintained with Care  Outcome: Progressing  Intervention: Promote Safety and Comfort  Flowsheets (Taken 2/2/2025 0300)  Bariatric Safety: bariatric commode at bedside     Problem: Infection  Goal: Absence of Infection Signs and Symptoms  Outcome: Progressing  Intervention: Prevent or Manage Infection  Flowsheets (Taken 2/2/2025 0300)  Fever Reduction/Comfort Measures: lightweight bedding  Infection Management: aseptic technique maintained  Isolation Precautions: precautions maintained     Problem: Gastrointestinal Bleeding  Goal: Hemostasis  Intervention: Manage Gastrointestinal Bleeding  Flowsheets (Taken 2/2/2025 0300)  Environmental Support:   calm environment promoted   environmental consistency promoted

## 2025-02-02 NOTE — PLAN OF CARE
Problem: Adult Inpatient Plan of Care  Goal: Plan of Care Review  Outcome: Met  Goal: Patient-Specific Goal (Individualized)  Outcome: Met  Goal: Absence of Hospital-Acquired Illness or Injury  Outcome: Met  Goal: Optimal Comfort and Wellbeing  Outcome: Met  Goal: Readiness for Transition of Care  Outcome: Met     Problem: Bariatric Environmental Safety  Goal: Safety Maintained with Care  Outcome: Met     Problem: Infection  Goal: Absence of Infection Signs and Symptoms  Outcome: Met     Problem: Gastrointestinal Bleeding  Goal: Optimal Coping with Acute Illness  Outcome: Met  Goal: Hemostasis  Outcome: Met     Problem: Fall Injury Risk  Goal: Absence of Fall and Fall-Related Injury  Outcome: Met     Patient discharge home via wheelchair with daughter, belongings and meds. IV removed. Discharge instructions given to patient- virtual nurse reviewed instructions with patient and daughter- questions and concerns answered.

## 2025-02-02 NOTE — ASSESSMENT & PLAN NOTE
Febrile with abrupt onset of pain to epigastrum, left hypochondrium,and left flank.   Improved as of 2/1/25    Plan:   - dicyclomine 10 mg PRN on discharge

## 2025-02-03 LAB
ALBUMIN SERPL ELPH-MCNC: 3.15 G/DL (ref 3.35–5.55)
ALPHA1 GLOB SERPL ELPH-MCNC: 0.4 G/DL (ref 0.17–0.41)
ALPHA2 GLOB SERPL ELPH-MCNC: 0.79 G/DL (ref 0.43–0.99)
B-GLOBULIN SERPL ELPH-MCNC: 0.59 G/DL (ref 0.5–1.1)
BACTERIA BLD CULT: NORMAL
BACTERIA BLD CULT: NORMAL
CRYPTOSP AG STL QL IA: NEGATIVE
G LAMBLIA AG STL QL IA: NEGATIVE
GAMMA GLOB SERPL ELPH-MCNC: 3.67 G/DL (ref 0.67–1.58)
INTERPRETATION SERPL IFE-IMP: NORMAL
KAPPA LC SER QL IA: 0.78 MG/DL (ref 0.33–1.94)
KAPPA LC/LAMBDA SER IA: 0.3 (ref 0.26–1.65)
LAMBDA LC SER QL IA: 2.58 MG/DL (ref 0.57–2.63)
PROT SERPL-MCNC: 8.6 G/DL (ref 6–8.4)

## 2025-02-04 LAB
CALPROTECTIN STL-MCNT: 20.2 MCG/G
H PYLORI AG STL QL IA: NOT DETECTED

## 2025-02-05 ENCOUNTER — DOCUMENTATION ONLY (OUTPATIENT)
Dept: PSYCHIATRY | Facility: CLINIC | Age: 60
End: 2025-02-05
Payer: MEDICARE

## 2025-02-05 LAB
PATHOLOGIST INTERPRETATION IFE: NORMAL
PATHOLOGIST INTERPRETATION SPE: NORMAL

## 2025-02-05 NOTE — PROGRESS NOTES
Pt did not check in for virtual visit.  I called her and she had forgotten and was not home so appointment was rescheduled for next Monday.

## 2025-02-07 ENCOUNTER — OFFICE VISIT (OUTPATIENT)
Dept: INTERNAL MEDICINE | Facility: CLINIC | Age: 60
End: 2025-02-07
Payer: MEDICARE

## 2025-02-07 VITALS
BODY MASS INDEX: 48.96 KG/M2 | OXYGEN SATURATION: 99 % | HEIGHT: 64 IN | HEART RATE: 82 BPM | DIASTOLIC BLOOD PRESSURE: 63 MMHG | WEIGHT: 286.81 LBS | SYSTOLIC BLOOD PRESSURE: 138 MMHG

## 2025-02-07 DIAGNOSIS — D47.2 MONOCLONAL GAMMOPATHY: Primary | ICD-10-CM

## 2025-02-07 DIAGNOSIS — R77.8 ELEVATED TOTAL PROTEIN: ICD-10-CM

## 2025-02-07 PROCEDURE — 99213 OFFICE O/P EST LOW 20 MIN: CPT | Mod: S$GLB,,, | Performed by: STUDENT IN AN ORGANIZED HEALTH CARE EDUCATION/TRAINING PROGRAM

## 2025-02-07 PROCEDURE — 1159F MED LIST DOCD IN RCRD: CPT | Mod: CPTII,S$GLB,, | Performed by: STUDENT IN AN ORGANIZED HEALTH CARE EDUCATION/TRAINING PROGRAM

## 2025-02-07 PROCEDURE — 99999 PR PBB SHADOW E&M-EST. PATIENT-LVL V: CPT | Mod: PBBFAC,,, | Performed by: STUDENT IN AN ORGANIZED HEALTH CARE EDUCATION/TRAINING PROGRAM

## 2025-02-07 PROCEDURE — 3078F DIAST BP <80 MM HG: CPT | Mod: CPTII,S$GLB,, | Performed by: STUDENT IN AN ORGANIZED HEALTH CARE EDUCATION/TRAINING PROGRAM

## 2025-02-07 PROCEDURE — 3008F BODY MASS INDEX DOCD: CPT | Mod: CPTII,S$GLB,, | Performed by: STUDENT IN AN ORGANIZED HEALTH CARE EDUCATION/TRAINING PROGRAM

## 2025-02-07 PROCEDURE — 3075F SYST BP GE 130 - 139MM HG: CPT | Mod: CPTII,S$GLB,, | Performed by: STUDENT IN AN ORGANIZED HEALTH CARE EDUCATION/TRAINING PROGRAM

## 2025-02-07 NOTE — PROGRESS NOTES
"[facility]  Subjective:     Patient ID: Amna Chawla is a 59 y.o. female.  History of Present Illness        Seen for hospital follow up.   Hospitalized for presumed viral enteritis.  Bowel movements are returning to normal.  Continues liquid diet.     No additional complaints today.  We discussed implications of her protein electrophoresis.  Agreeable to starting with eConsult to Hematology.         Current Outpatient Medications   Medication Instructions    amLODIPine (NORVASC) 5 mg, Oral, Daily    carvediloL (COREG) 12.5 mg, Oral, 2 times daily    clonazePAM (KLONOPIN) 1 MG tablet TAKE 1 TABLET BY MOUTH DAILY AS NEEDED FOR ANXIETY    dicyclomine (BENTYL) 10 mg, Oral, 2 times daily PRN    EPINEPHrine (EPIPEN) 0.3 mg, Intramuscular, Once    furosemide (LASIX) 20 mg, Oral, Daily PRN, Take as needed for weight gain (2-3 lbs/day or 5 lbs/week), shortness of breath, or leg swelling    losartan (COZAAR) 100 mg, Oral, Daily    omeprazole (PRILOSEC) 40 mg, Oral, Daily, Take 30 minutes before breakfast    ondansetron (ZOFRAN-ODT) 4 MG TbDL Dissolve 2 tablets (8 mg total) by mouth every 6 (six) hours as needed.    polyethylene glycol (GLYCOLAX) 17 g, Oral, Daily    rivaroxaban (XARELTO) 20 mg, Oral, With dinner    rosuvastatin (CRESTOR) 40 mg, Oral, Nightly    tiaGABine (GABITRIL) 4 mg, Oral, Nightly    zolpidem (AMBIEN) 10 mg, Oral, Nightly PRN     Objective:      Body mass index is 49.23 kg/m².  Vitals:    02/07/25 0958   BP: 138/63   Pulse: 82   SpO2: 99%   Weight: 130.1 kg (286 lb 13.1 oz)   Height: 5' 4" (1.626 m)   PainSc: 0-No pain     Physical Exam  Constitutional:       Appearance: Normal appearance.   Cardiovascular:      Rate and Rhythm: Normal rate.      Heart sounds: No murmur heard.  Pulmonary:      Effort: Pulmonary effort is normal. No respiratory distress.   Neurological:      Mental Status: She is alert.   Psychiatric:         Mood and Affect: Mood normal.        Physical Exam  "           Assessment:       1. Monoclonal gammopathy    2. Elevated total protein        Plan:   Impression (dictated)   Plan (software generated and edited)   Assessment & Plan          Doing well today.  Advised she continue to progress her diet starting with brat diet (bananas, rice, applesauce, toast).     Monoclonal gammopathy likely requires hematology referral.  We will start with E consult discussed with patient and she is willing to proceed with this plan.     Continue to follow up with PCP for ongoing care as scheduled.     Monoclonal gammopathy  -     E-Consult to Hemonc    Elevated total protein  -     Ambulatory referral/consult to Internal Medicine        Tests to Keep You Healthy    Mammogram: Met on 7/2/2024  Colon Cancer Screening: Met on 6/2/2022  Cervical Cancer Screening: DUE  Last Blood Pressure <= 139/89 (2/7/2025): Yes    No follow-ups on file. or sooner prn (as needed)          Juan Carlos Alberts  [facility]  [address] [address]  Phone [phone]  Fax [phone]    Part of this note is dictated using the M*Modal Fluency Direct word recognition program. It may contain word recognition mistakes or wrong word substitutions (commonly he/she and is/was substitutions) that were missed on review.    Part of this note was generated with the assistance of ambient listening technology. Verbal consent was obtained by the patient and accompanying visitor(s) for the recording of patient appointment to facilitate this note. I attest to having reviewed and edited the generated note for accuracy, though some syntax or spelling errors may persist. Please contact the author of this note for any clarification.

## 2025-02-07 NOTE — Clinical Note
Saw me for hospital follow up.  She has the monoclonal gammopathy.  I ordered eConsult to Hematology.  She has follow up with you for later this month and also beginning of March.

## 2025-02-10 ENCOUNTER — PATIENT MESSAGE (OUTPATIENT)
Dept: INTERNAL MEDICINE | Facility: CLINIC | Age: 60
End: 2025-02-10
Payer: MEDICARE

## 2025-02-10 ENCOUNTER — E-CONSULT (OUTPATIENT)
Dept: HEMATOLOGY/ONCOLOGY | Facility: CLINIC | Age: 60
End: 2025-02-10
Payer: MEDICARE

## 2025-02-10 DIAGNOSIS — E88.09 PLASMA CELL DYSCRASIA: Primary | ICD-10-CM

## 2025-02-10 DIAGNOSIS — D47.2 MONOCLONAL GAMMOPATHY: Primary | ICD-10-CM

## 2025-02-10 NOTE — CONSULTS
[facility] - Hematology 5th Fl  Response for E-Consult     Patient Name: Amna Chawla  MRN: [id number]  Primary Care Provider: Tiffany Mina MD   Requesting Provider: Juan Carlos Alberts MD  Consults    Recommendation: Thank you for the e-consult. Yes, please refer to hematology. The M protein in SPEP is concerning that this may be more than MGUS. She will likely need a marrow biopsy.     Additional future steps to consider: ambulatory referral to hematology    Total time of Consultation: 5 minute    I did not speak to the requesting provider verbally about this.     *This eConsult is based on the clinical data available to me and is furnished without benefit of a physical examination. The eConsult will need to be interpreted in light of any clinical issues or changes in patient status not available to me at the time of filing this eConsults. Significant changes in patient condition or level of acuity should result in immediate formal consultation and reevaluation. Please alert me if you have further questions.    Thank you for this eConsult referral.     Vanna De La Garza MD  [facility] - Hematology [facility]

## 2025-02-11 ENCOUNTER — OFFICE VISIT (OUTPATIENT)
Dept: PSYCHIATRY | Facility: CLINIC | Age: 60
End: 2025-02-11
Payer: MEDICARE

## 2025-02-11 DIAGNOSIS — F33.1 DEPRESSION, MAJOR, RECURRENT, MODERATE: Primary | ICD-10-CM

## 2025-02-11 PROCEDURE — 90834 PSYTX W PT 45 MINUTES: CPT | Mod: 95,,, | Performed by: SOCIAL WORKER

## 2025-02-11 NOTE — TELEPHONE ENCOUNTER
Signed referral to Heme/Onc    Monoclonal gammopathy  -     Ambulatory referral/consult to Hematology / Oncology; Future; Expected date: 02/17/2025
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
## 2025-02-12 ENCOUNTER — TELEPHONE (OUTPATIENT)
Dept: HEMATOLOGY/ONCOLOGY | Facility: CLINIC | Age: 60
End: 2025-02-12
Payer: MEDICARE

## 2025-02-12 NOTE — TELEPHONE ENCOUNTER
----- Message from Panda sent at 2/12/2025 10:18 AM CST -----  Regarding: Callback  Pt would like to request a callback in regards to scheduling a consult visit .      Contact number  [phone]

## 2025-02-18 ENCOUNTER — TELEPHONE (OUTPATIENT)
Dept: HEMATOLOGY/ONCOLOGY | Facility: CLINIC | Age: 60
End: 2025-02-18
Payer: MEDICARE

## 2025-02-18 ENCOUNTER — PATIENT MESSAGE (OUTPATIENT)
Dept: HEMATOLOGY/ONCOLOGY | Facility: CLINIC | Age: 60
End: 2025-02-18
Payer: MEDICARE

## 2025-02-18 NOTE — NURSING
Oncology Navigation   Intake  Cancer Type: Myeloma  Type of Referral: Internal  Date of Referral: 02/12/25  Initial Nurse Navigator Contact: 02/13/25  Referral to Initial Contact Timeline (days): 1  First Appointment Available: 02/20/25  Appointment Date: 02/20/25  First Available Date vs. Scheduled Date (days): 0     Treatment                              Acuity      Follow Up  No follow-ups on file.

## 2025-02-18 NOTE — TELEPHONE ENCOUNTER
Called and spoke to Patient .  Appointment scheduled on 2/20 with Dr De La Garza.  All questions and concerns addressed.   Provided my name and direct number and instructed Patient  to call with any questions and/or concerns.       LUCA MeeksN, RN-BC  BMT Nurse Navigator  [facility] [address], [place], [place] [address]  _________________________  o [phone]

## 2025-02-20 ENCOUNTER — OFFICE VISIT (OUTPATIENT)
Dept: HEMATOLOGY/ONCOLOGY | Facility: CLINIC | Age: 60
End: 2025-02-20
Payer: MEDICARE

## 2025-02-20 DIAGNOSIS — E88.09 PLASMA CELL DYSCRASIA: Primary | ICD-10-CM

## 2025-02-20 DIAGNOSIS — D64.9 ANEMIA, UNSPECIFIED TYPE: ICD-10-CM

## 2025-02-20 RX ORDER — DIAZEPAM 5 MG/1
5 TABLET ORAL ONCE AS NEEDED
Qty: 1 TABLET | Refills: 0 | Status: SHIPPED | OUTPATIENT
Start: 2025-03-10 | End: 2025-03-10

## 2025-02-20 NOTE — PROGRESS NOTES
The patient location is: home  The chief complaint leading to consultation is: elevated M protein     Visit type: audiovisual     Face to Face time with patient: 20 minutes  30 minutes of total time spent on the encounter, which includes face to face time and non-face to face time preparing to see the patient (eg, review of tests), Obtaining and/or reviewing separately obtained history, Documenting clinical information in the electronic or other health record, Independently interpreting results (not separately reported) and communicating results to the patient/family/caregiver, or Care coordination (not separately reported).         Each patient to whom he or she provides medical services by telemedicine is:  (1) informed of the relationship between the physician and patient and the respective role of any other health care provider with respect to management of the patient; and (2) notified that he or she may decline to receive medical services by telemedicine and may withdraw from such care at any time.    Hematology Oncology  Initial Consult Note    Patient: Amna Chawla  MRN: [id number]  Date: 2/20/2025    Chief Complaint: No chief complaint on file.      Oncologic History:     Oncologic History:   2/2/2025: Elevated M protein noted 3.43 g/dL with ANALI showing IgG lambda, normal FLC, mild anemia  Pathology:  Bone marrow biopsy to be done on 3/10/25      Subjective:     Interval History: Ms. Chawla is a 59 y.o. female who is being seen for an initial consult for elevated M protein. Her co-morbidities include CHB c/b cardiac arrest s/p CRT-D, AFib on Xarelto, HFrEF with improved EF (LVEF 50-55%), prior CVA, HTN, depression, SHIRA on CPAP. She was hospitalized from 1/29 - 2/02 with left upper quadrant pain and was found to have small bowel enteritis. She improved with antibiotics and symptomatic care. During hospitalization, myeloma labs were ordered to assess elevated protein gap. She was found to have IgG lambda  with M protein of 3.43 g/dL and subsequently referred to Hematology clinic. Since hospitalization, she has been feeling overall well. She denies fevers, night sweats, weight loss, bone pain.      Past Medical History:   Past Medical History:   Diagnosis Date    Anticoagulant long-term use     Anxiety     Asthma     Atrial fibrillation     Brain anoxic injury     Cervicalgia 8/28/2014    CHI (closed head injury) 2/19/2013    Convulsion 5/30/2015    Decreased ROM of left shoulder 4/12/2017    Defibrillator activation 2013    Depression     Heart block     History of sudden cardiac arrest 2/2013    PEA arrest with subsequent long-QT    Hx of psychiatric care     Hypertension     Left atrial enlargement 4/11/2018    Pacemaker 2013    Paresthesia 11/1/2013    Prolonged Q-T interval on ECG 2/8/2013    Psychiatric problem     Seizures     Sleep difficulties     Stroke     weakness lt side    Therapy     Thyroid disease     Upper airway resistance syndrome 2/21/2017       Past Surgical HIstory:   Past Surgical History:   Procedure Laterality Date    breast reduction      CARDIAC DEFIBRILLATOR PLACEMENT      CARDIAC DEFIBRILLATOR PLACEMENT      CARPAL TUNNEL RELEASE Right     COLONOSCOPY N/A 5/7/2019    Procedure: COLONOSCOPY;  Surgeon: Juan Coffey MD;  Location: [facility] (2ND FLR);  Service: Endoscopy;  Laterality: N/A;  per DR. Bustamante Pt to have balloon with DR. Coffey due to excesive looping, could not reach cecum - see telephone encounter 3/8/19 and last procedure report dated 6/24/14/ Per Piedad schedule as 90 min case- ERW  pacemaker/AICD Boitronik/   per , ok to hold Xareltox 2 days    COLONOSCOPY N/A 6/2/2022    Procedure: COLONOSCOPY;  Surgeon: Juan Coffey MD;  Location: [facility] (2ND FLR);  Service: Endoscopy;  Laterality: N/A;  combined orders    ESOPHAGOGASTRODUODENOSCOPY N/A 6/2/2022    Procedure: EGD (ESOPHAGOGASTRODUODENOSCOPY);  Surgeon: Juan Coffey MD;  Location: [facility] (2ND FLR);   Service: Endoscopy;  Laterality: N/A;  BMI 54; pt requests 1st available [facility]  Fully vaccinated, Hold Xarelto x 2 days per Dr. Mejia and Plavix x 5 days per Dr. Grossman see telephone encounter 4/25/22, Biotronik PM/AICD, prep instr portal and mailed -ml    HERNIA REPAIR      HIATAL HERNIA REPAIR      INSERT / REPLACE / REMOVE PACEMAKER  10/2017    CRT-D upgrade    REVISION OF IMPLANTABLE CARDIOVERTER-DEFIBRILLATOR (ICD) ELECTRODE LEAD PLACEMENT Left 12/7/2020    Procedure: REVISION, INSERTION, ELECTRODE LEAD, ICD;  Surgeon: Avelino Mejia MD;  Location: [facility];  Service: Cardiology;  Laterality: Left;    REVISION OF SKIN POCKET FOR CARDIOVERTER-DEFIBRILLATOR  12/7/2020    Procedure: Revision, Skin Pocket, For Cardioverter-Defibrillator;  Surgeon: Avelino Mejia MD;  Location: [facility];  Service: Cardiology;;    TOTAL REDUCTION MAMMOPLASTY      TRANSESOPHAGEAL ECHOCARDIOGRAPHY N/A 12/7/2020    Procedure: ECHOCARDIOGRAM, TRANSESOPHAGEAL;  Surgeon: Ivory Goodman MD;  Location: [facility];  Service: Cardiology;  Laterality: N/A;    TRANSESOPHAGEAL ECHOCARDIOGRAPHY N/A 12/7/2020    Procedure: ECHOCARDIOGRAM, TRANSESOPHAGEAL;  Surgeon: [facility] Diagnostic Provider;  Location: [facility] 2ND FLR;  Service: Cardiology;  Laterality: N/A;    TRIGGER FINGER RELEASE Left 8/2/2019    Procedure: RELEASE, TRIGGER FINGER left middle;  Surgeon: Matt Pugh Jr., MD;  Location: [facility];  Service: Plastics;  Laterality: Left;    TRIGGER FINGER RELEASE Right 2/11/2020    Procedure: RELEASE, TRIGGER FINGER middle;  Surgeon: Matt Pugh Jr., MD;  Location: [facility];  Service: Plastics;  Laterality: Right;    TUBAL LIGATION         Family History:   Family History   Problem Relation Name Age of Onset    COPD Mother      Hypertension Mother      Hypertension Father      Asthma Daughter Donna     Asthma Daughter Lauryn     Glaucoma Maternal Grandmother      Glaucoma Paternal Grandmother      COPD Other      Heart  failure Other         Social History:  reports that she has never smoked. She has never been exposed to tobacco smoke. She has never used smokeless tobacco. She reports that she does not currently use alcohol. She reports that she does not use drugs.    Medications:  Current Medications[1]    Review of Systems   Constitutional:  Negative for appetite change, chills, fatigue, fever and unexpected weight change.   HENT:  Negative for congestion and dental problem.    Eyes:  Negative for photophobia and visual disturbance.   Respiratory:  Negative for cough and shortness of breath.    Cardiovascular:  Negative for chest pain and leg swelling.   Gastrointestinal:  Negative for abdominal pain, constipation, diarrhea and nausea.   Genitourinary:  Negative for difficulty urinating and dysuria.   Musculoskeletal:  Negative for arthralgias and back pain.   Skin:  Negative for color change and rash.   Neurological:  Negative for light-headedness and headaches.   Hematological:  Negative for adenopathy. Does not bruise/bleed easily.   Psychiatric/Behavioral:  Negative for agitation and behavioral problems.        ECOG Score:  1  ECOG SCORE               Objective:     There were no vitals filed for this visit.    BMI: There is no height or weight on file to calculate BMI.     Limited due to virtual visit  Physical Exam  Constitutional:       General: She is not in acute distress.  HENT:      Head: Normocephalic and atraumatic.   Pulmonary:      Effort: Pulmonary effort is normal. No respiratory distress.   Neurological:      Mental Status: She is alert and oriented to person, place, and time. Mental status is at baseline.   Psychiatric:         Mood and Affect: Mood normal.         Behavior: Behavior normal.         Laboratory Data:  No visits with results within 1 Week(s) from this visit.   Latest known visit with results is:   No results displayed because visit has over 200 results.          Imaging: Reviewed    Pathology:   Bone marrow biopsy to be done    Assessment:     No diagnosis found.      Plan:     Plasma cell dyscrasia  Anemia  Patient is a 59 year old woman who was found to have IgG lambda with M protein 3.43 g/dL on labs for elevated protein gap. She has associated anemia, which is all concerning for hematological malignancy such as multiple myeloma.    Will need additional evaluation as below:    PLAN:  - Obtain bone marrow biopsy, patient prefers after Tato Gras on 3/10 at 1:30 pm  - Obtain PET scan to evaluate for bone lesions  - Follow up a week after bone marrow to review results       Discussed with Dr. De La Garza.    La Anderson MD   Hematology and Oncology Fellow, PGY VI     Route Chart for Scheduling    BMT Chart Routing  Urgent    Follow up with physician 4 weeks. Virtually to review bone marrow results, marrow to be scheduled on 3/10 at 1:30pm   Follow up with FERNANDO    Provider visit type Malignant hem   Infusion scheduling note    Injection scheduling note    Labs CBC   Scheduling:  Preferred lab:  Lab interval:  CBC on day of bone marrow 3/10   Imaging PET scan   PET scan next available   Pharmacy appointment    Other referrals     Schedule bone marrow biopsy                   Therapy Plan Information  INF HEADACHE FAST TRACK 3 for Chronic migraine without aura, with intractable migraine, so stated, with status migrainosus, noted on 8/28/2014  Beta Blocker  metoprolol injection 5 mg  5 mg, Intravenous  Antiemetics  prochlorperazine injection Soln 10 mg  10 mg, Intravenous, Every visit  ondansetron HCl (PF) 4 mg/2 mL injection 4 mg  4 mg, Intravenous  metoclopramide HCl injection 10 mg  10 mg  Steroid  dexamethasone injection 4 mg  4 mg, Intravenous  methylprednisolone sod suc(PF) 125 mg/2 mL injection 125 mg  125 mg, Intravenous  methylPREDNISolone sodium succinate (SOLU-MEDROL) in dextrose 5 % 100 mL IVPB  Intravenous  Basic IV Regimen  magnesium sulfate 2 g in dextrose 5 % 100 mL IVPB  2 g, Intravenous, Every  visit  pantoprazole injection 40 mg  40 mg, Intravenous  Pain control  morphine injection 2 mg  2 mg, Intravenous, Every visit  HYDROmorphone injection  Intravenous  Vasoconstrictor  caffeine-sodium benzoate injection 500 mg  500 mg, Intramuscular  dihydroergotamine injection 1 mg  1 mg, Intravenous  SUMAtriptan succinate injection 6 mg  6 mg, Subcutaneous, Every visit  Muscle relaxant  diazepam injection  Intravenous  Sedative  diphenhydrAMINE injection 50 mg  50 mg, Intravenous, Every visit  haloperidol lactate injection 5 mg  5 mg, Intravenous  haloperidol lactate 5 mg in sodium chloride 0.9% 500 mL  Intravenous  droperidol injection  Antiepileptic drugs  valproate (DEPACON) 1,000 mg in dextrose 5 % 100 mL IVPB  1,000 mg, Intravenous, Every visit  Anti-inflammatory  ketorolac injection 60 mg  60 mg, Intramuscular, Every visit  ketorolac injection 30 mg  30 mg, Intravenous      No therapy plan of the specified type found.    No therapy plan of the specified type found.         [1]   Current Outpatient Medications   Medication Sig Dispense Refill    amLODIPine (NORVASC) 5 MG tablet Take 1 tablet (5 mg total) by mouth once daily. 90 tablet 3    carvediloL (COREG) 12.5 MG tablet Take 1 tablet (12.5 mg total) by mouth 2 (two) times daily. 180 tablet 3    clonazePAM (KLONOPIN) 1 MG tablet TAKE 1 TABLET BY MOUTH DAILY AS NEEDED FOR ANXIETY 30 tablet 5    dicyclomine (BENTYL) 10 MG capsule Take 1 capsule (10 mg total) by mouth 2 (two) times daily as needed (For abdominal cramping). 14 capsule 0    losartan (COZAAR) 100 MG tablet Take 1 tablet (100 mg total) by mouth once daily. 90 tablet 3    omeprazole (PRILOSEC) 40 MG capsule Take 1 capsule (40 mg total) by mouth once daily. Take 30 minutes before breakfast 90 capsule 3    ondansetron (ZOFRAN-ODT) 4 MG TbDL Dissolve 2 tablets (8 mg total) by mouth every 6 (six) hours as needed. 20 tablet 0    rivaroxaban (XARELTO) 20 mg Tab Take 1 tablet (20 mg total) by mouth daily  with dinner or evening meal. 30 tablet 11    rosuvastatin (CRESTOR) 40 MG Tab Take 1 tablet (40 mg total) by mouth every evening. 90 tablet 3    tiaGABine (GABITRIL) 4 MG tablet Take 1 tablet (4 mg total) by mouth every evening. 30 tablet 12    zolpidem (AMBIEN) 10 mg Tab Take 1 tablet (10 mg total) by mouth nightly as needed (insomnia). 30 tablet 5    EPINEPHrine (EPIPEN) 0.3 mg/0.3 mL AtIn Inject 0.3 mLs (0.3 mg total) into the muscle once. for 1 dose 0.3 mL 1    furosemide (LASIX) 20 MG tablet Take 1 tablet (20 mg total) by mouth daily as needed. Take as needed for weight gain (2-3 lbs/day or 5 lbs/week), shortness of breath, or leg swelling (Patient not taking: Reported on 2/20/2025) 45 tablet 3    polyethylene glycol (GLYCOLAX) 17 gram PwPk Take 17 g by mouth once daily. (Patient not taking: Reported on 2/20/2025) 20 each 12     Current Facility-Administered Medications   Medication Dose Route Frequency Provider Last Rate Last Admin    cyanocobalamin tablet 100 mcg  100 mcg Oral 1 time in Clinic/HOD         onabotulinumtoxina injection 200 Units  200 Units Intramuscular Q90 Days David Cortez MD   200 Units at 10/19/18 1713    onabotulinumtoxina injection 200 Units  200 Units Intramuscular Q90 Days David Cortez MD   200 Units at 12/28/18 1106    onabotulinumtoxina injection 200 Units  200 Units Intramuscular Q90 Days David Cortez MD   200 Units at 03/13/19 1504    onabotulinumtoxina injection 200 Units  200 Units Intramuscular Q90 Days David Cortez MD   200 Units at 05/23/19 1123    onabotulinumtoxina injection 200 Units  200 Units Intramuscular Q90 Days David Cortez MD   200 Units at 10/11/19 1112    onabotulinumtoxina injection 200 Units  200 Units Intramuscular Q90 Days David Cortez MD   200 Units at 12/19/19 1036    onabotulinumtoxina injection 200 Units  200 Units Intramuscular Q10 weeks David Cortez MD   200 Units at 12/27/24 1030    onabotulinumtoxina injection 200 Units  200 Units  Intramuscular Q90 Days David Cortez MD   200 Units at 01/04/24 1349     Facility-Administered Medications Ordered in Other Visits   Medication Dose Route Frequency Provider Last Rate Last Admin    lactated ringers infusion   Intravenous Continuous Humberto Angel MD   Stopped at 02/11/20 0801    lidocaine (PF) 10 mg/ml (1%) injection 5 mg  0.5 mL Intradermal Once Humberto Angel MD

## 2025-02-21 ENCOUNTER — TELEPHONE (OUTPATIENT)
Dept: HEMATOLOGY/ONCOLOGY | Facility: CLINIC | Age: 60
End: 2025-02-21
Payer: MEDICARE

## 2025-02-21 NOTE — TELEPHONE ENCOUNTER
----- Message from Nurse Roberts sent at 2/20/2025  4:33 PM CST -----  Regarding: FW: JAMARCUS Chart - Staff Pool - Urgent    ----- Message -----  From: La Anderson MD  Sent: 2/20/2025   3:33 PM CST  To: Frederic Prabhakar Staff  Subject: CC Chart - Staff Pool - Urgent

## 2025-02-26 ENCOUNTER — LAB VISIT (OUTPATIENT)
Dept: LAB | Facility: HOSPITAL | Age: 60
End: 2025-02-26
Attending: INTERNAL MEDICINE
Payer: MEDICARE

## 2025-02-26 ENCOUNTER — OFFICE VISIT (OUTPATIENT)
Dept: INTERNAL MEDICINE | Facility: CLINIC | Age: 60
End: 2025-02-26
Payer: MEDICARE

## 2025-02-26 VITALS
HEART RATE: 100 BPM | BODY MASS INDEX: 49.08 KG/M2 | SYSTOLIC BLOOD PRESSURE: 130 MMHG | DIASTOLIC BLOOD PRESSURE: 60 MMHG | OXYGEN SATURATION: 98 % | HEIGHT: 64 IN | WEIGHT: 287.5 LBS

## 2025-02-26 DIAGNOSIS — Z23 NEED FOR VACCINATION: ICD-10-CM

## 2025-02-26 DIAGNOSIS — R53.81 PHYSICAL DECONDITIONING: ICD-10-CM

## 2025-02-26 DIAGNOSIS — D51.9 ANEMIA DUE TO VITAMIN B12 DEFICIENCY, UNSPECIFIED B12 DEFICIENCY TYPE: ICD-10-CM

## 2025-02-26 DIAGNOSIS — R77.8 ELEVATED TOTAL PROTEIN: ICD-10-CM

## 2025-02-26 DIAGNOSIS — K21.9 GASTROESOPHAGEAL REFLUX DISEASE WITHOUT ESOPHAGITIS: Primary | ICD-10-CM

## 2025-02-26 DIAGNOSIS — E55.9 VITAMIN D DEFICIENCY: ICD-10-CM

## 2025-02-26 DIAGNOSIS — I10 ESSENTIAL HYPERTENSION: ICD-10-CM

## 2025-02-26 DIAGNOSIS — R73.03 PREDIABETES: ICD-10-CM

## 2025-02-26 DIAGNOSIS — R10.9 ABDOMINAL CRAMPING: ICD-10-CM

## 2025-02-26 DIAGNOSIS — E78.2 MIXED HYPERLIPIDEMIA: ICD-10-CM

## 2025-02-26 DIAGNOSIS — Z86.73 HISTORY OF CVA (CEREBROVASCULAR ACCIDENT): ICD-10-CM

## 2025-02-26 DIAGNOSIS — E66.813 CLASS 3 SEVERE OBESITY DUE TO EXCESS CALORIES WITH SERIOUS COMORBIDITY AND BODY MASS INDEX (BMI) OF 45.0 TO 49.9 IN ADULT: ICD-10-CM

## 2025-02-26 DIAGNOSIS — E66.01 CLASS 3 SEVERE OBESITY DUE TO EXCESS CALORIES WITH SERIOUS COMORBIDITY AND BODY MASS INDEX (BMI) OF 45.0 TO 49.9 IN ADULT: ICD-10-CM

## 2025-02-26 LAB
ANION GAP SERPL CALC-SCNC: 5 MMOL/L (ref 8–16)
BASOPHILS # BLD AUTO: 0 K/UL (ref 0–0.2)
BASOPHILS NFR BLD: 0 % (ref 0–1.9)
BUN SERPL-MCNC: 14 MG/DL (ref 6–20)
CALCIUM SERPL-MCNC: 8.7 MG/DL (ref 8.7–10.5)
CHLORIDE SERPL-SCNC: 110 MMOL/L (ref 95–110)
CHOLEST SERPL-MCNC: 100 MG/DL (ref 120–199)
CHOLEST/HDLC SERPL: 3.6 {RATIO} (ref 2–5)
CO2 SERPL-SCNC: 22 MMOL/L (ref 23–29)
CREAT SERPL-MCNC: 0.9 MG/DL (ref 0.5–1.4)
DIFFERENTIAL METHOD BLD: ABNORMAL
EOSINOPHIL # BLD AUTO: 0 K/UL (ref 0–0.5)
EOSINOPHIL NFR BLD: 0 % (ref 0–8)
ERYTHROCYTE [DISTWIDTH] IN BLOOD BY AUTOMATED COUNT: 16.5 % (ref 11.5–14.5)
EST. GFR  (NO RACE VARIABLE): >60 ML/MIN/1.73 M^2
ESTIMATED AVG GLUCOSE: 131 MG/DL (ref 68–131)
GLUCOSE SERPL-MCNC: 109 MG/DL (ref 70–110)
HBA1C MFR BLD: 6.2 % (ref 4–5.6)
HCT VFR BLD AUTO: 31.9 % (ref 37–48.5)
HDLC SERPL-MCNC: 28 MG/DL (ref 40–75)
HDLC SERPL: 28 % (ref 20–50)
HGB BLD-MCNC: 10.2 G/DL (ref 12–16)
IMM GRANULOCYTES # BLD AUTO: 0 K/UL (ref 0–0.04)
IMM GRANULOCYTES NFR BLD AUTO: 0 % (ref 0–0.5)
LDLC SERPL CALC-MCNC: 56.8 MG/DL (ref 63–159)
LYMPHOCYTES # BLD AUTO: 1.4 K/UL (ref 1–4.8)
LYMPHOCYTES NFR BLD: 45.1 % (ref 18–48)
MCH RBC QN AUTO: 30.2 PG (ref 27–31)
MCHC RBC AUTO-ENTMCNC: 32 G/DL (ref 32–36)
MCV RBC AUTO: 94 FL (ref 82–98)
MONOCYTES # BLD AUTO: 0.3 K/UL (ref 0.3–1)
MONOCYTES NFR BLD: 11 % (ref 4–15)
NEUTROPHILS # BLD AUTO: 1.4 K/UL (ref 1.8–7.7)
NEUTROPHILS NFR BLD: 43.9 % (ref 38–73)
NONHDLC SERPL-MCNC: 72 MG/DL
NRBC BLD-RTO: 0 /100 WBC
PLATELET # BLD AUTO: 160 K/UL (ref 150–450)
PMV BLD AUTO: 11.6 FL (ref 9.2–12.9)
POTASSIUM SERPL-SCNC: 3.4 MMOL/L (ref 3.5–5.1)
RBC # BLD AUTO: 3.38 M/UL (ref 4–5.4)
SODIUM SERPL-SCNC: 137 MMOL/L (ref 136–145)
TRIGL SERPL-MCNC: 76 MG/DL (ref 30–150)
TSH SERPL DL<=0.005 MIU/L-ACNC: 1.13 UIU/ML (ref 0.4–4)
WBC # BLD AUTO: 3.08 K/UL (ref 3.9–12.7)

## 2025-02-26 PROCEDURE — 82306 VITAMIN D 25 HYDROXY: CPT | Performed by: INTERNAL MEDICINE

## 2025-02-26 PROCEDURE — 80048 BASIC METABOLIC PNL TOTAL CA: CPT | Performed by: INTERNAL MEDICINE

## 2025-02-26 PROCEDURE — 80061 LIPID PANEL: CPT | Performed by: INTERNAL MEDICINE

## 2025-02-26 PROCEDURE — 36415 COLL VENOUS BLD VENIPUNCTURE: CPT | Performed by: INTERNAL MEDICINE

## 2025-02-26 PROCEDURE — G0008 ADMIN INFLUENZA VIRUS VAC: HCPCS | Mod: S$GLB,,, | Performed by: INTERNAL MEDICINE

## 2025-02-26 PROCEDURE — 84443 ASSAY THYROID STIM HORMONE: CPT | Performed by: INTERNAL MEDICINE

## 2025-02-26 PROCEDURE — 4010F ACE/ARB THERAPY RXD/TAKEN: CPT | Mod: CPTII,S$GLB,, | Performed by: INTERNAL MEDICINE

## 2025-02-26 PROCEDURE — 82607 VITAMIN B-12: CPT | Performed by: INTERNAL MEDICINE

## 2025-02-26 PROCEDURE — 83036 HEMOGLOBIN GLYCOSYLATED A1C: CPT | Performed by: INTERNAL MEDICINE

## 2025-02-26 PROCEDURE — 99214 OFFICE O/P EST MOD 30 MIN: CPT | Mod: 25,S$GLB,, | Performed by: INTERNAL MEDICINE

## 2025-02-26 PROCEDURE — 3008F BODY MASS INDEX DOCD: CPT | Mod: CPTII,S$GLB,, | Performed by: INTERNAL MEDICINE

## 2025-02-26 PROCEDURE — 3075F SYST BP GE 130 - 139MM HG: CPT | Mod: CPTII,S$GLB,, | Performed by: INTERNAL MEDICINE

## 2025-02-26 PROCEDURE — 1111F DSCHRG MED/CURRENT MED MERGE: CPT | Mod: CPTII,S$GLB,, | Performed by: INTERNAL MEDICINE

## 2025-02-26 PROCEDURE — 99999 PR PBB SHADOW E&M-EST. PATIENT-LVL V: CPT | Mod: PBBFAC,,, | Performed by: INTERNAL MEDICINE

## 2025-02-26 PROCEDURE — 85025 COMPLETE CBC W/AUTO DIFF WBC: CPT | Performed by: INTERNAL MEDICINE

## 2025-02-26 PROCEDURE — 3078F DIAST BP <80 MM HG: CPT | Mod: CPTII,S$GLB,, | Performed by: INTERNAL MEDICINE

## 2025-02-26 PROCEDURE — 1160F RVW MEDS BY RX/DR IN RCRD: CPT | Mod: CPTII,S$GLB,, | Performed by: INTERNAL MEDICINE

## 2025-02-26 PROCEDURE — 90656 IIV3 VACC NO PRSV 0.5 ML IM: CPT | Mod: S$GLB,,, | Performed by: INTERNAL MEDICINE

## 2025-02-26 PROCEDURE — 1159F MED LIST DOCD IN RCRD: CPT | Mod: CPTII,S$GLB,, | Performed by: INTERNAL MEDICINE

## 2025-02-26 RX ORDER — CALCIUM CARBONATE 160(400)MG
1 TABLET,CHEWABLE ORAL ONCE
Qty: 1 EACH | Refills: 0 | Status: SHIPPED | OUTPATIENT
Start: 2025-02-26 | End: 2025-02-26

## 2025-02-26 RX ORDER — DICYCLOMINE HYDROCHLORIDE 10 MG/1
10 CAPSULE ORAL 2 TIMES DAILY PRN
Qty: 30 CAPSULE | Refills: 0 | Status: SHIPPED | OUTPATIENT
Start: 2025-02-26 | End: 2025-03-28

## 2025-02-26 RX ORDER — OMEPRAZOLE 40 MG/1
40 CAPSULE, DELAYED RELEASE ORAL DAILY
Qty: 90 CAPSULE | Refills: 3 | Status: SHIPPED | OUTPATIENT
Start: 2025-02-26 | End: 2026-02-26

## 2025-02-26 RX ORDER — AMLODIPINE BESYLATE 5 MG/1
5 TABLET ORAL DAILY
Qty: 90 TABLET | Refills: 3 | Status: SHIPPED | OUTPATIENT
Start: 2025-02-26 | End: 2026-02-26

## 2025-02-26 RX ORDER — LOSARTAN POTASSIUM 100 MG/1
100 TABLET ORAL DAILY
Qty: 90 TABLET | Refills: 3 | Status: SHIPPED | OUTPATIENT
Start: 2025-02-26 | End: 2026-02-26
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
## 2025-02-27 ENCOUNTER — TELEPHONE (OUTPATIENT)
Dept: HEMATOLOGY/ONCOLOGY | Facility: CLINIC | Age: 60
End: 2025-02-27
Payer: MEDICARE

## 2025-02-27 ENCOUNTER — RESULTS FOLLOW-UP (OUTPATIENT)
Dept: INTERNAL MEDICINE | Facility: CLINIC | Age: 60
End: 2025-02-27
Payer: MEDICARE

## 2025-02-27 DIAGNOSIS — E87.6 HYPOKALEMIA: ICD-10-CM

## 2025-02-27 DIAGNOSIS — E55.9 VITAMIN D DEFICIENCY: Primary | ICD-10-CM

## 2025-02-27 LAB
25(OH)D3+25(OH)D2 SERPL-MCNC: 13 NG/ML (ref 30–96)
VIT B12 SERPL-MCNC: 364 PG/ML (ref 210–950)

## 2025-02-27 RX ORDER — POTASSIUM CHLORIDE 750 MG/1
20 TABLET, EXTENDED RELEASE ORAL DAILY
Qty: 3 TABLET | Refills: 0 | Status: SHIPPED | OUTPATIENT
Start: 2025-02-27

## 2025-02-27 RX ORDER — ERGOCALCIFEROL 1.25 MG/1
50000 CAPSULE ORAL
Qty: 12 CAPSULE | Refills: 3 | Status: SHIPPED | OUTPATIENT
Start: 2025-02-27

## 2025-02-27 NOTE — TELEPHONE ENCOUNTER
Pt was just confirming that the pet scan can take place after the bmbx. Informed pt that that will be fine how she is currently scheduled.

## 2025-02-27 NOTE — TELEPHONE ENCOUNTER
----- Message from Panda sent at 2/27/2025 10:51 AM CST -----  Regarding: Callback  Pt would like to request  a callback in regards some concerns she has about her procedure and her Pet scan .Contact number  [phone]

## 2025-02-28 ENCOUNTER — TELEPHONE (OUTPATIENT)
Dept: ENDOSCOPY | Facility: HOSPITAL | Age: 60
End: 2025-02-28
Payer: MEDICARE

## 2025-02-28 ENCOUNTER — EXTERNAL CHRONIC CARE MANAGEMENT (OUTPATIENT)
Dept: PRIMARY CARE CLINIC | Facility: CLINIC | Age: 60
End: 2025-02-28
Payer: MEDICARE

## 2025-02-28 PROCEDURE — 99439 CHRNC CARE MGMT STAF EA ADDL: CPT | Mod: S$GLB,,, | Performed by: INTERNAL MEDICINE

## 2025-02-28 PROCEDURE — 99490 CHRNC CARE MGMT STAFF 1ST 20: CPT | Mod: S$GLB,,, | Performed by: INTERNAL MEDICINE

## 2025-02-28 NOTE — TELEPHONE ENCOUNTER
Contacted pt in regards to below message received. Informed pt she has a referral to see a GI provider. Provided the pt with the clinic number to make an appt as well.      FW: Scheduling Request  Received: Today  Shandra Aguilar Lindsay  Caller: Amna (Yesterday, 10:33 AM)         Previous Messages       ----- Message -----  From: Nayely Goodson  Sent: 2/27/2025  10:34 AM CST  To: [facility] Schedulers  Subject: Scheduling Request                              SCHEDULING/REQUEST    Appt Type: Est    Date/Time Preference: asap    Treating Provider: Tiffany Mina MD    Caller Name: Amna    Contact Preference: [phone] (home)      Comments/Notes: Pt has a referral in the system to get an endoscopy. She would like a call back in order to be scheduled in.

## 2025-03-03 ENCOUNTER — PATIENT MESSAGE (OUTPATIENT)
Dept: INTERNAL MEDICINE | Facility: CLINIC | Age: 60
End: 2025-03-03
Payer: MEDICARE
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
## 2025-03-05 NOTE — TELEPHONE ENCOUNTER
2/26 labs performed, 2/27 provider recommended oral potassium pills once daily for 3 more days    Rx sent to pharmacy with instructions to take 2 tablets once daily to dispense 3 tablets    Attempted to reach pt to determine if she has completed the medication, received voicemail    Called pharmacy and confirmed that the medication was dispensed as written 3 tablets given with instructions to take 2 tablets daily

## 2025-03-10 ENCOUNTER — PROCEDURE VISIT (OUTPATIENT)
Dept: HEMATOLOGY/ONCOLOGY | Facility: CLINIC | Age: 60
End: 2025-03-10
Payer: MEDICARE

## 2025-03-10 ENCOUNTER — LAB VISIT (OUTPATIENT)
Dept: LAB | Facility: HOSPITAL | Age: 60
End: 2025-03-10
Payer: MEDICARE

## 2025-03-10 ENCOUNTER — PATIENT MESSAGE (OUTPATIENT)
Dept: INTERNAL MEDICINE | Facility: CLINIC | Age: 60
End: 2025-03-10
Payer: MEDICARE

## 2025-03-10 VITALS
BODY MASS INDEX: 49.08 KG/M2 | WEIGHT: 287.5 LBS | HEART RATE: 48 BPM | TEMPERATURE: 98 F | SYSTOLIC BLOOD PRESSURE: 105 MMHG | DIASTOLIC BLOOD PRESSURE: 69 MMHG | HEIGHT: 64 IN | RESPIRATION RATE: 16 BRPM | OXYGEN SATURATION: 98 %

## 2025-03-10 DIAGNOSIS — D72.0 GENETIC ANOMALIES OF LEUKOCYTES: ICD-10-CM

## 2025-03-10 DIAGNOSIS — E88.09 PLASMA CELL DYSCRASIA: ICD-10-CM

## 2025-03-10 DIAGNOSIS — E88.09 PLASMA CELL DYSCRASIA: Primary | ICD-10-CM

## 2025-03-10 DIAGNOSIS — D64.9 ANEMIA, UNSPECIFIED TYPE: ICD-10-CM

## 2025-03-10 LAB
BASOPHILS # BLD AUTO: 0.01 K/UL (ref 0–0.2)
BASOPHILS NFR BLD: 0.3 % (ref 0–1.9)
DIFFERENTIAL METHOD BLD: ABNORMAL
EOSINOPHIL # BLD AUTO: 0 K/UL (ref 0–0.5)
EOSINOPHIL NFR BLD: 0 % (ref 0–8)
ERYTHROCYTE [DISTWIDTH] IN BLOOD BY AUTOMATED COUNT: 16.6 % (ref 11.5–14.5)
HCT VFR BLD AUTO: 33.4 % (ref 37–48.5)
HGB BLD-MCNC: 10.7 G/DL (ref 12–16)
IMM GRANULOCYTES # BLD AUTO: 0 K/UL (ref 0–0.04)
IMM GRANULOCYTES NFR BLD AUTO: 0 % (ref 0–0.5)
LYMPHOCYTES # BLD AUTO: 1.4 K/UL (ref 1–4.8)
LYMPHOCYTES NFR BLD: 43.7 % (ref 18–48)
MCH RBC QN AUTO: 30.1 PG (ref 27–31)
MCHC RBC AUTO-ENTMCNC: 32 G/DL (ref 32–36)
MCV RBC AUTO: 94 FL (ref 82–98)
MONOCYTES # BLD AUTO: 0.3 K/UL (ref 0.3–1)
MONOCYTES NFR BLD: 10.4 % (ref 4–15)
NEUTROPHILS # BLD AUTO: 1.4 K/UL (ref 1.8–7.7)
NEUTROPHILS NFR BLD: 45.6 % (ref 38–73)
NRBC BLD-RTO: 0 /100 WBC
PLATELET # BLD AUTO: 248 K/UL (ref 150–450)
PMV BLD AUTO: 12.2 FL (ref 9.2–12.9)
RBC # BLD AUTO: 3.56 M/UL (ref 4–5.4)
WBC # BLD AUTO: 3.09 K/UL (ref 3.9–12.7)

## 2025-03-10 PROCEDURE — 85025 COMPLETE CBC W/AUTO DIFF WBC: CPT | Performed by: STUDENT IN AN ORGANIZED HEALTH CARE EDUCATION/TRAINING PROGRAM

## 2025-03-10 PROCEDURE — 88313 SPECIAL STAINS GROUP 2: CPT | Mod: 26,,, | Performed by: PATHOLOGY

## 2025-03-10 PROCEDURE — 88184 FLOWCYTOMETRY/ TC 1 MARKER: CPT | Mod: 91

## 2025-03-10 PROCEDURE — 99499 UNLISTED E&M SERVICE: CPT | Mod: S$GLB,,,

## 2025-03-10 PROCEDURE — 88271 CYTOGENETICS DNA PROBE: CPT

## 2025-03-10 PROCEDURE — 88311 DECALCIFY TISSUE: CPT | Performed by: PATHOLOGY

## 2025-03-10 PROCEDURE — 88311 DECALCIFY TISSUE: CPT | Mod: 26,,, | Performed by: PATHOLOGY

## 2025-03-10 PROCEDURE — 88299 UNLISTED CYTOGENETIC STUDY: CPT

## 2025-03-10 PROCEDURE — 85097 BONE MARROW INTERPRETATION: CPT | Mod: 26,,, | Performed by: PATHOLOGY

## 2025-03-10 PROCEDURE — 88342 IMHCHEM/IMCYTCHM 1ST ANTB: CPT | Performed by: PATHOLOGY

## 2025-03-10 PROCEDURE — 88184 FLOWCYTOMETRY/ TC 1 MARKER: CPT

## 2025-03-10 PROCEDURE — 88189 FLOWCYTOMETRY/READ 16 & >: CPT | Mod: ,,, | Performed by: PATHOLOGY

## 2025-03-10 PROCEDURE — 88341 IMHCHEM/IMCYTCHM EA ADD ANTB: CPT | Performed by: PATHOLOGY

## 2025-03-10 PROCEDURE — 88185 FLOWCYTOMETRY/TC ADD-ON: CPT | Mod: 59 | Performed by: PATHOLOGY

## 2025-03-10 PROCEDURE — 88341 IMHCHEM/IMCYTCHM EA ADD ANTB: CPT | Mod: 26,,, | Performed by: PATHOLOGY

## 2025-03-10 PROCEDURE — 88313 SPECIAL STAINS GROUP 2: CPT | Performed by: PATHOLOGY

## 2025-03-10 PROCEDURE — 88342 IMHCHEM/IMCYTCHM 1ST ANTB: CPT | Mod: 26,59,, | Performed by: PATHOLOGY

## 2025-03-10 PROCEDURE — 38222 DX BONE MARROW BX & ASPIR: CPT | Mod: RT,S$GLB,,

## 2025-03-10 PROCEDURE — 88184 FLOWCYTOMETRY/ TC 1 MARKER: CPT | Mod: 59 | Performed by: PATHOLOGY

## 2025-03-10 PROCEDURE — 88305 TISSUE EXAM BY PATHOLOGIST: CPT | Mod: 26,,, | Performed by: PATHOLOGY

## 2025-03-10 PROCEDURE — 36415 COLL VENOUS BLD VENIPUNCTURE: CPT | Performed by: STUDENT IN AN ORGANIZED HEALTH CARE EDUCATION/TRAINING PROGRAM

## 2025-03-10 PROCEDURE — 88305 TISSUE EXAM BY PATHOLOGIST: CPT | Performed by: PATHOLOGY

## 2025-03-10 RX ORDER — LIDOCAINE HYDROCHLORIDE 20 MG/ML
8 INJECTION, SOLUTION INFILTRATION; PERINEURAL
Status: COMPLETED | OUTPATIENT
Start: 2025-03-10 | End: 2025-03-10

## 2025-03-10 RX ADMIN — LIDOCAINE HYDROCHLORIDE 8 ML: 20 INJECTION, SOLUTION INFILTRATION; PERINEURAL at 02:03

## 2025-03-10 NOTE — PROGRESS NOTES
INTERNAL MEDICINE ESTABLISHED PATIENT VISIT NOTE    Subjective:     Chief Complaint: Follow-up  HTN, HR, BM bx     Patient ID: Amna Chawla is a 59 y.o. female with hx CVA and TIA c residual cognitive deficits (most recently c TIA 9/2018 per pt, has mild B weakness from several strokes in the past), carotid a disease, HTN, paroxysmal A fib c LAE, hx sudden cardiac arrest c VF and no ischemic heart disease and preserved EF (2013), intermittent 3rd deg AV block and symptomatic LVEF of 40% in setting of normal PET stress and chronic RV pacing s/p CRT-D, HLD, thyroid nodule s/p FNA 7/2018 c path c/w benign follicular nodule, depression with anxiety followed by psych, chronic complex h/a c occipital neuralgia followed by Neuro and on mult meds and has also had tx c botox, GERD c hiatal hernia, B carpal tunnel s/p release B, hx L middle ring finger trigger finger s/p release, SHIRA on APAP 6-20cm followed in sleep clinic, insomnia, prediabetes, last seen by me a month ago for hospital follow up, here today for f/u HTN and HR.    To review, was hospitalized at [facility] for apparent gastroenteritis in late Jan/early Feb.  While inpatient had MM labs done due to protein gap and noted to have a monoclonal gammopathy so was seen by Heme-Onc and had BM bx done yesterday, results pending.    At BM bx appt, was noted to be bradycardic c HR 48 but has not had w/u for this.  Of note, currently on Amlodipine, Losartan, and Coreg for HTN.  States she has been feeling tired since previous hospitalization.    Past Medical History:  Past Medical History:   Diagnosis Date    Anticoagulant long-term use     Anxiety     Asthma     Atrial fibrillation     Brain anoxic injury     Cervicalgia 8/28/2014    CHI (closed head injury) 2/19/2013    Convulsion 5/30/2015    Decreased ROM of left shoulder 4/12/2017    Defibrillator activation 2013    Depression     Heart block     History of sudden cardiac arrest 2/2013    PEA arrest with subsequent  long-QT    Hx of psychiatric care     Hypertension     Left atrial enlargement 4/11/2018    Pacemaker 2013    Paresthesia 11/1/2013    Prolonged Q-T interval on ECG 2/8/2013    Psychiatric problem     Seizures     Sleep difficulties     Stroke     weakness lt side    Therapy     Thyroid disease     Upper airway resistance syndrome 2/21/2017       Home Medications:  Prior to Admission medications    Medication Sig Start Date End Date Taking? Authorizing Provider   amLODIPine (NORVASC) 5 MG tablet Take 1 tablet (5 mg total) by mouth once daily. 2/26/25 2/26/26  Tiffany Mina MD   carvediloL (COREG) 12.5 MG tablet Take 1 tablet (12.5 mg total) by mouth 2 (two) times daily. 2/2/25 2/2/26  Augusto Garrido MD   clonazePAM (KLONOPIN) 1 MG tablet TAKE 1 TABLET BY MOUTH DAILY AS NEEDED FOR ANXIETY 7/2/24   Kade Huffman III, NP   diazePAM (VALIUM) 5 MG tablet Take 1 tablet (5 mg total) by mouth once as needed for Anxiety (Prior to bone marrow biopsy). 3/10/25 3/10/25  La Anderson MD   dicyclomine (BENTYL) 10 MG capsule Take 1 capsule (10 mg total) by mouth 2 (two) times daily as needed (For abdominal cramping). 2/26/25 3/28/25  Tiffany Mina MD   EPINEPHrine (EPIPEN) 0.3 mg/0.3 mL AtIn Inject 0.3 mLs (0.3 mg total) into the muscle once. for 1 dose 5/1/24 2/7/25  David Cortez MD   ergocalciferol (ERGOCALCIFEROL) 50,000 unit Cap Take 1 capsule (50,000 Units total) by mouth every 7 days. 2/27/25   Tiffany Mina MD   furosemide (LASIX) 20 MG tablet Take 1 tablet (20 mg total) by mouth daily as needed. Take as needed for weight gain (2-3 lbs/day or 5 lbs/week), shortness of breath, or leg swelling 1/17/25 1/17/26  Balaji MD   losartan (COZAAR) 100 MG tablet Take 1 tablet (100 mg total) by mouth once daily. 2/26/25 2/26/26  Tiffany Mina MD   omeprazole (PRILOSEC) 40 MG capsule Take 1 capsule (40 mg total) by mouth once daily. Take 30 minutes before breakfast 2/26/25 2/26/26  Tiffany Mina MD   ondansetron (ZOFRAN-ODT) 4 MG  "TbDL Dissolve 2 tablets (8 mg total) by mouth every 6 (six) hours as needed. 2/2/25   Augusto Garrido MD   polyethylene glycol (GLYCOLAX) 17 gram PwPk Take 17 g by mouth once daily. 7/6/23   David Cortez MD   potassium chloride SA (KLOR-CON M10) 10 MEQ tablet Take 2 tablets (20 mEq total) by mouth once daily. 2/27/25   Tiffany Mina MD   rivaroxaban (XARELTO) 20 mg Tab Take 1 tablet (20 mg total) by mouth daily with dinner or evening meal. 11/13/24   Avelino Mejia MD   rosuvastatin (CRESTOR) 40 MG Tab Take 1 tablet (40 mg total) by mouth every evening. 1/17/25   Balaji MD   tiaGABine (GABITRIL) 4 MG tablet Take 1 tablet (4 mg total) by mouth every evening. 2/2/25   Augusto Garrido MD   zolpidem (AMBIEN) 10 mg Tab Take 1 tablet (10 mg total) by mouth nightly as needed (insomnia). 7/2/24   Kade Huffman III, NP   metFORMIN (GLUCOPHAGE) 500 MG tablet Take 1 tablet (500 mg total) by mouth 2 (two) times daily with meals.  Patient not taking: Reported on 1/14/2025 4/19/22 6/2/22  Tiffany Mina MD       Allergies:  Review of patient's allergies indicates:   Allergen Reactions    Aspirin Hives    Imitrex [sumatriptan] Palpitations    Penicillins Hives and Swelling    Shellfish containing products Anaphylaxis     seafood    Reglan [metoclopramide hcl] Other (See Comments)     Parkinsonism        Social History:  Social History[1]     Review of Systems   Constitutional:  Positive for fatigue. Negative for chills and fever.   Respiratory:  Negative for cough, chest tightness and shortness of breath.    Cardiovascular:  Negative for chest pain.   Gastrointestinal:  Negative for abdominal pain and blood in stool.   Genitourinary:  Negative for dysuria and frequency.   Musculoskeletal:  Positive for arthralgias (chronic) and back pain (chronic).   Neurological:  Positive for headaches (chronic).         Health Maintenance:     Not addressed today    Objective:   BP (!) 102/58   Pulse 86   Ht 5' 4" (1.626 m)   Wt " 132 kg (291 lb 0.1 oz)   LMP 01/03/2016   SpO2 98%   BMI 49.95 kg/m²        General: AAO x3, no apparent distress  CV: RRR, no m/r/g  Pulm: Lungs CTAB, no crackles, no wheezes      Labs:     Lab Results   Component Value Date    WBC 3.09 (L) 03/10/2025    HGB 10.7 (L) 03/10/2025    HCT 33.4 (L) 03/10/2025    MCV 94 03/10/2025     03/10/2025     Sodium   Date Value Ref Range Status   02/26/2025 137 136 - 145 mmol/L Final     Potassium   Date Value Ref Range Status   02/26/2025 3.4 (L) 3.5 - 5.1 mmol/L Final     Chloride   Date Value Ref Range Status   02/26/2025 110 95 - 110 mmol/L Final     CO2   Date Value Ref Range Status   02/26/2025 22 (L) 23 - 29 mmol/L Final     Glucose   Date Value Ref Range Status   02/26/2025 109 70 - 110 mg/dL Final     BUN   Date Value Ref Range Status   02/26/2025 14 6 - 20 mg/dL Final     Creatinine   Date Value Ref Range Status   02/26/2025 0.9 0.5 - 1.4 mg/dL Final     Calcium   Date Value Ref Range Status   02/26/2025 8.7 8.7 - 10.5 mg/dL Final     Total Protein   Date Value Ref Range Status   02/02/2025 9.0 (H) 6.0 - 8.4 g/dL Final     Albumin   Date Value Ref Range Status   02/02/2025 2.4 (L) 3.5 - 5.2 g/dL Final     Total Bilirubin   Date Value Ref Range Status   02/02/2025 0.3 0.1 - 1.0 mg/dL Final     Comment:     For infants and newborns, interpretation of results should be based  on gestational age, weight and in agreement with clinical  observations.    Premature Infant recommended reference ranges:  Up to 24 hours.............<8.0 mg/dL  Up to 48 hours............<12.0 mg/dL  3-5 days..................<15.0 mg/dL  6-29 days.................<15.0 mg/dL       Alkaline Phosphatase   Date Value Ref Range Status   02/02/2025 43 40 - 150 U/L Final     AST   Date Value Ref Range Status   02/02/2025 14 10 - 40 U/L Final     ALT   Date Value Ref Range Status   02/02/2025 11 10 - 44 U/L Final     Anion Gap   Date Value Ref Range Status   02/26/2025 5 (L) 8 - 16 mmol/L Final      eGFR if    Date Value Ref Range Status   05/17/2022 >60.0 >60 mL/min/1.73 m^2 Final     eGFR if non    Date Value Ref Range Status   05/17/2022 >60.0 >60 mL/min/1.73 m^2 Final     Comment:     Calculation used to obtain the estimated glomerular filtration  rate (eGFR) is the CKD-EPI equation.        Lab Results   Component Value Date    HGBA1C 6.2 (H) 02/26/2025     Lab Results   Component Value Date    LDLCALC 56.8 (L) 02/26/2025     Lab Results   Component Value Date    TSH 1.128 02/26/2025         Assessment/Plan     Amna was seen today for follow-up.    Diagnoses and all orders for this visit:    Fatigue, unspecified type  Hypotension, unspecified hypotension type  Suspect fatigue likely due to low BP  Will stop Amlodipine 5 mg and cont all other BP meds.  Pt has home cuff so advised to monitor pressures this week and let me know if any issues.  Also advised to rest and cont hydration.  Awaiting BM bx results and has f/u c H/O for this.    HM as above  RTC in Aug as scheduled, sooner if needed.    Tiffany Mina MD  Department of Internal Medicine - [facility]  03/11/2025         [1]   Social History  Tobacco Use    Smoking status: Never     Passive exposure: Never    Smokeless tobacco: Never   Substance Use Topics    Alcohol use: Not Currently    Drug use: No

## 2025-03-10 NOTE — PROCEDURES
Bone marrow    Date/Time: 3/10/2025 1:45 PM    Performed by: Vandana Rajan FNP-C  Authorized by: Vandana Rajan FNP-C    Aspiration?: Yes   Biopsy?: Yes      PROCEDURE NOTE:  Bone Marrow aspiration and biopsy  Indication: Plasma cell dyscrasia  Consent: Informed consent was obtained from patient.  Timeout: Done and documented.  Position: Prone  Site: Right iliac crest  Prep: Betadine.  Needle used: 11 gauge Jamshidi needle.  Anesthetic: 2% lidocaine 8 cc.  Biopsy: The biopsy needle was introduced into the marrow cavity and 20 mL of  aspirate was obtained without complications and sent for flow, cytogenetics, PCPD FISH, DNA hold, MRD. Core biopsy obtained without difficulty and sent for routine histologic examination.  Complications: None.  EBL: minimal  Disposition: The patient was discharged home after 20 minutes.

## 2025-03-11 ENCOUNTER — OFFICE VISIT (OUTPATIENT)
Dept: INTERNAL MEDICINE | Facility: CLINIC | Age: 60
End: 2025-03-11
Payer: MEDICARE

## 2025-03-11 VITALS
DIASTOLIC BLOOD PRESSURE: 58 MMHG | SYSTOLIC BLOOD PRESSURE: 102 MMHG | BODY MASS INDEX: 49.68 KG/M2 | WEIGHT: 291 LBS | HEART RATE: 86 BPM | OXYGEN SATURATION: 98 % | HEIGHT: 64 IN

## 2025-03-11 DIAGNOSIS — R53.83 FATIGUE, UNSPECIFIED TYPE: Primary | ICD-10-CM

## 2025-03-11 DIAGNOSIS — I95.9 HYPOTENSION, UNSPECIFIED HYPOTENSION TYPE: ICD-10-CM

## 2025-03-11 LAB
BODY SITE - BONE MARROW: NORMAL
CLINICAL DIAGNOSIS - BONE MARROW: NORMAL
FLOW CYTOMETRY ANTIBODIES ANALYZED - BONE MARROW: NORMAL
FLOW CYTOMETRY COMMENT - BONE MARROW: NORMAL
FLOW CYTOMETRY INTERPRETATION - BONE MARROW: NORMAL
PCPDS FINAL DIAGNOSIS: NORMAL
PCPDS PRE-ANALYSIS PRE-SORT: NORMAL

## 2025-03-11 PROCEDURE — 1160F RVW MEDS BY RX/DR IN RCRD: CPT | Mod: CPTII,S$GLB,, | Performed by: INTERNAL MEDICINE

## 2025-03-11 PROCEDURE — 99213 OFFICE O/P EST LOW 20 MIN: CPT | Mod: S$GLB,,, | Performed by: INTERNAL MEDICINE

## 2025-03-11 PROCEDURE — 3078F DIAST BP <80 MM HG: CPT | Mod: CPTII,S$GLB,, | Performed by: INTERNAL MEDICINE

## 2025-03-11 PROCEDURE — 1159F MED LIST DOCD IN RCRD: CPT | Mod: CPTII,S$GLB,, | Performed by: INTERNAL MEDICINE

## 2025-03-11 PROCEDURE — 99999 PR PBB SHADOW E&M-EST. PATIENT-LVL V: CPT | Mod: PBBFAC,,, | Performed by: INTERNAL MEDICINE

## 2025-03-11 PROCEDURE — 3074F SYST BP LT 130 MM HG: CPT | Mod: CPTII,S$GLB,, | Performed by: INTERNAL MEDICINE

## 2025-03-11 PROCEDURE — 4010F ACE/ARB THERAPY RXD/TAKEN: CPT | Mod: CPTII,S$GLB,, | Performed by: INTERNAL MEDICINE

## 2025-03-11 PROCEDURE — 3008F BODY MASS INDEX DOCD: CPT | Mod: CPTII,S$GLB,, | Performed by: INTERNAL MEDICINE

## 2025-03-11 PROCEDURE — 3044F HG A1C LEVEL LT 7.0%: CPT | Mod: CPTII,S$GLB,, | Performed by: INTERNAL MEDICINE

## 2025-03-12 ENCOUNTER — TELEPHONE (OUTPATIENT)
Dept: HEMATOLOGY/ONCOLOGY | Facility: CLINIC | Age: 60
End: 2025-03-12
Payer: MEDICARE

## 2025-03-12 LAB
COMMENT: NORMAL
FINAL PATHOLOGIC DIAGNOSIS: NORMAL
GROSS: NORMAL
Lab: NORMAL
MICROSCOPIC EXAM: NORMAL

## 2025-03-12 NOTE — TELEPHONE ENCOUNTER
Spoke to pt via phone and let her know that not all of her bmbx results have come back and her appt on the 20th with dr yee is set to discuss in detail all of her labs/testing and her plan of care moving forward. Pt verbalized understanding of instructions.

## 2025-03-12 NOTE — TELEPHONE ENCOUNTER
----- Message from Charlie sent at 3/12/2025  1:26 PM CDT -----  Regarding: Consult/Advisory  Contact: [phone]  Consult/Advisory Name Of Caller: Amna Chawla  Contact Preference:  [phone] Nature of call: Pt is calling about test results she received

## 2025-03-13 ENCOUNTER — PROCEDURE VISIT (OUTPATIENT)
Dept: NEUROLOGY | Facility: CLINIC | Age: 60
End: 2025-03-13
Payer: MEDICARE

## 2025-03-13 VITALS
DIASTOLIC BLOOD PRESSURE: 80 MMHG | WEIGHT: 289.69 LBS | SYSTOLIC BLOOD PRESSURE: 124 MMHG | BODY MASS INDEX: 49.46 KG/M2 | HEART RATE: 44 BPM | HEIGHT: 64 IN

## 2025-03-13 DIAGNOSIS — G43.711 CHRONIC MIGRAINE WITHOUT AURA, WITH INTRACTABLE MIGRAINE, SO STATED, WITH STATUS MIGRAINOSUS: Primary | ICD-10-CM

## 2025-03-13 LAB
DNA/RNA EXTRACT AND HOLD RESULT: NORMAL
DNA/RNA EXTRACTION: NORMAL
EXHR SPECIMEN TYPE: NORMAL

## 2025-03-13 RX ORDER — TIZANIDINE 4 MG/1
4 TABLET ORAL EVERY 6 HOURS PRN
Qty: 60 TABLET | Refills: 12 | Status: SHIPPED | OUTPATIENT
Start: 2025-03-13 | End: 2025-04-12

## 2025-03-13 RX ADMIN — ONABOTULINUMTOXINA 200 UNITS: 100 INJECTION, POWDER, LYOPHILIZED, FOR SOLUTION INTRADERMAL; INTRAMUSCULAR at 02:03

## 2025-03-13 NOTE — PROCEDURES
Follow up:  Migraines are stable     Prior note:   Reports worsening LBP after COVID in Jan   On antibiotics for bronchitis     Prior note:   Reports nausea   Knees buckle frequently   No benefit from new shoes   Not Using cane     Prior note:   Reports overall worse HA   Feels tremors in whole body lasting 3-4 hrs   Tried ativan - helped for a few hours     Prior note:   She is grappling with grief of loss of her sister   Pending grief counseling      Prior note:   S/p course of prednisone for GI allergic reaction (scratch throat - seafood related)     Prior note:   Migraine Hz increased during storm - almost daily   One episode of lightheadedness. No SOB, CP       Prior note:   Reports episode of lightheadedness 2 days ago      Prior note:   S/p COVID vaccine      Prior note:   Reports more physical fatigue   taking 2 naps a day   L sided severe migraine lasted 2 days. Took ubrelvy, motrin, zanaflex      Prior note:   3 days of severe L sided HA + nausea   Gradual onset   Ubrelvy, zanaflex, flurbiprofen - not helping   Vision - nml      Prior note:   HA stable   Try Ubrelvy again      Prior note:   independent left and right parietal ice prick HA      Prior note:   HA much improved   Only 2 since last visit      Prior note:   HA are more frequent   Taking 1600 mg motrin + zanaflex   Went to ER recently      Prior note:   HA overall stable in Hz and intensity   Reports a wearing off effect of BOTOX since last week   Taking zanaflex 8 mg TID        Prior note:   HA started 12/24   She feels BOTOX wore off last week   Reports GI distress from taking to many motrin      Prior note:   4/week HA - L vertex   Jabs - vertex either sides      She reports migraine that woke her up in the morning - associated with L side facial droop      Prior note:   She gets acceptable relief for 2.5 months after BOTOX      Prior note:   No recurrent stroke symptoms   starting having whole body tremors since this AM. Took 1 tablet of  lorazepam   Last night had a HA, took trazodone       Admit Date: 4/11/2018  2:03 PM   Discharge Date and Time: 4/12/2018  8:30 PM   History of Present Illness: Ms. Chawla is a 51 yo F with afib on Eliquis (last dose this am), prior cardiac arrest with associated acute ischemic stroke with residual mild L sided weakness, CAD with ICD in situ, HTN, and HLD who presented with acute R sided weakness and sensation changes.  She originally called EMS for palpitations, but developed acute right sided facial droop and RUE weakness at 1:40pm today, after EMS had arrived.  She denies changes to speech or vision.  SBP en route 200/100.  She is ineligible for tPA 2/2 Eliquis use.  She is not suspected to have an LVO.  She will be admitted to [facility] for observation overnight.     Hospital Course (synopsis of major diagnoses, care, treatment, and services provided during the course of the hospital stay): Ms. Chawla was admitted for acute stroke workup. Pt with pacemaker so unable to obtain MRI Brain; CTA H/N demonstrated no evidence acute infarction, though did exhibit noncalcified plaquing of carotid bifurcations and proximal ICAs with < 50% stenosis, so Plavix was added to pt's daily home regimen. Concerned for TIA at this time though Migraine very high on differential due to pt's hx headaches, including presently this admission. Hypertensive urgency/emergency also considered due to pt's very elevated levels with EMS. Per chart review, Eliquis was a new medication since 4/3/18 (had switched from Coumadin), however staff Faraz concerned that pt had a potential event while on Eliquis. She wished to switch to Pradaxa as an alternative DOAC (as it has an option for reversal), however changed to Xarelto per pt's insurance coverage.   While admitted, pt given cocktail for HA which she has received previously at the infusion clinic - IV Magnesium, IM Toradol, 2mg IV Morphine, 500cc NS bolus (IV Benadryl not included this time as pt  had received a dose earlier that day prior to contrast imaging.) HA improved somewhat and pt was encouraged to follow up with Dr. Cortez for continued management of botox injections, etc. At that time, pt also found with hyponatremia; d/c'd Clorthalidone and plan was made for pt [facility]16[facility]18 for repeat CMP to evaluate Na level and BP check since discontinuing this medication.  Ms. Chawla was evaluated and treated by PT, OT, and SLP who recommend d/c with outpatient PT and OT. She was discharged home 4/12/18 with [facility] follow-up Monday      Prior note:   2 weeks ago BOTOX effect wore off   Used up all her percocet   3 wks h/o:   Reports frequent falls - falling forward, no LOC, no injuries, able to protect falls by hands   triggers - unknown   Also occ stumbling   Dragging L foot   No Pain in back or legs   No numbness or weakness in legs   No B/B incontinence   No lightheadedness      Prior note:    Flare up of TMJ and HA during daughter's wedding   NSAIDS helped   2 weeks ago BOTOX effect wore off      Prior note:   2 weeks ago BOTOX effect wore off   Has severe spasm and pain in the traps catalina   Weather change has provoked severe migraine + nausea   She started coumadin       Prior note:   3 weeks ago BOTOX effect wore off   She reports severe daily HA    During BOTOX periods she feels she  her HA. Much less intense      Prior note:   The patient is a 51-year-old female who [facility]. Since her last visit, she reports growing frustration   about the fact that nothing has helped her with regards to her headaches. She   continues to experience daily headaches. She takes mefenamic acid and   tizanidine every night, which only provides her two hours of relief. She is   unable to sleep because of the refractory headaches. She is incapacitated   because of severe headaches. She reports minimal relief with infusions  "that she  gets on a periodic basis. She does report an improvement overall with the   Botox. However, this effect has worn off in the last two weeks. She also   reports an episode wherein she fell with loss of consciousness, which was not   provoked. As a result, she injured her ankle and is currently wearing a boot.      Prior note:   since last week - Reports vertigo for 6 - 7 minutes upto 3 times daily   Nearly daily severe HA - no response to ponstel , compazine   She is late for her BOTOX - last 2 weeks were painful       Follow up:   R sided Headache is constant max at TMJ on R   Prior note:   She reports new episodes of R face tingling. Sh also reports 2 episodes of blurred vision lasting 15 minutes. These hapended while watching TV. Her pain remains unchanged   Follow up:   2 days of severe HA during Thanksgiving   Pounding bifrontal region   Pain worse over L eye brow   Follow up:   Reports bifrontal severe HA (worse on L) with no nausea with sudden onset . Occurs daily   NO note:  I found no papilledema or other changes to suggest elevated intracranial pressure or other alternative etiology for her headaches. Repeat exam in two years.  HA are less frequent and less intense.   (R levator scapula spasm)   symptoms better with lateral flexion to L   Prior note:   She still has daily HA with severe sharp stabbing pain behind L eye lasting for 15 sec   Prior note:   Daily pain. 2/week - nausea.  Shu Lares RN at 9/17/2014 4:53 PM  Pt presented to clinic for medical advice. Pt is seen by Dr. Paredes and Dr. Cortez.  1) She went ER on 9/13/14 for a migraine. She was given Reglan, Benadryl, MSO4 and IVF. A couple days later she developed an all over body "tremor". She states "I think I have Parkinson's". - Per Dr. Paredes, medication related tremor (Reglan & Benadryl). Informed her it should wear off in a few days. If not, she is to call and let us know.  2) Headaches - triggered by insomnia.   Current " "meds:  Ketoprofen - she took it once and said her "throat closed up" and she's not supposed to have anything with aspirin in it.  Nortriptyline - she was taking it at night, but it didn't help her sleep, so she stopped it.  Per Dr. Cortez, discontinue Ketoprofen and Nortriptyline. Start Effexor XR 37.5mg QD, tizanidine 4mg BID PRN headache and Klonopin 0.25 - 0.5mg QHS PRN. Will schedule pt for sphenocath procedure within the next week.   Prior note:   48 yo woman with history of HTN and asthma, both reportedly controlled, in hospital early 2013 after "passed out" and became unresponsive. CPR in the field for 8 minutes until EMS arrived. Per ED note, on arrival she was found to be in PEA, given epinephrine. Vfib/Vtach was achieved which was shocked x1. Patient converted to sinus tachycardia after shock. I do not know the time course for the above treatment. On arrival to facility patient was in sinus tachycardia with strong pulses, intubated and unresponsive. ET tube placement was confirmed with direct laryngoscopy and good bilateral breath sounds were heard after the tube was pulled back 2 cm. Reported to have "withdrawal" movements in ER during stabilization, then sedated and cooling protocol initiated. Had remarkable   recovery and first seen in clinic in April 2013.   Headache history: cluster   Age of onset - 2012   Location - behind L eye   Nature of pain - sharp   Prodrome - no   Aura - No   Duration of headache - 6-7 min   Time to peak intensity -   Pain scale - range of intensity - 9/10   Frequency - daily   Status Migrainosus history - 1-3 days   Time of day of most headaches- anytime   Associated symptoms with the headache:   Red eyes   swelling of L face   Headache history: Migraine   Age of onset - 2012   Location - bifrontal   Nature of pain - Pounding   Prodrome - no   Aura - No   Duration of headache - > 4 hrs   Time to peak intensity -   Pain scale - range of intensity - 9/10   Frequency - 5/week " "  Status Migrainosus history - 1-3 days   Time of day of most headaches- anytime   Associated symptoms with the headache:   Meningeal symptoms - photophobia, phonophobia, exercise intolerance +   Nausea/vomitting +   Nasal drainage   Visual blurriness +   Pallor/flushing   Dizziness   Vertigo   Confusion   Impaired concentration +   Pain worsened with physical activity +   Neck pain +   Symptoms of increased intracranial pressure:   Whooshing sounds - no   Visual spots/blotches - no   Headache Triggers: unknown   Other comorbid conditions:   Anxiety - no   Motion sickness symptom - no       Treatment history:   Resolution of headache with sleep - yes       Meds tried:   Trigger points involvin/9/15 helped for 1 day   Site: Right   Levator scapula   Pharmacological agent:   0.5% Sensorcaine solution   No complications were noted.  Supraorbital block - helped for 2 days   Tylenol - not help   imitrex - chest tightness   alleve - help   topamax - not helping   Neurontin - not helping   Paxil, Elavil - not help   seroquel - nightmare   Ketoprofen - she took it once and said her "throat closed up" and she's not supposed to have anything with aspirin in it.  Flurbiprofen - constipation   Nortriptyline - she was taking it at night, but it didn't help her sleep, so she stopped it.  reglan - parkinsonism   zanaflex - help   Toradol 30 mg IM inj at home - too expensive   BOTOX round #1 9/3/15 - helped (R levator scapula spasm)   Round #2 12/3/15 - helped   Round#3 3/316 - helped   Round #4 16 - helped   BOTOX#5 9/15/16 - helped     BOTOX#6 - 16 - helped   BOTOX#7 - 3/9/17 - helped    BOTOX#8 - 17 - helped    BOTOX#9 8/10/17 - helped   BOTOX#10 10/19/17 - helped   BOTOX#11 17 - helped   BOTOX#12 3/7/18 - helped   BOTOX#13 18 - helped    BOTOX#14 18 - helped     BOTOX#15 10/19/18 - helped      BOTOX#16 18 - helped   BOTOX#17 3/13/19 - helped    BOTOX#18 19 - helped     BOTOX#19 " 8/1/19 - helped      BOTOX#20 10/11/19 - helped     BOTOX#21 12/19/19 - helped   BOTOX#22 3/10/20 - helped    BOTOX#23 6/26/20 - helped   BOTOX#24 11/12/20 - helped  BOTOX#25 1/21/21 - helped   BOTOX#26 4/22/21 - helped   BOTOX#27 7/6/21 - helped    BOTOX#28 12/10/21 - helped     BOTOX#29 5/19/22 - helped   BOTOX#30 8/25/22 - helped  BOTOX#31 12/2/22 - helped  BOTOX#32 3/1/23 - helped  BOTOX#33 7/6/23 - helped  BOTOX#34 9/28/23 - helped  BOTOX#35 1/4/24 - helped  BOTOX#36 5/1/24 - helped  BOTOX#37 7/18/24 - helped  BOTOX#38 10/10/24 - helped  BOTOX#39 12/27/24 - helped    AIMOVIG (sept 1rst week, Oct first week, Nov first wk 140 mg, Dec first wk 140 mg, Jan, Feb) no benefit   Constipation +      Can not do RFA - pt has pacemaker   Procedure: IOVERA peripheral nerve focus cold therapy (non ablative cryotherapy) 12/30/15 - not help   Indication: Cryotherapy of select peripheral nerves of the head was performed to treat chronic headaches that failed to respond to several preventive pharmacological therapies.   Sedation: None   Informed consent: The procedure, risks, benefits and options were discussed with the patient. There are no contraindications to the procedure. The patient expressed understanding and agreed to the procedure. I verify that I personally obtained the patient's consent prior to the start of the procedure.  Location:  Bilateral Supra orbital nerve (3 cycles on R and 1 cycle on L)   Bilateral Occipital nerve   3 cycles   Pain relief: Pain score reduced 10/10->0/10   9/26 Bilateral Sphenopalatine Ganglion and bilateral Maxillary Branch (Trigeminal Nerve) Block - no help   ONB - not help     georges Pagan RN at 12/31/2014 3:54 PM                           Status: Signed                                       Expand All Collapse All   HEADACHE FASTTRACK  PAIN 7/10 NAUSEA 0/10  ROUND 1: TORADOL, IMITREX, MORPHINE, BENADRYL, AND MAGNESIUM  PAIN 7/10 NAUSEA 0/10  PT STATED SHE WAS READY TO GO HOME AND GO TO  SLEEP. PT D/C'ED IN NAD                              Shannon Pagan RN at 10/5/2015 12:49 PM                           Status: Signed                                       Expand All Collapse All   HEADACHE FASTTRACK  PAIN 8/10 NAUSEA 10/10  FIRST ROUND: tORADOL, BENADRYL, COMPAZINE, IMITREX, MAGNESIUM, AND VALPROATE.  PAIN 1/10 NAUSEA 0/10  PT READY TO GO HOME AND FEELING BETTER. PT LEFT IN NAD WITH FAMILY MEMBER  TOTAL TIME: 4420-8874                         Shannon Pagan RN at 10/20/2015 3:05 PM                           Status: Signed                                       Expand All Collapse All   HEADACHE FASTTRACK  PAIN 10/10 NAUSEA 0/10  FIRST ROUND: tORADOL, BENADRYL, COMPAZINE, IMITREX, MAGNESIUM, AND VALPROATE.  BP BEING MONITORED EVERY 15 MIN AND REMAINED 170'S/80-90'S. DR CHOPRA NOTIFIED AND STATED TO MAKE SURE BP DID NOT EXCEED 180 SYSTOLIC OR PT SHOULD REPORT TO ED. BP AT 1500 183/92. DR CHOPRA NOTIFIED AND GAVE ORDER TO STOP INFUSION AND SEND PATIENT TO ED. PT BROUGHT TO ED BY INFUSION NURSE VIA WHEELCHAIR.   PAIN 8/10 NAUSEA 0/10  TOTAL TIME: 9674-5551                                                     Progress Notes                                               Shannon Pagan RN at 2/24/2016 1:36 PM       Status: Signed                  Expand All Collapse All   HEADACHE FASTTRACK  PAIN 10/10 NAUSEA 7/10  FIRST ROUND: tORADOL, BENADRYL, AND MORPHINE-BP RANGING FROM 177-203/84-92, HR 60-82  MD NOTIFIED AND GAVE ORDERS TO SEND TO ED. PT LEFT VIA W/C WITH INFUSION NURSE ON WAY TO ED.            Headache risk factors:   H/o TBI - [facility] (2013) + neck sprain   H/o Meningitis - no   F/h Aneurysms - no       Review of Systems   Constitutional: Negative.   HENT: Negative.   Eyes: Negative.   Respiratory: Negative.   Cardiovascular: Negative.   Gastrointestinal: Negative.   Endocrine: Negative.   Genitourinary: Negative.   Musculoskeletal: Negative.   Skin: Negative.   Allergic/Immunologic: Negative.    Hematological: Negative.   Psychiatric/Behavioral: insomnia      Objective:     Physical Exam   Constitutional: She is oriented to person, place, and time. She appears well-developed.   obesity   Psychiatric: She has a normal mood and flat affect. Her behavior is normal. Judgment and thought content normal.   Headache Exam:  Optic disc is flat OU   Temples appear symmetric  No V1,V2,V3 distribution allodynia/hyperalgesia catalina   No facial swelling  No gross TMJ abnormalities   No ptosis   No conj injection   No meningismus   No neck stiffness  No C spine tenderness  Cervical facet tenderness on palpation catalina   No tender points over trapezium   catalina supraorbital nerve exit point tenderness  catalina occipital nerve exit point tenderness  No auriculotemporal nerve tenderness   Tenderness of levator scapula catalina    Gait - wide based gait                       Assessment:                              1.  Occipital neuralgia                              2.  Cervicalgia                              3.  4  5  6 Chronic migraine without aura, with intractable migraine, so stated, with status migrainosus (post traumatic) post concussive?  Depression   TMJ - R sided   Insomnia - SHIRA      Head CT  Findings: There is no evidence of acute or recent major vascular territory cerebral infarction, parenchymal hemorrhage, or intra-axial mass.  There are no extra-axial masses or abnormal fluid collections.  The ventricular system is within normal limits of size for age and shows no distortion by mass-effect or evidence of hydrocephalus.  There is no fracture or destructive osseous lesion.  The extracranial soft tissues are unremarkable.  The visualized paranasal sinuses and mastoid air cells are clear.   Impression    No acute intracranial abnormality.  ______________________________________     Electronically signed by resident: KRISSY MAYERS MD  Date: 03/14/16  Time: 17:09            Plan:                              1. Prophylactic  medication -   Despiramine 25 mg AM (for dizziness) PRN  zoloft 25 mg daily    Aricept 20 mg daily   Neurontin 900 mg TID    Magnesium 200 mg daily  Topamax 200 mg BID   Clonazepam BID for anxiety PRN  On trazodone   Cont B2, B6 supplements  On bellsomra    2, Breakthrough headache - zanaflex 8 mg Q8, etodolac - insurance will not pay for no clinical reason   Diclofenac   Multiple alternative treatment options were offered to the patient   3. Refrain from over counter pain medications. Discussed medication overuse headache.cont Magnesium 400 mg PO BID   4. Occipital neuralgia - If medical management is ineffective may consider occipital nerve blocks.   5. I again urged the patient to keep a headache calender.    f/up Dr. Rock for TBI   Vit D supplements    f/up sleep clinic - CPAP - waiting for new machine   Cont AJOVY (April 2019- ) - gap Feb-April 2021)  No side effects     Consider  ONB 2 weeks prior to next BOTOX       BOTOX treatment response:   Prior to initiating BOTOX, the patient had 28   migraine days per month on average. This meets criteria for chronic migraine.   After starting BOTOX, the patient experienced a reduction in  25  days per month   After starting BOTOX, the patient experienced a reduction in > 100 hours of migraine symptoms per month   After starting BOTOX, the patient experienced a 50% reduction in burden of migraine days per month   Based on this information, BOTOX is medically necessary for the management of the patient's chronic migraine.     BOTOX Injection intervals:   Patient reports a 'wearing off effect' prior to the subsequent BOTOX injections at 12 weeks. This occurs at week 9-10  Symptoms of this a 'wearing off effect' include - worsening of migraine headache frequency and intensity   Medications used during the 'wearing off effect' period include flurbiprofen, zanaflex  Based on this information, it is medically necessary for the patient to receive BOTOX therapy at an  interval of every 10 weeks for the management of chronic migraine.    BOTOX was performed as an indicated therapy for intractable chronic migraine headaches given that the patient failed > 5 prophylactic medications  Botulinum Toxin Injection Procedure   Pre-operative diagnosis: Chronic migraine   Post-operative diagnosis: Same   Procedure: Chemical neurolysis   After risks and benefits were explained including bleeding, infection, worsening of pain, damage to the areas being injected, weakness of muscles, loss of muscle control, dysphagia if injecting the head or neck, facial droop if injecting the facial area, painful injection, allergic or other reaction to the medications being injected, and the failure of the procedure to help the problem, a signed consent was obtained.   The patient was placed in a comfortable area and the sites to be treated were identified.The area to be treated was prepped three times with alcohol and the alcohol allowed to dry. Next, a 30 gauge needle was used to inject the medication in the area to be treated.   Area(s) injected:   Total Botox used: 155 Units   Botox wastage: 45 Units   Injection sites:     muscle bilaterally ( a total of 5 units divided into 2 sites)   Procerus muscle (5 units)   Frontalis muscle bilaterally (a total of 20 units divided into 4 sites)   Temporalis muscle bilaterally (a total of 50 units divided into 8 sites)   Occipitalis muscle bilaterally (a total of 30 units divided into 6 sites)   Cervical paraspinal muscles (a total of 20 units divided into 4 sites)   Trapezius muscle bilaterally (a total of 30 units divided into 6 sites)     Complications: none       RTC for the next Botox injection: 10 weeks    Procedures

## 2025-03-14 ENCOUNTER — TELEPHONE (OUTPATIENT)
Facility: CLINIC | Age: 60
End: 2025-03-14
Payer: MEDICARE

## 2025-03-14 DIAGNOSIS — G43.711 CHRONIC MIGRAINE WITHOUT AURA, WITH INTRACTABLE MIGRAINE, SO STATED, WITH STATUS MIGRAINOSUS: Primary | ICD-10-CM

## 2025-03-14 LAB
GENETICIST REVIEW: NORMAL
PLASMA CELL PROLIF RELEASED BY: NORMAL
PLASMA CELL PROLIF RESULT SUMMARY: NORMAL
PLASMA CELL PROLIF RESULT TABLE: NORMAL
REASON FOR REFERRAL, PLASMA CELL PROLIF (PCPD), FISH: NORMAL
REF LAB TEST METHOD: NORMAL
RESULTS, PLASMA CELL PROLIF (PCPD), FISH: NORMAL
SERVICE CMNT-IMP: NORMAL
SERVICE CMNT-IMP: NORMAL
SPECIMEN SOURCE: NORMAL
SPECIMEN, PLASMA CELL PROLIF (PCPD), FISH: NORMAL

## 2025-03-17 DIAGNOSIS — F41.9 ANXIETY: ICD-10-CM

## 2025-03-17 DIAGNOSIS — G47.00 INSOMNIA, UNSPECIFIED TYPE: ICD-10-CM

## 2025-03-17 RX ORDER — ZOLPIDEM TARTRATE 10 MG/1
10 TABLET ORAL NIGHTLY PRN
Qty: 30 TABLET | Refills: 5 | Status: SHIPPED | OUTPATIENT
Start: 2025-03-17

## 2025-03-17 RX ORDER — CLONAZEPAM 1 MG/1
TABLET ORAL
Qty: 30 TABLET | Refills: 5 | Status: SHIPPED | OUTPATIENT
Start: 2025-03-17

## 2025-03-18 ENCOUNTER — LAB VISIT (OUTPATIENT)
Dept: LAB | Facility: HOSPITAL | Age: 60
End: 2025-03-18
Attending: INTERNAL MEDICINE
Payer: MEDICARE

## 2025-03-18 ENCOUNTER — TELEPHONE (OUTPATIENT)
Dept: HEMATOLOGY/ONCOLOGY | Facility: CLINIC | Age: 60
End: 2025-03-18
Payer: MEDICARE

## 2025-03-18 DIAGNOSIS — E88.09 PLASMA CELL DYSCRASIA: ICD-10-CM

## 2025-03-18 DIAGNOSIS — C90.00 MULTIPLE MYELOMA, REMISSION STATUS UNSPECIFIED: ICD-10-CM

## 2025-03-18 DIAGNOSIS — C90.00 MULTIPLE MYELOMA, REMISSION STATUS UNSPECIFIED: Primary | ICD-10-CM

## 2025-03-18 LAB
ABO + RH BLD: NORMAL
ALBUMIN SERPL BCP-MCNC: 2.9 G/DL (ref 3.5–5.2)
ALP SERPL-CCNC: 57 U/L (ref 40–150)
ALT SERPL W/O P-5'-P-CCNC: 16 U/L (ref 10–44)
ANION GAP SERPL CALC-SCNC: 4 MMOL/L (ref 8–16)
AST SERPL-CCNC: 14 U/L (ref 10–40)
BASOPHILS # BLD AUTO: 0.01 K/UL (ref 0–0.2)
BASOPHILS NFR BLD: 0.3 % (ref 0–1.9)
BILIRUB SERPL-MCNC: 0.3 MG/DL (ref 0.1–1)
BLD GP AB SCN CELLS X3 SERPL QL: NORMAL
BUN SERPL-MCNC: 13 MG/DL (ref 6–20)
CALCIUM SERPL-MCNC: 8.7 MG/DL (ref 8.7–10.5)
CHLORIDE SERPL-SCNC: 108 MMOL/L (ref 95–110)
CO2 SERPL-SCNC: 23 MMOL/L (ref 23–29)
CREAT SERPL-MCNC: 0.8 MG/DL (ref 0.5–1.4)
DIFFERENTIAL METHOD BLD: ABNORMAL
EOSINOPHIL # BLD AUTO: 0 K/UL (ref 0–0.5)
EOSINOPHIL NFR BLD: 0 % (ref 0–8)
ERYTHROCYTE [DISTWIDTH] IN BLOOD BY AUTOMATED COUNT: 15.6 % (ref 11.5–14.5)
EST. GFR  (NO RACE VARIABLE): >60 ML/MIN/1.73 M^2
GLUCOSE SERPL-MCNC: 102 MG/DL (ref 70–110)
HBV CORE AB SERPL QL IA: NORMAL
HBV SURFACE AG SERPL QL IA: NORMAL
HCT VFR BLD AUTO: 30.8 % (ref 37–48.5)
HGB BLD-MCNC: 10.1 G/DL (ref 12–16)
IGA SERPL-MCNC: 28 MG/DL (ref 40–350)
IGG SERPL-MCNC: 5267 MG/DL (ref 650–1600)
IGM SERPL-MCNC: 35 MG/DL (ref 50–300)
IMM GRANULOCYTES # BLD AUTO: 0 K/UL (ref 0–0.04)
IMM GRANULOCYTES NFR BLD AUTO: 0 % (ref 0–0.5)
LYMPHOCYTES # BLD AUTO: 1.3 K/UL (ref 1–4.8)
LYMPHOCYTES NFR BLD: 42.3 % (ref 18–48)
MCH RBC QN AUTO: 30.3 PG (ref 27–31)
MCHC RBC AUTO-ENTMCNC: 32.8 G/DL (ref 32–36)
MCV RBC AUTO: 93 FL (ref 82–98)
MONOCYTES # BLD AUTO: 0.4 K/UL (ref 0.3–1)
MONOCYTES NFR BLD: 13.3 % (ref 4–15)
NEUTROPHILS # BLD AUTO: 1.3 K/UL (ref 1.8–7.7)
NEUTROPHILS NFR BLD: 44.1 % (ref 38–73)
NRBC BLD-RTO: 0 /100 WBC
PLATELET # BLD AUTO: 211 K/UL (ref 150–450)
PMV BLD AUTO: 12.9 FL (ref 9.2–12.9)
POTASSIUM SERPL-SCNC: 3.9 MMOL/L (ref 3.5–5.1)
PROT SERPL-MCNC: 10 G/DL (ref 6–8.4)
RBC # BLD AUTO: 3.33 M/UL (ref 4–5.4)
SODIUM SERPL-SCNC: 135 MMOL/L (ref 136–145)
SPECIMEN OUTDATE: NORMAL
WBC # BLD AUTO: 3 K/UL (ref 3.9–12.7)

## 2025-03-18 PROCEDURE — 86334 IMMUNOFIX E-PHORESIS SERUM: CPT | Mod: 26,HCNC,, | Performed by: PATHOLOGY

## 2025-03-18 PROCEDURE — 86905 BLOOD TYPING RBC ANTIGENS: CPT | Mod: 91,HCNC | Performed by: INTERNAL MEDICINE

## 2025-03-18 PROCEDURE — 86334 IMMUNOFIX E-PHORESIS SERUM: CPT | Mod: HCNC | Performed by: INTERNAL MEDICINE

## 2025-03-18 PROCEDURE — 86901 BLOOD TYPING SEROLOGIC RH(D): CPT | Mod: HCNC | Performed by: INTERNAL MEDICINE

## 2025-03-18 PROCEDURE — 84165 PROTEIN E-PHORESIS SERUM: CPT | Mod: 26,HCNC,, | Performed by: PATHOLOGY

## 2025-03-18 PROCEDURE — 84165 PROTEIN E-PHORESIS SERUM: CPT | Mod: HCNC | Performed by: INTERNAL MEDICINE

## 2025-03-18 PROCEDURE — 87340 HEPATITIS B SURFACE AG IA: CPT | Mod: HCNC | Performed by: INTERNAL MEDICINE

## 2025-03-18 PROCEDURE — 82784 ASSAY IGA/IGD/IGG/IGM EACH: CPT | Mod: 59,HCNC | Performed by: INTERNAL MEDICINE

## 2025-03-18 PROCEDURE — 80053 COMPREHEN METABOLIC PANEL: CPT | Mod: HCNC | Performed by: INTERNAL MEDICINE

## 2025-03-18 PROCEDURE — 83521 IG LIGHT CHAINS FREE EACH: CPT | Mod: HCNC | Performed by: INTERNAL MEDICINE

## 2025-03-18 PROCEDURE — 86706 HEP B SURFACE ANTIBODY: CPT | Mod: HCNC | Performed by: INTERNAL MEDICINE

## 2025-03-18 PROCEDURE — 85025 COMPLETE CBC W/AUTO DIFF WBC: CPT | Mod: HCNC | Performed by: INTERNAL MEDICINE

## 2025-03-18 PROCEDURE — 86704 HEP B CORE ANTIBODY TOTAL: CPT | Mod: HCNC | Performed by: INTERNAL MEDICINE

## 2025-03-18 NOTE — TELEPHONE ENCOUNTER
----- Message from Lorena sent at 3/18/2025 10:53 AM CDT -----  Regarding: FW: Labs at [facility]  Contact: Pt @ [phone]  I was going to call and move them, but I wasn't sure with the labs that are attached if they have to be done here.  ----- Message -----  From: Zelda Masters  Sent: 3/18/2025  10:25 AM CDT  To: [facility]  Pool  Subject: Labs at [facility]                                Pt is calling to ask if she can take her labs at [facility] at 12noon instead on [address]. Please c/b to advise..thanks

## 2025-03-19 ENCOUNTER — TELEPHONE (OUTPATIENT)
Dept: HEMATOLOGY/ONCOLOGY | Facility: CLINIC | Age: 60
End: 2025-03-19
Payer: MEDICARE

## 2025-03-19 LAB
ALBUMIN SERPL ELPH-MCNC: 3.79 G/DL (ref 3.35–5.55)
ALPHA1 GLOB SERPL ELPH-MCNC: 0.31 G/DL (ref 0.17–0.41)
ALPHA2 GLOB SERPL ELPH-MCNC: 0.72 G/DL (ref 0.43–0.99)
B-GLOBULIN SERPL ELPH-MCNC: 0.77 G/DL (ref 0.5–1.1)
GAMMA GLOB SERPL ELPH-MCNC: 4.2 G/DL (ref 0.67–1.58)
INTERPRETATION SERPL IFE-IMP: NORMAL
KAPPA LC SER QL IA: 1.02 MG/DL (ref 0.33–1.94)
KAPPA LC/LAMBDA SER IA: 0.16 (ref 0.26–1.65)
LAMBDA LC SER QL IA: 6.43 MG/DL (ref 0.57–2.63)
PATHOLOGIST INTERPRETATION IFE: NORMAL
PATHOLOGIST INTERPRETATION SPE: NORMAL
PROT SERPL-MCNC: 9.8 G/DL (ref 6–8.4)

## 2025-03-20 ENCOUNTER — OFFICE VISIT (OUTPATIENT)
Dept: HEMATOLOGY/ONCOLOGY | Facility: CLINIC | Age: 60
End: 2025-03-20
Payer: MEDICARE

## 2025-03-20 VITALS
HEART RATE: 67 BPM | DIASTOLIC BLOOD PRESSURE: 65 MMHG | BODY MASS INDEX: 48.65 KG/M2 | TEMPERATURE: 98 F | HEIGHT: 64 IN | WEIGHT: 284.94 LBS | RESPIRATION RATE: 18 BRPM | OXYGEN SATURATION: 99 % | SYSTOLIC BLOOD PRESSURE: 119 MMHG

## 2025-03-20 DIAGNOSIS — C90.00 MULTIPLE MYELOMA NOT HAVING ACHIEVED REMISSION: Primary | ICD-10-CM

## 2025-03-20 DIAGNOSIS — C90.00 MULTIPLE MYELOMA NOT HAVING ACHIEVED REMISSION: ICD-10-CM

## 2025-03-20 PROCEDURE — 3074F SYST BP LT 130 MM HG: CPT | Mod: HCNC,CPTII,S$GLB, | Performed by: INTERNAL MEDICINE

## 2025-03-20 PROCEDURE — 99999 PR PBB SHADOW E&M-EST. PATIENT-LVL V: CPT | Mod: PBBFAC,HCNC,, | Performed by: INTERNAL MEDICINE

## 2025-03-20 PROCEDURE — 3008F BODY MASS INDEX DOCD: CPT | Mod: HCNC,CPTII,S$GLB, | Performed by: INTERNAL MEDICINE

## 2025-03-20 PROCEDURE — 3044F HG A1C LEVEL LT 7.0%: CPT | Mod: HCNC,CPTII,S$GLB, | Performed by: INTERNAL MEDICINE

## 2025-03-20 PROCEDURE — 4010F ACE/ARB THERAPY RXD/TAKEN: CPT | Mod: HCNC,CPTII,S$GLB, | Performed by: INTERNAL MEDICINE

## 2025-03-20 PROCEDURE — 1159F MED LIST DOCD IN RCRD: CPT | Mod: HCNC,CPTII,S$GLB, | Performed by: INTERNAL MEDICINE

## 2025-03-20 PROCEDURE — 99215 OFFICE O/P EST HI 40 MIN: CPT | Mod: HCNC,S$GLB,, | Performed by: INTERNAL MEDICINE

## 2025-03-20 PROCEDURE — 3078F DIAST BP <80 MM HG: CPT | Mod: HCNC,CPTII,S$GLB, | Performed by: INTERNAL MEDICINE

## 2025-03-20 PROCEDURE — G2211 COMPLEX E/M VISIT ADD ON: HCPCS | Mod: HCNC,S$GLB,, | Performed by: INTERNAL MEDICINE

## 2025-03-20 RX ORDER — LENALIDOMIDE 25 MG/1
25 CAPSULE ORAL DAILY
Qty: 21 CAPSULE | Refills: 0 | Status: SHIPPED | OUTPATIENT
Start: 2025-03-26

## 2025-03-20 RX ORDER — DEXAMETHASONE 4 MG/1
40 TABLET ORAL
Qty: 40 TABLET | Refills: 5 | Status: SHIPPED | OUTPATIENT
Start: 2025-03-26

## 2025-03-20 RX ORDER — ACYCLOVIR 400 MG/1
400 TABLET ORAL 2 TIMES DAILY
Qty: 60 TABLET | Refills: 11 | Status: SHIPPED | OUTPATIENT
Start: 2025-03-26

## 2025-03-20 RX ORDER — PROCHLORPERAZINE MALEATE 5 MG
10 TABLET ORAL EVERY 6 HOURS PRN
Qty: 20 TABLET | Refills: 5 | Status: SHIPPED | OUTPATIENT
Start: 2025-03-26

## 2025-03-20 RX ORDER — LENALIDOMIDE 25 MG/1
25 CAPSULE ORAL DAILY
Qty: 21 CAPSULE | Refills: 0 | Status: SHIPPED | OUTPATIENT
Start: 2025-03-26 | End: 2025-03-20 | Stop reason: SDUPTHER

## 2025-03-20 NOTE — PROGRESS NOTES
Hematology Oncology  Initial Consult Note    Patient: Amna Chawla  MRN: [id number]  Date: 3/20/2025    Chief Complaint: Multiple Myeloma      Oncologic History:     Oncologic History:   2/2/2025: Elevated M protein noted 3.43 g/dL with ANALI showing IgG lambda, normal FLC, mild anemia  Pathology:  Bone marrow biopsy  done on 3/10/25. 60% clonal plasma cells. FISH pending.  PET: scheduled 4/2/2025      Subjective:   Initital Consult  Ms. Chawla is a 59 y.o. female who is being seen for an initial consult for elevated M protein. Her co-morbidities include CHB c/b cardiac arrest s/p CRT-D, AFib on Xarelto, HFrEF with improved EF (LVEF 50-55%), prior CVA, HTN, depression, SHIRA on CPAP. She was hospitalized from 1/29 - 2/02 with left upper quadrant pain and was found to have small bowel enteritis. She improved with antibiotics and symptomatic care. During hospitalization, myeloma labs were ordered to assess elevated protein gap. She was found to have IgG lambda with M protein of 3.43 g/dL and subsequently referred to Hematology clinic. Since hospitalization, she has been feeling overall well. She denies fevers, night sweats, weight loss, bone pain.     Interval History: Return visit after marrow biopsy confirmed diagnosis of MM. FISH of plasma cells for risk stratification. Reports some central, lumbar back pain since marrow biopsy. No acute interval events. PET scheduled 4/2/2025.     Past Medical History:   Past Medical History:   Diagnosis Date    Anticoagulant long-term use     Anxiety     Asthma     Atrial fibrillation     Brain anoxic injury     Cervicalgia 8/28/2014    CHI (closed head injury) 2/19/2013    Convulsion 5/30/2015    Decreased ROM of left shoulder 4/12/2017    Defibrillator activation 2013    Depression     Heart block     History of sudden cardiac arrest 2/2013    PEA arrest with subsequent long-QT    Hx of psychiatric care     Hypertension     Left atrial enlargement 4/11/2018    Pacemaker 2013     Paresthesia 11/1/2013    Prolonged Q-T interval on ECG 2/8/2013    Psychiatric problem     Seizures     Sleep difficulties     Stroke     weakness lt side    Therapy     Thyroid disease     Upper airway resistance syndrome 2/21/2017       Past Surgical HIstory:   Past Surgical History:   Procedure Laterality Date    breast reduction      CARDIAC DEFIBRILLATOR PLACEMENT      CARDIAC DEFIBRILLATOR PLACEMENT      CARPAL TUNNEL RELEASE Right     COLONOSCOPY N/A 5/7/2019    Procedure: COLONOSCOPY;  Surgeon: Juan Coffey MD;  Location: [facility] (2ND FLR);  Service: Endoscopy;  Laterality: N/A;  per DR. Bustamante Pt to have balloon with DR. Coffey due to excesive looping, could not reach cecum - see telephone encounter 3/8/19 and last procedure report dated 6/24/14/ Per Piedad schedule as 90 min case- ERW  pacemaker/AICD Boitronik/   per , ok to hold Xareltox 2 days    COLONOSCOPY N/A 6/2/2022    Procedure: COLONOSCOPY;  Surgeon: Juan Coffey MD;  Location: [facility] (2ND FLR);  Service: Endoscopy;  Laterality: N/A;  combined orders    ESOPHAGOGASTRODUODENOSCOPY N/A 6/2/2022    Procedure: EGD (ESOPHAGOGASTRODUODENOSCOPY);  Surgeon: Juan Coffey MD;  Location: [facility] (2ND FLR);  Service: Endoscopy;  Laterality: N/A;  BMI 54; pt requests 1st available [facility]  Fully vaccinated, Hold Xarelto x 2 days per Dr. Mejia and Plavix x 5 days per Dr. Grossman see telephone encounter 4/25/22, Biotronik PM/AICD, prep instr portal and mailed -ml    HERNIA REPAIR      HIATAL HERNIA REPAIR      INSERT / REPLACE / REMOVE PACEMAKER  10/2017    CRT-D upgrade    REVISION OF IMPLANTABLE CARDIOVERTER-DEFIBRILLATOR (ICD) ELECTRODE LEAD PLACEMENT Left 12/7/2020    Procedure: REVISION, INSERTION, ELECTRODE LEAD, ICD;  Surgeon: Avelino Mejia MD;  Location: [facility] EP LAB;  Service: Cardiology;  Laterality: Left;    REVISION OF SKIN POCKET FOR CARDIOVERTER-DEFIBRILLATOR  12/7/2020    Procedure: Revision, Skin Pocket, For  Cardioverter-Defibrillator;  Surgeon: Avelino Mejia MD;  Location: [facility] EP LAB;  Service: Cardiology;;    TOTAL REDUCTION MAMMOPLASTY      TRANSESOPHAGEAL ECHOCARDIOGRAPHY N/A 12/7/2020    Procedure: ECHOCARDIOGRAM, TRANSESOPHAGEAL;  Surgeon: Ivory Goodman MD;  Location: [facility] EP LAB;  Service: Cardiology;  Laterality: N/A;    TRANSESOPHAGEAL ECHOCARDIOGRAPHY N/A 12/7/2020    Procedure: ECHOCARDIOGRAM, TRANSESOPHAGEAL;  Surgeon: Dosc Diagnostic Provider;  Location: [facility] OR 2ND FLR;  Service: Cardiology;  Laterality: N/A;    TRIGGER FINGER RELEASE Left 8/2/2019    Procedure: RELEASE, TRIGGER FINGER left middle;  Surgeon: Matt Pugh Jr., MD;  Location: [facility] OR;  Service: Plastics;  Laterality: Left;    TRIGGER FINGER RELEASE Right 2/11/2020    Procedure: RELEASE, TRIGGER FINGER middle;  Surgeon: Matt Pugh Jr., MD;  Location: [facility] OR;  Service: Plastics;  Laterality: Right;    TUBAL LIGATION         Family History:   Family History   Problem Relation Name Age of Onset    COPD Mother      Hypertension Mother      Hypertension Father      Asthma Daughter Donna     Asthma Daughter Lauryn     Glaucoma Maternal Grandmother      Glaucoma Paternal Grandmother      COPD Other      Heart failure Other         Social History:  reports that she has never smoked. She has never been exposed to tobacco smoke. She has never used smokeless tobacco. She reports that she does not currently use alcohol. She reports that she does not use drugs.    Medications:  Current Medications[1]    Review of Systems   Constitutional:  Negative for appetite change, chills, fatigue, fever and unexpected weight change.   HENT:  Negative for congestion and dental problem.    Eyes:  Negative for photophobia and visual disturbance.   Respiratory:  Negative for cough and shortness of breath.    Cardiovascular:  Negative for chest pain and leg swelling.   Gastrointestinal:  Negative for abdominal pain, constipation, diarrhea and  "nausea.   Genitourinary:  Negative for difficulty urinating and dysuria.   Musculoskeletal:  Negative for arthralgias and back pain.   Skin:  Negative for color change and rash.   Neurological:  Negative for light-headedness and headaches.   Hematological:  Negative for adenopathy. Does not bruise/bleed easily.   Psychiatric/Behavioral:  Negative for agitation and behavioral problems.        ECOG Score:  1  ECOG SCORE               Objective:     Vitals:    03/20/25 0900   BP: 119/65   BP Location: Left arm   Patient Position: Sitting   Pulse: 67   Resp: 18   Temp: 98 °F (36.7 °C)   TempSrc: Oral   SpO2: 99%   Weight: 129.2 kg (284 lb 15.1 oz)   Height: 5' 4" (1.626 m)       BMI: Body mass index is 48.91 kg/m².     Limited due to virtual visit  Physical Exam  Constitutional:       General: She is not in acute distress.  HENT:      Head: Normocephalic and atraumatic.   Pulmonary:      Effort: Pulmonary effort is normal. No respiratory distress.   Neurological:      Mental Status: She is alert and oriented to person, place, and time. Mental status is at baseline.   Psychiatric:         Mood and Affect: Mood normal.         Behavior: Behavior normal.         Laboratory Data:  Lab Visit on 03/18/2025   Component Date Value    WBC 03/18/2025 3.00 (L)     RBC 03/18/2025 3.33 (L)     Hemoglobin 03/18/2025 10.1 (L)     Hematocrit 03/18/2025 30.8 (L)     MCV 03/18/2025 93     MCH 03/18/2025 30.3     MCHC 03/18/2025 32.8     RDW 03/18/2025 15.6 (H)     Platelets 03/18/2025 211     MPV 03/18/2025 12.9     Immature Granulocytes 03/18/2025 0.0     Gran # (ANC) 03/18/2025 1.3 (L)     Immature Grans (Abs) 03/18/2025 0.00     Lymph # 03/18/2025 1.3     Mono # 03/18/2025 0.4     Eos # 03/18/2025 0.0     Baso # 03/18/2025 0.01     nRBC 03/18/2025 0     Gran % 03/18/2025 44.1     Lymph % 03/18/2025 42.3     Mono % 03/18/2025 13.3     Eosinophil % 03/18/2025 0.0     Basophil % 03/18/2025 0.3     Differential Method 03/18/2025 Automated "     Sodium 03/18/2025 135 (L)     Potassium 03/18/2025 3.9     Chloride 03/18/2025 108     CO2 03/18/2025 23     Glucose 03/18/2025 102     BUN 03/18/2025 13     Creatinine 03/18/2025 0.8     Calcium 03/18/2025 8.7     Total Protein 03/18/2025 10.0 (H)     Albumin 03/18/2025 2.9 (L)     Total Bilirubin 03/18/2025 0.3     Alkaline Phosphatase 03/18/2025 57     AST 03/18/2025 14     ALT 03/18/2025 16     eGFR 03/18/2025 >60.0     Anion Gap 03/18/2025 4 (L)     Protein, Serum 03/18/2025 9.8 (H)     Albumin 03/18/2025 3.79     Alpha-1 03/18/2025 0.31     Alpha-2 03/18/2025 0.72     Beta 03/18/2025 0.77     Gamma 03/18/2025 4.20 (H)     IgG 03/18/2025 5267 (H)     IgA 03/18/2025 28 (L)     IgM 03/18/2025 35 (L)     Immunofix Interp. 03/18/2025 SEE COMMENT     Group & Rh 03/18/2025 A POS     Indirect Libby 03/18/2025 NEG     Specimen Outdate 03/18/2025 03/21/2025 23:59     [place] Free Light Chains 03/18/2025 1.02     Lambda Free Light Chains 03/18/2025 6.43 (H)     Kappa/Lambda FLC Ratio 03/18/2025 0.16 (L)     Hep B Core Total Ab 03/18/2025 Non-reactive     Hepatitis B Surface Ag 03/18/2025 Non-reactive     Pathologist Interpretati* 03/18/2025 REVIEWED     Pathologist Interpretati* 03/18/2025 REVIEWED        Imaging: Reviewed    Pathology:  Bone marrow biopsy to be done    Assessment:     1. Multiple myeloma not having achieved remission          Plan:     Plasma cell dyscrasia  Anemia  Patient is a 59 year old woman who was found to have IgG lambda with M protein 3.43 g/dL on labs for elevated protein gap. She has associated anemia. Marrow biopsy with 60% clonal plasma cells and FISh pending. PET scheduled for 4/2/2025.    Discussed today plan for Manisha-VRd induction therapy. Discussed upfront plan for 16 weeks (4 monthly cycles) of treatment. Discussed this is 2 weekly injections (daratumumab and velcade), weekly pill (decadron), pill taken 3 of 4 weeks each month (revlimid).   Discussed side effects of immune reaction  (daratumumab), peripheral neuropathy (velcade), risk of shingles (will take acyclovir prophylaxis), risk of diarrhea (Revlimd), and risk of thrombosis (already takes Xarelto).  Discussed need for general dental evaluation and given dental clearance form for potential bisphosphonate therapy.   Discussed weekly treatment plan, monthly labs and MD/FERNANDO visit, and plan for marrow and PET staging at end of 16 weeks. Discussed consideration of autologous stem cell transplant. Will need to consider patient significant cardiac history in treatment planning.   Discussed next visit would include pharmacy consultation, consent signing and first treatment.      PLAN:  -Manisha-VRd induction  - Return visit 7-10 days for pharmacy visit, consent signing, and first therapy    Route Chart for Scheduling    BMT Chart Routing      Follow up with physician . Needs to return when manisha-VRd approved for pharm visit and consent signing with MD or FERNANDO   Follow up with FERNANDO    Provider visit type Consent   Infusion scheduling note    Injection scheduling note DaraVRD weekly for 16 weeks   Labs CBC, CMP, free light chains and SPEP   Scheduling:  Preferred lab:  Lab interval:  labs monthly [facility] provider visit   Imaging    Pharmacy appointment Pharmacy appointment needed      Other referrals              A total of 20 minutes was spent in pre-visit chart review, personal interpretation of labs and imaging, and medication review. Total visit time 40 minutes, >50 % counseling.     Visit today included increased complexity associated with the care of the episodic problem remission and treatment side effect monitoring addressed and managing the longitudinal care of the patient due to the serious and/or complex managed problem(s) multiple myeloma.              [1]   Current Outpatient Medications   Medication Sig Dispense Refill    carvediloL (COREG) 12.5 MG tablet Take 1 tablet (12.5 mg total) by mouth 2 (two) times daily. 180 tablet 3    clonazePAM  (KLONOPIN) 1 MG tablet TAKE 1 TABLET BY MOUTH DAILY AS NEEDED FOR ANXIETY 30 tablet 5    dicyclomine (BENTYL) 10 MG capsule Take 1 capsule (10 mg total) by mouth 2 (two) times daily as needed (For abdominal cramping). 30 capsule 0    ergocalciferol (ERGOCALCIFEROL) 50,000 unit Cap Take 1 capsule (50,000 Units total) by mouth every 7 days. 12 capsule 3    furosemide (LASIX) 20 MG tablet Take 1 tablet (20 mg total) by mouth daily as needed. Take as needed for weight gain (2-3 lbs/day or 5 lbs/week), shortness of breath, or leg swelling 45 tablet 3    losartan (COZAAR) 100 MG tablet Take 1 tablet (100 mg total) by mouth once daily. 90 tablet 3    omeprazole (PRILOSEC) 40 MG capsule Take 1 capsule (40 mg total) by mouth once daily. Take 30 minutes before breakfast 90 capsule 3    ondansetron (ZOFRAN-ODT) 4 MG TbDL Dissolve 2 tablets (8 mg total) by mouth every 6 (six) hours as needed. 20 tablet 0    polyethylene glycol (GLYCOLAX) 17 gram PwPk Take 17 g by mouth once daily. 20 each 12    rivaroxaban (XARELTO) 20 mg Tab Take 1 tablet (20 mg total) by mouth daily with dinner or evening meal. 30 tablet 11    rosuvastatin (CRESTOR) 40 MG Tab Take 1 tablet (40 mg total) by mouth every evening. 90 tablet 3    tiaGABine (GABITRIL) 4 MG tablet Take 1 tablet (4 mg total) by mouth every evening. 30 tablet 12    tiZANidine (ZANAFLEX) 4 MG tablet Take 1 tablet (4 mg total) by mouth every 6 (six) hours as needed (migraine). 60 tablet 12    zolpidem (AMBIEN) 10 mg Tab Take 1 tablet (10 mg total) by mouth nightly as needed (insomnia). 30 tablet 5    [START ON 3/26/2025] acyclovir (ZOVIRAX) 400 MG tablet Take 1 tablet (400 mg total) by mouth 2 (two) times daily. 60 tablet 11    [START ON 3/26/2025] dexAMETHasone (DECADRON) 4 MG Tab Take 10 tablets (40 mg total) by mouth every 7 days. Take with food. 40 tablet 5    EPINEPHrine (EPIPEN) 0.3 mg/0.3 mL AtIn Inject 0.3 mLs (0.3 mg total) into the muscle once. for 1 dose 0.3 mL 1    [START ON  3/26/2025] lenalidomide 25 mg Cap Take 1 capsule (25 mg total) by mouth once daily on days 1-21 of each 28 day cycle. [employer] authorization # [id number]. 21 capsule 0    [START ON 3/26/2025] prochlorperazine (COMPAZINE) 5 MG tablet Take 2 tablets (10 mg total) by mouth every 6 (six) hours as needed for Nausea. 20 tablet 5     Current Facility-Administered Medications   Medication Dose Route Frequency Provider Last Rate Last Admin    cyanocobalamin tablet 100 mcg  100 mcg Oral 1 time in Clinic/HOD         onabotulinumtoxina injection 200 Units  200 Units Intramuscular Q90 Days David Cortez MD   200 Units at 10/19/18 1713    onabotulinumtoxina injection 200 Units  200 Units Intramuscular Q90 Days David Cortez MD   200 Units at 12/28/18 1106    onabotulinumtoxina injection 200 Units  200 Units Intramuscular Q90 Days David Cortez MD   200 Units at 03/13/19 1504    onabotulinumtoxina injection 200 Units  200 Units Intramuscular Q90 Days David Cortez MD   200 Units at 05/23/19 1123    onabotulinumtoxina injection 200 Units  200 Units Intramuscular Q90 Days David Cortez MD   200 Units at 10/11/19 1112    onabotulinumtoxina injection 200 Units  200 Units Intramuscular Q90 Days David Cortez MD   200 Units at 12/19/19 1036    onabotulinumtoxina injection 200 Units  200 Units Intramuscular Q10 weeks David Cortez MD   200 Units at 12/27/24 1030    onabotulinumtoxina injection 200 Units  200 Units Intramuscular Q90 Days David Cortez MD   200 Units at 03/13/25 1441     Facility-Administered Medications Ordered in Other Visits   Medication Dose Route Frequency Provider Last Rate Last Admin    lactated ringers infusion   Intravenous Continuous Humberto Angel MD   Stopped at 02/11/20 0801    lidocaine (PF) 10 mg/ml (1%) injection 5 mg  0.5 mL Intradermal Once Humbetro Angel MD

## 2025-03-20 NOTE — TELEPHONE ENCOUNTER
----- Message from Zelda sent at 3/20/2025 10:17 AM CDT -----  Regarding: change appt time  Contact: Pt @ [phone]  Pt  is asking to speak to someone in the office to change appt time only. Pt is asking to please keep appt on the same date if possible. Pt is asking for a return call first before making changes. Please call. Thanks.  New appt time caller is requesting to change to: in the morning for lab and appt on 3/27

## 2025-03-23 LAB
HBV SURFACE AB SER QL IA: NEGATIVE
HBV SURFACE AB SERPL IA-ACNC: <3 MIU/ML

## 2025-03-24 ENCOUNTER — TELEPHONE (OUTPATIENT)
Dept: HEMATOLOGY/ONCOLOGY | Facility: CLINIC | Age: 60
End: 2025-03-24
Payer: MEDICARE

## 2025-03-24 NOTE — TELEPHONE ENCOUNTER
Faxed last office visit note, cmp and medication list to [facility] specialty pharmacy.

## 2025-03-24 NOTE — TELEPHONE ENCOUNTER
----- Message from Lynn sent at 3/24/2025 11:10 AM CDT -----  Regarding: Updated inf  Contact: :[phone]  Type:  RX Refill RequestWho Called: Jason calling [facility] pharmacyRefill or New Rx:refillRX Name and Strength:lenalidomide 25 mg Cap   Local or Mail Order:[phone] ext [phone] Provider:Frederic Would the patient rather a call back or a response via [facility]? Best Call Back Number:[phone] Information:

## 2025-03-24 NOTE — TELEPHONE ENCOUNTER
----- Message from Lynn sent at 3/24/2025 11:10 AM CDT -----  Regarding: Updated inf  Contact: :[phone]  Type:  RX Refill RequestWho Called: Jason calling from [facility] pharmacyRefill or New Rx:refillRX Name and Strength:lenalidomide 25 mg Cap   Local or Mail Order:[phone] ext [phone] Provider:Frederic Would the patient rather a call back or a response via [facility]? Best Call Back Number:[phone] Information:

## 2025-03-25 ENCOUNTER — TELEPHONE (OUTPATIENT)
Dept: HEMATOLOGY/ONCOLOGY | Facility: CLINIC | Age: 60
End: 2025-03-25
Payer: MEDICARE

## 2025-03-25 NOTE — TELEPHONE ENCOUNTER
----- Message from Ivett sent at 3/25/2025 11:56 AM CDT -----  Regarding: Patient Returning Missed Call  Type:  Patient Returning CallWho Called:Amna Antunez Left Message for Patient:Quita Townsend RNDoes the patient know what this is regarding?:Treatment inquiry Would the patient rather a call back or a response via [facility]? Call [facility] Call Back Number:[phone] Information:

## 2025-03-25 NOTE — TELEPHONE ENCOUNTER
----- Message from Vicenta sent at 3/25/2025  9:17 AM CDT -----  Type:  Needs Medical AdviceWho Called: AlishaSymptoms (please be specific): allergic to food asking if treatment can be done still  How long has patient had these symptoms:  Saturday Pharmacy name and phone #:  Would the patient rather a call back or a response via [facility]? callBest Call Back Number:  [phone] Information:

## 2025-03-25 NOTE — TELEPHONE ENCOUNTER
Spoke with  who stated that her food got contaminated with shellfish. Pt stated that she has a scratchy throat and has not taken any Benadryl since Sunday. Pt wanted to know if she was okay to have her treatment as scheduled. Pt denied facial edema, difficulty breathing, or difficulty swallowing. Pt was advised that she should begin taking Zyrtec. Pt verbalized agreement and understanding.

## 2025-03-26 DIAGNOSIS — C90.00 MULTIPLE MYELOMA NOT HAVING ACHIEVED REMISSION: Primary | ICD-10-CM

## 2025-03-26 NOTE — PROGRESS NOTES
Section of Hematology and Stem Cell Transplantation  Follow-Up Note     03/27/2025  Primary Oncologic Diagnosis: Multiple myeloma not having achieved remission [C90.00]    History of Present Ilness:   Amna Chawla (Alisa) is a pleasant 59 y.o.female from [place], [place], here for follow up of newly diagnosed myeloma. Her co-morbidities include CHB c/b cardiac arrest s/p CRT-D, AFib on Xarelto, HFrEF with improved EF (LVEF 50-55%), prior CVA, HTN, depression, SHIRA on CPAP.     Oncologic History:   She was hospitalized from 1/29 - 2/02 with left upper quadrant pain and was found to have small bowel enteritis. She improved with antibiotics and symptomatic care. During hospitalization, myeloma labs were ordered to assess elevated protein gap. She was found to have IgG lambda with M protein of 3.43 g/dL and subsequently referred to Hematology clinic. Since hospitalization, she has been feeling overall well. She denies fevers, night sweats, weight loss, bone pain.   2/2/2025: Elevated M protein noted 3.43 g/dL with ANALI showing IgG lambda, normal FLC, mild anemia  Pathology:  Bone marrow biopsy  done on 3/10/25. 60% clonal plasma cells. FISH pending.  PET: scheduled 4/2/2025    Interval History 03/27/2025:  Patient here for follow up with pharmacy teaching and consent signing prior to initiation of lion-VRd for newly diagnosed multiple myeloma. Her daughter is with her for support. She is well today and without any major symptoms. Denies fever, chills, drenching night sweats, unexplained weight loss, early satiety, chest pain, shortness of breath, new/worsening bone pain, adenopathy, N/V/D. She reports she had a tubal ligation and is post-menopausal.     Past Medical History, Social History, and Past Family History are unchanged since last evaluation except for HPI.    Past Medical History:   Diagnosis Date    Anticoagulant long-term use     Anxiety     Asthma     Atrial fibrillation     Brain anoxic injury      Cervicalgia 8/28/2014    CHI (closed head injury) 2/19/2013    Convulsion 5/30/2015    Decreased ROM of left shoulder 4/12/2017    Defibrillator activation 2013    Depression     Heart block     History of sudden cardiac arrest 2/2013    PEA arrest with subsequent long-QT    Hx of psychiatric care     Hypertension     Left atrial enlargement 4/11/2018    Pacemaker 2013    Paresthesia 11/1/2013    Prolonged Q-T interval on ECG 2/8/2013    Psychiatric problem     Seizures     Sleep difficulties     Stroke     weakness lt side    Therapy     Thyroid disease     Upper airway resistance syndrome 2/21/2017        CURRENT MEDICATIONS:   Current Outpatient Medications   Medication Sig    acyclovir (ZOVIRAX) 400 MG tablet Take 1 tablet (400 mg total) by mouth 2 (two) times daily.    carvediloL (COREG) 12.5 MG tablet Take 1 tablet (12.5 mg total) by mouth 2 (two) times daily.    clonazePAM (KLONOPIN) 1 MG tablet TAKE 1 TABLET BY MOUTH DAILY AS NEEDED FOR ANXIETY    dexAMETHasone (DECADRON) 4 MG Tab Take 10 tablets (40 mg total) by mouth every 7 days. Take with food.    dicyclomine (BENTYL) 10 MG capsule Take 1 capsule (10 mg total) by mouth 2 (two) times daily as needed (For abdominal cramping).    ergocalciferol (ERGOCALCIFEROL) 50,000 unit Cap Take 1 capsule (50,000 Units total) by mouth every 7 days.    furosemide (LASIX) 20 MG tablet Take 1 tablet (20 mg total) by mouth daily as needed. Take as needed for weight gain (2-3 lbs/day or 5 lbs/week), shortness of breath, or leg swelling    lenalidomide 25 mg Cap Take 1 capsule (25 mg total) by mouth once daily on days 1-21 of each 28 day cycle. "[employer]," authorization # [id number] 3/20/25.    losartan (COZAAR) 100 MG tablet Take 1 tablet (100 mg total) by mouth once daily.    omeprazole (PRILOSEC) 40 MG capsule Take 1 capsule (40 mg total) by mouth once daily. Take 30 minutes before breakfast    ondansetron (ZOFRAN-ODT) 4 MG TbDL Dissolve 2 tablets (8 mg total) by mouth every 6  (six) hours as needed.    polyethylene glycol (GLYCOLAX) 17 gram PwPk Take 17 g by mouth once daily.    prochlorperazine (COMPAZINE) 5 MG tablet Take 2 tablets (10 mg total) by mouth every 6 (six) hours as needed for Nausea.    rivaroxaban (XARELTO) 20 mg Tab Take 1 tablet (20 mg total) by mouth daily with dinner or evening meal.    rosuvastatin (CRESTOR) 40 MG Tab Take 1 tablet (40 mg total) by mouth every evening.    tiaGABine (GABITRIL) 4 MG tablet Take 1 tablet (4 mg total) by mouth every evening.    tiZANidine (ZANAFLEX) 4 MG tablet Take 1 tablet (4 mg total) by mouth every 6 (six) hours as needed (migraine).    zolpidem (AMBIEN) 10 mg Tab Take 1 tablet (10 mg total) by mouth nightly as needed (insomnia).    EPINEPHrine (EPIPEN) 0.3 mg/0.3 mL AtIn Inject 0.3 mLs (0.3 mg total) into the muscle once. for 1 dose     Current Facility-Administered Medications   Medication    cyanocobalamin tablet 100 mcg    onabotulinumtoxina injection 200 Units    onabotulinumtoxina injection 200 Units    onabotulinumtoxina injection 200 Units    onabotulinumtoxina injection 200 Units    onabotulinumtoxina injection 200 Units    onabotulinumtoxina injection 200 Units    onabotulinumtoxina injection 200 Units    onabotulinumtoxina injection 200 Units     Facility-Administered Medications Ordered in Other Visits   Medication    lactated ringers infusion    lidocaine (PF) 10 mg/ml (1%) injection 5 mg       ALLERGIES:   Review of patient's allergies indicates:   Allergen Reactions    Aspirin Hives    Imitrex [sumatriptan] Palpitations    Penicillins Hives and Swelling    Shellfish containing products Anaphylaxis     seafood    Reglan [metoclopramide hcl] Other (See Comments)     Parkinsonism        Review of Systems:     Review of Systems   Constitutional:  Negative for chills, fever, malaise/fatigue and weight loss.   HENT:  Negative for congestion, nosebleeds, sinus pain and sore throat.    Eyes:  Negative for blurred vision, double  vision, photophobia and pain.   Respiratory:  Negative for cough and shortness of breath.    Cardiovascular:  Negative for chest pain and palpitations.   Gastrointestinal:  Negative for constipation, diarrhea, heartburn, nausea and vomiting.   Genitourinary:  Negative for dysuria and hematuria.   Musculoskeletal:  Negative for back pain and falls.   Skin:  Negative for rash.   Neurological:  Negative for dizziness, sensory change, weakness and headaches.   Endo/Heme/Allergies:  Negative for environmental allergies.   Psychiatric/Behavioral:  The patient does not have insomnia.        Physical Exam:     Vitals:    03/27/25 0920   BP: (!) (P) 109/57   Pulse: 65   Resp: 18   Temp: 98 °F (36.7 °C)       Physical Exam  Vitals reviewed.   Constitutional:       General: She is not in acute distress.     Appearance: Normal appearance. She is obese. She is not ill-appearing.   HENT:      Head: Normocephalic and atraumatic.      Nose: Nose normal.      Mouth/Throat:      Mouth: Mucous membranes are moist.      Pharynx: Oropharynx is clear. No oropharyngeal exudate.   Eyes:      Pupils: Pupils are equal, round, and reactive to light.   Cardiovascular:      Rate and Rhythm: Normal rate and regular rhythm.      Heart sounds: No murmur heard.  Pulmonary:      Effort: Pulmonary effort is normal.      Breath sounds: Normal breath sounds. No wheezing.   Abdominal:      General: Bowel sounds are normal.      Palpations: Abdomen is soft.      Tenderness: There is no abdominal tenderness.   Musculoskeletal:         General: Normal range of motion.      Cervical back: Normal range of motion and neck supple.      Right lower leg: Edema present.      Left lower leg: Edema present.   Skin:     General: Skin is warm and dry.      Capillary Refill: Capillary refill takes less than 2 seconds.      Findings: No bruising, lesion or rash.   Neurological:      Mental Status: She is alert and oriented to person, place, and time.      Motor: No  weakness.   Psychiatric:         Mood and Affect: Mood normal.         Behavior: Behavior normal.         Thought Content: Thought content normal.        ECOG Performance Status: (foot note - ECOG PS provided by Eastern Cooperative Oncology Group) 1 - Symptomatic but completely ambulatory    Karnofsky Performance Score:  100%- Normal, No Complaints, No Evidence of Disease      Labs:   Lab Results   Component Value Date    WBC 3.00 (L) 03/18/2025    HGB 10.1 (L) 03/18/2025    HCT 30.8 (L) 03/18/2025    MCV 93 03/18/2025     03/18/2025       Lab Results   Component Value Date     (L) 03/18/2025    K 3.9 03/18/2025     03/18/2025    CO2 23 03/18/2025    BUN 13 03/18/2025    CREATININE 0.8 03/18/2025    ALBUMIN 2.9 (L) 03/18/2025    BILITOT 0.3 03/18/2025    ALKPHOS 57 03/18/2025    AST 14 03/18/2025    ALT 16 03/18/2025       Imaging:    PET pending on 4/2/25    Pathology:  3/10/25 Bone Marrow Biopsy  RIGHT ILIAC CREST BONE MARROW ASPIRATE, BONE MARROW CLOT, AND BONE MARROW CORE BIOPSY WITH:     CELLULARITY= 70-80%, TRILINEAGE HEMATOPOIETIC ACTIVITY (M/E= 2.9:1).   PLASMA CELL NEOPLASM (65%).  SEE COMMENT.   GRADE I RETICULAR FIBROSIS.   STORAGE IRON NOT IDENTIFIED.   CONGO RED NEGATIVE.   ADEQUATE NUMBER OF MEGAKARYOCYTES.      Assessment and Plan:     Multiple myeloma not having achieved remission  - IgG lambda multiple myeloma, standard-risk by FISH, R-ISS Stage II  - Pre-treatment labs: m-protein 3.43 g/dL, lambda sFLC 6.43  - Normal calcium and renal function  - Pharmacy teaching and consented today  - 3/27/25: initiated lion-VRd, 25 mg lenalidomide days 1-21 and dexamethasone 40 mg weekly  - Plan for one time check of CBC/CMP in 3 weeks to check for lab toxicity, if stable, then labs monthly CBC, CMP, SPEP, serum ANALI, serum immunoglobulins, serum FLC.  - Plan for repeat BMBx and PET scan after 4 cycles  - Contact me with any uncontrolled symptoms.    Immunodeficiency due to drugs  - Secondary  to treatment, start acyclovir two times daily for shingles prophylaxis    Neutropenia, unspecified type  - Secondary to multiple myeloma  - Mild, will monitor for improvement after treatment start    Other thrombophilia  - Secondary to MM and revlimid, continue xarelto daily for VTE prophylaxis    Anemia in neoplastic disease  - Secondary to multiple myeloma, stable, asymptomatic    Vitamin D deficiency  - Noted to be deficient on recent labs  - Continue ergocalciferol 50,000 units weekly    Obesity  - Long-standing, multiple chronic co-morbidities  - Ambulatory, no difficulty performing ADLs        BMT Chart Routing      Follow up with physician    Follow up with FERNANDO Ortega: MM, keep 4/23 add 5/21   Provider visit type    Infusion scheduling note    Injection scheduling note Keep all current injection appts, Please move 5/15 injection to late afternoon   Labs CBC, CMP, free light chains, immunofixation, immunoglobulins and SPEP   Scheduling:  Preferred lab:  Lab interval:  4/10: add CBC/CMP/type & screen only prior to treatment; move 4/21 labs to 0900 and add type & screen; move 5/26 labs to 5/21 morning at 0900   Imaging    Pharmacy appointment    Other referrals               Orders Placed:      Orders Placed This Encounter    CBC Auto Differential    Comprehensive Metabolic Panel    Immunofixation, Serum    Immunoglobulin Free LT Chains Blood    Immunoglobulins (IgG, IgA, IgM) Quantitative    Protein Electrophoresis, Serum       Total time of this visit was 55 minutes, including time spent face to face with patient and/or via video/audio, and also in preparing for today's visit for MDM and documentation. (Medical Decision Making, including consideration of possible diagnoses, management options, complex medical record review, review of diagnostic tests and information, consideration and discussion of significant complications based on comorbidities, and discussion with providers involved with the care of the  patient). Greater than 50% was spent face to face with the patient counseling and coordinating care.    Visit today included increased complexity associated with the care of the episodic problem consenting addressed and managing the longitudinal care of the patient due to the serious and/or complex managed problem(s) multiple myeloma.     Thank you for allowing me to partake in the care of this patient. If there are any questions, please do not hesitate to reach out.    Shannon Culver, FNP-C  Hematologic Malignancies, Stem Cell Transplantation, and Cellular Therapy  [facility]

## 2025-03-27 ENCOUNTER — INFUSION (OUTPATIENT)
Dept: INFUSION THERAPY | Facility: HOSPITAL | Age: 60
End: 2025-03-27
Payer: MEDICARE

## 2025-03-27 ENCOUNTER — LAB VISIT (OUTPATIENT)
Dept: LAB | Facility: HOSPITAL | Age: 60
End: 2025-03-27
Attending: INTERNAL MEDICINE
Payer: MEDICARE

## 2025-03-27 ENCOUNTER — CLINICAL SUPPORT (OUTPATIENT)
Dept: HEMATOLOGY/ONCOLOGY | Facility: CLINIC | Age: 60
End: 2025-03-27
Payer: MEDICARE

## 2025-03-27 ENCOUNTER — OFFICE VISIT (OUTPATIENT)
Dept: HEMATOLOGY/ONCOLOGY | Facility: CLINIC | Age: 60
End: 2025-03-27
Payer: MEDICARE

## 2025-03-27 VITALS
TEMPERATURE: 98 F | SYSTOLIC BLOOD PRESSURE: 115 MMHG | HEART RATE: 75 BPM | HEIGHT: 64 IN | DIASTOLIC BLOOD PRESSURE: 56 MMHG | BODY MASS INDEX: 48.77 KG/M2 | OXYGEN SATURATION: 100 % | WEIGHT: 285.69 LBS | RESPIRATION RATE: 18 BRPM

## 2025-03-27 VITALS
BODY MASS INDEX: 48.77 KG/M2 | HEIGHT: 64 IN | WEIGHT: 285.69 LBS | RESPIRATION RATE: 18 BRPM | TEMPERATURE: 98 F | HEART RATE: 65 BPM | OXYGEN SATURATION: 100 %

## 2025-03-27 DIAGNOSIS — D68.69 OTHER THROMBOPHILIA: ICD-10-CM

## 2025-03-27 DIAGNOSIS — Z79.899 IMMUNODEFICIENCY DUE TO DRUGS: ICD-10-CM

## 2025-03-27 DIAGNOSIS — E55.9 VITAMIN D DEFICIENCY: ICD-10-CM

## 2025-03-27 DIAGNOSIS — C90.00 MULTIPLE MYELOMA NOT HAVING ACHIEVED REMISSION: Primary | ICD-10-CM

## 2025-03-27 DIAGNOSIS — C90.00 MULTIPLE MYELOMA NOT HAVING ACHIEVED REMISSION: ICD-10-CM

## 2025-03-27 DIAGNOSIS — E66.01 CLASS 3 SEVERE OBESITY DUE TO EXCESS CALORIES WITH SERIOUS COMORBIDITY AND BODY MASS INDEX (BMI) OF 45.0 TO 49.9 IN ADULT: ICD-10-CM

## 2025-03-27 DIAGNOSIS — E66.813 CLASS 3 SEVERE OBESITY DUE TO EXCESS CALORIES WITH SERIOUS COMORBIDITY AND BODY MASS INDEX (BMI) OF 45.0 TO 49.9 IN ADULT: ICD-10-CM

## 2025-03-27 DIAGNOSIS — D84.821 IMMUNODEFICIENCY DUE TO DRUGS: ICD-10-CM

## 2025-03-27 DIAGNOSIS — D63.0 ANEMIA IN NEOPLASTIC DISEASE: ICD-10-CM

## 2025-03-27 DIAGNOSIS — D70.9 NEUTROPENIA, UNSPECIFIED TYPE: ICD-10-CM

## 2025-03-27 LAB
ABSOLUTE EOSINOPHIL (OHS): 0.01 K/UL
ABSOLUTE MONOCYTE (OHS): 0.47 K/UL (ref 0.3–1)
ABSOLUTE NEUTROPHIL COUNT (OHS): 1.88 K/UL (ref 1.8–7.7)
ALBUMIN SERPL BCP-MCNC: 2.7 G/DL (ref 3.5–5.2)
ALP SERPL-CCNC: 61 UNIT/L (ref 40–150)
ALT SERPL W/O P-5'-P-CCNC: 15 UNIT/L (ref 10–44)
ANION GAP (OHS): 8 MMOL/L (ref 8–16)
AST SERPL-CCNC: 17 UNIT/L (ref 11–45)
BASOPHILS # BLD AUTO: 0 K/UL
BASOPHILS NFR BLD AUTO: 0 %
BETA 2 MICROGLOBILIN (OHS): 5.1 UG/ML (ref 0–2.5)
BILIRUB SERPL-MCNC: 0.2 MG/DL (ref 0.1–1)
BUN SERPL-MCNC: 12 MG/DL (ref 6–20)
CALCIUM SERPL-MCNC: 8.5 MG/DL (ref 8.7–10.5)
CHLORIDE SERPL-SCNC: 107 MMOL/L (ref 95–110)
CO2 SERPL-SCNC: 20 MMOL/L (ref 23–29)
CREAT SERPL-MCNC: 0.8 MG/DL (ref 0.5–1.4)
ERYTHROCYTE [DISTWIDTH] IN BLOOD BY AUTOMATED COUNT: 15.3 % (ref 11.5–14.5)
GFR SERPLBLD CREATININE-BSD FMLA CKD-EPI: >60 ML/MIN/1.73/M2
GLUCOSE SERPL-MCNC: 72 MG/DL (ref 70–110)
HCT VFR BLD AUTO: 29.1 % (ref 37–48.5)
HGB BLD-MCNC: 9.5 GM/DL (ref 12–16)
IMM GRANULOCYTES # BLD AUTO: 0 K/UL (ref 0–0.04)
IMM GRANULOCYTES NFR BLD AUTO: 0 % (ref 0–0.5)
LDH SERPL-CCNC: 216 U/L (ref 110–260)
LYMPHOCYTES # BLD AUTO: 1.28 K/UL (ref 1–4.8)
MCH RBC QN AUTO: 30.2 PG (ref 27–50)
MCHC RBC AUTO-ENTMCNC: 32.6 G/DL (ref 32–36)
MCV RBC AUTO: 92 FL (ref 82–98)
NUCLEATED RBC (/100WBC) (OHS): 0 /100 WBC
PLATELET # BLD AUTO: 163 K/UL (ref 150–450)
PMV BLD AUTO: 12.9 FL (ref 9.2–12.9)
POTASSIUM SERPL-SCNC: 3.6 MMOL/L (ref 3.5–5.1)
PROT SERPL-MCNC: 10 GM/DL (ref 6–8.4)
RBC # BLD AUTO: 3.15 M/UL (ref 4–5.4)
RELATIVE EOSINOPHIL (OHS): 0.3 %
RELATIVE LYMPHOCYTE (OHS): 35.2 % (ref 18–48)
RELATIVE MONOCYTE (OHS): 12.9 % (ref 4–15)
RELATIVE NEUTROPHIL (OHS): 51.6 % (ref 38–73)
SODIUM SERPL-SCNC: 135 MMOL/L (ref 136–145)
WBC # BLD AUTO: 3.64 K/UL (ref 3.9–12.7)

## 2025-03-27 PROCEDURE — 83615 LACTATE (LD) (LDH) ENZYME: CPT | Mod: HCNC

## 2025-03-27 PROCEDURE — 83521 IG LIGHT CHAINS FREE EACH: CPT | Mod: HCNC

## 2025-03-27 PROCEDURE — 25000003 PHARM REV CODE 250: Mod: HCNC | Performed by: INTERNAL MEDICINE

## 2025-03-27 PROCEDURE — 84165 PROTEIN E-PHORESIS SERUM: CPT | Mod: HCNC,,, | Performed by: PATHOLOGY

## 2025-03-27 PROCEDURE — 99999 PR PBB SHADOW E&M-EST. PATIENT-LVL III: CPT | Mod: PBBFAC,HCNC,,

## 2025-03-27 PROCEDURE — 96401 CHEMO ANTI-NEOPL SQ/IM: CPT | Mod: HCNC

## 2025-03-27 PROCEDURE — 85025 COMPLETE CBC W/AUTO DIFF WBC: CPT | Mod: HCNC

## 2025-03-27 PROCEDURE — 80053 COMPREHEN METABOLIC PANEL: CPT | Mod: HCNC

## 2025-03-27 PROCEDURE — 82232 ASSAY OF BETA-2 PROTEIN: CPT | Mod: HCNC

## 2025-03-27 PROCEDURE — 63600175 PHARM REV CODE 636 W HCPCS: Mod: JZ,TB,HCNC | Performed by: INTERNAL MEDICINE

## 2025-03-27 PROCEDURE — 36415 COLL VENOUS BLD VENIPUNCTURE: CPT | Mod: HCNC

## 2025-03-27 PROCEDURE — 84165 PROTEIN E-PHORESIS SERUM: CPT | Mod: HCNC

## 2025-03-27 RX ORDER — EPINEPHRINE 0.3 MG/.3ML
0.3 INJECTION SUBCUTANEOUS ONCE AS NEEDED
OUTPATIENT
Start: 2025-04-04

## 2025-03-27 RX ORDER — BORTEZOMIB 3.5 MG/1
1.3 INJECTION, POWDER, LYOPHILIZED, FOR SOLUTION INTRAVENOUS; SUBCUTANEOUS
Status: COMPLETED | OUTPATIENT
Start: 2025-03-27 | End: 2025-03-27

## 2025-03-27 RX ORDER — SODIUM CHLORIDE 0.9 % (FLUSH) 0.9 %
10 SYRINGE (ML) INJECTION
Status: DISCONTINUED | OUTPATIENT
Start: 2025-03-27 | End: 2025-03-27 | Stop reason: HOSPADM

## 2025-03-27 RX ORDER — HEPARIN 100 UNIT/ML
500 SYRINGE INTRAVENOUS
OUTPATIENT
Start: 2025-04-04

## 2025-03-27 RX ORDER — DIPHENHYDRAMINE HYDROCHLORIDE 50 MG/ML
50 INJECTION, SOLUTION INTRAMUSCULAR; INTRAVENOUS ONCE AS NEEDED
Status: DISCONTINUED | OUTPATIENT
Start: 2025-03-27 | End: 2025-03-27 | Stop reason: HOSPADM

## 2025-03-27 RX ORDER — ACETAMINOPHEN 325 MG/1
650 TABLET ORAL
OUTPATIENT
Start: 2025-04-11

## 2025-03-27 RX ORDER — PROCHLORPERAZINE EDISYLATE 5 MG/ML
10 INJECTION INTRAMUSCULAR; INTRAVENOUS ONCE AS NEEDED
OUTPATIENT
Start: 2025-04-18

## 2025-03-27 RX ORDER — ACETAMINOPHEN 325 MG/1
650 TABLET ORAL
Status: COMPLETED | OUTPATIENT
Start: 2025-03-27 | End: 2025-03-27

## 2025-03-27 RX ORDER — BORTEZOMIB 3.5 MG/1
1.3 INJECTION, POWDER, LYOPHILIZED, FOR SOLUTION INTRAVENOUS; SUBCUTANEOUS
OUTPATIENT
Start: 2025-04-04

## 2025-03-27 RX ORDER — DIPHENHYDRAMINE HYDROCHLORIDE 50 MG/ML
50 INJECTION, SOLUTION INTRAMUSCULAR; INTRAVENOUS ONCE AS NEEDED
OUTPATIENT
Start: 2025-04-11

## 2025-03-27 RX ORDER — SODIUM CHLORIDE 0.9 % (FLUSH) 0.9 %
10 SYRINGE (ML) INJECTION
OUTPATIENT
Start: 2025-04-18

## 2025-03-27 RX ORDER — BORTEZOMIB 3.5 MG/1
1.3 INJECTION, POWDER, LYOPHILIZED, FOR SOLUTION INTRAVENOUS; SUBCUTANEOUS
Status: CANCELLED | OUTPATIENT
Start: 2025-03-28

## 2025-03-27 RX ORDER — DIPHENHYDRAMINE HCL 25 MG
25 CAPSULE ORAL
OUTPATIENT
Start: 2025-04-04

## 2025-03-27 RX ORDER — DIPHENHYDRAMINE HYDROCHLORIDE 50 MG/ML
50 INJECTION, SOLUTION INTRAMUSCULAR; INTRAVENOUS ONCE AS NEEDED
OUTPATIENT
Start: 2025-04-04

## 2025-03-27 RX ORDER — EPINEPHRINE 0.3 MG/.3ML
0.3 INJECTION SUBCUTANEOUS ONCE AS NEEDED
Status: CANCELLED | OUTPATIENT
Start: 2025-03-28

## 2025-03-27 RX ORDER — BORTEZOMIB 3.5 MG/1
1.3 INJECTION, POWDER, LYOPHILIZED, FOR SOLUTION INTRAVENOUS; SUBCUTANEOUS
OUTPATIENT
Start: 2025-04-18

## 2025-03-27 RX ORDER — DIPHENHYDRAMINE HCL 25 MG
25 CAPSULE ORAL
OUTPATIENT
Start: 2025-04-11

## 2025-03-27 RX ORDER — PROCHLORPERAZINE EDISYLATE 5 MG/ML
10 INJECTION INTRAMUSCULAR; INTRAVENOUS ONCE AS NEEDED
Status: DISCONTINUED | OUTPATIENT
Start: 2025-03-27 | End: 2025-03-27 | Stop reason: HOSPADM

## 2025-03-27 RX ORDER — SODIUM CHLORIDE 0.9 % (FLUSH) 0.9 %
10 SYRINGE (ML) INJECTION
Status: CANCELLED | OUTPATIENT
Start: 2025-03-28

## 2025-03-27 RX ORDER — DIPHENHYDRAMINE HYDROCHLORIDE 50 MG/ML
50 INJECTION, SOLUTION INTRAMUSCULAR; INTRAVENOUS ONCE AS NEEDED
OUTPATIENT
Start: 2025-04-18

## 2025-03-27 RX ORDER — HEPARIN 100 UNIT/ML
500 SYRINGE INTRAVENOUS
OUTPATIENT
Start: 2025-04-18

## 2025-03-27 RX ORDER — ACETAMINOPHEN 325 MG/1
650 TABLET ORAL
Status: CANCELLED | OUTPATIENT
Start: 2025-03-28

## 2025-03-27 RX ORDER — PROCHLORPERAZINE EDISYLATE 5 MG/ML
10 INJECTION INTRAMUSCULAR; INTRAVENOUS ONCE AS NEEDED
Status: CANCELLED | OUTPATIENT
Start: 2025-03-28

## 2025-03-27 RX ORDER — DIPHENHYDRAMINE HCL 25 MG
25 CAPSULE ORAL
Status: CANCELLED | OUTPATIENT
Start: 2025-03-28

## 2025-03-27 RX ORDER — EPINEPHRINE 0.3 MG/.3ML
0.3 INJECTION SUBCUTANEOUS ONCE AS NEEDED
OUTPATIENT
Start: 2025-04-18

## 2025-03-27 RX ORDER — PROCHLORPERAZINE EDISYLATE 5 MG/ML
10 INJECTION INTRAMUSCULAR; INTRAVENOUS ONCE AS NEEDED
OUTPATIENT
Start: 2025-04-04

## 2025-03-27 RX ORDER — HEPARIN 100 UNIT/ML
500 SYRINGE INTRAVENOUS
OUTPATIENT
Start: 2025-04-11

## 2025-03-27 RX ORDER — EPINEPHRINE 0.3 MG/.3ML
0.3 INJECTION SUBCUTANEOUS ONCE AS NEEDED
Status: DISCONTINUED | OUTPATIENT
Start: 2025-03-27 | End: 2025-03-27 | Stop reason: HOSPADM

## 2025-03-27 RX ORDER — HEPARIN 100 UNIT/ML
500 SYRINGE INTRAVENOUS
Status: CANCELLED | OUTPATIENT
Start: 2025-03-28

## 2025-03-27 RX ORDER — DIPHENHYDRAMINE HCL 25 MG
25 CAPSULE ORAL
Status: COMPLETED | OUTPATIENT
Start: 2025-03-27 | End: 2025-03-27

## 2025-03-27 RX ORDER — SODIUM CHLORIDE 0.9 % (FLUSH) 0.9 %
10 SYRINGE (ML) INJECTION
OUTPATIENT
Start: 2025-04-04

## 2025-03-27 RX ORDER — EPINEPHRINE 0.3 MG/.3ML
0.3 INJECTION SUBCUTANEOUS ONCE AS NEEDED
OUTPATIENT
Start: 2025-04-11

## 2025-03-27 RX ORDER — ACETAMINOPHEN 325 MG/1
650 TABLET ORAL
OUTPATIENT
Start: 2025-04-04

## 2025-03-27 RX ORDER — DIPHENHYDRAMINE HYDROCHLORIDE 50 MG/ML
50 INJECTION, SOLUTION INTRAMUSCULAR; INTRAVENOUS ONCE AS NEEDED
Status: CANCELLED | OUTPATIENT
Start: 2025-03-28

## 2025-03-27 RX ORDER — HEPARIN 100 UNIT/ML
500 SYRINGE INTRAVENOUS
Status: DISCONTINUED | OUTPATIENT
Start: 2025-03-27 | End: 2025-03-27 | Stop reason: HOSPADM

## 2025-03-27 RX ORDER — SODIUM CHLORIDE 0.9 % (FLUSH) 0.9 %
10 SYRINGE (ML) INJECTION
OUTPATIENT
Start: 2025-04-11

## 2025-03-27 RX ORDER — PROCHLORPERAZINE EDISYLATE 5 MG/ML
10 INJECTION INTRAMUSCULAR; INTRAVENOUS ONCE AS NEEDED
OUTPATIENT
Start: 2025-04-11

## 2025-03-27 RX ORDER — BORTEZOMIB 3.5 MG/1
1.3 INJECTION, POWDER, LYOPHILIZED, FOR SOLUTION INTRAVENOUS; SUBCUTANEOUS
OUTPATIENT
Start: 2025-04-11

## 2025-03-27 RX ADMIN — ACETAMINOPHEN 650 MG: 325 TABLET ORAL at 12:03

## 2025-03-27 RX ADMIN — DIPHENHYDRAMINE HYDROCHLORIDE 25 MG: 25 CAPSULE ORAL at 12:03

## 2025-03-27 RX ADMIN — BORTEZOMIB 3.25 MG: 3.5 INJECTION, POWDER, LYOPHILIZED, FOR SOLUTION INTRAVENOUS; SUBCUTANEOUS at 01:03

## 2025-03-27 RX ADMIN — DARATUMUMAB AND HYALURONIDASE-FIHJ (HUMAN RECOMBINANT) 1800 MG: 1800; 30000 INJECTION SUBCUTANEOUS at 12:03

## 2025-03-27 NOTE — PLAN OF CARE
Pt received Darzalex SQ & Velcade SQ today and tolerated well, without complications. Educated patient about Darzalex SQ & Velcade SQ (indications, side effects, possible reactions, chemotherapy precautions) and verbalized understanding. Velcade inj administered SQ to R abd, tolerated well, dsg to site. Darzalex SQ inj administered SQ to L abd, tolerated well, dsg to site. VSS. Obs for 2 hours post, no S/S of reactions. Pt DC with no distress noted, ambulated off of unit via self, w/o event, pleased. Daughter waiting for her.

## 2025-03-27 NOTE — PROGRESS NOTES
Pharmacist Patient Education Note    DaraVelRevDex chemotherapy regimen was discussed with the patient and her daughter. Medication handouts for each agent were provided to the patient.    Administration instructions were discussed including:    Cycle = 28 days      Daratumumab hycela  (Darzalex Faspro®) Bortezomib  (Velcade®) Dexamethasone  (Decadron®) Lenalidomide  (Revlimid®)   Cycles 1-2       Week 1 (Day 1) X X X Days 1-21   Week 2 (Day 8) X X X    Week 3 (Day 15) X X X    Week 4 (Day 22) X X X    Cycles 3-6        Week 1 (Day 1) X X X Days 1-21   Week 2 (Day 8)  X X    Week 3 (Day 15) X X X    Week 4 (Day 22)  X X        The following side effects were reviewed:    - Injection reaction (discussion of pre-medications used and that first daratumumab requires a 2 hours observation period)   - Prevention and treatment of nausea/vomiting   - Prophylaxis against herpes virus with acyclovir and the importance of taking the medication as prescribed. Additionally, levofloxacin will be used to prevent against bacterial infections.    - Fatigue   - Constipation/Diarrhea       - And okay to use over-the-counter medications   - Peripheral neuropathy    - Rash   - Short term side effects of high dose steroids:changes in blood pressure and blood glucose, mood swings, increased appetite, and trouble sleeping       - Discussed importance of taking dexamethasone prior to infusion appointments and with food to reduce stomach irritation; already on omeprazole. She is also on zolpidem to offset side effect of insomnia.     Patient was given further information regarding Revlimid:   - REMs program for close monitoring   - Increased risk of clots and the importance of continuing her Xarelto every day (unless instructed to hold for low platelets or preparation for a procedure)   - Avoid donating blood through treatment and up to 4 weeks after stopping Revlimid   - Administer at about the same time each day with water; administer with  or without food. Swallow capsule whole; do not break, open, or chew.   - If you miss a dose, take it as soon as you think about it ONLY if it has been less than 12 hours since your regular time. If it has been more than 12 hours, skip the missed dose and take your next dose at the regular time. If you vomit a dose, take your next dose at the regular time; do NOT take 2 doses at the same time and never double up a dose to replace the missed dose.    - Wash your hands after handling this medication. Caretakers should NOT handle this medicine with bare hands and should wear latex gloves.    - Store this medication at room temperature. Avoid storing in a pill box with other medications.       Drug-drug interactions: none    All questions were answered.      Cece Kemp, Pharm.D., [facility]  Clinical Pharmacy Specialist, Bone Marrow Transplant/Cellular Therapy  [facility]  SpectraLink: [phone]

## 2025-03-28 LAB
ALBUMIN, SPE (OHS): 3.55 G/DL (ref 3.35–5.55)
ALPHA 1 GLOB (OHS): 0.36 GM/DL (ref 0.17–0.41)
ALPHA 2 GLOB (OHS): 0.85 GM/DL (ref 0.43–0.99)
BETA GLOB (OHS): 0.76 GM/DL (ref 0.5–1.1)
GAMMA GLOBULIN (OHS): 4.18 GM/DL (ref 0.67–1.58)
KAPPA LC FREE SER-MCNC: 0.17 MG/L (ref 0.26–1.65)
KAPPA LC FREE/LAMBDA FREE SER: 1 MG/DL (ref 0.33–1.94)
LAMBDA LC FREE SERPL-MCNC: 5.97 MG/DL (ref 0.57–2.63)
PROT SERPL-MCNC: 9.7 GM/DL (ref 6–8.4)

## 2025-03-29 LAB — PATHOLOGIST REVIEW - SPE (OHS): NORMAL

## 2025-03-31 ENCOUNTER — EXTERNAL CHRONIC CARE MANAGEMENT (OUTPATIENT)
Dept: PRIMARY CARE CLINIC | Facility: CLINIC | Age: 60
End: 2025-03-31
Payer: MEDICARE

## 2025-03-31 PROCEDURE — 99490 CHRNC CARE MGMT STAFF 1ST 20: CPT | Mod: S$GLB,,, | Performed by: INTERNAL MEDICINE

## 2025-04-02 ENCOUNTER — HOSPITAL ENCOUNTER (OUTPATIENT)
Dept: RADIOLOGY | Facility: HOSPITAL | Age: 60
Discharge: HOME | End: 2025-04-02
Attending: STUDENT IN AN ORGANIZED HEALTH CARE EDUCATION/TRAINING PROGRAM
Payer: MEDICARE

## 2025-04-02 DIAGNOSIS — E88.09 PLASMA CELL DYSCRASIA: ICD-10-CM

## 2025-04-02 LAB — POCT GLUCOSE: 128 MG/DL (ref 70–110)

## 2025-04-02 PROCEDURE — A9552 F18 FDG: HCPCS | Mod: HCNC | Performed by: STUDENT IN AN ORGANIZED HEALTH CARE EDUCATION/TRAINING PROGRAM

## 2025-04-02 PROCEDURE — 78816 PET IMAGE W/CT FULL BODY: CPT | Mod: TC,HCNC

## 2025-04-02 PROCEDURE — 78816 PET IMAGE W/CT FULL BODY: CPT | Mod: 26,HCNC,PI, | Performed by: NUCLEAR MEDICINE

## 2025-04-02 RX ORDER — FLUDEOXYGLUCOSE F18 500 MCI/ML
10.41 INJECTION INTRAVENOUS
Status: COMPLETED | OUTPATIENT
Start: 2025-04-02 | End: 2025-04-02

## 2025-04-02 RX ADMIN — FLUDEOXYGLUCOSE F-18 10.41 MILLICURIE: 500 INJECTION INTRAVENOUS at 01:04

## 2025-04-03 ENCOUNTER — INFUSION (OUTPATIENT)
Dept: INFUSION THERAPY | Facility: HOSPITAL | Age: 60
End: 2025-04-03
Payer: MEDICARE

## 2025-04-03 VITALS
HEIGHT: 64 IN | SYSTOLIC BLOOD PRESSURE: 139 MMHG | TEMPERATURE: 98 F | OXYGEN SATURATION: 98 % | RESPIRATION RATE: 16 BRPM | DIASTOLIC BLOOD PRESSURE: 75 MMHG | HEART RATE: 74 BPM | WEIGHT: 282.63 LBS | BODY MASS INDEX: 48.25 KG/M2

## 2025-04-03 DIAGNOSIS — C90.00 MULTIPLE MYELOMA NOT HAVING ACHIEVED REMISSION: Primary | ICD-10-CM

## 2025-04-03 PROCEDURE — 96401 CHEMO ANTI-NEOPL SQ/IM: CPT | Mod: HCNC

## 2025-04-03 PROCEDURE — 63600175 PHARM REV CODE 636 W HCPCS: Mod: HCNC | Performed by: INTERNAL MEDICINE

## 2025-04-03 PROCEDURE — 25000003 PHARM REV CODE 250: Mod: HCNC | Performed by: INTERNAL MEDICINE

## 2025-04-03 RX ORDER — BORTEZOMIB 3.5 MG/1
1.3 INJECTION, POWDER, LYOPHILIZED, FOR SOLUTION INTRAVENOUS; SUBCUTANEOUS
Status: COMPLETED | OUTPATIENT
Start: 2025-04-03 | End: 2025-04-03

## 2025-04-03 RX ORDER — DIPHENHYDRAMINE HYDROCHLORIDE 50 MG/ML
50 INJECTION, SOLUTION INTRAMUSCULAR; INTRAVENOUS ONCE AS NEEDED
Status: DISCONTINUED | OUTPATIENT
Start: 2025-04-03 | End: 2025-04-03 | Stop reason: HOSPADM

## 2025-04-03 RX ORDER — HEPARIN 100 UNIT/ML
500 SYRINGE INTRAVENOUS
Status: DISCONTINUED | OUTPATIENT
Start: 2025-04-03 | End: 2025-04-03 | Stop reason: HOSPADM

## 2025-04-03 RX ORDER — PROCHLORPERAZINE EDISYLATE 5 MG/ML
10 INJECTION INTRAMUSCULAR; INTRAVENOUS ONCE AS NEEDED
Status: DISCONTINUED | OUTPATIENT
Start: 2025-04-03 | End: 2025-04-03 | Stop reason: HOSPADM

## 2025-04-03 RX ORDER — EPINEPHRINE 0.3 MG/.3ML
0.3 INJECTION SUBCUTANEOUS ONCE AS NEEDED
Status: DISCONTINUED | OUTPATIENT
Start: 2025-04-03 | End: 2025-04-03 | Stop reason: HOSPADM

## 2025-04-03 RX ORDER — ACETAMINOPHEN 325 MG/1
650 TABLET ORAL
Status: COMPLETED | OUTPATIENT
Start: 2025-04-03 | End: 2025-04-03

## 2025-04-03 RX ORDER — SODIUM CHLORIDE 0.9 % (FLUSH) 0.9 %
10 SYRINGE (ML) INJECTION
Status: DISCONTINUED | OUTPATIENT
Start: 2025-04-03 | End: 2025-04-03 | Stop reason: HOSPADM

## 2025-04-03 RX ORDER — DIPHENHYDRAMINE HCL 25 MG
25 CAPSULE ORAL
Status: COMPLETED | OUTPATIENT
Start: 2025-04-03 | End: 2025-04-03

## 2025-04-03 RX ADMIN — DIPHENHYDRAMINE HYDROCHLORIDE 25 MG: 25 CAPSULE ORAL at 09:04

## 2025-04-03 RX ADMIN — BORTEZOMIB 3.25 MG: 3.5 INJECTION, POWDER, LYOPHILIZED, FOR SOLUTION INTRAVENOUS; SUBCUTANEOUS at 09:04

## 2025-04-03 RX ADMIN — DARATUMUMAB AND HYALURONIDASE-FIHJ (HUMAN RECOMBINANT) 1800 MG: 1800; 30000 INJECTION SUBCUTANEOUS at 09:04

## 2025-04-03 RX ADMIN — ACETAMINOPHEN 650 MG: 325 TABLET ORAL at 09:04

## 2025-04-03 NOTE — NURSING
Pt given SubQ Manisha and velcade injection. Tolerated well. Care explained, all questions answered.

## 2025-04-04 ENCOUNTER — RESULTS FOLLOW-UP (OUTPATIENT)
Dept: HEMATOLOGY/ONCOLOGY | Facility: CLINIC | Age: 60
End: 2025-04-04

## 2025-04-04 ENCOUNTER — PATIENT MESSAGE (OUTPATIENT)
Dept: HEMATOLOGY/ONCOLOGY | Facility: CLINIC | Age: 60
End: 2025-04-04
Payer: MEDICARE

## 2025-04-06 DIAGNOSIS — R10.9 ABDOMINAL CRAMPING: ICD-10-CM

## 2025-04-06 NOTE — TELEPHONE ENCOUNTER
No care due was identified.  [employer] Embedded Care Due Messages. Reference number: [id number].   4/06/2025 5:17:42 PM CDT

## 2025-04-07 RX ORDER — DICYCLOMINE HYDROCHLORIDE 10 MG/1
CAPSULE ORAL
Qty: 30 CAPSULE | Refills: 0 | Status: SHIPPED | OUTPATIENT
Start: 2025-04-07

## 2025-04-09 ENCOUNTER — RESULTS FOLLOW-UP (OUTPATIENT)
Dept: HEMATOLOGY/ONCOLOGY | Facility: CLINIC | Age: 60
End: 2025-04-09

## 2025-04-09 ENCOUNTER — LAB VISIT (OUTPATIENT)
Dept: LAB | Facility: HOSPITAL | Age: 60
End: 2025-04-09
Payer: MEDICARE

## 2025-04-09 DIAGNOSIS — C90.00 MULTIPLE MYELOMA NOT HAVING ACHIEVED REMISSION: ICD-10-CM

## 2025-04-09 LAB
ABSOLUTE EOSINOPHIL (OHS): 0.07 K/UL
ABSOLUTE MONOCYTE (OHS): 0.36 K/UL (ref 0.3–1)
ABSOLUTE NEUTROPHIL COUNT (OHS): 1.95 K/UL (ref 1.8–7.7)
ALBUMIN SERPL BCP-MCNC: 2.6 G/DL (ref 3.5–5.2)
ALP SERPL-CCNC: 57 UNIT/L (ref 40–150)
ALT SERPL W/O P-5'-P-CCNC: 26 UNIT/L (ref 10–44)
ANION GAP (OHS): 4 MMOL/L (ref 8–16)
AST SERPL-CCNC: 11 UNIT/L (ref 11–45)
BASOPHILS # BLD AUTO: 0 K/UL
BASOPHILS NFR BLD AUTO: 0 %
BILIRUB SERPL-MCNC: 0.2 MG/DL (ref 0.1–1)
BUN SERPL-MCNC: 8 MG/DL (ref 6–20)
CALCIUM SERPL-MCNC: 8.4 MG/DL (ref 8.7–10.5)
CHLORIDE SERPL-SCNC: 107 MMOL/L (ref 95–110)
CO2 SERPL-SCNC: 29 MMOL/L (ref 23–29)
CREAT SERPL-MCNC: 0.7 MG/DL (ref 0.5–1.4)
ERYTHROCYTE [DISTWIDTH] IN BLOOD BY AUTOMATED COUNT: 15.7 % (ref 11.5–14.5)
GFR SERPLBLD CREATININE-BSD FMLA CKD-EPI: >60 ML/MIN/1.73/M2
GLUCOSE SERPL-MCNC: 68 MG/DL (ref 70–110)
HCT VFR BLD AUTO: 31.4 % (ref 37–48.5)
HGB BLD-MCNC: 10.4 GM/DL (ref 12–16)
IMM GRANULOCYTES # BLD AUTO: 0.01 K/UL (ref 0–0.04)
IMM GRANULOCYTES NFR BLD AUTO: 0.3 % (ref 0–0.5)
INDIRECT COOMBS: NORMAL
LYMPHOCYTES # BLD AUTO: 0.87 K/UL (ref 1–4.8)
MCH RBC QN AUTO: 30.2 PG (ref 27–31)
MCHC RBC AUTO-ENTMCNC: 33.1 G/DL (ref 32–36)
MCV RBC AUTO: 91 FL (ref 82–98)
NUCLEATED RBC (/100WBC) (OHS): 0 /100 WBC
PLATELET # BLD AUTO: 168 K/UL (ref 150–450)
PMV BLD AUTO: 12.7 FL (ref 9.2–12.9)
POTASSIUM SERPL-SCNC: 3.5 MMOL/L (ref 3.5–5.1)
PROT SERPL-MCNC: 7 GM/DL (ref 6–8.4)
RBC # BLD AUTO: 3.44 M/UL (ref 4–5.4)
RELATIVE EOSINOPHIL (OHS): 2.1 %
RELATIVE LYMPHOCYTE (OHS): 26.7 % (ref 18–48)
RELATIVE MONOCYTE (OHS): 11 % (ref 4–15)
RELATIVE NEUTROPHIL (OHS): 59.9 % (ref 38–73)
RH BLD: NORMAL
SODIUM SERPL-SCNC: 140 MMOL/L (ref 136–145)
SPECIMEN OUTDATE: NORMAL
WBC # BLD AUTO: 3.26 K/UL (ref 3.9–12.7)

## 2025-04-09 PROCEDURE — 36415 COLL VENOUS BLD VENIPUNCTURE: CPT | Mod: HCNC

## 2025-04-09 PROCEDURE — 86850 RBC ANTIBODY SCREEN: CPT | Mod: HCNC

## 2025-04-09 PROCEDURE — 85025 COMPLETE CBC W/AUTO DIFF WBC: CPT | Mod: HCNC

## 2025-04-09 PROCEDURE — 82040 ASSAY OF SERUM ALBUMIN: CPT | Mod: HCNC

## 2025-04-10 ENCOUNTER — CLINICAL SUPPORT (OUTPATIENT)
Dept: CARDIOLOGY | Facility: HOSPITAL | Age: 60
End: 2025-04-10
Payer: MEDICARE

## 2025-04-10 ENCOUNTER — PATIENT MESSAGE (OUTPATIENT)
Dept: NEUROLOGY | Facility: CLINIC | Age: 60
End: 2025-04-10
Payer: MEDICARE

## 2025-04-10 ENCOUNTER — CLINICAL SUPPORT (OUTPATIENT)
Dept: CARDIOLOGY | Facility: HOSPITAL | Age: 60
End: 2025-04-10
Attending: INTERNAL MEDICINE
Payer: MEDICARE

## 2025-04-10 ENCOUNTER — INFUSION (OUTPATIENT)
Dept: INFUSION THERAPY | Facility: HOSPITAL | Age: 60
End: 2025-04-10
Payer: MEDICARE

## 2025-04-10 VITALS
WEIGHT: 291.88 LBS | HEART RATE: 83 BPM | BODY MASS INDEX: 49.83 KG/M2 | HEIGHT: 64 IN | SYSTOLIC BLOOD PRESSURE: 129 MMHG | TEMPERATURE: 98 F | RESPIRATION RATE: 17 BRPM | DIASTOLIC BLOOD PRESSURE: 56 MMHG

## 2025-04-10 DIAGNOSIS — C90.00 MULTIPLE MYELOMA NOT HAVING ACHIEVED REMISSION: Primary | ICD-10-CM

## 2025-04-10 DIAGNOSIS — I44.2 ATRIOVENTRICULAR BLOCK, COMPLETE: ICD-10-CM

## 2025-04-10 DIAGNOSIS — Z95.810 PRESENCE OF AUTOMATIC (IMPLANTABLE) CARDIAC DEFIBRILLATOR: ICD-10-CM

## 2025-04-10 DIAGNOSIS — I42.8 OTHER CARDIOMYOPATHIES: ICD-10-CM

## 2025-04-10 PROCEDURE — 93295 DEV INTERROG REMOTE 1/2/MLT: CPT | Mod: HCNC,,, | Performed by: INTERNAL MEDICINE

## 2025-04-10 PROCEDURE — 93296 REM INTERROG EVL PM/IDS: CPT | Mod: HCNC | Performed by: INTERNAL MEDICINE

## 2025-04-10 PROCEDURE — 25000003 PHARM REV CODE 250: Mod: HCNC | Performed by: INTERNAL MEDICINE

## 2025-04-10 PROCEDURE — 96401 CHEMO ANTI-NEOPL SQ/IM: CPT | Mod: HCNC

## 2025-04-10 PROCEDURE — 63600175 PHARM REV CODE 636 W HCPCS: Mod: JZ,TB,HCNC | Performed by: INTERNAL MEDICINE

## 2025-04-10 RX ORDER — DIPHENHYDRAMINE HCL 25 MG
25 CAPSULE ORAL
Status: COMPLETED | OUTPATIENT
Start: 2025-04-10 | End: 2025-04-10

## 2025-04-10 RX ORDER — HEPARIN 100 UNIT/ML
500 SYRINGE INTRAVENOUS
Status: DISCONTINUED | OUTPATIENT
Start: 2025-04-10 | End: 2025-04-10 | Stop reason: HOSPADM

## 2025-04-10 RX ORDER — DIPHENHYDRAMINE HYDROCHLORIDE 50 MG/ML
50 INJECTION, SOLUTION INTRAMUSCULAR; INTRAVENOUS ONCE AS NEEDED
Status: DISCONTINUED | OUTPATIENT
Start: 2025-04-10 | End: 2025-04-10 | Stop reason: HOSPADM

## 2025-04-10 RX ORDER — BORTEZOMIB 3.5 MG/1
1.3 INJECTION, POWDER, LYOPHILIZED, FOR SOLUTION INTRAVENOUS; SUBCUTANEOUS
Status: COMPLETED | OUTPATIENT
Start: 2025-04-10 | End: 2025-04-10

## 2025-04-10 RX ORDER — PROCHLORPERAZINE EDISYLATE 5 MG/ML
10 INJECTION INTRAMUSCULAR; INTRAVENOUS ONCE AS NEEDED
Status: DISCONTINUED | OUTPATIENT
Start: 2025-04-10 | End: 2025-04-10 | Stop reason: HOSPADM

## 2025-04-10 RX ORDER — ACETAMINOPHEN 325 MG/1
650 TABLET ORAL
Status: COMPLETED | OUTPATIENT
Start: 2025-04-10 | End: 2025-04-10

## 2025-04-10 RX ORDER — EPINEPHRINE 0.3 MG/.3ML
0.3 INJECTION SUBCUTANEOUS ONCE AS NEEDED
Status: DISCONTINUED | OUTPATIENT
Start: 2025-04-10 | End: 2025-04-10 | Stop reason: HOSPADM

## 2025-04-10 RX ORDER — SODIUM CHLORIDE 0.9 % (FLUSH) 0.9 %
10 SYRINGE (ML) INJECTION
Status: DISCONTINUED | OUTPATIENT
Start: 2025-04-10 | End: 2025-04-10 | Stop reason: HOSPADM

## 2025-04-10 RX ADMIN — DIPHENHYDRAMINE HYDROCHLORIDE 25 MG: 25 CAPSULE ORAL at 09:04

## 2025-04-10 RX ADMIN — BORTEZOMIB 3.25 MG: 3.5 INJECTION, POWDER, LYOPHILIZED, FOR SOLUTION INTRAVENOUS; SUBCUTANEOUS at 09:04

## 2025-04-10 RX ADMIN — DARATUMUMAB AND HYALURONIDASE-FIHJ (HUMAN RECOMBINANT) 1800 MG: 1800; 30000 INJECTION SUBCUTANEOUS at 09:04

## 2025-04-10 RX ADMIN — ACETAMINOPHEN 650 MG: 325 TABLET ORAL at 09:04

## 2025-04-10 NOTE — NURSING
Patient seated in chair, VSS, assessment done. Manisha & velcade SQ injections administered to lower abdomen, tolerated well.  Pt ambulated off floor independently, NAD.

## 2025-04-11 ENCOUNTER — PATIENT MESSAGE (OUTPATIENT)
Dept: NEUROLOGY | Facility: CLINIC | Age: 60
End: 2025-04-11
Payer: MEDICARE

## 2025-04-11 DIAGNOSIS — C90.00 MULTIPLE MYELOMA NOT HAVING ACHIEVED REMISSION: ICD-10-CM

## 2025-04-11 LAB
OHS CV AF BURDEN PERCENT: < 1
OHS CV BIV PACING PERCENT: 94 %
OHS CV DC REMOTE DEVICE TYPE: NORMAL
OHS CV ICD SHOCK: NO
OHS CV RV PACING PERCENT: 83 %

## 2025-04-14 DIAGNOSIS — C90.00 MULTIPLE MYELOMA NOT HAVING ACHIEVED REMISSION: ICD-10-CM

## 2025-04-14 RX ORDER — LENALIDOMIDE 25 MG/1
CAPSULE ORAL
Qty: 21 CAPSULE | Refills: 0 | Status: SHIPPED | OUTPATIENT
Start: 2025-04-14

## 2025-04-14 RX ORDER — LENALIDOMIDE 25 MG/1
25 CAPSULE ORAL DAILY
Qty: 21 CAPSULE | Refills: 0 | Status: SHIPPED | OUTPATIENT
Start: 2025-04-14

## 2025-04-16 ENCOUNTER — PATIENT MESSAGE (OUTPATIENT)
Dept: NEUROLOGY | Facility: CLINIC | Age: 60
End: 2025-04-16
Payer: MEDICARE

## 2025-04-16 ENCOUNTER — TELEPHONE (OUTPATIENT)
Dept: HEMATOLOGY/ONCOLOGY | Facility: CLINIC | Age: 60
End: 2025-04-16
Payer: MEDICARE

## 2025-04-16 NOTE — TELEPHONE ENCOUNTER
----- Message from Migdalia sent at 4/16/2025 12:22 PM CDT -----  Regarding: Patient advice  Contact: [phone] Ext [phone]  Name of Caller: Tiara with [facility] Contact Preference:  [phone] Ext [phone] Nature of Call:  Requesting a call to confirm pt's daughter Silverio Dey has been HIPAA verified and they are able to speak to her about pt care

## 2025-04-17 ENCOUNTER — TELEPHONE (OUTPATIENT)
Dept: HEMATOLOGY/ONCOLOGY | Facility: CLINIC | Age: 60
End: 2025-04-17
Payer: MEDICARE

## 2025-04-17 ENCOUNTER — INFUSION (OUTPATIENT)
Dept: INFUSION THERAPY | Facility: HOSPITAL | Age: 60
End: 2025-04-17
Payer: MEDICARE

## 2025-04-17 VITALS
OXYGEN SATURATION: 98 % | RESPIRATION RATE: 18 BRPM | WEIGHT: 290.38 LBS | BODY MASS INDEX: 49.84 KG/M2 | DIASTOLIC BLOOD PRESSURE: 61 MMHG | HEART RATE: 84 BPM | SYSTOLIC BLOOD PRESSURE: 132 MMHG

## 2025-04-17 DIAGNOSIS — C90.00 MULTIPLE MYELOMA NOT HAVING ACHIEVED REMISSION: Primary | ICD-10-CM

## 2025-04-17 PROCEDURE — 63600175 PHARM REV CODE 636 W HCPCS: Mod: JZ,TB,HCNC | Performed by: INTERNAL MEDICINE

## 2025-04-17 PROCEDURE — 96401 CHEMO ANTI-NEOPL SQ/IM: CPT | Mod: HCNC

## 2025-04-17 RX ORDER — EPINEPHRINE 0.3 MG/.3ML
0.3 INJECTION SUBCUTANEOUS ONCE AS NEEDED
Status: DISCONTINUED | OUTPATIENT
Start: 2025-04-17 | End: 2025-04-17 | Stop reason: HOSPADM

## 2025-04-17 RX ORDER — BORTEZOMIB 3.5 MG/1
1.3 INJECTION, POWDER, LYOPHILIZED, FOR SOLUTION INTRAVENOUS; SUBCUTANEOUS
Status: COMPLETED | OUTPATIENT
Start: 2025-04-17 | End: 2025-04-17

## 2025-04-17 RX ORDER — PROCHLORPERAZINE EDISYLATE 5 MG/ML
10 INJECTION INTRAMUSCULAR; INTRAVENOUS ONCE AS NEEDED
Status: DISCONTINUED | OUTPATIENT
Start: 2025-04-17 | End: 2025-04-17 | Stop reason: HOSPADM

## 2025-04-17 RX ORDER — DIPHENHYDRAMINE HYDROCHLORIDE 50 MG/ML
50 INJECTION, SOLUTION INTRAMUSCULAR; INTRAVENOUS ONCE AS NEEDED
Status: DISCONTINUED | OUTPATIENT
Start: 2025-04-17 | End: 2025-04-17 | Stop reason: HOSPADM

## 2025-04-17 RX ADMIN — DARATUMUMAB AND HYALURONIDASE-FIHJ (HUMAN RECOMBINANT) 1800 MG: 1800; 30000 INJECTION SUBCUTANEOUS at 09:04

## 2025-04-17 RX ADMIN — BORTEZOMIB 3.25 MG: 3.5 INJECTION, POWDER, LYOPHILIZED, FOR SOLUTION INTRAVENOUS; SUBCUTANEOUS at 09:04

## 2025-04-17 NOTE — NURSING
Pt given SubQ Manisha and velcade injections to abd. Tolerated well. Care explained, all questions answered.

## 2025-04-17 NOTE — TELEPHONE ENCOUNTER
Left message with patient daughter regarding pt medications as well as clinic callback number at the request of Amna Chawla.

## 2025-04-17 NOTE — PROGRESS NOTES
Section of Hematology and Stem Cell Transplantation  Follow-Up Note     04/23/2025  Primary Oncologic Diagnosis: Multiple myeloma not having achieved remission [C90.00]    History of Present Ilness:   Amna Chawla (Alisa) is a pleasant 59 y.o.female from [place], [place], here for follow up of newly diagnosed myeloma. Her co-morbidities include CHB c/b cardiac arrest s/p CRT-D, AFib on Xarelto, HFrEF with improved EF (LVEF 50-55%), prior CVA, HTN, depression, SHIRA on CPAP.     Oncologic History:   She was hospitalized from 1/29 - 2/02 with left upper quadrant pain and was found to have small bowel enteritis. She improved with antibiotics and symptomatic care. During hospitalization, myeloma labs were ordered to assess elevated protein gap. She was found to have IgG lambda with M protein of 3.43 g/dL and subsequently referred to Hematology clinic. Since hospitalization, she has been feeling overall well. She denies fevers, night sweats, weight loss, bone pain.   2/2/2025: Elevated M protein noted 3.43 g/dL with ANALI showing IgG lambda, normal FLC, mild anemia  Pathology:  Bone marrow biopsy  done on 3/10/25. 60% clonal plasma cells. FISH pending.  4/2/24: PET CT with no hypermetabolic lesions concerning for active disease.    Interval History 04/23/2025:  Patient is here for follow up prior to cycle 2 of lion-VRd. She reports one episode of nausea, relieved with anti-emetics. She also reports that she is having trouble with sleep around the days she takes the steroids and her ambien isn't working for this issue. She is otherwise without complaints. Brief discussion of transplant, and they met with [facility] for initial information. Denies fever, chills, drenching night sweats, unexplained weight loss, early satiety, chest pain, shortness of breath, new/worsening bone pain, adenopathy, emesis, diarrhea.       Past Medical History, Social History, and Past Family History are unchanged since last evaluation except for  HPI.    Past Medical History:   Diagnosis Date    Anticoagulant long-term use     Anxiety     Asthma     Atrial fibrillation     Brain anoxic injury     Cervicalgia 8/28/2014    CHI (closed head injury) 2/19/2013    Convulsion 5/30/2015    Decreased ROM of left shoulder 4/12/2017    Defibrillator activation 2013    Depression     Heart block     History of sudden cardiac arrest 2/2013    PEA arrest with subsequent long-QT    Hx of psychiatric care     Hypertension     Left atrial enlargement 4/11/2018    Pacemaker 2013    Paresthesia 11/1/2013    Prolonged Q-T interval on ECG 2/8/2013    Psychiatric problem     Seizures     Sleep difficulties     Stroke     weakness lt side    Therapy     Thyroid disease     Upper airway resistance syndrome 2/21/2017        CURRENT MEDICATIONS:   Current Outpatient Medications   Medication Sig    acyclovir (ZOVIRAX) 400 MG tablet Take 1 tablet (400 mg total) by mouth 2 (two) times daily.    carvediloL (COREG) 12.5 MG tablet Take 1 tablet (12.5 mg total) by mouth 2 (two) times daily.    clonazePAM (KLONOPIN) 1 MG tablet TAKE 1 TABLET BY MOUTH DAILY AS NEEDED FOR ANXIETY    dexAMETHasone (DECADRON) 4 MG Tab Take 10 tablets (40 mg total) by mouth every 7 days. Take with food.    dicyclomine (BENTYL) 10 MG capsule TAKE 1 CAPSULE(10 MG) BY MOUTH TWICE DAILY AS NEEDED FOR ABDOMINAL CRAMPS    EPINEPHrine (EPIPEN) 0.3 mg/0.3 mL AtIn Inject 0.3 mLs (0.3 mg total) into the muscle once. for 1 dose    ergocalciferol (ERGOCALCIFEROL) 50,000 unit Cap Take 1 capsule (50,000 Units total) by mouth every 7 days.    furosemide (LASIX) 20 MG tablet Take 1 tablet (20 mg total) by mouth daily as needed. Take as needed for weight gain (2-3 lbs/day or 5 lbs/week), shortness of breath, or leg swelling    lenalidomide 25 mg Cap TAKE ONE CAPSULE (25MG) BY MOUTH ONCE DAILY FOR 21 DAYS ON, 7 DAYS OFF OF EACH 28 DAYS CYCLE.    lenalidomide 25 mg Cap Take 1 capsule (25 mg total) by mouth once daily on days 1-21  of each 28 day cycle. [employer] authorization #[id number] 4/14/25.    losartan (COZAAR) 100 MG tablet Take 1 tablet (100 mg total) by mouth once daily.    omeprazole (PRILOSEC) 40 MG capsule Take 1 capsule (40 mg total) by mouth once daily. Take 30 minutes before breakfast    ondansetron (ZOFRAN-ODT) 4 MG TbDL Dissolve 2 tablets (8 mg total) by mouth every 6 (six) hours as needed.    polyethylene glycol (GLYCOLAX) 17 gram PwPk Take 17 g by mouth once daily.    prochlorperazine (COMPAZINE) 5 MG tablet Take 2 tablets (10 mg total) by mouth every 6 (six) hours as needed for Nausea.    rivaroxaban (XARELTO) 20 mg Tab Take 1 tablet (20 mg total) by mouth daily with dinner or evening meal.    rosuvastatin (CRESTOR) 40 MG Tab Take 1 tablet (40 mg total) by mouth every evening.    tiaGABine (GABITRIL) 4 MG tablet Take 1 tablet (4 mg total) by mouth every evening.    zolpidem (AMBIEN) 10 mg Tab Take 1 tablet (10 mg total) by mouth nightly as needed (insomnia).     Current Facility-Administered Medications   Medication    cyanocobalamin tablet 100 mcg    onabotulinumtoxina injection 200 Units    onabotulinumtoxina injection 200 Units    onabotulinumtoxina injection 200 Units    onabotulinumtoxina injection 200 Units    onabotulinumtoxina injection 200 Units    onabotulinumtoxina injection 200 Units    onabotulinumtoxina injection 200 Units    onabotulinumtoxina injection 200 Units     Facility-Administered Medications Ordered in Other Visits   Medication    lactated ringers infusion    lidocaine (PF) 10 mg/ml (1%) injection 5 mg       ALLERGIES:   Review of patient's allergies indicates:   Allergen Reactions    Aspirin Hives    Imitrex [sumatriptan] Palpitations    Penicillins Hives and Swelling    Shellfish containing products Anaphylaxis     seafood    Reglan [metoclopramide hcl] Other (See Comments)     Parkinsonism        Review of Systems:     Review of Systems   Constitutional:  Negative for chills, fever, malaise/fatigue  and weight loss.   HENT:  Negative for congestion, nosebleeds, sinus pain and sore throat.    Eyes:  Negative for blurred vision, double vision, photophobia and pain.   Respiratory:  Negative for cough and shortness of breath.    Cardiovascular:  Negative for chest pain and palpitations.   Gastrointestinal:  Negative for constipation, diarrhea, heartburn, nausea and vomiting.   Genitourinary:  Negative for dysuria and hematuria.   Musculoskeletal:  Negative for back pain and falls.   Skin:  Negative for rash.   Neurological:  Negative for dizziness, sensory change, weakness and headaches.   Endo/Heme/Allergies:  Negative for environmental allergies.   Psychiatric/Behavioral:  The patient does not have insomnia.        Physical Exam:     Vitals:    04/23/25 0947   BP: 130/60   Pulse: 89   Resp: 17         Physical Exam  Vitals reviewed.   Constitutional:       General: She is not in acute distress.     Appearance: Normal appearance. She is obese. She is not ill-appearing.   HENT:      Head: Normocephalic and atraumatic.      Nose: Nose normal.      Mouth/Throat:      Mouth: Mucous membranes are moist.      Pharynx: Oropharynx is clear. No oropharyngeal exudate.   Eyes:      Pupils: Pupils are equal, round, and reactive to light.   Cardiovascular:      Rate and Rhythm: Normal rate and regular rhythm.      Heart sounds: No murmur heard.  Pulmonary:      Effort: Pulmonary effort is normal.      Breath sounds: Normal breath sounds. No wheezing.   Abdominal:      General: Bowel sounds are normal.      Palpations: Abdomen is soft.      Tenderness: There is no abdominal tenderness.   Musculoskeletal:         General: Normal range of motion.      Cervical back: Normal range of motion and neck supple.      Right lower leg: Edema present.      Left lower leg: Edema present.   Skin:     General: Skin is warm and dry.      Capillary Refill: Capillary refill takes less than 2 seconds.      Findings: No bruising, lesion or rash.    Neurological:      Mental Status: She is alert and oriented to person, place, and time.      Motor: No weakness.   Psychiatric:         Mood and Affect: Mood normal.         Behavior: Behavior normal.         Thought Content: Thought content normal.          ECOG Performance Status: (foot note - ECOG PS provided by Eastern Cooperative Oncology Group) 1 - Symptomatic but completely ambulatory    Karnofsky Performance Score:  100%- Normal, No Complaints, No Evidence of Disease      Labs:   Lab Results   Component Value Date    WBC 4.01 2025    HGB 11.2 (L) 2025    HCT 33.9 (L) 2025    MCV 93 2025     2025       Lab Results   Component Value Date     2025    K 4.1 2025     2025    CO2 28 2025    BUN 12 2025    CREATININE 0.8 2025    ALBUMIN 2.7 (L) 2025    BILITOT 0.3 2025    ALKPHOS 68 2025    AST 11 2025    ALT 32 2025       Imagin/2/25 WB PET CT  Impression:  History of recently diagnosed myeloma.  No hypermetabolic lesions concerning for active disease.  Nonspecific hypermetabolic lymph nodes in the head and neck.  Attention on follow-up.    Pathology:  3/10/25 Bone Marrow Biopsy  RIGHT ILIAC CREST BONE MARROW ASPIRATE, BONE MARROW CLOT, AND BONE MARROW CORE BIOPSY WITH:     CELLULARITY= 70-80%, TRILINEAGE HEMATOPOIETIC ACTIVITY (M/E= 2.9:1).   PLASMA CELL NEOPLASM (65%).  SEE COMMENT.   GRADE I RETICULAR FIBROSIS.   STORAGE IRON NOT IDENTIFIED.   CONGO RED NEGATIVE.   ADEQUATE NUMBER OF MEGAKARYOCYTES.      Assessment and Plan:     Multiple myeloma not having achieved remission  - IgG lambda multiple myeloma, standard-risk by FISH, R-ISS Stage II  - Pre-treatment labs: m-protein 3.43 g/dL, lambda sFLC 6.43  - Normal calcium and renal function  - Pharmacy teaching and consented today  - 3/27/25: initiated lion-VRd, 25 mg lenalidomide days 1-21 and dexamethasone 40 mg weekly  - Plan for repeat  BMBx and PET scan after 4 cycles  - Contact me with any uncontrolled symptoms.    Immunodeficiency due to drugs  - Secondary to treatment, start acyclovir two times daily for shingles prophylaxis    Neutropenia, unspecified type  - Secondary to multiple myeloma  - Mild, will monitor for improvement after treatment start    Other thrombophilia  - Secondary to MM and revlimid, continue xarelto daily for VTE prophylaxis    Anemia in neoplastic disease  - Secondary to multiple myeloma, stable, asymptomatic    Vitamin D deficiency  - Noted to be deficient on recent labs  - Continue ergocalciferol 50,000 units weekly    Obesity  - Long-standing, multiple chronic co-morbidities  - Ambulatory, no difficulty performing ADLs        BMT Chart Routing      Follow up with physician    Follow up with FERNANDO 1 month. Shannon: MM follow up, as scheduled   Provider visit type    Infusion scheduling note    Injection scheduling note Move 5/15 to AM injections, please, keep all others   Labs CBC, CMP, free light chains, immunofixation, immunoglobulins and SPEP   Scheduling:  Preferred lab:  Lab interval:  move 5/21 labs to 5/20 or 5/19 at same time   Imaging    Pharmacy appointment    Other referrals                   Orders Placed:      Orders Placed This Encounter    CBC Auto Differential    Comprehensive Metabolic Panel    Immunofixation, Serum    Immunoglobulin Free LT Chains Blood    Immunoglobulins (IgG, IgA, IgM) Quantitative    Protein Electrophoresis, Serum         Total time of this visit was 35 minutes, including time spent face to face with patient and/or via video/audio, and also in preparing for today's visit for MDM and documentation. (Medical Decision Making, including consideration of possible diagnoses, management options, complex medical record review, review of diagnostic tests and information, consideration and discussion of significant complications based on comorbidities, and discussion with providers involved with  the care of the patient). Greater than 50% was spent face to face with the patient counseling and coordinating care.    Visit today included increased complexity associated with the care of the episodic problem lab review addressed and managing the longitudinal care of the patient due to the serious and/or complex managed problem(s) multiple myeloma.     Thank you for allowing me to partake in the care of this patient. If there are any questions, please do not hesitate to reach out.    Shannon Culver, ALFREDOP-C  Hematologic Malignancies, Stem Cell Transplantation, and Cellular Therapy  [facility]

## 2025-04-21 ENCOUNTER — LAB VISIT (OUTPATIENT)
Dept: LAB | Facility: HOSPITAL | Age: 60
End: 2025-04-21
Attending: INTERNAL MEDICINE
Payer: MEDICARE

## 2025-04-21 DIAGNOSIS — C90.00 MULTIPLE MYELOMA NOT HAVING ACHIEVED REMISSION: ICD-10-CM

## 2025-04-21 LAB
ABSOLUTE EOSINOPHIL (OHS): 0.02 K/UL
ABSOLUTE MONOCYTE (OHS): 0.38 K/UL (ref 0.3–1)
ABSOLUTE NEUTROPHIL COUNT (OHS): 2.44 K/UL (ref 1.8–7.7)
ALBUMIN SERPL BCP-MCNC: 2.7 G/DL (ref 3.5–5.2)
ALP SERPL-CCNC: 68 UNIT/L (ref 40–150)
ALT SERPL W/O P-5'-P-CCNC: 32 UNIT/L (ref 10–44)
ANION GAP (OHS): 5 MMOL/L (ref 8–16)
AST SERPL-CCNC: 11 UNIT/L (ref 11–45)
BASOPHILS # BLD AUTO: 0.01 K/UL
BASOPHILS NFR BLD AUTO: 0.2 %
BILIRUB SERPL-MCNC: 0.3 MG/DL (ref 0.1–1)
BUN SERPL-MCNC: 12 MG/DL (ref 6–20)
CALCIUM SERPL-MCNC: 9.1 MG/DL (ref 8.7–10.5)
CHLORIDE SERPL-SCNC: 108 MMOL/L (ref 95–110)
CO2 SERPL-SCNC: 28 MMOL/L (ref 23–29)
CREAT SERPL-MCNC: 0.8 MG/DL (ref 0.5–1.4)
ERYTHROCYTE [DISTWIDTH] IN BLOOD BY AUTOMATED COUNT: 16.7 % (ref 11.5–14.5)
GFR SERPLBLD CREATININE-BSD FMLA CKD-EPI: >60 ML/MIN/1.73/M2
GLUCOSE SERPL-MCNC: 91 MG/DL (ref 70–110)
HCT VFR BLD AUTO: 33.9 % (ref 37–48.5)
HGB BLD-MCNC: 11.2 GM/DL (ref 12–16)
IGA SERPL-MCNC: 28 MG/DL (ref 40–350)
IGG SERPL-MCNC: 1311 MG/DL (ref 650–1600)
IGM SERPL-MCNC: 37 MG/DL (ref 50–300)
IMM GRANULOCYTES # BLD AUTO: 0.01 K/UL (ref 0–0.04)
IMM GRANULOCYTES NFR BLD AUTO: 0.2 % (ref 0–0.5)
LYMPHOCYTES # BLD AUTO: 1.15 K/UL (ref 1–4.8)
MCH RBC QN AUTO: 30.7 PG (ref 27–31)
MCHC RBC AUTO-ENTMCNC: 33 G/DL (ref 32–36)
MCV RBC AUTO: 93 FL (ref 82–98)
NUCLEATED RBC (/100WBC) (OHS): 1 /100 WBC
PLATELET # BLD AUTO: 165 K/UL (ref 150–450)
PMV BLD AUTO: 11.6 FL (ref 9.2–12.9)
POTASSIUM SERPL-SCNC: 4.1 MMOL/L (ref 3.5–5.1)
PROT SERPL-MCNC: 6.3 GM/DL (ref 6–8.4)
RBC # BLD AUTO: 3.65 M/UL (ref 4–5.4)
RELATIVE EOSINOPHIL (OHS): 0.5 %
RELATIVE LYMPHOCYTE (OHS): 28.7 % (ref 18–48)
RELATIVE MONOCYTE (OHS): 9.5 % (ref 4–15)
RELATIVE NEUTROPHIL (OHS): 60.9 % (ref 38–73)
SODIUM SERPL-SCNC: 141 MMOL/L (ref 136–145)
WBC # BLD AUTO: 4.01 K/UL (ref 3.9–12.7)

## 2025-04-21 PROCEDURE — 84165 PROTEIN E-PHORESIS SERUM: CPT | Mod: HCNC

## 2025-04-21 PROCEDURE — 83521 IG LIGHT CHAINS FREE EACH: CPT

## 2025-04-21 PROCEDURE — 82784 ASSAY IGA/IGD/IGG/IGM EACH: CPT | Mod: HCNC

## 2025-04-21 PROCEDURE — 85025 COMPLETE CBC W/AUTO DIFF WBC: CPT | Mod: HCNC

## 2025-04-21 PROCEDURE — 36415 COLL VENOUS BLD VENIPUNCTURE: CPT | Mod: HCNC

## 2025-04-21 PROCEDURE — 83521 IG LIGHT CHAINS FREE EACH: CPT | Mod: HCNC

## 2025-04-21 PROCEDURE — 86334 IMMUNOFIX E-PHORESIS SERUM: CPT | Mod: HCNC

## 2025-04-21 PROCEDURE — 80053 COMPREHEN METABOLIC PANEL: CPT | Mod: HCNC

## 2025-04-22 ENCOUNTER — RESULTS FOLLOW-UP (OUTPATIENT)
Dept: HEMATOLOGY/ONCOLOGY | Facility: CLINIC | Age: 60
End: 2025-04-22

## 2025-04-22 LAB
ALBUMIN, SPE (OHS): 3.01 G/DL (ref 3.35–5.55)
ALPHA 1 GLOB (OHS): 0.3 GM/DL (ref 0.17–0.41)
ALPHA 2 GLOB (OHS): 0.74 GM/DL (ref 0.43–0.99)
BETA GLOB (OHS): 0.73 GM/DL (ref 0.5–1.1)
GAMMA GLOBULIN (OHS): 1.12 GM/DL (ref 0.67–1.58)
KAPPA LC FREE SER-MCNC: 0.99 MG/L (ref 0.26–1.65)
KAPPA LC FREE/LAMBDA FREE SER: 0.73 MG/DL (ref 0.33–1.94)
LAMBDA LC FREE SERPL-MCNC: 0.74 MG/DL (ref 0.57–2.63)
PATHOLOGIST INTERPRETATION - IFE SERUM (OHS): NORMAL
PATHOLOGIST REVIEW - SPE (OHS): NORMAL
PROT SERPL-MCNC: 5.9 GM/DL (ref 6–8.4)

## 2025-04-23 ENCOUNTER — OFFICE VISIT (OUTPATIENT)
Dept: HEMATOLOGY/ONCOLOGY | Facility: CLINIC | Age: 60
End: 2025-04-23
Payer: MEDICARE

## 2025-04-23 VITALS
HEART RATE: 89 BPM | OXYGEN SATURATION: 99 % | RESPIRATION RATE: 17 BRPM | HEIGHT: 64 IN | SYSTOLIC BLOOD PRESSURE: 130 MMHG | DIASTOLIC BLOOD PRESSURE: 60 MMHG | BODY MASS INDEX: 49.09 KG/M2 | WEIGHT: 287.56 LBS

## 2025-04-23 DIAGNOSIS — D63.0 ANEMIA IN NEOPLASTIC DISEASE: ICD-10-CM

## 2025-04-23 DIAGNOSIS — E66.01 CLASS 3 SEVERE OBESITY DUE TO EXCESS CALORIES WITH SERIOUS COMORBIDITY AND BODY MASS INDEX (BMI) OF 45.0 TO 49.9 IN ADULT: ICD-10-CM

## 2025-04-23 DIAGNOSIS — E66.813 CLASS 3 SEVERE OBESITY DUE TO EXCESS CALORIES WITH SERIOUS COMORBIDITY AND BODY MASS INDEX (BMI) OF 45.0 TO 49.9 IN ADULT: ICD-10-CM

## 2025-04-23 DIAGNOSIS — Z79.899 IMMUNODEFICIENCY DUE TO DRUGS: ICD-10-CM

## 2025-04-23 DIAGNOSIS — E55.9 VITAMIN D DEFICIENCY: ICD-10-CM

## 2025-04-23 DIAGNOSIS — D84.821 IMMUNODEFICIENCY DUE TO DRUGS: ICD-10-CM

## 2025-04-23 DIAGNOSIS — D68.69 OTHER THROMBOPHILIA: ICD-10-CM

## 2025-04-23 DIAGNOSIS — C90.00 MULTIPLE MYELOMA NOT HAVING ACHIEVED REMISSION: Primary | ICD-10-CM

## 2025-04-23 PROCEDURE — 99999 PR PBB SHADOW E&M-EST. PATIENT-LVL V: CPT | Mod: PBBFAC,HCNC,,

## 2025-04-23 RX ORDER — PROCHLORPERAZINE EDISYLATE 5 MG/ML
10 INJECTION INTRAMUSCULAR; INTRAVENOUS ONCE AS NEEDED
OUTPATIENT
Start: 2025-05-01

## 2025-04-23 RX ORDER — HEPARIN 100 UNIT/ML
500 SYRINGE INTRAVENOUS
OUTPATIENT
Start: 2025-05-15

## 2025-04-23 RX ORDER — BORTEZOMIB 3.5 MG/1
1.3 INJECTION, POWDER, LYOPHILIZED, FOR SOLUTION INTRAVENOUS; SUBCUTANEOUS
Status: CANCELLED | OUTPATIENT
Start: 2025-04-24

## 2025-04-23 RX ORDER — BORTEZOMIB 3.5 MG/1
1.3 INJECTION, POWDER, LYOPHILIZED, FOR SOLUTION INTRAVENOUS; SUBCUTANEOUS
OUTPATIENT
Start: 2025-05-01

## 2025-04-23 RX ORDER — DIPHENHYDRAMINE HYDROCHLORIDE 50 MG/ML
50 INJECTION, SOLUTION INTRAMUSCULAR; INTRAVENOUS ONCE AS NEEDED
OUTPATIENT
Start: 2025-05-15

## 2025-04-23 RX ORDER — BORTEZOMIB 3.5 MG/1
1.3 INJECTION, POWDER, LYOPHILIZED, FOR SOLUTION INTRAVENOUS; SUBCUTANEOUS
OUTPATIENT
Start: 2025-05-15

## 2025-04-23 RX ORDER — EPINEPHRINE 0.3 MG/.3ML
0.3 INJECTION SUBCUTANEOUS ONCE AS NEEDED
OUTPATIENT
Start: 2025-05-15

## 2025-04-23 RX ORDER — BORTEZOMIB 3.5 MG/1
1.3 INJECTION, POWDER, LYOPHILIZED, FOR SOLUTION INTRAVENOUS; SUBCUTANEOUS
OUTPATIENT
Start: 2025-05-08

## 2025-04-23 RX ORDER — EPINEPHRINE 0.3 MG/.3ML
0.3 INJECTION SUBCUTANEOUS ONCE AS NEEDED
Status: CANCELLED | OUTPATIENT
Start: 2025-04-24

## 2025-04-23 RX ORDER — HEPARIN 100 UNIT/ML
500 SYRINGE INTRAVENOUS
Status: CANCELLED | OUTPATIENT
Start: 2025-04-24

## 2025-04-23 RX ORDER — SODIUM CHLORIDE 0.9 % (FLUSH) 0.9 %
10 SYRINGE (ML) INJECTION
OUTPATIENT
Start: 2025-05-01

## 2025-04-23 RX ORDER — DIPHENHYDRAMINE HYDROCHLORIDE 50 MG/ML
50 INJECTION, SOLUTION INTRAMUSCULAR; INTRAVENOUS ONCE AS NEEDED
OUTPATIENT
Start: 2025-05-08

## 2025-04-23 RX ORDER — SODIUM CHLORIDE 0.9 % (FLUSH) 0.9 %
10 SYRINGE (ML) INJECTION
OUTPATIENT
Start: 2025-05-08

## 2025-04-23 RX ORDER — PROCHLORPERAZINE EDISYLATE 5 MG/ML
10 INJECTION INTRAMUSCULAR; INTRAVENOUS ONCE AS NEEDED
Status: CANCELLED | OUTPATIENT
Start: 2025-04-24

## 2025-04-23 RX ORDER — EPINEPHRINE 0.3 MG/.3ML
0.3 INJECTION SUBCUTANEOUS ONCE AS NEEDED
OUTPATIENT
Start: 2025-05-08

## 2025-04-23 RX ORDER — SODIUM CHLORIDE 0.9 % (FLUSH) 0.9 %
10 SYRINGE (ML) INJECTION
Status: CANCELLED | OUTPATIENT
Start: 2025-04-24

## 2025-04-23 RX ORDER — PROCHLORPERAZINE EDISYLATE 5 MG/ML
10 INJECTION INTRAMUSCULAR; INTRAVENOUS ONCE AS NEEDED
OUTPATIENT
Start: 2025-05-08

## 2025-04-23 RX ORDER — DIPHENHYDRAMINE HYDROCHLORIDE 50 MG/ML
50 INJECTION, SOLUTION INTRAMUSCULAR; INTRAVENOUS ONCE AS NEEDED
OUTPATIENT
Start: 2025-05-01

## 2025-04-23 RX ORDER — HEPARIN 100 UNIT/ML
500 SYRINGE INTRAVENOUS
OUTPATIENT
Start: 2025-05-01

## 2025-04-23 RX ORDER — HEPARIN 100 UNIT/ML
500 SYRINGE INTRAVENOUS
OUTPATIENT
Start: 2025-05-08

## 2025-04-23 RX ORDER — SODIUM CHLORIDE 0.9 % (FLUSH) 0.9 %
10 SYRINGE (ML) INJECTION
OUTPATIENT
Start: 2025-05-15

## 2025-04-23 RX ORDER — EPINEPHRINE 0.3 MG/.3ML
0.3 INJECTION SUBCUTANEOUS ONCE AS NEEDED
OUTPATIENT
Start: 2025-05-01

## 2025-04-23 RX ORDER — DIPHENHYDRAMINE HYDROCHLORIDE 50 MG/ML
50 INJECTION, SOLUTION INTRAMUSCULAR; INTRAVENOUS ONCE AS NEEDED
Status: CANCELLED | OUTPATIENT
Start: 2025-04-24

## 2025-04-23 RX ORDER — PROCHLORPERAZINE EDISYLATE 5 MG/ML
10 INJECTION INTRAMUSCULAR; INTRAVENOUS ONCE AS NEEDED
OUTPATIENT
Start: 2025-05-15

## 2025-04-24 ENCOUNTER — INFUSION (OUTPATIENT)
Dept: INFUSION THERAPY | Facility: HOSPITAL | Age: 60
End: 2025-04-24
Payer: MEDICARE

## 2025-04-24 ENCOUNTER — PATIENT MESSAGE (OUTPATIENT)
Dept: NEUROLOGY | Facility: CLINIC | Age: 60
End: 2025-04-24
Payer: MEDICARE

## 2025-04-24 VITALS
DIASTOLIC BLOOD PRESSURE: 65 MMHG | BODY MASS INDEX: 49.75 KG/M2 | SYSTOLIC BLOOD PRESSURE: 152 MMHG | WEIGHT: 291.44 LBS | HEART RATE: 77 BPM | RESPIRATION RATE: 18 BRPM | TEMPERATURE: 98 F | HEIGHT: 64 IN

## 2025-04-24 DIAGNOSIS — C90.00 MULTIPLE MYELOMA NOT HAVING ACHIEVED REMISSION: Primary | ICD-10-CM

## 2025-04-24 PROCEDURE — 96401 CHEMO ANTI-NEOPL SQ/IM: CPT | Mod: HCNC

## 2025-04-24 PROCEDURE — 63600175 PHARM REV CODE 636 W HCPCS: Mod: JZ,TB,HCNC

## 2025-04-24 RX ORDER — SODIUM CHLORIDE 0.9 % (FLUSH) 0.9 %
10 SYRINGE (ML) INJECTION
Status: DISCONTINUED | OUTPATIENT
Start: 2025-04-24 | End: 2025-04-24 | Stop reason: HOSPADM

## 2025-04-24 RX ORDER — BORTEZOMIB 3.5 MG/1
1.3 INJECTION, POWDER, LYOPHILIZED, FOR SOLUTION INTRAVENOUS; SUBCUTANEOUS
Status: COMPLETED | OUTPATIENT
Start: 2025-04-24 | End: 2025-04-24

## 2025-04-24 RX ORDER — PROCHLORPERAZINE EDISYLATE 5 MG/ML
10 INJECTION INTRAMUSCULAR; INTRAVENOUS ONCE AS NEEDED
Status: DISCONTINUED | OUTPATIENT
Start: 2025-04-24 | End: 2025-04-24 | Stop reason: HOSPADM

## 2025-04-24 RX ORDER — HEPARIN 100 UNIT/ML
500 SYRINGE INTRAVENOUS
Status: DISCONTINUED | OUTPATIENT
Start: 2025-04-24 | End: 2025-04-24 | Stop reason: HOSPADM

## 2025-04-24 RX ORDER — EPINEPHRINE 0.3 MG/.3ML
0.3 INJECTION SUBCUTANEOUS ONCE AS NEEDED
Status: DISCONTINUED | OUTPATIENT
Start: 2025-04-24 | End: 2025-04-24 | Stop reason: HOSPADM

## 2025-04-24 RX ORDER — DIPHENHYDRAMINE HYDROCHLORIDE 50 MG/ML
50 INJECTION, SOLUTION INTRAMUSCULAR; INTRAVENOUS ONCE AS NEEDED
Status: DISCONTINUED | OUTPATIENT
Start: 2025-04-24 | End: 2025-04-24 | Stop reason: HOSPADM

## 2025-04-24 RX ADMIN — BORTEZOMIB 3.25 MG: 3.5 INJECTION, POWDER, LYOPHILIZED, FOR SOLUTION INTRAVENOUS; SUBCUTANEOUS at 11:04

## 2025-04-24 RX ADMIN — DARATUMUMAB AND HYALURONIDASE-FIHJ (HUMAN RECOMBINANT) 1800 MG: 1800; 30000 INJECTION SUBCUTANEOUS at 11:04

## 2025-04-28 DIAGNOSIS — Z00.00 ENCOUNTER FOR MEDICARE ANNUAL WELLNESS EXAM: ICD-10-CM

## 2025-04-30 ENCOUNTER — EXTERNAL CHRONIC CARE MANAGEMENT (OUTPATIENT)
Dept: PRIMARY CARE CLINIC | Facility: CLINIC | Age: 60
End: 2025-04-30
Payer: MEDICARE

## 2025-04-30 PROCEDURE — 99490 CHRNC CARE MGMT STAFF 1ST 20: CPT | Mod: S$GLB,,, | Performed by: INTERNAL MEDICINE

## 2025-05-01 ENCOUNTER — INFUSION (OUTPATIENT)
Dept: INFUSION THERAPY | Facility: HOSPITAL | Age: 60
End: 2025-05-01
Payer: MEDICARE

## 2025-05-01 VITALS
RESPIRATION RATE: 18 BRPM | BODY MASS INDEX: 49.75 KG/M2 | HEIGHT: 64 IN | TEMPERATURE: 98 F | WEIGHT: 291.44 LBS | SYSTOLIC BLOOD PRESSURE: 123 MMHG | DIASTOLIC BLOOD PRESSURE: 60 MMHG | HEART RATE: 70 BPM

## 2025-05-01 DIAGNOSIS — C90.00 MULTIPLE MYELOMA NOT HAVING ACHIEVED REMISSION: Primary | ICD-10-CM

## 2025-05-01 PROCEDURE — 63600175 PHARM REV CODE 636 W HCPCS: Mod: JZ,TB,HCNC

## 2025-05-01 PROCEDURE — 96401 CHEMO ANTI-NEOPL SQ/IM: CPT | Mod: HCNC

## 2025-05-01 RX ORDER — BORTEZOMIB 3.5 MG/1
1.3 INJECTION, POWDER, LYOPHILIZED, FOR SOLUTION INTRAVENOUS; SUBCUTANEOUS
Status: COMPLETED | OUTPATIENT
Start: 2025-05-01 | End: 2025-05-01

## 2025-05-01 RX ORDER — PROCHLORPERAZINE EDISYLATE 5 MG/ML
10 INJECTION INTRAMUSCULAR; INTRAVENOUS ONCE AS NEEDED
Status: DISCONTINUED | OUTPATIENT
Start: 2025-05-01 | End: 2025-05-01 | Stop reason: HOSPADM

## 2025-05-01 RX ORDER — HEPARIN 100 UNIT/ML
500 SYRINGE INTRAVENOUS
Status: DISCONTINUED | OUTPATIENT
Start: 2025-05-01 | End: 2025-05-01 | Stop reason: HOSPADM

## 2025-05-01 RX ORDER — SODIUM CHLORIDE 0.9 % (FLUSH) 0.9 %
10 SYRINGE (ML) INJECTION
Status: DISCONTINUED | OUTPATIENT
Start: 2025-05-01 | End: 2025-05-01 | Stop reason: HOSPADM

## 2025-05-01 RX ORDER — EPINEPHRINE 0.3 MG/.3ML
0.3 INJECTION SUBCUTANEOUS ONCE AS NEEDED
Status: DISCONTINUED | OUTPATIENT
Start: 2025-05-01 | End: 2025-05-01 | Stop reason: HOSPADM

## 2025-05-01 RX ORDER — DIPHENHYDRAMINE HYDROCHLORIDE 50 MG/ML
50 INJECTION, SOLUTION INTRAMUSCULAR; INTRAVENOUS ONCE AS NEEDED
Status: DISCONTINUED | OUTPATIENT
Start: 2025-05-01 | End: 2025-05-01 | Stop reason: HOSPADM

## 2025-05-01 RX ADMIN — BORTEZOMIB 3.25 MG: 3.5 INJECTION, POWDER, LYOPHILIZED, FOR SOLUTION INTRAVENOUS; SUBCUTANEOUS at 10:05

## 2025-05-01 RX ADMIN — DARATUMUMAB AND HYALURONIDASE-FIHJ (HUMAN RECOMBINANT) 1800 MG: 1800; 30000 INJECTION SUBCUTANEOUS at 10:05

## 2025-05-02 ENCOUNTER — PATIENT MESSAGE (OUTPATIENT)
Dept: HEMATOLOGY/ONCOLOGY | Facility: CLINIC | Age: 60
End: 2025-05-02
Payer: MEDICARE

## 2025-05-07 ENCOUNTER — PATIENT MESSAGE (OUTPATIENT)
Dept: NEUROLOGY | Facility: CLINIC | Age: 60
End: 2025-05-07
Payer: COMMERCIAL

## 2025-05-08 ENCOUNTER — INFUSION (OUTPATIENT)
Dept: INFUSION THERAPY | Facility: HOSPITAL | Age: 60
End: 2025-05-08
Payer: MEDICARE

## 2025-05-08 VITALS
SYSTOLIC BLOOD PRESSURE: 123 MMHG | RESPIRATION RATE: 16 BRPM | OXYGEN SATURATION: 100 % | DIASTOLIC BLOOD PRESSURE: 56 MMHG | BODY MASS INDEX: 50.02 KG/M2 | HEART RATE: 83 BPM | HEIGHT: 64 IN | WEIGHT: 293 LBS

## 2025-05-08 DIAGNOSIS — C90.00 MULTIPLE MYELOMA NOT HAVING ACHIEVED REMISSION: ICD-10-CM

## 2025-05-08 DIAGNOSIS — C90.00 MULTIPLE MYELOMA NOT HAVING ACHIEVED REMISSION: Primary | ICD-10-CM

## 2025-05-08 PROCEDURE — 96401 CHEMO ANTI-NEOPL SQ/IM: CPT | Mod: HCNC

## 2025-05-08 PROCEDURE — 63600175 PHARM REV CODE 636 W HCPCS: Mod: JZ,TB,HCNC

## 2025-05-08 RX ORDER — EPINEPHRINE 0.3 MG/.3ML
0.3 INJECTION SUBCUTANEOUS ONCE AS NEEDED
Status: DISCONTINUED | OUTPATIENT
Start: 2025-05-08 | End: 2025-05-08 | Stop reason: HOSPADM

## 2025-05-08 RX ORDER — PROCHLORPERAZINE EDISYLATE 5 MG/ML
10 INJECTION INTRAMUSCULAR; INTRAVENOUS ONCE AS NEEDED
Status: DISCONTINUED | OUTPATIENT
Start: 2025-05-08 | End: 2025-05-08 | Stop reason: HOSPADM

## 2025-05-08 RX ORDER — LENALIDOMIDE 25 MG/1
1 CAPSULE ORAL DAILY
Qty: 21 CAPSULE | Refills: 0 | Status: SHIPPED | OUTPATIENT
Start: 2025-05-08

## 2025-05-08 RX ORDER — BORTEZOMIB 3.5 MG/1
1.3 INJECTION, POWDER, LYOPHILIZED, FOR SOLUTION INTRAVENOUS; SUBCUTANEOUS
Status: COMPLETED | OUTPATIENT
Start: 2025-05-08 | End: 2025-05-08

## 2025-05-08 RX ORDER — DIPHENHYDRAMINE HYDROCHLORIDE 50 MG/ML
50 INJECTION, SOLUTION INTRAMUSCULAR; INTRAVENOUS ONCE AS NEEDED
Status: DISCONTINUED | OUTPATIENT
Start: 2025-05-08 | End: 2025-05-08 | Stop reason: HOSPADM

## 2025-05-08 RX ADMIN — DARATUMUMAB AND HYALURONIDASE-FIHJ (HUMAN RECOMBINANT) 1800 MG: 1800; 30000 INJECTION SUBCUTANEOUS at 09:05

## 2025-05-08 RX ADMIN — BORTEZOMIB 3.25 MG: 3.5 INJECTION, POWDER, LYOPHILIZED, FOR SOLUTION INTRAVENOUS; SUBCUTANEOUS at 09:05

## 2025-05-08 NOTE — NURSING
Pt given SubQ Manisha and Velcade injections. Tolerated well. Care explained, all questions answered.

## 2025-05-13 ENCOUNTER — TELEPHONE (OUTPATIENT)
Dept: NEUROLOGY | Facility: CLINIC | Age: 60
End: 2025-05-13
Payer: COMMERCIAL

## 2025-05-13 NOTE — TELEPHONE ENCOUNTER
Called patient to schedule botox appt on 5/23 at 9:20 am with Dr. Cortez. Patient verbalized agreement

## 2025-05-15 ENCOUNTER — INFUSION (OUTPATIENT)
Dept: INFUSION THERAPY | Facility: HOSPITAL | Age: 60
End: 2025-05-15
Payer: MEDICARE

## 2025-05-15 VITALS
HEART RATE: 75 BPM | WEIGHT: 293 LBS | RESPIRATION RATE: 18 BRPM | OXYGEN SATURATION: 99 % | BODY MASS INDEX: 50.48 KG/M2 | SYSTOLIC BLOOD PRESSURE: 159 MMHG | DIASTOLIC BLOOD PRESSURE: 74 MMHG

## 2025-05-15 DIAGNOSIS — C90.00 MULTIPLE MYELOMA NOT HAVING ACHIEVED REMISSION: Primary | ICD-10-CM

## 2025-05-15 PROCEDURE — 96401 CHEMO ANTI-NEOPL SQ/IM: CPT | Mod: HCNC

## 2025-05-15 PROCEDURE — 63600175 PHARM REV CODE 636 W HCPCS: Mod: HCNC

## 2025-05-15 RX ORDER — DIPHENHYDRAMINE HYDROCHLORIDE 50 MG/ML
50 INJECTION, SOLUTION INTRAMUSCULAR; INTRAVENOUS ONCE AS NEEDED
Status: DISCONTINUED | OUTPATIENT
Start: 2025-05-15 | End: 2025-05-15 | Stop reason: HOSPADM

## 2025-05-15 RX ORDER — BORTEZOMIB 3.5 MG/1
1.3 INJECTION, POWDER, LYOPHILIZED, FOR SOLUTION INTRAVENOUS; SUBCUTANEOUS
Status: COMPLETED | OUTPATIENT
Start: 2025-05-15 | End: 2025-05-15

## 2025-05-15 RX ORDER — EPINEPHRINE 0.3 MG/.3ML
0.3 INJECTION SUBCUTANEOUS ONCE AS NEEDED
Status: DISCONTINUED | OUTPATIENT
Start: 2025-05-15 | End: 2025-05-15 | Stop reason: HOSPADM

## 2025-05-15 RX ORDER — PROCHLORPERAZINE EDISYLATE 5 MG/ML
10 INJECTION INTRAMUSCULAR; INTRAVENOUS ONCE AS NEEDED
Status: DISCONTINUED | OUTPATIENT
Start: 2025-05-15 | End: 2025-05-15 | Stop reason: HOSPADM

## 2025-05-15 RX ADMIN — DARATUMUMAB AND HYALURONIDASE-FIHJ (HUMAN RECOMBINANT) 1800 MG: 1800; 30000 INJECTION SUBCUTANEOUS at 09:05

## 2025-05-15 RX ADMIN — BORTEZOMIB 3.25 MG: 3.5 INJECTION, POWDER, LYOPHILIZED, FOR SOLUTION INTRAVENOUS; SUBCUTANEOUS at 09:05

## 2025-05-15 NOTE — PROGRESS NOTES
Section of Hematology and Stem Cell Transplantation  Follow-Up Note     05/21/2025  Primary Oncologic Diagnosis: Hypokalemia [E87.6]    History of Present Ilness:   Amna Chawla (Alisa) is a pleasant 59 y.o.female from [place], [place], here for follow up of newly diagnosed myeloma. Her co-morbidities include CHB c/b cardiac arrest s/p CRT-D, AFib on Xarelto, HFrEF with improved EF (LVEF 50-55%), prior CVA, HTN, depression, SHIRA on CPAP.     Oncologic History:   She was hospitalized from 1/29 - 2/02 with left upper quadrant pain and was found to have small bowel enteritis. She improved with antibiotics and symptomatic care. During hospitalization, myeloma labs were ordered to assess elevated protein gap. She was found to have IgG lambda with M protein of 3.43 g/dL and subsequently referred to Hematology clinic. Since hospitalization, she has been feeling overall well. She denies fevers, night sweats, weight loss, bone pain.   2/2/2025: Elevated M protein noted 3.43 g/dL with ANALI showing IgG lambda, normal FLC, mild anemia  Pathology:  Bone marrow biopsy  done on 3/10/25. 60% clonal plasma cells. FISH pending.  4/2/24: PET CT with no hypermetabolic lesions concerning for active disease.    Interval History 05/21/2025:  Patient with her family, here for follow up prior to cycle 3 of lion-VRd. She reports that after her last treatment last week she did too much and felt queasy with nausea and diarrhea and was in bed for a few days but improved now. Additionally, she reports new difficulty with long-distance walking, possibly due to recently being less active. She has altered and everything tastes the same, so she doesn't want to eat. She is still eating meals, just about 1/2 of what she used to. Denies fever, chills, drenching night sweats, unexplained weight loss, early satiety, chest pain, shortness of breath, new/worsening bone pain, adenopathy, N/V/D, and peripheral neuropathy.       Past Medical History, Social  History, and Past Family History are unchanged since last evaluation except for HPI.    Past Medical History:   Diagnosis Date    Anticoagulant long-term use     Anxiety     Asthma     Atrial fibrillation     Brain anoxic injury     Cervicalgia 8/28/2014    CHI (closed head injury) 2/19/2013    Convulsion 5/30/2015    Decreased ROM of left shoulder 4/12/2017    Defibrillator activation 2013    Depression     Heart block     History of sudden cardiac arrest 2/2013    PEA arrest with subsequent long-QT    Hx of psychiatric care     Hypertension     Left atrial enlargement 4/11/2018    Pacemaker 2013    Paresthesia 11/1/2013    Prolonged Q-T interval on ECG 2/8/2013    Psychiatric problem     Seizures     Sleep difficulties     Stroke     weakness lt side    Therapy     Thyroid disease     Upper airway resistance syndrome 2/21/2017        CURRENT MEDICATIONS:   Current Outpatient Medications   Medication Sig    acyclovir (ZOVIRAX) 400 MG tablet Take 1 tablet (400 mg total) by mouth 2 (two) times daily.    ARIPiprazole (ABILIFY) 15 MG Tab Take 15 mg by mouth.    carvediloL (COREG) 12.5 MG tablet Take 1 tablet (12.5 mg total) by mouth 2 (two) times daily.    clonazePAM (KLONOPIN) 1 MG tablet TAKE 1 TABLET BY MOUTH DAILY AS NEEDED FOR ANXIETY    dexAMETHasone (DECADRON) 4 MG Tab Take 10 tablets (40 mg total) by mouth every 7 days. Take with food.    dicyclomine (BENTYL) 10 MG capsule TAKE 1 CAPSULE(10 MG) BY MOUTH TWICE DAILY AS NEEDED FOR ABDOMINAL CRAMPS    ergocalciferol (ERGOCALCIFEROL) 50,000 unit Cap Take 1 capsule (50,000 Units total) by mouth every 7 days.    furosemide (LASIX) 20 MG tablet Take 1 tablet (20 mg total) by mouth daily as needed. Take as needed for weight gain (2-3 lbs/day or 5 lbs/week), shortness of breath, or leg swelling    lenalidomide 25 mg Cap Take 1 capsule (25 mg total) by mouth once daily on days 1-21 of each 28 day cycle. [employer] authorization #[id number] 4/14/25.    lenalidomide 25 mg Cap  Take 1 capsule (25 mg total) by mouth once daily On days 1-21 of a 28 day cycle. [facility] [id number] 5/8/25.    losartan (COZAAR) 100 MG tablet Take 1 tablet (100 mg total) by mouth once daily.    omeprazole (PRILOSEC) 40 MG capsule Take 1 capsule (40 mg total) by mouth once daily. Take 30 minutes before breakfast    ondansetron (ZOFRAN-ODT) 4 MG TbDL Dissolve 2 tablets (8 mg total) by mouth every 6 (six) hours as needed.    polyethylene glycol (GLYCOLAX) 17 gram PwPk Take 17 g by mouth once daily.    prochlorperazine (COMPAZINE) 5 MG tablet Take 2 tablets (10 mg total) by mouth every 6 (six) hours as needed for Nausea.    rivaroxaban (XARELTO) 20 mg Tab Take 1 tablet (20 mg total) by mouth daily with dinner or evening meal.    rosuvastatin (CRESTOR) 40 MG Tab Take 1 tablet (40 mg total) by mouth every evening.    tiaGABine (GABITRIL) 4 MG tablet Take 1 tablet (4 mg total) by mouth every evening.    tiZANidine (ZANAFLEX) 4 MG tablet Take by mouth 2 (two) times daily.    zolpidem (AMBIEN) 10 mg Tab Take 1 tablet (10 mg total) by mouth nightly as needed (insomnia).    EPINEPHrine (EPIPEN) 0.3 mg/0.3 mL AtIn Inject 0.3 mLs (0.3 mg total) into the muscle once. for 1 dose    potassium chloride SA (K-DUR,KLOR-CON) 20 MEQ tablet Take 1 tablet (20 mEq total) by mouth 2 (two) times daily.     Current Facility-Administered Medications   Medication    cyanocobalamin tablet 100 mcg    onabotulinumtoxina injection 200 Units    onabotulinumtoxina injection 200 Units    onabotulinumtoxina injection 200 Units    onabotulinumtoxina injection 200 Units    onabotulinumtoxina injection 200 Units    onabotulinumtoxina injection 200 Units    onabotulinumtoxina injection 200 Units    onabotulinumtoxina injection 200 Units     Facility-Administered Medications Ordered in Other Visits   Medication    lactated ringers infusion    lidocaine (PF) 10 mg/ml (1%) injection 5 mg       ALLERGIES:   Review of patient's allergies indicates:   Allergen  Reactions    Aspirin Hives    Imitrex [sumatriptan] Palpitations    Penicillins Hives and Swelling    Shellfish containing products Anaphylaxis     seafood    Reglan [metoclopramide hcl] Other (See Comments)     Parkinsonism        Review of Systems:     Review of Systems   Constitutional:  Negative for chills, fever, malaise/fatigue and weight loss.   HENT:  Negative for congestion, nosebleeds, sinus pain and sore throat.    Eyes:  Negative for blurred vision, double vision, photophobia and pain.   Respiratory:  Negative for cough and shortness of breath.    Cardiovascular:  Negative for chest pain and palpitations.   Gastrointestinal:  Negative for constipation, diarrhea, heartburn, nausea and vomiting.   Genitourinary:  Negative for dysuria and hematuria.   Musculoskeletal:  Negative for back pain and falls.   Skin:  Negative for rash.   Neurological:  Negative for dizziness, sensory change, weakness and headaches.   Endo/Heme/Allergies:  Negative for environmental allergies.   Psychiatric/Behavioral:  The patient does not have insomnia.        Physical Exam:     Vitals:    05/21/25 1052   BP: (!) 148/83   Pulse: 61   Temp: 98.2 °F (36.8 °C)       Physical Exam  Vitals reviewed.   Constitutional:       General: She is not in acute distress.     Appearance: Normal appearance. She is obese. She is not ill-appearing.   HENT:      Head: Normocephalic and atraumatic.      Nose: Nose normal.      Mouth/Throat:      Mouth: Mucous membranes are moist.      Pharynx: Oropharynx is clear. No oropharyngeal exudate.   Eyes:      Pupils: Pupils are equal, round, and reactive to light.   Cardiovascular:      Rate and Rhythm: Normal rate and regular rhythm.      Heart sounds: No murmur heard.  Pulmonary:      Effort: Pulmonary effort is normal.      Breath sounds: Normal breath sounds. No wheezing.   Abdominal:      General: Bowel sounds are normal.      Palpations: Abdomen is soft.      Tenderness: There is no abdominal  tenderness.   Musculoskeletal:         General: Normal range of motion.      Cervical back: Normal range of motion and neck supple.      Right lower leg: Edema present.      Left lower leg: Edema present.   Skin:     General: Skin is warm and dry.      Capillary Refill: Capillary refill takes less than 2 seconds.      Findings: No bruising, lesion or rash.   Neurological:      Mental Status: She is alert and oriented to person, place, and time.      Motor: No weakness.   Psychiatric:         Mood and Affect: Mood normal.         Behavior: Behavior normal.         Thought Content: Thought content normal.          ECOG Performance Status: (foot note - ECOG PS provided by Eastern Cooperative Oncology Group) 1 - Symptomatic but completely ambulatory    Karnofsky Performance Score:  90%- Able to Carry on Normal Activity: Minor Symptoms of Disease      Labs:   Lab Results   Component Value Date    WBC 3.05 (L) 2025    HGB 11.1 (L) 2025    HCT 33.3 (L) 2025    MCV 89 2025     2025       Lab Results   Component Value Date     2025    K 3.2 (L) 2025     2025    CO2 23 2025    BUN 13 2025    CREATININE 1.0 2025    ALBUMIN 2.9 (L) 2025    BILITOT 0.3 2025    ALKPHOS 69 2025    AST 15 2025    ALT 22 2025       Imagin/2/25 WB PET CT  Impression:  History of recently diagnosed myeloma.  No hypermetabolic lesions concerning for active disease.  Nonspecific hypermetabolic lymph nodes in the head and neck.  Attention on follow-up.    Pathology:  3/10/25 Bone Marrow Biopsy  RIGHT ILIAC CREST BONE MARROW ASPIRATE, BONE MARROW CLOT, AND BONE MARROW CORE BIOPSY WITH:     CELLULARITY= 70-80%, TRILINEAGE HEMATOPOIETIC ACTIVITY (M/E= 2.9:1).   PLASMA CELL NEOPLASM (65%).  SEE COMMENT.   GRADE I RETICULAR FIBROSIS.   STORAGE IRON NOT IDENTIFIED.   CONGO RED NEGATIVE.   ADEQUATE NUMBER OF MEGAKARYOCYTES.      Assessment  and Plan:     Multiple myeloma not having achieved remission  - IgG lambda multiple myeloma, standard-risk by FISH, R-ISS Stage II  - Pre-treatment labs: m-protein 3.43 g/dL, lambda sFLC 6.43  - Normal calcium and renal function  - Patient is not a transplant candidate due to extensive cardiac history, per Dr. De La Garza  - 3/27/25: initiated manisha-VRd, 25 mg lenalidomide days 1-21 and dexamethasone 40 mg weekly  - 5/20/25 biochemical studies stable for cycle 3, myeloma studies pending but improving m-spike  - Plan for repeat BMBx and PET scan after 4 cycles on 7/30/25  - Contact me with any uncontrolled symptoms.    Immunodeficiency due to drugs  - Secondary to treatment, start acyclovir two times daily for shingles prophylaxis    Neutropenia, unspecified type  - Secondary to multiple myeloma  - Mild, will monitor for improvement after treatment start    Other thrombophilia  - Secondary to MM and revlimid, continue xarelto daily for VTE prophylaxis    Anemia in neoplastic disease  - Secondary to multiple myeloma, stable, asymptomatic    Leukopenia  - Noted on recent labs, above dose-adjustment threshold    Vitamin D deficiency  - Noted to be deficient on recent labs  - Continue ergocalciferol 50,000 units weekly    Obesity  - Long-standing, multiple chronic co-morbidities  - Ambulatory, no difficulty performing ADLs    Hypokalemia  - Mild, secondary to altered taste  - Start potassium supplement    Altered Taste  - Secondary to chemotherapy, weight is overall stable  - [facility] dietary handout provided for taste alteration  - Offered dietician consult but likely not to help with this type of alteration       BMT Chart Routing      Follow up with physician Amandeep Porter: 1 week after 7/30 restaging   Follow up with FERNANDO 1 month. Shannon: either 6/18 or 6/19 prior to treatment   Provider visit type    Infusion scheduling note    Injection scheduling note 6/19: Manisha/Velcade, 6/26: Velcade, 7/3: manisha/velcade, 7/10: velcade (thursdays)    Labs CBC, CMP, free light chains, immunofixation, immunoglobulins and SPEP   Scheduling:  Preferred lab:  Lab interval:  6/17: 0900   Imaging PET scan   PET + sedation BMBx on 7/30/25   Pharmacy appointment    Other referrals                     Orders Placed:      Orders Placed This Encounter    NM PET CT FDG Whole Body    potassium chloride SA (K-DUR,KLOR-CON) 20 MEQ tablet    Case Request Endoscopy: Biopsy-bone marrow         Total time of this visit was 35 minutes, including time spent face to face with patient and/or via video/audio, and also in preparing for today's visit for MDM and documentation. (Medical Decision Making, including consideration of possible diagnoses, management options, complex medical record review, review of diagnostic tests and information, consideration and discussion of significant complications based on comorbidities, and discussion with providers involved with the care of the patient). Greater than 50% was spent face to face with the patient counseling and coordinating care.    Visit today included increased complexity associated with the care of the episodic problem lab review addressed and managing the longitudinal care of the patient due to the serious and/or complex managed problem(s) multiple myeloma.     Thank you for allowing me to partake in the care of this patient. If there are any questions, please do not hesitate to reach out.    Shannon Culver, ALFREDOP-C  Hematologic Malignancies, Stem Cell Transplantation, and Cellular Therapy  [facility]

## 2025-05-20 ENCOUNTER — LAB VISIT (OUTPATIENT)
Dept: LAB | Facility: HOSPITAL | Age: 60
End: 2025-05-20
Attending: INTERNAL MEDICINE
Payer: MEDICARE

## 2025-05-20 DIAGNOSIS — C90.00 MULTIPLE MYELOMA NOT HAVING ACHIEVED REMISSION: ICD-10-CM

## 2025-05-20 LAB
ABSOLUTE EOSINOPHIL (OHS): 0 K/UL
ABSOLUTE MONOCYTE (OHS): 0.59 K/UL (ref 0.3–1)
ABSOLUTE NEUTROPHIL COUNT (OHS): 1.11 K/UL (ref 1.8–7.7)
ALBUMIN SERPL BCP-MCNC: 2.9 G/DL (ref 3.5–5.2)
ALP SERPL-CCNC: 69 UNIT/L (ref 40–150)
ALT SERPL W/O P-5'-P-CCNC: 22 UNIT/L (ref 10–44)
ANION GAP (OHS): 11 MMOL/L (ref 8–16)
AST SERPL-CCNC: 15 UNIT/L (ref 11–45)
BASOPHILS # BLD AUTO: 0.01 K/UL
BASOPHILS NFR BLD AUTO: 0.3 %
BILIRUB SERPL-MCNC: 0.3 MG/DL (ref 0.1–1)
BUN SERPL-MCNC: 13 MG/DL (ref 6–20)
CALCIUM SERPL-MCNC: 8.5 MG/DL (ref 8.7–10.5)
CHLORIDE SERPL-SCNC: 108 MMOL/L (ref 95–110)
CO2 SERPL-SCNC: 23 MMOL/L (ref 23–29)
CREAT SERPL-MCNC: 1 MG/DL (ref 0.5–1.4)
ERYTHROCYTE [DISTWIDTH] IN BLOOD BY AUTOMATED COUNT: 16.7 % (ref 11.5–14.5)
GFR SERPLBLD CREATININE-BSD FMLA CKD-EPI: >60 ML/MIN/1.73/M2
GLUCOSE SERPL-MCNC: 96 MG/DL (ref 70–110)
HCT VFR BLD AUTO: 33.3 % (ref 37–48.5)
HGB BLD-MCNC: 11.1 GM/DL (ref 12–16)
IGA SERPL-MCNC: 47 MG/DL (ref 40–350)
IGG SERPL-MCNC: 789 MG/DL (ref 650–1600)
IGM SERPL-MCNC: 28 MG/DL (ref 50–300)
IMM GRANULOCYTES # BLD AUTO: 0 K/UL (ref 0–0.04)
IMM GRANULOCYTES NFR BLD AUTO: 0 % (ref 0–0.5)
INDIRECT COOMBS: NORMAL
LYMPHOCYTES # BLD AUTO: 1.34 K/UL (ref 1–4.8)
MCH RBC QN AUTO: 29.5 PG (ref 27–31)
MCHC RBC AUTO-ENTMCNC: 33.3 G/DL (ref 32–36)
MCV RBC AUTO: 89 FL (ref 82–98)
NUCLEATED RBC (/100WBC) (OHS): 0 /100 WBC
PLATELET # BLD AUTO: 174 K/UL (ref 150–450)
PMV BLD AUTO: ABNORMAL FL
POTASSIUM SERPL-SCNC: 3.2 MMOL/L (ref 3.5–5.1)
PROT SERPL-MCNC: 6 GM/DL (ref 6–8.4)
RBC # BLD AUTO: 3.76 M/UL (ref 4–5.4)
RELATIVE EOSINOPHIL (OHS): 0 %
RELATIVE LYMPHOCYTE (OHS): 43.9 % (ref 18–48)
RELATIVE MONOCYTE (OHS): 19.3 % (ref 4–15)
RELATIVE NEUTROPHIL (OHS): 36.5 % (ref 38–73)
RH BLD: NORMAL
SODIUM SERPL-SCNC: 142 MMOL/L (ref 136–145)
SPECIMEN OUTDATE: NORMAL
WBC # BLD AUTO: 3.05 K/UL (ref 3.9–12.7)

## 2025-05-20 PROCEDURE — 36415 COLL VENOUS BLD VENIPUNCTURE: CPT | Mod: HCNC

## 2025-05-20 PROCEDURE — 84165 PROTEIN E-PHORESIS SERUM: CPT | Mod: HCNC

## 2025-05-20 PROCEDURE — 82784 ASSAY IGA/IGD/IGG/IGM EACH: CPT | Mod: HCNC

## 2025-05-20 PROCEDURE — 86900 BLOOD TYPING SEROLOGIC ABO: CPT | Mod: HCNC

## 2025-05-20 PROCEDURE — 83521 IG LIGHT CHAINS FREE EACH: CPT | Mod: HCNC

## 2025-05-20 PROCEDURE — 85025 COMPLETE CBC W/AUTO DIFF WBC: CPT | Mod: HCNC

## 2025-05-20 PROCEDURE — 86334 IMMUNOFIX E-PHORESIS SERUM: CPT | Mod: HCNC

## 2025-05-20 PROCEDURE — 84155 ASSAY OF PROTEIN SERUM: CPT | Mod: HCNC

## 2025-05-21 ENCOUNTER — OFFICE VISIT (OUTPATIENT)
Dept: HEMATOLOGY/ONCOLOGY | Facility: CLINIC | Age: 60
End: 2025-05-21
Payer: MEDICARE

## 2025-05-21 VITALS
BODY MASS INDEX: 48.05 KG/M2 | HEART RATE: 61 BPM | WEIGHT: 281.44 LBS | HEIGHT: 64 IN | SYSTOLIC BLOOD PRESSURE: 148 MMHG | TEMPERATURE: 98 F | OXYGEN SATURATION: 99 % | DIASTOLIC BLOOD PRESSURE: 83 MMHG

## 2025-05-21 DIAGNOSIS — T45.1X5A LEUKOPENIA DUE TO ANTINEOPLASTIC CHEMOTHERAPY: ICD-10-CM

## 2025-05-21 DIAGNOSIS — D70.1 LEUKOPENIA DUE TO ANTINEOPLASTIC CHEMOTHERAPY: ICD-10-CM

## 2025-05-21 DIAGNOSIS — D84.821 IMMUNODEFICIENCY DUE TO DRUGS: ICD-10-CM

## 2025-05-21 DIAGNOSIS — D63.0 ANEMIA IN NEOPLASTIC DISEASE: ICD-10-CM

## 2025-05-21 DIAGNOSIS — E87.6 HYPOKALEMIA: ICD-10-CM

## 2025-05-21 DIAGNOSIS — D68.69 OTHER THROMBOPHILIA: ICD-10-CM

## 2025-05-21 DIAGNOSIS — R43.2 ALTERED TASTE: ICD-10-CM

## 2025-05-21 DIAGNOSIS — C90.00 MULTIPLE MYELOMA NOT HAVING ACHIEVED REMISSION: Primary | ICD-10-CM

## 2025-05-21 DIAGNOSIS — E55.9 VITAMIN D DEFICIENCY: ICD-10-CM

## 2025-05-21 DIAGNOSIS — Z79.899 IMMUNODEFICIENCY DUE TO DRUGS: ICD-10-CM

## 2025-05-21 LAB
ALBUMIN, SPE (OHS): 3.05 G/DL (ref 3.35–5.55)
ALPHA 1 GLOB (OHS): 0.4 GM/DL (ref 0.17–0.41)
ALPHA 2 GLOB (OHS): 0.75 GM/DL (ref 0.43–0.99)
BETA GLOB (OHS): 0.68 GM/DL (ref 0.5–1.1)
GAMMA GLOBULIN (OHS): 0.62 GM/DL (ref 0.67–1.58)
KAPPA LC FREE SER-MCNC: 1.35 MG/L (ref 0.26–1.65)
KAPPA LC FREE/LAMBDA FREE SER: 1.28 MG/DL (ref 0.33–1.94)
LAMBDA LC FREE SERPL-MCNC: 0.95 MG/DL (ref 0.57–2.63)
PROT SERPL-MCNC: 5.5 GM/DL (ref 6–8.4)

## 2025-05-21 PROCEDURE — 99999 PR PBB SHADOW E&M-EST. PATIENT-LVL V: CPT | Mod: PBBFAC,HCNC,,

## 2025-05-21 RX ORDER — HEPARIN 100 UNIT/ML
500 SYRINGE INTRAVENOUS
Status: CANCELLED | OUTPATIENT
Start: 2025-05-22

## 2025-05-21 RX ORDER — PROCHLORPERAZINE EDISYLATE 5 MG/ML
10 INJECTION INTRAMUSCULAR; INTRAVENOUS ONCE AS NEEDED
OUTPATIENT
Start: 2025-06-12

## 2025-05-21 RX ORDER — HEPARIN 100 UNIT/ML
500 SYRINGE INTRAVENOUS
OUTPATIENT
Start: 2025-06-12

## 2025-05-21 RX ORDER — DIPHENHYDRAMINE HYDROCHLORIDE 50 MG/ML
50 INJECTION, SOLUTION INTRAMUSCULAR; INTRAVENOUS ONCE AS NEEDED
Status: CANCELLED | OUTPATIENT
Start: 2025-05-22

## 2025-05-21 RX ORDER — EPINEPHRINE 0.3 MG/.3ML
0.3 INJECTION SUBCUTANEOUS ONCE AS NEEDED
Status: CANCELLED | OUTPATIENT
Start: 2025-05-22

## 2025-05-21 RX ORDER — EPINEPHRINE 0.3 MG/.3ML
0.3 INJECTION SUBCUTANEOUS ONCE AS NEEDED
OUTPATIENT
Start: 2025-06-12

## 2025-05-21 RX ORDER — EPINEPHRINE 0.3 MG/.3ML
0.3 INJECTION SUBCUTANEOUS ONCE AS NEEDED
OUTPATIENT
Start: 2025-05-29

## 2025-05-21 RX ORDER — BORTEZOMIB 3.5 MG/1
1.3 INJECTION, POWDER, LYOPHILIZED, FOR SOLUTION INTRAVENOUS; SUBCUTANEOUS
OUTPATIENT
Start: 2025-05-29

## 2025-05-21 RX ORDER — BORTEZOMIB 3.5 MG/1
1.3 INJECTION, POWDER, LYOPHILIZED, FOR SOLUTION INTRAVENOUS; SUBCUTANEOUS
Status: CANCELLED | OUTPATIENT
Start: 2025-05-22

## 2025-05-21 RX ORDER — BORTEZOMIB 3.5 MG/1
1.3 INJECTION, POWDER, LYOPHILIZED, FOR SOLUTION INTRAVENOUS; SUBCUTANEOUS
OUTPATIENT
Start: 2025-06-05

## 2025-05-21 RX ORDER — DIPHENHYDRAMINE HYDROCHLORIDE 50 MG/ML
50 INJECTION, SOLUTION INTRAMUSCULAR; INTRAVENOUS ONCE AS NEEDED
OUTPATIENT
Start: 2025-06-05

## 2025-05-21 RX ORDER — SODIUM CHLORIDE 0.9 % (FLUSH) 0.9 %
10 SYRINGE (ML) INJECTION
OUTPATIENT
Start: 2025-06-12

## 2025-05-21 RX ORDER — PROCHLORPERAZINE EDISYLATE 5 MG/ML
10 INJECTION INTRAMUSCULAR; INTRAVENOUS ONCE AS NEEDED
OUTPATIENT
Start: 2025-05-29

## 2025-05-21 RX ORDER — PROCHLORPERAZINE EDISYLATE 5 MG/ML
10 INJECTION INTRAMUSCULAR; INTRAVENOUS ONCE AS NEEDED
Status: CANCELLED | OUTPATIENT
Start: 2025-05-22

## 2025-05-21 RX ORDER — SODIUM CHLORIDE 0.9 % (FLUSH) 0.9 %
10 SYRINGE (ML) INJECTION
OUTPATIENT
Start: 2025-06-05

## 2025-05-21 RX ORDER — SODIUM CHLORIDE 0.9 % (FLUSH) 0.9 %
10 SYRINGE (ML) INJECTION
Status: CANCELLED | OUTPATIENT
Start: 2025-05-22

## 2025-05-21 RX ORDER — HEPARIN 100 UNIT/ML
500 SYRINGE INTRAVENOUS
OUTPATIENT
Start: 2025-05-29

## 2025-05-21 RX ORDER — HEPARIN 100 UNIT/ML
500 SYRINGE INTRAVENOUS
OUTPATIENT
Start: 2025-06-05

## 2025-05-21 RX ORDER — ARIPIPRAZOLE 15 MG/1
15 TABLET ORAL
COMMUNITY
Start: 2025-04-01

## 2025-05-21 RX ORDER — SODIUM CHLORIDE 0.9 % (FLUSH) 0.9 %
10 SYRINGE (ML) INJECTION
OUTPATIENT
Start: 2025-05-29

## 2025-05-21 RX ORDER — BORTEZOMIB 3.5 MG/1
1.3 INJECTION, POWDER, LYOPHILIZED, FOR SOLUTION INTRAVENOUS; SUBCUTANEOUS
OUTPATIENT
Start: 2025-06-12

## 2025-05-21 RX ORDER — POTASSIUM CHLORIDE 20 MEQ/1
20 TABLET, EXTENDED RELEASE ORAL 2 TIMES DAILY
Qty: 8 TABLET | Refills: 0 | Status: SHIPPED | OUTPATIENT
Start: 2025-05-21

## 2025-05-21 RX ORDER — DIPHENHYDRAMINE HYDROCHLORIDE 50 MG/ML
50 INJECTION, SOLUTION INTRAMUSCULAR; INTRAVENOUS ONCE AS NEEDED
OUTPATIENT
Start: 2025-06-12

## 2025-05-21 RX ORDER — DIPHENHYDRAMINE HYDROCHLORIDE 50 MG/ML
50 INJECTION, SOLUTION INTRAMUSCULAR; INTRAVENOUS ONCE AS NEEDED
OUTPATIENT
Start: 2025-05-29

## 2025-05-21 RX ORDER — TIZANIDINE 4 MG/1
TABLET ORAL 2 TIMES DAILY
COMMUNITY
Start: 2025-05-01

## 2025-05-21 RX ORDER — EPINEPHRINE 0.3 MG/.3ML
0.3 INJECTION SUBCUTANEOUS ONCE AS NEEDED
OUTPATIENT
Start: 2025-06-05

## 2025-05-21 RX ORDER — PROCHLORPERAZINE EDISYLATE 5 MG/ML
10 INJECTION INTRAMUSCULAR; INTRAVENOUS ONCE AS NEEDED
OUTPATIENT
Start: 2025-06-05

## 2025-05-22 ENCOUNTER — INFUSION (OUTPATIENT)
Dept: INFUSION THERAPY | Facility: HOSPITAL | Age: 60
End: 2025-05-22
Payer: MEDICARE

## 2025-05-22 VITALS
HEART RATE: 53 BPM | TEMPERATURE: 98 F | SYSTOLIC BLOOD PRESSURE: 168 MMHG | HEIGHT: 64 IN | RESPIRATION RATE: 18 BRPM | DIASTOLIC BLOOD PRESSURE: 76 MMHG | BODY MASS INDEX: 48.05 KG/M2 | OXYGEN SATURATION: 100 % | WEIGHT: 281.44 LBS

## 2025-05-22 DIAGNOSIS — C90.00 MULTIPLE MYELOMA NOT HAVING ACHIEVED REMISSION: Primary | ICD-10-CM

## 2025-05-22 PROCEDURE — 96401 CHEMO ANTI-NEOPL SQ/IM: CPT | Mod: HCNC

## 2025-05-22 PROCEDURE — 63600175 PHARM REV CODE 636 W HCPCS: Mod: HCNC

## 2025-05-22 RX ORDER — BORTEZOMIB 3.5 MG/1
1.3 INJECTION, POWDER, LYOPHILIZED, FOR SOLUTION INTRAVENOUS; SUBCUTANEOUS
Status: COMPLETED | OUTPATIENT
Start: 2025-05-22 | End: 2025-05-22

## 2025-05-22 RX ORDER — EPINEPHRINE 0.3 MG/.3ML
0.3 INJECTION SUBCUTANEOUS ONCE AS NEEDED
Status: DISCONTINUED | OUTPATIENT
Start: 2025-05-22 | End: 2025-05-22 | Stop reason: HOSPADM

## 2025-05-22 RX ORDER — DIPHENHYDRAMINE HYDROCHLORIDE 50 MG/ML
50 INJECTION, SOLUTION INTRAMUSCULAR; INTRAVENOUS ONCE AS NEEDED
Status: DISCONTINUED | OUTPATIENT
Start: 2025-05-22 | End: 2025-05-22 | Stop reason: HOSPADM

## 2025-05-22 RX ADMIN — DARATUMUMAB AND HYALURONIDASE-FIHJ (HUMAN RECOMBINANT) 1800 MG: 1800; 30000 INJECTION SUBCUTANEOUS at 11:05

## 2025-05-22 RX ADMIN — BORTEZOMIB 3.25 MG: 3.5 INJECTION, POWDER, LYOPHILIZED, FOR SOLUTION INTRAVENOUS; SUBCUTANEOUS at 11:05

## 2025-05-23 ENCOUNTER — RESULTS FOLLOW-UP (OUTPATIENT)
Dept: HEMATOLOGY/ONCOLOGY | Facility: CLINIC | Age: 60
End: 2025-05-23

## 2025-05-23 ENCOUNTER — TELEPHONE (OUTPATIENT)
Dept: NEUROLOGY | Facility: CLINIC | Age: 60
End: 2025-05-23
Payer: MEDICARE

## 2025-05-23 LAB
PATHOLOGIST INTERPRETATION - IFE SERUM (OHS): NORMAL
PATHOLOGIST REVIEW - SPE (OHS): NORMAL

## 2025-05-23 NOTE — TELEPHONE ENCOUNTER
Called pt at 7:50am to confirm they are coming to their botox appt today. Pt confirmed yes they are coming

## 2025-05-29 ENCOUNTER — INFUSION (OUTPATIENT)
Dept: INFUSION THERAPY | Facility: HOSPITAL | Age: 60
End: 2025-05-29
Payer: MEDICARE

## 2025-05-29 VITALS
BODY MASS INDEX: 47.68 KG/M2 | SYSTOLIC BLOOD PRESSURE: 136 MMHG | TEMPERATURE: 98 F | HEART RATE: 95 BPM | WEIGHT: 279.31 LBS | RESPIRATION RATE: 16 BRPM | OXYGEN SATURATION: 96 % | HEIGHT: 64 IN | DIASTOLIC BLOOD PRESSURE: 70 MMHG

## 2025-05-29 DIAGNOSIS — C90.00 MULTIPLE MYELOMA NOT HAVING ACHIEVED REMISSION: Primary | ICD-10-CM

## 2025-05-29 PROCEDURE — 96401 CHEMO ANTI-NEOPL SQ/IM: CPT

## 2025-05-29 PROCEDURE — 63600175 PHARM REV CODE 636 W HCPCS

## 2025-05-29 RX ORDER — EPINEPHRINE 0.3 MG/.3ML
0.3 INJECTION SUBCUTANEOUS ONCE AS NEEDED
Status: DISCONTINUED | OUTPATIENT
Start: 2025-05-29 | End: 2025-05-29 | Stop reason: HOSPADM

## 2025-05-29 RX ORDER — BORTEZOMIB 3.5 MG/1
1.3 INJECTION, POWDER, LYOPHILIZED, FOR SOLUTION INTRAVENOUS; SUBCUTANEOUS
Status: COMPLETED | OUTPATIENT
Start: 2025-05-29 | End: 2025-05-29

## 2025-05-29 RX ORDER — DIPHENHYDRAMINE HYDROCHLORIDE 50 MG/ML
50 INJECTION, SOLUTION INTRAMUSCULAR; INTRAVENOUS ONCE AS NEEDED
Status: DISCONTINUED | OUTPATIENT
Start: 2025-05-29 | End: 2025-05-29 | Stop reason: HOSPADM

## 2025-05-29 RX ADMIN — BORTEZOMIB 3.25 MG: 3.5 INJECTION, POWDER, LYOPHILIZED, FOR SOLUTION INTRAVENOUS; SUBCUTANEOUS at 09:05

## 2025-05-30 DIAGNOSIS — C90.00 MULTIPLE MYELOMA NOT HAVING ACHIEVED REMISSION: Primary | ICD-10-CM

## 2025-05-30 DIAGNOSIS — D64.89 ANEMIA DUE TO OTHER CAUSE, NOT CLASSIFIED: ICD-10-CM

## 2025-05-31 ENCOUNTER — EXTERNAL CHRONIC CARE MANAGEMENT (OUTPATIENT)
Dept: PRIMARY CARE CLINIC | Facility: CLINIC | Age: 60
End: 2025-05-31
Payer: MEDICARE

## 2025-05-31 PROCEDURE — 99490 CHRNC CARE MGMT STAFF 1ST 20: CPT | Mod: S$GLB,,, | Performed by: INTERNAL MEDICINE

## 2025-06-05 ENCOUNTER — INFUSION (OUTPATIENT)
Dept: INFUSION THERAPY | Facility: HOSPITAL | Age: 60
End: 2025-06-05
Payer: MEDICARE

## 2025-06-05 VITALS
SYSTOLIC BLOOD PRESSURE: 150 MMHG | DIASTOLIC BLOOD PRESSURE: 70 MMHG | RESPIRATION RATE: 18 BRPM | OXYGEN SATURATION: 100 % | HEIGHT: 64 IN | TEMPERATURE: 98 F | WEIGHT: 282.44 LBS | BODY MASS INDEX: 48.22 KG/M2 | HEART RATE: 61 BPM

## 2025-06-05 DIAGNOSIS — C90.00 MULTIPLE MYELOMA NOT HAVING ACHIEVED REMISSION: Primary | ICD-10-CM

## 2025-06-05 DIAGNOSIS — C90.00 MULTIPLE MYELOMA NOT HAVING ACHIEVED REMISSION: ICD-10-CM

## 2025-06-05 PROCEDURE — 96401 CHEMO ANTI-NEOPL SQ/IM: CPT

## 2025-06-05 PROCEDURE — 63600175 PHARM REV CODE 636 W HCPCS: Mod: JZ,TB

## 2025-06-05 RX ORDER — PROCHLORPERAZINE EDISYLATE 5 MG/ML
10 INJECTION INTRAMUSCULAR; INTRAVENOUS ONCE AS NEEDED
Status: DISCONTINUED | OUTPATIENT
Start: 2025-06-05 | End: 2025-06-05 | Stop reason: HOSPADM

## 2025-06-05 RX ORDER — LENALIDOMIDE 25 MG/1
CAPSULE ORAL
Qty: 21 CAPSULE | Refills: 0 | Status: SHIPPED | OUTPATIENT
Start: 2025-06-05 | End: 2026-06-05

## 2025-06-05 RX ORDER — DIPHENHYDRAMINE HYDROCHLORIDE 50 MG/ML
50 INJECTION, SOLUTION INTRAMUSCULAR; INTRAVENOUS ONCE AS NEEDED
Status: DISCONTINUED | OUTPATIENT
Start: 2025-06-05 | End: 2025-06-05 | Stop reason: HOSPADM

## 2025-06-05 RX ORDER — EPINEPHRINE 0.3 MG/.3ML
0.3 INJECTION SUBCUTANEOUS ONCE AS NEEDED
Status: DISCONTINUED | OUTPATIENT
Start: 2025-06-05 | End: 2025-06-05 | Stop reason: HOSPADM

## 2025-06-05 RX ORDER — BORTEZOMIB 3.5 MG/1
1.3 INJECTION, POWDER, LYOPHILIZED, FOR SOLUTION INTRAVENOUS; SUBCUTANEOUS
Status: COMPLETED | OUTPATIENT
Start: 2025-06-05 | End: 2025-06-05

## 2025-06-05 RX ADMIN — DARATUMUMAB AND HYALURONIDASE-FIHJ (HUMAN RECOMBINANT) 1800 MG: 1800; 30000 INJECTION SUBCUTANEOUS at 09:06

## 2025-06-05 RX ADMIN — BORTEZOMIB 3.25 MG: 3.5 INJECTION, POWDER, LYOPHILIZED, FOR SOLUTION INTRAVENOUS; SUBCUTANEOUS at 09:06

## 2025-06-12 ENCOUNTER — INFUSION (OUTPATIENT)
Dept: INFUSION THERAPY | Facility: HOSPITAL | Age: 60
End: 2025-06-12
Payer: MEDICARE

## 2025-06-12 VITALS
DIASTOLIC BLOOD PRESSURE: 65 MMHG | HEART RATE: 76 BPM | OXYGEN SATURATION: 95 % | RESPIRATION RATE: 16 BRPM | SYSTOLIC BLOOD PRESSURE: 139 MMHG | TEMPERATURE: 98 F

## 2025-06-12 DIAGNOSIS — C90.00 MULTIPLE MYELOMA NOT HAVING ACHIEVED REMISSION: Primary | ICD-10-CM

## 2025-06-12 PROCEDURE — 63600175 PHARM REV CODE 636 W HCPCS

## 2025-06-12 PROCEDURE — 96401 CHEMO ANTI-NEOPL SQ/IM: CPT

## 2025-06-12 RX ORDER — PROCHLORPERAZINE EDISYLATE 5 MG/ML
10 INJECTION INTRAMUSCULAR; INTRAVENOUS ONCE AS NEEDED
Status: DISCONTINUED | OUTPATIENT
Start: 2025-06-12 | End: 2025-06-12 | Stop reason: HOSPADM

## 2025-06-12 RX ORDER — HEPARIN 100 UNIT/ML
500 SYRINGE INTRAVENOUS
Status: DISCONTINUED | OUTPATIENT
Start: 2025-06-12 | End: 2025-06-12 | Stop reason: HOSPADM

## 2025-06-12 RX ORDER — DIPHENHYDRAMINE HYDROCHLORIDE 50 MG/ML
50 INJECTION, SOLUTION INTRAMUSCULAR; INTRAVENOUS ONCE AS NEEDED
Status: DISCONTINUED | OUTPATIENT
Start: 2025-06-12 | End: 2025-06-12 | Stop reason: HOSPADM

## 2025-06-12 RX ORDER — BORTEZOMIB 3.5 MG/1
1.3 INJECTION, POWDER, LYOPHILIZED, FOR SOLUTION INTRAVENOUS; SUBCUTANEOUS
Status: COMPLETED | OUTPATIENT
Start: 2025-06-12 | End: 2025-06-12

## 2025-06-12 RX ORDER — EPINEPHRINE 0.3 MG/.3ML
0.3 INJECTION SUBCUTANEOUS ONCE AS NEEDED
Status: DISCONTINUED | OUTPATIENT
Start: 2025-06-12 | End: 2025-06-12 | Stop reason: HOSPADM

## 2025-06-12 RX ORDER — SODIUM CHLORIDE 0.9 % (FLUSH) 0.9 %
10 SYRINGE (ML) INJECTION
Status: DISCONTINUED | OUTPATIENT
Start: 2025-06-12 | End: 2025-06-12 | Stop reason: HOSPADM

## 2025-06-12 RX ADMIN — BORTEZOMIB 3.25 MG: 3.5 INJECTION, POWDER, LYOPHILIZED, FOR SOLUTION INTRAVENOUS; SUBCUTANEOUS at 10:06

## 2025-06-12 NOTE — NURSING
Patient tolerated C3D22 Velcade today to ABD tissue. RTC in 1 week. Discharged home, ambulated independently.

## 2025-06-17 ENCOUNTER — HOSPITAL ENCOUNTER (OUTPATIENT)
Dept: RADIOLOGY | Facility: HOSPITAL | Age: 60
Discharge: HOME | End: 2025-06-17
Attending: NURSE PRACTITIONER
Payer: MEDICARE

## 2025-06-17 ENCOUNTER — CLINICAL SUPPORT (OUTPATIENT)
Dept: INTERNAL MEDICINE | Facility: CLINIC | Age: 60
End: 2025-06-17
Payer: MEDICARE

## 2025-06-17 ENCOUNTER — TELEPHONE (OUTPATIENT)
Dept: INTERNAL MEDICINE | Facility: CLINIC | Age: 60
End: 2025-06-17
Payer: MEDICARE

## 2025-06-17 DIAGNOSIS — B96.89 BACTERIAL UPPER RESPIRATORY INFECTION: ICD-10-CM

## 2025-06-17 DIAGNOSIS — E87.6 HYPOKALEMIA: ICD-10-CM

## 2025-06-17 DIAGNOSIS — R05.9 COUGH, UNSPECIFIED TYPE: ICD-10-CM

## 2025-06-17 DIAGNOSIS — R05.9 COUGH, UNSPECIFIED TYPE: Primary | ICD-10-CM

## 2025-06-17 DIAGNOSIS — J06.9 BACTERIAL UPPER RESPIRATORY INFECTION: ICD-10-CM

## 2025-06-17 PROCEDURE — 71046 X-RAY EXAM CHEST 2 VIEWS: CPT | Mod: 26,HCNC,, | Performed by: RADIOLOGY

## 2025-06-17 PROCEDURE — 71046 X-RAY EXAM CHEST 2 VIEWS: CPT | Mod: TC,HCNC

## 2025-06-17 RX ORDER — BENZONATATE 100 MG/1
100 CAPSULE ORAL 3 TIMES DAILY PRN
Qty: 30 CAPSULE | Refills: 0 | Status: SHIPPED | OUTPATIENT
Start: 2025-06-17 | End: 2025-06-27

## 2025-06-17 RX ORDER — GUAIFENESIN 600 MG/1
600 TABLET, EXTENDED RELEASE ORAL 2 TIMES DAILY
Qty: 20 TABLET | Refills: 0 | Status: SHIPPED | OUTPATIENT
Start: 2025-06-17 | End: 2025-06-27

## 2025-06-17 RX ORDER — AZITHROMYCIN 250 MG/1
TABLET, FILM COATED ORAL
Qty: 6 TABLET | Refills: 0 | Status: SHIPPED | OUTPATIENT
Start: 2025-06-17 | End: 2025-06-22

## 2025-06-17 RX ORDER — POTASSIUM CHLORIDE 20 MEQ/1
20 TABLET, EXTENDED RELEASE ORAL DAILY
Qty: 30 TABLET | Refills: 2 | Status: SHIPPED | OUTPATIENT
Start: 2025-06-17 | End: 2025-06-19 | Stop reason: SDUPTHER

## 2025-06-17 NOTE — TELEPHONE ENCOUNTER
Spoke with pt. She's been having voice hoarseness, cough with yellow sputum and low grade temps (t-max 100.) x 5 days. Her grand kids were sick recently. Both negative for covid and flu. Taking tylenol only for her symptoms. Denies any cp or sob. Had labs done this AM. Cbc stable. She is on chemo for MM.   Want her to get CXR and swab for covid just in case. Start azithromycin for now. Plain mucinex sent. Tessalon prn cough.   She has f/u with her oncologist hayley. Discussed if she worsens or temps 100.4 or higher, needs to go to ER.   She is hydrating well. No nv.     Alyse, can you schedule her today for CXR? I would also like her to  be put on nurse schedule for covid test. Thanks!

## 2025-06-18 ENCOUNTER — RESULTS FOLLOW-UP (OUTPATIENT)
Dept: INTERNAL MEDICINE | Facility: CLINIC | Age: 60
End: 2025-06-18

## 2025-06-18 NOTE — PROGRESS NOTES
Section of Hematology and Stem Cell Transplantation  Follow-Up Note     06/19/2025  Primary Oncologic Diagnosis: No primary diagnosis found.    History of Present Ilness:   Amna Vogt) is a pleasant 59 y.o.female from [place], [place], here for follow up of newly diagnosed myeloma. Her co-morbidities include CHB c/b cardiac arrest s/p CRT-D, AFib on Xarelto, HFrEF with improved EF (LVEF 50-55%), prior CVA, HTN, depression, SHIRA on CPAP.     Oncologic History:   She was hospitalized from 1/29 - 2/02 with left upper quadrant pain and was found to have small bowel enteritis. She improved with antibiotics and symptomatic care. During hospitalization, myeloma labs were ordered to assess elevated protein gap. She was found to have IgG lambda with M protein of 3.43 g/dL and subsequently referred to Hematology clinic. Since hospitalization, she has been feeling overall well. She denies fevers, night sweats, weight loss, bone pain.   2/2/2025: Elevated M protein noted 3.43 g/dL with ANALI showing IgG lambda, normal FLC, mild anemia  Pathology:  Bone marrow biopsy  done on 3/10/25. 60% clonal plasma cells. FISH pending.  4/2/24: PET CT with no hypermetabolic lesions concerning for active disease.  3/27/25: initiated lion-VRd    Interval History 06/19/2025:  Patient here for follow up prior to cycle 4 of lion-VRd. She reports that she has had a terrible cold, was since in primary care with a negative chest x-ray, she is on arythromycin since Tuesday. She reports low-grade fever, not above our 100.4 threshold, chest congestion, cough, phlegm that is yellow in nature. As a result, we will hold treatment today for her recovery. She has mild, manageable diarrhea a couple times per month. Her altered taste is still present, and she is working to eat anyway. No other change in symptoms.    Past Medical History, Social History, and Past Family History are unchanged since last evaluation except for HPI.    Past Medical  History:   Diagnosis Date    Anticoagulant long-term use     Anxiety     Asthma     Atrial fibrillation     Brain anoxic injury     Cervicalgia 8/28/2014    CHI (closed head injury) 2/19/2013    Convulsion 5/30/2015    Decreased ROM of left shoulder 4/12/2017    Defibrillator activation 2013    Depression     Heart block     History of sudden cardiac arrest 2/2013    PEA arrest with subsequent long-QT    Hx of psychiatric care     Hypertension     Left atrial enlargement 4/11/2018    Pacemaker 2013    Paresthesia 11/1/2013    Prolonged Q-T interval on ECG 2/8/2013    Psychiatric problem     Seizures     Sleep difficulties     Stroke     weakness lt side    Therapy     Thyroid disease     Upper airway resistance syndrome 2/21/2017        CURRENT MEDICATIONS:   Current Outpatient Medications   Medication Sig    acyclovir (ZOVIRAX) 400 MG tablet Take 1 tablet (400 mg total) by mouth 2 (two) times daily.    amLODIPine (NORVASC) 5 MG tablet Take 5 mg by mouth.    ARIPiprazole (ABILIFY) 15 MG Tab Take 15 mg by mouth.    azithromycin (Z-ROMY) 250 MG tablet Take 2 tablets by mouth on day 1; Take 1 tablet by mouth on days 2-5    benzonatate (TESSALON) 100 MG capsule Take 1 capsule (100 mg total) by mouth 3 (three) times daily as needed.    carvediloL (COREG) 12.5 MG tablet Take 1 tablet (12.5 mg total) by mouth 2 (two) times daily.    clonazePAM (KLONOPIN) 1 MG tablet TAKE 1 TABLET BY MOUTH DAILY AS NEEDED FOR ANXIETY    dexAMETHasone (DECADRON) 4 MG Tab Take 10 tablets (40 mg total) by mouth every 7 days. Take with food.    dicyclomine (BENTYL) 10 MG capsule TAKE 1 CAPSULE(10 MG) BY MOUTH TWICE DAILY AS NEEDED FOR ABDOMINAL CRAMPS    ergocalciferol (ERGOCALCIFEROL) 50,000 unit Cap Take 1 capsule (50,000 Units total) by mouth every 7 days.    furosemide (LASIX) 20 MG tablet Take 1 tablet (20 mg total) by mouth daily as needed. Take as needed for weight gain (2-3 lbs/day or 5 lbs/week), shortness of breath, or leg swelling     guaiFENesin (MUCINEX) 600 mg 12 hr tablet Take 1 tablet (600 mg total) by mouth 2 (two) times daily. for 10 days    lenalidomide 25 mg Cap Take 1 capsule by mouth on days 1-21 of a 28 day cycle. [facility] [id number] 6/5/25.    losartan (COZAAR) 100 MG tablet Take 1 tablet (100 mg total) by mouth once daily.    omeprazole (PRILOSEC) 40 MG capsule Take 1 capsule (40 mg total) by mouth once daily. Take 30 minutes before breakfast    ondansetron (ZOFRAN-ODT) 4 MG TbDL Dissolve 2 tablets (8 mg total) by mouth every 6 (six) hours as needed.    polyethylene glycol (GLYCOLAX) 17 gram PwPk Take 17 g by mouth once daily.    prochlorperazine (COMPAZINE) 5 MG tablet Take 2 tablets (10 mg total) by mouth every 6 (six) hours as needed for Nausea.    rivaroxaban (XARELTO) 20 mg Tab Take 1 tablet (20 mg total) by mouth daily with dinner or evening meal.    rosuvastatin (CRESTOR) 40 MG Tab Take 1 tablet (40 mg total) by mouth every evening.    tiaGABine (GABITRIL) 4 MG tablet Take 1 tablet (4 mg total) by mouth every evening.    tiZANidine (ZANAFLEX) 4 MG tablet Take by mouth 2 (two) times daily.    zolpidem (AMBIEN) 10 mg Tab Take 1 tablet (10 mg total) by mouth nightly as needed (insomnia).    EPINEPHrine (EPIPEN) 0.3 mg/0.3 mL AtIn Inject 0.3 mLs (0.3 mg total) into the muscle once. for 1 dose    potassium chloride SA (K-DUR,KLOR-CON) 20 MEQ tablet Take 1 tablet (20 mEq total) by mouth once daily.     Current Facility-Administered Medications   Medication    cyanocobalamin tablet 100 mcg    onabotulinumtoxina injection 200 Units    onabotulinumtoxina injection 200 Units    onabotulinumtoxina injection 200 Units    onabotulinumtoxina injection 200 Units    onabotulinumtoxina injection 200 Units    onabotulinumtoxina injection 200 Units    onabotulinumtoxina injection 200 Units    onabotulinumtoxina injection 200 Units     Facility-Administered Medications Ordered in Other Visits   Medication    lactated ringers infusion    lidocaine (PF)  10 mg/ml (1%) injection 5 mg       ALLERGIES:   Review of patient's allergies indicates:   Allergen Reactions    Aspirin Hives    Imitrex [sumatriptan] Palpitations    Penicillins Hives and Swelling    Shellfish containing products Anaphylaxis     seafood    Reglan [metoclopramide hcl] Other (See Comments)     Parkinsonism        Review of Systems:     Review of Systems   Constitutional:  Negative for chills, fever, malaise/fatigue and weight loss.   HENT:  Negative for congestion, nosebleeds, sinus pain and sore throat.    Eyes:  Negative for blurred vision, double vision, photophobia and pain.   Respiratory:  Negative for cough and shortness of breath.    Cardiovascular:  Negative for chest pain and palpitations.   Gastrointestinal:  Negative for constipation, diarrhea, heartburn, nausea and vomiting.   Genitourinary:  Negative for dysuria and hematuria.   Musculoskeletal:  Negative for back pain and falls.   Skin:  Negative for rash.   Neurological:  Negative for dizziness, sensory change, weakness and headaches.   Endo/Heme/Allergies:  Negative for environmental allergies.   Psychiatric/Behavioral:  The patient does not have insomnia.        Physical Exam:     Vitals:    06/19/25 1003   BP: 134/63   Pulse: 60   Resp: 18   Temp: 97.8 °F (36.6 °C)         Physical Exam  Vitals reviewed.   Constitutional:       General: She is not in acute distress.     Appearance: Normal appearance. She is obese. She is not ill-appearing.   HENT:      Head: Normocephalic and atraumatic.      Nose: Nose normal.      Mouth/Throat:      Mouth: Mucous membranes are moist.      Pharynx: Oropharynx is clear. No oropharyngeal exudate.   Eyes:      Pupils: Pupils are equal, round, and reactive to light.   Cardiovascular:      Rate and Rhythm: Normal rate and regular rhythm.      Heart sounds: No murmur heard.  Pulmonary:      Effort: Pulmonary effort is normal.      Breath sounds: Normal breath sounds. No wheezing.   Abdominal:       General: Bowel sounds are normal.      Palpations: Abdomen is soft.      Tenderness: There is no abdominal tenderness.   Musculoskeletal:         General: Normal range of motion.      Cervical back: Normal range of motion and neck supple.      Right lower leg: Edema present.      Left lower leg: Edema present.   Skin:     General: Skin is warm and dry.      Capillary Refill: Capillary refill takes less than 2 seconds.      Findings: No bruising, lesion or rash.   Neurological:      Mental Status: She is alert and oriented to person, place, and time.      Motor: No weakness.   Psychiatric:         Mood and Affect: Mood normal.         Behavior: Behavior normal.         Thought Content: Thought content normal.        ECOG Performance Status: (foot note - ECOG PS provided by Eastern Cooperative Oncology Group) 1 - Symptomatic but completely ambulatory    Karnofsky Performance Score:  90%- Able to Carry on Normal Activity: Minor Symptoms of Disease      Labs:   Lab Results   Component Value Date    WBC 3.67 (L) 2025    HGB 10.7 (L) 2025    HCT 32.4 (L) 2025    MCV 88 2025     2025       Lab Results   Component Value Date     2025    K 3.3 (L) 2025     2025    CO2 29 2025    BUN 5 (L) 2025    CREATININE 0.7 2025    ALBUMIN 3.1 (L) 2025    BILITOT 0.5 2025    ALKPHOS 61 2025    AST 13 2025    ALT 14 2025       Imagin/2/25 WB PET CT  Impression:  History of recently diagnosed myeloma.  No hypermetabolic lesions concerning for active disease.  Nonspecific hypermetabolic lymph nodes in the head and neck.  Attention on follow-up.    Pathology:  3/10/25 Bone Marrow Biopsy  RIGHT ILIAC CREST BONE MARROW ASPIRATE, BONE MARROW CLOT, AND BONE MARROW CORE BIOPSY WITH:     CELLULARITY= 70-80%, TRILINEAGE HEMATOPOIETIC ACTIVITY (M/E= 2.9:1).   PLASMA CELL NEOPLASM (65%).  SEE COMMENT.   GRADE I RETICULAR FIBROSIS.    STORAGE IRON NOT IDENTIFIED.   CONGO RED NEGATIVE.   ADEQUATE NUMBER OF MEGAKARYOCYTES.        Assessment and Plan:     Multiple myeloma not having achieved remission  - IgG lambda multiple myeloma, standard-risk by FISH, R-ISS Stage II  - Pre-treatment labs: m-protein 3.43 g/dL, lambda sFLC 6.43  - Normal calcium and renal function  - Patient is not a transplant candidate due to extensive cardiac history, per Dr. De La Garza  - 3/27/25: initiated lion-VRd, 25 mg lenalidomide days 1-21 and dexamethasone 40 mg weekly  - 6/17/25 biochemical studies with continued decrease in m-spike with VGPR prior to cycle 4  - Cycle 4, day 1 held for infectious symptoms, resume C4D1 on 6/26/25  - Plan for repeat BMBx and PET scan after 4 cycles on 7/30/25  - Contact me with any uncontrolled symptoms.    Immunodeficiency due to drugs  - Secondary to treatment, start acyclovir two times daily for shingles prophylaxis    Neutropenia, unspecified type  - Secondary to multiple myeloma, treatment, stable    Other thrombophilia  - Secondary to MM and revlimid, continue xarelto daily for VTE prophylaxis    Anemia in neoplastic disease  - Secondary to multiple myeloma, stable, asymptomatic    Leukopenia  - Noted on recent labs, above dose-adjustment threshold    Chemo Induced Diarrhea  - Mild, manageable diarrhea with treatment    Vitamin D deficiency  - Noted to be deficient on recent labs  - Continue ergocalciferol 50,000 units weekly    Obesity  - Long-standing, multiple chronic co-morbidities  - Ambulatory, no difficulty performing ADLs    Hypokalemia  - Mild, secondary to altered taste  - Continue potassium supplement 20 mEq daily    Altered Taste  - Secondary to chemotherapy, weight is overall stable  - [facility] dietary handout provided for taste alteration  - Offered dietician consult but likely not to help with this type of alteration       BMT Chart Routing      Follow up with physician    Follow up with FERNANDO 2 months. Shannon: MM follow up  7/24, 9/4, prior to treatment   Provider visit type    Infusion scheduling note    Injection scheduling note Add: 6/17 velcade, 7/24: lion/velcade, 7/31: velcade, 8/7: lion/velcade, 8/14: velcade   Labs CBC, CMP, free light chains, immunofixation, immunoglobulins and SPEP   Scheduling:  Preferred lab:  Lab interval:  7/23: 0900, 8/20: 0900   Imaging    Pharmacy appointment    Other referrals                 Orders Placed:      Orders Placed This Encounter    potassium chloride SA (K-DUR,KLOR-CON) 20 MEQ tablet           Total time of this visit was 33 minutes, including time spent face to face with patient and/or via video/audio, and also in preparing for today's visit for MDM and documentation. (Medical Decision Making, including consideration of possible diagnoses, management options, complex medical record review, review of diagnostic tests and information, consideration and discussion of significant complications based on comorbidities, and discussion with providers involved with the care of the patient). Greater than 50% was spent face to face with the patient counseling and coordinating care.    Visit today included increased complexity associated with the care of the episodic problem cold symptoms addressed and managing the longitudinal care of the patient due to the serious and/or complex managed problem(s) multiple myeloma.     Thank you for allowing me to partake in the care of this patient. If there are any questions, please do not hesitate to reach out.    Shannon Culver, FNP-C  Hematologic Malignancies, Stem Cell Transplantation, and Cellular Therapy  [facility]

## 2025-06-19 ENCOUNTER — OFFICE VISIT (OUTPATIENT)
Dept: HEMATOLOGY/ONCOLOGY | Facility: CLINIC | Age: 60
End: 2025-06-19
Payer: MEDICARE

## 2025-06-19 VITALS
HEART RATE: 60 BPM | DIASTOLIC BLOOD PRESSURE: 63 MMHG | SYSTOLIC BLOOD PRESSURE: 134 MMHG | RESPIRATION RATE: 18 BRPM | BODY MASS INDEX: 47.39 KG/M2 | OXYGEN SATURATION: 97 % | HEIGHT: 64 IN | WEIGHT: 277.56 LBS | TEMPERATURE: 98 F

## 2025-06-19 DIAGNOSIS — E66.813 CLASS 3 SEVERE OBESITY DUE TO EXCESS CALORIES WITH SERIOUS COMORBIDITY AND BODY MASS INDEX (BMI) OF 45.0 TO 49.9 IN ADULT: ICD-10-CM

## 2025-06-19 DIAGNOSIS — E87.6 HYPOKALEMIA: ICD-10-CM

## 2025-06-19 DIAGNOSIS — T45.1X5A LEUKOPENIA DUE TO ANTINEOPLASTIC CHEMOTHERAPY: ICD-10-CM

## 2025-06-19 DIAGNOSIS — R43.2 ALTERED TASTE: ICD-10-CM

## 2025-06-19 DIAGNOSIS — E66.01 CLASS 3 SEVERE OBESITY DUE TO EXCESS CALORIES WITH SERIOUS COMORBIDITY AND BODY MASS INDEX (BMI) OF 45.0 TO 49.9 IN ADULT: ICD-10-CM

## 2025-06-19 DIAGNOSIS — D70.1 LEUKOPENIA DUE TO ANTINEOPLASTIC CHEMOTHERAPY: ICD-10-CM

## 2025-06-19 DIAGNOSIS — K52.1 CHEMOTHERAPY INDUCED DIARRHEA: ICD-10-CM

## 2025-06-19 DIAGNOSIS — D64.89 ANEMIA DUE TO OTHER CAUSE, NOT CLASSIFIED: ICD-10-CM

## 2025-06-19 DIAGNOSIS — T45.1X5A CHEMOTHERAPY INDUCED DIARRHEA: ICD-10-CM

## 2025-06-19 DIAGNOSIS — C90.00 MULTIPLE MYELOMA NOT HAVING ACHIEVED REMISSION: Primary | ICD-10-CM

## 2025-06-19 DIAGNOSIS — D84.821 IMMUNODEFICIENCY DUE TO DRUGS: ICD-10-CM

## 2025-06-19 DIAGNOSIS — D68.69 OTHER THROMBOPHILIA: ICD-10-CM

## 2025-06-19 DIAGNOSIS — Z79.899 IMMUNODEFICIENCY DUE TO DRUGS: ICD-10-CM

## 2025-06-19 DIAGNOSIS — E55.9 VITAMIN D DEFICIENCY: ICD-10-CM

## 2025-06-19 LAB
CTP QC/QA: YES
SARS-COV-2 RDRP RESP QL NAA+PROBE: NEGATIVE

## 2025-06-19 PROCEDURE — 99999 PR PBB SHADOW E&M-EST. PATIENT-LVL V: CPT | Mod: PBBFAC,HCNC,,

## 2025-06-19 RX ORDER — BORTEZOMIB 3.5 MG/1
1.3 INJECTION, POWDER, LYOPHILIZED, FOR SOLUTION INTRAVENOUS; SUBCUTANEOUS
OUTPATIENT
Start: 2025-07-03

## 2025-06-19 RX ORDER — POTASSIUM CHLORIDE 20 MEQ/1
20 TABLET, EXTENDED RELEASE ORAL DAILY
Qty: 30 TABLET | Refills: 2 | Status: SHIPPED | OUTPATIENT
Start: 2025-06-19

## 2025-06-19 RX ORDER — PROCHLORPERAZINE EDISYLATE 5 MG/ML
10 INJECTION INTRAMUSCULAR; INTRAVENOUS ONCE AS NEEDED
OUTPATIENT
Start: 2025-07-10

## 2025-06-19 RX ORDER — DIPHENHYDRAMINE HYDROCHLORIDE 50 MG/ML
50 INJECTION, SOLUTION INTRAMUSCULAR; INTRAVENOUS ONCE AS NEEDED
OUTPATIENT
Start: 2025-07-10

## 2025-06-19 RX ORDER — SODIUM CHLORIDE 0.9 % (FLUSH) 0.9 %
10 SYRINGE (ML) INJECTION
OUTPATIENT
Start: 2025-07-17

## 2025-06-19 RX ORDER — PROCHLORPERAZINE EDISYLATE 5 MG/ML
10 INJECTION INTRAMUSCULAR; INTRAVENOUS ONCE AS NEEDED
OUTPATIENT
Start: 2025-07-03

## 2025-06-19 RX ORDER — EPINEPHRINE 0.3 MG/.3ML
0.3 INJECTION SUBCUTANEOUS ONCE AS NEEDED
OUTPATIENT
Start: 2025-07-17

## 2025-06-19 RX ORDER — DIPHENHYDRAMINE HYDROCHLORIDE 50 MG/ML
50 INJECTION, SOLUTION INTRAMUSCULAR; INTRAVENOUS ONCE AS NEEDED
OUTPATIENT
Start: 2025-07-17

## 2025-06-19 RX ORDER — EPINEPHRINE 0.3 MG/.3ML
0.3 INJECTION SUBCUTANEOUS ONCE AS NEEDED
OUTPATIENT
Start: 2025-06-26

## 2025-06-19 RX ORDER — HEPARIN 100 UNIT/ML
500 SYRINGE INTRAVENOUS
OUTPATIENT
Start: 2025-07-03

## 2025-06-19 RX ORDER — HEPARIN 100 UNIT/ML
500 SYRINGE INTRAVENOUS
OUTPATIENT
Start: 2025-07-10

## 2025-06-19 RX ORDER — AMLODIPINE BESYLATE 5 MG/1
5 TABLET ORAL
COMMUNITY
Start: 2025-05-26

## 2025-06-19 RX ORDER — EPINEPHRINE 0.3 MG/.3ML
0.3 INJECTION SUBCUTANEOUS ONCE AS NEEDED
OUTPATIENT
Start: 2025-07-03

## 2025-06-19 RX ORDER — SODIUM CHLORIDE 0.9 % (FLUSH) 0.9 %
10 SYRINGE (ML) INJECTION
OUTPATIENT
Start: 2025-06-26

## 2025-06-19 RX ORDER — SODIUM CHLORIDE 0.9 % (FLUSH) 0.9 %
10 SYRINGE (ML) INJECTION
OUTPATIENT
Start: 2025-07-03

## 2025-06-19 RX ORDER — PROCHLORPERAZINE EDISYLATE 5 MG/ML
10 INJECTION INTRAMUSCULAR; INTRAVENOUS ONCE AS NEEDED
OUTPATIENT
Start: 2025-06-26

## 2025-06-19 RX ORDER — DIPHENHYDRAMINE HYDROCHLORIDE 50 MG/ML
50 INJECTION, SOLUTION INTRAMUSCULAR; INTRAVENOUS ONCE AS NEEDED
OUTPATIENT
Start: 2025-06-26

## 2025-06-19 RX ORDER — SODIUM CHLORIDE 0.9 % (FLUSH) 0.9 %
10 SYRINGE (ML) INJECTION
OUTPATIENT
Start: 2025-07-10

## 2025-06-19 RX ORDER — EPINEPHRINE 0.3 MG/.3ML
0.3 INJECTION SUBCUTANEOUS ONCE AS NEEDED
OUTPATIENT
Start: 2025-07-10

## 2025-06-19 RX ORDER — HEPARIN 100 UNIT/ML
500 SYRINGE INTRAVENOUS
OUTPATIENT
Start: 2025-06-26

## 2025-06-19 RX ORDER — PROCHLORPERAZINE EDISYLATE 5 MG/ML
10 INJECTION INTRAMUSCULAR; INTRAVENOUS ONCE AS NEEDED
OUTPATIENT
Start: 2025-07-17

## 2025-06-19 RX ORDER — DIPHENHYDRAMINE HYDROCHLORIDE 50 MG/ML
50 INJECTION, SOLUTION INTRAMUSCULAR; INTRAVENOUS ONCE AS NEEDED
OUTPATIENT
Start: 2025-07-03

## 2025-06-19 RX ORDER — BORTEZOMIB 3.5 MG/1
1.3 INJECTION, POWDER, LYOPHILIZED, FOR SOLUTION INTRAVENOUS; SUBCUTANEOUS
OUTPATIENT
Start: 2025-07-10

## 2025-06-19 RX ORDER — BORTEZOMIB 3.5 MG/1
1.3 INJECTION, POWDER, LYOPHILIZED, FOR SOLUTION INTRAVENOUS; SUBCUTANEOUS
OUTPATIENT
Start: 2025-07-17

## 2025-06-19 RX ORDER — HEPARIN 100 UNIT/ML
500 SYRINGE INTRAVENOUS
OUTPATIENT
Start: 2025-07-17

## 2025-06-19 RX ORDER — BORTEZOMIB 3.5 MG/1
1.3 INJECTION, POWDER, LYOPHILIZED, FOR SOLUTION INTRAVENOUS; SUBCUTANEOUS
OUTPATIENT
Start: 2025-06-26

## 2025-06-19 NOTE — PROGRESS NOTES
Pt here for Covid POCT, 2 pt identifiers used. Pt was swabbed by nurse and test resulted negative. Pt had no further questions and was escorted back out to lobby

## 2025-06-21 ENCOUNTER — HOSPITAL ENCOUNTER (EMERGENCY)
Facility: HOSPITAL | Age: 60
Discharge: HOME | End: 2025-06-22
Attending: STUDENT IN AN ORGANIZED HEALTH CARE EDUCATION/TRAINING PROGRAM
Payer: MEDICARE

## 2025-06-21 DIAGNOSIS — M54.9 BACK PAIN: ICD-10-CM

## 2025-06-21 DIAGNOSIS — M25.512 LEFT SUBSCAPULAR PAIN: Primary | ICD-10-CM

## 2025-06-21 PROCEDURE — 93010 ELECTROCARDIOGRAM REPORT: CPT | Mod: HCNC,,, | Performed by: INTERNAL MEDICINE

## 2025-06-21 PROCEDURE — 99284 EMERGENCY DEPT VISIT MOD MDM: CPT | Mod: 25,HCNC

## 2025-06-21 PROCEDURE — 25000003 PHARM REV CODE 250: Mod: HCNC

## 2025-06-21 PROCEDURE — 93005 ELECTROCARDIOGRAM TRACING: CPT | Mod: HCNC

## 2025-06-21 RX ORDER — LIDOCAINE 50 MG/G
1 PATCH TOPICAL
Status: DISCONTINUED | OUTPATIENT
Start: 2025-06-21 | End: 2025-06-22 | Stop reason: HOSPADM

## 2025-06-21 RX ORDER — ACETAMINOPHEN 325 MG/1
650 TABLET ORAL
Status: COMPLETED | OUTPATIENT
Start: 2025-06-21 | End: 2025-06-21

## 2025-06-21 RX ORDER — METHOCARBAMOL 500 MG/1
1000 TABLET, FILM COATED ORAL
Status: COMPLETED | OUTPATIENT
Start: 2025-06-21 | End: 2025-06-21

## 2025-06-21 RX ADMIN — LIDOCAINE 1 PATCH: 50 PATCH TOPICAL at 11:06

## 2025-06-21 RX ADMIN — METHOCARBAMOL 1000 MG: 500 TABLET ORAL at 11:06

## 2025-06-21 RX ADMIN — ACETAMINOPHEN 650 MG: 325 TABLET ORAL at 11:06

## 2025-06-22 VITALS
OXYGEN SATURATION: 97 % | BODY MASS INDEX: 47.29 KG/M2 | HEART RATE: 64 BPM | TEMPERATURE: 98 F | SYSTOLIC BLOOD PRESSURE: 145 MMHG | DIASTOLIC BLOOD PRESSURE: 80 MMHG | RESPIRATION RATE: 18 BRPM | WEIGHT: 277 LBS | HEIGHT: 64 IN

## 2025-06-22 RX ORDER — LIDOCAINE 50 MG/G
1 PATCH TOPICAL DAILY
Qty: 15 PATCH | Refills: 0 | Status: SHIPPED | OUTPATIENT
Start: 2025-06-22

## 2025-06-22 RX ORDER — HYDROCODONE BITARTRATE AND ACETAMINOPHEN 5; 325 MG/1; MG/1
1 TABLET ORAL EVERY 8 HOURS PRN
Qty: 3 TABLET | Refills: 0 | Status: SHIPPED | OUTPATIENT
Start: 2025-06-22 | End: 2025-06-23

## 2025-06-22 RX ORDER — ACETAMINOPHEN 500 MG
500 TABLET ORAL EVERY 6 HOURS PRN
Qty: 28 TABLET | Refills: 0 | Status: SHIPPED | OUTPATIENT
Start: 2025-06-22 | End: 2025-06-29

## 2025-06-22 RX ORDER — METHOCARBAMOL 500 MG/1
1000 TABLET, FILM COATED ORAL 3 TIMES DAILY
Qty: 30 TABLET | Refills: 0 | Status: SHIPPED | OUTPATIENT
Start: 2025-06-22 | End: 2025-06-27

## 2025-06-22 NOTE — ED PROVIDER NOTES
Encounter Date: 6/21/2025       History     Chief Complaint   Patient presents with    Back Pain     Pt arrived to the ED due to left upper back pain x 3-4 days that worsens with movement. Denies any recent injuries or falls. Reports currently on antibiotics for an URI. Denies SOB      Amna Chawla is a 59-year-old female with extensive past medical history including paroxysmal atrial fibrillation, cardiac arrest status post pacemaker placement, CVA, and multiple myeloma who presents to the emergency department for evaluation of left shoulder pain.  Pain is mostly under the left shoulder blade.  It does not radiate.  No alleviating factors, patient states that it is exacerbated with any movements of the left arm or of the back including changing positions.  She denies numbness, weakness, or loss of function of the left arm.  Denies chest pain, shortness of breath, dyspnea, or diaphoresis.  She has been coughing a lot recently, recently finished a 5 day course of azithromycin for an upper respiratory infection.    The history is provided by the patient and medical records. No  was used.     Review of patient's allergies indicates:   Allergen Reactions    Aspirin Hives    Imitrex [sumatriptan] Palpitations    Penicillins Hives and Swelling    Shellfish containing products Anaphylaxis     seafood    Reglan [metoclopramide hcl] Other (See Comments)     Parkinsonism      Past Medical History:   Diagnosis Date    Anticoagulant long-term use     Anxiety     Asthma     Atrial fibrillation     Brain anoxic injury     Cervicalgia 8/28/2014    [facility]I (closed head injury) 2/19/2013    Convulsion 5/30/2015    Decreased ROM of left shoulder 4/12/2017    Defibrillator activation 2013    Depression     Heart block     History of sudden cardiac arrest 2/2013    PEA arrest with subsequent long-QT    Hx of psychiatric care     Hypertension     Left atrial enlargement 4/11/2018    Pacemaker 2013    Paresthesia  11/1/2013    Prolonged Q-T interval on ECG 2/8/2013    Psychiatric problem     Seizures     Sleep difficulties     Stroke     weakness lt side    Therapy     Thyroid disease     Upper airway resistance syndrome 2/21/2017     Past Surgical History:   Procedure Laterality Date    breast reduction      CARDIAC DEFIBRILLATOR PLACEMENT      CARDIAC DEFIBRILLATOR PLACEMENT      CARPAL TUNNEL RELEASE Right     COLONOSCOPY N/A 5/7/2019    Procedure: COLONOSCOPY;  Surgeon: Juan Coffey MD;  Location: [facility] ENDO (2ND FLR);  Service: Endoscopy;  Laterality: N/A;  per DR. Bustamante Pt to have balloon with DR. Coffey due to excesive looping, could not reach cecum - see telephone encounter 3/8/19 and last procedure report dated 6/24/14/ Per Piedad schedule as 90 min case- ERW  pacemaker/AICD Boitronik/   per , ok to hold Xareltox 2 days    COLONOSCOPY N/A 6/2/2022    Procedure: COLONOSCOPY;  Surgeon: Juan Coffey MD;  Location: [facility] (2ND FLR);  Service: Endoscopy;  Laterality: N/A;  combined orders    ESOPHAGOGASTRODUODENOSCOPY N/A 6/2/2022    Procedure: EGD (ESOPHAGOGASTRODUODENOSCOPY);  Surgeon: Juan Coffey MD;  Location: [facility] ENDO (2ND FLR);  Service: Endoscopy;  Laterality: N/A;  BMI 54; pt requests 1st available [facility]  Fully vaccinated, Hold Xarelto x 2 days per Dr. Mejia and Plavix x 5 days per Dr. Grossman see telephone encounter 4/25/22, Biotronik PM/AICD, prep instr portal and mailed -ml    HERNIA REPAIR      HIATAL HERNIA REPAIR      INSERT / REPLACE / REMOVE PACEMAKER  10/2017    CRT-D upgrade    REVISION OF IMPLANTABLE CARDIOVERTER-DEFIBRILLATOR (ICD) ELECTRODE LEAD PLACEMENT Left 12/7/2020    Procedure: REVISION, INSERTION, ELECTRODE LEAD, ICD;  Surgeon: Avelino Mejia MD;  Location: [facility] EP LAB;  Service: Cardiology;  Laterality: Left;    REVISION OF SKIN POCKET FOR CARDIOVERTER-DEFIBRILLATOR  12/7/2020    Procedure: Revision, Skin Pocket, For Cardioverter-Defibrillator;  Surgeon: Avelino Mejia MD;   Location: [facility] EP LAB;  Service: Cardiology;;    TOTAL REDUCTION MAMMOPLASTY      TRANSESOPHAGEAL ECHOCARDIOGRAPHY N/A 12/7/2020    Procedure: ECHOCARDIOGRAM, TRANSESOPHAGEAL;  Surgeon: Ivory Goodman MD;  Location: [facility] EP LAB;  Service: Cardiology;  Laterality: N/A;    TRANSESOPHAGEAL ECHOCARDIOGRAPHY N/A 12/7/2020    Procedure: ECHOCARDIOGRAM, TRANSESOPHAGEAL;  Surgeon: Patrick Diagnostic Provider;  Location: [facility] OR 2ND FLR;  Service: Cardiology;  Laterality: N/A;    TRIGGER FINGER RELEASE Left 8/2/2019    Procedure: RELEASE, TRIGGER FINGER left middle;  Surgeon: Matt Pugh Jr., MD;  Location: [facility] OR;  Service: Plastics;  Laterality: Left;    TRIGGER FINGER RELEASE Right 2/11/2020    Procedure: RELEASE, TRIGGER FINGER middle;  Surgeon: Matt Pugh Jr., MD;  Location: [facility];  Service: Plastics;  Laterality: Right;    TUBAL LIGATION       Family History   Problem Relation Name Age of Onset    COPD Mother      Hypertension Mother      Hypertension Father      Asthma Daughter Donna     Asthma Daughter Lauryn     Glaucoma Maternal Grandmother      Glaucoma Paternal Grandmother      COPD Other      Heart failure Other       Social History[1]  Review of Systems   Constitutional:  Negative for chills, diaphoresis and fever.   HENT:  Negative for congestion and sore throat.    Respiratory:  Positive for cough. Negative for chest tightness and shortness of breath.    Cardiovascular:  Negative for chest pain and palpitations.   Gastrointestinal:  Negative for abdominal pain, nausea and vomiting.   Genitourinary:  Negative for decreased urine volume, dysuria, frequency and urgency.   Musculoskeletal:  Positive for arthralgias (Left shoulder, shoulder blade) and myalgias. Negative for back pain.   Skin:  Negative for rash.   Neurological:  Negative for weakness and headaches.   Hematological:  Does not bruise/bleed easily.       Physical Exam     Initial Vitals [06/21/25 2210]   BP Pulse Resp Temp SpO2    (!) 126/59 88 20 98.2 °F (36.8 °C) 98 %      MAP       --         Physical Exam    Nursing note and vitals reviewed.  Constitutional: Vital signs are normal. She appears well-developed and well-nourished. She is cooperative. She does not appear ill. No distress.   Well-appearing.  No acute distress.  Alert and interactive.   HENT:   Head: Normocephalic and atraumatic.   Right Ear: Hearing and external ear normal.   Left Ear: Hearing and external ear normal.   Nose: Nose normal.   Moist mucous membranes.   Eyes: Conjunctivae and EOM are normal.   Neck: Phonation normal.   Normal range of motion.  Cardiovascular:  Normal rate and regular rhythm.           No murmur heard.  Regular rate and rhythm.  No murmur or friction rub.   Pulmonary/Chest: Effort normal. No respiratory distress.   Respirations even and unlabored.  No adventitious sounds of breathing.  Good air movement.   Abdominal: Abdomen is soft. She exhibits no distension. There is no abdominal tenderness.   Musculoskeletal:      Cervical back: Normal range of motion.      Comments: Spine palpated from occiput to sacrum.  No midline bony tenderness.  No bony step-offs.  Tenderness to palpation of the subscapular musculature. No tenderness over the glenohumeral joint.  No overlying skin changes.  Full range of motion of the left shoulder, elbow, wrist, and hand.  Sensation intact to bilateral hands.     Neurological: She is alert and oriented to person, place, and time. GCS eye subscore is 4. GCS verbal subscore is 5. GCS motor subscore is 6.   Skin: Skin is warm. Capillary refill takes less than 2 seconds.   No rash, erythema, warmth, or skin lesions.         ED Course   Procedures  Labs Reviewed - No data to display  EKG Readings: (Independently Interpreted)   Atrial sensed ventricular paced rhythm.  Ventricular rate 74 beats per minute.   milliseconds.  Normal axis.  No ST segment elevations or depressions.  No unexpected T-wave inversions.  No  STEMI.  Compared to most recent EKG dated January 30, 2025, rate has increased by 4 beats per minute.       Imaging Results              X-Ray Chest PA And Lateral (Final result)  Result time 06/21/25 22:57:48      Final result by Drea Katz MD (06/21/25 22:57:48)                   Impression:      As above described.      Electronically signed by: Drea Katz  Date:    06/21/2025  Time:    22:57               Narrative:    EXAMINATION:  CHEST PA AND LATERAL    CLINICAL HISTORY:  Dorsalgia, unspecified    TECHNIQUE:  PA and lateral chest radiograph    COMPARISON:  06/17/2025    FINDINGS:  There is a left subclavian pacer.  The cardiac silhouette is within normal limits.  There is increased patchy interstitial and parenchymal attenuation, which may suggest mild edema or infiltrates.  No pneumothorax or significant pleural fluid is detected.                                       X-Ray Thoracic Spine AP Lateral (Final result)  Result time 06/21/25 22:47:56      Final result by Drea Katz MD (06/21/25 22:47:56)                   Impression:      No acute bony abnormality.  Moderate spondylitic changes.      Electronically signed by: Drea Katz  Date:    06/21/2025  Time:    22:47               Narrative:    EXAMINATION:  THORACIC SPINE    CLINICAL HISTORY:  <Pain.>    TECHNIQUE:  AP and lateral view of the thoracic spine    COMPARISON:  <None. >    FINDINGS:  AP and lateral images of the thoracic spine demonstrates <satisfactory alignment of the spine>.  There is no definite acute fracture or subluxation.  Marginal osteophytes are present.  There is a left subclavian pacer.  Cardiomegaly is noted.                                       Medications   LIDOcaine 5 % patch 1 patch (1 patch Transdermal Patch Applied 6/21/25 2310)   acetaminophen tablet 650 mg (650 mg Oral Given 6/21/25 2309)   methocarbamoL tablet 1,000 mg (1,000 mg Oral Given 6/21/25 2309)     Medical Decision Making  59-year-old  female with extensive past medical history as listed above presenting to the emergency department for evaluation of 3-4 days of left shoulder pain/back pain.  No specific trauma, fall, or other inciting incident noting that she has been coughing a lot recently secondary to an upper respiratory infection.  She just finished a course of azithromycin for this.  Exacerbated by movement.  No numbness, weakness, or loss of function.  Denies chest pain, shortness of breath, dyspnea, diaphoresis.  On exam, she is well-appearing and in no acute distress.  Mildly hypertensive, all other vitals are within normal limits.  Tenderness to palpation of the subscapular musculature.  No overlying skin changes.  No midline bony tenderness throughout the spine.  No bony step-offs.  Full range of motion of the left shoulder.  Neurovascularly intact in bilateral upper extremities.    Differential diagnosis includes but is not limited to muscle strain, muscle sprain, tendinitis, shoulder impingement syndrome, rotator cuff injury, lumbago with or without sciatica, low back strain, contusion, fracture, compression fracture, dislocation, cauda equina syndrome, spinal epidural abscess, spinal epidural hematoma.    X-rays of the chest and thoracic spine negative for acute consolidative process, fracture, dislocation.  EKG is nonischemic, paced rhythm, no tachycardia or arrhythmia.  Presentation is most consistent with subscapular muscular pain.  Reassuring that pain is reproducible with palpation and with certain movements. Patient was given Tylenol, Robaxin, Lidoderm in the emergency department with good relief of symptoms.  Patient has an extensive medical history including cardiac arrest.  However she denies any chest pain, shortness of breath, or diaphoresis.  EKG with no ischemic changes or arrhythmia.  Low suspicion for ACS, arrhythmia at this time.  I will discharge her home with medications listed below for continued symptomatic  control.  Stable for discharge home to outpatient follow up.    Return precautions were discussed, all patient questions were answered, and the patient was agreeable to the plan of care.  She was discharged home in stable condition and will follow up with her primary care provider or return to the emergency department if her symptoms worsen or do not improve.     Amount and/or Complexity of Data Reviewed  Radiology: ordered.    Risk  OTC drugs.  Prescription drug management.                                      Clinical Impression:  Final diagnoses:  [M54.9] Back pain  [M25.512] Left subscapular pain (Primary)          ED Disposition Condition    Discharge Stable          ED Prescriptions       Medication Sig Dispense Start Date End Date Auth. Provider    LIDOcaine (LIDODERM) 5 % Place 1 patch onto the skin once daily. Remove & Discard patch within 12 hours or as directed by MD 15 patch 6/22/2025 -- Milo Clemons PA-C    HYDROcodone-acetaminophen (NORCO) 5-325 mg per tablet Take 1 tablet by mouth every 8 (eight) hours as needed (Pain not relieved with other methods). 3 tablet 6/22/2025 6/23/2025 Milo Clemons PA-C    acetaminophen (TYLENOL) 500 MG tablet Take 1 tablet (500 mg total) by mouth every 6 (six) hours as needed. 28 tablet 6/22/2025 6/29/2025 Milo Clemons PA-C    methocarbamoL (ROBAXIN) 500 MG Tab Take 2 tablets (1,000 mg total) by mouth 3 (three) times daily. for 5 days 30 tablet 6/22/2025 6/27/2025 Milo Clemons, GORDON          Follow-up Information       Follow up With Specialties Details Why Contact Info    Tiffany Mina MD Internal Medicine Schedule an appointment as soon as possible for a visit  As needed, [facility] symptoms worsen [facility] [address]  [phone]      [facility] Emergency Dept Emergency Medicine Go to  If symptoms worsen [address] [facility] [place] [address] [phone]                   [1]    Social History  Tobacco Use    Smoking status: Never     Passive exposure: Never    Smokeless tobacco: Never   Substance Use Topics    Alcohol use: Not Currently    Drug use: No        Milo Clemons, PA-C  06/22/25 0058

## 2025-06-22 NOTE — DISCHARGE INSTRUCTIONS

## 2025-06-23 ENCOUNTER — PATIENT OUTREACH (OUTPATIENT)
Facility: OTHER | Age: 60
End: 2025-06-23
Payer: MEDICARE

## 2025-06-23 LAB
OHS QRS DURATION: 116 MS
OHS QTC CALCULATION: 523 MS

## 2025-06-24 ENCOUNTER — TELEPHONE (OUTPATIENT)
Dept: NEUROLOGY | Facility: CLINIC | Age: 60
End: 2025-06-24
Payer: MEDICARE

## 2025-06-26 ENCOUNTER — PATIENT MESSAGE (OUTPATIENT)
Dept: ELECTROPHYSIOLOGY | Facility: CLINIC | Age: 60
End: 2025-06-26
Payer: MEDICARE

## 2025-06-26 ENCOUNTER — INFUSION (OUTPATIENT)
Dept: INFUSION THERAPY | Facility: HOSPITAL | Age: 60
End: 2025-06-26
Payer: MEDICARE

## 2025-06-26 ENCOUNTER — TELEPHONE (OUTPATIENT)
Dept: NEUROLOGY | Facility: CLINIC | Age: 60
End: 2025-06-26
Payer: MEDICARE

## 2025-06-26 VITALS
RESPIRATION RATE: 18 BRPM | DIASTOLIC BLOOD PRESSURE: 82 MMHG | HEIGHT: 64 IN | TEMPERATURE: 98 F | OXYGEN SATURATION: 98 % | HEART RATE: 87 BPM | BODY MASS INDEX: 46.82 KG/M2 | WEIGHT: 274.25 LBS | SYSTOLIC BLOOD PRESSURE: 159 MMHG

## 2025-06-26 DIAGNOSIS — C90.00 MULTIPLE MYELOMA NOT HAVING ACHIEVED REMISSION: Primary | ICD-10-CM

## 2025-06-26 DIAGNOSIS — I48.0 PAROXYSMAL ATRIAL FIBRILLATION: Primary | ICD-10-CM

## 2025-06-26 PROCEDURE — 96401 CHEMO ANTI-NEOPL SQ/IM: CPT | Mod: HCNC

## 2025-06-26 PROCEDURE — 63600175 PHARM REV CODE 636 W HCPCS: Mod: JZ,TB,HCNC

## 2025-06-26 RX ORDER — EPINEPHRINE 0.3 MG/.3ML
0.3 INJECTION SUBCUTANEOUS ONCE AS NEEDED
Status: DISCONTINUED | OUTPATIENT
Start: 2025-06-26 | End: 2025-06-26 | Stop reason: HOSPADM

## 2025-06-26 RX ORDER — DIPHENHYDRAMINE HYDROCHLORIDE 50 MG/ML
50 INJECTION, SOLUTION INTRAMUSCULAR; INTRAVENOUS ONCE AS NEEDED
Status: DISCONTINUED | OUTPATIENT
Start: 2025-06-26 | End: 2025-06-26 | Stop reason: HOSPADM

## 2025-06-26 RX ORDER — BORTEZOMIB 3.5 MG/1
1.3 INJECTION, POWDER, LYOPHILIZED, FOR SOLUTION INTRAVENOUS; SUBCUTANEOUS
Status: COMPLETED | OUTPATIENT
Start: 2025-06-26 | End: 2025-06-26

## 2025-06-26 RX ADMIN — BORTEZOMIB 3.25 MG: 3.5 INJECTION, POWDER, LYOPHILIZED, FOR SOLUTION INTRAVENOUS; SUBCUTANEOUS at 11:06

## 2025-06-26 RX ADMIN — DARATUMUMAB AND HYALURONIDASE-FIHJ (HUMAN RECOMBINANT) 1800 MG: 1800; 30000 INJECTION SUBCUTANEOUS at 11:06

## 2025-06-26 NOTE — TELEPHONE ENCOUNTER
Called pt to inquire about cancelled botox appt. Pt said that she severely hurt her back for which she had to go to the ER for and was unable to move let alone come get botox. I offered pt botox with FERNANDO's pt declined stating she would like to wait to get botox with Dr. Cortez no matter how long the wait was. I asked pt to reach out to us once her back feels better and she is able to come in for botox. Patient verbalized agreement

## 2025-06-27 ENCOUNTER — OFFICE VISIT (OUTPATIENT)
Dept: INTERNAL MEDICINE | Facility: CLINIC | Age: 60
End: 2025-06-27
Payer: MEDICARE

## 2025-06-27 VITALS
HEIGHT: 64 IN | SYSTOLIC BLOOD PRESSURE: 134 MMHG | BODY MASS INDEX: 46.55 KG/M2 | WEIGHT: 272.69 LBS | OXYGEN SATURATION: 99 % | HEART RATE: 76 BPM | DIASTOLIC BLOOD PRESSURE: 78 MMHG

## 2025-06-27 DIAGNOSIS — R05.9 COUGH, UNSPECIFIED TYPE: ICD-10-CM

## 2025-06-27 DIAGNOSIS — C90.00 MULTIPLE MYELOMA NOT HAVING ACHIEVED REMISSION: ICD-10-CM

## 2025-06-27 DIAGNOSIS — M54.6 ACUTE LEFT-SIDED THORACIC BACK PAIN: Primary | ICD-10-CM

## 2025-06-27 PROCEDURE — 99999 PR PBB SHADOW E&M-EST. PATIENT-LVL V: CPT | Mod: PBBFAC,HCNC,, | Performed by: NURSE PRACTITIONER

## 2025-06-27 NOTE — PROGRESS NOTES
INTERNAL MEDICINE PROGRESS/URGENT CARE NOTE    CHIEF COMPLAINT     Chief Complaint   Patient presents with    Mid-back Pain     Mostly to the side of the mid line of the left side        HPI     Amna Chawla is a 59 y.o. female who presents for an urgent/follow up visit today.    ER f/u.     Started with L thoracic back pain about 1 week ago. Went to the ER.   No alleviating factors, patient states that it is exacerbated with any movements of the left arm or of the back including changing positions. Worse with palptation. She denies numbness, weakness, or loss of function of the left arm. Denies chest pain, shortness of breath, dyspnea, or diaphoresis. I treated her for possible bacterial URI a days prior. Completed azithromycin. Still had a cough.   Xrays of T spine negative for acute issue. EKG nonischmic paced-rhythm. Felt to be MSK and given Robaxin, norco, lidoderm patches. She wasn't able to get the lidocaine patch.     She continues with pain to area. No rash. Still has a mild dry cough. No changes in nature of pain.   Heating pad helps. Robaxin doesn't help.   No fevers, sob, cp.      Problem List[1]     Past Medical History:  Past Medical History:   Diagnosis Date    Anticoagulant long-term use     Anxiety     Asthma     Atrial fibrillation     Brain anoxic injury     Cervicalgia 8/28/2014    CHI (closed head injury) 2/19/2013    Convulsion 5/30/2015    Decreased ROM of left shoulder 4/12/2017    Defibrillator activation 2013    Depression     Heart block     History of sudden cardiac arrest 2/2013    PEA arrest with subsequent long-QT    Hx of psychiatric care     Hypertension     Left atrial enlargement 4/11/2018    Pacemaker 2013    Paresthesia 11/1/2013    Prolonged Q-T interval on ECG 2/8/2013    Psychiatric problem     Seizures     Sleep difficulties     Stroke     weakness lt side    Therapy     Thyroid disease     Upper airway resistance syndrome 2/21/2017        Past Surgical History:  Past  Surgical History:   Procedure Laterality Date    breast reduction      CARDIAC DEFIBRILLATOR PLACEMENT      CARDIAC DEFIBRILLATOR PLACEMENT      CARPAL TUNNEL RELEASE Right     COLONOSCOPY N/A 5/7/2019    Procedure: COLONOSCOPY;  Surgeon: Juan Coffey MD;  Location: [facility] (2ND FLR);  Service: Endoscopy;  Laterality: N/A;  per DR. Bustamante Pt to have balloon with DR. Coffey due to excesive looping, could not reach cecum - see telephone encounter 3/8/19 and last procedure report dated 6/24/14/ Per [facility] schedule as 90 min case- ERW  pacemaker/AICD Boitronik/   per , ok to hold Xareltox 2 days    COLONOSCOPY N/A 6/2/2022    Procedure: COLONOSCOPY;  Surgeon: Juan Coffey MD;  Location: [facility] (2ND FLR);  Service: Endoscopy;  Laterality: N/A;  combined orders    ESOPHAGOGASTRODUODENOSCOPY N/A 6/2/2022    Procedure: EGD (ESOPHAGOGASTRODUODENOSCOPY);  Surgeon: Juan Coffey MD;  Location: [facility] (2ND FLR);  Service: Endoscopy;  Laterality: N/A;  BMI 54; pt requests 1st available [facility]  Fully vaccinated, Hold Xarelto x 2 days per Dr. Mejia and Plavix x 5 days per Dr. Grossman see telephone encounter 4/25/22, Biotronik PM/AICD, prep instr portal and mailed -ml    HERNIA REPAIR      HIATAL HERNIA REPAIR      INSERT / REPLACE / REMOVE PACEMAKER  10/2017    CRT-D upgrade    REVISION OF IMPLANTABLE CARDIOVERTER-DEFIBRILLATOR (ICD) ELECTRODE LEAD PLACEMENT Left 12/7/2020    Procedure: REVISION, INSERTION, ELECTRODE LEAD, ICD;  Surgeon: Avelino Mejia MD;  Location: [facility];  Service: Cardiology;  Laterality: Left;    REVISION OF SKIN POCKET FOR CARDIOVERTER-DEFIBRILLATOR  12/7/2020    Procedure: Revision, Skin Pocket, For Cardioverter-Defibrillator;  Surgeon: Avelino Mejia MD;  Location: [facility];  Service: Cardiology;;    TOTAL REDUCTION MAMMOPLASTY      TRANSESOPHAGEAL ECHOCARDIOGRAPHY N/A 12/7/2020    Procedure: ECHOCARDIOGRAM, TRANSESOPHAGEAL;  Surgeon: Ivory Goodman MD;  Location: [facility]  "LAB;  Service: Cardiology;  Laterality: N/A;    TRANSESOPHAGEAL ECHOCARDIOGRAPHY N/A 12/7/2020    Procedure: ECHOCARDIOGRAM, TRANSESOPHAGEAL;  Surgeon: Patrick Diagnostic Provider;  Location: [address];  Service: Cardiology;  Laterality: N/A;    TRIGGER FINGER RELEASE Left 8/2/2019    Procedure: RELEASE, TRIGGER FINGER left middle;  Surgeon: Matt Pugh Jr., MD;  Location: [facility];  Service: Plastics;  Laterality: Left;    TRIGGER FINGER RELEASE Right 2/11/2020    Procedure: RELEASE, TRIGGER FINGER middle;  Surgeon: Matt Pugh Jr., MD;  Location: [facility];  Service: Plastics;  Laterality: Right;    TUBAL LIGATION          Allergies:  Review of patient's allergies indicates:   Allergen Reactions    Aspirin Hives    Imitrex [sumatriptan] Palpitations    Penicillins Hives and Swelling    Shellfish containing products Anaphylaxis     seafood    Reglan [metoclopramide hcl] Other (See Comments)     Parkinsonism        Home Medications:  Current Medications[2]     Review of Systems:  Review of Systems   Constitutional:  Negative for fever.   HENT:  Negative for congestion and sore throat.    Respiratory:  Positive for cough. Negative for shortness of breath.    Cardiovascular:  Negative for chest pain.   Musculoskeletal:  Positive for back pain.   Neurological:  Negative for weakness and headaches.         PHYSICAL EXAM     Vitals:    06/27/25 0914 06/27/25 0928   BP: (!) 142/82 134/78   BP Location: Right arm    Patient Position: Sitting    Pulse: 76    SpO2: 99%    Weight: 123.7 kg (272 lb 11.3 oz)    Height: 5' 4" (1.626 m)       Body mass index is 46.81 kg/m².     Physical Exam  Vitals reviewed.   Constitutional:       Appearance: Normal appearance.   HENT:      Head: Normocephalic.      Mouth/Throat:      Mouth: Mucous membranes are moist.      Pharynx: Oropharynx is clear.   Eyes:      Conjunctiva/sclera: Conjunctivae normal.      Pupils: Pupils are equal, round, and reactive to light. "   Cardiovascular:      Rate and Rhythm: Normal rate and regular rhythm.   Pulmonary:      Effort: Pulmonary effort is normal.      Breath sounds: No rhonchi or rales.          Comments: She has a slight wheeze to LLL.   Ttp. No rash  Skin:     General: Skin is warm and dry.   Neurological:      General: No focal deficit present.      Mental Status: She is alert.   Psychiatric:         Mood and Affect: Mood normal.         Behavior: Behavior normal.         LABS     Lab Results   Component Value Date    HGBA1C 6.2 (H) 02/26/2025     CMP  Sodium   Date Value Ref Range Status   06/17/2025 143 136 - 145 mmol/L Final   03/18/2025 135 (L) 136 - 145 mmol/L Final     Potassium   Date Value Ref Range Status   06/17/2025 3.3 (L) 3.5 - 5.1 mmol/L Final   03/18/2025 3.9 3.5 - 5.1 mmol/L Final     Chloride   Date Value Ref Range Status   06/17/2025 107 95 - 110 mmol/L Final   03/18/2025 108 95 - 110 mmol/L Final     CO2   Date Value Ref Range Status   06/17/2025 29 23 - 29 mmol/L Final   03/18/2025 23 23 - 29 mmol/L Final     Glucose   Date Value Ref Range Status   06/17/2025 97 70 - 110 mg/dL Final   03/18/2025 102 70 - 110 mg/dL Final     BUN   Date Value Ref Range Status   06/17/2025 5 (L) 6 - 20 mg/dL Final     Creatinine   Date Value Ref Range Status   06/17/2025 0.7 0.5 - 1.4 mg/dL Final     Calcium   Date Value Ref Range Status   06/17/2025 8.9 8.7 - 10.5 mg/dL Final   03/18/2025 8.7 8.7 - 10.5 mg/dL Final     Protein Total   Date Value Ref Range Status   06/17/2025 5.6 (L) 6.0 - 8.4 gm/dL Final   06/17/2025 5.2 (L) 6.0 - 8.4 gm/dL Final     Comment:     Serum protein electrophoresis and immunofixation results should be interpreted in clinical context in that some therapeutic agents can result in false positive results (example, daratumumab). Correlation with the patient's therapeutic regimen is required.     Total Protein   Date Value Ref Range Status   03/18/2025 10.0 (H) 6.0 - 8.4 g/dL Final     Albumin   Date Value  Ref Range Status   06/17/2025 3.1 (L) 3.5 - 5.2 g/dL Final   03/18/2025 2.9 (L) 3.5 - 5.2 g/dL Final     Total Bilirubin   Date Value Ref Range Status   03/18/2025 0.3 0.1 - 1.0 mg/dL Final     Comment:     For infants and newborns, interpretation of results should be based  on gestational age, weight and in agreement with clinical  observations.    Premature Infant recommended reference ranges:  Up to 24 hours.............<8.0 mg/dL  Up to 48 hours............<12.0 mg/dL  3-5 days..................<15.0 mg/dL  6-29 days.................<15.0 mg/dL       Bilirubin Total   Date Value Ref Range Status   06/17/2025 0.5 0.1 - 1.0 mg/dL Final     Comment:     For infants and newborns, interpretation of results should be based   on gestational age, weight and in agreement with clinical   observations.    Premature Infant recommended reference ranges:   0-24 hours:  <8.0 mg/dL   24-48 hours: <12.0 mg/dL   3-5 days:    <15.0 mg/dL   6-29 days:   <15.0 mg/dL     Alkaline Phosphatase   Date Value Ref Range Status   03/18/2025 57 40 - 150 U/L Final     ALP   Date Value Ref Range Status   06/17/2025 61 40 - 150 unit/L Final     AST   Date Value Ref Range Status   06/17/2025 13 11 - 45 unit/L Final   03/18/2025 14 10 - 40 U/L Final     ALT   Date Value Ref Range Status   06/17/2025 14 10 - 44 unit/L Final   03/18/2025 16 10 - 44 U/L Final     Anion Gap   Date Value Ref Range Status   06/17/2025 7 (L) 8 - 16 mmol/L Final     eGFR if    Date Value Ref Range Status   05/17/2022 >60.0 >60 mL/min/1.73 m^2 Final     eGFR if non    Date Value Ref Range Status   05/17/2022 >60.0 >60 mL/min/1.73 m^2 Final     Comment:     Calculation used to obtain the estimated glomerular filtration  rate (eGFR) is the CKD-EPI equation.        Lab Results   Component Value Date    WBC 3.67 (L) 06/17/2025    HGB 10.7 (L) 06/17/2025    HCT 32.4 (L) 06/17/2025    MCV 88 06/17/2025     06/17/2025     Lab Results    Component Value Date    CHOL 100 (L) 02/26/2025    CHOL 162 04/02/2024    CHOL 97 (L) 05/05/2023     Lab Results   Component Value Date    HDL 28 (L) 02/26/2025    HDL 32 (L) 04/02/2024    HDL 31 (L) 05/05/2023     Lab Results   Component Value Date    LDLCALC 56.8 (L) 02/26/2025    LDLCALC 108.4 04/02/2024    LDLCALC 52.6 (L) 05/05/2023     Lab Results   Component Value Date    TRIG 76 02/26/2025    TRIG 108 04/02/2024    TRIG 67 05/05/2023     Lab Results   Component Value Date    CHOLHDL 28.0 02/26/2025    CHOLHDL 19.8 (L) 04/02/2024    CHOLHDL 32.0 05/05/2023     Lab Results   Component Value Date    TSH 0.989 06/17/2025    R6VAIVY 141.42 02/07/2013    U6SCCLA 8.4 02/07/2013       ASSESSMENT     1. Acute left-sided thoracic back pain    2. Cough, unspecified type    3. Multiple myeloma not having achieved remission           PLAN  Back pain- pain still sounds MSK. Recommend stopping the robaxin and trying her prescribed tizanidine instead. Could just be muscle strain from coughing. However she does has a mild wheeze on that side. Continue tylenol, heating pad and tizanidine. If no improvement by next week, send for CT chest to r/o lung process or bony issue.   Cough- continue mucinex  MM- mgmnt per hem/onc. On chemo injections currently.     1. Acute left-sided thoracic back pain  - CT Chest Without Contrast; Future    2. Cough, unspecified type  - CT Chest Without Contrast; Future    3. Multiple myeloma not having achieved remission  - CT Chest Without Contrast; Future       Follow up with PCP     Patient's plan/treatment was discussed including medications and possible side effects. Verbalized understanding of all instructions.     This note was partly generated with DragonRAD voice recognition software. I apologize for any possible typographical errors.          INOCENCIA Joshi-C  Department of Internal Medicine - [facility]  06/27/2025          [1]   Patient Active Problem List  Diagnosis     Complete AV block    Mixed hyperlipidemia    History of CVA (cerebrovascular accident)    Knee pain    Hiatal hernia    GERD (gastroesophageal reflux disease)    Occipital neuralgia    Chronic migraine without aura, with intractable migraine, so stated, with status migrainosus    History of sudden cardiac arrest    Biventricular automatic implantable cardioverter defibrillator in situ    Left-sided headache    Essential hypertension    Supraorbital neuralgia    Anxiety    Keratoconjunctivitis sicca of both eyes not specified as Sjogren's    Right carpal tunnel syndrome    Impaired mobility and ADLs    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    Headaches due to old head injury    Decreased ROM of neck    Bilateral carpal tunnel syndrome    Paroxysmal atrial fibrillation    Long term (current) use of anticoagulants    Impaired functional mobility, balance, gait, and endurance    Obstructive sleep apnea    Muscle weakness of lower extremity    Nonischemic cardiomyopathy from RV pacing, resolved with upgrade cardiac resynchronization therapy    History of DVT (deep vein thrombosis)    Depression, major, recurrent, moderate    Insomnia    Left atrial enlargement    History of TIA (transient ischemic attack)    Thyroid cyst    Hemispheric carotid artery syndrome    Thyroid nodule    Anemia due to vitamin B12 deficiency    Vitamin D deficiency    Trigger middle finger of left hand    Prediabetes    B12 deficiency    Acquired trigger finger of right middle finger    Leg edema    Non-compliance    Acute pain of left knee    Decreased range of motion of left knee    Headache    Mild neurocognitive disorder due to multiple etiologies    Epistaxis    Class 3 severe obesity due to excess calories with serious comorbidity and body mass index (BMI) of 45.0 to 49.9 in adult    Non-cardiac chest pain    Grief    Left leg weakness    History of stroke    Neutropenia, unspecified type    Aortic atherosclerosis     Hemiparesis affecting left side as late effect of stroke    Empty sella    Lower abdominal pain    Gastroenteritis    Elevated total protein    Multiple myeloma not having achieved remission   [2]   Current Outpatient Medications:     acetaminophen (TYLENOL) 500 MG tablet, Take 1 tablet (500 mg total) by mouth every 6 (six) hours as needed., Disp: 28 tablet, Rfl: 0    acyclovir (ZOVIRAX) 400 MG tablet, Take 1 tablet (400 mg total) by mouth 2 (two) times daily., Disp: 60 tablet, Rfl: 11    amLODIPine (NORVASC) 5 MG tablet, Take 5 mg by mouth., Disp: , Rfl:     ARIPiprazole (ABILIFY) 15 MG Tab, Take 15 mg by mouth., Disp: , Rfl:     benzonatate (TESSALON) 100 MG capsule, Take 1 capsule (100 mg total) by mouth 3 (three) times daily as needed., Disp: 30 capsule, Rfl: 0    carvediloL (COREG) 12.5 MG tablet, Take 1 tablet (12.5 mg total) by mouth 2 (two) times daily., Disp: 180 tablet, Rfl: 3    clonazePAM (KLONOPIN) 1 MG tablet, TAKE 1 TABLET BY MOUTH DAILY AS NEEDED FOR ANXIETY, Disp: 30 tablet, Rfl: 5    dexAMETHasone (DECADRON) 4 MG Tab, Take 10 tablets (40 mg total) by mouth every 7 days. Take with food., Disp: 40 tablet, Rfl: 5    dicyclomine (BENTYL) 10 MG capsule, TAKE 1 CAPSULE(10 MG) BY MOUTH TWICE DAILY AS NEEDED FOR ABDOMINAL CRAMPS, Disp: 30 capsule, Rfl: 0    ergocalciferol (ERGOCALCIFEROL) 50,000 unit Cap, Take 1 capsule (50,000 Units total) by mouth every 7 days., Disp: 12 capsule, Rfl: 3    furosemide (LASIX) 20 MG tablet, Take 1 tablet (20 mg total) by mouth daily as needed. Take as needed for weight gain (2-3 lbs/day or 5 lbs/week), shortness of breath, or leg swelling, Disp: 45 tablet, Rfl: 3    guaiFENesin (MUCINEX) 600 mg 12 hr tablet, Take 1 tablet (600 mg total) by mouth 2 (two) times daily. for 10 days, Disp: 20 tablet, Rfl: 0    lenalidomide 25 mg Cap, Take 1 capsule by mouth on days 1-21 of a 28 day cycle. Auth [id number] 6/5/25., Disp: 21 capsule, Rfl: 0    LIDOcaine (LIDODERM) 5 %, Place 1 patch  onto the skin once daily. Remove & Discard patch within 12 hours or as directed by MD, Disp: 15 patch, Rfl: 0    losartan (COZAAR) 100 MG tablet, Take 1 tablet (100 mg total) by mouth once daily., Disp: 90 tablet, Rfl: 3    omeprazole (PRILOSEC) 40 MG capsule, Take 1 capsule (40 mg total) by mouth once daily. Take 30 minutes before breakfast, Disp: 90 capsule, Rfl: 3    ondansetron (ZOFRAN-ODT) 4 MG TbDL, Dissolve 2 tablets (8 mg total) by mouth every 6 (six) hours as needed., Disp: 20 tablet, Rfl: 0    polyethylene glycol (GLYCOLAX) 17 gram PwPk, Take 17 g by mouth once daily., Disp: 20 each, Rfl: 12    potassium chloride SA (K-DUR,KLOR-CON) 20 MEQ tablet, Take 1 tablet (20 mEq total) by mouth once daily., Disp: 30 tablet, Rfl: 2    prochlorperazine (COMPAZINE) 5 MG tablet, Take 2 tablets (10 mg total) by mouth every 6 (six) hours as needed for Nausea., Disp: 20 tablet, Rfl: 5    rivaroxaban (XARELTO) 20 mg Tab, Take 1 tablet (20 mg total) by mouth daily with dinner or evening meal., Disp: 30 tablet, Rfl: 11    rosuvastatin (CRESTOR) 40 MG Tab, Take 1 tablet (40 mg total) by mouth every evening., Disp: 90 tablet, Rfl: 3    tiaGABine (GABITRIL) 4 MG tablet, Take 1 tablet (4 mg total) by mouth every evening., Disp: 30 tablet, Rfl: 12    tiZANidine (ZANAFLEX) 4 MG tablet, Take by mouth 2 (two) times daily., Disp: , Rfl:     zolpidem (AMBIEN) 10 mg Tab, Take 1 tablet (10 mg total) by mouth nightly as needed (insomnia)., Disp: 30 tablet, Rfl: 5    EPINEPHrine (EPIPEN) 0.3 mg/0.3 mL AtIn, Inject 0.3 mLs (0.3 mg total) into the muscle once. for 1 dose, Disp: 0.3 mL, Rfl: 1    Current Facility-Administered Medications:     cyanocobalamin tablet 100 mcg, 100 mcg, Oral, 1 time in Clinic/HOD,     onabotulinumtoxina injection 200 Units, 200 Units, Intramuscular, Q90 Days, David Cortez MD, 200 Units at 10/19/18 1713    onabotulinumtoxina injection 200 Units, 200 Units, Intramuscular, Q90 Days, David Cortez MD, 200 Units  at 12/28/18 1106    onabotulinumtoxina injection 200 Units, 200 Units, Intramuscular, Q90 Days, David Cortez MD, 200 Units at 03/13/19 1504    onabotulinumtoxina injection 200 Units, 200 Units, Intramuscular, Q90 Days, David Cortez MD, 200 Units at 05/23/19 1123    onabotulinumtoxina injection 200 Units, 200 Units, Intramuscular, Q90 Days, David Cortez MD, 200 Units at 10/11/19 1112    onabotulinumtoxina injection 200 Units, 200 Units, Intramuscular, Q90 Days, David Cortez MD, 200 Units at 12/19/19 1036    onabotulinumtoxina injection 200 Units, 200 Units, Intramuscular, Q10 weeks, David Cortez MD, 200 Units at 12/27/24 1030    onabotulinumtoxina injection 200 Units, 200 Units, Intramuscular, Q90 Days, David Cortez MD, 200 Units at 03/13/25 1441    Facility-Administered Medications Ordered in Other Visits:     lactated ringers infusion, , Intravenous, Continuous, Humberto Angel MD, Stopped at 02/11/20 0801    lidocaine (PF) 10 mg/ml (1%) injection 5 mg, 0.5 mL, Intradermal, Once, Humberto Angel MD

## 2025-06-30 ENCOUNTER — EXTERNAL CHRONIC CARE MANAGEMENT (OUTPATIENT)
Dept: PRIMARY CARE CLINIC | Facility: CLINIC | Age: 60
End: 2025-06-30
Payer: MEDICARE

## 2025-06-30 DIAGNOSIS — C90.00 MULTIPLE MYELOMA NOT HAVING ACHIEVED REMISSION: ICD-10-CM

## 2025-06-30 PROCEDURE — 99490 CHRNC CARE MGMT STAFF 1ST 20: CPT | Mod: S$GLB,,, | Performed by: INTERNAL MEDICINE

## 2025-06-30 RX ORDER — LENALIDOMIDE 25 MG/1
CAPSULE ORAL
Qty: 21 CAPSULE | Refills: 0 | Status: SHIPPED | OUTPATIENT
Start: 2025-06-30 | End: 2026-06-30

## 2025-07-03 ENCOUNTER — INFUSION (OUTPATIENT)
Dept: INFUSION THERAPY | Facility: HOSPITAL | Age: 60
End: 2025-07-03
Payer: MEDICARE

## 2025-07-03 VITALS
HEART RATE: 71 BPM | WEIGHT: 272.69 LBS | HEIGHT: 64 IN | OXYGEN SATURATION: 98 % | SYSTOLIC BLOOD PRESSURE: 165 MMHG | BODY MASS INDEX: 46.55 KG/M2 | TEMPERATURE: 98 F | RESPIRATION RATE: 18 BRPM | DIASTOLIC BLOOD PRESSURE: 93 MMHG

## 2025-07-03 DIAGNOSIS — C90.00 MULTIPLE MYELOMA NOT HAVING ACHIEVED REMISSION: Primary | ICD-10-CM

## 2025-07-03 PROCEDURE — 96401 CHEMO ANTI-NEOPL SQ/IM: CPT | Mod: HCNC

## 2025-07-03 PROCEDURE — 63600175 PHARM REV CODE 636 W HCPCS: Mod: HCNC

## 2025-07-03 RX ORDER — EPINEPHRINE 0.3 MG/.3ML
0.3 INJECTION SUBCUTANEOUS ONCE AS NEEDED
Status: DISCONTINUED | OUTPATIENT
Start: 2025-07-03 | End: 2025-07-03 | Stop reason: HOSPADM

## 2025-07-03 RX ORDER — BORTEZOMIB 3.5 MG/1
1.3 INJECTION, POWDER, LYOPHILIZED, FOR SOLUTION INTRAVENOUS; SUBCUTANEOUS
Status: COMPLETED | OUTPATIENT
Start: 2025-07-03 | End: 2025-07-03

## 2025-07-03 RX ORDER — DIPHENHYDRAMINE HYDROCHLORIDE 50 MG/ML
50 INJECTION, SOLUTION INTRAMUSCULAR; INTRAVENOUS ONCE AS NEEDED
Status: DISCONTINUED | OUTPATIENT
Start: 2025-07-03 | End: 2025-07-03 | Stop reason: HOSPADM

## 2025-07-03 RX ADMIN — BORTEZOMIB 3.25 MG: 3.5 INJECTION, POWDER, LYOPHILIZED, FOR SOLUTION INTRAVENOUS; SUBCUTANEOUS at 11:07

## 2025-07-10 ENCOUNTER — INFUSION (OUTPATIENT)
Dept: INFUSION THERAPY | Facility: HOSPITAL | Age: 60
End: 2025-07-10
Payer: MEDICARE

## 2025-07-10 ENCOUNTER — CLINICAL SUPPORT (OUTPATIENT)
Dept: CARDIOLOGY | Facility: HOSPITAL | Age: 60
End: 2025-07-10
Payer: MEDICARE

## 2025-07-10 ENCOUNTER — CLINICAL SUPPORT (OUTPATIENT)
Dept: CARDIOLOGY | Facility: HOSPITAL | Age: 60
End: 2025-07-10
Attending: INTERNAL MEDICINE
Payer: MEDICARE

## 2025-07-10 VITALS
BODY MASS INDEX: 46.55 KG/M2 | WEIGHT: 272.69 LBS | DIASTOLIC BLOOD PRESSURE: 75 MMHG | SYSTOLIC BLOOD PRESSURE: 158 MMHG | RESPIRATION RATE: 18 BRPM | OXYGEN SATURATION: 96 % | HEART RATE: 77 BPM | HEIGHT: 64 IN

## 2025-07-10 DIAGNOSIS — Z95.810 PRESENCE OF AUTOMATIC (IMPLANTABLE) CARDIAC DEFIBRILLATOR: ICD-10-CM

## 2025-07-10 DIAGNOSIS — I44.2 ATRIOVENTRICULAR BLOCK, COMPLETE: ICD-10-CM

## 2025-07-10 DIAGNOSIS — I42.8 OTHER CARDIOMYOPATHIES: ICD-10-CM

## 2025-07-10 DIAGNOSIS — C90.00 MULTIPLE MYELOMA NOT HAVING ACHIEVED REMISSION: Primary | ICD-10-CM

## 2025-07-10 PROCEDURE — 93296 REM INTERROG EVL PM/IDS: CPT | Mod: HCNC | Performed by: INTERNAL MEDICINE

## 2025-07-10 PROCEDURE — 93295 DEV INTERROG REMOTE 1/2/MLT: CPT | Mod: HCNC,,, | Performed by: INTERNAL MEDICINE

## 2025-07-10 PROCEDURE — 96401 CHEMO ANTI-NEOPL SQ/IM: CPT | Mod: HCNC

## 2025-07-10 PROCEDURE — 63600175 PHARM REV CODE 636 W HCPCS: Mod: JZ,TB,HCNC

## 2025-07-10 RX ORDER — SODIUM CHLORIDE 0.9 % (FLUSH) 0.9 %
10 SYRINGE (ML) INJECTION
Status: DISCONTINUED | OUTPATIENT
Start: 2025-07-10 | End: 2025-07-10 | Stop reason: HOSPADM

## 2025-07-10 RX ORDER — HEPARIN 100 UNIT/ML
500 SYRINGE INTRAVENOUS
Status: DISCONTINUED | OUTPATIENT
Start: 2025-07-10 | End: 2025-07-10 | Stop reason: HOSPADM

## 2025-07-10 RX ORDER — DIPHENHYDRAMINE HYDROCHLORIDE 50 MG/ML
50 INJECTION, SOLUTION INTRAMUSCULAR; INTRAVENOUS ONCE AS NEEDED
Status: DISCONTINUED | OUTPATIENT
Start: 2025-07-10 | End: 2025-07-10 | Stop reason: HOSPADM

## 2025-07-10 RX ORDER — PROCHLORPERAZINE EDISYLATE 5 MG/ML
10 INJECTION INTRAMUSCULAR; INTRAVENOUS ONCE AS NEEDED
Status: DISCONTINUED | OUTPATIENT
Start: 2025-07-10 | End: 2025-07-10 | Stop reason: HOSPADM

## 2025-07-10 RX ORDER — EPINEPHRINE 0.3 MG/.3ML
0.3 INJECTION SUBCUTANEOUS ONCE AS NEEDED
Status: DISCONTINUED | OUTPATIENT
Start: 2025-07-10 | End: 2025-07-10 | Stop reason: HOSPADM

## 2025-07-10 RX ORDER — BORTEZOMIB 3.5 MG/1
1.3 INJECTION, POWDER, LYOPHILIZED, FOR SOLUTION INTRAVENOUS; SUBCUTANEOUS
Status: COMPLETED | OUTPATIENT
Start: 2025-07-10 | End: 2025-07-10

## 2025-07-10 RX ADMIN — DARATUMUMAB AND HYALURONIDASE-FIHJ (HUMAN RECOMBINANT) 1800 MG: 1800; 30000 INJECTION SUBCUTANEOUS at 11:07

## 2025-07-10 RX ADMIN — BORTEZOMIB 3.25 MG: 3.5 INJECTION, POWDER, LYOPHILIZED, FOR SOLUTION INTRAVENOUS; SUBCUTANEOUS at 11:07

## 2025-07-10 NOTE — PLAN OF CARE
Pt here for Manisha/Velcade, VSS, assessment complete. Manisha and Velcade SQ injections administered to abdomen. Tolerated well. NAD.

## 2025-07-15 ENCOUNTER — TELEPHONE (OUTPATIENT)
Dept: HEMATOLOGY/ONCOLOGY | Facility: CLINIC | Age: 60
End: 2025-07-15
Payer: MEDICARE

## 2025-07-15 LAB
OHS CV AF BURDEN PERCENT: < 1
OHS CV BIV PACING PERCENT: 93 %
OHS CV DC REMOTE DEVICE TYPE: NORMAL
OHS CV ICD SHOCK: NO
OHS CV RV PACING PERCENT: 86 %

## 2025-07-17 ENCOUNTER — INFUSION (OUTPATIENT)
Dept: INFUSION THERAPY | Facility: HOSPITAL | Age: 60
End: 2025-07-17
Payer: MEDICARE

## 2025-07-17 VITALS
WEIGHT: 272.69 LBS | SYSTOLIC BLOOD PRESSURE: 136 MMHG | DIASTOLIC BLOOD PRESSURE: 65 MMHG | RESPIRATION RATE: 16 BRPM | HEART RATE: 59 BPM | BODY MASS INDEX: 46.81 KG/M2 | OXYGEN SATURATION: 99 %

## 2025-07-17 DIAGNOSIS — C90.00 MULTIPLE MYELOMA NOT HAVING ACHIEVED REMISSION: Primary | ICD-10-CM

## 2025-07-17 PROCEDURE — 63600175 PHARM REV CODE 636 W HCPCS: Mod: HCNC

## 2025-07-17 PROCEDURE — 96401 CHEMO ANTI-NEOPL SQ/IM: CPT | Mod: HCNC

## 2025-07-17 RX ORDER — DIPHENHYDRAMINE HYDROCHLORIDE 50 MG/ML
50 INJECTION, SOLUTION INTRAMUSCULAR; INTRAVENOUS ONCE AS NEEDED
Status: DISCONTINUED | OUTPATIENT
Start: 2025-07-17 | End: 2025-07-17 | Stop reason: HOSPADM

## 2025-07-17 RX ORDER — PROCHLORPERAZINE EDISYLATE 5 MG/ML
10 INJECTION INTRAMUSCULAR; INTRAVENOUS ONCE AS NEEDED
Status: DISCONTINUED | OUTPATIENT
Start: 2025-07-17 | End: 2025-07-17 | Stop reason: HOSPADM

## 2025-07-17 RX ORDER — EPINEPHRINE 0.3 MG/.3ML
0.3 INJECTION SUBCUTANEOUS ONCE AS NEEDED
Status: DISCONTINUED | OUTPATIENT
Start: 2025-07-17 | End: 2025-07-17 | Stop reason: HOSPADM

## 2025-07-17 RX ORDER — BORTEZOMIB 3.5 MG/1
1.3 INJECTION, POWDER, LYOPHILIZED, FOR SOLUTION INTRAVENOUS; SUBCUTANEOUS
Status: COMPLETED | OUTPATIENT
Start: 2025-07-17 | End: 2025-07-17

## 2025-07-17 RX ADMIN — BORTEZOMIB 3.25 MG: 3.5 INJECTION, POWDER, LYOPHILIZED, FOR SOLUTION INTRAVENOUS; SUBCUTANEOUS at 09:07

## 2025-07-18 ENCOUNTER — HOSPITAL ENCOUNTER (OUTPATIENT)
Dept: RADIOLOGY | Facility: HOSPITAL | Age: 60
Discharge: HOME | End: 2025-07-18
Attending: NURSE PRACTITIONER
Payer: MEDICARE

## 2025-07-18 DIAGNOSIS — R05.9 COUGH, UNSPECIFIED TYPE: ICD-10-CM

## 2025-07-18 DIAGNOSIS — M54.6 ACUTE LEFT-SIDED THORACIC BACK PAIN: ICD-10-CM

## 2025-07-18 DIAGNOSIS — C90.00 MULTIPLE MYELOMA NOT HAVING ACHIEVED REMISSION: ICD-10-CM

## 2025-07-18 PROCEDURE — 71250 CT THORAX DX C-: CPT | Mod: TC,HCNC

## 2025-07-18 PROCEDURE — 71250 CT THORAX DX C-: CPT | Mod: 26,HCNC,, | Performed by: STUDENT IN AN ORGANIZED HEALTH CARE EDUCATION/TRAINING PROGRAM

## 2025-07-18 NOTE — H&P (VIEW-ONLY)
Section of Hematology and Stem Cell Transplantation  Follow-Up Note     07/24/2025  Primary Oncologic Diagnosis: Multiple myeloma not having achieved remission [C90.00]    History of Present Ilness:   Amna Chawla (Alisa) is a pleasant 60 y.o.female from [place], [place], here for follow up of newly diagnosed myeloma. Her co-morbidities include CHB c/b cardiac arrest s/p CRT-D, AFib on Xarelto, HFrEF with improved EF (LVEF 50-55%), prior CVA, HTN, depression, SHIRA on CPAP.     Oncologic History:   She was hospitalized from 1/29 - 2/02 with left upper quadrant pain and was found to have small bowel enteritis. She improved with antibiotics and symptomatic care. During hospitalization, myeloma labs were ordered to assess elevated protein gap. She was found to have IgG lambda with M protein of 3.43 g/dL and subsequently referred to Hematology clinic. Since hospitalization, she has been feeling overall well. She denies fevers, night sweats, weight loss, bone pain.   2/2/2025: Elevated M protein noted 3.43 g/dL with ANALI showing IgG lambda, normal FLC, mild anemia  Pathology:  Bone marrow biopsy  done on 3/10/25. 60% clonal plasma cells. FISH pending.  4/2/24: PET CT with no hypermetabolic lesions concerning for active disease.  3/27/25: initiated lion-VRd    Interval History 07/24/2025:  Patient here for follow up prior to cycle 5 of lion-VRd. Her restaging is planned for 7/30/25 and follow up with Dr. Porter. She reports that the bottom of her feet are getting numb and there are some dark spots noted, no swelling. She notices a runny nose for 2 days after her treatment but no other notable symptoms. Denies fever, chills, chest pain, shortness of breath, new/worsening bone pain, adenopathy, N/V/D.     Past Medical History, Social History, and Past Family History are unchanged since last evaluation except for HPI.    Past Medical History:   Diagnosis Date    Anticoagulant long-term use     Anxiety     Asthma     Atrial  fibrillation     Brain anoxic injury     Cervicalgia 8/28/2014    CHI (closed head injury) 2/19/2013    Convulsion 5/30/2015    Decreased ROM of left shoulder 4/12/2017    Defibrillator activation 2013    Depression     Heart block     History of sudden cardiac arrest 2/2013    PEA arrest with subsequent long-QT    Hx of psychiatric care     Hypertension     Left atrial enlargement 4/11/2018    Pacemaker 2013    Paresthesia 11/1/2013    Prolonged Q-T interval on ECG 2/8/2013    Psychiatric problem     Seizures     Sleep difficulties     Stroke     weakness lt side    Therapy     Thyroid disease     Upper airway resistance syndrome 2/21/2017        CURRENT MEDICATIONS:   Current Outpatient Medications   Medication Sig    acyclovir (ZOVIRAX) 400 MG tablet Take 1 tablet (400 mg total) by mouth 2 (two) times daily.    amLODIPine (NORVASC) 5 MG tablet Take 5 mg by mouth.    ARIPiprazole (ABILIFY) 15 MG Tab Take 15 mg by mouth.    carvediloL (COREG) 12.5 MG tablet Take 1 tablet (12.5 mg total) by mouth 2 (two) times daily.    clonazePAM (KLONOPIN) 1 MG tablet TAKE 1 TABLET BY MOUTH DAILY AS NEEDED FOR ANXIETY    dexAMETHasone (DECADRON) 4 MG Tab Take 10 tablets (40 mg total) by mouth every 7 days. Take with food.    dicyclomine (BENTYL) 10 MG capsule TAKE 1 CAPSULE(10 MG) BY MOUTH TWICE DAILY AS NEEDED FOR ABDOMINAL CRAMPS    EPINEPHrine (EPIPEN) 0.3 mg/0.3 mL AtIn Inject 0.3 mLs (0.3 mg total) into the muscle once. for 1 dose    ergocalciferol (ERGOCALCIFEROL) 50,000 unit Cap Take 1 capsule (50,000 Units total) by mouth every 7 days.    furosemide (LASIX) 20 MG tablet Take 1 tablet (20 mg total) by mouth daily as needed. Take as needed for weight gain (2-3 lbs/day or 5 lbs/week), shortness of breath, or leg swelling    lenalidomide 25 mg Cap Take 1 capsule by mouth on days 1-21 of a 28 day cycle. [id number]/30/[facility].    LIDOcaine (LIDODERM) 5 % Place 1 patch onto the skin once daily. Remove & Discard patch within 12  hours or as directed by MD    losartan (COZAAR) 100 MG tablet Take 1 tablet (100 mg total) by mouth once daily.    omeprazole (PRILOSEC) 40 MG capsule Take 1 capsule (40 mg total) by mouth once daily. Take 30 minutes before breakfast    ondansetron (ZOFRAN-ODT) 4 MG TbDL Dissolve 2 tablets (8 mg total) by mouth every 6 (six) hours as needed.    polyethylene glycol (GLYCOLAX) 17 gram PwPk Take 17 g by mouth once daily.    potassium chloride SA (K-DUR,KLOR-CON) 20 MEQ tablet Take 1 tablet (20 mEq total) by mouth once daily.    prochlorperazine (COMPAZINE) 5 MG tablet Take 2 tablets (10 mg total) by mouth every 6 (six) hours as needed for Nausea.    rivaroxaban (XARELTO) 20 mg Tab Take 1 tablet (20 mg total) by mouth daily with dinner or evening meal.    rosuvastatin (CRESTOR) 40 MG Tab Take 1 tablet (40 mg total) by mouth every evening.    tiaGABine (GABITRIL) 4 MG tablet Take 1 tablet (4 mg total) by mouth every evening.    tiZANidine (ZANAFLEX) 4 MG tablet Take by mouth 2 (two) times daily.    zolpidem (AMBIEN) 10 mg Tab Take 1 tablet (10 mg total) by mouth nightly as needed (insomnia).     Current Facility-Administered Medications   Medication    cyanocobalamin tablet 100 mcg    onabotulinumtoxina injection 200 Units    onabotulinumtoxina injection 200 Units    onabotulinumtoxina injection 200 Units    onabotulinumtoxina injection 200 Units    onabotulinumtoxina injection 200 Units    onabotulinumtoxina injection 200 Units    onabotulinumtoxina injection 200 Units    onabotulinumtoxina injection 200 Units     Facility-Administered Medications Ordered in Other Visits   Medication    lactated ringers infusion    lidocaine (PF) 10 mg/ml (1%) injection 5 mg       ALLERGIES:   Review of patient's allergies indicates:   Allergen Reactions    Aspirin Hives    Imitrex [sumatriptan] Palpitations    Penicillins Hives and Swelling    Shellfish containing products Anaphylaxis     seafood    Reglan [metoclopramide hcl] Other  (See Comments)     Parkinsonism        Review of Systems:     Review of Systems   Constitutional:  Negative for chills, fever, malaise/fatigue and weight loss.   HENT:  Negative for congestion, nosebleeds, sinus pain and sore throat.    Eyes:  Negative for blurred vision, double vision, photophobia and pain.   Respiratory:  Negative for cough and shortness of breath.    Cardiovascular:  Negative for chest pain and palpitations.   Gastrointestinal:  Negative for constipation, diarrhea, heartburn, nausea and vomiting.   Genitourinary:  Negative for dysuria and hematuria.   Musculoskeletal:  Negative for back pain and falls.   Skin:  Negative for rash.   Neurological:  Negative for dizziness, sensory change, weakness and headaches.   Endo/Heme/Allergies:  Negative for environmental allergies.   Psychiatric/Behavioral:  The patient does not have insomnia.        Physical Exam:     Vitals:    07/24/25 1014   BP: (!) 160/73   Pulse: 75   Temp: 98.3 °F (36.8 °C)         Physical Exam  Vitals reviewed.   Constitutional:       General: She is not in acute distress.     Appearance: Normal appearance. She is obese. She is not ill-appearing.   HENT:      Head: Normocephalic and atraumatic.      Nose: Nose normal.      Mouth/Throat:      Mouth: Mucous membranes are moist.      Pharynx: Oropharynx is clear. No oropharyngeal exudate.   Eyes:      Pupils: Pupils are equal, round, and reactive to light.   Cardiovascular:      Rate and Rhythm: Normal rate and regular rhythm.      Heart sounds: No murmur heard.  Pulmonary:      Effort: Pulmonary effort is normal.      Breath sounds: Normal breath sounds. No wheezing.   Abdominal:      General: Bowel sounds are normal.      Palpations: Abdomen is soft.      Tenderness: There is no abdominal tenderness.   Musculoskeletal:         General: Normal range of motion.      Cervical back: Normal range of motion and neck supple.      Right lower leg: No edema.      Left lower leg: No edema.    Skin:     General: Skin is warm and dry.      Capillary Refill: Capillary refill takes less than 2 seconds.      Findings: No bruising, lesion or rash.   Neurological:      Mental Status: She is alert and oriented to person, place, and time.      Motor: No weakness.   Psychiatric:         Mood and Affect: Mood normal.         Behavior: Behavior normal.         Thought Content: Thought content normal.        ECOG Performance Status: (foot note - ECOG PS provided by Eastern Cooperative Oncology Group) 1 - Symptomatic but completely ambulatory    Karnofsky Performance Score:  90%- Able to Carry on Normal Activity: Minor Symptoms of Disease      Labs:   Lab Results   Component Value Date    WBC 4.12 2025    HGB 10.8 (L) 2025    HCT 31.7 (L) 2025    MCV 85 2025     2025       Lab Results   Component Value Date     2025    K 3.1 (L) 2025     (H) 2025    CO2 26 2025    BUN 10 2025    CREATININE 0.7 2025    ALBUMIN 3.3 (L) 2025    BILITOT 0.6 2025    ALKPHOS 55 2025    AST 10 (L) 2025    ALT 13 2025       Imagin/2/25 WB PET CT  Impression:  History of recently diagnosed myeloma.  No hypermetabolic lesions concerning for active disease.  Nonspecific hypermetabolic lymph nodes in the head and neck.  Attention on follow-up.    Pathology:  3/10/25 Bone Marrow Biopsy  RIGHT ILIAC CREST BONE MARROW ASPIRATE, BONE MARROW CLOT, AND BONE MARROW CORE BIOPSY WITH:     CELLULARITY= 70-80%, TRILINEAGE HEMATOPOIETIC ACTIVITY (M/E= 2.9:1).   PLASMA CELL NEOPLASM (65%).  SEE COMMENT.   GRADE I RETICULAR FIBROSIS.   STORAGE IRON NOT IDENTIFIED.   CONGO RED NEGATIVE.   ADEQUATE NUMBER OF MEGAKARYOCYTES.        Assessment and Plan:     Multiple myeloma not having achieved remission  - IgG lambda multiple myeloma, standard-risk by FISH, R-ISS Stage II  - Pre-treatment labs: m-protein 3.43 g/dL, lambda sFLC 6.43  - Normal  calcium and renal function  - Patient is not a transplant candidate due to extensive cardiac history, per Dr. De La Garza  - 3/27/25: initiated lion-VRd, 25 mg lenalidomide days 1-21 and dexamethasone 40 mg weekly, tolerating well  - 6/17/25 biochemical studies with continued decrease in m-spike with VGPR prior to cycle 4  - Biochemical studies pending today, will contact patient with results.  - Cycle 4, day 1 held for infectious symptoms, resume C4D1 on 6/26/25  - Plan for repeat BMBx and PET scan restaging on 7/30/25  - Contact me with any uncontrolled symptoms.    Chemotherapy Induced Neuropathy  - Bilateral foot numbness, dose-reduce velcade to 1 mg/m2 for neuropathy    Immunodeficiency due to drugs  - Secondary to treatment, continue acyclovir two times daily for shingles prophylaxis    Other thrombophilia  - Secondary to MM and revlimid, continue xarelto daily for VTE prophylaxis    Anemia in neoplastic disease  - Secondary to multiple myeloma, stable, asymptomatic    Chemo Induced Diarrhea  - Mild, manageable diarrhea with treatment, no problems today    Vitamin D deficiency  - Noted to be deficient on recent labs  - Continue ergocalciferol 50,000 units weekly    Obesity  - Long-standing, multiple chronic co-morbidities  - Ambulatory, no difficulty performing ADLs    Hypokalemia  - Mild, secondary to altered taste  - Continue potassium supplement 20 mEq daily two times daily for next 30 days    Altered Taste  - Secondary to chemotherapy, weight is overall stable  - [facility] dietary handout provided for taste alteration  - Offered dietician consult but likely not to help with this type of alteration       BMT Chart Routing      Follow up with physician    Follow up with FERNANDO 2 months. Shannon: MM follow up   Provider visit type    Infusion scheduling note    Injection scheduling note    Labs CBC, CMP, free light chains, immunofixation, immunoglobulins and SPEP   Scheduling:  Preferred lab:  Lab interval:  [facility]: around 9 am,  2-3 days before follow up   Imaging    Pharmacy appointment    Other referrals                 Orders Placed:      Orders Placed This Encounter    CBC Auto Differential    Comprehensive Metabolic Panel    Immunofixation, Serum    Immunoglobulin Free LT Chains Blood    Immunoglobulins (IgG, IgA, IgM) Quantitative    Protein Electrophoresis, Serum       Total time of this visit was 31 minutes, including time spent face to face with patient and/or via video/audio, and also in preparing for today's visit for MDM and documentation. (Medical Decision Making, including consideration of possible diagnoses, management options, complex medical record review, review of diagnostic tests and information, consideration and discussion of significant complications based on comorbidities, and discussion with providers involved with the care of the patient). Greater than 50% was spent face to face with the patient counseling and coordinating care.    Visit today included increased complexity associated with the care of the episodic problem neuropathy addressed and managing the longitudinal care of the patient due to the serious and/or complex managed problem(s) multiple myeloma.     Thank you for allowing me to partake in the care of this patient. If there are any questions, please do not hesitate to reach out.    Shannon Culver, ALFREDOP-C  Hematologic Malignancies, Stem Cell Transplantation, and Cellular Therapy  [facility]

## 2025-07-18 NOTE — PROGRESS NOTES
Section of Hematology and Stem Cell Transplantation  Follow-Up Note     07/24/2025  Primary Oncologic Diagnosis: Multiple myeloma not having achieved remission [C90.00]    History of Present Ilness:   Amna Chawla (Alisa) is a pleasant 60 y.o.female from [place], [place], here for follow up of newly diagnosed myeloma. Her co-morbidities include CHB c/b cardiac arrest s/p CRT-D, AFib on Xarelto, HFrEF with improved EF (LVEF 50-55%), prior CVA, HTN, depression, SHIRA on CPAP.     Oncologic History:   She was hospitalized from 1/29 - 2/02 with left upper quadrant pain and was found to have small bowel enteritis. She improved with antibiotics and symptomatic care. During hospitalization, myeloma labs were ordered to assess elevated protein gap. She was found to have IgG lambda with M protein of 3.43 g/dL and subsequently referred to Hematology clinic. Since hospitalization, she has been feeling overall well. She denies fevers, night sweats, weight loss, bone pain.   2/2/2025: Elevated M protein noted 3.43 g/dL with ANALI showing IgG lambda, normal FLC, mild anemia  Pathology:  Bone marrow biopsy  done on 3/10/25. 60% clonal plasma cells. FISH pending.  4/2/24: PET CT with no hypermetabolic lesions concerning for active disease.  3/27/25: initiated lion-VRd    Interval History 07/24/2025:  Patient here for follow up prior to cycle 5 of lion-VRd. Her restaging is planned for 7/30/25 and follow up with Dr. Porter. She reports that the bottom of her feet are getting numb and there are some dark spots noted, no swelling. She notices a runny nose for 2 days after her treatment but no other notable symptoms. Denies fever, chills, chest pain, shortness of breath, new/worsening bone pain, adenopathy, N/V/D.     Past Medical History, Social History, and Past Family History are unchanged since last evaluation except for HPI.    Past Medical History:   Diagnosis Date    Anticoagulant long-term use     Anxiety     Asthma     Atrial  fibrillation     Brain anoxic injury     Cervicalgia 8/28/2014    CHI (closed head injury) 2/19/2013    Convulsion 5/30/2015    Decreased ROM of left shoulder 4/12/2017    Defibrillator activation 2013    Depression     Heart block     History of sudden cardiac arrest 2/2013    PEA arrest with subsequent long-QT    Hx of psychiatric care     Hypertension     Left atrial enlargement 4/11/2018    Pacemaker 2013    Paresthesia 11/1/2013    Prolonged Q-T interval on ECG 2/8/2013    Psychiatric problem     Seizures     Sleep difficulties     Stroke     weakness lt side    Therapy     Thyroid disease     Upper airway resistance syndrome 2/21/2017        CURRENT MEDICATIONS:   Current Outpatient Medications   Medication Sig    acyclovir (ZOVIRAX) 400 MG tablet Take 1 tablet (400 mg total) by mouth 2 (two) times daily.    amLODIPine (NORVASC) 5 MG tablet Take 5 mg by mouth.    ARIPiprazole (ABILIFY) 15 MG Tab Take 15 mg by mouth.    carvediloL (COREG) 12.5 MG tablet Take 1 tablet (12.5 mg total) by mouth 2 (two) times daily.    clonazePAM (KLONOPIN) 1 MG tablet TAKE 1 TABLET BY MOUTH DAILY AS NEEDED FOR ANXIETY    dexAMETHasone (DECADRON) 4 MG Tab Take 10 tablets (40 mg total) by mouth every 7 days. Take with food.    dicyclomine (BENTYL) 10 MG capsule TAKE 1 CAPSULE(10 MG) BY MOUTH TWICE DAILY AS NEEDED FOR ABDOMINAL CRAMPS    EPINEPHrine (EPIPEN) 0.3 mg/0.3 mL AtIn Inject 0.3 mLs (0.3 mg total) into the muscle once. for 1 dose    ergocalciferol (ERGOCALCIFEROL) 50,000 unit Cap Take 1 capsule (50,000 Units total) by mouth every 7 days.    furosemide (LASIX) 20 MG tablet Take 1 tablet (20 mg total) by mouth daily as needed. Take as needed for weight gain (2-3 lbs/day or 5 lbs/week), shortness of breath, or leg swelling    lenalidomide 25 mg Cap Take 1 capsule by mouth on days 1-21 of a 28 day cycle. [facility] [id number] 6/30/25.    LIDOcaine (LIDODERM) 5 % Place 1 patch onto the skin once daily. Remove & Discard patch within 12  hours or as directed by MD    losartan (COZAAR) 100 MG tablet Take 1 tablet (100 mg total) by mouth once daily.    omeprazole (PRILOSEC) 40 MG capsule Take 1 capsule (40 mg total) by mouth once daily. Take 30 minutes before breakfast    ondansetron (ZOFRAN-ODT) 4 MG TbDL Dissolve 2 tablets (8 mg total) by mouth every 6 (six) hours as needed.    polyethylene glycol (GLYCOLAX) 17 gram PwPk Take 17 g by mouth once daily.    potassium chloride SA (K-DUR,KLOR-CON) 20 MEQ tablet Take 1 tablet (20 mEq total) by mouth once daily.    prochlorperazine (COMPAZINE) 5 MG tablet Take 2 tablets (10 mg total) by mouth every 6 (six) hours as needed for Nausea.    rivaroxaban (XARELTO) 20 mg Tab Take 1 tablet (20 mg total) by mouth daily with dinner or evening meal.    rosuvastatin (CRESTOR) 40 MG Tab Take 1 tablet (40 mg total) by mouth every evening.    tiaGABine (GABITRIL) 4 MG tablet Take 1 tablet (4 mg total) by mouth every evening.    tiZANidine (ZANAFLEX) 4 MG tablet Take by mouth 2 (two) times daily.    zolpidem (AMBIEN) 10 mg Tab Take 1 tablet (10 mg total) by mouth nightly as needed (insomnia).     Current Facility-Administered Medications   Medication    cyanocobalamin tablet 100 mcg    onabotulinumtoxina injection 200 Units    onabotulinumtoxina injection 200 Units    onabotulinumtoxina injection 200 Units    onabotulinumtoxina injection 200 Units    onabotulinumtoxina injection 200 Units    onabotulinumtoxina injection 200 Units    onabotulinumtoxina injection 200 Units    onabotulinumtoxina injection 200 Units     Facility-Administered Medications Ordered in Other Visits   Medication    lactated ringers infusion    lidocaine (PF) 10 mg/ml (1%) injection 5 mg       ALLERGIES:   Review of patient's allergies indicates:   Allergen Reactions    Aspirin Hives    Imitrex [sumatriptan] Palpitations    Penicillins Hives and Swelling    Shellfish containing products Anaphylaxis     seafood    Reglan [metoclopramide hcl] Other  (See Comments)     Parkinsonism        Review of Systems:     Review of Systems   Constitutional:  Negative for chills, fever, malaise/fatigue and weight loss.   HENT:  Negative for congestion, nosebleeds, sinus pain and sore throat.    Eyes:  Negative for blurred vision, double vision, photophobia and pain.   Respiratory:  Negative for cough and shortness of breath.    Cardiovascular:  Negative for chest pain and palpitations.   Gastrointestinal:  Negative for constipation, diarrhea, heartburn, nausea and vomiting.   Genitourinary:  Negative for dysuria and hematuria.   Musculoskeletal:  Negative for back pain and falls.   Skin:  Negative for rash.   Neurological:  Negative for dizziness, sensory change, weakness and headaches.   Endo/Heme/Allergies:  Negative for environmental allergies.   Psychiatric/Behavioral:  The patient does not have insomnia.        Physical Exam:     Vitals:    07/24/25 1014   BP: (!) 160/73   Pulse: 75   Temp: 98.3 °F (36.8 °C)         Physical Exam  Vitals reviewed.   Constitutional:       General: She is not in acute distress.     Appearance: Normal appearance. She is obese. She is not ill-appearing.   HENT:      Head: Normocephalic and atraumatic.      Nose: Nose normal.      Mouth/Throat:      Mouth: Mucous membranes are moist.      Pharynx: Oropharynx is clear. No oropharyngeal exudate.   Eyes:      Pupils: Pupils are equal, round, and reactive to light.   Cardiovascular:      Rate and Rhythm: Normal rate and regular rhythm.      Heart sounds: No murmur heard.  Pulmonary:      Effort: Pulmonary effort is normal.      Breath sounds: Normal breath sounds. No wheezing.   Abdominal:      General: Bowel sounds are normal.      Palpations: Abdomen is soft.      Tenderness: There is no abdominal tenderness.   Musculoskeletal:         General: Normal range of motion.      Cervical back: Normal range of motion and neck supple.      Right lower leg: No edema.      Left lower leg: No edema.    Skin:     General: Skin is warm and dry.      Capillary Refill: Capillary refill takes less than 2 seconds.      Findings: No bruising, lesion or rash.   Neurological:      Mental Status: She is alert and oriented to person, place, and time.      Motor: No weakness.   Psychiatric:         Mood and Affect: Mood normal.         Behavior: Behavior normal.         Thought Content: Thought content normal.        ECOG Performance Status: (foot note - ECOG PS provided by Eastern Cooperative Oncology Group) 1 - Symptomatic but completely ambulatory    Karnofsky Performance Score:  90%- Able to Carry on Normal Activity: Minor Symptoms of Disease      Labs:   Lab Results   Component Value Date    WBC 4.12 2025    HGB 10.8 (L) 2025    HCT 31.7 (L) 2025    MCV 85 2025     2025       Lab Results   Component Value Date     2025    K 3.1 (L) 2025     (H) 2025    CO2 26 2025    BUN 10 2025    CREATININE 0.7 2025    ALBUMIN 3.3 (L) 2025    BILITOT 0.6 2025    ALKPHOS 55 2025    AST 10 (L) 2025    ALT 13 2025       Imagin/2/25 WB PET CT  Impression:  History of recently diagnosed myeloma.  No hypermetabolic lesions concerning for active disease.  Nonspecific hypermetabolic lymph nodes in the head and neck.  Attention on follow-up.    Pathology:  3/10/25 Bone Marrow Biopsy  RIGHT ILIAC CREST BONE MARROW ASPIRATE, BONE MARROW CLOT, AND BONE MARROW CORE BIOPSY WITH:     CELLULARITY= 70-80%, TRILINEAGE HEMATOPOIETIC ACTIVITY (M/E= 2.9:1).   PLASMA CELL NEOPLASM (65%).  SEE COMMENT.   GRADE I RETICULAR FIBROSIS.   STORAGE IRON NOT IDENTIFIED.   CONGO RED NEGATIVE.   ADEQUATE NUMBER OF MEGAKARYOCYTES.        Assessment and Plan:     Multiple myeloma not having achieved remission  - IgG lambda multiple myeloma, standard-risk by FISH, R-ISS Stage II  - Pre-treatment labs: m-protein 3.43 g/dL, lambda sFLC 6.43  - Normal  calcium and renal function  - Patient is not a transplant candidate due to extensive cardiac history, per Dr. De La Garza  - 3/27/25: initiated lion-VRd, 25 mg lenalidomide days 1-21 and dexamethasone 40 mg weekly, tolerating well  - 6/17/25 biochemical studies with continued decrease in m-spike with VGPR prior to cycle 4  - Biochemical studies pending today, will contact patient with results.  - Cycle 4, day 1 held for infectious symptoms, resume C4D1 on 6/26/25  - Plan for repeat BMBx and PET scan restaging on 7/30/25  - Contact me with any uncontrolled symptoms.    Chemotherapy Induced Neuropathy  - Bilateral foot numbness, dose-reduce velcade to 1 mg/m2 for neuropathy    Immunodeficiency due to drugs  - Secondary to treatment, continue acyclovir two times daily for shingles prophylaxis    Other thrombophilia  - Secondary to MM and revlimid, continue xarelto daily for VTE prophylaxis    Anemia in neoplastic disease  - Secondary to multiple myeloma, stable, asymptomatic    Chemo Induced Diarrhea  - Mild, manageable diarrhea with treatment, no problems today    Vitamin D deficiency  - Noted to be deficient on recent labs  - Continue ergocalciferol 50,000 units weekly    Obesity  - Long-standing, multiple chronic co-morbidities  - Ambulatory, no difficulty performing ADLs    Hypokalemia  - Mild, secondary to altered taste  - Continue potassium supplement 20 mEq daily two times daily for next 30 days    Altered Taste  - Secondary to chemotherapy, weight is overall stable  - [facility] dietary handout provided for taste alteration  - Offered dietician consult but likely not to help with this type of alteration       BMT Chart Routing      Follow up with physician    Follow up with FERNANDO 2 months. Shannon: MM follow up   Provider visit type    Infusion scheduling note    Injection scheduling note    Labs CBC, CMP, free light chains, immunofixation, immunoglobulins and SPEP   Scheduling:  Preferred lab:  Lab interval:  [facility]: around 9 am,  2-3 days before follow up   Imaging    Pharmacy appointment    Other referrals                 Orders Placed:      Orders Placed This Encounter    CBC Auto Differential    Comprehensive Metabolic Panel    Immunofixation, Serum    Immunoglobulin Free LT Chains Blood    Immunoglobulins (IgG, IgA, IgM) Quantitative    Protein Electrophoresis, Serum       Total time of this visit was 31 minutes, including time spent face to face with patient and/or via video/audio, and also in preparing for today's visit for MDM and documentation. (Medical Decision Making, including consideration of possible diagnoses, management options, complex medical record review, review of diagnostic tests and information, consideration and discussion of significant complications based on comorbidities, and discussion with providers involved with the care of the patient). Greater than 50% was spent face to face with the patient counseling and coordinating care.    Visit today included increased complexity associated with the care of the episodic problem neuropathy addressed and managing the longitudinal care of the patient due to the serious and/or complex managed problem(s) multiple myeloma.     Thank you for allowing me to partake in the care of this patient. If there are any questions, please do not hesitate to reach out.    Shannon Culver, ALFREDOP-C  Hematologic Malignancies, Stem Cell Transplantation, and Cellular Therapy  [facility] and [facility]

## 2025-07-21 ENCOUNTER — PATIENT MESSAGE (OUTPATIENT)
Dept: HEMATOLOGY/ONCOLOGY | Facility: CLINIC | Age: 60
End: 2025-07-21
Payer: MEDICARE

## 2025-07-23 ENCOUNTER — LAB VISIT (OUTPATIENT)
Dept: LAB | Facility: HOSPITAL | Age: 60
End: 2025-07-23
Payer: MEDICARE

## 2025-07-23 DIAGNOSIS — C90.00 MULTIPLE MYELOMA NOT HAVING ACHIEVED REMISSION: ICD-10-CM

## 2025-07-23 LAB
ABSOLUTE EOSINOPHIL (OHS): 0.01 K/UL
ABSOLUTE MONOCYTE (OHS): 0.61 K/UL (ref 0.3–1)
ABSOLUTE NEUTROPHIL COUNT (OHS): 2.38 K/UL (ref 1.8–7.7)
ALBUMIN SERPL BCP-MCNC: 3.3 G/DL (ref 3.5–5.2)
ALP SERPL-CCNC: 55 UNIT/L (ref 40–150)
ALT SERPL W/O P-5'-P-CCNC: 13 UNIT/L (ref 10–44)
ANION GAP (OHS): 6 MMOL/L (ref 8–16)
AST SERPL-CCNC: 10 UNIT/L (ref 11–45)
BASOPHILS # BLD AUTO: 0 K/UL
BASOPHILS NFR BLD AUTO: 0 %
BILIRUB SERPL-MCNC: 0.6 MG/DL (ref 0.1–1)
BUN SERPL-MCNC: 10 MG/DL (ref 6–20)
CALCIUM SERPL-MCNC: 8.9 MG/DL (ref 8.7–10.5)
CHLORIDE SERPL-SCNC: 111 MMOL/L (ref 95–110)
CO2 SERPL-SCNC: 26 MMOL/L (ref 23–29)
CREAT SERPL-MCNC: 0.7 MG/DL (ref 0.5–1.4)
ERYTHROCYTE [DISTWIDTH] IN BLOOD BY AUTOMATED COUNT: 18.9 % (ref 11.5–14.5)
GFR SERPLBLD CREATININE-BSD FMLA CKD-EPI: >60 ML/MIN/1.73/M2
GLUCOSE SERPL-MCNC: 104 MG/DL (ref 70–110)
HCT VFR BLD AUTO: 31.7 % (ref 37–48.5)
HGB BLD-MCNC: 10.8 GM/DL (ref 12–16)
IGA SERPL-MCNC: 33 MG/DL (ref 40–350)
IGG SERPL-MCNC: 496 MG/DL (ref 650–1600)
IGM SERPL-MCNC: 22 MG/DL (ref 50–300)
IMM GRANULOCYTES # BLD AUTO: 0.01 K/UL (ref 0–0.04)
IMM GRANULOCYTES NFR BLD AUTO: 0.2 % (ref 0–0.5)
LYMPHOCYTES # BLD AUTO: 1.11 K/UL (ref 1–4.8)
MCH RBC QN AUTO: 29.1 PG (ref 27–31)
MCHC RBC AUTO-ENTMCNC: 34.1 G/DL (ref 32–36)
MCV RBC AUTO: 85 FL (ref 82–98)
NUCLEATED RBC (/100WBC) (OHS): 0 /100 WBC
PLATELET # BLD AUTO: 154 K/UL (ref 150–450)
PMV BLD AUTO: ABNORMAL FL
POTASSIUM SERPL-SCNC: 3.1 MMOL/L (ref 3.5–5.1)
PROT SERPL-MCNC: 5.7 GM/DL (ref 6–8.4)
RBC # BLD AUTO: 3.71 M/UL (ref 4–5.4)
RELATIVE EOSINOPHIL (OHS): 0.2 %
RELATIVE LYMPHOCYTE (OHS): 26.9 % (ref 18–48)
RELATIVE MONOCYTE (OHS): 14.8 % (ref 4–15)
RELATIVE NEUTROPHIL (OHS): 57.9 % (ref 38–73)
SODIUM SERPL-SCNC: 143 MMOL/L (ref 136–145)
WBC # BLD AUTO: 4.12 K/UL (ref 3.9–12.7)

## 2025-07-23 PROCEDURE — 84165 PROTEIN E-PHORESIS SERUM: CPT | Mod: HCNC,,, | Performed by: PATHOLOGY

## 2025-07-23 PROCEDURE — 36415 COLL VENOUS BLD VENIPUNCTURE: CPT | Mod: HCNC

## 2025-07-23 PROCEDURE — 82784 ASSAY IGA/IGD/IGG/IGM EACH: CPT | Mod: 59,HCNC

## 2025-07-23 PROCEDURE — 83521 IG LIGHT CHAINS FREE EACH: CPT | Mod: 59,HCNC

## 2025-07-23 PROCEDURE — 84165 PROTEIN E-PHORESIS SERUM: CPT | Mod: HCNC

## 2025-07-23 PROCEDURE — 86334 IMMUNOFIX E-PHORESIS SERUM: CPT | Mod: HCNC,,, | Performed by: PATHOLOGY

## 2025-07-23 PROCEDURE — 86334 IMMUNOFIX E-PHORESIS SERUM: CPT | Mod: HCNC

## 2025-07-23 PROCEDURE — 80053 COMPREHEN METABOLIC PANEL: CPT | Mod: HCNC

## 2025-07-23 PROCEDURE — 83521 IG LIGHT CHAINS FREE EACH: CPT | Mod: HCNC

## 2025-07-23 PROCEDURE — 85025 COMPLETE CBC W/AUTO DIFF WBC: CPT | Mod: HCNC
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
## 2025-07-24 ENCOUNTER — OFFICE VISIT (OUTPATIENT)
Dept: HEMATOLOGY/ONCOLOGY | Facility: CLINIC | Age: 60
End: 2025-07-24
Payer: MEDICARE

## 2025-07-24 ENCOUNTER — INFUSION (OUTPATIENT)
Dept: INFUSION THERAPY | Facility: HOSPITAL | Age: 60
End: 2025-07-24
Payer: MEDICARE

## 2025-07-24 VITALS
TEMPERATURE: 98 F | HEIGHT: 64 IN | DIASTOLIC BLOOD PRESSURE: 73 MMHG | WEIGHT: 275.13 LBS | OXYGEN SATURATION: 96 % | HEART RATE: 75 BPM | BODY MASS INDEX: 46.97 KG/M2 | SYSTOLIC BLOOD PRESSURE: 160 MMHG

## 2025-07-24 DIAGNOSIS — D68.69 OTHER THROMBOPHILIA: ICD-10-CM

## 2025-07-24 DIAGNOSIS — R43.2 ALTERED TASTE: ICD-10-CM

## 2025-07-24 DIAGNOSIS — E55.9 VITAMIN D DEFICIENCY: ICD-10-CM

## 2025-07-24 DIAGNOSIS — E66.813 CLASS 3 SEVERE OBESITY DUE TO EXCESS CALORIES WITH SERIOUS COMORBIDITY AND BODY MASS INDEX (BMI) OF 45.0 TO 49.9 IN ADULT: ICD-10-CM

## 2025-07-24 DIAGNOSIS — C90.00 MULTIPLE MYELOMA NOT HAVING ACHIEVED REMISSION: Primary | ICD-10-CM

## 2025-07-24 DIAGNOSIS — T45.1X5A CHEMOTHERAPY INDUCED DIARRHEA: ICD-10-CM

## 2025-07-24 DIAGNOSIS — Z79.899 IMMUNODEFICIENCY DUE TO DRUGS: ICD-10-CM

## 2025-07-24 DIAGNOSIS — K52.1 CHEMOTHERAPY INDUCED DIARRHEA: ICD-10-CM

## 2025-07-24 DIAGNOSIS — E87.6 HYPOKALEMIA: ICD-10-CM

## 2025-07-24 DIAGNOSIS — D64.89 ANEMIA DUE TO OTHER CAUSE, NOT CLASSIFIED: ICD-10-CM

## 2025-07-24 DIAGNOSIS — D84.821 IMMUNODEFICIENCY DUE TO DRUGS: ICD-10-CM

## 2025-07-24 LAB
ALBUMIN, SPE (OHS): 3.3 G/DL (ref 3.35–5.55)
ALPHA 1 GLOB (OHS): 0.29 GM/DL (ref 0.17–0.41)
ALPHA 2 GLOB (OHS): 0.61 GM/DL (ref 0.43–0.99)
BETA GLOB (OHS): 0.65 GM/DL (ref 0.5–1.1)
GAMMA GLOBULIN (OHS): 0.45 GM/DL (ref 0.67–1.58)
KAPPA LC FREE SER-MCNC: 2.34 MG/L (ref 0.26–1.65)
KAPPA LC FREE/LAMBDA FREE SER: 0.68 MG/DL (ref 0.33–1.94)
LAMBDA LC FREE SERPL-MCNC: 0.29 MG/DL (ref 0.57–2.63)
PROT SERPL-MCNC: 5.3 GM/DL (ref 6–8.4)

## 2025-07-24 PROCEDURE — 63600175 PHARM REV CODE 636 W HCPCS: Mod: HCNC

## 2025-07-24 PROCEDURE — 96401 CHEMO ANTI-NEOPL SQ/IM: CPT | Mod: HCNC

## 2025-07-24 PROCEDURE — 99999 PR PBB SHADOW E&M-EST. PATIENT-LVL V: CPT | Mod: PBBFAC,HCNC,,

## 2025-07-24 RX ORDER — EPINEPHRINE 0.3 MG/.3ML
0.3 INJECTION SUBCUTANEOUS ONCE AS NEEDED
Status: DISCONTINUED | OUTPATIENT
Start: 2025-07-24 | End: 2025-07-24 | Stop reason: HOSPADM

## 2025-07-24 RX ORDER — DIPHENHYDRAMINE HYDROCHLORIDE 50 MG/ML
50 INJECTION, SOLUTION INTRAMUSCULAR; INTRAVENOUS ONCE AS NEEDED
OUTPATIENT
Start: 2025-08-07

## 2025-07-24 RX ORDER — EPINEPHRINE 0.3 MG/.3ML
0.3 INJECTION SUBCUTANEOUS ONCE AS NEEDED
Status: CANCELLED | OUTPATIENT
Start: 2025-07-24

## 2025-07-24 RX ORDER — PROCHLORPERAZINE EDISYLATE 5 MG/ML
10 INJECTION INTRAMUSCULAR; INTRAVENOUS ONCE AS NEEDED
OUTPATIENT
Start: 2025-07-31

## 2025-07-24 RX ORDER — BORTEZOMIB 3.5 MG/1
1 INJECTION, POWDER, LYOPHILIZED, FOR SOLUTION INTRAVENOUS; SUBCUTANEOUS
Status: COMPLETED | OUTPATIENT
Start: 2025-07-24 | End: 2025-07-24

## 2025-07-24 RX ORDER — BORTEZOMIB 3.5 MG/1
1 INJECTION, POWDER, LYOPHILIZED, FOR SOLUTION INTRAVENOUS; SUBCUTANEOUS
OUTPATIENT
Start: 2025-07-31

## 2025-07-24 RX ORDER — BORTEZOMIB 3.5 MG/1
1 INJECTION, POWDER, LYOPHILIZED, FOR SOLUTION INTRAVENOUS; SUBCUTANEOUS
OUTPATIENT
Start: 2025-08-14

## 2025-07-24 RX ORDER — DIPHENHYDRAMINE HYDROCHLORIDE 50 MG/ML
50 INJECTION, SOLUTION INTRAMUSCULAR; INTRAVENOUS ONCE AS NEEDED
Status: DISCONTINUED | OUTPATIENT
Start: 2025-07-24 | End: 2025-07-24 | Stop reason: HOSPADM

## 2025-07-24 RX ORDER — EPINEPHRINE 0.3 MG/.3ML
0.3 INJECTION SUBCUTANEOUS ONCE AS NEEDED
OUTPATIENT
Start: 2025-07-31

## 2025-07-24 RX ORDER — EPINEPHRINE 0.3 MG/.3ML
0.3 INJECTION SUBCUTANEOUS ONCE AS NEEDED
OUTPATIENT
Start: 2025-08-07

## 2025-07-24 RX ORDER — SODIUM CHLORIDE 0.9 % (FLUSH) 0.9 %
10 SYRINGE (ML) INJECTION
Status: CANCELLED | OUTPATIENT
Start: 2025-07-24

## 2025-07-24 RX ORDER — EPINEPHRINE 0.3 MG/.3ML
0.3 INJECTION SUBCUTANEOUS ONCE AS NEEDED
OUTPATIENT
Start: 2025-08-14

## 2025-07-24 RX ORDER — HEPARIN 100 UNIT/ML
500 SYRINGE INTRAVENOUS
OUTPATIENT
Start: 2025-08-07

## 2025-07-24 RX ORDER — PROCHLORPERAZINE EDISYLATE 5 MG/ML
10 INJECTION INTRAMUSCULAR; INTRAVENOUS ONCE AS NEEDED
Status: CANCELLED | OUTPATIENT
Start: 2025-07-24

## 2025-07-24 RX ORDER — HEPARIN 100 UNIT/ML
500 SYRINGE INTRAVENOUS
OUTPATIENT
Start: 2025-08-14

## 2025-07-24 RX ORDER — HEPARIN 100 UNIT/ML
500 SYRINGE INTRAVENOUS
Status: DISCONTINUED | OUTPATIENT
Start: 2025-07-24 | End: 2025-07-24 | Stop reason: HOSPADM

## 2025-07-24 RX ORDER — PROCHLORPERAZINE EDISYLATE 5 MG/ML
10 INJECTION INTRAMUSCULAR; INTRAVENOUS ONCE AS NEEDED
Status: DISCONTINUED | OUTPATIENT
Start: 2025-07-24 | End: 2025-07-24 | Stop reason: HOSPADM

## 2025-07-24 RX ORDER — SODIUM CHLORIDE 0.9 % (FLUSH) 0.9 %
10 SYRINGE (ML) INJECTION
OUTPATIENT
Start: 2025-08-14

## 2025-07-24 RX ORDER — DIPHENHYDRAMINE HYDROCHLORIDE 50 MG/ML
50 INJECTION, SOLUTION INTRAMUSCULAR; INTRAVENOUS ONCE AS NEEDED
Status: CANCELLED | OUTPATIENT
Start: 2025-07-24

## 2025-07-24 RX ORDER — PROCHLORPERAZINE EDISYLATE 5 MG/ML
10 INJECTION INTRAMUSCULAR; INTRAVENOUS ONCE AS NEEDED
OUTPATIENT
Start: 2025-08-07

## 2025-07-24 RX ORDER — PROCHLORPERAZINE EDISYLATE 5 MG/ML
10 INJECTION INTRAMUSCULAR; INTRAVENOUS ONCE AS NEEDED
OUTPATIENT
Start: 2025-08-14

## 2025-07-24 RX ORDER — HEPARIN 100 UNIT/ML
500 SYRINGE INTRAVENOUS
Status: CANCELLED | OUTPATIENT
Start: 2025-07-24

## 2025-07-24 RX ORDER — DIPHENHYDRAMINE HYDROCHLORIDE 50 MG/ML
50 INJECTION, SOLUTION INTRAMUSCULAR; INTRAVENOUS ONCE AS NEEDED
OUTPATIENT
Start: 2025-07-31

## 2025-07-24 RX ORDER — BORTEZOMIB 3.5 MG/1
1 INJECTION, POWDER, LYOPHILIZED, FOR SOLUTION INTRAVENOUS; SUBCUTANEOUS
OUTPATIENT
Start: 2025-08-07

## 2025-07-24 RX ORDER — BORTEZOMIB 3.5 MG/1
1 INJECTION, POWDER, LYOPHILIZED, FOR SOLUTION INTRAVENOUS; SUBCUTANEOUS
Status: CANCELLED | OUTPATIENT
Start: 2025-07-24

## 2025-07-24 RX ORDER — SODIUM CHLORIDE 0.9 % (FLUSH) 0.9 %
10 SYRINGE (ML) INJECTION
Status: DISCONTINUED | OUTPATIENT
Start: 2025-07-24 | End: 2025-07-24 | Stop reason: HOSPADM

## 2025-07-24 RX ORDER — SODIUM CHLORIDE 0.9 % (FLUSH) 0.9 %
10 SYRINGE (ML) INJECTION
OUTPATIENT
Start: 2025-07-31

## 2025-07-24 RX ORDER — DIPHENHYDRAMINE HYDROCHLORIDE 50 MG/ML
50 INJECTION, SOLUTION INTRAMUSCULAR; INTRAVENOUS ONCE AS NEEDED
OUTPATIENT
Start: 2025-08-14

## 2025-07-24 RX ORDER — SODIUM CHLORIDE 0.9 % (FLUSH) 0.9 %
10 SYRINGE (ML) INJECTION
OUTPATIENT
Start: 2025-08-07

## 2025-07-24 RX ORDER — HEPARIN 100 UNIT/ML
500 SYRINGE INTRAVENOUS
OUTPATIENT
Start: 2025-07-31

## 2025-07-24 RX ADMIN — DARATUMUMAB AND HYALURONIDASE-FIHJ (HUMAN RECOMBINANT) 1800 MG: 1800; 30000 INJECTION SUBCUTANEOUS at 11:07

## 2025-07-24 RX ADMIN — BORTEZOMIB 2.5 MG: 3.5 INJECTION, POWDER, LYOPHILIZED, FOR SOLUTION INTRAVENOUS; SUBCUTANEOUS at 11:07

## 2025-07-26 LAB
PATHOLOGIST INTERPRETATION - IFE SERUM (OHS): NORMAL
PATHOLOGIST REVIEW - SPE (OHS): NORMAL

## 2025-07-28 ENCOUNTER — PATIENT MESSAGE (OUTPATIENT)
Dept: HEMATOLOGY/ONCOLOGY | Facility: CLINIC | Age: 60
End: 2025-07-28
Payer: MEDICARE

## 2025-07-30 ENCOUNTER — ANESTHESIA EVENT (OUTPATIENT)
Dept: ENDOSCOPY | Facility: HOSPITAL | Age: 60
End: 2025-07-30
Payer: MEDICARE

## 2025-07-30 ENCOUNTER — HOSPITAL ENCOUNTER (OUTPATIENT)
Dept: RADIOLOGY | Facility: HOSPITAL | Age: 60
Discharge: HOME | End: 2025-07-30
Payer: MEDICARE

## 2025-07-30 ENCOUNTER — ANESTHESIA (OUTPATIENT)
Dept: ENDOSCOPY | Facility: HOSPITAL | Age: 60
End: 2025-07-30
Payer: MEDICARE

## 2025-07-30 ENCOUNTER — HOSPITAL ENCOUNTER (OUTPATIENT)
Facility: HOSPITAL | Age: 60
Discharge: HOME | End: 2025-07-30
Attending: INTERNAL MEDICINE | Admitting: INTERNAL MEDICINE
Payer: MEDICARE

## 2025-07-30 VITALS
RESPIRATION RATE: 18 BRPM | WEIGHT: 273.38 LBS | BODY MASS INDEX: 46.67 KG/M2 | SYSTOLIC BLOOD PRESSURE: 137 MMHG | DIASTOLIC BLOOD PRESSURE: 65 MMHG | OXYGEN SATURATION: 99 % | HEIGHT: 64 IN | HEART RATE: 61 BPM | TEMPERATURE: 98 F

## 2025-07-30 DIAGNOSIS — C90.00 MULTIPLE MYELOMA NOT HAVING ACHIEVED REMISSION: Primary | ICD-10-CM

## 2025-07-30 DIAGNOSIS — C90.00 MULTIPLE MYELOMA NOT HAVING ACHIEVED REMISSION: ICD-10-CM

## 2025-07-30 LAB — POCT GLUCOSE: 109 MG/DL (ref 70–110)

## 2025-07-30 PROCEDURE — 78816 PET IMAGE W/CT FULL BODY: CPT | Mod: 26,HCNC,PS, | Performed by: NUCLEAR MEDICINE

## 2025-07-30 PROCEDURE — 88237 TISSUE CULTURE BONE MARROW: CPT | Mod: HCNC | Performed by: NURSE PRACTITIONER

## 2025-07-30 PROCEDURE — 88184 FLOWCYTOMETRY/ TC 1 MARKER: CPT | Mod: 59,HCNC | Performed by: NURSE PRACTITIONER

## 2025-07-30 PROCEDURE — 37000008 HC ANESTHESIA 1ST 15 MINUTES: Mod: HCNC | Performed by: INTERNAL MEDICINE

## 2025-07-30 PROCEDURE — A9552 F18 FDG: HCPCS | Mod: HCNC

## 2025-07-30 PROCEDURE — 88185 FLOWCYTOMETRY/TC ADD-ON: CPT | Mod: 91,HCNC | Performed by: NURSE PRACTITIONER

## 2025-07-30 PROCEDURE — 88185 FLOWCYTOMETRY/TC ADD-ON: CPT | Mod: 59,HCNC | Performed by: NURSE PRACTITIONER

## 2025-07-30 PROCEDURE — 63600175 PHARM REV CODE 636 W HCPCS: Mod: HCNC | Performed by: NURSE ANESTHETIST, CERTIFIED REGISTERED

## 2025-07-30 PROCEDURE — 25000003 PHARM REV CODE 250: Mod: HCNC | Performed by: NURSE ANESTHETIST, CERTIFIED REGISTERED

## 2025-07-30 PROCEDURE — 88299 UNLISTED CYTOGENETIC STUDY: CPT | Mod: HCNC | Performed by: NURSE PRACTITIONER

## 2025-07-30 PROCEDURE — 38222 DX BONE MARROW BX & ASPIR: CPT | Mod: HCNC | Performed by: INTERNAL MEDICINE

## 2025-07-30 PROCEDURE — 78816 PET IMAGE W/CT FULL BODY: CPT | Mod: TC,HCNC

## 2025-07-30 PROCEDURE — 63600175 PHARM REV CODE 636 W HCPCS: Mod: HCNC | Performed by: INTERNAL MEDICINE

## 2025-07-30 PROCEDURE — 37000009 HC ANESTHESIA EA ADD 15 MINS: Mod: HCNC | Performed by: INTERNAL MEDICINE

## 2025-07-30 PROCEDURE — 88271 CYTOGENETICS DNA PROBE: CPT | Mod: HCNC | Performed by: NURSE PRACTITIONER

## 2025-07-30 PROCEDURE — 88185 FLOWCYTOMETRY/TC ADD-ON: CPT | Mod: HCNC | Performed by: NURSE PRACTITIONER

## 2025-07-30 PROCEDURE — 38222 DX BONE MARROW BX & ASPIR: CPT | Mod: HCNC,RT,, | Performed by: NURSE PRACTITIONER

## 2025-07-30 RX ORDER — FLUDEOXYGLUCOSE F18 500 MCI/ML
12 INJECTION INTRAVENOUS
Status: COMPLETED | OUTPATIENT
Start: 2025-07-30 | End: 2025-07-30

## 2025-07-30 RX ORDER — LIDOCAINE HYDROCHLORIDE 20 MG/ML
INJECTION, SOLUTION INFILTRATION; PERINEURAL
Status: COMPLETED | OUTPATIENT
Start: 2025-07-30 | End: 2025-07-30

## 2025-07-30 RX ORDER — PROPOFOL 10 MG/ML
VIAL (ML) INTRAVENOUS
Status: DISCONTINUED | OUTPATIENT
Start: 2025-07-30 | End: 2025-07-30

## 2025-07-30 RX ORDER — LIDOCAINE HYDROCHLORIDE 20 MG/ML
INJECTION, SOLUTION EPIDURAL; INFILTRATION; INTRACAUDAL; PERINEURAL
Status: DISCONTINUED | OUTPATIENT
Start: 2025-07-30 | End: 2025-07-30

## 2025-07-30 RX ADMIN — LIDOCAINE HYDROCHLORIDE 50 MG: 20 INJECTION, SOLUTION EPIDURAL; INFILTRATION; INTRACAUDAL; PERINEURAL at 01:07

## 2025-07-30 RX ADMIN — PROPOFOL 75 MG: 10 INJECTION, EMULSION INTRAVENOUS at 01:07

## 2025-07-30 RX ADMIN — FLUDEOXYGLUCOSE F-18 11.15 MILLICURIE: 500 INJECTION INTRAVENOUS at 11:07

## 2025-07-30 RX ADMIN — SODIUM CHLORIDE: 0.9 INJECTION, SOLUTION INTRAVENOUS at 01:07

## 2025-07-30 RX ADMIN — PROPOFOL 170 MCG/KG/MIN: 10 INJECTION, EMULSION INTRAVENOUS at 01:07

## 2025-07-30 NOTE — BRIEF OP NOTE
PROCEDURE NOTE:  Date of Procedure: 07/30/2025  Bone Marrow Biopsy and Aspiration  Indication: MM restaging (on Manisha-VRD)  Consent: Informed consent was obtained from patient.  Timeout: Done and documented. Allergies reviewed.  Position: left lateral  Site: right posterior illiac crest.  Prep: Betadine.  Needle used: 11 gauge LONG Jamshidi needle.  Anesthetic: 2% lidocaine 10 cc.  Biopsy: The biopsy needle was introduced into the marrow cavity and an aspirate was obtained without complications and sent for flow cytometry, MRDMM, PCPD fish, DNA/RNA hold, and cytogenetics. Core biopsy obtained without difficulty and sent for routine histologic examination.  Complications: None.  Disposition: The patient was placed supine following procedure. RN to assess bandaid for bleeding prior to discharge home. Patient aware to keep bandaid dry and intact for 24hrs and to call clinic for any bleeding, fevers, pain, or signs of infection.  Blood loss: Minimal.     Zuleyka Madden NP  Hematology/Oncology/BMT

## 2025-07-30 NOTE — DISCHARGE INSTRUCTIONS
Discharge instructions for having a Bone Marrow Aspiration / Biopsy:    Keep Bandage in place for 24 hours.  - Do not shower or take a tube bath during this time (you may sponge bathe).  - Call the nurse or physician for excessive bleeding or pain at biopsy site.  - You may take Tylenol as needed for pain.    During procedure today you have received medication to sedate you. These medications may affect your judgment, balance, and/or coordination. Therefore, for the next 24 hours, you have the follow restrictions:  - Do not drive a car or operate heavy machinery for the rest of the day.  - Do not make legal/financial decisions, sign important papers, or drink alcohol.  - You may resume other activities as tolerated.    You can call [phone] for any problems during the hours of 8:00 AM-5:00PM.    For an emergency after 5:00 PM you can call [phone] and have the  page the Hematologist / Oncologist on call.

## 2025-07-30 NOTE — PLAN OF CARE
Bone marrow aspiration and biopsy performed by Zuleyka Madden NP via right iliac crest. Linda Cahwla,  present.

## 2025-07-30 NOTE — ANESTHESIA POSTPROCEDURE EVALUATION
Anesthesia Post Evaluation    Patient: Amna Chawla    Procedure(s) Performed: Procedure(s) (LRB):  Biopsy-bone marrow (Right)    Final Anesthesia Type: general      Patient location during evaluation: GI PACU  Patient participation: Yes- Able to Participate  Level of consciousness: awake and alert and oriented  Post-procedure vital signs: reviewed and stable  Pain management: adequate  Airway patency: patent    PONV status at discharge: No PONV  Anesthetic complications: no      Cardiovascular status: hemodynamically stable  Respiratory status: unassisted  Hydration status: euvolemic  Follow-up not needed.              Vitals Value Taken Time   /65 07/30/25 14:00   Temp 36.4 °C (97.5 °F) 07/30/25 13:28   Pulse 61 07/30/25 14:00   Resp 18 07/30/25 14:00   SpO2 99 % 07/30/25 14:00         Event Time   Out of Recovery 14:04:41         Pain/Kofi Score: Kofi Score: 9 (7/30/2025  1:29 PM)

## 2025-07-30 NOTE — TRANSFER OF CARE
"Anesthesia Transfer of Care Note    Patient: Amna Chawla    Procedure(s) Performed: Procedure(s) (LRB):  Biopsy-bone marrow (Right)    Patient location: GI    Anesthesia Type: general    Transport from OR: Transported from OR on room air with adequate spontaneous ventilation    Post pain: adequate analgesia    Post assessment: no apparent anesthetic complications and tolerated procedure well    Post vital signs: stable    Level of consciousness: lethargic, responds to stimulation and awake    Nausea/Vomiting: no nausea/vomiting    Complications: none    Transfer of care protocol was followed    Last vitals: Visit Vitals  BP (!) 121/58 (BP Location: Right forearm, Patient Position: Lying)   Pulse 61   Temp 37 °C (98.6 °F) (Temporal)   Resp 16   Ht 5' 4" (1.626 m)   Wt 124 kg (273 lb 5.9 oz)   LMP 01/03/2016   SpO2 99%   Breastfeeding No   BMI 46.92 kg/m²     "

## 2025-07-30 NOTE — ANESTHESIA PREPROCEDURE EVALUATION
07/30/2025  Amna Chawla is a 60 y.o., female for bone marrow biopsy.      Patient Active Problem List   Diagnosis    Complete AV block    Mixed hyperlipidemia    History of CVA (cerebrovascular accident)    Knee pain    Hiatal hernia    GERD (gastroesophageal reflux disease)    Occipital neuralgia    Chronic migraine without aura, with intractable migraine, so stated, with status migrainosus    History of sudden cardiac arrest    Biventricular automatic implantable cardioverter defibrillator in situ    Left-sided headache    Essential hypertension    Supraorbital neuralgia    Anxiety    Keratoconjunctivitis sicca of both eyes not specified as Sjogren's    Right carpal tunnel syndrome    Impaired mobility and ADLs    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    Headaches due to old head injury    Decreased ROM of neck    Bilateral carpal tunnel syndrome    Paroxysmal atrial fibrillation    Long term (current) use of anticoagulants    Impaired functional mobility, balance, gait, and endurance    Obstructive sleep apnea    Muscle weakness of lower extremity    Nonischemic cardiomyopathy from RV pacing, resolved with upgrade cardiac resynchronization therapy    History of DVT (deep vein thrombosis)    Depression, major, recurrent, moderate    Insomnia    Left atrial enlargement    History of TIA (transient ischemic attack)    Thyroid cyst    Hemispheric carotid artery syndrome    Thyroid nodule    Anemia due to vitamin B12 deficiency    Vitamin D deficiency    Trigger middle finger of left hand    Prediabetes    B12 deficiency    Acquired trigger finger of right middle finger    Leg edema    Non-compliance    Acute pain of left knee    Decreased range of motion of left knee    Headache    Mild neurocognitive disorder due to multiple etiologies     Epistaxis    Class 3 severe obesity due to excess calories with serious comorbidity and body mass index (BMI) of 45.0 to 49.9 in adult    Non-cardiac chest pain    Grief    Left leg weakness    History of stroke    Neutropenia, unspecified type    Aortic atherosclerosis    Hemiparesis affecting left side as late effect of stroke    Empty sella    Lower abdominal pain    Gastroenteritis    Elevated total protein    Multiple myeloma not having achieved remission     .    Anesthesia Evaluation    I have reviewed the Patient Summary Reports.          Review of Systems  Anesthesia Hx:  No problems with previous Anesthesia                Social:  Non-Smoker       Hematology/Oncology:  Hematology Normal   Oncology Normal                                   EENT/Dental:  EENT/Dental Normal           Cardiovascular:    Pacemaker Hypertension    Dysrhythmias           Negative PET stress in Feb '22.                     Hypertension     Disorder of Cardiac Rhythm, Atrial Fibrillation     Pulmonary:    Asthma    Sleep Apnea   Asthma:    Obstructive Sleep Apnea (SHIRA).           Renal/:  Renal/ Normal                 Hepatic/GI:    Hiatal Hernia, GERD         Gerd    Hernia, Hiatal Hernia      Musculoskeletal:  Musculoskeletal Normal                Neurological:  TIA, CVA Neuromuscular Disease,  Headaches Seizures     Dx of Headaches      Seizure Disorder             CVA - Cerebrovasular Accident     TIA - Transient Ischemic Attack             Neuromuscular Disease   Endocrine:  Endocrine Normal            Dermatological:  Skin Normal    Psych:  Psychiatric History                Physical Exam  General:  Alert and Oriented       Airway/Jaw/Neck:  Airway Findings: Mouth Opening: Normal     Tongue: Normal      General Airway Assessment: Adult      Mallampati: II  Improves to II with phonation.  TM Distance: Normal, at least 6 cm       Neck ROM: Normal ROM          Dental:   Intact   No active dental problems.     Chest/Lungs:  Chest/Lungs Findings:  Clear to auscultation       Heart/Vascular:  Heart Findings: Rate: Normal  Rhythm: Regular Rhythm  Sounds: Normal                       Mental Status:  Mental Status Findings:  Cooperative, Alert and Oriented         Anesthesia Plan  Type of Anesthesia, risks & benefits discussed:  Anesthesia Type:  Gen Natural Airway    Patient's Preference:   Plan Factors:          Intra-op Monitoring Plan: standard ASA monitors  Intra-op Monitoring Plan Comments:   Post Op Pain Control Plan:   Post Op Pain Control Plan Comments:     Induction:   IV  Beta Blocker:         Informed Consent: Informed consent signed with the Patient and all parties understand the risks and agree with anesthesia plan.  All questions answered.  Anesthesia consent signed with patient.  ASA Score: 3     Day of Surgery Review of History & Physical: I have interviewed and examined the patient. I have reviewed the patient's H&P dated:    H&P Update referred to the surgeon/provider.    Anesthesia Plan Notes:          Ready For Surgery From Anesthesia Perspective.           Physical Exam  General: Alert and Oriented    Airway:  Mallampati: II / II  Mouth Opening: Normal  TM Distance: Normal, at least 6 cm  Tongue: Normal  Neck ROM: Normal ROM    Dental:  Intact    Chest/Lungs:  Clear to auscultation    Heart:  Rate: Normal  Rhythm: Regular Rhythm  Sounds: Normal        Anesthesia Plan  Type of Anesthesia, risks & benefits discussed:    Anesthesia Type: Gen Natural Airway  Intra-op Monitoring Plan: standard ASA monitors  Induction:  IV  Informed Consent: Informed consent signed with the Patient and all parties understand the risks and agree with anesthesia plan.  All questions answered.   ASA Score: 3  Day of Surgery Review of History & Physical: H&P Update referred to the surgeon/provider.I have interviewed and examined the patient. I have reviewed the patient's H&P dated: There are no significant changes.   Anesthesia  Plan Notes:      Ready For Surgery From Anesthesia Perspective.       .

## 2025-07-30 NOTE — INTERVAL H&P NOTE
- Acute pain secondary to traumatic injuries  - Multi modal pain regimen  - Bowel regimen as long as using opioids   - Continue to monitor pain and adjust regimen as indicated 
The patient has been examined and the H&P has been reviewed:    I concur with the findings and no changes have occurred since H&P was written.    Procedure risks, benefits and alternative options discussed and understood by patient/family.          There are no hospital problems to display for this patient.    
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 She will receive luis enrique-c x16 doses, daunorubicin with dexrazoxane x3 doses, and etoposide x5 doses.

## 2025-07-30 NOTE — DISCHARGE SUMMARY
[facility] 4th Floor  Hematology  Bone Marrow Transplant  Discharge Summary      Patient Name: Amna Chawla  MRN: [id number]  Admission Date: 7/30/2025  Hospital Length of Stay: 0 days  Discharge Date and Time: 07/30/2025 1:25 PM  Attending Physician: Vanna De La Garza MD   Discharging Provider: Zuleyka Madden NP  Primary Care Provider: Tiffany Mina MD      Procedure(s) (LRB):  Biopsy-bone marrow (Right)     Hospital Course: Patient admitted to endoscopy suite today for a bone marrow aspiration and biopsy. Pt was consented for a bone marrow biopsy. Patient was sedated per anesthesia and a bone marrow biopsy and aspiration was performed in the endoscopy suite procedure room (see procedure note). Patient was then transferred to post op recovery area and discharged home when appropriate per anesthesia.  Follow up with Dr. Porter scheduled for 8/7/25    Goals of Care Treatment Preferences:  Code Status: Full Code          Significant Diagnostic Studies:   Specimen (24h ago, onward)       Start     Ordered    07/30/25 1249  Specimen to Pathology, Bone Marrow Aspiration/Biopsy  Once        Question Answer Comment   Specimen Source Bone Marrow Aspirate, Right Iliac Crest    Specimen Source Bone Marrow Clot, Right Iliac Crest    Specimen Source Bone Marrow Core Bx, Right Iliac Crest    Clinical Information: MM restaging s/p induction with Manisha-VRD    Release to patient Immediate        07/30/25 1249                    Pending Diagnostic Studies:       Procedure Component Value Units Date/Time    Chromosome Analysis, Bone Marrow Right Posterior Iliac Crest [[id number]] Collected: 07/30/25 1309    Order Status: Sent Lab Status: No result     Specimen: Bone Marrow     Heme Disorders DNA/RNA Hold -Bone Marrow [[id number]] Collected: 07/30/25 1309    Order Status: Sent Lab Status: No result     Specimen: Bone Marrow Aspirate, Right Iliac Crest     Leukemia/Lymphoma Screen - Bone Marrow Right Posterior Iliac Crest  [[id number]] Collected: 07/30/25 1309    Order Status: Sent Lab Status: No result     Specimen: Bone Marrow     Multiple Myeloma MRD by Flow, BM [[id number]] Collected: 07/30/25 1309    Order Status: Sent Lab Status: No result     Specimen: Bone Marrow     Plasma Cell Proliferative Disorder (PCPD), FISH [[id number]] Collected: 07/30/25 1309    Order Status: Sent Lab Status: No result     Specimen: Bone Marrow Aspirate, Right Iliac Crest     Specimen to Pathology, Bone Marrow Aspiration/Biopsy [[id number]] Collected: 07/30/25 1309    Order Status: Sent Lab Status: No result     Specimen: Bone Marrow Aspirate, Right Iliac Crest; Bone Marrow Clot, Right Iliac Crest; Bone Marrow Core Bx, Right Iliac Crest           There are no hospital problems to display for this patient.     Discharged Condition: good    Disposition: Home or Self Care    Follow Up:   Future Appointments   Date Time Provider Department Center   7/31/2025  8:15 AM EKG, APPT [facility] EKG [facility]   7/31/2025  8:40 AM COORDINATED DEVICE CHECK [facility]   7/31/2025  9:00 AM Silvia Wang NP [facility]   7/31/2025 10:00 AM INJECTION, [facility] IN[facility]   8/7/2025  2:45 PM INJECTION, [facility]   8/7/2025  4:00 PM Milo Porter MD [facility] BMT [facility]   8/14/2025 10:00 AM INJECTION, [facility]   8/20/2025  8:50 AM [facility] BMT [facility]   8/26/2025 10:00 AM Tiffany Mina MD [facility] IM [facility] [address]   9/4/2025  9:20 AM Shannon Culver, NP [facility] BMT [facility]   9/12/2025  1:00 PM Mónica Blackburn NP [facility] PR[facility]AR [facility] [place]   9/22/2025  9:00 AM [facility] BMT [facility]   9/25/2025  9:20 AM Shannon Culver NP [facility] B[facility]         Patient Instructions:      Diet Adult Regular     No driving until:     Order Specific Question Answer Comments   Date: 7/31/2025      Remove dressing in 24 hours     Notify  your health care provider if you experience any of the following:  temperature >100.4     Notify your health care provider if you experience any of the following:  persistent nausea and vomiting or diarrhea     Notify your health care provider if you experience any of the following:  severe uncontrolled pain     Notify your health care provider if you experience any of the following:  redness, tenderness, or signs of infection (pain, swelling, redness, odor or green/yellow discharge around incision site)     Notify your health care provider if you experience any of the following:  difficulty breathing or increased cough     Notify your health care provider if you experience any of the following:  severe persistent headache     Notify your health care provider if you experience any of the following:  worsening rash     Notify your health care provider if you experience any of the following:  persistent dizziness, light-headedness, or visual disturbances     Notify your health care provider if you experience any of the following:  increased confusion or weakness     Activity as tolerated     Medications:  Reconciled Home Medications:      Medication List        ASK your doctor about these medications      acyclovir 400 MG tablet  Commonly known as: ZOVIRAX  Take 1 tablet (400 mg total) by mouth 2 (two) times daily.     amLODIPine 5 MG tablet  Commonly known as: NORVASC  Take 5 mg by mouth.     ARIPiprazole 15 MG Tab  Commonly known as: ABILIFY  Take 15 mg by mouth.     carvediloL 12.5 MG tablet  Commonly known as: COREG  Take 1 tablet (12.5 mg total) by mouth 2 (two) times daily.     clonazePAM 1 MG tablet  Commonly known as: KlonoPIN  TAKE 1 TABLET BY MOUTH DAILY AS NEEDED FOR ANXIETY     dexAMETHasone 4 MG Tab  Commonly known as: DECADRON  Take 10 tablets (40 mg total) by mouth every 7 days. Take with food.     dicyclomine 10 MG capsule  Commonly known as: BENTYL  TAKE 1 CAPSULE(10 MG) BY MOUTH TWICE DAILY AS NEEDED  FOR ABDOMINAL CRAMPS     EPINEPHrine 0.3 mg/0.3 mL Atin  Commonly known as: EPIPEN  Inject 0.3 mLs (0.3 mg total) into the muscle once. for 1 dose     ergocalciferol 50,000 unit Cap  Commonly known as: ERGOCALCIFEROL  Take 1 capsule (50,000 Units total) by mouth every 7 days.     furosemide 20 MG tablet  Commonly known as: LASIX  Take 1 tablet (20 mg total) by mouth daily as needed. Take as needed for weight gain (2-3 lbs/day or 5 lbs/week), shortness of breath, or leg swelling     lenalidomide 25 mg Cap  Take 1 capsule by mouth on days 1-21 of a 28 day cycle. [facility] [id number] 6/30/25.     LIDOcaine 5 %  Commonly known as: LIDODERM  Place 1 patch onto the skin once daily. Remove & Discard patch within 12 hours or as directed by MD     losartan 100 MG tablet  Commonly known as: COZAAR  Take 1 tablet (100 mg total) by mouth once daily.     omeprazole 40 MG capsule  Commonly known as: PRILOSEC  Take 1 capsule (40 mg total) by mouth once daily. Take 30 minutes before breakfast     ondansetron 4 MG Tbdl  Commonly known as: ZOFRAN-ODT  Dissolve 2 tablets (8 mg total) by mouth every 6 (six) hours as needed.     polyethylene glycol 17 gram Pwpk  Commonly known as: GLYCOLAX  Take 17 g by mouth once daily.     potassium chloride SA 20 MEQ tablet  Commonly known as: K-DUR,KLOR-CON  Take 1 tablet (20 mEq total) by mouth once daily.     prochlorperazine 5 MG tablet  Commonly known as: COMPAZINE  Take 2 tablets (10 mg total) by mouth every 6 (six) hours as needed for Nausea.     rivaroxaban 20 mg Tab  Commonly known as: XARELTO  Take 1 tablet (20 mg total) by mouth daily with dinner or evening meal.     rosuvastatin 40 MG Tab  Commonly known as: CRESTOR  Take 1 tablet (40 mg total) by mouth every evening.     tiaGABine 4 MG tablet  Commonly known as: GABITRIL  Take 1 tablet (4 mg total) by mouth every evening.     tiZANidine 4 MG tablet  Commonly known as: ZANAFLEX  Take by mouth 2 (two) times daily.     zolpidem 10 mg Tab  Commonly  known as: AMBIEN  Take 1 tablet (10 mg total) by mouth nightly as needed (insomnia).              Zuleyka Madden NP  Bone Marrow Transplant  [facility] 4th Floor

## 2025-07-31 ENCOUNTER — HOSPITAL ENCOUNTER (OUTPATIENT)
Dept: CARDIOLOGY | Facility: CLINIC | Age: 60
Discharge: HOME | End: 2025-07-31
Payer: MEDICARE

## 2025-07-31 ENCOUNTER — CLINICAL SUPPORT (OUTPATIENT)
Dept: CARDIOLOGY | Facility: HOSPITAL | Age: 60
End: 2025-07-31
Attending: NURSE PRACTITIONER
Payer: MEDICARE

## 2025-07-31 ENCOUNTER — EXTERNAL CHRONIC CARE MANAGEMENT (OUTPATIENT)
Dept: PRIMARY CARE CLINIC | Facility: CLINIC | Age: 60
End: 2025-07-31
Payer: MEDICARE

## 2025-07-31 ENCOUNTER — OFFICE VISIT (OUTPATIENT)
Dept: ELECTROPHYSIOLOGY | Facility: CLINIC | Age: 60
End: 2025-07-31
Payer: MEDICARE

## 2025-07-31 ENCOUNTER — INFUSION (OUTPATIENT)
Dept: INFUSION THERAPY | Facility: HOSPITAL | Age: 60
End: 2025-07-31
Payer: MEDICARE

## 2025-07-31 VITALS
SYSTOLIC BLOOD PRESSURE: 136 MMHG | HEART RATE: 76 BPM | WEIGHT: 273 LBS | DIASTOLIC BLOOD PRESSURE: 70 MMHG | BODY MASS INDEX: 46.61 KG/M2 | HEIGHT: 64 IN

## 2025-07-31 VITALS — OXYGEN SATURATION: 100 % | TEMPERATURE: 98 F | RESPIRATION RATE: 18 BRPM

## 2025-07-31 DIAGNOSIS — Z95.810 BIVENTRICULAR AUTOMATIC IMPLANTABLE CARDIOVERTER DEFIBRILLATOR IN SITU: Primary | ICD-10-CM

## 2025-07-31 DIAGNOSIS — Z86.73 HISTORY OF STROKE: ICD-10-CM

## 2025-07-31 DIAGNOSIS — I48.0 PAROXYSMAL ATRIAL FIBRILLATION: ICD-10-CM

## 2025-07-31 DIAGNOSIS — C90.00 MULTIPLE MYELOMA NOT HAVING ACHIEVED REMISSION: Primary | ICD-10-CM

## 2025-07-31 DIAGNOSIS — Z86.74 HISTORY OF SUDDEN CARDIAC ARREST: ICD-10-CM

## 2025-07-31 DIAGNOSIS — G47.33 OBSTRUCTIVE SLEEP APNEA: ICD-10-CM

## 2025-07-31 DIAGNOSIS — I48.0 PAROXYSMAL ATRIAL FIBRILLATION: Primary | ICD-10-CM

## 2025-07-31 DIAGNOSIS — I10 ESSENTIAL HYPERTENSION: ICD-10-CM

## 2025-07-31 DIAGNOSIS — Z79.01 LONG TERM (CURRENT) USE OF ANTICOAGULANTS: ICD-10-CM

## 2025-07-31 DIAGNOSIS — I44.2 COMPLETE AV BLOCK: ICD-10-CM

## 2025-07-31 DIAGNOSIS — I42.8 NONISCHEMIC CARDIOMYOPATHY: ICD-10-CM

## 2025-07-31 LAB
CLINICAL CYTOGENETICIST REVIEW: NORMAL
OHS CV BIV PACING PERCENT: 93 %
OHS CV DC REMOTE DEVICE TYPE: NORMAL
OHS QRS DURATION: 128 MS
OHS QTC CALCULATION: 515 MS

## 2025-07-31 PROCEDURE — 3075F SYST BP GE 130 - 139MM HG: CPT | Mod: CPTII,HCNC,S$GLB, | Performed by: NURSE PRACTITIONER

## 2025-07-31 PROCEDURE — 3078F DIAST BP <80 MM HG: CPT | Mod: CPTII,HCNC,S$GLB, | Performed by: NURSE PRACTITIONER

## 2025-07-31 PROCEDURE — 99214 OFFICE O/P EST MOD 30 MIN: CPT | Mod: HCNC,S$GLB,, | Performed by: NURSE PRACTITIONER

## 2025-07-31 PROCEDURE — 96401 CHEMO ANTI-NEOPL SQ/IM: CPT | Mod: HCNC

## 2025-07-31 PROCEDURE — 63600175 PHARM REV CODE 636 W HCPCS: Mod: HCNC

## 2025-07-31 PROCEDURE — 99999 PR PBB SHADOW E&M-EST. PATIENT-LVL V: CPT | Mod: PBBFAC,HCNC,, | Performed by: NURSE PRACTITIONER

## 2025-07-31 PROCEDURE — 3008F BODY MASS INDEX DOCD: CPT | Mod: CPTII,HCNC,S$GLB, | Performed by: NURSE PRACTITIONER

## 2025-07-31 PROCEDURE — 4010F ACE/ARB THERAPY RXD/TAKEN: CPT | Mod: CPTII,HCNC,S$GLB, | Performed by: NURSE PRACTITIONER

## 2025-07-31 PROCEDURE — 93284 PRGRMG EVAL IMPLANTABLE DFB: CPT | Mod: HCNC

## 2025-07-31 PROCEDURE — 99439 CHRNC CARE MGMT STAF EA ADDL: CPT | Mod: S$GLB,,, | Performed by: INTERNAL MEDICINE

## 2025-07-31 PROCEDURE — 99490 CHRNC CARE MGMT STAFF 1ST 20: CPT | Mod: S$GLB,,, | Performed by: INTERNAL MEDICINE

## 2025-07-31 PROCEDURE — 3044F HG A1C LEVEL LT 7.0%: CPT | Mod: CPTII,HCNC,S$GLB, | Performed by: NURSE PRACTITIONER

## 2025-07-31 PROCEDURE — 93010 ELECTROCARDIOGRAM REPORT: CPT | Mod: HCNC,S$GLB,, | Performed by: STUDENT IN AN ORGANIZED HEALTH CARE EDUCATION/TRAINING PROGRAM

## 2025-07-31 RX ORDER — BORTEZOMIB 3.5 MG/1
1 INJECTION, POWDER, LYOPHILIZED, FOR SOLUTION INTRAVENOUS; SUBCUTANEOUS
Status: COMPLETED | OUTPATIENT
Start: 2025-07-31 | End: 2025-07-31

## 2025-07-31 RX ORDER — PROCHLORPERAZINE EDISYLATE 5 MG/ML
10 INJECTION INTRAMUSCULAR; INTRAVENOUS ONCE AS NEEDED
Status: DISCONTINUED | OUTPATIENT
Start: 2025-07-31 | End: 2025-07-31 | Stop reason: HOSPADM

## 2025-07-31 RX ORDER — SERTRALINE HYDROCHLORIDE 100 MG/1
100 TABLET, FILM COATED ORAL 2 TIMES DAILY
COMMUNITY
Start: 2025-03-29

## 2025-07-31 RX ORDER — EPINEPHRINE 0.3 MG/.3ML
0.3 INJECTION SUBCUTANEOUS ONCE AS NEEDED
Status: DISCONTINUED | OUTPATIENT
Start: 2025-07-31 | End: 2025-07-31 | Stop reason: HOSPADM

## 2025-07-31 RX ORDER — DIPHENHYDRAMINE HYDROCHLORIDE 50 MG/ML
50 INJECTION, SOLUTION INTRAMUSCULAR; INTRAVENOUS ONCE AS NEEDED
Status: DISCONTINUED | OUTPATIENT
Start: 2025-07-31 | End: 2025-07-31 | Stop reason: HOSPADM

## 2025-07-31 RX ADMIN — BORTEZOMIB 2.5 MG: 3.5 INJECTION, POWDER, LYOPHILIZED, FOR SOLUTION INTRAVENOUS; SUBCUTANEOUS at 10:07

## 2025-08-01 LAB
CLINICAL CYTOGENETICIST REVIEW: NORMAL
LAB FLOW CYTOMETRY ANTIBODIES ANALYZED (OHS): NORMAL
LAB FLOW CYTOMETRY COMMENT (OHS): NORMAL
LAB FLOW CYTOMETRY INTERPRETATION (OHS): NORMAL
LABORATORY COMMENT REPORT: NORMAL
M DNA/RNA EXTRACT AND HOLD RESULT: NORMAL
M DNA/RNA EXTRACTION: NORMAL
M PCPDS PRE-ANALYSIS CELL SORT: NORMAL
SPECIMEN SOURCE: NORMAL

## 2025-08-07 ENCOUNTER — OFFICE VISIT (OUTPATIENT)
Dept: HEMATOLOGY/ONCOLOGY | Facility: CLINIC | Age: 60
End: 2025-08-07
Payer: MEDICARE

## 2025-08-07 ENCOUNTER — INFUSION (OUTPATIENT)
Dept: INFUSION THERAPY | Facility: HOSPITAL | Age: 60
End: 2025-08-07
Payer: MEDICARE

## 2025-08-07 VITALS
DIASTOLIC BLOOD PRESSURE: 77 MMHG | BODY MASS INDEX: 45.22 KG/M2 | SYSTOLIC BLOOD PRESSURE: 153 MMHG | HEART RATE: 77 BPM | TEMPERATURE: 98 F | OXYGEN SATURATION: 100 % | BODY MASS INDEX: 45.6 KG/M2 | WEIGHT: 264.88 LBS | TEMPERATURE: 98 F | RESPIRATION RATE: 16 BRPM | WEIGHT: 265.63 LBS | SYSTOLIC BLOOD PRESSURE: 128 MMHG | DIASTOLIC BLOOD PRESSURE: 60 MMHG | OXYGEN SATURATION: 100 % | HEART RATE: 80 BPM | HEIGHT: 64 IN

## 2025-08-07 DIAGNOSIS — Z09 CHEMOTHERAPY FOLLOW-UP EXAMINATION: ICD-10-CM

## 2025-08-07 DIAGNOSIS — C90.00 MULTIPLE MYELOMA NOT HAVING ACHIEVED REMISSION: ICD-10-CM

## 2025-08-07 DIAGNOSIS — Z79.899 IMMUNODEFICIENCY DUE TO DRUGS: Primary | ICD-10-CM

## 2025-08-07 DIAGNOSIS — C90.00 MULTIPLE MYELOMA NOT HAVING ACHIEVED REMISSION: Primary | ICD-10-CM

## 2025-08-07 DIAGNOSIS — D84.821 IMMUNODEFICIENCY DUE TO DRUGS: Primary | ICD-10-CM

## 2025-08-07 PROCEDURE — 3044F HG A1C LEVEL LT 7.0%: CPT | Mod: CPTII,HCNC,S$GLB, | Performed by: INTERNAL MEDICINE

## 2025-08-07 PROCEDURE — 4010F ACE/ARB THERAPY RXD/TAKEN: CPT | Mod: CPTII,HCNC,S$GLB, | Performed by: INTERNAL MEDICINE

## 2025-08-07 PROCEDURE — 3074F SYST BP LT 130 MM HG: CPT | Mod: CPTII,HCNC,S$GLB, | Performed by: INTERNAL MEDICINE

## 2025-08-07 PROCEDURE — 3008F BODY MASS INDEX DOCD: CPT | Mod: CPTII,HCNC,S$GLB, | Performed by: INTERNAL MEDICINE

## 2025-08-07 PROCEDURE — 99999 PR PBB SHADOW E&M-EST. PATIENT-LVL V: CPT | Mod: PBBFAC,HCNC,, | Performed by: INTERNAL MEDICINE

## 2025-08-07 PROCEDURE — 96401 CHEMO ANTI-NEOPL SQ/IM: CPT | Mod: HCNC

## 2025-08-07 PROCEDURE — 1159F MED LIST DOCD IN RCRD: CPT | Mod: CPTII,HCNC,S$GLB, | Performed by: INTERNAL MEDICINE

## 2025-08-07 PROCEDURE — 99215 OFFICE O/P EST HI 40 MIN: CPT | Mod: HCNC,S$GLB,, | Performed by: INTERNAL MEDICINE

## 2025-08-07 PROCEDURE — 63600175 PHARM REV CODE 636 W HCPCS: Mod: JZ,TB,HCNC

## 2025-08-07 PROCEDURE — 3078F DIAST BP <80 MM HG: CPT | Mod: CPTII,HCNC,S$GLB, | Performed by: INTERNAL MEDICINE

## 2025-08-07 RX ORDER — BORTEZOMIB 3.5 MG/1
1 INJECTION, POWDER, LYOPHILIZED, FOR SOLUTION INTRAVENOUS; SUBCUTANEOUS
Status: COMPLETED | OUTPATIENT
Start: 2025-08-07 | End: 2025-08-07

## 2025-08-07 RX ORDER — PROCHLORPERAZINE EDISYLATE 5 MG/ML
10 INJECTION INTRAMUSCULAR; INTRAVENOUS ONCE AS NEEDED
Status: DISCONTINUED | OUTPATIENT
Start: 2025-08-07 | End: 2025-08-07 | Stop reason: HOSPADM

## 2025-08-07 RX ORDER — EPINEPHRINE 0.3 MG/.3ML
0.3 INJECTION SUBCUTANEOUS ONCE AS NEEDED
Status: DISCONTINUED | OUTPATIENT
Start: 2025-08-07 | End: 2025-08-07 | Stop reason: HOSPADM

## 2025-08-07 RX ORDER — DIPHENHYDRAMINE HYDROCHLORIDE 50 MG/ML
50 INJECTION, SOLUTION INTRAMUSCULAR; INTRAVENOUS ONCE AS NEEDED
Status: DISCONTINUED | OUTPATIENT
Start: 2025-08-07 | End: 2025-08-07 | Stop reason: HOSPADM

## 2025-08-07 RX ORDER — PROCHLORPERAZINE MALEATE 5 MG
10 TABLET ORAL EVERY 6 HOURS PRN
Qty: 20 TABLET | Refills: 5 | Status: SHIPPED | OUTPATIENT
Start: 2025-08-07

## 2025-08-07 RX ADMIN — BORTEXOMIB 2.5 MG: 3.5 INJECTION, POWDER, LYOPHILIZED, FOR SOLUTION INTRAVENOUS; SUBCUTANEOUS at 04:08

## 2025-08-07 RX ADMIN — DARATUMUMAB AND HYALURONIDASE-FIHJ (HUMAN RECOMBINANT) 1800 MG: 1800; 30000 INJECTION SUBCUTANEOUS at 04:08

## 2025-08-07 NOTE — PROGRESS NOTES
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 Her co-morbidities include CHB c/b cardiac arrest s/p CRT-D, AFib on Xarelto, HFrEF with improved EF (LVEF 50-55%), prior CVA, HTN, depression, SHIRA on CPAP.     Oncologic History:   She was hospitalized from 1/29 - 2/02 with left upper quadrant pain and was found to have small bowel enteritis. She improved with antibiotics and symptomatic care. During hospitalization, myeloma labs were ordered to assess elevated protein gap. She was found to have IgG lambda with M protein of 3.43 g/dL and subsequently referred to Hematology clinic. Since hospitalization, she has been feeling overall well. She denies fevers, night sweats, weight loss, bone pain.   2/2/2025: Elevated M protein noted 3.43 g/dL with ANALI showing IgG lambda, normal FLC, mild anemia  Pathology:  Bone marrow biopsy  done on 3/10/25. 60% clonal plasma cells. FISH pending.  4/2/24: PET CT with no hypermetabolic lesions concerning for active disease.  3/27/25: initiated lion-VRd    Interval History 07/24/2025:  Patient here for follow up prior to cycle 5 of lion-VRd. Her restaging is planned for 7/30/25 and follow up with Dr. Porter. She reports that the bottom of her feet are getting numb and there are some dark spots noted, no swelling. She notices a runny nose for 2 days after her treatment but no other notable symptoms. Denies fever, chills, chest pain, shortness of breath, new/worsening bone pain, adenopathy, N/V/D.     INTERVAL 8/7/25  She reports she is having increased numbness in the feet bilaterally but no pain. She states her taste is pretty altered so she isn't eating as much as a result.   Her recent BMBx showed MRD-negativity.  Discussed recommendation to complete C#6 of Manisha-VRd and to proceed thereafter to maintenance with darzalex+lenalidomide. She is not a transplant candidate due to history of cardiac disease.     Past Medical History, Social History, and Past Family History are unchanged since last evaluation except for HPI.    Past Medical History:   Diagnosis Date    Anticoagulant long-term use     Anxiety     Asthma     Atrial fibrillation     Brain anoxic injury     Cervicalgia 8/28/2014    CHI (closed head injury) 2/19/2013    Convulsion 5/30/2015    Decreased ROM of left shoulder 4/12/2017    Defibrillator activation 2013    Depression     Heart block     History of sudden cardiac arrest 2/2013    PEA arrest with subsequent long-QT    Hx of psychiatric care     Hypertension     Left atrial enlargement 4/11/2018    Pacemaker 2013    Paresthesia 11/1/2013    Prolonged Q-T interval on ECG 2/8/2013    Psychiatric problem     Seizures     Sleep difficulties     Stroke     weakness lt side    Therapy     Thyroid disease     Upper airway resistance syndrome 2/21/2017        CURRENT MEDICATIONS:   Current Outpatient Medications   Medication Sig    acyclovir (ZOVIRAX) 400 MG tablet Take 1 tablet (400 mg total) by mouth 2 (two) times daily.    amLODIPine (NORVASC) 5 MG tablet Take 5 mg by mouth.    ARIPiprazole (ABILIFY) 15 MG Tab Take 15 mg by mouth.    carvediloL (COREG) 12.5 MG tablet Take 1 tablet (12.5 mg total) by mouth 2 (two) times daily.    clonazePAM (KLONOPIN) 1 MG tablet TAKE 1 TABLET BY MOUTH DAILY AS NEEDED FOR ANXIETY    dexAMETHasone (DECADRON) 4 MG Tab Take 10 tablets (40 mg total) by mouth every 7 days. Take with food.    dicyclomine (BENTYL) 10 MG capsule TAKE 1 CAPSULE(10 MG) BY MOUTH TWICE DAILY AS NEEDED FOR ABDOMINAL CRAMPS    ergocalciferol (ERGOCALCIFEROL) 50,000 unit Cap Take 1 capsule (50,000 Units total) by mouth every 7 days.    furosemide (LASIX) 20 MG tablet Take 1 tablet (20 mg total) by mouth daily as  needed. Take as needed for weight gain (2-3 lbs/day or 5 lbs/week), shortness of breath, or leg swelling    lenalidomide 25 mg Cap Take 1 capsule by mouth on days 1-21 of a 28 day cycle. [facility] [phone] 6/30/25.    LIDOcaine (LIDODERM) 5 % Place 1 patch onto the skin once daily. Remove & Discard patch within 12 hours or as directed by MD    losartan (COZAAR) 100 MG tablet Take 1 tablet (100 mg total) by mouth once daily.    omeprazole (PRILOSEC) 40 MG capsule Take 1 capsule (40 mg total) by mouth once daily. Take 30 minutes before breakfast    ondansetron (ZOFRAN-ODT) 4 MG TbDL Dissolve 2 tablets (8 mg total) by mouth every 6 (six) hours as needed.    polyethylene glycol (GLYCOLAX) 17 gram PwPk Take 17 g by mouth once daily.    potassium chloride SA (K-DUR,KLOR-CON) 20 MEQ tablet Take 1 tablet (20 mEq total) by mouth once daily.    rivaroxaban (XARELTO) 20 mg Tab Take 1 tablet (20 mg total) by mouth daily with dinner or evening meal.    rosuvastatin (CRESTOR) 40 MG Tab Take 1 tablet (40 mg total) by mouth every evening.    sertraline (ZOLOFT) 100 MG tablet Take 100 mg by mouth 2 (two) times daily.    tiaGABine (GABITRIL) 4 MG tablet Take 1 tablet (4 mg total) by mouth every evening.    tiZANidine (ZANAFLEX) 4 MG tablet Take by mouth 2 (two) times daily.    zolpidem (AMBIEN) 10 mg Tab Take 1 tablet (10 mg total) by mouth nightly as needed (insomnia).    EPINEPHrine (EPIPEN) 0.3 mg/0.3 mL AtIn Inject 0.3 mLs (0.3 mg total) into the muscle once. for 1 dose    prochlorperazine (COMPAZINE) 5 MG tablet Take 2 tablets (10 mg total) by mouth every 6 (six) hours as needed for Nausea.     Current Facility-Administered Medications   Medication    cyanocobalamin tablet 100 mcg    onabotulinumtoxina injection 200 Units    onabotulinumtoxina injection 200 Units    onabotulinumtoxina injection 200 Units    onabotulinumtoxina injection 200 Units    onabotulinumtoxina injection 200 Units    onabotulinumtoxina injection 200 Units     onabotulinumtoxina injection 200 Units    onabotulinumtoxina injection 200 Units     Facility-Administered Medications Ordered in Other Visits   Medication    lactated ringers infusion    lidocaine (PF) 10 mg/ml (1%) injection 5 mg       ALLERGIES:   Review of patient's allergies indicates:   Allergen Reactions    Aspirin Hives    Imitrex [sumatriptan] Palpitations    Penicillins Hives and Swelling    Shellfish containing products Anaphylaxis     seafood    Reglan [metoclopramide hcl] Other (See Comments)     Parkinsonism        Review of Systems:     Review of Systems   Constitutional:  Negative for chills, fever, malaise/fatigue and weight loss.   HENT:  Negative for congestion, nosebleeds, sinus pain and sore throat.    Eyes:  Negative for blurred vision, double vision, photophobia and pain.   Respiratory:  Negative for cough and shortness of breath.    Cardiovascular:  Negative for chest pain and palpitations.   Gastrointestinal:  Negative for constipation, diarrhea, heartburn, nausea and vomiting.   Genitourinary:  Negative for dysuria and hematuria.   Musculoskeletal:  Negative for back pain and falls.   Skin:  Negative for rash.   Neurological:  Negative for dizziness, sensory change, weakness and headaches.   Endo/Heme/Allergies:  Negative for environmental allergies.   Psychiatric/Behavioral:  The patient does not have insomnia.        Physical Exam:     Vitals:    08/07/25 1613   BP: 128/60   Pulse: 80   Temp: 98.2 °F (36.8 °C)         Physical Exam  Vitals reviewed.   Constitutional:       General: She is not in acute distress.     Appearance: Normal appearance. She is obese. She is not ill-appearing.   HENT:      Head: Normocephalic and atraumatic.      Nose: Nose normal.      Mouth/Throat:      Mouth: Mucous membranes are moist.      Pharynx: Oropharynx is clear. No oropharyngeal exudate.   Eyes:      Pupils: Pupils are equal, round, and reactive to light.   Cardiovascular:      Rate and Rhythm: Normal  rate and regular rhythm.      Heart sounds: No murmur heard.  Pulmonary:      Effort: Pulmonary effort is normal.      Breath sounds: Normal breath sounds. No wheezing.   Abdominal:      General: Bowel sounds are normal.      Palpations: Abdomen is soft.      Tenderness: There is no abdominal tenderness.   Musculoskeletal:         General: Normal range of motion.      Cervical back: Normal range of motion and neck supple.      Right lower leg: No edema.      Left lower leg: No edema.   Skin:     General: Skin is warm and dry.      Capillary Refill: Capillary refill takes less than 2 seconds.      Findings: No bruising, lesion or rash.   Neurological:      Mental Status: She is alert and oriented to person, place, and time.      Motor: No weakness.   Psychiatric:         Mood and Affect: Mood normal.         Behavior: Behavior normal.         Thought Content: Thought content normal.          ECOG Performance Status: (foot note - ECOG PS provided by Eastern Cooperative Oncology Group) 1 - Symptomatic but completely ambulatory    Karnofsky Performance Score:  90%- Able to Carry on Normal Activity: Minor Symptoms of Disease      Labs:   Lab Results   Component Value Date    WBC 4.12 2025    HGB 10.8 (L) 2025    HCT 31.7 (L) 2025    MCV 85 2025     2025       Lab Results   Component Value Date     2025    K 3.1 (L) 2025     (H) 2025    CO2 26 2025    BUN 10 2025    CREATININE 0.7 2025    ALBUMIN 3.3 (L) 2025    BILITOT 0.6 2025    ALKPHOS 55 2025    AST 10 (L) 2025    ALT 13 2025       Imagin/2/25 WB PET CT  Impression:  History of recently diagnosed myeloma.  No hypermetabolic lesions concerning for active disease.  Nonspecific hypermetabolic lymph nodes in the head and neck.  Attention on follow-up.    Pathology:  3/10/25 Bone Marrow Biopsy  RIGHT ILIAC CREST BONE MARROW ASPIRATE, BONE MARROW CLOT,  AND BONE MARROW CORE BIOPSY WITH:     CELLULARITY= 70-80%, TRILINEAGE HEMATOPOIETIC ACTIVITY (M/E= 2.9:1).   PLASMA CELL NEOPLASM (65%).  SEE COMMENT.   GRADE I RETICULAR FIBROSIS.   STORAGE IRON NOT IDENTIFIED.   CONGO RED NEGATIVE.   ADEQUATE NUMBER OF MEGAKARYOCYTES.        Assessment and Plan:     Multiple myeloma not having achieved remission  - IgG lambda multiple myeloma, standard-risk by FISH, R-ISS Stage II  - Pre-treatment labs: m-protein 3.43 g/dL, lambda sFLC 6.43  - Normal calcium and renal function  - Patient is not a transplant candidate due to extensive cardiac history, per Dr. De La Garza  - 3/27/25: initiated lion-VRd, 25 mg lenalidomide days 1-21 and dexamethasone 40 mg weekly, tolerating well  - 6/17/25 biochemical studies with continued decrease in m-spike with VGPR prior to cycle 4  - Biochemical studies pending today, will contact patient with results.  - Cycle 4, day 1 held for infectious symptoms, resume C4D1 on 6/26/25  - PBMBx 7/30/25 MRD-negative, PET CT 7/30/25 RONY  - Will dose-reduce velcade for C#6  -would plan to proceed with darzalex and lenalidomide maintenance starting at C#7. She has tolerated revlimid 25 well so could start maintenance at 15mg.    Chemotherapy Induced Neuropathy  - Bilateral foot numbness, dose-reduced velcade to 1 mg/m2 for neuropathy previously--will futher dose-reduce for C#6, and then it will be discontinued thereafter.    Immunodeficiency due to drugs  - Secondary to treatment, continue acyclovir two times daily for shingles prophylaxis    Other thrombophilia  - Secondary to MM and revlimid, continue xarelto daily for VTE prophylaxis    Anemia in neoplastic disease  - Secondary to multiple myeloma, stable, asymptomatic    Chemo Induced Diarrhea  - Mild, manageable diarrhea with treatment, no problems today    Vitamin D deficiency  - Noted to be deficient on recent labs  - Continue ergocalciferol 50,000 units weekly    Obesity  - Long-standing, multiple chronic  co-morbidities  - Ambulatory, no difficulty performing ADLs    Hypokalemia  - Mild, secondary to altered taste  - Continue potassium supplement 20 mEq daily two times daily for next 30 days    Altered Taste  - Secondary to chemotherapy, weight is overall stable  - [facility] dietary handout provided for taste alteration  - Offered dietician consult but likely not to help with this type of alteration     BMT Route Chart for Scheduling     Orders Placed:      Orders Placed This Encounter    prochlorperazine (COMPAZINE) 5 MG tablet          Milo Porter MD, FACP  Hematology & Medical Oncology  [facility]      High Risk Conditions:    Patient has a condition that poses threat to life and bodily function: multipel myeloma  Patient is currently on drug therapy requiring intensive monitoring for toxicity: tri

## 2025-08-07 NOTE — NURSING
Pt given SubQ Manisha/Velcade injections. Tolerated well. Care explained, all questions answered.

## 2025-08-08 DIAGNOSIS — C90.00 MULTIPLE MYELOMA NOT HAVING ACHIEVED REMISSION: ICD-10-CM

## 2025-08-08 RX ORDER — LENALIDOMIDE 25 MG/1
CAPSULE ORAL
Qty: 21 CAPSULE | Refills: 0 | OUTPATIENT
Start: 2025-08-08 | End: 2026-08-08

## 2025-08-11 DIAGNOSIS — C90.00 MULTIPLE MYELOMA NOT HAVING ACHIEVED REMISSION: ICD-10-CM

## 2025-08-11 RX ORDER — LENALIDOMIDE 15 MG/1
1 CAPSULE ORAL DAILY
Qty: 21 CAPSULE | Refills: 0 | Status: SHIPPED | OUTPATIENT
Start: 2025-08-11 | End: 2026-08-11

## 2025-08-11 RX ORDER — LENALIDOMIDE 15 MG/1
1 CAPSULE ORAL DAILY
COMMUNITY
End: 2025-08-11 | Stop reason: SDUPTHER

## 2025-08-12 ENCOUNTER — TELEPHONE (OUTPATIENT)
Dept: HEMATOLOGY/ONCOLOGY | Facility: CLINIC | Age: 60
End: 2025-08-12
Payer: MEDICARE

## 2025-08-13 ENCOUNTER — ON-DEMAND VIRTUAL (OUTPATIENT)
Dept: URGENT CARE | Facility: CLINIC | Age: 60
End: 2025-08-13
Payer: MEDICARE

## 2025-08-13 DIAGNOSIS — H10.32 ACUTE BACTERIAL CONJUNCTIVITIS OF LEFT EYE: Primary | ICD-10-CM

## 2025-08-13 RX ORDER — POLYMYXIN B SULFATE AND TRIMETHOPRIM 1; 10000 MG/ML; [USP'U]/ML
1 SOLUTION OPHTHALMIC 3 TIMES DAILY
Qty: 10 ML | Refills: 0 | Status: SHIPPED | OUTPATIENT
Start: 2025-08-13 | End: 2025-08-20

## 2025-08-14 ENCOUNTER — INFUSION (OUTPATIENT)
Dept: INFUSION THERAPY | Facility: HOSPITAL | Age: 60
End: 2025-08-14
Payer: MEDICARE

## 2025-08-14 VITALS
WEIGHT: 265 LBS | OXYGEN SATURATION: 100 % | RESPIRATION RATE: 18 BRPM | SYSTOLIC BLOOD PRESSURE: 154 MMHG | HEIGHT: 64 IN | HEART RATE: 79 BPM | BODY MASS INDEX: 45.24 KG/M2 | TEMPERATURE: 98 F | DIASTOLIC BLOOD PRESSURE: 87 MMHG

## 2025-08-14 DIAGNOSIS — C90.00 MULTIPLE MYELOMA NOT HAVING ACHIEVED REMISSION: Primary | ICD-10-CM

## 2025-08-14 PROCEDURE — 63600175 PHARM REV CODE 636 W HCPCS: Mod: HCNC | Performed by: INTERNAL MEDICINE

## 2025-08-14 PROCEDURE — 96401 CHEMO ANTI-NEOPL SQ/IM: CPT | Mod: HCNC

## 2025-08-14 RX ORDER — EPINEPHRINE 0.3 MG/.3ML
0.3 INJECTION SUBCUTANEOUS ONCE AS NEEDED
Status: DISCONTINUED | OUTPATIENT
Start: 2025-08-14 | End: 2025-08-14 | Stop reason: HOSPADM

## 2025-08-14 RX ORDER — SODIUM CHLORIDE 0.9 % (FLUSH) 0.9 %
10 SYRINGE (ML) INJECTION
Status: DISCONTINUED | OUTPATIENT
Start: 2025-08-14 | End: 2025-08-14 | Stop reason: HOSPADM

## 2025-08-14 RX ORDER — DIPHENHYDRAMINE HYDROCHLORIDE 50 MG/ML
50 INJECTION, SOLUTION INTRAMUSCULAR; INTRAVENOUS ONCE AS NEEDED
Status: DISCONTINUED | OUTPATIENT
Start: 2025-08-14 | End: 2025-08-14 | Stop reason: HOSPADM

## 2025-08-14 RX ORDER — BORTEZOMIB 3.5 MG/1
0.7 INJECTION, POWDER, LYOPHILIZED, FOR SOLUTION INTRAVENOUS; SUBCUTANEOUS
Status: CANCELLED | OUTPATIENT
Start: 2025-08-14

## 2025-08-14 RX ORDER — PROCHLORPERAZINE EDISYLATE 5 MG/ML
10 INJECTION INTRAMUSCULAR; INTRAVENOUS ONCE AS NEEDED
Status: DISCONTINUED | OUTPATIENT
Start: 2025-08-14 | End: 2025-08-14 | Stop reason: HOSPADM

## 2025-08-14 RX ORDER — HEPARIN 100 UNIT/ML
500 SYRINGE INTRAVENOUS
Status: DISCONTINUED | OUTPATIENT
Start: 2025-08-14 | End: 2025-08-14 | Stop reason: HOSPADM

## 2025-08-14 RX ORDER — BORTEZOMIB 3.5 MG/1
0.7 INJECTION, POWDER, LYOPHILIZED, FOR SOLUTION INTRAVENOUS; SUBCUTANEOUS
Status: COMPLETED | OUTPATIENT
Start: 2025-08-14 | End: 2025-08-14

## 2025-08-14 RX ADMIN — BORTEXOMIB 1.75 MG: 3.5 INJECTION, POWDER, LYOPHILIZED, FOR SOLUTION INTRAVENOUS; SUBCUTANEOUS at 11:08

## 2025-08-20 ENCOUNTER — LAB VISIT (OUTPATIENT)
Dept: LAB | Facility: HOSPITAL | Age: 60
End: 2025-08-20
Payer: MEDICARE

## 2025-08-20 DIAGNOSIS — C90.00 MULTIPLE MYELOMA NOT HAVING ACHIEVED REMISSION: ICD-10-CM

## 2025-08-20 DIAGNOSIS — E87.6 HYPOKALEMIA: ICD-10-CM

## 2025-08-20 LAB
ABSOLUTE EOSINOPHIL (OHS): 0.01 K/UL
ABSOLUTE MONOCYTE (OHS): 0.69 K/UL (ref 0.3–1)
ABSOLUTE NEUTROPHIL COUNT (OHS): 2.23 K/UL (ref 1.8–7.7)
ALBUMIN SERPL BCP-MCNC: 3.4 G/DL (ref 3.5–5.2)
ALP SERPL-CCNC: 58 UNIT/L (ref 40–150)
ALT SERPL W/O P-5'-P-CCNC: 14 UNIT/L (ref 0–55)
ANION GAP (OHS): 7 MMOL/L (ref 8–16)
AST SERPL-CCNC: 12 UNIT/L (ref 0–50)
BASOPHILS # BLD AUTO: 0.01 K/UL
BASOPHILS NFR BLD AUTO: 0.2 %
BILIRUB SERPL-MCNC: 0.4 MG/DL (ref 0.1–1)
BUN SERPL-MCNC: 9 MG/DL (ref 6–20)
CALCIUM SERPL-MCNC: 9.2 MG/DL (ref 8.7–10.5)
CHLORIDE SERPL-SCNC: 111 MMOL/L (ref 95–110)
CO2 SERPL-SCNC: 25 MMOL/L (ref 23–29)
CREAT SERPL-MCNC: 0.8 MG/DL (ref 0.5–1.4)
ERYTHROCYTE [DISTWIDTH] IN BLOOD BY AUTOMATED COUNT: 19 % (ref 11.5–14.5)
GFR SERPLBLD CREATININE-BSD FMLA CKD-EPI: >60 ML/MIN/1.73/M2
GLUCOSE SERPL-MCNC: 102 MG/DL (ref 70–110)
HCT VFR BLD AUTO: 34.7 % (ref 37–48.5)
HGB BLD-MCNC: 11.9 GM/DL (ref 12–16)
IGA SERPL-MCNC: 47 MG/DL (ref 40–350)
IGG SERPL-MCNC: 536 MG/DL (ref 650–1600)
IGM SERPL-MCNC: 26 MG/DL (ref 50–300)
IMM GRANULOCYTES # BLD AUTO: 0.01 K/UL (ref 0–0.04)
IMM GRANULOCYTES NFR BLD AUTO: 0.2 % (ref 0–0.5)
LYMPHOCYTES # BLD AUTO: 1.33 K/UL (ref 1–4.8)
MCH RBC QN AUTO: 29.9 PG (ref 27–31)
MCHC RBC AUTO-ENTMCNC: 34.3 G/DL (ref 32–36)
MCV RBC AUTO: 87 FL (ref 82–98)
NUCLEATED RBC (/100WBC) (OHS): 0 /100 WBC
PLATELET # BLD AUTO: 208 K/UL (ref 150–450)
PMV BLD AUTO: ABNORMAL FL
POTASSIUM SERPL-SCNC: 3.3 MMOL/L (ref 3.5–5.1)
PROT SERPL-MCNC: 6 GM/DL (ref 6–8.4)
RBC # BLD AUTO: 3.98 M/UL (ref 4–5.4)
RELATIVE EOSINOPHIL (OHS): 0.2 %
RELATIVE LYMPHOCYTE (OHS): 31.1 % (ref 18–48)
RELATIVE MONOCYTE (OHS): 16.1 % (ref 4–15)
RELATIVE NEUTROPHIL (OHS): 52.2 % (ref 38–73)
SODIUM SERPL-SCNC: 143 MMOL/L (ref 136–145)
WBC # BLD AUTO: 4.28 K/UL (ref 3.9–12.7)

## 2025-08-20 PROCEDURE — 86334 IMMUNOFIX E-PHORESIS SERUM: CPT | Mod: HCNC

## 2025-08-20 PROCEDURE — 82040 ASSAY OF SERUM ALBUMIN: CPT | Mod: HCNC

## 2025-08-20 PROCEDURE — 83521 IG LIGHT CHAINS FREE EACH: CPT | Mod: HCNC

## 2025-08-20 PROCEDURE — 36415 COLL VENOUS BLD VENIPUNCTURE: CPT | Mod: HCNC

## 2025-08-20 PROCEDURE — 84165 PROTEIN E-PHORESIS SERUM: CPT | Mod: 26,HCNC,, | Performed by: PATHOLOGY

## 2025-08-20 PROCEDURE — 84165 PROTEIN E-PHORESIS SERUM: CPT | Mod: HCNC

## 2025-08-20 PROCEDURE — 85025 COMPLETE CBC W/AUTO DIFF WBC: CPT | Mod: HCNC

## 2025-08-20 PROCEDURE — 86334 IMMUNOFIX E-PHORESIS SERUM: CPT | Mod: 26,HCNC,, | Performed by: PATHOLOGY

## 2025-08-20 PROCEDURE — 82784 ASSAY IGA/IGD/IGG/IGM EACH: CPT | Mod: 59,HCNC

## 2025-08-20 RX ORDER — DIPHENHYDRAMINE HYDROCHLORIDE 50 MG/ML
50 INJECTION, SOLUTION INTRAMUSCULAR; INTRAVENOUS ONCE AS NEEDED
OUTPATIENT
Start: 2025-08-20 | End: 2037-01-16

## 2025-08-20 RX ORDER — EPINEPHRINE 0.3 MG/.3ML
0.3 INJECTION SUBCUTANEOUS ONCE AS NEEDED
OUTPATIENT
Start: 2025-08-20 | End: 2037-01-16

## 2025-08-20 RX ORDER — EPINEPHRINE 0.3 MG/.3ML
0.3 INJECTION SUBCUTANEOUS ONCE AS NEEDED
Status: CANCELLED | OUTPATIENT
Start: 2025-08-20 | End: 2037-01-16

## 2025-08-20 RX ORDER — PROCHLORPERAZINE EDISYLATE 5 MG/ML
10 INJECTION INTRAMUSCULAR; INTRAVENOUS ONCE AS NEEDED
Status: CANCELLED | OUTPATIENT
Start: 2025-08-20

## 2025-08-20 RX ORDER — PROCHLORPERAZINE EDISYLATE 5 MG/ML
10 INJECTION INTRAMUSCULAR; INTRAVENOUS ONCE AS NEEDED
OUTPATIENT
Start: 2025-08-20

## 2025-08-20 RX ORDER — BORTEZOMIB 3.5 MG/1
0.7 INJECTION, POWDER, LYOPHILIZED, FOR SOLUTION INTRAVENOUS; SUBCUTANEOUS
OUTPATIENT
Start: 2025-08-20 | End: 2025-08-20

## 2025-08-20 RX ORDER — SODIUM CHLORIDE 0.9 % (FLUSH) 0.9 %
10 SYRINGE (ML) INJECTION
OUTPATIENT
Start: 2025-08-20

## 2025-08-20 RX ORDER — POTASSIUM CHLORIDE 20 MEQ/1
20 TABLET, EXTENDED RELEASE ORAL DAILY
Qty: 30 TABLET | Refills: 2 | Status: SHIPPED | OUTPATIENT
Start: 2025-08-20

## 2025-08-20 RX ORDER — HEPARIN 100 UNIT/ML
500 SYRINGE INTRAVENOUS
OUTPATIENT
Start: 2025-08-20

## 2025-08-20 RX ORDER — SODIUM CHLORIDE 0.9 % (FLUSH) 0.9 %
10 SYRINGE (ML) INJECTION
Status: CANCELLED | OUTPATIENT
Start: 2025-08-20

## 2025-08-20 RX ORDER — DIPHENHYDRAMINE HYDROCHLORIDE 50 MG/ML
50 INJECTION, SOLUTION INTRAMUSCULAR; INTRAVENOUS ONCE AS NEEDED
Status: CANCELLED | OUTPATIENT
Start: 2025-08-20 | End: 2037-01-16

## 2025-08-20 RX ORDER — BORTEZOMIB 3.5 MG/1
0.7 INJECTION, POWDER, LYOPHILIZED, FOR SOLUTION INTRAVENOUS; SUBCUTANEOUS
Status: CANCELLED | OUTPATIENT
Start: 2025-08-21 | End: 2025-08-20

## 2025-08-20 RX ORDER — HEPARIN 100 UNIT/ML
500 SYRINGE INTRAVENOUS
Status: CANCELLED | OUTPATIENT
Start: 2025-08-20

## 2025-08-21 ENCOUNTER — INFUSION (OUTPATIENT)
Dept: INFUSION THERAPY | Facility: HOSPITAL | Age: 60
End: 2025-08-21
Payer: MEDICARE

## 2025-08-21 VITALS
SYSTOLIC BLOOD PRESSURE: 144 MMHG | RESPIRATION RATE: 16 BRPM | DIASTOLIC BLOOD PRESSURE: 80 MMHG | HEART RATE: 68 BPM | WEIGHT: 267 LBS | BODY MASS INDEX: 45.58 KG/M2 | OXYGEN SATURATION: 100 % | HEIGHT: 64 IN | TEMPERATURE: 99 F

## 2025-08-21 DIAGNOSIS — C90.00 MULTIPLE MYELOMA NOT HAVING ACHIEVED REMISSION: Primary | ICD-10-CM

## 2025-08-21 LAB
ALBUMIN, SPE (OHS): 3.33 G/DL (ref 3.35–5.55)
ALPHA 1 GLOB (OHS): 0.3 GM/DL (ref 0.17–0.41)
ALPHA 2 GLOB (OHS): 0.72 GM/DL (ref 0.43–0.99)
BETA GLOB (OHS): 0.68 GM/DL (ref 0.5–1.1)
GAMMA GLOBULIN (OHS): 0.47 GM/DL (ref 0.67–1.58)
KAPPA LC FREE SER-MCNC: 1.23 MG/L (ref 0.26–1.65)
KAPPA LC FREE/LAMBDA FREE SER: 0.37 MG/DL (ref 0.33–1.94)
LAMBDA LC FREE SERPL-MCNC: 0.3 MG/DL (ref 0.57–2.63)
PATHOLOGIST INTERPRETATION - IFE SERUM (OHS): NORMAL
PATHOLOGIST REVIEW - SPE (OHS): NORMAL
PROT SERPL-MCNC: 5.5 GM/DL (ref 6–8.4)

## 2025-08-21 PROCEDURE — 63600175 PHARM REV CODE 636 W HCPCS: Mod: HCNC

## 2025-08-21 PROCEDURE — 96401 CHEMO ANTI-NEOPL SQ/IM: CPT | Mod: HCNC

## 2025-08-21 RX ORDER — PROCHLORPERAZINE EDISYLATE 5 MG/ML
10 INJECTION INTRAMUSCULAR; INTRAVENOUS ONCE AS NEEDED
Status: DISCONTINUED | OUTPATIENT
Start: 2025-08-21 | End: 2025-08-21 | Stop reason: HOSPADM

## 2025-08-21 RX ORDER — DIPHENHYDRAMINE HYDROCHLORIDE 50 MG/ML
50 INJECTION, SOLUTION INTRAMUSCULAR; INTRAVENOUS ONCE AS NEEDED
Status: DISCONTINUED | OUTPATIENT
Start: 2025-08-21 | End: 2025-08-21 | Stop reason: HOSPADM

## 2025-08-21 RX ORDER — EPINEPHRINE 0.3 MG/.3ML
0.3 INJECTION SUBCUTANEOUS ONCE AS NEEDED
Status: DISCONTINUED | OUTPATIENT
Start: 2025-08-21 | End: 2025-08-21 | Stop reason: HOSPADM

## 2025-08-21 RX ORDER — BORTEZOMIB 3.5 MG/1
0.7 INJECTION, POWDER, LYOPHILIZED, FOR SOLUTION INTRAVENOUS; SUBCUTANEOUS
Status: COMPLETED | OUTPATIENT
Start: 2025-08-21 | End: 2025-08-21

## 2025-08-21 RX ADMIN — BORTEXOMIB 1.75 MG: 3.5 INJECTION, POWDER, LYOPHILIZED, FOR SOLUTION INTRAVENOUS; SUBCUTANEOUS at 10:08

## 2025-08-21 RX ADMIN — DARATUMUMAB AND HYALURONIDASE-FIHJ (HUMAN RECOMBINANT) 1800 MG: 1800; 30000 INJECTION SUBCUTANEOUS at 10:08

## 2025-08-26 ENCOUNTER — OFFICE VISIT (OUTPATIENT)
Dept: INTERNAL MEDICINE | Facility: CLINIC | Age: 60
End: 2025-08-26
Payer: MEDICARE

## 2025-08-26 VITALS
BODY MASS INDEX: 46.26 KG/M2 | DIASTOLIC BLOOD PRESSURE: 70 MMHG | WEIGHT: 270.94 LBS | HEART RATE: 73 BPM | OXYGEN SATURATION: 99 % | HEIGHT: 64 IN | SYSTOLIC BLOOD PRESSURE: 134 MMHG

## 2025-08-26 DIAGNOSIS — E78.2 MIXED HYPERLIPIDEMIA: ICD-10-CM

## 2025-08-26 DIAGNOSIS — I42.8 NONISCHEMIC CARDIOMYOPATHY: ICD-10-CM

## 2025-08-26 DIAGNOSIS — I10 ESSENTIAL HYPERTENSION: Primary | ICD-10-CM

## 2025-08-26 DIAGNOSIS — E04.1 THYROID NODULE: ICD-10-CM

## 2025-08-26 DIAGNOSIS — F06.70 MILD NEUROCOGNITIVE DISORDER DUE TO MULTIPLE ETIOLOGIES: ICD-10-CM

## 2025-08-26 DIAGNOSIS — Z86.73 HISTORY OF CVA (CEREBROVASCULAR ACCIDENT): ICD-10-CM

## 2025-08-26 DIAGNOSIS — D51.9 ANEMIA DUE TO VITAMIN B12 DEFICIENCY, UNSPECIFIED B12 DEFICIENCY TYPE: ICD-10-CM

## 2025-08-26 DIAGNOSIS — F33.1 DEPRESSION, MAJOR, RECURRENT, MODERATE: ICD-10-CM

## 2025-08-26 DIAGNOSIS — I48.0 PAROXYSMAL ATRIAL FIBRILLATION: ICD-10-CM

## 2025-08-26 DIAGNOSIS — C90.00 MULTIPLE MYELOMA NOT HAVING ACHIEVED REMISSION: ICD-10-CM

## 2025-08-26 DIAGNOSIS — E66.813 CLASS 3 SEVERE OBESITY DUE TO EXCESS CALORIES WITH SERIOUS COMORBIDITY AND BODY MASS INDEX (BMI) OF 45.0 TO 49.9 IN ADULT: ICD-10-CM

## 2025-08-26 DIAGNOSIS — Z23 NEED FOR VACCINATION: ICD-10-CM

## 2025-08-26 DIAGNOSIS — Z12.31 SCREENING MAMMOGRAM, ENCOUNTER FOR: ICD-10-CM

## 2025-08-26 DIAGNOSIS — R73.03 PREDIABETES: ICD-10-CM

## 2025-08-26 PROBLEM — K52.9 GASTROENTERITIS: Status: RESOLVED | Noted: 2025-01-29 | Resolved: 2025-08-26

## 2025-08-26 PROCEDURE — 99214 OFFICE O/P EST MOD 30 MIN: CPT | Mod: HCNC,S$GLB,, | Performed by: INTERNAL MEDICINE

## 2025-08-26 PROCEDURE — 3078F DIAST BP <80 MM HG: CPT | Mod: CPTII,HCNC,S$GLB, | Performed by: INTERNAL MEDICINE

## 2025-08-26 PROCEDURE — 3008F BODY MASS INDEX DOCD: CPT | Mod: CPTII,HCNC,S$GLB, | Performed by: INTERNAL MEDICINE

## 2025-08-26 PROCEDURE — 3075F SYST BP GE 130 - 139MM HG: CPT | Mod: CPTII,HCNC,S$GLB, | Performed by: INTERNAL MEDICINE

## 2025-08-26 PROCEDURE — 90661 CCIIV3 VAC ABX FR 0.5 ML IM: CPT | Mod: HCNC,S$GLB,, | Performed by: INTERNAL MEDICINE

## 2025-08-26 PROCEDURE — 3044F HG A1C LEVEL LT 7.0%: CPT | Mod: CPTII,HCNC,S$GLB, | Performed by: INTERNAL MEDICINE

## 2025-08-26 PROCEDURE — 4010F ACE/ARB THERAPY RXD/TAKEN: CPT | Mod: CPTII,HCNC,S$GLB, | Performed by: INTERNAL MEDICINE

## 2025-08-26 PROCEDURE — 1159F MED LIST DOCD IN RCRD: CPT | Mod: CPTII,HCNC,S$GLB, | Performed by: INTERNAL MEDICINE

## 2025-08-26 PROCEDURE — G0008 ADMIN INFLUENZA VIRUS VAC: HCPCS | Mod: HCNC,S$GLB,, | Performed by: INTERNAL MEDICINE

## 2025-08-26 PROCEDURE — 1160F RVW MEDS BY RX/DR IN RCRD: CPT | Mod: CPTII,HCNC,S$GLB, | Performed by: INTERNAL MEDICINE

## 2025-08-26 PROCEDURE — 99999 PR PBB SHADOW E&M-EST. PATIENT-LVL V: CPT | Mod: PBBFAC,HCNC,, | Performed by: INTERNAL MEDICINE

## 2025-08-26 RX ORDER — AMLODIPINE BESYLATE 5 MG/1
5 TABLET ORAL DAILY
Qty: 90 TABLET | Refills: 3 | Status: SHIPPED | OUTPATIENT
Start: 2025-08-26

## 2025-08-28 ENCOUNTER — INFUSION (OUTPATIENT)
Dept: INFUSION THERAPY | Facility: HOSPITAL | Age: 60
End: 2025-08-28
Payer: MEDICARE

## 2025-08-28 VITALS
RESPIRATION RATE: 16 BRPM | HEIGHT: 64 IN | BODY MASS INDEX: 45.73 KG/M2 | WEIGHT: 267.88 LBS | HEART RATE: 84 BPM | DIASTOLIC BLOOD PRESSURE: 75 MMHG | OXYGEN SATURATION: 100 % | SYSTOLIC BLOOD PRESSURE: 163 MMHG | TEMPERATURE: 98 F

## 2025-08-28 DIAGNOSIS — C90.00 MULTIPLE MYELOMA NOT HAVING ACHIEVED REMISSION: Primary | ICD-10-CM

## 2025-08-28 PROCEDURE — 63600175 PHARM REV CODE 636 W HCPCS: Mod: HCNC

## 2025-08-28 PROCEDURE — 96401 CHEMO ANTI-NEOPL SQ/IM: CPT | Mod: HCNC

## 2025-08-28 RX ORDER — EPINEPHRINE 0.3 MG/.3ML
0.3 INJECTION SUBCUTANEOUS ONCE AS NEEDED
Status: DISCONTINUED | OUTPATIENT
Start: 2025-08-28 | End: 2025-08-28 | Stop reason: HOSPADM

## 2025-08-28 RX ORDER — BORTEZOMIB 3.5 MG/1
0.7 INJECTION, POWDER, LYOPHILIZED, FOR SOLUTION INTRAVENOUS; SUBCUTANEOUS
Status: COMPLETED | OUTPATIENT
Start: 2025-08-28 | End: 2025-08-28

## 2025-08-28 RX ORDER — PROCHLORPERAZINE EDISYLATE 5 MG/ML
10 INJECTION INTRAMUSCULAR; INTRAVENOUS ONCE AS NEEDED
Status: DISCONTINUED | OUTPATIENT
Start: 2025-08-28 | End: 2025-08-28 | Stop reason: HOSPADM

## 2025-08-28 RX ORDER — DIPHENHYDRAMINE HYDROCHLORIDE 50 MG/ML
50 INJECTION, SOLUTION INTRAMUSCULAR; INTRAVENOUS ONCE AS NEEDED
Status: DISCONTINUED | OUTPATIENT
Start: 2025-08-28 | End: 2025-08-28 | Stop reason: HOSPADM

## 2025-08-28 RX ADMIN — BORTEZOMIB 1.75 MG: 3.5 INJECTION, POWDER, LYOPHILIZED, FOR SOLUTION INTRAVENOUS; SUBCUTANEOUS at 10:08

## 2025-09-01 ENCOUNTER — PATIENT MESSAGE (OUTPATIENT)
Dept: HEMATOLOGY/ONCOLOGY | Facility: CLINIC | Age: 60
End: 2025-09-01
Payer: MEDICARE

## 2025-09-04 ENCOUNTER — OFFICE VISIT (OUTPATIENT)
Dept: HEMATOLOGY/ONCOLOGY | Facility: CLINIC | Age: 60
End: 2025-09-04
Payer: MEDICARE

## 2025-09-04 VITALS
BODY MASS INDEX: 45.2 KG/M2 | TEMPERATURE: 98 F | HEIGHT: 64 IN | SYSTOLIC BLOOD PRESSURE: 162 MMHG | WEIGHT: 264.75 LBS | OXYGEN SATURATION: 100 % | HEART RATE: 88 BPM | DIASTOLIC BLOOD PRESSURE: 74 MMHG

## 2025-09-04 DIAGNOSIS — R43.2 ALTERED TASTE: ICD-10-CM

## 2025-09-04 DIAGNOSIS — Z79.899 IMMUNODEFICIENCY DUE TO DRUGS: ICD-10-CM

## 2025-09-04 DIAGNOSIS — E66.813 CLASS 3 SEVERE OBESITY DUE TO EXCESS CALORIES WITH SERIOUS COMORBIDITY AND BODY MASS INDEX (BMI) OF 45.0 TO 49.9 IN ADULT: ICD-10-CM

## 2025-09-04 DIAGNOSIS — E55.9 VITAMIN D DEFICIENCY: ICD-10-CM

## 2025-09-04 DIAGNOSIS — E87.6 HYPOKALEMIA: ICD-10-CM

## 2025-09-04 DIAGNOSIS — T45.1X5A CHEMOTHERAPY INDUCED DIARRHEA: ICD-10-CM

## 2025-09-04 DIAGNOSIS — D64.89 ANEMIA DUE TO OTHER CAUSE, NOT CLASSIFIED: ICD-10-CM

## 2025-09-04 DIAGNOSIS — R05.9 COUGH, UNSPECIFIED TYPE: ICD-10-CM

## 2025-09-04 DIAGNOSIS — G62.0 CHEMOTHERAPY-INDUCED NEUROPATHY: ICD-10-CM

## 2025-09-04 DIAGNOSIS — T45.1X5A CHEMOTHERAPY-INDUCED NEUROPATHY: ICD-10-CM

## 2025-09-04 DIAGNOSIS — C90.00 MULTIPLE MYELOMA NOT HAVING ACHIEVED REMISSION: Primary | ICD-10-CM

## 2025-09-04 DIAGNOSIS — K52.1 CHEMOTHERAPY INDUCED DIARRHEA: ICD-10-CM

## 2025-09-04 DIAGNOSIS — D84.821 IMMUNODEFICIENCY DUE TO DRUGS: ICD-10-CM

## 2025-09-04 DIAGNOSIS — D68.69 OTHER THROMBOPHILIA: ICD-10-CM

## 2025-09-04 PROCEDURE — 99999 PR PBB SHADOW E&M-EST. PATIENT-LVL V: CPT | Mod: PBBFAC,HCNC,,

## (undated) DEVICE — BANDAGE DERMACEA 3 X 4

## (undated) DEVICE — PACK UPPER EXTREMITY BAPTIST

## (undated) DEVICE — BANDAGE ELASTIC 2X5 VELCRO ST

## (undated) DEVICE — SYR B-D DISP CONTROL 10CC100/C

## (undated) DEVICE — ELECTRODE NEEDLE 1IN

## (undated) DEVICE — GAUZE SPONGE 4X4 12PLY

## (undated) DEVICE — DRESSING XEROFORM 1X8IN

## (undated) DEVICE — SPLINT FIBERGLASS PAD 4X15

## (undated) DEVICE — WIRE GUIDE WHOLEY MOD J .035

## (undated) DEVICE — PAD CAST SPECIALIST STRL 4

## (undated) DEVICE — PPM DRAPE

## (undated) DEVICE — SUT 4/0 18IN ETHILON BL P3

## (undated) DEVICE — PAD UNDERPAD 30X30

## (undated) DEVICE — SEE MEDLINE ITEM 146270

## (undated) DEVICE — PADDING CAST 4IN SPECIALIST

## (undated) DEVICE — Device

## (undated) DEVICE — PADDING CAST 4IN(OR)

## (undated) DEVICE — UNDERGLOVES BIOGEL PI SIZE 8

## (undated) DEVICE — NDL HYPO REG 25G X 1 1/2

## (undated) DEVICE — SLING ARM X-LARGE FOAM STRAP

## (undated) DEVICE — DRESSING XEROFORM FOIL PK 1X8

## (undated) DEVICE — APPLICATOR CHLORAPREP ORN 26ML

## (undated) DEVICE — WIRE WHISPER MS 014 X 190

## (undated) DEVICE — SHEATH INTRODUCER 9FR 11CM

## (undated) DEVICE — GLOVE BIOGEL SKINSENSE PI 7.5

## (undated) DEVICE — TOURNIQUET SB QC SP 18X4IN

## (undated) DEVICE — BANDAGE DERMACEA STRETCH 4X1IN

## (undated) DEVICE — CUTTER LEAD

## (undated) DEVICE — CORD BIPOLAR 12 FOOT

## (undated) DEVICE — SEE MEDLINE ITEM 146308

## (undated) DEVICE — DRAPE STERI-DRAPE 1000 17X11IN

## (undated) DEVICE — GUIDEWIRE CPS DIRECT 54CM 7FR

## (undated) DEVICE — BUCKET PLASTER DISPOSABLE

## (undated) DEVICE — KIT PROBE COVER WITH GEL

## (undated) DEVICE — ELECTRODE REM PLYHSV RETURN 9

## (undated) DEVICE — ADHESIVE DERMABOND ADVANCED

## (undated) DEVICE — DRAPE INCISE IOBAN 2 23X17IN

## (undated) DEVICE — INTRODUCER CATH 4F 11CM

## (undated) DEVICE — CATH WEDGE 6FR X 110CM

## (undated) DEVICE — GUIDEWIRE STD .035X180CM ANG

## (undated) DEVICE — PAD DEFIB CADENCE ADULT R2

## (undated) DEVICE — SOL 9P NACL IRR PIC IL

## (undated) DEVICE — CHLORAPREP W TINT 26ML APPL

## (undated) DEVICE — GUIDEWIRD CPS COURIER FIRM

## (undated) DEVICE — DRUG BACITRACIN UNGT OINTMENT

## (undated) DEVICE — FORCEP BIPOLAR ADSON 1MM TIP

## (undated) DEVICE — SHEATH SAFE ULTRA 9FR

## (undated) DEVICE — TOURNIQUET SB QC DP 18X4IN

## (undated) DEVICE — BLADE PLASMA WIDE SPATULA TIP

## (undated) DEVICE — BANDAGE CONFORM 3IN STRL

## (undated) DEVICE — SHEATH TIGHTRAL SUB-C 9FR

## (undated) DEVICE — PAD CAST SPECIALIST STRL 6

## (undated) DEVICE — GUIDEWIRE CPS COURIER MED

## (undated) DEVICE — BANDAGE COBAN COLOR ASSORT 3X5

## (undated) DEVICE — NDL PERCUTANEOUS ENTRYBSDN 18

## (undated) DEVICE — SAFESHEATH II LONG 6FR 23CM

## (undated) DEVICE — DRESSING N ADH OIL EMUL 3X3

## (undated) DEVICE — PACK PACER PERMANENT

## (undated) DEVICE — INTRODUCER HEMOSTASIS 7.5F